# Patient Record
Sex: FEMALE | Race: WHITE | NOT HISPANIC OR LATINO | Employment: UNEMPLOYED | URBAN - METROPOLITAN AREA
[De-identification: names, ages, dates, MRNs, and addresses within clinical notes are randomized per-mention and may not be internally consistent; named-entity substitution may affect disease eponyms.]

---

## 2017-01-02 ENCOUNTER — HOSPITAL ENCOUNTER (INPATIENT)
Facility: HOSPITAL | Age: 72
LOS: 3 days | Discharge: HOME | DRG: 189 | End: 2017-01-06
Admitting: INTERNAL MEDICINE
Payer: MEDICARE

## 2017-01-02 DIAGNOSIS — I48.91 ATRIAL FIBRILLATION WITH RVR: Primary | ICD-10-CM

## 2017-01-02 DIAGNOSIS — I48.0 PAROXYSMAL ATRIAL FIBRILLATION: ICD-10-CM

## 2017-01-02 DIAGNOSIS — J45.909 ASTHMA: ICD-10-CM

## 2017-01-02 DIAGNOSIS — I10 ESSENTIAL HYPERTENSION: ICD-10-CM

## 2017-01-02 DIAGNOSIS — R06.02 SOB (SHORTNESS OF BREATH): ICD-10-CM

## 2017-01-02 DIAGNOSIS — E78.5 HYPERLIPIDEMIA, UNSPECIFIED HYPERLIPIDEMIA TYPE: ICD-10-CM

## 2017-01-02 DIAGNOSIS — R07.9 CHEST PAIN, UNSPECIFIED TYPE: ICD-10-CM

## 2017-01-02 DIAGNOSIS — K55.1 CHRONIC MESENTERIC ISCHEMIA: ICD-10-CM

## 2017-01-02 DIAGNOSIS — J44.1 COPD EXACERBATION: ICD-10-CM

## 2017-01-02 LAB
ALBUMIN SERPL BCP-MCNC: 3.6 G/DL
ALLENS TEST: ABNORMAL
ALP SERPL-CCNC: 43 U/L
ALT SERPL W/O P-5'-P-CCNC: 17 U/L
ANION GAP SERPL CALC-SCNC: 9 MMOL/L
AST SERPL-CCNC: 20 U/L
BASOPHILS # BLD AUTO: 0.02 K/UL
BASOPHILS NFR BLD: 0.2 %
BILIRUB SERPL-MCNC: 0.4 MG/DL
BILIRUB UR QL STRIP: NEGATIVE
BNP SERPL-MCNC: 245 PG/ML
BUN SERPL-MCNC: 12 MG/DL
CALCIUM SERPL-MCNC: 9.1 MG/DL
CHLORIDE SERPL-SCNC: 103 MMOL/L
CLARITY UR: CLEAR
CO2 SERPL-SCNC: 26 MMOL/L
COLOR UR: YELLOW
CREAT SERPL-MCNC: 0.7 MG/DL
DELSYS: ABNORMAL
DIFFERENTIAL METHOD: ABNORMAL
EOSINOPHIL # BLD AUTO: 0 K/UL
EOSINOPHIL NFR BLD: 0.1 %
ERYTHROCYTE [DISTWIDTH] IN BLOOD BY AUTOMATED COUNT: 14 %
EST. GFR  (AFRICAN AMERICAN): >60 ML/MIN/1.73 M^2
EST. GFR  (NON AFRICAN AMERICAN): >60 ML/MIN/1.73 M^2
FLUAV AG SPEC QL IA: NEGATIVE
FLUBV AG SPEC QL IA: NEGATIVE
GLUCOSE SERPL-MCNC: 99 MG/DL
GLUCOSE UR QL STRIP: NEGATIVE
HCO3 UR-SCNC: 22.3 MMOL/L (ref 24–28)
HCT VFR BLD AUTO: 39.4 %
HCT VFR BLD CALC: 40 %PCV (ref 36–54)
HGB BLD-MCNC: 13 G/DL
HGB BLD-MCNC: 14 G/DL
HGB UR QL STRIP: NEGATIVE
INR PPP: 1.9
KETONES UR QL STRIP: NEGATIVE
LACTATE SERPL-SCNC: 0.9 MMOL/L
LEUKOCYTE ESTERASE UR QL STRIP: NEGATIVE
LYMPHOCYTES # BLD AUTO: 2 K/UL
LYMPHOCYTES NFR BLD: 22 %
MAGNESIUM SERPL-MCNC: 1.4 MG/DL
MCH RBC QN AUTO: 31.6 PG
MCHC RBC AUTO-ENTMCNC: 33 %
MCV RBC AUTO: 96 FL
MONOCYTES # BLD AUTO: 1 K/UL
MONOCYTES NFR BLD: 11.1 %
NEUTROPHILS # BLD AUTO: 6 K/UL
NEUTROPHILS NFR BLD: 66.4 %
NITRITE UR QL STRIP: NEGATIVE
PCO2 BLDA: 38.1 MMHG (ref 35–45)
PH SMN: 7.38 [PH] (ref 7.35–7.45)
PH UR STRIP: 7 [PH] (ref 5–8)
PHOSPHATE SERPL-MCNC: 3.1 MG/DL
PLATELET # BLD AUTO: 228 K/UL
PMV BLD AUTO: 9.9 FL
PO2 BLDA: 99 MMHG (ref 80–100)
POC BE: -3 MMOL/L
POC IONIZED CALCIUM: 1.1 MMOL/L (ref 1.06–1.42)
POC SATURATED O2: 98 % (ref 95–100)
POC TCO2: 23 MMOL/L (ref 23–27)
POCT GLUCOSE: 195 MG/DL (ref 70–110)
POCT GLUCOSE: 240 MG/DL (ref 70–110)
POTASSIUM BLD-SCNC: 3.8 MMOL/L (ref 3.5–5.1)
POTASSIUM SERPL-SCNC: 3.9 MMOL/L
PROCALCITONIN SERPL IA-MCNC: <0.09 NG/ML
PROT SERPL-MCNC: 6.7 G/DL
PROT UR QL STRIP: ABNORMAL
PROTHROMBIN TIME: 19.7 SEC
RBC # BLD AUTO: 4.11 M/UL
SAMPLE: ABNORMAL
SITE: ABNORMAL
SODIUM BLD-SCNC: 137 MMOL/L (ref 136–145)
SODIUM SERPL-SCNC: 138 MMOL/L
SP GR UR STRIP: <=1.005 (ref 1–1.03)
SPECIMEN SOURCE: NORMAL
TROPONIN I SERPL DL<=0.01 NG/ML-MCNC: 0.01 NG/ML
URN SPEC COLLECT METH UR: ABNORMAL
UROBILINOGEN UR STRIP-ACNC: NEGATIVE EU/DL
WBC # BLD AUTO: 9 K/UL

## 2017-01-02 PROCEDURE — 96372 THER/PROPH/DIAG INJ SC/IM: CPT

## 2017-01-02 PROCEDURE — 25000003 PHARM REV CODE 250: Performed by: HOSPITALIST

## 2017-01-02 PROCEDURE — 80053 COMPREHEN METABOLIC PANEL: CPT

## 2017-01-02 PROCEDURE — 83880 ASSAY OF NATRIURETIC PEPTIDE: CPT

## 2017-01-02 PROCEDURE — 94644 CONT INHLJ TX 1ST HOUR: CPT

## 2017-01-02 PROCEDURE — 81003 URINALYSIS AUTO W/O SCOPE: CPT

## 2017-01-02 PROCEDURE — 83735 ASSAY OF MAGNESIUM: CPT

## 2017-01-02 PROCEDURE — 99285 EMERGENCY DEPT VISIT HI MDM: CPT

## 2017-01-02 PROCEDURE — 96368 THER/DIAG CONCURRENT INF: CPT

## 2017-01-02 PROCEDURE — 82962 GLUCOSE BLOOD TEST: CPT

## 2017-01-02 PROCEDURE — 87086 URINE CULTURE/COLONY COUNT: CPT

## 2017-01-02 PROCEDURE — 85610 PROTHROMBIN TIME: CPT

## 2017-01-02 PROCEDURE — 93005 ELECTROCARDIOGRAM TRACING: CPT

## 2017-01-02 PROCEDURE — 85025 COMPLETE CBC W/AUTO DIFF WBC: CPT

## 2017-01-02 PROCEDURE — 84484 ASSAY OF TROPONIN QUANT: CPT

## 2017-01-02 PROCEDURE — 25000003 PHARM REV CODE 250

## 2017-01-02 PROCEDURE — 63600175 PHARM REV CODE 636 W HCPCS

## 2017-01-02 PROCEDURE — 96375 TX/PRO/DX INJ NEW DRUG ADDON: CPT

## 2017-01-02 PROCEDURE — 96376 TX/PRO/DX INJ SAME DRUG ADON: CPT

## 2017-01-02 PROCEDURE — 84145 PROCALCITONIN (PCT): CPT

## 2017-01-02 PROCEDURE — 84100 ASSAY OF PHOSPHORUS: CPT

## 2017-01-02 PROCEDURE — 83605 ASSAY OF LACTIC ACID: CPT

## 2017-01-02 PROCEDURE — 94761 N-INVAS EAR/PLS OXIMETRY MLT: CPT

## 2017-01-02 PROCEDURE — G0378 HOSPITAL OBSERVATION PER HR: HCPCS

## 2017-01-02 PROCEDURE — 63600175 PHARM REV CODE 636 W HCPCS: Performed by: HOSPITALIST

## 2017-01-02 PROCEDURE — 36600 WITHDRAWAL OF ARTERIAL BLOOD: CPT

## 2017-01-02 PROCEDURE — 25000242 PHARM REV CODE 250 ALT 637 W/ HCPCS

## 2017-01-02 PROCEDURE — 82803 BLOOD GASES ANY COMBINATION: CPT

## 2017-01-02 PROCEDURE — 27000221 HC OXYGEN, UP TO 24 HOURS

## 2017-01-02 PROCEDURE — 96366 THER/PROPH/DIAG IV INF ADDON: CPT

## 2017-01-02 PROCEDURE — 96365 THER/PROPH/DIAG IV INF INIT: CPT

## 2017-01-02 PROCEDURE — 94640 AIRWAY INHALATION TREATMENT: CPT

## 2017-01-02 PROCEDURE — 87040 BLOOD CULTURE FOR BACTERIA: CPT | Mod: 59

## 2017-01-02 PROCEDURE — 87400 INFLUENZA A/B EACH AG IA: CPT

## 2017-01-02 PROCEDURE — 25000242 PHARM REV CODE 250 ALT 637 W/ HCPCS: Performed by: HOSPITALIST

## 2017-01-02 PROCEDURE — 96367 TX/PROPH/DG ADDL SEQ IV INF: CPT

## 2017-01-02 RX ORDER — IPRATROPIUM BROMIDE AND ALBUTEROL SULFATE 2.5; .5 MG/3ML; MG/3ML
3 SOLUTION RESPIRATORY (INHALATION) EVERY 4 HOURS PRN
Status: DISCONTINUED | OUTPATIENT
Start: 2017-01-02 | End: 2017-01-06 | Stop reason: HOSPADM

## 2017-01-02 RX ORDER — ALBUTEROL SULFATE 2.5 MG/.5ML
2.5 SOLUTION RESPIRATORY (INHALATION) ONCE
Status: COMPLETED | OUTPATIENT
Start: 2017-01-02 | End: 2017-01-02

## 2017-01-02 RX ORDER — HYDROCODONE BITARTRATE AND ACETAMINOPHEN 5; 325 MG/1; MG/1
1 TABLET ORAL
Status: COMPLETED | OUTPATIENT
Start: 2017-01-02 | End: 2017-01-02

## 2017-01-02 RX ORDER — ALBUTEROL SULFATE 2.5 MG/.5ML
SOLUTION RESPIRATORY (INHALATION)
Status: DISPENSED
Start: 2017-01-02 | End: 2017-01-03

## 2017-01-02 RX ORDER — DIPHENHYDRAMINE HYDROCHLORIDE 50 MG/ML
50 INJECTION INTRAMUSCULAR; INTRAVENOUS
Status: COMPLETED | OUTPATIENT
Start: 2017-01-02 | End: 2017-01-02

## 2017-01-02 RX ORDER — GLUCAGON 1 MG
1 KIT INJECTION
Status: DISCONTINUED | OUTPATIENT
Start: 2017-01-02 | End: 2017-01-06 | Stop reason: HOSPADM

## 2017-01-02 RX ORDER — IBUPROFEN 200 MG
24 TABLET ORAL
Status: DISCONTINUED | OUTPATIENT
Start: 2017-01-02 | End: 2017-01-06 | Stop reason: HOSPADM

## 2017-01-02 RX ORDER — CLOPIDOGREL BISULFATE 75 MG/1
75 TABLET ORAL DAILY
Status: DISCONTINUED | OUTPATIENT
Start: 2017-01-03 | End: 2017-01-06 | Stop reason: HOSPADM

## 2017-01-02 RX ORDER — IPRATROPIUM BROMIDE AND ALBUTEROL SULFATE 2.5; .5 MG/3ML; MG/3ML
3 SOLUTION RESPIRATORY (INHALATION)
Status: DISCONTINUED | OUTPATIENT
Start: 2017-01-02 | End: 2017-01-06 | Stop reason: HOSPADM

## 2017-01-02 RX ORDER — MEROPENEM AND SODIUM CHLORIDE 1 G/50ML
1 INJECTION, SOLUTION INTRAVENOUS
Status: DISCONTINUED | OUTPATIENT
Start: 2017-01-02 | End: 2017-01-02

## 2017-01-02 RX ORDER — OXYBUTYNIN CHLORIDE 5 MG/1
5 TABLET, EXTENDED RELEASE ORAL DAILY
Status: DISCONTINUED | OUTPATIENT
Start: 2017-01-03 | End: 2017-01-06 | Stop reason: HOSPADM

## 2017-01-02 RX ORDER — FLUTICASONE PROPIONATE 50 MCG
1 SPRAY, SUSPENSION (ML) NASAL DAILY
Status: DISCONTINUED | OUTPATIENT
Start: 2017-01-03 | End: 2017-01-06 | Stop reason: HOSPADM

## 2017-01-02 RX ORDER — ALBUTEROL SULFATE 2.5 MG/.5ML
10 SOLUTION RESPIRATORY (INHALATION) CONTINUOUS
Status: DISCONTINUED | OUTPATIENT
Start: 2017-01-02 | End: 2017-01-02

## 2017-01-02 RX ORDER — PANTOPRAZOLE SODIUM 40 MG/1
40 TABLET, DELAYED RELEASE ORAL DAILY
Status: DISCONTINUED | OUTPATIENT
Start: 2017-01-03 | End: 2017-01-06 | Stop reason: HOSPADM

## 2017-01-02 RX ORDER — ONDANSETRON 2 MG/ML
8 INJECTION INTRAMUSCULAR; INTRAVENOUS
Status: COMPLETED | OUTPATIENT
Start: 2017-01-02 | End: 2017-01-02

## 2017-01-02 RX ORDER — MONTELUKAST SODIUM 10 MG/1
10 TABLET ORAL NIGHTLY
Status: DISCONTINUED | OUTPATIENT
Start: 2017-01-02 | End: 2017-01-06 | Stop reason: HOSPADM

## 2017-01-02 RX ORDER — LISINOPRIL 20 MG/1
20 TABLET ORAL DAILY
Status: DISCONTINUED | OUTPATIENT
Start: 2017-01-03 | End: 2017-01-06 | Stop reason: HOSPADM

## 2017-01-02 RX ORDER — INSULIN ASPART 100 [IU]/ML
0-5 INJECTION, SOLUTION INTRAVENOUS; SUBCUTANEOUS
Status: DISCONTINUED | OUTPATIENT
Start: 2017-01-02 | End: 2017-01-06 | Stop reason: HOSPADM

## 2017-01-02 RX ORDER — DILTIAZEM HYDROCHLORIDE 30 MG/1
30 TABLET, FILM COATED ORAL
Status: COMPLETED | OUTPATIENT
Start: 2017-01-02 | End: 2017-01-02

## 2017-01-02 RX ORDER — PNV NO.95/FERROUS FUM/FOLIC AC 28MG-0.8MG
100 TABLET ORAL DAILY
Status: DISCONTINUED | OUTPATIENT
Start: 2017-01-03 | End: 2017-01-06 | Stop reason: HOSPADM

## 2017-01-02 RX ORDER — IBUPROFEN 200 MG
16 TABLET ORAL
Status: DISCONTINUED | OUTPATIENT
Start: 2017-01-02 | End: 2017-01-06 | Stop reason: HOSPADM

## 2017-01-02 RX ORDER — DILTIAZEM HYDROCHLORIDE 180 MG/1
180 CAPSULE, COATED, EXTENDED RELEASE ORAL DAILY
Status: DISCONTINUED | OUTPATIENT
Start: 2017-01-03 | End: 2017-01-03

## 2017-01-02 RX ORDER — METHYLPREDNISOLONE SOD SUCC 125 MG
125 VIAL (EA) INJECTION
Status: COMPLETED | OUTPATIENT
Start: 2017-01-02 | End: 2017-01-02

## 2017-01-02 RX ORDER — MAGNESIUM SULFATE HEPTAHYDRATE 40 MG/ML
2 INJECTION, SOLUTION INTRAVENOUS ONCE
Status: COMPLETED | OUTPATIENT
Start: 2017-01-02 | End: 2017-01-02

## 2017-01-02 RX ORDER — DILTIAZEM HYDROCHLORIDE 5 MG/ML
20 INJECTION INTRAVENOUS
Status: COMPLETED | OUTPATIENT
Start: 2017-01-02 | End: 2017-01-02

## 2017-01-02 RX ORDER — AZELASTINE 1 MG/ML
1 SPRAY, METERED NASAL 2 TIMES DAILY
Status: DISCONTINUED | OUTPATIENT
Start: 2017-01-02 | End: 2017-01-06 | Stop reason: HOSPADM

## 2017-01-02 RX ORDER — FLUTICASONE FUROATE AND VILANTEROL 100; 25 UG/1; UG/1
1 POWDER RESPIRATORY (INHALATION) DAILY
Status: DISCONTINUED | OUTPATIENT
Start: 2017-01-03 | End: 2017-01-06 | Stop reason: HOSPADM

## 2017-01-02 RX ORDER — DILTIAZEM HYDROCHLORIDE 5 MG/ML
15 INJECTION INTRAVENOUS
Status: COMPLETED | OUTPATIENT
Start: 2017-01-02 | End: 2017-01-02

## 2017-01-02 RX ORDER — FERROUS SULFATE 325(65) MG
325 TABLET, DELAYED RELEASE (ENTERIC COATED) ORAL
Status: DISCONTINUED | OUTPATIENT
Start: 2017-01-03 | End: 2017-01-06 | Stop reason: HOSPADM

## 2017-01-02 RX ORDER — ONDANSETRON 8 MG/1
8 TABLET, ORALLY DISINTEGRATING ORAL EVERY 8 HOURS PRN
Status: DISCONTINUED | OUTPATIENT
Start: 2017-01-02 | End: 2017-01-06 | Stop reason: HOSPADM

## 2017-01-02 RX ORDER — WARFARIN SODIUM 5 MG/1
5 TABLET ORAL
Status: DISCONTINUED | OUTPATIENT
Start: 2017-01-03 | End: 2017-01-05

## 2017-01-02 RX ORDER — MEROPENEM AND SODIUM CHLORIDE 1 G/50ML
1 INJECTION, SOLUTION INTRAVENOUS
Status: COMPLETED | OUTPATIENT
Start: 2017-01-02 | End: 2017-01-02

## 2017-01-02 RX ORDER — IPRATROPIUM BROMIDE 0.5 MG/2.5ML
SOLUTION RESPIRATORY (INHALATION)
Status: COMPLETED
Start: 2017-01-02 | End: 2017-01-02

## 2017-01-02 RX ORDER — SODIUM CHLORIDE 9 MG/ML
INJECTION, SOLUTION INTRAVENOUS CONTINUOUS
Status: ACTIVE | OUTPATIENT
Start: 2017-01-02 | End: 2017-01-03

## 2017-01-02 RX ORDER — IPRATROPIUM BROMIDE 0.5 MG/2.5ML
0.5 SOLUTION RESPIRATORY (INHALATION) ONCE
Status: COMPLETED | OUTPATIENT
Start: 2017-01-02 | End: 2017-01-02

## 2017-01-02 RX ADMIN — ALBUTEROL SULFATE 2.5 MG: 2.5 SOLUTION RESPIRATORY (INHALATION) at 05:01

## 2017-01-02 RX ADMIN — METHYLPREDNISOLONE SODIUM SUCCINATE 125 MG: 125 INJECTION, POWDER, FOR SOLUTION INTRAMUSCULAR; INTRAVENOUS at 01:01

## 2017-01-02 RX ADMIN — MONTELUKAST SODIUM 10 MG: 10 TABLET, FILM COATED ORAL at 10:01

## 2017-01-02 RX ADMIN — SODIUM CHLORIDE: 0.9 INJECTION, SOLUTION INTRAVENOUS at 07:01

## 2017-01-02 RX ADMIN — HYDROCODONE BITARTRATE AND ACETAMINOPHEN 1 TABLET: 5; 325 TABLET ORAL at 01:01

## 2017-01-02 RX ADMIN — ONDANSETRON 8 MG: 2 INJECTION INTRAMUSCULAR; INTRAVENOUS at 02:01

## 2017-01-02 RX ADMIN — MEROPENEM AND SODIUM CHLORIDE 1 G: 1 INJECTION, SOLUTION INTRAVENOUS at 01:01

## 2017-01-02 RX ADMIN — VANCOMYCIN HYDROCHLORIDE 2000 MG: 1 INJECTION, POWDER, LYOPHILIZED, FOR SOLUTION INTRAVENOUS at 02:01

## 2017-01-02 RX ADMIN — AZELASTINE HYDROCHLORIDE 137 MCG: 137 SPRAY, METERED NASAL at 11:01

## 2017-01-02 RX ADMIN — INSULIN ASPART 1 UNITS: 100 INJECTION, SOLUTION INTRAVENOUS; SUBCUTANEOUS at 08:01

## 2017-01-02 RX ADMIN — DILTIAZEM HYDROCHLORIDE 20 MG: 5 INJECTION INTRAVENOUS at 02:01

## 2017-01-02 RX ADMIN — DILTIAZEM HYDROCHLORIDE 30 MG: 30 TABLET, FILM COATED ORAL at 03:01

## 2017-01-02 RX ADMIN — IPRATROPIUM BROMIDE AND ALBUTEROL SULFATE 3 ML: .5; 3 SOLUTION RESPIRATORY (INHALATION) at 07:01

## 2017-01-02 RX ADMIN — MAGNESIUM SULFATE IN WATER 2 G: 40 INJECTION, SOLUTION INTRAVENOUS at 07:01

## 2017-01-02 RX ADMIN — IPRATROPIUM BROMIDE 0.5 MG: 0.5 SOLUTION RESPIRATORY (INHALATION) at 05:01

## 2017-01-02 RX ADMIN — DIPHENHYDRAMINE HYDROCHLORIDE 50 MG: 50 INJECTION, SOLUTION INTRAMUSCULAR; INTRAVENOUS at 02:01

## 2017-01-02 RX ADMIN — SODIUM CHLORIDE 1000 ML: 0.9 INJECTION, SOLUTION INTRAVENOUS at 01:01

## 2017-01-02 RX ADMIN — ALBUTEROL SULFATE 10 MG: 2.5 SOLUTION RESPIRATORY (INHALATION) at 01:01

## 2017-01-02 RX ADMIN — DILTIAZEM HYDROCHLORIDE 15 MG: 5 INJECTION INTRAVENOUS at 01:01

## 2017-01-02 RX ADMIN — Medication 1 G: at 11:01

## 2017-01-02 NOTE — H&P
[facility] History and Physical - Resident Note    Admitting Team: [facility] Team A  Attending Physician: Dr. Calvillo  Resident: Sirena Keith and Teri Greene    Date of Admit: 1/2/2017    Chief Complaint     Shortness of Breath (was sent to ER by [facility].  Was diagnosed with pneumonia on Friday.  Patient complaints od increase shortness of breath and tachycardic.)   for 1.5 weeks    Subjective:      History of Present Illness:  Adriana Strong is a 72 y.o.  female who  has a past medical history of Anticoagulant long-term use; Anxiety; Arthritis; Asthma; Cataracts, bilateral; Chest pain, atypical; Colon polyp; Coronary artery disease; Diabetes mellitus; Dry eyes; Esophageal erosions; GERD (gastroesophageal reflux disease); Heart murmur; Hemorrhoid; High cholesterol; Hypertension; Irritable bowel syndrome; Shingles (2015); TIA (transient ischemic attack); and Use of cane as ambulatory aid.. The patient presented to [facility] on 1/2/2017 with a primary complaint of Shortness of Breath (was sent to ER by [facility].  Was diagnosed with pneumonia on Friday.  Patient complaints od increase shortness of breath and tachycardic.)      The patient was in their usual state of health until 1.5 weeks ago when she began to have post-nasal drip and congestion with a non-productive cough. 3 days ago she went to her PCP Dr. Mcgraw's office and was given azithromycin for pneumonia seen on CXR. Reports chills but no fever. Has been taking the azithromycin without improvement. States she went to urgent care today because she was coughing so much she could not catch her breath this AM. Was sent from urgent care to ED. Found to be in a-fib with RVR. Reports her  recently had a sinus infection. Has not been eating or drinking much. States she took her medications this AM.    ED Interventions: 1 L NS, 1 g meropenem IV, 2 g Vanc IV, Percocet 5 mg, Diltiazem 15 mg IV,  Diltiazem 20 mg IV, Diltiazem 30 mg PO, continuous albuterol neb, benadryl 50 mg    Past Medical History:  Past Medical History   Diagnosis Date    Anticoagulant long-term use      on Plavix since May 2015    Anxiety     Arthritis     Asthma     Cataracts, bilateral     Chest pain, atypical     Colon polyp     Coronary artery disease     Diabetes mellitus     Dry eyes     Esophageal erosions     GERD (gastroesophageal reflux disease)     Heart murmur     Hemorrhoid     High cholesterol     Hypertension     Irritable bowel syndrome     Shingles 2015    TIA (transient ischemic attack)     Use of cane as ambulatory aid        Past Surgical History:  Past Surgical History   Procedure Laterality Date    Total knee arthroplasty Bilateral     Nose polyp      Breast surgery       left--- a lump--- no cancer    Tonsillectomy      Left nasal polyp      Hysterectomy  partial     1982 partial hysterectomy    Total abdominal hysterectomy       2014    Left neck lymph node      Right hip fatty mass tissue      Upper gastrointestinal endoscopy  2014    Colonoscopy  2014    Abdominal surgery      Stent to small intestine      Angiocele       2007, 2014    Superior vena cava angioplasty / stenting         Allergies:  Review of patient's allergies indicates:   Allergen Reactions    Celebrex [celecoxib] Shortness Of Breath    Dicyclomine Hives    Adhesive Dermatitis    Avelox [moxifloxacin] Swelling    Cilostazol Other (See Comments)     Elevates blood pressure    Eryc [erythromycin] Other (See Comments)     unknown    Iodine and iodide containing products Hives    Keflex [cephalexin] Hives    Meclomen      rashes    Meloxicam      Ear ringing    Metoclopramide Other (See Comments)     High blood pressure    Sulfa (sulfonamide antibiotics) Itching       Home Medications:  Prior to Admission medications    Medication Sig Start Date End Date Taking? Authorizing Provider   acetaminophen  (TYLENOL ARTHRITIS) 650 MG TbSR Take 1,300 mg by mouth 2 (two) times daily.    Historical Provider, MD   ADVAIR DISKUS 250-50 mcg/dose diskus inhaler Inhale 1 puff into the lungs 2 (two) times daily.  2/12/15   Historical Provider, MD   albuterol (ACCUNEB) 1.25 mg/3 mL Nebu Take 3 mLs (1.25 mg total) by nebulization every 6 (six) hours as needed (x 4 days while awake). 7/2/16 7/2/17  George Yates MD   albuterol sulfate 2.5 mg/0.5 mL Nebu Take 2.5 mg by nebulization.    Historical Provider, MD   ascorbic acid (VITAMIN C) 500 MG tablet Take 500 mg by mouth once daily.    Historical Provider, MD   azelastine (ASTELIN) 137 mcg nasal spray 1 spray by Nasal route 2 (two) times daily.    Historical Provider, MD   calcium carbonate (OS-MICHAEL) 600 mg (1,500 mg) Tab Take 1,200 mg by mouth once daily.    Historical Provider, MD   clopidogrel (PLAVIX) 75 mg tablet Take 1 tablet (75 mg total) by mouth once daily. 12/17/15   Joana Hernandez MD   cyanocobalamin (VITAMIN B-12) 1000 MCG tablet Take 100 mcg by mouth once daily.    Historical Provider, MD   diltiaZEM (DILACOR XR) 180 MG CDCR Take 1 capsule (180 mg total) by mouth once daily. 11/11/16 11/11/17  Jr Carrington MD   ferrous sulfate 325 mg (65 mg iron) Tab tablet Take 325 mg by mouth daily with breakfast.    Historical Provider, MD   fish oil-omega-3 fatty acids 300-1,000 mg capsule Take 1 g by mouth once daily.     Historical Provider, MD   fluticasone (FLONASE) 50 mcg/actuation nasal spray 1 spray by Each Nare route once daily.    Historical Provider, MD   FLUZONE HIGH-DOSE 2016-17, PF, 180 mcg/0.5 mL Syrg TO BE ADMINISTERED BY PHARMACIST FOR IMMUNIZATION 8/23/16   Historical Provider, MD   lisinopril (PRINIVIL,ZESTRIL) 20 MG tablet Take 1 tablet (20 mg total) by mouth once daily. 12/9/16 12/9/17  Jr Carrington MD   lorazepam (ATIVAN) 0.5 MG tablet Take 0.5 mg by mouth once daily.    Historical Provider, MD   magnesium 250 mg Tab Take 750 mg by  "mouth as needed.     Historical Provider, MD   montelukast (SINGULAIR) 10 mg tablet Take 10 mg by mouth every evening.    Historical Provider, MD   oxybutynin (DITROPAN-XL) 5 MG TR24  10/19/15   Historical Provider, MD   pantoprazole (PROTONIX) 40 MG tablet Take 1 tablet (40 mg total) by mouth once daily. 12/2/15   Efren Kemp MD   polyethylene glycol (GLYCOLAX) 17 gram PwPk Take 17 g by mouth once daily.    Historical Provider, MD   POLYSORBATE 80/GLYCERIN (REFRESH DRY EYE THERAPY OPHT) Apply to eye 2 (two) times daily.    Historical Provider, MD   predniSONE (DELTASONE) 20 MG tablet TAKE 2 TABLETS BY MOUTH ON DAYS 1-3 THEN TAKE 1 TABLET ON DAYS 4-5 THEN 1/2 TAB ON DAYS 6-7 16   Historical Provider, MD   PROAIR HFA 90 mcg/actuation inhaler  11/10/15   Historical Provider, MD   psyllium (METAMUCIL) powder Take 1 packet by mouth 3 (three) times daily.    Historical Provider, MD   warfarin (COUMADIN) 5 MG tablet Take 1 1/2 tablet  and 1 tablet  Sat Sun 16   Jr Carrington MD       Family History:  Family History   Problem Relation Age of Onset    Colon cancer Neg Hx     Inflammatory bowel disease Neg Hx        Social History:  Social History   Substance Use Topics    Smoking status: Never Smoker    Smokeless tobacco: None    Alcohol use No       Review of Systems:  Pertinent items are noted in HPI. All other systems are reviewed and are negative.    Health Maintaince :   Primary Care Physician: Mariluz  Immunizations:   TDap is up to date.  Influenza is up to date.  Pneumovax is up to date.     Objective:   Last 24 Hour Vital Signs:  BP  Min: 120/61  Max: 146/72  Temp  Av.3 °F (37.4 °C)  Min: 98.2 °F (36.8 °C)  Max: 99.8 °F (37.7 °C)  Pulse  Av.1  Min: 60  Max: 131  Resp  Av.4  Min: 17  Max: 26  SpO2  Av.1 %  Min: 94 %  Max: 100 %  Height  Av' 4" (162.6 cm)  Min: 5' 4" (162.6 cm)  Max: 5' 4" (162.6 cm)  Weight  Av.7 kg (222 lb)  Min: 100.7 kg (222 " lb)  Max: 100.7 kg (222 lb)  Body mass index is 38.11 kg/(m^2).       Physical Examination:  Gen: awake, alert, on nasal canula  Head: NCAT   Eyes: PERRL EOMI  CV: Tachycardic, irregularly irregular, no murmur appreciated  Pulm: Tachypneic, Diffuse wheezes and rhonchi.  ABD: Soft, nontender, nondistended  Ext: no c/c/e  Neuro: non-focal    Laboratory:  Most Recent Data:  CBC: Lab Results   Component Value Date    WBC 9.00 01/02/2017    HGB 13.0 01/02/2017    HCT 39.4 01/02/2017     01/02/2017    MCV 96 01/02/2017    RDW 14.0 01/02/2017     WBC Differential: 66.4 % N, 0 % Bands, 22 % L, 11.1 % M, 0.1 % Eo, 0.2 % Baso, 0 additional cells seen  BMP: Lab Results   Component Value Date     01/02/2017    K 3.9 01/02/2017     01/02/2017    CO2 26 01/02/2017    BUN 12 01/02/2017    CREATININE 0.7 01/02/2017    GLU 99 01/02/2017    CALCIUM 9.1 01/02/2017    MG 1.4 (L) 01/02/2017    PHOS 3.1 01/02/2017     LFTs: Lab Results   Component Value Date    PROT 6.7 01/02/2017    ALBUMIN 3.6 01/02/2017    BILITOT 0.4 01/02/2017    AST 20 01/02/2017    ALKPHOS 43 (L) 01/02/2017    ALT 17 01/02/2017     Coags:   Lab Results   Component Value Date    INR 1.9 (H) 01/02/2017     FLP: Lab Results   Component Value Date    CHOL 220 (H) 09/28/2016    HDL 75 09/28/2016    LDLCALC 124 09/28/2016    TRIG 104 09/28/2016    CHOLHDL 2.9 09/28/2016     DM: Lab Results   Component Value Date    HGBA1C 5.9 01/21/2016    HGBA1C 5.7 11/08/2015    HGBA1C 5.9 05/08/2015    LDLCALC 124 09/28/2016    CREATININE 0.7 01/02/2017     Thyroid: Lab Results   Component Value Date    TSH 1.62 09/28/2016    FREET4 1.19 07/08/2016     Anemia: Lab Results   Component Value Date    IRON 42 (L) 08/24/2016    TIBC 462 (H) 08/24/2016    FERRITIN 16 (L) 07/01/2016    ZUADYKIC05 258 08/24/2016    FOLATE >24.0 08/24/2016     Cardiac: Lab Results   Component Value Date    TROPONINI 0.013 01/02/2017     (H) 01/02/2017     Urinalysis: Lab Results    Component Value Date    LABURIN  05/07/2011     60,000 ORGANISMS/ML   ESCHERICHIA COLI  Amikacin             <=16       SENSITIVE  Amox/K Clav          16/8       INTERMEDIATE  Ceftriaxone          <=8        SENSITIVE  Ceftazidime          <=1        SENSITIVE  Cefazolin            16         INTERMEDIATE  Ciprofloxacin        <=1        SENSITIVE  Nitrofurantoin       <=32       SENSITIVE  Gentamicin           <=4        SENSITIVE  Meropenem            <=4        SENSITIVE  Bactrim              <=2/38     SENSITIVE  Tetracycline         <=4        SENSITIVE  Timentin             >64        RESISTANT  Tobramycin           <=4        SENSITIVE    LABURIN NO OTHER SIGNIFICANT ISOLATE. 05/07/2011    COLORU Yellow 01/02/2017    SPECGRAV <=1.005 (A) 01/02/2017    NITRITE Negative 01/02/2017    KETONESU Negative 01/02/2017    UROBILINOGEN Negative 01/02/2017    WBCUA 3 01/20/2016       Trended Lab Data:    Recent Labs  Lab 01/02/17  1239   WBC 9.00   HGB 13.0   HCT 39.4      MCV 96   RDW 14.0      K 3.9      CO2 26   BUN 12   CREATININE 0.7   GLU 99   PROT 6.7   ALBUMIN 3.6   BILITOT 0.4   AST 20   ALKPHOS 43*   ALT 17       Trended Cardiac Data:    Recent Labs  Lab 01/02/17  1239   TROPONINI 0.013   *       Microbiology Data:  Blood Cx x 2: in process  Urine Cx: in process    Other Laboratory Data:      Other Results:  EKG (my interpretation):a fib with RVR, rate 112    Radiology:  Imaging Results         X-Ray Chest AP Portable (Final result) Result time:  01/02/17 14:10:12    Final result by Devonte Adair MD (01/02/17 14:10:12)    Impression:     No acute findings.       Electronically signed by: Devonte Adair MD  Date:     01/02/17  Time:    14:10     Narrative:    AP Portable Chest    Comparison: 7/8/16     Findings: Elevated right hemidiaphragm similar to the prior exam. Enlarged cardiac silhouette, also similar to the prior exam.   The lungs are grossly unremarkable, noting limited  evaluation with AP portable chest. Further evaluation could be performed with a PA and Lateral chest, if indicated.  No pleural effusions or pneumothorax identified.               Assessment and Plan:     Adriana Strong is a 72 y.o. female with:     Atrial fibrillation with RVR  -pt with paroxysmal a-fib, follows with Dr. Osorio  -KELVIN-Vas 5, on warfarin 5 mg T, TH, Sat, Sun, and 7.5 mg MWF, continue and follow daily INRs  -S/p diltiazem 15 mg IV, diltiazem 20 mg IV, and diltiazem 30 mg PO in ED, rate now better controlled  -Monitor on telemetry  -Will give home diltiazem 180 mg in AM.  -Patient with decreased fluid intake, s/p 1L NS in ED, will give 1 more liter at 125 cc/hr and reassess.    COPD Exacerbation vs CAP  -patient with diffuse wheezing and rhonchi on exam, dry cough  -Has been on azithromycin at home , will stop.  -Given Vanc/meropenen in ED, will continue  -Duo-nebs scheduled and PRN  -S/p solumedrol 125 mg in ED, will start daily prednisone 50 mg tomorrow  -Not on home 02.     Chronic Mesenteric Ischemia  -Diagnosed in 2015  -No pain at this time  -Cotninue Plavix  -Follows with vascular surgery at [facility]    HTN  -Continue lisinopril 20 mg daily    Pre-Diabetes  - HBA1c 5.9 in 1/2016    -Monitor glucose while on steroids    Diet: cardiac  Ppx: on warfarin  Dispo: monitor on tele overnight, pending clinical improvement    Code Status:     Full    Sirena Keith  [facility] Internal Medicine HO-II  [facility] Hospitalist Medicine Service    [facility] Medicine Hospitalist Pager numbers:   [facility] Hospitalist Medicine Team A (Rajinder/Isis): [phone]  [facility] Hospitalist Medicine Team B (Antoni/Carleen):  [phone]

## 2017-01-02 NOTE — IP AVS SNAPSHOT
[facility]  [address]  [place] [address]  Phone: [phone]           I have received a copy of my After Visit Summary and discharge instructions from [facility].    INSTRUCTIONS RECEIVED AND UNDERSTOOD BY:                     Patient/Patient Representative: ________________________________________________________________     Date/Time: ________________________________________________________________                     Instructions Given By: ________________________________________________________________     Date/Time: ________________________________________________________________

## 2017-01-02 NOTE — ED NOTES
Pt was diagnosed with pneumonia on Friday and was started on Azithromycin. Also states she had afib when she went to urgent care on Friday, so was scheduled to follow up with physician later this week. States SOB and cough have been worsening. Reports a mostly nonproductive cough. Denies fever. Breath sounds coarse throughout chest, with expiratory wheezes noted at bases. O2 sat. 95-98% on room air, but pt is tachypneic. Skin is warm, dry. Atrial fibrillation on monitor. VSS.

## 2017-01-02 NOTE — IP AVS SNAPSHOT
[facility]  [address]  [place] [address]  Phone: [phone]           Patient Discharge Instructions     Our goal is to set you up for success. This packet includes information on your condition, medications, and your home care. It will help you to care for yourself so you don't get sicker and need to go back to the hospital.     Please ask your nurse if you have any questions.        There are many details to remember when preparing to leave the hospital. Here is what you will need to do:    1. Take your medicine. If you are prescribed medications, review your Medication List in the following pages. You may have new medications to  at the pharmacy and others that you'll need to stop taking. Review the instructions for how and when to take your medications. Talk with your doctor or nurses if you are unsure of what to do.     2. Go to your follow-up appointments. Specific follow-up information is listed in the following pages. Your may be contacted by a transition nurse or clinical provider about future appointments. Be sure we have all of the phone numbers to reach you, if needed. Please contact your provider's office if you are unable to make an appointment.     3. Watch for warning signs. Your doctor or nurse will give you detailed warning signs to watch for and when to call for assistance. These instructions may also include educational information about your condition. If you experience any of warning signs to your health, call your doctor.               ** Verify the list of medication(s) below is accurate and up to date. Carry this with you in case of emergency. If your medications have changed, please notify your healthcare provider.             Medication List      START taking these medications        Additional Info                      diltiaZEM 60 MG Cp12   Commonly known as:  CARDIZEM SR   Quantity:  180 capsule   Refills:  11   Dose:  180 mg   Replaces:  diltiaZEM 180  MG Cdcr    Instructions:  Take 3 capsules (180 mg total) by mouth 2 (two) times daily.     Begin Date    AM    Noon    PM    Bedtime       doxycycline 100 MG tablet   Commonly known as:  VIBRA-TABS   Quantity:  5 tablet   Refills:  0   Dose:  100 mg   Indications:  Pneumonia    Last time this was given:  100 mg on 1/6/2017  8:42 AM   Instructions:  Take 1 tablet (100 mg total) by mouth every 12 (twelve) hours.     Begin Date    AM    Noon    PM    Bedtime       guaifenesin-codeine 100-10 mg/5 ml  mg/5 mL syrup   Commonly known as:  TUSSI-ORGANIDIN NR   Quantity:  180 mL   Refills:  0   Dose:  10 mL    Last time this was given:  10 mLs on 1/6/2017  5:53 AM   Instructions:  Take 10 mLs by mouth every 8 (eight) hours as needed for Cough or Congestion (cough).     Begin Date    AM    Noon    PM    Bedtime         CHANGE how you take these medications        Additional Info                      fluticasone-salmeterol 250-50 mcg/dose 250-50 mcg/dose diskus inhaler   Commonly known as:  ADVAIR DISKUS   Quantity:  14 each   Refills:  6   Dose:  2 puff   What changed:  how much to take    Instructions:  Inhale 2 puffs into the lungs 2 (two) times daily.     Begin Date    AM    Noon    PM    Bedtime       predniSONE 10 MG tablet   Commonly known as:  DELTASONE   Quantity:  1 tablet   Refills:  0   Dose:  50 mg   What changed:  See the new instructions.    Last time this was given:  50 mg on 1/6/2017  8:42 AM   Instructions:  Take 5 tablets (50 mg total) by mouth once daily.     Begin Date    AM    Noon    PM    Bedtime       * PROAIR HFA 90 mcg/actuation inhaler   Refills:  6   What changed:    - Another medication with the same name was changed. Make sure you understand how and when to take each.  - Another medication with the same name was removed. Continue taking this medication, and follow the directions you see here.   Generic drug:  albuterol      Begin Date    AM    Noon    PM    Bedtime       * albuterol 1.25  mg/3 mL Nebu   Commonly known as:  ACCUNEB   Quantity:  3 mL   Refills:  1   What changed:    - how much to take  - how to take this  - when to take this  - reasons to take this  - additional instructions  - Another medication with the same name was removed. Continue taking this medication, and follow the directions you see here.    Instructions:  Take scheduled q4 for the next two days while at home then resume prn dosing     Begin Date    AM    Noon    PM    Bedtime       warfarin 7.5 MG tablet   Commonly known as:  COUMADIN   Quantity:  30 tablet   Refills:  11   Dose:  7.5 mg   What changed:    - medication strength  - how much to take  - how to take this  - when to take this  - additional instructions    Last time this was given:  7.5 mg on 1/5/2017  5:18 PM   Instructions:  Take 1 tablet (7.5 mg total) by mouth Daily.     Begin Date    AM    Noon    PM    Bedtime       * Notice:  This list has 2 medication(s) that are the same as other medications prescribed for you. Read the directions carefully, and ask your doctor or other care provider to review them with you.      CONTINUE taking these medications        Additional Info                      azelastine 137 mcg (0.1 %) nasal spray   Commonly known as:  ASTELIN   Refills:  0   Dose:  1 spray    Last time this was given:  137 mcg on 1/6/2017  8:43 AM   Instructions:  1 spray by Nasal route 2 (two) times daily.     Begin Date    AM    Noon    PM    Bedtime       calcium carbonate 600 mg (1,500 mg) Tab   Commonly known as:  OS-MICHAEL   Refills:  0   Dose:  1200 mg    Instructions:  Take 1,200 mg by mouth once daily.     Begin Date    AM    Noon    PM    Bedtime       clopidogrel 75 mg tablet   Commonly known as:  PLAVIX   Quantity:  90 tablet   Refills:  4   Dose:  75 mg    Last time this was given:  75 mg on 1/6/2017  8:42 AM   Instructions:  Take 1 tablet (75 mg total) by mouth once daily.     Begin Date    AM    Noon    PM    Bedtime       ferrous sulfate 325  mg (65 mg iron) Tab tablet   Refills:  0   Dose:  325 mg    Instructions:  Take 325 mg by mouth daily with breakfast.     Begin Date    AM    Noon    PM    Bedtime       fish oil-omega-3 fatty acids 300-1,000 mg capsule   Refills:  0   Dose:  1 g    Instructions:  Take 1 g by mouth once daily.     Begin Date    AM    Noon    PM    Bedtime       fluticasone 50 mcg/actuation nasal spray   Commonly known as:  FLONASE   Refills:  0   Dose:  1 spray    Last time this was given:  1 spray on 1/6/2017  8:42 AM   Instructions:  1 spray by Each Nare route once daily.     Begin Date    AM    Noon    PM    Bedtime       FLUZONE HIGH-DOSE 2016-17 (PF) 180 mcg/0.5 mL Syrg   Refills:  0   Generic drug:  flu vacc [id number] 65yr up(PF)    Instructions:  TO BE ADMINISTERED BY PHARMACIST FOR IMMUNIZATION     Begin Date    AM    Noon    PM    Bedtime       lisinopril 20 MG tablet   Commonly known as:  PRINIVIL,ZESTRIL   Quantity:  90 tablet   Refills:  3   Dose:  20 mg    Last time this was given:  20 mg on 1/6/2017  8:42 AM   Instructions:  Take 1 tablet (20 mg total) by mouth once daily.     Begin Date    AM    Noon    PM    Bedtime       lorazepam 0.5 MG tablet   Commonly known as:  ATIVAN   Refills:  0   Dose:  0.5 mg    Instructions:  Take 0.5 mg by mouth once daily.     Begin Date    AM    Noon    PM    Bedtime       magnesium 250 mg Tab   Refills:  0   Dose:  750 mg    Instructions:  Take 750 mg by mouth as needed.     Begin Date    AM    Noon    PM    Bedtime       montelukast 10 mg tablet   Commonly known as:  SINGULAIR   Refills:  0   Dose:  10 mg    Last time this was given:  10 mg on 1/5/2017  9:08 PM   Instructions:  Take 10 mg by mouth every evening.     Begin Date    AM    Noon    PM    Bedtime       oxybutynin 5 MG Tr24   Commonly known as:  DITROPAN-XL   Refills:  0    Last time this was given:  5 mg on 1/6/2017  8:42 AM     Begin Date    AM    Noon    PM    Bedtime       pantoprazole 40 MG tablet   Commonly known as:   PROTONIX   Quantity:  90 tablet   Refills:  3   Dose:  40 mg    Last time this was given:  40 mg on 1/6/2017  8:42 AM   Instructions:  Take 1 tablet (40 mg total) by mouth once daily.     Begin Date    AM    Noon    PM    Bedtime       polyethylene glycol 17 gram Pwpk   Commonly known as:  GLYCOLAX   Refills:  0   Dose:  17 g    Last time this was given:  17 g on 1/4/2017  9:12 PM   Instructions:  Take 17 g by mouth once daily.     Begin Date    AM    Noon    PM    Bedtime       psyllium powder   Commonly known as:  METAMUCIL   Refills:  0   Dose:  1 packet    Instructions:  Take 1 packet by mouth 3 (three) times daily.     Begin Date    AM    Noon    PM    Bedtime       REFRESH DRY EYE THERAPY OPHT   Refills:  0    Instructions:  Apply to eye 2 (two) times daily.     Begin Date    AM    Noon    PM    Bedtime       TYLENOL ARTHRITIS 650 MG Tbsr   Refills:  0   Dose:  1300 mg   Generic drug:  acetaminophen    Instructions:  Take 1,300 mg by mouth 2 (two) times daily.     Begin Date    AM    Noon    PM    Bedtime       VITAMIN B-12 1000 MCG tablet   Refills:  0   Dose:  100 mcg   Generic drug:  cyanocobalamin    Last time this was given:  100 mcg on 1/6/2017  8:42 AM   Instructions:  Take 100 mcg by mouth once daily.     Begin Date    AM    Noon    PM    Bedtime       VITAMIN C 500 MG tablet   Refills:  0   Dose:  500 mg   Generic drug:  ascorbic acid (vitamin C)    Instructions:  Take 500 mg by mouth once daily.     Begin Date    AM    Noon    PM    Bedtime         STOP taking these medications     diltiaZEM 180 MG Cdcr   Commonly known as:  DILACOR XR   Replaced by:  diltiaZEM 60 MG Cp12            Where to Get Your Medications      You can get these medications from any pharmacy     Bring a paper prescription for each of these medications     albuterol 1.25 mg/3 mL Nebu    diltiaZEM 60 MG Cp12    doxycycline 100 MG tablet    fluticasone-salmeterol 250-50 mcg/dose 250-50 mcg/dose diskus inhaler     guaifenesin-codeine 100-10 mg/5 ml  mg/5 mL syrup    predniSONE 10 MG tablet    warfarin 7.5 MG tablet                  Please bring to all follow up appointments:    1. A copy of your discharge instructions.  2. All medicines you are currently taking in their original bottles.  3. Identification and insurance card.    Please arrive 15 minutes ahead of scheduled appointment time.    Please call 24 hours in advance if you must reschedule your appointment and/or time.        Your Scheduled Appointments     Jan 13, 2017 12:30 PM [facility]   Hospital Follow Up with Jr Carrington MD   [facility] [place] [facility] ([facility])    [address] [address] [phone]            Jan 19, 2017  8:30 AM CST   Mesenteric Arterial with VASCULAR, LAB   [facility] - Vascular Laboratory ([facility] )    [address] [place] [address] [phone]            Jan 19, 2017  9:05 AM CST   Established Patient Visit with Joana Hernandez MD   [facility] - Vascular Surgery ([facility] )    [address] [place] [address] [phone]              Follow-up Information     Follow up with Krzysztof Mcgraw MD On 1/10/2017.    Specialty:  Family Medicine    Why:  time 8:40    Contact information:    [address] [facility] [address]  [facility] [id number] [phone]          Follow up with [facility] On 1/13/2017.    Specialty:  Cardiology    Why:  time: 12:30 with INR check    Contact information:    [id number]  [facility] [address] [phone]        Follow up with Zelalem Grant MD On 1/16/2017.    Specialty:  Pulmonary Disease    Why:  time: 11:00    Contact information:    [address] [address] [phone]          Follow up with [facility] - Vascular Surgery On 1/19/2017.    Specialty:  Vascular Surgery    Why:  lab 8:30 then MD appt 9:05    Contact information:    [address] [facility]  [facility] [id number] [phone]    Additional information:    [facility], [address]th Floor        Discharge Instructions     Future Orders    Activity as tolerated     Call MD for:  difficulty breathing or increased cough     Call MD for:  increased confusion or weakness     Call MD for:  persistent dizziness, light-headedness, or visual disturbances     Call MD for:  severe uncontrolled pain     Call MD for:  temperature >100.4     Diet Cardiac         Discharge Instructions       Please get outpatient PFTs with your pulmonologist    Discharge References/Attachments     DOXYCYCLINE HYCLATE ORAL CAPSULE (ENGLISH)    ATRIAL FIBRILLATION, DISCHARGE INSTRUCTIONS FOR (ENGLISH)    ATRIAL FLUTTER/FIBRILLATION, WHAT IS (ENGLISH)    COPD, WHAT IS (ENGLISH)    SHORTNESS OF BREATH (DYSPNEA) (ENGLISH)    ADULT, PNEUMONIA (ENGLISH)    DILTIAZEM HYDROCHLORIDE ORAL TABLET (ENGLISH)    CODEINE PHOSPHATE, GUAIFENESIN ORAL SYRUP (ENGLISH)    ASTHMA, ACUTE (ADULT) (ENGLISH)    HEART FAILURE, DISCHARGE INSTRUCTIONS FOR (ENGLISH)    HEART FAILURE: MAKING CHANGES TO YOUR DIET (ENGLISH)    DIET, DISCHARGE INSTRUCTIONS FOR EATING A LOW-SALT  (ENGLISH)        Primary Diagnosis     Your primary diagnosis was:  Atrial Fibrillation (Irregular Heartbeat)      Admission Information     Date & Time Provider Department CSN    1/2/2017 11:13 AM Triny Calvillo MD [facility] [id number]      Care Providers     Provider Role Specialty Primary office phone    Triny Calvillo MD Attending Provider Internal Medicine [phone]    Triny Calvillo MD Team Attending  Internal Medicine [phone]    Ruddy Ramesh MD Consulting Physician  Pulmonary Disease [phone]      Important Medicare Message          Most Recent Value    Important Message from Medicare Regarding Discharge Appeal Rights  Given to patient/caregiver, Explained to patient/caregiver, Signed/date by patient/caregiver yes 01/06/2017 1130      Your Vitals Were      "BP Pulse Temp Resp Height Weight    139/85 (BP Location: Left arm, Patient Position: Lying, BP Method: Automatic) 96 98.5 °F (36.9 °C) (Oral) 18 5' 4" (1.626 m) 99.5 kg (219 lb 5.7 oz)    Last Period SpO2 BMI          01/21/1973 (Approximate) 98% 37.65 kg/m2        Recent Lab Values        3/17/2009 5/11/2011 5/12/2011 5/8/2015 11/8/2015 1/21/2016            5:17 AM  6:00 AM  6:00 AM  6:29 AM  5:13 AM  2:10 PM      A1C 5.7 6.4 (H) 6.4 (H) 5.9 5.7 5.9             Pending Labs     Order Current Status    Respiratory Viral Panel by PCR In process    Blood culture x two cultures. Draw prior to antibiotics. Preliminary result    Blood culture x two cultures. Draw prior to antibiotics. Preliminary result      Allergies as of 1/6/2017        Reactions    Celebrex [Celecoxib] Shortness Of Breath    Dicyclomine Hives    Adhesive Dermatitis    Avelox [Moxifloxacin] Swelling    Cilostazol Other (See Comments)    Elevates blood pressure    Eryc [Erythromycin] Other (See Comments)    unknown    Iodine And Iodide Containing Products Hives    Keflex [Cephalexin] Hives    Meclomen     rashes    Meloxicam     Ear ringing    Metoclopramide Other (See Comments)    High blood pressure    Sulfa (Sulfonamide Antibiotics) Itching      [facility]     [facility] Nurse Care Line - 24/7 Assistance  Unless otherwise directed by your provider, please contact [facility], our nurse care line that is available for 24/7 assistance.     Registered nurses in the [facility] provide clinical advisement, health education, appointment booking, and other advisory services.  Call for this free service at [phone].        Advance Directives     An advance directive is a document which, in the event you are no longer able to make decisions for yourself, tells your healthcare team what kind of treatment you do or do not want to receive, or who you would like to make those decisions for you.  If you do not currently have an " advance directive, [facility] encourages you to create one.  For more information call:  [phone]), [phone] ([phone]),  or log on to [email or web address].        Language Assistance Services     ATTENTION: Language assistance services are available, free of charge. Please call [phone].      ATENCIÓN: Si habla español, tiene a jorge disposición servicios gratuitos de asistencia lingüística. Llame al [phone].     CHÚ Ý: N?u b?n nói Ti?ng Vi?t, có các d?ch v? h? tr? ngôn ng? mi?n phí dành cho b?n. G?i s? [phone].        Stroke Education              Coumadin Discharge Instructions                         Diabetes Discharge Instructions                                    [facility] complies with applicable Federal civil rights laws and does not discriminate on the basis of race, color, national origin, age, disability, or sex.

## 2017-01-02 NOTE — ED NOTES
Left forearm continues to be red, with raised bumps noted. Pt c/o itching to left forearm. Dr. Randolph informed

## 2017-01-02 NOTE — ED NOTES
Pt has redness to left forearm, above site of IV. Mild redness started after receiving solu-medrol injection, just after blood pressure cuff inflated on left arm. Redness is continuing to worsen. Pt denies itching. Will continue to monitor

## 2017-01-02 NOTE — ED PROVIDER NOTES
Encounter Date: 1/2/2017       History     Chief Complaint   Patient presents with    Shortness of Breath     was sent to ER by [facility].  Was diagnosed with pneumonia on Friday.  Patient complaints od increase shortness of breath and tachycardic.     Review of patient's allergies indicates:   Allergen Reactions    Celebrex [celecoxib] Shortness Of Breath    Dicyclomine Hives    Adhesive Dermatitis    Avelox [moxifloxacin] Swelling    Cilostazol Other (See Comments)     Elevates blood pressure    Eryc [erythromycin] Other (See Comments)     unknown    Iodine and iodide containing products Hives    Keflex [cephalexin] Hives    Meclomen      rashes    Meloxicam      Ear ringing    Metoclopramide Other (See Comments)     High blood pressure    Sulfa (sulfonamide antibiotics) Itching     HPI Comments: 72-year-old female presents with complaint of shortness of breath, tachycardia, fatigue, and wheezing.  The patient explains that she was evaluated at an outside facility 3 days ago.  She was diagnosed with pneumonia after chest x-ray performed.  She was placed on azithromycin and sent home.  She states that despite being compliant with his medication she continues to get worse.  She describes constant, severe shortness of breath, without specific alleviating or aggravating factors.  The patient has been attempting to control her symptoms with her usual albuterol regimen with little relief.  The patient denies chest pain, hemoptysis, nausea/vomiting, abdominal pain, or any other acute complaint.    The history is provided by the patient.     Past Medical History   Diagnosis Date    Anticoagulant long-term use      on Plavix since May 2015    Anxiety     Arthritis     Asthma     Cataracts, bilateral     Chest pain, atypical     Colon polyp     Coronary artery disease     Diabetes mellitus     Dry eyes     Esophageal erosions     GERD (gastroesophageal reflux disease)     Heart murmur      Hemorrhoid     High cholesterol     Hypertension     Irritable bowel syndrome     Shingles 2015    TIA (transient ischemic attack)     Use of cane as ambulatory aid      No past medical history pertinent negatives.  Past Surgical History   Procedure Laterality Date    Total knee arthroplasty Bilateral     Nose polyp      Breast surgery       left--- a lump--- no cancer    Tonsillectomy      Left nasal polyp      Hysterectomy  partial     1982 partial hysterectomy    Total abdominal hysterectomy       2014    Left neck lymph node      Right hip fatty mass tissue      Upper gastrointestinal endoscopy  2014    Colonoscopy  2014    Abdominal surgery      Stent to small intestine      Angiocele       2007, 2014    Superior vena cava angioplasty / stenting       Family History   Problem Relation Age of Onset    Colon cancer Neg Hx     Inflammatory bowel disease Neg Hx      Social History   Substance Use Topics    Smoking status: Never Smoker    Smokeless tobacco: None    Alcohol use No     Review of Systems   Constitutional: Positive for activity change, chills and fatigue. Negative for appetite change, diaphoresis and fever.   Respiratory: Positive for cough, chest tightness, shortness of breath and wheezing.    Cardiovascular: Negative for chest pain, palpitations and leg swelling.   Gastrointestinal: Negative for abdominal pain, diarrhea, nausea and vomiting.   Genitourinary: Negative for flank pain, frequency and pelvic pain.   Musculoskeletal: Negative for back pain and myalgias.   Skin: Negative for rash.   Allergic/Immunologic: Negative for immunocompromised state.   Neurological: Negative for dizziness, weakness, light-headedness and headaches.   Hematological: Negative for adenopathy.   All other systems reviewed and are negative.      Physical Exam   Initial Vitals   BP Pulse Resp Temp SpO2   01/02/17 1055 01/02/17 1055 01/02/17 1055 01/02/17 1055 01/02/17 1055   146/72 108 18 98.2  °F (36.8 °C) 94 %     Physical Exam    Nursing note and vitals reviewed.  Constitutional: She appears well-developed and well-nourished. She is not diaphoretic. She is active and cooperative. She does not appear ill. No distress.   HENT:   Head: Normocephalic.   Right Ear: External ear normal.   Left Ear: External ear normal.   Eyes: Conjunctivae are normal. Pupils are equal, round, and reactive to light.   Neck: Normal range of motion. Neck supple. No JVD present.   Cardiovascular: Regular rhythm, S1 normal, S2 normal, normal heart sounds and intact distal pulses. Tachycardia present.  Exam reveals no gallop and no friction rub.    No murmur heard.  Pulses:       Radial pulses are 2+ on the right side, and 2+ on the left side.   Pulmonary/Chest: No accessory muscle usage. Tachypnea noted. She is in respiratory distress. She has decreased breath sounds. She has wheezes. She has rhonchi. She has no rales. She exhibits no tenderness.   Abdominal: Soft. Bowel sounds are normal. She exhibits no distension. There is no tenderness. There is no rebound and no guarding.   Musculoskeletal: Normal range of motion. She exhibits no edema or tenderness.   Neurological: She is alert and oriented to person, place, and time. She has normal strength. No sensory deficit.   Skin: Skin is warm and dry.   Psychiatric: She has a normal mood and affect.         ED Course   Procedures  Labs Reviewed   CBC W/ AUTO DIFFERENTIAL - Abnormal; Notable for the following:        Result Value    MCH 31.6 (*)     All other components within normal limits   COMPREHENSIVE METABOLIC PANEL - Abnormal; Notable for the following:     Alkaline Phosphatase 43 (*)     All other components within normal limits   MAGNESIUM - Abnormal; Notable for the following:     Magnesium 1.4 (*)     All other components within normal limits   PROTIME-INR - Abnormal; Notable for the following:     Prothrombin Time 19.7 (*)     INR 1.9 (*)     All other components within  normal limits   B-TYPE NATRIURETIC PEPTIDE - Abnormal; Notable for the following:      (*)     All other components within normal limits   URINALYSIS - Abnormal; Notable for the following:     Specific Gravity, UA <=1.005 (*)     Protein, UA Trace (*)     All other components within normal limits   POCT GLUCOSE - Abnormal; Notable for the following:     POCT Glucose 195 (*)     All other components within normal limits   CULTURE, BLOOD    Narrative:     Aerobic and anaerobic   CULTURE, BLOOD    Narrative:     Aerobic and anaerobic   CULTURE, URINE   LACTIC ACID, PLASMA   PHOSPHORUS   TROPONIN I   INFLUENZA A AND B ANTIGEN   PROCALCITONIN     EKG Readings: (Independently Interpreted)   Initial Reading: No STEMI. Heart Rate: 112. Ectopy: No Ectopy. Clinical Impression: Atrial Fibrillation with RVR          Medical Decision Making:   Initial Assessment:   Elderly female with a diagnosis of pneumonia not doing better on outpatient therapy.  Now with worsening shortness breath, cough, wheezing.  Differential Diagnosis:   Pneumonia, sepsis, metabolic derangement, asthma exacerbation, influenza  Independently Interpreted Test(s):   I have ordered and independently interpreted X-rays - see prior notes.  I have ordered and independently interpreted EKG Reading(s) - see prior notes  Clinical Tests:   Lab Tests: Ordered and Reviewed  Radiological Study: Ordered and Reviewed  Medical Tests: Ordered and Reviewed  Other:   I have discussed this case with another health care provider.  The patient's laboratory and radiology results were independently reviewed by me.  Based on the patient's history, physical, and evaluation here in the emergency department I believe the patient's condition warrants further evaluation and treatment with the internal medicine service for a working diagnosis of severe asthma exacerbation, atrial fibrillation with RVR.  I have discussed the patient's case with the on-call admission physician who  has agreed to evaluate and treat the patient.  In addition, I have discussed the results of the workup with the patient and they have verbalized understanding of the details of the workup, the diagnosis, and the need for admission.              Attending Attestation:         Attending Critical Care:   Critical Care Times:   Direct Patient Care (initial evaluation, reassessments, and time considering the case)................................................................15 minutes.   Additional History from reviewing old medical records or taking additional history from the family, EMS, PCP, etc.......................5 minutes.   Ordering, Reviewing, and Interpreting Diagnostic Studies...............................................................................................................5 minutes.   Documentation..................................................................................................................................................................................5 minutes.   Consultation with other Physicians. .................................................................................................................................................5 minutes.   ==============================================================  · Total Critical Care Time - exclusive of procedural time: 35 minutes.  ==============================================================  Critical care was necessary to treat or prevent imminent or life-threatening deterioration of the following conditions: cardiac arrhythmia.   The following critical care procedures were done by me (see procedure notes): pulse oximetry.   Critical care was time spent personally by me on the following activities: obtaining history from patient or relative, examination of patient, review of x-rays / CT sent with the patient, review of old charts, ordering lab, x-rays, and/or EKG, development of treatment plan with patient or  relative, ordering and performing treatments and interventions, evaluation of patient's response to treatment, discussion with consultants, interpretation of cardiac measurements and re-evaluation of patient's conition.   Critical Care Condition: life-threatening               ED Course     Clinical Impression:   The primary encounter diagnosis was Atrial fibrillation with RVR. A diagnosis of SOB (shortness of breath) was also pertinent to this visit.    Disposition:   Disposition: Admitted  Condition: Stable       Krzysztof Randolph MD  01/02/17 2762

## 2017-01-03 PROBLEM — J20.9 ACUTE BRONCHITIS: Status: ACTIVE | Noted: 2017-01-03

## 2017-01-03 PROBLEM — J96.00 ACUTE RESPIRATORY FAILURE: Status: ACTIVE | Noted: 2017-01-03

## 2017-01-03 LAB
ANION GAP SERPL CALC-SCNC: 9 MMOL/L
BASOPHILS # BLD AUTO: 0.01 K/UL
BASOPHILS NFR BLD: 0.1 %
BUN SERPL-MCNC: 14 MG/DL
CALCIUM SERPL-MCNC: 8.8 MG/DL
CHLORIDE SERPL-SCNC: 105 MMOL/L
CO2 SERPL-SCNC: 23 MMOL/L
CREAT SERPL-MCNC: 0.7 MG/DL
DIFFERENTIAL METHOD: ABNORMAL
EOSINOPHIL # BLD AUTO: 0 K/UL
EOSINOPHIL NFR BLD: 0 %
ERYTHROCYTE [DISTWIDTH] IN BLOOD BY AUTOMATED COUNT: 14.5 %
EST. GFR  (AFRICAN AMERICAN): >60 ML/MIN/1.73 M^2
EST. GFR  (NON AFRICAN AMERICAN): >60 ML/MIN/1.73 M^2
GLUCOSE SERPL-MCNC: 171 MG/DL
HCT VFR BLD AUTO: 38.8 %
HGB BLD-MCNC: 12.7 G/DL
INR PPP: 1.8
LYMPHOCYTES # BLD AUTO: 1.2 K/UL
LYMPHOCYTES NFR BLD: 12.6 %
MAGNESIUM SERPL-MCNC: 1.8 MG/DL
MCH RBC QN AUTO: 31.3 PG
MCHC RBC AUTO-ENTMCNC: 32.7 %
MCV RBC AUTO: 96 FL
MONOCYTES # BLD AUTO: 0.2 K/UL
MONOCYTES NFR BLD: 2 %
NEUTROPHILS # BLD AUTO: 8 K/UL
NEUTROPHILS NFR BLD: 85.3 %
PLATELET # BLD AUTO: 224 K/UL
PMV BLD AUTO: 10.3 FL
POCT GLUCOSE: 130 MG/DL (ref 70–110)
POCT GLUCOSE: 135 MG/DL (ref 70–110)
POCT GLUCOSE: 190 MG/DL (ref 70–110)
POTASSIUM SERPL-SCNC: 4 MMOL/L
PROTHROMBIN TIME: 18.5 SEC
RBC # BLD AUTO: 4.06 M/UL
SODIUM SERPL-SCNC: 137 MMOL/L
TROPONIN I SERPL DL<=0.01 NG/ML-MCNC: 0.01 NG/ML
WBC # BLD AUTO: 9.38 K/UL

## 2017-01-03 PROCEDURE — 85025 COMPLETE CBC W/AUTO DIFF WBC: CPT

## 2017-01-03 PROCEDURE — 80048 BASIC METABOLIC PNL TOTAL CA: CPT

## 2017-01-03 PROCEDURE — 63600175 PHARM REV CODE 636 W HCPCS: Performed by: HOSPITALIST

## 2017-01-03 PROCEDURE — 25000003 PHARM REV CODE 250: Performed by: HOSPITALIST

## 2017-01-03 PROCEDURE — 36415 COLL VENOUS BLD VENIPUNCTURE: CPT

## 2017-01-03 PROCEDURE — 94640 AIRWAY INHALATION TREATMENT: CPT

## 2017-01-03 PROCEDURE — 25000003 PHARM REV CODE 250

## 2017-01-03 PROCEDURE — 94761 N-INVAS EAR/PLS OXIMETRY MLT: CPT

## 2017-01-03 PROCEDURE — 25000003 PHARM REV CODE 250: Performed by: INTERNAL MEDICINE

## 2017-01-03 PROCEDURE — 94664 DEMO&/EVAL PT USE INHALER: CPT

## 2017-01-03 PROCEDURE — 84484 ASSAY OF TROPONIN QUANT: CPT

## 2017-01-03 PROCEDURE — 25000242 PHARM REV CODE 250 ALT 637 W/ HCPCS: Performed by: HOSPITALIST

## 2017-01-03 PROCEDURE — 85610 PROTHROMBIN TIME: CPT

## 2017-01-03 PROCEDURE — 27000221 HC OXYGEN, UP TO 24 HOURS

## 2017-01-03 PROCEDURE — 25000003 PHARM REV CODE 250: Performed by: STUDENT IN AN ORGANIZED HEALTH CARE EDUCATION/TRAINING PROGRAM

## 2017-01-03 PROCEDURE — 83735 ASSAY OF MAGNESIUM: CPT

## 2017-01-03 PROCEDURE — 11000001 HC ACUTE MED/SURG PRIVATE ROOM

## 2017-01-03 RX ORDER — DOXYCYCLINE HYCLATE 100 MG
100 TABLET ORAL EVERY 12 HOURS
Status: DISCONTINUED | OUTPATIENT
Start: 2017-01-03 | End: 2017-01-06 | Stop reason: HOSPADM

## 2017-01-03 RX ORDER — CETIRIZINE HYDROCHLORIDE 5 MG/1
5 TABLET ORAL DAILY
Status: DISCONTINUED | OUTPATIENT
Start: 2017-01-03 | End: 2017-01-06 | Stop reason: HOSPADM

## 2017-01-03 RX ORDER — DILTIAZEM HYDROCHLORIDE 120 MG/1
240 CAPSULE, COATED, EXTENDED RELEASE ORAL DAILY
Status: DISCONTINUED | OUTPATIENT
Start: 2017-01-04 | End: 2017-01-05

## 2017-01-03 RX ORDER — DOCUSATE SODIUM 100 MG/1
100 CAPSULE, LIQUID FILLED ORAL 2 TIMES DAILY
Status: DISCONTINUED | OUTPATIENT
Start: 2017-01-03 | End: 2017-01-06 | Stop reason: HOSPADM

## 2017-01-03 RX ORDER — CODEINE PHOSPHATE AND GUAIFENESIN 10; 100 MG/5ML; MG/5ML
10 SOLUTION ORAL EVERY 8 HOURS PRN
Status: DISCONTINUED | OUTPATIENT
Start: 2017-01-03 | End: 2017-01-06 | Stop reason: HOSPADM

## 2017-01-03 RX ORDER — DILTIAZEM HYDROCHLORIDE 120 MG/1
120 CAPSULE, COATED, EXTENDED RELEASE ORAL ONCE
Status: COMPLETED | OUTPATIENT
Start: 2017-01-03 | End: 2017-01-03

## 2017-01-03 RX ORDER — SENNOSIDES 8.6 MG/1
8.6 TABLET ORAL DAILY PRN
Status: DISCONTINUED | OUTPATIENT
Start: 2017-01-03 | End: 2017-01-06 | Stop reason: HOSPADM

## 2017-01-03 RX ORDER — POLYETHYLENE GLYCOL 3350 17 G/17G
17 POWDER, FOR SOLUTION ORAL ONCE
Status: DISCONTINUED | OUTPATIENT
Start: 2017-01-03 | End: 2017-01-03

## 2017-01-03 RX ORDER — POLYETHYLENE GLYCOL 3350 17 G/17G
17 POWDER, FOR SOLUTION ORAL DAILY PRN
Status: DISCONTINUED | OUTPATIENT
Start: 2017-01-03 | End: 2017-01-06 | Stop reason: HOSPADM

## 2017-01-03 RX ADMIN — AZELASTINE HYDROCHLORIDE 137 MCG: 137 SPRAY, METERED NASAL at 09:01

## 2017-01-03 RX ADMIN — AZELASTINE HYDROCHLORIDE 137 MCG: 137 SPRAY, METERED NASAL at 08:01

## 2017-01-03 RX ADMIN — IPRATROPIUM BROMIDE AND ALBUTEROL SULFATE 3 ML: .5; 3 SOLUTION RESPIRATORY (INHALATION) at 11:01

## 2017-01-03 RX ADMIN — DOCUSATE SODIUM 100 MG: 100 CAPSULE, LIQUID FILLED ORAL at 09:01

## 2017-01-03 RX ADMIN — LISINOPRIL 20 MG: 20 TABLET ORAL at 08:01

## 2017-01-03 RX ADMIN — IPRATROPIUM BROMIDE AND ALBUTEROL SULFATE 3 ML: .5; 3 SOLUTION RESPIRATORY (INHALATION) at 07:01

## 2017-01-03 RX ADMIN — IPRATROPIUM BROMIDE AND ALBUTEROL SULFATE 3 ML: .5; 3 SOLUTION RESPIRATORY (INHALATION) at 03:01

## 2017-01-03 RX ADMIN — GUAIFENESIN AND CODEINE PHOSPHATE 10 ML: 100; 10 SOLUTION ORAL at 05:01

## 2017-01-03 RX ADMIN — Medication 1 G: at 08:01

## 2017-01-03 RX ADMIN — CETIRIZINE HYDROCHLORIDE 5 MG: 5 TABLET, FILM COATED ORAL at 01:01

## 2017-01-03 RX ADMIN — POLYETHYLENE GLYCOL 3350 17 G: 17 POWDER, FOR SOLUTION ORAL at 09:01

## 2017-01-03 RX ADMIN — IPRATROPIUM BROMIDE AND ALBUTEROL SULFATE 3 ML: .5; 3 SOLUTION RESPIRATORY (INHALATION) at 09:01

## 2017-01-03 RX ADMIN — PANTOPRAZOLE SODIUM 40 MG: 40 TABLET, DELAYED RELEASE ORAL at 08:01

## 2017-01-03 RX ADMIN — WARFARIN SODIUM 5 MG: 5 TABLET ORAL at 05:01

## 2017-01-03 RX ADMIN — FERROUS SULFATE TAB EC 325 MG (65 MG FE EQUIVALENT) 325 MG: 325 (65 FE) TABLET DELAYED RESPONSE at 08:01

## 2017-01-03 RX ADMIN — MONTELUKAST SODIUM 10 MG: 10 TABLET, FILM COATED ORAL at 09:01

## 2017-01-03 RX ADMIN — PREDNISONE 50 MG: 20 TABLET ORAL at 08:01

## 2017-01-03 RX ADMIN — VANCOMYCIN HYDROCHLORIDE 1500 MG: 1 INJECTION, POWDER, LYOPHILIZED, FOR SOLUTION INTRAVENOUS at 02:01

## 2017-01-03 RX ADMIN — FLUTICASONE PROPIONATE 1 SPRAY: 50 SPRAY, METERED NASAL at 08:01

## 2017-01-03 RX ADMIN — CLOPIDOGREL BISULFATE 75 MG: 75 TABLET ORAL at 08:01

## 2017-01-03 RX ADMIN — DILTIAZEM HYDROCHLORIDE 120 MG: 120 CAPSULE, EXTENDED RELEASE ORAL at 10:01

## 2017-01-03 RX ADMIN — OXYBUTYNIN CHLORIDE 5 MG: 5 TABLET, EXTENDED RELEASE ORAL at 08:01

## 2017-01-03 RX ADMIN — FLUTICASONE FUROATE AND VILANTEROL TRIFENATATE 1 PUFF: 100; 25 POWDER RESPIRATORY (INHALATION) at 07:01

## 2017-01-03 RX ADMIN — VITAM B12 100 MCG: 100 TAB at 08:01

## 2017-01-03 RX ADMIN — DOXYCYCLINE HYCLATE 100 MG: 100 TABLET, COATED ORAL at 09:01

## 2017-01-03 RX ADMIN — DILTIAZEM HYDROCHLORIDE 180 MG: 180 CAPSULE, COATED, EXTENDED RELEASE ORAL at 08:01

## 2017-01-03 RX ADMIN — DOXYCYCLINE HYCLATE 100 MG: 100 TABLET, COATED ORAL at 10:01

## 2017-01-03 NOTE — PLAN OF CARE
Problem: Arrhythmia/Dysrhythmia (Symptomatic) (Adult)  Goal: Signs and Symptoms of Listed Potential Problems Will be Absent, Minimized or Managed (Arrhythmia/Dysrhythmia)  Signs and symptoms of listed potential problems will be absent, minimized or managed by discharge/transition of care (reference Arrhythmia/Dysrhythmia (Symptomatic) (Adult) CPG).  Outcome: Ongoing (interventions implemented as appropriate)  Patient is currently running AFIB on the monitor at a controlled rate between 66-88.

## 2017-01-03 NOTE — PLAN OF CARE
Problem: Patient Care Overview  Goal: Plan of Care Review  SpO2   94% on  2 lpm NC. No apparent distress noted. Will continue to monitor.

## 2017-01-03 NOTE — PLAN OF CARE
Problem: Pneumonia (Adult)  Goal: Signs and Symptoms of Listed Potential Problems Will be Absent, Minimized or Managed (Pneumonia)  Signs and symptoms of listed potential problems will be absent, minimized or managed by discharge/transition of care (reference Pneumonia (Adult) CPG).  Outcome: Ongoing (interventions implemented as appropriate)  Patient is currently on 2 liters of oxygen and SAT are 98%. Is receiving NEB treatment Q4 and PRN as needed. Patient also receiving IV antibiotics.

## 2017-01-03 NOTE — PLAN OF CARE
01/03/17 1119   Discharge Assessment   Assessment Type Discharge Planning Assessment   Confirmed/corrected address and phone number on facesheet? Yes   Assessment information obtained from? Patient   Expected Length of Stay (days) 1   Communicated expected length of stay with patient/caregiver yes   Type of Healthcare Directive Received Advance Directive;Durable power of  for health care   Prior to hospitilization cognitive status: Alert/Oriented   Prior to hospitalization functional status: Independent;Assistive Equipment   Current cognitive status: Alert/Oriented   Current Functional Status: Independent;Assistive Equipment   Arrived From home or self-care   Lives With spouse   Able to Return to Prior Arrangements yes   Is patient able to care for self after discharge? Yes   How many people do you have in your home that can help with your care after discharge? 1   Who are your caregiver(s) and their phone number(s)? Anthony(spouse) [phone]   Patient's perception of discharge disposition home or selfcare   Readmission Within The Last 30 Days no previous admission in last 30 days   Patient currently being followed by outpatient case management? No   Patient currently receives home health services? No   Does the patient currently use HME? No   Patient currently receives private duty nursing? No   Patient currently receives any other outside agency services? No   Equipment Currently Used at Home cane, straight   Do you have any problems affording any of your prescribed medications? No   Is the patient taking medications as prescribed? yes   Do you have any financial concerns preventing you from receiving the healthcare you need? No   Does the patient have transportation to healthcare appointments? Yes   Transportation Available family or friend will provide   On Dialysis? No   Does the patient receive services at the Coumadin Clinic? No   Are there any open cases? No   Discharge Plan A Home   Discharge  Plan B Home Health   Patient/Family In Agreement With Plan yes

## 2017-01-03 NOTE — PLAN OF CARE
Problem: Patient Care Overview  Goal: Plan of Care Review  Outcome: Ongoing (interventions implemented as appropriate)  Pt on 2 lpm NC. SpO2 98%. Aero tx given, tolerated well. Will continue to monitor.

## 2017-01-03 NOTE — PLAN OF CARE
Did assessment on patient. Went over plan of care. Bed in low position, call light in reach.Safety measure taken. Patients' daughter is at the bedside and staying with her mother overnight.

## 2017-01-03 NOTE — PROGRESS NOTES
"[facility] Internal Medicine Resident HO-2 Progress Note    Subjective:      Ms. Strong reports no complaints. She has WHITLEY and SOB at rest. She denies N/V, diarrhea. She denies chest pain, but feels a funny feeling with irregular heart beat. Denies GERD symptoms.     Objective:   Last 24 Hour Vital Signs:  BP  Min: 108/65  Max: 146/72  Temp  Av.4 °F (36.9 °C)  Min: 97.4 °F (36.3 °C)  Max: 99.8 °F (37.7 °C)  Pulse  Av  Min: 60  Max: 131  Resp  Av.7  Min: 15  Max: 26  SpO2  Av.1 %  Min: 91 %  Max: 100 %  Height  Av' 4" (162.6 cm)  Min: 5' 4" (162.6 cm)  Max: 5' 4" (162.6 cm)  Weight  Av.1 kg (220 lb 10.9 oz)  Min: 99.5 kg (219 lb 5.7 oz)  Max: 100.7 kg (222 lb)  I/O last 3 completed shifts:  In: -   Out: 400 [Urine:400]    Physical Examination:  Gen: awake, alert, on nasal canula  Head: NCAT   Eyes: PERRL EOMI  CV: Tachycardic, irregularly irregular, no murmur appreciated  Pulm: no increase WOB, crackles, rhonchi  ABD: Soft, nontender, nondistended  Ext: no c/c/e  Neuro: non-focal    Laboratory:  Glucose 170-190    CBC stable    INR 1.8    Mag 1.8    Microbiology:  2016 BCx NGTD  2016 UCx pending    Other Results:  EKG (my interpretation):   2016 AFRVR  possible LVH  No repeat this AM    Radiology:  Nothing new for review    Current Medications:     Infusions:        Scheduled:   albuterol-ipratropium 2.5mg-0.5mg/3mL  3 mL Nebulization Q4H WAKE    azelastine  1 spray Nasal BID    clopidogrel  75 mg Oral Daily    cyanocobalamin  100 mcg Oral Daily    diltiaZEM  180 mg Oral Daily    ferrous sulfate  325 mg Oral Daily with breakfast    fluticasone  1 spray Each Nare Daily    fluticasone-vilanterol  1 puff Inhalation Daily    lisinopril  20 mg Oral Daily    meropenem 1 g in sodium chloride 0.9 % 100 mL IVPB (ready to mix system)  1 g Intravenous Q8H    montelukast  10 mg Oral QHS    oxybutynin  5 mg Oral Daily    pantoprazole  40 mg Oral Daily    predniSONE  50 mg Oral " Daily    vancomycin (VANCOCIN) IVPB  15 mg/kg Intravenous Q12H    warfarin  5 mg Oral Every Tues, Thurs, Sat, Sun    [START ON 1/4/2017] warfarin  7.5 mg Oral Every Mon, Wed, Fri        PRN:  albuterol-ipratropium 2.5mg-0.5mg/3mL, dextrose 50%, dextrose 50%, glucagon (human recombinant), glucose, glucose, insulin aspart, ondansetron    Assessment:     Adriana Strong is a 72 y.o.female with  Patient Active Problem List    Diagnosis Date Noted    Atrial fibrillation with RVR 01/02/2017    COPD exacerbation 01/02/2017    Old lacunar stroke without late effect 12/09/2016    Warfarin anticoagulation 07/22/2016    Paroxysmal atrial fibrillation 07/08/2016    Shortness of breath 07/01/2016    SOB (shortness of breath) 07/01/2016    Hyperlipidemia 06/17/2016    Chest pain 06/17/2016    Essential hypertension 04/08/2016    Gastroesophageal reflux disease     Acute thalamic infarction 05/08/2015    Allergy to iodine 05/08/2015    Chronic mesenteric ischemia 04/16/2015    Weight loss 11/24/2014    Enlarged ovary 11/24/2014    Diabetes type 2, controlled 11/24/2014    Asthma 11/24/2014    HLD (hyperlipidemia) 11/24/2014        Plan:     Paroxysmal Atrial Fibrillation with RVR  - HR controlled <115 overnight  on home dilt 180 this AM  follows with Dr. Carrington  -KELVIN-Vasc 5, on warfarin 5 mg T, TH, Sat, Sun, and 7.5 mg MWF, continue and follow daily INRs  - repleting fluids     COPD Exacerbation vs CAP  - patient with diffuse wheezing and rhonchi on exam, dry cough  - stopped home azithromycin at home  changes to vanc/leona per ED  continue for now  - Duo-nebs, solumedrol  not on home O2     Chronic Mesenteric Ischemia  - Diagnosed in 2015  continue home plavix  follows with [facility] vascular     HTN  - Continue lisinopril 20 mg daily     Pre-Diabetes  - A1c 5.9 in 1/2016  monitor on steroids with SSI    GERD  - continue pantoprazole    PPX  - on warfarin    Dispo  - pending clinical improvement  of symptoms    Cat MD Jim, MPH  [facility] Internal Medicine, HO-2  [phone]    [facility] Medicine Hospitalist Pager numbers:   [facility] Hospitalist Medicine Team A (Rajinder/Isis): [phone]  [facility] Hospitalist Medicine Team B (Antoni/Carleen):  [phone]

## 2017-01-03 NOTE — PLAN OF CARE
No ectopy noted on tele. Call light in reach. Bed in low position. Report given to AM nurse. Safety measures taken.

## 2017-01-04 PROBLEM — K59.00 CONSTIPATION: Status: ACTIVE | Noted: 2017-01-04

## 2017-01-04 LAB
ALBUMIN SERPL BCP-MCNC: 3.2 G/DL
ALP SERPL-CCNC: 38 U/L
ALT SERPL W/O P-5'-P-CCNC: 27 U/L
ANION GAP SERPL CALC-SCNC: 8 MMOL/L
AST SERPL-CCNC: 23 U/L
BACTERIA UR CULT: NORMAL
BASOPHILS # BLD AUTO: 0.02 K/UL
BASOPHILS NFR BLD: 0.2 %
BILIRUB SERPL-MCNC: 0.3 MG/DL
BUN SERPL-MCNC: 18 MG/DL
CALCIUM SERPL-MCNC: 8.9 MG/DL
CHLORIDE SERPL-SCNC: 107 MMOL/L
CO2 SERPL-SCNC: 24 MMOL/L
CREAT SERPL-MCNC: 0.7 MG/DL
DIFFERENTIAL METHOD: ABNORMAL
EOSINOPHIL # BLD AUTO: 0 K/UL
EOSINOPHIL NFR BLD: 0 %
ERYTHROCYTE [DISTWIDTH] IN BLOOD BY AUTOMATED COUNT: 14.4 %
EST. GFR  (AFRICAN AMERICAN): >60 ML/MIN/1.73 M^2
EST. GFR  (NON AFRICAN AMERICAN): >60 ML/MIN/1.73 M^2
ESTIMATED PA SYSTOLIC PRESSURE: 33.81
GLUCOSE SERPL-MCNC: 115 MG/DL
HCT VFR BLD AUTO: 36.5 %
HGB BLD-MCNC: 11.6 G/DL
INR PPP: 1.6
LYMPHOCYTES # BLD AUTO: 2 K/UL
LYMPHOCYTES NFR BLD: 16.3 %
MAGNESIUM SERPL-MCNC: 1.9 MG/DL
MCH RBC QN AUTO: 30.8 PG
MCHC RBC AUTO-ENTMCNC: 31.8 %
MCV RBC AUTO: 97 FL
MITRAL VALVE REGURGITATION: NORMAL
MONOCYTES # BLD AUTO: 1 K/UL
MONOCYTES NFR BLD: 7.7 %
NEUTROPHILS # BLD AUTO: 9.3 K/UL
NEUTROPHILS NFR BLD: 75.5 %
PLATELET # BLD AUTO: 220 K/UL
PMV BLD AUTO: 10.2 FL
POCT GLUCOSE: 105 MG/DL (ref 70–110)
POCT GLUCOSE: 117 MG/DL (ref 70–110)
POCT GLUCOSE: 124 MG/DL (ref 70–110)
POCT GLUCOSE: 175 MG/DL (ref 70–110)
POCT GLUCOSE: 194 MG/DL (ref 70–110)
POTASSIUM SERPL-SCNC: 4.5 MMOL/L
PROT SERPL-MCNC: 6.3 G/DL
PROTHROMBIN TIME: 16.7 SEC
RBC # BLD AUTO: 3.77 M/UL
RETIRED EF AND QEF - SEE NOTES: 55 (ref 55–65)
SODIUM SERPL-SCNC: 139 MMOL/L
TRICUSPID VALVE REGURGITATION: NORMAL
WBC # BLD AUTO: 12.36 K/UL

## 2017-01-04 PROCEDURE — 94667 MNPJ CHEST WALL 1ST: CPT

## 2017-01-04 PROCEDURE — 25000003 PHARM REV CODE 250: Performed by: STUDENT IN AN ORGANIZED HEALTH CARE EDUCATION/TRAINING PROGRAM

## 2017-01-04 PROCEDURE — 11000001 HC ACUTE MED/SURG PRIVATE ROOM

## 2017-01-04 PROCEDURE — 94640 AIRWAY INHALATION TREATMENT: CPT

## 2017-01-04 PROCEDURE — 63600175 PHARM REV CODE 636 W HCPCS: Performed by: HOSPITALIST

## 2017-01-04 PROCEDURE — 25000003 PHARM REV CODE 250

## 2017-01-04 PROCEDURE — 83735 ASSAY OF MAGNESIUM: CPT

## 2017-01-04 PROCEDURE — 85025 COMPLETE CBC W/AUTO DIFF WBC: CPT

## 2017-01-04 PROCEDURE — 36415 COLL VENOUS BLD VENIPUNCTURE: CPT

## 2017-01-04 PROCEDURE — 25000003 PHARM REV CODE 250: Performed by: INTERNAL MEDICINE

## 2017-01-04 PROCEDURE — 93306 TTE W/DOPPLER COMPLETE: CPT

## 2017-01-04 PROCEDURE — 85610 PROTHROMBIN TIME: CPT

## 2017-01-04 PROCEDURE — 94668 MNPJ CHEST WALL SBSQ: CPT

## 2017-01-04 PROCEDURE — 25000003 PHARM REV CODE 250: Performed by: HOSPITALIST

## 2017-01-04 PROCEDURE — 25000242 PHARM REV CODE 250 ALT 637 W/ HCPCS: Performed by: HOSPITALIST

## 2017-01-04 PROCEDURE — 87633 RESP VIRUS 12-25 TARGETS: CPT

## 2017-01-04 PROCEDURE — 80053 COMPREHEN METABOLIC PANEL: CPT

## 2017-01-04 PROCEDURE — 94761 N-INVAS EAR/PLS OXIMETRY MLT: CPT

## 2017-01-04 PROCEDURE — 27000221 HC OXYGEN, UP TO 24 HOURS

## 2017-01-04 RX ORDER — BENZONATATE 100 MG/1
100 CAPSULE ORAL 3 TIMES DAILY PRN
Status: DISCONTINUED | OUTPATIENT
Start: 2017-01-04 | End: 2017-01-06 | Stop reason: HOSPADM

## 2017-01-04 RX ORDER — RAMELTEON 8 MG/1
8 TABLET ORAL NIGHTLY PRN
Status: DISCONTINUED | OUTPATIENT
Start: 2017-01-04 | End: 2017-01-06 | Stop reason: HOSPADM

## 2017-01-04 RX ADMIN — AZELASTINE HYDROCHLORIDE 137 MCG: 137 SPRAY, METERED NASAL at 09:01

## 2017-01-04 RX ADMIN — IPRATROPIUM BROMIDE AND ALBUTEROL SULFATE 3 ML: .5; 3 SOLUTION RESPIRATORY (INHALATION) at 11:01

## 2017-01-04 RX ADMIN — LISINOPRIL 20 MG: 20 TABLET ORAL at 09:01

## 2017-01-04 RX ADMIN — CLOPIDOGREL BISULFATE 75 MG: 75 TABLET ORAL at 09:01

## 2017-01-04 RX ADMIN — VITAM B12 100 MCG: 100 TAB at 09:01

## 2017-01-04 RX ADMIN — WARFARIN SODIUM 7.5 MG: 2.5 TABLET ORAL at 05:01

## 2017-01-04 RX ADMIN — DOCUSATE SODIUM 100 MG: 100 CAPSULE, LIQUID FILLED ORAL at 09:01

## 2017-01-04 RX ADMIN — GUAIFENESIN AND CODEINE PHOSPHATE 10 ML: 100; 10 SOLUTION ORAL at 09:01

## 2017-01-04 RX ADMIN — GUAIFENESIN AND CODEINE PHOSPHATE 10 ML: 100; 10 SOLUTION ORAL at 12:01

## 2017-01-04 RX ADMIN — BENZONATATE 100 MG: 100 CAPSULE ORAL at 04:01

## 2017-01-04 RX ADMIN — IPRATROPIUM BROMIDE AND ALBUTEROL SULFATE 3 ML: .5; 3 SOLUTION RESPIRATORY (INHALATION) at 07:01

## 2017-01-04 RX ADMIN — POLYETHYLENE GLYCOL 3350 17 G: 17 POWDER, FOR SOLUTION ORAL at 09:01

## 2017-01-04 RX ADMIN — DOXYCYCLINE HYCLATE 100 MG: 100 TABLET, COATED ORAL at 09:01

## 2017-01-04 RX ADMIN — IPRATROPIUM BROMIDE AND ALBUTEROL SULFATE 3 ML: .5; 3 SOLUTION RESPIRATORY (INHALATION) at 08:01

## 2017-01-04 RX ADMIN — PANTOPRAZOLE SODIUM 40 MG: 40 TABLET, DELAYED RELEASE ORAL at 09:01

## 2017-01-04 RX ADMIN — FLUTICASONE PROPIONATE 1 SPRAY: 50 SPRAY, METERED NASAL at 09:01

## 2017-01-04 RX ADMIN — FERROUS SULFATE TAB EC 325 MG (65 MG FE EQUIVALENT) 325 MG: 325 (65 FE) TABLET DELAYED RESPONSE at 09:01

## 2017-01-04 RX ADMIN — FLUTICASONE FUROATE AND VILANTEROL TRIFENATATE 1 PUFF: 100; 25 POWDER RESPIRATORY (INHALATION) at 07:01

## 2017-01-04 RX ADMIN — PREDNISONE 50 MG: 20 TABLET ORAL at 09:01

## 2017-01-04 RX ADMIN — DILTIAZEM HYDROCHLORIDE 240 MG: 120 CAPSULE, EXTENDED RELEASE ORAL at 09:01

## 2017-01-04 RX ADMIN — CETIRIZINE HYDROCHLORIDE 5 MG: 5 TABLET, FILM COATED ORAL at 09:01

## 2017-01-04 RX ADMIN — OXYBUTYNIN CHLORIDE 5 MG: 5 TABLET, EXTENDED RELEASE ORAL at 09:01

## 2017-01-04 RX ADMIN — IPRATROPIUM BROMIDE AND ALBUTEROL SULFATE 3 ML: .5; 3 SOLUTION RESPIRATORY (INHALATION) at 04:01

## 2017-01-04 RX ADMIN — BENZONATATE 100 MG: 100 CAPSULE ORAL at 09:01

## 2017-01-04 RX ADMIN — MONTELUKAST SODIUM 10 MG: 10 TABLET, FILM COATED ORAL at 09:01

## 2017-01-04 RX ADMIN — IPRATROPIUM BROMIDE AND ALBUTEROL SULFATE 3 ML: .5; 3 SOLUTION RESPIRATORY (INHALATION) at 03:01

## 2017-01-04 NOTE — CONSULTS
Consult Note  [facility]    Reason for Consult:  Asthma Exacerbation, unresolving  SUBJECTIVE:     HPI: Pt is a 71yo female with a PMHx of Asthma, CAD, DM, GERD, HTN, Chronic Mesenteric Ischemia (on Plavix), TIA, and Paroxysmal AF (on warfarin) admitted through the ED on January 2 with SOB, tachycardia, and wheezing.    On December 30, pt was diagnosed with pneumonia at an urgent care center - started on Azithromycin. At the time of visit to urgent care, she was noted to be in AF and urged to follow-up with her doctor ASAP. On January 2, pt came to the ED as her SOB/cough had worsened despite antibiotics. Also found to be in AF with RVR. Of note, pt has a history of provoked LUE DVT in 2014.    Treated as an asthma / COPD / Post-nasal drip exacerbation upon admission with Duonebs, Solumedrol => Prednisone, Singulair, Doxycycline, Flonaze, Zyrtec.     Review of patient's allergies indicates:   Allergen Reactions    Celebrex [celecoxib] Shortness Of Breath    Dicyclomine Hives    Adhesive Dermatitis    Avelox [moxifloxacin] Swelling    Cilostazol Other (See Comments)     Elevates blood pressure    Eryc [erythromycin] Other (See Comments)     unknown    Iodine and iodide containing products Hives    Keflex [cephalexin] Hives    Meclomen      rashes    Meloxicam      Ear ringing    Metoclopramide Other (See Comments)     High blood pressure    Sulfa (sulfonamide antibiotics) Itching       Past Medical History   Diagnosis Date    Anticoagulant long-term use      on Plavix since May 2015    Anxiety     Arthritis     Asthma     Cataracts, bilateral     Chest pain, atypical     Colon polyp     Coronary artery disease     Diabetes mellitus     Dry eyes     Esophageal erosions     GERD (gastroesophageal reflux disease)     Heart murmur     Hemorrhoid     High cholesterol     Hypertension     Irritable bowel syndrome     Shingles 2015    TIA (transient ischemic attack)      Use of cane as ambulatory aid      Past Surgical History   Procedure Laterality Date    Total knee arthroplasty Bilateral     Nose polyp      Breast surgery       left--- a lump--- no cancer    Tonsillectomy      Left nasal polyp      Hysterectomy  partial     1982 partial hysterectomy    Total abdominal hysterectomy       2014    Left neck lymph node      Right hip fatty mass tissue      Upper gastrointestinal endoscopy  2014    Colonoscopy  2014    Abdominal surgery      Stent to small intestine      Angiocele       2007, 2014    Superior vena cava angioplasty / stenting       Family History   Problem Relation Age of Onset    Colon cancer Neg Hx     Inflammatory bowel disease Neg Hx      Social History   Substance Use Topics    Smoking status: Never Smoker    Smokeless tobacco: None    Alcohol use No       Review of Systems:  As per HPI    OBJECTIVE:     Vital Signs:  Temp:  [96.4 °F (35.8 °C)-98.7 °F (37.1 °C)]   Pulse:  []   Resp:  [14-20]   BP: (116-150)/(56-85)   SpO2:  [94 %-99 %]     Physical Exam:  General: well developed, well nourished, moderate distress  Lungs:  rales bibasilar and bilaterally and wheezes bilaterally  Cardiovascular: Heart: irregularly irregular rhythm. Chest Wall: no tenderness. Extremities: edema 2+. Pulses: 2+ and symmetric.  Abdomen/Rectal: Abdomen: soft, non-tender non-distented; bowel sounds normal; no masses,  no organomegaly. Rectal: not examined    Laboratory:  CBC:   Recent Labs  Lab 01/04/17  0311   WBC 12.36   RBC 3.77*   HGB 11.6*   HCT 36.5*      MCV 97   MCH 30.8   MCHC 31.8*     CMP:   Recent Labs  Lab 01/04/17  0311   *   CALCIUM 8.9   ALBUMIN 3.2*   PROT 6.3      K 4.5   CO2 24      BUN 18   CREATININE 0.7   ALKPHOS 38*   ALT 27   AST 23   BILITOT 0.3     Coagulation:   Recent Labs  Lab 01/04/17  0311   INR 1.6*     Cardiac markers:   Recent Labs  Lab 01/03/17  1203   TROPONINI 0.008     ABGs:   Recent Labs  Lab  01/02/17  1827   PH 7.376   PCO2 38.1   PO2 99   HCO3 22.3*   POCSATURATED 98   BE -3     Microbiology Results (last 7 days)     Procedure Component Value Units Date/Time    Respiratory virus antigens panel [[id number]]     Order Status:  No result Specimen:  Respiratory from Nasopharyngeal Swab     Urine culture [[id number]] Collected:  01/02/17 1222    Order Status:  Completed Specimen:  Urine from Urine, Clean Catch Updated:  01/04/17 0355     Urine Culture, Routine No significant growth    Blood culture x two cultures. Draw prior to antibiotics. [[id number]] Collected:  01/02/17 1155    Order Status:  Completed Specimen:  Blood from Peripheral, Forearm, Right Updated:  01/03/17 1812     Blood Culture, Routine No Growth to date     Blood Culture, Routine No Growth to date    Narrative:       Aerobic and anaerobic    Blood culture x two cultures. Draw prior to antibiotics. [[id number]] Collected:  01/02/17 1257    Order Status:  Completed Specimen:  Blood from Peripheral, Wrist, Left Updated:  01/03/17 1812     Blood Culture, Routine No Growth to date     Blood Culture, Routine No Growth to date    Narrative:       Aerobic and anaerobic        Specimen     None          Recent Labs  Lab 01/02/17  1222   COLORU Yellow   SPECGRAV <=1.005*   PHUR 7.0   PROTEINUA Trace*   NITRITE Negative   LEUKOCYTESUR Negative   UROBILINOGEN Negative     {Results:  Diagnostic Results:  Labs: Reviewed  ECG: Reviewed  X-Ray: Reviewed    ASSESSMENT/PLAN:     Pt is a 71yo female with a PMHx of Asthma, CAD, DM, GERD, HTN, Chronic Mesenteric Ischemia (on Plavix), TIA, and Paroxysmal AF (on warfarin) admitted through the ED on January 2 with SOB, tachycardia, and wheezing.    SOB  · Persistent SOB despite aggressive symptomatic therapy  · Pt states that she is up-to-date with flu vaccine and pneumovax -   · Respiratory Viral Panel (ordered)  · Persistent wheezing -   · Recommend increasing PO steroid dosing (would recommend going back to IV  solumedrol, but there is mention of local skin eruption at injection site in ED notes)  · Continue duonebs, singulair, flonase, zyrtec  · Persistent pleuritic chest pain and SOB- given history of provoked DVT in LUE, some concern for PE. Transthoracic echo complete. Grossly fluid overloaded.   · Recommend close follow-up of results to rule-out right heart strain  · Recommend venous ultrasound of upper and lower extremities to rule-out DVT  · Recommend trial of lasix 40mg IV x1 to see if this improves her symptoms    Dallin Whitman MD  Fellow, [facility]  Pager [phone]

## 2017-01-04 NOTE — NURSING
Plan of care reviewed with patient. Patient verbalized complete understanding. Fall precautions maintained. Bed in lowest position, locked, call light within reach and bed alarm set to level 1. Pt aware . Side rails up x's 2 with slip resistant socks on. Nurse instructed patient to notify staff for any assistance and pt verbalized complete understanding.     Patient on telemetry throughout shift with no ectopy noted. Will continue to monitor.

## 2017-01-04 NOTE — PLAN OF CARE
Problem: Patient Care Overview  Goal: Plan of Care Review  Patient on RA, no respiratory distress noted. Will continue to monitor.   Outcome: Ongoing (interventions implemented as appropriate)  Pt on continuous O2 sats 99%. No Distress noted.

## 2017-01-04 NOTE — PLAN OF CARE
Did assessment on patient. Went over plan of care. Bed in low position, call light in reach.Saftey measure taken.

## 2017-01-04 NOTE — PROGRESS NOTES
[facility] Resident HO-2 Progress Note    Subjective:      Ms. Strong reports that she did not sleep very well last night because she was short of breath and her chest felt tight which was relieved with a breathing treatment. She asked for miralax and colace to try to have a bowel movement today since today will be day 3 without a BM. This is her home regimen. She also got tessalon pearls and cough syrup all night. She thinks her palpitations are improved since yesterday.      Objective:   Last 24 Hour Vital Signs:  BP  Min: 102/59  Max: 133/81  Temp  Av.5 °F (36.4 °C)  Min: 96.4 °F (35.8 °C)  Max: 97.8 °F (36.6 °C)  Pulse  Av.8  Min: 90  Max: 129  Resp  Av.7  Min: 14  Max: 20  SpO2  Av.6 %  Min: 91 %  Max: 99 %  I/O last 3 completed shifts:  In: 777 [P.O.:777]  Out: 3800 [Urine:3800]    Tele showed Afib HR 80-100s    Physical Examination:  Gen: awake, alert, on nasal canula  Head: NCAT   Eyes: PERRL EOMI  CV: irregularly irregular, no murmur appreciated  Pulm: no increase WOB, bilateral crackles, rhonchi, and diffuse auditory wheeze appreciated  ABD: Soft, nontender, nondistended  Ext: no c/c/e  Neuro: non-focal, moves all extremities    Laboratory:  Glucose 170-190    WBC 12  Hg 11.6  Plt 220    INR 1.6    Mag 1.9    Microbiology:  2016 BCx NGTD  2016 UCx NGTD    Other Results:  EKG (my interpretation):   2016 AFRVR  possible LVH    Radiology:  1/3/2016 CXR PA and Lat   No consolidation or pleural effusion    Current Medications:     Infusions:        Scheduled:   albuterol-ipratropium 2.5mg-0.5mg/3mL  3 mL Nebulization Q4H WAKE    azelastine  1 spray Nasal BID    cetirizine  5 mg Oral Daily    clopidogrel  75 mg Oral Daily    cyanocobalamin  100 mcg Oral Daily    diltiaZEM  240 mg Oral Daily    docusate sodium  100 mg Oral BID    doxycycline  100 mg Oral Q12H    ferrous sulfate  325 mg Oral Daily with breakfast    fluticasone  1 spray Each Nare Daily     fluticasone-vilanterol  1 puff Inhalation Daily    lisinopril  20 mg Oral Daily    montelukast  10 mg Oral QHS    oxybutynin  5 mg Oral Daily    pantoprazole  40 mg Oral Daily    predniSONE  50 mg Oral Daily    warfarin  5 mg Oral Every Tues, Thurs, Sat, Sun    warfarin  7.5 mg Oral Every Mon, Wed, Fri        PRN:  albuterol-ipratropium 2.5mg-0.5mg/3mL, benzonatate, dextrose 50%, dextrose 50%, glucagon (human recombinant), glucose, glucose, guaifenesin-codeine 100-10 mg/5 ml, insulin aspart, ondansetron, polyethylene glycol, senna    Assessment:     Adriana Strong is a 72 y.o.woman with acute respiratory failure being treated for acute bronchitis and with atrial fibrillation that is now better rate controlled.    Plan:     Acute Respiratory Failure 2/2 Acute Bronchitis/Asthma  - PCT negative  AP and Lat yesterday without consolidation  deescalated to doxy 100 BID  - still wheezing and SOB  continue O2  scheduled duo-nebs  prednisone 50  montelukast 10  zyrtec 5  azelastine 137mcg nasal spray  fluticasone 50 nasal spray    Paroxysmal Atrial Fibrillation with RVR and Subtherapeutic INR  - RVR at presentation  controlled with increased dose of diltiazem and fluid repletion  cardiac workup negative  - increase home dose from 180 to 240mg with HR 80-100s  - follows with Dr. Carrington, cardiology  CHADS-Vasc 5, on warfarin 5 mg TTSSun, and 7.5 mg MWF   - INR 1.8 today  will get higher dose today  adjust tomorrow if not therapeutic     Chronic Mesenteric Ischemia s/p Stenting  - diagnosed in 2015  continue home plavix  follows with [facility] vascular, Dr. Joana Hernandez     HTN  - controlled on home lisinopril 20  tolerating increasing dilt dose     Pre-Diabetes  - A1c 5.9 in 1/2016  monitor on steroids with SSI    GERD  - continue pantoprazole    Constipation  - restarted on home regimen of PRN colace/miralax last night  no BM since admission\    Insomnia  - likely 2/2 SOB  offered ramelteon for  tonight if needed    PPX  - on warfarin    Dispo  - pending clinical improvement of respiratory symptoms    Cat MD Jim, MPH  [facility], -2  [phone]    [facility] Hospitalist Pager numbers:   [facility] Hospital[facility] Medicine Team A (Rajinder/Isis): [phone]  [facility] Medicine Team B (Antoni/Carleen):  [phone]

## 2017-01-05 LAB
ALBUMIN SERPL BCP-MCNC: 3.2 G/DL
ALP SERPL-CCNC: 38 U/L
ALT SERPL W/O P-5'-P-CCNC: 34 U/L
ANION GAP SERPL CALC-SCNC: 8 MMOL/L
AST SERPL-CCNC: 27 U/L
BASOPHILS # BLD AUTO: 0.01 K/UL
BASOPHILS NFR BLD: 0.1 %
BILIRUB SERPL-MCNC: 0.5 MG/DL
BUN SERPL-MCNC: 13 MG/DL
CALCIUM SERPL-MCNC: 9.1 MG/DL
CHLORIDE SERPL-SCNC: 104 MMOL/L
CO2 SERPL-SCNC: 28 MMOL/L
CREAT SERPL-MCNC: 0.6 MG/DL
DIFFERENTIAL METHOD: ABNORMAL
EOSINOPHIL # BLD AUTO: 0 K/UL
EOSINOPHIL NFR BLD: 0 %
ERYTHROCYTE [DISTWIDTH] IN BLOOD BY AUTOMATED COUNT: 14.4 %
EST. GFR  (AFRICAN AMERICAN): >60 ML/MIN/1.73 M^2
EST. GFR  (NON AFRICAN AMERICAN): >60 ML/MIN/1.73 M^2
GLUCOSE SERPL-MCNC: 95 MG/DL
HCT VFR BLD AUTO: 37.3 %
HGB BLD-MCNC: 12 G/DL
INR PPP: 1.7
LYMPHOCYTES # BLD AUTO: 2.1 K/UL
LYMPHOCYTES NFR BLD: 21.2 %
MAGNESIUM SERPL-MCNC: 1.7 MG/DL
MCH RBC QN AUTO: 31 PG
MCHC RBC AUTO-ENTMCNC: 32.2 %
MCV RBC AUTO: 96 FL
MONOCYTES # BLD AUTO: 0.8 K/UL
MONOCYTES NFR BLD: 8 %
NEUTROPHILS # BLD AUTO: 7.1 K/UL
NEUTROPHILS NFR BLD: 70.6 %
PLATELET # BLD AUTO: 244 K/UL
PMV BLD AUTO: 10.3 FL
POCT GLUCOSE: 143 MG/DL (ref 70–110)
POCT GLUCOSE: 186 MG/DL (ref 70–110)
POCT GLUCOSE: 81 MG/DL (ref 70–110)
POCT GLUCOSE: 88 MG/DL (ref 70–110)
POTASSIUM SERPL-SCNC: 4.1 MMOL/L
PROT SERPL-MCNC: 6.3 G/DL
PROTHROMBIN TIME: 17.5 SEC
RBC # BLD AUTO: 3.87 M/UL
SODIUM SERPL-SCNC: 140 MMOL/L
WBC # BLD AUTO: 9.99 K/UL

## 2017-01-05 PROCEDURE — 94668 MNPJ CHEST WALL SBSQ: CPT

## 2017-01-05 PROCEDURE — 25000003 PHARM REV CODE 250: Performed by: STUDENT IN AN ORGANIZED HEALTH CARE EDUCATION/TRAINING PROGRAM

## 2017-01-05 PROCEDURE — 25000003 PHARM REV CODE 250: Performed by: INTERNAL MEDICINE

## 2017-01-05 PROCEDURE — 25000242 PHARM REV CODE 250 ALT 637 W/ HCPCS: Performed by: HOSPITALIST

## 2017-01-05 PROCEDURE — 11000001 HC ACUTE MED/SURG PRIVATE ROOM

## 2017-01-05 PROCEDURE — 25000003 PHARM REV CODE 250: Performed by: HOSPITALIST

## 2017-01-05 PROCEDURE — 36415 COLL VENOUS BLD VENIPUNCTURE: CPT

## 2017-01-05 PROCEDURE — 80053 COMPREHEN METABOLIC PANEL: CPT

## 2017-01-05 PROCEDURE — 94761 N-INVAS EAR/PLS OXIMETRY MLT: CPT

## 2017-01-05 PROCEDURE — 85610 PROTHROMBIN TIME: CPT

## 2017-01-05 PROCEDURE — 94640 AIRWAY INHALATION TREATMENT: CPT

## 2017-01-05 PROCEDURE — 25000003 PHARM REV CODE 250

## 2017-01-05 PROCEDURE — 85025 COMPLETE CBC W/AUTO DIFF WBC: CPT

## 2017-01-05 PROCEDURE — 83735 ASSAY OF MAGNESIUM: CPT

## 2017-01-05 PROCEDURE — 63600175 PHARM REV CODE 636 W HCPCS

## 2017-01-05 RX ORDER — MAGNESIUM SULFATE HEPTAHYDRATE 40 MG/ML
2 INJECTION, SOLUTION INTRAVENOUS ONCE
Status: COMPLETED | OUTPATIENT
Start: 2017-01-05 | End: 2017-01-05

## 2017-01-05 RX ORDER — DILTIAZEM HYDROCHLORIDE 240 MG/1
240 CAPSULE, COATED, EXTENDED RELEASE ORAL DAILY
Qty: 30 CAPSULE | Refills: 11 | Status: SHIPPED | OUTPATIENT
Start: 2017-01-05 | End: 2017-01-06 | Stop reason: HOSPADM

## 2017-01-05 RX ORDER — CODEINE PHOSPHATE AND GUAIFENESIN 10; 100 MG/5ML; MG/5ML
10 SOLUTION ORAL EVERY 8 HOURS PRN
Qty: 180 ML | Refills: 0 | Status: SHIPPED | OUTPATIENT
Start: 2017-01-05 | End: 2017-01-15

## 2017-01-05 RX ORDER — DOXYCYCLINE HYCLATE 100 MG
100 TABLET ORAL EVERY 12 HOURS
Qty: 5 TABLET | Refills: 0 | Status: SHIPPED | OUTPATIENT
Start: 2017-01-05 | End: 2017-01-06

## 2017-01-05 RX ORDER — DILTIAZEM HYDROCHLORIDE 120 MG/1
360 CAPSULE, COATED, EXTENDED RELEASE ORAL DAILY
Status: DISCONTINUED | OUTPATIENT
Start: 2017-01-06 | End: 2017-01-06 | Stop reason: HOSPADM

## 2017-01-05 RX ORDER — DILTIAZEM HYDROCHLORIDE 30 MG/1
120 TABLET, FILM COATED ORAL ONCE
Status: COMPLETED | OUTPATIENT
Start: 2017-01-05 | End: 2017-01-05

## 2017-01-05 RX ORDER — WARFARIN SODIUM 5 MG/1
TABLET ORAL
Qty: 90 TABLET | Refills: 3 | Status: SHIPPED | OUTPATIENT
Start: 2017-01-05 | End: 2017-01-06 | Stop reason: HOSPADM

## 2017-01-05 RX ORDER — METHYLPREDNISOLONE SOD SUCC 125 MG
125 VIAL (EA) INJECTION EVERY 8 HOURS
Status: DISCONTINUED | OUTPATIENT
Start: 2017-01-05 | End: 2017-01-05

## 2017-01-05 RX ADMIN — PANTOPRAZOLE SODIUM 40 MG: 40 TABLET, DELAYED RELEASE ORAL at 10:01

## 2017-01-05 RX ADMIN — AZELASTINE HYDROCHLORIDE 137 MCG: 137 SPRAY, METERED NASAL at 09:01

## 2017-01-05 RX ADMIN — BENZONATATE 100 MG: 100 CAPSULE ORAL at 10:01

## 2017-01-05 RX ADMIN — AZELASTINE HYDROCHLORIDE 137 MCG: 137 SPRAY, METERED NASAL at 10:01

## 2017-01-05 RX ADMIN — LISINOPRIL 20 MG: 20 TABLET ORAL at 10:01

## 2017-01-05 RX ADMIN — VITAM B12 100 MCG: 100 TAB at 10:01

## 2017-01-05 RX ADMIN — MONTELUKAST SODIUM 10 MG: 10 TABLET, FILM COATED ORAL at 09:01

## 2017-01-05 RX ADMIN — DILTIAZEM HYDROCHLORIDE 120 MG: 30 TABLET, FILM COATED ORAL at 12:01

## 2017-01-05 RX ADMIN — OXYBUTYNIN CHLORIDE 5 MG: 5 TABLET, EXTENDED RELEASE ORAL at 10:01

## 2017-01-05 RX ADMIN — BENZONATATE 100 MG: 100 CAPSULE ORAL at 05:01

## 2017-01-05 RX ADMIN — IPRATROPIUM BROMIDE AND ALBUTEROL SULFATE 3 ML: .5; 3 SOLUTION RESPIRATORY (INHALATION) at 08:01

## 2017-01-05 RX ADMIN — FERROUS SULFATE TAB EC 325 MG (65 MG FE EQUIVALENT) 325 MG: 325 (65 FE) TABLET DELAYED RESPONSE at 10:01

## 2017-01-05 RX ADMIN — DOXYCYCLINE HYCLATE 100 MG: 100 TABLET, COATED ORAL at 10:01

## 2017-01-05 RX ADMIN — MAGNESIUM SULFATE IN WATER 2 G: 40 INJECTION, SOLUTION INTRAVENOUS at 10:01

## 2017-01-05 RX ADMIN — GUAIFENESIN AND CODEINE PHOSPHATE 10 ML: 100; 10 SOLUTION ORAL at 12:01

## 2017-01-05 RX ADMIN — IPRATROPIUM BROMIDE AND ALBUTEROL SULFATE 3 ML: .5; 3 SOLUTION RESPIRATORY (INHALATION) at 03:01

## 2017-01-05 RX ADMIN — PREDNISONE 50 MG: 20 TABLET ORAL at 10:01

## 2017-01-05 RX ADMIN — DOXYCYCLINE HYCLATE 100 MG: 100 TABLET, COATED ORAL at 09:01

## 2017-01-05 RX ADMIN — GUAIFENESIN AND CODEINE PHOSPHATE 10 ML: 100; 10 SOLUTION ORAL at 09:01

## 2017-01-05 RX ADMIN — WARFARIN SODIUM 7.5 MG: 2.5 TABLET ORAL at 05:01

## 2017-01-05 RX ADMIN — DOCUSATE SODIUM 100 MG: 100 CAPSULE, LIQUID FILLED ORAL at 10:01

## 2017-01-05 RX ADMIN — CLOPIDOGREL BISULFATE 75 MG: 75 TABLET ORAL at 10:01

## 2017-01-05 RX ADMIN — DILTIAZEM HYDROCHLORIDE 240 MG: 120 CAPSULE, EXTENDED RELEASE ORAL at 10:01

## 2017-01-05 RX ADMIN — FLUTICASONE PROPIONATE 1 SPRAY: 50 SPRAY, METERED NASAL at 10:01

## 2017-01-05 RX ADMIN — IPRATROPIUM BROMIDE AND ALBUTEROL SULFATE 3 ML: .5; 3 SOLUTION RESPIRATORY (INHALATION) at 11:01

## 2017-01-05 RX ADMIN — CETIRIZINE HYDROCHLORIDE 5 MG: 5 TABLET, FILM COATED ORAL at 10:01

## 2017-01-05 RX ADMIN — DOCUSATE SODIUM 100 MG: 100 CAPSULE, LIQUID FILLED ORAL at 09:01

## 2017-01-05 RX ADMIN — FLUTICASONE FUROATE AND VILANTEROL TRIFENATATE 1 PUFF: 100; 25 POWDER RESPIRATORY (INHALATION) at 08:01

## 2017-01-05 NOTE — PROGRESS NOTES
[facility] Resident HO-2 Progress Note    Subjective:      Ms. Strong reports that she is still WHITLEY but is less SOB and her chest feels less tight. She did not cough as much and was able to sleep the whole night. She is ready to go home today.      Objective:   Last 24 Hour Vital Signs:  BP  Min: 103/59  Max: 150/65  Temp  Av.2 °F (36.8 °C)  Min: 97.6 °F (36.4 °C)  Max: 98.7 °F (37.1 °C)  Pulse  Av.4  Min: 83  Max: 136  Resp  Av.6  Min: 16  Max: 20  SpO2  Av.2 %  Min: 94 %  Max: 99 %  I/O last 3 completed shifts:  In: 1197 [P.O.:1197]  Out: 4000 [Urine:4000]    Physical Examination:  Gen: awake, alert, on nasal canula, sitting up and speaking in full sentences  Head: NCAT   Eyes: PERRL EOMI  CV: irregularly irregular, no murmur appreciated  Pulm: no increase WOB, rales at bases and inspiratory and expiratory wheeze more prominent in upper lung fields  ABD: Soft, nontender, nondistended, normoactive BS  Ext: no c/c/e, in STAN hose  Neuro: non-focal, moves all extremities    Laboratory:  WBC 10  Hg 12  Plt 244    Na 140  K 4.1  Cl 104  Bicarb 28  Glucose 95  BUN 13  Cr 0.6  Ca 9.1  Alb 3.2    Mag 1.7    INR 1.7      Microbiology:  2017 BCx NGTD  2017 UCx NGTD  2017 Respiratory viral panel pending    Other Results:  EKG (my interpretation):   None new    Radiology:  2017 LE U/S pending    2017 TTE    1 - Moderate left atrial enlargement.     2 - Eccentric hypertrophy.     3 - Normal left ventricular systolic function (EF 55-60%).     4 - Normal right ventricular systolic function .     5 - The estimated PA systolic pressure is 34 mmHg.     6 - Mild mitral regurgitation.     7 - Mild tricuspid regurgitation.     8 - Intermediate central venous pressure.     9 - Mild aortic stenosis.  IVC collapses >50% with sniff RA pressure of 8    Current Medications:     Infusions:        Scheduled:   albuterol-ipratropium 2.5mg-0.5mg/3mL  3 mL Nebulization Q4H WAKE    azelastine  1  spray Nasal BID    cetirizine  5 mg Oral Daily    clopidogrel  75 mg Oral Daily    cyanocobalamin  100 mcg Oral Daily    diltiaZEM  240 mg Oral Daily    docusate sodium  100 mg Oral BID    doxycycline  100 mg Oral Q12H    ferrous sulfate  325 mg Oral Daily with breakfast    fluticasone  1 spray Each Nare Daily    fluticasone-vilanterol  1 puff Inhalation Daily    lisinopril  20 mg Oral Daily    montelukast  10 mg Oral QHS    oxybutynin  5 mg Oral Daily    pantoprazole  40 mg Oral Daily    predniSONE  50 mg Oral Daily    warfarin  5 mg Oral Every Tues, Thurs, Sat, Sun    warfarin  7.5 mg Oral Every Mon, Wed, Fri        PRN:  albuterol-ipratropium 2.5mg-0.5mg/3mL, benzonatate, dextrose 50%, dextrose 50%, glucagon (human recombinant), glucose, glucose, guaifenesin-codeine 100-10 mg/5 ml, insulin aspart, ondansetron, polyethylene glycol, ramelteon, senna    Assessment:     Adriana Strong is a 72 y.o.woman with rate controlled Afib and acute respiratory failure being treated for acute bronchitis/asthma improved this morning.    Plan:     Acute Respiratory Failure 2/2 Acute Bronchitis/Asthma  - PCT negative  CXR without opacification  on doxy 100 BID  - still wheezing but improved subjectively and on exam   - continue O2  scheduled duo-nebs  montelukast 10  zyrtec 5  azelastine 137mcg nasal spray  fluticasone 50 nasal spray  prednisone 50  - appreciate pulm recs  patient cannot get solumedrol due to reaction  LE and UE U/S pending  resp viral panel pending  - ECHO without R heart strain or sys/whitmore dysfunction and IVC was collapsible  will not give lasix at this time    Paroxysmal Atrial Fibrillation with RVR and Subtherapeutic INR  - RVR at presentation  controlled with increased dose of diltiazem and fluid repletion  cardiac workup negative  - increase home dose from 180 to 240mg with HR 80-100s  - follows with Dr. Carrington, cardiology  CHADS-Vasc 5, on warfarin 5 mg TTSSun, and 7.5 mg MWF  at home  - INR 1.7 today  will go up to 7.5mg daily     Chronic Mesenteric Ischemia s/p Stenting  - diagnosed in 2015  continue home plavix  follows with [facility] vascular, Dr. Joana Hernandez    H/o Provoked DVT with TIA after LUE PCI  - on plavix and coumadin  no LE swelling  LE U/S pending but ECHO with no R heart strain    Diastolic Heart Failure  - was on lasix remotely but hasn't taken in over a year  no volume overload on exam or on ECHO     HTN  - controlled on home lisinopril 20  tolerating increasing dilt dose     Pre-Diabetes  - A1c 5.9 in 1/2016  monitor on steroids with SSI    GERD  - continue pantoprazole    Constipation  - on home colace/miralax PRN    Insomnia  - ramelteon HS PRN     PPX  - on warfarin and STAN hose    Dispo  - pending reevaluation with staff on rounds    Ramonita Fernandez MD, MPH  [facility], HO-2  [phone]    [facility] Medicine Hospitalist Pager numbers:   [facility] Hospitalist Medicine Team A (Rajinder/Isis): [phone]  [facility] Hospitalist Medicine Team B (Antoni/Carleen):  [phone]

## 2017-01-05 NOTE — PLAN OF CARE
Problem: Patient Care Overview  Goal: Plan of Care Review  Patient on RA, no respiratory distress noted. Will continue to monitor.   Outcome: Ongoing (interventions implemented as appropriate)  Patient stable, plan of care reviewed, verbalized understanding. Patient off floor for Ultrasound, awaiting return to floor. Patient still reports SOB on exertion and cough, prn cough medication given. Patient denies any pain, no acute distress, will continue to monitor.

## 2017-01-05 NOTE — PLAN OF CARE
Telemetry Recap:    Patient stable, atrial fibrillation on heart monitor, HR ranging 80's-120's. Patient HR on ambulation 120-140's, medication adjusted by MD. Nurse to continue to monitor.

## 2017-01-05 NOTE — PROGRESS NOTES
[facility] Resident HO-II Progress Note    Subjective:      Feels like her chest is less tight, but still short of breath with exertion.     Objective:   Last 24 Hour Vital Signs:  BP  Min: 103/59  Max: 150/65  Temp  Av.1 °F (36.7 °C)  Min: 97.6 °F (36.4 °C)  Max: 98.7 °F (37.1 °C)  Pulse  Av.2  Min: 83  Max: 136  Resp  Av.8  Min: 18  Max: 20  SpO2  Av.6 %  Min: 94 %  Max: 98 %  I/O last 3 completed shifts:  In: 656 [P.O.:656]  Out: 4775 [Urine:4775]    Physical Examination:  General: AAOx3, NAD  HEENT: EOMI, PERRL, MMM, oropharynx clear  Neck: no thyromegaly or lymphadenopathy, no elevated JVD appreciated  Respiratory: No use of accessory muscles, diffuse rhonchi and end expiratory wheezes in upper lung fields  Chest wall: No deformities, non-tender  Cardiovascular: Irregularly irregular rhythm, normal rate. No m/r/g. Normal S1 and S2. Radial pulses 2+ bilaterally and synchronous. Capillary refill <3 seconds  Abdominal: soft, NT/ND, normoactive bowel sounds  Extremities: No c/c/e.  Skin: warm and dry. No rashes or lesions.     Laboratory:  Laboratory Data Reviewed: yes  Pertinent Findings:  WBC 9.99  H/H 12/37.3  Platelet 244    Na 140  K 4.1  BUN/Cr 13/0.6    Microbiology Data Reviewed: yes  Pertinent Findings:  Respiratory panel pending  BCx (1/2) - NGTD    Other Results:  Radiology Data Reviewed: yes  Pertinent Findings:  No new data    Current Medications:     Infusions:        Scheduled:   albuterol-ipratropium 2.5mg-0.5mg/3mL  3 mL Nebulization Q4H WAKE    azelastine  1 spray Nasal BID    cetirizine  5 mg Oral Daily    clopidogrel  75 mg Oral Daily    cyanocobalamin  100 mcg Oral Daily    diltiaZEM  240 mg Oral Daily    docusate sodium  100 mg Oral BID    doxycycline  100 mg Oral Q12H    ferrous sulfate  325 mg Oral Daily with breakfast    fluticasone  1 spray Each Nare Daily    fluticasone-vilanterol  1 puff Inhalation Daily    lisinopril  20 mg Oral Daily    magnesium  sulfate IVPB  2 g Intravenous Once    methylPREDNISolone sodium succinate  125 mg Intravenous Q8H    montelukast  10 mg Oral QHS    oxybutynin  5 mg Oral Daily    pantoprazole  40 mg Oral Daily    warfarin  7.5 mg Oral Daily        PRN:  albuterol-ipratropium 2.5mg-0.5mg/3mL, benzonatate, dextrose 50%, dextrose 50%, glucagon (human recombinant), glucose, glucose, guaifenesin-codeine 100-10 mg/5 ml, insulin aspart, ondansetron, polyethylene glycol, ramelteon, senna    Assessment:     Adriana Strong is a 72 y.o.female with asthma, CAD, T2DM, GERD, HTN, chronic mesenteric ischemia, TIA and paroxysmal atrial fibrillation who presented with atrial fibrillation with RVR and continues have shortness of breath despite treatment for COPD/asthma exacerbation       Plan:     Acute hypoxic respiratory failure 2/2 acute asthma exacerbation  - CXR with no infiltrates or consolidation  - Extremity dopplers for DVT eval pending  - Not requiring supplemental oxygen at this time. Continue prednisone, doxycycline and inhalers per primary team  - Though exacerbation likely viral in origin, would recommend V/Q scan to rule out PE as a source of exacerbation given history of clot and negative extremity Dopplers  - Echo with no signs of right heart strain and normal RV and LV EF    Khloe Sanders  [facility] -II  [facility]

## 2017-01-05 NOTE — DISCHARGE SUMMARY
[facility] Internal Medicine Discharge Summary    Primary Team: [facility] Internal Medicine  Attending Physician: Triny Calvillo MD  Resident: Ramonita Fernandez  Intern: Elijah Hernandez    Date of Admit: 1/2/2017  Date of Discharge: 1/6/2017    Discharge to: home  Condition: stable    Discharge Diagnoses     Patient Active Problem List   Diagnosis    Weight loss    Enlarged ovary    Diabetes type 2, controlled    Asthma    HLD (hyperlipidemia)    Chronic mesenteric ischemia    Acute thalamic infarction    Allergy to iodine    Gastroesophageal reflux disease    Essential hypertension    Hyperlipidemia    Chest pain    Shortness of breath    SOB (shortness of breath)    Paroxysmal atrial fibrillation    Warfarin anticoagulation    Old lacunar stroke without late effect    Atrial fibrillation with RVR    COPD exacerbation    Acute respiratory failure    Acute bronchitis    Constipation       Consultants and Procedures     Consultants:  Pulm    Procedures:   None    Brief History of Present Illness      Adriana Strong is a 72 y.o. female who  has a past medical history of Anticoagulant long-term use; Anxiety; Arthritis; Asthma; Cataracts, bilateral; Chest pain, atypical; Colon polyp; Coronary artery disease; Diabetes mellitus; Dry eyes; Esophageal erosions; GERD (gastroesophageal reflux disease); Heart murmur; Hemorrhoid; High cholesterol; Hypertension; Irritable bowel syndrome; Shingles (2015); TIA (transient ischemic attack); and Use of cane as ambulatory aid.. The patient presented to [facility] on 1/2/2017 with a primary complaint of Shortness of Breath (was sent to ER by [facility]. Was diagnosed with pneumonia on Friday. Patient complaints od increase shortness of breath and tachycardic.)     The patient was in their usual state of health until 1.5 weeks ago when she began to have post-nasal drip and congestion with a non-productive cough. 3 days ago she went to her PCP Dr. Mcgraw's  office and was given azithromycin for pneumonia seen on CXR. Reports chills but no fever. Has been taking the azithromycin without improvement. States she went to urgent care today because she was coughing so much she could not catch her breath this AM. Was sent from urgent care to ED. Found to be in a-fib with RVR. Reports her  recently had a sinus infection. Has not been eating or drinking much. States she took her medications this AM.     Hospital Course By Problem with Pertinent Findings     Acute Respiratory Failure 2/2 Acute Bronchitis/Asthma  - PCT negative  CXR without opacification  she was started on empiric antibiotics but deescalated to doxy 100 BID and discharged with 3 more days for a total of 1 week along with one more day of prednisone. She was treated with O2  scheduled duo-nebs  montelukast 10  zyrtec 5  azelastine 137mcg nasal spray  fluticasone 50 nasal spray  prednisone 50. Pulmonology was consulted. She noted a reaction to solumedrol at the IV site after first dose in the ED so she was continued on oral. A PE workup was negative with low prob VQ scan, negative upper and lower extremity ultrasounds. ECHO did not show any overload or right heart strain. She improved symptomatically after 4 days and would likely benefit from repeat PFTs as an outpatient with her doctor, Dr. Zelalem Grant, whom she will follow up with at discharge. She was also given codeine cough syrup and an increased dose of advair 250 from 1 puff to 2 for a total dose of 500. Additionally, she was instructed to schedule her nebs every 4 hours for the next 3 days and then resume PRN thereafter.       Paroxysmal Atrial Fibrillation with RVR and Subtherapeutic INR  - RVR at presentation  controlled with increased dose of diltiazem and fluid repletion  cardiac workup negative  for better control she was put on increased doses of the XR diltiazem up to 360mg by the last day to get better rate control and upon  discharge, her home dose of XR diltiazem 180 daily was increased to CD diltiazem 180 BID. She will follow up with Dr. Carrington, cardiology. Her INR was also subtherapeutic and her home dose of alternating 7.5 to 5mg was increased to 7.5mg daily. She will need an INR check this week with Dr. Carrington as well.       Chronic Mesenteric Ischemia s/p Stenting  - diagnosed in 2015  continued home plavix  will follow up with her vascular surgery doctor at [facility], Dr. Joana Hernandez      H/o Provoked DVT with TIA after LUE PCI  - on plavix and coumadin  ECHO with no R heart strain and VQ scan was low probability with negative with negative upper and lower extremity Ultrasounds      Diastolic Heart Failure  - was on lasix remotely but hasn't taken in over a year  no volume overload on exam or on ECHO      HTN  - controlled on home lisinopril 20 and tolerated increased dose of diltiazem      Pre-Diabetes  - A1c 5.9 in 1/2016  monitored on steroids but did not require any sliding scale insulin    Discharge Medications        Medication List      START taking these medications          diltiaZEM 60 MG Cp12   Commonly known as:  CARDIZEM SR   Take 3 capsules (180 mg total) by mouth 2 (two) times daily.   Replaces:  diltiaZEM 180 MG Cdcr       doxycycline 100 MG tablet   Commonly known as:  VIBRA-TABS   Take 1 tablet (100 mg total) by mouth every 12 (twelve) hours.       guaifenesin-codeine 100-10 mg/5 ml  mg/5 mL syrup   Commonly known as:  TUSSI-ORGANIDIN NR   Take 10 mLs by mouth every 8 (eight) hours as needed for Cough or Congestion (cough).         CHANGE how you take these medications          fluticasone-salmeterol 250-50 mcg/dose 250-50 mcg/dose diskus inhaler   Commonly known as:  ADVAIR DISKUS   Inhale 2 puffs into the lungs 2 (two) times daily.   What changed:  how much to take       predniSONE 10 MG tablet   Commonly known as:  DELTASONE   Take 5 tablets (50 mg total) by mouth once daily.   What changed:   See the new instructions.       * PROAIR HFA 90 mcg/actuation inhaler   Generic drug:  albuterol   What changed:    - Another medication with the same name was changed. Make sure you understand how and when to take each.  - Another medication with the same name was removed. Continue taking this medication, and follow the directions you see here.       * albuterol 1.25 mg/3 mL Nebu   Commonly known as:  ACCUNEB   Take scheduled q4 for the next two days while at home then resume prn dosing   What changed:    - how much to take  - how to take this  - when to take this  - reasons to take this  - additional instructions  - Another medication with the same name was removed. Continue taking this medication, and follow the directions you see here.       warfarin 7.5 MG tablet   Commonly known as:  COUMADIN   Take 1 tablet (7.5 mg total) by mouth Daily.   What changed:    - medication strength  - how much to take  - how to take this  - when to take this  - additional instructions       * Notice:  This list has 2 medication(s) that are the same as other medications prescribed for you. Read the directions carefully, and ask your doctor or other care provider to review them with you.      CONTINUE taking these medications          azelastine 137 mcg (0.1 %) nasal spray   Commonly known as:  ASTELIN       calcium carbonate 600 mg (1,500 mg) Tab   Commonly known as:  OS-MICHAEL       clopidogrel 75 mg tablet   Commonly known as:  PLAVIX   Take 1 tablet (75 mg total) by mouth once daily.       ferrous sulfate 325 mg (65 mg iron) Tab tablet       fish oil-omega-3 fatty acids 300-1,000 mg capsule       fluticasone 50 mcg/actuation nasal spray   Commonly known as:  FLONASE       FLUZONE HIGH-DOSE 2016-17 (PF) 180 mcg/0.5 mL Syrg   Generic drug:  flu vacc [id number] 65yr up(PF)       lisinopril 20 MG tablet   Commonly known as:  PRINIVIL,ZESTRIL   Take 1 tablet (20 mg total) by mouth once daily.       lorazepam 0.5 MG tablet   Commonly  known as:  ATIVAN       magnesium 250 mg Tab       montelukast 10 mg tablet   Commonly known as:  SINGULAIR       oxybutynin 5 MG Tr24   Commonly known as:  DITROPAN-XL       pantoprazole 40 MG tablet   Commonly known as:  PROTONIX   Take 1 tablet (40 mg total) by mouth once daily.       polyethylene glycol 17 gram Pwpk   Commonly known as:  GLYCOLAX       psyllium powder   Commonly known as:  METAMUCIL       REFRESH DRY EYE THERAPY OPHT       TYLENOL ARTHRITIS 650 MG Tbsr   Generic drug:  acetaminophen       VITAMIN B-12 1000 MCG tablet   Generic drug:  cyanocobalamin       VITAMIN C 500 MG tablet   Generic drug:  ascorbic acid (vitamin C)         STOP taking these medications          diltiaZEM 180 MG Cdcr   Commonly known as:  DILACOR XR   Replaced by:  diltiaZEM 60 MG Cp12            Where to Get Your Medications      You can get these medications from any pharmacy     Bring a paper prescription for each of these medications     albuterol 1.25 mg/3 mL Nebu    diltiaZEM 60 MG Cp12    doxycycline 100 MG tablet    fluticasone-salmeterol 250-50 mcg/dose 250-50 mcg/dose diskus inhaler    guaifenesin-codeine 100-10 mg/5 ml  mg/5 mL syrup    predniSONE 10 MG tablet    warfarin 7.5 MG tablet             Discharge Information:   Diet:  Coumadin heart healthy diabetic     Physical Activity:  As tolerated    Instructions:  1. Take all medications as prescribed  2. Keep all follow-up appointments  3. Return to the hospital or call your primary care physicians if any worsening symptoms such as shortness of breath, fever, uncontrolled pain, or worsening cough occur.    Follow-Up Appointments:  Dr. Mcgraw, PCP on 1/10/2017 at 8:40  Dr. Carrington, cardiology on 1/13/2017 at 12:30 with INR check prior  Dr. Grant, pulmonology on 1/16/2017 at 11:00   Dr. Bernard, vascular surgery on 1/19/2016 at 8:30AM for labs and then 9:05 for appointment      Ramonita Fernandez MD, MPH  [facility] Internal Medicine, -2  [facility] Internal Medicine  Team A  [phone]

## 2017-01-05 NOTE — PLAN OF CARE
"MD team notified patient refusing IV steroid since arm was "red and broke out in hives" in the ED when medication was administered. MD team stated they would come and speak with patient, nurse to continue to monitor.  "

## 2017-01-05 NOTE — PLAN OF CARE
Problem: Patient Care Overview  Goal: Plan of Care Review  Patient on RA, no respiratory distress noted. Will continue to monitor.   Outcome: Ongoing (interventions implemented as appropriate)  Plan of care reviewed with patient. Patient verbalized complete understanding. Fall precautions maintained. Bed in lowest position, locked, call light within reach and bed alarm set to level 1. Pt aware . Side rails up x's 2 with slip resistant socks on. Nurse instructed patient to notify staff for any assistance and pt verbalized complete understanding.      Patient on telemetry throughout shift with no ectopy noted. Will continue to monitor.

## 2017-01-05 NOTE — PLAN OF CARE
Problem: Patient Care Overview  Goal: Plan of Care Review  Patient on RA, no respiratory distress noted. Will continue to monitor.   Outcome: Ongoing (interventions implemented as appropriate)  Patient given aero treatment with no adverse reactions noted.  Patient tolerated treatment well.

## 2017-01-06 VITALS
DIASTOLIC BLOOD PRESSURE: 85 MMHG | TEMPERATURE: 99 F | SYSTOLIC BLOOD PRESSURE: 139 MMHG | BODY MASS INDEX: 37.45 KG/M2 | HEART RATE: 96 BPM | WEIGHT: 219.38 LBS | OXYGEN SATURATION: 98 % | HEIGHT: 64 IN | RESPIRATION RATE: 18 BRPM

## 2017-01-06 LAB
ALBUMIN SERPL BCP-MCNC: 3 G/DL
ALP SERPL-CCNC: 41 U/L
ALT SERPL W/O P-5'-P-CCNC: 37 U/L
ANION GAP SERPL CALC-SCNC: 7 MMOL/L
AST SERPL-CCNC: 22 U/L
BASOPHILS # BLD AUTO: 0.01 K/UL
BASOPHILS NFR BLD: 0.1 %
BILIRUB SERPL-MCNC: 0.4 MG/DL
BUN SERPL-MCNC: 16 MG/DL
CALCIUM SERPL-MCNC: 9 MG/DL
CHLORIDE SERPL-SCNC: 103 MMOL/L
CO2 SERPL-SCNC: 29 MMOL/L
CREAT SERPL-MCNC: 0.6 MG/DL
DIFFERENTIAL METHOD: ABNORMAL
EOSINOPHIL # BLD AUTO: 0 K/UL
EOSINOPHIL NFR BLD: 0 %
ERYTHROCYTE [DISTWIDTH] IN BLOOD BY AUTOMATED COUNT: 14.1 %
EST. GFR  (AFRICAN AMERICAN): >60 ML/MIN/1.73 M^2
EST. GFR  (NON AFRICAN AMERICAN): >60 ML/MIN/1.73 M^2
GLUCOSE SERPL-MCNC: 104 MG/DL
HCT VFR BLD AUTO: 36 %
HGB BLD-MCNC: 11.8 G/DL
INR PPP: 2
LYMPHOCYTES # BLD AUTO: 2.4 K/UL
LYMPHOCYTES NFR BLD: 29.2 %
MAGNESIUM SERPL-MCNC: 1.9 MG/DL
MCH RBC QN AUTO: 31.5 PG
MCHC RBC AUTO-ENTMCNC: 32.8 %
MCV RBC AUTO: 96 FL
MONOCYTES # BLD AUTO: 0.8 K/UL
MONOCYTES NFR BLD: 9.6 %
NEUTROPHILS # BLD AUTO: 5 K/UL
NEUTROPHILS NFR BLD: 60.7 %
PLATELET # BLD AUTO: 249 K/UL
PMV BLD AUTO: 9.9 FL
POCT GLUCOSE: 147 MG/DL (ref 70–110)
POCT GLUCOSE: 87 MG/DL (ref 70–110)
POTASSIUM SERPL-SCNC: 4.2 MMOL/L
PROT SERPL-MCNC: 6.1 G/DL
PROTHROMBIN TIME: 20.6 SEC
RBC # BLD AUTO: 3.75 M/UL
SODIUM SERPL-SCNC: 139 MMOL/L
WBC # BLD AUTO: 8.16 K/UL

## 2017-01-06 PROCEDURE — 94640 AIRWAY INHALATION TREATMENT: CPT

## 2017-01-06 PROCEDURE — 25000003 PHARM REV CODE 250: Performed by: STUDENT IN AN ORGANIZED HEALTH CARE EDUCATION/TRAINING PROGRAM

## 2017-01-06 PROCEDURE — 25000003 PHARM REV CODE 250

## 2017-01-06 PROCEDURE — 63600175 PHARM REV CODE 636 W HCPCS

## 2017-01-06 PROCEDURE — 25000242 PHARM REV CODE 250 ALT 637 W/ HCPCS: Performed by: HOSPITALIST

## 2017-01-06 PROCEDURE — 36415 COLL VENOUS BLD VENIPUNCTURE: CPT

## 2017-01-06 PROCEDURE — 85025 COMPLETE CBC W/AUTO DIFF WBC: CPT

## 2017-01-06 PROCEDURE — 80053 COMPREHEN METABOLIC PANEL: CPT

## 2017-01-06 PROCEDURE — 85610 PROTHROMBIN TIME: CPT

## 2017-01-06 PROCEDURE — 25000003 PHARM REV CODE 250: Performed by: HOSPITALIST

## 2017-01-06 PROCEDURE — 94761 N-INVAS EAR/PLS OXIMETRY MLT: CPT

## 2017-01-06 PROCEDURE — 83735 ASSAY OF MAGNESIUM: CPT

## 2017-01-06 PROCEDURE — 25000003 PHARM REV CODE 250: Performed by: INTERNAL MEDICINE

## 2017-01-06 PROCEDURE — 94668 MNPJ CHEST WALL SBSQ: CPT

## 2017-01-06 RX ORDER — FLUTICASONE PROPIONATE AND SALMETEROL 250; 50 UG/1; UG/1
2 POWDER RESPIRATORY (INHALATION) 2 TIMES DAILY
Qty: 14 EACH | Refills: 6 | Status: SHIPPED | OUTPATIENT
Start: 2017-01-06 | End: 2017-06-06 | Stop reason: ALTCHOICE

## 2017-01-06 RX ORDER — DILTIAZEM HYDROCHLORIDE 120 MG/1
120 CAPSULE, EXTENDED RELEASE ORAL 2 TIMES DAILY
Qty: 60 CAPSULE | Refills: 11 | Status: SHIPPED | OUTPATIENT
Start: 2017-01-06 | End: 2017-01-06 | Stop reason: HOSPADM

## 2017-01-06 RX ORDER — WARFARIN 7.5 MG/1
7.5 TABLET ORAL DAILY
Qty: 30 TABLET | Refills: 11 | Status: SHIPPED | OUTPATIENT
Start: 2017-01-06 | End: 2017-01-10 | Stop reason: SDUPTHER

## 2017-01-06 RX ORDER — ALBUTEROL SULFATE 1.25 MG/3ML
SOLUTION RESPIRATORY (INHALATION)
Qty: 3 ML | Refills: 1 | Status: SHIPPED | OUTPATIENT
Start: 2017-01-06 | End: 2020-11-09

## 2017-01-06 RX ORDER — DILTIAZEM HYDROCHLORIDE 60 MG/1
180 CAPSULE, EXTENDED RELEASE ORAL 2 TIMES DAILY
Qty: 180 CAPSULE | Refills: 11 | Status: SHIPPED | OUTPATIENT
Start: 2017-01-06 | End: 2018-01-02 | Stop reason: SDUPTHER

## 2017-01-06 RX ORDER — PREDNISONE 10 MG/1
50 TABLET ORAL DAILY
Qty: 50 TABLET | Refills: 0 | Status: SHIPPED | OUTPATIENT
Start: 2017-01-06 | End: 2017-01-06

## 2017-01-06 RX ORDER — DOXYCYCLINE HYCLATE 100 MG
100 TABLET ORAL EVERY 12 HOURS
Qty: 5 TABLET | Refills: 0 | Status: SHIPPED | OUTPATIENT
Start: 2017-01-06 | End: 2017-06-01 | Stop reason: CLARIF

## 2017-01-06 RX ORDER — PREDNISONE 10 MG/1
50 TABLET ORAL DAILY
Qty: 1 TABLET | Refills: 0 | Status: SHIPPED | OUTPATIENT
Start: 2017-01-06 | End: 2017-01-13

## 2017-01-06 RX ADMIN — PANTOPRAZOLE SODIUM 40 MG: 40 TABLET, DELAYED RELEASE ORAL at 08:01

## 2017-01-06 RX ADMIN — CETIRIZINE HYDROCHLORIDE 5 MG: 5 TABLET, FILM COATED ORAL at 08:01

## 2017-01-06 RX ADMIN — PREDNISONE 50 MG: 20 TABLET ORAL at 08:01

## 2017-01-06 RX ADMIN — DOCUSATE SODIUM 100 MG: 100 CAPSULE, LIQUID FILLED ORAL at 08:01

## 2017-01-06 RX ADMIN — IPRATROPIUM BROMIDE AND ALBUTEROL SULFATE 3 ML: .5; 3 SOLUTION RESPIRATORY (INHALATION) at 01:01

## 2017-01-06 RX ADMIN — DILTIAZEM HYDROCHLORIDE 360 MG: 120 CAPSULE, EXTENDED RELEASE ORAL at 08:01

## 2017-01-06 RX ADMIN — FLUTICASONE FUROATE AND VILANTEROL TRIFENATATE 1 PUFF: 100; 25 POWDER RESPIRATORY (INHALATION) at 07:01

## 2017-01-06 RX ADMIN — VITAM B12 100 MCG: 100 TAB at 08:01

## 2017-01-06 RX ADMIN — DOXYCYCLINE HYCLATE 100 MG: 100 TABLET, COATED ORAL at 08:01

## 2017-01-06 RX ADMIN — LISINOPRIL 20 MG: 20 TABLET ORAL at 08:01

## 2017-01-06 RX ADMIN — IPRATROPIUM BROMIDE AND ALBUTEROL SULFATE 3 ML: .5; 3 SOLUTION RESPIRATORY (INHALATION) at 07:01

## 2017-01-06 RX ADMIN — FERROUS SULFATE TAB EC 325 MG (65 MG FE EQUIVALENT) 325 MG: 325 (65 FE) TABLET DELAYED RESPONSE at 08:01

## 2017-01-06 RX ADMIN — FLUTICASONE PROPIONATE 1 SPRAY: 50 SPRAY, METERED NASAL at 08:01

## 2017-01-06 RX ADMIN — CLOPIDOGREL BISULFATE 75 MG: 75 TABLET ORAL at 08:01

## 2017-01-06 RX ADMIN — GUAIFENESIN AND CODEINE PHOSPHATE 10 ML: 100; 10 SOLUTION ORAL at 05:01

## 2017-01-06 RX ADMIN — OXYBUTYNIN CHLORIDE 5 MG: 5 TABLET, EXTENDED RELEASE ORAL at 08:01

## 2017-01-06 RX ADMIN — AZELASTINE HYDROCHLORIDE 137 MCG: 137 SPRAY, METERED NASAL at 08:01

## 2017-01-06 NOTE — PLAN OF CARE
Telemetry Recap:    Patient stable, afib on heart monitor, no red alarms on monitor, will continue to monitor.

## 2017-01-06 NOTE — PLAN OF CARE
Problem: Patient Care Overview  Goal: Plan of Care Review  Patient on RA, no respiratory distress noted. Will continue to monitor.   Outcome: Ongoing (interventions implemented as appropriate)  Patient on RA, no respiratory distress noted. Will continue to monitor.

## 2017-01-06 NOTE — PROGRESS NOTES
[facility] Pulmonology Resident HO-II Progress Note    Subjective:      Says she feels like her breathing is much better this morning. Was able to walk around the floor a little bit and tolerated well. Denies chest pain, fever/chills, abdominal pain.     Objective:   Last 24 Hour Vital Signs:  BP  Min: 108/63  Max: 145/73  Temp  Av.3 °F (36.8 °C)  Min: 97.8 °F (36.6 °C)  Max: 99 °F (37.2 °C)  Pulse  Av.4  Min: 79  Max: 126  Resp  Av.4  Min: 16  Max: 20  SpO2  Av.9 %  Min: 93 %  Max: 99 %  I/O last 3 completed shifts:  In: 1242 [P.O.:1192; I.V.:50]  Out: 4525 [Urine:4525]    Physical Examination:  General: AAOx3, NAD  HEENT: EOMI, PERRL, MMM, oropharynx clear  Neck: no thyromegaly or lymphadenopathy, no elevated JVD appreciated  Respiratory: No use of accessory muscles, diffuse rhonchi and end expiratory wheezes in upper lung fields  Chest wall: No deformities, non-tender  Cardiovascular: Irregularly irregular rhythm, normal rate. No m/r/g. Normal S1 and S2. Radial pulses 2+ bilaterally and synchronous. Capillary refill <3 seconds  Abdominal: soft, NT/ND, normoactive bowel sounds  Extremities: No c/c/e.  Skin: warm and dry. No rashes. Multiple ecchymoses over upper extremities    Laboratory:  Laboratory Data Reviewed: yes  Pertinent Findings:  WBC 8.16  H/H 11.8/36  Platelet 249    PT/INR 20.6/2    Microbiology Data Reviewed: yes  Pertinent Findings:  BCx (1/2) - NGTD    Other Results:    Radiology Data Reviewed: yes  Pertinent Findings:  Imaging Results         NM Lung Ventilation Perfusion Imaging (In process)         US Upper Extremity Veins Bilateral (Final result) Result time:  17 14:14:36    Final result by Mahendra Zapata MD (17 14:14:36)    Impression:         No evidence of deep venous thrombosis in either upper extremity.        Electronically signed by: MAHENDRA ZAPATA MD  Date:     17  Time:    14:14     Narrative:    BILATERAL UPPER EXTREMITY VENOUS DUPLEX ULTRASOUND.      Technique: The bilateral upper extremity deep venous system was imaged by ultrasound.  The internal jugular, subclavian, axillary, brachial, basilic, and cephalic veins were analyzed with transducer compression, color doppler, and venous waveform analysis.        Comparison: None.    FINDINGS:    Right upper extremity:  The internal jugular, subclavian, axillary, brachial, basilic, and cephalic veins exhibit spontaneous flow which varies with respiration. The axillary, basilic, brachial, and cephalic vessels are normally compressible. There is no evidence of deep venous thrombus.  The internal jugular waveforms are bilaterally symmetric.     Left upper extremity:  The internal jugular, subclavian, axillary, brachial, basilic, and cephalic veins exhibit spontaneous flow which varies with respiration. The axillary, basilic, brachial, and cephalic vessels are normally compressible. There is no evidence of deep venous thrombus.            US Lower Extremity Veins Bilateral (Final result) Result time:  01/05/17 10:01:16    Final result by Nova Santizo MD (01/05/17 10:01:16)    Impression:      No evidence of acute venous thrombosis of the right and left lower extremity.      Electronically signed by: NOVA SANTIZO  Date:     01/05/17  Time:    10:01     Narrative:    HISTORY:  rule out DVT    TECHNIQUE: Duplex and color flow Doppler evaluation of the right and left lower extremity.    COMPARISON: None    FINDINGS:    Right lower extremity has  no evidence of acute venous thrombosis in the common femoral, superficial femoral, greater saphenous, popliteal, peroneal, anterior tibial, and posterior tibial vein(s).     Left lower extremity has  no evidence of acute venous thrombosis in the common femoral, superficial femoral, greater saphenous, popliteal, peroneal, anterior tibial, and posterior tibial vein(s).             Current Medications:     Infusions:        Scheduled:   albuterol-ipratropium 2.5mg-0.5mg/3mL  3 mL  Nebulization Q4H WAKE    azelastine  1 spray Nasal BID    cetirizine  5 mg Oral Daily    clopidogrel  75 mg Oral Daily    cyanocobalamin  100 mcg Oral Daily    diltiaZEM  360 mg Oral Daily    docusate sodium  100 mg Oral BID    doxycycline  100 mg Oral Q12H    ferrous sulfate  325 mg Oral Daily with breakfast    fluticasone  1 spray Each Nare Daily    fluticasone-vilanterol  1 puff Inhalation Daily    lisinopril  20 mg Oral Daily    montelukast  10 mg Oral QHS    oxybutynin  5 mg Oral Daily    pantoprazole  40 mg Oral Daily    predniSONE  50 mg Oral Daily    warfarin  7.5 mg Oral Daily        PRN:  albuterol-ipratropium 2.5mg-0.5mg/3mL, benzonatate, dextrose 50%, dextrose 50%, glucagon (human recombinant), glucose, glucose, guaifenesin-codeine 100-10 mg/5 ml, insulin aspart, ondansetron, polyethylene glycol, ramelteon, senna      Assessment:     Adriana Strong is a 72 y.o.female with asthma (vs COPD from secondhand smoke), CAD, T2DM, GERD, HTN, chronic mesenteric ischemia, TIA and paroxysmal atrial fibrillation who presented with atrial fibrillation with RVR and asthma vs COPD exacerbation       Plan:     Acute hypoxic respiratory failure (resolved) 2/2 asthma vs COPD exacerbations  - Lung pathology unclear, will need outpatient PFTs  - Extremity Dopplers negative for DVT, pending V/Q this AM as some patients can have PE as a provoking factor for exacerbation  - If V/Q negative, this is likely to be a viral bronchitis picture and would recommend to treat as such    Khloe Sanders  [facility] Internal Medicine -II  U Pulmonology Service

## 2017-01-06 NOTE — PLAN OF CARE
Problem: Patient Care Overview  Goal: Plan of Care Review  Patient on RA, no respiratory distress noted. Will continue to monitor.   Outcome: Ongoing (interventions implemented as appropriate)  Patient remains on tele running NSR in the 80s. Patient verbalizes fall precautions, bed in the low lying position, wheels locked, call light within reach. Will continue to monitor, report given to SILVINO Prabhakar.

## 2017-01-06 NOTE — PROGRESS NOTES
[facility] IM Resident HOAmyI Progress Note    Subjective:      Pt. Reports that she feels somewhat better, and she was able to sleep the whole night. She has been able to cough up some sputum which has helped with her breathing. She was also able to tolerate walking down the fuchs yesterday. She states that she thinks she can go home today.     Objective:   Last 24 Hour Vital Signs:  BP  Min: 108/63  Max: 145/73  Temp  Av.3 °F (36.8 °C)  Min: 97.8 °F (36.6 °C)  Max: 99 °F (37.2 °C)  Pulse  Av.2  Min: 79  Max: 126  Resp  Av.5  Min: 16  Max: 20  SpO2  Av.1 %  Min: 93 %  Max: 97 %  I/O last 3 completed shifts:  In: 1242 [P.O.:1192; I.V.:50]  Out: 4525 [Urine:4525]    Physical Examination:  Gen: awake, alert, on nasal canula, sitting up and speaking in full sentences  Head: NCAT   Eyes: PERRL EOMI  CV: irregularly irregular, no murmur appreciated  Pulm: Lungs clear with end expiratory wheezing heard diffusely  ABD: Soft, nontender, nondistended, normoactive BS  Ext: no c/c/e, in STAN hose  Neuro: non-focal, moves all extremities    Laboratory:  Laboratory Data Reviewed: yes  CBC:   Recent Labs  Lab 17  0301   WBC 8.16   RBC 3.75*   HGB 11.8*   HCT 36.0*      MCV 96   MCH 31.5*   MCHC 32.8     CMP:   Recent Labs  Lab 17  0301      CALCIUM 9.0   ALBUMIN 3.0*   PROT 6.1      K 4.2   CO2 29      BUN 16   CREATININE 0.6   ALKPHOS 41*   ALT 37   AST 22   BILITOT 0.4       Current Medications:     Infusions:        Scheduled:   albuterol-ipratropium 2.5mg-0.5mg/3mL  3 mL Nebulization Q4H WAKE    azelastine  1 spray Nasal BID    cetirizine  5 mg Oral Daily    clopidogrel  75 mg Oral Daily    cyanocobalamin  100 mcg Oral Daily    diltiaZEM  360 mg Oral Daily    docusate sodium  100 mg Oral BID    doxycycline  100 mg Oral Q12H    ferrous sulfate  325 mg Oral Daily with breakfast    fluticasone  1 spray Each Nare Daily    fluticasone-vilanterol  1 puff Inhalation Daily     lisinopril  20 mg Oral Daily    montelukast  10 mg Oral QHS    oxybutynin  5 mg Oral Daily    pantoprazole  40 mg Oral Daily    predniSONE  50 mg Oral Daily    warfarin  7.5 mg Oral Daily        PRN:  albuterol-ipratropium 2.5mg-0.5mg/3mL, benzonatate, dextrose 50%, dextrose 50%, glucagon (human recombinant), glucose, glucose, guaifenesin-codeine 100-10 mg/5 ml, insulin aspart, ondansetron, polyethylene glycol, ramelteon, senna    Assessment:     Adriana Strong is a 72 y.o.female with  Patient Active Problem List    Diagnosis Date Noted    Constipation 01/04/2017    Acute respiratory failure 01/03/2017    Acute bronchitis 01/03/2017    Atrial fibrillation with RVR 01/02/2017    COPD exacerbation 01/02/2017    Old lacunar stroke without late effect 12/09/2016    Warfarin anticoagulation 07/22/2016    Paroxysmal atrial fibrillation 07/08/2016    Shortness of breath 07/01/2016    SOB (shortness of breath) 07/01/2016    Hyperlipidemia 06/17/2016    Chest pain 06/17/2016    Essential hypertension 04/08/2016    Gastroesophageal reflux disease     Acute thalamic infarction 05/08/2015    Allergy to iodine 05/08/2015    Chronic mesenteric ischemia 04/16/2015    Weight loss 11/24/2014    Enlarged ovary 11/24/2014    Diabetes type 2, controlled 11/24/2014    Asthma 11/24/2014    HLD (hyperlipidemia) 11/24/2014        Plan:   Acute Respiratory Failure 2/2 Acute Bronchitis/Asthma  - PCT negative  CXR without opacification  on doxy 100 BID  - still wheezing on expiration but improved subjectively and on exam   - continue O2  scheduled duo-nebs  montelukast 10  zyrtec 5  azelastine 137mcg nasal spray  fluticasone 50 nasal spray  prednisone 50  - appreciate pulm recs  patient cannot get solumedrol due to reaction continue PO steroids  LE and UE U/S negative for evidence of DVT   resp viral panel negative  - ECHO without R heart strain or sys/whitmore dysfunction and IVC was collapsible  not  concered for HF exacerbation; will not give lasix at this time     Paroxysmal Atrial Fibrillation with RVR and Subtherapeutic INR  - RVR at presentation  controlled with increased dose of diltiazem and fluid repletion  cardiac workup negative  - increase home dose from 180 to 240mg with HR 80-100s  - follows with Dr. Carrington, cardiology  CHADS-Vasc 5, on warfarin 5 mg TTSSun, and 7.5 mg MWF at home  - INR 1.7 1/6  will go up to 7.5mg daily  -HR still elevated in 90s-130s, increased diltiazem dose again up to 240 mg      Chronic Mesenteric Ischemia s/p Stenting  - diagnosed in 2015  continued home plavix  follows with [facility] vascular, Dr. Joana Hernandez     H/o Provoked DVT with TIA after LUE PCI  - on plavix and coumadin  no LE swelling UE & LE U/S without evidence of DVT; ECHO with no R heart strain     Diastolic Heart Failure  - was on lasix remotely but hasn't taken in over a year  no volume overload on exam or on ECHO      HTN  - controlled on home lisinopril 20  tolerating increasing dilt dose      Pre-Diabetes  - A1c 5.9 in 1/2016  monitor on steroids with SSI     GERD  - continue pantoprazole     Constipation  - on home colace/miralax PRN     Insomnia  - ramelteon HS PRN      PPX  - on warfarin and STAN hose     Dispo  - pending result of V/Q scan, pt. Likely ready for D/C today if low probability for PE     Elijah Hernandez  \[facility]\"" Internal Medicine HO-I  U IM Service Team A    \[facility]\"" Medicine Hospitalist Pager numbers:   U Hospitalist Medicine Team A (Rajinder/Isis): [phone]  \[facility]\"" Hospitalist Medicine Team B (Antoni/Carleen):  [phone]

## 2017-01-06 NOTE — PLAN OF CARE
01/06/17 1406   Final Note   Assessment Type Final Discharge Note   Discharge Disposition Home   What phone number can be called within the next 1-3 days to see how you are doing after discharge? [phone]   Hospital Follow Up  Appt(s) scheduled? Yes   Offered [facility] -- Bedside Delivery? Yes   Discharge/Hospital Encounter Summary to (non[facility]) PCP n/a   Referral to Outpatient Case Management complete? n/a   Referral to / orders for Home Health Complete? n/a   30 day supply of medicines given at discharge, if documented non-compliance / non-adherence? n/a   Any social issues identified prior to discharge? No   Did you assess the readiness or willingness of the family or caregiver to support self management of care? Yes   Right Care Referral Info   Post Acute Recommendation No Care

## 2017-01-06 NOTE — PLAN OF CARE
Patient stable, telemetry discontinued per MD order, iv discontinued tip intact. Patient's discharge instructions given, patient and  verbalized understanding, med rec reviewed. Nurse educated patient on new medication and side effects, atrial fibrillation, and when to notify MD. Refer to clinical references for further education.

## 2017-01-07 LAB
BACTERIA BLD CULT: NORMAL
BACTERIA BLD CULT: NORMAL

## 2017-01-09 ENCOUNTER — PATIENT OUTREACH (OUTPATIENT)
Dept: ADMINISTRATIVE | Facility: CLINIC | Age: 72
End: 2017-01-09
Payer: MEDICARE

## 2017-01-09 NOTE — PATIENT INSTRUCTIONS
Discharge Instructions for Atrial Fibrillation  You have been diagnosed with an abnormal heart rhythm called atrial fibrillation. With this condition, your hearts 2 upper chambers quiver rather than squeeze the blood out in a normal pattern. This leads to an irregular and sometimes rapid heartbeat. Some people will develop associated symptoms such as a flip-flopping heartbeat, chest pain, lightheadedness, or shortness of breath. Other people may have no symptoms at all. Atrial fibrillation is serious because it affects the hearts ability to fill with blood as it should. Blood clots may form. This increases the risk for stroke. Untreated atrial fibrillation can also lead to heart failure. Atrial fibrillation can be controlled. With treatment, most people with atrial fibrillation lead normal lives.  Treatment options  Recommended treatment for atrial fibrillation depends on your age, symptoms, how long you have had atrial fibrillation, and other factors. You will have a complete evaluation to find out if you have any abnormalities that caused your heart to go into atrial fibrillation. This might be blocked heart arteries or a thyroid problem. Your doctor will assess your particular case and discuss choices with you.  Treatment choices may include:  · Treating an underlying disorder that puts you at risk for atrial fibrillation. For example, correcting an abnormal thyroid or electrolyte problem, or treating a blocked heart artery.  · Restoring a normal heart rhythm with an electrical shock (cardioversion) or with an antiarrhythmic medicine (chemical cardioversion)  · Using medicine to control your heart rate in atrial fibrillation.  · Preventing the risk for blood clot and stroke using blood-thinning medicines. Your doctor will tell you what he or she recommends. Choices may include aspirin, clopidogrel, warfarin, dabigatran, rivaroxaban, apixaban, and edoxaban.  · Doing catheter ablation or a surgical maze  procedure. These use different methods to destroy certain areas of heart tissue. This interrupts the electrical signals causing atrial fibrillation. One of these procedures may be a choice when medicines do not work, or as an alternative to long-term medicine.  · Other treatment choices may be recommended for you by your doctor.  Managing risk factors for stroke and preventing heart failure are important parts of any treatment plan for atrial fibrillation.  Home care  · Take your medicines exactly as directed. Dont skip doses.  · Work with your doctor to find the right medicines and doses for you.  · Learn to take your own pulse. Keep a record of your results. Ask your doctor which pulse rates mean that you need medical attention. Slowing your pulse is often the goal of treatment. Ask your doctor if its OK for you to use an automatic machine to check your pulse at home. Sometimes these machines dont count the pulse correctly when you have atrial fibrillation.  · Limit your intake of coffee, tea, cola, and other beverages with caffeine. Talk with your doctor about whether you should eliminate caffeine.  · Avoid over-the-counter medicines that have caffeine in them.  · Let your doctor know what medicines you take, including prescription and over-the-counter medicines, as well as any supplements. They interfere with some medicines given for atrial fibrillation.  · Ask your doctor about whether you can drink alcohol. Some people need to avoid alcohol to better treat atrial fibrillation. If you are taking blood-thinner medicines, alcohol may interfere with them by increasing their effect.  · Never take stimulants such as amphetamines or cocaine. These drugs can speed up your heart rate and trigger atrial fibrillation.  Follow-up care  Follow up with your doctor, or as advised.     When should I call my healthcare provider  Call your healthcare provider right away if you have any of the  following:  · Weakness  · Dizziness  · Fainting  · Fatigue  · Shortness of breath  · Chest pain with increased activity  · A change in the usual regularity of your heartbeat, or an unusually fast heartbeat   © 7561-1561 [employer]. [address] [place] [address], [place] [address]. All rights reserved. This information is not intended as a substitute for professional medical care. Always follow your healthcare professional's instructions.

## 2017-01-10 LAB
RVP - ADENOVIRUS: NORMAL
RVP - HUMAN METAPNEUMOVIRUS (HMPV): NORMAL
RVP - INFLUENZA A SUBTYPE H1 - (SEASONAL): NORMAL
RVP - INFLUENZA A SUBTYPE H3 - (SEASONAL): NORMAL
RVP - INFLUENZA A: NORMAL
RVP - INFLUENZA B: NORMAL
RVP - PARAINFLUENZA VIRUS 1: NORMAL
RVP - PARAINFLUENZA VIRUS 2: NORMAL
RVP - PARAINFLUENZA VIRUS 3: NORMAL
RVP - RESPIRATORY SYNCTIAL VIRUS (RSV) A: NORMAL
RVP - RESPIRATORY SYNCTIAL VIRUS (RSV) B: NORMAL
RVP - RESPIRATORY VIRAL PANEL, SOURCE: NORMAL
RVP - RHINOVIRUS: NORMAL

## 2017-01-11 ENCOUNTER — PATIENT MESSAGE (OUTPATIENT)
Dept: VASCULAR SURGERY | Facility: CLINIC | Age: 72
End: 2017-01-11

## 2017-01-11 NOTE — PHYSICIAN QUERY
"PT Name: Adriana Strong  MR #: [id number]  Physician Query Form - Heart  Condition Clarification   Reviewer  [email or web address]    This form is a permanent document in the medical record.     Query Date: January 11, 2017  By submitting this query, we are merely seeking further clarification of documentation. Please utilize your independent clinical judgment when addressing the question(s) below.  (The Medical record reflects the following:)   Indicators     Supporting Clinical Findings Location in Medical Record   x BNP = Miv=277* Lab   x EF = Normal left ventricular systolic function (EF 55-60%).  Echo 1/4    CXR findings:      "Ascites" documented      "SOB" or "WHITLEY" documented      "Hypoxia" documented     x CHF, HFpEF documented Echocardiogram noted - likely element of diastolic dysfunction although not commented on in report. Will need outpatient  PFT's.  Will check resting and ambulatory sats on room air prior to discharge.  PN 1/6    "Edema" documented      Diuretics/Meds      Treatment:     x Other:  Diastolic Heart Failure   - was on lasix remotely but hasn't taken in over a year  no volume overload on exam or on ECHO  PN 1/5     Provider, please specify diagnosis or diagnoses associated with above clinical findings.    [  ] Acute Systolic Heart Failure ( EF < 40)*  [  ] Acute on Chronic Systolic Heart Failure ( EF < 40)*  [  ] Chronic Systolic Heart Failure ( EF < 40)*  [  ] Acute Diastolic Heart Failure ( EF > 40)*  [  ] Acute on Chronic Diastolic Heart Failure( EF > 40)*  [ x ] Chronic Diastolic Heart Failure ( EF > 40)*  [  ] Acute Combined Systolic and Diastolic Heart Failure  [  ] Acute on Chronic Combined Systolic and Diastolic Heart Failure  [  ] Chronic Combined Systolic and Diastolic Heart Failure  *American Heart Association  [  ] Other Cardiac Diagnosis (Specify) ___________________________________  [  ] Clinically undetermined    Please document in your progress notes daily " for the duration of treatment, until resolved, and include in your discharge summary.

## 2017-01-13 ENCOUNTER — PATIENT MESSAGE (OUTPATIENT)
Dept: VASCULAR SURGERY | Facility: CLINIC | Age: 72
End: 2017-01-13

## 2017-01-19 ENCOUNTER — OFFICE VISIT (OUTPATIENT)
Dept: VASCULAR SURGERY | Facility: CLINIC | Age: 72
End: 2017-01-19
Payer: MEDICARE

## 2017-01-19 ENCOUNTER — HOSPITAL ENCOUNTER (OUTPATIENT)
Dept: VASCULAR SURGERY | Facility: CLINIC | Age: 72
Discharge: HOME | End: 2017-01-19
Attending: SURGERY
Payer: MEDICARE

## 2017-01-19 VITALS
SYSTOLIC BLOOD PRESSURE: 166 MMHG | HEIGHT: 64 IN | WEIGHT: 214 LBS | BODY MASS INDEX: 36.54 KG/M2 | HEART RATE: 94 BPM | DIASTOLIC BLOOD PRESSURE: 75 MMHG | TEMPERATURE: 97 F

## 2017-01-19 DIAGNOSIS — K55.1 CHRONIC MESENTERIC ISCHEMIA: ICD-10-CM

## 2017-01-19 DIAGNOSIS — K55.1 MESENTERIC ARTERY INSUFFICIENCY: ICD-10-CM

## 2017-01-19 DIAGNOSIS — R10.10 PAIN OF UPPER ABDOMEN: ICD-10-CM

## 2017-01-19 DIAGNOSIS — K55.1 MESENTERIC ARTERY INSUFFICIENCY: Primary | ICD-10-CM

## 2017-01-19 PROCEDURE — 3077F SYST BP >= 140 MM HG: CPT | Mod: S$GLB,,, | Performed by: SURGERY

## 2017-01-19 PROCEDURE — 1126F AMNT PAIN NOTED NONE PRSNT: CPT | Mod: S$GLB,,, | Performed by: SURGERY

## 2017-01-19 PROCEDURE — 3078F DIAST BP <80 MM HG: CPT | Mod: S$GLB,,, | Performed by: SURGERY

## 2017-01-19 PROCEDURE — 99214 OFFICE O/P EST MOD 30 MIN: CPT | Mod: S$GLB,,, | Performed by: SURGERY

## 2017-01-19 PROCEDURE — 1157F ADVNC CARE PLAN IN RCRD: CPT | Mod: S$GLB,,, | Performed by: SURGERY

## 2017-01-19 PROCEDURE — 99999 PR PBB SHADOW E&M-EST. PATIENT-LVL III: CPT | Mod: PBBFAC,,, | Performed by: SURGERY

## 2017-01-19 PROCEDURE — 1160F RVW MEDS BY RX/DR IN RCRD: CPT | Mod: S$GLB,,, | Performed by: SURGERY

## 2017-01-19 PROCEDURE — 93975 VASCULAR STUDY: CPT | Mod: S$GLB,,, | Performed by: SURGERY

## 2017-01-19 PROCEDURE — 1159F MED LIST DOCD IN RCRD: CPT | Mod: S$GLB,,, | Performed by: SURGERY

## 2017-01-19 NOTE — PROGRESS NOTES
Patient ID: Adriana Strong is a 72 y.o. female.    I. HISTORY     Chief Complaint: Follow-up      HPI Adriana Strong is a 71 y.o. female patient.     Pt with hx of chronic mesenteric ischemia with Occluded proximal SMA with unsuccessful angioplasty 04/2015, LIZ stenting and angioplasty with Open thrombectomy with patch angioplasty of the left brachial artery 05/2015 and most recently,. underwent aortogram with  LIZ stenting on 1/5/16. Since last procedure, patient had no resolution of symptoms.  She has been seeing Dr. Kemp with recent EGD for workup of her abdominal pain with was within normal limits, celiac work up was negative. She has long history of constipation which is being treated with stool softeners, miralax, fiber supplement. On protonix.. No nausea or vomiting. No abdominal pain following meals.  Pt was admitted to [facility] for atrial fibrillation and is now on coumadin.   Review of Systems   Constitution: Positive for weight loss. Negative for decreased appetite, fever, weakness and weight gain.        Hospitalized [facility] earlier this year due to afib  7 pound loss in last 3 weeks   Eyes: Negative for blurred vision.   Cardiovascular: Negative for chest pain.   Respiratory: Negative for cough and shortness of breath.    Hematologic/Lymphatic:        Taking coumadin   Gastrointestinal: Positive for abdominal pain. Negative for change in bowel habit, dysphagia and nausea.        Upper abdomen pain x 2 days       II. PHYSICAL EXAM     Vitals:    01/19/17 0902   BP: (!) 166/75   Pulse: 94   Temp: 97.1 °F (36.2 °C)     Right arm BP: 208/87      Physical Exam   Constitutional: She is oriented to person, place, and time. She appears well-developed and well-nourished.   Cardiovascular: Intact distal pulses.    Pulses:       Radial pulses are 2+ on the right side, and 2+ on the left side.        Dorsalis pedis pulses are 2+ on the right side, and 2+ on the left side.   Pulmonary/Chest:  Effort normal. No respiratory distress.   Abdominal: Soft. She exhibits no distension. There is no tenderness. There is no rebound and no guarding.   Musculoskeletal: Normal range of motion. She exhibits edema.   BlEs +1   Neurological: She is alert and oriented to person, place, and time.   Skin: Skin is warm and dry.   Upper arms with bruising bilaterally   Psychiatric: She has a normal mood and affect.     Imaging  1/19/17  Abdominal duplex ultrasound  Celiac PSV: 157  Sup. Mesenteric:   Inf. Mesenteric: PSV: 129  No evidence of stenosis    III. ASSESSMENT & PLAN (MEDICAL DECISION MAKING)     1. Mesenteric artery insufficiency     2. Chronic mesenteric ischemia             Assessment/Diagnosis and Plan:  1. F/U with 1 year with mesenteric abdominal ultrasound  2. There is a discrepancy between Left and Right arm. Instructed patient to always have BP taken in right arm for accuracy  3. Pt. Did not take BP meds this morning due to fasting for procedure.     Sybil [place], [place], APRN, FNP-BC  Nurse Practitioner  Vascular and Endovascular Surgery        Vascular Surgery Staff  I have seen and examined the patient and reviewed the residents note. I agree with their assessment and plan.    No symptoms of mesenteric ischemia. Duplex looks OK  Follow up in 1 year    Joana Hernandez MD FACS [facility]  Vascular & Endovascular Surgery

## 2017-01-30 ENCOUNTER — HOSPITAL ENCOUNTER (EMERGENCY)
Facility: HOSPITAL | Age: 72
Discharge: HOME | End: 2017-01-30
Attending: EMERGENCY MEDICINE
Payer: MEDICARE

## 2017-01-30 VITALS
RESPIRATION RATE: 16 BRPM | TEMPERATURE: 98 F | BODY MASS INDEX: 37.56 KG/M2 | HEART RATE: 99 BPM | OXYGEN SATURATION: 98 % | DIASTOLIC BLOOD PRESSURE: 60 MMHG | SYSTOLIC BLOOD PRESSURE: 121 MMHG | HEIGHT: 64 IN | WEIGHT: 220 LBS

## 2017-01-30 DIAGNOSIS — M79.669 PAIN AND SWELLING OF LOWER LEG: ICD-10-CM

## 2017-01-30 DIAGNOSIS — M79.89 PAIN AND SWELLING OF LOWER LEG: ICD-10-CM

## 2017-01-30 DIAGNOSIS — R07.9 CHEST PAIN: ICD-10-CM

## 2017-01-30 DIAGNOSIS — I48.91 ATRIAL FIBRILLATION WITH RVR: Primary | ICD-10-CM

## 2017-01-30 LAB
ALBUMIN SERPL BCP-MCNC: 4.1 G/DL
ALP SERPL-CCNC: 52 U/L
ALT SERPL W/O P-5'-P-CCNC: 19 U/L
ANION GAP SERPL CALC-SCNC: 13 MMOL/L
APTT BLDCRRT: 38.2 SEC
AST SERPL-CCNC: 18 U/L
BASOPHILS # BLD AUTO: 0.02 K/UL
BASOPHILS NFR BLD: 0.3 %
BILIRUB SERPL-MCNC: 0.5 MG/DL
BILIRUB UR QL STRIP: NEGATIVE
BNP SERPL-MCNC: 231 PG/ML
BUN SERPL-MCNC: 18 MG/DL
CALCIUM SERPL-MCNC: 9.8 MG/DL
CHLORIDE SERPL-SCNC: 101 MMOL/L
CLARITY UR: CLEAR
CO2 SERPL-SCNC: 27 MMOL/L
COLOR UR: YELLOW
CREAT SERPL-MCNC: 0.8 MG/DL
DIFFERENTIAL METHOD: ABNORMAL
EOSINOPHIL # BLD AUTO: 0.1 K/UL
EOSINOPHIL NFR BLD: 0.7 %
ERYTHROCYTE [DISTWIDTH] IN BLOOD BY AUTOMATED COUNT: 14.1 %
EST. GFR  (AFRICAN AMERICAN): >60 ML/MIN/1.73 M^2
EST. GFR  (NON AFRICAN AMERICAN): >60 ML/MIN/1.73 M^2
GLUCOSE SERPL-MCNC: 120 MG/DL
GLUCOSE UR QL STRIP: NEGATIVE
HCT VFR BLD AUTO: 44 %
HGB BLD-MCNC: 14.7 G/DL
HGB UR QL STRIP: NEGATIVE
INR PPP: 1.1
KETONES UR QL STRIP: NEGATIVE
LEUKOCYTE ESTERASE UR QL STRIP: NEGATIVE
LYMPHOCYTES # BLD AUTO: 2.3 K/UL
LYMPHOCYTES NFR BLD: 31.9 %
MCH RBC QN AUTO: 32.5 PG
MCHC RBC AUTO-ENTMCNC: 33.4 %
MCV RBC AUTO: 97 FL
MONOCYTES # BLD AUTO: 0.6 K/UL
MONOCYTES NFR BLD: 7.9 %
NEUTROPHILS # BLD AUTO: 4.2 K/UL
NEUTROPHILS NFR BLD: 58.8 %
NITRITE UR QL STRIP: NEGATIVE
PH UR STRIP: 7 [PH] (ref 5–8)
PLATELET # BLD AUTO: 270 K/UL
PMV BLD AUTO: 10 FL
POCT GLUCOSE: 112 MG/DL (ref 70–110)
POTASSIUM SERPL-SCNC: 4.4 MMOL/L
PROT SERPL-MCNC: 7.3 G/DL
PROT UR QL STRIP: NEGATIVE
PROTHROMBIN TIME: 11.6 SEC
RBC # BLD AUTO: 4.53 M/UL
SODIUM SERPL-SCNC: 141 MMOL/L
SP GR UR STRIP: 1.01 (ref 1–1.03)
TROPONIN I SERPL DL<=0.01 NG/ML-MCNC: 0.01 NG/ML
TROPONIN I SERPL DL<=0.01 NG/ML-MCNC: 0.01 NG/ML
URN SPEC COLLECT METH UR: NORMAL
UROBILINOGEN UR STRIP-ACNC: NEGATIVE EU/DL
WBC # BLD AUTO: 7.17 K/UL

## 2017-01-30 PROCEDURE — 84484 ASSAY OF TROPONIN QUANT: CPT

## 2017-01-30 PROCEDURE — 93005 ELECTROCARDIOGRAM TRACING: CPT

## 2017-01-30 PROCEDURE — 83880 ASSAY OF NATRIURETIC PEPTIDE: CPT

## 2017-01-30 PROCEDURE — 80053 COMPREHEN METABOLIC PANEL: CPT

## 2017-01-30 PROCEDURE — 84484 ASSAY OF TROPONIN QUANT: CPT | Mod: 91

## 2017-01-30 PROCEDURE — 93010 ELECTROCARDIOGRAM REPORT: CPT | Mod: ,,, | Performed by: INTERNAL MEDICINE

## 2017-01-30 PROCEDURE — 85610 PROTHROMBIN TIME: CPT

## 2017-01-30 PROCEDURE — 25000003 PHARM REV CODE 250: Performed by: NURSE PRACTITIONER

## 2017-01-30 PROCEDURE — 99284 EMERGENCY DEPT VISIT MOD MDM: CPT

## 2017-01-30 PROCEDURE — 81003 URINALYSIS AUTO W/O SCOPE: CPT

## 2017-01-30 PROCEDURE — 85730 THROMBOPLASTIN TIME PARTIAL: CPT

## 2017-01-30 PROCEDURE — 85025 COMPLETE CBC W/AUTO DIFF WBC: CPT

## 2017-01-30 RX ORDER — AMOXICILLIN AND CLAVULANATE POTASSIUM 875; 125 MG/1; MG/1
1 TABLET, FILM COATED ORAL 2 TIMES DAILY
Qty: 14 TABLET | Refills: 0 | Status: SHIPPED | OUTPATIENT
Start: 2017-01-30 | End: 2017-02-09

## 2017-01-30 RX ORDER — SODIUM CHLORIDE 9 MG/ML
1000 INJECTION, SOLUTION INTRAVENOUS
Status: COMPLETED | OUTPATIENT
Start: 2017-01-30 | End: 2017-01-30

## 2017-01-30 RX ADMIN — SODIUM CHLORIDE 1000 ML: 0.9 INJECTION, SOLUTION INTRAVENOUS at 10:01

## 2017-01-30 NOTE — ED TRIAGE NOTES
Patient complaints of shortness of breath, chest pressure 6/10, bilateral legs swelling with redness to bilateral lower legs with warmth to left lower leg.  Patient is on multiple blood thinners, but did not take plavix this morning.

## 2017-01-30 NOTE — ED NOTES
Report received from daisy caraballo patient awake alert ox4 in no respiratory distress family at [facility] patient on monitor nurse will continue to monitor no new orders at this time

## 2017-01-30 NOTE — ED NOTES
Patient identifiers verified and correct for Adriana Strong.    LOC: The patient is awake, alert and aware of environment with an appropriate affect, the patient is oriented x 3 and speaking appropriately.  APPEARANCE: Patient  in acute distress, patient is clean and well groomed, patient's clothing is properly fastened.  SKIN: The skin is warm and dry, color consistent with ethnicity, patient has normal skin turgor and moist mucus membranes, skin intact, no breakdown or bruising noted.  MUSCULOSKELETAL: Patient moving all extremities spontaneously.  Bilateral legs are red, swollen and warmth to left lower leg.  RESPIRATORY: Airway is open and patent, respirations are spontaneous, patient has a normal effort and rate, no accessory muscle use noted, bilateral breath sounds clear. Shortness of breath.  CARDIAC: Patient has a normal rate and regular rhythm, no periphreal edema noted, capillary refill < 3 seconds.  Chest pain.  ABDOMEN: Soft and non tender to palpation, no distention noted, normoactive bowel sounds present in all four quadrants.  NEUROLOGIC: PERRL, 4 mm bilaterally, eyes open spontaneously, behavior appropriate to situation, follows commands, facial expression symmetrical, bilateral hand grasp equal and even, purposeful motor response noted, normal sensation in all extremities when touched with a finger.

## 2017-01-30 NOTE — ED PROVIDER NOTES
"Encounter Date: 1/30/2017    SCRIBE #1 NOTE: I, am scribing for, and in the presence of. I have scribed the following portions of the note -       History     Chief Complaint   Patient presents with    Shortness of Breath     worsening sob with fluid in legs.    Chest Pain     4 and 6 am, non now. + heaviness and tightness now.     Review of patient's allergies indicates:   Allergen Reactions    Celebrex [celecoxib] Shortness Of Breath    Dicyclomine Hives    Adhesive Dermatitis    Avelox [moxifloxacin] Swelling    Cilostazol Other (See Comments)     Elevates blood pressure    Eryc [erythromycin] Other (See Comments)     unknown    Iodine and iodide containing products Hives    Keflex [cephalexin] Hives    Meclomen      rashes    Meloxicam      Ear ringing    Metoclopramide Other (See Comments)     High blood pressure    Sulfa (sulfonamide antibiotics) Itching     HPI Comments: 73yo female with PMHx of anticoagulant use (plavix and Coumadin), HTN A-fib, CAD, DM, GERD, chronic mesenteric ischemia, and asthma presents for SOB since yesterday.  Pt was recently DC'd from this hospital 3 weeks ago after an asthma exacerbation and A-fib RVR with subtherapeutic INR. Pt reports that she had chest pain at 0400 and 0600 today, but it has now changed to chest "tightness."  Pt reports this feeling is different than her usual asthma exacerbation.  She has noticed today that her legs are hurting, especially her left leg.  She has also noticed her left lower leg is red.  Pt denies fever, sore throat, nasal congestion, palpitations, hemoptysis, abd pain, n/v/d, dysuria, and rash.  She reports she has been taking her anticoagulants as directed, but she has not yet taken her Plavix today.      Patient is a 72 y.o. female presenting with the following complaint: shortness of breath. The history is provided by the patient, the spouse and medical records.   Shortness of Breath   This is a new problem. The average episode " lasts 1 day. The problem occurs continuously.The problem has been gradually worsening. Associated symptoms include cough, sputum production (white), chest pain, leg pain and leg swelling. Pertinent negatives include no fever, no headaches, no rhinorrhea, no sore throat, no wheezing, no vomiting, no abdominal pain and no rash. It is unknown what precipitated the problem. She has tried nothing for the symptoms. The treatment provided no relief. She has had prior hospitalizations. She has had prior ED visits. She has had no prior ICU admissions. Associated medical issues include CAD, heart failure and DVT. Associated medical issues comments: recent admission.     Past Medical History   Diagnosis Date    Anticoagulant long-term use      on Plavix since May 2015    Anxiety     Arthritis     Asthma     Cataracts, bilateral     Chest pain, atypical     Colon polyp     Coronary artery disease     Diabetes mellitus     Dry eyes     Esophageal erosions     GERD (gastroesophageal reflux disease)     Heart murmur     Hemorrhoid     High cholesterol     Hypertension     Irritable bowel syndrome     Shingles 2015    TIA (transient ischemic attack)     Use of cane as ambulatory aid      No past medical history pertinent negatives.  Past Surgical History   Procedure Laterality Date    Total knee arthroplasty Bilateral     Nose polyp      Breast surgery       left--- a lump--- no cancer    Tonsillectomy      Left nasal polyp      Hysterectomy  partial     1982 partial hysterectomy    Total abdominal hysterectomy       2014    Left neck lymph node      Right hip fatty mass tissue      Upper gastrointestinal endoscopy  2014    Colonoscopy  2014    Abdominal surgery      Stent to small intestine      Angiocele       2007, 2014    Superior vena cava angioplasty / stenting       Family History   Problem Relation Age of Onset    Colon cancer Neg Hx     Inflammatory bowel disease Neg Hx      Social  History   Substance Use Topics    Smoking status: Never Smoker    Smokeless tobacco: None    Alcohol use No     Review of Systems   Constitutional: Negative for chills, fatigue and fever.   HENT: Negative for rhinorrhea and sore throat.    Respiratory: Positive for cough, sputum production (white), chest tightness and shortness of breath. Negative for wheezing.    Cardiovascular: Positive for chest pain and leg swelling. Negative for palpitations.   Gastrointestinal: Negative for abdominal pain, diarrhea, nausea and vomiting.   Musculoskeletal: Negative for arthralgias, back pain and myalgias.   Skin: Negative for pallor and rash.   Allergic/Immunologic: Negative for immunocompromised state.   Neurological: Negative for weakness, numbness and headaches.   Hematological: Bruises/bleeds easily (on anticoagulants).   Psychiatric/Behavioral: Negative for confusion.   All other systems reviewed and are negative.      Physical Exam   Initial Vitals   BP Pulse Resp Temp SpO2   01/30/17 0837 01/30/17 0837 01/30/17 0837 01/30/17 0837 01/30/17 0837   204/143 122 20 98 °F (36.7 °C) 99 %     Physical Exam    Nursing note and vitals reviewed.  Constitutional: She appears well-developed and well-nourished. She is Obese . She is active and cooperative.  Non-toxic appearance. She does not have a sickly appearance. She does not appear ill. She appears distressed (mild).   HENT:   Head: Normocephalic and atraumatic.   Eyes: Conjunctivae and EOM are normal.   Neck: Normal range of motion. Neck supple.   Cardiovascular: Normal rate. An irregularly irregular rhythm present.   Murmur heard.  Pulses:       Radial pulses are 2+ on the right side, and 2+ on the left side.        Dorsalis pedis pulses are 2+ on the right side, and 2+ on the left side.   Pulmonary/Chest: Effort normal and breath sounds normal. She has no wheezes. She has no rhonchi.   Abdominal: Soft. Normal appearance and bowel sounds are normal. She exhibits no  distension. There is no tenderness.   Musculoskeletal:        Right knee: Normal.        Left knee: Normal.        Right ankle: Normal.        Left ankle: Normal.        Right lower leg: She exhibits swelling. She exhibits no tenderness, no edema, no deformity and no laceration.        Left lower leg: She exhibits tenderness and edema (very mild). She exhibits no bony tenderness, no deformity and no laceration.        Legs:       Right foot: Normal.        Left foot: Normal.   Neurological: She is alert and oriented to person, place, and time. She has normal strength. No sensory deficit. GCS eye subscore is 4. GCS verbal subscore is 5. GCS motor subscore is 6.   Skin: Skin is warm, dry and intact. No abrasion, no bruising, no burn and no rash noted. There is erythema (LLE).        Psychiatric: She has a normal mood and affect. Her speech is normal and behavior is normal. Judgment and thought content normal. Cognition and memory are normal.         ED Course   Procedures  Labs Reviewed   CBC W/ AUTO DIFFERENTIAL - Abnormal; Notable for the following:        Result Value    MCH 32.5 (*)     All other components within normal limits   COMPREHENSIVE METABOLIC PANEL - Abnormal; Notable for the following:     Glucose 120 (*)     Alkaline Phosphatase 52 (*)     All other components within normal limits   B-TYPE NATRIURETIC PEPTIDE - Abnormal; Notable for the following:      (*)     All other components within normal limits   APTT - Abnormal; Notable for the following:     aPTT 38.2 (*)     All other components within normal limits   PROTIME-INR   TROPONIN I   URINALYSIS   TROPONIN I        Imaging Results         US Lower Extremity Veins Bilateral (Final result) Result time:  01/30/17 10:24:34    Final result by Moises Rice DO (01/30/17 10:24:34)    Impression:     No evidence for bilateral lower extremity deep venous thrombosis. Clinical correlation and follow up as clinically warranted.       Electronically  signed by: JESUS AARON DO  Date:     01/30/17  Time:    10:24     Narrative:    Procedure: Bilateral lower extremity venous ultrasonography.    Technique: grayscale graded compression, color-flow and Doppler wave form imaging of the bilateral lower extremity venous system.         Comparison: None    Results: The bilateral common femoral, superficial femoral and popliteal veins demonstrate normal gray scale graded compression, color-flow and Doppler wave forms. The Doppler wave form imaging demonstrate normal respiratory phasicity and augmentation. No evidence for bilateral lower extremity deep venous thrombosis.            X-Ray Chest 1 View (Final result) Result time:  01/30/17 09:52:16    Final result by Hermes Hooks MD (01/30/17 09:52:16)    Impression:        Low lung volumes without acute cardiothoracic disease.      Electronically signed by: HERMES HOOKS MD  Date:     01/30/17  Time:    09:52     Narrative:    PORTABLE AP CHEST:      Comparison: 1/3/17    Findings:     Lung volumes are low.The lungs are clear. There is no pleural effusion or pneumothorax. The cardiac silhouette isunchanged in size.  There are no acute bony abnormalities.                 Medical Decision Making:   History:   Old Medical Records: I decided to obtain old medical records.  Old Records Summarized: records from previous admission(s).  Initial Assessment:   71yo female here for SOB and CP. PMHX of HTN, A-fib, CAD, DM, GERD, asthma, chronic mesenteric ischemia.  Pt also reports LE swelling and pain.  Chronic anticoagulant use, recent admission. /143,  irregularly irregular. Lungs CTA and equal, comfortable work of breathing. Abd soft, non-tender, non-distended.  LE mild edema and +Guadalupe's sign on left.  LLE mild erythema to anterior surface.  DP 2+ bilaterally by palp.   Differential Diagnosis:   STEMI/NSTEMI, DVT, A-fib RVR, electrolyte abnormality, pneumonia, asthma exacerbation, CHF, subtherapeutic INR,  UTI  Clinical Tests:   Lab Tests: Reviewed and Ordered  Radiological Study: Ordered and Reviewed  Medical Tests: Ordered and Reviewed  ED Management:  Labs, Iv fluids, EKG, CXR, LE Doppler US, CXR  Pt was turned over to  at 1000.               Attending Attestation:     Physician Attestation Statement for NP/PA:   I have conducted a face to face encounter with this patient in addition to the NP/PA, due to NP/PA Request    Other NP/PA Attestation Additions:    History of Present Illness: 72-year-old female with shortness of breath.  She has a history of A. fib.  Patient also had pain in both of her legs and noticed redness to her left lower leg.                   ED Course     Clinical Impression:   The primary encounter diagnosis was Atrial fibrillation with RVR. Diagnoses of Chest pain and Pain and swelling of lower leg were also pertinent to this visit.          KHRIS Perry  01/30/17 1801       Davian Carpio MD  01/30/17 0247

## 2017-01-30 NOTE — DISCHARGE INSTRUCTIONS
Discharge Instructions for Cellulitis  You have been diagnosed with cellulitis. This is an infection in the deepest layer of the skin. In some cases, the infection also affects the muscle. Cellulitis is caused by bacteria. The bacteria can enter the body through broken skin. This can happen with a cut, scratch, animal bite, or an insect bite that has been scratched. You may have been treated in the hospital with antibiotics and fluids. You will likely be given a prescription for antibiotics to take at home. This sheet will help you take care of yourself at home.  Home care  When you are home:  · Take the prescribed antibiotic medicine you are given as directed until it is gone. Take it even if you feel better. It treats the infection and stops it from returning. Not taking all the medicine can make future infections hard to treat.  · Keep the infected area clean.  · When possible, raise the infected area above the level of your heart. This helps keep swelling down.  · Talk with your healthcare provider if you are in pain. Ask what kind of over-the-counter medicine you can take for pain.  · Apply clean bandages as advised.  · Take your temperature once a day for a week.  · Wash your hands often to prevent spreading the infection.  In the future, wash your hands before and after you touch cuts, scratches, or bandages. This will help prevent infection.   When to call your healthcare provider  Call your healthcare provider immediately if you have any of the following:  · Difficulty or pain when moving the joints above or below the infected area  · Discharge or pus draining from the area  · Fever of 100.4°F (38°C) or higher, or as directed by your healthcare provider  · Pain that gets worse in or around the infected   · Redness that gets worse in or around the infected area, particularly if the area of redness expands to a wider area  · Shaking chills  · Swelling of the infected area  · Vomiting   © 6098-7067 [employer]. [address] [place] [address], [place] [address]. All rights reserved. This information is not intended as a substitute for professional medical care. Always follow your healthcare professional's instructions.

## 2017-01-30 NOTE — ED AVS SNAPSHOT
[facility] [address]  [place] [address]               Adriana Strong   2017  8:39 AM   ED    Description:  Female : 1945   Department:  [facility]           Your Care was Coordinated By:     Provider Role From To    Davian Carpio MD Attending Provider 17 1934 --    KHRIS Perry Nurse Practitioner 17 [id number] --      Reason for Visit     Shortness of Breath     Chest Pain           Diagnoses this Visit        Comments    Atrial fibrillation with RVR    -  Primary     Chest pain         Pain and swelling of lower leg           ED Disposition     ED Disposition Condition Comment    Discharge             To Do List           Follow-up Information     Schedule an appointment as soon as possible for a visit with Krzysztof Mcgraw MD.    Specialty:  Family Medicine    Contact information:    [address] [facility]  [address]  [place] [id number] [phone]         These Medications        Disp Refills Start End    amoxicillin-clavulanate 875-125mg (AUGMENTIN) 875-125 mg per tablet 14 tablet 0 2017    Take 1 tablet by mouth 2 (two) times daily. - Oral    Pharmacy: [facility]/pharmacy #[id number] - [address] AT [facility] Ph #: [phone]         [facility]     [facility] -  Assistance  Registered nurses in the [facility] provide clinical advisement, health education, appointment booking, and other advisory services.  Call for this free service at [phone].             Medications           Message regarding Medications     Verify the changes and/or additions to your medication regime listed below are the same as discussed with your clinician today.  If any of these changes or additions are incorrect, please notify your healthcare provider.        START taking these NEW medications        Refills    amoxicillin-clavulanate  875-125mg (AUGMENTIN) 875-125 mg per tablet 0    Sig: Take 1 tablet by mouth 2 (two) times daily.    Class: Print    Route: Oral      These medications were administered today        Dose Freq    0.9%  NaCl infusion 1,000 mL ED 1 Time    Sig: Inject 1,000 mLs into the vein ED 1 Time.    Class: Normal    Route: Intravenous           Verify that the below list of medications is an accurate representation of the medications you are currently taking.  If none reported, the list may be blank. If incorrect, please contact your healthcare provider. Carry this list with you in case of emergency.           Current Medications     acetaminophen (TYLENOL ARTHRITIS) 650 MG TbSR Take 1,300 mg by mouth 2 (two) times daily.    albuterol (ACCUNEB) 1.25 mg/3 mL Nebu Take scheduled q4 for the next two days while at home then resume prn dosing    amoxicillin-clavulanate 875-125mg (AUGMENTIN) 875-125 mg per tablet Take 1 tablet by mouth 2 (two) times daily.    ascorbic acid (VITAMIN C) 500 MG tablet Take 500 mg by mouth once daily.    azelastine (ASTELIN) 137 mcg nasal spray 1 spray by Nasal route 2 (two) times daily.    calcium carbonate (OS-MICHAEL) 600 mg (1,500 mg) Tab Take 1,200 mg by mouth once daily.    clopidogrel (PLAVIX) 75 mg tablet Take 1 tablet (75 mg total) by mouth once daily.    cyanocobalamin (VITAMIN B-12) 1000 MCG tablet Take 100 mcg by mouth once daily.    digoxin (LANOXIN) 125 mcg tablet Take 1 tablet (125 mcg total) by mouth once daily.    diltiaZEM (CARDIZEM SR) 60 MG Cp12 Take 3 capsules (180 mg total) by mouth 2 (two) times daily.    doxycycline (VIBRA-TABS) 100 MG tablet Take 1 tablet (100 mg total) by mouth every 12 (twelve) hours.    ferrous sulfate 325 mg (65 mg iron) Tab tablet Take 325 mg by mouth daily with breakfast.    fish oil-omega-3 fatty acids 300-1,000 mg capsule Take 1 g by mouth once daily.     fluticasone (FLONASE) 50 mcg/actuation nasal spray 1 spray by Each Nare route once daily.     "fluticasone-salmeterol 250-50 mcg/dose (ADVAIR DISKUS) 250-50 mcg/dose diskus inhaler Inhale 2 puffs into the lungs 2 (two) times daily.    FLUZONE HIGH-DOSE 2016-17, PF, 180 mcg/0.5 mL Syrg TO BE ADMINISTERED BY PHARMACIST FOR IMMUNIZATION    lisinopril (PRINIVIL,ZESTRIL) 20 MG tablet Take 1 tablet (20 mg total) by mouth once daily.    lorazepam (ATIVAN) 0.5 MG tablet Take 0.5 mg by mouth once daily.    magnesium 250 mg Tab Take 750 mg by mouth as needed.     montelukast (SINGULAIR) 10 mg tablet Take 10 mg by mouth every evening.    oxybutynin (DITROPAN-XL) 5 MG TR24     pantoprazole (PROTONIX) 40 MG tablet Take 1 tablet (40 mg total) by mouth once daily.    polyethylene glycol (GLYCOLAX) 17 gram PwPk Take 17 g by mouth once daily.    POLYSORBATE 80/GLYCERIN (REFRESH DRY EYE THERAPY OPHT) Apply to eye 2 (two) times daily.    predniSONE (DELTASONE) 20 MG tablet     PROAIR HFA 90 mcg/actuation inhaler     psyllium (METAMUCIL) powder Take 1 packet by mouth 3 (three) times daily.    warfarin (COUMADIN) 7.5 MG tablet Take 7.5mg M & F and 5 mg SSTWT.           Clinical Reference Information           Your Vitals Were     BP Pulse Temp Resp Height Weight    121/60 99 98 °F (36.7 °C) (Oral) 16 5' 4" (1.626 m) 99.8 kg (220 lb)    Last Period SpO2 BMI          01/21/1973 (Approximate) 98% 37.76 kg/m2        Allergies as of 1/30/2017        Reactions    Celebrex [Celecoxib] Shortness Of Breath    Dicyclomine Hives    Adhesive Dermatitis    Avelox [Moxifloxacin] Swelling    Cilostazol Other (See Comments)    Elevates blood pressure    Eryc [Erythromycin] Other (See Comments)    unknown    Iodine And Iodide Containing Products Hives    Keflex [Cephalexin] Hives    Meclomen     rashes    Meloxicam     Ear ringing    Metoclopramide Other (See Comments)    High blood pressure    Sulfa (Sulfonamide Antibiotics) Itching      Immunizations Administered on Date of Encounter - 1/30/2017     None      ED Micro, Lab, POCT     Start " Ordered       Status Ordering Provider    01/30/17 1059 01/30/17 1058  Troponin I  STAT      Final result     01/30/17 0846 01/30/17 0845  CBC auto differential  STAT      Final result     01/30/17 0846 01/30/17 0845  Comprehensive metabolic panel  STAT      Final result     01/30/17 0846 01/30/17 0845  Protime-INR  STAT      Final result     01/30/17 0846 01/30/17 0845  Troponin I  STAT      Final result     01/30/17 0846 01/30/17 0845  Brain natriuretic peptide  STAT      Final result     01/30/17 0846 01/30/17 0845  APTT  STAT      Final result     01/30/17 0846 01/30/17 0845  Urinalysis  STAT      Final result       ED Imaging Orders     Start Ordered       Status Ordering Provider    01/30/17 0854 01/30/17 0854  X-Ray Chest 1 View  1 time imaging      Final result     01/30/17 0854 01/30/17 0854  US Lower Extremity Veins Bilateral  1 time imaging      Final result         Discharge Instructions         Discharge Instructions for Cellulitis  You have been diagnosed with cellulitis. This is an infection in the deepest layer of the skin. In some cases, the infection also affects the muscle. Cellulitis is caused by bacteria. The bacteria can enter the body through broken skin. This can happen with a cut, scratch, animal bite, or an insect bite that has been scratched. You may have been treated in the hospital with antibiotics and fluids. You will likely be given a prescription for antibiotics to take at home. This sheet will help you take care of yourself at home.  Home care  When you are home:  · Take the prescribed antibiotic medicine you are given as directed until it is gone. Take it even if you feel better. It treats the infection and stops it from returning. Not taking all the medicine can make future infections hard to treat.  · Keep the infected area clean.  · When possible, raise the infected area above the level of your heart. This helps keep swelling down.  · Talk with your healthcare provider if you are  in pain. Ask what kind of over-the-counter medicine you can take for pain.  · Apply clean bandages as advised.  · Take your temperature once a day for a week.  · Wash your hands often to prevent spreading the infection.  In the future, wash your hands before and after you touch cuts, scratches, or bandages. This will help prevent infection.   When to call your healthcare provider  Call your healthcare provider immediately if you have any of the following:  · Difficulty or pain when moving the joints above or below the infected area  · Discharge or pus draining from the area  · Fever of 100.4°F (38°C) or higher, or as directed by your healthcare provider  · Pain that gets worse in or around the infected   · Redness that gets worse in or around the infected area, particularly if the area of redness expands to a wider area  · Shaking chills  · Swelling of the infected area  · Vomiting   © 7649-4281 [employer]. [address] [place] [address]. All rights reserved. This information is not intended as a substitute for professional medical care. Always follow your healthcare professional's instructions.          Your Scheduled Appointments     Feb 03, 2017 12:00 PM CST   Follow Up with Jr Carrington MD   [facility] ([facility])    [address] [place] [address]  [address] [place] [id number] [phone]               [facility] complies with applicable Federal civil rights laws and does not discriminate on the basis of race, color, national origin, age, disability, or sex.        Language Assistance Services     ATTENTION: Language assistance services are available, free of charge. Please call [phone].      ATENCIÓN: Si habla sammie, tiene a jorge disposición servicios gratuitos de asistencia lingüística. Llame al [phone].     CHÚ Ý: N?u b?n nói Ti?ng Vi?t, có các d?ch v? h? tr? ngôn ng? mi?n phí dành cho b?n. G?i s? [phone].

## 2017-02-03 PROBLEM — I35.0 AORTIC STENOSIS, MILD: Status: ACTIVE | Noted: 2017-02-03

## 2017-02-24 PROBLEM — R60.0 LOCALIZED EDEMA: Status: ACTIVE | Noted: 2017-02-24

## 2017-04-11 ENCOUNTER — TELEPHONE (OUTPATIENT)
Dept: GASTROENTEROLOGY | Facility: CLINIC | Age: 72
End: 2017-04-11

## 2017-04-11 ENCOUNTER — PATIENT MESSAGE (OUTPATIENT)
Dept: GASTROENTEROLOGY | Facility: CLINIC | Age: 72
End: 2017-04-11

## 2017-04-19 ENCOUNTER — OFFICE VISIT (OUTPATIENT)
Dept: GASTROENTEROLOGY | Facility: CLINIC | Age: 72
End: 2017-04-19
Payer: MEDICARE

## 2017-04-19 VITALS
HEART RATE: 84 BPM | WEIGHT: 220.44 LBS | SYSTOLIC BLOOD PRESSURE: 150 MMHG | DIASTOLIC BLOOD PRESSURE: 94 MMHG | HEIGHT: 63 IN | BODY MASS INDEX: 39.06 KG/M2

## 2017-04-19 DIAGNOSIS — K42.9 UMBILICAL HERNIA WITHOUT OBSTRUCTION AND WITHOUT GANGRENE: Primary | ICD-10-CM

## 2017-04-19 PROCEDURE — 1157F ADVNC CARE PLAN IN RCRD: CPT | Mod: S$GLB,,, | Performed by: INTERNAL MEDICINE

## 2017-04-19 PROCEDURE — 1160F RVW MEDS BY RX/DR IN RCRD: CPT | Mod: S$GLB,,, | Performed by: INTERNAL MEDICINE

## 2017-04-19 PROCEDURE — 1125F AMNT PAIN NOTED PAIN PRSNT: CPT | Mod: S$GLB,,, | Performed by: INTERNAL MEDICINE

## 2017-04-19 PROCEDURE — 99213 OFFICE O/P EST LOW 20 MIN: CPT | Mod: S$GLB,,, | Performed by: INTERNAL MEDICINE

## 2017-04-19 PROCEDURE — 99999 PR PBB SHADOW E&M-EST. PATIENT-LVL III: CPT | Mod: PBBFAC,,, | Performed by: INTERNAL MEDICINE

## 2017-04-19 PROCEDURE — 1159F MED LIST DOCD IN RCRD: CPT | Mod: S$GLB,,, | Performed by: INTERNAL MEDICINE

## 2017-04-19 PROCEDURE — 3080F DIAST BP >= 90 MM HG: CPT | Mod: S$GLB,,, | Performed by: INTERNAL MEDICINE

## 2017-04-19 PROCEDURE — 3077F SYST BP >= 140 MM HG: CPT | Mod: S$GLB,,, | Performed by: INTERNAL MEDICINE

## 2017-04-19 RX ORDER — TRAMADOL HYDROCHLORIDE 50 MG/1
50 TABLET ORAL EVERY 6 HOURS PRN
Qty: 60 TABLET | Refills: 3 | Status: SHIPPED | OUTPATIENT
Start: 2017-04-19 | End: 2018-01-16 | Stop reason: SDUPTHER

## 2017-04-19 NOTE — PROGRESS NOTES
[facility] Note    Reason for Consult:  The encounter diagnosis was Umbilical hernia without obstruction and without gangrene.    PCP:   Krzysztof Mcgraw       Referring MD:  No referring provider defined for this encounter.    HPI:  This is a 72 y.o. female here for evaluation of abdominal pain.  Used to see Dr. Phelps at [facility]. Had SMA intervention 4/15.  Takes miralax qd to bid and stool softeners for constipation.    Interval history:  Had been off metformin which had helped her pain for quite a while but now having recurrence of periumbilical abdominal pain. Starts around belly button, she feels a bulge, and radiates around. NO relation to food or bowel movements    Some dyspepsia as well, but EGD last year normal and on PPI.  Saw vascular this year and her stents are fine. She is on Dual antiplatelet therapy      ROS:  Constitutional: No fevers, chills, No weight loss  ENT: No allergies  CV: No chest pain  Pulm: No cough, + shortness of breath with exertion  Ophtho: No vision changes  GI: see HPI  Derm: No rash  Heme: No lymphadenopathy, + easy bruising  MSK: + left shoulder arthritis, got injection of steroids  : No dysuria, No hematuria  Endo: No hot or cold intolerance  Neuro: No syncope, No seizure  Psych: No anxiety, No depression    Medical History:  has a past medical history of Anticoagulant long-term use; Anxiety; Arthritis; Asthma; Cataracts, bilateral; Chest pain, atypical; Colon polyp; Coronary artery disease; Diabetes mellitus; Dry eyes; Esophageal erosions; GERD (gastroesophageal reflux disease); Heart murmur; Hemorrhoid; High cholesterol; Hypertension; Irritable bowel syndrome; Shingles (2015); TIA (transient ischemic attack); and Use of cane as ambulatory aid.    Surgical History:  has a past surgical history that includes Total knee arthroplasty (Bilateral); nose polyp; Breast surgery; Tonsillectomy; left nasal polyp; Hysterectomy (partial); Total abdominal  hysterectomy; left neck lymph node; right hip fatty mass tissue; Upper gastrointestinal endoscopy (2014); Colonoscopy (2014); Abdominal surgery; stent to small intestine; angiocele; and Superior vena cava angioplasty / stenting.    Family History: family history is negative for Colon cancer and Inflammatory bowel disease..     Social History:  reports that she has never smoked. She does not have any smokeless tobacco history on file. She reports that she does not drink alcohol or use illicit drugs.    Review of patient's allergies indicates:   Allergen Reactions    Celebrex [celecoxib] Shortness Of Breath    Dicyclomine Hives    Adhesive Dermatitis    Avelox [moxifloxacin] Swelling    Cilostazol Other (See Comments)     Elevates blood pressure    Eryc [erythromycin] Other (See Comments)     unknown    Iodine and iodide containing products Hives    Keflex [cephalexin] Hives    Meclomen      rashes    Meloxicam      Ear ringing    Metoclopramide Other (See Comments)     High blood pressure    Sulfa (sulfonamide antibiotics) Itching     Current Outpatient Prescriptions   Medication Sig Dispense Refill    acetaminophen (TYLENOL ARTHRITIS) 650 MG TbSR Take 1,300 mg by mouth 2 (two) times daily.      albuterol (ACCUNEB) 1.25 mg/3 mL Nebu Take scheduled q4 for the next two days while at home then resume prn dosing 3 mL 1    apixaban 5 mg Tab Take 1 tablet (5 mg total) by mouth 2 (two) times daily. 60 tablet 11    ascorbic acid (VITAMIN C) 500 MG tablet Take 500 mg by mouth once daily.      azelastine (ASTELIN) 137 mcg nasal spray 1 spray by Nasal route 2 (two) times daily.      calcium carbonate (OS-MICHAEL) 600 mg (1,500 mg) Tab Take 1,200 mg by mouth once daily.      clopidogrel (PLAVIX) 75 mg tablet Take 1 tablet (75 mg total) by mouth once daily. 90 tablet 4    cyanocobalamin (VITAMIN B-12) 1000 MCG tablet Take 100 mcg by mouth once daily.      digoxin (LANOXIN) 125 mcg tablet Take 1 tablet (125 mcg  total) by mouth once daily. 90 tablet 3    diltiaZEM (CARDIZEM SR) 60 MG Cp12 Take 3 capsules (180 mg total) by mouth 2 (two) times daily. 180 capsule 11    ferrous sulfate 325 mg (65 mg iron) Tab tablet Take 325 mg by mouth daily with breakfast.      fish oil-omega-3 fatty acids 300-1,000 mg capsule Take 1 g by mouth once daily.       fluticasone (FLONASE) 50 mcg/actuation nasal spray 1 spray by Each Nare route once daily.      fluticasone-salmeterol 250-50 mcg/dose (ADVAIR DISKUS) 250-50 mcg/dose diskus inhaler Inhale 2 puffs into the lungs 2 (two) times daily. 14 each 6    FLUZONE HIGH-DOSE 2016-17, PF, 180 mcg/0.5 mL Syrg TO BE ADMINISTERED BY PHARMACIST FOR IMMUNIZATION  0    furosemide (LASIX) 40 MG tablet Take 1 tablet (40 mg total) by mouth once daily. 90 tablet 3    lisinopril (PRINIVIL,ZESTRIL) 20 MG tablet Take 1 tablet (20 mg total) by mouth once daily. 90 tablet 3    lorazepam (ATIVAN) 0.5 MG tablet Take 0.5 mg by mouth once daily.      magnesium 250 mg Tab Take 250 mg by mouth. 3 in am and 3 pm      montelukast (SINGULAIR) 10 mg tablet Take 10 mg by mouth every evening.      mupirocin (BACTROBAN) 2 % ointment USE 1 APPLICATION ON THE SKIN THREE TIMES A DAY TO FOREARM SKIN ABRASION  0    oxybutynin (DITROPAN-XL) 5 MG TR24       pantoprazole (PROTONIX) 40 MG tablet Take 1 tablet (40 mg total) by mouth once daily. 90 tablet 3    polyethylene glycol (GLYCOLAX) 17 gram PwPk Take 17 g by mouth once daily.      POLYSORBATE 80/GLYCERIN (REFRESH DRY EYE THERAPY OPHT) Apply to eye 2 (two) times daily.      potassium gluconate 550 mg (90 mg) Tab Take by mouth. 2 in am and 2 pm      psyllium (METAMUCIL) powder Take 1 packet by mouth 3 (three) times daily.      triamcinolone acetonide 0.1% (KENALOG) 0.1 % cream USE 1 APPLICATION ON THE SKIN TWICE A DAY BACK LESIONS  0    doxycycline (VIBRA-TABS) 100 MG tablet Take 1 tablet (100 mg total) by mouth every 12 (twelve) hours. 5 tablet 0    tramadol  "(ULTRAM) 50 mg tablet Take 1 tablet (50 mg total) by mouth every 6 (six) hours as needed for Pain. 60 tablet 3     No current facility-administered medications for this visit.        Objective Findings:    Vital Signs:  BP (!) 150/94  Pulse 84  Ht 5' 3" (1.6 m)  Wt 100 kg (220 lb 7.4 oz)  LMP 01/21/1973 (Approximate)  BMI 39.05 kg/m2  Body mass index is 39.05 kg/(m^2).    Physical Exam:  General Appearance: Well appearing in no acute distress, seated in wheelchair  Head:   Normocephalic, without obvious abnormality  Eyes:    No scleral icterus, EOMI  ENT: Neck supple, Lips, mucosa, and tongue normal; teeth and gums normal  Lungs: CTA bilaterally in anterior and posterior fields, no wheezes, no crackles.  Heart:  Regular rate and rhythm, S1, S2 normal, no murmurs heard  Abdomen: Medium sized 5cm reducible hernia below umbilicus  Extremities: 2+ pulses, no clubbing, cyanosis or edema  Skin: No rash  Neurologic: CN II-XII intact      Labs:  Lab Results   Component Value Date    WBC 11.4 (H) 03/22/2017    HGB 14.9 03/22/2017    HCT 44.5 03/22/2017     03/22/2017    CHOL 220 (H) 09/28/2016    TRIG 104 09/28/2016    HDL 75 09/28/2016    ALT 17 03/22/2017    AST 17 03/22/2017     03/22/2017    K 4.5 03/22/2017     03/22/2017    CREATININE 0.80 03/22/2017    BUN 20 03/22/2017    CO2 25 03/22/2017    TSH 0.90 01/17/2017    INR 1.2 (H) 01/31/2017    HGBA1C 5.9 01/21/2016       Imaging:  CT - granuloma dome of liver  MRI - 1/15 - wnl  U/S - 9/14  U/S 11/15 - wnl  UGI series - 2/15  CTA 2015 -         Endoscopy:    8/14 - EGD/Colonoscopy - no report, apparently normal  EGD 2016 - wnl    Assessment:  1. Umbilical hernia without obstruction and without gangrene           Recommendations:  1. Continue protonix (less interaction with plavix)  2. Refer to general surgery as her pain is localized around her hernia site seen on CT scan from 2015 and palpated on exam today  3. Refill tramadol prn    No Follow-up " on file.      Order summary:  Orders Placed This Encounter    Ambulatory consult to General Surgery    tramadol (ULTRAM) 50 mg tablet       Thank you so much for allowing me to participate in the care of Adriana Kemp MD

## 2017-04-19 NOTE — MR AVS SNAPSHOT
[facility]  [address]  [facility] [address]  Phone: [phone]  Fax: [phone]                  Adriana Strong   2017 11:30 AM   Office Visit    Description:  Female : 1945   Provider:  Efren Kemp MD   Department:  [facility]           Reason for Visit     Abdominal Pain     Gastroesophageal Reflux     Diarrhea           Diagnoses this Visit        Comments    Umbilical hernia without obstruction and without gangrene    -  Primary            To Do List           Future Appointments        Provider Department Dept Phone    2017 9:00 AM MD [facility] - General [facility] [phone]      Goals (5 Years of Data)     None       These Medications        Disp Refills Start End    tramadol (ULTRAM) 50 mg tablet 60 tablet 3 2017     Take 1 tablet (50 mg total) by mouth every 6 (six) hours as needed for Pain. - Oral    Pharmacy: [facility]/pharmacy #[id number] - [address] AT [facility] Ph #: [phone]         [facility]     [facility] Care Line -  Assistance  Unless otherwise directed by your provider, please contact [facility], our nurse care line that is available for  assistance.     Registered nurses in the [facility] provide: appointment scheduling, clinical advisement, health education, and other advisory services.  Call: [phone] (toll free)               Medications           Message regarding Medications     Verify the changes and/or additions to your medication regime listed below are the same as discussed with your clinician today.  If any of these changes or additions are incorrect, please notify your healthcare provider.        START taking these NEW medications        Refills    tramadol (ULTRAM) 50 mg tablet 3    Sig: Take 1 tablet (50 mg total) by mouth every 6 (six) hours as needed for Pain.    Class: Normal    Route: Oral       STOP taking these medications     PROAIR HFA 90 mcg/actuation inhaler            Verify that the below list of medications is an accurate representation of the medications you are currently taking.  If none reported, the list may be blank. If incorrect, please contact your healthcare provider. Carry this list with you in case of emergency.           Current Medications     acetaminophen (TYLENOL ARTHRITIS) 650 MG TbSR Take 1,300 mg by mouth 2 (two) times daily.    albuterol (ACCUNEB) 1.25 mg/3 mL Nebu Take scheduled q4 for the next two days while at home then resume prn dosing    apixaban 5 mg Tab Take 1 tablet (5 mg total) by mouth 2 (two) times daily.    ascorbic acid (VITAMIN C) 500 MG tablet Take 500 mg by mouth once daily.    azelastine (ASTELIN) 137 mcg nasal spray 1 spray by Nasal route 2 (two) times daily.    calcium carbonate (OS-MICHAEL) 600 mg (1,500 mg) Tab Take 1,200 mg by mouth once daily.    clopidogrel (PLAVIX) 75 mg tablet Take 1 tablet (75 mg total) by mouth once daily.    cyanocobalamin (VITAMIN B-12) 1000 MCG tablet Take 100 mcg by mouth once daily.    digoxin (LANOXIN) 125 mcg tablet Take 1 tablet (125 mcg total) by mouth once daily.    diltiaZEM (CARDIZEM SR) 60 MG Cp12 Take 3 capsules (180 mg total) by mouth 2 (two) times daily.    ferrous sulfate 325 mg (65 mg iron) Tab tablet Take 325 mg by mouth daily with breakfast.    fish oil-omega-3 fatty acids 300-1,000 mg capsule Take 1 g by mouth once daily.     fluticasone (FLONASE) 50 mcg/actuation nasal spray 1 spray by Each Nare route once daily.    fluticasone-salmeterol 250-50 mcg/dose (ADVAIR DISKUS) 250-50 mcg/dose diskus inhaler Inhale 2 puffs into the lungs 2 (two) times daily.    FLUZONE HIGH-DOSE 2016-17, PF, 180 mcg/0.5 mL Syrg TO BE ADMINISTERED BY PHARMACIST FOR IMMUNIZATION    furosemide (LASIX) 40 MG tablet Take 1 tablet (40 mg total) by mouth once daily.    lisinopril (PRINIVIL,ZESTRIL) 20 MG tablet Take 1 tablet (20 mg total) by  "mouth once daily.    lorazepam (ATIVAN) 0.5 MG tablet Take 0.5 mg by mouth once daily.    magnesium 250 mg Tab Take 250 mg by mouth. 3 in am and 3 pm    montelukast (SINGULAIR) 10 mg tablet Take 10 mg by mouth every evening.    mupirocin (BACTROBAN) 2 % ointment USE 1 APPLICATION ON THE SKIN THREE TIMES A DAY TO FOREARM SKIN ABRASION    oxybutynin (DITROPAN-XL) 5 MG TR24     pantoprazole (PROTONIX) 40 MG tablet Take 1 tablet (40 mg total) by mouth once daily.    polyethylene glycol (GLYCOLAX) 17 gram PwPk Take 17 g by mouth once daily.    POLYSORBATE 80/GLYCERIN (REFRESH DRY EYE THERAPY OPHT) Apply to eye 2 (two) times daily.    potassium gluconate 550 mg (90 mg) Tab Take by mouth. 2 in am and 2 pm    psyllium (METAMUCIL) powder Take 1 packet by mouth 3 (three) times daily.    triamcinolone acetonide 0.1% (KENALOG) 0.1 % cream USE 1 APPLICATION ON THE SKIN TWICE A DAY BACK LESIONS    doxycycline (VIBRA-TABS) 100 MG tablet Take 1 tablet (100 mg total) by mouth every 12 (twelve) hours.    tramadol (ULTRAM) 50 mg tablet Take 1 tablet (50 mg total) by mouth every 6 (six) hours as needed for Pain.           Clinical Reference Information           Your Vitals Were     BP Pulse Height Weight Last Period BMI    150/94 84 5' 3" (1.6 m) 100 kg (220 lb 7.4 oz) 01/21/1973 (Approximate) 39.05 kg/m2      Blood Pressure          Most Recent Value    BP  (!)  150/94      Allergies as of 4/19/2017     Celebrex [Celecoxib]    Dicyclomine    Adhesive    Avelox [Moxifloxacin]    Cilostazol    Eryc [Erythromycin]    Iodine And Iodide Containing Products    Keflex [Cephalexin]    Meclomen    Meloxicam    Metoclopramide    Sulfa (Sulfonamide Antibiotics)      Immunizations Administered on Date of Encounter - 4/19/2017     None      Orders Placed During Today's Visit      Normal Orders This Visit    Ambulatory consult to General Surgery       Language Assistance Services     ATTENTION: Language assistance services are available, free of " charge. Please call [phone].      ATENCIÓN: Si habla español, tiene a jorge disposición servicios gratuitos de asistencia lingüística. Llame al [phone].     CHÚ Ý: N?u b?n nói Ti?ng Vi?t, có các d?ch v? h? tr? ngôn ng? mi?n phí dành cho b?n. G?i s? [phone].         [facility] complies with applicable Federal civil rights laws and does not discriminate on the basis of race, color, national origin, age, disability, or sex.

## 2017-05-09 ENCOUNTER — OFFICE VISIT (OUTPATIENT)
Dept: SURGERY | Facility: CLINIC | Age: 72
End: 2017-05-09
Payer: MEDICARE

## 2017-05-09 VITALS
HEART RATE: 76 BPM | BODY MASS INDEX: 38.98 KG/M2 | WEIGHT: 220 LBS | TEMPERATURE: 98 F | SYSTOLIC BLOOD PRESSURE: 146 MMHG | DIASTOLIC BLOOD PRESSURE: 82 MMHG | HEIGHT: 63 IN

## 2017-05-09 DIAGNOSIS — E78.5 HYPERLIPIDEMIA, UNSPECIFIED HYPERLIPIDEMIA TYPE: ICD-10-CM

## 2017-05-09 DIAGNOSIS — Z79.01 WARFARIN ANTICOAGULATION: ICD-10-CM

## 2017-05-09 DIAGNOSIS — K42.9 UMBILICAL HERNIA WITHOUT OBSTRUCTION AND WITHOUT GANGRENE: ICD-10-CM

## 2017-05-09 DIAGNOSIS — K43.2 INCISIONAL HERNIA, WITHOUT OBSTRUCTION OR GANGRENE: ICD-10-CM

## 2017-05-09 DIAGNOSIS — E11.9 CONTROLLED TYPE 2 DIABETES MELLITUS WITHOUT COMPLICATION, WITHOUT LONG-TERM CURRENT USE OF INSULIN: Primary | ICD-10-CM

## 2017-05-09 DIAGNOSIS — Z01.818 PREOP EXAMINATION: ICD-10-CM

## 2017-05-09 DIAGNOSIS — I48.19 PERSISTENT ATRIAL FIBRILLATION: ICD-10-CM

## 2017-05-09 PROCEDURE — 3077F SYST BP >= 140 MM HG: CPT | Mod: S$GLB,,, | Performed by: SURGERY

## 2017-05-09 PROCEDURE — 99999 PR PBB SHADOW E&M-EST. PATIENT-LVL IV: CPT | Mod: PBBFAC,,, | Performed by: SURGERY

## 2017-05-09 PROCEDURE — 99203 OFFICE O/P NEW LOW 30 MIN: CPT | Mod: S$GLB,,, | Performed by: SURGERY

## 2017-05-09 PROCEDURE — 1159F MED LIST DOCD IN RCRD: CPT | Mod: S$GLB,,, | Performed by: SURGERY

## 2017-05-09 PROCEDURE — 1160F RVW MEDS BY RX/DR IN RCRD: CPT | Mod: S$GLB,,, | Performed by: SURGERY

## 2017-05-09 PROCEDURE — 1126F AMNT PAIN NOTED NONE PRSNT: CPT | Mod: S$GLB,,, | Performed by: SURGERY

## 2017-05-09 PROCEDURE — 1157F ADVNC CARE PLAN IN RCRD: CPT | Mod: S$GLB,,, | Performed by: SURGERY

## 2017-05-09 PROCEDURE — 3079F DIAST BP 80-89 MM HG: CPT | Mod: S$GLB,,, | Performed by: SURGERY

## 2017-05-09 PROCEDURE — 4010F ACE/ARB THERAPY RXD/TAKEN: CPT | Mod: S$GLB,,, | Performed by: SURGERY

## 2017-05-09 RX ORDER — METHYLPREDNISOLONE 4 MG/1
TABLET ORAL
Refills: 0 | COMMUNITY
Start: 2017-05-05 | End: 2017-05-31 | Stop reason: ALTCHOICE

## 2017-05-09 NOTE — Clinical Note
May 9, 2017      Efren Kemp MD  [address] [facility] [address]           [facility]  [address]  [facility] [address]  Phone: [phone]          Patient: Adriana Strong   MR Number: [id number]   YOB: 1945   Date of Visit: 5/9/2017       Dear Dr. Efren Kemp:    Thank you for referring Adriana Strong to me for evaluation. Attached you will find relevant portions of my assessment and plan of care.    If you have questions, please do not hesitate to call me. I look forward to following Adriana Strong along with you.    Sincerely,    Jay Milligan MD    Enclosure  CC:  No Recipients    If you would like to receive this communication electronically, please contact [email or web address] or [phone] to request more information on [employer] Link access.    For providers and/or their staff who would like to refer a patient to [facility], please contact us through our one-stop-shop provider referral line, Jose Lozoya, at [phone].    If you feel you have received this communication in error or would no longer like to receive these types of communications, please e-mail [email or web address]

## 2017-05-09 NOTE — PROGRESS NOTES
History & Physical    SUBJECTIVE:     History of Present Illness:  Patient is a 72 y.o. female presents with umbilical hernia.  BMI is 38 with dm, hld, essential htn, atherosclerosis of the abdominal aorta and is s/p stents, hx tia, and afib on plavix.  She had diffuse abdominal pain in January and was ruled out for other sources for the pain and has had a recent vascular evaluation showing stents were ok.  The pain is worse with straining.  She is on Plavix and elequis for stents and afib.  She has had no pain for the last few weeks.    Chief Complaint   Patient presents with    Hernia       Review of patient's allergies indicates:   Allergen Reactions    Celebrex [celecoxib] Shortness Of Breath    Dicyclomine Hives    Adhesive Dermatitis    Avelox [moxifloxacin] Swelling    Cilostazol Other (See Comments)     Elevates blood pressure    Eryc [erythromycin] Other (See Comments)     unknown    Iodine and iodide containing products Hives    Keflex [cephalexin] Hives    Meclomen      rashes    Meloxicam      Ear ringing    Metoclopramide Other (See Comments)     High blood pressure    Sulfa (sulfonamide antibiotics) Itching       Current Outpatient Prescriptions   Medication Sig Dispense Refill    acetaminophen (TYLENOL ARTHRITIS) 650 MG TbSR Take 1,300 mg by mouth 2 (two) times daily.      albuterol (ACCUNEB) 1.25 mg/3 mL Nebu Take scheduled q4 for the next two days while at home then resume prn dosing 3 mL 1    apixaban 5 mg Tab Take 1 tablet (5 mg total) by mouth 2 (two) times daily. 60 tablet 11    ascorbic acid (VITAMIN C) 500 MG tablet Take 500 mg by mouth once daily.      azelastine (ASTELIN) 137 mcg nasal spray 1 spray by Nasal route 2 (two) times daily.      calcium carbonate (OS-MICHAEL) 600 mg (1,500 mg) Tab Take 1,200 mg by mouth once daily.      clopidogrel (PLAVIX) 75 mg tablet Take 1 tablet (75 mg total) by mouth once daily. 90 tablet 4    cyanocobalamin (VITAMIN B-12) 1000 MCG tablet  Take 100 mcg by mouth once daily.      digoxin (LANOXIN) 125 mcg tablet Take 1 tablet (125 mcg total) by mouth once daily. 90 tablet 3    diltiaZEM (CARDIZEM SR) 60 MG Cp12 Take 3 capsules (180 mg total) by mouth 2 (two) times daily. 180 capsule 11    doxycycline (VIBRA-TABS) 100 MG tablet Take 1 tablet (100 mg total) by mouth every 12 (twelve) hours. 5 tablet 0    ferrous sulfate 325 mg (65 mg iron) Tab tablet Take 325 mg by mouth daily with breakfast.      fish oil-omega-3 fatty acids 300-1,000 mg capsule Take 1 g by mouth once daily.       fluticasone (FLONASE) 50 mcg/actuation nasal spray 1 spray by Each Nare route once daily.      fluticasone-salmeterol 250-50 mcg/dose (ADVAIR DISKUS) 250-50 mcg/dose diskus inhaler Inhale 2 puffs into the lungs 2 (two) times daily. 14 each 6    FLUZONE HIGH-DOSE 2016-17, PF, 180 mcg/0.5 mL Syrg TO BE ADMINISTERED BY PHARMACIST FOR IMMUNIZATION  0    furosemide (LASIX) 40 MG tablet Take 1 tablet (40 mg total) by mouth once daily. 90 tablet 3    lisinopril (PRINIVIL,ZESTRIL) 20 MG tablet Take 1 tablet (20 mg total) by mouth once daily. 90 tablet 3    lorazepam (ATIVAN) 0.5 MG tablet Take 0.5 mg by mouth once daily.      magnesium 250 mg Tab Take 250 mg by mouth. 3 in am and 3 pm      methylPREDNISolone (MEDROL DOSEPACK) 4 mg tablet TAKE 6 TABLETS ON DAY 1 AS DIRECTED ON PACKAGE AND DECREASE BY 1 TAB EACH DAY FOR A TOTAL OF 6 DAYS  0    montelukast (SINGULAIR) 10 mg tablet Take 10 mg by mouth every evening.      mupirocin (BACTROBAN) 2 % ointment USE 1 APPLICATION ON THE SKIN THREE TIMES A DAY TO FOREARM SKIN ABRASION  0    oxybutynin (DITROPAN-XL) 5 MG TR24       pantoprazole (PROTONIX) 40 MG tablet Take 1 tablet (40 mg total) by mouth once daily. 90 tablet 3    polyethylene glycol (GLYCOLAX) 17 gram PwPk Take 17 g by mouth once daily.      POLYSORBATE 80/GLYCERIN (REFRESH DRY EYE THERAPY OPHT) Apply to eye 2 (two) times daily.      potassium gluconate 550 mg  (90 mg) Tab Take by mouth. 2 in am and 2 pm      psyllium (METAMUCIL) powder Take 1 packet by mouth 3 (three) times daily.      tramadol (ULTRAM) 50 mg tablet Take 1 tablet (50 mg total) by mouth every 6 (six) hours as needed for Pain. 60 tablet 3    triamcinolone acetonide 0.1% (KENALOG) 0.1 % cream USE 1 APPLICATION ON THE SKIN TWICE A DAY BACK LESIONS  0     No current facility-administered medications for this visit.        Past Medical History:   Diagnosis Date    Anticoagulant long-term use     on Plavix since May 2015    Anxiety     Arthritis     Asthma     Cataracts, bilateral     Chest pain, atypical     Colon polyp     Coronary artery disease     Diabetes mellitus     Dry eyes     Esophageal erosions     GERD (gastroesophageal reflux disease)     Heart murmur     Hemorrhoid     High cholesterol     Hypertension     Irritable bowel syndrome     Shingles 2015    TIA (transient ischemic attack)     Use of cane as ambulatory aid      Past Surgical History:   Procedure Laterality Date    ABDOMINAL SURGERY      angiocele      2007, 2014    BREAST SURGERY      left--- a lump--- no cancer    COLONOSCOPY  2014    HYSTERECTOMY  partial    1982 partial hysterectomy    left nasal polyp      left neck lymph node      nose polyp      right hip fatty mass tissue      stent to small intestine      SUPERIOR VENA CAVA ANGIOPLASTY / STENTING      TONSILLECTOMY      TOTAL ABDOMINAL HYSTERECTOMY      2014    TOTAL KNEE ARTHROPLASTY Bilateral     UPPER GASTROINTESTINAL ENDOSCOPY  2014     Family History   Problem Relation Age of Onset    Colon cancer Neg Hx     Inflammatory bowel disease Neg Hx      Social History   Substance Use Topics    Smoking status: Never Smoker    Smokeless tobacco: None    Alcohol use No        Review of Systems:  Review of Systems   Constitutional: Negative for fever and unexpected weight change.   Respiratory: Positive for cough and shortness of breath.  "Negative for chest tightness.         Recent asthma attack and is on a prednisone taper.   Cardiovascular: Positive for chest pain.        Has angina   Gastrointestinal: Negative for constipation, diarrhea, nausea and vomiting.   Genitourinary: Negative for difficulty urinating and dysuria.   Skin: Negative for rash and wound.   Neurological: Negative for seizures and headaches.   Hematological:        Has easy bruising and bleeding on blood thinners       OBJECTIVE:     Vital Signs (Most Recent)  Temp: 98.2 °F (36.8 °C) (05/09/17 0901)  Pulse: 76 (05/09/17 0901)  BP: (!) 146/82 (05/09/17 0901)  5' 3" (1.6 m)  99.8 kg (220 lb)     Physical Exam:  Physical Exam   Constitutional: She is oriented to person, place, and time. She appears well-developed.   Cardiovascular: Normal rate, regular rhythm and normal heart sounds.    Pulmonary/Chest: Effort normal. She has rales.   Abdominal: Soft. Bowel sounds are normal.       Neurological: She is alert and oriented to person, place, and time.   Skin: Skin is warm and dry.   Psychiatric: She has a normal mood and affect. Her behavior is normal. Judgment and thought content normal.   Vitals reviewed.      Laboratory  CBC: Reviewed  CMP: Reviewed    Diagnostic Results:  CT: Reviewed  - epigastric hernia, just above the navel    ASSESSMENT/PLAN:     Epigastric and incarcerated incisional hernia with abdominal pain.  Asthma on prednisone taper.  On blood thinners for afib and mesenteric stents.    PLAN:Plan     Will get pulmonary, cardiology and vascular clearance and see if it is ok to be off blood thinners for the procedure.  Anesthesia consult.  For laparoscopic hernia repairs with mesh, 23 hour stay.  Awaiting improvement in asthma prior to proceeding.       "

## 2017-05-09 NOTE — LETTER
[facility] - General Surgery  [address]  [place] [address]  Phone: [phone] May 9, 2017      Efren Kemp MD  [address]  [facility] [address]    Patient: Adriana Strong   MR Number: [id number]   YOB: 1945   Date of Visit: 5/9/2017     Dear Dr. Kemp:    Thank you for referring Adriana Strong to me for evaluation. Below are the relevant portions of my assessment and plan of care.    Epigastric and incarcerated incisional hernia with abdominal pain. Asthma on prednisone taper. On blood thinners for afib and mesenteric stents.     PLAN: Will get pulmonary, cardiology and vascular clearance and see if it is ok to be off blood thinners for the procedure. Anesthesia consult. For laparoscopic hernia repairs with mesh, 23 hour stay. Awaiting improvement in asthma prior to proceeding.    If you have questions, please do not hesitate to call me. I look forward to following Adriana along with you.    Sincerely,      Jay Milligan MD   Section Head - General, Laparoscopic, Bariatric  Acute Care and Oncologic Surgery   - Surgical Weight Loss Program  [facility]    WSR/rjk    CC  MD Zelalem Baca MD Gregory R. Vorhoff, MD Escipion Pedroza, MD Taylor A. Smith, MD

## 2017-05-28 ENCOUNTER — HOSPITAL ENCOUNTER (INPATIENT)
Facility: HOSPITAL | Age: 72
LOS: 1 days | Discharge: HOME | DRG: 641 | End: 2017-05-29
Attending: EMERGENCY MEDICINE | Admitting: SURGERY
Payer: MEDICARE

## 2017-05-28 DIAGNOSIS — E87.20 LACTIC ACIDOSIS: Primary | ICD-10-CM

## 2017-05-28 DIAGNOSIS — N39.0 URINARY TRACT INFECTION WITHOUT HEMATURIA, SITE UNSPECIFIED: ICD-10-CM

## 2017-05-28 DIAGNOSIS — I48.91 A-FIB: ICD-10-CM

## 2017-05-28 DIAGNOSIS — N30.00 ACUTE CYSTITIS WITHOUT HEMATURIA: ICD-10-CM

## 2017-05-28 DIAGNOSIS — R10.9 ABDOMINAL PAIN, UNSPECIFIED LOCATION: ICD-10-CM

## 2017-05-28 DIAGNOSIS — R10.9 ABDOMINAL PAIN: ICD-10-CM

## 2017-05-28 LAB
BACTERIA #/AREA URNS AUTO: ABNORMAL /HPF
BILIRUB UR QL STRIP: NEGATIVE
CLARITY UR REFRACT.AUTO: ABNORMAL
COLOR UR AUTO: YELLOW
GLUCOSE UR QL STRIP: NEGATIVE
HGB UR QL STRIP: NEGATIVE
HYALINE CASTS UR QL AUTO: 0 /LPF
INR PPP: 1
KETONES UR QL STRIP: NEGATIVE
LACTATE SERPL-SCNC: 1.1 MMOL/L
LACTATE SERPL-SCNC: 3.5 MMOL/L
LEUKOCYTE ESTERASE UR QL STRIP: ABNORMAL
MICROSCOPIC COMMENT: ABNORMAL
NITRITE UR QL STRIP: NEGATIVE
PH UR STRIP: 7 [PH] (ref 5–8)
PROT UR QL STRIP: ABNORMAL
PROTHROMBIN TIME: 10.7 SEC
RBC #/AREA URNS AUTO: 1 /HPF (ref 0–4)
SP GR UR STRIP: 1.01 (ref 1–1.03)
SQUAMOUS #/AREA URNS AUTO: 3 /HPF
URN SPEC COLLECT METH UR: ABNORMAL
UROBILINOGEN UR STRIP-ACNC: NEGATIVE EU/DL
WBC #/AREA URNS AUTO: 23 /HPF (ref 0–5)

## 2017-05-28 PROCEDURE — 87086 URINE CULTURE/COLONY COUNT: CPT

## 2017-05-28 PROCEDURE — 81001 URINALYSIS AUTO W/SCOPE: CPT

## 2017-05-28 PROCEDURE — 96375 TX/PRO/DX INJ NEW DRUG ADDON: CPT

## 2017-05-28 PROCEDURE — 99285 EMERGENCY DEPT VISIT HI MDM: CPT | Mod: ,,, | Performed by: EMERGENCY MEDICINE

## 2017-05-28 PROCEDURE — 83036 HEMOGLOBIN GLYCOSYLATED A1C: CPT

## 2017-05-28 PROCEDURE — 99221 1ST HOSP IP/OBS SF/LOW 40: CPT | Mod: GC,,, | Performed by: SURGERY

## 2017-05-28 PROCEDURE — 96374 THER/PROPH/DIAG INJ IV PUSH: CPT

## 2017-05-28 PROCEDURE — 87088 URINE BACTERIA CULTURE: CPT

## 2017-05-28 PROCEDURE — 87186 SC STD MICRODIL/AGAR DIL: CPT

## 2017-05-28 PROCEDURE — 80053 COMPREHEN METABOLIC PANEL: CPT

## 2017-05-28 PROCEDURE — 25000003 PHARM REV CODE 250: Performed by: PHYSICIAN ASSISTANT

## 2017-05-28 PROCEDURE — 81003 URINALYSIS AUTO W/O SCOPE: CPT

## 2017-05-28 PROCEDURE — 85610 PROTHROMBIN TIME: CPT

## 2017-05-28 PROCEDURE — 12000002 HC ACUTE/MED SURGE SEMI-PRIVATE ROOM

## 2017-05-28 PROCEDURE — 63600175 PHARM REV CODE 636 W HCPCS: Performed by: PHYSICIAN ASSISTANT

## 2017-05-28 PROCEDURE — 83605 ASSAY OF LACTIC ACID: CPT | Mod: 91

## 2017-05-28 PROCEDURE — 87077 CULTURE AEROBIC IDENTIFY: CPT

## 2017-05-28 PROCEDURE — 25500020 PHARM REV CODE 255: Performed by: EMERGENCY MEDICINE

## 2017-05-28 PROCEDURE — 99285 EMERGENCY DEPT VISIT HI MDM: CPT | Mod: 25

## 2017-05-28 PROCEDURE — 83690 ASSAY OF LIPASE: CPT

## 2017-05-28 PROCEDURE — 93010 ELECTROCARDIOGRAM REPORT: CPT | Mod: ,,, | Performed by: INTERNAL MEDICINE

## 2017-05-28 PROCEDURE — 96361 HYDRATE IV INFUSION ADD-ON: CPT

## 2017-05-28 PROCEDURE — 85025 COMPLETE CBC W/AUTO DIFF WBC: CPT

## 2017-05-28 RX ORDER — FLUTICASONE FUROATE AND VILANTEROL 100; 25 UG/1; UG/1
1 POWDER RESPIRATORY (INHALATION) DAILY
Status: DISCONTINUED | OUTPATIENT
Start: 2017-05-29 | End: 2017-05-29 | Stop reason: HOSPADM

## 2017-05-28 RX ORDER — SODIUM CHLORIDE, SODIUM LACTATE, POTASSIUM CHLORIDE, CALCIUM CHLORIDE 600; 310; 30; 20 MG/100ML; MG/100ML; MG/100ML; MG/100ML
INJECTION, SOLUTION INTRAVENOUS CONTINUOUS
Status: DISCONTINUED | OUTPATIENT
Start: 2017-05-29 | End: 2017-05-29

## 2017-05-28 RX ORDER — DIPHENHYDRAMINE HYDROCHLORIDE 50 MG/ML
12.5 INJECTION INTRAMUSCULAR; INTRAVENOUS EVERY 4 HOURS PRN
Status: DISCONTINUED | OUTPATIENT
Start: 2017-05-29 | End: 2017-05-29 | Stop reason: HOSPADM

## 2017-05-28 RX ORDER — DOXYCYCLINE HYCLATE 100 MG
100 TABLET ORAL EVERY 12 HOURS
Status: DISCONTINUED | OUTPATIENT
Start: 2017-05-29 | End: 2017-05-29 | Stop reason: HOSPADM

## 2017-05-28 RX ORDER — DIGOXIN 125 MCG
125 TABLET ORAL DAILY
Status: DISCONTINUED | OUTPATIENT
Start: 2017-05-29 | End: 2017-05-29 | Stop reason: HOSPADM

## 2017-05-28 RX ORDER — DILTIAZEM HYDROCHLORIDE 30 MG/1
120 TABLET, FILM COATED ORAL EVERY 8 HOURS
Status: DISCONTINUED | OUTPATIENT
Start: 2017-05-29 | End: 2017-05-29 | Stop reason: HOSPADM

## 2017-05-28 RX ORDER — OXYBUTYNIN CHLORIDE 5 MG/1
5 TABLET, EXTENDED RELEASE ORAL DAILY
Status: DISCONTINUED | OUTPATIENT
Start: 2017-05-29 | End: 2017-05-29 | Stop reason: HOSPADM

## 2017-05-28 RX ORDER — DIPHENHYDRAMINE HYDROCHLORIDE 50 MG/ML
50 INJECTION INTRAMUSCULAR; INTRAVENOUS
Status: DISCONTINUED | OUTPATIENT
Start: 2017-05-28 | End: 2017-05-28

## 2017-05-28 RX ORDER — ONDANSETRON 8 MG/1
8 TABLET, ORALLY DISINTEGRATING ORAL EVERY 8 HOURS PRN
Status: DISCONTINUED | OUTPATIENT
Start: 2017-05-29 | End: 2017-05-29 | Stop reason: HOSPADM

## 2017-05-28 RX ORDER — AMOXICILLIN AND CLAVULANATE POTASSIUM 875; 125 MG/1; MG/1
1 TABLET, FILM COATED ORAL
Status: COMPLETED | OUTPATIENT
Start: 2017-05-28 | End: 2017-05-28

## 2017-05-28 RX ORDER — MORPHINE SULFATE 2 MG/ML
4 INJECTION, SOLUTION INTRAMUSCULAR; INTRAVENOUS
Status: COMPLETED | OUTPATIENT
Start: 2017-05-28 | End: 2017-05-28

## 2017-05-28 RX ORDER — CLINDAMYCIN PHOSPHATE 900 MG/50ML
900 INJECTION, SOLUTION INTRAVENOUS
Status: DISCONTINUED | OUTPATIENT
Start: 2017-05-28 | End: 2017-05-29 | Stop reason: HOSPADM

## 2017-05-28 RX ORDER — ONDANSETRON 2 MG/ML
4 INJECTION INTRAMUSCULAR; INTRAVENOUS
Status: COMPLETED | OUTPATIENT
Start: 2017-05-28 | End: 2017-05-28

## 2017-05-28 RX ORDER — CLOPIDOGREL BISULFATE 75 MG/1
75 TABLET ORAL DAILY
Status: DISCONTINUED | OUTPATIENT
Start: 2017-05-29 | End: 2017-05-29 | Stop reason: HOSPADM

## 2017-05-28 RX ADMIN — ONDANSETRON 4 MG: 2 INJECTION INTRAMUSCULAR; INTRAVENOUS at 04:05

## 2017-05-28 RX ADMIN — DIPHENHYDRAMINE HYDROCHLORIDE 50 MG: 50 INJECTION, SOLUTION INTRAMUSCULAR; INTRAVENOUS at 05:05

## 2017-05-28 RX ADMIN — SODIUM CHLORIDE 1000 ML: 0.9 INJECTION, SOLUTION INTRAVENOUS at 07:05

## 2017-05-28 RX ADMIN — IOHEXOL 100 ML: 350 INJECTION, SOLUTION INTRAVENOUS at 06:05

## 2017-05-28 RX ADMIN — AMOXICILLIN AND CLAVULANATE POTASSIUM 1 TABLET: 875; 125 TABLET, FILM COATED ORAL at 10:05

## 2017-05-28 RX ADMIN — MORPHINE SULFATE 4 MG: 2 INJECTION, SOLUTION INTRAMUSCULAR; INTRAVENOUS at 04:05

## 2017-05-28 NOTE — ED TRIAGE NOTES
Pt reports abd pain and abd tenderness since this past friday. Pt also reports nausea and vomiting since Friday. Pt scheduled to have hernia sx this week.

## 2017-05-28 NOTE — ED PROVIDER NOTES
Encounter Date: 5/28/2017       History     Chief Complaint   Patient presents with    Abdominal Pain     onset  3 days getting worse.  Denies blood in stool/ emesis.     Nausea    Vomiting     Review of patient's allergies indicates:   Allergen Reactions    Celebrex [celecoxib] Shortness Of Breath    Dicyclomine Hives    Adhesive Dermatitis    Avelox [moxifloxacin] Swelling    Cilostazol Other (See Comments)     Elevates blood pressure    Eryc [erythromycin] Other (See Comments)     unknown    Iodine and iodide containing products Hives    Keflex [cephalexin] Hives    Meclomen      rashes    Meloxicam      Ear ringing    Metoclopramide Other (See Comments)     High blood pressure    Sulfa (sulfonamide antibiotics) Itching     This is a 72 year old female with a PMH of HTN, DM, CAD, A Fib with RVR and TIA, mesenteric stents on Eliquis and Plavix, GERD, esophageal erosions, umbilical hernia who presents to the ED with a chief complaint of abdominal pain. She reports intermittent generalized abdominal pain which became more constant and severe this morning. Describes the pain as 10/10 burning and cramping pain radiating into the chest and back. Associated symptoms include nausea and vomiting. She had a formed BM prior to arrival, denies black or blood stools. She completed a prednisone taper for bronchitis today. Denies fever, chills, headache, active chest pain, SOB, dysuria, diarrhea. No recent travel or sick contacts.          Past Medical History:   Diagnosis Date    Anticoagulant long-term use     on Plavix since May 2015    Anxiety     Arthritis     Asthma     Cataracts, bilateral     Chest pain, atypical     Colon polyp     Coronary artery disease     Diabetes mellitus     Dry eyes     Esophageal erosions     GERD (gastroesophageal reflux disease)     Heart murmur     Hemorrhoid     High cholesterol     Hypertension     Irritable bowel syndrome     Shingles 2015    TIA  (transient ischemic attack)     Use of cane as ambulatory aid      Past Surgical History:   Procedure Laterality Date    ABDOMINAL SURGERY      angiocele      2007, 2014    BREAST SURGERY      left--- a lump--- no cancer    COLONOSCOPY  2014    HYSTERECTOMY  partial    1982 partial hysterectomy    left nasal polyp      left neck lymph node      nose polyp      right hip fatty mass tissue      stent to small intestine      SUPERIOR VENA CAVA ANGIOPLASTY / STENTING      TONSILLECTOMY      TOTAL ABDOMINAL HYSTERECTOMY      2014    TOTAL KNEE ARTHROPLASTY Bilateral     UPPER GASTROINTESTINAL ENDOSCOPY  2014     Family History   Problem Relation Age of Onset    Colon cancer Neg Hx     Inflammatory bowel disease Neg Hx      Social History   Substance Use Topics    Smoking status: Never Smoker    Smokeless tobacco: Not on file    Alcohol use No     Review of Systems   Constitutional: Negative for chills and fever.   HENT: Negative for sore throat.    Respiratory: Negative for shortness of breath.    Cardiovascular: Positive for chest pain.   Gastrointestinal: Positive for abdominal pain, nausea and vomiting.   Genitourinary: Negative for dysuria.   Musculoskeletal: Positive for back pain.   Skin: Negative for rash.   Neurological: Negative for weakness.   Hematological: Does not bruise/bleed easily.       Physical Exam     Initial Vitals [05/28/17 1525]   BP Pulse Resp Temp SpO2   (!) 152/78 105 20 98.5 °F (36.9 °C) 99 %     Physical Exam    Constitutional: She appears well-developed and well-nourished.   HENT:   Head: Atraumatic.   Eyes: Conjunctivae and EOM are normal. Pupils are equal, round, and reactive to light.   Neck: Normal range of motion. Neck supple.   Cardiovascular: Normal rate, regular rhythm and normal heart sounds.   Pulmonary/Chest: Breath sounds normal. No respiratory distress. She has no wheezes. She has no rhonchi. She has no rales.   Abdominal: Soft. Bowel sounds are normal. There  is generalized tenderness. There is guarding. There is no CVA tenderness.   Neurological: She is alert and oriented to person, place, and time.   Skin: Skin is warm and dry. No rash noted.         ED Course   Procedures  Labs Reviewed   CBC W/ AUTO DIFFERENTIAL - Abnormal; Notable for the following:        Result Value    WBC 15.93 (*)     MCH 32.4 (*)     Gran # 13.2 (*)     Gran% 82.9 (*)     Lymph% 9.7 (*)     All other components within normal limits   COMPREHENSIVE METABOLIC PANEL - Abnormal; Notable for the following:     Sodium 133 (*)     CO2 21 (*)     Glucose 208 (*)     Alkaline Phosphatase 54 (*)     eGFR if non  55.6 (*)     All other components within normal limits   LACTIC ACID, PLASMA - Abnormal; Notable for the following:     Lactate (Lactic Acid) 3.5 (*)     All other components within normal limits   URINALYSIS, REFLEX TO URINE CULTURE - Abnormal; Notable for the following:     Appearance, UA Hazy (*)     Protein, UA 1+ (*)     Leukocytes, UA Trace (*)     All other components within normal limits   URINALYSIS MICROSCOPIC - Abnormal; Notable for the following:     WBC, UA 23 (*)     All other components within normal limits   CULTURE, URINE   LIPASE   PROTIME-INR   LACTIC ACID, PLASMA   HEMOGLOBIN A1C   HEMOGLOBIN A1C    Narrative:     add on test hemoglobin a1c per dr nasrin peters order #[id number]   05/28/2017  23:37    CBC W/ AUTO DIFFERENTIAL   COMPREHENSIVE METABOLIC PANEL   MAGNESIUM   PHOSPHORUS   LACTIC ACID, PLASMA         Imaging Results          US Mesenteric Ischemia Study (xpd) (Final result)  Result time 05/28/17 21:40:43    Final result by Alber Funes MD (05/28/17 21:40:43)                 Impression:      No evidence of hemodynamically significant stenosis.  ______________________________________     Electronically signed by resident: NOREEN HEAD MD  Date:     05/28/17  Time:    21:30            As the supervising and teaching physician, I personally  reviewed the images and resident's interpretation and I agree with the findings.          Electronically signed by: CHIN MANCERA MD  Date:     05/28/17  Time:    21:40              Narrative:    Comparison: None.    Findings:  The aorta demonstrates a normal caliber throughout. No evidence of aneurysm.  The iliac arteries are normal in caliber without evidence of aneurysm.    The celiac, superior mesenteric, and inferior mesenteric arteries are visualized.   Aortic peak systolic velocity measures 97 cm/s.  The velocity in the celiac artery during expiration is 102 cm/sec resulting in a celiac to aortic ratio of 1.1. The velocity in the celiac artery during inspiration is 96 cm/sec resulting in a celiac to aortic ratio of 1.0.   The velocity in the superior mesenteric artery is 220 cm/sec resulting in a SMA to aortic velocity 2.3.  The velocity in the inferior mesenteric artery is 157 cm/sec resulting in a LIZ to aortic ratio 1.6.    There is no evidence of hemodynamically significant stenosis.                             CT Abdomen Pelvis With Contrast (Final result)  Result time 05/28/17 19:09:57    Final result by Colby Mejia MD (05/28/17 19:09:57)                 Impression:      1. No definite CT findings are seen to explain the source of patient's abdominal pain.    2.  Aortic and coronary artery atherosclerosis as well as significant calcification of the proximal branching vessels of the abdominal aorta.  Calcification of the aortic valve.    3.  Fat-containing umbilical hernia and small fat containing ventral wall hernia.  ______________________________________     Electronically signed by resident: MAYCO HARVEY MD  Date:     05/28/17  Time:    19:05            As the supervising and teaching physician, I personally reviewed the images and resident's interpretation and I agree with the findings.          Electronically signed by: Dr. Colby Mejia MD  Date:     05/28/17  Time:    19:09               Narrative:    Procedure comments: The patient was surveyed from the lung bases through the pelvis after the administration of 100 cc Omni 350 IV contrast and data was reconstructed for coronal, sagittal, and axial images.    Comparison: CTA 11/7/2015.    Findings:  The lung bases are clear.  No pleural effusion is seen.  A prominent amount of pleural fat is seen in the lower aspect of the left hemithorax.      The visualized portions of the heart appear normal.  Coronary artery atherosclerosis is noted.  Calcification of the aortic valve is seen.    The liver is normal in size and attenuation.  A calcified granuloma is noted in the superior aspect of the right hepatic lobe.  Otherwise, no focal hepatic abnormality is seen.  The gallbladder is unremarkable.  No intrahepatic or extrahepatic biliary ductal dilatation is identified.    The stomach, spleen, pancreas, and bilateral adrenal glands appear unremarkable.    The kidneys are normal in size and location.  They enhance and excrete contrast appropriately.  No hydronephrosis is seen.  The urinary bladder is unremarkable.  The uterus is surgically absent.    The visualized loops of small and large bowel demonstrate no evidence of obstruction or inflammation, noting nondistention of the distal transverse, descending, and sigmoid colon.  The appendix is seen appears unremarkable.  No ascites, free fluid, or intraperitoneal free air is identified.    There is no evidence of lymph node enlargement in the abdomen or pelvis.    The abdominal aorta is normal in caliber and course with significant atherosclerotic calcification throughout its course and proximal branching vessels.    Fat-containing umbilical and ventral wall hernias are noted.    The osseous structures demonstrate degenerative change without evidence of acute fracture or osseous destructive process.                                   Medical Decision Making:   History:   Old Medical Records: I decided to  obtain old medical records.       APC / Resident Notes:   72 year old female presents with abdominal pain.  On exam she is afebrile and nontoxic. She is mildly tachycardic to 105 in triage. Abdomen is soft with generalized tenderness to palpation. No pulsatile masses palpated. Equal bilateral distal pulses.    DDX includes but is not limited to incarcerated hernia, mesenteric ischemia, colitis, cholecystitis, cholelithiasis, AAA, aortic dissection.    Labs demonstrate leukocytosis WBC 15.93 in setting of recent steroid use, lactate 3.5.  Urinalysis trace leukocytes, WBC 23. Culture is pending.    CT abdomen pelvis with no acute findings. There is significant atherosclerosis of the aorta and  fat containing umbilical and ventral wall hernias.    Her pain resolved with morphine and zofran. Repeat abdominal exam is benign.    I discussed this case with general surgery, appreciate consult.   IV fluids, cipro, repeat lactate and admit to surgery service. I discussed the care of this patient with my supervising MD.               ED Course     Clinical Impression:   The primary encounter diagnosis was Lactic acidosis. Diagnoses of Abdominal pain, Urinary tract infection without hematuria, site unspecified, and Abdominal pain, unspecified location were also pertinent to this visit.    Disposition:   Disposition: Admitted  Condition: Stable       JUSTUS Coley  05/29/17 0116

## 2017-05-29 VITALS
TEMPERATURE: 98 F | DIASTOLIC BLOOD PRESSURE: 75 MMHG | WEIGHT: 222 LBS | OXYGEN SATURATION: 94 % | HEIGHT: 65 IN | HEART RATE: 98 BPM | SYSTOLIC BLOOD PRESSURE: 149 MMHG | RESPIRATION RATE: 18 BRPM | BODY MASS INDEX: 36.99 KG/M2

## 2017-05-29 PROBLEM — N39.0 UTI (URINARY TRACT INFECTION): Status: ACTIVE | Noted: 2017-05-29

## 2017-05-29 LAB
ALBUMIN SERPL BCP-MCNC: 2.9 G/DL
ALBUMIN SERPL BCP-MCNC: 3.6 G/DL
ALP SERPL-CCNC: 44 U/L
ALP SERPL-CCNC: 54 U/L
ALT SERPL W/O P-5'-P-CCNC: 24 U/L
ALT SERPL W/O P-5'-P-CCNC: 31 U/L
ANION GAP SERPL CALC-SCNC: 16 MMOL/L
ANION GAP SERPL CALC-SCNC: 8 MMOL/L
AST SERPL-CCNC: 17 U/L
AST SERPL-CCNC: 31 U/L
BASOPHILS # BLD AUTO: 0.03 K/UL
BASOPHILS # BLD AUTO: 0.04 K/UL
BASOPHILS NFR BLD: 0.3 %
BASOPHILS NFR BLD: 0.3 %
BILIRUB SERPL-MCNC: 0.5 MG/DL
BILIRUB SERPL-MCNC: 0.5 MG/DL
BUN SERPL-MCNC: 14 MG/DL
BUN SERPL-MCNC: 21 MG/DL
CALCIUM SERPL-MCNC: 8.7 MG/DL
CALCIUM SERPL-MCNC: 9.5 MG/DL
CHLORIDE SERPL-SCNC: 106 MMOL/L
CHLORIDE SERPL-SCNC: 96 MMOL/L
CO2 SERPL-SCNC: 21 MMOL/L
CO2 SERPL-SCNC: 26 MMOL/L
CREAT SERPL-MCNC: 0.7 MG/DL
CREAT SERPL-MCNC: 1 MG/DL
DIFFERENTIAL METHOD: ABNORMAL
DIFFERENTIAL METHOD: ABNORMAL
EOSINOPHIL # BLD AUTO: 0 K/UL
EOSINOPHIL # BLD AUTO: 0.1 K/UL
EOSINOPHIL NFR BLD: 0 %
EOSINOPHIL NFR BLD: 0.6 %
ERYTHROCYTE [DISTWIDTH] IN BLOOD BY AUTOMATED COUNT: 13.9 %
ERYTHROCYTE [DISTWIDTH] IN BLOOD BY AUTOMATED COUNT: 14.1 %
EST. GFR  (AFRICAN AMERICAN): >60 ML/MIN/1.73 M^2
EST. GFR  (AFRICAN AMERICAN): >60 ML/MIN/1.73 M^2
EST. GFR  (NON AFRICAN AMERICAN): 55.6 ML/MIN/1.73 M^2
EST. GFR  (NON AFRICAN AMERICAN): >60 ML/MIN/1.73 M^2
ESTIMATED AVG GLUCOSE: 146 MG/DL
GLUCOSE SERPL-MCNC: 208 MG/DL
GLUCOSE SERPL-MCNC: 92 MG/DL
HBA1C MFR BLD HPLC: 6.7 %
HCT VFR BLD AUTO: 42.1 %
HCT VFR BLD AUTO: 46.4 %
HGB BLD-MCNC: 14.1 G/DL
HGB BLD-MCNC: 15.6 G/DL
LACTATE SERPL-SCNC: 1.2 MMOL/L
LIPASE SERPL-CCNC: 18 U/L
LYMPHOCYTES # BLD AUTO: 1.5 K/UL
LYMPHOCYTES # BLD AUTO: 2.6 K/UL
LYMPHOCYTES NFR BLD: 26.3 %
LYMPHOCYTES NFR BLD: 9.7 %
MAGNESIUM SERPL-MCNC: 1.7 MG/DL
MCH RBC QN AUTO: 32.4 PG
MCH RBC QN AUTO: 32.6 PG
MCHC RBC AUTO-ENTMCNC: 33.5 %
MCHC RBC AUTO-ENTMCNC: 33.6 %
MCV RBC AUTO: 97 FL
MCV RBC AUTO: 97 FL
MITRAL VALVE MOBILITY: NORMAL
MONOCYTES # BLD AUTO: 0.9 K/UL
MONOCYTES # BLD AUTO: 0.9 K/UL
MONOCYTES NFR BLD: 5.7 %
MONOCYTES NFR BLD: 8.5 %
NEUTROPHILS # BLD AUTO: 13.2 K/UL
NEUTROPHILS # BLD AUTO: 6.3 K/UL
NEUTROPHILS NFR BLD: 63 %
NEUTROPHILS NFR BLD: 82.9 %
PHOSPHATE SERPL-MCNC: 3.4 MG/DL
PLATELET # BLD AUTO: 234 K/UL
PLATELET # BLD AUTO: 297 K/UL
PMV BLD AUTO: 10.3 FL
PMV BLD AUTO: 9.6 FL
POTASSIUM SERPL-SCNC: 4.1 MMOL/L
POTASSIUM SERPL-SCNC: 4.6 MMOL/L
PROT SERPL-MCNC: 5.8 G/DL
PROT SERPL-MCNC: 7.5 G/DL
RBC # BLD AUTO: 4.33 M/UL
RBC # BLD AUTO: 4.81 M/UL
RETIRED EF AND QEF - SEE NOTES: 55 (ref 55–65)
SODIUM SERPL-SCNC: 133 MMOL/L
SODIUM SERPL-SCNC: 140 MMOL/L
WBC # BLD AUTO: 15.93 K/UL
WBC # BLD AUTO: 9.99 K/UL

## 2017-05-29 PROCEDURE — 25000003 PHARM REV CODE 250: Performed by: SURGERY

## 2017-05-29 PROCEDURE — 84100 ASSAY OF PHOSPHORUS: CPT

## 2017-05-29 PROCEDURE — 83735 ASSAY OF MAGNESIUM: CPT

## 2017-05-29 PROCEDURE — 63600175 PHARM REV CODE 636 W HCPCS: Performed by: STUDENT IN AN ORGANIZED HEALTH CARE EDUCATION/TRAINING PROGRAM

## 2017-05-29 PROCEDURE — 99232 SBSQ HOSP IP/OBS MODERATE 35: CPT | Mod: GC,,, | Performed by: SURGERY

## 2017-05-29 PROCEDURE — 36415 COLL VENOUS BLD VENIPUNCTURE: CPT

## 2017-05-29 PROCEDURE — 80053 COMPREHEN METABOLIC PANEL: CPT

## 2017-05-29 PROCEDURE — C8923 2D TTE W OR W/O FOL W/CON,CO: HCPCS

## 2017-05-29 PROCEDURE — 83605 ASSAY OF LACTIC ACID: CPT

## 2017-05-29 PROCEDURE — 25000242 PHARM REV CODE 250 ALT 637 W/ HCPCS: Performed by: SURGERY

## 2017-05-29 PROCEDURE — 85025 COMPLETE CBC W/AUTO DIFF WBC: CPT

## 2017-05-29 PROCEDURE — 93307 TTE W/O DOPPLER COMPLETE: CPT | Mod: 26,,, | Performed by: INTERNAL MEDICINE

## 2017-05-29 RX ORDER — MAGNESIUM SULFATE HEPTAHYDRATE 40 MG/ML
2 INJECTION, SOLUTION INTRAVENOUS ONCE
Status: COMPLETED | OUTPATIENT
Start: 2017-05-29 | End: 2017-05-29

## 2017-05-29 RX ORDER — DOXYCYCLINE HYCLATE 100 MG
100 TABLET ORAL EVERY 12 HOURS
Qty: 14 TABLET | Refills: 0 | Status: SHIPPED | OUTPATIENT
Start: 2017-05-29 | End: 2017-06-06

## 2017-05-29 RX ADMIN — APIXABAN 5 MG: 5 TABLET, FILM COATED ORAL at 08:05

## 2017-05-29 RX ADMIN — DOXYCYCLINE HYCLATE 100 MG: 100 TABLET, COATED ORAL at 08:05

## 2017-05-29 RX ADMIN — DILTIAZEM HYDROCHLORIDE 120 MG: 30 TABLET, FILM COATED ORAL at 05:05

## 2017-05-29 RX ADMIN — SODIUM CHLORIDE, SODIUM LACTATE, POTASSIUM CHLORIDE, AND CALCIUM CHLORIDE: .6; .31; .03; .02 INJECTION, SOLUTION INTRAVENOUS at 01:05

## 2017-05-29 RX ADMIN — DILTIAZEM HYDROCHLORIDE 120 MG: 30 TABLET, FILM COATED ORAL at 03:05

## 2017-05-29 RX ADMIN — FLUTICASONE FUROATE AND VILANTEROL TRIFENATATE 1 PUFF: 100; 25 POWDER RESPIRATORY (INHALATION) at 08:05

## 2017-05-29 RX ADMIN — DIGOXIN 125 MCG: 125 TABLET ORAL at 08:05

## 2017-05-29 RX ADMIN — MAGNESIUM SULFATE HEPTAHYDRATE 2 G: 40 INJECTION, SOLUTION INTRAVENOUS at 03:05

## 2017-05-29 RX ADMIN — CLOPIDOGREL 75 MG: 75 TABLET, FILM COATED ORAL at 08:05

## 2017-05-29 RX ADMIN — OXYBUTYNIN CHLORIDE 5 MG: 5 TABLET, EXTENDED RELEASE ORAL at 08:05

## 2017-05-29 RX ADMIN — SODIUM CHLORIDE, SODIUM LACTATE, POTASSIUM CHLORIDE, AND CALCIUM CHLORIDE 500 ML: .6; .31; .03; .02 INJECTION, SOLUTION INTRAVENOUS at 01:05

## 2017-05-29 NOTE — PROGRESS NOTES
Pt consented for contrast administration. No contraindications exist. Flushed 22g IV in left forearm. Optison administered. Pt tolerated well. IV flushed. Echo complete.

## 2017-05-29 NOTE — CONSULTS
[facility]  General Surgery  Consult Note    Inpatient consult to General Surgery  Consult performed by: SUKHJINDER MANZANO  Consult ordered by: EMILY EDDY      Reason for Consult: Hernia    Subjective:     History of Present Illness: Patient is 72 year old female with h/o of HTN, DM, CAD, A Fib, TIA, and chronic mesenteric ischemia (LIZ stents, SMA chronically out) presenting with 3 days of progressive, diffuse abdominal pain. Pain described as mixed sharp and crampy with radiation to the chest and back. Developed NBNB vomiting this morning after eating. Has 2 ventral hernias that have been present for some time. Current pain does not appear related to the hernia and they have been reducible. She is scheduled for repair with Dr. Milligan. CT showed no acute findings, stable, fat containing hernias without any signs of bowel ischemia or inflammation in the abdomen. Pain is similar to prior pain from chronic mesenteric ischemia. Had LIZ stents placed by Vascular surgery on 4/29/15 and 1/5/16. Since that time, has mild chronic abdominal pain and occasional nausea. Mesenteric US in January showed stent patency. She is maintained on Plavix and Eliquis. Denies abdominal distension, constipation, diarrhea, melena, or hematochezia. No weight loss or food fear. Last BM was today, normal. EKG showed stable, rate controled Afib. UA showed UTI. She recently completed therapy for bronchitis.     At bedside for exam, patient says pain has resolved. Denies nausea at this time.    No current facility-administered medications on file prior to encounter.      Current Outpatient Prescriptions on File Prior to Encounter   Medication Sig    acetaminophen (TYLENOL ARTHRITIS) 650 MG TbSR Take 1,300 mg by mouth 2 (two) times daily.    albuterol (ACCUNEB) 1.25 mg/3 mL Nebu Take scheduled q4 for the next two days while at home then resume prn dosing    apixaban 5 mg Tab Take 1 tablet (5 mg total) by mouth 2  (two) times daily.    ascorbic acid (VITAMIN C) 500 MG tablet Take 500 mg by mouth once daily.    azelastine (ASTELIN) 137 mcg nasal spray 1 spray by Nasal route 2 (two) times daily.    calcium carbonate (OS-MICHAEL) 600 mg (1,500 mg) Tab Take 1,200 mg by mouth once daily.    clopidogrel (PLAVIX) 75 mg tablet Take 1 tablet (75 mg total) by mouth once daily.    cyanocobalamin (VITAMIN B-12) 1000 MCG tablet Take 100 mcg by mouth once daily.    digoxin (LANOXIN) 125 mcg tablet Take 1 tablet (125 mcg total) by mouth once daily.    diltiaZEM (CARDIZEM SR) 60 MG Cp12 Take 3 capsules (180 mg total) by mouth 2 (two) times daily.    doxycycline (VIBRA-TABS) 100 MG tablet Take 1 tablet (100 mg total) by mouth every 12 (twelve) hours.    ferrous sulfate 325 mg (65 mg iron) Tab tablet Take 325 mg by mouth daily with breakfast.    fish oil-omega-3 fatty acids 300-1,000 mg capsule Take 1 g by mouth once daily.     fluticasone (FLONASE) 50 mcg/actuation nasal spray 1 spray by Each Nare route once daily.    fluticasone-salmeterol 250-50 mcg/dose (ADVAIR DISKUS) 250-50 mcg/dose diskus inhaler Inhale 2 puffs into the lungs 2 (two) times daily.    FLUZONE HIGH-DOSE 2016-17, PF, 180 mcg/0.5 mL Syrg TO BE ADMINISTERED BY PHARMACIST FOR IMMUNIZATION    furosemide (LASIX) 40 MG tablet Take 1 tablet (40 mg total) by mouth once daily.    lisinopril (PRINIVIL,ZESTRIL) 20 MG tablet Take 1 tablet (20 mg total) by mouth once daily.    lorazepam (ATIVAN) 0.5 MG tablet Take 0.5 mg by mouth once daily.    magnesium 250 mg Tab Take 250 mg by mouth. 3 in am and 3 pm    methylPREDNISolone (MEDROL DOSEPACK) 4 mg tablet TAKE 6 TABLETS ON DAY 1 AS DIRECTED ON PACKAGE AND DECREASE BY 1 TAB EACH DAY FOR A TOTAL OF 6 DAYS    montelukast (SINGULAIR) 10 mg tablet Take 10 mg by mouth every evening.    mupirocin (BACTROBAN) 2 % ointment USE 1 APPLICATION ON THE SKIN THREE TIMES A DAY TO FOREARM SKIN ABRASION    oxybutynin (DITROPAN-XL) 5 MG  TR24     pantoprazole (PROTONIX) 40 MG tablet Take 1 tablet (40 mg total) by mouth once daily.    polyethylene glycol (GLYCOLAX) 17 gram PwPk Take 17 g by mouth once daily.    POLYSORBATE 80/GLYCERIN (REFRESH DRY EYE THERAPY OPHT) Apply to eye 2 (two) times daily.    potassium gluconate 550 mg (90 mg) Tab Take by mouth. 2 in am and 2 pm    psyllium (METAMUCIL) powder Take 1 packet by mouth 3 (three) times daily.    tramadol (ULTRAM) 50 mg tablet Take 1 tablet (50 mg total) by mouth every 6 (six) hours as needed for Pain.    triamcinolone acetonide 0.1% (KENALOG) 0.1 % cream USE 1 APPLICATION ON THE SKIN TWICE A DAY BACK LESIONS       Review of patient's allergies indicates:   Allergen Reactions    Celebrex [celecoxib] Shortness Of Breath    Dicyclomine Hives    Adhesive Dermatitis    Avelox [moxifloxacin] Swelling    Cilostazol Other (See Comments)     Elevates blood pressure    Eryc [erythromycin] Other (See Comments)     unknown    Iodine and iodide containing products Hives    Keflex [cephalexin] Hives    Meclomen      rashes    Meloxicam      Ear ringing    Metoclopramide Other (See Comments)     High blood pressure    Sulfa (sulfonamide antibiotics) Itching       Past Medical History:   Diagnosis Date    Anticoagulant long-term use     on Plavix since May 2015    Anxiety     Arthritis     Asthma     Cataracts, bilateral     Chest pain, atypical     Colon polyp     Coronary artery disease     Diabetes mellitus     Dry eyes     Esophageal erosions     GERD (gastroesophageal reflux disease)     Heart murmur     Hemorrhoid     High cholesterol     Hypertension     Irritable bowel syndrome     Shingles 2015    TIA (transient ischemic attack)     Use of cane as ambulatory aid      Past Surgical History:   Procedure Laterality Date    ABDOMINAL SURGERY      angiocele      2007, 2014    BREAST SURGERY      left--- a lump--- no cancer    COLONOSCOPY  2014    HYSTERECTOMY   partial    1982 partial hysterectomy    left nasal polyp      left neck lymph node      nose polyp      right hip fatty mass tissue      stent to small intestine      SUPERIOR VENA CAVA ANGIOPLASTY / STENTING      TONSILLECTOMY      TOTAL ABDOMINAL HYSTERECTOMY      2014    TOTAL KNEE ARTHROPLASTY Bilateral     UPPER GASTROINTESTINAL ENDOSCOPY  2014     Family History     None        Social History Main Topics    Smoking status: Never Smoker    Smokeless tobacco: Not on file    Alcohol use No    Drug use: No    Sexual activity: Not on file     Review of Systems   Constitutional: Negative for appetite change, chills and fever.   Respiratory: Negative for shortness of breath.    Cardiovascular: Negative for chest pain.   Gastrointestinal: Positive for abdominal pain (resolved at this time), nausea (resolved at this time) and vomiting (resolved at this time). Negative for abdominal distention.   Genitourinary: Positive for dysuria.   Musculoskeletal: Positive for back pain.     Objective:     Vital Signs (Most Recent):  Temp: 97.7 °F (36.5 °C) (05/28/17 1758)  Pulse: 100 (05/28/17 1940)  Resp: 18 (05/28/17 1940)  BP: (!) 163/73 (05/28/17 1940)  SpO2: 97 % (05/28/17 1940) Vital Signs (24h Range):  Temp:  [97.7 °F (36.5 °C)-98.5 °F (36.9 °C)] 97.7 °F (36.5 °C)  Pulse:  [] 100  Resp:  [18-22] 18  SpO2:  [96 %-99 %] 97 %  BP: (152-163)/(73-78) 163/73     Weight: 100.7 kg (222 lb)  Body mass index is 37.52 kg/m².      Intake/Output Summary (Last 24 hours) at 05/28/17 2055  Last data filed at 05/28/17 1930   Gross per 24 hour   Intake                0 ml   Output              400 ml   Net             -400 ml       Physical Exam   Constitutional: She is oriented to person, place, and time. She appears well-developed and well-nourished.   Eyes: Pupils are equal, round, and reactive to light.   Neck: Neck supple.   Cardiovascular: Normal rate, regular rhythm and normal heart sounds.    Pulmonary/Chest:  Effort normal and breath sounds normal.   Abdominal: Soft. She exhibits no distension. There is no tenderness. There is no guarding. A hernia (reducible x2) is present.   Musculoskeletal: She exhibits no edema.   Lymphadenopathy:     She has no cervical adenopathy.   Neurological: She is alert and oriented to person, place, and time.   Skin: Skin is warm and dry.   Psychiatric: She has a normal mood and affect.       Significant Labs:  Amylase: No results for input(s): AMYLASE in the last 48 hours.  CBC:   Recent Labs  Lab 05/28/17  1636   WBC 15.93*   RBC 4.81   HGB 15.6   HCT 46.4      MCV 97   MCH 32.4*   MCHC 33.6     CMP:   Recent Labs  Lab 05/28/17  1636   *   CALCIUM 9.5   ALBUMIN 3.6   PROT 7.5   *   K 4.6   CO2 21*   CL 96   BUN 21   CREATININE 1.0   ALKPHOS 54*   ALT 31   AST 31   BILITOT 0.5     Coagulation:   Recent Labs  Lab 05/28/17  1713   INR 1.0     Lactic Acid:   Recent Labs  Lab 05/28/17  1654   LACTATE 3.5*     Lipase:   Recent Labs  Lab 05/28/17  1636   LIPASE 18       Significant Diagnostics:  CT reviewed  Stent appears patent with contrast study. No signs of intraabdominal inflammation or bowel ischemia.    Assessment/Plan:   71 yo F presenting with diffuse abdominal pain. Has reducible, fat containing ventral hernias that appear mostly unrelated to her current complaints. UA shows UTI and may explain leukocytosis. There is some concern for worsening chronic mesenteric ischemia. Does not appear acute, is free from peritonitis, and has no concerning findings on CT.   -Evaluate LIZ stents with mesenteric US. If no signs of stent compromise, will admit for IVF, bowel rest, serial abdominal exams, and treat UTI.      Oli Apple MD  General Surgery  [facility]

## 2017-05-29 NOTE — PROGRESS NOTES
[facility]  General Surgery  Progress Note    Subjective:     History of Present Illness:  Patient is 72 year old female with h/o of HTN, DM, CAD, A Fib, TIA, and chronic mesenteric ischemia (LIZ stents, SMA chronically out) presenting with 3 days of progressive, diffuse abdominal pain. Pain described as mixed sharp and crampy with radiation to the chest and back. Developed NBNB vomiting this morning after eating. Has 2 ventral hernias that have been present for some time. Current pain does not appear related to the hernia and they have been reducible. She is scheduled for repair with Dr. Milligan. CT showed no acute findings, stable, fat containing hernias without any signs of bowel ischemia or inflammation in the abdomen. Pain is similar to prior pain from chronic mesenteric ischemia. Had LIZ stents placed by Vascular surgery on 4/29/15 and 1/5/16. Since that time, has mild chronic abdominal pain and occasional nausea. Mesenteric US in January showed stent patency. She is maintained on Plavix and Eliquis. Denies abdominal distension, constipation, diarrhea, melena, or hematochezia. No weight loss or food fear. Last BM was 5/28, normal. EKG showed stable, rate controled Afib. UA showed UTI. She recently completed therapy for bronchitis.    Post-Op Info:  * No surgery found *         Interval History: No acute events overnight. Pain has improved, denies n/v. Would like to eat and go home.    Medications:  Continuous Infusions:   lactated ringers 125 mL/hr at 05/29/17 0134     Scheduled Meds:   apixaban  5 mg Oral BID    clopidogrel  75 mg Oral Daily    digoxin  125 mcg Oral Daily    diltiaZEM  120 mg Oral Q8H    doxycycline  100 mg Oral Q12H    fluticasone-vilanterol  1 puff Inhalation Daily    oxybutynin  5 mg Oral Daily     PRN Meds:clindamycin 900 MG/50 ML D5W, diphenhydrAMINE, ondansetron     Review of patient's allergies indicates:   Allergen Reactions    Celebrex [celecoxib]  Shortness Of Breath    Dicyclomine Hives    Adhesive Dermatitis    Avelox [moxifloxacin] Swelling    Cilostazol Other (See Comments)     Elevates blood pressure    Eryc [erythromycin] Other (See Comments)     unknown    Iodine and iodide containing products Hives    Keflex [cephalexin] Hives    Meclomen      rashes    Meloxicam      Ear ringing    Metoclopramide Other (See Comments)     High blood pressure    Sulfa (sulfonamide antibiotics) Itching     Objective:     Vital Signs (Most Recent):  Temp: 98.7 °F (37.1 °C) (05/29/17 0746)  Pulse: 87 (05/29/17 0746)  Resp: 12 (05/29/17 0746)  BP: (!) 153/74 (05/29/17 0746)  SpO2: (!) 94 % (05/29/17 0746) Vital Signs (24h Range):  Temp:  [97.7 °F (36.5 °C)-98.7 °F (37.1 °C)] 98.7 °F (37.1 °C)  Pulse:  [] 87  Resp:  [12-31] 12  SpO2:  [94 %-99 %] 94 %  BP: (131-169)/() 153/74     Weight: 100.7 kg (222 lb)  Body mass index is 37.52 kg/m².    Intake/Output - Last 3 Shifts       05/27 0700 - 05/28 0659 05/28 0700 - 05/29 0659 05/29 0700 - 05/30 0659    P.O.  0     I.V. (mL/kg)  429.2 (4.3)     IV Piggyback  500     Total Intake(mL/kg)  929.2 (9.2)     Urine (mL/kg/hr)  1250     Stool  0     Total Output   1250      Net   -320.8             Urine Occurrence  1 x     Stool Occurrence  0 x           Physical Exam   Constitutional: She is oriented to person, place, and time. She appears well-developed and well-nourished.   HENT:   Head: Normocephalic and atraumatic.   Eyes: Conjunctivae are normal.   Cardiovascular: Normal rate, regular rhythm and normal heart sounds.    Pulmonary/Chest: Effort normal and breath sounds normal.   Abdominal: Soft. She exhibits no distension. There is no tenderness.   Neurological: She is alert and oriented to person, place, and time.   Skin: Skin is warm and dry.       Significant Labs:  Labs pending        Assessment/Plan:     UTI (urinary tract infection)    Doxy for UTI        Lactic acidosis    NPO, if labs stable will  advance diet this am  IVF @ 125  Home meds            Debora Parks MD  General Surgery  [facility]

## 2017-05-29 NOTE — PLAN OF CARE
Problem: Patient Care Overview  Goal: Plan of Care Review  Outcome: Ongoing (interventions implemented as appropriate)  Plan of care reviewed with pt and daughter. Pt and daughter verbalized understanding. AAOx4. VSS on room air. In and out of AFib, MD aware. Denied pain. Ambulating to bathroom. Denied n/v. Tolerating NPO. Remained free from falls or injuries. Call light within reach. Will call for assistance. No distress noted. Will continue to monitor.

## 2017-05-29 NOTE — PROGRESS NOTES
Patient transported from [facility] to [facility] for abdominal u/s and 2D echo.

## 2017-05-29 NOTE — HPI
Patient is 72 year old female with h/o of HTN, DM, CAD, A Fib, TIA, and chronic mesenteric ischemia (LIZ stents, SMA chronically out) presenting with 3 days of progressive, diffuse abdominal pain. Pain described as mixed sharp and crampy with radiation to the chest and back. Developed NBNB vomiting this morning after eating. Has 2 ventral hernias that have been present for some time. Current pain does not appear related to the hernia and they have been reducible. She is scheduled for repair with Dr. Milligan. CT showed no acute findings, stable, fat containing hernias without any signs of bowel ischemia or inflammation in the abdomen. Pain is similar to prior pain from chronic mesenteric ischemia. Had LIZ stents placed by Vascular surgery on 4/29/15 and 1/5/16. Since that time, has mild chronic abdominal pain and occasional nausea. Mesenteric US in January showed stent patency. She is maintained on Plavix and Eliquis. Denies abdominal distension, constipation, diarrhea, melena, or hematochezia. No weight loss or food fear. Last BM was 5/28, normal. EKG showed stable, rate controled Afib. UA showed UTI. She recently completed therapy for bronchitis.

## 2017-05-29 NOTE — PLAN OF CARE
Problem: Patient Care Overview  Goal: Plan of Care Review  Outcome: Ongoing (interventions implemented as appropriate)  Patient A/Ox4. Care plan reviewed with patient-verbalizes understanding. VSS. Patient NPO most of day, tolerating liquids well post ultrasound. No complaints of pain. Patient on Tele-intermittent Afib,MD aware. Patient ambulates with cane. Patient remains free of falls. Spouse at bedside. Patient states no other needs at this time. WCTM.

## 2017-05-29 NOTE — SUBJECTIVE & OBJECTIVE
Interval History: No acute events overnight. Pain has improved, denies n/v. Would like to eat and go home.    Medications:  Continuous Infusions:   lactated ringers 125 mL/hr at 05/29/17 0134     Scheduled Meds:   apixaban  5 mg Oral BID    clopidogrel  75 mg Oral Daily    digoxin  125 mcg Oral Daily    diltiaZEM  120 mg Oral Q8H    doxycycline  100 mg Oral Q12H    fluticasone-vilanterol  1 puff Inhalation Daily    oxybutynin  5 mg Oral Daily     PRN Meds:clindamycin 900 MG/50 ML D5W, diphenhydrAMINE, ondansetron     Review of patient's allergies indicates:   Allergen Reactions    Celebrex [celecoxib] Shortness Of Breath    Dicyclomine Hives    Adhesive Dermatitis    Avelox [moxifloxacin] Swelling    Cilostazol Other (See Comments)     Elevates blood pressure    Eryc [erythromycin] Other (See Comments)     unknown    Iodine and iodide containing products Hives    Keflex [cephalexin] Hives    Meclomen      rashes    Meloxicam      Ear ringing    Metoclopramide Other (See Comments)     High blood pressure    Sulfa (sulfonamide antibiotics) Itching     Objective:     Vital Signs (Most Recent):  Temp: 98.7 °F (37.1 °C) (05/29/17 0746)  Pulse: 87 (05/29/17 0746)  Resp: 12 (05/29/17 0746)  BP: (!) 153/74 (05/29/17 0746)  SpO2: (!) 94 % (05/29/17 0746) Vital Signs (24h Range):  Temp:  [97.7 °F (36.5 °C)-98.7 °F (37.1 °C)] 98.7 °F (37.1 °C)  Pulse:  [] 87  Resp:  [12-31] 12  SpO2:  [94 %-99 %] 94 %  BP: (131-169)/() 153/74     Weight: 100.7 kg (222 lb)  Body mass index is 37.52 kg/m².    Intake/Output - Last 3 Shifts       05/27 0700 - 05/28 0659 05/28 0700 - 05/29 0659 05/29 0700 - 05/30 0659    P.O.  0     I.V. (mL/kg)  429.2 (4.3)     IV Piggyback  500     Total Intake(mL/kg)  929.2 (9.2)     Urine (mL/kg/hr)  1250     Stool  0     Total Output   1250      Net   -320.8             Urine Occurrence  1 x     Stool Occurrence  0 x           Physical Exam   Constitutional: She is oriented to  person, place, and time. She appears well-developed and well-nourished.   HENT:   Head: Normocephalic and atraumatic.   Eyes: Conjunctivae are normal.   Cardiovascular: Normal rate, regular rhythm and normal heart sounds.    Pulmonary/Chest: Effort normal and breath sounds normal.   Abdominal: Soft. She exhibits no distension. There is no tenderness.   Neurological: She is alert and oriented to person, place, and time.   Skin: Skin is warm and dry.       Significant Labs:  Labs pending

## 2017-05-29 NOTE — PROGRESS NOTES
Pt went into AFib. MD notified, HR has not gone over 109. MD said to notify him if it went over 120. Will continue to monitor.

## 2017-05-29 NOTE — PLAN OF CARE
Problem: Patient Care Overview  Goal: Plan of Care Review  Outcome: Outcome(s) achieved Date Met: 05/29/17  Discharge instructions discussed with patient. Verbalizes understanding. Paperwork and prescriptions given to patient. Medications reconciled. IVs d/c'd, catheters intact. Tolerated well. Patient tolerating regular (ADA) diet. Paperwork on diabetes and stroke management provided. VSS on RA. No acute distress noted. Awaiting transport.

## 2017-05-29 NOTE — ED NOTES
General surgery in room speaking to patient's family regarding POC.  Patient remains in ultrasound.

## 2017-05-29 NOTE — PLAN OF CARE
Patient is a 72 year old female admitted from home through the ER with Abdominal pain 5/28/2017.  Patient has had Mesenteric Ischemia in the past and concerning for Intestinal Ischemia.  Patient went had abdominal ultrasound and 2D echo today to further medical plan.  Discharge needs and date pending medical plan.    Patient lives with her , Anthony, who will drive her home and obtain anything she needs after discharge.  Patient given [facility] with understanding verbalized.  Will continue to follow for needs.    PCP  Krzysztof Mcgraw MD  [address] / [place] [id number] [phone]      [id number] - [place], [place] [facility] [address] [address]  Phone: [phone] Fax: [phone]      Extended Emergency Contact Information  Primary Emergency Contact: Anthony Strong  Address: [address] [place] [address] [facility]  Home Phone: [phone]  Relation: Spouse  Secondary Emergency Contact: Tammy Kemp  Address: unknown   [place]  Mobile Phone: [phone]  Relation: Daughter       05/29/17 1557   Discharge Assessment   Assessment Type Discharge Planning Assessment   Confirmed/corrected address and phone number on facesheet? Yes   Assessment information obtained from? Patient   Expected Length of Stay (days) 5   Prior to hospitilization cognitive status: Alert/Oriented   Prior to hospitalization functional status: Independent   Current cognitive status: Alert/Oriented   Current Functional Status: Independent   Arrived From home or self-care   Lives With spouse   Able to Return to Prior Arrangements yes   Is patient able to care for self after discharge? Yes   How many people do you have in your home that can help with your care after discharge? 1   Who are your caregiver(s) and their phone number(s)? hsuband   Patient's perception of discharge  disposition home or selfcare   Does the patient currently use HME? No   Equipment Currently Used at Home cane, straight   Do you have any problems affording any of your prescribed medications? No   Is the patient taking medications as prescribed? yes   Do you have any financial concerns preventing you from receiving the healthcare you need? No   Does the patient have transportation to healthcare appointments? Yes   Transportation Available family or friend will provide   Discharge Plan A Home with family   Discharge Plan B Home with family;Home Health

## 2017-05-30 NOTE — PLAN OF CARE
Patient discharged home with no needs 5/29/2017.  Will schedule hospital follow up with PCP.       05/30/17 5570   Final Note   Assessment Type Final Discharge Note   Discharge Disposition Home   Right Care Referral Info   Post Acute Recommendation No Care

## 2017-05-31 ENCOUNTER — PATIENT OUTREACH (OUTPATIENT)
Dept: ADMINISTRATIVE | Facility: CLINIC | Age: 72
End: 2017-05-31
Payer: MEDICARE

## 2017-05-31 LAB — BACTERIA UR CULT: NORMAL

## 2017-05-31 NOTE — PATIENT INSTRUCTIONS
Abdominal Pain    Abdominal pain is pain in the stomach or belly area. Everyone has this pain from time to time. In many cases it goes away on its own. But abdominal pain can sometimes be due to a serious problem, such as appendicitis. So its important to know when to seek help.  Causes of abdominal pain  There are many possible causes of abdominal pain. Common causes in adults include:  · Constipation, diarrhea, or gas  · Stomach acid flowing back up into the esophagus (acid reflux or heartburn)  · Severe acid reflux, called GERD (gastroesophageal reflux disease)  · A sore in the lining of the stomach or small intestine (peptic ulcer)  · Inflammation of the gallbladder, liver, or pancreas  · Gallstones or kidney stones  · Appendicitis   · Intestinal blockage   · An internal organ pushing through a muscle or other tissue (hernia)  · Urinary tract infections  · In women, menstrual cramps, fibroids, or endometriosis  · Inflammation or infection of the intestines  Diagnosing the cause of abdominal pain  Your healthcare provider will do a physical exam help find the cause of your pain. If needed, tests will be ordered. Belly pain has many possible causes. So it can be hard to find the reason for your pain. Giving details about your pain can help. Tell your provider where and when you feel the pain, and what makes it better or worse. Also let your provider know if you have other symptoms such as:  · Fever  · Tiredness  · Upset stomach (nausea)  · Vomiting  · Changes in bathroom habits  Treating abdominal pain  Some causes of pain need emergency medical treatment right away. These include appendicitis or a bowel blockage. Other problems can be treated with rest, fluids, or medicines. Your healthcare provider can give you specific instructions for treatment or self-care based on what is causing your pain.  If you have vomiting or diarrhea, sip water or other clear fluids. When you are ready to eat solid foods again,  start with small amounts of easy-to-digest, low-fat foods. These include apple sauce, toast, or crackers.   When to seek medical care  Call 911 or go to the hospital right away if you:  · Cant pass stool and are vomiting  · Are vomiting blood or have bloody diarrhea or black, tarry diarrhea  · Have chest, neck, or shoulder pain  · Feel like you might pass out  · Have pain in your shoulder blades with nausea  · Have sudden, severe belly pain  · Have new, severe pain unlike any you have felt before  · Have a belly that is rigid, hard, and tender to touch  Call your healthcare provider if you have:  · Pain for more than 5 days  · Bloating for more than 2 days  · Diarrhea for more than 5 days  · A fever of 100.4°F (38.0°C) or higher, or as directed by your provider  · Pain that gets worse  · Weight loss for no reason  · Continued lack of appetite  · Blood in your stool  How to prevent abdominal pain  Here are some tips to help prevent abdominal pain:  · Eat smaller amounts of food at one time.  · Avoid greasy, fried, or other high-fat foods.  · Avoid foods that give you gas.  · Exercise regularly.  · Drink plenty of fluids.  To help prevent GERD symptoms:  · Quit smoking.  · Reduce alcohol and certain foods that increase stomach acid.  · Avoid aspirin and over-the-counter pain and fever medicines (NSAIDS or nonsteroidal anti-inflammatory drugs), if possible  · Lose extra weight.  · Finish eating at least 2 hours before you go to bed or lie down.  · Raise the head of your bed.  Date Last Reviewed: 7/1/2016  © 1490-6834 [employer]. [address] [place] [address], [place] [address]. All rights reserved. This information is not intended as a substitute for professional medical care. Always follow your healthcare professional's instructions.

## 2017-06-01 ENCOUNTER — ANESTHESIA EVENT (OUTPATIENT)
Dept: SURGERY | Facility: HOSPITAL | Age: 72
End: 2017-06-01
Payer: MEDICARE

## 2017-06-01 ENCOUNTER — TELEPHONE (OUTPATIENT)
Dept: SURGERY | Facility: CLINIC | Age: 72
End: 2017-06-01

## 2017-06-01 ENCOUNTER — HOSPITAL ENCOUNTER (OUTPATIENT)
Dept: PREADMISSION TESTING | Facility: HOSPITAL | Age: 72
Discharge: HOME | End: 2017-06-01
Attending: ANESTHESIOLOGY
Payer: MEDICARE

## 2017-06-01 VITALS
RESPIRATION RATE: 18 BRPM | SYSTOLIC BLOOD PRESSURE: 144 MMHG | OXYGEN SATURATION: 97 % | DIASTOLIC BLOOD PRESSURE: 76 MMHG | WEIGHT: 223.13 LBS | HEIGHT: 64 IN | HEART RATE: 70 BPM | BODY MASS INDEX: 38.09 KG/M2 | TEMPERATURE: 98 F

## 2017-06-01 RX ORDER — ALBUTEROL SULFATE 90 UG/1
2 AEROSOL, METERED RESPIRATORY (INHALATION) EVERY 6 HOURS PRN
COMMUNITY
End: 2020-11-09

## 2017-06-01 RX ORDER — BUDESONIDE AND FORMOTEROL FUMARATE DIHYDRATE 80; 4.5 UG/1; UG/1
2 AEROSOL RESPIRATORY (INHALATION) 2 TIMES DAILY
COMMUNITY
End: 2018-01-16

## 2017-06-01 RX ORDER — FEXOFENADINE HCL 60 MG
180 TABLET ORAL EVERY MORNING
Status: ON HOLD | COMMUNITY
End: 2022-08-12 | Stop reason: SDUPTHER

## 2017-06-01 NOTE — TELEPHONE ENCOUNTER
----- Message from Crista Benavides RN sent at 6/1/2017  1:58 PM CDT -----      ----- Message -----  From: oPlo Pretty MD  Sent: 6/1/2017   1:32 PM  To: Jay Milligan MD, #    Crista, could you please call in Cipro 500 mg p.o. BID for 10 days for a UTI.  She had come in on call this pat weekend with abdominal pain which resolved, and Dr. Milligan is supposed to do elective surgery on her soon.  I will let him know as well.  [facility]    MAHENDRA Denise

## 2017-06-01 NOTE — DISCHARGE INSTRUCTIONS
Your surgery has been scheduled for:__________________________________________    You should report to:  ____[facility], located on the [place] side of the first floor of the           [facility] ([phone])  ____[facility], located on the [facility] side of the            Second floor of the [facility] ([phone])  ____3rd Floor [facility] located on the [facility] side of the [facility] [phone]  Please Note   - Tell your doctor if you take Aspirin, products containing Aspirin, herbal medications  or blood thinners, such as Coumadin, Ticlid, or Plavix.  (Consult your provider regarding holding or stopping before surgery).  - Arrange for someone to drive you home following surgery.  You will not be allowed to leave the surgical facility alone or drive yourself home following sedation and anesthesia.  Before Surgery  - Stop taking all herbal medications 14days prior to surgery  - No Motrin/Advil (Ibuprofen) 7 days before surgery  - No Aleve (Naproxen) 7 days before surgery  - Stop Taking Asprin, products containing Asprin _____days before surgery  - Stop taking blood thinners_______days before surgery  - Refrain from drinking alcoholic beverages for 24hours before and after surgery  - Stop or limit smoking _________days before surgery  Night before Surgery  - DO NOT EAT OR DRINK ANYTHING AFTER MIDNIGHT, INCLUDING GUM, HARD CANDY, MINTS, OR CHEWING TOBACCO.  - Take a shower or bath (shower is recommended).  Bathe with Hibiclens soap or an antibacterial soap from the neck down.  If not supplied by your surgeon, hibiclens soap will need to be purchased over the counter in pharmacy.  Rinse soap off thoroughly.  - Shampoo your hair with your regular shampoo  The Day of Surgery  - Take another bath or shower with hibiclens or any antibacterial soap, to reduce the chance of infection.  - Take heart and blood  pressure medications with a small sip of water, as advised by the perioperative team.  - Do not take fluid pills  - You may brush your teeth and rinse your mouth, but do not swall any additional water.   - Do not apply perfumes, powder, body lotions or deodorant on the day of surgery.  - Nail polish should be removed.  - Do not wear makeup or moisturizer  - Wear comfortable clothes, such as a button front shirt and loose fitting pants.  - Leave all jewelry, including body piercings, and valuables at home.    - Bring any devices you will neeed after surgery such as crutches or canes.  - If you have sleep apnea, please bring your CPAP machine  In the event that your physical condition changes including the onset of a cold or respiratory illness, or if you have to delay or cancel your surgery, please notify your surgeon.Anesthesia: General Anesthesia  Youre due to have surgery. During surgery, youll be given medication called anesthesia. (It is also called anesthetic.) This will keep you comfortable and pain-free. Your anesthesia provider will use general anesthesia. This sheet tells you more about it.  What is general anesthesia?     You are watched continuously during your procedure by the anesthesia provider   General anesthesia puts you into a state like deep sleep. It goes into the bloodstream (IV anesthetics), into the lungs (gas anesthetics), or both. You feel nothing during the procedure. You will not remember it. During the procedure, the anesthesia provider monitors you continuously. He or she checks your heart rate and rhythm, blood pressure, breathing, and blood oxygen.  · IV Anesthetics. IV anesthetics are given through an IV line in your arm. Theyre often given first. This is so you are asleep before a gas anesthetic is started. Some kinds of IV anesthetics relieve pain. Others relax you. Your doctor will decide which kind is best in your case.  · Gas Anesthetics. Gas anesthetics are breathed into the  lungs. They are often used to keep you asleep. They can be given through a facemask or a tube placed in your larynx or trachea (breathing tube).  ? If you have a facemask, your anesthesia provider will most likely place it over your nose and mouth while youre still awake. Youll breathe oxygen through the mask as your IV anesthetic is started. Gas anesthetic may be added through the mask.  ? If you have a tube in the larynx or trachea, it will be inserted into your throat after youre asleep.  Anesthesia tools and medications  You will likely have:  · IV anesthetics. These are put into an IV line into your bloodstream.  · Gas anesthetics. You breathe these anesthetics into your lungs, where they pass into your bloodstream.  · Pulse oximeter. This is a small clip that is attached to the end of your finger. This measures your blood oxygen level.  · Electrocardiography leads (electrodes). These are small sticky pads that are placed on your chest. They record your heart rate and rhythm.  · Blood pressure cuff. This reads your blood pressure.  Risks and possible complications  General anesthesia has some risks. These include:  · Breathing problems  · Nausea and vomiting  · Sore throat or hoarseness (usually temporary)  · Allergic reaction to the anesthetic  · Irregular heartbeat (rare)  · Cardiac arrest (rare)   Anesthesia safety  · Follow all instructions you are given for how long not to eat or drink before your procedure.  · Be sure your doctor knows what medications and drugs you take. This includes over-the-counter medications, herbs, supplements, alcohol or other drugs. You will be asked when those were last taken.  · Have an adult family member or friend drive you home after the procedure.  · For the first 24 hours after your surgery:  ? Do not drive or use heavy equipment.  ? Have a trusted family member or spouse make important decisions or sign documents.  ? Avoid alcohol.  ? Have a responsible adult stay with  you. He or she can watch for problems and help keep you safe.  Date Last Reviewed: 10/16/2014  © 7682-1428 [employer]. [address] [place] [address], [place] [address]. All rights reserved. This information is not intended as a substitute for professional medical care. Always follow your healthcare professional's instructions.

## 2017-06-01 NOTE — DISCHARGE SUMMARY
[facility]  General Surgery  Discharge Summary      Patient Name: Adriana Strong  MRN: [id number]  Admission Date: 5/28/2017  Hospital Length of Stay: 1 days  Discharge Date and Time: 5/29/2017  8:04 PM  Attending Physician: No att. providers found   Discharging Provider: Debora Parks MD  Primary Care Provider: Krzysztof Mcgraw MD    HPI:   Patient is 72 year old female with h/o of HTN, DM, CAD, A Fib, TIA, and chronic mesenteric ischemia (LIZ stents, SMA chronically out) presenting with 3 days of progressive, diffuse abdominal pain. Pain described as mixed sharp and crampy with radiation to the chest and back. Developed NBNB vomiting this morning after eating. Has 2 ventral hernias that have been present for some time. Current pain does not appear related to the hernia and they have been reducible. She is scheduled for repair with Dr. Milligan. CT showed no acute findings, stable, fat containing hernias without any signs of bowel ischemia or inflammation in the abdomen. Pain is similar to prior pain from chronic mesenteric ischemia. Had LIZ stents placed by Vascular surgery on 4/29/15 and 1/5/16. Since that time, has mild chronic abdominal pain and occasional nausea. Mesenteric US in January showed stent patency. She is maintained on Plavix and Eliquis. Denies abdominal distension, constipation, diarrhea, melena, or hematochezia. No weight loss or food fear. Last BM was 5/28, normal. EKG showed stable, rate controled Afib. UA showed UTI. She recently completed therapy for bronchitis.    * No surgery found *      Indwelling Lines/Drains at time of discharge:   Lines/Drains/Airways          No matching active lines, drains, or airways        Hospital Course: No notes on file    Consults:   Consults         Status Ordering Provider     Inpatient consult to General surgery  Once     Provider:  (Not yet assigned)    Completed BOBBI ART          Significant Diagnostic Studies:     Pending  Diagnostic Studies:     None        Final Active Diagnoses:    Diagnosis Date Noted POA    PRINCIPAL PROBLEM:  Lactic acidosis [E87.2] 05/28/2017 Yes    UTI (urinary tract infection) [N39.0] 05/29/2017 Yes      Problems Resolved During this Admission:    Diagnosis Date Noted Date Resolved POA      Discharged Condition: good    Disposition: Home or Self Care    Follow Up:  Follow-up Information     Polo Pretty MD.    Specialty:  General Surgery  Why:  As needed  Contact information:  [address]  [facility] [address] [phone]                 Patient Instructions:     Diet general     Activity as tolerated     Call MD for:  persistent nausea and vomiting or diarrhea     Call MD for:  severe uncontrolled pain       Medications:  Reconciled Home Medications:   Discharge Medication List as of 5/29/2017  6:41 PM      START taking these medications    Details   !! doxycycline (VIBRA-TABS) 100 MG tablet Take 1 tablet (100 mg total) by mouth every 12 (twelve) hours., Starting Mon 5/29/2017, Until Mon 6/5/2017, Normal       !! - Potential duplicate medications found. Please discuss with provider.      CONTINUE these medications which have NOT CHANGED    Details   acetaminophen (TYLENOL ARTHRITIS) 650 MG TbSR Take 1,300 mg by mouth 2 (two) times daily., Until Discontinued, Historical Med      albuterol (ACCUNEB) 1.25 mg/3 mL Nebu Take scheduled q4 for the next two days while at home then resume prn dosing, Print      apixaban 5 mg Tab Take 1 tablet (5 mg total) by mouth 2 (two) times daily., Starting 2/3/2017, Until Discontinued, Normal      ascorbic acid (VITAMIN C) 500 MG tablet Take 500 mg by mouth once daily., Until Discontinued, Historical Med      azelastine (ASTELIN) 137 mcg nasal spray 1 spray by Nasal route 2 (two) times daily., Until Discontinued, Historical Med      calcium carbonate (OS-MICHAEL) 600 mg (1,500 mg) Tab Take 1,200 mg by mouth once daily., Until Discontinued, Historical Med       clopidogrel (PLAVIX) 75 mg tablet Take 1 tablet (75 mg total) by mouth once daily., Starting 12/17/2015, Until Discontinued, Normal      cyanocobalamin (VITAMIN B-12) 1000 MCG tablet Take 100 mcg by mouth once daily., Until Discontinued, Historical Med      digoxin (LANOXIN) 125 mcg tablet Take 1 tablet (125 mcg total) by mouth once daily., Starting 1/13/2017, Until Sat 1/13/18, Normal      diltiaZEM (CARDIZEM SR) 60 MG Cp12 Take 3 capsules (180 mg total) by mouth 2 (two) times daily., Starting 1/6/2017, Until Sat 1/6/18, Print      fish oil-omega-3 fatty acids 300-1,000 mg capsule Take 1 g by mouth once daily. , Until Discontinued, Historical Med      fluticasone (FLONASE) 50 mcg/actuation nasal spray 1 spray by Each Nare route once daily., Until Discontinued, Historical Med      fluticasone-salmeterol 250-50 mcg/dose (ADVAIR DISKUS) 250-50 mcg/dose diskus inhaler Inhale 2 puffs into the lungs 2 (two) times daily., Starting 1/6/2017, Until Discontinued, Print      furosemide (LASIX) 40 MG tablet Take 1 tablet (40 mg total) by mouth once daily., Starting 2/3/2017, Until Sat 2/3/18, Normal      lisinopril (PRINIVIL,ZESTRIL) 20 MG tablet Take 1 tablet (20 mg total) by mouth once daily., Starting 12/9/2016, Until Sat 12/9/17, Normal      lorazepam (ATIVAN) 0.5 MG tablet Take 0.5 mg by mouth once daily., Until Discontinued, Historical Med      magnesium 250 mg Tab Take 250 mg by mouth. 3 in am and 3 pm, Until Discontinued, Historical Med      montelukast (SINGULAIR) 10 mg tablet Take 10 mg by mouth every evening., Until Discontinued, Historical Med      oxybutynin (DITROPAN-XL) 5 MG TR24 Starting 10/19/2015, Until Discontinued, Historical Med      pantoprazole (PROTONIX) 40 MG tablet Take 1 tablet (40 mg total) by mouth once daily., Starting 12/2/2015, Until Discontinued, Normal      polyethylene glycol (GLYCOLAX) 17 gram PwPk Take 17 g by mouth once daily., Until Discontinued, Historical Med      POLYSORBATE  80/GLYCERIN (REFRESH DRY EYE THERAPY OPHT) Apply to eye 2 (two) times daily., Until Discontinued, Historical Med      potassium gluconate 550 mg (90 mg) Tab Take by mouth. 2 in am and 2 pm, Until Discontinued, Historical Med      psyllium (METAMUCIL) powder Take 1 packet by mouth 3 (three) times daily., Until Discontinued, Historical Med      tramadol (ULTRAM) 50 mg tablet Take 1 tablet (50 mg total) by mouth every 6 (six) hours as needed for Pain., Starting 4/19/2017, Until Discontinued, Normal      !! doxycycline (VIBRA-TABS) 100 MG tablet Take 1 tablet (100 mg total) by mouth every 12 (twelve) hours., Starting 1/6/2017, Until Discontinued, Print      ferrous sulfate 325 mg (65 mg iron) Tab tablet Take 325 mg by mouth daily with breakfast., Until Discontinued, Historical Med      FLUZONE HIGH-DOSE 2016-17, PF, 180 mcg/0.5 mL Syrg TO BE ADMINISTERED BY PHARMACIST FOR IMMUNIZATION, Historical Med      methylPREDNISolone (MEDROL DOSEPACK) 4 mg tablet TAKE 6 TABLETS ON DAY 1 AS DIRECTED ON PACKAGE AND DECREASE BY 1 TAB EACH DAY FOR A TOTAL OF 6 DAYS, Historical Med      mupirocin (BACTROBAN) 2 % ointment USE 1 APPLICATION ON THE SKIN THREE TIMES A DAY TO FOREARM SKIN ABRASION, Historical Med      triamcinolone acetonide 0.1% (KENALOG) 0.1 % cream USE 1 APPLICATION ON THE SKIN TWICE A DAY BACK LESIONS, Historical Med       !! - Potential duplicate medications found. Please discuss with provider.            Debora Parks MD  General Surgery  [facility]

## 2017-06-01 NOTE — TELEPHONE ENCOUNTER
Notified pt of prescription that was called into her pharmacy per Dr. Pretty.       -Cipro 500mg BID [id number] called in to her preferred pharmacy.

## 2017-06-01 NOTE — ANESTHESIA PREPROCEDURE EVALUATION
"                                                                                                             06/01/2017  Adriana Strong is a 72 y.o., female.    Anesthesia Evaluation         Review of Systems  Anesthesia Hx:  History of prior surgery of interest to airway management or planning: Previous anesthesia: MAC EGD 2/22/16; mesenteric angiogram 1/5/16 with MAC.  Procedure performed at an [facility]. Denies Family Hx of Anesthesia complications.  Personal Hx of Anesthesia complications Slow To Awaken/Delayed Emergence and mild, somewhat sensitive to sedation / narcotics   Social:  Non-Smoker, No Alcohol Use    Hematology/Oncology:     Oncology Normal    -- Anemia:   EENT/Dental:   Glasses; limited vision OS   Cardiovascular:   Hypertension Denies MI. CAD   Dysrhythmias (takes Eliquis) atrial fibrillation Chronic messenteric ischemia s/p LIZ stents 4/29/15 &1/5/16; followed by vascular, Dr Joana Hernandez; takes Plavix Functional Capacity 3 METS, rides exercise bike 15 min daily, walks in store, reports occ chest pain, WHITLEY with walking  Valvular Heart Disease: Aortic Stenosis (AS), mild      Pulmonary:   COPD Asthma mild Shortness of breath Denies Sleep Apnea. Bronchitis 1/2017 in hospital x 5 days  Asthma flare 1 month ago, treated with prednisone   Renal/:   ED then admit on 5/28/17 for abd pain=UTI on current treatment to complete before surgery (d/c on 5/29)   Hepatic/GI:   GERD    Musculoskeletal:   Arthritis (states "all over")     Neurological:   TIA, (5/2015) Denies Seizures.  Pain , onset is chronic , location of abdominal , quality of aching/dull, sharp, stabbing, burning , severity is a 6    Endocrine:   Diabetes (A1C 6.7 5/28/17), type 2    Psych:   anxiety          Physical Exam  General:  Obesity    Airway/Jaw/Neck:  Airway Findings: Mouth Opening: Normal Tongue: Normal  General Airway Assessment: Adult  Jaw/Neck Findings:     Neck ROM: Normal ROM      Dental:  Dental Findings: In tact "   Chest/Lungs:  Chest/Lungs Findings: (left base coarse breath sounds) Clear to auscultation     Heart/Vascular:  Heart Findings: Rate: Normal  Rhythm: Irregularly Irregular  Heart Murmur  Systolic  Systolic Heart Murmur Description: L Upper Sternal Border  Systolic Heart Murmur Grade: Grade II        Mental Status:  Mental Status Findings:  Cooperative, Alert and Oriented       Pt was seen in POC 6/1/17; findings discussed with Dr Cancino; to be cleared by cardiology, Dr Carrington, vascular, Dr Hernandez and pulmonary-appt on 6/5/17 with Dr Grant at [facility]/Ella Mathews RN    Addendum 6/6/17 1618: phone call to pt to reinforce instructions given to her today by cardiologist, Dr Carrington, for stopping Plavix and Eliquis prior to surgery. Pt verbalized understanding of last dose for Plavix is today, 6/6/17 (x 5 days) and last dose for Eliquis is 6/9/17 (x 2 days). Pt cleared by cardiologist, Dr Carrington, whom she saw today in clinic-please refer to his notes in epic notes and EKG in media.   / Ella Mathews RN    Received outside clearance from pulmonologist, Dr Grant-to be scanned to media.     /Ella Mathews RN                                                             Anesthesia Plan  Type of Anesthesia, risks & benefits discussed:  Anesthesia Type:  general  Patient's Preference:   Intra-op Monitoring Plan: standard ASA monitors  Intra-op Monitoring Plan Comments:   Post Op Pain Control Plan: per primary service following discharge from PACU  Post Op Pain Control Plan Comments:   Induction:   IV  Beta Blocker:  Patient is not currently on a Beta-Blocker (No further documentation required).       Informed Consent: Patient representative understands risks and agrees with Anesthesia plan.  Questions answered. Anesthesia consent signed with patient representative.  ASA Score: 3     Day of Surgery Review of History & Physical:    H&P update referred to the surgeon.         Ready For Surgery From Anesthesia  Perspective.

## 2017-06-01 NOTE — TELEPHONE ENCOUNTER
Called and informed Ms. Strong that as soon as we resolve the discrepancy, the clinic would call her back.  She verbalized understanding.    Spoke to Dr. Milligan and notified him of the 2 drugs being in the same family.  He stated that he wanted to reach out to ID to determine if there is an interaction.  I called and spoke to Delmi, informing her of this, and that we may not have resolution today.  I informed her that as soon as we have resolution, that the clinic would call the pharmacy back.  The pharmacist, Delmi, verbalized understand.    ----- Message from Marco Cruz sent at 6/1/2017  3:10 PM CDT -----  Contact: [facility] - [place]  Caller said pt was prescribed Cipro and pt is allergic to Avelox which is in the same family. Pt had facial swelling with that medication. Caller wants to make sure doctor is aware of allergic reaction and if doctor wants to call in something else. Please call pharmacy at [phone]

## 2017-06-02 ENCOUNTER — TELEPHONE (OUTPATIENT)
Dept: SURGERY | Facility: CLINIC | Age: 72
End: 2017-06-02

## 2017-06-02 RX ORDER — NITROFURANTOIN MACROCRYSTALS 50 MG/1
50 CAPSULE ORAL 4 TIMES DAILY
Qty: 48 CAPSULE | Refills: 0 | Status: SHIPPED | OUTPATIENT
Start: 2017-06-02 | End: 2018-01-16

## 2017-06-02 NOTE — TELEPHONE ENCOUNTER
Left  for pt notifying her of prescription that sent to her pharmacy per Dr. Milligan.  -Asked pt to return phone call to let me know she received my msg.

## 2017-06-02 NOTE — TELEPHONE ENCOUNTER
----- Message from Jay Milligan MD sent at 6/2/2017  1:33 PM CDT -----  I have sent the medication to her pharmacy.  ----- Message -----  From: Neelam Posey MD  Sent: 6/2/2017   7:30 AM  To: Jay Milligan MD    She can have macrodantin -   Neelam  ----- Message -----  From: Jay Milligan MD  Sent: 6/1/2017   4:47 PM  To: Neelam Posey MD    This is the patient with allergies and uti.  Thanks.

## 2017-06-06 ENCOUNTER — TELEPHONE (OUTPATIENT)
Dept: SURGERY | Facility: CLINIC | Age: 72
End: 2017-06-06

## 2017-06-06 NOTE — PRE-PROCEDURE INSTRUCTIONS
6/6/17 1620:  phone call to pt to reinforce instructions given to her today by cardiologist, Dr Carrington, for stopping Plavix and Eliquis prior to surgery. Pt verbalized understanding of last dose for Plavix is today, 6/6/17 (x 5 days) and last dose for Eliquis is 6/9/17 (x 2 days).

## 2017-06-06 NOTE — TELEPHONE ENCOUNTER
Notified pt that she will need to stop her plavix 5 days prior to her procedure. Last dose being today 6/6. Pt verbalizes understanding.

## 2017-06-09 ENCOUNTER — TELEPHONE (OUTPATIENT)
Dept: SURGERY | Facility: CLINIC | Age: 72
End: 2017-06-09

## 2017-06-12 ENCOUNTER — ANESTHESIA (OUTPATIENT)
Dept: SURGERY | Facility: HOSPITAL | Age: 72
End: 2017-06-12
Payer: MEDICARE

## 2017-06-12 ENCOUNTER — HOSPITAL ENCOUNTER (OUTPATIENT)
Facility: HOSPITAL | Age: 72
Discharge: HOME | End: 2017-06-13
Attending: SURGERY | Admitting: SURGERY
Payer: MEDICARE

## 2017-06-12 DIAGNOSIS — I10 ESSENTIAL HYPERTENSION: ICD-10-CM

## 2017-06-12 DIAGNOSIS — K43.9 VENTRAL HERNIA: Primary | ICD-10-CM

## 2017-06-12 DIAGNOSIS — J44.9 CHRONIC OBSTRUCTIVE PULMONARY DISEASE, UNSPECIFIED COPD TYPE: ICD-10-CM

## 2017-06-12 DIAGNOSIS — K42.9 UMBILICAL HERNIA WITHOUT OBSTRUCTION AND WITHOUT GANGRENE: ICD-10-CM

## 2017-06-12 DIAGNOSIS — I48.91 ATRIAL FIBRILLATION WITH RVR: ICD-10-CM

## 2017-06-12 LAB
POCT GLUCOSE: 118 MG/DL (ref 70–110)
POCT GLUCOSE: 159 MG/DL (ref 70–110)

## 2017-06-12 PROCEDURE — 25000003 PHARM REV CODE 250: Performed by: STUDENT IN AN ORGANIZED HEALTH CARE EDUCATION/TRAINING PROGRAM

## 2017-06-12 PROCEDURE — 37000009 HC ANESTHESIA EA ADD 15 MINS: Performed by: SURGERY

## 2017-06-12 PROCEDURE — 36000708 HC OR TIME LEV III 1ST 15 MIN: Performed by: SURGERY

## 2017-06-12 PROCEDURE — 37000008 HC ANESTHESIA 1ST 15 MINUTES: Performed by: SURGERY

## 2017-06-12 PROCEDURE — 11000001 HC ACUTE MED/SURG PRIVATE ROOM

## 2017-06-12 PROCEDURE — 27201423 OPTIME MED/SURG SUP & DEVICES STERILE SUPPLY: Performed by: SURGERY

## 2017-06-12 PROCEDURE — D9220A PRA ANESTHESIA: Mod: ANES,,, | Performed by: ANESTHESIOLOGY

## 2017-06-12 PROCEDURE — 63600175 PHARM REV CODE 636 W HCPCS: Performed by: NURSE ANESTHETIST, CERTIFIED REGISTERED

## 2017-06-12 PROCEDURE — 49653 PR LAP VENTRAL/UMBILICAL HERNIA; INCARCERATED OR STRANGULATED: CPT | Mod: ,,, | Performed by: SURGERY

## 2017-06-12 PROCEDURE — D9220A PRA ANESTHESIA: Mod: CRNA,,, | Performed by: NURSE ANESTHETIST, CERTIFIED REGISTERED

## 2017-06-12 PROCEDURE — 25000003 PHARM REV CODE 250: Performed by: SURGERY

## 2017-06-12 PROCEDURE — 25000003 PHARM REV CODE 250: Performed by: NURSE ANESTHETIST, CERTIFIED REGISTERED

## 2017-06-12 PROCEDURE — 63600175 PHARM REV CODE 636 W HCPCS: Performed by: STUDENT IN AN ORGANIZED HEALTH CARE EDUCATION/TRAINING PROGRAM

## 2017-06-12 PROCEDURE — 94760 N-INVAS EAR/PLS OXIMETRY 1: CPT

## 2017-06-12 PROCEDURE — C1781 MESH (IMPLANTABLE): HCPCS | Performed by: SURGERY

## 2017-06-12 PROCEDURE — 71000033 HC RECOVERY, INTIAL HOUR: Performed by: SURGERY

## 2017-06-12 PROCEDURE — 82962 GLUCOSE BLOOD TEST: CPT | Performed by: SURGERY

## 2017-06-12 PROCEDURE — 25000003 PHARM REV CODE 250

## 2017-06-12 PROCEDURE — 27000221 HC OXYGEN, UP TO 24 HOURS

## 2017-06-12 PROCEDURE — 71000039 HC RECOVERY, EACH ADD'L HOUR: Performed by: SURGERY

## 2017-06-12 PROCEDURE — 36000709 HC OR TIME LEV III EA ADD 15 MIN: Performed by: SURGERY

## 2017-06-12 DEVICE — PROCEED MESH 6X6 SQUARE: Type: IMPLANTABLE DEVICE | Site: ABDOMEN | Status: FUNCTIONAL

## 2017-06-12 RX ORDER — TRAMADOL HYDROCHLORIDE 50 MG/1
50 TABLET ORAL EVERY 6 HOURS PRN
Status: DISCONTINUED | OUTPATIENT
Start: 2017-06-12 | End: 2017-06-13 | Stop reason: HOSPADM

## 2017-06-12 RX ORDER — HYDROCODONE BITARTRATE AND ACETAMINOPHEN 5; 325 MG/1; MG/1
1 TABLET ORAL EVERY 4 HOURS PRN
Status: DISCONTINUED | OUTPATIENT
Start: 2017-06-12 | End: 2017-06-13 | Stop reason: HOSPADM

## 2017-06-12 RX ORDER — PANTOPRAZOLE SODIUM 40 MG/1
40 TABLET, DELAYED RELEASE ORAL DAILY
Status: DISCONTINUED | OUTPATIENT
Start: 2017-06-12 | End: 2017-06-13 | Stop reason: HOSPADM

## 2017-06-12 RX ORDER — CLOPIDOGREL BISULFATE 75 MG/1
75 TABLET ORAL DAILY
Status: DISCONTINUED | OUTPATIENT
Start: 2017-06-13 | End: 2017-06-13 | Stop reason: HOSPADM

## 2017-06-12 RX ORDER — HYDROMORPHONE HYDROCHLORIDE 1 MG/ML
0.5 INJECTION, SOLUTION INTRAMUSCULAR; INTRAVENOUS; SUBCUTANEOUS EVERY 6 HOURS PRN
Status: DISCONTINUED | OUTPATIENT
Start: 2017-06-12 | End: 2017-06-13 | Stop reason: HOSPADM

## 2017-06-12 RX ORDER — GLYCOPYRROLATE 0.2 MG/ML
INJECTION INTRAMUSCULAR; INTRAVENOUS
Status: DISCONTINUED | OUTPATIENT
Start: 2017-06-12 | End: 2017-06-12

## 2017-06-12 RX ORDER — FENTANYL CITRATE 50 UG/ML
INJECTION, SOLUTION INTRAMUSCULAR; INTRAVENOUS
Status: DISCONTINUED | OUTPATIENT
Start: 2017-06-12 | End: 2017-06-12

## 2017-06-12 RX ORDER — BUDESONIDE AND FORMOTEROL FUMARATE DIHYDRATE 80; 4.5 UG/1; UG/1
2 AEROSOL RESPIRATORY (INHALATION) 2 TIMES DAILY
Status: DISCONTINUED | OUTPATIENT
Start: 2017-06-12 | End: 2017-06-13 | Stop reason: HOSPADM

## 2017-06-12 RX ORDER — AZELASTINE 1 MG/ML
1 SPRAY, METERED NASAL 2 TIMES DAILY
Status: DISCONTINUED | OUTPATIENT
Start: 2017-06-12 | End: 2017-06-13 | Stop reason: HOSPADM

## 2017-06-12 RX ORDER — HYDROMORPHONE HYDROCHLORIDE 1 MG/ML
0.2 INJECTION, SOLUTION INTRAMUSCULAR; INTRAVENOUS; SUBCUTANEOUS EVERY 5 MIN PRN
Status: DISCONTINUED | OUTPATIENT
Start: 2017-06-12 | End: 2017-06-12 | Stop reason: HOSPADM

## 2017-06-12 RX ORDER — POLYETHYLENE GLYCOL 3350 17 G/17G
17 POWDER, FOR SOLUTION ORAL DAILY
Status: DISCONTINUED | OUTPATIENT
Start: 2017-06-12 | End: 2017-06-13 | Stop reason: HOSPADM

## 2017-06-12 RX ORDER — OXYBUTYNIN CHLORIDE 5 MG/1
5 TABLET, EXTENDED RELEASE ORAL NIGHTLY
Status: DISCONTINUED | OUTPATIENT
Start: 2017-06-12 | End: 2017-06-13 | Stop reason: HOSPADM

## 2017-06-12 RX ORDER — ONDANSETRON 2 MG/ML
INJECTION INTRAMUSCULAR; INTRAVENOUS
Status: DISCONTINUED | OUTPATIENT
Start: 2017-06-12 | End: 2017-06-12

## 2017-06-12 RX ORDER — BUPIVACAINE HYDROCHLORIDE 2.5 MG/ML
INJECTION, SOLUTION EPIDURAL; INFILTRATION; INTRACAUDAL
Status: DISCONTINUED | OUTPATIENT
Start: 2017-06-12 | End: 2017-06-12 | Stop reason: HOSPADM

## 2017-06-12 RX ORDER — CLINDAMYCIN PHOSPHATE 900 MG/50ML
900 INJECTION, SOLUTION INTRAVENOUS
Status: COMPLETED | OUTPATIENT
Start: 2017-06-12 | End: 2017-06-12

## 2017-06-12 RX ORDER — HYDROCODONE BITARTRATE AND ACETAMINOPHEN 10; 325 MG/1; MG/1
TABLET ORAL
Status: COMPLETED
Start: 2017-06-12 | End: 2017-06-12

## 2017-06-12 RX ORDER — HYDROCODONE BITARTRATE AND ACETAMINOPHEN 10; 325 MG/1; MG/1
1 TABLET ORAL EVERY 4 HOURS PRN
Status: DISCONTINUED | OUTPATIENT
Start: 2017-06-12 | End: 2017-06-13 | Stop reason: HOSPADM

## 2017-06-12 RX ORDER — PROPOFOL 10 MG/ML
VIAL (ML) INTRAVENOUS
Status: DISCONTINUED | OUTPATIENT
Start: 2017-06-12 | End: 2017-06-12

## 2017-06-12 RX ORDER — NEOSTIGMINE METHYLSULFATE 1 MG/ML
INJECTION, SOLUTION INTRAVENOUS
Status: DISCONTINUED | OUTPATIENT
Start: 2017-06-12 | End: 2017-06-12

## 2017-06-12 RX ORDER — FLUTICASONE PROPIONATE 50 MCG
1 SPRAY, SUSPENSION (ML) NASAL EVERY MORNING
Status: DISCONTINUED | OUTPATIENT
Start: 2017-06-13 | End: 2017-06-13 | Stop reason: HOSPADM

## 2017-06-12 RX ORDER — ROCURONIUM BROMIDE 10 MG/ML
INJECTION, SOLUTION INTRAVENOUS
Status: DISCONTINUED | OUTPATIENT
Start: 2017-06-12 | End: 2017-06-12

## 2017-06-12 RX ORDER — MONTELUKAST SODIUM 10 MG/1
10 TABLET ORAL NIGHTLY
Status: DISCONTINUED | OUTPATIENT
Start: 2017-06-12 | End: 2017-06-13 | Stop reason: HOSPADM

## 2017-06-12 RX ORDER — ONDANSETRON 2 MG/ML
4 INJECTION INTRAMUSCULAR; INTRAVENOUS DAILY PRN
Status: DISCONTINUED | OUTPATIENT
Start: 2017-06-12 | End: 2017-06-12 | Stop reason: HOSPADM

## 2017-06-12 RX ORDER — MIDAZOLAM HYDROCHLORIDE 1 MG/ML
INJECTION, SOLUTION INTRAMUSCULAR; INTRAVENOUS
Status: DISCONTINUED | OUTPATIENT
Start: 2017-06-12 | End: 2017-06-12

## 2017-06-12 RX ORDER — DILTIAZEM HYDROCHLORIDE 60 MG/1
180 TABLET, FILM COATED ORAL EVERY 12 HOURS
Status: DISCONTINUED | OUTPATIENT
Start: 2017-06-12 | End: 2017-06-13 | Stop reason: HOSPADM

## 2017-06-12 RX ORDER — SODIUM CHLORIDE 0.9 % (FLUSH) 0.9 %
3 SYRINGE (ML) INJECTION
Status: DISCONTINUED | OUTPATIENT
Start: 2017-06-12 | End: 2017-06-13 | Stop reason: HOSPADM

## 2017-06-12 RX ORDER — LIDOCAINE HCL/PF 100 MG/5ML
SYRINGE (ML) INTRAVENOUS
Status: DISCONTINUED | OUTPATIENT
Start: 2017-06-12 | End: 2017-06-12

## 2017-06-12 RX ORDER — HYDROMORPHONE HYDROCHLORIDE 1 MG/ML
INJECTION, SOLUTION INTRAMUSCULAR; INTRAVENOUS; SUBCUTANEOUS
Status: DISPENSED
Start: 2017-06-12 | End: 2017-06-13

## 2017-06-12 RX ORDER — BACITRACIN 50000 [IU]/1
INJECTION, POWDER, FOR SOLUTION INTRAMUSCULAR
Status: DISCONTINUED | OUTPATIENT
Start: 2017-06-12 | End: 2017-06-12 | Stop reason: HOSPADM

## 2017-06-12 RX ORDER — ONDANSETRON 2 MG/ML
4 INJECTION INTRAMUSCULAR; INTRAVENOUS EVERY 8 HOURS PRN
Status: DISCONTINUED | OUTPATIENT
Start: 2017-06-12 | End: 2017-06-13 | Stop reason: HOSPADM

## 2017-06-12 RX ORDER — ALBUTEROL SULFATE 90 UG/1
2 AEROSOL, METERED RESPIRATORY (INHALATION) EVERY 6 HOURS PRN
Status: DISCONTINUED | OUTPATIENT
Start: 2017-06-12 | End: 2017-06-13 | Stop reason: HOSPADM

## 2017-06-12 RX ORDER — SODIUM CHLORIDE 9 MG/ML
INJECTION, SOLUTION INTRAVENOUS CONTINUOUS
Status: DISCONTINUED | OUTPATIENT
Start: 2017-06-12 | End: 2017-06-12

## 2017-06-12 RX ORDER — DIGOXIN 125 MCG
125 TABLET ORAL DAILY
Status: DISCONTINUED | OUTPATIENT
Start: 2017-06-13 | End: 2017-06-13 | Stop reason: HOSPADM

## 2017-06-12 RX ORDER — SODIUM CHLORIDE 9 MG/ML
INJECTION, SOLUTION INTRAVENOUS CONTINUOUS
Status: DISCONTINUED | OUTPATIENT
Start: 2017-06-12 | End: 2017-06-13 | Stop reason: HOSPADM

## 2017-06-12 RX ADMIN — SODIUM CHLORIDE: 0.9 INJECTION, SOLUTION INTRAVENOUS at 03:06

## 2017-06-12 RX ADMIN — FENTANYL CITRATE 50 MCG: 50 INJECTION, SOLUTION INTRAMUSCULAR; INTRAVENOUS at 01:06

## 2017-06-12 RX ADMIN — PROPOFOL 150 MG: 10 INJECTION, EMULSION INTRAVENOUS at 01:06

## 2017-06-12 RX ADMIN — ONDANSETRON 4 MG: 2 INJECTION INTRAMUSCULAR; INTRAVENOUS at 01:06

## 2017-06-12 RX ADMIN — MIDAZOLAM HYDROCHLORIDE 2 MG: 1 INJECTION, SOLUTION INTRAMUSCULAR; INTRAVENOUS at 01:06

## 2017-06-12 RX ADMIN — HYDROCODONE BITARTRATE AND ACETAMINOPHEN 1 TABLET: 10; 325 TABLET ORAL at 02:06

## 2017-06-12 RX ADMIN — GLYCOPYRROLATE 0.6 MG: 0.2 INJECTION, SOLUTION INTRAMUSCULAR; INTRAVENOUS at 02:06

## 2017-06-12 RX ADMIN — HYDROCODONE BITARTRATE AND ACETAMINOPHEN 1 TABLET: 10; 325 TABLET ORAL at 07:06

## 2017-06-12 RX ADMIN — FENTANYL CITRATE 50 MCG: 50 INJECTION, SOLUTION INTRAMUSCULAR; INTRAVENOUS at 02:06

## 2017-06-12 RX ADMIN — NEOSTIGMINE METHYLSULFATE 5 MG: 1 INJECTION INTRAVENOUS at 02:06

## 2017-06-12 RX ADMIN — FENTANYL CITRATE 100 MCG: 50 INJECTION, SOLUTION INTRAMUSCULAR; INTRAVENOUS at 01:06

## 2017-06-12 RX ADMIN — SODIUM CHLORIDE: 0.9 INJECTION, SOLUTION INTRAVENOUS at 01:06

## 2017-06-12 RX ADMIN — ROCURONIUM BROMIDE 45 MG: 10 INJECTION, SOLUTION INTRAVENOUS at 01:06

## 2017-06-12 RX ADMIN — HYDROMORPHONE HYDROCHLORIDE 0.5 MG: 1 INJECTION, SOLUTION INTRAMUSCULAR; INTRAVENOUS; SUBCUTANEOUS at 05:06

## 2017-06-12 RX ADMIN — LIDOCAINE HYDROCHLORIDE 50 MG: 20 INJECTION, SOLUTION INTRAVENOUS at 01:06

## 2017-06-12 RX ADMIN — PROMETHAZINE HYDROCHLORIDE 6.25 MG: 25 INJECTION INTRAMUSCULAR; INTRAVENOUS at 05:06

## 2017-06-12 RX ADMIN — OXYBUTYNIN CHLORIDE 5 MG: 5 TABLET, EXTENDED RELEASE ORAL at 09:06

## 2017-06-12 RX ADMIN — DILTIAZEM HYDROCHLORIDE 180 MG: 60 TABLET, FILM COATED ORAL at 09:06

## 2017-06-12 RX ADMIN — CLINDAMYCIN PHOSPHATE 900 MG: 18 INJECTION, SOLUTION INTRAVENOUS at 01:06

## 2017-06-12 RX ADMIN — MONTELUKAST SODIUM 10 MG: 10 TABLET, FILM COATED ORAL at 09:06

## 2017-06-12 NOTE — ANESTHESIA RELEASE NOTE
"Anesthesia Release from PACU Note    Patient: Adriana Strong    Procedure(s) Performed: Procedure(s) (LRB):  REPAIR-HERNIA-LAPAROSCOPIC-INCISIONAL w/ mesh (N/A)    Anesthesia type: general    Post pain: Adequate analgesia    Post assessment: no apparent anesthetic complications and tolerated procedure well    Last Vitals:   Visit Vitals  BP (!) 160/73 (BP Location: Left arm, Patient Position: Lying, BP Method: Automatic)   Pulse 72   Temp 36.3 °C (97.4 °F) (Axillary)   Resp 14   Ht 5' 4" (1.626 m)   Wt 101.6 kg (224 lb)   LMP 01/21/1973 (Approximate)   SpO2 (!) 93%   Breastfeeding? No   BMI 38.45 kg/m²       Post vital signs: stable    Level of consciousness: awake, alert  and oriented    Nausea/Vomiting: no nausea/no vomiting    Complications: none    Airway Patency: patent    Respiratory: unassisted, spontaneous ventilation, nasal cannula    Cardiovascular: stable and blood pressure at baseline    Hydration: euvolemic  "

## 2017-06-12 NOTE — NURSING TRANSFER
Nursing Transfer Note      6/12/2017     Transfer To: 540[address]    Transfer via stretcher    Transfer with cardiac monitoring    Transported by pct    Medicines sent: yes    Chart send with patient: Yes    Notified: daughter

## 2017-06-12 NOTE — ANESTHESIA POSTPROCEDURE EVALUATION
"Anesthesia Post Evaluation    Patient: Adriana Strong    Procedure(s) Performed: Procedure(s) (LRB):  REPAIR-HERNIA-LAPAROSCOPIC-INCISIONAL w/ mesh (N/A)    Final Anesthesia Type: general  Patient location during evaluation: PACU  Patient participation: Yes- Able to Participate  Level of consciousness: awake and alert and awake  Post-procedure vital signs: reviewed and stable  Pain management: adequate  Airway patency: patent  PONV status at discharge: No PONV  Anesthetic complications: no      Cardiovascular status: blood pressure returned to baseline  Respiratory status: unassisted, spontaneous ventilation and nasal cannula  Hydration status: euvolemic  Follow-up not needed.        Visit Vitals  BP (!) 160/73 (BP Location: Left arm, Patient Position: Lying, BP Method: Automatic)   Pulse 72   Temp 36.3 °C (97.4 °F) (Axillary)   Resp 14   Ht 5' 4" (1.626 m)   Wt 101.6 kg (224 lb)   LMP 01/21/1973 (Approximate)   SpO2 (!) 93%   Breastfeeding? No   BMI 38.45 kg/m²       Pain/Naresh Score: Pain Assessment Performed: Yes (6/12/2017  4:22 PM)  Presence of Pain: complains of pain/discomfort (6/12/2017  4:22 PM)  Pain Rating Prior to Med Admin: 8 (6/12/2017  2:45 PM)  Naresh Score: 10 (6/12/2017  4:22 PM)      "

## 2017-06-12 NOTE — H&P (VIEW-ONLY)
[facility]  General Surgery  Consult Note    Inpatient consult to General Surgery  Consult performed by: SUKHJINDER MANZANO  Consult ordered by: EMILY EDDY      Reason for Consult: Hernia    Subjective:     History of Present Illness: Patient is 72 year old female with h/o of HTN, DM, CAD, A Fib, TIA, and chronic mesenteric ischemia (LIZ stents, SMA chronically out) presenting with 3 days of progressive, diffuse abdominal pain. Pain described as mixed sharp and crampy with radiation to the chest and back. Developed NBNB vomiting this morning after eating. Has 2 ventral hernias that have been present for some time. Current pain does not appear related to the hernia and they have been reducible. She is scheduled for repair with Dr. Milligan. CT showed no acute findings, stable, fat containing hernias without any signs of bowel ischemia or inflammation in the abdomen. Pain is similar to prior pain from chronic mesenteric ischemia. Had LIZ stents placed by Vascular surgery on 4/29/15 and 1/5/16. Since that time, has mild chronic abdominal pain and occasional nausea. Mesenteric US in January showed stent patency. She is maintained on Plavix and Eliquis. Denies abdominal distension, constipation, diarrhea, melena, or hematochezia. No weight loss or food fear. Last BM was today, normal. EKG showed stable, rate controled Afib. UA showed UTI. She recently completed therapy for bronchitis.     At bedside for exam, patient says pain has resolved. Denies nausea at this time.    No current facility-administered medications on file prior to encounter.      Current Outpatient Prescriptions on File Prior to Encounter   Medication Sig    acetaminophen (TYLENOL ARTHRITIS) 650 MG TbSR Take 1,300 mg by mouth 2 (two) times daily.    albuterol (ACCUNEB) 1.25 mg/3 mL Nebu Take scheduled q4 for the next two days while at home then resume prn dosing    apixaban 5 mg Tab Take 1 tablet (5 mg total) by mouth 2  (two) times daily.    ascorbic acid (VITAMIN C) 500 MG tablet Take 500 mg by mouth once daily.    azelastine (ASTELIN) 137 mcg nasal spray 1 spray by Nasal route 2 (two) times daily.    calcium carbonate (OS-MICHAEL) 600 mg (1,500 mg) Tab Take 1,200 mg by mouth once daily.    clopidogrel (PLAVIX) 75 mg tablet Take 1 tablet (75 mg total) by mouth once daily.    cyanocobalamin (VITAMIN B-12) 1000 MCG tablet Take 100 mcg by mouth once daily.    digoxin (LANOXIN) 125 mcg tablet Take 1 tablet (125 mcg total) by mouth once daily.    diltiaZEM (CARDIZEM SR) 60 MG Cp12 Take 3 capsules (180 mg total) by mouth 2 (two) times daily.    doxycycline (VIBRA-TABS) 100 MG tablet Take 1 tablet (100 mg total) by mouth every 12 (twelve) hours.    ferrous sulfate 325 mg (65 mg iron) Tab tablet Take 325 mg by mouth daily with breakfast.    fish oil-omega-3 fatty acids 300-1,000 mg capsule Take 1 g by mouth once daily.     fluticasone (FLONASE) 50 mcg/actuation nasal spray 1 spray by Each Nare route once daily.    fluticasone-salmeterol 250-50 mcg/dose (ADVAIR DISKUS) 250-50 mcg/dose diskus inhaler Inhale 2 puffs into the lungs 2 (two) times daily.    FLUZONE HIGH-DOSE 2016-17, PF, 180 mcg/0.5 mL Syrg TO BE ADMINISTERED BY PHARMACIST FOR IMMUNIZATION    furosemide (LASIX) 40 MG tablet Take 1 tablet (40 mg total) by mouth once daily.    lisinopril (PRINIVIL,ZESTRIL) 20 MG tablet Take 1 tablet (20 mg total) by mouth once daily.    lorazepam (ATIVAN) 0.5 MG tablet Take 0.5 mg by mouth once daily.    magnesium 250 mg Tab Take 250 mg by mouth. 3 in am and 3 pm    methylPREDNISolone (MEDROL DOSEPACK) 4 mg tablet TAKE 6 TABLETS ON DAY 1 AS DIRECTED ON PACKAGE AND DECREASE BY 1 TAB EACH DAY FOR A TOTAL OF 6 DAYS    montelukast (SINGULAIR) 10 mg tablet Take 10 mg by mouth every evening.    mupirocin (BACTROBAN) 2 % ointment USE 1 APPLICATION ON THE SKIN THREE TIMES A DAY TO FOREARM SKIN ABRASION    oxybutynin (DITROPAN-XL) 5 MG  TR24     pantoprazole (PROTONIX) 40 MG tablet Take 1 tablet (40 mg total) by mouth once daily.    polyethylene glycol (GLYCOLAX) 17 gram PwPk Take 17 g by mouth once daily.    POLYSORBATE 80/GLYCERIN (REFRESH DRY EYE THERAPY OPHT) Apply to eye 2 (two) times daily.    potassium gluconate 550 mg (90 mg) Tab Take by mouth. 2 in am and 2 pm    psyllium (METAMUCIL) powder Take 1 packet by mouth 3 (three) times daily.    tramadol (ULTRAM) 50 mg tablet Take 1 tablet (50 mg total) by mouth every 6 (six) hours as needed for Pain.    triamcinolone acetonide 0.1% (KENALOG) 0.1 % cream USE 1 APPLICATION ON THE SKIN TWICE A DAY BACK LESIONS       Review of patient's allergies indicates:   Allergen Reactions    Celebrex [celecoxib] Shortness Of Breath    Dicyclomine Hives    Adhesive Dermatitis    Avelox [moxifloxacin] Swelling    Cilostazol Other (See Comments)     Elevates blood pressure    Eryc [erythromycin] Other (See Comments)     unknown    Iodine and iodide containing products Hives    Keflex [cephalexin] Hives    Meclomen      rashes    Meloxicam      Ear ringing    Metoclopramide Other (See Comments)     High blood pressure    Sulfa (sulfonamide antibiotics) Itching       Past Medical History:   Diagnosis Date    Anticoagulant long-term use     on Plavix since May 2015    Anxiety     Arthritis     Asthma     Cataracts, bilateral     Chest pain, atypical     Colon polyp     Coronary artery disease     Diabetes mellitus     Dry eyes     Esophageal erosions     GERD (gastroesophageal reflux disease)     Heart murmur     Hemorrhoid     High cholesterol     Hypertension     Irritable bowel syndrome     Shingles 2015    TIA (transient ischemic attack)     Use of cane as ambulatory aid      Past Surgical History:   Procedure Laterality Date    ABDOMINAL SURGERY      angiocele      2007, 2014    BREAST SURGERY      left--- a lump--- no cancer    COLONOSCOPY  2014    HYSTERECTOMY   partial    1982 partial hysterectomy    left nasal polyp      left neck lymph node      nose polyp      right hip fatty mass tissue      stent to small intestine      SUPERIOR VENA CAVA ANGIOPLASTY / STENTING      TONSILLECTOMY      TOTAL ABDOMINAL HYSTERECTOMY      2014    TOTAL KNEE ARTHROPLASTY Bilateral     UPPER GASTROINTESTINAL ENDOSCOPY  2014     Family History     None        Social History Main Topics    Smoking status: Never Smoker    Smokeless tobacco: Not on file    Alcohol use No    Drug use: No    Sexual activity: Not on file     Review of Systems   Constitutional: Negative for appetite change, chills and fever.   Respiratory: Negative for shortness of breath.    Cardiovascular: Negative for chest pain.   Gastrointestinal: Positive for abdominal pain (resolved at this time), nausea (resolved at this time) and vomiting (resolved at this time). Negative for abdominal distention.   Genitourinary: Positive for dysuria.   Musculoskeletal: Positive for back pain.     Objective:     Vital Signs (Most Recent):  Temp: 97.7 °F (36.5 °C) (05/28/17 1758)  Pulse: 100 (05/28/17 1940)  Resp: 18 (05/28/17 1940)  BP: (!) 163/73 (05/28/17 1940)  SpO2: 97 % (05/28/17 1940) Vital Signs (24h Range):  Temp:  [97.7 °F (36.5 °C)-98.5 °F (36.9 °C)] 97.7 °F (36.5 °C)  Pulse:  [] 100  Resp:  [18-22] 18  SpO2:  [96 %-99 %] 97 %  BP: (152-163)/(73-78) 163/73     Weight: 100.7 kg (222 lb)  Body mass index is 37.52 kg/m².      Intake/Output Summary (Last 24 hours) at 05/28/17 2055  Last data filed at 05/28/17 1930   Gross per 24 hour   Intake                0 ml   Output              400 ml   Net             -400 ml       Physical Exam   Constitutional: She is oriented to person, place, and time. She appears well-developed and well-nourished.   Eyes: Pupils are equal, round, and reactive to light.   Neck: Neck supple.   Cardiovascular: Normal rate, regular rhythm and normal heart sounds.    Pulmonary/Chest:  Effort normal and breath sounds normal.   Abdominal: Soft. She exhibits no distension. There is no tenderness. There is no guarding. A hernia (reducible x2) is present.   Musculoskeletal: She exhibits no edema.   Lymphadenopathy:     She has no cervical adenopathy.   Neurological: She is alert and oriented to person, place, and time.   Skin: Skin is warm and dry.   Psychiatric: She has a normal mood and affect.       Significant Labs:  Amylase: No results for input(s): AMYLASE in the last 48 hours.  CBC:   Recent Labs  Lab 05/28/17  1636   WBC 15.93*   RBC 4.81   HGB 15.6   HCT 46.4      MCV 97   MCH 32.4*   MCHC 33.6     CMP:   Recent Labs  Lab 05/28/17  1636   *   CALCIUM 9.5   ALBUMIN 3.6   PROT 7.5   *   K 4.6   CO2 21*   CL 96   BUN 21   CREATININE 1.0   ALKPHOS 54*   ALT 31   AST 31   BILITOT 0.5     Coagulation:   Recent Labs  Lab 05/28/17  1713   INR 1.0     Lactic Acid:   Recent Labs  Lab 05/28/17  1654   LACTATE 3.5*     Lipase:   Recent Labs  Lab 05/28/17  1636   LIPASE 18       Significant Diagnostics:  CT reviewed  Stent appears patent with contrast study. No signs of intraabdominal inflammation or bowel ischemia.    Assessment/Plan:   71 yo F presenting with diffuse abdominal pain. Has reducible, fat containing ventral hernias that appear mostly unrelated to her current complaints. UA shows UTI and may explain leukocytosis. There is some concern for worsening chronic mesenteric ischemia. Does not appear acute, is free from peritonitis, and has no concerning findings on CT.   -Evaluate LIZ stents with mesenteric US. If no signs of stent compromise, will admit for IVF, bowel rest, serial abdominal exams, and treat UTI.      Oli Apple MD  General Surgery  [facility]

## 2017-06-12 NOTE — BRIEF OP NOTE
Operative Note       Surgery Date: 6/12/2017     Surgeon(s) and Role:     * Nevaeh Brown MD - Resident - Assisting     * Jay Milligan MD - Primary    Pre-op Diagnosis:  Umbilical hernia without obstruction and without gangrene [K42.9]    Post-op Diagnosis:  Umbilical hernia without obstruction and without gangrene [K42.9]    Procedure(s) (LRB):  REPAIR-HERNIA-LAPAROSCOPIC-INCISIONAL w/ mesh (N/A)    Anesthesia: General    Procedure in Detail/Findings:  2 hernias, one at navel and one below, small defects but the lower one had incarcerated omentum and some small bowel.  Repaired with 5 by 5 inch mesh.    Estimated Blood Loss: Minimal           Specimens     None        Implants:   Implant Name Type Inv. Item Serial No.  Lot No. LRB No. Used   PROCEED MESH 6X6 SQUARE - [id number]   PROCEED MESH 6X6 SQUARE   [employer] [id number] N/A 1              Disposition: PACU - hemodynamically stable.           Condition: Good    Attestation:  I was present and scrubbed for the entire procedure.

## 2017-06-12 NOTE — TRANSFER OF CARE
"Anesthesia Transfer of Care Note    Patient: Adriana Strong    Procedure(s) Performed: Procedure(s) (LRB):  REPAIR-HERNIA-LAPAROSCOPIC-INCISIONAL w/ mesh (N/A)    Patient location: PACU    Anesthesia Type: MAC    Transport from OR: Transported from OR on 6-10 L/min O2 by face mask with adequate spontaneous ventilation    Post pain: adequate analgesia    Post assessment: no apparent anesthetic complications and tolerated procedure well    Post vital signs: stable    Level of consciousness: awake, alert and oriented    Nausea/Vomiting: no nausea/vomiting    Complications: none    Transfer of care protocol was followed      Last vitals:   Visit Vitals  BP (!) 144/85 (BP Location: Left arm, Patient Position: Lying, BP Method: Automatic)   Pulse 66   Temp 36.1 °C (97 °F) (Axillary)   Resp 18   Ht 5' 4" (1.626 m)   Wt 101.6 kg (224 lb)   LMP 01/21/1973 (Approximate)   SpO2 100%   Breastfeeding? No   BMI 38.45 kg/m²     "

## 2017-06-12 NOTE — INTERVAL H&P NOTE
The patient has been examined and the H&P has been reviewed:    I concur with the findings and no changes have occurred since H&P was written.    Anesthesia/Surgery risks, benefits and alternative options discussed and understood by patient/family.          Active Hospital Problems    Diagnosis  POA    Ventral hernia [K43.9]  Yes      Resolved Hospital Problems    Diagnosis Date Resolved POA   No resolved problems to display.

## 2017-06-13 VITALS
DIASTOLIC BLOOD PRESSURE: 65 MMHG | HEIGHT: 64 IN | TEMPERATURE: 98 F | WEIGHT: 224 LBS | RESPIRATION RATE: 16 BRPM | SYSTOLIC BLOOD PRESSURE: 126 MMHG | OXYGEN SATURATION: 94 % | HEART RATE: 85 BPM | BODY MASS INDEX: 38.24 KG/M2

## 2017-06-13 PROBLEM — J44.9 COPD (CHRONIC OBSTRUCTIVE PULMONARY DISEASE): Status: ACTIVE | Noted: 2017-06-13

## 2017-06-13 LAB
ANION GAP SERPL CALC-SCNC: 10 MMOL/L
BASOPHILS # BLD AUTO: 0.02 K/UL
BASOPHILS NFR BLD: 0.2 %
BUN SERPL-MCNC: 14 MG/DL
CALCIUM SERPL-MCNC: 8.4 MG/DL
CHLORIDE SERPL-SCNC: 107 MMOL/L
CO2 SERPL-SCNC: 23 MMOL/L
CREAT SERPL-MCNC: 0.7 MG/DL
DIFFERENTIAL METHOD: ABNORMAL
EOSINOPHIL # BLD AUTO: 0 K/UL
EOSINOPHIL NFR BLD: 0.2 %
ERYTHROCYTE [DISTWIDTH] IN BLOOD BY AUTOMATED COUNT: 14.4 %
EST. GFR  (AFRICAN AMERICAN): >60 ML/MIN/1.73 M^2
EST. GFR  (NON AFRICAN AMERICAN): >60 ML/MIN/1.73 M^2
GLUCOSE SERPL-MCNC: 123 MG/DL
HCT VFR BLD AUTO: 38.9 %
HGB BLD-MCNC: 12.3 G/DL
LYMPHOCYTES # BLD AUTO: 1.5 K/UL
LYMPHOCYTES NFR BLD: 19 %
MAGNESIUM SERPL-MCNC: 1.6 MG/DL
MCH RBC QN AUTO: 31.3 PG
MCHC RBC AUTO-ENTMCNC: 31.6 %
MCV RBC AUTO: 99 FL
MONOCYTES # BLD AUTO: 0.8 K/UL
MONOCYTES NFR BLD: 9.7 %
NEUTROPHILS # BLD AUTO: 5.7 K/UL
NEUTROPHILS NFR BLD: 70.9 %
PHOSPHATE SERPL-MCNC: 2.9 MG/DL
PLATELET # BLD AUTO: 220 K/UL
PMV BLD AUTO: 10.1 FL
POTASSIUM SERPL-SCNC: 4.1 MMOL/L
RBC # BLD AUTO: 3.93 M/UL
SODIUM SERPL-SCNC: 140 MMOL/L
WBC # BLD AUTO: 8.05 K/UL

## 2017-06-13 PROCEDURE — 36415 COLL VENOUS BLD VENIPUNCTURE: CPT

## 2017-06-13 PROCEDURE — 97165 OT EVAL LOW COMPLEX 30 MIN: CPT

## 2017-06-13 PROCEDURE — 80048 BASIC METABOLIC PNL TOTAL CA: CPT

## 2017-06-13 PROCEDURE — G8978 MOBILITY CURRENT STATUS: HCPCS | Mod: CK

## 2017-06-13 PROCEDURE — 83735 ASSAY OF MAGNESIUM: CPT

## 2017-06-13 PROCEDURE — 97530 THERAPEUTIC ACTIVITIES: CPT

## 2017-06-13 PROCEDURE — 25000003 PHARM REV CODE 250: Performed by: SURGERY

## 2017-06-13 PROCEDURE — G8980 MOBILITY D/C STATUS: HCPCS | Mod: CK

## 2017-06-13 PROCEDURE — 97162 PT EVAL MOD COMPLEX 30 MIN: CPT

## 2017-06-13 PROCEDURE — 85025 COMPLETE CBC W/AUTO DIFF WBC: CPT

## 2017-06-13 PROCEDURE — G8979 MOBILITY GOAL STATUS: HCPCS | Mod: CJ

## 2017-06-13 PROCEDURE — 63600175 PHARM REV CODE 636 W HCPCS: Performed by: STUDENT IN AN ORGANIZED HEALTH CARE EDUCATION/TRAINING PROGRAM

## 2017-06-13 PROCEDURE — 25000003 PHARM REV CODE 250: Performed by: STUDENT IN AN ORGANIZED HEALTH CARE EDUCATION/TRAINING PROGRAM

## 2017-06-13 PROCEDURE — 84100 ASSAY OF PHOSPHORUS: CPT

## 2017-06-13 RX ORDER — LISINOPRIL 20 MG/1
20 TABLET ORAL DAILY
Status: DISCONTINUED | OUTPATIENT
Start: 2017-06-13 | End: 2017-06-13 | Stop reason: HOSPADM

## 2017-06-13 RX ORDER — FUROSEMIDE 40 MG/1
40 TABLET ORAL DAILY
Status: DISCONTINUED | OUTPATIENT
Start: 2017-06-13 | End: 2017-06-13 | Stop reason: HOSPADM

## 2017-06-13 RX ORDER — DOCUSATE SODIUM 100 MG/1
100 CAPSULE, LIQUID FILLED ORAL 2 TIMES DAILY
Qty: 60 CAPSULE | Refills: 1 | Status: SHIPPED | OUTPATIENT
Start: 2017-06-13 | End: 2019-09-05

## 2017-06-13 RX ORDER — ONDANSETRON 4 MG/1
4 TABLET, ORALLY DISINTEGRATING ORAL EVERY 6 HOURS PRN
Qty: 30 TABLET | Refills: 0 | Status: SHIPPED | OUTPATIENT
Start: 2017-06-13 | End: 2018-01-16

## 2017-06-13 RX ORDER — HYDROCODONE BITARTRATE AND ACETAMINOPHEN 5; 325 MG/1; MG/1
1 TABLET ORAL EVERY 4 HOURS PRN
Qty: 60 TABLET | Refills: 0 | Status: SHIPPED | OUTPATIENT
Start: 2017-06-13 | End: 2018-01-16

## 2017-06-13 RX ADMIN — TRAMADOL HYDROCHLORIDE 50 MG: 50 TABLET, COATED ORAL at 03:06

## 2017-06-13 RX ADMIN — HYDROCODONE BITARTRATE AND ACETAMINOPHEN 1 TABLET: 10; 325 TABLET ORAL at 05:06

## 2017-06-13 RX ADMIN — HYDROCODONE BITARTRATE AND ACETAMINOPHEN 1 TABLET: 5; 325 TABLET ORAL at 05:06

## 2017-06-13 RX ADMIN — CLOPIDOGREL 75 MG: 75 TABLET, FILM COATED ORAL at 09:06

## 2017-06-13 RX ADMIN — DIGOXIN 125 MCG: 125 TABLET ORAL at 09:06

## 2017-06-13 RX ADMIN — HYDROCODONE BITARTRATE AND ACETAMINOPHEN 1 TABLET: 10; 325 TABLET ORAL at 10:06

## 2017-06-13 RX ADMIN — LISINOPRIL 20 MG: 20 TABLET ORAL at 09:06

## 2017-06-13 RX ADMIN — AZELASTINE HYDROCHLORIDE 137 MCG: 137 SPRAY, METERED NASAL at 09:06

## 2017-06-13 RX ADMIN — PANTOPRAZOLE SODIUM 40 MG: 40 TABLET, DELAYED RELEASE ORAL at 09:06

## 2017-06-13 RX ADMIN — ONDANSETRON 4 MG: 2 INJECTION INTRAMUSCULAR; INTRAVENOUS at 03:06

## 2017-06-13 RX ADMIN — BUDESONIDE AND FORMOTEROL FUMARATE DIHYDRATE 2 PUFF: 80; 4.5 AEROSOL RESPIRATORY (INHALATION) at 09:06

## 2017-06-13 RX ADMIN — DILTIAZEM HYDROCHLORIDE 180 MG: 60 TABLET, FILM COATED ORAL at 09:06

## 2017-06-13 RX ADMIN — FUROSEMIDE 40 MG: 40 TABLET ORAL at 09:06

## 2017-06-13 RX ADMIN — ONDANSETRON 4 MG: 2 INJECTION INTRAMUSCULAR; INTRAVENOUS at 12:06

## 2017-06-13 RX ADMIN — APIXABAN 5 MG: 2.5 TABLET, FILM COATED ORAL at 09:06

## 2017-06-13 RX ADMIN — POLYETHYLENE GLYCOL 3350 17 G: 17 POWDER, FOR SOLUTION ORAL at 09:06

## 2017-06-13 RX ADMIN — HYDROCODONE BITARTRATE AND ACETAMINOPHEN 1 TABLET: 10; 325 TABLET ORAL at 01:06

## 2017-06-13 NOTE — PT/OT/SLP EVAL
Occupational Therapy  Evaluation and Discharge     Adriana Strong   MRN: [id number]   Admitting Diagnosis: Ventral hernia    OT Date of Treatment: 06/13/17   OT Start Time: 0940  OT Stop Time: 1000  OT Total Time (min): 20 min    Billable Minutes:  Evaluation 20    Diagnosis: Ventral hernia       Past Medical History:   Diagnosis Date    Anticoagulant long-term use     on Plavix since May 2015    Anxiety     Arthritis     Asthma     Cataracts, bilateral     Chest pain, atypical     Colon polyp     Coronary artery disease     Diabetes mellitus     Dry eyes     Esophageal erosions     GERD (gastroesophageal reflux disease)     Heart murmur     Hemorrhoid     High cholesterol     Hypertension     Irritable bowel syndrome     Shingles 2015    TIA (transient ischemic attack)     Use of cane as ambulatory aid       Past Surgical History:   Procedure Laterality Date    ABDOMINAL SURGERY      angiocele      2007, 2014    BREAST SURGERY      left--- a lump--- no cancer    COLONOSCOPY  2014    HYSTERECTOMY  partial    1982 partial hysterectomy    left nasal polyp      left neck lymph node      nose polyp      right hip fatty mass tissue      stent to small intestine      SUPERIOR VENA CAVA ANGIOPLASTY / STENTING      TONSILLECTOMY      TOTAL ABDOMINAL HYSTERECTOMY      2014    TOTAL KNEE ARTHROPLASTY Bilateral     UPPER GASTROINTESTINAL ENDOSCOPY  2014       Referring physician: Nevaeh Brown MD   Date referred to OT: 6/12/17    General Precautions: Standard, fall  Orthopedic Precautions: N/A  Braces:  (abdominal binder when OOB )    Do you have any cultural, spiritual, Temple conflicts, given your current situation?: None stated      Patient History:  Living Environment  Lives With: spouse  Living Arrangements: house  Transportation Available: family or friend will provide  Living Environment Comment: Pt reports she lives with her  in [address] [facility] with [address] [facility] and bedroom/bathroom  "avaliable on 1st floor. Pt reports she was not driving or working PTA. Pt reposts that her husbnd assisted her with dressing and bathing at home PTA. Pt reposts husbnd will be able to provided assistance at home upon d/c. Pt reported she owns DME from previous B TK[employer].   Equipment Currently Used at Home: cane, straight, walker, rolling, bedside commode, bath bench, wheelchair    Prior level of function:   Bed Mobility/Transfers: independent  Grooming: independent  Bathing: independent  Upper Body Dressing: independent  Lower Body Dressing: needs assist  Toileting: independent  Driving License: No  Mode of Transportation: Family, Car     Dominant hand: right    Subjective:  Communicated with RN prior to session.  " I'm getting discharged today"  Chief Complaint: abdominal pain   Patient/Family stated goals: to go home     Pain/Comfort  Pain Rating 1:  (Pt did not report a numberical value but id report discomfort in abdomial )  Location - Orientation 1: upper  Pain Rating Post-Intervention 1:  (Pt did not report a numberical value )    Objective:  Patient found with: peripheral IV, SCD (abdominal binder)   Pt found sitting in bedside chair with family present. Pt agreed to participate in OT evaluation.     Cognitive Exam:  Oriented to: Person, Place, Time and Situation  Follows Commands/attention: Follows multistep  commands  Communication: clear/fluent  Memory:  No Deficits noted  Safety awareness/insight to disability: intact  Coping skills/emotional control: Appropriate to situation    Visual/perceptual:  Intact    Physical Exam:  Postural examination/scapula alignment: Rounded shoulder and Head forward  Skin integrity: Bruising of R UE and Thin. Abdominal incision covered by abdominal binder   Edema: Moderate L UE, pt stated that L UE is usually this swollen. Mild edema R UE    Sensation:   Intact  light/touch B UEs    Upper Extremity Range of Motion:  Right Upper Extremity: WFL  Left Upper Extremity: WFL    Upper " Extremity Strength:  Right Upper Extremity: Not formally tested due to abdominal protection from post-op hernia repair   Left Upper Extremity: Not formally tested due to abdominal protection from post-op hernia repair   Strength: 3+/5 B hands     Fine motor coordination:   Intact  Left hand thumb/finger opposition skills and Right hand thumb/finger opposition skills    Gross motor coordination: WFL    Functional Mobility:  Bed Mobility:  Supine to Sit:  (Not tested, Pt found sitting up in bedside chair )    Transfers:  Sit <> Stand Assistance: Minimum Assistance (Pt required assistance from initial sit to stand. Pt unsteady and felt light headed. Therapist assisted with external support. )  Sit <> Stand Assistive Device: Straight Cane  Toilet Transfer Assistance: Activity Did not Occur    Functional Ambulation: Pt stood from bedside chair with straight cane with Min A for stability. Pt took 2 steps forward and 2 steps backwards with min A using cane and therapist arm for external stability.     Activities of Daily Living:     UE Dressing Level of Assistance: Moderate assistance (To don hospital gown like jacket sitting in bedside chair. Mod A for line management )    LE Dressing Level of Assistance: Total assistance (Pt unable to bring B legs into figure 4 position sitting in bedside chair. Pt reports that  assists with LB dressing at home PTA)     Toileting Level of Assistance: Activity did not occur     Therapeutic Activities and Exercises:  Pt and family educated on:   - role of OT, POC   - Abdominal protection   - OOB with abdominal binder on   - Importance of OOB ax's with assistance   - safety   - Whiteboard updated    AM-PAC 6 CLICK ADL  How much help from another person does this patient currently need?  1 = Unable, Total/Dependent Assistance  2 = A lot, Maximum/Moderate Assistance  3 = A little, Minimum/Contact Guard/Supervision  4 = None, Modified [place]/Independent    Putting on and  "taking off regular lower body clothing? : 1  Bathing (including washing, rinsing, drying)?: 2  Toileting, which includes using toilet, bedpan, or urinal? : 3  Putting on and taking off regular upper body clothing?: 2  Taking care of personal grooming such as brushing teeth?: 4  Eating meals?: 4  Total Score: 16    AM-PAC Raw Score CMS "G-Code Modifier Level of Impairment Assistance   6 % Total / Unable   7 - 9 CM 80 - 100% Maximal Assist   10-14 CL 60 - 80% Moderate Assist   15 - 19 CK 40 - 60% Moderate Assist   20 - 22 CJ 20 - 40% Minimal Assist   23 CI 1-20% SBA / CGA   24 CH 0% Independent/ Mod I       Patient left up in chair with all lines intact, call button in reach and RN notified and family present.     Assessment:  Adriana Strong is a 72 y.o. female with a medical diagnosis of Ventral hernia. Pt tolerated session well and is motivated to return to the home environment. Pt presents with decreased endurance, and overall strength. Pt also presents with decreased (I) in functional mobility and functional tasks. Pt reports that she required assistance from her  for dressing and bathing PTA. Pt has good family support and pt's  is able to provide assistance at home. Pt would benefit from home health OT and PT to maximize pt's (I) and safety with functional mobility and functional tasks. Pt to be discharge from acute OT on 6/13/17.     Rehab identified problem list/impairments: Rehab identified problem list/impairments: weakness, impaired balance, orthopedic precautions, edema    Rehab potential is good.    Activity tolerance: Good    Discharge recommendations: Discharge Facility/Level Of Care Needs: home health PT, home health OT     Barriers to discharge: Barriers to Discharge: None    Equipment recommendations: none     GOALS:    Occupational Therapy Goals     Not on file          Multidisciplinary Problems (Resolved)        Problem: Occupational Therapy Goal    Goal Priority Disciplines " Outcome Interventions   Occupational Therapy Goal   (Resolved)     OT, PT/OT Outcome(s) achieved                    PLAN:  Patient to be seen   to address the above listed problems via    Plan of Care expires:    Plan of Care reviewed with: patient, family         Andria Robles OT  06/13/2017

## 2017-06-13 NOTE — DISCHARGE SUMMARY
[facility]  General Surgery  Discharge Summary      Patient Name: Adriana Strong  MRN: [id number]  Admission Date: 6/12/2017  Hospital Length of Stay: 0 days  Discharge Date and Time:  06/13/2017 3:31 PM  Attending Physician: Jay Milligan MD   Discharging Provider: Roel Blair Jr., MD  Primary Care Provider: Krzysztof Mcgraw MD    HPI:   Patient is a 73 yo female with significant comorbidities who presented for elective laparoscopic ventral hernia repair with mesh.    Procedure(s) (LRB):  REPAIR-HERNIA-LAPAROSCOPIC-INCISIONAL w/ mesh (N/A)      Indwelling Lines/Drains at time of discharge:   Lines/Drains/Airways          No matching active lines, drains, or airways        Hospital Course: Patient underwent laparoscopic incisional hernia repair on 6/12/17.  She tolerated the procedure well and was observed overnight.  She was tolerating diet, ambulating with the cane she uses at home, and had well controlled pain.  She was weaned off supplemental oxygen on post-operative day #1 in the early morning.  She was set up for home health PT and OT and was deemed stable for discharge on post-operative day #1.  She was instructed to continue her anticoagulation as previously prescribed.    Consults:     Significant Diagnostic Studies: Labs:   CMP   Recent Labs  Lab 06/13/17  0414      K 4.1      CO2 23   *   BUN 14   CREATININE 0.7   CALCIUM 8.4*   ANIONGAP 10   ESTGFRAFRICA >60.0   EGFRNONAA >60.0    and CBC   Recent Labs  Lab 06/13/17  0414   WBC 8.05   HGB 12.3   HCT 38.9          Pending Diagnostic Studies:     None        Final Active Diagnoses:    Diagnosis Date Noted POA    PRINCIPAL PROBLEM:  Ventral hernia [K43.9] 06/12/2017 Yes    COPD (chronic obstructive pulmonary disease) [J44.9] 06/13/2017 Yes    Atrial fibrillation with RVR [I48.91] 01/02/2017 Yes    Essential hypertension [I10] 04/08/2016 Yes    Gastroesophageal reflux disease [K21.9]  Yes     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 Schedule an appointment as soon as possible for a visit in 2 weeks.    Specialties:  General Surgery, Bariatrics  Why:  post op laparoscopic hernia repair/Appt 6/27/17 at 11:30 AM.   Contact information:  [address]  [facility] [id number] [phone]                 Patient Instructions:     Diet general     Shower on day dressing removed (No bath)   Order Comments: Keep incision clean and dry for 48 hours.  After 48 hours you may shower over the incision; however, do not submerge incision under water (for example pool, bath, lake, etc) for a total of 2 weeks after surgery.     Lifting restrictions   Order Comments: Do not lift anything heavier than 10 pounds for 6 weeks after surgery.     Other restrictions (specify):   Order Comments: Wear your abdominal binder at all times while awake.  If you need to wash the abdominal binder, that is ok; but put it back on immediately after it is dry.  This is to help support your abdominal wall while the hernia heals.     Call MD for:  temperature >100.4     Call MD for:  persistent nausea and vomiting or diarrhea     Call MD for:  severe uncontrolled pain     Call MD for:  redness, tenderness, or signs of infection (pain, swelling, redness, odor or green/yellow discharge around incision site)     Call MD for:  difficulty breathing or increased cough     Remove dressing in 48 hours   Order Comments: OK to remove outer bandage in 48 hours after surgery.  Do this prior to showering.  There are small strips of white tape directly over the incision.  Allow these to fall off on their own.  This process could take up to 2 weeks.  These small strips of white tape can get wet in the shower.        Medications:  Reconciled Home Medications:   Current Discharge Medication List      START taking these medications    Details   docusate sodium (COLACE) 100 MG capsule Take 1 capsule (100 mg total) by mouth 2 (two) times daily.  Qty: 60 capsule, Refills: 1      hydrocodone-acetaminophen 5-325mg (NORCO) 5-325 mg per tablet Take 1 tablet by mouth every 4 (four) hours as needed.  Qty: 60 tablet, Refills: 0      ondansetron (ZOFRAN-ODT) 4 MG TbDL Take 1 tablet (4 mg total) by mouth every 6 (six) hours as needed (nausea).  Qty: 30 tablet, Refills: 0         CONTINUE these medications which have NOT CHANGED    Details   acetaminophen (TYLENOL ARTHRITIS) 650 MG TbSR Take 1,300 mg by mouth 2 (two) times daily.      albuterol (ACCUNEB) 1.25 mg/3 mL Nebu Take scheduled q4 for the next two days while at home then resume prn dosing  Qty: 3 mL, Refills: 1      albuterol (VENTOLIN HFA) 90 mcg/actuation inhaler Inhale 2 puffs into the lungs every 6 (six) hours as needed for Wheezing. Rescue      ascorbic acid (VITAMIN C) 500 MG tablet Take 500 mg by mouth once daily.      azelastine (ASTELIN) 137 mcg nasal spray 1 spray by Nasal route 2 (two) times daily.      budesonide-formoterol 160-4.5 mcg (SYMBICORT) 160-4.5 mcg/actuation HFAA Inhale 2 puffs into the lungs every 12 (twelve) hours. Controller      budesonide-formoterol 80-4.5 mcg (SYMBICORT) 80-4.5 mcg/actuation HFAA Inhale 2 puffs into the lungs 2 (two) times daily. Controller;   (using SAMPLE)      calcium carbonate (OS-MICHAEL) 600 mg (1,500 mg) Tab Take 1,200 mg by mouth once daily.      digoxin (LANOXIN) 125 mcg tablet Take 1 tablet (125 mcg total) by mouth once daily.  Qty: 90 tablet, Refills: 3    Associated Diagnoses: Atrial fibrillation with RVR      diltiaZEM (CARDIZEM SR) 60 MG Cp12 Take 3 capsules (180 mg total) by mouth 2 (two) times daily.  Qty: 180 capsule, Refills: 11      fexofenadine (ALLEGRA) 60 MG tablet Take 60 mg by mouth every morning.      fish  oil-omega-3 fatty acids 300-1,000 mg capsule Take 1 g by mouth once daily.       fluticasone (FLONASE) 50 mcg/actuation nasal spray 1 spray by Each Nare route every morning.       furosemide (LASIX) 40 MG tablet Take 1 tablet (40 mg total) by mouth once daily.  Qty: 90 tablet, Refills: 3    Associated Diagnoses: Essential hypertension      lisinopril (PRINIVIL,ZESTRIL) 20 MG tablet Take 1 tablet (20 mg total) by mouth once daily.  Qty: 90 tablet, Refills: 3    Associated Diagnoses: Essential hypertension      lorazepam (ATIVAN) 0.5 MG tablet Take 0.5 mg by mouth every morning.       magnesium 250 mg Tab Take 250 mg by mouth. 3 in am and 3 pm      montelukast (SINGULAIR) 10 mg tablet Take 10 mg by mouth every evening.      nitrofurantoin (MACRODANTIN) 50 MG capsule Take 1 capsule (50 mg total) by mouth 4 (four) times daily.  Qty: 48 capsule, Refills: 0      oxybutynin (DITROPAN-XL) 5 MG TR24 Take 5 mg by mouth every evening.       pantoprazole (PROTONIX) 40 MG tablet Take 1 tablet (40 mg total) by mouth once daily.  Qty: 90 tablet, Refills: 3      polyethylene glycol (GLYCOLAX) 17 gram PwPk Take 17 g by mouth once daily.      POLYSORBATE 80/GLYCERIN (REFRESH DRY EYE THERAPY OPHT) Apply to eye 2 (two) times daily.      potassium gluconate 550 mg (90 mg) Tab Take by mouth. 2 in am and 2 pm      psyllium (METAMUCIL) powder Take 1 packet by mouth 3 (three) times daily.      tramadol (ULTRAM) 50 mg tablet Take 1 tablet (50 mg total) by mouth every 6 (six) hours as needed for Pain.  Qty: 60 tablet, Refills: 3      apixaban 5 mg Tab Take 1 tablet (5 mg total) by mouth 2 (two) times daily.  Qty: 60 tablet, Refills: 11    Associated Diagnoses: Persistent atrial fibrillation      clopidogrel (PLAVIX) 75 mg tablet Take 1 tablet (75 mg total) by mouth once daily.  Qty: 90 tablet, Refills: 4      cyanocobalamin (VITAMIN B-12) 1000 MCG tablet Take 100 mcg by mouth once daily.           Time spent on the discharge of patient: 15  minutes    Roel Blair Jr., MD  General Surgery  [facility]

## 2017-06-13 NOTE — SUBJECTIVE & OBJECTIVE
Interval History: No acute events overnight.  Pain well controlled.  No nausea or vomiting, though she did not eat what she was brought for dinner as she did not think it appeared appetizing.  Voiding.  Has ambulated in room.  Abdominal binder in place.    Medications:  Continuous Infusions:   sodium chloride 0.9% 125 mL/hr at 06/12/17 1504     Scheduled Meds:   apixaban  5 mg Oral BID    azelastine  1 spray Nasal BID    budesonide-formoterol 80-4.5 mcg  2 puff Inhalation BID    clopidogrel  75 mg Oral Daily    digoxin  125 mcg Oral Daily    diltiaZEM  180 mg Oral Q12H    fluticasone  1 spray Each Nare QAM    furosemide  40 mg Oral Daily    lisinopril  20 mg Oral Daily    montelukast  10 mg Oral QHS    oxybutynin  5 mg Oral QHS    pantoprazole  40 mg Oral Daily    polyethylene glycol  17 g Oral Daily     PRN Meds:albuterol, hydrocodone-acetaminophen 10-325mg, hydrocodone-acetaminophen 5-325mg, HYDROmorphone, ondansetron, promethazine (PHENERGAN) IVPB, sodium chloride 0.9%, tramadol     Review of patient's allergies indicates:   Allergen Reactions    Celebrex [celecoxib] Shortness Of Breath    Dicyclomine Hives    Adhesive Dermatitis    Avelox [moxifloxacin] Swelling    Cilostazol Other (See Comments)     Elevates blood pressure    Eryc [erythromycin] Other (See Comments)     unknown    Iodine and iodide containing products Hives    Keflex [cephalexin] Hives    Meclomen      rashes    Meloxicam      Ear ringing    Metoclopramide Other (See Comments)     High blood pressure    Sulfa (sulfonamide antibiotics) Itching     Objective:     Vital Signs (Most Recent):  Temp: 98 °F (36.7 °C) (06/13/17 0520)  Pulse: 92 (06/13/17 0700)  Resp: 16 (06/13/17 0520)  BP: (!) 143/67 (06/13/17 0520)  SpO2: (!) 93 % (06/13/17 0520) Vital Signs (24h Range):  Temp:  [96 °F (35.6 °C)-98.2 °F (36.8 °C)] 98 °F (36.7 °C)  Pulse:  [] 92  Resp:  [13-18] 16  SpO2:  [91 %-100 %] 93 %  BP: (101-162)/(49-85) 143/67      Weight: 101.6 kg (224 lb)  Body mass index is 38.45 kg/m².    Intake/Output - Last 3 Shifts       06/11 0700 - 06/12 0659 06/12 0700 - 06/13 0659 06/13 0700 - 06/14 0659    I.V. (mL/kg)  1000 (9.8)     Total Intake(mL/kg)  1000 (9.8)     Net   +1000             Urine Occurrence  350 x     Stool Occurrence  0 x           Physical Exam   Constitutional: She appears well-developed and well-nourished. No distress.   HENT:   Head: Normocephalic and atraumatic.   Eyes: EOM are normal.   Cardiovascular: Normal rate and regular rhythm.    Pulmonary/Chest: Effort normal. No respiratory distress (on 2L O2 via NC).   Mild bilateral lower lobe crackles   Abdominal: Soft. She exhibits no distension. There is tenderness (appropriate incisional). There is no rebound and no guarding.   Bowel sounds present   Neurological: She is alert.   Skin: She is not diaphoretic.   Psychiatric: She has a normal mood and affect.   Nursing note and vitals reviewed.      Significant Labs:  CBC:   Recent Labs  Lab 06/13/17 0414   RBC 3.93*   HGB 12.3   HCT 38.9      MCV 99*   MCH 31.3*   MCHC 31.6*     CMP:   Recent Labs  Lab 06/13/17 0414   *   CALCIUM 8.4*      K 4.1   CO2 23      BUN 14   CREATININE 0.7       Significant Diagnostics:  none to review

## 2017-06-13 NOTE — PLAN OF CARE
Problem: Patient Care Overview  Goal: Plan of Care Review  Outcome: Ongoing (interventions implemented as appropriate)   Pt AA0x3 and VSS.  Two side rails up, bed locked, call light within reach. No falls noted as these precautions remain. Pt free of skin breakdown as the pt moves well independently. Pain controlled well with PRN meds. Hourly rounds made and no complaints at this time noted. Will resume with plan of care.

## 2017-06-13 NOTE — PLAN OF CARE
Problem: Physical Therapy Goal  Goal: Physical Therapy Goal  Goals to be met by: 2017     Patient will increase functional independence with mobility by performin. Supine to sit with Stand-by Assistance using log rolling technique.  2. Sit to supine with Stand-by Assistance using log rolling technique.  3. Rolling to Left and Right with Stand-by Assistance.  4. Sit to stand transfer with Stand-by Assistance  5. Bed to chair transfer with Stand-by Assistance using Rolling Walker  6. Gait  x 150 feet with Stand-by Assistance using Rolling Walker.   7. Ascend/descend 1 flight of stairs with bilateral Handrails and CGA to be able to access primary bedroom on the 2nd floor.   Outcome: Ongoing (interventions implemented as appropriate)  Initial eval completed. Results discussed with patient and .

## 2017-06-13 NOTE — OP NOTE
DATE OF PROCEDURE:  06/12/2017    DIAGNOSIS:  Incarcerated ventral hernia.    PROCEDURE:  Laparoscopic repair of incarcerated ventral hernia with mesh.    SURGEON:  Jay Milligan M.D.    ASSISTANT:  Nevaeh Brown M.D. (RES)    ANESTHESIA:  General.    DESCRIPTION OF PROCEDURE:  The patient was placed under general anesthesia.  The   abdomen was prepped and draped in the usual manner.  Access to the peritoneum   was gained through the left upper quadrant using Optiview trocar under direct   vision.  Pneumoperitoneum to 15 mmHg with CO2 gas was obtained.  A 5 mm trocar   was placed in the left lower quadrant and two 5 mm trocars were placed in the   right mid abdomen.  There were adhesions to the lower midline.  These were taken   down showing an incarcerated but small defect and lower abdominal hernia with   omentum and small bowel.  This was entirely reduced.  The defect itself was   about 1 cm.  There was another smaller, this was just an inch or so below the   navel.  There was another smaller navel hernia.  A 5 x 5 inch Proceed mesh was   placed in antibiotic solution.  [place] sutures were placed in the corners.  It   was placed into the abdominal cavity and pulled up with a suture passer   appropriately to stretch it out, tied down, and a double crown technique with   secure strap staples was used.  The [place]Carlos sutures were tied down.  The   abdomen was inspected for hemostasis.  Trocars were removed under direct vision.    Prior to removing the last trocar, pneumoperitoneum was allowed to escape.    The skin incisions were infiltrated with Marcaine, closed with 4-0 plain catgut,   and reinforced with Mastisol, Steri-Strips, and Band-Aids and an abdominal   binder.  The patient tolerated the procedure well and was brought to Recovery   Room in stable condition.  Sponge and needle counts were correct at the end of   the case.      MARIE/KRISTA  dd: 06/12/2017 14:19:34 (CDT)  td: 06/12/2017 19:30:04  (CDT)  Doc ID   #[id number]  Job ID #[id number]    CC:

## 2017-06-13 NOTE — NURSING TRANSFER
Nursing Transfer Note      6/12/2017      Transfer From: PACU    Transfer via stretcher    Transfer with  to O2, cardiac monitoring    Transported by pct    Medicines sent: none    Chart send with patient: Yes    Notified: spouse, daughter    Patient reassessed at: 6/12/2017, 1715 (date, time)    Upon arrival to floor: cardiac monitor applied, patient oriented to room, call bell in reach and bed in lowest position

## 2017-06-13 NOTE — PLAN OF CARE
Problem: Occupational Therapy Goal  Goal: Occupational Therapy Goal  Outcome: Outcome(s) achieved Date Met: 06/13/17  Initial evaluation completed   No goals set, pt d/c from acute occupational therapy 6/13/17

## 2017-06-13 NOTE — NURSING
Pt discharge. Pt verbalized understanding discharge instructions. PT IV removed with catheter tip intact. Medications reviewed with Pt. Pt has no acute complaints at this time. Pt awaiting transport.

## 2017-06-13 NOTE — PT/OT/SLP EVAL
Physical Therapy  Evaluation    Adriana Strong   MRN: [id number]   Admitting Diagnosis: Ventral hernia s/p mesh hernia repair    PT Received On: 06/13/17  PT Start Time: 1105     PT Stop Time: 1133    PT Total Time (min): 28 min       Billable Minutes:  Evaluation 20 and Therapeutic Activity 8    Diagnosis: Ventral hernia    Comorbidities and personal factors:   Factors that affect the plan of care, the patient's participation in it or ability to progress though it:  1. Symptomatic asthma  2. COPD  3. A fib   4. TIAs  5. Decreased endurance and SOB with activity prior to admit  6. 2nd floor bedroom     Clinical Presentation:  evolving/changing characteristics   Pt presented with respiratory/asthma symptoms: wheezing and SOB during bed mobility.  She required seated rest break and use of asthma inhaler prior during session to improve symptoms.  Mobilization performed conservatively due to SOB with careful monitoring..      Level of Complexity:   Moderate Complexity:   · At least 1-2 personal factors or comorbitities that impact the plan of care  · Examination addressing at least 3 body structures and functions, activity limitations, and/or participation restrictions  · Clinical presentation with evolving or changing characteristics      Past Medical History:   Diagnosis Date    Anticoagulant long-term use     on Plavix since May 2015    Anxiety     Arthritis     Asthma     Cataracts, bilateral     Chest pain, atypical     Colon polyp     Coronary artery disease     Diabetes mellitus     Dry eyes     Esophageal erosions     GERD (gastroesophageal reflux disease)     Heart murmur     Hemorrhoid     High cholesterol     Hypertension     Irritable bowel syndrome     Shingles 2015    TIA (transient ischemic attack)     Use of cane as ambulatory aid       Past Surgical History:   Procedure Laterality Date    ABDOMINAL SURGERY      angiocele      2007, 2014    BREAST SURGERY      left--- a lump--- no  "cancer    COLONOSCOPY  2014    HYSTERECTOMY  partial    1982 partial hysterectomy    left nasal polyp      left neck lymph node      nose polyp      right hip fatty mass tissue      stent to small intestine      SUPERIOR VENA CAVA ANGIOPLASTY / STENTING      TONSILLECTOMY      TOTAL ABDOMINAL HYSTERECTOMY      2014    TOTAL KNEE ARTHROPLASTY Bilateral     UPPER GASTROINTESTINAL ENDOSCOPY  2014       Referring physician: Nevaeh Brown MD  Date referred to PT: 6/12/2017    General Precautions: Standard, fall  Orthopedic Precautions:     Braces:  (abdominal binder with OOB)        Patient History:  Living Environment Comment: Patient lives with her  in a 2 story home with bed/bath available downstairs.  She was ambulatory with a cane prior to admit but required assistance with dressing/bathing feet and back.   priovides assistance.  Pt owns DME from previous bilateral TKAs.  Equipment Currently Used at Home:  (cane; owns RW, BSC, tub bench, w/c)    Subjective:  Communicated with RN prior to session.  "I got winded pretty quickly at home before this.  I soul only walk short distances.  My  helped me."  Chief Complaint: SOB/wheezing  Patient goals: return home    Pain/Comfort  Pain Rating 1: 4/10  Pain Rating Post-Intervention 1: 4/10      Objective:   Patient found with:  (abdominal binder, SCDs)   Patient found supine in bed with  at bedside.  She agreed to therapy evaluation.  Therapist instructed patient in abdominal protection s/p hernia repair and log rolling technique for bed mobility.  PT verbalized understanding.  She c/o SOB and wheezing during therapy and required rest break with use of inhaler prior to resuming mobility tasks.     EXAMINATION    Cognitive Function:  Oriented to: Person, Place, Time and Situation  Level of Alertness: awake and alert  Follows Commands/attention: Follows multistep  commands  Communication: clear/fluent  Safety awareness/insight to " disability: intact    Musculoskeletal System  Posture and gross symmetry: Rounded shoulder, Head forward and obesity     Lower Extremity Range of Motion:  Right Lower Extremity: WFL, limited by body habitus  Left Lower Extremity: WFL, limited by body habitus    Lower Extremity Strength:  Right Lower Extremity: grossly 5/5  Left Lower Extremity: grossly 5/5     Integumentary System:  Skin integrity: abdominal incision covered by abdominal binder, well healed incisions bilateral knees from TKA    Cardiopulmonary System:  Edema: Moderate, pitting edema BLEs, pt states she wears TEDs at home    Neuromuscular System:  Sensation:   Light Touch (LT): intact   Deep Pressure (DP): intact     Fine motor coordination: Intact  Gross motor coordination: WFL    Balance:   Static Sit: GOOD: Takes MODERATE challenges from all directions;   Dynamic Sit: GOOD: Maintains balance through MODERATE excursions of active trunk movement;   Static Stand: FAIR+: Takes MINIMAL challenges from all directions; cane for balance  Dynamic stand: POOR: N/A; see gait    Postural reactions: good     FUNCTIONAL MOBILITY ASSESSMENT:  Bed Mobility:   Rolling L: min assist to initiate rolling with LEs in hooklying position   Supine to sit: min assist using logrolling technique    Transfers:   Sit to stand: CGA with cane  Bed to chair transfer: CGA with cane    Gait:    Pt ambulated 100 feet min assist with single point cane.  She demonstrated definite need for bilateral UE support, but no LOB or safety concerns. She exhibits decreased dany, and decreased step length with gait limited by decreased endurance/SOB.    Pt bedroom is upstairs.  Not safe to attempt stairs on this date.  Pt able to sleep in downstairs bedroom.      Therapeutic Activities and Exercises:  Therapist educated patient on the following  · Role of PT, POC  · Abdominal protection   · Abdominal binder for OOB mobility  · D/c planning: RW for mobility, sleep downstairs  · Ambulate  3x/day with assist  · Up in chair    AM-PAC 6 CLICK MOBILITY  How much help from another person does this patient currently need?   1 = Unable, Total/Dependent Assistance  2 = A lot, Maximum/Moderate Assistance  3 = A little, Minimum/Contact Guard/Supervision  4 = None, Modified [place]/Independent    Turning over in bed (including adjusting bedclothes, sheets and blankets)?: 3  Sitting down on and standing up from a chair with arms (e.g., wheelchair, bedside commode, etc.): 3  Moving from lying on back to sitting on the side of the bed?: 3  Moving to and from a bed to a chair (including a wheelchair)?: 3  Need to walk in hospital room?: 3  Climbing 3-5 steps with a railing?: 3  Total Score: 18     AM-PAC Raw Score CMS G-Code Modifier Level of Impairment Assistance   6 % Total / Unable   7 - 9 CM 80 - 100% Maximal Assist   10 - 14 CL 60 - 80% Moderate Assist   15 - 19 CK 40 - 60% Moderate Assist   20 - 22 CJ 20 - 40% Minimal Assist   23 CI 1-20% SBA / CGA   24 CH 0% Independent/ Mod I     Patient left up in chair with all lines intact, call button in reach and   present.    Assessment:   Adriana Strong is a 72 y.o. female with a medical diagnosis of Ventral hernia and presents with decreased functional mobility post-op pain and decreased endurance. She admits to poor endurance prior to admit, but was able to ambulate in the home with cane as needed.  Patient requires bilateral UE support for gait at this time.  Recommend patient use RW upon discharge (pt owns).  She needs min assist with bed mobility for logrolling technique, but is expected to improve quickly.  She is safe to discharge home with family assist and HH PT/OT with the following recommendations: RW for gait and sleep in downstairs bedroom.    Rehab identified problem list/impairments: Rehab identified problem list/impairments: impaired functional mobilty, pain, impaired skin, orthopedic precautions, edema    Rehab potential is  good.    Activity tolerance: Good    Discharge recommendations: Discharge Facility/Level Of Care Needs: home health OT, home health PT     Barriers to discharge: Barriers to Discharge: None    Equipment recommendations: Equipment Needed After Discharge: none     GOALS:    Physical Therapy Goals        Problem: Physical Therapy Goal    Goal Priority Disciplines Outcome Goal Variances Interventions   Physical Therapy Goal     PT/OT, PT Ongoing (interventions implemented as appropriate)     Description:  Goals to be met by: 2017     Patient will increase functional independence with mobility by performin. Supine to sit with Stand-by Assistance using log rolling technique.  2. Sit to supine with Stand-by Assistance using log rolling technique.  3. Rolling to Left and Right with Stand-by Assistance.  4. Sit to stand transfer with Stand-by Assistance  5. Bed to chair transfer with Stand-by Assistance using Rolling Walker  6. Gait  x 150 feet with Stand-by Assistance using Rolling Walker.   7. Ascend/descend 1 flight of stairs with bilateral Handrails and CGA to be able to access primary bedroom on the 2nd floor.                     PLAN:    Patient to be seen 5 x/week to address the above listed problems via gait training, therapeutic activities, therapeutic exercises  Plan of Care expires: 17  Plan of Care reviewed with: patient, spouse          Nida Hansen, PT  2017

## 2017-06-13 NOTE — ASSESSMENT & PLAN NOTE
-Restart home breathing treatments or equivalent  -Incentive spirometry  -Wean supplemental oxygen for SpO2 >92%

## 2017-06-13 NOTE — PLAN OF CARE
[facility]    HOME HEALTH ORDERS  FACE TO FACE ENCOUNTER    Patient Name: Adriana Strong  YOB: 1945    PCP: Krzysztof Mcgraw MD   PCP Address: [address] / [place] [address]  PCP Phone Number: [phone]  PCP Fax: [phone]    Encounter Date: 06/13/2017    Admit to Home Health    Diagnoses:  Active Hospital Problems    Diagnosis  POA    *Ventral hernia [K43.9]  Yes     Priority: 3     COPD (chronic obstructive pulmonary disease) [J44.9]  Unknown     Priority: 16     Atrial fibrillation with RVR [I48.91]  Yes    Essential hypertension [I10]  Yes    Gastroesophageal reflux disease [K21.9]  Yes    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       Future Appointments  Date Time Provider Department Center   6/27/2017 11:30 AM Jay Milligan MD [facility]   8/8/2017 9:30 AM Jr Carrington MD [facility]     Follow-up Information     Jay Milligan MD. Schedule an appointment as soon as possible for a visit in 2 weeks.    Specialties:  General Surgery, Bariatrics  Why:  post op laparoscopic hernia repair/Appt 6/27/17 at 11:30 AM.   Contact information:  [address]  [facility] [address] [phone]                     I have seen and examined this patient face to face today. My clinical findings that support the need for the home health skilled services and home bound status are the following:  Medical restrictions requiring assistance of another human to leave home due to  Post surgery monitoring.    Allergies:  Review of patient's allergies indicates:   Allergen Reactions    Celebrex [celecoxib] Shortness Of Breath    Dicyclomine Hives    Adhesive Dermatitis    Avelox [moxifloxacin] Swelling    Cilostazol Other (See Comments)     Elevates blood pressure    Eryc [erythromycin] Other (See Comments)     unknown    Iodine  and iodide containing products Hives    Keflex [cephalexin] Hives    Meclomen      rashes    Meloxicam      Ear ringing    Metoclopramide Other (See Comments)     High blood pressure    Sulfa (sulfonamide antibiotics) Itching       Diet: regular diet    Activities: no heavy lifting (>10 pounds) for 6 weeks    Nursing:   SN to complete comprehensive assessment including routine vital signs. Instruct on disease process and s/s of complications to report to MD. Review/verify medication list sent home with the patient at time of discharge  and instruct patient/caregiver as needed. Frequency may be adjusted depending on start of care date.    Notify MD if SBP > 160 or < 90; DBP > 90 or < 50; HR > 120 or < 50; Temp > 101      CONSULTS:    Physical Therapy to evaluate and treat. Evaluate for home safety and equipment needs; Establish/upgrade home exercise program. Perform / instruct on therapeutic exercises, gait training, transfer training, and Range of Motion.  Occupational Therapy to evaluate and treat. Evaluate home environment for safety and equipment needs. Perform/Instruct on transfers, ADL training, ROM, and therapeutic exercises.    MISCELLANEOUS CARE:  N/A    WOUND CARE ORDERS  n/a      Medications: Review discharge medications with patient and family and provide education.      Current Discharge Medication List      START taking these medications    Details   docusate sodium (COLACE) 100 MG capsule Take 1 capsule (100 mg total) by mouth 2 (two) times daily.  Qty: 60 capsule, Refills: 1      hydrocodone-acetaminophen 5-325mg (NORCO) 5-325 mg per tablet Take 1 tablet by mouth every 4 (four) hours as needed.  Qty: 60 tablet, Refills: 0         CONTINUE these medications which have NOT CHANGED    Details   acetaminophen (TYLENOL ARTHRITIS) 650 MG TbSR Take 1,300 mg by mouth 2 (two) times daily.      albuterol (ACCUNEB) 1.25 mg/3 mL Nebu Take scheduled q4 for the next two days while at home then resume prn  dosing  Qty: 3 mL, Refills: 1      albuterol (VENTOLIN HFA) 90 mcg/actuation inhaler Inhale 2 puffs into the lungs every 6 (six) hours as needed for Wheezing. Rescue      ascorbic acid (VITAMIN C) 500 MG tablet Take 500 mg by mouth once daily.      azelastine (ASTELIN) 137 mcg nasal spray 1 spray by Nasal route 2 (two) times daily.      budesonide-formoterol 160-4.5 mcg (SYMBICORT) 160-4.5 mcg/actuation HFAA Inhale 2 puffs into the lungs every 12 (twelve) hours. Controller      budesonide-formoterol 80-4.5 mcg (SYMBICORT) 80-4.5 mcg/actuation HFAA Inhale 2 puffs into the lungs 2 (two) times daily. Controller;   (using SAMPLE)      calcium carbonate (OS-MICHAEL) 600 mg (1,500 mg) Tab Take 1,200 mg by mouth once daily.      digoxin (LANOXIN) 125 mcg tablet Take 1 tablet (125 mcg total) by mouth once daily.  Qty: 90 tablet, Refills: 3    Associated Diagnoses: Atrial fibrillation with RVR      diltiaZEM (CARDIZEM SR) 60 MG Cp12 Take 3 capsules (180 mg total) by mouth 2 (two) times daily.  Qty: 180 capsule, Refills: 11      fexofenadine (ALLEGRA) 60 MG tablet Take 60 mg by mouth every morning.      fish oil-omega-3 fatty acids 300-1,000 mg capsule Take 1 g by mouth once daily.       fluticasone (FLONASE) 50 mcg/actuation nasal spray 1 spray by Each Nare route every morning.       furosemide (LASIX) 40 MG tablet Take 1 tablet (40 mg total) by mouth once daily.  Qty: 90 tablet, Refills: 3    Associated Diagnoses: Essential hypertension      lisinopril (PRINIVIL,ZESTRIL) 20 MG tablet Take 1 tablet (20 mg total) by mouth once daily.  Qty: 90 tablet, Refills: 3    Associated Diagnoses: Essential hypertension      lorazepam (ATIVAN) 0.5 MG tablet Take 0.5 mg by mouth every morning.       magnesium 250 mg Tab Take 250 mg by mouth. 3 in am and 3 pm      montelukast (SINGULAIR) 10 mg tablet Take 10 mg by mouth every evening.      nitrofurantoin (MACRODANTIN) 50 MG capsule Take 1 capsule (50 mg total) by mouth 4 (four) times  daily.  Qty: 48 capsule, Refills: 0      oxybutynin (DITROPAN-XL) 5 MG TR24 Take 5 mg by mouth every evening.       pantoprazole (PROTONIX) 40 MG tablet Take 1 tablet (40 mg total) by mouth once daily.  Qty: 90 tablet, Refills: 3      polyethylene glycol (GLYCOLAX) 17 gram PwPk Take 17 g by mouth once daily.      POLYSORBATE 80/GLYCERIN (REFRESH DRY EYE THERAPY OPHT) Apply to eye 2 (two) times daily.      potassium gluconate 550 mg (90 mg) Tab Take by mouth. 2 in am and 2 pm      psyllium (METAMUCIL) powder Take 1 packet by mouth 3 (three) times daily.      tramadol (ULTRAM) 50 mg tablet Take 1 tablet (50 mg total) by mouth every 6 (six) hours as needed for Pain.  Qty: 60 tablet, Refills: 3      apixaban 5 mg Tab Take 1 tablet (5 mg total) by mouth 2 (two) times daily.  Qty: 60 tablet, Refills: 11    Associated Diagnoses: Persistent atrial fibrillation      clopidogrel (PLAVIX) 75 mg tablet Take 1 tablet (75 mg total) by mouth once daily.  Qty: 90 tablet, Refills: 4      cyanocobalamin (VITAMIN B-12) 1000 MCG tablet Take 100 mcg by mouth once daily.             I certify that this patient is confined to her home and needs physical therapy and occupational therapy.

## 2017-06-13 NOTE — HPI
Patient is a 73 yo female with significant comorbidities who presented for elective laparoscopic ventral hernia repair with mesh.

## 2017-06-13 NOTE — PROGRESS NOTES
[facility]  General Surgery  Progress Note    Subjective:     History of Present Illness:  Patient is a 71 yo female with significant comorbidities who presented for elective laparoscopic ventral hernia repair with mesh.    Post-Op Info:  Procedure(s) (LRB):  REPAIR-HERNIA-LAPAROSCOPIC-INCISIONAL w/ mesh (N/A)   1 Day Post-Op     Interval History: No acute events overnight.  Pain well controlled.  No nausea or vomiting, though she did not eat what she was brought for dinner as she did not think it appeared appetizing.  Voiding.  Has ambulated in room.  Abdominal binder in place.    Medications:  Continuous Infusions:   sodium chloride 0.9% 125 mL/hr at 06/12/17 1504     Scheduled Meds:   apixaban  5 mg Oral BID    azelastine  1 spray Nasal BID    budesonide-formoterol 80-4.5 mcg  2 puff Inhalation BID    clopidogrel  75 mg Oral Daily    digoxin  125 mcg Oral Daily    diltiaZEM  180 mg Oral Q12H    fluticasone  1 spray Each Nare QAM    furosemide  40 mg Oral Daily    lisinopril  20 mg Oral Daily    montelukast  10 mg Oral QHS    oxybutynin  5 mg Oral QHS    pantoprazole  40 mg Oral Daily    polyethylene glycol  17 g Oral Daily     PRN Meds:albuterol, hydrocodone-acetaminophen 10-325mg, hydrocodone-acetaminophen 5-325mg, HYDROmorphone, ondansetron, promethazine (PHENERGAN) IVPB, sodium chloride 0.9%, tramadol     Review of patient's allergies indicates:   Allergen Reactions    Celebrex [celecoxib] Shortness Of Breath    Dicyclomine Hives    Adhesive Dermatitis    Avelox [moxifloxacin] Swelling    Cilostazol Other (See Comments)     Elevates blood pressure    Eryc [erythromycin] Other (See Comments)     unknown    Iodine and iodide containing products Hives    Keflex [cephalexin] Hives    Meclomen      rashes    Meloxicam      Ear ringing    Metoclopramide Other (See Comments)     High blood pressure    Sulfa (sulfonamide antibiotics) Itching     Objective:     Vital Signs  (Most Recent):  Temp: 98 °F (36.7 °C) (06/13/17 0520)  Pulse: 92 (06/13/17 0700)  Resp: 16 (06/13/17 0520)  BP: (!) 143/67 (06/13/17 0520)  SpO2: (!) 93 % (06/13/17 0520) Vital Signs (24h Range):  Temp:  [96 °F (35.6 °C)-98.2 °F (36.8 °C)] 98 °F (36.7 °C)  Pulse:  [] 92  Resp:  [13-18] 16  SpO2:  [91 %-100 %] 93 %  BP: (101-162)/(49-85) 143/67     Weight: 101.6 kg (224 lb)  Body mass index is 38.45 kg/m².    Intake/Output - Last 3 Shifts       06/11 0700 - 06/12 0659 06/12 0700 - 06/13 0659 06/13 0700 - 06/14 0659    I.V. (mL/kg)  1000 (9.8)     Total Intake(mL/kg)  1000 (9.8)     Net   +1000             Urine Occurrence  350 x     Stool Occurrence  0 x           Physical Exam   Constitutional: She appears well-developed and well-nourished. No distress.   HENT:   Head: Normocephalic and atraumatic.   Eyes: EOM are normal.   Cardiovascular: Normal rate and regular rhythm.    Pulmonary/Chest: Effort normal. No respiratory distress (on 2L O2 via NC).   Mild bilateral lower lobe crackles   Abdominal: Soft. She exhibits no distension. There is tenderness (appropriate incisional). There is no rebound and no guarding.   Bowel sounds present   Neurological: She is alert.   Skin: She is not diaphoretic.   Psychiatric: She has a normal mood and affect.   Nursing note and vitals reviewed.      Significant Labs:  CBC:   Recent Labs  Lab 06/13/17  0414   RBC 3.93*   HGB 12.3   HCT 38.9      MCV 99*   MCH 31.3*   MCHC 31.6*     CMP:   Recent Labs  Lab 06/13/17  0414   *   CALCIUM 8.4*      K 4.1   CO2 23      BUN 14   CREATININE 0.7       Significant Diagnostics:  none to review    Assessment/Plan:     * Ventral hernia    -Ambulate  -Pain control  -Wear abdominal binder  -Advance diet to regular  -Physical therapy eval to ensure no home needs  -Possible discharge today if able to tolerate diet, ambulate in halls, and wean from supplemental oxygen        COPD (chronic obstructive pulmonary disease)     -Restart home breathing treatments or equivalent  -Incentive spirometry  -Wean supplemental oxygen for SpO2 >92%        Atrial fibrillation with RVR    -Restart home medications.        Essential hypertension    -Restart home meds        Gastroesophageal reflux disease    -Restart oral protonix        Mesenteric artery insufficiency    -Restart Plavix today            Roel Blair Jr., MD  General Surgery  [facility]

## 2017-06-13 NOTE — PLAN OF CARE
SHARYN has faxed clinical information to Betty, op services for [facility], ph#[phone]/F#[phone] for home health PT assignment.    4:10pm- KENNETH Hernández, has verified receipt of clinical documents.    Mack Lewis, [facility]  [id number]

## 2017-06-13 NOTE — PLAN OF CARE
SW met w/pt and spouse to discuss h/h preference.  Sw explained that PHN select a h/h agency and will notified SW of selection.  Pt reports she has not had h/h in the past and is okay w/PHN making the selection.    SW has left a vmsg for Lissette RN care manager coordinator for PHN, awaiting a return call.    Mack Lewis, STACIE  [id number]

## 2017-06-13 NOTE — PLAN OF CARE
Dr. Ingram called  to inform of PT/OT recs;HH POC notes placed for HH PT. SHARYN Laguerre, informed & will arrange.

## 2017-06-13 NOTE — ASSESSMENT & PLAN NOTE
-Ambulate  -Pain control  -Wear abdominal binder  -Advance diet to regular  -Physical therapy eval to ensure no home needs  -Possible discharge today if able to tolerate diet, ambulate in halls, and wean from supplemental oxygen

## 2017-06-13 NOTE — HOSPITAL COURSE
Patient underwent laparoscopic incisional hernia repair on 6/12/17.  She tolerated the procedure well and was observed overnight.  She was tolerating diet, ambulating with the cane she uses at home, and had well controlled pain.  She was weaned off supplemental oxygen on post-operative day #1 in the early morning.  She was set up for home health PT and OT and was deemed stable for discharge on post-operative day #1.

## 2017-06-15 ENCOUNTER — TELEPHONE (OUTPATIENT)
Dept: SURGERY | Facility: CLINIC | Age: 72
End: 2017-06-15

## 2017-06-15 NOTE — TELEPHONE ENCOUNTER
Patient called c/o constipation and swelling in both feet , patient was just d/c from hospital 6/12 for hernia repair. Explained to her that it was common to have have irregular bowel movements when taking pain meds , she could half her dosage and use miralax as prescribed per Dr. Milligan . Pt v/u

## 2017-06-16 ENCOUNTER — HOSPITAL ENCOUNTER (EMERGENCY)
Facility: HOSPITAL | Age: 72
Discharge: HOME | End: 2017-06-16
Attending: EMERGENCY MEDICINE
Payer: MEDICARE

## 2017-06-16 VITALS
HEIGHT: 64 IN | BODY MASS INDEX: 38.24 KG/M2 | WEIGHT: 224 LBS | OXYGEN SATURATION: 98 % | DIASTOLIC BLOOD PRESSURE: 63 MMHG | TEMPERATURE: 98 F | SYSTOLIC BLOOD PRESSURE: 128 MMHG | HEART RATE: 88 BPM | RESPIRATION RATE: 20 BRPM

## 2017-06-16 DIAGNOSIS — E86.0 DEHYDRATION: Primary | ICD-10-CM

## 2017-06-16 DIAGNOSIS — I48.91 ATRIAL FIBRILLATION WITH RVR: ICD-10-CM

## 2017-06-16 LAB
ALBUMIN SERPL BCP-MCNC: 3.4 G/DL
ALP SERPL-CCNC: 49 U/L
ALT SERPL W/O P-5'-P-CCNC: 22 U/L
ANION GAP SERPL CALC-SCNC: 10 MMOL/L
AST SERPL-CCNC: 19 U/L
BASOPHILS # BLD AUTO: 0.01 K/UL
BASOPHILS NFR BLD: 0.1 %
BILIRUB SERPL-MCNC: 0.5 MG/DL
BNP SERPL-MCNC: 304 PG/ML
BUN SERPL-MCNC: 7 MG/DL
CALCIUM SERPL-MCNC: 8.9 MG/DL
CHLORIDE SERPL-SCNC: 96 MMOL/L
CO2 SERPL-SCNC: 32 MMOL/L
CREAT SERPL-MCNC: 0.7 MG/DL
DIFFERENTIAL METHOD: ABNORMAL
EOSINOPHIL # BLD AUTO: 0 K/UL
EOSINOPHIL NFR BLD: 0.1 %
ERYTHROCYTE [DISTWIDTH] IN BLOOD BY AUTOMATED COUNT: 14.5 %
EST. GFR  (AFRICAN AMERICAN): >60 ML/MIN/1.73 M^2
EST. GFR  (NON AFRICAN AMERICAN): >60 ML/MIN/1.73 M^2
GLUCOSE SERPL-MCNC: 129 MG/DL
HCT VFR BLD AUTO: 41.1 %
HGB BLD-MCNC: 13.6 G/DL
LYMPHOCYTES # BLD AUTO: 1.6 K/UL
LYMPHOCYTES NFR BLD: 20.8 %
MCH RBC QN AUTO: 32.1 PG
MCHC RBC AUTO-ENTMCNC: 33.1 %
MCV RBC AUTO: 97 FL
MONOCYTES # BLD AUTO: 0.9 K/UL
MONOCYTES NFR BLD: 12.4 %
NEUTROPHILS # BLD AUTO: 5 K/UL
NEUTROPHILS NFR BLD: 66.6 %
PLATELET # BLD AUTO: 299 K/UL
PMV BLD AUTO: 10.1 FL
POTASSIUM SERPL-SCNC: 3.8 MMOL/L
PROT SERPL-MCNC: 6.5 G/DL
RBC # BLD AUTO: 4.24 M/UL
SODIUM SERPL-SCNC: 138 MMOL/L
TROPONIN I SERPL DL<=0.01 NG/ML-MCNC: 0.01 NG/ML
WBC # BLD AUTO: 7.51 K/UL

## 2017-06-16 PROCEDURE — 85025 COMPLETE CBC W/AUTO DIFF WBC: CPT

## 2017-06-16 PROCEDURE — 25000003 PHARM REV CODE 250: Performed by: EMERGENCY MEDICINE

## 2017-06-16 PROCEDURE — 80053 COMPREHEN METABOLIC PANEL: CPT

## 2017-06-16 PROCEDURE — 93005 ELECTROCARDIOGRAM TRACING: CPT

## 2017-06-16 PROCEDURE — 84484 ASSAY OF TROPONIN QUANT: CPT

## 2017-06-16 PROCEDURE — 99285 EMERGENCY DEPT VISIT HI MDM: CPT | Mod: 25

## 2017-06-16 PROCEDURE — 83880 ASSAY OF NATRIURETIC PEPTIDE: CPT

## 2017-06-16 PROCEDURE — 96360 HYDRATION IV INFUSION INIT: CPT

## 2017-06-16 RX ADMIN — SODIUM CHLORIDE 500 ML: 0.9 INJECTION, SOLUTION INTRAVENOUS at 11:06

## 2017-06-16 NOTE — DISCHARGE INSTRUCTIONS
1) PLEASE FOLLOW UP WITH YOUR PRIMARY CARE PROVIDER IN 3 DAYS IF YOU ARE NOT SIGNIFICANTLY IMPROVED. YOU HAVE BEEN GIVEN FLUIDS TO HELP WITH YOUR DEHYDRATION AND ELEVATED HEART RATE. YOUR BLOOD WORK LOOKS NORMAL.   2) PLEASE TAKE YOUR MEDICATIONS AS PRESCRIBED  3) RETURN TO THE ED FOR WORSENING SYMPTOMS

## 2017-06-16 NOTE — PT/OT/SLP DISCHARGE
Physical Therapy Discharge Summary    Adriana Strong  MRN: [id number]   Ventral hernia     Patient Discharged from acute Physical Therapy on 2017.  Please refer to prior PT noted date on 2017 for functional status.     Assessment:   Patient appropriate for care in another setting.  GOALS:    Physical Therapy Goals        Problem: Physical Therapy Goal    Goal Priority Disciplines Outcome Goal Variances Interventions   Physical Therapy Goal     PT/OT, PT Ongoing (interventions implemented as appropriate)     Description:  Goals to be met by: 2017     Patient will increase functional independence with mobility by performin. Supine to sit with Stand-by Assistance using log rolling technique.  2. Sit to supine with Stand-by Assistance using log rolling technique.  3. Rolling to Left and Right with Stand-by Assistance.  4. Sit to stand transfer with Stand-by Assistance  5. Bed to chair transfer with Stand-by Assistance using Rolling Walker  6. Gait  x 150 feet with Stand-by Assistance using Rolling Walker.   7. Ascend/descend 1 flight of stairs with bilateral Handrails and CGA to be able to access primary bedroom on the 2nd floor.                   Reasons for Discontinuation of Therapy Services  Transfer to alternate level of care.      Plan:  Patient Discharged to: Home with Home Health Service.    Nida Hansen, PT  2017  [phone] (pager)

## 2017-06-16 NOTE — ED PROVIDER NOTES
Encounter Date: 6/16/2017       History     Chief Complaint   Patient presents with    Atrial Fibrillation     sent here by home health for episode of  A fib with RVR, denies CP, SOB but has had diarrhea since yesterday. hernia repair on monday. took stool softeners, miralax and prune juice     Review of patient's allergies indicates:   Allergen Reactions    Celebrex [celecoxib] Shortness Of Breath    Dicyclomine Hives    Adhesive Dermatitis    Avelox [moxifloxacin] Swelling    Cilostazol Other (See Comments)     Elevates blood pressure    Eryc [erythromycin] Other (See Comments)     unknown    Iodine and iodide containing products Hives    Keflex [cephalexin] Hives    Meclomen      rashes    Meloxicam      Ear ringing    Metoclopramide Other (See Comments)     High blood pressure    Sulfa (sulfonamide antibiotics) Itching     71 yo F here from home, sent in by home health, with concern for atrial fibrillation with rapid ventricular response.  Patient is 1 week postop from an elective hernia repair.  She had preoperative clearance and was discharged on Monday back home.  She has had problems with constipation and over the last 2 days has taken a compressive bowel regimen.  Yesterday she has been on the toilet making upwards of 10 bowel movements.  She feels thirsty.  This morning went home health came they noted that her heart rate was elevated, and irregular.  She has a history of atrial fibrillation, for which she is on oral anticoagulants.  She denies having any pain she denies chest pain, shortness of breath.  She denies abdominal cramping.  She says she awoke around 2 AM feeling like she had some palpitations, which have since resolved.          Past Medical History:   Diagnosis Date    Anticoagulant long-term use     on Plavix since May 2015    Anxiety     Arthritis     Asthma     Cataracts, bilateral     Chest pain, atypical     Colon polyp     Coronary artery disease     Diabetes  mellitus     Dry eyes     Esophageal erosions     GERD (gastroesophageal reflux disease)     Heart murmur     Hemorrhoid     High cholesterol     Hypertension     Irritable bowel syndrome     Shingles 2015    TIA (transient ischemic attack)     Use of cane as ambulatory aid      Past Surgical History:   Procedure Laterality Date    ABDOMINAL SURGERY      angiocele      2007, 2014    BREAST SURGERY      left--- a lump--- no cancer    COLONOSCOPY  2014    HERNIA REPAIR      HYSTERECTOMY  partial    1982 partial hysterectomy    left nasal polyp      left neck lymph node      nose polyp      right hip fatty mass tissue      stent to small intestine      SUPERIOR VENA CAVA ANGIOPLASTY / STENTING      TONSILLECTOMY      TOTAL ABDOMINAL HYSTERECTOMY      2014    TOTAL KNEE ARTHROPLASTY Bilateral     UPPER GASTROINTESTINAL ENDOSCOPY  2014     Family History   Problem Relation Age of Onset    Colon cancer Neg Hx     Inflammatory bowel disease Neg Hx      Social History   Substance Use Topics    Smoking status: Never Smoker    Smokeless tobacco: Not on file    Alcohol use No     Review of Systems   Endocrine: Negative.    Allergic/Immunologic: Negative.    Neurological: Negative.    All other systems reviewed and are negative.      Physical Exam     Initial Vitals [06/16/17 0900]   BP Pulse Resp Temp SpO2   130/88 104 20 98.1 °F (36.7 °C) 99 %     Physical Exam    Nursing note and vitals reviewed.  Constitutional: She appears well-developed and well-nourished. She appears distressed.   HENT:   Head: Normocephalic and atraumatic.   Right Ear: External ear normal.   Left Ear: External ear normal.   Eyes: Conjunctivae and EOM are normal. Pupils are equal, round, and reactive to light.   Neck: Normal range of motion. Neck supple.   Cardiovascular: Normal heart sounds.   Tachycardic and irregular   Pulmonary/Chest: Breath sounds normal.   Abdominal: Soft.   Appropriately tender, no guarding.  Surgical sites intact w/o drainage or purulence. No rebound.   Musculoskeletal: Normal range of motion.   Neurological: She is alert and oriented to person, place, and time. She has normal strength. No cranial nerve deficit.   Skin: Skin is warm and dry.   Psychiatric: She has a normal mood and affect. Her behavior is normal. Thought content normal.         ED Course   Procedures  Labs Reviewed   CBC W/ AUTO DIFFERENTIAL - Abnormal; Notable for the following:        Result Value    MCH 32.1 (*)     All other components within normal limits   COMPREHENSIVE METABOLIC PANEL - Abnormal; Notable for the following:     CO2 32 (*)     Glucose 129 (*)     BUN, Bld 7 (*)     Albumin 3.4 (*)     Alkaline Phosphatase 49 (*)     All other components within normal limits   B-TYPE NATRIURETIC PEPTIDE - Abnormal; Notable for the following:      (*)     All other components within normal limits   TROPONIN I             Medical Decision Making:   Initial Assessment:   72-year-old female with a history of long-standing atrial fibrillation, here with diarrhea after large bowel regimine  Differential Diagnosis:   Hypotension, anemia given the fact the patient is on oral anticoagulants, versus renal failure hypokalemia  Clinical Tests:   Lab Tests: Ordered and Reviewed  Radiological Study: Ordered and Reviewed  Medical Tests: Ordered and Reviewed  ED Management:  Patient felt better and tachycardia resolved with IV fluids.  I discussed with patient that since she is always in atrial fibrillation the fact that her heart rate was elevated, required evaluation for what was causing it.  It is reasonable given the fact the patient has had more than 10 episodes of bowel movements in the last day after taking such a stool regimen the dehydration would explain it.  She had resolution of symptoms with IV fluids both symptomatically and objectively by her heart rate.  Patient was recommended to orally rehydrate, she was not given any  prescriptions for medications.  She was eating and drinking upon discharge.  She was instructed on reasons to return to the emergency department.                   ED Course     Clinical Impression:   The primary encounter diagnosis was Dehydration. A diagnosis of Atrial fibrillation with RVR was also pertinent to this visit.          Geraldine Howell MD  06/16/17 0569

## 2017-06-16 NOTE — ED NOTES
Pt presents with c/o A Fib RVRl. Was sent by home health nurse for evaluation. Pt has cardiac hx. Family states she has been in a fib for past few days. Pt also had hernia repair surgery on Monday. Pt has also had approx 8-9 episodes of diarrhea since yesterday afternoon after taking stool softeners and laxatives from surgery. Pt placed on cardiac monitor, bp cuff and continuous pulse ox. Family at bedside. Side rails up x 2, call light within reach, fall risk socks applied.

## 2017-06-22 ENCOUNTER — HOSPITAL ENCOUNTER (EMERGENCY)
Facility: HOSPITAL | Age: 72
Discharge: HOME | End: 2017-06-22
Attending: EMERGENCY MEDICINE
Payer: MEDICARE

## 2017-06-22 VITALS
SYSTOLIC BLOOD PRESSURE: 129 MMHG | RESPIRATION RATE: 18 BRPM | HEIGHT: 64 IN | WEIGHT: 222 LBS | TEMPERATURE: 98 F | HEART RATE: 87 BPM | BODY MASS INDEX: 37.9 KG/M2 | DIASTOLIC BLOOD PRESSURE: 81 MMHG | OXYGEN SATURATION: 99 %

## 2017-06-22 DIAGNOSIS — E86.0 DEHYDRATION: ICD-10-CM

## 2017-06-22 DIAGNOSIS — R07.9 CHEST PAIN, UNSPECIFIED TYPE: Primary | ICD-10-CM

## 2017-06-22 LAB
ALBUMIN SERPL BCP-MCNC: 3.2 G/DL
ALP SERPL-CCNC: 54 U/L
ALT SERPL W/O P-5'-P-CCNC: 20 U/L
ANION GAP SERPL CALC-SCNC: 10 MMOL/L
AST SERPL-CCNC: 25 U/L
BASOPHILS # BLD AUTO: 0.04 K/UL
BASOPHILS NFR BLD: 0.4 %
BILIRUB SERPL-MCNC: 0.6 MG/DL
BILIRUB UR QL STRIP: NEGATIVE
BNP SERPL-MCNC: 184 PG/ML
BUN SERPL-MCNC: 15 MG/DL
CALCIUM SERPL-MCNC: 9.5 MG/DL
CHLORIDE SERPL-SCNC: 94 MMOL/L
CLARITY UR: CLEAR
CO2 SERPL-SCNC: 30 MMOL/L
COLOR UR: YELLOW
CREAT SERPL-MCNC: 0.8 MG/DL
D DIMER PPP IA.FEU-MCNC: 0.64 MG/L FEU
DIFFERENTIAL METHOD: ABNORMAL
DIGOXIN SERPL-MCNC: 0.5 NG/ML
EOSINOPHIL # BLD AUTO: 0 K/UL
EOSINOPHIL NFR BLD: 0.3 %
ERYTHROCYTE [DISTWIDTH] IN BLOOD BY AUTOMATED COUNT: 13.6 %
EST. GFR  (AFRICAN AMERICAN): >60 ML/MIN/1.73 M^2
EST. GFR  (NON AFRICAN AMERICAN): >60 ML/MIN/1.73 M^2
GLUCOSE SERPL-MCNC: 143 MG/DL
GLUCOSE UR QL STRIP: NEGATIVE
HCT VFR BLD AUTO: 39.7 %
HGB BLD-MCNC: 13 G/DL
HGB UR QL STRIP: NEGATIVE
KETONES UR QL STRIP: NEGATIVE
LEUKOCYTE ESTERASE UR QL STRIP: NEGATIVE
LYMPHOCYTES # BLD AUTO: 1.9 K/UL
LYMPHOCYTES NFR BLD: 18.3 %
MCH RBC QN AUTO: 31.1 PG
MCHC RBC AUTO-ENTMCNC: 32.7 %
MCV RBC AUTO: 95 FL
MONOCYTES # BLD AUTO: 1.2 K/UL
MONOCYTES NFR BLD: 11.9 %
NEUTROPHILS # BLD AUTO: 6.9 K/UL
NEUTROPHILS NFR BLD: 68.5 %
NITRITE UR QL STRIP: NEGATIVE
PH UR STRIP: 8 [PH] (ref 5–8)
PLATELET # BLD AUTO: 404 K/UL
PMV BLD AUTO: 9.5 FL
POCT GLUCOSE: 155 MG/DL (ref 70–110)
POTASSIUM SERPL-SCNC: 4.5 MMOL/L
PROT SERPL-MCNC: 7.1 G/DL
PROT UR QL STRIP: ABNORMAL
RBC # BLD AUTO: 4.18 M/UL
SODIUM SERPL-SCNC: 134 MMOL/L
SP GR UR STRIP: 1.01 (ref 1–1.03)
TROPONIN I SERPL DL<=0.01 NG/ML-MCNC: 0.01 NG/ML
TROPONIN I SERPL DL<=0.01 NG/ML-MCNC: 0.03 NG/ML
TROPONIN I SERPL DL<=0.01 NG/ML-MCNC: <0.006 NG/ML
URN SPEC COLLECT METH UR: ABNORMAL
UROBILINOGEN UR STRIP-ACNC: NEGATIVE EU/DL
WBC # BLD AUTO: 10.14 K/UL

## 2017-06-22 PROCEDURE — 96374 THER/PROPH/DIAG INJ IV PUSH: CPT

## 2017-06-22 PROCEDURE — 81003 URINALYSIS AUTO W/O SCOPE: CPT

## 2017-06-22 PROCEDURE — 85379 FIBRIN DEGRADATION QUANT: CPT

## 2017-06-22 PROCEDURE — 82962 GLUCOSE BLOOD TEST: CPT

## 2017-06-22 PROCEDURE — 80162 ASSAY OF DIGOXIN TOTAL: CPT

## 2017-06-22 PROCEDURE — 80053 COMPREHEN METABOLIC PANEL: CPT

## 2017-06-22 PROCEDURE — 99284 EMERGENCY DEPT VISIT MOD MDM: CPT | Mod: 25

## 2017-06-22 PROCEDURE — 96361 HYDRATE IV INFUSION ADD-ON: CPT

## 2017-06-22 PROCEDURE — 96375 TX/PRO/DX INJ NEW DRUG ADDON: CPT

## 2017-06-22 PROCEDURE — 83880 ASSAY OF NATRIURETIC PEPTIDE: CPT

## 2017-06-22 PROCEDURE — 63600175 PHARM REV CODE 636 W HCPCS: Performed by: EMERGENCY MEDICINE

## 2017-06-22 PROCEDURE — 85025 COMPLETE CBC W/AUTO DIFF WBC: CPT

## 2017-06-22 PROCEDURE — 93005 ELECTROCARDIOGRAM TRACING: CPT

## 2017-06-22 PROCEDURE — 93010 ELECTROCARDIOGRAM REPORT: CPT | Mod: S$GLB,,, | Performed by: INTERNAL MEDICINE

## 2017-06-22 PROCEDURE — 25000003 PHARM REV CODE 250: Performed by: EMERGENCY MEDICINE

## 2017-06-22 PROCEDURE — 84484 ASSAY OF TROPONIN QUANT: CPT | Mod: 91

## 2017-06-22 RX ORDER — ASPIRIN 325 MG
325 TABLET ORAL
Status: DISCONTINUED | OUTPATIENT
Start: 2017-06-22 | End: 2017-06-22 | Stop reason: HOSPADM

## 2017-06-22 RX ORDER — SODIUM CHLORIDE 9 MG/ML
1000 INJECTION, SOLUTION INTRAVENOUS
Status: COMPLETED | OUTPATIENT
Start: 2017-06-22 | End: 2017-06-22

## 2017-06-22 RX ORDER — ONDANSETRON 2 MG/ML
4 INJECTION INTRAMUSCULAR; INTRAVENOUS
Status: COMPLETED | OUTPATIENT
Start: 2017-06-22 | End: 2017-06-22

## 2017-06-22 RX ORDER — KETOROLAC TROMETHAMINE 30 MG/ML
15 INJECTION, SOLUTION INTRAMUSCULAR; INTRAVENOUS
Status: COMPLETED | OUTPATIENT
Start: 2017-06-22 | End: 2017-06-22

## 2017-06-22 RX ADMIN — KETOROLAC TROMETHAMINE 15 MG: 30 INJECTION, SOLUTION INTRAMUSCULAR at 12:06

## 2017-06-22 RX ADMIN — ONDANSETRON 4 MG: 2 INJECTION INTRAMUSCULAR; INTRAVENOUS at 12:06

## 2017-06-22 RX ADMIN — SODIUM CHLORIDE 1000 ML: 0.9 INJECTION, SOLUTION INTRAVENOUS at 11:06

## 2017-06-22 RX ADMIN — SODIUM CHLORIDE 500 ML: 0.9 INJECTION, SOLUTION INTRAVENOUS at 11:06

## 2017-06-22 NOTE — ED PROVIDER NOTES
"Encounter Date: 6/22/2017       History     Chief Complaint   Patient presents with    Chest Pain     x1 week; worsensed this morning at 0900; mid sternal and radiates to right; worse with inspiration; described as "stabbing"; also c/o dizziness and generalized weakness, and shortness of breath     72-year-old female presents the emergency department complaining of chest pain.  Reports onset a few days ago, somewhat worse today.  Reports a substernal aching pain that radiates across each side of her chest at different times.  Reports it is worse with deep breath and certain movements.  No alleviating factors reported.  Patient also reports that this morning after going to the bathroom, her legs felt very weak and she felt lightheaded.  Reports nausea as well.  The symptoms have not recurred.  Reports feeling like she has to breathe harder, but does not report she feels short of breath.  No other symptoms reported.  Denies any headache, vision changes, dizziness, focal numbness or weakness, sore throat, cough, congestion, abdominal pain, constipation, diarrhea, dysuria, hematuria, fever, chills.          Review of patient's allergies indicates:   Allergen Reactions    Celebrex [celecoxib] Shortness Of Breath    Dicyclomine Hives    Adhesive Dermatitis    Avelox [moxifloxacin] Swelling    Cilostazol Other (See Comments)     Elevates blood pressure    Eryc [erythromycin] Other (See Comments)     unknown    Iodine and iodide containing products Hives    Keflex [cephalexin] Hives    Meclomen      rashes    Meloxicam      Ear ringing    Metoclopramide Other (See Comments)     High blood pressure    Sulfa (sulfonamide antibiotics) Itching     Past Medical History:   Diagnosis Date    Anticoagulant long-term use     on Plavix since May 2015    Anxiety     Arthritis     Asthma     Cataracts, bilateral     Chest pain, atypical     Colon polyp     Coronary artery disease     Diabetes mellitus     Dry " eyes     Esophageal erosions     GERD (gastroesophageal reflux disease)     Heart murmur     Hemorrhoid     High cholesterol     Hypertension     Irritable bowel syndrome     Shingles 2015    TIA (transient ischemic attack)     Use of cane as ambulatory aid      Past Surgical History:   Procedure Laterality Date    ABDOMINAL SURGERY      angiocele      2007, 2014    BREAST SURGERY      left--- a lump--- no cancer    COLONOSCOPY  2014    HERNIA REPAIR      HYSTERECTOMY  partial    1982 partial hysterectomy    left nasal polyp      left neck lymph node      nose polyp      right hip fatty mass tissue      stent to small intestine      SUPERIOR VENA CAVA ANGIOPLASTY / STENTING      TONSILLECTOMY      TOTAL ABDOMINAL HYSTERECTOMY      2014    TOTAL KNEE ARTHROPLASTY Bilateral     UPPER GASTROINTESTINAL ENDOSCOPY  2014     Family History   Problem Relation Age of Onset    Colon cancer Neg Hx     Inflammatory bowel disease Neg Hx      Social History   Substance Use Topics    Smoking status: Never Smoker    Smokeless tobacco: Not on file    Alcohol use No     Review of Systems   Constitutional: Negative for chills, fatigue and fever.   HENT: Negative for congestion, sore throat and voice change.    Eyes: Negative for photophobia, pain and redness.   Respiratory: Negative for cough, choking and chest tightness.    Cardiovascular: Positive for chest pain. Negative for palpitations and leg swelling.   Gastrointestinal: Positive for nausea. Negative for abdominal pain and vomiting.   Genitourinary: Negative for dysuria, frequency and urgency.   Musculoskeletal: Negative for back pain, neck pain and neck stiffness.   Neurological: Positive for light-headedness. Negative for seizures, speech difficulty, numbness and headaches.   All other systems reviewed and are negative.      Physical Exam     Initial Vitals [06/22/17 1009]   BP Pulse Resp Temp SpO2   111/82 108 (!) 22 97.8 °F (36.6 °C) 98 %       MAP       91.67         Physical Exam    Nursing note and vitals reviewed.  Constitutional: She appears well-developed and well-nourished. No distress.   Somewhat elderly and frail   HENT:   Head: Normocephalic and atraumatic.   Oropharynx clear; dry mucous membranes   Eyes: Conjunctivae and EOM are normal. Pupils are equal, round, and reactive to light.   Neck: Normal range of motion. Neck supple. No tracheal deviation present.   Cardiovascular: Regular rhythm, normal heart sounds and intact distal pulses.   Irregular rhythm   Pulmonary/Chest: Breath sounds normal. No respiratory distress. She has no wheezes. She has no rhonchi. She has no rales.   Abdominal: Soft. Bowel sounds are normal. She exhibits no distension. There is no tenderness. There is no rebound and no guarding.   Musculoskeletal: Normal range of motion. She exhibits edema (1+ pitting edema to bilateral lower extremities). She exhibits no tenderness.   Neurological: She is alert and oriented to person, place, and time. She has normal strength. No cranial nerve deficit or sensory deficit.   Skin: Skin is warm and dry. Capillary refill takes less than 2 seconds.         ED Course   Procedures  Labs Reviewed   CBC W/ AUTO DIFFERENTIAL - Abnormal; Notable for the following:        Result Value    MCH 31.1 (*)     Platelets 404 (*)     Mono # 1.2 (*)     All other components within normal limits   COMPREHENSIVE METABOLIC PANEL - Abnormal; Notable for the following:     Sodium 134 (*)     Chloride 94 (*)     CO2 30 (*)     Glucose 143 (*)     Albumin 3.2 (*)     Alkaline Phosphatase 54 (*)     All other components within normal limits   B-TYPE NATRIURETIC PEPTIDE - Abnormal; Notable for the following:      (*)     All other components within normal limits   POCT GLUCOSE - Abnormal; Notable for the following:     POCT Glucose 155 (*)     All other components within normal limits   TROPONIN I   URINALYSIS   TROPONIN I     EKG Readings:  (Independently Interpreted)   Initial Reading: No STEMI. Previous EKG: Compared with most recent EKG Previous EKG Date: 6/16/17 (Nonspecific changes). Rhythm: Atrial Fibrillation. Heart Rate: 105. Ectopy: No Ectopy. Conduction: Normal. ST Segments: Normal ST Segments. T Waves: Normal. Axis: Normal.       X-Rays:   Independently Interpreted Readings:   Chest X-Ray: Chest x-ray interpreted by radiologist and visualized by me:     Imaging Results          X-Ray Chest 1 View (Final result)  Result time 06/22/17 11:29:37   Procedure changed from X-Ray Chest PA And Lateral     Final result by Kerrie Lopez MD (06/22/17 11:29:37)                 Impression:    Clear lungs.      Electronically signed by: KERRIE LOPEZ MD  Date:     06/22/17  Time:    11:29              Narrative:    EXAM:  AP CHEST RADIOGRAPH.     CLINICAL INDICATION:  Chest Pain.    TECHNIQUE: Single AP view of the chest was obtained.     COMPARISON: Prior from 6/16/17    FINDINGS: The mediastinal structures are midline.  The cardiac silhouette is prominent but difficult to evaluate due to AP technique.  There is atherosclerotic calcification of the aortic arch. The lungs appear grossly clear.      There is degenerative change of the spine and shoulders.                              Medical Decision Making:   Initial Assessment:   72-year-old female present see emergency department complaining of substernal sharp chest pain  Differential Diagnosis:   ACS, pulmonary and was him, musculoskeletal, gastritis  Independently Interpreted Test(s):   I have ordered and independently interpreted X-rays - see prior notes.  I have ordered and independently interpreted EKG Reading(s) - see prior notes  Clinical Tests:   Lab Tests: Reviewed       <> Summary of Lab: Benign, age-adjusted d-dimer negative  ED Management:  Patient given IV fluids as well as Toradol.  Reports significant improvement in her symptoms at this time.  Complains of no chest pain or  shortness of breath at the moment.  I discussed the case with her cardiologist, and agree that we should also add a d-dimer.  Her age-adjusted d-dimer is negative.  Given her stable vitals and lack of shortness of breath as well as her full anticoagulation, I did not believe her pretest probability to be high enough to exclude age-adjusted d-dimer.  Her repeat troponin has similarly improved.  I discussed disposition with the patient and her family at length, explaining the reasoning behind that repeat testing as well as the plan to discharge with follow-up with her cardiologist tomorrow, who will be in his [place] office tomorrow.  Patient reports that she usually has very little difficulty in getting a same day or next day appointment.  I instructed her to immediately return should she be unable to procure a follow-up appointment or with recurrent or worsening chest pain, shortness of breath, or for any other emergent symptoms.  She and her family expressed good understanding and are comfortable with discharge at this time.                   ED Course     Clinical Impression:   The primary encounter diagnosis was Chest pain, unspecified type. A diagnosis of Dehydration was also pertinent to this visit.    Disposition:   Disposition: Discharged  Condition: Stable                        iJm Madsen MD  06/22/17 [id number]

## 2017-06-22 NOTE — ED NOTES
Assumed care of pt. Pt states she began having weakness to her bilateral legs, dizziness, and shortness of breath this morning at about 0900 after going to the restroom. States she continues to feel weak and short of breath. Also c/o pain to entire left chest, worse with deep breaths. Pt states she was seen here on Friday for afib and dehydration. Atrial fib noted on monitor at this time. Skin is warm, dry. Pt recently had hernia surgery at [facility] on 6/12.

## 2017-06-23 PROBLEM — I48.20 CHRONIC ATRIAL FIBRILLATION: Status: ACTIVE | Noted: 2017-06-23

## 2017-06-23 PROBLEM — M94.0 COSTOCHONDRITIS: Status: ACTIVE | Noted: 2017-06-23

## 2017-06-27 ENCOUNTER — OFFICE VISIT (OUTPATIENT)
Dept: SURGERY | Facility: CLINIC | Age: 72
End: 2017-06-27
Payer: MEDICARE

## 2017-06-27 VITALS
WEIGHT: 224 LBS | DIASTOLIC BLOOD PRESSURE: 79 MMHG | TEMPERATURE: 98 F | SYSTOLIC BLOOD PRESSURE: 136 MMHG | HEIGHT: 64 IN | BODY MASS INDEX: 38.24 KG/M2 | HEART RATE: 83 BPM

## 2017-06-27 DIAGNOSIS — Z09 POSTOP CHECK: Primary | ICD-10-CM

## 2017-06-27 PROCEDURE — 99024 POSTOP FOLLOW-UP VISIT: CPT | Mod: S$GLB,,, | Performed by: SURGERY

## 2017-06-27 PROCEDURE — 99999 PR PBB SHADOW E&M-EST. PATIENT-LVL III: CPT | Mod: PBBFAC,,, | Performed by: SURGERY

## 2017-06-27 NOTE — PROGRESS NOTES
"Adriana Strong is a 72 y.o. female patient.   No diagnosis found.  Past Medical History:   Diagnosis Date    Anticoagulant long-term use     on Plavix since May 2015    Anxiety     Arthritis     Asthma     Cataracts, bilateral     Chest pain, atypical     Colon polyp     Coronary artery disease     Diabetes mellitus     Dry eyes     Esophageal erosions     GERD (gastroesophageal reflux disease)     Heart murmur     Hemorrhoid     High cholesterol     Hypertension     Irritable bowel syndrome     Shingles 2015    TIA (transient ischemic attack)     Use of cane as ambulatory aid      No past surgical history pertinent negatives on file.  Scheduled Meds:  Continuous Infusions:  PRN Meds:    Review of patient's allergies indicates:   Allergen Reactions    Celebrex [celecoxib] Shortness Of Breath    Dicyclomine Hives    Adhesive Dermatitis    Avelox [moxifloxacin] Swelling    Cilostazol Other (See Comments)     Elevates blood pressure    Eryc [erythromycin] Other (See Comments)     unknown    Iodine and iodide containing products Hives    Keflex [cephalexin] Hives    Meclomen      rashes    Meloxicam      Ear ringing    Metoclopramide Other (See Comments)     High blood pressure    Sulfa (sulfonamide antibiotics) Itching     There are no hospital problems to display for this patient.    Blood pressure 136/79, pulse 83, temperature 98.3 °F (36.8 °C), height 5' 4" (1.626 m), weight 101.6 kg (224 lb), last menstrual period 01/21/1973.    Subjective S/p laparoscopic incisional hernia repair with mesh 6/12/17.  Her postop course has been complicated by dehydration caused by cathartics improved with hydration and chest pain for which she has seen her cardiologist but has had no ekg changes.  She currently is ok but had some nausea this morning controlled with zofran.  She has no abdominal pain.  Objective Wounds clear, no masses.   Assessment & Plan  She is happy with her procedure but has had " some problems with constipation and chest pain.  Light duty one more month and follow up prn.       Jay Milligan MD  6/27/2017

## 2017-06-27 NOTE — LETTER
[facility] - General Surgery  [address]  [place] [address]  Phone: [phone] June 27, 2017      Krzysztof Mcgraw MD  [address]  [address]  [place] [address]    Patient: Adriana Strong   MR Number: [id number]   YOB: 1945   Date of Visit: 6/27/2017     Dear Dr. Mcgraw:    Thank you for referring Adriana Strong to me for evaluation. Below are the relevant portions of my assessment and plan of care.    Patient is status post laparoscopic incisional hernia repair with mesh 6/12/17.  Her post-op course has been complicated by dehydration caused by cathartics. It has  improved with hydration. Chest pain for which she has seen by her Cardiologist, but has had no EKG changes.  She currently is ok but had some nausea this morning controlled with Zofran.  She has no abdominal pain.    PLAN:  She is happy with her procedure, but has had some problems with constipation and chest pain.  Light duty for one more month and follow up PRN.    If you have questions, please do not hesitate to call me. I look forward to following Adriana along with you.    Sincerely,      Jay Milligan MD   Section Head - General, Laparoscopic, Bariatric  Acute Care and Oncologic Surgery   - Surgical Weight Loss Program  [facility]    MARIE/rachel

## 2017-06-29 ENCOUNTER — HOSPITAL ENCOUNTER (OUTPATIENT)
Dept: CARDIOLOGY | Facility: HOSPITAL | Age: 72
Discharge: HOME | End: 2017-06-29
Attending: INTERNAL MEDICINE
Payer: MEDICARE

## 2017-06-29 DIAGNOSIS — J45.40 MODERATE PERSISTENT ASTHMA WITHOUT COMPLICATION: ICD-10-CM

## 2017-06-29 DIAGNOSIS — I48.20 CHRONIC ATRIAL FIBRILLATION: ICD-10-CM

## 2017-06-29 DIAGNOSIS — R07.9 CHEST PAIN, UNSPECIFIED TYPE: ICD-10-CM

## 2017-06-29 LAB
DIASTOLIC DYSFUNCTION: NO
RETIRED EF AND QEF - SEE NOTES: 60 (ref 55–65)

## 2017-06-29 PROCEDURE — 93351 STRESS TTE COMPLETE: CPT

## 2017-06-29 NOTE — PROGRESS NOTES
1230 Pt on table, ready for test.   Reviewed multiple allergies listed in chart w pt, verbalized agreement, denied any other allergies. Denies hx of glaucoma.  1245 PIV established to R shoulder 22g gauze.   1250 Connected to monitor EKG and NIBP  1304 Dr Rankin at bedside to explain procedure and obtain consent.  1305 Test started per protocol, see EKG sheet for VS  1309 Dobutamine at 10mcg to increase HR, target HR 124bpm.  1312 Dobutamine at 20mcg, pt tolerating well. Leg exercises and hand ball given. 1315 Target HR reached, tech taking pics at this time. Pt tolerating   1316 Testing phase completed, dobutamine off, NS up at rapid rate for recovery phase.  1331 Recovery phase completed. Pt states feels normal, no distress, NS d/c'd, approx 200cc infused. Iv d/c'd, pt released to cardio.

## 2017-08-29 ENCOUNTER — PATIENT MESSAGE (OUTPATIENT)
Dept: ADMINISTRATIVE | Facility: OTHER | Age: 72
End: 2017-08-29

## 2017-12-05 DIAGNOSIS — I10 ESSENTIAL HYPERTENSION: ICD-10-CM

## 2017-12-05 DIAGNOSIS — I48.91 ATRIAL FIBRILLATION WITH RVR: ICD-10-CM

## 2017-12-05 RX ORDER — FUROSEMIDE 40 MG/1
40 TABLET ORAL DAILY
Qty: 90 TABLET | Refills: 3 | Status: SHIPPED | OUTPATIENT
Start: 2017-12-05 | End: 2018-01-16 | Stop reason: SDUPTHER

## 2017-12-05 RX ORDER — DIGOXIN 125 MCG
125 TABLET ORAL DAILY
Qty: 90 TABLET | Refills: 3 | Status: SHIPPED | OUTPATIENT
Start: 2017-12-05 | End: 2018-12-04 | Stop reason: SDUPTHER

## 2017-12-05 NOTE — TELEPHONE ENCOUNTER
----- Message from Rosalia Basurto sent at 12/5/2017 10:43 AM CST -----  Contact: Self/ [phone]  Patient called in to get medication refill but was informed by the pharmacy to call in and speak with your office.    Please call and advise.     digoxin (LANOXIN) 125 mcg tablet    furosemide (LASIX) 40 MG tablet

## 2018-01-02 RX ORDER — DILTIAZEM HYDROCHLORIDE 60 MG/1
180 CAPSULE, EXTENDED RELEASE ORAL 2 TIMES DAILY
Qty: 180 CAPSULE | Refills: 3 | Status: SHIPPED | OUTPATIENT
Start: 2018-01-02 | End: 2018-01-03 | Stop reason: SDUPTHER

## 2018-01-02 NOTE — TELEPHONE ENCOUNTER
----- Message from Rosalia Basurto sent at 1/2/2018  8:20 AM CST -----  Contact: Self/ [phone]  Patient called in to get prescription refill. Patient needs 3 month supply.    Please call and advise.     diltiaZEM (CARDIZEM SR) 60 MG Cp12    [id number] - [place], [place] - [address] AT [address]

## 2018-01-03 RX ORDER — DILTIAZEM HYDROCHLORIDE 60 MG/1
180 CAPSULE, EXTENDED RELEASE ORAL 2 TIMES DAILY
Qty: 180 CAPSULE | Refills: 3 | Status: SHIPPED | OUTPATIENT
Start: 2018-01-03 | End: 2018-04-25 | Stop reason: SDUPTHER

## 2018-01-10 ENCOUNTER — PATIENT MESSAGE (OUTPATIENT)
Dept: GASTROENTEROLOGY | Facility: CLINIC | Age: 73
End: 2018-01-10

## 2018-01-10 ENCOUNTER — TELEPHONE (OUTPATIENT)
Dept: GASTROENTEROLOGY | Facility: CLINIC | Age: 73
End: 2018-01-10

## 2018-01-16 ENCOUNTER — OFFICE VISIT (OUTPATIENT)
Dept: CARDIOLOGY | Facility: CLINIC | Age: 73
End: 2018-01-16
Payer: MEDICARE

## 2018-01-16 VITALS
SYSTOLIC BLOOD PRESSURE: 132 MMHG | WEIGHT: 215.69 LBS | BODY MASS INDEX: 36.82 KG/M2 | DIASTOLIC BLOOD PRESSURE: 80 MMHG | HEIGHT: 64 IN | HEART RATE: 82 BPM

## 2018-01-16 DIAGNOSIS — I48.20 CHRONIC ATRIAL FIBRILLATION: Primary | ICD-10-CM

## 2018-01-16 DIAGNOSIS — I35.0 AORTIC STENOSIS, MILD: ICD-10-CM

## 2018-01-16 DIAGNOSIS — R06.02 SHORTNESS OF BREATH: ICD-10-CM

## 2018-01-16 DIAGNOSIS — R60.0 LOCALIZED EDEMA: ICD-10-CM

## 2018-01-16 DIAGNOSIS — J45.20 MILD INTERMITTENT ASTHMA WITHOUT COMPLICATION: ICD-10-CM

## 2018-01-16 DIAGNOSIS — Z86.73 OLD LACUNAR STROKE WITHOUT LATE EFFECT: ICD-10-CM

## 2018-01-16 DIAGNOSIS — E78.5 HYPERLIPIDEMIA, UNSPECIFIED HYPERLIPIDEMIA TYPE: ICD-10-CM

## 2018-01-16 DIAGNOSIS — I10 ESSENTIAL HYPERTENSION: ICD-10-CM

## 2018-01-16 DIAGNOSIS — I48.91 ATRIAL FIBRILLATION WITH RVR: ICD-10-CM

## 2018-01-16 DIAGNOSIS — K55.1 CHRONIC MESENTERIC ISCHEMIA: ICD-10-CM

## 2018-01-16 DIAGNOSIS — Z79.01 CHRONIC ANTICOAGULATION: ICD-10-CM

## 2018-01-16 PROCEDURE — 93000 ELECTROCARDIOGRAM COMPLETE: CPT | Mod: S$GLB,,, | Performed by: INTERNAL MEDICINE

## 2018-01-16 PROCEDURE — 99999 PR PBB SHADOW E&M-EST. PATIENT-LVL II: CPT | Mod: PBBFAC,,, | Performed by: INTERNAL MEDICINE

## 2018-01-16 PROCEDURE — 99214 OFFICE O/P EST MOD 30 MIN: CPT | Mod: S$GLB,,, | Performed by: INTERNAL MEDICINE

## 2018-01-16 RX ORDER — LISINOPRIL 20 MG/1
20 TABLET ORAL DAILY
Qty: 90 TABLET | Refills: 3 | Status: SHIPPED | OUTPATIENT
Start: 2018-01-16 | End: 2019-01-24 | Stop reason: SDUPTHER

## 2018-01-16 RX ORDER — FUROSEMIDE 40 MG/1
40 TABLET ORAL DAILY
Qty: 90 TABLET | Refills: 3 | Status: SHIPPED | OUTPATIENT
Start: 2018-01-16 | End: 2019-01-24 | Stop reason: SDUPTHER

## 2018-01-16 RX ORDER — TRAMADOL HYDROCHLORIDE 50 MG/1
50 TABLET ORAL EVERY 6 HOURS PRN
Qty: 60 TABLET | Refills: 0 | Status: SHIPPED | OUTPATIENT
Start: 2018-01-16 | End: 2018-01-18 | Stop reason: SDUPTHER

## 2018-01-16 NOTE — PROGRESS NOTES
Patient, Adriana Strong (MRN #[id number]), presented with a recorded BMI of 37.02 kg/m^2 and a documented comorbidity(s):  - Hypertension  - Hyperlipidemia  - Atrial Fibrillation  to which the severe obesity is a contributing factor. This is consistent with the definition of severe obesity (BMI 35.0-35.9) with comorbidity (ICD-10 E66.01, Z68.35). The patient's severe obesity was monitored, evaluated, addressed and/or treated. This addendum to the medical record is made on 01/16/2018.

## 2018-01-16 NOTE — PROGRESS NOTES
"  Subjective:      Patient ID: Adriana Strong is a 73 y.o. female.    Chief Complaint: Follow-up    HPI:  Recenly saw Dr Go.  Does exercise bike at home. "Chronically short of breath walking a block or so.  Occasional tightness in the chest when lying down or sitting down.   Had hernia surgery last June. Has appt to see Dr Joana Hernandez to check mesenteric artery stents. Pt takes tramadol for abdominal pain. The pain is epigastric in location and similar to the pain pt was experiencing prior to her mesenteric stents.  The abdominal pain seems to occur after eating.  Review of Systems   Cardiovascular: Positive for chest pain (Mild, chronic, non-exertional tightness in the chest. ) and dyspnea on exertion. Negative for claudication, irregular heartbeat, leg swelling, near-syncope, orthopnea, palpitations and syncope.    "I have real bad abdominal pains before having a bowel movement. "  Pt reports elevation of blood pressure when in pain.     Pt reports she had lab 2 weeks ago with Dr Go--"kidneys and liver were perfect" per pt. HgbA1C 6.1  Past Medical History:   Diagnosis Date    Anticoagulant long-term use     on Plavix since May 2015    Anxiety     Arthritis     Asthma     Cataracts, bilateral     Chest pain, atypical     Colon polyp     Coronary artery disease     Diabetes mellitus     Dry eyes     Esophageal erosions     GERD (gastroesophageal reflux disease)     Heart murmur     Hemorrhoid     High cholesterol     Hypertension     Irritable bowel syndrome     Shingles 2015    TIA (transient ischemic attack)     Use of cane as ambulatory aid         Past Surgical History:   Procedure Laterality Date    ABDOMINAL SURGERY      angiocele      2007, 2014    BREAST SURGERY      left--- a lump--- no cancer    COLONOSCOPY  2014    HERNIA REPAIR      HYSTERECTOMY  partial    1982 partial hysterectomy    left nasal polyp      left neck lymph node      nose polyp      right hip " fatty mass tissue      stent to small intestine      SUPERIOR VENA CAVA ANGIOPLASTY / STENTING      TONSILLECTOMY      TOTAL ABDOMINAL HYSTERECTOMY      2014    TOTAL KNEE ARTHROPLASTY Bilateral     UPPER GASTROINTESTINAL ENDOSCOPY  2014       Family History   Problem Relation Age of Onset    Colon cancer Neg Hx     Inflammatory bowel disease Neg Hx        Social History     Social History    Marital status:      Spouse name: N/A    Number of children: N/A    Years of education: N/A     Social History Main Topics    Smoking status: Never Smoker    Smokeless tobacco: Never Used    Alcohol use No    Drug use: No    Sexual activity: Not Asked     Other Topics Concern    None     Social History Narrative    None       Current Outpatient Prescriptions on File Prior to Visit   Medication Sig Dispense Refill    acetaminophen (TYLENOL ARTHRITIS) 650 MG TbSR Take 1,300 mg by mouth 2 (two) times daily.      albuterol (ACCUNEB) 1.25 mg/3 mL Nebu Take scheduled q4 for the next two days while at home then resume prn dosing 3 mL 1    albuterol (VENTOLIN HFA) 90 mcg/actuation inhaler Inhale 2 puffs into the lungs every 6 (six) hours as needed for Wheezing. Rescue      apixaban 5 mg Tab Take 1 tablet (5 mg total) by mouth 2 (two) times daily. 180 tablet 3    ascorbic acid (VITAMIN C) 500 MG tablet Take 500 mg by mouth once daily.      azelastine (ASTELIN) 137 mcg nasal spray 1 spray by Nasal route 2 (two) times daily.      budesonide-formoterol 160-4.5 mcg (SYMBICORT) 160-4.5 mcg/actuation HFAA Inhale 2 puffs into the lungs every 12 (twelve) hours. Controller      calcium carbonate (OS-MICHAEL) 600 mg (1,500 mg) Tab Take 1,200 mg by mouth once daily.      clopidogrel (PLAVIX) 75 mg tablet Take 1 tablet (75 mg total) by mouth once daily. 90 tablet 4    cyanocobalamin (VITAMIN B-12) 1000 MCG tablet Take 100 mcg by mouth once daily.      digoxin (LANOXIN) 125 mcg tablet Take 1 tablet (125 mcg total) by  mouth once daily. 90 tablet 3    diltiaZEM (CARDIZEM SR) 60 MG Cp12 Take 3 capsules (180 mg total) by mouth 2 (two) times daily. 180 capsule 3    docusate sodium (COLACE) 100 MG capsule Take 1 capsule (100 mg total) by mouth 2 (two) times daily. 60 capsule 1    fexofenadine (ALLEGRA) 60 MG tablet Take 60 mg by mouth every morning.      fish oil-omega-3 fatty acids 300-1,000 mg capsule Take 1 g by mouth once daily.       fluticasone (FLONASE) 50 mcg/actuation nasal spray 1 spray by Each Nare route every morning.       lorazepam (ATIVAN) 0.5 MG tablet Take 0.5 mg by mouth every morning.       magnesium 250 mg Tab Take 250 mg by mouth. 3 in am and 3 pm      montelukast (SINGULAIR) 10 mg tablet Take 10 mg by mouth every evening.      oxybutynin (DITROPAN-XL) 5 MG TR24 Take 5 mg by mouth every evening.       pantoprazole (PROTONIX) 40 MG tablet Take 1 tablet (40 mg total) by mouth once daily. (Patient taking differently: Take 40 mg by mouth every morning. ) 90 tablet 3    polyethylene glycol (GLYCOLAX) 17 gram PwPk Take 17 g by mouth once daily.      POLYSORBATE 80/GLYCERIN (REFRESH DRY EYE THERAPY OPHT) Apply to eye 2 (two) times daily.      potassium gluconate 550 mg (90 mg) Tab Take by mouth. 2 in am and 2 pm      psyllium (METAMUCIL) powder Take 1 packet by mouth 3 (three) times daily.      [DISCONTINUED] furosemide (LASIX) 40 MG tablet Take 1 tablet (40 mg total) by mouth once daily. 90 tablet 3    [DISCONTINUED] lisinopril (PRINIVIL,ZESTRIL) 20 MG tablet Take 1 tablet (20 mg total) by mouth once daily. (Patient taking differently: Take 20 mg by mouth every morning. ) 90 tablet 3    [DISCONTINUED] tramadol (ULTRAM) 50 mg tablet Take 1 tablet (50 mg total) by mouth every 6 (six) hours as needed for Pain. 60 tablet 3    [DISCONTINUED] budesonide-formoterol 80-4.5 mcg (SYMBICORT) 80-4.5 mcg/actuation HFAA Inhale 2 puffs into the lungs 2 (two) times daily. Controller;   (using SAMPLE)       "[DISCONTINUED] hydrocodone-acetaminophen 5-325mg (NORCO) 5-325 mg per tablet Take 1 tablet by mouth every 4 (four) hours as needed. 60 tablet 0    [DISCONTINUED] nitrofurantoin (MACRODANTIN) 50 MG capsule Take 1 capsule (50 mg total) by mouth 4 (four) times daily. 48 capsule 0    [DISCONTINUED] ondansetron (ZOFRAN-ODT) 4 MG TbDL Take 1 tablet (4 mg total) by mouth every 6 (six) hours as needed (nausea). 30 tablet 0     No current facility-administered medications on file prior to visit.        Review of patient's allergies indicates:   Allergen Reactions    Celebrex [celecoxib] Shortness Of Breath    Dicyclomine Hives    Adhesive Dermatitis    Avelox [moxifloxacin] Swelling    Cilostazol Other (See Comments)     Elevates blood pressure    Eryc [erythromycin] Other (See Comments)     unknown    Iodine and iodide containing products Hives    Keflex [cephalexin] Hives    Meclomen      rashes    Meloxicam      Ear ringing    Metoclopramide Other (See Comments)     High blood pressure    Sulfa (sulfonamide antibiotics) Itching     Objective:     Vitals:    01/16/18 0826   BP: 132/80   BP Location: Left arm   Patient Position: Sitting   BP Method: Large (Manual)   Pulse: 82   Weight: 97.8 kg (215 lb 11.2 oz)   Height: 5' 4" (1.626 m)        Physical Exam   Constitutional: She is oriented to person, place, and time. She appears well-developed and well-nourished.   Eyes: No scleral icterus.   Neck: No JVD present. Carotid bruit is not present.   Cardiovascular: Normal rate.  An irregularly irregular rhythm present. Exam reveals no gallop.    Murmur (Iii/VI systolic murmur) heard.  Pulmonary/Chest: She has wheezes.   Abdominal: Soft. She exhibits no pulsatile midline mass. There is no hepatosplenomegaly. There is tenderness.       Musculoskeletal: She exhibits edema (trivial edema bilat).   Neurological: She is alert and oriented to person, place, and time.   Skin: Skin is warm and dry.   Psychiatric: She has a " normal mood and affect. Her behavior is normal. Judgment and thought content normal.   Vitals reviewed.   Skin of abdomen is erythematous due to using a heating pad for the abdominal pain.   Weight down 8 lbs  Note dobutamine stress echo June 2017 WNL    ECG today--atrial fibrillation with a controlled ventricular response, nonspecific STTabnormality, unchanged comapred with old tracings  Assessment:     1. Chronic atrial fibrillation    2. Essential hypertension    3. Aortic stenosis, mild    4. Old lacunar stroke without late effect    5. Mild intermittent asthma without complication    6. Hyperlipidemia, unspecified hyperlipidemia type    7. Atrial fibrillation with RVR    8. Localized edema    9. Shortness of breath    10. Chronic mesenteric ischemia    11. Chronic anticoagulation      Plan:   Adriana was seen today for follow-up.    Diagnoses and all orders for this visit:    Chronic atrial fibrillation  -     EKG 12-lead    Essential hypertension  -     lisinopril (PRINIVIL,ZESTRIL) 20 MG tablet; Take 1 tablet (20 mg total) by mouth once daily.  -     furosemide (LASIX) 40 MG tablet; Take 1 tablet (40 mg total) by mouth once daily.    Aortic stenosis, mild    Old lacunar stroke without late effect    Mild intermittent asthma without complication    Hyperlipidemia, unspecified hyperlipidemia type    Atrial fibrillation with RVR    Localized edema    Shortness of breath    Chronic mesenteric ischemia    Chronic anticoagulation    Other orders  -     traMADol (ULTRAM) 50 mg tablet; Take 1 tablet (50 mg total) by mouth every 6 (six) hours as needed for Pain.     At pt's request I have given pt one refill for her tramadol until she can see her gastroenterologist for her abdominal pain.  I have explained the potential for addiction and the need for further evaluation of her abdominal pain.   F/u this week with Dr Joana Hernandez who does mesenteric arterial ultrasound    Will review labs done by Dr Go.  Same  meds  Follow-up in about 6 months (around 7/16/2018).

## 2018-01-18 ENCOUNTER — HOSPITAL ENCOUNTER (OUTPATIENT)
Dept: VASCULAR SURGERY | Facility: CLINIC | Age: 73
Discharge: HOME | End: 2018-01-18
Attending: NURSE PRACTITIONER
Payer: MEDICARE

## 2018-01-18 ENCOUNTER — OFFICE VISIT (OUTPATIENT)
Dept: VASCULAR SURGERY | Facility: CLINIC | Age: 73
End: 2018-01-18
Payer: MEDICARE

## 2018-01-18 VITALS
WEIGHT: 213 LBS | BODY MASS INDEX: 37.74 KG/M2 | HEIGHT: 63 IN | SYSTOLIC BLOOD PRESSURE: 176 MMHG | HEART RATE: 68 BPM | TEMPERATURE: 98 F | DIASTOLIC BLOOD PRESSURE: 88 MMHG

## 2018-01-18 DIAGNOSIS — K55.1 CHRONIC MESENTERIC ISCHEMIA: Primary | ICD-10-CM

## 2018-01-18 DIAGNOSIS — K55.1 MESENTERIC ARTERY INSUFFICIENCY: ICD-10-CM

## 2018-01-18 DIAGNOSIS — I48.91 ATRIAL FIBRILLATION WITH RVR: ICD-10-CM

## 2018-01-18 DIAGNOSIS — K55.1 CHRONIC MESENTERIC ISCHEMIA: ICD-10-CM

## 2018-01-18 DIAGNOSIS — Z79.01 WARFARIN ANTICOAGULATION: ICD-10-CM

## 2018-01-18 PROCEDURE — 93975 VASCULAR STUDY: CPT | Mod: S$GLB,,, | Performed by: SURGERY

## 2018-01-18 PROCEDURE — 99999 PR PBB SHADOW E&M-EST. PATIENT-LVL III: CPT | Mod: PBBFAC,,, | Performed by: SURGERY

## 2018-01-18 PROCEDURE — 99213 OFFICE O/P EST LOW 20 MIN: CPT | Mod: S$GLB,,, | Performed by: SURGERY

## 2018-01-18 RX ORDER — CLOPIDOGREL BISULFATE 75 MG/1
75 TABLET ORAL DAILY
Qty: 90 TABLET | Refills: 4 | Status: SHIPPED | OUTPATIENT
Start: 2018-01-18 | End: 2019-01-14 | Stop reason: SDUPTHER

## 2018-01-18 RX ORDER — TRAMADOL HYDROCHLORIDE 50 MG/1
50 TABLET ORAL EVERY 6 HOURS PRN
Qty: 60 TABLET | Refills: 0 | Status: SHIPPED | OUTPATIENT
Start: 2018-01-18 | End: 2018-05-01 | Stop reason: SDUPTHER

## 2018-01-18 NOTE — PROGRESS NOTES
Patient ID: Adriana Strong is a 73 y.o. female.    I. HISTORY     Chief Complaint: Follow-up      HPI Adriana Strong is a 73 y.o. female chronic mesenteric ischemia and occluded proximal SMA with unsuccessful angioplasty 2015 followed by LIZ stenting via the left brachial artery 2015. Repeat intervention in 2016.    Since last procedure, patient had stable symptoms.  She has been seeing Dr. Kemp with recent EGD for workup of her abdominal pain with was within normal limits, celiac work up was negative. She has long history of constipation which is being treated with stool softeners, miralax, fiber supplement. On protonix.. No nausea or vomiting. Occasional abdominal pain following meals but can also go days pain free.      On anticoag for AF.    Incisional hernia repair last summer by Dr Milligan    Review of Systems   Constitution: Positive for weight loss. Negative for decreased appetite, fever, weakness and weight gain.        [facility]  7 pound loss in last 3 weeks   Eyes: Negative for blurred vision.   Cardiovascular: Negative for chest pain.   Respiratory: Negative for cough and shortness of breath.    Hematologic/Lymphatic:        Taking coumadin   Gastrointestinal: Positive for abdominal pain. Negative for change in bowel habit, dysphagia and nausea.        Upper abdomen pain x 2 days       II. PHYSICAL EXAM     Right Arm BP - Sittin/88 (18 0911)  Left Arm BP - Sitting: 160/91 (18 0911)  Pulse: 68  Temp: 97.8 °F (36.6 °C)  Body mass index is 37.73 kg/m².    Physical Exam   Constitutional: She is oriented to person, place, and time. She appears well-developed and well-nourished.   Cardiovascular: Intact distal pulses.    Pulses:       Radial pulses are 2+ on the right side, and 2+ on the left side.        Dorsalis pedis pulses are 2+ on the right side, and 2+ on the left side.   Pulmonary/Chest: Effort normal. No respiratory  distress.   Abdominal: Soft. She exhibits no distension. There is no tenderness. There is no rebound and no guarding.   Musculoskeletal: Normal range of motion. She exhibits edema.   BlEs +1   Neurological: She is alert and oriented to person, place, and time.   Skin: Skin is warm and dry.   Upper arms with bruising bilaterally   Psychiatric: She has a normal mood and affect.     Mesenteric duplex - No LIZ or Celiac stenosis, SMA occluded    III. ASSESSMENT & PLAN (MEDICAL DECISION MAKING)     1. Chronic mesenteric ischemia     2. Atrial fibrillation with RVR     3. Warfarin anticoagulation         Assessment/Diagnosis and Plan:  Adriana Strong is a 73 y.o. female with chronic mesenetric ischemia, occluded SMA and stenting of LIZ,.  Symptoms and weight are stable since last year. Inconsistent symtpoms with meals makes mesenteric ischemia less likely.    Follow up 1 year with mesenteric duplex    Joana Hernandez MD FACS [facility]  Vascular & Endovascular Surgery

## 2018-02-06 ENCOUNTER — TELEPHONE (OUTPATIENT)
Dept: GASTROENTEROLOGY | Facility: CLINIC | Age: 73
End: 2018-02-06

## 2018-02-06 NOTE — TELEPHONE ENCOUNTER
Spoke with patient daughter. Was able to get an earlier appointment with RONEY for stomach pain. Will keep follow up with  on May 1st.

## 2018-02-06 NOTE — TELEPHONE ENCOUNTER
----- Message from Bill Guzman sent at 2/5/2018  3:49 PM CST -----  Contact: Pt daughter:[phone]  Pt daughter called and states she would like wit the nurse in regards to getting the pt in to be seen sooner. Pt daughter states her mother the pt is in severe pain. Pt daughter would like a call back from the nurse.

## 2018-02-07 ENCOUNTER — OFFICE VISIT (OUTPATIENT)
Dept: GASTROENTEROLOGY | Facility: CLINIC | Age: 73
End: 2018-02-07
Payer: MEDICARE

## 2018-02-07 ENCOUNTER — TELEPHONE (OUTPATIENT)
Dept: ENDOSCOPY | Facility: HOSPITAL | Age: 73
End: 2018-02-07

## 2018-02-07 VITALS
DIASTOLIC BLOOD PRESSURE: 72 MMHG | WEIGHT: 212.94 LBS | HEART RATE: 76 BPM | BODY MASS INDEX: 36.35 KG/M2 | HEIGHT: 64 IN | SYSTOLIC BLOOD PRESSURE: 121 MMHG

## 2018-02-07 DIAGNOSIS — R10.33 PERIUMBILICAL ABDOMINAL PAIN: Primary | ICD-10-CM

## 2018-02-07 DIAGNOSIS — Z86.010 HISTORY OF COLON POLYPS: ICD-10-CM

## 2018-02-07 PROCEDURE — 99213 OFFICE O/P EST LOW 20 MIN: CPT | Performed by: NURSE PRACTITIONER

## 2018-02-07 PROCEDURE — 99214 OFFICE O/P EST MOD 30 MIN: CPT | Mod: S$GLB,,, | Performed by: NURSE PRACTITIONER

## 2018-02-07 PROCEDURE — 99999 PR PBB SHADOW E&M-EST. PATIENT-LVL III: CPT | Mod: PBBFAC,,, | Performed by: NURSE PRACTITIONER

## 2018-02-07 PROCEDURE — 1159F MED LIST DOCD IN RCRD: CPT | Mod: S$GLB,,, | Performed by: NURSE PRACTITIONER

## 2018-02-07 PROCEDURE — 3008F BODY MASS INDEX DOCD: CPT | Mod: S$GLB,,, | Performed by: NURSE PRACTITIONER

## 2018-02-07 PROCEDURE — 1126F AMNT PAIN NOTED NONE PRSNT: CPT | Mod: S$GLB,,, | Performed by: NURSE PRACTITIONER

## 2018-02-07 NOTE — TELEPHONE ENCOUNTER
Patient has an order for a colonoscopy.  Is it ok to hold Eliquis 2 days prior to procedure?  Please advise.  Thank you.  Serina

## 2018-02-07 NOTE — TELEPHONE ENCOUNTER
Patient has an order for a colonoscopy.  Is it ok to hold Plavix 5 days prior to procedure?  Please advise.  Thank you.  Serina

## 2018-02-07 NOTE — PROGRESS NOTES
[facility] Note    Reason for Visit:  The primary encounter diagnosis was Periumbilical abdominal pain. A diagnosis of History of colon polyps was also pertinent to this visit.    PCP:   Krzysztof Mcgraw       Referring MD:  No referring provider defined for this encounter.    HPI:  This is a 73 y.o. female here for f/u evaluation of chronic abdominal pain. Ms. Strong was last seen in clinic with Dr. Kemp in 4/17. She is new to me. Hx of being worked up with Dr. Phelps at .     Hx of recurrent periumbilical abdominal pain x 4 years. Radiates across entire abdomen and into chest. Occasionally will have nausea and small emesis episode. Occurring daily. Worse before BM, during BM, and after BM. Will occur after eating or an hour after eating. Tramadol daily and provides relief. Heating pad relief.     Stopped Metformin mild improvement reoccurred   Umbilical hernia repaired 6/12/17 relief x 4 weeks reoccurred   2014 Pt reports last Colonoscopy and hx of colon polyps.   2/22/16 EGD- normal  5/2017 Abd US- hepatic steatosis  5/2017 Ischemia US- normal   5/2017 Abd CT- No definite CT findings are seen to explain the source of patient's abdominal pain    Hx of constipation takes Miralax daily to BID. Having normal stool. Denies blood in stool and melena.     Occasional dyspepsia and indigestion. Occasionally will choke on solids and liquids for years. Occurs with pineapple and meats. SOB upon exertion, no SOB at rest. Denies painful swallowing. EGD 2016 no stricture. Taking Protonix 40 mg daily.     ROS:  Constitutional: No fevers, no chills, No unintentional weight loss, no fatigue   ENT: No allergies  CV: No chest pain, no palpitations, no perif. edema  Pulm: No cough, No shortness of breath, no wheezes, no sputum  Ophtho: No vision changes  GI: see HPI  Derm: No rash  Heme: No lymphadenopathy, No bruising  MSK: No arthritis, no muscle pain, no muscle weakness  : No dysuria, No  hematuria  Endo: No hot or cold intolerance  Neuro: No syncope, No seizure     Medical History:  has a past medical history of Anticoagulant long-term use; Anxiety; Arthritis; Asthma; Atrial fibrillation; Cataracts, bilateral; Chest pain, atypical; Colon polyp; Coronary artery disease; Diabetes mellitus; Dry eyes; Esophageal erosions; GERD (gastroesophageal reflux disease); Heart murmur; Hemorrhoid; High cholesterol; Hypertension; Irritable bowel syndrome; Shingles (2015); TIA (transient ischemic attack); and Use of cane as ambulatory aid.    Surgical History:  has a past surgical history that includes Total knee arthroplasty (Bilateral); nose polyp; Breast surgery; Tonsillectomy; left nasal polyp; Hysterectomy (partial); Total abdominal hysterectomy; left neck lymph node; right hip fatty mass tissue; Upper gastrointestinal endoscopy (2014); Colonoscopy (2014); Abdominal surgery; stent to small intestine; angiocele; Superior vena cava angioplasty / stenting; and Hernia repair.    Family History: family history is not on file..     Social History:  reports that she has never smoked. She has never used smokeless tobacco. She reports that she does not drink alcohol or use drugs.    Review of patient's allergies indicates:   Allergen Reactions    Celebrex [celecoxib] Shortness Of Breath    Dicyclomine Hives    Adhesive Dermatitis    Avelox [moxifloxacin] Swelling    Cilostazol Other (See Comments)     Elevates blood pressure    Eryc [erythromycin] Other (See Comments)     unknown    Iodine and iodide containing products Hives    Keflex [cephalexin] Hives    Meclomen      rashes    Meloxicam      Ear ringing    Metoclopramide Other (See Comments)     High blood pressure    Sulfa (sulfonamide antibiotics) Itching       Current Outpatient Prescriptions   Medication Sig    acetaminophen (TYLENOL ARTHRITIS) 650 MG TbSR Take 1,300 mg by mouth 2 (two) times daily.    albuterol (ACCUNEB) 1.25 mg/3 mL Nebu Take  scheduled q4 for the next two days while at home then resume prn dosing    albuterol (VENTOLIN HFA) 90 mcg/actuation inhaler Inhale 2 puffs into the lungs every 6 (six) hours as needed for Wheezing. Rescue    apixaban 5 mg Tab Take 1 tablet (5 mg total) by mouth 2 (two) times daily.    ascorbic acid (VITAMIN C) 500 MG tablet Take 500 mg by mouth once daily.    azelastine (ASTELIN) 137 mcg nasal spray 1 spray by Nasal route 2 (two) times daily.    budesonide-formoterol 160-4.5 mcg (SYMBICORT) 160-4.5 mcg/actuation HFAA Inhale 2 puffs into the lungs every 12 (twelve) hours. Controller    calcium carbonate (OS-MICHAEL) 600 mg (1,500 mg) Tab Take 1,200 mg by mouth once daily.    clopidogrel (PLAVIX) 75 mg tablet Take 1 tablet (75 mg total) by mouth once daily.    cyanocobalamin (VITAMIN B-12) 1000 MCG tablet Take 100 mcg by mouth once daily.    digoxin (LANOXIN) 125 mcg tablet Take 1 tablet (125 mcg total) by mouth once daily.    diltiaZEM (CARDIZEM SR) 60 MG Cp12 Take 3 capsules (180 mg total) by mouth 2 (two) times daily.    docusate sodium (COLACE) 100 MG capsule Take 1 capsule (100 mg total) by mouth 2 (two) times daily.    fexofenadine (ALLEGRA) 60 MG tablet Take 60 mg by mouth every morning.    fish oil-omega-3 fatty acids 300-1,000 mg capsule Take 1 g by mouth once daily.     fluticasone (FLONASE) 50 mcg/actuation nasal spray 1 spray by Each Nare route every morning.     furosemide (LASIX) 40 MG tablet Take 1 tablet (40 mg total) by mouth once daily.    lisinopril (PRINIVIL,ZESTRIL) 20 MG tablet Take 1 tablet (20 mg total) by mouth once daily.    lorazepam (ATIVAN) 0.5 MG tablet Take 0.5 mg by mouth every morning.     magnesium 250 mg Tab Take 250 mg by mouth. 3 in am and 3 pm    montelukast (SINGULAIR) 10 mg tablet Take 10 mg by mouth every evening.    oxybutynin (DITROPAN-XL) 5 MG TR24 Take 5 mg by mouth every evening.     pantoprazole (PROTONIX) 40 MG tablet Take 1 tablet (40 mg total) by  "mouth once daily. (Patient taking differently: Take 40 mg by mouth every morning. )    polyethylene glycol (GLYCOLAX) 17 gram PwPk Take 17 g by mouth once daily.    polyethylene glycol (GOLYTELY,NULYTELY) 236-22.74-6.74 -5.86 gram suspension Take 4,000 mLs (4 L total) by mouth once.    POLYSORBATE 80/GLYCERIN (REFRESH DRY EYE THERAPY OPHT) Apply to eye 2 (two) times daily.    potassium gluconate 550 mg (90 mg) Tab Take by mouth. 2 in am and 2 pm    psyllium (METAMUCIL) powder Take 1 packet by mouth 3 (three) times daily.    traMADol (ULTRAM) 50 mg tablet Take 1 tablet (50 mg total) by mouth every 6 (six) hours as needed for Pain.     No current facility-administered medications for this visit.      Objective Findings:    Vital Signs:  /72   Pulse 76   Ht 5' 4" (1.626 m)   Wt 96.6 kg (212 lb 15.4 oz)   LMP 01/21/1973 (Approximate)   BMI 36.56 kg/m²   Body mass index is 36.56 kg/m².    Physical Exam:  General Appearance: Well appearing in no acute distress  Head: Normocephalic, without obvious abnormality  Eyes: No scleral icterus, EOMI  ENT: Neck supple, Lips, mucosa, and tongue normal; teeth and gums normal  Lungs: CTA bilaterally in anterior and posterior fields, no wheezes, no crackles.  Heart: Regular rate and rhythm, no murmurs heard  Abdomen: Soft, generalized tenderness upon palpation, non distended with positive bowel sounds in all four quadrants.  Extremities: No clubbing, cyanosis or edema  Skin: No rash to exposed areas  Neurologic: AAOx4    Labs:  Lab Results   Component Value Date    WBC 9.1 11/20/2017    HGB 13.5 11/20/2017    HCT 41.6 11/20/2017     11/20/2017    CHOL 220 (H) 09/28/2016    TRIG 104 09/28/2016    HDL 75 09/28/2016    ALT 16 11/20/2017    AST 19 11/20/2017     11/20/2017    K 4.7 11/20/2017     11/20/2017    CREATININE 0.71 11/20/2017    BUN 15 11/20/2017    CO2 31 11/20/2017    TSH 0.90 01/17/2017    INR 1.1 11/20/2017    HGBA1C 6.7 (H) 05/28/2017 "     2014 Pt reports last Colonoscopy and hx of colon polyps.   2/22/16 EGD- normal  5/2017 Abd US- hepatic steatosis  5/2017 Ischemia US- normal   5/2017 Abd CT- No definite CT findings are seen to explain the source of patient's abdominal pain    Assessment:  1. Periumbilical abdominal pain    2. History of colon polyps      Recommendations:  1. & 2. Reviewed case with Dr. Kemp schedule HIDA scan to rule out biliary etiology. Schedule colonoscopy due to hx of colon polyps.     Follow up with Dr. Kemp after testing.     Order summary:  Orders Placed This Encounter    NM Hepatobiliary (HIDA) W Pharm and Ejection Fraction    Case request GI: COLONOSCOPY     Thank you so much for allowing me to participate in the care of Adriana Tae Moralez, APRN, FNP-C

## 2018-02-08 ENCOUNTER — TELEPHONE (OUTPATIENT)
Dept: ENDOSCOPY | Facility: HOSPITAL | Age: 73
End: 2018-02-08

## 2018-02-08 DIAGNOSIS — Z12.11 SPECIAL SCREENING FOR MALIGNANT NEOPLASMS, COLON: Primary | ICD-10-CM

## 2018-02-08 RX ORDER — POLYETHYLENE GLYCOL 3350, SODIUM SULFATE ANHYDROUS, SODIUM BICARBONATE, SODIUM CHLORIDE, POTASSIUM CHLORIDE 236; 22.74; 6.74; 5.86; 2.97 G/4L; G/4L; G/4L; G/4L; G/4L
4 POWDER, FOR SOLUTION ORAL ONCE
Qty: 4000 ML | Refills: 0 | Status: SHIPPED | OUTPATIENT
Start: 2018-02-08 | End: 2018-02-08

## 2018-02-08 NOTE — TELEPHONE ENCOUNTER
Please see message below regarding the hold of patient's Eliquis and advise on how to proceed with scheduling procedure.  Thank you.  Serina

## 2018-02-08 NOTE — TELEPHONE ENCOUNTER
Contacted patient to schedule colonoscopy and inform of receiving approval from Dr Hernandez to hold Plavix and Dr Kemp/Dr Carrington to hold Eliquis.   Scheduled for 3/14/18 at 9:00 am with Dr. Kemp.  Went over prep instructions and informed to hold Plavix 5 days prior and Eliquis 2 days prior to procedure. Verbally stated understanding.  Instructions mailed to address in chart.  No further questions at this time.

## 2018-02-26 ENCOUNTER — HOSPITAL ENCOUNTER (OUTPATIENT)
Dept: RADIOLOGY | Facility: HOSPITAL | Age: 73
Discharge: HOME | End: 2018-02-26
Attending: NURSE PRACTITIONER
Payer: MEDICARE

## 2018-02-26 DIAGNOSIS — R10.33 PERIUMBILICAL ABDOMINAL PAIN: ICD-10-CM

## 2018-02-26 PROCEDURE — A9537 TC99M MEBROFENIN: HCPCS

## 2018-02-26 PROCEDURE — 78227 HEPATOBIL SYST IMAGE W/DRUG: CPT | Mod: 26,,, | Performed by: RADIOLOGY

## 2018-03-08 ENCOUNTER — TELEPHONE (OUTPATIENT)
Dept: GASTROENTEROLOGY | Facility: CLINIC | Age: 73
End: 2018-03-08

## 2018-03-08 NOTE — TELEPHONE ENCOUNTER
Spoke with patient and let her know the HIDA scan is normal.    Patient is scheduled to have colonoscopy done on 3/14.

## 2018-03-14 ENCOUNTER — SURGERY (OUTPATIENT)
Age: 73
End: 2018-03-14

## 2018-03-14 ENCOUNTER — ANESTHESIA EVENT (OUTPATIENT)
Dept: ENDOSCOPY | Facility: HOSPITAL | Age: 73
End: 2018-03-14
Payer: MEDICARE

## 2018-03-14 ENCOUNTER — ANESTHESIA (OUTPATIENT)
Dept: ENDOSCOPY | Facility: HOSPITAL | Age: 73
End: 2018-03-14
Payer: MEDICARE

## 2018-03-14 ENCOUNTER — HOSPITAL ENCOUNTER (OUTPATIENT)
Facility: HOSPITAL | Age: 73
Discharge: HOME | End: 2018-03-14
Attending: INTERNAL MEDICINE | Admitting: INTERNAL MEDICINE
Payer: MEDICARE

## 2018-03-14 VITALS
HEIGHT: 64 IN | TEMPERATURE: 98 F | WEIGHT: 213 LBS | HEART RATE: 89 BPM | SYSTOLIC BLOOD PRESSURE: 163 MMHG | OXYGEN SATURATION: 97 % | BODY MASS INDEX: 36.37 KG/M2 | DIASTOLIC BLOOD PRESSURE: 74 MMHG | RESPIRATION RATE: 20 BRPM

## 2018-03-14 DIAGNOSIS — R10.10 PAIN OF UPPER ABDOMEN: Primary | ICD-10-CM

## 2018-03-14 DIAGNOSIS — R10.9 ABDOMINAL PAIN: ICD-10-CM

## 2018-03-14 LAB — POCT GLUCOSE: 133 MG/DL (ref 70–110)

## 2018-03-14 PROCEDURE — 45380 COLONOSCOPY AND BIOPSY: CPT | Mod: 59,,, | Performed by: INTERNAL MEDICINE

## 2018-03-14 PROCEDURE — 27201089 HC SNARE, DISP (ANY): Performed by: INTERNAL MEDICINE

## 2018-03-14 PROCEDURE — D9220A PRA ANESTHESIA: Mod: PT,ANES,, | Performed by: ANESTHESIOLOGY

## 2018-03-14 PROCEDURE — 63600175 PHARM REV CODE 636 W HCPCS: Performed by: NURSE ANESTHETIST, CERTIFIED REGISTERED

## 2018-03-14 PROCEDURE — 37000008 HC ANESTHESIA 1ST 15 MINUTES: Performed by: INTERNAL MEDICINE

## 2018-03-14 PROCEDURE — 37000009 HC ANESTHESIA EA ADD 15 MINS: Performed by: INTERNAL MEDICINE

## 2018-03-14 PROCEDURE — 45385 COLONOSCOPY W/LESION REMOVAL: CPT | Performed by: INTERNAL MEDICINE

## 2018-03-14 PROCEDURE — 25000003 PHARM REV CODE 250: Performed by: INTERNAL MEDICINE

## 2018-03-14 PROCEDURE — 88305 TISSUE EXAM BY PATHOLOGIST: CPT | Performed by: PATHOLOGY

## 2018-03-14 PROCEDURE — 82962 GLUCOSE BLOOD TEST: CPT | Performed by: INTERNAL MEDICINE

## 2018-03-14 PROCEDURE — 45385 COLONOSCOPY W/LESION REMOVAL: CPT | Mod: PT,,, | Performed by: INTERNAL MEDICINE

## 2018-03-14 PROCEDURE — D9220A PRA ANESTHESIA: Mod: PT,CRNA,, | Performed by: NURSE ANESTHETIST, CERTIFIED REGISTERED

## 2018-03-14 PROCEDURE — 45380 COLONOSCOPY AND BIOPSY: CPT | Performed by: INTERNAL MEDICINE

## 2018-03-14 PROCEDURE — 88305 TISSUE EXAM BY PATHOLOGIST: CPT | Mod: 26,,, | Performed by: PATHOLOGY

## 2018-03-14 PROCEDURE — 27201012 HC FORCEPS, HOT/COLD, DISP: Performed by: INTERNAL MEDICINE

## 2018-03-14 RX ORDER — PROPOFOL 10 MG/ML
VIAL (ML) INTRAVENOUS
Status: DISCONTINUED | OUTPATIENT
Start: 2018-03-14 | End: 2018-03-14

## 2018-03-14 RX ORDER — LIDOCAINE HCL/PF 100 MG/5ML
SYRINGE (ML) INTRAVENOUS
Status: DISCONTINUED | OUTPATIENT
Start: 2018-03-14 | End: 2018-03-14

## 2018-03-14 RX ORDER — SODIUM CHLORIDE 9 MG/ML
INJECTION, SOLUTION INTRAVENOUS CONTINUOUS
Status: DISCONTINUED | OUTPATIENT
Start: 2018-03-14 | End: 2018-03-14 | Stop reason: HOSPADM

## 2018-03-14 RX ADMIN — PROPOFOL 20 MG: 10 INJECTION, EMULSION INTRAVENOUS at 09:03

## 2018-03-14 RX ADMIN — SODIUM CHLORIDE: 0.9 INJECTION, SOLUTION INTRAVENOUS at 08:03

## 2018-03-14 RX ADMIN — PROPOFOL 60 MG: 10 INJECTION, EMULSION INTRAVENOUS at 09:03

## 2018-03-14 RX ADMIN — LIDOCAINE HYDROCHLORIDE 50 MG: 20 INJECTION, SOLUTION INTRAVENOUS at 09:03

## 2018-03-14 NOTE — TRANSFER OF CARE
"Anesthesia Transfer of Care Note    Patient: Adriana Strong    Procedure(s) Performed: Procedure(s) (LRB):  COLONOSCOPY (N/A)    Patient location: PACU    Anesthesia Type: general    Transport from OR: Transported from OR on room air with adequate spontaneous ventilation    Post pain: adequate analgesia    Post assessment: no apparent anesthetic complications and tolerated procedure well    Post vital signs: stable    Level of consciousness: awake, oriented and alert    Nausea/Vomiting: no nausea/vomiting    Complications: none    Transfer of care protocol was followed      Last vitals:   Visit Vitals  BP (!) 171/81 (BP Location: Left arm, Patient Position: Lying)   Pulse 81   Temp 36.6 °C (97.9 °F)   Resp 16   Ht 5' 4" (1.626 m)   Wt 96.6 kg (213 lb)   LMP 01/21/1973 (Approximate)   SpO2 97%   Breastfeeding? No   BMI 36.56 kg/m²     "

## 2018-03-14 NOTE — DISCHARGE INSTRUCTIONS
Colonoscopy     A camera attached to a flexible tube with a viewing lens is used to take video pictures.     Colonoscopy is a test to view the inside of your lower digestive tract (colon and rectum). Sometimes it can show the last part of the small intestine (ileum). During the test, small pieces of tissue may be removed for testing. This is called a biopsy. Small growths, such as polyps, may also be removed.   Why is colonoscopy done?  The test is done to help look for colon cancer. And it can help find the source of abdominal pain, bleeding, and changes in bowel habits. It may be needed once a year, depending on factors such as your:  · Age  · Health history  · Family health history  · Symptoms  · Results from any prior colonoscopy  Risks and possible complications  These include:  · Bleeding               · A puncture or tear in the colon   · Risks of anesthesia  · A cancer lesion not being seen  Getting ready   To prepare for the test:  · Talk with your healthcare provider about the risks of the test (see below). Also ask your healthcare provider about alternatives to the test.  · Tell your healthcare provider about any medicines you take. Also tell him or her about any health conditions you may have.  · Make sure your rectum and colon are empty for the test. Follow the diet and bowel prep instructions exactly. If you dont, the test may need to be rescheduled.  · Plan for a friend or family member to drive you home after the test.     Colonoscopy provides an inside view of the entire colon.     You may discuss the results with your doctor right away or at a future visit.  During the test   The test is usually done in the hospital on an outpatient basis. This means you go home the same day. The procedure takes about 30 minutes. During that time:  · You are given relaxing (sedating) medicine through an IV line. You may be drowsy, or fully asleep.  · The healthcare provider will first give you a physical exam to  check for anal and rectal problems.  · Then the anus is lubricated and the scope inserted.  · If you are awake, you may have a feeling similar to needing to have a bowel movement. You may also feel pressure as air is pumped into the colon. Its OK to pass gas during the procedure.  · Biopsy, polyp removal, or other treatments may be done during the test.  After the test   You may have gas right after the test. It can help to try to pass it to help prevent later bloating. Your healthcare provider may discuss the results with you right away. Or you may need to schedule a follow-up visit to talk about the results. After the test, you can go back to your normal eating and other activities. You may be tired from the sedation and need to rest for a few hours.  Date Last Reviewed: 11/1/2016 © 2000-2017 [employer]. [address] [place] [address], [place] [address]. All rights reserved. This information is not intended as a substitute for professional medical care. Always follow your healthcare professional's instructions.

## 2018-03-14 NOTE — ANESTHESIA POSTPROCEDURE EVALUATION
"Anesthesia Post Evaluation    Patient: Adriana Strong    Procedure(s) Performed: Procedure(s) (LRB):  COLONOSCOPY (N/A)    Final Anesthesia Type: general  Patient location during evaluation: GI PACU  Patient participation: Yes- Able to Participate  Level of consciousness: awake and alert  Post-procedure vital signs: reviewed and stable  Pain management: adequate  Airway patency: patent  PONV status at discharge: No PONV  Anesthetic complications: no      Cardiovascular status: blood pressure returned to baseline  Respiratory status: unassisted  Hydration status: euvolemic  Follow-up not needed.        Visit Vitals  BP (!) 163/74   Pulse 89   Temp 36.6 °C (97.9 °F)   Resp 20   Ht 5' 4" (1.626 m)   Wt 96.6 kg (213 lb)   LMP 01/21/1973 (Approximate)   SpO2 97%   Breastfeeding? No   BMI 36.56 kg/m²       Pain/Naresh Score: Pain Assessment Performed: Yes (3/14/2018  8:07 AM)  Presence of Pain: denies (3/14/2018  9:34 AM)  Naresh Score: 10 (3/14/2018 10:06 AM)      "

## 2018-03-14 NOTE — PROVATION PATIENT INSTRUCTIONS
Discharge Summary/Instructions after an Endoscopic Procedure  Patient Name: Adriana Strong  Patient MRN: [id number]  Patient YOB: 1945 Wednesday, March 14, 2018  Efren Kemp MD  RESTRICTIONS:  During your procedure today, you received medications for sedation.  These   medications may affect your judgment, balance and coordination.  Therefore,   for 24 hours, you have the following restrictions:   - DO NOT drive a car, operate machinery, make legal/financial decisions,   sign important papers or drink alcohol.    ACTIVITY:  The following day: return to full activity including work, except no heavy   lifting, straining or running for 3 days if polyps were removed.  DIET:  Eat and drink normally unless instructed otherwise.     TREATMENT FOR COMMON SIDE EFFECTS:  - Mild abdominal pain, nausea, belching, bloating or excessive gas:  rest,   eat lightly and use a heating pad.  - Sore Throat: treat with throat lozenges and/or gargle with warm salt   water.  - Because air was used during the procedure, expelling large amounts of air   from your rectum or belching is normal.  - If a bowel prep was taken, you may not have a bowel movement for 1-3 days.    This is normal.  SYMPTOMS TO WATCH FOR AND REPORT TO YOUR PHYSICIAN:  1. Abdominal pain or bloating, other than gas cramps.  2. Chest pain.  3. Back pain.  4. Signs of infection such as: chills or fever occurring within 24 hours   after the procedure.  5. Rectal bleeding, which would show as bright red, maroon, or black stools.   (A tablespoon of blood from the rectum is not serious, especially if   hemorrhoids are present.)  6. Vomiting.  7. Weakness or dizziness.  GO DIRECTLY TO THE NEAREST EMERGENCY ROOM IF YOU HAVE ANY OF THE FOLLOWING:      Difficulty breathing  Chills and/or fever over 101 F   Persistent vomiting and/or vomiting blood   Severe abdominal pain   Severe chest pain   Black, tarry stools   Bleeding- more than one tablespoon   Any other symptom or  condition that you feel may need urgent attention  Your doctor recommends these additional instructions:  If any biopsies were taken, your doctors clinic will contact you in 1 to 2   weeks with any results.  You have a contact number available for emergencies.  The signs and symptoms   of potential delayed complications were discussed with you.  You may return   to normal activities tomorrow.  Written discharge instructions were   provided to you.   You are being discharged to home.   We are waiting for your pathology results.   Your physician has indicated that a repeat colonoscopy is not recommended   for surveillance.   Resume taking Eliquis (apixaban) in 2 days and Plavix (clopidogrel) in 2   days at your prior doses.   The findings and recommendations were discussed with your designated   responsible adult.  For questions, problems or results please call your physician - Efren Kemp MD at Work:  [phone].  [facility], EMERGENCY ROOM PHONE NUMBER: [phone]  IF A COMPLICATION OR EMERGENCY SITUATION ARISES AND YOU ARE UNABLE TO REACH   YOUR PHYSICIAN - GO DIRECTLY TO THE EMERGENCY ROOM.  Efren Kemp MD  3/14/2018 9:31:19 AM  This report has been verified and signed electronically.

## 2018-03-14 NOTE — ANESTHESIA PREPROCEDURE EVALUATION
03/14/2018  Adriana Strong is a 73 y.o., female.    Anesthesia Evaluation         Review of Systems  Anesthesia Hx:  No problems with previous Anesthesia   Social:  Non-Smoker, No Alcohol Use    Hematology/Oncology:        Hematology Comments: Anticoagulant long-term use   Cardiovascular:   Hypertension CAD   hyperlipidemia    Pulmonary:   COPD Asthma    Hepatic/GI:   GERD Irritable bowel syndrome  Esophageal erosions   Neurological:   TIA,    Endocrine:   Diabetes, type 2    Psych:   anxiety          Physical Exam  General:  Well nourished    Airway/Jaw/Neck:  Airway Findings: Mouth Opening: Normal Tongue: Normal  General Airway Assessment: Adult  Jaw/Neck Findings:     Neck ROM: Normal ROM      Dental:  Dental Findings: In tact   Chest/Lungs:  Chest/Lungs Findings: left base coarse breath sounds.        Mental Status:  Mental Status Findings:  Cooperative         Anesthesia Plan  Type of Anesthesia, risks & benefits discussed:  Anesthesia Type:  general  Patient's Preference:   Intra-op Monitoring Plan: standard ASA monitors  Intra-op Monitoring Plan Comments:   Post Op Pain Control Plan:   Post Op Pain Control Plan Comments:   Induction:   IV  Beta Blocker:  Patient is not currently on a Beta-Blocker (No further documentation required).       Informed Consent: Patient understands risks and agrees with Anesthesia plan.  Questions answered. Anesthesia consent signed with patient.  ASA Score: 3     Day of Surgery Review of History & Physical:    H&P update referred to the provider.         Ready For Surgery From Anesthesia Perspective.

## 2018-03-14 NOTE — H&P
Short Stay Endoscopy History and Physical    PCP - Krzysztof Mcgraw MD    Procedure - Colonoscopy  ASA - per anesthesia  Mallampati - per anesthesia  History of Anesthesia problems - no  Family history Anesthesia problems - no   Plan of anesthesia - General    HPI:  This is a 73 y.o. female here for evaluation of : personal history of colon polyps, abdominal pains      ROS:  Constitutional: No fevers, chills, No weight loss  CV: No chest pain  Pulm: No cough, No shortness of breath  GI: see HPI  Derm: No rash    Medical History:  has a past medical history of Anticoagulant long-term use; Anxiety; Arthritis; Asthma; Atrial fibrillation; Cataracts, bilateral; Chest pain, atypical; Colon polyp; Coronary artery disease; Diabetes mellitus; Dry eyes; Esophageal erosions; GERD (gastroesophageal reflux disease); Heart murmur; Hemorrhoid; High cholesterol; Hypertension; Irritable bowel syndrome; Shingles (2015); TIA (transient ischemic attack); and Use of cane as ambulatory aid.    Surgical History:  has a past surgical history that includes Total knee arthroplasty (Bilateral); nose polyp; Breast surgery; Tonsillectomy; left nasal polyp; Hysterectomy (partial); Total abdominal hysterectomy; left neck lymph node; right hip fatty mass tissue; Upper gastrointestinal endoscopy (2014); Colonoscopy (2014); Abdominal surgery; stent to small intestine; angiocele; Superior vena cava angioplasty / stenting; and Hernia repair.    Family History: family history is not on file.. Otherwise no colon cancer, inflammatory bowel disease, or GI malignancies.    Social History:  reports that she has never smoked. She has never used smokeless tobacco. She reports that she does not drink alcohol or use drugs.    Review of patient's allergies indicates:   Allergen Reactions    Celebrex [celecoxib] Shortness Of Breath    Ciprofloxacin Swelling     lip    Dicyclomine Hives    Adhesive Dermatitis    Avelox [moxifloxacin] Swelling     Cilostazol Other (See Comments)     Elevates blood pressure    Eryc [erythromycin] Other (See Comments)     unknown    Iodine and iodide containing products Hives    Keflex [cephalexin] Hives    Meclomen      rashes    Meloxicam      Ear ringing    Metoclopramide Other (See Comments)     High blood pressure    Sulfa (sulfonamide antibiotics) Itching       Medications:   Prescriptions Prior to Admission   Medication Sig Dispense Refill Last Dose    acetaminophen (TYLENOL ARTHRITIS) 650 MG TbSR Take 1,300 mg by mouth 2 (two) times daily.   Past Week at Unknown time    albuterol (ACCUNEB) 1.25 mg/3 mL Nebu Take scheduled q4 for the next two days while at home then resume prn dosing 3 mL 1 3/14/2018 at Unknown time    apixaban 5 mg Tab Take 1 tablet (5 mg total) by mouth 2 (two) times daily. 180 tablet 3 3/11/2018 at Unknown time    ascorbic acid (VITAMIN C) 500 MG tablet Take 500 mg by mouth once daily.   Past Week at Unknown time    budesonide-formoterol 160-4.5 mcg (SYMBICORT) 160-4.5 mcg/actuation HFAA Inhale 2 puffs into the lungs every 12 (twelve) hours. Controller   3/13/2018 at Unknown time    calcium carbonate (OS-MICHAEL) 600 mg (1,500 mg) Tab Take 1,200 mg by mouth once daily.   Past Week at Unknown time    clopidogrel (PLAVIX) 75 mg tablet Take 1 tablet (75 mg total) by mouth once daily. 90 tablet 4 3/8/2018 at Unknown time    cyanocobalamin (VITAMIN B-12) 1000 MCG tablet Take 100 mcg by mouth once daily.   Past Week at Unknown time    digoxin (LANOXIN) 125 mcg tablet Take 1 tablet (125 mcg total) by mouth once daily. 90 tablet 3 3/14/2018 at Unknown time    diltiaZEM (CARDIZEM SR) 60 MG Cp12 Take 3 capsules (180 mg total) by mouth 2 (two) times daily. 180 capsule 3 3/14/2018 at Unknown time    docusate sodium (COLACE) 100 MG capsule Take 1 capsule (100 mg total) by mouth 2 (two) times daily. 60 capsule 1 Past Week at Unknown time    fexofenadine (ALLEGRA) 60 MG tablet Take 60 mg by mouth  every morning.   Past Week at Unknown time    fish oil-omega-3 fatty acids 300-1,000 mg capsule Take 1 g by mouth once daily.    Past Week at Unknown time    furosemide (LASIX) 40 MG tablet Take 1 tablet (40 mg total) by mouth once daily. 90 tablet 3 Past Week at Unknown time    lisinopril (PRINIVIL,ZESTRIL) 20 MG tablet Take 1 tablet (20 mg total) by mouth once daily. 90 tablet 3 3/14/2018 at Unknown time    lorazepam (ATIVAN) 0.5 MG tablet Take 0.5 mg by mouth every morning.    3/13/2018 at Unknown time    magnesium 250 mg Tab Take 250 mg by mouth. 3 in am and 3 pm   Past Week at Unknown time    montelukast (SINGULAIR) 10 mg tablet Take 10 mg by mouth every evening.   Past Week at Unknown time    oxybutynin (DITROPAN-XL) 5 MG TR24 Take 5 mg by mouth every evening.    Past Week at Unknown time    pantoprazole (PROTONIX) 40 MG tablet Take 1 tablet (40 mg total) by mouth once daily. (Patient taking differently: Take 40 mg by mouth every morning. ) 90 tablet 3 Past Week at Unknown time    polyethylene glycol (GLYCOLAX) 17 gram PwPk Take 17 g by mouth once daily.   3/14/2018 at 0420    POLYSORBATE 80/GLYCERIN (REFRESH DRY EYE THERAPY OPHT) Apply to eye 2 (two) times daily.   Past Week at Unknown time    potassium gluconate 550 mg (90 mg) Tab Take by mouth. 2 in am and 2 pm   Past Week at Unknown time    psyllium (METAMUCIL) powder Take 1 packet by mouth 3 (three) times daily.   Past Month at Unknown time    traMADol (ULTRAM) 50 mg tablet Take 1 tablet (50 mg total) by mouth every 6 (six) hours as needed for Pain. 60 tablet 0 Past Week at Unknown time    albuterol (VENTOLIN HFA) 90 mcg/actuation inhaler Inhale 2 puffs into the lungs every 6 (six) hours as needed for Wheezing. Rescue   Taking    azelastine (ASTELIN) 137 mcg nasal spray 1 spray by Nasal route 2 (two) times daily.   Taking    fluticasone (FLONASE) 50 mcg/actuation nasal spray 1 spray by Each Nare route every morning.    Taking          Physical Exam:    Vital Signs:   Vitals:    03/14/18 0803   BP: (!) 171/81   Pulse: 81   Resp: 16   Temp: 97.9 °F (36.6 °C)       General Appearance: Well appearing in no acute distress  Eyes:    No scleral icterus  ENT: Neck supple, Lips, mucosa, and tongue normal; teeth and gums normal  Lungs: CTA bilaterally  Heart:  S1, S2 normal, no murmurs heard  Abdomen: Soft, non tender, non distended with positive bowel sounds. No hepatosplenomegaly, ascites, or mass.  Extremities: 2+ pulses, no clubbing, cyanosis or edema  Skin: No rash      Labs:  Lab Results   Component Value Date    WBC 9.1 11/20/2017    HGB 13.5 11/20/2017    HCT 41.6 11/20/2017     11/20/2017    CHOL 220 (H) 09/28/2016    TRIG 104 09/28/2016    HDL 75 09/28/2016    ALT 16 11/20/2017    AST 19 11/20/2017     11/20/2017    K 4.7 11/20/2017     11/20/2017    CREATININE 0.71 11/20/2017    BUN 15 11/20/2017    CO2 31 11/20/2017    TSH 0.90 01/17/2017    INR 1.1 11/20/2017    HGBA1C 6.7 (H) 05/28/2017       I have explained the risks and benefits of endoscopy procedures to the patient including but not limited to bleeding, perforation, infection, and death.      Efren Kemp MD

## 2018-03-21 ENCOUNTER — TELEPHONE (OUTPATIENT)
Dept: ENDOSCOPY | Facility: HOSPITAL | Age: 73
End: 2018-03-21

## 2018-04-25 RX ORDER — DILTIAZEM HYDROCHLORIDE 60 MG/1
180 CAPSULE, EXTENDED RELEASE ORAL 2 TIMES DAILY
Qty: 180 CAPSULE | Refills: 3 | Status: SHIPPED | OUTPATIENT
Start: 2018-04-25 | End: 2018-08-21 | Stop reason: SDUPTHER

## 2018-04-26 DIAGNOSIS — R06.02 SHORTNESS OF BREATH: Primary | ICD-10-CM

## 2018-04-30 ENCOUNTER — HOSPITAL ENCOUNTER (OUTPATIENT)
Dept: RADIOLOGY | Facility: HOSPITAL | Age: 73
Discharge: HOME | End: 2018-04-30
Attending: FAMILY MEDICINE
Payer: MEDICARE

## 2018-04-30 ENCOUNTER — HOSPITAL ENCOUNTER (OUTPATIENT)
Dept: CARDIOLOGY | Facility: HOSPITAL | Age: 73
Discharge: HOME | End: 2018-04-30
Attending: FAMILY MEDICINE
Payer: MEDICARE

## 2018-04-30 DIAGNOSIS — R06.02 SHORTNESS OF BREATH: ICD-10-CM

## 2018-04-30 PROCEDURE — 93306 TTE W/DOPPLER COMPLETE: CPT | Mod: 26,,, | Performed by: INTERNAL MEDICINE

## 2018-04-30 PROCEDURE — 93880 EXTRACRANIAL BILAT STUDY: CPT | Mod: TC

## 2018-04-30 PROCEDURE — 93306 TTE W/DOPPLER COMPLETE: CPT

## 2018-04-30 PROCEDURE — 93880 EXTRACRANIAL BILAT STUDY: CPT | Mod: 26,,, | Performed by: RADIOLOGY

## 2018-05-01 ENCOUNTER — OFFICE VISIT (OUTPATIENT)
Dept: GASTROENTEROLOGY | Facility: CLINIC | Age: 73
End: 2018-05-01
Payer: MEDICARE

## 2018-05-01 VITALS
WEIGHT: 212.5 LBS | HEIGHT: 64 IN | BODY MASS INDEX: 36.28 KG/M2 | DIASTOLIC BLOOD PRESSURE: 66 MMHG | SYSTOLIC BLOOD PRESSURE: 121 MMHG | HEART RATE: 75 BPM

## 2018-05-01 DIAGNOSIS — R10.13 ABDOMINAL PAIN, EPIGASTRIC: Primary | ICD-10-CM

## 2018-05-01 DIAGNOSIS — Z86.010 PERSONAL HISTORY OF COLONIC POLYPS: ICD-10-CM

## 2018-05-01 LAB
AORTIC VALVE STENOSIS: ABNORMAL
ESTIMATED PA SYSTOLIC PRESSURE: 31.3
MITRAL VALVE MOBILITY: NORMAL
RETIRED EF AND QEF - SEE NOTES: 65 (ref 55–65)
TRICUSPID VALVE REGURGITATION: ABNORMAL

## 2018-05-01 PROCEDURE — 3078F DIAST BP <80 MM HG: CPT | Mod: CPTII,S$GLB,, | Performed by: INTERNAL MEDICINE

## 2018-05-01 PROCEDURE — 99213 OFFICE O/P EST LOW 20 MIN: CPT | Mod: S$GLB,,, | Performed by: INTERNAL MEDICINE

## 2018-05-01 PROCEDURE — 99999 PR PBB SHADOW E&M-EST. PATIENT-LVL V: CPT | Mod: PBBFAC,,, | Performed by: INTERNAL MEDICINE

## 2018-05-01 PROCEDURE — 3074F SYST BP LT 130 MM HG: CPT | Mod: CPTII,S$GLB,, | Performed by: INTERNAL MEDICINE

## 2018-05-01 RX ORDER — TRAMADOL HYDROCHLORIDE 50 MG/1
50 TABLET ORAL EVERY 6 HOURS PRN
Qty: 90 TABLET | Refills: 3 | Status: SHIPPED | OUTPATIENT
Start: 2018-05-01 | End: 2019-01-24

## 2018-05-01 NOTE — PROGRESS NOTES
[facility] Note    Reason for Visit:  The primary encounter diagnosis was Abdominal pain, epigastric. A diagnosis of Personal history of colonic polyps was also pertinent to this visit.    PCP:   Krzysztof Mcgraw       Referring MD:  No referring provider defined for this encounter.    HPI:  This is a 73 y.o. female here for f/u evaluation of chronic abdominal pain. Ms. Strong was last seen in clinic with Dr. Kemp in 4/17. She is new to me. Hx of being worked up with Dr. Phelps at .     Hx of recurrent periumbilical abdominal pain x 4 years. Radiates across entire abdomen and into chest. Occasionally will have nausea and small emesis episode. Occurring daily. Worse before BM, during BM, and after BM. Will occur after eating or an hour after eating. Tramadol daily and provides relief. Heating pad relief.     Stopped Metformin mild improvement reoccurred   Umbilical hernia repaired 6/12/17 relief x 4 weeks reoccurred   2014 Pt reports last Colonoscopy and hx of colon polyps.   2/22/16 EGD- normal  5/2017 Abd US- hepatic steatosis  5/2017 Ischemia US- normal   5/2017 Abd CT- No definite CT findings are seen to explain the source of patient's abdominal pain    Hx of constipation takes Miralax daily to BID. Having normal stool. Denies blood in stool and melena.     Occasional dyspepsia and indigestion. Occasionally will choke on solids and liquids for years. Occurs with pineapple and meats. SOB upon exertion, no SOB at rest. Denies painful swallowing. EGD 2016 no stricture. Taking Protonix 40 mg daily.     Interval History:  Has had HIDA scan and colonoscopy in interim  Had hernia surgery last June and did well for a few weeks and then the pain returned  Still having pains every day or so, but not related to food or eating  Bm's are ok, not constipated    ROS:  Constitutional: No fevers, no chills, No unintentional weight loss, no fatigue   ENT: No allergies  CV: No chest pain, no palpitations,  no perif. edema  Pulm: No cough, No shortness of breath, no wheezes, no sputum  Ophtho: No vision changes  GI: see HPI  Derm: No rash  Heme: No lymphadenopathy, No bruising  MSK: No arthritis, no muscle pain, no muscle weakness  : No dysuria, No hematuria  Endo: No hot or cold intolerance  Neuro: No syncope, No seizure     Medical History:  has a past medical history of Anticoagulant long-term use; Anxiety; Arthritis; Asthma; Atrial fibrillation; Cataracts, bilateral; Chest pain, atypical; Colon polyp; Coronary artery disease; Diabetes mellitus; Dry eyes; Esophageal erosions; GERD (gastroesophageal reflux disease); Heart murmur; Hemorrhoid; High cholesterol; Hypertension; Irritable bowel syndrome; Shingles (2015); TIA (transient ischemic attack); and Use of cane as ambulatory aid.    Surgical History:  has a past surgical history that includes Total knee arthroplasty (Bilateral); nose polyp; Breast surgery; Tonsillectomy; left nasal polyp; Hysterectomy (partial); Total abdominal hysterectomy; left neck lymph node; right hip fatty mass tissue; Upper gastrointestinal endoscopy (2014); Colonoscopy (2014); Abdominal surgery; stent to small intestine; angiocele; Superior vena cava angioplasty / stenting; Hernia repair; and Colonoscopy (N/A, 3/14/2018).        Review of patient's allergies indicates:   Allergen Reactions    Celebrex [celecoxib] Shortness Of Breath    Dicyclomine Hives    Adhesive Dermatitis    Avelox [moxifloxacin] Swelling    Cilostazol Other (See Comments)     Elevates blood pressure    Eryc [erythromycin] Other (See Comments)     unknown    Iodine and iodide containing products Hives    Keflex [cephalexin] Hives    Meclomen      rashes    Meloxicam      Ear ringing    Metoclopramide Other (See Comments)     High blood pressure    Sulfa (sulfonamide antibiotics) Itching       Current Outpatient Prescriptions   Medication Sig    acetaminophen (TYLENOL ARTHRITIS) 650 MG TbSR Take 1,300 mg  by mouth 2 (two) times daily.    albuterol (ACCUNEB) 1.25 mg/3 mL Nebu Take scheduled q4 for the next two days while at home then resume prn dosing    albuterol (VENTOLIN HFA) 90 mcg/actuation inhaler Inhale 2 puffs into the lungs every 6 (six) hours as needed for Wheezing. Rescue    apixaban 5 mg Tab Take 1 tablet (5 mg total) by mouth 2 (two) times daily.    ascorbic acid (VITAMIN C) 500 MG tablet Take 500 mg by mouth once daily.    azelastine (ASTELIN) 137 mcg nasal spray 1 spray by Nasal route 2 (two) times daily.    budesonide-formoterol 160-4.5 mcg (SYMBICORT) 160-4.5 mcg/actuation HFAA Inhale 2 puffs into the lungs every 12 (twelve) hours. Controller    calcium carbonate (OS-MICHAEL) 600 mg (1,500 mg) Tab Take 1,200 mg by mouth once daily.    clopidogrel (PLAVIX) 75 mg tablet Take 1 tablet (75 mg total) by mouth once daily.    cyanocobalamin (VITAMIN B-12) 1000 MCG tablet Take 100 mcg by mouth once daily.    digoxin (LANOXIN) 125 mcg tablet Take 1 tablet (125 mcg total) by mouth once daily.    diltiaZEM (CARDIZEM SR) 60 MG Cp12 Take 3 capsules (180 mg total) by mouth 2 (two) times daily.    docusate sodium (COLACE) 100 MG capsule Take 1 capsule (100 mg total) by mouth 2 (two) times daily.    fexofenadine (ALLEGRA) 60 MG tablet Take 60 mg by mouth every morning.    fish oil-omega-3 fatty acids 300-1,000 mg capsule Take 1 g by mouth once daily.     fluticasone (FLONASE) 50 mcg/actuation nasal spray 1 spray by Each Nare route every morning.     furosemide (LASIX) 40 MG tablet Take 1 tablet (40 mg total) by mouth once daily.    lisinopril (PRINIVIL,ZESTRIL) 20 MG tablet Take 1 tablet (20 mg total) by mouth once daily.    lorazepam (ATIVAN) 0.5 MG tablet Take 0.5 mg by mouth every morning.     magnesium 250 mg Tab Take 250 mg by mouth. 3 in am and 3 pm    montelukast (SINGULAIR) 10 mg tablet Take 10 mg by mouth every evening.    oxybutynin (DITROPAN-XL) 5 MG TR24 Take 5 mg by mouth every evening.   "   pantoprazole (PROTONIX) 40 MG tablet Take 1 tablet (40 mg total) by mouth once daily. (Patient taking differently: Take 40 mg by mouth every morning. )    polyethylene glycol (GLYCOLAX) 17 gram PwPk Take 17 g by mouth once daily.    POLYSORBATE 80/GLYCERIN (REFRESH DRY EYE THERAPY OPHT) Apply to eye 2 (two) times daily.    potassium gluconate 550 mg (90 mg) Tab Take by mouth. 2 in am and 2 pm    psyllium (METAMUCIL) powder Take 1 packet by mouth 3 (three) times daily.    traMADol (ULTRAM) 50 mg tablet Take 1 tablet (50 mg total) by mouth every 6 (six) hours as needed for Pain.     No current facility-administered medications for this visit.      Objective Findings:    Vital Signs:  /66   Pulse 75   Ht 5' 4" (1.626 m)   Wt 96.4 kg (212 lb 8.4 oz)   LMP 01/21/1973 (Approximate)   BMI 36.48 kg/m²   Body mass index is 36.48 kg/m².    Physical Exam:  General Appearance: Well appearing in no acute distress  Head: Normocephalic, without obvious abnormality  Eyes: No scleral icterus, EOMI  ENT: Neck supple, Lips, mucosa, and tongue normal; teeth and gums normal  Lungs: CTA bilaterally in anterior and posterior fields, no wheezes, no crackles.  Heart: Regular rate and rhythm, no murmurs heard  Abdomen: Soft, generalized tenderness upon palpation, non distended with positive bowel sounds in all four quadrants.  Extremities: No clubbing, cyanosis or edema  Skin: No rash to exposed areas  Neurologic: AAOx4    Labs:  Lab Results   Component Value Date    WBC 9.1 11/20/2017    HGB 13.5 11/20/2017    HCT 41.6 11/20/2017     11/20/2017    CHOL 220 (H) 09/28/2016    TRIG 104 09/28/2016    HDL 75 09/28/2016    ALT 16 11/20/2017    AST 19 11/20/2017     11/20/2017    K 4.7 11/20/2017     11/20/2017    CREATININE 0.71 11/20/2017    BUN 15 11/20/2017    CO2 31 11/20/2017    TSH 0.90 01/17/2017    INR 1.1 11/20/2017    HGBA1C 6.7 (H) 05/28/2017     2014 Pt reports last Colonoscopy and hx of colon " polyps.   2/22/16 EGD- normal  5/2017 Abd US- hepatic steatosis  5/2017 Ischemia US- normal   5/2017 Abd CT- No definite CT findings are seen to explain the source of patient's abdominal pain    Assessment:  1. Abdominal pain, epigastric    2. Personal history of colonic polyps       She has had recurrent pain which mildly improves with mesenteric stents and umbilical hernia surgery but then returns in previous form.  Concern for neuropathic pain.    Recommendations:  1. Referral to pain management for options. Possibly neuropathic pain? Consider Gabapentin but hesitant to start new therapies at her age  2. Tramadol refilled in the meantime    Order summary:  Orders Placed This Encounter    Ambulatory consult to Pain Clinic    traMADol (ULTRAM) 50 mg tablet     Thank you so much for allowing me to participate in the care of Adriana Kemp MD

## 2018-05-17 ENCOUNTER — OFFICE VISIT (OUTPATIENT)
Dept: PAIN MEDICINE | Facility: CLINIC | Age: 73
End: 2018-05-17
Attending: ANESTHESIOLOGY
Payer: MEDICARE

## 2018-05-17 VITALS
TEMPERATURE: 98 F | WEIGHT: 214.5 LBS | SYSTOLIC BLOOD PRESSURE: 144 MMHG | HEART RATE: 83 BPM | HEIGHT: 64 IN | DIASTOLIC BLOOD PRESSURE: 73 MMHG | BODY MASS INDEX: 36.62 KG/M2

## 2018-05-17 DIAGNOSIS — G89.29 CHRONIC ABDOMINAL PAIN: Primary | ICD-10-CM

## 2018-05-17 DIAGNOSIS — R19.8 VISCERAL HYPERALGESIA: ICD-10-CM

## 2018-05-17 DIAGNOSIS — R10.9 CHRONIC ABDOMINAL PAIN: Primary | ICD-10-CM

## 2018-05-17 PROCEDURE — 99204 OFFICE O/P NEW MOD 45 MIN: CPT | Mod: GC,S$GLB,, | Performed by: ANESTHESIOLOGY

## 2018-05-17 PROCEDURE — 3078F DIAST BP <80 MM HG: CPT | Mod: CPTII,S$GLB,, | Performed by: ANESTHESIOLOGY

## 2018-05-17 PROCEDURE — 3077F SYST BP >= 140 MM HG: CPT | Mod: CPTII,S$GLB,, | Performed by: ANESTHESIOLOGY

## 2018-05-17 PROCEDURE — 99999 PR PBB SHADOW E&M-EST. PATIENT-LVL III: CPT | Mod: PBBFAC,,, | Performed by: ANESTHESIOLOGY

## 2018-05-17 NOTE — PROGRESS NOTES
Chronic Pain - New Consult    Referring Physician: Efren Kemp MD    Chief Complaint:   Chief Complaint   Patient presents with    Abdominal Pain        SUBJECTIVE:    Adriana Strong presents to the clinic for the evaluation of abdmonial pain. The pain started 4 ago following no specific incident and symptoms have been unchanged.The pain is located in the periumbilical area.  The pain is described as aching, burning and sharp and is rated as 4/10. The pain is rated with a score of  2/10 on the BEST day and a score of 10/10 on the WORST day.  Symptoms interfere with her ADLs The pain is exacerbated by Sitting, Standing, Laying, Eating, Walking and Getting out of bed/chair.  The pain is mitigated by nothing. She reports spending none hours per day reclining. The patient reports 6 hours of uninterrupted sleep per night. States she has seen multiple physicians for the pain and is S/P LIZ stent. She has 100% blockage with the SMA. She has trialed cutting out multiple types of food with no relief. She is S/P umbilical hernia surgery in June 2017 which helped her pain for 6 weeks.   States the pain is primarily in the epigastric region radiating up to the sternum. She has a history of a-fib and is followed by cardiology.   Patient reports vomiting,dizziness and nausea. Mild relief with heating pad. Lots of pain during, before, and after BMs.     Physical Therapy/Home Exercise: no      Pain Disability Index Review:  Last 3 PDI Scores 5/17/2018   Pain Disability Index (PDI) 43       Pain Medications:  Tylenol 650 mg -minimal relief  Opioids- tramadol 50 mg q6h prn-very little relief  Anti-coagulants-plavix and eliquis  Adjuvant medications-never tried gabapentin or muscle relaxers       report:  Reviewed and consistent with medication use as prescribed.    Pain Procedures: None    Imaging:X-Ray Abdomen Flat And Erect     Narrative     2 views of the abdomen were obtained, with AP supine and erect projections  submitted.    Intestinal gas pattern demonstrates gas predominantly within the stomach and colon.  There is no significant distention of large or small bowel loops to specifically suggest mechanical obstruction.  No free intraperitoneal air.  No conventional radiographic evidence of organomegaly or intra-abdominal/pelvic soft tissue mass.  Visualized lower lung zones appear clear.  Multilevel degenerative changes with marginal vertebral endplate spurring throughout the thoracolumbar spine, with severe disc narrowing involving multiple lumbar discs, is seen.  No osseous destructive process.   Impression      No significant intra-abdominal abnormality.  ______________________________________     Electronically signed by: Robbie Feldman MD  Date: 01/20/16         Past Medical History:   Diagnosis Date    Anticoagulant long-term use     on Plavix since May 2015    Anxiety     Arthritis     Asthma     Atrial fibrillation     Cataracts, bilateral     Chest pain, atypical     Colon polyp     Coronary artery disease     Diabetes mellitus     Dry eyes     Esophageal erosions     GERD (gastroesophageal reflux disease)     Heart murmur     Hemorrhoid     High cholesterol     Hypertension     Irritable bowel syndrome     Shingles 2015    TIA (transient ischemic attack)     Use of cane as ambulatory aid      Past Surgical History:   Procedure Laterality Date    ABDOMINAL SURGERY      angiocele      2007, 2014    BREAST SURGERY      left--- a lump--- no cancer    COLONOSCOPY  2014    COLONOSCOPY N/A 3/14/2018    Procedure: COLONOSCOPY;  Surgeon: Efren Kemp MD;  Location: [facility] ([address] [place] [address]);  Service: Endoscopy;  Laterality: N/A;  ok to hold Plavix 5 days prior to procedure per Dr RESHMA Hernandez     ok per Dr Kemp to hold Eliquis 2 days prior to procedure (see telephone encounter dated-2/7/18)    HERNIA REPAIR      HYSTERECTOMY  partial    1982 partial hysterectomy    left nasal polyp      left neck lymph node       nose polyp      right hip fatty mass tissue      stent to small intestine      SUPERIOR VENA CAVA ANGIOPLASTY / STENTING      TONSILLECTOMY      TOTAL ABDOMINAL HYSTERECTOMY      2014    TOTAL KNEE ARTHROPLASTY Bilateral     UPPER GASTROINTESTINAL ENDOSCOPY  2014     Social History     Social History    Marital status:      Spouse name: N/A    Number of children: N/A    Years of education: N/A     Occupational History    Not on file.     Social History Main Topics    Smoking status: Never Smoker    Smokeless tobacco: Never Used    Alcohol use No    Drug use: No    Sexual activity: Not on file     Other Topics Concern    Not on file     Social History Narrative    No narrative on file     Family History   Problem Relation Age of Onset    Colon cancer Neg Hx     Inflammatory bowel disease Neg Hx        Review of patient's allergies indicates:   Allergen Reactions    Celebrex [celecoxib] Shortness Of Breath    Ciprofloxacin Swelling     lip    Dicyclomine Hives    Adhesive Dermatitis    Avelox [moxifloxacin] Swelling    Cilostazol Other (See Comments)     Elevates blood pressure    Eryc [erythromycin] Other (See Comments)     unknown    Iodine and iodide containing products Hives    Keflex [cephalexin] Hives    Meclomen      rashes    Meloxicam      Ear ringing    Metoclopramide Other (See Comments)     High blood pressure    Sulfa (sulfonamide antibiotics) Itching       Current Outpatient Prescriptions   Medication Sig    acetaminophen (TYLENOL ARTHRITIS) 650 MG TbSR Take 1,300 mg by mouth 2 (two) times daily.    albuterol (ACCUNEB) 1.25 mg/3 mL Nebu Take scheduled q4 for the next two days while at home then resume prn dosing    albuterol (VENTOLIN HFA) 90 mcg/actuation inhaler Inhale 2 puffs into the lungs every 6 (six) hours as needed for Wheezing. Rescue    apixaban 5 mg Tab Take 1 tablet (5 mg total) by mouth 2 (two) times daily.    ascorbic acid (VITAMIN C) 500 MG  tablet Take 500 mg by mouth once daily.    azelastine (ASTELIN) 137 mcg nasal spray 1 spray by Nasal route 2 (two) times daily.    budesonide-formoterol 160-4.5 mcg (SYMBICORT) 160-4.5 mcg/actuation HFAA Inhale 2 puffs into the lungs every 12 (twelve) hours. Controller    calcium carbonate (OS-MICHAEL) 600 mg (1,500 mg) Tab Take 1,200 mg by mouth once daily.    clopidogrel (PLAVIX) 75 mg tablet Take 1 tablet (75 mg total) by mouth once daily.    cyanocobalamin (VITAMIN B-12) 1000 MCG tablet Take 100 mcg by mouth once daily.    digoxin (LANOXIN) 125 mcg tablet Take 1 tablet (125 mcg total) by mouth once daily.    diltiaZEM (CARDIZEM SR) 60 MG Cp12 Take 3 capsules (180 mg total) by mouth 2 (two) times daily.    docusate sodium (COLACE) 100 MG capsule Take 1 capsule (100 mg total) by mouth 2 (two) times daily.    fexofenadine (ALLEGRA) 60 MG tablet Take 60 mg by mouth every morning.    fish oil-omega-3 fatty acids 300-1,000 mg capsule Take 1 g by mouth once daily.     fluticasone (FLONASE) 50 mcg/actuation nasal spray 1 spray by Each Nare route every morning.     furosemide (LASIX) 40 MG tablet Take 1 tablet (40 mg total) by mouth once daily.    lisinopril (PRINIVIL,ZESTRIL) 20 MG tablet Take 1 tablet (20 mg total) by mouth once daily.    lorazepam (ATIVAN) 0.5 MG tablet Take 0.5 mg by mouth every morning.     magnesium 250 mg Tab Take 250 mg by mouth. 3 in am and 3 pm    montelukast (SINGULAIR) 10 mg tablet Take 10 mg by mouth every evening.    oxybutynin (DITROPAN-XL) 5 MG TR24 Take 5 mg by mouth every evening.     pantoprazole (PROTONIX) 40 MG tablet Take 1 tablet (40 mg total) by mouth once daily. (Patient taking differently: Take 40 mg by mouth every morning. )    polyethylene glycol (GLYCOLAX) 17 gram PwPk Take 17 g by mouth once daily.    POLYSORBATE 80/GLYCERIN (REFRESH DRY EYE THERAPY OPHT) Apply to eye 2 (two) times daily.    potassium gluconate 550 mg (90 mg) Tab Take by mouth. 2 in am and 2  "pm    psyllium (METAMUCIL) powder Take 1 packet by mouth 3 (three) times daily.    traMADol (ULTRAM) 50 mg tablet Take 1 tablet (50 mg total) by mouth every 6 (six) hours as needed for Pain.     No current facility-administered medications for this visit.        REVIEW OF SYSTEMS:    GENERAL:  No weight loss, malaise or fevers.  HEENT:  Negative for frequent or significant headaches.  NECK:  Negative for lumps, goiter, pain and significant neck swelling.  RESPIRATORY:  Negative for cough, wheezing or shortness of breath.  CARDIOVASCULAR:  Negative for chest pain, leg swelling or palpitations.  GI:  Negative for abdominal discomfort, blood in stools or black stools or change in bowel habits.  MUSCULOSKELETAL:  See HPI.  SKIN:  Negative for lesions, rash, and itching.  PSYCH:  + for sleep disturbance, mood disorder and recent psychosocial stressors.  HEMATOLOGY/LYMPHOLOGY:  Negative for prolonged bleeding, bruising easily or swollen nodes.  NEURO:   No history of headaches, syncope, paralysis, seizures or tremors.  All other reviewed and negative other than HPI.    OBJECTIVE:    BP (!) 144/73   Pulse 83   Temp 98.1 °F (36.7 °C)   Ht 5' 4" (1.626 m)   Wt 97.3 kg (214 lb 8.1 oz)   LMP 01/21/1973 (Approximate)   BMI 36.82 kg/m²     PHYSICAL EXAMINATION:    General appearance: Well appearing, in no acute distress, alert and oriented x3.  Psych:  Mood and affect appropriate.  Skin: Skin color, texture, turgor normal, no rashes or lesions, in both upper and lower body.  Head/face:  Normocephalic, atraumatic. No palpable lymph nodes.  Neck: No pain to palpation over the cervical paraspinous muscles. Spurling Negative. No pain with neck flexion, extension, or lateral flexion.   Cor: RRR  Pulm: CTA  GI:  + epigastric tenderness to palpation. No significant hernia present on palpation.   Back: Straight leg raising in the sitting and supine positions is negative to radicular pain. No pain to palpation over the spine or " costovertebral angles. Normal range of motion without pain reproduction.  Extremities: Peripheral joint ROM is full and pain free without obvious instability or laxity in all four extremities. No deformities, edema, or skin discoloration. Good capillary refill.  Musculoskeletal: Shoulder, hip, sacroiliac and knee provocative maneuvers are negative. Bilateral upper and lower extremity strength is normal and symmetric.  No atrophy or tone abnormalities are noted.  Neuro: Bilateral upper and lower extremity coordination and muscle stretch reflexes are physiologic and symmetric.  Plantar response are downgoing. No loss of sensation is noted.  Gait: normal.    ASSESSMENT: 73 y.o. year old female with abdominal pain, consistent with      1. Chronic abdominal pain     2. Visceral hyperalgesia           PLAN:     - I have stressed the importance of physical activity and a home exercise plan to help with pain and improve health.  - recommended to Start Neurontin 300mg gradually to three times a day to help with neuropathic component of pain. Recommend titrating this medication up to 2700 mg daily  -If suboptimal relief with gabapentin, she may benefit from trial of lyrica up to 300 mg daily and/or cymbalta 30 mg bid  - I explained to the patient that I do not recommend long term opioid therapy. Pt counseled about the side effects of long term use of opioids including dependence, tolerance, addiction, respiratory depression, somnelence , immune and endocrine dysfunction.  - Discussed diagnostic bilateral celiac plexus blocks. Will need to get clearance to hold the eliquis for 3 days and plavix for 7 days prior to the procedure.  -If she gets good relief from the blocks, we discussed referral to Dr. Jimenez for future endoscopic neurolysis.   -RTC 4 weeks after the celiac plexus blocks  - Counseled patient regarding the importance of activity modification and physical therapy.    Kerwin Hernandez DO  [facility]    The  above plan and management options were discussed at length with patient. Patient is in agreement with the above and verbalized understanding. It will be communicated with the referring physician via electronic record, fax, or mail.     I have personally reviewed the history and exam of this patient and agree with the resident/fellow/NPs note as stated above.    Harley Ceja MD    05/17/2018

## 2018-05-17 NOTE — LETTER
May 20, 2018      Efren Kemp MD  [address]  [facility] [address]           [facility]  [address]  [facility] [address]  Phone: [phone]  Fax: [phone]          Patient: Adriana Strong   MR Number: [id number]   YOB: 1945   Date of Visit: 5/17/2018       Dear Dr. Efren Kemp:    Thank you for referring Adriana Strong to me for evaluation. Attached you will find relevant portions of my assessment and plan of care.    If you have questions, please do not hesitate to call me. I look forward to following Adriana Strong along with you.    Sincerely,    Harley Ceja MD    Enclosure  CC:  No Recipients    If you would like to receive this communication electronically, please contact [email or web address] or [phone] to request more information on [employer] access.    For providers and/or their staff who would like to refer a patient to [facility], please contact us through our one-stop-shop provider referral line, [facility], at [phone].    If you feel you have received this communication in error or would no longer like to receive these types of communications, please e-mail [email or web address]

## 2018-05-18 ENCOUNTER — TELEPHONE (OUTPATIENT)
Dept: PAIN MEDICINE | Facility: CLINIC | Age: 73
End: 2018-05-18

## 2018-05-18 ENCOUNTER — PATIENT MESSAGE (OUTPATIENT)
Dept: GASTROENTEROLOGY | Facility: CLINIC | Age: 73
End: 2018-05-18

## 2018-05-18 ENCOUNTER — TELEPHONE (OUTPATIENT)
Dept: CARDIOLOGY | Facility: CLINIC | Age: 73
End: 2018-05-18

## 2018-05-18 ENCOUNTER — TELEPHONE (OUTPATIENT)
Dept: GASTROENTEROLOGY | Facility: CLINIC | Age: 73
End: 2018-05-18

## 2018-05-18 RX ORDER — GABAPENTIN 300 MG/1
300 CAPSULE ORAL 3 TIMES DAILY
Qty: 90 CAPSULE | Refills: 11 | Status: SHIPPED | OUTPATIENT
Start: 2018-05-18 | End: 2019-02-01 | Stop reason: SDUPTHER

## 2018-05-18 NOTE — TELEPHONE ENCOUNTER
Pt phoned. Pt did not  at either number.  There is a small risk of stroke due to atrial fibrillation if pt holds the Eliquis for procedure.  There is a small risk of inferior mesenteric artery stent thrombosis if pt holds the Plavix for the procedure. Benefit of procedure has to be weighed against these risks or the risk of bleeding if procedure is performed while taking the Plavix and Eliquis.

## 2018-05-18 NOTE — TELEPHONE ENCOUNTER
----- Message from Nena Young sent at 5/18/2018 10:34 AM CDT -----  Contact: Daughter- Tammy- [phone]  Ray- pts daughter called to discuss getting the pt an rx for gabapentin- pt uses the pharmacy listed on file- says Dr. Ceja is referring the pt for this pain medication- please contact Tammy at [phone]

## 2018-05-18 NOTE — TELEPHONE ENCOUNTER
----- Message from Nena Young sent at 5/18/2018 10:34 AM CDT -----  Contact: Daughter- Tammy- [phone]  Ray- pts daughter called to discuss getting the pt an rx for gabapentin- pt uses the pharmacy listed on file- says Dr. Ceja is referring the pt for this pain medication- please contact Tammy at [phone]

## 2018-05-18 NOTE — TELEPHONE ENCOUNTER
----- Message from Claudia Grant sent at 5/18/2018 11:56 AM CDT -----  Regarding: eliquis/plavix clearance  Dr. Ceja, please see MD reply. Please advise if you will move forward with procedure:      Conversation   (Newest Message First)              5/18/18 10:58 AM      Jr Carrington MD attempted to contact Adriana Strong (Not Available)   Jr Carrington MD          5/18/18 10:58 AM   Note          Pt phoned. Pt did not  at either number.  There is a small risk of stroke due to atrial fibrillation if pt holds the Eliquis for procedure.  There is a small risk of inferior mesenteric artery stent thrombosis if pt holds the Plavix for the procedure. Benefit of procedure has to be weighed against these risks or the risk of bleeding if procedure is performed while taking the Plavix and Eliquis.               5/18/18 10:58 AM      Jr Carrington MD contacted Adriana Strong    Medication Renewals and Changes       None       Medication List

## 2018-05-25 ENCOUNTER — TELEPHONE (OUTPATIENT)
Dept: PAIN MEDICINE | Facility: CLINIC | Age: 73
End: 2018-05-25

## 2018-05-25 NOTE — TELEPHONE ENCOUNTER
----- Message from Claudia Grant sent at 5/24/2018  8:44 AM CDT -----  Regarding: FW: Plavix clearance  [place], the pt has two providers monitoring eliquis and plavix. Dr. Joana Hernandez manages plavix and has given clearance of Plavix; HOWEVER, I spoke with the patient this morning, pt stated she started taking Gabapentin last week and would like to continue the medication to determine if pt gets any relief from it. Patient stated will call when ready to schedule procedure.    ----- Message -----  From: Kimberly Al LPN  Sent: 5/18/2018   2:44 PM  To: Claudia Grant  Subject: FW: Plavix clearance                                 ----- Message -----  From: Joana Hernandez MD  Sent: 5/18/2018   1:40 PM  To: Kimberly Al LPN  Subject: RE: Plavix clearance                             Yes.    ----- Message -----  From: Kimberly Al LPN  Sent: 5/18/2018  12:49 PM  To: Joana Hernandez MD  Subject: FW: Plavix clearance                                 ----- Message -----  From: Claudia Grant  Sent: 5/18/2018  10:43 AM  To: David Baron  Subject: Plavix clearance                                 Requesting clearance of Plavix for 7 days, patient needs to be scheduled with Dr. Ceja for a Bilateral diagnostic Celiac Plexus Block.     Please advise if patient may hold medication prior to procedure date.

## 2018-07-17 ENCOUNTER — OFFICE VISIT (OUTPATIENT)
Dept: CARDIOLOGY | Facility: CLINIC | Age: 73
End: 2018-07-17
Payer: MEDICARE

## 2018-07-17 VITALS
SYSTOLIC BLOOD PRESSURE: 123 MMHG | WEIGHT: 218.38 LBS | DIASTOLIC BLOOD PRESSURE: 65 MMHG | HEART RATE: 53 BPM | HEIGHT: 64 IN | BODY MASS INDEX: 37.28 KG/M2

## 2018-07-17 DIAGNOSIS — I10 ESSENTIAL HYPERTENSION: ICD-10-CM

## 2018-07-17 DIAGNOSIS — I48.19 PERSISTENT ATRIAL FIBRILLATION: ICD-10-CM

## 2018-07-17 DIAGNOSIS — J45.20 MILD INTERMITTENT ASTHMA WITHOUT COMPLICATION: ICD-10-CM

## 2018-07-17 DIAGNOSIS — K55.1 MESENTERIC ARTERY INSUFFICIENCY: ICD-10-CM

## 2018-07-17 DIAGNOSIS — I63.81 ACUTE THALAMIC INFARCTION: ICD-10-CM

## 2018-07-17 DIAGNOSIS — J44.9 CHRONIC OBSTRUCTIVE PULMONARY DISEASE, UNSPECIFIED COPD TYPE: ICD-10-CM

## 2018-07-17 DIAGNOSIS — Z79.01 CHRONIC ANTICOAGULATION: ICD-10-CM

## 2018-07-17 DIAGNOSIS — I48.20 CHRONIC ATRIAL FIBRILLATION: Primary | ICD-10-CM

## 2018-07-17 DIAGNOSIS — R10.10 PAIN OF UPPER ABDOMEN: ICD-10-CM

## 2018-07-17 DIAGNOSIS — E11.9 CONTROLLED TYPE 2 DIABETES MELLITUS WITHOUT COMPLICATION, WITHOUT LONG-TERM CURRENT USE OF INSULIN: ICD-10-CM

## 2018-07-17 DIAGNOSIS — E78.5 HYPERLIPIDEMIA, UNSPECIFIED HYPERLIPIDEMIA TYPE: ICD-10-CM

## 2018-07-17 DIAGNOSIS — R07.89 OTHER CHEST PAIN: ICD-10-CM

## 2018-07-17 DIAGNOSIS — I35.0 MODERATE AORTIC STENOSIS: ICD-10-CM

## 2018-07-17 DIAGNOSIS — I48.91 ATRIAL FIBRILLATION WITH RVR: ICD-10-CM

## 2018-07-17 DIAGNOSIS — Z86.73 OLD LACUNAR STROKE WITHOUT LATE EFFECT: ICD-10-CM

## 2018-07-17 PROCEDURE — 99999 PR PBB SHADOW E&M-EST. PATIENT-LVL II: CPT | Mod: PBBFAC,,, | Performed by: INTERNAL MEDICINE

## 2018-07-17 PROCEDURE — 99214 OFFICE O/P EST MOD 30 MIN: CPT | Mod: S$GLB,,, | Performed by: INTERNAL MEDICINE

## 2018-07-17 PROCEDURE — 3074F SYST BP LT 130 MM HG: CPT | Mod: CPTII,S$GLB,, | Performed by: INTERNAL MEDICINE

## 2018-07-17 PROCEDURE — 3078F DIAST BP <80 MM HG: CPT | Mod: CPTII,S$GLB,, | Performed by: INTERNAL MEDICINE

## 2018-07-17 PROCEDURE — 93000 ELECTROCARDIOGRAM COMPLETE: CPT | Mod: S$GLB,,, | Performed by: INTERNAL MEDICINE

## 2018-07-17 NOTE — PROGRESS NOTES
Subjective:      Patient ID: Adriana Strong is a 73 y.o. female.    Chief Complaint: Follow-up    HPI:  Pt has been having less abdominal pain since taking gabapentin for celic disease and gabapentin.   Pt is trying a gluten-free diet.    Pt has occasional left anterior chest pains which last an hour and occur at rest.     Pt has no chest pains with exertion.     Review of Systems   Cardiovascular: Positive for chest pain. Negative for claudication, dyspnea on exertion, irregular heartbeat, leg swelling, near-syncope, orthopnea, palpitations and syncope.      Knees feel weak and give out at times.  Pt takes Tylenol prn.   Past Medical History:   Diagnosis Date    Anticoagulant long-term use     on Plavix since May 2015    Anxiety     Arthritis     Asthma     Atrial fibrillation     Cataracts, bilateral     Chest pain, atypical     Colon polyp     Coronary artery disease     Diabetes mellitus     Dry eyes     Esophageal erosions     GERD (gastroesophageal reflux disease)     Heart murmur     Hemorrhoid     High cholesterol     Hypertension     Irritable bowel syndrome     Shingles 2015    TIA (transient ischemic attack)     Use of cane as ambulatory aid         Past Surgical History:   Procedure Laterality Date    ABDOMINAL SURGERY      angiocele      2007, 2014    BREAST SURGERY      left--- a lump--- no cancer    COLONOSCOPY  2014    COLONOSCOPY N/A 3/14/2018    Procedure: COLONOSCOPY;  Surgeon: Efren Kemp MD;  Location: [address]);  Service: Endoscopy;  Laterality: N/A;  ok to hold Plavix 5 days prior to procedure per Dr RESHMA Hernandez     ok per Dr Kemp to hold Eliquis 2 days prior to procedure (see telephone encounter dated-2/7/18)    HERNIA REPAIR      HYSTERECTOMY  partial    1982 partial hysterectomy    left nasal polyp      left neck lymph node      nose polyp      right hip fatty mass tissue      stent to small intestine      SUPERIOR VENA CAVA ANGIOPLASTY / STENTING       TONSILLECTOMY      TOTAL ABDOMINAL HYSTERECTOMY      2014    TOTAL KNEE ARTHROPLASTY Bilateral     UPPER GASTROINTESTINAL ENDOSCOPY  2014       Family History   Problem Relation Age of Onset    Colon cancer Neg Hx     Inflammatory bowel disease Neg Hx        Social History     Social History    Marital status:      Spouse name: N/A    Number of children: N/A    Years of education: N/A     Social History Main Topics    Smoking status: Never Smoker    Smokeless tobacco: Never Used    Alcohol use No    Drug use: No    Sexual activity: Not Asked     Other Topics Concern    None     Social History Narrative    None       Current Outpatient Prescriptions on File Prior to Visit   Medication Sig Dispense Refill    acetaminophen (TYLENOL ARTHRITIS) 650 MG TbSR Take 1,300 mg by mouth 2 (two) times daily.      albuterol (ACCUNEB) 1.25 mg/3 mL Nebu Take scheduled q4 for the next two days while at home then resume prn dosing 3 mL 1    albuterol (VENTOLIN HFA) 90 mcg/actuation inhaler Inhale 2 puffs into the lungs every 6 (six) hours as needed for Wheezing. Rescue      ascorbic acid (VITAMIN C) 500 MG tablet Take 500 mg by mouth once daily.      azelastine (ASTELIN) 137 mcg nasal spray 1 spray by Nasal route 2 (two) times daily.      budesonide-formoterol 160-4.5 mcg (SYMBICORT) 160-4.5 mcg/actuation HFAA Inhale 2 puffs into the lungs every 12 (twelve) hours. Controller      calcium carbonate (OS-MICHAEL) 600 mg (1,500 mg) Tab Take 1,200 mg by mouth once daily.      clopidogrel (PLAVIX) 75 mg tablet Take 1 tablet (75 mg total) by mouth once daily. 90 tablet 4    cyanocobalamin (VITAMIN B-12) 1000 MCG tablet Take 100 mcg by mouth once daily.      digoxin (LANOXIN) 125 mcg tablet Take 1 tablet (125 mcg total) by mouth once daily. 90 tablet 3    diltiaZEM (CARDIZEM SR) 60 MG Cp12 Take 3 capsules (180 mg total) by mouth 2 (two) times daily. 180 capsule 3    docusate sodium (COLACE) 100 MG capsule  Take 1 capsule (100 mg total) by mouth 2 (two) times daily. 60 capsule 1    fexofenadine (ALLEGRA) 60 MG tablet Take 60 mg by mouth every morning.      fish oil-omega-3 fatty acids 300-1,000 mg capsule Take 1 g by mouth once daily.       fluticasone (FLONASE) 50 mcg/actuation nasal spray 1 spray by Each Nare route every morning.       furosemide (LASIX) 40 MG tablet Take 1 tablet (40 mg total) by mouth once daily. 90 tablet 3    gabapentin (NEURONTIN) 300 MG capsule Take 1 capsule (300 mg total) by mouth 3 (three) times daily. 90 capsule 11    lisinopril (PRINIVIL,ZESTRIL) 20 MG tablet Take 1 tablet (20 mg total) by mouth once daily. 90 tablet 3    lorazepam (ATIVAN) 0.5 MG tablet Take 0.5 mg by mouth every morning.       magnesium 250 mg Tab Take 250 mg by mouth. 3 in am and 3 pm      montelukast (SINGULAIR) 10 mg tablet Take 10 mg by mouth every evening.      oxybutynin (DITROPAN-XL) 5 MG TR24 Take 5 mg by mouth every evening.       pantoprazole (PROTONIX) 40 MG tablet Take 1 tablet (40 mg total) by mouth once daily. (Patient taking differently: Take 40 mg by mouth every morning. ) 90 tablet 3    polyethylene glycol (GLYCOLAX) 17 gram PwPk Take 17 g by mouth once daily.      POLYSORBATE 80/GLYCERIN (REFRESH DRY EYE THERAPY OPHT) Apply to eye 2 (two) times daily.      potassium gluconate 550 mg (90 mg) Tab Take by mouth. 2 in am and 2 pm      psyllium (METAMUCIL) powder Take 1 packet by mouth 3 (three) times daily.      traMADol (ULTRAM) 50 mg tablet Take 1 tablet (50 mg total) by mouth every 6 (six) hours as needed for Pain. 90 tablet 3    [DISCONTINUED] apixaban 5 mg Tab Take 1 tablet (5 mg total) by mouth 2 (two) times daily. 180 tablet 3     No current facility-administered medications on file prior to visit.        Review of patient's allergies indicates:   Allergen Reactions    Celebrex [celecoxib] Shortness Of Breath    Ciprofloxacin Swelling     lip    Dicyclomine Hives    Adhesive  "Dermatitis    Avelox [moxifloxacin] Swelling    Cilostazol Other (See Comments)     Elevates blood pressure    Eryc [erythromycin] Other (See Comments)     unknown    Iodine and iodide containing products Hives    Keflex [cephalexin] Hives    Meclomen      rashes    Meloxicam      Ear ringing    Metoclopramide Other (See Comments)     High blood pressure    Sulfa (sulfonamide antibiotics) Itching     Objective:     Vitals:    07/17/18 1032   BP: 123/65   BP Location: Right arm   Patient Position: Sitting   BP Method: Large (Automatic)   Pulse: (!) 53   Weight: 99.1 kg (218 lb 6.4 oz)   Height: 5' 4" (1.626 m)        Physical Exam   Constitutional: She is oriented to person, place, and time. She appears well-developed and well-nourished.   Eyes: No scleral icterus.   Neck: No JVD present. Carotid bruit is not present.   Cardiovascular: Normal rate.  An irregularly irregular rhythm present. Exam reveals no gallop.    Murmur (III/VI systolic) heard.  Pulmonary/Chest: Breath sounds normal.   Musculoskeletal: She exhibits edema (trace pitting pedal edema).   Neurological: She is alert and oriented to person, place, and time.   Skin: Skin is warm and dry.   Psychiatric: She has a normal mood and affect. Her behavior is normal. Judgment and thought content normal.   Vitals reviewed.     Note echocardiogram 4/18 showed moderate aortic stenosis and normal LVEF    Note stress echo in 2016 was normal    Lab from Dr Go---wrong pt's labs given to pt    ECG: atrial fibrillation with controlled ventricular response, nonspecific ST segment depression V5, V6,  No change compared with ECG 1/18    Assessment:     1. Chronic atrial fibrillation    2. Persistent atrial fibrillation    3. Acute thalamic infarction    4. Old lacunar stroke without late effect    5. Mild intermittent asthma without complication    6. Chronic obstructive pulmonary disease, unspecified COPD type    7. Essential hypertension    8. " Hyperlipidemia, unspecified hyperlipidemia type    9. Atrial fibrillation with RVR    10. Chronic anticoagulation    11. Pain of upper abdomen    12. BMI 38.0-38.9,adult    13. Controlled type 2 diabetes mellitus without complication, without long-term current use of insulin    14. Other chest pain    15. Moderate aortic stenosis    16. Mesenteric artery insufficiency      Plan:   Adriana was seen today for follow-up.    Diagnoses and all orders for this visit:    Chronic atrial fibrillation  -     IN OFFICE EKG 12-LEAD (to Muse)  -     Pharmacological stress echo; Future    Persistent atrial fibrillation  -     IN OFFICE EKG 12-LEAD (to Muse)  -     apixaban 5 mg Tab; Take 1 tablet (5 mg total) by mouth 2 (two) times daily.    Acute thalamic infarction    Old lacunar stroke without late effect    Mild intermittent asthma without complication    Chronic obstructive pulmonary disease, unspecified COPD type    Essential hypertension    Hyperlipidemia, unspecified hyperlipidemia type    Atrial fibrillation with RVR    Chronic anticoagulation    Pain of upper abdomen    BMI 38.0-38.9,adult    Controlled type 2 diabetes mellitus without complication, without long-term current use of insulin    Other chest pain  -     Pharmacological stress echo; Future    Moderate aortic stenosis    Mesenteric artery insufficiency    Due to chest pains and abnormal ECG will repeat dobutamine stress echocardiogram     Same meds    Follow-up in about 6 months (around 1/17/2019).

## 2018-07-18 ENCOUNTER — TELEPHONE (OUTPATIENT)
Dept: CARDIOLOGY | Facility: CLINIC | Age: 73
End: 2018-07-18

## 2018-07-18 NOTE — TELEPHONE ENCOUNTER
Spoke to patient.     ----- Message from Dotty Vazquez sent at 7/18/2018  9:21 AM CDT -----  Contact: [phone]/self   Pt states she's returning your call  Please call and advise

## 2018-07-18 NOTE — TELEPHONE ENCOUNTER
Spoke to patient.  Received the correct labs from Dr Go and they have been reviewed by Dr Carrington.    ----- Message from Ricky Hernandez sent at 7/18/2018 10:14 AM CDT -----  Contact: Patient [phone]  Patient came into the office wanted to speak with Leia. States she received a msg stating there was a mishap with her blood work? Please call to advise.

## 2018-07-25 ENCOUNTER — HOSPITAL ENCOUNTER (OUTPATIENT)
Dept: CARDIOLOGY | Facility: HOSPITAL | Age: 73
Discharge: HOME | End: 2018-07-25
Attending: INTERNAL MEDICINE
Payer: MEDICARE

## 2018-07-25 DIAGNOSIS — I48.20 CHRONIC ATRIAL FIBRILLATION: ICD-10-CM

## 2018-07-25 DIAGNOSIS — R07.89 OTHER CHEST PAIN: ICD-10-CM

## 2018-07-25 PROCEDURE — 93351 STRESS TTE COMPLETE: CPT | Mod: 26,,, | Performed by: INTERNAL MEDICINE

## 2018-07-25 PROCEDURE — 93321 DOPPLER ECHO F-UP/LMTD STD: CPT

## 2018-07-25 PROCEDURE — 93321 DOPPLER ECHO F-UP/LMTD STD: CPT | Mod: 26,,, | Performed by: INTERNAL MEDICINE

## 2018-07-25 NOTE — NURSING
0930    Patient on table, ready for test.  Allergies/history confirmed.  Connected to monitor EKG and NIBP  0935    AMELIA Martinez NP @ BS, consent obtained, all questions answered.  0938    Test started per protocol, See EKG sheet for VS   0941   Dobutamine increased to 20mcg per protocol to increase heart rate.  Pt tolerating test well   0944  Dobutamine increased to 30 mcg per protocol to increase heart rate, pt tolerating test well.    0946.    Target heart rate achieved.    0947     Testing phase complete, dobutamine off.  NS up at rapid rate for recovery phase   1003     Recovery phase complete.  Patient states she feels normal, no distress.  NS dc'd 200cc infused.               Iv d/c'd.  Pt released to Cardio.

## 2018-07-26 ENCOUNTER — TELEPHONE (OUTPATIENT)
Dept: CARDIOLOGY | Facility: CLINIC | Age: 73
End: 2018-07-26

## 2018-07-26 LAB — RETIRED EF AND QEF - SEE NOTES: 65 (ref 55–65)

## 2018-07-26 NOTE — TELEPHONE ENCOUNTER
Although dobutamine stress ECG is abnormal, the stress echocardiogram is normal.  No evidence of obstructive CAD.  Pt reassured.

## 2018-08-21 RX ORDER — DILTIAZEM HYDROCHLORIDE 60 MG/1
180 CAPSULE, EXTENDED RELEASE ORAL 2 TIMES DAILY
Qty: 180 CAPSULE | Refills: 3 | Status: SHIPPED | OUTPATIENT
Start: 2018-08-21 | End: 2018-12-04 | Stop reason: SDUPTHER

## 2018-11-30 DIAGNOSIS — K55.1 CHRONIC MESENTERIC ISCHEMIA: Primary | ICD-10-CM

## 2018-12-04 DIAGNOSIS — I48.91 ATRIAL FIBRILLATION WITH RVR: ICD-10-CM

## 2018-12-04 RX ORDER — DIGOXIN 125 MCG
125 TABLET ORAL DAILY
Qty: 90 TABLET | Refills: 3 | Status: ON HOLD | OUTPATIENT
Start: 2018-12-04 | End: 2019-11-27 | Stop reason: HOSPADM

## 2018-12-04 RX ORDER — DILTIAZEM HYDROCHLORIDE 60 MG/1
180 CAPSULE, EXTENDED RELEASE ORAL 2 TIMES DAILY
Qty: 540 CAPSULE | Refills: 3 | Status: SHIPPED | OUTPATIENT
Start: 2018-12-04 | End: 2019-01-24

## 2019-01-10 ENCOUNTER — HOSPITAL ENCOUNTER (EMERGENCY)
Facility: HOSPITAL | Age: 74
Discharge: HOME | End: 2019-01-10
Attending: EMERGENCY MEDICINE
Payer: MEDICARE

## 2019-01-10 VITALS
DIASTOLIC BLOOD PRESSURE: 62 MMHG | HEIGHT: 64 IN | OXYGEN SATURATION: 98 % | WEIGHT: 223 LBS | BODY MASS INDEX: 38.07 KG/M2 | SYSTOLIC BLOOD PRESSURE: 151 MMHG | HEART RATE: 88 BPM | RESPIRATION RATE: 14 BRPM | TEMPERATURE: 98 F

## 2019-01-10 DIAGNOSIS — R53.1 WEAKNESS: ICD-10-CM

## 2019-01-10 DIAGNOSIS — R10.84 GENERALIZED ABDOMINAL PAIN: Primary | ICD-10-CM

## 2019-01-10 LAB
ALBUMIN SERPL BCP-MCNC: 3.5 G/DL
ALP SERPL-CCNC: 60 U/L
ALT SERPL W/O P-5'-P-CCNC: 45 U/L
ANION GAP SERPL CALC-SCNC: 12 MMOL/L
AST SERPL-CCNC: 43 U/L
BACTERIA #/AREA URNS HPF: ABNORMAL /HPF
BASOPHILS # BLD AUTO: 0.02 K/UL
BASOPHILS NFR BLD: 0.2 %
BILIRUB SERPL-MCNC: 0.2 MG/DL
BILIRUB UR QL STRIP: NEGATIVE
BUN SERPL-MCNC: 13 MG/DL
CALCIUM SERPL-MCNC: 9.5 MG/DL
CHLORIDE SERPL-SCNC: 96 MMOL/L
CLARITY UR: CLEAR
CO2 SERPL-SCNC: 27 MMOL/L
COLOR UR: ABNORMAL
CREAT SERPL-MCNC: 0.8 MG/DL
DIFFERENTIAL METHOD: ABNORMAL
EOSINOPHIL # BLD AUTO: 0 K/UL
EOSINOPHIL NFR BLD: 0.2 %
ERYTHROCYTE [DISTWIDTH] IN BLOOD BY AUTOMATED COUNT: 14.1 %
EST. GFR  (AFRICAN AMERICAN): >60 ML/MIN/1.73 M^2
EST. GFR  (NON AFRICAN AMERICAN): >60 ML/MIN/1.73 M^2
GLUCOSE SERPL-MCNC: 168 MG/DL
GLUCOSE UR QL STRIP: NEGATIVE
HCT VFR BLD AUTO: 44.6 %
HGB BLD-MCNC: 14.7 G/DL
HGB UR QL STRIP: NEGATIVE
HYALINE CASTS #/AREA URNS LPF: 0 /LPF
KETONES UR QL STRIP: NEGATIVE
LEUKOCYTE ESTERASE UR QL STRIP: ABNORMAL
LIPASE SERPL-CCNC: 16 U/L
LYMPHOCYTES # BLD AUTO: 3.1 K/UL
LYMPHOCYTES NFR BLD: 29 %
MCH RBC QN AUTO: 31.1 PG
MCHC RBC AUTO-ENTMCNC: 33 G/DL
MCV RBC AUTO: 94 FL
MICROSCOPIC COMMENT: ABNORMAL
MONOCYTES # BLD AUTO: 1 K/UL
MONOCYTES NFR BLD: 9.2 %
NEUTROPHILS # BLD AUTO: 6.4 K/UL
NEUTROPHILS NFR BLD: 60.9 %
NITRITE UR QL STRIP: NEGATIVE
PH UR STRIP: 7 [PH] (ref 5–8)
PLATELET # BLD AUTO: 286 K/UL
PMV BLD AUTO: 9.8 FL
POTASSIUM SERPL-SCNC: 3.9 MMOL/L
PROT SERPL-MCNC: 7.5 G/DL
PROT UR QL STRIP: ABNORMAL
RBC # BLD AUTO: 4.73 M/UL
RBC #/AREA URNS HPF: 0 /HPF (ref 0–4)
SODIUM SERPL-SCNC: 135 MMOL/L
SP GR UR STRIP: 1.01 (ref 1–1.03)
TROPONIN I SERPL DL<=0.01 NG/ML-MCNC: <0.006 NG/ML
URN SPEC COLLECT METH UR: ABNORMAL
UROBILINOGEN UR STRIP-ACNC: NEGATIVE EU/DL
WBC # BLD AUTO: 10.5 K/UL
WBC #/AREA URNS HPF: 50 /HPF (ref 0–5)
WBC CLUMPS URNS QL MICRO: ABNORMAL

## 2019-01-10 PROCEDURE — 87077 CULTURE AEROBIC IDENTIFY: CPT

## 2019-01-10 PROCEDURE — 81000 URINALYSIS NONAUTO W/SCOPE: CPT

## 2019-01-10 PROCEDURE — 93005 ELECTROCARDIOGRAM TRACING: CPT

## 2019-01-10 PROCEDURE — 84484 ASSAY OF TROPONIN QUANT: CPT

## 2019-01-10 PROCEDURE — 80053 COMPREHEN METABOLIC PANEL: CPT

## 2019-01-10 PROCEDURE — 87088 URINE BACTERIA CULTURE: CPT

## 2019-01-10 PROCEDURE — 85025 COMPLETE CBC W/AUTO DIFF WBC: CPT

## 2019-01-10 PROCEDURE — 83690 ASSAY OF LIPASE: CPT

## 2019-01-10 PROCEDURE — 87086 URINE CULTURE/COLONY COUNT: CPT

## 2019-01-10 PROCEDURE — 93010 EKG 12-LEAD: ICD-10-PCS | Mod: ,,, | Performed by: INTERNAL MEDICINE

## 2019-01-10 PROCEDURE — 93010 ELECTROCARDIOGRAM REPORT: CPT | Mod: ,,, | Performed by: INTERNAL MEDICINE

## 2019-01-10 PROCEDURE — 87186 SC STD MICRODIL/AGAR DIL: CPT

## 2019-01-10 PROCEDURE — 99284 EMERGENCY DEPT VISIT MOD MDM: CPT

## 2019-01-10 RX ORDER — TRAMADOL HYDROCHLORIDE 50 MG/1
50 TABLET ORAL EVERY 6 HOURS PRN
Qty: 20 TABLET | Refills: 0 | Status: ON HOLD | OUTPATIENT
Start: 2019-01-10 | End: 2020-10-21 | Stop reason: HOSPADM

## 2019-01-10 NOTE — ED PROVIDER NOTES
Encounter Date: 1/10/2019    SCRIBE #1 NOTE: I, Lynette Logan, am scribing for, and in the presence of,  Dr. Pandey. I have scribed the entire note.       History     Chief Complaint   Patient presents with    Abdominal Pain     generalized abdominal pain, bloating, nausea, 1 episode vomiting. Denies diarrhea.      Adriana Strong is a 74 y.o. female who  has a past medical history of Anticoagulant long-term use, Anxiety, Arthritis, Asthma, Atrial fibrillation, Cataracts, bilateral, Chest pain, atypical, Colon polyp, Coronary artery disease, Diabetes mellitus, Dry eyes, Esophageal erosions, GERD (gastroesophageal reflux disease), Heart murmur, Hemorrhoid, High cholesterol, Hypertension, Irritable bowel syndrome, Shingles (2015), TIA (transient ischemic attack), and Use of cane as ambulatory aid.    The patient presents to the ED due to generalized abdominal pain. She reports onset of symptoms was a few months ago. However, she notes the pain began to get worse this week and today around noon the pain became significantly worse. She states the pain is throughout the abdomen but worse in the center. The patient notes associated nausea and vomiting but denies diarrhea, urinary symptoms, fever or chills. She denies use of any medications for the symptoms. The patient is s/p hiatal hernia repair and hysterectomy years ago.      The history is provided by the patient.     Review of patient's allergies indicates:   Allergen Reactions    Celebrex [celecoxib] Shortness Of Breath    Ciprofloxacin Swelling     lip    Dicyclomine Hives    Adhesive Dermatitis    Avelox [moxifloxacin] Swelling    Cilostazol Other (See Comments)     Elevates blood pressure    Eryc [erythromycin] Other (See Comments)     unknown    Iodine and iodide containing products Hives    Keflex [cephalexin] Hives    Meclomen      rashes    Meloxicam      Ear ringing    Metoclopramide Other (See Comments)     High blood pressure    Sulfa  (sulfonamide antibiotics) Itching     Past Medical History:   Diagnosis Date    Anticoagulant long-term use     on Plavix since May 2015    Anxiety     Arthritis     Asthma     Atrial fibrillation     Cataracts, bilateral     Chest pain, atypical     Colon polyp     Coronary artery disease     Diabetes mellitus     Dry eyes     Esophageal erosions     GERD (gastroesophageal reflux disease)     Heart murmur     Hemorrhoid     High cholesterol     Hypertension     Irritable bowel syndrome     Shingles 2015    TIA (transient ischemic attack)     Use of cane as ambulatory aid      Past Surgical History:   Procedure Laterality Date    ABDOMINAL SURGERY      angiocele      2007, 2014    ANGIOGRAM-ABDOMINAL N/A 5/5/2015    Performed by [facility] MD at [facility] OR [address] [place] [address]    ANGIOGRAM-ABDOMINAL SMA stent Left Brachial access. Right arm over head. Left 4/29/2015    Performed by Joana Hernandez MD at [facility] OR [address] [place] [address]    ANGIOGRAM-ABDOMINAL-INTESTINAL - Mesenteric angiogram Right CFA access, possible left arm access  Right 1/5/2016    Performed by Joana Hernandez MD at [facility] OR [address] [place] [address]    ANGIOGRAM-MESENTERIC  1/5/2016    Performed by Joana Hernandez MD at [facility] OR [address] [place] [address]    BREAST SURGERY      left--- a lump--- no cancer    COLONOSCOPY  2014    COLONOSCOPY N/A 3/14/2018    Performed by Efren Kemp MD at [facility] ENDO (4TH FLR)    ESOPHAGOGASTRODUODENOSCOPY (EGD) N/A 2/22/2016    Performed by Efren Kemp MD at [facility] ENDO (4TH FLR)    EXPLORATION-BLEEDING (RE-EXPLORATION) Left 5/7/2015    Performed by Joana Hernandez MD at [facility] OR [address] [place] [address]    HERNIA REPAIR      HYSTERECTOMY  partial    1982 partial hysterectomy    left nasal polyp      left neck lymph node      nose polyp      PLACEMENT-STENT Right 1/5/2016    Performed by Joana Hernandez MD at [facility] OR [address] [place] [address]    REPAIR-HERNIA-LAPAROSCOPIC-INCISIONAL w/ mesh N/A 6/12/2017    Performed by Jay Milligan MD at [facility] OR [address] [place] [address]    right  hip fatty mass tissue      stent to small intestine      SUPERIOR VENA CAVA ANGIOPLASTY / STENTING      THROMBECTOMY - left brachial artery Left 5/5/2015    [facility]    TONSILLECTOMY      TOTAL ABDOMINAL HYSTERECTOMY      2014    TOTAL KNEE ARTHROPLASTY Bilateral     UPPER GASTROINTESTINAL ENDOSCOPY  2014     Family History   Problem Relation Age of Onset    Colon cancer Neg Hx     Inflammatory bowel disease Neg Hx      Social History     Tobacco Use    Smoking status: Never Smoker    Smokeless tobacco: Never Used   Substance Use Topics    Alcohol use: No    Drug use: No     Review of Systems   Constitutional: Negative for chills and fever.   HENT: Negative for congestion, rhinorrhea and sore throat.    Eyes: Negative for redness and visual disturbance.   Respiratory: Negative for cough, shortness of breath and wheezing.    Cardiovascular: Negative for chest pain and palpitations.   Gastrointestinal: Positive for abdominal pain, nausea and vomiting. Negative for diarrhea.   Genitourinary: Negative for dysuria and hematuria.   Musculoskeletal: Negative for back pain, myalgias and neck pain.   Skin: Negative for rash.   Neurological: Negative for dizziness, weakness and light-headedness.   Psychiatric/Behavioral: Negative for confusion.       Physical Exam     Initial Vitals [01/10/19 1440]   BP Pulse Resp Temp SpO2   (!) 210/84 88 (!) 22 97.6 °F (36.4 °C) 99 %      MAP       --         Physical Exam    Nursing note and vitals reviewed.  Constitutional: She appears well-developed and well-nourished. She is not diaphoretic. No distress.   HENT:   Head: Normocephalic and atraumatic.   Mouth/Throat: Oropharynx is clear and moist.   Eyes: Conjunctivae and EOM are normal. Pupils are equal, round, and reactive to light.   Neck: Normal range of motion. Neck supple.   Cardiovascular: Normal rate, regular rhythm and normal heart sounds. Exam reveals no gallop and no  friction rub.    No murmur heard.  Pulmonary/Chest: Breath sounds normal. She has no wheezes. She has no rhonchi. She has no rales.   Abdominal: Soft. Bowel sounds are normal. There is tenderness (generalized). There is no rebound and no guarding.   Musculoskeletal: Normal range of motion. She exhibits no edema or tenderness.   Lymphadenopathy:     She has no cervical adenopathy.   Neurological: She is alert and oriented to person, place, and time. She has normal strength.   Skin: Skin is warm and dry. Capillary refill takes less than 2 seconds. No rash noted.         ED Course   Procedures  Labs Reviewed   CBC W/ AUTO DIFFERENTIAL - Abnormal; Notable for the following components:       Result Value    MCH 31.1 (*)     All other components within normal limits   COMPREHENSIVE METABOLIC PANEL - Abnormal; Notable for the following components:    Sodium 135 (*)     Glucose 168 (*)     AST 43 (*)     ALT 45 (*)     All other components within normal limits   URINALYSIS, REFLEX TO URINE CULTURE - Abnormal; Notable for the following components:    Protein, UA 1+ (*)     Leukocytes, UA 1+ (*)     All other components within normal limits    Narrative:     Preferred Collection Type->Urine, Clean Catch   URINALYSIS MICROSCOPIC - Abnormal; Notable for the following components:    WBC, UA 50 (*)     Bacteria, UA Few (*)     All other components within normal limits    Narrative:     Preferred Collection Type->Urine, Clean Catch   CULTURE, URINE   LIPASE   TROPONIN I   TROPONIN I          Imaging Results    None          Medical Decision Making:   Clinical Tests:   Lab Tests: Ordered and Reviewed  Medical Tests: Ordered and Reviewed  ED Management:  4:51 PM Paged Dr. Hernandez of Vascular Surgery    5:38 PM Case discussed with physician on call for Dr. Hernandez who states, there is no need to do CTA today and the patient can follow up in clinic tomorrow if necessary.              Attending Attestation:           Physician Attestation  for Scribe:  Physician Attestation Statement for Scribe #1: I, sintia pandey, reviewed documentation, as scribed by beena barbour in my presence, and it is both accurate and complete.                 ED Course as of Markell 10 1810   Thu Markell 10, 2019   1438 Sort note: Adriana Strong nontoxic/afebrile 74 y.o.  presented to the ED with c/o abdominal pain with N and V. patient has a history of mesenteric ischemia with stent placement.  She does report some lower extremity weakness and dizziness this week.    Patient seen and medically screened by Physician assistant in Sort process due to ED crowding.  Appropriate tests and/or medications ordered.  Care transferred to an alternate provider when patient was placed in an Exam Room from the [facility] for physical exam, additional orders, and disposition. 2:38 PM. LC    [LC]      ED Course User Index  [LC] JUSTUS Sousa     Clinical Impression:     1. Generalized abdominal pain    2. Weakness      Disposition:   Disposition: Discharged  Condition: Stable                        Sintia Pandey MD  01/10/19 1810

## 2019-01-10 NOTE — ED NOTES
Pt complaining of constant pain all over her abdomen for a couple of weeks. It has gotten worse which brought her into the ER. Pt had a bowel movement today at 1000 described as soft.

## 2019-01-12 LAB — BACTERIA UR CULT: NORMAL

## 2019-01-14 ENCOUNTER — HOSPITAL ENCOUNTER (OUTPATIENT)
Dept: VASCULAR SURGERY | Facility: CLINIC | Age: 74
Discharge: HOME | End: 2019-01-14
Attending: SURGERY
Payer: MEDICARE

## 2019-01-14 ENCOUNTER — OFFICE VISIT (OUTPATIENT)
Dept: VASCULAR SURGERY | Facility: CLINIC | Age: 74
End: 2019-01-14
Payer: MEDICARE

## 2019-01-14 VITALS
SYSTOLIC BLOOD PRESSURE: 173 MMHG | TEMPERATURE: 98 F | HEART RATE: 85 BPM | DIASTOLIC BLOOD PRESSURE: 77 MMHG | HEIGHT: 64 IN | WEIGHT: 222.69 LBS | BODY MASS INDEX: 38.02 KG/M2

## 2019-01-14 DIAGNOSIS — R10.9 ABDOMINAL PAIN: ICD-10-CM

## 2019-01-14 DIAGNOSIS — K55.1 CHRONIC MESENTERIC ISCHEMIA: ICD-10-CM

## 2019-01-14 DIAGNOSIS — K55.1 CHRONIC MESENTERIC ISCHEMIA: Primary | ICD-10-CM

## 2019-01-14 PROCEDURE — 93975 PR DUPLEX ABD/PEL VASC STUDY,COMPLETE: ICD-10-PCS | Mod: S$GLB,,, | Performed by: SURGERY

## 2019-01-14 PROCEDURE — 1101F PT FALLS ASSESS-DOCD LE1/YR: CPT | Mod: CPTII,S$GLB,, | Performed by: SURGERY

## 2019-01-14 PROCEDURE — 99999 PR PBB SHADOW E&M-EST. PATIENT-LVL III: ICD-10-PCS | Mod: PBBFAC,,, | Performed by: SURGERY

## 2019-01-14 PROCEDURE — 3078F DIAST BP <80 MM HG: CPT | Mod: CPTII,S$GLB,, | Performed by: SURGERY

## 2019-01-14 PROCEDURE — 3077F PR MOST RECENT SYSTOLIC BLOOD PRESSURE >= 140 MM HG: ICD-10-PCS | Mod: CPTII,S$GLB,, | Performed by: SURGERY

## 2019-01-14 PROCEDURE — 3078F PR MOST RECENT DIASTOLIC BLOOD PRESSURE < 80 MM HG: ICD-10-PCS | Mod: CPTII,S$GLB,, | Performed by: SURGERY

## 2019-01-14 PROCEDURE — 1101F PR PT FALLS ASSESS DOC 0-1 FALLS W/OUT INJ PAST YR: ICD-10-PCS | Mod: CPTII,S$GLB,, | Performed by: SURGERY

## 2019-01-14 PROCEDURE — 99213 OFFICE O/P EST LOW 20 MIN: CPT | Mod: S$GLB,,, | Performed by: SURGERY

## 2019-01-14 PROCEDURE — 99999 PR PBB SHADOW E&M-EST. PATIENT-LVL III: CPT | Mod: PBBFAC,,, | Performed by: SURGERY

## 2019-01-14 PROCEDURE — 99213 PR OFFICE/OUTPT VISIT, EST, LEVL III, 20-29 MIN: ICD-10-PCS | Mod: S$GLB,,, | Performed by: SURGERY

## 2019-01-14 PROCEDURE — 93975 VASCULAR STUDY: CPT | Mod: S$GLB,,, | Performed by: SURGERY

## 2019-01-14 PROCEDURE — 3077F SYST BP >= 140 MM HG: CPT | Mod: CPTII,S$GLB,, | Performed by: SURGERY

## 2019-01-14 RX ORDER — PREDNISONE 10 MG/1
TABLET ORAL
Refills: 0 | COMMUNITY
Start: 2018-12-28 | End: 2019-01-24

## 2019-01-14 RX ORDER — ZAFIRLUKAST 20 MG/1
20 TABLET, FILM COATED ORAL DAILY
Refills: 3 | COMMUNITY
Start: 2018-12-18 | End: 2021-03-23

## 2019-01-14 RX ORDER — CLOPIDOGREL BISULFATE 75 MG/1
75 TABLET ORAL DAILY
Qty: 90 TABLET | Refills: 4 | Status: ON HOLD | OUTPATIENT
Start: 2019-01-14 | End: 2019-11-05 | Stop reason: HOSPADM

## 2019-01-14 RX ORDER — ONDANSETRON 4 MG/1
4 TABLET, ORALLY DISINTEGRATING ORAL EVERY 8 HOURS PRN
Refills: 1 | Status: ON HOLD | COMMUNITY
Start: 2018-10-15 | End: 2022-08-12 | Stop reason: SDUPTHER

## 2019-01-14 NOTE — PROGRESS NOTES
Patient ID: Adriana Strong is a 74 y.o. female.    I. HISTORY     Chief Complaint: Follow-up      HPI Adriana Strong is a 74 y.o. female chronic mesenteric ischemia, occluded SMA, treated with LIZ stenting via the left brachial artery 2015. Repeat intervention in 2016.    Since last visit, patient has stable symptoms.  She has been seeing Dr. Kemp with recent EGD for workup of her abdominal pain with was within normal limits, celiac work up was negative. She has long history of constipation which is being treated with stool softeners, miralax, fiber supplement. On protonix.. No nausea or vomiting. Occasional abdominal pain following meals but can also go days pain free.      On anticoag for AF. Incisional hernia repair last summer by Dr Milligan    Review of Systems   Constitution: Negative for decreased appetite, fever, weakness, weight gain and weight loss.   Eyes: Negative for blurred vision.   Cardiovascular: Negative for chest pain.   Respiratory: Negative for cough and shortness of breath.    Gastrointestinal: Positive for abdominal pain and heartburn. Negative for change in bowel habit, dysphagia and nausea.       II. PHYSICAL EXAM     Right Arm BP - Sittin/77 (19 1046)  Left Arm BP - Sittin/83 (19 1046)  Pulse: 85  Temp: 97.7 °F (36.5 °C)  Body mass index is 38.22 kg/m².    Physical Exam   Constitutional: She is oriented to person, place, and time. She appears well-developed and well-nourished.   Cardiovascular: Intact distal pulses.   Pulses:       Radial pulses are 2+ on the right side, and 2+ on the left side.        Dorsalis pedis pulses are 2+ on the right side, and 2+ on the left side.   Pulmonary/Chest: Effort normal. No respiratory distress.   Abdominal: Soft. There is tenderness.   Musculoskeletal: Normal range of motion. She exhibits edema.   Neurological: She is alert and oriented to person, place, and time.   Skin: Skin is warm and dry.   Psychiatric: She has a  normal mood and affect.     Mesenteric duplex - No LIZ or Celiac stenosis, SMA occluded    III. ASSESSMENT & PLAN (MEDICAL DECISION MAKING)     1. Chronic mesenteric ischemia         Assessment/Diagnosis and Plan:  Adriana Strong is a 74 y.o. female with chronic mesenetric ischemia, occluded SMA and stenting of LIZ,.  Symptoms and weight are stable since last year. Inconsistent symtpoms with meals makes mesenteric ischemia less likely.    Follow up 1 year with mesenteric duplex    Joana Hernandez MD FACS [facility]  Vascular & Endovascular Surgery

## 2019-01-14 NOTE — ED PROVIDER NOTES
Urine culture shows infection. Augmentin called to [facility] in [facility]. Pt notified of need to start abx.      Kimberly Briscoe, KHRIS  01/13/19 5622

## 2019-01-16 DIAGNOSIS — R79.89 ELEVATED LFTS: Primary | ICD-10-CM

## 2019-01-17 ENCOUNTER — TELEPHONE (OUTPATIENT)
Dept: CARDIOLOGY | Facility: CLINIC | Age: 74
End: 2019-01-17

## 2019-01-17 ENCOUNTER — TELEPHONE (OUTPATIENT)
Dept: UROLOGY | Facility: CLINIC | Age: 74
End: 2019-01-17

## 2019-01-17 DIAGNOSIS — R79.89 ELEVATED LFTS: Primary | ICD-10-CM

## 2019-01-17 NOTE — TELEPHONE ENCOUNTER
----- Message from Tina Farris sent at 1/17/2019 11:44 AM CST -----  Contact: [phone]  Patient called in returning your call. Please advise

## 2019-01-24 ENCOUNTER — OFFICE VISIT (OUTPATIENT)
Dept: CARDIOLOGY | Facility: CLINIC | Age: 74
End: 2019-01-24
Payer: MEDICARE

## 2019-01-24 VITALS
BODY MASS INDEX: 38.09 KG/M2 | HEART RATE: 79 BPM | DIASTOLIC BLOOD PRESSURE: 78 MMHG | HEIGHT: 64 IN | WEIGHT: 223.13 LBS | SYSTOLIC BLOOD PRESSURE: 126 MMHG

## 2019-01-24 DIAGNOSIS — K55.1 MESENTERIC ARTERY INSUFFICIENCY: ICD-10-CM

## 2019-01-24 DIAGNOSIS — I48.20 CHRONIC ATRIAL FIBRILLATION: Primary | ICD-10-CM

## 2019-01-24 DIAGNOSIS — Z86.73 OLD LACUNAR STROKE WITHOUT LATE EFFECT: ICD-10-CM

## 2019-01-24 DIAGNOSIS — I35.0 MODERATE AORTIC STENOSIS: ICD-10-CM

## 2019-01-24 DIAGNOSIS — J44.1 COPD EXACERBATION: ICD-10-CM

## 2019-01-24 DIAGNOSIS — J45.20 MILD INTERMITTENT ASTHMA WITHOUT COMPLICATION: ICD-10-CM

## 2019-01-24 DIAGNOSIS — E78.00 PURE HYPERCHOLESTEROLEMIA: ICD-10-CM

## 2019-01-24 DIAGNOSIS — Z79.01 CHRONIC ANTICOAGULATION: ICD-10-CM

## 2019-01-24 DIAGNOSIS — I10 ESSENTIAL HYPERTENSION: ICD-10-CM

## 2019-01-24 DIAGNOSIS — I48.20 ATRIAL FIBRILLATION, CHRONIC: ICD-10-CM

## 2019-01-24 PROCEDURE — 99999 PR PBB SHADOW E&M-EST. PATIENT-LVL II: ICD-10-PCS | Mod: PBBFAC,,, | Performed by: INTERNAL MEDICINE

## 2019-01-24 PROCEDURE — 1101F PT FALLS ASSESS-DOCD LE1/YR: CPT | Mod: CPTII,S$GLB,, | Performed by: INTERNAL MEDICINE

## 2019-01-24 PROCEDURE — 99214 PR OFFICE/OUTPT VISIT, EST, LEVL IV, 30-39 MIN: ICD-10-PCS | Mod: S$GLB,,, | Performed by: INTERNAL MEDICINE

## 2019-01-24 PROCEDURE — 93000 EKG 12-LEAD: ICD-10-PCS | Mod: S$GLB,,, | Performed by: INTERNAL MEDICINE

## 2019-01-24 PROCEDURE — 99499 UNLISTED E&M SERVICE: CPT | Mod: S$GLB,,, | Performed by: INTERNAL MEDICINE

## 2019-01-24 PROCEDURE — 3078F PR MOST RECENT DIASTOLIC BLOOD PRESSURE < 80 MM HG: ICD-10-PCS | Mod: CPTII,S$GLB,, | Performed by: INTERNAL MEDICINE

## 2019-01-24 PROCEDURE — 99999 PR PBB SHADOW E&M-EST. PATIENT-LVL II: CPT | Mod: PBBFAC,,, | Performed by: INTERNAL MEDICINE

## 2019-01-24 PROCEDURE — 1101F PR PT FALLS ASSESS DOC 0-1 FALLS W/OUT INJ PAST YR: ICD-10-PCS | Mod: CPTII,S$GLB,, | Performed by: INTERNAL MEDICINE

## 2019-01-24 PROCEDURE — 99214 OFFICE O/P EST MOD 30 MIN: CPT | Mod: S$GLB,,, | Performed by: INTERNAL MEDICINE

## 2019-01-24 PROCEDURE — 3074F SYST BP LT 130 MM HG: CPT | Mod: CPTII,S$GLB,, | Performed by: INTERNAL MEDICINE

## 2019-01-24 PROCEDURE — 93000 ELECTROCARDIOGRAM COMPLETE: CPT | Mod: S$GLB,,, | Performed by: INTERNAL MEDICINE

## 2019-01-24 PROCEDURE — 3074F PR MOST RECENT SYSTOLIC BLOOD PRESSURE < 130 MM HG: ICD-10-PCS | Mod: CPTII,S$GLB,, | Performed by: INTERNAL MEDICINE

## 2019-01-24 PROCEDURE — 3078F DIAST BP <80 MM HG: CPT | Mod: CPTII,S$GLB,, | Performed by: INTERNAL MEDICINE

## 2019-01-24 PROCEDURE — 99499 RISK ADDL DX/OHS AUDIT: ICD-10-PCS | Mod: S$GLB,,, | Performed by: INTERNAL MEDICINE

## 2019-01-24 RX ORDER — LISINOPRIL 20 MG/1
20 TABLET ORAL DAILY
Qty: 90 TABLET | Refills: 3 | Status: SHIPPED | OUTPATIENT
Start: 2019-01-24 | End: 2019-09-05 | Stop reason: DRUGHIGH

## 2019-01-24 RX ORDER — DILTIAZEM HYDROCHLORIDE 180 MG/1
180 CAPSULE, COATED, EXTENDED RELEASE ORAL DAILY
COMMUNITY
End: 2019-01-24 | Stop reason: SDUPTHER

## 2019-01-24 RX ORDER — DILTIAZEM HYDROCHLORIDE 180 MG/1
180 CAPSULE, COATED, EXTENDED RELEASE ORAL DAILY
Qty: 90 CAPSULE | Refills: 3 | Status: SHIPPED | OUTPATIENT
Start: 2019-01-24 | End: 2019-08-16

## 2019-01-24 RX ORDER — FUROSEMIDE 40 MG/1
40 TABLET ORAL DAILY
Qty: 90 TABLET | Refills: 3 | Status: SHIPPED | OUTPATIENT
Start: 2019-01-24 | End: 2020-01-14

## 2019-01-24 NOTE — PROGRESS NOTES
Patient, Adriana Strong (MRN #[id number]), presented with a recorded BMI of 38.3 kg/m^2 and a documented comorbidity(s):  - Hypertension  - Hyperlipidemia  - Atrial Fibrillation  to which the severe obesity is a contributing factor. This is consistent with the definition of severe obesity (BMI 35.0-39.9) with comorbidity (ICD-10 E66.01, Z68.35). The patient's severe obesity was monitored, evaluated, addressed and/or treated. This addendum to the medical record is made on 01/24/2019.

## 2019-01-24 NOTE — PROGRESS NOTES
"  Subjective:      Patient ID: Adriana Strong is a 74 y.o. female.    Chief Complaint: Follow-up (AFib)    HPI:  Pt c/o persistent stomach pains.    Pt had evaluation at the main campus.  Pt was found not to require another mesenteric stent.    Pt c/o periumbilical pain almost every day.    Pt went to [facility] with abdominal pain and had lab.    Pain management doctor has prescribed gabapentin.    "When I get the stomach pain I get extremely weak and nauseated and I have chest pain."  "Sometimes my legs feel like they are going to give out."    "Sometimes I wake up with chest tightness."    "I have a fast heart beat when I walk."  Review of Systems   Cardiovascular: Positive for chest pain, dyspnea on exertion (chronic, when walking) and palpitations. Negative for claudication, irregular heartbeat, leg swelling, near-syncope, orthopnea and syncope.      Pt had labs with Dr Go  HgbA1C 6.9, AST 38, ALT 40.  Pt has a hx of fatty liver.  Rest of CMP OK except glu 124.    "My blood pressure is high when I have pain."    The abdominal pain most often occurs before, during, or after a bowel movement.  Past Medical History:   Diagnosis Date    Anticoagulant long-term use     on Plavix since May 2015    Anxiety     Arthritis     Asthma     Atrial fibrillation     Cataracts, bilateral     Chest pain, atypical     Colon polyp     Coronary artery disease     Diabetes mellitus     Dry eyes     Esophageal erosions     GERD (gastroesophageal reflux disease)     Heart murmur     Hemorrhoid     High cholesterol     Hypertension     Irritable bowel syndrome     Shingles 2015    TIA (transient ischemic attack)     Use of cane as ambulatory aid         Past Surgical History:   Procedure Laterality Date    ABDOMINAL SURGERY      angiocele      2007, 2014    ANGIOGRAM-ABDOMINAL N/A 5/5/2015    Performed by [facility]ana Hernandez [facility] at [facility] OR [address] [place] [address]    ANGIOGRAM-ABDOMINAL SMA stent Left Brachial access. Right arm over " head. Left 4/29/2015    Performed by [facility] MD at [facility] OR [address] [place] [address]    ANGIOGRAM-ABDOMINAL-INTESTINAL - Mesenteric angiogram Right CFA access, possible left arm access  Right 1/5/2016    Performed by [facility] Hernandez MD at [facility] OR [address] [place] [address]    ANGIOGRAM-MESENTERIC  1/5/2016    Performed by Jo[facility] Hernandez MD at [facility] OR [address] [place] [address]    BREAST SURGERY      left--- a lump--- no cancer    COLONOSCOPY  2014    COLONOSCOPY N/A 3/14/2018    Performed by Efren Kemp MD at [facility] ENDO (4TH FLR)    ESOPHAGOGASTRODUODENOSCOPY (EGD) N/A 2/22/2016    Performed by Efren Kemp MD at [facility] ENDO (4TH FLR)    EXPLORATION-BLEEDING (RE-EXPLORATION) Left 5/7/2015    Performed by Joana Hernandez MD at [facility] OR [address] [place] [address]    HERNIA REPAIR      HYSTERECTOMY  partial    1982 partial hysterectomy    left nasal polyp      left neck lymph node      nose polyp      PLACEMENT-STENT Right 1/5/2016    Performed by Joana Hernandez MD at [facility] OR [address] [place] [address]    REPAIR-HERNIA-LAPAROSCOPIC-INCISIONAL w/ mesh N/A 6/12/2017    Performed by Jay Milligan MD at [facility] OR [address] [place] [address]    right hip fatty mass tissue      stent to small intestine      SUPERIOR VENA CAVA ANGIOPLASTY / STENTING      THROMBECTOMY - left brachial artery Left 5/5/2015    Performed by Joana Hernandez MD at [facility] OR [address] [place] [address]    TONSILLECTOMY      TOTAL ABDOMINAL HYSTERECTOMY      2014    TOTAL KNEE ARTHROPLASTY Bilateral     UPPER GASTROINTESTINAL ENDOSCOPY  2014       Family History   Problem Relation Age of Onset    Colon cancer Neg Hx     Inflammatory bowel disease Neg Hx        Social History     Socioeconomic History    Marital status:      Spouse name: Not on file    Number of children: Not on file    Years of education: Not on file    Highest education level: Not on file   Social Needs    Financial resource strain: Not on file    Food insecurity - worry: Not on file    Food insecurity - inability: Not on file    Transportation needs - medical:  Not on file    Transportation needs - non-medical: Not on file   Occupational History    Not on file   Tobacco Use    Smoking status: Never Smoker    Smokeless tobacco: Never Used   Substance and Sexual Activity    Alcohol use: No    Drug use: No    Sexual activity: Not on file   Other Topics Concern    Not on file   Social History Narrative    Not on file       Current Outpatient Medications on File Prior to Visit   Medication Sig Dispense Refill    acetaminophen (TYLENOL ARTHRITIS) 650 MG TbSR Take 1,300 mg by mouth 2 (two) times daily.      albuterol (ACCUNEB) 1.25 mg/3 mL Nebu Take scheduled q4 for the next two days while at home then resume prn dosing 3 mL 1    albuterol (VENTOLIN HFA) 90 mcg/actuation inhaler Inhale 2 puffs into the lungs every 6 (six) hours as needed for Wheezing. Rescue      apixaban 5 mg Tab Take 1 tablet (5 mg total) by mouth 2 (two) times daily. 180 tablet 3    ascorbic acid (VITAMIN C) 500 MG tablet Take 500 mg by mouth once daily.      azelastine (ASTELIN) 137 mcg nasal spray 1 spray by Nasal route 2 (two) times daily.      budesonide-formoterol 160-4.5 mcg (SYMBICORT) 160-4.5 mcg/actuation HFAA Inhale 2 puffs into the lungs every 12 (twelve) hours. Controller      calcium carbonate (OS-MICHAEL) 600 mg (1,500 mg) Tab Take 1,200 mg by mouth once daily.      clopidogrel (PLAVIX) 75 mg tablet Take 1 tablet (75 mg total) by mouth once daily. 90 tablet 4    cyanocobalamin (VITAMIN B-12) 1000 MCG tablet Take 100 mcg by mouth once daily.      digoxin (LANOXIN) 125 mcg tablet Take 1 tablet (125 mcg total) by mouth once daily. 90 tablet 3    diltiaZEM (CARDIZEM CD) 180 MG 24 hr capsule Take 180 mg by mouth once daily.      docusate sodium (COLACE) 100 MG capsule Take 1 capsule (100 mg total) by mouth 2 (two) times daily. 60 capsule 1    fexofenadine (ALLEGRA) 60 MG tablet Take 60 mg by mouth every morning.      fish oil-omega-3 fatty acids 300-1,000 mg capsule Take 1 g by  mouth once daily.       fluticasone (FLONASE) 50 mcg/actuation nasal spray 1 spray by Each Nare route every morning.       furosemide (LASIX) 40 MG tablet Take 1 tablet (40 mg total) by mouth once daily. 90 tablet 3    gabapentin (NEURONTIN) 300 MG capsule Take 1 capsule (300 mg total) by mouth 3 (three) times daily. 90 capsule 11    lisinopril (PRINIVIL,ZESTRIL) 20 MG tablet Take 1 tablet (20 mg total) by mouth once daily. 90 tablet 3    magnesium 250 mg Tab Take 250 mg by mouth. 3 in am and 3 pm      ondansetron (ZOFRAN-ODT) 4 MG TbDL DISSOLVE 1 TABLET ON TONGUE EVERY 8 HOURS AS NEEDED FOR NAUSEA  1    pantoprazole (PROTONIX) 40 MG tablet Take 1 tablet (40 mg total) by mouth once daily. (Patient taking differently: Take 40 mg by mouth every morning. ) 90 tablet 3    polyethylene glycol (GLYCOLAX) 17 gram PwPk Take 17 g by mouth once daily.      POLYSORBATE 80/GLYCERIN (REFRESH DRY EYE THERAPY OPHT) Apply to eye 2 (two) times daily.      potassium gluconate 550 mg (90 mg) Tab Take by mouth. 2 in am and 2 pm      psyllium (METAMUCIL) powder Take 1 packet by mouth 3 (three) times daily.      traMADol (ULTRAM) 50 mg tablet Take 1 tablet (50 mg total) by mouth every 6 (six) hours as needed for Pain. 20 tablet 0    zafirlukast (ACCOLATE) 20 MG tablet Take 20 mg by mouth once daily.  3    lorazepam (ATIVAN) 0.5 MG tablet Take 0.5 mg by mouth every morning.       oxybutynin (DITROPAN-XL) 5 MG TR24 Take 5 mg by mouth every evening.       [DISCONTINUED] diltiaZEM (CARDIZEM SR) 60 MG Cp12 Take 3 capsules (180 mg total) by mouth 2 (two) times daily. 540 capsule 3    [DISCONTINUED] predniSONE (DELTASONE) 10 MG tablet PLEASE SEE ATTACHED FOR DETAILED DIRECTIONS  0    [DISCONTINUED] traMADol (ULTRAM) 50 mg tablet Take 1 tablet (50 mg total) by mouth every 6 (six) hours as needed for Pain. 90 tablet 3     No current facility-administered medications on file prior to visit.        Review of patient's allergies  "indicates:   Allergen Reactions    Celebrex [celecoxib] Shortness Of Breath    Ciprofloxacin Swelling     lip    Dicyclomine Hives    Adhesive Dermatitis    Avelox [moxifloxacin] Swelling    Cilostazol Other (See Comments)     Elevates blood pressure    Eryc [erythromycin] Other (See Comments)     unknown    Iodine and iodide containing products Hives    Keflex [cephalexin] Hives    Meclomen      rashes    Meloxicam      Ear ringing    Metoclopramide Other (See Comments)     High blood pressure    Sulfa (sulfonamide antibiotics) Itching     Objective:     Vitals:    01/24/19 1305 01/24/19 1341   BP: (!) 167/74 126/78   BP Location: Left arm Left arm   Patient Position: Sitting Sitting   BP Method: Large (Automatic)    Pulse: 79    Weight: 101.2 kg (223 lb 1.6 oz)    Height: 5' 4" (1.626 m)         Physical Exam   Constitutional: She is oriented to person, place, and time. She appears well-developed and well-nourished.   Eyes: No scleral icterus.   Neck: No JVD present. Carotid bruit is not present.   Cardiovascular: Normal rate. An irregularly irregular rhythm present. Exam reveals no gallop.   Murmur (III/VI systolic) heard.  Pulmonary/Chest: Breath sounds normal.   Musculoskeletal: She exhibits no edema.   Neurological: She is alert and oriented to person, place, and time.   Skin: Skin is warm and dry.   Psychiatric: She has a normal mood and affect. Her behavior is normal. Judgment and thought content normal.   Vitals reviewed.     Recent note from vascular surgeon reviewed    ECG: atrial fibrillation with controlled ventricular response, otherwise OK    Note stress echo last year was normal;    Note last echo showed moderate AS  Assessment:     1. Chronic atrial fibrillation    2. Moderate aortic stenosis    3. Pure hypercholesterolemia    4. Essential hypertension    5. COPD exacerbation    6. Mild intermittent asthma without complication    7. Old lacunar stroke without late effect    8. Chronic " anticoagulation    9. Mesenteric artery insufficiency    10. Atrial fibrillation, chronic      Plan:   Adriana was seen today for follow-up.    Diagnoses and all orders for this visit:    Chronic atrial fibrillation  -     IN OFFICE EKG 12-LEAD (to Muse)  -     Holter monitor - 24 hour (Cupid Only); Future    Moderate aortic stenosis  -     Transthoracic echo (TTE) complete (Cupid Only); Future    Pure hypercholesterolemia    Essential hypertension    COPD exacerbation    Mild intermittent asthma without complication    Old lacunar stroke without late effect    Chronic anticoagulation    Mesenteric artery insufficiency    Atrial fibrillation, chronic  -     IN OFFICE EKG 12-LEAD (to Muse)       F/u with Dr Isis Kemp, gastroenterologist.    Repeat holter    Repeat echocardiogram    F/u with Jamal Go and Mariluz    F/u with pain management    Follow-up in about 6 months (around 7/24/2019).     Low carb diet    I reviewed with pt that gabapentin, tramadol, lorazepam, Astelin, and Allegra could make her feel weak.    Agree with consultation to neurologist to evaluate weak legs.

## 2019-01-25 ENCOUNTER — HOSPITAL ENCOUNTER (OUTPATIENT)
Dept: RADIOLOGY | Facility: HOSPITAL | Age: 74
Discharge: HOME | End: 2019-01-25
Attending: FAMILY MEDICINE
Payer: MEDICARE

## 2019-01-25 DIAGNOSIS — R79.89 ELEVATED LFTS: ICD-10-CM

## 2019-01-25 PROCEDURE — 76700 US EXAM ABDOM COMPLETE: CPT | Mod: TC,PO

## 2019-01-29 ENCOUNTER — HOSPITAL ENCOUNTER (OUTPATIENT)
Dept: CARDIOLOGY | Facility: HOSPITAL | Age: 74
Discharge: HOME | End: 2019-01-29
Attending: INTERNAL MEDICINE
Payer: MEDICARE

## 2019-01-29 DIAGNOSIS — I35.0 MODERATE AORTIC STENOSIS: ICD-10-CM

## 2019-01-29 DIAGNOSIS — I48.20 CHRONIC ATRIAL FIBRILLATION: ICD-10-CM

## 2019-01-29 LAB
AORTIC ROOT ANNULUS: 2.36 CM
AORTIC VALVE CUSP SEPERATION: 0.56 CM
AV INDEX (PROSTH): 0.25
AV MEAN GRADIENT: 33.59 MMHG
AV PEAK GRADIENT: 47.06 MMHG
AV VALVE AREA: 0.78 CM2
AV VELOCITY RATIO: 0.27
CV ECHO LV RWT: 0.56 CM
DOP CALC AO PEAK VEL: 3.43 M/S
DOP CALC AO VTI: 81.13 CM
DOP CALC LVOT AREA: 3.17 CM2
DOP CALC LVOT DIAMETER: 2.01 CM
DOP CALC LVOT PEAK VEL: 0.91 M/S
DOP CALC LVOT STROKE VOLUME: 63.43 CM3
DOP CALC MV VTI: 31 CM
DOP CALCLVOT PEAK VEL VTI: 20 CM
E WAVE DECELERATION TIME: 181.97 MSEC
E/A RATIO: 1.91
E/E' RATIO: 15.06
ECHO LV POSTERIOR WALL: 1.24 CM (ref 0.6–1.1)
FRACTIONAL SHORTENING: 16 % (ref 28–44)
INTERVENTRICULAR SEPTUM: 1.21 CM (ref 0.6–1.1)
IVC PROX: 2.1 CM
IVRT: 0.04 MSEC
LA MAJOR: 5.76 CM
LA MINOR: 5.51 CM
LA WIDTH: 4.4 CM
LEFT ATRIUM SIZE: 4.34 CM
LEFT ATRIUM VOLUME: 91.42 CM3
LEFT INTERNAL DIMENSION IN SYSTOLE: 3.67 CM (ref 2.1–4)
LEFT VENTRICLE DIASTOLIC VOLUME: 87.1 ML
LEFT VENTRICLE SYSTOLIC VOLUME: 56.85 ML
LEFT VENTRICULAR INTERNAL DIMENSION IN DIASTOLE: 4.39 CM (ref 3.5–6)
LEFT VENTRICULAR MASS: 196.46 G
LV LATERAL E/E' RATIO: 12.8
LV SEPTAL E/E' RATIO: 18.29
MV PEAK A VEL: 0.67 M/S
MV PEAK E VEL: 1.28 M/S
MV STENOSIS PRESSURE HALF TIME: 53 MS
MV VALVE AREA BY CONTINUITY EQUATION: 2.05 CM2
MV VALVE AREA P 1/2 METHOD: 4.15 CM2
PISA TR MAX VEL: 3.45 M/S
PULM VEIN S/D RATIO: 0.34
PV PEAK D VEL: 1.01 M/S
PV PEAK S VEL: 0.34 M/S
PV PEAK VELOCITY: 0.92 CM/S
RA MAJOR: 5.62 CM
RA WIDTH: 4.03 CM
RIGHT VENTRICULAR END-DIASTOLIC DIMENSION: 2.65 CM
SINUS: 2.43 CM
TDI LATERAL: 0.1
TDI SEPTAL: 0.07
TDI: 0.09
TR MAX PG: 47.61 MMHG
TRICUSPID ANNULAR PLANE SYSTOLIC EXCURSION: 1.52 CM

## 2019-01-29 PROCEDURE — 93227 XTRNL ECG REC<48 HR R&I: CPT | Mod: ,,, | Performed by: INTERNAL MEDICINE

## 2019-01-29 PROCEDURE — 93306 TRANSTHORACIC ECHO (TTE) COMPLETE (CUPID ONLY): ICD-10-PCS | Mod: 26,,, | Performed by: INTERNAL MEDICINE

## 2019-01-29 PROCEDURE — 93306 TTE W/DOPPLER COMPLETE: CPT | Mod: 26,,, | Performed by: INTERNAL MEDICINE

## 2019-01-29 PROCEDURE — 93227 HOLTER MONITOR - 24 HOUR (CUPID ONLY): ICD-10-PCS | Mod: ,,, | Performed by: INTERNAL MEDICINE

## 2019-01-29 PROCEDURE — 93306 TTE W/DOPPLER COMPLETE: CPT | Mod: PO

## 2019-01-29 PROCEDURE — 93225 XTRNL ECG REC<48 HRS REC: CPT | Mod: PO

## 2019-01-30 ENCOUNTER — TELEPHONE (OUTPATIENT)
Dept: CARDIOLOGY | Facility: CLINIC | Age: 74
End: 2019-01-30

## 2019-01-30 LAB
OHS CV EVENT MONITOR DAY: 1
OHS CV HOLTER LENGTH DECIMAL HOURS: 48
OHS CV HOLTER LENGTH HOURS: 24
OHS CV HOLTER LENGTH MINUTES: 0

## 2019-01-30 NOTE — TELEPHONE ENCOUNTER
Echocardiogram shows moderate to severe aortic stenosis and normal LVEF with no significant change compared with echo done last year.    Holter shows atrial fibrillation with an adequately controlled ventricular response.    Pt reassured

## 2019-01-31 ENCOUNTER — PATIENT MESSAGE (OUTPATIENT)
Dept: VASCULAR SURGERY | Facility: CLINIC | Age: 74
End: 2019-01-31

## 2019-02-01 ENCOUNTER — HOSPITAL ENCOUNTER (EMERGENCY)
Facility: HOSPITAL | Age: 74
Discharge: HOME | End: 2019-02-01
Attending: EMERGENCY MEDICINE
Payer: MEDICARE

## 2019-02-01 VITALS
DIASTOLIC BLOOD PRESSURE: 67 MMHG | SYSTOLIC BLOOD PRESSURE: 172 MMHG | HEART RATE: 86 BPM | HEIGHT: 64 IN | WEIGHT: 223 LBS | TEMPERATURE: 98 F | BODY MASS INDEX: 38.07 KG/M2 | RESPIRATION RATE: 18 BRPM | OXYGEN SATURATION: 98 %

## 2019-02-01 DIAGNOSIS — R07.89 CHEST HEAVINESS: ICD-10-CM

## 2019-02-01 DIAGNOSIS — R07.9 CHEST PAIN: ICD-10-CM

## 2019-02-01 DIAGNOSIS — G62.9 PERIPHERAL POLYNEUROPATHY: Primary | ICD-10-CM

## 2019-02-01 DIAGNOSIS — R20.2 NUMBNESS AND TINGLING OF BOTH LOWER EXTREMITIES: ICD-10-CM

## 2019-02-01 DIAGNOSIS — R20.0 NUMBNESS AND TINGLING OF BOTH LOWER EXTREMITIES: ICD-10-CM

## 2019-02-01 LAB
ALBUMIN SERPL BCP-MCNC: 3.6 G/DL
ALP SERPL-CCNC: 47 U/L
ALT SERPL W/O P-5'-P-CCNC: 35 U/L
ANION GAP SERPL CALC-SCNC: 13 MMOL/L
AST SERPL-CCNC: 34 U/L
BASOPHILS # BLD AUTO: 0.03 K/UL
BASOPHILS NFR BLD: 0.3 %
BILIRUB SERPL-MCNC: 0.4 MG/DL
BUN SERPL-MCNC: 13 MG/DL
CALCIUM SERPL-MCNC: 9.4 MG/DL
CHLORIDE SERPL-SCNC: 102 MMOL/L
CO2 SERPL-SCNC: 24 MMOL/L
CREAT SERPL-MCNC: 0.8 MG/DL
DIFFERENTIAL METHOD: ABNORMAL
EOSINOPHIL # BLD AUTO: 0 K/UL
EOSINOPHIL NFR BLD: 0.3 %
ERYTHROCYTE [DISTWIDTH] IN BLOOD BY AUTOMATED COUNT: 14.6 %
EST. GFR  (AFRICAN AMERICAN): >60 ML/MIN/1.73 M^2
EST. GFR  (NON AFRICAN AMERICAN): >60 ML/MIN/1.73 M^2
GLUCOSE SERPL-MCNC: 111 MG/DL
HCT VFR BLD AUTO: 43.1 %
HGB BLD-MCNC: 13.9 G/DL
LYMPHOCYTES # BLD AUTO: 2.8 K/UL
LYMPHOCYTES NFR BLD: 29.9 %
MCH RBC QN AUTO: 30.8 PG
MCHC RBC AUTO-ENTMCNC: 32.3 G/DL
MCV RBC AUTO: 95 FL
MONOCYTES # BLD AUTO: 1 K/UL
MONOCYTES NFR BLD: 10.7 %
NEUTROPHILS # BLD AUTO: 5.5 K/UL
NEUTROPHILS NFR BLD: 58.4 %
PLATELET # BLD AUTO: 265 K/UL
PMV BLD AUTO: 10 FL
POTASSIUM SERPL-SCNC: 4.3 MMOL/L
PROT SERPL-MCNC: 7.1 G/DL
RBC # BLD AUTO: 4.52 M/UL
SODIUM SERPL-SCNC: 139 MMOL/L
TROPONIN I SERPL DL<=0.01 NG/ML-MCNC: 0.01 NG/ML
WBC # BLD AUTO: 9.47 K/UL

## 2019-02-01 PROCEDURE — 99285 EMERGENCY DEPT VISIT HI MDM: CPT | Mod: 25

## 2019-02-01 PROCEDURE — 93010 EKG 12-LEAD: ICD-10-PCS | Mod: ,,, | Performed by: INTERNAL MEDICINE

## 2019-02-01 PROCEDURE — 85025 COMPLETE CBC W/AUTO DIFF WBC: CPT

## 2019-02-01 PROCEDURE — 84484 ASSAY OF TROPONIN QUANT: CPT

## 2019-02-01 PROCEDURE — 80053 COMPREHEN METABOLIC PANEL: CPT

## 2019-02-01 PROCEDURE — 93005 ELECTROCARDIOGRAM TRACING: CPT

## 2019-02-01 PROCEDURE — 93010 ELECTROCARDIOGRAM REPORT: CPT | Mod: ,,, | Performed by: INTERNAL MEDICINE

## 2019-02-01 RX ORDER — GABAPENTIN 300 MG/1
600 CAPSULE ORAL EVERY MORNING
Qty: 90 CAPSULE | Refills: 0 | Status: SHIPPED | OUTPATIENT
Start: 2019-02-01 | End: 2019-02-19 | Stop reason: SDUPTHER

## 2019-02-01 NOTE — ED NOTES
Pt to Room 25 with c/o bilateral lower extremity numbness. Pt states this has been going on for more than a week. She was seen by her PCP with these symptoms and was referred to a Neurologist and prescribed Gabapentin. Pt denies any nausea, vomiting, diarrhea, constipation, dizziness, weakness, headache or vision changes. Pt has no stroke symptoms. Pt is AAO x 3. Respirations even and unlabored, lung sounds clear and equal bilaterally. Skin warm and dry to touch, redness noted to the lower legs but no swelling noted. Abdomen soft and non-distended, no guarding or tenderness noted. Family at the bedside. VSS. No acute distress noted. Will continue to monitor closely.

## 2019-02-01 NOTE — ED PROVIDER NOTES
Encounter Date: 2/1/2019       History     Chief Complaint   Patient presents with    Numbness     pt reports numbness, tingling, burning, and feeling cold to bilateral lower extremities x 2 days. pt reports that at 2000 on 1/31/19 she had an episode of decreased sensation to left side of her jaw that lasted sceonds and the resolved. pt also reports intermittenmt cp overteh last few days. presently denies cp but reports chest heaviness.      Afebrile 74-year-old female with HTN, asthma, HLD, DM, GERD, irritable bowel syndrome, anxiety, chest pain, arthritis, shingles, TIA, CAD, and atrial fibrillation presents to the ED for evaluation lower extremity weakness with associated decreased sensation, burning and tingling pain to bilateral lower extremities. Patient reports that she has had worsening weakness for some time and is currently due to see neurologist in the near future for evaluation.  She states that she is here today because the weakness has continued and that the burning tingling pain has gone from intermittent to constant over the past several days.  She does report that is worse with prolonged standing and certain movements.  Patient states that radiates from bilateral feet to mid thighs.  Patient denies any upper extremity weakness. Patient does report that she had an episode of left-sided neck and jaw pain few days ago that lasted moments and resolved on its own volition.  Patient denies any current chest pain, headache, vision changes, no urinary retention, no bowel or bladder incontinence, leg swelling, shortness of breath, fever, chills, nausea, vomiting or other complaints at this time.  Patient is currently on gabapentin however states that she takes this for mesenteric ischemia and has no known diabetic polyneuropathy      The history is provided by the patient.     Review of patient's allergies indicates:   Allergen Reactions    Celebrex [celecoxib] Shortness Of Breath    Ciprofloxacin  Swelling     lip    Dicyclomine Hives    Adhesive Dermatitis    Avelox [moxifloxacin] Swelling    Cilostazol Other (See Comments)     Elevates blood pressure    Eryc [erythromycin] Other (See Comments)     unknown    Iodine and iodide containing products Hives    Keflex [cephalexin] Hives    Meclomen      rashes    Meloxicam      Ear ringing    Metoclopramide Other (See Comments)     High blood pressure    Sulfa (sulfonamide antibiotics) Itching     Past Medical History:   Diagnosis Date    Anticoagulant long-term use     on Plavix since May 2015    Anxiety     Arthritis     Asthma     Atrial fibrillation     Cataracts, bilateral     Chest pain, atypical     Colon polyp     Coronary artery disease     Diabetes mellitus     Dry eyes     Esophageal erosions     GERD (gastroesophageal reflux disease)     Heart murmur     Hemorrhoid     High cholesterol     Hypertension     Irritable bowel syndrome     Shingles 2015    TIA (transient ischemic attack)     Use of cane as ambulatory aid      Past Surgical History:   Procedure Laterality Date    ABDOMINAL SURGERY      angiocele      2007, 2014    ANGIOGRAM-ABDOMINAL N/A 5/5/2015    [facility] OR 2ND FLR    ANGIOGRAM-ABDOMINAL SMA stent Left Brachial access. Right arm over head. Left 4/29/2015    Performed by [facility] at [facility] OR 2ND FLR    ANGIOGRAM-ABDOMINAL-INTESTINAL - Mesenteric angiogram Right CFA access, possible left arm access  Right 1/5/2016    Performed by [facility] at [facility] OR 2ND FLR    ANGIOGRAM-MESENTERIC  1/5/2016    Performed by [facility] at [facility] OR 2ND FLR    BREAST SURGERY      left--- a lump--- no cancer    COLONOSCOPY  2014    COLONOSCOPY N/A 3/14/2018    Performed by [facility]ren Kemp [facility] at [facility] ENDO (4TH FLR)    ESOPHAGOGASTRODUODENOSCOPY (EGD) N/A 2/22/2016    Performed by [facility] at [facility] ENDO (4TH FLR)    EXPLORATION-BLEEDING (RE-EXPLORATION) Left  5/7/2015    [facility] OR 2ND FLR    HERNIA REPAIR      HYSTERECTOMY  partial    1982 partial hysterectomy    left nasal polyp      left neck lymph node      nose polyp      PLACEMENT-STENT Right 1/5/2016    Performed by [facility] OR 2ND FLR    REPAIR-HERNIA-LAPAROSCOPIC-INCISIONAL w/ mesh N/A 6/12/2017    Performed by [facility]igan [facility] at [facility] OR [facility]    right hip fatty mass tissue      stent to small intestine      SUPERIOR VENA CAVA ANGIOPLASTY / STENTING      THROMBECTOMY - left brachial artery Left 5/5/2015    Per[facility] by [facility] OR [facility] F[facility]    TONSILLECTOMY      TOTAL ABDOMINAL HYSTERECTOMY      2014    TOTAL KNEE ARTHROPLASTY Bilateral     UPPER GASTROINTESTINAL ENDOSCOPY  2014     Family History   Problem Relation Age of Onset    Colon cancer Neg Hx     Inflammatory bowel disease Neg Hx      Social History     Tobacco Use    Smoking status: Never Smoker    Smokeless tobacco: Never Used   Substance Use Topics    Alcohol use: No    Drug use: No     Review of Systems   Constitutional: Negative for activity change, appetite change and fever.   HENT: Negative for ear pain, facial swelling and sore throat.         Two episodes of left jaw pain lasting a minute that have resolved   Eyes: Negative for visual disturbance.   Respiratory: Negative for shortness of breath.    Cardiovascular: Negative for chest pain, palpitations and leg swelling.   Gastrointestinal: Negative for abdominal pain, nausea and vomiting.   Genitourinary: Negative for dysuria.   Musculoskeletal: Positive for arthralgias (Bilateral lower extremity pain) and gait problem. Negative for back pain, joint swelling, neck pain and neck stiffness.   Skin: Negative for color change, rash and wound.   Neurological: Positive for numbness (To bilateral lower extremities). Negative for dizziness, syncope, weakness, light-headedness and headaches.    Hematological: Does not bruise/bleed easily.   Psychiatric/Behavioral: Negative for confusion.       Physical Exam     Initial Vitals [02/01/19 1122]   BP Pulse Resp Temp SpO2   (!) 149/75 (!) 112 19 98.4 °F (36.9 °C) 99 %      MAP       --         Physical Exam    Nursing note and vitals reviewed.  Constitutional: Vital signs are normal. She appears well-developed and well-nourished. She is cooperative.  Non-toxic appearance. She does not appear ill.   Slowed however steady gait with cane assistance   HENT:   Head: Normocephalic and atraumatic.   Eyes: Conjunctivae and lids are normal.   Neck: Neck supple. No neck rigidity.   Cardiovascular: Normal rate. An irregularly irregular rhythm present.    Murmur heard.   Systolic murmur is present.  Pulses:       Dorsalis pedis pulses are 2+ on the right side, and 1+ on the left side.        Posterior tibial pulses are 2+ on the right side, and 2+ on the left side.   Pulmonary/Chest: Breath sounds normal. No respiratory distress. She has no wheezes. She has no rhonchi.   Abdominal: Soft. Normal appearance and bowel sounds are normal. There is no tenderness. There is no rigidity and no guarding.   Musculoskeletal: Normal range of motion.   No spinous tenderness, edema, or erythema   Neurological: She is alert and oriented to person, place, and time. GCS eye subscore is 4. GCS verbal subscore is 5. GCS motor subscore is 6.   Patient reports some diminished  2 point discrimination to bilateral feet.  Mild atrophic changes.  1+ edema to bilateral lower extremities.  Capillary refill intact.   Skin: Skin is warm, dry and intact. No bruising, no ecchymosis and no rash noted. No pallor.   Psychiatric: She has a normal mood and affect. Her speech is normal and behavior is normal. Thought content normal.         ED Course   Procedures  Labs Reviewed   CBC W/ AUTO DIFFERENTIAL - Abnormal; Notable for the following components:       Result Value    RDW 14.6 (*)     All other  components within normal limits   COMPREHENSIVE METABOLIC PANEL - Abnormal; Notable for the following components:    Glucose 111 (*)     Alkaline Phosphatase 47 (*)     All other components within normal limits   TROPONIN I          Imaging Results          US Lower Extremity Arteries Bilateral (Final result)  Result time 02/01/19 14:16:24    Final result by Terence Aragon MD (02/01/19 14:16:24)                 Impression:      No hemodynamically significant stenosis demonstrated in the right or left lower extremity arterial system.      Electronically signed by: Terence Aragon MD  Date:    02/01/2019  Time:    14:16             Narrative:    EXAMINATION:  US LOWER EXTREMITY ARTERIES BILATERAL    CLINICAL HISTORY:  Anesthesia of skin    TECHNIQUE:  Bilateral lower extremity arterial duplex ultrasound examination performed. Multiple gray scale and color doppler images were obtained in addition to waveform analysis.    COMPARISON:  None    FINDINGS:  The peak systolic velocities on the right are as follows, in centimeters/second:    Common femoral artery: 101    Superficial femoral artery, proximal: 101    Superficial femoral artery, mid portion: 101    Superficial femoral artery, distal: 98    Popliteal artery: 67.6    Posterior tibial artery: 44.5    Anterior tibial artery: 54    The peak systolic velocities on the left are as follows, in centimeters/second:    Common femoral artery: 118    Superficial femoral artery, proximal: 111    Superficial femoral artery, mid portion: 112    Superficial femoral artery, distal: 103    Popliteal artery: 70    Posterior tibial artery: 66.7    Anterior tibial artery: 47.4    Normal arterial waveforms are demonstrated.                               CT Head Without Contrast (Final result)  Result time 02/01/19 13:20:33    Final result by Emmanuel Drake MD (02/01/19 13:20:33)                 Impression:      1. No acute intracranial CT abnormalities.  2. Mild chronic  microvascular ischemic changes.      Electronically signed by: Emmanuel Drake MD  Date:    02/01/2019  Time:    13:20             Narrative:    EXAMINATION:  CT HEAD WITHOUT CONTRAST    CLINICAL HISTORY:  numbness and tingling both lower extremities;    TECHNIQUE:  5-mm axial images were obtained through the head without the use of contrast.  Coronal and sagittal reformats were performed.    COMPARISON:  CT 05/09/2015.    FINDINGS:  No CT findings to suggest an acute major vascular distribution infarct.  Minimal periventricular white matter hypoattenuation identified, nonspecific but most suggestive of chronic microvascular ischemic changes.  No intra or extra-axial hemorrhage.  No midline shift or mass effect.  No hydrocephalus.  Sellar region is unremarkable.    Paranasal sinuses and mastoid air cells are clear.  No acute osseous abnormalities.  Subcutaneous soft tissues are normal.                               X-Ray Chest AP Portable (Final result)  Result time 02/01/19 12:34:39    Final result by Gene Swenson MD (02/01/19 12:34:39)                 Impression:      No detrimental change or radiographic acute intrathoracic process seen on this single view.      Electronically signed by: Gene Swenson MD  Date:    02/01/2019  Time:    12:34             Narrative:    EXAMINATION:  XR CHEST AP PORTABLE    CLINICAL HISTORY:  Other chest pain    TECHNIQUE:  Single frontal view of the chest was performed.    COMPARISON:  Chest radiograph 06/22/2017    FINDINGS:  Monitoring leads overlie the chest.  Chronic mild nonspecific elevation of the right hemidiaphragm.  Cardiomediastinal silhouette remains prominent similar to prior without evidence of failure.  Calcific atherosclerosis of the thoracic aorta.  No large consolidation or new focal opacity.  No pleural effusion or pneumothorax.  No acute osseous process seen.  PA and lateral views can be obtained.                                 Medical Decision Making:    Initial Assessment:   74-year-old female presents to the ED for evaluation of lower extremity weakness and burning tingling pain. Patient is nontoxic appearing.  Patient exhibits fair range of motion of all extremities with strength equal bilaterally. I did visualize patient ambulatory in the ED with assistance of cane no wide-based gait and walking with no assistance other than cane as she typically does.  Lungs CTA; heart with regular rate with irregular regular rhythm. Lungs CTA.  Abdomen is soft and nontender. Patient is alert and oriented x3 with no focal neuro deficit. Patient reports some diminished  2 point discrimination to bilateral feet.  Mild atrophic changes.  1+ edema to bilateral lower extremities.  Capillary refill intact.  Differential Diagnosis:   Differential Diagnosis includes, but is not limited to:  CVA, TIA, SAH, intracranial mass, electrolyte abnormality, anemia, hemorrhage, renal failure, hepatic failure, acute coronary syndrome, sepsis/infection, UTI, viral illness, arrhythmia, CHF, thyroid disease, dehydration, diabetic neuropathy, neuromuscular disorder, arterial occlusion    ED Management:  Labs, ultrasound and CT of head with no significant abnormalities.  EKG is grossly unremarkable. We did discuss that symptoms are more consistent with worsening neuropathy and that she should keep follow-up appointment with Neurology for evaluation and possible EMG.  Did consider other neuromuscular disorder however low suspicion of acute emergent process at this time is patient with no focal finding of weakness and continues with no focal neuro deficit at this time.  Noted subjective decreased 2 point discrimination. Strict instructions to follow up with primary care physician or reference provided for further assessment and evaluation. Given instructions to return for any acute symptoms and verbalized understanding of this medical plan.                     ED Course as of Feb 01 1222 Fri Feb 01,  2019   1123 Sort note: Adriana Strong nontoxic/afebrile 74 y.o.  presented to the ED with c/o pt reports numbness, tingling, burning, and feeling cold to bilateral lower extremities x 2 days. pt reports that at 2000 on 1/31/19 she had an episode of decreased sensation to left side of her jaw that lasted sceonds and the resolved. pt also reports intermittenmt cp overteh last few days. presently denies cp but reports chest heaviness.      Patient seen and medically screened by Physician assistant in Sort process due to ED crowding.  Appropriate tests and/or medications ordered.  Care transferred to an alternate provider when patient was placed in an Exam Room from the [facility] for physical exam, additional orders, and disposition. AHM    [AM]      ED Course User Index  [AM] Lucretia Pimentel PA-C     Clinical Impression:   The primary encounter diagnosis was Peripheral polyneuropathy. Diagnoses of Chest pain, Chest heaviness, and Numbness and tingling of both lower extremities were also pertinent to this visit.                             JUSTUS Sousa  02/05/19 2031

## 2019-02-19 ENCOUNTER — OFFICE VISIT (OUTPATIENT)
Dept: NEUROLOGY | Facility: CLINIC | Age: 74
End: 2019-02-19
Payer: MEDICARE

## 2019-02-19 ENCOUNTER — LAB VISIT (OUTPATIENT)
Dept: LAB | Facility: HOSPITAL | Age: 74
End: 2019-02-19
Attending: PSYCHIATRY & NEUROLOGY
Payer: MEDICARE

## 2019-02-19 VITALS
HEART RATE: 81 BPM | SYSTOLIC BLOOD PRESSURE: 126 MMHG | BODY MASS INDEX: 38.28 KG/M2 | DIASTOLIC BLOOD PRESSURE: 68 MMHG | HEIGHT: 64 IN

## 2019-02-19 DIAGNOSIS — G60.3 IDIOPATHIC PROGRESSIVE NEUROPATHY: ICD-10-CM

## 2019-02-19 DIAGNOSIS — E11.42 TYPE 2 DIABETES MELLITUS WITH DIABETIC POLYNEUROPATHY: ICD-10-CM

## 2019-02-19 DIAGNOSIS — G62.9 POLYNEUROPATHY: ICD-10-CM

## 2019-02-19 DIAGNOSIS — E11.42 CONTROLLED TYPE 2 DIABETES MELLITUS WITH DIABETIC POLYNEUROPATHY, WITHOUT LONG-TERM CURRENT USE OF INSULIN: ICD-10-CM

## 2019-02-19 DIAGNOSIS — E74.89 OTHER SPECIFIED DISORDERS OF CARBOHYDRATE METABOLISM: ICD-10-CM

## 2019-02-19 DIAGNOSIS — G62.9 POLYNEUROPATHY: Primary | ICD-10-CM

## 2019-02-19 PROBLEM — K43.9 VENTRAL HERNIA: Status: RESOLVED | Noted: 2017-06-12 | Resolved: 2019-02-19

## 2019-02-19 PROBLEM — J20.9 ACUTE BRONCHITIS: Status: RESOLVED | Noted: 2017-01-03 | Resolved: 2019-02-19

## 2019-02-19 PROBLEM — J44.1 COPD EXACERBATION: Status: RESOLVED | Noted: 2017-01-02 | Resolved: 2019-02-19

## 2019-02-19 PROBLEM — Z79.01 CHRONIC ANTICOAGULATION: Status: RESOLVED | Noted: 2018-01-16 | Resolved: 2019-02-19

## 2019-02-19 PROBLEM — J96.00 ACUTE RESPIRATORY FAILURE: Status: RESOLVED | Noted: 2017-01-03 | Resolved: 2019-02-19

## 2019-02-19 PROBLEM — E87.20 LACTIC ACIDOSIS: Status: RESOLVED | Noted: 2017-05-28 | Resolved: 2019-02-19

## 2019-02-19 PROBLEM — R60.0 LOCALIZED EDEMA: Status: RESOLVED | Noted: 2017-02-24 | Resolved: 2019-02-19

## 2019-02-19 PROBLEM — R10.9 ABDOMINAL PAIN: Status: RESOLVED | Noted: 2018-03-14 | Resolved: 2019-02-19

## 2019-02-19 PROBLEM — N39.0 UTI (URINARY TRACT INFECTION): Status: RESOLVED | Noted: 2017-05-29 | Resolved: 2019-02-19

## 2019-02-19 LAB
ESTIMATED AVG GLUCOSE: 154 MG/DL
HBA1C MFR BLD HPLC: 7 %
TSH SERPL DL<=0.005 MIU/L-ACNC: 1.45 UIU/ML
VIT B12 SERPL-MCNC: 846 PG/ML

## 2019-02-19 PROCEDURE — 99999 PR PBB SHADOW E&M-EST. PATIENT-LVL III: ICD-10-PCS | Mod: PBBFAC,,, | Performed by: PSYCHIATRY & NEUROLOGY

## 2019-02-19 PROCEDURE — 3074F PR MOST RECENT SYSTOLIC BLOOD PRESSURE < 130 MM HG: ICD-10-PCS | Mod: CPTII,S$GLB,, | Performed by: PSYCHIATRY & NEUROLOGY

## 2019-02-19 PROCEDURE — 83036 HEMOGLOBIN GLYCOSYLATED A1C: CPT

## 2019-02-19 PROCEDURE — 1101F PR PT FALLS ASSESS DOC 0-1 FALLS W/OUT INJ PAST YR: ICD-10-PCS | Mod: CPTII,S$GLB,, | Performed by: PSYCHIATRY & NEUROLOGY

## 2019-02-19 PROCEDURE — 1101F PT FALLS ASSESS-DOCD LE1/YR: CPT | Mod: CPTII,S$GLB,, | Performed by: PSYCHIATRY & NEUROLOGY

## 2019-02-19 PROCEDURE — 99204 PR OFFICE/OUTPT VISIT, NEW, LEVL IV, 45-59 MIN: ICD-10-PCS | Mod: S$GLB,,, | Performed by: PSYCHIATRY & NEUROLOGY

## 2019-02-19 PROCEDURE — 3078F DIAST BP <80 MM HG: CPT | Mod: CPTII,S$GLB,, | Performed by: PSYCHIATRY & NEUROLOGY

## 2019-02-19 PROCEDURE — 84443 ASSAY THYROID STIM HORMONE: CPT

## 2019-02-19 PROCEDURE — 84425 ASSAY OF VITAMIN B-1: CPT

## 2019-02-19 PROCEDURE — 3078F PR MOST RECENT DIASTOLIC BLOOD PRESSURE < 80 MM HG: ICD-10-PCS | Mod: CPTII,S$GLB,, | Performed by: PSYCHIATRY & NEUROLOGY

## 2019-02-19 PROCEDURE — 36415 COLL VENOUS BLD VENIPUNCTURE: CPT

## 2019-02-19 PROCEDURE — 99204 OFFICE O/P NEW MOD 45 MIN: CPT | Mod: S$GLB,,, | Performed by: PSYCHIATRY & NEUROLOGY

## 2019-02-19 PROCEDURE — 82607 VITAMIN B-12: CPT

## 2019-02-19 PROCEDURE — 3074F SYST BP LT 130 MM HG: CPT | Mod: CPTII,S$GLB,, | Performed by: PSYCHIATRY & NEUROLOGY

## 2019-02-19 PROCEDURE — 99999 PR PBB SHADOW E&M-EST. PATIENT-LVL III: CPT | Mod: PBBFAC,,, | Performed by: PSYCHIATRY & NEUROLOGY

## 2019-02-19 RX ORDER — GABAPENTIN 300 MG/1
600 CAPSULE ORAL 3 TIMES DAILY
Qty: 540 CAPSULE | Refills: 3 | Status: SHIPPED | OUTPATIENT
Start: 2019-02-19 | End: 2019-05-14

## 2019-02-19 NOTE — PATIENT INSTRUCTIONS
Peripheral Neuropathy  Peripheral neuropathy is a condition that affects the nerves of the arms or legs. It causes a change in physical feeling. Sometimes it causes weakness in the muscles. You may feel tingling, numbness or shooting pains. Symptoms may be more common at night. Skin may be extra sensitive to light touch or temperature changes.  Neuropathy may be a complication of a chronic disease such as diabetes. A ruptured disk with pressure on the spinal nerve may also lead to the problem. Certain vitamin deficiencies may lead to it. It may also be caused by exposure to certain drugs or chemicals.  Home care  · Tell the healthcare provider about all medicines you take. This includes prescription and over-the-counter medicines, vitamins, and herbs. Ask if any of the medicines may be causing your problems. Do not make any changes to prescription medicines without talking to your healthcare provider first.  · You may be prescribed medicines to help relieve the tingling feeling or for pain. Take all medicines as directed.  · A numb hand or foot may be more prone to injury. To help protect it:  ¨ Always use oven mitts.  ¨ Test water with an unaffected hand or foot.  ¨ Use caution when trimming nails. File sharp areas.  ¨ Wear shoes that fit well to avoid pressure points, blisters, and ulcers.  ¨ Inspect your hands and feet carefully (including the soles of your feet and between your toes) at least once a week. If you see red areas, sores, or other problems, tell your healthcare provider.  Follow-up care  Follow up with your doctor or as advised by our staff. You may need further testing or evaluation.  When to seek medical advice  Call your healthcare provider right away if any of the following occur:  · Redness, swelling, cracking, or ulcer on any numb area, especially the feet  · New symptoms of numbness or muscle weakness numbness  · Loss of bowel or bladder control  · Slurred speech, confusion, or trouble  speaking, walking, or seeing  Date Last Reviewed: 9/26/2015  © 4706-7956 [employer]. [address] [place] [address], [place] [address]. All rights reserved. This information is not intended as a substitute for professional medical care. Always follow your healthcare professional's instructions.

## 2019-02-19 NOTE — PROGRESS NOTES
[facility] - NEUROLOGY EPILEPSY  [facility] [place]    Date: 2/19/19  Patient Name: Adriana Strong   MRN: [id number]   PCP: Krzysztof Mcgraw  Referring Provider: Self, Aaareferral    Assessment:   Adriana Strong is a 74 y.o. female presenting for evaluation of chronic polyneuropathy.  Likely attributed to the patient's history of currently uncontrolled diabetes.  Will obtain updated A1c with screening labs listed below.  Increasing gabapentin to 600 mg t.i.d. with further dose up titration as needed.    Plan:     Problem List Items Addressed This Visit     None      Visit Diagnoses     Polyneuropathy    -  Primary    Relevant Orders    Hemoglobin A1c    TSH    Vitamin B1    Vitamin B12    Other specified disorders of carbohydrate metabolism         Relevant Orders    Hemoglobin A1c    Type 2 diabetes mellitus with diabetic polyneuropathy         Relevant Orders    TSH    Vitamin B12    Idiopathic progressive neuropathy         Relevant Orders    Vitamin B12        Sean Saravia MD  [facility]   Department of Neurology    Patient note was created using MModal Dictation.  Any errors in syntax or even information may not have been identified and edited on initial review prior to signing this note.  Subjective:     HPI:   Ms. Adriana Strong is a 74 y.o. female presenting for evaluation of lower extremity painful paresthesias.  She presents today with her family who contribute to the history.  The patient reports that over the past year she has noted progressive numbness and tingling in her legs bilaterally to the knees with associated burning and stinging paresthesias worst in the soles of her feet and extending upward along both of her legs bilaterally.  Gabapentin has partially control the pain. Of note, the patient has a long history of partially controlled diabetes.  Her most recent A1c on record with [facility] 6.7.  She states that she had it checked by her PCP who is a  physician at [facility] with him we do not sure records.  She states this was several months ago was told that it was a lot higher though she does not not know the actual number.  She does report chronic gait unsteadiness which she states has been present for the last 8 years causing her to walk with a cane.  She attributes this to history of bilateral knee replacements and overall deconditioning.  She has noted a mild increase in instability particularly when walking around in dark rooms consistent with a sensory ataxia.  She denies any other focal neurologic deficits    PAST MEDICAL HISTORY:  Past Medical History:   Diagnosis Date    Anticoagulant long-term use     on Plavix since May 2015    Anxiety     Arthritis     Asthma     Atrial fibrillation     Cataracts, bilateral     Chest pain, atypical     Colon polyp     Coronary artery disease     Diabetes mellitus     Dry eyes     Esophageal erosions     GERD (gastroesophageal reflux disease)     Heart murmur     Hemorrhoid     High cholesterol     Hypertension     Irritable bowel syndrome     Shingles 2015    TIA (transient ischemic attack)     Use of cane as ambulatory aid        PAST SURGICAL HISTORY:  Past Surgical History:   Procedure Laterality Date    ABDOMINAL SURGERY      angiocele      2007, 2014    ANGIOGRAM-ABDOMINAL N/A 5/5/2015    [facility]    ANGIOGRAM-ABDOMINAL SMA stent Left Brachial access. Right arm over head. Left 4/29/2015    [facility]    ANGIOGRAM-ABDOMINAL-INTESTINAL - Mesenteric angiogram Right CFA access, possible left arm access  Right 1/5/2016    [facility]    ANGIOGRAM-MESENTERIC  1/5/2016    [facility]    BREAST SURGERY      left--- a lump--- no cancer    COLONOSCOPY  2014    COLONOSCOPY N/A 3/14/2018    [facility] at [facility]  ENDO (4TH [facility]LR)    ESOPHAGOGASTRODUODENOSCOPY (EGD) N/A 2/22/2016    [facility] ENDO (4TH [facility])    EXPLORATION-BLEEDING (RE-EXPLORATION) Left 5/7/2015    [facility] OR [facility]    HERNIA REPAIR      HYSTERECTOMY  partial    1982 partial hysterectomy    left nasal polyp      left neck lymph node      nose polyp      PLACEMENT-STENT Right 1/5/2016    [facility] OR [facility]    REPAIR-HERNIA-LAPAROSCOPIC-INCISIONAL w/ mesh N/A 6/12/2017    Performed [facility] at [facility] OR 2ND [facility]    right hip fatty mass tissue      stent to small intestine      SUPERIOR VENA CAVA ANGIOPLASTY / STENTING      THROMBECTOMY - left brachial artery Left 5/5/2015    [facility] OR [facility]    TONSILLECTOMY      TOTAL ABDOMINAL HYSTERECTOMY      2014    TOTAL KNEE ARTHROPLASTY Bilateral     UPPER GASTROINTESTINAL ENDOSCOPY  2014       CURRENT MEDS:  Current Outpatient Medications   Medication Sig Dispense Refill    acetaminophen (TYLENOL ARTHRITIS) 650 MG TbSR Take 1,300 mg by mouth 2 (two) times daily.      albuterol (VENTOLIN HFA) 90 mcg/actuation inhaler Inhale 2 puffs into the lungs every 6 (six) hours as needed for Wheezing. Rescue      apixaban 5 mg Tab Take 1 tablet (5 mg total) by mouth 2 (two) times daily. 180 tablet 3    ascorbic acid (VITAMIN C) 500 MG tablet Take 500 mg by mouth once daily.      azelastine (ASTELIN) 137 mcg nasal spray 1 spray by Nasal route 2 (two) times daily.      budesonide-formoterol 160-4.5 mcg (SYMBICORT) 160-4.5 mcg/actuation HFAA Inhale 2 puffs into the lungs every 12 (twelve) hours. Controller      calcium carbonate (OS-MICHAEL) 600 mg (1,500 mg) Tab Take 1,200 mg by mouth once daily.      clopidogrel (PLAVIX) 75 mg tablet Take 1 tablet (75 mg total) by mouth once daily. 90 tablet 4    cyanocobalamin (VITAMIN B-12) 1000 MCG tablet Take 100 mcg by mouth once  daily.      digoxin (LANOXIN) 125 mcg tablet Take 1 tablet (125 mcg total) by mouth once daily. 90 tablet 3    diltiaZEM (CARDIZEM CD) 180 MG 24 hr capsule Take 1 capsule (180 mg total) by mouth once daily. 90 capsule 3    docusate sodium (COLACE) 100 MG capsule Take 1 capsule (100 mg total) by mouth 2 (two) times daily. 60 capsule 1    fexofenadine (ALLEGRA) 60 MG tablet Take 60 mg by mouth every morning.      fish oil-omega-3 fatty acids 300-1,000 mg capsule Take 1 g by mouth once daily.       fluticasone (FLONASE) 50 mcg/actuation nasal spray 1 spray by Each Nare route every morning.       furosemide (LASIX) 40 MG tablet Take 1 tablet (40 mg total) by mouth once daily. 90 tablet 3    gabapentin (NEURONTIN) 300 MG capsule Take 2 capsules (600 mg total) by mouth 3 (three) times daily. 540 capsule 3    lisinopril (PRINIVIL,ZESTRIL) 20 MG tablet Take 1 tablet (20 mg total) by mouth once daily. 90 tablet 3    lorazepam (ATIVAN) 0.5 MG tablet Take 0.5 mg by mouth every morning.       magnesium 250 mg Tab Take 250 mg by mouth. 3 in am and 3 pm      ondansetron (ZOFRAN-ODT) 4 MG TbDL DISSOLVE 1 TABLET ON TONGUE EVERY 8 HOURS AS NEEDED FOR NAUSEA  1    oxybutynin (DITROPAN-XL) 5 MG TR24 Take 5 mg by mouth every evening.       pantoprazole (PROTONIX) 40 MG tablet Take 1 tablet (40 mg total) by mouth once daily. (Patient taking differently: Take 40 mg by mouth every morning. ) 90 tablet 3    polyethylene glycol (GLYCOLAX) 17 gram PwPk Take 17 g by mouth once daily.      POLYSORBATE 80/GLYCERIN (REFRESH DRY EYE THERAPY OPHT) Apply to eye 2 (two) times daily.      potassium gluconate 550 mg (90 mg) Tab Take by mouth. 2 in am and 2 pm      psyllium (METAMUCIL) powder Take 1 packet by mouth 3 (three) times daily.      traMADol (ULTRAM) 50 mg tablet Take 1 tablet (50 mg total) by mouth every 6 (six) hours as needed for Pain. 20 tablet 0    zafirlukast (ACCOLATE) 20 MG tablet Take 20 mg by mouth once daily.  3  "   albuterol (ACCUNEB) 1.25 mg/3 mL Nebu Take scheduled q4 for the next two days while at home then resume prn dosing 3 mL 1     No current facility-administered medications for this visit.        ALLERGIES:  Review of patient's allergies indicates:   Allergen Reactions    Celebrex [celecoxib] Shortness Of Breath    Ciprofloxacin Swelling     lip    Dicyclomine Hives    Adhesive Dermatitis    Avelox [moxifloxacin] Swelling    Cilostazol Other (See Comments)     Elevates blood pressure    Eryc [erythromycin] Other (See Comments)     unknown    Iodine and iodide containing products Hives    Keflex [cephalexin] Hives    Meclomen      rashes    Meloxicam      Ear ringing    Metoclopramide Other (See Comments)     High blood pressure    Sulfa (sulfonamide antibiotics) Itching       FAMILY HISTORY:  Family History   Problem Relation Age of Onset    Colon cancer Neg Hx     Inflammatory bowel disease Neg Hx        SOCIAL HISTORY:  Social History     Tobacco Use    Smoking status: Never Smoker    Smokeless tobacco: Never Used   Substance Use Topics    Alcohol use: No    Drug use: No       Review of Systems:  12 system review of systems is negative except for the symptoms mentioned in HPI.      Objective:     Vitals:    02/19/19 1303   BP: 126/68   Pulse: 81   Height: 5' 4" (1.626 m)     General: NAD, well nourished   Eyes: no tearing, discharge, no erythema   ENT: moist mucous membranes of the oral cavity, nares patent    Neck: Supple, full range of motion  Cardiovascular: Warm and well perfused, pulses equal and symmetrical  Lungs: Normal work of breathing, normal chest wall excursions  Skin: No rash, lesions, or breakdown on exposed skin  Psychiatry: Mood and affect are appropriate   Abdomen: soft, non tender, non distended  Extremeties: No cyanosis, clubbing or edema.    Neurological   MENTAL STATUS: Alert and oriented to person, place, and time. Attention and concentration within normal limits. Speech " without dysarthria, able to name and repeat without difficulty. Recent and remote memory within normal limits   CRANIAL NERVES: Visual fields intact. PERRL. EOMI. Facial sensation intact. Face symmetrical. Hearing grossly intact. Full shoulder shrug bilaterally. Tongue protrudes midline   SENSORY: Sensation is reduced to LT, ST, temp, and vibration to knees  Joint position perception reduced bilaterally. Positive Romberg.   MOTOR: Normal bulk and tone. 5/5 deltoid, biceps, triceps, interosseous, hand  bilaterally. 4/5 iliopsoas, knee extension/flexion, foot dorsi/plantarflexion bilaterally.    REFLEXES: Symmetric and 1+ throughout. Toes down going bilaterally.   CEREBELLAR/COORDINATION/GAIT: Gait wide based and unsteady. Finger to nose intact. Normal rapid alternating movements.

## 2019-02-25 ENCOUNTER — PATIENT MESSAGE (OUTPATIENT)
Dept: NEUROLOGY | Facility: CLINIC | Age: 74
End: 2019-02-25

## 2019-02-25 LAB — VIT B1 BLD-MCNC: 69 UG/L (ref 38–122)

## 2019-04-25 ENCOUNTER — PATIENT MESSAGE (OUTPATIENT)
Dept: VASCULAR SURGERY | Facility: CLINIC | Age: 74
End: 2019-04-25

## 2019-04-25 ENCOUNTER — TELEPHONE (OUTPATIENT)
Dept: CARDIOLOGY | Facility: CLINIC | Age: 74
End: 2019-04-25

## 2019-04-25 ENCOUNTER — PATIENT MESSAGE (OUTPATIENT)
Dept: CARDIOLOGY | Facility: CLINIC | Age: 74
End: 2019-04-25

## 2019-04-25 NOTE — TELEPHONE ENCOUNTER
Dermatologist requests holding Eliquis for 3 days prior to cyst removal on face.    I spoke with patient and wife.    I explained to pt and wife in great detail that there is a small risk of stroke from holding the Eliquis and a small risk of mesenteric artery stent thrombosis from holding the clopidogrel.  I explained that both Eliquis and clopidogrel increase the risk of surgical bleeding.    Discussed that patient needs to weigh the benefits of the procedure vs the small risk of complications from holding blood thinners.

## 2019-05-14 ENCOUNTER — OFFICE VISIT (OUTPATIENT)
Dept: NEUROLOGY | Facility: CLINIC | Age: 74
End: 2019-05-14
Payer: MEDICARE

## 2019-05-14 VITALS
HEIGHT: 64 IN | SYSTOLIC BLOOD PRESSURE: 123 MMHG | HEART RATE: 82 BPM | BODY MASS INDEX: 39.3 KG/M2 | WEIGHT: 230.19 LBS | DIASTOLIC BLOOD PRESSURE: 77 MMHG

## 2019-05-14 DIAGNOSIS — R29.818 NEUROGENIC CLAUDICATION: Primary | ICD-10-CM

## 2019-05-14 DIAGNOSIS — E11.42 CONTROLLED TYPE 2 DIABETES MELLITUS WITH DIABETIC POLYNEUROPATHY, WITHOUT LONG-TERM CURRENT USE OF INSULIN: ICD-10-CM

## 2019-05-14 DIAGNOSIS — M51.36 LUMBAR DEGENERATIVE DISC DISEASE: ICD-10-CM

## 2019-05-14 DIAGNOSIS — E11.42 DIABETIC POLYNEUROPATHY ASSOCIATED WITH TYPE 2 DIABETES MELLITUS: ICD-10-CM

## 2019-05-14 PROBLEM — G89.29 CHRONIC BILATERAL BACK PAIN: Status: ACTIVE | Noted: 2019-05-14

## 2019-05-14 PROBLEM — M51.369 LUMBAR DEGENERATIVE DISC DISEASE: Status: ACTIVE | Noted: 2019-05-14

## 2019-05-14 PROBLEM — H26.9 CATARACT: Status: ACTIVE | Noted: 2019-05-14

## 2019-05-14 PROBLEM — M54.9 CHRONIC BILATERAL BACK PAIN: Status: ACTIVE | Noted: 2019-05-14

## 2019-05-14 PROCEDURE — 3078F DIAST BP <80 MM HG: CPT | Mod: CPTII,S$GLB,, | Performed by: PSYCHIATRY & NEUROLOGY

## 2019-05-14 PROCEDURE — 3078F PR MOST RECENT DIASTOLIC BLOOD PRESSURE < 80 MM HG: ICD-10-PCS | Mod: CPTII,S$GLB,, | Performed by: PSYCHIATRY & NEUROLOGY

## 2019-05-14 PROCEDURE — 1101F PR PT FALLS ASSESS DOC 0-1 FALLS W/OUT INJ PAST YR: ICD-10-PCS | Mod: CPTII,S$GLB,, | Performed by: PSYCHIATRY & NEUROLOGY

## 2019-05-14 PROCEDURE — 99215 OFFICE O/P EST HI 40 MIN: CPT | Mod: S$GLB,,, | Performed by: PSYCHIATRY & NEUROLOGY

## 2019-05-14 PROCEDURE — 99999 PR PBB SHADOW E&M-EST. PATIENT-LVL III: CPT | Mod: PBBFAC,,, | Performed by: PSYCHIATRY & NEUROLOGY

## 2019-05-14 PROCEDURE — 1101F PT FALLS ASSESS-DOCD LE1/YR: CPT | Mod: CPTII,S$GLB,, | Performed by: PSYCHIATRY & NEUROLOGY

## 2019-05-14 PROCEDURE — 99215 PR OFFICE/OUTPT VISIT, EST, LEVL V, 40-54 MIN: ICD-10-PCS | Mod: S$GLB,,, | Performed by: PSYCHIATRY & NEUROLOGY

## 2019-05-14 PROCEDURE — 3074F SYST BP LT 130 MM HG: CPT | Mod: CPTII,S$GLB,, | Performed by: PSYCHIATRY & NEUROLOGY

## 2019-05-14 PROCEDURE — 3074F PR MOST RECENT SYSTOLIC BLOOD PRESSURE < 130 MM HG: ICD-10-PCS | Mod: CPTII,S$GLB,, | Performed by: PSYCHIATRY & NEUROLOGY

## 2019-05-14 PROCEDURE — 99999 PR PBB SHADOW E&M-EST. PATIENT-LVL III: ICD-10-PCS | Mod: PBBFAC,,, | Performed by: PSYCHIATRY & NEUROLOGY

## 2019-05-14 RX ORDER — GABAPENTIN 300 MG/1
1200 CAPSULE ORAL 3 TIMES DAILY
Qty: 360 CAPSULE | Refills: 3 | Status: SHIPPED | OUTPATIENT
Start: 2019-05-14 | End: 2019-10-24 | Stop reason: SDUPTHER

## 2019-05-14 RX ORDER — DULOXETIN HYDROCHLORIDE 20 MG/1
20 CAPSULE, DELAYED RELEASE ORAL DAILY
Qty: 90 CAPSULE | Refills: 3 | Status: SHIPPED | OUTPATIENT
Start: 2019-05-14 | End: 2019-06-25 | Stop reason: SDUPTHER

## 2019-05-14 NOTE — ASSESSMENT & PLAN NOTE
-- increasing gabapentin to 1200 mg TID, if no improvement will move to lyrica  -- starting cymbalta as adjuvant  -- A1C 7.0

## 2019-05-14 NOTE — PROGRESS NOTES
[facility] - NEUROLOGY EPILEPSY  [facility] [place]    Date: 5/14/19   Patient Name: Adriana Strong   MRN: [id number]   PCP: Krzysztof Mcgraw  Referring Provider: No ref. provider found    Assessment:   Adriana Strong is a 74 y.o. female presenting for evaluation of chronic polyneuropathy now complaining of neurogenic claudication.  Reviewed B12 and B1, both of which were normal.  A1c 7.0 on 02/19/2019.  Will obtain updated MRI L-spine as has not been done years and patient reports significant history.  May consider EMG/nerve conduction study if no underlying pathology found on MRI.  Anticipate likely referral to pain management and physical therapy.  Will increase gabapentin to maximum dose 1200 mg t.i.d..  That feeling will consider Lyrica as an alternative.  Also adding Cymbalta as adjuvant today particularly given patient's concomitant anxiety.    Plan:     Problem List Items Addressed This Visit        Neuro    Diabetic polyneuropathy associated with type 2 diabetes mellitus    Current Assessment & Plan     -- increasing gabapentin to 1200 mg TID, if no improvement will move to lyrica  -- starting cymbalta as adjuvant  -- A1C 7.0         Lumbar degenerative disc disease    Current Assessment & Plan     -- obtaining updated MRI L spine, anticipate likely referral to pain management and PT  -- may require EMG/NCS pending results            Endocrine    Controlled type 2 diabetes mellitus with diabetic polyneuropathy, without long-term current use of insulin    Overview     A1C 7.0           Other Visit Diagnoses     Neurogenic claudication    -  Primary    Relevant Orders    MRI Lumbar Spine Without Contrast        Sean Saravia MD  [facility]   Department of Neurology    Patient note was created using MModal Dictation.  Any errors in syntax or even information may not have been identified and edited on initial review prior to signing this note.  Subjective:     HPI:    Ms. Adriana Strong is a 74 y.o. female presenting in follow-up for peripheral neuropathy.  She presents today with her  who contributes to the history.  Patient reports that she continues to experience persistent numbness that she is now feeling in her calves bilaterally. She reports neuropathic pain radiating down her legs that she states is worst with standing and walking and alleviated by sitting.  She reports a longstanding history chronic degenerative lumbar spinal issues today but states that it has been many years since she has had any imaging completed and she does not know where the previous MRI was completed.  She also today states that she previously saw pain management physician who recommended that she undergo a rhizotomy but she did not move forward with this or follow up with him stating that she was concerned that the procedure would kill all the nurse her body.  She has increased her gabapentin to 3 tabs t.i.d. with no improvement but is interested in trying maximum dosing of 4 tabs t.i.d. prior to a switch to Lyrica.    PAST MEDICAL HISTORY:  Past Medical History:   Diagnosis Date    Anticoagulant long-term use     on Plavix since May 2015    Anxiety     Arthritis     Asthma     Atrial fibrillation     Cataracts, bilateral     Chest pain, atypical     Colon polyp     Coronary artery disease     Diabetes mellitus     Dry eyes     Esophageal erosions     GERD (gastroesophageal reflux disease)     Heart murmur     Hemorrhoid     High cholesterol     Hypertension     Irritable bowel syndrome     Shingles 2015    TIA (transient ischemic attack)     Use of cane as ambulatory aid        PAST SURGICAL HISTORY:  Past Surgical History:   Procedure Laterality Date    ABDOMINAL SURGERY      angiocele      2007, 2014    ANGIOGRAM-ABDOMINAL N/A 5/5/2015    [facility] [address] [place] [address]    ANGIOGRAM-ABDOMINAL SMA stent Left Brachial access. Right arm over  head. Left 4/29/2015    [facility]    ANGIOGRAM-ABDOMINAL-INTESTINAL - Mesenteric angiogram Right CFA access, possible left arm access  Right 1/5/2016    Performed by [facility] at [facility] OR [address] [place] [address]    ANGIOGRAM-MESENTERIC  1/5/2016    Performed by [facility] at [facility] OR [address] [place] [address]    BREAST SURGERY      left--- a lump--- no cancer    COLONOSCOPY  2014    COLONOSCOPY N/A 3/14/2018    [facility]formed [facility]emp [facility] at [facility] ENDO (4TH FLR)    ESOPHAGOGASTRODUODENOSCOPY (EGD) N/A 2/22/2016    Performed by [facility] at [facility] ENDO (4TH FLR)    EXPLORATION-BLEEDING (RE-EXPLORATION) Left 5/7/2015    Performed by [facility] at [facility] OR [address] [place] [address]    HERNIA REPAIR      HYSTERECTOMY  partial    1982 partial hysterectomy    left nasal polyp      left neck lymph node      nose polyp      PLACEMENT-STENT Right 1/5/2016    Performed by [facility] at [facility] OR [address] [place] [address]    REPAIR-HERNIA-LAPAROSCOPIC-INCISIONAL w/ mesh N/A 6/12/2017    [facility]    right hip fatty mass tissue      stent to small intestine      SUPERIOR VENA CAVA ANGIOPLASTY / STENTING      THROMBECTOMY - left brachial artery Left 5/5/2015    Performed by [facility] at [facility] OR [address] [place] [address]    TONSILLECTOMY      TOTAL ABDOMINAL HYSTERECTOMY      2014    TOTAL KNEE ARTHROPLASTY Bilateral     UPPER GASTROINTESTINAL ENDOSCOPY  2014       CURRENT MEDS:  Current Outpatient Medications   Medication Sig Dispense Refill    acetaminophen (TYLENOL ARTHRITIS) 650 MG TbSR Take 1,300 mg by mouth 2 (two) times daily.      albuterol (ACCUNEB) 1.25 mg/3 mL Nebu Take scheduled q4 for the next two days while at home then resume prn dosing 3 mL 1    albuterol (VENTOLIN HFA) 90 mcg/actuation inhaler Inhale 2 puffs into the lungs every 6 (six) hours as needed for Wheezing. Rescue      apixaban 5 mg Tab Take 1 tablet (5 mg total) by mouth 2 (two) times  daily. 180 tablet 3    ascorbic acid (VITAMIN C) 500 MG tablet Take 500 mg by mouth once daily.      azelastine (ASTELIN) 137 mcg nasal spray 1 spray by Nasal route 2 (two) times daily.      budesonide-formoterol 160-4.5 mcg (SYMBICORT) 160-4.5 mcg/actuation HFAA Inhale 2 puffs into the lungs every 12 (twelve) hours. Controller      calcium carbonate (OS-MICHAEL) 600 mg (1,500 mg) Tab Take 1,200 mg by mouth once daily.      clopidogrel (PLAVIX) 75 mg tablet Take 1 tablet (75 mg total) by mouth once daily. 90 tablet 4    cyanocobalamin (VITAMIN B-12) 1000 MCG tablet Take 100 mcg by mouth once daily.      digoxin (LANOXIN) 125 mcg tablet Take 1 tablet (125 mcg total) by mouth once daily. 90 tablet 3    diltiaZEM (CARDIZEM CD) 180 MG 24 hr capsule Take 1 capsule (180 mg total) by mouth once daily. 90 capsule 3    docusate sodium (COLACE) 100 MG capsule Take 1 capsule (100 mg total) by mouth 2 (two) times daily. 60 capsule 1    fexofenadine (ALLEGRA) 60 MG tablet Take 60 mg by mouth every morning.      fish oil-omega-3 fatty acids 300-1,000 mg capsule Take 1 g by mouth once daily.       fluticasone (FLONASE) 50 mcg/actuation nasal spray 1 spray by Each Nare route every morning.       furosemide (LASIX) 40 MG tablet Take 1 tablet (40 mg total) by mouth once daily. 90 tablet 3    gabapentin (NEURONTIN) 300 MG capsule Take 4 capsules (1,200 mg total) by mouth 3 (three) times daily. 360 capsule 3    lisinopril (PRINIVIL,ZESTRIL) 20 MG tablet Take 1 tablet (20 mg total) by mouth once daily. 90 tablet 3    lorazepam (ATIVAN) 0.5 MG tablet Take 0.5 mg by mouth every morning.       magnesium 250 mg Tab Take 250 mg by mouth. 3 in am and 3 pm      ondansetron (ZOFRAN-ODT) 4 MG TbDL DISSOLVE 1 TABLET ON TONGUE EVERY 8 HOURS AS NEEDED FOR NAUSEA  1    oxybutynin (DITROPAN-XL) 5 MG TR24 Take 5 mg by mouth every evening.       pantoprazole (PROTONIX) 40 MG tablet Take 1 tablet (40 mg total) by mouth once daily.  "(Patient taking differently: Take 40 mg by mouth every morning. ) 90 tablet 3    polyethylene glycol (GLYCOLAX) 17 gram PwPk Take 17 g by mouth once daily.      POLYSORBATE 80/GLYCERIN (REFRESH DRY EYE THERAPY OPHT) Apply to eye 2 (two) times daily.      potassium gluconate 550 mg (90 mg) Tab Take by mouth. 2 in am and 2 pm      psyllium (METAMUCIL) powder Take 1 packet by mouth 3 (three) times daily.      traMADol (ULTRAM) 50 mg tablet Take 1 tablet (50 mg total) by mouth every 6 (six) hours as needed for Pain. 20 tablet 0    zafirlukast (ACCOLATE) 20 MG tablet Take 20 mg by mouth once daily.  3    DULoxetine (CYMBALTA) 20 MG capsule Take 1 capsule (20 mg total) by mouth once daily. 90 capsule 3     No current facility-administered medications for this visit.        ALLERGIES:  Review of patient's allergies indicates:   Allergen Reactions    Celebrex [celecoxib] Shortness Of Breath    Ciprofloxacin Swelling     lip    Dicyclomine Hives    Adhesive Dermatitis    Avelox [moxifloxacin] Swelling    Cilostazol Other (See Comments)     Elevates blood pressure    Eryc [erythromycin] Other (See Comments)     unknown    Iodine and iodide containing products Hives    Keflex [cephalexin] Hives    Meclomen      rashes    Meloxicam      Ear ringing    Metoclopramide Other (See Comments)     High blood pressure    Sulfa (sulfonamide antibiotics) Itching       FAMILY HISTORY:  Family History   Problem Relation Age of Onset    Colon cancer Neg Hx     Inflammatory bowel disease Neg Hx        SOCIAL HISTORY:  Social History     Tobacco Use    Smoking status: Never Smoker    Smokeless tobacco: Never Used   Substance Use Topics    Alcohol use: No    Drug use: No       Review of Systems:  12 system review of systems is negative except for the symptoms mentioned in HPI.      Objective:     Vitals:    05/14/19 0827   BP: 123/77   Pulse: 82   Weight: 104.4 kg (230 lb 2.6 oz)   Height: 5' 4" (1.626 m)     General: " NAD, well nourished   Eyes: no tearing, discharge, no erythema   ENT: moist mucous membranes of the oral cavity, nares patent    Neck: Supple, full range of motion  Cardiovascular: Warm and well perfused, pulses equal and symmetrical  Lungs: Normal work of breathing, normal chest wall excursions  Skin: No rash, lesions, or breakdown on exposed skin  Psychiatry: Mood and affect are appropriate   Abdomen: soft, non tender, non distended  Extremeties: No cyanosis, clubbing or edema.    Neurological   MENTAL STATUS: Alert and oriented to person, place, and time. Attention and concentration within normal limits. Speech without dysarthria, able to name and repeat without difficulty. Recent and remote memory within normal limits   CRANIAL NERVES: Visual fields intact. PERRL. EOMI. Facial sensation intact. Face symmetrical. Hearing grossly intact. Full shoulder shrug bilaterally. Tongue protrudes midline   SENSORY: Sensation is reduced to LT, ST, temp, and vibration to knees  Joint position perception reduced bilaterally. Positive Romberg.   MOTOR: Normal bulk and tone. 5/5 deltoid, biceps, triceps, interosseous, hand  bilaterally. 4/5 iliopsoas, knee extension/flexion, foot dorsi/plantarflexion bilaterally.    REFLEXES: Symmetric and 1+ throughout. Toes down going bilaterally.   CEREBELLAR/COORDINATION/GAIT: Gait wide based and unsteady. Finger to nose intact. Normal rapid alternating movements.

## 2019-05-14 NOTE — PATIENT INSTRUCTIONS
Magnetic Resonance Imaging (MRI)     You will be asked to hold very still during the scan.     Magnetic resonance imaging (MRI) is a test that lets your doctor see detailed pictures of the inside of your body. MRI combines the use of strong magnets and radio waves to form an MRI image.  How do I get ready for an MRI?  · Follow any directions you are given for not eating or drinking before the test.  · Ask your provider if you should stop taking any medicine before the test.  · Follow your normal daily routine unless your provider tells you otherwise.  · You'll be asked to remove your watch, jewelry, hearing aids, credit cards, pens, pocket knives, eyeglasses, and other metal objects.  · You may be asked to remove your makeup. Makeup may contain some metal.  · Most MRI tests take 30 to 60 minutes. Depending on the type of MRI you are having, the test may take longer. Give yourself extra time to check in.     MRI uses strong magnets. Metal is affected by magnets and can distort the image. The magnet used in MRI can cause metal objects in your body to move. If you have a metal implant, you may not be able to have an MRI unless the implant is certified as MRI safe. People with these implants should not have an MRI:  · Ear (cochlear) implants  · Certain clips used for brain aneurysms  · Certain metal coils put in blood vessels  · Most defibrillators  · Most pacemakers  Be sure to tell the radiologist or technologist if you:  · Have had any previous surgeries  · Have a pacemaker, surgical clips, metal plate or pins, an artificial joint, staples or screws, ear (cochlear) implants, or other implants  · Wear a medicated adhesive patch  · Have metal splinters in your body  · Have implanted nerve stimulators or drug-infusion ports  · Have tattoos or body piercings. Some tattoo inks contain metal.  · Work with metal  · Have braces. You must remove any dental work.  · Have a bullet or other metal in your body  Also tell the  radiologist or technologist if you:  · Are pregnant or think you may be  · Are afraid of small, enclosed spaces (claustrophobic)  · Are allergic to X-ray dye (contrast medium), iodine, shellfish, or any medicines  · Have other allergies  · Are breastfeeding  · Have a history of cancer  · Have any serious health problems. This includes kidney disease or a liver transplant. You may not be able to have the contrast material used for MRI.   What happens during an MRI?  · You may be asked to wear a hospital gown.  · You may be given earplugs to wear if you need them.  · You may be injected with a special dye (contrast) that improves the MRI image.   · Youll lie down on a platform that slides into the magnet.  What happens after an MRI?  · You can get back to normal activities right away. If you were given contrast, it will pass naturally through your body within a day. You may be told to drink more water or other fluids during this time.   · Your doctor will discuss the test results with you during a follow-up appointment or over the phone.  · Your next appointment is: __________________  Date Last Reviewed: 6/2/2015  © 8435-8773 [employer]. [address] [place] [address], [place] [address]. All rights reserved. This information is not intended as a substitute for professional medical care. Always follow your healthcare professional's instructions.        Magnetic Resonance Imaging (MRI)     You will be asked to hold very still during the scan.     Magnetic resonance imaging (MRI) is a test that lets your doctor see detailed pictures of the inside of your body. MRI combines the use of strong magnets and radio waves to form an MRI image.  How do I get ready for an MRI?  · Follow any directions you are given for not eating or drinking before the test.  · Ask your provider if you should stop taking any medicine before the test.  · Follow your normal daily routine unless your provider tells you otherwise.  · You'll  be asked to remove your watch, jewelry, hearing aids, credit cards, pens, pocket knives, eyeglasses, and other metal objects.  · You may be asked to remove your makeup. Makeup may contain some metal.  · Most MRI tests take 30 to 60 minutes. Depending on the type of MRI you are having, the test may take longer. Give yourself extra time to check in.     MRI uses strong magnets. Metal is affected by magnets and can distort the image. The magnet used in MRI can cause metal objects in your body to move. If you have a metal implant, you may not be able to have an MRI unless the implant is certified as MRI safe. People with these implants should not have an MRI:  · Ear (cochlear) implants  · Certain clips used for brain aneurysms  · Certain metal coils put in blood vessels  · Most defibrillators  · Most pacemakers  Be sure to tell the radiologist or technologist if you:  · Have had any previous surgeries  · Have a pacemaker, surgical clips, metal plate or pins, an artificial joint, staples or screws, ear (cochlear) implants, or other implants  · Wear a medicated adhesive patch  · Have metal splinters in your body  · Have implanted nerve stimulators or drug-infusion ports  · Have tattoos or body piercings. Some tattoo inks contain metal.  · Work with metal  · Have braces. You must remove any dental work.  · Have a bullet or other metal in your body  Also tell the radiologist or technologist if you:  · Are pregnant or think you may be  · Are afraid of small, enclosed spaces (claustrophobic)  · Are allergic to X-ray dye (contrast medium), iodine, shellfish, or any medicines  · Have other allergies  · Are breastfeeding  · Have a history of cancer  · Have any serious health problems. This includes kidney disease or a liver transplant. You may not be able to have the contrast material used for MRI.   What happens during an MRI?  · You may be asked to wear a hospital gown.  · You may be given earplugs to wear if you need  them.  · You may be injected with a special dye (contrast) that improves the MRI image.   · Youll lie down on a platform that slides into the magnet.  What happens after an MRI?  · You can get back to normal activities right away. If you were given contrast, it will pass naturally through your body within a day. You may be told to drink more water or other fluids during this time.   · Your doctor will discuss the test results with you during a follow-up appointment or over the phone.  · Your next appointment is: __________________  Date Last Reviewed: 6/2/2015  © 1017-1727 [employer]. [address] [place] [address], [place] [address]. All rights reserved. This information is not intended as a substitute for professional medical care. Always follow your healthcare professional's instructions.

## 2019-05-14 NOTE — ASSESSMENT & PLAN NOTE
-- obtaining updated MRI L spine, anticipate likely referral to pain management and PT  -- may require EMG/NCS pending results

## 2019-05-22 ENCOUNTER — HOSPITAL ENCOUNTER (OUTPATIENT)
Dept: RADIOLOGY | Facility: HOSPITAL | Age: 74
Discharge: HOME | End: 2019-05-22
Attending: PSYCHIATRY & NEUROLOGY
Payer: MEDICARE

## 2019-05-22 DIAGNOSIS — R29.818 NEUROGENIC CLAUDICATION: ICD-10-CM

## 2019-05-22 DIAGNOSIS — M48.062 SPINAL STENOSIS OF LUMBAR REGION WITH NEUROGENIC CLAUDICATION: Primary | ICD-10-CM

## 2019-05-22 PROCEDURE — 72148 MRI LUMBAR SPINE W/O DYE: CPT | Mod: TC,PO

## 2019-06-06 ENCOUNTER — CLINICAL SUPPORT (OUTPATIENT)
Dept: REHABILITATION | Facility: HOSPITAL | Age: 74
End: 2019-06-06
Attending: PSYCHIATRY & NEUROLOGY
Payer: MEDICARE

## 2019-06-06 DIAGNOSIS — M62.81 MUSCLE WEAKNESS: ICD-10-CM

## 2019-06-06 DIAGNOSIS — R26.89 BALANCE PROBLEMS: ICD-10-CM

## 2019-06-06 DIAGNOSIS — M54.50 ACUTE BILATERAL LOW BACK PAIN WITHOUT SCIATICA: ICD-10-CM

## 2019-06-06 DIAGNOSIS — R26.2 DIFFICULTY WALKING: ICD-10-CM

## 2019-06-06 PROCEDURE — 97163 PT EVAL HIGH COMPLEX 45 MIN: CPT | Mod: PO

## 2019-06-06 NOTE — PLAN OF CARE
[facility]  Physical Therapy Initial Evaluation    Name: Adriana Strong  Clinic Number: [id number]    Therapy Diagnosis:   Encounter Diagnoses   Name Primary?    Acute bilateral low back pain without sciatica     Difficulty walking     Muscle weakness     Balance problems      Physician: Sean Saravia MD    Physician Orders: PT Eval and Treat   Medical Diagnosis from Referral: M48.062 (ICD-10-CM) - Spinal stenosis of lumbar region with neurogenic claudication  Evaluation Date: 6/6/2019  Authorization Period Expiration: 5/21/20  Plan of Care Expiration: 8/6/19  Visit # / Visits authorized: 1/ 1    Time In: 10:00am  Time Out: 11:00am  Total Billable Time: 60 minutes    Precautions: Standard and Fall    Subjective   Date of onset: 5/22/19  History of current condition - Adriana reports: her neuropathy has gotten worse since February, since then MD changed medications and referred her to physical therapy.  Pt had recent MRI that revealed multiple pinched nerves in lumbar spine. She is having increased difficulty standing up straight, bending over, decreased balance, causing difficulty with walking and performing ADL's.     Medical History:   Past Medical History:   Diagnosis Date    Anticoagulant long-term use     on Plavix since May 2015    Anxiety     Arthritis     Asthma     Atrial fibrillation     Cataracts, bilateral     Chest pain, atypical     Colon polyp     Coronary artery disease     Diabetes mellitus     Dry eyes     Esophageal erosions     GERD (gastroesophageal reflux disease)     Heart murmur     Hemorrhoid     High cholesterol     Hypertension     Irritable bowel syndrome     Shingles 2015    TIA (transient ischemic attack)     Use of cane as ambulatory aid        Surgical History:   Adriana Strong  has a past surgical history that includes Total knee arthroplasty (Bilateral); nose polyp; Breast surgery; Tonsillectomy; left nasal polyp; Hysterectomy  (partial); Total abdominal hysterectomy; left neck lymph node; right hip fatty mass tissue; Upper gastrointestinal endoscopy (2014); Colonoscopy (2014); Abdominal surgery; stent to small intestine; angiocele; Superior vena cava angioplasty / stenting; Hernia repair; and Colonoscopy (N/A, 3/14/2018).    Medications:   Adriana has a current medication list which includes the following prescription(s): acetaminophen, albuterol, albuterol, apixaban, ascorbic acid (vitamin c), azelastine, budesonide-formoterol 160-4.5 mcg, calcium carbonate, clopidogrel, cyanocobalamin, digoxin, diltiazem, docusate sodium, duloxetine, fexofenadine, fish oil-omega-3 fatty acids, fluticasone propionate, furosemide, gabapentin, lisinopril, lorazepam, magnesium, ondansetron, oxybutynin, pantoprazole, polyethylene glycol, polysorbate 80/glycerin, potassium gluconate, psyllium, tramadol, and zafirlukast.    Allergies:   Review of patient's allergies indicates:   Allergen Reactions    Celebrex [celecoxib] Shortness Of Breath    Ciprofloxacin Swelling     lip    Dicyclomine Hives    Adhesive Dermatitis    Avelox [moxifloxacin] Swelling    Cilostazol Other (See Comments)     Elevates blood pressure    Eryc [erythromycin] Other (See Comments)     unknown    Iodine and iodide containing products Hives    Keflex [cephalexin] Hives    Meclomen      rashes    Meloxicam      Ear ringing    Metoclopramide Other (See Comments)     High blood pressure    Sulfa (sulfonamide antibiotics) Itching        Imaging, MRI studies:   Impression       Disc space narrowing at the L1-2, L2-3, L3-4, L4-5, and L5-S1 levels.  Bony neural foraminal narrowing on the right side at the L4-L5 level with possible right-sided L4 nerve root impingement.  Multilevel neural foraminal narrowings on the left side without evidence of definite nerve root impingement.  Multilevel facet arthropathy.       Prior Therapy: not for back  Social History:  lives with their  spouse, two story house, but she is staying downstairs, shower chair and grab bar in tub  Occupation: housewife  Prior Level of Function: independent with ADL's  Current Level of Function: using cane, difficulty with transfers, lifting, bending, and ADL's    Pain:  Current 6/10, worst 10/10, best 6/10   Location: bilateral ankles, back , feet , knee  and lower legs  Description: Aching, Dull, Burning and Numb  Aggravating Factors: Sitting, Standing, Bending, Walking and Lifting  Easing Factors: pain medication    Pts goals: decrease pain and improve mobility    Objective     Observation: ambulating with standard cane, wide base of support, decreased stride length.  Slow and painful transfers.  Decreased standing balance    Posture:  Forward flexed posture    Lumbar Range of Motion:    Degrees Pain   Flexion Reaches to patella   Increased low back pain        Extension Not tested   Not tested        Left Side Bending Reaches to mid thigh Increased low back pain        Right Side Bending Reaches to mid thigh Increased low back pain        Left rotation   Not tested  Not tested        Right Rotation   Not tested Not tested             Lower Extremity Strength  Right LE  Left LE    Knee extension: 3+/5 Knee extension: 3+/5   Knee flexion: 3+/5 Knee flexion: 4-/5   Hip flexion: 3+/5 Hip flexion: 3+/5   Hip extension:  Not tested Hip extension: Not tested   Hip abduction: 3+/5 Hip abduction: 3+/5   Hip adduction: 3+/5 Hip adduction 3+/5   Ankle dorsiflexion: 4-/5 Ankle dorsiflexion: 4-/5   Ankle plantarflexion: 4-/5 Ankle plantarflexion: 4-/5       Special Tests:  --Bridge Test: unable to tolerate bridge  --positive Romberg and unable to perform Sharpened Romberg      DTR:   Right Left Comment   Patellar (L3-4) 1+ 1+    Achilles (S1) 1+ 1+        Neuro Dynamic Testing:    Sciatic nerve:      SLR: R = positive     L = positive       Femoral Nerve:    Femoral nerve test: not tested      Joint Mobility: unable to assess due  to patient's inability to tolerate position    Palpation: tightness and tenderness in gastroc, hamstrings, quads, glutes, lumbar paraspinals    Sensation: decreased sensation in feet    Flexibility:    Popliteal Angle: R = 40 degrees ; L = 40 degrees       CMS Impairment/Limitation/Restriction for FOTO Lumbar Survey    Therapist reviewed FOTO scores for Adriana Strong on 6/6/2019.   FOTO documents entered into EPIC - see Media section.    Limitation Score: 58%  Category: Mobility    Current : CK = at least 40% but < 60% impaired, limited or restricted  Goal: CK = at least 40% but < 60% impaired, limited or restricted  Discharge: not at this time         TREATMENT   Treatment Time In: 10:45am  Treatment Time Out: 11:00am  Total Treatment time separate from Evaluation: 15 minutes    Adriana received therapeutic exercises to develop strength, endurance, ROM, flexibility, posture and core stabilization for 15 minutes including:  Calf stretch, LAQ, seated TA with march, seated ankle dorsiflexion and plantarflexion    Home Exercises and Patient Education Provided    Education provided:   - pt instructed to perform HEP per her tolerance and take frequent rest breaks      Written Home Exercises Provided: yes.  Exercises were reviewed and Adriana was able to demonstrate them prior to the end of the session.  Adriana demonstrated good  understanding of the education provided.     See EMR under Patient Instructions for exercises provided 6/6/2019.    Assessment   Adriana is a 74 y.o. female referred to outpatient Physical Therapy with a medical diagnosis of spinal stenosis of lumbar region with neurogenic claudication. Pt presents with signs and symptoms consistent with the diagnosis.  She stands with forward flexed posture, ambulates with standard cane, wide base of support, and noted to have decreased standing balance making her a fall risk.  Pt also has limited LROM, decreased strength, decreased endurance, and  decreased flexibility.  She has limited tolerance to to examination due to medical history and requires frequent rest breaks.  Patient would benefit from therapy to improve strength, stability, balance, and gait in order to improve independence and safety during ADL's.    Pt prognosis is Fair.   Pt will benefit from skilled outpatient Physical Therapy to address the deficits stated above and in the chart below, provide pt/family education, and to maximize pt's level of independence.     Plan of care discussed with patient: Yes  Pt's spiritual, cultural and educational needs considered and patient is agreeable to the plan of care and goals as stated below:     Anticipated Barriers for therapy: extensive medical history limiting tolerance to exercise    Medical Necessity is demonstrated by the following  History  Co-morbidities and personal factors that may impact the plan of care Co-morbidities:   advanced age, COPD/asthma, diabetes, high BMI, history of CVA, HTN and moderate aortic stenosis    Personal Factors:   age     high   Examination  Body Structures and Functions, activity limitations and participation restrictions that may impact the plan of care Body Regions:   back  lower extremities    Body Systems:    ROM  strength  balance  gait  transfers  heart rate  respiratory rate  blood pressure    Participation Restrictions:   health    Activity limitations:   Learning and applying knowledge  no deficits    General Tasks and Commands  no deficits    Communication  no deficits    Mobility  lifting and carrying objects  walking  driving (bike, car, motorcycle)    Self care  dressing    Domestic Life  shopping  cooking  doing house work (cleaning house, washing dishes, laundry)  assisting others    Interactions/Relationships  no deficits    Life Areas  no deficits    Community and Social Life  no deficits         high   Clinical Presentation evolving clinical presentation with changing clinical characteristics high    Decision Making/ Complexity Score: high     Goals:  Short Term Goals: 4 weeks  1. Patient will be compliant with HEP to promote the independent management of current diagnosis.  2. Patient will increase lumbar forward bending to reach mid shin level for function of dressing.  3. Patient will improve bilateral hamstring flexibility to (30)deg during 90/90 testing.  4. Patient will report a decrease in complaints of low back pain from 10/10 to 6/10 during ADLs.      Long Term Goals:  8 weeks  1. Patient will improve core stabilization strength to Good to improve tolerance to normal house work activities for self care independence.  2. Patient will increase standing balance to Good.  3. Patient will report a decrease in complaints of low back pain from 10/10 to 4/10 during ADLs.  4. Patient will improve FOTO limitation status from 58% to 45% placing the patient in the stage 3 impaired, limited, or restricted category indicating increased functional mobility.      Plan   Plan of care Certification: 6/6/2019 to 8/6/19.    Outpatient Physical Therapy 2 times weekly for 8 weeks to include the following interventions:  Therapeutic exercises to increase ROM, strength and stabilization; joint and soft tissue mobilization with manual therapy techniques to decrease muscle tightness, pain and improve joint mobility; neuromuscular re-education to improve posture, coordination, kinesthetic sense and proprioception, therapeutic activities to improve coordination, strength and function, modalities such as moist heat, ice, ultrasound and electrical stimulation to increase circulation, decrease pain and inflammation;  will be considered and utilized as needed.   Pt may be seen by PTA to carry out plan of care as part of Rehab team.        Krzysztof Russell, PT

## 2019-06-06 NOTE — PATIENT INSTRUCTIONS
Ankle Bend: Dorsiflexion / Plantar Flexion, Sitting        Sit with feet on floor. Point toes up, keeping both heels on floor. Then press toes to floor raising heels. Hold each position _2__ seconds.  Repeat __10_ times per session. Do _2__ sessions per day.    Copyright © [employer]. All rights reserved.   Gastroc, Sitting (Passive)        Sit with strap or towel around ball of foot. Gently pull toward body. Hold ___10 seconds.   Repeat __5_ times per session. Do __2_ sessions per day.    Copyright © I. All rights reserved.   Supine Pelvic Tilt (Active)        While lying on back with knees bent, pull abdomen in and up and flatten back. Hold __2_ seconds. Relax.  Complete __1_ sets of _10__ repetitions. Perform 2___ sessions per day.    Copyright © [employer]. All rights reserved.   FLEXION: Sitting (Active)        Sit, both feet flat. Lift right knee toward ceiling. Use _0__ lbs.  Complete 1___ sets of __10_ repetitions. Perform _2__ sessions per day.    Copyright © [employer]. All rights reserved.   EXTENSION: Sitting (Active)        Sit with feet flat. Straighten right knee. Use 0___ lbs.  Complete __1_ sets of __10_ repetitions. Perform _2__ sessions per day.     [email or web address]://gtsc.[employer].us/268     Copyright © I. All rights reserved.

## 2019-06-10 ENCOUNTER — CLINICAL SUPPORT (OUTPATIENT)
Dept: REHABILITATION | Facility: HOSPITAL | Age: 74
End: 2019-06-10
Attending: PSYCHIATRY & NEUROLOGY
Payer: MEDICARE

## 2019-06-10 ENCOUNTER — OFFICE VISIT (OUTPATIENT)
Dept: PAIN MEDICINE | Facility: CLINIC | Age: 74
End: 2019-06-10
Payer: MEDICARE

## 2019-06-10 VITALS
SYSTOLIC BLOOD PRESSURE: 157 MMHG | DIASTOLIC BLOOD PRESSURE: 90 MMHG | BODY MASS INDEX: 39.27 KG/M2 | WEIGHT: 230 LBS | HEIGHT: 64 IN | HEART RATE: 85 BPM

## 2019-06-10 DIAGNOSIS — G89.29 CHRONIC BILATERAL LOW BACK PAIN WITH BILATERAL SCIATICA: Primary | ICD-10-CM

## 2019-06-10 DIAGNOSIS — M62.81 MUSCLE WEAKNESS: ICD-10-CM

## 2019-06-10 DIAGNOSIS — R26.2 DIFFICULTY WALKING: ICD-10-CM

## 2019-06-10 DIAGNOSIS — M47.816 LUMBAR SPONDYLOSIS: ICD-10-CM

## 2019-06-10 DIAGNOSIS — R26.89 BALANCE PROBLEMS: ICD-10-CM

## 2019-06-10 DIAGNOSIS — M54.41 CHRONIC BILATERAL LOW BACK PAIN WITH BILATERAL SCIATICA: Primary | ICD-10-CM

## 2019-06-10 DIAGNOSIS — M54.50 ACUTE BILATERAL LOW BACK PAIN WITHOUT SCIATICA: ICD-10-CM

## 2019-06-10 DIAGNOSIS — M54.42 CHRONIC BILATERAL LOW BACK PAIN WITH BILATERAL SCIATICA: Primary | ICD-10-CM

## 2019-06-10 DIAGNOSIS — M25.69 DECREASED RANGE OF MOTION OF TRUNK AND BACK: ICD-10-CM

## 2019-06-10 DIAGNOSIS — M51.36 DDD (DEGENERATIVE DISC DISEASE), LUMBAR: ICD-10-CM

## 2019-06-10 DIAGNOSIS — M48.061 NEUROFORAMINAL STENOSIS OF LUMBAR SPINE: ICD-10-CM

## 2019-06-10 DIAGNOSIS — M48.062 SPINAL STENOSIS OF LUMBAR REGION WITH NEUROGENIC CLAUDICATION: ICD-10-CM

## 2019-06-10 PROCEDURE — 99999 PR PBB SHADOW E&M-EST. PATIENT-LVL III: ICD-10-PCS | Mod: PBBFAC,,, | Performed by: PAIN MEDICINE

## 2019-06-10 PROCEDURE — 99214 OFFICE O/P EST MOD 30 MIN: CPT | Mod: S$GLB,,, | Performed by: PAIN MEDICINE

## 2019-06-10 PROCEDURE — 1101F PT FALLS ASSESS-DOCD LE1/YR: CPT | Mod: CPTII,S$GLB,, | Performed by: PAIN MEDICINE

## 2019-06-10 PROCEDURE — 99214 PR OFFICE/OUTPT VISIT, EST, LEVL IV, 30-39 MIN: ICD-10-PCS | Mod: S$GLB,,, | Performed by: PAIN MEDICINE

## 2019-06-10 PROCEDURE — 3080F DIAST BP >= 90 MM HG: CPT | Mod: CPTII,S$GLB,, | Performed by: PAIN MEDICINE

## 2019-06-10 PROCEDURE — 3077F PR MOST RECENT SYSTOLIC BLOOD PRESSURE >= 140 MM HG: ICD-10-PCS | Mod: CPTII,S$GLB,, | Performed by: PAIN MEDICINE

## 2019-06-10 PROCEDURE — 99999 PR PBB SHADOW E&M-EST. PATIENT-LVL III: CPT | Mod: PBBFAC,,, | Performed by: PAIN MEDICINE

## 2019-06-10 PROCEDURE — 97110 THERAPEUTIC EXERCISES: CPT | Mod: PO

## 2019-06-10 PROCEDURE — 3077F SYST BP >= 140 MM HG: CPT | Mod: CPTII,S$GLB,, | Performed by: PAIN MEDICINE

## 2019-06-10 PROCEDURE — 3080F PR MOST RECENT DIASTOLIC BLOOD PRESSURE >= 90 MM HG: ICD-10-PCS | Mod: CPTII,S$GLB,, | Performed by: PAIN MEDICINE

## 2019-06-10 PROCEDURE — 1101F PR PT FALLS ASSESS DOC 0-1 FALLS W/OUT INJ PAST YR: ICD-10-PCS | Mod: CPTII,S$GLB,, | Performed by: PAIN MEDICINE

## 2019-06-10 NOTE — PROGRESS NOTES
"  Physical Therapy Daily Treatment Note     Name: Adriana Freeman  Clinic Number: [id number]    Therapy Diagnosis:   Encounter Diagnoses   Name Primary?    Acute bilateral low back pain without sciatica     Difficulty walking     Muscle weakness     Balance problems      Physician: Sean Saravia MD    Visit Date: 6/10/2019    Physician Orders: PT Eval and Treat   Medical Diagnosis from Referral: M48.062 (ICD-10-CM) - Spinal stenosis of lumbar region with neurogenic claudication  Evaluation Date: 6/6/2019  Authorization Period Expiration: 5/21/20  Plan of Care Expiration: 8/6/19  Visit # / Visits authorized: 2/pending    Time In: 1500  Time Out: 1550  Total Billable Time: 30 minutes    Precautions: Standard and Fall    Subjective     Pt reports: she had pain management doctor visit today.  She complains of constant pain across low back and numbness in feet.   She was compliant with home exercise program.  Response to previous treatment: muscle soreness  Functional change: no change at this time    Pain: 6/10  Location: bilateral back      Objective     Adriana received therapeutic exercises to develop strength, endurance, ROM, flexibility, posture and core stabilization for 45 minutes including:    Date  6/10/19   VISIT 2/pending   G CODE VISIT 2/10   POC EXP. DATE 8/6/19   VISIT AMOUNT  MEDICARE TOTAL ---   FACE-TO-FACE 7/6/19   FOTO 2/5       Recumbent bike 8 minutes   TABLE:    Quad sets 2 x10 x 3"   TA 2 x 10 x 3"   bridging 2 x 5   LTR 2 x 10               SEATED:    LAQ 2 x 10   TA  1 x 10   TA with march 2 x 10   STANDING:                    Initials YESSI Wright received the following manual therapy techniques: Manual Lymphatic Drainage were applied to the: lumbar and bilateral LE's for 10 minutes, including:  Manual stretching of bilateral hamstrings and piriformis  Manual single knee to chest      Home Exercises Provided and Patient Education Provided     Education provided:   - pt instructed on " proper breathing and frequent rest breaks during exercises    Written Home Exercises Provided: Patient instructed to cont prior HEP.  Exercises were reviewed and Adriana was able to demonstrate them prior to the end of the session.  Adriana demonstrated good  understanding of the education provided.     See EMR under Patient Instructions for exercises provided prior visit.    Assessment     Pt requires frequent rest breaks due to difficulty with breathing.  She has decreased endurance, decreased strength, and requires assistance to perform stretching exercises.  Pt will continue with exercise routine and progress as tolerated to improve functional mobility during ADL's.  Adriana is progressing well towards her goals.   Pt prognosis is Good.     Pt will continue to benefit from skilled outpatient physical therapy to address the deficits listed in the problem list box on initial evaluation, provide pt/family education and to maximize pt's level of independence in the home and community environment.     Pt's spiritual, cultural and educational needs considered and pt agreeable to plan of care and goals.     Anticipated barriers to physical therapy: none    Goals:   Short Term Goals: 4 weeks  1. Patient will be compliant with HEP to promote the independent management of current diagnosis.  2. Patient will increase lumbar forward bending to reach mid shin level for function of dressing.  3. Patient will improve bilateral hamstring flexibility to (30)deg during 90/90 testing.  4. Patient will report a decrease in complaints of low back pain from 10/10 to 6/10 during ADLs.        Long Term Goals:  8 weeks  1. Patient will improve core stabilization strength to Good to improve tolerance to normal house work activities for self care independence.  2. Patient will increase standing balance to Good.  3. Patient will report a decrease in complaints of low back pain from 10/10 to 4/10 during ADLs.  4. Patient will improve  FOTO limitation status from 58% to 45% placing the patient in the stage 3 impaired, limited, or restricted category indicating increased functional mobility.    Plan   Pt will continue with exercise protocol and progress as tolerated.     Krzysztof Russell, PT

## 2019-06-10 NOTE — PROGRESS NOTES
[facility] New Patient Evaluation    Referred by: Sean Saravia MD   Reason for referral: Spinal stenosis of lumbar region with neurogenic claudication     CC:   Chief Complaint   Patient presents with    Mid-back Pain     new patient     Last 3 PDI Scores 6/10/2019 5/17/2018   Pain Disability Index (PDI) 49 43       HPI:   Adriana Strong is a 74 y.o. female who complains of chronic low back pain radiating into the legs bilaterally.  She also complains of increasing numbness and tingling in her legs.  Numbness and tingling is worse with walking or standing, and improves slightly with sitting or lying in bed.  She reports being on 2 long-term anticoagulation medications- Plavix and Eliquis.    Location: low back   Onset: 6 months  Current Pain Score: 6/10  Daily Pain of Range: 5-6/10  Quality: Grabbing, Tight, Deep and Sharp  Radiation: behind the right leg  Worsened by: exercise, standing and walking for more than 10 minutes  Improved by: medications    Previous Therapies:  PT/OT:  Yes, previously  HEP:   Interventions:  Denies interventional procedures  Surgery:  Denies low back surgery  Medications:   - NSAIDS:   - MSK Relaxants:   - TCAs:   - SNRIs:   - Topicals:   - Anticonvulsants:  - Opioids:     Current Pain Medications:  1. Gabapentin 300 mg   2. Duloxetine  3. Tramadol    Review of Systems:  Review of Systems   Constitutional: Negative for chills and fever.   HENT: Negative for nosebleeds.    Eyes: Negative for pain.   Respiratory: Negative for hemoptysis.    Cardiovascular: Positive for leg swelling. Negative for chest pain.   Gastrointestinal: Negative for nausea and vomiting.   Genitourinary: Negative for dysuria.   Musculoskeletal: Positive for back pain, joint pain, myalgias and neck pain. Negative for falls.   Skin: Negative for rash.   Neurological: Positive for tingling and focal weakness (Legs).   Endo/Heme/Allergies: Bruises/bleeds easily ( due to blood thinning medications).    Psychiatric/Behavioral: The patient is nervous/anxious and has insomnia.        History:    Current Outpatient Medications:     acetaminophen (TYLENOL ARTHRITIS) 650 MG TbSR, Take 1,300 mg by mouth 2 (two) times daily., Disp: , Rfl:     albuterol (ACCUNEB) 1.25 mg/3 mL Nebu, Take scheduled q4 for the next two days while at home then resume prn dosing, Disp: 3 mL, Rfl: 1    albuterol (VENTOLIN HFA) 90 mcg/actuation inhaler, Inhale 2 puffs into the lungs every 6 (six) hours as needed for Wheezing. Rescue, Disp: , Rfl:     apixaban 5 mg Tab, Take 1 tablet (5 mg total) by mouth 2 (two) times daily., Disp: 180 tablet, Rfl: 3    ascorbic acid (VITAMIN C) 500 MG tablet, Take 500 mg by mouth once daily., Disp: , Rfl:     azelastine (ASTELIN) 137 mcg nasal spray, 1 spray by Nasal route 2 (two) times daily., Disp: , Rfl:     budesonide-formoterol 160-4.5 mcg (SYMBICORT) 160-4.5 mcg/actuation HFAA, Inhale 2 puffs into the lungs every 12 (twelve) hours. Controller, Disp: , Rfl:     calcium carbonate (OS-MICHAEL) 600 mg (1,500 mg) Tab, Take 1,200 mg by mouth once daily., Disp: , Rfl:     clopidogrel (PLAVIX) 75 mg tablet, Take 1 tablet (75 mg total) by mouth once daily., Disp: 90 tablet, Rfl: 4    cyanocobalamin (VITAMIN B-12) 1000 MCG tablet, Take 100 mcg by mouth once daily., Disp: , Rfl:     digoxin (LANOXIN) 125 mcg tablet, Take 1 tablet (125 mcg total) by mouth once daily., Disp: 90 tablet, Rfl: 3    diltiaZEM (CARDIZEM CD) 180 MG 24 hr capsule, Take 1 capsule (180 mg total) by mouth once daily., Disp: 90 capsule, Rfl: 3    docusate sodium (COLACE) 100 MG capsule, Take 1 capsule (100 mg total) by mouth 2 (two) times daily., Disp: 60 capsule, Rfl: 1    DULoxetine (CYMBALTA) 20 MG capsule, Take 1 capsule (20 mg total) by mouth once daily., Disp: 90 capsule, Rfl: 3    fexofenadine (ALLEGRA) 60 MG tablet, Take 60 mg by mouth every morning., Disp: , Rfl:     fish oil-omega-3 fatty acids 300-1,000 mg capsule, Take 1  g by mouth once daily. , Disp: , Rfl:     fluticasone (FLONASE) 50 mcg/actuation nasal spray, 1 spray by Each Nare route every morning. , Disp: , Rfl:     furosemide (LASIX) 40 MG tablet, Take 1 tablet (40 mg total) by mouth once daily., Disp: 90 tablet, Rfl: 3    gabapentin (NEURONTIN) 300 MG capsule, Take 4 capsules (1,200 mg total) by mouth 3 (three) times daily., Disp: 360 capsule, Rfl: 3    lisinopril (PRINIVIL,ZESTRIL) 20 MG tablet, Take 1 tablet (20 mg total) by mouth once daily., Disp: 90 tablet, Rfl: 3    lorazepam (ATIVAN) 0.5 MG tablet, Take 0.5 mg by mouth every morning. , Disp: , Rfl:     magnesium 250 mg Tab, Take 250 mg by mouth. 3 in am and 3 pm, Disp: , Rfl:     oxybutynin (DITROPAN-XL) 5 MG TR24, Take 5 mg by mouth every evening. , Disp: , Rfl:     pantoprazole (PROTONIX) 40 MG tablet, Take 1 tablet (40 mg total) by mouth once daily. (Patient taking differently: Take 40 mg by mouth every morning. ), Disp: 90 tablet, Rfl: 3    POLYSORBATE 80/GLYCERIN (REFRESH DRY EYE THERAPY OPHT), Apply to eye 2 (two) times daily., Disp: , Rfl:     potassium gluconate 550 mg (90 mg) Tab, Take by mouth. 2 in am and 2 pm, Disp: , Rfl:     psyllium (METAMUCIL) powder, Take 1 packet by mouth 3 (three) times daily., Disp: , Rfl:     traMADol (ULTRAM) 50 mg tablet, Take 1 tablet (50 mg total) by mouth every 6 (six) hours as needed for Pain., Disp: 20 tablet, Rfl: 0    zafirlukast (ACCOLATE) 20 MG tablet, Take 20 mg by mouth once daily., Disp: , Rfl: 3    ondansetron (ZOFRAN-ODT) 4 MG TbDL, DISSOLVE 1 TABLET ON TONGUE EVERY 8 HOURS AS NEEDED FOR NAUSEA, Disp: , Rfl: 1    polyethylene glycol (GLYCOLAX) 17 gram PwPk, Take 17 g by mouth once daily., Disp: , Rfl:     Past Medical History:   Diagnosis Date    Anticoagulant long-term use     on Plavix since May 2015    Anxiety     Arthritis     Asthma     Atrial fibrillation     Cataracts, bilateral     Chest pain, atypical     Colon polyp     Coronary  artery disease     Diabetes mellitus     Dry eyes     Esophageal erosions     GERD (gastroesophageal reflux disease)     Heart murmur     Hemorrhoid     High cholesterol     Hypertension     Irritable bowel syndrome     Shingles 2015    TIA (transient ischemic attack)     Use of cane as ambulatory aid        Past Surgical History:   Procedure Laterality Date    ABDOMINAL SURGERY      angiocele      2007, 2014    ANGIOGRAM-ABDOMINAL N/A 5/5/2015    Performed by [facility] Hernandez MD at [facility] OR [address] [place] [address]    ANGIOGRAM-ABDOMINAL SMA stent Left Brachial access. Right arm over head. Left 4/29/2015    Performed by [facility] Hernandez MD at [facility] OR [address] [place] [address]    ANGIOGRAM-ABDOMINAL-INTESTINAL - Mesenteric angiogram Right CFA access, possible left arm access  Right 1/5/2016    Performed by [facility] MD at [facility] OR [address] [place] [address]    ANGIOGRAM-MESENTERIC  1/5/2016    Performed by [facility] MD at [facility] OR [address] [place] [address]    BREAST SURGERY      left--- a lump--- no cancer    COLONOSCOPY  2014    COLONOSCOPY N/A 3/14/2018    Performed by Efren Kemp MD at [facility] ENDO (4TH FLR)    ESOPHAGOGASTRODUODENOSCOPY (EGD) N/A 2/22/2016    Performed by Efren Kemp MD at [facility] ENDO (4TH FLR)    EXPLORATION-BLEEDING (RE-EXPLORATION) Left 5/7/2015    Performed by [facility] MD at [facility] OR [address] [place] [address]    HERNIA REPAIR      HYSTERECTOMY  partial    1982 partial hysterectomy    left nasal polyp      left neck lymph node      nose polyp      PLACEMENT-STENT Right 1/5/2016    Performed by [facility] Hernandez MD at [facility] OR [address] [place] [address]    REPAIR-HERNIA-LAPAROSCOPIC-INCISIONAL w/ mesh N/A 6/12/2017    Performed by Jay Milligan MD at [facility] OR [address] [place] [address]    right hip fatty mass tissue      stent to small intestine      SUPERIOR VENA CAVA ANGIOPLASTY / STENTING      THROMBECTOMY - left brachial artery Left 5/5/2015    Performed by [facility] Hernandez MD at [facility] OR [address] [place] [address]    TONSILLECTOMY      TOTAL ABDOMINAL HYSTERECTOMY      2014     TOTAL KNEE ARTHROPLASTY Bilateral     UPPER GASTROINTESTINAL ENDOSCOPY  2014       Family History   Problem Relation Age of Onset    Colon cancer Neg Hx     Inflammatory bowel disease Neg Hx        Social History     Socioeconomic History    Marital status:      Spouse name: Not on file    Number of children: Not on file    Years of education: Not on file    Highest education level: Not on file   Occupational History    Not on file   Social Needs    Financial resource strain: Not on file    Food insecurity:     Worry: Not on file     Inability: Not on file    Transportation needs:     Medical: Not on file     Non-medical: Not on file   Tobacco Use    Smoking status: Never Smoker    Smokeless tobacco: Never Used   Substance and Sexual Activity    Alcohol use: No    Drug use: No    Sexual activity: Not on file   Lifestyle    Physical activity:     Days per week: Not on file     Minutes per session: Not on file    Stress: Not on file   Relationships    Social connections:     Talks on phone: Not on file     Gets together: Not on file     Attends [employer] service: Not on file     Active member of club or organization: Not on file     Attends meetings of clubs or organizations: Not on file     Relationship status: Not on file   Other Topics Concern    Not on file   Social History Narrative    Not on file       Review of patient's allergies indicates:   Allergen Reactions    Celebrex [celecoxib] Shortness Of Breath    Ciprofloxacin Swelling     lip    Dicyclomine Hives    Adhesive Dermatitis    Avelox [moxifloxacin] Swelling    Cilostazol Other (See Comments)     Elevates blood pressure    Eryc [erythromycin] Other (See Comments)     unknown    Iodine and iodide containing products Hives    Keflex [cephalexin] Hives    Meclomen      rashes    Meloxicam      Ear ringing    Metoclopramide Other (See Comments)     High blood pressure    Sulfa (sulfonamide antibiotics) Itching  "      Physical Exam:  Vitals:    06/10/19 0953   BP: (!) 157/90   Pulse: 85   Weight: 104.3 kg (230 lb)   Height: 5' 4" (1.626 m)   PainSc:   6     General    Nursing note and vitals reviewed.  Constitutional: She is oriented to person, place, and time. She appears well-developed and well-nourished. No distress.   HENT:   Head: Normocephalic and atraumatic.   Nose: Nose normal.   Eyes: Conjunctivae and EOM are normal. Pupils are equal, round, and reactive to light. Right eye exhibits no discharge. Left eye exhibits no discharge. No scleral icterus.   Neck: No JVD present.   Cardiovascular: Intact distal pulses.    Pulmonary/Chest: Effort normal. No respiratory distress.   Abdominal: She exhibits no distension.   Neurological: She is alert and oriented to person, place, and time. Coordination normal.   Psychiatric: She has a normal mood and affect. Her behavior is normal. Judgment and thought content normal.     General Musculoskeletal Exam   Gait: normal     Back (L-Spine & T-Spine) / Neck (C-Spine) Exam     Tenderness Right paramedian tenderness of the Lower L-Spine. Left paramedian tenderness of the Lower L-Spine.     Back (L-Spine & T-Spine) Range of Motion   Back extension: facet loading is positive and exacerabtes/reproduces the patient's typical low back pain    Back flexion: limited ROM but partial relief of low back pain noted.     Spinal Sensation   Right Side Sensation  L-Spine Level: normal  Left Side Sensation  L-Spine Level: normal    Other She has no scoliosis .      Muscle Strength   Right Lower Extremity   Hip Flexion: 5/5   Hip Extensors: 5/5  Quadriceps:  5/5   Hamstrin/5   Gastrocsoleus:  5/5/5  Left Lower Extremity   Hip Flexion: 5/5   Hip Extensors: 5/5  Quadriceps:  5/5   Hamstrin/5   Gastrocsoleus:  5/5/5    Reflexes     Left Side  Quadriceps:  2+  Achilles:  2+    Right Side   Quadriceps:  2+  Achilles:  2+      Imaging:  MRI Lumbar Spine Without Contrast   Order: [id number] "   Status:  Final result   Visible to patient:  Yes (Patient Portal) Next appt:  Today at 03:00 PM in Outpatient Rehab (Krzysztof Russell, PT) Dx:  Neurogenic claudication   Details     Reading Physician Reading Date Result Priority   Ruddy Cuba MD 5/22/2019       Narrative     EXAMINATION:  MRI LUMBAR SPINE WITHOUT CONTRAST    CLINICAL HISTORY:  Spinal stenosis, lumbar region with neurogenic claudicationLow back pain, >6wks conservative tx, persistent-progressive sx, surgical candidate;    TECHNIQUE:  Sagittal T1, sagittal T2, sagittal STIR, axial T1 and axial T2 weighted images of the lumbar spine obtained without contrast.    FINDINGS:  Reversal of the normal lumbar lordosis.  No abnormal signal in the distal cord.  The vertebral body heights are well maintained, with no fracture.  No marrow signal abnormality suspicious for an infiltrative process.    The conus is normal in appearance, and terminates at the L1 level.  The adjacent soft tissue structures show no significant abnormalities.    L1-L2: Disc space narrowing with broad-based disc bulge.  Bilateral degenerative changes of the facets.  Mild bilateral bony neural foraminal narrowing.    L2-L3: Disc space narrowing.  Circumferential disc bulge.  Mild degenerative changes of the facets.  Mild bilateral bony neural foraminal narrowing greater on the left.  Possible but not definite left L2 nerve root impingement.    L3-L4: Disc space narrowing.  Circumferential disc bulge.  Moderate central spinal stenosis.  Bilateral bony neural foraminal narrowing with possible but not definite L3 nerve root impingement.    L4-L5: Disc space narrowing.  Circumferential disc bulge.  Bilateral degenerative changes of the facets.  Bilateral bony neural foraminal narrowing greater on the right side with possible right-sided L4 nerve root impingement.    L5-S1: Disc space narrowing.  Circumferential disc bulge.  Bilateral degenerative change facets.  Bilateral bony neural  foraminal narrowing and possible bilateral L5 nerve root impingement.      Impression       Disc space narrowing at the L1-2, L2-3, L3-4, L4-5, and L5-S1 levels.  Bony neural foraminal narrowing on the right side at the L4-L5 level with possible right-sided L4 nerve root impingement.  Multilevel neural foraminal narrowings on the left side without evidence of definite nerve root impingement.  Multilevel facet arthropathy.      Electronically signed by: Ruddy Cuba MD  Date: 05/22/2019  Time: 10:08               Labs:  BMP  Lab Results   Component Value Date     02/01/2019    K 4.3 02/01/2019     02/01/2019    CO2 24 02/01/2019    BUN 13 02/01/2019    CREATININE 0.8 02/01/2019    CALCIUM 9.4 02/01/2019    ANIONGAP 13 02/01/2019    ESTGFRAFRICA >60 02/01/2019    EGFRNONAA >60 02/01/2019     Lab Results   Component Value Date    ALT 35 02/01/2019    AST 34 02/01/2019    ALKPHOS 47 (L) 02/01/2019    BILITOT 0.4 02/01/2019       Assessment:  Adriana Strong is a 74 y.o. female with the following diagnoses based on history, exam, and imaging:    Problem List Items Addressed This Visit     DDD (degenerative disc disease), lumbar    Low back pain - Primary    Decreased range of motion of trunk and back    Lumbar spondylosis    Neuroforaminal stenosis of lumbar spine    Spinal stenosis of lumbar region with neurogenic claudication        : Reviewed and consistent with medication use as prescribed.    Treatment Plan:   Procedures:  I recommend lumbar epidural steroid injection at L4-5.  Will need clearance from 2 different providers for her to hold Plavix and Eliquis for the procedure. If we are unable to hold these medications for the minimum rec was it time, I will in by the patient back to clinic for us to discuss additional management options, which will likely focus on medication management.  PT/OT/HEP: I plan to consider FRP for back rehab for this patient as she is a poor candidate for surgery due  vasculopathy and anticoagulation.  She is starting PT for now, which I encouraged her to attend.  Medications: No changes recommended at this time.  Labs: reviewed and medications are appropriately dosed for current hepatorenal function.  Imaging: No additional recommended at this time.    Follow Up: RTC 2-3 weeks    Mallory Tirado Jr, MD  Interventional Pain Medicine / Anesthesiology    Disclaimer: This note was partly generated using dictation software which may occasionally result in transcription errors.

## 2019-06-10 NOTE — LETTER
Guillermina 10, 2019      Sean Saravia MD  [address]  [address]  [place] [address]           [facility]  [address]  [place] [address]  Phone: [phone]          Patient: Adriana Strong   MR Number: [id number]   YOB: 1945   Date of Visit: 6/10/2019       Dear Dr. Sean Saravia:    Thank you for referring Adriana Strong to me for evaluation. Attached you will find relevant portions of my assessment and plan of care.    If you have questions, please do not hesitate to call me. I look forward to following Adriana Strong along with you.    Sincerely,    Mallory Tirado Jr., MD    Enclosure  CC:  No Recipients    If you would like to receive this communication electronically, please contact [email or web address] or [phone] to request more information on [employer] access.    For providers and/or their staff who would like to refer a patient to [facility], please contact us through our one-stop-shop provider referral line, [facility], at [phone].    If you feel you have received this communication in error or would no longer like to receive these types of communications, please e-mail [email or web address]

## 2019-06-11 ENCOUNTER — TELEPHONE (OUTPATIENT)
Dept: CARDIOLOGY | Facility: CLINIC | Age: 74
End: 2019-06-11

## 2019-06-11 ENCOUNTER — TELEPHONE (OUTPATIENT)
Dept: PAIN MEDICINE | Facility: CLINIC | Age: 74
End: 2019-06-11

## 2019-06-11 NOTE — TELEPHONE ENCOUNTER
----- Message from Jr Carrington MD sent at 6/11/2019  3:59 PM CDT -----  There is a risk of stroke if Eliquis is held for an epidural steroid injection which has to be weighed against the potential benefit of the procedure and the risk of bleeding if the procedure is performed while taking Eliquis.  In general it would not be necessary to hold Eliquis for more than a day or two prior to any elective procedure.  ----- Message -----  From: Alida Hernandez  Sent: 6/11/2019   3:12 PM  To: Jr Carrington MD        ----- Message -----  From: Kristie Jalloh MA  Sent: 6/11/2019   2:27 PM  To: Zeb Villalba [facility] requesting clearance for pt to hold Eliquis 5 days for a Lumbar LAMAR  Please advise  Thanks

## 2019-06-12 ENCOUNTER — CLINICAL SUPPORT (OUTPATIENT)
Dept: REHABILITATION | Facility: HOSPITAL | Age: 74
End: 2019-06-12
Attending: PSYCHIATRY & NEUROLOGY
Payer: MEDICARE

## 2019-06-12 DIAGNOSIS — R26.89 BALANCE PROBLEMS: ICD-10-CM

## 2019-06-12 DIAGNOSIS — M54.42 CHRONIC BILATERAL LOW BACK PAIN WITH BILATERAL SCIATICA: ICD-10-CM

## 2019-06-12 DIAGNOSIS — M54.41 CHRONIC BILATERAL LOW BACK PAIN WITH BILATERAL SCIATICA: ICD-10-CM

## 2019-06-12 DIAGNOSIS — M62.81 MUSCLE WEAKNESS: ICD-10-CM

## 2019-06-12 DIAGNOSIS — G89.29 CHRONIC BILATERAL LOW BACK PAIN WITH BILATERAL SCIATICA: ICD-10-CM

## 2019-06-12 DIAGNOSIS — R26.2 DIFFICULTY WALKING: ICD-10-CM

## 2019-06-12 PROCEDURE — 97110 THERAPEUTIC EXERCISES: CPT | Mod: PO

## 2019-06-12 NOTE — PROGRESS NOTES
"  Physical Therapy Daily Treatment Note     Name: Adriana [place]  Clinic Number: [id number]    Therapy Diagnosis:   Encounter Diagnoses   Name Primary?    Chronic bilateral low back pain with bilateral sciatica     Difficulty walking     Muscle weakness     Balance problems      Physician: Sean Saravia MD    Visit Date: 6/12/2019    Physician Orders: PT Eval and Treat   Medical Diagnosis from Referral: M48.062 (ICD-10-CM) - Spinal stenosis of lumbar region with neurogenic claudication  Evaluation Date: 6/6/2019  Authorization Period Expiration: 5/21/20  Plan of Care Expiration: 8/6/19  Visit # / Visits authorized: 3/pending    Time In: 9:00am  Time Out: 9:50  Total Billable Time: 30 minutes    Precautions: Standard and Fall    Subjective     Pt reports: achy pain across low back on this date.   She was compliant with home exercise program.  Response to previous treatment: muscle soreness  Functional change: no change at this time    Pain: 6/10  Location: bilateral back      Objective     Adriana received therapeutic exercises to develop strength, endurance, ROM, flexibility, posture and core stabilization for 45 minutes including:    Date  6/12/19 6/10/19   VISIT 3 2/pending   G CODE VISIT 310 2/10   POC EXP. DATE 8/6/19 8/6/19   VISIT AMOUNT  MEDICARE TOTAL 60.64  204.16 60.64  143.52   FACE-TO-FACE 7/6/19 7/6/19   FOTO 3/5 2/5        Recumbent bike seat 11 8 minutes 8 minutes   TABLE:     Quad sets 3 x 10 x 3" 2 x10 x 3"   TA 3 x 10 x 3" 2 x 10 x 3"   bridging 1 x 10 2 x 5   LTR 2 x 10 2 x 10   TA with March Next?    Hip abduction 2 x 10 RTB    Hip adduction 2 x 10 x 3" w/ball    SEATED:     LAQ 2 x 10 2 x 10   TA  1 x 10 1 x 10   TA with march 2 x 10 2 x 10   STANDING:                         Initials YESSI DICKSON         Adriana received the following manual therapy techniques: were applied to the: lumbar and bilateral LE's for 5 minutes, including:  Manual stretching of bilateral hamstrings and " piriformis  Manual single knee to chest      Home Exercises Provided and Patient Education Provided     Education provided:   - pt instructed on proper breathing and frequent rest breaks during exercises    Written Home Exercises Provided: Patient instructed to cont prior HEP.  Exercises were reviewed and Adriana was able to demonstrate them prior to the end of the session.  Adriana demonstrated good  understanding of the education provided.     See EMR under Patient Instructions for exercises provided prior visit.    Assessment     Pt has improved tolerance to exercise protocol but continues to require frequent rest breaks due to shortness of breath.  She is progressing with strengthening exercises but continues to require verbal and tactile cues for sitting posture.  She will require continued manual stretching to help decrease back pain and improve LE flexibility.    Adriana is progressing well towards her goals.   Pt prognosis is Good.     Pt will continue to benefit from skilled outpatient physical therapy to address the deficits listed in the problem list box on initial evaluation, provide pt/family education and to maximize pt's level of independence in the home and community environment.     Pt's spiritual, cultural and educational needs considered and pt agreeable to plan of care and goals.     Anticipated barriers to physical therapy: none    Goals:   Short Term Goals: 4 weeks  1. Patient will be compliant with HEP to promote the independent management of current diagnosis. In progress  2. Patient will increase lumbar forward bending to reach mid shin level for function of dressing.  In progress  3. Patient will improve bilateral hamstring flexibility to (30)deg during 90/90 testing.  In progress  4. Patient will report a decrease in complaints of low back pain from 10/10 to 6/10 during ADLs. In progress        Long Term Goals:  8 weeks  1. Patient will improve core stabilization strength to Good to  improve tolerance to normal house work activities for self care independence. In progress  2. Patient will increase standing balance to Good. In progress  3. Patient will report a decrease in complaints of low back pain from 10/10 to 4/10 during ADLs. In progress  4. Patient will improve FOTO limitation status from 58% to 45% placing the patient in the stage 3 impaired, limited, or restricted category indicating increased functional mobility. In progress    Plan   Pt will continue with exercise protocol and progress as tolerated.     Krzysztof Russell, PT

## 2019-06-17 ENCOUNTER — CLINICAL SUPPORT (OUTPATIENT)
Dept: REHABILITATION | Facility: HOSPITAL | Age: 74
End: 2019-06-17
Attending: PSYCHIATRY & NEUROLOGY
Payer: MEDICARE

## 2019-06-17 DIAGNOSIS — G89.29 CHRONIC BILATERAL LOW BACK PAIN WITH BILATERAL SCIATICA: ICD-10-CM

## 2019-06-17 DIAGNOSIS — M62.81 MUSCLE WEAKNESS: ICD-10-CM

## 2019-06-17 DIAGNOSIS — R26.89 BALANCE PROBLEMS: ICD-10-CM

## 2019-06-17 DIAGNOSIS — M54.41 CHRONIC BILATERAL LOW BACK PAIN WITH BILATERAL SCIATICA: ICD-10-CM

## 2019-06-17 DIAGNOSIS — M54.42 CHRONIC BILATERAL LOW BACK PAIN WITH BILATERAL SCIATICA: ICD-10-CM

## 2019-06-17 DIAGNOSIS — R26.2 DIFFICULTY WALKING: ICD-10-CM

## 2019-06-17 PROCEDURE — 97110 THERAPEUTIC EXERCISES: CPT | Mod: PO

## 2019-06-17 NOTE — PROGRESS NOTES
"  Physical Therapy Daily Treatment Note     Name: Adriana Strong  Clinic Number: [id number]    Therapy Diagnosis:   Encounter Diagnoses   Name Primary?    Chronic bilateral low back pain with bilateral sciatica     Difficulty walking     Muscle weakness     Balance problems      Physician: Sean Saravia MD    Visit Date: 6/17/2019    Physician Orders: PT Eval and Treat   Medical Diagnosis from Referral: M48.062 (ICD-10-CM) - Spinal stenosis of lumbar region with neurogenic claudication  Evaluation Date: 6/6/2019  Authorization Period Expiration: 5/21/20  Plan of Care Expiration: 8/6/19  Visit # / Visits authorized: 3/pending    Time In: 11:00am  Time Out:12:00 pm  Total Billable Time: 30 minutes    Precautions: Standard and Fall    Subjective     Pt reports: she has been performing HEP daily and has decreased pain levels on this date.   She was compliant with home exercise program.  Response to previous treatment: muscle soreness  Functional change: no change at this time    Pain: 4/10  Location: bilateral back      Objective     Adriana received therapeutic exercises to develop strength, endurance, ROM, flexibility, posture and core stabilization for 45 minutes including:    Date  6/17/19 6/12/19 6/10/19   VISIT 4 3 2/pending   G CODE VISIT 4/10 310 2/10   POC EXP. DATE 8/6/19 8/6/19 8/6/19   VISIT AMOUNT  MEDICARE TOTAL 60.64  264.80 60.64  204.16 60.64  143.52   FACE-TO-FACE 7/6/19 7/6/19 7/6/19   FOTO  3/5 2/5         Recumbent bike seat 11 15 minutes 8 minutes 8 minutes   TABLE:      Quad sets 3 x 10 x 3" 3 x 10 x 3" 2 x10 x 3"   TA 3 x 10 x 3" 3 x 10 x 3" 2 x 10 x 3"   bridging 2 x 10 1 x 10 2 x 5   LTR 2  X 10 2 x 10 2 x 10   TA with March 2 x 10 Next?    Hip abduction 3 x 10 RTB 2 x 10 RTB    Hip adduction 3 x 10 x 3" w/ball 2 x 10 x 3" w/ball    SEATED:      LAQ 2 x 10 2 x 10 2 x 10   TA  1 x 10 1 x 10 1 x 10   TA with march 2 x 10 2 x 10 2 x 10   STANDING:                              Initials MA MA " YESSI Wright received the following manual therapy techniques: were applied to the: lumbar and bilateral LE's for 5 minutes, including:  Manual stretching of bilateral hamstrings and piriformis  Manual single knee to chest      Home Exercises Provided and Patient Education Provided     Education provided:   - pt instructed on proper breathing and frequent rest breaks during exercises    Written Home Exercises Provided: Patient instructed to cont prior HEP.  Exercises were reviewed and Adriana was able to demonstrate them prior to the end of the session.  Adriana demonstrated good  understanding of the education provided.     See EMR under Patient Instructions for exercises provided prior visit.    Assessment     Pt has improved standing posture and is able to progress with exercises on this date.  She continues to have difficulty breathing and requires frequent rest breaks.  Pt will continue with manual stretching to decrease pain and muscle guarding in lower lumbar musculature.      Adriana is progressing well towards her goals.   Pt prognosis is Good.     Pt will continue to benefit from skilled outpatient physical therapy to address the deficits listed in the problem list box on initial evaluation, provide pt/family education and to maximize pt's level of independence in the home and community environment.     Pt's spiritual, cultural and educational needs considered and pt agreeable to plan of care and goals.     Anticipated barriers to physical therapy: none    Goals:   Short Term Goals: 4 weeks  1. Patient will be compliant with HEP to promote the independent management of current diagnosis. In progress  2. Patient will increase lumbar forward bending to reach mid shin level for function of dressing.  In progress  3. Patient will improve bilateral hamstring flexibility to (30)deg during 90/90 testing.  In progress  4. Patient will report a decrease in complaints of low back pain from 10/10 to 6/10  during ADLs. In progress        Long Term Goals:  8 weeks  1. Patient will improve core stabilization strength to Good to improve tolerance to normal house work activities for self care independence. In progress  2. Patient will increase standing balance to Good. In progress  3. Patient will report a decrease in complaints of low back pain from 10/10 to 4/10 during ADLs. In progress  4. Patient will improve FOTO limitation status from 58% to 45% placing the patient in the stage 3 impaired, limited, or restricted category indicating increased functional mobility. In progress    Plan   Pt will continue with exercise protocol and progress as tolerated.     Krzysztof Russell, PT

## 2019-06-19 ENCOUNTER — CLINICAL SUPPORT (OUTPATIENT)
Dept: REHABILITATION | Facility: HOSPITAL | Age: 74
End: 2019-06-19
Attending: PSYCHIATRY & NEUROLOGY
Payer: MEDICARE

## 2019-06-19 DIAGNOSIS — R26.2 DIFFICULTY WALKING: ICD-10-CM

## 2019-06-19 DIAGNOSIS — M62.81 MUSCLE WEAKNESS: ICD-10-CM

## 2019-06-19 DIAGNOSIS — M54.41 CHRONIC BILATERAL LOW BACK PAIN WITH BILATERAL SCIATICA: ICD-10-CM

## 2019-06-19 DIAGNOSIS — R26.89 BALANCE PROBLEMS: ICD-10-CM

## 2019-06-19 DIAGNOSIS — G89.29 CHRONIC BILATERAL LOW BACK PAIN WITH BILATERAL SCIATICA: ICD-10-CM

## 2019-06-19 DIAGNOSIS — M54.42 CHRONIC BILATERAL LOW BACK PAIN WITH BILATERAL SCIATICA: ICD-10-CM

## 2019-06-19 PROCEDURE — 97110 THERAPEUTIC EXERCISES: CPT | Mod: PO

## 2019-06-19 NOTE — PROGRESS NOTES
"  Physical Therapy Daily Treatment Note     Name: Adriana Strong  Clinic Number: [id number]    Therapy Diagnosis:   Encounter Diagnoses   Name Primary?    Chronic bilateral low back pain with bilateral sciatica     Difficulty walking     Muscle weakness     Balance problems      Physician: Sean Saravia MD    Visit Date: 6/19/2019    Physician Orders: PT Eval and Treat   Medical Diagnosis from Referral: M48.062 (ICD-10-CM) - Spinal stenosis of lumbar region with neurogenic claudication  Evaluation Date: 6/6/2019  Authorization Period Expiration: 5/21/20  Plan of Care Expiration: 8/6/19  Visit # / Visits authorized: 4/ 12    Time In: 9:00 am  Time Out: 9:45 am  Total Billable Time: 25 minutes    Precautions: Standard and Fall    Subjective     Pt reports: overall her pain is down but continues with episodes of difficulty breathing while trying to do her exercises.   She was compliant with home exercise program.  Response to previous treatment: muscle soreness  Functional change: no change at this time    Pain: 4/10  Location: bilateral back      Objective     Adriana received therapeutic exercises to develop strength, endurance, ROM, flexibility, posture and core stabilization for 25 minutes including:      Date  06/19/19 6/17/19 6/12/19 6/10/19   VISIT 4/12 4 3 2/pending   G CODE VISIT 5/10 4/10 310 2/10   POC EXP. DATE 08/06/19 8/6/19 8/6/19 8/6/19   VISIT AMOUNT  MEDICARE TOTAL 60.64  325.44 60.64  264.80 60.64  204.16 60.64  143.52   FACE-TO-FACE 07/06/19 7/6/19 7/6/19 7/6/19   FOTO   3/5 2/5          Recumbent bike seat 11 L1 x 5' 15 minutes 8 minutes 8 minutes   TABLE:       Quad sets 3 x 10 x 3" 3 x 10 x 3" 3 x 10 x 3" 2 x10 x 3"   TA 3 x 10 x 3" 3 x 10 x 3" 3 x 10 x 3" 2 x 10 x 3"   bridging 2 x 10 2 x 10 1 x 10 2 x 5   LTR 2 x 10 2  X 10 2 x 10 2 x 10   TA with March Not today 2 x 10 Next?    Hip abduction Not today 3 x 10 RTB 2 x 10 RTB    Hip adduction Not today 3 x 10 x 3" w/ball 2 x 10 x 3" w/ball  "   SEATED:       LAQ 2 x 10 2 x 10 2 x 10 2 x 10   TA  1 x 10 1 x 10 1 x 10 1 x 10   TA with march 2 x 10 2 x 10 2 x 10 2 x 10   STANDING:                                   Initials GWA 1/6 MA YESSI DICKSON     Functional limitation reporting disability percentage was obtained through use of FOTO lumbar spine Survey indicating 54% disability     Adriana received the following manual therapy techniques: were applied to the: lumbar and bilateral LE's for 5 minutes, including: NOT TODAY  Manual stretching of bilateral hamstrings and piriformis  Manual single knee to chest    Home Exercises Provided and Patient Education Provided     Education provided:   - again, patient was instructed on proper breathing and frequent rest breaks during exercises    Written Home Exercises Provided: Patient instructed to cont prior HEP.  Exercises were reviewed and Adriana was able to demonstrate them prior to the end of the session.  Adriana demonstrated good  understanding of the education provided.     See EMR under Patient Instructions for exercises provided 06/06/2019.    Assessment     Patient continues to have difficulty breathing and requires frequent rest breaks.  Patient did not complete all of her exercises due to feeling dizzy and short of breath while lying down.         Adriana is progressing well towards her goals.   Pt prognosis is Good.     Pt will continue to benefit from skilled outpatient physical therapy to address the deficits listed in the problem list box on initial evaluation, provide pt/family education and to maximize pt's level of independence in the home and community environment.     Pt's spiritual, cultural and educational needs considered and pt agreeable to plan of care and goals.     Anticipated barriers to physical therapy: none    Goals:   Short Term Goals: 4 weeks  1. Patient will be compliant with HEP to promote the independent management of current diagnosis. In progress  2. Patient will increase lumbar  forward bending to reach mid shin level for function of dressing.  In progress  3. Patient will improve bilateral hamstring flexibility to (30)deg during 90/90 testing.  In progress  4. Patient will report a decrease in complaints of low back pain from 10/10 to 6/10 during ADLs. In progress    Long Term Goals:  8 weeks  1. Patient will improve core stabilization strength to Good to improve tolerance to normal house work activities for self care independence. In progress  2. Patient will increase standing balance to Good. In progress  3. Patient will report a decrease in complaints of low back pain from 10/10 to 4/10 during ADLs. In progress  4. Patient will improve FOTO limitation status from 58% to 45% placing the patient in the stage 3 impaired, limited, or restricted category indicating increased functional mobility. In progress  5.   Plan     Resume all exercises next visit and try splitting them up between supine and sitting exercises.    Rufino Newell, PTA

## 2019-06-20 ENCOUNTER — TELEPHONE (OUTPATIENT)
Dept: PAIN MEDICINE | Facility: CLINIC | Age: 74
End: 2019-06-20

## 2019-06-20 ENCOUNTER — TELEPHONE (OUTPATIENT)
Dept: CARDIOLOGY | Facility: CLINIC | Age: 74
End: 2019-06-20

## 2019-06-20 NOTE — TELEPHONE ENCOUNTER
Called pt to update her on holding blood thinners. No answer left detailed message that I have not heard back from Dr Hernandez for clearance to hold Plavix. Advised pt that maybe she could call Dr Hernandez to speed up the process. Asked pt to call me back .

## 2019-06-20 NOTE — TELEPHONE ENCOUNTER
----- Message from Debra Lopez sent at 6/20/2019  2:47 PM CDT -----  No. [phone]    Patient did not completely understand the message left.   Please call.

## 2019-06-24 ENCOUNTER — CLINICAL SUPPORT (OUTPATIENT)
Dept: REHABILITATION | Facility: HOSPITAL | Age: 74
End: 2019-06-24
Attending: PSYCHIATRY & NEUROLOGY
Payer: MEDICARE

## 2019-06-24 DIAGNOSIS — R26.2 DIFFICULTY WALKING: ICD-10-CM

## 2019-06-24 DIAGNOSIS — R26.89 BALANCE PROBLEMS: ICD-10-CM

## 2019-06-24 DIAGNOSIS — M54.42 CHRONIC BILATERAL LOW BACK PAIN WITH BILATERAL SCIATICA: ICD-10-CM

## 2019-06-24 DIAGNOSIS — M62.81 MUSCLE WEAKNESS: ICD-10-CM

## 2019-06-24 DIAGNOSIS — G89.29 CHRONIC BILATERAL LOW BACK PAIN WITH BILATERAL SCIATICA: ICD-10-CM

## 2019-06-24 DIAGNOSIS — M54.41 CHRONIC BILATERAL LOW BACK PAIN WITH BILATERAL SCIATICA: ICD-10-CM

## 2019-06-24 PROCEDURE — 97110 THERAPEUTIC EXERCISES: CPT | Mod: PO

## 2019-06-24 NOTE — PROGRESS NOTES
"  Physical Therapy Daily Treatment Note     Name: Adriana Strong  Clinic Number: [id number]    Therapy Diagnosis:   Encounter Diagnoses   Name Primary?    Chronic bilateral low back pain with bilateral sciatica     Difficulty walking     Muscle weakness     Balance problems      Physician: Sean Saravia MD    Visit Date: 6/24/2019    Physician Orders: PT Eval and Treat   Medical Diagnosis from Referral: M48.062 (ICD-10-CM) - Spinal stenosis of lumbar region with neurogenic claudication  Evaluation Date: 6/6/2019  Authorization Period Expiration: 5/21/20  Plan of Care Expiration: 8/6/19  Visit # / Visits authorized: 5/ 12    Time In: 10:00 am  Time Out: 10:48 am  Total Billable Time: 48 minutes    Precautions: Standard and Fall    Subjective     Pt reports: her back pain hasn't changed much.   She was compliant with home exercise program.  Response to previous treatment: muscle soreness  Functional change: no change at this time    Pain: 4/10  Location: bilateral back      Objective     Adriana received therapeutic exercises to develop strength, endurance, ROM, flexibility, posture and core stabilization for 43 minutes including:      Date  06/24/19 06/19/19 6/17/19 6/12/19 6/10/19   VISIT 5/12 4/12 4 3 2/pending   G CODE VISIT 6/10 5/10 4/10 310 2/10   POC EXP. DATE 08/06/19 08/06/19 8/6/19 8/6/19 8/6/19   VISIT AMOUNT  MEDICARE TOTAL 90.96  416.40 60.64  325.44 60.64  264.80 60.64  204.16 60.64  143.52   FACE-TO-FACE 07/06/19 07/06/19 7/6/19 7/6/19 7/6/19   FOTO    3/5 2/5           Recumbent bike seat 11 L1 x 5' L1 x 5' 15 minutes 8 minutes 8 minutes   TABLE:        Quad sets 3 x 10 x 3" 3 x 10 x 3" 3 x 10 x 3" 3 x 10 x 3" 2 x10 x 3"   TA 2 x 10 x 3" 3 x 10 x 3" 3 x 10 x 3" 3 x 10 x 3" 2 x 10 x 3"   bridging 2 x 10 2 x 10 2 x 10 1 x 10 2 x 5   LTR 2 x 10 2 x 10 2  X 10 2 x 10 2 x 10   TA with March 2 x 10 Not today 2 x 10 Next?    Hip abduction 3 x 10 RTB Not today 3 x 10 RTB 2 x 10 RTB    Hip adduction - " "seated 30 x 3" w/ ball Not today 30 x 3" w/ball 20 x 3" w/ball    SEATED:        LAQ 2 x 10 2 x 10 2 x 10 2 x 10 2 x 10   TA  1 x 10 1 x 10 1 x 10 1 x 10 1 x 10   TA with march 2 x 10 2 x 10 2 x 10 2 x 10 2 x 10   STANDING:                                        Initials GWA 2/6 GWA 1/6 YESSI DICKSON MA       Adriana received the following manual therapy techniques: were applied to the: lumbar and bilateral LE's for 5 minutes, including:   Manual stretching of bilateral hamstrings  Manual single knee to chest    Home Exercises Provided and Patient Education Provided     Education provided:   - again, patient was instructed on proper breathing and frequent rest breaks during exercises    Written Home Exercises Provided: Patient instructed to cont prior HEP.  Exercises were reviewed and Adriana was able to demonstrate them prior to the end of the session.  Adriana demonstrated good  understanding of the education provided.     See EMR under Patient Instructions for exercises provided 06/06/2019.    Assessment     Patient continues to have difficulty breathing, feeling dizzy and requires frequent rest breaks.  Patient performed above exercise program with verbal cueing for proper technique.  When patient completed her therapy and was walking toward the waiting area, she had to stop and rest due to feeling dizzy and weak.  She rested for about 5 minutes and ambulated to the waiting area with SBA.    Adriana is progressing well towards her goals.   Pt prognosis is Good.     Pt will continue to benefit from skilled outpatient physical therapy to address the deficits listed in the problem list box on initial evaluation, provide pt/family education and to maximize pt's level of independence in the home and community environment.     Pt's spiritual, cultural and educational needs considered and pt agreeable to plan of care and goals.     Anticipated barriers to physical therapy: none    Goals:   Short Term Goals: 4 " weeks  1. Patient will be compliant with HEP to promote the independent management of current diagnosis. In progress  2. Patient will increase lumbar forward bending to reach mid shin level for function of dressing.  In progress  3. Patient will improve bilateral hamstring flexibility to (30)deg during 90/90 testing.  In progress  4. Patient will report a decrease in complaints of low back pain from 10/10 to 6/10 during ADLs. In progress    Long Term Goals:  8 weeks  1. Patient will improve core stabilization strength to Good to improve tolerance to normal house work activities for self care independence. In progress  2. Patient will increase standing balance to Good. In progress  3. Patient will report a decrease in complaints of low back pain from 10/10 to 4/10 during ADLs. In progress  4. Patient will improve FOTO limitation status from 58% to 45% placing the patient in the stage 3 impaired, limited, or restricted category indicating increased functional mobility. In progress  5.   Plan     Progress as patient is able.    Rufino Newell, PTA

## 2019-06-25 ENCOUNTER — OFFICE VISIT (OUTPATIENT)
Dept: NEUROLOGY | Facility: CLINIC | Age: 74
End: 2019-06-25
Payer: MEDICARE

## 2019-06-25 VITALS
SYSTOLIC BLOOD PRESSURE: 155 MMHG | HEIGHT: 64 IN | WEIGHT: 236.31 LBS | BODY MASS INDEX: 40.34 KG/M2 | HEART RATE: 85 BPM | DIASTOLIC BLOOD PRESSURE: 79 MMHG

## 2019-06-25 DIAGNOSIS — E11.42 CONTROLLED TYPE 2 DIABETES MELLITUS WITH DIABETIC POLYNEUROPATHY, WITHOUT LONG-TERM CURRENT USE OF INSULIN: ICD-10-CM

## 2019-06-25 DIAGNOSIS — M54.16 LUMBAR RADICULOPATHY: ICD-10-CM

## 2019-06-25 DIAGNOSIS — Z86.73 OLD LACUNAR STROKE WITHOUT LATE EFFECT: ICD-10-CM

## 2019-06-25 PROCEDURE — 3078F PR MOST RECENT DIASTOLIC BLOOD PRESSURE < 80 MM HG: ICD-10-PCS | Mod: CPTII,S$GLB,, | Performed by: PSYCHIATRY & NEUROLOGY

## 2019-06-25 PROCEDURE — 99999 PR PBB SHADOW E&M-EST. PATIENT-LVL V: CPT | Mod: PBBFAC,,, | Performed by: PSYCHIATRY & NEUROLOGY

## 2019-06-25 PROCEDURE — 3077F SYST BP >= 140 MM HG: CPT | Mod: CPTII,S$GLB,, | Performed by: PSYCHIATRY & NEUROLOGY

## 2019-06-25 PROCEDURE — 99214 OFFICE O/P EST MOD 30 MIN: CPT | Mod: S$GLB,,, | Performed by: PSYCHIATRY & NEUROLOGY

## 2019-06-25 PROCEDURE — 3078F DIAST BP <80 MM HG: CPT | Mod: CPTII,S$GLB,, | Performed by: PSYCHIATRY & NEUROLOGY

## 2019-06-25 PROCEDURE — 99999 PR PBB SHADOW E&M-EST. PATIENT-LVL V: ICD-10-PCS | Mod: PBBFAC,,, | Performed by: PSYCHIATRY & NEUROLOGY

## 2019-06-25 PROCEDURE — 1101F PT FALLS ASSESS-DOCD LE1/YR: CPT | Mod: CPTII,S$GLB,, | Performed by: PSYCHIATRY & NEUROLOGY

## 2019-06-25 PROCEDURE — 3077F PR MOST RECENT SYSTOLIC BLOOD PRESSURE >= 140 MM HG: ICD-10-PCS | Mod: CPTII,S$GLB,, | Performed by: PSYCHIATRY & NEUROLOGY

## 2019-06-25 PROCEDURE — 1101F PR PT FALLS ASSESS DOC 0-1 FALLS W/OUT INJ PAST YR: ICD-10-PCS | Mod: CPTII,S$GLB,, | Performed by: PSYCHIATRY & NEUROLOGY

## 2019-06-25 PROCEDURE — 99214 PR OFFICE/OUTPT VISIT, EST, LEVL IV, 30-39 MIN: ICD-10-PCS | Mod: S$GLB,,, | Performed by: PSYCHIATRY & NEUROLOGY

## 2019-06-25 RX ORDER — DULOXETIN HYDROCHLORIDE 20 MG/1
20 CAPSULE, DELAYED RELEASE ORAL 2 TIMES DAILY
Qty: 180 CAPSULE | Refills: 3 | Status: SHIPPED | OUTPATIENT
Start: 2019-06-25 | End: 2020-07-10

## 2019-06-25 NOTE — PROGRESS NOTES
[facility] - NEUROLOGY EPILEPSY  [facility] [place]    Date: 6/25/19   Patient Name: Adriana Strong   MRN: [id number]   PCP: Krzysztof Mcgraw  Referring Provider: No ref. provider found    Assessment:   Adriana Strong is a 74 y.o. female follow-up for chronic polyneuropathy and right-sided lumbar radiculopathy. Patient wishes to continue with PT and attempt increased dose of cymbalta for pain control prior to consideration of interventional procedures. The patient would have to remain off off Plavix and Eliquis for several days for the procedure. This would not be risk-free given her history of stroke and coronary stent.  Plan:     Problem List Items Addressed This Visit        Neuro    Old lacunar stroke without late effect    Overview     On Eliquis and plavix         Lumbar radiculopathy    Current Assessment & Plan      -- continue PT, increasing cymbalta to 20 mg BID            Endocrine    Controlled type 2 diabetes mellitus with diabetic polyneuropathy, without long-term current use of insulin    Overview     A1C 7.0         Current Assessment & Plan     -- continue gabapentin             Sean Saravia MD  [facility]   Department of Neurology    Patient note was created using MModal Dictation.  Any errors in syntax or even information may not have been identified and edited on initial review prior to signing this note.  Subjective:     HPI:   Ms. Adriana Strong is a 74 y.o. female presenting in follow-up for chronic polyneuropathy and right-sided lumbar radiculopathy.  The patient presents today with her  who contributes to the history.  Since the patient's visit, she has undergone MRI spine which revealed multilevel neuroforaminal narrowing and disc space narrowing with probable nerve root impingement at L4 on the right (personally reviewed).  The patient reports that gabapentin is not fully controlling her pain.  She has started a course of physical  therapy has been going the last 6 weeks with gradual improvement.  She did undergo evaluation with pain management who noted that the patient would have to stop both Eliquis and Plavix prior to injection.  The patient is currently weighing whether not she would like to risk a short-term discontinuation of Eliquis and Plavix to allow for injection.  At present she would like to proceed with therapy.  She denies any other new complaints today.    PAST MEDICAL HISTORY:  Past Medical History:   Diagnosis Date    Anticoagulant long-term use     on Plavix since May 2015    Anxiety     Arthritis     Asthma     Atrial fibrillation     Cataracts, bilateral     Chest pain, atypical     Colon polyp     Coronary artery disease     Diabetes mellitus     Dry eyes     Esophageal erosions     GERD (gastroesophageal reflux disease)     Heart murmur     Hemorrhoid     High cholesterol     Hypertension     Irritable bowel syndrome     Shingles 2015    TIA (transient ischemic attack)     Use of cane as ambulatory aid        PAST SURGICAL HISTORY:  Past Surgical History:   Procedure Laterality Date    ABDOMINAL SURGERY      angiocele      2007, 2014    ANGIOGRAM-ABDOMINAL N/A 5/5/2015    Performed by [facility] at [facility] OR 2ND FLR    ANGIOGRAM-ABDOMINAL SMA stent Left Brachial access. Right arm over head. Left 4/29/2015    Performed by [facility] at [facility] OR 2ND FLR    ANGIOGRAM-ABDOMINAL-INTESTINAL - Mesenteric angiogram Right CFA access, possible left arm access  Right 1/5/2016    Performed by [facility] at [facility] OR 2ND FLR    ANGIOGRAM-MESENTERIC  1/5/2016    Performed by [facility] at [facility] OR 2ND FLR    BREAST SURGERY      left--- a lump--- no cancer    COLONOSCOPY  2014    COLONOSCOPY N/A 3/14/2018    Performed by Efren Kemp MD at [facility] ENDO (4TH FLR)    ESOPHAGOGASTRODUODENOSCOPY (EGD) N/A 2/22/2016    Performed by Efren Kemp MD at [facility] ENDO (4TH FLR)     EXPLORATION-BLEEDING (RE-EXPLORATION) Left 5/7/2015    Performed by [facility] at [facility] OR 2ND FLR    HERNIA REPAIR      HYSTERECTOMY  partial    1982 partial hysterectomy    left nasal polyp      left neck lymph node      nose polyp      PLACEMENT-STENT Right 1/5/2016    Performed by [facility] at [facility] OR 2ND FLR    REPAIR-HERNIA-LAPAROSCOPIC-INCISIONAL w/ mesh N/A 6/12/2017    Performed by Jay Milligan MD at [facility] OR 2ND FLR    right hip fatty mass tissue      stent to small intestine      SUPERIOR VENA CAVA ANGIOPLASTY / STENTING      THROMBECTOMY - left brachial artery Left 5/5/2015    Performed by [facility] at [facility] OR 2ND FLR    TONSILLECTOMY      TOTAL ABDOMINAL HYSTERECTOMY      2014    TOTAL KNEE ARTHROPLASTY Bilateral     UPPER GASTROINTESTINAL ENDOSCOPY  2014       CURRENT MEDS:  Current Outpatient Medications   Medication Sig Dispense Refill    acetaminophen (TYLENOL ARTHRITIS) 650 MG TbSR Take 1,300 mg by mouth 2 (two) times daily.      albuterol (ACCUNEB) 1.25 mg/3 mL Nebu Take scheduled q4 for the next two days while at home then resume prn dosing 3 mL 1    albuterol (VENTOLIN HFA) 90 mcg/actuation inhaler Inhale 2 puffs into the lungs every 6 (six) hours as needed for Wheezing. Rescue      apixaban 5 mg Tab Take 1 tablet (5 mg total) by mouth 2 (two) times daily. 180 tablet 3    ascorbic acid (VITAMIN C) 500 MG tablet Take 500 mg by mouth once daily.      azelastine (ASTELIN) 137 mcg nasal spray 1 spray by Nasal route 2 (two) times daily.      budesonide-formoterol 160-4.5 mcg (SYMBICORT) 160-4.5 mcg/actuation HFAA Inhale 2 puffs into the lungs every 12 (twelve) hours. Controller      calcium carbonate (OS-MICHAEL) 600 mg (1,500 mg) Tab Take 1,200 mg by mouth once daily.      clopidogrel (PLAVIX) 75 mg tablet Take 1 tablet (75 mg total) by mouth once daily. 90 tablet 4    cyanocobalamin (VITAMIN B-12) 1000 MCG tablet Take 100 mcg by mouth once  daily.      digoxin (LANOXIN) 125 mcg tablet Take 1 tablet (125 mcg total) by mouth once daily. 90 tablet 3    diltiaZEM (CARDIZEM CD) 180 MG 24 hr capsule Take 1 capsule (180 mg total) by mouth once daily. 90 capsule 3    DULoxetine (CYMBALTA) 20 MG capsule Take 1 capsule (20 mg total) by mouth 2 (two) times daily. 180 capsule 3    fexofenadine (ALLEGRA) 60 MG tablet Take 60 mg by mouth every morning.      fish oil-omega-3 fatty acids 300-1,000 mg capsule Take 1 g by mouth once daily.       fluticasone (FLONASE) 50 mcg/actuation nasal spray 1 spray by Each Nare route every morning.       furosemide (LASIX) 40 MG tablet Take 1 tablet (40 mg total) by mouth once daily. 90 tablet 3    gabapentin (NEURONTIN) 300 MG capsule Take 4 capsules (1,200 mg total) by mouth 3 (three) times daily. 360 capsule 3    lisinopril (PRINIVIL,ZESTRIL) 20 MG tablet Take 1 tablet (20 mg total) by mouth once daily. 90 tablet 3    lorazepam (ATIVAN) 0.5 MG tablet Take 0.5 mg by mouth every morning.       magnesium 250 mg Tab Take 250 mg by mouth. 3 in am and 3 pm      ondansetron (ZOFRAN-ODT) 4 MG TbDL DISSOLVE 1 TABLET ON TONGUE EVERY 8 HOURS AS NEEDED FOR NAUSEA  1    oxybutynin (DITROPAN-XL) 5 MG TR24 Take 5 mg by mouth every evening.       pantoprazole (PROTONIX) 40 MG tablet Take 1 tablet (40 mg total) by mouth once daily. (Patient taking differently: Take 40 mg by mouth every morning. ) 90 tablet 3    polyethylene glycol (GLYCOLAX) 17 gram PwPk Take 17 g by mouth once daily.      POLYSORBATE 80/GLYCERIN (REFRESH DRY EYE THERAPY OPHT) Apply to eye 2 (two) times daily.      potassium gluconate 550 mg (90 mg) Tab Take by mouth. 2 in am and 2 pm      traMADol (ULTRAM) 50 mg tablet Take 1 tablet (50 mg total) by mouth every 6 (six) hours as needed for Pain. 20 tablet 0    zafirlukast (ACCOLATE) 20 MG tablet Take 20 mg by mouth once daily.  3    docusate sodium (COLACE) 100 MG capsule Take 1 capsule (100 mg total) by  "mouth 2 (two) times daily. 60 capsule 1    psyllium (METAMUCIL) powder Take 1 packet by mouth 3 (three) times daily.       No current facility-administered medications for this visit.        ALLERGIES:  Review of patient's allergies indicates:   Allergen Reactions    Celebrex [celecoxib] Shortness Of Breath    Ciprofloxacin Swelling     lip    Dicyclomine Hives    Adhesive Dermatitis    Avelox [moxifloxacin] Swelling    Cilostazol Other (See Comments)     Elevates blood pressure    Eryc [erythromycin] Other (See Comments)     unknown    Iodine and iodide containing products Hives    Keflex [cephalexin] Hives    Meclomen      rashes    Meloxicam      Ear ringing    Metoclopramide Other (See Comments)     High blood pressure    Sulfa (sulfonamide antibiotics) Itching       FAMILY HISTORY:  Family History   Problem Relation Age of Onset    Colon cancer Neg Hx     Inflammatory bowel disease Neg Hx        SOCIAL HISTORY:  Social History     Tobacco Use    Smoking status: Never Smoker    Smokeless tobacco: Never Used   Substance Use Topics    Alcohol use: No    Drug use: No       Review of Systems:  12 system review of systems is negative except for the symptoms mentioned in HPI.      Objective:     Vitals:    06/25/19 0924   BP: (!) 155/79   Pulse: 85   Weight: 107.2 kg (236 lb 5.3 oz)   Height: 5' 4" (1.626 m)     General: NAD, well nourished   Eyes: no tearing, discharge, no erythema   ENT: moist mucous membranes of the oral cavity, nares patent    Neck: Supple, full range of motion  Cardiovascular: Warm and well perfused, pulses equal and symmetrical  Lungs: Normal work of breathing, normal chest wall excursions  Skin: No rash, lesions, or breakdown on exposed skin  Psychiatry: Mood and affect are appropriate   Abdomen: soft, non tender, non distended  Extremeties: No cyanosis, clubbing or edema.    Neurological   MENTAL STATUS: Alert and oriented to person, place, and time. Attention and " concentration within normal limits. Speech without dysarthria. Recent and remote memory within normal limits   CRANIAL NERVES: Visual fields intact. PERRL. EOMI. Facial sensation intact. Face symmetrical. Hearing grossly intact. Full shoulder shrug bilaterally. Tongue protrudes midline   SENSORY: Sensation is reduced to LT, ST, temp, and vibration to knees  Joint position perception reduced bilaterally. Positive Romberg.   MOTOR: Normal bulk and tone. 5/5 deltoid, biceps, triceps, interosseous, hand  bilaterally. 4/5 iliopsoas, knee extension/flexion, foot dorsi/plantarflexion bilaterally.    REFLEXES: Symmetric and 1+ throughout.   CEREBELLAR/COORDINATION/GAIT: Gait wide based and unsteady. Finger to nose intact. Normal rapid alternating movements.

## 2019-06-26 ENCOUNTER — CLINICAL SUPPORT (OUTPATIENT)
Dept: REHABILITATION | Facility: HOSPITAL | Age: 74
End: 2019-06-26
Attending: PSYCHIATRY & NEUROLOGY
Payer: MEDICARE

## 2019-06-26 DIAGNOSIS — M62.81 MUSCLE WEAKNESS: ICD-10-CM

## 2019-06-26 DIAGNOSIS — M54.41 CHRONIC BILATERAL LOW BACK PAIN WITH BILATERAL SCIATICA: ICD-10-CM

## 2019-06-26 DIAGNOSIS — R26.2 DIFFICULTY WALKING: ICD-10-CM

## 2019-06-26 DIAGNOSIS — M54.42 CHRONIC BILATERAL LOW BACK PAIN WITH BILATERAL SCIATICA: ICD-10-CM

## 2019-06-26 DIAGNOSIS — R26.89 BALANCE PROBLEMS: ICD-10-CM

## 2019-06-26 DIAGNOSIS — G89.29 CHRONIC BILATERAL LOW BACK PAIN WITH BILATERAL SCIATICA: ICD-10-CM

## 2019-06-26 PROCEDURE — 97110 THERAPEUTIC EXERCISES: CPT | Mod: PO

## 2019-06-26 NOTE — PROGRESS NOTES
"  Physical Therapy Daily Treatment Note     Name: Adriana Strong  Clinic Number: [id number]    Therapy Diagnosis:   Encounter Diagnoses   Name Primary?    Chronic bilateral low back pain with bilateral sciatica     Difficulty walking     Muscle weakness     Balance problems      Physician: Sean Saravia MD    Visit Date: 6/26/2019    Physician Orders: PT Eval and Treat   Medical Diagnosis from Referral: M48.062 (ICD-10-CM) - Spinal stenosis of lumbar region with neurogenic claudication  Evaluation Date: 6/6/2019  Authorization Period Expiration: 5/21/20  Plan of Care Expiration: 8/6/19  Visit # / Visits authorized: 6/ 12    Time In: 9:00 am  Time Out: 9:40 am  Total Billable Time: 25 minutes    Precautions: Standard and Fall    Subjective     Pt reports: her back pain is about the same.   She was compliant with home exercise program.  Response to previous treatment: muscle soreness  Functional change: no change at this time    Pain: 4/10  Location: bilateral back      Objective     Adriana received therapeutic exercises to develop strength, endurance, ROM, flexibility, posture and core stabilization for 25 minutes including:      Date  06//26/19 06/24/19 06/19/19 6/17/19 6/12/19 6/10/19   VISIT 6/12 5/12 4/12 4 3 2/pending   G CODE VISIT 7/10 6/10 5/10 4/10 310 2/10   POC EXP. DATE 08/06/19 08/06/19 08/06/19 8/6/19 8/6/19 8/6/19   VISIT AMOUNT  MEDICARE TOTAL 60.64  477.04 90.96  416.40 60.64  325.44 60.64  264.80 60.64  204.16 60.64  143.52   FACE-TO-FACE 07/06/19 07/06/19 07/06/19 7/6/19 7/6/19 7/6/19   FOTO     3/5 2/5            Recumbent bike seat 11 L1 x 5' L1 x 5' L1 x 5' 15 minutes 8 minutes 8 minutes   TABLE:         Quad sets 3 x 10 x 3" 3 x 10 x 3" 3 x 10 x 3" 3 x 10 x 3" 3 x 10 x 3" 2 x10 x 3"   TA 2 x 10 x 3" 2 x 10 x 3" 3 x 10 x 3" 3 x 10 x 3" 3 x 10 x 3" 2 x 10 x 3"   bridging 2 x 10 2 x 10 2 x 10 2 x 10 1 x 10 2 x 5   LTR 2 x 10 2 x 10 2 x 10 2  X 10 2 x 10 2 x 10   TA with March 2 x 10 2 x 10 " "Not today 2 x 10 Next?    Hip abduction - seated 3 x 10 RTB 3 x 10 RTB Not today 3 x 10 RTB 2 x 10 RTB    Hip adduction - seated 30 x 3" w/ ball 30 x 3" w/ ball Not today 30 x 3" w/ball 20 x 3" w/ball    SEATED:         LAQ 2 x 10 2 x 10 2 x 10 2 x 10 2 x 10 2 x 10   TA  1 x 10 1 x 10 1 x 10 1 x 10 1 x 10 1 x 10   TA with march 2 x 10 2 x 10 2 x 10 2 x 10 2 x 10 2 x 10   STANDING:                                             Initials GWA 3/6 GWA 2/6 GWA 1/6 MA MA MA       Adriana received the following manual therapy techniques: were applied to the: lumbar and bilateral LE's for 5 minutes, including: NOT TODAY  Manual stretching of bilateral hamstrings  Manual single knee to chest    Home Exercises Provided and Patient Education Provided     Education provided:   - again, patient was instructed on proper breathing and frequent rest breaks during exercises    Written Home Exercises Provided: yes.  Exercises were reviewed and Adriana was able to demonstrate them prior to the end of the session.  Adriana demonstrated good  understanding of the education provided.     See EMR under Patient Instructions for exercises provided 06/06/2019.    Assessment     Patient continues to have difficulty breathing, feeling dizzy and requires frequent rest breaks.  Patient performed above exercise program with verbal cueing for proper technique.  Patient was able to complete her exercises by performing one lying down followed by one sitting up.    Adriana is progressing well towards her goals.   Pt prognosis is Good.     Pt will continue to benefit from skilled outpatient physical therapy to address the deficits listed in the problem list box on initial evaluation, provide pt/family education and to maximize pt's level of independence in the home and community environment.     Pt's spiritual, cultural and educational needs considered and pt agreeable to plan of care and goals.     Anticipated barriers to physical therapy: " none    Goals:   Short Term Goals: 4 weeks  1. Patient will be compliant with HEP to promote the independent management of current diagnosis. In progress  2. Patient will increase lumbar forward bending to reach mid shin level for function of dressing.  In progress  3. Patient will improve bilateral hamstring flexibility to (30)deg during 90/90 testing.  In progress  4. Patient will report a decrease in complaints of low back pain from 10/10 to 6/10 during ADLs. In progress    Long Term Goals:  8 weeks  1. Patient will improve core stabilization strength to Good to improve tolerance to normal house work activities for self care independence. In progress  2. Patient will increase standing balance to Good. In progress  3. Patient will report a decrease in complaints of low back pain from 10/10 to 4/10 during ADLs. In progress  4. Patient will improve FOTO limitation status from 58% to 45% placing the patient in the stage 3 impaired, limited, or restricted category indicating increased functional mobility. In progress  5.   Plan     Progress as patient is able.    Rufino Newell, PTA

## 2019-07-01 ENCOUNTER — CLINICAL SUPPORT (OUTPATIENT)
Dept: REHABILITATION | Facility: HOSPITAL | Age: 74
End: 2019-07-01
Attending: PSYCHIATRY & NEUROLOGY
Payer: MEDICARE

## 2019-07-01 DIAGNOSIS — M62.81 MUSCLE WEAKNESS: ICD-10-CM

## 2019-07-01 DIAGNOSIS — M54.41 CHRONIC BILATERAL LOW BACK PAIN WITH BILATERAL SCIATICA: ICD-10-CM

## 2019-07-01 DIAGNOSIS — G89.29 CHRONIC BILATERAL LOW BACK PAIN WITH BILATERAL SCIATICA: ICD-10-CM

## 2019-07-01 DIAGNOSIS — M54.42 CHRONIC BILATERAL LOW BACK PAIN WITH BILATERAL SCIATICA: ICD-10-CM

## 2019-07-01 DIAGNOSIS — R26.2 DIFFICULTY WALKING: ICD-10-CM

## 2019-07-01 DIAGNOSIS — R26.89 BALANCE PROBLEMS: ICD-10-CM

## 2019-07-01 PROCEDURE — 97110 THERAPEUTIC EXERCISES: CPT | Mod: PO

## 2019-07-01 NOTE — PROGRESS NOTES
"  Physical Therapy Daily Treatment Note     Name: Adriana Strong  Clinic Number: [id number]    Therapy Diagnosis:   Encounter Diagnoses   Name Primary?    Chronic bilateral low back pain with bilateral sciatica     Difficulty walking     Muscle weakness     Balance problems      Physician: Sean Saravia MD    Visit Date: 7/1/2019    Physician Orders: PT Eval and Treat   Medical Diagnosis from Referral: M48.062 (ICD-10-CM) - Spinal stenosis of lumbar region with neurogenic claudication  Evaluation Date: 6/6/2019  Authorization Period Expiration: 5/21/20  Plan of Care Expiration: 8/6/19  Visit # / Visits authorized: 7/ 12    Time In: 9:00 am  Time Out: 9:50 am  Total Billable Time: 45 minutes    Precautions: Standard and Fall    Subjective     Pt reports: her back pain is about the same, maybe a little better.   She was compliant with home exercise program.  Response to previous treatment: muscle soreness  Functional change: no change at this time    Pain: 3/10  Location: bilateral back      Objective     Adriana received therapeutic exercises to develop strength, endurance, ROM, flexibility, posture and core stabilization for 45 minutes including:      Date  07/01/19 06//26/19 06/24/19 06/19/19 6/17/19 6/12/19 6/10/19   VISIT 7/12 6/12 5/12 4/12 4 3 2/pending   G CODE VISIT 7/10 7/10 6/10 5/10 4/10 310 2/10   POC EXP. DATE 08/06/19 08/06/19 08/06/19 08/06/19 8/6/19 8/6/19 8/6/19   VISIT AMOUNT  MEDICARE TOTAL 90.96  568.00 60.64  477.04 90.96  416.40 60.64  325.44 60.64  264.80 60.64  204.16 60.64  143.52   FACE-TO-FACE 07/06/19 07/06/19 07/06/19 07/06/19 7/6/19 7/6/19 7/6/19   FOTO      3/5 2/5             Recumbent bike seat 11 L1 x 5' L1 x 5' L1 x 5' L1 x 5' 15 minutes 8 minutes 8 minutes   MT See below         TABLE:          Quad sets 3 x 10 x 5" 3 x 10 x 3" 3 x 10 x 3" 3 x 10 x 3" 3 x 10 x 3" 3 x 10 x 3" 2 x10 x 3"   TA 2 x 10 x 3" 2 x 10 x 3" 2 x 10 x 3" 3 x 10 x 3" 3 x 10 x 3" 3 x 10 x 3" 2 x 10 x 3" " "  bridging 2 x 10 2 x 10 2 x 10 2 x 10 2 x 10 1 x 10 2 x 5   LTR 2 x 10 2 x 10 2 x 10 2 x 10 2  X 10 2 x 10 2 x 10   TA with March 2 x 10 2 x 10 2 x 10 Not today 2 x 10 Next?    Hip abduction - seated 3 x 10 RTB 3 x 10 RTB 3 x 10 RTB Not today 3 x 10 RTB 2 x 10 RTB    Hip adduction - seated 30 x 3" w/ ball 30 x 3" w/ ball 30 x 3" w/ ball Not today 30 x 3" w/ball 20 x 3" w/ball    SEATED:          LAQ 3 x 10 2 x 10 2 x 10 2 x 10 2 x 10 2 x 10 2 x 10   TA  1 x 10 1 x 10 1 x 10 1 x 10 1 x 10 1 x 10 1 x 10   TA with march 2 x 10 2 x 10 2 x 10 2 x 10 2 x 10 2 x 10 2 x 10   STANDING:                                                  Initials GWA 4/6 GWA 3/6 GWA 2/6 GWA 1/6 MA MA MA       Adriana received the following manual therapy techniques: were applied to the: lumbar and bilateral LE's for 5 minutes, including:   Manual stretching of bilateral hamstrings  Manual single knee to chest    Home Exercises Provided and Patient Education Provided     Education provided:   - again, patient was instructed on proper breathing and frequent rest breaks during exercises    Written Home Exercises Provided: yes.  Exercises were reviewed and Adriana was able to demonstrate them prior to the end of the session.  Adriana demonstrated good  understanding of the education provided.     See EMR under Patient Instructions for exercises provided 06/06/2019.    Assessment     Patient continues to have difficulty breathing, feeling dizzy and requires frequent rest breaks.  Patient performed above exercise program with verbal cueing for proper technique along with today's progressions.  Patient was able to complete her exercises by performing one lying down followed by one sitting up.    Adriana is progressing well towards her goals.   Pt prognosis is Good.     Pt will continue to benefit from skilled outpatient physical therapy to address the deficits listed in the problem list box on initial evaluation, provide pt/family education and to " maximize pt's level of independence in the home and community environment.     Pt's spiritual, cultural and educational needs considered and pt agreeable to plan of care and goals.     Anticipated barriers to physical therapy: none    Goals:   Short Term Goals: 4 weeks  1. Patient will be compliant with HEP to promote the independent management of current diagnosis. In progress  2. Patient will increase lumbar forward bending to reach mid shin level for function of dressing.  In progress  3. Patient will improve bilateral hamstring flexibility to (30)deg during 90/90 testing.  In progress  4. Patient will report a decrease in complaints of low back pain from 10/10 to 6/10 during ADLs. In progress    Long Term Goals:  8 weeks  1. Patient will improve core stabilization strength to Good to improve tolerance to normal house work activities for self care independence. In progress  2. Patient will increase standing balance to Good. In progress  3. Patient will report a decrease in complaints of low back pain from 10/10 to 4/10 during ADLs. In progress  4. Patient will improve FOTO limitation status from 58% to 45% placing the patient in the stage 3 impaired, limited, or restricted category indicating increased functional mobility. In progress    Plan     Progress as patient is able.    Rufino Newell, PTA

## 2019-07-03 ENCOUNTER — CLINICAL SUPPORT (OUTPATIENT)
Dept: REHABILITATION | Facility: HOSPITAL | Age: 74
End: 2019-07-03
Attending: PSYCHIATRY & NEUROLOGY
Payer: MEDICARE

## 2019-07-03 DIAGNOSIS — G89.29 CHRONIC BILATERAL LOW BACK PAIN WITH BILATERAL SCIATICA: ICD-10-CM

## 2019-07-03 DIAGNOSIS — R26.89 BALANCE PROBLEMS: ICD-10-CM

## 2019-07-03 DIAGNOSIS — R26.2 DIFFICULTY WALKING: ICD-10-CM

## 2019-07-03 DIAGNOSIS — M54.41 CHRONIC BILATERAL LOW BACK PAIN WITH BILATERAL SCIATICA: ICD-10-CM

## 2019-07-03 DIAGNOSIS — M62.81 MUSCLE WEAKNESS: ICD-10-CM

## 2019-07-03 DIAGNOSIS — M54.42 CHRONIC BILATERAL LOW BACK PAIN WITH BILATERAL SCIATICA: ICD-10-CM

## 2019-07-03 PROCEDURE — 97110 THERAPEUTIC EXERCISES: CPT | Mod: PO

## 2019-07-03 NOTE — PATIENT INSTRUCTIONS
Hamstring Curl: Resisted (Sitting)        Facing anchor with tubing on right ankle, leg straight out, bend knee.  Repeat 10 times per set. Do 1 sets per session. Do 2 sessions per day.      [email or web address]

## 2019-07-03 NOTE — PROGRESS NOTES
"  Physical Therapy Daily Treatment Note     Name: Adriana Strong  Clinic Number: [id number]    Therapy Diagnosis:   Encounter Diagnoses   Name Primary?    Chronic bilateral low back pain with bilateral sciatica     Difficulty walking     Muscle weakness     Balance problems      Physician: Sean Saravia MD    Visit Date: 7/3/2019    Physician Orders: PT Eval and Treat   Medical Diagnosis from Referral: M48.062 (ICD-10-CM) - Spinal stenosis of lumbar region with neurogenic claudication  Evaluation Date: 6/6/2019  Authorization Period Expiration: 5/21/20  Plan of Care Expiration: 8/6/19  Visit # / Visits authorized: 8/ 12    Time In: 9:00 am  Time Out: 9:55 am  Total Billable Time: 30 minutes    Precautions: Standard and Fall    Subjective     Pt reports: her back pain is about the same but it was worse this morning.  She was compliant with home exercise program.  Response to previous treatment: muscle soreness  Functional change: no change at this time    Pain: 3/10  Location: bilateral back      Objective     Adriana received therapeutic exercises to develop strength, endurance, ROM, flexibility, posture and core stabilization for 30 minutes including:      Date  07/03 07/01/19 06//26/19 06/24/19 06/19/19 6/17/19 6/12/19 6/10/19   VISIT 8/12 7/12 6/12 5/12 4/12 4 3 2/pending   G CODE VISIT 8/10 7/10 7/10 6/10 5/10 4/10 310 2/10   POC EXP. DATE 08/06/19 08/06/19 08/06/19 08/06/19 08/06/19 8/6/19 8/6/19 8/6/19   VISIT AMOUNT  MEDICARE TOTAL 60.64  628.64 90.96  568.00 60.64  477.04 90.96  416.40 60.64  325.44 60.64  264.80 60.64  204.16 60.64  143.52   FACE-TO-FACE 07/06/19 07/06/19 07/06/19 07/06/19 07/06/19 7/6/19 7/6/19 7/6/19   FOTO       3/5 2/5              MT See below See below         TABLE:           Quad sets 30 x 5" 3 x 10 x 5" 3 x 10 x 3" 3 x 10 x 3" 3 x 10 x 3" 3 x 10 x 3" 3 x 10 x 3" 2 x10 x 3"   TA 20 x 3" 2 x 10 x 3" 2 x 10 x 3" 2 x 10 x 3" 3 x 10 x 3" 3 x 10 x 3" 3 x 10 x 3" 2 x 10 x 3" " "  bridging 2 x 10 2 x 10 2 x 10 2 x 10 2 x 10 2 x 10 1 x 10 2 x 5   LTR 2 x 10 2 x 10 2 x 10 2 x 10 2 x 10 2  X 10 2 x 10 2 x 10   TA with March 2 x 10 2 x 10 2 x 10 2 x 10 Not today 2 x 10 Next?    Hip abduction - seated 2 x 10 GTB 3 x 10 RTB 3 x 10 RTB 3 x 10 RTB Not today 3 x 10 RTB 2 x 10 RTB    Hip adduction - seated 30 x 3" w/ ball 30 x 3" w/ ball 30 x 3" w/ ball 30 x 3" w/ ball Not today 30 x 3" w/ball 20 x 3" w/ball    SEATED:           LAQ 3 x 10 3 x 10 2 x 10 2 x 10 2 x 10 2 x 10 2 x 10 2 x 10   TA  1 x 10 1 x 10 1 x 10 1 x 10 1 x 10 1 x 10 1 x 10 1 x 10   TA with march 2 x 10 2 x 10 2 x 10 2 x 10 2 x 10 2 x 10 2 x 10 2 x 10   HS curls 1 x 10 RTB          STANDING:                                 Recumbent bike seat 11 L1 x 5' L1 x 5' L1 x 5' L1 x 5' L1 x 5' 15 minutes 8 minutes  8 minutes              Initials GWA 5/6 GWA 4/6 GWA 3/6 GWA 2/6 GWA 1/6 MA MA MA       Adriana received the following manual therapy techniques: were applied to the: lumbar and bilateral LE's for 5 minutes, including:   Manual stretching of bilateral hamstrings  Manual single knee to chest    Home Exercises Provided and Patient Education Provided     Education provided:   - keeping exercises in a pain free range.    Written Home Exercises Provided: yes.  Exercises were reviewed and Adriana was able to demonstrate them prior to the end of the session.  Adriana demonstrated good  understanding of the education provided.     See EMR under Patient Instructions for exercises provided 06/06/2019.    Assessment     Patient continues to have difficulty breathing, feeling dizzy and requires frequent rest breaks.  Patient performed above exercise program with verbal cueing for proper technique along with new exercise added and today's progressions.  Patient was able to complete her exercises by performing one lying down followed by one sitting up.    Adriana is progressing well towards her goals.   Pt prognosis is Good.     Pt will " continue to benefit from skilled outpatient physical therapy to address the deficits listed in the problem list box on initial evaluation, provide pt/family education and to maximize pt's level of independence in the home and community environment.     Pt's spiritual, cultural and educational needs considered and pt agreeable to plan of care and goals.     Anticipated barriers to physical therapy: none    Goals:   Short Term Goals: 4 weeks  1. Patient will be compliant with HEP to promote the independent management of current diagnosis. In progress  2. Patient will increase lumbar forward bending to reach mid shin level for function of dressing.  In progress  3. Patient will improve bilateral hamstring flexibility to (30)deg during 90/90 testing.  In progress  4. Patient will report a decrease in complaints of low back pain from 10/10 to 6/10 during ADLs. In progress    Long Term Goals:  8 weeks  1. Patient will improve core stabilization strength to Good to improve tolerance to normal house work activities for self care independence. In progress  2. Patient will increase standing balance to Good. In progress  3. Patient will report a decrease in complaints of low back pain from 10/10 to 4/10 during ADLs. In progress  4. Patient will improve FOTO limitation status from 58% to 45% placing the patient in the stage 3 impaired, limited, or restricted category indicating increased functional mobility. In progress    Plan     Increase reps on HS curls next visit.    Rufino Newell, PTA

## 2019-07-08 ENCOUNTER — CLINICAL SUPPORT (OUTPATIENT)
Dept: REHABILITATION | Facility: HOSPITAL | Age: 74
End: 2019-07-08
Attending: PSYCHIATRY & NEUROLOGY
Payer: MEDICARE

## 2019-07-08 ENCOUNTER — TELEPHONE (OUTPATIENT)
Dept: CARDIOLOGY | Facility: CLINIC | Age: 74
End: 2019-07-08

## 2019-07-08 ENCOUNTER — DOCUMENTATION ONLY (OUTPATIENT)
Dept: REHABILITATION | Facility: HOSPITAL | Age: 74
End: 2019-07-08

## 2019-07-08 DIAGNOSIS — R26.89 BALANCE PROBLEMS: ICD-10-CM

## 2019-07-08 DIAGNOSIS — R26.2 DIFFICULTY WALKING: ICD-10-CM

## 2019-07-08 DIAGNOSIS — M54.41 CHRONIC BILATERAL LOW BACK PAIN WITH BILATERAL SCIATICA: ICD-10-CM

## 2019-07-08 DIAGNOSIS — M54.42 CHRONIC BILATERAL LOW BACK PAIN WITH BILATERAL SCIATICA: ICD-10-CM

## 2019-07-08 DIAGNOSIS — G89.29 CHRONIC BILATERAL LOW BACK PAIN WITH BILATERAL SCIATICA: ICD-10-CM

## 2019-07-08 DIAGNOSIS — M62.81 MUSCLE WEAKNESS: ICD-10-CM

## 2019-07-08 PROCEDURE — 97110 THERAPEUTIC EXERCISES: CPT | Mod: PO

## 2019-07-08 NOTE — TELEPHONE ENCOUNTER
Spoke to patient. No labs ordered for next visit.    ----- Message from Livia Wilson sent at 7/8/2019 10:46 AM CDT -----  Contact: self, [phone]  Patient has an appointment scheduled on 7/30 and wants to know if she needs to have lab work done before. Please advise.

## 2019-07-08 NOTE — PROGRESS NOTES
Face to Face PTA Conference performed with Rufino Newell, [facility] regarding patient's current status, overall progress, and plan of care    Face to Face PTA Conference performed with Krzysztof Russell PT regarding patient's current status, overall progress, and plan of care    Rufino Newell  7/8/2019

## 2019-07-08 NOTE — PROGRESS NOTES
"  Physical Therapy Daily Treatment Note     Name: Adriana Strong  Clinic Number: [id number]    Therapy Diagnosis:   Encounter Diagnoses   Name Primary?    Chronic bilateral low back pain with bilateral sciatica     Difficulty walking     Muscle weakness     Balance problems      Physician: Sean Saravia MD    Visit Date: 7/8/2019    Physician Orders: PT Eval and Treat   Medical Diagnosis from Referral: M48.062 (ICD-10-CM) - Spinal stenosis of lumbar region with neurogenic claudication  Evaluation Date: 6/6/2019  Authorization Period Expiration: 5/21/20  Plan of Care Expiration: 8/6/19  Visit # / Visits authorized: 9/ 12    Time In: 9:00 am  Time Out: 9:40 am  Total Billable Time: 30 minutes    Precautions: Standard and Fall    Subjective     Pt reports: having increase in left sided low back pain this morning.    She was compliant with home exercise program.  Response to previous treatment: muscle soreness  Functional change: no change at this time    Pain: 4/10  Location: bilateral back      Objective     Adriana received therapeutic exercises to develop strength, endurance, ROM, flexibility, posture and core stabilization for 30 minutes including:      Date  7/8/19 07/03 07/01/19 06//26/19 06/24/19 06/19/19 6/17/19 6/12/19 6/10/19   VISIT 9/12 8/12 7/12 6/12 5/12 4/12 4 3 2/pending   G CODE VISIT 9/10 8/10 7/10 7/10 6/10 5/10 4/10 310 2/10   POC EXP. DATE 8/6/19 08/06/19 08/06/19 08/06/19 08/06/19 08/06/19 8/6/19 8/6/19 8/6/19   VISIT AMOUNT  MEDICARE TOTAL 60.64  689.28 60.64  628.64 90.96  568.00 60.64  477.04 90.96  416.40 60.64  325.44 60.64  264.80 60.64  204.16 60.64  143.52   FACE-TO-FACE 8/6/19 07/06/19 07/06/19 07/06/19 07/06/19 07/06/19 7/6/19 7/6/19 7/6/19   FOTO        3/5 2/5               MT See below See below See below         TABLE:            Quad sets 30 x 5" 30 x 5" 3 x 10 x 5" 3 x 10 x 3" 3 x 10 x 3" 3 x 10 x 3" 3 x 10 x 3" 3 x 10 x 3" 2 x10 x 3"   TA 2 x 10 x 3" 20 x 3" 2 x 10 x 3" 2 x " "10 x 3" 2 x 10 x 3" 3 x 10 x 3" 3 x 10 x 3" 3 x 10 x 3" 2 x 10 x 3"   bridging 2 x 10 2 x 10 2 x 10 2 x 10 2 x 10 2 x 10 2 x 10 1 x 10 2 x 5   LTR 2 x 10 2 x 10 2 x 10 2 x 10 2 x 10 2 x 10 2  X 10 2 x 10 2 x 10   TA with March 2 x 10 2 x 10 2 x 10 2 x 10 2 x 10 Not today 2 x 10 Next?    Hip abduction - seated 3 x 10 GTB 2 x 10 GTB 3 x 10 RTB 3 x 10 RTB 3 x 10 RTB Not today 3 x 10 RTB 2 x 10 RTB    Hip adduction - seated 30 x 3" w/ball 30 x 3" w/ ball 30 x 3" w/ ball 30 x 3" w/ ball 30 x 3" w/ ball Not today 30 x 3" w/ball 20 x 3" w/ball    SEATED:            LAQ 3 x 10 x 1# 3 x 10 3 x 10 2 x 10 2 x 10 2 x 10 2 x 10 2 x 10 2 x 10   TA  1 x 10 1 x 10 1 x 10 1 x 10 1 x 10 1 x 10 1 x 10 1 x 10 1 x 10   TA with march 2 x 10 2 x 10 2 x 10 2 x 10 2 x 10 2 x 10 2 x 10 2 x 10 2 x 10   HS curls 1 x 10 RTB 1 x 10 RTB          STANDING:                                    Recumbent bike seat 11 L1 x 5' L1 x 5' L1 x 5' L1 x 5' L1 x 5' L1 x 5' 15 minutes 8 minutes  8 minutes               Initials MA GWA 5/6 GWA 4/6 GWA 3/6 GWA 2/6 GWA 1/6 MA YESSI Wright received the following manual therapy techniques: were applied to the: lumbar and bilateral LE's for 5 minutes, including:   Manual stretching of bilateral hamstrings  Manual single knee to chest    Home Exercises Provided and Patient Education Provided     Education provided:   - keeping exercises in a pain free range.    Written Home Exercises Provided: yes.  Exercises were reviewed and Adriana was able to demonstrate them prior to the end of the session.  Adriana demonstrated good  understanding of the education provided.     See EMR under Patient Instructions for exercises provided 06/06/2019.    Assessment     Patient has continued shortness of breath causing frequent rest breaks and limiting tolerance to progression. Pt is noted to have antalgic gait with weight shifted to right on this date.  She is able to progress with LAQ and seated resisted hip abduction.  " She will continue with current treatment plan as tolerated.    Adriana is progressing well towards her goals.   Pt prognosis is Good.     Pt will continue to benefit from skilled outpatient physical therapy to address the deficits listed in the problem list box on initial evaluation, provide pt/family education and to maximize pt's level of independence in the home and community environment.     Pt's spiritual, cultural and educational needs considered and pt agreeable to plan of care and goals.     Anticipated barriers to physical therapy: none    Goals:   Short Term Goals: 4 weeks  1. Patient will be compliant with HEP to promote the independent management of current diagnosis. In progress  2. Patient will increase lumbar forward bending to reach mid shin level for function of dressing.  In progress  3. Patient will improve bilateral hamstring flexibility to (30)deg during 90/90 testing.  In progress  4. Patient will report a decrease in complaints of low back pain from 10/10 to 6/10 during ADLs. In progress    Long Term Goals:  8 weeks  1. Patient will improve core stabilization strength to Good to improve tolerance to normal house work activities for self care independence. In progress  2. Patient will increase standing balance to Good. In progress  3. Patient will report a decrease in complaints of low back pain from 10/10 to 4/10 during ADLs. In progress  4. Patient will improve FOTO limitation status from 58% to 45% placing the patient in the stage 3 impaired, limited, or restricted category indicating increased functional mobility. In progress    Plan     Increase reps on HS curls next visit.    Krzysztof Russell, PT

## 2019-07-10 ENCOUNTER — CLINICAL SUPPORT (OUTPATIENT)
Dept: REHABILITATION | Facility: HOSPITAL | Age: 74
End: 2019-07-10
Attending: PSYCHIATRY & NEUROLOGY
Payer: MEDICARE

## 2019-07-10 DIAGNOSIS — R26.89 BALANCE PROBLEMS: ICD-10-CM

## 2019-07-10 DIAGNOSIS — R26.2 DIFFICULTY WALKING: ICD-10-CM

## 2019-07-10 DIAGNOSIS — G89.29 CHRONIC BILATERAL LOW BACK PAIN WITH BILATERAL SCIATICA: ICD-10-CM

## 2019-07-10 DIAGNOSIS — M62.81 MUSCLE WEAKNESS: ICD-10-CM

## 2019-07-10 DIAGNOSIS — M54.42 CHRONIC BILATERAL LOW BACK PAIN WITH BILATERAL SCIATICA: ICD-10-CM

## 2019-07-10 DIAGNOSIS — M54.41 CHRONIC BILATERAL LOW BACK PAIN WITH BILATERAL SCIATICA: ICD-10-CM

## 2019-07-10 PROCEDURE — G8979 MOBILITY GOAL STATUS: HCPCS | Mod: CK,PO

## 2019-07-10 PROCEDURE — 97110 THERAPEUTIC EXERCISES: CPT | Mod: PO

## 2019-07-10 PROCEDURE — G8978 MOBILITY CURRENT STATUS: HCPCS | Mod: CK,PO

## 2019-07-10 NOTE — PROGRESS NOTES
Physical Therapy Daily Treatment Note     Name: Adriana Mendozaurg  Clinic Number: [id number]    Therapy Diagnosis:   Encounter Diagnoses   Name Primary?    Chronic bilateral low back pain with bilateral sciatica     Difficulty walking     Muscle weakness     Balance problems      Physician: Sean Saravia MD    Visit Date: 7/10/2019    Physician Orders: PT Eval and Treat   Medical Diagnosis from Referral: M48.062 (ICD-10-CM) - Spinal stenosis of lumbar region with neurogenic claudication  Evaluation Date: 6/6/2019  Authorization Period Expiration: 5/21/20  Plan of Care Expiration: 8/6/19  Visit # / Visits authorized: 10/ 12    Time In: 9:00 am  Time Out: 9:40 am  Total Billable Time: 30 minutes    Precautions: Standard and Fall    Subjective     Pt reports: decreased back pain recently but having increased difficuulty breathing and increase in blood pressure.     She was compliant with home exercise program.  Response to previous treatment: muscle soreness  Functional change: no change at this time    Pain: 3/10  Location: bilateral back      Objective     Pt performs FOTO survey on this date.  Her limitation status is 59% limitation placing her in CK 40-60% limited.     Assessment     Patient is reporting recent increase in difficulty breathing which may have limited her progress in FOTO status.  She is reporting overall decrease in low back pain since beginning therapy.    Adriana is progressing well towards her goals.   Pt prognosis is Good.     Pt will continue to benefit from skilled outpatient physical therapy to address the deficits listed in the problem list box on initial evaluation, provide pt/family education and to maximize pt's level of independence in the home and community environment.     Pt's spiritual, cultural and educational needs considered and pt agreeable to plan of care and goals.     Anticipated barriers to physical therapy: none    Goals:   Short Term Goals: 4 weeks  1. Patient will be  compliant with HEP to promote the independent management of current diagnosis. In progress  2. Patient will increase lumbar forward bending to reach mid shin level for function of dressing.  In progress  3. Patient will improve bilateral hamstring flexibility to (30)deg during 90/90 testing.  In progress  4. Patient will report a decrease in complaints of low back pain from 10/10 to 6/10 during ADLs. In progress    Long Term Goals:  8 weeks  1. Patient will improve core stabilization strength to Good to improve tolerance to normal house work activities for self care independence. In progress  2. Patient will increase standing balance to Good. In progress  3. Patient will report a decrease in complaints of low back pain from 10/10 to 4/10 during ADLs. In progress  4. Patient will improve FOTO limitation status from 58% to 45% placing the patient in the stage 3 impaired, limited, or restricted category indicating increased functional mobility. In progress    Plan     Increase reps on HS curls next visit.    Krzysztof Russell, PT

## 2019-07-15 ENCOUNTER — CLINICAL SUPPORT (OUTPATIENT)
Dept: REHABILITATION | Facility: HOSPITAL | Age: 74
End: 2019-07-15
Attending: PSYCHIATRY & NEUROLOGY
Payer: MEDICARE

## 2019-07-15 DIAGNOSIS — R26.2 DIFFICULTY WALKING: ICD-10-CM

## 2019-07-15 DIAGNOSIS — M54.41 CHRONIC BILATERAL LOW BACK PAIN WITH BILATERAL SCIATICA: ICD-10-CM

## 2019-07-15 DIAGNOSIS — R26.89 BALANCE PROBLEMS: ICD-10-CM

## 2019-07-15 DIAGNOSIS — M62.81 MUSCLE WEAKNESS: ICD-10-CM

## 2019-07-15 DIAGNOSIS — G89.29 CHRONIC BILATERAL LOW BACK PAIN WITH BILATERAL SCIATICA: ICD-10-CM

## 2019-07-15 DIAGNOSIS — M54.42 CHRONIC BILATERAL LOW BACK PAIN WITH BILATERAL SCIATICA: ICD-10-CM

## 2019-07-15 PROCEDURE — 97110 THERAPEUTIC EXERCISES: CPT | Mod: PO

## 2019-07-15 NOTE — PROGRESS NOTES
Physical Therapy Daily Treatment Note     Name: Adriana Strong  Clinic Number: [id number]    Therapy Diagnosis:   Encounter Diagnoses   Name Primary?    Chronic bilateral low back pain with bilateral sciatica     Difficulty walking     Muscle weakness     Balance problems      Physician: Sean Saravia MD    Visit Date: 7/15/2019    Physician Orders: PT Eval and Treat   Medical Diagnosis from Referral: M48.062 (ICD-10-CM) - Spinal stenosis of lumbar region with neurogenic claudication  Evaluation Date: 6/6/2019  Authorization Period Expiration: 5/21/20  Plan of Care Expiration: 8/6/19  Visit # / Visits authorized: 11/ 12    Time In: 9:00 am  Time Out: 9:45 am  Total Billable Time: 30 minutes    Precautions: Standard and Fall    Subjective     Pt reports: decreased low back pain this morning.  She continues to have weakness in LE's with occasional buckling of knees during prolonged walking.  She would like to continue with therapy in order to progress strengthening exercises.   She was compliant with home exercise program.  Response to previous treatment: muscle soreness  Functional change: no change at this time    Pain: 0/10  Location: bilateral back      Objective     Adriana received therapeutic exercises to develop strength, endurance, ROM, flexibility, posture and core stabilization for 40 minutes including:      Date  07/15/19 07/10/19 7/8/19 07/03 07/01/19 06//26/19 06/24/19 06/19/19 6/17/19 6/12/19 6/10/19   VISIT 11/12 10/12 9/12 8/12 7/12 6/12 5/12 4/12 4 3 2/pending   G CODE VISIT 2/10 1/10 9/10 8/10 7/10 7/10 6/10 5/10 4/10 310 2/10   POC EXP. DATE 08/06/19 08/06/19 8/6/19 08/06/19 08/06/19 08/06/19 08/06/19 08/06/19 8/6/19 8/6/19 8/6/19   VISIT AMOUNT  MEDICARE TOTAL 60.64  840.88 90.96  780.24 60.64  689.28 60.64  628.64 90.96  568.00 60.64  477.04 90.96  416.40 60.64  325.44 60.64  264.80 60.64  204.16 60.64  143.52   FACE-TO-FACE 08/06/19 08/06/19 8/6/19 07/06/19 07/06/19 07/06/19 07/06/19  "07/06/19 7/6/19 7/6/19 7/6/19   FOTO          3/5 2/5                 MT See below Not today See below See below See below         TABLE:              Quad sets 30 x 5" 30 x 5" 30 x 5" 30 x 5" 3 x 10 x 5" 3 x 10 x 3" 3 x 10 x 3" 3 x 10 x 3" 3 x 10 x 3" 3 x 10 x 3" 2 x10 x 3"   TA 20 x 3" 20 x 3" 2 x 10 x 3" 20 x 3" 2 x 10 x 3" 2 x 10 x 3" 2 x 10 x 3" 3 x 10 x 3" 3 x 10 x 3" 3 x 10 x 3" 2 x 10 x 3"   bridging 2 x 10 2 x 10 2 x 10 2 x 10 2 x 10 2 x 10 2 x 10 2 x 10 2 x 10 1 x 10 2 x 5   LTR 2 x 10 2 x 10 2 x 10 2 x 10 2 x 10 2 x 10 2 x 10 2 x 10 2  X 10 2 x 10 2 x 10   TA with March 2 x 10 2 x 10 2 x 10 2 x 10 2 x 10 2 x 10 2 x 10 Not today 2 x 10 Next?    Hip abduction - seated 3 x 10 GTB 3 x 10 GTB 3 x 10 GTB 2 x 10 GTB 3 x 10 RTB 3 x 10 RTB 3 x 10 RTB Not today 3 x 10 RTB 2 x 10 RTB    Hip adduction - seated 30 x 3" w/ball 30 x 3" w/ ball 30 x 3" w/ball 30 x 3" w/ ball 30 x 3" w/ ball 30 x 3" w/ ball 30 x 3" w/ ball Not today 30 x 3" w/ball 20 x 3" w/ball    SEATED:              LAQ 3 x 10 x 1# 3 x 10 x 0# 3 x 10 x 1# 3 x 10 3 x 10 2 x 10 2 x 10 2 x 10 2 x 10 2 x 10 2 x 10   TA  1 x 10 1 x 10 1 x 10 1 x 10 1 x 10 1 x 10 1 x 10 1 x 10 1 x 10 1 x 10 1 x 10   TA with march 2 x 10 2 x 10 2 x 10 2 x 10 2 x 10 2 x 10 2 x 10 2 x 10 2 x 10 2 x 10 2 x 10   HS curls 2 x 10 RTB 2 x 10 RTB 1 x 10 RTB 1 x 10 RTB          STANDING:                                          Recumbent bike seat 11 L2 x 5 Not today L1 x 5' L1 x 5' L1 x 5' L1 x 5' L1 x 5' L1 x 5' 15 minutes 8 minutes  8 minutes                 Initials MA GWA 1/6 MA GWA 5/6 GWA 4/6 GWA 3/6 GWA 2/6 GWA 1/6 MA MA MA        Lumbar Range of Motion:     Degrees Pain   Flexion Reaches to mid shin    Douglas increase in pain         Extension Not tested    Not tested         Left Side Bending Reaches to mid thigh Increased low back pain         Right Side Bending Reaches to mid thigh  no increase inpain         Left rotation    Not tested  Not tested         Right Rotation    " Not tested Not tested               Lower Extremity Strength  Right LE   Left LE     Knee extension: 4-/5 Knee extension: 4/5   Knee flexion: 4-/5 Knee flexion: 4-/5   Hip flexion: 3+/5 Hip flexion: 4-/5   Hip extension:  Not tested Hip extension: Not tested   Hip abduction: 3+/5 Hip abduction: 3+/5   Hip adduction: 4-/5 Hip adduction 4-/5   Ankle dorsiflexion: 4-/5 Ankle dorsiflexion: 4/5   Ankle plantarflexion: 4-/5 Ankle plantarflexion: 4-/5        Adriana received the following manual therapy techniques: were applied to the: lumbar and bilateral LE's for 5 minutes, including:   Manual stretching of bilateral hamstrings  Manual single knee to chest    Home Exercises Provided and Patient Education Provided     Education provided:   - keeping exercises in a pain free range.    Written Home Exercises Provided: yes.  Exercises were reviewed and Adriana was able to demonstrate them prior to the end of the session.  Adriana demonstrated good  understanding of the education provided.     See EMR under Patient Instructions for exercises provided 06/06/2019.    Assessment     Pt presents to therapy on this date with decreased back pain and reporting decrease in blood pressure this morning.  She continues to have limited tolerance to supine exercises due to difficulty breathing and requires frequent sitting rest breaks.  Adriana is noted to have improved lumbar forward bending as compared to evaluation as well as improving LE strength.    Adriana is progressing well towards her goals.   Pt prognosis is Good.     Pt will continue to benefit from skilled outpatient physical therapy to address the deficits listed in the problem list box on initial evaluation, provide pt/family education and to maximize pt's level of independence in the home and community environment.     Pt's spiritual, cultural and educational needs considered and pt agreeable to plan of care and goals.     Anticipated barriers to physical therapy:  none    Goals:   Short Term Goals: 4 weeks  1. Patient will be compliant with HEP to promote the independent management of current diagnosis. MET  2. Patient will increase lumbar forward bending to reach mid shin level for function of dressing.  MET  3. Patient will improve bilateral hamstring flexibility to (30)deg during 90/90 testing.  In progress  4. Patient will report a decrease in complaints of low back pain from 10/10 to 6/10 during ADLs. MET    Long Term Goals:  8 weeks  1. Patient will improve core stabilization strength to Good to improve tolerance to normal house work activities for self care independence. In progress  2. Patient will increase standing balance to Good. In progress  3. Patient will report a decrease in complaints of low back pain from 10/10 to 4/10 during ADLs. In progress  4. Patient will improve FOTO limitation status from 58% to 45% placing the patient in the stage 3 impaired, limited, or restricted category indicating increased functional mobility. In progress    Plan     Progress resistance of knee flexion, LAQ, and attempt standing exercises next visit.     Krzysztof Russell, PT

## 2019-07-17 ENCOUNTER — CLINICAL SUPPORT (OUTPATIENT)
Dept: REHABILITATION | Facility: HOSPITAL | Age: 74
End: 2019-07-17
Attending: PSYCHIATRY & NEUROLOGY
Payer: MEDICARE

## 2019-07-17 DIAGNOSIS — M54.42 CHRONIC BILATERAL LOW BACK PAIN WITH BILATERAL SCIATICA: ICD-10-CM

## 2019-07-17 DIAGNOSIS — R26.2 DIFFICULTY WALKING: ICD-10-CM

## 2019-07-17 DIAGNOSIS — G89.29 CHRONIC BILATERAL LOW BACK PAIN WITH BILATERAL SCIATICA: ICD-10-CM

## 2019-07-17 DIAGNOSIS — R26.89 BALANCE PROBLEMS: ICD-10-CM

## 2019-07-17 DIAGNOSIS — M62.81 MUSCLE WEAKNESS: ICD-10-CM

## 2019-07-17 DIAGNOSIS — M54.41 CHRONIC BILATERAL LOW BACK PAIN WITH BILATERAL SCIATICA: ICD-10-CM

## 2019-07-17 PROCEDURE — 97110 THERAPEUTIC EXERCISES: CPT | Mod: PO

## 2019-07-17 NOTE — PROGRESS NOTES
Physical Therapy Daily Treatment Note     Name: Adriana Strong  Clinic Number: [id number]    Therapy Diagnosis:   Encounter Diagnoses   Name Primary?    Chronic bilateral low back pain with bilateral sciatica     Difficulty walking     Muscle weakness     Balance problems      Physician: Sean Saravia MD    Visit Date: 7/17/2019    Physician Orders: PT Eval and Treat   Medical Diagnosis from Referral: M48.062 (ICD-10-CM) - Spinal stenosis of lumbar region with neurogenic claudication  Evaluation Date: 6/6/2019  Authorization Period Expiration: 5/21/20  Plan of Care Expiration: 8/6/19  Visit # / Visits authorized: 12/ 12    Time In: 9:00 am  Time Out: 9:45 am  Total Billable Time: 45 minutes    Precautions: Standard and Fall    Subjective     Pt reports: having dizziness following sit to stand exercise and continues to report difficulty breathing in supine position.  Pt reporting slight increase in normal low back pain on this date.   She was compliant with home exercise program.  Response to previous treatment: muscle soreness  Functional change: no change at this time    Pain: 3/10  Location: bilateral back      Objective     Adriana received therapeutic exercises to develop strength, endurance, ROM, flexibility, posture and core stabilization for 40 minutes including:      Date  07/17/19 07/15/19 07/10/19 7/8/19 07/03 07/01/19 06//26/19 06/24/19 06/19/19 6/17/19 6/12/19 6/10/19   VISIT 12/12 11/12 10/12 9/12 8/12 7/12 6/12 5/12 4/12 4 3 2/pending   G CODE VISIT 3/10 2/10 1/10 9/10 8/10 7/10 7/10 6/10 5/10 4/10 310 2/10   POC EXP. DATE 08/06/19 08/06/19 08/06/19 8/6/19 08/06/19 08/06/19 08/06/19 08/06/19 08/06/19 8/6/19 8/6/19 8/6/19   VISIT AMOUNT  MEDICARE TOTAL 90.96  931.84 60.64  840.88 90.96  780.24 60.64  689.28 60.64  628.64 90.96  568.00 60.64  477.04 90.96  416.40 60.64  325.44 60.64  264.80 60.64  204.16 60.64  143.52   FACE-TO-FACE 08/06/19 08/06/19 08/06/19 8/6/19 07/06/19 07/06/19 07/06/19  "07/06/19 07/06/19 7/6/19 7/6/19 7/6/19   FOTO           3/5 2/5                  MT See below See below Not today See below See below See below         TABLE:               Quad sets 30 x 5" 30 x 5" 30 x 5" 30 x 5" 30 x 5" 3 x 10 x 5" 3 x 10 x 3" 3 x 10 x 3" 3 x 10 x 3" 3 x 10 x 3" 3 x 10 x 3" 2 x10 x 3"   TA 20 x 3" 20 x 3" 20 x 3" 2 x 10 x 3" 20 x 3" 2 x 10 x 3" 2 x 10 x 3" 2 x 10 x 3" 3 x 10 x 3" 3 x 10 x 3" 3 x 10 x 3" 2 x 10 x 3"   bridging 2 x 10 2 x 10 2 x 10 2 x 10 2 x 10 2 x 10 2 x 10 2 x 10 2 x 10 2 x 10 1 x 10 2 x 5   LTR 2 x 10 2 x 10 2 x 10 2 x 10 2 x 10 2 x 10 2 x 10 2 x 10 2 x 10 2  X 10 2 x 10 2 x 10   TA with March 2 x 10 2 x 10 2 x 10 2 x 10 2 x 10 2 x 10 2 x 10 2 x 10 Not today 2 x 10 Next?    Hip abduction - seated 3 x 10 BTB 3 x 10 GTB 3 x 10 GTB 3 x 10 GTB 2 x 10 GTB 3 x 10 RTB 3 x 10 RTB 3 x 10 RTB Not today 3 x 10 RTB 2 x 10 RTB    Hip adduction - seated 30 x 3" w/ball 30 x 3" w/ball 30 x 3" w/ ball 30 x 3" w/ball 30 x 3" w/ ball 30 x 3" w/ ball 30 x 3" w/ ball 30 x 3" w/ ball Not today 30 x 3" w/ball 20 x 3" w/ball    SEATED:               LAQ 3 x 10 x 1.5# 3 x 10 x 1# 3 x 10 x 0# 3 x 10 x 1# 3 x 10 3 x 10 2 x 10 2 x 10 2 x 10 2 x 10 2 x 10 2 x 10   TA  1 x 10 1 x 10 1 x 10 1 x 10 1 x 10 1 x 10 1 x 10 1 x 10 1 x 10 1 x 10 1 x 10 1 x 10   TA with march 2 x 10 2 x 10 2 x 10 2 x 10 2 x 10 2 x 10 2 x 10 2 x 10 2 x 10 2 x 10 2 x 10 2 x 10   HS curls 3 x 10 GTB 2 x 10 RTB 2 x 10 RTB 1 x 10 RTB 1 x 10 RTB          STANDING:               Sit to stand 2 x 5              Hip abduction 1 x 10                                                           Recumbent bike seat 11 L1 x 5 L2 x 5 Not today L1 x 5' L1 x 5' L1 x 5' L1 x 5' L1 x 5' L1 x 5' 15 minutes 8 minutes  8 minutes                  Initials MA MA GWA 1/6 MA GWA 5/6 GWA 4/6 GWA 3/6 GWA 2/6 GWA 1/6 MA MA YESSI Wright received the following manual therapy techniques: were applied to the: lumbar and bilateral LE's for 5 minutes, including: "   Manual stretching of bilateral hamstrings  Manual single knee to chest    Home Exercises Provided and Patient Education Provided     Education provided:   - keeping exercises in a pain free range.    Written Home Exercises Provided: yes.  Exercises were reviewed and Adriana was able to demonstrate them prior to the end of the session.  Adriana demonstrated good  understanding of the education provided.     See EMR under Patient Instructions for exercises provided 06/06/2019.    Assessment     Pt is progressed to sit to stand from low mat and standing hip abduction exercises but had complaints of increased dizziness following repetitive sit to stand.  She is able to progress with LE strengthening exercises in seated position.  Adriana continues to have difficulty breathing while supine which is limiting progression of core stabilization exercises.      Adriana is progressing well towards her goals.   Pt prognosis is Good.     Pt will continue to benefit from skilled outpatient physical therapy to address the deficits listed in the problem list box on initial evaluation, provide pt/family education and to maximize pt's level of independence in the home and community environment.     Pt's spiritual, cultural and educational needs considered and pt agreeable to plan of care and goals.     Anticipated barriers to physical therapy: none    Goals:   Short Term Goals: 4 weeks  1. Patient will be compliant with HEP to promote the independent management of current diagnosis. MET  2. Patient will increase lumbar forward bending to reach mid shin level for function of dressing.  MET  3. Patient will improve bilateral hamstring flexibility to (30)deg during 90/90 testing.  In progress  4. Patient will report a decrease in complaints of low back pain from 10/10 to 6/10 during ADLs. MET    Long Term Goals:  8 weeks  1. Patient will improve core stabilization strength to Good to improve tolerance to normal house work  activities for self care independence. In progress  2. Patient will increase standing balance to Good. In progress  3. Patient will report a decrease in complaints of low back pain from 10/10 to 4/10 during ADLs. In progress  4. Patient will improve FOTO limitation status from 58% to 45% placing the patient in the stage 3 impaired, limited, or restricted category indicating increased functional mobility. In progress    Plan     Continue with progression of strengthening, attempt standing hip abduction, extension prior to normal exercises next visit.    Krzysztof Russell, PT

## 2019-07-22 ENCOUNTER — CLINICAL SUPPORT (OUTPATIENT)
Dept: REHABILITATION | Facility: HOSPITAL | Age: 74
End: 2019-07-22
Attending: PSYCHIATRY & NEUROLOGY
Payer: MEDICARE

## 2019-07-22 DIAGNOSIS — M62.81 MUSCLE WEAKNESS: ICD-10-CM

## 2019-07-22 DIAGNOSIS — G89.29 CHRONIC BILATERAL LOW BACK PAIN WITH BILATERAL SCIATICA: ICD-10-CM

## 2019-07-22 DIAGNOSIS — M54.41 CHRONIC BILATERAL LOW BACK PAIN WITH BILATERAL SCIATICA: ICD-10-CM

## 2019-07-22 DIAGNOSIS — M54.42 CHRONIC BILATERAL LOW BACK PAIN WITH BILATERAL SCIATICA: ICD-10-CM

## 2019-07-22 DIAGNOSIS — R26.2 DIFFICULTY WALKING: ICD-10-CM

## 2019-07-22 DIAGNOSIS — R26.89 BALANCE PROBLEMS: ICD-10-CM

## 2019-07-22 PROCEDURE — 97110 THERAPEUTIC EXERCISES: CPT | Mod: PO

## 2019-07-22 NOTE — PROGRESS NOTES
Physical Therapy Daily Treatment Note     Name: Adriana Strong  Clinic Number: [id number]    Therapy Diagnosis:   Encounter Diagnoses   Name Primary?    Chronic bilateral low back pain with bilateral sciatica     Difficulty walking     Muscle weakness     Balance problems      Physician: Sean Saravia MD    Visit Date: 7/22/2019    Physician Orders: PT Eval and Treat   Medical Diagnosis from Referral: M48.062 (ICD-10-CM) - Spinal stenosis of lumbar region with neurogenic claudication  Evaluation Date: 6/6/2019  Authorization Period Expiration: 5/21/20  Plan of Care Expiration: 8/6/19  Visit # / Visits authorized: 13/ 12    Time In: 9:00 am  Time Out: 9:55 am  Total Billable Time: 35 minutes    Precautions: Standard and Fall    Subjective     Pt reports: having continued mild low back pain that she rates at 3/10.    She was compliant with home exercise program.  Response to previous treatment: muscle soreness  Functional change: no change at this time    Pain: 3/10  Location: bilateral back      Objective     Adriana received therapeutic exercises to develop strength, endurance, ROM, flexibility, posture and core stabilization for 40 minutes including:      Date  7/22/19 07/17/19 07/15/19 07/10/19 7/8/19 07/03 07/01/19 06//26/19 06/24/19 06/19/19 6/17/19 6/12/19 6/10/19   VISIT 13 12/12 11/12 10/12 9/12 8/12 7/12 6/12 5/12 4/12 4 3 2/pending   G CODE VISIT 4/10 3/10 2/10 1/10 9/10 8/10 7/10 7/10 6/10 5/10 4/10 310 2/10   POC EXP. DATE 8/6/19 08/06/19 08/06/19 08/06/19 8/6/19 08/06/19 08/06/19 08/06/19 08/06/19 08/06/19 8/6/19 8/6/19 8/6/19   VISIT AMOUNT  MEDICARE TOTAL 60.64  992.48 90.96  931.84 60.64  840.88 90.96  780.24 60.64  689.28 60.64  628.64 90.96  568.00 60.64  477.04 90.96  416.40 60.64  325.44 60.64  264.80 60.64  204.16 60.64  143.52   FACE-TO-FACE 8/6/19 08/06/19 08/06/19 08/06/19 8/6/19 07/06/19 07/06/19 07/06/19 07/06/19 07/06/19 7/6/19 7/6/19 7/6/19   FOTO            3/5 2/5                  "  MT Not today See below See below Not today See below See below See below         TABLE:                Quad sets 30 x 5" 30 x 5" 30 x 5" 30 x 5" 30 x 5" 30 x 5" 3 x 10 x 5" 3 x 10 x 3" 3 x 10 x 3" 3 x 10 x 3" 3 x 10 x 3" 3 x 10 x 3" 2 x10 x 3"   TA 20 x 3" 20 x 3" 20 x 3" 20 x 3" 2 x 10 x 3" 20 x 3" 2 x 10 x 3" 2 x 10 x 3" 2 x 10 x 3" 3 x 10 x 3" 3 x 10 x 3" 3 x 10 x 3" 2 x 10 x 3"   bridging 3 x 10 2 x 10 2 x 10 2 x 10 2 x 10 2 x 10 2 x 10 2 x 10 2 x 10 2 x 10 2 x 10 1 x 10 2 x 5   LTR 2 x 10 2 x 10 2 x 10 2 x 10 2 x 10 2 x 10 2 x 10 2 x 10 2 x 10 2 x 10 2  X 10 2 x 10 2 x 10   TA with March 2 x 10 2 x 10 2 x 10 2 x 10 2 x 10 2 x 10 2 x 10 2 x 10 2 x 10 Not today 2 x 10 Next?    Hip abduction - seated  3 x 10 x BTB 3 x 10 BTB 3 x 10 GTB 3 x 10 GTB 3 x 10 GTB 2 x 10 GTB 3 x 10 RTB 3 x 10 RTB 3 x 10 RTB Not today 3 x 10 RTB 2 x 10 RTB    Hip adduction - seated 30 x 3" w/ball 30 x 3" w/ball 30 x 3" w/ball 30 x 3" w/ ball 30 x 3" w/ball 30 x 3" w/ ball 30 x 3" w/ ball 30 x 3" w/ ball 30 x 3" w/ ball Not today 30 x 3" w/ball 20 x 3" w/ball    SEATED:                LAQ 3 x 10 x 1.5# 3 x 10 x 1.5# 3 x 10 x 1# 3 x 10 x 0# 3 x 10 x 1# 3 x 10 3 x 10 2 x 10 2 x 10 2 x 10 2 x 10 2 x 10 2 x 10   TA  1 x 10 1 x 10 1 x 10 1 x 10 1 x 10 1 x 10 1 x 10 1 x 10 1 x 10 1 x 10 1 x 10 1 x 10 1 x 10   TA with march 2 x 10 2 x 10 2 x 10 2 x 10 2 x 10 2 x 10 2 x 10 2 x 10 2 x 10 2 x 10 2 x 10 2 x 10 2 x 10   HS curls 3 x 10 GTB 3 x 10 GTB 2 x 10 RTB 2 x 10 RTB 1 x 10 RTB 1 x 10 RTB          STANDING:                Sit to stand 2 x 5 2 x 5              Hip abduction 1 x 10 1 x 10              Hip extension 1 x 10                                               Recumbent bike seat 11 L1 x 5 L1 x 5 L2 x 5 Not today L1 x 5' L1 x 5' L1 x 5' L1 x 5' L1 x 5' L1 x 5' 15 minutes 8 minutes  8 minutes                   Initials YESSI DICKSON MA GWA 1/6 MA GWA 5/6 GWA 4/6 GWA 3/6 GWA 2/6 GWA 1/6 YESSI Wright received the following manual therapy " techniques: were applied to the: lumbar and bilateral LE's for 5 minutes, including: NOT TODAY  Manual stretching of bilateral hamstrings  Manual single knee to chest    Home Exercises Provided and Patient Education Provided     Education provided:   - keeping exercises in a pain free range.    Written Home Exercises Provided: yes.  Exercises were reviewed and Adriana was able to demonstrate them prior to the end of the session.  Adriana demonstrated good  understanding of the education provided.     See EMR under Patient Instructions for exercises provided 06/06/2019.    Assessment     Pt is able to perform sit to stand exercises on this date without exacerbation of dizziness due to slowing down, standing for 3 seconds, and increasing rest time between reps.  She will continue with progression of standing exercises as tolerated due to difficulty breathing while supine exercises.  She is noted to have improving gait, transfers, and standing posture and will continue with current plan of care.     Adriana is progressing well towards her goals.   Pt prognosis is Good.     Pt will continue to benefit from skilled outpatient physical therapy to address the deficits listed in the problem list box on initial evaluation, provide pt/family education and to maximize pt's level of independence in the home and community environment.     Pt's spiritual, cultural and educational needs considered and pt agreeable to plan of care and goals.     Anticipated barriers to physical therapy: none    Goals:   Short Term Goals: 4 weeks  1. Patient will be compliant with HEP to promote the independent management of current diagnosis. MET  2. Patient will increase lumbar forward bending to reach mid shin level for function of dressing.  MET  3. Patient will improve bilateral hamstring flexibility to (30)deg during 90/90 testing.  In progress  4. Patient will report a decrease in complaints of low back pain from 10/10 to 6/10 during ADLs.  MET    Long Term Goals:  8 weeks  1. Patient will improve core stabilization strength to Good to improve tolerance to normal house work activities for self care independence. In progress  2. Patient will increase standing balance to Good. In progress  3. Patient will report a decrease in complaints of low back pain from 10/10 to 4/10 during ADLs. In progress  4. Patient will improve FOTO limitation status from 58% to 45% placing the patient in the stage 3 impaired, limited, or restricted category indicating increased functional mobility. In progress    Plan     Attempt mini squat exercise next visit.    Krzysztof Russell, PT

## 2019-07-24 ENCOUNTER — CLINICAL SUPPORT (OUTPATIENT)
Dept: REHABILITATION | Facility: HOSPITAL | Age: 74
End: 2019-07-24
Attending: PSYCHIATRY & NEUROLOGY
Payer: MEDICARE

## 2019-07-24 DIAGNOSIS — G89.29 CHRONIC BILATERAL LOW BACK PAIN WITH BILATERAL SCIATICA: ICD-10-CM

## 2019-07-24 DIAGNOSIS — M62.81 MUSCLE WEAKNESS: ICD-10-CM

## 2019-07-24 DIAGNOSIS — M54.42 CHRONIC BILATERAL LOW BACK PAIN WITH BILATERAL SCIATICA: ICD-10-CM

## 2019-07-24 DIAGNOSIS — M54.41 CHRONIC BILATERAL LOW BACK PAIN WITH BILATERAL SCIATICA: ICD-10-CM

## 2019-07-24 DIAGNOSIS — R26.2 DIFFICULTY WALKING: ICD-10-CM

## 2019-07-24 DIAGNOSIS — R26.89 BALANCE PROBLEMS: ICD-10-CM

## 2019-07-24 PROCEDURE — 97110 THERAPEUTIC EXERCISES: CPT | Mod: PO

## 2019-07-24 NOTE — PROGRESS NOTES
Physical Therapy Daily Treatment Note     Name: Adriana Strong  Clinic Number: [id number]    Therapy Diagnosis:   Encounter Diagnoses   Name Primary?    Chronic bilateral low back pain with bilateral sciatica     Difficulty walking     Muscle weakness     Balance problems      Physician: Sean Saravia MD    Visit Date: 7/24/2019    Physician Orders: PT Eval and Treat   Medical Diagnosis from Referral: M48.062 (ICD-10-CM) - Spinal stenosis of lumbar region with neurogenic claudication  Evaluation Date: 6/6/2019  Authorization Period Expiration: 5/21/20  Plan of Care Expiration: 8/6/19  Visit # / Visits authorized: 14/ pending    Time In: 9:00 am  Time Out: 9:45 am  Total Billable Time: 40 minutes    Precautions: Standard and Fall    Subjective     Pt reports: having no back pain today.    She was compliant with home exercise program.  Response to previous treatment: muscle soreness  Functional change: no change at this time    Pain: 0/10  Location: bilateral back      Objective     Adriana received therapeutic exercises to develop strength, endurance, ROM, flexibility, posture and core stabilization for 40 minutes including:      Date  07/24/19 7/22/19 07/17/19 07/15/19 07/10/19 7/8/19 07/03 07/01/19 06//26/19 06/24/19 06/19/19 6/17/19 6/12/19 6/10/19   VISIT 14/pending 13 12/12 11/12 10/12 9/12 8/12 7/12 6/12 5/12 4/12 4 3 2/pending   G CODE VISIT 5/10 4/10 3/10 2/10 1/10 9/10 8/10 7/10 7/10 6/10 5/10 4/10 310 2/10   POC EXP. DATE 08/06/19 8/6/19 08/06/19 08/06/19 08/06/19 8/6/19 08/06/19 08/06/19 08/06/19 08/06/19 08/06/19 8/6/19 8/6/19 8/6/19   VISIT AMOUNT  MEDICARE TOTAL 90.96  1083.44 60.64  992.48 90.96  931.84 60.64  840.88 90.96  780.24 60.64  689.28 60.64  628.64 90.96  568.00 60.64  477.04 90.96  416.40 60.64  325.44 60.64  264.80 60.64  204.16 60.64  143.52   FACE-TO-FACE 08/06/19 8/6/19 08/06/19 08/06/19 08/06/19 8/6/19 07/06/19 07/06/19 07/06/19 07/06/19 07/06/19 7/6/19 7/6/19 7/6/19   FOTO        "      3/5 2/5                    MT Not today Not today See below See below Not today See below See below See below         TABLE:                 Quad sets 30 x 5' 30 x 5" 30 x 5" 30 x 5" 30 x 5" 30 x 5" 30 x 5" 3 x 10 x 5" 3 x 10 x 3" 3 x 10 x 3" 3 x 10 x 3" 3 x 10 x 3" 3 x 10 x 3" 2 x10 x 3"   TA 20 x 3" 20 x 3" 20 x 3" 20 x 3" 20 x 3" 2 x 10 x 3" 20 x 3" 2 x 10 x 3" 2 x 10 x 3" 2 x 10 x 3" 3 x 10 x 3" 3 x 10 x 3" 3 x 10 x 3" 2 x 10 x 3"   bridging 3 x 10 3 x 10 2 x 10 2 x 10 2 x 10 2 x 10 2 x 10 2 x 10 2 x 10 2 x 10 2 x 10 2 x 10 1 x 10 2 x 5   LTR 2 x 10 2 x 10 2 x 10 2 x 10 2 x 10 2 x 10 2 x 10 2 x 10 2 x 10 2 x 10 2 x 10 2  X 10 2 x 10 2 x 10   TA with March 2 x 10 2 x 10 2 x 10 2 x 10 2 x 10 2 x 10 2 x 10 2 x 10 2 x 10 2 x 10 Not today 2 x 10 Next?    Hip abduction - seated 3 x 10 BTB  3 x 10 x BTB 3 x 10 BTB 3 x 10 GTB 3 x 10 GTB 3 x 10 GTB 2 x 10 GTB 3 x 10 RTB 3 x 10 RTB 3 x 10 RTB Not today 3 x 10 RTB 2 x 10 RTB    Hip adduction - seated 30 x 3" w/ ball 30 x 3" w/ball 30 x 3" w/ball 30 x 3" w/ball 30 x 3" w/ ball 30 x 3" w/ball 30 x 3" w/ ball 30 x 3" w/ ball 30 x 3" w/ ball 30 x 3" w/ ball Not today 30 x 3" w/ball 20 x 3" w/ball    SEATED:                 LAQ 3 x 10 x 1.5# 3 x 10 x 1.5# 3 x 10 x 1.5# 3 x 10 x 1# 3 x 10 x 0# 3 x 10 x 1# 3 x 10 3 x 10 2 x 10 2 x 10 2 x 10 2 x 10 2 x 10 2 x 10   TA  1 x 10 1 x 10 1 x 10 1 x 10 1 x 10 1 x 10 1 x 10 1 x 10 1 x 10 1 x 10 1 x 10 1 x 10 1 x 10 1 x 10   TA with march 2 x 10 2 x 10 2 x 10 2 x 10 2 x 10 2 x 10 2 x 10 2 x 10 2 x 10 2 x 10 2 x 10 2 x 10 2 x 10 2 x 10   HS curls 3 x 10 GTB 3 x 10 GTB 3 x 10 GTB 2 x 10 RTB 2 x 10 RTB 1 x 10 RTB 1 x 10 RTB          STANDING:                 Sit to stand 2 x 5 2 x 5 2 x 5              Hip abduction 1 x 10 1 x 10 1 x 10              Hip extension 1 x 10 1 x 10               Mini squats 1 x 5                                 Recumbent bike seat 11 L1 x 5 L1 x 5 L1 x 5 L2 x 5 Not today L1 x 5' L1 x 5' L1 x 5' L1 x 5' L1 x " 5' L1 x 5' 15 minutes 8 minutes  8 minutes                    Initials GWA 1/6 MA MA MA GWA 1/6 MA GWA 5/6 GWA 4/6 GWA 3/6 GWA 2/6 GWA 1/6 MA MA YESSI Wright received the following manual therapy techniques: were applied to the: lumbar and bilateral LE's for 5 minutes, including: NOT TODAY  Manual stretching of bilateral hamstrings  Manual single knee to chest    Home Exercises Provided and Patient Education Provided     Education provided:   - keeping exercises in a pain free range.  - rest when feeling fatigued.     Written Home Exercises Provided: yes.  Exercises were reviewed and Adriana was able to demonstrate them prior to the end of the session.  Adriana demonstrated good  understanding of the education provided.     See EMR under Patient Instructions for exercises provided 07/24/2019.    Assessment     Pt is able to perform sit to stand exercises on this date without exacerbation of dizziness however she did become fatigued after 3 reps of the second set of 5.  Patient was able to perform all but one of her supine exercises before she needed to sit up.  Patient completed above exercises along with addition of mini squats with no increase in symptoms prior to leaving the clinic.      Adriana is progressing well towards her goals.   Pt prognosis is Good.     Pt will continue to benefit from skilled outpatient physical therapy to address the deficits listed in the problem list box on initial evaluation, provide pt/family education and to maximize pt's level of independence in the home and community environment.     Pt's spiritual, cultural and educational needs considered and pt agreeable to plan of care and goals.     Anticipated barriers to physical therapy: none    Goals:   Short Term Goals: 4 weeks  1. Patient will be compliant with HEP to promote the independent management of current diagnosis. MET  2. Patient will increase lumbar forward bending to reach mid shin level for function of dressing.   MET  3. Patient will improve bilateral hamstring flexibility to (30)deg during 90/90 testing.  In progress  4. Patient will report a decrease in complaints of low back pain from 10/10 to 6/10 during ADLs. MET    Long Term Goals:  8 weeks  1. Patient will improve core stabilization strength to Good to improve tolerance to normal house work activities for self care independence. In progress  2. Patient will increase standing balance to Good. In progress  3. Patient will report a decrease in complaints of low back pain from 10/10 to 4/10 during ADLs. In progress  4. Patient will improve FOTO limitation status from 58% to 45% placing the patient in the stage 3 impaired, limited, or restricted category indicating increased functional mobility. In progress    Plan     Increase weights to 2# on LAQ and increase therband from green to blue on hamstring curls next visit.     Rufino Newell, PTA

## 2019-07-24 NOTE — PATIENT INSTRUCTIONS
Functional Quadriceps: Chair Squat        Keeping feet flat on floor, shoulder width apart, squat as low as is comfortable. Use support as necessary.  Repeat 10 times per set. Do 1 sets per session. Do 2 sessions per day.     [email or web address]://[email or web address].us/736     Copyright © [employer]. All rights reserved.

## 2019-07-29 ENCOUNTER — CLINICAL SUPPORT (OUTPATIENT)
Dept: REHABILITATION | Facility: HOSPITAL | Age: 74
End: 2019-07-29
Attending: PSYCHIATRY & NEUROLOGY
Payer: MEDICARE

## 2019-07-29 DIAGNOSIS — G89.29 CHRONIC BILATERAL LOW BACK PAIN WITH BILATERAL SCIATICA: ICD-10-CM

## 2019-07-29 DIAGNOSIS — R26.2 DIFFICULTY WALKING: ICD-10-CM

## 2019-07-29 DIAGNOSIS — R26.89 BALANCE PROBLEMS: ICD-10-CM

## 2019-07-29 DIAGNOSIS — M54.41 CHRONIC BILATERAL LOW BACK PAIN WITH BILATERAL SCIATICA: ICD-10-CM

## 2019-07-29 DIAGNOSIS — M54.42 CHRONIC BILATERAL LOW BACK PAIN WITH BILATERAL SCIATICA: ICD-10-CM

## 2019-07-29 DIAGNOSIS — M62.81 MUSCLE WEAKNESS: ICD-10-CM

## 2019-07-29 PROCEDURE — 97110 THERAPEUTIC EXERCISES: CPT | Mod: PO

## 2019-07-29 NOTE — PROGRESS NOTES
Physical Therapy Daily Treatment Note     Name: Adriana Strong  Clinic Number: [id number]    Therapy Diagnosis:   Encounter Diagnoses   Name Primary?    Chronic bilateral low back pain with bilateral sciatica     Difficulty walking     Muscle weakness     Balance problems      Physician: Sean Saravia MD    Visit Date: 7/29/2019    Physician Orders: PT Eval and Treat   Medical Diagnosis from Referral: M48.062 (ICD-10-CM) - Spinal stenosis of lumbar region with neurogenic claudication  Evaluation Date: 6/6/2019  Authorization Period Expiration: 5/21/20  Plan of Care Expiration: 8/6/19  Visit # / Visits authorized: 15/ pending    Time In: 9:00 am  Time Out: 9:50 am  Total Billable Time: 25 minutes    Precautions: Standard and Fall    Subjective     Pt reports: having no back pain today.    She was compliant with home exercise program.  Response to previous treatment: muscle soreness  Functional change: no change at this time    Pain: 0/10  Location: bilateral back      Objective     Adriana received therapeutic exercises to develop strength, endurance, ROM, flexibility, posture and core stabilization for 25 minutes including:      Date  07/29/19 07/24/19 7/22/19 07/17/19 07/15/19 07/10/19 7/8/19 07/03 07/01/19 06//26/19 06/24/19 06/19/19 6/17/19 6/12/19 6/10/19   VISIT 15/ pending  14/pending 13 12/12 11/12 10/12 9/12 8/12 7/12 6/12 5/12 4/12 4 3 2/pending   G CODE VISIT 6/10 5/10 4/10 3/10 2/10 1/10 9/10 8/10 7/10 7/10 6/10 5/10 4/10 310 2/10   POC EXP. DATE 08/06/19 08/06/19 8/6/19 08/06/19 08/06/19 08/06/19 8/6/19 08/06/19 08/06/19 08/06/19 08/06/19 08/06/19 8/6/19 8/6/19 8/6/19   VISIT AMOUNT  MEDICARE TOTAL 60.64  1144.08 90.96  1083.44 60.64  992.48 90.96  931.84 60.64  840.88 90.96  780.24 60.64  689.28 60.64  628.64 90.96  568.00 60.64  477.04 90.96  416.40 60.64  325.44 60.64  264.80 60.64  204.16 60.64  143.52   FACE-TO-FACE 08/06/19 08/06/19 8/6/19 08/06/19 08/06/19 08/06/19 8/6/19 07/06/19 07/06/19  "07/06/19 07/06/19 07/06/19 7/6/19 7/6/19 7/6/19   FOTO              3/5 2/5                     MT Not today Not today Not today See below See below Not today See below See below See below         TABLE:                  Quad sets 30 x 5" 30 x 5' 30 x 5" 30 x 5" 30 x 5" 30 x 5" 30 x 5" 30 x 5" 3 x 10 x 5" 3 x 10 x 3" 3 x 10 x 3" 3 x 10 x 3" 3 x 10 x 3" 3 x 10 x 3" 2 x10 x 3"   TA 20 x 3" 20 x 3" 20 x 3" 20 x 3" 20 x 3" 20 x 3" 2 x 10 x 3" 20 x 3" 2 x 10 x 3" 2 x 10 x 3" 2 x 10 x 3" 3 x 10 x 3" 3 x 10 x 3" 3 x 10 x 3" 2 x 10 x 3"   bridging 3 x 10 3 x 10 3 x 10 2 x 10 2 x 10 2 x 10 2 x 10 2 x 10 2 x 10 2 x 10 2 x 10 2 x 10 2 x 10 1 x 10 2 x 5   LTR 2 x 10 2 x 10 2 x 10 2 x 10 2 x 10 2 x 10 2 x 10 2 x 10 2 x 10 2 x 10 2 x 10 2 x 10 2  X 10 2 x 10 2 x 10   TA with March 2 x 10 2 x 10 2 x 10 2 x 10 2 x 10 2 x 10 2 x 10 2 x 10 2 x 10 2 x 10 2 x 10 Not today 2 x 10 Next?    Hip abduction - seated 3 x 10 BTB 3 x 10 BTB  3 x 10 x BTB 3 x 10 BTB 3 x 10 GTB 3 x 10 GTB 3 x 10 GTB 2 x 10 GTB 3 x 10 RTB 3 x 10 RTB 3 x 10 RTB Not today 3 x 10 RTB 2 x 10 RTB    Hip adduction - seated 30 x 3" w/ ball 30 x 3" w/ ball 30 x 3" w/ball 30 x 3" w/ball 30 x 3" w/ball 30 x 3" w/ ball 30 x 3" w/ball 30 x 3" w/ ball 30 x 3" w/ ball 30 x 3" w/ ball 30 x 3" w/ ball Not today 30 x 3" w/ball 20 x 3" w/ball    SEATED:                  LAQ 2 x 10 x 2# 3 x 10 x 1.5# 3 x 10 x 1.5# 3 x 10 x 1.5# 3 x 10 x 1# 3 x 10 x 0# 3 x 10 x 1# 3 x 10 3 x 10 2 x 10 2 x 10 2 x 10 2 x 10 2 x 10 2 x 10   TA  1 x 10 1 x 10 1 x 10 1 x 10 1 x 10 1 x 10 1 x 10 1 x 10 1 x 10 1 x 10 1 x 10 1 x 10 1 x 10 1 x 10 1 x 10   TA with march 2 x 10 2 x 10 2 x 10 2 x 10 2 x 10 2 x 10 2 x 10 2 x 10 2 x 10 2 x 10 2 x 10 2 x 10 2 x 10 2 x 10 2 x 10   HS curls 2 x 10 BTB 3 x 10 GTB 3 x 10 GTB 3 x 10 GTB 2 x 10 RTB 2 x 10 RTB 1 x 10 RTB 1 x 10 RTB          STANDING:                  Sit to stand 2 x 5 2 x 5 2 x 5 2 x 5              Hip abduction Not today 1 x 10 1 x 10 1 x 10            "   Hip extension Not today 1 x 10 1 x 10               Mini squats Not today 1 x 5                                  Recumbent bike seat 11 L1 x 5' L1 x 5 L1 x 5 L1 x 5 L2 x 5 Not today L1 x 5' L1 x 5' L1 x 5' L1 x 5' L1 x 5' L1 x 5' 15 minutes 8 minutes  8 minutes                     Initials GWA 2/6 GWA 1/6 MA MA MA GWA 1/6 MA GWA 5/6 GWA 4/6 GWA 3/6 GWA 2/6 GWA 1/6 MA MA MA       Adriana received the following manual therapy techniques: were applied to the: lumbar and bilateral LE's for 5 minutes, including: NOT TODAY  Manual stretching of bilateral hamstrings  Manual single knee to chest    Home Exercises Provided and Patient Education Provided     Education provided:   - keeping exercises in a pain free range.  - rest when feeling fatigued.     Written Home Exercises Provided: yes.  Exercises were reviewed and Adriana was able to demonstrate them prior to the end of the session.  Adriana demonstrated good  understanding of the education provided.     See EMR under Patient Instructions for exercises provided 07/24/2019.    Assessment     Patient became a little dizzy after performing her bridges and required a short rest break.   Patient did not complete her standing exercises due to feeling a little dizzy today.    Adriana is progressing well towards her goals.   Pt prognosis is Good.     Pt will continue to benefit from skilled outpatient physical therapy to address the deficits listed in the problem list box on initial evaluation, provide pt/family education and to maximize pt's level of independence in the home and community environment.     Pt's spiritual, cultural and educational needs considered and pt agreeable to plan of care and goals.     Anticipated barriers to physical therapy: none    Goals:   Short Term Goals: 4 weeks  1. Patient will be compliant with HEP to promote the independent management of current diagnosis. MET  2. Patient will increase lumbar forward bending to reach mid shin level for  function of dressing.  MET  3. Patient will improve bilateral hamstring flexibility to (30)deg during 90/90 testing.  In progress  4. Patient will report a decrease in complaints of low back pain from 10/10 to 6/10 during ADLs. MET    Long Term Goals:  8 weeks  1. Patient will improve core stabilization strength to Good to improve tolerance to normal house work activities for self care independence. In progress  2. Patient will increase standing balance to Good. In progress  3. Patient will report a decrease in complaints of low back pain from 10/10 to 4/10 during ADLs. In progress  4. Patient will improve FOTO limitation status from 58% to 45% placing the patient in the stage 3 impaired, limited, or restricted category indicating increased functional mobility. In progress    Plan     Resume all exercises next visit.     Rufino Newell, PTA

## 2019-07-30 ENCOUNTER — OFFICE VISIT (OUTPATIENT)
Dept: CARDIOLOGY | Facility: CLINIC | Age: 74
End: 2019-07-30
Payer: MEDICARE

## 2019-07-30 VITALS
HEART RATE: 78 BPM | HEIGHT: 64 IN | OXYGEN SATURATION: 97 % | WEIGHT: 233.69 LBS | SYSTOLIC BLOOD PRESSURE: 137 MMHG | BODY MASS INDEX: 39.9 KG/M2 | DIASTOLIC BLOOD PRESSURE: 84 MMHG

## 2019-07-30 DIAGNOSIS — E11.42 CONTROLLED TYPE 2 DIABETES MELLITUS WITH DIABETIC POLYNEUROPATHY, WITHOUT LONG-TERM CURRENT USE OF INSULIN: ICD-10-CM

## 2019-07-30 DIAGNOSIS — I10 ESSENTIAL HYPERTENSION: ICD-10-CM

## 2019-07-30 DIAGNOSIS — J45.20 MILD INTERMITTENT ASTHMA WITHOUT COMPLICATION: ICD-10-CM

## 2019-07-30 DIAGNOSIS — Z86.73 OLD LACUNAR STROKE WITHOUT LATE EFFECT: ICD-10-CM

## 2019-07-30 DIAGNOSIS — Z79.01 WARFARIN ANTICOAGULATION: ICD-10-CM

## 2019-07-30 DIAGNOSIS — M48.062 SPINAL STENOSIS OF LUMBAR REGION WITH NEUROGENIC CLAUDICATION: ICD-10-CM

## 2019-07-30 DIAGNOSIS — I48.19 PERSISTENT ATRIAL FIBRILLATION: ICD-10-CM

## 2019-07-30 DIAGNOSIS — I35.0 MODERATE AORTIC STENOSIS: ICD-10-CM

## 2019-07-30 DIAGNOSIS — I48.20 CHRONIC ATRIAL FIBRILLATION: Primary | ICD-10-CM

## 2019-07-30 DIAGNOSIS — E78.00 PURE HYPERCHOLESTEROLEMIA: ICD-10-CM

## 2019-07-30 DIAGNOSIS — J41.0 SIMPLE CHRONIC BRONCHITIS: ICD-10-CM

## 2019-07-30 PROCEDURE — 3075F SYST BP GE 130 - 139MM HG: CPT | Mod: CPTII,S$GLB,, | Performed by: INTERNAL MEDICINE

## 2019-07-30 PROCEDURE — 99999 PR PBB SHADOW E&M-EST. PATIENT-LVL III: ICD-10-PCS | Mod: PBBFAC,,, | Performed by: INTERNAL MEDICINE

## 2019-07-30 PROCEDURE — 1101F PR PT FALLS ASSESS DOC 0-1 FALLS W/OUT INJ PAST YR: ICD-10-PCS | Mod: CPTII,S$GLB,, | Performed by: INTERNAL MEDICINE

## 2019-07-30 PROCEDURE — 3079F PR MOST RECENT DIASTOLIC BLOOD PRESSURE 80-89 MM HG: ICD-10-PCS | Mod: CPTII,S$GLB,, | Performed by: INTERNAL MEDICINE

## 2019-07-30 PROCEDURE — 99214 OFFICE O/P EST MOD 30 MIN: CPT | Mod: S$GLB,,, | Performed by: INTERNAL MEDICINE

## 2019-07-30 PROCEDURE — 99999 PR PBB SHADOW E&M-EST. PATIENT-LVL III: CPT | Mod: PBBFAC,,, | Performed by: INTERNAL MEDICINE

## 2019-07-30 PROCEDURE — 1101F PT FALLS ASSESS-DOCD LE1/YR: CPT | Mod: CPTII,S$GLB,, | Performed by: INTERNAL MEDICINE

## 2019-07-30 PROCEDURE — 3075F PR MOST RECENT SYSTOLIC BLOOD PRESS GE 130-139MM HG: ICD-10-PCS | Mod: CPTII,S$GLB,, | Performed by: INTERNAL MEDICINE

## 2019-07-30 PROCEDURE — 99214 PR OFFICE/OUTPT VISIT, EST, LEVL IV, 30-39 MIN: ICD-10-PCS | Mod: S$GLB,,, | Performed by: INTERNAL MEDICINE

## 2019-07-30 PROCEDURE — 3079F DIAST BP 80-89 MM HG: CPT | Mod: CPTII,S$GLB,, | Performed by: INTERNAL MEDICINE

## 2019-07-30 RX ORDER — METFORMIN HYDROCHLORIDE 500 MG/1
500 TABLET ORAL 2 TIMES DAILY
Refills: 1 | Status: ON HOLD | COMMUNITY
Start: 2019-07-16 | End: 2022-08-12 | Stop reason: SDUPTHER

## 2019-07-30 NOTE — PROGRESS NOTES
Subjective:      Patient ID: Adriana Strong is a 74 y.o. female.    Chief Complaint: Follow-up ( ER visit yesterday for syncope)    HPI:  Pt went to [facility] ER yesterday evening because pt felt weak like she was going to pass out while sitting on the commode trying to give a urine specimen while at Dr Lares's office (gynecologist).  Pt felt like she could not breathe.  Pt felt dizzy and spaced out.  Two days ago pt arose from bed and sat on the commode to urinate and pt felt a spinning sensation and felt like she was going to faint.    Review of Systems   Cardiovascular: Positive for chest pain (Occasional sharp retrosternal pains which occur when pt is relaxed which last 3 months.) and dyspnea on exertion. Negative for claudication, irregular heartbeat, leg swelling, near-syncope, orthopnea, palpitations and syncope.      Chest feels tight upon arising in the morning.    Pt feels short of breath after walking up a half of a flight of stairs.    One day last week BP was 160/93.    Pt sees Dr Go for DM.    Pt is going to PT for pinched nerves in the back.    Pt takes ditropan for bladder incontinence.    Past Medical History:   Diagnosis Date    Anticoagulant long-term use     on Plavix since May 2015    Anxiety     Arthritis     Asthma     Atrial fibrillation     Cataracts, bilateral     Chest pain, atypical     Colon polyp     Coronary artery disease     Diabetes mellitus     Dry eyes     Esophageal erosions     GERD (gastroesophageal reflux disease)     Heart murmur     Hemorrhoid     High cholesterol     Hypertension     Irritable bowel syndrome     Shingles 2015    TIA (transient ischemic attack)     Use of cane as ambulatory aid         Past Surgical History:   Procedure Laterality Date    ABDOMINAL SURGERY      angiocele      2007, 2014    ANGIOGRAM-ABDOMINAL N/A 5/5/2015    Performed by Joana Hernandez MD at [facility] OR [address] [place] [address]    ANGIOGRAM-ABDOMINAL SMA stent Left Brachial  access. Right arm over head. Left 4/29/2015    Performed by [facility] MD at [facility] OR [facility]    ANGIOGRAM-ABDOMINAL-INTESTINAL - Mesenteric angiogram Right CFA access, possible left arm access  Right 1/5/2016    Performed by [facility] Hernandez MD at [facility] OR [address] [place] [address]    ANGIOGRAM-MESENTERIC  1/5/2016    Performed by [facility]ana Hernandez MD at [facility] OR [address] [place] [address]    BREAST SURGERY      left--- a lump--- no cancer    COLONOSCOPY  2014    COLONOSCOPY N/A 3/14/2018    Performed by Efren Kemp MD at [facility] ENDO (4TH FLR)    ESOPHAGOGASTRODUODENOSCOPY (EGD) N/A 2/22/2016    Performed by Efren Kemp MD at [facility] END[facility] (4TH FLR)    EXPLORATION-BLEEDING (RE-EXPLORATION) Left 5/7/2015    Performed by Joana Hernandez MD at [facility] OR [address] [place] [address]    HERNIA REPAIR      HYSTERECTOMY  partial    1982 partial hysterectomy    left nasal polyp      left neck lymph node      nose polyp      PLACEMENT-STENT Right 1/5/2016    Performed by [facility] Hernandez MD at [facility] OR [address] [place] [address]    REPAIR-HERNIA-LAPAROSCOPIC-INCISIONAL w/ mesh N/A 6/12/2017    Performed by Jay Milligan MD at [facility] OR 2ND [facility]    right hip fatty mass tissue      stent to small intestine      SUPERIOR VENA CAVA ANGIOPLASTY / STENTING      THROMBECTOMY - left brachial artery Left 5/5/2015    [facility]    TONSILLECTOMY      TOTAL ABDOMINAL HYSTERECTOMY      2014    TOTAL KNEE ARTHROPLASTY Bilateral     UPPER GASTROINTESTINAL ENDOSCOPY  2014       Family History   Problem Relation Age of Onset    Colon cancer Neg Hx     Inflammatory bowel disease Neg Hx        Social History     Socioeconomic History    Marital status:      Spouse name: Not on file    Number of children: Not on file    Years of education: Not on file    Highest education level: Not on file   Occupational History    Not on file   Social Needs    Financial resource strain: Not on file    Food insecurity:     Worry: Not on file     Inability:  Not on file    Transportation needs:     Medical: Not on file     Non-medical: Not on file   Tobacco Use    Smoking status: Never Smoker    Smokeless tobacco: Never Used   Substance and Sexual Activity    Alcohol use: No    Drug use: No    Sexual activity: Not on file   Lifestyle    Physical activity:     Days per week: Not on file     Minutes per session: Not on file    Stress: Not on file   Relationships    Social connections:     Talks on phone: Not on file     Gets together: Not on file     Attends Zoroastrian service: Not on file     Active member of club or organization: Not on file     Attends meetings of clubs or organizations: Not on file     Relationship status: Not on file   Other Topics Concern    Not on file   Social History Narrative    Not on file       Current Outpatient Medications on File Prior to Visit   Medication Sig Dispense Refill    acetaminophen (TYLENOL ARTHRITIS) 650 MG TbSR Take 1,300 mg by mouth 2 (two) times daily.      albuterol (ACCUNEB) 1.25 mg/3 mL Nebu Take scheduled q4 for the next two days while at home then resume prn dosing 3 mL 1    albuterol (VENTOLIN HFA) 90 mcg/actuation inhaler Inhale 2 puffs into the lungs every 6 (six) hours as needed for Wheezing. Rescue      ascorbic acid (VITAMIN C) 500 MG tablet Take 500 mg by mouth once daily.      azelastine (ASTELIN) 137 mcg nasal spray 1 spray by Nasal route 2 (two) times daily.      budesonide-formoterol 160-4.5 mcg (SYMBICORT) 160-4.5 mcg/actuation HFAA Inhale 2 puffs into the lungs every 12 (twelve) hours. Controller      calcium carbonate (OS-MICHAEL) 600 mg (1,500 mg) Tab Take 1,200 mg by mouth once daily.      clopidogrel (PLAVIX) 75 mg tablet Take 1 tablet (75 mg total) by mouth once daily. 90 tablet 4    cyanocobalamin (VITAMIN B-12) 1000 MCG tablet Take 100 mcg by mouth once daily.      digoxin (LANOXIN) 125 mcg tablet Take 1 tablet (125 mcg total) by mouth once daily. 90 tablet 3    diltiaZEM (CARDIZEM  CD) 180 MG 24 hr capsule Take 1 capsule (180 mg total) by mouth once daily. 90 capsule 3    docusate sodium (COLACE) 100 MG capsule Take 1 capsule (100 mg total) by mouth 2 (two) times daily. 60 capsule 1    DULoxetine (CYMBALTA) 20 MG capsule Take 1 capsule (20 mg total) by mouth 2 (two) times daily. 180 capsule 3    fexofenadine (ALLEGRA) 60 MG tablet Take 60 mg by mouth every morning.      fish oil-omega-3 fatty acids 300-1,000 mg capsule Take 1 g by mouth once daily.       fluticasone (FLONASE) 50 mcg/actuation nasal spray 1 spray by Each Nare route every morning.       furosemide (LASIX) 40 MG tablet Take 1 tablet (40 mg total) by mouth once daily. 90 tablet 3    gabapentin (NEURONTIN) 300 MG capsule Take 4 capsules (1,200 mg total) by mouth 3 (three) times daily. 360 capsule 3    lisinopril (PRINIVIL,ZESTRIL) 20 MG tablet Take 1 tablet (20 mg total) by mouth once daily. 90 tablet 3    lorazepam (ATIVAN) 0.5 MG tablet Take 0.5 mg by mouth every morning.       magnesium 250 mg Tab Take 250 mg by mouth. 3 in am and 3 pm      metFORMIN (GLUCOPHAGE) 500 MG tablet Take 500 mg by mouth 2 (two) times daily.  1    ondansetron (ZOFRAN-ODT) 4 MG TbDL DISSOLVE 1 TABLET ON TONGUE EVERY 8 HOURS AS NEEDED FOR NAUSEA  1    oxybutynin (DITROPAN-XL) 5 MG TR24 Take 5 mg by mouth every evening.       pantoprazole (PROTONIX) 40 MG tablet Take 1 tablet (40 mg total) by mouth once daily. (Patient taking differently: Take 40 mg by mouth every morning. ) 90 tablet 3    polyethylene glycol (GLYCOLAX) 17 gram PwPk Take 17 g by mouth once daily.      POLYSORBATE 80/GLYCERIN (REFRESH DRY EYE THERAPY OPHT) Apply to eye 2 (two) times daily.      potassium gluconate 550 mg (90 mg) Tab Take by mouth. 2 in am and 2 pm      zafirlukast (ACCOLATE) 20 MG tablet Take 20 mg by mouth once daily.  3    [DISCONTINUED] apixaban 5 mg Tab Take 1 tablet (5 mg total) by mouth 2 (two) times daily. 180 tablet 3    traMADol (ULTRAM) 50 mg  "tablet Take 1 tablet (50 mg total) by mouth every 6 (six) hours as needed for Pain. 20 tablet 0    [DISCONTINUED] psyllium (METAMUCIL) powder Take 1 packet by mouth 3 (three) times daily.       No current facility-administered medications on file prior to visit.        Review of patient's allergies indicates:   Allergen Reactions    Celebrex [celecoxib] Shortness Of Breath    Ciprofloxacin Swelling     lip    Dicyclomine Hives    Adhesive Dermatitis    Avelox [moxifloxacin] Swelling    Cilostazol Other (See Comments)     Elevates blood pressure    Eryc [erythromycin] Other (See Comments)     unknown    Iodine and iodide containing products Hives    Keflex [cephalexin] Hives    Meclomen      rashes    Meloxicam      Ear ringing    Metoclopramide Other (See Comments)     High blood pressure    Sulfa (sulfonamide antibiotics) Itching     Objective:     Vitals:    07/30/19 1000   BP: 137/84   BP Location: Left arm   Patient Position: Sitting   BP Method: Large (Automatic)   Pulse: 78   SpO2: 97%   Weight: 106 kg (233 lb 11 oz)   Height: 5' 4" (1.626 m)        Physical Exam   Constitutional: She is oriented to person, place, and time. She appears well-developed and well-nourished.   Eyes: No scleral icterus.   Neck: No JVD present. Carotid bruit is not present.   Cardiovascular: Normal rate. An irregularly irregular rhythm present. Exam reveals no gallop.   Murmur (III/VI systolic) heard.  Pulmonary/Chest: She has rales (few crackles left base).   Musculoskeletal: She exhibits edema (trace pitting pedal edema bilaterally).   Neurological: She is alert and oriented to person, place, and time.   Skin: Skin is warm and dry.   Psychiatric: She has a normal mood and affect. Her behavior is normal. Judgment and thought content normal.   Vitals reviewed.     Records from [facility] yesterday reviewed:  ECG showed atrial fibrillationwtih a controlled ventricular response, leftward axis, nonspecific ST sagging consistent " with digoxin effect.  Hgb 13.6  CMP showed glucose 150, creat 0.7, rest of CMP OK, trop WNL  CT brain showed mild chronic ischemic disease  CXR showed mild cardiomegaly and clear lungs    Labs from Dr Go:    HgbA1C 6.9    NOte echocardiogram in January showed severe aortic stenosis with maximum instantaneous velocity of 3.43 m/s. Normal LVEF.    Assessment:     1. Chronic atrial fibrillation    2. Persistent atrial fibrillation    3. Essential hypertension    4. Pure hypercholesterolemia    5. Moderate aortic stenosis    6. Mild intermittent asthma without complication    7. Simple chronic bronchitis    8. Spinal stenosis of lumbar region with neurogenic claudication    9. Warfarin anticoagulation    10. BMI 38.0-38.9,adult    11. Controlled type 2 diabetes mellitus with diabetic polyneuropathy, without long-term current use of insulin    12. Old lacunar stroke without late effect      Plan:   Adriana was seen today for follow-up.    Diagnoses and all orders for this visit:    Chronic atrial fibrillation    Persistent atrial fibrillation  -     apixaban (ELIQUIS) 5 mg Tab; Take 1 tablet (5 mg total) by mouth 2 (two) times daily.    Essential hypertension    Pure hypercholesterolemia    Moderate aortic stenosis    Mild intermittent asthma without complication    Simple chronic bronchitis    Spinal stenosis of lumbar region with neurogenic claudication    Warfarin anticoagulation    BMI 38.0-38.9,adult    Controlled type 2 diabetes mellitus with diabetic polyneuropathy, without long-term current use of insulin    Old lacunar stroke without late effect    The edema is a side effect of diltiazem and gabapentin and ditropan    The dizziness is potentially due to ditropan and gabapentin and severe aortic stenosis and orthostatic hypotension due to diuretic.    Will repeat echocardiogram to see if aortic stenosis has become more severe    Will repeat the holter monitor    Consider empirically reducing the dose  of gabapentin and ditropan    Discussed potential need to be evaluated for TAVR if weak spells recur    RTC one month      Follow up in about 4 weeks (around 8/27/2019).

## 2019-07-31 ENCOUNTER — CLINICAL SUPPORT (OUTPATIENT)
Dept: REHABILITATION | Facility: HOSPITAL | Age: 74
End: 2019-07-31
Attending: PSYCHIATRY & NEUROLOGY
Payer: MEDICARE

## 2019-07-31 DIAGNOSIS — R26.2 DIFFICULTY WALKING: ICD-10-CM

## 2019-07-31 DIAGNOSIS — R26.89 BALANCE PROBLEMS: ICD-10-CM

## 2019-07-31 DIAGNOSIS — M54.41 CHRONIC BILATERAL LOW BACK PAIN WITH BILATERAL SCIATICA: ICD-10-CM

## 2019-07-31 DIAGNOSIS — G89.29 CHRONIC BILATERAL LOW BACK PAIN WITH BILATERAL SCIATICA: ICD-10-CM

## 2019-07-31 DIAGNOSIS — M54.42 CHRONIC BILATERAL LOW BACK PAIN WITH BILATERAL SCIATICA: ICD-10-CM

## 2019-07-31 DIAGNOSIS — M62.81 MUSCLE WEAKNESS: ICD-10-CM

## 2019-07-31 PROCEDURE — 97110 THERAPEUTIC EXERCISES: CPT | Mod: PO

## 2019-07-31 NOTE — PROGRESS NOTES
Physical Therapy Daily Treatment Note     Name: Adriana Strong  Clinic Number: [id number]    Therapy Diagnosis:   Encounter Diagnoses   Name Primary?    Chronic bilateral low back pain with bilateral sciatica     Difficulty walking     Muscle weakness     Balance problems      Physician: Sean Saravia MD    Visit Date: 7/31/2019    Physician Orders: PT Eval and Treat   Medical Diagnosis from Referral: M48.062 (ICD-10-CM) - Spinal stenosis of lumbar region with neurogenic claudication  Evaluation Date: 6/6/2019  Authorization Period Expiration: 5/21/20  Plan of Care Expiration: 8/6/19  Visit # / Visits authorized: 16/ pending    Time In: 9:00 am  Time Out: 9:45 am  Total Billable Time: 25 minutes    Precautions: Standard and Fall    Subjective     Pt reports: having slight pain across low back during standing hip abduction exercise.    She was compliant with home exercise program.  Response to previous treatment: muscle soreness  Functional change: no change at this time    Pain: 0/10  Location: bilateral back      Objective     Adriana received therapeutic exercises to develop strength, endurance, ROM, flexibility, posture and core stabilization for 25 minutes including:      Date  7/31/19 07/29/19 07/24/19 7/22/19 07/17/19 07/15/19 07/10/19 7/8/19 07/03 07/01/19 06//26/19 06/24/19 06/19/19 6/17/19 6/12/19 6/10/19   VISIT 16/pending 15/ pending  14/pending 13 12/12 11/12 10/12 9/12 8/12 7/12 6/12 5/12 4/12 4 3 2/pending   G CODE VISIT 7/10 6/10 5/10 4/10 3/10 2/10 1/10 9/10 8/10 7/10 7/10 6/10 5/10 4/10 310 2/10   POC EXP. DATE 8/6/19 08/06/19 08/06/19 8/6/19 08/06/19 08/06/19 08/06/19 8/6/19 08/06/19 08/06/19 08/06/19 08/06/19 08/06/19 8/6/19 8/6/19 8/6/19   VISIT AMOUNT  MEDICARE TOTAL 60.64  1204.72 60.64  1144.08 90.96  1083.44 60.64  992.48 90.96  931.84 60.64  840.88 90.96  780.24 60.64  689.28 60.64  628.64 90.96  568.00 60.64  477.04 90.96  416.40 60.64  325.44 60.64  264.80 60.64  204.16  "60.64  143.52   FACE-TO-FACE 8/6/19 08/06/19 08/06/19 8/6/19 08/06/19 08/06/19 08/06/19 8/6/19 07/06/19 07/06/19 07/06/19 07/06/19 07/06/19 7/6/19 7/6/19 7/6/19   FOTO               3/5 2/5                      MT -- Not today Not today Not today See below See below Not today See below See below See below         TABLE:                   Quad sets -- 30 x 5" 30 x 5' 30 x 5" 30 x 5" 30 x 5" 30 x 5" 30 x 5" 30 x 5" 3 x 10 x 5" 3 x 10 x 3" 3 x 10 x 3" 3 x 10 x 3" 3 x 10 x 3" 3 x 10 x 3" 2 x10 x 3"   TA -- 20 x 3" 20 x 3" 20 x 3" 20 x 3" 20 x 3" 20 x 3" 2 x 10 x 3" 20 x 3" 2 x 10 x 3" 2 x 10 x 3" 2 x 10 x 3" 3 x 10 x 3" 3 x 10 x 3" 3 x 10 x 3" 2 x 10 x 3"   bridging -- 3 x 10 3 x 10 3 x 10 2 x 10 2 x 10 2 x 10 2 x 10 2 x 10 2 x 10 2 x 10 2 x 10 2 x 10 2 x 10 1 x 10 2 x 5   LTR -- 2 x 10 2 x 10 2 x 10 2 x 10 2 x 10 2 x 10 2 x 10 2 x 10 2 x 10 2 x 10 2 x 10 2 x 10 2  X 10 2 x 10 2 x 10   TA with March -- 2 x 10 2 x 10 2 x 10 2 x 10 2 x 10 2 x 10 2 x 10 2 x 10 2 x 10 2 x 10 2 x 10 Not today 2 x 10 Next?    Hip abduction - seated 3 x 10 BTB 3 x 10 BTB 3 x 10 BTB  3 x 10 x BTB 3 x 10 BTB 3 x 10 GTB 3 x 10 GTB 3 x 10 GTB 2 x 10 GTB 3 x 10 RTB 3 x 10 RTB 3 x 10 RTB Not today 3 x 10 RTB 2 x 10 RTB    Hip adduction - seated 30 x 3" w/ball 30 x 3" w/ ball 30 x 3" w/ ball 30 x 3" w/ball 30 x 3" w/ball 30 x 3" w/ball 30 x 3" w/ ball 30 x 3" w/ball 30 x 3" w/ ball 30 x 3" w/ ball 30 x 3" w/ ball 30 x 3" w/ ball Not today 30 x 3" w/ball 20 x 3" w/ball    SEATED:                   LAQ 2 x 10 x 2# 2 x 10 x 2# 3 x 10 x 1.5# 3 x 10 x 1.5# 3 x 10 x 1.5# 3 x 10 x 1# 3 x 10 x 0# 3 x 10 x 1# 3 x 10 3 x 10 2 x 10 2 x 10 2 x 10 2 x 10 2 x 10 2 x 10   TA  2 x 10 x 3" 1 x 10 1 x 10 1 x 10 1 x 10 1 x 10 1 x 10 1 x 10 1 x 10 1 x 10 1 x 10 1 x 10 1 x 10 1 x 10 1 x 10 1 x 10   TA with march 2 x10 2 x 10 2 x 10 2 x 10 2 x 10 2 x 10 2 x 10 2 x 10 2 x 10 2 x 10 2 x 10 2 x 10 2 x 10 2 x 10 2 x 10 2 x 10   Seated trunk twist 1 x 10                  HS " curls 2 x 10 BTB 2 x 10 BTB 3 x 10 GTB 3 x 10 GTB 3 x 10 GTB 2 x 10 RTB 2 x 10 RTB 1 x 10 RTB 1 x 10 RTB          STANDING:                   Sit to stand 2 x 5 2 x 5 2 x 5 2 x 5 2 x 5              Hip abduction 1 x 10 Not today 1 x 10 1 x 10 1 x 10              Hip extension 1 x 10 Not today 1 x 10 1 x 10               Mini squats 2 x 5 Not today 1 x 5                                   Recumbent bike seat 11 L1 x 5' L1 x 5' L1 x 5 L1 x 5 L1 x 5 L2 x 5 Not today L1 x 5' L1 x 5' L1 x 5' L1 x 5' L1 x 5' L1 x 5' 15 minutes 8 minutes  8 minutes                      Initials MA GWA 2/6 GWA 1/6 MA MA MA GWA 1/6 MA GWA 5/6 GWA 4/6 GWA 3/6 GWA 2/6 GWA 1/6 MA MA MA       Adriana received the following manual therapy techniques: were applied to the: lumbar and bilateral LE's for 5 minutes, including: NOT TODAY  Manual stretching of bilateral hamstrings  Manual single knee to chest    Home Exercises Provided and Patient Education Provided     Education provided:   - keeping exercises in a pain free range.  - rest when feeling fatigued.     Written Home Exercises Provided: yes.  Exercises were reviewed and Adriana was able to demonstrate them prior to the end of the session.  Adriana demonstrated good  understanding of the education provided.     See EMR under Patient Instructions for exercises provided 07/24/2019.    Assessment     Patient had follow up with her Gynecologist Monday and reports she fainted while at MD office.  She was sent to ER and test ran.  She followed up with cardiologist yesterday and reports test were normal and she is able to continue with therapy.  Pt performs standing exercises first on this date and did not perform supine exercises due to recent increase in difficulty breathing.  Adriana requires frequent rest breaks and cues to take breaks between sets in order to limit dizziness and shortness of breath.      Adriana is progressing well towards her goals.   Pt prognosis is Good.     Pt will  continue to benefit from skilled outpatient physical therapy to address the deficits listed in the problem list box on initial evaluation, provide pt/family education and to maximize pt's level of independence in the home and community environment.     Pt's spiritual, cultural and educational needs considered and pt agreeable to plan of care and goals.     Anticipated barriers to physical therapy: none    Goals:   Short Term Goals: 4 weeks  1. Patient will be compliant with HEP to promote the independent management of current diagnosis. MET  2. Patient will increase lumbar forward bending to reach mid shin level for function of dressing.  MET  3. Patient will improve bilateral hamstring flexibility to (30)deg during 90/90 testing.  In progress  4. Patient will report a decrease in complaints of low back pain from 10/10 to 6/10 during ADLs. MET    Long Term Goals:  8 weeks  1. Patient will improve core stabilization strength to Good to improve tolerance to normal house work activities for self care independence. In progress  2. Patient will increase standing balance to Good. In progress  3. Patient will report a decrease in complaints of low back pain from 10/10 to 4/10 during ADLs. In progress  4. Patient will improve FOTO limitation status from 58% to 45% placing the patient in the stage 3 impaired, limited, or restricted category indicating increased functional mobility. In progress    Plan     Resume all exercises next visit.     Krzysztof Russell, PT sd

## 2019-08-07 ENCOUNTER — CLINICAL SUPPORT (OUTPATIENT)
Dept: REHABILITATION | Facility: HOSPITAL | Age: 74
End: 2019-08-07
Attending: PSYCHIATRY & NEUROLOGY
Payer: MEDICARE

## 2019-08-07 DIAGNOSIS — M62.81 MUSCLE WEAKNESS: ICD-10-CM

## 2019-08-07 DIAGNOSIS — M54.41 CHRONIC BILATERAL LOW BACK PAIN WITH BILATERAL SCIATICA: ICD-10-CM

## 2019-08-07 DIAGNOSIS — R26.89 BALANCE PROBLEMS: ICD-10-CM

## 2019-08-07 DIAGNOSIS — G89.29 CHRONIC BILATERAL LOW BACK PAIN WITH BILATERAL SCIATICA: ICD-10-CM

## 2019-08-07 DIAGNOSIS — M54.42 CHRONIC BILATERAL LOW BACK PAIN WITH BILATERAL SCIATICA: ICD-10-CM

## 2019-08-07 DIAGNOSIS — R26.2 DIFFICULTY WALKING: ICD-10-CM

## 2019-08-07 PROCEDURE — 97110 THERAPEUTIC EXERCISES: CPT | Mod: PO

## 2019-08-07 PROCEDURE — G8991 OTHER PT/OT GOAL STATUS: HCPCS | Mod: CK,PO

## 2019-08-07 PROCEDURE — G8990 OTHER PT/OT CURRENT STATUS: HCPCS | Mod: CK,PO

## 2019-08-07 PROCEDURE — G8992 OTHER PT/OT  D/C STATUS: HCPCS | Mod: CK,PO

## 2019-08-07 NOTE — PROGRESS NOTES
Physical Therapy Daily Treatment Note     Name: Adriana Strogn  Clinic Number: [id number]    Therapy Diagnosis:   Encounter Diagnoses   Name Primary?    Chronic bilateral low back pain with bilateral sciatica     Difficulty walking     Muscle weakness     Balance problems      Physician: Sean Saravia MD    Visit Date: 8/7/2019    Physician Orders: PT Eval and Treat   Medical Diagnosis from Referral: M48.062 (ICD-10-CM) - Spinal stenosis of lumbar region with neurogenic claudication  Evaluation Date: 6/6/2019  Authorization Period Expiration: 5/21/20  Plan of Care Expiration: 8/6/19  Visit # / Visits authorized: 17/ pending    Time In: 9:00 am  Time Out: 9:50 am  Total Billable Time: 40 minutes    Precautions: Standard and Fall    Subjective     Pt reports: she is not having low back pain at this time and is requesting to be discharged from therapy.    She was compliant with home exercise program.  Response to previous treatment: muscle soreness  Functional change: improved tolerance to bending and ADL's    Pain: 0/10  Location: bilateral back      Objective     Adriana received therapeutic exercises to develop strength, endurance, ROM, flexibility, posture and core stabilization for 40 minutes including:      Date  8/7/19 7/31/19 07/29/19 07/24/19 7/22/19 07/17/19 07/15/19 07/10/19 7/8/19 07/03 07/01/19 06//26/19 06/24/19 06/19/19 6/17/19 6/12/19 6/10/19   VISIT 17 16/pending 15/ pending  14/pending 13 12/12 11/12 10/12 9/12 8/12 7/12 6/12 5/12 4/12 4 3 2/pending   G CODE VISIT 8/10 7/10 6/10 5/10 4/10 3/10 2/10 1/10 9/10 8/10 7/10 7/10 6/10 5/10 4/10 310 2/10   POC EXP. DATE 8/6/19 8/6/19 08/06/19 08/06/19 8/6/19 08/06/19 08/06/19 08/06/19 8/6/19 08/06/19 08/06/19 08/06/19 08/06/19 08/06/19 8/6/19 8/6/19 8/6/19   VISIT AMOUNT  MEDICARE TOTAL 90.96  1295.68 60.64  1204.72 60.64  1144.08 90.96  1083.44 60.64  992.48 90.96  931.84 60.64  840.88 90.96  780.24 60.64  689.28 60.64  628.64 90.96  568.00  "60.64  477.04 90.96  416.40 60.64  325.44 60.64  264.80 60.64  204.16 60.64  143.52   FACE-TO-FACE 8/6/19 8/6/19 08/06/19 08/06/19 8/6/19 08/06/19 08/06/19 08/06/19 8/6/19 07/06/19 07/06/19 07/06/19 07/06/19 07/06/19 7/6/19 7/6/19 7/6/19   FOTO                3/5 2/5                       MT  -- Not today Not today Not today See below See below Not today See below See below See below         TABLE:                    Quad sets -- -- 30 x 5" 30 x 5' 30 x 5" 30 x 5" 30 x 5" 30 x 5" 30 x 5" 30 x 5" 3 x 10 x 5" 3 x 10 x 3" 3 x 10 x 3" 3 x 10 x 3" 3 x 10 x 3" 3 x 10 x 3" 2 x10 x 3"   TA -- -- 20 x 3" 20 x 3" 20 x 3" 20 x 3" 20 x 3" 20 x 3" 2 x 10 x 3" 20 x 3" 2 x 10 x 3" 2 x 10 x 3" 2 x 10 x 3" 3 x 10 x 3" 3 x 10 x 3" 3 x 10 x 3" 2 x 10 x 3"   bridging -- -- 3 x 10 3 x 10 3 x 10 2 x 10 2 x 10 2 x 10 2 x 10 2 x 10 2 x 10 2 x 10 2 x 10 2 x 10 2 x 10 1 x 10 2 x 5   LTR -- -- 2 x 10 2 x 10 2 x 10 2 x 10 2 x 10 2 x 10 2 x 10 2 x 10 2 x 10 2 x 10 2 x 10 2 x 10 2  X 10 2 x 10 2 x 10   TA with March -- -- 2 x 10 2 x 10 2 x 10 2 x 10 2 x 10 2 x 10 2 x 10 2 x 10 2 x 10 2 x 10 2 x 10 Not today 2 x 10 Next?    Hip abduction - seated 3 x 10 BTB 3 x 10 BTB 3 x 10 BTB 3 x 10 BTB  3 x 10 x BTB 3 x 10 BTB 3 x 10 GTB 3 x 10 GTB 3 x 10 GTB 2 x 10 GTB 3 x 10 RTB 3 x 10 RTB 3 x 10 RTB Not today 3 x 10 RTB 2 x 10 RTB    Hip adduction - seated 30 x 3" w/ball 30 x 3" w/ball 30 x 3" w/ ball 30 x 3" w/ ball 30 x 3" w/ball 30 x 3" w/ball 30 x 3" w/ball 30 x 3" w/ ball 30 x 3" w/ball 30 x 3" w/ ball 30 x 3" w/ ball 30 x 3" w/ ball 30 x 3" w/ ball Not today 30 x 3" w/ball 20 x 3" w/ball    SEATED:                    LAQ 3 x 10 x 2# 2 x 10 x 2# 2 x 10 x 2# 3 x 10 x 1.5# 3 x 10 x 1.5# 3 x 10 x 1.5# 3 x 10 x 1# 3 x 10 x 0# 3 x 10 x 1# 3 x 10 3 x 10 2 x 10 2 x 10 2 x 10 2 x 10 2 x 10 2 x 10   TA  1 x 10 x 3" 2 x 10 x 3" 1 x 10 1 x 10 1 x 10 1 x 10 1 x 10 1 x 10 1 x 10 1 x 10 1 x 10 1 x 10 1 x 10 1 x 10 1 x 10 1 x 10 1 x 10   TA with march 2 x 10 2 x10 2 " x 10 2 x 10 2 x 10 2 x 10 2 x 10 2 x 10 2 x 10 2 x 10 2 x 10 2 x 10 2 x 10 2 x 10 2 x 10 2 x 10 2 x 10   Seated trunk twist 1 x 10 1 x 10                  HS curls 3 x 10 BTB 2 x 10 BTB 2 x 10 BTB 3 x 10 GTB 3 x 10 GTB 3 x 10 GTB 2 x 10 RTB 2 x 10 RTB 1 x 10 RTB 1 x 10 RTB          STANDING:                    Sit to stand 2 x 5 2 x 5 2 x 5 2 x 5 2 x 5 2 x 5              Hip abduction 1 x 10 1 x 10 Not today 1 x 10 1 x 10 1 x 10              Hip extension 1 x 10 1 x 10 Not today 1 x 10 1 x 10               Mini squats 1 x 10, 1 x 8 2 x 5 Not today 1 x 5                                    Recumbent bike seat 11 L1 x 5' L1 x 5' L1 x 5' L1 x 5 L1 x 5 L1 x 5 L2 x 5 Not today L1 x 5' L1 x 5' L1 x 5' L1 x 5' L1 x 5' L1 x 5' 15 minutes 8 minutes  8 minutes                       Initials MA MA GWA 2/6 GWA 1/6 MA MA MA GWA 1/6 MA GWA 5/6 GWA 4/6 GWA 3/6 GWA 2/6 GWA 1/6 MA MA MA       Lumbar Range of Motion:     Degrees Pain   Flexion Reaches to lower shin    [place]       Extension Not tested    Not tested         Left Side Bending Reaches to mid thigh no       Right Side Bending Reaches to mid thigh no               Lower Extremity Strength  Right LE   Left LE     Knee extension: 4/5 Knee extension: 4/5   Knee flexion: 4+/5 Knee flexion: 4/5   Hip flexion: 345 Hip flexion: 4-/5   Hip extension:  Not tested Hip extension: Not tested   Hip abduction: 4/5 Hip abduction: 4/5   Hip adduction: 4/5 Hip adduction 4/5   Ankle dorsiflexion: 4/5 Ankle dorsiflexion: 4/5   Ankle plantarflexion: 4/5 Ankle plantarflexion: 4/5      90/90 hamstring testing is 30 deg bilaterally    FOTO survery: pt scored 42% limitation on survey    Home Exercises Provided and Patient Education Provided     Education provided:   - keeping exercises in a pain free range.  - rest when feeling fatigued.     Written Home Exercises Provided: yes.  Exercises were reviewed and Adriana was able to demonstrate them prior to the end of the session.  Adriana johnson  good  understanding of the education provided.     See EMR under Patient Instructions for exercises provided 07/24/2019.    Assessment     Patient is reporting decreased back pain since beginning therapy and is noted to have improved ROM and strength.  She has been able to progress towards goals and met all goals except LTG #1 and 21.  Adriana will be discharged from PT to [facility] for maintenance per her request.       Adriana is progressing well towards her goals.   Pt prognosis is Good.     Pt will continue to benefit from skilled outpatient physical therapy to address the deficits listed in the problem list box on initial evaluation, provide pt/family education and to maximize pt's level of independence in the home and community environment.     Pt's spiritual, cultural and educational needs considered and pt agreeable to plan of care and goals.     Anticipated barriers to physical therapy: none    Goals:   Short Term Goals: 4 weeks  1. Patient will be compliant with HEP to promote the independent management of current diagnosis. MET  2. Patient will increase lumbar forward bending to reach mid shin level for function of dressing.  MET  3. Patient will improve bilateral hamstring flexibility to (30)deg during 90/90 testing.  MET  4. Patient will report a decrease in complaints of low back pain from 10/10 to 6/10 during ADLs. MET    Long Term Goals:  8 weeks  1. Patient will improve core stabilization strength to Good to improve tolerance to normal house work activities for self care independence. Partially met  2. Patient will increase standing balance to Good. Partially met  3. Patient will report a decrease in complaints of low back pain from 10/10 to 4/10 during ADLs. MET  4. Patient will improve FOTO limitation status from 58% to 45% placing the patient in the stage 3 impaired, limited, or restricted category indicating increased functional mobility. MET    Plan     Patient will be discharged from PT to [facility] for  maintenance with goals met except LTG # 1, 2    Krzysztof Russell, PT

## 2019-08-07 NOTE — PLAN OF CARE
Outpatient Therapy Discharge Summary     Name: Adriana Strong  [facility] Number: [id number]    Therapy Diagnosis:   Encounter Diagnoses   Name Primary?    Chronic bilateral low back pain with bilateral sciatica     Difficulty walking     Muscle weakness     Balance problems      Physician: Sean Saravia MD    Physician Orders: PT Eval and Treat   Medical Diagnosis: M48.062 (ICD-10-CM) - Spinal stenosis of lumbar region with neurogenic claudication  Evaluation Date: 6/6/2019      Date of Last visit: 8/7/19  Total Visits Received: 17  Cancelled Visits: 1  No Show Visits: 0    Assessment    Goals:   Short Term Goals: 4 weeks  1. Patient will be compliant with HEP to promote the independent management of current diagnosis. MET  2. Patient will increase lumbar forward bending to reach mid shin level for function of dressing.  MET  3. Patient will improve bilateral hamstring flexibility to (30)deg during 90/90 testing.  MET  4. Patient will report a decrease in complaints of low back pain from 10/10 to 6/10 during ADLs. MET     Long Term Goals:  8 weeks  1. Patient will improve core stabilization strength to Good to improve tolerance to normal house work activities for self care independence. Partially met  2. Patient will increase standing balance to Good. Partially met  3. Patient will report a decrease in complaints of low back pain from 10/10 to 4/10 during ADLs. MET  4. Patient will improve FOTO limitation status from 58% to 45% placing the patient in the stage 3 impaired, limited, or restricted category indicating increased functional mobility. MET    Discharge reason: Patient requested discharge    Plan   This patient is discharged from Physical Therapy

## 2019-08-14 ENCOUNTER — TELEPHONE (OUTPATIENT)
Dept: CARDIOLOGY | Facility: CLINIC | Age: 74
End: 2019-08-14

## 2019-08-14 ENCOUNTER — HOSPITAL ENCOUNTER (OUTPATIENT)
Dept: CARDIOLOGY | Facility: HOSPITAL | Age: 74
Discharge: HOME | End: 2019-08-14
Attending: INTERNAL MEDICINE
Payer: MEDICARE

## 2019-08-14 DIAGNOSIS — I48.19 PERSISTENT ATRIAL FIBRILLATION: ICD-10-CM

## 2019-08-14 DIAGNOSIS — I35.0 MODERATE AORTIC STENOSIS: ICD-10-CM

## 2019-08-14 LAB
AORTIC ROOT ANNULUS: 2.47 CM
AORTIC VALVE CUSP SEPERATION: 0.48 CM
AV INDEX (PROSTH): 0.23
AV MEAN GRADIENT: 34 MMHG
AV PEAK GRADIENT: 49 MMHG
AV VALVE AREA: 0.78 CM2
AV VELOCITY RATIO: 0.25
CV ECHO LV RWT: 0.72 CM
DOP CALC AO PEAK VEL: 3.49 M/S
DOP CALC AO VTI: 84.47 CM
DOP CALC LVOT AREA: 3.4 CM2
DOP CALC LVOT DIAMETER: 2.08 CM
DOP CALC LVOT PEAK VEL: 0.87 M/S
DOP CALC LVOT STROKE VOLUME: 65.65 CM3
DOP CALCLVOT PEAK VEL VTI: 19.33 CM
ECHO LV POSTERIOR WALL: 1.42 CM (ref 0.6–1.1)
FRACTIONAL SHORTENING: 32 % (ref 28–44)
INTERVENTRICULAR SEPTUM: 1.28 CM (ref 0.6–1.1)
IVC PROX: 2 CM
LA MAJOR: 5.82 CM
LA MINOR: 5.29 CM
LA WIDTH: 4.5 CM
LEFT ATRIUM SIZE: 5.2 CM
LEFT ATRIUM VOLUME: 110.24 CM3
LEFT INTERNAL DIMENSION IN SYSTOLE: 2.69 CM (ref 2.1–4)
LEFT VENTRICLE DIASTOLIC VOLUME: 68.5 ML
LEFT VENTRICLE SYSTOLIC VOLUME: 26.65 ML
LEFT VENTRICULAR INTERNAL DIMENSION IN DIASTOLE: 3.96 CM (ref 3.5–6)
LEFT VENTRICULAR MASS: 194.71 G
LV SEPTAL E/E' RATIO: 16.43 M/S
MV PEAK E VEL: 1.15 M/S
PISA TR MAX VEL: 2.87 M/S
PULM VEIN S/D RATIO: 0.25
PV PEAK D VEL: 0.8 M/S
PV PEAK S VEL: 0.2 M/S
PV PEAK VELOCITY: 0.86 CM/S
RA MAJOR: 5.43 CM
RA PRESSURE: 15 MMHG
RA WIDTH: 4 CM
RIGHT VENTRICULAR END-DIASTOLIC DIMENSION: 2.7 CM
SINUS: 2.5 CM
TDI SEPTAL: 0.07 M/S
TR MAX PG: 33 MMHG
TRICUSPID ANNULAR PLANE SYSTOLIC EXCURSION: 2.3 CM
TV REST PULMONARY ARTERY PRESSURE: 48 MMHG

## 2019-08-14 PROCEDURE — 93227 XTRNL ECG REC<48 HR R&I: CPT | Mod: ,,, | Performed by: INTERNAL MEDICINE

## 2019-08-14 PROCEDURE — 93306 TTE W/DOPPLER COMPLETE: CPT | Mod: 26,,, | Performed by: INTERNAL MEDICINE

## 2019-08-14 PROCEDURE — 93227 HOLTER MONITOR - 24 HOUR (CUPID ONLY): ICD-10-PCS | Mod: ,,, | Performed by: INTERNAL MEDICINE

## 2019-08-14 PROCEDURE — 93306 TTE W/DOPPLER COMPLETE: CPT | Mod: PO

## 2019-08-14 PROCEDURE — 93225 XTRNL ECG REC<48 HRS REC: CPT | Mod: PO

## 2019-08-14 PROCEDURE — 93306 TRANSTHORACIC ECHO (TTE) COMPLETE (CUPID ONLY): ICD-10-PCS | Mod: 26,,, | Performed by: INTERNAL MEDICINE

## 2019-08-15 NOTE — TELEPHONE ENCOUNTER
Echocardiogram is similar to echo done in January. Aortic stenosis appears severe.  Discussed option of TAVR if symptoms of shortness of breath or chest pain or fainting are a problem for patient.  Pt states she does not feel bad enough to consider TAVR at this time even though she is short of breath after climbing only 4 steps now where she used to be able to climb 6 steps.

## 2019-08-16 ENCOUNTER — TELEPHONE (OUTPATIENT)
Dept: CARDIOLOGY | Facility: CLINIC | Age: 74
End: 2019-08-16

## 2019-08-16 LAB
OHS CV EVENT MONITOR DAY: 0
OHS CV HOLTER LENGTH DECIMAL HOURS: 24
OHS CV HOLTER LENGTH HOURS: 24
OHS CV HOLTER LENGTH MINUTES: 0

## 2019-08-16 RX ORDER — DILTIAZEM HYDROCHLORIDE EXTENDED-RELEASE TABLETS 240 MG/1
240 TABLET, EXTENDED RELEASE ORAL DAILY
Qty: 90 TABLET | Refills: 3 | Status: SHIPPED | OUTPATIENT
Start: 2019-08-16 | End: 2019-12-18

## 2019-08-16 NOTE — TELEPHONE ENCOUNTER
Holter shows a fib with an often rapid ventricular response.  Pt is aware of palpitations.  Will increase the diltiazem 24 hr cap to 240 mg daily.  Discussed with pt.

## 2019-09-05 ENCOUNTER — OFFICE VISIT (OUTPATIENT)
Dept: CARDIOLOGY | Facility: CLINIC | Age: 74
End: 2019-09-05
Payer: MEDICARE

## 2019-09-05 VITALS
WEIGHT: 234.69 LBS | SYSTOLIC BLOOD PRESSURE: 137 MMHG | HEIGHT: 64 IN | BODY MASS INDEX: 40.07 KG/M2 | DIASTOLIC BLOOD PRESSURE: 80 MMHG | HEART RATE: 86 BPM

## 2019-09-05 DIAGNOSIS — E11.42 DIABETIC POLYNEUROPATHY ASSOCIATED WITH TYPE 2 DIABETES MELLITUS: ICD-10-CM

## 2019-09-05 DIAGNOSIS — I35.0 SEVERE AORTIC STENOSIS: Primary | ICD-10-CM

## 2019-09-05 DIAGNOSIS — I48.20 CHRONIC ATRIAL FIBRILLATION: ICD-10-CM

## 2019-09-05 DIAGNOSIS — E78.00 PURE HYPERCHOLESTEROLEMIA: ICD-10-CM

## 2019-09-05 DIAGNOSIS — K55.1 CHRONIC MESENTERIC ISCHEMIA: ICD-10-CM

## 2019-09-05 DIAGNOSIS — J45.20 INTERMITTENT ASTHMA, UNSPECIFIED ASTHMA SEVERITY, UNSPECIFIED WHETHER COMPLICATED: ICD-10-CM

## 2019-09-05 DIAGNOSIS — K21.9 GASTROESOPHAGEAL REFLUX DISEASE, ESOPHAGITIS PRESENCE NOT SPECIFIED: ICD-10-CM

## 2019-09-05 DIAGNOSIS — I10 ESSENTIAL HYPERTENSION: ICD-10-CM

## 2019-09-05 DIAGNOSIS — Z86.73 OLD LACUNAR STROKE WITHOUT LATE EFFECT: ICD-10-CM

## 2019-09-05 DIAGNOSIS — E11.42 CONTROLLED TYPE 2 DIABETES MELLITUS WITH DIABETIC POLYNEUROPATHY, WITHOUT LONG-TERM CURRENT USE OF INSULIN: ICD-10-CM

## 2019-09-05 DIAGNOSIS — Z79.01 WARFARIN ANTICOAGULATION: ICD-10-CM

## 2019-09-05 DIAGNOSIS — K55.1 MESENTERIC ARTERY INSUFFICIENCY: ICD-10-CM

## 2019-09-05 DIAGNOSIS — J41.0 SIMPLE CHRONIC BRONCHITIS: ICD-10-CM

## 2019-09-05 PROCEDURE — 99214 PR OFFICE/OUTPT VISIT, EST, LEVL IV, 30-39 MIN: ICD-10-PCS | Mod: S$GLB,,, | Performed by: INTERNAL MEDICINE

## 2019-09-05 PROCEDURE — 99214 OFFICE O/P EST MOD 30 MIN: CPT | Mod: S$GLB,,, | Performed by: INTERNAL MEDICINE

## 2019-09-05 PROCEDURE — 3079F PR MOST RECENT DIASTOLIC BLOOD PRESSURE 80-89 MM HG: ICD-10-PCS | Mod: CPTII,S$GLB,, | Performed by: INTERNAL MEDICINE

## 2019-09-05 PROCEDURE — 3079F DIAST BP 80-89 MM HG: CPT | Mod: CPTII,S$GLB,, | Performed by: INTERNAL MEDICINE

## 2019-09-05 PROCEDURE — 99999 PR PBB SHADOW E&M-EST. PATIENT-LVL III: CPT | Mod: PBBFAC,,, | Performed by: INTERNAL MEDICINE

## 2019-09-05 PROCEDURE — 1101F PT FALLS ASSESS-DOCD LE1/YR: CPT | Mod: CPTII,S$GLB,, | Performed by: INTERNAL MEDICINE

## 2019-09-05 PROCEDURE — 3075F PR MOST RECENT SYSTOLIC BLOOD PRESS GE 130-139MM HG: ICD-10-PCS | Mod: CPTII,S$GLB,, | Performed by: INTERNAL MEDICINE

## 2019-09-05 PROCEDURE — 99999 PR PBB SHADOW E&M-EST. PATIENT-LVL III: ICD-10-PCS | Mod: PBBFAC,,, | Performed by: INTERNAL MEDICINE

## 2019-09-05 PROCEDURE — 3075F SYST BP GE 130 - 139MM HG: CPT | Mod: CPTII,S$GLB,, | Performed by: INTERNAL MEDICINE

## 2019-09-05 PROCEDURE — 1101F PR PT FALLS ASSESS DOC 0-1 FALLS W/OUT INJ PAST YR: ICD-10-PCS | Mod: CPTII,S$GLB,, | Performed by: INTERNAL MEDICINE

## 2019-09-05 RX ORDER — BISACODYL 5 MG
5 TABLET, DELAYED RELEASE (ENTERIC COATED) ORAL DAILY PRN
Status: ON HOLD | COMMUNITY
End: 2022-08-12 | Stop reason: SDUPTHER

## 2019-09-05 RX ORDER — LISINOPRIL 20 MG/1
20 TABLET ORAL 2 TIMES DAILY
Qty: 180 TABLET | Refills: 3 | Status: SHIPPED | OUTPATIENT
Start: 2019-09-05 | End: 2020-06-29

## 2019-09-05 NOTE — PROGRESS NOTES
Subjective:      Patient ID: Adriana Strong is a 74 y.o. female.    Chief Complaint: Follow-up    HPI:  Pt still c/o shortness of breath with exertion.  Pt saw Dr Zelalem Grant who felt her reactive airway disease was well controlled.  Pt c/o palpitations with exertion in spite of higher dose of diltiazem.  Pt c./o swelling of legs  Pt c/o tightness in her chest with exertion.    Review of Systems   Cardiovascular: Positive for chest pain, dyspnea on exertion, leg swelling and palpitations. Negative for claudication, irregular heartbeat, near-syncope, orthopnea and syncope.      Pt feels no better since empirically reducing the doses of gabapentin and ditropan    Pt reports that BP at home varies from 130 to 150 systolic    Past Medical History:   Diagnosis Date    Anticoagulant long-term use     on Plavix since May 2015    Anxiety     Arthritis     Asthma     Atrial fibrillation     Cataracts, bilateral     Chest pain, atypical     Colon polyp     Coronary artery disease     Diabetes mellitus     Dry eyes     Esophageal erosions     GERD (gastroesophageal reflux disease)     Heart murmur     Hemorrhoid     High cholesterol     Hypertension     Irritable bowel syndrome     Shingles 2015    TIA (transient ischemic attack)     Use of cane as ambulatory aid         Past Surgical History:   Procedure Laterality Date    ABDOMINAL SURGERY      angiocele      2007, 2014    ANGIOGRAM-ABDOMINAL N/A 5/5/2015    Performed by [facility] at [facility] OR 2ND FLR    ANGIOGRAM-ABDOMINAL SMA stent Left Brachial access. Right arm over head. Left 4/29/2015    Performed by [facility] at [facility] OR 2ND [facility]LR    ANGIOGRAM-ABDOMINAL-INTESTINAL - Mesenteric angiogram Right CFA access, possible left arm access  Right 1/5/2016    Performed by [facility] at [facility] OR 2ND [facility]LR    ANGIOGRAM-MESENTERIC  1/5/2016    Performed by [facility] at [facility] OR 2ND [facility]    BREAST SURGERY      left--- a  lump--- no cancer    COLONOSCOPY  2014    COLONOSCOPY N/A 3/14/2018    Performed by [facility]emp [facility] at [facility] ENDO (4TH FLR)    ESOPHAGOGASTRODUODENOSCOPY (EGD) N/A 2/22/2016    Performed by [facility] at [facility] ENDO (4TH FLR)    EXPLORATION-BLEEDING (RE-EXPLORATION) Left 5/7/2015    Per[facility] by [facility] at [facility] OR 2ND [facility]LR    HERNIA REPAIR      HYSTERECTOMY  partial    1982 partial hysterectomy    left nasal polyp      left neck lymph node      nose polyp      PLACEMENT-STENT Right 1/5/2016    Performed by [facility] at [facility] OR 2ND [facility]LR    REPAIR-HERNIA-LAPAROSCOPIC-INCISIONAL w/ mesh N/A 6/12/2017    Performed by Jay Milligan MD at [facility] OR [address] [place] [address]    right hip fatty mass tissue      stent to small intestine      SUPERIOR VENA CAVA ANGIOPLASTY / STENTING      THROMBECTOMY - left brachial artery Left 5/5/2015    Performed by [facility] at [facility] OR 2ND FLR    TONSILLECTOMY      TOTAL ABDOMINAL HYSTERECTOMY      2014    TOTAL KNEE ARTHROPLASTY Bilateral     UPPER GASTROINTESTINAL ENDOSCOPY  2014       Family History   Problem Relation Age of Onset    Colon cancer Neg Hx     Inflammatory bowel disease Neg Hx        Social History     Socioeconomic History    Marital status:      Spouse name: Not on file    Number of children: Not on file    Years of education: Not on file    Highest education level: Not on file   Occupational History    Not on file   Social Needs    Financial resource strain: Not on file    Food insecurity:     Worry: Not on file     Inability: Not on file    Transportation needs:     Medical: Not on file     Non-medical: Not on file   Tobacco Use    Smoking status: Never Smoker    Smokeless tobacco: Never Used   Substance and Sexual Activity    Alcohol use: No    Drug use: No    Sexual activity: Not on file   Lifestyle    Physical activity:     Days per week: Not on file     Minutes per session: Not on file    Stress: Not on  file   Relationships    Social connections:     Talks on phone: Not on file     Gets together: Not on file     Attends Jainism service: Not on file     Active member of club or organization: Not on file     Attends meetings of clubs or organizations: Not on file     Relationship status: Not on file   Other Topics Concern    Not on file   Social History Narrative    Not on file       Current Outpatient Medications on File Prior to Visit   Medication Sig Dispense Refill    acetaminophen (TYLENOL ARTHRITIS) 650 MG TbSR Take 1,300 mg by mouth 2 (two) times daily.      albuterol (ACCUNEB) 1.25 mg/3 mL Nebu Take scheduled q4 for the next two days while at home then resume prn dosing 3 mL 1    albuterol (VENTOLIN HFA) 90 mcg/actuation inhaler Inhale 2 puffs into the lungs every 6 (six) hours as needed for Wheezing. Rescue      apixaban (ELIQUIS) 5 mg Tab Take 1 tablet (5 mg total) by mouth 2 (two) times daily. 180 tablet 3    ascorbic acid (VITAMIN C) 500 MG tablet Take 500 mg by mouth once daily.      azelastine (ASTELIN) 137 mcg nasal spray 1 spray by Nasal route 2 (two) times daily.      bisacodyl (DULCOLAX) 5 mg EC tablet Take 5 mg by mouth daily as needed for Constipation.      budesonide-formoterol 160-4.5 mcg (SYMBICORT) 160-4.5 mcg/actuation HFAA Inhale 2 puffs into the lungs every 12 (twelve) hours. Controller      calcium carbonate (OS-MICHAEL) 600 mg (1,500 mg) Tab Take 1,200 mg by mouth once daily.      clopidogrel (PLAVIX) 75 mg tablet Take 1 tablet (75 mg total) by mouth once daily. 90 tablet 4    cyanocobalamin (VITAMIN B-12) 1000 MCG tablet Take 100 mcg by mouth once daily.      digoxin (LANOXIN) 125 mcg tablet Take 1 tablet (125 mcg total) by mouth once daily. 90 tablet 3    diltiaZEM HCl (CARDIZEM LA) 240 mg 24 hr tablet Take 1 tablet (240 mg total) by mouth once daily. 90 tablet 3    DULoxetine (CYMBALTA) 20 MG capsule Take 1 capsule (20 mg total) by mouth 2 (two) times daily. 180 capsule  3    fexofenadine (ALLEGRA) 60 MG tablet Take 60 mg by mouth every morning.      fish oil-omega-3 fatty acids 300-1,000 mg capsule Take 1 g by mouth once daily.       fluticasone (FLONASE) 50 mcg/actuation nasal spray 1 spray by Each Nare route every morning.       furosemide (LASIX) 40 MG tablet Take 1 tablet (40 mg total) by mouth once daily. 90 tablet 3    gabapentin (NEURONTIN) 300 MG capsule Take 4 capsules (1,200 mg total) by mouth 3 (three) times daily. (Patient taking differently: Take 600 mg by mouth 3 (three) times daily. ) 360 capsule 3    lorazepam (ATIVAN) 0.5 MG tablet Take 0.5 mg by mouth every morning.       magnesium 250 mg Tab Take 250 mg by mouth. 3 in am and 3 pm      metFORMIN (GLUCOPHAGE) 500 MG tablet Take 500 mg by mouth 2 (two) times daily.  1    ondansetron (ZOFRAN-ODT) 4 MG TbDL DISSOLVE 1 TABLET ON TONGUE EVERY 8 HOURS AS NEEDED FOR NAUSEA  1    oxybutynin (DITROPAN-XL) 5 MG TR24 Take 5 mg by mouth every other day.       pantoprazole (PROTONIX) 40 MG tablet Take 1 tablet (40 mg total) by mouth once daily. (Patient taking differently: Take 40 mg by mouth every morning. ) 90 tablet 3    POLYSORBATE 80/GLYCERIN (REFRESH DRY EYE THERAPY OPHT) Apply to eye 2 (two) times daily.      potassium gluconate 550 mg (90 mg) Tab Take by mouth. 2 in am and 2 pm      traMADol (ULTRAM) 50 mg tablet Take 1 tablet (50 mg total) by mouth every 6 (six) hours as needed for Pain. 20 tablet 0    zafirlukast (ACCOLATE) 20 MG tablet Take 20 mg by mouth once daily.  3    [DISCONTINUED] lisinopril (PRINIVIL,ZESTRIL) 20 MG tablet Take 1 tablet (20 mg total) by mouth once daily. 90 tablet 3    [DISCONTINUED] docusate sodium (COLACE) 100 MG capsule Take 1 capsule (100 mg total) by mouth 2 (two) times daily. 60 capsule 1    [DISCONTINUED] polyethylene glycol (GLYCOLAX) 17 gram PwPk Take 17 g by mouth once daily.       No current facility-administered medications on file prior to visit.        Review of  "patient's allergies indicates:   Allergen Reactions    Celebrex [celecoxib] Shortness Of Breath    Ciprofloxacin Swelling     lip    Dicyclomine Hives    Adhesive Dermatitis    Avelox [moxifloxacin] Swelling    Cilostazol Other (See Comments)     Elevates blood pressure    Eryc [erythromycin] Other (See Comments)     unknown    Iodine and iodide containing products Hives    Keflex [cephalexin] Hives    Meclomen      rashes    Meloxicam      Ear ringing    Metoclopramide Other (See Comments)     High blood pressure    Sulfa (sulfonamide antibiotics) Itching     Objective:     Vitals:    09/05/19 1312 09/05/19 1333   BP: (!) 157/92 137/80   BP Location: Left arm Right arm   Patient Position: Sitting Sitting   BP Method: Large (Automatic)    Pulse: 86    Weight: 106.4 kg (234 lb 10.9 oz)    Height: 5' 4" (1.626 m)         Physical Exam   Constitutional: She is oriented to person, place, and time. She appears well-developed and well-nourished.   Eyes: No scleral icterus.   Neck: No JVD present. Carotid bruit is not present.   Cardiovascular: Normal rate. An irregularly irregular rhythm present. Exam reveals no gallop.   Murmur (IV/VI systolic) heard.  Pulmonary/Chest: Breath sounds normal.   Musculoskeletal: She exhibits no edema.   Neurological: She is alert and oriented to person, place, and time.   Skin: Skin is warm and dry.   Psychiatric: She has a normal mood and affect. Her behavior is normal. Judgment and thought content normal.   Vitals reviewed.     Recent holter monitor showed a fib with, at times, RVR--dose of diltiazem was increased.    Note recent echo shows severe but not critical aortic stenosis,  LVEF was normal    Note recent dobutamine stress echo did not show evidence of stress ischemia.    Wt up one pound    Note CBC and CMP from July done by Dr Abbi JONES    Assessment:     1. Severe aortic stenosis    2. Chronic atrial fibrillation    3. Essential hypertension    4. Pure " hypercholesterolemia    5. Simple chronic bronchitis    6. Intermittent asthma, unspecified asthma severity, unspecified whether complicated    7. Old lacunar stroke without late effect    8. Diabetic polyneuropathy associated with type 2 diabetes mellitus    9. Warfarin anticoagulation    10. Controlled type 2 diabetes mellitus with diabetic polyneuropathy, without long-term current use of insulin    11. Chronic mesenteric ischemia    12. Mesenteric artery insufficiency    13. Gastroesophageal reflux disease, esophagitis presence not specified      Plan:   Adriana was seen today for follow-up.    Diagnoses and all orders for this visit:    Severe aortic stenosis  -     Cancel: Ambulatory consult to Cardiology  -     Ambulatory consult to Cardiology    Chronic atrial fibrillation    Essential hypertension    Pure hypercholesterolemia    Simple chronic bronchitis    Intermittent asthma, unspecified asthma severity, unspecified whether complicated    Old lacunar stroke without late effect    Diabetic polyneuropathy associated with type 2 diabetes mellitus    Warfarin anticoagulation    Controlled type 2 diabetes mellitus with diabetic polyneuropathy, without long-term current use of insulin    Chronic mesenteric ischemia    Mesenteric artery insufficiency    Gastroesophageal reflux disease, esophagitis presence not specified    Other orders  -     lisinopril (PRINIVIL,ZESTRIL) 20 MG tablet; Take 1 tablet (20 mg total) by mouth 2 (two) times daily.     Pt has severe but not critical aortic stenosis.    It is not certain whether or not pt's aortic stenosis is symptomatic.    Although pt's shortness of breath and chest discomfort and edema and palpitations could represent symptomatic aortic stenosis.    However, pt has asthma and pulmonary hypertension which could explain shortness of breath with exertion (along with obesity and deconditioning)    Pt has GERD and asthma which could explain her chest discomfort    Pt has  chronic atrial fibrillation which could explain the palpitations.    The edema is likely, at least in part, to be a side effect of diltiazem.    Will consult Dr Capone to see if he feels pt is a candidate for TAVR    Same meds except increase the lisinopril 20 mg to bid since BP is often elevated at home    RTC one month with CBC and CMP    Follow up in about 4 weeks (around 10/3/2019).

## 2019-09-06 DIAGNOSIS — I35.0 SEVERE AORTIC STENOSIS: Primary | ICD-10-CM

## 2019-09-06 DIAGNOSIS — I48.20 ATRIAL FIBRILLATION, CHRONIC: ICD-10-CM

## 2019-09-25 ENCOUNTER — OFFICE VISIT (OUTPATIENT)
Dept: NEUROLOGY | Facility: CLINIC | Age: 74
End: 2019-09-25
Payer: MEDICARE

## 2019-09-25 VITALS
HEART RATE: 73 BPM | SYSTOLIC BLOOD PRESSURE: 146 MMHG | DIASTOLIC BLOOD PRESSURE: 85 MMHG | HEIGHT: 64 IN | BODY MASS INDEX: 39.55 KG/M2 | WEIGHT: 231.69 LBS

## 2019-09-25 DIAGNOSIS — E11.42 CONTROLLED TYPE 2 DIABETES MELLITUS WITH DIABETIC POLYNEUROPATHY, WITHOUT LONG-TERM CURRENT USE OF INSULIN: ICD-10-CM

## 2019-09-25 DIAGNOSIS — Z86.73 OLD LACUNAR STROKE WITHOUT LATE EFFECT: ICD-10-CM

## 2019-09-25 PROCEDURE — 1101F PR PT FALLS ASSESS DOC 0-1 FALLS W/OUT INJ PAST YR: ICD-10-PCS | Mod: CPTII,S$GLB,, | Performed by: PSYCHIATRY & NEUROLOGY

## 2019-09-25 PROCEDURE — 99214 PR OFFICE/OUTPT VISIT, EST, LEVL IV, 30-39 MIN: ICD-10-PCS | Mod: S$GLB,,, | Performed by: PSYCHIATRY & NEUROLOGY

## 2019-09-25 PROCEDURE — 99999 PR PBB SHADOW E&M-EST. PATIENT-LVL III: ICD-10-PCS | Mod: PBBFAC,,, | Performed by: PSYCHIATRY & NEUROLOGY

## 2019-09-25 PROCEDURE — 99999 PR PBB SHADOW E&M-EST. PATIENT-LVL III: CPT | Mod: PBBFAC,,, | Performed by: PSYCHIATRY & NEUROLOGY

## 2019-09-25 PROCEDURE — 3079F DIAST BP 80-89 MM HG: CPT | Mod: CPTII,S$GLB,, | Performed by: PSYCHIATRY & NEUROLOGY

## 2019-09-25 PROCEDURE — 99214 OFFICE O/P EST MOD 30 MIN: CPT | Mod: S$GLB,,, | Performed by: PSYCHIATRY & NEUROLOGY

## 2019-09-25 PROCEDURE — 1101F PT FALLS ASSESS-DOCD LE1/YR: CPT | Mod: CPTII,S$GLB,, | Performed by: PSYCHIATRY & NEUROLOGY

## 2019-09-25 PROCEDURE — 3079F PR MOST RECENT DIASTOLIC BLOOD PRESSURE 80-89 MM HG: ICD-10-PCS | Mod: CPTII,S$GLB,, | Performed by: PSYCHIATRY & NEUROLOGY

## 2019-09-25 PROCEDURE — 3077F PR MOST RECENT SYSTOLIC BLOOD PRESSURE >= 140 MM HG: ICD-10-PCS | Mod: CPTII,S$GLB,, | Performed by: PSYCHIATRY & NEUROLOGY

## 2019-09-25 PROCEDURE — 3077F SYST BP >= 140 MM HG: CPT | Mod: CPTII,S$GLB,, | Performed by: PSYCHIATRY & NEUROLOGY

## 2019-09-25 NOTE — PROGRESS NOTES
[facility] - NEUROLOGY EPILEPSY  [facility] [place]    Date: 9/25/19   Patient Name: Adriana Strong   MRN: [id number]   PCP: Krzysztof Mcgraw  Referring Provider: No ref. provider found    Assessment:   Adriana Strong is a 74 y.o. female follow-up for chronic polyneuropathy and  lumbar radiculopathy. Presenting in follow-up.  Will make no changes in patient's current gabapentin.  Symptoms are currently controlled.  Patient states she may be undergoing a valvular replacement surgery.  Understandably, anticoagulation will have to be held for this.  Patient understands that she will be at risk for increased stroke while this is done however this will likely be a completely necessary procedure.  Anticoagulation/antiplatelet medication should be held as briefly as is possible both pre and postoperatively.  Plan:     Problem List Items Addressed This Visit        Neuro    Old lacunar stroke without late effect    Overview     On Eliquis and plavix         Current Assessment & Plan     -- may have to be held for valve replacement, should be held as briefly as is feasible given stroke risk            Endocrine    Controlled type 2 diabetes mellitus with diabetic polyneuropathy, without long-term current use of insulin    Overview     A1C 7.0         Current Assessment & Plan     -- symptoms controlled with gabapentin             Sean Saravia MD  [facility]   Department of Neurology    Patient note was created using MModal Dictation.  Any errors in syntax or even information may not have been identified and edited on initial review prior to signing this note.  Subjective:     HPI:   Ms. Adriana Strong is a 74 y.o. female presenting in follow-up for management of chronic diabetic polyneuropathy and lumbar radicular changes.  Patient presents today with her  who contributes to the history.  They report that she is doing well.  She does have persistent lower extremity  numbness but states that gabapentin is controlling her painful paresthesias.  She states she will likely be undergoing an aortic valve replacement surgery soon.  She states that her surgeons recognize this will likely be a high risk surgery as her anticoagulation will have to be held preoperatively.    PAST MEDICAL HISTORY:  Past Medical History:   Diagnosis Date    Anticoagulant long-term use     on Plavix since May 2015    Anxiety     Arthritis     Asthma     Atrial fibrillation     Cataracts, bilateral     Chest pain, atypical     Colon polyp     Coronary artery disease     Diabetes mellitus     Dry eyes     Esophageal erosions     GERD (gastroesophageal reflux disease)     Heart murmur     Hemorrhoid     High cholesterol     Hypertension     Irritable bowel syndrome     Shingles 2015    TIA (transient ischemic attack)     Use of cane as ambulatory aid        PAST SURGICAL HISTORY:  Past Surgical History:   Procedure Laterality Date    ABDOMINAL SURGERY      angiocele      2007, 2014    BREAST SURGERY      left--- a lump--- no cancer    COLONOSCOPY  2014    COLONOSCOPY N/A 3/14/2018    Procedure: COLONOSCOPY;  Surgeon: Efren Kemp MD;  Location: [facility] ([address] [place] [address]);  Service: Endoscopy;  Laterality: N/A;  ok to hold Plavix 5 days prior to procedure per Dr RESHMA Hernandez     ok per Dr Kemp to hold Eliquis 2 days prior to procedure (see telephone encounter dated-2/7/18)    HERNIA REPAIR      HYSTERECTOMY  partial    1982 partial hysterectomy    left nasal polyp      left neck lymph node      nose polyp      right hip fatty mass tissue      stent to small intestine      SUPERIOR VENA CAVA ANGIOPLASTY / STENTING      TONSILLECTOMY      TOTAL ABDOMINAL HYSTERECTOMY      2014    TOTAL KNEE ARTHROPLASTY Bilateral     UPPER GASTROINTESTINAL ENDOSCOPY  2014       CURRENT MEDS:  Current Outpatient Medications   Medication Sig Dispense Refill    acetaminophen (TYLENOL ARTHRITIS) 650 MG TbSR  Take 1,300 mg by mouth 2 (two) times daily.      albuterol (ACCUNEB) 1.25 mg/3 mL Nebu Take scheduled q4 for the next two days while at home then resume prn dosing 3 mL 1    albuterol (VENTOLIN HFA) 90 mcg/actuation inhaler Inhale 2 puffs into the lungs every 6 (six) hours as needed for Wheezing. Rescue      apixaban (ELIQUIS) 5 mg Tab Take 1 tablet (5 mg total) by mouth 2 (two) times daily. 180 tablet 3    ascorbic acid (VITAMIN C) 500 MG tablet Take 500 mg by mouth once daily.      azelastine (ASTELIN) 137 mcg nasal spray 1 spray by Nasal route 2 (two) times daily.      bisacodyl (DULCOLAX) 5 mg EC tablet Take 5 mg by mouth daily as needed for Constipation.      budesonide-formoterol 160-4.5 mcg (SYMBICORT) 160-4.5 mcg/actuation HFAA Inhale 2 puffs into the lungs every 12 (twelve) hours. Controller      calcium carbonate (OS-MICHAEL) 600 mg (1,500 mg) Tab Take 1,200 mg by mouth once daily.      clopidogrel (PLAVIX) 75 mg tablet Take 1 tablet (75 mg total) by mouth once daily. 90 tablet 4    cyanocobalamin (VITAMIN B-12) 1000 MCG tablet Take 100 mcg by mouth once daily.      digoxin (LANOXIN) 125 mcg tablet Take 1 tablet (125 mcg total) by mouth once daily. 90 tablet 3    diltiaZEM HCl (CARDIZEM LA) 240 mg 24 hr tablet Take 1 tablet (240 mg total) by mouth once daily. 90 tablet 3    DULoxetine (CYMBALTA) 20 MG capsule Take 1 capsule (20 mg total) by mouth 2 (two) times daily. 180 capsule 3    fexofenadine (ALLEGRA) 60 MG tablet Take 60 mg by mouth every morning.      fish oil-omega-3 fatty acids 300-1,000 mg capsule Take 1 g by mouth once daily.       fluticasone (FLONASE) 50 mcg/actuation nasal spray 1 spray by Each Nare route every morning.       furosemide (LASIX) 40 MG tablet Take 1 tablet (40 mg total) by mouth once daily. 90 tablet 3    gabapentin (NEURONTIN) 300 MG capsule Take 4 capsules (1,200 mg total) by mouth 3 (three) times daily. (Patient taking differently: Take 600 mg by mouth 3  (three) times daily. ) 360 capsule 3    lisinopril (PRINIVIL,ZESTRIL) 20 MG tablet Take 1 tablet (20 mg total) by mouth 2 (two) times daily. 180 tablet 3    lorazepam (ATIVAN) 0.5 MG tablet Take 0.5 mg by mouth every morning.       magnesium 250 mg Tab Take 250 mg by mouth. 3 in am and 3 pm      metFORMIN (GLUCOPHAGE) 500 MG tablet Take 500 mg by mouth 2 (two) times daily.  1    ondansetron (ZOFRAN-ODT) 4 MG TbDL DISSOLVE 1 TABLET ON TONGUE EVERY 8 HOURS AS NEEDED FOR NAUSEA  1    oxybutynin (DITROPAN-XL) 5 MG TR24 Take 5 mg by mouth every other day.       pantoprazole (PROTONIX) 40 MG tablet Take 1 tablet (40 mg total) by mouth once daily. (Patient taking differently: Take 40 mg by mouth every morning. ) 90 tablet 3    POLYSORBATE 80/GLYCERIN (REFRESH DRY EYE THERAPY OPHT) Apply to eye 2 (two) times daily.      potassium gluconate 550 mg (90 mg) Tab Take by mouth. 2 in am and 2 pm      traMADol (ULTRAM) 50 mg tablet Take 1 tablet (50 mg total) by mouth every 6 (six) hours as needed for Pain. 20 tablet 0    zafirlukast (ACCOLATE) 20 MG tablet Take 20 mg by mouth once daily.  3     No current facility-administered medications for this visit.        ALLERGIES:  Review of patient's allergies indicates:   Allergen Reactions    Celebrex [celecoxib] Shortness Of Breath    Ciprofloxacin Swelling     lip    Dicyclomine Hives    Adhesive Dermatitis    Avelox [moxifloxacin] Swelling    Cilostazol Other (See Comments)     Elevates blood pressure    Eryc [erythromycin] Other (See Comments)     unknown    Iodine and iodide containing products Hives    Keflex [cephalexin] Hives    Meclomen      rashes    Meloxicam      Ear ringing    Metoclopramide Other (See Comments)     High blood pressure    Sulfa (sulfonamide antibiotics) Itching       FAMILY HISTORY:  Family History   Problem Relation Age of Onset    Colon cancer Neg Hx     Inflammatory bowel disease Neg Hx        SOCIAL HISTORY:  Social History  "    Tobacco Use    Smoking status: Never Smoker    Smokeless tobacco: Never Used   Substance Use Topics    Alcohol use: No    Drug use: No       Review of Systems:  12 system review of systems is negative except for the symptoms mentioned in HPI.      Objective:     Vitals:    09/25/19 0817   BP: (!) 146/85   Pulse: 73   Weight: 105.1 kg (231 lb 11.3 oz)   Height: 5' 4" (1.626 m)     General: NAD, well nourished   Eyes: no tearing, discharge, no erythema   ENT: moist mucous membranes of the oral cavity, nares patent    Neck: Supple, full range of motion  Cardiovascular: Warm and well perfused, pulses equal and symmetrical  Lungs: Normal work of breathing, normal chest wall excursions  Skin: No rash, lesions, or breakdown on exposed skin  Psychiatry: Mood and affect are appropriate   Abdomen: soft, non tender, non distended  Extremeties: No cyanosis, clubbing or edema.    Neurological   MENTAL STATUS: Alert and oriented to person, place, and time. Attention and concentration within normal limits. Speech without dysarthria. Recent and remote memory within normal limits   CRANIAL NERVES: Visual fields intact. PERRL. EOMI. Facial sensation intact. Face symmetrical. Hearing grossly intact. Full shoulder shrug bilaterally. Tongue protrudes midline   SENSORY: Sensation is reduced to LT, ST, temp, and vibration to knees  Joint position perception reduced bilaterally. Positive Romberg.   MOTOR: Normal bulk and tone. 5/5 deltoid, biceps, triceps, interosseous, hand  bilaterally. 4/5 iliopsoas, knee extension/flexion, foot dorsi/plantarflexion bilaterally.    REFLEXES: Symmetric and 1+ throughout.   CEREBELLAR/COORDINATION/GAIT: Gait wide based and unsteady. Finger to nose intact. Normal rapid alternating movements.         "

## 2019-09-25 NOTE — ASSESSMENT & PLAN NOTE
-- may have to be held for valve replacement, should be held as briefly as is feasible given stroke risk

## 2019-10-15 ENCOUNTER — OFFICE VISIT (OUTPATIENT)
Dept: CARDIOLOGY | Facility: CLINIC | Age: 74
End: 2019-10-15
Payer: MEDICARE

## 2019-10-15 VITALS
OXYGEN SATURATION: 97 % | DIASTOLIC BLOOD PRESSURE: 75 MMHG | SYSTOLIC BLOOD PRESSURE: 129 MMHG | HEART RATE: 86 BPM | BODY MASS INDEX: 39.62 KG/M2 | WEIGHT: 232.06 LBS | HEIGHT: 64 IN

## 2019-10-15 DIAGNOSIS — J45.20 MILD INTERMITTENT ASTHMA WITHOUT COMPLICATION: ICD-10-CM

## 2019-10-15 DIAGNOSIS — Z86.73 OLD LACUNAR STROKE WITHOUT LATE EFFECT: ICD-10-CM

## 2019-10-15 DIAGNOSIS — E78.00 PURE HYPERCHOLESTEROLEMIA: ICD-10-CM

## 2019-10-15 DIAGNOSIS — J41.0 SIMPLE CHRONIC BRONCHITIS: ICD-10-CM

## 2019-10-15 DIAGNOSIS — K55.1 CHRONIC MESENTERIC ISCHEMIA: ICD-10-CM

## 2019-10-15 DIAGNOSIS — M48.061 NEUROFORAMINAL STENOSIS OF LUMBAR SPINE: ICD-10-CM

## 2019-10-15 DIAGNOSIS — E11.42 CONTROLLED TYPE 2 DIABETES MELLITUS WITH DIABETIC POLYNEUROPATHY, WITHOUT LONG-TERM CURRENT USE OF INSULIN: ICD-10-CM

## 2019-10-15 DIAGNOSIS — E11.42 DIABETIC POLYNEUROPATHY ASSOCIATED WITH TYPE 2 DIABETES MELLITUS: ICD-10-CM

## 2019-10-15 DIAGNOSIS — M48.062 SPINAL STENOSIS OF LUMBAR REGION WITH NEUROGENIC CLAUDICATION: ICD-10-CM

## 2019-10-15 DIAGNOSIS — I35.0 SEVERE AORTIC STENOSIS: ICD-10-CM

## 2019-10-15 DIAGNOSIS — I10 ESSENTIAL HYPERTENSION: ICD-10-CM

## 2019-10-15 DIAGNOSIS — I48.20 CHRONIC ATRIAL FIBRILLATION: Primary | ICD-10-CM

## 2019-10-15 PROCEDURE — 3074F PR MOST RECENT SYSTOLIC BLOOD PRESSURE < 130 MM HG: ICD-10-PCS | Mod: CPTII,S$GLB,, | Performed by: INTERNAL MEDICINE

## 2019-10-15 PROCEDURE — 99214 PR OFFICE/OUTPT VISIT, EST, LEVL IV, 30-39 MIN: ICD-10-PCS | Mod: S$GLB,,, | Performed by: INTERNAL MEDICINE

## 2019-10-15 PROCEDURE — 99999 PR PBB SHADOW E&M-EST. PATIENT-LVL III: CPT | Mod: PBBFAC,,, | Performed by: INTERNAL MEDICINE

## 2019-10-15 PROCEDURE — 3078F PR MOST RECENT DIASTOLIC BLOOD PRESSURE < 80 MM HG: ICD-10-PCS | Mod: CPTII,S$GLB,, | Performed by: INTERNAL MEDICINE

## 2019-10-15 PROCEDURE — 99999 PR PBB SHADOW E&M-EST. PATIENT-LVL III: ICD-10-PCS | Mod: PBBFAC,,, | Performed by: INTERNAL MEDICINE

## 2019-10-15 PROCEDURE — 99214 OFFICE O/P EST MOD 30 MIN: CPT | Mod: S$GLB,,, | Performed by: INTERNAL MEDICINE

## 2019-10-15 PROCEDURE — 3078F DIAST BP <80 MM HG: CPT | Mod: CPTII,S$GLB,, | Performed by: INTERNAL MEDICINE

## 2019-10-15 PROCEDURE — 1101F PT FALLS ASSESS-DOCD LE1/YR: CPT | Mod: CPTII,S$GLB,, | Performed by: INTERNAL MEDICINE

## 2019-10-15 PROCEDURE — 3074F SYST BP LT 130 MM HG: CPT | Mod: CPTII,S$GLB,, | Performed by: INTERNAL MEDICINE

## 2019-10-15 PROCEDURE — 1101F PR PT FALLS ASSESS DOC 0-1 FALLS W/OUT INJ PAST YR: ICD-10-PCS | Mod: CPTII,S$GLB,, | Performed by: INTERNAL MEDICINE

## 2019-10-15 NOTE — PROGRESS NOTES
Subjective:      Patient ID: Adriana Strong is a 74 y.o. female.    Chief Complaint: Follow-up    HPI:  Pt saw Dr Go earlier today.    Pt has appt to see Dr Capone    Palpitations and hypertension are improved with higher dose of dilitazem    Pt still has shortness of breath when lying down.    Pt has tightness in the chest  And shortness of breath when first walking.    Feet still swell.    Pt feel off balance at times.    BP at home is in a good range per pt.    Review of Systems   Cardiovascular: Positive for chest pain, dyspnea on exertion, leg swelling and palpitations. Negative for claudication, irregular heartbeat, near-syncope, orthopnea and syncope.      No recent abdominal pain    Past Medical History:   Diagnosis Date    Anticoagulant long-term use     on Plavix since May 2015    Anxiety     Arthritis     Asthma     Atrial fibrillation     Cataracts, bilateral     Chest pain, atypical     Colon polyp     Coronary artery disease     Diabetes mellitus     Dry eyes     Esophageal erosions     GERD (gastroesophageal reflux disease)     Heart murmur     Hemorrhoid     High cholesterol     Hypertension     Irritable bowel syndrome     Shingles 2015    TIA (transient ischemic attack)     Use of cane as ambulatory aid         Past Surgical History:   Procedure Laterality Date    ABDOMINAL SURGERY      angiocele      2007, 2014    BREAST SURGERY      left--- a lump--- no cancer    COLONOSCOPY  2014    COLONOSCOPY N/A 3/14/2018    Procedure: COLONOSCOPY;  Surgeon: Efren Kemp MD;  Location: [facility] ([address] [place] [address]);  Service: Endoscopy;  Laterality: N/A;  ok to hold Plavix 5 days prior to procedure per Dr RESHMA Hernandez     ok per Dr Kemp to hold Eliquis 2 days prior to procedure (see telephone encounter dated-2/7/18)    HERNIA REPAIR      HYSTERECTOMY  partial    1982 partial hysterectomy    left nasal polyp      left neck lymph node      nose polyp      right hip fatty mass tissue       stent to small intestine      SUPERIOR VENA CAVA ANGIOPLASTY / STENTING      TONSILLECTOMY      TOTAL ABDOMINAL HYSTERECTOMY      2014    TOTAL KNEE ARTHROPLASTY Bilateral     UPPER GASTROINTESTINAL ENDOSCOPY  2014       Family History   Problem Relation Age of Onset    Colon cancer Neg Hx     Inflammatory bowel disease Neg Hx        Social History     Socioeconomic History    Marital status:      Spouse name: Not on file    Number of children: Not on file    Years of education: Not on file    Highest education level: Not on file   Occupational History    Not on file   Social Needs    Financial resource strain: Not on file    Food insecurity:     Worry: Not on file     Inability: Not on file    Transportation needs:     Medical: Not on file     Non-medical: Not on file   Tobacco Use    Smoking status: Never Smoker    Smokeless tobacco: Never Used   Substance and Sexual Activity    Alcohol use: No    Drug use: No    Sexual activity: Not on file   Lifestyle    Physical activity:     Days per week: Not on file     Minutes per session: Not on file    Stress: Not on file   Relationships    Social connections:     Talks on phone: Not on file     Gets together: Not on file     Attends [employer] service: Not on file     Active member of club or organization: Not on file     Attends meetings of clubs or organizations: Not on file     Relationship status: Not on file   Other Topics Concern    Not on file   Social History Narrative    Not on file       Current Outpatient Medications on File Prior to Visit   Medication Sig Dispense Refill    acetaminophen (TYLENOL ARTHRITIS) 650 MG TbSR Take 1,300 mg by mouth 2 (two) times daily.      albuterol (ACCUNEB) 1.25 mg/3 mL Nebu Take scheduled q4 for the next two days while at home then resume prn dosing 3 mL 1    albuterol (VENTOLIN HFA) 90 mcg/actuation inhaler Inhale 2 puffs into the lungs every 6 (six) hours as needed for Wheezing. Rescue       apixaban (ELIQUIS) 5 mg Tab Take 1 tablet (5 mg total) by mouth 2 (two) times daily. 180 tablet 3    ascorbic acid (VITAMIN C) 500 MG tablet Take 500 mg by mouth once daily.      azelastine (ASTELIN) 137 mcg nasal spray 1 spray by Nasal route 2 (two) times daily.      bisacodyl (DULCOLAX) 5 mg EC tablet Take 5 mg by mouth daily as needed for Constipation.      budesonide-formoterol 160-4.5 mcg (SYMBICORT) 160-4.5 mcg/actuation HFAA Inhale 2 puffs into the lungs every 12 (twelve) hours. Controller      calcium carbonate (OS-MICHAEL) 600 mg (1,500 mg) Tab Take 1,200 mg by mouth once daily.      clopidogrel (PLAVIX) 75 mg tablet Take 1 tablet (75 mg total) by mouth once daily. 90 tablet 4    cyanocobalamin (VITAMIN B-12) 1000 MCG tablet Take 100 mcg by mouth once daily.      digoxin (LANOXIN) 125 mcg tablet Take 1 tablet (125 mcg total) by mouth once daily. 90 tablet 3    diltiaZEM HCl (CARDIZEM LA) 240 mg 24 hr tablet Take 1 tablet (240 mg total) by mouth once daily. 90 tablet 3    DULoxetine (CYMBALTA) 20 MG capsule Take 1 capsule (20 mg total) by mouth 2 (two) times daily. 180 capsule 3    fexofenadine (ALLEGRA) 60 MG tablet Take 60 mg by mouth every morning.      fish oil-omega-3 fatty acids 300-1,000 mg capsule Take 1 g by mouth once daily.       fluticasone (FLONASE) 50 mcg/actuation nasal spray 1 spray by Each Nare route every morning.       furosemide (LASIX) 40 MG tablet Take 1 tablet (40 mg total) by mouth once daily. 90 tablet 3    gabapentin (NEURONTIN) 300 MG capsule Take 4 capsules (1,200 mg total) by mouth 3 (three) times daily. (Patient taking differently: Take 600 mg by mouth 3 (three) times daily. ) 360 capsule 3    lisinopril (PRINIVIL,ZESTRIL) 20 MG tablet Take 1 tablet (20 mg total) by mouth 2 (two) times daily. 180 tablet 3    lorazepam (ATIVAN) 0.5 MG tablet Take 0.5 mg by mouth every morning.       magnesium 250 mg Tab Take 250 mg by mouth. 3 in am and 3 pm      metFORMIN  "(GLUCOPHAGE) 500 MG tablet Take 500 mg by mouth 2 (two) times daily.  1    ondansetron (ZOFRAN-ODT) 4 MG TbDL DISSOLVE 1 TABLET ON TONGUE EVERY 8 HOURS AS NEEDED FOR NAUSEA  1    oxybutynin (DITROPAN-XL) 5 MG TR24 Take 5 mg by mouth every other day.       pantoprazole (PROTONIX) 40 MG tablet Take 1 tablet (40 mg total) by mouth once daily. (Patient taking differently: Take 40 mg by mouth every morning. ) 90 tablet 3    POLYSORBATE 80/GLYCERIN (REFRESH DRY EYE THERAPY OPHT) Apply to eye 2 (two) times daily.      potassium gluconate 550 mg (90 mg) Tab Take by mouth. 2 in am and 2 pm      traMADol (ULTRAM) 50 mg tablet Take 1 tablet (50 mg total) by mouth every 6 (six) hours as needed for Pain. 20 tablet 0    zafirlukast (ACCOLATE) 20 MG tablet Take 20 mg by mouth once daily.  3     No current facility-administered medications on file prior to visit.        Review of patient's allergies indicates:   Allergen Reactions    Celebrex [celecoxib] Shortness Of Breath    Ciprofloxacin Swelling     lip    Dicyclomine Hives    Adhesive Dermatitis    Avelox [moxifloxacin] Swelling    Cilostazol Other (See Comments)     Elevates blood pressure    Eryc [erythromycin] Other (See Comments)     unknown    Iodine and iodide containing products Hives    Keflex [cephalexin] Hives    Meclomen      rashes    Meloxicam      Ear ringing    Metoclopramide Other (See Comments)     High blood pressure    Sulfa (sulfonamide antibiotics) Itching     Objective:     Vitals:    10/15/19 1454   BP: 129/75   BP Location: Left arm   Patient Position: Sitting   BP Method: Large (Automatic)   Pulse: 86   SpO2: 97%   Weight: 105.3 kg (232 lb 0.6 oz)   Height: 5' 4" (1.626 m)        Physical Exam   Constitutional: She is oriented to person, place, and time. She appears well-developed and well-nourished.   Eyes: No scleral icterus.   Neck: No JVD present. Carotid bruit is not present.   Cardiovascular: Normal rate. An irregularly " irregular rhythm present. Exam reveals no gallop.   Murmur (III/VI systolic murmur radiates to carotids) heard.  Pulmonary/Chest: Breath sounds normal.   Musculoskeletal: She exhibits edema (trace pitting pedal edema bilaterally).   Neurological: She is alert and oriented to person, place, and time.   Skin: Skin is warm and dry.   Psychiatric: She has a normal mood and affect. Her behavior is normal. Judgment and thought content normal.   Vitals reviewed.     Glucose 122  CMP WNL except AST 45 and ALT 31  Hgb1C 6.8    Note dobutamine stress echo in 2018 showed normal LVEF and no stress ischemia    Note carotid ultrasound in 2014 and in 2018 showed no significant carotid stenosis    Assessment:     1. Chronic atrial fibrillation    2. Essential hypertension    3. Old lacunar stroke without late effect    4. Severe aortic stenosis    5. Pure hypercholesterolemia    6. Mild intermittent asthma without complication    7. Simple chronic bronchitis    8. Spinal stenosis of lumbar region with neurogenic claudication    9. Diabetic polyneuropathy associated with type 2 diabetes mellitus    10. Controlled type 2 diabetes mellitus with diabetic polyneuropathy, without long-term current use of insulin    11. Chronic mesenteric ischemia    12. Neuroforaminal stenosis of lumbar spine      Plan:   Adriana was seen today for follow-up.    Diagnoses and all orders for this visit:    Chronic atrial fibrillation    Essential hypertension    Old lacunar stroke without late effect    Severe aortic stenosis    Pure hypercholesterolemia    Mild intermittent asthma without complication    Simple chronic bronchitis    Spinal stenosis of lumbar region with neurogenic claudication    Diabetic polyneuropathy associated with type 2 diabetes mellitus    Controlled type 2 diabetes mellitus with diabetic polyneuropathy, without long-term current use of insulin    Chronic mesenteric ischemia    Neuroforaminal stenosis of lumbar spine     ADA diet  reinforced at length and in detail    Pt has appt to see Dr Capone regarding possibly symptomatic aortic stenosis. (although velocities do not indicate critical AS) given symptoms of chest pain and shortness of breath and edema    Pt's asthma and obesity and deconditioning contribute to the shortness of breath    Diltiazem contributes to the edema    Pt's GERD accounts for some of the chest discomfort    Chronic a fib accounts for the palpitations    Follow up in about 3 months (around 1/15/2020).

## 2019-10-22 ENCOUNTER — HOSPITAL ENCOUNTER (OUTPATIENT)
Dept: RADIOLOGY | Facility: HOSPITAL | Age: 74
Discharge: HOME | End: 2019-10-22
Attending: INTERNAL MEDICINE
Payer: MEDICARE

## 2019-10-22 ENCOUNTER — DOCUMENTATION ONLY (OUTPATIENT)
Dept: CARDIOLOGY | Facility: CLINIC | Age: 74
End: 2019-10-22

## 2019-10-22 ENCOUNTER — HOSPITAL ENCOUNTER (OUTPATIENT)
Dept: CARDIOLOGY | Facility: CLINIC | Age: 74
Discharge: HOME | End: 2019-10-22
Attending: INTERNAL MEDICINE
Payer: MEDICARE

## 2019-10-22 VITALS — WEIGHT: 232 LBS | HEART RATE: 75 BPM | BODY MASS INDEX: 39.61 KG/M2 | HEIGHT: 64 IN

## 2019-10-22 DIAGNOSIS — I35.0 SEVERE AORTIC STENOSIS: Primary | ICD-10-CM

## 2019-10-22 DIAGNOSIS — I35.0 SEVERE AORTIC STENOSIS: ICD-10-CM

## 2019-10-22 DIAGNOSIS — I48.20 ATRIAL FIBRILLATION, CHRONIC: ICD-10-CM

## 2019-10-22 LAB
ASCENDING AORTA: 3.04 CM
AV INDEX (PROSTH): 0.21
AV MEAN GRADIENT: 42 MMHG
AV PEAK GRADIENT: 67 MMHG
AV VALVE AREA: 0.8 CM2
AV VELOCITY RATIO: 0.19
BSA FOR ECHO PROCEDURE: 2.18 M2
CV ECHO LV RWT: 0.49 CM
DOP CALC AO PEAK VEL: 4.1 M/S
DOP CALC AO VTI: 90 CM
DOP CALC LVOT AREA: 3.8 CM2
DOP CALC LVOT DIAMETER: 2.2 CM
DOP CALC LVOT PEAK VEL: 0.79 M/S
DOP CALC LVOT STROKE VOLUME: 72.19 CM3
DOP CALCLVOT PEAK VEL VTI: 19 CM
E/E' RATIO: 13.33 M/S
ECHO LV POSTERIOR WALL: 1 CM (ref 0.6–1.1)
FRACTIONAL SHORTENING: 27 % (ref 28–44)
INTERVENTRICULAR SEPTUM: 1.06 CM (ref 0.6–1.1)
IVRT: 0.05 MSEC
LA MAJOR: 5.98 CM
LA MINOR: 6.03 CM
LA WIDTH: 4.05 CM
LEFT ATRIUM SIZE: 4.55 CM
LEFT ATRIUM VOLUME INDEX: 45.1 ML/M2
LEFT ATRIUM VOLUME: 94.06 CM3
LEFT INTERNAL DIMENSION IN SYSTOLE: 2.97 CM (ref 2.1–4)
LEFT VENTRICLE DIASTOLIC VOLUME INDEX: 35.48 ML/M2
LEFT VENTRICLE DIASTOLIC VOLUME: 73.91 ML
LEFT VENTRICLE MASS INDEX: 66 G/M2
LEFT VENTRICLE SYSTOLIC VOLUME INDEX: 16.4 ML/M2
LEFT VENTRICLE SYSTOLIC VOLUME: 34.09 ML
LEFT VENTRICULAR INTERNAL DIMENSION IN DIASTOLE: 4.09 CM (ref 3.5–6)
LEFT VENTRICULAR MASS: 137.21 G
LV LATERAL E/E' RATIO: 13.33 M/S
LV SEPTAL E/E' RATIO: 13.33 M/S
MV PEAK E VEL: 1.2 M/S
PISA TR MAX VEL: 2.99 M/S
RA MAJOR: 5.22 CM
RA PRESSURE: 3 MMHG
RA WIDTH: 3.81 CM
RETIRED EF AND QEF - SEE NOTES: 62 %
RIGHT VENTRICULAR END-DIASTOLIC DIMENSION: 2.78 CM
RV TISSUE DOPPLER FREE WALL SYSTOLIC VELOCITY 1 (APICAL 4 CHAMBER VIEW): 12.33 CM/S
SINUS: 2.64 CM
STJ: 2.86 CM
TDI LATERAL: 0.09 M/S
TDI SEPTAL: 0.09 M/S
TDI: 0.09 M/S
TR MAX PG: 36 MMHG
TRICUSPID ANNULAR PLANE SYSTOLIC EXCURSION: 2.11 CM
TV REST PULMONARY ARTERY PRESSURE: 39 MMHG

## 2019-10-22 PROCEDURE — 93306 ECHO (CUPID ONLY): ICD-10-PCS | Mod: S$GLB,,, | Performed by: INTERNAL MEDICINE

## 2019-10-22 PROCEDURE — 93306 TTE W/DOPPLER COMPLETE: CPT | Mod: S$GLB,,, | Performed by: INTERNAL MEDICINE

## 2019-10-22 NOTE — PROGRESS NOTES
CT scan cancelled today because of lack of contrast premedication.  Rx given and rescheduled for Monday 8AM.  Echo today qualifies.

## 2019-10-24 RX ORDER — GABAPENTIN 300 MG/1
600 CAPSULE ORAL 3 TIMES DAILY
Qty: 540 CAPSULE | Refills: 3 | Status: SHIPPED | OUTPATIENT
Start: 2019-10-24 | End: 2020-10-25

## 2019-10-24 NOTE — TELEPHONE ENCOUNTER
----- Message from Leti Yeh sent at 10/24/2019  1:11 PM CDT -----  Contact: self  Rx Refill/Request     Is this a Refill or New Rx:  Refill    Rx Name and Strength:  gabapentin (NEURONTIN) 300 MG capsule    Preferred Pharmacy with phone number: [facility]/pharmacy #[id number] [phone] (Phone)  [phone] (Fax)    Communication Preference: [phone]    Additional Information: Pt is requesting a 90 day supply w/ refills

## 2019-10-28 ENCOUNTER — HOSPITAL ENCOUNTER (OUTPATIENT)
Dept: PULMONOLOGY | Facility: CLINIC | Age: 74
Discharge: HOME | End: 2019-10-28
Payer: MEDICARE

## 2019-10-28 ENCOUNTER — EDUCATION (OUTPATIENT)
Dept: CARDIOLOGY | Facility: CLINIC | Age: 74
End: 2019-10-28

## 2019-10-28 ENCOUNTER — OFFICE VISIT (OUTPATIENT)
Dept: CARDIOLOGY | Facility: CLINIC | Age: 74
End: 2019-10-28
Payer: MEDICARE

## 2019-10-28 ENCOUNTER — HOSPITAL ENCOUNTER (OUTPATIENT)
Dept: RADIOLOGY | Facility: HOSPITAL | Age: 74
Discharge: HOME | End: 2019-10-28
Attending: INTERNAL MEDICINE
Payer: MEDICARE

## 2019-10-28 VITALS
HEIGHT: 64 IN | SYSTOLIC BLOOD PRESSURE: 155 MMHG | OXYGEN SATURATION: 97 % | BODY MASS INDEX: 38.84 KG/M2 | WEIGHT: 227.5 LBS | HEART RATE: 89 BPM | DIASTOLIC BLOOD PRESSURE: 75 MMHG

## 2019-10-28 VITALS — WEIGHT: 229.25 LBS | BODY MASS INDEX: 40.62 KG/M2 | HEIGHT: 63 IN

## 2019-10-28 DIAGNOSIS — K55.1 MESENTERIC ARTERY INSUFFICIENCY: ICD-10-CM

## 2019-10-28 DIAGNOSIS — I35.0 SEVERE AORTIC STENOSIS: ICD-10-CM

## 2019-10-28 DIAGNOSIS — I48.20 ATRIAL FIBRILLATION, CHRONIC: ICD-10-CM

## 2019-10-28 DIAGNOSIS — R26.2 DIFFICULTY WALKING: ICD-10-CM

## 2019-10-28 DIAGNOSIS — Z86.73 OLD LACUNAR STROKE WITHOUT LATE EFFECT: ICD-10-CM

## 2019-10-28 DIAGNOSIS — E78.00 PURE HYPERCHOLESTEROLEMIA: ICD-10-CM

## 2019-10-28 DIAGNOSIS — J45.20 MILD INTERMITTENT ASTHMA WITHOUT COMPLICATION: ICD-10-CM

## 2019-10-28 DIAGNOSIS — E11.42 DIABETIC POLYNEUROPATHY ASSOCIATED WITH TYPE 2 DIABETES MELLITUS: ICD-10-CM

## 2019-10-28 DIAGNOSIS — I35.0 SEVERE AORTIC STENOSIS: Primary | ICD-10-CM

## 2019-10-28 DIAGNOSIS — I10 ESSENTIAL HYPERTENSION: ICD-10-CM

## 2019-10-28 LAB
DLCO ADJ PRE: 15.72 ML/(MIN*MMHG) (ref 14.05–25.51)
DLCO SINGLE BREATH LLN: 14.05
DLCO SINGLE BREATH PRE REF: 80.9 %
DLCO SINGLE BREATH REF: 19.78
DLCOC SBVA LLN: 2.67
DLCOC SBVA PRE REF: 122.3 %
DLCOC SBVA REF: 4.15
DLCOC SINGLE BREATH LLN: 14.05
DLCOC SINGLE BREATH PRE REF: 79.5 %
DLCOC SINGLE BREATH REF: 19.78
DLCOCSBVAULN: 5.63
DLCOCSINGLEBREATHULN: 25.51
DLCOSINGLEBREATHULN: 25.51
DLCOVA LLN: 2.67
DLCOVA PRE REF: 124.5 %
DLCOVA PRE: 5.16 ML/(MIN*MMHG*L) (ref 2.67–5.63)
DLCOVA REF: 4.15
DLCOVAULN: 5.63
DLVAADJ PRE: 5.07 ML/(MIN*MMHG*L) (ref 2.67–5.63)
FEF 25 75 LLN: 0.69
FEF 25 75 PRE REF: 63.2 %
FEF 25 75 REF: 1.61
FEV05 LLN: 0.71
FEV05 REF: 1.57
FEV1 FVC LLN: 63
FEV1 FVC PRE REF: 97.9 %
FEV1 FVC REF: 77
FEV1 LLN: 1.38
FEV1 PRE REF: 62.3 %
FEV1 REF: 1.95
FVC LLN: 1.81
FVC PRE REF: 63 %
FVC REF: 2.54
IVC PRE: 1.52 L (ref 1.81–3.28)
IVC SINGLE BREATH LLN: 1.81
IVC SINGLE BREATH PRE REF: 59.8 %
IVC SINGLE BREATH REF: 2.54
IVCSINGLEBREATHULN: 3.28
MVV LLN: 62
MVV PRE REF: 48.1 %
MVV REF: 73
PEF LLN: 3.35
PEF PRE REF: 92.7 %
PEF REF: 5.01
PRE DLCO: 16 ML/(MIN*MMHG) (ref 14.05–25.51)
PRE FEF 25 75: 1.02 L/S (ref 0.69–2.53)
PRE FET 100: 7.06 SEC
PRE FEV05 REF: 67.8 %
PRE FEV1 FVC: 75.84 % (ref 63.24–91.7)
PRE FEV1: 1.22 L (ref 1.38–2.52)
PRE FEV5: 1.06 L (ref 0.71–2.43)
PRE FVC: 1.6 L (ref 1.81–3.28)
PRE MVV: 35 L/MIN (ref 61.88–83.72)
PRE PEF: 4.64 L/S (ref 3.35–6.66)
VA PRE: 3.1 L (ref 4.62–4.62)
VA SINGLE BREATH LLN: 4.62
VA SINGLE BREATH PRE REF: 67.1 %
VA SINGLE BREATH REF: 4.62
VASINGLEBREATHULN: 4.62

## 2019-10-28 PROCEDURE — 99999 PR PBB SHADOW E&M-EST. PATIENT-LVL V: ICD-10-PCS | Mod: PBBFAC,,,

## 2019-10-28 PROCEDURE — 1101F PT FALLS ASSESS-DOCD LE1/YR: CPT | Mod: CPTII,S$GLB,, | Performed by: INTERNAL MEDICINE

## 2019-10-28 PROCEDURE — 71275 CT ANGIOGRAPHY CHEST: CPT | Mod: TC

## 2019-10-28 PROCEDURE — 94010 BREATHING CAPACITY TEST: CPT | Mod: S$GLB,,, | Performed by: INTERNAL MEDICINE

## 2019-10-28 PROCEDURE — 3078F PR MOST RECENT DIASTOLIC BLOOD PRESSURE < 80 MM HG: ICD-10-PCS | Mod: CPTII,S$GLB,, | Performed by: INTERNAL MEDICINE

## 2019-10-28 PROCEDURE — 3078F DIAST BP <80 MM HG: CPT | Mod: CPTII,S$GLB,, | Performed by: INTERNAL MEDICINE

## 2019-10-28 PROCEDURE — 94010 BREATHING CAPACITY TEST: ICD-10-PCS | Mod: S$GLB,,, | Performed by: INTERNAL MEDICINE

## 2019-10-28 PROCEDURE — 3077F SYST BP >= 140 MM HG: CPT | Mod: CPTII,S$GLB,, | Performed by: THORACIC SURGERY (CARDIOTHORACIC VASCULAR SURGERY)

## 2019-10-28 PROCEDURE — 3078F PR MOST RECENT DIASTOLIC BLOOD PRESSURE < 80 MM HG: ICD-10-PCS | Mod: CPTII,S$GLB,, | Performed by: THORACIC SURGERY (CARDIOTHORACIC VASCULAR SURGERY)

## 2019-10-28 PROCEDURE — 99204 OFFICE O/P NEW MOD 45 MIN: CPT | Mod: S$GLB,,, | Performed by: INTERNAL MEDICINE

## 2019-10-28 PROCEDURE — 3077F PR MOST RECENT SYSTOLIC BLOOD PRESSURE >= 140 MM HG: ICD-10-PCS | Mod: CPTII,S$GLB,, | Performed by: INTERNAL MEDICINE

## 2019-10-28 PROCEDURE — 99204 PR OFFICE/OUTPT VISIT, NEW, LEVL IV, 45-59 MIN: ICD-10-PCS | Mod: S$GLB,,, | Performed by: INTERNAL MEDICINE

## 2019-10-28 PROCEDURE — 1101F PR PT FALLS ASSESS DOC 0-1 FALLS W/OUT INJ PAST YR: ICD-10-PCS | Mod: CPTII,S$GLB,, | Performed by: INTERNAL MEDICINE

## 2019-10-28 PROCEDURE — 99205 OFFICE O/P NEW HI 60 MIN: CPT | Mod: S$GLB,,, | Performed by: THORACIC SURGERY (CARDIOTHORACIC VASCULAR SURGERY)

## 2019-10-28 PROCEDURE — 94729 PR C02/MEMBANE DIFFUSE CAPACITY: ICD-10-PCS | Mod: S$GLB,,, | Performed by: INTERNAL MEDICINE

## 2019-10-28 PROCEDURE — 74174 CTA CARDIAC TAVR_PARTNERS (XPD): ICD-10-PCS | Mod: 26,,, | Performed by: INTERNAL MEDICINE

## 2019-10-28 PROCEDURE — 71275 CTA CARDIAC TAVR_PARTNERS (XPD): ICD-10-PCS | Mod: 26,,, | Performed by: INTERNAL MEDICINE

## 2019-10-28 PROCEDURE — 71275 CT ANGIOGRAPHY CHEST: CPT | Mod: 26,,, | Performed by: INTERNAL MEDICINE

## 2019-10-28 PROCEDURE — 99205 PR OFFICE/OUTPT VISIT, NEW, LEVL V, 60-74 MIN: ICD-10-PCS | Mod: S$GLB,,, | Performed by: THORACIC SURGERY (CARDIOTHORACIC VASCULAR SURGERY)

## 2019-10-28 PROCEDURE — 1101F PT FALLS ASSESS-DOCD LE1/YR: CPT | Mod: CPTII,S$GLB,, | Performed by: THORACIC SURGERY (CARDIOTHORACIC VASCULAR SURGERY)

## 2019-10-28 PROCEDURE — 25500020 PHARM REV CODE 255: Performed by: INTERNAL MEDICINE

## 2019-10-28 PROCEDURE — 3077F PR MOST RECENT SYSTOLIC BLOOD PRESSURE >= 140 MM HG: ICD-10-PCS | Mod: CPTII,S$GLB,, | Performed by: THORACIC SURGERY (CARDIOTHORACIC VASCULAR SURGERY)

## 2019-10-28 PROCEDURE — 1101F PR PT FALLS ASSESS DOC 0-1 FALLS W/OUT INJ PAST YR: ICD-10-PCS | Mod: CPTII,S$GLB,, | Performed by: THORACIC SURGERY (CARDIOTHORACIC VASCULAR SURGERY)

## 2019-10-28 PROCEDURE — 94618 PULMONARY STRESS TESTING: CPT | Mod: S$GLB,,, | Performed by: INTERNAL MEDICINE

## 2019-10-28 PROCEDURE — 3078F DIAST BP <80 MM HG: CPT | Mod: CPTII,S$GLB,, | Performed by: THORACIC SURGERY (CARDIOTHORACIC VASCULAR SURGERY)

## 2019-10-28 PROCEDURE — 74174 CTA ABD&PLVS W/CONTRAST: CPT | Mod: 26,,, | Performed by: INTERNAL MEDICINE

## 2019-10-28 PROCEDURE — 94729 DIFFUSING CAPACITY: CPT | Mod: S$GLB,,, | Performed by: INTERNAL MEDICINE

## 2019-10-28 PROCEDURE — 94618 PULMONARY STRESS TESTING: ICD-10-PCS | Mod: S$GLB,,, | Performed by: INTERNAL MEDICINE

## 2019-10-28 PROCEDURE — 3077F SYST BP >= 140 MM HG: CPT | Mod: CPTII,S$GLB,, | Performed by: INTERNAL MEDICINE

## 2019-10-28 PROCEDURE — 99999 PR PBB SHADOW E&M-EST. PATIENT-LVL V: CPT | Mod: PBBFAC,,,

## 2019-10-28 RX ORDER — FLUCONAZOLE 100 MG/1
150 TABLET ORAL DAILY
Qty: 6 TABLET | Refills: 0 | Status: SHIPPED | OUTPATIENT
Start: 2019-10-28 | End: 2019-11-01

## 2019-10-28 RX ORDER — NYSTATIN 100000 [USP'U]/G
POWDER TOPICAL 4 TIMES DAILY
Qty: 1 BOTTLE | Refills: 0 | Status: ON HOLD | OUTPATIENT
Start: 2019-10-28 | End: 2020-10-21 | Stop reason: HOSPADM

## 2019-10-28 RX ORDER — SODIUM CHLORIDE 9 MG/ML
INJECTION, SOLUTION INTRAVENOUS CONTINUOUS
Status: CANCELLED | OUTPATIENT
Start: 2019-10-28

## 2019-10-28 RX ADMIN — IOHEXOL 100 ML: 350 INJECTION, SOLUTION INTRAVENOUS at 08:10

## 2019-10-28 NOTE — PROGRESS NOTES
Cardiothoracic Surgery  Transcatheter Aortic Valve Replacement Evaluation      SUBJECTIVE:     History of Present Illness:  Adriana Strong is a 74 y.o. y.o. female who presents for evaluation and treatment of severe symptomatic AS, evaluate for TAVR.               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              - Fam Hx: mother with MI, father with CVA, brother x2 with CAD.     Ms. Strong is a 74 yoF, here with spouse and daughter, followed by Dr. Carrington. Recently having progressively worsening WHITLEY, palpitations, LE edema along with typical chest pain with minimal exertion. Patient states she has had off/on substernal chest pain with WHITLEY on daily basis associated with lightheadedness. States these symptoms are with activity and with rest, in the past were accounted for reactive airway disease, her symptoms improves with inhalers, however not her Chest pain. Never taken SL NTG. Today while ambulating in clinic developed SOB / chest discomfort with only 10 feet. At home only able to walk up to 5 steps before having to stop and catch her breath. She ambulated with cane due to gait imbalance. In the past year, has had PND, Orthopnea and worsening LE edema. Otherwise denies any prior cardiac history, no coronary angiogram.      Severe symptomatic AS (NYHA class III, CCS class III)  The patient has undergone the following TAVR work-up:               ECHO (Date 10/22/19): MISTY 0.80 cm2, peak aortic jet velocity 4.1m/s, MG 42 mmHg, LVEF: 60 %              [facility] (Date )- Pending              STS: 3.9%               Frailty: 2/4 (Walk / handgrip)               Iliacs are >6.92 on R and >7.99 on L              LVOT area: LVOT 20.8mm, Distance 26.7mm x  21.8mm, Area 4.47cm. Avg diameter 23.9mm              Incidental findings on CT: R pulmonary nodule, hepatomegaly               CT Surgery risk assessment: inoperable              Rhythm issues: Afib, no block              PFTs: FEV1 62.3 predicted, DLCO 81%  predicted.              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, cane for gait imbalance    Past Medical History:   Diagnosis Date    Anticoagulant long-term use     on Plavix since May 2015    Anxiety     Arthritis     Asthma     Atrial fibrillation     Cataracts, bilateral     Chest pain, atypical     Colon polyp     Coronary artery disease     Diabetes mellitus     Dry eyes     Esophageal erosions     GERD (gastroesophageal reflux disease)     Heart murmur     Hemorrhoid     High cholesterol     Hypertension     Irritable bowel syndrome     Shingles 2015    TIA (transient ischemic attack)     Use of cane as ambulatory aid      Past Surgical History:   Procedure Laterality Date    ABDOMINAL SURGERY      angiocele      2007, 2014    BREAST SURGERY      left--- a lump--- no cancer    COLONOSCOPY  2014    COLONOSCOPY N/A 3/14/2018    Procedure: COLONOSCOPY;  Surgeon: Efren Kemp MD;  Location: [address]);  Service: Endoscopy;  Laterality: N/A;  ok to hold Plavix 5 days prior to procedure per Dr RESHMA Hernandez     ok per Dr Kemp to hold Eliquis 2 days prior to procedure (see telephone encounter dated-2/7/18)    HERNIA REPAIR      HYSTERECTOMY  partial    1982 partial hysterectomy    left nasal polyp      left neck lymph node      nose polyp      right hip fatty mass tissue      stent to small intestine      SUPERIOR VENA CAVA ANGIOPLASTY / STENTING      TONSILLECTOMY      TOTAL ABDOMINAL HYSTERECTOMY      2014    TOTAL KNEE ARTHROPLASTY Bilateral     UPPER GASTROINTESTINAL ENDOSCOPY  2014     Family History   Problem Relation Age of Onset    Colon cancer Neg Hx     Inflammatory bowel disease Neg Hx       Social History     Tobacco Use    Smoking status: Never Smoker    Smokeless tobacco: Never Used   Substance Use Topics    Alcohol use: No    Drug use: No      Review of patient's allergies indicates:   Allergen Reactions    Celebrex [celecoxib] Shortness Of Breath    Ciprofloxacin Swelling     lip    Dicyclomine Hives    Adhesive Dermatitis    Avelox [moxifloxacin] Swelling    Cilostazol Other (See Comments)     Elevates blood pressure    Eryc [erythromycin] Other (See Comments)     unknown    Iodine and iodide containing products Hives    Keflex [cephalexin] Hives    Meclomen      rashes    Meloxicam      Ear ringing    Metoclopramide Other (See Comments)     High blood pressure    Sulfa (sulfonamide antibiotics) Itching     Current medications reviewed    Review of Systems:  Constitutional: Negative for fever, chills, distress  Skin: no acute disorders.  Negative for rash, itching  HEENT: unremarkable.  Negative for sore throat, congestion  Cardiovascular: Negative for chest pain, orthopnea.  Positive for lower extremity edema.  Respiratory:  Positive for shortness of breath, cough.  GI:  unremarkable.  Negative for abdominal pain, vomiting  :  Negative for hematuria, flank pain  Musculoskeletal: unremarkable.  Negative for falls, myalgias  Neuro:  Negative for dizziness, LOC  Psych:  Negative for substance abuse, memory loss      OBJECTIVE:     Vital Signs (Most Recent)     Vital signs reviewed    Physical Exam:  General Appearance: no acute distress.  Normal for age  Skin: no acute lesions, minor bruises from phlebotomy  HEENT: no masses/hematoma, Jugular veins: not distended  Resp:  excursion/effort normal; clear to auscultation  CV:  Rate:  regular  Rhythm: irregular  Murmur:  systolic ejection murmur at right upper sternal border  GI: Bowel sounds: present; abdo soft, nondistended, nontender, no masses palpable  Extrem: Edema: mild  Pulses: normal  Neuro: Alert and oriented; no focal  deficit  Psych: no acute delirium noted  : voiding well  MSK: no acute findings, ranges of motion unchanged      I have personally reviewed the imaging, electrocardiogram, and pertinent lab findings    ASSESSMENT/PLAN:       74 y.o. female with multiple comorbidities and frailty presented with symptomatic severe aortic stenosis. Deemed inoperable for surgical aortic valve replacement (SAVR) due to porcelain aorta and frailty (walks with a cane).  Appropriate candidate for TAVR evaluation.  We explained the patient's condition, pathology and prognosis of severe aortic stenosis, treatment options including medical therapy, SAVR and TAVR, details of SAVR and the TAVR procedure, risks and benefits of SAVR and TAVR including myocardial infarction, stroke, pacemaker, bleeding, infection, acute renal injury, conversion to open surgery, need for emergency mechanical circulatory support, and potential complications and recovery course with patient and family, and also durability of surgical vs transcatheter valve and options for reintervention in the future.  Patient and family understood and agreed to proceed. All questions answered.    I spent >70 minutes reviewing, examining and evaluating this patient, including reviewing relevant diagnostic studies, and 50% of which were spent on patient counseling including the pt's condition, diagnosis, prognosis, expected recovery after SAVR vs TAVR, follow-up plan and next steps.

## 2019-10-28 NOTE — H&P (VIEW-ONLY)
Cardiothoracic Surgery  Transcatheter Aortic Valve Replacement Evaluation      SUBJECTIVE:     History of Present Illness:  Adriana Strong is a 74 y.o. y.o. female who presents for evaluation and treatment of severe symptomatic AS, evaluate for TAVR.               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              - Fam Hx: mother with MI, father with CVA, brother x2 with CAD.     Ms. Strong is a 74 yoF, here with spouse and daughter, followed by Dr. Carrington. Recently having progressively worsening WHITLEY, palpitations, LE edema along with typical chest pain with minimal exertion. Patient states she has had off/on substernal chest pain with WHITLEY on daily basis associated with lightheadedness. States these symptoms are with activity and with rest, in the past were accounted for reactive airway disease, her symptoms improves with inhalers, however not her Chest pain. Never taken SL NTG. Today while ambulating in clinic developed SOB / chest discomfort with only 10 feet. At home only able to walk up to 5 steps before having to stop and catch her breath. She ambulated with cane due to gait imbalance. In the past year, has had PND, Orthopnea and worsening LE edema. Otherwise denies any prior cardiac history, no coronary angiogram.      Severe symptomatic AS (NYHA class III, CCS class III)  The patient has undergone the following TAVR work-up:               ECHO (Date 10/22/19): MISTY 0.80 cm2, peak aortic jet velocity 4.1m/s, MG 42 mmHg, LVEF: 60 %              [facility] (Date )- Pending              STS: 3.9%               Frailty: 2/4 (Walk / handgrip)               Iliacs are >6.92 on R and >7.99 on L              LVOT area: LVOT 20.8mm, Distance 26.7mm x  21.8mm, Area 4.47cm. Avg diameter 23.9mm              Incidental findings on CT: R pulmonary nodule, hepatomegaly               CT Surgery risk assessment: inoperable              Rhythm issues: Afib, no block              PFTs: FEV1 62.3 predicted, DLCO 81%  predicted.              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, cane for gait imbalance    Past Medical History:   Diagnosis Date    Anticoagulant long-term use     on Plavix since May 2015    Anxiety     Arthritis     Asthma     Atrial fibrillation     Cataracts, bilateral     Chest pain, atypical     Colon polyp     Coronary artery disease     Diabetes mellitus     Dry eyes     Esophageal erosions     GERD (gastroesophageal reflux disease)     Heart murmur     Hemorrhoid     High cholesterol     Hypertension     Irritable bowel syndrome     Shingles 2015    TIA (transient ischemic attack)     Use of cane as ambulatory aid      Past Surgical History:   Procedure Laterality Date    ABDOMINAL SURGERY      angiocele      2007, 2014    BREAST SURGERY      left--- a lump--- no cancer    COLONOSCOPY  2014    COLONOSCOPY N/A 3/14/2018    Procedure: COLONOSCOPY;  Surgeon: Efren Kemp MD;  Location: [address]);  Service: Endoscopy;  Laterality: N/A;  ok to hold Plavix 5 days prior to procedure per Dr RESHMA Hernandez     ok per Dr Kemp to hold Eliquis 2 days prior to procedure (see telephone encounter dated-2/7/18)    HERNIA REPAIR      HYSTERECTOMY  partial    1982 partial hysterectomy    left nasal polyp      left neck lymph node      nose polyp      right hip fatty mass tissue      stent to small intestine      SUPERIOR VENA CAVA ANGIOPLASTY / STENTING      TONSILLECTOMY      TOTAL ABDOMINAL HYSTERECTOMY      2014    TOTAL KNEE ARTHROPLASTY Bilateral     UPPER GASTROINTESTINAL ENDOSCOPY  2014     Family History   Problem Relation Age of Onset    Colon cancer Neg Hx     Inflammatory bowel disease Neg Hx       Social History     Tobacco Use    Smoking status: Never Smoker    Smokeless tobacco: Never Used   Substance Use Topics    Alcohol use: No    Drug use: No      Review of patient's allergies indicates:   Allergen Reactions    Celebrex [celecoxib] Shortness Of Breath    Ciprofloxacin Swelling     lip    Dicyclomine Hives    Adhesive Dermatitis    Avelox [moxifloxacin] Swelling    Cilostazol Other (See Comments)     Elevates blood pressure    Eryc [erythromycin] Other (See Comments)     unknown    Iodine and iodide containing products Hives    Keflex [cephalexin] Hives    Meclomen      rashes    Meloxicam      Ear ringing    Metoclopramide Other (See Comments)     High blood pressure    Sulfa (sulfonamide antibiotics) Itching     Current medications reviewed    Review of Systems:  Constitutional: Negative for fever, chills, distress  Skin: no acute disorders.  Negative for rash, itching  HEENT: unremarkable.  Negative for sore throat, congestion  Cardiovascular: Negative for chest pain, orthopnea.  Positive for lower extremity edema.  Respiratory:  Positive for shortness of breath, cough.  GI:  unremarkable.  Negative for abdominal pain, vomiting  :  Negative for hematuria, flank pain  Musculoskeletal: unremarkable.  Negative for falls, myalgias  Neuro:  Negative for dizziness, LOC  Psych:  Negative for substance abuse, memory loss      OBJECTIVE:     Vital Signs (Most Recent)     Vital signs reviewed    Physical Exam:  General Appearance: no acute distress.  Normal for age  Skin: no acute lesions, minor bruises from phlebotomy  HEENT: no masses/hematoma, Jugular veins: not distended  Resp:  excursion/effort normal; clear to auscultation  CV:  Rate:  regular  Rhythm: irregular  Murmur:  systolic ejection murmur at right upper sternal border  GI: Bowel sounds: present; abdo soft, nondistended, nontender, no masses palpable  Extrem: Edema: mild  Pulses: normal  Neuro: Alert and oriented; no focal  deficit  Psych: no acute delirium noted  : voiding well  MSK: no acute findings, ranges of motion unchanged      I have personally reviewed the imaging, electrocardiogram, and pertinent lab findings    ASSESSMENT/PLAN:       74 y.o. female with multiple comorbidities and frailty presented with symptomatic severe aortic stenosis. Deemed inoperable for surgical aortic valve replacement (SAVR) due to porcelain aorta and frailty (walks with a cane).  Appropriate candidate for TAVR evaluation.  We explained the patient's condition, pathology and prognosis of severe aortic stenosis, treatment options including medical therapy, SAVR and TAVR, details of SAVR and the TAVR procedure, risks and benefits of SAVR and TAVR including myocardial infarction, stroke, pacemaker, bleeding, infection, acute renal injury, conversion to open surgery, need for emergency mechanical circulatory support, and potential complications and recovery course with patient and family, and also durability of surgical vs transcatheter valve and options for reintervention in the future.  Patient and family understood and agreed to proceed. All questions answered.    I spent >70 minutes reviewing, examining and evaluating this patient, including reviewing relevant diagnostic studies, and 50% of which were spent on patient counseling including the pt's condition, diagnosis, prognosis, expected recovery after SAVR vs TAVR, follow-up plan and next steps.

## 2019-10-28 NOTE — PROGRESS NOTES
PCP - Krzysztof Mcgraw MD     Referring Physician: Jr Carrington    Subjective:   Patient ID:  Adirana Strong is a 74 y.o. y.o. female who presents for evaluation and treatment of severe symptomatic AS, evaluate for TAVR.    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   - Fam Hx: mother with MI, father with CVA, brother x2 with CAD.    Ms. Strong is a 74 yoF, here with spouse and daughter, followed by Dr. Carrington. Recently having progressively worsening WHITLEY, palpitations, LE edema along with typical chest pain with minimal exertion. Patient states she has had off/on substernal chest pain with WHITLEY on daily basis associated with lightheadedness. States these symptoms are with activity and with rest, in the past were accounted for reactive airway disease, her symptoms improves with inhalers, however not her Chest pain. Never taken SL NTG. Today while ambulating in clinic developed SOB / chest discomfort with only 10 feet. At home only able to walk up to 5 steps before having to stop and catch her breath. She ambulated with cane due to gait imbalance. In the past year, has had PND, Orthopnea and worsening LE edema. Otherwise denies any prior cardiac history, no coronary angiogram.      Severe symptomatic AS (NYHA class III, CCS class III)  The patient has undergone the following TAVR work-up:    ECHO (Date 10/22/19): MISTY 0.80 cm2, peak aortic jet velocity 4.1m/s, MG 42 mmHg, LVEF: 60 %   [facility] (Date )- Pending   STS: 3.9%    Frailty: 2/4 (Walk / handgrip)    Iliacs are >6.92 on R and >7.99 on L   LVOT area: LVOT 20.8mm, Distance 26.7mm x 21.8mm, Area 4.47cm. Avg diameter 23.9mm   Incidental findings on CT: R pulmonary nodule, hepatomegaly    CT Surgery risk  assessment: inoperable per Dr. Vargas (porcelin aorta and cane)    Rhythm issues: Afib, no block   PFTs: FEV1 62.3 predicted, DLCO 81%  predicted.   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 May need to dilate LCI with 7 x 4 balloon.    History:       Meds:     Review of patient's allergies indicates:   Allergen Reactions    Celebrex [celecoxib] Shortness Of Breath    Ciprofloxacin Swelling     lip    Dicyclomine Hives    Adhesive Dermatitis    Avelox [moxifloxacin] Swelling    Cilostazol Other (See Comments)     Elevates blood pressure    Eryc [erythromycin] Other (See Comments)     unknown    Iodine and iodide containing products Hives    Keflex [cephalexin] Hives    Meclomen      rashes    Meloxicam      Ear ringing    Metoclopramide Other (See Comments)     High blood pressure    Sulfa (sulfonamide antibiotics) Itching         Review of Systems   Constitutional: Positive for malaise/fatigue. Negative for chills, fever and weight loss.   Respiratory: Positive for shortness of breath. Negative for cough and sputum production.    Cardiovascular: Positive for chest pain, palpitations and leg swelling. Negative for orthopnea, claudication and PND.   Gastrointestinal: Negative for abdominal pain, diarrhea, nausea and vomiting.   Genitourinary: Negative for dysuria and urgency.     Past Medical History:   Diagnosis Date    Anticoagulant long-term use     on Plavix since May 2015    Anxiety     Arthritis     Asthma     Atrial fibrillation     Cataracts, bilateral     Chest pain, atypical     Colon polyp     Coronary artery disease     Diabetes mellitus     Dry eyes     Esophageal erosions     GERD (gastroesophageal reflux disease)     Heart murmur     Hemorrhoid     High cholesterol     Hypertension     Irritable bowel syndrome     Shingles 2015    TIA (transient ischemic attack)     Use of cane as ambulatory aid       Current Outpatient Medications on File Prior to Visit   Medication Sig Dispense Refill    acetaminophen (TYLENOL ARTHRITIS) 650 MG TbSR Take 1,300 mg by mouth 2 (two) times daily.      albuterol (ACCUNEB) 1.25 mg/3 mL Nebu Take scheduled q4 for the next two days while at home then resume prn dosing 3 mL 1    albuterol (VENTOLIN HFA) 90 mcg/actuation inhaler Inhale 2 puffs into the lungs every 6 (six) hours as needed for Wheezing. Rescue      apixaban (ELIQUIS) 5 mg Tab Take 1 tablet (5 mg total) by mouth 2 (two) times daily. 180 tablet 3    ascorbic acid (VITAMIN C) 500 MG tablet Take 500 mg by mouth once daily.      azelastine (ASTELIN) 137 mcg nasal spray 1 spray by Nasal route 2 (two) times daily.      bisacodyl (DULCOLAX) 5 mg EC tablet Take 5 mg by mouth daily as needed for Constipation.      budesonide-formoterol 160-4.5 mcg (SYMBICORT) 160-4.5 mcg/actuation HFAA Inhale 2 puffs into the lungs every 12 (twelve) hours. Controller      calcium carbonate (OS-MICHAEL) 600 mg (1,500 mg) Tab Take 1,200 mg by mouth once daily.      clopidogrel (PLAVIX) 75 mg tablet Take 1 tablet (75 mg total) by mouth once daily. 90 tablet 4    cyanocobalamin (VITAMIN B-12) 1000 MCG tablet Take 100 mcg by mouth once daily.      digoxin (LANOXIN) 125 mcg tablet Take 1 tablet (125 mcg total) by mouth once daily. 90 tablet 3    diltiaZEM HCl (CARDIZEM LA) 240 mg 24 hr tablet Take 1 tablet (240 mg total) by mouth once daily. 90 tablet 3    DULoxetine (CYMBALTA) 20 MG capsule Take 1 capsule (20 mg total) by mouth 2 (two) times daily. 180 capsule 3    fexofenadine (ALLEGRA) 60 MG tablet Take 60 mg by mouth every morning.      fish oil-omega-3 fatty acids 300-1,000 mg capsule Take 1 g by mouth once daily.       fluticasone (FLONASE) 50 mcg/actuation nasal spray 1 spray by Each Nare route every morning.       furosemide (LASIX) 40 MG tablet Take 1 tablet (40 mg total) by mouth once daily. 90 tablet 3    gabapentin  "(NEURONTIN) 300 MG capsule Take 2 capsules (600 mg total) by mouth 3 (three) times daily. 540 capsule 3    lisinopril (PRINIVIL,ZESTRIL) 20 MG tablet Take 1 tablet (20 mg total) by mouth 2 (two) times daily. 180 tablet 3    lorazepam (ATIVAN) 0.5 MG tablet Take 0.5 mg by mouth every morning.       magnesium 250 mg Tab Take 250 mg by mouth. 3 in am and 3 pm      metFORMIN (GLUCOPHAGE) 500 MG tablet Take 500 mg by mouth 2 (two) times daily.  1    ondansetron (ZOFRAN-ODT) 4 MG TbDL DISSOLVE 1 TABLET ON TONGUE EVERY 8 HOURS AS NEEDED FOR NAUSEA  1    oxybutynin (DITROPAN-XL) 5 MG TR24 Take 5 mg by mouth every other day.       pantoprazole (PROTONIX) 40 MG tablet Take 1 tablet (40 mg total) by mouth once daily. (Patient taking differently: Take 40 mg by mouth every morning. ) 90 tablet 3    POLYSORBATE 80/GLYCERIN (REFRESH DRY EYE THERAPY OPHT) Apply to eye 2 (two) times daily.      potassium gluconate 550 mg (90 mg) Tab Take by mouth. 2 in am and 2 pm      traMADol (ULTRAM) 50 mg tablet Take 1 tablet (50 mg total) by mouth every 6 (six) hours as needed for Pain. 20 tablet 0    zafirlukast (ACCOLATE) 20 MG tablet Take 20 mg by mouth once daily.  3     No current facility-administered medications on file prior to visit.      Vitals:    10/28/19 1312 10/28/19 1315   BP: (!) 152/73 (!) 155/75   BP Location: Right arm Left arm   Patient Position: Sitting Sitting   BP Method: Large (Automatic) Large (Automatic)   Pulse: 89 89   SpO2: 97%    Weight: 103.2 kg (227 lb 8.2 oz)    Height: 5' 4" (1.626 m)      Body mass index is 39.05 kg/m².      Objective:   BP (!) 155/75 (BP Location: Left arm, Patient Position: Sitting, BP Method: Large (Automatic))   Pulse 89   Ht 5' 4" (1.626 m)   Wt 103.2 kg (227 lb 8.2 oz)   LMP 01/21/1973 (Approximate)   SpO2 97%   BMI 39.05 kg/m²   Physical Exam   Constitutional: She is oriented to person, place, and time. No distress.   HENT:   Head: Normocephalic and atraumatic.   Neck: " Normal range of motion. Neck supple. No JVD present.   Cardiovascular: Normal rate, regular rhythm and intact distal pulses. Exam reveals no gallop and no friction rub.   Murmur heard.   Systolic murmur is present with a grade of 3/6.  Pulmonary/Chest: Effort normal and breath sounds normal. No respiratory distress. She has no wheezes. She has no rales.   Abdominal: Soft. Bowel sounds are normal. She exhibits no distension. There is no tenderness.   Musculoskeletal: Normal range of motion. She exhibits edema.   Neurological: She is alert and oriented to person, place, and time.   Skin: Skin is warm and dry. She is not diaphoretic.   Nursing note and vitals reviewed.      Labs:     Lab Results   Component Value Date     10/22/2019    K 4.4 10/22/2019    CL 99 10/22/2019    CO2 30 (H) 10/22/2019    BUN 17 10/22/2019    CREATININE 0.8 10/22/2019    ANIONGAP 10 10/22/2019     Lab Results   Component Value Date    HGBA1C 7.0 (H) 02/19/2019     Lab Results   Component Value Date     (H) 06/22/2017     (H) 06/16/2017     (H) 01/30/2017       Lab Results   Component Value Date    WBC 10.64 10/22/2019    HGB 14.0 10/22/2019    HCT 42.6 10/22/2019    HCT 40 01/02/2017     10/22/2019    GRAN 5.5 02/01/2019    GRAN 58.4 02/01/2019     Lab Results   Component Value Date    CHOL 220 (H) 09/28/2016    HDL 75 09/28/2016    LDLCALC 124 09/28/2016    TRIG 104 09/28/2016       Lab Results   Component Value Date     10/22/2019    K 4.4 10/22/2019    CL 99 10/22/2019    CO2 30 (H) 10/22/2019    BUN 17 10/22/2019    CREATININE 0.8 10/22/2019    ANIONGAP 10 10/22/2019     Lab Results   Component Value Date    HGBA1C 7.0 (H) 02/19/2019     Lab Results   Component Value Date     (H) 06/22/2017     (H) 06/16/2017     (H) 01/30/2017    Lab Results   Component Value Date    WBC 10.64 10/22/2019    HGB 14.0 10/22/2019    HCT 42.6 10/22/2019    HCT 40 01/02/2017     10/22/2019     GRAN 5.5 02/01/2019    GRAN 58.4 02/01/2019     Lab Results   Component Value Date    CHOL 220 (H) 09/28/2016    HDL 75 09/28/2016    LDLCALC 124 09/28/2016    TRIG 104 09/28/2016          Assessment & Plan:     1. Old lacunar stroke without late effect:   Hx of Residual weakness, resolved post PT     2. Diabetic polyneuropathy associated with type 2 diabetes mellitus:  A1c 7.0  - Continue dietary diet, activity as tolerated,   - oral hypoglycemics     3. Mild intermittent asthma without complication   - PFTs with reduced FEV1 with normal diffuse capacity     4. Essential hypertension    5. Pure hypercholesterolemia    6. Severe symptomatic AS (NYHA class III, CCS class III)  The patient has undergone the following TAVR work-up:    ECHO (Date 10/22/19): MISTY 0.80 cm2, peak aortic jet velocity 4.1m/s, MG 42 mmHg, LVEF: 60 %   [facility] (Date )- Pending   STS: 3.9%    Frailty: 2/4 (Walk / handgrip)    Iliacs are >6.92 on R and >7.99 on L   LVOT area: LVOT 20.8mm, Distance 26.7mm x 21.8mm, Area 4.47cm. Avg diameter 23.9mm   Incidental findings on CT: R pulmonary nodule, hepatomegaly    CT Surgery risk assessment: inoperable per Dr. Vargas (porcelin aorta and cane)    Rhythm issues: Afib, no block   PFTs: FEV1 62.3 predicted, DLCO 81%  predicted.   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 May need to dilate LCI with 7 x 4 balloon.     7. Atrial fibrillation, chronic   --> [id number] score 7  --> HASBLED score of 4 with a 8.9% risk of bleeding  - Currently on Coumadin / Plavix   - Interested in Watchman device      8. BMI 38.0-38.9,adult;   - Continue exercise as tolerated     9. Difficulty walking, gait imbalance:   NYHA class III:      10. Mesenteric artery insufficiency   -LIZ stents, SMA      Plan:       Typical chest pain and Severe AS, will plan for coronary angiogram +/- PCI, patient is a BMS candidate  - Anti-platelet Therapy: ASA / Plavix  (PRU on procedure day)  - Access: RCFA, 6Fr  - Catheters: 4 Fr JR 4 and JL5  - Creatinine/CrCl: 0.8  - Allergies: iodine (needs prep)  - Pre-Hydration: NS 125cc/hr  - Pre-Op Med: Prep   - All patient's questions were answered.  -The risks, benefits and alternatives of the procedure were explained to the patient.   -The risks of coronary angiography include but are not limited to: bleeding, infection, heart rhythm abnormalities, allergic reactions, kidney injury and potential need for dialysis, stroke and death.   - Should stenting be indicated, the patient has agreed to dual anti-platelet therapy for 1-consecutive year with a drug-eluting stent and a minimum of 1-month with the use of a bare metal stent  - Additionally, pt is aware that non-compliance is likely to result in stent clotting with heart attack, heart failure, and/or death  -The risks of moderate sedation include hypotension, respiratory depression, arrhythmias, bronchospasm, and death.   - Informed consent was obtained and the  patient is agreeable to proceed with the procedure.    Fungal rash, Left:   - Nystatin / Diflucan ordered    Staff:  I have personally taken the history and examined this patient and agree with the fellow's note as stated above and amended it accordingly :-) She is inoperable surgically and needs coronary angiogram to complete her TAVR evaluation. Then will return for LTF TAVR.        Signed:  Nikko Merritt M.D.  Page # [phone]  Cardiovascular Fellow PGY-V  [facility]

## 2019-10-28 NOTE — PROGRESS NOTES
OUTPATIENT CATHETERIZATION INSTRUCTIONS    You have been scheduled for a procedure in the catheterization lab on Tuesday, November 4, 2019.     Please report to the Cardiology Waiting Area on the Third floor of the hospital and check in at 10 AM.   You will then be taken to the SSCU (Short Stay Cardiac Unit) and prepared for your procedure. Please be aware that this is not the time of your procedure but the time you are to arrive. The procedures are scheduled on an hourly basis; however, emergency cases take precedence over all other cases.       You may not have anything to eat or drink after midnight the night before your test. You may take your regular morning medications with water. If there are any medications that you should not take you will be instructed to hold them that morning. If you are diabetic and on Metformin (Glucophage) do not take it the day before, the day of, and for 2 days after your procedure.      The procedure will take 1-2 hours to perform. After the procedure, you will return to SSCU on the third floor of the hospital. You will need to lie still (or keep your arm still) for the next 4 to 6 hours to minimize bleeding from the puncture site. Your family may remain in the room with you during this time.       You may be able to be discharged home that same afternoon if there is someone to drive you home and there were no complications. If you have one of the balloon, stent, or device procedures you may spend the night in the hospital. Your doctor will determine, based on your progress, the date and time of your discharge. The results of your procedure will be discussed with you before you are discharged. Any further testing or procedures will be scheduled for you either before you leave or you will be called with these appointments.       If you should have any questions, concerns, or need to change the date of your procedure, please call  SILVINO hTomas @ [phone]    Special  Instructions:  Start Aspirin 81 mg daily  Stop Eliquis 5 days before  Continue plavix        THE ABOVE INSTRUCTIONS WERE GIVEN TO THE PATIENT VERBALLY AND THEY VERBALIZED UNDERSTANDING.  THEY DO NOT REQUIRE ANY SPECIAL NEEDS AND DO NOT HAVE ANY LEARNING BARRIERS.          Directions for Reporting to Cardiology Waiting Area in the Hospital  If you park in the Parking Garage:  Take elevators to the1st floor of the parking garage.  Continue past the gift shop, coffee shop, and piano.  Take a right and go to the gold elevators. (Elevator B)  Take the elevator to the 3rd floor.  Follow the arrow on the sign on the wall that says Cath Lab Registration/EP Lab Registration.  Follow the long hallway all the way around until you come to a big open area.  This is the registration area.  Check in at Reception Desk.    OR    If family is dropping you off:  Have them drop you off at the front of the Hospital under the green overhang.  Enter through the doors and take a right.  Take the E elevators to the 3rd floor Cardiology Waiting Area.  Check in at the Reception Desk in the waiting room.

## 2019-10-29 NOTE — PROCEDURES
Adriana Strong is a 74 y.o.  female patient, who presents for a 6 minute walk test ordered by MD Liborio.  The diagnosis is Aortic Valve Disorder.  The patient's BMI is 40.7 kg/m2.  Predicted distance (lower limit of normal) is 180.95 meters.      Test Results:    The test was completed with stops.  The patient stopped 1 times for a total of 90 seconds.  The total time walked was 270 seconds.  During walking, the patient reported:  Dyspnea, Lightheadedness, Other (Comment)(Heart racing, dizzy). The patient used a wheelchair  during testing.     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      CLINICAL INTERPRETATION:  Six minute walk distance is 110.34 meters (362 feet) with somewhat heavy dyspnea.  During exercise, there was no significant desaturation while breathing room air.  Blood pressure remained stable and Heart rate increased significantly with walking.  This may represent a tachycardic response to exercise.  The patient reported non-pulmonary symptoms during exercise.  Severe exercise impairment is likely due to cardiovascular causes and subjective symptoms.  The patient did complete the study, walking 270 seconds of the 360 second test.  No previous study performed.  Based upon age and body mass index, exercise capacity is less than predicted.

## 2019-11-05 ENCOUNTER — HOSPITAL ENCOUNTER (OUTPATIENT)
Facility: HOSPITAL | Age: 74
Discharge: HOME | End: 2019-11-05
Attending: INTERNAL MEDICINE | Admitting: INTERNAL MEDICINE
Payer: MEDICARE

## 2019-11-05 VITALS
TEMPERATURE: 97 F | SYSTOLIC BLOOD PRESSURE: 135 MMHG | DIASTOLIC BLOOD PRESSURE: 61 MMHG | RESPIRATION RATE: 18 BRPM | HEIGHT: 64 IN | BODY MASS INDEX: 39.61 KG/M2 | HEART RATE: 75 BPM | OXYGEN SATURATION: 94 % | WEIGHT: 232 LBS

## 2019-11-05 DIAGNOSIS — Z86.73 OLD LACUNAR STROKE WITHOUT LATE EFFECT: ICD-10-CM

## 2019-11-05 DIAGNOSIS — I48.20 ATRIAL FIBRILLATION, CHRONIC: ICD-10-CM

## 2019-11-05 DIAGNOSIS — I35.0 SEVERE AORTIC STENOSIS: ICD-10-CM

## 2019-11-05 DIAGNOSIS — I35.0 SEVERE AORTIC STENOSIS: Primary | ICD-10-CM

## 2019-11-05 LAB
ABO + RH BLD: NORMAL
ANION GAP SERPL CALC-SCNC: 11 MMOL/L (ref 8–16)
BASOPHILS # BLD AUTO: 0.03 K/UL (ref 0–0.2)
BASOPHILS NFR BLD: 0.4 % (ref 0–1.9)
BLD GP AB SCN CELLS X3 SERPL QL: NORMAL
BUN SERPL-MCNC: 13 MG/DL (ref 8–23)
CALCIUM SERPL-MCNC: 9.6 MG/DL (ref 8.7–10.5)
CHLORIDE SERPL-SCNC: 97 MMOL/L (ref 95–110)
CO2 SERPL-SCNC: 29 MMOL/L (ref 23–29)
CREAT SERPL-MCNC: 0.8 MG/DL (ref 0.5–1.4)
DIFFERENTIAL METHOD: ABNORMAL
EOSINOPHIL # BLD AUTO: 0 K/UL (ref 0–0.5)
EOSINOPHIL NFR BLD: 0 % (ref 0–8)
ERYTHROCYTE [DISTWIDTH] IN BLOOD BY AUTOMATED COUNT: 14.4 % (ref 11.5–14.5)
EST. GFR  (AFRICAN AMERICAN): >60 ML/MIN/1.73 M^2
EST. GFR  (NON AFRICAN AMERICAN): >60 ML/MIN/1.73 M^2
GLUCOSE SERPL-MCNC: 201 MG/DL (ref 70–110)
HCT VFR BLD AUTO: 41.2 % (ref 37–48.5)
HGB BLD-MCNC: 13.9 G/DL (ref 12–16)
IMM GRANULOCYTES # BLD AUTO: 0.06 K/UL (ref 0–0.04)
IMM GRANULOCYTES NFR BLD AUTO: 0.7 % (ref 0–0.5)
LYMPHOCYTES # BLD AUTO: 1.5 K/UL (ref 1–4.8)
LYMPHOCYTES NFR BLD: 18.8 % (ref 18–48)
MCH RBC QN AUTO: 31.7 PG (ref 27–31)
MCHC RBC AUTO-ENTMCNC: 33.7 G/DL (ref 32–36)
MCV RBC AUTO: 94 FL (ref 82–98)
MONOCYTES # BLD AUTO: 0.2 K/UL (ref 0.3–1)
MONOCYTES NFR BLD: 2.2 % (ref 4–15)
NEUTROPHILS # BLD AUTO: 6.3 K/UL (ref 1.8–7.7)
NEUTROPHILS NFR BLD: 77.9 % (ref 38–73)
NRBC BLD-RTO: 0 /100 WBC
PLATELET # BLD AUTO: 266 K/UL (ref 150–350)
PLATELET RESPONSE PLAVIX: 202 PRU (ref 194–418)
PMV BLD AUTO: 10.5 FL (ref 9.2–12.9)
POCT GLUCOSE: 204 MG/DL (ref 70–110)
POTASSIUM SERPL-SCNC: 4.1 MMOL/L (ref 3.5–5.1)
RBC # BLD AUTO: 4.39 M/UL (ref 4–5.4)
SODIUM SERPL-SCNC: 137 MMOL/L (ref 136–145)
WBC # BLD AUTO: 8.04 K/UL (ref 3.9–12.7)

## 2019-11-05 PROCEDURE — 25000003 PHARM REV CODE 250: Performed by: INTERNAL MEDICINE

## 2019-11-05 PROCEDURE — 93454 CORONARY ARTERY ANGIO S&I: CPT | Mod: 26,,, | Performed by: INTERNAL MEDICINE

## 2019-11-05 PROCEDURE — 63600175 PHARM REV CODE 636 W HCPCS: Performed by: INTERNAL MEDICINE

## 2019-11-05 PROCEDURE — C1894 INTRO/SHEATH, NON-LASER: HCPCS | Performed by: INTERNAL MEDICINE

## 2019-11-05 PROCEDURE — 85025 COMPLETE CBC W/AUTO DIFF WBC: CPT

## 2019-11-05 PROCEDURE — S0077 INJECTION, CLINDAMYCIN PHOSP: HCPCS | Performed by: INTERNAL MEDICINE

## 2019-11-05 PROCEDURE — 80048 BASIC METABOLIC PNL TOTAL CA: CPT

## 2019-11-05 PROCEDURE — 99152 MOD SED SAME PHYS/QHP 5/>YRS: CPT | Mod: ,,, | Performed by: INTERNAL MEDICINE

## 2019-11-05 PROCEDURE — 93454 CORONARY ARTERY ANGIO S&I: CPT | Performed by: INTERNAL MEDICINE

## 2019-11-05 PROCEDURE — 25500020 PHARM REV CODE 255: Performed by: INTERNAL MEDICINE

## 2019-11-05 PROCEDURE — 99152 MOD SED SAME PHYS/QHP 5/>YRS: CPT | Performed by: INTERNAL MEDICINE

## 2019-11-05 PROCEDURE — 85576 BLOOD PLATELET AGGREGATION: CPT

## 2019-11-05 PROCEDURE — 99152 PR MOD CONSCIOUS SEDATION, SAME PHYS, 5+ YRS, FIRST 15 MIN: ICD-10-PCS | Mod: ,,, | Performed by: INTERNAL MEDICINE

## 2019-11-05 PROCEDURE — C1760 CLOSURE DEV, VASC: HCPCS | Performed by: INTERNAL MEDICINE

## 2019-11-05 PROCEDURE — 63600175 PHARM REV CODE 636 W HCPCS: Performed by: STUDENT IN AN ORGANIZED HEALTH CARE EDUCATION/TRAINING PROGRAM

## 2019-11-05 PROCEDURE — 82962 GLUCOSE BLOOD TEST: CPT

## 2019-11-05 PROCEDURE — 93454 PR CATH PLACE/CORONARY ANGIO, IMG SUPER/INTERP: ICD-10-PCS | Mod: 26,,, | Performed by: INTERNAL MEDICINE

## 2019-11-05 PROCEDURE — 86850 RBC ANTIBODY SCREEN: CPT

## 2019-11-05 PROCEDURE — C1769 GUIDE WIRE: HCPCS | Performed by: INTERNAL MEDICINE

## 2019-11-05 RX ORDER — NITROGLYCERIN 5 MG/ML
INJECTION, SOLUTION INTRAVENOUS
Status: DISCONTINUED | OUTPATIENT
Start: 2019-11-05 | End: 2019-11-05 | Stop reason: HOSPADM

## 2019-11-05 RX ORDER — PREDNISONE 50 MG/1
TABLET ORAL
COMMUNITY
End: 2020-02-11

## 2019-11-05 RX ORDER — ASPIRIN 81 MG/1
81 TABLET ORAL DAILY
Status: ON HOLD | COMMUNITY
End: 2019-11-05 | Stop reason: HOSPADM

## 2019-11-05 RX ORDER — DIPHENHYDRAMINE HCL 25 MG
50 CAPSULE ORAL ONCE
Status: CANCELLED | OUTPATIENT
Start: 2019-11-05 | End: 2019-11-05

## 2019-11-05 RX ORDER — LIDOCAINE HYDROCHLORIDE 20 MG/ML
INJECTION, SOLUTION EPIDURAL; INFILTRATION; INTRACAUDAL; PERINEURAL
Status: DISCONTINUED | OUTPATIENT
Start: 2019-11-05 | End: 2019-11-05 | Stop reason: HOSPADM

## 2019-11-05 RX ORDER — FLUCONAZOLE 150 MG/1
150 TABLET ORAL ONCE
Qty: 1 TABLET | Refills: 0 | Status: SHIPPED | OUTPATIENT
Start: 2019-11-05 | End: 2019-11-05

## 2019-11-05 RX ORDER — CLINDAMYCIN PHOSPHATE 900 MG/50ML
INJECTION, SOLUTION INTRAVENOUS
Status: DISCONTINUED | OUTPATIENT
Start: 2019-11-05 | End: 2019-11-05 | Stop reason: HOSPADM

## 2019-11-05 RX ORDER — HEPARIN SODIUM 200 [USP'U]/100ML
INJECTION, SOLUTION INTRAVENOUS
Status: DISCONTINUED | OUTPATIENT
Start: 2019-11-05 | End: 2019-11-05 | Stop reason: HOSPADM

## 2019-11-05 RX ORDER — DIPHENHYDRAMINE HCL 50 MG
50 CAPSULE ORAL EVERY 6 HOURS
Refills: 0 | COMMUNITY
Start: 2019-11-05 | End: 2019-11-06

## 2019-11-05 RX ORDER — FENTANYL CITRATE 50 UG/ML
INJECTION, SOLUTION INTRAMUSCULAR; INTRAVENOUS
Status: DISCONTINUED | OUTPATIENT
Start: 2019-11-05 | End: 2019-11-05 | Stop reason: HOSPADM

## 2019-11-05 RX ORDER — FAMOTIDINE 20 MG/1
20 TABLET, FILM COATED ORAL EVERY 6 HOURS
Qty: 3 TABLET | Refills: 0 | Status: SHIPPED | OUTPATIENT
Start: 2019-11-05 | End: 2020-07-23 | Stop reason: ALTCHOICE

## 2019-11-05 RX ORDER — ZINC GLUCONATE 13.3 MG
300 LOZENGE ORAL
Status: ON HOLD | COMMUNITY
End: 2020-10-21 | Stop reason: HOSPADM

## 2019-11-05 RX ORDER — SODIUM CHLORIDE 9 MG/ML
INJECTION, SOLUTION INTRAVENOUS CONTINUOUS
Status: CANCELLED | OUTPATIENT
Start: 2019-11-05

## 2019-11-05 RX ORDER — SODIUM CHLORIDE 9 MG/ML
INJECTION, SOLUTION INTRAVENOUS CONTINUOUS
Status: DISCONTINUED | OUTPATIENT
Start: 2019-11-05 | End: 2019-11-05 | Stop reason: HOSPADM

## 2019-11-05 RX ORDER — DIPHENHYDRAMINE HCL 25 MG
50 CAPSULE ORAL
COMMUNITY
End: 2020-07-23

## 2019-11-05 RX ORDER — PREDNISONE 50 MG/1
50 TABLET ORAL EVERY 6 HOURS
Qty: 3 TABLET | Refills: 0 | Status: SHIPPED | OUTPATIENT
Start: 2019-11-05 | End: 2019-11-06

## 2019-11-05 RX ORDER — MIDAZOLAM HYDROCHLORIDE 1 MG/ML
INJECTION, SOLUTION INTRAMUSCULAR; INTRAVENOUS
Status: DISCONTINUED | OUTPATIENT
Start: 2019-11-05 | End: 2019-11-05 | Stop reason: HOSPADM

## 2019-11-05 RX ADMIN — SODIUM CHLORIDE: 0.9 INJECTION, SOLUTION INTRAVENOUS at 10:11

## 2019-11-05 NOTE — INTERVAL H&P NOTE
The patient has been examined and the H&P has been reviewed:    I concur with the findings and no changes have occurred since H&P was written.    Anesthesia/Surgery risks, benefits and alternative options discussed and understood by patient/family.          Active Hospital Problems    Diagnosis  POA    Old lacunar stroke without late effect [Z86.73]  Not Applicable     On Eliquis and plavix        Resolved Hospital Problems   No resolved problems to display.       Kaylin Bello MD  Interventional Cardiovascular Fellow, PGY7  Pager: [phone]

## 2019-11-05 NOTE — H&P (VIEW-ONLY)
INTERVENTIONAL CARDIOLOGY  [facility]        Referring Physician: Jr Carrington  HISTORY OF PRESENT ILLNESS:    Adriana Strong is a pleasant 74. y.o. female who presents initially for evaluation and treatment of severe symptomatic AS, evaluate for TAVR.  She has past medical history of permanent-Afib on Eliquis, Severe AS, HLD, CVA/TIA without residual deficits, NIDDM type II with neuropathy, Mesenteric ischemic (LIZ stents, SMA  01/2016), GERD, provoked DVT, HFpEF, HTN, iodine allergy (Hives).  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 Carrington. Recently having progressively worsening WHITLEY, palpitations, LE edema along with typical chest pain with minimal exertion. Patient states she has had off/on substernal chest pain with WHITLEY on daily basis associated with lightheadedness. States these symptoms are with activity and with rest, in the past were accounted for reactive airway disease, her symptoms improves with inhalers, however not her Chest pain. At home only able to walk up to 5 steps before having to stop and catch her breath. She ambulated with cane due to gait imbalance. In the past year, has had PND, Orthopnea and worsening LE edema. Otherwise denies any prior cardiac history, no coronary angiogram.     Severe symptomatic AS (NYHA class III, CCS class III):    The patient has undergone the following TAVR work-up:               ECHO (Date 10/22/19): MISTY 0.80 cm2, peak aortic jet velocity 4.1m/s, MG 42 mmHg, LVEF: 60 %              LHC (11/5/2019 by Dr Capone)- 70% tubular long mid LAD lesion with small distal LAD vessel, plan to treat medically giving that PCI may risk jailing a good size Diagonal branch.              STS: 3.9%               Frailty: 2/4 (Walk / handgrip)               Iliacs are >6.92 on R and >7.99 on L              LVOT area: LVOT 20.8mm, Distance 26.7mm x 21.8mm, Area 4.47cm. Avg diameter 23.9mm               Incidental findings on CT: R pulmonary nodule, hepatomegaly               CT Surgery risk assessment: inoperable per Dr. Vargas (porcelin aorta and cane)               Rhythm issues: Afib, no block              PFTs: FEV1 62.3 predicted, DLCO 81%  predicted.              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, cane for gait imbalance     OK for 26 S3 -1cc (15% OS). May need to dilate LCI with 7 x 4 balloon.    Past Medical History:   Diagnosis Date    Anticoagulant long-term use     on Plavix since May 2015    Anxiety     Arthritis     Asthma     Atrial fibrillation     Cataracts, bilateral     Chest pain, atypical     Colon polyp     Coronary artery disease     Diabetes mellitus     Dry eyes     Esophageal erosions     GERD (gastroesophageal reflux disease)     Heart murmur     Hemorrhoid     High cholesterol     Hypertension     Irritable bowel syndrome     Shingles 2015    TIA (transient ischemic attack)     Use of cane as ambulatory aid        Past Surgical History:   Procedure Laterality Date    ABDOMINAL SURGERY      angiocele      2007, 2014    BREAST SURGERY      left--- a lump--- no cancer    COLONOSCOPY  2014    COLONOSCOPY N/A 3/14/2018    Procedure: COLONOSCOPY;  Surgeon: Efren Kemp MD;  Location: [facility] ([address] [place] [address]);  Service: Endoscopy;  Laterality: N/A;  ok to hold Plavix 5 days prior to procedure per Dr RESHMA Hernandez     ok per Dr Kmep to hold Eliquis 2 days prior to procedure (see telephone encounter dated-2/7/18)    HERNIA REPAIR      HYSTERECTOMY  partial    1982 partial hysterectomy    left nasal polyp      left neck lymph node      nose polyp      right hip fatty mass tissue      stent to small intestine      SUPERIOR VENA CAVA ANGIOPLASTY / STENTING      TONSILLECTOMY      TOTAL ABDOMINAL HYSTERECTOMY      2014    TOTAL KNEE ARTHROPLASTY Bilateral     UPPER GASTROINTESTINAL ENDOSCOPY  2014       ROS    BP (!) 148/67 (BP Location: Right arm,  "Patient Position: Lying)   Pulse 81   Temp 96.9 °F (36.1 °C) (Oral)   Resp 18   Ht 5' 4" (1.626 m)   Wt 105.2 kg (232 lb)   LMP 01/21/1973 (Approximate)   SpO2 (!) 91%   Breastfeeding? No   BMI 39.82 kg/m²     Physical Exam    CMP  Sodium   Date Value Ref Range Status   11/05/2019 137 136 - 145 mmol/L Final     Potassium   Date Value Ref Range Status   11/05/2019 4.1 3.5 - 5.1 mmol/L Final     Chloride   Date Value Ref Range Status   11/05/2019 97 95 - 110 mmol/L Final     CO2   Date Value Ref Range Status   11/05/2019 29 23 - 29 mmol/L Final     Glucose   Date Value Ref Range Status   11/05/2019 201 (H) 70 - 110 mg/dL Final     BUN, Bld   Date Value Ref Range Status   11/05/2019 13 8 - 23 mg/dL Final     Creatinine   Date Value Ref Range Status   11/05/2019 0.8 0.5 - 1.4 mg/dL Final     Calcium   Date Value Ref Range Status   11/05/2019 9.6 8.7 - 10.5 mg/dL Final     Total Protein   Date Value Ref Range Status   02/01/2019 7.1 6.0 - 8.4 g/dL Final     Albumin   Date Value Ref Range Status   10/22/2019 3.8 3.5 - 5.2 g/dL Final     Total Bilirubin   Date Value Ref Range Status   02/01/2019 0.4 0.1 - 1.0 mg/dL Final     Comment:     For infants and newborns, interpretation of results should be based  on gestational age, weight and in agreement with clinical  observations.  Premature Infant recommended reference ranges:  Up to 24 hours.............<8.0 mg/dL  Up to 48 hours............<12.0 mg/dL  3-5 days..................<15.0 mg/dL  6-29 days.................<15.0 mg/dL       Alkaline Phosphatase   Date Value Ref Range Status   02/01/2019 47 (L) 55 - 135 U/L Final     AST   Date Value Ref Range Status   02/01/2019 34 10 - 40 U/L Final     ALT   Date Value Ref Range Status   02/01/2019 35 10 - 44 U/L Final     Anion Gap   Date Value Ref Range Status   11/05/2019 11 8 - 16 mmol/L Final     eGFR if    Date Value Ref Range Status   11/05/2019 >60.0 >60 mL/min/1.73 m^2 Final     eGFR if non "    Date Value Ref Range Status   11/05/2019 >60.0 >60 mL/min/1.73 m^2 Final     Comment:     Calculation used to obtain the estimated glomerular filtration  rate (eGFR) is the CKD-EPI equation.        Lab Results   Component Value Date    WBC 8.04 11/05/2019    HGB 13.9 11/05/2019    HCT 41.2 11/05/2019    MCV 94 11/05/2019     11/05/2019     Lab Results   Component Value Date    APTT 38.2 (H) 01/30/2017     Lab Results   Component Value Date    INR 1.0 10/22/2019    INR 1.1 11/20/2017    INR 1.0 05/28/2017       ASSESSMENT/PLAN:     Patient Active Problem List   Diagnosis    Enlarged ovary    Controlled type 2 diabetes mellitus with diabetic polyneuropathy, without long-term current use of insulin    Asthma    Chronic mesenteric ischemia    Mesenteric artery insufficiency    Gastroesophageal reflux disease    Essential hypertension    Hyperlipidemia    Warfarin anticoagulation    Old lacunar stroke without late effect    Constipation    Severe aortic stenosis    Umbilical hernia without obstruction and without gangrene    BMI 38.0-38.9,adult    Incisional hernia, without obstruction or gangrene    COPD (chronic obstructive pulmonary disease)    Atrial fibrillation, chronic    Costochondritis    Diabetic polyneuropathy associated with type 2 diabetes mellitus    DDD (degenerative disc disease), lumbar    Cataract    Low back pain    Difficulty walking    Muscle weakness    Balance problems    Decreased range of motion of trunk and back    Lumbar spondylosis    Neuroforaminal stenosis of lumbar spine    Spinal stenosis of lumbar region with neurogenic claudication    Lumbar radiculopathy       Severe symptomatic AS (NYHA class III, CCS class III)  The patient has undergone the following TAVR work-up:               ECHO (Date 10/22/19): MISTY 0.80 cm2, peak aortic jet velocity 4.1m/s, MG 42 mmHg, LVEF: 60 %              LHC (11/5/2019 by Dr Capone)- 70% tubular long  mid LAD lesion with small distal LAD vessel, plan to treat medically giving that PCI may risk jailing a good size Diagonal branch.              STS: 3.9%               Frailty: 2/4 (Walk / handgrip)               Iliacs are >6.92 on R and >7.99 on L              LVOT area: LVOT 20.8mm, Distance 26.7mm x 21.8mm, Area 4.47cm. Avg diameter 23.9mm              Incidental findings on CT: R pulmonary nodule, hepatomegaly               CT Surgery risk assessment: inoperable per Dr. Vargas (porcelin aorta and cane)               Rhythm issues: Afib, no block              PFTs: FEV1 62.3 predicted, DLCO 81%  predicted.              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, cane for gait imbalance     OK for 26 S3 -1cc (15% OS). May need to dilate LCI with 7 x 4 balloon.    The risks, benefits & alternatives of the procedure were explained to the patient.   The risks of TAVR include but are not limited to: Bleeding, infection, heart rhythm abnormalities, allergic reactions, stroke and death.   Informed consent was obtained & the patient is agreeable to proceed with the procedure.        Atrial fibrillation, chronic              [id number] score 7             HASBLED score of 4 with a 8.9% risk of bleeding             Currently on Coumadin / Plavix              Interested in Watchman device       Kaylin Bello MD  Interventional Cardiovascular Fellow, PGY7  Pager: [phone]

## 2019-11-05 NOTE — PROGRESS NOTES
INTERVENTIONAL CARDIOLOGY  [facility]        Referring Physician: Jr Carrington  HISTORY OF PRESENT ILLNESS:    Adriana Strong is a pleasant 74. y.o. female who presents initially for evaluation and treatment of severe symptomatic AS, evaluate for TAVR.  She has past medical history of permanent-Afib on Eliquis, Severe AS, HLD, CVA/TIA without residual deficits, NIDDM type II with neuropathy, Mesenteric ischemic (LIZ stents, SMA  01/2016), GERD, provoked DVT, HFpEF, HTN, iodine allergy (Hives).  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 Carrington. Recently having progressively worsening WHITLEY, palpitations, LE edema along with typical chest pain with minimal exertion. Patient states she has had off/on substernal chest pain with WHITLEY on daily basis associated with lightheadedness. States these symptoms are with activity and with rest, in the past were accounted for reactive airway disease, her symptoms improves with inhalers, however not her Chest pain. At home only able to walk up to 5 steps before having to stop and catch her breath. She ambulated with cane due to gait imbalance. In the past year, has had PND, Orthopnea and worsening LE edema. Otherwise denies any prior cardiac history, no coronary angiogram.     Severe symptomatic AS (NYHA class III, CCS class III):    The patient has undergone the following TAVR work-up:               ECHO (Date 10/22/19): MISTY 0.80 cm2, peak aortic jet velocity 4.1m/s, MG 42 mmHg, LVEF: 60 %              LHC (11/5/2019 by Dr Capone)- 70% tubular long mid LAD lesion with small distal LAD vessel, plan to treat medically giving that PCI may risk jailing a good size Diagonal branch.              STS: 3.9%               Frailty: 2/4 (Walk / handgrip)               Iliacs are >6.92 on R and >7.99 on L              LVOT area: LVOT 20.8mm, Distance 26.7mm x 21.8mm, Area 4.47cm. Avg diameter 23.9mm               Incidental findings on CT: R pulmonary nodule, hepatomegaly               CT Surgery risk assessment: inoperable per Dr. Vargas (porcelin aorta and cane)               Rhythm issues: Afib, no block              PFTs: FEV1 62.3 predicted, DLCO 81%  predicted.              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, cane for gait imbalance     OK for 26 S3 -1cc (15% OS). May need to dilate LCI with 7 x 4 balloon.    Past Medical History:   Diagnosis Date    Anticoagulant long-term use     on Plavix since May 2015    Anxiety     Arthritis     Asthma     Atrial fibrillation     Cataracts, bilateral     Chest pain, atypical     Colon polyp     Coronary artery disease     Diabetes mellitus     Dry eyes     Esophageal erosions     GERD (gastroesophageal reflux disease)     Heart murmur     Hemorrhoid     High cholesterol     Hypertension     Irritable bowel syndrome     Shingles 2015    TIA (transient ischemic attack)     Use of cane as ambulatory aid        Past Surgical History:   Procedure Laterality Date    ABDOMINAL SURGERY      angiocele      2007, 2014    BREAST SURGERY      left--- a lump--- no cancer    COLONOSCOPY  2014    COLONOSCOPY N/A 3/14/2018    Procedure: COLONOSCOPY;  Surgeon: Efren Kemp MD;  Location: [facility] ([address] [place] [address]);  Service: Endoscopy;  Laterality: N/A;  ok to hold Plavix 5 days prior to procedure per Dr RESHMA Hernandez     ok per Dr Kemp to hold Eliquis 2 days prior to procedure (see telephone encounter dated-2/7/18)    HERNIA REPAIR      HYSTERECTOMY  partial    1982 partial hysterectomy    left nasal polyp      left neck lymph node      nose polyp      right hip fatty mass tissue      stent to small intestine      SUPERIOR VENA CAVA ANGIOPLASTY / STENTING      TONSILLECTOMY      TOTAL ABDOMINAL HYSTERECTOMY      2014    TOTAL KNEE ARTHROPLASTY Bilateral     UPPER GASTROINTESTINAL ENDOSCOPY  2014       ROS    BP (!) 148/67 (BP Location: Right arm,  "Patient Position: Lying)   Pulse 81   Temp 96.9 °F (36.1 °C) (Oral)   Resp 18   Ht 5' 4" (1.626 m)   Wt 105.2 kg (232 lb)   LMP 01/21/1973 (Approximate)   SpO2 (!) 91%   Breastfeeding? No   BMI 39.82 kg/m²     Physical Exam    CMP  Sodium   Date Value Ref Range Status   11/05/2019 137 136 - 145 mmol/L Final     Potassium   Date Value Ref Range Status   11/05/2019 4.1 3.5 - 5.1 mmol/L Final     Chloride   Date Value Ref Range Status   11/05/2019 97 95 - 110 mmol/L Final     CO2   Date Value Ref Range Status   11/05/2019 29 23 - 29 mmol/L Final     Glucose   Date Value Ref Range Status   11/05/2019 201 (H) 70 - 110 mg/dL Final     BUN, Bld   Date Value Ref Range Status   11/05/2019 13 8 - 23 mg/dL Final     Creatinine   Date Value Ref Range Status   11/05/2019 0.8 0.5 - 1.4 mg/dL Final     Calcium   Date Value Ref Range Status   11/05/2019 9.6 8.7 - 10.5 mg/dL Final     Total Protein   Date Value Ref Range Status   02/01/2019 7.1 6.0 - 8.4 g/dL Final     Albumin   Date Value Ref Range Status   10/22/2019 3.8 3.5 - 5.2 g/dL Final     Total Bilirubin   Date Value Ref Range Status   02/01/2019 0.4 0.1 - 1.0 mg/dL Final     Comment:     For infants and newborns, interpretation of results should be based  on gestational age, weight and in agreement with clinical  observations.  Premature Infant recommended reference ranges:  Up to 24 hours.............<8.0 mg/dL  Up to 48 hours............<12.0 mg/dL  3-5 days..................<15.0 mg/dL  6-29 days.................<15.0 mg/dL       Alkaline Phosphatase   Date Value Ref Range Status   02/01/2019 47 (L) 55 - 135 U/L Final     AST   Date Value Ref Range Status   02/01/2019 34 10 - 40 U/L Final     ALT   Date Value Ref Range Status   02/01/2019 35 10 - 44 U/L Final     Anion Gap   Date Value Ref Range Status   11/05/2019 11 8 - 16 mmol/L Final     eGFR if    Date Value Ref Range Status   11/05/2019 >60.0 >60 mL/min/1.73 m^2 Final     eGFR if non "    Date Value Ref Range Status   11/05/2019 >60.0 >60 mL/min/1.73 m^2 Final     Comment:     Calculation used to obtain the estimated glomerular filtration  rate (eGFR) is the CKD-EPI equation.        Lab Results   Component Value Date    WBC 8.04 11/05/2019    HGB 13.9 11/05/2019    HCT 41.2 11/05/2019    MCV 94 11/05/2019     11/05/2019     Lab Results   Component Value Date    APTT 38.2 (H) 01/30/2017     Lab Results   Component Value Date    INR 1.0 10/22/2019    INR 1.1 11/20/2017    INR 1.0 05/28/2017       ASSESSMENT/PLAN:     Patient Active Problem List   Diagnosis    Enlarged ovary    Controlled type 2 diabetes mellitus with diabetic polyneuropathy, without long-term current use of insulin    Asthma    Chronic mesenteric ischemia    Mesenteric artery insufficiency    Gastroesophageal reflux disease    Essential hypertension    Hyperlipidemia    Warfarin anticoagulation    Old lacunar stroke without late effect    Constipation    Severe aortic stenosis    Umbilical hernia without obstruction and without gangrene    BMI 38.0-38.9,adult    Incisional hernia, without obstruction or gangrene    COPD (chronic obstructive pulmonary disease)    Atrial fibrillation, chronic    Costochondritis    Diabetic polyneuropathy associated with type 2 diabetes mellitus    DDD (degenerative disc disease), lumbar    Cataract    Low back pain    Difficulty walking    Muscle weakness    Balance problems    Decreased range of motion of trunk and back    Lumbar spondylosis    Neuroforaminal stenosis of lumbar spine    Spinal stenosis of lumbar region with neurogenic claudication    Lumbar radiculopathy       Severe symptomatic AS (NYHA class III, CCS class III)  The patient has undergone the following TAVR work-up:               ECHO (Date 10/22/19): MISTY 0.80 cm2, peak aortic jet velocity 4.1m/s, MG 42 mmHg, LVEF: 60 %              LHC (11/5/2019 by Dr Capone)- 70% tubular long  mid LAD lesion with small distal LAD vessel, plan to treat medically giving that PCI may risk jailing a good size Diagonal branch.              STS: 3.9%               Frailty: 2/4 (Walk / handgrip)               Iliacs are >6.92 on R and >7.99 on L              LVOT area: LVOT 20.8mm, Distance 26.7mm x 21.8mm, Area 4.47cm. Avg diameter 23.9mm              Incidental findings on CT: R pulmonary nodule, hepatomegaly               CT Surgery risk assessment: inoperable per Dr. Vargas (porcelin aorta and cane)               Rhythm issues: Afib, no block              PFTs: FEV1 62.3 predicted, DLCO 81%  predicted.              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, cane for gait imbalance     OK for 26 S3 -1cc (15% OS). May need to dilate LCI with 7 x 4 balloon.    The risks, benefits & alternatives of the procedure were explained to the patient.   The risks of TAVR include but are not limited to: Bleeding, infection, heart rhythm abnormalities, allergic reactions, stroke and death.   Informed consent was obtained & the patient is agreeable to proceed with the procedure.        Atrial fibrillation, chronic              [id number] score 7             HASBLED score of 4 with a 8.9% risk of bleeding             Currently on Coumadin / Plavix              Interested in Watchman device       Kaylin Bello MD  Interventional Cardiovascular Fellow, PGY7  Pager: [phone]

## 2019-11-05 NOTE — DISCHARGE SUMMARY
Discharge Summary  Interventional Cardiology      Admit Date: 11/5/2019    Discharge Date:  11/5/2019    Attending Physician: Jack Capone MD    Discharge Physician: Kaylin Bello MD    Principal Diagnoses: <principal problem not specified>  Indication for Admission: Left heart cath (Right)    Discharged Condition: Good    Hospital Course:   Patient presented for outpatient [facility] which went without complication.  70% tubular long mid LAD lesion with small distal LAD vessel, plan to treat medically giving that PCI may risk jailing a good size Diagonal branch. Please see full catheterization report.    Outpatient Plan:    Patient will undergo TAVR in 2 weeks.  Will start aspirin after TAVR  Iodine prep was reordered for the patient prior to her TAVR.  May resume Eliquis tomorrow.  Hold Eliquis 5 days prior to TAVR.    Diet: Cardiac diet    Activity: Ad ramirez    Disposition: Home or Self Care    Discharge Medications:      Medication List      ASK your doctor about these medications    * VENTOLIN HFA 90 mcg/actuation inhaler  Generic drug:  albuterol     * albuterol 1.25 mg/3 mL Nebu  Commonly known as:  ACCUNEB  Take scheduled q4 for the next two days while at home then resume prn dosing     apixaban 5 mg Tab  Commonly known as:  ELIQUIS  Take 1 tablet (5 mg total) by mouth 2 (two) times daily.     aspirin 81 MG EC tablet  Commonly known as:  ECOTRIN     azelastine 137 mcg (0.1 %) nasal spray  Commonly known as:  ASTELIN     bisacodyl 5 mg EC tablet  Commonly known as:  DULCOLAX     calcium carbonate 600 mg calcium (1,500 mg) Tab  Commonly known as:  OS-MICHAEL     cimetidine 200 MG tablet  Commonly known as:  TAGAMET     clopidogrel 75 mg tablet  Commonly known as:  PLAVIX  Take 1 tablet (75 mg total) by mouth once daily.     digoxin 125 mcg tablet  Commonly known as:  LANOXIN  Take 1 tablet (125 mcg total) by mouth once daily.     diltiaZEM HCl 240 mg 24 hr tablet  Commonly known as:  CARDIZEM LA  Take 1 tablet (240  mg total) by mouth once daily.     diphenhydrAMINE 25 mg capsule  Commonly known as:  BENADRYL     DULoxetine 20 MG capsule  Commonly known as:  CYMBALTA  Take 1 capsule (20 mg total) by mouth 2 (two) times daily.     fexofenadine 60 MG tablet  Commonly known as:  ALLEGRA     fish oil-omega-3 fatty acids 300-1,000 mg capsule     fluticasone propionate 50 mcg/actuation nasal spray  Commonly known as:  FLONASE     furosemide 40 MG tablet  Commonly known as:  LASIX  Take 1 tablet (40 mg total) by mouth once daily.     gabapentin 300 MG capsule  Commonly known as:  NEURONTIN  Take 2 capsules (600 mg total) by mouth 3 (three) times daily.     lisinopril 20 MG tablet  Commonly known as:  PRINIVIL,ZESTRIL  Take 1 tablet (20 mg total) by mouth 2 (two) times daily.     LORazepam 0.5 MG tablet  Commonly known as:  ATIVAN     magnesium 250 mg Tab     metFORMIN 500 MG tablet  Commonly known as:  GLUCOPHAGE     nystatin powder  Commonly known as:  MYCOSTATIN  Apply topically 4 (four) times daily.     ondansetron 4 MG Tbdl  Commonly known as:  ZOFRAN-ODT     oxybutynin 5 MG Tr24  Commonly known as:  DITROPAN-XL     pantoprazole 40 MG tablet  Commonly known as:  PROTONIX  Take 1 tablet (40 mg total) by mouth once daily.     potassium gluconate 550 mg (90 mg) Tab     predniSONE 50 MG Tab  Commonly known as:  DELTASONE     REFRESH DRY EYE THERAPY OPHT     SYMBICORT 160-4.5 mcg/actuation Hfaa  Generic drug:  budesonide-formoterol 160-4.5 mcg     traMADol 50 mg tablet  Commonly known as:  ULTRAM  Take 1 tablet (50 mg total) by mouth every 6 (six) hours as needed for Pain.     TYLENOL ARTHRITIS 650 MG Tbsr  Generic drug:  acetaminophen     VITAMIN B-12 1000 MCG tablet  Generic drug:  cyanocobalamin     VITAMIN C 500 MG tablet  Generic drug:  ascorbic acid (vitamin C)     zafirlukast 20 MG tablet  Commonly known as:  ACCOLATE         * This list has 2 medication(s) that are the same as other medications prescribed for you. Read the  directions carefully, and ask your doctor or other care provider to review them with you.              Follow Up:    With Dr. Capone as scheduled.    Kaylin Bello MD  Interventional Cardiovascular Fellow  Pager: [phone]

## 2019-11-05 NOTE — NURSING
Ambulated in hallway with canfunmilayo and RN.  Sob noted.  Returned to stretcher.  No bleed or hematoma observed.  Family at bedside.

## 2019-11-05 NOTE — Clinical Note
70 ml injected throughout the case. 130 mL total wasted during the case. 200 mL total used in the case.

## 2019-11-18 ENCOUNTER — ANESTHESIA EVENT (OUTPATIENT)
Dept: MEDSURG UNIT | Facility: HOSPITAL | Age: 74
DRG: 266 | End: 2019-11-18
Payer: MEDICARE

## 2019-11-19 ENCOUNTER — HOSPITAL ENCOUNTER (INPATIENT)
Facility: HOSPITAL | Age: 74
LOS: 1 days | Discharge: HOME | DRG: 266 | End: 2019-11-20
Attending: INTERNAL MEDICINE | Admitting: INTERNAL MEDICINE
Payer: MEDICARE

## 2019-11-19 ENCOUNTER — ANESTHESIA (OUTPATIENT)
Dept: MEDSURG UNIT | Facility: HOSPITAL | Age: 74
DRG: 266 | End: 2019-11-19
Payer: MEDICARE

## 2019-11-19 DIAGNOSIS — I35.0 SEVERE AORTIC STENOSIS: Primary | ICD-10-CM

## 2019-11-19 DIAGNOSIS — I48.20 ATRIAL FIBRILLATION, CHRONIC: ICD-10-CM

## 2019-11-19 DIAGNOSIS — Z95.2 S/P TAVR (TRANSCATHETER AORTIC VALVE REPLACEMENT): ICD-10-CM

## 2019-11-19 PROBLEM — Z95.3 S/P TAVR (TRANSCATHETER AORTIC VALVE REPLACEMENT): Status: ACTIVE | Noted: 2019-11-19

## 2019-11-19 LAB
ABO + RH BLD: NORMAL
ANION GAP SERPL CALC-SCNC: 10 MMOL/L (ref 8–16)
APTT BLDCRRT: 28 SEC (ref 21–32)
BLD GP AB SCN CELLS X3 SERPL QL: NORMAL
BUN SERPL-MCNC: 14 MG/DL (ref 8–23)
CALCIUM SERPL-MCNC: 9.4 MG/DL (ref 8.7–10.5)
CHLORIDE SERPL-SCNC: 99 MMOL/L (ref 95–110)
CO2 SERPL-SCNC: 28 MMOL/L (ref 23–29)
CREAT SERPL-MCNC: 0.8 MG/DL (ref 0.5–1.4)
ERYTHROCYTE [DISTWIDTH] IN BLOOD BY AUTOMATED COUNT: 14 % (ref 11.5–14.5)
EST. GFR  (AFRICAN AMERICAN): >60 ML/MIN/1.73 M^2
EST. GFR  (NON AFRICAN AMERICAN): >60 ML/MIN/1.73 M^2
GLUCOSE SERPL-MCNC: 186 MG/DL (ref 70–110)
HCT VFR BLD AUTO: 39.3 % (ref 37–48.5)
HGB BLD-MCNC: 12.7 G/DL (ref 12–16)
INR PPP: 1 (ref 0.8–1.2)
MCH RBC QN AUTO: 31.3 PG (ref 27–31)
MCHC RBC AUTO-ENTMCNC: 32.3 G/DL (ref 32–36)
MCV RBC AUTO: 97 FL (ref 82–98)
PLATELET # BLD AUTO: 318 K/UL (ref 150–350)
PMV BLD AUTO: 10.3 FL (ref 9.2–12.9)
POCT GLUCOSE: 170 MG/DL (ref 70–110)
POCT GLUCOSE: 173 MG/DL (ref 70–110)
POTASSIUM SERPL-SCNC: 4 MMOL/L (ref 3.5–5.1)
PROTHROMBIN TIME: 10.2 SEC (ref 9–12.5)
RBC # BLD AUTO: 4.06 M/UL (ref 4–5.4)
SODIUM SERPL-SCNC: 137 MMOL/L (ref 136–145)
WBC # BLD AUTO: 8.17 K/UL (ref 3.9–12.7)

## 2019-11-19 PROCEDURE — 27000239 HC STAND-BY BYPASS PUMP

## 2019-11-19 PROCEDURE — 27201423 OPTIME MED/SURG SUP & DEVICES STERILE SUPPLY: Performed by: INTERNAL MEDICINE

## 2019-11-19 PROCEDURE — 36620 PR INSERT CATH,ART,PERCUT,SHORTTERM: ICD-10-PCS | Mod: 59,,, | Performed by: ANESTHESIOLOGY

## 2019-11-19 PROCEDURE — 93010 ELECTROCARDIOGRAM REPORT: CPT | Mod: 59,,, | Performed by: INTERNAL MEDICINE

## 2019-11-19 PROCEDURE — 63600175 PHARM REV CODE 636 W HCPCS: Performed by: STUDENT IN AN ORGANIZED HEALTH CARE EDUCATION/TRAINING PROGRAM

## 2019-11-19 PROCEDURE — 99223 PR INITIAL HOSPITAL CARE,LEVL III: ICD-10-PCS | Mod: ,,, | Performed by: INTERNAL MEDICINE

## 2019-11-19 PROCEDURE — 25000003 PHARM REV CODE 250: Performed by: STUDENT IN AN ORGANIZED HEALTH CARE EDUCATION/TRAINING PROGRAM

## 2019-11-19 PROCEDURE — 80048 BASIC METABOLIC PNL TOTAL CA: CPT

## 2019-11-19 PROCEDURE — D9220A PRA ANESTHESIA: ICD-10-PCS | Mod: ,,, | Performed by: ANESTHESIOLOGY

## 2019-11-19 PROCEDURE — D9220A PRA ANESTHESIA: Mod: ,,, | Performed by: ANESTHESIOLOGY

## 2019-11-19 PROCEDURE — 33361 PR TAVR, PERCUTANEOUS FEMORAL: ICD-10-PCS | Mod: 62,,, | Performed by: THORACIC SURGERY (CARDIOTHORACIC VASCULAR SURGERY)

## 2019-11-19 PROCEDURE — A4216 STERILE WATER/SALINE, 10 ML: HCPCS | Performed by: STUDENT IN AN ORGANIZED HEALTH CARE EDUCATION/TRAINING PROGRAM

## 2019-11-19 PROCEDURE — 33361 PR TAVR, PERCUTANEOUS FEMORAL: ICD-10-PCS | Mod: 62,,, | Performed by: INTERNAL MEDICINE

## 2019-11-19 PROCEDURE — 85730 THROMBOPLASTIN TIME PARTIAL: CPT

## 2019-11-19 PROCEDURE — 63600175 PHARM REV CODE 636 W HCPCS: Performed by: INTERNAL MEDICINE

## 2019-11-19 PROCEDURE — 27000191 HC C-V MONITORING

## 2019-11-19 PROCEDURE — 27800903 OPTIME MED/SURG SUP & DEVICES OTHER IMPLANTS: Performed by: INTERNAL MEDICINE

## 2019-11-19 PROCEDURE — 82962 GLUCOSE BLOOD TEST: CPT

## 2019-11-19 PROCEDURE — 85610 PROTHROMBIN TIME: CPT

## 2019-11-19 PROCEDURE — 93005 ELECTROCARDIOGRAM TRACING: CPT

## 2019-11-19 PROCEDURE — 33210 PR INSER HEART TEMP PACER ONE CHMBR: ICD-10-PCS | Mod: 59,,, | Performed by: ANESTHESIOLOGY

## 2019-11-19 PROCEDURE — 33361 REPLACE AORTIC VALVE PERQ: CPT | Performed by: INTERNAL MEDICINE

## 2019-11-19 PROCEDURE — 93010 ELECTROCARDIOGRAM REPORT: CPT | Mod: ,,, | Performed by: INTERNAL MEDICINE

## 2019-11-19 PROCEDURE — 33361 REPLACE AORTIC VALVE PERQ: CPT | Mod: 62,,, | Performed by: INTERNAL MEDICINE

## 2019-11-19 PROCEDURE — S0077 INJECTION, CLINDAMYCIN PHOSP: HCPCS | Performed by: STUDENT IN AN ORGANIZED HEALTH CARE EDUCATION/TRAINING PROGRAM

## 2019-11-19 PROCEDURE — 20000000 HC ICU ROOM

## 2019-11-19 PROCEDURE — 33210 INSERT ELECTRD/PM CATH SNGL: CPT | Mod: 59,,, | Performed by: ANESTHESIOLOGY

## 2019-11-19 PROCEDURE — 99223 1ST HOSP IP/OBS HIGH 75: CPT | Mod: ,,, | Performed by: INTERNAL MEDICINE

## 2019-11-19 PROCEDURE — 86850 RBC ANTIBODY SCREEN: CPT

## 2019-11-19 PROCEDURE — 25000003 PHARM REV CODE 250: Performed by: INTERNAL MEDICINE

## 2019-11-19 PROCEDURE — 37000009 HC ANESTHESIA EA ADD 15 MINS: Performed by: INTERNAL MEDICINE

## 2019-11-19 PROCEDURE — C1894 INTRO/SHEATH, NON-LASER: HCPCS | Performed by: INTERNAL MEDICINE

## 2019-11-19 PROCEDURE — 93010 EKG 12-LEAD: ICD-10-PCS | Mod: ,,, | Performed by: INTERNAL MEDICINE

## 2019-11-19 PROCEDURE — 85027 COMPLETE CBC AUTOMATED: CPT

## 2019-11-19 PROCEDURE — 33361 REPLACE AORTIC VALVE PERQ: CPT | Mod: 62,,, | Performed by: THORACIC SURGERY (CARDIOTHORACIC VASCULAR SURGERY)

## 2019-11-19 PROCEDURE — 36620 INSERTION CATHETER ARTERY: CPT | Mod: 59,,, | Performed by: ANESTHESIOLOGY

## 2019-11-19 PROCEDURE — C1769 GUIDE WIRE: HCPCS | Performed by: INTERNAL MEDICINE

## 2019-11-19 PROCEDURE — C1725 CATH, TRANSLUMIN NON-LASER: HCPCS | Performed by: INTERNAL MEDICINE

## 2019-11-19 PROCEDURE — C1760 CLOSURE DEV, VASC: HCPCS | Performed by: INTERNAL MEDICINE

## 2019-11-19 PROCEDURE — 25500020 PHARM REV CODE 255: Performed by: INTERNAL MEDICINE

## 2019-11-19 PROCEDURE — 37000008 HC ANESTHESIA 1ST 15 MINUTES: Performed by: INTERNAL MEDICINE

## 2019-11-19 PROCEDURE — 94761 N-INVAS EAR/PLS OXIMETRY MLT: CPT

## 2019-11-19 DEVICE — KIT SAPIEN 26MM DELIVERY: Type: IMPLANTABLE DEVICE | Site: HEART | Status: FUNCTIONAL

## 2019-11-19 RX ORDER — CLINDAMYCIN PHOSPHATE 900 MG/50ML
INJECTION, SOLUTION INTRAVENOUS
Status: DISCONTINUED | OUTPATIENT
Start: 2019-11-19 | End: 2019-11-19

## 2019-11-19 RX ORDER — GABAPENTIN 300 MG/1
300 CAPSULE ORAL 2 TIMES DAILY
Status: DISCONTINUED | OUTPATIENT
Start: 2019-11-19 | End: 2019-11-20 | Stop reason: HOSPADM

## 2019-11-19 RX ORDER — SODIUM CHLORIDE 9 MG/ML
INJECTION, SOLUTION INTRAVENOUS CONTINUOUS PRN
Status: DISCONTINUED | OUTPATIENT
Start: 2019-11-19 | End: 2019-11-19

## 2019-11-19 RX ORDER — IBUPROFEN 200 MG
24 TABLET ORAL
Status: DISCONTINUED | OUTPATIENT
Start: 2019-11-19 | End: 2019-11-20 | Stop reason: HOSPADM

## 2019-11-19 RX ORDER — LANOLIN ALCOHOL/MO/W.PET/CERES
800 CREAM (GRAM) TOPICAL
Status: DISCONTINUED | OUTPATIENT
Start: 2019-11-19 | End: 2019-11-20 | Stop reason: HOSPADM

## 2019-11-19 RX ORDER — FENTANYL CITRATE 50 UG/ML
INJECTION, SOLUTION INTRAMUSCULAR; INTRAVENOUS
Status: DISCONTINUED | OUTPATIENT
Start: 2019-11-19 | End: 2019-11-19

## 2019-11-19 RX ORDER — CLOPIDOGREL BISULFATE 75 MG/1
75 TABLET ORAL DAILY
COMMUNITY
End: 2020-01-13 | Stop reason: SDUPTHER

## 2019-11-19 RX ORDER — DIGOXIN 125 MCG
0.12 TABLET ORAL DAILY
Status: DISCONTINUED | OUTPATIENT
Start: 2019-11-20 | End: 2019-11-20 | Stop reason: HOSPADM

## 2019-11-19 RX ORDER — POTASSIUM CHLORIDE 20 MEQ/15ML
40 SOLUTION ORAL
Status: DISCONTINUED | OUTPATIENT
Start: 2019-11-19 | End: 2019-11-20 | Stop reason: HOSPADM

## 2019-11-19 RX ORDER — HEPARIN SODIUM 1000 [USP'U]/ML
INJECTION, SOLUTION INTRAVENOUS; SUBCUTANEOUS
Status: DISCONTINUED | OUTPATIENT
Start: 2019-11-19 | End: 2019-11-19

## 2019-11-19 RX ORDER — ASPIRIN 81 MG/1
81 TABLET ORAL DAILY
Status: ON HOLD | COMMUNITY
End: 2019-11-20 | Stop reason: HOSPADM

## 2019-11-19 RX ORDER — PROTAMINE SULFATE 10 MG/ML
INJECTION, SOLUTION INTRAVENOUS
Status: DISCONTINUED | OUTPATIENT
Start: 2019-11-19 | End: 2019-11-19

## 2019-11-19 RX ORDER — LABETALOL HCL 20 MG/4 ML
10 SYRINGE (ML) INTRAVENOUS EVERY 4 HOURS PRN
Status: DISCONTINUED | OUTPATIENT
Start: 2019-11-19 | End: 2019-11-20 | Stop reason: HOSPADM

## 2019-11-19 RX ORDER — SODIUM,POTASSIUM PHOSPHATES 280-250MG
2 POWDER IN PACKET (EA) ORAL
Status: DISCONTINUED | OUTPATIENT
Start: 2019-11-19 | End: 2019-11-20 | Stop reason: HOSPADM

## 2019-11-19 RX ORDER — LIDOCAINE HCL/PF 100 MG/5ML
SYRINGE (ML) INTRAVENOUS
Status: DISCONTINUED | OUTPATIENT
Start: 2019-11-19 | End: 2019-11-19

## 2019-11-19 RX ORDER — PROPOFOL 10 MG/ML
VIAL (ML) INTRAVENOUS
Status: DISCONTINUED | OUTPATIENT
Start: 2019-11-19 | End: 2019-11-19

## 2019-11-19 RX ORDER — GLUCAGON 1 MG
1 KIT INJECTION
Status: DISCONTINUED | OUTPATIENT
Start: 2019-11-19 | End: 2019-11-20 | Stop reason: HOSPADM

## 2019-11-19 RX ORDER — HEPARIN SODIUM 200 [USP'U]/100ML
INJECTION, SOLUTION INTRAVENOUS
Status: DISCONTINUED | OUTPATIENT
Start: 2019-11-19 | End: 2019-11-20 | Stop reason: HOSPADM

## 2019-11-19 RX ORDER — ONDANSETRON 2 MG/ML
4 INJECTION INTRAMUSCULAR; INTRAVENOUS EVERY 12 HOURS PRN
Status: DISCONTINUED | OUTPATIENT
Start: 2019-11-19 | End: 2019-11-20 | Stop reason: HOSPADM

## 2019-11-19 RX ORDER — SODIUM CHLORIDE 9 MG/ML
INJECTION, SOLUTION INTRAVENOUS CONTINUOUS
Status: DISCONTINUED | OUTPATIENT
Start: 2019-11-19 | End: 2019-11-20 | Stop reason: HOSPADM

## 2019-11-19 RX ORDER — ALBUTEROL SULFATE 90 UG/1
2 AEROSOL, METERED RESPIRATORY (INHALATION) EVERY 6 HOURS PRN
Status: DISCONTINUED | OUTPATIENT
Start: 2019-11-19 | End: 2019-11-20 | Stop reason: HOSPADM

## 2019-11-19 RX ORDER — IBUPROFEN 200 MG
16 TABLET ORAL
Status: DISCONTINUED | OUTPATIENT
Start: 2019-11-19 | End: 2019-11-20 | Stop reason: HOSPADM

## 2019-11-19 RX ORDER — ASPIRIN 81 MG/1
81 TABLET ORAL DAILY
Status: DISCONTINUED | OUTPATIENT
Start: 2019-11-20 | End: 2019-11-20 | Stop reason: HOSPADM

## 2019-11-19 RX ORDER — GLYCOPYRROLATE 0.2 MG/ML
INJECTION INTRAMUSCULAR; INTRAVENOUS
Status: DISCONTINUED | OUTPATIENT
Start: 2019-11-19 | End: 2019-11-19

## 2019-11-19 RX ORDER — LIDOCAINE HYDROCHLORIDE 20 MG/ML
INJECTION, SOLUTION INFILTRATION; PERINEURAL
Status: DISCONTINUED | OUTPATIENT
Start: 2019-11-19 | End: 2019-11-19 | Stop reason: HOSPADM

## 2019-11-19 RX ORDER — PANTOPRAZOLE SODIUM 40 MG/1
40 TABLET, DELAYED RELEASE ORAL DAILY
Status: DISCONTINUED | OUTPATIENT
Start: 2019-11-20 | End: 2019-11-20 | Stop reason: HOSPADM

## 2019-11-19 RX ORDER — SODIUM CHLORIDE 9 MG/ML
INJECTION, SOLUTION INTRAVENOUS
Status: DISCONTINUED | OUTPATIENT
Start: 2019-11-19 | End: 2019-11-20 | Stop reason: HOSPADM

## 2019-11-19 RX ORDER — NOREPINEPHRINE BITARTRATE/D5W 4MG/250ML
0.02 PLASTIC BAG, INJECTION (ML) INTRAVENOUS CONTINUOUS
Status: DISCONTINUED | OUTPATIENT
Start: 2019-11-19 | End: 2019-11-20 | Stop reason: HOSPADM

## 2019-11-19 RX ORDER — DIPHENHYDRAMINE HCL 50 MG
50 CAPSULE ORAL ONCE
Status: COMPLETED | OUTPATIENT
Start: 2019-11-19 | End: 2019-11-19

## 2019-11-19 RX ORDER — ACETAMINOPHEN 325 MG/1
650 TABLET ORAL EVERY 4 HOURS PRN
Status: DISCONTINUED | OUTPATIENT
Start: 2019-11-19 | End: 2019-11-20 | Stop reason: HOSPADM

## 2019-11-19 RX ORDER — INSULIN ASPART 100 [IU]/ML
0-5 INJECTION, SOLUTION INTRAVENOUS; SUBCUTANEOUS
Status: DISCONTINUED | OUTPATIENT
Start: 2019-11-19 | End: 2019-11-20 | Stop reason: HOSPADM

## 2019-11-19 RX ORDER — SODIUM CHLORIDE 9 MG/ML
INJECTION, SOLUTION INTRAVENOUS CONTINUOUS
Status: ACTIVE | OUTPATIENT
Start: 2019-11-19 | End: 2019-11-19

## 2019-11-19 RX ADMIN — SODIUM CHLORIDE: 0.9 INJECTION, SOLUTION INTRAVENOUS at 12:11

## 2019-11-19 RX ADMIN — PROPOFOL 10 MG: 10 INJECTION, EMULSION INTRAVENOUS at 02:11

## 2019-11-19 RX ADMIN — PROPOFOL 10 MG: 10 INJECTION, EMULSION INTRAVENOUS at 01:11

## 2019-11-19 RX ADMIN — FENTANYL CITRATE 50 MCG: 50 INJECTION, SOLUTION INTRAMUSCULAR; INTRAVENOUS at 01:11

## 2019-11-19 RX ADMIN — SODIUM CHLORIDE: 0.9 INJECTION, SOLUTION INTRAVENOUS at 03:11

## 2019-11-19 RX ADMIN — DEXMEDETOMIDINE HYDROCHLORIDE 1 MCG/KG/HR: 100 INJECTION, SOLUTION, CONCENTRATE INTRAVENOUS at 12:11

## 2019-11-19 RX ADMIN — SODIUM CHLORIDE: 0.9 INJECTION, SOLUTION INTRAVENOUS at 01:11

## 2019-11-19 RX ADMIN — GLYCOPYRROLATE 0.2 MG: 0.2 INJECTION INTRAMUSCULAR; INTRAVENOUS at 01:11

## 2019-11-19 RX ADMIN — DIPHENHYDRAMINE HYDROCHLORIDE 50 MG: 50 CAPSULE ORAL at 11:11

## 2019-11-19 RX ADMIN — LIDOCAINE HYDROCHLORIDE 100 MG: 20 INJECTION, SOLUTION INTRAVENOUS at 02:11

## 2019-11-19 RX ADMIN — GABAPENTIN 300 MG: 300 CAPSULE ORAL at 08:11

## 2019-11-19 RX ADMIN — FENTANYL CITRATE 50 MCG: 50 INJECTION, SOLUTION INTRAMUSCULAR; INTRAVENOUS at 12:11

## 2019-11-19 RX ADMIN — HEPARIN SODIUM 12000 UNITS: 1000 INJECTION INTRAVENOUS; SUBCUTANEOUS at 01:11

## 2019-11-19 RX ADMIN — PROTAMINE SULFATE 100 MG: 10 INJECTION, SOLUTION INTRAVENOUS at 02:11

## 2019-11-19 RX ADMIN — PROPOFOL 10 MG: 10 INJECTION, EMULSION INTRAVENOUS at 12:11

## 2019-11-19 RX ADMIN — CLINDAMYCIN PHOSPHATE 900 MG: 18 INJECTION, SOLUTION INTRAVENOUS at 01:11

## 2019-11-19 NOTE — ANESTHESIA POSTPROCEDURE EVALUATION
Anesthesia Post Evaluation    Patient: Adriana Strong    Procedure(s) Performed: Procedure(s) (LRB):  Replacement-valve-aortic (N/A)    Final Anesthesia Type: general (Natural airway)    Patient location during evaluation: ICU  Patient participation: Yes- Able to Participate  Level of consciousness: awake and alert  Post-procedure vital signs: reviewed and stable  Pain management: adequate  Airway patency: patent    PONV status at discharge: No PONV  Anesthetic complications: no      Cardiovascular status: hemodynamically stable  Respiratory status: unassisted  Hydration status: euvolemic  Follow-up not needed.          Vitals Value Taken Time   /60 11/19/2019 10:57 AM   Temp 36.6 °C (97.9 °F) 11/19/2019 10:54 AM   Pulse 70 11/19/2019  3:12 PM   Resp 13 11/19/2019  3:12 PM   SpO2 89 % 11/19/2019  3:12 PM   Vitals shown include unvalidated device data.      No case tracking events are documented in the log.      Pain/Naresh Score: No data recorded

## 2019-11-19 NOTE — Clinical Note
The site was marked. Prepped: groin and right neck. Prepped with: ChloraPrep. The site was clipped. The patient was draped.

## 2019-11-19 NOTE — ANESTHESIA PROCEDURE NOTES
[facility]    Diagnosis: severe AS  Patient location during procedure: done in OR  Procedure start time: 11/19/2019 1:05 PM  Timeout: 11/19/2019 1:05 PM  Procedure end time: 11/19/2019 1:21 PM    Staffing  Authorizing Provider: Jerald Parham MD  Performing Provider: Drew Nixon MD    Anesthesiologist was present at the time of the procedure.  Preanesthetic Checklist  Completed: patient identified, site marked, surgical consent, pre-op evaluation, timeout performed, IV checked, risks and benefits discussed, monitors and equipment checked and anesthesia consent given  [facility] Line  Skin Prep: chlorhexidine gluconate  Local Infiltration: lidocaine  Location: right,  internal jugular vein  Vessel Caliber: medium, patent, compressibility normal  Coaxial introducer size: gFr single lumen. manometry used.  Device: transvenous pacing catheter  Catheter Size: 6 Fr  Catheter placement by yes. Heme positive aspiration all ports.Sterile sheath usedInsertion Attempts: 1  Indication: intravenous therapy, hemodynamic monitoring, transvenous pacing  Ultrasound Guidance  Needle advanced into vessel with real time Ultrasound guidance.  Guidewire confirmed in vessel.  Sterile sheath used.  Assessment  Central Line Bundle Protocol followed. Hand hygiene before procedure, surgical cap worn, surgical mask worn, sterile surgical gloves worn, large sterile drape used.  Verification: blood return and ultrasound  Dressing: sutured in place and taped  Patient: Tolerated Well

## 2019-11-19 NOTE — ASSESSMENT & PLAN NOTE
- In summary, patient with severe AS s/p TF TAVR. EP consulted for further management of TVP and if PPM is indicated.     Pre-TAVR EKG: AF HR 86 QRS 88  Post-TAVR EKG:  Post-day EKG:       Plan:   - EP team will continue to follow, please call for any questions or concerns   - Any changes to rhythm or acute events on telemetry please obtain EKG    - NPO at MN, for possible PPM placement in AM  - EKG in AM   - Continue TVP overnight  - Routine Post op management per structural team

## 2019-11-19 NOTE — PLAN OF CARE
Pt arrived to unit accompanied by family.  Vss. Oriented pt to rm and unit. Pre op orders and assessment initiated.  Pt remains npo.  Pt in no acute distress and verbalizes no complaints. Will continue to monitor.  Call bell within reach.

## 2019-11-19 NOTE — CONSULTS
[facility]  Cardiac Electrophysiology  Consult Note    Admission Date: 11/19/2019  Code Status: Prior   Attending Provider: Jack Capone MD  Consulting Provider: Patrizia Jefferson MD  Principal Problem:Severe aortic stenosis    Inpatient consult to Electrophysiology  Consult performed by: Patrizia Jefferson MD  Consult ordered by: Shari Reyes MD        Subjective:     Chief Complaint:  S/p TAVR     HPI:   Adriana Strong is a pleasant 74. y.o. 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   She underwent a 26mm Phan S3 TAVR.     Pre TAVR EKG: AF HR 86 QRS 88    Past Medical History:   Diagnosis Date    Anticoagulant long-term use     on Plavix since May 2015    Anxiety     Arthritis     Asthma     Atrial fibrillation     Cataracts, bilateral     Chest pain, atypical     Colon polyp     Coronary artery disease     Diabetes mellitus     Dry eyes     Esophageal erosions     GERD (gastroesophageal reflux disease)     Heart murmur     Hemorrhoid     High cholesterol     Hypertension     Irritable bowel syndrome     Shingles 2015    TIA (transient ischemic attack)     Use of cane as ambulatory aid        Past Surgical History:   Procedure Laterality Date    ABDOMINAL SURGERY      angiocele      2007, 2014    BREAST SURGERY      left--- a lump--- no cancer    COLONOSCOPY  2014    COLONOSCOPY N/A 3/14/2018    Procedure: COLONOSCOPY;  Surgeon: Efren Kemp MD;  Location: [facility] ([address] [place] [address]);  Service: Endoscopy;  Laterality: N/A;  ok to hold Plavix 5 days prior to procedure per Dr RESHMA Hernandez     ok per Dr Kemp to hold Eliquis 2 days prior to procedure (see telephone encounter  dated-2/7/18)    HERNIA REPAIR      HYSTERECTOMY  partial    1982 partial hysterectomy    LEFT HEART CATHETERIZATION Right 11/5/2019    Procedure: Left heart cath;  Surgeon: Jack Capone MD;  Location: [facility] CATH LAB;  Service: Cardiology;  Laterality: Right;    left nasal polyp      left neck lymph node      nose polyp      right hip fatty mass tissue      stent to small intestine      SUPERIOR VENA CAVA ANGIOPLASTY / STENTING      TONSILLECTOMY      TOTAL ABDOMINAL HYSTERECTOMY      2014    TOTAL KNEE ARTHROPLASTY Bilateral     UPPER GASTROINTESTINAL ENDOSCOPY  2014       Review of patient's allergies indicates:   Allergen Reactions    Celebrex [celecoxib] Shortness Of Breath    Ciprofloxacin Swelling     lip    Dicyclomine Hives    Adhesive Dermatitis    Avelox [moxifloxacin] Swelling    Cilostazol Other (See Comments)     Elevates blood pressure    Eryc [erythromycin] Other (See Comments)     unknown    Iodine and iodide containing products Hives    Keflex [cephalexin] Hives    Meclomen      rashes    Meloxicam      Ear ringing    Metoclopramide Other (See Comments)     High blood pressure    Sulfa (sulfonamide antibiotics) Itching       No current facility-administered medications on file prior to encounter.      Current Outpatient Medications on File Prior to Encounter   Medication Sig    albuterol (ACCUNEB) 1.25 mg/3 mL Nebu Take scheduled q4 for the next two days while at home then resume prn dosing    albuterol (VENTOLIN HFA) 90 mcg/actuation inhaler Inhale 2 puffs into the lungs every 6 (six) hours as needed for Wheezing. Rescue    aspirin (ECOTRIN) 81 MG EC tablet Take 81 mg by mouth once daily.    azelastine (ASTELIN) 137 mcg nasal spray 1 spray by Nasal route 2 (two) times daily.    budesonide-formoterol 160-4.5 mcg (SYMBICORT) 160-4.5 mcg/actuation HFAA Inhale 2 puffs into the lungs every 12 (twelve) hours. Controller    cimetidine (TAGAMET) 200 MG tablet Take 300 mg  by mouth.     clopidogrel (PLAVIX) 75 mg tablet Take 75 mg by mouth once daily.    cyanocobalamin (VITAMIN B-12) 1000 MCG tablet Take 100 mcg by mouth once daily.    digoxin (LANOXIN) 125 mcg tablet Take 1 tablet (125 mcg total) by mouth once daily.    diltiaZEM HCl (CARDIZEM LA) 240 mg 24 hr tablet Take 1 tablet (240 mg total) by mouth once daily.    diphenhydrAMINE (BENADRYL) 25 mg capsule Take 50 mg by mouth.     DULoxetine (CYMBALTA) 20 MG capsule Take 1 capsule (20 mg total) by mouth 2 (two) times daily.    famotidine (PEPCID) 20 MG tablet Take 1 tablet (20 mg total) by mouth every 6 (six) hours. for 3 doses    fexofenadine (ALLEGRA) 60 MG tablet Take 60 mg by mouth every morning.    fluticasone (FLONASE) 50 mcg/actuation nasal spray 1 spray by Each Nare route every morning.     furosemide (LASIX) 40 MG tablet Take 1 tablet (40 mg total) by mouth once daily.    gabapentin (NEURONTIN) 300 MG capsule Take 2 capsules (600 mg total) by mouth 3 (three) times daily.    lisinopril (PRINIVIL,ZESTRIL) 20 MG tablet Take 1 tablet (20 mg total) by mouth 2 (two) times daily.    lorazepam (ATIVAN) 0.5 MG tablet Take 0.5 mg by mouth every morning.     oxybutynin (DITROPAN-XL) 5 MG TR24 Take 5 mg by mouth every other day.     pantoprazole (PROTONIX) 40 MG tablet Take 1 tablet (40 mg total) by mouth once daily. (Patient taking differently: Take 40 mg by mouth every morning. )    predniSONE (DELTASONE) 50 MG Tab Take by mouth.     acetaminophen (TYLENOL ARTHRITIS) 650 MG TbSR Take 1,300 mg by mouth 2 (two) times daily.    apixaban (ELIQUIS) 5 mg Tab Take 1 tablet (5 mg total) by mouth 2 (two) times daily.    ascorbic acid (VITAMIN C) 500 MG tablet Take 500 mg by mouth once daily.    bisacodyl (DULCOLAX) 5 mg EC tablet Take 5 mg by mouth daily as needed for Constipation.    calcium carbonate (OS-MICHAEL) 600 mg (1,500 mg) Tab Take 1,200 mg by mouth once daily.    fish oil-omega-3 fatty acids 300-1,000 mg capsule  Take 1 g by mouth once daily.     magnesium 250 mg Tab Take 250 mg by mouth. 3 in am and 3 pm    metFORMIN (GLUCOPHAGE) 500 MG tablet Take 500 mg by mouth 2 (two) times daily.    nystatin (MYCOSTATIN) powder Apply topically 4 (four) times daily.    ondansetron (ZOFRAN-ODT) 4 MG TbDL DISSOLVE 1 TABLET ON TONGUE EVERY 8 HOURS AS NEEDED FOR NAUSEA    POLYSORBATE 80/GLYCERIN (REFRESH DRY EYE THERAPY OPHT) Apply to eye 2 (two) times daily.    potassium gluconate 550 mg (90 mg) Tab Take by mouth. 2 in am and 2 pm    traMADol (ULTRAM) 50 mg tablet Take 1 tablet (50 mg total) by mouth every 6 (six) hours as needed for Pain.    zafirlukast (ACCOLATE) 20 MG tablet Take 20 mg by mouth once daily.     Family History     None        Tobacco Use    Smoking status: Never Smoker    Smokeless tobacco: Never Used   Substance and Sexual Activity    Alcohol use: No    Drug use: No    Sexual activity: Not on file     Review of Systems   Constitution: Negative for chills, decreased appetite and diaphoresis.   HENT: Negative for congestion and ear discharge.    Eyes: Negative for blurred vision and discharge.   Cardiovascular: Negative for chest pain, dyspnea on exertion, irregular heartbeat, leg swelling and paroxysmal nocturnal dyspnea.   Respiratory: Negative for cough, hemoptysis and shortness of breath.    Gastrointestinal: Negative for abdominal pain.     Objective:     Vital Signs (Most Recent):  Temp: 97.9 °F (36.6 °C) (11/19/19 1054)  Pulse: 87 (11/19/19 1054)  Resp: 18 (11/19/19 1054)  BP: 132/60 (11/19/19 1057)  SpO2: (!) 94 % (11/19/19 1054) Vital Signs (24h Range):  Temp:  [97.9 °F (36.6 °C)] 97.9 °F (36.6 °C)  Pulse:  [87] 87  Resp:  [18] 18  SpO2:  [94 %] 94 %  BP: (128-132)/(60-71) 132/60       Weight: 104.3 kg (230 lb)  Body mass index is 39.48 kg/m².    SpO2: (!) 94 %  O2 Device (Oxygen Therapy): room air    Physical Exam   Constitutional: She is oriented to person, place, and time. She appears  well-developed and well-nourished. No distress.   Eyes: Pupils are equal, round, and reactive to light. Conjunctivae are normal.   Neck: No tracheal deviation present. No thyromegaly present.   Cardiovascular: Normal rate, normal heart sounds and intact distal pulses. An irregularly irregular rhythm present. Exam reveals no gallop and no friction rub.   No murmur heard.  Pulses:       Radial pulses are 2+ on the right side, and 2+ on the left side.        Femoral pulses are 2+ on the right side, and 2+ on the left side.  Pulmonary/Chest: Effort normal and breath sounds normal. No respiratory distress. She has no wheezes. She has no rales.   Abdominal: Soft. Bowel sounds are normal. She exhibits no distension. There is no tenderness.   Musculoskeletal: She exhibits no edema or deformity.   Neurological: She is alert and oriented to person, place, and time. No cranial nerve deficit. Coordination normal.   Skin: Skin is warm and dry. She is not diaphoretic.   Psychiatric: She has a normal mood and affect. Her behavior is normal.       Significant Labs:   EP:   Recent Labs   Lab 11/19/19  1033 11/19/19  1139   NA  --  137   K  --  4.0   CL  --  99   CO2  --  28   GLU  --  186*   BUN  --  14   CREATININE  --  0.8   CALCIUM  --  9.4   ANIONGAP  --  10   ESTGFRAFRICA  --  >60.0   EGFRNONAA  --  >60.0   WBC 8.17  --    HGB 12.7  --    HCT 39.3  --      --    INR 1.0  --        Significant Imaging: Echocardiogram:   Transthoracic echo (TTE) complete (Cupid Only):   Results for orders placed or performed during the hospital encounter of 10/22/19   Echo Color Flow Doppler? Yes   Result Value Ref Range    Ascending aorta 3.04 cm    STJ 2.86 cm    AV mean gradient 42 mmHg    Ao peak pati 4.1 m/s    Ao VTI 90.00 cm    IVRT 0.05 msec    IVS 1.06 0.6 - 1.1 cm    LA size 4.55 cm    Left Atrium Major Axis 5.98 cm    Left Atrium Minor Axis 6.03 cm    LVIDD 4.09 3.5 - 6.0 cm    LVIDS 2.97 2.1 - 4.0 cm    LVOT diameter 2.20 cm     LVOT peak VTI 19.00 cm    PW 1.00 0.6 - 1.1 cm    MV Peak E Mazin 1.20 m/s    RA Major Axis 5.22 cm    RA Width 3.81 cm    RVDD 2.78 cm    Sinus 2.64 cm    TAPSE 2.11 cm    TR Max Mazin 2.99 m/s    TDI LATERAL 0.09 m/s    TDI SEPTAL 0.09 m/s    LA WIDTH 4.05 cm    LV Diastolic Volume 73.91 mL    LV Systolic Volume 34.09 mL    RV S' 12.33 cm/s    LVOT peak mazin 0.79 m/s    LV LATERAL E/E' RATIO 13.33 m/s    LV SEPTAL E/E' RATIO 13.33 m/s    FS 27 %    LA volume 94.06 cm3    LV mass 137.21 g    Left Ventricle Relative Wall Thickness 0.49 cm    AV valve area 0.80 cm2    AV Velocity Ratio 0.19     AV index (prosthetic) 0.21     Mean e' 0.09 m/s    LVOT area 3.8 cm2    LVOT stroke volume 72.19 cm3    AV peak gradient 67 mmHg    E/E' ratio 13.33 m/s    Triscuspid Valve Regurgitation Peak Gradient 36 mmHg    BSA 2.18 m2    LV Systolic Volume Index 16.4 mL/m2    LV Diastolic Volume Index 35.48 mL/m2    LA Volume Index 45.1 mL/m2    LV Mass Index 66 g/m2    Right Atrial Pressure (from IVC) 3 mmHg    QEF 62 %    TV rest pulmonary artery pressure 39 mmHg    Narrative    · Normal left ventricular systolic function. The estimated ejection   fraction is 60% (QEF 62%)  · Concentric left ventricular remodeling.  · Biatrial enlargement.  · No wall motion abnormalities.  · Normal right ventricular systolic function.  · Aortic valve area is 0.80 cm2; peak velocity is 4.1 m/s; mean gradient   is 42 mmHg.  · Severe aortic valve stenosis.  · Mild tricuspid regurgitation.  · The estimated PA systolic pressure is 39 mm Hg  · Normal central venous pressure (3 mm Hg).  · Atrial fibrillation observed.                  Assessment and Plan:     S/P TAVR (transcatheter aortic valve replacement)  - In summary, patient with severe AS s/p TF TAVR. EP consulted for further management of TVP and if PPM is indicated.     Pre-TAVR EKG: AF HR 86 QRS 88  Post-TAVR EKG: AF HR 66 QRS 96  40 bpm back up, 1.0 mA threshold on TVP  Post-day EKG:       Plan:   - EP  team will continue to follow, please call for any questions or concerns   - Any changes to rhythm or acute events on telemetry please obtain EKG    - NPO at MN, for possible PPM placement in AM  - EKG in AM   - Continue TVP overnight  - Routine Post op management per structural team          Thank you for your consult. I will follow-up with patient. Please contact us if you have any additional questions.    Patrizia Jefferson MD  Cardiac Electrophysiology  [facility]

## 2019-11-19 NOTE — PLAN OF CARE
CMICU DAILY GOALS       A: Awake    RASS: Goal - RASS Goal: 0-->alert and calm  Actual - RASS ([place] Agitation-Sedation Scale): -1-->drowsy   Restraint necessity:    B: Breath   SBT: Not intubated   C: Coordinate A & B, analgesics/sedatives   Pain: managed    SAT: Not intubated  D: Delirium   CAM-ICU: Overall CAM-ICU: Negative  E: Early Mobility   MOVE Screen: Fail   Activity: Activity Management: bedrest maintained per order  FAS: Feeding/Nutrition   Diet order: Diet/Nutrition Received: NPO,   Fluid restriction:    T: Thrombus   DVT prophylaxis: VTE Required Core Measure: Pharmacological prophylaxis initiated/maintained  H: HOB Elevation   Head of Bed (HOB): HOB flat  U: Ulcer Prophylaxis   GI: no  G: Glucose control   managed Glycemic Management: blood glucose monitoring  S: Skin   Bundle compliance: yes     Date: 11/19/2019 @ home  B: Bowel Function   no issues   I: Indwelling Catheters   Sims necessity:     CVC necessity: Yes   IPAD offered: No  D: De-escalation Antibx   No  Plan for the day   Lie flat for 4 hours post TAVR, up to chair tonight with incentive spirometry.  Family/Goals of care/Code Status   Code Status: Prior     No acute events throughout day, VS and assessment per flow sheet, patient progressing towards goals as tolerated, plan of care reviewed with Adriana Strong and family, all concerns addressed, will continue to monitor.

## 2019-11-19 NOTE — TRANSFER OF CARE
"Anesthesia Transfer of Care Note    Patient: Adriana Strong    Procedure(s) Performed: Procedure(s) (LRB):  Replacement-valve-aortic (N/A)    Patient location: ICU    Anesthesia Type: MAC    Transport from OR: Transported from OR on 6-10 L/min O2 by face mask with adequate spontaneous ventilation    Post pain: adequate analgesia    Post assessment: no apparent anesthetic complications and tolerated procedure well    Post vital signs: stable    Level of consciousness: awake    Nausea/Vomiting: no nausea/vomiting    Complications: none    Transfer of care protocol was followed      Last vitals:   Visit Vitals  /60 (BP Location: Right arm, Patient Position: Lying)   Pulse 87   Temp 36.6 °C (97.9 °F) (Oral)   Resp 18   Ht 5' 4" (1.626 m)   Wt 104.3 kg (230 lb)   LMP 01/21/1973 (Approximate)   SpO2 (!) 94%   Breastfeeding? No   BMI 39.48 kg/m²     "

## 2019-11-19 NOTE — SUBJECTIVE & OBJECTIVE
Past Medical History:   Diagnosis Date    Anticoagulant long-term use     on Plavix since May 2015    Anxiety     Arthritis     Asthma     Atrial fibrillation     Cataracts, bilateral     Chest pain, atypical     Colon polyp     Coronary artery disease     Diabetes mellitus     Dry eyes     Esophageal erosions     GERD (gastroesophageal reflux disease)     Heart murmur     Hemorrhoid     High cholesterol     Hypertension     Irritable bowel syndrome     Shingles 2015    TIA (transient ischemic attack)     Use of cane as ambulatory aid        Past Surgical History:   Procedure Laterality Date    ABDOMINAL SURGERY      angiocele      2007, 2014    BREAST SURGERY      left--- a lump--- no cancer    COLONOSCOPY  2014    COLONOSCOPY N/A 3/14/2018    Procedure: COLONOSCOPY;  Surgeon: Efren Kemp MD;  Location: [facility] ENDO (4TH FLR);  Service: Endoscopy;  Laterality: N/A;  ok to hold Plavix 5 days prior to procedure per Dr RESHMA Hernandez     ok per Dr Kemp to hold Eliquis 2 days prior to procedure (see telephone encounter dated-2/7/18)    HERNIA REPAIR      HYSTERECTOMY  partial    1982 partial hysterectomy    LEFT HEART CATHETERIZATION Right 11/5/2019    Procedure: Left heart cath;  Surgeon: Jack Capone MD;  Location: [facility] CATH LAB;  Service: Cardiology;  Laterality: Right;    left nasal polyp      left neck lymph node      nose polyp      right hip fatty mass tissue      stent to small intestine      SUPERIOR VENA CAVA ANGIOPLASTY / STENTING      TONSILLECTOMY      TOTAL ABDOMINAL HYSTERECTOMY      2014    TOTAL KNEE ARTHROPLASTY Bilateral     UPPER GASTROINTESTINAL ENDOSCOPY  2014       Review of patient's allergies indicates:   Allergen Reactions    Celebrex [celecoxib] Shortness Of Breath    Ciprofloxacin Swelling     lip    Dicyclomine Hives    Adhesive Dermatitis    Avelox [moxifloxacin] Swelling    Cilostazol Other (See Comments)     Elevates blood pressure    Eryc  [erythromycin] Other (See Comments)     unknown    Iodine and iodide containing products Hives    Keflex [cephalexin] Hives    Meclomen      rashes    Meloxicam      Ear ringing    Metoclopramide Other (See Comments)     High blood pressure    Sulfa (sulfonamide antibiotics) Itching       No current facility-administered medications on file prior to encounter.      Current Outpatient Medications on File Prior to Encounter   Medication Sig    albuterol (ACCUNEB) 1.25 mg/3 mL Nebu Take scheduled q4 for the next two days while at home then resume prn dosing    albuterol (VENTOLIN HFA) 90 mcg/actuation inhaler Inhale 2 puffs into the lungs every 6 (six) hours as needed for Wheezing. Rescue    aspirin (ECOTRIN) 81 MG EC tablet Take 81 mg by mouth once daily.    azelastine (ASTELIN) 137 mcg nasal spray 1 spray by Nasal route 2 (two) times daily.    budesonide-formoterol 160-4.5 mcg (SYMBICORT) 160-4.5 mcg/actuation HFAA Inhale 2 puffs into the lungs every 12 (twelve) hours. Controller    cimetidine (TAGAMET) 200 MG tablet Take 300 mg by mouth.     clopidogrel (PLAVIX) 75 mg tablet Take 75 mg by mouth once daily.    cyanocobalamin (VITAMIN B-12) 1000 MCG tablet Take 100 mcg by mouth once daily.    digoxin (LANOXIN) 125 mcg tablet Take 1 tablet (125 mcg total) by mouth once daily.    diltiaZEM HCl (CARDIZEM LA) 240 mg 24 hr tablet Take 1 tablet (240 mg total) by mouth once daily.    diphenhydrAMINE (BENADRYL) 25 mg capsule Take 50 mg by mouth.     DULoxetine (CYMBALTA) 20 MG capsule Take 1 capsule (20 mg total) by mouth 2 (two) times daily.    famotidine (PEPCID) 20 MG tablet Take 1 tablet (20 mg total) by mouth every 6 (six) hours. for 3 doses    fexofenadine (ALLEGRA) 60 MG tablet Take 60 mg by mouth every morning.    fluticasone (FLONASE) 50 mcg/actuation nasal spray 1 spray by Each Nare route every morning.     furosemide (LASIX) 40 MG tablet Take 1 tablet (40 mg total) by mouth once daily.     gabapentin (NEURONTIN) 300 MG capsule Take 2 capsules (600 mg total) by mouth 3 (three) times daily.    lisinopril (PRINIVIL,ZESTRIL) 20 MG tablet Take 1 tablet (20 mg total) by mouth 2 (two) times daily.    lorazepam (ATIVAN) 0.5 MG tablet Take 0.5 mg by mouth every morning.     oxybutynin (DITROPAN-XL) 5 MG TR24 Take 5 mg by mouth every other day.     pantoprazole (PROTONIX) 40 MG tablet Take 1 tablet (40 mg total) by mouth once daily. (Patient taking differently: Take 40 mg by mouth every morning. )    predniSONE (DELTASONE) 50 MG Tab Take by mouth.     acetaminophen (TYLENOL ARTHRITIS) 650 MG TbSR Take 1,300 mg by mouth 2 (two) times daily.    apixaban (ELIQUIS) 5 mg Tab Take 1 tablet (5 mg total) by mouth 2 (two) times daily.    ascorbic acid (VITAMIN C) 500 MG tablet Take 500 mg by mouth once daily.    bisacodyl (DULCOLAX) 5 mg EC tablet Take 5 mg by mouth daily as needed for Constipation.    calcium carbonate (OS-MICHAEL) 600 mg (1,500 mg) Tab Take 1,200 mg by mouth once daily.    fish oil-omega-3 fatty acids 300-1,000 mg capsule Take 1 g by mouth once daily.     magnesium 250 mg Tab Take 250 mg by mouth. 3 in am and 3 pm    metFORMIN (GLUCOPHAGE) 500 MG tablet Take 500 mg by mouth 2 (two) times daily.    nystatin (MYCOSTATIN) powder Apply topically 4 (four) times daily.    ondansetron (ZOFRAN-ODT) 4 MG TbDL DISSOLVE 1 TABLET ON TONGUE EVERY 8 HOURS AS NEEDED FOR NAUSEA    POLYSORBATE 80/GLYCERIN (REFRESH DRY EYE THERAPY OPHT) Apply to eye 2 (two) times daily.    potassium gluconate 550 mg (90 mg) Tab Take by mouth. 2 in am and 2 pm    traMADol (ULTRAM) 50 mg tablet Take 1 tablet (50 mg total) by mouth every 6 (six) hours as needed for Pain.    zafirlukast (ACCOLATE) 20 MG tablet Take 20 mg by mouth once daily.     Family History     None        Tobacco Use    Smoking status: Never Smoker    Smokeless tobacco: Never Used   Substance and Sexual Activity    Alcohol use: No    Drug use: No     Sexual activity: Not on file     Review of Systems   Constitution: Negative for chills, decreased appetite and diaphoresis.   HENT: Negative for congestion and ear discharge.    Eyes: Negative for blurred vision and discharge.   Cardiovascular: Negative for chest pain, dyspnea on exertion, irregular heartbeat, leg swelling and paroxysmal nocturnal dyspnea.   Respiratory: Negative for cough, hemoptysis and shortness of breath.    Gastrointestinal: Negative for abdominal pain.     Objective:     Vital Signs (Most Recent):  Temp: 97.9 °F (36.6 °C) (11/19/19 1054)  Pulse: 87 (11/19/19 1054)  Resp: 18 (11/19/19 1054)  BP: 132/60 (11/19/19 1057)  SpO2: (!) 94 % (11/19/19 1054) Vital Signs (24h Range):  Temp:  [97.9 °F (36.6 °C)] 97.9 °F (36.6 °C)  Pulse:  [87] 87  Resp:  [18] 18  SpO2:  [94 %] 94 %  BP: (128-132)/(60-71) 132/60       Weight: 104.3 kg (230 lb)  Body mass index is 39.48 kg/m².    SpO2: (!) 94 %  O2 Device (Oxygen Therapy): room air    Physical Exam   Constitutional: She is oriented to person, place, and time. She appears well-developed and well-nourished. No distress.   Eyes: Pupils are equal, round, and reactive to light. Conjunctivae are normal.   Neck: No tracheal deviation present. No thyromegaly present.   Cardiovascular: Normal rate, normal heart sounds and intact distal pulses. An irregularly irregular rhythm present. Exam reveals no gallop and no friction rub.   No murmur heard.  Pulses:       Radial pulses are 2+ on the right side, and 2+ on the left side.        Femoral pulses are 2+ on the right side, and 2+ on the left side.  Pulmonary/Chest: Effort normal and breath sounds normal. No respiratory distress. She has no wheezes. She has no rales.   Abdominal: Soft. Bowel sounds are normal. She exhibits no distension. There is no tenderness.   Musculoskeletal: She exhibits no edema or deformity.   Neurological: She is alert and oriented to person, place, and time. No cranial nerve deficit.  Coordination normal.   Skin: Skin is warm and dry. She is not diaphoretic.   Psychiatric: She has a normal mood and affect. Her behavior is normal.       Significant Labs:   EP:   Recent Labs   Lab 11/19/19  1033 11/19/19  1139   NA  --  137   K  --  4.0   CL  --  99   CO2  --  28   GLU  --  186*   BUN  --  14   CREATININE  --  0.8   CALCIUM  --  9.4   ANIONGAP  --  10   ESTGFRAFRICA  --  >60.0   EGFRNONAA  --  >60.0   WBC 8.17  --    HGB 12.7  --    HCT 39.3  --      --    INR 1.0  --        Significant Imaging: Echocardiogram:   Transthoracic echo (TTE) complete (Cupid Only):   Results for orders placed or performed during the hospital encounter of 10/22/19   Echo Color Flow Doppler? Yes   Result Value Ref Range    Ascending aorta 3.04 cm    STJ 2.86 cm    AV mean gradient 42 mmHg    Ao peak mazin 4.1 m/s    Ao VTI 90.00 cm    IVRT 0.05 msec    IVS 1.06 0.6 - 1.1 cm    LA size 4.55 cm    Left Atrium Major Axis 5.98 cm    Left Atrium Minor Axis 6.03 cm    LVIDD 4.09 3.5 - 6.0 cm    LVIDS 2.97 2.1 - 4.0 cm    LVOT diameter 2.20 cm    LVOT peak VTI 19.00 cm    PW 1.00 0.6 - 1.1 cm    MV Peak E Mazin 1.20 m/s    RA Major Axis 5.22 cm    RA Width 3.81 cm    RVDD 2.78 cm    Sinus 2.64 cm    TAPSE 2.11 cm    TR Max Mazin 2.99 m/s    TDI LATERAL 0.09 m/s    TDI SEPTAL 0.09 m/s    LA WIDTH 4.05 cm    LV Diastolic Volume 73.91 mL    LV Systolic Volume 34.09 mL    RV S' 12.33 cm/s    LVOT peak mazin 0.79 m/s    LV LATERAL E/E' RATIO 13.33 m/s    LV SEPTAL E/E' RATIO 13.33 m/s    FS 27 %    LA volume 94.06 cm3    LV mass 137.21 g    Left Ventricle Relative Wall Thickness 0.49 cm    AV valve area 0.80 cm2    AV Velocity Ratio 0.19     AV index (prosthetic) 0.21     Mean e' 0.09 m/s    LVOT area 3.8 cm2    LVOT stroke volume 72.19 cm3    AV peak gradient 67 mmHg    E/E' ratio 13.33 m/s    Triscuspid Valve Regurgitation Peak Gradient 36 mmHg    BSA 2.18 m2    LV Systolic Volume Index 16.4 mL/m2    LV Diastolic Volume Index 35.48  mL/m2    LA Volume Index 45.1 mL/m2    LV Mass Index 66 g/m2    Right Atrial Pressure (from IVC) 3 mmHg    QEF 62 %    TV rest pulmonary artery pressure 39 mmHg    Narrative    · Normal left ventricular systolic function. The estimated ejection   fraction is 60% (QEF 62%)  · Concentric left ventricular remodeling.  · Biatrial enlargement.  · No wall motion abnormalities.  · Normal right ventricular systolic function.  · Aortic valve area is 0.80 cm2; peak velocity is 4.1 m/s; mean gradient   is 42 mmHg.  · Severe aortic valve stenosis.  · Mild tricuspid regurgitation.  · The estimated PA systolic pressure is 39 mm Hg  · Normal central venous pressure (3 mm Hg).  · Atrial fibrillation observed.

## 2019-11-19 NOTE — INTERVAL H&P NOTE
The patient has been examined and the H&P has been reviewed:    I concur with the findings and changes have been noted since the H&P was written: see below.    Severe symptomatic AS (NYHA class III, CCS class III):     The patient has undergone the following TAVR work-up:               ECHO (Date 10/22/19): MISTY 0.80 cm2, peak aortic jet velocity 4.1m/s, MG 42 mmHg, LVEF: 60 %              LHC (11/5/2019 by Dr Capone)- 70% tubular long mid LAD lesion with small distal LAD vessel, plan to treat medically giving that PCI may risk jailing a good size Diagonal branch.              STS: 3.9%               Frailty: 2/4 (Walk / handgrip)               Iliacs are >6.92 on R and >7.99 on L              LVOT area: LVOT 20.8mm, Distance 26.7mm x 21.8mm, Area 4.47cm. Avg diameter 23.9mm              Incidental findings on CT: R pulmonary nodule, hepatomegaly               CT Surgery risk assessment: inoperable per Dr. Vargas (porcelin aorta and functional status)               Rhythm issues: Afib, no block              PFTs: FEV1 62.3 predicted, DLCO 81%  predicted.              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, cane for gait imbalance       A/P:  1. Valve: 26 S3 -1cc (15% OS).   2. TAVR access: LTF, may need to dilate LCI with 7x4 balloon.  3. Valvuloplasty balloon: 22 True  4. Viabahn size if needed: 9x5  5. Antithrombotic therapy: ASA / Eliquis  6. Pacemaker risk factors: The patient was explained that the procedure caries around a 12.5% risk of permanent pacemaker requirement and that risk depends on the patient's underlying conduction system.   7. Patient was educated about the pathophysiology and natural history of severe aortic stenosis and was educated about the treatment options including surgical and transcatheter valve replacement. He agrees to be full code for the foresable future and to undergo workup and treatment of severe AS.   8. The risks, benefits, and alternatives of transcatheter aortic  valve replacement were discussed with the patient. All questions were answered and informed consent was obtained. I had a detailed discussion with the patient regarding risk of stroke, MI, bleeding access site complications including limb loss, allergy, kidney failure including dialysis and death.   9. The patient understands the risks and benefits and wishes to go ahead with the procedure.   10. All patient's questions were answered.     Anesthesia/Surgery risks, benefits and alternative options discussed and understood by patient/family.    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

## 2019-11-19 NOTE — ANESTHESIA PROCEDURE NOTES
Arterial    Diagnosis: severe AS    Patient location during procedure: done in OR  Procedure start time: 11/19/2019 12:59 PM  Timeout: 11/19/2019 12:59 PM  Procedure end time: 11/19/2019 1:02 PM    Staffing  Authorizing Provider: Jerald Parham MD  Performing Provider: Drew Nixon MD    Anesthesiologist was present at the time of the procedure.    Preanesthetic Checklist  Completed: patient identified, site marked, surgical consent, pre-op evaluation, timeout performed, IV checked, risks and benefits discussed, monitors and equipment checked and anesthesia consent givenArterial  Skin Prep: chlorhexidine gluconate  Local Infiltration: lidocaine  Orientation: right  Location: radial  Catheter Size: 20 G  Catheter placement by Ultrasound guidance. Heme positive aspiration all ports.  Vessel Caliber: small, patent, compressibility normal  Needle advanced into vessel with real time Ultrasound guidance.  Sterile sheath used.Insertion Attempts: 1  Assessment  Dressing: secured with tape and tegaderm  Patient: Tolerated well

## 2019-11-19 NOTE — ANESTHESIA PREPROCEDURE EVALUATION
11/18/2019  Adriana Strong is a 74 y.o., female.    Pre-operative evaluation for Procedure(s) (LRB):  Replacement-valve-aortic (N/A)    Adriana Strong is a 74 y.o. female who presents initially for evaluation and treatment of severe symptomatic AS, here for TAVR.  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        Prev airway: Grade 1 on DL    Patient Active Problem List   Diagnosis    Enlarged ovary    Controlled type 2 diabetes mellitus with diabetic polyneuropathy, without long-term current use of insulin    Asthma    Chronic mesenteric ischemia    Mesenteric artery insufficiency    Gastroesophageal reflux disease    Essential hypertension    Hyperlipidemia    Warfarin anticoagulation    Old lacunar stroke without late effect    Constipation    Severe aortic stenosis    Umbilical hernia without obstruction and without gangrene    BMI 38.0-38.9,adult    Incisional hernia, without obstruction or gangrene    COPD (chronic obstructive pulmonary disease)    Atrial fibrillation, chronic    Costochondritis    Diabetic polyneuropathy associated with type 2 diabetes mellitus    DDD (degenerative disc disease), lumbar    Cataract    Low back pain    Difficulty walking    Muscle weakness    Balance problems    Decreased range of motion of trunk and back    Lumbar spondylosis    Neuroforaminal stenosis of lumbar spine    Spinal stenosis of lumbar region with neurogenic claudication    Lumbar radiculopathy       Review of patient's allergies indicates:   Allergen Reactions    Celebrex [celecoxib] Shortness Of Breath    Ciprofloxacin Swelling     lip    Dicyclomine Hives    Adhesive  Dermatitis    Avelox [moxifloxacin] Swelling    Cilostazol Other (See Comments)     Elevates blood pressure    Eryc [erythromycin] Other (See Comments)     unknown    Iodine and iodide containing products Hives    Keflex [cephalexin] Hives    Meclomen      rashes    Meloxicam      Ear ringing    Metoclopramide Other (See Comments)     High blood pressure    Sulfa (sulfonamide antibiotics) Itching        No current facility-administered medications on file prior to encounter.      Current Outpatient Medications on File Prior to Encounter   Medication Sig Dispense Refill    acetaminophen (TYLENOL ARTHRITIS) 650 MG TbSR Take 1,300 mg by mouth 2 (two) times daily.      albuterol (ACCUNEB) 1.25 mg/3 mL Nebu Take scheduled q4 for the next two days while at home then resume prn dosing 3 mL 1    albuterol (VENTOLIN HFA) 90 mcg/actuation inhaler Inhale 2 puffs into the lungs every 6 (six) hours as needed for Wheezing. Rescue      apixaban (ELIQUIS) 5 mg Tab Take 1 tablet (5 mg total) by mouth 2 (two) times daily. 180 tablet 3    ascorbic acid (VITAMIN C) 500 MG tablet Take 500 mg by mouth once daily.      azelastine (ASTELIN) 137 mcg nasal spray 1 spray by Nasal route 2 (two) times daily.      bisacodyl (DULCOLAX) 5 mg EC tablet Take 5 mg by mouth daily as needed for Constipation.      budesonide-formoterol 160-4.5 mcg (SYMBICORT) 160-4.5 mcg/actuation HFAA Inhale 2 puffs into the lungs every 12 (twelve) hours. Controller      calcium carbonate (OS-MICHAEL) 600 mg (1,500 mg) Tab Take 1,200 mg by mouth once daily.      cimetidine (TAGAMET) 200 MG tablet Take 300 mg by mouth.       cyanocobalamin (VITAMIN B-12) 1000 MCG tablet Take 100 mcg by mouth once daily.      digoxin (LANOXIN) 125 mcg tablet Take 1 tablet (125 mcg total) by mouth once daily. 90 tablet 3    diltiaZEM HCl (CARDIZEM LA) 240 mg 24 hr tablet Take 1 tablet (240 mg total) by mouth once daily. 90 tablet 3    diphenhydrAMINE (BENADRYL) 25 mg  capsule Take 50 mg by mouth.       DULoxetine (CYMBALTA) 20 MG capsule Take 1 capsule (20 mg total) by mouth 2 (two) times daily. 180 capsule 3    famotidine (PEPCID) 20 MG tablet Take 1 tablet (20 mg total) by mouth every 6 (six) hours. for 3 doses 3 tablet 0    fexofenadine (ALLEGRA) 60 MG tablet Take 60 mg by mouth every morning.      fish oil-omega-3 fatty acids 300-1,000 mg capsule Take 1 g by mouth once daily.       fluticasone (FLONASE) 50 mcg/actuation nasal spray 1 spray by Each Nare route every morning.       furosemide (LASIX) 40 MG tablet Take 1 tablet (40 mg total) by mouth once daily. 90 tablet 3    gabapentin (NEURONTIN) 300 MG capsule Take 2 capsules (600 mg total) by mouth 3 (three) times daily. 540 capsule 3    lisinopril (PRINIVIL,ZESTRIL) 20 MG tablet Take 1 tablet (20 mg total) by mouth 2 (two) times daily. 180 tablet 3    lorazepam (ATIVAN) 0.5 MG tablet Take 0.5 mg by mouth every morning.       magnesium 250 mg Tab Take 250 mg by mouth. 3 in am and 3 pm      metFORMIN (GLUCOPHAGE) 500 MG tablet Take 500 mg by mouth 2 (two) times daily.  1    nystatin (MYCOSTATIN) powder Apply topically 4 (four) times daily. 1 Bottle 0    ondansetron (ZOFRAN-ODT) 4 MG TbDL DISSOLVE 1 TABLET ON TONGUE EVERY 8 HOURS AS NEEDED FOR NAUSEA  1    oxybutynin (DITROPAN-XL) 5 MG TR24 Take 5 mg by mouth every other day.       pantoprazole (PROTONIX) 40 MG tablet Take 1 tablet (40 mg total) by mouth once daily. (Patient taking differently: Take 40 mg by mouth every morning. ) 90 tablet 3    POLYSORBATE 80/GLYCERIN (REFRESH DRY EYE THERAPY OPHT) Apply to eye 2 (two) times daily.      potassium gluconate 550 mg (90 mg) Tab Take by mouth. 2 in am and 2 pm      predniSONE (DELTASONE) 50 MG Tab Take by mouth.       traMADol (ULTRAM) 50 mg tablet Take 1 tablet (50 mg total) by mouth every 6 (six) hours as needed for Pain. 20 tablet 0    zafirlukast (ACCOLATE) 20 MG tablet Take 20 mg by mouth once daily.  3        Past Surgical History:   Procedure Laterality Date    ABDOMINAL SURGERY      angiocele      2007, 2014    BREAST SURGERY      left--- a lump--- no cancer    COLONOSCOPY  2014    COLONOSCOPY N/A 3/14/2018    Procedure: COLONOSCOPY;  Surgeon: Efren Kemp MD;  Location: [facility] ENDO (4TH FLR);  Service: Endoscopy;  Laterality: N/A;  ok to hold Plavix 5 days prior to procedure per Dr RESHMA Hernandez     ok per Dr Kemp to hold Eliquis 2 days prior to procedure (see telephone encounter dated-2/7/18)    HERNIA REPAIR      HYSTERECTOMY  partial    1982 partial hysterectomy    LEFT HEART CATHETERIZATION Right 11/5/2019    Procedure: Left heart cath;  Surgeon: Jack Capone MD;  Location: [facility] CATH LAB;  Service: Cardiology;  Laterality: Right;    left nasal polyp      left neck lymph node      nose polyp      right hip fatty mass tissue      stent to small intestine      SUPERIOR VENA CAVA ANGIOPLASTY / STENTING      TONSILLECTOMY      TOTAL ABDOMINAL HYSTERECTOMY      2014    TOTAL KNEE ARTHROPLASTY Bilateral     UPPER GASTROINTESTINAL ENDOSCOPY  2014       Social History     Socioeconomic History    Marital status:      Spouse name: Not on file    Number of children: Not on file    Years of education: Not on file    Highest education level: Not on file   Occupational History    Not on file   Social Needs    Financial resource strain: Not on file    Food insecurity:     Worry: Not on file     Inability: Not on file    Transportation needs:     Medical: Not on file     Non-medical: Not on file   Tobacco Use    Smoking status: Never Smoker    Smokeless tobacco: Never Used   Substance and Sexual Activity    Alcohol use: No    Drug use: No    Sexual activity: Not on file   Lifestyle    Physical activity:     Days per week: Not on file     Minutes per session: Not on file    Stress: Not on file   Relationships    Social connections:     Talks on phone: Not on file     Gets together: Not on  file     Attends Holiness service: Not on file     Active member of club or organization: Not on file     Attends meetings of clubs or organizations: Not on file     Relationship status: Not on file   Other Topics Concern    Not on file   Social History Narrative    Not on file         Vital Signs Range (Last 24H):  BP: ()/()   Arterial Line BP: ()/()       CBC: No results for input(s): WBC, RBC, HGB, HCT, PLT, MCV, MCH, MCHC in the last 72 hours.    CMP: No results for input(s): NA, K, CL, CO2, BUN, CREATININE, GLU, MG, PHOS, CALCIUM, ALBUMIN, PROT, ALKPHOS, ALT, AST, BILITOT in the last 72 hours.    INR  No results for input(s): PT, INR, PROTIME, APTT in the last 72 hours.        Diagnostic Studies:      EKG:  Vent. Rate : 098 BPM     Atrial Rate : 108 BPM     P-R Int : 000 ms          QRS Dur : 084 ms      QT Int : 330 ms       P-R-T Axes : 000 028 201 degrees     QTc Int : 421 ms    Atrial fibrillation  ST and T wave abnormality, consider inferior ischemia or digitalis effect  Abnormal ECG  When compared with ECG of 24-JAN-2019 14:13,  T wave inversion now evident in Inferior leads  T wave inversion now evident in Lateral leads  Confirmed by Kerwin CORLEY, [facility] ([id number]) on 2/4/2019 10:05:05 AM    2D Echo:  · Normal left ventricular systolic function. The estimated ejection fraction is 60% (QEF 62%)  · Concentric left ventricular remodeling.  · Biatrial enlargement.  · No wall motion abnormalities.  · Normal right ventricular systolic function.  · Aortic valve area is 0.80 cm2; peak velocity is 4.1 m/s; mean gradient is 42 mmHg.  · Severe aortic valve stenosis.  · Mild tricuspid regurgitation.  · The estimated PA systolic pressure is 39 mm Hg  · Normal central venous pressure (3 mm Hg).  · Atrial fibrillation observed.        Anesthesia Evaluation    I have reviewed the Patient Summary Reports.     I have reviewed the Medications.     Review of Systems  Anesthesia Hx:  No problems with previous Anesthesia   Denies  Personal Hx of Anesthesia complications.   Cardiovascular:   Exercise tolerance: poor Hypertension Valvular problems/Murmurs, AS CAD   Denies CABG/stent. Dysrhythmias atrial fibrillation PVD ECG has been reviewed.    Pulmonary:   COPD Asthma    Hepatic/GI:   GERD, well controlled    Neurological:   TIA, CVA, no residual symptoms Denies Seizures.    Endocrine:   Diabetes        Physical Exam  General:  Obesity    Airway/Jaw/Neck:  Airway Findings: Mouth Opening: Small, but > 3cm Tongue: Normal  General Airway Assessment: Adult  Mallampati: III  TM Distance: Normal, at least 6 cm  Jaw/Neck Findings:  Neck ROM: Extension Decreased, Mild      Dental:  Dental Findings: Periodontal disease, Mild        Mental Status:  Mental Status Findings:  Cooperative, Alert and Oriented         Anesthesia Plan  Type of Anesthesia, risks & benefits discussed:  Anesthesia Type:  general  Patient's Preference:   Intra-op Monitoring Plan: standard ASA monitors, central line and arterial line  Intra-op Monitoring Plan Comments:   Post Op Pain Control Plan: per primary service following discharge from PACU, IV/PO Opioids PRN and multimodal analgesia  Post Op Pain Control Plan Comments:   Induction:   IV  Beta Blocker:  Patient is not currently on a Beta-Blocker (No further documentation required).       Informed Consent: Patient understands risks and agrees with Anesthesia plan.  Questions answered. Anesthesia consent signed with patient.  ASA Score: 4     Day of Surgery Review of History & Physical:    H&P update referred to the surgeon.         Ready For Surgery From Anesthesia Perspective.

## 2019-11-19 NOTE — HPI
Adriana Strong is a pleasant 74. y.o. 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  She underwent a 26mm Phan S3 TAVR.     Pre TAVR EKG: AF HR 86 QRS 88

## 2019-11-20 VITALS
RESPIRATION RATE: 18 BRPM | HEART RATE: 87 BPM | TEMPERATURE: 98 F | OXYGEN SATURATION: 96 % | DIASTOLIC BLOOD PRESSURE: 72 MMHG | HEIGHT: 64 IN | BODY MASS INDEX: 38.99 KG/M2 | WEIGHT: 228.38 LBS | SYSTOLIC BLOOD PRESSURE: 160 MMHG

## 2019-11-20 PROBLEM — I50.33 ACUTE ON CHRONIC DIASTOLIC (CONGESTIVE) HEART FAILURE: Status: ACTIVE | Noted: 2019-11-20

## 2019-11-20 PROBLEM — E66.01 MORBID OBESITY: Status: ACTIVE | Noted: 2019-11-20

## 2019-11-20 LAB
ANION GAP SERPL CALC-SCNC: 10 MMOL/L (ref 8–16)
BASOPHILS # BLD AUTO: 0.06 K/UL (ref 0–0.2)
BASOPHILS NFR BLD: 0.5 % (ref 0–1.9)
BUN SERPL-MCNC: 19 MG/DL (ref 8–23)
CALCIUM SERPL-MCNC: 9.2 MG/DL (ref 8.7–10.5)
CHLORIDE SERPL-SCNC: 105 MMOL/L (ref 95–110)
CO2 SERPL-SCNC: 25 MMOL/L (ref 23–29)
CREAT SERPL-MCNC: 0.7 MG/DL (ref 0.5–1.4)
DIFFERENTIAL METHOD: ABNORMAL
EOSINOPHIL # BLD AUTO: 0 K/UL (ref 0–0.5)
EOSINOPHIL NFR BLD: 0.1 % (ref 0–8)
ERYTHROCYTE [DISTWIDTH] IN BLOOD BY AUTOMATED COUNT: 14.4 % (ref 11.5–14.5)
EST. GFR  (AFRICAN AMERICAN): >60 ML/MIN/1.73 M^2
EST. GFR  (NON AFRICAN AMERICAN): >60 ML/MIN/1.73 M^2
ESTIMATED AVG GLUCOSE: 140 MG/DL (ref 68–131)
GLUCOSE SERPL-MCNC: 139 MG/DL (ref 70–110)
HBA1C MFR BLD HPLC: 6.5 % (ref 4–5.6)
HCT VFR BLD AUTO: 36.8 % (ref 37–48.5)
HGB BLD-MCNC: 12 G/DL (ref 12–16)
IMM GRANULOCYTES # BLD AUTO: 0.04 K/UL (ref 0–0.04)
IMM GRANULOCYTES NFR BLD AUTO: 0.3 % (ref 0–0.5)
LYMPHOCYTES # BLD AUTO: 2.3 K/UL (ref 1–4.8)
LYMPHOCYTES NFR BLD: 19.1 % (ref 18–48)
MCH RBC QN AUTO: 31.9 PG (ref 27–31)
MCHC RBC AUTO-ENTMCNC: 32.6 G/DL (ref 32–36)
MCV RBC AUTO: 98 FL (ref 82–98)
MONOCYTES # BLD AUTO: 0.9 K/UL (ref 0.3–1)
MONOCYTES NFR BLD: 7.8 % (ref 4–15)
NEUTROPHILS # BLD AUTO: 8.5 K/UL (ref 1.8–7.7)
NEUTROPHILS NFR BLD: 72.2 % (ref 38–73)
NRBC BLD-RTO: 0 /100 WBC
PLATELET # BLD AUTO: 248 K/UL (ref 150–350)
PMV BLD AUTO: 10.3 FL (ref 9.2–12.9)
POC ACTIVATED CLOTTING TIME K: 142 SEC (ref 74–137)
POC ACTIVATED CLOTTING TIME K: 268 SEC (ref 74–137)
POCT GLUCOSE: 126 MG/DL (ref 70–110)
POCT GLUCOSE: 130 MG/DL (ref 70–110)
POCT GLUCOSE: 140 MG/DL (ref 70–110)
POCT GLUCOSE: 179 MG/DL (ref 70–110)
POTASSIUM SERPL-SCNC: 4 MMOL/L (ref 3.5–5.1)
RBC # BLD AUTO: 3.76 M/UL (ref 4–5.4)
SAMPLE: ABNORMAL
SAMPLE: ABNORMAL
SODIUM SERPL-SCNC: 140 MMOL/L (ref 136–145)
WBC # BLD AUTO: 11.77 K/UL (ref 3.9–12.7)

## 2019-11-20 PROCEDURE — 63600175 PHARM REV CODE 636 W HCPCS: Performed by: STUDENT IN AN ORGANIZED HEALTH CARE EDUCATION/TRAINING PROGRAM

## 2019-11-20 PROCEDURE — 80048 BASIC METABOLIC PNL TOTAL CA: CPT

## 2019-11-20 PROCEDURE — 93010 ELECTROCARDIOGRAM REPORT: CPT | Mod: ,,, | Performed by: INTERNAL MEDICINE

## 2019-11-20 PROCEDURE — 83036 HEMOGLOBIN GLYCOSYLATED A1C: CPT

## 2019-11-20 PROCEDURE — 99233 SBSQ HOSP IP/OBS HIGH 50: CPT | Mod: ,,, | Performed by: INTERNAL MEDICINE

## 2019-11-20 PROCEDURE — 93005 ELECTROCARDIOGRAM TRACING: CPT

## 2019-11-20 PROCEDURE — 93010 EKG 12-LEAD: ICD-10-PCS | Mod: ,,, | Performed by: INTERNAL MEDICINE

## 2019-11-20 PROCEDURE — 99233 PR SUBSEQUENT HOSPITAL CARE,LEVL III: ICD-10-PCS | Mod: ,,, | Performed by: INTERNAL MEDICINE

## 2019-11-20 PROCEDURE — 25000003 PHARM REV CODE 250: Performed by: STUDENT IN AN ORGANIZED HEALTH CARE EDUCATION/TRAINING PROGRAM

## 2019-11-20 PROCEDURE — 63600175 PHARM REV CODE 636 W HCPCS: Performed by: INTERNAL MEDICINE

## 2019-11-20 PROCEDURE — 94761 N-INVAS EAR/PLS OXIMETRY MLT: CPT

## 2019-11-20 PROCEDURE — 85025 COMPLETE CBC W/AUTO DIFF WBC: CPT

## 2019-11-20 RX ORDER — ISOSORBIDE MONONITRATE 30 MG/1
30 TABLET, EXTENDED RELEASE ORAL DAILY
Status: DISCONTINUED | OUTPATIENT
Start: 2019-11-20 | End: 2019-11-20 | Stop reason: HOSPADM

## 2019-11-20 RX ORDER — FUROSEMIDE 10 MG/ML
INJECTION INTRAMUSCULAR; INTRAVENOUS
Status: DISCONTINUED
Start: 2019-11-20 | End: 2019-11-20 | Stop reason: HOSPADM

## 2019-11-20 RX ORDER — ISOSORBIDE MONONITRATE 30 MG/1
30 TABLET, EXTENDED RELEASE ORAL DAILY
Qty: 30 TABLET | Refills: 11 | Status: SHIPPED | OUTPATIENT
Start: 2019-11-20 | End: 2020-10-01 | Stop reason: SDUPTHER

## 2019-11-20 RX ORDER — NITROGLYCERIN 0.4 MG/1
0.4 TABLET SUBLINGUAL EVERY 5 MIN PRN
Status: DISCONTINUED | OUTPATIENT
Start: 2019-11-20 | End: 2019-11-20 | Stop reason: HOSPADM

## 2019-11-20 RX ORDER — FUROSEMIDE 10 MG/ML
20 INJECTION INTRAMUSCULAR; INTRAVENOUS
Status: COMPLETED | OUTPATIENT
Start: 2019-11-20 | End: 2019-11-20

## 2019-11-20 RX ORDER — NITROGLYCERIN 0.4 MG/1
TABLET SUBLINGUAL
Status: DISCONTINUED
Start: 2019-11-20 | End: 2019-11-20 | Stop reason: HOSPADM

## 2019-11-20 RX ORDER — FUROSEMIDE 10 MG/ML
20 INJECTION INTRAMUSCULAR; INTRAVENOUS ONCE
Status: COMPLETED | OUTPATIENT
Start: 2019-11-20 | End: 2019-11-20

## 2019-11-20 RX ADMIN — ASPIRIN 81 MG: 81 TABLET, COATED ORAL at 08:11

## 2019-11-20 RX ADMIN — FUROSEMIDE 20 MG: 10 INJECTION, SOLUTION INTRAMUSCULAR; INTRAVENOUS at 06:11

## 2019-11-20 RX ADMIN — PANTOPRAZOLE SODIUM 40 MG: 40 TABLET, DELAYED RELEASE ORAL at 08:11

## 2019-11-20 RX ADMIN — FUROSEMIDE 20 MG: 10 INJECTION, SOLUTION INTRAMUSCULAR; INTRAVENOUS at 01:11

## 2019-11-20 RX ADMIN — NITROGLYCERIN 0.4 MG: 0.4 TABLET SUBLINGUAL at 06:11

## 2019-11-20 RX ADMIN — GABAPENTIN 300 MG: 300 CAPSULE ORAL at 08:11

## 2019-11-20 RX ADMIN — DIGOXIN 0.12 MG: 125 TABLET ORAL at 08:11

## 2019-11-20 RX ADMIN — ISOSORBIDE MONONITRATE 30 MG: 30 TABLET, EXTENDED RELEASE ORAL at 02:11

## 2019-11-20 NOTE — NURSING
Pt reports sharp intermittent CP of 6/10, radiating to left arm and worse on inspiration. SBP 180s. 12 Lead EKG completed. Dr. Capone notified and asked that pt be moved into chair. SBP down to 160s once pt resting in chair. WCTM.

## 2019-11-20 NOTE — NURSING
Notified Dr. Capone's team that Mrs. Strong experienced severe chest pain 7/10 when attempting to walk around the unit. Patient walked 15-20 steps; then started to experience chest pain. Patient describes the chest pain as a constant pressure in her chest. Team stated someone will be to the bedside shortly. Will continue to monitor patient.

## 2019-11-20 NOTE — PROGRESS NOTES
[facility]  Cardiac Electrophysiology  Progress Note    Admission Date: 11/19/2019  Code Status: Prior   Attending Physician: Jack Capone MD   Expected Discharge Date:   Principal Problem:Severe aortic stenosis    Subjective:     Interval History: telemetry with atrial fibrillation with occasional pacing while sleeping.    Review of Systems   Constitution: Negative for chills and fever.   Cardiovascular: Positive for chest pain. Negative for claudication, dyspnea on exertion, irregular heartbeat, leg swelling, near-syncope, orthopnea, palpitations, paroxysmal nocturnal dyspnea and syncope.   Respiratory: Negative for cough and shortness of breath.    Musculoskeletal: Negative for joint pain and joint swelling.   Gastrointestinal: Negative for abdominal pain, nausea and vomiting.   Neurological: Negative for focal weakness and headaches.   All other systems reviewed and are negative.    Objective:     Vital Signs (Most Recent):  Temp: 97.8 °F (36.6 °C) (11/20/19 0705)  Pulse: 85 (11/20/19 0900)  Resp: (!) 25 (11/20/19 0900)  BP: (!) 175/74 (11/20/19 0705)  SpO2: 98 % (11/20/19 0900) Vital Signs (24h Range):  Temp:  [96.5 °F (35.8 °C)-97.9 °F (36.6 °C)] 97.8 °F (36.6 °C)  Pulse:  [67-87] 85  Resp:  [13-31] 25  SpO2:  [93 %-99 %] 98 %  BP: (124-184)/(50-80) 175/74  Arterial Line BP: (124-165)/(48-66) 160/63     Weight: 103.6 kg (228 lb 6.3 oz)  Body mass index is 39.2 kg/m².     SpO2: 98 %  O2 Device (Oxygen Therapy): room air    Physical Exam   Constitutional: She is oriented to person, place, and time. She appears well-developed and well-nourished. No distress.   Eyes: Pupils are equal, round, and reactive to light. Conjunctivae are normal.   Neck: No tracheal deviation present. No thyromegaly present.   Cardiovascular: Normal rate, normal heart sounds and intact distal pulses. An irregularly irregular rhythm present. Exam reveals no gallop and no friction rub.   No murmur heard.  Pulses:        Radial pulses are 2+ on the right side, and 2+ on the left side.        Femoral pulses are 2+ on the right side, and 2+ on the left side.  Pulmonary/Chest: Effort normal and breath sounds normal. No respiratory distress. She has no wheezes. She has no rales.   Abdominal: Soft. Bowel sounds are normal. She exhibits no distension. There is no tenderness.   Musculoskeletal: She exhibits no edema or deformity.   Neurological: She is alert and oriented to person, place, and time. No cranial nerve deficit. Coordination normal.   Skin: Skin is warm and dry. She is not diaphoretic.   Psychiatric: She has a normal mood and affect. Her behavior is normal.       Significant Labs: All pertinent lab results from the last 24 hours have been reviewed.    Significant Imaging: Reviewed    Assessment and Plan:     S/P TAVR (transcatheter aortic valve replacement)  In summary, patient with severe AS s/p TF TAVR. EP consulted for further management of TVP and if PPM is indicated.     Pre-TAVR EKG: AF HR 86 QRS 88  Post-TAVR EKG:  Post-day EKG: QRS 82 ms      Plan:   - EP team with sign off. Please call if we can be of any assistance.        Lane Dodson MD  Cardiac Electrophysiology  [facility]

## 2019-11-20 NOTE — SUBJECTIVE & OBJECTIVE
Interval History: telemetry with atrial fibrillation with occasional pacing while sleeping.    Review of Systems   Constitution: Negative for chills and fever.   Cardiovascular: Positive for chest pain. Negative for claudication, dyspnea on exertion, irregular heartbeat, leg swelling, near-syncope, orthopnea, palpitations, paroxysmal nocturnal dyspnea and syncope.   Respiratory: Negative for cough and shortness of breath.    Musculoskeletal: Negative for joint pain and joint swelling.   Gastrointestinal: Negative for abdominal pain, nausea and vomiting.   Neurological: Negative for focal weakness and headaches.   All other systems reviewed and are negative.    Objective:     Vital Signs (Most Recent):  Temp: 97.8 °F (36.6 °C) (11/20/19 0705)  Pulse: 85 (11/20/19 0900)  Resp: (!) 25 (11/20/19 0900)  BP: (!) 175/74 (11/20/19 0705)  SpO2: 98 % (11/20/19 0900) Vital Signs (24h Range):  Temp:  [96.5 °F (35.8 °C)-97.9 °F (36.6 °C)] 97.8 °F (36.6 °C)  Pulse:  [67-87] 85  Resp:  [13-31] 25  SpO2:  [93 %-99 %] 98 %  BP: (124-184)/(50-80) 175/74  Arterial Line BP: (124-165)/(48-66) 160/63     Weight: 103.6 kg (228 lb 6.3 oz)  Body mass index is 39.2 kg/m².     SpO2: 98 %  O2 Device (Oxygen Therapy): room air    Physical Exam   Constitutional: She is oriented to person, place, and time. She appears well-developed and well-nourished. No distress.   Eyes: Pupils are equal, round, and reactive to light. Conjunctivae are normal.   Neck: No tracheal deviation present. No thyromegaly present.   Cardiovascular: Normal rate, normal heart sounds and intact distal pulses. An irregularly irregular rhythm present. Exam reveals no gallop and no friction rub.   No murmur heard.  Pulses:       Radial pulses are 2+ on the right side, and 2+ on the left side.        Femoral pulses are 2+ on the right side, and 2+ on the left side.  Pulmonary/Chest: Effort normal and breath sounds normal. No respiratory distress. She has no wheezes. She has no  rales.   Abdominal: Soft. Bowel sounds are normal. She exhibits no distension. There is no tenderness.   Musculoskeletal: She exhibits no edema or deformity.   Neurological: She is alert and oriented to person, place, and time. No cranial nerve deficit. Coordination normal.   Skin: Skin is warm and dry. She is not diaphoretic.   Psychiatric: She has a normal mood and affect. Her behavior is normal.       Significant Labs: All pertinent lab results from the last 24 hours have been reviewed.    Significant Imaging: Reviewed

## 2019-11-20 NOTE — DISCHARGE SUMMARY
[facility]  Interventional Cardiology  Discharge Summary      Patient Name: Adriana Strong  MRN: [id number]  Admission Date: 11/19/2019  Hospital Length of Stay: 1 days  Discharge Date and Time:  11/20/2019 12:11 PM  Attending Physician: aJck Capone MD  Discharging Provider: Hany Matthew MD  Primary Care Physician: Krzysztof Mcgraw MD    HPI:     Severe symptomatic AS (NYHA class III, CCS class III):     The patient has undergone the following TAVR work-up:               ECHO (Date 10/22/19): MISTY 0.80 cm2, peak aortic jet velocity 4.1m/s, MG 42 mmHg, LVEF: 60 %              LHC (11/5/2019 by Dr Capone)- 70% tubular long mid LAD lesion with small distal LAD vessel, plan to treat medically giving that PCI may risk jailing a good size Diagonal branch.              STS: 3.9%               Frailty: 2/4 (Walk / handgrip)               Iliacs are >6.92 on R and >7.99 on L              LVOT area: LVOT 20.8mm, Distance 26.7mm x 21.8mm, Area 4.47cm. Avg diameter 23.9mm              Incidental findings on CT: R pulmonary nodule, hepatomegaly               CT Surgery risk assessment: inoperable per Dr. Vargas (porcelin aorta and functional status)               Rhythm issues: Afib, no block              PFTs: FEV1 62.3 predicted, DLCO 81%  predicted.              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, cane for gait imbalance       Procedure(s) (LRB):  Replacement-valve-aortic (N/A)     Indwelling Lines/Drains at time of discharge:  Lines/Drains/Airways     Central Venous Catheter Line                 Introducer 11/19/19 1300 right internal jugular less than 1 day          Arterial Line                 Arterial Line 11/19/19 1259 Right Radial less than 1 day          Line                 Pacer Wires 11/19/19 1305 less than 1 day                Hospital Course (synopsis of major diagnoses, care, treatment, and services provided during the course of the hospital stay):     Subjective:    Patient remained clinically stable and no acute events overnight. She was able to walk in the unit without difficulty. She will follow up in the clinic in one month and have Watchman evaluation.         Physical Exam:  General appearance: well developed, well nourished  Head: normocephalic, atraumatic  Eyes:  conjunctivae/corneas clear. PERRL.  Nose: Nares normal. Septum midline.  Throat: lips, mucosa, and tongue normal; teeth and gums normal and no throat erythema  Neck: supple, symmetrical, trachea midline,+ JVD   Lungs:  clear to auscultation bilaterally and normal respiratory effort  Heart: regular rate and rhythm, S1, S2 normal, no murmur, click, rub or gallop  Abdomen: soft, non-tender non-distented;BS posl; no masses,  no organomegaly  Extremities: mild BL LE edema, or clubbing  Pulses: 2+ DP/PT, BL groins soft w/o hematoma or thrill  Skin: Skin color, texture, turgor normal. No rashes or lesions  Neurologic: Normal strength and tone. No focal numbness or weakness     A/P:  Severe Aortic Stenosis  - Successful L  transfemoral 26 S3 mm TAVR   - Post valve transthoracic echo showed no paravalvular leak  - Cont ASA and eliquis     Acute On Chronic Diastolic Heart Failure Exacerbation  - PT with volume overload requiring IV diuretics post procedure  - Improved; continue lasix 40 mg po at home  - Continue with optimal blood pressure control     Morbid Obesity  -Discussed lifestyle modification and improve exercise     Hypertension  --Controlled in the hospital  -Should continue current medical therapy  -Instructed to keep BP log at home and bring it for the next appointment  -Discussed about healthy life style modification including daily exercise 30 min/5 x week, diet (MESH and Mediterranean)improvement according to AHA guidelines. Questions and concerns were properly answered.        Hospital Course:   Pt admitted and underwent successful placement of a 26 S3 TAVR via TF access under MAC sedation. TVP was left  in place and EP was consulted. Pt was taken to the CCU in stable condition. Pt remained stable overnight. QRS remained stable and TVP removed. Pt  required IV diuresis for acute on chronic diastolic heart failure with IV Lasix. Diuresed well. That afternoon, pt ambulated with PT without difficulty. Pt was eager to go home. It was felt pt was stable for discharge. Continue ASA and Eliquis and f/u in one month in valve clinic. She has history of chronic afib. CHADS VASC 4 and HAS BLED score of 3. She verbalizes interest on the Watchman device. She will be evaluated for the device on follow up. Video was shown to her during this hospitalization.       Consults (From admission, onward)        Status Ordering Provider     Inpatient consult to Electrophysiology  Once     Provider:  (Not yet assigned)    Completed CASE [place]          Significant Diagnostic Studies: Labs:   BMP:   Recent Labs   Lab 11/19/19  1139 11/20/19  0451   * 139*    140   K 4.0 4.0   CL 99 105   CO2 28 25   BUN 14 19   CREATININE 0.8 0.7   CALCIUM 9.4 9.2    and CBC   Recent Labs   Lab 11/19/19  1033 11/20/19  0451   WBC 8.17 11.77   HGB 12.7 12.0   HCT 39.3 36.8*    248       Pending Diagnostic Studies:     Procedure Component Value Units Date/Time    EKG 12-LEAD starting tomorrow [[id number]]     Order Status:  Sent Lab Status:  No result         Final Active Diagnoses:    Diagnosis Date Noted POA    PRINCIPAL PROBLEM:  Severe aortic stenosis [I35.0] 02/03/2017 Yes    Morbid obesity [E66.01] 11/20/2019 Unknown    Acute on chronic diastolic (congestive) heart failure [I50.33] 11/20/2019 Unknown    S/P TAVR (transcatheter aortic valve replacement) [Z95.2] 11/19/2019 Not Applicable    Atrial fibrillation, chronic [I48.20] 06/23/2017 Yes    COPD (chronic obstructive pulmonary disease) [J44.9] 06/13/2017 Yes    Hyperlipidemia [E78.5] 06/17/2016 Yes    Essential hypertension [I10] 04/08/2016 Yes    Mesenteric  artery insufficiency [K55.1] 05/05/2015 Yes    Controlled type 2 diabetes mellitus with diabetic polyneuropathy, without long-term current use of insulin [E11.42] 11/24/2014 Yes      Problems Resolved During this Admission:       Discharged Condition: good    Follow Up:    Patient Instructions:   No discharge procedures on file.  Medications:  Reconciled Home Medications:      Medication List      ASK your doctor about these medications    * Ventolin HFA 90 mcg/actuation inhaler  Generic drug:  albuterol  Inhale 2 puffs into the lungs every 6 (six) hours as needed for Wheezing. Rescue     * albuterol 1.25 mg/3 mL Nebu  Commonly known as:  ACCUNEB  Take scheduled q4 for the next two days while at home then resume prn dosing     apixaban 5 mg Tab  Commonly known as:  ELIQUIS  Take 1 tablet (5 mg total) by mouth 2 (two) times daily.     aspirin 81 MG EC tablet  Commonly known as:  ECOTRIN  Take 81 mg by mouth once daily.     azelastine 137 mcg (0.1 %) nasal spray  Commonly known as:  ASTELIN  1 spray by Nasal route 2 (two) times daily.     bisacodyl 5 mg EC tablet  Commonly known as:  DULCOLAX  Take 5 mg by mouth daily as needed for Constipation.     calcium carbonate 600 mg calcium (1,500 mg) Tab  Commonly known as:  OS-MICHAEL  Take 1,200 mg by mouth once daily.     cimetidine 200 MG tablet  Commonly known as:  TAGAMET  Take 300 mg by mouth.     clopidogrel 75 mg tablet  Commonly known as:  PLAVIX  Take 75 mg by mouth once daily.     digoxin 125 mcg tablet  Commonly known as:  LANOXIN  Take 1 tablet (125 mcg total) by mouth once daily.     diltiaZEM HCl 240 mg 24 hr tablet  Commonly known as:  CARDIZEM LA  Take 1 tablet (240 mg total) by mouth once daily.     diphenhydrAMINE 25 mg capsule  Commonly known as:  BENADRYL  Take 50 mg by mouth.     DULoxetine 20 MG capsule  Commonly known as:  CYMBALTA  Take 1 capsule (20 mg total) by mouth 2 (two) times daily.     famotidine 20 MG tablet  Commonly known as:  Pepcid  Take 1  tablet (20 mg total) by mouth every 6 (six) hours. for 3 doses     fexofenadine 60 MG tablet  Commonly known as:  ALLEGRA  Take 60 mg by mouth every morning.     fish oil-omega-3 fatty acids 300-1,000 mg capsule  Take 1 g by mouth once daily.     fluticasone propionate 50 mcg/actuation nasal spray  Commonly known as:  FLONASE  1 spray by Each Nare route every morning.     furosemide 40 MG tablet  Commonly known as:  LASIX  Take 1 tablet (40 mg total) by mouth once daily.     gabapentin 300 MG capsule  Commonly known as:  NEURONTIN  Take 2 capsules (600 mg total) by mouth 3 (three) times daily.     lisinopril 20 MG tablet  Commonly known as:  PRINIVIL,ZESTRIL  Take 1 tablet (20 mg total) by mouth 2 (two) times daily.     LORazepam 0.5 MG tablet  Commonly known as:  ATIVAN  Take 0.5 mg by mouth every morning.     magnesium 250 mg Tab  Take 250 mg by mouth. 3 in am and 3 pm     metFORMIN 500 MG tablet  Commonly known as:  GLUCOPHAGE  Take 500 mg by mouth 2 (two) times daily.     nystatin powder  Commonly known as:  MYCOSTATIN  Apply topically 4 (four) times daily.     ondansetron 4 MG Tbdl  Commonly known as:  ZOFRAN-ODT  DISSOLVE 1 TABLET ON TONGUE EVERY 8 HOURS AS NEEDED FOR NAUSEA     oxybutynin 5 MG Tr24  Commonly known as:  DITROPAN-XL  Take 5 mg by mouth every other day.     pantoprazole 40 MG tablet  Commonly known as:  Protonix  Take 1 tablet (40 mg total) by mouth once daily.     potassium gluconate 550 mg (90 mg) Tab  Take by mouth. 2 in am and 2 pm     predniSONE 50 MG Tab  Commonly known as:  DELTASONE  Take by mouth.     REFRESH DRY EYE THERAPY OPHT  Apply to eye 2 (two) times daily.     Symbicort 160-4.5 mcg/actuation Hfaa  Generic drug:  budesonide-formoterol 160-4.5 mcg  Inhale 2 puffs into the lungs every 12 (twelve) hours. Controller     traMADol 50 mg tablet  Commonly known as:  ULTRAM  Take 1 tablet (50 mg total) by mouth every 6 (six) hours as needed for Pain.     Tylenol Arthritis 650 MG  Tbsr  Generic drug:  acetaminophen  Take 1,300 mg by mouth 2 (two) times daily.     Vitamin B-12 1000 MCG tablet  Generic drug:  cyanocobalamin  Take 100 mcg by mouth once daily.     Vitamin C 500 MG tablet  Generic drug:  ascorbic acid (vitamin C)  Take 500 mg by mouth once daily.     zafirlukast 20 MG tablet  Commonly known as:  ACCOLATE  Take 20 mg by mouth once daily.         * This list has 2 medication(s) that are the same as other medications prescribed for you. Read the directions carefully, and ask your doctor or other care provider to review them with you.                Time spent on the discharge of patient: 30 minutes    Hany Matthew MD  Interventional Cardiology  [facility]

## 2019-11-20 NOTE — ASSESSMENT & PLAN NOTE
In summary, patient with severe AS s/p TF TAVR. EP consulted for further management of TVP and if PPM is indicated.     Pre-TAVR EKG: AF HR 86 QRS 88  Post-TAVR EKG:  Post-day EKG: QRS 82 ms      Plan:   - EP team with sign off. Please call if we can be of any assistance.

## 2019-11-20 NOTE — NURSING
Pt CP now constant 7/10 and described as heavy pressure in [facility] area. Dr. Capone notified of CP and order 20 mg of lasix and 1 SL tab of nitroglycerin. CP improving after SL nitro. WCTM.

## 2019-11-20 NOTE — PLAN OF CARE
Extended Emergency Contact Information  Primary Emergency Contact: Anthony Strong  Address: [address] [place] [address] [facility] [place]  Home Phone: [phone]  Relation: Spouse  Secondary Emergency Contact: Tammy Kemp  Address: unknown   [place]  Mobile Phone: [phone]  Relation: Daughter    Krzysztof Mcgraw MD  [address] / [place] [address]    Future Appointments   Date Time Provider Department Center   12/18/2019 11:00 AM ECHO, [facility] ECHOL[facility] [address]   12/18/2019  1:40 PM LAB, APPOINTMENT [place] [facility]   12/18/2019  3:00 PM INTERVENTIONAL, VALVE CLINIC [facility] CARDVAL [facility]   1/15/2020  9:15 AM Conner Eugene MD [facility] VASCSUR [facility]   2/11/2020  2:30 PM Jr Carrington MD [facility] CARDIO [facility]     Payor: [employer] MEDICARE / Plan: [employer] CHOICES 65 / Product Type: Medicare Advantage /       [id number] [place], [place] - [address] [facility] OF [address] [address]  Phone: [phone] Fax: [phone]       11/20/19 1611   Discharge Assessment   Assessment Type Discharge Planning Assessment   Confirmed/corrected address and phone number on facesheet? No   Assessment information obtained from? Medical Record   Expected Length of Stay (days) 1   Communicated expected length of stay with patient/caregiver no   Prior to hospitilization cognitive status: Unable to Assess   Prior to hospitalization functional status: Independent;Assistive Equipment   Current cognitive status: Alert/Oriented   Current Functional Status: Assistive Equipment;Independent   Lives With spouse   Able to Return to Prior Arrangements yes   Is patient able to care for self after discharge? Yes   Readmission Within the Last 30 Days no previous admission in last 30 days   Patient currently being followed by outpatient case management? No   Patient currently  receives any other outside agency services? No   Does the patient have transportation home? Yes   Transportation Anticipated family or friend will provide   Does the patient receive services at the Coumadin Clinic? No   Discharge Plan A Home   Discharge Plan B Home   DME Needed Upon Discharge  none   Patient/Family in Agreement with Plan unable to assess

## 2019-11-21 RX ORDER — NITROGLYCERIN 0.4 MG/1
0.4 TABLET SUBLINGUAL EVERY 5 MIN PRN
Qty: 30 TABLET | Refills: 11 | Status: SHIPPED | OUTPATIENT
Start: 2019-11-21 | End: 2022-08-11 | Stop reason: SDUPTHER

## 2019-11-22 ENCOUNTER — PATIENT OUTREACH (OUTPATIENT)
Dept: ADMINISTRATIVE | Facility: CLINIC | Age: 74
End: 2019-11-22

## 2019-11-22 NOTE — PATIENT INSTRUCTIONS
After Your Transcatheter Aortic Valve Replacement (TAVR)  You have just had surgery to replace your aortic valve with a new biological tissue valve. When you go home, follow all instructions for medicines, pain control, diet, activity, and wound care. Make sure to keep all your follow-up appointments. You should feel better after your surgery. Complete recovery may take several weeks. How long depends on whether the surgery was done through your groin, underneath the collarbone, or between your ribs. All of these methods are considered less invasive than open heart surgery. This means fewer complications and a shorter recovery time. Below are some general guidelines to follow as you heal.     A pill organizer can help you keep track of the medicines you take each day.   Recovering at home  Follow your healthcare providers directions for recovery at home. It may take several weeks to get back to your normal routine. Using the groin artery is the least invasive method and heals the fastest. TAVR done between your ribs requires a larger incision takes longer to recover from.  During your recovery:  · Take medicine as directed. Take pain medicine, blood thinners, and any other medicine exactly as your healthcare provider advises. You will likely need to take aspirin and another blood thinner (antiplatelets) for a period. Youll need to take the aspirin for the rest of your life. Be sure your doctor knows about all other medicines you take, including over-the-counter medicines and dietary supplements.  · Care for your incision. Its normal for your incision to be bruised, itchy, or sore while its healing. Your incision may take a week or more to heal. A surgery site between your ribs will take longer to heal than one in your groin. Care for the bandage and incision as advised. Wash it every day with warm water and soap. Gently pat it dry. Dont put powder, lotion, or ointment on the incision until its  healed.  · Shower carefully. Unless youre told otherwise, you can shower once you get home. Use warm water and a mild soap. Avoid hot water because it can make you feel lightheaded. Dont take a bath until your healthcare provider says its OK. Also dont sit in a swimming pool or hot tub until your doctor says its OK. If your surgery was between your ribs, you may need to keep the incision site dry for a certain period.  · Avoid activities as directed. Your doctor may tell you to avoid strenuous activities for a week or more. This includes no heavy lifting. Instructions on what to do are different depending on how your surgery was done. Your doctor will give you specific instructions that are appropriate for you.  · Dont drive if you are taking opioid pain medicine. These medicines can make you feel sleepy and it is unsafe to drive or operate heavy machinery while taking them.  · Walk regularly. One of the best ways to get stronger is to walk. If your doctor agrees, start with short walks at home. Walk a little more each day. Take someone with you until you feel OK to walk alone. Your healthcare provider may recommend a cardiac rehab program. This will allow you to be closely monitored  by trained personnel during exercise. Most insurance companies will cover these programs.  Call 911  Call 911 for immediate care if you have:  · Chest pain  · Severe difficulty breathing  · Signs of stroke such as sudden numbness or weakness in the face, arms, or legs  When should I call my healthcare provider?  Seek immediate medical help if you have any of the following symptoms:  · Bowel movement that is bright red  · Bleeding that is frequent or persistent, such as nosebleeds  · Chills or fever of 100.4°F (38°C) or higher  · Dizziness, lightheadedness, or fainting  · Redness, swelling, bleeding, warmth, or fluid draining at the incision site  · Weight gain of more than 2 to 3 pounds in 24 hours or more than 5 pounds in 1  week  · Swelling in your hands, feet, or ankles  · Shortness of breath that doesnt get better when you rest  · Pain that gets worse or doesnt go away  · Fast, slow, or irregular pulse  · Worsening or severe fatigue  · Other signs or symptoms as indicated by your healthcare provider   Follow-up care  Follow-up visits with your healthcare provider help make sure youre recovering well. In fact, to keep feeling your best, youll need regular checkups for the rest of your life. During these visits, you may have:  · Blood tests to monitor the function of your heart and kidneys, and check for anemia  · Echocardiograms to check how well your heart and new heart valve are working  · Electrocardiograms (ECGs) to show if your heart rhythm has changed after your valve replacement  Staying healthy after a heart valve replacement  · Learn to take your own blood pressure and pulse. Keep a record of your results. Ask your healthcare provider which readings mean that you need medical care.  · Weigh yourself everyday and keep a record of this. It is normal for your weight to fluctuate 2 to 3 pounds in a day, anything more than this could mean you are retaining fluid and developing heart failure  · Tell all your healthcare providers and dentists youve had a valve replacement. Before any dental procedure, you will need to take an antibiotic to protect your new heart valve.  · Take any prescribed blood thinners exactly as directed.  · Make lifestyle changes as advised by your healthcare provider. Keep in mind that healthy habits such as exercise, and a healthy diet can strengthen your heart.  · Alert your healthcare provider to any new symptoms.  Date Last Reviewed: 5/1/2016  © 3402-9444 [employer]. [address] [place] [address], [place] [address]. All rights reserved. This information is not intended as a substitute for professional medical care. Always follow your healthcare professional's instructions.

## 2019-11-26 ENCOUNTER — HOSPITAL ENCOUNTER (OUTPATIENT)
Facility: HOSPITAL | Age: 74
Discharge: HOME | End: 2019-11-27
Attending: EMERGENCY MEDICINE | Admitting: EMERGENCY MEDICINE
Payer: MEDICARE

## 2019-11-26 DIAGNOSIS — R07.9 CHEST PAIN: ICD-10-CM

## 2019-11-26 DIAGNOSIS — R00.1 BRADYCARDIA: Primary | ICD-10-CM

## 2019-11-26 DIAGNOSIS — R55 SYNCOPE: ICD-10-CM

## 2019-11-26 PROBLEM — N17.9 AKI (ACUTE KIDNEY INJURY): Status: ACTIVE | Noted: 2019-11-26

## 2019-11-26 PROBLEM — R51.9 HEADACHE: Status: ACTIVE | Noted: 2019-11-26

## 2019-11-26 LAB
ALBUMIN SERPL BCP-MCNC: 3.7 G/DL (ref 3.5–5.2)
ALP SERPL-CCNC: 47 U/L (ref 55–135)
ALT SERPL W/O P-5'-P-CCNC: 32 U/L (ref 10–44)
ANION GAP SERPL CALC-SCNC: 12 MMOL/L (ref 8–16)
ANION GAP SERPL CALC-SCNC: 9 MMOL/L (ref 8–16)
APTT BLDCRRT: 32.5 SEC (ref 21–32)
AST SERPL-CCNC: 51 U/L (ref 10–40)
BASOPHILS # BLD AUTO: 0.11 K/UL (ref 0–0.2)
BASOPHILS NFR BLD: 0.8 % (ref 0–1.9)
BILIRUB SERPL-MCNC: 0.7 MG/DL (ref 0.1–1)
BNP SERPL-MCNC: 111 PG/ML (ref 0–99)
BUN SERPL-MCNC: 22 MG/DL (ref 8–23)
BUN SERPL-MCNC: 23 MG/DL (ref 8–23)
BUN SERPL-MCNC: 31 MG/DL (ref 6–30)
CALCIUM SERPL-MCNC: 9.3 MG/DL (ref 8.7–10.5)
CALCIUM SERPL-MCNC: 9.7 MG/DL (ref 8.7–10.5)
CHLORIDE SERPL-SCNC: 101 MMOL/L (ref 95–110)
CHLORIDE SERPL-SCNC: 101 MMOL/L (ref 95–110)
CHLORIDE SERPL-SCNC: 99 MMOL/L (ref 95–110)
CO2 SERPL-SCNC: 25 MMOL/L (ref 23–29)
CO2 SERPL-SCNC: 28 MMOL/L (ref 23–29)
CREAT SERPL-MCNC: 0.9 MG/DL (ref 0.5–1.4)
CREAT SERPL-MCNC: 1.1 MG/DL (ref 0.5–1.4)
CREAT SERPL-MCNC: 1.2 MG/DL (ref 0.5–1.4)
DIFFERENTIAL METHOD: ABNORMAL
DIGOXIN SERPL-MCNC: 0.8 NG/ML (ref 0.8–2)
EOSINOPHIL # BLD AUTO: 0 K/UL (ref 0–0.5)
EOSINOPHIL NFR BLD: 0.3 % (ref 0–8)
ERYTHROCYTE [DISTWIDTH] IN BLOOD BY AUTOMATED COUNT: 14.8 % (ref 11.5–14.5)
EST. GFR  (AFRICAN AMERICAN): 50.7 ML/MIN/1.73 M^2
EST. GFR  (AFRICAN AMERICAN): >60 ML/MIN/1.73 M^2
EST. GFR  (NON AFRICAN AMERICAN): 44 ML/MIN/1.73 M^2
EST. GFR  (NON AFRICAN AMERICAN): >60 ML/MIN/1.73 M^2
GLUCOSE SERPL-MCNC: 105 MG/DL (ref 70–110)
GLUCOSE SERPL-MCNC: 166 MG/DL (ref 70–110)
GLUCOSE SERPL-MCNC: 179 MG/DL (ref 70–110)
HCT VFR BLD AUTO: 41.9 % (ref 37–48.5)
HCT VFR BLD CALC: 42 %PCV (ref 36–54)
HGB BLD-MCNC: 13.5 G/DL (ref 12–16)
IMM GRANULOCYTES # BLD AUTO: 0.14 K/UL (ref 0–0.04)
IMM GRANULOCYTES NFR BLD AUTO: 1.1 % (ref 0–0.5)
INR PPP: 1.1 (ref 0.8–1.2)
LACTATE SERPL-SCNC: 2.3 MMOL/L (ref 0.5–2.2)
LYMPHOCYTES # BLD AUTO: 3.3 K/UL (ref 1–4.8)
LYMPHOCYTES NFR BLD: 25.6 % (ref 18–48)
MAGNESIUM SERPL-MCNC: 1.8 MG/DL (ref 1.6–2.6)
MCH RBC QN AUTO: 31.6 PG (ref 27–31)
MCHC RBC AUTO-ENTMCNC: 32.2 G/DL (ref 32–36)
MCV RBC AUTO: 98 FL (ref 82–98)
MONOCYTES # BLD AUTO: 1.1 K/UL (ref 0.3–1)
MONOCYTES NFR BLD: 8.8 % (ref 4–15)
NEUTROPHILS # BLD AUTO: 8.2 K/UL (ref 1.8–7.7)
NEUTROPHILS NFR BLD: 63.4 % (ref 38–73)
NRBC BLD-RTO: 0 /100 WBC
PLATELET # BLD AUTO: 262 K/UL (ref 150–350)
PMV BLD AUTO: 10.6 FL (ref 9.2–12.9)
POC IONIZED CALCIUM: 0.89 MMOL/L (ref 1.06–1.42)
POC TCO2 (MEASURED): 24 MMOL/L (ref 23–29)
POCT GLUCOSE: 177 MG/DL (ref 70–110)
POCT GLUCOSE: 194 MG/DL (ref 70–110)
POCT GLUCOSE: 96 MG/DL (ref 70–110)
POTASSIUM BLD-SCNC: 6.5 MMOL/L (ref 3.5–5.1)
POTASSIUM SERPL-SCNC: 4.5 MMOL/L (ref 3.5–5.1)
POTASSIUM SERPL-SCNC: 5.6 MMOL/L (ref 3.5–5.1)
PROT SERPL-MCNC: 7.5 G/DL (ref 6–8.4)
PROTHROMBIN TIME: 11.2 SEC (ref 9–12.5)
RBC # BLD AUTO: 4.27 M/UL (ref 4–5.4)
SAMPLE: ABNORMAL
SODIUM BLD-SCNC: 132 MMOL/L (ref 136–145)
SODIUM SERPL-SCNC: 136 MMOL/L (ref 136–145)
SODIUM SERPL-SCNC: 138 MMOL/L (ref 136–145)
TROPONIN I SERPL DL<=0.01 NG/ML-MCNC: 0.02 NG/ML (ref 0–0.03)
TROPONIN I SERPL DL<=0.01 NG/ML-MCNC: 0.03 NG/ML (ref 0–0.03)
TSH SERPL DL<=0.005 MIU/L-ACNC: 3.38 UIU/ML (ref 0.4–4)
WBC # BLD AUTO: 12.99 K/UL (ref 3.9–12.7)

## 2019-11-26 PROCEDURE — 25000003 PHARM REV CODE 250: Performed by: INTERNAL MEDICINE

## 2019-11-26 PROCEDURE — 99291 CRITICAL CARE FIRST HOUR: CPT | Mod: ,,, | Performed by: EMERGENCY MEDICINE

## 2019-11-26 PROCEDURE — 36415 COLL VENOUS BLD VENIPUNCTURE: CPT

## 2019-11-26 PROCEDURE — 83735 ASSAY OF MAGNESIUM: CPT

## 2019-11-26 PROCEDURE — 93005 ELECTROCARDIOGRAM TRACING: CPT

## 2019-11-26 PROCEDURE — 83880 ASSAY OF NATRIURETIC PEPTIDE: CPT

## 2019-11-26 PROCEDURE — 93010 EKG 12-LEAD: ICD-10-PCS | Mod: 76,,, | Performed by: INTERNAL MEDICINE

## 2019-11-26 PROCEDURE — 93010 EKG 12-LEAD: ICD-10-PCS | Mod: ,,, | Performed by: INTERNAL MEDICINE

## 2019-11-26 PROCEDURE — 83605 ASSAY OF LACTIC ACID: CPT

## 2019-11-26 PROCEDURE — 93010 ELECTROCARDIOGRAM REPORT: CPT | Mod: ,,, | Performed by: INTERNAL MEDICINE

## 2019-11-26 PROCEDURE — 80053 COMPREHEN METABOLIC PANEL: CPT

## 2019-11-26 PROCEDURE — 84443 ASSAY THYROID STIM HORMONE: CPT

## 2019-11-26 PROCEDURE — 80048 BASIC METABOLIC PNL TOTAL CA: CPT

## 2019-11-26 PROCEDURE — 85025 COMPLETE CBC W/AUTO DIFF WBC: CPT

## 2019-11-26 PROCEDURE — 99291 CRITICAL CARE FIRST HOUR: CPT

## 2019-11-26 PROCEDURE — 85730 THROMBOPLASTIN TIME PARTIAL: CPT

## 2019-11-26 PROCEDURE — 63600175 PHARM REV CODE 636 W HCPCS: Performed by: EMERGENCY MEDICINE

## 2019-11-26 PROCEDURE — 80162 ASSAY OF DIGOXIN TOTAL: CPT

## 2019-11-26 PROCEDURE — G0378 HOSPITAL OBSERVATION PER HR: HCPCS

## 2019-11-26 PROCEDURE — 99291 PR CRITICAL CARE, E/M 30-74 MINUTES: ICD-10-PCS | Mod: ,,, | Performed by: EMERGENCY MEDICINE

## 2019-11-26 PROCEDURE — 99215 OFFICE O/P EST HI 40 MIN: CPT | Mod: GC,,, | Performed by: INTERNAL MEDICINE

## 2019-11-26 PROCEDURE — 63600175 PHARM REV CODE 636 W HCPCS: Performed by: STUDENT IN AN ORGANIZED HEALTH CARE EDUCATION/TRAINING PROGRAM

## 2019-11-26 PROCEDURE — 99215 PR OFFICE/OUTPT VISIT, EST, LEVL V, 40-54 MIN: ICD-10-PCS | Mod: GC,,, | Performed by: INTERNAL MEDICINE

## 2019-11-26 PROCEDURE — 63600175 PHARM REV CODE 636 W HCPCS: Performed by: INTERNAL MEDICINE

## 2019-11-26 PROCEDURE — 85610 PROTHROMBIN TIME: CPT

## 2019-11-26 PROCEDURE — 93010 ELECTROCARDIOGRAM REPORT: CPT | Mod: 76,,, | Performed by: INTERNAL MEDICINE

## 2019-11-26 PROCEDURE — 84484 ASSAY OF TROPONIN QUANT: CPT | Mod: 91

## 2019-11-26 PROCEDURE — 84484 ASSAY OF TROPONIN QUANT: CPT

## 2019-11-26 PROCEDURE — 82962 GLUCOSE BLOOD TEST: CPT

## 2019-11-26 RX ORDER — BISACODYL 5 MG
5 TABLET, DELAYED RELEASE (ENTERIC COATED) ORAL DAILY PRN
Status: DISCONTINUED | OUTPATIENT
Start: 2019-11-26 | End: 2019-11-27 | Stop reason: HOSPADM

## 2019-11-26 RX ORDER — DILTIAZEM HYDROCHLORIDE 120 MG/1
120 CAPSULE, COATED, EXTENDED RELEASE ORAL DAILY
Status: DISCONTINUED | OUTPATIENT
Start: 2019-11-27 | End: 2019-11-27 | Stop reason: HOSPADM

## 2019-11-26 RX ORDER — FLUTICASONE FUROATE AND VILANTEROL 100; 25 UG/1; UG/1
1 POWDER RESPIRATORY (INHALATION) DAILY
Status: DISCONTINUED | OUTPATIENT
Start: 2019-11-26 | End: 2019-11-27 | Stop reason: HOSPADM

## 2019-11-26 RX ORDER — PANTOPRAZOLE SODIUM 40 MG/1
40 TABLET, DELAYED RELEASE ORAL DAILY
Status: DISCONTINUED | OUTPATIENT
Start: 2019-11-26 | End: 2019-11-27 | Stop reason: HOSPADM

## 2019-11-26 RX ORDER — ONDANSETRON 4 MG/1
4 TABLET, ORALLY DISINTEGRATING ORAL EVERY 6 HOURS PRN
Status: DISCONTINUED | OUTPATIENT
Start: 2019-11-26 | End: 2019-11-27 | Stop reason: HOSPADM

## 2019-11-26 RX ORDER — INSULIN ASPART 100 [IU]/ML
0-5 INJECTION, SOLUTION INTRAVENOUS; SUBCUTANEOUS
Status: DISCONTINUED | OUTPATIENT
Start: 2019-11-26 | End: 2019-11-27 | Stop reason: HOSPADM

## 2019-11-26 RX ORDER — ISOSORBIDE MONONITRATE 30 MG/1
30 TABLET, EXTENDED RELEASE ORAL DAILY
Status: DISCONTINUED | OUTPATIENT
Start: 2019-11-26 | End: 2019-11-27 | Stop reason: HOSPADM

## 2019-11-26 RX ORDER — LANOLIN ALCOHOL/MO/W.PET/CERES
1000 CREAM (GRAM) TOPICAL DAILY
Status: DISCONTINUED | OUTPATIENT
Start: 2019-11-26 | End: 2019-11-27 | Stop reason: HOSPADM

## 2019-11-26 RX ORDER — GLUCAGON 1 MG
1 KIT INJECTION
Status: DISCONTINUED | OUTPATIENT
Start: 2019-11-26 | End: 2019-11-27 | Stop reason: HOSPADM

## 2019-11-26 RX ORDER — NITROGLYCERIN 0.4 MG/1
0.4 TABLET SUBLINGUAL EVERY 5 MIN PRN
Status: DISCONTINUED | OUTPATIENT
Start: 2019-11-26 | End: 2019-11-27 | Stop reason: HOSPADM

## 2019-11-26 RX ORDER — SODIUM CHLORIDE 9 MG/ML
1000 INJECTION, SOLUTION INTRAVENOUS
Status: COMPLETED | OUTPATIENT
Start: 2019-11-26 | End: 2019-11-26

## 2019-11-26 RX ORDER — IBUPROFEN 200 MG
16 TABLET ORAL
Status: DISCONTINUED | OUTPATIENT
Start: 2019-11-26 | End: 2019-11-27 | Stop reason: HOSPADM

## 2019-11-26 RX ORDER — ISOSORBIDE MONONITRATE 30 MG/1
30 TABLET, EXTENDED RELEASE ORAL DAILY
Status: DISCONTINUED | OUTPATIENT
Start: 2019-11-27 | End: 2019-11-26

## 2019-11-26 RX ORDER — AZELASTINE 1 MG/ML
1 SPRAY, METERED NASAL 2 TIMES DAILY
Status: DISCONTINUED | OUTPATIENT
Start: 2019-11-26 | End: 2019-11-27 | Stop reason: HOSPADM

## 2019-11-26 RX ORDER — DULOXETIN HYDROCHLORIDE 20 MG/1
20 CAPSULE, DELAYED RELEASE ORAL 2 TIMES DAILY
Status: DISCONTINUED | OUTPATIENT
Start: 2019-11-26 | End: 2019-11-27 | Stop reason: HOSPADM

## 2019-11-26 RX ORDER — ZAFIRLUKAST 20 MG/1
20 TABLET, FILM COATED ORAL DAILY
Status: DISCONTINUED | OUTPATIENT
Start: 2019-11-27 | End: 2019-11-27 | Stop reason: HOSPADM

## 2019-11-26 RX ORDER — ACETAMINOPHEN 325 MG/1
650 TABLET ORAL EVERY 6 HOURS PRN
Status: DISCONTINUED | OUTPATIENT
Start: 2019-11-26 | End: 2019-11-27 | Stop reason: HOSPADM

## 2019-11-26 RX ORDER — CLOPIDOGREL BISULFATE 75 MG/1
75 TABLET ORAL DAILY
Status: DISCONTINUED | OUTPATIENT
Start: 2019-11-27 | End: 2019-11-27 | Stop reason: HOSPADM

## 2019-11-26 RX ORDER — SODIUM CHLORIDE 9 MG/ML
500 INJECTION, SOLUTION INTRAVENOUS
Status: COMPLETED | OUTPATIENT
Start: 2019-11-26 | End: 2019-11-26

## 2019-11-26 RX ORDER — GABAPENTIN 300 MG/1
600 CAPSULE ORAL 3 TIMES DAILY
Status: DISCONTINUED | OUTPATIENT
Start: 2019-11-26 | End: 2019-11-27 | Stop reason: HOSPADM

## 2019-11-26 RX ORDER — ASCORBIC ACID 500 MG
500 TABLET ORAL DAILY
Status: DISCONTINUED | OUTPATIENT
Start: 2019-11-26 | End: 2019-11-27 | Stop reason: HOSPADM

## 2019-11-26 RX ORDER — FAMOTIDINE 20 MG/1
20 TABLET, FILM COATED ORAL DAILY
Status: DISCONTINUED | OUTPATIENT
Start: 2019-11-26 | End: 2019-11-27 | Stop reason: HOSPADM

## 2019-11-26 RX ORDER — IBUPROFEN 200 MG
24 TABLET ORAL
Status: DISCONTINUED | OUTPATIENT
Start: 2019-11-26 | End: 2019-11-27 | Stop reason: HOSPADM

## 2019-11-26 RX ORDER — TRAMADOL HYDROCHLORIDE 50 MG/1
50 TABLET ORAL EVERY 6 HOURS PRN
Status: DISCONTINUED | OUTPATIENT
Start: 2019-11-26 | End: 2019-11-27 | Stop reason: HOSPADM

## 2019-11-26 RX ORDER — LISINOPRIL 20 MG/1
20 TABLET ORAL 2 TIMES DAILY
Status: DISCONTINUED | OUTPATIENT
Start: 2019-11-26 | End: 2019-11-26

## 2019-11-26 RX ORDER — ALBUTEROL SULFATE 90 UG/1
2 AEROSOL, METERED RESPIRATORY (INHALATION) EVERY 6 HOURS PRN
Status: DISCONTINUED | OUTPATIENT
Start: 2019-11-26 | End: 2019-11-27 | Stop reason: HOSPADM

## 2019-11-26 RX ADMIN — SODIUM CHLORIDE 1000 ML: 0.9 INJECTION, SOLUTION INTRAVENOUS at 04:11

## 2019-11-26 RX ADMIN — TRAMADOL HYDROCHLORIDE 50 MG: 50 TABLET, FILM COATED ORAL at 05:11

## 2019-11-26 RX ADMIN — DULOXETINE HYDROCHLORIDE 20 MG: 20 CAPSULE, DELAYED RELEASE ORAL at 08:11

## 2019-11-26 RX ADMIN — GABAPENTIN 600 MG: 300 CAPSULE ORAL at 04:11

## 2019-11-26 RX ADMIN — GABAPENTIN 600 MG: 300 CAPSULE ORAL at 08:11

## 2019-11-26 RX ADMIN — SODIUM CHLORIDE 500 ML: 0.9 INJECTION, SOLUTION INTRAVENOUS at 04:11

## 2019-11-26 RX ADMIN — NITROGLYCERIN 0.4 MG: 0.4 TABLET SUBLINGUAL at 08:11

## 2019-11-26 NOTE — ED PROVIDER NOTES
Encounter Date: 11/26/2019       History     Chief Complaint   Patient presents with    Loss of Consciousness     multiple pauses of asystole en route with [facility] after syncope     74F w/ Hx AVR (on anticoagulation), Afib, CAD, as well as Hx as below BIBEMS after syncope. Pt reports fixing her hair in the bathroom and otherwise has no recollection of events, remembers waking on the floor in the bathroom w/ family above her. EMS reports finding pt w/ significant bradycardia w/ long pauses, improved once stimulated in the EMS truck. POCT 80's. Sats normal. And en route normal mental status. Pt endorses feeling abdominal cramping yesterday evening, but otherwise denies chest pain, palpitations, leg swelling, recent prolonged immobility, fever, chills, HA. She does endorse feeling lightheaded at the moment.         Review of patient's allergies indicates:   Allergen Reactions    Celebrex [celecoxib] Shortness Of Breath    Ciprofloxacin Swelling     lip    Dicyclomine Hives    Adhesive Dermatitis    Avelox [moxifloxacin] Swelling    Cilostazol Other (See Comments)     Elevates blood pressure    Eryc [erythromycin] Other (See Comments)     unknown    Iodine and iodide containing products Hives    Keflex [cephalexin] Hives    Meclomen      rashes    Meloxicam      Ear ringing    Metoclopramide Other (See Comments)     High blood pressure    Sulfa (sulfonamide antibiotics) Itching     Past Medical History:   Diagnosis Date    Acute on chronic diastolic (congestive) heart failure 11/20/2019    Anticoagulant long-term use     on Plavix since May 2015    Anxiety     Arthritis     Asthma     Atrial fibrillation     Cataracts, bilateral     Chest pain, atypical     Colon polyp     Coronary artery disease     Diabetes mellitus     Dry eyes     Esophageal erosions     GERD (gastroesophageal reflux disease)     Heart murmur     Hemorrhoid     High cholesterol     Hypertension     Irritable bowel  syndrome     Shingles 2015    TIA (transient ischemic attack)     Use of cane as ambulatory aid      Past Surgical History:   Procedure Laterality Date    ABDOMINAL SURGERY      angiocele      2007, 2014    AORTIC VALVE REPLACEMENT N/A 11/19/2019    Procedure: Replacement-valve-aortic;  Surgeon: Jack Capone MD;  Location: [facility] CATH LAB;  Service: Cardiology;  Laterality: N/A;    BREAST SURGERY      left--- a lump--- no cancer    COLONOSCOPY  2014    COLONOSCOPY N/A 3/14/2018    Procedure: COLONOSCOPY;  Surgeon: Efren Kemp MD;  Location: [facility] ENDO (4TH FLR);  Service: Endoscopy;  Laterality: N/A;  ok to hold Plavix 5 days prior to procedure per Dr RESHMA Hernandez     ok per Dr Kemp to hold Eliquis 2 days prior to procedure (see telephone encounter dated-2/7/18)    HERNIA REPAIR      HYSTERECTOMY  partial    1982 partial hysterectomy    LEFT HEART CATHETERIZATION Right 11/5/2019    Procedure: Left heart cath;  Surgeon: Jack Capone MD;  Location: [facility] CATH LAB;  Service: Cardiology;  Laterality: Right;    left nasal polyp      left neck lymph node      nose polyp      right hip fatty mass tissue      stent to small intestine      SUPERIOR VENA CAVA ANGIOPLASTY / STENTING      TONSILLECTOMY      TOTAL ABDOMINAL HYSTERECTOMY      2014    TOTAL KNEE ARTHROPLASTY Bilateral     UPPER GASTROINTESTINAL ENDOSCOPY  2014     Family History   Problem Relation Age of Onset    Colon cancer Neg Hx     Inflammatory bowel disease Neg Hx      Social History     Tobacco Use    Smoking status: Never Smoker    Smokeless tobacco: Never Used   Substance Use Topics    Alcohol use: No    Drug use: No     Review of Systems   Unable to perform ROS: Acuity of condition       Physical Exam     Initial Vitals   BP Pulse Resp Temp SpO2   11/26/19 1208 11/26/19 1208 11/26/19 1208 11/26/19 1700 11/26/19 1208   135/62 70 15 98.3 °F (36.8 °C) 100 %      MAP       --                Physical Exam    Nursing note and vitals  reviewed.  Constitutional: No distress.   Pale   HENT:   Head: Normocephalic and atraumatic.   No obvious abrasions/hematoma/lacerations on initial arrival.    Eyes: Conjunctivae are normal. No scleral icterus.   Neck: Normal range of motion.   Cardiovascular: Intact distal pulses.   Murmur heard.  Bradycardia, irregular   Pulmonary/Chest: Breath sounds normal. No stridor. No respiratory distress. She has no wheezes. She has no rales.   Abdominal: Soft. She exhibits no distension.   Musculoskeletal: Normal range of motion. She exhibits no edema.   Neurological: She is alert and oriented to person, place, and time.   AOx4. Normal strength upper and lower extremities. Symmetrical face. Adequate speech. No sensation decifits. No cervical spine tenderness.    Skin: Skin is warm.   Cold extremities, no cyanosis   Psychiatric: She has a normal mood and affect. Her behavior is normal.         ED Course   Critical Care  Date/Time: 11/26/2019 2:43 PM  Performed by: Salo Fairchild DO  Authorized by: Salo Fairchild DO   Direct patient critical care time: 20 minutes  Additional history critical care time: 10 minutes  Ordering / reviewing critical care time: 10 minutes  Documentation critical care time: 10 minutes  Consulting other physicians critical care time: 10 minutes  Total critical care time (exclusive of procedural time) : 60 minutes  Critical care was necessary to treat or prevent imminent or life-threatening deterioration of the following conditions: cardiac failure (Cardiac arrhythmia).  Critical care was time spent personally by me on the following activities: development of treatment plan with patient or surrogate, discussions with consultants, evaluation of patient's response to treatment, examination of patient, obtaining history from patient or surrogate, ordering and performing treatments and interventions, ordering and review of laboratory studies, ordering and review of radiographic studies, pulse  oximetry, re-evaluation of patient's condition and review of old charts.        Labs Reviewed   CBC W/ AUTO DIFFERENTIAL - Abnormal; Notable for the following components:       Result Value    WBC 12.99 (*)     Mean Corpuscular Hemoglobin 31.6 (*)     RDW 14.8 (*)     Immature Granulocytes 1.1 (*)     Gran # (ANC) 8.2 (*)     Immature Grans (Abs) 0.14 (*)     Mono # 1.1 (*)     All other components within normal limits   COMPREHENSIVE METABOLIC PANEL - Abnormal; Notable for the following components:    Potassium 5.6 (*)     Glucose 166 (*)     Alkaline Phosphatase 47 (*)     AST 51 (*)     eGFR if  50.7 (*)     eGFR if non  44.0 (*)     All other components within normal limits   TROPONIN I - Abnormal; Notable for the following components:    Troponin I 0.027 (*)     All other components within normal limits   APTT - Abnormal; Notable for the following components:    aPTT 32.5 (*)     All other components within normal limits   LACTIC ACID, PLASMA - Abnormal; Notable for the following components:    Lactate (Lactic Acid) 2.3 (*)     All other components within normal limits   B-TYPE NATRIURETIC PEPTIDE - Abnormal; Notable for the following components:     (*)     All other components within normal limits   POCT GLUCOSE - Abnormal; Notable for the following components:    POCT Glucose 177 (*)     All other components within normal limits   ISTAT PROCEDURE - Abnormal; Notable for the following components:    POC Glucose 179 (*)     POC BUN 31 (*)     POC Sodium 132 (*)     POC Potassium 6.5 (*)     POC Ionized Calcium 0.89 (*)     All other components within normal limits   MAGNESIUM   TSH   PROTIME-INR   DIGOXIN LEVEL   DIGOXIN LEVEL   DRUG SCREEN PANEL, URINE EMERGENCY   URINALYSIS, REFLEX TO URINE CULTURE   POCT GLUCOSE MONITORING CONTINUOUS   ISTAT CHEM8   POCT GLUCOSE MONITORING CONTINUOUS       EKG Readings: (Independently Interpreted)   Initial Reading: No STEMI. Previous  EKG: Compared with most recent EKG Rhythm: Atrial Fibrillation. Heart Rate: 66. ST Segments: Non-Specific ST Segment Depression.     ECG Results          EKG 12-lead (Final result)  Result time 11/26/19 12:50:55    Final result by Keagan, Lab In [facility] (11/26/19 12:50:55)                 Narrative:    Test Reason : R55,    Vent. Rate : 066 BPM     Atrial Rate : 057 BPM     P-R Int : 000 ms          QRS Dur : 086 ms      QT Int : 414 ms       P-R-T Axes : 000 023 127 degrees     QTc Int : 434 ms    Atrial fibrillation  Nonspecific ST and T wave abnormality  Abnormal ECG  When compared with ECG of 20-NOV-2019 03:57,  No significant change was found  Confirmed by Josh Hernandez MD ([id number]) on 11/26/2019 12:50:44 PM    Referred By: DOT   SELF           Confirmed By:Josh Hernandez MD                            Imaging Results          X-Ray Chest 1 View (Final result)  Result time 11/26/19 12:51:49    Final result by Oleksandr Leon III, MD (11/26/19 12:51:49)                 Impression:      No acute process seen.      Electronically signed by: Oleksandr Leon MD  Date:    11/26/2019  Time:    12:51             Narrative:    EXAMINATION:  XR CHEST 1 VIEW    CLINICAL HISTORY:  Syncope and collapse    FINDINGS:  One view: There is a valve repair.  Heart size is normal lungs are clear and the bones bowel gas noncontributory.                              X-Rays:   Independently Interpreted Readings:   Chest X-Ray: Normal heart size.  No infiltrates.  No acute abnormalities.     Medical Decision Making:   History:   I obtained history from: EMS provider.  Old Medical Records: I decided to obtain old medical records.  Old Records Summarized: records from clinic visits and records from previous admission(s).  Independently Interpreted Test(s):   I have ordered and independently interpreted X-rays - see prior notes.  I have ordered and independently interpreted EKG Reading(s) - see prior notes  Clinical Tests:   Lab  Tests: Ordered and Reviewed  Radiological Study: Ordered and Reviewed  Medical Tests: Ordered and Reviewed  ED Management:  House Officer Medical Decision Making:    Known Hx of atrial fib as well as AVR evaluated for syncopal episode in the setting of bradycardia per EMS cardiac tracing, presenting w/ Afib at rate of 60's, otherwise stable blood pressure and benign neurologic exam on arrival. On head-toe examination pt does not appear to have evidence of significant trauma. Suspicion for cardiogenic syncope given pt's known Hx, w/ concern that bradycardia may require placement of pacemaker. Bloodwork obtained to evaluate shows lactic acid of 2.3, which may reflect poor perfusion, as well as Cr elevation. Of note, Tn obtained is .027, and EKG shows Afib but no ST/TW changes in new regional pattern to suggest stemi or pattern of ischemia/strain. TSH is normal. Pt consulted to cardiology for evaluation. During observation, no further episodes of bradycardia, and remains hemodynamically stable. Cardiology to admit pt for further evaluation for possible ICD placement.     Bradley Lebron MD  House Officer, PGY-4  Emergency Medicine  11/26/2019 12:20 PM  Other:   I have discussed this case with another health care provider.       <> Summary of the Discussion: Cardiology              Attending Attestation:   Physician Attestation Statement for Resident:  As the supervising MD   Physician Attestation Statement: I have personally seen and examined this patient.   I agree with the above history. -:   As the supervising MD I agree with the above PE.    As the supervising MD I agree with the above treatment, course, plan, and disposition.            I have reviewed and concur with the resident's history, physical, assessment, and plan.  I have personally interviewed and examined the patient at bedside. Critical care time was spent personally by me evaluating the patient's organ dysfunction, developing treatment plan with the  ICU, discussing treatment plan with patient and caregivers, discussion with all consultants, evaluation of patient's response to treatment, examination of patient, ordering performed treatments and interventions, ordering and reviewing laboratory studies, ordering and reviewing radiographic studies, and re-evaluating patient's condition.    Complexity: critical care    Final diagnoses:  [R55] Syncope  [R00.1] Bradycardia (Primary)          Salo Fairchild DO    Dept of Emergency Medicine   [facility]  Spectralink: [id number]                          Clinical Impression:       ICD-10-CM ICD-9-CM   1. Bradycardia R00.1 427.89   2. Syncope R55 780.2   3. Chest pain R07.9 786.50         Disposition:   Disposition: Placed in Observation  Condition: Serious                     OhRonny Wilson MD  Resident  11/26/19 1444       Salo Fairchild DO  11/27/19 1533

## 2019-11-26 NOTE — ED TRIAGE NOTES
Adriana Strong, an 74 y.o. female presents to the ED after a syncopal episode while doing her hair.  When EMS arrived, patient's rate was in the 30's with pauses and periods of asystole in which her eyes would roll back into her head and she'd tense up.  Patient had a pacemaker at one time but it was removed.  Extensive cardiac history.  Patient is pale on arrival and has no recollection of the events.        Review of patient's allergies indicates:   Allergen Reactions    Celebrex [celecoxib] Shortness Of Breath    Ciprofloxacin Swelling     lip    Dicyclomine Hives    Adhesive Dermatitis    Avelox [moxifloxacin] Swelling    Cilostazol Other (See Comments)     Elevates blood pressure    Eryc [erythromycin] Other (See Comments)     unknown    Iodine and iodide containing products Hives    Keflex [cephalexin] Hives    Meclomen      rashes    Meloxicam      Ear ringing    Metoclopramide Other (See Comments)     High blood pressure    Sulfa (sulfonamide antibiotics) Itching     Chief Complaint   Patient presents with    Loss of Consciousness     multiple pauses of asystole en route with [facility] after syncope     Past Medical History:   Diagnosis Date    Acute on chronic diastolic (congestive) heart failure 11/20/2019    Anticoagulant long-term use     on Plavix since May 2015    Anxiety     Arthritis     Asthma     Atrial fibrillation     Cataracts, bilateral     Chest pain, atypical     Colon polyp     Coronary artery disease     Diabetes mellitus     Dry eyes     Esophageal erosions     GERD (gastroesophageal reflux disease)     Heart murmur     Hemorrhoid     High cholesterol     Hypertension     Irritable bowel syndrome     Shingles 2015    TIA (transient ischemic attack)     Use of cane as ambulatory aid

## 2019-11-26 NOTE — H&P
[facility]  Interventional Cardiology  H&P    Patient Name: Adriana Strong  MRN: [id number]  Admission Date: 11/26/2019  Code Status: Full Code   Attending Provider: Jack Capone MD   Primary Care Physician: Krzysztof Mcgraw MD  Principal Problem:Syncope    Patient information was obtained from patient and ER records.     Subjective:     Chief Complaint:  syncope     HPI:  74. y.o. female who presents for syncope.  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   She underwent TAVR-S3 via TF approach last week. Patient reports that today after walking up stairs to her bathroom-she passed out while fixing her hair. + LOC. Cannot determine if there was head trauma, but does admit to a headache currently. Patient states she lost consciousness for about 5 minutes.  When she awoke she denies any confusion. Denies prodromal symptoms of nausea and dizziness. She states SOB was gradually improving following TAVR last week. She admits to a 7-10 pound weight loss in the past week. She also admits to decreased fluid intake. She denies any history of syncope in the past. EMS was called by her  and EMS reported atrial fibrillation with slow HR on tele strips in 30s-40s. On arrival here she is atrial fibrillation In 50s-60s. EKG shows atrial fibrillation. She is normotensive. Orthostatics are negative. Currently denies CP and SOB.       Past Medical History:   Diagnosis Date    Acute on chronic diastolic (congestive) heart failure 11/20/2019    Anticoagulant long-term use     on Plavix since May 2015    Anxiety     Arthritis     Asthma     Atrial fibrillation     Cataracts, bilateral     Chest pain, atypical     Colon polyp     Coronary artery disease     Diabetes mellitus     Dry eyes      Esophageal erosions     GERD (gastroesophageal reflux disease)     Heart murmur     Hemorrhoid     High cholesterol     Hypertension     Irritable bowel syndrome     Shingles 2015    TIA (transient ischemic attack)     Use of cane as ambulatory aid        Past Surgical History:   Procedure Laterality Date    ABDOMINAL SURGERY      angiocele      2007, 2014    AORTIC VALVE REPLACEMENT N/A 11/19/2019    Procedure: Replacement-valve-aortic;  Surgeon: Jack Capone MD;  Location: [facility] CATH LAB;  Service: Cardiology;  Laterality: N/A;    BREAST SURGERY      left--- a lump--- no cancer    COLONOSCOPY  2014    COLONOSCOPY N/A 3/14/2018    Procedure: COLONOSCOPY;  Surgeon: Efren Kemp MD;  Location: [facility] ENDO (4TH FLR);  Service: Endoscopy;  Laterality: N/A;  ok to hold Plavix 5 days prior to procedure per Dr RESHMA Hernandez     ok per Dr Kemp to hold Eliquis 2 days prior to procedure (see telephone encounter dated-2/7/18)    HERNIA REPAIR      HYSTERECTOMY  partial    1982 partial hysterectomy    LEFT HEART CATHETERIZATION Right 11/5/2019    Procedure: Left heart cath;  Surgeon: Jack Capone MD;  Location: [facility] CATH LAB;  Service: Cardiology;  Laterality: Right;    left nasal polyp      left neck lymph node      nose polyp      right hip fatty mass tissue      stent to small intestine      SUPERIOR VENA CAVA ANGIOPLASTY / STENTING      TONSILLECTOMY      TOTAL ABDOMINAL HYSTERECTOMY      2014    TOTAL KNEE ARTHROPLASTY Bilateral     UPPER GASTROINTESTINAL ENDOSCOPY  2014       Review of patient's allergies indicates:   Allergen Reactions    Celebrex [celecoxib] Shortness Of Breath    Ciprofloxacin Swelling     lip    Dicyclomine Hives    Adhesive Dermatitis    Avelox [moxifloxacin] Swelling    Cilostazol Other (See Comments)     Elevates blood pressure    Eryc [erythromycin] Other (See Comments)     unknown    Iodine and iodide containing products Hives    Keflex [cephalexin] Hives     Meclomen      rashes    Meloxicam      Ear ringing    Metoclopramide Other (See Comments)     High blood pressure    Sulfa (sulfonamide antibiotics) Itching         (Not in a hospital admission)  Family History     None        Tobacco Use    Smoking status: Never Smoker    Smokeless tobacco: Never Used   Substance and Sexual Activity    Alcohol use: No    Drug use: No    Sexual activity: Not on file     Review of Systems   Constitution: Positive for weight loss. Negative for chills and fever.   HENT: Negative for sore throat.    Eyes: Negative for blurred vision.   Cardiovascular: Positive for irregular heartbeat and syncope. Negative for chest pain, dyspnea on exertion, leg swelling, orthopnea and palpitations.   Respiratory: Negative for cough and sputum production.    Hematologic/Lymphatic: Does not bruise/bleed easily.   Skin: Negative for itching, rash and suspicious lesions.   Musculoskeletal: Negative for falls.   Gastrointestinal: Negative for abdominal pain and heartburn.   Genitourinary: Negative for non-menstrual bleeding.   Neurological: Positive for dizziness.   Psychiatric/Behavioral: Negative for altered mental status.     Objective:     Vital Signs (Most Recent):  Pulse: 73 (11/26/19 1533)  Resp: 15 (11/26/19 1533)  BP: (!) 142/63 (11/26/19 1533)  SpO2: 99 % (11/26/19 1533) Vital Signs (24h Range):  Pulse:  [65-75] 73  Resp:  [15-18] 15  SpO2:  [96 %-100 %] 99 %  BP: (135-165)/(62-66) 142/63     Weight: 103.4 kg (228 lb)  Body mass index is 39.14 kg/m².    SpO2: 99 %       No intake or output data in the 24 hours ending 11/26/19 1632    Lines/Drains/Airways     Peripheral Intravenous Line                 Peripheral IV - Single Lumen 11/26/19 1205 18 G Right Upper Arm less than 1 day         Peripheral IV - Single Lumen 11/26/19 22 G Left Hand less than 1 day                Physical Exam   Constitutional: She is oriented to person, place, and time. She appears well-developed and  well-nourished. No distress.   HENT:   Head: Normocephalic and atraumatic.   Eyes: EOM are normal.   Neck: Normal range of motion.   Cardiovascular: An irregular rhythm present. Bradycardia present.   Murmur heard.   Harsh midsystolic murmur is present with a grade of 1/6 at the upper right sternal border radiating to the neck.  Pulses:       Carotid pulses are 2+ on the right side, and 2+ on the left side.       Radial pulses are 2+ on the right side, and 2+ on the left side.        Femoral pulses are 2+ on the right side, and 2+ on the left side.       Popliteal pulses are 2+ on the right side, and 2+ on the left side.        Dorsalis pedis pulses are 2+ on the right side, and 2+ on the left side.        Posterior tibial pulses are 2+ on the right side, and 2+ on the left side.   Pulmonary/Chest: Effort normal and breath sounds normal. No respiratory distress.   Neurological: She is alert and oriented to person, place, and time.   Skin: She is not diaphoretic.   Nursing note and vitals reviewed.      Significant Labs:   BMP:   Recent Labs   Lab 11/26/19  1213   *      K 5.6*   CL 99   CO2 25   BUN 23   CREATININE 1.2   CALCIUM 9.7   MG 1.8   , CMP   Recent Labs   Lab 11/26/19  1213      K 5.6*   CL 99   CO2 25   *   BUN 23   CREATININE 1.2   CALCIUM 9.7   PROT 7.5   ALBUMIN 3.7   BILITOT 0.7   ALKPHOS 47*   AST 51*   ALT 32   ANIONGAP 12   ESTGFRAFRICA 50.7*   EGFRNONAA 44.0*   , CBC   Recent Labs   Lab 11/26/19  1213 11/26/19  1219   WBC 12.99*  --    HGB 13.5  --    HCT 41.9 42     --    , INR   Recent Labs   Lab 11/26/19  1213   INR 1.1    and All pertinent lab results from the last 24 hours have been reviewed.    Significant Imaging: X-Ray: CXR: X-Ray Chest 1 View (CXR):   Results for orders placed or performed during the hospital encounter of 11/26/19   X-Ray Chest 1 View    Narrative    EXAMINATION:  XR CHEST 1 VIEW    CLINICAL HISTORY:  Syncope and collapse    FINDINGS:  One  view: There is a valve repair.  Heart size is normal lungs are clear and the bones bowel gas noncontributory.      Impression    No acute process seen.      Electronically signed by: Oleksandr Leon MD  Date:    11/26/2019  Time:    12:51     Assessment and Plan:     * Syncope  - cardiac arrhythmia vs hypotension episode  - hold digoxin, decrease cardizem by half  - EP consulted in ED  - continue telemetry monitoring   - fluid hydration done in the ED  - fall risk     Atrial fibrillation, chronic  - history of chronic afib with low ventricular rate  - EP consulted in the ED  - cardizem decreased by half, hold digoxin  - digoxin levels ordered  - continue telemetry monitoring   - holding Eliquis until CT head is complete    S/P TAVR (transcatheter aortic valve replacement)  - S/p successful placement of a 26 S3 TAVR  on 11/20/19    TACOS (acute kidney injury)  - Cr 1.2 on admission. Baseline 0.8.   - hold lisinopril, decrease cardizem by half, hold digoxin   - check digoxin level  - received 1L NS  - BMP in AM  - avoid nephrotoxic agents    Headache  - patient cannot recall head trauma. +LOC  - CT head without contrast ordered    Morbid obesity  - BMI 39.4  - encourage lifestyle changes, diet, and exercise    Essential hypertension  - holding BP medicines  - patient hypotensive on admission and fluids were given in ED    Controlled type 2 diabetes mellitus with diabetic polyneuropathy, without long-term current use of insulin  - resume home meds  - sliding scale insulin ordered        VTE Risk Mitigation (From admission, onward)    None          Danii Olivia PA-C  Interventional Cardiology   [facility]    Staff:  I have personally taken the history and examined this patient and agree with the fellow's note as stated above and amended it accordingly :-)     This nice lady post TAVR has multiple complaints.  The syncope is unexplained, but probably vasodepressor syncope (per Chencho Moeller).  The  chest pain is costochondritis and chronic with point tenderness.  The low back pain is chronic.      She has no arrhythmias other than rate controlled AF and is stable for discharge without limitations today.

## 2019-11-26 NOTE — ASSESSMENT & PLAN NOTE
- Cr 1.2 on admission. Baseline 0.8.   - hold lisinopril, decrease cardizem by half, hold digoxin   - check digoxin level  - received 1L NS  - BMP in AM  - avoid nephrotoxic agents

## 2019-11-26 NOTE — ED NOTES
Telemetry Verification   Patient placed on Telemetry Box  Verified by   Box #  [id number]   Monitor Tech    Rate    Rhythm

## 2019-11-26 NOTE — ASSESSMENT & PLAN NOTE
- cardiac arrhythmia vs hypotension episode  - hold digoxin, decrease cardizem by half  - EP consulted in ED  - continue telemetry monitoring   - fluid hydration done in the ED  - fall risk

## 2019-11-26 NOTE — ED NOTES
Cardiology at bedside.  Patient anxious.  Advised that we need a urine sample whenever she's ready.  Patient given a warm blanket and socks for her comfort.  Family is at bedside.

## 2019-11-26 NOTE — HPI
Adriana Strong is a pleasant 74. y.o. 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   She underwent a 26mm Phan S3 TAVR on 11/19/2019. Reports feeling well overall but poor PO intake since she was told to watch her fluid intake. Reports walking up her stairs with chest pain half way up which is her baseline. She was brushing her hair when she had a syncopal episode unsure how long she was unconscious for. No prodromal symptoms. Some confusion after but quickly back to her baseline. EMS strip with what appears to be afib with slow ventricular response. Workup showing ECG with afib and rate in 70s. On dilt 240 daily and dig 125 daily. In ED, HD stable. WBC mildly elevated. Lactic 2.6. Cr up to 1.2 from 0.7 with mild hyperkalemia.    · Normal left ventricular systolic function. The estimated ejection fraction is 60% (QEF 62%)  · Concentric left ventricular remodeling.  · Biatrial enlargement.  · No wall motion abnormalities.  · Normal right ventricular systolic function.  · Aortic valve area is 0.80 cm2; peak velocity is 4.1 m/s; mean gradient is 42 mmHg.  · Severe aortic valve stenosis.  · Mild tricuspid regurgitation.  · The estimated PA systolic pressure is 39 mm Hg  · Normal central venous pressure (3 mm Hg).  · Atrial fibrillation observed.

## 2019-11-26 NOTE — ASSESSMENT & PLAN NOTE
- history of chronic afib with low ventricular rate  - EP consulted in the ED  - cardizem decreased by half, hold digoxin  - digoxin levels ordered  - continue telemetry monitoring   - holding Eliquis until CT head is complete

## 2019-11-26 NOTE — SUBJECTIVE & OBJECTIVE
Past Medical History:   Diagnosis Date    Acute on chronic diastolic (congestive) heart failure 11/20/2019    Anticoagulant long-term use     on Plavix since May 2015    Anxiety     Arthritis     Asthma     Atrial fibrillation     Cataracts, bilateral     Chest pain, atypical     Colon polyp     Coronary artery disease     Diabetes mellitus     Dry eyes     Esophageal erosions     GERD (gastroesophageal reflux disease)     Heart murmur     Hemorrhoid     High cholesterol     Hypertension     Irritable bowel syndrome     Shingles 2015    TIA (transient ischemic attack)     Use of cane as ambulatory aid        Past Surgical History:   Procedure Laterality Date    ABDOMINAL SURGERY      angiocele      2007, 2014    AORTIC VALVE REPLACEMENT N/A 11/19/2019    Procedure: Replacement-valve-aortic;  Surgeon: Jack Capone MD;  Location: [facility] CATH LAB;  Service: Cardiology;  Laterality: N/A;    BREAST SURGERY      left--- a lump--- no cancer    COLONOSCOPY  2014    COLONOSCOPY N/A 3/14/2018    Procedure: COLONOSCOPY;  Surgeon: Efren Kemp MD;  Location: [facility] ENDO ([address] [place] [address]);  Service: Endoscopy;  Laterality: N/A;  ok to hold Plavix 5 days prior to procedure per Dr RESHMA Hernandez     ok per Dr Kemp to hold Eliquis 2 days prior to procedure (see telephone encounter dated-2/7/18)    HERNIA REPAIR      HYSTERECTOMY  partial    1982 partial hysterectomy    LEFT HEART CATHETERIZATION Right 11/5/2019    Procedure: Left heart cath;  Surgeon: Jack Capone MD;  Location: [facility] CATH LAB;  Service: Cardiology;  Laterality: Right;    left nasal polyp      left neck lymph node      nose polyp      right hip fatty mass tissue      stent to small intestine      SUPERIOR VENA CAVA ANGIOPLASTY / STENTING      TONSILLECTOMY      TOTAL ABDOMINAL HYSTERECTOMY      2014    TOTAL KNEE ARTHROPLASTY Bilateral     UPPER GASTROINTESTINAL ENDOSCOPY  2014       Review of patient's allergies indicates:   Allergen  Reactions    Celebrex [celecoxib] Shortness Of Breath    Ciprofloxacin Swelling     lip    Dicyclomine Hives    Adhesive Dermatitis    Avelox [moxifloxacin] Swelling    Cilostazol Other (See Comments)     Elevates blood pressure    Eryc [erythromycin] Other (See Comments)     unknown    Iodine and iodide containing products Hives    Keflex [cephalexin] Hives    Meclomen      rashes    Meloxicam      Ear ringing    Metoclopramide Other (See Comments)     High blood pressure    Sulfa (sulfonamide antibiotics) Itching         (Not in a hospital admission)  Family History     None        Tobacco Use    Smoking status: Never Smoker    Smokeless tobacco: Never Used   Substance and Sexual Activity    Alcohol use: No    Drug use: No    Sexual activity: Not on file     Review of Systems   Constitution: Positive for weight loss. Negative for chills and fever.   HENT: Negative for sore throat.    Eyes: Negative for blurred vision.   Cardiovascular: Positive for irregular heartbeat and syncope. Negative for chest pain, dyspnea on exertion, leg swelling, orthopnea and palpitations.   Respiratory: Negative for cough and sputum production.    Hematologic/Lymphatic: Does not bruise/bleed easily.   Skin: Negative for itching, rash and suspicious lesions.   Musculoskeletal: Negative for falls.   Gastrointestinal: Negative for abdominal pain and heartburn.   Genitourinary: Negative for non-menstrual bleeding.   Neurological: Positive for dizziness.   Psychiatric/Behavioral: Negative for altered mental status.     Objective:     Vital Signs (Most Recent):  Pulse: 73 (11/26/19 1533)  Resp: 15 (11/26/19 1533)  BP: (!) 142/63 (11/26/19 1533)  SpO2: 99 % (11/26/19 1533) Vital Signs (24h Range):  Pulse:  [65-75] 73  Resp:  [15-18] 15  SpO2:  [96 %-100 %] 99 %  BP: (135-165)/(62-66) 142/63     Weight: 103.4 kg (228 lb)  Body mass index is 39.14 kg/m².    SpO2: 99 %       No intake or output data in the 24 hours ending  11/26/19 1632    Lines/Drains/Airways     Peripheral Intravenous Line                 Peripheral IV - Single Lumen 11/26/19 1205 18 G Right Upper Arm less than 1 day         Peripheral IV - Single Lumen 11/26/19 22 G Left Hand less than 1 day                Physical Exam   Constitutional: She is oriented to person, place, and time. She appears well-developed and well-nourished. No distress.   HENT:   Head: Normocephalic and atraumatic.   Eyes: EOM are normal.   Neck: Normal range of motion.   Cardiovascular: An irregular rhythm present. Bradycardia present.   Murmur heard.   Harsh midsystolic murmur is present with a grade of 1/6 at the upper right sternal border radiating to the neck.  Pulses:       Carotid pulses are 2+ on the right side, and 2+ on the left side.       Radial pulses are 2+ on the right side, and 2+ on the left side.        Femoral pulses are 2+ on the right side, and 2+ on the left side.       Popliteal pulses are 2+ on the right side, and 2+ on the left side.        Dorsalis pedis pulses are 2+ on the right side, and 2+ on the left side.        Posterior tibial pulses are 2+ on the right side, and 2+ on the left side.   Pulmonary/Chest: Effort normal and breath sounds normal. No respiratory distress.   Neurological: She is alert and oriented to person, place, and time.   Skin: She is not diaphoretic.   Nursing note and vitals reviewed.      Significant Labs:   BMP:   Recent Labs   Lab 11/26/19  1213   *      K 5.6*   CL 99   CO2 25   BUN 23   CREATININE 1.2   CALCIUM 9.7   MG 1.8   , CMP   Recent Labs   Lab 11/26/19  1213      K 5.6*   CL 99   CO2 25   *   BUN 23   CREATININE 1.2   CALCIUM 9.7   PROT 7.5   ALBUMIN 3.7   BILITOT 0.7   ALKPHOS 47*   AST 51*   ALT 32   ANIONGAP 12   ESTGFRAFRICA 50.7*   EGFRNONAA 44.0*   , CBC   Recent Labs   Lab 11/26/19  1213 11/26/19  1219   WBC 12.99*  --    HGB 13.5  --    HCT 41.9 42     --    , INR   Recent Labs   Lab  11/26/19  1213   INR 1.1    and All pertinent lab results from the last 24 hours have been reviewed.    Significant Imaging: X-Ray: CXR: X-Ray Chest 1 View (CXR):   Results for orders placed or performed during the hospital encounter of 11/26/19   X-Ray Chest 1 View    Narrative    EXAMINATION:  XR CHEST 1 VIEW    CLINICAL HISTORY:  Syncope and collapse    FINDINGS:  One view: There is a valve repair.  Heart size is normal lungs are clear and the bones bowel gas noncontributory.      Impression    No acute process seen.      Electronically signed by: Oleksandr Leon MD  Date:    11/26/2019  Time:    12:51

## 2019-11-26 NOTE — HPI
74. y.o. female who presents for syncope.  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  She underwent TAVR-S3 via TF approach last week. Patient reports that today after walking up stairs to her bathroom-she passed out while fixing her hair. + LOC. Cannot determine if there was head trauma, but does admit to a headache currently. Patient states she lost consciousness for about 5 minutes.  When she awoke she denies any confusion. Denies prodromal symptoms of nausea and dizziness. She states SOB was gradually improving following TAVR last week. She admits to a 7-10 pound weight loss in the past week. She also admits to decreased fluid intake. She denies any history of syncope in the past. EMS was called by her  and EMS reported atrial fibrillation with slow HR on tele strips in 30s-40s. On arrival here she is atrial fibrillation In 50s-60s. EKG shows atrial fibrillation. She is normotensive. Orthostatics are negative. Currently denies CP and SOB.

## 2019-11-27 ENCOUNTER — CLINICAL SUPPORT (OUTPATIENT)
Dept: CARDIOLOGY | Facility: HOSPITAL | Age: 74
End: 2019-11-27
Attending: INTERNAL MEDICINE
Payer: MEDICARE

## 2019-11-27 VITALS
BODY MASS INDEX: 38.93 KG/M2 | WEIGHT: 228 LBS | HEIGHT: 64 IN | RESPIRATION RATE: 17 BRPM | TEMPERATURE: 97 F | DIASTOLIC BLOOD PRESSURE: 65 MMHG | SYSTOLIC BLOOD PRESSURE: 149 MMHG | OXYGEN SATURATION: 97 % | HEART RATE: 62 BPM

## 2019-11-27 LAB
ALBUMIN SERPL BCP-MCNC: 3.3 G/DL (ref 3.5–5.2)
ALP SERPL-CCNC: 44 U/L (ref 55–135)
ALT SERPL W/O P-5'-P-CCNC: 26 U/L (ref 10–44)
ANION GAP SERPL CALC-SCNC: 8 MMOL/L (ref 8–16)
AST SERPL-CCNC: 32 U/L (ref 10–40)
BASOPHILS # BLD AUTO: 0.05 K/UL (ref 0–0.2)
BASOPHILS NFR BLD: 0.6 % (ref 0–1.9)
BILIRUB SERPL-MCNC: 0.4 MG/DL (ref 0.1–1)
BUN SERPL-MCNC: 20 MG/DL (ref 8–23)
CALCIUM SERPL-MCNC: 9.1 MG/DL (ref 8.7–10.5)
CHLORIDE SERPL-SCNC: 102 MMOL/L (ref 95–110)
CO2 SERPL-SCNC: 27 MMOL/L (ref 23–29)
CREAT SERPL-MCNC: 0.8 MG/DL (ref 0.5–1.4)
DIFFERENTIAL METHOD: ABNORMAL
EOSINOPHIL # BLD AUTO: 0.1 K/UL (ref 0–0.5)
EOSINOPHIL NFR BLD: 1 % (ref 0–8)
ERYTHROCYTE [DISTWIDTH] IN BLOOD BY AUTOMATED COUNT: 14.9 % (ref 11.5–14.5)
EST. GFR  (AFRICAN AMERICAN): >60 ML/MIN/1.73 M^2
EST. GFR  (NON AFRICAN AMERICAN): >60 ML/MIN/1.73 M^2
GLUCOSE SERPL-MCNC: 141 MG/DL (ref 70–110)
HCT VFR BLD AUTO: 39.1 % (ref 37–48.5)
HGB BLD-MCNC: 12.4 G/DL (ref 12–16)
IMM GRANULOCYTES # BLD AUTO: 0.07 K/UL (ref 0–0.04)
IMM GRANULOCYTES NFR BLD AUTO: 0.8 % (ref 0–0.5)
LYMPHOCYTES # BLD AUTO: 2.8 K/UL (ref 1–4.8)
LYMPHOCYTES NFR BLD: 32 % (ref 18–48)
MAGNESIUM SERPL-MCNC: 1.7 MG/DL (ref 1.6–2.6)
MCH RBC QN AUTO: 31.6 PG (ref 27–31)
MCHC RBC AUTO-ENTMCNC: 31.7 G/DL (ref 32–36)
MCV RBC AUTO: 100 FL (ref 82–98)
MONOCYTES # BLD AUTO: 0.9 K/UL (ref 0.3–1)
MONOCYTES NFR BLD: 9.9 % (ref 4–15)
NEUTROPHILS # BLD AUTO: 4.8 K/UL (ref 1.8–7.7)
NEUTROPHILS NFR BLD: 55.7 % (ref 38–73)
NRBC BLD-RTO: 0 /100 WBC
PLATELET # BLD AUTO: 227 K/UL (ref 150–350)
PMV BLD AUTO: 10.2 FL (ref 9.2–12.9)
POCT GLUCOSE: 151 MG/DL (ref 70–110)
POTASSIUM SERPL-SCNC: 4.6 MMOL/L (ref 3.5–5.1)
PROT SERPL-MCNC: 6.6 G/DL (ref 6–8.4)
RBC # BLD AUTO: 3.92 M/UL (ref 4–5.4)
SODIUM SERPL-SCNC: 137 MMOL/L (ref 136–145)
WBC # BLD AUTO: 8.65 K/UL (ref 3.9–12.7)

## 2019-11-27 PROCEDURE — 99224 PR SUBSEQUENT OBSERVATION CARE,LEVEL I: CPT | Mod: GC,,, | Performed by: INTERNAL MEDICINE

## 2019-11-27 PROCEDURE — 36415 COLL VENOUS BLD VENIPUNCTURE: CPT

## 2019-11-27 PROCEDURE — 99224 PR SUBSEQUENT OBSERVATION CARE,LEVEL I: ICD-10-PCS | Mod: GC,,, | Performed by: INTERNAL MEDICINE

## 2019-11-27 PROCEDURE — 25000242 PHARM REV CODE 250 ALT 637 W/ HCPCS: Performed by: INTERNAL MEDICINE

## 2019-11-27 PROCEDURE — 93272 ECG/REVIEW INTERPRET ONLY: CPT | Mod: ,,, | Performed by: INTERNAL MEDICINE

## 2019-11-27 PROCEDURE — 25000003 PHARM REV CODE 250: Performed by: INTERNAL MEDICINE

## 2019-11-27 PROCEDURE — 83735 ASSAY OF MAGNESIUM: CPT

## 2019-11-27 PROCEDURE — 93271 ECG/MONITORING AND ANALYSIS: CPT

## 2019-11-27 PROCEDURE — G0378 HOSPITAL OBSERVATION PER HR: HCPCS

## 2019-11-27 PROCEDURE — 80053 COMPREHEN METABOLIC PANEL: CPT

## 2019-11-27 PROCEDURE — 85025 COMPLETE CBC W/AUTO DIFF WBC: CPT

## 2019-11-27 PROCEDURE — 93272 CARDIAC EVENT MONITOR (CUPID ONLY): ICD-10-PCS | Mod: ,,, | Performed by: INTERNAL MEDICINE

## 2019-11-27 RX ADMIN — ISOSORBIDE MONONITRATE 30 MG: 30 TABLET, EXTENDED RELEASE ORAL at 08:11

## 2019-11-27 RX ADMIN — DILTIAZEM HYDROCHLORIDE 120 MG: 120 CAPSULE, COATED, EXTENDED RELEASE ORAL at 08:11

## 2019-11-27 RX ADMIN — ZAFIRLUKAST 20 MG: 20 TABLET, COATED ORAL at 11:11

## 2019-11-27 RX ADMIN — FLUTICASONE FUROATE AND VILANTEROL TRIFENATATE 1 PUFF: 100; 25 POWDER RESPIRATORY (INHALATION) at 08:11

## 2019-11-27 RX ADMIN — CLOPIDOGREL BISULFATE 75 MG: 75 TABLET ORAL at 08:11

## 2019-11-27 RX ADMIN — PANTOPRAZOLE SODIUM 40 MG: 40 TABLET, DELAYED RELEASE ORAL at 08:11

## 2019-11-27 RX ADMIN — DULOXETINE HYDROCHLORIDE 20 MG: 20 CAPSULE, DELAYED RELEASE ORAL at 08:11

## 2019-11-27 RX ADMIN — FAMOTIDINE 20 MG: 20 TABLET ORAL at 08:11

## 2019-11-27 RX ADMIN — CYANOCOBALAMIN TAB 1000 MCG 1000 MCG: 1000 TAB at 08:11

## 2019-11-27 RX ADMIN — GABAPENTIN 600 MG: 300 CAPSULE ORAL at 08:11

## 2019-11-27 RX ADMIN — OXYCODONE HYDROCHLORIDE AND ACETAMINOPHEN 500 MG: 500 TABLET ORAL at 08:11

## 2019-11-27 NOTE — CONSULTS
[facility]  Cardiac Electrophysiology  Consult Note    Admission Date: 11/26/2019  Code Status: Full Code   Attending Provider: Jack Capone MD  Consulting Provider: Lane Dodson MD  Principal Problem:Syncope    Inpatient consult to Electrophysiology  Consult performed by: Lane oDdson MD  Consult ordered by: Kishore Daly MD        Subjective:     Chief Complaint:  S/p TAVR    HPI:   Adriana Strong is a pleasant 74. y.o. 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   She underwent a 26mm Phan S3 TAVR on 11/19/2019. Reports feeling well overall but poor PO intake since she was told to watch her fluid intake. Reports walking up her stairs with chest pain half way up which is her baseline. She was brushing her hair when she had a syncopal episode unsure how long she was unconscious for. No prodromal symptoms. Some confusion after but quickly back to her baseline. EMS strip with what appears to be afib with slow ventricular response. Workup showing ECG with afib and rate in 70s. On dilt 240 daily and dig 125 daily. In ED, HD stable. WBC mildly elevated. Lactic 2.6. Cr up to 1.2 from 0.7 with mild hyperkalemia.    · Normal left ventricular systolic function. The estimated ejection fraction is 60% (QEF 62%)  · Concentric left ventricular remodeling.  · Biatrial enlargement.  · No wall motion abnormalities.  · Normal right ventricular systolic function.  · Aortic valve area is 0.80 cm2; peak velocity is 4.1 m/s; mean gradient is 42 mmHg.  · Severe aortic valve stenosis.  · Mild tricuspid regurgitation.  · The estimated PA systolic pressure is 39 mm Hg  · Normal central venous pressure (3 mm Hg).  Atrial  fibrillation observed.    Past Medical History:   Diagnosis Date    Acute on chronic diastolic (congestive) heart failure 11/20/2019    Anticoagulant long-term use     on Plavix since May 2015    Anxiety     Arthritis     Asthma     Atrial fibrillation     Cataracts, bilateral     Chest pain, atypical     Colon polyp     Coronary artery disease     Diabetes mellitus     Dry eyes     Esophageal erosions     GERD (gastroesophageal reflux disease)     Heart murmur     Hemorrhoid     High cholesterol     Hypertension     Irritable bowel syndrome     Shingles 2015    TIA (transient ischemic attack)     Use of cane as ambulatory aid        Past Surgical History:   Procedure Laterality Date    ABDOMINAL SURGERY      angiocele      2007, 2014    AORTIC VALVE REPLACEMENT N/A 11/19/2019    Procedure: Replacement-valve-aortic;  Surgeon: Jack Capone MD;  Location: [facility] CATH LAB;  Service: Cardiology;  Laterality: N/A;    BREAST SURGERY      left--- a lump--- no cancer    COLONOSCOPY  2014    COLONOSCOPY N/A 3/14/2018    Procedure: COLONOSCOPY;  Surgeon: Efren Kemp MD;  Location: [facility] ENDO ([facility] FLR);  Service: Endoscopy;  Laterality: N/A;  ok to hold Plavix 5 days prior to procedure per Dr RESHMA Hernandez     ok per Dr Kemp to hold Eliquis 2 days prior to procedure (see telephone encounter dated-2/7/18)    HERNIA REPAIR      HYSTERECTOMY  partial    1982 partial hysterectomy    LEFT HEART CATHETERIZATION Right 11/5/2019    Procedure: Left heart cath;  Surgeon: Jack Capone MD;  Location: [facility] CATH LAB;  Service: Cardiology;  Laterality: Right;    left nasal polyp      left neck lymph node      nose polyp      right hip fatty mass tissue      stent to small intestine      SUPERIOR VENA CAVA ANGIOPLASTY / STENTING      TONSILLECTOMY      TOTAL ABDOMINAL HYSTERECTOMY      2014    TOTAL KNEE ARTHROPLASTY Bilateral     UPPER GASTROINTESTINAL ENDOSCOPY  2014       Review of patient's  allergies indicates:   Allergen Reactions    Celebrex [celecoxib] Shortness Of Breath    Ciprofloxacin Swelling     lip    Dicyclomine Hives    Adhesive Dermatitis    Avelox [moxifloxacin] Swelling    Cilostazol Other (See Comments)     Elevates blood pressure    Eryc [erythromycin] Other (See Comments)     unknown    Iodine and iodide containing products Hives    Keflex [cephalexin] Hives    Meclomen      rashes    Meloxicam      Ear ringing    Metoclopramide Other (See Comments)     High blood pressure    Sulfa (sulfonamide antibiotics) Itching       No current facility-administered medications on file prior to encounter.      Current Outpatient Medications on File Prior to Encounter   Medication Sig    acetaminophen (TYLENOL ARTHRITIS) 650 MG TbSR Take 1,300 mg by mouth 2 (two) times daily.    albuterol (ACCUNEB) 1.25 mg/3 mL Nebu Take scheduled q4 for the next two days while at home then resume prn dosing    albuterol (VENTOLIN HFA) 90 mcg/actuation inhaler Inhale 2 puffs into the lungs every 6 (six) hours as needed for Wheezing. Rescue    apixaban (ELIQUIS) 5 mg Tab Take 1 tablet (5 mg total) by mouth 2 (two) times daily.    ascorbic acid (VITAMIN C) 500 MG tablet Take 500 mg by mouth once daily.    azelastine (ASTELIN) 137 mcg nasal spray 1 spray by Nasal route 2 (two) times daily.    bisacodyl (DULCOLAX) 5 mg EC tablet Take 5 mg by mouth daily as needed for Constipation.    budesonide-formoterol 160-4.5 mcg (SYMBICORT) 160-4.5 mcg/actuation HFAA Inhale 2 puffs into the lungs every 12 (twelve) hours. Controller    calcium carbonate (OS-MICHAEL) 600 mg (1,500 mg) Tab Take 1,200 mg by mouth once daily.    cimetidine (TAGAMET) 200 MG tablet Take 300 mg by mouth.     clopidogrel (PLAVIX) 75 mg tablet Take 75 mg by mouth once daily.    cyanocobalamin (VITAMIN B-12) 1000 MCG tablet Take 100 mcg by mouth once daily.    digoxin (LANOXIN) 125 mcg tablet Take 1 tablet (125 mcg total) by mouth once  daily.    diltiaZEM HCl (CARDIZEM LA) 240 mg 24 hr tablet Take 1 tablet (240 mg total) by mouth once daily.    diphenhydrAMINE (BENADRYL) 25 mg capsule Take 50 mg by mouth.     DULoxetine (CYMBALTA) 20 MG capsule Take 1 capsule (20 mg total) by mouth 2 (two) times daily.    famotidine (PEPCID) 20 MG tablet Take 1 tablet (20 mg total) by mouth every 6 (six) hours. for 3 doses    fexofenadine (ALLEGRA) 60 MG tablet Take 60 mg by mouth every morning.    fish oil-omega-3 fatty acids 300-1,000 mg capsule Take 1 g by mouth once daily.     fluticasone (FLONASE) 50 mcg/actuation nasal spray 1 spray by Each Nare route every morning.     furosemide (LASIX) 40 MG tablet Take 1 tablet (40 mg total) by mouth once daily.    gabapentin (NEURONTIN) 300 MG capsule Take 2 capsules (600 mg total) by mouth 3 (three) times daily.    isosorbide mononitrate (IMDUR) 30 MG 24 hr tablet Take 1 tablet (30 mg total) by mouth once daily.    lisinopril (PRINIVIL,ZESTRIL) 20 MG tablet Take 1 tablet (20 mg total) by mouth 2 (two) times daily.    lorazepam (ATIVAN) 0.5 MG tablet Take 0.5 mg by mouth every morning.     magnesium 250 mg Tab Take 250 mg by mouth. 3 in am and 3 pm    metFORMIN (GLUCOPHAGE) 500 MG tablet Take 500 mg by mouth 2 (two) times daily.    nitroGLYCERIN (NITROSTAT) 0.4 MG SL tablet Place 1 tablet (0.4 mg total) under the tongue every 5 (five) minutes as needed for Chest pain.    nystatin (MYCOSTATIN) powder Apply topically 4 (four) times daily.    ondansetron (ZOFRAN-ODT) 4 MG TbDL DISSOLVE 1 TABLET ON TONGUE EVERY 8 HOURS AS NEEDED FOR NAUSEA    oxybutynin (DITROPAN-XL) 5 MG TR24 Take 5 mg by mouth every other day.     pantoprazole (PROTONIX) 40 MG tablet Take 1 tablet (40 mg total) by mouth once daily. (Patient taking differently: Take 40 mg by mouth every morning. )    POLYSORBATE 80/GLYCERIN (REFRESH DRY EYE THERAPY OPHT) Apply to eye 2 (two) times daily.    potassium gluconate 550 mg (90 mg) Tab Take  by mouth. 2 in am and 2 pm    predniSONE (DELTASONE) 50 MG Tab Take by mouth.     traMADol (ULTRAM) 50 mg tablet Take 1 tablet (50 mg total) by mouth every 6 (six) hours as needed for Pain.    zafirlukast (ACCOLATE) 20 MG tablet Take 20 mg by mouth once daily.     Family History     None        Tobacco Use    Smoking status: Never Smoker    Smokeless tobacco: Never Used   Substance and Sexual Activity    Alcohol use: No    Drug use: No    Sexual activity: Not on file     Review of Systems   Constitution: Negative for chills and fever.   Cardiovascular: Negative for chest pain, claudication, dyspnea on exertion, irregular heartbeat, leg swelling, near-syncope, orthopnea, palpitations, paroxysmal nocturnal dyspnea and syncope.   Respiratory: Negative for cough and shortness of breath.    Musculoskeletal: Negative for joint pain and joint swelling.   Gastrointestinal: Negative for abdominal pain, nausea and vomiting.   Neurological: Negative for focal weakness and headaches.   All other systems reviewed and are negative.    Objective:     Vital Signs (Most Recent):  Temp: 98.3 °F (36.8 °C) (11/26/19 1700)  Pulse: 77 (11/26/19 1700)  Resp: 16 (11/26/19 1700)  BP: 139/65 (11/26/19 1700)  SpO2: (!) 94 % (11/26/19 1700) Vital Signs (24h Range):  Temp:  [98.3 °F (36.8 °C)] 98.3 °F (36.8 °C)  Pulse:  [65-77] 77  Resp:  [15-18] 16  SpO2:  [94 %-100 %] 94 %  BP: (135-165)/(62-66) 139/65       Weight: 103.4 kg (228 lb)  Body mass index is 39.14 kg/m².    SpO2: (!) 94 %  O2 Device (Oxygen Therapy): room air    Physical Exam   Constitutional: She appears well-developed and well-nourished. No distress.   HENT:   Head: Normocephalic and atraumatic.   Eyes: EOM are normal. Right eye exhibits no discharge. Left eye exhibits no discharge. No scleral icterus.   Neck: Normal range of motion. Neck supple.   Cardiovascular: Normal rate, S1 normal, S2 normal, normal heart sounds, intact distal pulses and normal pulses. An irregular  rhythm present.  No extrasystoles are present. Exam reveals no gallop, no S3, no S4, no distant heart sounds, no friction rub and no opening snap.   No murmur heard.  Pulmonary/Chest: Effort normal. No respiratory distress. She has no wheezes. She has no rales.   Abdominal: Soft. Bowel sounds are normal. She exhibits no distension. There is no tenderness.   Musculoskeletal: Normal range of motion. She exhibits no edema or deformity.   Neurological: She is alert.   Skin: Skin is warm and dry. No rash noted. She is not diaphoretic. No erythema.   Nursing note and vitals reviewed.      Significant Labs: All pertinent lab results from the last 24 hours have been reviewed.    Significant Imaging: Reviewed              Assessment and Plan:     * Syncope  S/p TAVR 11/19 with Suzi. Uncertain etiology of syncope. No real prodromal symptoms. No evidence of seizure. Concerns for primary cardiogenic etiology. EMS strips appears to be afib with slow ventricular response. On arrival she was afib with normal rate. She does have a significant TACOS likely related to poor po intake. ?increased concentration of AVN blocking agents. More ominous rhythm also possible such as VT/VF. Dig level wnl.  - Holding dig  - Decrease diltiazem dose by half   - Will need a monitor at discharge  - Monitor on tele        Thank you for your consult. I will follow-up with patient. Please contact us if you have any additional questions.    Lane Dodson MD  Cardiac Electrophysiology  [facility]

## 2019-11-27 NOTE — NURSING
Patient discharged per MD order. Patient states understanding of discharge instructions. Peripheral IVs and telemetry removed. Home cardiac monitor placed on patient by . Patient is waiting for family transportation home. Will continue to monitor patient.

## 2019-11-27 NOTE — PROGRESS NOTES
11/26/19 2000   Vital Signs   Pulse 84   Heart Rate Source Monitor   BP (!) 153/69   MAP (mmHg) 99   BP Location Left arm   BP Method Automatic   Patient Position Lying     Post nitro administration. Pt stated her chest pain remains a 8/10. Dr. Dodson currently at bedside evaluating patient.

## 2019-11-27 NOTE — ASSESSMENT & PLAN NOTE
- cardiac arrhythmia vs hypotension episode  - discontinue digoxin  - decrease cardizem by half  - MCOT monitor for discharge, per EP. Follow up outpatient with EP.

## 2019-11-27 NOTE — PLAN OF CARE
CM met with patient and spouseAnthony at the bedside to discuss D/C POC needs. Patient AAO x's 3 and able to verify demographics in the chart are correct. CM name and contact number listed on the patient's white board.  CM provided explanation of discharge plan process. CM left blue folder at the bedside with explanation of qualification for placement and facility resources. Patient/family expressed understanding. CM remains available for any further patient needs or concerns.       Krzysztof Mcgraw MD  [address] [facility] [address] / [place] [address]      [id number] - [place], [place] - [address] [facility]  [address] [facility] [address]  Phone: [phone] Fax: [phone]      Extended Emergency Contact Information  Primary Emergency Contact: Anthony Strong  [address] [place] [address] [facility]  Home Phone: [phone]  Relation: Spouse  Secondary Emergency Contact: Tammy Kemp  Address: unknown   [place]  Mobile Phone: [phone]  Relation: Daughter    Future Appointments   Date Time Provider Department Center   12/18/2019  8:40 AM Hany Bee MD [facility] ARRHYTH [facility]   12/18/2019 11:00 AM ECHO, [facility] ECHOLAB [facility]   12/18/2019  1:40 PM LAB, APPOINTMENT [facility] LAB VNP [facility]   12/18/2019  3:00 PM INTERVENTIONAL, VALVE CLINIC [facility] CARDVAL [facility]   1/15/2020  9:15 AM Conner Eugene MD [facility] VASCSUR [facility]   2/11/2020  2:30 PM Jr Carrington MD [facility] CARDIO [facility]          11/27/19 1107   Discharge Assessment   Assessment Type Discharge Planning Assessment   Confirmed/corrected address and phone number on facesheet? Yes   Assessment information obtained from? Patient   Communicated expected length of stay with patient/caregiver yes   Prior to hospitilization cognitive status: Alert/Oriented   Prior to hospitalization functional status: Independent   Current  cognitive status: Alert/Oriented   Current Functional Status: Independent   Lives With spouse  (Anthony)   Able to Return to Prior Arrangements other (see comments)  (TBD)   Is patient able to care for self after discharge? Unable to determine at this time (comments)   Who are your caregiver(s) and their phone number(s)? Anthony Strong, spouse, [phone]; Tammy Kemp, daughter, [phone]   Patient currently being followed by outpatient case management? No   Patient currently receives any other outside agency services? No   Equipment Currently Used at Home cane, straight;walker, rolling;wheelchair   Do you have any problems affording any of your prescribed medications? No   Is the patient taking medications as prescribed? yes   Does the patient have transportation home? Yes   Transportation Anticipated family or friend will provide   Dialysis Name and Scheduled days n/a   Discharge Plan A Home   Discharge Plan B Home with family   DME Needed Upon Discharge  none   Patient/Family in Agreement with Plan yes       Yung Paige RN MSN  Critical Care-   Ext. [phone]

## 2019-11-27 NOTE — NURSING
Notified Dr. Dodson of pt status. Patient complains of chest pain, 8/10, on pain scale. Patient describes the pain as crushing pain that's non radiating. BP: 157/66 HR 77, 95% on RA. Md acknowledged findings. Orders given: STAT EKG, Sublingual Nitro 0.4 prn for chest pain, troponin check. Will place orders and continue to monitor.

## 2019-11-27 NOTE — SUBJECTIVE & OBJECTIVE
Interval History: No acute events overnight. Feeling well. Remains in afib with SVR.      Objective:     Vital Signs (Most Recent):  Temp: 98.7 °F (37.1 °C) (11/27/19 0814)  Pulse: 64 (11/27/19 1122)  Resp: 17 (11/27/19 0814)  BP: (!) 148/71 (11/27/19 0814)  SpO2: (!) 94 % (11/27/19 0814) Vital Signs (24h Range):  Temp:  [97.8 °F (36.6 °C)-98.7 °F (37.1 °C)] 98.7 °F (37.1 °C)  Pulse:  [64-85] 64  Resp:  [15-20] 17  SpO2:  [92 %-100 %] 94 %  BP: (135-165)/(62-71) 148/71     Weight: 103.4 kg (228 lb)  Body mass index is 39.14 kg/m².     SpO2: (!) 94 %  O2 Device (Oxygen Therapy): room air    Physical Exam  Vital signs reviewed  Constitutional: Oriented to person, place, and time. Appears  well-developed and well-nourished.   HENT:   Head: Normocephalic and atraumatic.   Eyes: EOM are normal.   Neck: Normal range of motion. No JVD present.   Cardiovascular: irregularly irregular, normal heart sounds No murmur heard.  Pulmonary/Chest: Effort normal and breath sounds normal. No wheeze or rales present. No chest wall tenderness  Abdominal: Soft.. There is no tenderness. There is no guarding.   Musculoskeletal: There is no edema present       Significant Labs: All pertinent lab results from the last 24 hours have been reviewed.    Significant Imaging: reviewed

## 2019-11-27 NOTE — PLAN OF CARE
Plan of care discussed with patient, no new questions at this time. VSS. Pt experienced chest pain during night shift, MD notified. Cardiac workup performed. EKG unremarkable, and troponin (-). MD stated this was non cardiac related. Pain resolved with prn analgesics. EP consulted for syncopal episode. Safety precautions taken, no falls/trauma during the shift. Will continue to monitor.

## 2019-11-27 NOTE — ASSESSMENT & PLAN NOTE
S/p TAVR 11/19 with Suzi. Uncertain etiology of syncope. No real prodromal symptoms. No evidence of seizure. Concerns for primary cardiogenic etiology. EMS strips appears to be afib with slow ventricular response. On arrival she was afib with normal rate. She does have a significant TACOS likely related to poor po intake. ?increased concentration of AVN blocking agents. More ominous rhythm also possible such as VT/VF. Dig level wnl.  - Holding dig  - Decrease diltiazem dose by half   - Will need a monitor at discharge  - Monitor on tele

## 2019-11-27 NOTE — DISCHARGE SUMMARY
[facility]  Interventional Cardiology  Discharge Summary      Patient Name: Adriana Strong  MRN: [id number]  Admission Date: 11/26/2019  Hospital Length of Stay: 0 days  Discharge Date and Time:  11/27/2019 12:55 PM  Attending Physician: Jack Capone MD  Discharging Provider: Danii Olivia PA-C  Primary Care Physician: Krzysztof Mcgraw MD    HPI:  74. y.o. female who presents for syncope.  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   She underwent TAVR-S3 via TF approach last week. Patient reports that today after walking up stairs to her bathroom-she passed out while fixing her hair. + LOC. Cannot determine if there was head trauma, but does admit to a headache currently. Patient states she lost consciousness for about 5 minutes.  When she awoke she denies any confusion. Denies prodromal symptoms of nausea and dizziness. She states SOB was gradually improving following TAVR last week. She admits to a 7-10 pound weight loss in the past week. She also admits to decreased fluid intake. She denies any history of syncope in the past. EMS was called by her  and EMS reported atrial fibrillation with slow HR on tele strips in 30s-40s. On arrival here she is atrial fibrillation In 50s-60s. EKG shows atrial fibrillation. She is normotensive. Orthostatics are negative. Currently denies CP and SOB.     Indwelling Lines/Drains at time of discharge:  Lines/Drains/Airways     None               Hospital Course:  Ms. Strong was admitted to [facility] on 11/26 following an episode of syncope. There were concerns for cardiac arrhythmia vs hypotension episode. EP was consulted. Her digoxin was held and her diltiazem was decreased by half. EMS strips showed afib with slow ventricular response. NOAC was held  until CT head was completed. She did complain of non-radiated LLSB chest pain worsened with pressure and breathing that was relieved with Tylenol. She remained stable overnight will be discharge with home MCOT (mobile telemetry).    Consults (From admission, onward)        Status Ordering Provider     Inpatient consult to Electrophysiology  Once     Provider:  (Not yet assigned)    Completed AXEL JUAN        Significant Diagnostic Studies: Labs:   BMP:   Recent Labs   Lab 11/26/19  1213 11/26/19  1617 11/27/19  0339   * 105 141*    138 137   K 5.6* 4.5 4.6   CL 99 101 102   CO2 25 28 27   BUN 23 22 20   CREATININE 1.2 0.9 0.8   CALCIUM 9.7 9.3 9.1   MG 1.8  --  1.7   , CMP   Recent Labs   Lab 11/26/19 1213 11/26/19  1617 11/27/19  0339    138 137   K 5.6* 4.5 4.6   CL 99 101 102   CO2 25 28 27   * 105 141*   BUN 23 22 20   CREATININE 1.2 0.9 0.8   CALCIUM 9.7 9.3 9.1   PROT 7.5  --  6.6   ALBUMIN 3.7  --  3.3*   BILITOT 0.7  --  0.4   ALKPHOS 47*  --  44*   AST 51*  --  32   ALT 32  --  26   ANIONGAP 12 9 8   ESTGFRAFRICA 50.7* >60.0 >60.0   EGFRNONAA 44.0* >60.0 >60.0   , CBC   Recent Labs   Lab 11/26/19  1213  11/27/19  0339   WBC 12.99*  --  8.65   HGB 13.5  --  12.4   HCT 41.9   < > 39.1     --  227    < > = values in this interval not displayed.   , INR   Lab Results   Component Value Date    INR 1.1 11/26/2019    INR 1.0 11/19/2019    INR 1.0 10/22/2019   , Lipid Panel   Lab Results   Component Value Date    CHOL 220 (H) 09/28/2016    HDL 75 09/28/2016    LDLCALC 124 09/28/2016    TRIG 104 09/28/2016    CHOLHDL 2.9 09/28/2016   , Troponin   Recent Labs   Lab 11/26/19 2025   TROPONINI 0.016   , A1C:   Recent Labs   Lab 11/20/19  0451   HGBA1C 6.5*     CT Head w/o contrast:  Impression       Chronic intracranial changes are noted there is no evidence for acute intracranial process.    Paranasal sinus disease most notably with respect to the sphenoid sinus correlation for  acute on chronic sinusitis is needed as discussed above.       * Syncope  - cardiac arrhythmia vs hypotension episode  - discontinue digoxin  - decrease cardizem by half  - MCOT monitor for discharge, per EP. Follow up outpatient with EP.         Atrial fibrillation, chronic  - history of chronic afib with low ventricular rate  - cardizem decreased by half  - digoxin discontinued for now  - continue Eliquis  - outpatient follow up with EP    S/P TAVR (transcatheter aortic valve replacement)  - S/p successful placement of a 26 S3 TAVR on 11/20/19  - follow up scheduled with [facility]ve [facility] next month.     Headache  - CT head negative  - resume home Eliquis    Essential hypertension  - continue home meds  - patient hypotensive on admission and fluids were given in ED    Controlled type 2 diabetes mellitus with diabetic polyneuropathy, without long-term current use of insulin  - resume home meds          Discharged Condition: good    Follow Up:    Patient Instructions:      Notify your health care provider if you experience any of the following:  temperature >100.4     Notify your health care provider if you experience any of the following:  persistent nausea and vomiting or diarrhea     Notify your health care provider if you experience any of the following:  severe uncontrolled pain     Notify your health care provider if you experience any of the following:  severe persistent headache     Notify your health care provider if you experience any of the following:  difficulty breathing or increased cough     Notify your health care provider if you experience any of the following:  persistent dizziness, light-headedness, or visual disturbances     Notify your health care provider if you experience any of the following:  increased confusion or weakness     Activity as tolerated     Medications:  Reconciled Home Medications:      Medication List      CHANGE how you take these medications    pantoprazole 40 MG tablet  Commonly  known as:  Protonix  Take 1 tablet (40 mg total) by mouth once daily.  What changed:  when to take this        CONTINUE taking these medications    * Ventolin HFA 90 mcg/actuation inhaler  Generic drug:  albuterol  Inhale 2 puffs into the lungs every 6 (six) hours as needed for Wheezing. Rescue     * albuterol 1.25 mg/3 mL Nebu  Commonly known as:  ACCUNEB  Take scheduled q4 for the next two days while at home then resume prn dosing     apixaban 5 mg Tab  Commonly known as:  ELIQUIS  Take 1 tablet (5 mg total) by mouth 2 (two) times daily.     azelastine 137 mcg (0.1 %) nasal spray  Commonly known as:  ASTELIN  1 spray by Nasal route 2 (two) times daily.     bisacodyl 5 mg EC tablet  Commonly known as:  DULCOLAX  Take 5 mg by mouth daily as needed for Constipation.     calcium carbonate 600 mg calcium (1,500 mg) Tab  Commonly known as:  OS-MICHAEL  Take 1,200 mg by mouth once daily.     cimetidine 200 MG tablet  Commonly known as:  TAGAMET  Take 300 mg by mouth.     clopidogrel 75 mg tablet  Commonly known as:  PLAVIX  Take 75 mg by mouth once daily.     diltiaZEM HCl 240 mg 24 hr tablet  Commonly known as:  CARDIZEM LA  Take 1 tablet (240 mg total) by mouth once daily.     diphenhydrAMINE 25 mg capsule  Commonly known as:  BENADRYL  Take 50 mg by mouth.     DULoxetine 20 MG capsule  Commonly known as:  CYMBALTA  Take 1 capsule (20 mg total) by mouth 2 (two) times daily.     famotidine 20 MG tablet  Commonly known as:  Pepcid  Take 1 tablet (20 mg total) by mouth every 6 (six) hours. for 3 doses     fexofenadine 60 MG tablet  Commonly known as:  ALLEGRA  Take 60 mg by mouth every morning.     fish oil-omega-3 fatty acids 300-1,000 mg capsule  Take 1 g by mouth once daily.     fluticasone propionate 50 mcg/actuation nasal spray  Commonly known as:  FLONASE  1 spray by Each Nare route every morning.     furosemide 40 MG tablet  Commonly known as:  LASIX  Take 1 tablet (40 mg total) by mouth once daily.     gabapentin 300  MG capsule  Commonly known as:  NEURONTIN  Take 2 capsules (600 mg total) by mouth 3 (three) times daily.     isosorbide mononitrate 30 MG 24 hr tablet  Commonly known as:  IMDUR  Take 1 tablet (30 mg total) by mouth once daily.     lisinopril 20 MG tablet  Commonly known as:  PRINIVIL,ZESTRIL  Take 1 tablet (20 mg total) by mouth 2 (two) times daily.     LORazepam 0.5 MG tablet  Commonly known as:  ATIVAN  Take 0.5 mg by mouth every morning.     magnesium 250 mg Tab  Take 250 mg by mouth. 3 in am and 3 pm     metFORMIN 500 MG tablet  Commonly known as:  GLUCOPHAGE  Take 500 mg by mouth 2 (two) times daily.     nitroGLYCERIN 0.4 MG SL tablet  Commonly known as:  NITROSTAT  Place 1 tablet (0.4 mg total) under the tongue every 5 (five) minutes as needed for Chest pain.     nystatin powder  Commonly known as:  MYCOSTATIN  Apply topically 4 (four) times daily.     ondansetron 4 MG Tbdl  Commonly known as:  ZOFRAN-ODT  DISSOLVE 1 TABLET ON TONGUE EVERY 8 HOURS AS NEEDED FOR NAUSEA     oxybutynin 5 MG Tr24  Commonly known as:  DITROPAN-XL  Take 5 mg by mouth every other day.     potassium gluconate 550 mg (90 mg) Tab  Take by mouth. 2 in am and 2 pm     predniSONE 50 MG Tab  Commonly known as:  DELTASONE  Take by mouth.     REFRESH DRY EYE THERAPY OPHT  Apply to eye 2 (two) times daily.     Symbicort 160-4.5 mcg/actuation Hfaa  Generic drug:  budesonide-formoterol 160-4.5 mcg  Inhale 2 puffs into the lungs every 12 (twelve) hours. Controller     traMADol 50 mg tablet  Commonly known as:  ULTRAM  Take 1 tablet (50 mg total) by mouth every 6 (six) hours as needed for Pain.     Tylenol Arthritis 650 MG Tbsr  Generic drug:  acetaminophen  Take 1,300 mg by mouth 2 (two) times daily.     Vitamin B-12 1000 MCG tablet  Generic drug:  cyanocobalamin  Take 100 mcg by mouth once daily.     Vitamin C 500 MG tablet  Generic drug:  ascorbic acid (vitamin C)  Take 500 mg by mouth once daily.     zafirlukast 20 MG tablet  Commonly known  as:  ACCOLATE  Take 20 mg by mouth once daily.         * This list has 2 medication(s) that are the same as other medications prescribed for you. Read the directions carefully, and ask your doctor or other care provider to review them with you.            STOP taking these medications    digoxin 125 mcg tablet  Commonly known as:  LANOXIN            Time spent on the discharge of patient: 40 minutes    Danii Olivia PA-C  Interventional Cardiology  [facility]

## 2019-11-27 NOTE — ASSESSMENT & PLAN NOTE
- history of chronic afib with low ventricular rate  - cardizem decreased by half  - digoxin discontinued for now  - continue Eliquis  - outpatient follow up with EP

## 2019-11-27 NOTE — ASSESSMENT & PLAN NOTE
S/p TAVR 11/19 with Suzi. Uncertain etiology of syncope. No real prodromal symptoms. No evidence of seizure. Concerns for primary cardiogenic etiology. EMS strips appears to be afib with slow ventricular response. On arrival she was afib with normal rate. She does have a significant TACOS likely related to poor po intake. ?increased concentration of AVN blocking agents. Dig level normal.    Recs:  - Discontinue digoxin for now   - Decrease diltiazem dose by half (120mg ER Qday)  - Set up with MCOT monitor and will follow up outpatient   - TACOS resolved, likely dehyration      Thank you for allowing us to care for this patient. We will sign off now. Please call with any questions or concerns.

## 2019-11-27 NOTE — PLAN OF CARE
Pt maintained free from falls/ trauma/ injuries and skin breakdown. Pt complaint of a H/A of 7/10, given PRN tramadol. Continue blood glucose monitoring, insulin coverage was not needed. Plan of care reviewed. Pt verbalized understanding. Fall precaution reinforced. All questions and concerns addressed. Will continue to monitor.

## 2019-11-27 NOTE — NURSING TRANSFER
Nursing Transfer Note      11/26/2019     Transfer From: ED    Transfer via stretcher    Transfer with cardiac monitoring    Transported by transportation    Medicines sent: no    Chart send with patient: yes    Notified: spouse / family    Patient reassessed at: 11/26 1650    Upon arrival to floor: cardiac monitor applied, patient oriented to room, call bell in reach and bed in lowest position

## 2019-11-27 NOTE — ASSESSMENT & PLAN NOTE
- S/p successful placement of a 26 S3 TAVR on 11/20/19  - follow up scheduled with [facility] next month.

## 2019-11-27 NOTE — PLAN OF CARE
Patient complaining of chest pain x 3 hours. Located and lower left sternum. Tender to palpation. Worse with movements and deep breaths. Denies radiation. Described as sharp. Denies associated nausea or SOB. ECG without dynamic changes.    MSK chest pain  - Tylenol 650 mg Q6 hours for pain (patient does not tolerate NSAIDs secondary to GI discomfort)    Discussed with Dr. Liborio Dodson MD  Cardiology  PGY-4  Pager: [phone]

## 2019-11-27 NOTE — PROGRESS NOTES
[facility]  Cardiac Electrophysiology  Progress Note    Admission Date: 11/26/2019  Code Status: Full Code   Attending Physician: Jack Capone MD   Expected Discharge Date:   Principal Problem:Syncope    Subjective:     Interval History: No acute events overnight. Feeling well. Remains in afib with SVR.      Objective:     Vital Signs (Most Recent):  Temp: 98.7 °F (37.1 °C) (11/27/19 0814)  Pulse: 64 (11/27/19 1122)  Resp: 17 (11/27/19 0814)  BP: (!) 148/71 (11/27/19 0814)  SpO2: (!) 94 % (11/27/19 0814) Vital Signs (24h Range):  Temp:  [97.8 °F (36.6 °C)-98.7 °F (37.1 °C)] 98.7 °F (37.1 °C)  Pulse:  [64-85] 64  Resp:  [15-20] 17  SpO2:  [92 %-100 %] 94 %  BP: (135-165)/(62-71) 148/71     Weight: 103.4 kg (228 lb)  Body mass index is 39.14 kg/m².     SpO2: (!) 94 %  O2 Device (Oxygen Therapy): room air    Physical Exam  Vital signs reviewed  Constitutional: Oriented to person, place, and time. Appears  well-developed and well-nourished.   HENT:   Head: Normocephalic and atraumatic.   Eyes: EOM are normal.   Neck: Normal range of motion. No JVD present.   Cardiovascular: irregularly irregular, normal heart sounds No murmur heard.  Pulmonary/Chest: Effort normal and breath sounds normal. No wheeze or rales present. No chest wall tenderness  Abdominal: Soft.. There is no tenderness. There is no guarding.   Musculoskeletal: There is no edema present       Significant Labs: All pertinent lab results from the last 24 hours have been reviewed.    Significant Imaging: reviewed     Assessment and Plan:     * Syncope  S/p TAVR 11/19 with Suzi. Uncertain etiology of syncope. No real prodromal symptoms. No evidence of seizure. Concerns for primary cardiogenic etiology. EMS strips appears to be afib with slow ventricular response. On arrival she was afib with normal rate. She does have a significant TACOS likely related to poor po intake. ?increased concentration of AVN blocking agents. Dig level  normal.    Recs:  - Discontinue digoxin for now   - Decrease diltiazem dose by half (120mg ER Qday)  - Set up with MCOT monitor and will follow up outpatient   - TACOS resolved, likely dehyration      Thank you for allowing us to care for this patient. We will sign off now. Please call with any questions or concerns.           Janie Burns MD  Cardiac Electrophysiology  [facility]

## 2019-11-27 NOTE — HOSPITAL COURSE
Ms. Strong was admitted to [facility] [place] on 11/26 following an episode of syncope. There were concerns for cardiac arrhythmia vs hypotension episode. EP was consulted. Her digoxin was held and her diltiazem was decreased by half. EMS strips showed afib with slow ventricular response. NOAC was held until CT head was completed. She did complain of non-radiated LLSB chest pain worsened with pressure and breathing that was relieved with Tylenol. She remained stable overnight will be discharge with home MCOT (mobile telemetry).

## 2019-11-27 NOTE — SUBJECTIVE & OBJECTIVE
Past Medical History:   Diagnosis Date    Acute on chronic diastolic (congestive) heart failure 11/20/2019    Anticoagulant long-term use     on Plavix since May 2015    Anxiety     Arthritis     Asthma     Atrial fibrillation     Cataracts, bilateral     Chest pain, atypical     Colon polyp     Coronary artery disease     Diabetes mellitus     Dry eyes     Esophageal erosions     GERD (gastroesophageal reflux disease)     Heart murmur     Hemorrhoid     High cholesterol     Hypertension     Irritable bowel syndrome     Shingles 2015    TIA (transient ischemic attack)     Use of cane as ambulatory aid        Past Surgical History:   Procedure Laterality Date    ABDOMINAL SURGERY      angiocele      2007, 2014    AORTIC VALVE REPLACEMENT N/A 11/19/2019    Procedure: Replacement-valve-aortic;  Surgeon: Jack Capone MD;  Location: [facility] CATH LAB;  Service: Cardiology;  Laterality: N/A;    BREAST SURGERY      left--- a lump--- no cancer    COLONOSCOPY  2014    COLONOSCOPY N/A 3/14/2018    Procedure: COLONOSCOPY;  Surgeon: Efren Kemp MD;  Location: [facility] ENDO ([address] [place] [address]);  Service: Endoscopy;  Laterality: N/A;  ok to hold Plavix 5 days prior to procedure per Dr RESHMA Hernandez     ok per Dr Kemp to hold Eliquis 2 days prior to procedure (see telephone encounter dated-2/7/18)    HERNIA REPAIR      HYSTERECTOMY  partial    1982 partial hysterectomy    LEFT HEART CATHETERIZATION Right 11/5/2019    Procedure: Left heart cath;  Surgeon: Jack Capone MD;  Location: [facility] CATH LAB;  Service: Cardiology;  Laterality: Right;    left nasal polyp      left neck lymph node      nose polyp      right hip fatty mass tissue      stent to small intestine      SUPERIOR VENA CAVA ANGIOPLASTY / STENTING      TONSILLECTOMY      TOTAL ABDOMINAL HYSTERECTOMY      2014    TOTAL KNEE ARTHROPLASTY Bilateral     UPPER GASTROINTESTINAL ENDOSCOPY  2014       Review of patient's allergies indicates:   Allergen  Reactions    Celebrex [celecoxib] Shortness Of Breath    Ciprofloxacin Swelling     lip    Dicyclomine Hives    Adhesive Dermatitis    Avelox [moxifloxacin] Swelling    Cilostazol Other (See Comments)     Elevates blood pressure    Eryc [erythromycin] Other (See Comments)     unknown    Iodine and iodide containing products Hives    Keflex [cephalexin] Hives    Meclomen      rashes    Meloxicam      Ear ringing    Metoclopramide Other (See Comments)     High blood pressure    Sulfa (sulfonamide antibiotics) Itching       No current facility-administered medications on file prior to encounter.      Current Outpatient Medications on File Prior to Encounter   Medication Sig    acetaminophen (TYLENOL ARTHRITIS) 650 MG TbSR Take 1,300 mg by mouth 2 (two) times daily.    albuterol (ACCUNEB) 1.25 mg/3 mL Nebu Take scheduled q4 for the next two days while at home then resume prn dosing    albuterol (VENTOLIN HFA) 90 mcg/actuation inhaler Inhale 2 puffs into the lungs every 6 (six) hours as needed for Wheezing. Rescue    apixaban (ELIQUIS) 5 mg Tab Take 1 tablet (5 mg total) by mouth 2 (two) times daily.    ascorbic acid (VITAMIN C) 500 MG tablet Take 500 mg by mouth once daily.    azelastine (ASTELIN) 137 mcg nasal spray 1 spray by Nasal route 2 (two) times daily.    bisacodyl (DULCOLAX) 5 mg EC tablet Take 5 mg by mouth daily as needed for Constipation.    budesonide-formoterol 160-4.5 mcg (SYMBICORT) 160-4.5 mcg/actuation HFAA Inhale 2 puffs into the lungs every 12 (twelve) hours. Controller    calcium carbonate (OS-MICHAEL) 600 mg (1,500 mg) Tab Take 1,200 mg by mouth once daily.    cimetidine (TAGAMET) 200 MG tablet Take 300 mg by mouth.     clopidogrel (PLAVIX) 75 mg tablet Take 75 mg by mouth once daily.    cyanocobalamin (VITAMIN B-12) 1000 MCG tablet Take 100 mcg by mouth once daily.    digoxin (LANOXIN) 125 mcg tablet Take 1 tablet (125 mcg total) by mouth once daily.    diltiaZEM HCl  (CARDIZEM LA) 240 mg 24 hr tablet Take 1 tablet (240 mg total) by mouth once daily.    diphenhydrAMINE (BENADRYL) 25 mg capsule Take 50 mg by mouth.     DULoxetine (CYMBALTA) 20 MG capsule Take 1 capsule (20 mg total) by mouth 2 (two) times daily.    famotidine (PEPCID) 20 MG tablet Take 1 tablet (20 mg total) by mouth every 6 (six) hours. for 3 doses    fexofenadine (ALLEGRA) 60 MG tablet Take 60 mg by mouth every morning.    fish oil-omega-3 fatty acids 300-1,000 mg capsule Take 1 g by mouth once daily.     fluticasone (FLONASE) 50 mcg/actuation nasal spray 1 spray by Each Nare route every morning.     furosemide (LASIX) 40 MG tablet Take 1 tablet (40 mg total) by mouth once daily.    gabapentin (NEURONTIN) 300 MG capsule Take 2 capsules (600 mg total) by mouth 3 (three) times daily.    isosorbide mononitrate (IMDUR) 30 MG 24 hr tablet Take 1 tablet (30 mg total) by mouth once daily.    lisinopril (PRINIVIL,ZESTRIL) 20 MG tablet Take 1 tablet (20 mg total) by mouth 2 (two) times daily.    lorazepam (ATIVAN) 0.5 MG tablet Take 0.5 mg by mouth every morning.     magnesium 250 mg Tab Take 250 mg by mouth. 3 in am and 3 pm    metFORMIN (GLUCOPHAGE) 500 MG tablet Take 500 mg by mouth 2 (two) times daily.    nitroGLYCERIN (NITROSTAT) 0.4 MG SL tablet Place 1 tablet (0.4 mg total) under the tongue every 5 (five) minutes as needed for Chest pain.    nystatin (MYCOSTATIN) powder Apply topically 4 (four) times daily.    ondansetron (ZOFRAN-ODT) 4 MG TbDL DISSOLVE 1 TABLET ON TONGUE EVERY 8 HOURS AS NEEDED FOR NAUSEA    oxybutynin (DITROPAN-XL) 5 MG TR24 Take 5 mg by mouth every other day.     pantoprazole (PROTONIX) 40 MG tablet Take 1 tablet (40 mg total) by mouth once daily. (Patient taking differently: Take 40 mg by mouth every morning. )    POLYSORBATE 80/GLYCERIN (REFRESH DRY EYE THERAPY OPHT) Apply to eye 2 (two) times daily.    potassium gluconate 550 mg (90 mg) Tab Take by mouth. 2 in am and 2 pm     predniSONE (DELTASONE) 50 MG Tab Take by mouth.     traMADol (ULTRAM) 50 mg tablet Take 1 tablet (50 mg total) by mouth every 6 (six) hours as needed for Pain.    zafirlukast (ACCOLATE) 20 MG tablet Take 20 mg by mouth once daily.     Family History     None        Tobacco Use    Smoking status: Never Smoker    Smokeless tobacco: Never Used   Substance and Sexual Activity    Alcohol use: No    Drug use: No    Sexual activity: Not on file     Review of Systems   Constitution: Negative for chills and fever.   Cardiovascular: Negative for chest pain, claudication, dyspnea on exertion, irregular heartbeat, leg swelling, near-syncope, orthopnea, palpitations, paroxysmal nocturnal dyspnea and syncope.   Respiratory: Negative for cough and shortness of breath.    Musculoskeletal: Negative for joint pain and joint swelling.   Gastrointestinal: Negative for abdominal pain, nausea and vomiting.   Neurological: Negative for focal weakness and headaches.   All other systems reviewed and are negative.    Objective:     Vital Signs (Most Recent):  Temp: 98.3 °F (36.8 °C) (11/26/19 1700)  Pulse: 77 (11/26/19 1700)  Resp: 16 (11/26/19 1700)  BP: 139/65 (11/26/19 1700)  SpO2: (!) 94 % (11/26/19 1700) Vital Signs (24h Range):  Temp:  [98.3 °F (36.8 °C)] 98.3 °F (36.8 °C)  Pulse:  [65-77] 77  Resp:  [15-18] 16  SpO2:  [94 %-100 %] 94 %  BP: (135-165)/(62-66) 139/65       Weight: 103.4 kg (228 lb)  Body mass index is 39.14 kg/m².    SpO2: (!) 94 %  O2 Device (Oxygen Therapy): room air    Physical Exam   Constitutional: She appears well-developed and well-nourished. No distress.   HENT:   Head: Normocephalic and atraumatic.   Eyes: EOM are normal. Right eye exhibits no discharge. Left eye exhibits no discharge. No scleral icterus.   Neck: Normal range of motion. Neck supple.   Cardiovascular: Normal rate, S1 normal, S2 normal, normal heart sounds, intact distal pulses and normal pulses. An irregular rhythm present.  No  extrasystoles are present. Exam reveals no gallop, no S3, no S4, no distant heart sounds, no friction rub and no opening snap.   No murmur heard.  Pulmonary/Chest: Effort normal. No respiratory distress. She has no wheezes. She has no rales.   Abdominal: Soft. Bowel sounds are normal. She exhibits no distension. There is no tenderness.   Musculoskeletal: Normal range of motion. She exhibits no edema or deformity.   Neurological: She is alert.   Skin: Skin is warm and dry. No rash noted. She is not diaphoretic. No erythema.   Nursing note and vitals reviewed.      Significant Labs: All pertinent lab results from the last 24 hours have been reviewed.    Significant Imaging: Reviewed

## 2019-12-11 DIAGNOSIS — K55.1 CHRONIC MESENTERIC ISCHEMIA: Primary | ICD-10-CM

## 2019-12-13 ENCOUNTER — TELEPHONE (OUTPATIENT)
Dept: CARDIOLOGY | Facility: CLINIC | Age: 74
End: 2019-12-13

## 2019-12-13 DIAGNOSIS — I49.8 OTHER SPECIFIED CARDIAC ARRHYTHMIAS: Primary | ICD-10-CM

## 2019-12-18 ENCOUNTER — OFFICE VISIT (OUTPATIENT)
Dept: ELECTROPHYSIOLOGY | Facility: CLINIC | Age: 74
End: 2019-12-18
Payer: MEDICARE

## 2019-12-18 ENCOUNTER — HOSPITAL ENCOUNTER (OUTPATIENT)
Dept: CARDIOLOGY | Facility: CLINIC | Age: 74
Discharge: HOME | End: 2019-12-18
Payer: MEDICARE

## 2019-12-18 ENCOUNTER — OFFICE VISIT (OUTPATIENT)
Dept: CARDIOLOGY | Facility: CLINIC | Age: 74
End: 2019-12-18
Payer: MEDICARE

## 2019-12-18 VITALS
HEIGHT: 64 IN | BODY MASS INDEX: 38.69 KG/M2 | WEIGHT: 226.63 LBS | OXYGEN SATURATION: 96 % | HEART RATE: 83 BPM | SYSTOLIC BLOOD PRESSURE: 167 MMHG | DIASTOLIC BLOOD PRESSURE: 77 MMHG

## 2019-12-18 VITALS
BODY MASS INDEX: 38.93 KG/M2 | HEART RATE: 89 BPM | SYSTOLIC BLOOD PRESSURE: 140 MMHG | DIASTOLIC BLOOD PRESSURE: 85 MMHG | WEIGHT: 228 LBS | HEIGHT: 64 IN

## 2019-12-18 VITALS — DIASTOLIC BLOOD PRESSURE: 85 MMHG | HEART RATE: 103 BPM | SYSTOLIC BLOOD PRESSURE: 140 MMHG

## 2019-12-18 DIAGNOSIS — E78.00 PURE HYPERCHOLESTEROLEMIA: ICD-10-CM

## 2019-12-18 DIAGNOSIS — Z95.2 S/P TAVR (TRANSCATHETER AORTIC VALVE REPLACEMENT): ICD-10-CM

## 2019-12-18 DIAGNOSIS — I48.20 ATRIAL FIBRILLATION, CHRONIC: ICD-10-CM

## 2019-12-18 DIAGNOSIS — I10 ESSENTIAL HYPERTENSION: Primary | ICD-10-CM

## 2019-12-18 DIAGNOSIS — I35.0 SEVERE AORTIC STENOSIS: ICD-10-CM

## 2019-12-18 DIAGNOSIS — Z79.01 WARFARIN ANTICOAGULATION: ICD-10-CM

## 2019-12-18 DIAGNOSIS — I48.19 PERSISTENT ATRIAL FIBRILLATION: Primary | ICD-10-CM

## 2019-12-18 DIAGNOSIS — I49.8 OTHER SPECIFIED CARDIAC ARRHYTHMIAS: ICD-10-CM

## 2019-12-18 DIAGNOSIS — K55.1 CHRONIC MESENTERIC ISCHEMIA: ICD-10-CM

## 2019-12-18 DIAGNOSIS — I50.33 ACUTE ON CHRONIC DIASTOLIC (CONGESTIVE) HEART FAILURE: ICD-10-CM

## 2019-12-18 DIAGNOSIS — R55 VASOVAGAL SYNCOPE: ICD-10-CM

## 2019-12-18 LAB
ASCENDING AORTA: 2.9 CM
AV INDEX (PROSTH): 0.77
AV MEAN GRADIENT: 10 MMHG
AV PEAK GRADIENT: 16 MMHG
AV VALVE AREA: 3.7 CM2
AV VELOCITY RATIO: 0.77
BSA FOR ECHO PROCEDURE: 2.16 M2
CV ECHO LV RWT: 0.48 CM
DOP CALC AO PEAK VEL: 1.97 M/S
DOP CALC AO VTI: 42.6 CM
DOP CALC LVOT AREA: 4.8 CM2
DOP CALC LVOT DIAMETER: 2.47 CM
DOP CALC LVOT PEAK VEL: 1.52 M/S
DOP CALC LVOT STROKE VOLUME: 157.52 CM3
DOP CALCLVOT PEAK VEL VTI: 32.89 CM
E/E' RATIO: 14.25 M/S
ECHO LV POSTERIOR WALL: 1.15 CM (ref 0.6–1.1)
FRACTIONAL SHORTENING: 34 % (ref 28–44)
INTERVENTRICULAR SEPTUM: 1.11 CM (ref 0.6–1.1)
LA MAJOR: 6.1 CM
LA MINOR: 6 CM
LA WIDTH: 4 CM
LEFT ATRIUM SIZE: 4.55 CM
LEFT ATRIUM VOLUME INDEX: 45.3 ML/M2
LEFT ATRIUM VOLUME: 93.59 CM3
LEFT INTERNAL DIMENSION IN SYSTOLE: 3.18 CM (ref 2.1–4)
LEFT VENTRICLE DIASTOLIC VOLUME INDEX: 53.13 ML/M2
LEFT VENTRICLE DIASTOLIC VOLUME: 109.87 ML
LEFT VENTRICLE MASS INDEX: 99 G/M2
LEFT VENTRICLE SYSTOLIC VOLUME INDEX: 19.6 ML/M2
LEFT VENTRICLE SYSTOLIC VOLUME: 40.47 ML
LEFT VENTRICULAR INTERNAL DIMENSION IN DIASTOLE: 4.84 CM (ref 3.5–6)
LEFT VENTRICULAR MASS: 204.05 G
LV LATERAL E/E' RATIO: 12.67 M/S
LV SEPTAL E/E' RATIO: 16.29 M/S
MV PEAK E VEL: 1.14 M/S
PISA TR MAX VEL: 3.07 M/S
PULM VEIN S/D RATIO: 0.48
PV PEAK D VEL: 0.81 M/S
PV PEAK S VEL: 0.39 M/S
RA MAJOR: 5.8 CM
RA PRESSURE: 3 MMHG
RA WIDTH: 3.9 CM
RIGHT VENTRICULAR END-DIASTOLIC DIMENSION: 3.76 CM
RV TISSUE DOPPLER FREE WALL SYSTOLIC VELOCITY 1 (APICAL 4 CHAMBER VIEW): 11.17 CM/S
SINUS: 2.76 CM
STJ: 2.69 CM
TDI LATERAL: 0.09 M/S
TDI SEPTAL: 0.07 M/S
TDI: 0.08 M/S
TR MAX PG: 38 MMHG
TRICUSPID ANNULAR PLANE SYSTOLIC EXCURSION: 2.02 CM
TV REST PULMONARY ARTERY PRESSURE: 41 MMHG

## 2019-12-18 PROCEDURE — 1126F AMNT PAIN NOTED NONE PRSNT: CPT | Mod: S$GLB,,, | Performed by: INTERNAL MEDICINE

## 2019-12-18 PROCEDURE — 1126F PR PAIN SEVERITY QUANTIFIED, NO PAIN PRESENT: ICD-10-PCS | Mod: S$GLB,,, | Performed by: INTERNAL MEDICINE

## 2019-12-18 PROCEDURE — 3077F SYST BP >= 140 MM HG: CPT | Mod: CPTII,S$GLB,, | Performed by: INTERNAL MEDICINE

## 2019-12-18 PROCEDURE — 3078F PR MOST RECENT DIASTOLIC BLOOD PRESSURE < 80 MM HG: ICD-10-PCS | Mod: CPTII,S$GLB,, | Performed by: INTERNAL MEDICINE

## 2019-12-18 PROCEDURE — 99999 PR PBB SHADOW E&M-EST. PATIENT-LVL IV: CPT | Mod: PBBFAC,,, | Performed by: INTERNAL MEDICINE

## 2019-12-18 PROCEDURE — 1101F PR PT FALLS ASSESS DOC 0-1 FALLS W/OUT INJ PAST YR: ICD-10-PCS | Mod: CPTII,S$GLB,, | Performed by: INTERNAL MEDICINE

## 2019-12-18 PROCEDURE — 3077F PR MOST RECENT SYSTOLIC BLOOD PRESSURE >= 140 MM HG: ICD-10-PCS | Mod: CPTII,S$GLB,, | Performed by: INTERNAL MEDICINE

## 2019-12-18 PROCEDURE — 1159F MED LIST DOCD IN RCRD: CPT | Mod: S$GLB,,, | Performed by: INTERNAL MEDICINE

## 2019-12-18 PROCEDURE — 99205 OFFICE O/P NEW HI 60 MIN: CPT | Mod: S$GLB,,, | Performed by: INTERNAL MEDICINE

## 2019-12-18 PROCEDURE — 99213 OFFICE O/P EST LOW 20 MIN: CPT | Mod: S$GLB,,, | Performed by: INTERNAL MEDICINE

## 2019-12-18 PROCEDURE — 93306 TTE W/DOPPLER COMPLETE: CPT | Mod: S$GLB,,, | Performed by: INTERNAL MEDICINE

## 2019-12-18 PROCEDURE — 93005 ELECTROCARDIOGRAM TRACING: CPT | Mod: S$GLB,,, | Performed by: INTERNAL MEDICINE

## 2019-12-18 PROCEDURE — 99213 PR OFFICE/OUTPT VISIT, EST, LEVL III, 20-29 MIN: ICD-10-PCS | Mod: S$GLB,,, | Performed by: INTERNAL MEDICINE

## 2019-12-18 PROCEDURE — 1159F PR MEDICATION LIST DOCUMENTED IN MEDICAL RECORD: ICD-10-PCS | Mod: S$GLB,,, | Performed by: INTERNAL MEDICINE

## 2019-12-18 PROCEDURE — 99999 PR PBB SHADOW E&M-EST. PATIENT-LVL V: CPT | Mod: PBBFAC,,,

## 2019-12-18 PROCEDURE — 99205 PR OFFICE/OUTPT VISIT, NEW, LEVL V, 60-74 MIN: ICD-10-PCS | Mod: S$GLB,,, | Performed by: INTERNAL MEDICINE

## 2019-12-18 PROCEDURE — 3079F PR MOST RECENT DIASTOLIC BLOOD PRESSURE 80-89 MM HG: ICD-10-PCS | Mod: CPTII,S$GLB,, | Performed by: INTERNAL MEDICINE

## 2019-12-18 PROCEDURE — 93010 ELECTROCARDIOGRAM REPORT: CPT | Mod: S$GLB,,, | Performed by: INTERNAL MEDICINE

## 2019-12-18 PROCEDURE — 99999 PR PBB SHADOW E&M-EST. PATIENT-LVL V: ICD-10-PCS | Mod: PBBFAC,,,

## 2019-12-18 PROCEDURE — 93010 RHYTHM STRIP: ICD-10-PCS | Mod: S$GLB,,, | Performed by: INTERNAL MEDICINE

## 2019-12-18 PROCEDURE — 1101F PT FALLS ASSESS-DOCD LE1/YR: CPT | Mod: CPTII,S$GLB,, | Performed by: INTERNAL MEDICINE

## 2019-12-18 PROCEDURE — 99999 PR PBB SHADOW E&M-EST. PATIENT-LVL IV: ICD-10-PCS | Mod: PBBFAC,,, | Performed by: INTERNAL MEDICINE

## 2019-12-18 PROCEDURE — 93306 ECHO (CUPID ONLY): ICD-10-PCS | Mod: S$GLB,,, | Performed by: INTERNAL MEDICINE

## 2019-12-18 PROCEDURE — 93005 RHYTHM STRIP: ICD-10-PCS | Mod: S$GLB,,, | Performed by: INTERNAL MEDICINE

## 2019-12-18 PROCEDURE — 3079F DIAST BP 80-89 MM HG: CPT | Mod: CPTII,S$GLB,, | Performed by: INTERNAL MEDICINE

## 2019-12-18 PROCEDURE — 3078F DIAST BP <80 MM HG: CPT | Mod: CPTII,S$GLB,, | Performed by: INTERNAL MEDICINE

## 2019-12-18 RX ORDER — DILTIAZEM HYDROCHLORIDE 180 MG/1
180 CAPSULE, COATED, EXTENDED RELEASE ORAL DAILY
Qty: 90 CAPSULE | Refills: 3 | Status: SHIPPED | OUTPATIENT
Start: 2019-12-18 | End: 2020-09-01

## 2019-12-18 RX ORDER — FLUCONAZOLE 150 MG/1
TABLET ORAL
Refills: 0 | Status: ON HOLD | COMMUNITY
Start: 2019-11-05 | End: 2020-10-21 | Stop reason: HOSPADM

## 2019-12-18 NOTE — PROGRESS NOTES
"Subjective:    Patient ID:  Adriana Strong is a 74 y.o. female who presents for evaluation of AF    HPI   74 y.o. F  severe AS, s/p TAVR 4/2019  syncope prior to TAVR  CVA/TIA  AF, without hx of DCCV ever  DM2 on meds  HFpEF on meds  HTN on meds    Had TAVR 11/2019. No change to QRS, and no heart block.  1 week later: Suffered vagal-sounding syncope (no injury, confusion/washed out for hours afterward, "dehydration" dx at ER).   Sent home with OT. This shows AF.    10/19 echo 60%. MISTY 0.8 cm2    My interpretation of today's ECG is AF at 103 bpm      Review of Systems   Constitution: Negative. Negative for malaise/fatigue.   HENT: Negative.  Negative for ear pain and tinnitus.    Eyes: Negative for blurred vision.   Cardiovascular: Negative.  Negative for chest pain, dyspnea on exertion, near-syncope, palpitations and syncope.   Respiratory: Negative.  Negative for shortness of breath.    Endocrine: Negative.  Negative for polyuria.   Hematologic/Lymphatic: Does not bruise/bleed easily.   Skin: Negative.  Negative for rash.   Musculoskeletal: Negative.  Negative for joint pain and muscle weakness.   Gastrointestinal: Negative.  Negative for abdominal pain and change in bowel habit.   Genitourinary: Negative for frequency.   Neurological: Negative.  Negative for dizziness and weakness.   Psychiatric/Behavioral: Negative.  Negative for depression. The patient is not nervous/anxious.    Allergic/Immunologic: Negative for environmental allergies.        Objective:    Physical Exam   Constitutional: She is oriented to person, place, and time. Vital signs are normal. She appears well-developed and well-nourished. She is active and cooperative.   HENT:   Head: Normocephalic and atraumatic.   Eyes: Conjunctivae and EOM are normal.   Neck: Normal range of motion. Carotid bruit is not present. No tracheal deviation and no edema present. No thyroid mass and no thyromegaly present.   Cardiovascular: Normal rate, intact " distal pulses and normal pulses. An irregularly irregular rhythm present.  No extrasystoles are present. PMI is not displaced. Exam reveals no gallop and no friction rub.   Murmur heard.   Systolic murmur is present with a grade of 2/6.  Pulmonary/Chest: Effort normal and breath sounds normal. No respiratory distress. She has no wheezes. She has no rales.   Abdominal: Soft. Normal appearance. She exhibits no distension. There is no hepatosplenomegaly.   Musculoskeletal: Normal range of motion.   Neurological: She is alert and oriented to person, place, and time. Coordination normal.   Skin: Skin is warm and dry. No rash noted.   Psychiatric: She has a normal mood and affect. Her speech is normal and behavior is normal. Thought content normal. Cognition and memory are normal.   Nursing note and vitals reviewed.        Assessment:       1. Essential hypertension    2. Pure hypercholesterolemia    3. Severe aortic stenosis    4. S/P TAVR (transcatheter aortic valve replacement)    5. Warfarin anticoagulation    6. BMI 38.0-38.9,adult    7. Vasovagal syncope    8. Other specified cardiac arrhythmias         Plan:       1. Syncope  Discussed with patient orthostatic/vasovagal mediated hypotension and the resultant decreased level of consciousness that can result. Discussed counteractive measures including standing up slowly, remaining hydrated, support hose, salt intake, exercise stressing lower extremity strength.  Med rx has been reduced.    2. AF  Discussed AF and its basic pathophysiology, including its health implications and treatment options (rate vs. rhythm control, meds vs. procedural/device treatment) as appropriate for the patient.  Continue OAC.  PEDRO/DCCV and 30-day monitor in near future. If feels better in NSR, will endeavor to maintain NSR -- which may be difficult, including requiring AAD (e.g., amio).    I discussed with patient risks, indications, benefits, and alternatives of the planned procedure. All  questions were answered. Patient understands and wishes to proceed.  Given syncope, instructed patient not to drive (or perform other activities that may be dangerous if loss of consciousness were to recur) for at least six months.

## 2019-12-18 NOTE — LETTER
December 18, 2019      Krzysztof Mcgraw MD  [address]  [address]  [place] [address]           [facility]  [address]  [place] [address]  Phone: [phone]  Fax: [phone]          Patient: Adriana Strong   MR Number: [id number]   YOB: 1945   Date of Visit: 12/18/2019       Dear Dr. Krzysztof Mcgraw:    Thank you for referring Adriana Strong to me for evaluation. Attached you will find relevant portions of my assessment and plan of care.    If you have questions, please do not hesitate to call me. I look forward to following Adriana Strong along with you.    Sincerely,    Hany Bee MD    Enclosure  CC:  No Recipients    If you would like to receive this communication electronically, please contact [email or web address] or [phone] to request more information on [facility] access.    For providers and/or their staff who would like to refer a patient to [facility], please contact us through our one-stop-shop provider referral line, [facility], at [phone].    If you feel you have received this communication in error or would no longer like to receive these types of communications, please e-mail [email or web address]

## 2019-12-18 NOTE — ASSESSMENT & PLAN NOTE
- S/p successful placement of 26 mm Suzi S3  - No PVL seen on echo today. MG 10 mmHg, PV 1.97 m/s  - PEDRO scheduled with Dr Bee, will need SBE prophylaxis prior to procedure  - Avoid non sterile procedures which could cause endocarditis for 6 months including dental work, endoscopy, colonoscopy, and  procedures.   - SBE prophylaxis for life   - Continue Eliquis and Plavix  - Follow up with [facility] in one year with labs and echo

## 2019-12-18 NOTE — ASSESSMENT & PLAN NOTE
- Patient does not wish to have Watchman Device at this time.  - Continue Eliquis  - Will need SBE prophylaxis for PEDRO/DCCV

## 2019-12-18 NOTE — PROGRESS NOTES
Subjective:    Patient ID:  Adriana Strong is a 74 y.o. female who presents for 1 month follow-up of TAVR.     HPI   Referring Physician: Dr. Carrington  NYHA: II CCS: 0  Ms. Strong is a 74 year old female with a past medical history of permanent-Afib on Eliquis, Severe AS, HLD, CVA/TIA without residual deficits, NIDDM type II with neuropathy, Mesenteric ischemic (LIZ stents, SMA  01/2016), GERD, DVT, HFpEF, and HTN who presents for a 1 month TAVR follow-up. She is s/p successful placement of a 26 S3 TAVR via TF access under MAC sedation on 11/20/19. She was admitted to [facility] on 11/26 following an episode of syncope. There were concerns for cardiac arrhythmia vs hypotension episode. EP was consulted and she was sent home with a 1 month cardiac event monitor scheduled to be taken off on January 3rd. She is currently on Eliquis and Plavix therapy due to a history of chronic Afib and LIZ stents. She is followed by Dr. Bee and Dr Hernandez. CHADS VASC 4 and HAS BLED score of 3. She was seen by Dr. Bee today and is scheduled for a PEDRO/DCCV on January 6th. Patient reports major relief in SOB. Prior to TAVR she was unable to walk up to communion at [employer] which she can now do without symptoms. She is able to walk further distances without SOB. She denies CP, peripheral edema, PND, and orthopnea.   Review of Systems   Constitution: Negative for chills and fever.   Eyes: Negative for blurred vision.   Cardiovascular: Positive for palpitations. Negative for chest pain, claudication, dyspnea on exertion, irregular heartbeat, leg swelling, orthopnea and syncope.   Respiratory: Negative for shortness of breath.    Skin: Negative for itching, rash and suspicious lesions.   Musculoskeletal: Negative for falls.   Gastrointestinal: Negative for abdominal pain.   Neurological: Negative for disturbances in coordination and dizziness.   Psychiatric/Behavioral: Negative for altered mental status.          Past Medical History:    Diagnosis Date    Acute on chronic diastolic (congestive) heart failure 11/20/2019    Anticoagulant long-term use     on Plavix since May 2015    Anxiety     Arthritis     Asthma     Atrial fibrillation     Cataracts, bilateral     Chest pain, atypical     Colon polyp     Coronary artery disease     Diabetes mellitus     Dry eyes     Esophageal erosions     GERD (gastroesophageal reflux disease)     Heart murmur     Hemorrhoid     High cholesterol     Hypertension     Irritable bowel syndrome     Shingles 2015    Stenosis of aortic and mitral valves     TIA (transient ischemic attack)     Use of cane as ambulatory aid      Current Outpatient Medications on File Prior to Visit   Medication Sig Dispense Refill    acetaminophen (TYLENOL ARTHRITIS) 650 MG TbSR Take 1,300 mg by mouth 2 (two) times daily.      albuterol (ACCUNEB) 1.25 mg/3 mL Nebu Take scheduled q4 for the next two days while at home then resume prn dosing 3 mL 1    albuterol (VENTOLIN HFA) 90 mcg/actuation inhaler Inhale 2 puffs into the lungs every 6 (six) hours as needed for Wheezing. Rescue      apixaban (ELIQUIS) 5 mg Tab Take 1 tablet (5 mg total) by mouth 2 (two) times daily. 180 tablet 3    ascorbic acid (VITAMIN C) 500 MG tablet Take 500 mg by mouth once daily.      azelastine (ASTELIN) 137 mcg nasal spray 1 spray by Nasal route 2 (two) times daily.      bisacodyl (DULCOLAX) 5 mg EC tablet Take 5 mg by mouth daily as needed for Constipation.      budesonide-formoterol 160-4.5 mcg (SYMBICORT) 160-4.5 mcg/actuation HFAA Inhale 2 puffs into the lungs every 12 (twelve) hours. Controller      calcium carbonate (OS-MICHAEL) 600 mg (1,500 mg) Tab Take 1,200 mg by mouth once daily.      clopidogrel (PLAVIX) 75 mg tablet Take 75 mg by mouth once daily.      cyanocobalamin (VITAMIN B-12) 1000 MCG tablet Take 100 mcg by mouth once daily.      diphenhydrAMINE (BENADRYL) 25 mg capsule Take 50 mg by mouth.       DULoxetine  (CYMBALTA) 20 MG capsule Take 1 capsule (20 mg total) by mouth 2 (two) times daily. 180 capsule 3    famotidine (PEPCID) 20 MG tablet Take 1 tablet (20 mg total) by mouth every 6 (six) hours. for 3 doses 3 tablet 0    fexofenadine (ALLEGRA) 60 MG tablet Take 60 mg by mouth every morning.      fish oil-omega-3 fatty acids 300-1,000 mg capsule Take 1 g by mouth once daily.       fluticasone (FLONASE) 50 mcg/actuation nasal spray 1 spray by Each Nare route every morning.       furosemide (LASIX) 40 MG tablet Take 1 tablet (40 mg total) by mouth once daily. 90 tablet 3    gabapentin (NEURONTIN) 300 MG capsule Take 2 capsules (600 mg total) by mouth 3 (three) times daily. 540 capsule 3    isosorbide mononitrate (IMDUR) 30 MG 24 hr tablet Take 1 tablet (30 mg total) by mouth once daily. 30 tablet 11    lisinopril (PRINIVIL,ZESTRIL) 20 MG tablet Take 1 tablet (20 mg total) by mouth 2 (two) times daily. 180 tablet 3    lorazepam (ATIVAN) 0.5 MG tablet Take 0.5 mg by mouth every morning.       magnesium 250 mg Tab Take 250 mg by mouth. 3 in am and 3 pm      metFORMIN (GLUCOPHAGE) 500 MG tablet Take 500 mg by mouth 2 (two) times daily.  1    nitroGLYCERIN (NITROSTAT) 0.4 MG SL tablet Place 1 tablet (0.4 mg total) under the tongue every 5 (five) minutes as needed for Chest pain. 30 tablet 11    nystatin (MYCOSTATIN) powder Apply topically 4 (four) times daily. 1 Bottle 0    ondansetron (ZOFRAN-ODT) 4 MG TbDL DISSOLVE 1 TABLET ON TONGUE EVERY 8 HOURS AS NEEDED FOR NAUSEA  1    oxybutynin (DITROPAN-XL) 5 MG TR24 Take 5 mg by mouth every other day.       pantoprazole (PROTONIX) 40 MG tablet Take 1 tablet (40 mg total) by mouth once daily. (Patient taking differently: Take 40 mg by mouth every morning. ) 90 tablet 3    POLYSORBATE 80/GLYCERIN (REFRESH DRY EYE THERAPY OPHT) Apply to eye 2 (two) times daily.      potassium gluconate 550 mg (90 mg) Tab Take by mouth. 2 in am and 2 pm      predniSONE (DELTASONE) 50  "MG Tab Take by mouth.       traMADol (ULTRAM) 50 mg tablet Take 1 tablet (50 mg total) by mouth every 6 (six) hours as needed for Pain. 20 tablet 0    zafirlukast (ACCOLATE) 20 MG tablet Take 20 mg by mouth once daily.  3    cimetidine (TAGAMET) 200 MG tablet Take 300 mg by mouth.       [DISCONTINUED] diltiaZEM HCl (CARDIZEM LA) 240 mg 24 hr tablet Take 1 tablet (240 mg total) by mouth once daily. (Patient taking differently: Take 120 mg by mouth once daily. ) 90 tablet 3     No current facility-administered medications on file prior to visit.      Vitals:    12/18/19 1313 12/18/19 1314   BP: (!) 170/87 (!) 167/77   BP Location: Right arm Left arm   Patient Position: Sitting Sitting   BP Method: Large (Automatic) Large (Automatic)   Pulse: 83 83   SpO2: 96%    Weight: 102.8 kg (226 lb 10.1 oz)    Height: 5' 4" (1.626 m)      Body mass index is 38.9 kg/m².  Objective:    Physical Exam   Constitutional: She is oriented to person, place, and time. She appears well-developed and well-nourished. No distress.   HENT:   Head: Normocephalic and atraumatic.   Eyes: EOM are normal.   Neck: Normal range of motion.   Cardiovascular: Intact distal pulses. An irregular rhythm present.   Murmur heard.   Harsh midsystolic murmur is present with a grade of 1/6 at the upper right sternal border radiating to the neck.  Pulmonary/Chest: Effort normal and breath sounds normal. No respiratory distress.   Neurological: She is alert and oriented to person, place, and time.   Skin: She is not diaphoretic.   Nursing note and vitals reviewed.        TTE Today  · Concentric left ventricular hypertrophy.  · Normal left ventricular systolic function. The estimated ejection fraction is 60%.  · Normal right ventricular systolic function.  · Left ventricular diastolic dysfunction.  · Biatrial enlargement.  · There is a 26 mm Phan Suzi S3 transcutaneously-placed aortic bioprosthesis present. There is no aortic insufficiency present. Mean " gradient = 10mm Hg.  · Small posterior pericardial effusion.  · The estimated PA systolic pressure is 41 mm Hg  · Normal central venous pressure (3 mm Hg).  Assessment & Plan:       S/P TAVR (transcatheter aortic valve replacement)  - S/p successful placement of 26 mm Suzi S3  - No PVL seen on echo today. MG 10 mmHg, PV 1.97 m/s  - PEDRO scheduled with Dr Bee, will need SBE prophylaxis prior to procedure  - Avoid non sterile procedures which could cause endocarditis for 6 months including dental work, endoscopy, colonoscopy, and  procedures.   - SBE prophylaxis for life   - Continue Eliquis and Plavix. No ASA therapy.  - Follow up with [facility]ve Clinic in one year with labs and echo  - NYHA class I, CCS Class 0     Atrial fibrillation, chronic. CHADS-VASC 4, HAS BLED 3  - Patient does not wish to have Watchman Device at this time.  - Continue Eliquis  - Will need SBE prophylaxis for PEDRO/DCCV    Acute on chronic diastolic (congestive) heart failure  - No signs of volume overload on exam  - Continue Lasix as prescribed    Chronic mesenteric ischemia  - S/P LIZ stents  - Continue to follow with Dr. Mary Olivia, PA-C  Interventional Cardiology  [facility]

## 2019-12-30 ENCOUNTER — TELEPHONE (OUTPATIENT)
Dept: ELECTROPHYSIOLOGY | Facility: CLINIC | Age: 74
End: 2019-12-30

## 2019-12-30 NOTE — TELEPHONE ENCOUNTER
----- Message from Anna Guerrero sent at 12/30/2019  8:15 AM CST -----  Contact: Tammy  Pls call pt's daughter Tammy at [phone].  Pt is scheduled for a procedure on 1/6/20 but was told by her other Cardio doctor that she shouldn't have any more procedures for at least 6 months and wants to be sure Dr. Bee is aware of this.    Thank you

## 2020-01-03 ENCOUNTER — TELEPHONE (OUTPATIENT)
Dept: ELECTROPHYSIOLOGY | Facility: CLINIC | Age: 75
End: 2020-01-03

## 2020-01-03 DIAGNOSIS — I48.19 PERSISTENT ATRIAL FIBRILLATION: Primary | ICD-10-CM

## 2020-01-13 ENCOUNTER — HOSPITAL ENCOUNTER (OUTPATIENT)
Dept: VASCULAR SURGERY | Facility: CLINIC | Age: 75
Discharge: HOME | End: 2020-01-13
Attending: SURGERY
Payer: MEDICARE

## 2020-01-13 ENCOUNTER — OFFICE VISIT (OUTPATIENT)
Dept: VASCULAR SURGERY | Facility: CLINIC | Age: 75
End: 2020-01-13
Attending: SURGERY
Payer: MEDICARE

## 2020-01-13 VITALS
HEIGHT: 64 IN | HEART RATE: 97 BPM | BODY MASS INDEX: 39.27 KG/M2 | SYSTOLIC BLOOD PRESSURE: 145 MMHG | TEMPERATURE: 97 F | DIASTOLIC BLOOD PRESSURE: 81 MMHG | WEIGHT: 230 LBS

## 2020-01-13 DIAGNOSIS — K55.1 CHRONIC MESENTERIC ISCHEMIA: ICD-10-CM

## 2020-01-13 DIAGNOSIS — K55.1 MESENTERIC ARTERY INSUFFICIENCY: Primary | ICD-10-CM

## 2020-01-13 PROCEDURE — 1101F PT FALLS ASSESS-DOCD LE1/YR: CPT | Mod: CPTII,S$GLB,, | Performed by: SURGERY

## 2020-01-13 PROCEDURE — 1159F PR MEDICATION LIST DOCUMENTED IN MEDICAL RECORD: ICD-10-PCS | Mod: S$GLB,,, | Performed by: SURGERY

## 2020-01-13 PROCEDURE — 3079F DIAST BP 80-89 MM HG: CPT | Mod: CPTII,S$GLB,, | Performed by: SURGERY

## 2020-01-13 PROCEDURE — 93975 VASCULAR STUDY: CPT | Mod: S$GLB,,, | Performed by: SURGERY

## 2020-01-13 PROCEDURE — 1159F MED LIST DOCD IN RCRD: CPT | Mod: S$GLB,,, | Performed by: SURGERY

## 2020-01-13 PROCEDURE — 1101F PR PT FALLS ASSESS DOC 0-1 FALLS W/OUT INJ PAST YR: ICD-10-PCS | Mod: CPTII,S$GLB,, | Performed by: SURGERY

## 2020-01-13 PROCEDURE — 3077F PR MOST RECENT SYSTOLIC BLOOD PRESSURE >= 140 MM HG: ICD-10-PCS | Mod: CPTII,S$GLB,, | Performed by: SURGERY

## 2020-01-13 PROCEDURE — 1126F AMNT PAIN NOTED NONE PRSNT: CPT | Mod: S$GLB,,, | Performed by: SURGERY

## 2020-01-13 PROCEDURE — 93975 PR DUPLEX ABD/PEL VASC STUDY,COMPLETE: ICD-10-PCS | Mod: S$GLB,,, | Performed by: SURGERY

## 2020-01-13 PROCEDURE — 99999 PR PBB SHADOW E&M-EST. PATIENT-LVL V: ICD-10-PCS | Mod: PBBFAC,,, | Performed by: SURGERY

## 2020-01-13 PROCEDURE — 3079F PR MOST RECENT DIASTOLIC BLOOD PRESSURE 80-89 MM HG: ICD-10-PCS | Mod: CPTII,S$GLB,, | Performed by: SURGERY

## 2020-01-13 PROCEDURE — 99213 OFFICE O/P EST LOW 20 MIN: CPT | Mod: S$GLB,,, | Performed by: SURGERY

## 2020-01-13 PROCEDURE — 3077F SYST BP >= 140 MM HG: CPT | Mod: CPTII,S$GLB,, | Performed by: SURGERY

## 2020-01-13 PROCEDURE — 99213 PR OFFICE/OUTPT VISIT, EST, LEVL III, 20-29 MIN: ICD-10-PCS | Mod: S$GLB,,, | Performed by: SURGERY

## 2020-01-13 PROCEDURE — 1126F PR PAIN SEVERITY QUANTIFIED, NO PAIN PRESENT: ICD-10-PCS | Mod: S$GLB,,, | Performed by: SURGERY

## 2020-01-13 PROCEDURE — 99999 PR PBB SHADOW E&M-EST. PATIENT-LVL V: CPT | Mod: PBBFAC,,, | Performed by: SURGERY

## 2020-01-13 RX ORDER — CLOPIDOGREL BISULFATE 75 MG/1
75 TABLET ORAL DAILY
Qty: 90 TABLET | Refills: 3 | Status: SHIPPED | OUTPATIENT
Start: 2020-01-13 | End: 2020-11-09 | Stop reason: SDUPTHER

## 2020-01-13 NOTE — PROGRESS NOTES
VASCULAR SURGERY NOTE    Patient ID: Adriana Strong is a 75 y.o. female.    I. HISTORY     Chief Complaint: follow up    HPI: Adriana Strong is a 75 y.o. female who is here today for established patient appointment. She has history of LIZ stenting in 2015 with Dr. Hernandez. 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 Today she returns for follow up ultrasound. She denies any pain in her abdomen. She has been eating well. She has not had nausea or vomiting. No blood in stool.    ASA: no  Plavix: yes  Anticoagulation: apixaban        Past Medical History:   Diagnosis Date    Acute on chronic diastolic (congestive) heart failure 11/20/2019    Anticoagulant long-term use     on Plavix since May 2015    Anxiety     Arthritis     Asthma     Atrial fibrillation     Cataracts, bilateral     Chest pain, atypical     Colon polyp     Coronary artery disease     Diabetes mellitus     Dry eyes     Esophageal erosions     GERD (gastroesophageal reflux disease)     Heart murmur     Hemorrhoid     High cholesterol     Hypertension     Irritable bowel syndrome     Shingles 2015    Stenosis of aortic and mitral valves     TIA (transient ischemic attack)     Use of cane as ambulatory aid         Past Surgical History:   Procedure Laterality Date    ABDOMINAL SURGERY      angiocele      2007, 2014    AORTIC VALVE REPLACEMENT N/A 11/19/2019    Procedure: Replacement-valve-aortic;  Surgeon: Jack Capone MD;  Location: [facility] CATH LAB;  Service: Cardiology;  Laterality: N/A;    BREAST SURGERY      left--- a lump--- no cancer    CARDIAC VALVE SURGERY      COLONOSCOPY  2014    COLONOSCOPY N/A 3/14/2018    Procedure: COLONOSCOPY;  Surgeon: Efren Kemp MD;  Location: [facility] ENDO ([address] [place] [address]);  Service: Endoscopy;  Laterality: N/A;  ok to hold Plavix 5 days prior to procedure per Dr RESHMA Hernandez     ok per Dr Kemp to hold Eliquis 2 days prior to procedure (see  telephone encounter dated-2/7/18)    HERNIA REPAIR      HYSTERECTOMY  partial    1982 partial hysterectomy    LEFT HEART CATHETERIZATION Right 11/5/2019    Procedure: Left heart cath;  Surgeon: Jack Capone MD;  Location: [facility] CATH LAB;  Service: Cardiology;  Laterality: Right;    left nasal polyp      left neck lymph node      nose polyp      right hip fatty mass tissue      stent to small intestine      SUPERIOR VENA CAVA ANGIOPLASTY / STENTING      TONSILLECTOMY      TOTAL ABDOMINAL HYSTERECTOMY      2014    TOTAL KNEE ARTHROPLASTY Bilateral     UPPER GASTROINTESTINAL ENDOSCOPY  2014       Social History     Tobacco Use   Smoking Status Never Smoker   Smokeless Tobacco Never Used        Review of Systems   Constitution: Negative for chills and fever.   HENT: Negative for ear pain and hoarse voice.    Eyes: Negative for discharge and pain.   Cardiovascular: Negative for chest pain and palpitations.   Respiratory: Negative for cough, hemoptysis and shortness of breath.    Endocrine: Negative for polydipsia and polyuria.   Skin: Negative for dry skin and rash.   Musculoskeletal: Negative for back pain and neck pain.   Gastrointestinal: Negative for abdominal pain, diarrhea, nausea and vomiting.   Genitourinary: Negative for dysuria and hematuria.   Neurological: Negative for dizziness and paresthesias.         II. PHYSICAL EXAM     Physical Exam  GEN: Alert/oriented  HEENT: atraumatic, normocephalic  CHEST: normal respiratory effort, symmetrical chest rise  CARD: RRR  EXT: Warm, no cyanosis/edema  VASC: 2+ radial pulses bilaterally      III. ASSESSMENT & PLAN (MEDICAL DECISION MAKING)     1. Mesenteric artery insufficiency        Imaging Results:  Mesenteric Duplex 1/13/20:  Elevated velocity in celiac artery with evidence of stenosis. Patent LIZ has chronically elevated proximal velocity which is stable from last exam. Suspect that this is is secondary to the stent and does not represent any  hemodynamically significant stenosis.      Assessment/Diagnosis and Plan:  75 y.o. female with h/o SMA occlusion and LIZ stenting. No evidence of recurrent stenosis. Recommend continuing plavix indefinitely. She can continue her home apixaban as well. F/u 1 yr    -12 month Rx for plavix refilled for mesenteric stent patency  -Recommend starting high intensity statin (atorvastatin 40mg or rosuvastatin 20mg) as patient has multiple atherosclerotic risk factors and h/o mesenteric vascular disease. Will defer to PCP for initiation  -Follow up in 1 year for repeat mesenteric duplex

## 2020-01-14 DIAGNOSIS — I10 ESSENTIAL HYPERTENSION: ICD-10-CM

## 2020-01-14 RX ORDER — FUROSEMIDE 40 MG/1
TABLET ORAL
Qty: 90 TABLET | Refills: 3 | Status: SHIPPED | OUTPATIENT
Start: 2020-01-14 | End: 2020-11-10 | Stop reason: SDUPTHER

## 2020-01-16 NOTE — TELEPHONE ENCOUNTER
Pt has been scheduled for 2/10/20. What dosage for Amoxicillin and Vancomycin IV do you advise for pre-op? Thank you

## 2020-01-20 ENCOUNTER — TELEPHONE (OUTPATIENT)
Dept: ELECTROPHYSIOLOGY | Facility: CLINIC | Age: 75
End: 2020-01-20

## 2020-01-20 NOTE — TELEPHONE ENCOUNTER
Spoke with Martha, Dr. Capone's RN, regarding pt's need for antibiotics. She said that per Dr. Capone, he does not feel the pt will need pre-op antibiotics.

## 2020-02-03 ENCOUNTER — LAB VISIT (OUTPATIENT)
Dept: LAB | Facility: HOSPITAL | Age: 75
End: 2020-02-03
Attending: INTERNAL MEDICINE
Payer: MEDICARE

## 2020-02-03 DIAGNOSIS — I48.19 PERSISTENT ATRIAL FIBRILLATION: ICD-10-CM

## 2020-02-03 LAB
ANION GAP SERPL CALC-SCNC: 6 MMOL/L (ref 8–16)
APTT BLDCRRT: 43.1 SEC (ref 21–32)
BASOPHILS # BLD AUTO: 0.08 K/UL (ref 0–0.2)
BASOPHILS NFR BLD: 1.1 % (ref 0–1.9)
BUN SERPL-MCNC: 20 MG/DL (ref 7–17)
CALCIUM SERPL-MCNC: 9.2 MG/DL (ref 8.7–10.5)
CHLORIDE SERPL-SCNC: 101 MMOL/L (ref 95–110)
CO2 SERPL-SCNC: 31 MMOL/L (ref 23–29)
CREAT SERPL-MCNC: 0.71 MG/DL (ref 0.5–1.4)
DIFFERENTIAL METHOD: ABNORMAL
EOSINOPHIL # BLD AUTO: 0 K/UL (ref 0–0.5)
EOSINOPHIL NFR BLD: 0.6 % (ref 0–8)
ERYTHROCYTE [DISTWIDTH] IN BLOOD BY AUTOMATED COUNT: 13.8 % (ref 11.5–14.5)
EST. GFR  (AFRICAN AMERICAN): >60 ML/MIN/1.73 M^2
EST. GFR  (NON AFRICAN AMERICAN): >60 ML/MIN/1.73 M^2
GLUCOSE SERPL-MCNC: 105 MG/DL (ref 70–110)
HCT VFR BLD AUTO: 41.4 % (ref 37–48.5)
HGB BLD-MCNC: 13.4 G/DL (ref 12–16)
IMM GRANULOCYTES # BLD AUTO: 0.02 K/UL (ref 0–0.04)
IMM GRANULOCYTES NFR BLD AUTO: 0.3 % (ref 0–0.5)
INR PPP: 1.3 (ref 0.8–1.2)
LYMPHOCYTES # BLD AUTO: 2.8 K/UL (ref 1–4.8)
LYMPHOCYTES NFR BLD: 39.1 % (ref 18–48)
MCH RBC QN AUTO: 31.4 PG (ref 27–31)
MCHC RBC AUTO-ENTMCNC: 32.4 G/DL (ref 32–36)
MCV RBC AUTO: 97 FL (ref 82–98)
MONOCYTES # BLD AUTO: 0.7 K/UL (ref 0.3–1)
MONOCYTES NFR BLD: 10 % (ref 4–15)
NEUTROPHILS # BLD AUTO: 3.5 K/UL (ref 1.8–7.7)
NEUTROPHILS NFR BLD: 48.9 % (ref 38–73)
NRBC BLD-RTO: 0 /100 WBC
PLATELET # BLD AUTO: 251 K/UL (ref 150–350)
PMV BLD AUTO: 10.6 FL (ref 9.2–12.9)
POTASSIUM SERPL-SCNC: 4.9 MMOL/L (ref 3.5–5.1)
PROTHROMBIN TIME: 14.4 SEC (ref 9–12.5)
RBC # BLD AUTO: 4.27 M/UL (ref 4–5.4)
SODIUM SERPL-SCNC: 138 MMOL/L (ref 136–145)
WBC # BLD AUTO: 7.19 K/UL (ref 3.9–12.7)

## 2020-02-03 PROCEDURE — 85610 PROTHROMBIN TIME: CPT | Mod: PO

## 2020-02-03 PROCEDURE — 85730 THROMBOPLASTIN TIME PARTIAL: CPT | Mod: PO

## 2020-02-03 PROCEDURE — 85025 COMPLETE CBC W/AUTO DIFF WBC: CPT | Mod: PO

## 2020-02-03 PROCEDURE — 80048 BASIC METABOLIC PNL TOTAL CA: CPT | Mod: PO

## 2020-02-03 PROCEDURE — 36415 COLL VENOUS BLD VENIPUNCTURE: CPT | Mod: PO

## 2020-02-05 ENCOUNTER — TELEPHONE (OUTPATIENT)
Dept: ELECTROPHYSIOLOGY | Facility: CLINIC | Age: 75
End: 2020-02-05

## 2020-02-05 NOTE — TELEPHONE ENCOUNTER
Spoke to Adriana Strong    CONFIRMED procedure arrival time of 9am on 2/10  Reiterated instructions including:  -Directions to check in desk  -NPO after midnight night prior to procedure  -High importance of HOLDING Metformin on the day of the procedure   -Confirmed compliance of Eliquis  -Pre-procedure LABS 2/3- Labs received  --Do not wear mascara day of procedure       Pt verbalizes understanding of above and appreciates call.

## 2020-02-10 ENCOUNTER — ANESTHESIA (OUTPATIENT)
Dept: MEDSURG UNIT | Facility: HOSPITAL | Age: 75
End: 2020-02-10
Payer: MEDICARE

## 2020-02-10 ENCOUNTER — HOSPITAL ENCOUNTER (OUTPATIENT)
Facility: HOSPITAL | Age: 75
Discharge: HOME | End: 2020-02-10
Attending: INTERNAL MEDICINE | Admitting: INTERNAL MEDICINE
Payer: MEDICARE

## 2020-02-10 ENCOUNTER — CLINICAL SUPPORT (OUTPATIENT)
Dept: CARDIOLOGY | Facility: HOSPITAL | Age: 75
End: 2020-02-10
Attending: INTERNAL MEDICINE
Payer: MEDICARE

## 2020-02-10 ENCOUNTER — HOSPITAL ENCOUNTER (OUTPATIENT)
Dept: CARDIOLOGY | Facility: CLINIC | Age: 75
Discharge: HOME | End: 2020-02-10
Attending: INTERNAL MEDICINE | Admitting: INTERNAL MEDICINE
Payer: MEDICARE

## 2020-02-10 ENCOUNTER — ANESTHESIA EVENT (OUTPATIENT)
Dept: MEDSURG UNIT | Facility: HOSPITAL | Age: 75
End: 2020-02-10
Payer: MEDICARE

## 2020-02-10 VITALS
HEART RATE: 99 BPM | DIASTOLIC BLOOD PRESSURE: 68 MMHG | HEIGHT: 64 IN | OXYGEN SATURATION: 97 % | SYSTOLIC BLOOD PRESSURE: 137 MMHG | TEMPERATURE: 96 F | WEIGHT: 228 LBS | RESPIRATION RATE: 16 BRPM | BODY MASS INDEX: 38.93 KG/M2

## 2020-02-10 VITALS
BODY MASS INDEX: 38.93 KG/M2 | DIASTOLIC BLOOD PRESSURE: 80 MMHG | HEIGHT: 64 IN | WEIGHT: 228 LBS | SYSTOLIC BLOOD PRESSURE: 121 MMHG

## 2020-02-10 DIAGNOSIS — I48.19 PERSISTENT ATRIAL FIBRILLATION: ICD-10-CM

## 2020-02-10 DIAGNOSIS — Z92.89 HISTORY OF CARDIOVERSION: ICD-10-CM

## 2020-02-10 DIAGNOSIS — I48.91 ATRIAL FIBRILLATION: Primary | ICD-10-CM

## 2020-02-10 LAB
ASCENDING AORTA: 2.8 CM
BSA FOR ECHO PROCEDURE: 2.16 M2
POCT GLUCOSE: 103 MG/DL (ref 70–110)
POCT GLUCOSE: 110 MG/DL (ref 70–110)
PROX AORTA: 3 CM
SINUS: 2.3 CM
STJ: 2.5 CM

## 2020-02-10 PROCEDURE — 63600175 PHARM REV CODE 636 W HCPCS: Performed by: NURSE ANESTHETIST, CERTIFIED REGISTERED

## 2020-02-10 PROCEDURE — 25000003 PHARM REV CODE 250: Performed by: NURSE ANESTHETIST, CERTIFIED REGISTERED

## 2020-02-10 PROCEDURE — 93325 DOPPLER ECHO COLOR FLOW MAPG: CPT

## 2020-02-10 PROCEDURE — 92960 PR CARDIOVERSION, ELECTIVE;EXTERN: ICD-10-PCS | Mod: ,,, | Performed by: INTERNAL MEDICINE

## 2020-02-10 PROCEDURE — 82962 GLUCOSE BLOOD TEST: CPT

## 2020-02-10 PROCEDURE — 37000009 HC ANESTHESIA EA ADD 15 MINS: Performed by: INTERNAL MEDICINE

## 2020-02-10 PROCEDURE — 93005 ELECTROCARDIOGRAM TRACING: CPT | Performed by: INTERNAL MEDICINE

## 2020-02-10 PROCEDURE — D9220A PRA ANESTHESIA: ICD-10-PCS | Mod: CRNA,,, | Performed by: NURSE ANESTHETIST, CERTIFIED REGISTERED

## 2020-02-10 PROCEDURE — 93005 ELECTROCARDIOGRAM TRACING: CPT

## 2020-02-10 PROCEDURE — 93010 ELECTROCARDIOGRAM REPORT: CPT | Mod: ,,, | Performed by: INTERNAL MEDICINE

## 2020-02-10 PROCEDURE — 37000008 HC ANESTHESIA 1ST 15 MINUTES: Performed by: INTERNAL MEDICINE

## 2020-02-10 PROCEDURE — 93005 ELECTROCARDIOGRAM TRACING: CPT | Mod: 59

## 2020-02-10 PROCEDURE — 93312 TRANSESOPHAGEAL ECHO (TEE) (CUPID ONLY): ICD-10-PCS | Mod: 26,,, | Performed by: INTERNAL MEDICINE

## 2020-02-10 PROCEDURE — 93272 CARDIAC EVENT MONITOR (CUPID ONLY): ICD-10-PCS | Mod: ,,, | Performed by: INTERNAL MEDICINE

## 2020-02-10 PROCEDURE — 93325 TRANSESOPHAGEAL ECHO (TEE) (CUPID ONLY): ICD-10-PCS | Mod: 26,,, | Performed by: INTERNAL MEDICINE

## 2020-02-10 PROCEDURE — 92960 CARDIOVERSION ELECTRIC EXT: CPT | Performed by: INTERNAL MEDICINE

## 2020-02-10 PROCEDURE — D9220A PRA ANESTHESIA: ICD-10-PCS | Mod: ANES,,, | Performed by: ANESTHESIOLOGY

## 2020-02-10 PROCEDURE — 93320 TRANSESOPHAGEAL ECHO (TEE) (CUPID ONLY): ICD-10-PCS | Mod: 26,,, | Performed by: INTERNAL MEDICINE

## 2020-02-10 PROCEDURE — 93010 ELECTROCARDIOGRAM REPORT: CPT | Mod: 76,,, | Performed by: INTERNAL MEDICINE

## 2020-02-10 PROCEDURE — 93271 ECG/MONITORING AND ANALYSIS: CPT

## 2020-02-10 PROCEDURE — 93005 ELECTROCARDIOGRAM TRACING: CPT | Mod: 59 | Performed by: INTERNAL MEDICINE

## 2020-02-10 PROCEDURE — 63600175 PHARM REV CODE 636 W HCPCS: Performed by: NURSE PRACTITIONER

## 2020-02-10 PROCEDURE — 93312 ECHO TRANSESOPHAGEAL: CPT | Mod: 26,,, | Performed by: INTERNAL MEDICINE

## 2020-02-10 PROCEDURE — 82962 GLUCOSE BLOOD TEST: CPT | Performed by: INTERNAL MEDICINE

## 2020-02-10 PROCEDURE — 93010 EKG 12-LEAD: ICD-10-PCS | Mod: 76,,, | Performed by: INTERNAL MEDICINE

## 2020-02-10 PROCEDURE — D9220A PRA ANESTHESIA: Mod: ANES,,, | Performed by: ANESTHESIOLOGY

## 2020-02-10 PROCEDURE — 92960 CARDIOVERSION ELECTRIC EXT: CPT | Mod: ,,, | Performed by: INTERNAL MEDICINE

## 2020-02-10 PROCEDURE — 93272 ECG/REVIEW INTERPRET ONLY: CPT | Mod: ,,, | Performed by: INTERNAL MEDICINE

## 2020-02-10 PROCEDURE — 93320 DOPPLER ECHO COMPLETE: CPT | Mod: 26,,, | Performed by: INTERNAL MEDICINE

## 2020-02-10 PROCEDURE — D9220A PRA ANESTHESIA: Mod: CRNA,,, | Performed by: NURSE ANESTHETIST, CERTIFIED REGISTERED

## 2020-02-10 PROCEDURE — 93325 DOPPLER ECHO COLOR FLOW MAPG: CPT | Mod: 26,,, | Performed by: INTERNAL MEDICINE

## 2020-02-10 RX ORDER — LIDOCAINE HYDROCHLORIDE 20 MG/ML
INJECTION INTRAVENOUS
Status: DISCONTINUED | OUTPATIENT
Start: 2020-02-10 | End: 2020-02-10

## 2020-02-10 RX ORDER — LIDOCAINE HYDROCHLORIDE 20 MG/ML
SOLUTION OROPHARYNGEAL
Status: DISCONTINUED | OUTPATIENT
Start: 2020-02-10 | End: 2020-02-10

## 2020-02-10 RX ORDER — GLYCOPYRROLATE 0.2 MG/ML
INJECTION INTRAMUSCULAR; INTRAVENOUS
Status: DISCONTINUED | OUTPATIENT
Start: 2020-02-10 | End: 2020-02-10

## 2020-02-10 RX ORDER — KETAMINE HCL IN 0.9 % NACL 50 MG/5 ML
SYRINGE (ML) INTRAVENOUS
Status: DISCONTINUED | OUTPATIENT
Start: 2020-02-10 | End: 2020-02-10

## 2020-02-10 RX ORDER — ONDANSETRON 2 MG/ML
4 INJECTION INTRAMUSCULAR; INTRAVENOUS ONCE AS NEEDED
Status: DISCONTINUED | OUTPATIENT
Start: 2020-02-10 | End: 2020-02-10 | Stop reason: HOSPADM

## 2020-02-10 RX ORDER — SODIUM CHLORIDE 9 MG/ML
INJECTION, SOLUTION INTRAVENOUS CONTINUOUS
Status: ACTIVE | OUTPATIENT
Start: 2020-02-10

## 2020-02-10 RX ORDER — SODIUM CHLORIDE 0.9 % (FLUSH) 0.9 %
5 SYRINGE (ML) INJECTION
Status: ACTIVE | OUTPATIENT
Start: 2020-02-10

## 2020-02-10 RX ORDER — PROPOFOL 10 MG/ML
VIAL (ML) INTRAVENOUS
Status: DISCONTINUED | OUTPATIENT
Start: 2020-02-10 | End: 2020-02-10

## 2020-02-10 RX ORDER — SODIUM CHLORIDE 0.9 % (FLUSH) 0.9 %
2 SYRINGE (ML) INJECTION
Status: DISCONTINUED | OUTPATIENT
Start: 2020-02-10 | End: 2020-02-10 | Stop reason: HOSPADM

## 2020-02-10 RX ORDER — FENTANYL CITRATE 50 UG/ML
25 INJECTION, SOLUTION INTRAMUSCULAR; INTRAVENOUS EVERY 5 MIN PRN
Status: DISCONTINUED | OUTPATIENT
Start: 2020-02-10 | End: 2020-02-10 | Stop reason: HOSPADM

## 2020-02-10 RX ORDER — PROPOFOL 10 MG/ML
VIAL (ML) INTRAVENOUS CONTINUOUS PRN
Status: DISCONTINUED | OUTPATIENT
Start: 2020-02-10 | End: 2020-02-10

## 2020-02-10 RX ADMIN — LIDOCAINE HYDROCHLORIDE 100 MG: 20 INJECTION, SOLUTION INTRAVENOUS at 10:02

## 2020-02-10 RX ADMIN — SODIUM CHLORIDE: 0.9 INJECTION, SOLUTION INTRAVENOUS at 10:02

## 2020-02-10 RX ADMIN — PROPOFOL 150 MCG/KG/MIN: 10 INJECTION, EMULSION INTRAVENOUS at 10:02

## 2020-02-10 RX ADMIN — LIDOCAINE HYDROCHLORIDE 15 ML: 20 SOLUTION OROPHARYNGEAL at 10:02

## 2020-02-10 RX ADMIN — PROPOFOL 60 MG: 10 INJECTION, EMULSION INTRAVENOUS at 10:02

## 2020-02-10 RX ADMIN — GLYCOPYRROLATE 0.2 MG: 0.2 INJECTION, SOLUTION INTRAMUSCULAR; INTRAVENOUS at 10:02

## 2020-02-10 RX ADMIN — Medication 25 MG: at 10:02

## 2020-02-10 NOTE — PLAN OF CARE
Received report from Larissa DUBOIS. Patient s/p DCCV, AAOx3. VSS, no c/o pain or discomfort at this time, resp even and unlabored. Post procedure protocol reviewed with patient. Understanding verbalized. Family members called to bedside. Nurse call bell within reach. Will continue to monitor per post procedure protocol.

## 2020-02-10 NOTE — H&P
[facility] Cardiac [facility]  Cardiology  History and Physical     Patient Name: Adriana Strong  MRN: [id number]  Admission Date: 2/10/2020  Code Status: Prior   Attending Provider: Hany Bee MD   Primary Care Physician: Krzysztof Mcgraw MD  Principal Problem:<principal problem not specified>    Patient information was obtained from patient and past medical records.     Subjective:     Chief Complaint:  Afib     HPI:  75 year old female with severe AS, s/p TAVR, syncope, CVA, AF, HFpEF, DM2 and HTN who presents for PEDRO/DCCV. She is on Apixaban.     Dysphagia or odynophagia:  No  Liver Disease, esophageal disease, or known varices:  No  Upper GI Bleeding: No  Sleep Apnea:  No  Prior neck surgery or radiation:  No  History of anesthetic difficulties:  No  Family history of anesthetic difficulties:  No  Last oral intake:  12 hours ago  Able to move neck in all directions:  Yes  Anticoagulation/Antiplatelets: Apixaban    Lab Results   Component Value Date     02/03/2020     Lab Results   Component Value Date    HGB 13.4 02/03/2020     Lab Results   Component Value Date    INR 1.3 (H) 02/03/2020    INR 1.0 12/18/2019    INR 1.1 11/26/2019       TT ECHO:  TTE (2019):   · Concentric left ventricular hypertrophy.  · Normal left ventricular systolic function. The estimated ejection fraction is 60%.  · Normal right ventricular systolic function.  · Left ventricular diastolic dysfunction.  · Biatrial enlargement.  · There is a 26 mm Phan Suzi S3 transcutaneously-placed aortic bioprosthesis   present. There is no aortic insufficiency present. Mean gradient = 10mm Hg.  · Small posterior pericardial effusion.  · The estimated PA systolic pressure is 41 mm Hg  · Normal central venous pressure (3 mm Hg).    PEDRO:/EGD: None on file      Past Medical History:   Diagnosis Date    Acute on chronic diastolic (congestive) heart failure 11/20/2019    Anticoagulant long-term use     on Plavix since May  2015    Anxiety     Arthritis     Asthma     Atrial fibrillation     Cataracts, bilateral     Chest pain, atypical     Colon polyp     Coronary artery disease     Diabetes mellitus     Dry eyes     Esophageal erosions     GERD (gastroesophageal reflux disease)     Heart murmur     Hemorrhoid     High cholesterol     Hypertension     Irritable bowel syndrome     Shingles 2015    Stenosis of aortic and mitral valves     TIA (transient ischemic attack)     Use of cane as ambulatory aid        Past Surgical History:   Procedure Laterality Date    ABDOMINAL SURGERY      angiocele      2007, 2014    AORTIC VALVE REPLACEMENT N/A 11/19/2019    Procedure: Replacement-valve-aortic;  Surgeon: Jack Capone MD;  Location: [facility] CATH LAB;  Service: Cardiology;  Laterality: N/A;    BREAST SURGERY      left--- a lump--- no cancer    CARDIAC VALVE SURGERY      COLONOSCOPY  2014    COLONOSCOPY N/A 3/14/2018    Procedure: COLONOSCOPY;  Surgeon: Efren Kemp MD;  Location: [facility] ENDO ([address] [place] [address]);  Service: Endoscopy;  Laterality: N/A;  ok to hold Plavix 5 days prior to procedure per Dr RESHMA Hernandez     ok per Dr Kemp to hold Eliquis 2 days prior to procedure (see telephone encounter dated-2/7/18)    HERNIA REPAIR      HYSTERECTOMY  partial    1982 partial hysterectomy    LEFT HEART CATHETERIZATION Right 11/5/2019    Procedure: Left heart cath;  Surgeon: Jack Capone MD;  Location: [facility] CATH LAB;  Service: Cardiology;  Laterality: Right;    left nasal polyp      left neck lymph node      nose polyp      right hip fatty mass tissue      stent to small intestine      SUPERIOR VENA CAVA ANGIOPLASTY / STENTING      TONSILLECTOMY      TOTAL ABDOMINAL HYSTERECTOMY      2014    TOTAL KNEE ARTHROPLASTY Bilateral     UPPER GASTROINTESTINAL ENDOSCOPY  2014       Review of patient's allergies indicates:   Allergen Reactions    Celebrex [celecoxib] Shortness Of Breath    Ciprofloxacin Swelling     lip     Dicyclomine Hives    Adhesive Dermatitis    Avelox [moxifloxacin] Swelling    Cilostazol Other (See Comments)     Elevates blood pressure    Eryc [erythromycin] Other (See Comments)     unknown    Iodine and iodide containing products Hives    Keflex [cephalexin] Hives    Meclomen      rashes    Meloxicam      Ear ringing    Metoclopramide Other (See Comments)     High blood pressure    Sulfa (sulfonamide antibiotics) Itching       No current facility-administered medications on file prior to encounter.      Current Outpatient Medications on File Prior to Encounter   Medication Sig    acetaminophen (TYLENOL ARTHRITIS) 650 MG TbSR Take 1,300 mg by mouth 2 (two) times daily.    albuterol (ACCUNEB) 1.25 mg/3 mL Nebu Take scheduled q4 for the next two days while at home then resume prn dosing    albuterol (VENTOLIN HFA) 90 mcg/actuation inhaler Inhale 2 puffs into the lungs every 6 (six) hours as needed for Wheezing. Rescue    apixaban (ELIQUIS) 5 mg Tab Take 1 tablet (5 mg total) by mouth 2 (two) times daily.    ascorbic acid (VITAMIN C) 500 MG tablet Take 500 mg by mouth once daily.    azelastine (ASTELIN) 137 mcg nasal spray 1 spray by Nasal route 2 (two) times daily.    bisacodyl (DULCOLAX) 5 mg EC tablet Take 5 mg by mouth daily as needed for Constipation.    budesonide-formoterol 160-4.5 mcg (SYMBICORT) 160-4.5 mcg/actuation HFAA Inhale 2 puffs into the lungs every 12 (twelve) hours. Controller    calcium carbonate (OS-MICHAEL) 600 mg (1,500 mg) Tab Take 1,200 mg by mouth once daily.    cimetidine (TAGAMET) 200 MG tablet Take 300 mg by mouth.     cyanocobalamin (VITAMIN B-12) 1000 MCG tablet Take 100 mcg by mouth once daily.    diltiaZEM (CARDIZEM CD) 180 MG 24 hr capsule Take 1 capsule (180 mg total) by mouth once daily.    diphenhydrAMINE (BENADRYL) 25 mg capsule Take 50 mg by mouth.     DULoxetine (CYMBALTA) 20 MG capsule Take 1 capsule (20 mg total) by mouth 2 (two) times daily.     famotidine (PEPCID) 20 MG tablet Take 1 tablet (20 mg total) by mouth every 6 (six) hours. for 3 doses    fexofenadine (ALLEGRA) 60 MG tablet Take 60 mg by mouth every morning.    fish oil-omega-3 fatty acids 300-1,000 mg capsule Take 1 g by mouth once daily.     fluconazole (DIFLUCAN) 150 MG Tab TAKE 1 TABLET (150 MG TOTAL) BY MOUTH ONCE. FOR 1 DOSE    fluticasone (FLONASE) 50 mcg/actuation nasal spray 1 spray by Each Nare route every morning.     gabapentin (NEURONTIN) 300 MG capsule Take 2 capsules (600 mg total) by mouth 3 (three) times daily.    isosorbide mononitrate (IMDUR) 30 MG 24 hr tablet Take 1 tablet (30 mg total) by mouth once daily.    lisinopril (PRINIVIL,ZESTRIL) 20 MG tablet Take 1 tablet (20 mg total) by mouth 2 (two) times daily.    lorazepam (ATIVAN) 0.5 MG tablet Take 0.5 mg by mouth every morning.     magnesium 250 mg Tab Take 250 mg by mouth. 3 in am and 3 pm    metFORMIN (GLUCOPHAGE) 500 MG tablet Take 500 mg by mouth 2 (two) times daily.    nitroGLYCERIN (NITROSTAT) 0.4 MG SL tablet Place 1 tablet (0.4 mg total) under the tongue every 5 (five) minutes as needed for Chest pain.    nystatin (MYCOSTATIN) powder Apply topically 4 (four) times daily.    ondansetron (ZOFRAN-ODT) 4 MG TbDL DISSOLVE 1 TABLET ON TONGUE EVERY 8 HOURS AS NEEDED FOR NAUSEA    oxybutynin (DITROPAN-XL) 5 MG TR24 Take 5 mg by mouth every other day.     pantoprazole (PROTONIX) 40 MG tablet Take 1 tablet (40 mg total) by mouth once daily. (Patient taking differently: Take 40 mg by mouth every morning. )    POLYSORBATE 80/GLYCERIN (REFRESH DRY EYE THERAPY OPHT) Apply to eye 2 (two) times daily.    potassium gluconate 550 mg (90 mg) Tab Take by mouth. 2 in am and 2 pm    predniSONE (DELTASONE) 50 MG Tab Take by mouth.     traMADol (ULTRAM) 50 mg tablet Take 1 tablet (50 mg total) by mouth every 6 (six) hours as needed for Pain.    zafirlukast (ACCOLATE) 20 MG tablet Take 20 mg by mouth once daily.      Family History     None        Tobacco Use    Smoking status: Never Smoker    Smokeless tobacco: Never Used   Substance and Sexual Activity    Alcohol use: No    Drug use: No    Sexual activity: Not on file     Review of Systems   Constitution: Positive for malaise/fatigue. Negative for chills, fever and weight gain.   HENT: Negative for congestion.    Eyes: Negative for visual disturbance.   Cardiovascular: Negative for chest pain, claudication, dyspnea on exertion, leg swelling, orthopnea, palpitations and syncope.   Respiratory: Negative for cough, shortness of breath and snoring.    Hematologic/Lymphatic: Does not bruise/bleed easily.   Skin: Negative for rash.   Musculoskeletal: Negative for muscle cramps and myalgias.   Gastrointestinal: Negative for bloating, abdominal pain, constipation, diarrhea and melena.   Genitourinary: Negative for bladder incontinence.   Neurological: Negative for excessive daytime sleepiness, focal weakness and weakness.   Psychiatric/Behavioral: Negative for depression and suicidal ideas.     Objective:     Vital Signs (Most Recent):    Vital Signs (24h Range):  BP: ()/()   Arterial Line BP: ()/()         There is no height or weight on file to calculate BMI.            No intake or output data in the 24 hours ending 02/10/20 0835    Lines/Drains/Airways     None                 Physical Exam   Constitutional: She is oriented to person, place, and time. She appears well-developed and well-nourished. No distress.   HENT:   Head: Normocephalic and atraumatic.   Mouth/Throat: Oropharynx is clear and moist.   Eyes: Pupils are equal, round, and reactive to light. Conjunctivae and EOM are normal. No scleral icterus.   Neck: Normal range of motion. Neck supple. No JVD present.   Cardiovascular: Normal rate, normal heart sounds and intact distal pulses. An irregularly irregular rhythm present.   No murmur heard.  Pulses:       Radial pulses are 2+ on the right side, and 2+ on the  left side.   Pulmonary/Chest: Effort normal and breath sounds normal. No respiratory distress.   Symmetrical expansion   Abdominal: Soft. Bowel sounds are normal. There is no hepatosplenomegaly. There is no tenderness.   Musculoskeletal: Normal range of motion. She exhibits no edema.   Neurological: She is alert and oriented to person, place, and time. No cranial nerve deficit.   Skin: Skin is warm and dry. No rash noted. She is not diaphoretic.   Psychiatric: She has a normal mood and affect. Judgment and thought content normal.         Assessment and Plan:     Atrial fibrillation, chronic  1. PEDRO for evaluation of Atrial thrombus prior to PEDRO    -No absolute contraindications of esophageal stricture, tumor, perforation, laceration,or diverticulum and/or active GI bleed  -The risks, benefits & alternatives of the procedure were explained to the patient.   -The risks of transesophageal echo include but are not limited to:  Dental trauma, esophageal trauma/perforation, bleeding, laryngospasm/brochospasm, aspiration, sore throat/hoarseness, & dislodgement of the endotracheal tube/nasogastric tube (where applicable).    -The risks of moderate sedation include hypotension, respiratory depression, arrhythmias, bronchospasm, & death.    -Informed consent was obtained. The patient is agreeable to proceed with the procedure and all questions and concerns addressed.    Case discussed with an attending in echocardiography lab.     Further recommendations per attending addendum          VTE Risk Mitigation (From admission, onward)    None          Froilan Weston MD  Cardiology   [facility]

## 2020-02-10 NOTE — SUBJECTIVE & OBJECTIVE
Past Medical History:   Diagnosis Date    Acute on chronic diastolic (congestive) heart failure 11/20/2019    Anticoagulant long-term use     on Plavix since May 2015    Anxiety     Arthritis     Asthma     Atrial fibrillation     Cataracts, bilateral     Chest pain, atypical     Colon polyp     Coronary artery disease     Diabetes mellitus     Dry eyes     Esophageal erosions     GERD (gastroesophageal reflux disease)     Heart murmur     Hemorrhoid     High cholesterol     Hypertension     Irritable bowel syndrome     Shingles 2015    Stenosis of aortic and mitral valves     TIA (transient ischemic attack)     Use of cane as ambulatory aid        Past Surgical History:   Procedure Laterality Date    ABDOMINAL SURGERY      angiocele      2007, 2014    AORTIC VALVE REPLACEMENT N/A 11/19/2019    Procedure: Replacement-valve-aortic;  Surgeon: Jack Capone MD;  Location: [facility] CATH LAB;  Service: Cardiology;  Laterality: N/A;    BREAST SURGERY      left--- a lump--- no cancer    CARDIAC VALVE SURGERY      COLONOSCOPY  2014    COLONOSCOPY N/A 3/14/2018    Procedure: COLONOSCOPY;  Surgeon: Efren Kemp MD;  Location: [facility] ENDO ([address] [place] [address]);  Service: Endoscopy;  Laterality: N/A;  ok to hold Plavix 5 days prior to procedure per Dr RESHMA Hernandez     ok per Dr Kemp to hold Eliquis 2 days prior to procedure (see telephone encounter dated-2/7/18)    HERNIA REPAIR      HYSTERECTOMY  partial    1982 partial hysterectomy    LEFT HEART CATHETERIZATION Right 11/5/2019    Procedure: Left heart cath;  Surgeon: Jack Capone MD;  Location: [facility] CATH LAB;  Service: Cardiology;  Laterality: Right;    left nasal polyp      left neck lymph node      nose polyp      right hip fatty mass tissue      stent to small intestine      SUPERIOR VENA CAVA ANGIOPLASTY / STENTING      TONSILLECTOMY      TOTAL ABDOMINAL HYSTERECTOMY      2014    TOTAL KNEE ARTHROPLASTY Bilateral     UPPER GASTROINTESTINAL  ENDOSCOPY  2014       Review of patient's allergies indicates:   Allergen Reactions    Celebrex [celecoxib] Shortness Of Breath    Ciprofloxacin Swelling     lip    Dicyclomine Hives    Adhesive Dermatitis    Avelox [moxifloxacin] Swelling    Cilostazol Other (See Comments)     Elevates blood pressure    Eryc [erythromycin] Other (See Comments)     unknown    Iodine and iodide containing products Hives    Keflex [cephalexin] Hives    Meclomen      rashes    Meloxicam      Ear ringing    Metoclopramide Other (See Comments)     High blood pressure    Sulfa (sulfonamide antibiotics) Itching       No current facility-administered medications on file prior to encounter.      Current Outpatient Medications on File Prior to Encounter   Medication Sig    acetaminophen (TYLENOL ARTHRITIS) 650 MG TbSR Take 1,300 mg by mouth 2 (two) times daily.    albuterol (ACCUNEB) 1.25 mg/3 mL Nebu Take scheduled q4 for the next two days while at home then resume prn dosing    albuterol (VENTOLIN HFA) 90 mcg/actuation inhaler Inhale 2 puffs into the lungs every 6 (six) hours as needed for Wheezing. Rescue    apixaban (ELIQUIS) 5 mg Tab Take 1 tablet (5 mg total) by mouth 2 (two) times daily.    ascorbic acid (VITAMIN C) 500 MG tablet Take 500 mg by mouth once daily.    azelastine (ASTELIN) 137 mcg nasal spray 1 spray by Nasal route 2 (two) times daily.    bisacodyl (DULCOLAX) 5 mg EC tablet Take 5 mg by mouth daily as needed for Constipation.    budesonide-formoterol 160-4.5 mcg (SYMBICORT) 160-4.5 mcg/actuation HFAA Inhale 2 puffs into the lungs every 12 (twelve) hours. Controller    calcium carbonate (OS-MICHAEL) 600 mg (1,500 mg) Tab Take 1,200 mg by mouth once daily.    cimetidine (TAGAMET) 200 MG tablet Take 300 mg by mouth.     cyanocobalamin (VITAMIN B-12) 1000 MCG tablet Take 100 mcg by mouth once daily.    diltiaZEM (CARDIZEM CD) 180 MG 24 hr capsule Take 1 capsule (180 mg total) by mouth once daily.     diphenhydrAMINE (BENADRYL) 25 mg capsule Take 50 mg by mouth.     DULoxetine (CYMBALTA) 20 MG capsule Take 1 capsule (20 mg total) by mouth 2 (two) times daily.    famotidine (PEPCID) 20 MG tablet Take 1 tablet (20 mg total) by mouth every 6 (six) hours. for 3 doses    fexofenadine (ALLEGRA) 60 MG tablet Take 60 mg by mouth every morning.    fish oil-omega-3 fatty acids 300-1,000 mg capsule Take 1 g by mouth once daily.     fluconazole (DIFLUCAN) 150 MG Tab TAKE 1 TABLET (150 MG TOTAL) BY MOUTH ONCE. FOR 1 DOSE    fluticasone (FLONASE) 50 mcg/actuation nasal spray 1 spray by Each Nare route every morning.     gabapentin (NEURONTIN) 300 MG capsule Take 2 capsules (600 mg total) by mouth 3 (three) times daily.    isosorbide mononitrate (IMDUR) 30 MG 24 hr tablet Take 1 tablet (30 mg total) by mouth once daily.    lisinopril (PRINIVIL,ZESTRIL) 20 MG tablet Take 1 tablet (20 mg total) by mouth 2 (two) times daily.    lorazepam (ATIVAN) 0.5 MG tablet Take 0.5 mg by mouth every morning.     magnesium 250 mg Tab Take 250 mg by mouth. 3 in am and 3 pm    metFORMIN (GLUCOPHAGE) 500 MG tablet Take 500 mg by mouth 2 (two) times daily.    nitroGLYCERIN (NITROSTAT) 0.4 MG SL tablet Place 1 tablet (0.4 mg total) under the tongue every 5 (five) minutes as needed for Chest pain.    nystatin (MYCOSTATIN) powder Apply topically 4 (four) times daily.    ondansetron (ZOFRAN-ODT) 4 MG TbDL DISSOLVE 1 TABLET ON TONGUE EVERY 8 HOURS AS NEEDED FOR NAUSEA    oxybutynin (DITROPAN-XL) 5 MG TR24 Take 5 mg by mouth every other day.     pantoprazole (PROTONIX) 40 MG tablet Take 1 tablet (40 mg total) by mouth once daily. (Patient taking differently: Take 40 mg by mouth every morning. )    POLYSORBATE 80/GLYCERIN (REFRESH DRY EYE THERAPY OPHT) Apply to eye 2 (two) times daily.    potassium gluconate 550 mg (90 mg) Tab Take by mouth. 2 in am and 2 pm    predniSONE (DELTASONE) 50 MG Tab Take by mouth.     traMADol (ULTRAM) 50  mg tablet Take 1 tablet (50 mg total) by mouth every 6 (six) hours as needed for Pain.    zafirlukast (ACCOLATE) 20 MG tablet Take 20 mg by mouth once daily.     Family History     None        Tobacco Use    Smoking status: Never Smoker    Smokeless tobacco: Never Used   Substance and Sexual Activity    Alcohol use: No    Drug use: No    Sexual activity: Not on file     Review of Systems   Constitution: Positive for malaise/fatigue. Negative for chills, fever and weight gain.   HENT: Negative for congestion.    Eyes: Negative for visual disturbance.   Cardiovascular: Negative for chest pain, claudication, dyspnea on exertion, leg swelling, orthopnea, palpitations and syncope.   Respiratory: Negative for cough, shortness of breath and snoring.    Hematologic/Lymphatic: Does not bruise/bleed easily.   Skin: Negative for rash.   Musculoskeletal: Negative for muscle cramps and myalgias.   Gastrointestinal: Negative for bloating, abdominal pain, constipation, diarrhea and melena.   Genitourinary: Negative for bladder incontinence.   Neurological: Negative for excessive daytime sleepiness, focal weakness and weakness.   Psychiatric/Behavioral: Negative for depression and suicidal ideas.     Objective:     Vital Signs (Most Recent):    Vital Signs (24h Range):  BP: ()/()   Arterial Line BP: ()/()         There is no height or weight on file to calculate BMI.            No intake or output data in the 24 hours ending 02/10/20 0835    Lines/Drains/Airways     None                 Physical Exam   Constitutional: She is oriented to person, place, and time. She appears well-developed and well-nourished. No distress.   HENT:   Head: Normocephalic and atraumatic.   Mouth/Throat: Oropharynx is clear and moist.   Eyes: Pupils are equal, round, and reactive to light. Conjunctivae and EOM are normal. No scleral icterus.   Neck: Normal range of motion. Neck supple. No JVD present.   Cardiovascular: Normal rate, normal heart  sounds and intact distal pulses. An irregularly irregular rhythm present.   No murmur heard.  Pulses:       Radial pulses are 2+ on the right side, and 2+ on the left side.   Pulmonary/Chest: Effort normal and breath sounds normal. No respiratory distress.   Symmetrical expansion   Abdominal: Soft. Bowel sounds are normal. There is no hepatosplenomegaly. There is no tenderness.   Musculoskeletal: Normal range of motion. She exhibits no edema.   Neurological: She is alert and oriented to person, place, and time. No cranial nerve deficit.   Skin: Skin is warm and dry. No rash noted. She is not diaphoretic.   Psychiatric: She has a normal mood and affect. Judgment and thought content normal.

## 2020-02-10 NOTE — ASSESSMENT & PLAN NOTE
1. PEDRO for evaluation of Atrial thrombus prior to PEDRO    -No absolute contraindications of esophageal stricture, tumor, perforation, laceration,or diverticulum and/or active GI bleed  -The risks, benefits & alternatives of the procedure were explained to the patient.   -The risks of transesophageal echo include but are not limited to:  Dental trauma, esophageal trauma/perforation, bleeding, laryngospasm/brochospasm, aspiration, sore throat/hoarseness, & dislodgement of the endotracheal tube/nasogastric tube (where applicable).    -The risks of moderate sedation include hypotension, respiratory depression, arrhythmias, bronchospasm, & death.    -Informed consent was obtained. The patient is agreeable to proceed with the procedure and all questions and concerns addressed.    Case discussed with an attending in echocardiography lab.     Further recommendations per attending addendum

## 2020-02-10 NOTE — PROGRESS NOTES
Patient admitted to recovery see [facility] for complete assessment pacu bcg's maintained safety measures verified patient sedated at this time called for ekg and it was done and placed in patient's chart also called patient's daughter and updated on patient location also ekg done and joby whitt here  Looking at ekg. Also angeline giving 30 day monitor to patient's daughter .

## 2020-02-10 NOTE — ANESTHESIA POSTPROCEDURE EVALUATION
Anesthesia Post Evaluation    Patient: Adriana Strong    Procedure(s) Performed: Procedure(s) (LRB):  CARDIOVERSION (N/A)  Transesophageal echo (PEDRO) intra-procedure log documentation (N/A)    Final Anesthesia Type: general    Patient location during evaluation: PACU  Patient participation: Yes- Able to Participate  Level of consciousness: awake and alert and oriented  Post-procedure vital signs: reviewed and stable  Pain management: adequate  Airway patency: patent    PONV status at discharge: No PONV  Anesthetic complications: no      Cardiovascular status: blood pressure returned to baseline and hemodynamically stable  Respiratory status: unassisted, room air and spontaneous ventilation  Hydration status: euvolemic  Follow-up not needed.          Vitals Value Taken Time   /91 2/10/2020 11:52 AM   Temp 36.4 °C (97.6 °F) 2/10/2020 11:45 AM   Pulse 101 2/10/2020 11:59 AM   Resp 17 2/10/2020 11:59 AM   SpO2 98 % 2/10/2020 11:59 AM   Vitals shown include unvalidated device data.      No case tracking events are documented in the log.      Pain/Naresh Score: Naresh Score: 10 (2/10/2020 11:30 AM)

## 2020-02-10 NOTE — LETTER
December 18, 2019    Adriana Strong  [address] [place] [address]     Patient Instructions  PEDRO (Transesophageal Echocardiogram) with Cardioversion       Important Medication Information:    · HOLD (do NOT take) Metformin on the day of your procedure.  Your last dose should be taken on 1/5/20.  · You may take your other usual morning medications with a sip of water on the day of your procedure.       Day of Procedure:    1/6/20 at 9 AM   Please report to Cardiology Waiting Room on the 3rd floor of the Hospital    · Do not eat or drink anything after 12 midnight on the night before your procedure.  · Please do not wear makeup, especially mascara, when arriving for your procedure.  · You will be going home after your procedure.  · You will need someone to drive you home from the hospital due to anesthesia.     Directions to Cardiology Waiting Room  If you park in the Parking Garage:  Take elevators to the 2nd floor  Walk up ramp and turn right by Gold Elevators  Take elevator to the 3rd floor  Upon exiting the elevator, turn away from the clinic areas  Walk long fuchs around to front of hospital to area with windows overlooking [facility]  Check in at Reception Desk  OR  If family is dropping you off:  Have them drop you off at the front of the Hospital  (Near the ER, where all the flags are hung).  Take the E elevators to the 3rd floor.  Check in at the Reception Desk in the waiting room.      Any need to reschedule or cancel procedures, or any questions regarding your procedures should be addressed directly with the Arrhythmia Department Nurses at the following phone number: [phone].

## 2020-02-10 NOTE — HPI
75 year old female with severe AS, s/p TAVR, syncope, CVA, AF, HFpEF, DM2 and HTN who presents for PEDRO/DCCV. She is on Apixaban.     Dysphagia or odynophagia:  No  Liver Disease, esophageal disease, or known varices:  No  Upper GI Bleeding: No  Sleep Apnea:  No  Prior neck surgery or radiation:  No  History of anesthetic difficulties:  No  Family history of anesthetic difficulties:  No  Last oral intake:  12 hours ago  Able to move neck in all directions:  Yes  Anticoagulation/Antiplatelets: Apixaban    Lab Results   Component Value Date     02/03/2020     Lab Results   Component Value Date    HGB 13.4 02/03/2020     Lab Results   Component Value Date    INR 1.3 (H) 02/03/2020    INR 1.0 12/18/2019    INR 1.1 11/26/2019       TT ECHO:  TTE (2019):   · Concentric left ventricular hypertrophy.  · Normal left ventricular systolic function. The estimated ejection fraction is 60%.  · Normal right ventricular systolic function.  · Left ventricular diastolic dysfunction.  · Biatrial enlargement.  · There is a 26 mm Phan Suzi S3 transcutaneously-placed aortic bioprosthesis   present. There is no aortic insufficiency present. Mean gradient = 10mm Hg.  · Small posterior pericardial effusion.  · The estimated PA systolic pressure is 41 mm Hg  · Normal central venous pressure (3 mm Hg).    PEDRO:/EGD: None on file

## 2020-02-10 NOTE — ANESTHESIA PREPROCEDURE EVALUATION
02/10/2020  Adriana Strong is a 75 y.o., female.  who presents for PEDRO for persistent Afib. She has past medical history of permanent-Afib on Eliquis, Severe AS s/p TAVR, HLD, CVA/TIA without residual deficits, NIDDM type II with neuropathy, Mesenteric ischemic (LIZ stents, SMA  01/2016), GERD, provoked DVT, HFpEF, HTN, iodine allergy (Hives).        Prev airway: Grade 1 on DL    Patient Active Problem List   Diagnosis    Enlarged ovary    Controlled type 2 diabetes mellitus with diabetic polyneuropathy, without long-term current use of insulin    Asthma    Chronic mesenteric ischemia    Mesenteric artery insufficiency    Gastroesophageal reflux disease    Essential hypertension    Hyperlipidemia    Warfarin anticoagulation    Old lacunar stroke without late effect    Constipation    Severe aortic stenosis    Umbilical hernia without obstruction and without gangrene    BMI 38.0-38.9,adult    Incisional hernia, without obstruction or gangrene    COPD (chronic obstructive pulmonary disease)    Atrial fibrillation, chronic    Costochondritis    Diabetic polyneuropathy associated with type 2 diabetes mellitus    DDD (degenerative disc disease), lumbar    Cataract    Low back pain    Difficulty walking    Muscle weakness    Balance problems    Decreased range of motion of trunk and back    Lumbar spondylosis    Neuroforaminal stenosis of lumbar spine    Spinal stenosis of lumbar region with neurogenic claudication    Lumbar radiculopathy    S/P TAVR (transcatheter aortic valve replacement)    Morbid obesity    Acute on chronic diastolic (congestive) heart failure    Vasovagal syncope    TACOS (acute kidney injury)    Headache    Persistent atrial fibrillation       Review of patient's allergies indicates:   Allergen Reactions    Celebrex [celecoxib] Shortness Of Breath     Ciprofloxacin Swelling     lip    Dicyclomine Hives    Adhesive Dermatitis    Avelox [moxifloxacin] Swelling    Cilostazol Other (See Comments)     Elevates blood pressure    Eryc [erythromycin] Other (See Comments)     unknown    Iodine and iodide containing products Hives    Keflex [cephalexin] Hives    Meclomen      rashes    Meloxicam      Ear ringing    Metoclopramide Other (See Comments)     High blood pressure    Sulfa (sulfonamide antibiotics) Itching        Current Facility-Administered Medications on File Prior to Visit   Medication Dose Route Frequency Provider Last Rate Last Dose    0.9%  NaCl infusion   Intravenous Continuous Lynette Hightower NP        sodium chloride 0.9% flush 5 mL  5 mL Intravenous PRN Lynette Hightower NP         Current Outpatient Medications on File Prior to Visit   Medication Sig Dispense Refill    acetaminophen (TYLENOL ARTHRITIS) 650 MG TbSR Take 1,300 mg by mouth 2 (two) times daily.      albuterol (ACCUNEB) 1.25 mg/3 mL Nebu Take scheduled q4 for the next two days while at home then resume prn dosing 3 mL 1    albuterol (VENTOLIN HFA) 90 mcg/actuation inhaler Inhale 2 puffs into the lungs every 6 (six) hours as needed for Wheezing. Rescue      apixaban (ELIQUIS) 5 mg Tab Take 1 tablet (5 mg total) by mouth 2 (two) times daily. 180 tablet 3    ascorbic acid (VITAMIN C) 500 MG tablet Take 500 mg by mouth once daily.      azelastine (ASTELIN) 137 mcg nasal spray 1 spray by Nasal route 2 (two) times daily.      bisacodyl (DULCOLAX) 5 mg EC tablet Take 5 mg by mouth daily as needed for Constipation.      budesonide-formoterol 160-4.5 mcg (SYMBICORT) 160-4.5 mcg/actuation HFAA Inhale 2 puffs into the lungs every 12 (twelve) hours. Controller      calcium carbonate (OS-MICHAEL) 600 mg (1,500 mg) Tab Take 1,200 mg by mouth once daily.      cimetidine (TAGAMET) 200 MG tablet Take 300 mg by mouth.       clopidogrel (PLAVIX) 75 mg tablet Take 1 tablet (75 mg  total) by mouth once daily. 90 tablet 3    cyanocobalamin (VITAMIN B-12) 1000 MCG tablet Take 100 mcg by mouth once daily.      diltiaZEM (CARDIZEM CD) 180 MG 24 hr capsule Take 1 capsule (180 mg total) by mouth once daily. 90 capsule 3    diphenhydrAMINE (BENADRYL) 25 mg capsule Take 50 mg by mouth.       DULoxetine (CYMBALTA) 20 MG capsule Take 1 capsule (20 mg total) by mouth 2 (two) times daily. 180 capsule 3    famotidine (PEPCID) 20 MG tablet Take 1 tablet (20 mg total) by mouth every 6 (six) hours. for 3 doses 3 tablet 0    fexofenadine (ALLEGRA) 60 MG tablet Take 60 mg by mouth every morning.      fish oil-omega-3 fatty acids 300-1,000 mg capsule Take 1 g by mouth once daily.       fluconazole (DIFLUCAN) 150 MG Tab TAKE 1 TABLET (150 MG TOTAL) BY MOUTH ONCE. FOR 1 DOSE  0    fluticasone (FLONASE) 50 mcg/actuation nasal spray 1 spray by Each Nare route every morning.       furosemide (LASIX) 40 MG tablet TAKE 1 TABLET BY MOUTH EVERY DAY 90 tablet 3    gabapentin (NEURONTIN) 300 MG capsule Take 2 capsules (600 mg total) by mouth 3 (three) times daily. 540 capsule 3    isosorbide mononitrate (IMDUR) 30 MG 24 hr tablet Take 1 tablet (30 mg total) by mouth once daily. 30 tablet 11    lisinopril (PRINIVIL,ZESTRIL) 20 MG tablet Take 1 tablet (20 mg total) by mouth 2 (two) times daily. 180 tablet 3    lorazepam (ATIVAN) 0.5 MG tablet Take 0.5 mg by mouth every morning.       magnesium 250 mg Tab Take 250 mg by mouth. 3 in am and 3 pm      metFORMIN (GLUCOPHAGE) 500 MG tablet Take 500 mg by mouth 2 (two) times daily.  1    nitroGLYCERIN (NITROSTAT) 0.4 MG SL tablet Place 1 tablet (0.4 mg total) under the tongue every 5 (five) minutes as needed for Chest pain. 30 tablet 11    nystatin (MYCOSTATIN) powder Apply topically 4 (four) times daily. 1 Bottle 0    ondansetron (ZOFRAN-ODT) 4 MG TbDL DISSOLVE 1 TABLET ON TONGUE EVERY 8 HOURS AS NEEDED FOR NAUSEA  1    oxybutynin (DITROPAN-XL) 5 MG TR24 Take 5  mg by mouth every other day.       pantoprazole (PROTONIX) 40 MG tablet Take 1 tablet (40 mg total) by mouth once daily. (Patient taking differently: Take 40 mg by mouth every morning. ) 90 tablet 3    POLYSORBATE 80/GLYCERIN (REFRESH DRY EYE THERAPY OPHT) Apply to eye 2 (two) times daily.      potassium gluconate 550 mg (90 mg) Tab Take by mouth. 2 in am and 2 pm      predniSONE (DELTASONE) 50 MG Tab Take by mouth.       traMADol (ULTRAM) 50 mg tablet Take 1 tablet (50 mg total) by mouth every 6 (six) hours as needed for Pain. 20 tablet 0    zafirlukast (ACCOLATE) 20 MG tablet Take 20 mg by mouth once daily.  3       Past Surgical History:   Procedure Laterality Date    ABDOMINAL SURGERY      stents to SMA    angiocele      2007, 2014    AORTIC VALVE REPLACEMENT N/A 11/19/2019    Procedure: Replacement-valve-aortic;  Surgeon: Jack Capone MD;  Location: [facility] CATH LAB;  Service: Cardiology;  Laterality: N/A;    BREAST SURGERY      left--- a lump--- no cancer    CARDIAC VALVE SURGERY      COLONOSCOPY  2014    COLONOSCOPY N/A 3/14/2018    Procedure: COLONOSCOPY;  Surgeon: Efren Kemp MD;  Location: [facility] ENDO ([address] [place] [address]);  Service: Endoscopy;  Laterality: N/A;  ok to hold Plavix 5 days prior to procedure per Dr RESHAM Hernandez     ok per Dr Kemp to hold Eliquis 2 days prior to procedure (see telephone encounter dated-2/7/18)    HERNIA REPAIR      HYSTERECTOMY  partial    1982 partial hysterectomy    LEFT HEART CATHETERIZATION Right 11/5/2019    Procedure: Left heart cath;  Surgeon: Jack Capone MD;  Location: [facility] CATH LAB;  Service: Cardiology;  Laterality: Right;    left nasal polyp      left neck lymph node      nose polyp      right hip fatty mass tissue      stent to small intestine      SUPERIOR VENA CAVA ANGIOPLASTY / STENTING      TONSILLECTOMY      TOTAL ABDOMINAL HYSTERECTOMY      2014    TOTAL KNEE ARTHROPLASTY Bilateral     UPPER GASTROINTESTINAL ENDOSCOPY  2014       Social History      Socioeconomic History    Marital status:      Spouse name: Not on file    Number of children: Not on file    Years of education: Not on file    Highest education level: Not on file   Occupational History    Not on file   Social Needs    Financial resource strain: Not on file    Food insecurity:     Worry: Not on file     Inability: Not on file    Transportation needs:     Medical: Not on file     Non-medical: Not on file   Tobacco Use    Smoking status: Never Smoker    Smokeless tobacco: Never Used   Substance and Sexual Activity    Alcohol use: No    Drug use: No    Sexual activity: Not on file   Lifestyle    Physical activity:     Days per week: Not on file     Minutes per session: Not on file    Stress: Not on file   Relationships    Social connections:     Talks on phone: Not on file     Gets together: Not on file     Attends Islam service: Not on file     Active member of club or organization: Not on file     Attends meetings of clubs or organizations: Not on file     Relationship status: Not on file   Other Topics Concern    Not on file   Social History Narrative    Not on file         Vital Signs Range (Last 24H):  Temp:  [36.4 °C (97.5 °F)]   Pulse:  [97]   Resp:  [18]   BP: (115-121)/(77-80)   SpO2:  [98 %]       CBC: No results for input(s): WBC, RBC, HGB, HCT, PLT, MCV, MCH, MCHC in the last 72 hours.    CMP: No results for input(s): NA, K, CL, CO2, BUN, CREATININE, GLU, MG, PHOS, CALCIUM, ALBUMIN, PROT, ALKPHOS, ALT, AST, BILITOT in the last 72 hours.        Pre-op Assessment    I have reviewed the Patient Summary Reports.     I have reviewed the Nursing Notes.   I have reviewed the Medications.     Review of Systems  Anesthesia Hx:  No problems with previous Anesthesia   Denies Personal Hx of Anesthesia complications.   Social:  Non-Smoker  Denies Tobacco Use.   Cardiovascular:   Exercise tolerance: poor Hypertension, well controlled Valvular problems/Murmurs, AS Denies  MI. CAD  asymptomatic  Denies CABG/stent. Dysrhythmias atrial fibrillation CHF PVD hyperlipidemia ECG has been reviewed.  Coronary Artery Disease:  patient problem list, patient hx of diagnosis.  Valvular Heart Disease: Aortic Stenosis (AS), s/p replacement    Congestive Heart Failure (CHF) , Chronic Congestive Heart Failure  Deep Venous Thrombosis (DVT), Hx of DVT  Hypertension, Essential Hypertension , Pt in normal range, systolic < 130 and diastolic < 85, Well Controlled on Rx  Disorder of Cardiac Rhythm, Atrial Fibrillation, Chronic Atrial Fibrillation, controlled ventricular response    Pulmonary:   COPD, mild Asthma asymptomatic and mild Denies Shortness of breath.  Denies Recent URI.  Asthma:  last episode was 1 - 12 months ago. Emergency visits this year is one. Chronic Obstructive Pulmonary Disease (COPD):  is unrelated to smoking. Inhaler use is maintenance inhaler daily and rescue inhaler PRN. Oral/Intravenous steroid use is current steroid Rx. Current breathing status is optimal, free of wheezing.    Renal/:   Denies Chronic Renal Disease.   Denies Kidney Function/Disease    Hepatic/GI:   GERD, well controlled Denies Liver Disease.  Esophageal / Stomach Disorders Gerd Controlled by chronic antireflux medication.  Denies Liver Disease    Neurological:   TIA, CVA, no residual symptoms Denies Seizures.  Denies Seizure Disorder  CVA - Cerebrovasular Accident , has had 1 stroke , residual deficits are no residual deficit.  Denies TIA - Transient Ischemic Attack    Endocrine:   Diabetes, well controlled, type 2 Denies Hypothyroidism.  Diabetes, Type 2 Diabetes , controlled by oral hypoglycemics. , most recent HgA1c value was 6.5 on 11/20/19.  Denies Thyroid Disease  Metabolic Disorders, Hyperlipoproteinemia, hypercholesterolemia, controlled on medication, Obesity / BMI > 30      Physical Exam  General:  Obesity    Airway/Jaw/Neck:  Airway Findings: Mouth Opening: Small, but > 3cm Tongue: Normal  General  Airway Assessment: Adult  Mallampati: III  TM Distance: 4 - 6 cm  Jaw/Neck Findings:  Neck ROM: Extension Decreased, Mild      Dental:  Dental Findings: Periodontal disease, Mild    Chest/Lungs:  Chest/Lungs Findings: Clear to auscultation, Normal Respiratory Rate     Heart/Vascular:  Heart Findings: Rate: Normal  Rhythm: Irregularly Irregular  Sounds: Normal  Heart murmur: negative       Mental Status:  Mental Status Findings:  Cooperative, Alert and Oriented         Anesthesia Plan  Type of Anesthesia, risks & benefits discussed:  Anesthesia Type:  general  Patient's Preference:   Intra-op Monitoring Plan: standard ASA monitors  Intra-op Monitoring Plan Comments:   Post Op Pain Control Plan: per primary service following discharge from PACU, IV/PO Opioids PRN and multimodal analgesia  Post Op Pain Control Plan Comments:   Induction:   IV  Beta Blocker:  Patient is not currently on a Beta-Blocker (No further documentation required).       Informed Consent: Patient understands risks and agrees with Anesthesia plan.  Questions answered. Anesthesia consent signed with patient.  ASA Score: 3     Day of Surgery Review of History & Physical:    H&P update referred to the surgeon.         Ready For Surgery From Anesthesia Perspective.

## 2020-02-10 NOTE — NURSING TRANSFER
Nursing Transfer Note      2/10/2020     Transfer To: [facility]    Transfer via stretcher    Transfer with reported to [facility]    Transported by [facility]    Medicines sent: none    Chart send with patient: Yes    Notified: reported to [facility]ia [facility]    Patient reassessed at: see epic (date, time)    Upon arrival to floor: to room no complaints no distress noted.

## 2020-02-10 NOTE — TRANSFER OF CARE
"Anesthesia Transfer of Care Note    Patient: Adriana Strong    Procedure(s) Performed: Procedure(s) (LRB):  CARDIOVERSION (N/A)  Transesophageal echo (PEDRO) intra-procedure log documentation (N/A)    Patient location: PACU    Anesthesia Type: general    Transport from OR: Transported from OR on 2-3 L/min O2 by NC with adequate spontaneous ventilation    Post pain: adequate analgesia    Post assessment: no apparent anesthetic complications and tolerated procedure well    Post vital signs: stable    Level of consciousness: sedated and responds to stimulation    Nausea/Vomiting: no nausea/vomiting    Complications: none    Transfer of care protocol was followed      Last vitals:   Visit Vitals  /80 (BP Location: Left arm, Patient Position: Lying)   Pulse 97   Temp 36.4 °C (97.5 °F) (Oral)   Resp 18   Ht 5' 4" (1.626 m)   Wt 103.4 kg (228 lb)   LMP 01/21/1973 (Approximate)   SpO2 98%   Breastfeeding? No   BMI 39.14 kg/m²     "

## 2020-02-11 ENCOUNTER — OFFICE VISIT (OUTPATIENT)
Dept: CARDIOLOGY | Facility: CLINIC | Age: 75
End: 2020-02-11
Payer: MEDICARE

## 2020-02-11 VITALS
WEIGHT: 232.13 LBS | SYSTOLIC BLOOD PRESSURE: 122 MMHG | HEIGHT: 64 IN | DIASTOLIC BLOOD PRESSURE: 70 MMHG | OXYGEN SATURATION: 98 % | BODY MASS INDEX: 39.63 KG/M2 | HEART RATE: 127 BPM

## 2020-02-11 DIAGNOSIS — Z95.2 S/P TAVR (TRANSCATHETER AORTIC VALVE REPLACEMENT): Primary | ICD-10-CM

## 2020-02-11 DIAGNOSIS — E78.00 PURE HYPERCHOLESTEROLEMIA: ICD-10-CM

## 2020-02-11 DIAGNOSIS — Z79.01 WARFARIN ANTICOAGULATION: ICD-10-CM

## 2020-02-11 DIAGNOSIS — E11.42 DIABETIC POLYNEUROPATHY ASSOCIATED WITH TYPE 2 DIABETES MELLITUS: ICD-10-CM

## 2020-02-11 DIAGNOSIS — K21.9 GASTROESOPHAGEAL REFLUX DISEASE, ESOPHAGITIS PRESENCE NOT SPECIFIED: ICD-10-CM

## 2020-02-11 DIAGNOSIS — I48.19 PERSISTENT ATRIAL FIBRILLATION: ICD-10-CM

## 2020-02-11 DIAGNOSIS — J45.20 MILD INTERMITTENT ASTHMA WITHOUT COMPLICATION: ICD-10-CM

## 2020-02-11 DIAGNOSIS — I10 ESSENTIAL HYPERTENSION: ICD-10-CM

## 2020-02-11 DIAGNOSIS — E66.01 MORBID OBESITY: ICD-10-CM

## 2020-02-11 DIAGNOSIS — Z86.73 OLD LACUNAR STROKE WITHOUT LATE EFFECT: ICD-10-CM

## 2020-02-11 DIAGNOSIS — E11.42 CONTROLLED TYPE 2 DIABETES MELLITUS WITH DIABETIC POLYNEUROPATHY, WITHOUT LONG-TERM CURRENT USE OF INSULIN: ICD-10-CM

## 2020-02-11 DIAGNOSIS — K55.1 MESENTERIC ARTERY INSUFFICIENCY: ICD-10-CM

## 2020-02-11 PROCEDURE — 3074F SYST BP LT 130 MM HG: CPT | Mod: CPTII,S$GLB,, | Performed by: INTERNAL MEDICINE

## 2020-02-11 PROCEDURE — 93000 ELECTROCARDIOGRAM COMPLETE: CPT | Mod: S$GLB,,, | Performed by: INTERNAL MEDICINE

## 2020-02-11 PROCEDURE — 1159F PR MEDICATION LIST DOCUMENTED IN MEDICAL RECORD: ICD-10-PCS | Mod: S$GLB,,, | Performed by: INTERNAL MEDICINE

## 2020-02-11 PROCEDURE — 93000 EKG 12-LEAD: ICD-10-PCS | Mod: S$GLB,,, | Performed by: INTERNAL MEDICINE

## 2020-02-11 PROCEDURE — 1159F MED LIST DOCD IN RCRD: CPT | Mod: S$GLB,,, | Performed by: INTERNAL MEDICINE

## 2020-02-11 PROCEDURE — 99999 PR PBB SHADOW E&M-EST. PATIENT-LVL III: ICD-10-PCS | Mod: PBBFAC,,, | Performed by: INTERNAL MEDICINE

## 2020-02-11 PROCEDURE — 3078F DIAST BP <80 MM HG: CPT | Mod: CPTII,S$GLB,, | Performed by: INTERNAL MEDICINE

## 2020-02-11 PROCEDURE — 99999 PR PBB SHADOW E&M-EST. PATIENT-LVL III: CPT | Mod: PBBFAC,,, | Performed by: INTERNAL MEDICINE

## 2020-02-11 PROCEDURE — 3074F PR MOST RECENT SYSTOLIC BLOOD PRESSURE < 130 MM HG: ICD-10-PCS | Mod: CPTII,S$GLB,, | Performed by: INTERNAL MEDICINE

## 2020-02-11 PROCEDURE — 1101F PT FALLS ASSESS-DOCD LE1/YR: CPT | Mod: CPTII,S$GLB,, | Performed by: INTERNAL MEDICINE

## 2020-02-11 PROCEDURE — 1101F PR PT FALLS ASSESS DOC 0-1 FALLS W/OUT INJ PAST YR: ICD-10-PCS | Mod: CPTII,S$GLB,, | Performed by: INTERNAL MEDICINE

## 2020-02-11 PROCEDURE — 3078F PR MOST RECENT DIASTOLIC BLOOD PRESSURE < 80 MM HG: ICD-10-PCS | Mod: CPTII,S$GLB,, | Performed by: INTERNAL MEDICINE

## 2020-02-11 PROCEDURE — 99214 PR OFFICE/OUTPT VISIT, EST, LEVL IV, 30-39 MIN: ICD-10-PCS | Mod: 25,S$GLB,, | Performed by: INTERNAL MEDICINE

## 2020-02-11 PROCEDURE — 99214 OFFICE O/P EST MOD 30 MIN: CPT | Mod: 25,S$GLB,, | Performed by: INTERNAL MEDICINE

## 2020-02-11 NOTE — PROGRESS NOTES
Subjective:      Patient ID: Adriana Strong is a 75 y.o. female.    Chief Complaint: Follow-up [facility]    HPI:  Had TAVR 11/19 by Dr Capone    Had PEDRO cardioversion by Dr Bee yesterday    Dr Cohen checked mesenteric artery stent and said it was OK.    Pt states she is not as short-winded when climbing stairs or when shopping since the TAVR    Fell in the movies climbing stairs in the dark    Review of Systems   Cardiovascular: Positive for dyspnea on exertion (mild, intermittent, attributed to asthma) and syncope (went to ER after fainting at home, blood pressure was low, pt was dehydrated from restricting fluids). Negative for chest pain, claudication, irregular heartbeat, leg swelling, near-syncope, orthopnea and palpitations.      Note after fainting episode the digoxin was discontinued and the diltiazem was reduced from 240 to 180    Past Medical History:   Diagnosis Date    Acute on chronic diastolic (congestive) heart failure 11/20/2019    Anticoagulant long-term use     on Plavix since May 2015    Anxiety     Arthritis     Asthma     Atrial fibrillation     Cataracts, bilateral     Chest pain, atypical     Colon polyp     Coronary artery disease     Diabetes mellitus     Dry eyes     Esophageal erosions     GERD (gastroesophageal reflux disease)     Heart murmur     Hemorrhoid     High cholesterol     Hypertension     Irritable bowel syndrome     Shingles 2015    Stenosis of aortic and mitral valves     TIA (transient ischemic attack)     Use of cane as ambulatory aid         Past Surgical History:   Procedure Laterality Date    ABDOMINAL SURGERY      stents to SMA    angiocele      2007, 2014    AORTIC VALVE REPLACEMENT N/A 11/19/2019    Procedure: Replacement-valve-aortic;  Surgeon: Jack Capone MD;  Location: [facility] CATH LAB;  Service: Cardiology;  Laterality: N/A;    BREAST SURGERY      left--- a lump--- no cancer     CARDIAC VALVE SURGERY      COLONOSCOPY  2014    COLONOSCOPY N/A 3/14/2018    Procedure: COLONOSCOPY;  Surgeon: Efren Kemp MD;  [facility]  Service: Endoscopy;  Laterality: N/A;  ok to hold Plavix 5 days prior to procedure per Dr RESHMA Hernandez     ok per Dr Kemp to hold Eliquis 2 days prior to procedure (see telephone encounter dated-2/7/18)    HERNIA REPAIR      HYSTERECTOMY  partial    1982 partial hysterectomy    LEFT HEART CATHETERIZATION Right 11/5/2019    Procedure: Left heart cath;  Surgeon: Jack Capone MD;  Location: [facility] CATH LAB;  Service: Cardiology;  Laterality: Right;    left nasal polyp      left neck lymph node      nose polyp      right hip fatty mass tissue      stent to small intestine      SUPERIOR VENA CAVA ANGIOPLASTY / STENTING      TONSILLECTOMY      TOTAL ABDOMINAL HYSTERECTOMY      2014    TOTAL KNEE ARTHROPLASTY Bilateral     TREATMENT OF CARDIAC ARRHYTHMIA N/A 2/10/2020    Procedure: CARDIOVERSION;  Surgeon: Jayy Parrish MD;  Location: [facility] EP LAB;  Service: Cardiology;  Laterality: N/A;  AF, PEDRO, DCCV, MAC, DM, 3 Prep    UPPER GASTROINTESTINAL ENDOSCOPY  2014       Family History   Problem Relation Age of Onset    Colon cancer Neg Hx     Inflammatory bowel disease Neg Hx        Social History     Socioeconomic History    Marital status:      Spouse name: Not on file    Number of children: Not on file    Years of education: Not on file    Highest education level: Not on file   Occupational History    Not on file   Social Needs    Financial resource strain: Not on file    Food insecurity:     Worry: Not on file     Inability: Not on file    Transportation needs:     Medical: Not on file     Non-medical: Not on file   Tobacco Use    Smoking status: Never Smoker    Smokeless tobacco: Never Used   Substance and Sexual Activity    Alcohol use: No    Drug use: No    Sexual activity: Not on file   Lifestyle    Physical activity:      Days per week: Not on file     Minutes per session: Not on file    Stress: Not on file   Relationships    Social connections:     Talks on phone: Not on file     Gets together: Not on file     Attends Voodoo service: Not on file     Active member of club or organization: Not on file     Attends meetings of clubs or organizations: Not on file     Relationship status: Not on file   Other Topics Concern    Not on file   Social History Narrative    Not on file       Current Outpatient Medications on File Prior to Visit   Medication Sig Dispense Refill    acetaminophen (TYLENOL ARTHRITIS) 650 MG TbSR Take 1,300 mg by mouth 2 (two) times daily.      albuterol (ACCUNEB) 1.25 mg/3 mL Nebu Take scheduled q4 for the next two days while at home then resume prn dosing 3 mL 1    albuterol (VENTOLIN HFA) 90 mcg/actuation inhaler Inhale 2 puffs into the lungs every 6 (six) hours as needed for Wheezing. Rescue      apixaban (ELIQUIS) 5 mg Tab Take 1 tablet (5 mg total) by mouth 2 (two) times daily. 180 tablet 3    ascorbic acid (VITAMIN C) 500 MG tablet Take 500 mg by mouth once daily.      azelastine (ASTELIN) 137 mcg nasal spray 1 spray by Nasal route 2 (two) times daily.      bisacodyl (DULCOLAX) 5 mg EC tablet Take 5 mg by mouth daily as needed for Constipation.      budesonide-formoterol 160-4.5 mcg (SYMBICORT) 160-4.5 mcg/actuation HFAA Inhale 2 puffs into the lungs every 12 (twelve) hours. Controller      calcium carbonate (OS-MICHAEL) 600 mg (1,500 mg) Tab Take 1,200 mg by mouth once daily.      cimetidine (TAGAMET) 200 MG tablet Take 300 mg by mouth.       clopidogrel (PLAVIX) 75 mg tablet Take 1 tablet (75 mg total) by mouth once daily. 90 tablet 3    cyanocobalamin (VITAMIN B-12) 1000 MCG tablet Take 100 mcg by mouth once daily.      diltiaZEM (CARDIZEM CD) 180 MG 24 hr capsule Take 1 capsule (180 mg total) by mouth once daily. 90 capsule 3    diphenhydrAMINE (BENADRYL) 25 mg capsule Take 50 mg by mouth.        DULoxetine (CYMBALTA) 20 MG capsule Take 1 capsule (20 mg total) by mouth 2 (two) times daily. 180 capsule 3    fexofenadine (ALLEGRA) 60 MG tablet Take 60 mg by mouth every morning.      fish oil-omega-3 fatty acids 300-1,000 mg capsule Take 1 g by mouth once daily.       fluconazole (DIFLUCAN) 150 MG Tab TAKE 1 TABLET (150 MG TOTAL) BY MOUTH ONCE. FOR 1 DOSE  0    fluticasone (FLONASE) 50 mcg/actuation nasal spray 1 spray by Each Nare route every morning.       furosemide (LASIX) 40 MG tablet TAKE 1 TABLET BY MOUTH EVERY DAY 90 tablet 3    gabapentin (NEURONTIN) 300 MG capsule Take 2 capsules (600 mg total) by mouth 3 (three) times daily. 540 capsule 3    isosorbide mononitrate (IMDUR) 30 MG 24 hr tablet Take 1 tablet (30 mg total) by mouth once daily. 30 tablet 11    lisinopril (PRINIVIL,ZESTRIL) 20 MG tablet Take 1 tablet (20 mg total) by mouth 2 (two) times daily. 180 tablet 3    lorazepam (ATIVAN) 0.5 MG tablet Take 0.5 mg by mouth every morning.       magnesium 250 mg Tab Take 250 mg by mouth. 3 in am and 3 pm      metFORMIN (GLUCOPHAGE) 500 MG tablet Take 500 mg by mouth 2 (two) times daily.  1    nitroGLYCERIN (NITROSTAT) 0.4 MG SL tablet Place 1 tablet (0.4 mg total) under the tongue every 5 (five) minutes as needed for Chest pain. 30 tablet 11    nystatin (MYCOSTATIN) powder Apply topically 4 (four) times daily. 1 Bottle 0    ondansetron (ZOFRAN-ODT) 4 MG TbDL DISSOLVE 1 TABLET ON TONGUE EVERY 8 HOURS AS NEEDED FOR NAUSEA  1    oxybutynin (DITROPAN-XL) 5 MG TR24 Take 5 mg by mouth every other day.       pantoprazole (PROTONIX) 40 MG tablet Take 1 tablet (40 mg total) by mouth once daily. (Patient taking differently: Take 40 mg by mouth every morning. ) 90 tablet 3    POLYSORBATE 80/GLYCERIN (REFRESH DRY EYE THERAPY OPHT) Apply to eye 2 (two) times daily.      potassium gluconate 550 mg (90 mg) Tab Take by mouth. 2 in am and 2 pm      traMADol (ULTRAM) 50 mg tablet Take 1 tablet  "(50 mg total) by mouth every 6 (six) hours as needed for Pain. 20 tablet 0    zafirlukast (ACCOLATE) 20 MG tablet Take 20 mg by mouth once daily.  3    famotidine (PEPCID) 20 MG tablet Take 1 tablet (20 mg total) by mouth every 6 (six) hours. for 3 doses 3 tablet 0    [DISCONTINUED] predniSONE (DELTASONE) 50 MG Tab Take by mouth.        Current Facility-Administered Medications on File Prior to Visit   Medication Dose Route Frequency Provider Last Rate Last Dose    0.9%  NaCl infusion   Intravenous Continuous Lynette Hightower NP        sodium chloride 0.9% flush 5 mL  5 mL Intravenous PRN Lynette Hightower NP        [DISCONTINUED] fentaNYL injection 25 mcg  25 mcg Intravenous Q5 Min PRN Anthony Izquierdo MD        [DISCONTINUED] ondansetron injection 4 mg  4 mg Intravenous Once PRN Anthony Izquierdo MD        [DISCONTINUED] promethazine (PHENERGAN) 6.25 mg in dextrose 5 % 50 mL IVPB  6.25 mg Intravenous Q10 Min PRN Anthony Izquierdo MD        [DISCONTINUED] sodium chloride 0.9% flush 2 mL  2 mL Intravenous PRN Anthony Izquierdo MD           Review of patient's allergies indicates:   Allergen Reactions    Celebrex [celecoxib] Shortness Of Breath    Ciprofloxacin Swelling     lip    Dicyclomine Hives    Adhesive Dermatitis    Avelox [moxifloxacin] Swelling    Cilostazol Other (See Comments)     Elevates blood pressure    Eryc [erythromycin] Other (See Comments)     unknown    Iodine and iodide containing products Hives    Keflex [cephalexin] Hives    Meclomen      rashes    Meloxicam      Ear ringing    Metoclopramide Other (See Comments)     High blood pressure    Sulfa (sulfonamide antibiotics) Itching     Objective:     Vitals:    02/11/20 1357   BP: 122/70   BP Location: Left arm   Patient Position: Sitting   BP Method: Large (Automatic)   Pulse: (!) 127   SpO2: 98%   Weight: 105.3 kg (232 lb 2.3 oz)   Height: 5' 4" (1.626 m)        Physical Exam   Constitutional: She is " oriented to person, place, and time. She appears well-developed and well-nourished.   Eyes: No scleral icterus.   Neck: No JVD present. Carotid bruit is not present.   Cardiovascular: Normal rate. An irregular rhythm present. Exam reveals no gallop.   No murmur heard.  Pulmonary/Chest: She has wheezes (few scattered wheezes bilaterally).   Musculoskeletal: She exhibits edema (trace pitting pedal edema bilaterally).   Neurological: She is alert and oriented to person, place, and time.   Skin: Skin is warm and dry.   Psychiatric: She has a normal mood and affect. Her behavior is normal. Judgment and thought content normal.   Vitals reviewed.     Wt up 4 lbs    ECG NSR with frequent runs of ectopic atrial tachycardia.    Lab from Dr Go:  Chol 197  HDL 61      Glu 118  HgbA1C 6.5  CMP WNL  TSH 1.83  CBC WNL  Assessment:     1. S/P TAVR (transcatheter aortic valve replacement)    2. Persistent atrial fibrillation    3. Pure hypercholesterolemia    4. Essential hypertension    5. Warfarin anticoagulation    6. Controlled type 2 diabetes mellitus with diabetic polyneuropathy, without long-term current use of insulin    7. Morbid obesity    8. Gastroesophageal reflux disease, esophagitis presence not specified    9. Mesenteric artery insufficiency    10. Old lacunar stroke without late effect    11. Diabetic polyneuropathy associated with type 2 diabetes mellitus    12. Mild intermittent asthma without complication      Plan:   Adriana was seen today for follow-up.    Diagnoses and all orders for this visit:    S/P TAVR (transcatheter aortic valve replacement)    Persistent atrial fibrillation  -     IN OFFICE EKG 12-LEAD (to Muse)    Pure hypercholesterolemia    Essential hypertension    Warfarin anticoagulation    Controlled type 2 diabetes mellitus with diabetic polyneuropathy, without long-term current use of insulin    Morbid obesity    Gastroesophageal reflux disease, esophagitis presence not  specified    Mesenteric artery insufficiency    Old lacunar stroke without late effect    Diabetic polyneuropathy associated with type 2 diabetes mellitus    Mild intermittent asthma without complication     30 day event monitor in progress.  Pt has f/u appt with DR Hany Bee    Same meds    ADA weight reducing diet discussed    Follow up in about 4 months (around 6/11/2020).

## 2020-02-11 NOTE — DISCHARGE SUMMARY
"[facility] Cardiac Unit  Cardiac Electrophysiology  Discharge Summary      Patient Name: Adriana Strong  MRN: [id number]  Admission Date: 2/10/2020  Hospital Length of Stay: 0 days  Discharge Date and Time: 2/10/2020  1:11 PM  Attending Physician: Hany Bee MD  Discharging Provider: Lynette Hightower NP  Primary Care Physician: Krzysztof Mcgraw MD    HPI: Ms. Strong is a 75 y.o. F with a PMHx of severe AS, s/p TAVR 4/2019, syncope prior to TAVR, CVA/TIA, AF, without hx of DCCV ever, DM2 on meds, HFpEF on meds, HTN on meds.     Had TAVR 11/2019. No change to QRS, and no heart block.  1 week later: Suffered vagal-sounding syncope (no injury, confusion/washed out for hours afterward, "dehydration" dx at ER).   Sent home with MCOT. This shows AF.     10/19 echo 60%. MISTY 0.8 cm2     ECG at last clinic visit showed AF at 103 bpm  Continue OAC.  Plan for PEDRO/DCCV and 30-day monitor in near future. If feels better in NSR, will endeavor to maintain NSR -- which may be difficult, including requiring AAD (e.g., amio)..    She presents today for PEDRO/DCCV with Dr. Bee.    Procedure(s) (LRB):  CARDIOVERSION (N/A)  Transesophageal echo (PEDRO) intra-procedure log documentation (N/A)     Indwelling Lines/Drains at time of discharge:  Lines/Drains/Airways     None           Hospital Course: Patient presented in atrial fibrillation. Patient on Eliquis. PEDRO completed, negative for FATIMAH thrombus. DCCV completed x 3 with ERAF (see procedure report). When patient arrived in EP PACU restoration of normal sinus rhythm was noted on telemetry. EKG x 2 confirmed this. Patient tolerated procedure well with no acute complications. Post procedure EKG showed normal sinus rhythm with 1st degree AV block with a ventricular rate of 83 bpm. . QRS 82. QTc 448. Patient voiding, tolerating PO intake, and ambulating without any difficulty. Reviewed EKG and discharge plans with Dr. Bee. Plan to continue home all home " medications. Patient to be discharged with 30 day cardiac monitor. EKG 1 week. Follow up with Dr. Bee in 1 month, Discharge plans/instructions discussed with patient and family who verbalized understanding and agreement of plans of care. No further questions or concerns voiced at this time. Discharged home in stable condition. VSS.    Physical Exam   Constitutional: She is oriented to person, place, and time. She appears well-developed and well-nourished.   HENT:   Head: Normocephalic and atraumatic.   Eyes: Conjunctivae, EOM and lids are normal. No scleral icterus.   Neck: Normal range of motion. No JVD present. No tracheal deviation present.   Cardiovascular: Normal rate and intact distal pulses. Regular rhythm present. No extrasystoles are present. PMI is not displaced. Exam reveals no gallop and no friction rub.   Pulses:       Radial pulses are 2+ on the right side, and 2+ on the left side.   Pulmonary/Chest: Effort normal and breath sounds normal. No accessory muscle usage. No tachypnea. No respiratory distress. She has no wheezes. She has no rales.   Abdominal: Soft. Bowel sounds are normal. She exhibits no distension. There is no tenderness.   Musculoskeletal: Normal range of motion.   Neurological: She is alert and oriented to person, place, and time. She has normal reflexes. She exhibits normal muscle tone.   Skin: Skin is warm and dry. No rash noted.   Psychiatric: She has a normal mood and affect. Her behavior is normal.   Nursing note and vitals reviewed.    Consults:   Anesthesia.    Significant Diagnostic Studies: Labs:     Final Active Diagnoses:    Diagnosis Date Noted POA    PRINCIPAL PROBLEM:  Persistent atrial fibrillation [I48.19] 02/10/2020 Yes    Atrial fibrillation, chronic [I48.20] 06/23/2017 Yes      Problems Resolved During this Admission:     No new Assessment & Plan notes have been filed under this hospital service since the last note was generated.  Service: Arrhythmia    Discharged  Condition: stable    Disposition: Home or Self Care    Follow Up:  Follow-up Information     EKG 1 [facility] In 1 week.    Specialty:  Cardiology  Why:  s/p DCCV           Hany Bee MD In 1 month.    Specialties:  Electrophysiology, Cardiology  Why:  s/p DCCV and 30 day cardiac monitor  Contact information:  [address]  [facility] [address]  [phone]                 Patient Instructions:      Diet Cardiac     No driving until:   Order Comments: No driving or operating heavy machinery for 24-48 hours after your procedure because you received sedation.     Other restrictions (specify):   Order Comments: Medications:  -Continue to take your home medications as listed on your medication list after you are discharged.  -If you have problems or side effects caused by your medications, call your Physician.    New Medications:  None.    Diet  -You may resume oral intake after you are discharged, as long you have no swallowing difficulties.    Side effects:  -You may be drowsy for the remainder of the day because you received sedation.    Because you have received sedation for this procedure:  -Limit activity for the remainder of the day.  -Do not drive or operate any equipment for the remainder of the day.  -Do not smoke for at least 6 hours and until you are fully awake and alert.  -Do not drink alcoholic beverage for 24 hours.  -Defer important decision making until the following day.    Go to the Emergency Department if you develop:  -Bleeding  -Weakness or numbness  -Visual, gait or speech disturbance  -New chest pain, palpitations, shortness of breath, rapid heart beat, or fainting  -Fever    Follow up:  -You will be sent home with a 30 day cardiac monitor.  -EKG in 1 week.  -Dr. Bee in 1 month.     Notify your health care provider if you experience any of the following:   Order Comments: For any concerning medical symptoms.     Notify your health care provider if you experience any of the following:   increased confusion or weakness     Notify your health care provider if you experience any of the following:  persistent dizziness, light-headedness, or visual disturbances     Notify your health care provider if you experience any of the following:  worsening rash     Notify your health care provider if you experience any of the following:  severe persistent headache     Notify your health care provider if you experience any of the following:  difficulty breathing or increased cough     Notify your health care provider if you experience any of the following:  redness, tenderness, or signs of infection (pain, swelling, redness, odor or green/yellow discharge around incision site)     Notify your health care provider if you experience any of the following:  severe uncontrolled pain     Notify your health care provider if you experience any of the following:  temperature >100.4     Notify your health care provider if you experience any of the following:  persistent nausea and vomiting or diarrhea     Medications:  Reconciled Home Medications:      Medication List      CHANGE how you take these medications    pantoprazole 40 MG tablet  Commonly known as:  Protonix  Take 1 tablet (40 mg total) by mouth once daily.  What changed:  when to take this        CONTINUE taking these medications    * Ventolin HFA 90 mcg/actuation inhaler  Generic drug:  albuterol  Inhale 2 puffs into the lungs every 6 (six) hours as needed for Wheezing. Rescue     * albuterol 1.25 mg/3 mL Nebu  Commonly known as:  ACCUNEB  Take scheduled q4 for the next two days while at home then resume prn dosing     apixaban 5 mg Tab  Commonly known as:  ELIQUIS  Take 1 tablet (5 mg total) by mouth 2 (two) times daily.     azelastine 137 mcg (0.1 %) nasal spray  Commonly known as:  ASTELIN  1 spray by Nasal route 2 (two) times daily.     bisacodyL 5 mg EC tablet  Commonly known as:  DULCOLAX  Take 5 mg by mouth daily as needed for Constipation.     calcium  carbonate 600 mg calcium (1,500 mg) Tab  Commonly known as:  OS-MICHAEL  Take 1,200 mg by mouth once daily.     cimetidine 200 MG tablet  Commonly known as:  TAGAMET  Take 300 mg by mouth.     clopidogreL 75 mg tablet  Commonly known as:  PLAVIX  Take 1 tablet (75 mg total) by mouth once daily.     diltiaZEM 180 MG 24 hr capsule  Commonly known as:  CARDIZEM CD  Take 1 capsule (180 mg total) by mouth once daily.     diphenhydrAMINE 25 mg capsule  Commonly known as:  BENADRYL  Take 50 mg by mouth.     DULoxetine 20 MG capsule  Commonly known as:  CYMBALTA  Take 1 capsule (20 mg total) by mouth 2 (two) times daily.     fexofenadine 60 MG tablet  Commonly known as:  ALLEGRA  Take 60 mg by mouth every morning.     fish oil-omega-3 fatty acids 300-1,000 mg capsule  Take 1 g by mouth once daily.     fluconazole 150 MG Tab  Commonly known as:  DIFLUCAN  TAKE 1 TABLET (150 MG TOTAL) BY MOUTH ONCE. FOR 1 DOSE     fluticasone propionate 50 mcg/actuation nasal spray  Commonly known as:  FLONASE  1 spray by Each Nare route every morning.     furosemide 40 MG tablet  Commonly known as:  LASIX  TAKE 1 TABLET BY MOUTH EVERY DAY     gabapentin 300 MG capsule  Commonly known as:  NEURONTIN  Take 2 capsules (600 mg total) by mouth 3 (three) times daily.     isosorbide mononitrate 30 MG 24 hr tablet  Commonly known as:  IMDUR  Take 1 tablet (30 mg total) by mouth once daily.     lisinopril 20 MG tablet  Commonly known as:  PRINIVIL,ZESTRIL  Take 1 tablet (20 mg total) by mouth 2 (two) times daily.     LORazepam 0.5 MG tablet  Commonly known as:  ATIVAN  Take 0.5 mg by mouth every morning.     magnesium 250 mg Tab  Take 250 mg by mouth. 3 in am and 3 pm     metFORMIN 500 MG tablet  Commonly known as:  GLUCOPHAGE  Take 500 mg by mouth 2 (two) times daily.     nitroGLYCERIN 0.4 MG SL tablet  Commonly known as:  NITROSTAT  Place 1 tablet (0.4 mg total) under the tongue every 5 (five) minutes as needed for Chest pain.     nystatin  powder  Commonly known as:  MYCOSTATIN  Apply topically 4 (four) times daily.     ondansetron 4 MG Tbdl  Commonly known as:  ZOFRAN-ODT  DISSOLVE 1 TABLET ON TONGUE EVERY 8 HOURS AS NEEDED FOR NAUSEA     oxybutynin 5 MG Tr24  Commonly known as:  DITROPAN-XL  Take 5 mg by mouth every other day.     potassium gluconate 550 mg (90 mg) Tab  Take by mouth. 2 in am and 2 pm     predniSONE 50 MG Tab  Commonly known as:  DELTASONE  Take by mouth.     REFRESH DRY EYE THERAPY OPHT  Apply to eye 2 (two) times daily.     Symbicort 160-4.5 mcg/actuation Hfaa  Generic drug:  budesonide-formoterol 160-4.5 mcg  Inhale 2 puffs into the lungs every 12 (twelve) hours. Controller     traMADol 50 mg tablet  Commonly known as:  ULTRAM  Take 1 tablet (50 mg total) by mouth every 6 (six) hours as needed for Pain.     Tylenol Arthritis 650 MG Tbsr  Generic drug:  acetaminophen  Take 1,300 mg by mouth 2 (two) times daily.     Vitamin B-12 1000 MCG tablet  Generic drug:  cyanocobalamin  Take 100 mcg by mouth once daily.     Vitamin C 500 MG tablet  Generic drug:  ascorbic acid (vitamin C)  Take 500 mg by mouth once daily.     zafirlukast 20 MG tablet  Commonly known as:  ACCOLATE  Take 20 mg by mouth once daily.         * This list has 2 medication(s) that are the same as other medications prescribed for you. Read the directions carefully, and ask your doctor or other care provider to review them with you.            ASK your doctor about these medications    famotidine 20 MG tablet  Commonly known as:  Pepcid  Take 1 tablet (20 mg total) by mouth every 6 (six) hours. for 3 doses          Plan:  -Continue all home medications.  -Sent home with 30 day cardiac monitor.  -Follow up EKG in 1 week.  -Follow up with Dr. Bee in 1 month.    Time spent on the discharge of patient: 15 minutes    Lynette Hightower NP  Cardiac Electrophysiology  [facility]    Attending: Hany Bee MD

## 2020-02-13 ENCOUNTER — PATIENT MESSAGE (OUTPATIENT)
Dept: ELECTROPHYSIOLOGY | Facility: CLINIC | Age: 75
End: 2020-02-13

## 2020-02-17 ENCOUNTER — HOSPITAL ENCOUNTER (OUTPATIENT)
Dept: CARDIOLOGY | Facility: CLINIC | Age: 75
Discharge: HOME | End: 2020-02-17
Payer: MEDICARE

## 2020-02-17 DIAGNOSIS — I49.8 OTHER SPECIFIED CARDIAC ARRHYTHMIAS: ICD-10-CM

## 2020-02-17 PROCEDURE — 93005 RHYTHM STRIP: ICD-10-PCS | Mod: S$GLB,,, | Performed by: INTERNAL MEDICINE

## 2020-02-17 PROCEDURE — 93010 ELECTROCARDIOGRAM REPORT: CPT | Mod: S$GLB,,, | Performed by: INTERNAL MEDICINE

## 2020-02-17 PROCEDURE — 93010 RHYTHM STRIP: ICD-10-PCS | Mod: S$GLB,,, | Performed by: INTERNAL MEDICINE

## 2020-02-17 PROCEDURE — 93005 ELECTROCARDIOGRAM TRACING: CPT | Mod: S$GLB,,, | Performed by: INTERNAL MEDICINE

## 2020-03-21 ENCOUNTER — PATIENT MESSAGE (OUTPATIENT)
Dept: CARDIOLOGY | Facility: CLINIC | Age: 75
End: 2020-03-21

## 2020-04-06 ENCOUNTER — TELEPHONE (OUTPATIENT)
Dept: ELECTROPHYSIOLOGY | Facility: CLINIC | Age: 75
End: 2020-04-06

## 2020-04-06 NOTE — TELEPHONE ENCOUNTER
Spoke w/ pt & informed her of cancelled appts. Offered telemed but pt stated she does not have the equipment for virtual visit. Assured pt that I would give her a call back to get appt r/s. Pt wanted to know results of 30 day monitor. Informed her that a nurse would give her a call back.

## 2020-04-07 ENCOUNTER — TELEPHONE (OUTPATIENT)
Dept: ELECTROPHYSIOLOGY | Facility: CLINIC | Age: 75
End: 2020-04-07

## 2020-04-07 NOTE — TELEPHONE ENCOUNTER
Spoke with Ms. Tae and relayed the results of her Holter monitor. She said that she feels ok and will defer a virtual visit at this time. I advised her to call us if anything changes. Patient verbalized understanding and appreciated call.

## 2020-04-07 NOTE — TELEPHONE ENCOUNTER
----- Message from Hany Bee MD sent at 4/7/2020 11:05 AM CDT -----  There was no AF, but there were some episodes of another fast rhythm called atrial tachycardia. No symptom was reported.  Encourage telemedicine visit. Otherwise, follow-up after COVID.  thanks  DPM    ----- Message -----  From: Jessica Montoya RN  Sent: 4/6/2020   3:52 PM CDT  To: MD Dr. Cristal Martins,     Can you take a look at her Holter results and let me know?    Thanks,   Jessica   ----- Message -----  From: Moncho Phan MA  Sent: 4/6/2020   3:44 PM CDT  To: Mirna Quinonez RN    Tried to schedule pt for a telemed but she decline and wants to know 30 day results. Can someone please assist

## 2020-05-13 ENCOUNTER — TELEPHONE (OUTPATIENT)
Dept: ELECTROPHYSIOLOGY | Facility: CLINIC | Age: 75
End: 2020-05-13

## 2020-05-13 ENCOUNTER — PATIENT MESSAGE (OUTPATIENT)
Dept: ELECTROPHYSIOLOGY | Facility: CLINIC | Age: 75
End: 2020-05-13

## 2020-05-13 ENCOUNTER — CLINICAL SUPPORT (OUTPATIENT)
Dept: LAB | Facility: HOSPITAL | Age: 75
End: 2020-05-13
Attending: INTERNAL MEDICINE
Payer: MEDICARE

## 2020-05-13 DIAGNOSIS — I48.4 ATYPICAL ATRIAL FLUTTER: Primary | ICD-10-CM

## 2020-05-13 DIAGNOSIS — I49.8 OTHER SPECIFIED CARDIAC ARRHYTHMIAS: ICD-10-CM

## 2020-05-13 DIAGNOSIS — I48.19 PERSISTENT ATRIAL FIBRILLATION: ICD-10-CM

## 2020-05-13 PROCEDURE — 93005 ELECTROCARDIOGRAM TRACING: CPT

## 2020-05-13 NOTE — TELEPHONE ENCOUNTER
----- Message from Moncho Phan MA sent at 5/13/2020  8:29 AM CDT -----  Contact: Patient      ----- Message -----  From: Scarlett Vaca  Sent: 5/13/2020   8:20 AM CDT  To: Cristal ESTEVEZ Staff    The pt is calling to report that her BP is 179/131 p89. and she feels she is in AFIB again , and wants to be see today. Please call her back @ [phone]. Thanks, Scarlett

## 2020-05-13 NOTE — TELEPHONE ENCOUNTER
Good morning,       Mrs. Strong called this morning and stated that she was having palpitations and shortness of breath on exertion. She is compliant with Eliquis. Pt was controlled on Cardizem up until now. Sent pt for EKG at [facility] this morning. I put a copy on your desk and see below. Please advise.

## 2020-05-14 ENCOUNTER — TELEPHONE (OUTPATIENT)
Dept: ELECTROPHYSIOLOGY | Facility: CLINIC | Age: 75
End: 2020-05-14

## 2020-05-14 ENCOUNTER — LAB VISIT (OUTPATIENT)
Dept: FAMILY MEDICINE | Facility: CLINIC | Age: 75
End: 2020-05-14
Payer: MEDICARE

## 2020-05-14 DIAGNOSIS — I48.4 ATYPICAL ATRIAL FLUTTER: ICD-10-CM

## 2020-05-14 PROCEDURE — U0002 COVID-19 LAB TEST NON-CDC: HCPCS

## 2020-05-14 NOTE — TELEPHONE ENCOUNTER
Spoke to patient    CONFIRMED procedure arrival time of 7:00 AM     Reiterated instructions including:  -Directions to check in desk  -NPO after midnight night prior to procedure  -High importance of HOLDING Metformin the morning of procedure   -Confirmed compliance of Eliquis  -Explained to pt that COVID test was not resulted and pt may need rapid test on admission if not resulted due to pt being an add on case. Also notified [facility]  -Confirmed no recent fever, bleeding, infection or skin rash in the past 30 days  --Do not wear mascara day of procedure  -Bathe night prior and morning prior to procedure with Hibiclens solution or an antibacterial soap     Pt verbalizes understanding of above and appreciates call.

## 2020-05-15 ENCOUNTER — ANESTHESIA EVENT (OUTPATIENT)
Dept: MEDSURG UNIT | Facility: HOSPITAL | Age: 75
End: 2020-05-15
Payer: MEDICARE

## 2020-05-15 ENCOUNTER — HOSPITAL ENCOUNTER (OUTPATIENT)
Facility: HOSPITAL | Age: 75
Discharge: HOME | End: 2020-05-15
Attending: INTERNAL MEDICINE | Admitting: INTERNAL MEDICINE
Payer: MEDICARE

## 2020-05-15 ENCOUNTER — HOSPITAL ENCOUNTER (OUTPATIENT)
Dept: CARDIOLOGY | Facility: CLINIC | Age: 75
Discharge: HOME | End: 2020-05-15
Attending: INTERNAL MEDICINE
Payer: MEDICARE

## 2020-05-15 ENCOUNTER — ANESTHESIA (OUTPATIENT)
Dept: MEDSURG UNIT | Facility: HOSPITAL | Age: 75
End: 2020-05-15
Payer: MEDICARE

## 2020-05-15 VITALS
WEIGHT: 226 LBS | BODY MASS INDEX: 38.58 KG/M2 | DIASTOLIC BLOOD PRESSURE: 65 MMHG | SYSTOLIC BLOOD PRESSURE: 136 MMHG | HEIGHT: 64 IN

## 2020-05-15 VITALS
OXYGEN SATURATION: 98 % | TEMPERATURE: 97 F | WEIGHT: 226 LBS | RESPIRATION RATE: 20 BRPM | SYSTOLIC BLOOD PRESSURE: 127 MMHG | HEIGHT: 64 IN | BODY MASS INDEX: 38.58 KG/M2 | DIASTOLIC BLOOD PRESSURE: 58 MMHG | HEART RATE: 85 BPM

## 2020-05-15 DIAGNOSIS — I48.4 ATYPICAL ATRIAL FLUTTER: ICD-10-CM

## 2020-05-15 DIAGNOSIS — I48.19 PERSISTENT ATRIAL FIBRILLATION: ICD-10-CM

## 2020-05-15 DIAGNOSIS — I48.19 PERSISTENT ATRIAL FIBRILLATION: Primary | ICD-10-CM

## 2020-05-15 DIAGNOSIS — I48.91 ATRIAL FIBRILLATION: ICD-10-CM

## 2020-05-15 PROBLEM — I48.92 ATRIAL FLUTTER: Status: ACTIVE | Noted: 2020-05-15

## 2020-05-15 LAB
ALBUMIN SERPL BCP-MCNC: 3.7 G/DL (ref 3.5–5.2)
ALP SERPL-CCNC: 50 U/L (ref 55–135)
ALT SERPL W/O P-5'-P-CCNC: 28 U/L (ref 10–44)
ANION GAP SERPL CALC-SCNC: 12 MMOL/L (ref 8–16)
AST SERPL-CCNC: 38 U/L (ref 10–40)
BASOPHILS # BLD AUTO: 0.05 K/UL (ref 0–0.2)
BASOPHILS NFR BLD: 0.5 % (ref 0–1.9)
BILIRUB DIRECT SERPL-MCNC: 0.2 MG/DL (ref 0.1–0.3)
BILIRUB SERPL-MCNC: 0.5 MG/DL (ref 0.1–1)
BSA FOR ECHO PROCEDURE: 2.15 M2
BUN SERPL-MCNC: 16 MG/DL (ref 8–23)
CALCIUM SERPL-MCNC: 9.3 MG/DL (ref 8.7–10.5)
CHLORIDE SERPL-SCNC: 101 MMOL/L (ref 95–110)
CO2 SERPL-SCNC: 27 MMOL/L (ref 23–29)
CREAT SERPL-MCNC: 0.8 MG/DL (ref 0.5–1.4)
DIFFERENTIAL METHOD: ABNORMAL
EOSINOPHIL # BLD AUTO: 0 K/UL (ref 0–0.5)
EOSINOPHIL NFR BLD: 0.4 % (ref 0–8)
ERYTHROCYTE [DISTWIDTH] IN BLOOD BY AUTOMATED COUNT: 14.5 % (ref 11.5–14.5)
EST. GFR  (AFRICAN AMERICAN): >60 ML/MIN/1.73 M^2
EST. GFR  (NON AFRICAN AMERICAN): >60 ML/MIN/1.73 M^2
GLUCOSE SERPL-MCNC: 135 MG/DL (ref 70–110)
HCT VFR BLD AUTO: 39.8 % (ref 37–48.5)
HGB BLD-MCNC: 12.8 G/DL (ref 12–16)
IMM GRANULOCYTES # BLD AUTO: 0.02 K/UL (ref 0–0.04)
IMM GRANULOCYTES NFR BLD AUTO: 0.2 % (ref 0–0.5)
INR PPP: 1.1 (ref 0.8–1.2)
LYMPHOCYTES # BLD AUTO: 4 K/UL (ref 1–4.8)
LYMPHOCYTES NFR BLD: 40.4 % (ref 18–48)
MCH RBC QN AUTO: 31.3 PG (ref 27–31)
MCHC RBC AUTO-ENTMCNC: 32.2 G/DL (ref 32–36)
MCV RBC AUTO: 97 FL (ref 82–98)
MONOCYTES # BLD AUTO: 0.8 K/UL (ref 0.3–1)
MONOCYTES NFR BLD: 8.6 % (ref 4–15)
NEUTROPHILS # BLD AUTO: 4.9 K/UL (ref 1.8–7.7)
NEUTROPHILS NFR BLD: 49.9 % (ref 38–73)
NRBC BLD-RTO: 0 /100 WBC
PLATELET # BLD AUTO: 265 K/UL (ref 150–350)
PMV BLD AUTO: 10.4 FL (ref 9.2–12.9)
POCT GLUCOSE: 117 MG/DL (ref 70–110)
POCT GLUCOSE: 134 MG/DL (ref 70–110)
POTASSIUM SERPL-SCNC: 4.3 MMOL/L (ref 3.5–5.1)
PROT SERPL-MCNC: 7.7 G/DL (ref 6–8.4)
PROTHROMBIN TIME: 11 SEC (ref 9–12.5)
RBC # BLD AUTO: 4.09 M/UL (ref 4–5.4)
SARS-COV-2 RNA RESP QL NAA+PROBE: NOT DETECTED
SODIUM SERPL-SCNC: 140 MMOL/L (ref 136–145)
WBC # BLD AUTO: 9.78 K/UL (ref 3.9–12.7)

## 2020-05-15 PROCEDURE — 93312 TRANSESOPHAGEAL ECHO (TEE) (CUPID ONLY): ICD-10-PCS | Mod: 26,,, | Performed by: INTERNAL MEDICINE

## 2020-05-15 PROCEDURE — 93010 EKG 12-LEAD: ICD-10-PCS | Mod: ,,, | Performed by: INTERNAL MEDICINE

## 2020-05-15 PROCEDURE — 93312 ECHO TRANSESOPHAGEAL: CPT | Mod: 26,,, | Performed by: INTERNAL MEDICINE

## 2020-05-15 PROCEDURE — 93325 DOPPLER ECHO COLOR FLOW MAPG: CPT | Mod: 26,,, | Performed by: INTERNAL MEDICINE

## 2020-05-15 PROCEDURE — 92960 CARDIOVERSION ELECTRIC EXT: CPT | Mod: ,,, | Performed by: INTERNAL MEDICINE

## 2020-05-15 PROCEDURE — 93325 TRANSESOPHAGEAL ECHO (TEE) (CUPID ONLY): ICD-10-PCS | Mod: 26,,, | Performed by: INTERNAL MEDICINE

## 2020-05-15 PROCEDURE — 93325 DOPPLER ECHO COLOR FLOW MAPG: CPT

## 2020-05-15 PROCEDURE — 93320 TRANSESOPHAGEAL ECHO (TEE) (CUPID ONLY): ICD-10-PCS | Mod: 26,,, | Performed by: INTERNAL MEDICINE

## 2020-05-15 PROCEDURE — 94761 N-INVAS EAR/PLS OXIMETRY MLT: CPT

## 2020-05-15 PROCEDURE — 92960 PR CARDIOVERSION, ELECTIVE;EXTERN: ICD-10-PCS | Mod: ,,, | Performed by: INTERNAL MEDICINE

## 2020-05-15 PROCEDURE — D9220A PRA ANESTHESIA: Mod: ANES,,, | Performed by: ANESTHESIOLOGY

## 2020-05-15 PROCEDURE — D9220A PRA ANESTHESIA: ICD-10-PCS | Mod: CRNA,,, | Performed by: NURSE ANESTHETIST, CERTIFIED REGISTERED

## 2020-05-15 PROCEDURE — 93010 ELECTROCARDIOGRAM REPORT: CPT | Mod: 76,,, | Performed by: INTERNAL MEDICINE

## 2020-05-15 PROCEDURE — 85025 COMPLETE CBC W/AUTO DIFF WBC: CPT

## 2020-05-15 PROCEDURE — 63600175 PHARM REV CODE 636 W HCPCS: Performed by: NURSE ANESTHETIST, CERTIFIED REGISTERED

## 2020-05-15 PROCEDURE — 37000009 HC ANESTHESIA EA ADD 15 MINS: Performed by: INTERNAL MEDICINE

## 2020-05-15 PROCEDURE — 25000003 PHARM REV CODE 250: Performed by: NURSE ANESTHETIST, CERTIFIED REGISTERED

## 2020-05-15 PROCEDURE — 82962 GLUCOSE BLOOD TEST: CPT | Performed by: INTERNAL MEDICINE

## 2020-05-15 PROCEDURE — 37000008 HC ANESTHESIA 1ST 15 MINUTES: Performed by: INTERNAL MEDICINE

## 2020-05-15 PROCEDURE — 82962 GLUCOSE BLOOD TEST: CPT | Mod: 91 | Performed by: INTERNAL MEDICINE

## 2020-05-15 PROCEDURE — D9220A PRA ANESTHESIA: Mod: CRNA,,, | Performed by: NURSE ANESTHETIST, CERTIFIED REGISTERED

## 2020-05-15 PROCEDURE — 80048 BASIC METABOLIC PNL TOTAL CA: CPT

## 2020-05-15 PROCEDURE — 93005 ELECTROCARDIOGRAM TRACING: CPT | Mod: 59

## 2020-05-15 PROCEDURE — 92960 CARDIOVERSION ELECTRIC EXT: CPT | Performed by: INTERNAL MEDICINE

## 2020-05-15 PROCEDURE — 93005 ELECTROCARDIOGRAM TRACING: CPT

## 2020-05-15 PROCEDURE — 85610 PROTHROMBIN TIME: CPT

## 2020-05-15 PROCEDURE — 93320 DOPPLER ECHO COMPLETE: CPT | Mod: 26,,, | Performed by: INTERNAL MEDICINE

## 2020-05-15 PROCEDURE — 80076 HEPATIC FUNCTION PANEL: CPT

## 2020-05-15 PROCEDURE — D9220A PRA ANESTHESIA: ICD-10-PCS | Mod: ANES,,, | Performed by: ANESTHESIOLOGY

## 2020-05-15 RX ORDER — AMIODARONE HYDROCHLORIDE 200 MG/1
TABLET ORAL
Qty: 90 TABLET | Refills: 5 | Status: SHIPPED | OUTPATIENT
Start: 2020-05-15 | End: 2020-09-01

## 2020-05-15 RX ORDER — PROPOFOL 10 MG/ML
VIAL (ML) INTRAVENOUS
Status: DISCONTINUED | OUTPATIENT
Start: 2020-05-15 | End: 2020-05-15

## 2020-05-15 RX ORDER — KETAMINE HCL IN 0.9 % NACL 50 MG/5 ML
SYRINGE (ML) INTRAVENOUS
Status: DISCONTINUED | OUTPATIENT
Start: 2020-05-15 | End: 2020-05-15

## 2020-05-15 RX ORDER — LIDOCAINE HYDROCHLORIDE 20 MG/ML
INJECTION INTRAVENOUS
Status: DISCONTINUED | OUTPATIENT
Start: 2020-05-15 | End: 2020-05-15

## 2020-05-15 RX ORDER — SODIUM CHLORIDE 0.9 % (FLUSH) 0.9 %
3 SYRINGE (ML) INJECTION
Status: DISCONTINUED | OUTPATIENT
Start: 2020-05-15 | End: 2020-05-15 | Stop reason: HOSPADM

## 2020-05-15 RX ORDER — AMIODARONE HYDROCHLORIDE 200 MG/1
TABLET ORAL
Qty: 90 TABLET | Refills: 5 | Status: SHIPPED | OUTPATIENT
Start: 2020-05-15 | End: 2020-05-15 | Stop reason: SDUPTHER

## 2020-05-15 RX ORDER — HYDROMORPHONE HYDROCHLORIDE 1 MG/ML
0.2 INJECTION, SOLUTION INTRAMUSCULAR; INTRAVENOUS; SUBCUTANEOUS EVERY 5 MIN PRN
Status: DISCONTINUED | OUTPATIENT
Start: 2020-05-15 | End: 2020-05-15 | Stop reason: HOSPADM

## 2020-05-15 RX ORDER — PROPOFOL 10 MG/ML
VIAL (ML) INTRAVENOUS CONTINUOUS PRN
Status: DISCONTINUED | OUTPATIENT
Start: 2020-05-15 | End: 2020-05-15

## 2020-05-15 RX ORDER — SODIUM CHLORIDE 9 MG/ML
INJECTION, SOLUTION INTRAVENOUS CONTINUOUS PRN
Status: DISCONTINUED | OUTPATIENT
Start: 2020-05-15 | End: 2020-05-15

## 2020-05-15 RX ADMIN — SODIUM CHLORIDE: 0.9 INJECTION, SOLUTION INTRAVENOUS at 08:05

## 2020-05-15 RX ADMIN — Medication 10 MG: at 09:05

## 2020-05-15 RX ADMIN — PROPOFOL 125 MCG/KG/MIN: 10 INJECTION, EMULSION INTRAVENOUS at 09:05

## 2020-05-15 RX ADMIN — PROPOFOL 50 MG: 10 INJECTION, EMULSION INTRAVENOUS at 09:05

## 2020-05-15 RX ADMIN — LIDOCAINE HYDROCHLORIDE 20 MG: 20 INJECTION, SOLUTION INTRAVENOUS at 09:05

## 2020-05-15 NOTE — ANESTHESIA POSTPROCEDURE EVALUATION
Anesthesia Post Evaluation    Patient: Adriana Strong    Procedure(s) Performed: Procedure(s) (LRB):  CARDIOVERSION (N/A)  Transesophageal echo (PEDRO) intra-procedure log documentation (N/A)    Final Anesthesia Type: general    Patient location during evaluation: PACU  Patient participation: Yes- Able to Participate  Level of consciousness: awake and alert  Post-procedure vital signs: reviewed and stable  Pain management: adequate  Airway patency: patent    PONV status at discharge: No PONV  Anesthetic complications: no      Cardiovascular status: blood pressure returned to baseline and stable  Respiratory status: unassisted  Hydration status: euvolemic  Follow-up not needed.          Vitals Value Taken Time   /58 5/15/2020 11:05 AM   Temp 36 °C (96.8 °F) 5/15/2020 11:05 AM   Pulse 85 5/15/2020 11:05 AM   Resp 20 5/15/2020 11:05 AM   SpO2 98 % 5/15/2020 11:05 AM         No case tracking events are documented in the log.      Pain/Naresh Score: Naresh Score: 10 (5/15/2020 11:05 AM)

## 2020-05-15 NOTE — PLAN OF CARE
Received report from PACU RN. Patient s/p DCCV, AAOx3. VSS, no c/o pain or discomfort at this time, resp even and unlabored. Post procedure protocol reviewed with patient. Understanding verbalized. Nurse call bell within reach. Will continue to monitor per post procedure protocol.

## 2020-05-15 NOTE — ASSESSMENT & PLAN NOTE
1. PEDRO for evaluation of FATIMAH prior to DCCV  ASA: 2  Mallampati: 2   EF: 60%    -No absolute contraindications of esophageal stricture, tumor, perforation, laceration,or diverticulum and/or active GI bleed  -The risks, benefits & alternatives of the procedure were explained to the patient.   -The risks of transesophageal echo include but are not limited to:  Dental trauma, esophageal trauma/perforation, bleeding, laryngospasm/brochospasm, aspiration, sore throat/hoarseness, & dislodgement of the endotracheal tube/nasogastric tube (where applicable).    -The risks of moderate sedation include hypotension, respiratory depression, arrhythmias, bronchospasm, & death.    -Informed consent was obtained. The patient is agreeable to proceed with the procedure and all questions and concerns addressed.    Case discussed with an attending in echocardiography lab.     Further recommendations per attending addendum

## 2020-05-15 NOTE — ANESTHESIA PREPROCEDURE EVALUATION
05/15/2020  Adriana Strong is a 75 y.o., female.    Anesthesia Evaluation    I have reviewed the Patient Summary Reports.        Review of Systems  Anesthesia Hx:  No problems with previous Anesthesia    Social:  Non-Smoker    Hematology/Oncology:  Hematology Normal   Oncology Normal     EENT/Dental:EENT/Dental Normal   Cardiovascular:   Hypertension Dysrhythmias atrial fibrillation CHF (normal PEDRO in 2/20, artificial aortic valve present)    Pulmonary:   COPD    Renal/:  Renal/ Normal     Hepatic/GI:   GERD    Musculoskeletal:  Musculoskeletal Normal    Neurological:   TIA, CVA    Endocrine:   Diabetes    Dermatological:  Skin Normal    Psych:  Psychiatric Normal           Physical Exam  General:  Well nourished    Airway/Jaw/Neck:  Airway Findings: Mouth Opening: Normal Tongue: Normal  General Airway Assessment: Adult  Mallampati: II  Improves to II with phonation.  TM Distance: Normal, at least 6 cm  Jaw/Neck Findings:  Neck ROM: Normal ROM      Dental:  Dental Findings: In tact   Chest/Lungs:  Chest/Lungs Findings: Clear to auscultation, Normal Respiratory Rate     Heart/Vascular:  Heart Findings: Rate: Normal  Rhythm: Regular Rhythm  Sounds: Normal             Anesthesia Plan  Type of Anesthesia, risks & benefits discussed:  Anesthesia Type:  general  Patient's Preference: General  Intra-op Monitoring Plan:   Intra-op Monitoring Plan Comments:   Post Op Pain Control Plan:   Post Op Pain Control Plan Comments:   Induction:   IV  Beta Blocker:  Patient is not currently on a Beta-Blocker (No further documentation required).       Informed Consent: Patient understands risks and agrees with Anesthesia plan.  Questions answered. Anesthesia consent signed with patient.  ASA Score: 3     Day of Surgery Review of History & Physical: I have interviewed and examined the patient. I have reviewed the patient's  H&P dated:  There are no significant changes.          Ready For Surgery From Anesthesia Perspective.

## 2020-05-15 NOTE — SUBJECTIVE & OBJECTIVE
Past Medical History:   Diagnosis Date    Acute on chronic diastolic (congestive) heart failure 11/20/2019    Anticoagulant long-term use     on Plavix since May 2015    Anxiety     Arthritis     Asthma     Atrial fibrillation     Cataracts, bilateral     Chest pain, atypical     Colon polyp     Coronary artery disease     Diabetes mellitus     Dry eyes     Esophageal erosions     GERD (gastroesophageal reflux disease)     Heart murmur     Hemorrhoid     High cholesterol     Hypertension     Irritable bowel syndrome     Shingles 2015    Stenosis of aortic and mitral valves     TIA (transient ischemic attack)     Use of cane as ambulatory aid        Past Surgical History:   Procedure Laterality Date    ABDOMINAL SURGERY      stents to SMA    angiocele      2007, 2014    AORTIC VALVE REPLACEMENT N/A 11/19/2019    Procedure: Replacement-valve-aortic;  Surgeon: Jack Capone MD;  Location: [facility] CATH LAB;  Service: Cardiology;  Laterality: N/A;    BREAST SURGERY      left--- a lump--- no cancer    CARDIAC VALVE SURGERY      COLONOSCOPY  2014    COLONOSCOPY N/A 3/14/2018    Procedure: COLONOSCOPY;  Surgeon: Efren Kemp MD;  Location: [facility] ENDO ([address] [place] [address]);  Service: Endoscopy;  Laterality: N/A;  ok to hold Plavix 5 days prior to procedure per Dr RESHMA Hernandez     ok per Dr Kemp to hold Eliquis 2 days prior to procedure (see telephone encounter dated-2/7/18)    HERNIA REPAIR      HYSTERECTOMY  partial    1982 partial hysterectomy    LEFT HEART CATHETERIZATION Right 11/5/2019    Procedure: Left heart cath;  Surgeon: Jack Capone MD;  Location: [facility] CATH LAB;  Service: Cardiology;  Laterality: Right;    left nasal polyp      left neck lymph node      nose polyp      right hip fatty mass tissue      stent to small intestine      SUPERIOR VENA CAVA ANGIOPLASTY / STENTING      TONSILLECTOMY      TOTAL ABDOMINAL HYSTERECTOMY      2014    TOTAL KNEE ARTHROPLASTY Bilateral     TREATMENT OF  CARDIAC ARRHYTHMIA N/A 2/10/2020    Procedure: CARDIOVERSION;  Surgeon: Jayy Parrish MD;  Location: [facility] EP LAB;  Service: Cardiology;  Laterality: N/A;  AF, PEDRO, DCCV, MAC, DM, 3 Prep    UPPER GASTROINTESTINAL ENDOSCOPY  2014       Review of patient's allergies indicates:   Allergen Reactions    Celebrex [celecoxib] Shortness Of Breath    Ciprofloxacin Swelling     lip    Dicyclomine Hives    Adhesive Dermatitis    Avelox [moxifloxacin] Swelling    Cilostazol Other (See Comments)     Elevates blood pressure    Eryc [erythromycin] Other (See Comments)     unknown    Iodine and iodide containing products Hives    Keflex [cephalexin] Hives    Meclomen      rashes    Meloxicam      Ear ringing    Metoclopramide Other (See Comments)     High blood pressure    Sulfa (sulfonamide antibiotics) Itching       Current Facility-Administered Medications on File Prior to Encounter   Medication    0.9%  NaCl infusion    sodium chloride 0.9% flush 5 mL     Current Outpatient Medications on File Prior to Encounter   Medication Sig    acetaminophen (TYLENOL ARTHRITIS) 650 MG TbSR Take 1,300 mg by mouth 2 (two) times daily.    albuterol (ACCUNEB) 1.25 mg/3 mL Nebu Take scheduled q4 for the next two days while at home then resume prn dosing    albuterol (VENTOLIN HFA) 90 mcg/actuation inhaler Inhale 2 puffs into the lungs every 6 (six) hours as needed for Wheezing. Rescue    apixaban (ELIQUIS) 5 mg Tab Take 1 tablet (5 mg total) by mouth 2 (two) times daily.    ascorbic acid (VITAMIN C) 500 MG tablet Take 500 mg by mouth once daily.    azelastine (ASTELIN) 137 mcg nasal spray 1 spray by Nasal route 2 (two) times daily.    bisacodyl (DULCOLAX) 5 mg EC tablet Take 5 mg by mouth daily as needed for Constipation.    budesonide-formoterol 160-4.5 mcg (SYMBICORT) 160-4.5 mcg/actuation HFAA Inhale 2 puffs into the lungs every 12 (twelve) hours. Controller    calcium carbonate (OS-MICHAEL) 600 mg (1,500 mg) Tab Take  1,200 mg by mouth once daily.    cimetidine (TAGAMET) 200 MG tablet Take 300 mg by mouth.     clopidogrel (PLAVIX) 75 mg tablet Take 1 tablet (75 mg total) by mouth once daily.    cyanocobalamin (VITAMIN B-12) 1000 MCG tablet Take 100 mcg by mouth once daily.    diltiaZEM (CARDIZEM CD) 180 MG 24 hr capsule Take 1 capsule (180 mg total) by mouth once daily.    diphenhydrAMINE (BENADRYL) 25 mg capsule Take 50 mg by mouth.     DULoxetine (CYMBALTA) 20 MG capsule Take 1 capsule (20 mg total) by mouth 2 (two) times daily.    famotidine (PEPCID) 20 MG tablet Take 1 tablet (20 mg total) by mouth every 6 (six) hours. for 3 doses    fexofenadine (ALLEGRA) 60 MG tablet Take 60 mg by mouth every morning.    fish oil-omega-3 fatty acids 300-1,000 mg capsule Take 1 g by mouth once daily.     fluconazole (DIFLUCAN) 150 MG Tab TAKE 1 TABLET (150 MG TOTAL) BY MOUTH ONCE. FOR 1 DOSE    fluticasone (FLONASE) 50 mcg/actuation nasal spray 1 spray by Each Nare route every morning.     furosemide (LASIX) 40 MG tablet TAKE 1 TABLET BY MOUTH EVERY DAY    gabapentin (NEURONTIN) 300 MG capsule Take 2 capsules (600 mg total) by mouth 3 (three) times daily.    isosorbide mononitrate (IMDUR) 30 MG 24 hr tablet Take 1 tablet (30 mg total) by mouth once daily.    lisinopril (PRINIVIL,ZESTRIL) 20 MG tablet Take 1 tablet (20 mg total) by mouth 2 (two) times daily.    lorazepam (ATIVAN) 0.5 MG tablet Take 0.5 mg by mouth every morning.     magnesium 250 mg Tab Take 250 mg by mouth. 3 in am and 3 pm    metFORMIN (GLUCOPHAGE) 500 MG tablet Take 500 mg by mouth 2 (two) times daily.    nitroGLYCERIN (NITROSTAT) 0.4 MG SL tablet Place 1 tablet (0.4 mg total) under the tongue every 5 (five) minutes as needed for Chest pain.    nystatin (MYCOSTATIN) powder Apply topically 4 (four) times daily.    ondansetron (ZOFRAN-ODT) 4 MG TbDL DISSOLVE 1 TABLET ON TONGUE EVERY 8 HOURS AS NEEDED FOR NAUSEA    oxybutynin (DITROPAN-XL) 5 MG TR24 Take  5 mg by mouth every other day.     pantoprazole (PROTONIX) 40 MG tablet Take 1 tablet (40 mg total) by mouth once daily. (Patient taking differently: Take 40 mg by mouth every morning. )    POLYSORBATE 80/GLYCERIN (REFRESH DRY EYE THERAPY OPHT) Apply to eye 2 (two) times daily.    potassium gluconate 550 mg (90 mg) Tab Take by mouth. 2 in am and 2 pm    traMADol (ULTRAM) 50 mg tablet Take 1 tablet (50 mg total) by mouth every 6 (six) hours as needed for Pain.    zafirlukast (ACCOLATE) 20 MG tablet Take 20 mg by mouth once daily.     Family History     None        Tobacco Use    Smoking status: Never Smoker    Smokeless tobacco: Never Used   Substance and Sexual Activity    Alcohol use: No    Drug use: No    Sexual activity: Not on file     Review of Systems   Constitution: Negative for chills.   HENT: Negative for congestion.    Eyes: Negative for blurred vision.   Cardiovascular: Negative for chest pain, leg swelling, near-syncope, palpitations and syncope.   Respiratory: Negative for cough and shortness of breath.    Endocrine: Negative for polyuria.   Skin: Negative for itching and rash.   Musculoskeletal: Negative for back pain.   Gastrointestinal: Negative for abdominal pain, constipation, diarrhea, nausea and vomiting.   Genitourinary: Negative for dysuria.   Neurological: Negative for dizziness, headaches and light-headedness.   Psychiatric/Behavioral: Negative for altered mental status.     Objective:     Vital Signs (Most Recent):  Temp: 97.4 °F (36.3 °C) (05/15/20 0631)  Pulse: (!) 118 (05/15/20 0631)  Resp: 18 (05/15/20 0631)  BP: 136/65 (05/15/20 0632)  SpO2: 97 % (05/15/20 0631) Vital Signs (24h Range):  Temp:  [97.4 °F (36.3 °C)] 97.4 °F (36.3 °C)  Pulse:  [118] 118  Resp:  [18] 18  SpO2:  [97 %] 97 %  BP: (136-140)/(65-74) 136/65     Weight: 102.5 kg (226 lb)  Body mass index is 38.79 kg/m².    SpO2: 97 %  O2 Device (Oxygen Therapy): room air    No intake or output data in the 24 hours  ending 05/15/20 0635    Lines/Drains/Airways     None                 Physical Exam   Constitutional: She is oriented to person, place, and time. She appears well-developed and well-nourished.   HENT:   Head: Normocephalic and atraumatic.   Eyes: Pupils are equal, round, and reactive to light. Conjunctivae are normal.   Neck: Normal range of motion. Neck supple.   Cardiovascular: Normal rate, regular rhythm, S1 normal, S2 normal and normal heart sounds. Exam reveals no gallop and no friction rub.   No murmur heard.  Pulses:       Radial pulses are 2+ on the right side, and 2+ on the left side.   Pulmonary/Chest: Effort normal and breath sounds normal. No respiratory distress. She has no wheezes. She has no rales. She exhibits no tenderness.   Abdominal: Soft. Bowel sounds are normal. She exhibits no distension and no mass. There is no tenderness. There is no rebound and no guarding.   Musculoskeletal: She exhibits no edema.   Neurological: She is alert and oriented to person, place, and time.   Skin: Skin is warm and dry.   Psychiatric: She has a normal mood and affect.       Significant Labs:   BMP: No results for input(s): GLU, NA, K, CL, CO2, BUN, CREATININE, CALCIUM, MG in the last 48 hours., CMP No results for input(s): NA, K, CL, CO2, GLU, BUN, CREATININE, CALCIUM, PROT, ALBUMIN, BILITOT, ALKPHOS, AST, ALT, ANIONGAP, ESTGFRAFRICA, EGFRNONAA in the last 48 hours., CBC No results for input(s): WBC, HGB, HCT, PLT in the last 48 hours., INR No results for input(s): INR, PROTIME in the last 48 hours., Lipid Panel No results for input(s): CHOL, HDL, LDLCALC, TRIG, CHOLHDL in the last 48 hours.,   Pathology Results  (Last 10 years)    None       and All pertinent lab results from the last 24 hours have been reviewed.    Significant Imaging: CT scan: CT ABDOMEN PELVIS WITH CONTRAST: No results found for this visit on 05/15/20. and CT ABDOMEN PELVIS WITHOUT CONTRAST: No results found for this visit on 05/15/20.,  Echocardiogram:   2D echo with color flow doppler:   Results for orders placed or performed during the hospital encounter of 04/30/18   Echo doppler color flow   Result Value Ref Range    QEF 65 55 - 65    Aortic Valve Stenosis MODERATE (A)     Est. PA Systolic Pressure 31.3     Mitral Valve Mobility NORMAL     Tricuspid Valve Regurgitation TRIVIAL TO MILD     Narrative    Date of Procedure: 04/30/2018        TEST DESCRIPTION   Technical Quality: This is a technically adequate study.     General: The patient was in an irregularly irregular rhythm throughout the study.     Aorta: The aortic root is normal in size, measuring 2.8 cm at sinotubular junction.     Left Atrium: The left atrium is normal in size, measuring 4.7 cm across in the parasternal view, and 5.7 cm across in the apical view.     Left Ventricle: The left ventricle is normal in size, with an end-diastolic diameter of 3.9 cm, and an end-systolic diameter of 2.9 cm. LV wall thickness is normal, with the septum measuring 1.1 cm and the posterior wall measuring 1.0 cm across. Relative   wall thickness was increased at 0.51, and the LV mass index was 74.7 g/m2 consistent with concentric remodeling. There are no regional wall motion abnormalities. Left ventricular systolic function appears normal. Visually estimated ejection fraction is   60-65%. The LV Doppler derived stroke volume equals 119.0 ccs.     Diastolic indices: Diastolic function is indeterminate.     Right Atrium: The right atrium is normal in size, measuring 4.7 cm in length in the apical view.     Right Ventricle: The right ventricle is normal in size. Global right ventricular systolic function appears normal. The estimated PA systolic pressure is 31 mmHg.     Aortic Valve:  The aortic valve is moderately sclerotic. The aortic valve is tri-leaflet in structure. The peak velocity obtained across the aortic valve is 3.22 m/s, which translates to a peak gradient of 41 mmHg. The mean gradient is  29 mmHg. Using a   left ventricular outflow tract diameter of 2.0 cm, a left ventricular outflow tract velocity time integral of 38 cm, and a peak instantaneous transvalvular velocity time integral of 80 cm, the calculated aortic valve area is 1.49 cm2(AVAi is 0.75   cm2/m2), consistent with moderate aortic stenosis.     Mitral Valve:  The mitral valve is mildly sclerotic with normal leaflet mobility.     Tricuspid Valve:  The tricuspid valve is mildly sclerotic with normal leaflet mobility. There is trivial to mild tricuspid regurgitation.     Pulmonary Valve:  The pulmonic valve is not well seen.     IVC: IVC is normal in size and collapses > 50% with a sniff, suggesting normal right atrial pressure of 3 mmHg.     Atrial Septum: The atrial septum is intact.     Intracavitary: There is no evidence of pericardial effusion, intracavity mass, thrombi, or vegetation.         CONCLUSIONS     1 - Normal left ventricular systolic function (EF 60-65%).     2 - Indeterminate LV diastolic function.     3 - Moderate aortic stenosis, MISTY = 1.49 cm2, AVAi = 0.75 cm2/m2, peak velocity = 3.22 m/s, mean gradient = 29 mmHg.     4 - The estimated PA systolic pressure is 31 mmHg.             This document has been electronically    SIGNED BY: Steven Suresh MD On: 05/01/2018 08:10    and Transthoracic echo (TTE) complete (Cupid Only):   Results for orders placed or performed during the hospital encounter of 12/18/19   Echo   Result Value Ref Range    Ascending aorta 2.90 cm    STJ 2.69 cm    AV mean gradient 10 mmHg    Ao peak mazin 1.97 m/s    Ao VTI 42.60 cm    IVS 1.11 (A) 0.6 - 1.1 cm    LA size 4.55 cm    Left Atrium Major Axis 6.10 cm    Left Atrium Minor Axis 6.00 cm    LVIDD 4.84 3.5 - 6.0 cm    LVIDS 3.18 2.1 - 4.0 cm    LVOT diameter 2.47 cm    LVOT peak VTI 32.89 cm    PW 1.15 (A) 0.6 - 1.1 cm    MV Peak E Mazin 1.14 m/s    PV Peak D Mazin 0.81 m/s    PV Peak S Mazin 0.39 m/s    RA Major Axis 5.80 cm    RA Width 3.90 cm    RVDD  3.76 cm    Sinus 2.76 cm    TAPSE 2.02 cm    TR Max Mazin 3.07 m/s    TDI LATERAL 0.09 m/s    TDI SEPTAL 0.07 m/s    LA WIDTH 4.00 cm    LV Diastolic Volume 109.87 mL    LV Systolic Volume 40.47 mL    RV S' 11.17 cm/s    LVOT peak mazin 1.52 m/s    LV LATERAL E/E' RATIO 12.67 m/s    LV SEPTAL E/E' RATIO 16.29 m/s    FS 34 %    LA volume 93.59 cm3    LV mass 204.05 g    Left Ventricle Relative Wall Thickness 0.48 cm    AV valve area 3.70 cm2    AV Velocity Ratio 0.77     AV index (prosthetic) 0.77     Mean e' 0.08 m/s    Pulm vein S/D ratio 0.48     LVOT area 4.8 cm2    LVOT stroke volume 157.52 cm3    AV peak gradient 16 mmHg    E/E' ratio 14.25 m/s    Triscuspid Valve Regurgitation Peak Gradient 38 mmHg    BSA 2.16 m2    LV Systolic Volume Index 19.6 mL/m2    LV Diastolic Volume Index 53.13 mL/m2    LA Volume Index 45.3 mL/m2    LV Mass Index 99 g/m2    Right Atrial Pressure (from IVC) 3 mmHg    TV rest pulmonary artery pressure 41 mmHg    Narrative    · Concentric left ventricular hypertrophy.  · Normal left ventricular systolic function. The estimated ejection   fraction is 60%.  · Normal right ventricular systolic function.  · Left ventricular diastolic dysfunction.  · Biatrial enlargement.  · There is a 26 mm Phan Suzi S3 transcutaneously-placed aortic   bioprosthesis present. There is no aortic insufficiency present. Mean   gradient = 10mm Hg.  · Small posterior pericardial effusion.  · The estimated PA systolic pressure is 41 mm Hg  · Normal central venous pressure (3 mm Hg).       and X-Ray: CXR: X-Ray Chest 1 View (CXR): No results found for this visit on 05/15/20. and X-Ray Chest PA and Lateral (CXR): No results found for this visit on 05/15/20. and KUB: X-Ray Abdomen AP 1 View (KUB): No results found for this visit on 05/15/20.

## 2020-05-15 NOTE — PROGRESS NOTES
Pt DC'd per MD order. Discharge instructions given including activity, future appointments, and when to call MD. Medications reviewed including drug name, indication, dosage, frequency, and when to take next dose. PIV DC'd, catheter tip intact. Pt left unit via wheelchair with transport.

## 2020-05-15 NOTE — DISCHARGE SUMMARY
[facility]  Cardiology  Discharge Summary      Patient Name: Adriana Strong  MRN: [id number]  Admission Date: 5/15/2020  Hospital Length of Stay: 0 days  Discharge Date and Time:  05/15/2020   Attending Physician:  MD Hany Bee   Discharging Provider: Dariela Vargas NP  Primary Care Physician: Krzysztof Mcgraw MD       HPI: Ms. Strong is a 75 year old Female  with a PMH of severe AS, s/p TAVR 2019, syncope prior to TAVR, CVA/TIA, DM2 HFpEF,  HTN, AF with PEDRO/ cardioversion on 2/2020 currently on eliquis 5 mg BID.   Patient with recent recurrence of AF hence planned for repeat PEDRO/ Cardioversion       Patient presented to short stay cardiac unit on 5/ 15/2020   for planned cardioversion   ,Patient denies any  noted bleeding,  overt shortness of breath, chest pains, rash , fevers, chills, or any other acute symptoms.  My interpretation ECG is AF   Patient reports  Compliance with Eliquis 5  mg BID for CVA prophylaxis denies any bleeding episodes.    negative covid test on 5/14/2020, denies fevers or cough      2/20/2020 PEDRO   Conclusion   · Normal left ventricular systolic function.  · Normal right ventricular systolic function.  · There is a 26 mm Phan Suzi S3 transcutaneously-placed aortic bioprosthesis present. There is no aortic insufficiency present.  · Mild mitral regurgitation.  · Mild tricuspid regurgitation.  · Grade 3 atheroma present in the transverse aorta.  · No evidence of intracardiac thrombus.         Procedure(s) (LRB):  CARDIOVERSION (N/A)  Transesophageal echo (PEDRO) intra-procedure log documentation (N/A)       Hospital Course: PEDRO done showed no FATIMAH thrombus, hence proceeded to DCCV which converted patient from AF to sinus rhythm     Patient tolerated procedure, no acute complications noted     Post procedure  EKG showed  NSR .   Plan to continue home medications including Eliquis 5 mg twice daily for CVA prophylaxis  For rhythm control, instructed to start Amiodarone with  a Loading dose: Take 400 mg ( 2 tablets ) twice daily for 2 weeks, then reduce to 400 mg ( 2 tablets) once daily  for 2 weeks , then reduce to 200 mg  ( 1 tablet) once daily thereafter.   Pt to follow up with EKG in 1 week,  and 4 weeks with MD Hany Bee    Discharge plans/instructions discussed with patient and her daughter Mrs Justo Munoz      who verbalized understanding  and agreement of plans of care. No further questions or concerns  voiced at this time.       New Medications:  - amiodarone with load        Consults: Anesthesia     Final Active Diagnoses:    Diagnosis Date Noted POA    PRINCIPAL PROBLEM:  Persistent atrial fibrillation [I48.19] 02/10/2020 Yes    Atrial flutter [I48.92] 05/15/2020 Yes      Problems Resolved During this Admission:       Discharged Condition: good    Follow Up:  Follow-up Information     Hany Bee MD In 4 weeks.    Specialties:  Electrophysiology, Cardiology  Why:  Ep will schedule for an EKG in 1 week, and you have a clinic follow up with MD Bee in 1 month   Contact information:  [facility] [facility] [address]  [phone]                 Patient Instructions:      Diet Cardiac     Other restrictions (specify):   Order Comments: Medications:  -Continue blood thinner eliquis 5 mg twice daily  for stroke prevention   - For  rhythm control : s  Take 2 tablets (400 mg) twice daily for 14 days, then  Reduce to 2 tablets  (400mg) once  daily for 14 days, then reduce to  1 tablet (200mg) once daily thereafter     -If you have problems or side effects caused by your medications, call the clinic.        Diet  -You may resume oral intake after you are discharged, as long you have no swallowing difficulties.     Side effects:  -You may be drowsy for the remainder of the day from the sedation.     Because you have received sedation for this procedure:  -Limit activity for the remainder of the day.    -Do not smoke for at least 6 hours and until you are fully awake  and alert.  -Do not drink alcoholic beverage for 24 hours.  -Defer important decision making until the following day.     Go to the Emergency Department if you develop:   -Bleeding  -Weakness or numbness  -Visual, gait or speech disturbance  -New chest pain, palpitations, shortness of breath, rapid heart beat, or fainting  -Fever        Notify your health care provider if you experience any of the following:  temperature >100.4     Notify your health care provider if you experience any of the following:  persistent nausea and vomiting or diarrhea     Notify your health care provider if you experience any of the following:  severe uncontrolled pain     Notify your health care provider if you experience any of the following:  redness, tenderness, or signs of infection (pain, swelling, redness, odor or green/yellow discharge around incision site)     Notify your health care provider if you experience any of the following:  difficulty breathing or increased cough     Notify your health care provider if you experience any of the following:  severe persistent headache     Notify your health care provider if you experience any of the following:  worsening rash     Notify your health care provider if you experience any of the following:  persistent dizziness, light-headedness, or visual disturbances     Notify your health care provider if you experience any of the following:  increased confusion or weakness     Notify your health care provider if you experience any of the following:   Order Comments: For any concerning medical symptoms     Medications:  Reconciled Home Medications:      Medication List      START taking these medications    amiodarone 200 MG Tab  Commonly known as:  PACERONE  Take 400 mg ( 2 tablets ) twice daily for 2 weeks, then reduce to 400 mg ( 2 tablets) once daily  for 2 weeks , then reduce to 200 mg  ( 1 tablet) once daily thereafter.        CHANGE how you take these medications    pantoprazole 40  MG tablet  Commonly known as:  PROTONIX  Take 1 tablet (40 mg total) by mouth once daily.  What changed:  when to take this        CONTINUE taking these medications    * VENTOLIN HFA 90 mcg/actuation inhaler  Generic drug:  albuterol  Inhale 2 puffs into the lungs every 6 (six) hours as needed for Wheezing. Rescue     * albuterol 1.25 mg/3 mL Nebu  Commonly known as:  ACCUNEB  Take scheduled q4 for the next two days while at home then resume prn dosing     apixaban 5 mg Tab  Commonly known as:  ELIQUIS  Take 1 tablet (5 mg total) by mouth 2 (two) times daily.     azelastine 137 mcg (0.1 %) nasal spray  Commonly known as:  ASTELIN  1 spray by Nasal route 2 (two) times daily.     bisacodyL 5 mg EC tablet  Commonly known as:  DULCOLAX  Take 5 mg by mouth daily as needed for Constipation.     calcium carbonate 600 mg calcium (1,500 mg) Tab  Commonly known as:  OS-MIHCAEL  Take 1,200 mg by mouth once daily.     cimetidine 200 MG tablet  Commonly known as:  TAGAMET  Take 300 mg by mouth.     clopidogreL 75 mg tablet  Commonly known as:  PLAVIX  Take 1 tablet (75 mg total) by mouth once daily.     diltiaZEM 180 MG 24 hr capsule  Commonly known as:  CARDIZEM CD  Take 1 capsule (180 mg total) by mouth once daily.     diphenhydrAMINE 25 mg capsule  Commonly known as:  BENADRYL  Take 50 mg by mouth.     DULoxetine 20 MG capsule  Commonly known as:  CYMBALTA  Take 1 capsule (20 mg total) by mouth 2 (two) times daily.     famotidine 20 MG tablet  Commonly known as:  PEPCID  Take 1 tablet (20 mg total) by mouth every 6 (six) hours. for 3 doses     fexofenadine 60 MG tablet  Commonly known as:  ALLEGRA  Take 60 mg by mouth every morning.     fish oil-omega-3 fatty acids 300-1,000 mg capsule  Take 1 g by mouth once daily.     fluconazole 150 MG Tab  Commonly known as:  DIFLUCAN  TAKE 1 TABLET (150 MG TOTAL) BY MOUTH ONCE. FOR 1 DOSE     fluticasone propionate 50 mcg/actuation nasal spray  Commonly known as:  FLONASE  1 spray by Each  Nare route every morning.     furosemide 40 MG tablet  Commonly known as:  LASIX  TAKE 1 TABLET BY MOUTH EVERY DAY     gabapentin 300 MG capsule  Commonly known as:  NEURONTIN  Take 2 capsules (600 mg total) by mouth 3 (three) times daily.     isosorbide mononitrate 30 MG 24 hr tablet  Commonly known as:  IMDUR  Take 1 tablet (30 mg total) by mouth once daily.     lisinopriL 20 MG tablet  Commonly known as:  PRINIVIL,ZESTRIL  Take 1 tablet (20 mg total) by mouth 2 (two) times daily.     LORazepam 0.5 MG tablet  Commonly known as:  ATIVAN  Take 0.5 mg by mouth every morning.     magnesium 250 mg Tab  Take 250 mg by mouth. 3 in am and 3 pm     metFORMIN 500 MG tablet  Commonly known as:  GLUCOPHAGE  Take 500 mg by mouth 2 (two) times daily.     nitroGLYCERIN 0.4 MG SL tablet  Commonly known as:  NITROSTAT  Place 1 tablet (0.4 mg total) under the tongue every 5 (five) minutes as needed for Chest pain.     nystatin powder  Commonly known as:  MYCOSTATIN  Apply topically 4 (four) times daily.     ondansetron 4 MG Tbdl  Commonly known as:  ZOFRAN-ODT  DISSOLVE 1 TABLET ON TONGUE EVERY 8 HOURS AS NEEDED FOR NAUSEA     oxybutynin 5 MG Tr24  Commonly known as:  DITROPAN-XL  Take 5 mg by mouth every other day.     potassium gluconate 550 mg (90 mg) Tab  Take by mouth. 2 in am and 2 pm     REFRESH DRY EYE THERAPY OPHT  Apply to eye 2 (two) times daily.     SYMBICORT 160-4.5 mcg/actuation Hfaa  Generic drug:  budesonide-formoterol 160-4.5 mcg  Inhale 2 puffs into the lungs every 12 (twelve) hours. Controller     traMADoL 50 mg tablet  Commonly known as:  ULTRAM  Take 1 tablet (50 mg total) by mouth every 6 (six) hours as needed for Pain.     TYLENOL ARTHRITIS 650 MG Tbsr  Generic drug:  acetaminophen  Take 1,300 mg by mouth 2 (two) times daily.     VITAMIN B-12 1000 MCG tablet  Generic drug:  cyanocobalamin  Take 100 mcg by mouth once daily.     VITAMIN C 500 MG tablet  Generic drug:  ascorbic acid (vitamin C)  Take 500 mg by  mouth once daily.     zafirlukast 20 MG tablet  Commonly known as:  ACCOLATE  Take 20 mg by mouth once daily.         * This list has 2 medication(s) that are the same as other medications prescribed for you. Read the directions carefully, and ask your doctor or other care provider to review them with you.                Time spent on the discharge of patient: 20  minutes    Dariela Vargas NP  Cardiology  [facility]    STAFF MD Hany Bee

## 2020-05-15 NOTE — HPI
Adriana Strong is a 75 y.o. year old female. Patient presents as an outpatient for pre-op PEDRO prior to Cardioversion     Dysphagia or odynophagia:  No  Liver Disease, esophageal disease, or known varices:  No  Upper GI Bleeding: No  Snoring:  No  Sleep Apnea:  No  Prior neck surgery or radiation:  No  History of anesthetic difficulties:  No  Family history of anesthetic difficulties:  No  Last oral intake:  12 hours ago  Able to move neck in all directions:  Yes  Stroke History:  Yes  Last dose of anticoagulation:  This Morning    Anticoagulation: Eliquis  Antiplatelets: None  Platelet count:  Lab Results   Component Value Date     02/03/2020     INR:   Lab Results   Component Value Date    INR 1.3 (H) 02/03/2020    INR 1.0 12/18/2019    INR 1.1 11/26/2019     PTT:   Lab Results   Component Value Date    APTT 43.1 (H) 02/03/2020      Echo: No results found for: EF  2D echo with color flow doppler:   Results for orders placed or performed during the hospital encounter of 04/30/18   Echo doppler color flow   Result Value Ref Range    QEF 65 55 - 65    Aortic Valve Stenosis MODERATE (A)     Est. PA Systolic Pressure 31.3     Mitral Valve Mobility NORMAL     Tricuspid Valve Regurgitation TRIVIAL TO MILD     Narrative    Date of Procedure: 04/30/2018        TEST DESCRIPTION   Technical Quality: This is a technically adequate study.     General: The patient was in an irregularly irregular rhythm throughout the study.     Aorta: The aortic root is normal in size, measuring 2.8 cm at sinotubular junction.     Left Atrium: The left atrium is normal in size, measuring 4.7 cm across in the parasternal view, and 5.7 cm across in the apical view.     Left Ventricle: The left ventricle is normal in size, with an end-diastolic diameter of 3.9 cm, and an end-systolic diameter of 2.9 cm. LV wall thickness is normal, with the septum measuring 1.1 cm and the posterior wall measuring 1.0 cm across. Relative   wall thickness  was increased at 0.51, and the LV mass index was 74.7 g/m2 consistent with concentric remodeling. There are no regional wall motion abnormalities. Left ventricular systolic function appears normal. Visually estimated ejection fraction is   60-65%. The LV Doppler derived stroke volume equals 119.0 ccs.     Diastolic indices: Diastolic function is indeterminate.     Right Atrium: The right atrium is normal in size, measuring 4.7 cm in length in the apical view.     Right Ventricle: The right ventricle is normal in size. Global right ventricular systolic function appears normal. The estimated PA systolic pressure is 31 mmHg.     Aortic Valve:  The aortic valve is moderately sclerotic. The aortic valve is tri-leaflet in structure. The peak velocity obtained across the aortic valve is 3.22 m/s, which translates to a peak gradient of 41 mmHg. The mean gradient is 29 mmHg. Using a   left ventricular outflow tract diameter of 2.0 cm, a left ventricular outflow tract velocity time integral of 38 cm, and a peak instantaneous transvalvular velocity time integral of 80 cm, the calculated aortic valve area is 1.49 cm2(AVAi is 0.75   cm2/m2), consistent with moderate aortic stenosis.     Mitral Valve:  The mitral valve is mildly sclerotic with normal leaflet mobility.     Tricuspid Valve:  The tricuspid valve is mildly sclerotic with normal leaflet mobility. There is trivial to mild tricuspid regurgitation.     Pulmonary Valve:  The pulmonic valve is not well seen.     IVC: IVC is normal in size and collapses > 50% with a sniff, suggesting normal right atrial pressure of 3 mmHg.     Atrial Septum: The atrial septum is intact.     Intracavitary: There is no evidence of pericardial effusion, intracavity mass, thrombi, or vegetation.         CONCLUSIONS     1 - Normal left ventricular systolic function (EF 60-65%).     2 - Indeterminate LV diastolic function.     3 - Moderate aortic stenosis, MISTY = 1.49 cm2, AVAi = 0.75 cm2/m2, peak  velocity = 3.22 m/s, mean gradient = 29 mmHg.     4 - The estimated PA systolic pressure is 31 mmHg.             This document has been electronically    SIGNED BY: Steven Suresh MD On: 05/01/2018 08:10    and Transthoracic echo (TTE) complete (Cupid Only):   Results for orders placed or performed during the hospital encounter of 12/18/19   Echo   Result Value Ref Range    Ascending aorta 2.90 cm    STJ 2.69 cm    AV mean gradient 10 mmHg    Ao peak mazin 1.97 m/s    Ao VTI 42.60 cm    IVS 1.11 (A) 0.6 - 1.1 cm    LA size 4.55 cm    Left Atrium Major Axis 6.10 cm    Left Atrium Minor Axis 6.00 cm    LVIDD 4.84 3.5 - 6.0 cm    LVIDS 3.18 2.1 - 4.0 cm    LVOT diameter 2.47 cm    LVOT peak VTI 32.89 cm    PW 1.15 (A) 0.6 - 1.1 cm    MV Peak E Mazin 1.14 m/s    PV Peak D Mazin 0.81 m/s    PV Peak S Mazin 0.39 m/s    RA Major Axis 5.80 cm    RA Width 3.90 cm    RVDD 3.76 cm    Sinus 2.76 cm    TAPSE 2.02 cm    TR Max Mazin 3.07 m/s    TDI LATERAL 0.09 m/s    TDI SEPTAL 0.07 m/s    LA WIDTH 4.00 cm    LV Diastolic Volume 109.87 mL    LV Systolic Volume 40.47 mL    RV S' 11.17 cm/s    LVOT peak mazin 1.52 m/s    LV LATERAL E/E' RATIO 12.67 m/s    LV SEPTAL E/E' RATIO 16.29 m/s    FS 34 %    LA volume 93.59 cm3    LV mass 204.05 g    Left Ventricle Relative Wall Thickness 0.48 cm    AV valve area 3.70 cm2    AV Velocity Ratio 0.77     AV index (prosthetic) 0.77     Mean e' 0.08 m/s    Pulm vein S/D ratio 0.48     LVOT area 4.8 cm2    LVOT stroke volume 157.52 cm3    AV peak gradient 16 mmHg    E/E' ratio 14.25 m/s    Triscuspid Valve Regurgitation Peak Gradient 38 mmHg    BSA 2.16 m2    LV Systolic Volume Index 19.6 mL/m2    LV Diastolic Volume Index 53.13 mL/m2    LA Volume Index 45.3 mL/m2    LV Mass Index 99 g/m2    Right Atrial Pressure (from IVC) 3 mmHg    TV rest pulmonary artery pressure 41 mmHg    Narrative    · Concentric left ventricular hypertrophy.  · Normal left ventricular systolic function. The estimated ejection    fraction is 60%.  · Normal right ventricular systolic function.  · Left ventricular diastolic dysfunction.  · Biatrial enlargement.  · There is a 26 mm Phan Suzi S3 transcutaneously-placed aortic   bioprosthesis present. There is no aortic insufficiency present. Mean   gradient = 10mm Hg.  · Small posterior pericardial effusion.  · The estimated PA systolic pressure is 41 mm Hg  · Normal central venous pressure (3 mm Hg).

## 2020-05-15 NOTE — TRANSFER OF CARE
"Anesthesia Transfer of Care Note    Patient: Adriana Strong    Procedure(s) Performed: Procedure(s) (LRB):  CARDIOVERSION (N/A)  Transesophageal echo (PEDRO) intra-procedure log documentation (N/A)    Patient location: PACU    Anesthesia Type: general    Transport from OR: Transported from OR on 2-3 L/min O2 by NC with adequate spontaneous ventilation    Post pain: adequate analgesia    Post assessment: no apparent anesthetic complications and tolerated procedure well    Post vital signs: stable    Level of consciousness: alert, awake and oriented    Nausea/Vomiting: no nausea/vomiting    Complications: none    Transfer of care protocol was followed      Last vitals:   Visit Vitals  BP (!) 121/54 (BP Location: Left arm, Patient Position: Lying)   Pulse 86   Temp 36.8 °C (98.3 °F) (Axillary)   Resp 18   Ht 5' 4" (1.626 m)   Wt 102.5 kg (226 lb)   LMP 01/21/1973 (Approximate)   SpO2 100%   Breastfeeding? No   BMI 38.79 kg/m²     "

## 2020-05-15 NOTE — NURSING TRANSFER
Nursing Transfer Note      5/15/2020     Transfer To: SSCU room 4    Transfer via stretcher    Transfer with cardiac monitoring    Transported by RN, PCT    Chart send with patient: Yes    Notified: daughter    Patient reassessed at: 1030    Upon arrival to floor: cardiac monitor applied, patient oriented to room, call bell in reach and bed in lowest position

## 2020-05-15 NOTE — H&P
[facility] Stay Cardiac Unit  Cardiology  History and Physical     Patient Name: Adriana Strong  MRN: [id number]  Admission Date: 5/15/2020  Code Status: Prior   Attending Provider: Hany Bee MD   Primary Care Physician: Krzysztof Mcgraw MD  Principal Problem:<principal problem not specified>    Subjective:     HPI:  Adriana Strong is a 75 y.o. year old female. Patient presents as an outpatient for pre-op PEDRO prior to Cardioversion     Dysphagia or odynophagia:  No  Liver Disease, esophageal disease, or known varices:  No  Upper GI Bleeding: No  Snoring:  No  Sleep Apnea:  No  Prior neck surgery or radiation:  No  History of anesthetic difficulties:  No  Family history of anesthetic difficulties:  No  Last oral intake:  12 hours ago  Able to move neck in all directions:  Yes  Stroke History:  Yes  Last dose of anticoagulation:  This Morning    Anticoagulation: Eliquis  Antiplatelets: None  Platelet count:  Lab Results   Component Value Date     02/03/2020     INR:   Lab Results   Component Value Date    INR 1.3 (H) 02/03/2020    INR 1.0 12/18/2019    INR 1.1 11/26/2019     PTT:   Lab Results   Component Value Date    APTT 43.1 (H) 02/03/2020      Echo: No results found for: EF  2D echo with color flow doppler:   Results for orders placed or performed during the hospital encounter of 04/30/18   Echo doppler color flow   Result Value Ref Range    QEF 65 55 - 65    Aortic Valve Stenosis MODERATE (A)     Est. PA Systolic Pressure 31.3     Mitral Valve Mobility NORMAL     Tricuspid Valve Regurgitation TRIVIAL TO MILD     Narrative    Date of Procedure: 04/30/2018        TEST DESCRIPTION   Technical Quality: This is a technically adequate study.     General: The patient was in an irregularly irregular rhythm throughout the study.     Aorta: The aortic root is normal in size, measuring 2.8 cm at sinotubular junction.     Left Atrium: The left atrium is normal in size, measuring 4.7 cm  across in the parasternal view, and 5.7 cm across in the apical view.     Left Ventricle: The left ventricle is normal in size, with an end-diastolic diameter of 3.9 cm, and an end-systolic diameter of 2.9 cm. LV wall thickness is normal, with the septum measuring 1.1 cm and the posterior wall measuring 1.0 cm across. Relative   wall thickness was increased at 0.51, and the LV mass index was 74.7 g/m2 consistent with concentric remodeling. There are no regional wall motion abnormalities. Left ventricular systolic function appears normal. Visually estimated ejection fraction is   60-65%. The LV Doppler derived stroke volume equals 119.0 ccs.     Diastolic indices: Diastolic function is indeterminate.     Right Atrium: The right atrium is normal in size, measuring 4.7 cm in length in the apical view.     Right Ventricle: The right ventricle is normal in size. Global right ventricular systolic function appears normal. The estimated PA systolic pressure is 31 mmHg.     Aortic Valve:  The aortic valve is moderately sclerotic. The aortic valve is tri-leaflet in structure. The peak velocity obtained across the aortic valve is 3.22 m/s, which translates to a peak gradient of 41 mmHg. The mean gradient is 29 mmHg. Using a   left ventricular outflow tract diameter of 2.0 cm, a left ventricular outflow tract velocity time integral of 38 cm, and a peak instantaneous transvalvular velocity time integral of 80 cm, the calculated aortic valve area is 1.49 cm2(AVAi is 0.75   cm2/m2), consistent with moderate aortic stenosis.     Mitral Valve:  The mitral valve is mildly sclerotic with normal leaflet mobility.     Tricuspid Valve:  The tricuspid valve is mildly sclerotic with normal leaflet mobility. There is trivial to mild tricuspid regurgitation.     Pulmonary Valve:  The pulmonic valve is not well seen.     IVC: IVC is normal in size and collapses > 50% with a sniff, suggesting normal right atrial pressure of 3 mmHg.     Atrial  Septum: The atrial septum is intact.     Intracavitary: There is no evidence of pericardial effusion, intracavity mass, thrombi, or vegetation.         CONCLUSIONS     1 - Normal left ventricular systolic function (EF 60-65%).     2 - Indeterminate LV diastolic function.     3 - Moderate aortic stenosis, MISTY = 1.49 cm2, AVAi = 0.75 cm2/m2, peak velocity = 3.22 m/s, mean gradient = 29 mmHg.     4 - The estimated PA systolic pressure is 31 mmHg.             This document has been electronically    SIGNED BY: Steven Suresh MD On: 05/01/2018 08:10    and Transthoracic echo (TTE) complete (Cupid Only):   Results for orders placed or performed during the hospital encounter of 12/18/19   Echo   Result Value Ref Range    Ascending aorta 2.90 cm    STJ 2.69 cm    AV mean gradient 10 mmHg    Ao peak mazin 1.97 m/s    Ao VTI 42.60 cm    IVS 1.11 (A) 0.6 - 1.1 cm    LA size 4.55 cm    Left Atrium Major Axis 6.10 cm    Left Atrium Minor Axis 6.00 cm    LVIDD 4.84 3.5 - 6.0 cm    LVIDS 3.18 2.1 - 4.0 cm    LVOT diameter 2.47 cm    LVOT peak VTI 32.89 cm    PW 1.15 (A) 0.6 - 1.1 cm    MV Peak E Mazin 1.14 m/s    PV Peak D Mazin 0.81 m/s    PV Peak S Mazin 0.39 m/s    RA Major Axis 5.80 cm    RA Width 3.90 cm    RVDD 3.76 cm    Sinus 2.76 cm    TAPSE 2.02 cm    TR Max Mazin 3.07 m/s    TDI LATERAL 0.09 m/s    TDI SEPTAL 0.07 m/s    LA WIDTH 4.00 cm    LV Diastolic Volume 109.87 mL    LV Systolic Volume 40.47 mL    RV S' 11.17 cm/s    LVOT peak mazin 1.52 m/s    LV LATERAL E/E' RATIO 12.67 m/s    LV SEPTAL E/E' RATIO 16.29 m/s    FS 34 %    LA volume 93.59 cm3    LV mass 204.05 g    Left Ventricle Relative Wall Thickness 0.48 cm    AV valve area 3.70 cm2    AV Velocity Ratio 0.77     AV index (prosthetic) 0.77     Mean e' 0.08 m/s    Pulm vein S/D ratio 0.48     LVOT area 4.8 cm2    LVOT stroke volume 157.52 cm3    AV peak gradient 16 mmHg    E/E' ratio 14.25 m/s    Triscuspid Valve Regurgitation Peak Gradient 38 mmHg    BSA 2.16 m2    LV  Systolic Volume Index 19.6 mL/m2    LV Diastolic Volume Index 53.13 mL/m2    LA Volume Index 45.3 mL/m2    LV Mass Index 99 g/m2    Right Atrial Pressure (from IVC) 3 mmHg    TV rest pulmonary artery pressure 41 mmHg    Narrative    · Concentric left ventricular hypertrophy.  · Normal left ventricular systolic function. The estimated ejection   fraction is 60%.  · Normal right ventricular systolic function.  · Left ventricular diastolic dysfunction.  · Biatrial enlargement.  · There is a 26 mm Phan Suzi S3 transcutaneously-placed aortic   bioprosthesis present. There is no aortic insufficiency present. Mean   gradient = 10mm Hg.  · Small posterior pericardial effusion.  · The estimated PA systolic pressure is 41 mm Hg  · Normal central venous pressure (3 mm Hg).          Past Medical History:   Diagnosis Date    Acute on chronic diastolic (congestive) heart failure 11/20/2019    Anticoagulant long-term use     on Plavix since May 2015    Anxiety     Arthritis     Asthma     Atrial fibrillation     Cataracts, bilateral     Chest pain, atypical     Colon polyp     Coronary artery disease     Diabetes mellitus     Dry eyes     Esophageal erosions     GERD (gastroesophageal reflux disease)     Heart murmur     Hemorrhoid     High cholesterol     Hypertension     Irritable bowel syndrome     Shingles 2015    Stenosis of aortic and mitral valves     TIA (transient ischemic attack)     Use of cane as ambulatory aid        Past Surgical History:   Procedure Laterality Date    ABDOMINAL SURGERY      stents to SMA    angiocele      2007, 2014    AORTIC VALVE REPLACEMENT N/A 11/19/2019    Procedure: Replacement-valve-aortic;  Surgeon: Jack Capone MD;  Location: [facility] CATH LAB;  Service: Cardiology;  Laterality: N/A;    BREAST SURGERY      left--- a lump--- no cancer    CARDIAC VALVE SURGERY      COLONOSCOPY  2014    COLONOSCOPY N/A 3/14/2018    Procedure: COLONOSCOPY;  Surgeon: Efren  MD Justo;  Location: [facility] ENDO (4TH FLR);  Service: Endoscopy;  Laterality: N/A;  ok to hold Plavix 5 days prior to procedure per Dr RESHMA Hernandez     ok per Dr Kemp to hold Eliquis 2 days prior to procedure (see telephone encounter dated-2/7/18)    HERNIA REPAIR      HYSTERECTOMY  partial    1982 partial hysterectomy    LEFT HEART CATHETERIZATION Right 11/5/2019    Procedure: Left heart cath;  Surgeon: Jack Capone MD;  Location: [facility] CATH LAB;  Service: Cardiology;  Laterality: Right;    left nasal polyp      left neck lymph node      nose polyp      right hip fatty mass tissue      stent to small intestine      SUPERIOR VENA CAVA ANGIOPLASTY / STENTING      TONSILLECTOMY      TOTAL ABDOMINAL HYSTERECTOMY      2014    TOTAL KNEE ARTHROPLASTY Bilateral     TREATMENT OF CARDIAC ARRHYTHMIA N/A 2/10/2020    Procedure: CARDIOVERSION;  Surgeon: Jayy Parrish MD;  Location: [facility] EP LAB;  Service: Cardiology;  Laterality: N/A;  AF, PEDRO, DCCV, MAC, DM, 3 Prep    UPPER GASTROINTESTINAL ENDOSCOPY  2014       Review of patient's allergies indicates:   Allergen Reactions    Celebrex [celecoxib] Shortness Of Breath    Ciprofloxacin Swelling     lip    Dicyclomine Hives    Adhesive Dermatitis    Avelox [moxifloxacin] Swelling    Cilostazol Other (See Comments)     Elevates blood pressure    Eryc [erythromycin] Other (See Comments)     unknown    Iodine and iodide containing products Hives    Keflex [cephalexin] Hives    Meclomen      rashes    Meloxicam      Ear ringing    Metoclopramide Other (See Comments)     High blood pressure    Sulfa (sulfonamide antibiotics) Itching       Current Facility-Administered Medications on File Prior to Encounter   Medication    0.9%  NaCl infusion    sodium chloride 0.9% flush 5 mL     Current Outpatient Medications on File Prior to Encounter   Medication Sig    acetaminophen (TYLENOL ARTHRITIS) 650 MG TbSR Take 1,300 mg by mouth 2 (two) times daily.    albuterol  (ACCUNEB) 1.25 mg/3 mL Nebu Take scheduled q4 for the next two days while at home then resume prn dosing    albuterol (VENTOLIN HFA) 90 mcg/actuation inhaler Inhale 2 puffs into the lungs every 6 (six) hours as needed for Wheezing. Rescue    apixaban (ELIQUIS) 5 mg Tab Take 1 tablet (5 mg total) by mouth 2 (two) times daily.    ascorbic acid (VITAMIN C) 500 MG tablet Take 500 mg by mouth once daily.    azelastine (ASTELIN) 137 mcg nasal spray 1 spray by Nasal route 2 (two) times daily.    bisacodyl (DULCOLAX) 5 mg EC tablet Take 5 mg by mouth daily as needed for Constipation.    budesonide-formoterol 160-4.5 mcg (SYMBICORT) 160-4.5 mcg/actuation HFAA Inhale 2 puffs into the lungs every 12 (twelve) hours. Controller    calcium carbonate (OS-MICHAEL) 600 mg (1,500 mg) Tab Take 1,200 mg by mouth once daily.    cimetidine (TAGAMET) 200 MG tablet Take 300 mg by mouth.     clopidogrel (PLAVIX) 75 mg tablet Take 1 tablet (75 mg total) by mouth once daily.    cyanocobalamin (VITAMIN B-12) 1000 MCG tablet Take 100 mcg by mouth once daily.    diltiaZEM (CARDIZEM CD) 180 MG 24 hr capsule Take 1 capsule (180 mg total) by mouth once daily.    diphenhydrAMINE (BENADRYL) 25 mg capsule Take 50 mg by mouth.     DULoxetine (CYMBALTA) 20 MG capsule Take 1 capsule (20 mg total) by mouth 2 (two) times daily.    famotidine (PEPCID) 20 MG tablet Take 1 tablet (20 mg total) by mouth every 6 (six) hours. for 3 doses    fexofenadine (ALLEGRA) 60 MG tablet Take 60 mg by mouth every morning.    fish oil-omega-3 fatty acids 300-1,000 mg capsule Take 1 g by mouth once daily.     fluconazole (DIFLUCAN) 150 MG Tab TAKE 1 TABLET (150 MG TOTAL) BY MOUTH ONCE. FOR 1 DOSE    fluticasone (FLONASE) 50 mcg/actuation nasal spray 1 spray by Each Nare route every morning.     furosemide (LASIX) 40 MG tablet TAKE 1 TABLET BY MOUTH EVERY DAY    gabapentin (NEURONTIN) 300 MG capsule Take 2 capsules (600 mg total) by mouth 3 (three) times daily.     isosorbide mononitrate (IMDUR) 30 MG 24 hr tablet Take 1 tablet (30 mg total) by mouth once daily.    lisinopril (PRINIVIL,ZESTRIL) 20 MG tablet Take 1 tablet (20 mg total) by mouth 2 (two) times daily.    lorazepam (ATIVAN) 0.5 MG tablet Take 0.5 mg by mouth every morning.     magnesium 250 mg Tab Take 250 mg by mouth. 3 in am and 3 pm    metFORMIN (GLUCOPHAGE) 500 MG tablet Take 500 mg by mouth 2 (two) times daily.    nitroGLYCERIN (NITROSTAT) 0.4 MG SL tablet Place 1 tablet (0.4 mg total) under the tongue every 5 (five) minutes as needed for Chest pain.    nystatin (MYCOSTATIN) powder Apply topically 4 (four) times daily.    ondansetron (ZOFRAN-ODT) 4 MG TbDL DISSOLVE 1 TABLET ON TONGUE EVERY 8 HOURS AS NEEDED FOR NAUSEA    oxybutynin (DITROPAN-XL) 5 MG TR24 Take 5 mg by mouth every other day.     pantoprazole (PROTONIX) 40 MG tablet Take 1 tablet (40 mg total) by mouth once daily. (Patient taking differently: Take 40 mg by mouth every morning. )    POLYSORBATE 80/GLYCERIN (REFRESH DRY EYE THERAPY OPHT) Apply to eye 2 (two) times daily.    potassium gluconate 550 mg (90 mg) Tab Take by mouth. 2 in am and 2 pm    traMADol (ULTRAM) 50 mg tablet Take 1 tablet (50 mg total) by mouth every 6 (six) hours as needed for Pain.    zafirlukast (ACCOLATE) 20 MG tablet Take 20 mg by mouth once daily.     Family History     None        Tobacco Use    Smoking status: Never Smoker    Smokeless tobacco: Never Used   Substance and Sexual Activity    Alcohol use: No    Drug use: No    Sexual activity: Not on file     Review of Systems   Constitution: Negative for chills.   HENT: Negative for congestion.    Eyes: Negative for blurred vision.   Cardiovascular: Negative for chest pain, leg swelling, near-syncope, palpitations and syncope.   Respiratory: Negative for cough and shortness of breath.    Endocrine: Negative for polyuria.   Skin: Negative for itching and rash.   Musculoskeletal: Negative for back pain.    Gastrointestinal: Negative for abdominal pain, constipation, diarrhea, nausea and vomiting.   Genitourinary: Negative for dysuria.   Neurological: Negative for dizziness, headaches and light-headedness.   Psychiatric/Behavioral: Negative for altered mental status.     Objective:     Vital Signs (Most Recent):  Temp: 97.4 °F (36.3 °C) (05/15/20 0631)  Pulse: (!) 118 (05/15/20 0631)  Resp: 18 (05/15/20 0631)  BP: 136/65 (05/15/20 0632)  SpO2: 97 % (05/15/20 0631) Vital Signs (24h Range):  Temp:  [97.4 °F (36.3 °C)] 97.4 °F (36.3 °C)  Pulse:  [118] 118  Resp:  [18] 18  SpO2:  [97 %] 97 %  BP: (136-140)/(65-74) 136/65     Weight: 102.5 kg (226 lb)  Body mass index is 38.79 kg/m².    SpO2: 97 %  O2 Device (Oxygen Therapy): room air    No intake or output data in the 24 hours ending 05/15/20 0635    Lines/Drains/Airways     None                 Physical Exam   Constitutional: She is oriented to person, place, and time. She appears well-developed and well-nourished.   HENT:   Head: Normocephalic and atraumatic.   Eyes: Pupils are equal, round, and reactive to light. Conjunctivae are normal.   Neck: Normal range of motion. Neck supple.   Cardiovascular: Normal rate, regular rhythm, S1 normal, S2 normal and normal heart sounds. Exam reveals no gallop and no friction rub.   No murmur heard.  Pulses:       Radial pulses are 2+ on the right side, and 2+ on the left side.   Pulmonary/Chest: Effort normal and breath sounds normal. No respiratory distress. She has no wheezes. She has no rales. She exhibits no tenderness.   Abdominal: Soft. Bowel sounds are normal. She exhibits no distension and no mass. There is no tenderness. There is no rebound and no guarding.   Musculoskeletal: She exhibits no edema.   Neurological: She is alert and oriented to person, place, and time.   Skin: Skin is warm and dry.   Psychiatric: She has a normal mood and affect.       Significant Labs:   BMP: No results for input(s): GLU, NA, K, CL, CO2,  BUN, CREATININE, CALCIUM, MG in the last 48 hours., CMP No results for input(s): NA, K, CL, CO2, GLU, BUN, CREATININE, CALCIUM, PROT, ALBUMIN, BILITOT, ALKPHOS, AST, ALT, ANIONGAP, ESTGFRAFRICA, EGFRNONAA in the last 48 hours., CBC No results for input(s): WBC, HGB, HCT, PLT in the last 48 hours., INR No results for input(s): INR, PROTIME in the last 48 hours., Lipid Panel No results for input(s): CHOL, HDL, LDLCALC, TRIG, CHOLHDL in the last 48 hours.,   Pathology Results  (Last 10 years)    None       and All pertinent lab results from the last 24 hours have been reviewed.    Significant Imaging: CT scan: CT ABDOMEN PELVIS WITH CONTRAST: No results found for this visit on 05/15/20. and CT ABDOMEN PELVIS WITHOUT CONTRAST: No results found for this visit on 05/15/20., Echocardiogram:   2D echo with color flow doppler:   Results for orders placed or performed during the hospital encounter of 04/30/18   Echo doppler color flow   Result Value Ref Range    QEF 65 55 - 65    Aortic Valve Stenosis MODERATE (A)     Est. PA Systolic Pressure 31.3     Mitral Valve Mobility NORMAL     Tricuspid Valve Regurgitation TRIVIAL TO MILD     Narrative    Date of Procedure: 04/30/2018        TEST DESCRIPTION   Technical Quality: This is a technically adequate study.     General: The patient was in an irregularly irregular rhythm throughout the study.     Aorta: The aortic root is normal in size, measuring 2.8 cm at sinotubular junction.     Left Atrium: The left atrium is normal in size, measuring 4.7 cm across in the parasternal view, and 5.7 cm across in the apical view.     Left Ventricle: The left ventricle is normal in size, with an end-diastolic diameter of 3.9 cm, and an end-systolic diameter of 2.9 cm. LV wall thickness is normal, with the septum measuring 1.1 cm and the posterior wall measuring 1.0 cm across. Relative   wall thickness was increased at 0.51, and the LV mass index was 74.7 g/m2 consistent with concentric  remodeling. There are no regional wall motion abnormalities. Left ventricular systolic function appears normal. Visually estimated ejection fraction is   60-65%. The LV Doppler derived stroke volume equals 119.0 ccs.     Diastolic indices: Diastolic function is indeterminate.     Right Atrium: The right atrium is normal in size, measuring 4.7 cm in length in the apical view.     Right Ventricle: The right ventricle is normal in size. Global right ventricular systolic function appears normal. The estimated PA systolic pressure is 31 mmHg.     Aortic Valve:  The aortic valve is moderately sclerotic. The aortic valve is tri-leaflet in structure. The peak velocity obtained across the aortic valve is 3.22 m/s, which translates to a peak gradient of 41 mmHg. The mean gradient is 29 mmHg. Using a   left ventricular outflow tract diameter of 2.0 cm, a left ventricular outflow tract velocity time integral of 38 cm, and a peak instantaneous transvalvular velocity time integral of 80 cm, the calculated aortic valve area is 1.49 cm2(AVAi is 0.75   cm2/m2), consistent with moderate aortic stenosis.     Mitral Valve:  The mitral valve is mildly sclerotic with normal leaflet mobility.     Tricuspid Valve:  The tricuspid valve is mildly sclerotic with normal leaflet mobility. There is trivial to mild tricuspid regurgitation.     Pulmonary Valve:  The pulmonic valve is not well seen.     IVC: IVC is normal in size and collapses > 50% with a sniff, suggesting normal right atrial pressure of 3 mmHg.     Atrial Septum: The atrial septum is intact.     Intracavitary: There is no evidence of pericardial effusion, intracavity mass, thrombi, or vegetation.         CONCLUSIONS     1 - Normal left ventricular systolic function (EF 60-65%).     2 - Indeterminate LV diastolic function.     3 - Moderate aortic stenosis, MISTY = 1.49 cm2, AVAi = 0.75 cm2/m2, peak velocity = 3.22 m/s, mean gradient = 29 mmHg.     4 - The estimated PA systolic  pressure is 31 mmHg.             This document has been electronically    SIGNED BY: Steven Suresh MD On: 05/01/2018 08:10    and Transthoracic echo (TTE) complete (Cupid Only):   Results for orders placed or performed during the hospital encounter of 12/18/19   Echo   Result Value Ref Range    Ascending aorta 2.90 cm    STJ 2.69 cm    AV mean gradient 10 mmHg    Ao peak mazin 1.97 m/s    Ao VTI 42.60 cm    IVS 1.11 (A) 0.6 - 1.1 cm    LA size 4.55 cm    Left Atrium Major Axis 6.10 cm    Left Atrium Minor Axis 6.00 cm    LVIDD 4.84 3.5 - 6.0 cm    LVIDS 3.18 2.1 - 4.0 cm    LVOT diameter 2.47 cm    LVOT peak VTI 32.89 cm    PW 1.15 (A) 0.6 - 1.1 cm    MV Peak E Mazin 1.14 m/s    PV Peak D Mazin 0.81 m/s    PV Peak S Mazin 0.39 m/s    RA Major Axis 5.80 cm    RA Width 3.90 cm    RVDD 3.76 cm    Sinus 2.76 cm    TAPSE 2.02 cm    TR Max Mazin 3.07 m/s    TDI LATERAL 0.09 m/s    TDI SEPTAL 0.07 m/s    LA WIDTH 4.00 cm    LV Diastolic Volume 109.87 mL    LV Systolic Volume 40.47 mL    RV S' 11.17 cm/s    LVOT peak mazin 1.52 m/s    LV LATERAL E/E' RATIO 12.67 m/s    LV SEPTAL E/E' RATIO 16.29 m/s    FS 34 %    LA volume 93.59 cm3    LV mass 204.05 g    Left Ventricle Relative Wall Thickness 0.48 cm    AV valve area 3.70 cm2    AV Velocity Ratio 0.77     AV index (prosthetic) 0.77     Mean e' 0.08 m/s    Pulm vein S/D ratio 0.48     LVOT area 4.8 cm2    LVOT stroke volume 157.52 cm3    AV peak gradient 16 mmHg    E/E' ratio 14.25 m/s    Triscuspid Valve Regurgitation Peak Gradient 38 mmHg    BSA 2.16 m2    LV Systolic Volume Index 19.6 mL/m2    LV Diastolic Volume Index 53.13 mL/m2    LA Volume Index 45.3 mL/m2    LV Mass Index 99 g/m2    Right Atrial Pressure (from IVC) 3 mmHg    TV rest pulmonary artery pressure 41 mmHg    Narrative    · Concentric left ventricular hypertrophy.  · Normal left ventricular systolic function. The estimated ejection   fraction is 60%.  · Normal right ventricular systolic function.  · Left ventricular  diastolic dysfunction.  · Biatrial enlargement.  · There is a 26 mm Phan Suzi S3 transcutaneously-placed aortic   bioprosthesis present. There is no aortic insufficiency present. Mean   gradient = 10mm Hg.  · Small posterior pericardial effusion.  · The estimated PA systolic pressure is 41 mm Hg  · Normal central venous pressure (3 mm Hg).       and X-Ray: CXR: X-Ray Chest 1 View (CXR): No results found for this visit on 05/15/20. and X-Ray Chest PA and Lateral (CXR): No results found for this visit on 05/15/20. and KUB: X-Ray Abdomen AP 1 View (KUB): No results found for this visit on 05/15/20.    Assessment and Plan:     Persistent atrial fibrillation  1. PEDRO for evaluation of FATIMAH prior to DCCV  ASA: 2  Mallampati: 2   EF: 60%    -No absolute contraindications of esophageal stricture, tumor, perforation, laceration,or diverticulum and/or active GI bleed  -The risks, benefits & alternatives of the procedure were explained to the patient.   -The risks of transesophageal echo include but are not limited to:  Dental trauma, esophageal trauma/perforation, bleeding, laryngospasm/brochospasm, aspiration, sore throat/hoarseness, & dislodgement of the endotracheal tube/nasogastric tube (where applicable).    -The risks of moderate sedation include hypotension, respiratory depression, arrhythmias, bronchospasm, & death.    -Informed consent was obtained. The patient is agreeable to proceed with the procedure and all questions and concerns addressed.    Case discussed with an attending in echocardiography lab.     Further recommendations per attending addendum          VTE Risk Mitigation (From admission, onward)    None          Roel Lewis MD  Cardiology   [facility] Stay Cardiac Unit

## 2020-05-22 ENCOUNTER — CLINICAL SUPPORT (OUTPATIENT)
Dept: LAB | Facility: HOSPITAL | Age: 75
End: 2020-05-22
Attending: INTERNAL MEDICINE
Payer: MEDICARE

## 2020-05-22 DIAGNOSIS — I49.8 OTHER SPECIFIED CARDIAC ARRHYTHMIAS: ICD-10-CM

## 2020-05-22 PROCEDURE — 93005 ELECTROCARDIOGRAM TRACING: CPT

## 2020-05-22 PROCEDURE — 93010 RHYTHM STRIP: ICD-10-PCS | Mod: ,,, | Performed by: INTERNAL MEDICINE

## 2020-05-22 PROCEDURE — 93010 ELECTROCARDIOGRAM REPORT: CPT | Mod: ,,, | Performed by: INTERNAL MEDICINE

## 2020-06-25 NOTE — PROGRESS NOTES
"Ms. Strong is a patient of Dr. Bee and was last seen in clinic 12/18/2019.      Subjective:   Patient ID:  Adriana Strong is a 75 y.o. female who presents for follow-up of Atrial Fibrillation  .     HPI:    Ms. Strong is a 75 y.o. female with severe AS s/p TAVR (4/2019), CVA/TIA, AF, DM, HFpEF, HTN here for follow up after cardioversion.     Background:    severe AS, s/p TAVR 4/2019  syncope prior to TAVR  CVA/TIA  AF, without hx of DCCV ever  DM2 on meds  HFpEF on meds  HTN on meds    Had TAVR 2019. No change to QRS, and no heart block.  1 week later: Suffered vagal-sounding syncope (no injury, confusion/washed out for hours afterward, "dehydration" dx at ER).   Sent home with MCOT. This shows AF.    10/19 echo 60%. MISTY 0.8 cm2    ECG is AF at 103 bpm  Syncope - likely vasovagal. Meds reduced.  AF - PEDRO/DCCV and 30-day monitor in near future. If feels better in NSR, will endeavor to maintain NSR -- which may be difficult, including requiring AAD (e.g., amio).    Update (06/29/2020):    2/10/2020: Cardioversion was unsuccessful without restoration of normal sinus rhythm. Cardioversion restored sinus rhythm with early recurrence of atrial fibrillation (ERAF).  Post procedure EKG showed normal sinus rhythm with 1st degree AV block with a ventricular rate of 83 bpm. Sent home with event monitor, which showed AT.    5/15/2020: DCCV. Post procedure  EKG showed  NSR. Plan to continue home medications including Eliquis 5 mg twice daily for CVA prophylaxis  For rhythm control, instructed to start Amiodarone with a Loading dose: Take 400 mg ( 2 tablets ) twice daily for 2 weeks, then reduce to 400 mg ( 2 tablets) once daily  for 2 weeks , then reduce to 200 mg  ( 1 tablet) once daily thereafter.     Today she says she continues to feel episodes of chest pressure and SOB, which worsen during stress. Also experiencing palpitations and heart racing during these episodes. Has dry cough. She denies worsening WHITLEY, LH, " syncope.    She is currently taking eliquis 5mg BID for stroke prophylaxis and denies significant bleeding episodes. She is currently being treated with amiodarone 200mg daily for rhythm control and diltiazem 180mg daily for HR control.  Kidney function is stable, with a creatinine of 0.8 on 5/15/2020. LFTs WNL 5/15/2020. TSH WNL 11/2019. DLCO ratio 10/28/2019 was 80.9.    I have personally reviewed the patient's EKG today, which shows SR with 1st degree AVB at 74bpm. TX interval is 236. QRS is 90. QTc is 461.    Recent Cardiac Tests:    PEDRO (5/15/2020):  · Normal appearing left atrial appendage. No thrombus is present in the appendage.  · Normal left ventricular systolic function. The estimated ejection fraction is 60%.  · Normal right ventricular systolic function.  · There is a 26 mm Phan Suzi transcutaneously-placed aortic bioprosthesis present functioning well. There is no aortic aortic insufficiency present.  · No interatrial septal defect present.  · Grade 4 plaque present.    Current Outpatient Medications   Medication Sig    acetaminophen (TYLENOL ARTHRITIS) 650 MG TbSR Take 1,300 mg by mouth 2 (two) times daily.    albuterol (ACCUNEB) 1.25 mg/3 mL Nebu Take scheduled q4 for the next two days while at home then resume prn dosing    albuterol (VENTOLIN HFA) 90 mcg/actuation inhaler Inhale 2 puffs into the lungs every 6 (six) hours as needed for Wheezing. Rescue    amiodarone (PACERONE) 200 MG Tab Take 400 mg ( 2 tablets ) twice daily for 2 weeks, then reduce to 400 mg ( 2 tablets) once daily  for 2 weeks , then reduce to 200 mg  ( 1 tablet) once daily thereafter.    apixaban (ELIQUIS) 5 mg Tab Take 1 tablet (5 mg total) by mouth 2 (two) times daily.    ascorbic acid (VITAMIN C) 500 MG tablet Take 500 mg by mouth once daily.    azelastine (ASTELIN) 137 mcg nasal spray 1 spray by Nasal route 2 (two) times daily.    bisacodyl (DULCOLAX) 5 mg EC tablet Take 5 mg by mouth daily as needed for  Constipation.    budesonide-formoterol 160-4.5 mcg (SYMBICORT) 160-4.5 mcg/actuation HFAA Inhale 2 puffs into the lungs every 12 (twelve) hours. Controller    calcium carbonate (OS-MICHAEL) 600 mg (1,500 mg) Tab Take 1,200 mg by mouth once daily.    cimetidine (TAGAMET) 200 MG tablet Take 300 mg by mouth.     clopidogrel (PLAVIX) 75 mg tablet Take 1 tablet (75 mg total) by mouth once daily.    cyanocobalamin (VITAMIN B-12) 1000 MCG tablet Take 100 mcg by mouth once daily.    diltiaZEM (CARDIZEM CD) 180 MG 24 hr capsule Take 1 capsule (180 mg total) by mouth once daily.    diphenhydrAMINE (BENADRYL) 25 mg capsule Take 50 mg by mouth.     DULoxetine (CYMBALTA) 20 MG capsule Take 1 capsule (20 mg total) by mouth 2 (two) times daily.    famotidine (PEPCID) 20 MG tablet Take 1 tablet (20 mg total) by mouth every 6 (six) hours. for 3 doses    fexofenadine (ALLEGRA) 60 MG tablet Take 60 mg by mouth every morning.    fish oil-omega-3 fatty acids 300-1,000 mg capsule Take 1 g by mouth once daily.     fluconazole (DIFLUCAN) 150 MG Tab TAKE 1 TABLET (150 MG TOTAL) BY MOUTH ONCE. FOR 1 DOSE    fluticasone (FLONASE) 50 mcg/actuation nasal spray 1 spray by Each Nare route every morning.     furosemide (LASIX) 40 MG tablet TAKE 1 TABLET BY MOUTH EVERY DAY    gabapentin (NEURONTIN) 300 MG capsule Take 2 capsules (600 mg total) by mouth 3 (three) times daily.    isosorbide mononitrate (IMDUR) 30 MG 24 hr tablet Take 1 tablet (30 mg total) by mouth once daily.    lisinopril (PRINIVIL,ZESTRIL) 20 MG tablet Take 1 tablet (20 mg total) by mouth 2 (two) times daily.    lorazepam (ATIVAN) 0.5 MG tablet Take 0.5 mg by mouth every morning.     magnesium 250 mg Tab Take 250 mg by mouth. 3 in am and 3 pm    metFORMIN (GLUCOPHAGE) 500 MG tablet Take 500 mg by mouth 2 (two) times daily.    nitroGLYCERIN (NITROSTAT) 0.4 MG SL tablet Place 1 tablet (0.4 mg total) under the tongue every 5 (five) minutes as needed for Chest pain.  "   nystatin (MYCOSTATIN) powder Apply topically 4 (four) times daily.    ondansetron (ZOFRAN-ODT) 4 MG TbDL DISSOLVE 1 TABLET ON TONGUE EVERY 8 HOURS AS NEEDED FOR NAUSEA    oxybutynin (DITROPAN-XL) 5 MG TR24 Take 5 mg by mouth every other day.     pantoprazole (PROTONIX) 40 MG tablet Take 1 tablet (40 mg total) by mouth once daily. (Patient taking differently: Take 40 mg by mouth every morning. )    POLYSORBATE 80/GLYCERIN (REFRESH DRY EYE THERAPY OPHT) Apply to eye 2 (two) times daily.    potassium gluconate 550 mg (90 mg) Tab Take by mouth. 2 in am and 2 pm    traMADol (ULTRAM) 50 mg tablet Take 1 tablet (50 mg total) by mouth every 6 (six) hours as needed for Pain.    zafirlukast (ACCOLATE) 20 MG tablet Take 20 mg by mouth once daily.     No current facility-administered medications for this visit.      Facility-Administered Medications Ordered in Other Visits   Medication    0.9%  NaCl infusion    sodium chloride 0.9% flush 5 mL     Review of Systems   Constitution: Negative for malaise/fatigue.   Cardiovascular: Positive for chest pain (non-extertional) and palpitations. Negative for dyspnea on exertion, irregular heartbeat and leg swelling.   Respiratory: Positive for cough. Negative for shortness of breath.    Hematologic/Lymphatic: Negative for bleeding problem.   Skin: Negative for rash.   Musculoskeletal: Negative for myalgias.   Gastrointestinal: Negative for hematemesis, hematochezia and nausea.   Genitourinary: Negative for hematuria.   Neurological: Negative for light-headedness.   Psychiatric/Behavioral: Negative for altered mental status.   Allergic/Immunologic: Negative for persistent infections.     Objective:        BP (!) 158/68   Pulse 74   Ht 5' 4" (1.626 m)   LMP 01/21/1973 (Approximate)   BMI 38.79 kg/m²     Physical Exam   Constitutional: She is oriented to person, place, and time. She appears well-developed and well-nourished.   HENT:   Head: Normocephalic.   Nose: Nose " normal.   Eyes: Pupils are equal, round, and reactive to light.   Cardiovascular: Normal rate, regular rhythm, S1 normal and S2 normal.   No murmur heard.  Pulses:       Radial pulses are 2+ on the right side and 2+ on the left side.   Pulmonary/Chest: Breath sounds normal. No respiratory distress.   Abdominal: Normal appearance.   Musculoskeletal: Normal range of motion.         General: No edema.   Neurological: She is alert and oriented to person, place, and time.   Skin: Skin is warm and dry. No erythema.   Psychiatric: She has a normal mood and affect. Her speech is normal and behavior is normal.   Nursing note and vitals reviewed.    Lab Results   Component Value Date     05/15/2020    K 4.3 05/15/2020    MG 1.7 11/27/2019    BUN 16 05/15/2020    CREATININE 0.8 05/15/2020    ALT 28 05/15/2020    AST 38 05/15/2020    HGB 12.8 05/15/2020    HCT 39.8 05/15/2020    HCT 42 11/26/2019    TSH 3.346 06/29/2020    LDLCALC 124 09/28/2016     Recent Labs   Lab 11/26/19  1213 12/18/19  1127 02/03/20  0811 05/15/20  0645   INR 1.1 1.0 1.3 H 1.1       Assessment:     1. Atrial fibrillation, chronic    2. Atrial flutter, unspecified type    3. Essential hypertension    4. Vasovagal syncope    5. Old lacunar stroke without late effect      Plan:     In summary, Ms. Strong is a 75 y.o. female with severe AS s/p TAVR (4/2019), CVA/TIA, AF, DM, HFpEF, HTN here for follow up after cardioversion.   She is now 6 weeks s/p cardioversion and initiation of amiodarone. Initial cardioversion without AAD showed ERAF and AT on event monitor. She is now on amiodarone and continues to report symptoms. Unsure if she feels better in SR (thought she did after 1st cardioversion, not after second). Will obtain another event monitor to evaluate. Will also update TSH. CHADSVASc 7 and she remains on eliquis. BP elevated. She is reporting dry cough and is on lisinopril. Will switch to losartan.     TSH  Event monitor.  Switch lisinopril 20mg  BID to losartan 50mg BID.  Continue medications.  RTC 2 months, sooner if needed.    *A copy of this note has been sent to Dr. Bee*    Follow up in about 2 months (around 8/29/2020).    ------------------------------------------------------------------    SUMIT Lackey, NP-C  Cardiac Electrophysiology

## 2020-06-29 ENCOUNTER — OFFICE VISIT (OUTPATIENT)
Dept: ELECTROPHYSIOLOGY | Facility: CLINIC | Age: 75
End: 2020-06-29
Payer: MEDICARE

## 2020-06-29 ENCOUNTER — HOSPITAL ENCOUNTER (OUTPATIENT)
Dept: CARDIOLOGY | Facility: CLINIC | Age: 75
Discharge: HOME | End: 2020-06-29
Payer: MEDICARE

## 2020-06-29 VITALS
DIASTOLIC BLOOD PRESSURE: 68 MMHG | HEIGHT: 64 IN | SYSTOLIC BLOOD PRESSURE: 158 MMHG | BODY MASS INDEX: 38.79 KG/M2 | HEART RATE: 74 BPM

## 2020-06-29 DIAGNOSIS — R55 VASOVAGAL SYNCOPE: ICD-10-CM

## 2020-06-29 DIAGNOSIS — I48.92 ATRIAL FLUTTER, UNSPECIFIED TYPE: ICD-10-CM

## 2020-06-29 DIAGNOSIS — I49.8 OTHER SPECIFIED CARDIAC ARRHYTHMIAS: ICD-10-CM

## 2020-06-29 DIAGNOSIS — I48.20 ATRIAL FIBRILLATION, CHRONIC: Primary | ICD-10-CM

## 2020-06-29 DIAGNOSIS — I10 ESSENTIAL HYPERTENSION: ICD-10-CM

## 2020-06-29 DIAGNOSIS — Z86.73 OLD LACUNAR STROKE WITHOUT LATE EFFECT: ICD-10-CM

## 2020-06-29 PROCEDURE — 99214 PR OFFICE/OUTPT VISIT, EST, LEVL IV, 30-39 MIN: ICD-10-PCS | Mod: S$GLB,,, | Performed by: NURSE PRACTITIONER

## 2020-06-29 PROCEDURE — 1159F MED LIST DOCD IN RCRD: CPT | Mod: S$GLB,,, | Performed by: NURSE PRACTITIONER

## 2020-06-29 PROCEDURE — 93010 ELECTROCARDIOGRAM REPORT: CPT | Mod: S$GLB,,, | Performed by: INTERNAL MEDICINE

## 2020-06-29 PROCEDURE — 99999 PR PBB SHADOW E&M-EST. PATIENT-LVL V: CPT | Mod: PBBFAC,,, | Performed by: NURSE PRACTITIONER

## 2020-06-29 PROCEDURE — 1159F PR MEDICATION LIST DOCUMENTED IN MEDICAL RECORD: ICD-10-PCS | Mod: S$GLB,,, | Performed by: NURSE PRACTITIONER

## 2020-06-29 PROCEDURE — 93005 ELECTROCARDIOGRAM TRACING: CPT | Mod: S$GLB,,, | Performed by: INTERNAL MEDICINE

## 2020-06-29 PROCEDURE — 3078F DIAST BP <80 MM HG: CPT | Mod: CPTII,S$GLB,, | Performed by: NURSE PRACTITIONER

## 2020-06-29 PROCEDURE — 1101F PR PT FALLS ASSESS DOC 0-1 FALLS W/OUT INJ PAST YR: ICD-10-PCS | Mod: CPTII,S$GLB,, | Performed by: NURSE PRACTITIONER

## 2020-06-29 PROCEDURE — 3077F PR MOST RECENT SYSTOLIC BLOOD PRESSURE >= 140 MM HG: ICD-10-PCS | Mod: CPTII,S$GLB,, | Performed by: NURSE PRACTITIONER

## 2020-06-29 PROCEDURE — 93005 RHYTHM STRIP: ICD-10-PCS | Mod: S$GLB,,, | Performed by: INTERNAL MEDICINE

## 2020-06-29 PROCEDURE — 1101F PT FALLS ASSESS-DOCD LE1/YR: CPT | Mod: CPTII,S$GLB,, | Performed by: NURSE PRACTITIONER

## 2020-06-29 PROCEDURE — 3078F PR MOST RECENT DIASTOLIC BLOOD PRESSURE < 80 MM HG: ICD-10-PCS | Mod: CPTII,S$GLB,, | Performed by: NURSE PRACTITIONER

## 2020-06-29 PROCEDURE — 1126F PR PAIN SEVERITY QUANTIFIED, NO PAIN PRESENT: ICD-10-PCS | Mod: S$GLB,,, | Performed by: NURSE PRACTITIONER

## 2020-06-29 PROCEDURE — 99214 OFFICE O/P EST MOD 30 MIN: CPT | Mod: S$GLB,,, | Performed by: NURSE PRACTITIONER

## 2020-06-29 PROCEDURE — 1126F AMNT PAIN NOTED NONE PRSNT: CPT | Mod: S$GLB,,, | Performed by: NURSE PRACTITIONER

## 2020-06-29 PROCEDURE — 99999 PR PBB SHADOW E&M-EST. PATIENT-LVL V: ICD-10-PCS | Mod: PBBFAC,,, | Performed by: NURSE PRACTITIONER

## 2020-06-29 PROCEDURE — 3077F SYST BP >= 140 MM HG: CPT | Mod: CPTII,S$GLB,, | Performed by: NURSE PRACTITIONER

## 2020-06-29 PROCEDURE — 93010 RHYTHM STRIP: ICD-10-PCS | Mod: S$GLB,,, | Performed by: INTERNAL MEDICINE

## 2020-06-29 RX ORDER — MOMETASONE FUROATE 1 MG/G
CREAM TOPICAL DAILY
Status: ON HOLD | COMMUNITY
End: 2020-10-21 | Stop reason: HOSPADM

## 2020-06-29 RX ORDER — LOSARTAN POTASSIUM 50 MG/1
50 TABLET ORAL 2 TIMES DAILY
Qty: 60 TABLET | Refills: 11 | Status: ON HOLD | OUTPATIENT
Start: 2020-06-29 | End: 2020-10-21 | Stop reason: HOSPADM

## 2020-06-29 NOTE — PATIENT INSTRUCTIONS
Stop lisinopril.  Start losartan 50mg twice daily.  Event monitor. Write down when you are experiencing symptoms.

## 2020-07-06 ENCOUNTER — CLINICAL SUPPORT (OUTPATIENT)
Dept: CARDIOLOGY | Facility: HOSPITAL | Age: 75
End: 2020-07-06
Attending: NURSE PRACTITIONER
Payer: MEDICARE

## 2020-07-06 DIAGNOSIS — I48.20 ATRIAL FIBRILLATION, CHRONIC: ICD-10-CM

## 2020-07-06 PROCEDURE — 93272 ECG/REVIEW INTERPRET ONLY: CPT | Mod: ,,, | Performed by: INTERNAL MEDICINE

## 2020-07-06 PROCEDURE — 93271 ECG/MONITORING AND ANALYSIS: CPT

## 2020-07-06 PROCEDURE — 93272 CARDIAC EVENT MONITOR (CUPID ONLY): ICD-10-PCS | Mod: ,,, | Performed by: INTERNAL MEDICINE

## 2020-07-23 ENCOUNTER — OFFICE VISIT (OUTPATIENT)
Dept: CARDIOLOGY | Facility: CLINIC | Age: 75
End: 2020-07-23
Payer: MEDICARE

## 2020-07-23 VITALS
BODY MASS INDEX: 41.77 KG/M2 | SYSTOLIC BLOOD PRESSURE: 154 MMHG | WEIGHT: 244.69 LBS | HEART RATE: 82 BPM | DIASTOLIC BLOOD PRESSURE: 76 MMHG | HEIGHT: 64 IN

## 2020-07-23 DIAGNOSIS — J45.20 MILD INTERMITTENT ASTHMA WITHOUT COMPLICATION: ICD-10-CM

## 2020-07-23 DIAGNOSIS — I48.19 PERSISTENT ATRIAL FIBRILLATION: ICD-10-CM

## 2020-07-23 DIAGNOSIS — E78.00 PURE HYPERCHOLESTEROLEMIA: ICD-10-CM

## 2020-07-23 DIAGNOSIS — I48.0 PAROXYSMAL ATRIAL FIBRILLATION: ICD-10-CM

## 2020-07-23 DIAGNOSIS — Z95.2 S/P TAVR (TRANSCATHETER AORTIC VALVE REPLACEMENT): Primary | ICD-10-CM

## 2020-07-23 DIAGNOSIS — Z86.73 OLD LACUNAR STROKE WITHOUT LATE EFFECT: ICD-10-CM

## 2020-07-23 DIAGNOSIS — I25.10 CORONARY ARTERY DISEASE INVOLVING NATIVE CORONARY ARTERY OF NATIVE HEART WITHOUT ANGINA PECTORIS: ICD-10-CM

## 2020-07-23 DIAGNOSIS — I10 ESSENTIAL HYPERTENSION: ICD-10-CM

## 2020-07-23 DIAGNOSIS — E11.42 CONTROLLED TYPE 2 DIABETES MELLITUS WITH DIABETIC POLYNEUROPATHY, WITHOUT LONG-TERM CURRENT USE OF INSULIN: ICD-10-CM

## 2020-07-23 DIAGNOSIS — E66.01 MORBID OBESITY: ICD-10-CM

## 2020-07-23 DIAGNOSIS — K55.1 CHRONIC MESENTERIC ISCHEMIA: ICD-10-CM

## 2020-07-23 PROCEDURE — 1159F MED LIST DOCD IN RCRD: CPT | Mod: S$GLB,,, | Performed by: INTERNAL MEDICINE

## 2020-07-23 PROCEDURE — 93005 ELECTROCARDIOGRAM TRACING: CPT | Mod: S$GLB,,, | Performed by: INTERNAL MEDICINE

## 2020-07-23 PROCEDURE — 3077F PR MOST RECENT SYSTOLIC BLOOD PRESSURE >= 140 MM HG: ICD-10-PCS | Mod: CPTII,S$GLB,, | Performed by: INTERNAL MEDICINE

## 2020-07-23 PROCEDURE — 93005 EKG 12-LEAD: ICD-10-PCS | Mod: S$GLB,,, | Performed by: INTERNAL MEDICINE

## 2020-07-23 PROCEDURE — 99214 PR OFFICE/OUTPT VISIT, EST, LEVL IV, 30-39 MIN: ICD-10-PCS | Mod: S$GLB,,, | Performed by: INTERNAL MEDICINE

## 2020-07-23 PROCEDURE — 3078F PR MOST RECENT DIASTOLIC BLOOD PRESSURE < 80 MM HG: ICD-10-PCS | Mod: CPTII,S$GLB,, | Performed by: INTERNAL MEDICINE

## 2020-07-23 PROCEDURE — 99214 OFFICE O/P EST MOD 30 MIN: CPT | Mod: S$GLB,,, | Performed by: INTERNAL MEDICINE

## 2020-07-23 PROCEDURE — 1159F PR MEDICATION LIST DOCUMENTED IN MEDICAL RECORD: ICD-10-PCS | Mod: S$GLB,,, | Performed by: INTERNAL MEDICINE

## 2020-07-23 PROCEDURE — 93010 ELECTROCARDIOGRAM REPORT: CPT | Mod: S$GLB,,, | Performed by: INTERNAL MEDICINE

## 2020-07-23 PROCEDURE — 93010 EKG 12-LEAD: ICD-10-PCS | Mod: S$GLB,,, | Performed by: INTERNAL MEDICINE

## 2020-07-23 PROCEDURE — 99999 PR PBB SHADOW E&M-EST. PATIENT-LVL IV: ICD-10-PCS | Mod: PBBFAC,,, | Performed by: INTERNAL MEDICINE

## 2020-07-23 PROCEDURE — 99999 PR PBB SHADOW E&M-EST. PATIENT-LVL IV: CPT | Mod: PBBFAC,,, | Performed by: INTERNAL MEDICINE

## 2020-07-23 PROCEDURE — 3078F DIAST BP <80 MM HG: CPT | Mod: CPTII,S$GLB,, | Performed by: INTERNAL MEDICINE

## 2020-07-23 PROCEDURE — 3077F SYST BP >= 140 MM HG: CPT | Mod: CPTII,S$GLB,, | Performed by: INTERNAL MEDICINE

## 2020-07-23 PROCEDURE — 1101F PR PT FALLS ASSESS DOC 0-1 FALLS W/OUT INJ PAST YR: ICD-10-PCS | Mod: CPTII,S$GLB,, | Performed by: INTERNAL MEDICINE

## 2020-07-23 PROCEDURE — 1101F PT FALLS ASSESS-DOCD LE1/YR: CPT | Mod: CPTII,S$GLB,, | Performed by: INTERNAL MEDICINE

## 2020-07-23 RX ORDER — ATORVASTATIN CALCIUM 10 MG/1
10 TABLET, FILM COATED ORAL DAILY
Qty: 90 TABLET | Refills: 3 | Status: SHIPPED | OUTPATIENT
Start: 2020-07-23 | End: 2021-03-23 | Stop reason: SDUPTHER

## 2020-07-23 RX ORDER — HYDROCHLOROTHIAZIDE 12.5 MG/1
12.5 CAPSULE ORAL DAILY
Qty: 90 CAPSULE | Refills: 3 | Status: ON HOLD | OUTPATIENT
Start: 2020-07-23 | End: 2020-10-21 | Stop reason: HOSPADM

## 2020-07-23 RX ORDER — CLOBETASOL PROPIONATE 0.46 MG/ML
SOLUTION TOPICAL 2 TIMES DAILY
Status: ON HOLD | COMMUNITY
End: 2020-10-21 | Stop reason: HOSPADM

## 2020-07-23 RX ORDER — TRIAMCINOLONE ACETONIDE 1 MG/G
CREAM TOPICAL 2 TIMES DAILY
Status: ON HOLD | COMMUNITY
End: 2020-10-21 | Stop reason: HOSPADM

## 2020-07-23 RX ORDER — CICLOPIROX 1 G/100ML
SHAMPOO TOPICAL NIGHTLY
Status: ON HOLD | COMMUNITY
End: 2020-10-21 | Stop reason: HOSPADM

## 2020-07-23 NOTE — PROGRESS NOTES
Subjective:      Patient ID: Adriana Strong is a 75 y.o. female.    Chief Complaint: Follow-up    HPI:  Blood pressure has been running high at home    Rides stationary bike twice a day for at least 20 minutes    Legs are weak     Chronically short of breath when walking or climbing stairs.    Pt has little tiny left anterior chest pains which last a minute    Pt has heaviness in the chest which can last for 5 miniutes    The chest pains are not related to exertion    Heart races with activity    Pt had PEDRO cardioversion and is now on amiodarone    Pt is due to see Dr Go  For DM    Review of Systems   Cardiovascular: Positive for chest pain, dyspnea on exertion and palpitations. Negative for claudication, irregular heartbeat, leg swelling, near-syncope, orthopnea and syncope.        Past Medical History:   Diagnosis Date    Acute on chronic diastolic (congestive) heart failure 11/20/2019    Anticoagulant long-term use     on Plavix since May 2015    Anxiety     Arthritis     Asthma     Atrial fibrillation     Cataracts, bilateral     Chest pain, atypical     Colon polyp     Coronary artery disease     Diabetes mellitus     Dry eyes     Esophageal erosions     GERD (gastroesophageal reflux disease)     Heart murmur     Hemorrhoid     High cholesterol     Hypertension     Irritable bowel syndrome     Shingles 2015    Stenosis of aortic and mitral valves     Stroke     TIA (transient ischemic attack)     Use of cane as ambulatory aid         Past Surgical History:   Procedure Laterality Date    ABDOMINAL SURGERY      stents to SMA    angiocele      2007, 2014    AORTIC VALVE REPLACEMENT N/A 11/19/2019    Procedure: Replacement-valve-aortic;  Surgeon: Jack Capone MD;  Location: [facility] CATH LAB;  Service: Cardiology;  Laterality: N/A;    BREAST SURGERY      left--- a lump--- no cancer    CARDIAC VALVE SURGERY      COLONOSCOPY  2014    COLONOSCOPY N/A 3/14/2018    Procedure:  COLONOSCOPY;  Surgeon: Efren Kemp MD;  Location: [facility] ENDO (4TH FLR);  Service: Endoscopy;  Laterality: N/A;  ok to hold Plavix 5 days prior to procedure per Dr RESHMA Hernandez     ok per Dr Kemp to hold Eliquis 2 days prior to procedure (see telephone encounter dated-2/7/18)    HERNIA REPAIR      HYSTERECTOMY  partial    1982 partial hysterectomy    LEFT HEART CATHETERIZATION Right 11/5/2019    Procedure: Left heart cath;  Surgeon: Jack Capone MD;  Location: [facility] CATH LAB;  Service: Cardiology;  Laterality: Right;    left nasal polyp      left neck lymph node      nose polyp      right hip fatty mass tissue      stent to small intestine      SUPERIOR VENA CAVA ANGIOPLASTY / STENTING      TONSILLECTOMY      TOTAL ABDOMINAL HYSTERECTOMY      2014    TOTAL KNEE ARTHROPLASTY Bilateral     TREATMENT OF CARDIAC ARRHYTHMIA N/A 2/10/2020    Procedure: CARDIOVERSION;  Surgeon: Jayy Parrish MD;  Location: [facility] EP LAB;  Service: Cardiology;  Laterality: N/A;  AF, PEDRO, DCCV, MAC, DM, 3 Prep    TREATMENT OF CARDIAC ARRHYTHMIA N/A 5/15/2020    Procedure: CARDIOVERSION;  Surgeon: Hany Bee MD;  Location: [facility] EP LAB;  Service: Cardiology;  Laterality: N/A;  AF, PEDRO, DCCV, MAC, DM, 3 Prep    UPPER GASTROINTESTINAL ENDOSCOPY  2014       Family History   Problem Relation Age of Onset    Colon cancer Neg Hx     Inflammatory bowel disease Neg Hx        Social History     Socioeconomic History    Marital status:      Spouse name: Not on file    Number of children: Not on file    Years of education: Not on file    Highest education level: Not on file   Occupational History    Not on file   Social Needs    Financial resource strain: Not on file    Food insecurity     Worry: Not on file     Inability: Not on file    Transportation needs     Medical: Not on file     Non-medical: Not on file   Tobacco Use    Smoking status: Never Smoker    Smokeless tobacco: Never Used   Substance and Sexual Activity     Alcohol use: No    Drug use: No    Sexual activity: Not on file   Lifestyle    Physical activity     Days per week: Not on file     Minutes per session: Not on file    Stress: Not on file   Relationships    Social connections     Talks on phone: Not on file     Gets together: Not on file     Attends Orthodoxy service: Not on file     Active member of club or organization: Not on file     Attends meetings of clubs or organizations: Not on file     Relationship status: Not on file   Other Topics Concern    Not on file   Social History Narrative    Not on file       Current Outpatient Medications on File Prior to Visit   Medication Sig Dispense Refill    acetaminophen (TYLENOL ARTHRITIS) 650 MG TbSR Take 1,300 mg by mouth 2 (two) times daily.      albuterol (ACCUNEB) 1.25 mg/3 mL Nebu Take scheduled q4 for the next two days while at home then resume prn dosing 3 mL 1    albuterol (VENTOLIN HFA) 90 mcg/actuation inhaler Inhale 2 puffs into the lungs every 6 (six) hours as needed for Wheezing. Rescue      amiodarone (PACERONE) 200 MG Tab Take 400 mg ( 2 tablets ) twice daily for 2 weeks, then reduce to 400 mg ( 2 tablets) once daily  for 2 weeks , then reduce to 200 mg  ( 1 tablet) once daily thereafter. 90 tablet 5    ascorbic acid (VITAMIN C) 500 MG tablet Take 500 mg by mouth once daily.      azelastine (ASTELIN) 137 mcg nasal spray 1 spray by Nasal route 2 (two) times daily.      bisacodyl (DULCOLAX) 5 mg EC tablet Take 5 mg by mouth daily as needed for Constipation.      budesonide-formoterol 160-4.5 mcg (SYMBICORT) 160-4.5 mcg/actuation HFAA Inhale 2 puffs into the lungs every 12 (twelve) hours. Controller      calcium carbonate (OS-MICHAEL) 600 mg (1,500 mg) Tab Take 1,200 mg by mouth once daily.      ciclopirox 1 % shampoo Apply topically every evening.      cimetidine (TAGAMET) 200 MG tablet Take 300 mg by mouth.       clobetasoL (TEMOVATE) 0.05 % external solution Apply topically 2 (two)  times daily.      clopidogrel (PLAVIX) 75 mg tablet Take 1 tablet (75 mg total) by mouth once daily. 90 tablet 3    cyanocobalamin (VITAMIN B-12) 1000 MCG tablet Take 100 mcg by mouth once daily.      diltiaZEM (CARDIZEM CD) 180 MG 24 hr capsule Take 1 capsule (180 mg total) by mouth once daily. 90 capsule 3    DULoxetine (CYMBALTA) 20 MG capsule TAKE 1 CAPSULE BY MOUTH EVERY DAY 90 capsule 3    fexofenadine (ALLEGRA) 60 MG tablet Take 60 mg by mouth every morning.      fish oil-omega-3 fatty acids 300-1,000 mg capsule Take 1 g by mouth once daily.       fluconazole (DIFLUCAN) 150 MG Tab TAKE 1 TABLET (150 MG TOTAL) BY MOUTH ONCE. FOR 1 DOSE  0    fluticasone (FLONASE) 50 mcg/actuation nasal spray 1 spray by Each Nare route every morning.       furosemide (LASIX) 40 MG tablet TAKE 1 TABLET BY MOUTH EVERY DAY 90 tablet 3    gabapentin (NEURONTIN) 300 MG capsule Take 2 capsules (600 mg total) by mouth 3 (three) times daily. 540 capsule 3    isosorbide mononitrate (IMDUR) 30 MG 24 hr tablet Take 1 tablet (30 mg total) by mouth once daily. 30 tablet 11    lorazepam (ATIVAN) 0.5 MG tablet Take 0.5 mg by mouth every morning.       losartan (COZAAR) 50 MG tablet Take 1 tablet (50 mg total) by mouth 2 (two) times daily. 60 tablet 11    magnesium 250 mg Tab Take 250 mg by mouth. 3 in am and 3 pm      metFORMIN (GLUCOPHAGE) 500 MG tablet Take 500 mg by mouth 2 (two) times daily.  1    mometasone 0.1% (ELOCON) 0.1 % cream Apply topically once daily.      nitroGLYCERIN (NITROSTAT) 0.4 MG SL tablet Place 1 tablet (0.4 mg total) under the tongue every 5 (five) minutes as needed for Chest pain. 30 tablet 11    nystatin (MYCOSTATIN) powder Apply topically 4 (four) times daily. 1 Bottle 0    ondansetron (ZOFRAN-ODT) 4 MG TbDL DISSOLVE 1 TABLET ON TONGUE EVERY 8 HOURS AS NEEDED FOR NAUSEA  1    oxybutynin (DITROPAN-XL) 5 MG TR24 Take 5 mg by mouth every other day.       pantoprazole (PROTONIX) 40 MG tablet Take 1  tablet (40 mg total) by mouth once daily. (Patient taking differently: Take 40 mg by mouth every morning. ) 90 tablet 3    POLYSORBATE 80/GLYCERIN (REFRESH DRY EYE THERAPY OPHT) Apply to eye 2 (two) times daily.      potassium gluconate 550 mg (90 mg) Tab Take by mouth. 2 in am and 2 pm      traMADol (ULTRAM) 50 mg tablet Take 1 tablet (50 mg total) by mouth every 6 (six) hours as needed for Pain. 20 tablet 0    triamcinolone acetonide 0.1% (KENALOG) 0.1 % cream Apply topically 2 (two) times daily.      zafirlukast (ACCOLATE) 20 MG tablet Take 20 mg by mouth once daily.  3    [DISCONTINUED] apixaban (ELIQUIS) 5 mg Tab Take 1 tablet (5 mg total) by mouth 2 (two) times daily. 180 tablet 3    [DISCONTINUED] diphenhydrAMINE (BENADRYL) 25 mg capsule Take 50 mg by mouth.       [DISCONTINUED] famotidine (PEPCID) 20 MG tablet Take 1 tablet (20 mg total) by mouth every 6 (six) hours. for 3 doses (Patient not taking: Reported on 6/29/2020) 3 tablet 0     Current Facility-Administered Medications on File Prior to Visit   Medication Dose Route Frequency Provider Last Rate Last Dose    0.9%  NaCl infusion   Intravenous Continuous Lynette Hightower NP        sodium chloride 0.9% flush 5 mL  5 mL Intravenous PRN Lynette Hightower NP           Review of patient's allergies indicates:   Allergen Reactions    Celebrex [celecoxib] Shortness Of Breath    Ciprofloxacin Swelling     lip    Dicyclomine Hives    Adhesive Dermatitis    Avelox [moxifloxacin] Swelling    Cilostazol Other (See Comments)     Elevates blood pressure    Eryc [erythromycin] Other (See Comments)     unknown    Iodine and iodide containing products Hives    Keflex [cephalexin] Hives    Meclomen      rashes    Meloxicam      Ear ringing    Metoclopramide Other (See Comments)     High blood pressure    Sulfa (sulfonamide antibiotics) Itching     Objective:     Vitals:    07/23/20 1424   BP: (!) 154/76   BP Location: Left arm   Patient  "Position: Sitting   BP Method: Large (Automatic)   Pulse: 82   Weight: 111 kg (244 lb 11.4 oz)   Height: 5' 4" (1.626 m)        Physical Exam   Constitutional: She is oriented to person, place, and time. She appears well-developed and well-nourished.   Eyes: No scleral icterus.   Neck: No JVD present. Carotid bruit is present (bilateral bruits vs transmitted murmur).   Cardiovascular: Normal rate and regular rhythm. Exam reveals no gallop.   Murmur (III/VI systolic murmur radiates to the neck) heard.  Pulmonary/Chest: Breath sounds normal.   Musculoskeletal:         General: Edema (trace pitting pedal edema bilaterally) present.   Neurological: She is alert and oriented to person, place, and time.   Skin: Skin is warm and dry.   Psychiatric: She has a normal mood and affect. Her behavior is normal. Judgment and thought content normal.   Vitals reviewed.   wt up 19 lbs over past 7 months      Note carotid ultrasound 2018 showed no significant stenosis    ECG today: NSR with first degree AV block and PVC, possible septal scar, nonspecific ST sagging    Note PEDRO 5/20 showed normal LVEF and adequate function of TAVR    Note pt had a tubular LAD stenosis of about 790% prior to TAVR    Lab 5/15/20: CBC and BMP WNL except glu 135     in January with Dr Go      Assessment:     1. S/P TAVR (transcatheter aortic valve replacement)    2. Persistent atrial fibrillation    3. Pure hypercholesterolemia    4. Essential hypertension    5. Controlled type 2 diabetes mellitus with diabetic polyneuropathy, without long-term current use of insulin    6. BMI 38.0-38.9,adult    7. Morbid obesity    8. Chronic mesenteric ischemia    9. Old lacunar stroke without late effect    10. Mild intermittent asthma without complication    11. Paroxysmal atrial fibrillation    12. Coronary artery disease involving native coronary artery of native heart without angina pectoris      Plan:   Adriana was seen today for " follow-up.    Diagnoses and all orders for this visit:    S/P TAVR (transcatheter aortic valve replacement)    Persistent atrial fibrillation  -     apixaban (ELIQUIS) 5 mg Tab; Take 1 tablet (5 mg total) by mouth 2 (two) times daily.  -     IN OFFICE EKG 12-LEAD (to [place])    Pure hypercholesterolemia    Essential hypertension    Controlled type 2 diabetes mellitus with diabetic polyneuropathy, without long-term current use of insulin    BMI 38.0-38.9,adult    Morbid obesity    Chronic mesenteric ischemia    Old lacunar stroke without late effect    Mild intermittent asthma without complication    Paroxysmal atrial fibrillation  -     IN OFFICE EKG 12-LEAD (to [place])    Coronary artery disease involving native coronary artery of native heart without angina pectoris    Other orders  -     hydroCHLOROthiazide (MICROZIDE) 12.5 mg capsule; Take 1 capsule (12.5 mg total) by mouth once daily.     Low carb diet discussed in detail    Add HCTZ 12.5 mg daily for HBP and edema    Will add atorvastatin 10 mg a day for HLD    Rest of meds the same    Follow up in about 4 weeks (around 8/20/2020).

## 2020-08-25 ENCOUNTER — LAB VISIT (OUTPATIENT)
Dept: LAB | Facility: HOSPITAL | Age: 75
End: 2020-08-25
Attending: INTERNAL MEDICINE
Payer: MEDICARE

## 2020-08-25 ENCOUNTER — TELEPHONE (OUTPATIENT)
Dept: CARDIOLOGY | Facility: CLINIC | Age: 75
End: 2020-08-25

## 2020-08-25 DIAGNOSIS — I48.19 PERSISTENT ATRIAL FIBRILLATION: ICD-10-CM

## 2020-08-25 DIAGNOSIS — E78.00 PURE HYPERCHOLESTEROLEMIA: ICD-10-CM

## 2020-08-25 DIAGNOSIS — I10 ESSENTIAL HYPERTENSION: ICD-10-CM

## 2020-08-25 LAB
ALBUMIN SERPL BCP-MCNC: 4.5 G/DL (ref 3.5–5.2)
ALP SERPL-CCNC: 49 U/L (ref 38–126)
ALT SERPL W/O P-5'-P-CCNC: 31 U/L (ref 10–44)
ANION GAP SERPL CALC-SCNC: 8 MMOL/L (ref 8–16)
AST SERPL-CCNC: 36 U/L (ref 15–46)
BASOPHILS # BLD AUTO: 0.05 K/UL (ref 0–0.2)
BASOPHILS NFR BLD: 0.6 % (ref 0–1.9)
BILIRUB SERPL-MCNC: 0.3 MG/DL (ref 0.1–1)
BUN SERPL-MCNC: 21 MG/DL (ref 7–17)
CALCIUM SERPL-MCNC: 9.2 MG/DL (ref 8.7–10.5)
CHLORIDE SERPL-SCNC: 92 MMOL/L (ref 95–110)
CHOLEST SERPL-MCNC: 161 MG/DL (ref 120–199)
CHOLEST/HDLC SERPL: 2.1 {RATIO} (ref 2–5)
CK SERPL-CCNC: 55 U/L (ref 55–170)
CO2 SERPL-SCNC: 34 MMOL/L (ref 23–29)
CREAT SERPL-MCNC: 0.78 MG/DL (ref 0.5–1.4)
DIFFERENTIAL METHOD: NORMAL
EOSINOPHIL # BLD AUTO: 0 K/UL (ref 0–0.5)
EOSINOPHIL NFR BLD: 0.3 % (ref 0–8)
ERYTHROCYTE [DISTWIDTH] IN BLOOD BY AUTOMATED COUNT: 14.5 % (ref 11.5–14.5)
EST. GFR  (AFRICAN AMERICAN): >60 ML/MIN/1.73 M^2
EST. GFR  (NON AFRICAN AMERICAN): >60 ML/MIN/1.73 M^2
GLUCOSE SERPL-MCNC: 109 MG/DL (ref 70–110)
HCT VFR BLD AUTO: 41 % (ref 37–48.5)
HDLC SERPL-MCNC: 75 MG/DL (ref 40–75)
HDLC SERPL: 46.6 % (ref 20–50)
HGB BLD-MCNC: 13.4 G/DL (ref 12–16)
IMM GRANULOCYTES # BLD AUTO: 0.03 K/UL (ref 0–0.04)
IMM GRANULOCYTES NFR BLD AUTO: 0.4 % (ref 0–0.5)
LDLC SERPL CALC-MCNC: 75.2 MG/DL (ref 63–159)
LYMPHOCYTES # BLD AUTO: 2.8 K/UL (ref 1–4.8)
LYMPHOCYTES NFR BLD: 36.3 % (ref 18–48)
MCH RBC QN AUTO: 30.8 PG (ref 27–31)
MCHC RBC AUTO-ENTMCNC: 32.7 G/DL (ref 32–36)
MCV RBC AUTO: 94 FL (ref 82–98)
MONOCYTES # BLD AUTO: 0.6 K/UL (ref 0.3–1)
MONOCYTES NFR BLD: 7.8 % (ref 4–15)
NEUTROPHILS # BLD AUTO: 4.3 K/UL (ref 1.8–7.7)
NEUTROPHILS NFR BLD: 54.6 % (ref 38–73)
NONHDLC SERPL-MCNC: 86 MG/DL
NRBC BLD-RTO: 0 /100 WBC
NT-PROBNP: 657 PG/ML (ref 5–1800)
PLATELET # BLD AUTO: 268 K/UL (ref 150–350)
PMV BLD AUTO: 9.5 FL (ref 9.2–12.9)
POTASSIUM SERPL-SCNC: 4.8 MMOL/L (ref 3.5–5.1)
PROT SERPL-MCNC: 7.9 G/DL (ref 6–8.4)
RBC # BLD AUTO: 4.35 M/UL (ref 4–5.4)
SODIUM SERPL-SCNC: 134 MMOL/L (ref 136–145)
T4 FREE SERPL-MCNC: 0.96 NG/DL (ref 0.71–1.51)
TRIGL SERPL-MCNC: 54 MG/DL (ref 30–150)
TSH SERPL DL<=0.005 MIU/L-ACNC: 4.18 UIU/ML (ref 0.4–4)
WBC # BLD AUTO: 7.79 K/UL (ref 3.9–12.7)

## 2020-08-25 PROCEDURE — 84439 ASSAY OF FREE THYROXINE: CPT

## 2020-08-25 PROCEDURE — 82550 ASSAY OF CK (CPK): CPT | Mod: PO

## 2020-08-25 PROCEDURE — 83880 ASSAY OF NATRIURETIC PEPTIDE: CPT | Mod: PO

## 2020-08-25 PROCEDURE — 36415 COLL VENOUS BLD VENIPUNCTURE: CPT | Mod: PO

## 2020-08-25 PROCEDURE — 80053 COMPREHEN METABOLIC PANEL: CPT | Mod: PO

## 2020-08-25 PROCEDURE — 80061 LIPID PANEL: CPT

## 2020-08-25 PROCEDURE — 85025 COMPLETE CBC W/AUTO DIFF WBC: CPT | Mod: PO

## 2020-08-25 PROCEDURE — 84443 ASSAY THYROID STIM HORMONE: CPT | Mod: PO

## 2020-08-25 NOTE — PROGRESS NOTES
"Ms. Strong is a patient of Dr. Bee and was last seen in clinic 6/29/2020.      Subjective:   Patient ID:  Adriana Strong is a 75 y.o. female who presents for follow-up of Atrial Fibrillation  .     HPI:    Ms. Strong is a 75 y.o. female with severe AS s/p TAVR (4/2019), CVA/TIA, AF, DM, HFpEF, HTN here for follow up.     Background:    severe AS, s/p TAVR 4/2019  syncope prior to TAVR  CVA/TIA  AF, without hx of DCCV ever  DM2 on meds  HFpEF on meds  HTN on meds    Had TAVR 2019. No change to QRS, and no heart block.  1 week later: Suffered vagal-sounding syncope (no injury, confusion/washed out for hours afterward, "dehydration" dx at ER).   Sent home with MCOT. This shows AF.    10/19 echo 60%. MISTY 0.8 cm2    ECG is AF at 103 bpm  Syncope - likely vasovagal. Meds reduced.  AF - PEDRO/DCCV and 30-day monitor in near future. If feels better in NSR, will endeavor to maintain NSR -- which may be difficult, including requiring AAD (e.g., amio).    2/10/2020: Cardioversion was unsuccessful without restoration of normal sinus rhythm. Cardioversion restored sinus rhythm with early recurrence of atrial fibrillation (ERAF).  Post procedure EKG showed normal sinus rhythm with 1st degree AV block with a ventricular rate of 83 bpm. Sent home with event monitor, which showed AT.    5/15/2020: DCCV. Post procedure  EKG showed  NSR. Plan to continue home medications including Eliquis 5 mg twice daily for CVA prophylaxis  For rhythm control, instructed to start Amiodarone with a Loading dose: Take 400 mg ( 2 tablets ) twice daily for 2 weeks, then reduce to 400 mg ( 2 tablets) once daily  for 2 weeks , then reduce to 200 mg  ( 1 tablet) once daily thereafter.      Continued to feel palpitations at clinic follow up 6/29/2020 despite amiodarone. Event monitor ordered.  Increased losartan for BP control.     Update (09/01/2020):    Event monitor 7/6/2020: At baseline, in the absence of symptoms, the rhythm was sinus with a heart rate " of 68 beats per minute.  There were multiple routine tests over the course of the month, each of which showed sinus rhythm with rates ranging in the 60s to 80s.  No symptom was reported.  Impression:  Normal sinus rhythm.  Negative event monitor.    Today she she complains of continued WHITLEY and chest pressure (both at rest and with exertion). Note: [facility] (11/5/2019 by Dr Capone)- 70% tubular long mid LAD lesion with small distal LAD vessel, plan to treat medically giving that PCI may risk jailing a good size Diagonal branch. She says she had these symptoms while wearing her event monitor.     She is currently taking eliquis 5mg BID for stroke prophylaxis and denies significant bleeding episodes. She is currently being treated with amiodarone 200mg daily for rhythm control and diltiazem 180mg daily for HR control.  Kidney function is stable, with a creatinine of 0.7 on 8/25/2020. LFTs WNL 8/25/2020. TSH mildly elevated with normal free T4 8/25/2020. DLCO ratio 10/28/2019 was 80.9.    I have personally reviewed the patient's EKG today, which shows AFL at 72bpm.    Recent Cardiac Tests:    PEDRO (5/15/2020):  · Normal appearing left atrial appendage. No thrombus is present in the appendage.  · Normal left ventricular systolic function. The estimated ejection fraction is 60%.  · Normal right ventricular systolic function.  · There is a 26 mm Phan Suzi transcutaneously-placed aortic bioprosthesis present functioning well. There is no aortic aortic insufficiency present.  · No interatrial septal defect present.  · Grade 4 plaque present.    Current Outpatient Medications   Medication Sig    acetaminophen (TYLENOL ARTHRITIS) 650 MG TbSR Take 1,300 mg by mouth 2 (two) times daily.    albuterol (ACCUNEB) 1.25 mg/3 mL Nebu Take scheduled q4 for the next two days while at home then resume prn dosing    albuterol (VENTOLIN HFA) 90 mcg/actuation inhaler Inhale 2 puffs into the lungs every 6 (six) hours as needed for Wheezing.  Rescue    apixaban (ELIQUIS) 5 mg Tab Take 1 tablet (5 mg total) by mouth 2 (two) times daily.    ascorbic acid (VITAMIN C) 500 MG tablet Take 1,000 mg by mouth once daily.     atorvastatin (LIPITOR) 10 MG tablet Take 1 tablet (10 mg total) by mouth once daily.    azelastine (ASTELIN) 137 mcg nasal spray 1 spray by Nasal route 2 (two) times daily.    bisacodyl (DULCOLAX) 5 mg EC tablet Take 5 mg by mouth daily as needed for Constipation.    budesonide-formoterol 160-4.5 mcg (SYMBICORT) 160-4.5 mcg/actuation HFAA Inhale 2 puffs into the lungs every 12 (twelve) hours. Controller    calcium carbonate (OS-MICHAEL) 600 mg (1,500 mg) Tab Take 1,200 mg by mouth once daily.    calcium carbonate/vitamin D3 (CALTRATE 600 + D ORAL) Take by mouth.    ciclopirox 1 % shampoo Apply topically every evening.    cimetidine (TAGAMET) 200 MG tablet Take 300 mg by mouth.     clobetasoL (TEMOVATE) 0.05 % external solution Apply topically 2 (two) times daily.    clopidogrel (PLAVIX) 75 mg tablet Take 1 tablet (75 mg total) by mouth once daily.    cyanocobalamin (VITAMIN B-12) 1000 MCG tablet Take 100 mcg by mouth once daily.    diltiaZEM (CARDIZEM CD) 180 MG 24 hr capsule Take 1 capsule (180 mg total) by mouth once daily.    docusate sodium (COLACE) 100 MG capsule Take 100 mg by mouth 2 (two) times daily.    DULoxetine (CYMBALTA) 20 MG capsule TAKE 1 CAPSULE BY MOUTH EVERY DAY    fexofenadine (ALLEGRA) 60 MG tablet Take 180 mg by mouth every morning.     fish oil-omega-3 fatty acids 300-1,000 mg capsule Take 1 g by mouth once daily.     fluconazole (DIFLUCAN) 150 MG Tab TAKE 1 TABLET (150 MG TOTAL) BY MOUTH ONCE. FOR 1 DOSE    fluticasone (FLONASE) 50 mcg/actuation nasal spray 1 spray by Each Nare route every morning.     folic acid/multivit-min/lutein (CENTRUM SILVER ORAL) Take by mouth.    furosemide (LASIX) 40 MG tablet TAKE 1 TABLET BY MOUTH EVERY DAY    gabapentin (NEURONTIN) 300 MG capsule Take 2 capsules (600 mg  total) by mouth 3 (three) times daily.    hydroCHLOROthiazide (MICROZIDE) 12.5 mg capsule Take 1 capsule (12.5 mg total) by mouth once daily.    isosorbide mononitrate (IMDUR) 30 MG 24 hr tablet Take 1 tablet (30 mg total) by mouth once daily.    lisinopriL (PRINIVIL,ZESTRIL) 20 MG tablet Take 20 mg by mouth once daily.    lorazepam (ATIVAN) 0.5 MG tablet Take 0.5 mg by mouth every morning.     magnesium 250 mg Tab Take 250 mg by mouth. 3 in am and 3 pm    metFORMIN (GLUCOPHAGE) 500 MG tablet Take 500 mg by mouth 2 (two) times daily.    mometasone 0.1% (ELOCON) 0.1 % cream Apply topically once daily.    montelukast (SINGULAIR) 10 mg tablet Take 10 mg by mouth every evening.    nitroGLYCERIN (NITROSTAT) 0.4 MG SL tablet Place 1 tablet (0.4 mg total) under the tongue every 5 (five) minutes as needed for Chest pain.    nystatin (MYCOSTATIN) powder Apply topically 4 (four) times daily.    ondansetron (ZOFRAN-ODT) 4 MG TbDL DISSOLVE 1 TABLET ON TONGUE EVERY 8 HOURS AS NEEDED FOR NAUSEA    oxybutynin (DITROPAN-XL) 5 MG TR24 Take 5 mg by mouth every other day.     pantoprazole (PROTONIX) 40 MG tablet Take 1 tablet (40 mg total) by mouth once daily. (Patient taking differently: Take 40 mg by mouth every morning. )    polyethylene glycol (GLYCOLAX) 17 gram PwPk Take by mouth.    POLYSORBATE 80/GLYCERIN (REFRESH DRY EYE THERAPY OPHT) Apply to eye 2 (two) times daily.    potassium gluconate 550 mg (90 mg) Tab Take by mouth. 2 in am and 2 pm    traMADol (ULTRAM) 50 mg tablet Take 1 tablet (50 mg total) by mouth every 6 (six) hours as needed for Pain.    triamcinolone acetonide 0.1% (KENALOG) 0.1 % cream Apply topically 2 (two) times daily.    vitamin D (VITAMIN D3) 1000 units Tab Take 2,000 Units by mouth once daily.    zafirlukast (ACCOLATE) 20 MG tablet Take 20 mg by mouth once daily.    losartan (COZAAR) 50 MG tablet Take 1 tablet (50 mg total) by mouth 2 (two) times daily. (Patient not taking: Reported  "on 9/1/2020)     No current facility-administered medications for this visit.      Facility-Administered Medications Ordered in Other Visits   Medication    0.9%  NaCl infusion    sodium chloride 0.9% flush 5 mL       Review of Systems   Constitution: Positive for malaise/fatigue.   Cardiovascular: Positive for chest pain and dyspnea on exertion. Negative for irregular heartbeat, leg swelling and palpitations.   Respiratory: Negative for shortness of breath.    Hematologic/Lymphatic: Negative for bleeding problem.   Skin: Negative for rash.   Musculoskeletal: Negative for myalgias.   Gastrointestinal: Negative for hematemesis, hematochezia and nausea.   Genitourinary: Negative for hematuria.   Neurological: Negative for light-headedness.   Psychiatric/Behavioral: Negative for altered mental status.   Allergic/Immunologic: Negative for persistent infections.     Objective:        BP (!) 159/72   Pulse 77   Ht 5' 4" (1.626 m)   Wt 112 kg (247 lb)   LMP 01/21/1973 (Approximate)   BMI 42.40 kg/m²     Physical Exam   Constitutional: She is oriented to person, place, and time. She appears well-developed and well-nourished.   HENT:   Head: Normocephalic.   Nose: Nose normal.   Eyes: Pupils are equal, round, and reactive to light.   Cardiovascular: Normal rate, S1 normal and S2 normal. An irregularly irregular rhythm present.   No murmur heard.  Pulses:       Radial pulses are 2+ on the right side and 2+ on the left side.   Pulmonary/Chest: Breath sounds normal. No respiratory distress.   Abdominal: Normal appearance.   Musculoskeletal: Normal range of motion.         General: No edema.   Neurological: She is alert and oriented to person, place, and time.   Skin: Skin is warm and dry. No erythema.   Psychiatric: She has a normal mood and affect. Her speech is normal and behavior is normal.   Nursing note and vitals reviewed.    Lab Results   Component Value Date     (L) 08/25/2020    K 4.8 08/25/2020    MG 1.7 " 11/27/2019    BUN 21 (H) 08/25/2020    CREATININE 0.78 08/25/2020    ALT 31 08/25/2020    AST 36 08/25/2020    HGB 13.4 08/25/2020    HCT 41.0 08/25/2020    HCT 42 11/26/2019    TSH 4.180 (H) 08/25/2020    LDLCALC 75.2 08/25/2020       Recent Labs   Lab 11/26/19  1213 12/18/19  1127 02/03/20  0811 05/15/20  0645   INR 1.1 1.0 1.3 H 1.1       Assessment:     1. Atrial fibrillation, chronic    2. Essential hypertension    3. Persistent atrial fibrillation    4. Vasovagal syncope    5. Atrial flutter, unspecified type      Plan:     In summary, Ms. Strong is a 75 y.o. female with severe AS s/p TAVR (4/2019), CVA/TIA, AF, DM, HFpEF, HTN here for follow up.   She is back out of rhythm despite amiodarone. During the time she was wearing her event monitor, her symptoms continued. The was in SR throughout that time. She did not note a difference when she reverted out of SR. Case discussed with Dr. Bee. Will continue with rate control strategy at this point. Her BP is also poorly controlled. Will increase diltiazem for rate and BP control. She remains on eliquis for CVA prophylaxis.    Stop amiodarone.  Increase dilt 180->240  Holter in 3 mo. to evaluate rate  RTC 6 mo, sooner if needed.    *A copy of this note has been sent to Dr. Bee*    Follow up in about 6 months (around 3/1/2021).    ------------------------------------------------------------------    SUMIT Lackey, NP-C  Cardiac Electrophysiology

## 2020-08-25 NOTE — TELEPHONE ENCOUNTER
Discussed with pt.  Na is a little low. This may be a side effect of HCTZ.  Pt instructed to cut back on fluid intake. Will consider discontinuing the HCTZ ans increasing the furosemide to bid.  TSH is minimally abnormal.  T4 is pending. Will refer pt to PCP, Dr Krzysztof Mcgraw.  Lipids OK.  Pt has an appt next week to see me

## 2020-08-26 ENCOUNTER — TELEPHONE (OUTPATIENT)
Dept: CARDIOLOGY | Facility: CLINIC | Age: 75
End: 2020-08-26

## 2020-08-26 NOTE — TELEPHONE ENCOUNTER
Notified patient that her labs was sent to Dr Mcgraw her PCP.        ----- Message from Johnny Saravia sent at 8/26/2020  8:23 AM CDT -----  Patient would like her test results sent to her PCP. Please call and advise.

## 2020-08-31 DIAGNOSIS — I49.8 OTHER SPECIFIED CARDIAC ARRHYTHMIAS: Primary | ICD-10-CM

## 2020-09-01 ENCOUNTER — HOSPITAL ENCOUNTER (OUTPATIENT)
Dept: CARDIOLOGY | Facility: CLINIC | Age: 75
Discharge: HOME | End: 2020-09-01
Payer: MEDICARE

## 2020-09-01 ENCOUNTER — OFFICE VISIT (OUTPATIENT)
Dept: CARDIOLOGY | Facility: CLINIC | Age: 75
End: 2020-09-01
Payer: MEDICARE

## 2020-09-01 ENCOUNTER — OFFICE VISIT (OUTPATIENT)
Dept: ELECTROPHYSIOLOGY | Facility: CLINIC | Age: 75
End: 2020-09-01
Payer: MEDICARE

## 2020-09-01 VITALS
SYSTOLIC BLOOD PRESSURE: 136 MMHG | BODY MASS INDEX: 42.3 KG/M2 | DIASTOLIC BLOOD PRESSURE: 70 MMHG | HEIGHT: 64 IN | WEIGHT: 247.81 LBS

## 2020-09-01 VITALS
DIASTOLIC BLOOD PRESSURE: 72 MMHG | SYSTOLIC BLOOD PRESSURE: 159 MMHG | HEART RATE: 77 BPM | WEIGHT: 247 LBS | BODY MASS INDEX: 42.17 KG/M2 | HEIGHT: 64 IN

## 2020-09-01 DIAGNOSIS — I48.20 ATRIAL FIBRILLATION, CHRONIC: Primary | ICD-10-CM

## 2020-09-01 DIAGNOSIS — E87.1 HYPONATREMIA: ICD-10-CM

## 2020-09-01 DIAGNOSIS — I48.19 PERSISTENT ATRIAL FIBRILLATION: ICD-10-CM

## 2020-09-01 DIAGNOSIS — I10 ESSENTIAL HYPERTENSION: ICD-10-CM

## 2020-09-01 DIAGNOSIS — E11.42 CONTROLLED TYPE 2 DIABETES MELLITUS WITH DIABETIC POLYNEUROPATHY, WITHOUT LONG-TERM CURRENT USE OF INSULIN: ICD-10-CM

## 2020-09-01 DIAGNOSIS — K55.1 CHRONIC MESENTERIC ISCHEMIA: ICD-10-CM

## 2020-09-01 DIAGNOSIS — E66.01 MORBID OBESITY: ICD-10-CM

## 2020-09-01 DIAGNOSIS — E78.00 PURE HYPERCHOLESTEROLEMIA: ICD-10-CM

## 2020-09-01 DIAGNOSIS — R55 VASOVAGAL SYNCOPE: ICD-10-CM

## 2020-09-01 DIAGNOSIS — I48.92 ATRIAL FLUTTER, UNSPECIFIED TYPE: ICD-10-CM

## 2020-09-01 DIAGNOSIS — I49.8 OTHER SPECIFIED CARDIAC ARRHYTHMIAS: ICD-10-CM

## 2020-09-01 DIAGNOSIS — Z95.2 S/P TAVR (TRANSCATHETER AORTIC VALVE REPLACEMENT): Primary | ICD-10-CM

## 2020-09-01 DIAGNOSIS — J41.0 SIMPLE CHRONIC BRONCHITIS: ICD-10-CM

## 2020-09-01 DIAGNOSIS — Z86.73 OLD LACUNAR STROKE WITHOUT LATE EFFECT: ICD-10-CM

## 2020-09-01 PROCEDURE — 99999 PR PBB SHADOW E&M-EST. PATIENT-LVL II: ICD-10-PCS | Mod: PBBFAC,,, | Performed by: INTERNAL MEDICINE

## 2020-09-01 PROCEDURE — 99214 PR OFFICE/OUTPT VISIT, EST, LEVL IV, 30-39 MIN: ICD-10-PCS | Mod: S$GLB,,, | Performed by: INTERNAL MEDICINE

## 2020-09-01 PROCEDURE — 3074F SYST BP LT 130 MM HG: CPT | Mod: CPTII,S$GLB,, | Performed by: NURSE PRACTITIONER

## 2020-09-01 PROCEDURE — 3078F PR MOST RECENT DIASTOLIC BLOOD PRESSURE < 80 MM HG: ICD-10-PCS | Mod: CPTII,S$GLB,, | Performed by: INTERNAL MEDICINE

## 2020-09-01 PROCEDURE — 3078F DIAST BP <80 MM HG: CPT | Mod: CPTII,S$GLB,, | Performed by: INTERNAL MEDICINE

## 2020-09-01 PROCEDURE — 1101F PR PT FALLS ASSESS DOC 0-1 FALLS W/OUT INJ PAST YR: ICD-10-PCS | Mod: CPTII,S$GLB,, | Performed by: NURSE PRACTITIONER

## 2020-09-01 PROCEDURE — 99999 PR PBB SHADOW E&M-EST. PATIENT-LVL V: ICD-10-PCS | Mod: PBBFAC,,, | Performed by: NURSE PRACTITIONER

## 2020-09-01 PROCEDURE — 99214 OFFICE O/P EST MOD 30 MIN: CPT | Mod: S$GLB,,, | Performed by: INTERNAL MEDICINE

## 2020-09-01 PROCEDURE — 3078F PR MOST RECENT DIASTOLIC BLOOD PRESSURE < 80 MM HG: ICD-10-PCS | Mod: CPTII,S$GLB,, | Performed by: NURSE PRACTITIONER

## 2020-09-01 PROCEDURE — 1159F PR MEDICATION LIST DOCUMENTED IN MEDICAL RECORD: ICD-10-PCS | Mod: S$GLB,,, | Performed by: INTERNAL MEDICINE

## 2020-09-01 PROCEDURE — 93010 RHYTHM STRIP: ICD-10-PCS | Mod: S$GLB,,, | Performed by: INTERNAL MEDICINE

## 2020-09-01 PROCEDURE — 1126F PR PAIN SEVERITY QUANTIFIED, NO PAIN PRESENT: ICD-10-PCS | Mod: S$GLB,,, | Performed by: NURSE PRACTITIONER

## 2020-09-01 PROCEDURE — 1101F PT FALLS ASSESS-DOCD LE1/YR: CPT | Mod: CPTII,S$GLB,, | Performed by: INTERNAL MEDICINE

## 2020-09-01 PROCEDURE — 1126F AMNT PAIN NOTED NONE PRSNT: CPT | Mod: S$GLB,,, | Performed by: NURSE PRACTITIONER

## 2020-09-01 PROCEDURE — 1101F PR PT FALLS ASSESS DOC 0-1 FALLS W/OUT INJ PAST YR: ICD-10-PCS | Mod: CPTII,S$GLB,, | Performed by: INTERNAL MEDICINE

## 2020-09-01 PROCEDURE — 3075F SYST BP GE 130 - 139MM HG: CPT | Mod: CPTII,S$GLB,, | Performed by: INTERNAL MEDICINE

## 2020-09-01 PROCEDURE — 93005 ELECTROCARDIOGRAM TRACING: CPT | Mod: S$GLB

## 2020-09-01 PROCEDURE — 1159F MED LIST DOCD IN RCRD: CPT | Mod: S$GLB,,, | Performed by: NURSE PRACTITIONER

## 2020-09-01 PROCEDURE — 99214 PR OFFICE/OUTPT VISIT, EST, LEVL IV, 30-39 MIN: ICD-10-PCS | Mod: S$GLB,,, | Performed by: NURSE PRACTITIONER

## 2020-09-01 PROCEDURE — 3074F PR MOST RECENT SYSTOLIC BLOOD PRESSURE < 130 MM HG: ICD-10-PCS | Mod: CPTII,S$GLB,, | Performed by: NURSE PRACTITIONER

## 2020-09-01 PROCEDURE — 99214 OFFICE O/P EST MOD 30 MIN: CPT | Mod: S$GLB,,, | Performed by: NURSE PRACTITIONER

## 2020-09-01 PROCEDURE — 1159F MED LIST DOCD IN RCRD: CPT | Mod: S$GLB,,, | Performed by: INTERNAL MEDICINE

## 2020-09-01 PROCEDURE — 1159F PR MEDICATION LIST DOCUMENTED IN MEDICAL RECORD: ICD-10-PCS | Mod: S$GLB,,, | Performed by: NURSE PRACTITIONER

## 2020-09-01 PROCEDURE — 3075F PR MOST RECENT SYSTOLIC BLOOD PRESS GE 130-139MM HG: ICD-10-PCS | Mod: CPTII,S$GLB,, | Performed by: INTERNAL MEDICINE

## 2020-09-01 PROCEDURE — 99999 PR PBB SHADOW E&M-EST. PATIENT-LVL II: CPT | Mod: PBBFAC,,, | Performed by: INTERNAL MEDICINE

## 2020-09-01 PROCEDURE — 99999 PR PBB SHADOW E&M-EST. PATIENT-LVL V: CPT | Mod: PBBFAC,,, | Performed by: NURSE PRACTITIONER

## 2020-09-01 PROCEDURE — 1101F PT FALLS ASSESS-DOCD LE1/YR: CPT | Mod: CPTII,S$GLB,, | Performed by: NURSE PRACTITIONER

## 2020-09-01 PROCEDURE — 93010 ELECTROCARDIOGRAM REPORT: CPT | Mod: S$GLB,,, | Performed by: INTERNAL MEDICINE

## 2020-09-01 PROCEDURE — 3078F DIAST BP <80 MM HG: CPT | Mod: CPTII,S$GLB,, | Performed by: NURSE PRACTITIONER

## 2020-09-01 RX ORDER — CHOLECALCIFEROL (VITAMIN D3) 25 MCG
2000 TABLET ORAL DAILY
Status: ON HOLD | COMMUNITY
End: 2024-02-11

## 2020-09-01 RX ORDER — POLYETHYLENE GLYCOL 3350 17 G/17G
POWDER, FOR SOLUTION ORAL
COMMUNITY
End: 2022-08-11

## 2020-09-01 RX ORDER — LISINOPRIL 20 MG/1
20 TABLET ORAL DAILY
COMMUNITY
End: 2020-09-17 | Stop reason: SDUPTHER

## 2020-09-01 RX ORDER — DILTIAZEM HYDROCHLORIDE 240 MG/1
240 CAPSULE, COATED, EXTENDED RELEASE ORAL DAILY
Qty: 30 CAPSULE | Refills: 11 | Status: ON HOLD | OUTPATIENT
Start: 2020-09-01 | End: 2020-10-21 | Stop reason: HOSPADM

## 2020-09-01 RX ORDER — MONTELUKAST SODIUM 10 MG/1
10 TABLET ORAL NIGHTLY
Status: ON HOLD | COMMUNITY
End: 2020-10-21 | Stop reason: HOSPADM

## 2020-09-01 RX ORDER — DOCUSATE SODIUM 100 MG/1
100 CAPSULE, LIQUID FILLED ORAL 2 TIMES DAILY
Status: ON HOLD | COMMUNITY
End: 2020-10-21 | Stop reason: HOSPADM

## 2020-09-01 NOTE — PROGRESS NOTES
"  Subjective:      Patient ID: Adriana Strong is a 75 y.o. female.    Chief Complaint: Follow-up (Blood pressure follow up.  Saw Dr Mcgraw. ) and Cough    HPI:  Pt c/o "deep down cough" especially at night.    Pt c/o shortness of breath at night.    Pt c/o pressure in her chest in the morning.    Pt c/o shortness of breath walking to the bathroom in the morning.    Pt c/o shortness of breath climbing 12 steps.    Review of Systems   Cardiovascular: Positive for chest pain and dyspnea on exertion. Negative for claudication, irregular heartbeat, leg swelling, near-syncope, orthopnea, palpitations and syncope.      Neck hurts at times    Saw Dr Mcgraw yesterday who checked lab for thyroid.      Past Medical History:   Diagnosis Date    Acute on chronic diastolic (congestive) heart failure 11/20/2019    Anticoagulant long-term use     on Plavix since May 2015    Anxiety     Arthritis     Asthma     Atrial fibrillation     Cataracts, bilateral     Chest pain, atypical     Colon polyp     Coronary artery disease     Diabetes mellitus     Dry eyes     Esophageal erosions     GERD (gastroesophageal reflux disease)     Heart murmur     Hemorrhoid     High cholesterol     Hypertension     Irritable bowel syndrome     Shingles 2015    Stenosis of aortic and mitral valves     Stroke     TIA (transient ischemic attack)     Use of cane as ambulatory aid         Past Surgical History:   Procedure Laterality Date    ABDOMINAL SURGERY      stents to SMA    angiocele      2007, 2014    AORTIC VALVE REPLACEMENT N/A 11/19/2019    Procedure: Replacement-valve-aortic;  Surgeon: Jack Capone MD;  Location: [facility] CATH LAB;  Service: Cardiology;  Laterality: N/A;    BREAST SURGERY      left--- a lump--- no cancer    CARDIAC VALVE SURGERY      COLONOSCOPY  2014    COLONOSCOPY N/A 3/14/2018    Procedure: COLONOSCOPY;  Surgeon: Efren Kemp MD;  Location: [facility] ENDO ([address] [place] [address]);  Service: Endoscopy;  Laterality: " N/A;  ok to hold Plavix 5 days prior to procedure per Dr RESHMA Hernandez     ok per Dr Kemp to hold Eliquis 2 days prior to procedure (see telephone encounter dated-2/7/18)    HERNIA REPAIR      HYSTERECTOMY  partial    1982 partial hysterectomy    LEFT HEART CATHETERIZATION Right 11/5/2019    Procedure: Left heart cath;  Surgeon: Jack Capone MD;  Location: [facility] CATH LAB;  Service: Cardiology;  Laterality: Right;    left nasal polyp      left neck lymph node      nose polyp      right hip fatty mass tissue      stent to small intestine      SUPERIOR VENA CAVA ANGIOPLASTY / STENTING      TONSILLECTOMY      TOTAL ABDOMINAL HYSTERECTOMY      2014    TOTAL KNEE ARTHROPLASTY Bilateral     TREATMENT OF CARDIAC ARRHYTHMIA N/A 2/10/2020    Procedure: CARDIOVERSION;  Surgeon: Jayy Parrish MD;  Location: [facility] EP LAB;  Service: Cardiology;  Laterality: N/A;  AF, PEDRO, DCCV, MAC, DM, 3 Prep    TREATMENT OF CARDIAC ARRHYTHMIA N/A 5/15/2020    Procedure: CARDIOVERSION;  Surgeon: Hany Bee MD;  Location: [facility] EP LAB;  Service: Cardiology;  Laterality: N/A;  AF, PEDRO, DCCV, MAC, DM, 3 Prep    UPPER GASTROINTESTINAL ENDOSCOPY  2014       Family History   Problem Relation Age of Onset    Colon cancer Neg Hx     Inflammatory bowel disease Neg Hx        Social History     Socioeconomic History    Marital status:      Spouse name: Not on file    Number of children: Not on file    Years of education: Not on file    Highest education level: Not on file   Occupational History    Not on file   Social Needs    Financial resource strain: Not on file    Food insecurity     Worry: Not on file     Inability: Not on file    Transportation needs     Medical: Not on file     Non-medical: Not on file   Tobacco Use    Smoking status: Never Smoker    Smokeless tobacco: Never Used   Substance and Sexual Activity    Alcohol use: No    Drug use: No    Sexual activity: Not on file   Lifestyle    Physical activity      Days per week: Not on file     Minutes per session: Not on file    Stress: Not on file   Relationships    Social connections     Talks on phone: Not on file     Gets together: Not on file     Attends Sikhism service: Not on file     Active member of club or organization: Not on file     Attends meetings of clubs or organizations: Not on file     Relationship status: Not on file   Other Topics Concern    Not on file   Social History Narrative    Not on file       Current Outpatient Medications on File Prior to Visit   Medication Sig Dispense Refill    acetaminophen (TYLENOL ARTHRITIS) 650 MG TbSR Take 1,300 mg by mouth 2 (two) times daily.      albuterol (ACCUNEB) 1.25 mg/3 mL Nebu Take scheduled q4 for the next two days while at home then resume prn dosing 3 mL 1    albuterol (VENTOLIN HFA) 90 mcg/actuation inhaler Inhale 2 puffs into the lungs every 6 (six) hours as needed for Wheezing. Rescue      amiodarone (PACERONE) 200 MG Tab Take 400 mg ( 2 tablets ) twice daily for 2 weeks, then reduce to 400 mg ( 2 tablets) once daily  for 2 weeks , then reduce to 200 mg  ( 1 tablet) once daily thereafter. 90 tablet 5    apixaban (ELIQUIS) 5 mg Tab Take 1 tablet (5 mg total) by mouth 2 (two) times daily. 180 tablet 3    ascorbic acid (VITAMIN C) 500 MG tablet Take 1,000 mg by mouth once daily.       atorvastatin (LIPITOR) 10 MG tablet Take 1 tablet (10 mg total) by mouth once daily. 90 tablet 3    azelastine (ASTELIN) 137 mcg nasal spray 1 spray by Nasal route 2 (two) times daily.      bisacodyl (DULCOLAX) 5 mg EC tablet Take 5 mg by mouth daily as needed for Constipation.      budesonide-formoterol 160-4.5 mcg (SYMBICORT) 160-4.5 mcg/actuation HFAA Inhale 2 puffs into the lungs every 12 (twelve) hours. Controller      calcium carbonate/vitamin D3 (CALTRATE 600 + D ORAL) Take by mouth.      clopidogrel (PLAVIX) 75 mg tablet Take 1 tablet (75 mg total) by mouth once daily. 90 tablet 3    cyanocobalamin  (VITAMIN B-12) 1000 MCG tablet Take 100 mcg by mouth once daily.      diltiaZEM (CARDIZEM CD) 180 MG 24 hr capsule Take 1 capsule (180 mg total) by mouth once daily. 90 capsule 3    docusate sodium (COLACE) 100 MG capsule Take 100 mg by mouth 2 (two) times daily.      DULoxetine (CYMBALTA) 20 MG capsule TAKE 1 CAPSULE BY MOUTH EVERY DAY 90 capsule 3    fexofenadine (ALLEGRA) 60 MG tablet Take 180 mg by mouth every morning.       fish oil-omega-3 fatty acids 300-1,000 mg capsule Take 1 g by mouth once daily.       fluticasone (FLONASE) 50 mcg/actuation nasal spray 1 spray by Each Nare route every morning.       folic acid/multivit-min/lutein (CENTRUM SILVER ORAL) Take by mouth.      furosemide (LASIX) 40 MG tablet TAKE 1 TABLET BY MOUTH EVERY DAY 90 tablet 3    gabapentin (NEURONTIN) 300 MG capsule Take 2 capsules (600 mg total) by mouth 3 (three) times daily. 540 capsule 3    hydroCHLOROthiazide (MICROZIDE) 12.5 mg capsule Take 1 capsule (12.5 mg total) by mouth once daily. 90 capsule 3    isosorbide mononitrate (IMDUR) 30 MG 24 hr tablet Take 1 tablet (30 mg total) by mouth once daily. 30 tablet 11    lisinopriL (PRINIVIL,ZESTRIL) 20 MG tablet Take 20 mg by mouth once daily.      lorazepam (ATIVAN) 0.5 MG tablet Take 0.5 mg by mouth every morning.       magnesium 250 mg Tab Take 250 mg by mouth. 3 in am and 3 pm      metFORMIN (GLUCOPHAGE) 500 MG tablet Take 500 mg by mouth 2 (two) times daily.  1    montelukast (SINGULAIR) 10 mg tablet Take 10 mg by mouth every evening.      nitroGLYCERIN (NITROSTAT) 0.4 MG SL tablet Place 1 tablet (0.4 mg total) under the tongue every 5 (five) minutes as needed for Chest pain. 30 tablet 11    ondansetron (ZOFRAN-ODT) 4 MG TbDL DISSOLVE 1 TABLET ON TONGUE EVERY 8 HOURS AS NEEDED FOR NAUSEA  1    oxybutynin (DITROPAN-XL) 5 MG TR24 Take 5 mg by mouth every other day.       pantoprazole (PROTONIX) 40 MG tablet Take 1 tablet (40 mg total) by mouth once daily.  (Patient taking differently: Take 40 mg by mouth every morning. ) 90 tablet 3    polyethylene glycol (GLYCOLAX) 17 gram PwPk Take by mouth.      POLYSORBATE 80/GLYCERIN (REFRESH DRY EYE THERAPY OPHT) Apply to eye 2 (two) times daily.      potassium gluconate 550 mg (90 mg) Tab Take by mouth. 2 in am and 2 pm      traMADol (ULTRAM) 50 mg tablet Take 1 tablet (50 mg total) by mouth every 6 (six) hours as needed for Pain. 20 tablet 0    vitamin D (VITAMIN D3) 1000 units Tab Take 2,000 Units by mouth once daily.      calcium carbonate (OS-MICHAEL) 600 mg (1,500 mg) Tab Take 1,200 mg by mouth once daily.      ciclopirox 1 % shampoo Apply topically every evening.      cimetidine (TAGAMET) 200 MG tablet Take 300 mg by mouth.       clobetasoL (TEMOVATE) 0.05 % external solution Apply topically 2 (two) times daily.      fluconazole (DIFLUCAN) 150 MG Tab TAKE 1 TABLET (150 MG TOTAL) BY MOUTH ONCE. FOR 1 DOSE  0    losartan (COZAAR) 50 MG tablet Take 1 tablet (50 mg total) by mouth 2 (two) times daily. 60 tablet 11    mometasone 0.1% (ELOCON) 0.1 % cream Apply topically once daily.      nystatin (MYCOSTATIN) powder Apply topically 4 (four) times daily. (Patient not taking: Reported on 9/1/2020) 1 Bottle 0    triamcinolone acetonide 0.1% (KENALOG) 0.1 % cream Apply topically 2 (two) times daily.      zafirlukast (ACCOLATE) 20 MG tablet Take 20 mg by mouth once daily.  3     Current Facility-Administered Medications on File Prior to Visit   Medication Dose Route Frequency Provider Last Rate Last Dose    0.9%  NaCl infusion   Intravenous Continuous Lynette Hightower NP        sodium chloride 0.9% flush 5 mL  5 mL Intravenous PRN Lynette Hightower NP           Review of patient's allergies indicates:   Allergen Reactions    Celebrex [celecoxib] Shortness Of Breath    Ciprofloxacin Swelling     lip    Dicyclomine Hives    Adhesive Dermatitis    Avelox [moxifloxacin] Swelling    Cilostazol Other (See  "Comments)     Elevates blood pressure    Eryc [erythromycin] Other (See Comments)     unknown    Iodine and iodide containing products Hives    Keflex [cephalexin] Hives    Meclomen      rashes    Meloxicam      Ear ringing    Metoclopramide Other (See Comments)     High blood pressure    Sulfa (sulfonamide antibiotics) Itching     Objective:     Vitals:    09/01/20 0825 09/01/20 0910   BP:  136/70   BP Location:  Left arm   Patient Position:  Sitting   Weight: 112.4 kg (247 lb 12.8 oz)    Height: 5' 4" (1.626 m)         Physical Exam   Constitutional: She is oriented to person, place, and time. She appears well-developed and well-nourished.   Eyes: No scleral icterus.   Neck: No JVD present. Carotid bruit is not present.   Cardiovascular: Normal rate and regular rhythm. Exam reveals no gallop.   Murmur (III/VI systolic) heard.  Pulmonary/Chest: Breath sounds normal.   Musculoskeletal:         General: Edema (trivial edema) present.   Neurological: She is alert and oriented to person, place, and time.   Skin: Skin is warm and dry.   Psychiatric: She has a normal mood and affect. Her behavior is normal. Judgment and thought content normal.   Vitals reviewed.     Wt up 22 lbs since May 2020    Labs last week:  Na 134, BUN 21 creat 0.78  HDL 75  LDL 75  TSH 4.18 CBC WNL    Assessment:     1. S/P TAVR (transcatheter aortic valve replacement)    2. Essential hypertension    3. Pure hypercholesterolemia    4. Controlled type 2 diabetes mellitus with diabetic polyneuropathy, without long-term current use of insulin    5. Morbid obesity    6. Chronic mesenteric ischemia    7. Persistent atrial fibrillation    8. Simple chronic bronchitis    9. Old lacunar stroke without late effect    10. Hyponatremia      Plan:   Adriana was seen today for follow-up and cough.    Diagnoses and all orders for this visit:    S/P TAVR (transcatheter aortic valve replacement)    Essential hypertension    Pure " hypercholesterolemia    Controlled type 2 diabetes mellitus with diabetic polyneuropathy, without long-term current use of insulin    Morbid obesity    Chronic mesenteric ischemia    Persistent atrial fibrillation    Simple chronic bronchitis    Old lacunar stroke without late effect    Hyponatremia  -     Basic metabolic panel; Future       Clinically sinus rhythm S/P cardiversion on amiodarone    Same meds for noow    Low carb diet discussed in detail    Fluid restriction discussed in detail.    RTC 3 months      BMP in one month    If Na goes lower will stop the HCTZ and increase the furosemide to bid    F/u with AMY Chacon today    Follow up in about 3 months (around 12/1/2020).

## 2020-09-01 NOTE — PATIENT INSTRUCTIONS
Stop amiodarone.  Change diltiazem to 240mg daily.   Holter monitor in 3 months.  RTC in 6 months.

## 2020-09-17 ENCOUNTER — TELEPHONE (OUTPATIENT)
Dept: CARDIOLOGY | Facility: HOSPITAL | Age: 75
End: 2020-09-17

## 2020-09-17 RX ORDER — LISINOPRIL 20 MG/1
20 TABLET ORAL DAILY
Qty: 180 TABLET | Refills: 3 | Status: ON HOLD | OUTPATIENT
Start: 2020-09-17 | End: 2020-10-30 | Stop reason: SDUPTHER

## 2020-09-17 NOTE — TELEPHONE ENCOUNTER
Called patient to reschedule her appt for a holter monitor today due to inventory. She now has an appt 9/23/20 @ 1400 at [facility] much closer to her home.

## 2020-09-22 ENCOUNTER — PATIENT MESSAGE (OUTPATIENT)
Dept: CARDIOLOGY | Facility: CLINIC | Age: 75
End: 2020-09-22

## 2020-09-22 ENCOUNTER — PATIENT MESSAGE (OUTPATIENT)
Dept: ELECTROPHYSIOLOGY | Facility: CLINIC | Age: 75
End: 2020-09-22

## 2020-09-22 ENCOUNTER — PATIENT MESSAGE (OUTPATIENT)
Dept: VASCULAR SURGERY | Facility: CLINIC | Age: 75
End: 2020-09-22

## 2020-09-23 ENCOUNTER — HOSPITAL ENCOUNTER (OUTPATIENT)
Dept: CARDIOLOGY | Facility: HOSPITAL | Age: 75
Discharge: HOME | End: 2020-09-23
Attending: NURSE PRACTITIONER
Payer: MEDICARE

## 2020-09-23 DIAGNOSIS — I48.20 ATRIAL FIBRILLATION, CHRONIC: ICD-10-CM

## 2020-09-23 PROCEDURE — 93227 HOLTER MONITOR - 24 HOUR (CUPID ONLY): ICD-10-PCS | Mod: ,,, | Performed by: INTERNAL MEDICINE

## 2020-09-23 PROCEDURE — 93225 XTRNL ECG REC<48 HRS REC: CPT | Mod: PO

## 2020-09-23 PROCEDURE — 93227 XTRNL ECG REC<48 HR R&I: CPT | Mod: ,,, | Performed by: INTERNAL MEDICINE

## 2020-09-28 ENCOUNTER — TELEPHONE (OUTPATIENT)
Dept: CARDIOLOGY | Facility: CLINIC | Age: 75
End: 2020-09-28

## 2020-09-28 LAB
OHS CV EVENT MONITOR DAY: 0
OHS CV HOLTER LENGTH DECIMAL HOURS: 23.98
OHS CV HOLTER LENGTH HOURS: 23
OHS CV HOLTER LENGTH MINUTES: 59

## 2020-09-28 NOTE — TELEPHONE ENCOUNTER
Spoke with daughter and told her that I faxed over the note from Dr. Carrington over to Dr Montaño.    ----- Message from Nasima Mccracken sent at 9/28/2020 10:22 AM CDT -----  Regarding: Medical clearance  Type:  Needs Medical Advice    Who Called:  Tammy (daughter)  Would the patient rather a call back or a response via [facility]?  Call back  Best Call Back Number:  [phone]  Additional Information:  needs to get a written note advising approval for her to discontinue her apixaban (ELIQUIS) 5 mg Tab prior to her dental extraction; their fax is [phone], Dr. Drew Steen is the oral surgeon

## 2020-09-29 ENCOUNTER — PATIENT MESSAGE (OUTPATIENT)
Dept: ELECTROPHYSIOLOGY | Facility: CLINIC | Age: 75
End: 2020-09-29

## 2020-09-30 ENCOUNTER — PATIENT MESSAGE (OUTPATIENT)
Dept: ELECTROPHYSIOLOGY | Facility: CLINIC | Age: 75
End: 2020-09-30

## 2020-10-01 RX ORDER — ISOSORBIDE MONONITRATE 30 MG/1
30 TABLET, EXTENDED RELEASE ORAL DAILY
Qty: 90 TABLET | Refills: 3 | Status: SHIPPED | OUTPATIENT
Start: 2020-10-01 | End: 2021-07-13

## 2020-10-01 NOTE — TELEPHONE ENCOUNTER
----- Message from Janice Frazier sent at 10/1/2020 10:27 AM CDT -----  Type:  RX Refill Request    Who Called: patient    RX Name and Strength: isosorbide mononitrate (IMDUR) 30 MG 24 hr tablet     How is the patient currently taking it? (ex. 1XDay): Sig - Route: Take 1 tablet (30 mg total) by mouth once daily.    Is this a 30 day or 90 day RX: 90    Preferred Pharmacy with phone number: [phone]    Local or Mail Order: local    Ordering Provider: Zeb    Would the patient rather a call back or a response via [facility]? call    Best Call Back Number: [phone]    Additional Information: none

## 2020-10-05 ENCOUNTER — LAB VISIT (OUTPATIENT)
Dept: LAB | Facility: HOSPITAL | Age: 75
End: 2020-10-05
Attending: INTERNAL MEDICINE
Payer: MEDICARE

## 2020-10-05 DIAGNOSIS — E87.1 HYPONATREMIA: ICD-10-CM

## 2020-10-05 LAB
ANION GAP SERPL CALC-SCNC: 6 MMOL/L (ref 8–16)
BUN SERPL-MCNC: 15 MG/DL (ref 7–17)
CALCIUM SERPL-MCNC: 9.1 MG/DL (ref 8.7–10.5)
CHLORIDE SERPL-SCNC: 95 MMOL/L (ref 95–110)
CO2 SERPL-SCNC: 35 MMOL/L (ref 23–29)
CREAT SERPL-MCNC: 0.67 MG/DL (ref 0.5–1.4)
EST. GFR  (AFRICAN AMERICAN): >60 ML/MIN/1.73 M^2
EST. GFR  (NON AFRICAN AMERICAN): >60 ML/MIN/1.73 M^2
GLUCOSE SERPL-MCNC: 104 MG/DL (ref 70–110)
POTASSIUM SERPL-SCNC: 4.5 MMOL/L (ref 3.5–5.1)
SODIUM SERPL-SCNC: 136 MMOL/L (ref 136–145)

## 2020-10-05 PROCEDURE — 80048 BASIC METABOLIC PNL TOTAL CA: CPT | Mod: PO

## 2020-10-05 PROCEDURE — 36415 COLL VENOUS BLD VENIPUNCTURE: CPT | Mod: PO

## 2020-10-19 ENCOUNTER — HOSPITAL ENCOUNTER (INPATIENT)
Facility: HOSPITAL | Age: 75
LOS: 2 days | Discharge: HOME | DRG: 202 | End: 2020-10-21
Attending: EMERGENCY MEDICINE | Admitting: INTERNAL MEDICINE
Payer: MEDICARE

## 2020-10-19 DIAGNOSIS — I48.20 CHRONIC ATRIAL FIBRILLATION WITH RAPID VENTRICULAR RESPONSE: ICD-10-CM

## 2020-10-19 DIAGNOSIS — I48.91 ATRIAL FIBRILLATION: ICD-10-CM

## 2020-10-19 DIAGNOSIS — N30.00 ACUTE CYSTITIS WITHOUT HEMATURIA: ICD-10-CM

## 2020-10-19 DIAGNOSIS — J45.51 SEVERE PERSISTENT ASTHMA WITH EXACERBATION: ICD-10-CM

## 2020-10-19 DIAGNOSIS — I10 ESSENTIAL HYPERTENSION: ICD-10-CM

## 2020-10-19 DIAGNOSIS — E78.2 MIXED HYPERLIPIDEMIA: ICD-10-CM

## 2020-10-19 DIAGNOSIS — D72.823 LEUKEMOID REACTION: ICD-10-CM

## 2020-10-19 DIAGNOSIS — J44.1 COPD EXACERBATION: ICD-10-CM

## 2020-10-19 DIAGNOSIS — Z95.2 S/P TAVR (TRANSCATHETER AORTIC VALVE REPLACEMENT): ICD-10-CM

## 2020-10-19 DIAGNOSIS — E11.42 CONTROLLED TYPE 2 DIABETES MELLITUS WITH DIABETIC POLYNEUROPATHY, WITHOUT LONG-TERM CURRENT USE OF INSULIN: ICD-10-CM

## 2020-10-19 DIAGNOSIS — R06.02 SHORTNESS OF BREATH: ICD-10-CM

## 2020-10-19 DIAGNOSIS — I48.91 ATRIAL FIBRILLATION WITH RVR: Primary | ICD-10-CM

## 2020-10-19 DIAGNOSIS — R07.9 CHEST PAIN: ICD-10-CM

## 2020-10-19 DIAGNOSIS — R30.0 DYSURIA: ICD-10-CM

## 2020-10-19 DIAGNOSIS — J41.0 SIMPLE CHRONIC BRONCHITIS: ICD-10-CM

## 2020-10-19 DIAGNOSIS — J45.51 SEVERE PERSISTENT ASTHMA WITH ACUTE EXACERBATION: ICD-10-CM

## 2020-10-19 LAB
ALBUMIN SERPL BCP-MCNC: 4.3 G/DL (ref 3.5–5.2)
ALP SERPL-CCNC: 47 U/L (ref 55–135)
ALT SERPL W/O P-5'-P-CCNC: 55 U/L (ref 10–44)
ANION GAP SERPL CALC-SCNC: 14 MMOL/L (ref 8–16)
APTT BLDCRRT: 36.2 SEC (ref 21–32)
AST SERPL-CCNC: 45 U/L (ref 10–40)
BACTERIA #/AREA URNS HPF: ABNORMAL /HPF
BASOPHILS # BLD AUTO: 0.05 K/UL (ref 0–0.2)
BASOPHILS NFR BLD: 0.3 % (ref 0–1.9)
BILIRUB SERPL-MCNC: 0.5 MG/DL (ref 0.1–1)
BILIRUB UR QL STRIP: NEGATIVE
BILIRUB UR QL STRIP: NEGATIVE
BNP SERPL-MCNC: 90 PG/ML (ref 0–99)
BUN SERPL-MCNC: 20 MG/DL (ref 8–23)
CALCIUM SERPL-MCNC: 10.1 MG/DL (ref 8.7–10.5)
CHLORIDE SERPL-SCNC: 90 MMOL/L (ref 95–110)
CLARITY UR: CLEAR
CLARITY UR: CLEAR
CO2 SERPL-SCNC: 30 MMOL/L (ref 23–29)
COLOR UR: YELLOW
COLOR UR: YELLOW
CREAT SERPL-MCNC: 0.9 MG/DL (ref 0.5–1.4)
DIFFERENTIAL METHOD: ABNORMAL
EOSINOPHIL # BLD AUTO: 0 K/UL (ref 0–0.5)
EOSINOPHIL NFR BLD: 0.1 % (ref 0–8)
ERYTHROCYTE [DISTWIDTH] IN BLOOD BY AUTOMATED COUNT: 14.4 % (ref 11.5–14.5)
EST. GFR  (AFRICAN AMERICAN): >60 ML/MIN/1.73 M^2
EST. GFR  (NON AFRICAN AMERICAN): >60 ML/MIN/1.73 M^2
GLUCOSE SERPL-MCNC: 96 MG/DL (ref 70–110)
GLUCOSE UR QL STRIP: NEGATIVE
GLUCOSE UR QL STRIP: NEGATIVE
GRAN CASTS #/AREA URNS LPF: 1 /LPF
HCT VFR BLD AUTO: 44 % (ref 37–48.5)
HGB BLD-MCNC: 15 G/DL (ref 12–16)
HGB UR QL STRIP: ABNORMAL
HGB UR QL STRIP: ABNORMAL
HYALINE CASTS #/AREA URNS LPF: 0 /LPF
IMM GRANULOCYTES # BLD AUTO: 0.1 K/UL (ref 0–0.04)
IMM GRANULOCYTES NFR BLD AUTO: 0.5 % (ref 0–0.5)
INFLUENZA A, MOLECULAR: NEGATIVE
INFLUENZA B, MOLECULAR: NEGATIVE
INR PPP: 1 (ref 0.8–1.2)
KETONES UR QL STRIP: NEGATIVE
KETONES UR QL STRIP: NEGATIVE
LEUKOCYTE ESTERASE UR QL STRIP: ABNORMAL
LEUKOCYTE ESTERASE UR QL STRIP: ABNORMAL
LYMPHOCYTES # BLD AUTO: 5 K/UL (ref 1–4.8)
LYMPHOCYTES NFR BLD: 25.7 % (ref 18–48)
MAGNESIUM SERPL-MCNC: 1.8 MG/DL (ref 1.6–2.6)
MAGNESIUM SERPL-MCNC: 2 MG/DL (ref 1.6–2.6)
MCH RBC QN AUTO: 31.3 PG (ref 27–31)
MCHC RBC AUTO-ENTMCNC: 34.1 G/DL (ref 32–36)
MCV RBC AUTO: 92 FL (ref 82–98)
MICROSCOPIC COMMENT: ABNORMAL
MONOCYTES # BLD AUTO: 1.8 K/UL (ref 0.3–1)
MONOCYTES NFR BLD: 9.3 % (ref 4–15)
NEUTROPHILS # BLD AUTO: 12.5 K/UL (ref 1.8–7.7)
NEUTROPHILS NFR BLD: 64.1 % (ref 38–73)
NITRITE UR QL STRIP: NEGATIVE
NITRITE UR QL STRIP: NEGATIVE
NRBC BLD-RTO: 0 /100 WBC
PH UR STRIP: 7 [PH] (ref 5–8)
PH UR STRIP: 7 [PH] (ref 5–8)
PLATELET # BLD AUTO: 360 K/UL (ref 150–350)
PMV BLD AUTO: 9.6 FL (ref 9.2–12.9)
POCT GLUCOSE: 213 MG/DL (ref 70–110)
POTASSIUM SERPL-SCNC: 4.2 MMOL/L (ref 3.5–5.1)
PROCALCITONIN SERPL IA-MCNC: 0.09 NG/ML
PROT SERPL-MCNC: 8.3 G/DL (ref 6–8.4)
PROT UR QL STRIP: ABNORMAL
PROT UR QL STRIP: ABNORMAL
PROTHROMBIN TIME: 10.9 SEC (ref 9–12.5)
RBC # BLD AUTO: 4.8 M/UL (ref 4–5.4)
RBC #/AREA URNS HPF: 3 /HPF (ref 0–4)
SARS-COV-2 RDRP RESP QL NAA+PROBE: NEGATIVE
SODIUM SERPL-SCNC: 134 MMOL/L (ref 136–145)
SP GR UR STRIP: 1.01 (ref 1–1.03)
SP GR UR STRIP: 1.01 (ref 1–1.03)
SPECIMEN SOURCE: NORMAL
SQUAMOUS #/AREA URNS HPF: 4 /HPF
TROPONIN I SERPL DL<=0.01 NG/ML-MCNC: 0.01 NG/ML (ref 0–0.03)
URN SPEC COLLECT METH UR: ABNORMAL
URN SPEC COLLECT METH UR: ABNORMAL
UROBILINOGEN UR STRIP-ACNC: NEGATIVE EU/DL
UROBILINOGEN UR STRIP-ACNC: NEGATIVE EU/DL
WBC # BLD AUTO: 19.51 K/UL (ref 3.9–12.7)
WBC #/AREA URNS HPF: 20 /HPF (ref 0–5)
WBC CLUMPS URNS QL MICRO: ABNORMAL

## 2020-10-19 PROCEDURE — 84484 ASSAY OF TROPONIN QUANT: CPT

## 2020-10-19 PROCEDURE — 25000003 PHARM REV CODE 250: Performed by: EMERGENCY MEDICINE

## 2020-10-19 PROCEDURE — 27100107 HC POCKET PEAK FLOW METER

## 2020-10-19 PROCEDURE — 85025 COMPLETE CBC W/AUTO DIFF WBC: CPT

## 2020-10-19 PROCEDURE — 93010 ELECTROCARDIOGRAM REPORT: CPT | Mod: ,,, | Performed by: INTERNAL MEDICINE

## 2020-10-19 PROCEDURE — 96375 TX/PRO/DX INJ NEW DRUG ADDON: CPT

## 2020-10-19 PROCEDURE — 93005 ELECTROCARDIOGRAM TRACING: CPT

## 2020-10-19 PROCEDURE — 96374 THER/PROPH/DIAG INJ IV PUSH: CPT

## 2020-10-19 PROCEDURE — 80053 COMPREHEN METABOLIC PANEL: CPT

## 2020-10-19 PROCEDURE — 85730 THROMBOPLASTIN TIME PARTIAL: CPT

## 2020-10-19 PROCEDURE — 87086 URINE CULTURE/COLONY COUNT: CPT

## 2020-10-19 PROCEDURE — 94640 AIRWAY INHALATION TREATMENT: CPT

## 2020-10-19 PROCEDURE — 99291 CRITICAL CARE FIRST HOUR: CPT | Mod: 25

## 2020-10-19 PROCEDURE — 87186 SC STD MICRODIL/AGAR DIL: CPT

## 2020-10-19 PROCEDURE — 99292 CRITICAL CARE ADDL 30 MIN: CPT | Mod: 25

## 2020-10-19 PROCEDURE — 83880 ASSAY OF NATRIURETIC PEPTIDE: CPT

## 2020-10-19 PROCEDURE — 99285 EMERGENCY DEPT VISIT HI MDM: CPT | Mod: 25

## 2020-10-19 PROCEDURE — 25000242 PHARM REV CODE 250 ALT 637 W/ HCPCS: Performed by: STUDENT IN AN ORGANIZED HEALTH CARE EDUCATION/TRAINING PROGRAM

## 2020-10-19 PROCEDURE — 63600175 PHARM REV CODE 636 W HCPCS: Performed by: EMERGENCY MEDICINE

## 2020-10-19 PROCEDURE — 87088 URINE BACTERIA CULTURE: CPT

## 2020-10-19 PROCEDURE — 25000003 PHARM REV CODE 250: Performed by: STUDENT IN AN ORGANIZED HEALTH CARE EDUCATION/TRAINING PROGRAM

## 2020-10-19 PROCEDURE — 25000242 PHARM REV CODE 250 ALT 637 W/ HCPCS: Performed by: EMERGENCY MEDICINE

## 2020-10-19 PROCEDURE — 83735 ASSAY OF MAGNESIUM: CPT

## 2020-10-19 PROCEDURE — 63600175 PHARM REV CODE 636 W HCPCS: Performed by: STUDENT IN AN ORGANIZED HEALTH CARE EDUCATION/TRAINING PROGRAM

## 2020-10-19 PROCEDURE — 94761 N-INVAS EAR/PLS OXIMETRY MLT: CPT

## 2020-10-19 PROCEDURE — 85610 PROTHROMBIN TIME: CPT

## 2020-10-19 PROCEDURE — 83735 ASSAY OF MAGNESIUM: CPT | Mod: 91

## 2020-10-19 PROCEDURE — 84145 PROCALCITONIN (PCT): CPT

## 2020-10-19 PROCEDURE — 81000 URINALYSIS NONAUTO W/SCOPE: CPT

## 2020-10-19 PROCEDURE — 94644 CONT INHLJ TX 1ST HOUR: CPT

## 2020-10-19 PROCEDURE — 87502 INFLUENZA DNA AMP PROBE: CPT

## 2020-10-19 PROCEDURE — 99900035 HC TECH TIME PER 15 MIN (STAT)

## 2020-10-19 PROCEDURE — 11000001 HC ACUTE MED/SURG PRIVATE ROOM

## 2020-10-19 PROCEDURE — 36415 COLL VENOUS BLD VENIPUNCTURE: CPT

## 2020-10-19 PROCEDURE — 96376 TX/PRO/DX INJ SAME DRUG ADON: CPT

## 2020-10-19 PROCEDURE — 93010 EKG 12-LEAD: ICD-10-PCS | Mod: ,,, | Performed by: INTERNAL MEDICINE

## 2020-10-19 PROCEDURE — U0002 COVID-19 LAB TEST NON-CDC: HCPCS

## 2020-10-19 PROCEDURE — 87077 CULTURE AEROBIC IDENTIFY: CPT

## 2020-10-19 RX ORDER — POLYETHYLENE GLYCOL 3350 17 G/17G
17 POWDER, FOR SOLUTION ORAL DAILY
Status: DISCONTINUED | OUTPATIENT
Start: 2020-10-19 | End: 2020-10-21 | Stop reason: HOSPADM

## 2020-10-19 RX ORDER — ALBUTEROL SULFATE 2.5 MG/.5ML
15 SOLUTION RESPIRATORY (INHALATION)
Status: COMPLETED | OUTPATIENT
Start: 2020-10-19 | End: 2020-10-19

## 2020-10-19 RX ORDER — IBUPROFEN 200 MG
24 TABLET ORAL
Status: DISCONTINUED | OUTPATIENT
Start: 2020-10-19 | End: 2020-10-21 | Stop reason: HOSPADM

## 2020-10-19 RX ORDER — IPRATROPIUM BROMIDE AND ALBUTEROL SULFATE 2.5; .5 MG/3ML; MG/3ML
3 SOLUTION RESPIRATORY (INHALATION) EVERY 4 HOURS
Status: DISCONTINUED | OUTPATIENT
Start: 2020-10-19 | End: 2020-10-21 | Stop reason: HOSPADM

## 2020-10-19 RX ORDER — GABAPENTIN 300 MG/1
600 CAPSULE ORAL 3 TIMES DAILY
Status: DISCONTINUED | OUTPATIENT
Start: 2020-10-19 | End: 2020-10-21 | Stop reason: HOSPADM

## 2020-10-19 RX ORDER — ATORVASTATIN CALCIUM 10 MG/1
10 TABLET, FILM COATED ORAL DAILY
Status: DISCONTINUED | OUTPATIENT
Start: 2020-10-19 | End: 2020-10-21 | Stop reason: HOSPADM

## 2020-10-19 RX ORDER — LISINOPRIL 20 MG/1
20 TABLET ORAL DAILY
Status: DISCONTINUED | OUTPATIENT
Start: 2020-10-19 | End: 2020-10-21 | Stop reason: HOSPADM

## 2020-10-19 RX ORDER — CETIRIZINE HYDROCHLORIDE 5 MG/1
5 TABLET ORAL DAILY
Status: DISCONTINUED | OUTPATIENT
Start: 2020-10-19 | End: 2020-10-19

## 2020-10-19 RX ORDER — OXYBUTYNIN CHLORIDE 5 MG/1
5 TABLET, EXTENDED RELEASE ORAL EVERY OTHER DAY
Status: DISCONTINUED | OUTPATIENT
Start: 2020-10-20 | End: 2020-10-21 | Stop reason: HOSPADM

## 2020-10-19 RX ORDER — INSULIN ASPART 100 [IU]/ML
1-10 INJECTION, SOLUTION INTRAVENOUS; SUBCUTANEOUS
Status: DISCONTINUED | OUTPATIENT
Start: 2020-10-19 | End: 2020-10-21 | Stop reason: HOSPADM

## 2020-10-19 RX ORDER — SODIUM CHLORIDE 0.9 % (FLUSH) 0.9 %
10 SYRINGE (ML) INJECTION
Status: DISCONTINUED | OUTPATIENT
Start: 2020-10-19 | End: 2020-10-21 | Stop reason: HOSPADM

## 2020-10-19 RX ORDER — DOCUSATE SODIUM 100 MG/1
100 CAPSULE, LIQUID FILLED ORAL 2 TIMES DAILY
Status: DISCONTINUED | OUTPATIENT
Start: 2020-10-19 | End: 2020-10-19

## 2020-10-19 RX ORDER — BISACODYL 5 MG
5 TABLET, DELAYED RELEASE (ENTERIC COATED) ORAL DAILY
Status: DISCONTINUED | OUTPATIENT
Start: 2020-10-19 | End: 2020-10-21 | Stop reason: HOSPADM

## 2020-10-19 RX ORDER — DULOXETIN HYDROCHLORIDE 20 MG/1
20 CAPSULE, DELAYED RELEASE ORAL DAILY
Status: DISCONTINUED | OUTPATIENT
Start: 2020-10-19 | End: 2020-10-21 | Stop reason: HOSPADM

## 2020-10-19 RX ORDER — PANTOPRAZOLE SODIUM 40 MG/1
40 TABLET, DELAYED RELEASE ORAL EVERY MORNING
Status: DISCONTINUED | OUTPATIENT
Start: 2020-10-20 | End: 2020-10-21 | Stop reason: HOSPADM

## 2020-10-19 RX ORDER — PREDNISONE 20 MG/1
40 TABLET ORAL DAILY
Status: DISCONTINUED | OUTPATIENT
Start: 2020-10-20 | End: 2020-10-19

## 2020-10-19 RX ORDER — CLOPIDOGREL BISULFATE 75 MG/1
75 TABLET ORAL DAILY
Status: DISCONTINUED | OUTPATIENT
Start: 2020-10-19 | End: 2020-10-21 | Stop reason: HOSPADM

## 2020-10-19 RX ORDER — GLUCAGON 1 MG
1 KIT INJECTION
Status: DISCONTINUED | OUTPATIENT
Start: 2020-10-19 | End: 2020-10-21 | Stop reason: HOSPADM

## 2020-10-19 RX ORDER — PNV NO.95/FERROUS FUM/FOLIC AC 28MG-0.8MG
100 TABLET ORAL DAILY
Status: DISCONTINUED | OUTPATIENT
Start: 2020-10-19 | End: 2020-10-21 | Stop reason: HOSPADM

## 2020-10-19 RX ORDER — METHYLPREDNISOLONE SOD SUCC 125 MG
125 VIAL (EA) INJECTION
Status: COMPLETED | OUTPATIENT
Start: 2020-10-19 | End: 2020-10-19

## 2020-10-19 RX ORDER — METHYLPREDNISOLONE SOD SUCC 125 MG
60 VIAL (EA) INJECTION EVERY 6 HOURS
Status: DISCONTINUED | OUTPATIENT
Start: 2020-10-19 | End: 2020-10-20

## 2020-10-19 RX ORDER — BISACODYL 5 MG
5 TABLET, DELAYED RELEASE (ENTERIC COATED) ORAL DAILY PRN
Status: DISCONTINUED | OUTPATIENT
Start: 2020-10-19 | End: 2020-10-19

## 2020-10-19 RX ORDER — HYDROCHLOROTHIAZIDE 12.5 MG/1
12.5 TABLET ORAL DAILY
Status: DISCONTINUED | OUTPATIENT
Start: 2020-10-19 | End: 2020-10-20

## 2020-10-19 RX ORDER — ISOSORBIDE MONONITRATE 30 MG/1
30 TABLET, EXTENDED RELEASE ORAL DAILY
Status: DISCONTINUED | OUTPATIENT
Start: 2020-10-19 | End: 2020-10-21 | Stop reason: HOSPADM

## 2020-10-19 RX ORDER — MONTELUKAST SODIUM 10 MG/1
10 TABLET ORAL NIGHTLY
Status: DISCONTINUED | OUTPATIENT
Start: 2020-10-19 | End: 2020-10-21 | Stop reason: HOSPADM

## 2020-10-19 RX ORDER — DILTIAZEM HYDROCHLORIDE 5 MG/ML
20 INJECTION INTRAVENOUS
Status: COMPLETED | OUTPATIENT
Start: 2020-10-19 | End: 2020-10-19

## 2020-10-19 RX ORDER — DILTIAZEM HYDROCHLORIDE 120 MG/1
240 CAPSULE, COATED, EXTENDED RELEASE ORAL DAILY
Status: DISCONTINUED | OUTPATIENT
Start: 2020-10-19 | End: 2020-10-21 | Stop reason: DRUGHIGH

## 2020-10-19 RX ORDER — DILTIAZEM HYDROCHLORIDE 5 MG/ML
15 INJECTION INTRAVENOUS
Status: COMPLETED | OUTPATIENT
Start: 2020-10-19 | End: 2020-10-19

## 2020-10-19 RX ORDER — IBUPROFEN 200 MG
16 TABLET ORAL
Status: DISCONTINUED | OUTPATIENT
Start: 2020-10-19 | End: 2020-10-21 | Stop reason: HOSPADM

## 2020-10-19 RX ADMIN — POLYETHYLENE GLYCOL (3350) 17 G: 17 POWDER, FOR SOLUTION ORAL at 06:10

## 2020-10-19 RX ADMIN — MONTELUKAST 10 MG: 10 TABLET, FILM COATED ORAL at 08:10

## 2020-10-19 RX ADMIN — APIXABAN 5 MG: 5 TABLET, FILM COATED ORAL at 08:10

## 2020-10-19 RX ADMIN — ALBUTEROL SULFATE 15 MG: 2.5 SOLUTION RESPIRATORY (INHALATION) at 10:10

## 2020-10-19 RX ADMIN — DILTIAZEM HYDROCHLORIDE 15 MG: 5 INJECTION, SOLUTION INTRAVENOUS at 10:10

## 2020-10-19 RX ADMIN — HYDROCHLOROTHIAZIDE 12.5 MG: 12.5 TABLET ORAL at 06:10

## 2020-10-19 RX ADMIN — DILTIAZEM HYDROCHLORIDE 240 MG: 120 CAPSULE, COATED, EXTENDED RELEASE ORAL at 06:10

## 2020-10-19 RX ADMIN — INSULIN ASPART 2 UNITS: 100 INJECTION, SOLUTION INTRAVENOUS; SUBCUTANEOUS at 09:10

## 2020-10-19 RX ADMIN — VITAM B12 100 MCG: 100 TAB at 06:10

## 2020-10-19 RX ADMIN — DULOXETINE HYDROCHLORIDE 20 MG: 20 CAPSULE, DELAYED RELEASE ORAL at 06:10

## 2020-10-19 RX ADMIN — GABAPENTIN 600 MG: 300 CAPSULE ORAL at 06:10

## 2020-10-19 RX ADMIN — ISOSORBIDE MONONITRATE 30 MG: 30 TABLET, EXTENDED RELEASE ORAL at 06:10

## 2020-10-19 RX ADMIN — METHYLPREDNISOLONE SODIUM SUCCINATE 60 MG: 125 INJECTION, POWDER, FOR SOLUTION INTRAMUSCULAR; INTRAVENOUS at 06:10

## 2020-10-19 RX ADMIN — IPRATROPIUM BROMIDE AND ALBUTEROL SULFATE 3 ML: .5; 3 SOLUTION RESPIRATORY (INHALATION) at 03:10

## 2020-10-19 RX ADMIN — METHYLPREDNISOLONE SODIUM SUCCINATE 60 MG: 125 INJECTION, POWDER, FOR SOLUTION INTRAMUSCULAR; INTRAVENOUS at 11:10

## 2020-10-19 RX ADMIN — LISINOPRIL 20 MG: 20 TABLET ORAL at 06:10

## 2020-10-19 RX ADMIN — ATORVASTATIN CALCIUM 10 MG: 10 TABLET, FILM COATED ORAL at 06:10

## 2020-10-19 RX ADMIN — IPRATROPIUM BROMIDE AND ALBUTEROL SULFATE 3 ML: .5; 3 SOLUTION RESPIRATORY (INHALATION) at 08:10

## 2020-10-19 RX ADMIN — METHYLPREDNISOLONE SODIUM SUCCINATE 125 MG: 125 INJECTION, POWDER, FOR SOLUTION INTRAMUSCULAR; INTRAVENOUS at 10:10

## 2020-10-19 RX ADMIN — DILTIAZEM HYDROCHLORIDE 20 MG: 5 INJECTION, SOLUTION INTRAVENOUS at 12:10

## 2020-10-19 RX ADMIN — CLOPIDOGREL 75 MG: 75 TABLET, FILM COATED ORAL at 06:10

## 2020-10-19 RX ADMIN — BISACODYL 5 MG: 5 TABLET, COATED ORAL at 06:10

## 2020-10-19 NOTE — H&P
[facility] Medicine H&P Note     Admitting Team: \[facility]\"" Hospitalist Team A  Attending Physician: Dr. Marinelli  Resident: Josh  Intern: Jaun     Date of Admit: 10/19/2020    Chief Complaint     Shortness of Breath (c/o asthma exacerbation x1 week, with increasing SOB, chest tightness, and cough.)   for 1 week    Subjective:      History of Present Illness:  Adriana Strong is a 75 y.o. white female Who has a PMH of aortic stenosis s/p TAVR on 04/19, CVA/TIA, AFib, diabetes myelitis, HTN, asthma, cataracts, GERD, HLD, irritable bowel syndrome who presents to the ED on 10/19/20 with chief complaint of shortness of breath.    Patient states that she initially started experiencing symptoms of cough, shortness of breath, and chest tightness on this past Tuesday.  At this time, she went to her PCP (Dr. Mcgraw) who prescribed her prednisone 10 mg once a day.  She states that her symptoms did not improve and so she re-presented to her PCP on Friday where she was given a prednisone shot and a prescription for 20 mg of prednisone BID in addition to Tessalon Perles.  She states that over the weekend, she was compliant with her prednisone and Tessalon Perles but did not help relieve her shortness of breath or cough.  She states that she has approximately 2 asthma exacerbations a year and reports that this episode is similar to her previous asthma exacerbations in the past.  She states that she has a rescue inhaler in addition to an albuterol nebulizer which she has been using with no relief of her symptoms.  She also states that, for her asthma, she uses a Symbicort inhaler in addition to zafirlukast 20 mg daily.  For on presentation the ED patient was tachycardic to 151 with a respiratory rate of 30 but satting 99% on room air.  While in the ED she was given albuterol nebulizer solution for 1 hour in addition to methylprednisolone 125 mg with little improvement in her symptoms.  Furthermore, she was also given 35 mg  of diltiazem for her RVR.  Patient was discussed with cardiology who feels the heart rate will gradually decrease as patient's respiratory status improves.    Past Medical History:  Past Medical History:   Diagnosis Date    Acute on chronic diastolic (congestive) heart failure 11/20/2019    Anticoagulant long-term use     on Plavix since May 2015    Anxiety     Arthritis     Asthma     Atrial fibrillation     Cataracts, bilateral     Chest pain, atypical     Colon polyp     Coronary artery disease     Diabetes mellitus     Dry eyes     Esophageal erosions     GERD (gastroesophageal reflux disease)     Heart murmur     Hemorrhoid     High cholesterol     Hypertension     Irritable bowel syndrome     Shingles 2015    Stenosis of aortic and mitral valves     Stroke     TIA (transient ischemic attack)     Use of cane as ambulatory aid        Past Surgical History:  Past Surgical History:   Procedure Laterality Date    ABDOMINAL SURGERY      stents to SMA    angiocele      2007, 2014    AORTIC VALVE REPLACEMENT N/A 11/19/2019    Procedure: Replacement-valve-aortic;  Surgeon: Jack Capone MD;  Location: [facility] CATH LAB;  Service: Cardiology;  Laterality: N/A;    BREAST SURGERY      left--- a lump--- no cancer    CARDIAC VALVE SURGERY      COLONOSCOPY  2014    COLONOSCOPY N/A 3/14/2018    Procedure: COLONOSCOPY;  Surgeon: Efren Kemp MD;  Location: [facility] ENDO ([address] [place] [address]);  Service: Endoscopy;  Laterality: N/A;  ok to hold Plavix 5 days prior to procedure per Dr RESHMA Hernandez     ok per Dr Kemp to hold Eliquis 2 days prior to procedure (see telephone encounter dated-2/7/18)    HERNIA REPAIR      HYSTERECTOMY  partial    1982 partial hysterectomy    LEFT HEART CATHETERIZATION Right 11/5/2019    Procedure: Left heart cath;  Surgeon: Jack Capone MD;  Location: [facility] CATH LAB;  Service: Cardiology;  Laterality: Right;    left nasal polyp      left neck lymph node      nose polyp      right hip  fatty mass tissue      stent to small intestine      SUPERIOR VENA CAVA ANGIOPLASTY / STENTING      TONSILLECTOMY      TOTAL ABDOMINAL HYSTERECTOMY      2014    TOTAL KNEE ARTHROPLASTY Bilateral     TREATMENT OF CARDIAC ARRHYTHMIA N/A 2/10/2020    Procedure: CARDIOVERSION;  Surgeon: Jayy Parrish MD;  Location: [facility] EP LAB;  Service: Cardiology;  Laterality: N/A;  AF, PEDRO, DCCV, MAC, DM, 3 Prep    TREATMENT OF CARDIAC ARRHYTHMIA N/A 5/15/2020    Procedure: CARDIOVERSION;  Surgeon: Hany Bee MD;  Location: [facility] EP LAB;  Service: Cardiology;  Laterality: N/A;  AF, PEDRO, DCCV, MAC, DM, 3 Prep    UPPER GASTROINTESTINAL ENDOSCOPY  2014       Allergies:  Review of patient's allergies indicates:   Allergen Reactions    Celebrex [celecoxib] Shortness Of Breath    Ciprofloxacin Swelling     lip    Dicyclomine Hives    Adhesive Dermatitis    Avelox [moxifloxacin] Swelling    Cilostazol Other (See Comments)     Elevates blood pressure    Eryc [erythromycin] Other (See Comments)     unknown    Iodine and iodide containing products Hives    Keflex [cephalexin] Hives    Meclomen      rashes    Meloxicam      Ear ringing    Metoclopramide Other (See Comments)     High blood pressure    Sulfa (sulfonamide antibiotics) Itching       Home Medications:  Prior to Admission medications    Medication Sig Start Date End Date Taking? Authorizing Provider   acetaminophen (TYLENOL ARTHRITIS) 650 MG TbSR Take 1,300 mg by mouth 2 (two) times daily.    Historical Provider   albuterol (ACCUNEB) 1.25 mg/3 mL Nebu Take scheduled q4 for the next two days while at home then resume prn dosing 1/6/17   Kalyn Berman MD   albuterol (VENTOLIN HFA) 90 mcg/actuation inhaler Inhale 2 puffs into the lungs every 6 (six) hours as needed for Wheezing. Rescue    Historical Provider   apixaban (ELIQUIS) 5 mg Tab Take 1 tablet (5 mg total) by mouth 2 (two) times daily. 7/23/20   Jr Carrington MD   ascorbic acid (VITAMIN C) 500 MG  tablet Take 1,000 mg by mouth once daily.     Historical Provider   atorvastatin (LIPITOR) 10 MG tablet Take 1 tablet (10 mg total) by mouth once daily. 7/23/20 7/23/21  Jr Carrington MD   azelastine (ASTELIN) 137 mcg nasal spray 1 spray by Nasal route 2 (two) times daily.    Historical Provider   bisacodyl (DULCOLAX) 5 mg EC tablet Take 5 mg by mouth daily as needed for Constipation.    Historical Provider   budesonide-formoterol 160-4.5 mcg (SYMBICORT) 160-4.5 mcg/actuation HFAA Inhale 2 puffs into the lungs every 12 (twelve) hours. Controller    Historical Provider   calcium carbonate (OS-MICHAEL) 600 mg (1,500 mg) Tab Take 1,200 mg by mouth once daily.    Historical Provider   calcium carbonate/vitamin D3 (CALTRATE 600 + D ORAL) Take by mouth.    Historical Provider   ciclopirox 1 % shampoo Apply topically every evening.    Historical Provider   cimetidine (TAGAMET) 200 MG tablet Take 300 mg by mouth.     Historical Provider   clobetasoL (TEMOVATE) 0.05 % external solution Apply topically 2 (two) times daily.    Historical Provider   clopidogrel (PLAVIX) 75 mg tablet Take 1 tablet (75 mg total) by mouth once daily. 1/13/20 1/7/21  Lucretia Stark MD   cyanocobalamin (VITAMIN B-12) 1000 MCG tablet Take 100 mcg by mouth once daily.    Historical Provider   diltiaZEM (CARDIZEM CD) 240 MG 24 hr capsule Take 1 capsule (240 mg total) by mouth once daily. 9/1/20 9/1/21  Lissette Chacon NP   docusate sodium (COLACE) 100 MG capsule Take 100 mg by mouth 2 (two) times daily.    Historical Provider   DULoxetine (CYMBALTA) 20 MG capsule TAKE 1 CAPSULE BY MOUTH EVERY DAY 7/10/20   Sean Saravia MD   fexofenadine (ALLEGRA) 60 MG tablet Take 180 mg by mouth every morning.     Historical Provider   fish oil-omega-3 fatty acids 300-1,000 mg capsule Take 1 g by mouth once daily.     Historical Provider   fluconazole (DIFLUCAN) 150 MG Tab TAKE 1 TABLET (150 MG TOTAL) BY MOUTH ONCE. FOR 1 DOSE 11/5/19   Historical Provider    fluticasone (FLONASE) 50 mcg/actuation nasal spray 1 spray by Each Nare route every morning.     Historical Provider   folic acid/multivit-min/lutein (CENTRUM SILVER ORAL) Take by mouth.    Historical Provider   furosemide (LASIX) 40 MG tablet TAKE 1 TABLET BY MOUTH EVERY DAY 1/14/20   Jr Carrington MD   gabapentin (NEURONTIN) 300 MG capsule Take 2 capsules (600 mg total) by mouth 3 (three) times daily. 10/24/19   Sean Saravia MD   hydroCHLOROthiazide (MICROZIDE) 12.5 mg capsule Take 1 capsule (12.5 mg total) by mouth once daily. 7/23/20 7/23/21  Jr Carrington MD   isosorbide mononitrate (IMDUR) 30 MG 24 hr tablet Take 1 tablet (30 mg total) by mouth once daily. 10/1/20 10/1/21  Jr Carrington MD   lisinopriL (PRINIVIL,ZESTRIL) 20 MG tablet Take 1 tablet (20 mg total) by mouth once daily. 9/17/20   Jr Carrington MD   lorazepam (ATIVAN) 0.5 MG tablet Take 0.5 mg by mouth every morning.     Historical Provider   losartan (COZAAR) 50 MG tablet Take 1 tablet (50 mg total) by mouth 2 (two) times daily.  Patient not taking: Reported on 9/1/2020 6/29/20   Lissette Chacon NP   magnesium 250 mg Tab Take 250 mg by mouth. 3 in am and 3 pm    Historical Provider   metFORMIN (GLUCOPHAGE) 500 MG tablet Take 500 mg by mouth 2 (two) times daily. 7/16/19   Historical Provider   mometasone 0.1% (ELOCON) 0.1 % cream Apply topically once daily.    Historical Provider   montelukast (SINGULAIR) 10 mg tablet Take 10 mg by mouth every evening.    Historical Provider   nitroGLYCERIN (NITROSTAT) 0.4 MG SL tablet Place 1 tablet (0.4 mg total) under the tongue every 5 (five) minutes as needed for Chest pain. 11/21/19 11/20/20  Hany Matthew MD   nystatin (MYCOSTATIN) powder Apply topically 4 (four) times daily. 10/28/19   Nikko Merritt MD   ondansetron (ZOFRAN-ODT) 4 MG TbDL DISSOLVE 1 TABLET ON TONGUE EVERY 8 HOURS AS NEEDED FOR NAUSEA 10/15/18   Historical Provider   oxybutynin (DITROPAN-XL) 5 MG  TR24 Take 5 mg by mouth every other day.  10/19/15   Historical Provider   pantoprazole (PROTONIX) 40 MG tablet Take 1 tablet (40 mg total) by mouth once daily.  Patient taking differently: Take 40 mg by mouth every morning.  12/2/15   Efren Kemp MD   polyethylene glycol (GLYCOLAX) 17 gram PwPk Take by mouth.    Historical Provider   POLYSORBATE 80/GLYCERIN (REFRESH DRY EYE THERAPY OPHT) Apply to eye 2 (two) times daily.    Historical Provider   potassium gluconate 550 mg (90 mg) Tab Take by mouth. 2 in am and 2 pm    Historical Provider   traMADol (ULTRAM) 50 mg tablet Take 1 tablet (50 mg total) by mouth every 6 (six) hours as needed for Pain. 1/10/19   Jay Pandey MD   triamcinolone acetonide 0.1% (KENALOG) 0.1 % cream Apply topically 2 (two) times daily.    Historical Provider   vitamin D (VITAMIN D3) 1000 units Tab Take 2,000 Units by mouth once daily.    Historical Provider   zafirlukast (ACCOLATE) 20 MG tablet Take 20 mg by mouth once daily. 18   Historical Provider       Family History:  Family History   Problem Relation Age of Onset    Heart attack Mother     Stroke Father     Heart failure Brother     Colon cancer Neg Hx     Inflammatory bowel disease Neg Hx        Social History:  Social History     Tobacco Use    Smoking status: Never Smoker    Smokeless tobacco: Never Used   Substance Use Topics    Alcohol use: No    Drug use: No       Review of Systems:  Pertinent items are noted in HPI. All other systems are reviewed and are negative.    Health Maintaince :   Primary Care Physician: Krzysztof Mcgraw MD    Immunizations:   TDap UTP per patient    Flu UTP per patient   Pna UTP per patient    Cancer Screening:  PAP: UTP per patient  MMG: UTP per patient; could not find any record of mamogram  Colonoscopy: UTP 3/14/18     Objective:   Last 24 Hour Vital Signs:  BP  Min: 129/75  Max: 218/102  Temp  Av.2 °F (37.3 °C)  Min: 99.2 °F (37.3 °C)  Max: 99.2 °F (37.3 °C)  Pulse  Av.7   "Min: 118  Max: 151  Resp  Av  Min: 22  Max: 30  SpO2  Av.5 %  Min: 96 %  Max: 99 %  Height  Av' 4" (162.6 cm)  Min: 5' 4" (162.6 cm)  Max: 5' 4" (162.6 cm)  Weight  Av.1 kg (245 lb)  Min: 111.1 kg (245 lb)  Max: 111.1 kg (245 lb)  Body mass index is 42.05 kg/m².  No intake/output data recorded.    Physical Examination:  Gen: NAD, well appearing, alert, interactive, A&Ox3  HEENT: NC/AT, PERRL, EOMI, good conjugate gaze, MMM  NECK: Supple, No LAD, Normal ROM  CV: Tachycardic  Resp: CTAB, normal WOB, diffuse expiratory wheezing in all lung fields  Abd: soft, ND/NT, normal bowel sounds, no masses  Ext: normal tone and ROM, strength and sensation intact, cap refill <2s, 2+ dp equal bl  Neuro: A&Ox3, CN II to XII intact, reflex symmetric, sensation normal  Skin: intact, no rashes, no lesions, no erythema       Laboratory:  Most Recent Data:  CBC:   Lab Results   Component Value Date    WBC 19.51 (H) 10/19/2020    HGB 15.0 10/19/2020    HCT 44.0 10/19/2020     (H) 10/19/2020    MCV 92 10/19/2020    RDW 14.4 10/19/2020     BMP:   Lab Results   Component Value Date     (L) 10/19/2020    K 4.2 10/19/2020    CL 90 (L) 10/19/2020    CO2 30 (H) 10/19/2020    BUN 20 10/19/2020    CREATININE 0.9 10/19/2020    GLU 96 10/19/2020    CALCIUM 10.1 10/19/2020    MG 1.8 10/19/2020    PHOS 2.9 2017     LFTs:   Lab Results   Component Value Date    PROT 8.3 10/19/2020    ALBUMIN 4.3 10/19/2020    BILITOT 0.5 10/19/2020    AST 45 (H) 10/19/2020    ALKPHOS 47 (L) 10/19/2020    ALT 55 (H) 10/19/2020     Coags:   Lab Results   Component Value Date    INR 1.0 10/19/2020     FLP:   Lab Results   Component Value Date    CHOL 161 2020    HDL 75 2020    LDLCALC 75.2 2020    TRIG 54 2020    CHOLHDL 46.6 2020     DM:   Lab Results   Component Value Date    HGBA1C 6.5 (H) 2019    HGBA1C 7.0 (H) 2019    HGBA1C 6.7 (H) 2017    LDLCALC 75.2 2020    CREATININE 0.9 " 10/19/2020     Thyroid:   Lab Results   Component Value Date    TSH 4.180 (H) 08/25/2020    FREET4 0.96 08/25/2020     Anemia:   Lab Results   Component Value Date    IRON 42 (L) 08/24/2016    TIBC 462 (H) 08/24/2016    FERRITIN 16 (L) 07/01/2016    MRZQAAZS30 846 02/19/2019    FOLATE >24.0 08/24/2016     Cardiac:   Lab Results   Component Value Date    TROPONINI 0.008 10/19/2020    BNP 90 10/19/2020     Urinalysis:   Lab Results   Component Value Date    LABURIN ESCHERICHIA COLI  >100,000 cfu/ml   01/10/2019    COLORU LT. RED 01/10/2019    SPECGRAV 1.015 01/10/2019    NITRITE Negative 01/10/2019    KETONESU Negative 01/10/2019    UROBILINOGEN Negative 01/10/2019    WBCUA 50 (H) 01/10/2019       Trended Lab Data:  Recent Labs   Lab 10/19/20  0942   WBC 19.51*   HGB 15.0   HCT 44.0   *   MCV 92   RDW 14.4   *   K 4.2   CL 90*   CO2 30*   BUN 20   CREATININE 0.9   GLU 96   PROT 8.3   ALBUMIN 4.3   BILITOT 0.5   AST 45*   ALKPHOS 47*   ALT 55*       Trended Cardiac Data:  Recent Labs   Lab 10/19/20  0942   TROPONINI 0.008   BNP 90     Microbiology Data:  none    Other Results:  EKG (my interpretation): sinus tachycardia. ST less depressed in Inferior leads. ST now depressed in Lateral leads. Nonspecific T wave abnormality no longer evident in Inferior leads. T wave inversion now evident in Lateral leads    Radiology:  X-Ray Chest AP Portable   Final Result      No obvious evidence of an acute pulmonary process.         Electronically signed by: Krzysztof Alcantara   Date:    10/19/2020   Time:    10:52           Assessment:     Adriana Strong is a 75 y.o. white female Who has a PMH of aortic stenosis s/p TAVR on 04/19, CVA/TIA, AFib, diabetes myelitis, HTN, asthma, cataracts, GERD, HLD, irritable bowel syndrome who presents to the ED on 10/19/20 with chief complaint of shortness of breath.  Given 1 hr treatment of albuterol nebulizer solution in addition to methylprednisolone 125 mg in the ED with little  improvement in her shortness of breath.  Additionally, heart rate was elevated to 150 on presentation and was given 35 mg of diltiazem with brief improvement in her heart rate.  Per cardiology, patient's tachycardia should improve with improvement in her respiratory status.  Patient will be admitted for continued breathing treatments and steroids.     Plan:     Acute on chronic asthma exacerbation  History of asthma with approximately 2 exacerbations per year presenting with cough, shortness of breath, chest tightness for approximately 1 week that has not improved on oral steroids or with home breathing treatments  - Received nebulized albuterol for 1 hr in addition to 125 mg of methylprednisolone in the ED with little improvement in shortness of breath  - Chest x-ray with no acute cardiopulmonary process although questionable hazy opacity and left lower lobe.  - Repeat chest x-ray PA and lateral  - DuoNebs q.4 hours  - Methylprednisolone 60 mg  - Patient was previously on montelukast 10 mg but has since switched to zafirlukast.  Will continue patient on montelukast as zafirlukast is not carried here    Leukocytosis in the setting of recent steroid use  - White count on admit 19.5  - Patient was initially started on prednisone 10 mg this past Tuesday which was increased to 20 mg on Friday  - No fever, chills, sputum production, or other signs and symptoms of infection  - Likely 2/2 steroid use  - Will continue to monitor    Chronic AFib with RVR  - On presentation to the ED patient had a heart rate of 150; patient currently denies any palpitations, reports some lightheadedness on admit  - EKG with sinus tachycardia but appears to be in AFib on tele in the ED  - Will repeat EKG  - Received 35 mg of diltiazem IV in the ED with temporary improvement in heart rate  - Patient discussed with Cardiology, HR will likely improve with improvement in her respiratory status  - Continue home Eliquis 5 mg and diltiazem 240 mg  -  Follows with Dr. Bee and Dr. Carrington   - Patient has appointment scheduled with Dr. Carrington on 12/08/20    Dysuria  Patient complaining of dysuria and increased frequency that has started this week.   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 Bee and Dr. Carrington  - Patient has appointment scheduled with Dr. Carrington on 12/08/20    Type 2 diabetes myelitis  - On metformin 500 mg BID  will hold while inpatient  - Sliding scale insulin  - Accu-Cheks    HTN  - Patient currently on HCTZ 12.5 mg, Imdur 30 mg, lisinopril 20 mg will continue inpatient  - Patient also on losartan 50 mg BID, unclear why patient is on both an Ace and an ARB, will hold for now    GERD  - Continue Protonix 40 mg daily    Chronic constipation  - Continue Dulcolax 5 mg and polyethylene glycol 17 mg    HLD  - Continue atorvastatin 10 mg    Anxiety  - Continue duloxetine 20 mg    Chronic back pain  - Continue gabapentin 6 on did mg TID      Diet: Diabetic  DVT PPX: Eliquis     Dispo: Pending symptotic improvement and resolution of symptoms  Code: Full    Ishaan Zamorano MD  [facility] Internal Medicine HO-I    [facility] Medicine Hospitalist Pager numbers:   [facility] Hospitalist Medicine Team A (Rajinder/Isis): [phone]  [facility] Hospitalist Medicine Team B (Antoni/Carleen):  [phone]

## 2020-10-19 NOTE — FIRST PROVIDER EVALUATION
Emergency Department TeleTriage Encounter Note      CHIEF COMPLAINT    Chief Complaint   Patient presents with    Shortness of Breath     c/o asthma exacerbation x1 week, with increasing SOB, chest tightness, and cough.       VITAL SIGNS   Initial Vitals [10/19/20 0923]   BP Pulse Resp Temp SpO2   (!) 218/102 (!) 150 (!) 30 99.2 °F (37.3 °C) 99 %      MAP       --            ALLERGIES    Review of patient's allergies indicates:   Allergen Reactions    Celebrex [celecoxib] Shortness Of Breath    Ciprofloxacin Swelling     lip    Dicyclomine Hives    Adhesive Dermatitis    Avelox [moxifloxacin] Swelling    Cilostazol Other (See Comments)     Elevates blood pressure    Eryc [erythromycin] Other (See Comments)     unknown    Iodine and iodide containing products Hives    Keflex [cephalexin] Hives    Meclomen      rashes    Meloxicam      Ear ringing    Metoclopramide Other (See Comments)     High blood pressure    Sulfa (sulfonamide antibiotics) Itching       PROVIDER TRIAGE NOTE  75-year-old female presents to the ED for evaluation of shortness of breath, progressively worsening over last week.  Patient thinks this may be secondary to asthma exacerbation.  Did not use her inhaler or nebulizer today.  She complains of cough, mild chest discomfort.  She is on Plavix and aspirin    Initial orders will be placed and care will be transferred to an alternate provider when patient is roomed for a full evaluation. Any additional orders and the final disposition will be determined by that provider.      ORDERS  Labs Reviewed   APTT   CBC W/ AUTO DIFFERENTIAL   COMPREHENSIVE METABOLIC PANEL   B-TYPE NATRIURETIC PEPTIDE   MAGNESIUM   TROPONIN I   PROTIME-INR   SARS-COV-2 RDRP GENE       ED Orders (720h ago, onward)    Start Ordered     Status Ordering Provider    10/19/20 1000 10/19/20 0941  Vital Signs  Every 2 hours      Ordered KRANTHI ANDERSON    10/19/20 0942 10/19/20 0941  POCT COVID-19 Rapid Screening  Once       Ordered JOHNYKUTTY, KRANTHI    10/19/20 0942 10/19/20 0941  Airborne and Contact and Droplet Isolation Status  Continuous      Ordered JOHNYKUTTY, KRANTHI    10/19/20 0942 10/19/20 0941  Protime-INR  STAT  Collect    Ordered JOHNYKUTTY, KRANTHI    10/19/20 0940 10/19/20 0941  APTT  STAT  Collect    Ordered JOHNYKUTTY, KRANTHI    10/19/20 0940 10/19/20 0941  CBC auto differential  STAT  Collect    Ordered JOHNYKUTTY, KRANTHI    10/19/20 0940 10/19/20 0941  Comprehensive metabolic panel  STAT  Collect    Ordered JOHNYKUTTY, KRANTIH    10/19/20 0940 10/19/20 0941  Brain natriuretic peptide  STAT  Collect    Ordered JOHNYKUTTY, KRANTHI    10/19/20 0940 10/19/20 0941  Magnesium  STAT  Collect    Ordered JOHNYKUTTY, KRANTHI    10/19/20 0940 10/19/20 0941  Troponin I  STAT  Collect    Ordered JOHNYKUTTY, KRANTHI    10/19/20 0940 10/19/20 0941  Insert Saline lock IV  Once      Ordered JOHNYKUTTY, KRANTHI    10/19/20 0940 10/19/20 0941  EKG 12-lead  Once      Ordered JOHNYKUTTY, KRANTHI    10/19/20 0940 10/19/20 0941  Cardiac Monitoring - Adult  Continuous     Comments: Notify Physician If:    Ordered JOSEPHINEYKUTTY, KRANTHI    10/19/20 0940 10/19/20 0941  Pulse Oximetry Continuous  Continuous      Ordered JOHNYKUTTY, KRANTHI    10/19/20 0940 10/19/20 0941  X-Ray Chest AP Portable  1 time imaging      Ordered JUSTIN, KRANTHI            Virtual Visit Note: The provider triage portion of this emergency department evaluation and documentation was performed via [employer], a HIPAA-compliant telemedicine application, in concert with a tele-presenter in the room. A face to face patient evaluation with one of my colleagues will occur once the patient is placed in an emergency department room.      DISCLAIMER: This note was prepared with [employer] voice recognition transcription software. Garbled syntax, mangled pronouns, and other bizarre constructions may be attributed to that software system.

## 2020-10-19 NOTE — ED NOTES
Present to ED with SOB and coughing. Reports increasing SOB, chest tightness andcough x 1 week. Reportsmostly dry nonproductive cough but occassionally coughs up small amount of thick white sputum. Able to complete full sentence. No acute distress.

## 2020-10-19 NOTE — NURSING
"Cued into room to do admit questions. Patient is not in the room. The family stated "She is in xray." Will revisit the room at a later time and hopefully patient will be back from xray  "

## 2020-10-19 NOTE — MEDICAL/APP STUDENT
[facility] Medicine H&P Note     Admitting Team: [facility] Hospitalist Team A  Attending Physician: Zunilda Marinelli MD  Resident: Josh  Intern: Jaun    Date of Admit: 10/19/2020    Chief Complaint     Shortness of breath, chest discomfort, dry cough x 6 days    Subjective:      History of Present Illness:  Adriana Strong is a 76 yo female with a past medical history of asthma, chronic diastolic HF, atrial fibrillation on apixaban, atrial stenosis s/p TAVR (11/2019), CAD, HTN, HLD, T2DM, stroke, GERD, anxiety, and arthritis who presented to [facility] on 10/19/2020 with a complaint of shortness of breath, cough, and chest discomfort for 6 days.     The patient was in their usual state of health until Tuesday (10/13/2020) when she reported onset of SOB, intermittent palpitations, chest discomfort described as an ache, and a predominantly dry cough which prompted her to visit her PCP who started her on prednisone 10 mg QD with no relief. She returned to her PCP on Friday where she was given a prednisone injection and a prescription for prednisone 20 mg BID. Patient reported no improvement in symptoms with the new regimen or home breathing treatments. She reported similar episodes in the past, occurring approximately twice a year. Additionally, patient reported recent onset of dysuria, no fever, chills, or recent illness.    On admission, patient was hypertensive at 218/102 that resolved within minutes to SBP 140s, tachycardic at 150, with SpO2 99%. Notable findings included CXR with left lower lobe haziness, normal troponin and BNP, a leukocytosis 19.51, negative Covid result and a transaminitis. Initial management included a breathing treatment, IV methylprednisolone 125, and IV diltiazem (15 and 20 mg). Patient reported minimal relief after the breathing treatment and her HR remained in the 140s.    At bedside, patient alternated between sinus tachycardia and atrial fibrillation with RVR with HR  120s-130s.    Past Medical History:  Diastolic Heart Failure  Anxiety  Arthritis  Asthma  Atrial Fibrillation  CAD  T2DM  GERD  HTN  Stroke  IBS    Past Surgical History:  Past Surgical History:   Procedure Laterality Date    ABDOMINAL SURGERY      stents to SMA    angiocele      2007, 2014    AORTIC VALVE REPLACEMENT N/A 11/19/2019    Procedure: Replacement-valve-aortic;  Surgeon: Jack Capone MD;  Location: [facility] CATH LAB;  Service: Cardiology;  Laterality: N/A;    BREAST SURGERY      left--- a lump--- no cancer    CARDIAC VALVE SURGERY      COLONOSCOPY  2014    COLONOSCOPY N/A 3/14/2018    Procedure: COLONOSCOPY;  Surgeon: Efren Kemp MD;  Location: [facility] ENDO (4TH FLR);  Service: Endoscopy;  Laterality: N/A;  ok to hold Plavix 5 days prior to procedure per Dr RESHMA Hernandez     ok per Dr Kemp to hold Eliquis 2 days prior to procedure (see telephone encounter dated-2/7/18)    HERNIA REPAIR      HYSTERECTOMY  partial    1982 partial hysterectomy    LEFT HEART CATHETERIZATION Right 11/5/2019    Procedure: Left heart cath;  Surgeon: Jack Capone MD;  Location: [facility] CATH LAB;  Service: Cardiology;  Laterality: Right;    left nasal polyp      left neck lymph node      nose polyp      right hip fatty mass tissue      stent to small intestine      SUPERIOR VENA CAVA ANGIOPLASTY / STENTING      TONSILLECTOMY      TOTAL ABDOMINAL HYSTERECTOMY      2014    TOTAL KNEE ARTHROPLASTY Bilateral     TREATMENT OF CARDIAC ARRHYTHMIA N/A 2/10/2020    Procedure: CARDIOVERSION;  Surgeon: Jayy Parrish MD;  Location: [facility] EP LAB;  Service: Cardiology;  Laterality: N/A;  AF, PEDRO, DCCV, MAC, DM, 3 Prep    TREATMENT OF CARDIAC ARRHYTHMIA N/A 5/15/2020    Procedure: CARDIOVERSION;  Surgeon: Hany Bee MD;  Location: [facility] EP LAB;  Service: Cardiology;  Laterality: N/A;  AF, PEDRO, DCCV, MAC, DM, 3 Prep    UPPER GASTROINTESTINAL ENDOSCOPY  2014       Allergies:  Review of patient's allergies indicates:   Allergen  Reactions    Celebrex [celecoxib] Shortness Of Breath    Ciprofloxacin Swelling     lip    Dicyclomine Hives    Adhesive Dermatitis    Avelox [moxifloxacin] Swelling    Cilostazol Other (See Comments)     Elevates blood pressure    Eryc [erythromycin] Other (See Comments)     unknown    Iodine and iodide containing products Hives    Keflex [cephalexin] Hives    Meclomen      rashes    Meloxicam      Ear ringing    Metoclopramide Other (See Comments)     High blood pressure    Sulfa (sulfonamide antibiotics) Itching       Home Medications:  Prior to Admission medications    Medication Sig Start Date End Date Taking? Authorizing Provider   acetaminophen (TYLENOL ARTHRITIS) 650 MG TbSR Take 1,300 mg by mouth 2 (two) times daily.    Historical Provider   albuterol (ACCUNEB) 1.25 mg/3 mL Nebu Take scheduled q4 for the next two days while at home then resume prn dosing 1/6/17   Kalyn Berman MD   albuterol (VENTOLIN HFA) 90 mcg/actuation inhaler Inhale 2 puffs into the lungs every 6 (six) hours as needed for Wheezing. Rescue    Historical Provider   apixaban (ELIQUIS) 5 mg Tab Take 1 tablet (5 mg total) by mouth 2 (two) times daily. 7/23/20   Jr Carrington MD   ascorbic acid (VITAMIN C) 500 MG tablet Take 1,000 mg by mouth once daily.     Historical Provider   atorvastatin (LIPITOR) 10 MG tablet Take 1 tablet (10 mg total) by mouth once daily. 7/23/20 7/23/21  Jr Carrington MD   azelastine (ASTELIN) 137 mcg nasal spray 1 spray by Nasal route 2 (two) times daily.    Historical Provider   bisacodyl (DULCOLAX) 5 mg EC tablet Take 5 mg by mouth daily as needed for Constipation.    Historical Provider   budesonide-formoterol 160-4.5 mcg (SYMBICORT) 160-4.5 mcg/actuation HFAA Inhale 2 puffs into the lungs every 12 (twelve) hours. Controller    Historical Provider           calcium carbonate/vitamin D3 (CALTRATE 600 + D ORAL) Take by mouth.    Historical Provider                           clopidogrel  (PLAVIX) 75 mg tablet Take 1 tablet (75 mg total) by mouth once daily. 1/13/20 1/7/21  Lucretia Stark MD   cyanocobalamin (VITAMIN B-12) 1000 MCG tablet Take 100 mcg by mouth once daily.    Historical Provider   diltiaZEM (CARDIZEM CD) 240 MG 24 hr capsule Take 1 capsule (240 mg total) by mouth once daily. 9/1/20 9/1/21  Lissette Chacon NP           DULoxetine (CYMBALTA) 20 MG capsule TAKE 1 CAPSULE BY MOUTH EVERY DAY 7/10/20   Sean Saravia MD   fexofenadine (ALLEGRA) 60 MG tablet Take 180 mg by mouth every morning.     Historical Provider   fish oil-omega-3 fatty acids 300-1,000 mg capsule Take 1 g by mouth once daily.     Historical Provider           fluticasone (FLONASE) 50 mcg/actuation nasal spray 1 spray by Each Nare route every morning.     Historical Provider   folic acid/multivit-min/lutein (CENTRUM SILVER ORAL) Take by mouth.    Historical Provider   furosemide (LASIX) 40 MG tablet TAKE 1 TABLET BY MOUTH EVERY DAY 1/14/20   Jr Carrington MD   gabapentin (NEURONTIN) 300 MG capsule Take 2 capsules (600 mg total) by mouth 3 (three) times daily. 10/24/19   Sean Saravia MD   hydroCHLOROthiazide (MICROZIDE) 12.5 mg capsule Take 1 capsule (12.5 mg total) by mouth once daily. 7/23/20 7/23/21  Jr Carrington MD   isosorbide mononitrate (IMDUR) 30 MG 24 hr tablet Take 1 tablet (30 mg total) by mouth once daily. 10/1/20 10/1/21  Jr Carrington MD   lisinopriL (PRINIVIL,ZESTRIL) 20 MG tablet Take 1 tablet (20 mg total) by mouth once daily. 9/17/20   Jr Carrington MD   lorazepam (ATIVAN) 0.5 MG tablet Take 0.5 mg by mouth every morning.     Historical Provider   losartan (COZAAR) 50 MG tablet Take 1 tablet (50 mg total) by mouth 2 (two) times daily.  Patient not taking: Reported on 9/1/2020 6/29/20   Lissette Chacon, AMY   magnesium 250 mg Tab Take 250 mg by mouth. 3 in am and 3 pm    Historical Provider   metFORMIN (GLUCOPHAGE) 500 MG tablet Take 500 mg by mouth 2 (two) times  daily. 19   Historical Provider                   nitroGLYCERIN (NITROSTAT) 0.4 MG SL tablet Place 1 tablet (0.4 mg total) under the tongue every 5 (five) minutes as needed for Chest pain. 19  Hany Matthew MD           ondansetron (ZOFRAN-ODT) 4 MG TbDL DISSOLVE 1 TABLET ON TONGUE EVERY 8 HOURS AS NEEDED FOR NAUSEA 10/15/18   Historical Provider   oxybutynin (DITROPAN-XL) 5 MG TR24 Take 5 mg by mouth every other day.  10/19/15   Historical Provider   pantoprazole (PROTONIX) 40 MG tablet Take 1 tablet (40 mg total) by mouth once daily.  Patient taking differently: Take 40 mg by mouth every morning.  12/2/15   Efren Kemp MD   polyethylene glycol (GLYCOLAX) 17 gram PwPk Take by mouth.    Historical Provider   POLYSORBATE 80/GLYCERIN (REFRESH DRY EYE THERAPY OPHT) Apply to eye 2 (two) times daily.    Historical Provider   potassium gluconate 550 mg (90 mg) Tab Take by mouth. 2 in am and 2 pm    Historical Provider   traMADol (ULTRAM) 50 mg tablet Take 1 tablet (50 mg total) by mouth every 6 (six) hours as needed for Pain. 1/10/19   Jay Pandey MD                   zafirlukast (ACCOLATE) 20 MG tablet Take 20 mg by mouth once daily. 18   Historical Provider       Family History:  Family History   Problem Relation Age of Onset    Heart attack Mother     Stroke Father     Heart failure Brother     Colon cancer Neg Hx     Inflammatory bowel disease Neg Hx        Social History:  Social History     Tobacco Use    Smoking status: Never Smoker    Smokeless tobacco: Never Used   Substance Use Topics    Alcohol use: No    Drug use: No       Review of Systems:  All other systems are reviewed and are negative.    Health Maintaince :   Primary Care Physician: Krzysztof Mcgraw  Immunizations:   TDap up to date  Flu up to date  Pna up to date    Cancer Screening:  PAP: up to date  MM2018  Colonoscopy: 3/14/2018     Objective:   Last 24 Hour Vital Signs:  BP  Min: 129/75  Max:  "218/102  Temp  Av.2 °F (37.3 °C)  Min: 99.2 °F (37.3 °C)  Max: 99.2 °F (37.3 °C)  Pulse  Av.7  Min: 118  Max: 151  Resp  Av  Min: 22  Max: 30  SpO2  Av.5 %  Min: 96 %  Max: 99 %  Height  Av' 4" (162.6 cm)  Min: 5' 4" (162.6 cm)  Max: 5' 4" (162.6 cm)  Weight  Av.1 kg (245 lb)  Min: 111.1 kg (245 lb)  Max: 111.1 kg (245 lb)  Body mass index is 42.05 kg/m².  No intake/output data recorded.    Physical Examination:  BP (!) 123/95   Pulse 109   Temp 99.2 °F (37.3 °C) (Oral)   Resp 18   Ht 5' 4" (1.626 m)   Wt 111.1 kg (245 lb)   LMP 1973 (Approximate)   SpO2 (!) 94%   BMI 42.05 kg/m²   General appearance: Awake, alert, obese female. Appears stated age. Cooperative with appropriate eye contact. No acute distress.  Head: Normocephalic, atraumatic. No obvious deformity.  Neck: No JVD. Supple, symmetrical midline trachea.  Lungs: No visible retractions. Diminished breaths sounds. Wheezing throughout. No crackles.   Heart: Tachycardic 122, irregular rhythm. S1 and S2 present. No click, rub, or gallop.   Abdomen: Soft, non-distended. Non-tender. Bowel sounds throughout.   Extremities: Extremities normal, atraumatic. No cyanosis, trace edema to BLEs.  Pulses: 2+ and symmetric radial pulses. 2+ and symmetric distal pulses.  Skin: Skin pale pink. No rashes or lesions. Diffuse dilated vessels to BLEs.  Laboratory:  Most Recent Data:  CBC:   Lab Results   Component Value Date    WBC 19.51 (H) 10/19/2020    HGB 15.0 10/19/2020    HCT 44.0 10/19/2020     (H) 10/19/2020    MCV 92 10/19/2020    RDW 14.4 10/19/2020     BMP:   Lab Results   Component Value Date     (L) 10/19/2020    K 4.2 10/19/2020    CL 90 (L) 10/19/2020    CO2 30 (H) 10/19/2020    BUN 20 10/19/2020    CREATININE 0.9 10/19/2020    GLU 96 10/19/2020    CALCIUM 10.1 10/19/2020    MG 1.8 10/19/2020    PHOS 2.9 2017     LFTs:   Lab Results   Component Value Date    PROT 8.3 10/19/2020    ALBUMIN 4.3 " 10/19/2020    BILITOT 0.5 10/19/2020    AST 45 (H) 10/19/2020    ALKPHOS 47 (L) 10/19/2020    ALT 55 (H) 10/19/2020     Coags:   Lab Results   Component Value Date    INR 1.0 10/19/2020     FLP:   Lab Results   Component Value Date    CHOL 161 08/25/2020    HDL 75 08/25/2020    LDLCALC 75.2 08/25/2020    TRIG 54 08/25/2020    CHOLHDL 46.6 08/25/2020     DM:   Lab Results   Component Value Date    HGBA1C 6.5 (H) 11/20/2019    HGBA1C 7.0 (H) 02/19/2019    HGBA1C 6.7 (H) 05/28/2017    LDLCALC 75.2 08/25/2020    CREATININE 0.9 10/19/2020     Thyroid:   Lab Results   Component Value Date    TSH 4.180 (H) 08/25/2020    FREET4 0.96 08/25/2020     Anemia:   Lab Results   Component Value Date    IRON 42 (L) 08/24/2016    TIBC 462 (H) 08/24/2016    FERRITIN 16 (L) 07/01/2016    NOSCVSBE95 846 02/19/2019    FOLATE >24.0 08/24/2016     Cardiac:   Lab Results   Component Value Date    TROPONINI 0.008 10/19/2020    BNP 90 10/19/2020     Urinalysis:   Lab Results   Component Value Date    LABURIN ESCHERICHIA COLI  >100,000 cfu/ml   01/10/2019    COLORU LT. RED 01/10/2019    SPECGRAV 1.015 01/10/2019    NITRITE Negative 01/10/2019    KETONESU Negative 01/10/2019    UROBILINOGEN Negative 01/10/2019    WBCUA 50 (H) 01/10/2019       Trended Lab Data:  Recent Labs   Lab 10/19/20  0942   WBC 19.51*   HGB 15.0   HCT 44.0   *   MCV 92   RDW 14.4   *   K 4.2   CL 90*   CO2 30*   BUN 20   CREATININE 0.9   GLU 96   PROT 8.3   ALBUMIN 4.3   BILITOT 0.5   AST 45*   ALKPHOS 47*   ALT 55*       Trended Cardiac Data:  Recent Labs   Lab 10/19/20  0942   TROPONINI 0.008   BNP 90       Microbiology Data:  Covid-19 negative    Other Results:  EKG (my interpretation): 10/19/2020 Sinus tachycardia 143. Normal axis. No STEMI.     Radiology:  CXR AP Portable  No obvious evidence of an acute pulmonary process.     Assessment:   Adriana Strong is a 74 yo female with a past medical history of asthma, chronic diastolic HF, atrial fibrillation  on Eliquis, aortic stenosis s/p TAVR (11/2019), CAD, HTN, HLD, T2DM, stroke, GERD, anxiety, and arthritis who presented to [facility] on 10/19/2020 with a complaint of shortness of breath, dry cough, and achy chest discomfort for 6 days. Patient was seen by her PCP twice last week and provided prednisone without relief of symptoms. On arrival patient was hypertensive and tachycardic at 150 with a normal oxygen saturation. ED management included an hour long breathing treatment and IV diltiazem 35 mg over two doses. Inpatient admission for further evaluation of acute asthma exacerbation and atrial fibrillation with RVR.     Plan:     Asthma Exacerbation, persistent  Patient presented with progressive SOB, chest discomfort, and dry cough for 6 days unrelieved by prednisone or home breathing treatments.  -ED course: On admission, SpO2 99% on RA, T 99.2. BNP 90, Covid negative. Portable CXR without acute process.  -Ordered PA CXR. Ordered sputum gram stain and culture. Consider obtaining respiratory viral panel.  -Home meds include albuterol inhaler and nebulizer, budesonide-formoterol inhaler, zafirlukast.   -Provide albuterol-ipratropium nebulizer Q4H, IV methylprednisolone 60 Q6H, and montelukast 10 QHS.  -Consult pulmonology. Obtain PEF measurement. Consider obtaining ABG for worsening symptoms.  -Provide regular pulse ox monitoring and supplemental NC for SpO2 <92%.  -Will require f/u with PCP on discharge.    Atrial Fibrillation with RVR  On arrival, sinus tachycardia 150s. Afib with RVR captured on monitor, 110s-130s, intermittent with sinus tachycardia. Initial troponin 0.008.  -ED course: Given IV Diltiazem 35 mg in two doses. HR remained in 140s.  -Has required multiple cardioversions in the past. Latest in May 2020. Patient follows Dr. Hany Bee and Jr Carrington.  -Home meds include diltiazem 240 QD, apixaban 5 BID, and clopidogrel 75 QD. Will continue.   -Consult cardiology. Obtain TTE. Obtain repeat  EKG.  -Provide continuous cardiac monitoring.  -Will require f/u with cardiologist on d/c.    Leukocytosis  WBC count 19.51. Patient afebrile but with complaints of dysuria.  -Likely related to chronic corticosteroid use.  -Obtain UA/UC. Obtain procalcitonin.  -Continue to monitor.    Transaminitis  AST 45 ALT 55 on admission.   -Likely related to regular use of acetaminophen 1300 mg BID.  -Consider obtaining acetaminophen level.  -Will continue to monitor.    HTN  -218/102 on arrival to ED. Resolved to 142/87 within minutes.  -Home meds include Hctz 12.5 QD, isosorbide mononitrate 30 QD, lisinopril 20 mg, losartan 50 BID.  -Continue all with exception of losartan.  -Latest 123/95.    Aortic stenosis, s/p TAVR  Bioprosthetic TAVR in 11/2019. No aortic insufficiency on echo 5/15/2020.  -No acute concerns. Will continue to monitor.     Chronic Diastolic CHF  Echo (5/15/2020) with normal LV/RV systolic function with EF 60%. No aortic insufficiency.  -No signs of overt volume overload on exam.   -Consider initiation of IV furosemide. PO 40 furosemide at home.    CAD  [facility] (11/5/2019): LM free of disease, LAD with long tubular stenosis of 70% at diagonal bifurcation. Normal LCx and RCA.  -Lipid panel (8/25/2020): Cholesterol 161, LDL 75.2, HDL 75  -Home meds include atorvastatin 10 mg QD and fish oil-omega-3 fatty acids 1g QD. Will continue atorvastatin.    Type II Diabetes Mellitus  Latest A1c 6.5 (11/20/2019).  -Home meds include metformin 500 BID.  -Will initiate SSI. Obtain repeat A1c.    GERD  -Will continue pantoprazole 40 QD.     Anxiety  -Will continue home duloxetine 20 QD.  -No acute concerns.    IBS  -Will continue polyethylene glycol 17g QD and bisacodyl EC 5 QD.    Musculoskeletal Pain  Patient reported chronic back pain and arthritis.  -Home meds include acetaminophen 1300 BID and gabapentin 600 TID.  -No acute concerns at this time.    Code: Full  Diet: Diabetic  PPx: SCD  HCM: PCP Krzysztof  Mariluz  Disposition: D/c pending symptomatic improvement and appropriate rate control    Steff Mccracken  [facility] PA-S2    [facility] Hospitalist Pager numbers:   [facility] Hospitalist Medicine Team A (Rajinder/Isis): [phone]  [facility] Hospitalist Medicine Team B (Antoni/Carleen):  [phone]

## 2020-10-19 NOTE — NURSING
Arrived to room AAOx4, VSS, belongings and Walking cane at the bedside. Bed in the lowest position, call bell within reach, bed alarm on.

## 2020-10-19 NOTE — ED PROVIDER NOTES
Encounter Date: 10/19/2020       History     Chief Complaint   Patient presents with    Shortness of Breath     c/o asthma exacerbation x1 week, with increasing SOB, chest tightness, and cough.     Patient is a 75-year-old female with history of atrial fibrillation and asthma who presents with worsening shortness of breath over the past few days.  She has also had a dry cough as well as chest tightness.  She denies fever or chills.  She has been using her nebulizer machine at home with minimal results.        Review of patient's allergies indicates:   Allergen Reactions    Celebrex [celecoxib] Shortness Of Breath    Ciprofloxacin Swelling     lip    Dicyclomine Hives    Adhesive Dermatitis    Avelox [moxifloxacin] Swelling    Cilostazol Other (See Comments)     Elevates blood pressure    Eryc [erythromycin] Other (See Comments)     unknown    Iodine and iodide containing products Hives    Keflex [cephalexin] Hives    Meclomen      rashes    Meloxicam      Ear ringing    Metoclopramide Other (See Comments)     High blood pressure    Sulfa (sulfonamide antibiotics) Itching     Past Medical History:   Diagnosis Date    Acute on chronic diastolic (congestive) heart failure 11/20/2019    Anticoagulant long-term use     on Plavix since May 2015    Anxiety     Arthritis     Asthma     Atrial fibrillation     Cataracts, bilateral     Chest pain, atypical     Colon polyp     Coronary artery disease     Diabetes mellitus     Dry eyes     Esophageal erosions     GERD (gastroesophageal reflux disease)     Heart murmur     Hemorrhoid     High cholesterol     Hypertension     Irritable bowel syndrome     Shingles 2015    Stenosis of aortic and mitral valves     Stroke     TIA (transient ischemic attack)     Use of cane as ambulatory aid      Past Surgical History:   Procedure Laterality Date    ABDOMINAL SURGERY      stents to SMA    angiocele      2007, 2014    AORTIC VALVE REPLACEMENT  N/A 11/19/2019    Procedure: Replacement-valve-aortic;  Surgeon: Jack Capone MD;  Location: [facility] CATH LAB;  Service: Cardiology;  Laterality: N/A;    BREAST SURGERY      left--- a lump--- no cancer    CARDIAC VALVE SURGERY      COLONOSCOPY  2014    COLONOSCOPY N/A 3/14/2018    Procedure: COLONOSCOPY;  Surgeon: Efren Kemp MD;  [facility]  Service: Endoscopy;  Laterality: N/A;  ok to hold Plavix 5 days prior to procedure per Dr RESHMA Hernandez     ok per Dr Kemp to hold Eliquis 2 days prior to procedure (see telephone encounter dated-2/7/18)    HERNIA REPAIR      HYSTERECTOMY  partial    1982 partial hysterectomy    LEFT HEART CATHETERIZATION Right 11/5/2019    Procedure: Left heart cath;  Surgeon: Jack Capone MD;  Location: [facility] CATH LAB;  Service: Cardiology;  Laterality: Right;    left nasal polyp      left neck lymph node      nose polyp      right hip fatty mass tissue      stent to small intestine      SUPERIOR VENA CAVA ANGIOPLASTY / STENTING      TONSILLECTOMY      TOTAL ABDOMINAL HYSTERECTOMY      2014    TOTAL KNEE ARTHROPLASTY Bilateral     TREATMENT OF CARDIAC ARRHYTHMIA N/A 2/10/2020    Procedure: CARDIOVERSION;  Surgeon: Jayy Parrish MD;  Location: [facility] EP LAB;  Service: Cardiology;  Laterality: N/A;  AF, PEDRO, DCCV, MAC, DM, 3 Prep    TREATMENT OF CARDIAC ARRHYTHMIA N/A 5/15/2020    Procedure: CARDIOVERSION;  Surgeon: Hany Bee MD;  Location: [facility] EP LAB;  Service: Cardiology;  Laterality: N/A;  AF, PEDRO, DCCV, MAC, DM, 3 Prep    UPPER GASTROINTESTINAL ENDOSCOPY  2014     Family History   Problem Relation Age of Onset    Heart attack Mother     Stroke Father     Heart failure Brother     Colon cancer Neg Hx     Inflammatory bowel disease Neg Hx      Social History     Tobacco Use    Smoking status: Never Smoker    Smokeless tobacco: Never Used   Substance Use Topics    Alcohol use: No    Drug use: No     Review of Systems   Constitutional:  Negative for chills and fever.   Respiratory: Positive for cough and shortness of breath.    Cardiovascular: Positive for chest pain.   Gastrointestinal: Negative for abdominal pain, nausea and vomiting.   Neurological: Negative for syncope.   All other systems reviewed and are negative.      Physical Exam     Initial Vitals [10/19/20 0923]   BP Pulse Resp Temp SpO2   (!) 218/102 (!) 150 (!) 30 99.2 °F (37.3 °C) 99 %      MAP       --         Physical Exam    Nursing note and vitals reviewed.  Constitutional: She appears distressed.   HENT:   Head: Atraumatic.   Eyes: EOM are normal.   Neck: Neck supple.   Cardiovascular:   Tachycardic, irregular.   Pulmonary/Chest:   Diminished breath sounds bilaterally with expiratory wheezing in all lung fields.   Abdominal: Soft. There is no abdominal tenderness.   Musculoskeletal: Normal range of motion. No edema.   Neurological: She is alert and oriented to person, place, and time.   Skin: Skin is warm and dry.   Psychiatric: Her behavior is normal. Thought content normal.         ED Course   Critical Care    Date/Time: 10/19/2020 1:11 PM  Performed by: Davian Carpio MD  Authorized by: Davian Carpio MD   Direct patient critical care time: 60 minutes  Additional history critical care time: 10 minutes  Ordering / reviewing critical care time: 15 minutes  Documentation critical care time: 15 minutes  Consulting other physicians critical care time: 5 minutes  Total critical care time (exclusive of procedural time) : 105 minutes  Critical care was time spent personally by me on the following activities: examination of patient, ordering and performing treatments and interventions, ordering and review of radiographic studies, re-evaluation of patient's condition, review of old charts, pulse oximetry, ordering and review of laboratory studies, obtaining history from patient or surrogate, evaluation of patient's response to treatment and discussions with primary  provider.        Labs Reviewed   APTT - Abnormal; Notable for the following components:       Result Value    aPTT 36.2 (*)     All other components within normal limits   CBC W/ AUTO DIFFERENTIAL - Abnormal; Notable for the following components:    WBC 19.51 (*)     Mean Corpuscular Hemoglobin 31.3 (*)     Platelets 360 (*)     Gran # (ANC) 12.5 (*)     Immature Grans (Abs) 0.10 (*)     Lymph # 5.0 (*)     Mono # 1.8 (*)     All other components within normal limits   COMPREHENSIVE METABOLIC PANEL - Abnormal; Notable for the following components:    Sodium 134 (*)     Chloride 90 (*)     CO2 30 (*)     Alkaline Phosphatase 47 (*)     AST 45 (*)     ALT 55 (*)     All other components within normal limits   B-TYPE NATRIURETIC PEPTIDE   MAGNESIUM   TROPONIN I   PROTIME-INR   SARS-COV-2 RNA AMPLIFICATION, QUAL     EKG Readings: (Independently Interpreted)   Initial Reading: No STEMI. Rhythm: Sinus Tachycardia. Heart Rate: 143. ST Segments: Normal ST Segments.       Imaging Results          X-Ray Chest AP Portable (Final result)  Result time 10/19/20 10:52:18    Final result by Krzysztof Alcantara MD (10/19/20 10:52:18)                 Impression:      No obvious evidence of an acute pulmonary process.      Electronically signed by: Krzysztof Alcantara  Date:    10/19/2020  Time:    10:52             Narrative:    EXAMINATION:  XR CHEST AP PORTABLE    CLINICAL HISTORY:  Shortness of breath    TECHNIQUE:  Single frontal view of the chest was performed.    COMPARISON:  November 26, 2019    FINDINGS:  Single frontal view of the chest indicates that the lungs are well aerated.  No indication of a consolidative process or pleural effusion.  No pulmonary nodule.  The heart is normal in size and contour.                                 Medical Decision Making:   History:   Old Medical Records: I decided to obtain old medical records.  Clinical Tests:   Lab Tests: Ordered and Reviewed  Radiological Study: Ordered and  Reviewed  Medical Tests: Ordered and Reviewed  ED Management:  75-year-old female with history of asthma and AFib presents with worsening shortness of breath.  Patient has wheezing in all lung fields upon presentation, and she was given an hour long breathing treatment as well as intravenous Solu-Medrol.  She is still noted with wheezing after the treatment, requiring admission.  Heart rate was initially 150.  Patient was given 2 rounds of intravenous Cardizem with only a brief temporary affect.  Heart rate then has remained between 115 and 140.  I have discussed this with Dr. Valdovinos, feels this heart rate is not that much of a concern and that it will gradually decrease as patient's respiratory status improves.  Case has been discussed with Dr. Marinelli, who will admit.                             Clinical Impression:       ICD-10-CM ICD-9-CM   1. Atrial fibrillation with RVR  I48.91 427.31   2. Shortness of breath  R06.02 786.05   3. Severe persistent asthma with exacerbation  J45.51 493.92                          ED Disposition Condition    Observation                             Davian Carpio MD  10/19/20 [id number]

## 2020-10-20 LAB
BASOPHILS # BLD AUTO: 0.01 K/UL (ref 0–0.2)
BASOPHILS NFR BLD: 0.1 % (ref 0–1.9)
DIFFERENTIAL METHOD: ABNORMAL
EOSINOPHIL # BLD AUTO: 0 K/UL (ref 0–0.5)
EOSINOPHIL NFR BLD: 0 % (ref 0–8)
ERYTHROCYTE [DISTWIDTH] IN BLOOD BY AUTOMATED COUNT: 14.6 % (ref 11.5–14.5)
ESTIMATED AVG GLUCOSE: 123 MG/DL (ref 68–131)
HBA1C MFR BLD HPLC: 5.9 % (ref 4–5.6)
HCT VFR BLD AUTO: 39.5 % (ref 37–48.5)
HGB BLD-MCNC: 13.4 G/DL (ref 12–16)
IMM GRANULOCYTES # BLD AUTO: 0.11 K/UL (ref 0–0.04)
IMM GRANULOCYTES NFR BLD AUTO: 0.8 % (ref 0–0.5)
LYMPHOCYTES # BLD AUTO: 1.6 K/UL (ref 1–4.8)
LYMPHOCYTES NFR BLD: 11.7 % (ref 18–48)
MCH RBC QN AUTO: 31.2 PG (ref 27–31)
MCHC RBC AUTO-ENTMCNC: 33.9 G/DL (ref 32–36)
MCV RBC AUTO: 92 FL (ref 82–98)
MONOCYTES # BLD AUTO: 0.3 K/UL (ref 0.3–1)
MONOCYTES NFR BLD: 1.9 % (ref 4–15)
NEUTROPHILS # BLD AUTO: 12 K/UL (ref 1.8–7.7)
NEUTROPHILS NFR BLD: 85.5 % (ref 38–73)
NRBC BLD-RTO: 0 /100 WBC
PLATELET # BLD AUTO: 325 K/UL (ref 150–350)
PMV BLD AUTO: 9.8 FL (ref 9.2–12.9)
POCT GLUCOSE: 189 MG/DL (ref 70–110)
POCT GLUCOSE: 192 MG/DL (ref 70–110)
POCT GLUCOSE: 197 MG/DL (ref 70–110)
POCT GLUCOSE: 222 MG/DL (ref 70–110)
RBC # BLD AUTO: 4.3 M/UL (ref 4–5.4)
TROPONIN I SERPL DL<=0.01 NG/ML-MCNC: 0.01 NG/ML (ref 0–0.03)
WBC # BLD AUTO: 13.99 K/UL (ref 3.9–12.7)

## 2020-10-20 PROCEDURE — 25000003 PHARM REV CODE 250: Performed by: STUDENT IN AN ORGANIZED HEALTH CARE EDUCATION/TRAINING PROGRAM

## 2020-10-20 PROCEDURE — 25000242 PHARM REV CODE 250 ALT 637 W/ HCPCS: Performed by: STUDENT IN AN ORGANIZED HEALTH CARE EDUCATION/TRAINING PROGRAM

## 2020-10-20 PROCEDURE — 11000001 HC ACUTE MED/SURG PRIVATE ROOM

## 2020-10-20 PROCEDURE — 93010 ELECTROCARDIOGRAM REPORT: CPT | Mod: ,,, | Performed by: INTERNAL MEDICINE

## 2020-10-20 PROCEDURE — 36415 COLL VENOUS BLD VENIPUNCTURE: CPT

## 2020-10-20 PROCEDURE — 87070 CULTURE OTHR SPECIMN AEROBIC: CPT

## 2020-10-20 PROCEDURE — 84484 ASSAY OF TROPONIN QUANT: CPT

## 2020-10-20 PROCEDURE — 85025 COMPLETE CBC W/AUTO DIFF WBC: CPT

## 2020-10-20 PROCEDURE — 94761 N-INVAS EAR/PLS OXIMETRY MLT: CPT

## 2020-10-20 PROCEDURE — 87205 SMEAR GRAM STAIN: CPT

## 2020-10-20 PROCEDURE — 94640 AIRWAY INHALATION TREATMENT: CPT

## 2020-10-20 PROCEDURE — 94667 MNPJ CHEST WALL 1ST: CPT

## 2020-10-20 PROCEDURE — 93010 EKG 12-LEAD: ICD-10-PCS | Mod: ,,, | Performed by: INTERNAL MEDICINE

## 2020-10-20 PROCEDURE — 63600175 PHARM REV CODE 636 W HCPCS: Performed by: STUDENT IN AN ORGANIZED HEALTH CARE EDUCATION/TRAINING PROGRAM

## 2020-10-20 PROCEDURE — 99900035 HC TECH TIME PER 15 MIN (STAT)

## 2020-10-20 PROCEDURE — 27000221 HC OXYGEN, UP TO 24 HOURS

## 2020-10-20 PROCEDURE — 94668 MNPJ CHEST WALL SBSQ: CPT

## 2020-10-20 PROCEDURE — 93005 ELECTROCARDIOGRAM TRACING: CPT

## 2020-10-20 PROCEDURE — 83036 HEMOGLOBIN GLYCOSYLATED A1C: CPT

## 2020-10-20 RX ORDER — METHYLPREDNISOLONE SOD SUCC 125 MG
60 VIAL (EA) INJECTION EVERY 8 HOURS
Status: DISCONTINUED | OUTPATIENT
Start: 2020-10-20 | End: 2020-10-21 | Stop reason: SDUPTHER

## 2020-10-20 RX ORDER — NITROFURANTOIN 25; 75 MG/1; MG/1
100 CAPSULE ORAL 2 TIMES DAILY
Status: DISCONTINUED | OUTPATIENT
Start: 2020-10-20 | End: 2020-10-21 | Stop reason: HOSPADM

## 2020-10-20 RX ORDER — GUAIFENESIN 600 MG/1
600 TABLET, EXTENDED RELEASE ORAL 2 TIMES DAILY
Status: DISCONTINUED | OUTPATIENT
Start: 2020-10-20 | End: 2020-10-21 | Stop reason: HOSPADM

## 2020-10-20 RX ORDER — ACETAMINOPHEN 325 MG/1
650 TABLET ORAL EVERY 6 HOURS PRN
Status: DISCONTINUED | OUTPATIENT
Start: 2020-10-20 | End: 2020-10-21 | Stop reason: HOSPADM

## 2020-10-20 RX ADMIN — APIXABAN 5 MG: 5 TABLET, FILM COATED ORAL at 08:10

## 2020-10-20 RX ADMIN — NITROFURANTOIN (MONOHYDRATE/MACROCRYSTALS) 100 MG: 75; 25 CAPSULE ORAL at 08:10

## 2020-10-20 RX ADMIN — HYDROCHLOROTHIAZIDE 12.5 MG: 12.5 TABLET ORAL at 08:10

## 2020-10-20 RX ADMIN — GUAIFENESIN 600 MG: 600 TABLET, EXTENDED RELEASE ORAL at 08:10

## 2020-10-20 RX ADMIN — PANTOPRAZOLE SODIUM 40 MG: 40 TABLET, DELAYED RELEASE ORAL at 06:10

## 2020-10-20 RX ADMIN — METHYLPREDNISOLONE SODIUM SUCCINATE 60 MG: 125 INJECTION, POWDER, FOR SOLUTION INTRAMUSCULAR; INTRAVENOUS at 09:10

## 2020-10-20 RX ADMIN — MONTELUKAST 10 MG: 10 TABLET, FILM COATED ORAL at 08:10

## 2020-10-20 RX ADMIN — LISINOPRIL 20 MG: 20 TABLET ORAL at 08:10

## 2020-10-20 RX ADMIN — BISACODYL 5 MG: 5 TABLET, COATED ORAL at 08:10

## 2020-10-20 RX ADMIN — IPRATROPIUM BROMIDE AND ALBUTEROL SULFATE 3 ML: .5; 3 SOLUTION RESPIRATORY (INHALATION) at 09:10

## 2020-10-20 RX ADMIN — INSULIN ASPART 1 UNITS: 100 INJECTION, SOLUTION INTRAVENOUS; SUBCUTANEOUS at 09:10

## 2020-10-20 RX ADMIN — ISOSORBIDE MONONITRATE 30 MG: 30 TABLET, EXTENDED RELEASE ORAL at 08:10

## 2020-10-20 RX ADMIN — DILTIAZEM HYDROCHLORIDE 240 MG: 120 CAPSULE, COATED, EXTENDED RELEASE ORAL at 08:10

## 2020-10-20 RX ADMIN — IPRATROPIUM BROMIDE AND ALBUTEROL SULFATE 3 ML: .5; 3 SOLUTION RESPIRATORY (INHALATION) at 04:10

## 2020-10-20 RX ADMIN — INSULIN ASPART 2 UNITS: 100 INJECTION, SOLUTION INTRAVENOUS; SUBCUTANEOUS at 11:10

## 2020-10-20 RX ADMIN — GABAPENTIN 600 MG: 300 CAPSULE ORAL at 08:10

## 2020-10-20 RX ADMIN — POLYETHYLENE GLYCOL (3350) 17 G: 17 POWDER, FOR SOLUTION ORAL at 08:10

## 2020-10-20 RX ADMIN — METHYLPREDNISOLONE SODIUM SUCCINATE 60 MG: 125 INJECTION, POWDER, FOR SOLUTION INTRAMUSCULAR; INTRAVENOUS at 11:10

## 2020-10-20 RX ADMIN — ACETAMINOPHEN 650 MG: 325 TABLET ORAL at 08:10

## 2020-10-20 RX ADMIN — INSULIN ASPART 4 UNITS: 100 INJECTION, SOLUTION INTRAVENOUS; SUBCUTANEOUS at 05:10

## 2020-10-20 RX ADMIN — IPRATROPIUM BROMIDE AND ALBUTEROL SULFATE 3 ML: .5; 3 SOLUTION RESPIRATORY (INHALATION) at 11:10

## 2020-10-20 RX ADMIN — GABAPENTIN 600 MG: 300 CAPSULE ORAL at 03:10

## 2020-10-20 RX ADMIN — METHYLPREDNISOLONE SODIUM SUCCINATE 60 MG: 125 INJECTION, POWDER, FOR SOLUTION INTRAMUSCULAR; INTRAVENOUS at 06:10

## 2020-10-20 RX ADMIN — IPRATROPIUM BROMIDE AND ALBUTEROL SULFATE 3 ML: .5; 3 SOLUTION RESPIRATORY (INHALATION) at 12:10

## 2020-10-20 RX ADMIN — DULOXETINE HYDROCHLORIDE 20 MG: 20 CAPSULE, DELAYED RELEASE ORAL at 08:10

## 2020-10-20 RX ADMIN — IPRATROPIUM BROMIDE AND ALBUTEROL SULFATE 3 ML: .5; 3 SOLUTION RESPIRATORY (INHALATION) at 08:10

## 2020-10-20 RX ADMIN — ATORVASTATIN CALCIUM 10 MG: 10 TABLET, FILM COATED ORAL at 08:10

## 2020-10-20 RX ADMIN — CLOPIDOGREL 75 MG: 75 TABLET, FILM COATED ORAL at 08:10

## 2020-10-20 RX ADMIN — INSULIN ASPART 2 UNITS: 100 INJECTION, SOLUTION INTRAVENOUS; SUBCUTANEOUS at 08:10

## 2020-10-20 RX ADMIN — VITAM B12 100 MCG: 100 TAB at 08:10

## 2020-10-20 RX ADMIN — OXYBUTYNIN CHLORIDE 5 MG: 5 TABLET, EXTENDED RELEASE ORAL at 08:10

## 2020-10-20 NOTE — PROGRESS NOTES
"[facility] Hospital Medicine Progress Note    Primary Team: [facility] Hospitalist Team A  Attending Physician: Zunilda Marinelli MD  Resident: Josh  Intern: Jaun    Subjective:      Patient seen at bedside resting in no acute distress. Episode of aching 10/10 chest pain while receiving breathing treatments last night. Improved to 6/10 after about a min. Trop 0.014, CXR unremarkable. EKG atrial flutter with variable A-V block. This AM, reports that she still has some mild substernal chest tightness, but otherwise is feeling well. Reports that her breathing is improved.      Objective:     Last 24 Hour Vital Signs:  BP  Min: 127/66  Max: 146/97  Temp  Av.2 °F (36.2 °C)  Min: 96.5 °F (35.8 °C)  Max: 98.2 °F (36.8 °C)  Pulse  Av.1  Min: 83  Max: 119  Resp  Av.5  Min: 16  Max: 24  SpO2  Av.9 %  Min: 91 %  Max: 96 %  Height  Av' 4" (162.6 cm)  Min: 5' 4" (162.6 cm)  Max: 5' 4" (162.6 cm)  Weight  Av.2 kg (227 lb 8.2 oz)  Min: 103.2 kg (227 lb 8.2 oz)  Max: 103.2 kg (227 lb 8.2 oz)  I/O last 3 completed shifts:  In: -   Out: 1350 [Urine:1350]    Physical Examination:  Gen: NAD, well appearing, alert, interactive, A&Ox3  HEENT: NC/AT, PERRL, EOMI, good conjugate gaze, MMM  NECK: Supple, No LAD, Normal ROM  CV: Tachycardic  Resp: CTAB, normal WOB, diffuse expiratory wheezing in all lung fields  Abd: soft, ND/NT, normal bowel sounds, no masses  Ext: normal tone and ROM, strength and sensation intact, cap refill <2s, 2+ dp equal bl  Neuro: A&Ox3, CN II to XII intact, reflex symmetric, sensation normal  Skin: intact, no rashes, no lesions, no erythema       Laboratory:  Laboratory Data Reviewed: yes  Pertinent Findings:  Recent Labs   Lab 10/19/20  0942 10/20/20  0457   WBC 19.51* 13.99*   HGB 15.0 13.4   HCT 44.0 39.5   * 325   MCV 92 92   RDW 14.4 14.6*   *  --    K 4.2  --    CL 90*  --    CO2 30*  --    BUN 20  --    CREATININE 0.9  --    GLU 96  --    PROT 8.3  --    ALBUMIN 4.3  --  "   BILITOT 0.5  --    AST 45*  --    ALKPHOS 47*  --    ALT 55*  --        Microbiology Data Reviewed: yes  Pertinent Findings:  None    Other Results:  EKG (my interpretation): sinus tachycardia. ST less depressed in Inferior leads. ST now depressed in Lateral leads. Nonspecific T wave abnormality no longer evident in Inferior leads. T wave inversion now evident in Lateral leads.    Radiology Data Reviewed: yes  Pertinent Findings:  X-Ray Chest PA And Lateral   Final Result      1. No convincing acute cardiopulmonary process, improved aeration.  No left consolidation as clinically questioned.         Electronically signed by: Tesfaye Murray MD   Date:    10/19/2020   Time:    18:12      X-Ray Chest AP Portable   Final Result      No obvious evidence of an acute pulmonary process.         Electronically signed by: Krzysztof Alcantara   Date:    10/19/2020   Time:    10:52      X-Ray Chest PA And Lateral    (Results Pending)       Current Medications:     Infusions:       Scheduled:   albuterol-ipratropium  3 mL Nebulization Q4H    apixaban  5 mg Oral BID    atorvastatin  10 mg Oral Daily    bisacodyL  5 mg Oral Daily    clopidogreL  75 mg Oral Daily    cyanocobalamin  100 mcg Oral Daily    diltiaZEM  240 mg Oral Daily    DULoxetine  20 mg Oral Daily    gabapentin  600 mg Oral TID    guaiFENesin  600 mg Oral BID    isosorbide mononitrate  30 mg Oral Daily    lisinopriL  20 mg Oral Daily    methylPREDNISolone sodium succinate  60 mg Intravenous Q8H    montelukast  10 mg Oral QHS    nitrofurantoin (macrocrystal-monohydrate)  100 mg Oral BID    oxybutynin  5 mg Oral Every other day    pantoprazole  40 mg Oral QAM    polyethylene glycol  17 g Oral Daily        PRN:  acetaminophen, dextrose 50%, dextrose 50%, glucagon (human recombinant), glucose, glucose, insulin aspart U-100, sodium chloride 0.9%    Assessment:     Adriana Strong is a 75 y.o. white female Who has a PMH of aortic stenosis s/p TAVR on  04/19, CVA/TIA, AFib, diabetes myelitis, HTN, asthma, cataracts, GERD, HLD, irritable bowel syndrome who presents to the ED on 10/19/20 with chief complaint of shortness of breath.  Given 1 hr treatment of albuterol nebulizer solution in addition to methylprednisolone 125 mg in the ED with little improvement in her shortness of breath.  Additionally, heart rate was elevated to 150 on presentation and was given 35 mg of diltiazem with brief improvement in her heart rate.  Per cardiology, patient's tachycardia should improve with improvement in her respiratory status.  She was admitted for continued breathing treatments and steroids. Patient showed marked improvement in her shortness of breath after treatment.      Plan:     Acute on chronic asthma exacerbation  History of asthma with approximately 2 exacerbations per year presenting with cough, shortness of breath, chest tightness for approximately 1 week that has not improved on oral steroids or with home breathing treatments  - Received nebulized albuterol for 1 hr in addition to 125 mg of methylprednisolone in the ED with little improvement in shortness of breath  - Chest x-ray with no acute cardiopulmonary process although questionable hazy opacity and left lower lobe.  - Repeat chest x-ray PA and lateral with no obvious evidence of an acute pulmonary process.  - DuoNebs q.4 hours  - Methylprednisolone 60 mg q8h  - Patient was previously on montelukast 10 mg but has since switched to zafirlukast.  Will continue patient on montelukast as zafirlukast is not carried here  - guaifenesin 600 mg bid  - Chest physiotherapy ordered     Leukocytosis in the setting of recent steroid use  - Patient was initially started on prednisone 10 mg this past Tuesday which was increased to 20 mg on Friday; White count on admit 19.5  - No fever, chills, sputum production, or other signs and symptoms of infection  - Likely 2/2 steroid use  - Trending down  - Will continue to  monitor     Chronic AFib with RVR  On presentation to the ED patient had a heart rate of 150; patient currently denies any palpitations, reports some lightheadedness on admit, but has since been asymptomatic   - EKG with sinus tachycardia but appears to be in AFib on tele in the ED  - Repeat EKG: Atrial fibrillation with rapid ventricular response  - Received 35 mg of diltiazem IV in the ED with temporary improvement in heart rate  - Patient discussed with Cardiology, HR will likely improve with improvement in her respiratory status  - Continue home Eliquis 5 mg and diltiazem 240 mg  - Follows with Dr. Bee and Dr. Carrington   - Patient has appointment scheduled with Dr. Carrington on 12/08/20  - HR improved  - Episode of aching 10/10 chest pain while receiving breathing treatments night of 10/20. Improved to 6/10 after about a min. Trop 0.014, CXR unremarkable.   - EKG atrial flutter with variable A-V block.  - Repeat EKG ordered     Urinary Tract Infection  Patient complaining of dysuria and increased frequency that has started this week.   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 Bee and Dr. Carrington  - Patient has appointment scheduled with Dr. Carrington on 12/08/20     Type 2 diabetes myelitis  - On metformin 500 mg BID  will hold while inpatient  - A1c on 10/20/20 5.9  - Sliding scale insulin  - Accu-Cheks     HTN  - Patient currently on HCTZ 12.5 mg, Imdur 30 mg, lisinopril 20 mg at home  - Imdur 30 mg and lisinopril 20 mg continued  - HCTZ 12.5 mg held in the setting of  contraction alkalosis; will discontinue at discharge     GERD  - Continue Protonix 40 mg daily     Chronic constipation  - Continue Dulcolax 5 mg and polyethylene glycol 17 mg     HLD  - Continue atorvastatin 10 mg     Anxiety  - Continue duloxetine 20 mg     Chronic back pain  - Continue gabapentin 6 on did mg TID     Possible RADHA  Patient reports some symptoms of RADHA in the setting of multiple risk factors and pulmonary HTN  - Will need f/u with Pulmonary on discharge and outpatient PSG     Diet: Diabetic  DVT PPX: Eliquis     Dispo: Discharge today and will need follow up with her PCP, Pulmonary, and Allergy  Code: Full    Ishaan Zamorano MD  [facility] Internal Medicine HO-I    [facility] Medicine Hospitalist Pager numbers:   [facility] Hospitalist Medicine Team A (Rajinder/Isis): [phone]  [facility] Hospitalist Medicine Team B (Antoni/Carleen):  [phone]

## 2020-10-20 NOTE — PLAN OF CARE
Plan of care reviewed with patient. Pt verbalized understanding. Pt is AAO x 4 and Afib on Tele, no true red alarms. No distress noted and pt denies any pain and SOB. Pt has a congested cough and has a specimen cup at bedside for a sputum sample. Flu swab was done on pt and is negative. Pt is resting watching television. Bed is in lowest position, bed alarm on, side rails up x 2, call light, cane and bedside table within reach. Will continue to assess pt until end of shift.

## 2020-10-20 NOTE — MEDICAL/APP STUDENT
"[facility] Hospital Medicine Progress Note    Primary Team: [facility] Hospitalist Team A  Attending Physician: Zuinlda Marinelli MD  Resident: Josh  Intern: Jaun    Subjective:      Found patient resting comfortably in bed. Patient reported some improvement in her SOB that is predominant on ambulation and while coughing. She reported not being able to take an effortful breath during the "coughing fits". Additionally she reported intermittent chest pressure and tightness (5/10) and palpitations. Patient endorsed pelvic pressure, dysuria, and urinary urgency for a few days preceding admission.     Objective:     Last 24 Hour Vital Signs:  BP  Min: 123/95  Max: 218/102  Temp  Av.6 °F (36.4 °C)  Min: 96.5 °F (35.8 °C)  Max: 99.2 °F (37.3 °C)  Pulse  Av.8  Min: 83  Max: 151  Resp  Av.7  Min: 16  Max: 30  SpO2  Av.5 %  Min: 91 %  Max: 99 %  Height  Av' 4" (162.6 cm)  Min: 5' 4" (162.6 cm)  Max: 5' 4" (162.6 cm)  Weight  Av.2 kg (236 lb 4.1 oz)  Min: 103.2 kg (227 lb 8.2 oz)  Max: 111.1 kg (245 lb)  I/O last 3 completed shifts:  In: -   Out: 1350 [Urine:1350]    Physical Examination:  BP (!) 145/70 (BP Location: Left arm, Patient Position: Lying)   Pulse 101 Comment: Simultaneous filing. User may not have seen previous data.  Temp 96.6 °F (35.9 °C) (Oral)   Resp 19   Ht 5' 4" (1.626 m)   Wt 103.2 kg (227 lb 8.2 oz)   LMP 1973 (LMP Unknown)   SpO2 (!) 91%   Breastfeeding No   BMI 39.05 kg/m²       General appearance: Awake, alert, obese female. Appears stated age. Cooperative with appropriate eye contact. No acute distress.  Head: Normocephalic, atraumatic. No obvious deformity.  Neck: No JVD. Supple, symmetrical midline trachea.  Lungs: No visible retractions. Diminished breaths sounds. Wheezing throughout. No crackles.   Heart: Tachycardic 122, irregular rhythm. S1 and S2 present. No click, rub, or gallop.   Abdomen: Soft, non-distended. Non-tender. Bowel sounds throughout. "   Extremities: Extremities normal, atraumatic. No cyanosis, trace edema to BLEs.  Pulses: 2+ and symmetric radial pulses. 2+ and symmetric distal pulses.  Skin: Skin pale pink. No rashes or lesions. Diffuse dilated vessels to BLEs.  Neurologic: AAOx4. Grossly intact. Able to move all extremities. Diminished strength on left side.    Laboratory:  Laboratory Data Reviewed: yes  Pertinent Findings:  WBC 13.99  Procalcitonin 0.09    Microbiology Data Reviewed: yes  Pertinent Findings:  Covid-19: Negative  Influenza A/B: Negative  UA: 1+ leukocytes, moderate bacteria, 1+ protein and glucose, trace occult blood  Urine cx: Pending  Respiratory cx: Pending    Other Results:  EKG (my interpretation): 10/19/2020 Sinus tachycardia 143. Normal axis. No STEMI.    Radiology Data Reviewed: yes  Pertinent Findings:    CXR PA and Lateral  1. No convincing acute cardiopulmonary process, improved aeration.  No left consolidation as clinically questioned.    Current Medications:     Infusions:       Scheduled:   albuterol-ipratropium  3 mL Nebulization Q4H    apixaban  5 mg Oral BID    atorvastatin  10 mg Oral Daily    bisacodyL  5 mg Oral Daily    clopidogreL  75 mg Oral Daily    cyanocobalamin  100 mcg Oral Daily    diltiaZEM  240 mg Oral Daily    DULoxetine  20 mg Oral Daily    gabapentin  600 mg Oral TID    hydroCHLOROthiazide  12.5 mg Oral Daily    isosorbide mononitrate  30 mg Oral Daily    lisinopriL  20 mg Oral Daily    methylPREDNISolone sodium succinate  60 mg Intravenous Q6H    montelukast  10 mg Oral QHS    nitrofurantoin (macrocrystal-monohydrate)  100 mg Oral BID    oxybutynin  5 mg Oral Every other day    pantoprazole  40 mg Oral QAM    polyethylene glycol  17 g Oral Daily        PRN:  acetaminophen, dextrose 50%, dextrose 50%, glucagon (human recombinant), glucose, glucose, insulin aspart U-100, sodium chloride 0.9%    Antibiotics and Day Number of Therapy:  PO Nitrofurantoin x 1 day    Lines and Day  Number of Therapy:  PIV R Forearm x 1 day    Assessment:     Adriana Strong is a 74 yo female with a past medical history of asthma, chronic diastolic HF, atrial fibrillation on Eliquis, aortic stenosis s/p TAVR (11/2019), CAD, HTN, HLD, T2DM, stroke, GERD, anxiety, and arthritis who presented to [facility] [place] [facility] on 10/19/2020 with a complaint of shortness of breath, dry cough, and achy chest discomfort for 6 days. Patient was seen by her PCP twice last week and provided prednisone without relief of symptoms. On arrival patient was hypertensive and tachycardic at 150 with a normal oxygen saturation. ED management included an hour long breathing treatment and IV diltiazem 35 mg over two doses. Inpatient admission for further evaluation of acute asthma exacerbation and atrial fibrillation with RVR.     Plan:      Asthma Exacerbation, persistent  Patient presented with progressive SOB, chest discomfort, and dry cough for 6 days unrelieved by prednisone or home breathing treatments.  -ED course: On admission, SpO2 99% on RA, T 99.2. BNP 90, Covid negative. Portable CXR without acute process.  -CXR PA and Lateral with no acute cardiopulmonary process.  -Ordered sputum gram stain and culture. Pending patient collection of sample.  -Influenza A/B negative. Consider obtaining respiratory viral panel.  -Home meds include albuterol inhaler and nebulizer, budesonide-formoterol inhaler, zafirlukast.   -Provide albuterol-ipratropium nebulizer Q4H, IV methylprednisolone 60 Q6H, and montelukast 10 QHS. Decrease CS dose.  -Consult pulmonology. Obtain PEF measurement. Consider obtaining ABG for worsening symptoms.  -Obtain PEF measure. Educate for at home use.  -Provide regular pulse ox monitoring and supplemental NC for SpO2 <92%.  -Will require f/u with PCP on discharge.     Atrial Fibrillation with RVR  On arrival, sinus tachycardia 150s. Afib with RVR captured on monitor, 110s-130s, intermittent with sinus tachycardia. Initial troponin  0.008.  -ED course: Given IV Diltiazem 35 mg in two doses. HR remained in 140s.  -Has required multiple cardioversions in the past. Latest in May 2020. Patient follows Dr. Hany Bee and Jr Carrington.  -Home meds include diltiazem 240 QD, apixaban 5 BID, and clopidogrel 75 QD. Will continue.   -Consult cardiology. Obtain TTE. Obtain repeat EKG.  -Improvement in rate control over last 12 hrs to 90-110s. Will provide continuous cardiac monitoring.  -Will require f/u with cardiologist on d/c.     Urinary Tract Infection, symptomatic  Patient presented with complaints of dysuria. Patient afebrile and w/o renal involvement.  -Leukocytosis on admission to 19.51. Downtrending. Latest 13.99.  -UA:1+ leukocytes, moderate bacteria, 1+ protein and glucose, trace blood.  -UC pending. Procalcitonin 0.09.  -Initiated Nitrofurantoin 100 mg BID x 5 days.     Transaminitis  AST 45 ALT 55 on admission.   -Likely related to regular use of acetaminophen 1300 mg BID.  -Consider obtaining acetaminophen level.  -Will continue to monitor.     HTN  -218/102 on arrival to ED. Resolved to 142/87 within minutes.  -Home meds include Hctz 12.5 QD, isosorbide mononitrate 30 QD, lisinopril 20 mg, losartan 50 BID.  -Losartan held. D/c Hctz.  -Latest 145/70.     Aortic stenosis, s/p TAVR  Bioprosthetic TAVR in 11/2019. No aortic insufficiency on echo 5/15/2020.  -No acute concerns. Will continue to monitor.      Chronic Diastolic CHF  Echo (5/15/2020) with normal LV/RV systolic function with EF 60%. No aortic insufficiency.  -No signs of overt volume overload on exam.   -Consider initiation of IV furosemide. PO 40 furosemide at home.     CAD  LHC (11/5/2019): LM free of disease, LAD with long tubular stenosis of 70% at diagonal bifurcation. Normal LCx and RCA.  -Lipid panel (8/25/2020): Cholesterol 161, LDL 75.2, HDL 75  -Home meds include atorvastatin 10 mg QD and fish oil-omega-3 fatty acids 1g QD. Will continue atorvastatin.     Type II  Diabetes Mellitus  Latest A1c 5.9 (10/20/2020).  -Home meds include metformin 500 BID.  -SSI initiated.     GERD  -Will continue pantoprazole 40 QD.      Anxiety  -Will continue home duloxetine 20 QD.  -No acute concerns.     IBS  -Will continue polyethylene glycol 17g QD and bisacodyl EC 5 QD.     Musculoskeletal Pain  Patient reported chronic back pain and arthritis.  -Home meds include acetaminophen 1300 BID and gabapentin 600 TID.  -No acute concerns at this time.     Code: Full  Diet: Diabetic  PPx: SCD  HCM: PCP Krzysztof Mccracken  [facility] PA-S2    [facility] Medicine Hospitalist Pager numbers:   [facility] Hospitalist Medicine Team A (Rajinder/Isis): [phone]  [facility] Hospitalist Medicine Team B (Antoni/Carleen):  [phone]

## 2020-10-20 NOTE — PLAN OF CARE
Pt oriented. DCA done at bedside with patient. Demographics/Ins/NextofKin/PCP verified with patient/family and updated as needed. Denies any DME needs at present from pt/family. Patient/family plans to return home with family. Educated on bedside RX. Patient consents for TN to discuss discharge plan with next of kin. Preferences appointment times and location obtained. Patient reports they will have transportation home upon discharge.    This  put name on white board and explained blue discharge folder to patient. Discharge planning brochure and/or business card given to patient.  Patient verbalized understanding.  Pt opts in for BS RX and declines priority care clinic.    Future Appointments   Date Time Provider Department Center   11/19/2020  9:45 AM LAB, SAME DAY [facility] LAB VNP [facility]   11/19/2020 10:15 AM ECHO, [facility] NOM ECHOSTR [facility]   11/19/2020 11:30 AM [facility] CARDVAL [facility]   12/8/2020 10:00 AM Jr Carrington MD [facility] CARDIO [facility]        10/20/20 3501   Discharge Assessment   Assessment Type Discharge Planning Assessment   Confirmed/corrected address and phone number on facesheet? Yes   Assessment information obtained from? Patient   Prior to hospitilization cognitive status: Alert/Oriented;No Deficits   Prior to hospitalization functional status: Assistive Equipment;Independent   Current cognitive status: Alert/Oriented;No Deficits   Current Functional Status: Assistive Equipment;Independent  (Min assist with showering. Assisted by .)   Lives With spouse   Able to Return to Prior Arrangements yes   Is patient able to care for self after discharge? Yes   Who are your caregiver(s) and their phone number(s)? Spouse Anthony Tae [phone]   Patient's perception of discharge disposition home or selfcare   Readmission Within the Last 30 Days no previous admission in last 30 days   Patient currently being followed by outpatient case management? No    Patient currently receives any other outside agency services? No   Equipment Currently Used at Home nebulizer;cane, straight;glucometer;walker, rolling;wheelchair   Do you have any problems affording any of your prescribed medications? No   Is the patient taking medications as prescribed? yes   Does the patient have transportation home? Yes   Transportation Anticipated family or friend will provide   Discharge Plan A Home with family   Discharge Plan B Home with family;Home Health   DME Needed Upon Discharge  none   Patient/Family in Agreement with Plan yes     Clara May RN  RN Transition Navigator  [phone]

## 2020-10-20 NOTE — PLAN OF CARE
VIRTUAL NURSE  PRIORITY ROUND: Cued into patient's room.  Permission received per patient to turn camera to view patient.  Introduced as VN for shift that will be working with floor nurse and nursing assistant. Patient's MEWS score of 5. She recently receieved breathing treatment. Her daughter is at bedside. Patient denies any SOB. MAR reviewed. Patient has nebulizer scheduled every 4 hours. She denies any pain at this time. Opportunity given for questions and questions answered.  Will continue to monitor and intervene as needed.      VN spoke with bedside nurse Bobby. She is already aware. Will be available as needed. Updated current HR. Mews score now a 4.

## 2020-10-20 NOTE — PROGRESS NOTES
"[facility] Hospital Medicine Progress Note    Primary Team: [facility] Hospitalist Team A  Attending Physician: Zunilda Marinelli MD  Resident: Josh  Intern: Jaun    Subjective:      Patient seen at bedside resting in no acute distress. Patient reports that she feels her breathing has gotten better since admit. Endorses a headaches but otherwise feels ok. States that she feels a little groggy 2/2 poor sleep in the hospital setting.     Objective:     Last 24 Hour Vital Signs:  BP  Min: 123/95  Max: 218/102  Temp  Av.8 °F (36.6 °C)  Min: 96.5 °F (35.8 °C)  Max: 99.2 °F (37.3 °C)  Pulse  Av.3  Min: 83  Max: 151  Resp  Av.8  Min: 16  Max: 30  SpO2  Av.8 %  Min: 93 %  Max: 99 %  Height  Av' 4" (162.6 cm)  Min: 5' 4" (162.6 cm)  Max: 5' 4" (162.6 cm)  Weight  Av.2 kg (236 lb 4.1 oz)  Min: 103.2 kg (227 lb 8.2 oz)  Max: 111.1 kg (245 lb)  No intake/output data recorded.    Physical Examination:  Gen: NAD, well appearing, alert, interactive, A&Ox3  HEENT: NC/AT, PERRL, EOMI, good conjugate gaze, MMM  NECK: Supple, No LAD, Normal ROM  CV: Tachycardic  Resp: CTAB, normal WOB, diffuse expiratory wheezing in all lung fields  Abd: soft, ND/NT, normal bowel sounds, no masses  Ext: normal tone and ROM, strength and sensation intact, cap refill <2s, 2+ dp equal bl  Neuro: A&Ox3, CN II to XII intact, reflex symmetric, sensation normal  Skin: intact, no rashes, no lesions, no erythema       Laboratory:  Laboratory Data Reviewed: yes  Pertinent Findings:  Recent Labs   Lab 10/19/20  0942 10/20/20  0457   WBC 19.51* 13.99*   HGB 15.0 13.4   HCT 44.0 39.5   * 325   MCV 92 92   RDW 14.4 14.6*   *  --    K 4.2  --    CL 90*  --    CO2 30*  --    BUN 20  --    CREATININE 0.9  --    GLU 96  --    PROT 8.3  --    ALBUMIN 4.3  --    BILITOT 0.5  --    AST 45*  --    ALKPHOS 47*  --    ALT 55*  --        Microbiology Data Reviewed: yes  Pertinent Findings:  None    Other Results:  EKG (my " interpretation): sinus tachycardia. ST less depressed in Inferior leads. ST now depressed in Lateral leads. Nonspecific T wave abnormality no longer evident in Inferior leads. T wave inversion now evident in Lateral leads.    Radiology Data Reviewed: yes  Pertinent Findings:  X-Ray Chest PA And Lateral   Final Result      1. No convincing acute cardiopulmonary process, improved aeration.  No left consolidation as clinically questioned.         Electronically signed by: Tesfaye Murray MD   Date:    10/19/2020   Time:    18:12      X-Ray Chest AP Portable   Final Result      No obvious evidence of an acute pulmonary process.         Electronically signed by: Krzysztof Alcantara   Date:    10/19/2020   Time:    10:52      X-Ray Chest PA And Lateral    (Results Pending)       Current Medications:     Infusions:       Scheduled:   albuterol-ipratropium  3 mL Nebulization Q4H    apixaban  5 mg Oral BID    atorvastatin  10 mg Oral Daily    bisacodyL  5 mg Oral Daily    clopidogreL  75 mg Oral Daily    cyanocobalamin  100 mcg Oral Daily    diltiaZEM  240 mg Oral Daily    DULoxetine  20 mg Oral Daily    gabapentin  600 mg Oral TID    hydroCHLOROthiazide  12.5 mg Oral Daily    isosorbide mononitrate  30 mg Oral Daily    lisinopriL  20 mg Oral Daily    methylPREDNISolone sodium succinate  60 mg Intravenous Q6H    montelukast  10 mg Oral QHS    oxybutynin  5 mg Oral Every other day    pantoprazole  40 mg Oral QAM    polyethylene glycol  17 g Oral Daily        PRN:  dextrose 50%, dextrose 50%, glucagon (human recombinant), glucose, glucose, insulin aspart U-100, sodium chloride 0.9%    Assessment:     Adriana Strong is a 75 y.o. white female Who has a PMH of aortic stenosis s/p TAVR on 04/19, CVA/TIA, AFib, diabetes myelitis, HTN, asthma, cataracts, GERD, HLD, irritable bowel syndrome who presents to the ED on 10/19/20 with chief complaint of shortness of breath.  Given 1 hr treatment of albuterol nebulizer  solution in addition to methylprednisolone 125 mg in the ED with little improvement in her shortness of breath.  Additionally, heart rate was elevated to 150 on presentation and was given 35 mg of diltiazem with brief improvement in her heart rate.  Per cardiology, patient's tachycardia should improve with improvement in her respiratory status.  Patient will be admitted for continued breathing treatments and steroids.     Plan:     Acute on chronic asthma exacerbation  History of asthma with approximately 2 exacerbations per year presenting with cough, shortness of breath, chest tightness for approximately 1 week that has not improved on oral steroids or with home breathing treatments  - Received nebulized albuterol for 1 hr in addition to 125 mg of methylprednisolone in the ED with little improvement in shortness of breath  - Chest x-ray with no acute cardiopulmonary process although questionable hazy opacity and left lower lobe.  - Repeat chest x-ray PA and lateral with no obvious evidence of an acute pulmonary process.  - DuoNebs q.4 hours  - Methylprednisolone 60 mg q6h  - Patient was previously on montelukast 10 mg but has since switched to zafirlukast.  Will continue patient on montelukast as zafirlukast is not carried here     Leukocytosis in the setting of recent steroid use  - White count on admit 19.5  - Patient was initially started on prednisone 10 mg this past Tuesday which was increased to 20 mg on Friday  - No fever, chills, sputum production, or other signs and symptoms of infection  - Likely 2/2 steroid use  - Will continue to monitor     Chronic AFib with RVR  - On presentation to the ED patient had a heart rate of 150; patient currently denies any palpitations, reports some lightheadedness on admit  - EKG with sinus tachycardia but appears to be in AFib on tele in the ED  - Will repeat EKG  - Received 35 mg of diltiazem IV in the ED with temporary improvement in heart rate  - Patient discussed  with Cardiology, HR will likely improve with improvement in her respiratory status  - Continue home Eliquis 5 mg and diltiazem 240 mg  - Follows with Dr. Bee and Dr. Carrington   - Patient has appointment scheduled with Dr. Carrington on 12/08/20     Urinary Tract Infection  Patient complaining of dysuria and increased frequency that has started this week.   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 Bee and Dr. Carrington  - Patient has appointment scheduled with Dr. Carrington on 12/08/20     Type 2 diabetes myelitis  - On metformin 500 mg BID  will hold while inpatient  - Sliding scale insulin  - Accu-Cheks     HTN  - Patient currently on HCTZ 12.5 mg, Imdur 30 mg, lisinopril 20 mg will continue inpatient  - Patient also on losartan 50 mg BID, unclear why patient is on both an Ace and an ARB, will hold for now     GERD  - Continue Protonix 40 mg daily     Chronic constipation  - Continue Dulcolax 5 mg and polyethylene glycol 17 mg     HLD  - Continue atorvastatin 10 mg     Anxiety  - Continue duloxetine 20 mg     Chronic back pain  - Continue gabapentin 6 on did mg TID      Diet: Diabetic  DVT PPX: Eliquis     Dispo: Pending symptotic improvement and resolution of symptoms; likely d/c tomorrow and will need follow up with her PCP, Pulmonary, and Allergy  Code: Full    Ishaan Zamorano MD  [facility] HO-I    [facility] Medicine Hospitalist Pager numbers:   [facility] Hospitalist Medicine Team A (Rajinder/Isis): [phone]  [facility] Hospitalist Medicine Team B (Antoni/Carleen):   [phone]

## 2020-10-21 VITALS
HEART RATE: 94 BPM | WEIGHT: 228.63 LBS | RESPIRATION RATE: 19 BRPM | SYSTOLIC BLOOD PRESSURE: 108 MMHG | OXYGEN SATURATION: 92 % | DIASTOLIC BLOOD PRESSURE: 55 MMHG | HEIGHT: 64 IN | BODY MASS INDEX: 39.03 KG/M2 | TEMPERATURE: 97 F

## 2020-10-21 PROBLEM — Z51.5 COMFORT MEASURES ONLY STATUS: Status: ACTIVE | Noted: 2020-10-21

## 2020-10-21 LAB
BACTERIA UR CULT: ABNORMAL
BASOPHILS # BLD AUTO: 0.01 K/UL (ref 0–0.2)
BASOPHILS NFR BLD: 0.1 % (ref 0–1.9)
DIFFERENTIAL METHOD: ABNORMAL
EOSINOPHIL # BLD AUTO: 0 K/UL (ref 0–0.5)
EOSINOPHIL NFR BLD: 0 % (ref 0–8)
ERYTHROCYTE [DISTWIDTH] IN BLOOD BY AUTOMATED COUNT: 14.3 % (ref 11.5–14.5)
GRAM STN SPEC: NORMAL
HCT VFR BLD AUTO: 40.2 % (ref 37–48.5)
HGB BLD-MCNC: 13.8 G/DL (ref 12–16)
IMM GRANULOCYTES # BLD AUTO: 0.14 K/UL (ref 0–0.04)
IMM GRANULOCYTES NFR BLD AUTO: 0.9 % (ref 0–0.5)
LYMPHOCYTES # BLD AUTO: 1.1 K/UL (ref 1–4.8)
LYMPHOCYTES NFR BLD: 6.6 % (ref 18–48)
MCH RBC QN AUTO: 31.3 PG (ref 27–31)
MCHC RBC AUTO-ENTMCNC: 34.3 G/DL (ref 32–36)
MCV RBC AUTO: 91 FL (ref 82–98)
MONOCYTES # BLD AUTO: 0.6 K/UL (ref 0.3–1)
MONOCYTES NFR BLD: 3.8 % (ref 4–15)
NEUTROPHILS # BLD AUTO: 14.6 K/UL (ref 1.8–7.7)
NEUTROPHILS NFR BLD: 88.6 % (ref 38–73)
NRBC BLD-RTO: 0 /100 WBC
PLATELET # BLD AUTO: 321 K/UL (ref 150–350)
PMV BLD AUTO: 9.6 FL (ref 9.2–12.9)
POCT GLUCOSE: 172 MG/DL (ref 70–110)
POCT GLUCOSE: 180 MG/DL (ref 70–110)
RBC # BLD AUTO: 4.41 M/UL (ref 4–5.4)
WBC # BLD AUTO: 16.45 K/UL (ref 3.9–12.7)

## 2020-10-21 PROCEDURE — 25000003 PHARM REV CODE 250: Performed by: STUDENT IN AN ORGANIZED HEALTH CARE EDUCATION/TRAINING PROGRAM

## 2020-10-21 PROCEDURE — 25000242 PHARM REV CODE 250 ALT 637 W/ HCPCS: Performed by: STUDENT IN AN ORGANIZED HEALTH CARE EDUCATION/TRAINING PROGRAM

## 2020-10-21 PROCEDURE — 87205 SMEAR GRAM STAIN: CPT

## 2020-10-21 PROCEDURE — 63600175 PHARM REV CODE 636 W HCPCS: Performed by: STUDENT IN AN ORGANIZED HEALTH CARE EDUCATION/TRAINING PROGRAM

## 2020-10-21 PROCEDURE — 93005 ELECTROCARDIOGRAM TRACING: CPT

## 2020-10-21 PROCEDURE — 94761 N-INVAS EAR/PLS OXIMETRY MLT: CPT

## 2020-10-21 PROCEDURE — 36415 COLL VENOUS BLD VENIPUNCTURE: CPT

## 2020-10-21 PROCEDURE — 85025 COMPLETE CBC W/AUTO DIFF WBC: CPT

## 2020-10-21 PROCEDURE — 94668 MNPJ CHEST WALL SBSQ: CPT

## 2020-10-21 PROCEDURE — 27000221 HC OXYGEN, UP TO 24 HOURS

## 2020-10-21 PROCEDURE — 94640 AIRWAY INHALATION TREATMENT: CPT

## 2020-10-21 PROCEDURE — 93010 EKG 12-LEAD: ICD-10-PCS | Mod: ,,, | Performed by: INTERNAL MEDICINE

## 2020-10-21 PROCEDURE — 93010 ELECTROCARDIOGRAM REPORT: CPT | Mod: ,,, | Performed by: INTERNAL MEDICINE

## 2020-10-21 RX ORDER — DILTIAZEM HYDROCHLORIDE 360 MG/1
360 CAPSULE, EXTENDED RELEASE ORAL DAILY
Qty: 30 CAPSULE | Refills: 11 | Status: SHIPPED | OUTPATIENT
Start: 2020-10-22 | End: 2021-03-11

## 2020-10-21 RX ORDER — PREDNISONE 20 MG/1
40 TABLET ORAL 2 TIMES DAILY
Qty: 20 TABLET | Refills: 0 | Status: SHIPPED | OUTPATIENT
Start: 2020-10-21 | End: 2020-10-26

## 2020-10-21 RX ORDER — NITROFURANTOIN 25; 75 MG/1; MG/1
100 CAPSULE ORAL 2 TIMES DAILY
Qty: 7 CAPSULE | Refills: 0 | Status: SHIPPED | OUTPATIENT
Start: 2020-10-21 | End: 2020-10-29

## 2020-10-21 RX ORDER — DILTIAZEM HYDROCHLORIDE 120 MG/1
360 CAPSULE, COATED, EXTENDED RELEASE ORAL DAILY
Status: DISCONTINUED | OUTPATIENT
Start: 2020-10-22 | End: 2020-10-21 | Stop reason: HOSPADM

## 2020-10-21 RX ORDER — DILTIAZEM HYDROCHLORIDE 120 MG/1
120 CAPSULE, COATED, EXTENDED RELEASE ORAL ONCE
Status: COMPLETED | OUTPATIENT
Start: 2020-10-21 | End: 2020-10-21

## 2020-10-21 RX ORDER — PREDNISONE 20 MG/1
40 TABLET ORAL 2 TIMES DAILY
Status: DISCONTINUED | OUTPATIENT
Start: 2020-10-21 | End: 2020-10-21 | Stop reason: HOSPADM

## 2020-10-21 RX ORDER — GUAIFENESIN 400 MG/1
600 TABLET ORAL 2 TIMES DAILY
Qty: 30 TABLET | Refills: 0 | Status: SHIPPED | OUTPATIENT
Start: 2020-10-21 | End: 2020-10-29

## 2020-10-21 RX ADMIN — PANTOPRAZOLE SODIUM 40 MG: 40 TABLET, DELAYED RELEASE ORAL at 06:10

## 2020-10-21 RX ADMIN — NITROFURANTOIN (MONOHYDRATE/MACROCRYSTALS) 100 MG: 75; 25 CAPSULE ORAL at 08:10

## 2020-10-21 RX ADMIN — GUAIFENESIN 600 MG: 600 TABLET, EXTENDED RELEASE ORAL at 08:10

## 2020-10-21 RX ADMIN — LISINOPRIL 20 MG: 20 TABLET ORAL at 09:10

## 2020-10-21 RX ADMIN — IPRATROPIUM BROMIDE AND ALBUTEROL SULFATE 3 ML: .5; 3 SOLUTION RESPIRATORY (INHALATION) at 11:10

## 2020-10-21 RX ADMIN — IPRATROPIUM BROMIDE AND ALBUTEROL SULFATE 3 ML: .5; 3 SOLUTION RESPIRATORY (INHALATION) at 03:10

## 2020-10-21 RX ADMIN — APIXABAN 5 MG: 5 TABLET, FILM COATED ORAL at 08:10

## 2020-10-21 RX ADMIN — DILTIAZEM HYDROCHLORIDE 240 MG: 120 CAPSULE, COATED, EXTENDED RELEASE ORAL at 08:10

## 2020-10-21 RX ADMIN — VITAM B12 100 MCG: 100 TAB at 08:10

## 2020-10-21 RX ADMIN — POLYETHYLENE GLYCOL (3350) 17 G: 17 POWDER, FOR SOLUTION ORAL at 08:10

## 2020-10-21 RX ADMIN — PREDNISONE 40 MG: 20 TABLET ORAL at 09:10

## 2020-10-21 RX ADMIN — ATORVASTATIN CALCIUM 10 MG: 10 TABLET, FILM COATED ORAL at 08:10

## 2020-10-21 RX ADMIN — ISOSORBIDE MONONITRATE 30 MG: 30 TABLET, EXTENDED RELEASE ORAL at 08:10

## 2020-10-21 RX ADMIN — BISACODYL 5 MG: 5 TABLET, COATED ORAL at 08:10

## 2020-10-21 RX ADMIN — DILTIAZEM HYDROCHLORIDE 120 MG: 120 CAPSULE, EXTENDED RELEASE ORAL at 09:10

## 2020-10-21 RX ADMIN — DULOXETINE HYDROCHLORIDE 20 MG: 20 CAPSULE, DELAYED RELEASE ORAL at 08:10

## 2020-10-21 RX ADMIN — GABAPENTIN 600 MG: 300 CAPSULE ORAL at 08:10

## 2020-10-21 RX ADMIN — METHYLPREDNISOLONE SODIUM SUCCINATE 60 MG: 125 INJECTION, POWDER, FOR SOLUTION INTRAMUSCULAR; INTRAVENOUS at 06:10

## 2020-10-21 RX ADMIN — IPRATROPIUM BROMIDE AND ALBUTEROL SULFATE 3 ML: .5; 3 SOLUTION RESPIRATORY (INHALATION) at 08:10

## 2020-10-21 RX ADMIN — CLOPIDOGREL 75 MG: 75 TABLET, FILM COATED ORAL at 08:10

## 2020-10-21 NOTE — DISCHARGE SUMMARY
[facility] Hospital Medicine Discharge Summary    Primary Team: [facility] Hospitalist Team A  Attending Physician: Zunilda Marinelli MD  Resident: Josh  Intern: Jaun    Date of Admit: 10/19/2020  Date of Discharge: 10/21/2020    Discharge to: Home  Condition: Stable    Discharge Diagnoses     Patient Active Problem List   Diagnosis    Enlarged ovary    Controlled type 2 diabetes mellitus with diabetic polyneuropathy, without long-term current use of insulin    Severe persistent asthma with acute exacerbation    Chronic mesenteric ischemia    Mesenteric artery insufficiency    Gastroesophageal reflux disease    Essential hypertension    Hyperlipidemia    Old lacunar stroke without late effect    COPD exacerbation    Constipation    Severe aortic stenosis    Umbilical hernia without obstruction and without gangrene    Incisional hernia, without obstruction or gangrene    COPD (chronic obstructive pulmonary disease)    Chronic atrial fibrillation with rapid ventricular response    Costochondritis    Diabetic polyneuropathy associated with type 2 diabetes mellitus    DDD (degenerative disc disease), lumbar    Cataract    Low back pain    Difficulty walking    Muscle weakness    Balance problems    Decreased range of motion of trunk and back    Lumbar spondylosis    Neuroforaminal stenosis of lumbar spine    Spinal stenosis of lumbar region with neurogenic claudication    Lumbar radiculopathy    S/P TAVR (transcatheter aortic valve replacement)    Morbid obesity    Acute on chronic diastolic (congestive) heart failure    Vasovagal syncope    TACOS (acute kidney injury)    Headache    Persistent atrial fibrillation    Atrial flutter    Hyponatremia    Atrial fibrillation with RVR    Leukemoid reaction    Dysuria    Comfort measures only status       Consultants and Procedures     Consultants:  none    Procedures:   none    Imaging:  X-Ray Chest AP Portable   Final Result      No  significant infiltrates relative to 10/19/2020, 17:50 hours.         Electronically signed by: Jack Blanc   Date:    10/20/2020   Time:    22:33      X-Ray Chest PA And Lateral   Final Result      1. No convincing acute cardiopulmonary process, improved aeration.  No left consolidation as clinically questioned.         Electronically signed by: Tesfaye Murray MD   Date:    10/19/2020   Time:    18:12      X-Ray Chest AP Portable   Final Result      No obvious evidence of an acute pulmonary process.         Electronically signed by: Krzysztof Alcantara   Date:    10/19/2020   Time:    10:52      X-Ray Chest PA And Lateral    (Results Pending)       Brief History of Present Illness      Adriana Strong is a 75 y.o. white female Who has a PMH of aortic stenosis s/p TAVR on 04/19, CVA/TIA, AFib, diabetes myelitis, HTN, asthma, cataracts, GERD, HLD, irritable bowel syndrome who presents to the ED on 10/19/20 with chief complaint of shortness of breath.  Given 1 hr treatment of albuterol nebulizer solution in addition to methylprednisolone 125 mg in the ED with little improvement in her shortness of breath.  Additionally, heart rate was elevated to 150 on presentation and was given 35 mg of diltiazem with brief improvement in her heart rate.  Per cardiology, patient's tachycardia should improve with improvement in her respiratory status.  She was admitted for continued breathing treatments and steroids. Patient showed marked improvement in her shortness of breath after treatment was deemed stable for discharge on 5 day course of oral prednisone with instructions to follow up with her PCP and referrals placed to pulmonology and allergy/immunology.    For the full HPI please refer to the History & Physical from this admission.    Hospital Course By Problem with Pertinent Findings     Acute on chronic asthma exacerbation  History of asthma with approximately 2 exacerbations per year presenting with cough, shortness of  breath, chest tightness for approximately 1 week that has not improved on oral steroids or with home breathing treatments  - Received nebulized albuterol for 1 hr in addition to 125 mg of methylprednisolone in the ED with little improvement in shortness of breath  - Chest x-ray with no acute cardiopulmonary process although questionable hazy opacity and left lower lobe.  - Repeat chest x-ray PA and lateral with no obvious evidence of an acute pulmonary process.  - Received DuoNebs  - Given Methylprednisolone IV x2 days  switched to PO prednisone to complete 5 day course  - Patient was previously on montelukast 10 mg but has since switched to zafirlukast. Patient restarted on montelukast as zafirlukast is not carried here  zafirlukast resumed on discharge  - Guaifenesin 600 mg bid  - Chest physiotherapy preformed     Leukocytosis in the setting of recent steroid use  - Patient was initially started on prednisone 10 mg this past Tuesday which was increased to 20 mg on Friday; White count on admit 19.5  - No fever, chills, sputum production, or other signs and symptoms of infection  - Likely 2/2 steroid use  - Trending down prior to discharge     Chronic AFib with RVR  On presentation to the ED patient had a heart rate of 150; patient currently denies any palpitations, reports some lightheadedness on admit, but has since been asymptomatic   - EKG with sinus tachycardia but appears to be in AFib on tele in the ED  - Repeat EKG: Atrial fibrillation with rapid ventricular response  - Received 35 mg of diltiazem IV in the ED with temporary improvement in heart rate  - Patient discussed with Cardiology, HR will likely improve with improvement in her respiratory status  - Continue home Eliquis 5 mg and diltiazem 240 mg  - Follows with Dr. Bee and Dr. Carrington   - Patient has appointment scheduled with Dr. Carrington on 12/08/20  - HR improved  - Episode of aching 10/10 chest pain while receiving breathing treatments night of  10/20. Improved to 6/10 after about a min. Trop 0.014, CXR unremarkable.   - EKG atrial flutter with variable A-V block  - Repeat EKG: sinus tach  - Increase diltiazem to 350 mg prior to discharge     Urinary Tract Infection  Patient complaining of dysuria and increased frequency that has started this week.   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 Bee and Dr. Carrington  - Patient has appointment scheduled with Dr. Carrington on 12/08/20     Type 2 diabetes myelitis  - On metformin 500 mg BID  will hold while inpatient  - A1c on 10/20/20 5.9  - Sliding scale insulin  - Accu-Cheks     HTN  - Patient currently on HCTZ 12.5 mg, Imdur 30 mg, lisinopril 20 mg at home  - Imdur 30 mg and lisinopril 20 mg continued  - HCTZ 12.5 mg held in the setting of contraction alkalosis; will discontinue at discharge     GERD  - Continue Protonix 40 mg daily     Chronic constipation  - Continue Dulcolax 5 mg and polyethylene glycol 17 mg     HLD  - Continue atorvastatin 10 mg     Anxiety  - Continue duloxetine 20 mg     Chronic back pain  - Continue gabapentin 6 on did mg TID      Possible RADHA  Patient reports some symptoms of RADHA in the setting of multiple risk factors and pulmonary HTN  - Will need f/u with Pulmonary on discharge and outpatient PSG    Discharge Medications        Medication List      START taking these medications    diltiaZEM 360 MG Cs24  Commonly known as: TIAZAC  Take 1 capsule (360 mg total) by mouth once daily.  Start taking on: October 22, 2020  Replaces: diltiaZEM 240  MG 24 hr capsule     guaiFENesin 400 mg Tab  Commonly known as: HUMIBID E  Take 1.5 tablets (600 mg total) by mouth 2 (two) times daily.     nitrofurantoin (macrocrystal-monohydrate) 100 MG capsule  Commonly known as: MACROBID  Take 1 capsule (100 mg total) by mouth 2 (two) times a day. For UTI     predniSONE 20 MG tablet  Commonly known as: DELTASONE  Take 2 tablets (40 mg total) by mouth 2 (two) times daily. for 5 days        CONTINUE taking these medications    * VENTOLIN HFA 90 mcg/actuation inhaler  Generic drug: albuterol     * albuterol 1.25 mg/3 mL Nebu  Commonly known as: ACCUNEB  Take scheduled q4 for the next two days while at home then resume prn dosing     apixaban 5 mg Tab  Commonly known as: ELIQUIS  Take 1 tablet (5 mg total) by mouth 2 (two) times daily.     atorvastatin 10 MG tablet  Commonly known as: LIPITOR  Take 1 tablet (10 mg total) by mouth once daily.     azelastine 137 mcg (0.1 %) nasal spray  Commonly known as: ASTELIN     bisacodyL 5 mg EC tablet  Commonly known as: DULCOLAX     calcium carbonate 600 mg calcium (1,500 mg) Tab  Commonly known as: OS-MICHAEL     CALTRATE 600 + D ORAL     CENTRUM SILVER ORAL     clopidogreL 75 mg tablet  Commonly known as: PLAVIX  Take 1 tablet (75 mg total) by mouth once daily.     DULoxetine 20 MG capsule  Commonly known as: CYMBALTA  TAKE 1 CAPSULE BY MOUTH EVERY DAY     fexofenadine 60 MG tablet  Commonly known as: ALLEGRA     fish oil-omega-3 fatty acids 300-1,000 mg capsule     fluticasone propionate 50 mcg/actuation nasal spray  Commonly known as: FLONASE     furosemide 40 MG tablet  Commonly known as: LASIX  TAKE 1 TABLET BY MOUTH EVERY DAY     gabapentin 300 MG capsule  Commonly known as: NEURONTIN  Take 2 capsules (600 mg total) by mouth 3 (three) times daily.     isosorbide mononitrate 30 MG 24 hr tablet  Commonly known as: IMDUR  Take 1 tablet (30 mg total) by mouth once daily.     lisinopriL 20 MG tablet  Commonly known as: PRINIVIL,ZESTRIL  Take 1  tablet (20 mg total) by mouth once daily.     LORazepam 0.5 MG tablet  Commonly known as: ATIVAN     metFORMIN 500 MG tablet  Commonly known as: GLUCOPHAGE     nitroGLYCERIN 0.4 MG SL tablet  Commonly known as: NITROSTAT  Place 1 tablet (0.4 mg total) under the tongue every 5 (five) minutes as needed for Chest pain.     ondansetron 4 MG Tbdl  Commonly known as: ZOFRAN-ODT     oxybutynin 5 MG Tr24  Commonly known as: DITROPAN-XL     polyethylene glycol 17 gram Pwpk  Commonly known as: GLYCOLAX     potassium gluconate 550 mg (90 mg) Tab     REFRESH DRY EYE THERAPY OPHT     SYMBICORT 160-4.5 mcg/actuation Hfaa  Generic drug: budesonide-formoterol 160-4.5 mcg     TYLENOL ARTHRITIS 650 MG Tbsr  Generic drug: acetaminophen     VITAMIN B-12 1000 MCG tablet  Generic drug: cyanocobalamin     VITAMIN C 500 MG tablet  Generic drug: ascorbic acid (vitamin C)     vitamin D 1000 units Tab  Commonly known as: VITAMIN D3     zafirlukast 20 MG tablet  Commonly known as: ACCOLATE         * This list has 2 medication(s) that are the same as other medications prescribed for you. Read the directions carefully, and ask your doctor or other care provider to review them with you.            STOP taking these medications    ciclopirox 1 % shampoo     cimetidine 200 MG tablet  Commonly known as: TAGAMET     clobetasoL 0.05 % external solution  Commonly known as: TEMOVATE     diltiaZEM 240 MG 24 hr capsule  Commonly known as: CARDIZEM CD  Replaced by: diltiaZEM 360 MG Cs24     docusate sodium 100 MG capsule  Commonly known as: COLACE     ELOCON 0.1 % cream  Generic drug: mometasone 0.1%     fluconazole 150 MG Tab  Commonly known as: DIFLUCAN     hydroCHLOROthiazide 12.5 mg capsule  Commonly known as: MICROZIDE     losartan 50 MG tablet  Commonly known as: COZAAR     magnesium 250 mg Tab     montelukast 10 mg tablet  Commonly known as: SINGULAIR     nystatin powder  Commonly known as: MYCOSTATIN     pantoprazole 40 MG tablet  Commonly known  as: PROTONIX     traMADoL 50 mg tablet  Commonly known as: ULTRAM     triamcinolone acetonide 0.1% 0.1 % cream  Commonly known as: KENALOG           Where to Get Your Medications      These medications were sent to [facility] [address] [address] [place] [id number]    Hours: Mon-Fri, 8a-5:30p Phone: [phone]   · diltiaZEM 360 MG Cs24  · guaiFENesin 400 mg Tab  · nitrofurantoin (macrocrystal-monohydrate) 100 MG capsule  · predniSONE 20 MG tablet         Discharge Information:   Diet:  Diabetic    Physical Activity:  Advance as tolerated             Instructions:  1. Take all medications as prescribed  2. Keep all follow-up appointments  3. Return to the hospital or call your primary care physicians if any worsening symptoms such as fever, chest pain, shortness of breath, return of symptoms, or any other concerns.    Follow-Up Appointments:  Future Appointments   Date Time Provider Department Center   11/19/2020  9:45 AM LAB, SAME DAY [facility] LAB VNP [facility]   11/19/2020 10:15 AM ECHO, [facility] ECHOSTR [facility]   11/19/2020 11:30 AM [facility] CARDVAL [facility]   12/8/2020 10:00 AM Jr Carrington MD [facility] CARDIO [facility]   PCP, Pulmonary, and Allergy    Ishaan Zamorano MD  [facility], [facility]

## 2020-10-21 NOTE — PLAN OF CARE
Patient on oxygen with documented flow.  Manual CPT complete, and patient tolerated well.  Will continue to monitor, and wean o2 as tolerated.

## 2020-10-21 NOTE — MEDICAL/APP STUDENT
"[facility] Medicine Progress Note    Primary Team: [facility] Hospitalist Team A  Attending Physician: Zunilda Marinelli MD  Resident: Josh  Intern: Jaun    Subjective:   Last night, the patient experienced palpitations followed by an episode of chest discomfort described as pressure that waxed and waned over 4-5 minutes, 10/10 at its maximum intensity. Evaluation returned a negative troponin, no change in CXR from the previous, and an EKG with atrial flutter with variable A-V block . This AM, patient reported improvement in her SOB at rest and with ambulation. She endorsed feeling near her baseline and was agreeable to the idea of being discharged. Denied any current chest pain or suprapubic pain; the cough is her greatest concern.     Objective:     Last 24 Hour Vital Signs:  BP  Min: 127/66  Max: 145/70  Temp  Av.3 °F (36.3 °C)  Min: 96.6 °F (35.9 °C)  Max: 98.2 °F (36.8 °C)  Pulse  Av  Min: 82  Max: 125  Resp  Av.3  Min: 18  Max: 24  SpO2  Av.3 %  Min: 91 %  Max: 98 %  Weight  Av.7 kg (228 lb 9.9 oz)  Min: 103.7 kg (228 lb 9.9 oz)  Max: 103.7 kg (228 lb 9.9 oz)  I/O last 3 completed shifts:  In: 180 [P.O.:180]  Out: 3600 [Urine:3600]    Physical Examination:  /88 (BP Location: Left arm, Patient Position: Sitting)   Pulse (!) 121   Temp 97.3 °F (36.3 °C) (Oral)   Resp 20   Ht 5' 4" (1.626 m)   Wt 103.7 kg (228 lb 9.9 oz)   LMP 1973 (LMP Unknown)   SpO2 98%   Breastfeeding No   BMI 39.24 kg/m²       General appearance: Awake, alert, obese female. Appears stated age. Cooperative with appropriate eye contact. No acute distress.  Head: Normocephalic, atraumatic. No obvious deformity.  Neck: No JVD. Supple, symmetrical midline trachea.  Lungs: No visible retractions. Diminished breaths sounds. Wheezing throughout. No crackles.   Heart: Tachycardic 122, irregular rhythm. S1 and S2 present. No click, rub, or gallop.   Abdomen: Soft, non-distended. Non-tender. " Bowel sounds throughout.   Extremities: Extremities normal, atraumatic. No cyanosis, trace edema to BLEs.  Pulses: 2+ and symmetric radial pulses. 2+ and symmetric distal pulses.  Skin: Skin pale pink. No rashes or lesions. Diffuse dilated vessels to BLEs.  Neurologic: AAOx4. Grossly intact. Able to move all extremities. Diminished strength on left side.    Laboratory:  Laboratory Data Reviewed: yes  Pertinent Findings:  WBC 16.45    Microbiology Data Reviewed: yes  Pertinent Findings:  Covid-19: Negative  Influenza A/B: Negative  UA: 1+ leukocytes, moderate bacteria, 1+ protein and glucose, trace occult blood  Urine cx: GNR, identification and culture pending  Respiratory cx: Pending    Other Results:  EKG (my interpretation):   10/20/2020 Atrial flutter 105. Normal axis. No STEMI.  10/19/2020 Sinus tachycardia 143. Normal axis. No STEMI.    Radiology Data Reviewed: yes  Pertinent Findings:    CXR PA and Lateral  1. No convincing acute cardiopulmonary process, improved aeration.  No left consolidation as clinically questioned.    Current Medications:     Infusions:       Scheduled:   albuterol-ipratropium  3 mL Nebulization Q4H    apixaban  5 mg Oral BID    atorvastatin  10 mg Oral Daily    bisacodyL  5 mg Oral Daily    clopidogreL  75 mg Oral Daily    cyanocobalamin  100 mcg Oral Daily    diltiaZEM  240 mg Oral Daily    DULoxetine  20 mg Oral Daily    gabapentin  600 mg Oral TID    guaiFENesin  600 mg Oral BID    isosorbide mononitrate  30 mg Oral Daily    lisinopriL  20 mg Oral Daily    methylPREDNISolone sodium succinate  60 mg Intravenous Q8H    montelukast  10 mg Oral QHS    nitrofurantoin (macrocrystal-monohydrate)  100 mg Oral BID    oxybutynin  5 mg Oral Every other day    pantoprazole  40 mg Oral QAM    polyethylene glycol  17 g Oral Daily        PRN:  acetaminophen, dextrose 50%, dextrose 50%, glucagon (human recombinant), glucose, glucose, insulin aspart U-100, sodium chloride  0.9%    Antibiotics and Day Number of Therapy:  PO Nitrofurantoin x 2 day    Lines and Day Number of Therapy:  PIV R Forearm x 2 day    Assessment:     Adriana Strong is a 74 yo female with a past medical history of asthma, chronic diastolic HF, atrial fibrillation on Eliquis, aortic stenosis s/p TAVR (11/2019), CAD, HTN, HLD, T2DM, stroke, GERD, anxiety, and arthritis who [place] [facility] [place] with a complaint of shortness of breath, dry cough, and achy chest discomfort for 6 days. Patient was seen by her PCP twice last week and provided prednisone without relief of symptoms. On arrival patient was hypertensive and tachycardic at 150 with a normal oxygen saturation. ED management included an hour long breathing treatment and IV diltiazem 35 mg over two doses. Inpatient admission for further evaluation of acute asthma exacerbation and atrial fibrillation with RVR.     Plan:      Asthma Exacerbation, persistent  Patient presented with progressive SOB, chest discomfort, and dry cough for 6 days unrelieved by prednisone or home breathing treatments.  -ED course: On admission, SpO2 99% on RA, T 99.2. BNP 90, Covid negative. Portable CXR without acute process.  -CXR PA and Lateral with no acute cardiopulmonary process.  -Ordered sputum gram stain and culture. Pending patient collection of sample.  -Influenza A/B negative. Consider obtaining respiratory viral panel.  -Home meds include albuterol inhaler and nebulizer, budesonide-formoterol inhaler, zafirlukast.   -Provide albuterol-ipratropium nebulizer Q4H, IV methylprednisolone 60 Q6H, and montelukast 10 QHS, and guaifenesin 600 BID. Transition to PO Prednisone 40 BID.  -Continue chest physiotherapy BID.  -Obtain PEF measure. Educate for at home use.  -Consider obtaining ABG for worsening symptoms.  -Provide regular pulse ox monitoring and supplemental NC for SpO2 <92%.  -Will require f/u with PCP on discharge.     Atrial Fibrillation with RVR  On arrival,  sinus tachycardia 150s. Afib with RVR captured on monitor, 110s-130s, intermittent with sinus tachycardia. Initial troponin 0.008.  -ED course: Given IV Diltiazem 35 mg in two doses. HR remained in 140s.  -Has required multiple cardioversions in the past. Latest in May 2020. Patient follows Dr. Hany Bee and rJ Carrington.  -Home meds include diltiazem 240 QD and apixaban 5 BID.   -Consult cardiology. Obtain TTE. Obtain repeat EKG.  -Room for better rate control with additional episodes of Afib w/ RVR overnight. HR 90s-120s over last 12 hrs. Increased Diltiazem to 360 QD.  -Will require f/u with cardiologist on d/c.     Urinary Tract Infection, symptomatic  Patient presented with complaints of dysuria. Patient afebrile and w/o renal involvement.  -Leukocytosis on admission to 19.51. Fluctuating. Latest 16.45.  -UA:1+ leukocytes, moderate bacteria, 1+ protein and glucose, trace blood.  -UC with GNRs, identification and sensitivity pending. Procalcitonin 0.09.  -Initiated Nitrofurantoin 100 mg BID x 5 days.     Transaminitis  AST 45 ALT 55 on admission.   -Likely related to regular use of acetaminophen 1300 mg BID.  -Consider obtaining acetaminophen level.  -Will continue to monitor.     HTN  -218/102 on arrival to ED. Resolved to 142/87 within minutes.  -Home meds include Hctz 12.5 QD, isosorbide mononitrate 30 QD, lisinopril 20 mg, losartan 50 BID.  -Losartan held. D/c Hctz.  -Latest 135/88.     Aortic stenosis, s/p TAVR  Bioprosthetic TAVR in 11/2019. No aortic insufficiency on echo 5/15/2020.  -Takes clopidogrel 75 QD. at home  -No acute concerns. Will continue to monitor.      Chronic Diastolic CHF  Echo (5/15/2020) with normal LV/RV systolic function with EF 60%. No aortic insufficiency.  -No signs of overt volume overload on exam.   -Consider initiation of IV furosemide. PO 40 furosemide at home.     CAD  [facility] (11/5/2019): LM free of disease, LAD with long tubular stenosis of 70% at diagonal bifurcation.  Normal LCx and RCA.  -Lipid panel (8/25/2020): Cholesterol 161, LDL 75.2, HDL 75  -Home meds include atorvastatin 10 mg QD and fish oil-omega-3 fatty acids 1g QD. Will continue atorvastatin.     Type II Diabetes Mellitus  Latest A1c 5.9 (10/20/2020).  -Home meds include metformin 500 BID.  -SSI initiated.     GERD  -Will continue pantoprazole 40 QD.      Anxiety  -Will continue home duloxetine 20 QD.  -No acute concerns.     IBS  -Will continue polyethylene glycol 17g QD and bisacodyl EC 5 QD.     Musculoskeletal Pain  Patient reported chronic back pain and arthritis.  -Home meds include acetaminophen 1300 BID and gabapentin 600 TID.  -No acute concerns at this time.     Code: Full  Diet: Diabetic  PPx: SCD  HCM: PCP Krzysztof Mcgraw  Disposition: discharge pending symptomatic improvement and tolerance of PO CS    Stfef Mccracken  [facility] Internal Medicine PA-S2    [facility] Hospitalist Pager numbers:   [facility] Hospitalist Medicine Team A (Rajinder/Isis): [phone]  [facility] Hospitalist Medicine Team B (Antoni/Carleen):  [phone]

## 2020-10-21 NOTE — NURSING
"After receiving a breathing treatment the patient complained of 10/10 chest pain, aching in character. She says it is very strong 10/10 for about a minute and then it decreases to 6/10 for about a minute and then it increases again. Notified Dr. Wharton he said "he would come and assess her". STAT EKG, troponin and CXR order placed.  "

## 2020-10-21 NOTE — PLAN OF CARE
Plan of care reviewed with patient. Pt verbalized understanding. Pt c/o chest pain earlier in the night, pt rated pain 10/10. Chest X ray, EKG and troponin ordered. Pt is now resting comfortably, A Fib on Tele, no true red alarms, no distress noticed. Pt does have a congested cough, nonproductive. Sputum collection cup at bedside. Pt is resting, bed in lowest position, bed alarm on, HOB elevated, side rails up x 2. The pt's cane, bedside table and call light is within reach. Will continue to assess pt until end of shift.

## 2020-10-21 NOTE — PLAN OF CARE
Discharge rounds on patient. Discussed followup appointments, blue discharge folder. Pharmacist will go over home medications and reasons for medications. VN to reiterate final discharge instructions. All patient/caregiver questions answered. Patient verbalized understanding.    SCHED ALLERGY at [facility] w/Dr Jarrod Corrales on 11/19 at 3:30. Allergy provider is at [facility] 1X per month so this is the earliest that can be scheduled.    SCHED PULM at [facility] w/Dr Venus Hilton on 10/26 at 2:20    Dr Krzysztof Mcgraw's office closed for lunch until 1:20 per Answering Service.     Krzysztof Mcgraw MD  Schedule an appointment as soon as possible for a visit in 1 week  Inpatient hospital follow up  [address]  [address]  [place] [address]  [phone]   [facility] - Allergy/Immunology  Go on 11/19/2020  at 330 pm; FOLLOW UP WITH ALLERGIST REFERRAL, CLINICALS FAXED TO OFFICE  [address]  [place] [address]  [phone]   [facility] - Pulmonology  Go on 10/26/2020  at 220pm; FOLLOW UP WITH PULMONOLOGIST, CLINICALS FAXED TO OFFICE  [address]  [place] [address]  [phone]   Jr Carrington MD  Go on 11/12/2020  at 1030am; FOLLOW UP WITH CARDIOLOGIST AFTER DISCHARGE  [address]  [address]  [place] [address]  [phone]       Future Appointments   Date Time Provider Department Center   11/12/2020 10:30 AM Jr Carrington MD [facility] CARDIO [place] [facility]   11/19/2020  9:45 AM LAB, SAME DAY [facility]   11/19/2020 10:15 AM ECHO, [facility] ECHOSTR [facility]   11/19/2020 11:30 AM [facility]   12/8/2020 10:00 AM Jr Carrington MD [facility] C[facility]        10/21/20 1254   Final Note   Assessment Type Final Discharge Note   Anticipated Discharge Disposition Home   Hospital Follow Up  Appt(s) scheduled? Yes   Discharge plans and expectations educations in teach back method with documentation complete? Yes   Right Care Referral  Info   Post Acute Recommendation No Care   Post-Acute Status   Discharge Delays None known at this time     Clara May RN  RN Transition Navigator  [phone]

## 2020-10-21 NOTE — PLAN OF CARE
Introduced as VN and will be reviewing discharge instructions.  Educated patient and  on reason for admission, home medication list, and discharge instructions including when to return to ED and the following doctor appointments.  Education per flowsheet.  Opportunity given for questions and questions answered. Nurse notified of discharge complete. Patient is waiting on wheelchair

## 2020-10-21 NOTE — PROGRESS NOTES
[facility]                    Pharmacy       Discharge Medication Education    Patient ACCEPTED medication education. Pharmacy has provided education on the name, indication, and possible side effects of the medication(s) prescribed, using teach-back method.     The following medications have also been discussed, during this admission.        Medication List        START taking these medications      diltiaZEM 360 MG Cs24  Commonly known as: TIAZAC  Take 1 capsule (360 mg total) by mouth once daily.  Start taking on: October 22, 2020  Replaces: diltiaZEM 240 MG 24 hr capsule     MUCUS RELIEF 400 mg Tab  Generic drug: guaiFENesin  Take 1.5 tablets (600 mg total) by mouth 2 (two) times daily.     nitrofurantoin (macrocrystal-monohydrate) 100 MG capsule  Commonly known as: MACROBID  Take 1 capsule (100 mg total) by mouth 2 (two) times a day. For UTI     predniSONE 20 MG tablet  Commonly known as: DELTASONE  Take 2 tablets (40 mg total) by mouth 2 (two) times daily. for 5 days            CONTINUE taking these medications      * VENTOLIN HFA 90 mcg/actuation inhaler  Generic drug: albuterol     * albuterol 1.25 mg/3 mL Nebu  Commonly known as: ACCUNEB  Take scheduled q4 for the next two days while at home then resume prn dosing     apixaban 5 mg Tab  Commonly known as: ELIQUIS  Take 1 tablet (5 mg total) by mouth 2 (two) times daily.     atorvastatin 10 MG tablet  Commonly known as: LIPITOR  Take 1 tablet (10 mg total) by mouth once daily.     azelastine 137 mcg (0.1 %) nasal spray  Commonly known as: ASTELIN     bisacodyL 5 mg EC tablet  Commonly known as: DULCOLAX     calcium carbonate 600 mg calcium (1,500 mg) Tab  Commonly known as: OS-MICHAEL     CALTRATE 600 + D ORAL     CENTRUM SILVER ORAL     clopidogreL 75 mg tablet  Commonly known as: PLAVIX  Take 1 tablet (75 mg total) by mouth once daily.     DULoxetine 20 MG capsule  Commonly known as: CYMBALTA  TAKE 1 CAPSULE BY MOUTH EVERY DAY      fexofenadine 60 MG tablet  Commonly known as: ALLEGRA     fish oil-omega-3 fatty acids 300-1,000 mg capsule     fluticasone propionate 50 mcg/actuation nasal spray  Commonly known as: FLONASE     furosemide 40 MG tablet  Commonly known as: LASIX  TAKE 1 TABLET BY MOUTH EVERY DAY     gabapentin 300 MG capsule  Commonly known as: NEURONTIN  Take 2 capsules (600 mg total) by mouth 3 (three) times daily.     isosorbide mononitrate 30 MG 24 hr tablet  Commonly known as: IMDUR  Take 1 tablet (30 mg total) by mouth once daily.     lisinopriL 20 MG tablet  Commonly known as: PRINIVIL,ZESTRIL  Take 1 tablet (20 mg total) by mouth once daily.     LORazepam 0.5 MG tablet  Commonly known as: ATIVAN     metFORMIN 500 MG tablet  Commonly known as: GLUCOPHAGE     nitroGLYCERIN 0.4 MG SL tablet  Commonly known as: NITROSTAT  Place 1 tablet (0.4 mg total) under the tongue every 5 (five) minutes as needed for Chest pain.     ondansetron 4 MG Tbdl  Commonly known as: ZOFRAN-ODT     oxybutynin 5 MG Tr24  Commonly known as: DITROPAN-XL     polyethylene glycol 17 gram Pwpk  Commonly known as: GLYCOLAX     potassium gluconate 550 mg (90 mg) Tab     REFRESH DRY EYE THERAPY OPHT     SYMBICORT 160-4.5 mcg/actuation Hfaa  Generic drug: budesonide-formoterol 160-4.5 mcg     TYLENOL ARTHRITIS 650 MG Tbsr  Generic drug: acetaminophen     VITAMIN B-12 1000 MCG tablet  Generic drug: cyanocobalamin     VITAMIN C 500 MG tablet  Generic drug: ascorbic acid (vitamin C)     vitamin D 1000 units Tab  Commonly known as: VITAMIN D3     zafirlukast 20 MG tablet  Commonly known as: ACCOLATE           * This list has 2 medication(s) that are the same as other medications prescribed for you. Read the directions carefully, and ask your doctor or other care provider to review them with you.                STOP taking these medications      ciclopirox 1 % shampoo     cimetidine 200 MG tablet  Commonly known as: TAGAMET     clobetasoL 0.05 % external  solution  Commonly known as: TEMOVATE     diltiaZEM 240 MG 24 hr capsule  Commonly known as: CARDIZEM CD  Replaced by: diltiaZEM 360 MG Cs24     docusate sodium 100 MG capsule  Commonly known as: COLACE     ELOCON 0.1 % cream  Generic drug: mometasone 0.1%     fluconazole 150 MG Tab  Commonly known as: DIFLUCAN     hydroCHLOROthiazide 12.5 mg capsule  Commonly known as: MICROZIDE     losartan 50 MG tablet  Commonly known as: COZAAR     magnesium 250 mg Tab     montelukast 10 mg tablet  Commonly known as: SINGULAIR     nystatin powder  Commonly known as: MYCOSTATIN     pantoprazole 40 MG tablet  Commonly known as: PROTONIX     traMADoL 50 mg tablet  Commonly known as: ULTRAM     triamcinolone acetonide 0.1% 0.1 % cream  Commonly known as: KENALOG               Where to Get Your Medications        These medications were sent to [facility]  [address], [place] [address]      Hours: Mon-Fri, 8a-5:30p Phone: [phone]   diltiaZEM 360 MG Cs24  MUCUS RELIEF 400 mg Tab  nitrofurantoin (macrocrystal-monohydrate) 100 MG capsule  predniSONE 20 MG tablet          Thank you  Maurice Garza, PharmD  [phone]

## 2020-10-22 ENCOUNTER — PATIENT OUTREACH (OUTPATIENT)
Dept: ADMINISTRATIVE | Facility: CLINIC | Age: 75
End: 2020-10-22

## 2020-10-22 LAB
BACTERIA SPEC AEROBE CULT: ABNORMAL
BACTERIA SPEC AEROBE CULT: ABNORMAL
GRAM STN SPEC: ABNORMAL

## 2020-10-29 ENCOUNTER — HOSPITAL ENCOUNTER (OUTPATIENT)
Facility: HOSPITAL | Age: 75
Discharge: HOME | End: 2020-10-30
Attending: EMERGENCY MEDICINE | Admitting: INTERNAL MEDICINE
Payer: MEDICARE

## 2020-10-29 DIAGNOSIS — I10 ESSENTIAL HYPERTENSION: ICD-10-CM

## 2020-10-29 DIAGNOSIS — J45.909 CHRONIC ASTHMA WITHOUT COMPLICATION, UNSPECIFIED ASTHMA SEVERITY, UNSPECIFIED WHETHER PERSISTENT: ICD-10-CM

## 2020-10-29 DIAGNOSIS — I48.91 ATRIAL FIBRILLATION WITH RAPID VENTRICULAR RESPONSE: ICD-10-CM

## 2020-10-29 DIAGNOSIS — I48.91 ATRIAL FIBRILLATION: ICD-10-CM

## 2020-10-29 DIAGNOSIS — R07.9 CHEST PAIN: Primary | ICD-10-CM

## 2020-10-29 DIAGNOSIS — K21.9 GASTROESOPHAGEAL REFLUX DISEASE, UNSPECIFIED WHETHER ESOPHAGITIS PRESENT: ICD-10-CM

## 2020-10-29 DIAGNOSIS — E87.1 HYPONATREMIA: ICD-10-CM

## 2020-10-29 DIAGNOSIS — I48.19 PERSISTENT ATRIAL FIBRILLATION: ICD-10-CM

## 2020-10-29 DIAGNOSIS — R00.2 PALPITATIONS: ICD-10-CM

## 2020-10-29 DIAGNOSIS — D72.823 LEUKEMOID REACTION: ICD-10-CM

## 2020-10-29 DIAGNOSIS — R07.2 PRECORDIAL PAIN: ICD-10-CM

## 2020-10-29 DIAGNOSIS — K55.1 CHRONIC MESENTERIC ISCHEMIA: ICD-10-CM

## 2020-10-29 LAB
ALBUMIN SERPL BCP-MCNC: 3.9 G/DL (ref 3.5–5.2)
ALP SERPL-CCNC: 40 U/L (ref 55–135)
ALT SERPL W/O P-5'-P-CCNC: 58 U/L (ref 10–44)
ANION GAP SERPL CALC-SCNC: 13 MMOL/L (ref 8–16)
AST SERPL-CCNC: 28 U/L (ref 10–40)
BASOPHILS # BLD AUTO: 0.03 K/UL (ref 0–0.2)
BASOPHILS NFR BLD: 0.2 % (ref 0–1.9)
BILIRUB SERPL-MCNC: 0.6 MG/DL (ref 0.1–1)
BNP SERPL-MCNC: 59 PG/ML (ref 0–99)
BUN SERPL-MCNC: 21 MG/DL (ref 8–23)
CALCIUM SERPL-MCNC: 8.8 MG/DL (ref 8.7–10.5)
CHLORIDE SERPL-SCNC: 90 MMOL/L (ref 95–110)
CO2 SERPL-SCNC: 26 MMOL/L (ref 23–29)
CREAT SERPL-MCNC: 1 MG/DL (ref 0.5–1.4)
DIFFERENTIAL METHOD: ABNORMAL
EOSINOPHIL # BLD AUTO: 0 K/UL (ref 0–0.5)
EOSINOPHIL NFR BLD: 0 % (ref 0–8)
ERYTHROCYTE [DISTWIDTH] IN BLOOD BY AUTOMATED COUNT: 14.2 % (ref 11.5–14.5)
EST. GFR  (AFRICAN AMERICAN): >60 ML/MIN/1.73 M^2
EST. GFR  (NON AFRICAN AMERICAN): 54 ML/MIN/1.73 M^2
GLUCOSE SERPL-MCNC: 305 MG/DL (ref 70–110)
HCT VFR BLD AUTO: 42.5 % (ref 37–48.5)
HGB BLD-MCNC: 14.6 G/DL (ref 12–16)
IMM GRANULOCYTES # BLD AUTO: 0.45 K/UL (ref 0–0.04)
IMM GRANULOCYTES NFR BLD AUTO: 2.6 % (ref 0–0.5)
LYMPHOCYTES # BLD AUTO: 0.9 K/UL (ref 1–4.8)
LYMPHOCYTES NFR BLD: 5.2 % (ref 18–48)
MCH RBC QN AUTO: 31.3 PG (ref 27–31)
MCHC RBC AUTO-ENTMCNC: 34.4 G/DL (ref 32–36)
MCV RBC AUTO: 91 FL (ref 82–98)
MONOCYTES # BLD AUTO: 0.4 K/UL (ref 0.3–1)
MONOCYTES NFR BLD: 2.2 % (ref 4–15)
NEUTROPHILS # BLD AUTO: 15.8 K/UL (ref 1.8–7.7)
NEUTROPHILS NFR BLD: 89.8 % (ref 38–73)
NRBC BLD-RTO: 0 /100 WBC
PLATELET # BLD AUTO: 265 K/UL (ref 150–350)
PMV BLD AUTO: 9.9 FL (ref 9.2–12.9)
POCT GLUCOSE: 132 MG/DL (ref 70–110)
POCT GLUCOSE: 195 MG/DL (ref 70–110)
POTASSIUM SERPL-SCNC: 4.7 MMOL/L (ref 3.5–5.1)
PROT SERPL-MCNC: 6.7 G/DL (ref 6–8.4)
RBC # BLD AUTO: 4.67 M/UL (ref 4–5.4)
SARS-COV-2 RDRP RESP QL NAA+PROBE: NEGATIVE
SODIUM SERPL-SCNC: 129 MMOL/L (ref 136–145)
TROPONIN I SERPL DL<=0.01 NG/ML-MCNC: 0.01 NG/ML (ref 0–0.03)
TROPONIN I SERPL DL<=0.01 NG/ML-MCNC: 0.01 NG/ML (ref 0–0.03)
TROPONIN I SERPL DL<=0.01 NG/ML-MCNC: <0.006 NG/ML (ref 0–0.03)
WBC # BLD AUTO: 17.56 K/UL (ref 3.9–12.7)

## 2020-10-29 PROCEDURE — 25000003 PHARM REV CODE 250: Performed by: STUDENT IN AN ORGANIZED HEALTH CARE EDUCATION/TRAINING PROGRAM

## 2020-10-29 PROCEDURE — 94640 AIRWAY INHALATION TREATMENT: CPT

## 2020-10-29 PROCEDURE — U0002 COVID-19 LAB TEST NON-CDC: HCPCS

## 2020-10-29 PROCEDURE — 99285 EMERGENCY DEPT VISIT HI MDM: CPT | Mod: 25

## 2020-10-29 PROCEDURE — 93005 ELECTROCARDIOGRAM TRACING: CPT

## 2020-10-29 PROCEDURE — 80053 COMPREHEN METABOLIC PANEL: CPT

## 2020-10-29 PROCEDURE — 84484 ASSAY OF TROPONIN QUANT: CPT | Mod: 91

## 2020-10-29 PROCEDURE — 36415 COLL VENOUS BLD VENIPUNCTURE: CPT

## 2020-10-29 PROCEDURE — 85025 COMPLETE CBC W/AUTO DIFF WBC: CPT

## 2020-10-29 PROCEDURE — G0378 HOSPITAL OBSERVATION PER HR: HCPCS

## 2020-10-29 PROCEDURE — 25000003 PHARM REV CODE 250: Performed by: PHYSICIAN ASSISTANT

## 2020-10-29 PROCEDURE — 25000242 PHARM REV CODE 250 ALT 637 W/ HCPCS: Performed by: STUDENT IN AN ORGANIZED HEALTH CARE EDUCATION/TRAINING PROGRAM

## 2020-10-29 PROCEDURE — 83880 ASSAY OF NATRIURETIC PEPTIDE: CPT

## 2020-10-29 PROCEDURE — 96361 HYDRATE IV INFUSION ADD-ON: CPT

## 2020-10-29 PROCEDURE — 84484 ASSAY OF TROPONIN QUANT: CPT

## 2020-10-29 RX ORDER — CLOPIDOGREL BISULFATE 75 MG/1
75 TABLET ORAL DAILY
Status: DISCONTINUED | OUTPATIENT
Start: 2020-10-30 | End: 2020-10-30 | Stop reason: HOSPADM

## 2020-10-29 RX ORDER — SODIUM CHLORIDE 0.9 % (FLUSH) 0.9 %
10 SYRINGE (ML) INJECTION
Status: DISCONTINUED | OUTPATIENT
Start: 2020-10-29 | End: 2020-10-30 | Stop reason: HOSPADM

## 2020-10-29 RX ORDER — POLYETHYLENE GLYCOL 3350 17 G/17G
17 POWDER, FOR SOLUTION ORAL DAILY
Status: DISCONTINUED | OUTPATIENT
Start: 2020-10-30 | End: 2020-10-30 | Stop reason: HOSPADM

## 2020-10-29 RX ORDER — FLUTICASONE PROPIONATE 50 MCG
1 SPRAY, SUSPENSION (ML) NASAL EVERY MORNING
Status: DISCONTINUED | OUTPATIENT
Start: 2020-10-30 | End: 2020-10-30 | Stop reason: HOSPADM

## 2020-10-29 RX ORDER — DULOXETIN HYDROCHLORIDE 20 MG/1
20 CAPSULE, DELAYED RELEASE ORAL DAILY
Status: DISCONTINUED | OUTPATIENT
Start: 2020-10-30 | End: 2020-10-30 | Stop reason: HOSPADM

## 2020-10-29 RX ORDER — OXYBUTYNIN CHLORIDE 5 MG/1
5 TABLET, EXTENDED RELEASE ORAL EVERY OTHER DAY
Status: DISCONTINUED | OUTPATIENT
Start: 2020-10-30 | End: 2020-10-30 | Stop reason: HOSPADM

## 2020-10-29 RX ORDER — INSULIN ASPART 100 [IU]/ML
0-5 INJECTION, SOLUTION INTRAVENOUS; SUBCUTANEOUS
Status: DISCONTINUED | OUTPATIENT
Start: 2020-10-29 | End: 2020-10-30 | Stop reason: HOSPADM

## 2020-10-29 RX ORDER — IPRATROPIUM BROMIDE AND ALBUTEROL SULFATE 2.5; .5 MG/3ML; MG/3ML
3 SOLUTION RESPIRATORY (INHALATION) EVERY 4 HOURS PRN
Status: DISCONTINUED | OUTPATIENT
Start: 2020-10-29 | End: 2020-10-30 | Stop reason: HOSPADM

## 2020-10-29 RX ORDER — DILTIAZEM HYDROCHLORIDE 180 MG/1
360 CAPSULE, COATED, EXTENDED RELEASE ORAL DAILY
Status: DISCONTINUED | OUTPATIENT
Start: 2020-10-30 | End: 2020-10-30 | Stop reason: HOSPADM

## 2020-10-29 RX ORDER — PANTOPRAZOLE SODIUM 40 MG/1
40 TABLET, DELAYED RELEASE ORAL DAILY
Status: DISCONTINUED | OUTPATIENT
Start: 2020-10-29 | End: 2020-10-30 | Stop reason: HOSPADM

## 2020-10-29 RX ORDER — GLUCAGON 1 MG
1 KIT INJECTION
Status: DISCONTINUED | OUTPATIENT
Start: 2020-10-29 | End: 2020-10-30 | Stop reason: HOSPADM

## 2020-10-29 RX ORDER — IBUPROFEN 200 MG
24 TABLET ORAL
Status: DISCONTINUED | OUTPATIENT
Start: 2020-10-29 | End: 2020-10-30 | Stop reason: HOSPADM

## 2020-10-29 RX ORDER — ASCORBIC ACID 500 MG
1000 TABLET ORAL DAILY
Status: DISCONTINUED | OUTPATIENT
Start: 2020-10-30 | End: 2020-10-30 | Stop reason: HOSPADM

## 2020-10-29 RX ORDER — AZELASTINE 1 MG/ML
1 SPRAY, METERED NASAL 2 TIMES DAILY
Status: DISCONTINUED | OUTPATIENT
Start: 2020-10-29 | End: 2020-10-30 | Stop reason: HOSPADM

## 2020-10-29 RX ORDER — ATORVASTATIN CALCIUM 10 MG/1
10 TABLET, FILM COATED ORAL DAILY
Status: DISCONTINUED | OUTPATIENT
Start: 2020-10-30 | End: 2020-10-30 | Stop reason: HOSPADM

## 2020-10-29 RX ORDER — IBUPROFEN 200 MG
16 TABLET ORAL
Status: DISCONTINUED | OUTPATIENT
Start: 2020-10-29 | End: 2020-10-30 | Stop reason: HOSPADM

## 2020-10-29 RX ORDER — FUROSEMIDE 40 MG/1
40 TABLET ORAL DAILY
Status: DISCONTINUED | OUTPATIENT
Start: 2020-10-30 | End: 2020-10-30 | Stop reason: HOSPADM

## 2020-10-29 RX ORDER — PNV NO.95/FERROUS FUM/FOLIC AC 28MG-0.8MG
100 TABLET ORAL DAILY
Status: DISCONTINUED | OUTPATIENT
Start: 2020-10-29 | End: 2020-10-30 | Stop reason: HOSPADM

## 2020-10-29 RX ORDER — BISACODYL 5 MG
5 TABLET, DELAYED RELEASE (ENTERIC COATED) ORAL DAILY PRN
Status: DISCONTINUED | OUTPATIENT
Start: 2020-10-29 | End: 2020-10-30 | Stop reason: HOSPADM

## 2020-10-29 RX ORDER — ISOSORBIDE MONONITRATE 30 MG/1
30 TABLET, EXTENDED RELEASE ORAL DAILY
Status: DISCONTINUED | OUTPATIENT
Start: 2020-10-30 | End: 2020-10-30 | Stop reason: HOSPADM

## 2020-10-29 RX ORDER — GABAPENTIN 300 MG/1
600 CAPSULE ORAL 3 TIMES DAILY
Status: DISCONTINUED | OUTPATIENT
Start: 2020-10-29 | End: 2020-10-30 | Stop reason: HOSPADM

## 2020-10-29 RX ORDER — ONDANSETRON 4 MG/1
4 TABLET, ORALLY DISINTEGRATING ORAL EVERY 8 HOURS PRN
Status: DISCONTINUED | OUTPATIENT
Start: 2020-10-29 | End: 2020-10-30 | Stop reason: HOSPADM

## 2020-10-29 RX ORDER — LISINOPRIL 20 MG/1
20 TABLET ORAL DAILY
Status: DISCONTINUED | OUTPATIENT
Start: 2020-10-30 | End: 2020-10-30

## 2020-10-29 RX ADMIN — IPRATROPIUM BROMIDE AND ALBUTEROL SULFATE 3 ML: .5; 3 SOLUTION RESPIRATORY (INHALATION) at 09:10

## 2020-10-29 RX ADMIN — GABAPENTIN 600 MG: 300 CAPSULE ORAL at 10:10

## 2020-10-29 RX ADMIN — AZELASTINE HYDROCHLORIDE 137 MCG: 137 SPRAY, METERED NASAL at 11:10

## 2020-10-29 RX ADMIN — SODIUM CHLORIDE 1000 ML: 0.9 INJECTION, SOLUTION INTRAVENOUS at 11:10

## 2020-10-29 RX ADMIN — PANTOPRAZOLE SODIUM 40 MG: 40 TABLET, DELAYED RELEASE ORAL at 03:10

## 2020-10-29 RX ADMIN — APIXABAN 5 MG: 5 TABLET, FILM COATED ORAL at 10:10

## 2020-10-29 RX ADMIN — GABAPENTIN 600 MG: 300 CAPSULE ORAL at 03:10

## 2020-10-29 RX ADMIN — VITAM B12 100 MCG: 100 TAB at 06:10

## 2020-10-29 NOTE — ED NOTES
Pt is alert and oriented. C/O palpitations that started at 0845 this morning. Pt states at that time she had midsternal chest pain with numbness and tingling in all of her extremities that lasted approximately 20 minutes and resolved itself. Pt states her chest feels tight and heavy at this time without sob, dizziness, n/v. ED tech at bedside for 12 lead.  at bedside.

## 2020-10-29 NOTE — ED NOTES
Pt assisted onto bedpan for urination, provided her with sanitary wipes. Disposed of urine in hopper. Call bell in reach and  at bedside.

## 2020-10-29 NOTE — ED PROVIDER NOTES
Encounter Date: 10/29/2020       History     Chief Complaint   Patient presents with    Palpitations     onset of palpitations and sob at 0930 this am. recently admitted with A fib with RVR.      Adriana Strong, a 75 y.o. female  has a past medical history of Acute on chronic diastolic (congestive) heart failure (11/20/2019), Anticoagulant long-term use, Anxiety, Arthritis, Asthma, Atrial fibrillation, Cataracts, bilateral, Chest pain, atypical, Colon polyp, Coronary artery disease, Diabetes mellitus, Dry eyes, Esophageal erosions, GERD (gastroesophageal reflux disease), Heart murmur, Hemorrhoid, High cholesterol, Hypertension, Irritable bowel syndrome, Shingles (2015), Stenosis of aortic and mitral valves, Stroke, TIA (transient ischemic attack), and Use of cane as ambulatory aid.     She presents to the ED evaluation of the palpitations that started about 930 this morning.  Patient states she was recently admitted for AFib with RVR.  She has been taking her medicines prescribed.  States that this chest discomfort is midsternal.  No alleviating or exacerbating factors have been identified.  She denies any symptoms of nausea, vomiting or shortness of breath.      The history is provided by the patient.     Review of patient's allergies indicates:   Allergen Reactions    Celebrex [celecoxib] Shortness Of Breath    Ciprofloxacin Swelling     lip    Dicyclomine Hives    Adhesive Dermatitis    Avelox [moxifloxacin] Swelling    Cilostazol Other (See Comments)     Elevates blood pressure    Eryc [erythromycin] Other (See Comments)     unknown    Iodine and iodide containing products Hives    Keflex [cephalexin] Hives    Meclomen      rashes    Meloxicam      Ear ringing    Metoclopramide Other (See Comments)     High blood pressure    Sulfa (sulfonamide antibiotics) Itching     Past Medical History:   Diagnosis Date    Acute on chronic diastolic (congestive) heart failure 11/20/2019    Anticoagulant  long-term use     on Plavix since May 2015    Anxiety     Arthritis     Asthma     Atrial fibrillation     Cataracts, bilateral     Chest pain, atypical     Colon polyp     Coronary artery disease     Diabetes mellitus     Dry eyes     Esophageal erosions     GERD (gastroesophageal reflux disease)     Heart murmur     Hemorrhoid     High cholesterol     Hypertension     Irritable bowel syndrome     Shingles 2015    Stenosis of aortic and mitral valves     Stroke     TIA (transient ischemic attack)     Use of cane as ambulatory aid      Past Surgical History:   Procedure Laterality Date    ABDOMINAL SURGERY      stents to SMA    angiocele      2007, 2014    AORTIC VALVE REPLACEMENT N/A 11/19/2019    Procedure: Replacement-valve-aortic;  Surgeon: Jack Capone MD;  Location: [facility] CATH LAB;  Service: Cardiology;  Laterality: N/A;    BREAST SURGERY      left--- a lump--- no cancer    CARDIAC VALVE SURGERY      COLONOSCOPY  2014    COLONOSCOPY N/A 3/14/2018    Procedure: COLONOSCOPY;  Surgeon: Efren Kemp MD;  Location: [facility] ENDO ([address] [place] [address]);  Service: Endoscopy;  Laterality: N/A;  ok to hold Plavix 5 days prior to procedure per Dr RESHMA Hernandez     ok per Dr Kemp to hold Eliquis 2 days prior to procedure (see telephone encounter dated-2/7/18)    HERNIA REPAIR      HYSTERECTOMY  partial    1982 partial hysterectomy    LEFT HEART CATHETERIZATION Right 11/5/2019    Procedure: Left heart cath;  Surgeon: Jack Capone MD;  Location: [facility] CATH LAB;  Service: Cardiology;  Laterality: Right;    left nasal polyp      left neck lymph node      nose polyp      right hip fatty mass tissue      stent to small intestine      SUPERIOR VENA CAVA ANGIOPLASTY / STENTING      TONSILLECTOMY      TOTAL ABDOMINAL HYSTERECTOMY      2014    TOTAL KNEE ARTHROPLASTY Bilateral     TREATMENT OF CARDIAC ARRHYTHMIA N/A 2/10/2020    Procedure: CARDIOVERSION;  Surgeon: Jayy Parrish MD;  Location: [facility] EP LAB;   Service: Cardiology;  Laterality: N/A;  AF, PEDRO, DCCV, MAC, DM, 3 Prep    TREATMENT OF CARDIAC ARRHYTHMIA N/A 5/15/2020    Procedure: CARDIOVERSION;  Surgeon: Hany Bee MD;  Location: [facility] EP LAB;  Service: Cardiology;  Laterality: N/A;  AF, PEDRO, DCCV, MAC, DM, 3 Prep    UPPER GASTROINTESTINAL ENDOSCOPY  2014     Family History   Problem Relation Age of Onset    Heart attack Mother     Stroke Father     Heart failure Brother     Colon cancer Neg Hx     Inflammatory bowel disease Neg Hx      Social History     Tobacco Use    Smoking status: Never Smoker    Smokeless tobacco: Never Used   Substance Use Topics    Alcohol use: No    Drug use: No     Review of Systems   Constitutional: Negative for fever.   Respiratory: Negative for shortness of breath.    Cardiovascular: Positive for chest pain. Negative for palpitations and leg swelling.   Gastrointestinal: Negative for nausea and vomiting.   Skin: Negative for color change and rash.   Neurological: Negative for weakness.   Psychiatric/Behavioral: Negative for agitation and confusion.   All other systems reviewed and are negative.      Physical Exam     Initial Vitals   BP Pulse Resp Temp SpO2   -- -- -- -- --      MAP       --         Physical Exam    Nursing note and vitals reviewed.  Constitutional: She appears well-developed and well-nourished. She is not diaphoretic. No distress.   HENT:   Head: Normocephalic and atraumatic.   Right Ear: External ear normal.   Left Ear: External ear normal.   Nose: Nose normal.   Eyes: Conjunctivae and EOM are normal.   Neck: Normal range of motion.   Cardiovascular: Normal rate. An irregularly irregular rhythm present.    Pulmonary/Chest: Breath sounds normal. No respiratory distress. She has no wheezes. She has no rhonchi. She has no rales. She exhibits no tenderness.   Abdominal: Soft. Bowel sounds are normal. She exhibits no distension. There is no abdominal tenderness. There is no rebound and no guarding.    Musculoskeletal: Normal range of motion.   Neurological: She is alert and oriented to person, place, and time.   Skin: Skin is warm and dry. Capillary refill takes less than 2 seconds. No rash noted. No erythema.   Psychiatric: She has a normal mood and affect. Thought content normal.         ED Course   Procedures  Labs Reviewed   CBC W/ AUTO DIFFERENTIAL - Abnormal; Notable for the following components:       Result Value    WBC 17.56 (*)     MCH 31.3 (*)     Immature Granulocytes 2.6 (*)     Gran # (ANC) 15.8 (*)     Immature Grans (Abs) 0.45 (*)     Lymph # 0.9 (*)     Gran % 89.8 (*)     Lymph % 5.2 (*)     Mono % 2.2 (*)     All other components within normal limits   COMPREHENSIVE METABOLIC PANEL - Abnormal; Notable for the following components:    Sodium 129 (*)     Chloride 90 (*)     Glucose 305 (*)     Alkaline Phosphatase 40 (*)     ALT 58 (*)     eGFR if non  54 (*)     All other components within normal limits   TROPONIN I   B-TYPE NATRIURETIC PEPTIDE   SARS-COV-2 RNA AMPLIFICATION, QUAL        ECG Results          EKG 12-lead (In process)  Result time 10/29/20 11:20:31    In process by Interface, Lab In [facility] (10/29/20 11:20:31)                 Narrative:    Test Reason : R00.2,    Vent. Rate : 103 BPM     Atrial Rate : 098 BPM     P-R Int : 000 ms          QRS Dur : 088 ms      QT Int : 332 ms       P-R-T Axes : 000 008 -36 degrees     QTc Int : 434 ms    Atrial fibrillation with rapid ventricular response  Nonspecific ST and T wave abnormality  Abnormal ECG  When compared with ECG of 21-OCT-2020 09:22,  Atrial fibrillation has replaced Sinus rhythm  T wave inversion no longer evident in Lateral leads    Referred By: [email or web address]   SELF           Confirmed By:                             Imaging Results          X-Ray Chest AP Portable (Final result)  Result time 10/29/20 10:56:07    Final result by Edilberto Cook MD (10/29/20 10:56:07)                 Impression:      No acute  "finding or detrimental change when compared with 10/20/2020.      Electronically signed by: Edilberto Cook MD  Date:    10/29/2020  Time:    10:56             Narrative:    EXAMINATION:  XR CHEST AP PORTABLE    CLINICAL HISTORY:  Provided history is "  Palpitations".    TECHNIQUE:  One view of the chest.    COMPARISON:  10/20/2020.    FINDINGS:  Cardiac wires overlie the chest.  Right hemidiaphragm is elevated.  Cardiomediastinal silhouette is enlarged but stable.  Atherosclerotic calcifications overlie the aortic arch.  Postoperative changes of TAVR.  No focal consolidation.  No sizable pleural effusion.  No pneumothorax.  No detrimental change when compared with the prior study.                                 Medical Decision Making:   Initial Assessment:   Chest )tightness, palpitations   Differential Diagnosis:   ACS, afib with RVR, electrolyte abnormality   Clinical Tests:   Lab Tests: Ordered and Reviewed  The following lab test(s) were unremarkable: CBC, CMP, BNP and Troponin  Radiological Study: Ordered and Reviewed  ED Management:  Patient presents to the ER for evaluation sensation of palpitations as well as midsternal chest discomfort.  Patient's EKG showed AFib with RVR at a normal rate.  No acute findings on chest x-ray, CBC, CMP, troponin.  Hyponatremia noted on CMP.  Replete with fluids.  COVID was negative.  Patient will be admitted for further evaluation of her chest discomfort and arrhythmia.                             Clinical Impression:       ICD-10-CM ICD-9-CM   1. Chest pain  R07.9 786.50   2. Palpitations  R00.2 785.1   3. Atrial fibrillation with rapid ventricular response  I48.91 427.31   4. Atrial fibrillation  I48.91 427.31   5. Hyponatremia  E87.1 276.1                                               Lucretia Pimentel PA-C  10/29/20 1613       Lucretia Pimentel PA-C  10/29/20 1641    "

## 2020-10-29 NOTE — PLAN OF CARE
[facility] Medicine Hospitalist Service H&P Plan of Care Note    Subjective:  HPI:  Ms Roger a 75 year old female with Atrial fibirllation, severe aortic stensois with TAVR, CAD, T2DM, chronic mesenteric ischemia with stents, GERD, HLD, HTN, TIA, obesity, asthma with hx of chronic steroid use who presented to [facility] on 10/29/2020 with complaint of chest pain for 2 days. Episode yesterday last one minute, at rest and stated it was heavy and tight and resolved on its own, but notes there may be association with coughing but unable to provide adequate history. This morning had episode at 8:45 lasting 20 minutes where he had heavy chest pain and palpitations while at rest. She did drink coffee this morning. After 20 minutes of standing she got up and fell like her legs were weak and burning. Currently complaining of some mild shortness of breath and she states she is still having discomfort in chest area.    PMH, surgical, family, social, allergies, HCM reviewed and will be documented in official H&P    PCP: KENNETH Mcgraw      Objective:  Vitals:  -Hypertensive. Rate controlled A fib. Respiration in low 20s. On room air    Pertinent Physical Exam Findings:  -NAD. No accessory respiratory muscles being use. Heart with systolic murmur. Lungs with minimal wheeze. Lesage hump on back. Abdomen mildly tender in epigastric region. No peripheral edema.    Pertinent Laboratory Findings:  -Leukocytosis (on steroids since Monday), Corrected sodium to 134. Glucose 305. Troponin 0.008. BNP 59    Pertinent Imaging:  -CXR with no acute findings, no edema    EKG (my interpretation):  -A fib rate controlled in 90s. Axis about 0. Non-specific ST changes    Assessment:  Ms Roger a 75 year old female with Atrial fibirllation, severe aortic stensois with TAVR, CAD, T2DM, chronic mesenteric ischemia with stents, GERD, HLD, HTN, TIA, obesity, asthma with hx of chronic steroid use who presented to [facility] on 10/29/2020 with  complaint of chest pain for 2 days. Admitted to [facility] medicine for ACS rule out.    Plan:  Chest/ACS rule out in setting of known CAD  -Presents with intermittent chest pain, nonexertional, possibly associated with cough, heavy, non-radiating for 2 days  -[facility] November 2019 with 70% stenosis in LAD  -Initial troponin 0.008, will trend  -Initial EKG with non-specific ST changes, will trend  -On Plavix and statin  -Continue to trend EKG and troponins overnight, was due for stress test on 11/19/20 and will evaluate further if warranted tomorrow    Chronic AFib  -Continue home Diltiazem 360 mg daily  -Continue Eliquis 5 mg BID  - Follows with Dr. Bee and Dr. Carrington   - Patient has appointment scheduled with Dr. Carrington on 12/08/20  -Needs caffeine cessation      Severe aortic stenosis s/p TAVR  -History of aortic stenosis with TAVR performed on 04/19  -Echo in May of 2020 with no aortic insufficiency  -Stress echo scheduled for 11/19/20  -Follows with Dr. Bee and Dr. Carrington  -Patient has appointment scheduled with Dr. Carrington on 12/08/20    Chronic mesenteric vascular occlusive disease s/p SMA angioplasty with stent placement  - Continue home Plavix 75 mg     Leukocytosis in the setting of recent steroid use  -Patient saw pulmonologist 4 days ago and given 5 day course  -No fever, chills, sputum production, or other signs and symptoms of infection  -Stopping prednisone today    Type 2 diabetes mellitus  -On metformin 500 mg BID  will hold while inpatient  -A1c on 10/20/20 5.9  -Sliding scale insulin given recent prednisone usage     Essential Hypertension  -Patient currently on mdur 30 mg, lisinopril 20 mg at home (HCTZ 12.5 stopped on last admission given contraction alkalosis)  Multiple home Bps last week in 140s/90s, will need follow up with PCP to see if HCTZ should be resume when off steroids  -Imdur 30 mg and lisinopril 20 mg continued    Chronic Asthma  -Recent admission for asthma exacerbation, only  minimal expiratory wheezes  -PRN duonebs while inpatient  -Will resume home chronic inhalers on discharge     GERD  - Continue Protonix 40 mg daily     Chronic constipation  -Continue Dulcolax 5 mg and polyethylene glycol 17 mg     HLD  -Continue atorvastatin 10 mg     Anxiety  -Continue duloxetine 20 mg     Chronic back pain  -Continue gabapentin 600 mg TID      Possible RADHA  -Patient reports some symptoms of RAHDA in the setting of multiple risk factors and pulmonary HTN  -Will need f/u with Pulmonary on discharge and outpatient PSG    Seasonal Allergies  -Flonase    Overactive Bladder  -Continue home oxybutinin 5 mg daily    Nutrition: Cardiac, NPO midnight  DVT Ppx: Eliquis  Telemetry: Yes  Sims: No  Code: Full    Dispo: Observation, trend troponins and EKGs    This is not official H&P. Patient to be staffed with attending.    Sagar Barriga DO  [facility] HO-3  [facility] Team A: [id number]/[facility] Team B: [id number]  10/29/2020

## 2020-10-29 NOTE — H&P
"[facility] Medicine H&P Note     Admitting Team: [facility] Hospitalist Team A  Attending Physician: Zunilda Marinelli MD  Resident: Nithya  Intern: Tameka     Date of Admit: 10/29/2020    Chief Complaint     Palpitations for 2 days    Subjective:      History of Present Illness:  Adriana Strong is a 75 y.o.  female who  has a past medical history of Acute on chronic diastolic (congestive) heart failure (11/20/2019), Anticoagulant long-term use, Anxiety, Arthritis, Asthma, Atrial fibrillation, Cataracts, bilateral, Chest pain, atypical, Colon polyp, Coronary artery disease, Diabetes mellitus, Dry eyes, Esophageal erosions, GERD (gastroesophageal reflux disease), Heart murmur, Hemorrhoid, High cholesterol, Hypertension, Irritable bowel syndrome, Shingles (2015), Stenosis of aortic and mitral valves, Stroke, TIA (transient ischemic attack), and Use of cane as ambulatory aid.    The patient was in their usual state of health until yesterday morning when she had a one minute, episode of her "afib acting up" at rest and stated it her chest was also heavy and tight and resolved on its own, but notes there may be association with coughing but unable to provide adequate history. This morning had a second episode at 8:45AM lasting 20 minutes where she had heavy chest pain and palpitations while at rest. She did drink one cup of coffee this morning. After 20 minutes of standing she got up and felt like her legs were weak and burning. Currently complaining of some mild shortness of breath and she states she is still having discomfort in chest area. No other exacerbating or relieving factors. Recently admitted to [facility] for acute on chronic asthma exacerbation. She endorses taking all her medications this morning.    Past Medical History:  Past Medical History:   Diagnosis Date    Acute on chronic diastolic (congestive) heart failure 11/20/2019    Anticoagulant long-term use     on Plavix since May 2015    Anxiety     " Arthritis     Asthma     Atrial fibrillation     Cataracts, bilateral     Chest pain, atypical     Colon polyp     Coronary artery disease     Diabetes mellitus     Dry eyes     Esophageal erosions     GERD (gastroesophageal reflux disease)     Heart murmur     Hemorrhoid     High cholesterol     Hypertension     Irritable bowel syndrome     Shingles 2015    Stenosis of aortic and mitral valves     Stroke     TIA (transient ischemic attack)     Use of cane as ambulatory aid        Past Surgical History:  Past Surgical History:   Procedure Laterality Date    ABDOMINAL SURGERY      stents to SMA    angiocele      2007, 2014    AORTIC VALVE REPLACEMENT N/A 11/19/2019    Procedure: Replacement-valve-aortic;  Surgeon: Jack Capone MD;  Location: [facility] CATH LAB;  Service: Cardiology;  Laterality: N/A;    BREAST SURGERY      left--- a lump--- no cancer    CARDIAC VALVE SURGERY      COLONOSCOPY  2014    COLONOSCOPY N/A 3/14/2018    Procedure: COLONOSCOPY;  Surgeon: Efren Kemp MD;  Location: [facility] ENDO ([address] [place] [address]);  Service: Endoscopy;  Laterality: N/A;  ok to hold Plavix 5 days prior to procedure per Dr RESHMA Hernandez     ok per Dr Kemp to hold Eliquis 2 days prior to procedure (see telephone encounter dated-2/7/18)    HERNIA REPAIR      HYSTERECTOMY  partial    1982 partial hysterectomy    LEFT HEART CATHETERIZATION Right 11/5/2019    Procedure: Left heart cath;  Surgeon: Jack Capone MD;  Location: [facility] CATH LAB;  Service: Cardiology;  Laterality: Right;    left nasal polyp      left neck lymph node      nose polyp      right hip fatty mass tissue      stent to small intestine      SUPERIOR VENA CAVA ANGIOPLASTY / STENTING      TONSILLECTOMY      TOTAL ABDOMINAL HYSTERECTOMY      2014    TOTAL KNEE ARTHROPLASTY Bilateral     TREATMENT OF CARDIAC ARRHYTHMIA N/A 2/10/2020    Procedure: CARDIOVERSION;  Surgeon: Jayy Parrish MD;  Location: [facility] EP LAB;  Service: Cardiology;   Laterality: N/A;  AF, PEDRO, DCCV, MAC, DM, 3 Prep    TREATMENT OF CARDIAC ARRHYTHMIA N/A 5/15/2020    Procedure: CARDIOVERSION;  Surgeon: Hany Bee MD;  Location: [facility] LAB;  Service: Cardiology;  Laterality: N/A;  AF, PEDRO, DCCV, MAC, DM, 3 Prep    UPPER GASTROINTESTINAL ENDOSCOPY  2014       Allergies:  Review of patient's allergies indicates:   Allergen Reactions    Celebrex [celecoxib] Shortness Of Breath    Ciprofloxacin Swelling     lip    Dicyclomine Hives    Adhesive Dermatitis    Avelox [moxifloxacin] Swelling    Cilostazol Other (See Comments)     Elevates blood pressure    Eryc [erythromycin] Other (See Comments)     unknown    Iodine and iodide containing products Hives    Keflex [cephalexin] Hives    Meclomen      rashes    Meloxicam      Ear ringing    Metoclopramide Other (See Comments)     High blood pressure    Sulfa (sulfonamide antibiotics) Itching       Home Medications:  Prior to Admission medications    Medication Sig Start Date End Date Taking? Authorizing Provider   acetaminophen (TYLENOL ARTHRITIS) 650 MG TbSR Take 1,300 mg by mouth 2 (two) times daily.    Historical Provider   albuterol (ACCUNEB) 1.25 mg/3 mL Nebu Take scheduled q4 for the next two days while at home then resume prn dosing 1/6/17   Kalyn Berman MD   albuterol (VENTOLIN HFA) 90 mcg/actuation inhaler Inhale 2 puffs into the lungs every 6 (six) hours as needed for Wheezing. Rescue    Historical Provider   apixaban (ELIQUIS) 5 mg Tab Take 1 tablet (5 mg total) by mouth 2 (two) times daily. 7/23/20   Jr Carrington MD   ascorbic acid (VITAMIN C) 500 MG tablet Take 1,000 mg by mouth once daily.     Historical Provider   atorvastatin (LIPITOR) 10 MG tablet Take 1 tablet (10 mg total) by mouth once daily. 7/23/20 7/23/21  Jr Carrington MD   azelastine (ASTELIN) 137 mcg nasal spray 1 spray by Nasal route 2 (two) times daily.    Historical Provider   bisacodyl (DULCOLAX) 5 mg EC tablet Take 5 mg by  mouth daily as needed for Constipation.    Historical Provider   budesonide-formoterol 160-4.5 mcg (SYMBICORT) 160-4.5 mcg/actuation HFAA Inhale 2 puffs into the lungs every 12 (twelve) hours. Controller    Historical Provider   calcium carbonate (OS-MICHAEL) 600 mg (1,500 mg) Tab Take 1,200 mg by mouth once daily.    Historical Provider   calcium carbonate/vitamin D3 (CALTRATE 600 + D ORAL) Take by mouth.    Historical Provider   clopidogrel (PLAVIX) 75 mg tablet Take 1 tablet (75 mg total) by mouth once daily. 1/13/20 1/7/21  Lucretia Stark MD   cyanocobalamin (VITAMIN B-12) 1000 MCG tablet Take 100 mcg by mouth once daily.    Historical Provider   diltiaZEM (TIAZAC) 360 MG Cs24 Take 1 capsule (360 mg total) by mouth once daily. 10/22/20 10/22/21  Ishaan Zamorano MD   DULoxetine (CYMBALTA) 20 MG capsule TAKE 1 CAPSULE BY MOUTH EVERY DAY 7/10/20   Sean Saravia MD   fexofenadine (ALLEGRA) 60 MG tablet Take 180 mg by mouth every morning.     Historical Provider   fish oil-omega-3 fatty acids 300-1,000 mg capsule Take 1 g by mouth once daily.     Historical Provider   fluticasone (FLONASE) 50 mcg/actuation nasal spray 1 spray by Each Nare route every morning.     Historical Provider   folic acid/multivit-min/lutein (CENTRUM SILVER ORAL) Take by mouth.    Historical Provider   furosemide (LASIX) 40 MG tablet TAKE 1 TABLET BY MOUTH EVERY DAY 1/14/20   Jr Carrington MD   gabapentin (NEURONTIN) 300 MG capsule Take 2 capsules (600 mg total) by mouth 3 (three) times daily. NO FURTHER REFILL WITHOUT APT 10/25/20   Sean Saravia MD   guaiFENesin (HUMIBID E) 400 mg Tab Take 1.5 tablets (600 mg total) by mouth 2 (two) times daily. 10/21/20   Ishaan Zamorano MD   isosorbide mononitrate (IMDUR) 30 MG 24 hr tablet Take 1 tablet (30 mg total) by mouth once daily. 10/1/20 10/1/21  Jr Carrington MD   lisinopriL (PRINIVIL,ZESTRIL) 20 MG tablet Take 1 tablet (20 mg total) by mouth once daily. 9/17/20    Jr Carrington MD   lorazepam (ATIVAN) 0.5 MG tablet Take 0.5 mg by mouth every morning.     Historical Provider   metFORMIN (GLUCOPHAGE) 500 MG tablet Take 500 mg by mouth 2 (two) times daily. 7/16/19   Historical Provider   nitrofurantoin, macrocrystal-monohydrate, (MACROBID) 100 MG capsule Take 1 capsule (100 mg total) by mouth 2 (two) times a day. For UTI 10/21/20   Ishaan Zamorano MD   nitroGLYCERIN (NITROSTAT) 0.4 MG SL tablet Place 1 tablet (0.4 mg total) under the tongue every 5 (five) minutes as needed for Chest pain.  Patient not taking: Reported on 10/22/2020 11/21/19 11/20/20  Hany Matthew MD   ondansetron (ZOFRAN-ODT) 4 MG TbDL DISSOLVE 1 TABLET ON TONGUE EVERY 8 HOURS AS NEEDED FOR NAUSEA 10/15/18   Historical Provider   oxybutynin (DITROPAN-XL) 5 MG TR24 Take 5 mg by mouth every other day.  10/19/15   Historical Provider   polyethylene glycol (GLYCOLAX) 17 gram PwPk Take by mouth.    Historical Provider   POLYSORBATE 80/GLYCERIN (REFRESH DRY EYE THERAPY OPHT) Apply to eye 2 (two) times daily.    Historical Provider   potassium gluconate 550 mg (90 mg) Tab Take by mouth. 2 in am and 2 pm    Historical Provider   vitamin D (VITAMIN D3) 1000 units Tab Take 2,000 Units by mouth once daily.    Historical Provider   zafirlukast (ACCOLATE) 20 MG tablet Take 20 mg by mouth once daily. 12/18/18   Historical Provider       Family History:  Family History   Problem Relation Age of Onset    Heart attack Mother     Stroke Father     Heart failure Brother     Colon cancer Neg Hx     Inflammatory bowel disease Neg Hx        Social History:  Social History     Tobacco Use    Smoking status: Never Smoker    Smokeless tobacco: Never Used   Substance Use Topics    Alcohol use: No    Drug use: No       Review of Systems:  CONSTITUTIONAL: Does not endorse fever, chills, night sweats, weight loss or fatigue  HEENT: Does not endorse blurry vision, loss of vision, hearing loss, sinus drainage,  or dysphagia  CARDIOVASCULAR: Does not endorse chest pain or palpitations, dyspnea on exertion, orthopnea, or edema  RESPIRATORY: Does not endorse shortness of breath, wheezing, coughing, or hemoptysis  GASTROINTESTINAL: Does not endorse abdominal pain, reflux, diarrhea or constipation  GENITOURINARY: Does not endorse dysuria, frequency, hesitancy, or hematuria  MUSCULOSKELETAL: Does not endorse stiffness, joint pain or muscle pain  SKIN: Does not endorse rashes, skin changes or itching  NEUROLOGIC: Does not endorse weakness, numbness, saddle anesthesia, bowel or bladder incontinence, or paresthesia  PSYCHIATRIC: Does not endorse mood changes, anxiety, depression, or memory changes  ENDOCRINE: Does not endorse heat or cold intolerance, excessive sweating, polyuria, polydipsia  HEMATOLOGIC: Does not endorse easy bleeding or bruising  All other systems reviewed and negative    Health Maintaince :   Primary Care Physician: KENNETH Mcgraw    Immunizations:   TDap UTP per patient    Flu UTP per patient   Pna UTP per patient     Cancer Screening:  PAP: UTP per patient  MMG: UTP per patient; could not find any record of mamogram  Colonoscopy: UTP 3/14/18     Objective:   Last 24 Hour Vital Signs:  BP  Min: 144/63  Max: 155/70  Pulse  Av  Min: 91  Max: 95  Resp  Av.5  Min: 21  Max: 22  SpO2  Av %  Min: 96 %  Max: 98 %  There is no height or weight on file to calculate BMI.  No intake/output data recorded.    Physical Examination:  General: Alert, in no acute distress  Head: Normocephalic, atraumatic  Eyes: EOMI, PERRL, conjunctiva normal  Throat: Normal mucosa  Neck: Supple, no carotid bruit bilaterally  Cardiovascular: RRR, S1/S2, 3/6 holosystolic murmur right upper sternal border  Pulmonary: minimal wheezing, unlabored breathing  Abdomen: mildly tender in epigastric region.     Soft, nondistended, active bowel sounds  Extremities: No cyanosis, clubbing, edema, or lesions  Pulses: 2+ in all  extremities  Neurologic: AOx3, CN II-XII grossly intact, normal strength, sensation throughout   [place] hump on back.     Laboratory:  Most Recent Data:  CBC:   Lab Results   Component Value Date    WBC 17.56 (H) 10/29/2020    HGB 14.6 10/29/2020    HCT 42.5 10/29/2020     10/29/2020    MCV 91 10/29/2020    RDW 14.2 10/29/2020     BMP:   Lab Results   Component Value Date     (L) 10/29/2020    K 4.7 10/29/2020    CL 90 (L) 10/29/2020    CO2 26 10/29/2020    BUN 21 10/29/2020    CREATININE 1.0 10/29/2020     (H) 10/29/2020    CALCIUM 8.8 10/29/2020    MG 2.0 10/19/2020    PHOS 2.9 06/13/2017     LFTs:   Lab Results   Component Value Date    PROT 6.7 10/29/2020    ALBUMIN 3.9 10/29/2020    BILITOT 0.6 10/29/2020    AST 28 10/29/2020    ALKPHOS 40 (L) 10/29/2020    ALT 58 (H) 10/29/2020     Coags:   Lab Results   Component Value Date    INR 1.0 10/19/2020     FLP:   Lab Results   Component Value Date    CHOL 161 08/25/2020    HDL 75 08/25/2020    LDLCALC 75.2 08/25/2020    TRIG 54 08/25/2020    CHOLHDL 46.6 08/25/2020     DM:   Lab Results   Component Value Date    HGBA1C 5.9 (H) 10/20/2020    HGBA1C 6.5 (H) 11/20/2019    HGBA1C 7.0 (H) 02/19/2019    LDLCALC 75.2 08/25/2020    CREATININE 1.0 10/29/2020     Thyroid:   Lab Results   Component Value Date    TSH 4.180 (H) 08/25/2020    FREET4 0.96 08/25/2020     Anemia:   Lab Results   Component Value Date    IRON 42 (L) 08/24/2016    TIBC 462 (H) 08/24/2016    FERRITIN 16 (L) 07/01/2016    [id number] 846 02/19/2019    FOLATE >24.0 08/24/2016     Cardiac:   Lab Results   Component Value Date    TROPONINI 0.008 10/29/2020    BNP 59 10/29/2020     Urinalysis:   Lab Results   Component Value Date    LABURIN KLEBSIELLA PNEUMONIAE  >100,000 cfu/ml   (A) 10/19/2020    COLORU Yellow 10/19/2020    COLORU Yellow 10/19/2020    SPECGRAV 1.015 10/19/2020    SPECGRAV 1.015 10/19/2020    NITRITE Negative 10/19/2020    NITRITE Negative 10/19/2020    KETONESU Negative  "10/19/2020    KETONESU Negative 10/19/2020    UROBILINOGEN Negative 10/19/2020    UROBILINOGEN Negative 10/19/2020    WBCUA 20 (H) 10/19/2020       Trended Lab Data:  Recent Labs   Lab 10/29/20  1050   WBC 17.56*   HGB 14.6   HCT 42.5      MCV 91   RDW 14.2   *   K 4.7   CL 90*   CO2 26   BUN 21   CREATININE 1.0   *   PROT 6.7   ALBUMIN 3.9   BILITOT 0.6   AST 28   ALKPHOS 40*   ALT 58*       Trended Cardiac Data:  Recent Labs   Lab 10/29/20  1050   TROPONINI 0.008   BNP 59       Microbiology Data:      Other Results:    EKG   A fib rate controlled in 90s. Axis ~0. Non-specific ST changes    Radiology:  Imaging Results          X-Ray Chest AP Portable (Final result)  Result time 10/29/20 10:56:07    Final result by Edilberto Cook MD (10/29/20 10:56:07)                 Impression:      No acute finding or detrimental change when compared with 10/20/2020.      Electronically signed by: Edilberto Cook MD  Date:    10/29/2020  Time:    10:56             Narrative:    EXAMINATION:  XR CHEST AP PORTABLE    CLINICAL HISTORY:  Provided history is "  Palpitations".    TECHNIQUE:  One view of the chest.    COMPARISON:  10/20/2020.    FINDINGS:  Cardiac wires overlie the chest.  Right hemidiaphragm is elevated.  Cardiomediastinal silhouette is enlarged but stable.  Atherosclerotic calcifications overlie the aortic arch.  Postoperative changes of TAVR.  No focal consolidation.  No sizable pleural effusion.  No pneumothorax.  No detrimental change when compared with the prior study.                               Assessment:     Adriana Strong is a 75 y.o. female with:  Patient Active Problem List    Diagnosis Date Noted    Comfort measures only status 10/21/2020    Acute cystitis without hematuria     Atrial fibrillation with RVR 10/19/2020    Leukemoid reaction 10/19/2020    Dysuria 10/19/2020    Hyponatremia 09/01/2020    Atrial flutter 05/15/2020    Persistent atrial fibrillation 02/10/2020 "    Vasovagal syncope 11/26/2019    TACOS (acute kidney injury) 11/26/2019    Headache 11/26/2019    Morbid obesity 11/20/2019    Acute on chronic diastolic (congestive) heart failure 11/20/2019    S/P TAVR (transcatheter aortic valve replacement) 11/19/2019    Lumbar radiculopathy 06/25/2019    Decreased range of motion of trunk and back 06/10/2019    Lumbar spondylosis 06/10/2019    Neuroforaminal stenosis of lumbar spine 06/10/2019    Spinal stenosis of lumbar region with neurogenic claudication 06/10/2019    Low back pain 06/06/2019    Difficulty walking 06/06/2019    Muscle weakness 06/06/2019    Balance problems 06/06/2019    DDD (degenerative disc disease), lumbar 05/14/2019    Cataract 05/14/2019    Diabetic polyneuropathy associated with type 2 diabetes mellitus 02/19/2019    Chronic atrial fibrillation with rapid ventricular response 06/23/2017    Costochondritis 06/23/2017    COPD (chronic obstructive pulmonary disease) 06/13/2017    Umbilical hernia without obstruction and without gangrene 05/09/2017    Incisional hernia, without obstruction or gangrene 05/09/2017    Severe aortic stenosis 02/03/2017    Constipation 01/04/2017    COPD exacerbation 01/02/2017    Old lacunar stroke without late effect 12/09/2016    Hyperlipidemia 06/17/2016    Essential hypertension 04/08/2016    Gastroesophageal reflux disease     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    Chronic mesenteric ischemia 04/16/2015    Enlarged ovary 11/24/2014    Controlled type 2 diabetes mellitus with diabetic polyneuropathy, without long-term current use of insulin 11/24/2014    Severe persistent asthma with acute exacerbation 11/24/2014       75 year old female with Atrial fibirllation, severe aortic stensois with TAVR, CAD, T2DM, chronic mesenteric ischemia with stents, GERD, HLD, HTN, TIA, obesity, asthma with hx of chronic steroid use who presented to [facility] on 10/29/2020 with complaint of  palpitations for 2 days. Admitted to [facility] medicine for ACS rule out.     Plan:       Chest/ACS rule out & in the setting of CAD  -Presents with intermittent chest pain, nonexertional, possibly associated with cough, heavy, non-radiating for 2 days  -[facility] November 2019 with 70% stenosis in LAD  -Initial troponin 0.008, will trend  -Initial EKG with non-specific ST changes, will trend  -On Plavix and statin  -Continue to trend EKG and troponins overnight, was due for stress test on 11/19/20 and will evaluate further if warranted      Paroxysmal AFib  -Continue home Diltiazem 360 mg daily  -Continue Eliquis 5 mg BID  - Follows with Dr. Bee and Dr. Carrington   - Patient has appointment scheduled with Dr. Carrington on 12/08/20  -Needs caffeine cessation      Severe aortic stenosis s/p TAVR  -History of aortic stenosis with TAVR performed on 04/19  -Echo in May of 2020 with no aortic insufficiency  -Stress echo scheduled for 11/19/20  -Follows with Dr. Bee and Dr. Carrington  -Patient has appointment scheduled with Dr. Carrington on 12/08/20     Chronic mesenteric vascular occlusive disease s/p SMA angioplasty with stent placement  - Continue home Plavix 75 mg      Leukocytosis in the setting of recent steroid use  -Patient saw pulmonologist 4 days ago and given 5 day course  -No fever, chills, sputum production, or other signs and symptoms of infection  -Stopped prednisone     Type 2 diabetes mellitus  -On metformin 500 mg BID  will hold while inpatient  -A1c on 10/20/20 5.9  -Sliding scale insulin given recent prednisone usage     Essential Hypertension  -Patient currently on mdur 30 mg, lisinopril 20 mg at home (HCTZ 12.5 stopped on last admission given contraction alkalosis)  Multiple home Bps last week in 140s/90s, will need follow up with PCP to see if HCTZ should be resume when off steroids  -Imdur 30 mg and lisinopril 20 mg continued     Chronic Asthma  -Recent admission for asthma exacerbation, only minimal expiratory  wheezes  -PRN duonebs while inpatient  -Will resume home chronic inhalers on discharge     GERD  - Continue Protonix 40 mg daily     Chronic constipation  -Continue Dulcolax 5 mg and polyethylene glycol 17 mg     HLD  -Continue atorvastatin 10 mg     Anxiety  -Continue duloxetine 20 mg     Chronic back pain  -Continue gabapentin 600 mg TID      Possible RADHA  -Patient reports some symptoms of RADHA in the setting of multiple risk factors and pulmonary HTN  -Will need f/u with Pulmonary on discharge and outpatient PSG     Seasonal Allergies  -Flonase     Overactive Bladder  -Continue home oxybutinin 5 mg daily     Nutrition: Cardiac, NPO midnight  DVT Ppx: Eliquis  Telemetry: Yes  Sims: No     Dispo: Observation, trend troponins and EKGs      Code Status:     Full    Hany English MD  [facility] Internal Medicine HO-I    [facility] Medicine Hospitalist Pager numbers:   [facility] Hospitalist Medicine Team A (Rajinder/Isis): [phone]  [facility] Hospitalist Medicine Team B (Antoni/Carleen):  [phone]

## 2020-10-29 NOTE — NURSING
Arrived from ED via stretcher. AAOx4,VSS, patient belongings, walking cane and WC at the bedside. will take home WC. Fall and allergy bands on. Yellow socks on. Bed in the lowest position,call bell within reach, bed alarm on.

## 2020-10-29 NOTE — PLAN OF CARE
Problem: Adult Inpatient Plan of Care  Goal: Plan of Care Review  Outcome: Ongoing, Progressing    VN cued into room to complete admit assessment, VIP model introduced, VN working alongside bedside treatment team.  Plan of care reviewed with patient. Patient verbalized understanding. Patient informed of fall risk and fall precautions, call light within reach, 2x bed rails. Patient notified to ask staff for assistance and pt verbalized complete understanding. Time allowed for questions. Will continue to monitor and intervene as needed.

## 2020-10-30 VITALS
TEMPERATURE: 99 F | SYSTOLIC BLOOD PRESSURE: 140 MMHG | DIASTOLIC BLOOD PRESSURE: 89 MMHG | WEIGHT: 234.13 LBS | RESPIRATION RATE: 20 BRPM | HEIGHT: 63 IN | BODY MASS INDEX: 41.48 KG/M2 | HEART RATE: 103 BPM | OXYGEN SATURATION: 91 %

## 2020-10-30 PROBLEM — I48.0 PAROXYSMAL ATRIAL FIBRILLATION: Status: ACTIVE | Noted: 2020-10-30

## 2020-10-30 PROBLEM — J45.909 CHRONIC ASTHMA: Status: ACTIVE | Noted: 2020-10-30

## 2020-10-30 PROBLEM — D72.829 LEUKOCYTOSIS: Status: ACTIVE | Noted: 2020-10-19

## 2020-10-30 PROBLEM — I25.10 CAD (CORONARY ARTERY DISEASE): Status: ACTIVE | Noted: 2020-10-30

## 2020-10-30 LAB
ALBUMIN SERPL BCP-MCNC: 3.3 G/DL (ref 3.5–5.2)
ALP SERPL-CCNC: 32 U/L (ref 55–135)
ALT SERPL W/O P-5'-P-CCNC: 44 U/L (ref 10–44)
ANION GAP SERPL CALC-SCNC: 8 MMOL/L (ref 8–16)
AST SERPL-CCNC: 21 U/L (ref 10–40)
BASOPHILS # BLD AUTO: 0.03 K/UL (ref 0–0.2)
BASOPHILS NFR BLD: 0.2 % (ref 0–1.9)
BILIRUB SERPL-MCNC: 0.6 MG/DL (ref 0.1–1)
BUN SERPL-MCNC: 17 MG/DL (ref 8–23)
CALCIUM SERPL-MCNC: 8.8 MG/DL (ref 8.7–10.5)
CHLORIDE SERPL-SCNC: 98 MMOL/L (ref 95–110)
CO2 SERPL-SCNC: 30 MMOL/L (ref 23–29)
CREAT SERPL-MCNC: 0.8 MG/DL (ref 0.5–1.4)
DIFFERENTIAL METHOD: ABNORMAL
EOSINOPHIL # BLD AUTO: 0 K/UL (ref 0–0.5)
EOSINOPHIL NFR BLD: 0.3 % (ref 0–8)
ERYTHROCYTE [DISTWIDTH] IN BLOOD BY AUTOMATED COUNT: 14.6 % (ref 11.5–14.5)
EST. GFR  (AFRICAN AMERICAN): >60 ML/MIN/1.73 M^2
EST. GFR  (NON AFRICAN AMERICAN): >60 ML/MIN/1.73 M^2
GLUCOSE SERPL-MCNC: 109 MG/DL (ref 70–110)
HCT VFR BLD AUTO: 40 % (ref 37–48.5)
HGB BLD-MCNC: 13.5 G/DL (ref 12–16)
IMM GRANULOCYTES # BLD AUTO: 0.25 K/UL (ref 0–0.04)
IMM GRANULOCYTES NFR BLD AUTO: 1.6 % (ref 0–0.5)
LYMPHOCYTES # BLD AUTO: 4.7 K/UL (ref 1–4.8)
LYMPHOCYTES NFR BLD: 30.1 % (ref 18–48)
MCH RBC QN AUTO: 31.3 PG (ref 27–31)
MCHC RBC AUTO-ENTMCNC: 33.8 G/DL (ref 32–36)
MCV RBC AUTO: 93 FL (ref 82–98)
MONOCYTES # BLD AUTO: 1 K/UL (ref 0.3–1)
MONOCYTES NFR BLD: 6.4 % (ref 4–15)
NEUTROPHILS # BLD AUTO: 9.6 K/UL (ref 1.8–7.7)
NEUTROPHILS NFR BLD: 61.4 % (ref 38–73)
NRBC BLD-RTO: 0 /100 WBC
PLATELET # BLD AUTO: 221 K/UL (ref 150–350)
PMV BLD AUTO: 9.8 FL (ref 9.2–12.9)
POCT GLUCOSE: 110 MG/DL (ref 70–110)
POCT GLUCOSE: 132 MG/DL (ref 70–110)
POTASSIUM SERPL-SCNC: 4.1 MMOL/L (ref 3.5–5.1)
PROT SERPL-MCNC: 5.8 G/DL (ref 6–8.4)
RBC # BLD AUTO: 4.32 M/UL (ref 4–5.4)
SODIUM SERPL-SCNC: 136 MMOL/L (ref 136–145)
WBC # BLD AUTO: 15.67 K/UL (ref 3.9–12.7)

## 2020-10-30 PROCEDURE — 25000242 PHARM REV CODE 250 ALT 637 W/ HCPCS: Performed by: STUDENT IN AN ORGANIZED HEALTH CARE EDUCATION/TRAINING PROGRAM

## 2020-10-30 PROCEDURE — 25000003 PHARM REV CODE 250: Performed by: STUDENT IN AN ORGANIZED HEALTH CARE EDUCATION/TRAINING PROGRAM

## 2020-10-30 PROCEDURE — G0378 HOSPITAL OBSERVATION PER HR: HCPCS

## 2020-10-30 PROCEDURE — 80053 COMPREHEN METABOLIC PANEL: CPT

## 2020-10-30 PROCEDURE — 85025 COMPLETE CBC W/AUTO DIFF WBC: CPT

## 2020-10-30 PROCEDURE — 93005 ELECTROCARDIOGRAM TRACING: CPT

## 2020-10-30 PROCEDURE — 96374 THER/PROPH/DIAG INJ IV PUSH: CPT

## 2020-10-30 PROCEDURE — 36415 COLL VENOUS BLD VENIPUNCTURE: CPT

## 2020-10-30 RX ORDER — LISINOPRIL 20 MG/1
20 TABLET ORAL ONCE
Status: COMPLETED | OUTPATIENT
Start: 2020-10-30 | End: 2020-10-30

## 2020-10-30 RX ORDER — FAMOTIDINE 10 MG/ML
20 INJECTION INTRAVENOUS ONCE
Status: COMPLETED | OUTPATIENT
Start: 2020-10-30 | End: 2020-10-30

## 2020-10-30 RX ORDER — LISINOPRIL 20 MG/1
40 TABLET ORAL DAILY
Qty: 180 TABLET | Refills: 3 | Status: SHIPPED | OUTPATIENT
Start: 2020-10-30 | End: 2022-08-11 | Stop reason: ALTCHOICE

## 2020-10-30 RX ORDER — LISINOPRIL 20 MG/1
40 TABLET ORAL DAILY
Status: DISCONTINUED | OUTPATIENT
Start: 2020-10-31 | End: 2020-10-30 | Stop reason: HOSPADM

## 2020-10-30 RX ADMIN — APIXABAN 5 MG: 5 TABLET, FILM COATED ORAL at 08:10

## 2020-10-30 RX ADMIN — VITAM B12 100 MCG: 100 TAB at 08:10

## 2020-10-30 RX ADMIN — OXYCODONE HYDROCHLORIDE AND ACETAMINOPHEN 1000 MG: 500 TABLET ORAL at 08:10

## 2020-10-30 RX ADMIN — GABAPENTIN 600 MG: 300 CAPSULE ORAL at 08:10

## 2020-10-30 RX ADMIN — PANTOPRAZOLE SODIUM 40 MG: 40 TABLET, DELAYED RELEASE ORAL at 08:10

## 2020-10-30 RX ADMIN — LISINOPRIL 20 MG: 20 TABLET ORAL at 10:10

## 2020-10-30 RX ADMIN — LISINOPRIL 20 MG: 20 TABLET ORAL at 08:10

## 2020-10-30 RX ADMIN — AZELASTINE HYDROCHLORIDE 137 MCG: 137 SPRAY, METERED NASAL at 08:10

## 2020-10-30 RX ADMIN — ATORVASTATIN CALCIUM 10 MG: 10 TABLET, FILM COATED ORAL at 08:10

## 2020-10-30 RX ADMIN — POLYETHYLENE GLYCOL (3350) 17 G: 17 POWDER, FOR SOLUTION ORAL at 08:10

## 2020-10-30 RX ADMIN — ISOSORBIDE MONONITRATE 30 MG: 30 TABLET, EXTENDED RELEASE ORAL at 08:10

## 2020-10-30 RX ADMIN — DILTIAZEM HYDROCHLORIDE 360 MG: 180 CAPSULE, COATED, EXTENDED RELEASE ORAL at 08:10

## 2020-10-30 RX ADMIN — FAMOTIDINE 20 MG: 10 INJECTION, SOLUTION INTRAVENOUS at 06:10

## 2020-10-30 RX ADMIN — OXYBUTYNIN CHLORIDE 5 MG: 5 TABLET, EXTENDED RELEASE ORAL at 08:10

## 2020-10-30 RX ADMIN — DULOXETINE HYDROCHLORIDE 20 MG: 20 CAPSULE, DELAYED RELEASE ORAL at 08:10

## 2020-10-30 RX ADMIN — FUROSEMIDE 40 MG: 40 TABLET ORAL at 08:10

## 2020-10-30 RX ADMIN — CLOPIDOGREL 75 MG: 75 TABLET, FILM COATED ORAL at 08:10

## 2020-10-30 RX ADMIN — FLUTICASONE PROPIONATE 50 MCG: 50 SPRAY, METERED NASAL at 06:10

## 2020-10-30 NOTE — PROGRESS NOTES
[facility]                    Pharmacy       Discharge Medication Education    Patient ACCEPTED medication education. Pharmacy has provided education on the name, indication, and possible side effects of the medication(s) prescribed, using teach-back method.     The following medications have also been discussed, during this admission.        Medication List        CHANGE how you take these medications      lisinopriL 20 MG tablet  Commonly known as: PRINIVIL,ZESTRIL  Take 2 tablets (40 mg total) by mouth once daily.  What changed: how much to take            CONTINUE taking these medications      * VENTOLIN HFA 90 mcg/actuation inhaler  Generic drug: albuterol     * albuterol 1.25 mg/3 mL Nebu  Commonly known as: ACCUNEB  Take scheduled q4 for the next two days while at home then resume prn dosing     apixaban 5 mg Tab  Commonly known as: ELIQUIS  Take 1 tablet (5 mg total) by mouth 2 (two) times daily.     atorvastatin 10 MG tablet  Commonly known as: LIPITOR  Take 1 tablet (10 mg total) by mouth once daily.     azelastine 137 mcg (0.1 %) nasal spray  Commonly known as: ASTELIN     bisacodyL 5 mg EC tablet  Commonly known as: DULCOLAX     calcium carbonate 600 mg calcium (1,500 mg) Tab  Commonly known as: OS-MICHAEL     CALTRATE 600 + D ORAL     CENTRUM SILVER ORAL     clopidogreL 75 mg tablet  Commonly known as: PLAVIX  Take 1 tablet (75 mg total) by mouth once daily.     diltiaZEM 360 MG Cs24  Commonly known as: TIAZAC  Take 1 capsule (360 mg total) by mouth once daily.     DULoxetine 20 MG capsule  Commonly known as: CYMBALTA  TAKE 1 CAPSULE BY MOUTH EVERY DAY     fexofenadine 60 MG tablet  Commonly known as: ALLEGRA     fish oil-omega-3 fatty acids 300-1,000 mg capsule     fluticasone propionate 50 mcg/actuation nasal spray  Commonly known as: FLONASE     furosemide 40 MG tablet  Commonly known as: LASIX  TAKE 1 TABLET BY MOUTH EVERY DAY     gabapentin 300 MG capsule  Commonly known as:  NEURONTIN  Take 2 capsules (600 mg total) by mouth 3 (three) times daily. NO FURTHER REFILL WITHOUT APT     isosorbide mononitrate 30 MG 24 hr tablet  Commonly known as: IMDUR  Take 1 tablet (30 mg total) by mouth once daily.     LORazepam 0.5 MG tablet  Commonly known as: ATIVAN     metFORMIN 500 MG tablet  Commonly known as: GLUCOPHAGE     nitroGLYCERIN 0.4 MG SL tablet  Commonly known as: NITROSTAT  Place 1 tablet (0.4 mg total) under the tongue every 5 (five) minutes as needed for Chest pain.     ondansetron 4 MG Tbdl  Commonly known as: ZOFRAN-ODT     oxybutynin 5 MG Tr24  Commonly known as: DITROPAN-XL     polyethylene glycol 17 gram Pwpk  Commonly known as: GLYCOLAX     potassium gluconate 550 mg (90 mg) Tab     REFRESH DRY EYE THERAPY OPHT     SYMBICORT 160-4.5 mcg/actuation Hfaa  Generic drug: budesonide-formoterol 160-4.5 mcg     TYLENOL ARTHRITIS 650 MG Tbsr  Generic drug: acetaminophen     VITAMIN B-12 1000 MCG tablet  Generic drug: cyanocobalamin     VITAMIN C 500 MG tablet  Generic drug: ascorbic acid (vitamin C)     vitamin D 1000 units Tab  Commonly known as: VITAMIN D3     zafirlukast 20 MG tablet  Commonly known as: ACCOLATE           * This list has 2 medication(s) that are the same as other medications prescribed for you. Read the directions carefully, and ask your doctor or other care provider to review them with you.                STOP taking these medications      MUCUS RELIEF 400 mg Tab  Generic drug: guaiFENesin     nitrofurantoin (macrocrystal-monohydrate) 100 MG capsule  Commonly known as: MACROBID               Where to Get Your Medications        These medications were sent to [facility]  [address], [place] [address]      Hours: Mon-Fri, 8a-5:30p Phone: [phone]   lisinopriL 20 MG tablet          Thank you  Maurice Garza, PharmD  [phone]

## 2020-10-30 NOTE — PLAN OF CARE
Problem: Adult Inpatient Plan of Care  Goal: Plan of Care Review  Outcome: Ongoing, Progressing  Care plan and chart reviewed

## 2020-10-30 NOTE — PROGRESS NOTES
"[facility] Hospital Medicine Progress Note    Primary Team: [facility] Hospitalist Team A  Attending Physician: Zunilda Marinelli MD  Resident: Nithya  Intern: Tameka    Subjective:      NAEO, pt resting comfortably, some minor coughing this AM, no new CP/SOB or other new complaints.     Objective:     Last 24 Hour Vital Signs:  BP  Min: 135/62  Max: 173/77  Temp  Av.6 °F (36.4 °C)  Min: 96.6 °F (35.9 °C)  Max: 98.1 °F (36.7 °C)  Pulse  Av  Min: 81  Max: 105  Resp  Av.4  Min: 18  Max: 24  SpO2  Av.2 %  Min: 95 %  Max: 98 %  Height  Av' 3" (160 cm)  Min: 5' 3" (160 cm)  Max: 5' 3" (160 cm)  Weight  Av.2 kg (234 lb 2.1 oz)  Min: 106.2 kg (234 lb 2.1 oz)  Max: 106.2 kg (234 lb 2.1 oz)  I/O last 3 completed shifts:  In: 1100 [P.O.:100; IV Piggyback:1000]  Out: 1500 [Urine:1500]    Physical Examination:  General: Alert, in no acute distress  Head: Normocephalic, atraumatic  Eyes: EOMI, PERRL, conjunctiva normal  Throat: Normal mucosa  Neck: Supple, no carotid bruit bilaterally  Cardiovascular: RRR, S1/S2, 3/6 holosystolic murmur right upper sternal border  Pulmonary: diffuse wheezing, unlabored breathing  Abdomen: mildly tender in epigastric region.     Soft, nondistended, active bowel sounds  Extremities: No cyanosis, clubbing, edema, or lesions  Pulses: 2+ in all extremities  Neurologic: AOx3, CN II-XII grossly intact, normal strength, sensation throughout   [place] hump on back.       Laboratory:  Laboratory Data Reviewed: yes  Pertinent Findings:  Recent Labs   Lab 10/29/20  1050 10/30/20  0534   WBC 17.56* 15.67*   HGB 14.6 13.5   HCT 42.5 40.0    221   MCV 91 93   RDW 14.2 14.6*   * 136   K 4.7 4.1   CL 90* 98   CO2 26 30*   BUN 21 17   CREATININE 1.0 0.8   * 109   PROT 6.7 5.8*   ALBUMIN 3.9 3.3*   BILITOT 0.6 0.6   AST 28 21   ALKPHOS 40* 32*   ALT 58* 44         Microbiology Data Reviewed: yes  Pertinent Findings:      Other Results:  EKG   A fib rate controlled in 90s. Axis " ~0. Non-specific ST changes     Radiology Data Reviewed: yes  Pertinent Findings:  X-Ray Chest AP Portable   Final Result      No acute finding or detrimental change when compared with 10/20/2020.         Electronically signed by: Edilberto Cook MD   Date:    10/29/2020   Time:    10:56            Current Medications:     Infusions:       Scheduled:   apixaban  5 mg Oral BID    ascorbic acid (vitamin C)  1,000 mg Oral Daily    atorvastatin  10 mg Oral Daily    azelastine  1 spray Nasal BID    clopidogreL  75 mg Oral Daily    cyanocobalamin  100 mcg Oral Daily    diltiaZEM  360 mg Oral Daily    DULoxetine  20 mg Oral Daily    fluticasone propionate  1 spray Each Nostril QAM    furosemide  40 mg Oral Daily    gabapentin  600 mg Oral TID    isosorbide mononitrate  30 mg Oral Daily    lisinopriL  20 mg Oral Daily    oxybutynin  5 mg Oral Every other day    pantoprazole  40 mg Oral Daily    polyethylene glycol  17 g Oral Daily        PRN:  albuterol-ipratropium, bisacodyL, dextrose 50%, dextrose 50%, glucagon (human recombinant), glucose, glucose, insulin aspart U-100, ondansetron, sodium chloride 0.9%    Antibiotics and Day Number of Therapy:      Lines and Day Number of Therapy:      Assessment:     Adriana Strong is a 75 y.o.female with  Patient Active Problem List    Diagnosis Date Noted    Chest pain 10/29/2020    Comfort measures only status 10/21/2020    Acute cystitis without hematuria     Atrial fibrillation with RVR 10/19/2020    Leukemoid reaction 10/19/2020    Dysuria 10/19/2020    Hyponatremia 09/01/2020    Atrial flutter 05/15/2020    Persistent atrial fibrillation 02/10/2020    Vasovagal syncope 11/26/2019    TACOS (acute kidney injury) 11/26/2019    Headache 11/26/2019    Morbid obesity 11/20/2019    Acute on chronic diastolic (congestive) heart failure 11/20/2019    S/P TAVR (transcatheter aortic valve replacement) 11/19/2019    Lumbar radiculopathy 06/25/2019     Decreased range of motion of trunk and back 06/10/2019    Lumbar spondylosis 06/10/2019    Neuroforaminal stenosis of lumbar spine 06/10/2019    Spinal stenosis of lumbar region with neurogenic claudication 06/10/2019    Low back pain 06/06/2019    Difficulty walking 06/06/2019    Muscle weakness 06/06/2019    Balance problems 06/06/2019    DDD (degenerative disc disease), lumbar 05/14/2019    Cataract 05/14/2019    Diabetic polyneuropathy associated with type 2 diabetes mellitus 02/19/2019    Chronic atrial fibrillation with rapid ventricular response 06/23/2017    Costochondritis 06/23/2017    COPD (chronic obstructive pulmonary disease) 06/13/2017    Umbilical hernia without obstruction and without gangrene 05/09/2017    Incisional hernia, without obstruction or gangrene 05/09/2017    Severe aortic stenosis 02/03/2017    Constipation 01/04/2017    COPD exacerbation 01/02/2017    Old lacunar stroke without late effect 12/09/2016    Hyperlipidemia 06/17/2016    Essential hypertension 04/08/2016    Gastroesophageal reflux disease     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    Chronic mesenteric ischemia 04/16/2015    Enlarged ovary 11/24/2014    Controlled type 2 diabetes mellitus with diabetic polyneuropathy, without long-term current use of insulin 11/24/2014    Severe persistent asthma with acute exacerbation 11/24/2014      75 year old female with Atrial fibirllation, severe aortic stensois with TAVR, CAD, T2DM, chronic mesenteric ischemia with stents, GERD, HLD, HTN, TIA, obesity, asthma with hx of chronic steroid use who presented to [facility] on 10/29/2020 with complaint of palpitations for 2 days. Admitted to [facility] medicine for ACS rule out.     Plan:         Chest pain/ACS rule out & in the setting of CAD  -Presents with intermittent chest pain, nonexertional, possibly associated with cough, heavy, non-radiating for 2 days  -[facility] November 2019 with 70% stenosis in  LAD  -Initial troponin 0.008, will trend  -Initial EKG with non-specific ST changes, will trend  -On Plavix and statin  -Trended EKG and troponins with reassuring results  -was due for stress test on 11/19/20 and will evaluate further if warranted      Paroxysmal AFib  -Continue home Diltiazem 360 mg daily  -Continue Eliquis 5 mg BID  - Follows with Dr. Bee and Dr. Carrington   - Patient has appointment scheduled with Dr. Carrington on 12/08/20  -caffeine cessation      Severe aortic stenosis s/p TAVR  -History of aortic stenosis with TAVR performed on 04/19  -Echo in May of 2020 with no aortic insufficiency  -Stress echo scheduled for 11/19/20  -Follows with Dr. Bee and Dr. Carrington  -Patient has appointment scheduled with Dr. Carrington on 12/08/20     Chronic mesenteric vascular occlusive disease s/p SMA angioplasty with stent placement  - Continue home Plavix 75 mg      Leukocytosis in the setting of recent steroid use  -Patient saw pulmonologist 4 days ago and given 5 day course  -No fever, chills, sputum production, or other signs and symptoms of infection  -Stopped prednisone     Type 2 diabetes mellitus  -On metformin 500 mg BID  will hold while inpatient  -A1c on 10/20/20 5.9  -Sliding scale insulin given recent prednisone usage     Essential Hypertension  -Patient currently on mdur 30 mg, lisinopril 20 mg at home (HCTZ 12.5 stopped on last admission given contraction alkalosis)  Multiple home Bps last week in 140s/90s, will need follow up with PCP to see if HCTZ should be resume when off steroids  -Imdur 30 mg and lisinopril 20 mg continued     Chronic Asthma  -Recent admission for asthma exacerbation, only minimal expiratory wheezes  -PRN duonebs while inpatient  -Will resume home chronic inhalers on discharge     GERD  - Continue Protonix 40 mg daily     Chronic constipation  -Continue Dulcolax 5 mg and polyethylene glycol 17 mg     HLD  -Continue atorvastatin 10 mg     Anxiety  -Continue duloxetine 20  mg     Chronic back pain  -Continue gabapentin 600 mg TID      Possible RADHA  -Patient reports some symptoms of RADHA in the setting of multiple risk factors and pulmonary HTN  -f/u with Pulmonary on discharge and outpatient PSG     Seasonal Allergies  -Flonase     Overactive Bladder  -Continue home oxybutinin 5 mg daily     Nutrition: Cardiac, NPO since midnight  DVT Ppx: Eliquis  Telemetry: Yes  Sims: No     Dispo: Home today        Code Status:      Full      Hany English MD  [facility] Internal Medicine HO-I    [facility] Medicine Hospitalist Pager numbers:   [facility] Hospitalist Medicine Team A (Rajinder/Isis): [phone]  [facility] Hospitalist Medicine Team B (Antoni/Carleen):  [phone]

## 2020-10-30 NOTE — PLAN OF CARE
Pt AAOX4.Pt c/o 10/10 left side chest pain this AM, EKG done , VSS. Notified Dr Barriga of chest pain.  Order received for pepcid IV. Pt stated pain 3/10 after receiving pepcid. Pt a-fib with rate controlled on tele monitor.  Pt resting quietly in bed, no distress noted.  Safety precaution maintained. Will continue to monitor.

## 2020-10-30 NOTE — PLAN OF CARE
Discharge rounds on patient. Discussed followup appointments, blue discharge folder. Pharmacist will go over home medications and reasons for medications. VN to reiterate final discharge instructions. All patient/caregiver questions answered. Patient verbalized understanding.    Future Appointments   Date Time Provider Department Center   11/12/2020 10:30 AM Jr Carrington MD [facility] C[facility]IO [facility]   11/19/2020  9:45 AM LAB, SAME DAY [facility] LAB VNP [facility]   11/19/2020 10:15 AM ECHO, [facility] NOM ECHOSTR [facility]   11/19/2020 11:30 AM [facility] CARDVAL [facility]   12/8/2020 10:00 AM Jr Carrington MD [facility]     PCP F/U at 11/3 at 10 am.       10/30/20 1125   Final Note   Assessment Type Final Discharge Note   Anticipated Discharge Disposition Home   Hospital Follow Up  Appt(s) scheduled? Yes   Discharge plans and expectations educations in teach back method with documentation complete? Yes   Right Care Referral Info   Post Acute Recommendation No Care   Post-Acute Status   Discharge Delays None known at this time     Clara May, RN  RN Transition Navigator  [phone]

## 2020-10-30 NOTE — PLAN OF CARE
Pt oriented. DCA done at bedside with patient. Demographics/Ins/NextofKin/PCP verified with patient/family and updated as needed. Denies any DME needs at present from pt/family. Patient/family plans to return home with family. Educated on bedside RX. Patient consents for [place] to discuss discharge plan with next of kin. Preferences appointment times and location obtained. Patient reports they will have transportation home upon discharge.    This  put name on white board and explained blue discharge folder to patient. Discharge planning brochure and/or business card given to patient.  Patient verbalized understanding.    Future Appointments   Date Time Provider Department Center   11/12/2020 10:30 AM Jr Carrington MD [facility] CARDIO [place] [facility]   11/19/2020  9:45 AM LAB, SAME DAY [facility] LAB VNP [facility]   11/19/2020 10:15 AM ECHO, [facility] NOM ECHOSTR [facility]   11/19/2020 11:30 AM [facility]   12/8/2020 10:00 AM Jr Carrington MD [facility]     F/us with Cards done. F/u with PCP 11/3 at 10am.  at bedside to transport home.       10/30/20 1117   Discharge Assessment   Assessment Type Discharge Planning Assessment   Confirmed/corrected address and phone number on facesheet? Yes   Assessment information obtained from? Patient   Prior to hospitilization cognitive status: Alert/Oriented;No Deficits   Prior to hospitalization functional status: Independent;Assistive Equipment   Current cognitive status: Alert/Oriented;No Deficits   Current Functional Status: Independent;Assistive Equipment   Lives With spouse   Able to Return to Prior Arrangements yes   Is patient able to care for self after discharge? Yes   Patient's perception of discharge disposition home or selfcare   Readmission Within the Last 30 Days no previous admission in last 30 days   Patient currently being followed by outpatient case management? No   Patient currently receives any other  outside agency services? No   Equipment Currently Used at Home nebulizer;cane, straight;glucometer;walker, rolling;wheelchair   Do you have any problems affording any of your prescribed medications? No   Is the patient taking medications as prescribed? yes   Does the patient have transportation home? Yes   Transportation Anticipated family or friend will provide   Discharge Plan A Home   DME Needed Upon Discharge  none   Patient/Family in Agreement with Plan yes   Does the patient have transportation to healthcare appointments? Yes   Readmission Questionnaire   Have you felt down, depressed, or hopeless? 0     Clara May RN  RN Transition Navigator  [phone]

## 2020-10-30 NOTE — DISCHARGE SUMMARY
[facility] Hospital Medicine Discharge Summary    Primary Team: [facility] Hospitalist Team A  Attending Physician: Zunilda Marinelli MD  Resident: Nithya  Intern: Tameka    Date of Admit: 10/29/2020  Date of Discharge: 10/30/2020    Discharge to: Home  Condition: Fair    Discharge Diagnoses     Patient Active Problem List   Diagnosis    Enlarged ovary    Type 2 diabetes mellitus    Severe persistent asthma with acute exacerbation    GERD (gastroesophageal reflux disease)    Chronic mesenteric ischemia    Mesenteric artery insufficiency    Gastroesophageal reflux disease    Essential hypertension    Hyperlipidemia    Old lacunar stroke without late effect    COPD exacerbation    Constipation    Aortic stenosis    Umbilical hernia without obstruction and without gangrene    Incisional hernia, without obstruction or gangrene    COPD (chronic obstructive pulmonary disease)    Chronic atrial fibrillation with rapid ventricular response    Costochondritis    Diabetic polyneuropathy associated with type 2 diabetes mellitus    DDD (degenerative disc disease), lumbar    Cataract    Low back pain    Difficulty walking    Muscle weakness    Balance problems    Decreased range of motion of trunk and back    Lumbar spondylosis    Neuroforaminal stenosis of lumbar spine    Spinal stenosis of lumbar region with neurogenic claudication    Lumbar radiculopathy    S/P TAVR (transcatheter aortic valve replacement)    Morbid obesity    Acute on chronic diastolic (congestive) heart failure    Vasovagal syncope    TACOS (acute kidney injury)    Headache    Persistent atrial fibrillation    Atrial flutter    Hyponatremia    Atrial fibrillation with RVR    Leukocytosis    Dysuria    Comfort measures only status    Acute cystitis without hematuria    Chest pain    CAD (coronary artery disease)    Paroxysmal atrial fibrillation    Chronic asthma       Consultants and Procedures      Consultants:  None    Procedures:   None    Imaging:  X-Ray Chest AP Portable   Final Result      No acute finding or detrimental change when compared with 10/20/2020.         Electronically signed by: Edilberto Cook MD   Date:    10/29/2020   Time:    10:56            Brief History of Present Illness      Adriana Strong is a 75 y.o.  female who  has a past medical history of Acute on chronic diastolic (congestive) heart failure (11/20/2019), Anticoagulant long-term use, Anxiety, Arthritis, Asthma, Atrial fibrillation, Cataracts, bilateral, Chest pain, atypical, Colon polyp, Coronary artery disease, Diabetes mellitus, Dry eyes, Esophageal erosions, GERD (gastroesophageal reflux disease), Heart murmur, Hemorrhoid, High cholesterol, Hypertension, Irritable bowel syndrome, Shingles (2015), Stenosis of aortic and mitral valves, Stroke, TIA (transient ischemic attack)     The patient was in their usual state of health until 10/27/20 when she had multiple episodes of palpitations, CP and tightness after drinking coffee in the morning. ACS has been ruled out. Pt to have close follow up to optimize her afib management.    For the full HPI please refer to the History & Physical from this admission.    Hospital Course By Problem with Pertinent Findings     Chest pain/ACS rule out & in the setting of CAD  -Presents with intermittent chest pain, nonexertional, possibly associated with cough, heavy, non-radiating for 2 days  -[facility] November 2019 with 70% stenosis in LAD  -Initial troponin 0.008, will trend  -Initial EKG with non-specific ST changes  -On Plavix and statin  -Trended EKG and troponins with reassuring results     Paroxysmal AFib  -Continue home Diltiazem 360 mg daily  -Continue Eliquis 5 mg BID  - Follows with Dr. Bee and Dr. Carrington   - Patient has appointment scheduled with Dr. Carrington on 12/08/20  -counseled on caffeine cessation      Severe aortic stenosis s/p TAVR  -History of aortic stenosis with TAVR  performed on 04/19  -Echo in May of 2020 with no aortic insufficiency  -Stress echo scheduled for 11/19/20  -Follows with Dr. Bee and Dr. Carrington  -Patient has appointment scheduled with Dr. Carrington on 12/08/20     Chronic mesenteric vascular occlusive disease s/p SMA angioplasty with stent placement  - Continue home Plavix 75 mg      Leukocytosis in the setting of recent steroid use  -Patient saw pulmonologist 4 days ago and given 5 day course  -No fever, chills, sputum production, or other signs and symptoms of infection  -Stopped prednisone     Type 2 diabetes mellitus  -On metformin 500 mg BID  will hold while inpatient  -A1c on 10/20/20 5.9  -Sliding scale insulin given recent prednisone usage     Essential Hypertension  -Patient currently on mdur 30 mg, lisinopril 20 mg at home (HCTZ 12.5 stopped on last admission given contraction alkalosis)  Multiple home Bps last week in 140s/90s, will need follow up with PCP to see if HCTZ should be resume when off steroids  -Imdur 30 mg and lisinopril 20 mg continued     Chronic Asthma  -Recent admission for asthma exacerbation, only minimal expiratory wheezes  -PRN duonebs while inpatient  -Will resume home chronic inhalers on discharge     GERD  - Continue Protonix 40 mg daily     Chronic constipation  -Continue Dulcolax 5 mg and polyethylene glycol 17 mg     HLD  -Continue atorvastatin 10 mg     Anxiety  -Continue duloxetine 20 mg     Chronic back pain  -Continue gabapentin 600 mg TID      Possible RADHA  -Patient reports some symptoms of RADHA in the setting of multiple risk factors and pulmonary HTN  -f/u with Pulmonary on discharge and outpatient PSG     Seasonal Allergies  -Flonase     Overactive Bladder  -Continue home oxybutinin 5 mg daily         Discharge Medications        Medication List      CHANGE how you take these medications    lisinopriL 20 MG tablet  Commonly known as: PRINIVIL,ZESTRIL  Take 2 tablets (40 mg total) by mouth once daily.  What changed:  how much to take        CONTINUE taking these medications    * VENTOLIN HFA 90 mcg/actuation inhaler  Generic drug: albuterol     * albuterol 1.25 mg/3 mL Nebu  Commonly known as: ACCUNEB  Take scheduled q4 for the next two days while at home then resume prn dosing     apixaban 5 mg Tab  Commonly known as: ELIQUIS  Take 1 tablet (5 mg total) by mouth 2 (two) times daily.     atorvastatin 10 MG tablet  Commonly known as: LIPITOR  Take 1 tablet (10 mg total) by mouth once daily.     azelastine 137 mcg (0.1 %) nasal spray  Commonly known as: ASTELIN     bisacodyL 5 mg EC tablet  Commonly known as: DULCOLAX     calcium carbonate 600 mg calcium (1,500 mg) Tab  Commonly known as: OS-MICHAEL     CALTRATE 600 + D ORAL     CENTRUM SILVER ORAL     clopidogreL 75 mg tablet  Commonly known as: PLAVIX  Take 1 tablet (75 mg total) by mouth once daily.     diltiaZEM 360 MG Cs24  Commonly known as: TIAZAC  Take 1 capsule (360 mg total) by mouth once daily.     DULoxetine 20 MG capsule  Commonly known as: CYMBALTA  TAKE 1 CAPSULE BY MOUTH EVERY DAY     fexofenadine 60 MG tablet  Commonly known as: ALLEGRA     fish oil-omega-3 fatty acids 300-1,000 mg capsule     fluticasone propionate 50 mcg/actuation nasal spray  Commonly known as: FLONASE     furosemide 40 MG tablet  Commonly known as: LASIX  TAKE 1 TABLET BY MOUTH EVERY DAY     gabapentin 300 MG capsule  Commonly known as: NEURONTIN  Take 2 capsules (600 mg total) by mouth 3 (three) times daily. NO FURTHER REFILL WITHOUT APT     isosorbide mononitrate 30 MG 24 hr tablet  Commonly known as: IMDUR  Take 1 tablet (30 mg total) by mouth once daily.     LORazepam 0.5 MG tablet  Commonly known as: ATIVAN     metFORMIN 500 MG tablet  Commonly known as: GLUCOPHAGE     nitroGLYCERIN 0.4 MG SL tablet  Commonly known as: NITROSTAT  Place 1 tablet (0.4 mg total) under the tongue every 5 (five) minutes as needed for Chest pain.     ondansetron 4 MG Tbdl  Commonly known as: ZOFRAN-ODT      oxybutynin 5 MG Tr24  Commonly known as: DITROPAN-XL     polyethylene glycol 17 gram Pwpk  Commonly known as: GLYCOLAX     potassium gluconate 550 mg (90 mg) Tab     REFRESH DRY EYE THERAPY OPHT     SYMBICORT 160-4.5 mcg/actuation Hfaa  Generic drug: budesonide-formoterol 160-4.5 mcg     TYLENOL ARTHRITIS 650 MG Tbsr  Generic drug: acetaminophen     VITAMIN B-12 1000 MCG tablet  Generic drug: cyanocobalamin     VITAMIN C 500 MG tablet  Generic drug: ascorbic acid (vitamin C)     vitamin D 1000 units Tab  Commonly known as: VITAMIN D3     zafirlukast 20 MG tablet  Commonly known as: ACCOLATE         * This list has 2 medication(s) that are the same as other medications prescribed for you. Read the directions carefully, and ask your doctor or other care provider to review them with you.            STOP taking these medications    MUCUS RELIEF 400 mg Tab  Generic drug: guaiFENesin     nitrofurantoin (macrocrystal-monohydrate) 100 MG capsule  Commonly known as: MACROBID           Where to Get Your Medications      These medications were sent to [facility] [address], [place] [address]    Hours: Mon-Fri, 8a-5:30p Phone: [phone]   · lisinopriL 20 MG tablet         Discharge Information:   Diet:  Cardiac    Physical Activity:  As tolerated             Instructions:  1. Take all medications as prescribed  2. Keep all follow-up appointments  3. Return to the hospital or call your primary care physicians if any worsening symptoms such as fever, chest pain, shortness of breath, return of symptoms, or any other concerns.    Follow-Up Appointments:  Future Appointments   Date Time Provider Department Center   11/12/2020 10:30 AM Jr Carrington MD [facility] CARDIO [facility]   11/19/2020  9:45 AM LAB, SAME DAY [facility] LAB [facility]   11/19/2020 10:15 AM ECHO, [facility] NOM ECHOSTR Ronald [facility]   11/19/2020 11:30 AM [facility] CARDMARCELLO Cardenas [facility]   12/8/2020 10:00 AM Jr LOMELI  MD Zeb [facility] CARDIO [facility]         Hany nEglish MD  [facility] Internal [facility], [facility]

## 2020-10-30 NOTE — PLAN OF CARE
Discharge orders noted. Additional clinical references attached. Patient's discharge instructions given by bedside RN and reviewed with the VN.  Education provided on new and previous medications, diagnosis, and follow-up appointments.  New medications delivered by pharmacy. Patient verbalized understanding. Patient's ride/transportation home at bedside. All questions answered. Transport to [facility] requested. Floor nurse notified.

## 2020-10-30 NOTE — NURSING
Plan of care discussed with pt and spouse. AAOx4. Discharge orders written. Family requested to speak with MD. MD rounded on pt at the bedside and answered all questions with daughter on the phone. AVS printed and given to pt, IV, purewick, and telemetry removed. Pt dressing and awaiting transport home.

## 2020-11-04 DIAGNOSIS — K55.1 MESENTERIC ARTERY INSUFFICIENCY: Primary | ICD-10-CM

## 2020-11-08 NOTE — PROGRESS NOTES
VASCULAR SURGERY NOTE    Patient ID: Adriana Strong is a 75 y.o. female.    I. HISTORY     Chief Complaint: follow up    HPI: Adriana Strong is a 75 y.o. female with Afib, CHF, asthma, CAD, HLD, HTN, and GERD who is here today for established patient appointment. She has history of LIZ stenting in 2015 with Dr. Hernandez for chronically occluded SMA. She was last seen 1/15/20 and had elevated velocities in the LIZ and celiac arteries without evidence of stenosis. Today she returns for follow up. Since her last appointment, she has developed an aching pain similar to her initial procedure that is on and off for month long stretches first starting 9 months ago. The pain begins subxiphoid and goes down to her umbilicus, then spreads laterally. The pain recently started up in the last week and begins after some meals. Denies food aversion. She endorses nausea, vomiting, and belching after some meals. Laying down and a heating pad makes her pain better. Pain medication improves her pain. She has lost 10 pounds over the past 2 months without trying, but she has also recently started a cardiac diet after being hospitalized.     ASA: no  Plavix: yes, needs refill  Anticoagulation: apixaban        Past Medical History:   Diagnosis Date    Acute on chronic diastolic (congestive) heart failure 11/20/2019    Anticoagulant long-term use     on Plavix since May 2015    Anxiety     Arthritis     Asthma     Atrial fibrillation     Cataracts, bilateral     Chest pain, atypical     Colon polyp     Coronary artery disease     Diabetes mellitus     Dry eyes     Esophageal erosions     GERD (gastroesophageal reflux disease)     Heart murmur     Hemorrhoid     High cholesterol     Hypertension     Irritable bowel syndrome     Shingles 2015    Stenosis of aortic and mitral valves     Stroke     TIA (transient ischemic attack)     Use of cane as ambulatory aid         Past Surgical History:   Procedure Laterality  Date    ABDOMINAL SURGERY      stents to SMA    angiocele      2007, 2014    AORTIC VALVE REPLACEMENT N/A 11/19/2019    Procedure: Replacement-valve-aortic;  Surgeon: Jack Capone MD;  Location: [facility] CATH LAB;  Service: Cardiology;  Laterality: N/A;    BREAST SURGERY      left--- a lump--- no cancer    CARDIAC VALVE SURGERY      COLONOSCOPY  2014    COLONOSCOPY N/A 3/14/2018    Procedure: COLONOSCOPY;  Surgeon: Efren Kemp MD;  [facility]  Service: Endoscopy;  Laterality: N/A;  ok to hold Plavix 5 days prior to procedure per Dr RESHMA Hernandez     ok per Dr Kemp to hold Eliquis 2 days prior to procedure (see telephone encounter dated-2/7/18)    HERNIA REPAIR      HYSTERECTOMY  partial    1982 partial hysterectomy    LEFT HEART CATHETERIZATION Right 11/5/2019    Procedure: Left heart cath;  Surgeon: Jack Capnoe MD;  Location: [facility] CATH LAB;  Service: Cardiology;  Laterality: Right;    left nasal polyp      left neck lymph node      nose polyp      right hip fatty mass tissue      stent to small intestine      SUPERIOR VENA CAVA ANGIOPLASTY / STENTING      TONSILLECTOMY      TOTAL ABDOMINAL HYSTERECTOMY      2014    TOTAL KNEE ARTHROPLASTY Bilateral     TREATMENT OF CARDIAC ARRHYTHMIA N/A 2/10/2020    Procedure: CARDIOVERSION;  Surgeon: Jayy Parrish MD;  Location: [facility] EP LAB;  Service: Cardiology;  Laterality: N/A;  AF, PEDRO, DCCV, MAC, DM, 3 Prep    TREATMENT OF CARDIAC ARRHYTHMIA N/A 5/15/2020    Procedure: CARDIOVERSION;  Surgeon: Hany Bee MD;  Location: [facility] EP LAB;  Service: Cardiology;  Laterality: N/A;  AF, PEDRO, DCCV, MAC, DM, 3 Prep    UPPER GASTROINTESTINAL ENDOSCOPY  2014       Social History     Tobacco Use   Smoking Status Never Smoker   Smokeless Tobacco Never Used        Review of Systems   Constitution: Negative for chills and fever.   HENT: Negative for ear pain and hoarse voice.    Eyes: Negative for discharge and pain.   Cardiovascular: Negative  for chest pain and palpitations.   Respiratory: Negative for cough, hemoptysis and shortness of breath.    Endocrine: Negative for polydipsia and polyuria.   Skin: Negative for dry skin and rash.   Musculoskeletal: Negative for back pain and neck pain.   Gastrointestinal: Positive for abdominal pain. Negative for diarrhea, nausea and vomiting.   Genitourinary: Negative for dysuria and hematuria.   Neurological: Negative for dizziness and paresthesias.         II. PHYSICAL EXAM     Physical Exam   Constitutional: She is oriented to person, place, and time. She appears well-developed and well-nourished. No distress.   obese   HENT:   Head: Normocephalic and atraumatic.   Eyes: Conjunctivae and EOM are normal. Right eye exhibits no discharge. Left eye exhibits no discharge.   Neck: Normal range of motion. Neck supple.   Cardiovascular: Normal rate, regular rhythm and intact distal pulses.   Pulmonary/Chest: No respiratory distress. She has wheezes.   Abdominal: Soft. Bowel sounds are normal. She exhibits distension. There is no abdominal tenderness. There is no rebound and no guarding.   Musculoskeletal: Normal range of motion.         General: No tenderness or edema.   Neurological: She is alert and oriented to person, place, and time.   Skin: Skin is warm and dry.   Psychiatric: She has a normal mood and affect. Her behavior is normal.     She arrives today in a wheelchair        III. ASSESSMENT & PLAN (MEDICAL DECISION MAKING)     1. Mesenteric artery insufficiency        Imaging Results:  Mesenteric Duplex 1/13/20:  Elevated velocity in celiac artery with evidence of stenosis. Patent LIZ has chronically elevated proximal velocity which is stable from last exam. Suspect that this is is secondary to the stent and does not represent any hemodynamically significant stenosis.    Mesenteric Duplex 11/9/20:  Poor visualization due to bowel gas.    Assessment/Diagnosis and Plan:  75 y.o. female with h/o SMA occlusion and  LIZ stenting. Ultrasound is inconclusive due to patient body habitus and bowel gas. Will get CTA abd/pelvis to evaluate celiac artery and LIZ stent.    -Continue home eliquis for Afib  -Continue plavix for stent patency (Rx refilled)  -Recommend increasing to high intensity statin (atorvastatin 40mg or rosuvastatin 20mg)   -CTA chest/abdomen/pelvis 11/30 and see me in clinic afterwards    SOFIA Cohen II, MD, VI  Vascular Surgeon  [facility]

## 2020-11-09 ENCOUNTER — OFFICE VISIT (OUTPATIENT)
Dept: VASCULAR SURGERY | Facility: CLINIC | Age: 75
End: 2020-11-09
Attending: SURGERY
Payer: MEDICARE

## 2020-11-09 ENCOUNTER — HOSPITAL ENCOUNTER (OUTPATIENT)
Dept: VASCULAR SURGERY | Facility: CLINIC | Age: 75
Discharge: HOME | End: 2020-11-09
Attending: SURGERY
Payer: MEDICARE

## 2020-11-09 VITALS
BODY MASS INDEX: 40.27 KG/M2 | WEIGHT: 235.88 LBS | DIASTOLIC BLOOD PRESSURE: 71 MMHG | HEIGHT: 64 IN | SYSTOLIC BLOOD PRESSURE: 132 MMHG | TEMPERATURE: 98 F | HEART RATE: 86 BPM

## 2020-11-09 DIAGNOSIS — K55.1 CHRONIC MESENTERIC ISCHEMIA: Primary | ICD-10-CM

## 2020-11-09 DIAGNOSIS — K55.1 MESENTERIC ARTERY INSUFFICIENCY: ICD-10-CM

## 2020-11-09 DIAGNOSIS — K55.1 MESENTERIC ARTERY INSUFFICIENCY: Primary | ICD-10-CM

## 2020-11-09 PROCEDURE — 1100F PTFALLS ASSESS-DOCD GE2>/YR: CPT | Mod: CPTII,S$GLB,, | Performed by: SURGERY

## 2020-11-09 PROCEDURE — 3078F DIAST BP <80 MM HG: CPT | Mod: CPTII,S$GLB,, | Performed by: SURGERY

## 2020-11-09 PROCEDURE — 3288F PR FALLS RISK ASSESSMENT DOCUMENTED: ICD-10-PCS | Mod: CPTII,S$GLB,, | Performed by: SURGERY

## 2020-11-09 PROCEDURE — 3288F FALL RISK ASSESSMENT DOCD: CPT | Mod: CPTII,S$GLB,, | Performed by: SURGERY

## 2020-11-09 PROCEDURE — 1159F MED LIST DOCD IN RCRD: CPT | Mod: S$GLB,,, | Performed by: SURGERY

## 2020-11-09 PROCEDURE — 1100F PR PT FALLS ASSESS DOC 2+ FALLS/FALL W/INJURY/YR: ICD-10-PCS | Mod: CPTII,S$GLB,, | Performed by: SURGERY

## 2020-11-09 PROCEDURE — 3075F PR MOST RECENT SYSTOLIC BLOOD PRESS GE 130-139MM HG: ICD-10-PCS | Mod: CPTII,S$GLB,, | Performed by: SURGERY

## 2020-11-09 PROCEDURE — 99999 PR PBB SHADOW E&M-EST. PATIENT-LVL V: CPT | Mod: PBBFAC,,, | Performed by: SURGERY

## 2020-11-09 PROCEDURE — 1126F AMNT PAIN NOTED NONE PRSNT: CPT | Mod: S$GLB,,, | Performed by: SURGERY

## 2020-11-09 PROCEDURE — 99212 OFFICE O/P EST SF 10 MIN: CPT | Mod: S$GLB,,, | Performed by: SURGERY

## 2020-11-09 PROCEDURE — 3078F PR MOST RECENT DIASTOLIC BLOOD PRESSURE < 80 MM HG: ICD-10-PCS | Mod: CPTII,S$GLB,, | Performed by: SURGERY

## 2020-11-09 PROCEDURE — 3075F SYST BP GE 130 - 139MM HG: CPT | Mod: CPTII,S$GLB,, | Performed by: SURGERY

## 2020-11-09 PROCEDURE — 93975 VASCULAR STUDY: CPT | Mod: S$GLB,,, | Performed by: SURGERY

## 2020-11-09 PROCEDURE — 99212 PR OFFICE/OUTPT VISIT, EST, LEVL II, 10-19 MIN: ICD-10-PCS | Mod: S$GLB,,, | Performed by: SURGERY

## 2020-11-09 PROCEDURE — 93975 PR DUPLEX ABD/PEL VASC STUDY,COMPLETE: ICD-10-PCS | Mod: S$GLB,,, | Performed by: SURGERY

## 2020-11-09 PROCEDURE — 1159F PR MEDICATION LIST DOCUMENTED IN MEDICAL RECORD: ICD-10-PCS | Mod: S$GLB,,, | Performed by: SURGERY

## 2020-11-09 PROCEDURE — 99999 PR PBB SHADOW E&M-EST. PATIENT-LVL V: ICD-10-PCS | Mod: PBBFAC,,, | Performed by: SURGERY

## 2020-11-09 PROCEDURE — 1126F PR PAIN SEVERITY QUANTIFIED, NO PAIN PRESENT: ICD-10-PCS | Mod: S$GLB,,, | Performed by: SURGERY

## 2020-11-09 RX ORDER — CLOPIDOGREL BISULFATE 75 MG/1
75 TABLET ORAL DAILY
Qty: 90 TABLET | Refills: 3 | Status: SHIPPED | OUTPATIENT
Start: 2020-11-09 | End: 2022-08-11 | Stop reason: SDUPTHER

## 2020-11-09 RX ORDER — DIPHENHYDRAMINE HCL 50 MG
50 CAPSULE ORAL EVERY 6 HOURS PRN
Qty: 1 CAPSULE | Refills: 0 | Status: SHIPPED | OUTPATIENT
Start: 2020-11-09 | End: 2022-08-11

## 2020-11-09 RX ORDER — ALBUTEROL SULFATE 0.83 MG/ML
SOLUTION RESPIRATORY (INHALATION)
Status: ON HOLD | COMMUNITY
Start: 2020-10-23 | End: 2022-08-12 | Stop reason: HOSPADM

## 2020-11-09 RX ORDER — IPRATROPIUM BROMIDE AND ALBUTEROL SULFATE 2.5; .5 MG/3ML; MG/3ML
3 SOLUTION RESPIRATORY (INHALATION) EVERY 4 HOURS PRN
COMMUNITY
Start: 2020-10-26

## 2020-11-09 RX ORDER — PREDNISONE 50 MG/1
50 TABLET ORAL DAILY
Qty: 3 TABLET | Refills: 0 | Status: SHIPPED | OUTPATIENT
Start: 2020-11-09 | End: 2020-12-08

## 2020-11-10 ENCOUNTER — HOSPITAL ENCOUNTER (EMERGENCY)
Facility: HOSPITAL | Age: 75
Discharge: HOME | End: 2020-11-10
Attending: EMERGENCY MEDICINE
Payer: MEDICARE

## 2020-11-10 VITALS
WEIGHT: 235 LBS | TEMPERATURE: 99 F | RESPIRATION RATE: 16 BRPM | SYSTOLIC BLOOD PRESSURE: 155 MMHG | DIASTOLIC BLOOD PRESSURE: 65 MMHG | BODY MASS INDEX: 40.12 KG/M2 | HEART RATE: 88 BPM | HEIGHT: 64 IN | OXYGEN SATURATION: 99 %

## 2020-11-10 DIAGNOSIS — R07.9 CHEST PAIN: ICD-10-CM

## 2020-11-10 DIAGNOSIS — I10 ESSENTIAL HYPERTENSION: ICD-10-CM

## 2020-11-10 DIAGNOSIS — I50.9 ACUTE ON CHRONIC CONGESTIVE HEART FAILURE, UNSPECIFIED HEART FAILURE TYPE: Primary | ICD-10-CM

## 2020-11-10 LAB
ALBUMIN SERPL BCP-MCNC: 3.3 G/DL (ref 3.5–5.2)
ALP SERPL-CCNC: 43 U/L (ref 55–135)
ALT SERPL W/O P-5'-P-CCNC: 30 U/L (ref 10–44)
ANION GAP SERPL CALC-SCNC: 11 MMOL/L (ref 8–16)
AST SERPL-CCNC: 23 U/L (ref 10–40)
BASOPHILS # BLD AUTO: 0.03 K/UL (ref 0–0.2)
BASOPHILS NFR BLD: 0.4 % (ref 0–1.9)
BILIRUB SERPL-MCNC: 0.3 MG/DL (ref 0.1–1)
BNP SERPL-MCNC: 121 PG/ML (ref 0–99)
BUN SERPL-MCNC: 17 MG/DL (ref 8–23)
CALCIUM SERPL-MCNC: 9.8 MG/DL (ref 8.7–10.5)
CHLORIDE SERPL-SCNC: 92 MMOL/L (ref 95–110)
CO2 SERPL-SCNC: 29 MMOL/L (ref 23–29)
CREAT SERPL-MCNC: 0.9 MG/DL (ref 0.5–1.4)
CTP QC/QA: YES
DIFFERENTIAL METHOD: ABNORMAL
EOSINOPHIL # BLD AUTO: 0 K/UL (ref 0–0.5)
EOSINOPHIL NFR BLD: 0.3 % (ref 0–8)
ERYTHROCYTE [DISTWIDTH] IN BLOOD BY AUTOMATED COUNT: 14.9 % (ref 11.5–14.5)
EST. GFR  (AFRICAN AMERICAN): >60 ML/MIN/1.73 M^2
EST. GFR  (NON AFRICAN AMERICAN): >60 ML/MIN/1.73 M^2
GLUCOSE SERPL-MCNC: 100 MG/DL (ref 70–110)
HCT VFR BLD AUTO: 37.6 % (ref 37–48.5)
HGB BLD-MCNC: 12.2 G/DL (ref 12–16)
IMM GRANULOCYTES # BLD AUTO: 0.04 K/UL (ref 0–0.04)
IMM GRANULOCYTES NFR BLD AUTO: 0.6 % (ref 0–0.5)
LYMPHOCYTES # BLD AUTO: 2 K/UL (ref 1–4.8)
LYMPHOCYTES NFR BLD: 28.8 % (ref 18–48)
MAGNESIUM SERPL-MCNC: 1.6 MG/DL (ref 1.6–2.6)
MCH RBC QN AUTO: 31.1 PG (ref 27–31)
MCHC RBC AUTO-ENTMCNC: 32.4 G/DL (ref 32–36)
MCV RBC AUTO: 96 FL (ref 82–98)
MONOCYTES # BLD AUTO: 0.8 K/UL (ref 0.3–1)
MONOCYTES NFR BLD: 11 % (ref 4–15)
NEUTROPHILS # BLD AUTO: 4.1 K/UL (ref 1.8–7.7)
NEUTROPHILS NFR BLD: 58.9 % (ref 38–73)
NRBC BLD-RTO: 0 /100 WBC
PLATELET # BLD AUTO: 237 K/UL (ref 150–350)
PMV BLD AUTO: 9.5 FL (ref 9.2–12.9)
POTASSIUM SERPL-SCNC: 4.2 MMOL/L (ref 3.5–5.1)
PROT SERPL-MCNC: 6.5 G/DL (ref 6–8.4)
RBC # BLD AUTO: 3.92 M/UL (ref 4–5.4)
SARS-COV-2 RDRP RESP QL NAA+PROBE: NEGATIVE
SODIUM SERPL-SCNC: 132 MMOL/L (ref 136–145)
TROPONIN I SERPL DL<=0.01 NG/ML-MCNC: <0.006 NG/ML (ref 0–0.03)
TSH SERPL DL<=0.005 MIU/L-ACNC: 1.37 UIU/ML (ref 0.4–4)
WBC # BLD AUTO: 7.01 K/UL (ref 3.9–12.7)

## 2020-11-10 PROCEDURE — 83735 ASSAY OF MAGNESIUM: CPT

## 2020-11-10 PROCEDURE — U0002 COVID-19 LAB TEST NON-CDC: HCPCS | Performed by: EMERGENCY MEDICINE

## 2020-11-10 PROCEDURE — 96374 THER/PROPH/DIAG INJ IV PUSH: CPT

## 2020-11-10 PROCEDURE — 99284 PR EMERGENCY DEPT VISIT,LEVEL IV: ICD-10-PCS | Mod: GC,,, | Performed by: INTERNAL MEDICINE

## 2020-11-10 PROCEDURE — 99285 EMERGENCY DEPT VISIT HI MDM: CPT | Mod: CS,,, | Performed by: EMERGENCY MEDICINE

## 2020-11-10 PROCEDURE — 83880 ASSAY OF NATRIURETIC PEPTIDE: CPT

## 2020-11-10 PROCEDURE — 80053 COMPREHEN METABOLIC PANEL: CPT

## 2020-11-10 PROCEDURE — 93005 ELECTROCARDIOGRAM TRACING: CPT

## 2020-11-10 PROCEDURE — 85025 COMPLETE CBC W/AUTO DIFF WBC: CPT

## 2020-11-10 PROCEDURE — 84484 ASSAY OF TROPONIN QUANT: CPT

## 2020-11-10 PROCEDURE — 99285 EMERGENCY DEPT VISIT HI MDM: CPT | Mod: 25

## 2020-11-10 PROCEDURE — 94761 N-INVAS EAR/PLS OXIMETRY MLT: CPT

## 2020-11-10 PROCEDURE — 93010 EKG 12-LEAD: ICD-10-PCS | Mod: ,,, | Performed by: INTERNAL MEDICINE

## 2020-11-10 PROCEDURE — 99284 EMERGENCY DEPT VISIT MOD MDM: CPT | Mod: GC,,, | Performed by: INTERNAL MEDICINE

## 2020-11-10 PROCEDURE — 84443 ASSAY THYROID STIM HORMONE: CPT

## 2020-11-10 PROCEDURE — 63600175 PHARM REV CODE 636 W HCPCS: Performed by: EMERGENCY MEDICINE

## 2020-11-10 PROCEDURE — 25000003 PHARM REV CODE 250: Performed by: EMERGENCY MEDICINE

## 2020-11-10 PROCEDURE — 99285 PR EMERGENCY DEPT VISIT,LEVEL V: ICD-10-PCS | Mod: CS,,, | Performed by: EMERGENCY MEDICINE

## 2020-11-10 PROCEDURE — 93010 ELECTROCARDIOGRAM REPORT: CPT | Mod: ,,, | Performed by: INTERNAL MEDICINE

## 2020-11-10 RX ORDER — ASPIRIN 325 MG
325 TABLET ORAL
Status: DISCONTINUED | OUTPATIENT
Start: 2020-11-10 | End: 2020-11-10 | Stop reason: HOSPADM

## 2020-11-10 RX ORDER — LEVALBUTEROL 1.25 MG/.5ML
1.25 SOLUTION, CONCENTRATE RESPIRATORY (INHALATION)
Status: DISCONTINUED | OUTPATIENT
Start: 2020-11-10 | End: 2020-11-10 | Stop reason: HOSPADM

## 2020-11-10 RX ORDER — HYDROCODONE BITARTRATE AND ACETAMINOPHEN 5; 325 MG/1; MG/1
1 TABLET ORAL
Status: COMPLETED | OUTPATIENT
Start: 2020-11-10 | End: 2020-11-10

## 2020-11-10 RX ORDER — MORPHINE SULFATE 2 MG/ML
2 INJECTION, SOLUTION INTRAMUSCULAR; INTRAVENOUS
Status: DISCONTINUED | OUTPATIENT
Start: 2020-11-10 | End: 2020-11-10

## 2020-11-10 RX ORDER — FUROSEMIDE 10 MG/ML
80 INJECTION INTRAMUSCULAR; INTRAVENOUS
Status: COMPLETED | OUTPATIENT
Start: 2020-11-10 | End: 2020-11-10

## 2020-11-10 RX ORDER — FUROSEMIDE 40 MG/1
40 TABLET ORAL 2 TIMES DAILY
Qty: 6 TABLET | Refills: 0 | Status: SHIPPED | OUTPATIENT
Start: 2020-11-10 | End: 2020-11-19

## 2020-11-10 RX ADMIN — HYDROCODONE BITARTRATE AND ACETAMINOPHEN 1 TABLET: 5; 325 TABLET ORAL at 01:11

## 2020-11-10 RX ADMIN — FUROSEMIDE 80 MG: 10 INJECTION, SOLUTION INTRAMUSCULAR; INTRAVENOUS at 04:11

## 2020-11-10 NOTE — HPI
75 y.o. 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 She says her asthma has been worse over the last month and that she feels her palpitations worsen whenever she uses her bronchodilators. She comes today because she considers this has been happening frequently and wants help to treat it. She also says she has constant chest tightness and LE edema. She has been trying to be compliant with her cardiac diet nowadays and says her  makes sure she never misses any of her medications.    Her SBP is 150-160, troponins are negative x2, BNP was 121 (BMI 40).

## 2020-11-10 NOTE — ED NOTES
Patient identifiers for Adriana Strong 75 y.o. female checked and correct.  Chief Complaint   Patient presents with    Atrial Fibrillation     afib, chest pain     Past Medical History:   Diagnosis Date    Acute on chronic diastolic (congestive) heart failure 11/20/2019    Anticoagulant long-term use     on Plavix since May 2015    Anxiety     Arthritis     Asthma     Atrial fibrillation     Cataracts, bilateral     Chest pain, atypical     Colon polyp     Coronary artery disease     Diabetes mellitus     Dry eyes     Esophageal erosions     GERD (gastroesophageal reflux disease)     Heart murmur     Hemorrhoid     High cholesterol     Hypertension     Irritable bowel syndrome     Shingles 2015    Stenosis of aortic and mitral valves     Stroke     TIA (transient ischemic attack)     Use of cane as ambulatory aid      Allergies reported:   Review of patient's allergies indicates:   Allergen Reactions    Celebrex [celecoxib] Shortness Of Breath    Ciprofloxacin Swelling     lip    Dicyclomine Hives    Adhesive Dermatitis    Avelox [moxifloxacin] Swelling    Cilostazol Other (See Comments)     Elevates blood pressure    Eryc [erythromycin] Other (See Comments)     unknown    Iodine and iodide containing products Hives    Keflex [cephalexin] Hives    Meclomen      rashes    Meloxicam      Ear ringing    Metoclopramide Other (See Comments)     High blood pressure    Sulfa (sulfonamide antibiotics) Itching     LOC: The patient is awake, alert, and oriented to place, time, situation. Affect is appropriate.  Speech is appropriate and clear.     APPEARANCE: Patient resting comfortably in no acute distress.  Patient is clean and well groomed.    SKIN: The skin is warm and dry; color consistent with ethnicity.  Patient has normal skin turgor and moist mucus membranes.  Skin intact; no breakdown or bruising noted.     MUSCULOSKELETAL: Patient moving upper and lower extremities without  difficulty.  Denies weakness.     RESPIRATORY: Airway is open and patent. Respirations spontaneous, even, and non-labored. Chronic SOB.  Patient has a normal effort and rate.  No accessory muscle use noted. Denies cough.     CARDIAC:  Afib noted with a normal rate. No peripheral edema noted. Left side chest pressure     ABDOMEN: Soft and non tender to palpation.  No distention noted.     NEUROLOGIC: Eyes open spontaneously.  Behavior appropriate to situation.  Follows commands; facial expression symmetrical.  Purposeful motor response noted; normal sensation in all extremities.

## 2020-11-10 NOTE — SUBJECTIVE & OBJECTIVE
Past Medical History:   Diagnosis Date    Acute on chronic diastolic (congestive) heart failure 11/20/2019    Anticoagulant long-term use     on Plavix since May 2015    Anxiety     Arthritis     Asthma     Atrial fibrillation     Cataracts, bilateral     Chest pain, atypical     Colon polyp     Coronary artery disease     Diabetes mellitus     Dry eyes     Esophageal erosions     GERD (gastroesophageal reflux disease)     Heart murmur     Hemorrhoid     High cholesterol     Hypertension     Irritable bowel syndrome     Shingles 2015    Stenosis of aortic and mitral valves     Stroke     TIA (transient ischemic attack)     Use of cane as ambulatory aid        Past Surgical History:   Procedure Laterality Date    ABDOMINAL SURGERY      stents to SMA    angiocele      2007, 2014    AORTIC VALVE REPLACEMENT N/A 11/19/2019    Procedure: Replacement-valve-aortic;  Surgeon: Jack Capone MD;  Location: [facility] CATH LAB;  Service: Cardiology;  Laterality: N/A;    BREAST SURGERY      left--- a lump--- no cancer    CARDIAC VALVE SURGERY      COLONOSCOPY  2014    COLONOSCOPY N/A 3/14/2018    Procedure: COLONOSCOPY;  Surgeon: Efren Kemp MD;  Location: [facility] ENDO ([address] [place] [address]);  Service: Endoscopy;  Laterality: N/A;  ok to hold Plavix 5 days prior to procedure per Dr RESHMA Hernandez     ok per Dr Kemp to hold Eliquis 2 days prior to procedure (see telephone encounter dated-2/7/18)    HERNIA REPAIR      HYSTERECTOMY  partial    1982 partial hysterectomy    LEFT HEART CATHETERIZATION Right 11/5/2019    Procedure: Left heart cath;  Surgeon: Jack Capone MD;  Location: [facility] CATH LAB;  Service: Cardiology;  Laterality: Right;    left nasal polyp      left neck lymph node      nose polyp      right hip fatty mass tissue      stent to small intestine      SUPERIOR VENA CAVA ANGIOPLASTY / STENTING      TONSILLECTOMY      TOTAL ABDOMINAL HYSTERECTOMY      2014    TOTAL KNEE ARTHROPLASTY Bilateral      TREATMENT OF CARDIAC ARRHYTHMIA N/A 2/10/2020    Procedure: CARDIOVERSION;  Surgeon: Jayy Parrish MD;  Location: [facility] EP LAB;  Service: Cardiology;  Laterality: N/A;  AF, PEDRO, DCCV, MAC, DM, 3 Prep    TREATMENT OF CARDIAC ARRHYTHMIA N/A 5/15/2020    Procedure: CARDIOVERSION;  Surgeon: Hany Bee MD;  Location: [facility] EP LAB;  Service: Cardiology;  Laterality: N/A;  AF, PEDRO, DCCV, MAC, DM, 3 Prep    UPPER GASTROINTESTINAL ENDOSCOPY  2014       Review of patient's allergies indicates:   Allergen Reactions    Celebrex [celecoxib] Shortness Of Breath    Ciprofloxacin Swelling     lip    Dicyclomine Hives    Adhesive Dermatitis    Avelox [moxifloxacin] Swelling    Cilostazol Other (See Comments)     Elevates blood pressure    Eryc [erythromycin] Other (See Comments)     unknown    Iodine and iodide containing products Hives    Keflex [cephalexin] Hives    Meclomen      rashes    Meloxicam      Ear ringing    Metoclopramide Other (See Comments)     High blood pressure    Sulfa (sulfonamide antibiotics) Itching       Current Facility-Administered Medications on File Prior to Encounter   Medication    0.9%  NaCl infusion    sodium chloride 0.9% flush 5 mL     Current Outpatient Medications on File Prior to Encounter   Medication Sig    acetaminophen (TYLENOL ARTHRITIS) 650 MG TbSR Take 1,300 mg by mouth 2 (two) times daily.    albuterol (PROVENTIL) 2.5 mg /3 mL (0.083 %) nebulizer solution USE 1 VIAL IN NEBULIZER EVERY 4 6 HOURS AS NEEDED    albuterol-ipratropium (DUO-NEB) 2.5 mg-0.5 mg/3 mL nebulizer solution NEBULIZE 1 VIAL EVERY 4 HOURS AS NEEDED    apixaban (ELIQUIS) 5 mg Tab Take 1 tablet (5 mg total) by mouth 2 (two) times daily.    ascorbic acid (VITAMIN C) 500 MG tablet Take 1,000 mg by mouth once daily.     atorvastatin (LIPITOR) 10 MG tablet Take 1 tablet (10 mg total) by mouth once daily.    azelastine (ASTELIN) 137 mcg nasal spray 1 spray by Nasal route 2 (two) times daily.     bisacodyl (DULCOLAX) 5 mg EC tablet Take 5 mg by mouth daily as needed for Constipation.    budesonide-formoterol 160-4.5 mcg (SYMBICORT) 160-4.5 mcg/actuation HFAA Inhale 2 puffs into the lungs every 12 (twelve) hours. Controller    calcium carbonate (OS-MICHAEL) 600 mg (1,500 mg) Tab Take 1,200 mg by mouth once daily.    calcium carbonate/vitamin D3 (CALTRATE 600 + D ORAL) Take by mouth.    clopidogreL (PLAVIX) 75 mg tablet Take 1 tablet (75 mg total) by mouth once daily.    cyanocobalamin (VITAMIN B-12) 1000 MCG tablet Take 100 mcg by mouth once daily.    diltiaZEM (TIAZAC) 360 MG Cs24 Take 1 capsule (360 mg total) by mouth once daily.    diphenhydrAMINE (BENADRYL) 50 MG capsule Take 1 capsule (50 mg total) by mouth every 6 (six) hours as needed for Itching.    DULoxetine (CYMBALTA) 20 MG capsule TAKE 1 CAPSULE BY MOUTH EVERY DAY    fexofenadine (ALLEGRA) 60 MG tablet Take 180 mg by mouth every morning.     fish oil-omega-3 fatty acids 300-1,000 mg capsule Take 1 g by mouth once daily.     fluticasone (FLONASE) 50 mcg/actuation nasal spray 1 spray by Each Nare route every morning.     folic acid/multivit-min/lutein (CENTRUM SILVER ORAL) Take by mouth.    furosemide (LASIX) 40 MG tablet TAKE 1 TABLET BY MOUTH EVERY DAY    gabapentin (NEURONTIN) 300 MG capsule Take 2 capsules (600 mg total) by mouth 3 (three) times daily. NO FURTHER REFILL WITHOUT APT    isosorbide mononitrate (IMDUR) 30 MG 24 hr tablet Take 1 tablet (30 mg total) by mouth once daily.    lisinopriL (PRINIVIL,ZESTRIL) 20 MG tablet Take 2 tablets (40 mg total) by mouth once daily.    lorazepam (ATIVAN) 0.5 MG tablet Take 0.5 mg by mouth every morning.     metFORMIN (GLUCOPHAGE) 500 MG tablet Take 500 mg by mouth 2 (two) times daily.    nitroGLYCERIN (NITROSTAT) 0.4 MG SL tablet Place 1 tablet (0.4 mg total) under the tongue every 5 (five) minutes as needed for Chest pain.    ondansetron (ZOFRAN-ODT) 4 MG TbDL DISSOLVE 1 TABLET ON  TONGUE EVERY 8 HOURS AS NEEDED FOR NAUSEA    oxybutynin (DITROPAN-XL) 5 MG TR24 Take 5 mg by mouth every other day.     polyethylene glycol (GLYCOLAX) 17 gram PwPk Take by mouth.    POLYSORBATE 80/GLYCERIN (REFRESH DRY EYE THERAPY OPHT) Apply to eye 2 (two) times daily.    potassium gluconate 550 mg (90 mg) Tab Take by mouth. 2 in am and 2 pm    predniSONE (DELTASONE) 50 MG Tab Take 1 tablet (50 mg total) by mouth once daily.    vitamin D (VITAMIN D3) 1000 units Tab Take 2,000 Units by mouth once daily.    zafirlukast (ACCOLATE) 20 MG tablet Take 20 mg by mouth once daily.     Family History     Problem Relation (Age of Onset)    Heart attack Mother    Heart failure Brother    Stroke Father        Tobacco Use    Smoking status: Never Smoker    Smokeless tobacco: Never Used   Substance and Sexual Activity    Alcohol use: No    Drug use: No    Sexual activity: Not on file     Review of Systems   Constitution: Negative for chills and decreased appetite.   HENT: Negative for congestion, ear discharge and ear pain.    Eyes: Negative for blurred vision and discharge.   Cardiovascular: Positive for dyspnea on exertion, irregular heartbeat, leg swelling and palpitations. Negative for chest pain, claudication and cyanosis.   Respiratory: Positive for cough and shortness of breath. Negative for hemoptysis.    Endocrine: Negative for cold intolerance and heat intolerance.   Skin: Negative for color change and nail changes.   Musculoskeletal: Negative for arthritis and back pain.   Gastrointestinal: Negative for bloating and abdominal pain.   Genitourinary: Negative for bladder incontinence and decreased libido.   Neurological: Negative for aphonia and brief paralysis.     Objective:     Vital Signs (Most Recent):  Temp: 98.2 °F (36.8 °C) (11/10/20 1033)  Pulse: 84 (11/10/20 1322)  Resp: 16 (11/10/20 1305)  BP: (!) 153/66 (11/10/20 1321)  SpO2: 95 % (11/10/20 1322) Vital Signs (24h Range):  Temp:  [98.2 °F (36.8  °C)] 98.2 °F (36.8 °C)  Pulse:  [84-92] 84  Resp:  [16-18] 16  SpO2:  [95 %-98 %] 95 %  BP: (153-164)/(66-68) 153/66     Weight: 106.6 kg (235 lb)  Body mass index is 40.34 kg/m².    SpO2: 95 %       No intake or output data in the 24 hours ending 11/10/20 1451    Lines/Drains/Airways     None                 Physical Exam   Constitutional: She is oriented to person, place, and time. She appears well-developed and well-nourished.   HENT:   Head: Normocephalic and atraumatic.   Nose: Nose normal.   Mouth/Throat: No oropharyngeal exudate.   Eyes: Right eye exhibits no discharge. Left eye exhibits no discharge. No scleral icterus.   Neck: Normal range of motion. Neck supple.   Cardiovascular: S1 normal and S2 normal. Exam reveals no gallop, no S3, no S4, no distant heart sounds, no friction rub, no midsystolic click and no opening snap.   No murmur heard.  Pulses:       Radial pulses are 2+ on the right side and 2+ on the left side.        Femoral pulses are 2+ on the right side and 2+ on the left side.  Normal rate, irregularly irregular rhythm.   Pulmonary/Chest: Effort normal. No respiratory distress. She has wheezes. She has rales. She exhibits no tenderness.   Abdominal: Soft. Bowel sounds are normal. She exhibits no distension. There is no abdominal tenderness. There is no rebound.   Musculoskeletal: Normal range of motion.         General: No tenderness or deformity.      Comments: BL LE edema.   Lymphadenopathy:     She has no cervical adenopathy.   Neurological: She is alert and oriented to person, place, and time. No cranial nerve deficit.   Skin: Skin is warm and dry.   Psychiatric: She has a normal mood and affect. Her behavior is normal.       Significant Labs: All pertinent lab results from the last 24 hours have been reviewed.    Significant Imaging: All pertinent images from the last 24h have been reviewed.

## 2020-11-10 NOTE — PROVIDER PROGRESS NOTES - EMERGENCY DEPT.
Encounter Date: 11/10/2020    ED Physician Progress Notes         EKG - STEMI Decision  Initial Reading: No STEMI present.    atrial fibrillation  Unchanged compared to 10/30/20

## 2020-11-10 NOTE — DISCHARGE INSTRUCTIONS
Take your lasix 40 mg twice a day for the next few days.  Follow-up with your cardiologist as scheduled.  Return to the emergency department for any worsening symptoms.

## 2020-11-10 NOTE — ED NOTES
Patient placed on purewick catheter on portable suction at this time. Will continue to monitor output

## 2020-11-10 NOTE — ED PROVIDER NOTES
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  Patient history of AFib with and she feels that sometimes more and sometimes less.  She reports that this morning at approximately 8:15 a.m. she felt AFib more severe than normal.  She reports strange jerking and pain.  Symptoms were present in the left chest.  No radiation.  Pain was severe and 10/10 at onset.  It has been constant since that time but has since improved to an 8/10.  Patient had associated severe headache at onset and her  gave her Tylenol.  The headache has improved in severity.  Patient does report some shortness of breath but this is at her baseline.  She denies any diaphoresis during the episode.  She reports an associated short cough.  Of note, patient has had 2 recent hospitalizations.  The 1st was for an asthma exacerbation and the 2nd was for chest pain.  Patient feels that the asthma has improved somewhat.  During the hospitalization for chest pain, ACS was ruled out she has a follow-up appointment with cardiology next month but given the severity of symptoms today, patient elected to go to [facility] to be evaluated by Cardiology.  Nothing seems to make the pain better worse.        Review of patient's allergies indicates:   Allergen Reactions    Celebrex [celecoxib] Shortness Of Breath    Ciprofloxacin Swelling     lip    Dicyclomine Hives    Adhesive Dermatitis    Avelox [moxifloxacin] Swelling    Cilostazol Other (See Comments)     Elevates blood pressure    Eryc [erythromycin] Other (See Comments)     unknown    Iodine and iodide containing products Hives    Keflex [cephalexin] Hives    Meclomen       rashes    Meloxicam      Ear ringing    Metoclopramide Other (See Comments)     High blood pressure    Sulfa (sulfonamide antibiotics) Itching     Past Medical History:   Diagnosis Date    Acute on chronic diastolic (congestive) heart failure 11/20/2019    Anticoagulant long-term use     on Plavix since May 2015    Anxiety     Arthritis     Asthma     Atrial fibrillation     Cataracts, bilateral     Chest pain, atypical     Colon polyp     Coronary artery disease     Diabetes mellitus     Dry eyes     Esophageal erosions     GERD (gastroesophageal reflux disease)     Heart murmur     Hemorrhoid     High cholesterol     Hypertension     Irritable bowel syndrome     Shingles 2015    Stenosis of aortic and mitral valves     Stroke     TIA (transient ischemic attack)     Use of cane as ambulatory aid      Past Surgical History:   Procedure Laterality Date    ABDOMINAL SURGERY      stents to SMA    angiocele      2007, 2014    AORTIC VALVE REPLACEMENT N/A 11/19/2019    Procedure: Replacement-valve-aortic;  Surgeon: Jack Capone MD;  Location: [facility] CATH LAB;  Service: Cardiology;  Laterality: N/A;    BREAST SURGERY      left--- a lump--- no cancer    CARDIAC VALVE SURGERY      COLONOSCOPY  2014    COLONOSCOPY N/A 3/14/2018    Procedure: COLONOSCOPY;  Surgeon: Efren Kemp MD;  Location: [facility] ENDO ([address] [place] [address]);  Service: Endoscopy;  Laterality: N/A;  ok to hold Plavix 5 days prior to procedure per Dr RESHMA Hernandez     ok per Dr Kemp to hold Eliquis 2 days prior to procedure (see telephone encounter dated-2/7/18)    HERNIA REPAIR      HYSTERECTOMY  partial    1982 partial hysterectomy    LEFT HEART CATHETERIZATION Right 11/5/2019    Procedure: Left heart cath;  Surgeon: Jack Capone MD;  Location: [facility] CATH LAB;  Service: Cardiology;  Laterality: Right;    left nasal polyp      left neck lymph node      nose polyp      right hip fatty mass tissue      stent to small intestine       SUPERIOR VENA CAVA ANGIOPLASTY / STENTING      TONSILLECTOMY      TOTAL ABDOMINAL HYSTERECTOMY      2014    TOTAL KNEE ARTHROPLASTY Bilateral     TREATMENT OF CARDIAC ARRHYTHMIA N/A 2/10/2020    Procedure: CARDIOVERSION;  Surgeon: Jayy Parrish MD;  Location: [facility] EP LAB;  Service: Cardiology;  Laterality: N/A;  AF, PEDRO, DCCV, MAC, DM, 3 Prep    TREATMENT OF CARDIAC ARRHYTHMIA N/A 5/15/2020    Procedure: CARDIOVERSION;  Surgeon: Hany Bee MD;  Location: [facility] EP LAB;  Service: Cardiology;  Laterality: N/A;  AF, PEDRO, DCCV, MAC, DM, 3 Prep    UPPER GASTROINTESTINAL ENDOSCOPY  2014     Family History   Problem Relation Age of Onset    Heart attack Mother     Stroke Father     Heart failure Brother     Colon cancer Neg Hx     Inflammatory bowel disease Neg Hx      Social History     Tobacco Use    Smoking status: Never Smoker    Smokeless tobacco: Never Used   Substance Use Topics    Alcohol use: No    Drug use: No     Review of Systems   Constitutional: Negative for diaphoresis and fever.   HENT: Negative for sore throat.    Respiratory: Positive for cough (Nonproductive) and shortness of breath (At baseline).    Cardiovascular: Positive for chest pain and palpitations.   Gastrointestinal: Negative for nausea.   Genitourinary: Negative for dysuria.   Musculoskeletal: Negative for back pain.   Skin: Negative for rash.   Neurological: Positive for headaches. Negative for weakness.   Hematological: Does not bruise/bleed easily.       Physical Exam     Initial Vitals [11/10/20 1033]   BP Pulse Resp Temp SpO2   (!) 164/68 92 18 98.2 °F (36.8 °C) 98 %      MAP       --         Physical Exam    Nursing note and vitals reviewed.  Constitutional: She appears well-developed and well-nourished. She is not diaphoretic. No distress.   HENT:   Head: Normocephalic and atraumatic.   Eyes: EOM are normal. Right eye exhibits no discharge. Left eye exhibits no discharge. No scleral icterus.   Neck: Normal range of  motion. Neck supple. No JVD present.   Cardiovascular: Normal rate and intact distal pulses. An irregularly irregular rhythm present.  Exam reveals no gallop and no friction rub.    No murmur heard.  Pulmonary/Chest: Breath sounds normal. No respiratory distress. She has no wheezes. She has no rhonchi. She has no rales. She exhibits no tenderness.   Speaking complete sentences on room air; rare cough   Abdominal: Soft. Bowel sounds are normal. She exhibits no distension and no mass. There is no abdominal tenderness. There is no rebound and no guarding.   Musculoskeletal: Normal range of motion. Edema (Trace to bilateral lower extremities) present. No tenderness.   Neurological: She is alert and oriented to person, place, and time. She has normal strength.   Skin: Skin is warm and dry. Capillary refill takes less than 2 seconds.   Psychiatric: She has a normal mood and affect.         ED Course   Procedures  Labs Reviewed   CBC W/ AUTO DIFFERENTIAL - Abnormal; Notable for the following components:       Result Value    RBC 3.92 (*)     MCH 31.1 (*)     RDW 14.9 (*)     Immature Granulocytes 0.6 (*)     All other components within normal limits   COMPREHENSIVE METABOLIC PANEL - Abnormal; Notable for the following components:    Sodium 132 (*)     Chloride 92 (*)     Albumin 3.3 (*)     Alkaline Phosphatase 43 (*)     All other components within normal limits   B-TYPE NATRIURETIC PEPTIDE - Abnormal; Notable for the following components:     (*)     All other components within normal limits   TROPONIN I   TROPONIN I   MAGNESIUM   TSH   TROPONIN I   SARS-COV-2 RDRP GENE    Narrative:     This test utilizes isothermal nucleic acid amplification   technology to detect the SARS-CoV-2 RdRp nucleic acid segment.   The analytical sensitivity (limit of detection) is 125 genome   equivalents/mL.   A POSITIVE result implies infection with the SARS-CoV-2 virus;   the patient is presumed to be contagious.     A NEGATIVE  result means that SARS-CoV-2 nucleic acids are not   present above the limit of detection. A NEGATIVE result should be   treated as presumptive. It does not rule out the possibility of   COVID-19 and should not be the sole basis for treatment decisions.   If COVID-19 is strongly suspected based on clinical and exposure   history, re-testing using an alternate molecular assay should be   considered.   This test is only for use under the Food and Drug   Administration s Emergency Use Authorization (EUA).   Commercial kits are provided by [employer].   Performance characteristics of the EUA have been independently   verified by [facility] Department of   Pathology and Laboratory Medicine.   _________________________________________________________________   The authorized Fact Sheet for Healthcare Providers and the authorized Fact   Sheet for Patients of the ID NOW COVID-19 are available on the FDA   website:     https://www.fda.gov/media/233933/download  https://www.fda.gov/media/324324/download         EKG Readings: (Independently Interpreted)   Initial Reading: No STEMI. Rhythm: Atrial Fibrillation. Heart Rate: 83. ST Segments: Normal ST Segments. T Waves: Normal. Axis: Normal.     ECG Results          EKG 12-lead (Final result)  Result time 11/10/20 16:39:48    Final result by Interface, Lab In [facility] (11/10/20 16:39:48)                 Narrative:    Test Reason : R07.9,    Vent. Rate : 083 BPM     Atrial Rate : 079 BPM     P-R Int : 000 ms          QRS Dur : 086 ms      QT Int : 366 ms       P-R-T Axes : 000 002 038 degrees     QTc Int : 430 ms    Atrial fibrillation  Nonspecific ST abnormality  Abnormal ECG  When compared with ECG of 30-OCT-2020 05:30,  Rate slower  Confirmed by JAI SHER MD ([id number]) on 11/10/2020 4:39:37 PM    Referred By: [email or web address]   SELF           Confirmed By:JAI SHER MD                            Imaging Results          X-Ray Chest AP Portable (Final result)  Result time  11/10/20 12:35:05    Final result by Robbie Feldman MD (11/10/20 12:35:05)                 Impression:      No significant detrimental interval change in the appearance of the chest since 10/29/2020.      Electronically signed by: Robbie Feldman MD  Date:    11/10/2020  Time:    12:35             Narrative:    EXAMINATION:  XR CHEST AP PORTABLE    CLINICAL HISTORY:  Chest Pain;    COMPARISON:  Comparison is made to 10/29/2020.    FINDINGS:  Aortic valvular prosthesis again observed.  Heart size and the appearance of the cardiomediastinal silhouette demonstrate no detrimental change since the examination referenced above.  Pulmonary vascularity remains normal.  Lung zones are stable as well, with no significant airspace consolidation or volume loss observed.  No pleural fluid of any substantial volume is seen on either side.  No pneumothorax.  Continued demonstration of some elevation of the right hemidiaphragm.                                 Medical Decision Making:   History:   I obtained history from: someone other than patient.  Old Medical Records: I decided to obtain old medical records.  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  Differential Diagnosis:   Atrial fibrillation, ACS, electrolyte abnormalities, dysrhythmia, pneumonia, asthma exacerbation  Clinical Tests:   Lab Tests: Ordered  Radiological Study: Ordered  Medical Tests: Ordered  ED Management:  Will obtain labs and chest x-ray.  Will administer analgesics.    Reassessment: COVID negative. CBC without leukocytosis or anemia. CMP with mild hyponatremia 132. Troponin normal. BNP mildly elevated at 121. TSH normal. CXR without acute process.  On repeat assessment, patient remains resting  comfortably.  Given multiple presentations with chest pain without a clear etiology, the patient elected to come to [facility] today for specialist evaluation.  She is supposed to follow-up with Dr. Capone November 19th but could not wait that long.  Cardiology consulted for recommendations.  Patient was evaluated by Cardiology to feel that her symptoms are consistent with heart failure.  They recommend diuresis with 80 of IV Lasix.  Appreciate their recommendations.  Will administer Xopenex.  Observation per case management.  Discussed with Hospital Medicine and they recommend awaiting to see how patient diureses prior to admitting.  Cardiology informed.  Patient was evaluated by Cardiology staff who suggest that should patient diurese okay to be discharged on increased diuresis for home.    Reassessment:  Repeat troponin normal.  On repeat assessment, patient resting comfortably on room air.  She reports she is chest pain-free.  She has been diuresed approximately 2 L. given successful diuresis, patient appears stable for discharge as cardiology recommended.  Patient instructed to take her Lasix 40 b.i.d. instead of q.d..  She has follow-up with Cardiology in 2 days.  Patient is comfortable with discharge.  Provided with return precautions.                               Clinical Impression:       ICD-10-CM ICD-9-CM   1. Acute on chronic congestive heart failure, unspecified heart failure type  I50.9 428.0   2. Chest pain  R07.9 786.50   3. Essential hypertension  I10 401.9                          ED Disposition Condition    Discharge Stable        ED Prescriptions     Medication Sig Dispense Start Date End Date Auth. Provider    furosemide (LASIX) 40 MG tablet Take 1 tablet (40 mg total) by mouth 2 (two) times a day. for 3 days 6 tablet 11/10/2020 11/13/2020 Gabe Bello MD        Follow-up Information    None                                      Gabe Bello MD  11/10/20 2108

## 2020-11-10 NOTE — ASSESSMENT & PLAN NOTE
HFpEF exacerbation in the setting of Afib and uncontrolled asthma  Troponins negative x2, EKG w AF  BNP elevated at 121 (BMI 40)  Recommendations:  -Give lasix 80 IV once and monitor urine output, followed by lasix 80 IV BID until euvolemic  -Replace electrolytes to keep K > 4, Phos > 3, and Mg >2  -Strict I/Os  -Daily weights  -Cardiac diet  -Can admit to hospital medicine  -Continue home HF regimen except for PO lasix  -If nebs are given, prefer levalbuterol rather than albuterol    Discussed with Dr. Faria

## 2020-11-10 NOTE — CONSULTS
[facility]  Cardiology  Consult Note    Patient Name: Adriana Strong  MRN: [id number]  Admission Date: 11/10/2020  Hospital Length of Stay: 0 days  Code Status: Prior   Attending Provider: Gabe Bello MD   Consulting Provider: Sharan Kimball MD  Primary Care Physician: Krzysztof Mcgraw MD  Principal Problem:<principal problem not specified>    Patient information was obtained from patient, past medical records and ER records.     Inpatient consult to Cardiology  Consult performed by: Sharan Kimball MD  Consult ordered by: Gabe Bello MD        Subjective:     Chief Complaint:  Palpitations     HPI:   75 y.o. 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 She says her asthma has been worse over the last month and that she feels her palpitations worsen whenever she uses her bronchodilators. She comes today because she considers this has been happening frequently and wants help to treat it. She also says she has constant chest tightness and LE edema. She has been trying to be compliant with her cardiac diet nowadays and says her  makes sure she never misses any of her medications.    Her SBP is 150-160, troponins are negative x2, BNP was 121 (BMI 40).    Past Medical History:   Diagnosis Date    Acute on chronic diastolic (congestive) heart failure 11/20/2019    Anticoagulant long-term use     on Plavix since May 2015    Anxiety     Arthritis     Asthma     Atrial fibrillation     Cataracts, bilateral     Chest pain, atypical     Colon polyp     Coronary artery disease     Diabetes mellitus     Dry eyes     Esophageal erosions     GERD (gastroesophageal reflux disease)     Heart murmur     Hemorrhoid     High  cholesterol     Hypertension     Irritable bowel syndrome     Shingles 2015    Stenosis of aortic and mitral valves     Stroke     TIA (transient ischemic attack)     Use of cane as ambulatory aid        Past Surgical History:   Procedure Laterality Date    ABDOMINAL SURGERY      stents to SMA    angiocele      2007, 2014    AORTIC VALVE REPLACEMENT N/A 11/19/2019    Procedure: Replacement-valve-aortic;  Surgeon: Jack Capone MD;  Location: [facility] CATH LAB;  Service: Cardiology;  Laterality: N/A;    BREAST SURGERY      left--- a lump--- no cancer    CARDIAC VALVE SURGERY      COLONOSCOPY  2014    COLONOSCOPY N/A 3/14/2018    Procedure: COLONOSCOPY;  Surgeon: Efren Kemp MD;  Location: [facility] ENDO (4TH FLR);  Service: Endoscopy;  Laterality: N/A;  ok to hold Plavix 5 days prior to procedure per Dr RESHMA Hernandez     ok per Dr Kemp to hold Eliquis 2 days prior to procedure (see telephone encounter dated-2/7/18)    HERNIA REPAIR      HYSTERECTOMY  partial    1982 partial hysterectomy    LEFT HEART CATHETERIZATION Right 11/5/2019    Procedure: Left heart cath;  Surgeon: Jack Capone MD;  Location: [facility] CATH LAB;  Service: Cardiology;  Laterality: Right;    left nasal polyp      left neck lymph node      nose polyp      right hip fatty mass tissue      stent to small intestine      SUPERIOR VENA CAVA ANGIOPLASTY / STENTING      TONSILLECTOMY      TOTAL ABDOMINAL HYSTERECTOMY      2014    TOTAL KNEE ARTHROPLASTY Bilateral     TREATMENT OF CARDIAC ARRHYTHMIA N/A 2/10/2020    Procedure: CARDIOVERSION;  Surgeon: Jayy Parrish MD;  Location: [facility] EP LAB;  Service: Cardiology;  Laterality: N/A;  AF, PEDRO, DCCV, MAC, DM, 3 Prep    TREATMENT OF CARDIAC ARRHYTHMIA N/A 5/15/2020    Procedure: CARDIOVERSION;  Surgeon: Hany Bee MD;  Location: [facility] EP LAB;  Service: Cardiology;  Laterality: N/A;  AF, PEDRO, DCCV, MAC, DM, 3 Prep    UPPER GASTROINTESTINAL ENDOSCOPY  2014       Review of patient's  allergies indicates:   Allergen Reactions    Celebrex [celecoxib] Shortness Of Breath    Ciprofloxacin Swelling     lip    Dicyclomine Hives    Adhesive Dermatitis    Avelox [moxifloxacin] Swelling    Cilostazol Other (See Comments)     Elevates blood pressure    Eryc [erythromycin] Other (See Comments)     unknown    Iodine and iodide containing products Hives    Keflex [cephalexin] Hives    Meclomen      rashes    Meloxicam      Ear ringing    Metoclopramide Other (See Comments)     High blood pressure    Sulfa (sulfonamide antibiotics) Itching       Current Facility-Administered Medications on File Prior to Encounter   Medication    0.9%  NaCl infusion    sodium chloride 0.9% flush 5 mL     Current Outpatient Medications on File Prior to Encounter   Medication Sig    acetaminophen (TYLENOL ARTHRITIS) 650 MG TbSR Take 1,300 mg by mouth 2 (two) times daily.    albuterol (PROVENTIL) 2.5 mg /3 mL (0.083 %) nebulizer solution USE 1 VIAL IN NEBULIZER EVERY 4 6 HOURS AS NEEDED    albuterol-ipratropium (DUO-NEB) 2.5 mg-0.5 mg/3 mL nebulizer solution NEBULIZE 1 VIAL EVERY 4 HOURS AS NEEDED    apixaban (ELIQUIS) 5 mg Tab Take 1 tablet (5 mg total) by mouth 2 (two) times daily.    ascorbic acid (VITAMIN C) 500 MG tablet Take 1,000 mg by mouth once daily.     atorvastatin (LIPITOR) 10 MG tablet Take 1 tablet (10 mg total) by mouth once daily.    azelastine (ASTELIN) 137 mcg nasal spray 1 spray by Nasal route 2 (two) times daily.    bisacodyl (DULCOLAX) 5 mg EC tablet Take 5 mg by mouth daily as needed for Constipation.    budesonide-formoterol 160-4.5 mcg (SYMBICORT) 160-4.5 mcg/actuation HFAA Inhale 2 puffs into the lungs every 12 (twelve) hours. Controller    calcium carbonate (OS-MIHCAEL) 600 mg (1,500 mg) Tab Take 1,200 mg by mouth once daily.    calcium carbonate/vitamin D3 (CALTRATE 600 + D ORAL) Take by mouth.    clopidogreL (PLAVIX) 75 mg tablet Take 1 tablet (75 mg total) by mouth once daily.     cyanocobalamin (VITAMIN B-12) 1000 MCG tablet Take 100 mcg by mouth once daily.    diltiaZEM (TIAZAC) 360 MG Cs24 Take 1 capsule (360 mg total) by mouth once daily.    diphenhydrAMINE (BENADRYL) 50 MG capsule Take 1 capsule (50 mg total) by mouth every 6 (six) hours as needed for Itching.    DULoxetine (CYMBALTA) 20 MG capsule TAKE 1 CAPSULE BY MOUTH EVERY DAY    fexofenadine (ALLEGRA) 60 MG tablet Take 180 mg by mouth every morning.     fish oil-omega-3 fatty acids 300-1,000 mg capsule Take 1 g by mouth once daily.     fluticasone (FLONASE) 50 mcg/actuation nasal spray 1 spray by Each Nare route every morning.     folic acid/multivit-min/lutein (CENTRUM SILVER ORAL) Take by mouth.    furosemide (LASIX) 40 MG tablet TAKE 1 TABLET BY MOUTH EVERY DAY    gabapentin (NEURONTIN) 300 MG capsule Take 2 capsules (600 mg total) by mouth 3 (three) times daily. NO FURTHER REFILL WITHOUT APT    isosorbide mononitrate (IMDUR) 30 MG 24 hr tablet Take 1 tablet (30 mg total) by mouth once daily.    lisinopriL (PRINIVIL,ZESTRIL) 20 MG tablet Take 2 tablets (40 mg total) by mouth once daily.    lorazepam (ATIVAN) 0.5 MG tablet Take 0.5 mg by mouth every morning.     metFORMIN (GLUCOPHAGE) 500 MG tablet Take 500 mg by mouth 2 (two) times daily.    nitroGLYCERIN (NITROSTAT) 0.4 MG SL tablet Place 1 tablet (0.4 mg total) under the tongue every 5 (five) minutes as needed for Chest pain.    ondansetron (ZOFRAN-ODT) 4 MG TbDL DISSOLVE 1 TABLET ON TONGUE EVERY 8 HOURS AS NEEDED FOR NAUSEA    oxybutynin (DITROPAN-XL) 5 MG TR24 Take 5 mg by mouth every other day.     polyethylene glycol (GLYCOLAX) 17 gram PwPk Take by mouth.    POLYSORBATE 80/GLYCERIN (REFRESH DRY EYE THERAPY OPHT) Apply to eye 2 (two) times daily.    potassium gluconate 550 mg (90 mg) Tab Take by mouth. 2 in am and 2 pm    predniSONE (DELTASONE) 50 MG Tab Take 1 tablet (50 mg total) by mouth once daily.    vitamin D (VITAMIN D3) 1000 units Tab Take  2,000 Units by mouth once daily.    zafirlukast (ACCOLATE) 20 MG tablet Take 20 mg by mouth once daily.     Family History     Problem Relation (Age of Onset)    Heart attack Mother    Heart failure Brother    Stroke Father        Tobacco Use    Smoking status: Never Smoker    Smokeless tobacco: Never Used   Substance and Sexual Activity    Alcohol use: No    Drug use: No    Sexual activity: Not on file     Review of Systems   Constitution: Negative for chills and decreased appetite.   HENT: Negative for congestion, ear discharge and ear pain.    Eyes: Negative for blurred vision and discharge.   Cardiovascular: Positive for dyspnea on exertion, irregular heartbeat, leg swelling and palpitations. Negative for chest pain, claudication and cyanosis.   Respiratory: Positive for cough and shortness of breath. Negative for hemoptysis.    Endocrine: Negative for cold intolerance and heat intolerance.   Skin: Negative for color change and nail changes.   Musculoskeletal: Negative for arthritis and back pain.   Gastrointestinal: Negative for bloating and abdominal pain.   Genitourinary: Negative for bladder incontinence and decreased libido.   Neurological: Negative for aphonia and brief paralysis.     Objective:     Vital Signs (Most Recent):  Temp: 98.2 °F (36.8 °C) (11/10/20 1033)  Pulse: 84 (11/10/20 1322)  Resp: 16 (11/10/20 1305)  BP: (!) 153/66 (11/10/20 1321)  SpO2: 95 % (11/10/20 1322) Vital Signs (24h Range):  Temp:  [98.2 °F (36.8 °C)] 98.2 °F (36.8 °C)  Pulse:  [84-92] 84  Resp:  [16-18] 16  SpO2:  [95 %-98 %] 95 %  BP: (153-164)/(66-68) 153/66     Weight: 106.6 kg (235 lb)  Body mass index is 40.34 kg/m².    SpO2: 95 %       No intake or output data in the 24 hours ending 11/10/20 1451    Lines/Drains/Airways     None                 Physical Exam   Constitutional: She is oriented to person, place, and time. She appears well-developed and well-nourished.   HENT:   Head: Normocephalic and atraumatic.    Nose: Nose normal.   Mouth/Throat: No oropharyngeal exudate.   Eyes: Right eye exhibits no discharge. Left eye exhibits no discharge. No scleral icterus.   Neck: Normal range of motion. Neck supple.   Cardiovascular: S1 normal and S2 normal. Exam reveals no gallop, no S3, no S4, no distant heart sounds, no friction rub, no midsystolic click and no opening snap.   No murmur heard.  Pulses:       Radial pulses are 2+ on the right side and 2+ on the left side.        Femoral pulses are 2+ on the right side and 2+ on the left side.  Normal rate, irregularly irregular rhythm.   Pulmonary/Chest: Effort normal. No respiratory distress. She has wheezes. She has rales. She exhibits no tenderness.   Abdominal: Soft. Bowel sounds are normal. She exhibits no distension. There is no abdominal tenderness. There is no rebound.   Musculoskeletal: Normal range of motion.         General: No tenderness or deformity.      Comments: BL LE edema.   Lymphadenopathy:     She has no cervical adenopathy.   Neurological: She is alert and oriented to person, place, and time. No cranial nerve deficit.   Skin: Skin is warm and dry.   Psychiatric: She has a normal mood and affect. Her behavior is normal.       Significant Labs: All pertinent lab results from the last 24 hours have been reviewed.    Significant Imaging: All pertinent images from the last 24h have been reviewed.    Assessment and Plan:     Acute on chronic diastolic (congestive) heart failure  HFpEF exacerbation in the setting of Afib and uncontrolled asthma  Troponins negative x2, EKG w AF  BNP elevated at 121 (BMI 40)  Recommendations:  -Give lasix 80 IV once and monitor urine output, followed by lasix 80 IV BID until euvolemic  -Replace electrolytes to keep K > 4, Phos > 3, and Mg >2  -Strict I/Os  -Daily weights  -Cardiac diet  -Can admit to hospital medicine  -If nebs are given, prefer levalbuterol rather than albuterol    Discussed with Dr. Faria        VTE Risk  Mitigation (From admission, onward)    None          Sharan Anderson MD  Cardiology   [facility]

## 2020-11-12 ENCOUNTER — OFFICE VISIT (OUTPATIENT)
Dept: CARDIOLOGY | Facility: CLINIC | Age: 75
End: 2020-11-12
Payer: MEDICARE

## 2020-11-12 VITALS
BODY MASS INDEX: 40.02 KG/M2 | OXYGEN SATURATION: 97 % | SYSTOLIC BLOOD PRESSURE: 116 MMHG | WEIGHT: 234.44 LBS | HEART RATE: 76 BPM | HEIGHT: 64 IN | DIASTOLIC BLOOD PRESSURE: 67 MMHG

## 2020-11-12 DIAGNOSIS — E87.1 HYPONATREMIA: ICD-10-CM

## 2020-11-12 DIAGNOSIS — E78.00 PURE HYPERCHOLESTEROLEMIA: ICD-10-CM

## 2020-11-12 DIAGNOSIS — R60.0 LOCALIZED EDEMA: ICD-10-CM

## 2020-11-12 DIAGNOSIS — I48.20 CHRONIC ATRIAL FIBRILLATION: ICD-10-CM

## 2020-11-12 DIAGNOSIS — I25.10 CORONARY ARTERY DISEASE INVOLVING NATIVE CORONARY ARTERY OF NATIVE HEART WITHOUT ANGINA PECTORIS: ICD-10-CM

## 2020-11-12 DIAGNOSIS — I50.32 CHRONIC DIASTOLIC CHF (CONGESTIVE HEART FAILURE): ICD-10-CM

## 2020-11-12 DIAGNOSIS — K55.1 MESENTERIC ARTERY INSUFFICIENCY: ICD-10-CM

## 2020-11-12 DIAGNOSIS — R07.9 CHEST PAIN, UNSPECIFIED TYPE: ICD-10-CM

## 2020-11-12 DIAGNOSIS — K21.9 GASTROESOPHAGEAL REFLUX DISEASE, UNSPECIFIED WHETHER ESOPHAGITIS PRESENT: ICD-10-CM

## 2020-11-12 DIAGNOSIS — Z95.2 S/P TAVR (TRANSCATHETER AORTIC VALVE REPLACEMENT): ICD-10-CM

## 2020-11-12 DIAGNOSIS — I48.19 PERSISTENT ATRIAL FIBRILLATION: Primary | ICD-10-CM

## 2020-11-12 PROCEDURE — 1157F PR ADVANCE CARE PLAN OR EQUIV PRESENT IN MEDICAL RECORD: ICD-10-PCS | Mod: S$GLB,,, | Performed by: INTERNAL MEDICINE

## 2020-11-12 PROCEDURE — 3074F SYST BP LT 130 MM HG: CPT | Mod: CPTII,S$GLB,, | Performed by: INTERNAL MEDICINE

## 2020-11-12 PROCEDURE — 3288F PR FALLS RISK ASSESSMENT DOCUMENTED: ICD-10-PCS | Mod: CPTII,S$GLB,, | Performed by: INTERNAL MEDICINE

## 2020-11-12 PROCEDURE — 93000 ELECTROCARDIOGRAM COMPLETE: CPT | Mod: S$GLB,,, | Performed by: INTERNAL MEDICINE

## 2020-11-12 PROCEDURE — 1159F MED LIST DOCD IN RCRD: CPT | Mod: S$GLB,,, | Performed by: INTERNAL MEDICINE

## 2020-11-12 PROCEDURE — 99999 PR PBB SHADOW E&M-EST. PATIENT-LVL III: CPT | Mod: PBBFAC,,, | Performed by: INTERNAL MEDICINE

## 2020-11-12 PROCEDURE — 1157F ADVNC CARE PLAN IN RCRD: CPT | Mod: S$GLB,,, | Performed by: INTERNAL MEDICINE

## 2020-11-12 PROCEDURE — 3078F PR MOST RECENT DIASTOLIC BLOOD PRESSURE < 80 MM HG: ICD-10-PCS | Mod: CPTII,S$GLB,, | Performed by: INTERNAL MEDICINE

## 2020-11-12 PROCEDURE — 1101F PR PT FALLS ASSESS DOC 0-1 FALLS W/OUT INJ PAST YR: ICD-10-PCS | Mod: CPTII,S$GLB,, | Performed by: INTERNAL MEDICINE

## 2020-11-12 PROCEDURE — 99214 OFFICE O/P EST MOD 30 MIN: CPT | Mod: 25,S$GLB,, | Performed by: INTERNAL MEDICINE

## 2020-11-12 PROCEDURE — 3074F PR MOST RECENT SYSTOLIC BLOOD PRESSURE < 130 MM HG: ICD-10-PCS | Mod: CPTII,S$GLB,, | Performed by: INTERNAL MEDICINE

## 2020-11-12 PROCEDURE — 3288F FALL RISK ASSESSMENT DOCD: CPT | Mod: CPTII,S$GLB,, | Performed by: INTERNAL MEDICINE

## 2020-11-12 PROCEDURE — 99999 PR PBB SHADOW E&M-EST. PATIENT-LVL III: ICD-10-PCS | Mod: PBBFAC,,, | Performed by: INTERNAL MEDICINE

## 2020-11-12 PROCEDURE — 1101F PT FALLS ASSESS-DOCD LE1/YR: CPT | Mod: CPTII,S$GLB,, | Performed by: INTERNAL MEDICINE

## 2020-11-12 PROCEDURE — 93000 EKG 12-LEAD: ICD-10-PCS | Mod: S$GLB,,, | Performed by: INTERNAL MEDICINE

## 2020-11-12 PROCEDURE — 1159F PR MEDICATION LIST DOCUMENTED IN MEDICAL RECORD: ICD-10-PCS | Mod: S$GLB,,, | Performed by: INTERNAL MEDICINE

## 2020-11-12 PROCEDURE — 3078F DIAST BP <80 MM HG: CPT | Mod: CPTII,S$GLB,, | Performed by: INTERNAL MEDICINE

## 2020-11-12 PROCEDURE — 99214 PR OFFICE/OUTPT VISIT, EST, LEVL IV, 30-39 MIN: ICD-10-PCS | Mod: 25,S$GLB,, | Performed by: INTERNAL MEDICINE

## 2020-11-12 NOTE — PROGRESS NOTES
Subjective:      Patient ID: Adriana Strong is a 75 y.o. female.    Chief Complaint: Hospital Follow Up    HPI:  Pt has been hospitalized on multiple occasions since last visit:  A fib  Fluid  Asthma  Chest pain    Pt sees Dr Bee for a fib    Diuretic was doubled due to edema    Pt required steroids for asthma    Pt is followed by Dr boo Hilton and has PFT's scheduled    Pt has a f/u CT scheduled for mesenteric stent    Pt feels better.    Pt still has palpitations and squeezing chest tightness and shortness of breath    Legs swell.    Pt is able to walk short distances.    Arms and legs are weak     Pt walks with a walker in the house    Review of Systems   Eyes: Negative for discharge.   Cardiovascular: Positive for chest pain, dyspnea on exertion, leg swelling and palpitations. Negative for claudication, irregular heartbeat, near-syncope, orthopnea and syncope.        Past Medical History:   Diagnosis Date    Acute on chronic diastolic (congestive) heart failure 11/20/2019    Anticoagulant long-term use     on Plavix since May 2015    Anxiety     Arthritis     Asthma     Atrial fibrillation     Cataracts, bilateral     Chest pain, atypical     Colon polyp     Coronary artery disease     Diabetes mellitus     Dry eyes     Esophageal erosions     GERD (gastroesophageal reflux disease)     Heart murmur     Hemorrhoid     High cholesterol     Hypertension     Irritable bowel syndrome     Shingles 2015    Stenosis of aortic and mitral valves     Stroke     TIA (transient ischemic attack)     Use of cane as ambulatory aid         Past Surgical History:   Procedure Laterality Date    ABDOMINAL SURGERY      stents to SMA    angiocele      2007, 2014    AORTIC VALVE REPLACEMENT N/A 11/19/2019    Procedure: Replacement-valve-aortic;  Surgeon: Jack Capone MD;  Location: [facility] CATH LAB;  Service: Cardiology;  Laterality: N/A;    BREAST SURGERY      left--- a lump--- no cancer     CARDIAC VALVE SURGERY      COLONOSCOPY  2014    COLONOSCOPY N/A 3/14/2018    Procedure: COLONOSCOPY;  Surgeon: Efren Kemp MD;  Location: [facility] ([address] [place] [address]);  Service: Endoscopy;  Laterality: N/A;  ok to hold Plavix 5 days prior to procedure per Dr RESHMA Hernandez     ok per Dr Kemp to hold Eliquis 2 days prior to procedure (see telephone encounter dated-2/7/18)    HERNIA REPAIR      HYSTERECTOMY  partial    1982 partial hysterectomy    LEFT HEART CATHETERIZATION Right 11/5/2019    Procedure: Left heart cath;  Surgeon: Jack Capone MD;  Location: [facility] CATH LAB;  Service: Cardiology;  Laterality: Right;    left nasal polyp      left neck lymph node      nose polyp      right hip fatty mass tissue      stent to small intestine      SUPERIOR VENA CAVA ANGIOPLASTY / STENTING      TONSILLECTOMY      TOTAL ABDOMINAL HYSTERECTOMY      2014    TOTAL KNEE ARTHROPLASTY Bilateral     TREATMENT OF CARDIAC ARRHYTHMIA N/A 2/10/2020    Procedure: CARDIOVERSION;  Surgeon: Jayy Parrish MD;  Location: [facility] EP LAB;  Service: Cardiology;  Laterality: N/A;  AF, PEDRO, DCCV, MAC, DM, 3 Prep    TREATMENT OF CARDIAC ARRHYTHMIA N/A 5/15/2020    Procedure: CARDIOVERSION;  Surgeon: Hany Bee MD;  Location: [facility] EP LAB;  Service: Cardiology;  Laterality: N/A;  AF, PEDRO, DCCV, MAC, DM, 3 Prep    UPPER GASTROINTESTINAL ENDOSCOPY  2014       Family History   Problem Relation Age of Onset    Heart attack Mother     Stroke Father     Heart failure Brother     Colon cancer Neg Hx     Inflammatory bowel disease Neg Hx        Social History     Socioeconomic History    Marital status:      Spouse name: Not on file    Number of children: Not on file    Years of education: Not on file    Highest education level: Not on file   Occupational History    Not on file   Social Needs    Financial resource strain: Not on file    Food insecurity     Worry: Not on file     Inability: Not on file    Transportation needs      Medical: Not on file     Non-medical: Not on file   Tobacco Use    Smoking status: Never Smoker    Smokeless tobacco: Never Used   Substance and Sexual Activity    Alcohol use: No    Drug use: No    Sexual activity: Not on file   Lifestyle    Physical activity     Days per week: Not on file     Minutes per session: Not on file    Stress: Not on file   Relationships    Social connections     Talks on phone: Not on file     Gets together: Not on file     Attends Jainism service: Not on file     Active member of club or organization: Not on file     Attends meetings of clubs or organizations: Not on file     Relationship status: Not on file   Other Topics Concern    Not on file   Social History Narrative    Not on file       Current Outpatient Medications on File Prior to Visit   Medication Sig Dispense Refill    acetaminophen (TYLENOL ARTHRITIS) 650 MG TbSR Take 1,300 mg by mouth 2 (two) times daily.      albuterol (PROVENTIL) 2.5 mg /3 mL (0.083 %) nebulizer solution USE 1 VIAL IN NEBULIZER EVERY 4 6 HOURS AS NEEDED      albuterol-ipratropium (DUO-NEB) 2.5 mg-0.5 mg/3 mL nebulizer solution NEBULIZE 1 VIAL EVERY 4 HOURS AS NEEDED      apixaban (ELIQUIS) 5 mg Tab Take 1 tablet (5 mg total) by mouth 2 (two) times daily. 180 tablet 3    ascorbic acid (VITAMIN C) 500 MG tablet Take 1,000 mg by mouth once daily.       atorvastatin (LIPITOR) 10 MG tablet Take 1 tablet (10 mg total) by mouth once daily. 90 tablet 3    azelastine (ASTELIN) 137 mcg nasal spray 1 spray by Nasal route 2 (two) times daily.      bisacodyl (DULCOLAX) 5 mg EC tablet Take 5 mg by mouth daily as needed for Constipation.      budesonide-formoterol 160-4.5 mcg (SYMBICORT) 160-4.5 mcg/actuation HFAA Inhale 2 puffs into the lungs every 12 (twelve) hours. Controller      calcium carbonate (OS-MICHAEL) 600 mg (1,500 mg) Tab Take 1,200 mg by mouth once daily.      calcium carbonate/vitamin D3 (CALTRATE 600 + D ORAL) Take by mouth.       clopidogreL (PLAVIX) 75 mg tablet Take 1 tablet (75 mg total) by mouth once daily. 90 tablet 3    cyanocobalamin (VITAMIN B-12) 1000 MCG tablet Take 100 mcg by mouth once daily.      diltiaZEM (TIAZAC) 360 MG Cs24 Take 1 capsule (360 mg total) by mouth once daily. 30 capsule 11    diphenhydrAMINE (BENADRYL) 50 MG capsule Take 1 capsule (50 mg total) by mouth every 6 (six) hours as needed for Itching. 1 capsule 0    DULoxetine (CYMBALTA) 20 MG capsule TAKE 1 CAPSULE BY MOUTH EVERY DAY 90 capsule 3    fexofenadine (ALLEGRA) 60 MG tablet Take 180 mg by mouth every morning.       fish oil-omega-3 fatty acids 300-1,000 mg capsule Take 1 g by mouth once daily.       fluticasone (FLONASE) 50 mcg/actuation nasal spray 1 spray by Each Nare route every morning.       folic acid/multivit-min/lutein (CENTRUM SILVER ORAL) Take by mouth.      furosemide (LASIX) 40 MG tablet Take 1 tablet (40 mg total) by mouth 2 (two) times a day. for 3 days 6 tablet 0    gabapentin (NEURONTIN) 300 MG capsule Take 2 capsules (600 mg total) by mouth 3 (three) times daily. NO FURTHER REFILL WITHOUT  capsule 0    isosorbide mononitrate (IMDUR) 30 MG 24 hr tablet Take 1 tablet (30 mg total) by mouth once daily. 90 tablet 3    lisinopriL (PRINIVIL,ZESTRIL) 20 MG tablet Take 2 tablets (40 mg total) by mouth once daily. 180 tablet 3    lorazepam (ATIVAN) 0.5 MG tablet Take 0.5 mg by mouth every morning.       metFORMIN (GLUCOPHAGE) 500 MG tablet Take 500 mg by mouth 2 (two) times daily.  1    nitroGLYCERIN (NITROSTAT) 0.4 MG SL tablet Place 1 tablet (0.4 mg total) under the tongue every 5 (five) minutes as needed for Chest pain. 30 tablet 11    ondansetron (ZOFRAN-ODT) 4 MG TbDL DISSOLVE 1 TABLET ON TONGUE EVERY 8 HOURS AS NEEDED FOR NAUSEA  1    oxybutynin (DITROPAN-XL) 5 MG TR24 Take 5 mg by mouth every other day.       polyethylene glycol (GLYCOLAX) 17 gram PwPk Take by mouth.      POLYSORBATE 80/GLYCERIN (REFRESH DRY EYE  "THERAPY OPHT) Apply to eye 2 (two) times daily.      potassium gluconate 550 mg (90 mg) Tab Take by mouth. 2 in am and 2 pm      vitamin D (VITAMIN D3) 1000 units Tab Take 2,000 Units by mouth once daily.      zafirlukast (ACCOLATE) 20 MG tablet Take 20 mg by mouth once daily.  3    predniSONE (DELTASONE) 50 MG Tab Take 1 tablet (50 mg total) by mouth once daily. 3 tablet 0     Current Facility-Administered Medications on File Prior to Visit   Medication Dose Route Frequency Provider Last Rate Last Dose    0.9%  NaCl infusion   Intravenous Continuous Lynette Hightower NP        sodium chloride 0.9% flush 5 mL  5 mL Intravenous PRN Lynette Hightower NP           Review of patient's allergies indicates:   Allergen Reactions    Celebrex [celecoxib] Shortness Of Breath    Ciprofloxacin Swelling     lip    Dicyclomine Hives    Adhesive Dermatitis    Avelox [moxifloxacin] Swelling    Cilostazol Other (See Comments)     Elevates blood pressure    Eryc [erythromycin] Other (See Comments)     unknown    Iodine and iodide containing products Hives    Keflex [cephalexin] Hives    Meclomen      rashes    Meloxicam      Ear ringing    Metoclopramide Other (See Comments)     High blood pressure    Sulfa (sulfonamide antibiotics) Itching     Objective:     Vitals:    11/12/20 1048   BP: 116/67   BP Location: Left arm   Patient Position: Sitting   BP Method: Thigh Cuff (Automatic)   Pulse: 76   SpO2: 97%   Weight: 106.4 kg (234 lb 7.4 oz)   Height: 5' 4" (1.626 m)        Physical Exam   Constitutional: She is oriented to person, place, and time. She appears well-developed and well-nourished.   Eyes: No scleral icterus.   Neck: No JVD present. Carotid bruit is not present.   Cardiovascular: Normal rate. An irregularly irregular rhythm present. Exam reveals no gallop.   Murmur (II/VI systolic) heard.  Pulmonary/Chest: Breath sounds normal.   Musculoskeletal:         General: Edema (trace bilateral pedal) " present.   Neurological: She is alert and oriented to person, place, and time.   Skin: Skin is warm and dry.   Psychiatric: She has a normal mood and affect. Her behavior is normal. Judgment and thought content normal.   Vitals reviewed.     NOte echocardiogram 5/20 showed normal LVEF and normal TAVR function    CXR 11/10/20 showed no CHF    Admission on 11/10/2020, Discharged on 11/10/2020   Component Date Value Ref Range Status    WBC 11/10/2020 7.01  3.90 - 12.70 K/uL Final    RBC 11/10/2020 3.92* 4.00 - 5.40 M/uL Final    Hemoglobin 11/10/2020 12.2  12.0 - 16.0 g/dL Final    Hematocrit 11/10/2020 37.6  37.0 - 48.5 % Final    MCV 11/10/2020 96  82 - 98 fL Final    MCH 11/10/2020 31.1* 27.0 - 31.0 pg Final    MCHC 11/10/2020 32.4  32.0 - 36.0 g/dL Final    RDW 11/10/2020 14.9* 11.5 - 14.5 % Final    Platelets 11/10/2020 237  150 - 350 K/uL Final    MPV 11/10/2020 9.5  9.2 - 12.9 fL Final    Immature Granulocytes 11/10/2020 0.6* 0.0 - 0.5 % Final    Gran # (ANC) 11/10/2020 4.1  1.8 - 7.7 K/uL Final    Immature Grans (Abs) 11/10/2020 0.04  0.00 - 0.04 K/uL Final    Lymph # 11/10/2020 2.0  1.0 - 4.8 K/uL Final    Mono # 11/10/2020 0.8  0.3 - 1.0 K/uL Final    Eos # 11/10/2020 0.0  0.0 - 0.5 K/uL Final    Baso # 11/10/2020 0.03  0.00 - 0.20 K/uL Final    nRBC 11/10/2020 0  0 /100 WBC Final    Gran % 11/10/2020 58.9  38.0 - 73.0 % Final    Lymph % 11/10/2020 28.8  18.0 - 48.0 % Final    Mono % 11/10/2020 11.0  4.0 - 15.0 % Final    Eosinophil % 11/10/2020 0.3  0.0 - 8.0 % Final    Basophil % 11/10/2020 0.4  0.0 - 1.9 % Final    Differential Method 11/10/2020 Automated   Final    Sodium 11/10/2020 132* 136 - 145 mmol/L Final    Potassium 11/10/2020 4.2  3.5 - 5.1 mmol/L Final    Chloride 11/10/2020 92* 95 - 110 mmol/L Final    CO2 11/10/2020 29  23 - 29 mmol/L Final    Glucose 11/10/2020 100  70 - 110 mg/dL Final    BUN 11/10/2020 17  8 - 23 mg/dL Final    Creatinine 11/10/2020 0.9  0.5 -  1.4 mg/dL Final    Calcium 11/10/2020 9.8  8.7 - 10.5 mg/dL Final    Total Protein 11/10/2020 6.5  6.0 - 8.4 g/dL Final    Albumin 11/10/2020 3.3* 3.5 - 5.2 g/dL Final    Total Bilirubin 11/10/2020 0.3  0.1 - 1.0 mg/dL Final    Alkaline Phosphatase 11/10/2020 43* 55 - 135 U/L Final    AST 11/10/2020 23  10 - 40 U/L Final    ALT 11/10/2020 30  10 - 44 U/L Final    Anion Gap 11/10/2020 11  8 - 16 mmol/L Final    eGFR if African American 11/10/2020 >60.0  >60 mL/min/1.73 m^2 Final    eGFR if non African American 11/10/2020 >60.0  >60 mL/min/1.73 m^2 Final    Troponin I 11/10/2020 <0.006  0.000 - 0.026 ng/mL Final    Troponin I 11/10/2020 <0.006  0.000 - 0.026 ng/mL Final    BNP 11/10/2020 121* 0 - 99 pg/mL Final    Magnesium 11/10/2020 1.6  1.6 - 2.6 mg/dL Final    TSH 11/10/2020 1.369  0.400 - 4.000 uIU/mL Final    POC Rapid COVID 11/10/2020 Negative  Negative Final     Acceptable 11/10/2020 Yes   Final    Troponin I 11/10/2020 <0.006  0.000 - 0.026 ng/mL Final   Admission on 10/29/2020, Discharged on 10/30/2020   Component Date Value Ref Range Status    WBC 10/29/2020 17.56* 3.90 - 12.70 K/uL Final    RBC 10/29/2020 4.67  4.00 - 5.40 M/uL Final    Hemoglobin 10/29/2020 14.6  12.0 - 16.0 g/dL Final    Hematocrit 10/29/2020 42.5  37.0 - 48.5 % Final    MCV 10/29/2020 91  82 - 98 fL Final    MCH 10/29/2020 31.3* 27.0 - 31.0 pg Final    MCHC 10/29/2020 34.4  32.0 - 36.0 g/dL Final    RDW 10/29/2020 14.2  11.5 - 14.5 % Final    Platelets 10/29/2020 265  150 - 350 K/uL Final    MPV 10/29/2020 9.9  9.2 - 12.9 fL Final    Immature Granulocytes 10/29/2020 2.6* 0.0 - 0.5 % Final    Gran # (ANC) 10/29/2020 15.8* 1.8 - 7.7 K/uL Final    Immature Grans (Abs) 10/29/2020 0.45* 0.00 - 0.04 K/uL Final    Lymph # 10/29/2020 0.9* 1.0 - 4.8 K/uL Final    Mono # 10/29/2020 0.4  0.3 - 1.0 K/uL Final    Eos # 10/29/2020 0.0  0.0 - 0.5 K/uL Final    Baso # 10/29/2020 0.03  0.00 - 0.20 K/uL  Final    nRBC 10/29/2020 0  0 /100 WBC Final    Gran % 10/29/2020 89.8* 38.0 - 73.0 % Final    Lymph % 10/29/2020 5.2* 18.0 - 48.0 % Final    Mono % 10/29/2020 2.2* 4.0 - 15.0 % Final    Eosinophil % 10/29/2020 0.0  0.0 - 8.0 % Final    Basophil % 10/29/2020 0.2  0.0 - 1.9 % Final    Differential Method 10/29/2020 Automated   Final    Sodium 10/29/2020 129* 136 - 145 mmol/L Final    Potassium 10/29/2020 4.7  3.5 - 5.1 mmol/L Final    Chloride 10/29/2020 90* 95 - 110 mmol/L Final    CO2 10/29/2020 26  23 - 29 mmol/L Final    Glucose 10/29/2020 305* 70 - 110 mg/dL Final    BUN 10/29/2020 21  8 - 23 mg/dL Final    Creatinine 10/29/2020 1.0  0.5 - 1.4 mg/dL Final    Calcium 10/29/2020 8.8  8.7 - 10.5 mg/dL Final    Total Protein 10/29/2020 6.7  6.0 - 8.4 g/dL Final    Albumin 10/29/2020 3.9  3.5 - 5.2 g/dL Final    Total Bilirubin 10/29/2020 0.6  0.1 - 1.0 mg/dL Final    Alkaline Phosphatase 10/29/2020 40* 55 - 135 U/L Final    AST 10/29/2020 28  10 - 40 U/L Final    ALT 10/29/2020 58* 10 - 44 U/L Final    Anion Gap 10/29/2020 13  8 - 16 mmol/L Final    eGFR if African American 10/29/2020 >60  >60 mL/min/1.73 m^2 Final    eGFR if non African American 10/29/2020 54* >60 mL/min/1.73 m^2 Final    Troponin I 10/29/2020 0.008  0.000 - 0.026 ng/mL Final    BNP 10/29/2020 59  0 - 99 pg/mL Final    SARS-CoV-2 RNA, Amplification, Qual 10/29/2020 Negative  Negative Final    Troponin I 10/29/2020 <0.006  0.000 - 0.026 ng/mL Final    POCT Glucose 10/29/2020 195* 70 - 110 mg/dL Final    Troponin I 10/29/2020 0.009  0.000 - 0.026 ng/mL Final    POCT Glucose 10/29/2020 132* 70 - 110 mg/dL Final    WBC 10/30/2020 15.67* 3.90 - 12.70 K/uL Final    RBC 10/30/2020 4.32  4.00 - 5.40 M/uL Final    Hemoglobin 10/30/2020 13.5  12.0 - 16.0 g/dL Final    Hematocrit 10/30/2020 40.0  37.0 - 48.5 % Final    MCV 10/30/2020 93  82 - 98 fL Final    MCH 10/30/2020 31.3* 27.0 - 31.0 pg Final    MCHC 10/30/2020 33.8   32.0 - 36.0 g/dL Final    RDW 10/30/2020 14.6* 11.5 - 14.5 % Final    Platelets 10/30/2020 221  150 - 350 K/uL Final    MPV 10/30/2020 9.8  9.2 - 12.9 fL Final    Immature Granulocytes 10/30/2020 1.6* 0.0 - 0.5 % Final    Gran # (ANC) 10/30/2020 9.6* 1.8 - 7.7 K/uL Final    Immature Grans (Abs) 10/30/2020 0.25* 0.00 - 0.04 K/uL Final    Lymph # 10/30/2020 4.7  1.0 - 4.8 K/uL Final    Mono # 10/30/2020 1.0  0.3 - 1.0 K/uL Final    Eos # 10/30/2020 0.0  0.0 - 0.5 K/uL Final    Baso # 10/30/2020 0.03  0.00 - 0.20 K/uL Final    nRBC 10/30/2020 0  0 /100 WBC Final    Gran % 10/30/2020 61.4  38.0 - 73.0 % Final    Lymph % 10/30/2020 30.1  18.0 - 48.0 % Final    Mono % 10/30/2020 6.4  4.0 - 15.0 % Final    Eosinophil % 10/30/2020 0.3  0.0 - 8.0 % Final    Basophil % 10/30/2020 0.2  0.0 - 1.9 % Final    Differential Method 10/30/2020 Automated   Final    Sodium 10/30/2020 136  136 - 145 mmol/L Final    Potassium 10/30/2020 4.1  3.5 - 5.1 mmol/L Final    Chloride 10/30/2020 98  95 - 110 mmol/L Final    CO2 10/30/2020 30* 23 - 29 mmol/L Final    Glucose 10/30/2020 109  70 - 110 mg/dL Final    BUN 10/30/2020 17  8 - 23 mg/dL Final    Creatinine 10/30/2020 0.8  0.5 - 1.4 mg/dL Final    Calcium 10/30/2020 8.8  8.7 - 10.5 mg/dL Final    Total Protein 10/30/2020 5.8* 6.0 - 8.4 g/dL Final    Albumin 10/30/2020 3.3* 3.5 - 5.2 g/dL Final    Total Bilirubin 10/30/2020 0.6  0.1 - 1.0 mg/dL Final    Alkaline Phosphatase 10/30/2020 32* 55 - 135 U/L Final    AST 10/30/2020 21  10 - 40 U/L Final    ALT 10/30/2020 44  10 - 44 U/L Final    Anion Gap 10/30/2020 8  8 - 16 mmol/L Final    eGFR if African American 10/30/2020 >60  >60 mL/min/1.73 m^2 Final    eGFR if non African American 10/30/2020 >60  >60 mL/min/1.73 m^2 Final    POCT Glucose 10/30/2020 110  70 - 110 mg/dL Final    POCT Glucose 10/30/2020 132* 70 - 110 mg/dL Final   Admission on 10/19/2020, Discharged on 10/21/2020   Component Date Value Ref  Range Status    aPTT 10/19/2020 36.2* 21.0 - 32.0 sec Final    WBC 10/19/2020 19.51* 3.90 - 12.70 K/uL Final    RBC 10/19/2020 4.80  4.00 - 5.40 M/uL Final    Hemoglobin 10/19/2020 15.0  12.0 - 16.0 g/dL Final    Hematocrit 10/19/2020 44.0  37.0 - 48.5 % Final    MCV 10/19/2020 92  82 - 98 fL Final    MCH 10/19/2020 31.3* 27.0 - 31.0 pg Final    MCHC 10/19/2020 34.1  32.0 - 36.0 g/dL Final    RDW 10/19/2020 14.4  11.5 - 14.5 % Final    Platelets 10/19/2020 360* 150 - 350 K/uL Final    MPV 10/19/2020 9.6  9.2 - 12.9 fL Final    Immature Granulocytes 10/19/2020 0.5  0.0 - 0.5 % Final    Gran # (ANC) 10/19/2020 12.5* 1.8 - 7.7 K/uL Final    Immature Grans (Abs) 10/19/2020 0.10* 0.00 - 0.04 K/uL Final    Lymph # 10/19/2020 5.0* 1.0 - 4.8 K/uL Final    Mono # 10/19/2020 1.8* 0.3 - 1.0 K/uL Final    Eos # 10/19/2020 0.0  0.0 - 0.5 K/uL Final    Baso # 10/19/2020 0.05  0.00 - 0.20 K/uL Final    nRBC 10/19/2020 0  0 /100 WBC Final    Gran % 10/19/2020 64.1  38.0 - 73.0 % Final    Lymph % 10/19/2020 25.7  18.0 - 48.0 % Final    Mono % 10/19/2020 9.3  4.0 - 15.0 % Final    Eosinophil % 10/19/2020 0.1  0.0 - 8.0 % Final    Basophil % 10/19/2020 0.3  0.0 - 1.9 % Final    Differential Method 10/19/2020 Automated   Final    Sodium 10/19/2020 134* 136 - 145 mmol/L Final    Potassium 10/19/2020 4.2  3.5 - 5.1 mmol/L Final    Chloride 10/19/2020 90* 95 - 110 mmol/L Final    CO2 10/19/2020 30* 23 - 29 mmol/L Final    Glucose 10/19/2020 96  70 - 110 mg/dL Final    BUN 10/19/2020 20  8 - 23 mg/dL Final    Creatinine 10/19/2020 0.9  0.5 - 1.4 mg/dL Final    Calcium 10/19/2020 10.1  8.7 - 10.5 mg/dL Final    Total Protein 10/19/2020 8.3  6.0 - 8.4 g/dL Final    Albumin 10/19/2020 4.3  3.5 - 5.2 g/dL Final    Total Bilirubin 10/19/2020 0.5  0.1 - 1.0 mg/dL Final    Alkaline Phosphatase 10/19/2020 47* 55 - 135 U/L Final    AST 10/19/2020 45* 10 - 40 U/L Final    ALT 10/19/2020 55* 10 - 44 U/L Final     Anion Gap 10/19/2020 14  8 - 16 mmol/L Final    eGFR if African American 10/19/2020 >60  >60 mL/min/1.73 m^2 Final    eGFR if non African American 10/19/2020 >60  >60 mL/min/1.73 m^2 Final    BNP 10/19/2020 90  0 - 99 pg/mL Final    Magnesium 10/19/2020 1.8  1.6 - 2.6 mg/dL Final    Troponin I 10/19/2020 0.008  0.000 - 0.026 ng/mL Final    Prothrombin Time 10/19/2020 10.9  9.0 - 12.5 sec Final    INR 10/19/2020 1.0  0.8 - 1.2 Final    SARS-CoV-2 RNA, Amplification, Qual 10/19/2020 Negative  Negative Final    Magnesium 10/19/2020 2.0  1.6 - 2.6 mg/dL Final    Respiratory Culture 10/20/2020 No S aureus or Pseudomonas isolated.   Final    Respiratory Culture 10/20/2020 *  Final                    Value:MORAXELLA (BRANHAMELLA) CATARRHALIS  Many  Beta Lactamase positive  Normal respiratory manuel also present      Gram Stain (Respiratory) 10/20/2020 <10 epithelial cells per low power field.   Final    Gram Stain (Respiratory) 10/20/2020 Rare WBC's   Final    Gram Stain (Respiratory) 10/20/2020 Moderate Gram positive cocci   Final    Gram Stain (Respiratory) 10/20/2020 Moderate Gram positive rods   Final    Gram Stain (Respiratory) 10/20/2020 Moderate Gram negative rods   Final    Gram Stain (Respiratory) 10/20/2020 Moderate Gram negative diplococci   Final    Gram Stain Result 10/21/2020 Rare WBC's   Final    Gram Stain Result 10/21/2020 Many Gram positive cocci    Final    Gram Stain Result 10/21/2020 Rare Gram negative rods   Final    Procalcitonin 10/19/2020 0.09  <0.25 ng/mL Final    Specimen UA 10/19/2020 Urine, Clean Catch   Final    Color, UA 10/19/2020 Yellow  Yellow, Straw, Carol Final    Appearance, UA 10/19/2020 Clear  Clear Final    pH, UA 10/19/2020 7.0  5.0 - 8.0 Final    Specific [place], UA 10/19/2020 1.015  1.005 - 1.030 Final    Protein, UA 10/19/2020 1+* Negative Final    Glucose, UA 10/19/2020 Negative  Negative Final    Ketones, UA 10/19/2020 Negative  Negative Final     Bilirubin (UA) 10/19/2020 Negative  Negative Final    Occult Blood UA 10/19/2020 Trace* Negative Final    Nitrite, UA 10/19/2020 Negative  Negative Final    Urobilinogen, UA 10/19/2020 Negative  <2.0 EU/dL Final    Leukocytes, UA 10/19/2020 1+* Negative Final    Specimen UA 10/19/2020 Urine, Clean Catch   Final    Color, UA 10/19/2020 Yellow  Yellow, Straw, Carol Final    Appearance, UA 10/19/2020 Clear  Clear Final    pH, UA 10/19/2020 7.0  5.0 - 8.0 Final    Specific [place], UA 10/19/2020 1.015  1.005 - 1.030 Final    Protein, UA 10/19/2020 1+* Negative Final    Glucose, UA 10/19/2020 Negative  Negative Final    Ketones, UA 10/19/2020 Negative  Negative Final    Bilirubin (UA) 10/19/2020 Negative  Negative Final    Occult Blood UA 10/19/2020 Trace* Negative Final    Nitrite, UA 10/19/2020 Negative  Negative Final    Urobilinogen, UA 10/19/2020 Negative  <2.0 EU/dL Final    Leukocytes, UA 10/19/2020 1+* Negative Final    Influenza A, Molecular 10/19/2020 Negative  Negative Final    Influenza B, Molecular 10/19/2020 Negative  Negative Final    Flu A & B Source 10/19/2020 Nasal swab   Final    RBC, UA 10/19/2020 3  0 - 4 /hpf Final    WBC, UA 10/19/2020 20* 0 - 5 /hpf Final    WBC Clumps, UA 10/19/2020 Occasional* None-Rare Final    Bacteria 10/19/2020 Moderate* None-Occ /hpf Final    Squam Epithel, UA 10/19/2020 4  /hpf Final    Hyaline Casts, UA 10/19/2020 0  0-1/lpf /lpf Final    Granular Casts, UA 10/19/2020 1* None /lpf Final    Microscopic Comment 10/19/2020 SEE COMMENT   Final    Urine Culture, Routine 10/19/2020 *  Final                    Value:KLEBSIELLA PNEUMONIAE  >100,000 cfu/ml      POCT Glucose 10/19/2020 213* 70 - 110 mg/dL Final    WBC 10/20/2020 13.99* 3.90 - 12.70 K/uL Final    RBC 10/20/2020 4.30  4.00 - 5.40 M/uL Final    Hemoglobin 10/20/2020 13.4  12.0 - 16.0 g/dL Final    Hematocrit 10/20/2020 39.5  37.0 - 48.5 % Final    MCV 10/20/2020 92  82 - 98 fL  Final    MCH 10/20/2020 31.2* 27.0 - 31.0 pg Final    MCHC 10/20/2020 33.9  32.0 - 36.0 g/dL Final    RDW 10/20/2020 14.6* 11.5 - 14.5 % Final    Platelets 10/20/2020 325  150 - 350 K/uL Final    MPV 10/20/2020 9.8  9.2 - 12.9 fL Final    Immature Granulocytes 10/20/2020 0.8* 0.0 - 0.5 % Final    Gran # (ANC) 10/20/2020 12.0* 1.8 - 7.7 K/uL Final    Immature Grans (Abs) 10/20/2020 0.11* 0.00 - 0.04 K/uL Final    Lymph # 10/20/2020 1.6  1.0 - 4.8 K/uL Final    Mono # 10/20/2020 0.3  0.3 - 1.0 K/uL Final    Eos # 10/20/2020 0.0  0.0 - 0.5 K/uL Final    Baso # 10/20/2020 0.01  0.00 - 0.20 K/uL Final    nRBC 10/20/2020 0  0 /100 WBC Final    Gran % 10/20/2020 85.5* 38.0 - 73.0 % Final    Lymph % 10/20/2020 11.7* 18.0 - 48.0 % Final    Mono % 10/20/2020 1.9* 4.0 - 15.0 % Final    Eosinophil % 10/20/2020 0.0  0.0 - 8.0 % Final    Basophil % 10/20/2020 0.1  0.0 - 1.9 % Final    Differential Method 10/20/2020 Automated   Final    Hemoglobin A1C 10/20/2020 5.9* 4.0 - 5.6 % Final    Estimated Avg Glucose 10/20/2020 123  68 - 131 mg/dL Final    POCT Glucose 10/20/2020 189* 70 - 110 mg/dL Final    POCT Glucose 10/20/2020 197* 70 - 110 mg/dL Final    POCT Glucose 10/20/2020 222* 70 - 110 mg/dL Final    POCT Glucose 10/20/2020 192* 70 - 110 mg/dL Final    Troponin I 10/20/2020 0.014  0.000 - 0.026 ng/mL Final    WBC 10/21/2020 16.45* 3.90 - 12.70 K/uL Final    RBC 10/21/2020 4.41  4.00 - 5.40 M/uL Final    Hemoglobin 10/21/2020 13.8  12.0 - 16.0 g/dL Final    Hematocrit 10/21/2020 40.2  37.0 - 48.5 % Final    MCV 10/21/2020 91  82 - 98 fL Final    MCH 10/21/2020 31.3* 27.0 - 31.0 pg Final    MCHC 10/21/2020 34.3  32.0 - 36.0 g/dL Final    RDW 10/21/2020 14.3  11.5 - 14.5 % Final    Platelets 10/21/2020 321  150 - 350 K/uL Final    MPV 10/21/2020 9.6  9.2 - 12.9 fL Final    Immature Granulocytes 10/21/2020 0.9* 0.0 - 0.5 % Final    Gran # (ANC) 10/21/2020 14.6* 1.8 - 7.7 K/uL Final     Immature Grans (Abs) 10/21/2020 0.14* 0.00 - 0.04 K/uL Final    Lymph # 10/21/2020 1.1  1.0 - 4.8 K/uL Final    Mono # 10/21/2020 0.6  0.3 - 1.0 K/uL Final    Eos # 10/21/2020 0.0  0.0 - 0.5 K/uL Final    Baso # 10/21/2020 0.01  0.00 - 0.20 K/uL Final    nRBC 10/21/2020 0  0 /100 WBC Final    Gran % 10/21/2020 88.6* 38.0 - 73.0 % Final    Lymph % 10/21/2020 6.6* 18.0 - 48.0 % Final    Mono % 10/21/2020 3.8* 4.0 - 15.0 % Final    Eosinophil % 10/21/2020 0.0  0.0 - 8.0 % Final    Basophil % 10/21/2020 0.1  0.0 - 1.9 % Final    Differential Method 10/21/2020 Automated   Final    POCT Glucose 10/21/2020 180* 70 - 110 mg/dL Final    POCT Glucose 10/21/2020 172* 70 - 110 mg/dL Final   Lab Visit on 10/05/2020   Component Date Value Ref Range Status    Sodium 10/05/2020 136  136 - 145 mmol/L Final    Potassium 10/05/2020 4.5  3.5 - 5.1 mmol/L Final    Chloride 10/05/2020 95  95 - 110 mmol/L Final    CO2 10/05/2020 35* 23 - 29 mmol/L Final    Glucose 10/05/2020 104  70 - 110 mg/dL Final    BUN 10/05/2020 15  7 - 17 mg/dL Final    Creatinine 10/05/2020 0.67  0.50 - 1.40 mg/dL Final    Calcium 10/05/2020 9.1  8.7 - 10.5 mg/dL Final    Anion Gap 10/05/2020 6* 8 - 16 mmol/L Final    eGFR if African American 10/05/2020 >60.0  >60 mL/min/1.73 m^2 Final    eGFR if non African American 10/05/2020 >60.0  >60 mL/min/1.73 m^2 Final   Hospital Outpatient Visit on 09/23/2020   Component Date Value Ref Range Status    holter length minutes 09/23/2020 59   Final    Event Monitor Day 09/23/2020 0   Final    Holter length hours 09/23/2020 23   Final    holter length dec hours 09/23/2020 23.98   Final   Lab Visit on 08/25/2020   Component Date Value Ref Range Status    WBC 08/25/2020 7.79  3.90 - 12.70 K/uL Final    RBC 08/25/2020 4.35  4.00 - 5.40 M/uL Final    Hemoglobin 08/25/2020 13.4  12.0 - 16.0 g/dL Final    Hematocrit 08/25/2020 41.0  37.0 - 48.5 % Final    MCV 08/25/2020 94  82 - 98 fL Final    MCH  08/25/2020 30.8  27.0 - 31.0 pg Final    MCHC 08/25/2020 32.7  32.0 - 36.0 g/dL Final    RDW 08/25/2020 14.5  11.5 - 14.5 % Final    Platelets 08/25/2020 268  150 - 350 K/uL Final    MPV 08/25/2020 9.5  9.2 - 12.9 fL Final    Immature Granulocytes 08/25/2020 0.4  0.0 - 0.5 % Final    Gran # (ANC) 08/25/2020 4.3  1.8 - 7.7 K/uL Final    Immature Grans (Abs) 08/25/2020 0.03  0.00 - 0.04 K/uL Final    Lymph # 08/25/2020 2.8  1.0 - 4.8 K/uL Final    Mono # 08/25/2020 0.6  0.3 - 1.0 K/uL Final    Eos # 08/25/2020 0.0  0.0 - 0.5 K/uL Final    Baso # 08/25/2020 0.05  0.00 - 0.20 K/uL Final    nRBC 08/25/2020 0  0 /100 WBC Final    Gran % 08/25/2020 54.6  38.0 - 73.0 % Final    Lymph % 08/25/2020 36.3  18.0 - 48.0 % Final    Mono % 08/25/2020 7.8  4.0 - 15.0 % Final    Eosinophil % 08/25/2020 0.3  0.0 - 8.0 % Final    Basophil % 08/25/2020 0.6  0.0 - 1.9 % Final    Differential Method 08/25/2020 Automated   Final    Sodium 08/25/2020 134* 136 - 145 mmol/L Final    Potassium 08/25/2020 4.8  3.5 - 5.1 mmol/L Final    Chloride 08/25/2020 92* 95 - 110 mmol/L Final    CO2 08/25/2020 34* 23 - 29 mmol/L Final    Glucose 08/25/2020 109  70 - 110 mg/dL Final    BUN 08/25/2020 21* 7 - 17 mg/dL Final    Creatinine 08/25/2020 0.78  0.50 - 1.40 mg/dL Final    Calcium 08/25/2020 9.2  8.7 - 10.5 mg/dL Final    Total Protein 08/25/2020 7.9  6.0 - 8.4 g/dL Final    Albumin 08/25/2020 4.5  3.5 - 5.2 g/dL Final    Total Bilirubin 08/25/2020 0.3  0.1 - 1.0 mg/dL Final    Alkaline Phosphatase 08/25/2020 49  38 - 126 U/L Final    AST 08/25/2020 36  15 - 46 U/L Final    ALT 08/25/2020 31  10 - 44 U/L Final    Anion Gap 08/25/2020 8  8 - 16 mmol/L Final    eGFR if African American 08/25/2020 >60.0  >60 mL/min/1.73 m^2 Final    eGFR if non African American 08/25/2020 >60.0  >60 mL/min/1.73 m^2 Final    TSH 08/25/2020 4.180* 0.400 - 4.000 uIU/mL Final    CPK 08/25/2020 55  55 - 170 U/L Final    Cholesterol  08/25/2020 161  120 - 199 mg/dL Final    Triglycerides 08/25/2020 54  30 - 150 mg/dL Final    HDL 08/25/2020 75  40 - 75 mg/dL Final    LDL Cholesterol 08/25/2020 75.2  63.0 - 159.0 mg/dL Final    HDL/Cholesterol Ratio 08/25/2020 46.6  20.0 - 50.0 % Final    Total Cholesterol/HDL Ratio 08/25/2020 2.1  2.0 - 5.0 Final    Non-HDL Cholesterol 08/25/2020 86  mg/dL Final    NT-proBNP 08/25/2020 657  5 - 1800 pg/mL Final    Free T4 08/25/2020 0.96  0.71 - 1.51 ng/dL Final   Lab Visit on 06/29/2020   Component Date Value Ref Range Status    TSH 06/29/2020 3.346  0.400 - 4.000 uIU/mL Final   Hospital Outpatient Visit on 05/15/2020   Component Date Value Ref Range Status    BSA 05/15/2020 2.15  m2 Final   Admission on 05/15/2020, Discharged on 05/15/2020   Component Date Value Ref Range Status    Sodium 05/15/2020 140  136 - 145 mmol/L Final    Potassium 05/15/2020 4.3  3.5 - 5.1 mmol/L Final    Chloride 05/15/2020 101  95 - 110 mmol/L Final    CO2 05/15/2020 27  23 - 29 mmol/L Final    Glucose 05/15/2020 135* 70 - 110 mg/dL Final    BUN 05/15/2020 16  8 - 23 mg/dL Final    Creatinine 05/15/2020 0.8  0.5 - 1.4 mg/dL Final    Calcium 05/15/2020 9.3  8.7 - 10.5 mg/dL Final    Anion Gap 05/15/2020 12  8 - 16 mmol/L Final    eGFR if African American 05/15/2020 >60.0  >60 mL/min/1.73 m^2 Final    eGFR if non African American 05/15/2020 >60.0  >60 mL/min/1.73 m^2 Final    WBC 05/15/2020 9.78  3.90 - 12.70 K/uL Final    RBC 05/15/2020 4.09  4.00 - 5.40 M/uL Final    Hemoglobin 05/15/2020 12.8  12.0 - 16.0 g/dL Final    Hematocrit 05/15/2020 39.8  37.0 - 48.5 % Final    MCV 05/15/2020 97  82 - 98 fL Final    MCH 05/15/2020 31.3* 27.0 - 31.0 pg Final    MCHC 05/15/2020 32.2  32.0 - 36.0 g/dL Final    RDW 05/15/2020 14.5  11.5 - 14.5 % Final    Platelets 05/15/2020 265  150 - 350 K/uL Final    MPV 05/15/2020 10.4  9.2 - 12.9 fL Final    Immature Granulocytes 05/15/2020 0.2  0.0 - 0.5 % Final    Gran #  (ANC) 05/15/2020 4.9  1.8 - 7.7 K/uL Final    Immature Grans (Abs) 05/15/2020 0.02  0.00 - 0.04 K/uL Final    Lymph # 05/15/2020 4.0  1.0 - 4.8 K/uL Final    Mono # 05/15/2020 0.8  0.3 - 1.0 K/uL Final    Eos # 05/15/2020 0.0  0.0 - 0.5 K/uL Final    Baso # 05/15/2020 0.05  0.00 - 0.20 K/uL Final    nRBC 05/15/2020 0  0 /100 WBC Final    Gran % 05/15/2020 49.9  38.0 - 73.0 % Final    Lymph % 05/15/2020 40.4  18.0 - 48.0 % Final    Mono % 05/15/2020 8.6  4.0 - 15.0 % Final    Eosinophil % 05/15/2020 0.4  0.0 - 8.0 % Final    Basophil % 05/15/2020 0.5  0.0 - 1.9 % Final    Differential Method 05/15/2020 Automated   Final    Prothrombin Time 05/15/2020 11.0  9.0 - 12.5 sec Final    INR 05/15/2020 1.1  0.8 - 1.2 Final    POCT Glucose 05/15/2020 134* 70 - 110 mg/dL Final    Total Protein 05/15/2020 7.7  6.0 - 8.4 g/dL Final    Albumin 05/15/2020 3.7  3.5 - 5.2 g/dL Final    Total Bilirubin 05/15/2020 0.5  0.1 - 1.0 mg/dL Final    Bilirubin, Direct 05/15/2020 0.2  0.1 - 0.3 mg/dL Final    AST 05/15/2020 38  10 - 40 U/L Final    ALT 05/15/2020 28  10 - 44 U/L Final    Alkaline Phosphatase 05/15/2020 50* 55 - 135 U/L Final    POCT Glucose 05/15/2020 117* 70 - 110 mg/dL Final   Lab Visit on 05/14/2020   Component Date Value Ref Range Status    SARS-CoV2 (COVID-19) Qualitative P* 05/14/2020 Not Detected  Not Detected Final   There may be more visits with results that are not included.   (      Note cor angio 11/19 by Dr Capone prior to TAVR showed single vessel CAD with 70% tubular stenosis involving a diagonal branch flet to be best treated medically      Assessment:     1. Persistent atrial fibrillation    2. Chronic atrial fibrillation    3. Coronary artery disease involving native coronary artery of native heart without angina pectoris    4. Pure hypercholesterolemia    5. S/P TAVR (transcatheter aortic valve replacement)    6. Chronic diastolic CHF (congestive heart failure)    7. Chest pain,  unspecified type    8. Mesenteric artery insufficiency    9. Gastroesophageal reflux disease, unspecified whether esophagitis present    10. BMI 40.0-44.9, adult    11. Localized edema    12. Hyponatremia      Plan:   Adriana was seen today for hospital follow up.    Diagnoses and all orders for this visit:    Persistent atrial fibrillation    Chronic atrial fibrillation  -     IN OFFICE EKG 12-LEAD (to [place])    Coronary artery disease involving native coronary artery of native heart without angina pectoris    Pure hypercholesterolemia    S/P TAVR (transcatheter aortic valve replacement)    Chronic diastolic CHF (congestive heart failure)    Chest pain, unspecified type    Mesenteric artery insufficiency    Gastroesophageal reflux disease, unspecified whether esophagitis present    BMI 40.0-44.9, adult    Localized edema    Hyponatremia           Pt encouraged to stay active    Low carb diet discussed at length and in detail    Some of the edema is venous stasis edema due to diltiazem rather than diastolic CHF.  Pt will continue to wear compression stockings.    Some of the shortness of breath is due to obesity and deconditioning and asthma rather than diastolic CHF    Due to hyponatremia I have explained the need to restrict fluid intake in detail    Pt has a f/u appt with Dr Bee for a fib    Pt has a f/u appt with Dr Capone regarding TAVR and CAD.  Pt's recent chest discomfort is not typical for angina pectoris.. Consider PET stress but will defer decision to Dr Capone.  Pt has also had costochondritis and GERD as causes of  Chest pain.    F/u with Dr Hilton    Same meds except reduce the furosemide to one a day unless weight or edema go up (in which case pt should double the furosemide until the weight is down    Hold furosemide for one day prior to CT with contrast and resume the day of the test      Follow up in about 4 weeks (around 12/10/2020).

## 2020-11-17 ENCOUNTER — HOSPITAL ENCOUNTER (OUTPATIENT)
Dept: PULMONOLOGY | Facility: HOSPITAL | Age: 75
Discharge: HOME | End: 2020-11-17
Attending: INTERNAL MEDICINE
Payer: MEDICARE

## 2020-11-17 DIAGNOSIS — J45.50 SEVERE PERSISTENT ASTHMA IN ADULT WITHOUT COMPLICATION: ICD-10-CM

## 2020-11-17 DIAGNOSIS — J45.40 MODERATE PERSISTENT ASTHMA IN ADULT WITHOUT COMPLICATION: ICD-10-CM

## 2020-11-17 PROCEDURE — 94010 BREATHING CAPACITY TEST: CPT

## 2020-11-17 PROCEDURE — 94727 GAS DIL/WSHOT DETER LNG VOL: CPT

## 2020-11-17 PROCEDURE — 94729 DIFFUSING CAPACITY: CPT

## 2020-11-19 ENCOUNTER — OFFICE VISIT (OUTPATIENT)
Dept: ELECTROPHYSIOLOGY | Facility: CLINIC | Age: 75
End: 2020-11-19
Payer: MEDICARE

## 2020-11-19 ENCOUNTER — OFFICE VISIT (OUTPATIENT)
Dept: CARDIOLOGY | Facility: CLINIC | Age: 75
End: 2020-11-19
Payer: MEDICARE

## 2020-11-19 ENCOUNTER — LAB VISIT (OUTPATIENT)
Dept: LAB | Facility: HOSPITAL | Age: 75
End: 2020-11-19
Attending: INTERNAL MEDICINE
Payer: MEDICARE

## 2020-11-19 ENCOUNTER — HOSPITAL ENCOUNTER (OUTPATIENT)
Dept: CARDIOLOGY | Facility: HOSPITAL | Age: 75
Discharge: HOME | End: 2020-11-19
Attending: INTERNAL MEDICINE
Payer: MEDICARE

## 2020-11-19 VITALS
HEART RATE: 75 BPM | SYSTOLIC BLOOD PRESSURE: 164 MMHG | DIASTOLIC BLOOD PRESSURE: 70 MMHG | WEIGHT: 235.88 LBS | HEART RATE: 84 BPM | DIASTOLIC BLOOD PRESSURE: 70 MMHG | BODY MASS INDEX: 40.27 KG/M2 | SYSTOLIC BLOOD PRESSURE: 120 MMHG | HEIGHT: 64 IN | OXYGEN SATURATION: 96 % | HEIGHT: 64 IN | WEIGHT: 234 LBS | BODY MASS INDEX: 39.95 KG/M2

## 2020-11-19 VITALS
HEIGHT: 64 IN | WEIGHT: 235.88 LBS | HEART RATE: 78 BPM | DIASTOLIC BLOOD PRESSURE: 73 MMHG | BODY MASS INDEX: 40.27 KG/M2 | SYSTOLIC BLOOD PRESSURE: 145 MMHG

## 2020-11-19 DIAGNOSIS — I49.8 OTHER SPECIFIED CARDIAC ARRHYTHMIAS: ICD-10-CM

## 2020-11-19 DIAGNOSIS — I10 ESSENTIAL HYPERTENSION: ICD-10-CM

## 2020-11-19 DIAGNOSIS — I48.20 CHRONIC ATRIAL FIBRILLATION: Primary | ICD-10-CM

## 2020-11-19 DIAGNOSIS — I25.10 CORONARY ARTERY DISEASE INVOLVING NATIVE CORONARY ARTERY OF NATIVE HEART WITHOUT ANGINA PECTORIS: ICD-10-CM

## 2020-11-19 DIAGNOSIS — I48.19 PERSISTENT ATRIAL FIBRILLATION: ICD-10-CM

## 2020-11-19 DIAGNOSIS — I48.92 ATRIAL FLUTTER, UNSPECIFIED TYPE: ICD-10-CM

## 2020-11-19 DIAGNOSIS — K55.1 CHRONIC MESENTERIC ISCHEMIA: ICD-10-CM

## 2020-11-19 DIAGNOSIS — J32.9 CHRONIC RHINOSINUSITIS: Primary | ICD-10-CM

## 2020-11-19 DIAGNOSIS — I50.32 CHRONIC DIASTOLIC CHF (CONGESTIVE HEART FAILURE): ICD-10-CM

## 2020-11-19 DIAGNOSIS — Z95.2 S/P TAVR (TRANSCATHETER AORTIC VALVE REPLACEMENT): Primary | ICD-10-CM

## 2020-11-19 DIAGNOSIS — I35.0 AORTIC VALVE STENOSIS, ETIOLOGY OF CARDIAC VALVE DISEASE UNSPECIFIED: ICD-10-CM

## 2020-11-19 DIAGNOSIS — Z95.2 S/P TAVR (TRANSCATHETER AORTIC VALVE REPLACEMENT): ICD-10-CM

## 2020-11-19 DIAGNOSIS — E66.01 MORBID OBESITY: ICD-10-CM

## 2020-11-19 DIAGNOSIS — M94.0 COSTOCHONDRITIS: ICD-10-CM

## 2020-11-19 PROBLEM — I48.0 PAROXYSMAL ATRIAL FIBRILLATION: Status: RESOLVED | Noted: 2020-10-30 | Resolved: 2020-11-19

## 2020-11-19 PROBLEM — I48.91 ATRIAL FIBRILLATION WITH RVR: Status: RESOLVED | Noted: 2020-10-19 | Resolved: 2020-11-19

## 2020-11-19 LAB
ASCENDING AORTA: 3.21 CM
AV INDEX (PROSTH): 0.39
AV MEAN GRADIENT: 10 MMHG
AV PEAK GRADIENT: 20 MMHG
AV VALVE AREA: 1.44 CM2
AV VELOCITY RATIO: 0.37
BASOPHILS # BLD AUTO: 0.06 K/UL (ref 0–0.2)
BASOPHILS NFR BLD: 0.8 % (ref 0–1.9)
BSA FOR ECHO PROCEDURE: 2.19 M2
CV ECHO LV RWT: 0.49 CM
DIFFERENTIAL METHOD: ABNORMAL
DOP CALC AO PEAK VEL: 2.24 M/S
DOP CALC AO VTI: 46.88 CM
DOP CALC LVOT AREA: 3.7 CM2
DOP CALC LVOT DIAMETER: 2.17 CM
DOP CALC LVOT PEAK VEL: 0.82 M/S
DOP CALC LVOT STROKE VOLUME: 67.53 CM3
DOP CALCLVOT PEAK VEL VTI: 18.27 CM
E/E' RATIO: 12.25 M/S
ECHO LV POSTERIOR WALL: 1.08 CM (ref 0.6–1.1)
EOSINOPHIL # BLD AUTO: 0 K/UL (ref 0–0.5)
EOSINOPHIL NFR BLD: 0.3 % (ref 0–8)
ERYTHROCYTE [DISTWIDTH] IN BLOOD BY AUTOMATED COUNT: 14.7 % (ref 11.5–14.5)
FRACTIONAL SHORTENING: 33 % (ref 28–44)
HCT VFR BLD AUTO: 38.8 % (ref 37–48.5)
HGB BLD-MCNC: 12.8 G/DL (ref 12–16)
IMM GRANULOCYTES # BLD AUTO: 0.11 K/UL (ref 0–0.04)
IMM GRANULOCYTES NFR BLD AUTO: 1.4 % (ref 0–0.5)
INTERVENTRICULAR SEPTUM: 1.07 CM (ref 0.6–1.1)
IVRT: 76.12 MSEC
LA MAJOR: 6.32 CM
LA MINOR: 6.25 CM
LA WIDTH: 4.95 CM
LEFT ATRIUM SIZE: 4.44 CM
LEFT ATRIUM VOLUME INDEX: 56.1 ML/M2
LEFT ATRIUM VOLUME: 117.41 CM3
LEFT INTERNAL DIMENSION IN SYSTOLE: 2.96 CM (ref 2.1–4)
LEFT VENTRICLE DIASTOLIC VOLUME INDEX: 42.57 ML/M2
LEFT VENTRICLE DIASTOLIC VOLUME: 89.01 ML
LEFT VENTRICLE MASS INDEX: 79 G/M2
LEFT VENTRICLE SYSTOLIC VOLUME INDEX: 16.2 ML/M2
LEFT VENTRICLE SYSTOLIC VOLUME: 33.87 ML
LEFT VENTRICULAR INTERNAL DIMENSION IN DIASTOLE: 4.43 CM (ref 3.5–6)
LEFT VENTRICULAR MASS: 165.3 G
LV LATERAL E/E' RATIO: 10.89 M/S
LV SEPTAL E/E' RATIO: 14 M/S
LYMPHOCYTES # BLD AUTO: 2.9 K/UL (ref 1–4.8)
LYMPHOCYTES NFR BLD: 38.3 % (ref 18–48)
MCH RBC QN AUTO: 31.3 PG (ref 27–31)
MCHC RBC AUTO-ENTMCNC: 33 G/DL (ref 32–36)
MCV RBC AUTO: 95 FL (ref 82–98)
MONOCYTES # BLD AUTO: 0.9 K/UL (ref 0.3–1)
MONOCYTES NFR BLD: 12.3 % (ref 4–15)
MV PEAK E VEL: 0.98 M/S
NEUTROPHILS # BLD AUTO: 3.6 K/UL (ref 1.8–7.7)
NEUTROPHILS NFR BLD: 46.9 % (ref 38–73)
NRBC BLD-RTO: 0 /100 WBC
PISA TR MAX VEL: 2.53 M/S
PLATELET # BLD AUTO: 434 K/UL (ref 150–350)
PMV BLD AUTO: 9.3 FL (ref 9.2–12.9)
PULM VEIN S/D RATIO: 0.34
PV PEAK D VEL: 0.74 M/S
PV PEAK S VEL: 0.25 M/S
RA MAJOR: 5.05 CM
RA PRESSURE: 3 MMHG
RA WIDTH: 3.68 CM
RBC # BLD AUTO: 4.09 M/UL (ref 4–5.4)
RIGHT VENTRICULAR END-DIASTOLIC DIMENSION: 3.14 CM
SINUS: 3.46 CM
STJ: 2.85 CM
TDI LATERAL: 0.09 M/S
TDI SEPTAL: 0.07 M/S
TDI: 0.08 M/S
TR MAX PG: 26 MMHG
TRICUSPID ANNULAR PLANE SYSTOLIC EXCURSION: 1.75 CM
TV REST PULMONARY ARTERY PRESSURE: 29 MMHG
WBC # BLD AUTO: 7.66 K/UL (ref 3.9–12.7)

## 2020-11-19 PROCEDURE — 1157F ADVNC CARE PLAN IN RCRD: CPT | Mod: S$GLB,,, | Performed by: INTERNAL MEDICINE

## 2020-11-19 PROCEDURE — 99214 PR OFFICE/OUTPT VISIT, EST, LEVL IV, 30-39 MIN: ICD-10-PCS | Mod: S$GLB,,, | Performed by: INTERNAL MEDICINE

## 2020-11-19 PROCEDURE — 93306 TTE W/DOPPLER COMPLETE: CPT | Mod: 26,,, | Performed by: INTERNAL MEDICINE

## 2020-11-19 PROCEDURE — 1126F AMNT PAIN NOTED NONE PRSNT: CPT | Mod: S$GLB,,, | Performed by: INTERNAL MEDICINE

## 2020-11-19 PROCEDURE — 1101F PR PT FALLS ASSESS DOC 0-1 FALLS W/OUT INJ PAST YR: ICD-10-PCS | Mod: CPTII,S$GLB,, | Performed by: NURSE PRACTITIONER

## 2020-11-19 PROCEDURE — 99999 PR PBB SHADOW E&M-EST. PATIENT-LVL V: CPT | Mod: PBBFAC,,, | Performed by: NURSE PRACTITIONER

## 2020-11-19 PROCEDURE — 1126F PR PAIN SEVERITY QUANTIFIED, NO PAIN PRESENT: ICD-10-PCS | Mod: S$GLB,,, | Performed by: NURSE PRACTITIONER

## 2020-11-19 PROCEDURE — 1157F PR ADVANCE CARE PLAN OR EQUIV PRESENT IN MEDICAL RECORD: ICD-10-PCS | Mod: S$GLB,,, | Performed by: NURSE PRACTITIONER

## 2020-11-19 PROCEDURE — 1159F PR MEDICATION LIST DOCUMENTED IN MEDICAL RECORD: ICD-10-PCS | Mod: S$GLB,,, | Performed by: NURSE PRACTITIONER

## 2020-11-19 PROCEDURE — 93010 RHYTHM STRIP: ICD-10-PCS | Mod: S$GLB,,, | Performed by: INTERNAL MEDICINE

## 2020-11-19 PROCEDURE — 99214 OFFICE O/P EST MOD 30 MIN: CPT | Mod: S$GLB,,, | Performed by: INTERNAL MEDICINE

## 2020-11-19 PROCEDURE — 1101F PT FALLS ASSESS-DOCD LE1/YR: CPT | Mod: CPTII,S$GLB,, | Performed by: NURSE PRACTITIONER

## 2020-11-19 PROCEDURE — 3288F FALL RISK ASSESSMENT DOCD: CPT | Mod: CPTII,S$GLB,, | Performed by: NURSE PRACTITIONER

## 2020-11-19 PROCEDURE — 1157F PR ADVANCE CARE PLAN OR EQUIV PRESENT IN MEDICAL RECORD: ICD-10-PCS | Mod: S$GLB,,, | Performed by: INTERNAL MEDICINE

## 2020-11-19 PROCEDURE — 93005 RHYTHM STRIP: ICD-10-PCS | Mod: S$GLB,,, | Performed by: INTERNAL MEDICINE

## 2020-11-19 PROCEDURE — 1159F MED LIST DOCD IN RCRD: CPT | Mod: S$GLB,,, | Performed by: NURSE PRACTITIONER

## 2020-11-19 PROCEDURE — 99214 OFFICE O/P EST MOD 30 MIN: CPT | Mod: S$GLB,,, | Performed by: NURSE PRACTITIONER

## 2020-11-19 PROCEDURE — 3078F DIAST BP <80 MM HG: CPT | Mod: CPTII,S$GLB,, | Performed by: NURSE PRACTITIONER

## 2020-11-19 PROCEDURE — 3078F PR MOST RECENT DIASTOLIC BLOOD PRESSURE < 80 MM HG: ICD-10-PCS | Mod: CPTII,S$GLB,, | Performed by: INTERNAL MEDICINE

## 2020-11-19 PROCEDURE — 1126F AMNT PAIN NOTED NONE PRSNT: CPT | Mod: S$GLB,,, | Performed by: NURSE PRACTITIONER

## 2020-11-19 PROCEDURE — 1157F ADVNC CARE PLAN IN RCRD: CPT | Mod: S$GLB,,, | Performed by: NURSE PRACTITIONER

## 2020-11-19 PROCEDURE — 3074F PR MOST RECENT SYSTOLIC BLOOD PRESSURE < 130 MM HG: ICD-10-PCS | Mod: CPTII,S$GLB,, | Performed by: NURSE PRACTITIONER

## 2020-11-19 PROCEDURE — 93306 TTE W/DOPPLER COMPLETE: CPT

## 2020-11-19 PROCEDURE — 1126F PR PAIN SEVERITY QUANTIFIED, NO PAIN PRESENT: ICD-10-PCS | Mod: S$GLB,,, | Performed by: INTERNAL MEDICINE

## 2020-11-19 PROCEDURE — 1159F MED LIST DOCD IN RCRD: CPT | Mod: S$GLB,,, | Performed by: INTERNAL MEDICINE

## 2020-11-19 PROCEDURE — 3078F PR MOST RECENT DIASTOLIC BLOOD PRESSURE < 80 MM HG: ICD-10-PCS | Mod: CPTII,S$GLB,, | Performed by: NURSE PRACTITIONER

## 2020-11-19 PROCEDURE — 93010 ELECTROCARDIOGRAM REPORT: CPT | Mod: S$GLB,,, | Performed by: INTERNAL MEDICINE

## 2020-11-19 PROCEDURE — 36415 COLL VENOUS BLD VENIPUNCTURE: CPT

## 2020-11-19 PROCEDURE — 3074F PR MOST RECENT SYSTOLIC BLOOD PRESSURE < 130 MM HG: ICD-10-PCS | Mod: CPTII,S$GLB,, | Performed by: INTERNAL MEDICINE

## 2020-11-19 PROCEDURE — 3074F SYST BP LT 130 MM HG: CPT | Mod: CPTII,S$GLB,, | Performed by: NURSE PRACTITIONER

## 2020-11-19 PROCEDURE — 99999 PR PBB SHADOW E&M-EST. PATIENT-LVL V: ICD-10-PCS | Mod: PBBFAC,,,

## 2020-11-19 PROCEDURE — 99999 PR PBB SHADOW E&M-EST. PATIENT-LVL V: CPT | Mod: PBBFAC,,,

## 2020-11-19 PROCEDURE — 3078F DIAST BP <80 MM HG: CPT | Mod: CPTII,S$GLB,, | Performed by: INTERNAL MEDICINE

## 2020-11-19 PROCEDURE — 93005 ELECTROCARDIOGRAM TRACING: CPT | Mod: S$GLB,,, | Performed by: INTERNAL MEDICINE

## 2020-11-19 PROCEDURE — 93306 ECHO (CUPID ONLY): ICD-10-PCS | Mod: 26,,, | Performed by: INTERNAL MEDICINE

## 2020-11-19 PROCEDURE — 3074F SYST BP LT 130 MM HG: CPT | Mod: CPTII,S$GLB,, | Performed by: INTERNAL MEDICINE

## 2020-11-19 PROCEDURE — 85025 COMPLETE CBC W/AUTO DIFF WBC: CPT | Mod: 91

## 2020-11-19 PROCEDURE — 99999 PR PBB SHADOW E&M-EST. PATIENT-LVL V: ICD-10-PCS | Mod: PBBFAC,,, | Performed by: NURSE PRACTITIONER

## 2020-11-19 PROCEDURE — 3288F PR FALLS RISK ASSESSMENT DOCUMENTED: ICD-10-PCS | Mod: CPTII,S$GLB,, | Performed by: NURSE PRACTITIONER

## 2020-11-19 PROCEDURE — 86003 ALLG SPEC IGE CRUDE XTRC EA: CPT

## 2020-11-19 PROCEDURE — 1159F PR MEDICATION LIST DOCUMENTED IN MEDICAL RECORD: ICD-10-PCS | Mod: S$GLB,,, | Performed by: INTERNAL MEDICINE

## 2020-11-19 PROCEDURE — 99214 PR OFFICE/OUTPT VISIT, EST, LEVL IV, 30-39 MIN: ICD-10-PCS | Mod: S$GLB,,, | Performed by: NURSE PRACTITIONER

## 2020-11-19 RX ORDER — FUROSEMIDE 40 MG/1
40 TABLET ORAL DAILY
Qty: 3 TABLET | Refills: 0
Start: 2020-11-19 | End: 2020-12-08 | Stop reason: SDUPTHER

## 2020-11-19 NOTE — PROGRESS NOTES
Subjective:    Patient ID:  Adriana Strong is a 75 y.o. female who presents for 1 year follow-up of TAVR.     Referring Physician: Dr. Zeb CHERY  Ms. 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 She is s/p successful placement of a 26 S3 TAVR via TF access under MAC sedation on 11/20/19. Today she reports that her SOB is improved, but still present. She mainly feels short of breath when she experiences heart palpitations. Heart palpitations are also associated with CP that will last from 2-5 minutes. She does have SL NTG at home that she has not tried. 3 ER visits with asthma exacerbations. She is now being followed by Pulmonology, Dr. Hilton, at [facility]. She currently drinks 5-6 cups of coffee/day.     She is currently on Eliquis and Plavix therapy due to a history of chronic Afib and LIZ stents. She is followed by Dr. Bee/ Lissette Chacon, AMY and Dr Hernandez. CHADS VASC 4 and HAS BLED score of 3. She was offered Watchman Device at her 1 month follow up and was not interested at that time.  She has a history of a TIA. Since she last visit, she has undergone 2 DCCV after the first attempt was unsuccessful. She has also failed amiodarone therapy.     NYHA: II CCS: 0    Review of Systems   Constitution: Negative for chills and fever.   HENT: Negative for sore throat.    Eyes: Negative for blurred vision.   Cardiovascular: Positive for dyspnea on exertion and palpitations. Negative for chest pain, claudication, cyanosis, irregular heartbeat, leg swelling, near-syncope, orthopnea, paroxysmal nocturnal dyspnea and syncope.   Respiratory: Negative for cough and sputum production.    Hematologic/Lymphatic: Does not bruise/bleed easily.   Skin: Negative for itching, rash and  suspicious lesions.   Musculoskeletal: Negative for falls.   Gastrointestinal: Negative for abdominal pain and change in bowel habit.   Genitourinary: Negative for dysuria.   Neurological: Negative for disturbances in coordination, dizziness and loss of balance.   Psychiatric/Behavioral: Negative for altered mental status. The patient is nervous/anxious.           Past Medical History:   Diagnosis Date    Acute on chronic diastolic (congestive) heart failure 11/20/2019    Anticoagulant long-term use     on Plavix since May 2015    Anxiety     Arthritis     Asthma     Atrial fibrillation     Cataracts, bilateral     Chest pain, atypical     Colon polyp     Coronary artery disease     Diabetes mellitus     Dry eyes     Esophageal erosions     GERD (gastroesophageal reflux disease)     Heart murmur     Hemorrhoid     High cholesterol     Hypertension     Irritable bowel syndrome     Shingles 2015    Stenosis of aortic and mitral valves     Stroke     TIA (transient ischemic attack)     Use of cane as ambulatory aid        Current Outpatient Medications:     acetaminophen (TYLENOL ARTHRITIS) 650 MG TbSR, Take 1,300 mg by mouth 2 (two) times daily., Disp: , Rfl:     albuterol (PROVENTIL) 2.5 mg /3 mL (0.083 %) nebulizer solution, USE 1 VIAL IN NEBULIZER EVERY 4 6 HOURS AS NEEDED, Disp: , Rfl:     albuterol-ipratropium (DUO-NEB) 2.5 mg-0.5 mg/3 mL nebulizer solution, NEBULIZE 1 VIAL EVERY 4 HOURS AS NEEDED, Disp: , Rfl:     apixaban (ELIQUIS) 5 mg Tab, Take 1 tablet (5 mg total) by mouth 2 (two) times daily., Disp: 180 tablet, Rfl: 3    ascorbic acid (VITAMIN C) 500 MG tablet, Take 1,000 mg by mouth once daily. , Disp: , Rfl:     atorvastatin (LIPITOR) 10 MG tablet, Take 1 tablet (10 mg total) by mouth once daily., Disp: 90 tablet, Rfl: 3    azelastine (ASTELIN) 137 mcg nasal spray, 1 spray by Nasal route 2 (two) times daily., Disp: , Rfl:     bisacodyl (DULCOLAX) 5 mg EC tablet, Take 5 mg  by mouth daily as needed for Constipation., Disp: , Rfl:     budesonide-formoterol 160-4.5 mcg (SYMBICORT) 160-4.5 mcg/actuation HFAA, Inhale 2 puffs into the lungs every 12 (twelve) hours. Controller, Disp: , Rfl:     calcium carbonate (OS-MICHAEL) 600 mg (1,500 mg) Tab, Take 1,200 mg by mouth once daily., Disp: , Rfl:     calcium carbonate/vitamin D3 (CALTRATE 600 + D ORAL), Take by mouth., Disp: , Rfl:     clopidogreL (PLAVIX) 75 mg tablet, Take 1 tablet (75 mg total) by mouth once daily., Disp: 90 tablet, Rfl: 3    cyanocobalamin (VITAMIN B-12) 1000 MCG tablet, Take 100 mcg by mouth once daily., Disp: , Rfl:     diltiaZEM (TIAZAC) 360 MG Cs24, Take 1 capsule (360 mg total) by mouth once daily., Disp: 30 capsule, Rfl: 11    diphenhydrAMINE (BENADRYL) 50 MG capsule, Take 1 capsule (50 mg total) by mouth every 6 (six) hours as needed for Itching., Disp: 1 capsule, Rfl: 0    DULoxetine (CYMBALTA) 20 MG capsule, TAKE 1 CAPSULE BY MOUTH EVERY DAY, Disp: 90 capsule, Rfl: 3    fexofenadine (ALLEGRA) 60 MG tablet, Take 180 mg by mouth every morning. , Disp: , Rfl:     fish oil-omega-3 fatty acids 300-1,000 mg capsule, Take 1 g by mouth once daily. , Disp: , Rfl:     fluticasone (FLONASE) 50 mcg/actuation nasal spray, 1 spray by Each Nare route every morning. , Disp: , Rfl:     folic acid/multivit-min/lutein (CENTRUM SILVER ORAL), Take by mouth., Disp: , Rfl:     gabapentin (NEURONTIN) 300 MG capsule, Take 2 capsules (600 mg total) by mouth 3 (three) times daily. NO FURTHER REFILL WITHOUT APT, Disp: 540 capsule, Rfl: 0    isosorbide mononitrate (IMDUR) 30 MG 24 hr tablet, Take 1 tablet (30 mg total) by mouth once daily., Disp: 90 tablet, Rfl: 3    lisinopriL (PRINIVIL,ZESTRIL) 20 MG tablet, Take 2 tablets (40 mg total) by mouth once daily., Disp: 180 tablet, Rfl: 3    lorazepam (ATIVAN) 0.5 MG tablet, Take 0.5 mg by mouth every morning. , Disp: , Rfl:     metFORMIN (GLUCOPHAGE) 500 MG tablet, Take 500 mg by  mouth 2 (two) times daily., Disp: , Rfl: 1    nitroGLYCERIN (NITROSTAT) 0.4 MG SL tablet, Place 1 tablet (0.4 mg total) under the tongue every 5 (five) minutes as needed for Chest pain., Disp: 30 tablet, Rfl: 11    ondansetron (ZOFRAN-ODT) 4 MG TbDL, DISSOLVE 1 TABLET ON TONGUE EVERY 8 HOURS AS NEEDED FOR NAUSEA, Disp: , Rfl: 1    oxybutynin (DITROPAN-XL) 5 MG TR24, Take 5 mg by mouth every other day. , Disp: , Rfl:     polyethylene glycol (GLYCOLAX) 17 gram PwPk, Take by mouth., Disp: , Rfl:     POLYSORBATE 80/GLYCERIN (REFRESH DRY EYE THERAPY OPHT), Apply to eye 2 (two) times daily., Disp: , Rfl:     potassium gluconate 550 mg (90 mg) Tab, Take by mouth. 2 in am and 2 pm, Disp: , Rfl:     predniSONE (DELTASONE) 50 MG Tab, Take 1 tablet (50 mg total) by mouth once daily., Disp: 3 tablet, Rfl: 0    vitamin D (VITAMIN D3) 1000 units Tab, Take 2,000 Units by mouth once daily., Disp: , Rfl:     zafirlukast (ACCOLATE) 20 MG tablet, Take 20 mg by mouth once daily., Disp: , Rfl: 3    furosemide (LASIX) 40 MG tablet, Take 1 tablet (40 mg total) by mouth once daily. for 3 days, Disp: 3 tablet, Rfl: 0  No current facility-administered medications for this visit.     Facility-Administered Medications Ordered in Other Visits:     0.9%  NaCl infusion, , Intravenous, Continuous, Lynette Hightower NP    sodium chloride 0.9% flush 5 mL, 5 mL, Intravenous, PRN, Lynette Hightower NP    Objective:    Physical Exam   Constitutional: She is oriented to person, place, and time. She appears well-developed and well-nourished. No distress.   HENT:   Head: Normocephalic and atraumatic.   Eyes: EOM are normal.   Neck: Normal range of motion. No JVD present.   Cardiovascular: Normal rate and intact distal pulses. An irregular rhythm present.   No murmur heard.  Pulmonary/Chest: Effort normal and breath sounds normal. No respiratory distress.   Abdominal: Soft. She exhibits no distension.   Musculoskeletal:         General:  "No edema.   Neurological: She is alert and oriented to person, place, and time.   Skin: Skin is warm and dry. She is not diaphoretic.   Vitals reviewed.          Vitals:    11/19/20 1038 11/19/20 1040   BP: (!) 166/70 (!) 164/70   BP Location: Right arm Left arm   Patient Position: Sitting Sitting   BP Method: Large (Automatic) Large (Automatic)   Pulse: 84 84   SpO2: 96%    Weight: 107 kg (235 lb 14.3 oz)    Height: 5' 4" (1.626 m)      Body mass index is 40.49 kg/m².    Test(s) Reviewed  I have reviewed the following in detail:  [] Stress test   [] Angiography   [] Echocardiogram   [x] Labs: H/H 12.2/37.7, Cr 0.9   [] Other:     TTE Today:  · The left ventricle is normal in size with normal systolic function. The estimated ejection fraction is 60%.  · There is a 26 mm S3 transcutaneously-placed aortic bioprosthesis present. There is no aortic aortic insufficiency present at all.  · There is left ventricular concentric remodeling.  · Severe left atrial enlargement.  · Diastolic dysfunction.  · Normal right ventricular size with normal right ventricular systolic function.  · Mild tricuspid regurgitation.  · The estimated PA systolic pressure is 29 mmHg.  · Normal central venous pressure (3 mmHg).    Assessment:   S/P TAVR (transcatheter aortic valve replacement)  S/p successful placement of 26 mm Suzi S3 on 11/2019. TTE personally reviewed today which shows no PVL.     Aortic stenosis  Resolved post TAVR.  Mean gradient 10 mmHg, peak velocity 0.82 m/s.     Persistent atrial fibrillation  History of failed DCCV x2 and amiodarone therapy. She is followed by Dr. Bee/Lissette Chacon NP. [id number] 4 (4.0%/yr) and HAS-BLED of 3 (3.74%/yr) Currently on Eliquisfor oral anticoagulation. She has a history of a TIA. She is not interested in Watchman at this time. FATIMAH will need to be measured by Cardiac CTA if she changes her mind.     Chronic diastolic CHF (congestive heart failure)  Compensated on exam. Continue " Lasix.     Chronic mesenteric ischemia  S/P LIZ stents. On Plavix. Continue to follow with Dr. Hernandez    CAD (coronary artery disease)  [facility] 11/2019 prior to TAVR showed single-vessel LAD disease with long tubular stenosis. Angiogram reviewed with Dr. Capone today. Still recommend that this single vessel disease is best managed medically. 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 Regarding her reported chest pain, seems more anxiety vs asthma vs MSK induced given reported symptoms. She will try NTG at home to see if it relieves her symptoms.       Plan:     1. Follow up with [facility] regarding TAVR PRN.  2. Continue Eliquis/Plavix.   3. Follow up with EP regarding further options for Afib management.   4. Instructed to try SL NTG that she has at home next time she experiences CP to help differentiate angina vs MSK vs pulmonary vs anxiety.  5. Continue to treat CAD medically with Dr. Carrington.   6. Instructed to stop caffeine intake and reassess symptoms.   7. SBE prophylaxis for life.   8. Continue to follow up with Cardiology, Dr. Carrington.  9. She is not interested in Watchman at this time. If she changes her mind, we will see her in clinic and measure FATIMAH on TAVR CTA.     Danii Olivia PA-C  Valve and Structural Heart Disease  [facility]

## 2020-11-19 NOTE — LETTER
November 19, 2020      Hany Bee MD  [address]  [facility] [address]           [id number]  [address]  [facility] [address]  Phone: [phone]  Fax: [phone]          Patient: Adriana Strong   MR Number: [id number]   YOB: 1945   Date of Visit: 11/19/2020       Dear Dr. Hany Bee:    Thank you for referring Adriana Strong to me for evaluation. Attached you will find relevant portions of my assessment and plan of care.    If you have questions, please do not hesitate to call me. I look forward to following Adriana Strong along with you.    Sincerely,    Lissette Chacon, AMY    Enclosure  CC:  No Recipients    If you would like to receive this communication electronically, please contact [email or web address] or [phone] to request more information on [employer] Link access.    For providers and/or their staff who would like to refer a patient to [facility], please contact us through our one-stop-shop provider referral line, [facility], at [phone].    If you feel you have received this communication in error or would no longer like to receive these types of communications, please e-mail [email or web address]

## 2020-11-19 NOTE — PROGRESS NOTES
"Ms. Strong is a patient of Dr. eBe and was last seen in clinic 9/1/2020.      Subjective:   Patient ID:  Adriana Strong is a 75 y.o. female who presents for follow-up of Atrial Fibrillation  .     HPI:    Ms. Strong is a 75 y.o. female with severe AS s/p TAVR (4/2019), CVA/TIA, AF, DM, HFpEF, HTN here for follow up.      Background:     severe AS, s/p TAVR 4/2019  syncope prior to TAVR  CVA/TIA  AF, without hx of DCCV ever  DM2 on meds  HFpEF on meds  HTN on meds     Had TAVR 2019. No change to QRS, and no heart block.  1 week later: Suffered vagal-sounding syncope (no injury, confusion/washed out for hours afterward, "dehydration" dx at ER).   Sent home with MCOT. This shows AF.     10/19 echo 60%. MISTY 0.8 cm2     ECG is AF at 103 bpm  Syncope - likely vasovagal. Meds reduced.  AF - PEDRO/DCCV and 30-day monitor in near future. If feels better in NSR, will endeavor to maintain NSR -- which may be difficult, including requiring AAD (e.g., amio).     2/10/2020: Cardioversion was unsuccessful without restoration of normal sinus rhythm. Cardioversion restored sinus rhythm with early recurrence of atrial fibrillation (ERAF).  Post procedure EKG showed normal sinus rhythm with 1st degree AV block with a ventricular rate of 83 bpm. Sent home with event monitor, which showed AT.     5/15/2020: DCCV. Post procedure  EKG showed  NSR. Plan to continue home medications including Eliquis 5 mg twice daily for CVA prophylaxis  For rhythm control, instructed to start Amiodarone with a Loading dose: Take 400 mg ( 2 tablets ) twice daily for 2 weeks, then reduce to 400 mg ( 2 tablets) once daily  for 2 weeks , then reduce to 200 mg  ( 1 tablet) once daily thereafter.      Continued to feel palpitations at clinic follow up 6/29/2020 despite amiodarone. Event monitor ordered.  Increased losartan for BP control.     Clinic visit 9/1/2020: She is back out of rhythm despite amiodarone. During the time she was wearing her event monitor, " "her symptoms continued. The was in SR throughout that time. She did not note a difference when she reverted out of SR. Case discussed with Dr. Bee. Will continue with rate control strategy at this point. Her BP is also poorly controlled. Will increase diltiazem for rate and BP control. She remains on eliquis for CVA prophylaxis.     Update (11/19/2020):    Has been to the ED several times recently, thinking she was in new AF. 11/10/2020: Symptoms chest pain and "jerking." Some palpitations. (Symptoms c/w those reported while wearing event monitor.) ED visit 10/30/2020: CP and tightness after drinking coffee in the morning. ED visit 10/21/2020 - asthma exacerbation.   She continues to report palpitations and chest pain. 5-6 cups of coffee per day. She was unsure if she was going in and out of AF.    She is currently taking eliquis 5mg BID for stroke prophylaxis and denies significant bleeding episodes. She is currently being treated with diltiazem 360mg for HR control.  Kidney function is stable, with a creatinine of 0.9 on 11/19/2020.    I have personally reviewed the patient's EKG today, which shows AF at 78bpm. QRS is 88. QTc is 451.    Relevant Cardiac Test Results:    2D Echo (11/19/2020):  · The left ventricle is normal in size with normal systolic function. The estimated ejection fraction is 60%.  · There is a 26 mm S3 transcutaneously-placed aortic bioprosthesis present. There is no aortic aortic insufficiency present at all.  · There is left ventricular concentric remodeling.  · Severe left atrial enlargement.  · Diastolic dysfunction.  · Normal right ventricular size with normal right ventricular systolic function.  · Mild tricuspid regurgitation.  · The estimated PA systolic pressure is 29 mmHg.  · Normal central venous pressure (3 mmHg).    Current Outpatient Medications   Medication Sig    acetaminophen (TYLENOL ARTHRITIS) 650 MG TbSR Take 1,300 mg by mouth 2 (two) times daily.    albuterol " (PROVENTIL) 2.5 mg /3 mL (0.083 %) nebulizer solution USE 1 VIAL IN NEBULIZER EVERY 4 6 HOURS AS NEEDED    albuterol-ipratropium (DUO-NEB) 2.5 mg-0.5 mg/3 mL nebulizer solution NEBULIZE 1 VIAL EVERY 4 HOURS AS NEEDED    apixaban (ELIQUIS) 5 mg Tab Take 1 tablet (5 mg total) by mouth 2 (two) times daily.    ascorbic acid (VITAMIN C) 500 MG tablet Take 1,000 mg by mouth once daily.     atorvastatin (LIPITOR) 10 MG tablet Take 1 tablet (10 mg total) by mouth once daily.    azelastine (ASTELIN) 137 mcg nasal spray 1 spray by Nasal route 2 (two) times daily.    bisacodyl (DULCOLAX) 5 mg EC tablet Take 5 mg by mouth daily as needed for Constipation.    budesonide-formoterol 160-4.5 mcg (SYMBICORT) 160-4.5 mcg/actuation HFAA Inhale 2 puffs into the lungs every 12 (twelve) hours. Controller    calcium carbonate (OS-MICHAEL) 600 mg (1,500 mg) Tab Take 1,200 mg by mouth once daily.    calcium carbonate/vitamin D3 (CALTRATE 600 + D ORAL) Take by mouth.    clopidogreL (PLAVIX) 75 mg tablet Take 1 tablet (75 mg total) by mouth once daily.    cyanocobalamin (VITAMIN B-12) 1000 MCG tablet Take 100 mcg by mouth once daily.    diltiaZEM (TIAZAC) 360 MG Cs24 Take 1 capsule (360 mg total) by mouth once daily.    DULoxetine (CYMBALTA) 20 MG capsule TAKE 1 CAPSULE BY MOUTH EVERY DAY    fexofenadine (ALLEGRA) 60 MG tablet Take 180 mg by mouth every morning.     fish oil-omega-3 fatty acids 300-1,000 mg capsule Take 1 g by mouth once daily.     fluticasone (FLONASE) 50 mcg/actuation nasal spray 1 spray by Each Nare route every morning.     folic acid/multivit-min/lutein (CENTRUM SILVER ORAL) Take by mouth.    furosemide (LASIX) 40 MG tablet Take 1 tablet (40 mg total) by mouth once daily. for 3 days    gabapentin (NEURONTIN) 300 MG capsule Take 2 capsules (600 mg total) by mouth 3 (three) times daily. NO FURTHER REFILL WITHOUT APT    isosorbide mononitrate (IMDUR) 30 MG 24 hr tablet Take 1 tablet (30 mg total) by mouth  once daily.    lisinopriL (PRINIVIL,ZESTRIL) 20 MG tablet Take 2 tablets (40 mg total) by mouth once daily.    lorazepam (ATIVAN) 0.5 MG tablet Take 0.5 mg by mouth every morning.     metFORMIN (GLUCOPHAGE) 500 MG tablet Take 500 mg by mouth 2 (two) times daily.    nitroGLYCERIN (NITROSTAT) 0.4 MG SL tablet Place 1 tablet (0.4 mg total) under the tongue every 5 (five) minutes as needed for Chest pain.    ondansetron (ZOFRAN-ODT) 4 MG TbDL DISSOLVE 1 TABLET ON TONGUE EVERY 8 HOURS AS NEEDED FOR NAUSEA    oxybutynin (DITROPAN-XL) 5 MG TR24 Take 5 mg by mouth every other day.     polyethylene glycol (GLYCOLAX) 17 gram PwPk Take by mouth.    POLYSORBATE 80/GLYCERIN (REFRESH DRY EYE THERAPY OPHT) Apply to eye 2 (two) times daily.    potassium gluconate 550 mg (90 mg) Tab Take by mouth. 2 in am and 2 pm    vitamin D (VITAMIN D3) 1000 units Tab Take 2,000 Units by mouth once daily.    zafirlukast (ACCOLATE) 20 MG tablet Take 20 mg by mouth once daily.    diphenhydrAMINE (BENADRYL) 50 MG capsule Take 1 capsule (50 mg total) by mouth every 6 (six) hours as needed for Itching. (Patient not taking: Reported on 11/19/2020)    predniSONE (DELTASONE) 50 MG Tab Take 1 tablet (50 mg total) by mouth once daily. (Patient not taking: Reported on 11/19/2020)     No current facility-administered medications for this visit.      Facility-Administered Medications Ordered in Other Visits   Medication    0.9%  NaCl infusion    sodium chloride 0.9% flush 5 mL       Review of Systems   Constitution: Negative for malaise/fatigue.   Cardiovascular: Positive for chest pain, dyspnea on exertion and palpitations. Negative for irregular heartbeat and leg swelling.   Respiratory: Negative for shortness of breath.    Hematologic/Lymphatic: Negative for bleeding problem.   Skin: Negative for rash.   Musculoskeletal: Negative for myalgias.   Gastrointestinal: Negative for hematemesis, hematochezia and nausea.   Genitourinary: Negative  "for hematuria.   Neurological: Negative for light-headedness.   Psychiatric/Behavioral: Negative for altered mental status. The patient is nervous/anxious.    Allergic/Immunologic: Negative for persistent infections.     Objective:        BP (!) 145/73   Pulse 78   Ht 5' 4" (1.626 m)   Wt 107 kg (235 lb 14.3 oz)   LMP 01/21/1973 (LMP Unknown)   BMI 40.49 kg/m²     Physical Exam   Constitutional: She is oriented to person, place, and time. She appears well-developed and well-nourished.   HENT:   Head: Normocephalic.   Nose: Nose normal.   Eyes: Pupils are equal, round, and reactive to light.   Cardiovascular: Normal rate. An irregularly irregular rhythm present.   Pulmonary/Chest: Breath sounds normal. No respiratory distress.   Abdominal: Normal appearance.   Musculoskeletal: Normal range of motion.         General: No edema.   Neurological: She is alert and oriented to person, place, and time.   Skin: Skin is warm and dry. No erythema.   Psychiatric: She has a normal mood and affect. Her speech is normal and behavior is normal.   Nursing note and vitals reviewed.    Lab Results   Component Value Date     11/19/2020    K 4.2 11/19/2020    MG 1.6 11/10/2020    BUN 21 11/19/2020    CREATININE 0.9 11/19/2020    ALT 30 11/10/2020    AST 23 11/10/2020    HGB 12.2 11/19/2020    HCT 37.7 11/19/2020    HCT 42 11/26/2019    TSH 1.369 11/10/2020    LDLCALC 75.2 08/25/2020       Recent Labs   Lab 12/18/19  1127 02/03/20  0811 05/15/20  0645 10/19/20  0942   INR 1.0 1.3 H 1.1 1.0         Assessment:     1. Chronic atrial fibrillation    2. Other specified cardiac arrhythmias    3. Atrial flutter, unspecified type    4. Essential hypertension    5. Costochondritis    6. Morbid obesity      Plan:     In summary, Ms. Strong is a 75 y.o. female with severe AS s/p TAVR (4/2019), CVA/TIA, AF, DM, HFpEF, HTN here for follow up.   She is here to discuss her atrial fibrillation. She continues to experience palpitations and " chest pain. She says she thought her AF was coming and going with her chest pain. We discussed that is was in likely persistent AF given that she reverted back to AF despite amiodarone. We again reviewed our discussion when she saw me in clinic in September: By her report, she had continued to experience her symptoms of chest pain and palpitations while wearing the event monitor after her cardioversion. She had been in SR at the time.  Her heart rates in AF have overall been controlled on diltiazem. Holter 9/2020 showed well controlled ventricular rates with chest pain reported during controlled rates. Some RVR while hospitalized with asthma attack after receiving steroid treatments 10/2020.   We had a long discussion about her options. Given the lack of improvement of symptoms in sinus rhythm, it is unlikely an ablation would provide relief. Her echo today shows normal heart function and her Holter showed well controlled rates. At this point I do not think a rhythm control strategy is worth the risk of intervention given lack of symptom improvement when in sinus rhythm. She was reassured by our conversation. We discussed reducing her caffeine intake (she drinks several cups of coffee per day) and following up with PCP regarding her costochondritis.  She remains on eliquis for CVA prophylaxis.    Continue current medications.  RTC 1 yr with Holter and echo, sooner if needed.    *A copy of this note has been sent to Dr. Bee*    Follow up in about 1 year (around 11/19/2021).    ------------------------------------------------------------------    SUMIT Lackey, NP-C  Cardiac Electrophysiology

## 2020-11-25 LAB
MISCELLANEOUS TEST NAME: NORMAL
REFERENCE LAB: NORMAL
SPECIMEN TYPE: NORMAL
TEST RESULT: NORMAL

## 2020-11-30 ENCOUNTER — OFFICE VISIT (OUTPATIENT)
Dept: VASCULAR SURGERY | Facility: CLINIC | Age: 75
End: 2020-11-30
Attending: SURGERY
Payer: MEDICARE

## 2020-11-30 ENCOUNTER — HOSPITAL ENCOUNTER (OUTPATIENT)
Dept: RADIOLOGY | Facility: HOSPITAL | Age: 75
Discharge: HOME | End: 2020-11-30
Attending: SURGERY
Payer: MEDICARE

## 2020-11-30 VITALS
TEMPERATURE: 99 F | SYSTOLIC BLOOD PRESSURE: 139 MMHG | BODY MASS INDEX: 43.41 KG/M2 | HEIGHT: 62 IN | DIASTOLIC BLOOD PRESSURE: 62 MMHG | HEART RATE: 70 BPM | WEIGHT: 235.88 LBS

## 2020-11-30 DIAGNOSIS — K55.1 CHRONIC MESENTERIC ISCHEMIA: ICD-10-CM

## 2020-11-30 DIAGNOSIS — K55.069 OCCLUSION OF SUPERIOR MESENTERIC ARTERY: Primary | ICD-10-CM

## 2020-11-30 PROCEDURE — 99211 PR OFFICE/OUTPT VISIT, EST, LEVL I: ICD-10-PCS | Mod: S$GLB,,, | Performed by: SURGERY

## 2020-11-30 PROCEDURE — 1126F PR PAIN SEVERITY QUANTIFIED, NO PAIN PRESENT: ICD-10-PCS | Mod: S$GLB,,, | Performed by: SURGERY

## 2020-11-30 PROCEDURE — 99999 PR PBB SHADOW E&M-EST. PATIENT-LVL IV: CPT | Mod: PBBFAC,,, | Performed by: SURGERY

## 2020-11-30 PROCEDURE — 99211 OFF/OP EST MAY X REQ PHY/QHP: CPT | Mod: S$GLB,,, | Performed by: SURGERY

## 2020-11-30 PROCEDURE — 99999 PR PBB SHADOW E&M-EST. PATIENT-LVL IV: ICD-10-PCS | Mod: PBBFAC,,, | Performed by: SURGERY

## 2020-11-30 PROCEDURE — 3288F PR FALLS RISK ASSESSMENT DOCUMENTED: ICD-10-PCS | Mod: CPTII,S$GLB,, | Performed by: SURGERY

## 2020-11-30 PROCEDURE — 1157F ADVNC CARE PLAN IN RCRD: CPT | Mod: S$GLB,,, | Performed by: SURGERY

## 2020-11-30 PROCEDURE — 74174 CTA ABDOMEN AND PELVIS: ICD-10-PCS | Mod: 26,,, | Performed by: RADIOLOGY

## 2020-11-30 PROCEDURE — 3078F PR MOST RECENT DIASTOLIC BLOOD PRESSURE < 80 MM HG: ICD-10-PCS | Mod: CPTII,S$GLB,, | Performed by: SURGERY

## 2020-11-30 PROCEDURE — 3288F FALL RISK ASSESSMENT DOCD: CPT | Mod: CPTII,S$GLB,, | Performed by: SURGERY

## 2020-11-30 PROCEDURE — 3075F SYST BP GE 130 - 139MM HG: CPT | Mod: CPTII,S$GLB,, | Performed by: SURGERY

## 2020-11-30 PROCEDURE — 74174 CTA ABD&PLVS W/CONTRAST: CPT | Mod: 26,,, | Performed by: RADIOLOGY

## 2020-11-30 PROCEDURE — 1101F PT FALLS ASSESS-DOCD LE1/YR: CPT | Mod: CPTII,S$GLB,, | Performed by: SURGERY

## 2020-11-30 PROCEDURE — 1126F AMNT PAIN NOTED NONE PRSNT: CPT | Mod: S$GLB,,, | Performed by: SURGERY

## 2020-11-30 PROCEDURE — 25500020 PHARM REV CODE 255: Performed by: SURGERY

## 2020-11-30 PROCEDURE — 74174 CTA ABD&PLVS W/CONTRAST: CPT | Mod: TC

## 2020-11-30 PROCEDURE — 3075F PR MOST RECENT SYSTOLIC BLOOD PRESS GE 130-139MM HG: ICD-10-PCS | Mod: CPTII,S$GLB,, | Performed by: SURGERY

## 2020-11-30 PROCEDURE — 3078F DIAST BP <80 MM HG: CPT | Mod: CPTII,S$GLB,, | Performed by: SURGERY

## 2020-11-30 PROCEDURE — 1157F PR ADVANCE CARE PLAN OR EQUIV PRESENT IN MEDICAL RECORD: ICD-10-PCS | Mod: S$GLB,,, | Performed by: SURGERY

## 2020-11-30 PROCEDURE — 1101F PR PT FALLS ASSESS DOC 0-1 FALLS W/OUT INJ PAST YR: ICD-10-PCS | Mod: CPTII,S$GLB,, | Performed by: SURGERY

## 2020-11-30 RX ADMIN — IOHEXOL 100 ML: 350 INJECTION, SOLUTION INTRAVENOUS at 09:11

## 2020-12-01 NOTE — PROGRESS NOTES
"      VASCULAR SURGERY NOTE    Patient ID: Adriana Strong is a 75 y.o. female.    I. HISTORY     Chief Complaint: follow up    HPI: Adriana Strong is a 75 y.o. female with Afib, CHF, asthma, CAD, HLD, HTN, TAVR in 2019 and GERD who is here today for established patient appointment. She has history of LIZ stenting in 2015 with Dr. Hernandez for chronic mesenteric ischemia. She was last seen three weeks ago by me and at that time I wrote the following:     "Since her last appointment, she has developed an aching pain similar to her initial procedure that is on and off for month long stretches first starting 9 months ago. The pain begins subxiphoid and goes down to her umbilicus, then spreads laterally. The pain recently started up in the last week and begins after some meals. Denies food aversion. She endorses nausea, vomiting, and belching after some meals. Laying down and a heating pad makes her pain better. Pain medication improves her pain. She has lost 10 pounds over the past 2 months without trying, but she has also recently started a cardiac diet after being hospitalized."    Since her last appointment her abdominal pain has improved and she has not lost any more weight. She has not had any abdominal pain today or yesterday. She had her CTA abd/pelvis and did not have any issues with that.      Past Medical History:   Diagnosis Date    Acute on chronic diastolic (congestive) heart failure 11/20/2019    Anticoagulant long-term use     on Plavix since May 2015    Anxiety     Arthritis     Asthma     Atrial fibrillation     Atrial fibrillation with RVR 10/19/2020    Cataracts, bilateral     Chest pain, atypical     Chronic atrial fibrillation with rapid ventricular response 6/23/2017    Colon polyp     Coronary artery disease     Diabetes mellitus     Dry eyes     Esophageal erosions     GERD (gastroesophageal reflux disease)     Heart murmur     Hemorrhoid     High cholesterol     Hypertension "     Irritable bowel syndrome     Paroxysmal atrial fibrillation 10/30/2020    Persistent atrial fibrillation 2/10/2020    Shingles 2015    Stenosis of aortic and mitral valves     Stroke     TIA (transient ischemic attack)     Use of cane as ambulatory aid         Past Surgical History:   Procedure Laterality Date    ABDOMINAL SURGERY      stents to SMA    angiocele      2007, 2014    AORTIC VALVE REPLACEMENT N/A 11/19/2019    Procedure: Replacement-valve-aortic;  Surgeon: Jack Capone MD;  Location: [facility] CATH LAB;  Service: Cardiology;  Laterality: N/A;    BREAST SURGERY      left--- a lump--- no cancer    CARDIAC VALVE SURGERY      COLONOSCOPY  2014    COLONOSCOPY N/A 3/14/2018    Procedure: COLONOSCOPY;  Surgeon: Efren Kemp MD;  Location: [facility] ENDO (4TH FLR);  Service: Endoscopy;  Laterality: N/A;  ok to hold Plavix 5 days prior to procedure per Dr RESHMA Hernandez     ok per Dr Kemp to hold Eliquis 2 days prior to procedure (see telephone encounter dated-2/7/18)    HERNIA REPAIR      HYSTERECTOMY  partial    1982 partial hysterectomy    LEFT HEART CATHETERIZATION Right 11/5/2019    Procedure: Left heart cath;  Surgeon: Jack Capone MD;  Location: [facility] CATH LAB;  Service: Cardiology;  Laterality: Right;    left nasal polyp      left neck lymph node      nose polyp      right hip fatty mass tissue      stent to small intestine      SUPERIOR VENA CAVA ANGIOPLASTY / STENTING      TONSILLECTOMY      TOTAL ABDOMINAL HYSTERECTOMY      2014    TOTAL KNEE ARTHROPLASTY Bilateral     TREATMENT OF CARDIAC ARRHYTHMIA N/A 2/10/2020    Procedure: CARDIOVERSION;  Surgeon: Jayy Parrish MD;  Location: [facility] EP LAB;  Service: Cardiology;  Laterality: N/A;  AF, PEDRO, DCCV, MAC, DM, 3 Prep    TREATMENT OF CARDIAC ARRHYTHMIA N/A 5/15/2020    Procedure: CARDIOVERSION;  Surgeon: Hany Bee MD;  Location: [facility] EP LAB;  Service: Cardiology;  Laterality: N/A;  AF, PEDRO, DCCV, MAC, DM, 3 Prep    UPPER  GASTROINTESTINAL ENDOSCOPY  2014       Social History     Tobacco Use   Smoking Status Never Smoker   Smokeless Tobacco Never Used            II. PHYSICAL EXAM     Normal speech, normal affect, obese, in wheelchair  normal respiratory effort/work of breathing      III. ASSESSMENT & PLAN (MEDICAL DECISION MAKING)     No diagnosis found.    Imaging Results:  Mesenteric Duplex 1/13/20:  Elevated velocity in celiac artery with evidence of stenosis. Patent LIZ has chronically elevated proximal velocity which is stable from last exam. Suspect that this is is secondary to the stent and does not represent any hemodynamically significant stenosis.    Mesenteric Duplex 11/9/20:  Poor visualization due to bowel gas.    CTA ABD/PELVIS 11/30/20: Celiac artery origin appears stenotic but motion artifact limits visualization of the proximal celiac trunk. The LIZ stent is widely patent. The SMA is chronically occluded. Compared to her last CTA in 10/22/2019, findings are unchanged.    Assessment/Diagnosis and Plan:  75 y.o. female with h/o SMA occlusion and LIZ stenting. Ultrasound is inconclusive due to patient body habitus and bowel gas. Her SMA stent is widely patent and her abdominal pain has improved.  Inconsistent symptoms suggests that her symptoms are not likely secondary to mesenteric ischemia. Will have her follow up in 1 year for routine surveillance duplex.    -Continue home eliquis   -Continue plavix for stent patency (Rx refilled) indefinitely  -Recommend increasing to high intensity statin (atorvastatin 40mg or rosuvastatin 20mg)   -RTC 12 months for surveillance mesenteric ultrasound    SOFIA Cohen II, MD, [facility]  Vascular Surgeon  [facility]

## 2020-12-08 ENCOUNTER — OFFICE VISIT (OUTPATIENT)
Dept: CARDIOLOGY | Facility: CLINIC | Age: 75
End: 2020-12-08
Payer: MEDICARE

## 2020-12-08 VITALS
DIASTOLIC BLOOD PRESSURE: 72 MMHG | HEART RATE: 77 BPM | SYSTOLIC BLOOD PRESSURE: 133 MMHG | WEIGHT: 236.31 LBS | OXYGEN SATURATION: 93 % | HEIGHT: 62 IN | BODY MASS INDEX: 43.49 KG/M2

## 2020-12-08 DIAGNOSIS — I10 ESSENTIAL HYPERTENSION: ICD-10-CM

## 2020-12-08 DIAGNOSIS — K55.1 MESENTERIC ARTERY INSUFFICIENCY: ICD-10-CM

## 2020-12-08 DIAGNOSIS — E11.42 DIABETIC POLYNEUROPATHY ASSOCIATED WITH TYPE 2 DIABETES MELLITUS: ICD-10-CM

## 2020-12-08 DIAGNOSIS — I25.10 CORONARY ARTERY DISEASE INVOLVING NATIVE CORONARY ARTERY OF NATIVE HEART WITHOUT ANGINA PECTORIS: ICD-10-CM

## 2020-12-08 DIAGNOSIS — I48.20 CHRONIC ATRIAL FIBRILLATION: Primary | ICD-10-CM

## 2020-12-08 DIAGNOSIS — I50.32 CHRONIC DIASTOLIC CHF (CONGESTIVE HEART FAILURE): ICD-10-CM

## 2020-12-08 DIAGNOSIS — J45.30 MILD PERSISTENT CHRONIC ASTHMA WITHOUT COMPLICATION: ICD-10-CM

## 2020-12-08 DIAGNOSIS — K21.9 GASTROESOPHAGEAL REFLUX DISEASE, UNSPECIFIED WHETHER ESOPHAGITIS PRESENT: ICD-10-CM

## 2020-12-08 DIAGNOSIS — M94.0 COSTOCHONDRITIS: ICD-10-CM

## 2020-12-08 DIAGNOSIS — M47.816 LUMBAR SPONDYLOSIS: ICD-10-CM

## 2020-12-08 DIAGNOSIS — E78.00 PURE HYPERCHOLESTEROLEMIA: ICD-10-CM

## 2020-12-08 DIAGNOSIS — Z95.2 S/P TAVR (TRANSCATHETER AORTIC VALVE REPLACEMENT): ICD-10-CM

## 2020-12-08 DIAGNOSIS — Z95.2 HISTORY OF TRANSCATHETER AORTIC VALVE REPLACEMENT (TAVR): ICD-10-CM

## 2020-12-08 DIAGNOSIS — Z86.73 OLD LACUNAR STROKE WITHOUT LATE EFFECT: ICD-10-CM

## 2020-12-08 PROCEDURE — 99999 PR PBB SHADOW E&M-EST. PATIENT-LVL V: ICD-10-PCS | Mod: PBBFAC,,, | Performed by: INTERNAL MEDICINE

## 2020-12-08 PROCEDURE — 1101F PT FALLS ASSESS-DOCD LE1/YR: CPT | Mod: CPTII,S$GLB,, | Performed by: INTERNAL MEDICINE

## 2020-12-08 PROCEDURE — 3288F FALL RISK ASSESSMENT DOCD: CPT | Mod: CPTII,S$GLB,, | Performed by: INTERNAL MEDICINE

## 2020-12-08 PROCEDURE — 1126F AMNT PAIN NOTED NONE PRSNT: CPT | Mod: S$GLB,,, | Performed by: INTERNAL MEDICINE

## 2020-12-08 PROCEDURE — 1159F MED LIST DOCD IN RCRD: CPT | Mod: S$GLB,,, | Performed by: INTERNAL MEDICINE

## 2020-12-08 PROCEDURE — 3078F DIAST BP <80 MM HG: CPT | Mod: CPTII,S$GLB,, | Performed by: INTERNAL MEDICINE

## 2020-12-08 PROCEDURE — 1159F PR MEDICATION LIST DOCUMENTED IN MEDICAL RECORD: ICD-10-PCS | Mod: S$GLB,,, | Performed by: INTERNAL MEDICINE

## 2020-12-08 PROCEDURE — 3288F PR FALLS RISK ASSESSMENT DOCUMENTED: ICD-10-PCS | Mod: CPTII,S$GLB,, | Performed by: INTERNAL MEDICINE

## 2020-12-08 PROCEDURE — 99999 PR PBB SHADOW E&M-EST. PATIENT-LVL V: CPT | Mod: PBBFAC,,, | Performed by: INTERNAL MEDICINE

## 2020-12-08 PROCEDURE — 99214 OFFICE O/P EST MOD 30 MIN: CPT | Mod: 25,S$GLB,, | Performed by: INTERNAL MEDICINE

## 2020-12-08 PROCEDURE — 3075F PR MOST RECENT SYSTOLIC BLOOD PRESS GE 130-139MM HG: ICD-10-PCS | Mod: CPTII,S$GLB,, | Performed by: INTERNAL MEDICINE

## 2020-12-08 PROCEDURE — 3078F PR MOST RECENT DIASTOLIC BLOOD PRESSURE < 80 MM HG: ICD-10-PCS | Mod: CPTII,S$GLB,, | Performed by: INTERNAL MEDICINE

## 2020-12-08 PROCEDURE — 93000 ELECTROCARDIOGRAM COMPLETE: CPT | Mod: S$GLB,,, | Performed by: INTERNAL MEDICINE

## 2020-12-08 PROCEDURE — 1157F PR ADVANCE CARE PLAN OR EQUIV PRESENT IN MEDICAL RECORD: ICD-10-PCS | Mod: S$GLB,,, | Performed by: INTERNAL MEDICINE

## 2020-12-08 PROCEDURE — 93000 EKG 12-LEAD: ICD-10-PCS | Mod: S$GLB,,, | Performed by: INTERNAL MEDICINE

## 2020-12-08 PROCEDURE — 1101F PR PT FALLS ASSESS DOC 0-1 FALLS W/OUT INJ PAST YR: ICD-10-PCS | Mod: CPTII,S$GLB,, | Performed by: INTERNAL MEDICINE

## 2020-12-08 PROCEDURE — 1126F PR PAIN SEVERITY QUANTIFIED, NO PAIN PRESENT: ICD-10-PCS | Mod: S$GLB,,, | Performed by: INTERNAL MEDICINE

## 2020-12-08 PROCEDURE — 3075F SYST BP GE 130 - 139MM HG: CPT | Mod: CPTII,S$GLB,, | Performed by: INTERNAL MEDICINE

## 2020-12-08 PROCEDURE — 99214 PR OFFICE/OUTPT VISIT, EST, LEVL IV, 30-39 MIN: ICD-10-PCS | Mod: 25,S$GLB,, | Performed by: INTERNAL MEDICINE

## 2020-12-08 PROCEDURE — 1157F ADVNC CARE PLAN IN RCRD: CPT | Mod: S$GLB,,, | Performed by: INTERNAL MEDICINE

## 2020-12-08 RX ORDER — FUROSEMIDE 40 MG/1
40 TABLET ORAL DAILY
Qty: 90 TABLET | Refills: 3
Start: 2020-12-08 | End: 2021-01-25

## 2020-12-08 RX ORDER — PANTOPRAZOLE SODIUM 40 MG/1
40 TABLET, DELAYED RELEASE ORAL DAILY
Qty: 90 TABLET | Refills: 3 | Status: SHIPPED | OUTPATIENT
Start: 2020-12-08 | End: 2021-12-24

## 2020-12-08 NOTE — PROGRESS NOTES
Subjective:      Patient ID: Adriana Strong is a 75 y.o. female.    Chief Complaint: Follow-up    HPI:  Breathing / wheezing is doing better--not requiring the aerosol treatments as much.      Pt c/o frequent indigestion and takes Mylanta.  Pt stopped taking the pantoprazole.    Pt is not feeling the atrial fibrillation much--usually only when nervous or anxious or after a big meal.    Pt saw GI and mesenteric stent is OK.    Pt saw Dr Capone.  Echo was good.    Review of Systems   Cardiovascular: Positive for chest pain (attributed to indigestion), dyspnea on exertion and irregular heartbeat. Negative for claudication, leg swelling, near-syncope, orthopnea, palpitations and syncope.      Pt walks very little due to chronic low back pain and right hip pain.    Rides exercise bike at home for 20 minutes twice a day without difficulty      Past Medical History:   Diagnosis Date    Acute on chronic diastolic (congestive) heart failure 11/20/2019    Anticoagulant long-term use     on Plavix since May 2015    Anxiety     Arthritis     Asthma     Atrial fibrillation     Atrial fibrillation with RVR 10/19/2020    Cataracts, bilateral     Chest pain, atypical     Chronic atrial fibrillation with rapid ventricular response 6/23/2017    Colon polyp     Coronary artery disease     Diabetes mellitus     Dry eyes     Esophageal erosions     GERD (gastroesophageal reflux disease)     Heart murmur     Hemorrhoid     High cholesterol     Hypertension     Irritable bowel syndrome     Paroxysmal atrial fibrillation 10/30/2020    Persistent atrial fibrillation 2/10/2020    Shingles 2015    Stenosis of aortic and mitral valves     Stroke     TIA (transient ischemic attack)     Use of cane as ambulatory aid         Past Surgical History:   Procedure Laterality Date    ABDOMINAL SURGERY      stents to SMA    angiocele      2007, 2014    AORTIC VALVE REPLACEMENT N/A 11/19/2019    Procedure:  Replacement-valve-aortic;  Surgeon: Jack Capone MD;  Location: [facility];  Service: Cardiology;  Laterality: N/A;    BREAST SURGERY      left--- a lump--- no cancer    CARDIAC VALVE SURGERY      COLONOSCOPY  2014    COLONOSCOPY N/A 3/14/2018    Procedure: COLONOSCOPY;  Surgeon: Efren Kemp MD;  Location: [facility] END[facility] (4TH FLR);  Service: Endoscopy;  Laterality: N/A;  ok to hold Plavix 5 days prior to procedure per Dr RESHMA Hernandez     ok per Dr Kemp to hold Eliquis 2 days prior to procedure (see telephone encounter dated-2/7/18)    HERNIA REPAIR      HYSTERECTOMY  partial    1982 partial hysterectomy    LEFT HEART CATHETERIZATION Right 11/5/2019    Procedure: Left heart cath;  Surgeon: Jack Capone MD;  Location: [facility]ATH [facility];  Service: Cardiology;  Laterality: Right;    left nasal polyp      left neck lymph node      nose polyp      right hip fatty mass tissue      stent to small intestine      SUPERIOR VENA CAVA ANGIOPLASTY / STENTING      TONSILLECTOMY      TOTAL ABDOMINAL HYSTERECTOMY      2014    TOTAL KNEE ARTHROPLASTY Bilateral     TREATMENT OF CARDIAC ARRHYTHMIA N/A 2/10/2020    Procedure: CARDIOVERSION;  Surgeon: Jayy Parrish MD;  Location: [facility];  Service: Cardiology;  Laterality: N/A;  AF, PEDRO, DCCV, MAC, DM, 3 Prep    TREATMENT OF CARDIAC ARRHYTHMIA N/A 5/15/2020    Procedure: CARDIOVERSION;  Surgeon: Hany Bee MD;  Location: [facility];  Service: Cardiology;  Laterality: N/A;  AF, PEDRO, DCCV, MAC, DM, 3 Prep    UPPER GASTROINTESTINAL ENDOSCOPY  2014       Family History   Problem Relation Age of Onset    Heart attack Mother     Stroke Father     Heart failure Brother     Colon cancer Neg Hx     Inflammatory bowel disease Neg Hx        Social History     Socioeconomic History    Marital status:      Spouse name: Not on file    Number of children: Not on file    Years of education: Not on file    Highest education level: Not on file   Occupational  History    Not on file   Social Needs    Financial resource strain: Not on file    Food insecurity     Worry: Not on file     Inability: Not on file    Transportation needs     Medical: Not on file     Non-medical: Not on file   Tobacco Use    Smoking status: Never Smoker    Smokeless tobacco: Never Used   Substance and Sexual Activity    Alcohol use: No    Drug use: No    Sexual activity: Not on file   Lifestyle    Physical activity     Days per week: Not on file     Minutes per session: Not on file    Stress: Not on file   Relationships    Social connections     Talks on phone: Not on file     Gets together: Not on file     Attends Restoration service: Not on file     Active member of club or organization: Not on file     Attends meetings of clubs or organizations: Not on file     Relationship status: Not on file   Other Topics Concern    Not on file   Social History Narrative    Not on file       Current Outpatient Medications on File Prior to Visit   Medication Sig Dispense Refill    acetaminophen (TYLENOL ARTHRITIS) 650 MG TbSR Take 1,300 mg by mouth 2 (two) times daily.      albuterol (PROVENTIL) 2.5 mg /3 mL (0.083 %) nebulizer solution USE 1 VIAL IN NEBULIZER EVERY 4 6 HOURS AS NEEDED      albuterol-ipratropium (DUO-NEB) 2.5 mg-0.5 mg/3 mL nebulizer solution NEBULIZE 1 VIAL EVERY 4 HOURS AS NEEDED      apixaban (ELIQUIS) 5 mg Tab Take 1 tablet (5 mg total) by mouth 2 (two) times daily. 180 tablet 3    ascorbic acid (VITAMIN C) 500 MG tablet Take 1,000 mg by mouth once daily.       atorvastatin (LIPITOR) 10 MG tablet Take 1 tablet (10 mg total) by mouth once daily. 90 tablet 3    azelastine (ASTELIN) 137 mcg nasal spray 1 spray by Nasal route 2 (two) times daily.      bisacodyl (DULCOLAX) 5 mg EC tablet Take 5 mg by mouth daily as needed for Constipation.      budesonide-formoterol 160-4.5 mcg (SYMBICORT) 160-4.5 mcg/actuation HFAA Inhale 2 puffs into the lungs every 12 (twelve) hours.  Controller      calcium carbonate/vitamin D3 (CALTRATE 600 + D ORAL) Take by mouth.      clopidogreL (PLAVIX) 75 mg tablet Take 1 tablet (75 mg total) by mouth once daily. 90 tablet 3    cyanocobalamin (VITAMIN B-12) 1000 MCG tablet Take 100 mcg by mouth once daily.      diltiaZEM (TIAZAC) 360 MG Cs24 Take 1 capsule (360 mg total) by mouth once daily. 30 capsule 11    DULoxetine (CYMBALTA) 20 MG capsule TAKE 1 CAPSULE BY MOUTH EVERY DAY 90 capsule 3    fexofenadine (ALLEGRA) 60 MG tablet Take 180 mg by mouth every morning.       fish oil-omega-3 fatty acids 300-1,000 mg capsule Take 1 g by mouth once daily.       fluticasone (FLONASE) 50 mcg/actuation nasal spray 1 spray by Each Nare route every morning.       folic acid/multivit-min/lutein (CENTRUM SILVER ORAL) Take by mouth.      gabapentin (NEURONTIN) 300 MG capsule Take 2 capsules (600 mg total) by mouth 3 (three) times daily. NO FURTHER REFILL WITHOUT  capsule 0    isosorbide mononitrate (IMDUR) 30 MG 24 hr tablet Take 1 tablet (30 mg total) by mouth once daily. 90 tablet 3    lisinopriL (PRINIVIL,ZESTRIL) 20 MG tablet Take 2 tablets (40 mg total) by mouth once daily. 180 tablet 3    lorazepam (ATIVAN) 0.5 MG tablet Take 0.5 mg by mouth every morning.       metFORMIN (GLUCOPHAGE) 500 MG tablet Take 500 mg by mouth 2 (two) times daily.  1    nitroGLYCERIN (NITROSTAT) 0.4 MG SL tablet Place 1 tablet (0.4 mg total) under the tongue every 5 (five) minutes as needed for Chest pain. 30 tablet 11    ondansetron (ZOFRAN-ODT) 4 MG TbDL DISSOLVE 1 TABLET ON TONGUE EVERY 8 HOURS AS NEEDED FOR NAUSEA  1    oxybutynin (DITROPAN-XL) 5 MG TR24 Take 5 mg by mouth every other day.       POLYSORBATE 80/GLYCERIN (REFRESH DRY EYE THERAPY OPHT) Apply to eye 2 (two) times daily.      potassium gluconate 550 mg (90 mg) Tab Take by mouth. 2 in am and 2 pm      vitamin D (VITAMIN D3) 1000 units Tab Take 2,000 Units by mouth once daily.      zafirlukast  "(ACCOLATE) 20 MG tablet Take 20 mg by mouth once daily.  3    [DISCONTINUED] furosemide (LASIX) 40 MG tablet Take 1 tablet (40 mg total) by mouth once daily. for 3 days 3 tablet 0    diphenhydrAMINE (BENADRYL) 50 MG capsule Take 1 capsule (50 mg total) by mouth every 6 (six) hours as needed for Itching. 1 capsule 0    polyethylene glycol (GLYCOLAX) 17 gram PwPk Take by mouth.      [DISCONTINUED] calcium carbonate (OS-MICHAEL) 600 mg (1,500 mg) Tab Take 1,200 mg by mouth once daily.      [DISCONTINUED] predniSONE (DELTASONE) 50 MG Tab Take 1 tablet (50 mg total) by mouth once daily. (Patient not taking: Reported on 12/8/2020) 3 tablet 0     Current Facility-Administered Medications on File Prior to Visit   Medication Dose Route Frequency Provider Last Rate Last Dose    0.9%  NaCl infusion   Intravenous Continuous Lynette Hightower NP        sodium chloride 0.9% flush 5 mL  5 mL Intravenous PRN Lynette Hightower NP           Review of patient's allergies indicates:   Allergen Reactions    Celebrex [celecoxib] Shortness Of Breath    Ciprofloxacin Swelling     lip    Dicyclomine Hives    Adhesive Dermatitis    Avelox [moxifloxacin] Swelling    Cilostazol Other (See Comments)     Elevates blood pressure    Eryc [erythromycin] Other (See Comments)     unknown    Iodine and iodide containing products Hives    Keflex [cephalexin] Hives    Meclomen      rashes    Meloxicam      Ear ringing    Metoclopramide Other (See Comments)     High blood pressure    Sulfa (sulfonamide antibiotics) Itching     Objective:     Vitals:    12/08/20 1007   BP: 133/72   BP Location: Left arm   Patient Position: Sitting   BP Method: Thigh Cuff (Automatic)   Pulse: 77   SpO2: (!) 93%   Weight: 107.2 kg (236 lb 5.3 oz)   Height: 5' 2" (1.575 m)        Physical Exam   Constitutional: She is oriented to person, place, and time. She appears well-developed and well-nourished.   Eyes: No scleral icterus.   Neck: No JVD present. " Carotid bruit is not present.   Cardiovascular: Normal rate. An irregularly irregular rhythm present. Exam reveals no gallop.   No murmur heard.  Pulmonary/Chest: Breath sounds normal.   Musculoskeletal:         General: No edema.   Neurological: She is alert and oriented to person, place, and time.   Skin: Skin is warm and dry.   Psychiatric: She has a normal mood and affect. Her behavior is normal. Judgment and thought content normal.   Vitals reviewed.       ECG today reviewed by me: a fib with vent response 98 bpm    Recent echo for Dr Liborio lagosed renita LVEF and normally functioning TAVR    Note cor angio last year prior to TAVR showed a 70% LAD stenosis best treated medically    Hospital Outpatient Visit on 11/19/2020   Component Date Value Ref Range Status    BSA 11/19/2020 2.19  m2 Final    TDI SEPTAL 11/19/2020 0.07  m/s Final    LV LATERAL E/E' RATIO 11/19/2020 10.89  m/s Final    LV SEPTAL E/E' RATIO 11/19/2020 14.00  m/s Final    LA WIDTH 11/19/2020 4.95  cm Final    TDI LATERAL 11/19/2020 0.09  m/s Final    LVIDd 11/19/2020 4.43  3.5 - 6.0 cm Final    IVS 11/19/2020 1.07  0.6 - 1.1 cm Final    Posterior Wall 11/19/2020 1.08  0.6 - 1.1 cm Final    LVIDs 11/19/2020 2.96  2.1 - 4.0 cm Final    FS 11/19/2020 33  28 - 44 % Final    LA volume 11/19/2020 117.41  cm3 Final    Sinus 11/19/2020 3.46  cm Final    STJ 11/19/2020 2.85  cm Final    Ascending aorta 11/19/2020 3.21  cm Final    LV mass 11/19/2020 165.30  g Final    LA size 11/19/2020 4.44  cm Final    RVDD 11/19/2020 3.14  cm Final    TAPSE 11/19/2020 1.75  cm Final    Left Ventricle Relative Wall Thick* 11/19/2020 0.49  cm Final    AV mean gradient 11/19/2020 10  mmHg Final    AV valve area 11/19/2020 1.44  cm2 Final    AV Velocity Ratio 11/19/2020 0.37   Final    AV index (prosthetic) 11/19/2020 0.39   Final    Mean e' 11/19/2020 0.08  m/s Final    IVRT 11/19/2020 76.12  msec Final    Pulm vein S/D ratio 11/19/2020 0.34    Final    LVOT diameter 11/19/2020 2.17  cm Final    LVOT area 11/19/2020 3.7  cm2 Final    LVOT peak mazin 11/19/2020 0.82  m/s Final    LVOT peak VTI 11/19/2020 18.27  cm Final    Ao peak mazin 11/19/2020 2.24  m/s Final    Ao VTI 11/19/2020 46.88  cm Final    LVOT stroke volume 11/19/2020 67.53  cm3 Final    AV peak gradient 11/19/2020 20  mmHg Final    E/E' ratio 11/19/2020 12.25  m/s Final    MV Peak E Mazin 11/19/2020 0.98  m/s Final    TR Max Mazin 11/19/2020 2.53  m/s Final    PV Peak S Mazin 11/19/2020 0.25  m/s Final    PV Peak D Mazin 11/19/2020 0.74  m/s Final    LV Systolic Volume 11/19/2020 33.87  mL Final    LV Systolic Volume Index 11/19/2020 16.2  mL/m2 Final    LV Diastolic Volume 11/19/2020 89.01  mL Final    LV Diastolic Volume Index 11/19/2020 42.57  mL/m2 Final    LA Volume Index 11/19/2020 56.1  mL/m2 Final    LV Mass Index 11/19/2020 79  g/m2 Final    RA Major Axis 11/19/2020 5.05  cm Final    Left Atrium Minor Axis 11/19/2020 6.25  cm Final    Left Atrium Major Axis 11/19/2020 6.32  cm Final    Triscuspid Valve Regurgitation Pea* 11/19/2020 26  mmHg Final    RA Width 11/19/2020 3.68  cm Final    Right Atrial Pressure (from IVC) 11/19/2020 3  mmHg Final    TV rest pulmonary artery pressure 11/19/2020 29  mmHg Final   Lab Visit on 11/19/2020   Component Date Value Ref Range Status    WBC 11/19/2020 7.66  3.90 - 12.70 K/uL Final    RBC 11/19/2020 4.09  4.00 - 5.40 M/uL Final    Hemoglobin 11/19/2020 12.8  12.0 - 16.0 g/dL Final    Hematocrit 11/19/2020 38.8  37.0 - 48.5 % Final    MCV 11/19/2020 95  82 - 98 fL Final    MCH 11/19/2020 31.3* 27.0 - 31.0 pg Final    MCHC 11/19/2020 33.0  32.0 - 36.0 g/dL Final    RDW 11/19/2020 14.7* 11.5 - 14.5 % Final    Platelets 11/19/2020 434* 150 - 350 K/uL Final    MPV 11/19/2020 9.3  9.2 - 12.9 fL Final    Immature Granulocytes 11/19/2020 1.4* 0.0 - 0.5 % Final    Gran # (ANC) 11/19/2020 3.6  1.8 - 7.7 K/uL Final    Immature Grans  (Abs) 11/19/2020 0.11* 0.00 - 0.04 K/uL Final    Lymph # 11/19/2020 2.9  1.0 - 4.8 K/uL Final    Mono # 11/19/2020 0.9  0.3 - 1.0 K/uL Final    Eos # 11/19/2020 0.0  0.0 - 0.5 K/uL Final    Baso # 11/19/2020 0.06  0.00 - 0.20 K/uL Final    nRBC 11/19/2020 0  0 /100 WBC Final    Gran % 11/19/2020 46.9  38.0 - 73.0 % Final    Lymph % 11/19/2020 38.3  18.0 - 48.0 % Final    Mono % 11/19/2020 12.3  4.0 - 15.0 % Final    Eosinophil % 11/19/2020 0.3  0.0 - 8.0 % Final    Basophil % 11/19/2020 0.8  0.0 - 1.9 % Final    Differential Method 11/19/2020 Automated   Final    Miscellaneous Test Name 11/19/2020 Respiratory allergy profile region VI   Final    Specimen Type 11/19/2020 Blood   Final    Test Result 11/19/2020 See result image under hyperlink   Final    Reference Lab 11/19/2020 SEE COMMENT   Final   Lab Visit on 11/19/2020   Component Date Value Ref Range Status    Sodium 11/19/2020 136  136 - 145 mmol/L Final    Potassium 11/19/2020 4.2  3.5 - 5.1 mmol/L Final    Chloride 11/19/2020 94* 95 - 110 mmol/L Final    CO2 11/19/2020 32* 23 - 29 mmol/L Final    Glucose 11/19/2020 109  70 - 110 mg/dL Final    BUN 11/19/2020 21  8 - 23 mg/dL Final    Creatinine 11/19/2020 0.9  0.5 - 1.4 mg/dL Final    Calcium 11/19/2020 9.2  8.7 - 10.5 mg/dL Final    Anion Gap 11/19/2020 10  8 - 16 mmol/L Final    eGFR if African American 11/19/2020 >60.0  >60 mL/min/1.73 m^2 Final    eGFR if non African American 11/19/2020 >60.0  >60 mL/min/1.73 m^2 Final    WBC 11/19/2020 8.98  3.90 - 12.70 K/uL Final    RBC 11/19/2020 3.85* 4.00 - 5.40 M/uL Final    Hemoglobin 11/19/2020 12.2  12.0 - 16.0 g/dL Final    Hematocrit 11/19/2020 37.7  37.0 - 48.5 % Final    MCV 11/19/2020 98  82 - 98 fL Final    MCH 11/19/2020 31.7* 27.0 - 31.0 pg Final    MCHC 11/19/2020 32.4  32.0 - 36.0 g/dL Final    RDW 11/19/2020 14.8* 11.5 - 14.5 % Final    Platelets 11/19/2020 429* 150 - 350 K/uL Final    MPV 11/19/2020 9.4  9.2 - 12.9  fL Final    Immature Granulocytes 11/19/2020 1.2* 0.0 - 0.5 % Final    Gran # (ANC) 11/19/2020 4.7  1.8 - 7.7 K/uL Final    Immature Grans (Abs) 11/19/2020 0.11* 0.00 - 0.04 K/uL Final    Lymph # 11/19/2020 3.1  1.0 - 4.8 K/uL Final    Mono # 11/19/2020 1.1* 0.3 - 1.0 K/uL Final    Eos # 11/19/2020 0.0  0.0 - 0.5 K/uL Final    Baso # 11/19/2020 0.05  0.00 - 0.20 K/uL Final    nRBC 11/19/2020 0  0 /100 WBC Final    Gran % 11/19/2020 52.2  38.0 - 73.0 % Final    Lymph % 11/19/2020 34.1  18.0 - 48.0 % Final    Mono % 11/19/2020 11.8  4.0 - 15.0 % Final    Eosinophil % 11/19/2020 0.1  0.0 - 8.0 % Final    Basophil % 11/19/2020 0.6  0.0 - 1.9 % Final    Differential Method 11/19/2020 Automated   Final    Albumin 11/19/2020 3.4* 3.5 - 5.2 g/dL Final   Hospital Outpatient Visit on 11/17/2020   Component Date Value Ref Range Status    Pre FVC 11/17/2020 1.72* 1.85 - 3.39 L Preliminary    Pre FEV1 11/17/2020 1.23* 1.41 - 2.59 L Preliminary    Pre FEV1 FVC 11/17/2020 71.53  62.85 - 91.55 % Preliminary    Pre FEF 25 75 11/17/2020 0.76  0.69 - 2.55 L/s Preliminary    Pre PEF 11/17/2020 4.31  3.38 - 6.82 L/s Preliminary    Pre  11/17/2020 8.11  sec Preliminary    Pre DLCO 11/17/2020 12.71* 14.63 - 26.10 ml/(min*mmHg) Preliminary    DLCO ADJ PRE 11/17/2020 13.22* 14.63 - 26.10 ml/(min*mmHg) Preliminary    DLCOVA PRE 11/17/2020 3.97  2.68 - 5.51 ml/(min*mmHg*L) Preliminary    DLVAAdj PRE 11/17/2020 4.13  2.68 - 5.51 ml/(min*mmHg*L) Preliminary    VA PRE 11/17/2020 3.20* 4.82 - 4.82 L Preliminary    IVC PRE 11/17/2020 1.75* 1.85 - 3.39 L Preliminary    TLCN2 PRE 11/17/2020 3.29* 3.98 - 5.96 L Preliminary    VCMAXN2 PRE 11/17/2020 1.72* 1.85 - 3.39 L Preliminary    Pre FRC N2 11/17/2020 1.52  L Preliminary    RVN2 PRE 11/17/2020 1.57* 1.61 - 2.76 L Preliminary    [id number] PRE 11/17/2020 47.69  35.55 - 54.73 % Preliminary    FVC Ref 11/17/2020 2.62   Preliminary    FVC LLN 11/17/2020 1.85    Preliminary    FVC Pre Ref 11/17/2020 65.7  % Preliminary    FEV1 Ref 11/17/2020 2.00   Preliminary    FEV1 LLN 11/17/2020 1.41   Preliminary    FEV1 Pre Ref 11/17/2020 61.6  % Preliminary    FEV1 FVC Ref 11/17/2020 77   Preliminary    FEV1 FVC LLN 11/17/2020 63   Preliminary    FEV1 FVC Pre Ref 11/17/2020 92.7  % Preliminary    FEF 25 75 Ref 11/17/2020 1.62   Preliminary    FEF 25 75 LLN 11/17/2020 0.69   Preliminary    FEF 25 75 Pre Ref 11/17/2020 46.8  % Preliminary    PEF Ref 11/17/2020 5.10   Preliminary    PEF LLN 11/17/2020 3.38   Preliminary    PEF Pre Ref 11/17/2020 84.6  % Preliminary    TLCN2 Ref 11/17/2020 4.97   Preliminary    TLCN2 LLN 11/17/2020 3.98   Preliminary    TLCN2 Pre Ref 11/17/2020 66.2  % Preliminary    VCMAXN2 Ref 11/17/2020 2.62   Preliminary    VCMAXN2 LLN 11/17/2020 1.85   Preliminary    VCMAXN2 Pre Ref 11/17/2020 65.7  % Preliminary    FRCN2 Ref 11/17/2020 2.73   Preliminary    FRCN2 LLN 11/17/2020 1.91   Preliminary    FRCN2 Pre Ref 11/17/2020 55.6  % Preliminary    RVN2 Ref 11/17/2020 2.18   Preliminary    RVN2 LLN 11/17/2020 1.61   Preliminary    RVN2 Pre Ref 11/17/2020 71.9  % Preliminary    [id number] Ref 11/17/2020 45.14   Preliminary    [id number] LLN 11/17/2020 35.55   Preliminary    [id number] Pre Ref 11/17/2020 105.6  % Preliminary    DLCO Single Breath Ref 11/17/2020 20.37   Preliminary    DLCO Single Breath LLN 11/17/2020 14.63   Preliminary    DLCO Single Breath Pre Ref 11/17/2020 62.4  % Preliminary    DLCOc Single Breath Ref 11/17/2020 20.37   Preliminary    DLCOc Single Breath LLN 11/17/2020 14.63   Preliminary    DLCOc Single Breath Pre Ref 11/17/2020 64.9  % Preliminary    DLCOVA Ref 11/17/2020 4.10   Preliminary    DLCOVA LLN 11/17/2020 2.68   Preliminary    DLCOVA Pre Ref 11/17/2020 96.8  % Preliminary    DLCOc SBVA Ref 11/17/2020 4.10   Preliminary    DLCOc SBVA LLN 11/17/2020 2.68   Preliminary    DLCOc SBVA Pre Ref 11/17/2020  100.7  % Preliminary    VA Single Breath Ref 11/17/2020 4.82   Preliminary    VA Single Breath LLN 11/17/2020 4.82   Preliminary    VA Single Breath Pre Ref 11/17/2020 66.5  % Preliminary    IVC Single Breath Ref 11/17/2020 2.62   Preliminary    IVC Single Breath LLN 11/17/2020 1.85   Preliminary    IVC Single Breath Pre Ref 11/17/2020 66.9  % Preliminary   Admission on 11/10/2020, Discharged on 11/10/2020   Component Date Value Ref Range Status    WBC 11/10/2020 7.01  3.90 - 12.70 K/uL Final    RBC 11/10/2020 3.92* 4.00 - 5.40 M/uL Final    Hemoglobin 11/10/2020 12.2  12.0 - 16.0 g/dL Final    Hematocrit 11/10/2020 37.6  37.0 - 48.5 % Final    MCV 11/10/2020 96  82 - 98 fL Final    MCH 11/10/2020 31.1* 27.0 - 31.0 pg Final    MCHC 11/10/2020 32.4  32.0 - 36.0 g/dL Final    RDW 11/10/2020 14.9* 11.5 - 14.5 % Final    Platelets 11/10/2020 237  150 - 350 K/uL Final    MPV 11/10/2020 9.5  9.2 - 12.9 fL Final    Immature Granulocytes 11/10/2020 0.6* 0.0 - 0.5 % Final    Gran # (ANC) 11/10/2020 4.1  1.8 - 7.7 K/uL Final    Immature Grans (Abs) 11/10/2020 0.04  0.00 - 0.04 K/uL Final    Lymph # 11/10/2020 2.0  1.0 - 4.8 K/uL Final    Mono # 11/10/2020 0.8  0.3 - 1.0 K/uL Final    Eos # 11/10/2020 0.0  0.0 - 0.5 K/uL Final    Baso # 11/10/2020 0.03  0.00 - 0.20 K/uL Final    nRBC 11/10/2020 0  0 /100 WBC Final    Gran % 11/10/2020 58.9  38.0 - 73.0 % Final    Lymph % 11/10/2020 28.8  18.0 - 48.0 % Final    Mono % 11/10/2020 11.0  4.0 - 15.0 % Final    Eosinophil % 11/10/2020 0.3  0.0 - 8.0 % Final    Basophil % 11/10/2020 0.4  0.0 - 1.9 % Final    Differential Method 11/10/2020 Automated   Final    Sodium 11/10/2020 132* 136 - 145 mmol/L Final    Potassium 11/10/2020 4.2  3.5 - 5.1 mmol/L Final    Chloride 11/10/2020 92* 95 - 110 mmol/L Final    CO2 11/10/2020 29  23 - 29 mmol/L Final    Glucose 11/10/2020 100  70 - 110 mg/dL Final    BUN 11/10/2020 17  8 - 23 mg/dL Final    Creatinine  11/10/2020 0.9  0.5 - 1.4 mg/dL Final    Calcium 11/10/2020 9.8  8.7 - 10.5 mg/dL Final    Total Protein 11/10/2020 6.5  6.0 - 8.4 g/dL Final    Albumin 11/10/2020 3.3* 3.5 - 5.2 g/dL Final    Total Bilirubin 11/10/2020 0.3  0.1 - 1.0 mg/dL Final    Alkaline Phosphatase 11/10/2020 43* 55 - 135 U/L Final    AST 11/10/2020 23  10 - 40 U/L Final    ALT 11/10/2020 30  10 - 44 U/L Final    Anion Gap 11/10/2020 11  8 - 16 mmol/L Final    eGFR if African American 11/10/2020 >60.0  >60 mL/min/1.73 m^2 Final    eGFR if non African American 11/10/2020 >60.0  >60 mL/min/1.73 m^2 Final    Troponin I 11/10/2020 <0.006  0.000 - 0.026 ng/mL Final    Troponin I 11/10/2020 <0.006  0.000 - 0.026 ng/mL Final    BNP 11/10/2020 121* 0 - 99 pg/mL Final    Magnesium 11/10/2020 1.6  1.6 - 2.6 mg/dL Final    TSH 11/10/2020 1.369  0.400 - 4.000 uIU/mL Final    POC Rapid COVID 11/10/2020 Negative  Negative Final     Acceptable 11/10/2020 Yes   Final    Troponin I 11/10/2020 <0.006  0.000 - 0.026 ng/mL Final   Admission on 10/29/2020, Discharged on 10/30/2020   Component Date Value Ref Range Status    WBC 10/29/2020 17.56* 3.90 - 12.70 K/uL Final    RBC 10/29/2020 4.67  4.00 - 5.40 M/uL Final    Hemoglobin 10/29/2020 14.6  12.0 - 16.0 g/dL Final    Hematocrit 10/29/2020 42.5  37.0 - 48.5 % Final    MCV 10/29/2020 91  82 - 98 fL Final    MCH 10/29/2020 31.3* 27.0 - 31.0 pg Final    MCHC 10/29/2020 34.4  32.0 - 36.0 g/dL Final    RDW 10/29/2020 14.2  11.5 - 14.5 % Final    Platelets 10/29/2020 265  150 - 350 K/uL Final    MPV 10/29/2020 9.9  9.2 - 12.9 fL Final    Immature Granulocytes 10/29/2020 2.6* 0.0 - 0.5 % Final    Gran # (ANC) 10/29/2020 15.8* 1.8 - 7.7 K/uL Final    Immature Grans (Abs) 10/29/2020 0.45* 0.00 - 0.04 K/uL Final    Lymph # 10/29/2020 0.9* 1.0 - 4.8 K/uL Final    Mono # 10/29/2020 0.4  0.3 - 1.0 K/uL Final    Eos # 10/29/2020 0.0  0.0 - 0.5 K/uL Final    Baso # 10/29/2020  0.03  0.00 - 0.20 K/uL Final    nRBC 10/29/2020 0  0 /100 WBC Final    Gran % 10/29/2020 89.8* 38.0 - 73.0 % Final    Lymph % 10/29/2020 5.2* 18.0 - 48.0 % Final    Mono % 10/29/2020 2.2* 4.0 - 15.0 % Final    Eosinophil % 10/29/2020 0.0  0.0 - 8.0 % Final    Basophil % 10/29/2020 0.2  0.0 - 1.9 % Final    Differential Method 10/29/2020 Automated   Final    Sodium 10/29/2020 129* 136 - 145 mmol/L Final    Potassium 10/29/2020 4.7  3.5 - 5.1 mmol/L Final    Chloride 10/29/2020 90* 95 - 110 mmol/L Final    CO2 10/29/2020 26  23 - 29 mmol/L Final    Glucose 10/29/2020 305* 70 - 110 mg/dL Final    BUN 10/29/2020 21  8 - 23 mg/dL Final    Creatinine 10/29/2020 1.0  0.5 - 1.4 mg/dL Final    Calcium 10/29/2020 8.8  8.7 - 10.5 mg/dL Final    Total Protein 10/29/2020 6.7  6.0 - 8.4 g/dL Final    Albumin 10/29/2020 3.9  3.5 - 5.2 g/dL Final    Total Bilirubin 10/29/2020 0.6  0.1 - 1.0 mg/dL Final    Alkaline Phosphatase 10/29/2020 40* 55 - 135 U/L Final    AST 10/29/2020 28  10 - 40 U/L Final    ALT 10/29/2020 58* 10 - 44 U/L Final    Anion Gap 10/29/2020 13  8 - 16 mmol/L Final    eGFR if African American 10/29/2020 >60  >60 mL/min/1.73 m^2 Final    eGFR if non African American 10/29/2020 54* >60 mL/min/1.73 m^2 Final    Troponin I 10/29/2020 0.008  0.000 - 0.026 ng/mL Final    BNP 10/29/2020 59  0 - 99 pg/mL Final    SARS-CoV-2 RNA, Amplification, Qual 10/29/2020 Negative  Negative Final    Troponin I 10/29/2020 <0.006  0.000 - 0.026 ng/mL Final    POCT Glucose 10/29/2020 195* 70 - 110 mg/dL Final    Troponin I 10/29/2020 0.009  0.000 - 0.026 ng/mL Final    POCT Glucose 10/29/2020 132* 70 - 110 mg/dL Final    WBC 10/30/2020 15.67* 3.90 - 12.70 K/uL Final    RBC 10/30/2020 4.32  4.00 - 5.40 M/uL Final    Hemoglobin 10/30/2020 13.5  12.0 - 16.0 g/dL Final    Hematocrit 10/30/2020 40.0  37.0 - 48.5 % Final    MCV 10/30/2020 93  82 - 98 fL Final    MCH 10/30/2020 31.3* 27.0 - 31.0 pg Final     MCHC 10/30/2020 33.8  32.0 - 36.0 g/dL Final    RDW 10/30/2020 14.6* 11.5 - 14.5 % Final    Platelets 10/30/2020 221  150 - 350 K/uL Final    MPV 10/30/2020 9.8  9.2 - 12.9 fL Final    Immature Granulocytes 10/30/2020 1.6* 0.0 - 0.5 % Final    Gran # (ANC) 10/30/2020 9.6* 1.8 - 7.7 K/uL Final    Immature Grans (Abs) 10/30/2020 0.25* 0.00 - 0.04 K/uL Final    Lymph # 10/30/2020 4.7  1.0 - 4.8 K/uL Final    Mono # 10/30/2020 1.0  0.3 - 1.0 K/uL Final    Eos # 10/30/2020 0.0  0.0 - 0.5 K/uL Final    Baso # 10/30/2020 0.03  0.00 - 0.20 K/uL Final    nRBC 10/30/2020 0  0 /100 WBC Final    Gran % 10/30/2020 61.4  38.0 - 73.0 % Final    Lymph % 10/30/2020 30.1  18.0 - 48.0 % Final    Mono % 10/30/2020 6.4  4.0 - 15.0 % Final    Eosinophil % 10/30/2020 0.3  0.0 - 8.0 % Final    Basophil % 10/30/2020 0.2  0.0 - 1.9 % Final    Differential Method 10/30/2020 Automated   Final    Sodium 10/30/2020 136  136 - 145 mmol/L Final    Potassium 10/30/2020 4.1  3.5 - 5.1 mmol/L Final    Chloride 10/30/2020 98  95 - 110 mmol/L Final    CO2 10/30/2020 30* 23 - 29 mmol/L Final    Glucose 10/30/2020 109  70 - 110 mg/dL Final    BUN 10/30/2020 17  8 - 23 mg/dL Final    Creatinine 10/30/2020 0.8  0.5 - 1.4 mg/dL Final    Calcium 10/30/2020 8.8  8.7 - 10.5 mg/dL Final    Total Protein 10/30/2020 5.8* 6.0 - 8.4 g/dL Final    Albumin 10/30/2020 3.3* 3.5 - 5.2 g/dL Final    Total Bilirubin 10/30/2020 0.6  0.1 - 1.0 mg/dL Final    Alkaline Phosphatase 10/30/2020 32* 55 - 135 U/L Final    AST 10/30/2020 21  10 - 40 U/L Final    ALT 10/30/2020 44  10 - 44 U/L Final    Anion Gap 10/30/2020 8  8 - 16 mmol/L Final    eGFR if African American 10/30/2020 >60  >60 mL/min/1.73 m^2 Final    eGFR if non African American 10/30/2020 >60  >60 mL/min/1.73 m^2 Final    POCT Glucose 10/30/2020 110  70 - 110 mg/dL Final    POCT Glucose 10/30/2020 132* 70 - 110 mg/dL Final   Admission on 10/19/2020, Discharged on 10/21/2020    Component Date Value Ref Range Status    aPTT 10/19/2020 36.2* 21.0 - 32.0 sec Final    WBC 10/19/2020 19.51* 3.90 - 12.70 K/uL Final    RBC 10/19/2020 4.80  4.00 - 5.40 M/uL Final    Hemoglobin 10/19/2020 15.0  12.0 - 16.0 g/dL Final    Hematocrit 10/19/2020 44.0  37.0 - 48.5 % Final    MCV 10/19/2020 92  82 - 98 fL Final    MCH 10/19/2020 31.3* 27.0 - 31.0 pg Final    MCHC 10/19/2020 34.1  32.0 - 36.0 g/dL Final    RDW 10/19/2020 14.4  11.5 - 14.5 % Final    Platelets 10/19/2020 360* 150 - 350 K/uL Final    MPV 10/19/2020 9.6  9.2 - 12.9 fL Final    Immature Granulocytes 10/19/2020 0.5  0.0 - 0.5 % Final    Gran # (ANC) 10/19/2020 12.5* 1.8 - 7.7 K/uL Final    Immature Grans (Abs) 10/19/2020 0.10* 0.00 - 0.04 K/uL Final    Lymph # 10/19/2020 5.0* 1.0 - 4.8 K/uL Final    Mono # 10/19/2020 1.8* 0.3 - 1.0 K/uL Final    Eos # 10/19/2020 0.0  0.0 - 0.5 K/uL Final    Baso # 10/19/2020 0.05  0.00 - 0.20 K/uL Final    nRBC 10/19/2020 0  0 /100 WBC Final    Gran % 10/19/2020 64.1  38.0 - 73.0 % Final    Lymph % 10/19/2020 25.7  18.0 - 48.0 % Final    Mono % 10/19/2020 9.3  4.0 - 15.0 % Final    Eosinophil % 10/19/2020 0.1  0.0 - 8.0 % Final    Basophil % 10/19/2020 0.3  0.0 - 1.9 % Final    Differential Method 10/19/2020 Automated   Final    Sodium 10/19/2020 134* 136 - 145 mmol/L Final    Potassium 10/19/2020 4.2  3.5 - 5.1 mmol/L Final    Chloride 10/19/2020 90* 95 - 110 mmol/L Final    CO2 10/19/2020 30* 23 - 29 mmol/L Final    Glucose 10/19/2020 96  70 - 110 mg/dL Final    BUN 10/19/2020 20  8 - 23 mg/dL Final    Creatinine 10/19/2020 0.9  0.5 - 1.4 mg/dL Final    Calcium 10/19/2020 10.1  8.7 - 10.5 mg/dL Final    Total Protein 10/19/2020 8.3  6.0 - 8.4 g/dL Final    Albumin 10/19/2020 4.3  3.5 - 5.2 g/dL Final    Total Bilirubin 10/19/2020 0.5  0.1 - 1.0 mg/dL Final    Alkaline Phosphatase 10/19/2020 47* 55 - 135 U/L Final    AST 10/19/2020 45* 10 - 40 U/L Final    ALT  10/19/2020 55* 10 - 44 U/L Final    Anion Gap 10/19/2020 14  8 - 16 mmol/L Final    eGFR if African American 10/19/2020 >60  >60 mL/min/1.73 m^2 Final    eGFR if non African American 10/19/2020 >60  >60 mL/min/1.73 m^2 Final    BNP 10/19/2020 90  0 - 99 pg/mL Final    Magnesium 10/19/2020 1.8  1.6 - 2.6 mg/dL Final    Troponin I 10/19/2020 0.008  0.000 - 0.026 ng/mL Final    Prothrombin Time 10/19/2020 10.9  9.0 - 12.5 sec Final    INR 10/19/2020 1.0  0.8 - 1.2 Final    SARS-CoV-2 RNA, Amplification, Qual 10/19/2020 Negative  Negative Final    Magnesium 10/19/2020 2.0  1.6 - 2.6 mg/dL Final    Respiratory Culture 10/20/2020 No S aureus or Pseudomonas isolated.   Final    Respiratory Culture 10/20/2020 *  Final                    Value:MORAXELLA (BRANHAMELLA) CATARRHALIS  Many  Beta Lactamase positive  Normal respiratory manuel also present      Gram Stain (Respiratory) 10/20/2020 <10 epithelial cells per low power field.   Final    Gram Stain (Respiratory) 10/20/2020 Rare WBC's   Final    Gram Stain (Respiratory) 10/20/2020 Moderate Gram positive cocci   Final    Gram Stain (Respiratory) 10/20/2020 Moderate Gram positive rods   Final    Gram Stain (Respiratory) 10/20/2020 Moderate Gram negative rods   Final    Gram Stain (Respiratory) 10/20/2020 Moderate Gram negative diplococci   Final    Gram Stain Result 10/21/2020 Rare WBC's   Final    Gram Stain Result 10/21/2020 Many Gram positive cocci    Final    Gram Stain Result 10/21/2020 Rare Gram negative rods   Final    Procalcitonin 10/19/2020 0.09  <0.25 ng/mL Final    Specimen UA 10/19/2020 Urine, Clean Catch   Final    Color, UA 10/19/2020 Yellow  Yellow, Straw, Carol Final    Appearance, UA 10/19/2020 Clear  Clear Final    pH, UA 10/19/2020 7.0  5.0 - 8.0 Final    Specific [place], UA 10/19/2020 1.015  1.005 - 1.030 Final    Protein, UA 10/19/2020 1+* Negative Final    Glucose, UA 10/19/2020 Negative  Negative Final    Ketones, UA  10/19/2020 Negative  Negative Final    Bilirubin (UA) 10/19/2020 Negative  Negative Final    Occult Blood UA 10/19/2020 Trace* Negative Final    Nitrite, UA 10/19/2020 Negative  Negative Final    Urobilinogen, UA 10/19/2020 Negative  <2.0 EU/dL Final    Leukocytes, UA 10/19/2020 1+* Negative Final    Specimen UA 10/19/2020 Urine, Clean Catch   Final    Color, UA 10/19/2020 Yellow  Yellow, Straw, Carol Final    Appearance, UA 10/19/2020 Clear  Clear Final    pH, UA 10/19/2020 7.0  5.0 - 8.0 Final    Specific [place], UA 10/19/2020 1.015  1.005 - 1.030 Final    Protein, UA 10/19/2020 1+* Negative Final    Glucose, UA 10/19/2020 Negative  Negative Final    Ketones, UA 10/19/2020 Negative  Negative Final    Bilirubin (UA) 10/19/2020 Negative  Negative Final    Occult Blood UA 10/19/2020 Trace* Negative Final    Nitrite, UA 10/19/2020 Negative  Negative Final    Urobilinogen, UA 10/19/2020 Negative  <2.0 EU/dL Final    Leukocytes, UA 10/19/2020 1+* Negative Final    Influenza A, Molecular 10/19/2020 Negative  Negative Final    Influenza B, Molecular 10/19/2020 Negative  Negative Final    Flu A & B Source 10/19/2020 Nasal swab   Final    RBC, UA 10/19/2020 3  0 - 4 /hpf Final    WBC, UA 10/19/2020 20* 0 - 5 /hpf Final    WBC Clumps, UA 10/19/2020 Occasional* None-Rare Final    Bacteria 10/19/2020 Moderate* None-Occ /hpf Final    Squam Epithel, UA 10/19/2020 4  /hpf Final    Hyaline Casts, UA 10/19/2020 0  0-1/lpf /lpf Final    Granular Casts, UA 10/19/2020 1* None /lpf Final    Microscopic Comment 10/19/2020 SEE COMMENT   Final    Urine Culture, Routine 10/19/2020 *  Final                    Value:KLEBSIELLA PNEUMONIAE  >100,000 cfu/ml      POCT Glucose 10/19/2020 213* 70 - 110 mg/dL Final    WBC 10/20/2020 13.99* 3.90 - 12.70 K/uL Final    RBC 10/20/2020 4.30  4.00 - 5.40 M/uL Final    Hemoglobin 10/20/2020 13.4  12.0 - 16.0 g/dL Final    Hematocrit 10/20/2020 39.5  37.0 - 48.5 % Final     MCV 10/20/2020 92  82 - 98 fL Final    MCH 10/20/2020 31.2* 27.0 - 31.0 pg Final    MCHC 10/20/2020 33.9  32.0 - 36.0 g/dL Final    RDW 10/20/2020 14.6* 11.5 - 14.5 % Final    Platelets 10/20/2020 325  150 - 350 K/uL Final    MPV 10/20/2020 9.8  9.2 - 12.9 fL Final    Immature Granulocytes 10/20/2020 0.8* 0.0 - 0.5 % Final    Gran # (ANC) 10/20/2020 12.0* 1.8 - 7.7 K/uL Final    Immature Grans (Abs) 10/20/2020 0.11* 0.00 - 0.04 K/uL Final    Lymph # 10/20/2020 1.6  1.0 - 4.8 K/uL Final    Mono # 10/20/2020 0.3  0.3 - 1.0 K/uL Final    Eos # 10/20/2020 0.0  0.0 - 0.5 K/uL Final    Baso # 10/20/2020 0.01  0.00 - 0.20 K/uL Final    nRBC 10/20/2020 0  0 /100 WBC Final    Gran % 10/20/2020 85.5* 38.0 - 73.0 % Final    Lymph % 10/20/2020 11.7* 18.0 - 48.0 % Final    Mono % 10/20/2020 1.9* 4.0 - 15.0 % Final    Eosinophil % 10/20/2020 0.0  0.0 - 8.0 % Final    Basophil % 10/20/2020 0.1  0.0 - 1.9 % Final    Differential Method 10/20/2020 Automated   Final    Hemoglobin A1C 10/20/2020 5.9* 4.0 - 5.6 % Final    Estimated Avg Glucose 10/20/2020 123  68 - 131 mg/dL Final    POCT Glucose 10/20/2020 189* 70 - 110 mg/dL Final    POCT Glucose 10/20/2020 197* 70 - 110 mg/dL Final    POCT Glucose 10/20/2020 222* 70 - 110 mg/dL Final    POCT Glucose 10/20/2020 192* 70 - 110 mg/dL Final    Troponin I 10/20/2020 0.014  0.000 - 0.026 ng/mL Final    WBC 10/21/2020 16.45* 3.90 - 12.70 K/uL Final    RBC 10/21/2020 4.41  4.00 - 5.40 M/uL Final    Hemoglobin 10/21/2020 13.8  12.0 - 16.0 g/dL Final    Hematocrit 10/21/2020 40.2  37.0 - 48.5 % Final    MCV 10/21/2020 91  82 - 98 fL Final    MCH 10/21/2020 31.3* 27.0 - 31.0 pg Final    MCHC 10/21/2020 34.3  32.0 - 36.0 g/dL Final    RDW 10/21/2020 14.3  11.5 - 14.5 % Final    Platelets 10/21/2020 321  150 - 350 K/uL Final    MPV 10/21/2020 9.6  9.2 - 12.9 fL Final    Immature Granulocytes 10/21/2020 0.9* 0.0 - 0.5 % Final    Gran # (ANC) 10/21/2020  14.6* 1.8 - 7.7 K/uL Final    Immature Grans (Abs) 10/21/2020 0.14* 0.00 - 0.04 K/uL Final    Lymph # 10/21/2020 1.1  1.0 - 4.8 K/uL Final    Mono # 10/21/2020 0.6  0.3 - 1.0 K/uL Final    Eos # 10/21/2020 0.0  0.0 - 0.5 K/uL Final    Baso # 10/21/2020 0.01  0.00 - 0.20 K/uL Final    nRBC 10/21/2020 0  0 /100 WBC Final    Gran % 10/21/2020 88.6* 38.0 - 73.0 % Final    Lymph % 10/21/2020 6.6* 18.0 - 48.0 % Final    Mono % 10/21/2020 3.8* 4.0 - 15.0 % Final    Eosinophil % 10/21/2020 0.0  0.0 - 8.0 % Final    Basophil % 10/21/2020 0.1  0.0 - 1.9 % Final    Differential Method 10/21/2020 Automated   Final    POCT Glucose 10/21/2020 180* 70 - 110 mg/dL Final    POCT Glucose 10/21/2020 172* 70 - 110 mg/dL Final   Lab Visit on 10/05/2020   Component Date Value Ref Range Status    Sodium 10/05/2020 136  136 - 145 mmol/L Final    Potassium 10/05/2020 4.5  3.5 - 5.1 mmol/L Final    Chloride 10/05/2020 95  95 - 110 mmol/L Final    CO2 10/05/2020 35* 23 - 29 mmol/L Final    Glucose 10/05/2020 104  70 - 110 mg/dL Final    BUN 10/05/2020 15  7 - 17 mg/dL Final    Creatinine 10/05/2020 0.67  0.50 - 1.40 mg/dL Final    Calcium 10/05/2020 9.1  8.7 - 10.5 mg/dL Final    Anion Gap 10/05/2020 6* 8 - 16 mmol/L Final    eGFR if African American 10/05/2020 >60.0  >60 mL/min/1.73 m^2 Final    eGFR if non African American 10/05/2020 >60.0  >60 mL/min/1.73 m^2 Final   Hospital Outpatient Visit on 09/23/2020   Component Date Value Ref Range Status    holter length minutes 09/23/2020 59   Final    Event Monitor Day 09/23/2020 0   Final    Holter length hours 09/23/2020 23   Final    holter length dec hours 09/23/2020 23.98   Final   Lab Visit on 08/25/2020   Component Date Value Ref Range Status    WBC 08/25/2020 7.79  3.90 - 12.70 K/uL Final    RBC 08/25/2020 4.35  4.00 - 5.40 M/uL Final    Hemoglobin 08/25/2020 13.4  12.0 - 16.0 g/dL Final    Hematocrit 08/25/2020 41.0  37.0 - 48.5 % Final    MCV 08/25/2020  94  82 - 98 fL Final    MCH 08/25/2020 30.8  27.0 - 31.0 pg Final    MCHC 08/25/2020 32.7  32.0 - 36.0 g/dL Final    RDW 08/25/2020 14.5  11.5 - 14.5 % Final    Platelets 08/25/2020 268  150 - 350 K/uL Final    MPV 08/25/2020 9.5  9.2 - 12.9 fL Final    Immature Granulocytes 08/25/2020 0.4  0.0 - 0.5 % Final    Gran # (ANC) 08/25/2020 4.3  1.8 - 7.7 K/uL Final    Immature Grans (Abs) 08/25/2020 0.03  0.00 - 0.04 K/uL Final    Lymph # 08/25/2020 2.8  1.0 - 4.8 K/uL Final    Mono # 08/25/2020 0.6  0.3 - 1.0 K/uL Final    Eos # 08/25/2020 0.0  0.0 - 0.5 K/uL Final    Baso # 08/25/2020 0.05  0.00 - 0.20 K/uL Final    nRBC 08/25/2020 0  0 /100 WBC Final    Gran % 08/25/2020 54.6  38.0 - 73.0 % Final    Lymph % 08/25/2020 36.3  18.0 - 48.0 % Final    Mono % 08/25/2020 7.8  4.0 - 15.0 % Final    Eosinophil % 08/25/2020 0.3  0.0 - 8.0 % Final    Basophil % 08/25/2020 0.6  0.0 - 1.9 % Final    Differential Method 08/25/2020 Automated   Final    Sodium 08/25/2020 134* 136 - 145 mmol/L Final    Potassium 08/25/2020 4.8  3.5 - 5.1 mmol/L Final    Chloride 08/25/2020 92* 95 - 110 mmol/L Final    CO2 08/25/2020 34* 23 - 29 mmol/L Final    Glucose 08/25/2020 109  70 - 110 mg/dL Final    BUN 08/25/2020 21* 7 - 17 mg/dL Final    Creatinine 08/25/2020 0.78  0.50 - 1.40 mg/dL Final    Calcium 08/25/2020 9.2  8.7 - 10.5 mg/dL Final    Total Protein 08/25/2020 7.9  6.0 - 8.4 g/dL Final    Albumin 08/25/2020 4.5  3.5 - 5.2 g/dL Final    Total Bilirubin 08/25/2020 0.3  0.1 - 1.0 mg/dL Final    Alkaline Phosphatase 08/25/2020 49  38 - 126 U/L Final    AST 08/25/2020 36  15 - 46 U/L Final    ALT 08/25/2020 31  10 - 44 U/L Final    Anion Gap 08/25/2020 8  8 - 16 mmol/L Final    eGFR if African American 08/25/2020 >60.0  >60 mL/min/1.73 m^2 Final    eGFR if non African American 08/25/2020 >60.0  >60 mL/min/1.73 m^2 Final    TSH 08/25/2020 4.180* 0.400 - 4.000 uIU/mL Final    CPK 08/25/2020 55  55 - 170  U/L Final    Cholesterol 08/25/2020 161  120 - 199 mg/dL Final    Triglycerides 08/25/2020 54  30 - 150 mg/dL Final    HDL 08/25/2020 75  40 - 75 mg/dL Final    LDL Cholesterol 08/25/2020 75.2  63.0 - 159.0 mg/dL Final    HDL/Cholesterol Ratio 08/25/2020 46.6  20.0 - 50.0 % Final    Total Cholesterol/HDL Ratio 08/25/2020 2.1  2.0 - 5.0 Final    Non-HDL Cholesterol 08/25/2020 86  mg/dL Final    NT-proBNP 08/25/2020 657  5 - 1800 pg/mL Final    Free T4 08/25/2020 0.96  0.71 - 1.51 ng/dL Final   There may be more visits with results that are not included.   (    Assessment:     1. Chronic atrial fibrillation    2. Essential hypertension    3. Chronic diastolic CHF (congestive heart failure)    4. Pure hypercholesterolemia    5. S/P TAVR (transcatheter aortic valve replacement)    6. Mild persistent chronic asthma without complication    7. Diabetic polyneuropathy associated with type 2 diabetes mellitus    8. Old lacunar stroke without late effect    9. Lumbar spondylosis    10. Gastroesophageal reflux disease, unspecified whether esophagitis present    11. Mesenteric artery insufficiency    12. Costochondritis    13. History of transcatheter aortic valve replacement (TAVR)    14. Coronary artery disease involving native coronary artery of native heart without angina pectoris      Plan:   Adriana was seen today for follow-up.    Diagnoses and all orders for this visit:    Chronic atrial fibrillation    Essential hypertension  -     furosemide (LASIX) 40 MG tablet; Take 1 tablet (40 mg total) by mouth once daily.    Chronic diastolic CHF (congestive heart failure)    Pure hypercholesterolemia    S/P TAVR (transcatheter aortic valve replacement)  -     IN OFFICE EKG 12-LEAD (to [facility])    Mild persistent chronic asthma without complication    Diabetic polyneuropathy associated with type 2 diabetes mellitus    Old lacunar stroke without late effect    Lumbar spondylosis    Gastroesophageal reflux disease,  unspecified whether esophagitis present    Mesenteric artery insufficiency    Costochondritis    History of transcatheter aortic valve replacement (TAVR)  -     IN OFFICE EKG 12-LEAD (to [place] [facility])    Coronary artery disease involving native coronary artery of native heart without angina pectoris    Will resume pantoprazole for GERD     Rest of meds the same    F/u with rheumatology.  Consider out-pt PT    Walk more    Low carb diet    Follow up in about 3 months (around 3/8/2021). with lab

## 2020-12-13 LAB
BRPFT: ABNORMAL
DLCO ADJ PRE: 13.22 ML/(MIN*MMHG) (ref 14.63–26.1)
DLCO SINGLE BREATH LLN: 14.63
DLCO SINGLE BREATH PRE REF: 62.4 %
DLCO SINGLE BREATH REF: 20.37
DLCOC SBVA LLN: 2.68
DLCOC SBVA PRE REF: 100.7 %
DLCOC SBVA REF: 4.1
DLCOC SINGLE BREATH LLN: 14.63
DLCOC SINGLE BREATH PRE REF: 64.9 %
DLCOC SINGLE BREATH REF: 20.37
DLCOVA LLN: 2.68
DLCOVA PRE REF: 96.8 %
DLCOVA PRE: 3.97 ML/(MIN*MMHG*L) (ref 2.68–5.51)
DLCOVA REF: 4.1
DLVAADJ PRE: 4.13 ML/(MIN*MMHG*L) (ref 2.68–5.51)
FEF 25 75 LLN: 0.69
FEF 25 75 PRE REF: 46.8 %
FEF 25 75 REF: 1.62
FEV1 FVC LLN: 63
FEV1 FVC PRE REF: 92.7 %
FEV1 FVC REF: 77
FEV1 LLN: 1.41
FEV1 PRE REF: 61.6 %
FEV1 REF: 2
FRCN2 LLN: 1.91
FRCN2 PRE REF: 55.6 %
FRCN2 REF: 2.73
FVC LLN: 1.85
FVC PRE REF: 65.7 %
FVC REF: 2.62
IVC PRE: 1.75 L (ref 1.85–3.39)
IVC SINGLE BREATH LLN: 1.85
IVC SINGLE BREATH PRE REF: 66.9 %
IVC SINGLE BREATH REF: 2.62
PEF LLN: 3.38
PEF PRE REF: 84.6 %
PEF REF: 5.1
PRE DLCO: 12.71 ML/(MIN*MMHG) (ref 14.63–26.1)
PRE FEF 25 75: 0.76 L/S (ref 0.69–2.55)
PRE FET 100: 8.11 SEC
PRE FEV1 FVC: 71.53 % (ref 62.85–91.55)
PRE FEV1: 1.23 L (ref 1.41–2.59)
PRE FRC N2: 1.52 L
PRE FVC: 1.72 L (ref 1.85–3.39)
PRE PEF: 4.31 L/S (ref 3.38–6.82)
RVN2 LLN: 1.61
RVN2 PRE REF: 71.9 %
RVN2 PRE: 1.57 L (ref 1.61–2.76)
RVN2 REF: 2.18
RVN2TLCN2 LLN: 35.55
RVN2TLCN2 PRE REF: 105.6 %
RVN2TLCN2 PRE: 47.69 % (ref 35.55–54.73)
RVN2TLCN2 REF: 45.14
TLCN2 LLN: 3.98
TLCN2 PRE REF: 66.2 %
TLCN2 PRE: 3.29 L (ref 3.98–5.96)
TLCN2 REF: 4.97
VA PRE: 3.2 L (ref 4.82–4.82)
VA SINGLE BREATH LLN: 4.82
VA SINGLE BREATH PRE REF: 66.5 %
VA SINGLE BREATH REF: 4.82
VCMAXN2 LLN: 1.85
VCMAXN2 PRE REF: 65.7 %
VCMAXN2 PRE: 1.72 L (ref 1.85–3.39)
VCMAXN2 REF: 2.62

## 2021-01-10 ENCOUNTER — IMMUNIZATION (OUTPATIENT)
Dept: FAMILY MEDICINE | Facility: CLINIC | Age: 76
End: 2021-01-10
Payer: MEDICARE

## 2021-01-10 DIAGNOSIS — Z23 NEED FOR VACCINATION: ICD-10-CM

## 2021-01-10 PROCEDURE — 91300 COVID-19, MRNA, LNP-S, PF, 30 MCG/0.3 ML DOSE VACCINE: CPT | Mod: PBBFAC | Performed by: FAMILY MEDICINE

## 2021-01-31 ENCOUNTER — IMMUNIZATION (OUTPATIENT)
Dept: FAMILY MEDICINE | Facility: CLINIC | Age: 76
End: 2021-01-31
Payer: MEDICARE

## 2021-01-31 DIAGNOSIS — Z23 NEED FOR VACCINATION: Primary | ICD-10-CM

## 2021-01-31 PROCEDURE — 91300 COVID-19, MRNA, LNP-S, PF, 30 MCG/0.3 ML DOSE VACCINE: CPT | Mod: PBBFAC | Performed by: FAMILY MEDICINE

## 2021-01-31 PROCEDURE — 0002A COVID-19, MRNA, LNP-S, PF, 30 MCG/0.3 ML DOSE VACCINE: CPT | Mod: PBBFAC | Performed by: FAMILY MEDICINE

## 2021-02-01 RX ORDER — GABAPENTIN 300 MG/1
600 CAPSULE ORAL 3 TIMES DAILY
Qty: 540 CAPSULE | Refills: 0 | OUTPATIENT
Start: 2021-02-01

## 2021-02-02 ENCOUNTER — TELEPHONE (OUTPATIENT)
Dept: NEUROLOGY | Facility: CLINIC | Age: 76
End: 2021-02-02

## 2021-02-22 ENCOUNTER — OFFICE VISIT (OUTPATIENT)
Dept: PULMONOLOGY | Facility: CLINIC | Age: 76
End: 2021-02-22
Payer: MEDICARE

## 2021-02-22 VITALS
BODY MASS INDEX: 40.97 KG/M2 | HEART RATE: 52 BPM | HEIGHT: 64 IN | OXYGEN SATURATION: 96 % | RESPIRATION RATE: 18 BRPM | WEIGHT: 240 LBS | DIASTOLIC BLOOD PRESSURE: 89 MMHG | SYSTOLIC BLOOD PRESSURE: 148 MMHG

## 2021-02-22 DIAGNOSIS — E66.01 CLASS 3 SEVERE OBESITY WITH BODY MASS INDEX (BMI) OF 40.0 TO 44.9 IN ADULT, UNSPECIFIED OBESITY TYPE, UNSPECIFIED WHETHER SERIOUS COMORBIDITY PRESENT: ICD-10-CM

## 2021-02-22 DIAGNOSIS — J45.30 MILD PERSISTENT ASTHMA, UNSPECIFIED WHETHER COMPLICATED: Primary | ICD-10-CM

## 2021-02-22 PROCEDURE — 3077F SYST BP >= 140 MM HG: CPT | Mod: CPTII,S$GLB,, | Performed by: INTERNAL MEDICINE

## 2021-02-22 PROCEDURE — 3077F PR MOST RECENT SYSTOLIC BLOOD PRESSURE >= 140 MM HG: ICD-10-PCS | Mod: CPTII,S$GLB,, | Performed by: INTERNAL MEDICINE

## 2021-02-22 PROCEDURE — 99999 PR PBB SHADOW E&M-EST. PATIENT-LVL V: ICD-10-PCS | Mod: PBBFAC,,, | Performed by: INTERNAL MEDICINE

## 2021-02-22 PROCEDURE — 3288F PR FALLS RISK ASSESSMENT DOCUMENTED: ICD-10-PCS | Mod: CPTII,S$GLB,, | Performed by: INTERNAL MEDICINE

## 2021-02-22 PROCEDURE — 1159F PR MEDICATION LIST DOCUMENTED IN MEDICAL RECORD: ICD-10-PCS | Mod: S$GLB,,, | Performed by: INTERNAL MEDICINE

## 2021-02-22 PROCEDURE — 1126F PR PAIN SEVERITY QUANTIFIED, NO PAIN PRESENT: ICD-10-PCS | Mod: S$GLB,,, | Performed by: INTERNAL MEDICINE

## 2021-02-22 PROCEDURE — 99999 PR PBB SHADOW E&M-EST. PATIENT-LVL V: CPT | Mod: PBBFAC,,, | Performed by: INTERNAL MEDICINE

## 2021-02-22 PROCEDURE — 1159F MED LIST DOCD IN RCRD: CPT | Mod: S$GLB,,, | Performed by: INTERNAL MEDICINE

## 2021-02-22 PROCEDURE — 1157F ADVNC CARE PLAN IN RCRD: CPT | Mod: S$GLB,,, | Performed by: INTERNAL MEDICINE

## 2021-02-22 PROCEDURE — 99214 PR OFFICE/OUTPT VISIT, EST, LEVL IV, 30-39 MIN: ICD-10-PCS | Mod: S$GLB,,, | Performed by: INTERNAL MEDICINE

## 2021-02-22 PROCEDURE — 3079F PR MOST RECENT DIASTOLIC BLOOD PRESSURE 80-89 MM HG: ICD-10-PCS | Mod: CPTII,S$GLB,, | Performed by: INTERNAL MEDICINE

## 2021-02-22 PROCEDURE — 99214 OFFICE O/P EST MOD 30 MIN: CPT | Mod: S$GLB,,, | Performed by: INTERNAL MEDICINE

## 2021-02-22 PROCEDURE — 1157F PR ADVANCE CARE PLAN OR EQUIV PRESENT IN MEDICAL RECORD: ICD-10-PCS | Mod: S$GLB,,, | Performed by: INTERNAL MEDICINE

## 2021-02-22 PROCEDURE — 1101F PR PT FALLS ASSESS DOC 0-1 FALLS W/OUT INJ PAST YR: ICD-10-PCS | Mod: CPTII,S$GLB,, | Performed by: INTERNAL MEDICINE

## 2021-02-22 PROCEDURE — 3288F FALL RISK ASSESSMENT DOCD: CPT | Mod: CPTII,S$GLB,, | Performed by: INTERNAL MEDICINE

## 2021-02-22 PROCEDURE — 1126F AMNT PAIN NOTED NONE PRSNT: CPT | Mod: S$GLB,,, | Performed by: INTERNAL MEDICINE

## 2021-02-22 PROCEDURE — 1101F PT FALLS ASSESS-DOCD LE1/YR: CPT | Mod: CPTII,S$GLB,, | Performed by: INTERNAL MEDICINE

## 2021-02-22 PROCEDURE — 3079F DIAST BP 80-89 MM HG: CPT | Mod: CPTII,S$GLB,, | Performed by: INTERNAL MEDICINE

## 2021-02-22 RX ORDER — PREDNISONE 10 MG/1
10 TABLET ORAL DAILY
Qty: 9 TABLET | Refills: 1 | Status: SHIPPED | OUTPATIENT
Start: 2021-02-22 | End: 2021-03-03

## 2021-02-22 RX ORDER — FLUTICASONE PROPIONATE AND SALMETEROL XINAFOATE 115; 21 UG/1; UG/1
2 AEROSOL, METERED RESPIRATORY (INHALATION) EVERY 12 HOURS
Qty: 1 INHALER | Refills: 6 | Status: SHIPPED | OUTPATIENT
Start: 2021-02-22 | End: 2021-12-08

## 2021-03-01 ENCOUNTER — OFFICE VISIT (OUTPATIENT)
Dept: ELECTROPHYSIOLOGY | Facility: CLINIC | Age: 76
End: 2021-03-01
Payer: MEDICARE

## 2021-03-01 ENCOUNTER — HOSPITAL ENCOUNTER (OUTPATIENT)
Dept: CARDIOLOGY | Facility: CLINIC | Age: 76
Discharge: HOME | End: 2021-03-01
Payer: MEDICARE

## 2021-03-01 VITALS
SYSTOLIC BLOOD PRESSURE: 121 MMHG | BODY MASS INDEX: 40.95 KG/M2 | WEIGHT: 239.88 LBS | HEIGHT: 64 IN | DIASTOLIC BLOOD PRESSURE: 56 MMHG | HEART RATE: 94 BPM

## 2021-03-01 DIAGNOSIS — I10 ESSENTIAL HYPERTENSION: ICD-10-CM

## 2021-03-01 DIAGNOSIS — I48.20 CHRONIC ATRIAL FIBRILLATION: ICD-10-CM

## 2021-03-01 DIAGNOSIS — R55 VASOVAGAL SYNCOPE: ICD-10-CM

## 2021-03-01 DIAGNOSIS — I48.92 ATRIAL FLUTTER, UNSPECIFIED TYPE: Primary | ICD-10-CM

## 2021-03-01 PROCEDURE — 1157F ADVNC CARE PLAN IN RCRD: CPT | Mod: S$GLB,,, | Performed by: NURSE PRACTITIONER

## 2021-03-01 PROCEDURE — 1159F MED LIST DOCD IN RCRD: CPT | Mod: S$GLB,,, | Performed by: NURSE PRACTITIONER

## 2021-03-01 PROCEDURE — 1126F PR PAIN SEVERITY QUANTIFIED, NO PAIN PRESENT: ICD-10-PCS | Mod: S$GLB,,, | Performed by: NURSE PRACTITIONER

## 2021-03-01 PROCEDURE — 99999 PR PBB SHADOW E&M-EST. PATIENT-LVL V: CPT | Mod: PBBFAC,,, | Performed by: NURSE PRACTITIONER

## 2021-03-01 PROCEDURE — 93010 ELECTROCARDIOGRAM REPORT: CPT | Mod: S$GLB,,, | Performed by: INTERNAL MEDICINE

## 2021-03-01 PROCEDURE — 1159F PR MEDICATION LIST DOCUMENTED IN MEDICAL RECORD: ICD-10-PCS | Mod: S$GLB,,, | Performed by: NURSE PRACTITIONER

## 2021-03-01 PROCEDURE — 93010 RHYTHM STRIP: ICD-10-PCS | Mod: S$GLB,,, | Performed by: INTERNAL MEDICINE

## 2021-03-01 PROCEDURE — 3074F SYST BP LT 130 MM HG: CPT | Mod: CPTII,S$GLB,, | Performed by: NURSE PRACTITIONER

## 2021-03-01 PROCEDURE — 3078F PR MOST RECENT DIASTOLIC BLOOD PRESSURE < 80 MM HG: ICD-10-PCS | Mod: CPTII,S$GLB,, | Performed by: NURSE PRACTITIONER

## 2021-03-01 PROCEDURE — 1101F PR PT FALLS ASSESS DOC 0-1 FALLS W/OUT INJ PAST YR: ICD-10-PCS | Mod: CPTII,S$GLB,, | Performed by: NURSE PRACTITIONER

## 2021-03-01 PROCEDURE — 3288F FALL RISK ASSESSMENT DOCD: CPT | Mod: CPTII,S$GLB,, | Performed by: NURSE PRACTITIONER

## 2021-03-01 PROCEDURE — 99999 PR PBB SHADOW E&M-EST. PATIENT-LVL V: ICD-10-PCS | Mod: PBBFAC,,, | Performed by: NURSE PRACTITIONER

## 2021-03-01 PROCEDURE — 1126F AMNT PAIN NOTED NONE PRSNT: CPT | Mod: S$GLB,,, | Performed by: NURSE PRACTITIONER

## 2021-03-01 PROCEDURE — 93005 RHYTHM STRIP: ICD-10-PCS | Mod: S$GLB,,, | Performed by: INTERNAL MEDICINE

## 2021-03-01 PROCEDURE — 99214 OFFICE O/P EST MOD 30 MIN: CPT | Mod: S$GLB,,, | Performed by: NURSE PRACTITIONER

## 2021-03-01 PROCEDURE — 99214 PR OFFICE/OUTPT VISIT, EST, LEVL IV, 30-39 MIN: ICD-10-PCS | Mod: S$GLB,,, | Performed by: NURSE PRACTITIONER

## 2021-03-01 PROCEDURE — 3074F PR MOST RECENT SYSTOLIC BLOOD PRESSURE < 130 MM HG: ICD-10-PCS | Mod: CPTII,S$GLB,, | Performed by: NURSE PRACTITIONER

## 2021-03-01 PROCEDURE — 1101F PT FALLS ASSESS-DOCD LE1/YR: CPT | Mod: CPTII,S$GLB,, | Performed by: NURSE PRACTITIONER

## 2021-03-01 PROCEDURE — 93005 ELECTROCARDIOGRAM TRACING: CPT | Mod: S$GLB,,, | Performed by: INTERNAL MEDICINE

## 2021-03-01 PROCEDURE — 1157F PR ADVANCE CARE PLAN OR EQUIV PRESENT IN MEDICAL RECORD: ICD-10-PCS | Mod: S$GLB,,, | Performed by: NURSE PRACTITIONER

## 2021-03-01 PROCEDURE — 3288F PR FALLS RISK ASSESSMENT DOCUMENTED: ICD-10-PCS | Mod: CPTII,S$GLB,, | Performed by: NURSE PRACTITIONER

## 2021-03-01 PROCEDURE — 3078F DIAST BP <80 MM HG: CPT | Mod: CPTII,S$GLB,, | Performed by: NURSE PRACTITIONER

## 2021-03-01 RX ORDER — FLUTICASONE PROPIONATE AND SALMETEROL XINAFOATE 115; 21 UG/1; UG/1
2 AEROSOL, METERED RESPIRATORY (INHALATION) EVERY 12 HOURS
COMMUNITY
End: 2021-03-23 | Stop reason: SDUPTHER

## 2021-03-05 ENCOUNTER — HOSPITAL ENCOUNTER (OUTPATIENT)
Dept: CARDIOLOGY | Facility: HOSPITAL | Age: 76
Discharge: HOME | End: 2021-03-05
Attending: NURSE PRACTITIONER
Payer: MEDICARE

## 2021-03-05 DIAGNOSIS — I48.20 CHRONIC ATRIAL FIBRILLATION: ICD-10-CM

## 2021-03-05 PROCEDURE — 93225 XTRNL ECG REC<48 HRS REC: CPT | Mod: PO

## 2021-03-05 PROCEDURE — 93227 XTRNL ECG REC<48 HR R&I: CPT | Mod: ,,, | Performed by: INTERNAL MEDICINE

## 2021-03-05 PROCEDURE — 93227 HOLTER MONITOR - 24 HOUR (CUPID ONLY): ICD-10-PCS | Mod: ,,, | Performed by: INTERNAL MEDICINE

## 2021-03-11 ENCOUNTER — TELEPHONE (OUTPATIENT)
Dept: ELECTROPHYSIOLOGY | Facility: CLINIC | Age: 76
End: 2021-03-11

## 2021-03-11 DIAGNOSIS — I48.20 CHRONIC ATRIAL FIBRILLATION: Primary | ICD-10-CM

## 2021-03-11 RX ORDER — DILTIAZEM HYDROCHLORIDE 420 MG/1
420 CAPSULE, EXTENDED RELEASE ORAL DAILY
Qty: 30 CAPSULE | Refills: 5 | Status: SHIPPED | OUTPATIENT
Start: 2021-03-11 | End: 2021-06-22

## 2021-03-16 ENCOUNTER — LAB VISIT (OUTPATIENT)
Dept: LAB | Facility: HOSPITAL | Age: 76
End: 2021-03-16
Attending: INTERNAL MEDICINE
Payer: MEDICARE

## 2021-03-16 DIAGNOSIS — I50.32 CHRONIC DIASTOLIC CHF (CONGESTIVE HEART FAILURE): ICD-10-CM

## 2021-03-16 DIAGNOSIS — I10 ESSENTIAL HYPERTENSION: ICD-10-CM

## 2021-03-16 DIAGNOSIS — I48.20 CHRONIC ATRIAL FIBRILLATION: ICD-10-CM

## 2021-03-16 DIAGNOSIS — E78.00 PURE HYPERCHOLESTEROLEMIA: ICD-10-CM

## 2021-03-16 LAB
ALBUMIN SERPL BCP-MCNC: 4.4 G/DL (ref 3.5–5.2)
ALP SERPL-CCNC: 47 U/L (ref 38–126)
ALT SERPL W/O P-5'-P-CCNC: 26 U/L (ref 10–44)
ANION GAP SERPL CALC-SCNC: 8 MMOL/L (ref 8–16)
AST SERPL-CCNC: 29 U/L (ref 15–46)
BASOPHILS # BLD AUTO: 0.05 K/UL (ref 0–0.2)
BASOPHILS NFR BLD: 0.7 % (ref 0–1.9)
BILIRUB SERPL-MCNC: 0.3 MG/DL (ref 0.1–1)
CALCIUM SERPL-MCNC: 9.6 MG/DL (ref 8.7–10.5)
CHLORIDE SERPL-SCNC: 99 MMOL/L (ref 95–110)
CHOLEST SERPL-MCNC: 143 MG/DL (ref 120–199)
CHOLEST/HDLC SERPL: 2.6 {RATIO} (ref 2–5)
CO2 SERPL-SCNC: 32 MMOL/L (ref 23–29)
CREAT SERPL-MCNC: 0.71 MG/DL (ref 0.5–1.4)
DIFFERENTIAL METHOD: NORMAL
EOSINOPHIL # BLD AUTO: 0 K/UL (ref 0–0.5)
EOSINOPHIL NFR BLD: 0.3 % (ref 0–8)
ERYTHROCYTE [DISTWIDTH] IN BLOOD BY AUTOMATED COUNT: 14.1 % (ref 11.5–14.5)
EST. GFR  (AFRICAN AMERICAN): >60 ML/MIN/1.73 M^2
EST. GFR  (NON AFRICAN AMERICAN): >60 ML/MIN/1.73 M^2
GLUCOSE SERPL-MCNC: 112 MG/DL (ref 70–110)
HCT VFR BLD AUTO: 38.1 % (ref 37–48.5)
HDLC SERPL-MCNC: 56 MG/DL (ref 40–75)
HDLC SERPL: 39.2 % (ref 20–50)
HGB BLD-MCNC: 12.5 G/DL (ref 12–16)
IMM GRANULOCYTES # BLD AUTO: 0.03 K/UL (ref 0–0.04)
IMM GRANULOCYTES NFR BLD AUTO: 0.4 % (ref 0–0.5)
LDLC SERPL CALC-MCNC: 69.4 MG/DL (ref 63–159)
LYMPHOCYTES # BLD AUTO: 2.4 K/UL (ref 1–4.8)
LYMPHOCYTES NFR BLD: 34.3 % (ref 18–48)
MCH RBC QN AUTO: 29.9 PG (ref 27–31)
MCHC RBC AUTO-ENTMCNC: 32.8 G/DL (ref 32–36)
MCV RBC AUTO: 91 FL (ref 82–98)
MONOCYTES # BLD AUTO: 0.7 K/UL (ref 0.3–1)
MONOCYTES NFR BLD: 10.4 % (ref 4–15)
NEUTROPHILS # BLD AUTO: 3.8 K/UL (ref 1.8–7.7)
NEUTROPHILS NFR BLD: 53.9 % (ref 38–73)
NONHDLC SERPL-MCNC: 87 MG/DL
NRBC BLD-RTO: 0 /100 WBC
PLATELET # BLD AUTO: 242 K/UL (ref 150–350)
PMV BLD AUTO: 10.1 FL (ref 9.2–12.9)
POTASSIUM SERPL-SCNC: 4.5 MMOL/L (ref 3.5–5.1)
PROT SERPL-MCNC: 7.4 G/DL (ref 6–8.4)
RBC # BLD AUTO: 4.18 M/UL (ref 4–5.4)
SODIUM SERPL-SCNC: 139 MMOL/L (ref 136–145)
TRIGL SERPL-MCNC: 88 MG/DL (ref 30–150)
TSH SERPL DL<=0.005 MIU/L-ACNC: 3.93 UIU/ML (ref 0.4–4)
UUN UR-MCNC: 17 MG/DL (ref 7–17)
WBC # BLD AUTO: 7.02 K/UL (ref 3.9–12.7)

## 2021-03-16 PROCEDURE — 80053 COMPREHEN METABOLIC PANEL: CPT | Mod: PO | Performed by: INTERNAL MEDICINE

## 2021-03-16 PROCEDURE — 85025 COMPLETE CBC W/AUTO DIFF WBC: CPT | Mod: PO | Performed by: INTERNAL MEDICINE

## 2021-03-16 PROCEDURE — 36415 COLL VENOUS BLD VENIPUNCTURE: CPT | Mod: PO | Performed by: INTERNAL MEDICINE

## 2021-03-16 PROCEDURE — 80061 LIPID PANEL: CPT | Performed by: INTERNAL MEDICINE

## 2021-03-16 PROCEDURE — 84443 ASSAY THYROID STIM HORMONE: CPT | Mod: PO | Performed by: INTERNAL MEDICINE

## 2021-03-23 ENCOUNTER — OFFICE VISIT (OUTPATIENT)
Dept: CARDIOLOGY | Facility: CLINIC | Age: 76
End: 2021-03-23
Payer: MEDICARE

## 2021-03-23 VITALS
WEIGHT: 244.63 LBS | BODY MASS INDEX: 41.76 KG/M2 | HEIGHT: 64 IN | DIASTOLIC BLOOD PRESSURE: 60 MMHG | HEART RATE: 55 BPM | SYSTOLIC BLOOD PRESSURE: 110 MMHG | OXYGEN SATURATION: 96 %

## 2021-03-23 DIAGNOSIS — J42 CHRONIC BRONCHITIS, UNSPECIFIED CHRONIC BRONCHITIS TYPE: ICD-10-CM

## 2021-03-23 DIAGNOSIS — I25.10 CORONARY ARTERY DISEASE INVOLVING NATIVE CORONARY ARTERY OF NATIVE HEART WITHOUT ANGINA PECTORIS: ICD-10-CM

## 2021-03-23 DIAGNOSIS — I10 ESSENTIAL HYPERTENSION: ICD-10-CM

## 2021-03-23 DIAGNOSIS — R60.0 LOCALIZED EDEMA: ICD-10-CM

## 2021-03-23 DIAGNOSIS — Z95.2 S/P TAVR (TRANSCATHETER AORTIC VALVE REPLACEMENT): ICD-10-CM

## 2021-03-23 DIAGNOSIS — I48.92 ATRIAL FLUTTER, UNSPECIFIED TYPE: ICD-10-CM

## 2021-03-23 DIAGNOSIS — I50.32 CHRONIC DIASTOLIC CHF (CONGESTIVE HEART FAILURE): ICD-10-CM

## 2021-03-23 DIAGNOSIS — Z95.2 HISTORY OF TRANSCATHETER AORTIC VALVE REPLACEMENT (TAVR): ICD-10-CM

## 2021-03-23 DIAGNOSIS — E78.00 PURE HYPERCHOLESTEROLEMIA: ICD-10-CM

## 2021-03-23 DIAGNOSIS — K55.1 CHRONIC MESENTERIC ISCHEMIA: ICD-10-CM

## 2021-03-23 DIAGNOSIS — J45.20 MILD INTERMITTENT ASTHMA IN ADULT WITHOUT COMPLICATION: Primary | ICD-10-CM

## 2021-03-23 PROCEDURE — 3074F SYST BP LT 130 MM HG: CPT | Mod: CPTII,S$GLB,, | Performed by: INTERNAL MEDICINE

## 2021-03-23 PROCEDURE — 93000 EKG 12-LEAD: ICD-10-PCS | Mod: S$GLB,,, | Performed by: INTERNAL MEDICINE

## 2021-03-23 PROCEDURE — 99214 OFFICE O/P EST MOD 30 MIN: CPT | Mod: 25,S$GLB,, | Performed by: INTERNAL MEDICINE

## 2021-03-23 PROCEDURE — 3074F PR MOST RECENT SYSTOLIC BLOOD PRESSURE < 130 MM HG: ICD-10-PCS | Mod: CPTII,S$GLB,, | Performed by: INTERNAL MEDICINE

## 2021-03-23 PROCEDURE — 1157F PR ADVANCE CARE PLAN OR EQUIV PRESENT IN MEDICAL RECORD: ICD-10-PCS | Mod: S$GLB,,, | Performed by: INTERNAL MEDICINE

## 2021-03-23 PROCEDURE — 1159F MED LIST DOCD IN RCRD: CPT | Mod: S$GLB,,, | Performed by: INTERNAL MEDICINE

## 2021-03-23 PROCEDURE — 1101F PT FALLS ASSESS-DOCD LE1/YR: CPT | Mod: CPTII,S$GLB,, | Performed by: INTERNAL MEDICINE

## 2021-03-23 PROCEDURE — 1126F AMNT PAIN NOTED NONE PRSNT: CPT | Mod: S$GLB,,, | Performed by: INTERNAL MEDICINE

## 2021-03-23 PROCEDURE — 3288F FALL RISK ASSESSMENT DOCD: CPT | Mod: CPTII,S$GLB,, | Performed by: INTERNAL MEDICINE

## 2021-03-23 PROCEDURE — 3078F DIAST BP <80 MM HG: CPT | Mod: CPTII,S$GLB,, | Performed by: INTERNAL MEDICINE

## 2021-03-23 PROCEDURE — 1126F PR PAIN SEVERITY QUANTIFIED, NO PAIN PRESENT: ICD-10-PCS | Mod: S$GLB,,, | Performed by: INTERNAL MEDICINE

## 2021-03-23 PROCEDURE — 99999 PR PBB SHADOW E&M-EST. PATIENT-LVL V: ICD-10-PCS | Mod: PBBFAC,,, | Performed by: INTERNAL MEDICINE

## 2021-03-23 PROCEDURE — 1159F PR MEDICATION LIST DOCUMENTED IN MEDICAL RECORD: ICD-10-PCS | Mod: S$GLB,,, | Performed by: INTERNAL MEDICINE

## 2021-03-23 PROCEDURE — 1157F ADVNC CARE PLAN IN RCRD: CPT | Mod: S$GLB,,, | Performed by: INTERNAL MEDICINE

## 2021-03-23 PROCEDURE — 93000 ELECTROCARDIOGRAM COMPLETE: CPT | Mod: S$GLB,,, | Performed by: INTERNAL MEDICINE

## 2021-03-23 PROCEDURE — 3288F PR FALLS RISK ASSESSMENT DOCUMENTED: ICD-10-PCS | Mod: CPTII,S$GLB,, | Performed by: INTERNAL MEDICINE

## 2021-03-23 PROCEDURE — 3078F PR MOST RECENT DIASTOLIC BLOOD PRESSURE < 80 MM HG: ICD-10-PCS | Mod: CPTII,S$GLB,, | Performed by: INTERNAL MEDICINE

## 2021-03-23 PROCEDURE — 1101F PR PT FALLS ASSESS DOC 0-1 FALLS W/OUT INJ PAST YR: ICD-10-PCS | Mod: CPTII,S$GLB,, | Performed by: INTERNAL MEDICINE

## 2021-03-23 PROCEDURE — 99999 PR PBB SHADOW E&M-EST. PATIENT-LVL V: CPT | Mod: PBBFAC,,, | Performed by: INTERNAL MEDICINE

## 2021-03-23 PROCEDURE — 99214 PR OFFICE/OUTPT VISIT, EST, LEVL IV, 30-39 MIN: ICD-10-PCS | Mod: 25,S$GLB,, | Performed by: INTERNAL MEDICINE

## 2021-03-23 RX ORDER — MONTELUKAST SODIUM 10 MG/1
10 TABLET ORAL DAILY
COMMUNITY
Start: 2021-03-04

## 2021-03-23 RX ORDER — ALUMINUM HYDROXIDE, MAGNESIUM HYDROXIDE, AND SIMETHICONE 2400; 240; 2400 MG/30ML; MG/30ML; MG/30ML
SUSPENSION ORAL EVERY 6 HOURS PRN
COMMUNITY
End: 2022-08-11

## 2021-03-23 RX ORDER — ATORVASTATIN CALCIUM 10 MG/1
10 TABLET, FILM COATED ORAL DAILY
Qty: 90 TABLET | Refills: 3 | Status: SHIPPED | OUTPATIENT
Start: 2021-03-23 | End: 2022-08-11 | Stop reason: SDUPTHER

## 2021-05-20 ENCOUNTER — TELEPHONE (OUTPATIENT)
Dept: CARDIOLOGY | Facility: CLINIC | Age: 76
End: 2021-05-20

## 2021-05-24 ENCOUNTER — OFFICE VISIT (OUTPATIENT)
Dept: PULMONOLOGY | Facility: CLINIC | Age: 76
End: 2021-05-24
Payer: MEDICARE

## 2021-05-24 VITALS
RESPIRATION RATE: 18 BRPM | BODY MASS INDEX: 41.92 KG/M2 | DIASTOLIC BLOOD PRESSURE: 67 MMHG | HEIGHT: 64 IN | SYSTOLIC BLOOD PRESSURE: 125 MMHG | HEART RATE: 75 BPM | OXYGEN SATURATION: 92 % | WEIGHT: 245.56 LBS

## 2021-05-24 DIAGNOSIS — J45.20 MILD INTERMITTENT ASTHMA IN ADULT WITHOUT COMPLICATION: ICD-10-CM

## 2021-05-24 DIAGNOSIS — J45.901 MILD ASTHMA WITH EXACERBATION, UNSPECIFIED WHETHER PERSISTENT: ICD-10-CM

## 2021-05-24 DIAGNOSIS — J45.30 MILD PERSISTENT ASTHMA, UNSPECIFIED WHETHER COMPLICATED: Primary | ICD-10-CM

## 2021-05-24 DIAGNOSIS — E66.01 CLASS 3 SEVERE OBESITY WITH BODY MASS INDEX (BMI) OF 40.0 TO 44.9 IN ADULT, UNSPECIFIED OBESITY TYPE, UNSPECIFIED WHETHER SERIOUS COMORBIDITY PRESENT: ICD-10-CM

## 2021-05-24 PROCEDURE — 1157F ADVNC CARE PLAN IN RCRD: CPT | Mod: S$GLB,,, | Performed by: INTERNAL MEDICINE

## 2021-05-24 PROCEDURE — 1157F PR ADVANCE CARE PLAN OR EQUIV PRESENT IN MEDICAL RECORD: ICD-10-PCS | Mod: S$GLB,,, | Performed by: INTERNAL MEDICINE

## 2021-05-24 PROCEDURE — 3288F PR FALLS RISK ASSESSMENT DOCUMENTED: ICD-10-PCS | Mod: CPTII,S$GLB,, | Performed by: INTERNAL MEDICINE

## 2021-05-24 PROCEDURE — 99213 PR OFFICE/OUTPT VISIT, EST, LEVL III, 20-29 MIN: ICD-10-PCS | Mod: S$GLB,,, | Performed by: INTERNAL MEDICINE

## 2021-05-24 PROCEDURE — 99213 OFFICE O/P EST LOW 20 MIN: CPT | Mod: S$GLB,,, | Performed by: INTERNAL MEDICINE

## 2021-05-24 PROCEDURE — 99999 PR PBB SHADOW E&M-EST. PATIENT-LVL V: ICD-10-PCS | Mod: PBBFAC,,, | Performed by: INTERNAL MEDICINE

## 2021-05-24 PROCEDURE — 1101F PT FALLS ASSESS-DOCD LE1/YR: CPT | Mod: CPTII,S$GLB,, | Performed by: INTERNAL MEDICINE

## 2021-05-24 PROCEDURE — 1126F PR PAIN SEVERITY QUANTIFIED, NO PAIN PRESENT: ICD-10-PCS | Mod: S$GLB,,, | Performed by: INTERNAL MEDICINE

## 2021-05-24 PROCEDURE — 3288F FALL RISK ASSESSMENT DOCD: CPT | Mod: CPTII,S$GLB,, | Performed by: INTERNAL MEDICINE

## 2021-05-24 PROCEDURE — 99999 PR PBB SHADOW E&M-EST. PATIENT-LVL V: CPT | Mod: PBBFAC,,, | Performed by: INTERNAL MEDICINE

## 2021-05-24 PROCEDURE — 1159F MED LIST DOCD IN RCRD: CPT | Mod: S$GLB,,, | Performed by: INTERNAL MEDICINE

## 2021-05-24 PROCEDURE — 1101F PR PT FALLS ASSESS DOC 0-1 FALLS W/OUT INJ PAST YR: ICD-10-PCS | Mod: CPTII,S$GLB,, | Performed by: INTERNAL MEDICINE

## 2021-05-24 PROCEDURE — 1126F AMNT PAIN NOTED NONE PRSNT: CPT | Mod: S$GLB,,, | Performed by: INTERNAL MEDICINE

## 2021-05-24 PROCEDURE — 1159F PR MEDICATION LIST DOCUMENTED IN MEDICAL RECORD: ICD-10-PCS | Mod: S$GLB,,, | Performed by: INTERNAL MEDICINE

## 2021-05-24 RX ORDER — PREDNISONE 20 MG/1
20 TABLET ORAL DAILY
Qty: 5 TABLET | Refills: 0 | Status: SHIPPED | OUTPATIENT
Start: 2021-05-24 | End: 2021-12-08

## 2021-05-24 RX ORDER — FLUCONAZOLE 150 MG/1
150 TABLET ORAL ONCE
COMMUNITY
Start: 2021-05-20 | End: 2021-12-08

## 2021-05-24 RX ORDER — AZELASTINE 1 MG/ML
1 SPRAY, METERED NASAL 2 TIMES DAILY
Qty: 30 ML | Refills: 3 | Status: SHIPPED | OUTPATIENT
Start: 2021-05-24 | End: 2022-08-11

## 2021-05-24 RX ORDER — AMOXICILLIN 500 MG/1
500 CAPSULE ORAL 3 TIMES DAILY
COMMUNITY
Start: 2021-05-19 | End: 2021-12-08

## 2021-05-24 RX ORDER — METHOCARBAMOL 500 MG/1
TABLET, FILM COATED ORAL
COMMUNITY
Start: 2021-05-17 | End: 2021-12-08

## 2021-05-28 ENCOUNTER — PATIENT MESSAGE (OUTPATIENT)
Dept: CARDIOLOGY | Facility: CLINIC | Age: 76
End: 2021-05-28

## 2021-05-28 ENCOUNTER — PATIENT MESSAGE (OUTPATIENT)
Dept: PULMONOLOGY | Facility: CLINIC | Age: 76
End: 2021-05-28

## 2021-05-28 ENCOUNTER — PATIENT MESSAGE (OUTPATIENT)
Dept: VASCULAR SURGERY | Facility: CLINIC | Age: 76
End: 2021-05-28

## 2021-05-31 ENCOUNTER — TELEPHONE (OUTPATIENT)
Dept: CARDIOLOGY | Facility: CLINIC | Age: 76
End: 2021-05-31

## 2021-05-31 ENCOUNTER — TELEPHONE (OUTPATIENT)
Dept: PULMONOLOGY | Facility: CLINIC | Age: 76
End: 2021-05-31

## 2021-06-01 ENCOUNTER — TELEPHONE (OUTPATIENT)
Dept: CARDIOLOGY | Facility: CLINIC | Age: 76
End: 2021-06-01

## 2021-06-01 ENCOUNTER — PATIENT MESSAGE (OUTPATIENT)
Dept: CARDIOLOGY | Facility: CLINIC | Age: 76
End: 2021-06-01

## 2021-06-02 ENCOUNTER — TELEPHONE (OUTPATIENT)
Dept: NEUROLOGY | Facility: CLINIC | Age: 76
End: 2021-06-02

## 2021-06-02 ENCOUNTER — PATIENT MESSAGE (OUTPATIENT)
Dept: PULMONOLOGY | Facility: CLINIC | Age: 76
End: 2021-06-02

## 2021-06-09 ENCOUNTER — HOSPITAL ENCOUNTER (OUTPATIENT)
Dept: CARDIOLOGY | Facility: HOSPITAL | Age: 76
Discharge: HOME | End: 2021-06-09
Attending: INTERNAL MEDICINE
Payer: MEDICARE

## 2021-06-09 ENCOUNTER — HOSPITAL ENCOUNTER (OUTPATIENT)
Dept: RADIOLOGY | Facility: HOSPITAL | Age: 76
Discharge: HOME | End: 2021-06-09
Attending: INTERNAL MEDICINE
Payer: MEDICARE

## 2021-06-09 ENCOUNTER — TELEPHONE (OUTPATIENT)
Dept: CARDIOLOGY | Facility: CLINIC | Age: 76
End: 2021-06-09

## 2021-06-09 DIAGNOSIS — I50.32 CHRONIC DIASTOLIC CHF (CONGESTIVE HEART FAILURE): ICD-10-CM

## 2021-06-09 DIAGNOSIS — I48.92 ATRIAL FLUTTER, UNSPECIFIED TYPE: ICD-10-CM

## 2021-06-09 PROCEDURE — 93227 XTRNL ECG REC<48 HR R&I: CPT | Mod: ,,, | Performed by: INTERNAL MEDICINE

## 2021-06-09 PROCEDURE — 93227 HOLTER MONITOR - 24 HOUR (CUPID ONLY): ICD-10-PCS | Mod: ,,, | Performed by: INTERNAL MEDICINE

## 2021-06-09 PROCEDURE — 93225 XTRNL ECG REC<48 HRS REC: CPT | Mod: PO

## 2021-06-09 PROCEDURE — 71046 X-RAY EXAM CHEST 2 VIEWS: CPT | Mod: TC,FY,PO

## 2021-06-22 ENCOUNTER — NURSE TRIAGE (OUTPATIENT)
Dept: ADMINISTRATIVE | Facility: CLINIC | Age: 76
End: 2021-06-22

## 2021-06-22 ENCOUNTER — TELEPHONE (OUTPATIENT)
Dept: CARDIOLOGY | Facility: CLINIC | Age: 76
End: 2021-06-22

## 2021-06-22 RX ORDER — DILTIAZEM HYDROCHLORIDE EXTENDED-RELEASE TABLETS 360 MG/1
360 TABLET, EXTENDED RELEASE ORAL DAILY
Qty: 90 TABLET | Refills: 3 | Status: SHIPPED | OUTPATIENT
Start: 2021-06-22 | End: 2021-06-25

## 2021-06-23 ENCOUNTER — TELEPHONE (OUTPATIENT)
Dept: CARDIOLOGY | Facility: CLINIC | Age: 76
End: 2021-06-23

## 2021-06-25 ENCOUNTER — OFFICE VISIT (OUTPATIENT)
Dept: CARDIOLOGY | Facility: CLINIC | Age: 76
End: 2021-06-25
Payer: MEDICARE

## 2021-06-25 VITALS
BODY MASS INDEX: 41.77 KG/M2 | SYSTOLIC BLOOD PRESSURE: 144 MMHG | OXYGEN SATURATION: 96 % | HEART RATE: 68 BPM | WEIGHT: 244.69 LBS | HEIGHT: 64 IN | DIASTOLIC BLOOD PRESSURE: 64 MMHG

## 2021-06-25 DIAGNOSIS — E78.00 PURE HYPERCHOLESTEROLEMIA: ICD-10-CM

## 2021-06-25 DIAGNOSIS — I70.0 AORTIC ATHEROSCLEROSIS: ICD-10-CM

## 2021-06-25 DIAGNOSIS — R26.89 BALANCE PROBLEMS: ICD-10-CM

## 2021-06-25 DIAGNOSIS — I50.32 CHRONIC DIASTOLIC CHF (CONGESTIVE HEART FAILURE): ICD-10-CM

## 2021-06-25 DIAGNOSIS — K55.1 MESENTERIC ARTERY INSUFFICIENCY: ICD-10-CM

## 2021-06-25 DIAGNOSIS — I48.20 CHRONIC ATRIAL FIBRILLATION: ICD-10-CM

## 2021-06-25 DIAGNOSIS — I25.10 CORONARY ARTERY DISEASE INVOLVING NATIVE CORONARY ARTERY OF NATIVE HEART WITHOUT ANGINA PECTORIS: ICD-10-CM

## 2021-06-25 DIAGNOSIS — I10 ESSENTIAL HYPERTENSION: Primary | ICD-10-CM

## 2021-06-25 DIAGNOSIS — Z95.2 S/P TAVR (TRANSCATHETER AORTIC VALVE REPLACEMENT): ICD-10-CM

## 2021-06-25 DIAGNOSIS — E11.9 TYPE 2 DIABETES MELLITUS WITHOUT COMPLICATION, WITHOUT LONG-TERM CURRENT USE OF INSULIN: ICD-10-CM

## 2021-06-25 PROCEDURE — 1126F AMNT PAIN NOTED NONE PRSNT: CPT | Mod: S$GLB,,, | Performed by: INTERNAL MEDICINE

## 2021-06-25 PROCEDURE — 3288F PR FALLS RISK ASSESSMENT DOCUMENTED: ICD-10-PCS | Mod: CPTII,S$GLB,, | Performed by: INTERNAL MEDICINE

## 2021-06-25 PROCEDURE — 99999 PR PBB SHADOW E&M-EST. PATIENT-LVL III: CPT | Mod: PBBFAC,,, | Performed by: INTERNAL MEDICINE

## 2021-06-25 PROCEDURE — 3077F PR MOST RECENT SYSTOLIC BLOOD PRESSURE >= 140 MM HG: ICD-10-PCS | Mod: CPTII,S$GLB,, | Performed by: INTERNAL MEDICINE

## 2021-06-25 PROCEDURE — 3078F PR MOST RECENT DIASTOLIC BLOOD PRESSURE < 80 MM HG: ICD-10-PCS | Mod: CPTII,S$GLB,, | Performed by: INTERNAL MEDICINE

## 2021-06-25 PROCEDURE — 3078F DIAST BP <80 MM HG: CPT | Mod: CPTII,S$GLB,, | Performed by: INTERNAL MEDICINE

## 2021-06-25 PROCEDURE — 1101F PT FALLS ASSESS-DOCD LE1/YR: CPT | Mod: CPTII,S$GLB,, | Performed by: INTERNAL MEDICINE

## 2021-06-25 PROCEDURE — 99214 PR OFFICE/OUTPT VISIT, EST, LEVL IV, 30-39 MIN: ICD-10-PCS | Mod: S$GLB,,, | Performed by: INTERNAL MEDICINE

## 2021-06-25 PROCEDURE — 1159F MED LIST DOCD IN RCRD: CPT | Mod: S$GLB,,, | Performed by: INTERNAL MEDICINE

## 2021-06-25 PROCEDURE — 1126F PR PAIN SEVERITY QUANTIFIED, NO PAIN PRESENT: ICD-10-PCS | Mod: S$GLB,,, | Performed by: INTERNAL MEDICINE

## 2021-06-25 PROCEDURE — 99214 OFFICE O/P EST MOD 30 MIN: CPT | Mod: S$GLB,,, | Performed by: INTERNAL MEDICINE

## 2021-06-25 PROCEDURE — 1159F PR MEDICATION LIST DOCUMENTED IN MEDICAL RECORD: ICD-10-PCS | Mod: S$GLB,,, | Performed by: INTERNAL MEDICINE

## 2021-06-25 PROCEDURE — 3077F SYST BP >= 140 MM HG: CPT | Mod: CPTII,S$GLB,, | Performed by: INTERNAL MEDICINE

## 2021-06-25 PROCEDURE — 1157F PR ADVANCE CARE PLAN OR EQUIV PRESENT IN MEDICAL RECORD: ICD-10-PCS | Mod: S$GLB,,, | Performed by: INTERNAL MEDICINE

## 2021-06-25 PROCEDURE — 3288F FALL RISK ASSESSMENT DOCD: CPT | Mod: CPTII,S$GLB,, | Performed by: INTERNAL MEDICINE

## 2021-06-25 PROCEDURE — 99999 PR PBB SHADOW E&M-EST. PATIENT-LVL III: ICD-10-PCS | Mod: PBBFAC,,, | Performed by: INTERNAL MEDICINE

## 2021-06-25 PROCEDURE — 1101F PR PT FALLS ASSESS DOC 0-1 FALLS W/OUT INJ PAST YR: ICD-10-PCS | Mod: CPTII,S$GLB,, | Performed by: INTERNAL MEDICINE

## 2021-06-25 PROCEDURE — 1157F ADVNC CARE PLAN IN RCRD: CPT | Mod: S$GLB,,, | Performed by: INTERNAL MEDICINE

## 2021-06-25 RX ORDER — DILTIAZEM HYDROCHLORIDE EXTENDED-RELEASE TABLETS 360 MG/1
360 TABLET, EXTENDED RELEASE ORAL DAILY
Qty: 90 TABLET | Refills: 4 | Status: SHIPPED | OUTPATIENT
Start: 2021-06-25 | End: 2022-08-11 | Stop reason: DRUGHIGH

## 2021-07-02 ENCOUNTER — HOSPITAL ENCOUNTER (OUTPATIENT)
Dept: RADIOLOGY | Facility: HOSPITAL | Age: 76
Discharge: HOME | End: 2021-07-02
Attending: PHYSICIAN ASSISTANT
Payer: MEDICARE

## 2021-07-02 DIAGNOSIS — J32.9 CHRONIC SINUSITIS: ICD-10-CM

## 2021-07-02 PROCEDURE — 70486 CT MAXILLOFACIAL W/O DYE: CPT | Mod: TC,PO

## 2021-07-27 DIAGNOSIS — K55.1 CHRONIC MESENTERIC ISCHEMIA: Primary | ICD-10-CM

## 2021-08-17 ENCOUNTER — HOSPITAL ENCOUNTER (EMERGENCY)
Facility: HOSPITAL | Age: 76
Discharge: HOME | End: 2021-08-17
Attending: EMERGENCY MEDICINE
Payer: MEDICARE

## 2021-08-17 VITALS
TEMPERATURE: 99 F | BODY MASS INDEX: 41.83 KG/M2 | HEART RATE: 92 BPM | WEIGHT: 245 LBS | DIASTOLIC BLOOD PRESSURE: 72 MMHG | RESPIRATION RATE: 19 BRPM | SYSTOLIC BLOOD PRESSURE: 149 MMHG | HEIGHT: 64 IN | OXYGEN SATURATION: 97 %

## 2021-08-17 DIAGNOSIS — R07.9 CHEST PAIN: ICD-10-CM

## 2021-08-17 DIAGNOSIS — R00.2 PALPITATIONS: Primary | ICD-10-CM

## 2021-08-17 DIAGNOSIS — B37.2 INTERTRIGINOUS CANDIDIASIS: ICD-10-CM

## 2021-08-17 DIAGNOSIS — I49.9 CARDIAC ARRHYTHMIA, UNSPECIFIED CARDIAC ARRHYTHMIA TYPE: ICD-10-CM

## 2021-08-17 LAB
ALBUMIN SERPL BCP-MCNC: 3.7 G/DL (ref 3.5–5.2)
ALP SERPL-CCNC: 50 U/L (ref 55–135)
ALT SERPL W/O P-5'-P-CCNC: 23 U/L (ref 10–44)
ANION GAP SERPL CALC-SCNC: 11 MMOL/L (ref 8–16)
AST SERPL-CCNC: 23 U/L (ref 10–40)
BACTERIA #/AREA URNS AUTO: NORMAL /HPF
BASOPHILS # BLD AUTO: 0.05 K/UL (ref 0–0.2)
BASOPHILS NFR BLD: 0.6 % (ref 0–1.9)
BILIRUB SERPL-MCNC: 0.4 MG/DL (ref 0.1–1)
BILIRUB UR QL STRIP: NEGATIVE
BNP SERPL-MCNC: 184 PG/ML (ref 0–99)
BUN SERPL-MCNC: 12 MG/DL (ref 8–23)
CALCIUM SERPL-MCNC: 9.6 MG/DL (ref 8.7–10.5)
CHLORIDE SERPL-SCNC: 94 MMOL/L (ref 95–110)
CLARITY UR REFRACT.AUTO: CLEAR
CO2 SERPL-SCNC: 28 MMOL/L (ref 23–29)
COLOR UR AUTO: YELLOW
CREAT SERPL-MCNC: 0.7 MG/DL (ref 0.5–1.4)
CTP QC/QA: YES
DIFFERENTIAL METHOD: NORMAL
EOSINOPHIL # BLD AUTO: 0 K/UL (ref 0–0.5)
EOSINOPHIL NFR BLD: 0.1 % (ref 0–8)
ERYTHROCYTE [DISTWIDTH] IN BLOOD BY AUTOMATED COUNT: 14.5 % (ref 11.5–14.5)
EST. GFR  (AFRICAN AMERICAN): >60 ML/MIN/1.73 M^2
EST. GFR  (NON AFRICAN AMERICAN): >60 ML/MIN/1.73 M^2
GLUCOSE SERPL-MCNC: 91 MG/DL (ref 70–110)
GLUCOSE UR QL STRIP: NEGATIVE
HCT VFR BLD AUTO: 40.5 % (ref 37–48.5)
HGB BLD-MCNC: 13.5 G/DL (ref 12–16)
HGB UR QL STRIP: NEGATIVE
HYALINE CASTS UR QL AUTO: 0 /LPF
IMM GRANULOCYTES # BLD AUTO: 0.03 K/UL (ref 0–0.04)
IMM GRANULOCYTES NFR BLD AUTO: 0.4 % (ref 0–0.5)
KETONES UR QL STRIP: NEGATIVE
LEUKOCYTE ESTERASE UR QL STRIP: NEGATIVE
LYMPHOCYTES # BLD AUTO: 2.3 K/UL (ref 1–4.8)
LYMPHOCYTES NFR BLD: 27.4 % (ref 18–48)
MCH RBC QN AUTO: 30.4 PG (ref 27–31)
MCHC RBC AUTO-ENTMCNC: 33.3 G/DL (ref 32–36)
MCV RBC AUTO: 91 FL (ref 82–98)
MICROSCOPIC COMMENT: NORMAL
MONOCYTES # BLD AUTO: 0.8 K/UL (ref 0.3–1)
MONOCYTES NFR BLD: 9.6 % (ref 4–15)
NEUTROPHILS # BLD AUTO: 5.3 K/UL (ref 1.8–7.7)
NEUTROPHILS NFR BLD: 61.9 % (ref 38–73)
NITRITE UR QL STRIP: NEGATIVE
NRBC BLD-RTO: 0 /100 WBC
PH UR STRIP: 6 [PH] (ref 5–8)
PLATELET # BLD AUTO: 269 K/UL (ref 150–450)
PMV BLD AUTO: 9.8 FL (ref 9.2–12.9)
POTASSIUM SERPL-SCNC: 4.6 MMOL/L (ref 3.5–5.1)
PROT SERPL-MCNC: 6.9 G/DL (ref 6–8.4)
PROT UR QL STRIP: ABNORMAL
RBC # BLD AUTO: 4.44 M/UL (ref 4–5.4)
RBC #/AREA URNS AUTO: 0 /HPF (ref 0–4)
SARS-COV-2 RDRP RESP QL NAA+PROBE: NEGATIVE
SODIUM SERPL-SCNC: 133 MMOL/L (ref 136–145)
SP GR UR STRIP: 1.01 (ref 1–1.03)
SQUAMOUS #/AREA URNS AUTO: 3 /HPF
TROPONIN I SERPL DL<=0.01 NG/ML-MCNC: 0.01 NG/ML (ref 0–0.03)
TROPONIN I SERPL DL<=0.01 NG/ML-MCNC: <0.006 NG/ML (ref 0–0.03)
URN SPEC COLLECT METH UR: ABNORMAL
WBC # BLD AUTO: 8.48 K/UL (ref 3.9–12.7)
WBC #/AREA URNS AUTO: 2 /HPF (ref 0–5)

## 2021-08-17 PROCEDURE — U0002 COVID-19 LAB TEST NON-CDC: HCPCS | Performed by: PHYSICIAN ASSISTANT

## 2021-08-17 PROCEDURE — 83880 ASSAY OF NATRIURETIC PEPTIDE: CPT | Performed by: PHYSICIAN ASSISTANT

## 2021-08-17 PROCEDURE — 99285 EMERGENCY DEPT VISIT HI MDM: CPT | Mod: 25

## 2021-08-17 PROCEDURE — 93010 ELECTROCARDIOGRAM REPORT: CPT | Mod: ,,, | Performed by: INTERNAL MEDICINE

## 2021-08-17 PROCEDURE — 99285 EMERGENCY DEPT VISIT HI MDM: CPT | Mod: CS,,, | Performed by: PHYSICIAN ASSISTANT

## 2021-08-17 PROCEDURE — 36000 PLACE NEEDLE IN VEIN: CPT

## 2021-08-17 PROCEDURE — 85025 COMPLETE CBC W/AUTO DIFF WBC: CPT | Performed by: PHYSICIAN ASSISTANT

## 2021-08-17 PROCEDURE — 81001 URINALYSIS AUTO W/SCOPE: CPT | Performed by: PHYSICIAN ASSISTANT

## 2021-08-17 PROCEDURE — 99285 PR EMERGENCY DEPT VISIT,LEVEL V: ICD-10-PCS | Mod: CS,,, | Performed by: PHYSICIAN ASSISTANT

## 2021-08-17 PROCEDURE — 86803 HEPATITIS C AB TEST: CPT | Performed by: EMERGENCY MEDICINE

## 2021-08-17 PROCEDURE — 93005 ELECTROCARDIOGRAM TRACING: CPT

## 2021-08-17 PROCEDURE — 84484 ASSAY OF TROPONIN QUANT: CPT | Performed by: PHYSICIAN ASSISTANT

## 2021-08-17 PROCEDURE — 93010 EKG 12-LEAD: ICD-10-PCS | Mod: ,,, | Performed by: INTERNAL MEDICINE

## 2021-08-17 PROCEDURE — 80053 COMPREHEN METABOLIC PANEL: CPT | Performed by: PHYSICIAN ASSISTANT

## 2021-08-17 RX ORDER — NYSTATIN 100000 [USP'U]/G
POWDER TOPICAL EVERY 8 HOURS
Qty: 60 G | Refills: 0 | Status: ON HOLD | OUTPATIENT
Start: 2021-08-17 | End: 2022-08-12 | Stop reason: SDUPTHER

## 2021-08-19 LAB — HCV AB SERPL QL IA: NEGATIVE

## 2021-10-29 ENCOUNTER — IMMUNIZATION (OUTPATIENT)
Dept: PHARMACY | Facility: CLINIC | Age: 76
End: 2021-10-29
Payer: MEDICARE

## 2021-10-29 DIAGNOSIS — Z23 NEED FOR VACCINATION: Primary | ICD-10-CM

## 2021-12-04 ENCOUNTER — HOSPITAL ENCOUNTER (EMERGENCY)
Facility: HOSPITAL | Age: 76
Discharge: HOME | End: 2021-12-05
Attending: FAMILY MEDICINE
Payer: MEDICARE

## 2021-12-04 DIAGNOSIS — R56.9 SEIZURE: Primary | ICD-10-CM

## 2021-12-04 LAB
ALBUMIN SERPL BCP-MCNC: 3.9 G/DL (ref 3.5–5.2)
ALP SERPL-CCNC: 47 U/L (ref 55–135)
ALT SERPL W/O P-5'-P-CCNC: 22 U/L (ref 10–44)
ANION GAP SERPL CALC-SCNC: 18 MMOL/L (ref 8–16)
AST SERPL-CCNC: 22 U/L (ref 10–40)
BASOPHILS # BLD AUTO: 0.04 K/UL (ref 0–0.2)
BASOPHILS NFR BLD: 0.5 % (ref 0–1.9)
BILIRUB SERPL-MCNC: 0.2 MG/DL (ref 0.1–1)
BILIRUB UR QL STRIP: NEGATIVE
BUN SERPL-MCNC: 19 MG/DL (ref 8–23)
CALCIUM SERPL-MCNC: 9.4 MG/DL (ref 8.7–10.5)
CHLORIDE SERPL-SCNC: 100 MMOL/L (ref 95–110)
CLARITY UR: CLEAR
CO2 SERPL-SCNC: 22 MMOL/L (ref 23–29)
COLOR UR: YELLOW
CREAT SERPL-MCNC: 0.9 MG/DL (ref 0.5–1.4)
DIFFERENTIAL METHOD: ABNORMAL
EOSINOPHIL # BLD AUTO: 0.1 K/UL (ref 0–0.5)
EOSINOPHIL NFR BLD: 0.9 % (ref 0–8)
ERYTHROCYTE [DISTWIDTH] IN BLOOD BY AUTOMATED COUNT: 14.6 % (ref 11.5–14.5)
EST. GFR  (AFRICAN AMERICAN): >60 ML/MIN/1.73 M^2
EST. GFR  (NON AFRICAN AMERICAN): >60 ML/MIN/1.73 M^2
GLUCOSE SERPL-MCNC: 119 MG/DL (ref 70–110)
GLUCOSE UR QL STRIP: NEGATIVE
HCT VFR BLD AUTO: 40 % (ref 37–48.5)
HGB BLD-MCNC: 12.7 G/DL (ref 12–16)
HGB UR QL STRIP: NEGATIVE
IMM GRANULOCYTES # BLD AUTO: 0.03 K/UL (ref 0–0.04)
IMM GRANULOCYTES NFR BLD AUTO: 0.4 % (ref 0–0.5)
KETONES UR QL STRIP: NEGATIVE
LACTATE SERPL-SCNC: 2.4 MMOL/L (ref 0.5–2.2)
LEUKOCYTE ESTERASE UR QL STRIP: NEGATIVE
LYMPHOCYTES # BLD AUTO: 2.3 K/UL (ref 1–4.8)
LYMPHOCYTES NFR BLD: 30.5 % (ref 18–48)
MCH RBC QN AUTO: 30.2 PG (ref 27–31)
MCHC RBC AUTO-ENTMCNC: 31.8 G/DL (ref 32–36)
MCV RBC AUTO: 95 FL (ref 82–98)
MONOCYTES # BLD AUTO: 0.9 K/UL (ref 0.3–1)
MONOCYTES NFR BLD: 11.8 % (ref 4–15)
NEUTROPHILS # BLD AUTO: 4.3 K/UL (ref 1.8–7.7)
NEUTROPHILS NFR BLD: 55.9 % (ref 38–73)
NITRITE UR QL STRIP: NEGATIVE
NRBC BLD-RTO: 0 /100 WBC
PH UR STRIP: 6 [PH] (ref 5–8)
PLATELET # BLD AUTO: 288 K/UL (ref 150–450)
PMV BLD AUTO: 10 FL (ref 9.2–12.9)
POCT GLUCOSE: 136 MG/DL (ref 70–110)
POTASSIUM SERPL-SCNC: 4.4 MMOL/L (ref 3.5–5.1)
PROT SERPL-MCNC: 6.9 G/DL (ref 6–8.4)
PROT UR QL STRIP: NEGATIVE
RBC # BLD AUTO: 4.21 M/UL (ref 4–5.4)
SODIUM SERPL-SCNC: 140 MMOL/L (ref 136–145)
SP GR UR STRIP: 1.01 (ref 1–1.03)
URN SPEC COLLECT METH UR: NORMAL
UROBILINOGEN UR STRIP-ACNC: NEGATIVE EU/DL
WBC # BLD AUTO: 7.68 K/UL (ref 3.9–12.7)

## 2021-12-04 PROCEDURE — 87040 BLOOD CULTURE FOR BACTERIA: CPT | Performed by: FAMILY MEDICINE

## 2021-12-04 PROCEDURE — 96361 HYDRATE IV INFUSION ADD-ON: CPT

## 2021-12-04 PROCEDURE — 85025 COMPLETE CBC W/AUTO DIFF WBC: CPT | Performed by: FAMILY MEDICINE

## 2021-12-04 PROCEDURE — 93005 ELECTROCARDIOGRAM TRACING: CPT

## 2021-12-04 PROCEDURE — 99285 EMERGENCY DEPT VISIT HI MDM: CPT | Mod: 25

## 2021-12-04 PROCEDURE — 93010 EKG 12-LEAD: ICD-10-PCS | Mod: ,,, | Performed by: INTERNAL MEDICINE

## 2021-12-04 PROCEDURE — 81003 URINALYSIS AUTO W/O SCOPE: CPT | Performed by: FAMILY MEDICINE

## 2021-12-04 PROCEDURE — 83605 ASSAY OF LACTIC ACID: CPT | Performed by: FAMILY MEDICINE

## 2021-12-04 PROCEDURE — 80053 COMPREHEN METABOLIC PANEL: CPT | Performed by: FAMILY MEDICINE

## 2021-12-04 PROCEDURE — 82962 GLUCOSE BLOOD TEST: CPT

## 2021-12-04 PROCEDURE — 63600175 PHARM REV CODE 636 W HCPCS: Performed by: FAMILY MEDICINE

## 2021-12-04 PROCEDURE — 93010 ELECTROCARDIOGRAM REPORT: CPT | Mod: ,,, | Performed by: INTERNAL MEDICINE

## 2021-12-04 PROCEDURE — 96374 THER/PROPH/DIAG INJ IV PUSH: CPT

## 2021-12-04 RX ORDER — HYDRALAZINE HYDROCHLORIDE 20 MG/ML
10 INJECTION INTRAMUSCULAR; INTRAVENOUS
Status: COMPLETED | OUTPATIENT
Start: 2021-12-04 | End: 2021-12-04

## 2021-12-04 RX ADMIN — SODIUM CHLORIDE, SODIUM LACTATE, POTASSIUM CHLORIDE, AND CALCIUM CHLORIDE 1000 ML: .6; .31; .03; .02 INJECTION, SOLUTION INTRAVENOUS at 10:12

## 2021-12-04 RX ADMIN — HYDRALAZINE HYDROCHLORIDE 10 MG: 20 INJECTION INTRAMUSCULAR; INTRAVENOUS at 11:12

## 2021-12-05 VITALS
DIASTOLIC BLOOD PRESSURE: 67 MMHG | HEART RATE: 81 BPM | BODY MASS INDEX: 43.02 KG/M2 | TEMPERATURE: 98 F | WEIGHT: 250.63 LBS | SYSTOLIC BLOOD PRESSURE: 149 MMHG | OXYGEN SATURATION: 95 % | RESPIRATION RATE: 21 BRPM

## 2021-12-05 RX ORDER — CLONIDINE HYDROCHLORIDE 0.2 MG/1
0.2 TABLET ORAL
Status: DISCONTINUED | OUTPATIENT
Start: 2021-12-05 | End: 2021-12-05 | Stop reason: HOSPADM

## 2021-12-07 ENCOUNTER — TELEPHONE (OUTPATIENT)
Dept: NEUROLOGY | Facility: CLINIC | Age: 76
End: 2021-12-07
Payer: MEDICARE

## 2021-12-08 ENCOUNTER — OFFICE VISIT (OUTPATIENT)
Dept: NEUROLOGY | Facility: CLINIC | Age: 76
End: 2021-12-08
Payer: MEDICARE

## 2021-12-08 VITALS
HEIGHT: 64 IN | SYSTOLIC BLOOD PRESSURE: 112 MMHG | RESPIRATION RATE: 16 BRPM | DIASTOLIC BLOOD PRESSURE: 78 MMHG | OXYGEN SATURATION: 96 % | BODY MASS INDEX: 43.02 KG/M2 | HEART RATE: 90 BPM

## 2021-12-08 DIAGNOSIS — I48.20 CHRONIC ATRIAL FIBRILLATION: ICD-10-CM

## 2021-12-08 DIAGNOSIS — R56.9 SEIZURE-LIKE ACTIVITY: Primary | ICD-10-CM

## 2021-12-08 DIAGNOSIS — E11.42 DIABETIC POLYNEUROPATHY ASSOCIATED WITH TYPE 2 DIABETES MELLITUS: ICD-10-CM

## 2021-12-08 DIAGNOSIS — Z86.73 HISTORY OF CVA IN ADULTHOOD: ICD-10-CM

## 2021-12-08 DIAGNOSIS — G62.9 NEUROPATHY: ICD-10-CM

## 2021-12-08 PROCEDURE — 1157F ADVNC CARE PLAN IN RCRD: CPT | Mod: CPTII,S$GLB,, | Performed by: NURSE PRACTITIONER

## 2021-12-08 PROCEDURE — 99999 PR PBB SHADOW E&M-EST. PATIENT-LVL V: ICD-10-PCS | Mod: PBBFAC,,, | Performed by: NURSE PRACTITIONER

## 2021-12-08 PROCEDURE — 99999 PR PBB SHADOW E&M-EST. PATIENT-LVL V: CPT | Mod: PBBFAC,,, | Performed by: NURSE PRACTITIONER

## 2021-12-08 PROCEDURE — 99215 OFFICE O/P EST HI 40 MIN: CPT | Mod: S$GLB,,, | Performed by: NURSE PRACTITIONER

## 2021-12-08 PROCEDURE — 99417 PR PROLONGED SVC, OUTPT, W/WO DIRECT PT CONTACT,  EA ADDTL 15 MIN: ICD-10-PCS | Mod: S$GLB,,, | Performed by: NURSE PRACTITIONER

## 2021-12-08 PROCEDURE — 99417 PROLNG OP E/M EACH 15 MIN: CPT | Mod: S$GLB,,, | Performed by: NURSE PRACTITIONER

## 2021-12-08 PROCEDURE — 99215 PR OFFICE/OUTPT VISIT, EST, LEVL V, 40-54 MIN: ICD-10-PCS | Mod: S$GLB,,, | Performed by: NURSE PRACTITIONER

## 2021-12-08 PROCEDURE — 1157F PR ADVANCE CARE PLAN OR EQUIV PRESENT IN MEDICAL RECORD: ICD-10-PCS | Mod: CPTII,S$GLB,, | Performed by: NURSE PRACTITIONER

## 2021-12-08 RX ORDER — GABAPENTIN 300 MG/1
600 CAPSULE ORAL 3 TIMES DAILY
Qty: 540 CAPSULE | Refills: 3 | Status: ON HOLD | OUTPATIENT
Start: 2021-12-08 | End: 2022-08-12 | Stop reason: SDUPTHER

## 2021-12-10 LAB
BACTERIA BLD CULT: NORMAL
BACTERIA BLD CULT: NORMAL

## 2021-12-13 ENCOUNTER — PROCEDURE VISIT (OUTPATIENT)
Dept: NEUROLOGY | Facility: CLINIC | Age: 76
End: 2021-12-13
Payer: MEDICARE

## 2021-12-13 DIAGNOSIS — R20.0 NUMBNESS AND TINGLING: Primary | ICD-10-CM

## 2021-12-13 DIAGNOSIS — E11.42 DIABETIC POLYNEUROPATHY ASSOCIATED WITH TYPE 2 DIABETES MELLITUS: ICD-10-CM

## 2021-12-13 DIAGNOSIS — R20.2 NUMBNESS AND TINGLING: Primary | ICD-10-CM

## 2021-12-13 DIAGNOSIS — G62.9 NEUROPATHY: ICD-10-CM

## 2021-12-13 PROCEDURE — 95911 NRV CNDJ TEST 9-10 STUDIES: CPT | Mod: S$GLB,,, | Performed by: PSYCHIATRY & NEUROLOGY

## 2021-12-13 PROCEDURE — 95911 PR NERVE CONDUCTION STUDY; 9-10 STUDIES: ICD-10-PCS | Mod: S$GLB,,, | Performed by: PSYCHIATRY & NEUROLOGY

## 2022-01-11 ENCOUNTER — HOSPITAL ENCOUNTER (OUTPATIENT)
Dept: PULMONOLOGY | Facility: HOSPITAL | Age: 77
Discharge: HOME | End: 2022-01-11
Attending: NURSE PRACTITIONER
Payer: MEDICARE

## 2022-01-11 ENCOUNTER — HOSPITAL ENCOUNTER (OUTPATIENT)
Dept: RADIOLOGY | Facility: HOSPITAL | Age: 77
Discharge: HOME | End: 2022-01-11
Attending: NURSE PRACTITIONER
Payer: MEDICARE

## 2022-01-11 DIAGNOSIS — R56.9 SEIZURE-LIKE ACTIVITY: ICD-10-CM

## 2022-01-11 PROCEDURE — 95816 EEG AWAKE AND DROWSY: CPT

## 2022-01-11 PROCEDURE — 70551 MRI BRAIN STEM W/O DYE: CPT | Mod: TC

## 2022-01-11 NOTE — PROGRESS NOTES
MRI Brain Reviewed    1-    No acute intracranial abnormality. Tiny, remote right thalamic lacunar type infarct. Mild chronic microvascular ischemic changes.    Unremarkable MRI

## 2022-08-11 ENCOUNTER — HOSPITAL ENCOUNTER (OUTPATIENT)
Facility: HOSPITAL | Age: 77
Discharge: HOME | End: 2022-08-12
Attending: EMERGENCY MEDICINE | Admitting: INTERNAL MEDICINE
Payer: MEDICARE

## 2022-08-11 DIAGNOSIS — I25.10 CAD (CORONARY ARTERY DISEASE): ICD-10-CM

## 2022-08-11 DIAGNOSIS — I70.0 AORTIC ATHEROSCLEROSIS: ICD-10-CM

## 2022-08-11 DIAGNOSIS — R07.89 OTHER CHEST PAIN: ICD-10-CM

## 2022-08-11 DIAGNOSIS — I50.32 CHRONIC DIASTOLIC CHF (CONGESTIVE HEART FAILURE): ICD-10-CM

## 2022-08-11 DIAGNOSIS — I48.19 PERSISTENT ATRIAL FIBRILLATION: ICD-10-CM

## 2022-08-11 DIAGNOSIS — R07.9 CHEST PAIN, UNSPECIFIED TYPE: Primary | ICD-10-CM

## 2022-08-11 DIAGNOSIS — R07.9 CHEST PAIN: ICD-10-CM

## 2022-08-11 DIAGNOSIS — I10 ESSENTIAL HYPERTENSION: ICD-10-CM

## 2022-08-11 DIAGNOSIS — E11.42 DIABETIC POLYNEUROPATHY ASSOCIATED WITH TYPE 2 DIABETES MELLITUS: ICD-10-CM

## 2022-08-11 DIAGNOSIS — G62.9 NEUROPATHY: ICD-10-CM

## 2022-08-11 LAB
ALBUMIN SERPL BCP-MCNC: 4.1 G/DL (ref 3.5–5.2)
ALP SERPL-CCNC: 44 U/L (ref 55–135)
ALT SERPL W/O P-5'-P-CCNC: 21 U/L (ref 10–44)
ANION GAP SERPL CALC-SCNC: 16 MMOL/L (ref 8–16)
AORTIC ROOT ANNULUS: 2.5 CM
AORTIC VALVE CUSP SEPERATION: 1.39 CM
AST SERPL-CCNC: 17 U/L (ref 10–40)
AV INDEX (PROSTH): 0.68
AV MEAN GRADIENT: 8 MMHG
AV PEAK GRADIENT: 15 MMHG
AV VALVE AREA: 2.83 CM2
AV VELOCITY RATIO: 0.63
BASOPHILS # BLD AUTO: 0.01 K/UL (ref 0–0.2)
BASOPHILS NFR BLD: 0.1 % (ref 0–1.9)
BILIRUB SERPL-MCNC: 0.5 MG/DL (ref 0.1–1)
BNP SERPL-MCNC: 269 PG/ML (ref 0–99)
BSA FOR ECHO PROCEDURE: 2.26 M2
BUN SERPL-MCNC: 19 MG/DL (ref 8–23)
CALCIUM SERPL-MCNC: 9.9 MG/DL (ref 8.7–10.5)
CHLORIDE SERPL-SCNC: 95 MMOL/L (ref 95–110)
CHOLEST SERPL-MCNC: 168 MG/DL (ref 120–199)
CHOLEST/HDLC SERPL: 2.4 {RATIO} (ref 2–5)
CK SERPL-CCNC: 54 U/L (ref 20–180)
CO2 SERPL-SCNC: 27 MMOL/L (ref 23–29)
CREAT SERPL-MCNC: 0.8 MG/DL (ref 0.5–1.4)
CTP QC/QA: YES
CV ECHO LV RWT: 0.38 CM
DIFFERENTIAL METHOD: ABNORMAL
DOP CALC AO PEAK VEL: 1.95 M/S
DOP CALC AO VTI: 40.49 CM
DOP CALC LVOT AREA: 4.2 CM2
DOP CALC LVOT DIAMETER: 2.3 CM
DOP CALC LVOT PEAK VEL: 1.22 M/S
DOP CALC LVOT STROKE VOLUME: 114.74 CM3
DOP CALC MV VTI: 29.59 CM
DOP CALCLVOT PEAK VEL VTI: 27.63 CM
E WAVE DECELERATION TIME: 146.05 MSEC
E/A RATIO: 2.88
E/E' RATIO: 21.23 M/S
ECHO LV POSTERIOR WALL: 0.9 CM (ref 0.6–1.1)
EJECTION FRACTION: 55 %
EOSINOPHIL # BLD AUTO: 0 K/UL (ref 0–0.5)
EOSINOPHIL NFR BLD: 0 % (ref 0–8)
ERYTHROCYTE [DISTWIDTH] IN BLOOD BY AUTOMATED COUNT: 13.7 % (ref 11.5–14.5)
EST. GFR  (NO RACE VARIABLE): >60 ML/MIN/1.73 M^2
ESTIMATED AVG GLUCOSE: 120 MG/DL (ref 68–131)
FRACTIONAL SHORTENING: 58 % (ref 28–44)
GLUCOSE SERPL-MCNC: 140 MG/DL (ref 70–110)
HBA1C MFR BLD: 5.8 % (ref 4–5.6)
HCT VFR BLD AUTO: 39.7 % (ref 37–48.5)
HDLC SERPL-MCNC: 69 MG/DL (ref 40–75)
HDLC SERPL: 41.1 % (ref 20–50)
HGB BLD-MCNC: 13.2 G/DL (ref 12–16)
IMM GRANULOCYTES # BLD AUTO: 0.04 K/UL (ref 0–0.04)
IMM GRANULOCYTES NFR BLD AUTO: 0.5 % (ref 0–0.5)
INTERVENTRICULAR SEPTUM: 0.97 CM (ref 0.6–1.1)
IVRT: 74.22 MSEC
LA MAJOR: 5.68 CM
LA MINOR: 6.3 CM
LA WIDTH: 4 CM
LDLC SERPL CALC-MCNC: 87 MG/DL (ref 63–159)
LEFT ATRIUM SIZE: 5 CM
LEFT ATRIUM VOLUME INDEX MOD: 28.6 ML/M2
LEFT ATRIUM VOLUME INDEX: 47.2 ML/M2
LEFT ATRIUM VOLUME MOD: 61.58 CM3
LEFT ATRIUM VOLUME: 101.56 CM3
LEFT INTERNAL DIMENSION IN SYSTOLE: 2 CM (ref 2.1–4)
LEFT VENTRICLE DIASTOLIC VOLUME INDEX: 57.25 ML/M2
LEFT VENTRICLE DIASTOLIC VOLUME: 123.09 ML
LEFT VENTRICLE MASS INDEX: 72 G/M2
LEFT VENTRICLE SYSTOLIC VOLUME INDEX: 34.1 ML/M2
LEFT VENTRICLE SYSTOLIC VOLUME: 73.34 ML
LEFT VENTRICULAR INTERNAL DIMENSION IN DIASTOLE: 4.8 CM (ref 3.5–6)
LEFT VENTRICULAR MASS: 155.48 G
LV LATERAL E/E' RATIO: 19.71 M/S
LV SEPTAL E/E' RATIO: 23 M/S
LYMPHOCYTES # BLD AUTO: 1.4 K/UL (ref 1–4.8)
LYMPHOCYTES NFR BLD: 16.1 % (ref 18–48)
MAGNESIUM SERPL-MCNC: 1.5 MG/DL (ref 1.6–2.6)
MCH RBC QN AUTO: 31 PG (ref 27–31)
MCHC RBC AUTO-ENTMCNC: 33.2 G/DL (ref 32–36)
MCV RBC AUTO: 93 FL (ref 82–98)
MONOCYTES # BLD AUTO: 0.3 K/UL (ref 0.3–1)
MONOCYTES NFR BLD: 3.5 % (ref 4–15)
MV MEAN GRADIENT: 1 MMHG
MV PEAK A VEL: 0.48 M/S
MV PEAK E VEL: 1.38 M/S
MV PEAK GRADIENT: 6 MMHG
MV STENOSIS PRESSURE HALF TIME: 42.36 MS
MV VALVE AREA BY CONTINUITY EQUATION: 3.88 CM2
MV VALVE AREA P 1/2 METHOD: 5.19 CM2
NEUTROPHILS # BLD AUTO: 6.7 K/UL (ref 1.8–7.7)
NEUTROPHILS NFR BLD: 79.8 % (ref 38–73)
NONHDLC SERPL-MCNC: 99 MG/DL
NRBC BLD-RTO: 0 /100 WBC
PHOSPHATE SERPL-MCNC: 3.9 MG/DL (ref 2.7–4.5)
PISA MRMAX VEL: 0.04 M/S
PISA TR MAX VEL: 3.54 M/S
PLATELET # BLD AUTO: 237 K/UL (ref 150–450)
PMV BLD AUTO: 10.2 FL (ref 9.2–12.9)
POTASSIUM SERPL-SCNC: 4.3 MMOL/L (ref 3.5–5.1)
PROT SERPL-MCNC: 7.7 G/DL (ref 6–8.4)
PV PEAK VELOCITY: 0.87 CM/S
RA MAJOR: 5.1 CM
RA PRESSURE: 3 MMHG
RA WIDTH: 2.7 CM
RBC # BLD AUTO: 4.26 M/UL (ref 4–5.4)
RIGHT VENTRICULAR END-DIASTOLIC DIMENSION: 2.35 CM
RIGHT VENTRICULAR LENGTH IN DIASTOLE (APICAL 4-CHAMBER VIEW): 5.6 CM
SARS-COV-2 RDRP RESP QL NAA+PROBE: NEGATIVE
SODIUM SERPL-SCNC: 138 MMOL/L (ref 136–145)
T4 FREE SERPL-MCNC: 1.03 NG/DL (ref 0.71–1.51)
TDI LATERAL: 0.07 M/S
TDI SEPTAL: 0.06 M/S
TDI: 0.07 M/S
TR MAX PG: 50 MMHG
TRICUSPID ANNULAR PLANE SYSTOLIC EXCURSION: 1.5 CM
TRIGL SERPL-MCNC: 60 MG/DL (ref 30–150)
TROPONIN I SERPL DL<=0.01 NG/ML-MCNC: 0.01 NG/ML (ref 0–0.03)
TROPONIN I SERPL DL<=0.01 NG/ML-MCNC: 0.01 NG/ML (ref 0–0.03)
TROPONIN I SERPL DL<=0.01 NG/ML-MCNC: <0.006 NG/ML (ref 0–0.03)
TSH SERPL DL<=0.005 MIU/L-ACNC: 0.25 UIU/ML (ref 0.4–4)
TV REST PULMONARY ARTERY PRESSURE: 53 MMHG
WBC # BLD AUTO: 8.38 K/UL (ref 3.9–12.7)

## 2022-08-11 PROCEDURE — 85025 COMPLETE CBC W/AUTO DIFF WBC: CPT | Performed by: EMERGENCY MEDICINE

## 2022-08-11 PROCEDURE — 84484 ASSAY OF TROPONIN QUANT: CPT | Performed by: EMERGENCY MEDICINE

## 2022-08-11 PROCEDURE — 25000003 PHARM REV CODE 250: Performed by: STUDENT IN AN ORGANIZED HEALTH CARE EDUCATION/TRAINING PROGRAM

## 2022-08-11 PROCEDURE — 84100 ASSAY OF PHOSPHORUS: CPT | Performed by: STUDENT IN AN ORGANIZED HEALTH CARE EDUCATION/TRAINING PROGRAM

## 2022-08-11 PROCEDURE — 93010 ELECTROCARDIOGRAM REPORT: CPT | Mod: ,,, | Performed by: INTERNAL MEDICINE

## 2022-08-11 PROCEDURE — 99285 EMERGENCY DEPT VISIT HI MDM: CPT | Mod: 25

## 2022-08-11 PROCEDURE — G0378 HOSPITAL OBSERVATION PER HR: HCPCS

## 2022-08-11 PROCEDURE — 99220 PR INITIAL OBSERVATION CARE,LEVL III: ICD-10-PCS | Mod: ,,, | Performed by: NURSE PRACTITIONER

## 2022-08-11 PROCEDURE — 93010 ELECTROCARDIOGRAM REPORT: CPT | Mod: 76,,, | Performed by: INTERNAL MEDICINE

## 2022-08-11 PROCEDURE — 84439 ASSAY OF FREE THYROXINE: CPT | Performed by: STUDENT IN AN ORGANIZED HEALTH CARE EDUCATION/TRAINING PROGRAM

## 2022-08-11 PROCEDURE — 82550 ASSAY OF CK (CPK): CPT | Performed by: EMERGENCY MEDICINE

## 2022-08-11 PROCEDURE — 93005 ELECTROCARDIOGRAM TRACING: CPT

## 2022-08-11 PROCEDURE — 25000242 PHARM REV CODE 250 ALT 637 W/ HCPCS: Performed by: EMERGENCY MEDICINE

## 2022-08-11 PROCEDURE — 93010 EKG 12-LEAD: ICD-10-PCS | Mod: ,,, | Performed by: INTERNAL MEDICINE

## 2022-08-11 PROCEDURE — 83036 HEMOGLOBIN GLYCOSYLATED A1C: CPT | Performed by: STUDENT IN AN ORGANIZED HEALTH CARE EDUCATION/TRAINING PROGRAM

## 2022-08-11 PROCEDURE — 80053 COMPREHEN METABOLIC PANEL: CPT | Performed by: EMERGENCY MEDICINE

## 2022-08-11 PROCEDURE — 96372 THER/PROPH/DIAG INJ SC/IM: CPT | Performed by: STUDENT IN AN ORGANIZED HEALTH CARE EDUCATION/TRAINING PROGRAM

## 2022-08-11 PROCEDURE — 84484 ASSAY OF TROPONIN QUANT: CPT | Mod: 91 | Performed by: STUDENT IN AN ORGANIZED HEALTH CARE EDUCATION/TRAINING PROGRAM

## 2022-08-11 PROCEDURE — 99220 PR INITIAL OBSERVATION CARE,LEVL III: CPT | Mod: ,,, | Performed by: NURSE PRACTITIONER

## 2022-08-11 PROCEDURE — 99900035 HC TECH TIME PER 15 MIN (STAT)

## 2022-08-11 PROCEDURE — 63600175 PHARM REV CODE 636 W HCPCS: Performed by: STUDENT IN AN ORGANIZED HEALTH CARE EDUCATION/TRAINING PROGRAM

## 2022-08-11 PROCEDURE — 83880 ASSAY OF NATRIURETIC PEPTIDE: CPT | Performed by: STUDENT IN AN ORGANIZED HEALTH CARE EDUCATION/TRAINING PROGRAM

## 2022-08-11 PROCEDURE — 83735 ASSAY OF MAGNESIUM: CPT | Performed by: STUDENT IN AN ORGANIZED HEALTH CARE EDUCATION/TRAINING PROGRAM

## 2022-08-11 PROCEDURE — 80061 LIPID PANEL: CPT | Performed by: STUDENT IN AN ORGANIZED HEALTH CARE EDUCATION/TRAINING PROGRAM

## 2022-08-11 PROCEDURE — 84443 ASSAY THYROID STIM HORMONE: CPT | Performed by: STUDENT IN AN ORGANIZED HEALTH CARE EDUCATION/TRAINING PROGRAM

## 2022-08-11 PROCEDURE — U0002 COVID-19 LAB TEST NON-CDC: HCPCS | Performed by: EMERGENCY MEDICINE

## 2022-08-11 RX ORDER — OXYBUTYNIN CHLORIDE 5 MG/1
5 TABLET, EXTENDED RELEASE ORAL EVERY OTHER DAY
Status: DISCONTINUED | OUTPATIENT
Start: 2022-08-12 | End: 2022-08-12 | Stop reason: HOSPADM

## 2022-08-11 RX ORDER — MICONAZOLE NITRATE 2 %
POWDER (GRAM) TOPICAL 2 TIMES DAILY
Refills: 0 | Status: DISCONTINUED | OUTPATIENT
Start: 2022-08-11 | End: 2022-08-12 | Stop reason: HOSPADM

## 2022-08-11 RX ORDER — FLUTICASONE PROPIONATE 50 MCG
1 SPRAY, SUSPENSION (ML) NASAL EVERY MORNING
Status: DISCONTINUED | OUTPATIENT
Start: 2022-08-11 | End: 2022-08-12 | Stop reason: HOSPADM

## 2022-08-11 RX ORDER — LORAZEPAM 0.5 MG/1
0.5 TABLET ORAL EVERY MORNING
Status: DISCONTINUED | OUTPATIENT
Start: 2022-08-11 | End: 2022-08-12 | Stop reason: HOSPADM

## 2022-08-11 RX ORDER — NITROGLYCERIN 0.4 MG/1
0.4 TABLET SUBLINGUAL EVERY 5 MIN PRN
Status: ON HOLD | COMMUNITY
End: 2022-08-12 | Stop reason: SDUPTHER

## 2022-08-11 RX ORDER — CLOPIDOGREL BISULFATE 75 MG/1
75 TABLET ORAL DAILY
Status: ON HOLD | COMMUNITY
End: 2022-08-12 | Stop reason: SDUPTHER

## 2022-08-11 RX ORDER — CARVEDILOL 25 MG/1
25 TABLET ORAL 2 TIMES DAILY
Status: DISCONTINUED | OUTPATIENT
Start: 2022-08-11 | End: 2022-08-11

## 2022-08-11 RX ORDER — ENOXAPARIN SODIUM 100 MG/ML
40 INJECTION SUBCUTANEOUS ONCE
Status: COMPLETED | OUTPATIENT
Start: 2022-08-11 | End: 2022-08-11

## 2022-08-11 RX ORDER — RANOLAZINE 500 MG/1
500 TABLET, EXTENDED RELEASE ORAL 2 TIMES DAILY
Status: ON HOLD | COMMUNITY
Start: 2022-06-23 | End: 2022-08-12 | Stop reason: SDUPTHER

## 2022-08-11 RX ORDER — RANOLAZINE 500 MG/1
500 TABLET, EXTENDED RELEASE ORAL 2 TIMES DAILY
Status: DISCONTINUED | OUTPATIENT
Start: 2022-08-11 | End: 2022-08-12

## 2022-08-11 RX ORDER — LOSARTAN POTASSIUM 25 MG/1
25 TABLET ORAL DAILY
Status: ON HOLD | COMMUNITY
End: 2022-08-12 | Stop reason: SDUPTHER

## 2022-08-11 RX ORDER — NITROGLYCERIN 0.4 MG/1
0.4 TABLET SUBLINGUAL EVERY 5 MIN PRN
Status: DISCONTINUED | OUTPATIENT
Start: 2022-08-11 | End: 2022-08-12 | Stop reason: HOSPADM

## 2022-08-11 RX ORDER — PREDNISONE 20 MG/1
40 TABLET ORAL DAILY
Status: ON HOLD | COMMUNITY
Start: 2022-02-17 | End: 2022-08-12 | Stop reason: HOSPADM

## 2022-08-11 RX ORDER — DULOXETIN HYDROCHLORIDE 20 MG/1
20 CAPSULE, DELAYED RELEASE ORAL DAILY
Status: DISCONTINUED | OUTPATIENT
Start: 2022-08-11 | End: 2022-08-12 | Stop reason: HOSPADM

## 2022-08-11 RX ORDER — PANTOPRAZOLE SODIUM 40 MG/1
40 TABLET, DELAYED RELEASE ORAL DAILY
Status: DISCONTINUED | OUTPATIENT
Start: 2022-08-11 | End: 2022-08-12 | Stop reason: HOSPADM

## 2022-08-11 RX ORDER — LANOLIN ALCOHOL/MO/W.PET/CERES
1000 CREAM (GRAM) TOPICAL DAILY
Status: DISCONTINUED | OUTPATIENT
Start: 2022-08-11 | End: 2022-08-12 | Stop reason: HOSPADM

## 2022-08-11 RX ORDER — BISACODYL 5 MG
5 TABLET, DELAYED RELEASE (ENTERIC COATED) ORAL DAILY PRN
Status: DISCONTINUED | OUTPATIENT
Start: 2022-08-11 | End: 2022-08-12 | Stop reason: HOSPADM

## 2022-08-11 RX ORDER — AMOXICILLIN 500 MG/1
CAPSULE ORAL
COMMUNITY
Start: 2022-04-11 | End: 2022-08-11

## 2022-08-11 RX ORDER — DILTIAZEM HYDROCHLORIDE 120 MG/1
240 CAPSULE, COATED, EXTENDED RELEASE ORAL DAILY
Status: DISCONTINUED | OUTPATIENT
Start: 2022-08-11 | End: 2022-08-12 | Stop reason: HOSPADM

## 2022-08-11 RX ORDER — CLOPIDOGREL BISULFATE 75 MG/1
75 TABLET ORAL DAILY
Status: DISCONTINUED | OUTPATIENT
Start: 2022-08-11 | End: 2022-08-12 | Stop reason: HOSPADM

## 2022-08-11 RX ORDER — CARVEDILOL 6.25 MG/1
6.25 TABLET ORAL 2 TIMES DAILY
Status: ON HOLD | COMMUNITY
Start: 2022-06-17 | End: 2022-08-12 | Stop reason: SDUPTHER

## 2022-08-11 RX ORDER — NITROGLYCERIN 0.4 MG/1
0.4 TABLET SUBLINGUAL EVERY 5 MIN PRN
Status: DISCONTINUED | OUTPATIENT
Start: 2022-08-11 | End: 2022-08-11

## 2022-08-11 RX ORDER — SODIUM CHLORIDE 0.9 % (FLUSH) 0.9 %
10 SYRINGE (ML) INJECTION
Status: DISCONTINUED | OUTPATIENT
Start: 2022-08-11 | End: 2022-08-12 | Stop reason: HOSPADM

## 2022-08-11 RX ORDER — CETIRIZINE HYDROCHLORIDE 10 MG/1
10 TABLET ORAL DAILY
Status: DISCONTINUED | OUTPATIENT
Start: 2022-08-11 | End: 2022-08-12 | Stop reason: HOSPADM

## 2022-08-11 RX ORDER — FUROSEMIDE 40 MG/1
40 TABLET ORAL DAILY
Status: DISCONTINUED | OUTPATIENT
Start: 2022-08-11 | End: 2022-08-12 | Stop reason: HOSPADM

## 2022-08-11 RX ORDER — LOSARTAN POTASSIUM 25 MG/1
25 TABLET ORAL DAILY
Status: DISCONTINUED | OUTPATIENT
Start: 2022-08-11 | End: 2022-08-12 | Stop reason: HOSPADM

## 2022-08-11 RX ORDER — LEVALBUTEROL INHALATION SOLUTION 1.25 MG/3ML
SOLUTION RESPIRATORY (INHALATION)
COMMUNITY
Start: 2022-02-17 | End: 2022-09-30

## 2022-08-11 RX ORDER — ALBUTEROL SULFATE 2.5 MG/.5ML
2.5 SOLUTION RESPIRATORY (INHALATION) EVERY 4 HOURS PRN
Status: DISCONTINUED | OUTPATIENT
Start: 2022-08-11 | End: 2022-08-12 | Stop reason: HOSPADM

## 2022-08-11 RX ORDER — CARVEDILOL 6.25 MG/1
6.25 TABLET ORAL 2 TIMES DAILY
Status: DISCONTINUED | OUTPATIENT
Start: 2022-08-11 | End: 2022-08-12 | Stop reason: HOSPADM

## 2022-08-11 RX ORDER — ATORVASTATIN CALCIUM 10 MG/1
10 TABLET, FILM COATED ORAL DAILY
Status: ON HOLD | COMMUNITY
End: 2022-08-12 | Stop reason: SDUPTHER

## 2022-08-11 RX ORDER — GABAPENTIN 300 MG/1
600 CAPSULE ORAL 3 TIMES DAILY
Status: DISCONTINUED | OUTPATIENT
Start: 2022-08-11 | End: 2022-08-12 | Stop reason: HOSPADM

## 2022-08-11 RX ORDER — ISOSORBIDE MONONITRATE 60 MG/1
60 TABLET, EXTENDED RELEASE ORAL DAILY
Status: ON HOLD | COMMUNITY
Start: 2022-05-21 | End: 2022-08-12 | Stop reason: SDUPTHER

## 2022-08-11 RX ORDER — ISOSORBIDE MONONITRATE 30 MG/1
60 TABLET, EXTENDED RELEASE ORAL DAILY
Status: DISCONTINUED | OUTPATIENT
Start: 2022-08-11 | End: 2022-08-12 | Stop reason: HOSPADM

## 2022-08-11 RX ORDER — BUDESONIDE AND FORMOTEROL FUMARATE DIHYDRATE 160; 4.5 UG/1; UG/1
1 AEROSOL RESPIRATORY (INHALATION) 2 TIMES DAILY
COMMUNITY
Start: 2022-05-20

## 2022-08-11 RX ORDER — DILTIAZEM HYDROCHLORIDE 240 MG/1
240 CAPSULE, COATED, EXTENDED RELEASE ORAL DAILY
Status: ON HOLD | COMMUNITY
Start: 2022-06-17 | End: 2022-08-12 | Stop reason: SDUPTHER

## 2022-08-11 RX ORDER — ONDANSETRON 4 MG/1
4 TABLET, ORALLY DISINTEGRATING ORAL EVERY 8 HOURS PRN
Status: DISCONTINUED | OUTPATIENT
Start: 2022-08-11 | End: 2022-08-12 | Stop reason: HOSPADM

## 2022-08-11 RX ORDER — MUPIROCIN 20 MG/G
OINTMENT TOPICAL 2 TIMES DAILY
Status: ON HOLD | COMMUNITY
Start: 2022-02-18 | End: 2022-08-12 | Stop reason: HOSPADM

## 2022-08-11 RX ORDER — DOXYCYCLINE HYCLATE 100 MG
100 TABLET ORAL 2 TIMES DAILY
COMMUNITY
Start: 2022-02-18 | End: 2022-08-11

## 2022-08-11 RX ORDER — ATORVASTATIN CALCIUM 10 MG/1
10 TABLET, FILM COATED ORAL DAILY
Status: DISCONTINUED | OUTPATIENT
Start: 2022-08-11 | End: 2022-08-12 | Stop reason: HOSPADM

## 2022-08-11 RX ADMIN — GABAPENTIN 600 MG: 300 CAPSULE ORAL at 09:08

## 2022-08-11 RX ADMIN — DULOXETINE 20 MG: 20 CAPSULE, DELAYED RELEASE ORAL at 03:08

## 2022-08-11 RX ADMIN — LORAZEPAM 0.5 MG: 0.5 TABLET ORAL at 01:08

## 2022-08-11 RX ADMIN — CYANOCOBALAMIN TAB 1000 MCG 1000 MCG: 1000 TAB at 01:08

## 2022-08-11 RX ADMIN — ISOSORBIDE MONONITRATE 60 MG: 30 TABLET, EXTENDED RELEASE ORAL at 01:08

## 2022-08-11 RX ADMIN — RANOLAZINE 500 MG: 500 TABLET, FILM COATED, EXTENDED RELEASE ORAL at 09:08

## 2022-08-11 RX ADMIN — PANTOPRAZOLE SODIUM 40 MG: 40 TABLET, DELAYED RELEASE ORAL at 01:08

## 2022-08-11 RX ADMIN — ENOXAPARIN SODIUM 40 MG: 100 INJECTION SUBCUTANEOUS at 01:08

## 2022-08-11 RX ADMIN — CLOPIDOGREL 75 MG: 75 TABLET, FILM COATED ORAL at 01:08

## 2022-08-11 RX ADMIN — MICONAZOLE NITRATE: 20 POWDER TOPICAL at 09:08

## 2022-08-11 RX ADMIN — CARVEDILOL 6.25 MG: 6.25 TABLET, FILM COATED ORAL at 09:08

## 2022-08-11 RX ADMIN — FUROSEMIDE 40 MG: 40 TABLET ORAL at 01:08

## 2022-08-11 RX ADMIN — DILTIAZEM HYDROCHLORIDE 240 MG: 120 CAPSULE, COATED, EXTENDED RELEASE ORAL at 01:08

## 2022-08-11 RX ADMIN — GABAPENTIN 600 MG: 300 CAPSULE ORAL at 03:08

## 2022-08-11 RX ADMIN — NITROGLYCERIN 0.4 MG: 0.4 TABLET, ORALLY DISINTEGRATING SUBLINGUAL at 01:08

## 2022-08-11 RX ADMIN — ATORVASTATIN CALCIUM 10 MG: 10 TABLET, FILM COATED ORAL at 01:08

## 2022-08-11 RX ADMIN — CETIRIZINE HYDROCHLORIDE 10 MG: 10 TABLET, FILM COATED ORAL at 01:08

## 2022-08-11 NOTE — NURSING
Pt arrived to the unit via wheelchair with pt escort. Pt AAOx3, respirations even and unlabored. Pt oriented to room and unit. Bed in lowest position and locked.  at bedside. Pt denied CP or any other issues at this time. VSS, complete assessment to follow and be documented per EPIC flowsheet.

## 2022-08-11 NOTE — H&P
[facility] Medicine H&P Note     Admitting Team: [facility] Hospitalist Team B  Attending Physician: Dr. Corrales  Resident: Marin  Intern: Ulisses    Date of Admit: 8/11/2022    Chief Complaint     Chest Pain x1 day    Subjective:      History of Present Illness:  Pt is a 77 year-old female with a PMHx of mesenteric artery insufficiency s/p stent placement, TAVR (~5years ago), pacemaker placed in ~Aug 2021, A fib, CHF (EF 60-65%), HTN, HLD, DMT2, and a stroke in 2015.     Pt reports that she was in her usual state of health until 3:30am this morning when she got up and went to the bahroom. She reports feeling a sudden shaking/pain in her bilateral upper extremities. Ms. Strong thought this would self-resolve so she got back into bed . Chest pain started about 1 hr later.  She described the pain as a R-sided 10/10 stabbing pain that lasted for an unsure period of time. It migrated to the left side and then to both arms and jaw. Chest pain was not associated with any sweats or SOB. She took 2 doses of nitro at ~7:05 and 7:10 which only provided minimal relief; at which time she came to the hospital. Pt said that she has been experiencing L-sided chest tightness at rest for roughly 1 week. Pt also endoring intermittent loss of strength when having this poorly defined chest tightness.     Pt explained that she has not had to take Nitro for over a year.  Pt also explained that she has had a poor diet and has not been watching her sodium. Of note Ms. Strong has been following with Cardiology in [place] where she had an angiogram done.       Past Medical History:  Past Medical History:   Diagnosis Date    Acute on chronic diastolic (congestive) heart failure 11/20/2019    Anticoagulant long-term use     on Plavix since May 2015    Anxiety     Arthritis     Asthma     Atrial fibrillation     Atrial fibrillation with RVR 10/19/2020    Cataracts, bilateral     Chest pain, atypical     Chronic atrial fibrillation with rapid  ventricular response 6/23/2017    Colon polyp     Coronary artery disease     Diabetes mellitus     Dry eyes     Esophageal erosions     GERD (gastroesophageal reflux disease)     Heart murmur     Hemorrhoid     High cholesterol     Hypertension     Irritable bowel syndrome     Paroxysmal atrial fibrillation 10/30/2020    Persistent atrial fibrillation 2/10/2020    Shingles 2015    Stenosis of aortic and mitral valves     Stroke     TIA (transient ischemic attack)     Use of cane as ambulatory aid      Past Surgical History:  Past Surgical History:   Procedure Laterality Date    ABDOMINAL SURGERY      stents to SMA    angiocele      2007, 2014    AORTIC VALVE REPLACEMENT N/A 11/19/2019    Procedure: Replacement-valve-aortic;  Surgeon: Jack Capone MD;  Location: [facility] CATH LAB;  Service: Cardiology;  Laterality: N/A;    BREAST SURGERY      left--- a lump--- no cancer    CARDIAC VALVE SURGERY      COLONOSCOPY  2014    COLONOSCOPY N/A 3/14/2018    Procedure: COLONOSCOPY;  Surgeon: Efren Kemp MD;  Location: [facility] ENDO ([address] [place] [address]);  Service: Endoscopy;  Laterality: N/A;  ok to hold Plavix 5 days prior to procedure per Dr RESHMA Hernandez     ok per Dr Kemp to hold Eliquis 2 days prior to procedure (see telephone encounter dated-2/7/18)    HERNIA REPAIR      HYSTERECTOMY  partial    1982 partial hysterectomy    LEFT HEART CATHETERIZATION Right 11/5/2019    Procedure: Left heart cath;  Surgeon: Jack Capone MD;  Location: [facility] CATH LAB;  Service: Cardiology;  Laterality: Right;    left nasal polyp      left neck lymph node      nose polyp      right hip fatty mass tissue      stent to small intestine      SUPERIOR VENA CAVA ANGIOPLASTY / STENTING      TONSILLECTOMY      TOTAL ABDOMINAL HYSTERECTOMY      2014    TOTAL KNEE ARTHROPLASTY Bilateral     TREATMENT OF CARDIAC ARRHYTHMIA N/A 2/10/2020    Procedure: CARDIOVERSION;  Surgeon: Jayy Parrish MD;  Location: [facility] EP LAB;  Service:  Cardiology;  Laterality: N/A;  AF, PEDRO, DCCV, MAC, DM, 3 Prep    TREATMENT OF CARDIAC ARRHYTHMIA N/A 5/15/2020    Procedure: CARDIOVERSION;  Surgeon: Hany Bee MD;  Location: [facility] EP LAB;  Service: Cardiology;  Laterality: N/A;  AF, PEDRO, DCCV, MAC, DM, 3 Prep    UPPER GASTROINTESTINAL ENDOSCOPY  2014       Allergies:  Review of patient's allergies indicates:   Allergen Reactions    Celebrex [celecoxib] Shortness Of Breath    Ciprofloxacin Swelling     lip    Dicyclomine Hives    Adhesive Dermatitis    Avelox [moxifloxacin] Swelling    Cilostazol Other (See Comments)     Elevates blood pressure    Eryc [erythromycin] Other (See Comments)     unknown    Iodine and iodide containing products Hives    Keflex [cephalexin] Hives    Meclomen      rashes    Meloxicam      Ear ringing    Metoclopramide Other (See Comments)     High blood pressure    Sulfa (sulfonamide antibiotics) Itching       Home Medications:  Luba 300 Tid  Nystatin cream  Rinoluzine 500 BID  Lipitor 10  ELiquis 5 BID  Losaratan 25  Oxybutynin  Plavix  Diltiazam 240  Isosorbide dinitrate  Duloxetine  Metformin 500  Coreg 6.25  PPI 40  Symbicort  Furosemide 40mg    Family History:  Family History   Problem Relation Age of Onset    Heart attack Mother     Stroke Father     Heart failure Brother     Colon cancer Neg Hx     Inflammatory bowel disease Neg Hx    gabapentin    Social History:  Social History     Tobacco Use    Smoking status: Never Smoker    Smokeless tobacco: Never Used   Substance Use Topics    Alcohol use: No    Drug use: No     Never smoke  Rare EtOH  No illicits    Review of Systems:  All other systems are reviewed and are negative except as mentioned in HPI.    Health Maintaince :   Primary Care Physician: Krzysztof Shore    Immunizations:   TDap UTD    Flu UTD   Pna UTD    Cancer Screening:  PAP: UTD  MMG: UTD  Colonoscopy: Hx polyps; last colonoscopy in past 5 years     Objective:   Last 24 Hour Vital  Signs:  BP  Min: 141/61  Max: 180/77  Temp  Av.4 °F (36.9 °C)  Min: 98.4 °F (36.9 °C)  Max: 98.4 °F (36.9 °C)  Pulse  Av.7  Min: 69  Max: 86  Resp  Av  Min: 20  Max: 20  SpO2  Av.3 %  Min: 95 %  Max: 96 %  Weight  Av.4 kg (250 lb)  Min: 113.4 kg (250 lb)  Max: 113.4 kg (250 lb)  Body mass index is 42.91 kg/m².  No intake/output data recorded.    Physical Examination:  General appearance: alert, appears stated age and cooperative  Head: Normocephalic, without obvious abnormality, atraumatic  Back: symmetric, no curvature. ROM normal. No CVA tenderness.  Lungs: wheezes bilaterally and decreased breathe sounds in all lung feilds  Heart: irregularly irregular rhythm and irregular rate  Abdomen: soft, non-tender; bowel sounds normal; no masses,  no organomegaly  Extremities: edema present in bilatral feet extending to the mid-shin and no ulcers, gangrene or trophic changes  Pulses: 2+ and symmetric  Neurologic: Grossly normal      Laboratory:  Most Recent Data:  CBC:   Lab Results   Component Value Date    WBC 8.38 2022    HGB 13.2 2022    HCT 39.7 2022     2022    MCV 93 2022    RDW 13.7 2022     WBC Differential:   BMP:   Lab Results   Component Value Date     2022    K 4.3 2022    CL 95 2022    CO2 27 2022    BUN 19 2022    CREATININE 0.8 2022     (H) 2022    CALCIUM 9.9 2022    MG 1.6 11/10/2020    PHOS 2.9 2017     LFTs:   Lab Results   Component Value Date    PROT 7.7 2022    ALBUMIN 4.1 2022    BILITOT 0.5 2022    AST 17 2022    ALKPHOS 44 (L) 2022    ALT 21 2022     Coags:   Lab Results   Component Value Date    INR 1.0 10/19/2020     FLP:   Lab Results   Component Value Date    CHOL 143 2021    HDL 56 2021    LDLCALC 69.4 2021    TRIG 88 2021    CHOLHDL 39.2 2021     DM:   Lab Results   Component Value Date     HGBA1C 5.9 (H) 10/20/2020    HGBA1C 6.5 (H) 11/20/2019    HGBA1C 7.0 (H) 02/19/2019    LDLCALC 69.4 03/16/2021    CREATININE 0.8 08/11/2022     Thyroid:   Lab Results   Component Value Date    TSH 3.930 03/16/2021    FREET4 0.96 08/25/2020     Anemia:   Lab Results   Component Value Date    IRON 42 (L) 08/24/2016    TIBC 462 (H) 08/24/2016    FERRITIN 16 (L) 07/01/2016    GBBLHEGI44 846 02/19/2019    FOLATE >24.0 08/24/2016     Cardiac:   Lab Results   Component Value Date    TROPONINI <0.006 08/11/2022     (H) 08/17/2021     Urinalysis:   Lab Results   Component Value Date    LABURIN KLEBSIELLA PNEUMONIAE  >100,000 cfu/ml   (A) 10/19/2020    COLORU Yellow 12/04/2021    SPECGRAV 1.015 12/04/2021    NITRITE Negative 12/04/2021    KETONESU Negative 12/04/2021    UROBILINOGEN Negative 12/04/2021    WBCUA 2 08/17/2021       Trended Lab Data:  Recent Labs   Lab 08/11/22  0901 08/11/22  1023   WBC 8.38  --    HGB 13.2  --    HCT 39.7  --      --    MCV 93  --    RDW 13.7  --    NA  --  138   K  --  4.3   CL  --  95   CO2  --  27   BUN  --  19   CREATININE  --  0.8   GLU  --  140*   PROT  --  7.7   ALBUMIN  --  4.1   BILITOT  --  0.5   AST  --  17   ALKPHOS  --  44*   ALT  --  21       Trended Cardiac Data:  Recent Labs   Lab 08/11/22 0901   TROPONINI <0.006       Microbiology Data:  N/A    Other Results:  EKG (my interpretation): Irregularly-irregular rhythm at a rate of 70    Radiology:  Imaging Results          X-Ray Chest 1 View (Final result)  Result time 08/11/22 09:03:34    Final result by Chencho Linares MD (08/11/22 09:03:34)                 Impression:      No acute radiographic findings in the chest.      Electronically signed by: Chencho Linares MD  Date:    08/11/2022  Time:    09:03             Narrative:    EXAMINATION:  XR CHEST 1 VIEW    CLINICAL HISTORY:  Chest pain;    TECHNIQUE:  Single frontal view of the chest was performed.    COMPARISON:  12/04/2021    FINDINGS:  There are low lung  volumes with elevation of the right hemidiaphragm.  Cardiac silhouette is enlarged but stable in size.  There are aortic calcifications with a percutaneous aortic valve.  There is a left-sided pacemaker.  No significant consolidation, large effusion or pneumothorax.  Bones show degenerative changes..                              Assessment:     Adriana Strong is a 77 y.o. female with mesenteric artery insufficiency s/p stent placement, TAVR (~5years ago), pacemaker placed in ~Aug 2021, A fib, CHF (EF 60-65%), HTN, HLD, and DMT2 who presented to the ED today with complaints of CP x1 day.  Pt's pain was unrelieved by nitroglycering or rest.  In the ED pt initial troponin was negative and ECG showed no sign of acute ischemia. At this point medicine was consulted for further care.     Plan:     #Chest Pain possibly 2/2 to CAD  -pt with complaints of chest pain that had resolved during interview  -troponin neg x2; BNP: 269  Plan:  -cardiology consulted  -continue Plavix, Fenofibrae, statin, ARB, BB, Ranexa at this time  -trend troponin overnight  -hold eliquis and cover pt with 1 dose of lovenox 1mg/kg   -NPO at midnight for possible cardiac procedure tomorrow    #A. fib  -confirmed via ECG, has pacemaker inplace  -Continue home COREG at this time    #Aortic Stenosis  -s/p TAVR    #Essential Hypertension  -BP elevated; 145/72  -Resume home carvedilol 6.25, losartan, isosorbide mononitrate 60mg, and diltiazem 240 mg   -will up-titrate for blood pressures systolic >180    #HLD  -repeat lipid panel pending  -continue home atorvastatin 10 mg     #DMT2  -HbA1C pending; glucose on admit 140  -continue home Metformin 500 mg daily      Code Status:     Code: Full  Diet: NPO    Vladimir Gtz MD  [facility] Internal Medicine HO-1    [facility] Medicine Hospitalist Pager numbers:   [facility] Hospitalist Medicine Team A (Rajinder/Isis): [phone]  [facility] Hospitalist Medicine Team B (Antoni/Carleen):  [phone]

## 2022-08-11 NOTE — HOSPITAL COURSE
08/11/2022 TTE pending   08/12/2022 TTE  The left ventricle is normal in size with normal systolic function.  The estimated ejection fraction is 55%.  Grade III left ventricular diastolic dysfunction.  Normal right ventricular size with normal right ventricular systolic function.  There is abnormal septal wall motion consistent with right ventricular pacemaker.  Moderate left atrial enlargement.  There is a 26 mm S3 transcutaneously-placed aortic bioprosthesis present. There is no aortic insufficiency present. Prosthetic aortic valve is normal.  The aortic valve mean gradient is 8 mmHg with a dimensionless index of 0.68.  The estimated PA systolic pressure is 53 mmHg.  Normal central venous pressure (3 mmHg).  There is pulmonary hypertension.    Troponin remains negative. Antianginals up titrated. No chest pain reported overnight

## 2022-08-11 NOTE — ASSESSMENT & PLAN NOTE
11/19/20    Echo Color Flow Doppler? Yes    Interpretation Summary  · The left ventricle is normal in size with normal systolic function. The estimated ejection fraction is 60%.  · There is a 26 mm S3 transcutaneously-placed aortic bioprosthesis present. There is no aortic aortic insufficiency present at all.  · There is left ventricular concentric remodeling.  · Severe left atrial enlargement.  · Diastolic dysfunction.  · Normal right ventricular size with normal right ventricular systolic function.  · Mild tricuspid regurgitation.  · The estimated PA systolic pressure is 29 mmHg.  · Normal central venous pressure (3 mmHg).    . Continue Beta Blocker, ACE/ARB and Nitrate/Vasodilator and monitor clinical status closely. Monitor on telemetry. Patient is on CHF pathway.  Monitor strict Is&Os and daily weights.  Place on fluid restriction of 2 L. Continue to stress to patient importance of self efficacy and  on diet for CHF.   Continue p.o. Lasix

## 2022-08-11 NOTE — SUBJECTIVE & OBJECTIVE
Past Medical History:   Diagnosis Date    Acute on chronic diastolic (congestive) heart failure 11/20/2019    Anticoagulant long-term use     on Plavix since May 2015    Anxiety     Arthritis     Asthma     Atrial fibrillation     Atrial fibrillation with RVR 10/19/2020    Cataracts, bilateral     Chest pain, atypical     Chronic atrial fibrillation with rapid ventricular response 6/23/2017    Colon polyp     Coronary artery disease     Diabetes mellitus     Dry eyes     Esophageal erosions     GERD (gastroesophageal reflux disease)     Heart murmur     Hemorrhoid     High cholesterol     Hypertension     Irritable bowel syndrome     Paroxysmal atrial fibrillation 10/30/2020    Persistent atrial fibrillation 2/10/2020    Shingles 2015    Stenosis of aortic and mitral valves     Stroke     TIA (transient ischemic attack)     Use of cane as ambulatory aid        Past Surgical History:   Procedure Laterality Date    ABDOMINAL SURGERY      stents to SMA    angiocele      2007, 2014    AORTIC VALVE REPLACEMENT N/A 11/19/2019    Procedure: Replacement-valve-aortic;  Surgeon: Jack Capone MD;  Location: [facility] CATH LAB;  Service: Cardiology;  Laterality: N/A;    BREAST SURGERY      left--- a lump--- no cancer    CARDIAC VALVE SURGERY      COLONOSCOPY  2014    COLONOSCOPY N/A 3/14/2018    Procedure: COLONOSCOPY;  Surgeon: Efren Kemp MD;  Location: [facility] ENDO ([address] [place] [address]);  Service: Endoscopy;  Laterality: N/A;  ok to hold Plavix 5 days prior to procedure per Dr RESHMA Hernandez     ok per Dr Kemp to hold Eliquis 2 days prior to procedure (see telephone encounter dated-2/7/18)    HERNIA REPAIR      HYSTERECTOMY  partial    1982 partial hysterectomy    LEFT HEART CATHETERIZATION Right 11/5/2019    Procedure: Left heart cath;  Surgeon: Jack Capone MD;  Location: [facility] CATH LAB;  Service: Cardiology;  Laterality: Right;    left nasal polyp      left neck lymph node      nose polyp      right hip fatty mass tissue      stent to small  intestine      SUPERIOR VENA CAVA ANGIOPLASTY / STENTING      TONSILLECTOMY      TOTAL ABDOMINAL HYSTERECTOMY      2014    TOTAL KNEE ARTHROPLASTY Bilateral     TREATMENT OF CARDIAC ARRHYTHMIA N/A 2/10/2020    Procedure: CARDIOVERSION;  Surgeon: Jayy Parrish MD;  Location: [facility] EP LAB;  Service: Cardiology;  Laterality: N/A;  AF, PEDRO, DCCV, MAC, DM, 3 Prep    TREATMENT OF CARDIAC ARRHYTHMIA N/A 5/15/2020    Procedure: CARDIOVERSION;  Surgeon: Hany Bee MD;  Location: [facility] EP LAB;  Service: Cardiology;  Laterality: N/A;  AF, PEDRO, DCCV, MAC, DM, 3 Prep    UPPER GASTROINTESTINAL ENDOSCOPY  2014       Review of patient's allergies indicates:   Allergen Reactions    Celebrex [celecoxib] Shortness Of Breath    Ciprofloxacin Swelling     lip    Dicyclomine Hives    Adhesive Dermatitis    Avelox [moxifloxacin] Swelling    Cilostazol Other (See Comments)     Elevates blood pressure    Eryc [erythromycin] Other (See Comments)     unknown    Iodine and iodide containing products Hives    Keflex [cephalexin] Hives    Meclomen      rashes    Meloxicam      Ear ringing    Metoclopramide Other (See Comments)     High blood pressure    Sulfa (sulfonamide antibiotics) Itching       Current Facility-Administered Medications on File Prior to Encounter   Medication    0.9%  NaCl infusion    sodium chloride 0.9% flush 5 mL     Current Outpatient Medications on File Prior to Encounter   Medication Sig    acetaminophen (TYLENOL) 650 MG TbSR Take 1,300 mg by mouth 2 (two) times daily.    ascorbic acid, vitamin C, (VITAMIN C) 500 MG tablet Take 1,000 mg by mouth once daily.     atorvastatin (LIPITOR) 10 MG tablet Take 10 mg by mouth once daily.    bisacodyl (DULCOLAX) 5 mg EC tablet Take 5 mg by mouth daily as needed for Constipation.    calcium carbonate/vitamin D3 (CALTRATE 600 + D ORAL) Take 1 tablet by mouth once daily.    carvediloL (COREG) 6.25 MG tablet Take 6.25 mg by mouth 2 (two) times daily.    clopidogreL (PLAVIX) 75  mg tablet Take 75 mg by mouth once daily.    cyanocobalamin (VITAMIN B-12) 1000 MCG tablet Take 1,000 mcg by mouth once daily.    diltiaZEM (CARDIZEM CD) 240 MG 24 hr capsule Take 240 mg by mouth once daily.    DULoxetine (CYMBALTA) 20 MG capsule TAKE 1 CAPSULE BY MOUTH EVERY DAY (Patient taking differently: Take 20 mg by mouth once daily.)    ELIQUIS 5 mg Tab TAKE 1 TABLET BY MOUTH TWICE A DAY (Patient taking differently: Take 5 mg by mouth 2 (two) times daily.)    fexofenadine (ALLEGRA) 60 MG tablet Take 180 mg by mouth every morning.     fish oil-omega-3 fatty acids 300-1,000 mg capsule Take 1 g by mouth once daily.     fluticasone (FLONASE) 50 mcg/actuation nasal spray 1 spray by Each Nare route every morning.     furosemide (LASIX) 40 MG tablet TAKE 1 TABLET BY MOUTH EVERY DAY (Patient taking differently: Take 40 mg by mouth 2 (two) times a day.)    gabapentin (NEURONTIN) 300 MG capsule Take 2 capsules (600 mg total) by mouth 3 (three) times daily.    isosorbide mononitrate (IMDUR) 60 MG 24 hr tablet Take 60 mg by mouth once daily.    levalbuterol (XOPENEX) 1.25 mg/3 mL nebulizer solution SMARTSI Vial(s) Via Nebulizer    lorazepam (ATIVAN) 0.5 MG tablet Take 0.5 mg by mouth every morning.     losartan (COZAAR) 25 MG tablet Take 25 mg by mouth once daily.    metFORMIN (GLUCOPHAGE) 500 MG tablet Take 500 mg by mouth 2 (two) times daily.    montelukast (SINGULAIR) 10 mg tablet Take 10 mg by mouth once daily.    mv-mn/folic ac/calcium/vit K1 (WOMEN'S 50 PLUS MULTIVITAMIN ORAL) Take 1 tablet by mouth once daily.    nitroGLYCERIN (NITROSTAT) 0.4 MG SL tablet Place 0.4 mg under the tongue every 5 (five) minutes as needed for Chest pain.    nystatin (MYCOSTATIN) powder Apply topically every 8 (eight) hours.    ondansetron (ZOFRAN-ODT) 4 MG TbDL Take 4 mg by mouth every 8 (eight) hours as needed (nausea).    oxybutynin (DITROPAN-XL) 5 MG TR24 Take 5 mg by mouth every other day.     pantoprazole (PROTONIX) 40 MG  tablet TAKE 1 TABLET BY MOUTH EVERY DAY (Patient taking differently: Take 40 mg by mouth once daily.)    POLYSORBATE 80/GLYCERIN (REFRESH DRY EYE THERAPY OPHT) Apply to eye 2 (two) times daily.    potassium gluconate 550 mg (90 mg) Tab Take 180 mg by mouth 2 (two) times a day.    predniSONE (DELTASONE) 20 MG tablet Take 40 mg by mouth once daily.    ranolazine (RANEXA) 500 MG Tb12 Take 500 mg by mouth 2 (two) times daily.    SYMBICORT 160-4.5 mcg/actuation HFAA Inhale 1 puff into the lungs 2 (two) times daily.    vitamin D (VITAMIN D3) 1000 units Tab Take 2,000 Units by mouth once daily.    albuterol (PROVENTIL) 2.5 mg /3 mL (0.083 %) nebulizer solution USE 1 VIAL IN NEBULIZER EVERY 4 6 HOURS AS NEEDED    albuterol-ipratropium (DUO-NEB) 2.5 mg-0.5 mg/3 mL nebulizer solution NEBULIZE 1 VIAL EVERY 4 HOURS AS NEEDED    mupirocin (BACTROBAN) 2 % ointment Apply topically 2 (two) times daily.    [DISCONTINUED] aluminum & magnesium hydroxide-simethicone (MYLANTA MAX STRENGTH) 400-400-40 mg/5 mL suspension Take by mouth every 6 (six) hours as needed for Indigestion.    [DISCONTINUED] amoxicillin (AMOXIL) 500 MG capsule SMARTSI Capsule(s) By Mouth    [DISCONTINUED] atorvastatin (LIPITOR) 10 MG tablet Take 1 tablet (10 mg total) by mouth once daily.    [DISCONTINUED] azelastine (ASTELIN) 137 mcg (0.1 %) nasal spray 1 spray (137 mcg total) by Nasal route 2 (two) times daily.    [DISCONTINUED] clopidogreL (PLAVIX) 75 mg tablet Take 1 tablet (75 mg total) by mouth once daily.    [DISCONTINUED] diltiaZEM (CARDIZEM LA) 360 mg 24 hr tablet Take 1 tablet (360 mg total) by mouth once daily.    [DISCONTINUED] diphenhydrAMINE (BENADRYL) 50 MG capsule Take 1 capsule (50 mg total) by mouth every 6 (six) hours as needed for Itching. (Patient not taking: Reported on 2021)    [DISCONTINUED] doxycycline (VIBRA-TABS) 100 MG tablet Take 100 mg by mouth 2 (two) times daily.    [DISCONTINUED] folic acid/multivit-min/lutein (CENTRUM  SILVER ORAL) Take by mouth.    [DISCONTINUED] isosorbide mononitrate (IMDUR) 30 MG 24 hr tablet TAKE 1 TABLET BY MOUTH EVERY DAY    [DISCONTINUED] lisinopriL (PRINIVIL,ZESTRIL) 20 MG tablet Take 2 tablets (40 mg total) by mouth once daily.    [DISCONTINUED] nitroGLYCERIN (NITROSTAT) 0.4 MG SL tablet Place 1 tablet (0.4 mg total) under the tongue every 5 (five) minutes as needed for Chest pain.    [DISCONTINUED] polyethylene glycol (GLYCOLAX) 17 gram PwPk Take by mouth.     Family History       Problem Relation (Age of Onset)    Heart attack Mother    Heart failure Brother    Stroke Father          Tobacco Use    Smoking status: Never Smoker    Smokeless tobacco: Never Used   Substance and Sexual Activity    Alcohol use: No    Drug use: No    Sexual activity: Not Currently     Review of Systems   Constitutional: Negative. Negative for diaphoresis.   HENT: Negative.     Eyes: Negative.    Cardiovascular:  Positive for chest pain, dyspnea on exertion and leg swelling. Negative for near-syncope, orthopnea, palpitations, paroxysmal nocturnal dyspnea and syncope.   Respiratory: Negative.  Negative for shortness of breath.    Endocrine: Negative.    Hematologic/Lymphatic: Negative.    Skin: Negative.    Musculoskeletal:  Positive for arthritis.   Gastrointestinal:  Negative for nausea and vomiting.   Genitourinary: Negative.    Neurological:  Positive for dizziness.   Psychiatric/Behavioral:  Positive for memory loss.    Allergic/Immunologic: Negative.    Objective:     Vital Signs (Most Recent):  Temp: 98.4 °F (36.9 °C) (08/11/22 0833)  Pulse: 69 (08/11/22 1104)  Resp: 20 (08/11/22 1104)  BP: (!) 141/61 (08/11/22 1104)  SpO2: 95 % (08/11/22 1104)   Vital Signs (24h Range):  Temp:  [98.4 °F (36.9 °C)] 98.4 °F (36.9 °C)  Pulse:  [69-86] 69  Resp:  [20] 20  SpO2:  [95 %-96 %] 95 %  BP: (141-180)/(61-82) 141/61     Weight: 113.4 kg (250 lb)  Body mass index is 42.91 kg/m².    SpO2: 95 %       No intake or output data in the  24 hours ending 08/11/22 1232    Lines/Drains/Airways       Peripheral Intravenous Line  Duration                  Peripheral IV - Single Lumen 08/11/22 0857 20 G Left Forearm <1 day                    Physical Exam  Constitutional:       General: She is not in acute distress.     Appearance: She is obese. She is not diaphoretic.   HENT:      Head: Atraumatic.   Eyes:      General:         Right eye: No discharge.         Left eye: No discharge.   Cardiovascular:      Rate and Rhythm: Normal rate and regular rhythm.      Heart sounds: Murmur heard.   Pulmonary:      Effort: Pulmonary effort is normal.      Breath sounds: No rales.   Abdominal:      General: Bowel sounds are normal.      Palpations: Abdomen is soft.   Musculoskeletal:      Right lower leg: Edema present.      Left lower leg: Edema present.   Skin:     General: Skin is warm and dry.      Capillary Refill: Capillary refill takes 2 to 3 seconds.   Neurological:      Mental Status: She is alert. Mental status is at baseline.       Significant Labs: BMP:   Recent Labs   Lab 08/11/22  1023   *      K 4.3   CL 95   CO2 27   BUN 19   CREATININE 0.8   CALCIUM 9.9   , CMP   Recent Labs   Lab 08/11/22  1023      K 4.3   CL 95   CO2 27   *   BUN 19   CREATININE 0.8   CALCIUM 9.9   PROT 7.7   ALBUMIN 4.1   BILITOT 0.5   ALKPHOS 44*   AST 17   ALT 21   ANIONGAP 16   , CBC   Recent Labs   Lab 08/11/22  0901   WBC 8.38   HGB 13.2   HCT 39.7      , INR No results for input(s): INR, PROTIME in the last 48 hours., Lipid Panel No results for input(s): CHOL, HDL, LDLCALC, TRIG, CHOLHDL in the last 48 hours., Troponin   Recent Labs   Lab 08/11/22  0901   TROPONINI <0.006   , and All pertinent lab results from the last 24 hours have been reviewed.    Significant Imaging: Echocardiogram: Transthoracic echo (TTE) complete (Cupid Only):   Results for orders placed or performed during the hospital encounter of 11/19/20   Echo Color Flow  Doppler? Yes   Result Value Ref Range    BSA 2.19 m2    TDI SEPTAL 0.07 m/s    LV LATERAL E/E' RATIO 10.89 m/s    LV SEPTAL E/E' RATIO 14.00 m/s    LA WIDTH 4.95 cm    TDI LATERAL 0.09 m/s    LVIDd 4.43 3.5 - 6.0 cm    IVS 1.07 0.6 - 1.1 cm    Posterior Wall 1.08 0.6 - 1.1 cm    LVIDs 2.96 2.1 - 4.0 cm    FS 33 28 - 44 %    LA volume 117.41 cm3    Sinus 3.46 cm    STJ 2.85 cm    Ascending aorta 3.21 cm    LV mass 165.30 g    LA size 4.44 cm    RVDD 3.14 cm    TAPSE 1.75 cm    Left Ventricle Relative Wall Thickness 0.49 cm    AV mean gradient 10 mmHg    AV valve area 1.44 cm2    AV Velocity Ratio 0.37     AV index (prosthetic) 0.39     Mean e' 0.08 m/s    IVRT 76.12 msec    Pulm vein S/D ratio 0.34     LVOT diameter 2.17 cm    LVOT area 3.7 cm2    LVOT peak mazin 0.82 m/s    LVOT peak VTI 18.27 cm    Ao peak mazin 2.24 m/s    Ao VTI 46.88 cm    LVOT stroke volume 67.53 cm3    AV peak gradient 20 mmHg    E/E' ratio 12.25 m/s    MV Peak E Mazin 0.98 m/s    TR Max Mazin 2.53 m/s    PV Peak S Mazin 0.25 m/s    PV Peak D Mazin 0.74 m/s    LV Systolic Volume 33.87 mL    LV Systolic Volume Index 16.2 mL/m2    LV Diastolic Volume 89.01 mL    LV Diastolic Volume Index 42.57 mL/m2    LA Volume Index 56.1 mL/m2    LV Mass Index 79 g/m2    RA Major Axis 5.05 cm    Left Atrium Minor Axis 6.25 cm    Left Atrium Major Axis 6.32 cm    Triscuspid Valve Regurgitation Peak Gradient 26 mmHg    RA Width 3.68 cm    Right Atrial Pressure (from IVC) 3 mmHg    TV rest pulmonary artery pressure 29 mmHg    Narrative    · The left ventricle is normal in size with normal systolic function. The   estimated ejection fraction is 60%.  · There is a 26 mm S3 transcutaneously-placed aortic bioprosthesis   present. There is no aortic aortic insufficiency present at all.  · There is left ventricular concentric remodeling.  · Severe left atrial enlargement.  · Diastolic dysfunction.  · Normal right ventricular size with normal right ventricular systolic    function.  · Mild tricuspid regurgitation.  · The estimated PA systolic pressure is 29 mmHg.  · Normal central venous pressure (3 mmHg).

## 2022-08-11 NOTE — PLAN OF CARE
Problem: Adult Inpatient Plan of Care  Goal: Plan of Care Review  8/11/2022 1513 by Ramiro Sarkar RN  Outcome: Ongoing, Progressing  8/11/2022 1446 by Ramiro Sarkar RN  Outcome: Ongoing, Progressing

## 2022-08-11 NOTE — AI DETERIORATION ALERT
Artificial Intelligence Notification  Tristen      Admit Date: 2022  LOS: 0  Code Status: Full Code   Date of Consult: 2022  : 1945  Age: 77 y.o.  Weight:   Wt Readings from Last 1 Encounters:   22 113.4 kg (250 lb)     Sex: female  Bed: [facility] A:   MRN: [id number]  Attending Physician: Jarrod Corrales MD  Primary Service: Networked reference to record PCT   Time AI Alert Received: ***  Time at Bedside: ***           ***      Vital Signs (Most Recent):  Temp: 98.4 °F (36.9 °C) (22 0833)  Pulse: 70 (22 1333)  Resp: (!) 21 (22 1324)  BP: 139/65 (22 1333)  SpO2: 95 % (22 1324) Vital Signs (24h Range):  Temp:  [98.4 °F (36.9 °C)] 98.4 °F (36.9 °C)  Pulse:  [69-86] 70  Resp:  [20-21] 21  SpO2:  [95 %-96 %] 95 %  BP: (135-180)/(60-82) 139/65         This encounter was triggered by an Artificial Intelligence Notification.     Artificial Intelligence alert discussed with Primary team:  Name ***      Evaluation: ***    Disposition: ***

## 2022-08-11 NOTE — HPI
Adriana Strong is a 77 year old female with CAD, HLD, HTN. AS s/p TAVR, DM, CVA, CHF, AF and PPM. Patient presented to the ED with chest pain that woke her from sleep at 0330 this AM. Pain was left sided and described as a pressure that migrated to the right side and to her back. Patient took NTG x 2 without relief and proceeded to the ED for evaluation. Patient is currently CP free. Right paraspinal TTP. She reports periods of chronic dizziness that have been stable without increasing frequency recently. Mild BLE edema reported at baseline. Patient also reports stable WHITLEY. She denies SOB, diaphoresis, palpitations, syncope, NV. Patient reports LHC in [place] in October/November last year without intervention. She takes Eliquis for AF.  EKG paced   Initial troponin negative     C 2019 prior to TAVR:  1.  The LVH made coronary angiography very difficult.  A 10 cc was required for coronary angiograms.  2.  The left main was free of disease.  It was very short making cannulation of the LAD difficulty.  3.  The LAD had a long tubular stenosis of 70% at the diagonal bifurcation.  The diagonal was a larger vessel than the LAD making intervention on the LAD suboptimal.  4.  The left circumflex was normal.  5.  The right coronary artery was normal.    TTE 2020  The left ventricle is normal in size with normal systolic function. The estimated ejection fraction is 60%.  There is a 26 mm S3 transcutaneously-placed aortic bioprosthesis present. There is no aortic aortic insufficiency present at all.  There is left ventricular concentric remodeling.  Severe left atrial enlargement.  Diastolic dysfunction.  Normal right ventricular size with normal right ventricular systolic function.  Mild tricuspid regurgitation.  The estimated PA systolic pressure is 29 mmHg.  Normal central venous pressure (3 mmHg).

## 2022-08-11 NOTE — ED NOTES
Spoke with admit team regarding increase in chest pain. Informed MD of increasing chest pain that was slightly relieved by nitro. Repeat EKG placed in chart.

## 2022-08-11 NOTE — PROGRESS NOTES
08/11/22 1452   Patient Belongings at Bedside   Belongings at Bedside Other valuables;Vision;Clothing   Vision - Corrective Lenses Glasses   Clothing Shirt;Pants;Footwear;Underpants;Bra   Other Valuables Cane;Cell phone   Patient Belongings Sent Home   Belongings Sent Home Jewelry   Jewelry Ring  (wedding ring)   Sent Home With

## 2022-08-11 NOTE — CONSULTS
[place] - Emergency Dept  Cardiology  Consult Note    Patient Name: Adriana Strong  MRN: [id number]  Admission Date: 8/11/2022  Hospital Length of Stay: 0 days  Code Status: Prior   Attending Provider: Davian Carpio, *   Consulting Provider: Kris Avilez NP  Primary Care Physician: Raquel Farrell MD  Principal Problem:<principal problem not specified>    Patient information was obtained from patient, past medical records and ER records.     Inpatient consult to Cardiology  Consult performed by: Kris Avilez NP  Consult ordered by: Davian Carpio MD        Subjective:     Chief Complaint:  Chest pain      HPI:   Adriana Strong is a 77 year old female with CAD, HLD, HTN. AS s/p TAVR, DM, CVA, CHF, AF and PPM. Patient presented to the ED with chest pain that woke her from sleep at 0330 this AM. Pain was left sided and described as a pressure that migrated to the right side and to her back. Patient took NTG x 2 without relief and proceeded to the ED for evaluation. Patient is currently CP free. Right paraspinal TTP. She reports periods of chronic dizziness that have been stable without increasing frequency recently. Mild BLE edema reported at baseline. Patient also reports stable WHITLEY. She denies SOB, diaphoresis, palpitations, syncope, NV. Patient reports LHC in [place] in October/November last year without intervention. She takes Eliquis for AF.  EKG paced   Initial troponin negative     [facility] 2019 prior to TAVR:  1.  The LVH made coronary angiography very difficult.  A 10 cc was required for coronary angiograms.  2.  The left main was free of disease.  It was very short making cannulation of the LAD difficulty.  3.  The LAD had a long tubular stenosis of 70% at the diagonal bifurcation.  The diagonal was a larger vessel than the LAD making intervention on the LAD suboptimal.  4.  The left circumflex was normal.  5.  The right coronary artery was normal.    TTE 2020  · The left ventricle  is normal in size with normal systolic function. The estimated ejection fraction is 60%.  · There is a 26 mm S3 transcutaneously-placed aortic bioprosthesis present. There is no aortic aortic insufficiency present at all.  · There is left ventricular concentric remodeling.  · Severe left atrial enlargement.  · Diastolic dysfunction.  · Normal right ventricular size with normal right ventricular systolic function.  · Mild tricuspid regurgitation.  · The estimated PA systolic pressure is 29 mmHg.  · Normal central venous pressure (3 mmHg).           Past Medical History:   Diagnosis Date    Acute on chronic diastolic (congestive) heart failure 11/20/2019    Anticoagulant long-term use     on Plavix since May 2015    Anxiety     Arthritis     Asthma     Atrial fibrillation     Atrial fibrillation with RVR 10/19/2020    Cataracts, bilateral     Chest pain, atypical     Chronic atrial fibrillation with rapid ventricular response 6/23/2017    Colon polyp     Coronary artery disease     Diabetes mellitus     Dry eyes     Esophageal erosions     GERD (gastroesophageal reflux disease)     Heart murmur     Hemorrhoid     High cholesterol     Hypertension     Irritable bowel syndrome     Paroxysmal atrial fibrillation 10/30/2020    Persistent atrial fibrillation 2/10/2020    Shingles 2015    Stenosis of aortic and mitral valves     Stroke     TIA (transient ischemic attack)     Use of cane as ambulatory aid        Past Surgical History:   Procedure Laterality Date    ABDOMINAL SURGERY      stents to SMA    angiocele      2007, 2014    AORTIC VALVE REPLACEMENT N/A 11/19/2019    Procedure: Replacement-valve-aortic;  Surgeon: Jack Capone MD;  Location: [facility] CATH LAB;  Service: Cardiology;  Laterality: N/A;    BREAST SURGERY      left--- a lump--- no cancer    CARDIAC VALVE SURGERY      COLONOSCOPY  2014    COLONOSCOPY N/A 3/14/2018    Procedure: COLONOSCOPY;  Surgeon: Efren Kemp MD;   Location: [facility] ENDO (4TH FLR);  Service: Endoscopy;  Laterality: N/A;  ok to hold Plavix 5 days prior to procedure per Dr RESHMA Hernandez     ok per Dr Kemp to hold Eliquis 2 days prior to procedure (see telephone encounter dated-2/7/18)    HERNIA REPAIR      HYSTERECTOMY  partial    1982 partial hysterectomy    LEFT HEART CATHETERIZATION Right 11/5/2019    Procedure: Left heart cath;  Surgeon: Jack Capone MD;  Location: [facility] CATH LAB;  Service: Cardiology;  Laterality: Right;    left nasal polyp      left neck lymph node      nose polyp      right hip fatty mass tissue      stent to small intestine      SUPERIOR VENA CAVA ANGIOPLASTY / STENTING      TONSILLECTOMY      TOTAL ABDOMINAL HYSTERECTOMY      2014    TOTAL KNEE ARTHROPLASTY Bilateral     TREATMENT OF CARDIAC ARRHYTHMIA N/A 2/10/2020    Procedure: CARDIOVERSION;  Surgeon: Jayy Parrish MD;  Location: [facility] EP LAB;  Service: Cardiology;  Laterality: N/A;  AF, PEDRO, DCCV, MAC, DM, 3 Prep    TREATMENT OF CARDIAC ARRHYTHMIA N/A 5/15/2020    Procedure: CARDIOVERSION;  Surgeon: Hany Bee MD;  Location: [facility] EP LAB;  Service: Cardiology;  Laterality: N/A;  AF, PEDRO, DCCV, MAC, DM, 3 Prep    UPPER GASTROINTESTINAL ENDOSCOPY  2014       Review of patient's allergies indicates:   Allergen Reactions    Celebrex [celecoxib] Shortness Of Breath    Ciprofloxacin Swelling     lip    Dicyclomine Hives    Adhesive Dermatitis    Avelox [moxifloxacin] Swelling    Cilostazol Other (See Comments)     Elevates blood pressure    Eryc [erythromycin] Other (See Comments)     unknown    Iodine and iodide containing products Hives    Keflex [cephalexin] Hives    Meclomen      rashes    Meloxicam      Ear ringing    Metoclopramide Other (See Comments)     High blood pressure    Sulfa (sulfonamide antibiotics) Itching       Current Facility-Administered Medications on File Prior to Encounter   Medication    0.9%  NaCl infusion    sodium chloride 0.9%  flush 5 mL     Current Outpatient Medications on File Prior to Encounter   Medication Sig    acetaminophen (TYLENOL) 650 MG TbSR Take 1,300 mg by mouth 2 (two) times daily.    ascorbic acid, vitamin C, (VITAMIN C) 500 MG tablet Take 1,000 mg by mouth once daily.     atorvastatin (LIPITOR) 10 MG tablet Take 10 mg by mouth once daily.    bisacodyl (DULCOLAX) 5 mg EC tablet Take 5 mg by mouth daily as needed for Constipation.    calcium carbonate/vitamin D3 (CALTRATE 600 + D ORAL) Take 1 tablet by mouth once daily.    carvediloL (COREG) 6.25 MG tablet Take 6.25 mg by mouth 2 (two) times daily.    clopidogreL (PLAVIX) 75 mg tablet Take 75 mg by mouth once daily.    cyanocobalamin (VITAMIN B-12) 1000 MCG tablet Take 1,000 mcg by mouth once daily.    diltiaZEM (CARDIZEM CD) 240 MG 24 hr capsule Take 240 mg by mouth once daily.    DULoxetine (CYMBALTA) 20 MG capsule TAKE 1 CAPSULE BY MOUTH EVERY DAY (Patient taking differently: Take 20 mg by mouth once daily.)    ELIQUIS 5 mg Tab TAKE 1 TABLET BY MOUTH TWICE A DAY (Patient taking differently: Take 5 mg by mouth 2 (two) times daily.)    fexofenadine (ALLEGRA) 60 MG tablet Take 180 mg by mouth every morning.     fish oil-omega-3 fatty acids 300-1,000 mg capsule Take 1 g by mouth once daily.     fluticasone (FLONASE) 50 mcg/actuation nasal spray 1 spray by Each Nare route every morning.     furosemide (LASIX) 40 MG tablet TAKE 1 TABLET BY MOUTH EVERY DAY (Patient taking differently: Take 40 mg by mouth 2 (two) times a day.)    gabapentin (NEURONTIN) 300 MG capsule Take 2 capsules (600 mg total) by mouth 3 (three) times daily.    isosorbide mononitrate (IMDUR) 60 MG 24 hr tablet Take 60 mg by mouth once daily.    levalbuterol (XOPENEX) 1.25 mg/3 mL nebulizer solution SMARTSI Vial(s) Via Nebulizer    lorazepam (ATIVAN) 0.5 MG tablet Take 0.5 mg by mouth every morning.     losartan (COZAAR) 25 MG tablet Take 25 mg by mouth once daily.    metFORMIN  (GLUCOPHAGE) 500 MG tablet Take 500 mg by mouth 2 (two) times daily.    montelukast (SINGULAIR) 10 mg tablet Take 10 mg by mouth once daily.    mv-mn/folic ac/calcium/vit K1 (WOMEN'S 50 PLUS MULTIVITAMIN ORAL) Take 1 tablet by mouth once daily.    nitroGLYCERIN (NITROSTAT) 0.4 MG SL tablet Place 0.4 mg under the tongue every 5 (five) minutes as needed for Chest pain.    nystatin (MYCOSTATIN) powder Apply topically every 8 (eight) hours.    ondansetron (ZOFRAN-ODT) 4 MG TbDL Take 4 mg by mouth every 8 (eight) hours as needed (nausea).    oxybutynin (DITROPAN-XL) 5 MG TR24 Take 5 mg by mouth every other day.     pantoprazole (PROTONIX) 40 MG tablet TAKE 1 TABLET BY MOUTH EVERY DAY (Patient taking differently: Take 40 mg by mouth once daily.)    POLYSORBATE 80/GLYCERIN (REFRESH DRY EYE THERAPY OPHT) Apply to eye 2 (two) times daily.    potassium gluconate 550 mg (90 mg) Tab Take 180 mg by mouth 2 (two) times a day.    predniSONE (DELTASONE) 20 MG tablet Take 40 mg by mouth once daily.    ranolazine (RANEXA) 500 MG Tb12 Take 500 mg by mouth 2 (two) times daily.    SYMBICORT 160-4.5 mcg/actuation HFAA Inhale 1 puff into the lungs 2 (two) times daily.    vitamin D (VITAMIN D3) 1000 units Tab Take 2,000 Units by mouth once daily.    albuterol (PROVENTIL) 2.5 mg /3 mL (0.083 %) nebulizer solution USE 1 VIAL IN NEBULIZER EVERY 4 6 HOURS AS NEEDED    albuterol-ipratropium (DUO-NEB) 2.5 mg-0.5 mg/3 mL nebulizer solution NEBULIZE 1 VIAL EVERY 4 HOURS AS NEEDED    mupirocin (BACTROBAN) 2 % ointment Apply topically 2 (two) times daily.    [DISCONTINUED] aluminum & magnesium hydroxide-simethicone (MYLANTA MAX STRENGTH) 400-400-40 mg/5 mL suspension Take by mouth every 6 (six) hours as needed for Indigestion.    [DISCONTINUED] amoxicillin (AMOXIL) 500 MG capsule SMARTSI Capsule(s) By Mouth    [DISCONTINUED] atorvastatin (LIPITOR) 10 MG tablet Take 1 tablet (10 mg total) by mouth once daily.    [DISCONTINUED]  azelastine (ASTELIN) 137 mcg (0.1 %) nasal spray 1 spray (137 mcg total) by Nasal route 2 (two) times daily.    [DISCONTINUED] clopidogreL (PLAVIX) 75 mg tablet Take 1 tablet (75 mg total) by mouth once daily.    [DISCONTINUED] diltiaZEM (CARDIZEM LA) 360 mg 24 hr tablet Take 1 tablet (360 mg total) by mouth once daily.    [DISCONTINUED] diphenhydrAMINE (BENADRYL) 50 MG capsule Take 1 capsule (50 mg total) by mouth every 6 (six) hours as needed for Itching. (Patient not taking: Reported on 12/8/2021)    [DISCONTINUED] doxycycline (VIBRA-TABS) 100 MG tablet Take 100 mg by mouth 2 (two) times daily.    [DISCONTINUED] folic acid/multivit-min/lutein (CENTRUM SILVER ORAL) Take by mouth.    [DISCONTINUED] isosorbide mononitrate (IMDUR) 30 MG 24 hr tablet TAKE 1 TABLET BY MOUTH EVERY DAY    [DISCONTINUED] lisinopriL (PRINIVIL,ZESTRIL) 20 MG tablet Take 2 tablets (40 mg total) by mouth once daily.    [DISCONTINUED] nitroGLYCERIN (NITROSTAT) 0.4 MG SL tablet Place 1 tablet (0.4 mg total) under the tongue every 5 (five) minutes as needed for Chest pain.    [DISCONTINUED] polyethylene glycol (GLYCOLAX) 17 gram PwPk Take by mouth.     Family History       Problem Relation (Age of Onset)    Heart attack Mother    Heart failure Brother    Stroke Father          Tobacco Use    Smoking status: Never Smoker    Smokeless tobacco: Never Used   Substance and Sexual Activity    Alcohol use: No    Drug use: No    Sexual activity: Not Currently     Review of Systems   Constitutional: Negative. Negative for diaphoresis.   HENT: Negative.     Eyes: Negative.    Cardiovascular:  Positive for chest pain, dyspnea on exertion and leg swelling. Negative for near-syncope, orthopnea, palpitations, paroxysmal nocturnal dyspnea and syncope.   Respiratory: Negative.  Negative for shortness of breath.    Endocrine: Negative.    Hematologic/Lymphatic: Negative.    Skin: Negative.    Musculoskeletal:  Positive for arthritis.    Gastrointestinal:  Negative for nausea and vomiting.   Genitourinary: Negative.    Neurological:  Positive for dizziness.   Psychiatric/Behavioral:  Positive for memory loss.    Allergic/Immunologic: Negative.    Objective:     Vital Signs (Most Recent):  Temp: 98.4 °F (36.9 °C) (08/11/22 0833)  Pulse: 69 (08/11/22 1104)  Resp: 20 (08/11/22 1104)  BP: (!) 141/61 (08/11/22 1104)  SpO2: 95 % (08/11/22 1104)   Vital Signs (24h Range):  Temp:  [98.4 °F (36.9 °C)] 98.4 °F (36.9 °C)  Pulse:  [69-86] 69  Resp:  [20] 20  SpO2:  [95 %-96 %] 95 %  BP: (141-180)/(61-82) 141/61     Weight: 113.4 kg (250 lb)  Body mass index is 42.91 kg/m².    SpO2: 95 %       No intake or output data in the 24 hours ending 08/11/22 1232    Lines/Drains/Airways       Peripheral Intravenous Line  Duration                  Peripheral IV - Single Lumen 08/11/22 0857 20 G Left Forearm <1 day                    Physical Exam  Constitutional:       General: She is not in acute distress.     Appearance: She is obese. She is not diaphoretic.   HENT:      Head: Atraumatic.   Eyes:      General:         Right eye: No discharge.         Left eye: No discharge.   Cardiovascular:      Rate and Rhythm: Normal rate and regular rhythm.      Heart sounds: Murmur heard.   Pulmonary:      Effort: Pulmonary effort is normal.      Breath sounds: No rales.   Abdominal:      General: Bowel sounds are normal.      Palpations: Abdomen is soft.   Musculoskeletal:      Right lower leg: Edema present.      Left lower leg: Edema present.   Skin:     General: Skin is warm and dry.      Capillary Refill: Capillary refill takes 2 to 3 seconds.   Neurological:      Mental Status: She is alert. Mental status is at baseline.       Significant Labs: BMP:   Recent Labs   Lab 08/11/22  1023   *      K 4.3   CL 95   CO2 27   BUN 19   CREATININE 0.8   CALCIUM 9.9   , CMP   Recent Labs   Lab 08/11/22  1023      K 4.3   CL 95   CO2 27   *   BUN 19    CREATININE 0.8   CALCIUM 9.9   PROT 7.7   ALBUMIN 4.1   BILITOT 0.5   ALKPHOS 44*   AST 17   ALT 21   ANIONGAP 16   , CBC   Recent Labs   Lab 08/11/22 0901   WBC 8.38   HGB 13.2   HCT 39.7      , INR No results for input(s): INR, PROTIME in the last 48 hours., Lipid Panel No results for input(s): CHOL, HDL, LDLCALC, TRIG, CHOLHDL in the last 48 hours., Troponin   Recent Labs   Lab 08/11/22  0901   TROPONINI <0.006   , and All pertinent lab results from the last 24 hours have been reviewed.    Significant Imaging: Echocardiogram: Transthoracic echo (TTE) complete (Cupid Only):   Results for orders placed or performed during the hospital encounter of 11/19/20   Echo Color Flow Doppler? Yes   Result Value Ref Range    BSA 2.19 m2    TDI SEPTAL 0.07 m/s    LV LATERAL E/E' RATIO 10.89 m/s    LV SEPTAL E/E' RATIO 14.00 m/s    LA WIDTH 4.95 cm    TDI LATERAL 0.09 m/s    LVIDd 4.43 3.5 - 6.0 cm    IVS 1.07 0.6 - 1.1 cm    Posterior Wall 1.08 0.6 - 1.1 cm    LVIDs 2.96 2.1 - 4.0 cm    FS 33 28 - 44 %    LA volume 117.41 cm3    Sinus 3.46 cm    STJ 2.85 cm    Ascending aorta 3.21 cm    LV mass 165.30 g    LA size 4.44 cm    RVDD 3.14 cm    TAPSE 1.75 cm    Left Ventricle Relative Wall Thickness 0.49 cm    AV mean gradient 10 mmHg    AV valve area 1.44 cm2    AV Velocity Ratio 0.37     AV index (prosthetic) 0.39     Mean e' 0.08 m/s    IVRT 76.12 msec    Pulm vein S/D ratio 0.34     LVOT diameter 2.17 cm    LVOT area 3.7 cm2    LVOT peak mazin 0.82 m/s    LVOT peak VTI 18.27 cm    Ao peak mazin 2.24 m/s    Ao VTI 46.88 cm    LVOT stroke volume 67.53 cm3    AV peak gradient 20 mmHg    E/E' ratio 12.25 m/s    MV Peak E Mazin 0.98 m/s    TR Max Mazin 2.53 m/s    PV Peak S Mazin 0.25 m/s    PV Peak D Mazin 0.74 m/s    LV Systolic Volume 33.87 mL    LV Systolic Volume Index 16.2 mL/m2    LV Diastolic Volume 89.01 mL    LV Diastolic Volume Index 42.57 mL/m2    LA Volume Index 56.1 mL/m2    LV Mass Index 79 g/m2    RA Major Axis 5.05  cm    Left Atrium Minor Axis 6.25 cm    Left Atrium Major Axis 6.32 cm    Triscuspid Valve Regurgitation Peak Gradient 26 mmHg    RA Width 3.68 cm    Right Atrial Pressure (from IVC) 3 mmHg    TV rest pulmonary artery pressure 29 mmHg    Narrative    · The left ventricle is normal in size with normal systolic function. The   estimated ejection fraction is 60%.  · There is a 26 mm S3 transcutaneously-placed aortic bioprosthesis   present. There is no aortic aortic insufficiency present at all.  · There is left ventricular concentric remodeling.  · Severe left atrial enlargement.  · Diastolic dysfunction.  · Normal right ventricular size with normal right ventricular systolic   function.  · Mild tricuspid regurgitation.  · The estimated PA systolic pressure is 29 mmHg.  · Normal central venous pressure (3 mmHg).        Assessment and Plan:     BMI 40.0-44.9, adult  Weight loss encouraged     CAD (coronary artery disease)  EKG paced   Initial troponin negative      [facility] 2019 prior to TAVR:  1.  The LVH made coronary angiography very difficult.  A 10 cc was required for coronary angiograms.  2.  The left main was free of disease.  It was very short making cannulation of the LAD difficulty.  3.  The LAD had a long tubular stenosis of 70% at the diagonal bifurcation.  The diagonal was a larger vessel than the LAD making intervention on the LAD suboptimal.  4.  The left circumflex was normal.  5.  The right coronary artery was normal.    Patient reports [facility] in [place] without intervention in 2021  Continue Plavix, fenofibrate, statin, ARB, BB, Ranexa, Imdur- up titrate   Trend troponin overnight  TTE pending   Hold Eliquis and cover with dose of Lovenox 1 mg/kg x 1 this evening   No asa given risk of bleeding with triple therapy   Ischemic evaluation modality TBD based on troponin trend and TTE  Keep NPO p MN     Chest pain  Per CAD    Atrial flutter  Continue BB, paced at this time  Hold Eliquis and give a dose of Lovenox  1 mg/kg this evening x 1    Acute on chronic diastolic (congestive) heart failure   11/19/20    Echo Color Flow Doppler? Yes    Interpretation Summary  · The left ventricle is normal in size with normal systolic function. The estimated ejection fraction is 60%.  · There is a 26 mm S3 transcutaneously-placed aortic bioprosthesis present. There is no aortic aortic insufficiency present at all.  · There is left ventricular concentric remodeling.  · Severe left atrial enlargement.  · Diastolic dysfunction.  · Normal right ventricular size with normal right ventricular systolic function.  · Mild tricuspid regurgitation.  · The estimated PA systolic pressure is 29 mmHg.  · Normal central venous pressure (3 mmHg).    . Continue Beta Blocker, ACE/ARB and Nitrate/Vasodilator and monitor clinical status closely. Monitor on telemetry. Patient is on CHF pathway.  Monitor strict Is&Os and daily weights.  Place on fluid restriction of 2 L. Continue to stress to patient importance of self efficacy and  on diet for CHF.   Continue p.o. Lasix           Aortic stenosis  S/p TAVR    Essential hypertension  BP elevated in the 160s in the ED  Patient has not taken all of her home medications today   Resume home antihypertensives and up titrate PRN         VTE Risk Mitigation (From admission, onward)    None          Thank you for your consult. I will follow-up with patient. Please contact us if you have any additional questions.    Kris Avilez NP  Cardiology   [place] - Emergency Dept

## 2022-08-11 NOTE — PLAN OF CARE
08/11/22 1445   Nurse Notification   Charge Nurse Notified? Yes   Name of Charge Nurse hallie   Bedside Nurse Notified? Yes   Name of Bedside Nurse loli parson   Nurse Notfication Method Secure Chat   Provider Notification   Provider Notified? Yes   Name of Provider dr. collazo   Provider Notification Method Telephone   Provider Notified Of AI Deterioration Alert

## 2022-08-11 NOTE — PHARMACY MED REC
"  [facility]           Pharmacy  Admission Medication History     The home medication history was taken by Ema Ellsworth.      Medication history obtained from Medications listed below were obtained from: Patient/family    Based on information gathered for medication list, you may go to "Admission" then "Reconcile Home Medications" tabs to review and/or act upon those items.      The home medication list has been updated by the Pharmacy department.    Please read ALL comments highlighted in yellow.    Please address this information as you see fit.     Feel free to contact us if you have any questions or require assistance.    The medications listed below were removed from the home medication list.  Please reorder if appropriate:     Patient reports NOT TAKING the following medication(s):  o glycolax 17g  o Amoxil 500mg  o Doxycycline 100mg  o mylanta  o astelin 137% nasal spray  o Benadryl 50mg  o Lisinopril 20mg        Current Facility-Administered Medications on File Prior to Encounter   Medication Dose Route Frequency Provider Last Rate Last Admin    0.9%  NaCl infusion   Intravenous Continuous Lynette Hightower NP   New Bag at 02/10/20 1029    sodium chloride 0.9% flush 5 mL  5 mL Intravenous PRN Lynette Hightower NP         Current Outpatient Medications on File Prior to Encounter   Medication Sig Dispense Refill    acetaminophen (TYLENOL) 650 MG TbSR Take 1,300 mg by mouth 2 (two) times daily.      ascorbic acid, vitamin C, (VITAMIN C) 500 MG tablet Take 1,000 mg by mouth once daily.       atorvastatin (LIPITOR) 10 MG tablet Take 10 mg by mouth once daily.      bisacodyl (DULCOLAX) 5 mg EC tablet Take 5 mg by mouth daily as needed for Constipation.      calcium carbonate/vitamin D3 (CALTRATE 600 + D ORAL) Take 1 tablet by mouth once daily.      carvediloL (COREG) 6.25 MG tablet Take 6.25 mg by mouth 2 (two) times daily.      clopidogreL (PLAVIX) 75 mg tablet Take " 75 mg by mouth once daily.      cyanocobalamin (VITAMIN B-12) 1000 MCG tablet Take 1,000 mcg by mouth once daily.      diltiaZEM (CARDIZEM CD) 240 MG 24 hr capsule Take 240 mg by mouth once daily.      DULoxetine (CYMBALTA) 20 MG capsule TAKE 1 CAPSULE BY MOUTH EVERY DAY (Patient taking differently: Take 20 mg by mouth once daily.) 90 capsule 0    ELIQUIS 5 mg Tab TAKE 1 TABLET BY MOUTH TWICE A DAY (Patient taking differently: Take 5 mg by mouth 2 (two) times daily.) 180 tablet 3    fexofenadine (ALLEGRA) 60 MG tablet Take 180 mg by mouth every morning.       fish oil-omega-3 fatty acids 300-1,000 mg capsule Take 1 g by mouth once daily.       fluticasone (FLONASE) 50 mcg/actuation nasal spray 1 spray by Each Nare route every morning.       furosemide (LASIX) 40 MG tablet TAKE 1 TABLET BY MOUTH EVERY DAY (Patient taking differently: Take 40 mg by mouth 2 (two) times a day.) 90 tablet 3    gabapentin (NEURONTIN) 300 MG capsule Take 2 capsules (600 mg total) by mouth 3 (three) times daily. 540 capsule 3    isosorbide mononitrate (IMDUR) 60 MG 24 hr tablet Take 60 mg by mouth once daily.      levalbuterol (XOPENEX) 1.25 mg/3 mL nebulizer solution SMARTSI Vial(s) Via Nebulizer      lorazepam (ATIVAN) 0.5 MG tablet Take 0.5 mg by mouth every morning.       losartan (COZAAR) 25 MG tablet Take 25 mg by mouth once daily.      metFORMIN (GLUCOPHAGE) 500 MG tablet Take 500 mg by mouth 2 (two) times daily.  1    montelukast (SINGULAIR) 10 mg tablet Take 10 mg by mouth once daily.      mv-mn/folic ac/calcium/vit K1 (WOMEN'S 50 PLUS MULTIVITAMIN ORAL) Take 1 tablet by mouth once daily.      nitroGLYCERIN (NITROSTAT) 0.4 MG SL tablet Place 0.4 mg under the tongue every 5 (five) minutes as needed for Chest pain.      nystatin (MYCOSTATIN) powder Apply topically every 8 (eight) hours. 60 g 0    ondansetron (ZOFRAN-ODT) 4 MG TbDL Take 4 mg by mouth every 8 (eight) hours as needed (nausea).  1    oxybutynin  (DITROPAN-XL) 5 MG TR24 Take 5 mg by mouth every other day.       pantoprazole (PROTONIX) 40 MG tablet TAKE 1 TABLET BY MOUTH EVERY DAY (Patient taking differently: Take 40 mg by mouth once daily.) 90 tablet 3    POLYSORBATE 80/GLYCERIN (REFRESH DRY EYE THERAPY OPHT) Apply to eye 2 (two) times daily.      potassium gluconate 550 mg (90 mg) Tab Take 180 mg by mouth 2 (two) times a day.      predniSONE (DELTASONE) 20 MG tablet Take 40 mg by mouth once daily.      ranolazine (RANEXA) 500 MG Tb12 Take 500 mg by mouth 2 (two) times daily.      SYMBICORT 160-4.5 mcg/actuation HFAA Inhale 1 puff into the lungs 2 (two) times daily.      vitamin D (VITAMIN D3) 1000 units Tab Take 2,000 Units by mouth once daily.      albuterol (PROVENTIL) 2.5 mg /3 mL (0.083 %) nebulizer solution USE 1 VIAL IN NEBULIZER EVERY 4 6 HOURS AS NEEDED      albuterol-ipratropium (DUO-NEB) 2.5 mg-0.5 mg/3 mL nebulizer solution NEBULIZE 1 VIAL EVERY 4 HOURS AS NEEDED      mupirocin (BACTROBAN) 2 % ointment Apply topically 2 (two) times daily.      [DISCONTINUED] aluminum & magnesium hydroxide-simethicone (MYLANTA MAX STRENGTH) 400-400-40 mg/5 mL suspension Take by mouth every 6 (six) hours as needed for Indigestion.      [DISCONTINUED] amoxicillin (AMOXIL) 500 MG capsule SMARTSI Capsule(s) By Mouth      [DISCONTINUED] atorvastatin (LIPITOR) 10 MG tablet Take 1 tablet (10 mg total) by mouth once daily. 90 tablet 3    [DISCONTINUED] azelastine (ASTELIN) 137 mcg (0.1 %) nasal spray 1 spray (137 mcg total) by Nasal route 2 (two) times daily. 30 mL 3    [DISCONTINUED] clopidogreL (PLAVIX) 75 mg tablet Take 1 tablet (75 mg total) by mouth once daily. 90 tablet 3    [DISCONTINUED] diltiaZEM (CARDIZEM LA) 360 mg 24 hr tablet Take 1 tablet (360 mg total) by mouth once daily. 90 tablet 4    [DISCONTINUED] diphenhydrAMINE (BENADRYL) 50 MG capsule Take 1 capsule (50 mg total) by mouth every 6 (six) hours as needed for Itching. (Patient not  taking: Reported on 12/8/2021) 1 capsule 0    [DISCONTINUED] doxycycline (VIBRA-TABS) 100 MG tablet Take 100 mg by mouth 2 (two) times daily.      [DISCONTINUED] folic acid/multivit-min/lutein (CENTRUM SILVER ORAL) Take by mouth.      [DISCONTINUED] isosorbide mononitrate (IMDUR) 30 MG 24 hr tablet TAKE 1 TABLET BY MOUTH EVERY DAY 90 tablet 3    [DISCONTINUED] lisinopriL (PRINIVIL,ZESTRIL) 20 MG tablet Take 2 tablets (40 mg total) by mouth once daily. 180 tablet 3    [DISCONTINUED] nitroGLYCERIN (NITROSTAT) 0.4 MG SL tablet Place 1 tablet (0.4 mg total) under the tongue every 5 (five) minutes as needed for Chest pain. 30 tablet 11    [DISCONTINUED] polyethylene glycol (GLYCOLAX) 17 gram PwPk Take by mouth.         Please address this information as you see fit.  Feel free to contact us if you have any questions or require assistance.    Ema Ellsworth  [phone]                .

## 2022-08-11 NOTE — ED PROVIDER NOTES
Encounter Date: 8/11/2022       History     Chief Complaint   Patient presents with    Chest Pain     Chest pain began at 0400 that pt describes as pressure. Pt also reporting generalized weakness.      Patient is a 77-year-old female who awoke this morning at around 4:00 a.m. left-sided chest discomfort.  Patient took 2 sublingual nitroglycerin with partial relief.  She had mild shortness of breath associated with this.  She denies nausea or vomiting.  No diaphoresis.  Patient reports now feeling the pain on the right side of her chest and right upper back.        Review of patient's allergies indicates:   Allergen Reactions    Celebrex [celecoxib] Shortness Of Breath    Ciprofloxacin Swelling     lip    Dicyclomine Hives    Adhesive Dermatitis    Avelox [moxifloxacin] Swelling    Cilostazol Other (See Comments)     Elevates blood pressure    Eryc [erythromycin] Other (See Comments)     unknown    Iodine and iodide containing products Hives    Keflex [cephalexin] Hives    Meclomen      rashes    Meloxicam      Ear ringing    Metoclopramide Other (See Comments)     High blood pressure    Sulfa (sulfonamide antibiotics) Itching     Past Medical History:   Diagnosis Date    Acute on chronic diastolic (congestive) heart failure 11/20/2019    Anticoagulant long-term use     on Plavix since May 2015    Anxiety     Arthritis     Asthma     Atrial fibrillation     Atrial fibrillation with RVR 10/19/2020    Cataracts, bilateral     Chest pain, atypical     Chronic atrial fibrillation with rapid ventricular response 6/23/2017    Colon polyp     Coronary artery disease     Diabetes mellitus     Dry eyes     Esophageal erosions     GERD (gastroesophageal reflux disease)     Heart murmur     Hemorrhoid     High cholesterol     Hypertension     Irritable bowel syndrome     Paroxysmal atrial fibrillation 10/30/2020    Persistent atrial fibrillation 2/10/2020    Shingles 2015    Stenosis of  aortic and mitral valves     Stroke     TIA (transient ischemic attack)     Use of cane as ambulatory aid      Past Surgical History:   Procedure Laterality Date    ABDOMINAL SURGERY      stents to SMA    angiocele      2007, 2014    AORTIC VALVE REPLACEMENT N/A 11/19/2019    Procedure: Replacement-valve-aortic;  Surgeon: Jack Capone MD;  Location: [facility] CATH LAB;  Service: Cardiology;  Laterality: N/A;    BREAST SURGERY      left--- a lump--- no cancer    CARDIAC VALVE SURGERY      COLONOSCOPY  2014    COLONOSCOPY N/A 3/14/2018    Procedure: COLONOSCOPY;  Surgeon: Efren Kemp MD;  Location: [facility] ENDO ([facility] FLR);  Service: Endoscopy;  Laterality: N/A;  ok to hold Plavix 5 days prior to procedure per Dr RESHMA Hernandez     ok per Dr Kemp to hold Eliquis 2 days prior to procedure (see telephone encounter dated-2/7/18)    HERNIA REPAIR      HYSTERECTOMY  partial    1982 partial hysterectomy    LEFT HEART CATHETERIZATION Right 11/5/2019    Procedure: Left heart cath;  Surgeon: Jack Capone MD;  Location: [facility] CATH LAB;  Service: Cardiology;  Laterality: Right;    left nasal polyp      left neck lymph node      nose polyp      right hip fatty mass tissue      stent to small intestine      SUPERIOR VENA CAVA ANGIOPLASTY / STENTING      TONSILLECTOMY      TOTAL ABDOMINAL HYSTERECTOMY      2014    TOTAL KNEE ARTHROPLASTY Bilateral     TREATMENT OF CARDIAC ARRHYTHMIA N/A 2/10/2020    Procedure: CARDIOVERSION;  Surgeon: Jayy Parrish MD;  Location: [facility] EP LAB;  Service: Cardiology;  Laterality: N/A;  AF, PEDRO, DCCV, MAC, DM, 3 Prep    TREATMENT OF CARDIAC ARRHYTHMIA N/A 5/15/2020    Procedure: CARDIOVERSION;  Surgeon: Hany Bee MD;  Location: [facility] EP LAB;  Service: Cardiology;  Laterality: N/A;  AF, PEDRO, DCCV, MAC, DM, 3 Prep    UPPER GASTROINTESTINAL ENDOSCOPY  2014     Family History   Problem Relation Age of Onset    Heart attack Mother     Stroke Father     Heart failure Brother      Colon cancer Neg Hx     Inflammatory bowel disease Neg Hx      Social History     Tobacco Use    Smoking status: Never Smoker    Smokeless tobacco: Never Used   Substance Use Topics    Alcohol use: No    Drug use: No     Review of Systems   Constitutional: Negative for chills and fever.   Respiratory: Positive for shortness of breath.    Cardiovascular: Positive for chest pain.   Gastrointestinal: Negative for abdominal pain, nausea and vomiting.   Musculoskeletal: Positive for back pain.   Neurological: Negative for dizziness and syncope.       Physical Exam     Initial Vitals [08/11/22 0833]   BP Pulse Resp Temp SpO2   (!) 180/77 86 20 98.4 °F (36.9 °C) 96 %      MAP       --         Physical Exam    Nursing note and vitals reviewed.  Constitutional: No distress.   HENT:   Head: Normocephalic and atraumatic.   Eyes: Conjunctivae and EOM are normal. Pupils are equal, round, and reactive to light.   Neck: Neck supple.   Normal range of motion.  Cardiovascular: Normal rate and regular rhythm.   Pulmonary/Chest: Breath sounds normal.   Abdominal: Abdomen is soft. There is no abdominal tenderness. There is no rebound and no guarding.   Musculoskeletal:         General: No edema. Normal range of motion.      Cervical back: Normal range of motion and neck supple.     Neurological: She is alert and oriented to person, place, and time.   Skin: Skin is warm and dry.   Psychiatric: She has a normal mood and affect. Thought content normal.         ED Course   Procedures  Labs Reviewed   CBC W/ AUTO DIFFERENTIAL - Abnormal; Notable for the following components:       Result Value    Gran % 79.8 (*)     Lymph % 16.1 (*)     Mono % 3.5 (*)     All other components within normal limits   COMPREHENSIVE METABOLIC PANEL - Abnormal; Notable for the following components:    Glucose 140 (*)     Alkaline Phosphatase 44 (*)     All other components within normal limits   CK   TROPONIN I   TROPONIN I   TROPONIN I   TSH   HEMOGLOBIN  A1C   LIPID PANEL   B-TYPE NATRIURETIC PEPTIDE   MAGNESIUM   PHOSPHORUS   SARS-COV-2 RDRP GENE   SARS-COV-2 RDRP GENE     EKG Readings: (Independently Interpreted)   Initial Reading: No STEMI. Heart Rate: 69. Conduction: Nonspecific intraventricular block.     ECG Results          EKG 12-lead (In process)  Result time 08/11/22 09:25:53    In process by Interface, Lab In [facility] (08/11/22 09:25:53)                 Narrative:    Test Reason : R07.9,    Vent. Rate : 069 BPM     Atrial Rate : 416 BPM     P-R Int : 000 ms          QRS Dur : 150 ms      QT Int : 458 ms       P-R-T Axes : 000 239 054 degrees     QTc Int : 490 ms    Wide QRS rhythm  Nonspecific intraventricular block  Possible Lateral infarct ,age undetermined  Abnormal ECG  When compared with ECG of 04-DEC-2021 21:36,  Wide QRS rhythm has replaced Atrial fibrillation    Referred By: [email or web address]   SELF           Confirmed By:                             Imaging Results          X-Ray Chest 1 View (Final result)  Result time 08/11/22 09:03:34    Final result by Chencho Linares MD (08/11/22 09:03:34)                 Impression:      No acute radiographic findings in the chest.      Electronically signed by: Chencho Linares MD  Date:    08/11/2022  Time:    09:03             Narrative:    EXAMINATION:  XR CHEST 1 VIEW    CLINICAL HISTORY:  Chest pain;    TECHNIQUE:  Single frontal view of the chest was performed.    COMPARISON:  12/04/2021    FINDINGS:  There are low lung volumes with elevation of the right hemidiaphragm.  Cardiac silhouette is enlarged but stable in size.  There are aortic calcifications with a percutaneous aortic valve.  There is a left-sided pacemaker.  No significant consolidation, large effusion or pneumothorax.  Bones show degenerative changes..                                 Medications   nitroGLYCERIN SL tablet 0.4 mg (0.4 mg Sublingual Given 8/11/22 1324)   albuterol sulfate nebulizer solution 2.5 mg (has no administration in time range)    atorvastatin tablet 10 mg (10 mg Oral Given 8/11/22 1329)   bisacodyL EC tablet 5 mg (has no administration in time range)   carvediloL tablet 25 mg (has no administration in time range)   clopidogreL tablet 75 mg (75 mg Oral Given 8/11/22 1329)   cyanocobalamin tablet 1,000 mcg (1,000 mcg Oral Given 8/11/22 1329)   diltiaZEM 24 hr capsule 240 mg (240 mg Oral Given 8/11/22 1329)   DULoxetine DR capsule 20 mg (has no administration in time range)   cetirizine tablet 10 mg (10 mg Oral Given 8/11/22 1329)   fluticasone propionate 50 mcg/actuation nasal spray 50 mcg (has no administration in time range)   furosemide tablet 40 mg (40 mg Oral Given 8/11/22 1329)   gabapentin capsule 600 mg (has no administration in time range)   isosorbide mononitrate 24 hr tablet 60 mg (60 mg Oral Given 8/11/22 1329)   LORazepam tablet 0.5 mg (0.5 mg Oral Given 8/11/22 1329)   losartan tablet 25 mg (25 mg Oral Not Given 8/11/22 1400)   miconazole NITRATE 2 % top powder (has no administration in time range)   ondansetron disintegrating tablet 4 mg (has no administration in time range)   oxybutynin 24 hr tablet 5 mg (has no administration in time range)   pantoprazole EC tablet 40 mg (40 mg Oral Given 8/11/22 1329)   ranolazine 12 hr tablet 500 mg (has no administration in time range)   sodium chloride 0.9% flush 10 mL (has no administration in time range)   enoxaparin injection 40 mg (40 mg Subcutaneous Given 8/11/22 1328)     Medical Decision Making:   History:   Old Medical Records: I decided to obtain old medical records.  Initial Assessment:   77-year-old female with chest pain.  Clinical Tests:   Lab Tests: Ordered and Reviewed  Radiological Study: Ordered and Reviewed  ED Management:  Initial troponin is normal.  Chest x-ray without acute abnormalities.  EKG is without acute injury pattern.  Based on the patient's age, risk factors and presentation, I feel she should be admitted for observation.  She will be admitted by [facility].  Cardiology has been consulted.  Other:   I have discussed this case with another health care provider.                      Clinical Impression:   Final diagnoses:  [R07.9] Chest pain          ED Disposition Condition    Observation               Davian Carpio MD  08/11/22 9290

## 2022-08-11 NOTE — PROGRESS NOTES
08/11/22 1514   Admission   Initial VN Admission Questions Complete   Communication Issues? None   Shift   Virtual Nurse - Rounding Complete   Pain Management Interventions care clustered;diversional activity provided;pain management plan reviewed with patient/caregiver;quiet environment facilitated;relaxation techniques promoted   Virtual Nurse - Patient Verbalized Approval Of Camera Use;VN Rounding   Type of Frequent Check   Type Patient Rounds;Other (see comments);Telemetry Monitoring  (New admission)   Safety/Activity   Patient Rounds bed in low position;bed wheels locked;call light in patient/parent reach;clutter free environment maintained;visualized patient;placement of personal items at bedside;ID band on   Safety Promotion/Fall Prevention assistive device/personal item within reach;bed alarm set;diversional activities provided;Fall Risk reviewed with patient/family;family to remain at bedside;side rails raised x 2;instructed to call staff for mobility   Safety Precautions emergency equipment at bedside   Activity Management Rolling - L1   Positioning   Body Position position changed independently   Head of Bed (HOB) Positioning HOB at 60 degrees   VN cued into patient's room for introduction with patient's permission.  VN role explained and informed patient that VN would be working with bedside nurse and rest of care team.  Fall risk and bed alarm protocol education provided.  Instructed patient to call for assistance.  Patient aware and agreeable.  Patient's chart, labs and vital signs reviewed.  Allowed time for questions.  No acute distress noted.  Will continue to be available as needed.

## 2022-08-11 NOTE — ASSESSMENT & PLAN NOTE
EKG paced   Initial troponin negative      [facility] 2019 prior to TAVR:  1.  The LVH made coronary angiography very difficult.  A 10 cc was required for coronary angiograms.  2.  The left main was free of disease.  It was very short making cannulation of the LAD difficulty.  3.  The LAD had a long tubular stenosis of 70% at the diagonal bifurcation.  The diagonal was a larger vessel than the LAD making intervention on the LAD suboptimal.  4.  The left circumflex was normal.  5.  The right coronary artery was normal.    Patient reports [facility] in [place] without intervention in 2021  Continue Plavix, fenofibrate, statin, ARB, BB, Ranexa, Imdur- up titrate   Trend troponin overnight  TTE pending   Hold Eliquis and cover with dose of Lovenox 1 mg/kg x 1 this evening   No asa given risk of bleeding with triple therapy   Ischemic evaluation modality TBD based on troponin trend and TTE  Keep NPO p MN

## 2022-08-11 NOTE — ASSESSMENT & PLAN NOTE
BP elevated in the 160s in the ED  Patient has not taken all of her home medications today   Resume home antihypertensives and up titrate PRN

## 2022-08-12 VITALS
RESPIRATION RATE: 18 BRPM | HEART RATE: 69 BPM | OXYGEN SATURATION: 95 % | BODY MASS INDEX: 44.05 KG/M2 | HEIGHT: 64 IN | DIASTOLIC BLOOD PRESSURE: 58 MMHG | TEMPERATURE: 96 F | SYSTOLIC BLOOD PRESSURE: 121 MMHG | WEIGHT: 258 LBS

## 2022-08-12 DIAGNOSIS — I25.118 CORONARY ARTERY DISEASE OF NATIVE ARTERY OF NATIVE HEART WITH STABLE ANGINA PECTORIS: Primary | ICD-10-CM

## 2022-08-12 LAB
ALBUMIN SERPL BCP-MCNC: 3.7 G/DL (ref 3.5–5.2)
ALP SERPL-CCNC: 37 U/L (ref 55–135)
ALT SERPL W/O P-5'-P-CCNC: 21 U/L (ref 10–44)
ANION GAP SERPL CALC-SCNC: 11 MMOL/L (ref 8–16)
AST SERPL-CCNC: 16 U/L (ref 10–40)
BASOPHILS # BLD AUTO: 0.01 K/UL (ref 0–0.2)
BASOPHILS NFR BLD: 0.1 % (ref 0–1.9)
BILIRUB SERPL-MCNC: 0.4 MG/DL (ref 0.1–1)
BUN SERPL-MCNC: 26 MG/DL (ref 8–23)
CALCIUM SERPL-MCNC: 9.4 MG/DL (ref 8.7–10.5)
CHLORIDE SERPL-SCNC: 98 MMOL/L (ref 95–110)
CO2 SERPL-SCNC: 31 MMOL/L (ref 23–29)
CREAT SERPL-MCNC: 0.8 MG/DL (ref 0.5–1.4)
DIFFERENTIAL METHOD: ABNORMAL
EOSINOPHIL # BLD AUTO: 0 K/UL (ref 0–0.5)
EOSINOPHIL NFR BLD: 0 % (ref 0–8)
ERYTHROCYTE [DISTWIDTH] IN BLOOD BY AUTOMATED COUNT: 14 % (ref 11.5–14.5)
EST. GFR  (NO RACE VARIABLE): >60 ML/MIN/1.73 M^2
GLUCOSE SERPL-MCNC: 119 MG/DL (ref 70–110)
HCT VFR BLD AUTO: 38.2 % (ref 37–48.5)
HGB BLD-MCNC: 12.6 G/DL (ref 12–16)
IMM GRANULOCYTES # BLD AUTO: 0.05 K/UL (ref 0–0.04)
IMM GRANULOCYTES NFR BLD AUTO: 0.5 % (ref 0–0.5)
LYMPHOCYTES # BLD AUTO: 1.8 K/UL (ref 1–4.8)
LYMPHOCYTES NFR BLD: 17.1 % (ref 18–48)
MCH RBC QN AUTO: 31 PG (ref 27–31)
MCHC RBC AUTO-ENTMCNC: 33 G/DL (ref 32–36)
MCV RBC AUTO: 94 FL (ref 82–98)
MONOCYTES # BLD AUTO: 0.7 K/UL (ref 0.3–1)
MONOCYTES NFR BLD: 7 % (ref 4–15)
NEUTROPHILS # BLD AUTO: 7.9 K/UL (ref 1.8–7.7)
NEUTROPHILS NFR BLD: 75.3 % (ref 38–73)
NRBC BLD-RTO: 0 /100 WBC
PLATELET # BLD AUTO: 239 K/UL (ref 150–450)
PMV BLD AUTO: 10.6 FL (ref 9.2–12.9)
POTASSIUM SERPL-SCNC: 3.9 MMOL/L (ref 3.5–5.1)
PROT SERPL-MCNC: 6.9 G/DL (ref 6–8.4)
RBC # BLD AUTO: 4.07 M/UL (ref 4–5.4)
SODIUM SERPL-SCNC: 140 MMOL/L (ref 136–145)
WBC # BLD AUTO: 10.54 K/UL (ref 3.9–12.7)

## 2022-08-12 PROCEDURE — 25000003 PHARM REV CODE 250: Performed by: STUDENT IN AN ORGANIZED HEALTH CARE EDUCATION/TRAINING PROGRAM

## 2022-08-12 PROCEDURE — 91300 PHARM REV CODE 636 W HCPCS: CPT | Performed by: INTERNAL MEDICINE

## 2022-08-12 PROCEDURE — 0004A HC IMMUNIZ ADMIN, SARS-COV-2 COVID-19 VACC, 30MCG/0.3ML, BOOSTER DOSE: CPT | Performed by: INTERNAL MEDICINE

## 2022-08-12 PROCEDURE — 99900035 HC TECH TIME PER 15 MIN (STAT)

## 2022-08-12 PROCEDURE — 36415 COLL VENOUS BLD VENIPUNCTURE: CPT | Performed by: STUDENT IN AN ORGANIZED HEALTH CARE EDUCATION/TRAINING PROGRAM

## 2022-08-12 PROCEDURE — 85025 COMPLETE CBC W/AUTO DIFF WBC: CPT | Performed by: STUDENT IN AN ORGANIZED HEALTH CARE EDUCATION/TRAINING PROGRAM

## 2022-08-12 PROCEDURE — 25000003 PHARM REV CODE 250: Performed by: NURSE PRACTITIONER

## 2022-08-12 PROCEDURE — 80053 COMPREHEN METABOLIC PANEL: CPT | Performed by: STUDENT IN AN ORGANIZED HEALTH CARE EDUCATION/TRAINING PROGRAM

## 2022-08-12 PROCEDURE — 63600175 PHARM REV CODE 636 W HCPCS: Performed by: INTERNAL MEDICINE

## 2022-08-12 PROCEDURE — 25000242 PHARM REV CODE 250 ALT 637 W/ HCPCS: Performed by: STUDENT IN AN ORGANIZED HEALTH CARE EDUCATION/TRAINING PROGRAM

## 2022-08-12 PROCEDURE — 99213 PR OFFICE/OUTPT VISIT, EST, LEVL III, 20-29 MIN: ICD-10-PCS | Mod: ,,, | Performed by: NURSE PRACTITIONER

## 2022-08-12 PROCEDURE — G0378 HOSPITAL OBSERVATION PER HR: HCPCS

## 2022-08-12 PROCEDURE — 94761 N-INVAS EAR/PLS OXIMETRY MLT: CPT

## 2022-08-12 PROCEDURE — 99213 OFFICE O/P EST LOW 20 MIN: CPT | Mod: ,,, | Performed by: NURSE PRACTITIONER

## 2022-08-12 RX ORDER — ALBUTEROL SULFATE 0.83 MG/ML
2.5 SOLUTION RESPIRATORY (INHALATION) EVERY 4 HOURS PRN
Qty: 75 ML | Refills: 10 | Status: ON HOLD | OUTPATIENT
Start: 2022-08-12 | End: 2022-10-02 | Stop reason: HOSPADM

## 2022-08-12 RX ORDER — ONDANSETRON 4 MG/1
4 TABLET, ORALLY DISINTEGRATING ORAL EVERY 8 HOURS PRN
Qty: 60 TABLET | Refills: 1 | Status: SHIPPED | OUTPATIENT
Start: 2022-08-12 | End: 2022-09-30 | Stop reason: CLARIF

## 2022-08-12 RX ORDER — BISACODYL 5 MG
5 TABLET, DELAYED RELEASE (ENTERIC COATED) ORAL DAILY PRN
Qty: 90 TABLET | Refills: 0 | Status: SHIPPED | OUTPATIENT
Start: 2022-08-12 | End: 2023-08-12

## 2022-08-12 RX ORDER — ISOSORBIDE MONONITRATE 60 MG/1
60 TABLET, EXTENDED RELEASE ORAL DAILY
Qty: 90 TABLET | Refills: 3 | Status: ON HOLD | OUTPATIENT
Start: 2022-08-12 | End: 2023-09-26

## 2022-08-12 RX ORDER — GABAPENTIN 300 MG/1
600 CAPSULE ORAL 3 TIMES DAILY
Qty: 540 CAPSULE | Refills: 3 | Status: SHIPPED | OUTPATIENT
Start: 2022-08-12 | End: 2023-09-21 | Stop reason: CLARIF

## 2022-08-12 RX ORDER — ATORVASTATIN CALCIUM 10 MG/1
10 TABLET, FILM COATED ORAL DAILY
Qty: 90 TABLET | Refills: 3 | Status: ON HOLD | OUTPATIENT
Start: 2022-08-12 | End: 2023-09-26

## 2022-08-12 RX ORDER — OXYBUTYNIN CHLORIDE 5 MG/1
5 TABLET, EXTENDED RELEASE ORAL EVERY OTHER DAY
Qty: 45 TABLET | Refills: 3 | Status: ON HOLD | OUTPATIENT
Start: 2022-08-12 | End: 2023-09-26

## 2022-08-12 RX ORDER — FUROSEMIDE 40 MG/1
40 TABLET ORAL 2 TIMES DAILY
Qty: 180 TABLET | Refills: 3 | Status: ON HOLD | OUTPATIENT
Start: 2022-08-12 | End: 2023-09-26

## 2022-08-12 RX ORDER — DULOXETIN HYDROCHLORIDE 20 MG/1
20 CAPSULE, DELAYED RELEASE ORAL DAILY
Qty: 90 CAPSULE | Refills: 3 | Status: ON HOLD | OUTPATIENT
Start: 2022-08-12 | End: 2023-09-26

## 2022-08-12 RX ORDER — NITROGLYCERIN 0.4 MG/1
0.4 TABLET SUBLINGUAL EVERY 5 MIN PRN
Qty: 25 TABLET | Status: SHIPPED | OUTPATIENT
Start: 2022-08-12 | End: 2024-02-11

## 2022-08-12 RX ORDER — CARVEDILOL 6.25 MG/1
6.25 TABLET ORAL 2 TIMES DAILY
Qty: 180 TABLET | Refills: 3 | Status: SHIPPED | OUTPATIENT
Start: 2022-08-12 | End: 2023-04-05 | Stop reason: SDUPTHER

## 2022-08-12 RX ORDER — RANOLAZINE 500 MG/1
1000 TABLET, EXTENDED RELEASE ORAL 2 TIMES DAILY
Status: DISCONTINUED | OUTPATIENT
Start: 2022-08-12 | End: 2022-08-12 | Stop reason: HOSPADM

## 2022-08-12 RX ORDER — CLOPIDOGREL BISULFATE 75 MG/1
75 TABLET ORAL DAILY
Qty: 90 TABLET | Refills: 3 | Status: SHIPPED | OUTPATIENT
Start: 2022-08-12 | End: 2023-04-05 | Stop reason: SDUPTHER

## 2022-08-12 RX ORDER — LANOLIN ALCOHOL/MO/W.PET/CERES
1000 CREAM (GRAM) TOPICAL DAILY
Qty: 90 TABLET | Refills: 3 | Status: SHIPPED | OUTPATIENT
Start: 2022-08-12 | End: 2023-08-12

## 2022-08-12 RX ORDER — METFORMIN HYDROCHLORIDE 500 MG/1
500 TABLET ORAL 2 TIMES DAILY
Qty: 180 TABLET | Refills: 3 | Status: ON HOLD | OUTPATIENT
Start: 2022-08-12 | End: 2023-09-26

## 2022-08-12 RX ORDER — NYSTATIN 100000 [USP'U]/G
POWDER TOPICAL EVERY 8 HOURS
Qty: 60 G | Refills: 0 | Status: SHIPPED | OUTPATIENT
Start: 2022-08-12 | End: 2022-09-30 | Stop reason: CLARIF

## 2022-08-12 RX ORDER — PANTOPRAZOLE SODIUM 40 MG/1
40 TABLET, DELAYED RELEASE ORAL DAILY
Qty: 90 TABLET | Refills: 3 | Status: SHIPPED | OUTPATIENT
Start: 2022-08-12

## 2022-08-12 RX ORDER — LOSARTAN POTASSIUM 25 MG/1
25 TABLET ORAL DAILY
Qty: 90 TABLET | Refills: 3 | Status: ON HOLD | OUTPATIENT
Start: 2022-08-12 | End: 2023-09-26

## 2022-08-12 RX ORDER — FEXOFENADINE HCL 60 MG
180 TABLET ORAL EVERY MORNING
Qty: 270 TABLET | Refills: 3 | Status: SHIPPED | OUTPATIENT
Start: 2022-08-12 | End: 2023-09-21 | Stop reason: CLARIF

## 2022-08-12 RX ORDER — FLUTICASONE PROPIONATE 50 MCG
1 SPRAY, SUSPENSION (ML) NASAL EVERY MORNING
Qty: 16 G | Status: SHIPPED | OUTPATIENT
Start: 2022-08-12 | End: 2023-08-12

## 2022-08-12 RX ORDER — DILTIAZEM HYDROCHLORIDE 240 MG/1
240 CAPSULE, COATED, EXTENDED RELEASE ORAL DAILY
Qty: 90 CAPSULE | Refills: 3 | Status: SHIPPED | OUTPATIENT
Start: 2022-08-12 | End: 2023-05-26

## 2022-08-12 RX ORDER — LORAZEPAM 0.5 MG/1
0.5 TABLET ORAL EVERY MORNING
Qty: 30 TABLET | Refills: 2 | Status: SHIPPED | OUTPATIENT
Start: 2022-08-12 | End: 2024-02-11

## 2022-08-12 RX ORDER — RANOLAZINE 500 MG/1
500 TABLET, EXTENDED RELEASE ORAL 2 TIMES DAILY
Qty: 180 TABLET | Refills: 3 | Status: SHIPPED | OUTPATIENT
Start: 2022-08-12 | End: 2022-10-26 | Stop reason: SDUPTHER

## 2022-08-12 RX ADMIN — FUROSEMIDE 40 MG: 40 TABLET ORAL at 08:08

## 2022-08-12 RX ADMIN — CARVEDILOL 6.25 MG: 6.25 TABLET, FILM COATED ORAL at 08:08

## 2022-08-12 RX ADMIN — LOSARTAN POTASSIUM 25 MG: 25 TABLET, FILM COATED ORAL at 08:08

## 2022-08-12 RX ADMIN — BNT162B2 0.3 ML: 0.23 INJECTION, SUSPENSION INTRAMUSCULAR at 03:08

## 2022-08-12 RX ADMIN — GABAPENTIN 600 MG: 300 CAPSULE ORAL at 03:08

## 2022-08-12 RX ADMIN — LORAZEPAM 0.5 MG: 0.5 TABLET ORAL at 07:08

## 2022-08-12 RX ADMIN — APIXABAN 5 MG: 5 TABLET, FILM COATED ORAL at 12:08

## 2022-08-12 RX ADMIN — ATORVASTATIN CALCIUM 10 MG: 10 TABLET, FILM COATED ORAL at 08:08

## 2022-08-12 RX ADMIN — DULOXETINE 20 MG: 20 CAPSULE, DELAYED RELEASE ORAL at 08:08

## 2022-08-12 RX ADMIN — FLUTICASONE PROPIONATE 50 MCG: 50 SPRAY, METERED NASAL at 07:08

## 2022-08-12 RX ADMIN — CLOPIDOGREL 75 MG: 75 TABLET, FILM COATED ORAL at 08:08

## 2022-08-12 RX ADMIN — GABAPENTIN 600 MG: 300 CAPSULE ORAL at 08:08

## 2022-08-12 RX ADMIN — ISOSORBIDE MONONITRATE 60 MG: 30 TABLET, EXTENDED RELEASE ORAL at 08:08

## 2022-08-12 RX ADMIN — CETIRIZINE HYDROCHLORIDE 10 MG: 10 TABLET, FILM COATED ORAL at 08:08

## 2022-08-12 RX ADMIN — MICONAZOLE NITRATE: 20 POWDER TOPICAL at 08:08

## 2022-08-12 RX ADMIN — RANOLAZINE 1000 MG: 500 TABLET, FILM COATED, EXTENDED RELEASE ORAL at 08:08

## 2022-08-12 RX ADMIN — PANTOPRAZOLE SODIUM 40 MG: 40 TABLET, DELAYED RELEASE ORAL at 08:08

## 2022-08-12 RX ADMIN — OXYBUTYNIN CHLORIDE 5 MG: 5 TABLET, EXTENDED RELEASE ORAL at 08:08

## 2022-08-12 RX ADMIN — DILTIAZEM HYDROCHLORIDE 240 MG: 120 CAPSULE, COATED, EXTENDED RELEASE ORAL at 08:08

## 2022-08-12 RX ADMIN — CYANOCOBALAMIN TAB 1000 MCG 1000 MCG: 1000 TAB at 08:08

## 2022-08-12 NOTE — PROGRESS NOTES
"[facility] Medicine Progress Note    Primary Team: [facility] Hospitalist Team B  Attending Physician: Dr. Durant  Resident: Marin  Intern: Ulisses    Subjective:      Pt was resting in bed comfortably on exam. Pt has been NPO since midnight for possible angiogram today.  Pt with no active medical complaints at this time. Denied any reoccurence of chest pain, SOB, or abdominal pain.     Objective:     Last 24 Hour Vital Signs:  BP  Min: 135/60  Max: 188/78  Temp  Av °F (36.1 °C)  Min: 96.3 °F (35.7 °C)  Max: 97.7 °F (36.5 °C)  Pulse  Av.1  Min: 69  Max: 88  Resp  Av.9  Min: 17  Max: 21  SpO2  Av.1 %  Min: 94 %  Max: 97 %  Height  Av' 4" (162.6 cm)  Min: 5' 4" (162.6 cm)  Max: 5' 4" (162.6 cm)  Weight  Av.2 kg (254 lb)  Min: 113.4 kg (250 lb)  Max: 117 kg (258 lb)  No intake/output data recorded.    Physical Examination:  General appearance: alert, appears stated age and cooperative  Head: Normocephalic, without obvious abnormality, atraumatic  Neck: no adenopathy, no carotid bruit, no JVD and thyroid not enlarged, symmetric, no tenderness/mass/nodules  Back: symmetric, no curvature. ROM normal. No CVA tenderness.  Lungs: clear to auscultation bilaterally  Heart: pt known to be in Afib with pacemaker at a rate of 69; unable to appropriately aucultate pt 2/2 to budy habitus *body*  Abdomen: soft, non-tender; bowel sounds normal; no masses,  no organomegaly  Extremities: patietn with bilateral edema extedning from the feet to mid-shin, around the ankle skin changes consistent with chrninc LE edema  Neurologic: Grossly normal      Laboratory:  Laboratory Data Reviewed: yes  Pertinent Findings:  None    Microbiology Data Reviewed: yes  Pertinent Findings:  No new cultures.    Other Results:  EKG (my interpretation): Ventricular-paced rhythm at a rate of 69    Radiology Data Reviewed: yes  Pertinent Findings:  X-Ray: No acute radiographic findings in the chest.    Current Medications:     " Infusions:       Scheduled:   atorvastatin  10 mg Oral Daily    carvediloL  6.25 mg Oral BID    cetirizine  10 mg Oral Daily    clopidogreL  75 mg Oral Daily    cyanocobalamin  1,000 mcg Oral Daily    diltiaZEM  240 mg Oral Daily    DULoxetine  20 mg Oral Daily    fluticasone propionate  1 spray Each Nostril QAM    furosemide  40 mg Oral Daily    gabapentin  600 mg Oral TID    isosorbide mononitrate  60 mg Oral Daily    LORazepam  0.5 mg Oral QAM    losartan  25 mg Oral Daily    miconazole NITRATE 2 %   Topical (Top) BID    oxybutynin  5 mg Oral Every other day    pantoprazole  40 mg Oral Daily    ranolazine  1,000 mg Oral BID        PRN:  albuterol sulfate, bisacodyL, nitroGLYCERIN, ondansetron, sars-cov-2 (covid-19), sodium chloride 0.9%      Assessment:     Adriana Strong is a 77 y.o.female with Adriana Strong is a 77 y.o. female with mesenteric artery insufficiency s/p stent placement, TAVR (~5years ago), pacemaker placed in ~Aug 2021, A fib, CHF (EF 60-65%), HTN, HLD, and DMT2 who presented to the ED today with complaints of CP x1 day.  Pt's pain was unrelieved by nitroglycering or rest.  In the ED pt initial troponin was negative and ECG showed no sign of acute ischemia. At this point medicine was consulted for further care.     Plan:       #Chest Pain possibly 2/2 to CAD  -pt with complaints of chest pain that had resolved during interview  -troponin neg x2; BNP: 269  Plan:  -cardiology consulted  -continue Plavix, Fenofibrae, statin, ARB, BB, Ranexa at this time  -troponin negative x3  -plan for cardiology follow up outpatient with Dr. Mariee and PET Stress at that time  -Continue home eliquis and Plavix     #A. fib  -confirmed via ECG, has pacemaker inplace  -Continue home COREG at this time     #Aortic Stenosis  -s/p TAVR     #Essential Hypertension  -BP elevated; 145/72  -Resume home carvedilol 6.25, losartan, isosorbide mononitrate 60mg, and diltiazem 240 mg   -will up-titrate for  blood pressures systolic >180     #HLD  -repeat lipid panel pending  -continue home atorvastatin 10 mg      #DMT2  -HbA1C pending; glucose on admit 140  -continue home Metformin 500 mg daily    Vladimir Gtz MD  [facility] Internal Medicine HO-1    [facility] Medicine Hospitalist Pager numbers:   [facility] Hospitalist Medicine Team A (Rajinder/Isis): [phone]  [facility] Hospitalist Medicine Team B (Antoni/Carleen):  [phone]

## 2022-08-12 NOTE — PROGRESS NOTES
08/12/22 1526   AVS Confirmation   Discharge instructions and AVS given to and reviewed with patient and/or significant other. Yes   AVS printed and handed to patient by bedside nurse. VN reviewed discharge instructions with patient and  using teachback method.  Allowed time for questions, all questions answered.  Patient and  verbalized complete understanding of discharge instructions and voices no concerns.    Discharge instructions complete. Awaiting bedside delivery of medications and covid booster vaccine.  Bedside nurse notified.

## 2022-08-12 NOTE — SUBJECTIVE & OBJECTIVE
Past Medical History:   Diagnosis Date    Acute on chronic diastolic (congestive) heart failure 11/20/2019    Anticoagulant long-term use     on Plavix since May 2015    Anxiety     Arthritis     Asthma     Atrial fibrillation     Atrial fibrillation with RVR 10/19/2020    Cataracts, bilateral     Chest pain, atypical     Chronic atrial fibrillation with rapid ventricular response 6/23/2017    Colon polyp     Coronary artery disease     Diabetes mellitus     Dry eyes     Esophageal erosions     GERD (gastroesophageal reflux disease)     Heart murmur     Hemorrhoid     High cholesterol     Hypertension     Irritable bowel syndrome     Paroxysmal atrial fibrillation 10/30/2020    Persistent atrial fibrillation 2/10/2020    Shingles 2015    Stenosis of aortic and mitral valves     Stroke     TIA (transient ischemic attack)     Use of cane as ambulatory aid        Past Surgical History:   Procedure Laterality Date    ABDOMINAL SURGERY      stents to SMA    angiocele      2007, 2014    AORTIC VALVE REPLACEMENT N/A 11/19/2019    Procedure: Replacement-valve-aortic;  Surgeon: Jack Capone MD;  Location: [facility] CATH LAB;  Service: Cardiology;  Laterality: N/A;    BREAST SURGERY      left--- a lump--- no cancer    CARDIAC VALVE SURGERY      COLONOSCOPY  2014    COLONOSCOPY N/A 3/14/2018    Procedure: COLONOSCOPY;  Surgeon: Efren Kemp MD;  Location: [facility] ENDO ([address] [place] [address]);  Service: Endoscopy;  Laterality: N/A;  ok to hold Plavix 5 days prior to procedure per Dr RESHMA Hernandez     ok per Dr Kemp to hold Eliquis 2 days prior to procedure (see telephone encounter dated-2/7/18)    HERNIA REPAIR      HYSTERECTOMY  partial    1982 partial hysterectomy    LEFT HEART CATHETERIZATION Right 11/5/2019    Procedure: Left heart cath;  Surgeon: Jack Capone MD;  Location: [facility] CATH LAB;  Service: Cardiology;  Laterality: Right;    left nasal polyp      left neck lymph node      nose polyp      right hip fatty mass tissue      stent to small  intestine      SUPERIOR VENA CAVA ANGIOPLASTY / STENTING      TONSILLECTOMY      TOTAL ABDOMINAL HYSTERECTOMY      2014    TOTAL KNEE ARTHROPLASTY Bilateral     TREATMENT OF CARDIAC ARRHYTHMIA N/A 2/10/2020    Procedure: CARDIOVERSION;  Surgeon: Jayy Parrish MD;  Location: [facility] EP LAB;  Service: Cardiology;  Laterality: N/A;  AF, PEDRO, DCCV, MAC, DM, 3 Prep    TREATMENT OF CARDIAC ARRHYTHMIA N/A 5/15/2020    Procedure: CARDIOVERSION;  Surgeon: Hany Bee MD;  Location: [facility] EP LAB;  Service: Cardiology;  Laterality: N/A;  AF, PEDRO, DCCV, MAC, DM, 3 Prep    UPPER GASTROINTESTINAL ENDOSCOPY  2014       Review of patient's allergies indicates:   Allergen Reactions    Celebrex [celecoxib] Shortness Of Breath    Ciprofloxacin Swelling     lip    Dicyclomine Hives    Adhesive Dermatitis    Avelox [moxifloxacin] Swelling    Cilostazol Other (See Comments)     Elevates blood pressure    Eryc [erythromycin] Other (See Comments)     unknown    Iodine and iodide containing products Hives    Keflex [cephalexin] Hives    Meclomen      rashes    Meloxicam      Ear ringing    Metoclopramide Other (See Comments)     High blood pressure    Sulfa (sulfonamide antibiotics) Itching       Current Facility-Administered Medications on File Prior to Encounter   Medication    0.9%  NaCl infusion    sodium chloride 0.9% flush 5 mL     Current Outpatient Medications on File Prior to Encounter   Medication Sig    acetaminophen (TYLENOL) 650 MG TbSR Take 1,300 mg by mouth 2 (two) times daily.    ascorbic acid, vitamin C, (VITAMIN C) 500 MG tablet Take 1,000 mg by mouth once daily.     atorvastatin (LIPITOR) 10 MG tablet Take 10 mg by mouth once daily.    bisacodyl (DULCOLAX) 5 mg EC tablet Take 5 mg by mouth daily as needed for Constipation.    calcium carbonate/vitamin D3 (CALTRATE 600 + D ORAL) Take 1 tablet by mouth once daily.    carvediloL (COREG) 6.25 MG tablet Take 6.25 mg by mouth 2 (two) times daily.    clopidogreL (PLAVIX) 75  mg tablet Take 75 mg by mouth once daily.    cyanocobalamin (VITAMIN B-12) 1000 MCG tablet Take 1,000 mcg by mouth once daily.    diltiaZEM (CARDIZEM CD) 240 MG 24 hr capsule Take 240 mg by mouth once daily.    DULoxetine (CYMBALTA) 20 MG capsule TAKE 1 CAPSULE BY MOUTH EVERY DAY (Patient taking differently: Take 20 mg by mouth once daily.)    ELIQUIS 5 mg Tab TAKE 1 TABLET BY MOUTH TWICE A DAY (Patient taking differently: Take 5 mg by mouth 2 (two) times daily.)    fexofenadine (ALLEGRA) 60 MG tablet Take 180 mg by mouth every morning.     fish oil-omega-3 fatty acids 300-1,000 mg capsule Take 1 g by mouth once daily.     fluticasone (FLONASE) 50 mcg/actuation nasal spray 1 spray by Each Nare route every morning.     furosemide (LASIX) 40 MG tablet TAKE 1 TABLET BY MOUTH EVERY DAY (Patient taking differently: Take 40 mg by mouth 2 (two) times a day.)    gabapentin (NEURONTIN) 300 MG capsule Take 2 capsules (600 mg total) by mouth 3 (three) times daily.    isosorbide mononitrate (IMDUR) 60 MG 24 hr tablet Take 60 mg by mouth once daily.    levalbuterol (XOPENEX) 1.25 mg/3 mL nebulizer solution SMARTSI Vial(s) Via Nebulizer    lorazepam (ATIVAN) 0.5 MG tablet Take 0.5 mg by mouth every morning.     losartan (COZAAR) 25 MG tablet Take 25 mg by mouth once daily.    metFORMIN (GLUCOPHAGE) 500 MG tablet Take 500 mg by mouth 2 (two) times daily.    montelukast (SINGULAIR) 10 mg tablet Take 10 mg by mouth once daily.    mv-mn/folic ac/calcium/vit K1 (WOMEN'S 50 PLUS MULTIVITAMIN ORAL) Take 1 tablet by mouth once daily.    nitroGLYCERIN (NITROSTAT) 0.4 MG SL tablet Place 0.4 mg under the tongue every 5 (five) minutes as needed for Chest pain.    nystatin (MYCOSTATIN) powder Apply topically every 8 (eight) hours.    ondansetron (ZOFRAN-ODT) 4 MG TbDL Take 4 mg by mouth every 8 (eight) hours as needed (nausea).    oxybutynin (DITROPAN-XL) 5 MG TR24 Take 5 mg by mouth every other day.     pantoprazole (PROTONIX) 40 MG  tablet TAKE 1 TABLET BY MOUTH EVERY DAY (Patient taking differently: Take 40 mg by mouth once daily.)    POLYSORBATE 80/GLYCERIN (REFRESH DRY EYE THERAPY OPHT) Apply to eye 2 (two) times daily.    potassium gluconate 550 mg (90 mg) Tab Take 180 mg by mouth 2 (two) times a day.    predniSONE (DELTASONE) 20 MG tablet Take 40 mg by mouth once daily.    ranolazine (RANEXA) 500 MG Tb12 Take 500 mg by mouth 2 (two) times daily.    SYMBICORT 160-4.5 mcg/actuation HFAA Inhale 1 puff into the lungs 2 (two) times daily.    vitamin D (VITAMIN D3) 1000 units Tab Take 2,000 Units by mouth once daily.    albuterol (PROVENTIL) 2.5 mg /3 mL (0.083 %) nebulizer solution USE 1 VIAL IN NEBULIZER EVERY 4 6 HOURS AS NEEDED    albuterol-ipratropium (DUO-NEB) 2.5 mg-0.5 mg/3 mL nebulizer solution NEBULIZE 1 VIAL EVERY 4 HOURS AS NEEDED    mupirocin (BACTROBAN) 2 % ointment Apply topically 2 (two) times daily.     Family History       Problem Relation (Age of Onset)    Heart attack Mother    Heart failure Brother    Stroke Father          Tobacco Use    Smoking status: Never Smoker    Smokeless tobacco: Never Used   Substance and Sexual Activity    Alcohol use: No    Drug use: No    Sexual activity: Not Currently     Review of Systems   Constitutional: Negative. Negative for diaphoresis.   HENT: Negative.     Eyes: Negative.    Cardiovascular:  Positive for dyspnea on exertion and leg swelling. Negative for chest pain, near-syncope, orthopnea, palpitations, paroxysmal nocturnal dyspnea and syncope.   Respiratory: Negative.  Negative for shortness of breath.    Endocrine: Negative.    Hematologic/Lymphatic: Negative.    Skin: Negative.    Musculoskeletal:  Positive for arthritis.   Gastrointestinal:  Negative for nausea and vomiting.   Genitourinary: Negative.    Neurological:  Positive for dizziness.   Psychiatric/Behavioral:  Positive for memory loss.    Allergic/Immunologic: Negative.    Objective:     Vital Signs (Most Recent):  Temp:  96.3 °F (35.7 °C) (08/12/22 0824)  Pulse: 69 (08/12/22 0824)  Resp: 18 (08/12/22 0824)  BP: (!) 188/78 (08/12/22 0824)  SpO2: 95 % (08/12/22 0824)   Vital Signs (24h Range):  Temp:  [96.3 °F (35.7 °C)-97.7 °F (36.5 °C)] 96.3 °F (35.7 °C)  Pulse:  [69-88] 69  Resp:  [17-21] 18  SpO2:  [94 %-97 %] 95 %  BP: (135-188)/(60-82) 188/78     Weight: 117 kg (258 lb)  Body mass index is 44.29 kg/m².    SpO2: 95 %  O2 Device (Oxygen Therapy): room air    No intake or output data in the 24 hours ending 08/12/22 0954    Lines/Drains/Airways       Peripheral Intravenous Line  Duration                  Peripheral IV - Single Lumen 08/11/22 0857 20 G Left Forearm 1 day                    Physical Exam  Constitutional:       General: She is not in acute distress.     Appearance: She is obese. She is not diaphoretic.   HENT:      Head: Atraumatic.   Eyes:      General:         Right eye: No discharge.         Left eye: No discharge.   Cardiovascular:      Rate and Rhythm: Normal rate and regular rhythm.      Heart sounds: Murmur heard.   Pulmonary:      Effort: Pulmonary effort is normal.      Breath sounds: No rales.   Abdominal:      General: Bowel sounds are normal.      Palpations: Abdomen is soft.   Musculoskeletal:      Right lower leg: Edema present.      Left lower leg: Edema present.   Skin:     General: Skin is warm and dry.      Capillary Refill: Capillary refill takes 2 to 3 seconds.   Neurological:      Mental Status: She is alert. Mental status is at baseline.       Significant Labs: BMP:   Recent Labs   Lab 08/11/22  1023 08/11/22  1428 08/12/22  0311   *  --  119*     --  140   K 4.3  --  3.9   CL 95  --  98   CO2 27  --  31*   BUN 19  --  26*   CREATININE 0.8  --  0.8   CALCIUM 9.9  --  9.4   MG  --  1.5*  --      , CMP   Recent Labs   Lab 08/11/22  1023 08/12/22  0311    140   K 4.3 3.9   CL 95 98   CO2 27 31*   * 119*   BUN 19 26*   CREATININE 0.8 0.8   CALCIUM 9.9 9.4   PROT 7.7 6.9    ALBUMIN 4.1 3.7   BILITOT 0.5 0.4   ALKPHOS 44* 37*   AST 17 16   ALT 21 21   ANIONGAP 16 11     , CBC   Recent Labs   Lab 08/11/22  0901 08/12/22  0311   WBC 8.38 10.54   HGB 13.2 12.6   HCT 39.7 38.2    239     , INR No results for input(s): INR, PROTIME in the last 48 hours., Lipid Panel   Recent Labs   Lab 08/11/22  1428   CHOL 168   HDL 69   LDLCALC 87.0   TRIG 60   CHOLHDL 41.1     , Troponin   Recent Labs   Lab 08/11/22  0901 08/11/22  1331 08/11/22  1428   TROPONINI <0.006 0.010 0.006     , and All pertinent lab results from the last 24 hours have been reviewed.    Significant Imaging: Echocardiogram: Transthoracic echo (TTE) complete (Cupid Only):   Results for orders placed or performed during the hospital encounter of 08/11/22   Echo   Result Value Ref Range    BSA 2.26 m2    TDI SEPTAL 0.06 m/s    LV LATERAL E/E' RATIO 19.71 m/s    LV SEPTAL E/E' RATIO 23.00 m/s    LA WIDTH 4.00 cm    AORTIC VALVE CUSP SEPERATION 1.39 cm    TDI LATERAL 0.07 m/s    PV PEAK VELOCITY 0.87 cm/s    LVIDd 4.80 3.5 - 6.0 cm    IVS 0.97 0.6 - 1.1 cm    Posterior Wall 0.90 (A) 0.6 - 1.1 cm    Ao root annulus 2.50 cm    LVIDs 2.00 (A) 2.1 - 4.0 cm    FS 58 28 - 44 %    LA volume 101.56 cm3    LV mass 155.48 g    LA size 5.00 cm    RVDD 2.35 cm    TAPSE 1.50 cm    Left Ventricle Relative Wall Thickness 0.38 cm    AV mean gradient 8 mmHg    AV valve area 2.83 cm2    AV Velocity Ratio 0.63     AV index (prosthetic) 0.68     MV mean gradient 1 mmHg    MV valve area p 1/2 method 5.19 cm2    MV valve area by continuity eq 3.88 cm2    E/A ratio 2.88     Mean e' 0.07 m/s    E wave deceleration time 146.05 msec    IVRT 74.22 msec    LVOT diameter 2.30 cm    LVOT area 4.2 cm2    LVOT peak mazin 1.22 m/s    LVOT peak VTI 27.63 cm    Ao peak mazin 1.95 m/s    Ao VTI 40.49 cm    Mr max mazin 0.04 m/s    LVOT stroke volume 114.74 cm3    AV peak gradient 15 mmHg    MV peak gradient 6 mmHg    E/E' ratio 21.23 m/s    MV Peak E Mazin 1.38 m/s     TR Max Mazin 3.54 m/s    MV VTI 29.59 cm    MV stenosis pressure 1/2 time 42.36 ms    MV Peak A Mazin 0.48 m/s    LV Systolic Volume 73.34 mL    LV Systolic Volume Index 34.1 mL/m2    LV Diastolic Volume 123.09 mL    LV Diastolic Volume Index 57.25 mL/m2    LA Volume Index 47.2 mL/m2    LV Mass Index 72 g/m2    Left Atrium Minor Axis 6.30 cm    Left Atrium Major Axis 5.68 cm    Triscuspid Valve Regurgitation Peak Gradient 50 mmHg    LA Volume Index (Mod) 28.6 mL/m2    LA volume (mod) 61.58 cm3    Right Atrial Pressure (from IVC) 3 mmHg    EF 55 %    Right ventricular length in diastole (apical 4-chamber view) 5.60 cm    TV rest pulmonary artery pressure 53 mmHg    RA Major Axis 5.10 cm    RA Width 2.70 cm    Narrative    · The left ventricle is normal in size with normal systolic function.  · The estimated ejection fraction is 55%.  · Grade III left ventricular diastolic dysfunction.  · Normal right ventricular size with normal right ventricular systolic   function.  · There is abnormal septal wall motion consistent with right ventricular   pacemaker.  · Moderate left atrial enlargement.  · There is a 26 mm S3 transcutaneously-placed aortic bioprosthesis   present. There is no aortic insufficiency present. Prosthetic aortic valve   is normal.  · The aortic valve mean gradient is 8 mmHg with a dimensionless index of   0.68.  · The estimated PA systolic pressure is 53 mmHg.  · Normal central venous pressure (3 mmHg).  · There is pulmonary hypertension.

## 2022-08-12 NOTE — ASSESSMENT & PLAN NOTE
EKG paced   Troponin negative      [facility] 2019 prior to TAVR:  1.  The LVH made coronary angiography very difficult.  A 10 cc was required for coronary angiograms.  2.  The left main was free of disease.  It was very short making cannulation of the LAD difficulty.  3.  The LAD had a long tubular stenosis of 70% at the diagonal bifurcation.  The diagonal was a larger vessel than the LAD making intervention on the LAD suboptimal.  4.  The left circumflex was normal.  5.  The right coronary artery was normal.    Patient reports [facility] in [place] without intervention in 2021  Continue Plavix, fenofibrate, statin, ARB, BB, Ranexa   No asa given risk of bleeding with triple therapy    TTE unremarkable   Antianginals up titrated   Cleared for DC from CV perspective   PET stress as outpatient and follow up with primary cardiologist

## 2022-08-12 NOTE — ASSESSMENT & PLAN NOTE
TTE  · The left ventricle is normal in size with normal systolic function.  · The estimated ejection fraction is 55%.  · Grade III left ventricular diastolic dysfunction.  · Normal right ventricular size with normal right ventricular systolic function.  · There is abnormal septal wall motion consistent with right ventricular pacemaker.  · Moderate left atrial enlargement.  · There is a 26 mm S3 transcutaneously-placed aortic bioprosthesis present. There is no aortic insufficiency present. Prosthetic aortic valve is normal.  · The aortic valve mean gradient is 8 mmHg with a dimensionless index of 0.68.  · The estimated PA systolic pressure is 53 mmHg.  · Normal central venous pressure (3 mmHg).  · There is pulmonary hypertension.        . Continue Beta Blocker, ACE/ARB and Nitrate/Vasodilator and monitor clinical status closely. Monitor on telemetry. Patient is on CHF pathway.  Monitor strict Is&Os and daily weights.  Place on fluid restriction of 2 L. Continue to stress to patient importance of self efficacy and  on diet for CHF.   Continue p.o. Lasix

## 2022-08-12 NOTE — PROGRESS NOTES
[facility] - Telemetry  Cardiology  Progress Note    Patient Name: Adriana Strong  MRN: [id number]  Admission Date: 8/11/2022  Hospital Length of Stay: 0 days  Code Status: Full Code   Attending Physician: Jarrod Corrales MD   Primary Care Physician: Raquel Farrell MD  Expected Discharge Date: 8/13/2022  Principal Problem:<principal problem not specified>    Subjective:     Hospital Course:   08/11/2022 TTE pending   08/12/2022 TTE  · The left ventricle is normal in size with normal systolic function.  · The estimated ejection fraction is 55%.  · Grade III left ventricular diastolic dysfunction.  · Normal right ventricular size with normal right ventricular systolic function.  · There is abnormal septal wall motion consistent with right ventricular pacemaker.  · Moderate left atrial enlargement.  · There is a 26 mm S3 transcutaneously-placed aortic bioprosthesis present. There is no aortic insufficiency present. Prosthetic aortic valve is normal.  · The aortic valve mean gradient is 8 mmHg with a dimensionless index of 0.68.  · The estimated PA systolic pressure is 53 mmHg.  · Normal central venous pressure (3 mmHg).  · There is pulmonary hypertension.    Troponin remains negative. Antianginals up titrated. No chest pain reported overnight       Past Medical History:   Diagnosis Date    Acute on chronic diastolic (congestive) heart failure 11/20/2019    Anticoagulant long-term use     on Plavix since May 2015    Anxiety     Arthritis     Asthma     Atrial fibrillation     Atrial fibrillation with RVR 10/19/2020    Cataracts, bilateral     Chest pain, atypical     Chronic atrial fibrillation with rapid ventricular response 6/23/2017    Colon polyp     Coronary artery disease     Diabetes mellitus     Dry eyes     Esophageal erosions     GERD (gastroesophageal reflux disease)     Heart murmur     Hemorrhoid     High cholesterol     Hypertension     Irritable bowel syndrome     Paroxysmal atrial  fibrillation 10/30/2020    Persistent atrial fibrillation 2/10/2020    Shingles 2015    Stenosis of aortic and mitral valves     Stroke     TIA (transient ischemic attack)     Use of cane as ambulatory aid        Past Surgical History:   Procedure Laterality Date    ABDOMINAL SURGERY      stents to SMA    angiocele      2007, 2014    AORTIC VALVE REPLACEMENT N/A 11/19/2019    Procedure: Replacement-valve-aortic;  Surgeon: Jack Capone MD;  Location: [facility] CATH LAB;  Service: Cardiology;  Laterality: N/A;    BREAST SURGERY      left--- a lump--- no cancer    CARDIAC VALVE SURGERY      COLONOSCOPY  2014    COLONOSCOPY N/A 3/14/2018    Procedure: COLONOSCOPY;  Surgeon: Efren Kemp MD;  Location: [facility] ENDO ([address] [place] [address]);  Service: Endoscopy;  Laterality: N/A;  ok to hold Plavix 5 days prior to procedure per Dr RESHMA Hernandez     ok per Dr Kemp to hold Eliquis 2 days prior to procedure (see telephone encounter dated-2/7/18)    HERNIA REPAIR      HYSTERECTOMY  partial    1982 partial hysterectomy    LEFT HEART CATHETERIZATION Right 11/5/2019    Procedure: Left heart cath;  Surgeon: Jack Capone MD;  Location: [facility] CATH LAB;  Service: Cardiology;  Laterality: Right;    left nasal polyp      left neck lymph node      nose polyp      right hip fatty mass tissue      stent to small intestine      SUPERIOR VENA CAVA ANGIOPLASTY / STENTING      TONSILLECTOMY      TOTAL ABDOMINAL HYSTERECTOMY      2014    TOTAL KNEE ARTHROPLASTY Bilateral     TREATMENT OF CARDIAC ARRHYTHMIA N/A 2/10/2020    Procedure: CARDIOVERSION;  Surgeon: Jayy Parrish MD;  Location: [facility] EP LAB;  Service: Cardiology;  Laterality: N/A;  AF, PEDRO, DCCV, MAC, DM, 3 Prep    TREATMENT OF CARDIAC ARRHYTHMIA N/A 5/15/2020    Procedure: CARDIOVERSION;  Surgeon: Hany Bee MD;  Location: [facility] EP LAB;  Service: Cardiology;  Laterality: N/A;  AF, PEDRO, DCCV, MAC, DM, 3 Prep    UPPER GASTROINTESTINAL ENDOSCOPY  2014       Review of  patient's allergies indicates:   Allergen Reactions    Celebrex [celecoxib] Shortness Of Breath    Ciprofloxacin Swelling     lip    Dicyclomine Hives    Adhesive Dermatitis    Avelox [moxifloxacin] Swelling    Cilostazol Other (See Comments)     Elevates blood pressure    Eryc [erythromycin] Other (See Comments)     unknown    Iodine and iodide containing products Hives    Keflex [cephalexin] Hives    Meclomen      rashes    Meloxicam      Ear ringing    Metoclopramide Other (See Comments)     High blood pressure    Sulfa (sulfonamide antibiotics) Itching       Current Facility-Administered Medications on File Prior to Encounter   Medication    0.9%  NaCl infusion    sodium chloride 0.9% flush 5 mL     Current Outpatient Medications on File Prior to Encounter   Medication Sig    acetaminophen (TYLENOL) 650 MG TbSR Take 1,300 mg by mouth 2 (two) times daily.    ascorbic acid, vitamin C, (VITAMIN C) 500 MG tablet Take 1,000 mg by mouth once daily.     atorvastatin (LIPITOR) 10 MG tablet Take 10 mg by mouth once daily.    bisacodyl (DULCOLAX) 5 mg EC tablet Take 5 mg by mouth daily as needed for Constipation.    calcium carbonate/vitamin D3 (CALTRATE 600 + D ORAL) Take 1 tablet by mouth once daily.    carvediloL (COREG) 6.25 MG tablet Take 6.25 mg by mouth 2 (two) times daily.    clopidogreL (PLAVIX) 75 mg tablet Take 75 mg by mouth once daily.    cyanocobalamin (VITAMIN B-12) 1000 MCG tablet Take 1,000 mcg by mouth once daily.    diltiaZEM (CARDIZEM CD) 240 MG 24 hr capsule Take 240 mg by mouth once daily.    DULoxetine (CYMBALTA) 20 MG capsule TAKE 1 CAPSULE BY MOUTH EVERY DAY (Patient taking differently: Take 20 mg by mouth once daily.)    ELIQUIS 5 mg Tab TAKE 1 TABLET BY MOUTH TWICE A DAY (Patient taking differently: Take 5 mg by mouth 2 (two) times daily.)    fexofenadine (ALLEGRA) 60 MG tablet Take 180 mg by mouth every morning.     fish oil-omega-3 fatty acids 300-1,000 mg capsule  Take 1 g by mouth once daily.     fluticasone (FLONASE) 50 mcg/actuation nasal spray 1 spray by Each Nare route every morning.     furosemide (LASIX) 40 MG tablet TAKE 1 TABLET BY MOUTH EVERY DAY (Patient taking differently: Take 40 mg by mouth 2 (two) times a day.)    gabapentin (NEURONTIN) 300 MG capsule Take 2 capsules (600 mg total) by mouth 3 (three) times daily.    isosorbide mononitrate (IMDUR) 60 MG 24 hr tablet Take 60 mg by mouth once daily.    levalbuterol (XOPENEX) 1.25 mg/3 mL nebulizer solution SMARTSI Vial(s) Via Nebulizer    lorazepam (ATIVAN) 0.5 MG tablet Take 0.5 mg by mouth every morning.     losartan (COZAAR) 25 MG tablet Take 25 mg by mouth once daily.    metFORMIN (GLUCOPHAGE) 500 MG tablet Take 500 mg by mouth 2 (two) times daily.    montelukast (SINGULAIR) 10 mg tablet Take 10 mg by mouth once daily.    mv-mn/folic ac/calcium/vit K1 (WOMEN'S 50 PLUS MULTIVITAMIN ORAL) Take 1 tablet by mouth once daily.    nitroGLYCERIN (NITROSTAT) 0.4 MG SL tablet Place 0.4 mg under the tongue every 5 (five) minutes as needed for Chest pain.    nystatin (MYCOSTATIN) powder Apply topically every 8 (eight) hours.    ondansetron (ZOFRAN-ODT) 4 MG TbDL Take 4 mg by mouth every 8 (eight) hours as needed (nausea).    oxybutynin (DITROPAN-XL) 5 MG TR24 Take 5 mg by mouth every other day.     pantoprazole (PROTONIX) 40 MG tablet TAKE 1 TABLET BY MOUTH EVERY DAY (Patient taking differently: Take 40 mg by mouth once daily.)    POLYSORBATE 80/GLYCERIN (REFRESH DRY EYE THERAPY OPHT) Apply to eye 2 (two) times daily.    potassium gluconate 550 mg (90 mg) Tab Take 180 mg by mouth 2 (two) times a day.    predniSONE (DELTASONE) 20 MG tablet Take 40 mg by mouth once daily.    ranolazine (RANEXA) 500 MG Tb12 Take 500 mg by mouth 2 (two) times daily.    SYMBICORT 160-4.5 mcg/actuation HFAA Inhale 1 puff into the lungs 2 (two) times daily.    vitamin D (VITAMIN D3) 1000 units Tab Take 2,000 Units by  mouth once daily.    albuterol (PROVENTIL) 2.5 mg /3 mL (0.083 %) nebulizer solution USE 1 VIAL IN NEBULIZER EVERY 4 6 HOURS AS NEEDED    albuterol-ipratropium (DUO-NEB) 2.5 mg-0.5 mg/3 mL nebulizer solution NEBULIZE 1 VIAL EVERY 4 HOURS AS NEEDED    mupirocin (BACTROBAN) 2 % ointment Apply topically 2 (two) times daily.     Family History       Problem Relation (Age of Onset)    Heart attack Mother    Heart failure Brother    Stroke Father          Tobacco Use    Smoking status: Never Smoker    Smokeless tobacco: Never Used   Substance and Sexual Activity    Alcohol use: No    Drug use: No    Sexual activity: Not Currently     Review of Systems   Constitutional: Negative. Negative for diaphoresis.   HENT: Negative.     Eyes: Negative.    Cardiovascular:  Positive for dyspnea on exertion and leg swelling. Negative for chest pain, near-syncope, orthopnea, palpitations, paroxysmal nocturnal dyspnea and syncope.   Respiratory: Negative.  Negative for shortness of breath.    Endocrine: Negative.    Hematologic/Lymphatic: Negative.    Skin: Negative.    Musculoskeletal:  Positive for arthritis.   Gastrointestinal:  Negative for nausea and vomiting.   Genitourinary: Negative.    Neurological:  Positive for dizziness.   Psychiatric/Behavioral:  Positive for memory loss.    Allergic/Immunologic: Negative.    Objective:     Vital Signs (Most Recent):  Temp: 96.3 °F (35.7 °C) (08/12/22 0824)  Pulse: 69 (08/12/22 0824)  Resp: 18 (08/12/22 0824)  BP: (!) 188/78 (08/12/22 0824)  SpO2: 95 % (08/12/22 0824)   Vital Signs (24h Range):  Temp:  [96.3 °F (35.7 °C)-97.7 °F (36.5 °C)] 96.3 °F (35.7 °C)  Pulse:  [69-88] 69  Resp:  [17-21] 18  SpO2:  [94 %-97 %] 95 %  BP: (135-188)/(60-82) 188/78     Weight: 117 kg (258 lb)  Body mass index is 44.29 kg/m².    SpO2: 95 %  O2 Device (Oxygen Therapy): room air    No intake or output data in the 24 hours ending 08/12/22 0954    Lines/Drains/Airways       Peripheral Intravenous Line   Duration                  Peripheral IV - Single Lumen 08/11/22 0857 20 G Left Forearm 1 day                    Physical Exam  Constitutional:       General: She is not in acute distress.     Appearance: She is obese. She is not diaphoretic.   HENT:      Head: Atraumatic.   Eyes:      General:         Right eye: No discharge.         Left eye: No discharge.   Cardiovascular:      Rate and Rhythm: Normal rate and regular rhythm.      Heart sounds: Murmur heard.   Pulmonary:      Effort: Pulmonary effort is normal.      Breath sounds: No rales.   Abdominal:      General: Bowel sounds are normal.      Palpations: Abdomen is soft.   Musculoskeletal:      Right lower leg: Edema present.      Left lower leg: Edema present.   Skin:     General: Skin is warm and dry.      Capillary Refill: Capillary refill takes 2 to 3 seconds.   Neurological:      Mental Status: She is alert. Mental status is at baseline.       Significant Labs: BMP:   Recent Labs   Lab 08/11/22  1023 08/11/22  1428 08/12/22  0311   *  --  119*     --  140   K 4.3  --  3.9   CL 95  --  98   CO2 27  --  31*   BUN 19  --  26*   CREATININE 0.8  --  0.8   CALCIUM 9.9  --  9.4   MG  --  1.5*  --      , CMP   Recent Labs   Lab 08/11/22  1023 08/12/22  0311    140   K 4.3 3.9   CL 95 98   CO2 27 31*   * 119*   BUN 19 26*   CREATININE 0.8 0.8   CALCIUM 9.9 9.4   PROT 7.7 6.9   ALBUMIN 4.1 3.7   BILITOT 0.5 0.4   ALKPHOS 44* 37*   AST 17 16   ALT 21 21   ANIONGAP 16 11     , CBC   Recent Labs   Lab 08/11/22  0901 08/12/22  0311   WBC 8.38 10.54   HGB 13.2 12.6   HCT 39.7 38.2    239     , INR No results for input(s): INR, PROTIME in the last 48 hours., Lipid Panel   Recent Labs   Lab 08/11/22  1428   CHOL 168   HDL 69   LDLCALC 87.0   TRIG 60   CHOLHDL 41.1     , Troponin   Recent Labs   Lab 08/11/22  0901 08/11/22  1331 08/11/22  1428   TROPONINI <0.006 0.010 0.006     , and All pertinent lab results from the last 24 hours have  been reviewed.    Significant Imaging: Echocardiogram: Transthoracic echo (TTE) complete ([facility] Only):   Results for orders placed or performed during the hospital encounter of 08/11/22   Echo   Result Value Ref Range    BSA 2.26 m2    TDI SEPTAL 0.06 m/s    LV LATERAL E/E' RATIO 19.71 m/s    LV SEPTAL E/E' RATIO 23.00 m/s    LA WIDTH 4.00 cm    AORTIC VALVE CUSP SEPERATION 1.39 cm    TDI LATERAL 0.07 m/s    PV PEAK VELOCITY 0.87 cm/s    LVIDd 4.80 3.5 - 6.0 cm    IVS 0.97 0.6 - 1.1 cm    Posterior Wall 0.90 (A) 0.6 - 1.1 cm    Ao root annulus 2.50 cm    LVIDs 2.00 (A) 2.1 - 4.0 cm    FS 58 28 - 44 %    LA volume 101.56 cm3    LV mass 155.48 g    LA size 5.00 cm    RVDD 2.35 cm    TAPSE 1.50 cm    Left Ventricle Relative Wall Thickness 0.38 cm    AV mean gradient 8 mmHg    AV valve area 2.83 cm2    AV Velocity Ratio 0.63     AV index (prosthetic) 0.68     MV mean gradient 1 mmHg    MV valve area p 1/2 method 5.19 cm2    MV valve area by continuity eq 3.88 cm2    E/A ratio 2.88     Mean e' 0.07 m/s    E wave deceleration time 146.05 msec    IVRT 74.22 msec    LVOT diameter 2.30 cm    LVOT area 4.2 cm2    LVOT peak mazin 1.22 m/s    LVOT peak VTI 27.63 cm    Ao peak mazin 1.95 m/s    Ao VTI 40.49 cm    Mr max mazin 0.04 m/s    LVOT stroke volume 114.74 cm3    AV peak gradient 15 mmHg    MV peak gradient 6 mmHg    E/E' ratio 21.23 m/s    MV Peak E Mazin 1.38 m/s    TR Max Mazin 3.54 m/s    MV VTI 29.59 cm    MV stenosis pressure 1/2 time 42.36 ms    MV Peak A Mazin 0.48 m/s    LV Systolic Volume 73.34 mL    LV Systolic Volume Index 34.1 mL/m2    LV Diastolic Volume 123.09 mL    LV Diastolic Volume Index 57.25 mL/m2    LA Volume Index 47.2 mL/m2    LV Mass Index 72 g/m2    Left Atrium Minor Axis 6.30 cm    Left Atrium Major Axis 5.68 cm    Triscuspid Valve Regurgitation Peak Gradient 50 mmHg    LA Volume Index (Mod) 28.6 mL/m2    LA volume (mod) 61.58 cm3    Right Atrial Pressure (from IVC) 3 mmHg    EF 55 %    Right ventricular  length in diastole (apical 4-chamber view) 5.60 cm    TV rest pulmonary artery pressure 53 mmHg    RA Major Axis 5.10 cm    RA Width 2.70 cm    Narrative    · The left ventricle is normal in size with normal systolic function.  · The estimated ejection fraction is 55%.  · Grade III left ventricular diastolic dysfunction.  · Normal right ventricular size with normal right ventricular systolic   function.  · There is abnormal septal wall motion consistent with right ventricular   pacemaker.  · Moderate left atrial enlargement.  · There is a 26 mm S3 transcutaneously-placed aortic bioprosthesis   present. There is no aortic insufficiency present. Prosthetic aortic valve   is normal.  · The aortic valve mean gradient is 8 mmHg with a dimensionless index of   0.68.  · The estimated PA systolic pressure is 53 mmHg.  · Normal central venous pressure (3 mmHg).  · There is pulmonary hypertension.        Assessment and Plan:     Brief HPI: Patient seen this morning on rounds, POC discussed for PET stress as outpatient.     BMI 40.0-44.9, adult  Weight loss encouraged     CAD (coronary artery disease)  EKG paced   Troponin negative      [facility] 2019 prior to TAVR:  1.  The LVH made coronary angiography very difficult.  A 10 cc was required for coronary angiograms.  2.  The left main was free of disease.  It was very short making cannulation of the LAD difficulty.  3.  The LAD had a long tubular stenosis of 70% at the diagonal bifurcation.  The diagonal was a larger vessel than the LAD making intervention on the LAD suboptimal.  4.  The left circumflex was normal.  5.  The right coronary artery was normal.    Patient reports C in [place] without intervention in 2021  Continue Plavix, fenofibrate, statin, ARB, BB, Ranexa   No asa given risk of bleeding with triple therapy    TTE unremarkable   Antianginals up titrated   Cleared for DC from CV perspective   PET stress as outpatient and follow up with primary cardiologist     Chest  pain  Per CAD    Atrial flutter  Continue BB, paced at this time  Resume Eliquis    Acute on chronic diastolic (congestive) heart failure  TTE  · The left ventricle is normal in size with normal systolic function.  · The estimated ejection fraction is 55%.  · Grade III left ventricular diastolic dysfunction.  · Normal right ventricular size with normal right ventricular systolic function.  · There is abnormal septal wall motion consistent with right ventricular pacemaker.  · Moderate left atrial enlargement.  · There is a 26 mm S3 transcutaneously-placed aortic bioprosthesis present. There is no aortic insufficiency present. Prosthetic aortic valve is normal.  · The aortic valve mean gradient is 8 mmHg with a dimensionless index of 0.68.  · The estimated PA systolic pressure is 53 mmHg.  · Normal central venous pressure (3 mmHg).  · There is pulmonary hypertension.        . Continue Beta Blocker, ACE/ARB and Nitrate/Vasodilator and monitor clinical status closely. Monitor on telemetry. Patient is on CHF pathway.  Monitor strict Is&Os and daily weights.  Place on fluid restriction of 2 L. Continue to stress to patient importance of self efficacy and  on diet for CHF.   Continue p.o. Lasix           Aortic stenosis  S/p TAVR    Essential hypertension  SBP 130s-150s  Medications adjusted         VTE Risk Mitigation (From admission, onward)         Ordered     IP VTE HIGH RISK PATIENT  Once         08/11/22 1257     Place sequential compression device  Until discontinued         08/11/22 1257                Kris Avilez NP  Cardiology  [facility] - Telemetry

## 2022-08-12 NOTE — PLAN OF CARE
[facility] - Telemetry  Initial Discharge Assessment       Primary Care Provider: Raquel Farrell MD    Admission Diagnosis: CAD (coronary artery disease) [I25.10]  Chest pain [R07.9]    Admission Date: 8/11/2022  Expected Discharge Date: 8/13/2022    Consult: vishal    Payor: [employer] MANAGED MEDICARE / Plan: [employer] CHOICES 65 / Product Type: Medicare Advantage /     Extended Emergency Contact Information  Primary Emergency Contact: Anthony Strong  Address: [address] [place] [address] [facility]  Home Phone: [phone]  Mobile Phone: [phone]  Relation: Spouse  Secondary Emergency Contact: Tammy Kemp  Address: unknown   [place]  Mobile Phone: [phone]  Relation: Daughter    Discharge Plan A: (P) Home with family  Discharge Plan B: (P) Home Health      [id number] [place], [place] [facility] [address] [facility] [place] [address]  Phone: [phone] Fax: [phone]      Initial Assessment (most recent)       Adult Discharge Assessment - 08/12/22 1115          Discharge Assessment    Assessment Type Discharge Planning Assessment (P)      Confirmed/corrected address, phone number and insurance Yes (P)      Confirmed Demographics Correct on Facesheet (P)      Source of Information patient (P)    spouseAnthony ([phone])    Communicated MIMI with patient/caregiver Date not available/Unable to determine (P)      Lives With spouse (P)    spouseAnthony ([phone])    Do you expect to return to your current living situation? Yes (P)      Do you have help at home or someone to help you manage your care at home? Yes (P)      Prior to hospitilization cognitive status: Alert/Oriented (P)      Current cognitive status: Alert/Oriented (P)      Walking or Climbing Stairs Difficulty ambulation difficulty, requires equipment (P)      Dressing/Bathing Difficulty bathing difficulty, requires  equipment (P)      Equipment Currently Used at Home cane, straight;wheelchair;walker, rolling;grab bar;shower chair;glucometer;other (see comments) (P)    BP machine    Readmission within 30 days? No (P)      Patient currently being followed by outpatient case management? No (P)      Do you currently have service(s) that help you manage your care at home? No (P)      Do you take prescription medications? Yes (P)      Do you have prescription coverage? Yes (P)      Do you have any problems affording any of your prescribed medications? No (P)      Is the patient taking medications as prescribed? yes (P)      How do you get to doctors appointments? family or friend will provide (P)      Are you on dialysis? No (P)      Do you take coumadin? No (P)      Discharge Plan A Home with family (P)      Discharge Plan B Home Health (P)      DME Needed Upon Discharge  other (see comments) (P)    tbd    Discharge Plan discussed with: Patient;Spouse/sig other (P)                      1115  Patient resting quietly in bed with spouse, Anthony Strong ([phone]), at the bedside when CM rounded. Patient was admitted with chest pain & is being followed by [facility].    Patient lives with her spouse, has equipment with assist with all ADLs, & denied the need for assistance with transportation at time of discharge.     Patient stated that she has a previously scheduled appointment with Dr. Krzysztof Mcgraw (PCP) on 8/17/2022 at 1400 but needs assistance getting established with a new cardiologist.     CM updated patient's whiteboard with CM name & contact information.     1240  Message sent to the schedulers requesting a cardiology hospital follow up appointment. Awaiting response.     1255  DILAN was informed by  Shawanda of a cards hospfu appt scheduled for the patient with Dr. Rancho Mariee on 8/24/2022 at 1140. Information added to the patient's discharge paperwork.    1435  DC order noted. CM informed the patient via phone  ([phone]) of discharge status. Patient in agreement with plan to discharge home today, denied the need for assistance with transportation at time of discharge, & verbalized understanding regarding the hospital follow up appointment with Dr Rancho Mariee (cards) on 8/24/2022 at 1140.    Message sent to nurse Mee, virtual nurse Ramiro, & pharmacist Paul informing that the patient is cleared to discharge.      Will continue to follow.

## 2022-08-13 NOTE — PLAN OF CARE
[facility] - Telemetry  Discharge Final Note    Primary Care Provider: Raquel Farrell MD    Expected Discharge Date: 8/12/2022    Final Discharge Note (most recent)       Final Note - 08/13/22 0740          Final Note    Assessment Type Final Discharge Note (P)      Anticipated Discharge Disposition Home or Self Care (P)      Hospital Resources/Appts/Education Provided Appointments scheduled and added to AVS (P)                      Important Message from Medicare             Contact Info       Krzysztof Mcgraw MD   Specialty: Family Medicine    [address]  [address]  [place] [address]   Phone: [phone]       Next Steps: Follow up on 8/17/2022    Instructions: at 2:00pm; previously scheduled PCP appointment    Rancho Mariee MD   Specialty: Interventional Cardiology, Cardiology    [address] [facility]  [address]  [place] [address]   Phone: [phone]       Next Steps: Follow up on 8/24/2022    Instructions: at 11:40 AM; cardiolgy hospital follow up appointment

## 2022-08-13 NOTE — DISCHARGE SUMMARY
[facility] Hospital Medicine Discharge Summary    Primary Team: [facility] Hospitalist Team B  Attending Physician: Dr. Jarrod Corrales  Resident: Marin  Intern: Ulisses    Date of Admit: 8/11/2022  Date of Discharge: 8/12/2022    Discharge to: Home  Condition: Stable    Discharge Diagnoses     Patient Active Problem List   Diagnosis    Enlarged ovary    Type 2 diabetes mellitus    Severe persistent asthma with acute exacerbation    GERD (gastroesophageal reflux disease)    Chronic mesenteric ischemia    Mesenteric artery insufficiency    Gastroesophageal reflux disease    Essential hypertension    Pure hypercholesterolemia    Old lacunar stroke without late effect    COPD exacerbation    Constipation    Aortic stenosis    Umbilical hernia without obstruction and without gangrene    Incisional hernia, without obstruction or gangrene    COPD (chronic obstructive pulmonary disease)    Costochondritis    Diabetic polyneuropathy associated with type 2 diabetes mellitus    DDD (degenerative disc disease), lumbar    Cataract    Low back pain    Difficulty walking    Muscle weakness    Balance problems    Decreased range of motion of trunk and back    Lumbar spondylosis    Neuroforaminal stenosis of lumbar spine    Spinal stenosis of lumbar region with neurogenic claudication    Lumbar radiculopathy    S/P TAVR (transcatheter aortic valve replacement)    Morbid obesity    Acute on chronic diastolic (congestive) heart failure    Vasovagal syncope    TACOS (acute kidney injury)    Headache    Atrial flutter    Hyponatremia    Leukocytosis    Dysuria    Comfort measures only status    Acute cystitis without hematuria    Chest pain    CAD (coronary artery disease)    Asthma in adult    Chronic diastolic CHF (congestive heart failure)    Chronic atrial fibrillation    BMI 40.0-44.9, adult    Localized edema    History of transcatheter aortic valve replacement (TAVR)    Coronary artery disease  involving native coronary artery of native heart without angina pectoris    Aortic atherosclerosis    Numbness and tingling    Seizure-like activity       Consultants and Procedures     Consultants:  Cardiology    Procedures:   TTE    Imaging:  CXR    Brief History of Present Illness      Pt is a 77 year-old female with a PMHx of mesenteric artery insufficiency s/p stent placement, TAVR (~5years ago), pacemaker placed in ~Aug 2021, A fib, CHF (EF 60-65%), HTN, HLD, DMT2, and a stroke in 2015.      Pt reports that she was in her usual state of health until 3:30am this morning when she got up and went to the bahroom. She reports feeling a sudden shaking/pain in her bilateral upper extremities. Ms. Strong thought this would self-resolve so she got back into bed . Chest pain started about 1 hr later.  She described the pain as a R-sided 10/10 stabbing pain that lasted for an unsure period of time. It migrated to the left side and then to both arms and jaw. Chest pain was not associated with any sweats or SOB. She took 2 doses of nitro at ~7:05 and 7:10 which only provided minimal relief; at which time she came to the hospital. Pt said that she has been experiencing L-sided chest tightness at rest for roughly 1 week. Pt also endoring intermittent loss of strength when having this poorly defined chest tightness.    For the full HPI please refer to the History & Physical from this admission.    Hospital Course By Problem with Pertinent Findings       #Chest Pain possibly 2/2 to CAD  -pt with complaints of chest pain that had resolved during interview  -troponin neg x2; BNP: 269  Plan:  -pt to follow up with cardiologist Dr. Mariee and PET Stress at that time  -Continue home eliquis and Plavix      #A. fib  -confirmed via ECG, has pacemaker inplace  Plan:  -Continue home COREG 6.25 daily     #Aortic Stenosis  -s/p TAVR     #Essential Hypertension  -BP elevated; 145/72  -Resume home carvedilol 6.25, losartan, isosorbide  mononitrate 60mg, and diltiazem 240 mg      #HLD  -repeat lipid panel chol: 168, HDL: 69 Tri  -continue home atorvastatin 10 mg      #DMT2  -HbA1C 5.8; glucose on admit 140  -continue home Metformin 500 mg daily    Discharge Medications        Medication List      CHANGE how you take these medications    albuterol 2.5 mg /3 mL (0.083 %) nebulizer solution  Commonly known as: PROVENTIL  Take 3 mLs (2.5 mg total) by nebulization every 4 (four) hours as needed (wheezing or SOB).  What changed: See the new instructions.        CONTINUE taking these medications    acetaminophen 650 MG Tbsr  Commonly known as: TYLENOL     albuterol-ipratropium 2.5 mg-0.5 mg/3 mL nebulizer solution  Commonly known as: DUO-NEB     apixaban 5 mg Tab  Commonly known as: ELIQUIS  Take 1 tablet (5 mg total) by mouth 2 (two) times daily.     ascorbic acid (vitamin C) 500 MG tablet  Commonly known as: VITAMIN C     atorvastatin 10 MG tablet  Commonly known as: LIPITOR  Take 1 tablet (10 mg total) by mouth once daily.     bisacodyL 5 mg EC tablet  Commonly known as: DULCOLAX  Take 1 tablet (5 mg total) by mouth daily as needed for Constipation.     CALTRATE 600 + D ORAL     carvediloL 6.25 MG tablet  Commonly known as: COREG  Take 1 tablet (6.25 mg total) by mouth 2 (two) times daily.     clopidogreL 75 mg tablet  Commonly known as: PLAVIX  Take 1 tablet (75 mg total) by mouth once daily.     cyanocobalamin 1000 MCG tablet  Commonly known as: VITAMIN B-12  Take 1 tablet (1,000 mcg total) by mouth once daily.     diltiaZEM 240 MG 24 hr capsule  Commonly known as: CARDIZEM CD  Take 1 capsule (240 mg total) by mouth once daily.     DULoxetine 20 MG capsule  Commonly known as: CYMBALTA  Take 1 capsule (20 mg total) by mouth once daily.     fexofenadine 60 MG tablet  Commonly known as: ALLEGRA  Take 3 tablets (180 mg total) by mouth every morning.     fish oil-omega-3 fatty acids 300-1,000 mg capsule     fluticasone propionate 50 mcg/actuation  nasal spray  Commonly known as: FLONASE  1 spray (50 mcg total) by Each Nostril route every morning.     furosemide 40 MG tablet  Commonly known as: LASIX  Take 1 tablet (40 mg total) by mouth 2 (two) times a day.     gabapentin 300 MG capsule  Commonly known as: NEURONTIN  Take 2 capsules (600 mg total) by mouth 3 (three) times daily.     isosorbide mononitrate 60 MG 24 hr tablet  Commonly known as: IMDUR  Take 1 tablet (60 mg total) by mouth once daily.     levalbuterol 1.25 mg/3 mL nebulizer solution  Commonly known as: XOPENEX     LORazepam 0.5 MG tablet  Commonly known as: ATIVAN  Take 1 tablet (0.5 mg total) by mouth every morning.     losartan 25 MG tablet  Commonly known as: COZAAR  Take 1 tablet (25 mg total) by mouth once daily.     metFORMIN 500 MG tablet  Commonly known as: GLUCOPHAGE  Take 1 tablet (500 mg total) by mouth 2 (two) times daily.     montelukast 10 mg tablet  Commonly known as: SINGULAIR     nitroGLYCERIN 0.4 MG SL tablet  Commonly known as: NITROSTAT  Place 1 tablet (0.4 mg total) under the tongue every 5 (five) minutes as needed for Chest pain.     nystatin powder  Commonly known as: MYCOSTATIN  Apply topically every 8 (eight) hours.     ondansetron 4 MG Tbdl  Commonly known as: ZOFRAN-ODT  Take 1 tablet (4 mg total) by mouth every 8 (eight) hours as needed (nausea).     oxybutynin 5 MG Tr24  Commonly known as: DITROPAN-XL  Take 1 tablet (5 mg total) by mouth every other day.     pantoprazole 40 MG tablet  Commonly known as: PROTONIX  Take 1 tablet (40 mg total) by mouth once daily.     potassium gluconate 550 mg (90 mg) Tab     ranolazine 500 MG Tb12  Commonly known as: RANEXA  Take 1 tablet (500 mg total) by mouth 2 (two) times daily.     REFRESH DRY EYE THERAPY OPHT     SYMBICORT 160-4.5 mcg/actuation Hfaa  Generic drug: budesonide-formoterol 160-4.5 mcg     vitamin D 1000 units Tab  Commonly known as: VITAMIN D3     WOMEN'S 50 PLUS MULTIVITAMIN ORAL        STOP taking these  medications    mupirocin 2 % ointment  Commonly known as: BACTROBAN     predniSONE 20 MG tablet  Commonly known as: DELTASONE           Where to Get Your Medications      These medications were sent to [facility] [address] [address]    Hours: Mon-Fri, 8a-5:30p Phone: [phone]   · albuterol 2.5 mg /3 mL (0.083 %) nebulizer solution  · apixaban 5 mg Tab  · atorvastatin 10 MG tablet  · bisacodyL 5 mg EC tablet  · carvediloL 6.25 MG tablet  · clopidogreL 75 mg tablet  · cyanocobalamin 1000 MCG tablet  · diltiaZEM 240 MG 24 hr capsule  · DULoxetine 20 MG capsule  · fexofenadine 60 MG tablet  · fluticasone propionate 50 mcg/actuation nasal spray  · furosemide 40 MG tablet  · gabapentin 300 MG capsule  · isosorbide mononitrate 60 MG 24 hr tablet  · losartan 25 MG tablet  · metFORMIN 500 MG tablet  · nitroGLYCERIN 0.4 MG SL tablet  · ondansetron 4 MG Tbdl  · oxybutynin 5 MG Tr24  · pantoprazole 40 MG tablet  · ranolazine 500 MG Tb12     You can get these medications from any pharmacy    Bring a paper prescription for each of these medications  · LORazepam 0.5 MG tablet  · nystatin powder         Discharge Information:   Diet:  Low Fat/Low Salt DASH Diet    Physical Activity:  As tolerated             Instructions:  1. Take all medications as prescribed  2. Keep all follow-up appointments  3. Return to the hospital or call your primary care physicians if any worsening symptoms such as fever, chest pain, shortness of breath, return of symptoms, or any other concerns.    Follow-Up Appointments:  Cardiologist  PCP    Vladimir Gtz MD  [facility] Internal Medicine, HO-1

## 2022-08-23 ENCOUNTER — TELEPHONE (OUTPATIENT)
Dept: CARDIOLOGY | Facility: CLINIC | Age: 77
End: 2022-08-23
Payer: MEDICARE

## 2022-08-23 NOTE — TELEPHONE ENCOUNTER
I reached out to patient today and spoke to her ,    I couldn't hear quite well what she was saying     Due to she was driving and her voice was going     in and out ,I couldn't hear a word.    I did hear her say that she has an appointment     Tomorrow 8-24-22  with  in [facility]..        Nw                                                ----- Message from Lissette Valencia sent at 8/22/2022  5:36 PM CDT -----  Needs advice from nurse:      Who Called:pt  Regarding:patient needs to discuss supplies due to being allergic to dye  Would the patient rather a call back or [facility] [employer]?  Best Call Back number:[phone]  Additional Info:

## 2022-08-24 ENCOUNTER — OFFICE VISIT (OUTPATIENT)
Dept: CARDIOLOGY | Facility: CLINIC | Age: 77
End: 2022-08-24
Payer: MEDICARE

## 2022-08-24 VITALS
WEIGHT: 240 LBS | HEIGHT: 64 IN | SYSTOLIC BLOOD PRESSURE: 126 MMHG | HEART RATE: 69 BPM | BODY MASS INDEX: 40.97 KG/M2 | DIASTOLIC BLOOD PRESSURE: 69 MMHG

## 2022-08-24 DIAGNOSIS — E78.00 PURE HYPERCHOLESTEROLEMIA: ICD-10-CM

## 2022-08-24 DIAGNOSIS — R60.0 LOCALIZED EDEMA: ICD-10-CM

## 2022-08-24 DIAGNOSIS — Z95.2 S/P TAVR (TRANSCATHETER AORTIC VALVE REPLACEMENT): ICD-10-CM

## 2022-08-24 DIAGNOSIS — I70.0 AORTIC ATHEROSCLEROSIS: ICD-10-CM

## 2022-08-24 DIAGNOSIS — R07.89 OTHER CHEST PAIN: Primary | ICD-10-CM

## 2022-08-24 DIAGNOSIS — J42 CHRONIC BRONCHITIS, UNSPECIFIED CHRONIC BRONCHITIS TYPE: ICD-10-CM

## 2022-08-24 DIAGNOSIS — I48.20 CHRONIC ATRIAL FIBRILLATION: ICD-10-CM

## 2022-08-24 DIAGNOSIS — I87.2 VENOUS INSUFFICIENCY OF LOWER EXTREMITY, UNSPECIFIED LATERALITY: ICD-10-CM

## 2022-08-24 DIAGNOSIS — G47.33 OSA (OBSTRUCTIVE SLEEP APNEA): ICD-10-CM

## 2022-08-24 DIAGNOSIS — I25.10 CORONARY ARTERY DISEASE INVOLVING NATIVE CORONARY ARTERY OF NATIVE HEART WITHOUT ANGINA PECTORIS: ICD-10-CM

## 2022-08-24 DIAGNOSIS — I10 ESSENTIAL HYPERTENSION: ICD-10-CM

## 2022-08-24 PROBLEM — I50.32 CHRONIC DIASTOLIC HEART FAILURE: Status: ACTIVE | Noted: 2019-11-20

## 2022-08-24 PROCEDURE — 1157F ADVNC CARE PLAN IN RCRD: CPT | Mod: CPTII,S$GLB,, | Performed by: INTERNAL MEDICINE

## 2022-08-24 PROCEDURE — 1126F PR PAIN SEVERITY QUANTIFIED, NO PAIN PRESENT: ICD-10-PCS | Mod: CPTII,S$GLB,, | Performed by: INTERNAL MEDICINE

## 2022-08-24 PROCEDURE — 99214 PR OFFICE/OUTPT VISIT, EST, LEVL IV, 30-39 MIN: ICD-10-PCS | Mod: S$GLB,,, | Performed by: INTERNAL MEDICINE

## 2022-08-24 PROCEDURE — 1159F PR MEDICATION LIST DOCUMENTED IN MEDICAL RECORD: ICD-10-PCS | Mod: CPTII,S$GLB,, | Performed by: INTERNAL MEDICINE

## 2022-08-24 PROCEDURE — 99999 PR PBB SHADOW E&M-EST. PATIENT-LVL IV: ICD-10-PCS | Mod: PBBFAC,,, | Performed by: INTERNAL MEDICINE

## 2022-08-24 PROCEDURE — 1159F MED LIST DOCD IN RCRD: CPT | Mod: CPTII,S$GLB,, | Performed by: INTERNAL MEDICINE

## 2022-08-24 PROCEDURE — 99214 OFFICE O/P EST MOD 30 MIN: CPT | Mod: S$GLB,,, | Performed by: INTERNAL MEDICINE

## 2022-08-24 PROCEDURE — 1157F PR ADVANCE CARE PLAN OR EQUIV PRESENT IN MEDICAL RECORD: ICD-10-PCS | Mod: CPTII,S$GLB,, | Performed by: INTERNAL MEDICINE

## 2022-08-24 PROCEDURE — 3074F SYST BP LT 130 MM HG: CPT | Mod: CPTII,S$GLB,, | Performed by: INTERNAL MEDICINE

## 2022-08-24 PROCEDURE — 3074F PR MOST RECENT SYSTOLIC BLOOD PRESSURE < 130 MM HG: ICD-10-PCS | Mod: CPTII,S$GLB,, | Performed by: INTERNAL MEDICINE

## 2022-08-24 PROCEDURE — 1126F AMNT PAIN NOTED NONE PRSNT: CPT | Mod: CPTII,S$GLB,, | Performed by: INTERNAL MEDICINE

## 2022-08-24 PROCEDURE — 3078F DIAST BP <80 MM HG: CPT | Mod: CPTII,S$GLB,, | Performed by: INTERNAL MEDICINE

## 2022-08-24 PROCEDURE — 99999 PR PBB SHADOW E&M-EST. PATIENT-LVL IV: CPT | Mod: PBBFAC,,, | Performed by: INTERNAL MEDICINE

## 2022-08-24 PROCEDURE — 3078F PR MOST RECENT DIASTOLIC BLOOD PRESSURE < 80 MM HG: ICD-10-PCS | Mod: CPTII,S$GLB,, | Performed by: INTERNAL MEDICINE

## 2022-08-24 NOTE — PROGRESS NOTES
Subjective:   Patient ID:  Adriana Strong is a 77 y.o. female who presents for follow up  of Hypertension, Hyperlipidemia, Obesity, Atrial Fibrillation, and non cardiac chest pain      HPI:         77 year old female with CAD, HLD, HTN. AS s/p TAVR, DM, CVA, CHF, AF and PPM is here for follow-up.  She was in emergency room room July 2022 complaining of chest discomfort.  Her workup was overall unremarkable.  She has a outpatient PET stress ordered and scheduled bundle performed.  She has chest discomfort that is reproducible with with palpation.  It is associated dyspnea.  She is compliant with her medication.        She takes Eliquis for AF.  EKG paced   Initial troponin negative      [facility] 2019 prior to TAVR:  1.  The LVH made coronary angiography very difficult.  A 10 cc was required for coronary angiograms.  2.  The left main was free of disease.  It was very short making cannulation of the LAD difficulty.  3.  The LAD had a long tubular stenosis of 70% at the diagonal bifurcation.  The diagonal was a larger vessel than the LAD making intervention on the LAD suboptimal.  4.  The left circumflex was normal.  5.  The right coronary artery was normal.          Patient Active Problem List    Diagnosis Date Noted    Seizure-like activity     Numbness and tingling 12/13/2021    Aortic atherosclerosis 06/25/2021    History of transcatheter aortic valve replacement (TAVR) 12/08/2020    Coronary artery disease involving native coronary artery of native heart without angina pectoris 12/08/2020    Chronic diastolic CHF (congestive heart failure) 11/12/2020    Chronic atrial fibrillation 11/12/2020    BMI 40.0-44.9, adult 11/12/2020    Localized edema 11/12/2020    CAD (coronary artery disease) 10/30/2020    Asthma in adult 10/30/2020    Other chest pain 10/29/2020    Comfort measures only status 10/21/2020    Acute cystitis without hematuria     Leukocytosis 10/19/2020    Dysuria 10/19/2020    Hyponatremia  2020    Atrial flutter 05/15/2020    Vasovagal syncope 2019    TACOS (acute kidney injury) 2019    Headache 2019    Morbid obesity 2019    Chronic diastolic heart failure 2019    S/P TAVR (transcatheter aortic valve replacement) 2019    Lumbar radiculopathy 2019    Decreased range of motion of trunk and back 06/10/2019    Lumbar spondylosis 06/10/2019    Neuroforaminal stenosis of lumbar spine 06/10/2019    Spinal stenosis of lumbar region with neurogenic claudication 06/10/2019    Low back pain 2019    Difficulty walking 2019    Muscle weakness 2019    Balance problems 2019    DDD (degenerative disc disease), lumbar 2019    Cataract 2019    Diabetic polyneuropathy associated with type 2 diabetes mellitus 2019    Costochondritis 2017    COPD (chronic obstructive pulmonary disease) 2017    Umbilical hernia without obstruction and without gangrene 2017    Incisional hernia, without obstruction or gangrene 2017    Aortic stenosis 2017    Constipation 2017    COPD exacerbation 2017    Old lacunar stroke without late effect 2016     On Eliquis and plavix      Pure hypercholesterolemia 2016    Essential hypertension 2016    Gastroesophageal reflux disease     Mesenteric artery insufficiency 2015    Chronic mesenteric ischemia 2015    Enlarged ovary 2014    Type 2 diabetes mellitus 2014     A1C 7.0      Severe persistent asthma with acute exacerbation 2014    GERD (gastroesophageal reflux disease) 2014           Right Arm BP - Sittin/69  Left Arm BP - Sittin/69        LABS      LAST HbA1c  Lab Results   Component Value Date    HGBA1C 5.8 (H) 2022       Lipid panel  Lab Results   Component Value Date    CHOL 168 2022    CHOL 143 2021    CHOL 161 2020     Lab Results    Component Value Date    HDL 69 08/11/2022    HDL 56 03/16/2021    HDL 75 08/25/2020     Lab Results   Component Value Date    LDLCALC 87.0 08/11/2022    LDLCALC 69.4 03/16/2021    LDLCALC 75.2 08/25/2020     Lab Results   Component Value Date    TRIG 60 08/11/2022    TRIG 88 03/16/2021    TRIG 54 08/25/2020     Lab Results   Component Value Date    CHOLHDL 41.1 08/11/2022    CHOLHDL 39.2 03/16/2021    CHOLHDL 46.6 08/25/2020            Review of Systems   Constitutional: Negative for diaphoresis, night sweats, weight gain and weight loss.   HENT: Negative for congestion.    Eyes: Negative for blurred vision, discharge and double vision.   Cardiovascular: Positive for chest pain and dyspnea on exertion. Negative for claudication, cyanosis, irregular heartbeat, leg swelling, near-syncope, orthopnea, palpitations, paroxysmal nocturnal dyspnea and syncope.   Respiratory: Positive for shortness of breath. Negative for cough and wheezing.    Endocrine: Negative for cold intolerance, heat intolerance and polyphagia.   Hematologic/Lymphatic: Negative for adenopathy and bleeding problem. Does not bruise/bleed easily.   Skin: Negative for dry skin and nail changes.   Musculoskeletal: Positive for arthritis. Negative for back pain, falls, joint pain, myalgias and neck pain.   Gastrointestinal: Negative for bloating, abdominal pain, change in bowel habit and constipation.   Genitourinary: Negative for bladder incontinence, dysuria, flank pain, genital sores and missed menses.   Neurological: Negative for aphonia, brief paralysis, difficulty with concentration, dizziness and weakness.   Psychiatric/Behavioral: Negative for altered mental status and memory loss. The patient does not have insomnia.    Allergic/Immunologic: Negative for environmental allergies.       Objective:   Physical Exam  Constitutional:       Appearance: She is well-developed.      Interventions: She is not intubated.  HENT:      Head: Normocephalic and  atraumatic.      Right Ear: External ear normal.      Left Ear: External ear normal.   Eyes:      General: No scleral icterus.        Right eye: No discharge.         Left eye: No discharge.      Conjunctiva/sclera: Conjunctivae normal.      Pupils: Pupils are equal, round, and reactive to light.   Neck:      Thyroid: No thyromegaly.      Vascular: Normal carotid pulses. No carotid bruit, hepatojugular reflux or JVD.      Trachea: No tracheal deviation.   Cardiovascular:      Rate and Rhythm: Normal rate. Rhythm irregularly irregular.  No extrasystoles are present.     Chest Wall: PMI is not displaced.      Pulses:           Carotid pulses are 2+ on the right side and 2+ on the left side.       Radial pulses are 2+ on the right side and 2+ on the left side.        Femoral pulses are 2+ on the right side and 2+ on the left side.       Popliteal pulses are 2+ on the right side and 2+ on the left side.        Dorsalis pedis pulses are 1+ on the right side and 1+ on the left side.        Posterior tibial pulses are 1+ on the right side and 1+ on the left side.      Heart sounds: S1 normal and S2 normal. Heart sounds not distant. No midsystolic click. Murmur heard.    Systolic murmur is present with a grade of 2/6 at the upper right sternal border.    No friction rub. No gallop. No S3 sounds.   Pulmonary:      Effort: Pulmonary effort is normal. No tachypnea, bradypnea, accessory muscle usage or respiratory distress. She is not intubated.      Breath sounds: Normal breath sounds. No stridor. No decreased breath sounds, wheezing or rales.   Chest:      Chest wall: Tenderness present.   Abdominal:      General: There is no distension or abdominal bruit.      Palpations: There is no mass or pulsatile mass.      Tenderness: There is no abdominal tenderness. There is no guarding or rebound.   Musculoskeletal:         General: No tenderness. Normal range of motion.      Cervical back: Normal range of motion and neck supple.    Lymphadenopathy:      Cervical: No cervical adenopathy.   Skin:     General: Skin is warm.      Coloration: Skin is not pale.      Findings: No erythema or rash.   Neurological:      Mental Status: She is alert and oriented to person, place, and time.      Cranial Nerves: No cranial nerve deficit.      Coordination: Coordination normal.      Deep Tendon Reflexes: Reflexes are normal and symmetric.   Psychiatric:         Behavior: Behavior normal.         Thought Content: Thought content normal.         Judgment: Judgment normal.         Assessment:     1. Other chest pain    2. Venous insufficiency of lower extremity, unspecified laterality    3. RADHA (obstructive sleep apnea)    4. Chronic bronchitis, unspecified chronic bronchitis type    5. Essential hypertension    6. Pure hypercholesterolemia    7. S/P TAVR (transcatheter aortic valve replacement)    8. Chronic atrial fibrillation    9. Coronary artery disease involving native coronary artery of native heart without angina pectoris    10. Aortic atherosclerosis    11. BMI 40.0-44.9, adult    12. Localized edema        Plan:       PET stress for complete ischemic evaluation.  She wears reassured that some of her discomfort could be related to costochondritis.  Weight loss.  Exercise.  Low-salt diet.  Continue with diuretic dose as previously prescribed.  Obtain venous insufficiency ultrasound to assess the extent reflux disease.  Compression stockings 20-30 mmHg.    Follow up after PET stress and ultrasound   Referral to sleep disorder clinic for evaluation and management of RADHA.       Continue with current medical plan and lifestyle changes.  Return sooner for concerns or questions. If symptoms persist go to the ED  I have reviewed all pertinent data on this patient       I have reviewed the patient's medical history in detail and updated the computerized patient record.    Orders Placed This Encounter   Procedures    Ambulatory referral/consult to Sleep  Disorders     Standing Status:   Future     Standing Expiration Date:   9/24/2023     Referral Priority:   Routine     Referral Type:   Consultation     Requested Specialty:   Sleep Medicine     Number of Visits Requested:   1    CV US Lower Extremity Veins Bilateral Insufficiency     Standing Status:   Future     Standing Expiration Date:   8/24/2023     Order Specific Question:   Release to patient     Answer:   Immediate       Follow up as scheduled. Return sooner for concerns or questions            She expressed verbal understanding and agreed with the plan        Patient's Medications   New Prescriptions    No medications on file   Previous Medications    ACETAMINOPHEN (TYLENOL) 650 MG TBSR    Take 1,300 mg by mouth 2 (two) times daily.    ALBUTEROL (PROVENTIL) 2.5 MG /3 ML (0.083 %) NEBULIZER SOLUTION    Take 3 mLs (2.5 mg total) by nebulization every 4 (four) hours as needed (wheezing or SOB).    ALBUTEROL-IPRATROPIUM (DUO-NEB) 2.5 MG-0.5 MG/3 ML NEBULIZER SOLUTION    NEBULIZE 1 VIAL EVERY 4 HOURS AS NEEDED    APIXABAN (ELIQUIS) 5 MG TAB    Take 1 tablet (5 mg total) by mouth 2 (two) times daily.    ASCORBIC ACID, VITAMIN C, (VITAMIN C) 500 MG TABLET    Take 1,000 mg by mouth once daily.     ATORVASTATIN (LIPITOR) 10 MG TABLET    Take 1 tablet (10 mg total) by mouth once daily.    BISACODYL (DULCOLAX) 5 MG EC TABLET    Take 1 tablet (5 mg total) by mouth daily as needed for Constipation.    CALCIUM CARBONATE/VITAMIN D3 (CALTRATE 600 + D ORAL)    Take 1 tablet by mouth once daily.    CARVEDILOL (COREG) 6.25 MG TABLET    Take 1 tablet (6.25 mg total) by mouth 2 (two) times daily.    CLOPIDOGREL (PLAVIX) 75 MG TABLET    Take 1 tablet (75 mg total) by mouth once daily.    CYANOCOBALAMIN (VITAMIN B-12) 1000 MCG TABLET    Take 1 tablet (1,000 mcg total) by mouth once daily.    DILTIAZEM (CARDIZEM CD) 240 MG 24 HR CAPSULE    Take 1 capsule (240 mg total) by mouth once daily.    DULOXETINE (CYMBALTA) 20 MG CAPSULE     Take 1 capsule (20 mg total) by mouth once daily.    FEXOFENADINE (ALLEGRA) 60 MG TABLET    Take 3 tablets (180 mg total) by mouth every morning.    FISH OIL-OMEGA-3 FATTY ACIDS 300-1,000 MG CAPSULE    Take 1 g by mouth once daily.     FLUTICASONE PROPIONATE (FLONASE) 50 MCG/ACTUATION NASAL SPRAY    1 spray (50 mcg total) by Each Nostril route every morning.    FUROSEMIDE (LASIX) 40 MG TABLET    Take 1 tablet (40 mg total) by mouth 2 (two) times a day.    GABAPENTIN (NEURONTIN) 300 MG CAPSULE    Take 2 capsules (600 mg total) by mouth 3 (three) times daily.    ISOSORBIDE MONONITRATE (IMDUR) 60 MG 24 HR TABLET    Take 1 tablet (60 mg total) by mouth once daily.    LEVALBUTEROL (XOPENEX) 1.25 MG/3 ML NEBULIZER SOLUTION    SMARTSI Vial(s) Via Nebulizer    LORAZEPAM (ATIVAN) 0.5 MG TABLET    Take 1 tablet (0.5 mg total) by mouth every morning.    LOSARTAN (COZAAR) 25 MG TABLET    Take 1 tablet (25 mg total) by mouth once daily.    METFORMIN (GLUCOPHAGE) 500 MG TABLET    Take 1 tablet (500 mg total) by mouth 2 (two) times daily.    MONTELUKAST (SINGULAIR) 10 MG TABLET    Take 10 mg by mouth once daily.    MV-MN/FOLIC AC/CALCIUM/VIT K1 (WOMEN'S 50 PLUS MULTIVITAMIN ORAL)    Take 1 tablet by mouth once daily.    NITROGLYCERIN (NITROSTAT) 0.4 MG SL TABLET    Place 1 tablet (0.4 mg total) under the tongue every 5 (five) minutes as needed for Chest pain.    NYSTATIN (MYCOSTATIN) POWDER    Apply topically every 8 (eight) hours.    ONDANSETRON (ZOFRAN-ODT) 4 MG TBDL    Take 1 tablet (4 mg total) by mouth every 8 (eight) hours as needed (nausea).    OXYBUTYNIN (DITROPAN-XL) 5 MG TR24    Take 1 tablet (5 mg total) by mouth every other day.    PANTOPRAZOLE (PROTONIX) 40 MG TABLET    Take 1 tablet (40 mg total) by mouth once daily.    POLYSORBATE 80/GLYCERIN (REFRESH DRY EYE THERAPY OPHT)    Apply to eye 2 (two) times daily.    POTASSIUM GLUCONATE 550 MG (90 MG) TAB    Take 180 mg by mouth 2 (two) times a day.    RANOLAZINE  (RANEXA) 500 MG TB12    Take 1 tablet (500 mg total) by mouth 2 (two) times daily.    SYMBICORT 160-4.5 MCG/ACTUATION HFAA    Inhale 1 puff into the lungs 2 (two) times daily.    VITAMIN D (VITAMIN D3) 1000 UNITS TAB    Take 2,000 Units by mouth once daily.   Modified Medications    No medications on file   Discontinued Medications    No medications on file

## 2022-08-31 ENCOUNTER — TELEPHONE (OUTPATIENT)
Dept: ADMINISTRATIVE | Facility: OTHER | Age: 77
End: 2022-08-31
Payer: MEDICARE

## 2022-09-12 ENCOUNTER — TELEPHONE (OUTPATIENT)
Dept: CARDIOLOGY | Facility: HOSPITAL | Age: 77
End: 2022-09-12
Payer: MEDICARE

## 2022-09-14 ENCOUNTER — HOSPITAL ENCOUNTER (OUTPATIENT)
Dept: CARDIOLOGY | Facility: HOSPITAL | Age: 77
Discharge: HOME | End: 2022-09-14
Attending: INTERNAL MEDICINE
Payer: MEDICARE

## 2022-09-14 ENCOUNTER — HOSPITAL ENCOUNTER (OUTPATIENT)
Dept: CARDIOLOGY | Facility: HOSPITAL | Age: 77
Discharge: HOME | End: 2022-09-14
Payer: MEDICARE

## 2022-09-14 VITALS
DIASTOLIC BLOOD PRESSURE: 74 MMHG | SYSTOLIC BLOOD PRESSURE: 158 MMHG | BODY MASS INDEX: 40.97 KG/M2 | HEART RATE: 69 BPM | HEIGHT: 64 IN | WEIGHT: 240 LBS

## 2022-09-14 DIAGNOSIS — I25.118 CORONARY ARTERY DISEASE OF NATIVE ARTERY OF NATIVE HEART WITH STABLE ANGINA PECTORIS: ICD-10-CM

## 2022-09-14 DIAGNOSIS — I87.2 VENOUS INSUFFICIENCY OF LOWER EXTREMITY, UNSPECIFIED LATERALITY: ICD-10-CM

## 2022-09-14 LAB
CFR FLOW - ANTERIOR: 1.4
CFR FLOW - INFERIOR: 1.39
CFR FLOW - LATERAL: 1.39
CFR FLOW - MAX: 1.82
CFR FLOW - MIN: 0.94
CFR FLOW - SEPTAL: 1.25
CFR FLOW - WHOLE HEART: 1.36
CV PHARM DOSE: 0.4 MG
CV STRESS BASE HR: 69 BPM
DIASTOLIC BLOOD PRESSURE: 74 MMHG
EJECTION FRACTION- HIGH: 65 %
END DIASTOLIC INDEX-HIGH: 153 ML/M2
END DIASTOLIC INDEX-LOW: 93 ML/M2
END SYSTOLIC INDEX-HIGH: 71 ML/M2
END SYSTOLIC INDEX-LOW: 31 ML/M2
NUC REST DIASTOLIC VOLUME INDEX: 87
NUC REST EJECTION FRACTION: 64
NUC REST SYSTOLIC VOLUME INDEX: 31
NUC STRESS DIASTOLIC VOLUME INDEX: 91
NUC STRESS EJECTION FRACTION: 64 %
NUC STRESS SYSTOLIC VOLUME INDEX: 33
OHS CV CPX 85 PERCENT MAX PREDICTED HEART RATE MALE: 116
OHS CV CPX MAX PREDICTED HEART RATE: 136
OHS CV CPX PATIENT IS FEMALE: 1
OHS CV CPX PATIENT IS MALE: 0
OHS CV CPX PEAK DIASTOLIC BLOOD PRESSURE: 44 MMHG
OHS CV CPX PEAK HEAR RATE: 76 BPM
OHS CV CPX PEAK RATE PRESSURE PRODUCT: 8132
OHS CV CPX PEAK SYSTOLIC BLOOD PRESSURE: 107 MMHG
OHS CV CPX PERCENT MAX PREDICTED HEART RATE ACHIEVED: 56
OHS CV CPX RATE PRESSURE PRODUCT PRESENTING: NORMAL
REST FLOW - ANTERIOR: 0.72 CC/MIN/G
REST FLOW - INFERIOR: 0.73 CC/MIN/G
REST FLOW - LATERAL: 0.66 CC/MIN/G
REST FLOW - MAX: 0.96 CC/MIN/G
REST FLOW - MIN: 0.29 CC/MIN/G
REST FLOW - SEPTAL: 0.56 CC/MIN/G
REST FLOW - WHOLE HEART: 0.67 CC/MIN/G
RETIRED EF AND QEF - SEE NOTES: 53 %
STRESS FLOW - ANTERIOR: 1 CC/MIN/G
STRESS FLOW - INFERIOR: 1 CC/MIN/G
STRESS FLOW - LATERAL: 0.92 CC/MIN/G
STRESS FLOW - MAX: 1.29 CC/MIN/G
STRESS FLOW - MIN: 0.28 CC/MIN/G
STRESS FLOW - SEPTAL: 0.7 CC/MIN/G
STRESS FLOW - WHOLE HEART: 0.91 CC/MIN/G
SYSTOLIC BLOOD PRESSURE: 158 MMHG

## 2022-09-14 PROCEDURE — 63600175 PHARM REV CODE 636 W HCPCS: Performed by: NURSE PRACTITIONER

## 2022-09-14 PROCEDURE — 93970 EXTREMITY STUDY: CPT | Mod: TC

## 2022-09-14 PROCEDURE — 93016 CV STRESS TEST SUPVJ ONLY: CPT | Mod: ,,, | Performed by: INTERNAL MEDICINE

## 2022-09-14 PROCEDURE — 93018 CARDIAC PET SCAN STRESS (CUPID ONLY): ICD-10-PCS | Mod: ,,, | Performed by: INTERNAL MEDICINE

## 2022-09-14 PROCEDURE — 93016 CARDIAC PET SCAN STRESS (CUPID ONLY): ICD-10-PCS | Mod: ,,, | Performed by: INTERNAL MEDICINE

## 2022-09-14 PROCEDURE — 78431 MYOCRD IMG PET RST&STRS CT: CPT | Mod: 26,,, | Performed by: INTERNAL MEDICINE

## 2022-09-14 PROCEDURE — 78434 AQMBF PET REST & RX STRESS: CPT | Mod: 26,,, | Performed by: INTERNAL MEDICINE

## 2022-09-14 PROCEDURE — 93970 EXTREMITY STUDY: CPT | Mod: 26,,, | Performed by: INTERNAL MEDICINE

## 2022-09-14 PROCEDURE — 93970 CV US LOWER VENOUS INSUFFICIENCY BILATERAL (CUPID ONLY): ICD-10-PCS | Mod: 26,,, | Performed by: INTERNAL MEDICINE

## 2022-09-14 PROCEDURE — 78434 CARDIAC PET SCAN STRESS (CUPID ONLY): ICD-10-PCS | Mod: 26,,, | Performed by: INTERNAL MEDICINE

## 2022-09-14 PROCEDURE — 78431 CARDIAC PET SCAN STRESS (CUPID ONLY): ICD-10-PCS | Mod: 26,,, | Performed by: INTERNAL MEDICINE

## 2022-09-14 PROCEDURE — 93018 CV STRESS TEST I&R ONLY: CPT | Mod: ,,, | Performed by: INTERNAL MEDICINE

## 2022-09-14 PROCEDURE — 78434 AQMBF PET REST & RX STRESS: CPT

## 2022-09-14 RX ORDER — CAFFEINE CITRATE 20 MG/ML
60 SOLUTION INTRAVENOUS
Status: COMPLETED | OUTPATIENT
Start: 2022-09-14 | End: 2022-09-14

## 2022-09-14 RX ORDER — REGADENOSON 0.08 MG/ML
0.4 INJECTION, SOLUTION INTRAVENOUS ONCE
Status: COMPLETED | OUTPATIENT
Start: 2022-09-14 | End: 2022-09-14

## 2022-09-14 RX ORDER — AMINOPHYLLINE 25 MG/ML
75 INJECTION, SOLUTION INTRAVENOUS ONCE
Status: COMPLETED | OUTPATIENT
Start: 2022-09-14 | End: 2022-09-14

## 2022-09-14 RX ADMIN — AMINOPHYLLINE 75 MG: 25 INJECTION, SOLUTION INTRAVENOUS at 11:09

## 2022-09-14 RX ADMIN — CAFFEINE CITRATE 60 MG: 20 INJECTION, SOLUTION INTRAVENOUS at 11:09

## 2022-09-14 RX ADMIN — REGADENOSON 0.4 MG: 0.08 INJECTION, SOLUTION INTRAVENOUS at 10:09

## 2022-09-15 LAB
LEFT GREAT SAPHENOUS DISTAL THIGH DIA: 0.64 CM
LEFT GREAT SAPHENOUS DISTAL THIGH REFLUX: 1422 MS
LEFT GREAT SAPHENOUS JUNCTION DIA: 0.59 CM
LEFT GREAT SAPHENOUS MIDDLE THIGH DIA: 0.52 CM
LEFT SMALL SAPHENOUS KNEE DIA: 0.38 CM
RIGHT GREAT SAPHENOUS DISTAL THIGH DIA: 0.53 CM
RIGHT GREAT SAPHENOUS DISTAL THIGH REFLUX: 1427 MS
RIGHT GREAT SAPHENOUS JUNCTION DIA: 0.64 CM
RIGHT GREAT SAPHENOUS MIDDLE THIGH DIA: 0.54 CM
RIGHT GREAT SAPHENOUS MIDDLE THIGH REFLUX: 850 MS
RIGHT SMALL SAPHENOUS KNEE DIA: 0.34 CM

## 2022-09-30 ENCOUNTER — HOSPITAL ENCOUNTER (INPATIENT)
Facility: HOSPITAL | Age: 77
LOS: 2 days | Discharge: HOME | DRG: 291 | End: 2022-10-02
Attending: EMERGENCY MEDICINE | Admitting: INTERNAL MEDICINE
Payer: MEDICARE

## 2022-09-30 DIAGNOSIS — M62.81 MUSCLE WEAKNESS: ICD-10-CM

## 2022-09-30 DIAGNOSIS — R09.02 HYPOXIA: Primary | ICD-10-CM

## 2022-09-30 DIAGNOSIS — I25.10 CORONARY ARTERY DISEASE INVOLVING NATIVE CORONARY ARTERY OF NATIVE HEART WITHOUT ANGINA PECTORIS: ICD-10-CM

## 2022-09-30 DIAGNOSIS — M25.69 DECREASED RANGE OF MOTION OF TRUNK AND BACK: ICD-10-CM

## 2022-09-30 DIAGNOSIS — E87.1 HYPONATREMIA: ICD-10-CM

## 2022-09-30 DIAGNOSIS — I50.32 CHRONIC DIASTOLIC HEART FAILURE: ICD-10-CM

## 2022-09-30 DIAGNOSIS — E78.00 PURE HYPERCHOLESTEROLEMIA: ICD-10-CM

## 2022-09-30 DIAGNOSIS — R55 VASOVAGAL SYNCOPE: ICD-10-CM

## 2022-09-30 DIAGNOSIS — R07.9 CHEST PAIN: ICD-10-CM

## 2022-09-30 DIAGNOSIS — I50.30 (HFPEF) HEART FAILURE WITH PRESERVED EJECTION FRACTION: ICD-10-CM

## 2022-09-30 DIAGNOSIS — N83.8 ENLARGED OVARY: ICD-10-CM

## 2022-09-30 DIAGNOSIS — R26.2 DIFFICULTY WALKING: ICD-10-CM

## 2022-09-30 DIAGNOSIS — R60.0 LOCALIZED EDEMA: ICD-10-CM

## 2022-09-30 DIAGNOSIS — Z95.2 S/P TAVR (TRANSCATHETER AORTIC VALVE REPLACEMENT): ICD-10-CM

## 2022-09-30 DIAGNOSIS — Z51.5 COMFORT MEASURES ONLY STATUS: ICD-10-CM

## 2022-09-30 DIAGNOSIS — M51.36 DDD (DEGENERATIVE DISC DISEASE), LUMBAR: ICD-10-CM

## 2022-09-30 DIAGNOSIS — J44.1 COPD EXACERBATION: ICD-10-CM

## 2022-09-30 DIAGNOSIS — J45.51 SEVERE PERSISTENT ASTHMA WITH ACUTE EXACERBATION: ICD-10-CM

## 2022-09-30 DIAGNOSIS — I50.32 CHRONIC DIASTOLIC CHF (CONGESTIVE HEART FAILURE): ICD-10-CM

## 2022-09-30 DIAGNOSIS — K42.9 UMBILICAL HERNIA WITHOUT OBSTRUCTION AND WITHOUT GANGRENE: ICD-10-CM

## 2022-09-30 DIAGNOSIS — M48.062 SPINAL STENOSIS OF LUMBAR REGION WITH NEUROGENIC CLAUDICATION: ICD-10-CM

## 2022-09-30 DIAGNOSIS — I50.9 CONGESTIVE HEART FAILURE, UNSPECIFIED HF CHRONICITY, UNSPECIFIED HEART FAILURE TYPE: ICD-10-CM

## 2022-09-30 DIAGNOSIS — Z86.73 OLD LACUNAR STROKE WITHOUT LATE EFFECT: ICD-10-CM

## 2022-09-30 DIAGNOSIS — R20.2 NUMBNESS AND TINGLING: ICD-10-CM

## 2022-09-30 DIAGNOSIS — M54.16 LUMBAR RADICULOPATHY: ICD-10-CM

## 2022-09-30 DIAGNOSIS — R30.0 DYSURIA: ICD-10-CM

## 2022-09-30 DIAGNOSIS — I50.33 ACUTE ON CHRONIC HEART FAILURE WITH PRESERVED EJECTION FRACTION: ICD-10-CM

## 2022-09-30 DIAGNOSIS — N17.9 AKI (ACUTE KIDNEY INJURY): ICD-10-CM

## 2022-09-30 DIAGNOSIS — I10 ESSENTIAL HYPERTENSION: ICD-10-CM

## 2022-09-30 DIAGNOSIS — R20.0 NUMBNESS AND TINGLING: ICD-10-CM

## 2022-09-30 DIAGNOSIS — R56.9 SEIZURE-LIKE ACTIVITY: ICD-10-CM

## 2022-09-30 DIAGNOSIS — R07.89 OTHER CHEST PAIN: ICD-10-CM

## 2022-09-30 DIAGNOSIS — N30.00 ACUTE CYSTITIS WITHOUT HEMATURIA: ICD-10-CM

## 2022-09-30 DIAGNOSIS — I48.20 CHRONIC ATRIAL FIBRILLATION: ICD-10-CM

## 2022-09-30 DIAGNOSIS — E66.01 MORBID OBESITY: ICD-10-CM

## 2022-09-30 DIAGNOSIS — M48.061 NEUROFORAMINAL STENOSIS OF LUMBAR SPINE: ICD-10-CM

## 2022-09-30 DIAGNOSIS — M94.0 COSTOCHONDRITIS: ICD-10-CM

## 2022-09-30 DIAGNOSIS — I50.33 ACUTE ON CHRONIC DIASTOLIC CONGESTIVE HEART FAILURE: ICD-10-CM

## 2022-09-30 DIAGNOSIS — K21.9 GASTROESOPHAGEAL REFLUX DISEASE, UNSPECIFIED WHETHER ESOPHAGITIS PRESENT: ICD-10-CM

## 2022-09-30 DIAGNOSIS — R26.89 BALANCE PROBLEMS: ICD-10-CM

## 2022-09-30 DIAGNOSIS — J45.20 MILD INTERMITTENT ASTHMA IN ADULT WITHOUT COMPLICATION: ICD-10-CM

## 2022-09-30 DIAGNOSIS — I50.813 ACUTE ON CHRONIC RIGHT-SIDED CONGESTIVE HEART FAILURE: ICD-10-CM

## 2022-09-30 DIAGNOSIS — K59.00 CONSTIPATION, UNSPECIFIED CONSTIPATION TYPE: ICD-10-CM

## 2022-09-30 DIAGNOSIS — K55.1 MESENTERIC ARTERY INSUFFICIENCY: ICD-10-CM

## 2022-09-30 DIAGNOSIS — I70.0 AORTIC ATHEROSCLEROSIS: ICD-10-CM

## 2022-09-30 DIAGNOSIS — E11.42 DIABETIC POLYNEUROPATHY ASSOCIATED WITH TYPE 2 DIABETES MELLITUS: ICD-10-CM

## 2022-09-30 DIAGNOSIS — K55.1 CHRONIC MESENTERIC ISCHEMIA: ICD-10-CM

## 2022-09-30 DIAGNOSIS — K43.2 INCISIONAL HERNIA, WITHOUT OBSTRUCTION OR GANGRENE: ICD-10-CM

## 2022-09-30 DIAGNOSIS — M47.816 LUMBAR SPONDYLOSIS: ICD-10-CM

## 2022-09-30 DIAGNOSIS — Z95.2 HISTORY OF TRANSCATHETER AORTIC VALVE REPLACEMENT (TAVR): ICD-10-CM

## 2022-09-30 LAB
ALBUMIN SERPL BCP-MCNC: 3.6 G/DL (ref 3.5–5.2)
ALLENS TEST: ABNORMAL
ALLENS TEST: ABNORMAL
ALP SERPL-CCNC: 40 U/L (ref 55–135)
ALT SERPL W/O P-5'-P-CCNC: 23 U/L (ref 10–44)
ANION GAP SERPL CALC-SCNC: 15 MMOL/L (ref 8–16)
AST SERPL-CCNC: 23 U/L (ref 10–40)
BASOPHILS # BLD AUTO: 0.03 K/UL (ref 0–0.2)
BASOPHILS NFR BLD: 0.2 % (ref 0–1.9)
BILIRUB SERPL-MCNC: 1 MG/DL (ref 0.1–1)
BNP SERPL-MCNC: 392 PG/ML (ref 0–99)
BUN SERPL-MCNC: 16 MG/DL (ref 8–23)
CALCIUM SERPL-MCNC: 9.1 MG/DL (ref 8.7–10.5)
CHLORIDE SERPL-SCNC: 97 MMOL/L (ref 95–110)
CO2 SERPL-SCNC: 25 MMOL/L (ref 23–29)
CREAT SERPL-MCNC: 0.7 MG/DL (ref 0.5–1.4)
CTP QC/QA: YES
DELSYS: ABNORMAL
DELSYS: ABNORMAL
DIFFERENTIAL METHOD: ABNORMAL
EOSINOPHIL # BLD AUTO: 0 K/UL (ref 0–0.5)
EOSINOPHIL NFR BLD: 0 % (ref 0–8)
ERYTHROCYTE [DISTWIDTH] IN BLOOD BY AUTOMATED COUNT: 14.1 % (ref 11.5–14.5)
EST. GFR  (NO RACE VARIABLE): >60 ML/MIN/1.73 M^2
FERRITIN SERPL-MCNC: 81 NG/ML (ref 20–300)
FIO2: 28
FLOW: 2
GLUCOSE SERPL-MCNC: 157 MG/DL (ref 70–110)
HCO3 UR-SCNC: 32.2 MMOL/L (ref 24–28)
HCO3 UR-SCNC: 34.2 MMOL/L (ref 24–28)
HCT VFR BLD AUTO: 36.4 % (ref 37–48.5)
HGB BLD-MCNC: 12.1 G/DL (ref 12–16)
IMM GRANULOCYTES # BLD AUTO: 0.08 K/UL (ref 0–0.04)
IMM GRANULOCYTES NFR BLD AUTO: 0.6 % (ref 0–0.5)
INR PPP: 1.2 (ref 0.8–1.2)
LACTATE SERPL-SCNC: 2.4 MMOL/L (ref 0.5–2.2)
LYMPHOCYTES # BLD AUTO: 1.7 K/UL (ref 1–4.8)
LYMPHOCYTES NFR BLD: 13.4 % (ref 18–48)
MCH RBC QN AUTO: 31.6 PG (ref 27–31)
MCHC RBC AUTO-ENTMCNC: 33.2 G/DL (ref 32–36)
MCV RBC AUTO: 95 FL (ref 82–98)
MODE: ABNORMAL
MONOCYTES # BLD AUTO: 1.6 K/UL (ref 0.3–1)
MONOCYTES NFR BLD: 12.8 % (ref 4–15)
NEUTROPHILS # BLD AUTO: 9.1 K/UL (ref 1.8–7.7)
NEUTROPHILS NFR BLD: 73 % (ref 38–73)
NRBC BLD-RTO: 0 /100 WBC
PCO2 BLDA: 45 MMHG (ref 35–45)
PCO2 BLDA: 49.7 MMHG (ref 35–45)
PH SMN: 7.45 [PH] (ref 7.35–7.45)
PH SMN: 7.46 [PH] (ref 7.35–7.45)
PLATELET # BLD AUTO: 223 K/UL (ref 150–450)
PMV BLD AUTO: 10.4 FL (ref 9.2–12.9)
PO2 BLDA: 46 MMHG (ref 40–60)
PO2 BLDA: 93 MMHG (ref 80–100)
POC BE: 10 MMOL/L
POC BE: 8 MMOL/L
POC SATURATED O2: 83 % (ref 95–100)
POC SATURATED O2: 98 % (ref 95–100)
POC TCO2: 34 MMOL/L (ref 23–27)
POC TCO2: 36 MMOL/L (ref 24–29)
POCT GLUCOSE: 202 MG/DL (ref 70–110)
POCT GLUCOSE: 202 MG/DL (ref 70–110)
POCT GLUCOSE: 206 MG/DL (ref 70–110)
POTASSIUM SERPL-SCNC: 3.6 MMOL/L (ref 3.5–5.1)
PROCALCITONIN SERPL IA-MCNC: 0.06 NG/ML
PROT SERPL-MCNC: 7.2 G/DL (ref 6–8.4)
PROTHROMBIN TIME: 11.9 SEC (ref 9–12.5)
RBC # BLD AUTO: 3.83 M/UL (ref 4–5.4)
SAMPLE: ABNORMAL
SAMPLE: ABNORMAL
SARS-COV-2 RDRP RESP QL NAA+PROBE: NEGATIVE
SITE: ABNORMAL
SITE: ABNORMAL
SODIUM SERPL-SCNC: 137 MMOL/L (ref 136–145)
SP02: 97
T4 FREE SERPL-MCNC: 1.1 NG/DL (ref 0.71–1.51)
TROPONIN I SERPL DL<=0.01 NG/ML-MCNC: 0.02 NG/ML (ref 0–0.03)
TROPONIN I SERPL DL<=0.01 NG/ML-MCNC: 0.02 NG/ML (ref 0–0.03)
TSH SERPL DL<=0.005 MIU/L-ACNC: 0.1 UIU/ML (ref 0.4–4)
WBC # BLD AUTO: 12.48 K/UL (ref 3.9–12.7)

## 2022-09-30 PROCEDURE — 82728 ASSAY OF FERRITIN: CPT

## 2022-09-30 PROCEDURE — 63600175 PHARM REV CODE 636 W HCPCS

## 2022-09-30 PROCEDURE — 93010 ELECTROCARDIOGRAM REPORT: CPT | Mod: ,,, | Performed by: INTERNAL MEDICINE

## 2022-09-30 PROCEDURE — 36600 WITHDRAWAL OF ARTERIAL BLOOD: CPT

## 2022-09-30 PROCEDURE — 99285 EMERGENCY DEPT VISIT HI MDM: CPT | Mod: 25

## 2022-09-30 PROCEDURE — 25000242 PHARM REV CODE 250 ALT 637 W/ HCPCS: Performed by: EMERGENCY MEDICINE

## 2022-09-30 PROCEDURE — 93005 ELECTROCARDIOGRAM TRACING: CPT

## 2022-09-30 PROCEDURE — 25000242 PHARM REV CODE 250 ALT 637 W/ HCPCS

## 2022-09-30 PROCEDURE — 83605 ASSAY OF LACTIC ACID: CPT

## 2022-09-30 PROCEDURE — 99223 1ST HOSP IP/OBS HIGH 75: CPT | Mod: ,,, | Performed by: INTERNAL MEDICINE

## 2022-09-30 PROCEDURE — 25000003 PHARM REV CODE 250

## 2022-09-30 PROCEDURE — 84466 ASSAY OF TRANSFERRIN: CPT

## 2022-09-30 PROCEDURE — 83880 ASSAY OF NATRIURETIC PEPTIDE: CPT

## 2022-09-30 PROCEDURE — 84439 ASSAY OF FREE THYROXINE: CPT

## 2022-09-30 PROCEDURE — 99900035 HC TECH TIME PER 15 MIN (STAT)

## 2022-09-30 PROCEDURE — 96374 THER/PROPH/DIAG INJ IV PUSH: CPT

## 2022-09-30 PROCEDURE — 93010 EKG 12-LEAD: ICD-10-PCS | Mod: ,,, | Performed by: INTERNAL MEDICINE

## 2022-09-30 PROCEDURE — 84443 ASSAY THYROID STIM HORMONE: CPT

## 2022-09-30 PROCEDURE — 82803 BLOOD GASES ANY COMBINATION: CPT

## 2022-09-30 PROCEDURE — 84145 PROCALCITONIN (PCT): CPT

## 2022-09-30 PROCEDURE — 94640 AIRWAY INHALATION TREATMENT: CPT

## 2022-09-30 PROCEDURE — 80053 COMPREHEN METABOLIC PANEL: CPT

## 2022-09-30 PROCEDURE — 96375 TX/PRO/DX INJ NEW DRUG ADDON: CPT

## 2022-09-30 PROCEDURE — 25000003 PHARM REV CODE 250: Performed by: STUDENT IN AN ORGANIZED HEALTH CARE EDUCATION/TRAINING PROGRAM

## 2022-09-30 PROCEDURE — 99223 PR INITIAL HOSPITAL CARE,LEVL III: ICD-10-PCS | Mod: ,,, | Performed by: INTERNAL MEDICINE

## 2022-09-30 PROCEDURE — 85025 COMPLETE CBC W/AUTO DIFF WBC: CPT

## 2022-09-30 PROCEDURE — 11000001 HC ACUTE MED/SURG PRIVATE ROOM

## 2022-09-30 PROCEDURE — 94761 N-INVAS EAR/PLS OXIMETRY MLT: CPT

## 2022-09-30 PROCEDURE — 84484 ASSAY OF TROPONIN QUANT: CPT | Mod: 91

## 2022-09-30 PROCEDURE — 63600175 PHARM REV CODE 636 W HCPCS: Performed by: STUDENT IN AN ORGANIZED HEALTH CARE EDUCATION/TRAINING PROGRAM

## 2022-09-30 PROCEDURE — 84484 ASSAY OF TROPONIN QUANT: CPT

## 2022-09-30 PROCEDURE — 85610 PROTHROMBIN TIME: CPT

## 2022-09-30 PROCEDURE — U0002 COVID-19 LAB TEST NON-CDC: HCPCS | Performed by: EMERGENCY MEDICINE

## 2022-09-30 PROCEDURE — 27000221 HC OXYGEN, UP TO 24 HOURS

## 2022-09-30 RX ORDER — GABAPENTIN 300 MG/1
300 CAPSULE ORAL 3 TIMES DAILY
Status: DISCONTINUED | OUTPATIENT
Start: 2022-09-30 | End: 2022-10-02 | Stop reason: HOSPADM

## 2022-09-30 RX ORDER — GUAIFENESIN 100 MG/5ML
400 SOLUTION ORAL EVERY 4 HOURS PRN
Status: DISCONTINUED | OUTPATIENT
Start: 2022-09-30 | End: 2022-09-30

## 2022-09-30 RX ORDER — INSULIN ASPART 100 [IU]/ML
0-5 INJECTION, SOLUTION INTRAVENOUS; SUBCUTANEOUS
Status: DISCONTINUED | OUTPATIENT
Start: 2022-09-30 | End: 2022-10-02 | Stop reason: HOSPADM

## 2022-09-30 RX ORDER — PANTOPRAZOLE SODIUM 40 MG/1
40 TABLET, DELAYED RELEASE ORAL DAILY
Status: DISCONTINUED | OUTPATIENT
Start: 2022-10-01 | End: 2022-10-02 | Stop reason: HOSPADM

## 2022-09-30 RX ORDER — IBUPROFEN 200 MG
16 TABLET ORAL
Status: DISCONTINUED | OUTPATIENT
Start: 2022-09-30 | End: 2022-09-30

## 2022-09-30 RX ORDER — GLUCAGON 1 MG
1 KIT INJECTION
Status: DISCONTINUED | OUTPATIENT
Start: 2022-09-30 | End: 2022-09-30

## 2022-09-30 RX ORDER — FUROSEMIDE 10 MG/ML
80 INJECTION INTRAMUSCULAR; INTRAVENOUS
Status: COMPLETED | OUTPATIENT
Start: 2022-09-30 | End: 2022-09-30

## 2022-09-30 RX ORDER — NITROGLYCERIN 0.4 MG/1
0.4 TABLET SUBLINGUAL EVERY 5 MIN PRN
Status: DISCONTINUED | OUTPATIENT
Start: 2022-09-30 | End: 2022-09-30

## 2022-09-30 RX ORDER — FUROSEMIDE 40 MG/1
40 TABLET ORAL 2 TIMES DAILY
Status: DISCONTINUED | OUTPATIENT
Start: 2022-09-30 | End: 2022-09-30

## 2022-09-30 RX ORDER — IPRATROPIUM BROMIDE AND ALBUTEROL SULFATE 2.5; .5 MG/3ML; MG/3ML
3 SOLUTION RESPIRATORY (INHALATION)
Status: COMPLETED | OUTPATIENT
Start: 2022-09-30 | End: 2022-09-30

## 2022-09-30 RX ORDER — OXYBUTYNIN CHLORIDE 5 MG/1
5 TABLET, EXTENDED RELEASE ORAL EVERY OTHER DAY
Status: DISCONTINUED | OUTPATIENT
Start: 2022-10-01 | End: 2022-10-02 | Stop reason: HOSPADM

## 2022-09-30 RX ORDER — CLOPIDOGREL BISULFATE 75 MG/1
75 TABLET ORAL DAILY
Status: DISCONTINUED | OUTPATIENT
Start: 2022-09-30 | End: 2022-10-02 | Stop reason: HOSPADM

## 2022-09-30 RX ORDER — CARVEDILOL 6.25 MG/1
6.25 TABLET ORAL 2 TIMES DAILY
Status: DISCONTINUED | OUTPATIENT
Start: 2022-09-30 | End: 2022-10-02 | Stop reason: HOSPADM

## 2022-09-30 RX ORDER — DILTIAZEM HYDROCHLORIDE 120 MG/1
240 CAPSULE, COATED, EXTENDED RELEASE ORAL DAILY
Status: DISCONTINUED | OUTPATIENT
Start: 2022-10-01 | End: 2022-10-02 | Stop reason: HOSPADM

## 2022-09-30 RX ORDER — MONTELUKAST SODIUM 10 MG/1
10 TABLET ORAL DAILY
Status: DISCONTINUED | OUTPATIENT
Start: 2022-09-30 | End: 2022-10-02 | Stop reason: HOSPADM

## 2022-09-30 RX ORDER — DOXYCYCLINE HYCLATE 100 MG
100 TABLET ORAL EVERY 12 HOURS
Status: DISCONTINUED | OUTPATIENT
Start: 2022-09-30 | End: 2022-10-02

## 2022-09-30 RX ORDER — NALOXONE HCL 0.4 MG/ML
0.02 VIAL (ML) INJECTION
Status: DISCONTINUED | OUTPATIENT
Start: 2022-09-30 | End: 2022-10-02 | Stop reason: HOSPADM

## 2022-09-30 RX ORDER — PREDNISONE 20 MG/1
40 TABLET ORAL DAILY
Status: DISCONTINUED | OUTPATIENT
Start: 2022-10-01 | End: 2022-10-01

## 2022-09-30 RX ORDER — FLUTICASONE FUROATE AND VILANTEROL 100; 25 UG/1; UG/1
1 POWDER RESPIRATORY (INHALATION) DAILY
Status: DISCONTINUED | OUTPATIENT
Start: 2022-09-30 | End: 2022-10-02 | Stop reason: HOSPADM

## 2022-09-30 RX ORDER — GUAIFENESIN 100 MG/5ML
400 SOLUTION ORAL EVERY 4 HOURS PRN
Status: DISCONTINUED | OUTPATIENT
Start: 2022-09-30 | End: 2022-10-02 | Stop reason: HOSPADM

## 2022-09-30 RX ORDER — ATORVASTATIN CALCIUM 10 MG/1
10 TABLET, FILM COATED ORAL DAILY
Status: DISCONTINUED | OUTPATIENT
Start: 2022-10-01 | End: 2022-10-02 | Stop reason: HOSPADM

## 2022-09-30 RX ORDER — LISINOPRIL 10 MG/1
10 TABLET ORAL DAILY
COMMUNITY
Start: 2022-06-04 | End: 2022-09-30

## 2022-09-30 RX ORDER — AMOXICILLIN 250 MG
1 CAPSULE ORAL 2 TIMES DAILY
Status: DISCONTINUED | OUTPATIENT
Start: 2022-09-30 | End: 2022-10-02 | Stop reason: HOSPADM

## 2022-09-30 RX ORDER — ISOSORBIDE MONONITRATE 30 MG/1
60 TABLET, EXTENDED RELEASE ORAL DAILY
Status: DISCONTINUED | OUTPATIENT
Start: 2022-10-01 | End: 2022-10-02 | Stop reason: HOSPADM

## 2022-09-30 RX ORDER — METHOCARBAMOL 500 MG/1
500 TABLET, FILM COATED ORAL
COMMUNITY
Start: 2022-09-13 | End: 2022-09-30

## 2022-09-30 RX ORDER — BENZONATATE 100 MG/1
100 CAPSULE ORAL 3 TIMES DAILY PRN
Status: DISCONTINUED | OUTPATIENT
Start: 2022-09-30 | End: 2022-10-02 | Stop reason: HOSPADM

## 2022-09-30 RX ORDER — IBUPROFEN 200 MG
24 TABLET ORAL
Status: DISCONTINUED | OUTPATIENT
Start: 2022-09-30 | End: 2022-10-02 | Stop reason: HOSPADM

## 2022-09-30 RX ORDER — GABAPENTIN 300 MG/1
600 CAPSULE ORAL 3 TIMES DAILY
Status: DISCONTINUED | OUTPATIENT
Start: 2022-09-30 | End: 2022-09-30

## 2022-09-30 RX ORDER — ACETAMINOPHEN 325 MG/1
650 TABLET ORAL EVERY 4 HOURS PRN
Status: DISCONTINUED | OUTPATIENT
Start: 2022-09-30 | End: 2022-10-02 | Stop reason: HOSPADM

## 2022-09-30 RX ORDER — SODIUM CHLORIDE 0.9 % (FLUSH) 0.9 %
10 SYRINGE (ML) INJECTION EVERY 12 HOURS PRN
Status: DISCONTINUED | OUTPATIENT
Start: 2022-09-30 | End: 2022-10-02 | Stop reason: HOSPADM

## 2022-09-30 RX ORDER — IPRATROPIUM BROMIDE AND ALBUTEROL SULFATE 2.5; .5 MG/3ML; MG/3ML
3 SOLUTION RESPIRATORY (INHALATION)
Status: DISCONTINUED | OUTPATIENT
Start: 2022-09-30 | End: 2022-10-01

## 2022-09-30 RX ORDER — FLUTICASONE PROPIONATE 50 MCG
1 SPRAY, SUSPENSION (ML) NASAL EVERY MORNING
Status: DISCONTINUED | OUTPATIENT
Start: 2022-09-30 | End: 2022-10-02 | Stop reason: HOSPADM

## 2022-09-30 RX ORDER — POLYETHYLENE GLYCOL 3350 17 G/17G
17 POWDER, FOR SOLUTION ORAL DAILY
Status: DISCONTINUED | OUTPATIENT
Start: 2022-09-30 | End: 2022-10-02 | Stop reason: HOSPADM

## 2022-09-30 RX ORDER — DULOXETIN HYDROCHLORIDE 20 MG/1
20 CAPSULE, DELAYED RELEASE ORAL DAILY
Status: DISCONTINUED | OUTPATIENT
Start: 2022-10-01 | End: 2022-10-02 | Stop reason: HOSPADM

## 2022-09-30 RX ORDER — FUROSEMIDE 10 MG/ML
80 INJECTION INTRAMUSCULAR; INTRAVENOUS
Status: DISCONTINUED | OUTPATIENT
Start: 2022-09-30 | End: 2022-10-01

## 2022-09-30 RX ORDER — ALBUTEROL SULFATE 2.5 MG/.5ML
5 SOLUTION RESPIRATORY (INHALATION)
Status: COMPLETED | OUTPATIENT
Start: 2022-09-30 | End: 2022-09-30

## 2022-09-30 RX ORDER — RANOLAZINE 500 MG/1
500 TABLET, EXTENDED RELEASE ORAL 2 TIMES DAILY
Status: DISCONTINUED | OUTPATIENT
Start: 2022-09-30 | End: 2022-10-02 | Stop reason: HOSPADM

## 2022-09-30 RX ORDER — LOSARTAN POTASSIUM 25 MG/1
25 TABLET ORAL DAILY
Status: DISCONTINUED | OUTPATIENT
Start: 2022-10-01 | End: 2022-10-02 | Stop reason: HOSPADM

## 2022-09-30 RX ORDER — METHYLPREDNISOLONE SOD SUCC 125 MG
125 VIAL (EA) INJECTION
Status: COMPLETED | OUTPATIENT
Start: 2022-09-30 | End: 2022-09-30

## 2022-09-30 RX ADMIN — DOCUSATE SODIUM AND SENNOSIDES 1 TABLET: 8.6; 5 TABLET, FILM COATED ORAL at 09:09

## 2022-09-30 RX ADMIN — POTASSIUM BICARBONATE 10 MEQ: 391 TABLET, EFFERVESCENT ORAL at 02:09

## 2022-09-30 RX ADMIN — IPRATROPIUM BROMIDE AND ALBUTEROL SULFATE 3 ML: 2.5; .5 SOLUTION RESPIRATORY (INHALATION) at 10:09

## 2022-09-30 RX ADMIN — BENZONATATE 100 MG: 100 CAPSULE ORAL at 09:09

## 2022-09-30 RX ADMIN — DOXYCYCLINE HYCLATE 100 MG: 100 TABLET, COATED ORAL at 06:09

## 2022-09-30 RX ADMIN — GABAPENTIN 300 MG: 300 CAPSULE ORAL at 09:09

## 2022-09-30 RX ADMIN — NITROGLYCERIN 0.4 MG: 0.4 TABLET, ORALLY DISINTEGRATING SUBLINGUAL at 11:09

## 2022-09-30 RX ADMIN — IPRATROPIUM BROMIDE AND ALBUTEROL SULFATE 3 ML: .5; 2.5 SOLUTION RESPIRATORY (INHALATION) at 07:09

## 2022-09-30 RX ADMIN — MONTELUKAST 10 MG: 10 TABLET, FILM COATED ORAL at 02:09

## 2022-09-30 RX ADMIN — METHYLPREDNISOLONE SODIUM SUCCINATE 125 MG: 125 INJECTION, POWDER, FOR SOLUTION INTRAMUSCULAR; INTRAVENOUS at 09:09

## 2022-09-30 RX ADMIN — CARVEDILOL 6.25 MG: 6.25 TABLET, FILM COATED ORAL at 09:09

## 2022-09-30 RX ADMIN — INSULIN ASPART 2 UNITS: 100 INJECTION, SOLUTION INTRAVENOUS; SUBCUTANEOUS at 04:09

## 2022-09-30 RX ADMIN — INSULIN ASPART 1 UNITS: 100 INJECTION, SOLUTION INTRAVENOUS; SUBCUTANEOUS at 09:09

## 2022-09-30 RX ADMIN — APIXABAN 5 MG: 5 TABLET, FILM COATED ORAL at 09:09

## 2022-09-30 RX ADMIN — GABAPENTIN 300 MG: 300 CAPSULE ORAL at 03:09

## 2022-09-30 RX ADMIN — FUROSEMIDE 80 MG: 10 INJECTION, SOLUTION INTRAMUSCULAR; INTRAVENOUS at 11:09

## 2022-09-30 RX ADMIN — CLOPIDOGREL 75 MG: 75 TABLET, FILM COATED ORAL at 02:09

## 2022-09-30 RX ADMIN — ALBUTEROL SULFATE 5 MG: 2.5 SOLUTION RESPIRATORY (INHALATION) at 12:09

## 2022-09-30 RX ADMIN — RANOLAZINE 500 MG: 500 TABLET, FILM COATED, EXTENDED RELEASE ORAL at 09:09

## 2022-09-30 RX ADMIN — IPRATROPIUM BROMIDE AND ALBUTEROL SULFATE 3 ML: .5; 2.5 SOLUTION RESPIRATORY (INHALATION) at 03:09

## 2022-09-30 RX ADMIN — GUAIFENESIN 400 MG: 100 SOLUTION ORAL at 11:09

## 2022-09-30 RX ADMIN — FUROSEMIDE 80 MG: 10 INJECTION, SOLUTION INTRAMUSCULAR; INTRAVENOUS at 04:09

## 2022-09-30 RX ADMIN — FLUTICASONE FUROATE AND VILANTEROL TRIFENATATE 1 PUFF: 100; 25 POWDER RESPIRATORY (INHALATION) at 03:09

## 2022-09-30 NOTE — ED PROVIDER NOTES
Encounter Date: 9/30/2022       History     Chief Complaint   Patient presents with    Shortness of Breath     Sob +cough +wheezing +labored breathing since tues. Was placed on rescue inhaler, neb treatment and steriods with failed improvements. +tachypnea and O2 saturations 93%on RA. Unable to ambulate at present time due to extreme fatigue and sob     Pt is a 77 year old female with PMHx significant for asthma, HTN, DM, CAD, COPD, a-fib(on eliquis), and CHF who presents for evaluation of acute shortness of breath, which began 3 days ago. She notes an associated non productive cough, wheezing, and left sided chest pain. Pt states symptoms are consistent with previous asthma exacerbations. She has utilized increasing amounts of her albuterol treatments without relief. Denies any fever, chills, nausea, vomiting, diarrhea, or abdominal pain.     The history is provided by the patient and medical records.   Review of patient's allergies indicates:   Allergen Reactions    Celebrex [celecoxib] Shortness Of Breath    Ciprofloxacin Swelling     lip    Dicyclomine Hives    Adhesive Dermatitis    Avelox [moxifloxacin] Swelling    Cilostazol Other (See Comments)     Elevates blood pressure    Eryc [erythromycin] Other (See Comments)     unknown    Iodine and iodide containing products Hives    Keflex [cephalexin] Hives    Meclomen      rashes    Meloxicam      Ear ringing    Metoclopramide Other (See Comments)     High blood pressure    Sulfa (sulfonamide antibiotics) Itching     Past Medical History:   Diagnosis Date    Acute on chronic diastolic (congestive) heart failure 11/20/2019    Anticoagulant long-term use     on Plavix since May 2015    Anxiety     Arthritis     Asthma     Atrial fibrillation     Atrial fibrillation with RVR 10/19/2020    Cataracts, bilateral     Chest pain, atypical     Chronic atrial fibrillation with rapid ventricular response 6/23/2017    Colon polyp     Coronary artery disease     Diabetes  mellitus     Dry eyes     Esophageal erosions     GERD (gastroesophageal reflux disease)     Heart murmur     Hemorrhoid     High cholesterol     Hypertension     Irritable bowel syndrome     Paroxysmal atrial fibrillation 10/30/2020    Persistent atrial fibrillation 2/10/2020    Shingles 2015    Stenosis of aortic and mitral valves     Stroke     TIA (transient ischemic attack)     Use of cane as ambulatory aid      Past Surgical History:   Procedure Laterality Date    ABDOMINAL SURGERY      stents to SMA    angiocele      2007, 2014    AORTIC VALVE REPLACEMENT N/A 11/19/2019    Procedure: Replacement-valve-aortic;  Surgeon: Jack Capone MD;  Location: [facility] CATH LAB;  Service: Cardiology;  Laterality: N/A;    BREAST SURGERY      left--- a lump--- no cancer    CARDIAC VALVE SURGERY      COLONOSCOPY  2014    COLONOSCOPY N/A 3/14/2018    Procedure: COLONOSCOPY;  Surgeon: Efren Kemp MD;  Location: [facility] ENDO ([address] [place] [address]);  Service: Endoscopy;  Laterality: N/A;  ok to hold Plavix 5 days prior to procedure per Dr RESHMA Hernandez     ok per Dr Kemp to hold Eliquis 2 days prior to procedure (see telephone encounter dated-2/7/18)    HERNIA REPAIR      HYSTERECTOMY  partial    1982 partial hysterectomy    LEFT HEART CATHETERIZATION Right 11/5/2019    Procedure: Left heart cath;  Surgeon: Jack Capone MD;  Location: [facility] CATH LAB;  Service: Cardiology;  Laterality: Right;    left nasal polyp      left neck lymph node      nose polyp      right hip fatty mass tissue      stent to small intestine      SUPERIOR VENA CAVA ANGIOPLASTY / STENTING      TONSILLECTOMY      TOTAL ABDOMINAL HYSTERECTOMY      2014    TOTAL KNEE ARTHROPLASTY Bilateral     TREATMENT OF CARDIAC ARRHYTHMIA N/A 2/10/2020    Procedure: CARDIOVERSION;  Surgeon: Jayy Parrish MD;  Location: [facility] EP LAB;  Service: Cardiology;  Laterality: N/A;  AF, PEDRO, DCCV, MAC, DM, 3 Prep    TREATMENT OF CARDIAC ARRHYTHMIA N/A 5/15/2020    Procedure: CARDIOVERSION;  Surgeon:  Hany Bee MD;  Location: [facility] EP LAB;  Service: Cardiology;  Laterality: N/A;  AF, PEDRO, DCCV, MAC, DM, 3 Prep    UPPER GASTROINTESTINAL ENDOSCOPY  2014     Family History   Problem Relation Age of Onset    Heart attack Mother     Stroke Father     Heart failure Brother     Colon cancer Neg Hx     Inflammatory bowel disease Neg Hx      Social History     Tobacco Use    Smoking status: Never    Smokeless tobacco: Never   Substance Use Topics    Alcohol use: No    Drug use: No     Review of Systems   Constitutional:  Negative for chills and fever.   HENT:  Negative for ear discharge and ear pain.    Eyes:  Negative for photophobia and visual disturbance.   Respiratory:  Positive for cough, shortness of breath and wheezing.    Cardiovascular:  Positive for chest pain.   Gastrointestinal:  Negative for anal bleeding, diarrhea, nausea and vomiting.   Genitourinary:  Negative for dysuria and frequency.   Musculoskeletal:  Negative for back pain and neck pain.   Skin:  Negative for rash and wound.   Neurological:  Negative for dizziness and numbness.     Physical Exam     Initial Vitals [09/30/22 0916]   BP Pulse Resp Temp SpO2   (!) 192/77 69 (!) 24 98.9 °F (37.2 °C) (!) 92 %      MAP       --         Physical Exam    Nursing note and vitals reviewed.  Constitutional: She appears well-developed and well-nourished.   HENT:   Head: Normocephalic and atraumatic.   Eyes: EOM are normal. No scleral icterus.   Neck: Neck supple.   Cardiovascular:  Normal rate, regular rhythm and intact distal pulses.           No murmur heard.  Pulmonary/Chest: No stridor. She is in respiratory distress. She has wheezes.   Tachypnea, hypoxia   Abdominal: Abdomen is soft. She exhibits no distension. There is no abdominal tenderness.   Musculoskeletal:         General: Edema (trace bilaral lower extremity) present. Normal range of motion.      Cervical back: Neck supple.     Neurological: She is alert and oriented to person, place, and  time. She has normal strength. No sensory deficit.   Skin: Skin is warm and dry.       ED Course   Procedures  Labs Reviewed   CBC W/ AUTO DIFFERENTIAL - Abnormal; Notable for the following components:       Result Value    RBC 3.83 (*)     Hematocrit 36.4 (*)     MCH 31.6 (*)     Immature Granulocytes 0.6 (*)     Gran # (ANC) 9.1 (*)     Immature Grans (Abs) 0.08 (*)     Mono # 1.6 (*)     Lymph % 13.4 (*)     All other components within normal limits   COMPREHENSIVE METABOLIC PANEL - Abnormal; Notable for the following components:    Glucose 157 (*)     Alkaline Phosphatase 40 (*)     All other components within normal limits   B-TYPE NATRIURETIC PEPTIDE - Abnormal; Notable for the following components:     (*)     All other components within normal limits   LACTIC ACID, PLASMA - Abnormal; Notable for the following components:    Lactate (Lactic Acid) 2.4 (*)     All other components within normal limits   ISTAT PROCEDURE - Abnormal; Notable for the following components:    POC PCO2 49.7 (*)     POC HCO3 34.2 (*)     POC SATURATED O2 83 (*)     POC TCO2 36 (*)     All other components within normal limits   TROPONIN I   TROPONIN I   PROCALCITONIN   PROTIME-INR   FERRITIN   HEMOGLOBIN A1C   IRON AND TIBC   TSH   PROCALCITONIN   SARS-COV-2 RDRP GENE     EKG Readings: (Independently Interpreted)   Initial Reading: No STEMI. Rhythm: Paced Rhythm. Heart Rate: 69.   ECG Results              EKG 12-lead (Final result)  Result time 09/30/22 16:04:53      Final result by Interface, Lab In [facility] (09/30/22 16:04:53)                   Narrative:    Test Reason : R07.9,    Vent. Rate : 069 BPM     Atrial Rate : 072 BPM     P-R Int : 000 ms          QRS Dur : 148 ms      QT Int : 448 ms       P-R-T Axes : 000 248 059 degrees     QTc Int : 480 ms    Ventricular-paced rhythm  Abnormal ECG  Confirmed by Jolene Sorto MD ([id number]) on 9/30/2022 4:04:44 PM    Referred By: DOT   SELF           Confirmed By:Jolene Sorto MD                                   Imaging Results              CT Chest Without Contrast (Final result)  Result time 09/30/22 15:42:58      Final result by Cherise Vasquez MD (09/30/22 15:42:58)                   Impression:      1. Ill-defined airspace nodules seen throughout both lungs, most concerning for infection.  The differential includes aspiration and alveolar edema.  2. Findings concerning for tracheobronchomalacia.  3. Severe left-sided coronary artery calcifications.  4. Severe abdominal aortic calcifications, including severe calcification of the celiac origin.      Electronically signed by: Cherise Vasquez  Date:    09/30/2022  Time:    15:42               Narrative:    EXAMINATION:  CT CHEST WITHOUT CONTRAST    CLINICAL HISTORY:  Dyspnea, chronic, unclear etiology;    TECHNIQUE:  Low dose axial images, sagittal and coronal reformations were obtained from the thoracic inlet to the lung bases. Contrast was not administered.    COMPARISON:  Chest radiograph performed earlier today    FINDINGS:  Support tubes and lines: None    Aorta: Patient is status post aortic valve replacement. The aorta is normal caliber    Heart: Mildly enlarged.    Coronary arteries: Severe left-sided coronary artery calcifications    Pericardium: Normal. No effusion, thickening, or calcification.    Central pulmonary arteries: Normal caliber.    Base of neck/thyroid: Unremarkable.    Lymph nodes: No supraclavicular, axillary, internal mammary, mediastinal, or hilar adenopathy.    Esophagus: Unremarkable.    Pleura: No effusion, thickening, or calcification.    Upper abdomen: The abdominal aorta is heavily calcified.  The celiac origin is heavily calcified.    Body wall: Unremarkable.    Airways: Collapsed likely as the patient is in expiration.    Lungs: Multiple ill-defined airspace nodules are seen in the right upper lobe, right middle lobe and both lower lobes.  Increased attenuation seen in both lungs is likely secondary  "to the patient being in expiration.    Bones: Unremarkable.                                       X-Ray Chest AP Portable (Final result)  Result time 09/30/22 10:47:05      Final result by Edilberto Cook MD (09/30/22 10:47:05)                   Impression:      No detrimental change when compared with 08/11/2022.      Electronically signed by: Edilberto Cook MD  Date:    09/30/2022  Time:    10:47               Narrative:    EXAMINATION:  XR CHEST AP PORTABLE    CLINICAL HISTORY:  Provided history is "Chest Pain;  ".    TECHNIQUE:  One view of the chest.    COMPARISON:  08/11/2022.    FINDINGS:  Cardiac wires overlie the chest.  Left chest wall cardiac pacing device is present with transvenous leads overlying the heart.  Right hemidiaphragm is elevated.  Postoperative changes of TAVR.  Cardiac silhouette is enlarged and similar to the prior study.  Atherosclerotic calcifications overlie the aortic arch.  Lung volumes are relatively low, accentuating basilar markings.  No confluent area of consolidation identified.  No large pleural effusion.  No pneumothorax.  No detrimental change when compared with the prior study.                                       Medications   guaiFENesin 100 mg/5 ml syrup 400 mg (400 mg Oral Given 10/1/22 2116)   apixaban tablet 5 mg (5 mg Oral Given 10/2/22 0903)   atorvastatin tablet 10 mg (10 mg Oral Given 10/2/22 0903)   carvediloL tablet 6.25 mg (6.25 mg Oral Given 10/2/22 0904)   clopidogreL tablet 75 mg (75 mg Oral Given 10/2/22 0904)   diltiaZEM 24 hr capsule 240 mg (240 mg Oral Given 10/2/22 0903)   DULoxetine DR capsule 20 mg (20 mg Oral Given 10/2/22 0904)   fluticasone propionate 50 mcg/actuation nasal spray 50 mcg (50 mcg Each Nostril Given 10/2/22 0634)   isosorbide mononitrate 24 hr tablet 60 mg (60 mg Oral Given 10/2/22 0903)   losartan tablet 25 mg (25 mg Oral Given 10/2/22 0903)   montelukast tablet 10 mg (10 mg Oral Given 10/2/22 0903)   oxybutynin 24 hr tablet 5 mg " (5 mg Oral Given 10/1/22 0835)   pantoprazole EC tablet 40 mg (40 mg Oral Given 10/2/22 0903)   potassium bicarbonate disintegrating tablet 10 mEq (10 mEq Oral Given 10/2/22 0903)   ranolazine 12 hr tablet 500 mg (500 mg Oral Given 10/2/22 0903)   fluticasone furoate-vilanteroL 100-25 mcg/dose diskus inhaler 1 puff (1 puff Inhalation Given by Other 10/2/22 0835)   sodium chloride 0.9% flush 10 mL (has no administration in time range)   naloxone 0.4 mg/mL injection 0.02 mg (has no administration in time range)   glucose chewable tablet 24 g (has no administration in time range)   dextrose 10% bolus 250 mL (has no administration in time range)   acetaminophen tablet 650 mg (650 mg Oral Given 10/1/22 1454)   polyethylene glycol packet 17 g (17 g Oral Given 10/2/22 0903)   senna-docusate 8.6-50 mg per tablet 1 tablet (1 tablet Oral Given 10/2/22 0903)   insulin aspart U-100 pen 0-5 Units (1 Units Subcutaneous Given 9/30/22 2131)   gabapentin capsule 300 mg (300 mg Oral Given 10/2/22 0904)   benzonatate capsule 100 mg (100 mg Oral Given 9/30/22 2144)   doxycycline tablet 100 mg (100 mg Oral Given 10/2/22 0903)   furosemide tablet 40 mg (40 mg Oral Given 10/2/22 0903)   albuterol-ipratropium 2.5 mg-0.5 mg/3 mL nebulizer solution 3 mL (3 mLs Nebulization Given 10/2/22 0823)   albuterol-ipratropium 2.5 mg-0.5 mg/3 mL nebulizer solution 3 mL (3 mLs Nebulization Given 9/30/22 1025)   methylPREDNISolone sodium succinate injection 125 mg (125 mg Intravenous Given 9/30/22 0957)   furosemide injection 80 mg (80 mg Intravenous Given 9/30/22 1118)   albuterol sulfate nebulizer solution 5 mg (5 mg Nebulization Given 9/30/22 1208)   methylPREDNISolone sodium succinate injection 125 mg (125 mg Intravenous Given by Other 10/2/22 0109)     Medical Decision Making:   History:   Old Medical Records: I decided to obtain old medical records.  Initial Assessment:   Pt is a 77 year old female with PMHx significant for asthma, HTN, DM, CAD,  a-fib(on eliquis), and CHF who presents for evaluation of shortness of breath, which began 3 days ago. She notes an associated non productive cough, wheezing, and left sided chest pain  Differential Diagnosis:   Differential includes but is not limited to: asthma exacerbation, COPD exacerbation, CHF exacerbation, pneumonia, covid, ACS  Independently Interpreted Test(s):   I have ordered and independently interpreted EKG Reading(s) - see prior notes  Clinical Tests:   Lab Tests: Ordered and Reviewed  Radiological Study: Ordered and Reviewed  Medical Tests: Ordered and Reviewed  ED Management:  Pt presents with hypoxic respiratory distress and hypertensive emergency. Concern for CHF and COPD exacerbation. Pt treated initially with duonebs and steroids with minimal improvement in shortness of breath. Pt noted improvement after being placed on 2L nasal cannula. Lab work concerning for elevated BNP and pt treated with IV lasix. Pt with mild improvement but continued tachypnea and increased work of breathing. Pt admitted for further management and care.           Attending Attestation:             Attending ED Notes:   Attending Critical Care:   Critical Care Times:   ==============================================================  · Total Critical Care Time - exclusive of procedural time: 30 minutes.  ==============================================================  Critical Care Condition: potientially life-threatening   Critical Care Comments: Critical Care time was inclusive of direct patient care, review of previous records, interpretation of labs, imaging and ekg, as well as discussion of my impression and plan of care with the patient, family and other clinicians/consultants. This time is exclusive of any separate billable procedures and of treating other patients. Critical care was required for this patient who presented with hypoxic resp failure with oxygen sats at 88% despite initial therapy with duoneb, steroids,  diuresis - likely secondary to COPD, CHF +/- pneumonia.    ED Course as of 10/02/22 0909   Fri Sep 30, 2022   1158 I provided a face to face evaluation of this patient.  I discussed the patient's care with the resident.  I reviewed their note and agree with the history, physical, assessment, diagnosis, treatment, and plan provided by the the resident. The patient presented to the emergency department with shortness of breath and cough worsening over the past few days.  Patient presents with hypoxia, respiratory distress and hypertension.  Dimished breath sounds at the bases, moderate wheezing and wet cough auscultated in mid and upper lung fields bilaterally.  Patient given DuoNeb and Lasix with no improved still tachypneic on my evaluation, oxygen saturation 88% on room air. My overall impression is CHF and COPD exacerbation, hypoxic resp failure. Possible pneumonia, will get CT chest, continuous nebs and reassess. [NP]   1230 Pt not significantly improved. Still sitting upright, short-winded and wheezing. Will need likely need further diuresis, nebs, steroids, possible abx if pneumonia suspected. Pending CT at this time. Will admit for further care. [NP]      ED Course User Index  [NP] Moises Kim MD                 Clinical Impression:   Final diagnoses:  [R07.9] Chest pain  [I50.9] Congestive heart failure, unspecified HF chronicity, unspecified heart failure type  [J44.1] COPD exacerbation  [R09.02] Hypoxia (Primary)        ED Disposition Condition    Admit                 Jr. John Singh MD  Resident  09/30/22 1647       Moises Kim MD  10/02/22 0909       Moises Kim MD  10/02/22 0910

## 2022-09-30 NOTE — H&P
[facility] Medicine H&P Note     Admitting Team: [facility] Hospitalist Team B  Attending Physician: Moises Kim MD  Resident: Hany Figueroa MD  Intern: Aure Frazier DO     Date of Admit: 9/30/2022    Chief Complaint     Shortness of breath x4 days a/w nonproductive cough, chest pain/tightness, dizziness    Subjective:      History of Present Illness:  Adriana Strong is a 77 y.o. female who has a PMHx HFpEF (LVEF 55%), pacemaker placement, adult-onset Asthma, Atrial fibrillation, Coronary artery disease, COPD, Type 2 Diabetes mellitus, Esophageal erosions, GERD, HLD, HTN, Irritable bowel syndrome, aortic stenosis s/p bioprosthetic TAVR, Stroke, TIA (transient ischemic attack).     The patient presented to [facility] on 9/30/2022 with a primary complaint of worsening, shortness of breath waking her from sleep x4 days a/w nonproductive cough, chest pain/tightness, dizziness, and generalized weakness. States she was not exerting herself that day. She has been previously hospitalized for SOB that was dx'd as acute exacerbation of her asthma, but has not been hospitalized for CHF in the past. Her albuterol inhaler has not improved symptoms during this current episode. Pt has several drug allergies on record, which she confirms. Denies fever, chills, N/V/D.     Pt has a history of porcine aortic valve replacement 11/2019, for which she is on anticoagulant therapy. Surgical history of multiple stents, hernia repair, hysterectomy. Prior TIA.    In the ED, gave her a dose of 125mg solumedrol, duoneb, and 80mg of lasix. Pt arrived tachypneic, hypertensive to 192/77, and satting 88% on RA, then placed on 2L NC and satting 97%.     Past Medical History:  Past Medical History:   Diagnosis Date    Acute on chronic diastolic (congestive) heart failure 11/20/2019    Anticoagulant long-term use     on Plavix since May 2015    Anxiety     Arthritis     Asthma     Atrial fibrillation     Atrial fibrillation  with RVR 10/19/2020    Cataracts, bilateral     Chest pain, atypical     Chronic atrial fibrillation with rapid ventricular response 6/23/2017    Colon polyp     Coronary artery disease     Diabetes mellitus     Dry eyes     Esophageal erosions     GERD (gastroesophageal reflux disease)     Heart murmur     Hemorrhoid     High cholesterol     Hypertension     Irritable bowel syndrome     Paroxysmal atrial fibrillation 10/30/2020    Persistent atrial fibrillation 2/10/2020    Shingles 2015    Stenosis of aortic and mitral valves     Stroke     TIA (transient ischemic attack)     Use of cane as ambulatory aid        Past Surgical History:  Past Surgical History:   Procedure Laterality Date    ABDOMINAL SURGERY      stents to SMA    angiocele      2007, 2014    AORTIC VALVE REPLACEMENT N/A 11/19/2019    Procedure: Replacement-valve-aortic;  Surgeon: Jack Capone MD;  Location: [facility] CATH LAB;  Service: Cardiology;  Laterality: N/A;    BREAST SURGERY      left--- a lump--- no cancer    CARDIAC VALVE SURGERY      COLONOSCOPY  2014    COLONOSCOPY N/A 3/14/2018    Procedure: COLONOSCOPY;  Surgeon: Efren Kemp MD;  Location: [facility] ENDO ([address] [place] [address]);  Service: Endoscopy;  Laterality: N/A;  ok to hold Plavix 5 days prior to procedure per Dr RESHMA Hernandez     ok per Dr Kemp to hold Eliquis 2 days prior to procedure (see telephone encounter dated-2/7/18)    HERNIA REPAIR      HYSTERECTOMY  partial    1982 partial hysterectomy    LEFT HEART CATHETERIZATION Right 11/5/2019    Procedure: Left heart cath;  Surgeon: Jack Capone MD;  Location: [facility] CATH LAB;  Service: Cardiology;  Laterality: Right;    left nasal polyp      left neck lymph node      nose polyp      right hip fatty mass tissue      stent to small intestine      SUPERIOR VENA CAVA ANGIOPLASTY / STENTING      TONSILLECTOMY      TOTAL ABDOMINAL HYSTERECTOMY      2014    TOTAL KNEE ARTHROPLASTY Bilateral     TREATMENT OF CARDIAC ARRHYTHMIA N/A 2/10/2020    Procedure:  CARDIOVERSION;  Surgeon: Jayy Parrish MD;  Location: [facility] EP LAB;  Service: Cardiology;  Laterality: N/A;  AF, PEDRO, DCCV, MAC, DM, 3 Prep    TREATMENT OF CARDIAC ARRHYTHMIA N/A 5/15/2020    Procedure: CARDIOVERSION;  Surgeon: Hany Bee MD;  Location: [facility] EP LAB;  Service: Cardiology;  Laterality: N/A;  AF, PEDRO, DCCV, MAC, DM, 3 Prep    UPPER GASTROINTESTINAL ENDOSCOPY  2014       Allergies:  Review of patient's allergies indicates:   Allergen Reactions    Celebrex [celecoxib] Shortness Of Breath    Ciprofloxacin Swelling     lip    Dicyclomine Hives    Adhesive Dermatitis    Avelox [moxifloxacin] Swelling    Cilostazol Other (See Comments)     Elevates blood pressure    Eryc [erythromycin] Other (See Comments)     unknown    Iodine and iodide containing products Hives    Keflex [cephalexin] Hives    Meclomen      rashes    Meloxicam      Ear ringing    Metoclopramide Other (See Comments)     High blood pressure    Sulfa (sulfonamide antibiotics) Itching       Home Medications:  Prior to Admission medications    Medication Sig Start Date End Date Taking? Authorizing Provider   acetaminophen (TYLENOL) 650 MG TbSR Take 1,300 mg by mouth 2 (two) times daily as needed.   Yes Historical Provider   albuterol (PROVENTIL) 2.5 mg /3 mL (0.083 %) nebulizer solution Take 3 mLs (2.5 mg total) by nebulization every 4 (four) hours as needed (wheezing or SOB). 8/12/22 8/12/23 Yes Ruddy Funes MD   albuterol-ipratropium (DUO-NEB) 2.5 mg-0.5 mg/3 mL nebulizer solution NEBULIZE 1 VIAL EVERY 4 HOURS AS NEEDED 10/26/20  Yes Historical Provider   apixaban (ELIQUIS) 5 mg Tab Take 1 tablet (5 mg total) by mouth 2 (two) times daily. 8/12/22 8/12/23 Yes Ruddy Funes MD   ascorbic acid, vitamin C, (VITAMIN C) 500 MG tablet Take 1,000 mg by mouth once daily.    Yes Historical Provider   atorvastatin (LIPITOR) 10 MG tablet Take 1 tablet (10 mg total) by mouth once daily. 8/12/22 8/12/23 Yes Ruddy Funes MD   bisacodyL (DULCOLAX) 5 mg EC  tablet Take 1 tablet (5 mg total) by mouth daily as needed for Constipation. 8/12/22 8/12/23 Yes Ruddy Funes MD   calcium carbonate/vitamin D3 (CALTRATE 600 + D ORAL) Take 1 tablet by mouth once daily.   Yes Historical Provider   carvediloL (COREG) 6.25 MG tablet Take 1 tablet (6.25 mg total) by mouth 2 (two) times daily. 8/12/22 8/12/23 Yes Ruddy Funes MD   clopidogreL (PLAVIX) 75 mg tablet Take 1 tablet (75 mg total) by mouth once daily. 8/12/22 8/12/23 Yes Ruddy Funes MD   cyanocobalamin (VITAMIN B-12) 1000 MCG tablet Take 1 tablet (1,000 mcg total) by mouth once daily. 8/12/22 8/12/23 Yes Ruddy Funes MD   diltiaZEM (CARDIZEM CD) 240 MG 24 hr capsule Take 1 capsule (240 mg total) by mouth once daily. 8/12/22 8/12/23 Yes Ruddy Funes MD   DULoxetine (CYMBALTA) 20 MG capsule Take 1 capsule (20 mg total) by mouth once daily. 8/12/22 8/12/23 Yes Ruddy Funes MD   fexofenadine (ALLEGRA) 60 MG tablet Take 3 tablets (180 mg total) by mouth every morning. 8/12/22 8/12/23 Yes Ruddy Funes MD   fish oil-omega-3 fatty acids 300-1,000 mg capsule Take 1 g by mouth once daily.    Yes Historical Provider   fluticasone propionate (FLONASE) 50 mcg/actuation nasal spray 1 spray (50 mcg total) by Each Nostril route every morning. 8/12/22 8/12/23 Yes Ruddy Funes MD   gabapentin (NEURONTIN) 300 MG capsule Take 2 capsules (600 mg total) by mouth 3 (three) times daily. 8/12/22 8/12/23 Yes Ruddy Funes MD   isosorbide mononitrate (IMDUR) 60 MG 24 hr tablet Take 1 tablet (60 mg total) by mouth once daily. 8/12/22 8/12/23 Yes Ruddy Funes MD   LORazepam (ATIVAN) 0.5 MG tablet Take 1 tablet (0.5 mg total) by mouth every morning. 8/12/22 11/10/22 Yes Ruddy Funes MD   losartan (COZAAR) 25 MG tablet Take 1 tablet (25 mg total) by mouth once daily. 8/12/22 8/12/23 Yes Ruddy Funes MD   metFORMIN (GLUCOPHAGE) 500 MG tablet Take 1 tablet (500 mg total) by mouth 2 (two) times daily. 8/12/22 8/12/23 Yes Rudyd Funes MD   montelukast (SINGULAIR) 10 mg tablet Take 10  mg by mouth once daily. 3/4/21  Yes Historical Provider   mv-mn/folic ac/calcium/vit K1 (WOMEN'S 50 PLUS MULTIVITAMIN ORAL) Take 1 tablet by mouth once daily.   Yes Historical Provider   nitroGLYCERIN (NITROSTAT) 0.4 MG SL tablet Place 1 tablet (0.4 mg total) under the tongue every 5 (five) minutes as needed for Chest pain. 22 Yes Ruddy Funes MD   oxybutynin (DITROPAN-XL) 5 MG TR24 Take 1 tablet (5 mg total) by mouth every other day. 22 Yes Ruddy Funes MD   pantoprazole (PROTONIX) 40 MG tablet Take 1 tablet (40 mg total) by mouth once daily. 22  Yes Ruddy Funes MD   POLYSORBATE 80/GLYCERIN (REFRESH DRY EYE THERAPY OPHT) Apply to eye 2 (two) times daily.   Yes Historical Provider   potassium gluconate 550 mg (90 mg) Tab Take 180 mg by mouth 2 (two) times a day.   Yes Historical Provider   ranolazine (RANEXA) 500 MG Tb12 Take 1 tablet (500 mg total) by mouth 2 (two) times daily. 22 Yes Ruddy Funes MD   SYMBICORT 160-4.5 mcg/actuation HFAA Inhale 1 puff into the lungs 2 (two) times daily. 22  Yes Historical Provider   vitamin D (VITAMIN D3) 1000 units Tab Take 2,000 Units by mouth once daily.   Yes Historical Provider   methocarbamoL (ROBAXIN) 500 MG Tab Take 500 mg by mouth. 22 Yes Historical Provider   nystatin (MYCOSTATIN) powder Apply topically every 8 (eight) hours. 22 Yes Ruddy Funes MD   ondansetron (ZOFRAN-ODT) 4 MG TbDL Take 1 tablet (4 mg total) by mouth every 8 (eight) hours as needed (nausea). 22 Yes Ruddy Funes MD   furosemide (LASIX) 40 MG tablet Take 1 tablet (40 mg total) by mouth 2 (two) times a day. 22  Ruddy Funes MD   levalbuterol (XOPENEX) 1.25 mg/3 mL nebulizer solution SMARTSI Vial(s) Via Nebulizer 22  Historical Provider   lisinopriL 10 MG tablet Take 10 mg by mouth once daily. 22  Historical Provider       Family History:  Family History   Problem Relation Age of Onset     Heart attack Mother     Stroke Father     Heart failure Brother     Colon cancer Neg Hx     Inflammatory bowel disease Neg Hx        Social History:  Social History     Tobacco Use    Smoking status: Never    Smokeless tobacco: Never   Substance Use Topics    Alcohol use: No    Drug use: No       Review of Systems:  Constitutional: negative for chills, fevers, and night sweats  Eyes: negative for icterus and irritation  Ears, nose, mouth, throat, and face: negative for nasal congestion and sore throat  Respiratory: positive for cough and wheezing  Cardiovascular: positive for chest pain, fatigue, and lower extremity edema  Gastrointestinal: negative for abdominal pain, constipation, and diarrhea  Musculoskeletal:negative  Neurological: positive for dizziness, No focal deficit  Endocrine: negative  Allergic/Immunologic: negative for anaphylaxis and urticaria All other systems are reviewed and are negative.    Health Maintaince :   Primary Care Physician: Dr. Krzysztof Mcgraw    Immunizations:   TDap not UTD - 60  Flu UTD 22  Pna UTD 09/20/15  COVID x4    Cancer Screening:  Mammo: 22  Colonoscopy: 16, UTD and no repeat recommended     Objective:   Last 24 Hour Vital Signs:  BP  Min: 139/63  Max: 192/77  Temp  Av.9 °F (37.2 °C)  Min: 98.9 °F (37.2 °C)  Max: 98.9 °F (37.2 °C)  Pulse  Av.7  Min: 69  Max: 73  Resp  Av.4  Min: 17  Max: 28  SpO2  Av.9 %  Min: 88 %  Max: 98 %  There is no height or weight on file to calculate BMI.  No intake/output data recorded.    Physical Examination:  BP (!) 175/76   Pulse 69   Temp 98.9 °F (37.2 °C) (Oral)   Resp 17   LMP 1973 (LMP Unknown)   SpO2 98%   General appearance: alert, appears stated age, cooperative, and no distress  Head: Normocephalic, without obvious abnormality, atraumatic  Eyes: conjunctivae/corneas clear. PERRL, EOM's intact.  Nose: Nares normal. Septum midline. Mucosa normal. No drainage or sinus tenderness. 2L NC  in place  Neck: no JVD, supple, symmetrical, trachea midline, and no notable goiter  Lungs: rales R>L and wheezes R>L , normal chest excursion, tachypnea  Heart: regular rate and rhythm, S1, S2 normal, no murmur, click, rub or gallop  Abdomen: soft, non-tender; bowel sounds normal; no masses,  no organomegaly   Neuro: ANOx3, CN II-XII grossly intact, normal-appearing coordination and motor function     Laboratory:  Most Recent Data:  CBC:   Lab Results   Component Value Date    WBC 12.48 09/30/2022    HGB 12.1 09/30/2022    HCT 36.4 (L) 09/30/2022     09/30/2022    MCV 95 09/30/2022    RDW 14.1 09/30/2022       BMP:   Lab Results   Component Value Date     09/30/2022    K 3.6 09/30/2022    CL 97 09/30/2022    CO2 25 09/30/2022    BUN 16 09/30/2022    CREATININE 0.7 09/30/2022     (H) 09/30/2022    CALCIUM 9.1 09/30/2022    MG 1.5 (L) 08/11/2022    PHOS 3.9 08/11/2022     LFTs:   Lab Results   Component Value Date    PROT 7.2 09/30/2022    ALBUMIN 3.6 09/30/2022    BILITOT 1.0 09/30/2022    AST 23 09/30/2022    ALKPHOS 40 (L) 09/30/2022    ALT 23 09/30/2022     Coags:   Lab Results   Component Value Date    INR 1.0 10/19/2020     FLP:   Lab Results   Component Value Date    CHOL 168 08/11/2022    HDL 69 08/11/2022    LDLCALC 87.0 08/11/2022    TRIG 60 08/11/2022    CHOLHDL 41.1 08/11/2022     DM:   Lab Results   Component Value Date    HGBA1C 5.8 (H) 08/11/2022    HGBA1C 5.9 (H) 10/20/2020    HGBA1C 6.5 (H) 11/20/2019    LDLCALC 87.0 08/11/2022    CREATININE 0.7 09/30/2022     Thyroid:   Lab Results   Component Value Date    TSH 0.252 (L) 08/11/2022    FREET4 1.03 08/11/2022     Anemia:   Lab Results   Component Value Date    IRON 42 (L) 08/24/2016    TIBC 462 (H) 08/24/2016    FERRITIN 16 (L) 07/01/2016    SHHKZRPY81 846 02/19/2019    FOLATE >24.0 08/24/2016     Cardiac:   Lab Results   Component Value Date    TROPONINI 0.017 09/30/2022     (H) 09/30/2022     Urinalysis:   Lab Results  "  Component Value Date    LABURIN KLEBSIELLA PNEUMONIAE  >100,000 cfu/ml   (A) 10/19/2020    COLORU Yellow 12/04/2021    SPECGRAV 1.015 12/04/2021    NITRITE Negative 12/04/2021    KETONESU Negative 12/04/2021    UROBILINOGEN Negative 12/04/2021    WBCUA 2 08/17/2021       Trended Lab Data:  Recent Labs   Lab 09/30/22  0957   WBC 12.48   HGB 12.1   HCT 36.4*      MCV 95   RDW 14.1      K 3.6   CL 97   CO2 25   BUN 16   CREATININE 0.7   *   PROT 7.2   ALBUMIN 3.6   BILITOT 1.0   AST 23   ALKPHOS 40*   ALT 23       Trended Cardiac Data:  Recent Labs   Lab 09/30/22  0957   TROPONINI 0.017   *       Radiology:  Imaging Results              CT Chest Without Contrast (In process)                      X-Ray Chest AP Portable (Final result)  Result time 09/30/22 10:47:05      Final result by Edilberto Cook MD (09/30/22 10:47:05)                   Impression:      No detrimental change when compared with 08/11/2022.      Electronically signed by: Edilberto Cook MD  Date:    09/30/2022  Time:    10:47               Narrative:    EXAMINATION:  XR CHEST AP PORTABLE    CLINICAL HISTORY:  Provided history is "Chest Pain;  ".    TECHNIQUE:  One view of the chest.    COMPARISON:  08/11/2022.    FINDINGS:  Cardiac wires overlie the chest.  Left chest wall cardiac pacing device is present with transvenous leads overlying the heart.  Right hemidiaphragm is elevated.  Postoperative changes of TAVR.  Cardiac silhouette is enlarged and similar to the prior study.  Atherosclerotic calcifications overlie the aortic arch.  Lung volumes are relatively low, accentuating basilar markings.  No confluent area of consolidation identified.  No large pleural effusion.  No pneumothorax.  No detrimental change when compared with the prior study.                                         Assessment:     Adriana Strong is a 77 y.o. female with CHF, Afib, pacemaker, COPD, asthma, T2DM, and Hx of procine aortic valve " replacement with 4 days of shortness of breath a/w nonproductive cough, chest pain/tightness, and weakness/dizziness. Pt presenting with BLE edema, SOB, and hypoxia to the ED, crackles and wheezing auscultated on exam. no relief of sx with albuterol. Consistent with mixed picture of CHF and asthma exacerbation, will give steroids and supp O2. Complicated cardiac hx, pending cardiology recs, TTE, and symptomatic relief.      Plan:     Acute on chronic HFpEF (LVEF 55%) with hypoxia  Chronic Afib (on eliquis)  S/p Pacemaker  Aortic stenosis s/p Porcine TAVR (on plavix)  - current home meds coreg, diltiazem, imdur, ranolazine, and has nitro prn  - given 80mg IV lasix, SL nitro in ED   - pt satted 88% on RA, placed on 2L NC with improvement of SOB/tachypnea  - pt with 700cc UOP after 80mg IV Lasix   - , negative troponin, slightly elevated PCO2 on VBG  - continuing lasix 40mg bid, strict I&Os, daily weights  - unlikely to be angina despite chest tightness, negative troponin and negative stress test on 9/14  - ordered TTE  - [facility] consulted (Cardiologist: Kodi)    ?Acute exacerbation of asthma (adult-onset) vs. COPD  - currently on duo-neb, singulair, symbicort, and albuterol prn  - given methylprednisolone, duo-neb in ED   - pt wheezing with crackles on exam, possible mixed picture of fluid overload and asthma vs. COPD exacerbation  - pt reports that her asthma is adult-onset and has had previous hospitalizations for exacerbations in the past that required ICU level care  - pt presently on 2L NC with no previous home supplemental O2 support  - Recommend repeat OP PFTs to further delineate respiratory disease process     Leukocytosis (WBC 12.4k)  Elevated Lactate (2.4)  Mild bandemia (.08)  - pt with mild leukocytosis and bandemia and elevated lactate  - pt afebrile with no productive cough, subjective fever/chills, or recent sick contacts as pt primarily stays at home   - lactate can be possibly  associated with acute respiratory distress  - CT Chest shows ill-defined airspace nodules seen throughout both lungs, most concerning for infection but possibly due to aspiration or alveolar edema  - questionable infectious process involvement, will cover with doxy monotherapy (secondary to pt's cephalexin, macrolide, and fluoroquinolone allergies). Pro-cheikh negative.  - will be difficult to accurately trend values given steroid administration, but will continue to monitor for any infectious symptoms  - Pt reports issues with intermittent aspiration, consulted SLP for swallow eval    Hx Lacunar CVA  HLD  - history of TIAs  - currently on plavix, eliquis, and atorvastatin, continuing while inpatient    T2DM  Diabetic Polyneuropathy  - currently on metformin, gabapentin  - order A1c  - low dose SSI with POCT glucose ACHS  - continue gabapentin at reduced dose while inpatient     HTN  - continue home meds carvedilol, losartan, lasix    GERD  - currently on caltrate, protonix  - continue protonix while inpatient    Chronic Mesenteric Ischemia  Fatty Liver Disease  - no inpatient management needs     Diet: Diabetic, cardiac diet  DVT ppx: on eliquis  Code: FULL  Dispo: pending TTE, diuresis, improvement of hypoxia, and further cardiac recs      Aure Frazier, DO  [facility] Medicine PGY1  Medicine Service - Team B      [facility] Medicine Hospitalist Pager numbers:   [facility] Hospitalist Medicine Team A (Rajinder/Isis): [phone]  [facility] Hospitalist Medicine Team B (Antoni/Carleen):  [phone]

## 2022-09-30 NOTE — CONSULTS
[place] - Emergency Dept  Cardiology  Consult Note    Patient Name: Adriana Strong  MRN: [id number]  Admission Date: 9/30/2022  Hospital Length of Stay: 0 days  Code Status: Full Code   Attending Provider: Jarrod Corrales MD   Consulting Provider: Kris Avilez NP  Primary Care Physician: Krzysztof Mcgraw MD  Principal Problem:Acute exacerbation of CHF (congestive heart failure)    Patient information was obtained from patient, past medical records and ER records.     Inpatient consult to Cardiology[facility]  Consult performed by: Kris Avilez NP  Consult ordered by: Hany Figueroa MD        Subjective:     Chief Complaint:  SOB     HPI:   Adriana Strong is a 77 year old female with CAD, HLD, HTN. AS s/p TAVR, DM, CVA, CHF, AF and PPM. Patient presented to the ED with SOB, cough, wheezing x 3 days. She reports periods of chronic dizziness that have been stable without increasing frequency recently. Mild BLE edema reported at baseline. Patient also reports stable WHITLEY. She denies CP, diaphoresis, palpitations, syncope, NV. She takes Eliquis for AF.  EKG paced   Initial troponin negative     CXR mild pulmonary vascular congestion   CT chest pending        [facility] 2019 prior to TAVR:  1.  The LVH made coronary angiography very difficult.  A 10 cc was required for coronary angiograms.  2.  The left main was free of disease.  It was very short making cannulation of the LAD difficulty.  3.  The LAD had a long tubular stenosis of 70% at the diagonal bifurcation.  The diagonal was a larger vessel than the LAD making intervention on the LAD suboptimal.  4.  The left circumflex was normal.  5.  The right coronary artery was normal.    TTE 08/2022   The left ventricle is normal in size with normal systolic function.   The estimated ejection fraction is 55%.   Grade III left ventricular diastolic dysfunction.   Normal right ventricular size with normal right ventricular systolic function.   There is abnormal  septal wall motion consistent with right ventricular pacemaker.   Moderate left atrial enlargement.   There is a 26 mm S3 transcutaneously-placed aortic bioprosthesis present. There is no aortic insufficiency present. Prosthetic aortic valve is normal.   The aortic valve mean gradient is 8 mmHg with a dimensionless index of 0.68.   The estimated PA systolic pressure is 53 mmHg.   Normal central venous pressure (3 mmHg).   There is pulmonary hypertension.     PET Stress 09/14/2022    The myocardial perfusion images are normal without evidence of ischemia or scar.    The whole heart absolute myocardial perfusion values averaged 0.67 cc/min/g at rest, which is normal; 0.91 cc/min/g at stress, which is moderately reduced; and CFR is 1.36 , which is moderately reduced.    Absolute flow measurements are consistent with abnormal microvascular function and diffuse non-obstructive CAD.    CT attenuation images demonstrate severe diffuse coronary calcifications in the LAD, LCX and RCA territory and moderate diffuse aortic calcifications in the ascending aorta, descending aorta and aortic arch.    The gated perfusion images showed an ejection fraction of 64% at rest and 64% during stress. A normal ejection fraction is greater than 53%.    The wall motion is normal at rest and during stress.    The LV cavity size is normal at rest and stress.    The EKG portion of this study is uninterpretable.    There were no arrhythmias during stress.    The patient reported chest pain during the stress test.    There are no prior studies for comparison.        Past Medical History:   Diagnosis Date    Acute on chronic diastolic (congestive) heart failure 11/20/2019    Anticoagulant long-term use     on Plavix since May 2015    Anxiety     Arthritis     Asthma     Atrial fibrillation     Atrial fibrillation with RVR 10/19/2020    Cataracts, bilateral     Chest pain, atypical     Chronic atrial fibrillation with  rapid ventricular response 6/23/2017    Colon polyp     Coronary artery disease     Diabetes mellitus     Dry eyes     Esophageal erosions     GERD (gastroesophageal reflux disease)     Heart murmur     Hemorrhoid     High cholesterol     Hypertension     Irritable bowel syndrome     Paroxysmal atrial fibrillation 10/30/2020    Persistent atrial fibrillation 2/10/2020    Shingles 2015    Stenosis of aortic and mitral valves     Stroke     TIA (transient ischemic attack)     Use of cane as ambulatory aid        Past Surgical History:   Procedure Laterality Date    ABDOMINAL SURGERY      stents to SMA    angiocele      2007, 2014    AORTIC VALVE REPLACEMENT N/A 11/19/2019    Procedure: Replacement-valve-aortic;  Surgeon: Jack Capone MD;  Location: [facility] CATH LAB;  Service: Cardiology;  Laterality: N/A;    BREAST SURGERY      left--- a lump--- no cancer    CARDIAC VALVE SURGERY      COLONOSCOPY  2014    COLONOSCOPY N/A 3/14/2018    Procedure: COLONOSCOPY;  Surgeon: Efren Kemp MD;  Location: [facility] ENDO ([address] [place] [address]);  Service: Endoscopy;  Laterality: N/A;  ok to hold Plavix 5 days prior to procedure per Dr RESHMA Hernandez     ok per Dr Kemp to hold Eliquis 2 days prior to procedure (see telephone encounter dated-2/7/18)    HERNIA REPAIR      HYSTERECTOMY  partial    1982 partial hysterectomy    LEFT HEART CATHETERIZATION Right 11/5/2019    Procedure: Left heart cath;  Surgeon: Jack Capone MD;  Location: [facility] CATH LAB;  Service: Cardiology;  Laterality: Right;    left nasal polyp      left neck lymph node      nose polyp      right hip fatty mass tissue      stent to small intestine      SUPERIOR VENA CAVA ANGIOPLASTY / STENTING      TONSILLECTOMY      TOTAL ABDOMINAL HYSTERECTOMY      2014    TOTAL KNEE ARTHROPLASTY Bilateral     TREATMENT OF CARDIAC ARRHYTHMIA N/A 2/10/2020    Procedure: CARDIOVERSION;  Surgeon: Jayy Parrish MD;  Location: [facility] EP LAB;  Service: Cardiology;   Laterality: N/A;  AF, PEDRO, DCCV, MAC, DM, 3 Prep    TREATMENT OF CARDIAC ARRHYTHMIA N/A 5/15/2020    Procedure: CARDIOVERSION;  Surgeon: Hany Bee MD;  Location: [facility] EP LAB;  Service: Cardiology;  Laterality: N/A;  AF, PEDRO, DCCV, MAC, DM, 3 Prep    UPPER GASTROINTESTINAL ENDOSCOPY  2014       Review of patient's allergies indicates:   Allergen Reactions    Celebrex [celecoxib] Shortness Of Breath    Ciprofloxacin Swelling     lip    Dicyclomine Hives    Adhesive Dermatitis    Avelox [moxifloxacin] Swelling    Cilostazol Other (See Comments)     Elevates blood pressure    Eryc [erythromycin] Other (See Comments)     unknown    Iodine and iodide containing products Hives    Keflex [cephalexin] Hives    Meclomen      rashes    Meloxicam      Ear ringing    Metoclopramide Other (See Comments)     High blood pressure    Sulfa (sulfonamide antibiotics) Itching       Current Facility-Administered Medications on File Prior to Encounter   Medication    0.9%  NaCl infusion    sodium chloride 0.9% flush 5 mL     Current Outpatient Medications on File Prior to Encounter   Medication Sig    acetaminophen (TYLENOL) 650 MG TbSR Take 1,300 mg by mouth 2 (two) times daily as needed.    albuterol (PROVENTIL) 2.5 mg /3 mL (0.083 %) nebulizer solution Take 3 mLs (2.5 mg total) by nebulization every 4 (four) hours as needed (wheezing or SOB).    albuterol-ipratropium (DUO-NEB) 2.5 mg-0.5 mg/3 mL nebulizer solution NEBULIZE 1 VIAL EVERY 4 HOURS AS NEEDED    apixaban (ELIQUIS) 5 mg Tab Take 1 tablet (5 mg total) by mouth 2 (two) times daily.    ascorbic acid, vitamin C, (VITAMIN C) 500 MG tablet Take 1,000 mg by mouth once daily.     atorvastatin (LIPITOR) 10 MG tablet Take 1 tablet (10 mg total) by mouth once daily.    bisacodyL (DULCOLAX) 5 mg EC tablet Take 1 tablet (5 mg total) by mouth daily as needed for Constipation.    calcium carbonate/vitamin D3 (CALTRATE 600 + D ORAL) Take 1 tablet by mouth once  daily.    carvediloL (COREG) 6.25 MG tablet Take 1 tablet (6.25 mg total) by mouth 2 (two) times daily.    clopidogreL (PLAVIX) 75 mg tablet Take 1 tablet (75 mg total) by mouth once daily.    cyanocobalamin (VITAMIN B-12) 1000 MCG tablet Take 1 tablet (1,000 mcg total) by mouth once daily.    diltiaZEM (CARDIZEM CD) 240 MG 24 hr capsule Take 1 capsule (240 mg total) by mouth once daily.    DULoxetine (CYMBALTA) 20 MG capsule Take 1 capsule (20 mg total) by mouth once daily.    fexofenadine (ALLEGRA) 60 MG tablet Take 3 tablets (180 mg total) by mouth every morning.    fish oil-omega-3 fatty acids 300-1,000 mg capsule Take 1 g by mouth once daily.     fluticasone propionate (FLONASE) 50 mcg/actuation nasal spray 1 spray (50 mcg total) by Each Nostril route every morning.    gabapentin (NEURONTIN) 300 MG capsule Take 2 capsules (600 mg total) by mouth 3 (three) times daily.    isosorbide mononitrate (IMDUR) 60 MG 24 hr tablet Take 1 tablet (60 mg total) by mouth once daily.    LORazepam (ATIVAN) 0.5 MG tablet Take 1 tablet (0.5 mg total) by mouth every morning.    losartan (COZAAR) 25 MG tablet Take 1 tablet (25 mg total) by mouth once daily.    metFORMIN (GLUCOPHAGE) 500 MG tablet Take 1 tablet (500 mg total) by mouth 2 (two) times daily.    montelukast (SINGULAIR) 10 mg tablet Take 10 mg by mouth once daily.    mv-mn/folic ac/calcium/vit K1 (WOMEN'S 50 PLUS MULTIVITAMIN ORAL) Take 1 tablet by mouth once daily.    nitroGLYCERIN (NITROSTAT) 0.4 MG SL tablet Place 1 tablet (0.4 mg total) under the tongue every 5 (five) minutes as needed for Chest pain.    oxybutynin (DITROPAN-XL) 5 MG TR24 Take 1 tablet (5 mg total) by mouth every other day.    pantoprazole (PROTONIX) 40 MG tablet Take 1 tablet (40 mg total) by mouth once daily.    POLYSORBATE 80/GLYCERIN (REFRESH DRY EYE THERAPY OPHT) Apply to eye 2 (two) times daily.    potassium gluconate 550 mg (90 mg) Tab Take 180 mg by mouth 2 (two) times a  day.    ranolazine (RANEXA) 500 MG Tb12 Take 1 tablet (500 mg total) by mouth 2 (two) times daily.    SYMBICORT 160-4.5 mcg/actuation HFAA Inhale 1 puff into the lungs 2 (two) times daily.    vitamin D (VITAMIN D3) 1000 units Tab Take 2,000 Units by mouth once daily.    [DISCONTINUED] methocarbamoL (ROBAXIN) 500 MG Tab Take 500 mg by mouth.    [DISCONTINUED] nystatin (MYCOSTATIN) powder Apply topically every 8 (eight) hours.    [DISCONTINUED] ondansetron (ZOFRAN-ODT) 4 MG TbDL Take 1 tablet (4 mg total) by mouth every 8 (eight) hours as needed (nausea).    furosemide (LASIX) 40 MG tablet Take 1 tablet (40 mg total) by mouth 2 (two) times a day.    [DISCONTINUED] levalbuterol (XOPENEX) 1.25 mg/3 mL nebulizer solution SMARTSI Vial(s) Via Nebulizer    [DISCONTINUED] lisinopriL 10 MG tablet Take 10 mg by mouth once daily.     Family History       Problem Relation (Age of Onset)    Heart attack Mother    Heart failure Brother    Stroke Father          Tobacco Use    Smoking status: Never    Smokeless tobacco: Never   Substance and Sexual Activity    Alcohol use: No    Drug use: No    Sexual activity: Not Currently     Review of Systems   Constitutional: Positive for malaise/fatigue. Negative for diaphoresis and fever.   HENT:  Positive for congestion and hoarse voice.    Eyes: Negative.    Cardiovascular:  Positive for dyspnea on exertion and leg swelling. Negative for chest pain, near-syncope, orthopnea, palpitations, paroxysmal nocturnal dyspnea and syncope.   Respiratory:  Positive for cough, shortness of breath and wheezing.    Endocrine: Negative.    Skin: Negative.    Musculoskeletal: Negative.    Gastrointestinal: Negative.    Genitourinary: Negative.    Neurological: Negative.    Psychiatric/Behavioral: Negative.     Allergic/Immunologic: Negative.    Objective:     Vital Signs (Most Recent):  Temp: 98.9 °F (37.2 °C) (22 0916)  Pulse: 69 (22 1208)  Resp: 17 (22 1208)  BP: 139/63  (09/30/22 1140)  SpO2: 98 % (09/30/22 1208) Vital Signs (24h Range):  Temp:  [98.9 °F (37.2 °C)] 98.9 °F (37.2 °C)  Pulse:  [69-73] 69  Resp:  [17-28] 17  SpO2:  [88 %-98 %] 98 %  BP: (139-192)/(63-84) 139/63        There is no height or weight on file to calculate BMI.    SpO2: 98 %  O2 Device (Oxygen Therapy): nasal cannula    No intake or output data in the 24 hours ending 09/30/22 1315    Lines/Drains/Airways       Peripheral Intravenous Line  Duration                  Peripheral IV - Single Lumen 09/30/22 0958 20 G Left;Lateral;Posterior Forearm <1 day                    Physical Exam  Constitutional:       General: She is not in acute distress.     Appearance: She is obese. She is not diaphoretic.   HENT:      Head: Atraumatic.   Eyes:      General:         Right eye: No discharge.         Left eye: No discharge.   Cardiovascular:      Rate and Rhythm: Normal rate and regular rhythm.      Heart sounds: Murmur heard.   Pulmonary:      Effort: Pulmonary effort is normal.      Breath sounds: Wheezing present. No rales.   Abdominal:      General: Bowel sounds are normal.      Palpations: Abdomen is soft.   Musculoskeletal:      Right lower leg: Edema present.      Left lower leg: Edema present.   Skin:     General: Skin is warm and dry.      Capillary Refill: Capillary refill takes less than 2 seconds.   Neurological:      Mental Status: She is alert and oriented to person, place, and time.       Significant Labs: BMP:   Recent Labs   Lab 09/30/22  0957   *      K 3.6   CL 97   CO2 25   BUN 16   CREATININE 0.7   CALCIUM 9.1   , CMP   Recent Labs   Lab 09/30/22  0957      K 3.6   CL 97   CO2 25   *   BUN 16   CREATININE 0.7   CALCIUM 9.1   PROT 7.2   ALBUMIN 3.6   BILITOT 1.0   ALKPHOS 40*   AST 23   ALT 23   ANIONGAP 15   , CBC   Recent Labs   Lab 09/30/22  0957   WBC 12.48   HGB 12.1   HCT 36.4*      , Lipid Panel No results for input(s): CHOL, HDL, LDLCALC, TRIG, CHOLHDL in the  last 48 hours., Troponin   Recent Labs   Lab 09/30/22  0957   TROPONINI 0.017   , and All pertinent lab results from the last 24 hours have been reviewed.    Significant Imaging: Echocardiogram: Transthoracic echo (TTE) complete ([facility] Only):   Results for orders placed or performed during the hospital encounter of 08/11/22   Echo   Result Value Ref Range    BSA 2.26 m2    TDI SEPTAL 0.06 m/s    LV LATERAL E/E' RATIO 19.71 m/s    LV SEPTAL E/E' RATIO 23.00 m/s    LA WIDTH 4.00 cm    AORTIC VALVE CUSP SEPERATION 1.39 cm    TDI LATERAL 0.07 m/s    PV PEAK VELOCITY 0.87 cm/s    LVIDd 4.80 3.5 - 6.0 cm    IVS 0.97 0.6 - 1.1 cm    Posterior Wall 0.90 (A) 0.6 - 1.1 cm    Ao root annulus 2.50 cm    LVIDs 2.00 (A) 2.1 - 4.0 cm    FS 58 28 - 44 %    LA volume 101.56 cm3    LV mass 155.48 g    LA size 5.00 cm    RVDD 2.35 cm    TAPSE 1.50 cm    Left Ventricle Relative Wall Thickness 0.38 cm    AV mean gradient 8 mmHg    AV valve area 2.83 cm2    AV Velocity Ratio 0.63     AV index (prosthetic) 0.68     MV mean gradient 1 mmHg    MV valve area p 1/2 method 5.19 cm2    MV valve area by continuity eq 3.88 cm2    E/A ratio 2.88     Mean e' 0.07 m/s    E wave deceleration time 146.05 msec    IVRT 74.22 msec    LVOT diameter 2.30 cm    LVOT area 4.2 cm2    LVOT peak mazin 1.22 m/s    LVOT peak VTI 27.63 cm    Ao peak mazin 1.95 m/s    Ao VTI 40.49 cm    Mr max mazin 0.04 m/s    LVOT stroke volume 114.74 cm3    AV peak gradient 15 mmHg    MV peak gradient 6 mmHg    E/E' ratio 21.23 m/s    MV Peak E Mazin 1.38 m/s    TR Max Mazin 3.54 m/s    MV VTI 29.59 cm    MV stenosis pressure 1/2 time 42.36 ms    MV Peak A Mazin 0.48 m/s    LV Systolic Volume 73.34 mL    LV Systolic Volume Index 34.1 mL/m2    LV Diastolic Volume 123.09 mL    LV Diastolic Volume Index 57.25 mL/m2    LA Volume Index 47.2 mL/m2    LV Mass Index 72 g/m2    Left Atrium Minor Axis 6.30 cm    Left Atrium Major Axis 5.68 cm    Triscuspid Valve Regurgitation Peak Gradient 50 mmHg     LA Volume Index (Mod) 28.6 mL/m2    LA volume (mod) 61.58 cm3    Right Atrial Pressure (from IVC) 3 mmHg    EF 55 %    Right ventricular length in diastole (apical 4-chamber view) 5.60 cm    TV rest pulmonary artery pressure 53 mmHg    RA Major Axis 5.10 cm    RA Width 2.70 cm    Narrative    · The left ventricle is normal in size with normal systolic function.  · The estimated ejection fraction is 55%.  · Grade III left ventricular diastolic dysfunction.  · Normal right ventricular size with normal right ventricular systolic   function.  · There is abnormal septal wall motion consistent with right ventricular   pacemaker.  · Moderate left atrial enlargement.  · There is a 26 mm S3 transcutaneously-placed aortic bioprosthesis   present. There is no aortic insufficiency present. Prosthetic aortic valve   is normal.  · The aortic valve mean gradient is 8 mmHg with a dimensionless index of   0.68.  · The estimated PA systolic pressure is 53 mmHg.  · Normal central venous pressure (3 mmHg).  · There is pulmonary hypertension.        Assessment and Plan:     * Acute exacerbation of CHF (congestive heart failure)   08/11/22    Echo    Interpretation Summary  · The left ventricle is normal in size with normal systolic function.  · The estimated ejection fraction is 55%.  · Grade III left ventricular diastolic dysfunction.  · Normal right ventricular size with normal right ventricular systolic function.  · There is abnormal septal wall motion consistent with right ventricular pacemaker.  · Moderate left atrial enlargement.  · There is a 26 mm S3 transcutaneously-placed aortic bioprosthesis present. There is no aortic insufficiency present. Prosthetic aortic valve is normal.  · The aortic valve mean gradient is 8 mmHg with a dimensionless index of 0.68.  · The estimated PA systolic pressure is 53 mmHg.  · Normal central venous pressure (3 mmHg).  · There is pulmonary hypertension.    Recent Labs   Lab 09/30/22  0957   BNP  392*   .  CXR cw mild pulmonary edema, diuresed with 80 mg IV lasix in the ED then transitioned to Lasix 40 mg p.o. BID  Mixed picture with CHF and bronchitis exacerbation   CT chest pending   Continue BB, ARB    Chronic atrial fibrillation  Continue cardizem, BB, Eliquis  Presently paced in the ED     CAD (coronary artery disease)  09/2022 PET stress negative for scar or ischemia     [facility] 2019 prior to TAVR:  1.  The LVH made coronary angiography very difficult.  A 10 cc was required for coronary angiograms.  2.  The left main was free of disease.  It was very short making cannulation of the LAD difficulty.  3.  The LAD had a long tubular stenosis of 70% at the diagonal bifurcation.  The diagonal was a larger vessel than the LAD making intervention on the LAD suboptimal.  4.  The left circumflex was normal.  5.  The right coronary artery was normal.    Continue Plavix, BB, ARB, Imdur, Ranexa  No asa to decrease risk of bleeding with triple therapy     Morbid obesity  Weight loss encouraged    S/P TAVR (transcatheter aortic valve replacement)  08/22 TTE  · There is a 26 mm S3 transcutaneously-placed aortic bioprosthesis present. There is no aortic insufficiency present. Prosthetic aortic valve is normal.  · The aortic valve mean gradient is 8 mmHg with a dimensionless index of 0.68.    Severe persistent asthma with acute exacerbation  Mixed picture with asthma/bronchitis and CHF exacerbation   Management per primary  No rales on exam, audible wheeze noted and BLE edema which appears at baseline         VTE Risk Mitigation (From admission, onward)         Ordered     apixaban tablet 5 mg  2 times daily         09/30/22 1255     Reason for No Pharmacological VTE Prophylaxis  Once        Question:  Reasons:  Answer:  Already adequately anticoagulated on oral Anticoagulants    09/30/22 1255     IP VTE HIGH RISK PATIENT  Once         09/30/22 1255     Place sequential compression device  Until discontinued          09/30/22 7743                Thank you for your consult. I will follow-up with patient. Please contact us if you have any additional questions.    Kris Avilez NP  Cardiology   [place] - Emergency Dept

## 2022-09-30 NOTE — ASSESSMENT & PLAN NOTE
09/2022 PET stress negative for scar or ischemia     [facility] 2019 prior to TAVR:  1.  The LVH made coronary angiography very difficult.  A 10 cc was required for coronary angiograms.  2.  The left main was free of disease.  It was very short making cannulation of the LAD difficulty.  3.  The LAD had a long tubular stenosis of 70% at the diagonal bifurcation.  The diagonal was a larger vessel than the LAD making intervention on the LAD suboptimal.  4.  The left circumflex was normal.  5.  The right coronary artery was normal.    Continue Plavix, BB, ARB, Imdur, Ranexa  No asa to decrease risk of bleeding with triple therapy

## 2022-09-30 NOTE — ASSESSMENT & PLAN NOTE
08/11/22    Echo    Interpretation Summary  · The left ventricle is normal in size with normal systolic function.  · The estimated ejection fraction is 55%.  · Grade III left ventricular diastolic dysfunction.  · Normal right ventricular size with normal right ventricular systolic function.  · There is abnormal septal wall motion consistent with right ventricular pacemaker.  · Moderate left atrial enlargement.  · There is a 26 mm S3 transcutaneously-placed aortic bioprosthesis present. There is no aortic insufficiency present. Prosthetic aortic valve is normal.  · The aortic valve mean gradient is 8 mmHg with a dimensionless index of 0.68.  · The estimated PA systolic pressure is 53 mmHg.  · Normal central venous pressure (3 mmHg).  · There is pulmonary hypertension.    Recent Labs   Lab 09/30/22  0957   *   .  CXR cw mild pulmonary edema, diuresed with 80 mg IV lasix in the ED then transitioned to Lasix 40 mg p.o. BID  Mixed picture with CHF and bronchitis exacerbation   CT chest pending   Continue BB, ARB

## 2022-09-30 NOTE — Clinical Note
Diagnosis: Chest pain [609937]   Future Attending Provider: HALEY PENG [[id number]]   Admitting Provider:: HALEY PENG [[id number]]

## 2022-09-30 NOTE — ASSESSMENT & PLAN NOTE
08/22 TTE  · There is a 26 mm S3 transcutaneously-placed aortic bioprosthesis present. There is no aortic insufficiency present. Prosthetic aortic valve is normal.  · The aortic valve mean gradient is 8 mmHg with a dimensionless index of 0.68.

## 2022-09-30 NOTE — HPI
Adriana Strong is a 77 year old female with CAD, HLD, HTN. AS s/p TAVR, DM, CVA, CHF, AF and PPM. Patient presented to the ED with SOB, cough, wheezing x 3 days. She reports periods of chronic dizziness that have been stable without increasing frequency recently. Mild BLE edema reported at baseline. Patient also reports stable WHITLEY. She denies CP, diaphoresis, palpitations, syncope, NV. She takes Eliquis for AF.  EKG paced   Initial troponin negative     CXR mild pulmonary vascular congestion   CT chest pending        [facility] 2019 prior to TAVR:  1.  The LVH made coronary angiography very difficult.  A 10 cc was required for coronary angiograms.  2.  The left main was free of disease.  It was very short making cannulation of the LAD difficulty.  3.  The LAD had a long tubular stenosis of 70% at the diagonal bifurcation.  The diagonal was a larger vessel than the LAD making intervention on the LAD suboptimal.  4.  The left circumflex was normal.  5.  The right coronary artery was normal.    TTE 08/2022  The left ventricle is normal in size with normal systolic function.  The estimated ejection fraction is 55%.  Grade III left ventricular diastolic dysfunction.  Normal right ventricular size with normal right ventricular systolic function.  There is abnormal septal wall motion consistent with right ventricular pacemaker.  Moderate left atrial enlargement.  There is a 26 mm S3 transcutaneously-placed aortic bioprosthesis present. There is no aortic insufficiency present. Prosthetic aortic valve is normal.  The aortic valve mean gradient is 8 mmHg with a dimensionless index of 0.68.  The estimated PA systolic pressure is 53 mmHg.  Normal central venous pressure (3 mmHg).  There is pulmonary hypertension.     PET Stress 09/14/2022    The myocardial perfusion images are normal without evidence of ischemia or scar.    The whole heart absolute myocardial perfusion values averaged 0.67 cc/min/g at rest, which is normal;  0.91 cc/min/g at stress, which is moderately reduced; and CFR is 1.36 , which is moderately reduced.    Absolute flow measurements are consistent with abnormal microvascular function and diffuse non-obstructive CAD.    CT attenuation images demonstrate severe diffuse coronary calcifications in the LAD, LCX and RCA territory and moderate diffuse aortic calcifications in the ascending aorta, descending aorta and aortic arch.    The gated perfusion images showed an ejection fraction of 64% at rest and 64% during stress. A normal ejection fraction is greater than 53%.    The wall motion is normal at rest and during stress.    The LV cavity size is normal at rest and stress.    The EKG portion of this study is uninterpretable.    There were no arrhythmias during stress.    The patient reported chest pain during the stress test.    There are no prior studies for comparison.

## 2022-09-30 NOTE — ASSESSMENT & PLAN NOTE
Mixed picture with asthma/bronchitis and CHF exacerbation   Management per primary  No rales on exam, audible wheeze noted and BLE edema which appears at baseline

## 2022-09-30 NOTE — PLAN OF CARE
Problem: Adult Inpatient Plan of Care  Goal: Plan of Care Review  Outcome: Ongoing, Progressing      09/30/22 1513   Admission   Initial VN Admission Questions Complete  (Patient arrived to unit, AAOx4.  at bedside. Admission questions completed. Allowed time for questions. Instructed pt to call for assistance.)   Communication Issues? None   Shift   Virtual Nurse - Rounding Complete   Virtual Nurse - Patient Verbalized Approval Of Camera Use;VN Rounding   Type of Frequent Check   Type Patient Rounds   Safety/Activity   Patient Rounds call light in patient/parent reach;visualized patient   Safety Promotion/Fall Prevention Fall Risk reviewed with patient/family;family to remain at bedside;instructed to call staff for mobility   Pain/Comfort/Sleep   Sleep/Rest/Relaxation awake

## 2022-09-30 NOTE — PHARMACY MED REC
"  Admission Medication History     The home medication history was taken by Jazmín Denise CPhT.    Medication history obtained from, Patient's  Verified    You may go to "Admission" then "Reconcile Home Medications" tabs to review and/or act upon these items.     The home medication list has been updated by the Pharmacy department.   Please read ALL comments highlighted in yellow.   Please address this information as you see fit.    Feel free to contact us if you have any questions or require assistance.      The medications listed below were removed from the home medication list.  Please reorder if appropriate:  Patient reports no longer taking the following medication(s):  Xopenex 1.25 mg/3ml  Lisinopril 10 mg  Robaxin 500 mg        Jazmín Denise CPhT.  Ext [phone]             .        "

## 2022-09-30 NOTE — SUBJECTIVE & OBJECTIVE
Past Medical History:   Diagnosis Date    Acute on chronic diastolic (congestive) heart failure 11/20/2019    Anticoagulant long-term use     on Plavix since May 2015    Anxiety     Arthritis     Asthma     Atrial fibrillation     Atrial fibrillation with RVR 10/19/2020    Cataracts, bilateral     Chest pain, atypical     Chronic atrial fibrillation with rapid ventricular response 6/23/2017    Colon polyp     Coronary artery disease     Diabetes mellitus     Dry eyes     Esophageal erosions     GERD (gastroesophageal reflux disease)     Heart murmur     Hemorrhoid     High cholesterol     Hypertension     Irritable bowel syndrome     Paroxysmal atrial fibrillation 10/30/2020    Persistent atrial fibrillation 2/10/2020    Shingles 2015    Stenosis of aortic and mitral valves     Stroke     TIA (transient ischemic attack)     Use of cane as ambulatory aid        Past Surgical History:   Procedure Laterality Date    ABDOMINAL SURGERY      stents to SMA    angiocele      2007, 2014    AORTIC VALVE REPLACEMENT N/A 11/19/2019    Procedure: Replacement-valve-aortic;  Surgeon: Jack Capone MD;  Location: [facility] CATH LAB;  Service: Cardiology;  Laterality: N/A;    BREAST SURGERY      left--- a lump--- no cancer    CARDIAC VALVE SURGERY      COLONOSCOPY  2014    COLONOSCOPY N/A 3/14/2018    Procedure: COLONOSCOPY;  Surgeon: Efren Kemp MD;  Location: [facility] ENDO ([address] [place] [address]);  Service: Endoscopy;  Laterality: N/A;  ok to hold Plavix 5 days prior to procedure per Dr RESHMA Hernandez     ok per Dr Kemp to hold Eliquis 2 days prior to procedure (see telephone encounter dated-2/7/18)    HERNIA REPAIR      HYSTERECTOMY  partial    1982 partial hysterectomy    LEFT HEART CATHETERIZATION Right 11/5/2019    Procedure: Left heart cath;  Surgeon: Jack Capone MD;  Location: [facility] CATH LAB;  Service: Cardiology;  Laterality: Right;    left nasal polyp      left neck lymph node      nose polyp      right hip fatty mass tissue      stent to small  intestine      SUPERIOR VENA CAVA ANGIOPLASTY / STENTING      TONSILLECTOMY      TOTAL ABDOMINAL HYSTERECTOMY      2014    TOTAL KNEE ARTHROPLASTY Bilateral     TREATMENT OF CARDIAC ARRHYTHMIA N/A 2/10/2020    Procedure: CARDIOVERSION;  Surgeon: Jayy Parrish MD;  Location: [facility] EP LAB;  Service: Cardiology;  Laterality: N/A;  AF, PEDRO, DCCV, MAC, DM, 3 Prep    TREATMENT OF CARDIAC ARRHYTHMIA N/A 5/15/2020    Procedure: CARDIOVERSION;  Surgeon: Hany Bee MD;  Location: [facility] EP LAB;  Service: Cardiology;  Laterality: N/A;  AF, PEDRO, DCCV, MAC, DM, 3 Prep    UPPER GASTROINTESTINAL ENDOSCOPY  2014       Review of patient's allergies indicates:   Allergen Reactions    Celebrex [celecoxib] Shortness Of Breath    Ciprofloxacin Swelling     lip    Dicyclomine Hives    Adhesive Dermatitis    Avelox [moxifloxacin] Swelling    Cilostazol Other (See Comments)     Elevates blood pressure    Eryc [erythromycin] Other (See Comments)     unknown    Iodine and iodide containing products Hives    Keflex [cephalexin] Hives    Meclomen      rashes    Meloxicam      Ear ringing    Metoclopramide Other (See Comments)     High blood pressure    Sulfa (sulfonamide antibiotics) Itching       Current Facility-Administered Medications on File Prior to Encounter   Medication    0.9%  NaCl infusion    sodium chloride 0.9% flush 5 mL     Current Outpatient Medications on File Prior to Encounter   Medication Sig    acetaminophen (TYLENOL) 650 MG TbSR Take 1,300 mg by mouth 2 (two) times daily as needed.    albuterol (PROVENTIL) 2.5 mg /3 mL (0.083 %) nebulizer solution Take 3 mLs (2.5 mg total) by nebulization every 4 (four) hours as needed (wheezing or SOB).    albuterol-ipratropium (DUO-NEB) 2.5 mg-0.5 mg/3 mL nebulizer solution NEBULIZE 1 VIAL EVERY 4 HOURS AS NEEDED    apixaban (ELIQUIS) 5 mg Tab Take 1 tablet (5 mg total) by mouth 2 (two) times daily.    ascorbic acid, vitamin C, (VITAMIN C) 500 MG tablet Take 1,000 mg by mouth once  daily.     atorvastatin (LIPITOR) 10 MG tablet Take 1 tablet (10 mg total) by mouth once daily.    bisacodyL (DULCOLAX) 5 mg EC tablet Take 1 tablet (5 mg total) by mouth daily as needed for Constipation.    calcium carbonate/vitamin D3 (CALTRATE 600 + D ORAL) Take 1 tablet by mouth once daily.    carvediloL (COREG) 6.25 MG tablet Take 1 tablet (6.25 mg total) by mouth 2 (two) times daily.    clopidogreL (PLAVIX) 75 mg tablet Take 1 tablet (75 mg total) by mouth once daily.    cyanocobalamin (VITAMIN B-12) 1000 MCG tablet Take 1 tablet (1,000 mcg total) by mouth once daily.    diltiaZEM (CARDIZEM CD) 240 MG 24 hr capsule Take 1 capsule (240 mg total) by mouth once daily.    DULoxetine (CYMBALTA) 20 MG capsule Take 1 capsule (20 mg total) by mouth once daily.    fexofenadine (ALLEGRA) 60 MG tablet Take 3 tablets (180 mg total) by mouth every morning.    fish oil-omega-3 fatty acids 300-1,000 mg capsule Take 1 g by mouth once daily.     fluticasone propionate (FLONASE) 50 mcg/actuation nasal spray 1 spray (50 mcg total) by Each Nostril route every morning.    gabapentin (NEURONTIN) 300 MG capsule Take 2 capsules (600 mg total) by mouth 3 (three) times daily.    isosorbide mononitrate (IMDUR) 60 MG 24 hr tablet Take 1 tablet (60 mg total) by mouth once daily.    LORazepam (ATIVAN) 0.5 MG tablet Take 1 tablet (0.5 mg total) by mouth every morning.    losartan (COZAAR) 25 MG tablet Take 1 tablet (25 mg total) by mouth once daily.    metFORMIN (GLUCOPHAGE) 500 MG tablet Take 1 tablet (500 mg total) by mouth 2 (two) times daily.    montelukast (SINGULAIR) 10 mg tablet Take 10 mg by mouth once daily.    mv-mn/folic ac/calcium/vit K1 (WOMEN'S 50 PLUS MULTIVITAMIN ORAL) Take 1 tablet by mouth once daily.    nitroGLYCERIN (NITROSTAT) 0.4 MG SL tablet Place 1 tablet (0.4 mg total) under the tongue every 5 (five) minutes as needed for Chest pain.    oxybutynin (DITROPAN-XL) 5 MG TR24 Take 1 tablet (5 mg total) by mouth every  other day.    pantoprazole (PROTONIX) 40 MG tablet Take 1 tablet (40 mg total) by mouth once daily.    POLYSORBATE 80/GLYCERIN (REFRESH DRY EYE THERAPY OPHT) Apply to eye 2 (two) times daily.    potassium gluconate 550 mg (90 mg) Tab Take 180 mg by mouth 2 (two) times a day.    ranolazine (RANEXA) 500 MG Tb12 Take 1 tablet (500 mg total) by mouth 2 (two) times daily.    SYMBICORT 160-4.5 mcg/actuation HFAA Inhale 1 puff into the lungs 2 (two) times daily.    vitamin D (VITAMIN D3) 1000 units Tab Take 2,000 Units by mouth once daily.    [DISCONTINUED] methocarbamoL (ROBAXIN) 500 MG Tab Take 500 mg by mouth.    [DISCONTINUED] nystatin (MYCOSTATIN) powder Apply topically every 8 (eight) hours.    [DISCONTINUED] ondansetron (ZOFRAN-ODT) 4 MG TbDL Take 1 tablet (4 mg total) by mouth every 8 (eight) hours as needed (nausea).    furosemide (LASIX) 40 MG tablet Take 1 tablet (40 mg total) by mouth 2 (two) times a day.    [DISCONTINUED] levalbuterol (XOPENEX) 1.25 mg/3 mL nebulizer solution SMARTSI Vial(s) Via Nebulizer    [DISCONTINUED] lisinopriL 10 MG tablet Take 10 mg by mouth once daily.     Family History       Problem Relation (Age of Onset)    Heart attack Mother    Heart failure Brother    Stroke Father          Tobacco Use    Smoking status: Never    Smokeless tobacco: Never   Substance and Sexual Activity    Alcohol use: No    Drug use: No    Sexual activity: Not Currently     Review of Systems   Constitutional: Positive for malaise/fatigue. Negative for diaphoresis and fever.   HENT:  Positive for congestion and hoarse voice.    Eyes: Negative.    Cardiovascular:  Positive for dyspnea on exertion and leg swelling. Negative for chest pain, near-syncope, orthopnea, palpitations, paroxysmal nocturnal dyspnea and syncope.   Respiratory:  Positive for cough, shortness of breath and wheezing.    Endocrine: Negative.    Skin: Negative.    Musculoskeletal: Negative.    Gastrointestinal: Negative.    Genitourinary:  Negative.    Neurological: Negative.    Psychiatric/Behavioral: Negative.     Allergic/Immunologic: Negative.    Objective:     Vital Signs (Most Recent):  Temp: 98.9 °F (37.2 °C) (09/30/22 0916)  Pulse: 69 (09/30/22 1208)  Resp: 17 (09/30/22 1208)  BP: 139/63 (09/30/22 1140)  SpO2: 98 % (09/30/22 1208) Vital Signs (24h Range):  Temp:  [98.9 °F (37.2 °C)] 98.9 °F (37.2 °C)  Pulse:  [69-73] 69  Resp:  [17-28] 17  SpO2:  [88 %-98 %] 98 %  BP: (139-192)/(63-84) 139/63        There is no height or weight on file to calculate BMI.    SpO2: 98 %  O2 Device (Oxygen Therapy): nasal cannula    No intake or output data in the 24 hours ending 09/30/22 1315    Lines/Drains/Airways       Peripheral Intravenous Line  Duration                  Peripheral IV - Single Lumen 09/30/22 0958 20 G Left;Lateral;Posterior Forearm <1 day                    Physical Exam  Constitutional:       General: She is not in acute distress.     Appearance: She is obese. She is not diaphoretic.   HENT:      Head: Atraumatic.   Eyes:      General:         Right eye: No discharge.         Left eye: No discharge.   Cardiovascular:      Rate and Rhythm: Normal rate and regular rhythm.      Heart sounds: Murmur heard.   Pulmonary:      Effort: Pulmonary effort is normal.      Breath sounds: Wheezing present. No rales.   Abdominal:      General: Bowel sounds are normal.      Palpations: Abdomen is soft.   Musculoskeletal:      Right lower leg: Edema present.      Left lower leg: Edema present.   Skin:     General: Skin is warm and dry.      Capillary Refill: Capillary refill takes less than 2 seconds.   Neurological:      Mental Status: She is alert and oriented to person, place, and time.       Significant Labs: BMP:   Recent Labs   Lab 09/30/22  0957   *      K 3.6   CL 97   CO2 25   BUN 16   CREATININE 0.7   CALCIUM 9.1   , CMP   Recent Labs   Lab 09/30/22  0957      K 3.6   CL 97   CO2 25   *   BUN 16   CREATININE 0.7   CALCIUM  9.1   PROT 7.2   ALBUMIN 3.6   BILITOT 1.0   ALKPHOS 40*   AST 23   ALT 23   ANIONGAP 15   , CBC   Recent Labs   Lab 09/30/22  0957   WBC 12.48   HGB 12.1   HCT 36.4*      , Lipid Panel No results for input(s): CHOL, HDL, LDLCALC, TRIG, CHOLHDL in the last 48 hours., Troponin   Recent Labs   Lab 09/30/22  0957   TROPONINI 0.017   , and All pertinent lab results from the last 24 hours have been reviewed.    Significant Imaging: Echocardiogram: Transthoracic echo (TTE) complete (Cupid Only):   Results for orders placed or performed during the hospital encounter of 08/11/22   Echo   Result Value Ref Range    BSA 2.26 m2    TDI SEPTAL 0.06 m/s    LV LATERAL E/E' RATIO 19.71 m/s    LV SEPTAL E/E' RATIO 23.00 m/s    LA WIDTH 4.00 cm    AORTIC VALVE CUSP SEPERATION 1.39 cm    TDI LATERAL 0.07 m/s    PV PEAK VELOCITY 0.87 cm/s    LVIDd 4.80 3.5 - 6.0 cm    IVS 0.97 0.6 - 1.1 cm    Posterior Wall 0.90 (A) 0.6 - 1.1 cm    Ao root annulus 2.50 cm    LVIDs 2.00 (A) 2.1 - 4.0 cm    FS 58 28 - 44 %    LA volume 101.56 cm3    LV mass 155.48 g    LA size 5.00 cm    RVDD 2.35 cm    TAPSE 1.50 cm    Left Ventricle Relative Wall Thickness 0.38 cm    AV mean gradient 8 mmHg    AV valve area 2.83 cm2    AV Velocity Ratio 0.63     AV index (prosthetic) 0.68     MV mean gradient 1 mmHg    MV valve area p 1/2 method 5.19 cm2    MV valve area by continuity eq 3.88 cm2    E/A ratio 2.88     Mean e' 0.07 m/s    E wave deceleration time 146.05 msec    IVRT 74.22 msec    LVOT diameter 2.30 cm    LVOT area 4.2 cm2    LVOT peak mazin 1.22 m/s    LVOT peak VTI 27.63 cm    Ao peak mazin 1.95 m/s    Ao VTI 40.49 cm    Mr max mazin 0.04 m/s    LVOT stroke volume 114.74 cm3    AV peak gradient 15 mmHg    MV peak gradient 6 mmHg    E/E' ratio 21.23 m/s    MV Peak E Mazin 1.38 m/s    TR Max Mazin 3.54 m/s    MV VTI 29.59 cm    MV stenosis pressure 1/2 time 42.36 ms    MV Peak A Mazin 0.48 m/s    LV Systolic Volume 73.34 mL    LV Systolic Volume Index 34.1  mL/m2    LV Diastolic Volume 123.09 mL    LV Diastolic Volume Index 57.25 mL/m2    LA Volume Index 47.2 mL/m2    LV Mass Index 72 g/m2    Left Atrium Minor Axis 6.30 cm    Left Atrium Major Axis 5.68 cm    Triscuspid Valve Regurgitation Peak Gradient 50 mmHg    LA Volume Index (Mod) 28.6 mL/m2    LA volume (mod) 61.58 cm3    Right Atrial Pressure (from IVC) 3 mmHg    EF 55 %    Right ventricular length in diastole (apical 4-chamber view) 5.60 cm    TV rest pulmonary artery pressure 53 mmHg    RA Major Axis 5.10 cm    RA Width 2.70 cm    Narrative    · The left ventricle is normal in size with normal systolic function.  · The estimated ejection fraction is 55%.  · Grade III left ventricular diastolic dysfunction.  · Normal right ventricular size with normal right ventricular systolic   function.  · There is abnormal septal wall motion consistent with right ventricular   pacemaker.  · Moderate left atrial enlargement.  · There is a 26 mm S3 transcutaneously-placed aortic bioprosthesis   present. There is no aortic insufficiency present. Prosthetic aortic valve   is normal.  · The aortic valve mean gradient is 8 mmHg with a dimensionless index of   0.68.  · The estimated PA systolic pressure is 53 mmHg.  · Normal central venous pressure (3 mmHg).  · There is pulmonary hypertension.

## 2022-10-01 LAB
ALBUMIN SERPL BCP-MCNC: 3.3 G/DL (ref 3.5–5.2)
ALP SERPL-CCNC: 38 U/L (ref 55–135)
ALT SERPL W/O P-5'-P-CCNC: 22 U/L (ref 10–44)
ANION GAP SERPL CALC-SCNC: 14 MMOL/L (ref 8–16)
AORTIC ROOT ANNULUS: 2.05 CM
AST SERPL-CCNC: 18 U/L (ref 10–40)
AV INDEX (PROSTH): 0.51
AV MEAN GRADIENT: 7 MMHG
AV PEAK GRADIENT: 10 MMHG
AV VALVE AREA: 1.45 CM2
AV VELOCITY RATIO: 0.48
BASOPHILS # BLD AUTO: 0.02 K/UL (ref 0–0.2)
BASOPHILS NFR BLD: 0.2 % (ref 0–1.9)
BILIRUB SERPL-MCNC: 0.6 MG/DL (ref 0.1–1)
BUN SERPL-MCNC: 17 MG/DL (ref 8–23)
CALCIUM SERPL-MCNC: 9 MG/DL (ref 8.7–10.5)
CHLORIDE SERPL-SCNC: 95 MMOL/L (ref 95–110)
CO2 SERPL-SCNC: 30 MMOL/L (ref 23–29)
CREAT SERPL-MCNC: 0.7 MG/DL (ref 0.5–1.4)
CV ECHO LV RWT: 0.49 CM
DIFFERENTIAL METHOD: ABNORMAL
DOP CALC AO PEAK VEL: 1.55 M/S
DOP CALC AO VTI: 39.4 CM
DOP CALC LVOT AREA: 2.9 CM2
DOP CALC LVOT DIAMETER: 1.91 CM
DOP CALC LVOT PEAK VEL: 0.75 M/S
DOP CALC LVOT STROKE VOLUME: 56.99 CM3
DOP CALC MV VTI: 38.2 CM
DOP CALCLVOT PEAK VEL VTI: 19.9 CM
E WAVE DECELERATION TIME: 174.51 MSEC
E/A RATIO: 4.03
E/E' RATIO: 12.67 M/S
ECHO LV POSTERIOR WALL: 1.25 CM (ref 0.6–1.1)
EJECTION FRACTION: 55 %
EOSINOPHIL # BLD AUTO: 0 K/UL (ref 0–0.5)
EOSINOPHIL NFR BLD: 0 % (ref 0–8)
ERYTHROCYTE [DISTWIDTH] IN BLOOD BY AUTOMATED COUNT: 14.1 % (ref 11.5–14.5)
EST. GFR  (NO RACE VARIABLE): >60 ML/MIN/1.73 M^2
FRACTIONAL SHORTENING: 29 % (ref 28–44)
GLUCOSE SERPL-MCNC: 133 MG/DL (ref 70–110)
HCT VFR BLD AUTO: 37.8 % (ref 37–48.5)
HGB BLD-MCNC: 12.5 G/DL (ref 12–16)
IMM GRANULOCYTES # BLD AUTO: 0.05 K/UL (ref 0–0.04)
IMM GRANULOCYTES NFR BLD AUTO: 0.4 % (ref 0–0.5)
INFLUENZA A, MOLECULAR: NEGATIVE
INFLUENZA B, MOLECULAR: NEGATIVE
INTERVENTRICULAR SEPTUM: 1.94 CM (ref 0.6–1.1)
IRON SERPL-MCNC: 39 UG/DL (ref 30–160)
IVC DIAMETER: 2.24 CM
LA MAJOR: 6.47 CM
LA MINOR: 5.94 CM
LEFT ATRIUM SIZE: 4.54 CM
LEFT ATRIUM VOLUME INDEX MOD: 45.2 ML/M2
LEFT ATRIUM VOLUME MOD: 94.85 CM3
LEFT INTERNAL DIMENSION IN SYSTOLE: 3.61 CM (ref 2.1–4)
LEFT VENTRICLE DIASTOLIC VOLUME INDEX: 57.89 ML/M2
LEFT VENTRICLE DIASTOLIC VOLUME: 121.57 ML
LEFT VENTRICLE MASS INDEX: 171 G/M2
LEFT VENTRICLE SYSTOLIC VOLUME INDEX: 26.1 ML/M2
LEFT VENTRICLE SYSTOLIC VOLUME: 54.91 ML
LEFT VENTRICULAR INTERNAL DIMENSION IN DIASTOLE: 5.06 CM (ref 3.5–6)
LEFT VENTRICULAR MASS: 359.99 G
LV LATERAL E/E' RATIO: 9.5 M/S
LV SEPTAL E/E' RATIO: 19 M/S
LVOT MG: 1.43 MMHG
LVOT MV: 0.57 CM/S
LYMPHOCYTES # BLD AUTO: 1.4 K/UL (ref 1–4.8)
LYMPHOCYTES NFR BLD: 12.3 % (ref 18–48)
MAGNESIUM SERPL-MCNC: 1.6 MG/DL (ref 1.6–2.6)
MCH RBC QN AUTO: 31.3 PG (ref 27–31)
MCHC RBC AUTO-ENTMCNC: 33.1 G/DL (ref 32–36)
MCV RBC AUTO: 95 FL (ref 82–98)
MONOCYTES # BLD AUTO: 0.9 K/UL (ref 0.3–1)
MONOCYTES NFR BLD: 8.1 % (ref 4–15)
MV MEAN GRADIENT: 4 MMHG
MV PEAK A VEL: 0.33 M/S
MV PEAK E VEL: 1.33 M/S
MV PEAK GRADIENT: 8 MMHG
MV STENOSIS PRESSURE HALF TIME: 50.61 MS
MV VALVE AREA BY CONTINUITY EQUATION: 1.49 CM2
MV VALVE AREA P 1/2 METHOD: 4.35 CM2
NEUTROPHILS # BLD AUTO: 9 K/UL (ref 1.8–7.7)
NEUTROPHILS NFR BLD: 79 % (ref 38–73)
NRBC BLD-RTO: 0 /100 WBC
PHOSPHATE SERPL-MCNC: 3.1 MG/DL (ref 2.7–4.5)
PISA MRMAX VEL: 4.24 M/S
PISA TR MAX VEL: 3.13 M/S
PLATELET # BLD AUTO: 234 K/UL (ref 150–450)
PMV BLD AUTO: 10.7 FL (ref 9.2–12.9)
POCT GLUCOSE: 132 MG/DL (ref 70–110)
POCT GLUCOSE: 142 MG/DL (ref 70–110)
POCT GLUCOSE: 193 MG/DL (ref 70–110)
POCT GLUCOSE: 199 MG/DL (ref 70–110)
POTASSIUM SERPL-SCNC: 3.6 MMOL/L (ref 3.5–5.1)
PROT SERPL-MCNC: 7.2 G/DL (ref 6–8.4)
PULM VEIN S/D RATIO: 2.09
PV MV: 0.65 M/S
PV PEAK D VEL: 0.34 M/S
PV PEAK S VEL: 0.71 M/S
PV PEAK VELOCITY: 0.8 CM/S
RA MAJOR: 5.57 CM
RA PRESSURE: 8 MMHG
RBC # BLD AUTO: 4 M/UL (ref 4–5.4)
RIGHT VENTRICULAR END-DIASTOLIC DIMENSION: 2.37 CM
RSV AG SPEC QL IA: NEGATIVE
RV TISSUE DOPPLER FREE WALL SYSTOLIC VELOCITY 1 (APICAL 4 CHAMBER VIEW): 0.01 CM/S
SATURATED IRON: 9 % (ref 20–50)
SODIUM SERPL-SCNC: 139 MMOL/L (ref 136–145)
SPECIMEN SOURCE: NORMAL
SPECIMEN SOURCE: NORMAL
TDI LATERAL: 0.14 M/S
TDI SEPTAL: 0.07 M/S
TDI: 0.11 M/S
TOTAL IRON BINDING CAPACITY: 429 UG/DL (ref 250–450)
TR MAX PG: 39 MMHG
TRANSFERRIN SERPL-MCNC: 290 MG/DL (ref 200–375)
TV REST PULMONARY ARTERY PRESSURE: 47 MMHG
WBC # BLD AUTO: 11.4 K/UL (ref 3.9–12.7)

## 2022-10-01 PROCEDURE — 94640 AIRWAY INHALATION TREATMENT: CPT

## 2022-10-01 PROCEDURE — 25000242 PHARM REV CODE 250 ALT 637 W/ HCPCS

## 2022-10-01 PROCEDURE — 63600175 PHARM REV CODE 636 W HCPCS: Performed by: STUDENT IN AN ORGANIZED HEALTH CARE EDUCATION/TRAINING PROGRAM

## 2022-10-01 PROCEDURE — 84100 ASSAY OF PHOSPHORUS: CPT

## 2022-10-01 PROCEDURE — 99900035 HC TECH TIME PER 15 MIN (STAT)

## 2022-10-01 PROCEDURE — 94667 MNPJ CHEST WALL 1ST: CPT

## 2022-10-01 PROCEDURE — 36415 COLL VENOUS BLD VENIPUNCTURE: CPT

## 2022-10-01 PROCEDURE — 94668 MNPJ CHEST WALL SBSQ: CPT

## 2022-10-01 PROCEDURE — 11000001 HC ACUTE MED/SURG PRIVATE ROOM

## 2022-10-01 PROCEDURE — 27000221 HC OXYGEN, UP TO 24 HOURS

## 2022-10-01 PROCEDURE — 97535 SELF CARE MNGMENT TRAINING: CPT

## 2022-10-01 PROCEDURE — 25000242 PHARM REV CODE 250 ALT 637 W/ HCPCS: Performed by: STUDENT IN AN ORGANIZED HEALTH CARE EDUCATION/TRAINING PROGRAM

## 2022-10-01 PROCEDURE — 87502 INFLUENZA DNA AMP PROBE: CPT | Performed by: STUDENT IN AN ORGANIZED HEALTH CARE EDUCATION/TRAINING PROGRAM

## 2022-10-01 PROCEDURE — 87634 RSV DNA/RNA AMP PROBE: CPT | Performed by: STUDENT IN AN ORGANIZED HEALTH CARE EDUCATION/TRAINING PROGRAM

## 2022-10-01 PROCEDURE — 94761 N-INVAS EAR/PLS OXIMETRY MLT: CPT

## 2022-10-01 PROCEDURE — 83735 ASSAY OF MAGNESIUM: CPT

## 2022-10-01 PROCEDURE — 85025 COMPLETE CBC W/AUTO DIFF WBC: CPT

## 2022-10-01 PROCEDURE — 92610 EVALUATE SWALLOWING FUNCTION: CPT

## 2022-10-01 PROCEDURE — 99233 SBSQ HOSP IP/OBS HIGH 50: CPT | Mod: 25,,, | Performed by: INTERNAL MEDICINE

## 2022-10-01 PROCEDURE — 25000003 PHARM REV CODE 250: Performed by: STUDENT IN AN ORGANIZED HEALTH CARE EDUCATION/TRAINING PROGRAM

## 2022-10-01 PROCEDURE — 25000003 PHARM REV CODE 250

## 2022-10-01 PROCEDURE — 80053 COMPREHEN METABOLIC PANEL: CPT

## 2022-10-01 PROCEDURE — 99233 PR SUBSEQUENT HOSPITAL CARE,LEVL III: ICD-10-PCS | Mod: 25,,, | Performed by: INTERNAL MEDICINE

## 2022-10-01 RX ORDER — IPRATROPIUM BROMIDE AND ALBUTEROL SULFATE 2.5; .5 MG/3ML; MG/3ML
3 SOLUTION RESPIRATORY (INHALATION) EVERY 6 HOURS
Status: DISCONTINUED | OUTPATIENT
Start: 2022-10-01 | End: 2022-10-02 | Stop reason: HOSPADM

## 2022-10-01 RX ORDER — FUROSEMIDE 40 MG/1
40 TABLET ORAL 2 TIMES DAILY
Status: DISCONTINUED | OUTPATIENT
Start: 2022-10-01 | End: 2022-10-02 | Stop reason: HOSPADM

## 2022-10-01 RX ORDER — METHYLPREDNISOLONE SOD SUCC 125 MG
125 VIAL (EA) INJECTION
Status: COMPLETED | OUTPATIENT
Start: 2022-10-01 | End: 2022-10-02

## 2022-10-01 RX ADMIN — DILTIAZEM HYDROCHLORIDE 240 MG: 120 CAPSULE, COATED, EXTENDED RELEASE ORAL at 08:10

## 2022-10-01 RX ADMIN — DOXYCYCLINE HYCLATE 100 MG: 100 TABLET, COATED ORAL at 08:10

## 2022-10-01 RX ADMIN — APIXABAN 5 MG: 5 TABLET, FILM COATED ORAL at 08:10

## 2022-10-01 RX ADMIN — LOSARTAN POTASSIUM 25 MG: 25 TABLET, FILM COATED ORAL at 08:10

## 2022-10-01 RX ADMIN — FLUTICASONE FUROATE AND VILANTEROL TRIFENATATE 1 PUFF: 100; 25 POWDER RESPIRATORY (INHALATION) at 07:10

## 2022-10-01 RX ADMIN — ATORVASTATIN CALCIUM 10 MG: 10 TABLET, FILM COATED ORAL at 08:10

## 2022-10-01 RX ADMIN — GUAIFENESIN 400 MG: 100 SOLUTION ORAL at 02:10

## 2022-10-01 RX ADMIN — IPRATROPIUM BROMIDE AND ALBUTEROL SULFATE 3 ML: .5; 2.5 SOLUTION RESPIRATORY (INHALATION) at 07:10

## 2022-10-01 RX ADMIN — CLOPIDOGREL 75 MG: 75 TABLET, FILM COATED ORAL at 08:10

## 2022-10-01 RX ADMIN — POLYETHYLENE GLYCOL 3350 17 G: 17 POWDER, FOR SOLUTION ORAL at 08:10

## 2022-10-01 RX ADMIN — METHYLPREDNISOLONE SODIUM SUCCINATE 125 MG: 125 INJECTION, POWDER, FOR SOLUTION INTRAMUSCULAR; INTRAVENOUS at 01:10

## 2022-10-01 RX ADMIN — GABAPENTIN 300 MG: 300 CAPSULE ORAL at 02:10

## 2022-10-01 RX ADMIN — DOCUSATE SODIUM AND SENNOSIDES 1 TABLET: 8.6; 5 TABLET, FILM COATED ORAL at 08:10

## 2022-10-01 RX ADMIN — GABAPENTIN 300 MG: 300 CAPSULE ORAL at 08:10

## 2022-10-01 RX ADMIN — RANOLAZINE 500 MG: 500 TABLET, FILM COATED, EXTENDED RELEASE ORAL at 08:10

## 2022-10-01 RX ADMIN — OXYBUTYNIN CHLORIDE 5 MG: 5 TABLET, EXTENDED RELEASE ORAL at 08:10

## 2022-10-01 RX ADMIN — MONTELUKAST 10 MG: 10 TABLET, FILM COATED ORAL at 08:10

## 2022-10-01 RX ADMIN — PREDNISONE 40 MG: 20 TABLET ORAL at 08:10

## 2022-10-01 RX ADMIN — FUROSEMIDE 40 MG: 40 TABLET ORAL at 08:10

## 2022-10-01 RX ADMIN — POTASSIUM BICARBONATE 10 MEQ: 391 TABLET, EFFERVESCENT ORAL at 08:10

## 2022-10-01 RX ADMIN — IPRATROPIUM BROMIDE AND ALBUTEROL SULFATE 3 ML: .5; 2.5 SOLUTION RESPIRATORY (INHALATION) at 06:10

## 2022-10-01 RX ADMIN — GUAIFENESIN 400 MG: 100 SOLUTION ORAL at 09:10

## 2022-10-01 RX ADMIN — IPRATROPIUM BROMIDE AND ALBUTEROL SULFATE 3 ML: .5; 2.5 SOLUTION RESPIRATORY (INHALATION) at 11:10

## 2022-10-01 RX ADMIN — ACETAMINOPHEN 650 MG: 325 TABLET ORAL at 02:10

## 2022-10-01 RX ADMIN — FUROSEMIDE 40 MG: 40 TABLET ORAL at 09:10

## 2022-10-01 RX ADMIN — GUAIFENESIN 400 MG: 100 SOLUTION ORAL at 04:10

## 2022-10-01 RX ADMIN — FLUTICASONE PROPIONATE 50 MCG: 50 SPRAY, METERED NASAL at 06:10

## 2022-10-01 RX ADMIN — CARVEDILOL 6.25 MG: 6.25 TABLET, FILM COATED ORAL at 08:10

## 2022-10-01 RX ADMIN — PANTOPRAZOLE SODIUM 40 MG: 40 TABLET, DELAYED RELEASE ORAL at 08:10

## 2022-10-01 RX ADMIN — FUROSEMIDE 80 MG: 10 INJECTION, SOLUTION INTRAMUSCULAR; INTRAVENOUS at 06:10

## 2022-10-01 RX ADMIN — ISOSORBIDE MONONITRATE 60 MG: 30 TABLET, EXTENDED RELEASE ORAL at 08:10

## 2022-10-01 RX ADMIN — DULOXETINE 20 MG: 20 CAPSULE, DELAYED RELEASE ORAL at 08:10

## 2022-10-01 NOTE — PROGRESS NOTES
"[facility] Medicine Progress Note    Primary Team: [facility] Hospitalist Team B  Attending Physician: Jarrod Corrales MD  Resident: Krzysztof Price MD  Intern: MD Beto    Subjective:      Pt reports continued nonproductive cough and shortness of breath. Overall state she feels about 50% better than she did yesterday but is still not back to her baseline. Patient denies fever, chills, and chest pain this morning.      Objective:     Last 24 Hour Vital Signs:  BP  Min: 133/76  Max: 192/77  Temp  Av.6 °F (36.4 °C)  Min: 96.1 °F (35.6 °C)  Max: 98.9 °F (37.2 °C)  Pulse  Av.8  Min: 69  Max: 74  Resp  Av.5  Min: 17  Max: 30  SpO2  Av %  Min: 88 %  Max: 99 %  Height  Av' 4" (162.6 cm)  Min: 5' 4" (162.6 cm)  Max: 5' 4" (162.6 cm)  Weight  Av.6 kg (235 lb 0.2 oz)  Min: 106.5 kg (234 lb 12.6 oz)  Max: 106.7 kg (235 lb 3.7 oz)  I/O last 3 completed shifts:  In: 480 [P.O.:480]  Out: 2550 [Urine:2550]    Physical Examination:  Physical Exam  Constitutional:       General: She is not in acute distress.     Appearance: Normal appearance. She is obese. She is not toxic-appearing.   HENT:      Head: Normocephalic and atraumatic.      Nose: Nose normal.      Mouth/Throat:      Mouth: Mucous membranes are moist.      Pharynx: Oropharynx is clear.   Eyes:      General: No scleral icterus.     Conjunctiva/sclera: Conjunctivae normal.   Cardiovascular:      Rate and Rhythm: Normal rate and regular rhythm.      Pulses: Normal pulses.      Heart sounds: Normal heart sounds. No murmur heard.  Pulmonary:      Effort: Pulmonary effort is normal.      Breath sounds: No stridor. Wheezing and rhonchi present.      Comments: No dullness to percussion b/l  Musculoskeletal:      Cervical back: Normal range of motion.      Right lower leg: No edema.      Left lower leg: No edema.      Comments: No LE or sacral edema noted   Skin:     General: Skin is warm and dry.      Capillary Refill: Capillary refill takes less than 2 " seconds.   Neurological:      General: No focal deficit present.      Mental Status: She is alert and oriented to person, place, and time.   Psychiatric:         Mood and Affect: Mood normal.        Laboratory:  Laboratory Data Reviewed: yes  Pertinent Findings:  Bicarb elevated to 30 today    Recent Labs   Lab 09/30/22  0957 10/01/22  0329   WBC 12.48 11.40   HGB 12.1 12.5   HCT 36.4* 37.8    234   MCV 95 95   RDW 14.1 14.1    139   K 3.6 3.6   CL 97 95   CO2 25 30*   BUN 16 17   CREATININE 0.7 0.7   * 133*   PROT 7.2 7.2   ALBUMIN 3.6 3.3*   BILITOT 1.0 0.6   AST 23 18   ALKPHOS 40* 38*   ALT 23 22       Other Results:  EKG (my interpretation): None new  Radiology Data Reviewed: yes  Pertinent Findings:     Imaging Results              CT Chest Without Contrast (Final result)  Result time 09/30/22 15:42:58      Final result by Cherise Vasquez MD (09/30/22 15:42:58)                   Impression:      1. Ill-defined airspace nodules seen throughout both lungs, most concerning for infection.  The differential includes aspiration and alveolar edema.  2. Findings concerning for tracheobronchomalacia.  3. Severe left-sided coronary artery calcifications.  4. Severe abdominal aortic calcifications, including severe calcification of the celiac origin.      Electronically signed by: Cherise Vasquez  Date:    09/30/2022  Time:    15:42               Narrative:    EXAMINATION:  CT CHEST WITHOUT CONTRAST    CLINICAL HISTORY:  Dyspnea, chronic, unclear etiology;    TECHNIQUE:  Low dose axial images, sagittal and coronal reformations were obtained from the thoracic inlet to the lung bases. Contrast was not administered.    COMPARISON:  Chest radiograph performed earlier today    FINDINGS:  Support tubes and lines: None    Aorta: Patient is status post aortic valve replacement. The aorta is normal caliber    Heart: Mildly enlarged.    Coronary arteries: Severe left-sided coronary artery  "calcifications    Pericardium: Normal. No effusion, thickening, or calcification.    Central pulmonary arteries: Normal caliber.    Base of neck/thyroid: Unremarkable.    Lymph nodes: No supraclavicular, axillary, internal mammary, mediastinal, or hilar adenopathy.    Esophagus: Unremarkable.    Pleura: No effusion, thickening, or calcification.    Upper abdomen: The abdominal aorta is heavily calcified.  The celiac origin is heavily calcified.    Body wall: Unremarkable.    Airways: Collapsed likely as the patient is in expiration.    Lungs: Multiple ill-defined airspace nodules are seen in the right upper lobe, right middle lobe and both lower lobes.  Increased attenuation seen in both lungs is likely secondary to the patient being in expiration.    Bones: Unremarkable.                                       X-Ray Chest AP Portable (Final result)  Result time 09/30/22 10:47:05      Final result by Edilberto Cook MD (09/30/22 10:47:05)                   Impression:      No detrimental change when compared with 08/11/2022.      Electronically signed by: Edilberto Cook MD  Date:    09/30/2022  Time:    10:47               Narrative:    EXAMINATION:  XR CHEST AP PORTABLE    CLINICAL HISTORY:  Provided history is "Chest Pain;  ".    TECHNIQUE:  One view of the chest.    COMPARISON:  08/11/2022.    FINDINGS:  Cardiac wires overlie the chest.  Left chest wall cardiac pacing device is present with transvenous leads overlying the heart.  Right hemidiaphragm is elevated.  Postoperative changes of TAVR.  Cardiac silhouette is enlarged and similar to the prior study.  Atherosclerotic calcifications overlie the aortic arch.  Lung volumes are relatively low, accentuating basilar markings.  No confluent area of consolidation identified.  No large pleural effusion.  No pneumothorax.  No detrimental change when compared with the prior study.                                       Current Medications:     Infusions:       " Scheduled:   albuterol-ipratropium  3 mL Nebulization QID WAKE    apixaban  5 mg Oral BID    atorvastatin  10 mg Oral Daily    carvediloL  6.25 mg Oral BID    clopidogreL  75 mg Oral Daily    diltiaZEM  240 mg Oral Daily    doxycycline  100 mg Oral Q12H    DULoxetine  20 mg Oral Daily    fluticasone furoate-vilanteroL  1 puff Inhalation Daily    fluticasone propionate  1 spray Each Nostril QAM    furosemide (LASIX) injection  80 mg Intravenous BID AC    gabapentin  300 mg Oral TID    isosorbide mononitrate  60 mg Oral Daily    losartan  25 mg Oral Daily    montelukast  10 mg Oral Daily    oxybutynin  5 mg Oral Every other day    pantoprazole  40 mg Oral Daily    polyethylene glycol  17 g Oral Daily    potassium bicarbonate  10 mEq Oral Daily    predniSONE  40 mg Oral Daily    ranolazine  500 mg Oral BID    senna-docusate 8.6-50 mg  1 tablet Oral BID        PRN:  acetaminophen, benzonatate, dextrose 10%, glucose, guaiFENesin 100 mg/5 ml, insulin aspart U-100, naloxone, sodium chloride 0.9%    Antibiotics and Day Number of Therapy:  Doxycycline 100mg q12h Day #2      Assessment:     Adriana Strong is a 77 y.o.female with CHF, Afib, pacemaker, Hx of porcine aortic valve replacement, COPD, asthma, and T2DM with shortness of breath a/w nonproductive cough and chest pain/tightness consistent with mixed picture of CHF and asthma exacerbation. Improved slightly today, however still with significant symptoms and wheezing/rhonchi following diuresis.      Plan:     Acute exacerbation of asthma (adult-onset) vs. COPD  - currently on duo-neb, singulair, symbicort, and albuterol prn  - given methylprednisolone, duo-neb in ED; continued on po prednisone inpatient + duonebs  - doxycycline for CAP coverage (keflex allergy)  - pt wheezing >> crackles on exam (R>L), possible mixed picture of fluid overload and asthma vs. COPD exacerbation  - pt reports that her asthma is adult-onset and has had previous hospitalizations for  exacerbations in the past that required ICU level care  - pt presently on 2L NC with no previous home supplemental O2 support  - Recommend repeat OP PFTs to further delineate respiratory disease process      Acute on chronic HFpEF (LVEF 55%) with hypoxia  Chronic Afib (on eliquis)  S/p Pacemaker  Aortic stenosis s/p Porcine TAVR (on plavix)  - , negative troponin, slightly elevated PCO2 on VBG  - given 80mg IV lasix, x3, with 2.5 L out over 24 hours. No appreciable edema on exam this morning. Bicarb up to 30; therefore transitioned back to home dose lasix 40 BID po. Strict Ins and outs + daily weights  - O2 sat improved from 88% to 97% on 2L NC with improvement of SOB/tachypnea  - unlikely to be angina despite chest tightness, negative troponin and negative stress test on 9/14  - TTE repeated, pending read  - [facility] consulted (Cardiologist: [place])     Leukocytosis (WBC 12.4k)  Elevated Lactate (2.4)  Mild bandemia (.08)  - pt with mild leukocytosis and bandemia and elevated lactate  - pt afebrile with no productive cough, subjective fever/chills, or recent sick contacts as pt primarily stays at home   - lactate can be possibly associated with acute respiratory distress  - CT Chest shows ill-defined airspace nodules seen throughout both lungs, most concerning for infection but possibly due to aspiration or alveolar edema  - questionable infectious process involvement, covering with doxy monotherapy (secondary to pt's cephalexin, macrolide, and fluoroquinolone allergies). Pro-cheikh negative.  - will be difficult to accurately trend values given steroid administration, but will continue to monitor for any infectious symptoms  - Pt reports issues with intermittent aspiration, consulted SLP recommending regular diet with nectar thin liquids      Hx Lacunar CVA  HLD  - history of TIAs  - currently on plavix, eliquis, and atorvastatin, continuing while inpatient     T2DM  Diabetic Polyneuropathy  -  currently on metformin, gabapentin  - ordered A1c  - low dose SSI with POCT glucose ACHS  - continue gabapentin at reduced dose while inpatient      HTN  - continue home meds carvedilol, losartan, lasix     GERD  - currently on caltrate, protonix  - continue protonix while inpatient     Chronic Mesenteric Ischemia  Fatty Liver Disease  - no inpatient management needs      Diet: Diabetic, cardiac diet  DVT ppx: on eliquis  Code: FULL  Dispo: pending continued improvement, continue abx, prednisone for asthma exacerbation     Krzysztof Price MD  [facility] -III    [facility] Hospitalist Pager numbers:   [facility] Hospitalist Medicine Team A (Rajinder/Isis): [phone]  [facility] Hospitalist Medicine Team B (Antoni/Carleen):  [phone]

## 2022-10-01 NOTE — PROGRESS NOTES
[facility] - Telemetry  Cardiology  Progress Note    Patient Name: Adriana Strong  MRN: [id number]  Admission Date: 9/30/2022  Hospital Length of Stay: 1 days  Code Status: Full Code   Attending Physician: Jarrod Corrales MD   Primary Care Physician: Krzysztof Mcgraw MD  Expected Discharge Date:   Principal Problem:Acute on chronic heart failure with preserved ejection fraction    Subjective:     Hospital Course:   10/1/2022 Seen ad examined breathing is a little better. Diuresed well. Still with significant wheezes.       Review of Systems   Constitutional: Negative for chills and fever.   Cardiovascular:  Positive for orthopnea. Negative for chest pain and leg swelling.   Respiratory:  Positive for cough, shortness of breath and wheezing.    Hematologic/Lymphatic: Negative for bleeding problem.   Musculoskeletal:  Positive for arthritis.   Gastrointestinal:  Negative for abdominal pain.   Neurological: Negative.    Objective:     Vital Signs (Most Recent):  Temp: 97.1 °F (36.2 °C) (10/01/22 0714)  Pulse: 69 (10/01/22 0741)  Resp: 17 (10/01/22 0741)  BP: (!) 150/81 (10/01/22 0908)  SpO2: 98 % (10/01/22 0741)   Vital Signs (24h Range):  Temp:  [96.1 °F (35.6 °C)-98.7 °F (37.1 °C)] 97.1 °F (36.2 °C)  Pulse:  [69-74] 69  Resp:  [17-30] 17  SpO2:  [95 %-99 %] 98 %  BP: (133-175)/(61-81) 150/81     Weight: 106.7 kg (235 lb 3.7 oz)  Body mass index is 40.38 kg/m².    SpO2: 98 %  O2 Device (Oxygen Therapy): nasal cannula      Intake/Output Summary (Last 24 hours) at 10/1/2022 1137  Last data filed at 10/1/2022 0851  Gross per 24 hour   Intake 680 ml   Output 3050 ml   Net -2370 ml       Lines/Drains/Airways       Drain  Duration             Female External Urinary Catheter 09/30/22 1900 <1 day              Peripheral Intravenous Line  Duration                  Peripheral IV - Single Lumen 09/30/22 0958 20 G Left;Lateral;Posterior Forearm 1 day                    Physical Exam  Constitutional:       Appearance: She is obese. She  is ill-appearing.   HENT:      Head: Normocephalic and atraumatic.   Cardiovascular:      Rate and Rhythm: Normal rate and regular rhythm.      Heart sounds: Murmur (grade II systolic) heard.   Pulmonary:      Breath sounds: Wheezing and rhonchi present.   Abdominal:      General: Abdomen is flat.      Palpations: Abdomen is soft.   Musculoskeletal:      Right lower leg: No edema.      Left lower leg: No edema.   Neurological:      General: No focal deficit present.      Mental Status: She is alert and oriented to person, place, and time.   Psychiatric:         Mood and Affect: Mood normal.         Behavior: Behavior normal.       Significant Labs: BMP:   Recent Labs   Lab 09/30/22  0957 10/01/22  0329   * 133*    139   K 3.6 3.6   CL 97 95   CO2 25 30*   BUN 16 17   CREATININE 0.7 0.7   CALCIUM 9.1 9.0   MG  --  1.6   , CMP   Recent Labs   Lab 09/30/22  0957 10/01/22  0329    139   K 3.6 3.6   CL 97 95   CO2 25 30*   * 133*   BUN 16 17   CREATININE 0.7 0.7   CALCIUM 9.1 9.0   PROT 7.2 7.2   ALBUMIN 3.6 3.3*   BILITOT 1.0 0.6   ALKPHOS 40* 38*   AST 23 18   ALT 23 22   ANIONGAP 15 14   , CBC   Recent Labs   Lab 09/30/22  0957 10/01/22  0329   WBC 12.48 11.40   HGB 12.1 12.5   HCT 36.4* 37.8    234   , INR   Recent Labs   Lab 09/30/22  1316   INR 1.2   , Lipid Panel No results for input(s): CHOL, HDL, LDLCALC, TRIG, CHOLHDL in the last 48 hours., Troponin   Recent Labs   Lab 09/30/22  0957 09/30/22  1316   TROPONINI 0.017 0.018   , and All pertinent lab results from the last 24 hours have been reviewed.    Significant Imaging: Echocardiogram: 2D echo with color flow doppler:   Results for orders placed or performed during the hospital encounter of 04/30/18   Echo doppler color flow   Result Value Ref Range    EF + QEF 65 55 - 65    Aortic Valve Stenosis MODERATE (A)     Est. PA Systolic Pressure 31.3     Mitral Valve Mobility NORMAL     Tricuspid Valve Regurgitation TRIVIAL TO MILD      Narrative    Date of Procedure: 04/30/2018        TEST DESCRIPTION   Technical Quality: This is a technically adequate study.     General: The patient was in an irregularly irregular rhythm throughout the study.     Aorta: The aortic root is normal in size, measuring 2.8 cm at sinotubular junction.     Left Atrium: The left atrium is normal in size, measuring 4.7 cm across in the parasternal view, and 5.7 cm across in the apical view.     Left Ventricle: The left ventricle is normal in size, with an end-diastolic diameter of 3.9 cm, and an end-systolic diameter of 2.9 cm. LV wall thickness is normal, with the septum measuring 1.1 cm and the posterior wall measuring 1.0 cm across. Relative   wall thickness was increased at 0.51, and the LV mass index was 74.7 g/m2 consistent with concentric remodeling. There are no regional wall motion abnormalities. Left ventricular systolic function appears normal. Visually estimated ejection fraction is   60-65%. The LV Doppler derived stroke volume equals 119.0 ccs.     Diastolic indices: Diastolic function is indeterminate.     Right Atrium: The right atrium is normal in size, measuring 4.7 cm in length in the apical view.     Right Ventricle: The right ventricle is normal in size. Global right ventricular systolic function appears normal. The estimated PA systolic pressure is 31 mmHg.     Aortic Valve:  The aortic valve is moderately sclerotic. The aortic valve is tri-leaflet in structure. The peak velocity obtained across the aortic valve is 3.22 m/s, which translates to a peak gradient of 41 mmHg. The mean gradient is 29 mmHg. Using a   left ventricular outflow tract diameter of 2.0 cm, a left ventricular outflow tract velocity time integral of 38 cm, and a peak instantaneous transvalvular velocity time integral of 80 cm, the calculated aortic valve area is 1.49 cm2(AVAi is 0.75   cm2/m2), consistent with moderate aortic stenosis.     Mitral Valve:  The mitral valve is  mildly sclerotic with normal leaflet mobility.     Tricuspid Valve:  The tricuspid valve is mildly sclerotic with normal leaflet mobility. There is trivial to mild tricuspid regurgitation.     Pulmonary Valve:  The pulmonic valve is not well seen.     IVC: IVC is normal in size and collapses > 50% with a sniff, suggesting normal right atrial pressure of 3 mmHg.     Atrial Septum: The atrial septum is intact.     Intracavitary: There is no evidence of pericardial effusion, intracavity mass, thrombi, or vegetation.         CONCLUSIONS     1 - Normal left ventricular systolic function (EF 60-65%).     2 - Indeterminate LV diastolic function.     3 - Moderate aortic stenosis, MISTY = 1.49 cm2, AVAi = 0.75 cm2/m2, peak velocity = 3.22 m/s, mean gradient = 29 mmHg.     4 - The estimated PA systolic pressure is 31 mmHg.             This document has been electronically    SIGNED BY: Steven Suresh MD On: 05/01/2018 08:10    and Transthoracic echo (TTE) complete (Cupid Only):   Results for orders placed or performed during the hospital encounter of 09/30/22   Echo   Result Value Ref Range    TDI SEPTAL 0.07 m/s    LV LATERAL E/E' RATIO 9.50 m/s    LV SEPTAL E/E' RATIO 19.00 m/s    IVC diameter 2.24 cm    Left Ventricular Outflow Tract Mean Velocity 0.57 cm/s    Left Ventricular Outflow Tract Mean Gradient 1.43 mmHg    Pulmonary Valve Mean Velocity 0.65 m/s    TDI LATERAL 0.14 m/s    PV PEAK VELOCITY 0.80 cm/s    LVIDd 5.06 3.5 - 6.0 cm    IVS 1.94 (A) 0.6 - 1.1 cm    Posterior Wall 1.25 (A) 0.6 - 1.1 cm    Ao root annulus 2.05 cm    LVIDs 3.61 2.1 - 4.0 cm    FS 29 28 - 44 %    LV mass 359.99 g    LA size 4.54 cm    RVDD 2.37 cm    RV S' 0.01 cm/s    Left Ventricle Relative Wall Thickness 0.49 cm    AV mean gradient 7 mmHg    AV valve area 1.45 cm2    AV Velocity Ratio 0.48     AV index (prosthetic) 0.51     MV mean gradient 4 mmHg    MV valve area p 1/2 method 4.35 cm2    MV valve area by continuity eq 1.49 cm2    E/A ratio  4.03     Mean e' 0.11 m/s    E wave deceleration time 174.51 msec    Pulm vein S/D ratio 2.09     LVOT diameter 1.91 cm    LVOT area 2.9 cm2    LVOT peak mazin 0.75 m/s    LVOT peak VTI 19.90 cm    Ao peak mazin 1.55 m/s    Ao VTI 39.4 cm    Mr max mazin 4.24 m/s    LVOT stroke volume 56.99 cm3    AV peak gradient 10 mmHg    MV peak gradient 8 mmHg    E/E' ratio 12.67 m/s    MV Peak E Mazin 1.33 m/s    TR Max Mazin 3.13 m/s    MV VTI 38.2 cm    MV stenosis pressure 1/2 time 50.61 ms    MV Peak A Mazin 0.33 m/s    PV Peak S Mazin 0.71 m/s    PV Peak D Mazin 0.34 m/s    LV Systolic Volume 54.91 mL    LV Systolic Volume Index 26.1 mL/m2    LV Diastolic Volume 121.57 mL    LV Diastolic Volume Index 57.89 mL/m2    LV Mass Index 171 g/m2    RA Major Axis 5.57 cm    Left Atrium Minor Axis 5.94 cm    Left Atrium Major Axis 6.47 cm    Triscuspid Valve Regurgitation Peak Gradient 39 mmHg    LA Volume Index (Mod) 45.2 mL/m2    LA volume (mod) 94.85 cm3     Assessment and Plan:     Brief HPI:     HFpEF decompensation   COPD/Asthma exacerbation  ? Pneumonia   S/p TVAR  A.fib   CAD      EKG V-paced  Continue diuresis. Will recommend IV diuresis for one more day and re assess.   Continue breathing supportive treatment. Still with significant rhonchi and shortness of breath   Abx per 1ry team   Medical ttx for CAD   I/Os and daily WT     Active Diagnoses:    Diagnosis Date Noted POA    PRINCIPAL PROBLEM:  Acute on chronic heart failure with preserved ejection fraction [I50.33] 09/30/2022 Yes    Chronic atrial fibrillation [I48.20] 11/12/2020 Yes    CAD (coronary artery disease) [I25.10] 10/30/2020 Yes    Morbid obesity [E66.01] 11/20/2019 Yes    Chronic diastolic heart failure [I50.32] 11/20/2019 Yes    S/P TAVR (transcatheter aortic valve replacement) [Z95.2] 11/19/2019 Not Applicable    Spinal stenosis of lumbar region with neurogenic claudication [M48.062] 06/10/2019 Yes    Lumbar spondylosis [M47.816] 06/10/2019 Yes    DDD (degenerative disc  disease), lumbar [M51.36] 05/14/2019 Yes    Diabetic polyneuropathy associated with type 2 diabetes mellitus [E11.42] 02/19/2019 Yes    Umbilical hernia without obstruction and without gangrene [K42.9] 05/09/2017 Yes    COPD exacerbation [J44.1] 01/02/2017 Yes    Old lacunar stroke without late effect [Z86.73] 12/09/2016 Not Applicable    Pure hypercholesterolemia [E78.00] 06/17/2016 Yes    Essential hypertension [I10] 04/08/2016 Yes    Chronic mesenteric ischemia [K55.1] 04/16/2015 Yes    Type 2 diabetes mellitus [E11.9] 11/24/2014 Yes    Severe persistent asthma with acute exacerbation [J45.51] 11/24/2014 Yes    GERD (gastroesophageal reflux disease) [K21.9] 11/24/2014 Yes      Problems Resolved During this Admission:       VTE Risk Mitigation (From admission, onward)           Ordered     apixaban tablet 5 mg  2 times daily         09/30/22 1255     Reason for No Pharmacological VTE Prophylaxis  Once        Question:  Reasons:  Answer:  Already adequately anticoagulated on oral Anticoagulants    09/30/22 1255     IP VTE HIGH RISK PATIENT  Once         09/30/22 1255     Place sequential compression device  Until discontinued         09/30/22 1255                    Edilberto Avilez MD  Cardiology  [place] [facility]

## 2022-10-01 NOTE — PT/OT/SLP EVAL
PLEASE CALL THEM THEY NEED TO [facility] [employer] QUESTION IS DOES IT NEED TO BE THE BRAND NAME?    PLEASE CALL [phone]   Speech Language Pathology Evaluation  Bedside Swallow    Patient Name:  Adriana Strong   MRN:  [id number]  Admitting Diagnosis: Acute on chronic heart failure with preserved ejection fraction    Recommendations:                 General Recommendations:  Dysphagia therapy  Diet recommendations:  Regular Diet - IDDSI Level 7, Mildly thick/Nectar thick liquids - IDDSI Level 2   Aspiration Precautions: 1 bite/sip at a time, Alternating bites/sips, HOB to 90 degrees, Meds whole 1 at a time, Monitor for s/s of aspiration, Remain upright 30 minutes post meal, Small bites/sips, Strict aspiration precautions, and Wear oxygen during intake   General Precautions: Standard, fall, nectar thick  Communication strategies:  none    History:     History of Present Illness:  Adriana Strong is a 77 y.o. female who has a PMHx HFpEF (LVEF 55%), pacemaker placement, adult-onset Asthma, Atrial fibrillation, Coronary artery disease, COPD, Type 2 Diabetes mellitus, Esophageal erosions, GERD, HLD, HTN, Irritable bowel syndrome, aortic stenosis s/p bioprosthetic TAVR, Stroke, TIA (transient ischemic attack).      The patient presented to [facility] on 9/30/2022 with a primary complaint of worsening, shortness of breath waking her from sleep x4 days a/w nonproductive cough, chest pain/tightness, dizziness, and generalized weakness. States she was not exerting herself that day. She has been previously hospitalized for SOB that was dx'd as acute exacerbation of her asthma, but has not been hospitalized for CHF in the past. Her albuterol inhaler has not improved symptoms during this current episode. Pt has several drug allergies on record, which she confirms. Denies fever, chills, N/V/D.      Pt has a history of porcine aortic valve replacement 11/2019, for which she is on anticoagulant therapy. Surgical history of multiple stents, hernia repair, hysterectomy. Prior TIA.     In the ED, gave her a dose of 125mg solumedrol,  duoneb, and 80mg of lasix. Pt arrived tachypneic, hypertensive to 192/77, and satting 88% on RA, then placed on 2L NC and satting 97%.     Past Medical History:   Diagnosis Date    Acute on chronic diastolic (congestive) heart failure 11/20/2019    Anticoagulant long-term use     on Plavix since May 2015    Anxiety     Arthritis     Asthma     Atrial fibrillation     Atrial fibrillation with RVR 10/19/2020    Cataracts, bilateral     Chest pain, atypical     Chronic atrial fibrillation with rapid ventricular response 6/23/2017    Colon polyp     Coronary artery disease     Diabetes mellitus     Dry eyes     Esophageal erosions     GERD (gastroesophageal reflux disease)     Heart murmur     Hemorrhoid     High cholesterol     Hypertension     Irritable bowel syndrome     Paroxysmal atrial fibrillation 10/30/2020    Persistent atrial fibrillation 2/10/2020    Shingles 2015    Stenosis of aortic and mitral valves     Stroke     TIA (transient ischemic attack)     Use of cane as ambulatory aid        Past Surgical History:   Procedure Laterality Date    ABDOMINAL SURGERY      stents to SMA    angiocele      2007, 2014    AORTIC VALVE REPLACEMENT N/A 11/19/2019    Procedure: Replacement-valve-aortic;  Surgeon: Jack Capone MD;  Location: [facility] CATH LAB;  Service: Cardiology;  Laterality: N/A;    BREAST SURGERY      left--- a lump--- no cancer    CARDIAC VALVE SURGERY      COLONOSCOPY  2014    COLONOSCOPY N/A 3/14/2018    Procedure: COLONOSCOPY;  Surgeon: Efren Kemp MD;  Location: [facility] ENDO ([address] [place] [address]);  Service: Endoscopy;  Laterality: N/A;  ok to hold Plavix 5 days prior to procedure per Dr RESHMA Hernandez     ok per Dr Kemp to hold Eliquis 2 days prior to procedure (see telephone encounter dated-2/7/18)    HERNIA REPAIR      HYSTERECTOMY  partial    1982 partial hysterectomy    LEFT HEART CATHETERIZATION Right 11/5/2019    Procedure: Left heart cath;  Surgeon: Jack Capone MD;  Location: [facility] CATH LAB;  Service:  "Cardiology;  Laterality: Right;    left nasal polyp      left neck lymph node      nose polyp      right hip fatty mass tissue      stent to small intestine      SUPERIOR VENA CAVA ANGIOPLASTY / STENTING      TONSILLECTOMY      TOTAL ABDOMINAL HYSTERECTOMY      2014    TOTAL KNEE ARTHROPLASTY Bilateral     TREATMENT OF CARDIAC ARRHYTHMIA N/A 2/10/2020    Procedure: CARDIOVERSION;  Surgeon: Jayy Parrish MD;  Location: [facility] EP LAB;  Service: Cardiology;  Laterality: N/A;  AF, PEDRO, DCCV, MAC, DM, 3 Prep    TREATMENT OF CARDIAC ARRHYTHMIA N/A 5/15/2020    Procedure: CARDIOVERSION;  Surgeon: Hany Bee MD;  Location: [facility] EP LAB;  Service: Cardiology;  Laterality: N/A;  AF, PEDRO, DCCV, MAC, DM, 3 Prep    UPPER GASTROINTESTINAL ENDOSCOPY  2014       Social History: Patient lives at home.     Prior Intubation HX:  None this admit     Modified Barium Swallow: None per EMR    Chest X-Rays: 9/30 - No detrimental change when compared with 08/11/2022.    Prior diet: Regular/thin    Occupation/hobbies/homemaking: watching TV    Subjective     RN approved ST for evaluation. ST entered the patients room with the patient laying in bed, awake/alert. The patient was able to greet ST and was agreeable to BSE. Patient with SOB and chronic coughing.   Patient goals: "feel better"    Pain/Comfort:   0/10    Respiratory Status:  NC    Objective:     Oral Musculature Evaluation  Oral Musculature: WFL  Dentition: present and adequate  Secretion Management: adequate  Mucosal Quality: good  Mandibular Strength and Mobility: WFL  Lingual Strength and Mobility: WFL  Velar Elevation: WFL  Buccal Strength and Mobility: WFL  Volitional Cough: intact  Volitional Swallow: intact  Voice Prior to PO Intake: audible, clear    Bedside Swallow Eval:   Consistencies Assessed:  Thin liquids x4 single straw sips   Nectar thick liquids x3 single straw sips   Mixed consistencies x2 tsp bites of peaches   Solids x1 bite sized piece of swati cracker  " "    Oral Phase:   WFL    Pharyngeal Phase:   coughing/choking    Compensatory Strategies  None    Treatment: ST noted patient with chronic coughing before, during and after all po trials. ST unable to determine if coughing during intake was in relation to s/s of aspiration. ST did note patient with SOB throughout trial and "gasp of air" after trigger of pharyngeal swallow. Patient stating she preferred thickened liquids at this time. ST recommending regular/nectar liquid diet at this time. If SOB or coughing does not improve, MBSS may be required to determine safest means of nutrition.     ST completed extensive education with the patient on SLP role, POC, diagnostic information, importance of utilizing safe swallowing precautions and all results/recs. The patient verbalized good understanding. ST allowed time for all of the patients questions be asked/answered that were within ST's scope. ST notified RN if all results/recs.       Assessment:     Adriana Strong is a 77 y.o. female admitted with SOB x4 days. The patient is currently requiring a regular diet with nectar thickened liquids. Speech, language and cognition are at baseline. ST will follow to determine safety and least restrictive means of nutrition.     Goals:   Multidisciplinary Problems       SLP Goals          Problem: SLP                        Plan:     Patient to be seen:  3 x/week   Plan of Care expires:  10/31/22  Plan of Care reviewed with:  patient   SLP Follow-Up:  Yes       Discharge recommendations:   (TBD)   Barriers to Discharge:  None    Time Tracking:     SLP Treatment Date:   10/01/22  Speech Start Time:  0936  Speech Stop Time:  0959     Speech Total Time (min):  23 min    Billable Minutes: Eval Swallow and Oral Function 13 and Self Care/Home Management Training 10    10/01/2022     Madi Mccracken M.S., CCC-SLP   Speech Language Pathologist         "

## 2022-10-01 NOTE — PLAN OF CARE
10/1/2022: BSE completed. Patient with chronic coughing before, during and after all po trials. Patient stating she preferred thickened liquids. ST recommending regular diet with nectar thickened liquids. ST will follow.   Madi Mccracken M.S., CCC-SLP   Speech Language Pathologist

## 2022-10-01 NOTE — PLAN OF CARE
Problem: Diabetes Comorbidity  Goal: Blood Glucose Level Within Targeted Range  Outcome: Ongoing, Progressing     Problem: Hypertension Comorbidity  Goal: Blood Pressure in Desired Range  Outcome: Ongoing, Progressing     Problem: Respiratory Compromise (Heart Failure)  Goal: Effective Oxygenation and Ventilation  Outcome: Ongoing, Progressing     Problem: Fall Injury Risk  Goal: Absence of Fall and Fall-Related Injury  Outcome: Ongoing, Progressing     Problem: COPD (Chronic Obstructive Pulmonary Disease) Comorbidity  Goal: Maintenance of COPD Symptom Control  Outcome: Ongoing, Progressing     Problem: Skin Injury Risk Increased  Goal: Skin Health and Integrity  Outcome: Ongoing, Progressing

## 2022-10-02 VITALS
BODY MASS INDEX: 40.16 KG/M2 | WEIGHT: 235.25 LBS | HEIGHT: 64 IN | TEMPERATURE: 97 F | DIASTOLIC BLOOD PRESSURE: 61 MMHG | SYSTOLIC BLOOD PRESSURE: 128 MMHG | RESPIRATION RATE: 18 BRPM | OXYGEN SATURATION: 95 % | HEART RATE: 69 BPM

## 2022-10-02 LAB
ALBUMIN SERPL BCP-MCNC: 3.3 G/DL (ref 3.5–5.2)
ALP SERPL-CCNC: 39 U/L (ref 55–135)
ALT SERPL W/O P-5'-P-CCNC: 29 U/L (ref 10–44)
ANION GAP SERPL CALC-SCNC: 11 MMOL/L (ref 8–16)
AST SERPL-CCNC: 23 U/L (ref 10–40)
BASOPHILS # BLD AUTO: 0.01 K/UL (ref 0–0.2)
BASOPHILS NFR BLD: 0.1 % (ref 0–1.9)
BILIRUB SERPL-MCNC: 0.4 MG/DL (ref 0.1–1)
BUN SERPL-MCNC: 22 MG/DL (ref 8–23)
CALCIUM SERPL-MCNC: 9.2 MG/DL (ref 8.7–10.5)
CHLORIDE SERPL-SCNC: 96 MMOL/L (ref 95–110)
CO2 SERPL-SCNC: 32 MMOL/L (ref 23–29)
CREAT SERPL-MCNC: 0.7 MG/DL (ref 0.5–1.4)
DIFFERENTIAL METHOD: ABNORMAL
EOSINOPHIL # BLD AUTO: 0 K/UL (ref 0–0.5)
EOSINOPHIL NFR BLD: 0 % (ref 0–8)
ERYTHROCYTE [DISTWIDTH] IN BLOOD BY AUTOMATED COUNT: 13.7 % (ref 11.5–14.5)
EST. GFR  (NO RACE VARIABLE): >60 ML/MIN/1.73 M^2
GLUCOSE SERPL-MCNC: 170 MG/DL (ref 70–110)
HCT VFR BLD AUTO: 37.7 % (ref 37–48.5)
HGB BLD-MCNC: 12.6 G/DL (ref 12–16)
IMM GRANULOCYTES # BLD AUTO: 0.03 K/UL (ref 0–0.04)
IMM GRANULOCYTES NFR BLD AUTO: 0.3 % (ref 0–0.5)
LYMPHOCYTES # BLD AUTO: 1 K/UL (ref 1–4.8)
LYMPHOCYTES NFR BLD: 10.3 % (ref 18–48)
MAGNESIUM SERPL-MCNC: 1.8 MG/DL (ref 1.6–2.6)
MCH RBC QN AUTO: 31.5 PG (ref 27–31)
MCHC RBC AUTO-ENTMCNC: 33.4 G/DL (ref 32–36)
MCV RBC AUTO: 94 FL (ref 82–98)
MONOCYTES # BLD AUTO: 0.5 K/UL (ref 0.3–1)
MONOCYTES NFR BLD: 4.8 % (ref 4–15)
NEUTROPHILS # BLD AUTO: 8.6 K/UL (ref 1.8–7.7)
NEUTROPHILS NFR BLD: 84.5 % (ref 38–73)
NRBC BLD-RTO: 0 /100 WBC
PHOSPHATE SERPL-MCNC: 2.6 MG/DL (ref 2.7–4.5)
PLATELET # BLD AUTO: 273 K/UL (ref 150–450)
PMV BLD AUTO: 10.5 FL (ref 9.2–12.9)
POCT GLUCOSE: 143 MG/DL (ref 70–110)
POCT GLUCOSE: 243 MG/DL (ref 70–110)
POTASSIUM SERPL-SCNC: 3.4 MMOL/L (ref 3.5–5.1)
PROT SERPL-MCNC: 7.2 G/DL (ref 6–8.4)
RBC # BLD AUTO: 4 M/UL (ref 4–5.4)
SODIUM SERPL-SCNC: 139 MMOL/L (ref 136–145)
WBC # BLD AUTO: 10.13 K/UL (ref 3.9–12.7)

## 2022-10-02 PROCEDURE — 25000003 PHARM REV CODE 250: Performed by: STUDENT IN AN ORGANIZED HEALTH CARE EDUCATION/TRAINING PROGRAM

## 2022-10-02 PROCEDURE — 94640 AIRWAY INHALATION TREATMENT: CPT

## 2022-10-02 PROCEDURE — 25000242 PHARM REV CODE 250 ALT 637 W/ HCPCS

## 2022-10-02 PROCEDURE — 84100 ASSAY OF PHOSPHORUS: CPT

## 2022-10-02 PROCEDURE — 85025 COMPLETE CBC W/AUTO DIFF WBC: CPT

## 2022-10-02 PROCEDURE — 25000003 PHARM REV CODE 250

## 2022-10-02 PROCEDURE — 63600175 PHARM REV CODE 636 W HCPCS: Performed by: STUDENT IN AN ORGANIZED HEALTH CARE EDUCATION/TRAINING PROGRAM

## 2022-10-02 PROCEDURE — 94668 MNPJ CHEST WALL SBSQ: CPT

## 2022-10-02 PROCEDURE — 80053 COMPREHEN METABOLIC PANEL: CPT

## 2022-10-02 PROCEDURE — 94761 N-INVAS EAR/PLS OXIMETRY MLT: CPT

## 2022-10-02 PROCEDURE — 83735 ASSAY OF MAGNESIUM: CPT

## 2022-10-02 PROCEDURE — 92526 ORAL FUNCTION THERAPY: CPT

## 2022-10-02 PROCEDURE — 36415 COLL VENOUS BLD VENIPUNCTURE: CPT

## 2022-10-02 PROCEDURE — 25000242 PHARM REV CODE 250 ALT 637 W/ HCPCS: Performed by: STUDENT IN AN ORGANIZED HEALTH CARE EDUCATION/TRAINING PROGRAM

## 2022-10-02 RX ORDER — PREDNISONE 20 MG/1
TABLET ORAL
Qty: 7 TABLET | Refills: 0 | Status: SHIPPED | OUTPATIENT
Start: 2022-10-03 | End: 2022-10-02 | Stop reason: HOSPADM

## 2022-10-02 RX ORDER — DOXYCYCLINE HYCLATE 100 MG
100 TABLET ORAL EVERY 12 HOURS
Qty: 5 TABLET | Refills: 0 | Status: SHIPPED | OUTPATIENT
Start: 2022-10-02 | End: 2022-10-02 | Stop reason: SDUPTHER

## 2022-10-02 RX ORDER — DOXYCYCLINE HYCLATE 100 MG
100 TABLET ORAL EVERY 12 HOURS
Status: DISCONTINUED | OUTPATIENT
Start: 2022-10-02 | End: 2022-10-02 | Stop reason: HOSPADM

## 2022-10-02 RX ORDER — DOXYCYCLINE HYCLATE 100 MG
100 TABLET ORAL EVERY 12 HOURS
Qty: 4 TABLET | Refills: 0 | Status: SHIPPED | OUTPATIENT
Start: 2022-10-03 | End: 2022-10-05

## 2022-10-02 RX ADMIN — POLYETHYLENE GLYCOL 3350 17 G: 17 POWDER, FOR SOLUTION ORAL at 09:10

## 2022-10-02 RX ADMIN — APIXABAN 5 MG: 5 TABLET, FILM COATED ORAL at 09:10

## 2022-10-02 RX ADMIN — FLUTICASONE FUROATE AND VILANTEROL TRIFENATATE 1 PUFF: 100; 25 POWDER RESPIRATORY (INHALATION) at 08:10

## 2022-10-02 RX ADMIN — DOXYCYCLINE HYCLATE 100 MG: 100 TABLET, COATED ORAL at 09:10

## 2022-10-02 RX ADMIN — DOXYCYCLINE HYCLATE 100 MG: 100 TABLET, COATED ORAL at 01:10

## 2022-10-02 RX ADMIN — METHYLPREDNISOLONE SODIUM SUCCINATE 125 MG: 125 INJECTION, POWDER, FOR SOLUTION INTRAMUSCULAR; INTRAVENOUS at 01:10

## 2022-10-02 RX ADMIN — RANOLAZINE 500 MG: 500 TABLET, FILM COATED, EXTENDED RELEASE ORAL at 09:10

## 2022-10-02 RX ADMIN — FLUTICASONE PROPIONATE 50 MCG: 50 SPRAY, METERED NASAL at 06:10

## 2022-10-02 RX ADMIN — CLOPIDOGREL 75 MG: 75 TABLET, FILM COATED ORAL at 09:10

## 2022-10-02 RX ADMIN — ISOSORBIDE MONONITRATE 60 MG: 30 TABLET, EXTENDED RELEASE ORAL at 09:10

## 2022-10-02 RX ADMIN — INSULIN ASPART 2 UNITS: 100 INJECTION, SOLUTION INTRAVENOUS; SUBCUTANEOUS at 01:10

## 2022-10-02 RX ADMIN — DILTIAZEM HYDROCHLORIDE 240 MG: 120 CAPSULE, COATED, EXTENDED RELEASE ORAL at 09:10

## 2022-10-02 RX ADMIN — CARVEDILOL 6.25 MG: 6.25 TABLET, FILM COATED ORAL at 09:10

## 2022-10-02 RX ADMIN — PANTOPRAZOLE SODIUM 40 MG: 40 TABLET, DELAYED RELEASE ORAL at 09:10

## 2022-10-02 RX ADMIN — DOCUSATE SODIUM AND SENNOSIDES 1 TABLET: 8.6; 5 TABLET, FILM COATED ORAL at 09:10

## 2022-10-02 RX ADMIN — IPRATROPIUM BROMIDE AND ALBUTEROL SULFATE 3 ML: .5; 2.5 SOLUTION RESPIRATORY (INHALATION) at 08:10

## 2022-10-02 RX ADMIN — FUROSEMIDE 40 MG: 40 TABLET ORAL at 09:10

## 2022-10-02 RX ADMIN — GABAPENTIN 300 MG: 300 CAPSULE ORAL at 03:10

## 2022-10-02 RX ADMIN — POTASSIUM BICARBONATE 10 MEQ: 391 TABLET, EFFERVESCENT ORAL at 09:10

## 2022-10-02 RX ADMIN — ATORVASTATIN CALCIUM 10 MG: 10 TABLET, FILM COATED ORAL at 09:10

## 2022-10-02 RX ADMIN — IPRATROPIUM BROMIDE AND ALBUTEROL SULFATE 3 ML: .5; 2.5 SOLUTION RESPIRATORY (INHALATION) at 12:10

## 2022-10-02 RX ADMIN — DULOXETINE 20 MG: 20 CAPSULE, DELAYED RELEASE ORAL at 09:10

## 2022-10-02 RX ADMIN — IPRATROPIUM BROMIDE AND ALBUTEROL SULFATE 3 ML: .5; 2.5 SOLUTION RESPIRATORY (INHALATION) at 11:10

## 2022-10-02 RX ADMIN — GABAPENTIN 300 MG: 300 CAPSULE ORAL at 09:10

## 2022-10-02 RX ADMIN — MONTELUKAST 10 MG: 10 TABLET, FILM COATED ORAL at 09:10

## 2022-10-02 RX ADMIN — LOSARTAN POTASSIUM 25 MG: 25 TABLET, FILM COATED ORAL at 09:10

## 2022-10-02 NOTE — PROGRESS NOTES
VN cued into room to review discharge instructions.  Went over doctor specific instructions, new medications, where to , follow up appointments, when to call the doctor.  All questions answered.  Informed pt of survey.  Transport requested.       10/02/22 1524   Shift   Virtual Nurse - Rounding Complete   Virtual Nurse - Patient Verbalized Approval Of Camera Use;VN Rounding   Type of Frequent Check   Type Other (see comments)  (discharge)

## 2022-10-02 NOTE — PLAN OF CARE
Pt on documented O2. Decreased to 1lpm. No apparent respiratory distress noted. Will continue to monitor.

## 2022-10-02 NOTE — PT/OT/SLP PROGRESS
"Speech Language Pathology Treatment    Patient Name:  Adriana Strong   MRN:  [id number]  Admitting Diagnosis: Acute on chronic heart failure with preserved ejection fraction    Recommendations:                 General Recommendations:  Follow-up not indicated  Diet recommendations:  Regular Diet - IDDSI Level 7, Liquid Diet Level: Thin liquids - IDDSI Level 0   Aspiration Precautions: 1 bite/sip at a time, Alternating bites/sips, Avoid talking while eating, Frequent oral care, HOB to 90 degrees, Meds whole 1 at a time, Monitor for s/s of aspiration, Remain upright 30 minutes post meal, Small bites/sips, and Standard aspiration precautions   General Precautions: Standard, fall, nectar thick  Communication strategies:  none    Subjective     ST entered the patients room with the patient sitting up in bed, alert.   Patient goals: "I feel better!"    Pain/Comfort:  Pain Rating 1: 0/10    Respiratory Status: Room air    Objective:     Has the patient been evaluated by SLP for swallowing?   Yes  Keep patient NPO? No   Current Respiratory Status:   stable - RA    The patient was able to greet ST and was agreeable to tx. Patients  was present at the bedside setting up breakfast tray. Patient no longer on oxygen and tolerating well. ST noted patient with strong, clear vocal quality and improvement in coughing.     Patient stating she has been consuming some thin liquids 2/2 running out of thickener. ST observed the patient with bites off of her regular breakfast tray and single straw sips of thin liquids. The patient was able to consume all intake without overt s/s of aspiration. Patient stating she feels better and is swallowing better today. Patient only with x2 coughs during tx session, however, both were not in relation to intake. ST recommending diet advancement back to regular/thin at this time. ST will sign off as patient is back to baseline.     ST completed extensive education with the patient on SLP role, " POC, diagnostic information, importance of utilizing safe swallowing precautions and all results/recs. The patient verbalized good understanding. ST allowed time for all of the patients questions be asked/answered that were within ST's scope. ST notified RN and MD of all results/recs.     Assessment:     Adriana Strong is a 77 y.o. female admitted with SOB. Patient back to baseline for speech, language, cognition and swallowing. Resume regular/thin diet. ST will sign off.     Goals:   Multidisciplinary Problems       SLP Goals       Not on file              Multidisciplinary Problems (Resolved)          Problem: SLP    Goal Priority Disciplines Outcome   SLP Goal   (Resolved)     SLP Met   Description: Goals:   1. Patient will successfully participate in ongoing clinical swallow evaluation and tolerate po trials with no overt s/s of aspiration.    2. Patient will tolerate regular consistency/nectar thickened liquids po diet with no overt s/s of aspiration.                        Plan:     Plan of Care expires:  10/02/22  Plan of Care reviewed with:  patient, spouse   SLP Follow-Up:  No       Discharge recommendations:   (no further ST needed)   Barriers to Discharge:  None    Time Tracking:     SLP Treatment Date:   10/02/22  Speech Start Time:  0834  Speech Stop Time:  0850     Speech Total Time (min):  16 min    Billable Minutes: Treatment Swallowing Dysfunction 16    10/02/2022    Madi Mccracken M.S., CCC-SLP   Speech Language Pathologist

## 2022-10-02 NOTE — DISCHARGE SUMMARY
[facility] Hospital Medicine Discharge Summary    Primary Team: [facility] Hospitalist Team B  Attending Physician: Jarrod Corrales MD  Resident: Dee  Intern: Beto    Date of Admit: 9/30/2022  Date of Discharge: 10/2/2022    Discharge to: Home  Condition: Stable    Discharge Diagnoses     Patient Active Problem List   Diagnosis    Enlarged ovary    Type 2 diabetes mellitus    Severe persistent asthma with acute exacerbation    GERD (gastroesophageal reflux disease)    Chronic mesenteric ischemia    Mesenteric artery insufficiency    Gastroesophageal reflux disease    Essential hypertension    Pure hypercholesterolemia    Old lacunar stroke without late effect    COPD exacerbation    Constipation    Aortic stenosis    Umbilical hernia without obstruction and without gangrene    Incisional hernia, without obstruction or gangrene    COPD (chronic obstructive pulmonary disease)    Costochondritis    Diabetic polyneuropathy associated with type 2 diabetes mellitus    DDD (degenerative disc disease), lumbar    Cataract    Low back pain    Difficulty walking    Muscle weakness    Balance problems    Decreased range of motion of trunk and back    Lumbar spondylosis    Neuroforaminal stenosis of lumbar spine    Spinal stenosis of lumbar region with neurogenic claudication    Lumbar radiculopathy    S/P TAVR (transcatheter aortic valve replacement)    Morbid obesity    Chronic diastolic heart failure    Vasovagal syncope    TACOS (acute kidney injury)    Headache    Atrial flutter    Hyponatremia    Leukocytosis    Dysuria    Comfort measures only status    Acute cystitis without hematuria    Other chest pain    CAD (coronary artery disease)    Asthma in adult    Chronic diastolic CHF (congestive heart failure)    Chronic atrial fibrillation    BMI 40.0-44.9, adult    Localized edema    History of transcatheter aortic valve replacement (TAVR)    Coronary artery disease involving native coronary artery of native heart without angina pectoris     Aortic atherosclerosis    Numbness and tingling    Seizure-like activity    Acute on chronic heart failure with preserved ejection fraction       Consultants and Procedures     Consultants:  Cardiology    Procedures:   N/a    Imaging:  CXR  No detrimental change when compared with 08/11/2022.    CT Chest w/o  1. Ill-defined airspace nodules seen throughout both lungs, most concerning for infection.  The differential includes aspiration and alveolar edema.  2. Findings concerning for tracheobronchomalacia.  3. Severe left-sided coronary artery calcifications.  4. Severe abdominal aortic calcifications, including severe calcification of the celiac origin.    Brief History of Present Illness      Adriana Strong is a 77 y.o. female who has a PMHx HFpEF (LVEF 55%), pacemaker placement, adult-onset Asthma, Atrial fibrillation, Coronary artery disease, COPD, Type 2 Diabetes mellitus, Esophageal erosions, GERD, HLD, HTN, Irritable bowel syndrome, aortic stenosis s/p bioprosthetic TAVR, Stroke, TIA (transient ischemic attack).      The patient presented to [facility] on 9/30/2022 with a primary complaint of worsening, shortness of breath waking her from sleep x4 days a/w nonproductive cough, chest pain/tightness, dizziness, and generalized weakness. States she was not exerting herself that day. She has been previously hospitalized for SOB that was dx'd as acute exacerbation of her asthma, but has not been hospitalized for CHF in the past. Her albuterol inhaler has not improved symptoms during this current episode. Pt has several drug allergies on record, which she confirms. Denies fever, chills, N/V/D.     Discharge Physical Exam:  Physical Exam  Constitutional:       General: She is not in acute distress.     Appearance: Normal appearance. She is obese. She is not toxic-appearing.   HENT:      Head: Normocephalic and atraumatic.      Nose: Nose normal.      Mouth/Throat:      Mouth: Mucous membranes  are moist.      Pharynx: Oropharynx is clear.   Eyes:      General: No scleral icterus.     Conjunctiva/sclera: Conjunctivae normal.   Cardiovascular:      Rate and Rhythm: Normal rate and regular rhythm.      Pulses: Normal pulses.      Heart sounds: Murmur (grade II systolic) heard.  Pulmonary:      Effort: Pulmonary effort is normal.      Breath sounds: No stridor. Wheezing and rhonchi present.      Comments: No dullness to percussion b/l  Musculoskeletal:      Cervical back: Normal range of motion.      Right lower leg: No edema.      Left lower leg: No edema.      Comments: No LE or sacral edema noted   Skin:     General: Skin is warm and dry.      Capillary Refill: Capillary refill takes less than 2 seconds.   Neurological:      General: No focal deficit present.      Mental Status: She is alert and oriented to person, place, and time.   Psychiatric:         Mood and Affect: Mood normal.     For the full HPI please refer to the History & Physical from this admission.    Hospital Course By Problem with Pertinent Findings     Acute Hypoxemic Respiratory Failure  Acute exacerbation of asthma (adult-onset) vs. COPD  - currently on duo-neb, singulair, symbicort, and albuterol prn  - given methylprednisolone, duo-neb in ED; continued on po prednisone inpatient + duonebs  - doxycycline for CAP coverage (keflex allergy)  - pt wheezing >> crackles on exam (R>L), possible mixed picture of fluid overload and asthma vs. COPD exacerbation  - pt reports that her asthma is adult-onset and has had previous hospitalizations for exacerbations in the past that required ICU level care  - Patient continued on Prednisone, Doxycycline, and supplemental O2 and her clinical status improved with time  - Recommend repeat OP PFTs to further delineate respiratory disease process   - Patient stable on 2L NC upon time of discharge  - Instructed patient to continue 2 days worth of prednisone as outpatient, and follow up with PCP     Acute on  chronic HFpEF (LVEF 55%) with hypoxia  Chronic Afib (on eliquis)  S/p Pacemaker  Aortic stenosis s/p Porcine TAVR (on plavix)  - , negative troponin, slightly elevated PCO2 on VBG  - given 80mg IV lasix, x3, with 2.5 L out over 24 hours. No appreciable edema on exam this morning. Bicarb up to 30; therefore transitioned back to home dose lasix 40 BID po. Strict Ins and outs + daily weights  - O2 sat improved from 88% to 97% on 2L NC with improvement of SOB/tachypnea  - unlikely to be angina despite chest tightness, negative troponin and negative stress test on 9/14  - TTE repeated: EF >55%, normal LV size, indeterminate diastolic dysfunction, There is a 26 mm Edward S3 transcutaneously-placed aortic bioprosthesis present. There is no aortic insufficiency present. Prosthetic aortic valve is normal.  - [facility]iologist: [place])  - Outpatient referral to cardiology placed upon discharge  - Patient satting well and asymptomatic at time of discharge      Leukocytosis (WBC 12.4k)  Elevated Lactate (2.4)  Mild bandemia (.08)  - pt with mild leukocytosis and bandemia and elevated lactate  - pt afebrile with no productive cough, subjective fever/chills, or recent sick contacts as pt primarily stays at home   - lactate can be possibly associated with acute respiratory distress  - CT Chest shows ill-defined airspace nodules seen throughout both lungs, most concerning for infection but possibly due to aspiration or alveolar edema  - questionable infectious process involvement, covering with doxy monotherapy (secondary to pt's cephalexin, macrolide, and fluoroquinolone allergies). Pro-cheikh negative.  - will be difficult to accurately trend values given steroid administration, but will continue to monitor for any infectious symptoms  - Pt reports issues with intermittent aspiration, consulted SLP recommending regular diet with nectar thin liquids   - Instructed to advance diet as tolerated upon  discharge, have her PCP evaluate, no signs of infection upon discharge, afebrile     Hx Lacunar CVA  HLD  - history of TIAs  - currently on plavix, eliquis, and atorvastatin, continuing while inpatient     T2DM  Diabetic Polyneuropathy  - currently on metformin, gabapentin  - ordered A1c  - low dose SSI with POCT glucose ACHS  - continue gabapentin at reduced dose while inpatient      HTN  - continue home meds carvedilol, losartan, lasix     GERD  - currently on caltrate, protonix  - continue protonix while inpatient     Chronic Mesenteric Ischemia  Fatty Liver Disease  - no inpatient management needs    Discharge Medications        Medication List        START taking these medications      doxycycline 100 MG tablet  Commonly known as: VIBRA-TABS  Take 1 tablet (100 mg total) by mouth every 12 (twelve) hours. for 4 doses  Start taking on: October 3, 2022            CONTINUE taking these medications      acetaminophen 650 MG Tbsr  Commonly known as: TYLENOL     albuterol-ipratropium 2.5 mg-0.5 mg/3 mL nebulizer solution  Commonly known as: DUO-NEB     apixaban 5 mg Tab  Commonly known as: ELIQUIS  Take 1 tablet (5 mg total) by mouth 2 (two) times daily.     ascorbic acid (vitamin C) 500 MG tablet  Commonly known as: VITAMIN C     atorvastatin 10 MG tablet  Commonly known as: LIPITOR  Take 1 tablet (10 mg total) by mouth once daily.     bisacodyL 5 mg EC tablet  Commonly known as: DULCOLAX  Take 1 tablet (5 mg total) by mouth daily as needed for Constipation.     CALTRATE 600 + D ORAL     carvediloL 6.25 MG tablet  Commonly known as: COREG  Take 1 tablet (6.25 mg total) by mouth 2 (two) times daily.     clopidogreL 75 mg tablet  Commonly known as: PLAVIX  Take 1 tablet (75 mg total) by mouth once daily.     cyanocobalamin 1000 MCG tablet  Commonly known as: VITAMIN B-12  Take 1 tablet (1,000 mcg total) by mouth once daily.     diltiaZEM 240 MG 24 hr capsule  Commonly known as: CARDIZEM CD  Take 1 capsule (240 mg  total) by mouth once daily.     DULoxetine 20 MG capsule  Commonly known as: CYMBALTA  Take 1 capsule (20 mg total) by mouth once daily.     fexofenadine 60 MG tablet  Commonly known as: ALLEGRA  Take 3 tablets (180 mg total) by mouth every morning.     fish oil-omega-3 fatty acids 300-1,000 mg capsule     fluticasone propionate 50 mcg/actuation nasal spray  Commonly known as: FLONASE  1 spray (50 mcg total) by Each Nostril route every morning.     furosemide 40 MG tablet  Commonly known as: LASIX  Take 1 tablet (40 mg total) by mouth 2 (two) times a day.     gabapentin 300 MG capsule  Commonly known as: NEURONTIN  Take 2 capsules (600 mg total) by mouth 3 (three) times daily.     isosorbide mononitrate 60 MG 24 hr tablet  Commonly known as: IMDUR  Take 1 tablet (60 mg total) by mouth once daily.     LORazepam 0.5 MG tablet  Commonly known as: ATIVAN  Take 1 tablet (0.5 mg total) by mouth every morning.     losartan 25 MG tablet  Commonly known as: COZAAR  Take 1 tablet (25 mg total) by mouth once daily.     metFORMIN 500 MG tablet  Commonly known as: GLUCOPHAGE  Take 1 tablet (500 mg total) by mouth 2 (two) times daily.     montelukast 10 mg tablet  Commonly known as: SINGULAIR     nitroGLYCERIN 0.4 MG SL tablet  Commonly known as: NITROSTAT  Place 1 tablet (0.4 mg total) under the tongue every 5 (five) minutes as needed for Chest pain.     oxybutynin 5 MG Tr24  Commonly known as: DITROPAN-XL  Take 1 tablet (5 mg total) by mouth every other day.     pantoprazole 40 MG tablet  Commonly known as: PROTONIX  Take 1 tablet (40 mg total) by mouth once daily.     potassium gluconate 550 mg (90 mg) Tab     ranolazine 500 MG Tb12  Commonly known as: RANEXA  Take 1 tablet (500 mg total) by mouth 2 (two) times daily.     REFRESH DRY EYE THERAPY OPHT     SYMBICORT 160-4.5 mcg/actuation Hfaa  Generic drug: budesonide-formoterol 160-4.5 mcg     vitamin D 1000 units Tab  Commonly known as: VITAMIN D3     WOMEN'S 50 PLUS  MULTIVITAMIN ORAL            STOP taking these medications      albuterol 2.5 mg /3 mL (0.083 %) nebulizer solution  Commonly known as: PROVENTIL               Where to Get Your Medications        These medications were sent to [facility]/pharmacy #[id number] - [address] AT CORNER OF [address] [address]      Phone: [phone]   doxycycline 100 MG tablet       Discharge Information:   Diet:  Advance as tolerated    Physical Activity:  As tolerated, encouraged patient to not over exert herself             Instructions:  1. Take all medications as prescribed  2. Keep all follow-up appointments  3. Return to the hospital or call your primary care physicians if any worsening symptoms such as fever, chest pain, shortness of breath, return of symptoms, or any other concerns.    Follow-Up Appointments:  Cardiologist   PCP    Gm Real MD  [facility] Internal Medicine, HO-I  [facility] Hospitalist Team B    Medicine Service - Team B  [facility] Medicine Hospitalist Pager numbers:   [facility] Hospitalist Medicine Team A (Rajinder/Isis): [phone]  [facility] Hospitalist Medicine Team B (Antoni/Carleen): [phone]

## 2022-10-02 NOTE — PLAN OF CARE
Problem: Adult Inpatient Plan of Care  Goal: Plan of Care Review  Outcome: Ongoing, Progressing     Plan of care reviewed with patient, understanding verbalized. Pt is AAO x 4 on 2 L NC, denies SOB and pain, no distress noted. Pt is NSR on Tele, no true red alarms. All medications administered as prescribed. No acute events overnight. Pt is currently resting, bed in lowest position, HOB elevated, side rails up x 2, bed alarm activated, call light and bedside table within reach. Pt instructed to call if assistance is needed.

## 2022-10-02 NOTE — PLAN OF CARE
10/02/22 1336   Final Note   Assessment Type Final Discharge Note   Anticipated Discharge Disposition Home   What phone number can be called within the next 1-3 days to see how you are doing after discharge? [phone]   Post-Acute Status   Discharge Delays None known at this time      spoke with patient regarding discharge plan to home. Pt lives with her spouse, does not drive and does not work. Patient is dependent with ADL's , spouse helps as needed. Pt has medical equipment ( BSC, W/C, walker, shower bench, grab bars) in the home, no HH . Pt has no issues with medication or financials at this time. Pt's spouse was at bedside and will transport pt home at D/C. Follow up appointment request will be forwarded to schedulers for PCP.    Future Appointments   Date Time Provider Department Center   10/26/2022  9:20 AM [place] Marie[place] MD [facility] CARDIO Abiodunlamarisela

## 2022-10-02 NOTE — PLAN OF CARE
Problem: SLP  Goal: SLP Goal  Description: Goals:   1. Patient will successfully participate in ongoing clinical swallow evaluation and tolerate po trials with no overt s/s of aspiration.    2. Patient will tolerate regular consistency/nectar thickened liquids po diet with no overt s/s of aspiration.   Outcome: Met     10/2/2022: Patient with improvement. Patient cleared to resume reg/thin diet. ST will sign off.   Madi Mccracken M.S., CCC-SLP   Speech Language Pathologist

## 2022-10-03 ENCOUNTER — PATIENT OUTREACH (OUTPATIENT)
Dept: ADMINISTRATIVE | Facility: CLINIC | Age: 77
End: 2022-10-03
Payer: MEDICARE

## 2022-10-03 NOTE — PROGRESS NOTES
C3 Nurse made second attempt to reach patient for TCC call. Left voicemail asking patient to please call [phone] and leave first name, last name, and , and Sirena will return call.

## 2022-10-03 NOTE — PROGRESS NOTES
C3 Nurse made third attempt to reach patient for TCC call. Left voicemail asking patient to please call [phone] and leave first name, last name, and , and Sirena will return call.

## 2022-10-03 NOTE — PROGRESS NOTES
C3 nurse attempted to contact Adriana Strong  for a TCC post hospital discharge follow up call. No answer. Left voicemail with callback information. The patient does not have a scheduled HOSFU appointment. Message sent to PCP staff for assistance with scheduling visit with patient.

## 2022-10-10 NOTE — PHYSICIAN QUERY
PT Name: Adriana Strong  MR #: [id number]     DOCUMENTATION CLARIFICATION     CDS/: Raquel Olson RN             Contact information:[email or web address]  This form is a permanent document in the medical record.     Query Date: October 10, 2022    By submitting this query, we are merely seeking further clarification of documentation.  Please utilize your independent clinical judgment when addressing the question(s) below.  The Medical Record contains the following   Indicators   Supporting Clinical Findings Location in Medical Record   x SOB, WHITLEY, Wheezing, Productive Cough, Use of Accessory Muscles, etc. - pt satted 88% on RA, placed on 2L NC with improvement of SOB/tachypnea    Lungs: rales R>L and wheezes R>L , normal chest excursion, tachypnea   H&P        Hospital Medicine   x RR         ABGs         O2 sat Resp  Av.4  Min: 17  Max: 28  SpO2  Av.9 %  Min: 88 %  Max: 98 % H&P        Hospital Medicine    Hypoxia/Hypercapnia      BiPAP/Intubation/Mechanical Ventilation     x Supplemental O2 O2 2L NC RN Flow sheets    Home O2, Oxygen Dependence     x Respiratory distress or failure ED Management:  Pt presents with hypoxic respiratory distress and hypertensive emergency. Concern for CHF and COPD exacerbation. Pt treated initially with duonebs and steroids with minimal improvement in shortness of breath. Pt noted improvement after being placed  on 2L nasal cannula. Lab work concerning for elevated BNP and pt treated with IV lasix. Pt with mild improvement but continued tachypnea and increased work of breathing. Pt admitted for further management and care.            Critical care was required for this patient who presented with hypoxic resp failure with oxygen sats at 88% despite initial therapy with duoneb, steroids, diuresis  - likely secondary to COPD, CHF +/- pneumonia.     Patient presents with hypoxia, respiratory distress and hypertension.   Dimished breath sounds at the bases,  moderate wheezing and wet cough auscultated in mid and upper lung fields bilaterally.  Patient given DuoNeb and Lasix with no improved still tachypneic on my evaluation, oxygen saturation 88% on room air. My overall  impression is CHF and COPD exacerbation, hypoxic resp failure. Possible pneumonia     ED Provider Note 9/30                          ED Provider Note 9/30          ED Provider Note 9/30         x Radiology findings .Ill-defined airspace nodules seen throughout both lungs, most concerning for infection.The differential includes aspiration and alveolar edema.  2.Findings concerning for tracheobronchomalacia.  3.Severe left-sided coronary artery calcifications.  4.Severe abdominal aortic calcifications, including severe calcification of the celiac origin. CT chest 9/30   x Acute/Chronic Illness Acute on chronic HFpEF (LVEF 55%) with hypoxia  Chronic Afib  Aortic Stneosis s/p Porcine TAVR  ?Acute exacerbation of asthma (adult-onset) vs. COPD  T2DM  HTN  GERD   H&P 9/30        Hospital Medicine   x Treatment Pt treated initially with duonebs and steroids with minimal improvement in shortness of breath. Pt noted improvement after being placed  on 2L nasal cannula.  ED Provider Note 9/30   x Other   POC PH 7.35 - 7.45  7.462 (H)   POC PCO2 35 - 45 mmHg 45.0   POC PO2 80 - 100 mmHg 93   POC HCO3 24 - 28 mmol/L 32.2 (H)   POC TCO2 23 - 27 mmol/L 34 (H)   POC SATURATED O2 95 - 100 % 98      VBG 9/30       The noted clinical guidelines are only system guidelines and do not replace the providers clinical judgment.    Provider, please clarify the  Respiratory diagnosis or diagnoses associated with above clinical findings.     [ X  ] Acute Respiratory Failure with Hypoxia - ABG pO2 < 60 mmHg or O2 sat of <91% on room air and respiratory symptoms documented     [    ] Acute Respiratory Distress - Generally describes less severe respiratory symptoms (tachypnea, in respiratory distress, increased work of breathing,  unable to speak in complete sentences, labored breathing, use of accessory muscles, RR> 24, cyanosis, dyspnea, wheezing, stridor, lethargy) without sufficient measurements (pO2, SpO2, pH, and pCO2) to meet criteria for respiratory failure     [    ] Other Respiratory Diagnosis (please specify): _________________     [   ] Clinically Undetermined         Please document in your progress notes daily for the duration of treatment until resolved and include in your discharge summary.     Form No. [id number]

## 2022-10-12 NOTE — PHYSICIAN QUERY
Query Date: October 10, 2022     By submitting this query, we are merely seeking further clarification of documentation.  Please utilize your independent clinical judgment when addressing the question(s) below.  The Medical Record contains the following    Indicators                           Supporting Clinical Findings Location in Medical Record   x SOB, WHITLEY, Wheezing, Productive Cough, Use of Accessory Muscles, etc. - pt satted 88% on RA, placed on 2L NC with improvement of SOB/tachypnea     Lungs: rales R>L and wheezes R>L , normal chest excursion, tachypnea    H&P        Hospital Medicine   x RR         ABGs         O2 sat Resp  Av.4  Min: 17  Max: 28  SpO2  Av.9 %  Min: 88 %  Max: 98 % H&P        Hospital Medicine     Hypoxia/Hypercapnia         BiPAP/Intubation/Mechanical Ventilation       x Supplemental O2 O2 2L NC RN Flow sheets     Home O2, Oxygen Dependence       x Respiratory distress or failure ED Management:  Pt presents with hypoxic respiratory distress and hypertensive emergency. Concern for CHF and COPD exacerbation. Pt treated initially with duonebs and steroids with minimal improvement in shortness of breath. Pt noted improvement after being placed  on 2L nasal cannula. Lab work concerning for elevated BNP and pt treated with IV lasix. Pt with mild improvement but continued tachypnea and increased work of breathing. Pt admitted for further management and care.              Critical care was required for this patient who presented with hypoxic resp failure with oxygen sats at 88% despite initial therapy with duoneb, steroids, diuresis  - likely secondary to COPD, CHF +/- pneumonia.      Patient presents with hypoxia, respiratory distress and hypertension.   Dimished breath sounds at the bases, moderate wheezing and wet cough auscultated in mid and upper lung fields bilaterally.  Patient given DuoNeb and Lasix with no improved still tachypneic on my evaluation, oxygen saturation  88% on room air. My overall  impression is CHF and COPD exacerbation, hypoxic resp failure. Possible pneumonia       ED Provider Note 9/30                                      ED Provider Note 9/30              ED Provider Note 9/30         x Radiology findings .Ill-defined airspace nodules seen throughout both lungs, most concerning for infection.The differential includes aspiration and alveolar edema.  2.Findings concerning for tracheobronchomalacia.  3.Severe left-sided coronary artery calcifications.  4.Severe abdominal aortic calcifications, including severe calcification of the celiac origin. CT chest 9/30   x Acute/Chronic Illness Acute on chronic HFpEF (LVEF 55%) with hypoxia  Chronic Afib  Aortic Stneosis s/p Porcine TAVR  ?Acute exacerbation of asthma (adult-onset) vs. COPD  T2DM  HTN  GERD    H&P 9/30        Hospital Medicine   x Treatment Pt treated initially with duonebs and steroids with minimal improvement in shortness of breath. Pt noted improvement after being placed  on 2L nasal cannula.  ED Provider Note 9/30   x Other    POC PH 7.35 - 7.45  7.462 (H)   POC PCO2 35 - 45 mmHg 45.0   POC PO2 80 - 100 mmHg 93   POC HCO3 24 - 28 mmol/L 32.2 (H)   POC TCO2 23 - 27 mmol/L 34 (H)   POC SATURATED O2 95 - 100 % 98       VBG 9/30         The noted clinical guidelines are only system guidelines and do not replace the providers clinical judgment.     Provider, please clarify the  Respiratory diagnosis or diagnoses associated with above clinical findings.      [ X  ] Acute Respiratory Failure with Hypoxia - ABG pO2 < 60 mmHg or O2 sat of <91% on room air and respiratory symptoms documented      [    ] Acute Respiratory Distress - Generally describes less severe respiratory symptoms (tachypnea, in respiratory distress, increased work of breathing, unable to speak in complete sentences, labored breathing, use of accessory muscles, RR> 24, cyanosis, dyspnea, wheezing, stridor, lethargy) without  sufficient measurements (pO2, SpO2, pH, and pCO2) to meet criteria for respiratory failure      [    ] Other Respiratory Diagnosis (please specify): _________________      [   ] Clinically Undetermined              Gm Real MD  [facility] Internal Medicine, HO-I  [facility] Hospitalist Team B    Medicine Service - Team B  [facility] Medicine Hospitalist Pager numbers:   [facility] Hospitalist Medicine Team A (Rajinder/Isis): [phone]  [facility] Hospitalist Medicine Team B (Antoni/Carleen): [phone]

## 2022-10-26 ENCOUNTER — OFFICE VISIT (OUTPATIENT)
Dept: CARDIOLOGY | Facility: CLINIC | Age: 77
End: 2022-10-26
Payer: MEDICARE

## 2022-10-26 VITALS
HEIGHT: 64 IN | WEIGHT: 240 LBS | SYSTOLIC BLOOD PRESSURE: 139 MMHG | BODY MASS INDEX: 40.97 KG/M2 | DIASTOLIC BLOOD PRESSURE: 70 MMHG | HEART RATE: 69 BPM

## 2022-10-26 DIAGNOSIS — I10 ESSENTIAL HYPERTENSION: ICD-10-CM

## 2022-10-26 DIAGNOSIS — R26.2 DIFFICULTY WALKING: ICD-10-CM

## 2022-10-26 DIAGNOSIS — I50.33 ACUTE ON CHRONIC DIASTOLIC CONGESTIVE HEART FAILURE: ICD-10-CM

## 2022-10-26 DIAGNOSIS — I48.19 PERSISTENT ATRIAL FIBRILLATION: ICD-10-CM

## 2022-10-26 DIAGNOSIS — I25.111 CORONARY ARTERY DISEASE INVOLVING NATIVE CORONARY ARTERY OF NATIVE HEART WITH ANGINA PECTORIS WITH DOCUMENTED SPASM: Primary | ICD-10-CM

## 2022-10-26 DIAGNOSIS — E78.00 PURE HYPERCHOLESTEROLEMIA: ICD-10-CM

## 2022-10-26 DIAGNOSIS — M25.69 DECREASED RANGE OF MOTION OF TRUNK AND BACK: ICD-10-CM

## 2022-10-26 DIAGNOSIS — Z95.2 S/P TAVR (TRANSCATHETER AORTIC VALVE REPLACEMENT): ICD-10-CM

## 2022-10-26 DIAGNOSIS — E66.01 MORBID OBESITY: ICD-10-CM

## 2022-10-26 PROCEDURE — 99215 PR OFFICE/OUTPT VISIT, EST, LEVL V, 40-54 MIN: ICD-10-PCS | Mod: S$GLB,,, | Performed by: INTERNAL MEDICINE

## 2022-10-26 PROCEDURE — 1126F PR PAIN SEVERITY QUANTIFIED, NO PAIN PRESENT: ICD-10-PCS | Mod: CPTII,S$GLB,, | Performed by: INTERNAL MEDICINE

## 2022-10-26 PROCEDURE — 1111F DSCHRG MED/CURRENT MED MERGE: CPT | Mod: CPTII,S$GLB,, | Performed by: INTERNAL MEDICINE

## 2022-10-26 PROCEDURE — 3078F DIAST BP <80 MM HG: CPT | Mod: CPTII,S$GLB,, | Performed by: INTERNAL MEDICINE

## 2022-10-26 PROCEDURE — 3288F PR FALLS RISK ASSESSMENT DOCUMENTED: ICD-10-PCS | Mod: CPTII,S$GLB,, | Performed by: INTERNAL MEDICINE

## 2022-10-26 PROCEDURE — 3288F FALL RISK ASSESSMENT DOCD: CPT | Mod: CPTII,S$GLB,, | Performed by: INTERNAL MEDICINE

## 2022-10-26 PROCEDURE — 3075F SYST BP GE 130 - 139MM HG: CPT | Mod: CPTII,S$GLB,, | Performed by: INTERNAL MEDICINE

## 2022-10-26 PROCEDURE — 3078F PR MOST RECENT DIASTOLIC BLOOD PRESSURE < 80 MM HG: ICD-10-PCS | Mod: CPTII,S$GLB,, | Performed by: INTERNAL MEDICINE

## 2022-10-26 PROCEDURE — 1157F ADVNC CARE PLAN IN RCRD: CPT | Mod: CPTII,S$GLB,, | Performed by: INTERNAL MEDICINE

## 2022-10-26 PROCEDURE — 1111F PR DISCHARGE MEDS RECONCILED W/ CURRENT OUTPATIENT MED LIST: ICD-10-PCS | Mod: CPTII,S$GLB,, | Performed by: INTERNAL MEDICINE

## 2022-10-26 PROCEDURE — 1101F PT FALLS ASSESS-DOCD LE1/YR: CPT | Mod: CPTII,S$GLB,, | Performed by: INTERNAL MEDICINE

## 2022-10-26 PROCEDURE — 99215 OFFICE O/P EST HI 40 MIN: CPT | Mod: S$GLB,,, | Performed by: INTERNAL MEDICINE

## 2022-10-26 PROCEDURE — 1159F PR MEDICATION LIST DOCUMENTED IN MEDICAL RECORD: ICD-10-PCS | Mod: CPTII,S$GLB,, | Performed by: INTERNAL MEDICINE

## 2022-10-26 PROCEDURE — 3075F PR MOST RECENT SYSTOLIC BLOOD PRESS GE 130-139MM HG: ICD-10-PCS | Mod: CPTII,S$GLB,, | Performed by: INTERNAL MEDICINE

## 2022-10-26 PROCEDURE — 1126F AMNT PAIN NOTED NONE PRSNT: CPT | Mod: CPTII,S$GLB,, | Performed by: INTERNAL MEDICINE

## 2022-10-26 PROCEDURE — 99999 PR PBB SHADOW E&M-EST. PATIENT-LVL V: ICD-10-PCS | Mod: PBBFAC,,, | Performed by: INTERNAL MEDICINE

## 2022-10-26 PROCEDURE — 1157F PR ADVANCE CARE PLAN OR EQUIV PRESENT IN MEDICAL RECORD: ICD-10-PCS | Mod: CPTII,S$GLB,, | Performed by: INTERNAL MEDICINE

## 2022-10-26 PROCEDURE — 1159F MED LIST DOCD IN RCRD: CPT | Mod: CPTII,S$GLB,, | Performed by: INTERNAL MEDICINE

## 2022-10-26 PROCEDURE — 99999 PR PBB SHADOW E&M-EST. PATIENT-LVL V: CPT | Mod: PBBFAC,,, | Performed by: INTERNAL MEDICINE

## 2022-10-26 PROCEDURE — 1101F PR PT FALLS ASSESS DOC 0-1 FALLS W/OUT INJ PAST YR: ICD-10-PCS | Mod: CPTII,S$GLB,, | Performed by: INTERNAL MEDICINE

## 2022-10-26 RX ORDER — RANOLAZINE 1000 MG/1
1000 TABLET, EXTENDED RELEASE ORAL 2 TIMES DAILY
Qty: 180 TABLET | Refills: 3 | Status: SHIPPED | OUTPATIENT
Start: 2022-10-26 | End: 2023-10-19 | Stop reason: SDUPTHER

## 2022-10-26 NOTE — PROGRESS NOTES
/Subjective:   Patient ID:  Adriana Strong is a 77 y.o. female who presents for follow up  of Coronary Artery Disease, Hyperlipidemia, Hypertension, and Chest Pain          HPI:         77 year old female with CAD, HLD, HTN. AS s/p TAVR, DM, CVA, CHF, AF and PPM is here for follow-up.  She was in emergency room room July 2022 complaining of chest discomfort.  Her workup was overall unremarkable.  She has a outpatient PET stress ordered that revealed no reversible ischemia and normal ejection fraction.  She recalls having chest tightness during the stress test concerning for angina.  In August 2022 she was admitted for chest discomfort or overall unremarkable workup.  She returned in September 2022 with decompensated heart failure.  She responded well to IV diuretics.  She continues to complain of intermittent chest discomfort on 4 antianginal agents.             [facility] 2019 prior to TAVR:  1.  The LVH made coronary angiography very difficult.  A 10 cc was required for coronary angiograms.  2.  The left main was free of disease.  It was very short making cannulation of the LAD difficulty.  3.  The LAD had a long tubular stenosis of 70% at the diagonal bifurcation.  The diagonal was a larger vessel than the LAD making intervention on the LAD suboptimal.  4.  The left circumflex was normal.  5.  The right coronary artery was normal.        Venous US 9/2022    There is no evidence of a right lower extremity DVT.  The right superficial femoral middle vein is normal.  There is no evidence of a left lower extremity DVT.  The right greater saphenous vein has reflux.  The left greater saphenous vein has reflux.    Echo 10/2022     Normal EF   Normal TAVR   PASP 47 mmHg        Patient Active Problem List    Diagnosis Date Noted    Acute on chronic heart failure with preserved ejection fraction 09/30/2022    Seizure-like activity     Numbness and tingling 12/13/2021    Aortic atherosclerosis 06/25/2021    History of  transcatheter aortic valve replacement (TAVR) 12/08/2020    Coronary artery disease involving native coronary artery of native heart without angina pectoris 12/08/2020    Acute on chronic diastolic (congestive) heart failure 11/12/2020    Chronic atrial fibrillation 11/12/2020    BMI 40.0-44.9, adult 11/12/2020    Localized edema 11/12/2020    CAD (coronary artery disease) 10/30/2020    Asthma in adult 10/30/2020    Other chest pain 10/29/2020    Comfort measures only status 10/21/2020    Acute cystitis without hematuria     Leukocytosis 10/19/2020    Dysuria 10/19/2020    Hyponatremia 09/01/2020    Atrial flutter 05/15/2020    Vasovagal syncope 11/26/2019    TACOS (acute kidney injury) 11/26/2019    Headache 11/26/2019    Morbid obesity 11/20/2019    Chronic diastolic heart failure 11/20/2019    S/P TAVR (transcatheter aortic valve replacement) 11/19/2019    Lumbar radiculopathy 06/25/2019    Decreased range of motion of trunk and back 06/10/2019    Lumbar spondylosis 06/10/2019    Neuroforaminal stenosis of lumbar spine 06/10/2019    Spinal stenosis of lumbar region with neurogenic claudication 06/10/2019    Low back pain 06/06/2019    Difficulty walking 06/06/2019    Muscle weakness 06/06/2019    Balance problems 06/06/2019    DDD (degenerative disc disease), lumbar 05/14/2019    Cataract 05/14/2019    Diabetic polyneuropathy associated with type 2 diabetes mellitus 02/19/2019    Costochondritis 06/23/2017    COPD (chronic obstructive pulmonary disease) 06/13/2017    Umbilical hernia without obstruction and without gangrene 05/09/2017    Incisional hernia, without obstruction or gangrene 05/09/2017    Aortic stenosis 02/03/2017    Constipation 01/04/2017    COPD exacerbation 01/02/2017    Old lacunar stroke without late effect 12/09/2016     On Eliquis and plavix      Persistent atrial fibrillation 07/08/2016    Pure hypercholesterolemia 06/17/2016    Essential hypertension 04/08/2016    Gastroesophageal reflux  disease     Mesenteric artery insufficiency 2015    Chronic mesenteric ischemia 2015    Enlarged ovary 2014    Type 2 diabetes mellitus 2014     A1C 7.0      Severe persistent asthma with acute exacerbation 2014    GERD (gastroesophageal reflux disease) 2014           Right Arm BP - Sittin/70  Left Arm BP - Sittin/72        LABS      LAST HbA1c  Lab Results   Component Value Date    HGBA1C 5.8 (H) 2022       Lipid panel  Lab Results   Component Value Date    CHOL 168 2022    CHOL 143 2021    CHOL 161 2020     Lab Results   Component Value Date    HDL 69 2022    HDL 56 2021    HDL 75 2020     Lab Results   Component Value Date    LDLCALC 87.0 2022    LDLCALC 69.4 2021    LDLCALC 75.2 2020     Lab Results   Component Value Date    TRIG 60 2022    TRIG 88 2021    TRIG 54 2020     Lab Results   Component Value Date    CHOLHDL 41.1 2022    CHOLHDL 39.2 2021    CHOLHDL 46.6 2020            Review of Systems   Constitutional: Negative for diaphoresis, night sweats, weight gain and weight loss.   HENT:  Negative for congestion.    Eyes:  Negative for blurred vision, discharge and double vision.   Cardiovascular:  Positive for chest pain and dyspnea on exertion. Negative for claudication, cyanosis, irregular heartbeat, leg swelling, near-syncope, orthopnea, palpitations, paroxysmal nocturnal dyspnea and syncope.   Respiratory:  Positive for shortness of breath. Negative for cough and wheezing.    Endocrine: Negative for cold intolerance, heat intolerance and polyphagia.   Hematologic/Lymphatic: Negative for adenopathy and bleeding problem. Does not bruise/bleed easily.   Skin:  Negative for dry skin and nail changes.   Musculoskeletal:  Positive for arthritis. Negative for back pain, falls, joint pain, myalgias and neck pain.   Gastrointestinal:  Negative for bloating, abdominal  pain, change in bowel habit and constipation.   Genitourinary:  Negative for bladder incontinence, dysuria, flank pain, genital sores and missed menses.   Neurological:  Negative for aphonia, brief paralysis, difficulty with concentration, dizziness and weakness.   Psychiatric/Behavioral:  Negative for altered mental status and memory loss. The patient does not have insomnia.    Allergic/Immunologic: Negative for environmental allergies.     Objective:   Physical Exam  Constitutional:       Appearance: She is well-developed.      Interventions: She is not intubated.  HENT:      Head: Normocephalic and atraumatic.      Right Ear: External ear normal.      Left Ear: External ear normal.   Eyes:      General: No scleral icterus.        Right eye: No discharge.         Left eye: No discharge.      Conjunctiva/sclera: Conjunctivae normal.      Pupils: Pupils are equal, round, and reactive to light.   Neck:      Thyroid: No thyromegaly.      Vascular: Normal carotid pulses. No carotid bruit, hepatojugular reflux or JVD.      Trachea: No tracheal deviation.   Cardiovascular:      Rate and Rhythm: Normal rate. Rhythm irregularly irregular. No extrasystoles are present.     Chest Wall: PMI is not displaced.      Pulses:           Carotid pulses are 2+ on the right side and 2+ on the left side.       Radial pulses are 2+ on the right side and 2+ on the left side.        Femoral pulses are 2+ on the right side and 2+ on the left side.       Popliteal pulses are 2+ on the right side and 2+ on the left side.        Dorsalis pedis pulses are 1+ on the right side and 1+ on the left side.        Posterior tibial pulses are 1+ on the right side and 1+ on the left side.      Heart sounds: S1 normal and S2 normal. Heart sounds not distant. No midsystolic click. Murmur heard.   Systolic murmur is present with a grade of 2/6 at the upper right sternal border.     No friction rub. No gallop. No S3 sounds.   Pulmonary:      Effort:  Pulmonary effort is normal. No tachypnea, bradypnea, accessory muscle usage or respiratory distress. She is not intubated.      Breath sounds: Normal breath sounds. No stridor. No decreased breath sounds, wheezing or rales.   Chest:      Chest wall: Tenderness present.   Abdominal:      General: There is no distension or abdominal bruit.      Palpations: There is no mass or pulsatile mass.      Tenderness: There is no abdominal tenderness. There is no guarding or rebound.   Musculoskeletal:         General: No tenderness. Normal range of motion.      Cervical back: Normal range of motion and neck supple.   Lymphadenopathy:      Cervical: No cervical adenopathy.   Skin:     General: Skin is warm.      Coloration: Skin is not pale.      Findings: No erythema or rash.   Neurological:      Mental Status: She is alert and oriented to person, place, and time.      Cranial Nerves: No cranial nerve deficit.      Coordination: Coordination normal.      Deep Tendon Reflexes: Reflexes are normal and symmetric.   Psychiatric:         Behavior: Behavior normal.         Thought Content: Thought content normal.         Judgment: Judgment normal.       Assessment:     1. Coronary artery disease involving native coronary artery of native heart with angina pectoris with documented spasm    2. Acute on chronic diastolic congestive heart failure    3. S/P TAVR (transcatheter aortic valve replacement)    4. Essential hypertension    5. Pure hypercholesterolemia    6. Persistent atrial fibrillation    7. BMI 40.0-44.9, adult    8. Morbid obesity    9. Difficulty walking    10. Decreased range of motion of trunk and back        Plan:     We had a long discussion with the patient  regarding recurrent chest discomfort with a known history of coronary disease.  Reviewed her angiogram prior to transfemoral aortic valve replacement which revealed diffuse disease in the left anterior descending artery involving the bifurcation diagonal  saad.  They are both concerned with technical capability to treat this complex lesion.  At this time the rather continue with adjustment of anti anginal therapy.  If symptoms persists will discuss left heart catheterization with possible intervention. Follow up in 4 weeks after increasing ranexa from 500 to 1000 mg bid. If CP persists she will have a [facility].          Weight loss.  Exercise.  Low-salt diet.    Continue with diuretic dose as previously prescribed.    Compression stockings 20-30 mmHg.  Referral to sleep disorder clinic for evaluation and management of RADHA.       Continue with current medical plan and lifestyle changes.  Return sooner for concerns or questions. If symptoms persist go to the ED  I have reviewed all pertinent data on this patient       I have reviewed the patient's medical history in detail and updated the computerized patient record.  No orders of the defined types were placed in this encounter.  Follow up as scheduled. Return sooner for concerns or questions            She expressed verbal understanding and agreed with the plan        -In today's visit, at least 4 established conditions that pose a risk to life or bodily function have been addressed and the conditions are severe.    -In today's visit, monitoring for drug toxicity was accomplished.      Patient's Medications   New Prescriptions    No medications on file   Previous Medications    ACETAMINOPHEN (TYLENOL) 650 MG TBSR    Take 1,300 mg by mouth 2 (two) times daily as needed.    ALBUTEROL-IPRATROPIUM (DUO-NEB) 2.5 MG-0.5 MG/3 ML NEBULIZER SOLUTION    NEBULIZE 1 VIAL EVERY 4 HOURS AS NEEDED    APIXABAN (ELIQUIS) 5 MG TAB    Take 1 tablet (5 mg total) by mouth 2 (two) times daily.    ASCORBIC ACID, VITAMIN C, (VITAMIN C) 500 MG TABLET    Take 1,000 mg by mouth once daily.     ATORVASTATIN (LIPITOR) 10 MG TABLET    Take 1 tablet (10 mg total) by mouth once daily.    BISACODYL (DULCOLAX) 5 MG EC TABLET    Take 1 tablet (5 mg total)  by mouth daily as needed for Constipation.    CALCIUM CARBONATE/VITAMIN D3 (CALTRATE 600 + D ORAL)    Take 1 tablet by mouth once daily.    CARVEDILOL (COREG) 6.25 MG TABLET    Take 1 tablet (6.25 mg total) by mouth 2 (two) times daily.    CLOPIDOGREL (PLAVIX) 75 MG TABLET    Take 1 tablet (75 mg total) by mouth once daily.    CYANOCOBALAMIN (VITAMIN B-12) 1000 MCG TABLET    Take 1 tablet (1,000 mcg total) by mouth once daily.    DILTIAZEM (CARDIZEM CD) 240 MG 24 HR CAPSULE    Take 1 capsule (240 mg total) by mouth once daily.    DULOXETINE (CYMBALTA) 20 MG CAPSULE    Take 1 capsule (20 mg total) by mouth once daily.    FEXOFENADINE (ALLEGRA) 60 MG TABLET    Take 3 tablets (180 mg total) by mouth every morning.    FISH OIL-OMEGA-3 FATTY ACIDS 300-1,000 MG CAPSULE    Take 1 g by mouth once daily.     FLUTICASONE PROPIONATE (FLONASE) 50 MCG/ACTUATION NASAL SPRAY    1 spray (50 mcg total) by Each Nostril route every morning.    FUROSEMIDE (LASIX) 40 MG TABLET    Take 1 tablet (40 mg total) by mouth 2 (two) times a day.    GABAPENTIN (NEURONTIN) 300 MG CAPSULE    Take 2 capsules (600 mg total) by mouth 3 (three) times daily.    ISOSORBIDE MONONITRATE (IMDUR) 60 MG 24 HR TABLET    Take 1 tablet (60 mg total) by mouth once daily.    LORAZEPAM (ATIVAN) 0.5 MG TABLET    Take 1 tablet (0.5 mg total) by mouth every morning.    LOSARTAN (COZAAR) 25 MG TABLET    Take 1 tablet (25 mg total) by mouth once daily.    METFORMIN (GLUCOPHAGE) 500 MG TABLET    Take 1 tablet (500 mg total) by mouth 2 (two) times daily.    MONTELUKAST (SINGULAIR) 10 MG TABLET    Take 10 mg by mouth once daily.    MV-MN/FOLIC AC/CALCIUM/VIT K1 (WOMEN'S 50 PLUS MULTIVITAMIN ORAL)    Take 1 tablet by mouth once daily.    NITROGLYCERIN (NITROSTAT) 0.4 MG SL TABLET    Place 1 tablet (0.4 mg total) under the tongue every 5 (five) minutes as needed for Chest pain.    OXYBUTYNIN (DITROPAN-XL) 5 MG TR24    Take 1 tablet (5 mg total) by mouth every other day.     PANTOPRAZOLE (PROTONIX) 40 MG TABLET    Take 1 tablet (40 mg total) by mouth once daily.    POLYSORBATE 80/GLYCERIN (REFRESH DRY EYE THERAPY OPHT)    Apply to eye 2 (two) times daily.    POTASSIUM GLUCONATE 550 MG (90 MG) TAB    Take 180 mg by mouth 2 (two) times a day.    SYMBICORT 160-4.5 MCG/ACTUATION HFAA    Inhale 1 puff into the lungs 2 (two) times daily.    VITAMIN D (VITAMIN D3) 1000 UNITS TAB    Take 2,000 Units by mouth once daily.   Modified Medications    Modified Medication Previous Medication    RANOLAZINE (RANEXA) 1,000 MG TB12 ranolazine (RANEXA) 500 MG Tb12       Take 1 tablet (1,000 mg total) by mouth 2 (two) times daily.    Take 1 tablet (500 mg total) by mouth 2 (two) times daily.   Discontinued Medications    No medications on file

## 2022-11-22 ENCOUNTER — TELEPHONE (OUTPATIENT)
Dept: CARDIOLOGY | Facility: CLINIC | Age: 77
End: 2022-11-22
Payer: MEDICARE

## 2022-11-22 NOTE — TELEPHONE ENCOUNTER
----- Message from Clara Benitez sent at 11/22/2022  8:24 AM CST -----  Contact: Pt  Pt is returning a missed call from provider staff Leia. Pt stated it was in regards to pace maker and the name of it is [employer]. [employer] was where they were calling from [phone]. Pt was asked to provide that information. Pt is requesting a callback.       Confirmed contact below:   Contact Name:Adriana Strong  Phone Number: [phone]

## 2022-12-07 NOTE — TELEPHONE ENCOUNTER
Patient has a [employer] device.   Implant date 01/14/2022   Serial # [id number]   PID # [id number]    Device was implanted at [facility] by Dr Gagan Doll.    Pt is remotely followed by [facility] in [place].  Will e-mail the office to release the patient.   [email or web address]

## 2022-12-19 ENCOUNTER — TELEPHONE (OUTPATIENT)
Dept: CARDIOLOGY | Facility: CLINIC | Age: 77
End: 2022-12-19
Payer: MEDICARE

## 2022-12-19 NOTE — TELEPHONE ENCOUNTER
Spoke to patient and she is aware of her appointment at the [facility] for Pacemaker and to see Megan Quijano.      NW                          ----- Message from Shirley Romeo sent at 12/16/2022  3:48 PM CST -----  Type:  Patient Returning Call    Who Called:PT  Who Left Message for Patient:OFFICE  Does the patient know what this is regarding?:sooner appointment time on 12/21/22 at 9:30 instead of 10:30   Would the patient rather a call back or a response via [email or web address]? call  Best Call Back Number:[phone]  Additional Information: pt agrees to come in at 9:30

## 2022-12-21 ENCOUNTER — CLINICAL SUPPORT (OUTPATIENT)
Dept: CARDIOLOGY | Facility: CLINIC | Age: 77
End: 2022-12-21
Payer: MEDICARE

## 2022-12-21 ENCOUNTER — OFFICE VISIT (OUTPATIENT)
Dept: CARDIOLOGY | Facility: CLINIC | Age: 77
End: 2022-12-21
Payer: MEDICARE

## 2022-12-21 VITALS
DIASTOLIC BLOOD PRESSURE: 79 MMHG | SYSTOLIC BLOOD PRESSURE: 142 MMHG | BODY MASS INDEX: 41.89 KG/M2 | WEIGHT: 245.38 LBS | HEIGHT: 64 IN | HEART RATE: 76 BPM | OXYGEN SATURATION: 96 %

## 2022-12-21 DIAGNOSIS — I50.32 CHRONIC DIASTOLIC HEART FAILURE: ICD-10-CM

## 2022-12-21 DIAGNOSIS — I70.0 AORTIC ATHEROSCLEROSIS: ICD-10-CM

## 2022-12-21 DIAGNOSIS — I35.0 AORTIC VALVE STENOSIS, ETIOLOGY OF CARDIAC VALVE DISEASE UNSPECIFIED: ICD-10-CM

## 2022-12-21 DIAGNOSIS — I48.92 ATRIAL FLUTTER, UNSPECIFIED TYPE: ICD-10-CM

## 2022-12-21 DIAGNOSIS — I25.10 CORONARY ARTERY DISEASE INVOLVING NATIVE CORONARY ARTERY OF NATIVE HEART WITHOUT ANGINA PECTORIS: ICD-10-CM

## 2022-12-21 DIAGNOSIS — I25.111 CORONARY ARTERY DISEASE INVOLVING NATIVE CORONARY ARTERY OF NATIVE HEART WITH ANGINA PECTORIS WITH DOCUMENTED SPASM: ICD-10-CM

## 2022-12-21 DIAGNOSIS — I10 ESSENTIAL HYPERTENSION: Primary | ICD-10-CM

## 2022-12-21 DIAGNOSIS — Z95.2 HISTORY OF TRANSCATHETER AORTIC VALVE REPLACEMENT (TAVR): ICD-10-CM

## 2022-12-21 DIAGNOSIS — R55 VASOVAGAL SYNCOPE: ICD-10-CM

## 2022-12-21 DIAGNOSIS — E78.00 PURE HYPERCHOLESTEROLEMIA: ICD-10-CM

## 2022-12-21 DIAGNOSIS — I48.20 CHRONIC ATRIAL FIBRILLATION: ICD-10-CM

## 2022-12-21 DIAGNOSIS — I50.33 ACUTE ON CHRONIC DIASTOLIC (CONGESTIVE) HEART FAILURE: ICD-10-CM

## 2022-12-21 DIAGNOSIS — I48.19 PERSISTENT ATRIAL FIBRILLATION: ICD-10-CM

## 2022-12-21 DIAGNOSIS — Z95.2 S/P TAVR (TRANSCATHETER AORTIC VALVE REPLACEMENT): ICD-10-CM

## 2022-12-21 DIAGNOSIS — Z95.0 CARDIAC PACEMAKER IN SITU: ICD-10-CM

## 2022-12-21 PROCEDURE — 99999 PR PBB SHADOW E&M-EST. PATIENT-LVL I: ICD-10-PCS | Mod: PBBFAC,,,

## 2022-12-21 PROCEDURE — 1101F PR PT FALLS ASSESS DOC 0-1 FALLS W/OUT INJ PAST YR: ICD-10-PCS | Mod: CPTII,S$GLB,, | Performed by: NURSE PRACTITIONER

## 2022-12-21 PROCEDURE — 3078F PR MOST RECENT DIASTOLIC BLOOD PRESSURE < 80 MM HG: ICD-10-PCS | Mod: CPTII,S$GLB,, | Performed by: NURSE PRACTITIONER

## 2022-12-21 PROCEDURE — 99999 PR PBB SHADOW E&M-EST. PATIENT-LVL I: CPT | Mod: PBBFAC,,,

## 2022-12-21 PROCEDURE — 3077F PR MOST RECENT SYSTOLIC BLOOD PRESSURE >= 140 MM HG: ICD-10-PCS | Mod: CPTII,S$GLB,, | Performed by: NURSE PRACTITIONER

## 2022-12-21 PROCEDURE — 3078F DIAST BP <80 MM HG: CPT | Mod: CPTII,S$GLB,, | Performed by: NURSE PRACTITIONER

## 2022-12-21 PROCEDURE — 3288F FALL RISK ASSESSMENT DOCD: CPT | Mod: CPTII,S$GLB,, | Performed by: NURSE PRACTITIONER

## 2022-12-21 PROCEDURE — 99999 PR PBB SHADOW E&M-EST. PATIENT-LVL III: CPT | Mod: PBBFAC,,, | Performed by: NURSE PRACTITIONER

## 2022-12-21 PROCEDURE — 93294 REM INTERROG EVL PM/LDLS PM: CPT | Mod: S$GLB,,, | Performed by: INTERNAL MEDICINE

## 2022-12-21 PROCEDURE — 99999 PR PBB SHADOW E&M-EST. PATIENT-LVL III: ICD-10-PCS | Mod: PBBFAC,,, | Performed by: NURSE PRACTITIONER

## 2022-12-21 PROCEDURE — 3077F SYST BP >= 140 MM HG: CPT | Mod: CPTII,S$GLB,, | Performed by: NURSE PRACTITIONER

## 2022-12-21 PROCEDURE — 1126F PR PAIN SEVERITY QUANTIFIED, NO PAIN PRESENT: ICD-10-PCS | Mod: CPTII,S$GLB,, | Performed by: NURSE PRACTITIONER

## 2022-12-21 PROCEDURE — 93294 PR INTERROG EVAL, REMOTE, UP TO 90 DAYS,PACEMAKER: ICD-10-PCS | Mod: S$GLB,,, | Performed by: INTERNAL MEDICINE

## 2022-12-21 PROCEDURE — 1157F ADVNC CARE PLAN IN RCRD: CPT | Mod: CPTII,S$GLB,, | Performed by: NURSE PRACTITIONER

## 2022-12-21 PROCEDURE — 1101F PT FALLS ASSESS-DOCD LE1/YR: CPT | Mod: CPTII,S$GLB,, | Performed by: NURSE PRACTITIONER

## 2022-12-21 PROCEDURE — 99214 PR OFFICE/OUTPT VISIT, EST, LEVL IV, 30-39 MIN: ICD-10-PCS | Mod: S$GLB,,, | Performed by: NURSE PRACTITIONER

## 2022-12-21 PROCEDURE — 99214 OFFICE O/P EST MOD 30 MIN: CPT | Mod: S$GLB,,, | Performed by: NURSE PRACTITIONER

## 2022-12-21 PROCEDURE — 1126F AMNT PAIN NOTED NONE PRSNT: CPT | Mod: CPTII,S$GLB,, | Performed by: NURSE PRACTITIONER

## 2022-12-21 PROCEDURE — 3288F PR FALLS RISK ASSESSMENT DOCUMENTED: ICD-10-PCS | Mod: CPTII,S$GLB,, | Performed by: NURSE PRACTITIONER

## 2022-12-21 PROCEDURE — 1157F PR ADVANCE CARE PLAN OR EQUIV PRESENT IN MEDICAL RECORD: ICD-10-PCS | Mod: CPTII,S$GLB,, | Performed by: NURSE PRACTITIONER

## 2022-12-21 NOTE — PROGRESS NOTES
Cardiology Clinic note    Subjective:   Patient ID:  Adriana Strong is a 77 y.o. female who presents for follow-up of CAD.    77 year old female with CAD, HLD, HTN. AS s/p TAVR, DM, CVA, CHF, AF and PPM     She was in emergency room room July 2022 complaining of chest discomfort.  Her workup was overall unremarkable.  She has a outpatient PET stress ordered that revealed no reversible ischemia and normal ejection fraction.  She recalls having chest tightness during the stress test concerning for angina.  In August 2022 she was admitted for chest discomfort or overall unremarkable workup.  She returned in September 2022 with decompensated heart failure.  She responded well to IV diuretics.  She continues to complain of intermittent chest discomfort on 4 antianginal agents.  Last seen by primary cardiologist October 2022 with recommendations to increase Ranexa from 500 mg to 1000 mg b.i.d. for angina.  She reports improvement in symptoms since the increase and only reports occasional chest pain at this time.  She has not use p.r.n. nitro.  She wishes to defer left heart catheterization at this time.       [facility] 2019 prior to TAVR:  1.  The LVH made coronary angiography very difficult.  A 10 cc was required for coronary angiograms.  2.  The left main was free of disease.  It was very short making cannulation of the LAD difficulty.  3.  The LAD had a long tubular stenosis of 70% at the diagonal bifurcation.  The diagonal was a larger vessel than the LAD making intervention on the LAD suboptimal.  4.  The left circumflex was normal.  5.  The right coronary artery was normal.           Venous US 9/2022     There is no evidence of a right lower extremity DVT.  The right superficial femoral middle vein is normal.  There is no evidence of a left lower extremity DVT.  The right greater saphenous vein has reflux.  The left greater saphenous vein has reflux.     Echo 10/2022                 Normal EF              Normal  TAVR              PASP 47 mmHg         HPI:   Adriana Strong  has a past medical history of Acute on chronic diastolic (congestive) heart failure (11/20/2019), Anticoagulant long-term use, Anxiety, Arthritis, Asthma, Atrial fibrillation, Atrial fibrillation with RVR (10/19/2020), Cataracts, bilateral, Chest pain, atypical, Chronic atrial fibrillation with rapid ventricular response (6/23/2017), Colon polyp, Coronary artery disease, Diabetes mellitus, Dry eyes, Esophageal erosions, GERD (gastroesophageal reflux disease), Heart murmur, Hemorrhoid, High cholesterol, Hypertension, Irritable bowel syndrome, Paroxysmal atrial fibrillation (10/30/2020), Persistent atrial fibrillation (2/10/2020), Shingles (2015), Stenosis of aortic and mitral valves, Stroke, TIA (transient ischemic attack), and Use of cane as ambulatory aid.          Patient Active Problem List    Diagnosis Date Noted    Acute on chronic heart failure with preserved ejection fraction 09/30/2022    Seizure-like activity     Numbness and tingling 12/13/2021    Aortic atherosclerosis 06/25/2021    History of transcatheter aortic valve replacement (TAVR) 12/08/2020    Coronary artery disease involving native coronary artery of native heart without angina pectoris 12/08/2020    Acute on chronic diastolic (congestive) heart failure 11/12/2020    Chronic atrial fibrillation 11/12/2020    BMI 40.0-44.9, adult 11/12/2020    Localized edema 11/12/2020    CAD (coronary artery disease) 10/30/2020    Asthma in adult 10/30/2020    Other chest pain 10/29/2020    Comfort measures only status 10/21/2020    Acute cystitis without hematuria     Leukocytosis 10/19/2020    Dysuria 10/19/2020    Hyponatremia 09/01/2020    Atrial flutter 05/15/2020    Vasovagal syncope 11/26/2019    TACOS (acute kidney injury) 11/26/2019    Headache 11/26/2019    Morbid obesity 11/20/2019    Chronic diastolic heart failure 11/20/2019    S/P TAVR (transcatheter aortic valve replacement)  11/19/2019    Lumbar radiculopathy 06/25/2019    Decreased range of motion of trunk and back 06/10/2019    Lumbar spondylosis 06/10/2019    Neuroforaminal stenosis of lumbar spine 06/10/2019    Spinal stenosis of lumbar region with neurogenic claudication 06/10/2019    Low back pain 06/06/2019    Difficulty walking 06/06/2019    Muscle weakness 06/06/2019    Balance problems 06/06/2019    DDD (degenerative disc disease), lumbar 05/14/2019    Cataract 05/14/2019    Diabetic polyneuropathy associated with type 2 diabetes mellitus 02/19/2019    Costochondritis 06/23/2017    COPD (chronic obstructive pulmonary disease) 06/13/2017    Umbilical hernia without obstruction and without gangrene 05/09/2017    Incisional hernia, without obstruction or gangrene 05/09/2017    Aortic stenosis 02/03/2017    Constipation 01/04/2017    COPD exacerbation 01/02/2017    Old lacunar stroke without late effect 12/09/2016     On Eliquis and plavix      Persistent atrial fibrillation 07/08/2016    Pure hypercholesterolemia 06/17/2016    Essential hypertension 04/08/2016    Gastroesophageal reflux disease     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    ALBUTEROL-IPRATROPIUM (DUO-NEB) 2.5 MG-0.5 MG/3 ML NEBULIZER SOLUTION    NEBULIZE 1 VIAL EVERY 4 HOURS AS NEEDED    APIXABAN (ELIQUIS) 5 MG TAB    Take 1 tablet (5 mg total) by mouth 2 (two) times daily.    ASCORBIC ACID, VITAMIN C, (VITAMIN C) 500 MG TABLET    Take 1,000 mg by mouth once daily.     ATORVASTATIN (LIPITOR) 10 MG TABLET    Take 1 tablet (10 mg total)  by mouth once daily.    BISACODYL (DULCOLAX) 5 MG EC TABLET    Take 1 tablet (5 mg total) by mouth daily as needed for Constipation.    CALCIUM CARBONATE/VITAMIN D3 (CALTRATE 600 + D ORAL)    Take 1 tablet by mouth once daily.    CARVEDILOL (COREG) 6.25 MG TABLET    Take 1 tablet (6.25 mg total) by mouth 2 (two) times daily.    CLOPIDOGREL (PLAVIX) 75 MG TABLET    Take 1 tablet (75 mg total) by mouth once daily.    CYANOCOBALAMIN (VITAMIN B-12) 1000 MCG TABLET    Take 1 tablet (1,000 mcg total) by mouth once daily.    DILTIAZEM (CARDIZEM CD) 240 MG 24 HR CAPSULE    Take 1 capsule (240 mg total) by mouth once daily.    DULOXETINE (CYMBALTA) 20 MG CAPSULE    Take 1 capsule (20 mg total) by mouth once daily.    FEXOFENADINE (ALLEGRA) 60 MG TABLET    Take 3 tablets (180 mg total) by mouth every morning.    FISH OIL-OMEGA-3 FATTY ACIDS 300-1,000 MG CAPSULE    Take 1 g by mouth once daily.     FLUTICASONE PROPIONATE (FLONASE) 50 MCG/ACTUATION NASAL SPRAY    1 spray (50 mcg total) by Each Nostril route every morning.    FUROSEMIDE (LASIX) 40 MG TABLET    Take 1 tablet (40 mg total) by mouth 2 (two) times a day.    GABAPENTIN (NEURONTIN) 300 MG CAPSULE    Take 2 capsules (600 mg total) by mouth 3 (three) times daily.    ISOSORBIDE MONONITRATE (IMDUR) 60 MG 24 HR TABLET    Take 1 tablet (60 mg total) by mouth once daily.    LORAZEPAM (ATIVAN) 0.5 MG TABLET    Take 1 tablet (0.5 mg total) by mouth every morning.    LOSARTAN (COZAAR) 25 MG TABLET    Take 1 tablet (25 mg total) by mouth once daily.    METFORMIN (GLUCOPHAGE) 500 MG TABLET    Take 1 tablet (500 mg total) by mouth 2 (two) times daily.    MONTELUKAST (SINGULAIR) 10 MG TABLET    Take 10 mg by mouth once daily.    MV-MN/FOLIC AC/CALCIUM/VIT K1 (WOMEN'S 50 PLUS MULTIVITAMIN ORAL)    Take 1 tablet by mouth once daily.    NITROGLYCERIN (NITROSTAT) 0.4 MG SL TABLET    Place 1 tablet (0.4 mg total) under the tongue every 5 (five) minutes as needed for Chest pain.     OXYBUTYNIN (DITROPAN-XL) 5 MG TR24    Take 1 tablet (5 mg total) by mouth every other day.    PANTOPRAZOLE (PROTONIX) 40 MG TABLET    Take 1 tablet (40 mg total) by mouth once daily.    POLYSORBATE 80/GLYCERIN (REFRESH DRY EYE THERAPY OPHT)    Apply to eye 2 (two) times daily.    POTASSIUM GLUCONATE 550 MG (90 MG) TAB    Take 180 mg by mouth 2 (two) times a day.    RANOLAZINE (RANEXA) 1,000 MG TB12    Take 1 tablet (1,000 mg total) by mouth 2 (two) times daily.    SYMBICORT 160-4.5 MCG/ACTUATION HFAA    Inhale 1 puff into the lungs 2 (two) times daily.    VITAMIN D (VITAMIN D3) 1000 UNITS TAB    Take 2,000 Units by mouth once daily.   Modified Medications    No medications on file   Discontinued Medications    No medications on file        Review of Systems   Constitutional: Negative for diaphoresis, night sweats, weight gain and weight loss.   HENT:  Negative for congestion.    Eyes:  Negative for blurred vision, discharge and double vision.   Cardiovascular:  Positive for chest pain and dyspnea on exertion. Negative for claudication, cyanosis, irregular heartbeat, leg swelling, near-syncope, orthopnea, palpitations, paroxysmal nocturnal dyspnea and syncope.   Respiratory:  Positive for shortness of breath. Negative for cough and wheezing.    Endocrine: Negative for cold intolerance, heat intolerance and polyphagia.   Hematologic/Lymphatic: Negative for adenopathy and bleeding problem. Does not bruise/bleed easily.   Skin:  Negative for dry skin and nail changes.   Musculoskeletal:  Positive for arthritis. Negative for back pain, falls, joint pain, myalgias and neck pain.   Gastrointestinal:  Negative for bloating, abdominal pain, change in bowel habit and constipation.   Genitourinary:  Negative for bladder incontinence, dysuria, flank pain, genital sores and missed menses.   Neurological:  Negative for aphonia, brief paralysis, difficulty with concentration, dizziness and weakness.   Psychiatric/Behavioral:   "Negative for altered mental status and memory loss. The patient does not have insomnia.    Allergic/Immunologic: Negative for environmental allergies.       Objective:   Vitals  Vitals:    12/21/22 1029   BP: (!) 142/79   Pulse: 76   SpO2: 96%   Weight: 111.3 kg (245 lb 6.4 oz)   Height: 5' 4" (1.626 m)          Physical Exam  Constitutional:       Appearance: She is well-developed.      Interventions: She is not intubated.  HENT:      Head: Normocephalic and atraumatic.      Right Ear: External ear normal.      Left Ear: External ear normal.   Eyes:      General: No scleral icterus.        Right eye: No discharge.         Left eye: No discharge.      Conjunctiva/sclera: Conjunctivae normal.      Pupils: Pupils are equal, round, and reactive to light.   Neck:      Thyroid: No thyromegaly.      Vascular: Normal carotid pulses. No carotid bruit, hepatojugular reflux or JVD.      Trachea: No tracheal deviation.   Cardiovascular:      Rate and Rhythm: Normal rate. Rhythm irregularly irregular. No extrasystoles are present.     Chest Wall: PMI is not displaced.      Pulses:           Carotid pulses are 2+ on the right side and 2+ on the left side.       Radial pulses are 2+ on the right side and 2+ on the left side.        Femoral pulses are 2+ on the right side and 2+ on the left side.       Popliteal pulses are 2+ on the right side and 2+ on the left side.        Dorsalis pedis pulses are 1+ on the right side and 1+ on the left side.        Posterior tibial pulses are 1+ on the right side and 1+ on the left side.      Heart sounds: S1 normal and S2 normal. Heart sounds not distant. No midsystolic click. Murmur heard.   Systolic murmur is present with a grade of 2/6 at the upper right sternal border.     No friction rub. No gallop. No S3 sounds.   Pulmonary:      Effort: Pulmonary effort is normal. No tachypnea, bradypnea, accessory muscle usage or respiratory distress. She is not intubated.      Breath sounds: Normal " breath sounds. No stridor. No decreased breath sounds, wheezing or rales.   Chest:      Chest wall: Tenderness present.   Abdominal:      General: There is no distension or abdominal bruit.      Palpations: There is no mass or pulsatile mass.      Tenderness: There is no abdominal tenderness. There is no guarding or rebound.   Musculoskeletal:         General: No tenderness. Normal range of motion.      Cervical back: Normal range of motion and neck supple.   Lymphadenopathy:      Cervical: No cervical adenopathy.   Skin:     General: Skin is warm.      Coloration: Skin is not pale.      Findings: No erythema or rash.   Neurological:      Mental Status: She is alert and oriented to person, place, and time.      Cranial Nerves: No cranial nerve deficit.      Coordination: Coordination normal.      Deep Tendon Reflexes: Reflexes are normal and symmetric.   Psychiatric:         Behavior: Behavior normal.         Thought Content: Thought content normal.         Judgment: Judgment normal.         Lab Results    Lab Results   Component Value Date    WBC 10.13 10/02/2022    HGB 12.6 10/02/2022    HCT 37.7 10/02/2022    HCT 42 11/26/2019    MCV 94 10/02/2022       Lab Results   Component Value Date     10/02/2022    INR 1.2 09/30/2022       Lab Results   Component Value Date    K 3.4 (L) 10/02/2022    MG 1.8 10/02/2022    BUN 22 10/02/2022    CREATININE 0.7 10/02/2022       Lab Results   Component Value Date     (H) 10/02/2022    HGBA1C 5.8 (H) 08/11/2022       Lab Results   Component Value Date    AST 23 10/02/2022    ALT 29 10/02/2022    ALBUMIN 3.3 (L) 10/02/2022    PROT 7.2 10/02/2022       Lab Results   Component Value Date    CHOL 168 08/11/2022    HDL 69 08/11/2022    LDLCALC 87.0 08/11/2022    TRIG 60 08/11/2022       Lab Results   Component Value Date    CRP 7.9 04/21/2011     (H) 09/30/2022       Assessment:     No diagnosis found.    Plan:   HTN  Above goal; monitor BP at home  If  consistently >130s up-titrate ARB    Hypercholesteremia  Continue statin; LDL 87    CDHF  Chronic, stable  Tales Lasix, ARB, BB    CAD  Single vessel disease LAD; LAD had a long tubular stenosis of 70% at the diagonal bifurcation  Ranexa increased from 500 mg to 1000 mg at night visit  Also takes imdur, prn nitro, diltiazem  Reports significant improvement since Ranexa was increased  Remains with concerns about complex lesion and wishes to defer left heart catheterization at this time    Hx TAVR  Per 10/22 Echo  There is a 26 mm Edward S3 transcutaneously-placed aortic bioprosthesis present. There is no aortic insufficiency present. Prosthetic aortic valve is normal.    Morbid Obesity  Weight loss  Exercise   Low-salt diet    Continue with current medical plan and lifestyle changes.      No orders of the defined types were placed in this encounter.      Follow up in 4 months   Return sooner for concerns or questions. If symptoms persist go to the ED    She expressed verbal understanding and agreed with the plan    Thank you for the opportunity to care for this patient. Will be available for questions if needed.     Total duration of face to face visit time 30 minutes.  Total time spent counseling greater than fifty percent of total visit time.  Counseling included discussion regarding imaging findings, diagnosis, possibilities, treatment options, risks and benefits.    Macie Fitzgerald, KHRIS-C  Cardiology Clinic  [facility]

## 2023-01-03 ENCOUNTER — CLINICAL SUPPORT (OUTPATIENT)
Dept: CARDIOLOGY | Facility: CLINIC | Age: 78
End: 2023-01-03
Payer: MEDICARE

## 2023-01-03 DIAGNOSIS — Z95.0 CARDIAC PACEMAKER IN SITU: Primary | ICD-10-CM

## 2023-01-17 ENCOUNTER — TELEPHONE (OUTPATIENT)
Dept: CARDIOLOGY | Facility: CLINIC | Age: 78
End: 2023-01-17
Payer: MEDICARE

## 2023-01-23 DIAGNOSIS — E78.00 PURE HYPERCHOLESTEROLEMIA: Primary | ICD-10-CM

## 2023-01-23 DIAGNOSIS — I50.42 CHRONIC COMBINED SYSTOLIC AND DIASTOLIC HEART FAILURE: ICD-10-CM

## 2023-03-22 ENCOUNTER — LAB VISIT (OUTPATIENT)
Dept: LAB | Facility: HOSPITAL | Age: 78
End: 2023-03-22
Attending: INTERNAL MEDICINE
Payer: MEDICARE

## 2023-03-22 DIAGNOSIS — I50.42 CHRONIC COMBINED SYSTOLIC AND DIASTOLIC HEART FAILURE: ICD-10-CM

## 2023-03-22 DIAGNOSIS — E78.00 PURE HYPERCHOLESTEROLEMIA: ICD-10-CM

## 2023-03-22 LAB
ALBUMIN SERPL BCP-MCNC: 4.5 G/DL (ref 3.5–5.2)
ALP SERPL-CCNC: 41 U/L (ref 38–126)
ALT SERPL W/O P-5'-P-CCNC: 21 U/L (ref 10–44)
ANION GAP SERPL CALC-SCNC: 6 MMOL/L (ref 8–16)
AST SERPL-CCNC: 24 U/L (ref 15–46)
BILIRUB SERPL-MCNC: 0.5 MG/DL (ref 0.1–1)
CALCIUM SERPL-MCNC: 9.2 MG/DL (ref 8.7–10.5)
CHLORIDE SERPL-SCNC: 98 MMOL/L (ref 95–110)
CHOLEST SERPL-MCNC: 149 MG/DL (ref 120–199)
CHOLEST/HDLC SERPL: 2.4 {RATIO} (ref 2–5)
CO2 SERPL-SCNC: 34 MMOL/L (ref 23–29)
CREAT SERPL-MCNC: 0.84 MG/DL (ref 0.5–1.4)
EST. GFR  (NO RACE VARIABLE): >60 ML/MIN/1.73 M^2
GLUCOSE SERPL-MCNC: 114 MG/DL (ref 70–110)
HDLC SERPL-MCNC: 63 MG/DL (ref 40–75)
HDLC SERPL: 42.3 % (ref 20–50)
LDLC SERPL CALC-MCNC: 62.4 MG/DL (ref 63–159)
NONHDLC SERPL-MCNC: 86 MG/DL
POTASSIUM SERPL-SCNC: 4.7 MMOL/L (ref 3.5–5.1)
PROT SERPL-MCNC: 7.4 G/DL (ref 6–8.4)
SODIUM SERPL-SCNC: 138 MMOL/L (ref 136–145)
TRIGL SERPL-MCNC: 118 MG/DL (ref 30–150)
UUN UR-MCNC: 24 MG/DL (ref 7–17)

## 2023-03-22 PROCEDURE — 80053 COMPREHEN METABOLIC PANEL: CPT | Mod: PO | Performed by: INTERNAL MEDICINE

## 2023-03-22 PROCEDURE — 80061 LIPID PANEL: CPT | Performed by: INTERNAL MEDICINE

## 2023-03-22 PROCEDURE — 36415 COLL VENOUS BLD VENIPUNCTURE: CPT | Mod: PO | Performed by: INTERNAL MEDICINE

## 2023-04-05 ENCOUNTER — OFFICE VISIT (OUTPATIENT)
Dept: CARDIOLOGY | Facility: CLINIC | Age: 78
End: 2023-04-05
Payer: MEDICARE

## 2023-04-05 VITALS
WEIGHT: 243.63 LBS | HEART RATE: 70 BPM | BODY MASS INDEX: 41.59 KG/M2 | DIASTOLIC BLOOD PRESSURE: 73 MMHG | OXYGEN SATURATION: 95 % | SYSTOLIC BLOOD PRESSURE: 117 MMHG | HEIGHT: 64 IN

## 2023-04-05 DIAGNOSIS — R07.89 OTHER CHEST PAIN: ICD-10-CM

## 2023-04-05 DIAGNOSIS — I70.0 AORTIC ATHEROSCLEROSIS: ICD-10-CM

## 2023-04-05 DIAGNOSIS — I48.19 PERSISTENT ATRIAL FIBRILLATION: ICD-10-CM

## 2023-04-05 DIAGNOSIS — K55.1 CHRONIC VASCULAR DISORDERS OF INTESTINE: Primary | ICD-10-CM

## 2023-04-05 DIAGNOSIS — I25.111 CORONARY ARTERY DISEASE INVOLVING NATIVE CORONARY ARTERY OF NATIVE HEART WITH ANGINA PECTORIS WITH DOCUMENTED SPASM: ICD-10-CM

## 2023-04-05 DIAGNOSIS — Z95.2 S/P TAVR (TRANSCATHETER AORTIC VALVE REPLACEMENT): ICD-10-CM

## 2023-04-05 DIAGNOSIS — I10 ESSENTIAL HYPERTENSION: ICD-10-CM

## 2023-04-05 DIAGNOSIS — E66.01 MORBID OBESITY: ICD-10-CM

## 2023-04-05 DIAGNOSIS — E78.00 PURE HYPERCHOLESTEROLEMIA: ICD-10-CM

## 2023-04-05 DIAGNOSIS — K21.9 GASTROESOPHAGEAL REFLUX DISEASE, UNSPECIFIED WHETHER ESOPHAGITIS PRESENT: ICD-10-CM

## 2023-04-05 DIAGNOSIS — I50.32 CHRONIC DIASTOLIC HEART FAILURE: ICD-10-CM

## 2023-04-05 DIAGNOSIS — K55.069 OCCLUSION OF SUPERIOR MESENTERIC ARTERY: ICD-10-CM

## 2023-04-05 DIAGNOSIS — I35.0 AORTIC VALVE STENOSIS, ETIOLOGY OF CARDIAC VALVE DISEASE UNSPECIFIED: ICD-10-CM

## 2023-04-05 DIAGNOSIS — K55.1 MESENTERIC ARTERY INSUFFICIENCY: ICD-10-CM

## 2023-04-05 PROCEDURE — 1159F PR MEDICATION LIST DOCUMENTED IN MEDICAL RECORD: ICD-10-PCS | Mod: CPTII,S$GLB,, | Performed by: INTERNAL MEDICINE

## 2023-04-05 PROCEDURE — 1157F PR ADVANCE CARE PLAN OR EQUIV PRESENT IN MEDICAL RECORD: ICD-10-PCS | Mod: CPTII,S$GLB,, | Performed by: INTERNAL MEDICINE

## 2023-04-05 PROCEDURE — 99999 PR PBB SHADOW E&M-EST. PATIENT-LVL III: ICD-10-PCS | Mod: PBBFAC,,, | Performed by: INTERNAL MEDICINE

## 2023-04-05 PROCEDURE — 99215 PR OFFICE/OUTPT VISIT, EST, LEVL V, 40-54 MIN: ICD-10-PCS | Mod: S$GLB,,, | Performed by: INTERNAL MEDICINE

## 2023-04-05 PROCEDURE — 1157F ADVNC CARE PLAN IN RCRD: CPT | Mod: CPTII,S$GLB,, | Performed by: INTERNAL MEDICINE

## 2023-04-05 PROCEDURE — 1101F PT FALLS ASSESS-DOCD LE1/YR: CPT | Mod: CPTII,S$GLB,, | Performed by: INTERNAL MEDICINE

## 2023-04-05 PROCEDURE — 3288F PR FALLS RISK ASSESSMENT DOCUMENTED: ICD-10-PCS | Mod: CPTII,S$GLB,, | Performed by: INTERNAL MEDICINE

## 2023-04-05 PROCEDURE — 3074F PR MOST RECENT SYSTOLIC BLOOD PRESSURE < 130 MM HG: ICD-10-PCS | Mod: CPTII,S$GLB,, | Performed by: INTERNAL MEDICINE

## 2023-04-05 PROCEDURE — 3078F PR MOST RECENT DIASTOLIC BLOOD PRESSURE < 80 MM HG: ICD-10-PCS | Mod: CPTII,S$GLB,, | Performed by: INTERNAL MEDICINE

## 2023-04-05 PROCEDURE — 1159F MED LIST DOCD IN RCRD: CPT | Mod: CPTII,S$GLB,, | Performed by: INTERNAL MEDICINE

## 2023-04-05 PROCEDURE — 99999 PR PBB SHADOW E&M-EST. PATIENT-LVL III: CPT | Mod: PBBFAC,,, | Performed by: INTERNAL MEDICINE

## 2023-04-05 PROCEDURE — 99215 OFFICE O/P EST HI 40 MIN: CPT | Mod: S$GLB,,, | Performed by: INTERNAL MEDICINE

## 2023-04-05 PROCEDURE — 1125F AMNT PAIN NOTED PAIN PRSNT: CPT | Mod: CPTII,S$GLB,, | Performed by: INTERNAL MEDICINE

## 2023-04-05 PROCEDURE — 3078F DIAST BP <80 MM HG: CPT | Mod: CPTII,S$GLB,, | Performed by: INTERNAL MEDICINE

## 2023-04-05 PROCEDURE — 3074F SYST BP LT 130 MM HG: CPT | Mod: CPTII,S$GLB,, | Performed by: INTERNAL MEDICINE

## 2023-04-05 PROCEDURE — 1101F PR PT FALLS ASSESS DOC 0-1 FALLS W/OUT INJ PAST YR: ICD-10-PCS | Mod: CPTII,S$GLB,, | Performed by: INTERNAL MEDICINE

## 2023-04-05 PROCEDURE — 3288F FALL RISK ASSESSMENT DOCD: CPT | Mod: CPTII,S$GLB,, | Performed by: INTERNAL MEDICINE

## 2023-04-05 PROCEDURE — 1125F PR PAIN SEVERITY QUANTIFIED, PAIN PRESENT: ICD-10-PCS | Mod: CPTII,S$GLB,, | Performed by: INTERNAL MEDICINE

## 2023-04-05 RX ORDER — CLOPIDOGREL BISULFATE 75 MG/1
75 TABLET ORAL DAILY
Qty: 90 TABLET | Refills: 3 | Status: SHIPPED | OUTPATIENT
Start: 2023-04-05 | End: 2023-09-05

## 2023-04-05 RX ORDER — CARVEDILOL 12.5 MG/1
12.5 TABLET ORAL 2 TIMES DAILY
Qty: 180 TABLET | Refills: 3 | Status: ON HOLD | OUTPATIENT
Start: 2023-04-05 | End: 2024-02-16 | Stop reason: HOSPADM

## 2023-04-05 NOTE — PROGRESS NOTES
/Subjective:   Patient ID:  Adriana Strong is a 78 y.o. female who presents for follow up management of Hyperlipidemia, Valvular Heart Disease, mesenteric angina, and Coronary Artery Disease            HPI:         78 year old female with CAD, HLD, HTN. AS s/p TAVR, DM, CVA, CHF, AF and PPM is here for follow-up.  She was in emergency room room July 2022 complaining of chest discomfort.  Her workup was overall unremarkable.  She has a outpatient PET stress ordered that revealed no reversible ischemia and normal ejection fraction.  She recalls having chest tightness during the stress test concerning for angina.  In August 2022 she was admitted for chest discomfort or overall unremarkable workup.  She returned in September 2022 with decompensated heart failure.  She responded well to IV diuretics.  She continues to complain of intermittent chest discomfort on 4 antianginal agents.  She was offered an angiogram in September 2022 however she opted for medical therapy.  She returns today complaining of indigestion.  She is a past medical history of inferior mesenteric revascularization with a 5 mm bare metal stent.  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  She takes Protonix 40 mg daily for gastroesophageal reflux.             [facility] 2019 prior to TAVR:  1.  The LVH made coronary angiography very difficult.  A 10 cc was required for coronary angiograms.  2.  The left main was free of disease.  It was very short making cannulation of the LAD difficulty.  3.  The LAD had a long tubular stenosis of 70% at the diagonal bifurcation.  The diagonal was a larger vessel than the LAD making intervention on the LAD suboptimal.  4.  The left circumflex was normal.  5.  The right coronary artery was normal.        Venous US 9/2022    There is no evidence of a right lower extremity DVT.  The right superficial femoral middle vein is normal.  There is no evidence of a left lower extremity  DVT.  The right greater saphenous vein has reflux.  The left greater saphenous vein has reflux.    PET stress 9/2022     Non ischemic with a normal EF      Echo 10/2022     Normal EF   Normal TAVR   PASP 47 mmHg        LIZ PTAS with BMS 2015 (5 mm stent) - Dr. Joana Hernandez         Patient Active Problem List    Diagnosis Date Noted    Occlusion of superior mesenteric artery 04/05/2023    Acute on chronic heart failure with preserved ejection fraction 09/30/2022    Seizure-like activity     Numbness and tingling 12/13/2021    Aortic atherosclerosis 06/25/2021    History of transcatheter aortic valve replacement (TAVR) 12/08/2020    Coronary artery disease involving native coronary artery of native heart without angina pectoris 12/08/2020    Acute on chronic diastolic (congestive) heart failure 11/12/2020    Chronic atrial fibrillation 11/12/2020    BMI 40.0-44.9, adult 11/12/2020    Localized edema 11/12/2020    CAD (coronary artery disease) 10/30/2020    Asthma in adult 10/30/2020    Other chest pain 10/29/2020    Comfort measures only status 10/21/2020    Acute cystitis without hematuria     Leukocytosis 10/19/2020    Dysuria 10/19/2020    Hyponatremia 09/01/2020    Atrial flutter 05/15/2020    Vasovagal syncope 11/26/2019    TACOS (acute kidney injury) 11/26/2019    Headache 11/26/2019    Morbid obesity 11/20/2019    Chronic diastolic heart failure 11/20/2019    S/P TAVR (transcatheter aortic valve replacement) 11/19/2019    Lumbar radiculopathy 06/25/2019    Decreased range of motion of trunk and back 06/10/2019    Lumbar spondylosis 06/10/2019    Neuroforaminal stenosis of lumbar spine 06/10/2019    Spinal stenosis of lumbar region with neurogenic claudication 06/10/2019    Low back pain 06/06/2019    Difficulty walking 06/06/2019    Muscle weakness 06/06/2019    Balance problems 06/06/2019    DDD (degenerative disc disease), lumbar 05/14/2019    Cataract 05/14/2019    Diabetic polyneuropathy associated with type  2 diabetes mellitus 2019    Costochondritis 2017    COPD (chronic obstructive pulmonary disease) 2017    Umbilical hernia without obstruction and without gangrene 2017    Incisional hernia, without obstruction or gangrene 2017    Aortic stenosis 2017    Constipation 2017    COPD exacerbation 2017    Old lacunar stroke without late effect 2016     On Eliquis and plavix        Persistent atrial fibrillation 2016    Pure hypercholesterolemia 2016    Essential hypertension 2016    Gastroesophageal reflux disease     Mesenteric artery insufficiency 2015    Chronic mesenteric ischemia 2015    Enlarged ovary 2014    Type 2 diabetes mellitus 2014     A1C 7.0        Severe persistent asthma with acute exacerbation 2014    GERD (gastroesophageal reflux disease) 2014           Right Arm BP - Sittin/73  Left Arm BP - Sittin/72        LABS      LAST HbA1c  Lab Results   Component Value Date    HGBA1C 5.8 (H) 2022       Lipid panel  Lab Results   Component Value Date    CHOL 149 2023    CHOL 168 2022    CHOL 143 2021     Lab Results   Component Value Date    HDL 63 2023    HDL 69 2022    HDL 56 2021     Lab Results   Component Value Date    LDLCALC 62.4 (L) 2023    LDLCALC 87.0 2022    LDLCALC 69.4 2021     Lab Results   Component Value Date    TRIG 118 2023    TRIG 60 2022    TRIG 88 2021     Lab Results   Component Value Date    CHOLHDL 42.3 2023    CHOLHDL 41.1 2022    CHOLHDL 39.2 2021            Review of Systems   Constitutional: Negative for diaphoresis, night sweats, weight gain and weight loss.   HENT:  Negative for congestion.    Eyes:  Negative for blurred vision, discharge and double vision.   Cardiovascular:  Positive for chest pain and dyspnea on exertion. Negative for claudication, cyanosis, irregular  heartbeat, leg swelling, near-syncope, orthopnea, palpitations, paroxysmal nocturnal dyspnea and syncope.   Respiratory:  Positive for shortness of breath. Negative for cough and wheezing.    Endocrine: Negative for cold intolerance, heat intolerance and polyphagia.   Hematologic/Lymphatic: Negative for adenopathy and bleeding problem. Does not bruise/bleed easily.   Skin:  Negative for dry skin and nail changes.   Musculoskeletal:  Positive for arthritis. Negative for back pain, falls, joint pain, myalgias and neck pain.   Gastrointestinal:  Negative for bloating, abdominal pain, change in bowel habit and constipation.   Genitourinary:  Negative for bladder incontinence, dysuria, flank pain, genital sores and missed menses.   Neurological:  Negative for aphonia, brief paralysis, difficulty with concentration, dizziness and weakness.   Psychiatric/Behavioral:  Negative for altered mental status and memory loss. The patient does not have insomnia.    Allergic/Immunologic: Negative for environmental allergies.     Objective:   Physical Exam  Constitutional:       Appearance: She is well-developed.      Interventions: She is not intubated.  HENT:      Head: Normocephalic and atraumatic.      Right Ear: External ear normal.      Left Ear: External ear normal.   Eyes:      General: No scleral icterus.        Right eye: No discharge.         Left eye: No discharge.      Conjunctiva/sclera: Conjunctivae normal.      Pupils: Pupils are equal, round, and reactive to light.   Neck:      Thyroid: No thyromegaly.      Vascular: Normal carotid pulses. No carotid bruit, hepatojugular reflux or JVD.      Trachea: No tracheal deviation.   Cardiovascular:      Rate and Rhythm: Normal rate. Rhythm irregularly irregular. No extrasystoles are present.     Chest Wall: PMI is not displaced.      Pulses:           Carotid pulses are 2+ on the right side and 2+ on the left side.       Radial pulses are 2+ on the right side and 2+ on the  left side.        Femoral pulses are 2+ on the right side and 2+ on the left side.       Popliteal pulses are 2+ on the right side and 2+ on the left side.        Dorsalis pedis pulses are 1+ on the right side and 1+ on the left side.        Posterior tibial pulses are 1+ on the right side and 1+ on the left side.      Heart sounds: S1 normal and S2 normal. Heart sounds not distant. No midsystolic click. Murmur heard.   Systolic murmur is present with a grade of 2/6 at the upper right sternal border.     No friction rub. No gallop. No S3 sounds.   Pulmonary:      Effort: Pulmonary effort is normal. No tachypnea, bradypnea, accessory muscle usage or respiratory distress. She is not intubated.      Breath sounds: Normal breath sounds. No stridor. No decreased breath sounds, wheezing or rales.   Chest:      Chest wall: Tenderness present.   Abdominal:      General: There is no distension or abdominal bruit.      Palpations: There is no mass or pulsatile mass.      Tenderness: There is no abdominal tenderness. There is no guarding or rebound.   Musculoskeletal:         General: No tenderness. Normal range of motion.      Cervical back: Normal range of motion and neck supple.   Lymphadenopathy:      Cervical: No cervical adenopathy.   Skin:     General: Skin is warm.      Coloration: Skin is not pale.      Findings: No erythema or rash.   Neurological:      Mental Status: She is alert and oriented to person, place, and time.      Cranial Nerves: No cranial nerve deficit.      Coordination: Coordination normal.      Deep Tendon Reflexes: Reflexes are normal and symmetric.   Psychiatric:         Behavior: Behavior normal.         Thought Content: Thought content normal.         Judgment: Judgment normal.       Assessment:     1. Chronic vascular disorders of intestine    2. Essential hypertension    3. Pure hypercholesterolemia    4. Persistent atrial fibrillation    5. Aortic valve stenosis, etiology of cardiac valve  disease unspecified    6. S/P TAVR (transcatheter aortic valve replacement)    7. Other chest pain    8. Coronary artery disease involving native coronary artery of native heart with angina pectoris with documented spasm    9. Aortic atherosclerosis    10. BMI 40.0-44.9, adult    11. Gastroesophageal reflux disease, unspecified whether esophagitis present    12. Mesenteric artery insufficiency    13. Occlusion of superior mesenteric artery    14. Morbid obesity    15. Chronic diastolic heart failure          Plan:       We had a long discussion with the patient  regarding recurrent chest discomfort with a known history of coronary disease.  Reviewed her angiogram prior to transfemoral aortic valve replacement which revealed diffuse disease in the left anterior descending artery involving the bifurcation diagonal branch.  They are both concerned with technical capability to treat this complex lesion.  At this time the rather continue with adjustment of anti anginal therapy.  If symptoms persists will discuss left heart catheterization with possible intervention. Follow up in 4 weeks after increasing ranexa from 500 to 1000 mg bid. If CP persists she will have a LHC.  She will have a CTA of abdomen and pelvis to assess the patency of the inferior mesenteric stent.  Virtual visit after the tests.  She expressed verbal understanding of the plan.        Weight loss.  Exercise.  Low-salt diet.    Continue with diuretic dose as previously prescribed.    Compression stockings 20-30 mmHg.  Referral to sleep disorder clinic for evaluation and management of RADHA.       Continue with current medical plan and lifestyle changes.  Return sooner for concerns or questions. If symptoms persist go to the ED  I have reviewed all pertinent data on this patient       I have reviewed the patient's medical history in detail and updated the computerized patient record.  Orders Placed This Encounter   Procedures    CTA Abdomen and  Pelvis     Standing Status:   Future     Standing Expiration Date:   4/5/2024     Order Specific Question:   Does this patient have impaired renal function?     Answer:   No     Order Specific Question:   May the Radiologist modify the order per protocol to meet the clinical needs of the patient?     Answer:   Yes     Follow up as scheduled. Return sooner for concerns or questions            She expressed verbal understanding and agreed with the plan        -In today's visit, at least 4 established conditions that pose a risk to life or bodily function have been addressed and the conditions are severe.    -In today's visit, monitoring for drug toxicity was accomplished.      Patient's Medications   New Prescriptions    No medications on file   Previous Medications    ACETAMINOPHEN (TYLENOL) 650 MG TBSR    Take 1,300 mg by mouth 2 (two) times daily as needed.    ALBUTEROL-IPRATROPIUM (DUO-NEB) 2.5 MG-0.5 MG/3 ML NEBULIZER SOLUTION    NEBULIZE 1 VIAL EVERY 4 HOURS AS NEEDED    APIXABAN (ELIQUIS) 5 MG TAB    Take 1 tablet (5 mg total) by mouth 2 (two) times daily.    ASCORBIC ACID, VITAMIN C, (VITAMIN C) 500 MG TABLET    Take 1,000 mg by mouth once daily.     ATORVASTATIN (LIPITOR) 10 MG TABLET    Take 1 tablet (10 mg total) by mouth once daily.    BISACODYL (DULCOLAX) 5 MG EC TABLET    Take 1 tablet (5 mg total) by mouth daily as needed for Constipation.    CALCIUM CARBONATE/VITAMIN D3 (CALTRATE 600 + D ORAL)    Take 1 tablet by mouth once daily.    CYANOCOBALAMIN (VITAMIN B-12) 1000 MCG TABLET    Take 1 tablet (1,000 mcg total) by mouth once daily.    DILTIAZEM (CARDIZEM CD) 240 MG 24 HR CAPSULE    Take 1 capsule (240 mg total) by mouth once daily.    DULOXETINE (CYMBALTA) 20 MG CAPSULE    Take 1 capsule (20 mg total) by mouth once daily.    FEXOFENADINE (ALLEGRA) 60 MG TABLET    Take 3 tablets (180 mg total) by mouth every morning.    FISH OIL-OMEGA-3 FATTY ACIDS 300-1,000 MG CAPSULE    Take 1 g by mouth once daily.      FLUTICASONE PROPIONATE (FLONASE) 50 MCG/ACTUATION NASAL SPRAY    1 spray (50 mcg total) by Each Nostril route every morning.    FUROSEMIDE (LASIX) 40 MG TABLET    Take 1 tablet (40 mg total) by mouth 2 (two) times a day.    GABAPENTIN (NEURONTIN) 300 MG CAPSULE    Take 2 capsules (600 mg total) by mouth 3 (three) times daily.    ISOSORBIDE MONONITRATE (IMDUR) 60 MG 24 HR TABLET    Take 1 tablet (60 mg total) by mouth once daily.    LORAZEPAM (ATIVAN) 0.5 MG TABLET    Take 1 tablet (0.5 mg total) by mouth every morning.    LOSARTAN (COZAAR) 25 MG TABLET    Take 1 tablet (25 mg total) by mouth once daily.    METFORMIN (GLUCOPHAGE) 500 MG TABLET    Take 1 tablet (500 mg total) by mouth 2 (two) times daily.    MONTELUKAST (SINGULAIR) 10 MG TABLET    Take 10 mg by mouth once daily.    MV-MN/FOLIC AC/CALCIUM/VIT K1 (WOMEN'S 50 PLUS MULTIVITAMIN ORAL)    Take 1 tablet by mouth once daily.    NITROGLYCERIN (NITROSTAT) 0.4 MG SL TABLET    Place 1 tablet (0.4 mg total) under the tongue every 5 (five) minutes as needed for Chest pain.    OXYBUTYNIN (DITROPAN-XL) 5 MG TR24    Take 1 tablet (5 mg total) by mouth every other day.    PANTOPRAZOLE (PROTONIX) 40 MG TABLET    Take 1 tablet (40 mg total) by mouth once daily.    POLYSORBATE 80/GLYCERIN (REFRESH DRY EYE THERAPY OPHT)    Apply to eye 2 (two) times daily.    POTASSIUM GLUCONATE 550 MG (90 MG) TAB    Take 180 mg by mouth 2 (two) times a day.    RANOLAZINE (RANEXA) 1,000 MG TB12    Take 1 tablet (1,000 mg total) by mouth 2 (two) times daily.    SYMBICORT 160-4.5 MCG/ACTUATION HFAA    Inhale 1 puff into the lungs 2 (two) times daily.    VITAMIN D (VITAMIN D3) 1000 UNITS TAB    Take 2,000 Units by mouth once daily.   Modified Medications    Modified Medication Previous Medication    CARVEDILOL (COREG) 12.5 MG TABLET carvediloL (COREG) 6.25 MG tablet       Take 1 tablet (12.5 mg total) by mouth 2 (two) times daily.    Take 1 tablet (6.25 mg total) by mouth 2 (two) times  daily.    CLOPIDOGREL (PLAVIX) 75 MG TABLET clopidogreL (PLAVIX) 75 mg tablet       Take 1 tablet (75 mg total) by mouth once daily.    Take 1 tablet (75 mg total) by mouth once daily.   Discontinued Medications    No medications on file

## 2023-04-10 ENCOUNTER — CLINICAL SUPPORT (OUTPATIENT)
Dept: CARDIOLOGY | Facility: CLINIC | Age: 78
End: 2023-04-10
Payer: MEDICARE

## 2023-04-10 DIAGNOSIS — Z95.0 CARDIAC PACEMAKER IN SITU: Primary | ICD-10-CM

## 2023-04-10 PROCEDURE — 93294 PR INTERROG EVAL, REMOTE, UP TO 90 DAYS,PACEMAKER: ICD-10-PCS | Mod: S$GLB,,, | Performed by: INTERNAL MEDICINE

## 2023-04-10 PROCEDURE — 93294 REM INTERROG EVL PM/LDLS PM: CPT | Mod: S$GLB,,, | Performed by: INTERNAL MEDICINE

## 2023-04-11 DIAGNOSIS — M54.12 CERVICAL RADICULOPATHY: Primary | ICD-10-CM

## 2023-04-11 DIAGNOSIS — M54.16 LUMBAR RADICULOPATHY: ICD-10-CM

## 2023-04-13 ENCOUNTER — HOSPITAL ENCOUNTER (OUTPATIENT)
Dept: RADIOLOGY | Facility: HOSPITAL | Age: 78
Discharge: HOME | End: 2023-04-13
Attending: FAMILY MEDICINE
Payer: MEDICARE

## 2023-04-13 DIAGNOSIS — M54.16 LUMBAR RADICULOPATHY: ICD-10-CM

## 2023-04-13 DIAGNOSIS — M54.12 CERVICAL RADICULOPATHY: ICD-10-CM

## 2023-04-13 PROCEDURE — 72040 X-RAY EXAM NECK SPINE 2-3 VW: CPT | Mod: 26,,, | Performed by: RADIOLOGY

## 2023-04-13 PROCEDURE — 72040 X-RAY EXAM NECK SPINE 2-3 VW: CPT | Mod: TC,FY,PO

## 2023-04-13 PROCEDURE — 72100 XR LUMBAR SPINE 2 OR 3 VIEWS: ICD-10-PCS | Mod: 26,,, | Performed by: RADIOLOGY

## 2023-04-13 PROCEDURE — 72100 X-RAY EXAM L-S SPINE 2/3 VWS: CPT | Mod: TC,FY,PO

## 2023-04-13 PROCEDURE — 72100 X-RAY EXAM L-S SPINE 2/3 VWS: CPT | Mod: 26,,, | Performed by: RADIOLOGY

## 2023-04-13 PROCEDURE — 72040 XR CERVICAL SPINE 2 OR 3 VIEWS: ICD-10-PCS | Mod: 26,,, | Performed by: RADIOLOGY

## 2023-04-20 ENCOUNTER — HOSPITAL ENCOUNTER (OUTPATIENT)
Dept: RADIOLOGY | Facility: HOSPITAL | Age: 78
Discharge: HOME | End: 2023-04-20
Attending: INTERNAL MEDICINE
Payer: MEDICARE

## 2023-04-20 ENCOUNTER — PATIENT MESSAGE (OUTPATIENT)
Dept: VASCULAR SURGERY | Facility: CLINIC | Age: 78
End: 2023-04-20
Payer: MEDICARE

## 2023-04-20 DIAGNOSIS — K55.1 CHRONIC VASCULAR DISORDERS OF INTESTINE: ICD-10-CM

## 2023-04-20 DIAGNOSIS — K55.1 CHRONIC MESENTERIC ISCHEMIA: Primary | ICD-10-CM

## 2023-05-01 ENCOUNTER — OFFICE VISIT (OUTPATIENT)
Dept: VASCULAR SURGERY | Facility: CLINIC | Age: 78
End: 2023-05-01
Attending: SURGERY
Payer: MEDICARE

## 2023-05-01 VITALS
DIASTOLIC BLOOD PRESSURE: 67 MMHG | HEART RATE: 69 BPM | BODY MASS INDEX: 42.58 KG/M2 | HEIGHT: 63 IN | SYSTOLIC BLOOD PRESSURE: 167 MMHG | WEIGHT: 240.31 LBS | TEMPERATURE: 98 F

## 2023-05-01 DIAGNOSIS — Z95.820 STATUS POST PERIPHERAL ARTERY ANGIOPLASTY WITH INSERTION OF STENT: Primary | ICD-10-CM

## 2023-05-01 DIAGNOSIS — Z98.890 OTHER SPECIFIED POSTPROCEDURAL STATES: Primary | ICD-10-CM

## 2023-05-01 PROCEDURE — 99212 OFFICE O/P EST SF 10 MIN: CPT | Mod: S$GLB,,, | Performed by: SURGERY

## 2023-05-01 PROCEDURE — 1157F PR ADVANCE CARE PLAN OR EQUIV PRESENT IN MEDICAL RECORD: ICD-10-PCS | Mod: CPTII,S$GLB,, | Performed by: SURGERY

## 2023-05-01 PROCEDURE — 1157F ADVNC CARE PLAN IN RCRD: CPT | Mod: CPTII,S$GLB,, | Performed by: SURGERY

## 2023-05-01 PROCEDURE — 3078F PR MOST RECENT DIASTOLIC BLOOD PRESSURE < 80 MM HG: ICD-10-PCS | Mod: CPTII,S$GLB,, | Performed by: SURGERY

## 2023-05-01 PROCEDURE — 1126F PR PAIN SEVERITY QUANTIFIED, NO PAIN PRESENT: ICD-10-PCS | Mod: CPTII,S$GLB,, | Performed by: SURGERY

## 2023-05-01 PROCEDURE — 3077F SYST BP >= 140 MM HG: CPT | Mod: CPTII,S$GLB,, | Performed by: SURGERY

## 2023-05-01 PROCEDURE — 3077F PR MOST RECENT SYSTOLIC BLOOD PRESSURE >= 140 MM HG: ICD-10-PCS | Mod: CPTII,S$GLB,, | Performed by: SURGERY

## 2023-05-01 PROCEDURE — 99999 PR PBB SHADOW E&M-EST. PATIENT-LVL IV: ICD-10-PCS | Mod: PBBFAC,,, | Performed by: SURGERY

## 2023-05-01 PROCEDURE — 99999 PR PBB SHADOW E&M-EST. PATIENT-LVL IV: CPT | Mod: PBBFAC,,, | Performed by: SURGERY

## 2023-05-01 PROCEDURE — 1159F PR MEDICATION LIST DOCUMENTED IN MEDICAL RECORD: ICD-10-PCS | Mod: CPTII,S$GLB,, | Performed by: SURGERY

## 2023-05-01 PROCEDURE — 1126F AMNT PAIN NOTED NONE PRSNT: CPT | Mod: CPTII,S$GLB,, | Performed by: SURGERY

## 2023-05-01 PROCEDURE — 3288F FALL RISK ASSESSMENT DOCD: CPT | Mod: CPTII,S$GLB,, | Performed by: SURGERY

## 2023-05-01 PROCEDURE — 3078F DIAST BP <80 MM HG: CPT | Mod: CPTII,S$GLB,, | Performed by: SURGERY

## 2023-05-01 PROCEDURE — 1159F MED LIST DOCD IN RCRD: CPT | Mod: CPTII,S$GLB,, | Performed by: SURGERY

## 2023-05-01 PROCEDURE — 99212 PR OFFICE/OUTPT VISIT, EST, LEVL II, 10-19 MIN: ICD-10-PCS | Mod: S$GLB,,, | Performed by: SURGERY

## 2023-05-01 PROCEDURE — 1101F PR PT FALLS ASSESS DOC 0-1 FALLS W/OUT INJ PAST YR: ICD-10-PCS | Mod: CPTII,S$GLB,, | Performed by: SURGERY

## 2023-05-01 PROCEDURE — 1101F PT FALLS ASSESS-DOCD LE1/YR: CPT | Mod: CPTII,S$GLB,, | Performed by: SURGERY

## 2023-05-01 PROCEDURE — 3288F PR FALLS RISK ASSESSMENT DOCUMENTED: ICD-10-PCS | Mod: CPTII,S$GLB,, | Performed by: SURGERY

## 2023-05-01 NOTE — PROGRESS NOTES
"VASCULAR SURGERY NOTE    Patient ID: Adriana Strong is a 78 y.o. female.    I. HISTORY     Chief Complaint: follow up    Interval History:   Adriana Strong is our 78yoF who follows for surveillance of her LIZ stent secondary to chronic mesenteric ischemia. The patient denies any abdominal pain, continues to tolerate a diet, having normal bowel function. She was supposed to undergo CTA but unfortunately she never got it. Given her asymptomatic disease I think we can continue to observe her stent without surveillance imaging. She is ambulating with a cane and rolling walker. No other complaints.    HPI: Adriana Strong is a 78 y.o. female with Afib, CHF, asthma, CAD, HLD, HTN, TAVR in 2019 and GERD who is here today for established patient appointment. She has history of LIZ stenting in 2015 with Dr. Hernandez for chronic mesenteric ischemia. The last time I saw her I wrote the following:    "Since her last appointment, she has developed an aching pain similar to her initial procedure that is on and off for month long stretches first starting 9 months ago. The pain begins subxiphoid and goes down to her umbilicus, then spreads laterally. The pain recently started up in the last week and begins after some meals. Denies food aversion. She endorses nausea, vomiting, and belching after some meals. Laying down and a heating pad makes her pain better. Pain medication improves her pain. She has lost 10 pounds over the past 2 months without trying, but she has also recently started a cardiac diet after being hospitalized."        Past Medical History:   Diagnosis Date    Acute on chronic diastolic (congestive) heart failure 11/20/2019    Anticoagulant long-term use     on Plavix since May 2015    Anxiety     Arthritis     Asthma     Atrial fibrillation     Atrial fibrillation with RVR 10/19/2020    Cataracts, bilateral     Chest pain, atypical     Chronic atrial fibrillation with rapid ventricular response 6/23/2017    Colon " polyp     Coronary artery disease     Diabetes mellitus     Dry eyes     Esophageal erosions     GERD (gastroesophageal reflux disease)     Heart murmur     Hemorrhoid     High cholesterol     Hypertension     Irritable bowel syndrome     Paroxysmal atrial fibrillation 10/30/2020    Persistent atrial fibrillation 2/10/2020    Shingles 2015    Stenosis of aortic and mitral valves     Stroke     TIA (transient ischemic attack)     Use of cane as ambulatory aid         Past Surgical History:   Procedure Laterality Date    ABDOMINAL SURGERY      stents to SMA    angiocele      2007, 2014    AORTIC VALVE REPLACEMENT N/A 11/19/2019    Procedure: Replacement-valve-aortic;  Surgeon: Jack Capone MD;  Location: [facility] CATH LAB;  Service: Cardiology;  Laterality: N/A;    BREAST SURGERY      left--- a lump--- no cancer    CARDIAC VALVE SURGERY      COLONOSCOPY  2014    COLONOSCOPY N/A 3/14/2018    Procedure: COLONOSCOPY;  Surgeon: Efren Kemp MD;  [facility]  Service: Endoscopy;  Laterality: N/A;  ok to hold Plavix 5 days prior to procedure per Dr RESHMA Hernandez     ok per Dr Kemp to hold Eliquis 2 days prior to procedure (see telephone encounter dated-2/7/18)    HERNIA REPAIR      HYSTERECTOMY  partial    1982 partial hysterectomy    LEFT HEART CATHETERIZATION Right 11/5/2019    Procedure: Left heart cath;  Surgeon: Jack Capone MD;  Location: [facility] CATH LAB;  Service: Cardiology;  Laterality: Right;    left nasal polyp      left neck lymph node      nose polyp      right hip fatty mass tissue      stent to small intestine      SUPERIOR VENA CAVA ANGIOPLASTY / STENTING      TONSILLECTOMY      TOTAL ABDOMINAL HYSTERECTOMY      2014    TOTAL KNEE ARTHROPLASTY Bilateral     TREATMENT OF CARDIAC ARRHYTHMIA N/A 2/10/2020    Procedure: CARDIOVERSION;  Surgeon: Jayy Parrish MD;  Location: [facility] EP LAB;  Service: Cardiology;  Laterality: N/A;  AF, PEDRO, DCCV, MAC, DM, 3 Prep    TREATMENT OF CARDIAC ARRHYTHMIA N/A  5/15/2020    Procedure: CARDIOVERSION;  Surgeon: Hany Bee MD;  Location: [facility] LAB;  Service: Cardiology;  Laterality: N/A;  AF, PEDRO, DCCV, MAC, DM, 3 Prep    UPPER GASTROINTESTINAL ENDOSCOPY  2014     Social History     Occupational History    Not on file   Tobacco Use    Smoking status: Never    Smokeless tobacco: Never   Substance and Sexual Activity    Alcohol use: No    Drug use: No    Sexual activity: Not Currently     Review of Systems   Constitutional: Negative for weight loss.   HENT:  Negative for ear pain and nosebleeds.    Eyes:  Negative for discharge and pain.   Cardiovascular:  Negative for chest pain and palpitations.   Respiratory:  Negative for cough, shortness of breath and wheezing.    Endocrine: Negative for cold intolerance, heat intolerance and polyphagia.   Hematologic/Lymphatic: Negative for adenopathy. Does not bruise/bleed easily.   Skin:  Negative for itching and rash.   Musculoskeletal:  Negative for joint swelling and muscle cramps.   Gastrointestinal:  Negative for abdominal pain, diarrhea, nausea and vomiting.   Genitourinary:  Negative for dysuria and flank pain.   Neurological:  Negative for numbness and seizures.         II. PHYSICAL EXAM    Physical Exam  Vitals and nursing note reviewed.   Constitutional:       General: She is not in acute distress.     Appearance: She is obese.   HENT:      Head: Normocephalic and atraumatic.      Nose: Nose normal.   Cardiovascular:      Rate and Rhythm: Normal rate.   Pulmonary:      Effort: Pulmonary effort is normal. No respiratory distress.   Abdominal:      General: Abdomen is flat. There is no distension.      Palpations: Abdomen is soft.      Tenderness: There is no abdominal tenderness.   Musculoskeletal:         General: Normal range of motion.   Skin:     General: Skin is warm and dry.   Neurological:      Mental Status: She is alert.           III. ASSESSMENT & PLAN (MEDICAL DECISION MAKING)     1. Status post peripheral  artery angioplasty with insertion of stent        Imaging Results:  Mesenteric Duplex 1/13/20:  Elevated velocity in celiac artery with evidence of stenosis. Patent LIZ has chronically elevated proximal velocity which is stable from last exam. Suspect that this is is secondary to the stent and does not represent any hemodynamically significant stenosis.    Mesenteric Duplex 11/9/20:  Poor visualization due to bowel gas.    CTA ABD/PELVIS 11/30/20: Celiac artery origin appears stenotic but motion artifact limits visualization of the proximal celiac trunk. The LIZ stent is widely patent. The SMA is chronically occluded. Compared to her last CTA in 10/22/2019, findings are unchanged.    Assessment/Diagnosis and Plan:  78 y.o. female with h/o SMA occlusion and LIZ stenting. Ultrasound is inconclusive due to patient body habitus and bowel gas. Her LIZ stent was widely patent on her last imaging and she denies any abdominal symptoms.     -Continue home eliquis   -Continue plavix for stent patency   -Recommend increasing to high intensity statin (atorvastatin 40mg or rosuvastatin 20mg)   -Plan CTA abd/pelvis for surveillance of IMCitlaly    GEdie Cohen II, MD, RPVI  Vascular Surgeon  [facility]

## 2023-05-09 DIAGNOSIS — I48.19 PERSISTENT ATRIAL FIBRILLATION: ICD-10-CM

## 2023-05-09 RX ORDER — APIXABAN 5 MG/1
TABLET, FILM COATED ORAL
Qty: 180 TABLET | Refills: 3 | Status: SHIPPED | OUTPATIENT
Start: 2023-05-09

## 2023-05-10 ENCOUNTER — HOSPITAL ENCOUNTER (OUTPATIENT)
Dept: RADIOLOGY | Facility: HOSPITAL | Age: 78
Discharge: HOME | End: 2023-05-10
Attending: SURGERY
Payer: MEDICARE

## 2023-05-10 DIAGNOSIS — Z98.890 OTHER SPECIFIED POSTPROCEDURAL STATES: ICD-10-CM

## 2023-05-10 PROCEDURE — 25500020 PHARM REV CODE 255: Performed by: SURGERY

## 2023-05-10 PROCEDURE — 74174 CTA ABD&PLVS W/CONTRAST: CPT | Mod: TC

## 2023-05-10 PROCEDURE — 74174 CTA ABD&PLVS W/CONTRAST: CPT | Mod: 26,,, | Performed by: RADIOLOGY

## 2023-05-10 PROCEDURE — 74174 CTA ABDOMEN AND PELVIS: ICD-10-PCS | Mod: 26,,, | Performed by: RADIOLOGY

## 2023-05-10 RX ADMIN — IOHEXOL 100 ML: 350 INJECTION, SOLUTION INTRAVENOUS at 09:05

## 2023-05-23 ENCOUNTER — HOSPITAL ENCOUNTER (EMERGENCY)
Facility: HOSPITAL | Age: 78
Discharge: HOME | End: 2023-05-23
Attending: EMERGENCY MEDICINE
Payer: MEDICARE

## 2023-05-23 VITALS
HEART RATE: 70 BPM | DIASTOLIC BLOOD PRESSURE: 61 MMHG | OXYGEN SATURATION: 96 % | WEIGHT: 240 LBS | SYSTOLIC BLOOD PRESSURE: 136 MMHG | BODY MASS INDEX: 42.51 KG/M2 | TEMPERATURE: 98 F | RESPIRATION RATE: 20 BRPM

## 2023-05-23 DIAGNOSIS — L03.116 CELLULITIS OF LEFT LOWER EXTREMITY: Primary | ICD-10-CM

## 2023-05-23 LAB
ALBUMIN SERPL BCP-MCNC: 3.6 G/DL (ref 3.5–5.2)
ALP SERPL-CCNC: 33 U/L (ref 55–135)
ALT SERPL W/O P-5'-P-CCNC: 17 U/L (ref 10–44)
ANION GAP SERPL CALC-SCNC: 9 MMOL/L (ref 8–16)
AST SERPL-CCNC: 17 U/L (ref 10–40)
BASOPHILS # BLD AUTO: 0.05 K/UL (ref 0–0.2)
BASOPHILS NFR BLD: 0.6 % (ref 0–1.9)
BILIRUB SERPL-MCNC: 0.3 MG/DL (ref 0.1–1)
BUN SERPL-MCNC: 22 MG/DL (ref 8–23)
CALCIUM SERPL-MCNC: 9.4 MG/DL (ref 8.7–10.5)
CHLORIDE SERPL-SCNC: 97 MMOL/L (ref 95–110)
CO2 SERPL-SCNC: 26 MMOL/L (ref 23–29)
CREAT SERPL-MCNC: 0.9 MG/DL (ref 0.5–1.4)
DIFFERENTIAL METHOD: ABNORMAL
EOSINOPHIL # BLD AUTO: 0 K/UL (ref 0–0.5)
EOSINOPHIL NFR BLD: 0.1 % (ref 0–8)
ERYTHROCYTE [DISTWIDTH] IN BLOOD BY AUTOMATED COUNT: 13.7 % (ref 11.5–14.5)
EST. GFR  (NO RACE VARIABLE): >60 ML/MIN/1.73 M^2
GLUCOSE SERPL-MCNC: 94 MG/DL (ref 70–110)
HCT VFR BLD AUTO: 37 % (ref 37–48.5)
HGB BLD-MCNC: 12.1 G/DL (ref 12–16)
IMM GRANULOCYTES # BLD AUTO: 0.04 K/UL (ref 0–0.04)
IMM GRANULOCYTES NFR BLD AUTO: 0.5 % (ref 0–0.5)
LYMPHOCYTES # BLD AUTO: 1.7 K/UL (ref 1–4.8)
LYMPHOCYTES NFR BLD: 21.4 % (ref 18–48)
MCH RBC QN AUTO: 30.9 PG (ref 27–31)
MCHC RBC AUTO-ENTMCNC: 32.7 G/DL (ref 32–36)
MCV RBC AUTO: 95 FL (ref 82–98)
MONOCYTES # BLD AUTO: 0.9 K/UL (ref 0.3–1)
MONOCYTES NFR BLD: 11.1 % (ref 4–15)
NEUTROPHILS # BLD AUTO: 5.3 K/UL (ref 1.8–7.7)
NEUTROPHILS NFR BLD: 66.3 % (ref 38–73)
NRBC BLD-RTO: 0 /100 WBC
PLATELET # BLD AUTO: 254 K/UL (ref 150–450)
PMV BLD AUTO: 9.9 FL (ref 9.2–12.9)
POTASSIUM SERPL-SCNC: 4.8 MMOL/L (ref 3.5–5.1)
PROT SERPL-MCNC: 6.7 G/DL (ref 6–8.4)
RBC # BLD AUTO: 3.91 M/UL (ref 4–5.4)
SODIUM SERPL-SCNC: 132 MMOL/L (ref 136–145)
WBC # BLD AUTO: 8 K/UL (ref 3.9–12.7)

## 2023-05-23 PROCEDURE — 99283 EMERGENCY DEPT VISIT LOW MDM: CPT

## 2023-05-23 PROCEDURE — 80053 COMPREHEN METABOLIC PANEL: CPT | Performed by: EMERGENCY MEDICINE

## 2023-05-23 PROCEDURE — 85025 COMPLETE CBC W/AUTO DIFF WBC: CPT | Performed by: EMERGENCY MEDICINE

## 2023-05-23 RX ORDER — CLINDAMYCIN HYDROCHLORIDE 150 MG/1
450 CAPSULE ORAL EVERY 8 HOURS
Qty: 90 CAPSULE | Refills: 0 | Status: SHIPPED | OUTPATIENT
Start: 2023-05-23 | End: 2023-06-02

## 2023-05-23 NOTE — ED PROVIDER NOTES
Encounter Date: 5/23/2023       History     Chief Complaint   Patient presents with    Wound Infection     Wound to LLE x7 days. Pt. Was seen at urgent care 4 days ago and prescribed Doxycycline and Bactroban without improvement.      Patient presents with a left lower extremity wound that has been going on for the past seven days.  She was seen in the urgent care 4 days ago placed on doxycycline, also prescribed Bactroban without improvement.  She denies any fevers/chills.  She denies any nausea/vomiting.  States he has been able to handle all medications.    Review of patient's allergies indicates:   Allergen Reactions    Celebrex [celecoxib] Shortness Of Breath    Ciprofloxacin Swelling     lip    Dicyclomine Hives    Adhesive Dermatitis    Avelox [moxifloxacin] Swelling    Cilostazol Other (See Comments)     Elevates blood pressure    Eryc [erythromycin] Other (See Comments)     unknown    Iodine and iodide containing products Hives    Keflex [cephalexin] Hives    Meclomen      rashes    Meloxicam      Ear ringing    Metoclopramide Other (See Comments)     High blood pressure    Sulfa (sulfonamide antibiotics) Itching     Past Medical History:   Diagnosis Date    Acute on chronic diastolic (congestive) heart failure 11/20/2019    Anticoagulant long-term use     on Plavix since May 2015    Anxiety     Arthritis     Asthma     Atrial fibrillation     Atrial fibrillation with RVR 10/19/2020    Cataracts, bilateral     Chest pain, atypical     Chronic atrial fibrillation with rapid ventricular response 6/23/2017    Colon polyp     Coronary artery disease     Diabetes mellitus     Dry eyes     Esophageal erosions     GERD (gastroesophageal reflux disease)     Heart murmur     Hemorrhoid     High cholesterol     Hypertension     Irritable bowel syndrome     Paroxysmal atrial fibrillation 10/30/2020    Persistent atrial fibrillation 2/10/2020    Shingles 2015    Stenosis of aortic and mitral valves     Stroke     TIA  (transient ischemic attack)     Use of cane as ambulatory aid      Past Surgical History:   Procedure Laterality Date    ABDOMINAL SURGERY      stents to SMA    angiocele      2007, 2014    AORTIC VALVE REPLACEMENT N/A 11/19/2019    Procedure: Replacement-valve-aortic;  Surgeon: Jack Capone MD;  Location: [facility] CATH LAB;  Service: Cardiology;  Laterality: N/A;    BREAST SURGERY      left--- a lump--- no cancer    CARDIAC VALVE SURGERY      COLONOSCOPY  2014    COLONOSCOPY N/A 3/14/2018    Procedure: COLONOSCOPY;  Surgeon: Efren Kemp MD;  Location: [facility] ENDO (4TH FLR);  Service: Endoscopy;  Laterality: N/A;  ok to hold Plavix 5 days prior to procedure per Dr RESHMA Hernandez     ok per Dr Kemp to hold Eliquis 2 days prior to procedure (see telephone encounter dated-2/7/18)    HERNIA REPAIR      HYSTERECTOMY  partial    1982 partial hysterectomy    LEFT HEART CATHETERIZATION Right 11/5/2019    Procedure: Left heart cath;  Surgeon: Jack Capone MD;  Location: [facility] CATH LAB;  Service: Cardiology;  Laterality: Right;    left nasal polyp      left neck lymph node      nose polyp      right hip fatty mass tissue      stent to small intestine      SUPERIOR VENA CAVA ANGIOPLASTY / STENTING      TONSILLECTOMY      TOTAL ABDOMINAL HYSTERECTOMY      2014    TOTAL KNEE ARTHROPLASTY Bilateral     TREATMENT OF CARDIAC ARRHYTHMIA N/A 2/10/2020    Procedure: CARDIOVERSION;  Surgeon: Jayy Parrish MD;  Location: [facility] EP LAB;  Service: Cardiology;  Laterality: N/A;  AF, PEDRO, DCCV, MAC, DM, 3 Prep    TREATMENT OF CARDIAC ARRHYTHMIA N/A 5/15/2020    Procedure: CARDIOVERSION;  Surgeon: Hany Bee MD;  Location: [facility] EP LAB;  Service: Cardiology;  Laterality: N/A;  AF, PEDRO, DCCV, MAC, DM, 3 Prep    UPPER GASTROINTESTINAL ENDOSCOPY  2014     Family History   Problem Relation Age of Onset    Heart attack Mother     Stroke Father     Heart failure Brother     Colon cancer Neg Hx     Inflammatory bowel disease Neg Hx      Social  History     Tobacco Use    Smoking status: Never    Smokeless tobacco: Never   Substance Use Topics    Alcohol use: No    Drug use: No     Review of Systems   Skin:  Positive for wound.     Physical Exam     Initial Vitals [05/23/23 0846]   BP Pulse Resp Temp SpO2   136/61 70 20 98.1 °F (36.7 °C) 96 %      MAP       --         Physical Exam    Vitals reviewed.  Cardiovascular:  Normal rate and regular rhythm.           No murmur heard.  Pulmonary/Chest: Breath sounds normal. She has no wheezes.   Abdominal: Abdomen is soft. There is no abdominal tenderness.   Musculoskeletal:         General: Normal range of motion.     Neurological: She is alert and oriented to person, place, and time.   Skin: Skin is warm and dry.   Area of wound with healing scab with surrounding erythema.  No fluctuation and no pustular discharge.  No lymphangitis.   Psychiatric: She has a normal mood and affect.       ED Course   Procedures  Labs Reviewed   CBC W/ AUTO DIFFERENTIAL - Abnormal; Notable for the following components:       Result Value    RBC 3.91 (*)     All other components within normal limits   COMPREHENSIVE METABOLIC PANEL - Abnormal; Notable for the following components:    Sodium 132 (*)     Alkaline Phosphatase 33 (*)     All other components within normal limits          Imaging Results    None          Medications - No data to display  Medical Decision Making:   ED Management:  The patient is afebrile and hemodynamically stable.  She has been able to tolerate all medications.  Her wound appears to be healing appropriately with good granulation tissue and scab formation.  There may be a small component of infection to this area.  It shows no signs of spread.  I will add clindamycin to the patient's regimen given her allergy to Keflex.  I instructed her to continue her doxycycline as prescribed.  I will put in an ambulatory referral to the Wound Clinic given her past medical history and instructed for her to call her  primary care provider Dr Mcgraw to schedule a wound check.  Instructed patient to return immediately for any new or worsening symptoms and both patient and  verbalized understanding.           ED Course as of 05/24/23 1029   Tue May 23, 2023   0951 CBC auto differential(!)  Reviewed, nonspecific [CD]   1008 Comprehensive metabolic panel(!)  Reviewed, nonspecific [CD]      ED Course User Index  [CD] Sunny Brooks DO                 Clinical Impression:   Final diagnoses:  [L03.116] Cellulitis of left lower extremity (Primary)        ED Disposition Condition    Discharge Stable          ED Prescriptions       Medication Sig Dispense Start Date End Date Auth. Provider    clindamycin (CLEOCIN) 150 MG capsule Take 3 capsules (450 mg total) by mouth every 8 (eight) hours. for 10 days 90 capsule 5/23/2023 6/2/2023 Sunny Brooks DO          Follow-up Information       Follow up With Specialties Details Why Contact Info    Krzysztof Mcgraw MD Family Medicine Schedule an appointment as soon as possible for a visit   [address] [facility]  [address]   [place] [address]  [phone]               Sunny Brooks DO  05/24/23 1036

## 2023-05-23 NOTE — DISCHARGE INSTRUCTIONS
1.) Call Dr Mcgraw's office today and schedule a 48-72 hour wound recheck.  2.) A referral to the wound care clinic has been placed for you.  3.) Take new antibiotics as prescribed.  4.) Continue doxycycline as prescribed.

## 2023-05-24 ENCOUNTER — TELEPHONE (OUTPATIENT)
Dept: WOUND CARE | Facility: HOSPITAL | Age: 78
End: 2023-05-24
Payer: MEDICARE

## 2023-05-24 NOTE — TELEPHONE ENCOUNTER
Left voicemail for patient to return call for assistance with scheduling a wound care appointment.

## 2023-05-26 RX ORDER — DILTIAZEM HYDROCHLORIDE 240 MG/1
CAPSULE, COATED, EXTENDED RELEASE ORAL
Qty: 90 CAPSULE | Refills: 3 | Status: ON HOLD | OUTPATIENT
Start: 2023-05-26 | End: 2024-02-16 | Stop reason: HOSPADM

## 2023-05-31 ENCOUNTER — TELEPHONE (OUTPATIENT)
Dept: CARDIOLOGY | Facility: CLINIC | Age: 78
End: 2023-05-31
Payer: MEDICARE

## 2023-05-31 NOTE — TELEPHONE ENCOUNTER
----- Message from Fabiola Mccracken sent at 5/31/2023  3:29 PM CDT -----  Pt Requesting Call Back    Who called: Lydia of [employer]  Who called for pt:  Best call back #:   Add notes: says pt seen a doctor that Dr. Harden referred her to; CTA that he ordered came back; says Dr. Hernandez lefts notes; requests to speak with nurse about this

## 2023-06-06 ENCOUNTER — PATIENT MESSAGE (OUTPATIENT)
Dept: CARDIOLOGY | Facility: CLINIC | Age: 78
End: 2023-06-06
Payer: MEDICARE

## 2023-06-12 ENCOUNTER — HOSPITAL ENCOUNTER (EMERGENCY)
Facility: HOSPITAL | Age: 78
Discharge: HOME | End: 2023-06-12
Attending: EMERGENCY MEDICINE
Payer: MEDICARE

## 2023-06-12 VITALS
TEMPERATURE: 98 F | RESPIRATION RATE: 18 BRPM | DIASTOLIC BLOOD PRESSURE: 60 MMHG | OXYGEN SATURATION: 99 % | WEIGHT: 240 LBS | SYSTOLIC BLOOD PRESSURE: 140 MMHG | HEART RATE: 81 BPM | BODY MASS INDEX: 42.51 KG/M2
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
## 2023-06-12 DIAGNOSIS — S09.90XA INJURY OF HEAD, INITIAL ENCOUNTER: Primary | ICD-10-CM

## 2023-06-12 PROCEDURE — 99284 EMERGENCY DEPT VISIT MOD MDM: CPT | Mod: 25

## 2023-06-12 NOTE — FIRST PROVIDER EVALUATION
Emergency Department TeleTriage Encounter Note      CHIEF COMPLAINT    Chief Complaint   Patient presents with    Fall     Pt was walking in her home around 12 and had a fall onto her left side. Pt denies LOC. Pt did hit the left side of her head. Skin intact +hematoma. Pt also has a bruise to the left wrist. Pt also reports soreness all over. Pt does take blood thinners. Pt needed assistance from her  to get up.        VITAL SIGNS   Initial Vitals [06/12/23 1317]   BP Pulse Resp Temp SpO2   (!) 144/64 70 18 97.5 °F (36.4 °C) 96 %      MAP       --            ALLERGIES    Review of patient's allergies indicates:   Allergen Reactions    Celebrex [celecoxib] Shortness Of Breath    Ciprofloxacin Swelling     lip    Dicyclomine Hives    Adhesive Dermatitis    Avelox [moxifloxacin] Swelling    Cilostazol Other (See Comments)     Elevates blood pressure    Eryc [erythromycin] Other (See Comments)     unknown    Iodine and iodide containing products Hives    Keflex [cephalexin] Hives    Meclomen      rashes    Meloxicam      Ear ringing    Metoclopramide Other (See Comments)     High blood pressure    Sulfa (sulfonamide antibiotics) Itching       PROVIDER TRIAGE NOTE  Patient presents with complaint of pain to head after fall about 2 hours PTA. Reports no LOC. Denied neck pain. No N/V/visual changes/dizziness. On blood thinners.      Phy:   Constitutional: well nourished, well developed, appearing stated age, NAD   HEENT: NCAT, symmetrical lids, No obvious facial deformity.  Normal phonation. Normal Conjunctiva   Neck: NAROM   Respiratory: Normal effort.  No obvious use of accessory muscles   Musculoskeletal: Moved upper extremities well   Neuro: Alert, answers questions appropriately    Psych: appropriate mood and affect      Initial orders will be placed and care will be transferred to an alternate provider when patient is roomed for a full evaluation. Any additional orders and the final disposition will be  determined by that provider.        ORDERS  Labs Reviewed - No data to display    ED Orders (720h ago, onward)      Start Ordered     Status Ordering Provider    06/12/23 1404 06/12/23 1403  CT Head Without Contrast  1 time imaging         Ordered ARTURO ALSTON              Virtual Visit Note: The provider triage portion of this emergency department evaluation and documentation was performed via [employer], a HIPAA-compliant telemedicine application, in concert with a tele-presenter in the room. A face to face patient evaluation with one of my colleagues will occur once the patient is placed in an emergency department room.      DISCLAIMER: This note was prepared with [employer] voice recognition transcription software. Garbled syntax, mangled pronouns, and other bizarre constructions may be attributed to that software system.     normal...

## 2023-06-12 NOTE — ED TRIAGE NOTES
Pt arrived with c/o mechanical fall today.  Pt states she fell onto L side and hit head.  Pt denies any LOC.  Pt states she does take blood thinners.  +lightheadedness.  Denies any visual changes, CP, or SOB at present.  Pt currently c/o pain to L head and bruises to bilateral arms and legs.  Pt also c/o pain to buttocks.  Pt answering questions appropriately, speaking in complete sentences, respirations even and unlabored.  Aao x 4.

## 2023-07-03 PROBLEM — Z95.0 CARDIAC PACEMAKER IN SITU: Status: ACTIVE | Noted: 2023-07-03

## 2023-07-04 NOTE — PROGRESS NOTES
Pacemaker interrogation       4/10/2023        Well functioning device      100% Afib burden  Programmed 60/130          Battery 6 yr 4 months      Full reviewed  Full report scanned in media                  Otezla Counseling: The side effects of Otezla were discussed with the patient, including but not limited to worsening or new depression, weight loss, diarrhea, nausea, upper respiratory tract infection, and headache. Patient instructed to call the office should any adverse effect occur.  The patient verbalized understanding of the proper use and possible adverse effects of Otezla.  All the patient's questions and concerns were addressed.

## 2023-07-04 NOTE — PROGRESS NOTES
Pacemaker interrogation       12/21/2022        Well functioning device      100% Afib burden  Programmed 60/130          Battery 6 yr 4 months      Full reviewed  Full report scanned in media

## 2023-07-04 NOTE — PROGRESS NOTES
Pacemaker interrogation       1/3/2023        Well functioning device      100% Afib burden  Programmed 60/130          Battery 6 yr 1 months      Full reviewed  Full report scanned in media

## 2023-07-05 ENCOUNTER — TELEPHONE (OUTPATIENT)
Dept: CARDIOLOGY | Facility: CLINIC | Age: 78
End: 2023-07-05
Payer: MEDICARE

## 2023-07-05 NOTE — TELEPHONE ENCOUNTER
I left her detailed message to call and confirm New appointment with  for 8-23-23 at  Am  Only.    Nw                        ----- Message from Felipa Dickey sent at 7/5/2023  3:40 PM CDT -----  Regarding: pt return call  Patient Returning Call Who Called: ÁNGEL ARCE [[id number]]      Who Left Message for Patient:       Does the patient know what this is regarding? [facility] advise.       Best Call Back Number: [phone]        Additional Information

## 2023-07-10 DIAGNOSIS — M51.36 DEGENERATION OF LUMBAR INTERVERTEBRAL DISC: ICD-10-CM

## 2023-07-10 DIAGNOSIS — M47.816 SPONDYLOSIS WITHOUT MYELOPATHY OR RADICULOPATHY, LUMBAR REGION: ICD-10-CM

## 2023-07-10 DIAGNOSIS — M50.30 DEGENERATION OF CERVICAL INTERVERTEBRAL DISC: ICD-10-CM

## 2023-07-10 DIAGNOSIS — M47.896 OTHER SPONDYLOSIS, LUMBAR REGION: Primary | ICD-10-CM

## 2023-07-10 DIAGNOSIS — M47.22 CERVICAL SPONDYLOSIS WITH RADICULOPATHY: ICD-10-CM

## 2023-07-10 DIAGNOSIS — M47.812 CERVICAL SPONDYLOSIS WITHOUT MYELOPATHY: ICD-10-CM

## 2023-07-10 DIAGNOSIS — M47.892 OTHER SPONDYLOSIS, CERVICAL REGION: Primary | ICD-10-CM

## 2023-07-10 DIAGNOSIS — M47.26 OTHER SPONDYLOSIS WITH RADICULOPATHY, LUMBAR REGION: ICD-10-CM

## 2023-07-26 ENCOUNTER — PATIENT MESSAGE (OUTPATIENT)
Dept: CARDIOLOGY | Facility: CLINIC | Age: 78
End: 2023-07-26
Payer: MEDICARE

## 2023-07-27 ENCOUNTER — PATIENT MESSAGE (OUTPATIENT)
Dept: CARDIOLOGY | Facility: CLINIC | Age: 78
End: 2023-07-27
Payer: MEDICARE

## 2023-07-27 ENCOUNTER — DOCUMENTATION ONLY (OUTPATIENT)
Dept: CARDIOLOGY | Facility: HOSPITAL | Age: 78
End: 2023-07-27
Payer: MEDICARE

## 2023-07-27 NOTE — PROGRESS NOTES
Received a call/message from Jeannette in the MRI Department in relation to this patient needing to be scheduled for a MRI and has a [employer] Pacemaker.  Patient's Device is MRI compatible/conditional.  To meet protocol the Pacemaker/ICD must have been implanted no less than 6 weeks prior to scheduled date of Scan.  ICD/PPM and leads must be from same .  MRI informed ordering MD must input a Cardiac Device Check in clinic and hospital order, patient must have an xray within 6 months or less prior to MRI reprogramming.  Chest xray to be reviewed by Radiologist.       MRI is scheduled on 07/31/2023 @ 10 AM and 10:30 AM.  [employer] Rep has agreed to be available for the MRI's.  Patient is not followed here.  Please call [phone] for parameters.    [employer] [address] 8 -T  Serial# [id number]   Solia S 45  Serial# [id number]   Solia S 53  Serial# [id number]   Implant date 1-

## 2023-07-28 ENCOUNTER — TELEPHONE (OUTPATIENT)
Dept: CARDIOLOGY | Facility: CLINIC | Age: 78
End: 2023-07-28
Payer: MEDICARE

## 2023-07-28 ENCOUNTER — PATIENT MESSAGE (OUTPATIENT)
Dept: CARDIOLOGY | Facility: CLINIC | Age: 78
End: 2023-07-28
Payer: MEDICARE

## 2023-07-28 NOTE — TELEPHONE ENCOUNTER
Called and spoke to Daughter (dayanna) .    I will get  to put in orders of Pacemaker Ck    & Pa + Lateral  Xray.    I will call daughter when orders are in.      Nw                        ----- Message from Jose Yip sent at 7/28/2023 11:56 AM CDT -----  Contact: pt  Type: Requesting to speak with nurse        Who Called: PT  Regarding: Urgent Orders needed for Chest X-ray PA and Lederal and Cardio Device Check   Would the patient rather a call back or a response via [facility]? Call back  Best Call Back Number: [phone]  Additional Information: fax orders to  [phone] kaseyn francia

## 2023-07-29 ENCOUNTER — TELEPHONE (OUTPATIENT)
Dept: CARDIOLOGY | Facility: HOSPITAL | Age: 78
End: 2023-07-29
Payer: MEDICARE

## 2023-07-29 DIAGNOSIS — Z01.818 PRE-OP EVALUATION: Primary | ICD-10-CM

## 2023-08-01 ENCOUNTER — PATIENT MESSAGE (OUTPATIENT)
Dept: CARDIOLOGY | Facility: CLINIC | Age: 78
End: 2023-08-01
Payer: MEDICARE

## 2023-08-02 ENCOUNTER — TELEPHONE (OUTPATIENT)
Dept: CARDIOLOGY | Facility: CLINIC | Age: 78
End: 2023-08-02
Payer: MEDICARE

## 2023-08-02 NOTE — TELEPHONE ENCOUNTER
MD Jeannette Solorzano, RT; P Kodi CHANDLER Staff  Thanks       Can you make all these orders verbal orders and I can sign them       You can turn off the device for MRI       ZN           Previous Messages       ----- Message -----   From: RT Kyler   Sent: 7/26/2023   6:06 PM CDT   To: Rancho Mariee MD; Kodi CHANDLER Staff   Subject: Need cardiac device check in clinic+hospital*       Good afternoon       I am writing in regards to our patient Adriana Strong and her daughter wants for her   To have an MRI instead of CT   She does have a pacemaker so she is scheduled for her MRI at [facility].   On July 31 at 10:00 a.m.   We need some assistance and help with an order for chest pa and lateral so our radiologist   Can check the leads are in placed.   Also will need an order for Cardiac device check in Clinic+hospital for the rep to able to turn off   Device prior the MRI.   Can you assist us with these orders so we can go forward with her MRI Appt on Monday 31st.   Thank you in advance     Best regards   Jeannette/Tyrel   [phone]

## 2023-08-07 DIAGNOSIS — Z95.0 CARDIAC PACEMAKER IN SITU: ICD-10-CM

## 2023-08-07 DIAGNOSIS — M47.816 LUMBAR SPONDYLOSIS: Primary | ICD-10-CM

## 2023-08-10 ENCOUNTER — TELEPHONE (OUTPATIENT)
Dept: CARDIOLOGY | Facility: CLINIC | Age: 78
End: 2023-08-10
Payer: MEDICARE

## 2023-08-10 NOTE — TELEPHONE ENCOUNTER
This message was Already Handled by my Lead     Nurse Esthela.    Thank you Esthela!!      Nw                                    ----- Message from Rancho Mariee MD sent at 8/5/2023  5:26 AM CDT -----  Regarding: RE: pt scheduled for MRI w/Pacemaker on 7/31/2023 need assistance with some order for protocol  Thanks      Melvin Neff and Ignacia  Can you please find out how to write an order to get a rep to turn off a ppm for a MRI. May be call the rep of the company and put them in touch with [facility] [place]       I have never done it      Thanks in advance       BETTY    ----- Message -----  From: Aishwarya Cordoba MA  Sent: 8/2/2023   4:24 PM CDT  To: Rancho Mariee MD  Subject: FW: pt scheduled for MRI w/Pacemaker on 7/31#    Dear Jeannette Andrade needs an Order for Pacemaker.    I have never put one in.      Nw  ----- Message -----  From: RT Kyler  Sent: 8/2/2023  12:18 PM CDT  To: Aishwarya Cordoba MA  Subject: RE: pt scheduled for MRI w/Pacemaker on 7/31#    Jhon Neff  I was off yesterday we have the chest xray order   But I don't see the cardiac device check in clinic+hosp () order  This order will help the rep to turn off device prior MRI  Please advise  Thank you  ----- Message -----  From: Aishwarya Cordoba MA  Sent: 7/31/2023  12:47 PM CDT  To: RT Kyler  Subject: RE: pt scheduled for MRI w/Pacemaker on 7/31#    Did yall get what all you needed on mrs. Strong ?      Nw        ----- Message -----  From: RT Kyler  Sent: 7/28/2023   1:36 PM CDT  To: Aishwarya Cordoba MA; Kodi CHANDLER Staff  Subject: pt scheduled for MRI w/Pacemaker on 7/31/202#      Good Afternoon  I am writing in regards for patient Adriana Strong she is scheduled for her MRI on 7/31/2023  Everything is coordinated with Arrhythmia with the Rep to come to turn off her device.    But needs some additional order a chest pa and latera xray and an order for Cardiac device  Check in clinic+hospital Order code#() need these  orders prior MRI so we can go  Forward with her MRI Appt. I did sent an in basket to Dr Vickers as well last week.  No response and her ordering Doctor told her that the cardiologist needs to order it instead.  Daughter wants for her mom to have an MRI if can assist us with these orders  Please advise  Best regards  Jeannette/MRI  [phone]

## 2023-08-11 ENCOUNTER — HOSPITAL ENCOUNTER (OUTPATIENT)
Dept: RADIOLOGY | Facility: HOSPITAL | Age: 78
Discharge: HOME | End: 2023-08-11
Attending: STUDENT IN AN ORGANIZED HEALTH CARE EDUCATION/TRAINING PROGRAM
Payer: MEDICARE

## 2023-08-11 ENCOUNTER — DOCUMENTATION ONLY (OUTPATIENT)
Dept: CARDIOLOGY | Facility: HOSPITAL | Age: 78
End: 2023-08-11
Payer: MEDICARE

## 2023-08-11 ENCOUNTER — HOSPITAL ENCOUNTER (OUTPATIENT)
Dept: RADIOLOGY | Facility: HOSPITAL | Age: 78
Discharge: HOME | End: 2023-08-11
Attending: INTERNAL MEDICINE
Payer: MEDICARE

## 2023-08-11 VITALS — HEART RATE: 70 BPM | OXYGEN SATURATION: 91 %

## 2023-08-11 DIAGNOSIS — M47.22 CERVICAL SPONDYLOSIS WITH RADICULOPATHY: ICD-10-CM

## 2023-08-11 DIAGNOSIS — M51.36 DEGENERATION OF LUMBAR INTERVERTEBRAL DISC: ICD-10-CM

## 2023-08-11 DIAGNOSIS — Z01.818 PRE-OP EVALUATION: ICD-10-CM

## 2023-08-11 DIAGNOSIS — M50.30 DEGENERATION OF CERVICAL INTERVERTEBRAL DISC: ICD-10-CM

## 2023-08-11 DIAGNOSIS — M47.896 OTHER SPONDYLOSIS, LUMBAR REGION: ICD-10-CM

## 2023-08-11 DIAGNOSIS — M47.892 OTHER SPONDYLOSIS, CERVICAL REGION: ICD-10-CM

## 2023-08-11 DIAGNOSIS — M47.26 OTHER SPONDYLOSIS WITH RADICULOPATHY, LUMBAR REGION: ICD-10-CM

## 2023-08-11 DIAGNOSIS — M47.816 SPONDYLOSIS WITHOUT MYELOPATHY OR RADICULOPATHY, LUMBAR REGION: ICD-10-CM

## 2023-08-11 DIAGNOSIS — M47.812 CERVICAL SPONDYLOSIS WITHOUT MYELOPATHY: ICD-10-CM

## 2023-08-11 PROCEDURE — 71046 XR CHEST PA AND LATERAL: ICD-10-PCS | Mod: 26,,, | Performed by: RADIOLOGY

## 2023-08-11 PROCEDURE — 71046 X-RAY EXAM CHEST 2 VIEWS: CPT | Mod: 26,,, | Performed by: RADIOLOGY

## 2023-08-11 PROCEDURE — 72141 MRI CERVICAL SPINE WITHOUT CONTRAST: ICD-10-PCS | Mod: 26,,, | Performed by: RADIOLOGY

## 2023-08-11 PROCEDURE — 72148 MRI LUMBAR SPINE WITHOUT CONTRAST: ICD-10-PCS | Mod: 26,,, | Performed by: RADIOLOGY

## 2023-08-11 PROCEDURE — 72148 MRI LUMBAR SPINE W/O DYE: CPT | Mod: 26,,, | Performed by: RADIOLOGY

## 2023-08-11 PROCEDURE — 71046 X-RAY EXAM CHEST 2 VIEWS: CPT | Mod: TC,FY

## 2023-08-11 PROCEDURE — 72141 MRI NECK SPINE W/O DYE: CPT | Mod: 26,,, | Performed by: RADIOLOGY

## 2023-08-11 PROCEDURE — 72148 MRI LUMBAR SPINE W/O DYE: CPT | Mod: TC

## 2023-08-11 PROCEDURE — 72141 MRI NECK SPINE W/O DYE: CPT | Mod: TC

## 2023-08-11 NOTE — PROGRESS NOTES
Pt MRI done and tolerated fine / pt cardiac device placed back to normal mode and pt to W/C escorted to WR and D/C to family

## 2023-08-11 NOTE — PROGRESS NOTES
Pt arrived earlier and moved to Zone 3 cardiac device placed to MRI safe mode by [employer] Rep and pt to table / monitor placed and V/S to flow sheet

## 2023-08-11 NOTE — PROGRESS NOTES
fflap ProMRI Cardiology checklist for device programming pre MRI:    The patient's medical record was reviewed for the following:    A ProMRI® pacemaker or ICD system has been implanted pectorally    Leads have been implanted for at least six weeks    No additional active or abandoned cardiac implants like leads or wires, lead extenders or adapters  are present    Other active or passive implants are permitted if MR-conditional; metal implantable devices larger than 5 cm must be 4 cm or greater distance from the ProMRI® lead    Device threshold does not exceed 2.0 V at 0.4 ms pulse width    Device is functioning normally      The battery status is neither ISAI nor EOS    The patients ProMRI® system will be programmed by the industry represeentative to a mode suitable for MRI.    Pacing parameters should be programmed as such:     VOO: (for single ventricular lead devices or patients with persistent atrial fibrillation) program base rate + 10 bpm above the patients intrinsic ventricular or +10 bpm above programmed base rate if ventricular pacing is noted

## 2023-08-29 ENCOUNTER — OFFICE VISIT (OUTPATIENT)
Dept: NEUROLOGY | Facility: CLINIC | Age: 78
End: 2023-08-29
Payer: MEDICARE

## 2023-08-29 VITALS
WEIGHT: 240 LBS | SYSTOLIC BLOOD PRESSURE: 104 MMHG | BODY MASS INDEX: 42.52 KG/M2 | HEIGHT: 63 IN | DIASTOLIC BLOOD PRESSURE: 55 MMHG | HEART RATE: 70 BPM

## 2023-08-29 DIAGNOSIS — R56.9 SEIZURE-LIKE ACTIVITY: ICD-10-CM

## 2023-08-29 DIAGNOSIS — E11.42 DIABETIC POLYNEUROPATHY ASSOCIATED WITH TYPE 2 DIABETES MELLITUS: Primary | ICD-10-CM

## 2023-08-29 DIAGNOSIS — Z86.73 HISTORY OF STROKE: ICD-10-CM

## 2023-08-29 DIAGNOSIS — M54.16 LUMBAR RADICULOPATHY: ICD-10-CM

## 2023-08-29 DIAGNOSIS — I48.20 CHRONIC ATRIAL FIBRILLATION: ICD-10-CM

## 2023-08-29 DIAGNOSIS — M54.12 CERVICAL RADICULOPATHY: ICD-10-CM

## 2023-08-29 PROCEDURE — 3078F PR MOST RECENT DIASTOLIC BLOOD PRESSURE < 80 MM HG: ICD-10-PCS | Mod: CPTII,S$GLB,, | Performed by: PSYCHIATRY & NEUROLOGY

## 2023-08-29 PROCEDURE — 1160F RVW MEDS BY RX/DR IN RCRD: CPT | Mod: CPTII,S$GLB,, | Performed by: PSYCHIATRY & NEUROLOGY

## 2023-08-29 PROCEDURE — 99999 PR PBB SHADOW E&M-EST. PATIENT-LVL III: CPT | Mod: PBBFAC,,, | Performed by: PSYCHIATRY & NEUROLOGY

## 2023-08-29 PROCEDURE — 1159F PR MEDICATION LIST DOCUMENTED IN MEDICAL RECORD: ICD-10-PCS | Mod: CPTII,S$GLB,, | Performed by: PSYCHIATRY & NEUROLOGY

## 2023-08-29 PROCEDURE — 3078F DIAST BP <80 MM HG: CPT | Mod: CPTII,S$GLB,, | Performed by: PSYCHIATRY & NEUROLOGY

## 2023-08-29 PROCEDURE — 1125F PR PAIN SEVERITY QUANTIFIED, PAIN PRESENT: ICD-10-PCS | Mod: CPTII,S$GLB,, | Performed by: PSYCHIATRY & NEUROLOGY

## 2023-08-29 PROCEDURE — 1157F PR ADVANCE CARE PLAN OR EQUIV PRESENT IN MEDICAL RECORD: ICD-10-PCS | Mod: CPTII,S$GLB,, | Performed by: PSYCHIATRY & NEUROLOGY

## 2023-08-29 PROCEDURE — 3074F SYST BP LT 130 MM HG: CPT | Mod: CPTII,S$GLB,, | Performed by: PSYCHIATRY & NEUROLOGY

## 2023-08-29 PROCEDURE — 99215 PR OFFICE/OUTPT VISIT, EST, LEVL V, 40-54 MIN: ICD-10-PCS | Mod: S$GLB,,, | Performed by: PSYCHIATRY & NEUROLOGY

## 2023-08-29 PROCEDURE — 99215 OFFICE O/P EST HI 40 MIN: CPT | Mod: S$GLB,,, | Performed by: PSYCHIATRY & NEUROLOGY

## 2023-08-29 PROCEDURE — 3288F FALL RISK ASSESSMENT DOCD: CPT | Mod: CPTII,S$GLB,, | Performed by: PSYCHIATRY & NEUROLOGY

## 2023-08-29 PROCEDURE — 1101F PR PT FALLS ASSESS DOC 0-1 FALLS W/OUT INJ PAST YR: ICD-10-PCS | Mod: CPTII,S$GLB,, | Performed by: PSYCHIATRY & NEUROLOGY

## 2023-08-29 PROCEDURE — 1157F ADVNC CARE PLAN IN RCRD: CPT | Mod: CPTII,S$GLB,, | Performed by: PSYCHIATRY & NEUROLOGY

## 2023-08-29 PROCEDURE — 99999 PR PBB SHADOW E&M-EST. PATIENT-LVL III: ICD-10-PCS | Mod: PBBFAC,,, | Performed by: PSYCHIATRY & NEUROLOGY

## 2023-08-29 PROCEDURE — 3074F PR MOST RECENT SYSTOLIC BLOOD PRESSURE < 130 MM HG: ICD-10-PCS | Mod: CPTII,S$GLB,, | Performed by: PSYCHIATRY & NEUROLOGY

## 2023-08-29 PROCEDURE — 1160F PR REVIEW ALL MEDS BY PRESCRIBER/CLIN PHARMACIST DOCUMENTED: ICD-10-PCS | Mod: CPTII,S$GLB,, | Performed by: PSYCHIATRY & NEUROLOGY

## 2023-08-29 PROCEDURE — 1101F PT FALLS ASSESS-DOCD LE1/YR: CPT | Mod: CPTII,S$GLB,, | Performed by: PSYCHIATRY & NEUROLOGY

## 2023-08-29 PROCEDURE — 1125F AMNT PAIN NOTED PAIN PRSNT: CPT | Mod: CPTII,S$GLB,, | Performed by: PSYCHIATRY & NEUROLOGY

## 2023-08-29 PROCEDURE — 3288F PR FALLS RISK ASSESSMENT DOCUMENTED: ICD-10-PCS | Mod: CPTII,S$GLB,, | Performed by: PSYCHIATRY & NEUROLOGY

## 2023-08-29 PROCEDURE — 1159F MED LIST DOCD IN RCRD: CPT | Mod: CPTII,S$GLB,, | Performed by: PSYCHIATRY & NEUROLOGY

## 2023-08-29 NOTE — PROGRESS NOTES
[facility] NEUROLOGY  [facility], [place]    Date: 8/29/23  Patient Name: Adriana Strong   MRN: [id number]   PCP: Krzysztof Mcgraw  Referring Provider: No ref. provider found    Chief Complaint:  Tremor  Subjective:        HPI:   Ms. Adriana Strong is a 78 y.o. RH female presenting for evaluation of tremor.  Joined by  and daughter.  They expressed concern over the possibility of Parkinson's disease given the fact that her sister suffers with Parkinson's.  Want to get checked out to be sure this was not an issue.      Patient describes tremor that is not present at rest.  Mainly noticed when doing fine motor tasks like holding a cup or using utensils.  Denies trouble with buttons or coordinating movements of the hands.  No changes in speech.  Uses a walker for stability due to known history of cervical and lumbar myelopathy and multilevel radiculopathy.  Follows closely with pain management.  Recently discuss exploring injections.  Expressed that she would like to pursue injections or other surgical interventions which is understandable given extensive cardiac history.    Denies any recent worsening of pain or symptoms related to spinal stenosis does have history falls.  Also history of peripheral artery disease status post stenting    History of AFib on chronic anticoagulation    PAST MEDICAL HISTORY:  Past Medical History:   Diagnosis Date    Acute on chronic diastolic (congestive) heart failure 11/20/2019    Anticoagulant long-term use     on Plavix since May 2015    Anxiety     Arthritis     Asthma     Atrial fibrillation     Atrial fibrillation with RVR 10/19/2020    Cataracts, bilateral     Chest pain, atypical     Chronic atrial fibrillation with rapid ventricular response 6/23/2017    Colon polyp     Coronary artery disease     Diabetes mellitus     Dry eyes     Esophageal erosions     GERD (gastroesophageal reflux disease)     Heart murmur     Hemorrhoid     High cholesterol      Hypertension     Irritable bowel syndrome     Paroxysmal atrial fibrillation 10/30/2020    Persistent atrial fibrillation 2/10/2020    Shingles 2015    Stenosis of aortic and mitral valves     Stroke     TIA (transient ischemic attack)     Use of cane as ambulatory aid        PAST SURGICAL HISTORY:  Past Surgical History:   Procedure Laterality Date    ABDOMINAL SURGERY      stents to SMA    angiocele      2007, 2014    AORTIC VALVE REPLACEMENT N/A 11/19/2019    Procedure: Replacement-valve-aortic;  Surgeon: Jack Capone MD;  Location: [facility] CATH LAB;  Service: Cardiology;  Laterality: N/A;    BREAST SURGERY      left--- a lump--- no cancer    CARDIAC VALVE SURGERY      COLONOSCOPY  2014    COLONOSCOPY N/A 3/14/2018    Procedure: COLONOSCOPY;  Surgeon: Efren Kemp MD;  Location: [facility] ENDO (4TH FLR);  Service: Endoscopy;  Laterality: N/A;  ok to hold Plavix 5 days prior to procedure per Dr RESHMA Hernandez     ok per Dr Kemp to hold Eliquis 2 days prior to procedure (see telephone encounter dated-2/7/18)    HERNIA REPAIR      HYSTERECTOMY  partial    1982 partial hysterectomy    LEFT HEART CATHETERIZATION Right 11/5/2019    Procedure: Left heart cath;  Surgeon: Jack Capone MD;  Location: [facility] CATH LAB;  Service: Cardiology;  Laterality: Right;    left nasal polyp      left neck lymph node      nose polyp      right hip fatty mass tissue      stent to small intestine      SUPERIOR VENA CAVA ANGIOPLASTY / STENTING      TONSILLECTOMY      TOTAL ABDOMINAL HYSTERECTOMY      2014    TOTAL KNEE ARTHROPLASTY Bilateral     TREATMENT OF CARDIAC ARRHYTHMIA N/A 2/10/2020    Procedure: CARDIOVERSION;  Surgeon: Jayy Parrish MD;  Location: [facility] EP LAB;  Service: Cardiology;  Laterality: N/A;  AF, PEDRO, DCCV, MAC, DM, 3 Prep    TREATMENT OF CARDIAC ARRHYTHMIA N/A 5/15/2020    Procedure: CARDIOVERSION;  Surgeon: Hany Bee MD;  Location: [facility] EP LAB;  Service: Cardiology;  Laterality: N/A;  AF, PEDRO, DCCV, MAC, DM, 3 Prep     UPPER GASTROINTESTINAL ENDOSCOPY  2014       CURRENT MEDS:  Current Outpatient Medications   Medication Sig Dispense Refill    acetaminophen (TYLENOL) 650 MG TbSR Take 1,300 mg by mouth 2 (two) times daily as needed.      albuterol-ipratropium (DUO-NEB) 2.5 mg-0.5 mg/3 mL nebulizer solution NEBULIZE 1 VIAL EVERY 4 HOURS AS NEEDED      apixaban (ELIQUIS) 5 mg Tab TAKE 1 TABLET BY MOUTH TWICE A  tablet 3    ascorbic acid, vitamin C, (VITAMIN C) 500 MG tablet Take 1,000 mg by mouth once daily.       calcium carbonate/vitamin D3 (CALTRATE 600 + D ORAL) Take 1 tablet by mouth once daily.      carvediloL (COREG) 12.5 MG tablet Take 1 tablet (12.5 mg total) by mouth 2 (two) times daily. 180 tablet 3    clopidogreL (PLAVIX) 75 mg tablet Take 1 tablet (75 mg total) by mouth once daily. 90 tablet 3    diltiaZEM (CARDIZEM CD) 240 MG 24 hr capsule TAKE 1 CAPSULE BY MOUTH ONCE A DAY 90 capsule 3    fish oil-omega-3 fatty acids 300-1,000 mg capsule Take 1 g by mouth once daily.       montelukast (SINGULAIR) 10 mg tablet Take 10 mg by mouth once daily.      mv-mn/folic ac/calcium/vit K1 (WOMEN'S 50 PLUS MULTIVITAMIN ORAL) Take 1 tablet by mouth once daily.      pantoprazole (PROTONIX) 40 MG tablet Take 1 tablet (40 mg total) by mouth once daily. 90 tablet 3    POLYSORBATE 80/GLYCERIN (REFRESH DRY EYE THERAPY OPHT) Apply to eye 2 (two) times daily.      potassium gluconate 550 mg (90 mg) Tab Take 180 mg by mouth 2 (two) times a day.      ranolazine (RANEXA) 1,000 mg Tb12 Take 1 tablet (1,000 mg total) by mouth 2 (two) times daily. 180 tablet 3    SYMBICORT 160-4.5 mcg/actuation HFAA Inhale 1 puff into the lungs 2 (two) times daily.      vitamin D (VITAMIN D3) 1000 units Tab Take 2,000 Units by mouth once daily.      atorvastatin (LIPITOR) 10 MG tablet Take 1 tablet (10 mg total) by mouth once daily. 90 tablet 3    DULoxetine (CYMBALTA) 20 MG capsule Take 1 capsule (20 mg total) by mouth once daily. 90 capsule 3     fexofenadine (ALLEGRA) 60 MG tablet Take 3 tablets (180 mg total) by mouth every morning. 270 tablet 3    furosemide (LASIX) 40 MG tablet Take 1 tablet (40 mg total) by mouth 2 (two) times a day. 180 tablet 3    gabapentin (NEURONTIN) 300 MG capsule Take 2 capsules (600 mg total) by mouth 3 (three) times daily. (Patient taking differently: Take 600 mg by mouth 3 (three) times daily. Patient takes 1 in the am, 1 in the afternoon, and 2 in the evening.) 540 capsule 3    isosorbide mononitrate (IMDUR) 60 MG 24 hr tablet Take 1 tablet (60 mg total) by mouth once daily. 90 tablet 3    LORazepam (ATIVAN) 0.5 MG tablet Take 1 tablet (0.5 mg total) by mouth every morning. 30 tablet 2    losartan (COZAAR) 25 MG tablet Take 1 tablet (25 mg total) by mouth once daily. 90 tablet 3    metFORMIN (GLUCOPHAGE) 500 MG tablet Take 1 tablet (500 mg total) by mouth 2 (two) times daily. 180 tablet 3    nitroGLYCERIN (NITROSTAT) 0.4 MG SL tablet Place 1 tablet (0.4 mg total) under the tongue every 5 (five) minutes as needed for Chest pain. 25 tablet PRN    oxybutynin (DITROPAN-XL) 5 MG TR24 Take 1 tablet (5 mg total) by mouth every other day. 45 tablet 3     No current facility-administered medications for this visit.     Facility-Administered Medications Ordered in Other Visits   Medication Dose Route Frequency Provider Last Rate Last Admin    0.9%  NaCl infusion   Intravenous Continuous Lynette Hightower NP   New Bag at 02/10/20 1029    sodium chloride 0.9% flush 5 mL  5 mL Intravenous PRN Lynette Hightower NP           ALLERGIES:  Review of patient's allergies indicates:   Allergen Reactions    Celebrex [celecoxib] Shortness Of Breath    Ciprofloxacin Swelling     lip    Dicyclomine Hives    Adhesive Dermatitis    Avelox [moxifloxacin] Swelling    Cilostazol Other (See Comments)     Elevates blood pressure    Eryc [erythromycin] Other (See Comments)     unknown    Iodine and iodide containing products Hives    Keflex  "[cephalexin] Hives    Meclomen      rashes    Meloxicam      Ear ringing    Metoclopramide Other (See Comments)     High blood pressure    Sulfa (sulfonamide antibiotics) Itching       FAMILY HISTORY:  Family History   Problem Relation Age of Onset    Heart attack Mother     Stroke Father     Heart failure Brother     Colon cancer Neg Hx     Inflammatory bowel disease Neg Hx        SOCIAL HISTORY:  Social History     Tobacco Use    Smoking status: Never    Smokeless tobacco: Never   Substance Use Topics    Alcohol use: No    Drug use: No       Review of Systems:  Gen: no fever, no chills, no generalized feeling of weakness   HEENT: no double vision, no blurred vision, no eye pain, no eye exudates. no nasal congestion,   no traumatic injury of head, no neck pain, no neck stiffness. no photophobia or phonophobia at this time. ?    Heart: no chest pain, no SOB    Lungs: no SOB, no cough    MSK: no weakness of legs, intact ROM    ABD: no abd pain, no N/V/D/C, no difficulty with defecation.    Extremities: No leg pain, no edema.       Objective:     Vitals:    08/29/23 0749 08/29/23 0757   BP: (!) 94/49 (!) 104/55   Pulse: 70 70   Weight: 108.9 kg (240 lb)    Height: 5' 3" (1.6 m)      General: female in NAD, alert and awake, Aox3, well groomed, obese. ?    ? ?    HEENT: Head is NC/AT EOMI, pupil size: 4 mm B/L, no nystagmus noted; hearing grossly intact b/l. Mucous membrane moist, uvula midline, no pharyngeal erythema, exudates or discharges.      Neck: Supple. no nuchal rigidity.      Cardiovascular: well perfused, no cyanosis        Respiratory: Symmetric chest rise noted       Musculoskeletal: Muscle tone noted to be adequate for patient age, muscle mass is WNL. No spontaneous movements or fasciculations noted during this examination.       Extremities: (+) pedal edema but no calf tenderness. No cogwheel rigidity noted on B/L UE extremities.      No hypomimia  No global bradykinesia   No global rigidity   Normal " rapid alternating movements and finger tapping in the bilateral upper extremities  No resting tremors observed     No significant tremors observed during today's encounter     Neurological Examination.    Mental status: AA&O x3; Affect/mood is euthymic/congruent; no aphasia noted during examination. Patient answers simple questions appropriately & follows simple commands; no dysarthria or expressive aphasia; no michael-neglect or extinction. Vocabulary/word finding: excellent.       Cranial Nerves: II-XII grossly intact. visual fields intact to confrontation, EOMI, no nystagmus, PERRLA,?facial muscles symmetric and no facial droop noted, patient hearing is grossly intact b/l, ?facial sensation to sharp and light touch grossly intact B/L. Patient smiles, frowns, closes eyes ?forcefully uneventfully. Uvula midline and no difficulty with pronunciation. No SCM/trapezius/muscle of mastication weakness noted B/L. Patient has adequate control of tongue and may protrude it and move it adequately.       Muscle Function: Tone WNL and Muscle bulk WNL.  Mild weakness, 4+/5 in the bilateral hands and bilateral hip flexors but mostly full and symmetric strength throughout      Sensory: ?Intact to light touch throughout.  Vibratory sensation reduced in distal lower extremities     Reflexes:  1/4 and symmetric throughout     Coordination: no dysmetria (finger to nose negative)    Other:   08/11/2023 MRI lumbar spine:  FINDINGS:  Alignment: Reversal of normal lumbar lordosis.  Grade 1 retrolisthesis of L3 on L4, L4 on L5, and L5 on S1.  Vertebrae: No fractures.  Vertebral body heights are well maintained.  Multilevel degenerative endplate changes, most prominent at L3-S1.  Schmorl's node at the inferior endplate of T12.  No aggressive marrow replacement process or fracture.  Discs: Advanced multilevel degenerative disc disease, noting disc height loss with endplate changes.  This is most prominent on the right side at L4-5, noting sub  "endplate marrow edema (series 17, image 5).  Cord: Normal. Conus terminates at L1-L2.  Degenerative findings:  T12-L1: Circumferential disc bulge with broad-based protrusion encroaching the left lateral recess.  Bilateral facet arthropathy.Mild left neural foraminal narrowing.  Mild spinal canal stenosis.  L1-L2: Circumferential disc bulge with small central disc protrusion.  Bilateral facet arthropathy.  Mild bilateral neural foraminal narrowing.  Mild spinal canal stenosis.  L2-L3: Circumferential disc bulge abutting the bilateral lateral recesses.  Bilateral facet arthropathy.  Mild bilateral neural foraminal narrowing.  Mild spinal canal stenosis.  L3-L4: Circumferential disc bulge.  Bilateral facet arthropathy and ligamentum flavum buckling.  These findings contribute to mild bilateral neural foraminal narrowing and moderate spinal canal stenosis.  L4-L5: Asymmetric disc bulging with encroachment of the left lateral recess, possibly encroaching upon the descending left L5 nerve root.  Bilateral facet arthropathy noted.Findings contribute to moderate bilateral neural foraminal narrowing and moderate spinal canal stenosis.  L5-S1: Circumferential disc bulge and facet arthropathy.  These findings contribute to moderate bilateral neural foraminal narrowing.  No spinal canal stenosis.  Paraspinal muscles & soft tissues: Fatty degeneration of the paraspinal muscles.  Impression:  Lumbar spondylosis, contributing to moderate spinal canal stenosi at L3-4 and L4-5 and moderate neural foraminal narrowing at L4-5 and L5-S1, as above.  Advanced L4-5 degenerative disc disease, as above."    08/11/2023 MRI cervical spine without contrast:  FINDINGS:  The craniocervical junction is unremarkable.The cervical cord demonstrates normal size and signal.  No evidence of fracture, marrow replacement process, or spondylo-discitis.  No paraspinal masses or inflammatory changes.  Degenerative changes/ spondylosis:  At C3-4, there nay " "broad-based posterior disc osteophyte complex with uncovertebral spurring, contributing to moderate right and mild left-sided neural foraminal narrowing.  There is mild spinal canal stenosis.  Moderate bilateral facet joint arthropathy noted.  At C4-5, there isprominent degenerative disc disease, noting disc height loss with endplate changes.  Broad-based posterior disc osteophyte complex with uncovertebral spurring and ligamentum flavum hypertrophy noted.  These findings contribute to moderate/severe spinal canal stenosis, severe right and moderate left-sided neural foraminal narrowing.  At C5-6, there nay broad-based posterior disc osteophyte complex with uncovertebral spurring and ligamentum flavum hypertrophy, contributing to severe spinal canal stenosis, severe left and moderate right neural foraminal narrowing.  At C6-7, there nay mild broad-based posterior disc osteophyte complex with uncovertebral spurring.  No spinal canal stenosis or significant neural foraminal narrowing.  At C7-T1, there nay mild broad-based posterior disc osteophyte complex and bilateral facet joint arthropathy.  No spinal canal stenosis or significant neural foraminal narrowing.  Impression:  Cervical spondylosis, contributing to moderate/ severe spinal canal stenosis at C4-5 and C5-6 and moderate/ severe neural foraminal narrowing C3-4 through C5-6, as above."    Assessment:   Adriana Strong is a 78 y.o. female presenting for evaluation due to concerns over tremor.  No parkinsonian features observed on today's exam.  No significant tremor observed on today's exam either.  Suspect postural tremor, possibly enhanced physiological given description of timing and activities.  We will monitor closely with repeat exam in 1 year to screen for evolution.    Chronic pain and known degenerative changes in the cervical and lumbar spine resulting in central and neural foraminal stenosis.  Following closely with pain management.  Is highly " motivated to avoid surgical interventions given heart history.  Advise patient to continue close follow-up with pain service.  Also endorse use of duloxetine and gabapentin in this setting.    History of seizure-like activity with normal EEG and noncontributory MRI in the past.  No further episodes.  Stable and requires no additional interventions at this time    History of stroke confirmed on MRI brain.  Also history of atrial fibrillation.  On chronic anticoagulation with Eliquis, on Plavix due to history of stent, and takes daily statin.    History of diabetic polyneuropathy with typical exam changes.  Advised tight control blood glucose moving forward.     Plan:     Problem List Items Addressed This Visit          Neuro    Diabetic polyneuropathy associated with type 2 diabetes mellitus - Primary    Lumbar radiculopathy    Seizure-like activity       Cardiac/Vascular    Chronic atrial fibrillation     Other Visit Diagnoses       History of stroke        Cervical radiculopathy              - as above in assessment  - cont pain management follow up  - no signs of parkinsonian symptoms on exam  - follow up in 1 yr or sooner as needed.     I spent a total of 45 minutes on the day of the visit. This includes face to face time and non-face to face time preparing to see the patient (eg, review of tests), obtaining and/or reviewing separately obtained history, documenting clinical information in the electronic or other health record, independently interpreting results and communicating results to the patient/family/caregiver, or care coordinator.    A dictation device was used to produce this document. Use of such devices sometimes results in grammatical errors or replacement of words that sound similarly.    Yobani Barrett, DO

## 2023-09-05 RX ORDER — CLOPIDOGREL BISULFATE 75 MG/1
75 TABLET ORAL
Qty: 90 TABLET | Refills: 3 | Status: SHIPPED | OUTPATIENT
Start: 2023-09-05

## 2023-09-08 ENCOUNTER — HOSPITAL ENCOUNTER (OUTPATIENT)
Dept: CARDIOLOGY | Facility: CLINIC | Age: 78
Discharge: HOME | End: 2023-09-08
Payer: MEDICARE

## 2023-09-08 ENCOUNTER — OFFICE VISIT (OUTPATIENT)
Dept: CARDIOLOGY | Facility: CLINIC | Age: 78
End: 2023-09-08
Payer: MEDICARE

## 2023-09-08 ENCOUNTER — TELEPHONE (OUTPATIENT)
Dept: CARDIOLOGY | Facility: CLINIC | Age: 78
End: 2023-09-08
Payer: MEDICARE

## 2023-09-08 VITALS
BODY MASS INDEX: 43.36 KG/M2 | HEIGHT: 64 IN | HEART RATE: 70 BPM | SYSTOLIC BLOOD PRESSURE: 125 MMHG | WEIGHT: 254 LBS | DIASTOLIC BLOOD PRESSURE: 60 MMHG

## 2023-09-08 DIAGNOSIS — E78.5 HYPERLIPIDEMIA, UNSPECIFIED HYPERLIPIDEMIA TYPE: ICD-10-CM

## 2023-09-08 DIAGNOSIS — Z95.0 CARDIAC PACEMAKER IN SITU: ICD-10-CM

## 2023-09-08 DIAGNOSIS — J42 CHRONIC BRONCHITIS, UNSPECIFIED CHRONIC BRONCHITIS TYPE: ICD-10-CM

## 2023-09-08 DIAGNOSIS — K55.1 CHRONIC MESENTERIC ISCHEMIA: ICD-10-CM

## 2023-09-08 DIAGNOSIS — I70.0 AORTIC ATHEROSCLEROSIS: ICD-10-CM

## 2023-09-08 DIAGNOSIS — R07.9 CHEST PAIN, UNSPECIFIED TYPE: ICD-10-CM

## 2023-09-08 DIAGNOSIS — I10 ESSENTIAL HYPERTENSION: ICD-10-CM

## 2023-09-08 DIAGNOSIS — E66.01 MORBID OBESITY: ICD-10-CM

## 2023-09-08 DIAGNOSIS — E11.9 TYPE 2 DIABETES MELLITUS WITHOUT COMPLICATION, WITHOUT LONG-TERM CURRENT USE OF INSULIN: ICD-10-CM

## 2023-09-08 DIAGNOSIS — I48.19 PERSISTENT ATRIAL FIBRILLATION: ICD-10-CM

## 2023-09-08 DIAGNOSIS — Z95.2 S/P TAVR (TRANSCATHETER AORTIC VALVE REPLACEMENT): ICD-10-CM

## 2023-09-08 DIAGNOSIS — R07.9 CHEST PAIN, UNSPECIFIED TYPE: Primary | ICD-10-CM

## 2023-09-08 DIAGNOSIS — Z86.73 OLD LACUNAR STROKE WITHOUT LATE EFFECT: ICD-10-CM

## 2023-09-08 DIAGNOSIS — I25.111 CORONARY ARTERY DISEASE INVOLVING NATIVE CORONARY ARTERY OF NATIVE HEART WITH ANGINA PECTORIS WITH DOCUMENTED SPASM: Primary | ICD-10-CM

## 2023-09-08 PROCEDURE — 1160F PR REVIEW ALL MEDS BY PRESCRIBER/CLIN PHARMACIST DOCUMENTED: ICD-10-PCS | Mod: CPTII,S$GLB,, | Performed by: INTERNAL MEDICINE

## 2023-09-08 PROCEDURE — 1157F PR ADVANCE CARE PLAN OR EQUIV PRESENT IN MEDICAL RECORD: ICD-10-PCS | Mod: CPTII,S$GLB,, | Performed by: INTERNAL MEDICINE

## 2023-09-08 PROCEDURE — 93000 ELECTROCARDIOGRAM COMPLETE: CPT | Mod: S$GLB,,, | Performed by: INTERNAL MEDICINE

## 2023-09-08 PROCEDURE — 99999 PR PBB SHADOW E&M-EST. PATIENT-LVL III: CPT | Mod: PBBFAC,,, | Performed by: INTERNAL MEDICINE

## 2023-09-08 PROCEDURE — 1126F AMNT PAIN NOTED NONE PRSNT: CPT | Mod: CPTII,S$GLB,, | Performed by: INTERNAL MEDICINE

## 2023-09-08 PROCEDURE — 1160F RVW MEDS BY RX/DR IN RCRD: CPT | Mod: CPTII,S$GLB,, | Performed by: INTERNAL MEDICINE

## 2023-09-08 PROCEDURE — 99214 PR OFFICE/OUTPT VISIT, EST, LEVL IV, 30-39 MIN: ICD-10-PCS | Mod: S$GLB,,, | Performed by: INTERNAL MEDICINE

## 2023-09-08 PROCEDURE — 93000 EKG 12-LEAD: ICD-10-PCS | Mod: S$GLB,,, | Performed by: INTERNAL MEDICINE

## 2023-09-08 PROCEDURE — 3078F DIAST BP <80 MM HG: CPT | Mod: CPTII,S$GLB,, | Performed by: INTERNAL MEDICINE

## 2023-09-08 PROCEDURE — 1159F PR MEDICATION LIST DOCUMENTED IN MEDICAL RECORD: ICD-10-PCS | Mod: CPTII,S$GLB,, | Performed by: INTERNAL MEDICINE

## 2023-09-08 PROCEDURE — 1157F ADVNC CARE PLAN IN RCRD: CPT | Mod: CPTII,S$GLB,, | Performed by: INTERNAL MEDICINE

## 2023-09-08 PROCEDURE — 3078F PR MOST RECENT DIASTOLIC BLOOD PRESSURE < 80 MM HG: ICD-10-PCS | Mod: CPTII,S$GLB,, | Performed by: INTERNAL MEDICINE

## 2023-09-08 PROCEDURE — 1159F MED LIST DOCD IN RCRD: CPT | Mod: CPTII,S$GLB,, | Performed by: INTERNAL MEDICINE

## 2023-09-08 PROCEDURE — 1126F PR PAIN SEVERITY QUANTIFIED, NO PAIN PRESENT: ICD-10-PCS | Mod: CPTII,S$GLB,, | Performed by: INTERNAL MEDICINE

## 2023-09-08 PROCEDURE — 3074F PR MOST RECENT SYSTOLIC BLOOD PRESSURE < 130 MM HG: ICD-10-PCS | Mod: CPTII,S$GLB,, | Performed by: INTERNAL MEDICINE

## 2023-09-08 PROCEDURE — 99214 OFFICE O/P EST MOD 30 MIN: CPT | Mod: S$GLB,,, | Performed by: INTERNAL MEDICINE

## 2023-09-08 PROCEDURE — 3074F SYST BP LT 130 MM HG: CPT | Mod: CPTII,S$GLB,, | Performed by: INTERNAL MEDICINE

## 2023-09-08 PROCEDURE — 99999 PR PBB SHADOW E&M-EST. PATIENT-LVL III: ICD-10-PCS | Mod: PBBFAC,,, | Performed by: INTERNAL MEDICINE

## 2023-09-08 NOTE — Clinical Note
Your patient, Adriana Strong , referred herself for evaluation of   Coronary artery disease. Please see my note for details of this encounter. If you have any questions, please contact me.  Thank you for allowing me to participate in care of this patient.

## 2023-09-08 NOTE — PROGRESS NOTES
Chart has been dictated using voice recognition software.  It is not been reviewed carefully for any transcriptional errors due to this technology.   Subjective:   Patient ID:  Adriana Strong is a 78 y.o. female who presents for evaluation of No chief complaint on file.      HPI:  Patient with coronary artery disease, hyperlipidemia, hypertension, aortic stenosis status post TAVR, diabetes, has CVA, heart failure, atrial fibrillation, and a permanent pacemaker.  PET stress test showed no evidence for ischemia with normal LV function. She has seen Dr. Mariee.  The catheterization prior to her TAVR showed a tubular 70% lLAD lesion with majority of the left system perfused off of the LAD diagonal artery.  The patient also has an LIZ stents secondary to chronic mesenteric ischemia which is being followed actively by vascular surgery.  Her last echocardiogram (30-September-2022) showed normal systolic function with concentric hypertrophy.  There was abnormal septal wall motion consistent with a right ventricular pacemaker.  There was no aortic insufficiency with a normally functioning bioprosthetic valve.  There was mild pulmonary systolic hypertension.  A PET stress test (14-September-2022) showed no evidence of ischemia or scar with diffuse calcifications in the LAD circumflex and right coronary arteries and moderate diffuse aortic calcifications in the ascending aorta descending aorta and aortic arch.    Patient has been getting pressure/tightness in her chest and occasionally needs to take nitroglycerin to relieve it. Sleeps on wedge with several pillows.  Has not been checked for sleep apnea.  No PND. Will get lightheadedness at rest and with position changes.  Has not had syncope since pacemaker placed. Has occasional edema    Cardiac risk factors: diabetes, hyperlipidemia, hypertension, obesity, positive family history, sedentary lifestyle    Past Medical History:   Diagnosis Date    Acute on chronic diastolic  (congestive) heart failure 11/20/2019    Anticoagulant long-term use     on Plavix since May 2015    Anxiety     Arthritis     Asthma     Atrial fibrillation     Atrial fibrillation with RVR 10/19/2020    Cataracts, bilateral     Chest pain, atypical     Chronic atrial fibrillation with rapid ventricular response 6/23/2017    Colon polyp     Coronary artery disease     Diabetes mellitus     Dry eyes     Esophageal erosions     GERD (gastroesophageal reflux disease)     Heart murmur     Hemorrhoid     High cholesterol     Hypertension     Irritable bowel syndrome     Paroxysmal atrial fibrillation 10/30/2020    Persistent atrial fibrillation 2/10/2020    Shingles 2015    Stenosis of aortic and mitral valves     Stroke     TIA (transient ischemic attack)     Use of cane as ambulatory aid        Past Surgical History:   Procedure Laterality Date    ABDOMINAL SURGERY      stents to SMA    angiocele      2007, 2014    AORTIC VALVE REPLACEMENT N/A 11/19/2019    Procedure: Replacement-valve-aortic;  Surgeon: Jack Capone MD;  Location: [facility] CATH LAB;  Service: Cardiology;  Laterality: N/A;    BREAST SURGERY      left--- a lump--- no cancer    CARDIAC VALVE SURGERY      COLONOSCOPY  2014    COLONOSCOPY N/A 3/14/2018    Procedure: COLONOSCOPY;  Surgeon: Efren Kemp MD;  Location: [facility] ENDO ([address] [place] [address]);  Service: Endoscopy;  Laterality: N/A;  ok to hold Plavix 5 days prior to procedure per Dr RESHMA Hernandez     ok per Dr Kemp to hold Eliquis 2 days prior to procedure (see telephone encounter dated-2/7/18)    HERNIA REPAIR      HYSTERECTOMY  partial    1982 partial hysterectomy    LEFT HEART CATHETERIZATION Right 11/5/2019    Procedure: Left heart cath;  Surgeon: Jack Capone MD;  Location: [facility] CATH LAB;  Service: Cardiology;  Laterality: Right;    left nasal polyp      left neck lymph node      nose polyp      right hip fatty mass tissue      stent to small intestine      SUPERIOR VENA CAVA ANGIOPLASTY / STENTING       TONSILLECTOMY      TOTAL ABDOMINAL HYSTERECTOMY      2014    TOTAL KNEE ARTHROPLASTY Bilateral     TREATMENT OF CARDIAC ARRHYTHMIA N/A 2/10/2020    Procedure: CARDIOVERSION;  Surgeon: Jayy Parrish MD;  Location: [facility] EP LAB;  Service: Cardiology;  Laterality: N/A;  AF, PEDRO, DCCV, MAC, DM, 3 Prep    TREATMENT OF CARDIAC ARRHYTHMIA N/A 5/15/2020    Procedure: CARDIOVERSION;  Surgeon: Hany Bee MD;  Location: [facility] EP LAB;  Service: Cardiology;  Laterality: N/A;  AF, PEDRO, DCCV, MAC, DM, 3 Prep    UPPER GASTROINTESTINAL ENDOSCOPY  2014       Social History     Tobacco Use    Smoking status: Never    Smokeless tobacco: Never   Substance Use Topics    Alcohol use: No    Drug use: No       Outpatient Medications Prior to Visit   Medication Sig Dispense Refill    acetaminophen (TYLENOL) 650 MG TbSR Take 1,300 mg by mouth 2 (two) times daily as needed.      albuterol-ipratropium (DUO-NEB) 2.5 mg-0.5 mg/3 mL nebulizer solution NEBULIZE 1 VIAL EVERY 4 HOURS AS NEEDED      apixaban (ELIQUIS) 5 mg Tab TAKE 1 TABLET BY MOUTH TWICE A  tablet 3    ascorbic acid, vitamin C, (VITAMIN C) 500 MG tablet Take 1,000 mg by mouth once daily.       atorvastatin (LIPITOR) 10 MG tablet Take 1 tablet (10 mg total) by mouth once daily. 90 tablet 3    calcium carbonate/vitamin D3 (CALTRATE 600 + D ORAL) Take 1 tablet by mouth once daily.      carvediloL (COREG) 12.5 MG tablet Take 1 tablet (12.5 mg total) by mouth 2 (two) times daily. 180 tablet 3    clopidogreL (PLAVIX) 75 mg tablet TAKE 1 TABLET BY MOUTH EVERY DAY 90 tablet 3    diltiaZEM (CARDIZEM CD) 240 MG 24 hr capsule TAKE 1 CAPSULE BY MOUTH ONCE A DAY 90 capsule 3    DULoxetine (CYMBALTA) 20 MG capsule Take 1 capsule (20 mg total) by mouth once daily. 90 capsule 3    fexofenadine (ALLEGRA) 60 MG tablet Take 3 tablets (180 mg total) by mouth every morning. 270 tablet 3    fish oil-omega-3 fatty acids 300-1,000 mg capsule Take 1 g by mouth once daily.       furosemide  (LASIX) 40 MG tablet Take 1 tablet (40 mg total) by mouth 2 (two) times a day. 180 tablet 3    gabapentin (NEURONTIN) 300 MG capsule Take 2 capsules (600 mg total) by mouth 3 (three) times daily. (Patient taking differently: Take 600 mg by mouth 3 (three) times daily. Patient takes 1 in the am, 1 in the afternoon, and 2 in the evening.) 540 capsule 3    isosorbide mononitrate (IMDUR) 60 MG 24 hr tablet Take 1 tablet (60 mg total) by mouth once daily. 90 tablet 3    LORazepam (ATIVAN) 0.5 MG tablet Take 1 tablet (0.5 mg total) by mouth every morning. 30 tablet 2    losartan (COZAAR) 25 MG tablet Take 1 tablet (25 mg total) by mouth once daily. 90 tablet 3    metFORMIN (GLUCOPHAGE) 500 MG tablet Take 1 tablet (500 mg total) by mouth 2 (two) times daily. 180 tablet 3    montelukast (SINGULAIR) 10 mg tablet Take 10 mg by mouth once daily.      mv-mn/folic ac/calcium/vit K1 (WOMEN'S 50 PLUS MULTIVITAMIN ORAL) Take 1 tablet by mouth once daily.      nitroGLYCERIN (NITROSTAT) 0.4 MG SL tablet Place 1 tablet (0.4 mg total) under the tongue every 5 (five) minutes as needed for Chest pain. 25 tablet PRN    oxybutynin (DITROPAN-XL) 5 MG TR24 Take 1 tablet (5 mg total) by mouth every other day. 45 tablet 3    pantoprazole (PROTONIX) 40 MG tablet Take 1 tablet (40 mg total) by mouth once daily. 90 tablet 3    POLYSORBATE 80/GLYCERIN (REFRESH DRY EYE THERAPY OPHT) Apply to eye 2 (two) times daily.      potassium gluconate 550 mg (90 mg) Tab Take 180 mg by mouth 2 (two) times a day.      ranolazine (RANEXA) 1,000 mg Tb12 Take 1 tablet (1,000 mg total) by mouth 2 (two) times daily. 180 tablet 3    SYMBICORT 160-4.5 mcg/actuation HFAA Inhale 1 puff into the lungs 2 (two) times daily.      vitamin D (VITAMIN D3) 1000 units Tab Take 2,000 Units by mouth once daily.       Facility-Administered Medications Prior to Visit   Medication Dose Route Frequency Provider Last Rate Last Admin    0.9%  NaCl infusion   Intravenous Continuous  "Lynette Hightower NP   New Bag at 02/10/20 1029    sodium chloride 0.9% flush 5 mL  5 mL Intravenous PRN Lynette Hightower NP           Review of patient's allergies indicates:   Allergen Reactions    Celebrex [celecoxib] Shortness Of Breath    Ciprofloxacin Swelling     lip    Dicyclomine Hives    Adhesive Dermatitis    Avelox [moxifloxacin] Swelling    Cilostazol Other (See Comments)     Elevates blood pressure    Eryc [erythromycin] Other (See Comments)     unknown    Iodine and iodide containing products Hives    Keflex [cephalexin] Hives    Meclomen      rashes    Meloxicam      Ear ringing    Metoclopramide Other (See Comments)     High blood pressure    Sulfa (sulfonamide antibiotics) Itching       Review of Systems   Constitutional: Positive for weight gain.   HENT:  Positive for nosebleeds.    Respiratory:  Negative for hemoptysis.    Hematologic/Lymphatic: Negative for bleeding problem. Bruises/bleeds easily.   Musculoskeletal:  Positive for back pain.   Gastrointestinal:  Negative for hematemesis and hematochezia.   Genitourinary:  Negative for hematuria.   Neurological:  Positive for focal weakness (left side), loss of balance and sensory change (neuropathy in feet.).      Objective:   Physical Exam  Constitutional:       Appearance: She is well-developed. She is morbidly obese.      Comments: /60   Pulse 70   Ht 5' 4" (1.626 m)   Wt 115.2 kg (253 lb 15.5 oz)   LMP 01/21/1973 (LMP Unknown)   BMI 43.59 kg/m²    Neck:      Thyroid: No thyromegaly.      Vascular: No carotid bruit or JVD.   Cardiovascular:      Rate and Rhythm: Normal rate and regular rhythm.      Pulses: Intact distal pulses.      Heart sounds: S1 normal. Opening snap: Loud S2. Murmur heard.      Harsh midsystolic murmur is present at the upper right sternal border radiating to the neck.      No friction rub. Gallop present. S4 sounds present.   Pulmonary:      Effort: Pulmonary effort is normal.      Breath sounds: " "Wheezing (bilateral, end exhalation) present. No rales.   Abdominal:      General: Bowel sounds are normal.      Palpations: Abdomen is soft.      Tenderness: There is no abdominal tenderness.   Musculoskeletal:      Cervical back: Neck supple.      Right lower leg: Edema (1+ ankle taper to knee) present.      Left lower leg: Edema (1+ ankle taper to knee) present.   Skin:     Nails: There is no clubbing.   Neurological:      Mental Status: She is alert and oriented to person, place, and time.         Lab Results   Component Value Date    WBC 8.00 05/23/2023    HGB 12.1 05/23/2023    HCT 37.0 05/23/2023    MCV 95 05/23/2023     05/23/2023       Lab Results   Component Value Date     (L) 05/23/2023    K 4.8 05/23/2023    BUN 22 05/23/2023    CREATININE 0.9 05/23/2023    GLU 94 05/23/2023    HGBA1C 5.8 (H) 08/11/2022    CHOL 149 03/22/2023    HDL 63 03/22/2023    LDLCALC 62.4 (L) 03/22/2023    TRIG 118 03/22/2023    CHOLHDL 42.3 03/22/2023    HGB 12.1 05/23/2023    HCT 37.0 05/23/2023    HCT 42 11/26/2019     05/23/2023    INR 1.2 09/30/2022     ECG (today) shows significant artifact such that an atrial rhythm can not be detected.  There is a ventricular paced rhythm.  Assessment:     1. Coronary artery disease involving native coronary artery of native heart with angina pectoris with documented spasm    2. S/P TAVR (transcatheter aortic valve replacement)    3. Essential hypertension    4. Old lacunar stroke without late effect    5. Type 2 diabetes mellitus without complication, without long-term current use of insulin    6. Chronic mesenteric ischemia    7. Hyperlipidemia, unspecified hyperlipidemia type    8. Persistent atrial fibrillation    9. Morbid obesity    10. Aortic atherosclerosis    11. Chronic bronchitis, unspecified chronic bronchitis type    12. Cardiac pacemaker in situ        The patient is a very complicated patient "and it is difficult to tell whether her symptoms are due to " coronary ischemia or heart failure or neither.  Therefore, the patient will be sent for another PET stress scan to determine whether she has evidence of new coronary occlusions.   Review of her catheterization films from 2019 confirm the tubular lesion in her LAD but does not show any clear targets for doing angioplasty at that time.  Therefore, the results of the PET will be very informative as far as how to proceed.      The patient has a murmur from her TAVR but does not have any evidence of significant aortic stenosis.  Her blood pressure is controlled at this time as is her heart rate from atrial fibrillation.  The patient does not have any GI discomfort suggestive of mesenteric ischemia. Her pacemaker appears to be working appropriately.  She remains in atrial fibrillation with her heart rate controlled by the pacemaker.  Her bronchitis is being managed by her primary care physician.The patient has been encouraged to exercise as well as to lose weight.      No change in her therapy will be made at this time but will depend on the results of the PET stress test. Unless there are intervening problems, patient should return for re-evaluation in 3 months.     Plan:     Diagnoses and all orders for this visit:    Coronary artery disease involving native coronary artery of native heart with angina pectoris with documented spasm    S/P TAVR (transcatheter aortic valve replacement)    Essential hypertension    Old lacunar stroke without late effect    Type 2 diabetes mellitus without complication, without long-term current use of insulin    Chronic mesenteric ischemia    Hyperlipidemia, unspecified hyperlipidemia type    Persistent atrial fibrillation    Morbid obesity    Aortic atherosclerosis    Chronic bronchitis, unspecified chronic bronchitis type    Cardiac pacemaker in situ          Nate Landis MD  Consultative Cardiology

## 2023-09-14 ENCOUNTER — PATIENT MESSAGE (OUTPATIENT)
Dept: CARDIOLOGY | Facility: CLINIC | Age: 78
End: 2023-09-14
Payer: MEDICARE

## 2023-09-15 DIAGNOSIS — I48.19 PERSISTENT ATRIAL FIBRILLATION: Primary | ICD-10-CM

## 2023-09-21 ENCOUNTER — HOSPITAL ENCOUNTER (INPATIENT)
Facility: HOSPITAL | Age: 78
LOS: 5 days | Discharge: HOME HEALTH | DRG: 291 | End: 2023-09-26
Attending: STUDENT IN AN ORGANIZED HEALTH CARE EDUCATION/TRAINING PROGRAM | Admitting: INTERNAL MEDICINE
Payer: MEDICARE

## 2023-09-21 DIAGNOSIS — I50.33 ACUTE ON CHRONIC HEART FAILURE WITH PRESERVED EJECTION FRACTION: Primary | ICD-10-CM

## 2023-09-21 DIAGNOSIS — R07.9 CHEST PAIN: ICD-10-CM

## 2023-09-21 DIAGNOSIS — J42 CHRONIC BRONCHITIS, UNSPECIFIED CHRONIC BRONCHITIS TYPE: ICD-10-CM

## 2023-09-21 DIAGNOSIS — I50.30 DIASTOLIC CONGESTIVE HEART FAILURE: ICD-10-CM

## 2023-09-21 DIAGNOSIS — J44.9 CHRONIC OBSTRUCTIVE PULMONARY DISEASE, UNSPECIFIED COPD TYPE: ICD-10-CM

## 2023-09-21 DIAGNOSIS — I10 ESSENTIAL HYPERTENSION: ICD-10-CM

## 2023-09-21 DIAGNOSIS — I50.9 ACUTE ON CHRONIC CONGESTIVE HEART FAILURE, UNSPECIFIED HEART FAILURE TYPE: ICD-10-CM

## 2023-09-21 DIAGNOSIS — R06.02 SOB (SHORTNESS OF BREATH): ICD-10-CM

## 2023-09-21 LAB
ALBUMIN SERPL BCP-MCNC: 3.2 G/DL (ref 3.5–5.2)
ALLENS TEST: ABNORMAL
ALP SERPL-CCNC: 38 U/L (ref 55–135)
ALT SERPL W/O P-5'-P-CCNC: 33 U/L (ref 10–44)
ANION GAP SERPL CALC-SCNC: 10 MMOL/L (ref 8–16)
AST SERPL-CCNC: 46 U/L (ref 10–40)
BACTERIA #/AREA URNS AUTO: ABNORMAL /HPF
BASOPHILS # BLD AUTO: 0.03 K/UL (ref 0–0.2)
BASOPHILS NFR BLD: 0.4 % (ref 0–1.9)
BILIRUB SERPL-MCNC: 0.5 MG/DL (ref 0.1–1)
BILIRUB UR QL STRIP: NEGATIVE
BNP SERPL-MCNC: 395 PG/ML (ref 0–99)
BUN SERPL-MCNC: 16 MG/DL (ref 8–23)
CALCIUM SERPL-MCNC: 8.9 MG/DL (ref 8.7–10.5)
CHLORIDE SERPL-SCNC: 89 MMOL/L (ref 95–110)
CLARITY UR REFRACT.AUTO: CLEAR
CO2 SERPL-SCNC: 29 MMOL/L (ref 23–29)
COLOR UR AUTO: YELLOW
CREAT SERPL-MCNC: 0.7 MG/DL (ref 0.5–1.4)
DIFFERENTIAL METHOD: ABNORMAL
EOSINOPHIL # BLD AUTO: 0 K/UL (ref 0–0.5)
EOSINOPHIL NFR BLD: 0.1 % (ref 0–8)
ERYTHROCYTE [DISTWIDTH] IN BLOOD BY AUTOMATED COUNT: 14.4 % (ref 11.5–14.5)
EST. GFR  (NO RACE VARIABLE): >60 ML/MIN/1.73 M^2
ESTIMATED AVG GLUCOSE: 105 MG/DL (ref 68–131)
GLUCOSE SERPL-MCNC: 98 MG/DL (ref 70–110)
GLUCOSE UR QL STRIP: NEGATIVE
HBA1C MFR BLD: 5.3 % (ref 4–5.6)
HCO3 UR-SCNC: 35.3 MMOL/L (ref 17–27)
HCT VFR BLD AUTO: 34.2 % (ref 37–48.5)
HCV AB SERPL QL IA: NORMAL
HGB BLD-MCNC: 10.9 G/DL (ref 12–16)
HGB UR QL STRIP: ABNORMAL
HIV 1+2 AB+HIV1 P24 AG SERPL QL IA: NORMAL
IMM GRANULOCYTES # BLD AUTO: 0.05 K/UL (ref 0–0.04)
IMM GRANULOCYTES NFR BLD AUTO: 0.7 % (ref 0–0.5)
KETONES UR QL STRIP: NEGATIVE
LEUKOCYTE ESTERASE UR QL STRIP: NEGATIVE
LYMPHOCYTES # BLD AUTO: 1 K/UL (ref 1–4.8)
LYMPHOCYTES NFR BLD: 14.9 % (ref 18–48)
MAGNESIUM SERPL-MCNC: 1.6 MG/DL (ref 1.6–2.6)
MCH RBC QN AUTO: 30.3 PG (ref 27–31)
MCHC RBC AUTO-ENTMCNC: 31.9 G/DL (ref 32–36)
MCV RBC AUTO: 95 FL (ref 82–98)
MICROSCOPIC COMMENT: ABNORMAL
MONOCYTES # BLD AUTO: 0.9 K/UL (ref 0.3–1)
MONOCYTES NFR BLD: 12.9 % (ref 4–15)
NEUTROPHILS # BLD AUTO: 4.9 K/UL (ref 1.8–7.7)
NEUTROPHILS NFR BLD: 71 % (ref 38–73)
NITRITE UR QL STRIP: NEGATIVE
NRBC BLD-RTO: 0 /100 WBC
PCO2 BLDA: 54.8 MMHG (ref 32–66)
PH SMN: 7.42 [PH] (ref 7.18–7.38)
PH UR STRIP: 7 [PH] (ref 5–8)
PLATELET # BLD AUTO: 236 K/UL (ref 150–450)
PMV BLD AUTO: 10 FL (ref 9.2–12.9)
PO2 BLDA: 54 MMHG (ref 6–31)
POC BE: 11 MMOL/L
POC SATURATED O2: 87 %
POC TCO2: 37 MMOL/L (ref 23–27)
POTASSIUM BLD-SCNC: 4.3 MMOL/L (ref 3.5–5.1)
POTASSIUM SERPL-SCNC: 5.8 MMOL/L (ref 3.5–5.1)
PROT SERPL-MCNC: 6.8 G/DL (ref 6–8.4)
PROT UR QL STRIP: NEGATIVE
RBC # BLD AUTO: 3.6 M/UL (ref 4–5.4)
RBC #/AREA URNS AUTO: 10 /HPF (ref 0–4)
SAMPLE: ABNORMAL
SITE: ABNORMAL
SODIUM SERPL-SCNC: 128 MMOL/L (ref 136–145)
SP GR UR STRIP: 1.01 (ref 1–1.03)
SQUAMOUS #/AREA URNS AUTO: 0 /HPF
TROPONIN I SERPL DL<=0.01 NG/ML-MCNC: <0.006 NG/ML (ref 0–0.03)
TROPONIN I SERPL DL<=0.01 NG/ML-MCNC: <0.006 NG/ML (ref 0–0.03)
URN SPEC COLLECT METH UR: ABNORMAL
WBC # BLD AUTO: 6.97 K/UL (ref 3.9–12.7)
WBC #/AREA URNS AUTO: 4 /HPF (ref 0–5)

## 2023-09-21 PROCEDURE — 81001 URINALYSIS AUTO W/SCOPE: CPT

## 2023-09-21 PROCEDURE — 99285 EMERGENCY DEPT VISIT HI MDM: CPT | Mod: 25

## 2023-09-21 PROCEDURE — 80053 COMPREHEN METABOLIC PANEL: CPT

## 2023-09-21 PROCEDURE — 63600175 PHARM REV CODE 636 W HCPCS: Performed by: INTERNAL MEDICINE

## 2023-09-21 PROCEDURE — 93005 ELECTROCARDIOGRAM TRACING: CPT

## 2023-09-21 PROCEDURE — 99900035 HC TECH TIME PER 15 MIN (STAT)

## 2023-09-21 PROCEDURE — 84132 ASSAY OF SERUM POTASSIUM: CPT

## 2023-09-21 PROCEDURE — 84484 ASSAY OF TROPONIN QUANT: CPT | Mod: 91

## 2023-09-21 PROCEDURE — 27000221 HC OXYGEN, UP TO 24 HOURS

## 2023-09-21 PROCEDURE — 93010 ELECTROCARDIOGRAM REPORT: CPT | Mod: ,,, | Performed by: INTERNAL MEDICINE

## 2023-09-21 PROCEDURE — 83036 HEMOGLOBIN GLYCOSYLATED A1C: CPT | Performed by: INTERNAL MEDICINE

## 2023-09-21 PROCEDURE — 82803 BLOOD GASES ANY COMBINATION: CPT

## 2023-09-21 PROCEDURE — 11000001 HC ACUTE MED/SURG PRIVATE ROOM

## 2023-09-21 PROCEDURE — 25000242 PHARM REV CODE 250 ALT 637 W/ HCPCS

## 2023-09-21 PROCEDURE — 83880 ASSAY OF NATRIURETIC PEPTIDE: CPT

## 2023-09-21 PROCEDURE — 93010 EKG 12-LEAD: ICD-10-PCS | Mod: ,,, | Performed by: INTERNAL MEDICINE

## 2023-09-21 PROCEDURE — 87389 HIV-1 AG W/HIV-1&-2 AB AG IA: CPT | Performed by: PHYSICIAN ASSISTANT

## 2023-09-21 PROCEDURE — 21400001 HC TELEMETRY ROOM

## 2023-09-21 PROCEDURE — 83735 ASSAY OF MAGNESIUM: CPT | Performed by: INTERNAL MEDICINE

## 2023-09-21 PROCEDURE — 85025 COMPLETE CBC W/AUTO DIFF WBC: CPT

## 2023-09-21 PROCEDURE — 96374 THER/PROPH/DIAG INJ IV PUSH: CPT

## 2023-09-21 PROCEDURE — 94640 AIRWAY INHALATION TREATMENT: CPT

## 2023-09-21 PROCEDURE — 94761 N-INVAS EAR/PLS OXIMETRY MLT: CPT

## 2023-09-21 PROCEDURE — 25000003 PHARM REV CODE 250: Performed by: INTERNAL MEDICINE

## 2023-09-21 PROCEDURE — 63600175 PHARM REV CODE 636 W HCPCS

## 2023-09-21 PROCEDURE — 86803 HEPATITIS C AB TEST: CPT | Performed by: PHYSICIAN ASSISTANT

## 2023-09-21 RX ORDER — CARVEDILOL 12.5 MG/1
12.5 TABLET ORAL 2 TIMES DAILY
Status: DISCONTINUED | OUTPATIENT
Start: 2023-09-21 | End: 2023-09-26 | Stop reason: HOSPADM

## 2023-09-21 RX ORDER — NITROFURANTOIN 25; 75 MG/1; MG/1
100 CAPSULE ORAL EVERY 12 HOURS
Status: DISCONTINUED | OUTPATIENT
Start: 2023-09-21 | End: 2023-09-22

## 2023-09-21 RX ORDER — OXYBUTYNIN CHLORIDE 5 MG/1
5 TABLET, EXTENDED RELEASE ORAL EVERY OTHER DAY
Status: DISCONTINUED | OUTPATIENT
Start: 2023-09-22 | End: 2023-09-26 | Stop reason: HOSPADM

## 2023-09-21 RX ORDER — AMOXICILLIN 250 MG
1 CAPSULE ORAL 2 TIMES DAILY
Status: DISCONTINUED | OUTPATIENT
Start: 2023-09-21 | End: 2023-09-26 | Stop reason: HOSPADM

## 2023-09-21 RX ORDER — LOSARTAN POTASSIUM 25 MG/1
25 TABLET ORAL DAILY
Status: DISCONTINUED | OUTPATIENT
Start: 2023-09-22 | End: 2023-09-26 | Stop reason: HOSPADM

## 2023-09-21 RX ORDER — DILTIAZEM HYDROCHLORIDE 240 MG/1
240 CAPSULE, COATED, EXTENDED RELEASE ORAL DAILY
Status: DISCONTINUED | OUTPATIENT
Start: 2023-09-22 | End: 2023-09-26 | Stop reason: HOSPADM

## 2023-09-21 RX ORDER — ISOSORBIDE MONONITRATE 60 MG/1
60 TABLET, EXTENDED RELEASE ORAL DAILY
Status: DISCONTINUED | OUTPATIENT
Start: 2023-09-22 | End: 2023-09-26 | Stop reason: HOSPADM

## 2023-09-21 RX ORDER — LORAZEPAM 0.5 MG/1
0.5 TABLET ORAL EVERY MORNING
Status: DISCONTINUED | OUTPATIENT
Start: 2023-09-22 | End: 2023-09-26 | Stop reason: HOSPADM

## 2023-09-21 RX ORDER — ONDANSETRON 2 MG/ML
4 INJECTION INTRAMUSCULAR; INTRAVENOUS EVERY 8 HOURS PRN
Status: DISCONTINUED | OUTPATIENT
Start: 2023-09-21 | End: 2023-09-26 | Stop reason: HOSPADM

## 2023-09-21 RX ORDER — SODIUM CHLORIDE 0.9 % (FLUSH) 0.9 %
10 SYRINGE (ML) INJECTION
Status: DISCONTINUED | OUTPATIENT
Start: 2023-09-21 | End: 2023-09-26 | Stop reason: HOSPADM

## 2023-09-21 RX ORDER — NITROGLYCERIN 0.4 MG/1
0.4 TABLET SUBLINGUAL EVERY 5 MIN PRN
Status: DISCONTINUED | OUTPATIENT
Start: 2023-09-21 | End: 2023-09-26 | Stop reason: HOSPADM

## 2023-09-21 RX ORDER — GABAPENTIN 600 MG/1
600 TABLET ORAL 3 TIMES DAILY
COMMUNITY

## 2023-09-21 RX ORDER — IBUPROFEN 200 MG
24 TABLET ORAL
Status: DISCONTINUED | OUTPATIENT
Start: 2023-09-21 | End: 2023-09-26 | Stop reason: HOSPADM

## 2023-09-21 RX ORDER — FLUTICASONE FUROATE AND VILANTEROL 100; 25 UG/1; UG/1
1 POWDER RESPIRATORY (INHALATION) DAILY
Status: DISCONTINUED | OUTPATIENT
Start: 2023-09-22 | End: 2023-09-26 | Stop reason: HOSPADM

## 2023-09-21 RX ORDER — RANOLAZINE 500 MG/1
1000 TABLET, EXTENDED RELEASE ORAL 2 TIMES DAILY
Status: DISCONTINUED | OUTPATIENT
Start: 2023-09-21 | End: 2023-09-26 | Stop reason: HOSPADM

## 2023-09-21 RX ORDER — FUROSEMIDE 10 MG/ML
40 INJECTION INTRAMUSCULAR; INTRAVENOUS
Status: DISCONTINUED | OUTPATIENT
Start: 2023-09-21 | End: 2023-09-26 | Stop reason: HOSPADM

## 2023-09-21 RX ORDER — ATORVASTATIN CALCIUM 10 MG/1
10 TABLET, FILM COATED ORAL DAILY
Status: DISCONTINUED | OUTPATIENT
Start: 2023-09-22 | End: 2023-09-26 | Stop reason: HOSPADM

## 2023-09-21 RX ORDER — IBUPROFEN 200 MG
16 TABLET ORAL
Status: DISCONTINUED | OUTPATIENT
Start: 2023-09-21 | End: 2023-09-26 | Stop reason: HOSPADM

## 2023-09-21 RX ORDER — NALOXONE HCL 0.4 MG/ML
0.02 VIAL (ML) INJECTION
Status: DISCONTINUED | OUTPATIENT
Start: 2023-09-21 | End: 2023-09-26 | Stop reason: HOSPADM

## 2023-09-21 RX ORDER — GABAPENTIN 300 MG/1
600 CAPSULE ORAL 3 TIMES DAILY
Status: DISCONTINUED | OUTPATIENT
Start: 2023-09-21 | End: 2023-09-26 | Stop reason: HOSPADM

## 2023-09-21 RX ORDER — CETIRIZINE HYDROCHLORIDE 10 MG/1
10 TABLET ORAL DAILY
Status: DISCONTINUED | OUTPATIENT
Start: 2023-09-22 | End: 2023-09-26 | Stop reason: HOSPADM

## 2023-09-21 RX ORDER — FUROSEMIDE 10 MG/ML
40 INJECTION INTRAMUSCULAR; INTRAVENOUS
Status: COMPLETED | OUTPATIENT
Start: 2023-09-21 | End: 2023-09-21

## 2023-09-21 RX ORDER — FEXOFENADINE HCL 60 MG
60 TABLET ORAL DAILY
COMMUNITY

## 2023-09-21 RX ORDER — INSULIN ASPART 100 [IU]/ML
0-5 INJECTION, SOLUTION INTRAVENOUS; SUBCUTANEOUS
Status: DISCONTINUED | OUTPATIENT
Start: 2023-09-21 | End: 2023-09-26 | Stop reason: HOSPADM

## 2023-09-21 RX ORDER — MAG HYDROX/ALUMINUM HYD/SIMETH 200-200-20
30 SUSPENSION, ORAL (FINAL DOSE FORM) ORAL 4 TIMES DAILY PRN
Status: DISCONTINUED | OUTPATIENT
Start: 2023-09-21 | End: 2023-09-26 | Stop reason: HOSPADM

## 2023-09-21 RX ORDER — SIMETHICONE 80 MG
1 TABLET,CHEWABLE ORAL 4 TIMES DAILY PRN
Status: DISCONTINUED | OUTPATIENT
Start: 2023-09-21 | End: 2023-09-26 | Stop reason: HOSPADM

## 2023-09-21 RX ORDER — CLOPIDOGREL BISULFATE 75 MG/1
75 TABLET ORAL DAILY
Status: DISCONTINUED | OUTPATIENT
Start: 2023-09-22 | End: 2023-09-26 | Stop reason: HOSPADM

## 2023-09-21 RX ORDER — IPRATROPIUM BROMIDE AND ALBUTEROL SULFATE 2.5; .5 MG/3ML; MG/3ML
3 SOLUTION RESPIRATORY (INHALATION) EVERY 6 HOURS PRN
Status: DISCONTINUED | OUTPATIENT
Start: 2023-09-21 | End: 2023-09-26 | Stop reason: HOSPADM

## 2023-09-21 RX ORDER — IPRATROPIUM BROMIDE AND ALBUTEROL SULFATE 2.5; .5 MG/3ML; MG/3ML
3 SOLUTION RESPIRATORY (INHALATION)
Status: COMPLETED | OUTPATIENT
Start: 2023-09-21 | End: 2023-09-21

## 2023-09-21 RX ORDER — TALC
6 POWDER (GRAM) TOPICAL NIGHTLY PRN
Status: DISCONTINUED | OUTPATIENT
Start: 2023-09-21 | End: 2023-09-26 | Stop reason: HOSPADM

## 2023-09-21 RX ORDER — NITROFURANTOIN 25; 75 MG/1; MG/1
CAPSULE ORAL
Status: ON HOLD | COMMUNITY
End: 2023-09-26 | Stop reason: HOSPADM

## 2023-09-21 RX ORDER — MONTELUKAST SODIUM 10 MG/1
10 TABLET ORAL DAILY
Status: DISCONTINUED | OUTPATIENT
Start: 2023-09-22 | End: 2023-09-26 | Stop reason: HOSPADM

## 2023-09-21 RX ORDER — GLUCAGON 1 MG
1 KIT INJECTION
Status: DISCONTINUED | OUTPATIENT
Start: 2023-09-21 | End: 2023-09-26 | Stop reason: HOSPADM

## 2023-09-21 RX ORDER — ACETAMINOPHEN 325 MG/1
650 TABLET ORAL EVERY 4 HOURS PRN
Status: DISCONTINUED | OUTPATIENT
Start: 2023-09-21 | End: 2023-09-26 | Stop reason: HOSPADM

## 2023-09-21 RX ORDER — PANTOPRAZOLE SODIUM 40 MG/1
40 TABLET, DELAYED RELEASE ORAL DAILY
Status: DISCONTINUED | OUTPATIENT
Start: 2023-09-22 | End: 2023-09-26 | Stop reason: HOSPADM

## 2023-09-21 RX ORDER — DULOXETIN HYDROCHLORIDE 20 MG/1
20 CAPSULE, DELAYED RELEASE ORAL DAILY
Status: DISCONTINUED | OUTPATIENT
Start: 2023-09-22 | End: 2023-09-26 | Stop reason: HOSPADM

## 2023-09-21 RX ORDER — SODIUM CHLORIDE 0.9 % (FLUSH) 0.9 %
10 SYRINGE (ML) INJECTION EVERY 12 HOURS PRN
Status: DISCONTINUED | OUTPATIENT
Start: 2023-09-21 | End: 2023-09-26 | Stop reason: HOSPADM

## 2023-09-21 RX ADMIN — NITROFURANTOIN MONOHYDRATE/MACROCRYSTALS 100 MG: 25; 75 CAPSULE ORAL at 10:09

## 2023-09-21 RX ADMIN — RANOLAZINE 1000 MG: 500 TABLET, EXTENDED RELEASE ORAL at 10:09

## 2023-09-21 RX ADMIN — CARVEDILOL 12.5 MG: 12.5 TABLET, FILM COATED ORAL at 10:09

## 2023-09-21 RX ADMIN — SENNOSIDES AND DOCUSATE SODIUM 1 TABLET: 50; 8.6 TABLET ORAL at 10:09

## 2023-09-21 RX ADMIN — FUROSEMIDE 40 MG: 10 INJECTION, SOLUTION INTRAVENOUS at 12:09

## 2023-09-21 RX ADMIN — APIXABAN 5 MG: 5 TABLET, FILM COATED ORAL at 10:09

## 2023-09-21 RX ADMIN — FUROSEMIDE 40 MG: 10 INJECTION, SOLUTION INTRAVENOUS at 05:09

## 2023-09-21 RX ADMIN — IPRATROPIUM BROMIDE AND ALBUTEROL SULFATE 3 ML: .5; 3 SOLUTION RESPIRATORY (INHALATION) at 02:09

## 2023-09-21 RX ADMIN — GABAPENTIN 600 MG: 300 CAPSULE ORAL at 10:09

## 2023-09-21 NOTE — HPI
78F with PMH of COPD, HFpEF, Afib with PPM on eliquis, HTN, DM2, mesenteric ischemia who presents with c/o SOB since last night. SOB occurs while at rest and worse with exertion. She also noticed chest heaviness/tightness this AM. No known alleviating factors. Denies palpitations, diaphoresis, fever, chills, cough but has noticed mild wheezing. Not sure if there's been weight gain or swelling. No orthopnea, PND. No sick contacts. She is compliant with her meds, including lasix, but does admit to poor adherence to cardiac diet. She was started on macrobid on monday for UTI. Workup in ED remarkable for tachypnea/hypoxia requiring 2L NC, Hb 10.9, Na 128, Cl 89, , Trop neg, CXR with no acute findings. She received dose of iv lasix 40mg in ED. Will be admitted to hospital medicine service for further management.

## 2023-09-21 NOTE — ASSESSMENT & PLAN NOTE
"Patient's FSGs are controlled on current medication regimen.  Last A1c reviewed-   Lab Results   Component Value Date    HGBA1C 5.3 09/21/2023     Most recent fingerstick glucose reviewed- No results for input(s): "POCTGLUCOSE" in the last 24 hours.  Current correctional scale  Low  Maintain anti-hyperglycemic dose as follows-   Antihyperglycemics (From admission, onward)    Start     Stop Route Frequency Ordered    09/21/23 1705  insulin aspart U-100 pen 0-5 Units         -- SubQ Before meals & nightly PRN 09/21/23 1606        Hold Oral hypoglycemics while patient is in the hospital.  "

## 2023-09-21 NOTE — ASSESSMENT & PLAN NOTE
Patient is identified as having Diastolic (HFpEF) heart failure that is Acute on chronic. CHF is currently uncontrolled due to Continued edema of extremities and JVD and Rales/crackles on pulmonary exam. Latest ECHO performed and demonstrates- Results for orders placed during the hospital encounter of 09/30/22    Echo    Interpretation Summary  · The left ventricle is normal in size with concentric hypertrophy and normal systolic function.  · The estimated ejection fraction is 55%.  · Indeterminate left ventricular diastolic function.  · Normal right ventricular size with normal right ventricular systolic function.  · Mild left atrial enlargement.  · There is abnormal septal wall motion consistent with right ventricular pacemaker.  · Mild to moderate tricuspid regurgitation.  · There is a 26 mm Edward S3 transcutaneously-placed aortic bioprosthesis present. There is no aortic insufficiency present. Prosthetic aortic valve is normal.  · The aortic valve mean gradient is 7 mmHg with a dimensionless index of 0.51.  · Intermediate central venous pressure (8 mmHg).  · The estimated PA systolic pressure is 47 mmHg.  · There is pulmonary hypertension.  . Continue Beta Blocker, ACE/ARB and Furosemide and monitor clinical status closely. Monitor on telemetry. Patient is on CHF pathway.  Monitor strict Is&Os and daily weights.  Place on fluid restriction of 1.5 L. Cardiology has not been any consulted. Continue to stress to patient importance of self efficacy and  on diet for CHF. Last BNP reviewed- and noted below   Recent Labs   Lab 09/21/23  1250   *   .  Aortic stenosis s/p Porcine TAVR (on plavix)  - cont home meds coreg, diltiazem, imdur, ranolazine  - given 40mg IV lasix, lasix 40mg iv bid, strict I&Os, daily weights  - hypoxic in ED, placed on 2L NC with improvement of SOB/tachypnea  - , negative troponin  - ordered TTE

## 2023-09-21 NOTE — ED NOTES
900 mls of urine emptied. Pt lying bed. Respirations even unlabored. No acute distress noted at this time but will continue to monitor.  at bedside. Side rails up x2. Call light within reach.

## 2023-09-21 NOTE — PHARMACY MED REC
"Admission Medication History     The home medication history was taken by Karen Avitia.    You may go to "Admission" then "Reconcile Home Medications" tabs to review and/or act upon these items.     The home medication list has been updated by the Pharmacy department.   Please read ALL comments highlighted in yellow.   Please address this information as you see fit.    Feel free to contact us if you have any questions or require assistance.          Medications listed below were obtained from: Patient/family  Current Outpatient Medications on File Prior to Encounter   Medication Sig    acetaminophen (TYLENOL) 650 MG TbSR Take 1,300 mg by mouth 2 (two) times daily as needed.    albuterol-ipratropium (DUO-NEB) 2.5 mg-0.5 mg/3 mL nebulizer solution Take 3 mLs by nebulization every 4 (four) hours as needed for Wheezing or Shortness of Breath.    apixaban (ELIQUIS) 5 mg Tab TAKE 1 TABLET BY MOUTH TWICE A DAY    ascorbic acid, vitamin C, (VITAMIN C) 500 MG tablet Take 1,000 mg by mouth once daily.     atorvastatin (LIPITOR) 10 MG tablet Take 1 tablet (10 mg total) by mouth once daily.    calcium carbonate/vitamin D3 (CALTRATE 600 + D ORAL) Take 1 tablet by mouth once daily.    carvediloL (COREG) 12.5 MG tablet Take 1 tablet (12.5 mg total) by mouth 2 (two) times daily.    clopidogreL (PLAVIX) 75 mg tablet TAKE 1 TABLET BY MOUTH EVERY DAY    diltiaZEM (CARDIZEM CD) 240 MG 24 hr capsule TAKE 1 CAPSULE BY MOUTH ONCE A DAY    DULoxetine (CYMBALTA) 20 MG capsule Take 1 capsule (20 mg total) by mouth once daily.    fexofenadine (ALLEGRA) 60 MG tablet Take 60 mg by mouth once daily.    fish oil-omega-3 fatty acids 300-1,000 mg capsule Take 1 g by mouth once daily.     furosemide (LASIX) 40 MG tablet Take 1 tablet (40 mg total) by mouth 2 (two) times a day.    gabapentin (NEURONTIN) 600 MG tablet Take 600 mg by mouth 3 (three) times daily.    isosorbide mononitrate (IMDUR) 60 MG 24 hr tablet Take 1 tablet (60 mg total) by mouth " once daily.    LORazepam (ATIVAN) 0.5 MG tablet Take 1 tablet (0.5 mg total) by mouth every morning.    losartan (COZAAR) 25 MG tablet Take 1 tablet (25 mg total) by mouth once daily.    metFORMIN (GLUCOPHAGE) 500 MG tablet Take 1 tablet (500 mg total) by mouth 2 (two) times daily.    montelukast (SINGULAIR) 10 mg tablet Take 10 mg by mouth once daily.    nitrofurantoin, macrocrystal-monohydrate, (MACROBID) 100 MG capsule Take 1 capsule by mouth twice daily for 7 days    pantoprazole (PROTONIX) 40 MG tablet Take 1 tablet (40 mg total) by mouth once daily.    phenazopyridine HCl (PYRIDIUM ORAL) Take 1 tablet by mouth 3 (three) times daily as needed.    POLYSORBATE 80/GLYCERIN (REFRESH DRY EYE THERAPY OPHT) Apply to eye 2 (two) times daily.    potassium gluconate 550 mg (90 mg) Tab Take 180 mg by mouth 2 (two) times a day.    ranolazine (RANEXA) 1,000 mg Tb12 Take 1 tablet (1,000 mg total) by mouth 2 (two) times daily.    SYMBICORT 160-4.5 mcg/actuation HFAA Inhale 1 puff into the lungs 2 (two) times daily.    vitamin D (VITAMIN D3) 1000 units Tab Take 2,000 Units by mouth once daily.    ZINC ORAL Take 1 tablet by mouth once daily.    mv-mn/folic ac/calcium/vit K1 (WOMEN'S 50 PLUS MULTIVITAMIN ORAL) Take 1 tablet by mouth once daily.    nitroGLYCERIN (NITROSTAT) 0.4 MG SL tablet Place 1 tablet (0.4 mg total) under the tongue every 5 (five) minutes as needed for Chest pain.    oxybutynin (DITROPAN-XL) 5 MG TR24 Take 1 tablet (5 mg total) by mouth every other day. PATIENT RAN OUT OF MEDICATION. REFILL NEEDED.                Karen Avitia  EXT [id number]                  .

## 2023-09-21 NOTE — ED PROVIDER NOTES
Encounter Date: 9/21/2023       History     Chief Complaint   Patient presents with    Shortness of Breath     Pt c/o SOB, worsens with exertion.  Hx CHF     Mrs. Strong is a 78 year old woman with COPD, HFpEF/Afib with pacemaker, HTN, T2DM, mesenteric ischemia who presents today for shortness of breath since last night. Her SOB is present at rest but worsens with exertion. It is associated with chest heaviness/tightness that is constant and does not radiate, dry cough without other URI/infectious symptoms, wheezing, headaches, orthopnea, generalized weakness. She is unable to tell whether her extremities are more swollen than usual or not. She feels that her current symptoms are different than when she experiences COPD exacerbations. She was prescribed nitrofurantoin on Monday for UTI which she did not take today unless this was the cause of her current symptoms. She continues to have dysuria today. She denies any other concerns/complaints at this time.        Review of patient's allergies indicates:   Allergen Reactions    Celebrex [celecoxib] Shortness Of Breath    Ciprofloxacin Swelling     lip    Dicyclomine Hives    Adhesive Dermatitis    Avelox [moxifloxacin] Swelling    Cilostazol Other (See Comments)     Elevates blood pressure    Eryc [erythromycin] Other (See Comments)     unknown    Iodine and iodide containing products Hives    Keflex [cephalexin] Hives    Meclomen      rashes    Meloxicam      Ear ringing    Metoclopramide Other (See Comments)     High blood pressure    Sulfa (sulfonamide antibiotics) Itching     Past Medical History:   Diagnosis Date    Acute on chronic diastolic (congestive) heart failure 11/20/2019    Anticoagulant long-term use     on Plavix since May 2015    Anxiety     Arthritis     Asthma     Atrial fibrillation     Atrial fibrillation with RVR 10/19/2020    Cataracts, bilateral     Chest pain, atypical     Chronic atrial fibrillation with rapid ventricular response 6/23/2017     Colon polyp     Coronary artery disease     Diabetes mellitus     Dry eyes     Esophageal erosions     GERD (gastroesophageal reflux disease)     Heart murmur     Hemorrhoid     High cholesterol     Hypertension     Irritable bowel syndrome     Paroxysmal atrial fibrillation 10/30/2020    Persistent atrial fibrillation 2/10/2020    Shingles 2015    Stenosis of aortic and mitral valves     Stroke     TIA (transient ischemic attack)     Use of cane as ambulatory aid      Past Surgical History:   Procedure Laterality Date    ABDOMINAL SURGERY      stents to SMA    angiocele      2007, 2014    AORTIC VALVE REPLACEMENT N/A 11/19/2019    Procedure: Replacement-valve-aortic;  Surgeon: Jack Capone MD;  Location: [facility] CATH LAB;  Service: Cardiology;  Laterality: N/A;    BREAST SURGERY      left--- a lump--- no cancer    CARDIAC VALVE SURGERY      COLONOSCOPY  2014    COLONOSCOPY N/A 3/14/2018    Procedure: COLONOSCOPY;  Surgeon: Efren Kemp MD;  Location: [facility] ENDO ([address] [place] [address]);  Service: Endoscopy;  Laterality: N/A;  ok to hold Plavix 5 days prior to procedure per Dr RESHMA Hernandez     ok per Dr Kemp to hold Eliquis 2 days prior to procedure (see telephone encounter dated-2/7/18)    HERNIA REPAIR      HYSTERECTOMY  partial    1982 partial hysterectomy    LEFT HEART CATHETERIZATION Right 11/5/2019    Procedure: Left heart cath;  Surgeon: Jack Capone MD;  Location: [facility] CATH LAB;  Service: Cardiology;  Laterality: Right;    left nasal polyp      left neck lymph node      nose polyp      right hip fatty mass tissue      stent to small intestine      SUPERIOR VENA CAVA ANGIOPLASTY / STENTING      TONSILLECTOMY      TOTAL ABDOMINAL HYSTERECTOMY      2014    TOTAL KNEE ARTHROPLASTY Bilateral     TREATMENT OF CARDIAC ARRHYTHMIA N/A 2/10/2020    Procedure: CARDIOVERSION;  Surgeon: Jayy Parrish MD;  Location: [facility] EP LAB;  Service: Cardiology;  Laterality: N/A;  AF, PEDRO, DCCV, MAC, DM, 3 Prep    TREATMENT OF CARDIAC ARRHYTHMIA  N/A 5/15/2020    Procedure: CARDIOVERSION;  Surgeon: Hany Bee MD;  Location: [facility] EP LAB;  Service: Cardiology;  Laterality: N/A;  AF, PEDRO, DCCV, MAC, DM, 3 Prep    UPPER GASTROINTESTINAL ENDOSCOPY  2014     Family History   Problem Relation Age of Onset    Heart attack Mother     Stroke Father     Heart failure Brother     Colon cancer Neg Hx     Inflammatory bowel disease Neg Hx      Social History     Tobacco Use    Smoking status: Never    Smokeless tobacco: Never   Substance Use Topics    Alcohol use: No    Drug use: No     Review of Systems   Constitutional:  Positive for activity change and appetite change. Negative for fever.   HENT:  Negative for congestion and sore throat.    Respiratory:  Positive for cough, chest tightness, shortness of breath and wheezing.    Cardiovascular:  Positive for chest pain and leg swelling. Negative for palpitations.   Gastrointestinal:  Negative for abdominal distention, abdominal pain, constipation, diarrhea, nausea and vomiting.   Genitourinary:  Positive for dysuria.   Neurological:  Positive for weakness (generalized) and headaches.   Psychiatric/Behavioral:  Negative for behavioral problems and confusion.        Physical Exam     Initial Vitals [09/21/23 1158]   BP Pulse Resp Temp SpO2   134/63 69 (!) 24 97.9 °F (36.6 °C) (!) 90 %      MAP       --         Physical Exam    Vitals reviewed.  Constitutional: She appears well-nourished. She is not diaphoretic. No distress.   HENT:   Head: Normocephalic and atraumatic.   Cardiovascular:  Normal rate and regular rhythm.           Paced rhythm    Pulmonary/Chest: No respiratory distress. She has wheezes (Audible end espiratory wheezing without stethoscope).   Abdominal: Abdomen is soft. She exhibits no distension. There is no abdominal tenderness.   Musculoskeletal:         General: Edema (2+ pitting edema bilateral lower extremities) present.     Neurological: She is alert and oriented to person, place, and time.    Skin: Skin is warm and dry.   Psychiatric: She has a normal mood and affect. Her behavior is normal. Judgment and thought content normal.         ED Course   Procedures  Labs Reviewed   CBC W/ AUTO DIFFERENTIAL - Abnormal; Notable for the following components:       Result Value    RBC 3.60 (*)     Hemoglobin 10.9 (*)     Hematocrit 34.2 (*)     MCHC 31.9 (*)     Immature Granulocytes 0.7 (*)     Immature Grans (Abs) 0.05 (*)     Lymph % 14.9 (*)     All other components within normal limits   COMPREHENSIVE METABOLIC PANEL - Abnormal; Notable for the following components:    Sodium 128 (*)     Potassium 5.8 (*)     Chloride 89 (*)     Albumin 3.2 (*)     Alkaline Phosphatase 38 (*)     AST 46 (*)     All other components within normal limits   B-TYPE NATRIURETIC PEPTIDE - Abnormal; Notable for the following components:     (*)     All other components within normal limits   URINALYSIS, REFLEX TO URINE CULTURE - Abnormal; Notable for the following components:    Occult Blood UA 1+ (*)     All other components within normal limits    Narrative:     Specimen Source->Urine   URINALYSIS MICROSCOPIC - Abnormal; Notable for the following components:    RBC, UA 10 (*)     All other components within normal limits    Narrative:     Specimen Source->Urine   ISTAT PROCEDURE - Abnormal; Notable for the following components:    POC PH 7.416 (*)     POC PO2 54 (*)     POC HCO3 35.3 (*)     POC TCO2 37 (*)     All other components within normal limits   HIV 1 / 2 ANTIBODY    Narrative:     Release to patient->Immediate   HEPATITIS C ANTIBODY    Narrative:     Release to patient->Immediate   TROPONIN I   TROPONIN I   POCT TROPONIN   POCT TROPONIN          Imaging Results              X-Ray Chest AP Portable (Final result)  Result time 09/21/23 15:18:06      Final result by Mariano Worthy MD (09/21/23 15:18:06)                   Impression:      Stable examination.      Electronically signed by: Mariano Worthy  MD  Date:    09/21/2023  Time:    15:18               Narrative:    EXAMINATION:  XR CHEST AP PORTABLE    CLINICAL HISTORY:  Chest Pain;    TECHNIQUE:  Single frontal view of the chest was performed.    COMPARISON:  08/11/2023.    FINDINGS:  There is unchanged appearance of the left-sided AICD.  There also changes of endovascular repair of the thoracic aorta.  Monitoring EKG leads are present.    The trachea is unremarkable.  There are calcifications of the aortic knob.  The cardiomediastinal silhouette is enlarged.  There is no evidence of free air beneath the hemidiaphragms.  There are no pleural effusions.  There is no evidence of a pneumothorax.  There is no evidence of pneumomediastinum.  The aeration of the lung fields are unchanged.  There is no new airspace disease.  There are degenerative changes in the osseous structures.                                       Medications   furosemide injection 40 mg (40 mg Intravenous Given 9/21/23 1247)   albuterol-ipratropium 2.5 mg-0.5 mg/3 mL nebulizer solution 3 mL (3 mLs Nebulization Given 9/21/23 1406)     Medical Decision Making  78 year old woman with COPD, HFpEF/Afib with pacemaker, HTN, T2DM, mesenteric ischemia who presents today for shortness of breath since last night, chest heaviness/tightness that is constant and does not radiate, dry cough without other URI/infectious symptoms, wheezing, headaches, orthopnea, generalized weakness. Differential includes but is not limited to ACS, acute heart failure decompensation, COPD exacerbation, Infectious, PE. Patient with low O2sat (87%) and end expiratory wheezing in the ED, placed on NC. BNP elevated, troponin WNL. Hyponatremic/hypochloremic in CMP. Potassium initially elevated on CMP with hemolysis, repeat 4.3. No leukocytosis. PE less likely due to symptomology, she is not tachycardic and she is on blood thinners. Infectious less likely cause of SOB without systemic infectious symptoms and lack of leukocytosis.  ACS less likely with no acute changes on EKG and negative trop. COPD exacerbation less likely due to normal CO2 on VBG. Most likely heart failure decompensation due to elevated BNP, edema, orthopnea and SOB. CXR without acute process but showing enlarged cardiac silhouette. Upon reassessment patient has 600cc UOP. When O2 removed, she desatted to 87%. Admit to hospital medicine.    Amount and/or Complexity of Data Reviewed  Labs: ordered.  Radiology: ordered.    Risk  Prescription drug management.  Decision regarding hospitalization.                               Clinical Impression:   Final diagnoses:  [R07.9] Chest pain  [R06.02] SOB (shortness of breath) (Primary)  [I50.9] Acute on chronic congestive heart failure, unspecified heart failure type        ED Disposition Condition    Admit Stable                Jannette Garcia MD  Resident  09/21/23 9706

## 2023-09-21 NOTE — H&P
[facility] - Emergency Dept  Hospital Medicine  History & Physical    Patient Name: Adriana Strong  MRN: [id number]  Patient Class: IP- Inpatient  Admission Date: 9/21/2023  Attending Physician: Larry Agee MD   Primary Care Provider: Krzysztof Mcgraw MD         Patient information was obtained from patient and ER records.     Subjective:     Principal Problem:Acute on chronic heart failure with preserved ejection fraction    Chief Complaint:   Chief Complaint   Patient presents with    Shortness of Breath     Pt c/o SOB, worsens with exertion.  Hx CHF        HPI: 78F with PMH of COPD, HFpEF, Afib with PPM on eliquis, HTN, DM2, mesenteric ischemia who presents with c/o SOB since last night. SOB occurs while at rest and worse with exertion. She also noticed chest heaviness/tightness this AM. No known alleviating factors. Denies palpitations, diaphoresis, fever, chills, cough but has noticed mild wheezing. Not sure if there's been weight gain or swelling. No orthopnea, PND. No sick contacts. She is compliant with her meds, including lasix, but does admit to poor adherence to cardiac diet. She was started on macrobid on monday for UTI. Workup in ED remarkable for tachypnea/hypoxia requiring 2L NC, Hb 10.9, Na 128, Cl 89, , Trop neg, CXR with no acute findings. She received dose of iv lasix 40mg in ED. Will be admitted to hospital medicine service for further management.      Past Medical History:   Diagnosis Date    Acute on chronic diastolic (congestive) heart failure 11/20/2019    Anticoagulant long-term use     on Plavix since May 2015    Anxiety     Arthritis     Asthma     Atrial fibrillation     Atrial fibrillation with RVR 10/19/2020    Cataracts, bilateral     Chest pain, atypical     Chronic atrial fibrillation with rapid ventricular response 6/23/2017    Colon polyp     Coronary artery disease     Diabetes mellitus     Dry eyes     Esophageal erosions     GERD (gastroesophageal  reflux disease)     Heart murmur     Hemorrhoid     High cholesterol     Hypertension     Irritable bowel syndrome     Paroxysmal atrial fibrillation 10/30/2020    Persistent atrial fibrillation 2/10/2020    Shingles 2015    Stenosis of aortic and mitral valves     Stroke     TIA (transient ischemic attack)     Use of cane as ambulatory aid        Past Surgical History:   Procedure Laterality Date    ABDOMINAL SURGERY      stents to SMA    angiocele      2007, 2014    AORTIC VALVE REPLACEMENT N/A 11/19/2019    Procedure: Replacement-valve-aortic;  Surgeon: Jack Capone MD;  Location: [facility] CATH LAB;  Service: Cardiology;  Laterality: N/A;    BREAST SURGERY      left--- a lump--- no cancer    CARDIAC VALVE SURGERY      COLONOSCOPY  2014    COLONOSCOPY N/A 3/14/2018    Procedure: COLONOSCOPY;  Surgeon: Efren Kemp MD;  Location: [facility] ENDO ([address] [place] [address]);  Service: Endoscopy;  Laterality: N/A;  ok to hold Plavix 5 days prior to procedure per Dr RESHMA Hernandez     ok per Dr Kemp to hold Eliquis 2 days prior to procedure (see telephone encounter dated-2/7/18)    HERNIA REPAIR      HYSTERECTOMY  partial    1982 partial hysterectomy    LEFT HEART CATHETERIZATION Right 11/5/2019    Procedure: Left heart cath;  Surgeon: Jack Capone MD;  Location: [facility] CATH LAB;  Service: Cardiology;  Laterality: Right;    left nasal polyp      left neck lymph node      nose polyp      right hip fatty mass tissue      stent to small intestine      SUPERIOR VENA CAVA ANGIOPLASTY / STENTING      TONSILLECTOMY      TOTAL ABDOMINAL HYSTERECTOMY      2014    TOTAL KNEE ARTHROPLASTY Bilateral     TREATMENT OF CARDIAC ARRHYTHMIA N/A 2/10/2020    Procedure: CARDIOVERSION;  Surgeon: Jayy Parrish MD;  Location: [facility] EP LAB;  Service: Cardiology;  Laterality: N/A;  AF, PEDRO, DCCV, MAC, DM, 3 Prep    TREATMENT OF CARDIAC ARRHYTHMIA N/A 5/15/2020    Procedure: CARDIOVERSION;  Surgeon: Hany Bee MD;  Location: [facility] EP  LAB;  Service: Cardiology;  Laterality: N/A;  AF, PEDRO, DCCV, MAC, DM, 3 Prep    UPPER GASTROINTESTINAL ENDOSCOPY  2014       Review of patient's allergies indicates:   Allergen Reactions    Celebrex [celecoxib] Shortness Of Breath    Ciprofloxacin Swelling     lip    Dicyclomine Hives    Adhesive Dermatitis    Avelox [moxifloxacin] Swelling    Cilostazol Other (See Comments)     Elevates blood pressure    Eryc [erythromycin] Other (See Comments)     unknown    Iodine and iodide containing products Hives    Keflex [cephalexin] Hives    Meclomen      rashes    Meloxicam      Ear ringing    Metoclopramide Other (See Comments)     High blood pressure    Sulfa (sulfonamide antibiotics) Itching       Current Facility-Administered Medications on File Prior to Encounter   Medication    0.9%  NaCl infusion    sodium chloride 0.9% flush 5 mL     Current Outpatient Medications on File Prior to Encounter   Medication Sig    acetaminophen (TYLENOL) 650 MG TbSR Take 1,300 mg by mouth 2 (two) times daily as needed.    albuterol-ipratropium (DUO-NEB) 2.5 mg-0.5 mg/3 mL nebulizer solution Take 3 mLs by nebulization every 4 (four) hours as needed for Wheezing or Shortness of Breath.    apixaban (ELIQUIS) 5 mg Tab TAKE 1 TABLET BY MOUTH TWICE A DAY    ascorbic acid, vitamin C, (VITAMIN C) 500 MG tablet Take 1,000 mg by mouth once daily.     atorvastatin (LIPITOR) 10 MG tablet Take 1 tablet (10 mg total) by mouth once daily.    calcium carbonate/vitamin D3 (CALTRATE 600 + D ORAL) Take 1 tablet by mouth once daily.    carvediloL (COREG) 12.5 MG tablet Take 1 tablet (12.5 mg total) by mouth 2 (two) times daily.    clopidogreL (PLAVIX) 75 mg tablet TAKE 1 TABLET BY MOUTH EVERY DAY    diltiaZEM (CARDIZEM CD) 240 MG 24 hr capsule TAKE 1 CAPSULE BY MOUTH ONCE A DAY    DULoxetine (CYMBALTA) 20 MG capsule Take 1 capsule (20 mg total) by mouth once daily.    fexofenadine (ALLEGRA) 60 MG tablet Take 60 mg by mouth once  daily.    fish oil-omega-3 fatty acids 300-1,000 mg capsule Take 1 g by mouth once daily.     furosemide (LASIX) 40 MG tablet Take 1 tablet (40 mg total) by mouth 2 (two) times a day.    gabapentin (NEURONTIN) 600 MG tablet Take 600 mg by mouth 3 (three) times daily.    isosorbide mononitrate (IMDUR) 60 MG 24 hr tablet Take 1 tablet (60 mg total) by mouth once daily.    LORazepam (ATIVAN) 0.5 MG tablet Take 1 tablet (0.5 mg total) by mouth every morning.    losartan (COZAAR) 25 MG tablet Take 1 tablet (25 mg total) by mouth once daily.    metFORMIN (GLUCOPHAGE) 500 MG tablet Take 1 tablet (500 mg total) by mouth 2 (two) times daily.    montelukast (SINGULAIR) 10 mg tablet Take 10 mg by mouth once daily.    nitrofurantoin, macrocrystal-monohydrate, (MACROBID) 100 MG capsule Take 1 capsule by mouth twice daily for 7 days    pantoprazole (PROTONIX) 40 MG tablet Take 1 tablet (40 mg total) by mouth once daily.    phenazopyridine HCl (PYRIDIUM ORAL) Take 1 tablet by mouth 3 (three) times daily as needed.    POLYSORBATE 80/GLYCERIN (REFRESH DRY EYE THERAPY OPHT) Apply to eye 2 (two) times daily.    potassium gluconate 550 mg (90 mg) Tab Take 180 mg by mouth 2 (two) times a day.    ranolazine (RANEXA) 1,000 mg Tb12 Take 1 tablet (1,000 mg total) by mouth 2 (two) times daily.    SYMBICORT 160-4.5 mcg/actuation HFAA Inhale 1 puff into the lungs 2 (two) times daily.    vitamin D (VITAMIN D3) 1000 units Tab Take 2,000 Units by mouth once daily.    ZINC ORAL Take 1 tablet by mouth once daily.    mv-mn/folic ac/calcium/vit K1 (WOMEN'S 50 PLUS MULTIVITAMIN ORAL) Take 1 tablet by mouth once daily.    nitroGLYCERIN (NITROSTAT) 0.4 MG SL tablet Place 1 tablet (0.4 mg total) under the tongue every 5 (five) minutes as needed for Chest pain.    oxybutynin (DITROPAN-XL) 5 MG TR24 Take 1 tablet (5 mg total) by mouth every other day.    [DISCONTINUED] fexofenadine (ALLEGRA) 60 MG tablet Take 3 tablets (180 mg total)  by mouth every morning. (Patient taking differently: Take 60 mg by mouth once daily.)    [DISCONTINUED] gabapentin (NEURONTIN) 300 MG capsule Take 2 capsules (600 mg total) by mouth 3 (three) times daily. (Patient taking differently: Take 600 mg by mouth 3 (three) times daily. Patient takes 1 in the am, 1 in the afternoon, and 2 in the evening.)     Family History       Problem Relation (Age of Onset)    Heart attack Mother    Heart failure Brother    Stroke Father          Tobacco Use    Smoking status: Never    Smokeless tobacco: Never   Substance and Sexual Activity    Alcohol use: No    Drug use: No    Sexual activity: Not Currently     Review of Systems   Constitutional:  Negative for activity change, appetite change, chills, diaphoresis, fatigue and fever.   HENT:  Negative for congestion, postnasal drip, rhinorrhea, sinus pressure, sinus pain and sneezing.    Respiratory:  Positive for shortness of breath. Negative for cough, chest tightness, wheezing and stridor.    Cardiovascular:  Positive for chest pain. Negative for palpitations and leg swelling.   Gastrointestinal:  Negative for abdominal distention, abdominal pain, blood in stool, constipation, diarrhea and nausea.   Endocrine: Negative for cold intolerance and heat intolerance.   Genitourinary:  Negative for dysuria, flank pain and hematuria.   Musculoskeletal:  Negative for gait problem.   Neurological:  Negative for dizziness and weakness.   Psychiatric/Behavioral:  Negative for agitation and behavioral problems.    Objective:     Vital Signs (Most Recent):  Temp: 97.9 °F (36.6 °C) (09/21/23 1158)  Pulse: 69 (09/21/23 1406)  Resp: (!) 26 (09/21/23 1406)  BP: 134/63 (09/21/23 1158)  SpO2: 97 % (09/21/23 1406) Vital Signs (24h Range):  Temp:  [97.9 °F (36.6 °C)] 97.9 °F (36.6 °C)  Pulse:  [69] 69  Resp:  [24-26] 26  SpO2:  [90 %-97 %] 97 %  BP: (134)/(63) 134/63     Weight: 115.7 kg (255 lb)  Body mass index is 43.77 kg/m².     Physical  Exam  Constitutional:       General: She is not in acute distress.     Appearance: She is well-developed. She is not ill-appearing or toxic-appearing.   HENT:      Head: Normocephalic and atraumatic.   Eyes:      Pupils: Pupils are equal, round, and reactive to light.   Neck:      Vascular: No JVD.      Comments: +JVD  Cardiovascular:      Rate and Rhythm: Normal rate and regular rhythm.      Heart sounds: Normal heart sounds. No murmur heard.     No friction rub. No gallop.   Pulmonary:      Effort: Pulmonary effort is normal. No respiratory distress.      Breath sounds: No stridor. Rales (bibasilar) present. No wheezing.   Abdominal:      General: Bowel sounds are normal. There is no distension.      Palpations: Abdomen is soft.      Tenderness: There is no abdominal tenderness.   Musculoskeletal:         General: No tenderness. Normal range of motion.      Cervical back: Normal range of motion and neck supple.      Right lower leg: Edema (1+) present.      Left lower leg: Edema (1+) present.   Skin:     General: Skin is warm and dry.   Neurological:      General: No focal deficit present.      Mental Status: She is alert and oriented to person, place, and time. Mental status is at baseline.      Cranial Nerves: No cranial nerve deficit.   Psychiatric:         Behavior: Behavior normal.          CRANIAL NERVES     CN III, IV, VI   Pupils are equal, round, and reactive to light.     Significant Labs: All pertinent labs within the past 24 hours have been reviewed.    Significant Imaging: I have reviewed all pertinent imaging results/findings within the past 24 hours.    Assessment/Plan:     * Acute on chronic heart failure with preserved ejection fraction  Patient is identified as having Diastolic (HFpEF) heart failure that is Acute on chronic. CHF is currently uncontrolled due to Continued edema of extremities and JVD and Rales/crackles on pulmonary exam. Latest ECHO performed and demonstrates- Results for orders  placed during the hospital encounter of 09/30/22    Echo    Interpretation Summary  · The left ventricle is normal in size with concentric hypertrophy and normal systolic function.  · The estimated ejection fraction is 55%.  · Indeterminate left ventricular diastolic function.  · Normal right ventricular size with normal right ventricular systolic function.  · Mild left atrial enlargement.  · There is abnormal septal wall motion consistent with right ventricular pacemaker.  · Mild to moderate tricuspid regurgitation.  · There is a 26 mm Edward S3 transcutaneously-placed aortic bioprosthesis present. There is no aortic insufficiency present. Prosthetic aortic valve is normal.  · The aortic valve mean gradient is 7 mmHg with a dimensionless index of 0.51.  · Intermediate central venous pressure (8 mmHg).  · The estimated PA systolic pressure is 47 mmHg.  · There is pulmonary hypertension.  . Continue Beta Blocker, ACE/ARB and Furosemide and monitor clinical status closely. Monitor on telemetry. Patient is on CHF pathway.  Monitor strict Is&Os and daily weights.  Place on fluid restriction of 1.5 L. Cardiology has not been any consulted. Continue to stress to patient importance of self efficacy and  on diet for CHF. Last BNP reviewed- and noted below   Recent Labs   Lab 09/21/23  1250   *   .  Aortic stenosis s/p Porcine TAVR (on plavix)  - cont home meds coreg, diltiazem, imdur, ranolazine  - given 40mg IV lasix, lasix 40mg iv bid, strict I&Os, daily weights  - hypoxic in ED, placed on 2L NC with improvement of SOB/tachypnea  - , negative troponin  - ordered TTE    Acute cystitis with hematuria  Has complaint of dysuria, although UA appears noninfectious   Started on macrobid 9/18, will continue      Hyponatremia  Patient has hyponatremia which is uncontrolled,We will aim to correct the sodium by 4-6mEq in 24 hours. We will monitor sodium Daily. The hyponatremia is due to Heart Failure. We will  "obtain the following studies: Urine sodium, urine osmolality, serum osmolality. We will treat the hyponatremia with Fluid restriction of:  1.5 liter per day and lasix. The patient's sodium results have been reviewed and are listed below.  Recent Labs   Lab 09/21/23  1250   *       COPD (chronic obstructive pulmonary disease)  No signs of acute exacerbation  Cont home inhaler  Supp O2 PRN to keep O2 sat 88-92%  Duoneb PRN  May require 6MWT prior to discharge      Aortic stenosis  S/p TAVR      Persistent atrial fibrillation  Patient with Persistent (7 days or more) atrial fibrillation which is controlled currently with Beta Blocker. Patient is currently in atrial fibrillation.[id number] Score: 5. Anticoagulation indicated. Anticoagulation done with eliquis.  PPM in place    Essential hypertension  Cont coreg and losartan      Chronic mesenteric ischemia  No acute issues  Monitor      GERD (gastroesophageal reflux disease)  Cont PPI      HLD (hyperlipidemia)  Cont statin      Type 2 diabetes mellitus  Patient's FSGs are controlled on current medication regimen.  Last A1c reviewed-   Lab Results   Component Value Date    HGBA1C 5.3 09/21/2023     Most recent fingerstick glucose reviewed- No results for input(s): "POCTGLUCOSE" in the last 24 hours.  Current correctional scale  Low  Maintain anti-hyperglycemic dose as follows-   Antihyperglycemics (From admission, onward)    Start     Stop Route Frequency Ordered    09/21/23 1705  insulin aspart U-100 pen 0-5 Units         -- SubQ Before meals & nightly PRN 09/21/23 1606        Hold Oral hypoglycemics while patient is in the hospital.      VTE Risk Mitigation (From admission, onward)         Ordered     apixaban tablet 5 mg  2 times daily         09/21/23 1606     IP VTE HIGH RISK PATIENT  Once         09/21/23 1606     Place sequential compression device  Until discontinued         09/21/23 1606     Reason for No Pharmacological VTE Prophylaxis  Once      "   Question:  Reasons:  Answer:  Already adequately anticoagulated on oral Anticoagulants    09/21/23 3207                           Larry Agee MD  Department of Hospital Medicine  [facility] - Emergency Dept

## 2023-09-21 NOTE — SUBJECTIVE & OBJECTIVE
Past Medical History:   Diagnosis Date    Acute on chronic diastolic (congestive) heart failure 11/20/2019    Anticoagulant long-term use     on Plavix since May 2015    Anxiety     Arthritis     Asthma     Atrial fibrillation     Atrial fibrillation with RVR 10/19/2020    Cataracts, bilateral     Chest pain, atypical     Chronic atrial fibrillation with rapid ventricular response 6/23/2017    Colon polyp     Coronary artery disease     Diabetes mellitus     Dry eyes     Esophageal erosions     GERD (gastroesophageal reflux disease)     Heart murmur     Hemorrhoid     High cholesterol     Hypertension     Irritable bowel syndrome     Paroxysmal atrial fibrillation 10/30/2020    Persistent atrial fibrillation 2/10/2020    Shingles 2015    Stenosis of aortic and mitral valves     Stroke     TIA (transient ischemic attack)     Use of cane as ambulatory aid        Past Surgical History:   Procedure Laterality Date    ABDOMINAL SURGERY      stents to SMA    angiocele      2007, 2014    AORTIC VALVE REPLACEMENT N/A 11/19/2019    Procedure: Replacement-valve-aortic;  Surgeon: Jack Capone MD;  Location: [facility] CATH LAB;  Service: Cardiology;  Laterality: N/A;    BREAST SURGERY      left--- a lump--- no cancer    CARDIAC VALVE SURGERY      COLONOSCOPY  2014    COLONOSCOPY N/A 3/14/2018    Procedure: COLONOSCOPY;  Surgeon: Efren Kemp MD;  Location: [facility] ENDO ([address] [place] [address]);  Service: Endoscopy;  Laterality: N/A;  ok to hold Plavix 5 days prior to procedure per Dr RESHMA Hernandez     ok per Dr Kemp to hold Eliquis 2 days prior to procedure (see telephone encounter dated-2/7/18)    HERNIA REPAIR      HYSTERECTOMY  partial    1982 partial hysterectomy    LEFT HEART CATHETERIZATION Right 11/5/2019    Procedure: Left heart cath;  Surgeon: Jack Capone MD;  Location: [facility] CATH LAB;  Service: Cardiology;  Laterality: Right;    left nasal polyp      left neck lymph node      nose polyp      right hip fatty mass tissue      stent to small  intestine      SUPERIOR VENA CAVA ANGIOPLASTY / STENTING      TONSILLECTOMY      TOTAL ABDOMINAL HYSTERECTOMY      2014    TOTAL KNEE ARTHROPLASTY Bilateral     TREATMENT OF CARDIAC ARRHYTHMIA N/A 2/10/2020    Procedure: CARDIOVERSION;  Surgeon: Jayy Parrish MD;  Location: [facility] EP LAB;  Service: Cardiology;  Laterality: N/A;  AF, PEDRO, DCCV, MAC, DM, 3 Prep    TREATMENT OF CARDIAC ARRHYTHMIA N/A 5/15/2020    Procedure: CARDIOVERSION;  Surgeon: Hany Bee MD;  Location: [facility] EP LAB;  Service: Cardiology;  Laterality: N/A;  AF, PEDRO, DCCV, MAC, DM, 3 Prep    UPPER GASTROINTESTINAL ENDOSCOPY  2014       Review of patient's allergies indicates:   Allergen Reactions    Celebrex [celecoxib] Shortness Of Breath    Ciprofloxacin Swelling     lip    Dicyclomine Hives    Adhesive Dermatitis    Avelox [moxifloxacin] Swelling    Cilostazol Other (See Comments)     Elevates blood pressure    Eryc [erythromycin] Other (See Comments)     unknown    Iodine and iodide containing products Hives    Keflex [cephalexin] Hives    Meclomen      rashes    Meloxicam      Ear ringing    Metoclopramide Other (See Comments)     High blood pressure    Sulfa (sulfonamide antibiotics) Itching       Current Facility-Administered Medications on File Prior to Encounter   Medication    0.9%  NaCl infusion    sodium chloride 0.9% flush 5 mL     Current Outpatient Medications on File Prior to Encounter   Medication Sig    acetaminophen (TYLENOL) 650 MG TbSR Take 1,300 mg by mouth 2 (two) times daily as needed.    albuterol-ipratropium (DUO-NEB) 2.5 mg-0.5 mg/3 mL nebulizer solution Take 3 mLs by nebulization every 4 (four) hours as needed for Wheezing or Shortness of Breath.    apixaban (ELIQUIS) 5 mg Tab TAKE 1 TABLET BY MOUTH TWICE A DAY    ascorbic acid, vitamin C, (VITAMIN C) 500 MG tablet Take 1,000 mg by mouth once daily.     atorvastatin (LIPITOR) 10 MG tablet Take 1 tablet (10 mg total) by mouth once daily.    calcium carbonate/vitamin  D3 (CALTRATE 600 + D ORAL) Take 1 tablet by mouth once daily.    carvediloL (COREG) 12.5 MG tablet Take 1 tablet (12.5 mg total) by mouth 2 (two) times daily.    clopidogreL (PLAVIX) 75 mg tablet TAKE 1 TABLET BY MOUTH EVERY DAY    diltiaZEM (CARDIZEM CD) 240 MG 24 hr capsule TAKE 1 CAPSULE BY MOUTH ONCE A DAY    DULoxetine (CYMBALTA) 20 MG capsule Take 1 capsule (20 mg total) by mouth once daily.    fexofenadine (ALLEGRA) 60 MG tablet Take 60 mg by mouth once daily.    fish oil-omega-3 fatty acids 300-1,000 mg capsule Take 1 g by mouth once daily.     furosemide (LASIX) 40 MG tablet Take 1 tablet (40 mg total) by mouth 2 (two) times a day.    gabapentin (NEURONTIN) 600 MG tablet Take 600 mg by mouth 3 (three) times daily.    isosorbide mononitrate (IMDUR) 60 MG 24 hr tablet Take 1 tablet (60 mg total) by mouth once daily.    LORazepam (ATIVAN) 0.5 MG tablet Take 1 tablet (0.5 mg total) by mouth every morning.    losartan (COZAAR) 25 MG tablet Take 1 tablet (25 mg total) by mouth once daily.    metFORMIN (GLUCOPHAGE) 500 MG tablet Take 1 tablet (500 mg total) by mouth 2 (two) times daily.    montelukast (SINGULAIR) 10 mg tablet Take 10 mg by mouth once daily.    nitrofurantoin, macrocrystal-monohydrate, (MACROBID) 100 MG capsule Take 1 capsule by mouth twice daily for 7 days    pantoprazole (PROTONIX) 40 MG tablet Take 1 tablet (40 mg total) by mouth once daily.    phenazopyridine HCl (PYRIDIUM ORAL) Take 1 tablet by mouth 3 (three) times daily as needed.    POLYSORBATE 80/GLYCERIN (REFRESH DRY EYE THERAPY OPHT) Apply to eye 2 (two) times daily.    potassium gluconate 550 mg (90 mg) Tab Take 180 mg by mouth 2 (two) times a day.    ranolazine (RANEXA) 1,000 mg Tb12 Take 1 tablet (1,000 mg total) by mouth 2 (two) times daily.    SYMBICORT 160-4.5 mcg/actuation HFAA Inhale 1 puff into the lungs 2 (two) times daily.    vitamin D (VITAMIN D3) 1000 units Tab Take 2,000 Units by mouth once daily.    ZINC ORAL Take 1 tablet  by mouth once daily.    mv-mn/folic ac/calcium/vit K1 (WOMEN'S 50 PLUS MULTIVITAMIN ORAL) Take 1 tablet by mouth once daily.    nitroGLYCERIN (NITROSTAT) 0.4 MG SL tablet Place 1 tablet (0.4 mg total) under the tongue every 5 (five) minutes as needed for Chest pain.    oxybutynin (DITROPAN-XL) 5 MG TR24 Take 1 tablet (5 mg total) by mouth every other day.    [DISCONTINUED] fexofenadine (ALLEGRA) 60 MG tablet Take 3 tablets (180 mg total) by mouth every morning. (Patient taking differently: Take 60 mg by mouth once daily.)    [DISCONTINUED] gabapentin (NEURONTIN) 300 MG capsule Take 2 capsules (600 mg total) by mouth 3 (three) times daily. (Patient taking differently: Take 600 mg by mouth 3 (three) times daily. Patient takes 1 in the am, 1 in the afternoon, and 2 in the evening.)     Family History       Problem Relation (Age of Onset)    Heart attack Mother    Heart failure Brother    Stroke Father          Tobacco Use    Smoking status: Never    Smokeless tobacco: Never   Substance and Sexual Activity    Alcohol use: No    Drug use: No    Sexual activity: Not Currently     Review of Systems   Constitutional:  Negative for activity change, appetite change, chills, diaphoresis, fatigue and fever.   HENT:  Negative for congestion, postnasal drip, rhinorrhea, sinus pressure, sinus pain and sneezing.    Respiratory:  Positive for shortness of breath. Negative for cough, chest tightness, wheezing and stridor.    Cardiovascular:  Positive for chest pain. Negative for palpitations and leg swelling.   Gastrointestinal:  Negative for abdominal distention, abdominal pain, blood in stool, constipation, diarrhea and nausea.   Endocrine: Negative for cold intolerance and heat intolerance.   Genitourinary:  Negative for dysuria, flank pain and hematuria.   Musculoskeletal:  Negative for gait problem.   Neurological:  Negative for dizziness and weakness.   Psychiatric/Behavioral:  Negative for agitation and behavioral problems.     Objective:     Vital Signs (Most Recent):  Temp: 97.9 °F (36.6 °C) (09/21/23 1158)  Pulse: 69 (09/21/23 1406)  Resp: (!) 26 (09/21/23 1406)  BP: 134/63 (09/21/23 1158)  SpO2: 97 % (09/21/23 1406) Vital Signs (24h Range):  Temp:  [97.9 °F (36.6 °C)] 97.9 °F (36.6 °C)  Pulse:  [69] 69  Resp:  [24-26] 26  SpO2:  [90 %-97 %] 97 %  BP: (134)/(63) 134/63     Weight: 115.7 kg (255 lb)  Body mass index is 43.77 kg/m².     Physical Exam  Constitutional:       General: She is not in acute distress.     Appearance: She is well-developed. She is not ill-appearing or toxic-appearing.   HENT:      Head: Normocephalic and atraumatic.   Eyes:      Pupils: Pupils are equal, round, and reactive to light.   Neck:      Vascular: No JVD.      Comments: +JVD  Cardiovascular:      Rate and Rhythm: Normal rate and regular rhythm.      Heart sounds: Normal heart sounds. No murmur heard.     No friction rub. No gallop.   Pulmonary:      Effort: Pulmonary effort is normal. No respiratory distress.      Breath sounds: No stridor. Rales (bibasilar) present. No wheezing.   Abdominal:      General: Bowel sounds are normal. There is no distension.      Palpations: Abdomen is soft.      Tenderness: There is no abdominal tenderness.   Musculoskeletal:         General: No tenderness. Normal range of motion.      Cervical back: Normal range of motion and neck supple.      Right lower leg: Edema (1+) present.      Left lower leg: Edema (1+) present.   Skin:     General: Skin is warm and dry.   Neurological:      General: No focal deficit present.      Mental Status: She is alert and oriented to person, place, and time. Mental status is at baseline.      Cranial Nerves: No cranial nerve deficit.   Psychiatric:         Behavior: Behavior normal.          CRANIAL NERVES     CN III, IV, VI   Pupils are equal, round, and reactive to light.     Significant Labs: All pertinent labs within the past 24 hours have been reviewed.    Significant Imaging: I have  reviewed all pertinent imaging results/findings within the past 24 hours.

## 2023-09-21 NOTE — ASSESSMENT & PLAN NOTE
No signs of acute exacerbation  Cont home inhaler  Supp O2 PRN to keep O2 sat 88-92%  Duoneb PRN  May require 6MWT prior to discharge

## 2023-09-21 NOTE — ASSESSMENT & PLAN NOTE
Patient has hyponatremia which is uncontrolled,We will aim to correct the sodium by 4-6mEq in 24 hours. We will monitor sodium Daily. The hyponatremia is due to Heart Failure. We will obtain the following studies: Urine sodium, urine osmolality, serum osmolality. We will treat the hyponatremia with Fluid restriction of:  1.5 liter per day and lasix. The patient's sodium results have been reviewed and are listed below.  Recent Labs   Lab 09/21/23  1250   *

## 2023-09-21 NOTE — ED NOTES
Patient identifiers verified and correct for Adriana Strong  LOC: The patient is awake, alert and aware of environment with an appropriate affect, the patient is oriented x 3 and speaking appropriately.   APPEARANCE: Patient appears comfortable and in no acute distress, patient is clean and well groomed.  SKIN: The skin is warm and dry, color consistent with ethnicity, patient has normal skin turgor and moist mucus membranes, skin intact, no breakdown or bruising noted.   MUSCULOSKELETAL: Patient moving all extremities spontaneously, no swelling noted.  RESPIRATORY: Airway is open and patent, respirations are spontaneous, patient has a normal effort and rate, no accessory muscle use noted, pt placed on continuous pulse ox with O2 sats noted at 97% on 3L NC. Pt reports SOB, especially upon exertion.  CARDIAC: Pt placed on cardiac monitor. Patient has a normal rate and regular rhythm, no edema noted, capillary refill < 3 seconds.   GASTRO: Soft and non tender to palpation, no distention noted, normoactive bowel sounds present in all four quadrants. Pt states bowel movements have been regular.  : Pt denies any pain or frequency with urination.  NEURO: Pt opens eyes spontaneously, behavior appropriate to situation, follows commands, facial expression symmetrical, bilateral hand grasp equal and even, purposeful motor response noted, normal sensation in all extremities when touched with a finger.

## 2023-09-21 NOTE — ASSESSMENT & PLAN NOTE
Has complaint of dysuria, although UA appears noninfectious   Started on macrobid 9/18, will continue

## 2023-09-21 NOTE — ASSESSMENT & PLAN NOTE
Patient with Persistent (7 days or more) atrial fibrillation which is controlled currently with Beta Blocker. Patient is currently in atrial fibrillation.[id number] Score: 5. Anticoagulation indicated. Anticoagulation done with eliquis.  PPM in place

## 2023-09-22 DIAGNOSIS — R06.02 SOB (SHORTNESS OF BREATH): Primary | ICD-10-CM

## 2023-09-22 PROBLEM — R07.89 CHEST DISCOMFORT: Status: ACTIVE | Noted: 2023-09-22

## 2023-09-22 LAB
ANION GAP SERPL CALC-SCNC: 10 MMOL/L (ref 8–16)
ASCENDING AORTA: 2.92 CM
AV INDEX (PROSTH): 0.38
AV MEAN GRADIENT: 13 MMHG
AV PEAK GRADIENT: 24 MMHG
AV VALVE AREA BY VELOCITY RATIO: 1.22 CM²
AV VALVE AREA: 1.4 CM²
AV VELOCITY RATIO: 0.34
BSA FOR ECHO PROCEDURE: 2.29 M2
BUN SERPL-MCNC: 15 MG/DL (ref 8–23)
CALCIUM SERPL-MCNC: 9.3 MG/DL (ref 8.7–10.5)
CHLORIDE SERPL-SCNC: 91 MMOL/L (ref 95–110)
CO2 SERPL-SCNC: 34 MMOL/L (ref 23–29)
CREAT SERPL-MCNC: 0.8 MG/DL (ref 0.5–1.4)
CV ECHO LV RWT: 0.34 CM
DOP CALC AO PEAK VEL: 2.47 M/S
DOP CALC AO VTI: 50.33 CM
DOP CALC LVOT AREA: 3.6 CM2
DOP CALC LVOT DIAMETER: 2.15 CM
DOP CALC LVOT PEAK VEL: 0.83 M/S
DOP CALC LVOT STROKE VOLUME: 70.21 CM3
DOP CALCLVOT PEAK VEL VTI: 19.35 CM
E WAVE DECELERATION TIME: 151.79 MSEC
E/A RATIO: 3.15
E/E' RATIO: 19.38 M/S
ECHO LV POSTERIOR WALL: 0.88 CM (ref 0.6–1.1)
EST. GFR  (NO RACE VARIABLE): >60 ML/MIN/1.73 M^2
FRACTIONAL SHORTENING: 55 % (ref 28–44)
GLUCOSE SERPL-MCNC: 100 MG/DL (ref 70–110)
INTERVENTRICULAR SEPTUM: 1.11 CM (ref 0.6–1.1)
LA MAJOR: 6.19 CM
LA MINOR: 6.63 CM
LA WIDTH: 4.88 CM
LEFT ATRIUM SIZE: 4.02 CM
LEFT ATRIUM VOLUME INDEX MOD: 45.8 ML/M2
LEFT ATRIUM VOLUME INDEX: 49.2 ML/M2
LEFT ATRIUM VOLUME MOD: 99.38 CM3
LEFT ATRIUM VOLUME: 106.76 CM3
LEFT INTERNAL DIMENSION IN SYSTOLE: 2.34 CM (ref 2.1–4)
LEFT VENTRICLE DIASTOLIC VOLUME INDEX: 59.91 ML/M2
LEFT VENTRICLE DIASTOLIC VOLUME: 130 ML
LEFT VENTRICLE MASS INDEX: 89 G/M2
LEFT VENTRICLE SYSTOLIC VOLUME INDEX: 8.8 ML/M2
LEFT VENTRICLE SYSTOLIC VOLUME: 18.99 ML
LEFT VENTRICULAR INTERNAL DIMENSION IN DIASTOLE: 5.21 CM (ref 3.5–6)
LEFT VENTRICULAR MASS: 193.48 G
LV LATERAL E/E' RATIO: 15.75 M/S
LV SEPTAL E/E' RATIO: 25.2 M/S
MV A" WAVE DURATION": 3.43 MSEC
MV PEAK A VEL: 0.4 M/S
MV PEAK E VEL: 1.26 M/S
MV STENOSIS PRESSURE HALF TIME: 44.02 MS
MV VALVE AREA P 1/2 METHOD: 5 CM2
PISA TR MAX VEL: 3 M/S
POCT GLUCOSE: 119 MG/DL (ref 70–110)
POCT GLUCOSE: 174 MG/DL (ref 70–110)
POCT GLUCOSE: 97 MG/DL (ref 70–110)
POTASSIUM SERPL-SCNC: 4 MMOL/L (ref 3.5–5.1)
PULM VEIN S/D RATIO: 0.49
PV PEAK D VEL: 0.78 M/S
PV PEAK S VEL: 0.38 M/S
RA MAJOR: 5.26 CM
RA PRESSURE ESTIMATED: 8 MMHG
RA WIDTH: 3.61 CM
RIGHT VENTRICULAR END-DIASTOLIC DIMENSION: 2.6 CM
RV TB RVSP: 11 MMHG
SODIUM SERPL-SCNC: 135 MMOL/L (ref 136–145)
TDI LATERAL: 0.08 M/S
TDI SEPTAL: 0.05 M/S
TDI: 0.07 M/S
TR MAX PG: 36 MMHG
TRICUSPID ANNULAR PLANE SYSTOLIC EXCURSION: 1.41 CM
TROPONIN I SERPL DL<=0.01 NG/ML-MCNC: <0.006 NG/ML (ref 0–0.03)
TV REST PULMONARY ARTERY PRESSURE: 44 MMHG
Z-SCORE OF LEFT VENTRICULAR DIMENSION IN END DIASTOLE: -3.21
Z-SCORE OF LEFT VENTRICULAR DIMENSION IN END SYSTOLE: -4.94

## 2023-09-22 PROCEDURE — 94761 N-INVAS EAR/PLS OXIMETRY MLT: CPT

## 2023-09-22 PROCEDURE — 27000221 HC OXYGEN, UP TO 24 HOURS

## 2023-09-22 PROCEDURE — 99900035 HC TECH TIME PER 15 MIN (STAT)

## 2023-09-22 PROCEDURE — 80048 BASIC METABOLIC PNL TOTAL CA: CPT | Performed by: INTERNAL MEDICINE

## 2023-09-22 PROCEDURE — 25500020 PHARM REV CODE 255: Performed by: INTERNAL MEDICINE

## 2023-09-22 PROCEDURE — 36415 COLL VENOUS BLD VENIPUNCTURE: CPT | Performed by: INTERNAL MEDICINE

## 2023-09-22 PROCEDURE — 84484 ASSAY OF TROPONIN QUANT: CPT | Performed by: INTERNAL MEDICINE

## 2023-09-22 PROCEDURE — 25000003 PHARM REV CODE 250: Performed by: INTERNAL MEDICINE

## 2023-09-22 PROCEDURE — 94640 AIRWAY INHALATION TREATMENT: CPT

## 2023-09-22 PROCEDURE — 63600175 PHARM REV CODE 636 W HCPCS: Performed by: INTERNAL MEDICINE

## 2023-09-22 PROCEDURE — 21400001 HC TELEMETRY ROOM

## 2023-09-22 PROCEDURE — 25000242 PHARM REV CODE 250 ALT 637 W/ HCPCS: Performed by: INTERNAL MEDICINE

## 2023-09-22 RX ORDER — CLONIDINE HYDROCHLORIDE 0.1 MG/1
0.1 TABLET ORAL EVERY 6 HOURS PRN
Status: DISCONTINUED | OUTPATIENT
Start: 2023-09-22 | End: 2023-09-26 | Stop reason: HOSPADM

## 2023-09-22 RX ADMIN — SENNOSIDES AND DOCUSATE SODIUM 1 TABLET: 50; 8.6 TABLET ORAL at 08:09

## 2023-09-22 RX ADMIN — HUMAN ALBUMIN MICROSPHERES AND PERFLUTREN 0.66 MG: 10; .22 INJECTION, SOLUTION INTRAVENOUS at 09:09

## 2023-09-22 RX ADMIN — APIXABAN 5 MG: 5 TABLET, FILM COATED ORAL at 08:09

## 2023-09-22 RX ADMIN — CLOPIDOGREL BISULFATE 75 MG: 75 TABLET ORAL at 09:09

## 2023-09-22 RX ADMIN — CARVEDILOL 12.5 MG: 12.5 TABLET, FILM COATED ORAL at 09:09

## 2023-09-22 RX ADMIN — APIXABAN 5 MG: 5 TABLET, FILM COATED ORAL at 09:09

## 2023-09-22 RX ADMIN — ATORVASTATIN CALCIUM 10 MG: 10 TABLET, FILM COATED ORAL at 09:09

## 2023-09-22 RX ADMIN — DULOXETINE HYDROCHLORIDE 20 MG: 20 CAPSULE, DELAYED RELEASE ORAL at 09:09

## 2023-09-22 RX ADMIN — PANTOPRAZOLE SODIUM 40 MG: 40 TABLET, DELAYED RELEASE ORAL at 09:09

## 2023-09-22 RX ADMIN — NITROFURANTOIN MONOHYDRATE/MACROCRYSTALS 100 MG: 25; 75 CAPSULE ORAL at 09:09

## 2023-09-22 RX ADMIN — LOSARTAN POTASSIUM 25 MG: 25 TABLET, FILM COATED ORAL at 09:09

## 2023-09-22 RX ADMIN — GABAPENTIN 600 MG: 300 CAPSULE ORAL at 03:09

## 2023-09-22 RX ADMIN — CETIRIZINE HYDROCHLORIDE 10 MG: 10 TABLET, FILM COATED ORAL at 09:09

## 2023-09-22 RX ADMIN — MONTELUKAST 10 MG: 10 TABLET, FILM COATED ORAL at 09:09

## 2023-09-22 RX ADMIN — FLUTICASONE FUROATE AND VILANTEROL TRIFENATATE 1 PUFF: 100; 25 POWDER RESPIRATORY (INHALATION) at 07:09

## 2023-09-22 RX ADMIN — DILTIAZEM HYDROCHLORIDE 240 MG: 240 CAPSULE, COATED, EXTENDED RELEASE ORAL at 09:09

## 2023-09-22 RX ADMIN — SENNOSIDES AND DOCUSATE SODIUM 1 TABLET: 50; 8.6 TABLET ORAL at 09:09

## 2023-09-22 RX ADMIN — ISOSORBIDE MONONITRATE 60 MG: 60 TABLET, EXTENDED RELEASE ORAL at 09:09

## 2023-09-22 RX ADMIN — FUROSEMIDE 40 MG: 10 INJECTION, SOLUTION INTRAVENOUS at 05:09

## 2023-09-22 RX ADMIN — CARVEDILOL 12.5 MG: 12.5 TABLET, FILM COATED ORAL at 08:09

## 2023-09-22 RX ADMIN — RANOLAZINE 1000 MG: 500 TABLET, EXTENDED RELEASE ORAL at 08:09

## 2023-09-22 RX ADMIN — LORAZEPAM 0.5 MG: 0.5 TABLET ORAL at 09:09

## 2023-09-22 RX ADMIN — GABAPENTIN 600 MG: 300 CAPSULE ORAL at 08:09

## 2023-09-22 RX ADMIN — GABAPENTIN 600 MG: 300 CAPSULE ORAL at 09:09

## 2023-09-22 RX ADMIN — OXYBUTYNIN CHLORIDE 5 MG: 5 TABLET, EXTENDED RELEASE ORAL at 09:09

## 2023-09-22 RX ADMIN — RANOLAZINE 1000 MG: 500 TABLET, EXTENDED RELEASE ORAL at 09:09

## 2023-09-22 NOTE — ASSESSMENT & PLAN NOTE
Patient has hyponatremia which is uncontrolled,We will aim to correct the sodium by 4-6mEq in 24 hours. We will monitor sodium Daily. The hyponatremia is due to Heart Failure. We will obtain the following studies: Urine sodium, urine osmolality, serum osmolality. We will treat the hyponatremia with Fluid restriction of:  1.5 liter per day and lasix. The patient's sodium results have been reviewed and are listed below.  Recent Labs   Lab 09/22/23  0224   *     Improving with diuresis

## 2023-09-22 NOTE — SUBJECTIVE & OBJECTIVE
Interval History: Currently on 2.5L. Diuresing well, net negative 2.6L overnight. Pt states SOB is improving, but has noticed mild intermittent chest discomfort.     Review of Systems   Constitutional:  Negative for activity change, appetite change, chills, diaphoresis, fatigue and fever.   HENT:  Negative for congestion, postnasal drip, rhinorrhea, sinus pressure, sinus pain and sneezing.    Respiratory:  Positive for shortness of breath. Negative for cough, chest tightness, wheezing and stridor.    Cardiovascular:  Positive for chest pain. Negative for palpitations and leg swelling.   Gastrointestinal:  Negative for abdominal distention, abdominal pain, blood in stool, constipation, diarrhea and nausea.   Endocrine: Negative for cold intolerance and heat intolerance.   Genitourinary:  Negative for dysuria, flank pain and hematuria.   Musculoskeletal:  Negative for gait problem.   Neurological:  Negative for dizziness and weakness.   Psychiatric/Behavioral:  Negative for agitation and behavioral problems.      Objective:     Vital Signs (Most Recent):  Temp: 97.7 °F (36.5 °C) (09/22/23 1108)  Pulse: 69 (09/22/23 1124)  Resp: 17 (09/22/23 1108)  BP: (!) 148/66 (09/22/23 1108)  SpO2: 98 % (09/22/23 1108) Vital Signs (24h Range):  Temp:  [96.8 °F (36 °C)-98.2 °F (36.8 °C)] 97.7 °F (36.5 °C)  Pulse:  [69-78] 69  Resp:  [17-20] 17  SpO2:  [96 %-98 %] 98 %  BP: (148-184)/(66-77) 148/66     Weight: 115.7 kg (255 lb)  Body mass index is 43.77 kg/m².    Intake/Output Summary (Last 24 hours) at 9/22/2023 1412  Last data filed at 9/22/2023 1403  Gross per 24 hour   Intake 240 ml   Output 3350 ml   Net -3110 ml         Physical Exam  Constitutional:       General: She is not in acute distress.     Appearance: She is well-developed. She is not ill-appearing or toxic-appearing.   HENT:      Head: Normocephalic and atraumatic.   Eyes:      Pupils: Pupils are equal, round, and reactive to light.   Neck:      Vascular: No JVD.       Comments: +JVD  Cardiovascular:      Rate and Rhythm: Normal rate and regular rhythm.      Heart sounds: Normal heart sounds. No murmur heard.     No friction rub. No gallop.   Pulmonary:      Effort: Pulmonary effort is normal. No respiratory distress.      Breath sounds: No stridor. Rales (bibasilar) present. No wheezing.   Abdominal:      General: Bowel sounds are normal. There is no distension.      Palpations: Abdomen is soft.      Tenderness: There is no abdominal tenderness.   Musculoskeletal:         General: No tenderness. Normal range of motion.      Cervical back: Normal range of motion and neck supple.      Right lower leg: Edema (1+) present.      Left lower leg: Edema (1+) present.   Skin:     General: Skin is warm and dry.   Neurological:      General: No focal deficit present.      Mental Status: She is alert and oriented to person, place, and time. Mental status is at baseline.      Cranial Nerves: No cranial nerve deficit.   Psychiatric:         Behavior: Behavior normal.             Significant Labs: All pertinent labs within the past 24 hours have been reviewed.    Significant Imaging: I have reviewed all pertinent imaging results/findings within the past 24 hours.

## 2023-09-22 NOTE — PLAN OF CARE
Problem: Adult Inpatient Plan of Care  Goal: Plan of Care Review  Outcome: Ongoing, Not Progressing  Goal: Patient-Specific Goal (Individualized)  Outcome: Ongoing, Not Progressing  Goal: Absence of Hospital-Acquired Illness or Injury  Outcome: Ongoing, Not Progressing  Goal: Optimal Comfort and Wellbeing  Outcome: Ongoing, Not Progressing  Goal: Readiness for Transition of Care  Outcome: Ongoing, Not Progressing     Problem: Bariatric Environmental Safety  Goal: Safety Maintained with Care  Outcome: Ongoing, Not Progressing     Problem: Diabetes Comorbidity  Goal: Blood Glucose Level Within Targeted Range  Outcome: Ongoing, Not Progressing     Problem: Fluid and Electrolyte Imbalance (Acute Kidney Injury/Impairment)  Goal: Fluid and Electrolyte Balance  Outcome: Ongoing, Not Progressing     Problem: Oral Intake Inadequate (Acute Kidney Injury/Impairment)  Goal: Optimal Nutrition Intake  Outcome: Ongoing, Not Progressing     Problem: Renal Function Impairment (Acute Kidney Injury/Impairment)  Goal: Effective Renal Function  Outcome: Ongoing, Not Progressing     Problem: Fall Injury Risk  Goal: Absence of Fall and Fall-Related Injury  Outcome: Ongoing, Not Progressing     Problem: Skin Injury Risk Increased  Goal: Skin Health and Integrity  Outcome: Ongoing, Not Progressing

## 2023-09-22 NOTE — CONSULTS
Food & Nutrition  Education    Diet Education: Fluid and Salt Restriction  Time Spent: 15 minutes  Learners: Patient and Family Members      Nutrition Education provided with handouts: Heart Failure Nutrition Therapy and Fluid-Restricted Nutrition Therapy      Comments: RD spoke with patient regarding fluid and salt restriction. We discussed limiting sodium in her diet to control buildup of fluids around the heart, stomach, lungs, and legs. Limiting fluid in the diet also helps because too much fluid can cause SOB, poor appetite, and weight gain from swelling or edema, which makes the heart work harder. RD explained the importance of reading the Nutrition Facts label, which will help determine the sodium content in 1 serving of a food. Select foods with <140 mg or less per serving. Avoid processed foods. Eat more fresh foods. If/when dining out use precaution restaurant meals can be very high in sodium. Encouraging patient to uses more herb/spices to season foods without adding salt. RD encourages patient to monitor fluid intake as well discussing anything that is a liquid at room temperature must be accounted for in total fluid intake, if fluid restriction is applicable. Lastly, RD discussed weight monitoring to determine sudden weight changes, if any. Patient and SO agree to review the material on their own, but no questions/concerns currently. RD recommends outpatient clinic referral for further education. Recommendations provided below.      All questions and concerns answered. Dietitian's contact information provided.       Follow-Up: Yes    Please Re-consult as needed    Recommendations    1. Continue 2000 Calorie Diabetic/ Cardiac diet, fluid per MD.  2.  If PO intake <50% at meals, recommend Boost Glucose Control BID (flavor per patient).  3. Monitor I/O's, weight, and labs.  4. RD to monitor and follow-up.    Goals: Meet % EEN/EPN by follow-up date.  Nutrition Goal Status: new  Communication of RD  Recs: other (comment) (POC)    Thanks!    Jori Chisholm Registration Eligible, Provisional LDN

## 2023-09-22 NOTE — PROGRESS NOTES
"[facility]) nurse navigator notified of [facility] candidate in need of education and introduction to 4-6 week program.      PT aao x 3 while lying in bed, NVD with spouse, 2 sons and 3 grandchildren at bedside. Introduced self to pt as [facility] nurse navigator.     Patient given "[facility] Home Care Guide" which includes "Daily weight and symptom tracker".  Encouraged pt to review information.      Reviewed the following key points of [facility] program with pt and family:   1.) Take your medications as directed.    2.) Weight yourself daily   3.) Follow low salt and limited fluid diet.    4.) Stop smoking and start exercising   5.) Go to your appointments and call your team.      Pt reminded to follow Symptom tracker and to call at the onset of symptoms according to tracker.     Reviewed plan for follow up once discharged to include phone calls, in person and virtual visits to assist pt optimizing their heart failure medication regimen and encouraging healthy lifestyle modifications.  Reminded pt that program will assist them over the next 4-6 weeks and then patient will be transferred to long term care provider .  Reminded pt how to contact [facility] navigator via phone and or via [employer].     Pt instructed appointment will be printed on hospital discharge paperwork.     Pt also reminded RN will call 48-72 hours after discharge to check on them.     Pt verbalize read back of information given.  Encouraged pt and family to read over information often and contact team with any questions or concerns.    "

## 2023-09-22 NOTE — ASSESSMENT & PLAN NOTE
Intermittent substernal chest pain possibly 2/2 chronic angina  On imdur and ranolazine  No acute findings on tele, ekg, tte  Trend trops

## 2023-09-22 NOTE — ASSESSMENT & PLAN NOTE
Has complaint of dysuria, although UA appears noninfectious   Started on macrobid 9/18, discontinue

## 2023-09-22 NOTE — ASSESSMENT & PLAN NOTE
Patient is identified as having Diastolic (HFpEF) heart failure that is Acute on chronic. CHF is currently uncontrolled due to Continued edema of extremities and JVD and Rales/crackles on pulmonary exam. Latest ECHO performed and demonstrates- Results for orders placed during the hospital encounter of 09/30/22    Echo    Interpretation Summary  · The left ventricle is normal in size with concentric hypertrophy and normal systolic function.  · The estimated ejection fraction is 55%.  · Indeterminate left ventricular diastolic function.  · Normal right ventricular size with normal right ventricular systolic function.  · Mild left atrial enlargement.  · There is abnormal septal wall motion consistent with right ventricular pacemaker.  · Mild to moderate tricuspid regurgitation.  · There is a 26 mm Edward S3 transcutaneously-placed aortic bioprosthesis present. There is no aortic insufficiency present. Prosthetic aortic valve is normal.  · The aortic valve mean gradient is 7 mmHg with a dimensionless index of 0.51.  · Intermediate central venous pressure (8 mmHg).  · The estimated PA systolic pressure is 47 mmHg.  · There is pulmonary hypertension.  . Continue Beta Blocker, ACE/ARB and Furosemide and monitor clinical status closely. Monitor on telemetry. Patient is on CHF pathway.  Monitor strict Is&Os and daily weights.  Place on fluid restriction of 1.5 L. Cardiology has not been any consulted. Continue to stress to patient importance of self efficacy and  on diet for CHF. Last BNP reviewed- and noted below   Recent Labs   Lab 09/21/23  1250   *   .  Aortic stenosis s/p Porcine TAVR (on plavix)  - cont home meds coreg, diltiazem, imdur, ranolazine  - given 40mg IV lasix, lasix 40mg iv bid, strict I&Os, daily weights  - hypoxic in ED, placed on 2L NC with improvement of SOB/tachypnea  - , negative troponin  - TTE 9/21 showing EF of 55 - 60% with Grade III diastolic dysfunction.

## 2023-09-22 NOTE — ASSESSMENT & PLAN NOTE
Patient with Persistent (7 days or more) atrial fibrillation which is controlled currently with Beta Blocker. Patient is currently in atrial fibrillation.[id number] Score: 5. Anticoagulation indicated. Anticoagulation done with eliquis.  PPM in place

## 2023-09-22 NOTE — ASSESSMENT & PLAN NOTE
Patient's FSGs are controlled on current medication regimen.  Last A1c reviewed-   Lab Results   Component Value Date    HGBA1C 5.3 09/21/2023     Most recent fingerstick glucose reviewed-   Recent Labs   Lab 09/22/23  0722 09/22/23  1110   POCTGLUCOSE 119* 174*     Current correctional scale  Low  Maintain anti-hyperglycemic dose as follows-   Antihyperglycemics (From admission, onward)    Start     Stop Route Frequency Ordered    09/21/23 1705  insulin aspart U-100 pen 0-5 Units         -- SubQ Before meals & nightly PRN 09/21/23 1606        Hold Oral hypoglycemics while patient is in the hospital.

## 2023-09-22 NOTE — NURSING
"Nurses Note -- 4 Eyes      9/22/2023   1:20 AM      Skin assessed during: Admit      [x] No Altered Skin Integrity Present    [x]Prevention Measures Documented      [] Yes- Altered Skin Integrity Present or Discovered   [] LDA Added if Not in Epic (Describe Wound)   [] New Altered Skin Integrity was Present on Admit and Documented in LDA   [] Wound Image Taken    Wound Care Consulted? No    Attending Nurse:  Korin Vincent Lpn     Second RN/Staff Member:  Jose DUBOIS    Pt stated"I don't have any skin issues."           "

## 2023-09-22 NOTE — NURSING
Pt b/p 190/77 recheck 173/77 HR 70 Pt is  asymptomatic Notified [facility] team Md made aware via secure chat.

## 2023-09-22 NOTE — PROGRESS NOTES
[facility]  Progress Note    Patient Name: Adriana Strong  MRN: [id number]  Patient Class: IP- Inpatient   Admission Date: 9/21/2023  Length of Stay: 1 days  Attending Physician: Larry Agee MD  Primary Care Provider: Krzysztof Mcgraw MD        Subjective:     Principal Problem:Acute on chronic heart failure with preserved ejection fraction        HPI:  78F with PMH of COPD, HFpEF, Afib with PPM on eliquis, HTN, DM2, mesenteric ischemia who presents with c/o SOB since last night. SOB occurs while at rest and worse with exertion. She also noticed chest heaviness/tightness this AM. No known alleviating factors. Denies palpitations, diaphoresis, fever, chills, cough but has noticed mild wheezing. Not sure if there's been weight gain or swelling. No orthopnea, PND. No sick contacts. She is compliant with her meds, including lasix, but does admit to poor adherence to cardiac diet. She was started on macrobid on monday for UTI. Workup in ED remarkable for tachypnea/hypoxia requiring 2L NC, Hb 10.9, Na 128, Cl 89, , Trop neg, CXR with no acute findings. She received dose of iv lasix 40mg in ED. Will be admitted to hospital medicine service for further management.      Overview/Hospital Course:  No notes on file    Interval History: Currently on 2.5L. Diuresing well, net negative 2.6L overnight. Pt states SOB is improving, but has noticed mild intermittent chest discomfort.     Review of Systems   Constitutional:  Negative for activity change, appetite change, chills, diaphoresis, fatigue and fever.   HENT:  Negative for congestion, postnasal drip, rhinorrhea, sinus pressure, sinus pain and sneezing.    Respiratory:  Positive for shortness of breath. Negative for cough, chest tightness, wheezing and stridor.    Cardiovascular:  Positive for chest pain. Negative for palpitations and leg swelling.   Gastrointestinal:  Negative for abdominal distention, abdominal pain, blood in stool,  constipation, diarrhea and nausea.   Endocrine: Negative for cold intolerance and heat intolerance.   Genitourinary:  Negative for dysuria, flank pain and hematuria.   Musculoskeletal:  Negative for gait problem.   Neurological:  Negative for dizziness and weakness.   Psychiatric/Behavioral:  Negative for agitation and behavioral problems.      Objective:     Vital Signs (Most Recent):  Temp: 97.7 °F (36.5 °C) (09/22/23 1108)  Pulse: 69 (09/22/23 1124)  Resp: 17 (09/22/23 1108)  BP: (!) 148/66 (09/22/23 1108)  SpO2: 98 % (09/22/23 1108) Vital Signs (24h Range):  Temp:  [96.8 °F (36 °C)-98.2 °F (36.8 °C)] 97.7 °F (36.5 °C)  Pulse:  [69-78] 69  Resp:  [17-20] 17  SpO2:  [96 %-98 %] 98 %  BP: (148-184)/(66-77) 148/66     Weight: 115.7 kg (255 lb)  Body mass index is 43.77 kg/m².    Intake/Output Summary (Last 24 hours) at 9/22/2023 1412  Last data filed at 9/22/2023 1403  Gross per 24 hour   Intake 240 ml   Output 3350 ml   Net -3110 ml         Physical Exam  Constitutional:       General: She is not in acute distress.     Appearance: She is well-developed. She is not ill-appearing or toxic-appearing.   HENT:      Head: Normocephalic and atraumatic.   Eyes:      Pupils: Pupils are equal, round, and reactive to light.   Neck:      Vascular: No JVD.      Comments: +JVD  Cardiovascular:      Rate and Rhythm: Normal rate and regular rhythm.      Heart sounds: Normal heart sounds. No murmur heard.     No friction rub. No gallop.   Pulmonary:      Effort: Pulmonary effort is normal. No respiratory distress.      Breath sounds: No stridor. Rales (bibasilar) present. No wheezing.   Abdominal:      General: Bowel sounds are normal. There is no distension.      Palpations: Abdomen is soft.      Tenderness: There is no abdominal tenderness.   Musculoskeletal:         General: No tenderness. Normal range of motion.      Cervical back: Normal range of motion and neck supple.      Right lower leg: Edema (1+) present.      Left lower  leg: Edema (1+) present.   Skin:     General: Skin is warm and dry.   Neurological:      General: No focal deficit present.      Mental Status: She is alert and oriented to person, place, and time. Mental status is at baseline.      Cranial Nerves: No cranial nerve deficit.   Psychiatric:         Behavior: Behavior normal.             Significant Labs: All pertinent labs within the past 24 hours have been reviewed.    Significant Imaging: I have reviewed all pertinent imaging results/findings within the past 24 hours.      Assessment/Plan:      * Acute on chronic heart failure with preserved ejection fraction  Patient is identified as having Diastolic (HFpEF) heart failure that is Acute on chronic. CHF is currently uncontrolled due to Continued edema of extremities and JVD and Rales/crackles on pulmonary exam. Latest ECHO performed and demonstrates- Results for orders placed during the hospital encounter of 09/30/22    Echo    Interpretation Summary  · The left ventricle is normal in size with concentric hypertrophy and normal systolic function.  · The estimated ejection fraction is 55%.  · Indeterminate left ventricular diastolic function.  · Normal right ventricular size with normal right ventricular systolic function.  · Mild left atrial enlargement.  · There is abnormal septal wall motion consistent with right ventricular pacemaker.  · Mild to moderate tricuspid regurgitation.  · There is a 26 mm Edward S3 transcutaneously-placed aortic bioprosthesis present. There is no aortic insufficiency present. Prosthetic aortic valve is normal.  · The aortic valve mean gradient is 7 mmHg with a dimensionless index of 0.51.  · Intermediate central venous pressure (8 mmHg).  · The estimated PA systolic pressure is 47 mmHg.  · There is pulmonary hypertension.  . Continue Beta Blocker, ACE/ARB and Furosemide and monitor clinical status closely. Monitor on telemetry. Patient is on CHF pathway.  Monitor strict Is&Os and daily  weights.  Place on fluid restriction of 1.5 L. Cardiology has not been any consulted. Continue to stress to patient importance of self efficacy and  on diet for CHF. Last BNP reviewed- and noted below   Recent Labs   Lab 09/21/23  1250   *   .  Aortic stenosis s/p Porcine TAVR (on plavix)  - cont home meds coreg, diltiazem, imdur, ranolazine  - given 40mg IV lasix, lasix 40mg iv bid, strict I&Os, daily weights  - hypoxic in ED, placed on 2L NC with improvement of SOB/tachypnea  - , negative troponin  - TTE 9/21 showing EF of 55 - 60% with Grade III diastolic dysfunction.      Chest discomfort  Intermittent substernal chest pain possibly 2/2 chronic angina  On imdur and ranolazine  No acute findings on tele, ekg, tte  Trend trops        Acute cystitis with hematuria  Has complaint of dysuria, although UA appears noninfectious   Started on macrobid 9/18, discontinue      Hyponatremia  Patient has hyponatremia which is uncontrolled,We will aim to correct the sodium by 4-6mEq in 24 hours. We will monitor sodium Daily. The hyponatremia is due to Heart Failure. We will obtain the following studies: Urine sodium, urine osmolality, serum osmolality. We will treat the hyponatremia with Fluid restriction of:  1.5 liter per day and lasix. The patient's sodium results have been reviewed and are listed below.  Recent Labs   Lab 09/22/23  0224   *     Improving with diuresis    COPD (chronic obstructive pulmonary disease)  No signs of acute exacerbation  Cont home inhaler  Supp O2 PRN to keep O2 sat 88-92%  Duoneb PRN  May require 6MWT prior to discharge      Aortic stenosis  S/p TAVR      Persistent atrial fibrillation  Patient with Persistent (7 days or more) atrial fibrillation which is controlled currently with Beta Blocker. Patient is currently in atrial fibrillation.[id number] Score: 5. Anticoagulation indicated. Anticoagulation done with eliquis.  PPM in place    Essential hypertension  Cont  coreg and losartan      Chronic mesenteric ischemia  No acute issues  Monitor      GERD (gastroesophageal reflux disease)  Cont PPI      HLD (hyperlipidemia)  Cont statin      Type 2 diabetes mellitus  Patient's FSGs are controlled on current medication regimen.  Last A1c reviewed-   Lab Results   Component Value Date    HGBA1C 5.3 09/21/2023     Most recent fingerstick glucose reviewed-   Recent Labs   Lab 09/22/23  0722 09/22/23  1110   POCTGLUCOSE 119* 174*     Current correctional scale  Low  Maintain anti-hyperglycemic dose as follows-   Antihyperglycemics (From admission, onward)    Start     Stop Route Frequency Ordered    09/21/23 1705  insulin aspart U-100 pen 0-5 Units         -- SubQ Before meals & nightly PRN 09/21/23 1606        Hold Oral hypoglycemics while patient is in the hospital.      VTE Risk Mitigation (From admission, onward)         Ordered     apixaban tablet 5 mg  2 times daily         09/21/23 1606     IP VTE HIGH RISK PATIENT  Once         09/21/23 1606     Place sequential compression device  Until discontinued         09/21/23 1606     Reason for No Pharmacological VTE Prophylaxis  Once        Question:  Reasons:  Answer:  Already adequately anticoagulated on oral Anticoagulants    09/21/23 1606                Discharge Planning   MIMI:      Code Status: Full Code   Is the patient medically ready for discharge?:     Reason for patient still in hospital (select all that apply): Treatment                     Larry Agee MD  Department of Hospital Medicine   [facility]

## 2023-09-23 LAB
ANION GAP SERPL CALC-SCNC: 8 MMOL/L (ref 8–16)
BUN SERPL-MCNC: 13 MG/DL (ref 8–23)
CALCIUM SERPL-MCNC: 8.8 MG/DL (ref 8.7–10.5)
CHLORIDE SERPL-SCNC: 92 MMOL/L (ref 95–110)
CO2 SERPL-SCNC: 36 MMOL/L (ref 23–29)
CREAT SERPL-MCNC: 0.7 MG/DL (ref 0.5–1.4)
EST. GFR  (NO RACE VARIABLE): >60 ML/MIN/1.73 M^2
GLUCOSE SERPL-MCNC: 108 MG/DL (ref 70–110)
POCT GLUCOSE: 116 MG/DL (ref 70–110)
POCT GLUCOSE: 122 MG/DL (ref 70–110)
POCT GLUCOSE: 131 MG/DL (ref 70–110)
POCT GLUCOSE: 150 MG/DL (ref 70–110)
POTASSIUM SERPL-SCNC: 3.9 MMOL/L (ref 3.5–5.1)
SODIUM SERPL-SCNC: 136 MMOL/L (ref 136–145)

## 2023-09-23 PROCEDURE — 94761 N-INVAS EAR/PLS OXIMETRY MLT: CPT

## 2023-09-23 PROCEDURE — 36415 COLL VENOUS BLD VENIPUNCTURE: CPT | Performed by: INTERNAL MEDICINE

## 2023-09-23 PROCEDURE — 25000003 PHARM REV CODE 250: Performed by: INTERNAL MEDICINE

## 2023-09-23 PROCEDURE — 94799 UNLISTED PULMONARY SVC/PX: CPT

## 2023-09-23 PROCEDURE — 99233 PR SUBSEQUENT HOSPITAL CARE,LEVL III: ICD-10-PCS | Mod: ,,, | Performed by: INTERNAL MEDICINE

## 2023-09-23 PROCEDURE — 25000242 PHARM REV CODE 250 ALT 637 W/ HCPCS: Performed by: INTERNAL MEDICINE

## 2023-09-23 PROCEDURE — 21400001 HC TELEMETRY ROOM

## 2023-09-23 PROCEDURE — 80048 BASIC METABOLIC PNL TOTAL CA: CPT | Performed by: INTERNAL MEDICINE

## 2023-09-23 PROCEDURE — 99900035 HC TECH TIME PER 15 MIN (STAT)

## 2023-09-23 PROCEDURE — 63600175 PHARM REV CODE 636 W HCPCS: Performed by: INTERNAL MEDICINE

## 2023-09-23 PROCEDURE — 94640 AIRWAY INHALATION TREATMENT: CPT

## 2023-09-23 PROCEDURE — 27000221 HC OXYGEN, UP TO 24 HOURS

## 2023-09-23 PROCEDURE — 99233 SBSQ HOSP IP/OBS HIGH 50: CPT | Mod: ,,, | Performed by: INTERNAL MEDICINE

## 2023-09-23 RX ADMIN — RANOLAZINE 1000 MG: 500 TABLET, EXTENDED RELEASE ORAL at 08:09

## 2023-09-23 RX ADMIN — PANTOPRAZOLE SODIUM 40 MG: 40 TABLET, DELAYED RELEASE ORAL at 09:09

## 2023-09-23 RX ADMIN — CETIRIZINE HYDROCHLORIDE 10 MG: 10 TABLET, FILM COATED ORAL at 09:09

## 2023-09-23 RX ADMIN — GABAPENTIN 600 MG: 300 CAPSULE ORAL at 09:09

## 2023-09-23 RX ADMIN — APIXABAN 5 MG: 5 TABLET, FILM COATED ORAL at 09:09

## 2023-09-23 RX ADMIN — SENNOSIDES AND DOCUSATE SODIUM 1 TABLET: 50; 8.6 TABLET ORAL at 09:09

## 2023-09-23 RX ADMIN — GABAPENTIN 600 MG: 300 CAPSULE ORAL at 03:09

## 2023-09-23 RX ADMIN — CARVEDILOL 12.5 MG: 12.5 TABLET, FILM COATED ORAL at 08:09

## 2023-09-23 RX ADMIN — DILTIAZEM HYDROCHLORIDE 240 MG: 240 CAPSULE, COATED, EXTENDED RELEASE ORAL at 09:09

## 2023-09-23 RX ADMIN — APIXABAN 5 MG: 5 TABLET, FILM COATED ORAL at 08:09

## 2023-09-23 RX ADMIN — SENNOSIDES AND DOCUSATE SODIUM 1 TABLET: 50; 8.6 TABLET ORAL at 08:09

## 2023-09-23 RX ADMIN — CARVEDILOL 12.5 MG: 12.5 TABLET, FILM COATED ORAL at 09:09

## 2023-09-23 RX ADMIN — ATORVASTATIN CALCIUM 10 MG: 10 TABLET, FILM COATED ORAL at 09:09

## 2023-09-23 RX ADMIN — MONTELUKAST 10 MG: 10 TABLET, FILM COATED ORAL at 09:09

## 2023-09-23 RX ADMIN — FUROSEMIDE 40 MG: 10 INJECTION, SOLUTION INTRAVENOUS at 05:09

## 2023-09-23 RX ADMIN — DULOXETINE HYDROCHLORIDE 20 MG: 20 CAPSULE, DELAYED RELEASE ORAL at 09:09

## 2023-09-23 RX ADMIN — GABAPENTIN 600 MG: 300 CAPSULE ORAL at 08:09

## 2023-09-23 RX ADMIN — RANOLAZINE 1000 MG: 500 TABLET, EXTENDED RELEASE ORAL at 09:09

## 2023-09-23 RX ADMIN — ISOSORBIDE MONONITRATE 60 MG: 60 TABLET, EXTENDED RELEASE ORAL at 09:09

## 2023-09-23 RX ADMIN — LORAZEPAM 0.5 MG: 0.5 TABLET ORAL at 09:09

## 2023-09-23 RX ADMIN — FLUTICASONE FUROATE AND VILANTEROL TRIFENATATE 1 PUFF: 100; 25 POWDER RESPIRATORY (INHALATION) at 09:09

## 2023-09-23 RX ADMIN — CLOPIDOGREL BISULFATE 75 MG: 75 TABLET ORAL at 09:09

## 2023-09-23 RX ADMIN — LOSARTAN POTASSIUM 25 MG: 25 TABLET, FILM COATED ORAL at 09:09

## 2023-09-23 RX ADMIN — FUROSEMIDE 40 MG: 10 INJECTION, SOLUTION INTRAVENOUS at 06:09

## 2023-09-23 NOTE — ASSESSMENT & PLAN NOTE
Patient with Persistent (7 days or more) atrial fibrillation which is controlled currently with Beta Blocker. Patient is currently in atrial fibrillation.[id number] Score: 5. Anticoagulation indicated. Anticoagulation done with eliquis.  PPM in place

## 2023-09-23 NOTE — ASSESSMENT & PLAN NOTE
Patient's FSGs are controlled on current medication regimen.  Last A1c reviewed-   Lab Results   Component Value Date    HGBA1C 5.3 09/21/2023     Most recent fingerstick glucose reviewed-   Recent Labs   Lab 09/22/23  1548 09/22/23 2027 09/23/23  0812 09/23/23  1111   POCTGLUCOSE 97 131* 116* 150*     Current correctional scale  Low  Maintain anti-hyperglycemic dose as follows-   Antihyperglycemics (From admission, onward)    Start     Stop Route Frequency Ordered    09/21/23 1705  insulin aspart U-100 pen 0-5 Units         -- SubQ Before meals & nightly PRN 09/21/23 1606        Hold Oral hypoglycemics while patient is in the hospital.

## 2023-09-23 NOTE — PROGRESS NOTES
[facility]  Progress Note    Patient Name: Adriana Strong  MRN: [id number]  Patient Class: IP- Inpatient   Admission Date: 9/21/2023  Length of Stay: 2 days  Attending Physician: Roberta Garcia MD  Primary Care Provider: Krzysztof Mcgraw MD        Subjective:     Principal Problem:Acute on chronic heart failure with preserved ejection fraction        HPI:  78F with PMH of COPD, HFpEF, Afib with PPM on eliquis, HTN, DM2, mesenteric ischemia who presents with c/o SOB since last night. SOB occurs while at rest and worse with exertion. She also noticed chest heaviness/tightness this AM. No known alleviating factors. Denies palpitations, diaphoresis, fever, chills, cough but has noticed mild wheezing. Not sure if there's been weight gain or swelling. No orthopnea, PND. No sick contacts. She is compliant with her meds, including lasix, but does admit to poor adherence to cardiac diet. She was started on macrobid on monday for UTI. Workup in ED remarkable for tachypnea/hypoxia requiring 2L NC, Hb 10.9, Na 128, Cl 89, , Trop neg, CXR with no acute findings. She received dose of iv lasix 40mg in ED. Will be admitted to hospital medicine service for further management.      Overview/Hospital Course:  No notes on file    Interval History: Currently on 2L. Diuresing well, net negative 0.5L overnight. Pt states SOB is improving, she denies any chest discomfort.     Review of Systems   Constitutional:  Negative for activity change, appetite change, chills, diaphoresis, fatigue and fever.   HENT:  Negative for congestion, postnasal drip, rhinorrhea, sinus pressure, sinus pain and sneezing.    Respiratory:  Positive for shortness of breath. Negative for cough, chest tightness, wheezing and stridor.    Cardiovascular:  Negative for palpitations and leg swelling.   Gastrointestinal:  Negative for abdominal distention, abdominal pain, blood in stool, constipation, diarrhea and nausea.   Endocrine:  Negative for cold intolerance and heat intolerance.   Genitourinary:  Negative for dysuria, flank pain and hematuria.   Musculoskeletal:  Negative for gait problem.   Neurological:  Negative for dizziness and weakness.   Psychiatric/Behavioral:  Negative for agitation and behavioral problems.      Objective:     Vital Signs (Most Recent):  Temp: 97.9 °F (36.6 °C) (09/23/23 1114)  Pulse: 69 (09/23/23 1114)  Resp: 18 (09/23/23 1114)  BP: (!) 143/63 (09/23/23 1114)  SpO2: 97 % (09/23/23 1114) Vital Signs (24h Range):  Temp:  [97.2 °F (36.2 °C)-99.1 °F (37.3 °C)] 97.9 °F (36.6 °C)  Pulse:  [69-72] 69  Resp:  [17-20] 18  SpO2:  [95 %-98 %] 97 %  BP: (131-148)/(58-65) 143/63     Weight: 115 kg (253 lb 8.5 oz)  Body mass index is 43.52 kg/m².  No intake or output data in the 24 hours ending 09/23/23 1446        Physical Exam  Constitutional:       General: She is not in acute distress.     Appearance: She is well-developed. She is not ill-appearing or toxic-appearing.   HENT:      Head: Normocephalic and atraumatic.   Eyes:      Pupils: Pupils are equal, round, and reactive to light.   Neck:      Vascular: No JVD.      Comments: +JVD  Cardiovascular:      Rate and Rhythm: Normal rate and regular rhythm.      Heart sounds: Normal heart sounds. No murmur heard.     No friction rub. No gallop.   Pulmonary:      Effort: Pulmonary effort is normal. No respiratory distress.      Breath sounds: No stridor. Rales (bibasilar) present. No wheezing.   Abdominal:      General: Bowel sounds are normal. There is no distension.      Palpations: Abdomen is soft.      Tenderness: There is no abdominal tenderness.   Musculoskeletal:         General: No tenderness. Normal range of motion.      Cervical back: Normal range of motion and neck supple.      Right lower leg: Edema (1+) present.      Left lower leg: Edema (1+) present.   Skin:     General: Skin is warm and dry.   Neurological:      General: No focal deficit present.      Mental  Status: She is alert and oriented to person, place, and time. Mental status is at baseline.      Cranial Nerves: No cranial nerve deficit.   Psychiatric:         Behavior: Behavior normal.             Significant Labs: All pertinent labs within the past 24 hours have been reviewed.    Significant Imaging: I have reviewed all pertinent imaging results/findings within the past 24 hours.      Assessment/Plan:      * Acute on chronic heart failure with preserved ejection fraction  Patient is identified as having Diastolic (HFpEF) heart failure that is Acute on chronic. CHF is currently uncontrolled due to Continued edema of extremities and JVD and Rales/crackles on pulmonary exam. Latest ECHO performed and demonstrates- Results for orders placed during the hospital encounter of 09/30/22    Echo    Interpretation Summary  · The left ventricle is normal in size with concentric hypertrophy and normal systolic function.  · The estimated ejection fraction is 55%.  · Indeterminate left ventricular diastolic function.  · Normal right ventricular size with normal right ventricular systolic function.  · Mild left atrial enlargement.  · There is abnormal septal wall motion consistent with right ventricular pacemaker.  · Mild to moderate tricuspid regurgitation.  · There is a 26 mm Edward S3 transcutaneously-placed aortic bioprosthesis present. There is no aortic insufficiency present. Prosthetic aortic valve is normal.  · The aortic valve mean gradient is 7 mmHg with a dimensionless index of 0.51.  · Intermediate central venous pressure (8 mmHg).  · The estimated PA systolic pressure is 47 mmHg.  · There is pulmonary hypertension.  . Continue Beta Blocker, ACE/ARB and Furosemide and monitor clinical status closely. Monitor on telemetry. Patient is on CHF pathway.  Monitor strict Is&Os and daily weights.  Place on fluid restriction of 1.5 L. Cardiology has not been any consulted. Continue to stress to patient importance of self  efficacy and  on diet for CHF. Last BNP reviewed- and noted below   Recent Labs   Lab 09/21/23  1250   *   .  Aortic stenosis s/p Porcine TAVR (on plavix)  - cont home meds coreg, diltiazem, imdur, ranolazine  - given 40mg IV lasix, lasix 40mg iv bid, strict I&Os, daily weights  - hypoxic in ED, placed on 2L NC with improvement of SOB/tachypnea  - , negative troponin  - TTE 9/21 showing EF of 55 - 60% with Grade III diastolic dysfunction.      Chest discomfort  Intermittent substernal chest pain possibly 2/2 chronic angina  On imdur and ranolazine  No acute findings on tele, ekg, tte  Trend trops        Acute cystitis with hematuria  Has complaint of dysuria, although UA appears noninfectious   Started on macrobid 9/18, discontinued      Hyponatremia  Patient has hyponatremia which is uncontrolled,We will aim to correct the sodium by 4-6mEq in 24 hours. We will monitor sodium Daily. The hyponatremia is due to Heart Failure. We will obtain the following studies: Urine sodium, urine osmolality, serum osmolality. We will treat the hyponatremia with Fluid restriction of:  1.5 liter per day and lasix. The patient's sodium results have been reviewed and are listed below.  Recent Labs   Lab 09/23/23  0247        Improving with diuresis    COPD (chronic obstructive pulmonary disease)  No signs of acute exacerbation  Cont home inhaler  Supp O2 PRN to keep O2 sat 88-92%  Duoneb PRN  May require 6MWT prior to discharge      Aortic stenosis  S/p TAVR      Persistent atrial fibrillation  Patient with Persistent (7 days or more) atrial fibrillation which is controlled currently with Beta Blocker. Patient is currently in atrial fibrillation.[id number] Score: 5. Anticoagulation indicated. Anticoagulation done with eliquis.  PPM in place    Essential hypertension  Cont coreg and losartan      Chronic mesenteric ischemia  No acute issues  Monitor      GERD (gastroesophageal reflux disease)  Cont  PPI      HLD (hyperlipidemia)  Cont statin      Type 2 diabetes mellitus  Patient's FSGs are controlled on current medication regimen.  Last A1c reviewed-   Lab Results   Component Value Date    HGBA1C 5.3 09/21/2023     Most recent fingerstick glucose reviewed-   Recent Labs   Lab 09/22/23  1548 09/22/23  2027 09/23/23  0812 09/23/23  1111   POCTGLUCOSE 97 131* 116* 150*     Current correctional scale  Low  Maintain anti-hyperglycemic dose as follows-   Antihyperglycemics (From admission, onward)    Start     Stop Route Frequency Ordered    09/21/23 1705  insulin aspart U-100 pen 0-5 Units         -- SubQ Before meals & nightly PRN 09/21/23 1606        Hold Oral hypoglycemics while patient is in the hospital.      VTE Risk Mitigation (From admission, onward)         Ordered     apixaban tablet 5 mg  2 times daily         09/21/23 1606     IP VTE HIGH RISK PATIENT  Once         09/21/23 1606     Place sequential compression device  Until discontinued         09/21/23 1606     Reason for No Pharmacological VTE Prophylaxis  Once        Question:  Reasons:  Answer:  Already adequately anticoagulated on oral Anticoagulants    09/21/23 1606                Discharge Planning   MIMI:      Code Status: Full Code   Is the patient medically ready for discharge?: No    Reason for patient still in hospital (select all that apply): Treatment                     Roberta Garcia MD  Department of Hospital Medicine   [facility]

## 2023-09-23 NOTE — ASSESSMENT & PLAN NOTE
Patient has hyponatremia which is uncontrolled,We will aim to correct the sodium by 4-6mEq in 24 hours. We will monitor sodium Daily. The hyponatremia is due to Heart Failure. We will obtain the following studies: Urine sodium, urine osmolality, serum osmolality. We will treat the hyponatremia with Fluid restriction of:  1.5 liter per day and lasix. The patient's sodium results have been reviewed and are listed below.  Recent Labs   Lab 09/23/23  0247        Improving with diuresis

## 2023-09-23 NOTE — SUBJECTIVE & OBJECTIVE
Interval History: Currently on 2L. Diuresing well, net negative 0.5L overnight. Pt states SOB is improving, she denies any chest discomfort.     Review of Systems   Constitutional:  Negative for activity change, appetite change, chills, diaphoresis, fatigue and fever.   HENT:  Negative for congestion, postnasal drip, rhinorrhea, sinus pressure, sinus pain and sneezing.    Respiratory:  Positive for shortness of breath. Negative for cough, chest tightness, wheezing and stridor.    Cardiovascular:  Negative for palpitations and leg swelling.   Gastrointestinal:  Negative for abdominal distention, abdominal pain, blood in stool, constipation, diarrhea and nausea.   Endocrine: Negative for cold intolerance and heat intolerance.   Genitourinary:  Negative for dysuria, flank pain and hematuria.   Musculoskeletal:  Negative for gait problem.   Neurological:  Negative for dizziness and weakness.   Psychiatric/Behavioral:  Negative for agitation and behavioral problems.      Objective:     Vital Signs (Most Recent):  Temp: 97.9 °F (36.6 °C) (09/23/23 1114)  Pulse: 69 (09/23/23 1114)  Resp: 18 (09/23/23 1114)  BP: (!) 143/63 (09/23/23 1114)  SpO2: 97 % (09/23/23 1114) Vital Signs (24h Range):  Temp:  [97.2 °F (36.2 °C)-99.1 °F (37.3 °C)] 97.9 °F (36.6 °C)  Pulse:  [69-72] 69  Resp:  [17-20] 18  SpO2:  [95 %-98 %] 97 %  BP: (131-148)/(58-65) 143/63     Weight: 115 kg (253 lb 8.5 oz)  Body mass index is 43.52 kg/m².  No intake or output data in the 24 hours ending 09/23/23 1446        Physical Exam  Constitutional:       General: She is not in acute distress.     Appearance: She is well-developed. She is not ill-appearing or toxic-appearing.   HENT:      Head: Normocephalic and atraumatic.   Eyes:      Pupils: Pupils are equal, round, and reactive to light.   Neck:      Vascular: No JVD.      Comments: +JVD  Cardiovascular:      Rate and Rhythm: Normal rate and regular rhythm.      Heart sounds: Normal heart sounds. No murmur  heard.     No friction rub. No gallop.   Pulmonary:      Effort: Pulmonary effort is normal. No respiratory distress.      Breath sounds: No stridor. Rales (bibasilar) present. No wheezing.   Abdominal:      General: Bowel sounds are normal. There is no distension.      Palpations: Abdomen is soft.      Tenderness: There is no abdominal tenderness.   Musculoskeletal:         General: No tenderness. Normal range of motion.      Cervical back: Normal range of motion and neck supple.      Right lower leg: Edema (1+) present.      Left lower leg: Edema (1+) present.   Skin:     General: Skin is warm and dry.   Neurological:      General: No focal deficit present.      Mental Status: She is alert and oriented to person, place, and time. Mental status is at baseline.      Cranial Nerves: No cranial nerve deficit.   Psychiatric:         Behavior: Behavior normal.             Significant Labs: All pertinent labs within the past 24 hours have been reviewed.    Significant Imaging: I have reviewed all pertinent imaging results/findings within the past 24 hours.

## 2023-09-23 NOTE — PLAN OF CARE
Problem: Adult Inpatient Plan of Care  Goal: Plan of Care Review  Outcome: Ongoing, Progressing  Goal: Patient-Specific Goal (Individualized)  Outcome: Ongoing, Progressing  Goal: Absence of Hospital-Acquired Illness or Injury  Outcome: Ongoing, Progressing  Goal: Optimal Comfort and Wellbeing  Outcome: Ongoing, Progressing  Goal: Readiness for Transition of Care  Outcome: Ongoing, Progressing     Problem: Adult Inpatient Plan of Care  Goal: Readiness for Transition of Care  Outcome: Ongoing, Progressing

## 2023-09-23 NOTE — ASSESSMENT & PLAN NOTE
Has complaint of dysuria, although UA appears noninfectious   Started on macrobid 9/18, discontinued

## 2023-09-23 NOTE — PLAN OF CARE
Problem: Adult Inpatient Plan of Care  Goal: Plan of Care Review  Outcome: Ongoing, Progressing  Goal: Patient-Specific Goal (Individualized)  Outcome: Ongoing, Progressing  Goal: Absence of Hospital-Acquired Illness or Injury  Outcome: Ongoing, Progressing  Goal: Optimal Comfort and Wellbeing  Outcome: Ongoing, Progressing  Goal: Readiness for Transition of Care  Outcome: Ongoing, Progressing     Problem: Bariatric Environmental Safety  Goal: Safety Maintained with Care  Outcome: Ongoing, Progressing     Problem: Diabetes Comorbidity  Goal: Blood Glucose Level Within Targeted Range  Outcome: Ongoing, Progressing     Problem: Fluid and Electrolyte Imbalance (Acute Kidney Injury/Impairment)  Goal: Fluid and Electrolyte Balance  Outcome: Ongoing, Progressing     Problem: Oral Intake Inadequate (Acute Kidney Injury/Impairment)  Goal: Optimal Nutrition Intake  Outcome: Ongoing, Progressing     Problem: Renal Function Impairment (Acute Kidney Injury/Impairment)  Goal: Effective Renal Function  Outcome: Ongoing, Progressing     Problem: Fall Injury Risk  Goal: Absence of Fall and Fall-Related Injury  Outcome: Ongoing, Progressing     Problem: Skin Injury Risk Increased  Goal: Skin Health and Integrity  Outcome: Ongoing, Progressing

## 2023-09-24 LAB
ANION GAP SERPL CALC-SCNC: 8 MMOL/L (ref 8–16)
BUN SERPL-MCNC: 19 MG/DL (ref 8–23)
CALCIUM SERPL-MCNC: 8.8 MG/DL (ref 8.7–10.5)
CHLORIDE SERPL-SCNC: 91 MMOL/L (ref 95–110)
CO2 SERPL-SCNC: 36 MMOL/L (ref 23–29)
CREAT SERPL-MCNC: 0.7 MG/DL (ref 0.5–1.4)
EST. GFR  (NO RACE VARIABLE): >60 ML/MIN/1.73 M^2
GLUCOSE SERPL-MCNC: 115 MG/DL (ref 70–110)
POCT GLUCOSE: 113 MG/DL (ref 70–110)
POCT GLUCOSE: 124 MG/DL (ref 70–110)
POCT GLUCOSE: 124 MG/DL (ref 70–110)
POCT GLUCOSE: 142 MG/DL (ref 70–110)
POCT GLUCOSE: 145 MG/DL (ref 70–110)
POTASSIUM SERPL-SCNC: 3.6 MMOL/L (ref 3.5–5.1)
SODIUM SERPL-SCNC: 135 MMOL/L (ref 136–145)

## 2023-09-24 PROCEDURE — 97116 GAIT TRAINING THERAPY: CPT

## 2023-09-24 PROCEDURE — 21400001 HC TELEMETRY ROOM

## 2023-09-24 PROCEDURE — 94761 N-INVAS EAR/PLS OXIMETRY MLT: CPT

## 2023-09-24 PROCEDURE — 25000003 PHARM REV CODE 250: Performed by: INTERNAL MEDICINE

## 2023-09-24 PROCEDURE — 99233 SBSQ HOSP IP/OBS HIGH 50: CPT | Mod: ,,, | Performed by: INTERNAL MEDICINE

## 2023-09-24 PROCEDURE — 97530 THERAPEUTIC ACTIVITIES: CPT

## 2023-09-24 PROCEDURE — 80048 BASIC METABOLIC PNL TOTAL CA: CPT | Performed by: INTERNAL MEDICINE

## 2023-09-24 PROCEDURE — 99233 PR SUBSEQUENT HOSPITAL CARE,LEVL III: ICD-10-PCS | Mod: ,,, | Performed by: INTERNAL MEDICINE

## 2023-09-24 PROCEDURE — 97161 PT EVAL LOW COMPLEX 20 MIN: CPT

## 2023-09-24 PROCEDURE — 27000221 HC OXYGEN, UP TO 24 HOURS

## 2023-09-24 PROCEDURE — 36415 COLL VENOUS BLD VENIPUNCTURE: CPT | Performed by: INTERNAL MEDICINE

## 2023-09-24 PROCEDURE — 99900035 HC TECH TIME PER 15 MIN (STAT)

## 2023-09-24 PROCEDURE — 63600175 PHARM REV CODE 636 W HCPCS: Performed by: INTERNAL MEDICINE

## 2023-09-24 RX ORDER — POLYETHYLENE GLYCOL 3350 17 G/17G
17 POWDER, FOR SOLUTION ORAL DAILY
Status: DISCONTINUED | OUTPATIENT
Start: 2023-09-25 | End: 2023-09-24

## 2023-09-24 RX ORDER — POLYETHYLENE GLYCOL 3350 17 G/17G
17 POWDER, FOR SOLUTION ORAL DAILY
Status: DISCONTINUED | OUTPATIENT
Start: 2023-09-24 | End: 2023-09-26 | Stop reason: HOSPADM

## 2023-09-24 RX ADMIN — APIXABAN 5 MG: 5 TABLET, FILM COATED ORAL at 08:09

## 2023-09-24 RX ADMIN — CARVEDILOL 12.5 MG: 12.5 TABLET, FILM COATED ORAL at 08:09

## 2023-09-24 RX ADMIN — SENNOSIDES AND DOCUSATE SODIUM 1 TABLET: 50; 8.6 TABLET ORAL at 08:09

## 2023-09-24 RX ADMIN — CLOPIDOGREL BISULFATE 75 MG: 75 TABLET ORAL at 08:09

## 2023-09-24 RX ADMIN — DILTIAZEM HYDROCHLORIDE 240 MG: 240 CAPSULE, COATED, EXTENDED RELEASE ORAL at 08:09

## 2023-09-24 RX ADMIN — RANOLAZINE 1000 MG: 500 TABLET, EXTENDED RELEASE ORAL at 08:09

## 2023-09-24 RX ADMIN — CETIRIZINE HYDROCHLORIDE 10 MG: 10 TABLET, FILM COATED ORAL at 08:09

## 2023-09-24 RX ADMIN — POLYETHYLENE GLYCOL 3350 17 G: 17 POWDER, FOR SOLUTION ORAL at 04:09

## 2023-09-24 RX ADMIN — GABAPENTIN 600 MG: 300 CAPSULE ORAL at 08:09

## 2023-09-24 RX ADMIN — ATORVASTATIN CALCIUM 10 MG: 10 TABLET, FILM COATED ORAL at 08:09

## 2023-09-24 RX ADMIN — FUROSEMIDE 40 MG: 10 INJECTION, SOLUTION INTRAVENOUS at 05:09

## 2023-09-24 RX ADMIN — PANTOPRAZOLE SODIUM 40 MG: 40 TABLET, DELAYED RELEASE ORAL at 08:09

## 2023-09-24 RX ADMIN — MONTELUKAST 10 MG: 10 TABLET, FILM COATED ORAL at 08:09

## 2023-09-24 RX ADMIN — DULOXETINE HYDROCHLORIDE 20 MG: 20 CAPSULE, DELAYED RELEASE ORAL at 08:09

## 2023-09-24 RX ADMIN — LOSARTAN POTASSIUM 25 MG: 25 TABLET, FILM COATED ORAL at 08:09

## 2023-09-24 RX ADMIN — LORAZEPAM 0.5 MG: 0.5 TABLET ORAL at 06:09

## 2023-09-24 RX ADMIN — GABAPENTIN 600 MG: 300 CAPSULE ORAL at 03:09

## 2023-09-24 RX ADMIN — FUROSEMIDE 40 MG: 10 INJECTION, SOLUTION INTRAVENOUS at 04:09

## 2023-09-24 RX ADMIN — ISOSORBIDE MONONITRATE 60 MG: 60 TABLET, EXTENDED RELEASE ORAL at 08:09

## 2023-09-24 RX ADMIN — OXYBUTYNIN CHLORIDE 5 MG: 5 TABLET, EXTENDED RELEASE ORAL at 08:09

## 2023-09-24 NOTE — ASSESSMENT & PLAN NOTE
Patient's FSGs are controlled on current medication regimen.  Last A1c reviewed-   Lab Results   Component Value Date    HGBA1C 5.3 09/21/2023     Most recent fingerstick glucose reviewed-   Recent Labs   Lab 09/23/23  1558 09/23/23 2016 09/24/23  0808 09/24/23  1055   POCTGLUCOSE 122* 124* 124* 145*     Current correctional scale  Low  Maintain anti-hyperglycemic dose as follows-   Antihyperglycemics (From admission, onward)    Start     Stop Route Frequency Ordered    09/21/23 1705  insulin aspart U-100 pen 0-5 Units         -- SubQ Before meals & nightly PRN 09/21/23 1606        Hold Oral hypoglycemics while patient is in the hospital.

## 2023-09-24 NOTE — PT/OT/SLP EVAL
Physical Therapy Evaluation    Patient Name:  Adriana Strong   MRN:  [id number]  Admit Date: 9/21/2023  Admitting Diagnosis:  Acute on chronic heart failure with preserved ejection fraction  Length of Stay: 3 days  Recent Surgery: * No surgery found *      Recommendations:     Discharge Recommendations:  other (see comments) (defer to CM/SW)   Discharge Equipment Recommendations: none       Assessment:     Adriana Strong is a 78 y.o. female admitted with a medical diagnosis of Acute on chronic heart failure with preserved ejection fraction. Pt found alert and cooperative. Pt demo'd increased anxiety with mobility. Pt demo'd good ROM and strength in B LE, but pt was quick to fatigue and had impaired endurance. SpO2 remained >90% on 2 L. Pt had moderate SOB with activity. Pt was able to perform bed mobility, stand, and ambulate with one person assistance and a RW.     Problem List: weakness, impaired endurance, gait instability, impaired balance, impaired cardiopulmonary response to activity  Rehab Prognosis: Good; patient would benefit from acute skilled PT services to address these deficits and reach maximum level of function.      Plan:     During this hospitalization, patient to be seen 4 x/week to address the identified rehab impairments via gait training, therapeutic activities, therapeutic exercises, neuromuscular re-education and progress towards the established goals.    Plan of Care Expires:  10/23/23    Subjective   Communicated with RN prior to session.  Patient found HOB elevated upon PT entry to room, agreeable to evaluation. Adriana Strong's  and daughter present during session.    Chief Complaint: Shortness of Breath (Pt c/o SOB, worsens with exertion.  Hx CHF)    Patient/Family Comments/goals: to get better  Pain/Comfort:  Pain Rating 1: 0/10  Pain Rating Post-Intervention 1: 0/10    Living Environment:  Pt lives with  and daughter in a 2SH with a TH entrance. Pt lives on the first  "floor. Pt was mod idnd with ambulation (rollator) and required 50% assistance with ADLs. Patient uses DME as follows: rollator, grab bar (built in shower chair (not large enough to comfortable sit for prolonged time); pt owns a BSC but does not use).    Patient reports they will have assistance from  and daughter upon discharge.    Objective:   Patient found with: telemetry, peripheral IV, oxygen     General Precautions: Standard, Cardiac fall   Orthopedic Precautions:N/A   Braces: N/A   Oxygen Device: Nasal Cannula  Vitals: BP (!) 130/59 (BP Location: Left arm, Patient Position: Lying)   Pulse 69   Temp 98 °F (36.7 °C) (Oral)   Resp 19   Ht 5' 4" (1.626 m)   Wt 115 kg (253 lb 8.5 oz)   LMP 01/21/1973 (LMP Unknown)   SpO2 96%   BMI 43.52 kg/m²     Exams:  Cognition:   Alert and Cooperative  Ox4  Command following: Follows two step commands  Fluency: clear/fluent    RLE ROM: WFL  RLE Strength: grossly 4/5  LLE ROM: WFL  LLE Strength: grossly 4/5    B LE edema noted      Outcome Measures:  AM-PAC 6 CLICK MOBILITY  Turning over in bed (including adjusting bedclothes, sheets and blankets)?: 3  Sitting down on and standing up from a chair with arms (e.g., wheelchair, bedside commode, etc.): 3  Moving from lying on back to sitting on the side of the bed?: 3  Moving to and from a bed to a chair (including a wheelchair)?: 3  Need to walk in hospital room?: 3  Climbing 3-5 steps with a railing?: 2  Basic Mobility Total Score: 17     Functional Mobility:  Additional staff present: NA  Bed Mobility:  Supine to Sit: minimum assistance; HOB elevated  Scooting anteriorly to EOB to have both feet planted on floor: contact guard assistance    Sitting Balance at Edge of Bed:  Assistance Level Required: Stand-by Assistance  Time: 8 minutes  Comments:   Worked on activity tolerance sitting EOB and worked on tolerance to positional change     Transfers:   Sit <> Stand Transfer: minimum assistance with rolling walker from " EOB x 3 trials  Verbal cuing for hand placement  Facilitation of hip and thoracic extension       Gait:   Patient ambulated: 4ft + 6ft + 20ft (seated rest breaks between trials)   Patient required: minimal assist  Patient used: rolling walker  Gait Pattern observed: reciprocal gait  Gait Deviation(s): occasional unsteady gait, decreased step length, wide base of support, and decreased dany  Impairments due to: impaired balance, decreased strength, decreased endurance, increased anxiety with mobility   Comments:  Pt with occasional posterior LOBs requiring min A to correct  Moderate SOB present  Facilitation of B weight shifting and RW management  Verbal cuing for advancement of B LE and upright posture       Therapeutic Activities, Exercises, & Education:   Educated pt on PT role/POC  Educated pt on importance of OOB activity and daily ambulation   Pt verbalized understanding     T/f to chair to increase tolerance to OOB activity and to create optimal positioning for lung expansion     Patient left up in chair with all lines intact, call button in reach, and RN notified and  and daughter present    GOALS:   Multidisciplinary Problems       Physical Therapy Goals          Problem: Physical Therapy    Goal Priority Disciplines Outcome Goal Variances Interventions   Physical Therapy Goal     PT, PT/OT Ongoing, Progressing     Description: Goals to be met by: 10/10/2023    Patient will increase functional independence with mobility by performin. Supine to sit with Supervision  2. Sit to stand transfer with Supervision using RW  3. Gait  x 50 feet with Supervision using RW.                          History:     Past Medical History:   Diagnosis Date    Acute on chronic diastolic (congestive) heart failure 2019    Anticoagulant long-term use     on Plavix since May 2015    Anxiety     Arthritis     Asthma     Atrial fibrillation     Atrial fibrillation with RVR 10/19/2020    Cataracts, bilateral      Chest pain, atypical     Chronic atrial fibrillation with rapid ventricular response 6/23/2017    Colon polyp     Coronary artery disease     Diabetes mellitus     Dry eyes     Esophageal erosions     GERD (gastroesophageal reflux disease)     Heart murmur     Hemorrhoid     High cholesterol     Hypertension     Irritable bowel syndrome     Paroxysmal atrial fibrillation 10/30/2020    Persistent atrial fibrillation 2/10/2020    Shingles 2015    Stenosis of aortic and mitral valves     Stroke     TIA (transient ischemic attack)     Use of cane as ambulatory aid        Past Surgical History:   Procedure Laterality Date    ABDOMINAL SURGERY      stents to SMA    angiocele      2007, 2014    AORTIC VALVE REPLACEMENT N/A 11/19/2019    Procedure: Replacement-valve-aortic;  Surgeon: Jack Capone MD;  Location: [facility] CATH LAB;  Service: Cardiology;  Laterality: N/A;    BREAST SURGERY      left--- a lump--- no cancer    CARDIAC VALVE SURGERY      COLONOSCOPY  2014    COLONOSCOPY N/A 3/14/2018    Procedure: COLONOSCOPY;  Surgeon: Efren Kemp MD;  Location: [facility] ENDO ([facility] FLR);  Service: Endoscopy;  Laterality: N/A;  ok to hold Plavix 5 days prior to procedure per Dr RESHMA Hernandez     ok per Dr Kemp to hold Eliquis 2 days prior to procedure (see telephone encounter dated-2/7/18)    HERNIA REPAIR      HYSTERECTOMY  partial    1982 partial hysterectomy    LEFT HEART CATHETERIZATION Right 11/5/2019    Procedure: Left heart cath;  Surgeon: Jack Capone MD;  Location: [facility] CATH LAB;  Service: Cardiology;  Laterality: Right;    left nasal polyp      left neck lymph node      nose polyp      right hip fatty mass tissue      stent to small intestine      SUPERIOR VENA CAVA ANGIOPLASTY / STENTING      TONSILLECTOMY      TOTAL ABDOMINAL HYSTERECTOMY      2014    TOTAL KNEE ARTHROPLASTY Bilateral     TREATMENT OF CARDIAC ARRHYTHMIA N/A 2/10/2020    Procedure: CARDIOVERSION;  Surgeon: Jayy Parrish MD;  Location: [facility] EP LAB;  Service:  Cardiology;  Laterality: N/A;  AF, PEDRO, DCCV, MAC, DM, 3 Prep    TREATMENT OF CARDIAC ARRHYTHMIA N/A 5/15/2020    Procedure: CARDIOVERSION;  Surgeon: Hany Bee MD;  Location: [facility] EP LAB;  Service: Cardiology;  Laterality: N/A;  AF, PEDRO, DCCV, MAC, DM, 3 Prep    UPPER GASTROINTESTINAL ENDOSCOPY  2014       Time Tracking:     PT Received On: 09/24/23  PT Start Time: 1010     PT Stop Time: 1055  PT Total Time (min): 45 min     Billable Minutes: Evaluation 5, Gait Training 23, and Therapeutic Activity 15

## 2023-09-24 NOTE — SUBJECTIVE & OBJECTIVE
Interval History: Currently on 2L. Diuresing well, net negative 1.2L overnight. Pt states SOB is improving, she denies any chest discomfort. Walked with PT     Review of Systems   Constitutional:  Negative for activity change, appetite change, chills, diaphoresis, fatigue and fever.   HENT:  Negative for congestion, postnasal drip, rhinorrhea, sinus pressure, sinus pain and sneezing.    Respiratory:  Positive for shortness of breath. Negative for cough, chest tightness, wheezing and stridor.    Cardiovascular:  Negative for palpitations and leg swelling.   Gastrointestinal:  Negative for abdominal distention, abdominal pain, blood in stool, constipation, diarrhea and nausea.   Endocrine: Negative for cold intolerance and heat intolerance.   Genitourinary:  Negative for dysuria, flank pain and hematuria.   Musculoskeletal:  Negative for gait problem.   Neurological:  Negative for dizziness and weakness.   Psychiatric/Behavioral:  Negative for agitation and behavioral problems.      Objective:     Vital Signs (Most Recent):  Temp: 98 °F (36.7 °C) (09/24/23 1057)  Pulse: 69 (09/24/23 1116)  Resp: 19 (09/24/23 1057)  BP: (!) 130/59 (09/24/23 1057)  SpO2: 96 % (09/24/23 1057) Vital Signs (24h Range):  Temp:  [98 °F (36.7 °C)-99 °F (37.2 °C)] 98 °F (36.7 °C)  Pulse:  [69-85] 69  Resp:  [18-20] 19  SpO2:  [95 %-98 %] 96 %  BP: (127-151)/(57-73) 130/59     Weight: 115 kg (253 lb 8.5 oz)  Body mass index is 43.52 kg/m².    Intake/Output Summary (Last 24 hours) at 9/24/2023 1246  Last data filed at 9/24/2023 0906  Gross per 24 hour   Intake --   Output 1750 ml   Net -1750 ml           Physical Exam  Constitutional:       General: She is not in acute distress.     Appearance: She is well-developed. She is not ill-appearing or toxic-appearing.   HENT:      Head: Normocephalic and atraumatic.   Eyes:      Pupils: Pupils are equal, round, and reactive to light.   Neck:      Vascular: No JVD.      Comments: +JVD  Cardiovascular:       Rate and Rhythm: Normal rate and regular rhythm.      Heart sounds: Normal heart sounds. No murmur heard.     No friction rub. No gallop.   Pulmonary:      Effort: Pulmonary effort is normal. No respiratory distress.      Breath sounds: No stridor. Rales (bibasilar) present. No wheezing.   Abdominal:      General: Bowel sounds are normal. There is no distension.      Palpations: Abdomen is soft.      Tenderness: There is no abdominal tenderness.   Musculoskeletal:         General: No tenderness. Normal range of motion.      Cervical back: Normal range of motion and neck supple.      Right lower leg: Edema (1+) present.      Left lower leg: Edema (1+) present.   Skin:     General: Skin is warm and dry.   Neurological:      General: No focal deficit present.      Mental Status: She is alert and oriented to person, place, and time. Mental status is at baseline.      Cranial Nerves: No cranial nerve deficit.   Psychiatric:         Behavior: Behavior normal.             Significant Labs: All pertinent labs within the past 24 hours have been reviewed.    Significant Imaging: I have reviewed all pertinent imaging results/findings within the past 24 hours.

## 2023-09-24 NOTE — ASSESSMENT & PLAN NOTE
Patient is identified as having Diastolic (HFpEF) heart failure that is Acute on chronic. CHF is currently improving. Latest ECHO performed and demonstrates- Results for orders placed during the hospital encounter of 09/30/22    Echo    Interpretation Summary  · The left ventricle is normal in size with concentric hypertrophy and normal systolic function.  · The estimated ejection fraction is 55%.  · Indeterminate left ventricular diastolic function.  · Normal right ventricular size with normal right ventricular systolic function.  · Mild left atrial enlargement.  · There is abnormal septal wall motion consistent with right ventricular pacemaker.  · Mild to moderate tricuspid regurgitation.  · There is a 26 mm Edward S3 transcutaneously-placed aortic bioprosthesis present. There is no aortic insufficiency present. Prosthetic aortic valve is normal.  · The aortic valve mean gradient is 7 mmHg with a dimensionless index of 0.51.  · Intermediate central venous pressure (8 mmHg).  · The estimated PA systolic pressure is 47 mmHg.  · There is pulmonary hypertension.  . Continue Beta Blocker, ACE/ARB and Furosemide and monitor clinical status closely. Monitor on telemetry. Patient is on CHF pathway.  Monitor strict Is&Os and daily weights.  Place on fluid restriction of 1.5 L. Cardiology has not been any consulted. Continue to stress to patient importance of self efficacy and  on diet for CHF. Last BNP reviewed- and noted below   Recent Labs   Lab 09/21/23  1250   *   .  Aortic stenosis s/p Porcine TAVR (on plavix)  - cont home meds coreg, diltiazem, imdur, ranolazine  - given 40mg IV lasix, lasix 40mg iv bid, strict I&Os, daily weights, plan to taper the dose tomorrow   - hypoxic in ED, placed on 2L NC with improvement of SOB/tachypnea  - , negative troponin  - TTE 9/21 showing EF of 55 - 60% with Grade III diastolic dysfunction.

## 2023-09-24 NOTE — ASSESSMENT & PLAN NOTE
Patient has hyponatremia which is uncontrolled,We will aim to correct the sodium by 4-6mEq in 24 hours. We will monitor sodium Daily. The hyponatremia is due to Heart Failure. We will obtain the following studies: Urine sodium, urine osmolality, serum osmolality. We will treat the hyponatremia with Fluid restriction of:  1.5 liter per day and lasix. The patient's sodium results have been reviewed and are listed below.  Recent Labs   Lab 09/24/23  0457   *     Improving with diuresis

## 2023-09-24 NOTE — PLAN OF CARE
Problem: Physical Therapy  Goal: Physical Therapy Goal  Description: Goals to be met by: 10/10/2023    Patient will increase functional independence with mobility by performin. Supine to sit with Supervision  2. Sit to stand transfer with Supervision using RW  3. Gait  x 50 feet with Supervision using RW.     Outcome: Ongoing, Progressing      Eval completed. Goals appropriate.

## 2023-09-24 NOTE — PROGRESS NOTES
[facility] Medicine  Progress Note    Patient Name: Adriana Strong  MRN: [id number]  Patient Class: IP- Inpatient   Admission Date: 9/21/2023  Length of Stay: 3 days  Attending Physician: Roberta Garcia MD  Primary Care Provider: Krzysztof Mcgraw MD        Subjective:     Principal Problem:Acute on chronic heart failure with preserved ejection fraction        HPI:  78F with PMH of COPD, HFpEF, Afib with PPM on eliquis, HTN, DM2, mesenteric ischemia who presents with c/o SOB since last night. SOB occurs while at rest and worse with exertion. She also noticed chest heaviness/tightness this AM. No known alleviating factors. Denies palpitations, diaphoresis, fever, chills, cough but has noticed mild wheezing. Not sure if there's been weight gain or swelling. No orthopnea, PND. No sick contacts. She is compliant with her meds, including lasix, but does admit to poor adherence to cardiac diet. She was started on macrobid on monday for UTI. Workup in ED remarkable for tachypnea/hypoxia requiring 2L NC, Hb 10.9, Na 128, Cl 89, , Trop neg, CXR with no acute findings. She received dose of iv lasix 40mg in ED. Will be admitted to hospital medicine service for further management.      Overview/Hospital Course:  Pt was admitted for Acute on chronic heart failure with preserved ejection fraction. Diuresing well with lasix 40 iv bid. Still requiring 2L NC . Seen by PT       Interval History: Currently on 2L. Diuresing well, net negative 1.2L overnight. Pt states SOB is improving, she denies any chest discomfort. Walked with PT     Review of Systems   Constitutional:  Negative for activity change, appetite change, chills, diaphoresis, fatigue and fever.   HENT:  Negative for congestion, postnasal drip, rhinorrhea, sinus pressure, sinus pain and sneezing.    Respiratory:  Positive for shortness of breath. Negative for cough, chest tightness, wheezing and stridor.    Cardiovascular:  Negative for  palpitations and leg swelling.   Gastrointestinal:  Negative for abdominal distention, abdominal pain, blood in stool, constipation, diarrhea and nausea.   Endocrine: Negative for cold intolerance and heat intolerance.   Genitourinary:  Negative for dysuria, flank pain and hematuria.   Musculoskeletal:  Negative for gait problem.   Neurological:  Negative for dizziness and weakness.   Psychiatric/Behavioral:  Negative for agitation and behavioral problems.      Objective:     Vital Signs (Most Recent):  Temp: 98 °F (36.7 °C) (09/24/23 1057)  Pulse: 69 (09/24/23 1116)  Resp: 19 (09/24/23 1057)  BP: (!) 130/59 (09/24/23 1057)  SpO2: 96 % (09/24/23 1057) Vital Signs (24h Range):  Temp:  [98 °F (36.7 °C)-99 °F (37.2 °C)] 98 °F (36.7 °C)  Pulse:  [69-85] 69  Resp:  [18-20] 19  SpO2:  [95 %-98 %] 96 %  BP: (127-151)/(57-73) 130/59     Weight: 115 kg (253 lb 8.5 oz)  Body mass index is 43.52 kg/m².    Intake/Output Summary (Last 24 hours) at 9/24/2023 1246  Last data filed at 9/24/2023 0906  Gross per 24 hour   Intake --   Output 1750 ml   Net -1750 ml           Physical Exam  Constitutional:       General: She is not in acute distress.     Appearance: She is well-developed. She is not ill-appearing or toxic-appearing.   HENT:      Head: Normocephalic and atraumatic.   Eyes:      Pupils: Pupils are equal, round, and reactive to light.   Neck:      Vascular: No JVD.      Comments: +JVD  Cardiovascular:      Rate and Rhythm: Normal rate and regular rhythm.      Heart sounds: Normal heart sounds. No murmur heard.     No friction rub. No gallop.   Pulmonary:      Effort: Pulmonary effort is normal. No respiratory distress.      Breath sounds: No stridor. Rales (bibasilar) present. No wheezing.   Abdominal:      General: Bowel sounds are normal. There is no distension.      Palpations: Abdomen is soft.      Tenderness: There is no abdominal tenderness.   Musculoskeletal:         General: No tenderness. Normal range of motion.       Cervical back: Normal range of motion and neck supple.      Right lower leg: Edema (1+) present.      Left lower leg: Edema (1+) present.   Skin:     General: Skin is warm and dry.   Neurological:      General: No focal deficit present.      Mental Status: She is alert and oriented to person, place, and time. Mental status is at baseline.      Cranial Nerves: No cranial nerve deficit.   Psychiatric:         Behavior: Behavior normal.             Significant Labs: All pertinent labs within the past 24 hours have been reviewed.    Significant Imaging: I have reviewed all pertinent imaging results/findings within the past 24 hours.      Assessment/Plan:      * Acute on chronic heart failure with preserved ejection fraction  Patient is identified as having Diastolic (HFpEF) heart failure that is Acute on chronic. CHF is currently improving. Latest ECHO performed and demonstrates- Results for orders placed during the hospital encounter of 09/30/22    Echo    Interpretation Summary  · The left ventricle is normal in size with concentric hypertrophy and normal systolic function.  · The estimated ejection fraction is 55%.  · Indeterminate left ventricular diastolic function.  · Normal right ventricular size with normal right ventricular systolic function.  · Mild left atrial enlargement.  · There is abnormal septal wall motion consistent with right ventricular pacemaker.  · Mild to moderate tricuspid regurgitation.  · There is a 26 mm Edward S3 transcutaneously-placed aortic bioprosthesis present. There is no aortic insufficiency present. Prosthetic aortic valve is normal.  · The aortic valve mean gradient is 7 mmHg with a dimensionless index of 0.51.  · Intermediate central venous pressure (8 mmHg).  · The estimated PA systolic pressure is 47 mmHg.  · There is pulmonary hypertension.  . Continue Beta Blocker, ACE/ARB and Furosemide and monitor clinical status closely. Monitor on telemetry. Patient is on CHF pathway.   Monitor strict Is&Os and daily weights.  Place on fluid restriction of 1.5 L. Cardiology has not been any consulted. Continue to stress to patient importance of self efficacy and  on diet for CHF. Last BNP reviewed- and noted below   Recent Labs   Lab 09/21/23  1250   *   .  Aortic stenosis s/p Porcine TAVR (on plavix)  - cont home meds coreg, diltiazem, imdur, ranolazine  - given 40mg IV lasix, lasix 40mg iv bid, strict I&Os, daily weights, plan to taper the dose tomorrow   - hypoxic in ED, placed on 2L NC with improvement of SOB/tachypnea  - , negative troponin  - TTE 9/21 showing EF of 55 - 60% with Grade III diastolic dysfunction.      Chest discomfort  Intermittent substernal chest pain possibly 2/2 chronic angina  On imdur and ranolazine  No acute findings on tele, ekg, tte  Trend trops        Acute cystitis with hematuria  Has complaint of dysuria, although UA appears noninfectious   Started on macrobid 9/18, discontinued      Hyponatremia  Patient has hyponatremia which is uncontrolled,We will aim to correct the sodium by 4-6mEq in 24 hours. We will monitor sodium Daily. The hyponatremia is due to Heart Failure. We will obtain the following studies: Urine sodium, urine osmolality, serum osmolality. We will treat the hyponatremia with Fluid restriction of:  1.5 liter per day and lasix. The patient's sodium results have been reviewed and are listed below.  Recent Labs   Lab 09/24/23  0457   *     Improving with diuresis    COPD (chronic obstructive pulmonary disease)  No signs of acute exacerbation  Cont home inhaler  Supp O2 PRN to keep O2 sat 88-92%  Duoneb PRN  May require 6MWT prior to discharge      Aortic stenosis  S/p TAVR      Persistent atrial fibrillation  Patient with Persistent (7 days or more) atrial fibrillation which is controlled currently with Beta Blocker. Patient is currently in atrial fibrillation.[id number] Score: 5. Anticoagulation indicated. Anticoagulation  done with eliquis.  PPM in place    Essential hypertension  Cont coreg and losartan      Chronic mesenteric ischemia  No acute issues  Monitor      GERD (gastroesophageal reflux disease)  Cont PPI      HLD (hyperlipidemia)  Cont statin      Type 2 diabetes mellitus  Patient's FSGs are controlled on current medication regimen.  Last A1c reviewed-   Lab Results   Component Value Date    HGBA1C 5.3 09/21/2023     Most recent fingerstick glucose reviewed-   Recent Labs   Lab 09/23/23  1558 09/23/23  2016 09/24/23  0808 09/24/23  1055   POCTGLUCOSE 122* 124* 124* 145*     Current correctional scale  Low  Maintain anti-hyperglycemic dose as follows-   Antihyperglycemics (From admission, onward)    Start     Stop Route Frequency Ordered    09/21/23 1705  insulin aspart U-100 pen 0-5 Units         -- SubQ Before meals & nightly PRN 09/21/23 1606        Hold Oral hypoglycemics while patient is in the hospital.      VTE Risk Mitigation (From admission, onward)         Ordered     apixaban tablet 5 mg  2 times daily         09/21/23 1606     IP VTE HIGH RISK PATIENT  Once         09/21/23 1606     Place sequential compression device  Until discontinued         09/21/23 1606     Reason for No Pharmacological VTE Prophylaxis  Once        Question:  Reasons:  Answer:  Already adequately anticoagulated on oral Anticoagulants    09/21/23 1606                Discharge Planning   MIMI:      Code Status: Full Code   Is the patient medically ready for discharge?: No    Reason for patient still in hospital (select all that apply): Treatment                     Roberta Garcia MD  Department of Hospital Medicine   [facility] Surg

## 2023-09-24 NOTE — ASSESSMENT & PLAN NOTE
Patient with Persistent (7 days or more) atrial fibrillation which is controlled currently with Beta Blocker. Patient is currently in atrial fibrillation.[id number] Score: 5. Anticoagulation indicated. Anticoagulation done with eliquis.  PPM in place

## 2023-09-25 LAB
ANION GAP SERPL CALC-SCNC: 9 MMOL/L (ref 8–16)
BUN SERPL-MCNC: 20 MG/DL (ref 8–23)
CALCIUM SERPL-MCNC: 8.9 MG/DL (ref 8.7–10.5)
CHLORIDE SERPL-SCNC: 92 MMOL/L (ref 95–110)
CO2 SERPL-SCNC: 34 MMOL/L (ref 23–29)
CREAT SERPL-MCNC: 0.7 MG/DL (ref 0.5–1.4)
EST. GFR  (NO RACE VARIABLE): >60 ML/MIN/1.73 M^2
GLUCOSE SERPL-MCNC: 118 MG/DL (ref 70–110)
POCT GLUCOSE: 107 MG/DL (ref 70–110)
POCT GLUCOSE: 128 MG/DL (ref 70–110)
POCT GLUCOSE: 145 MG/DL (ref 70–110)
POCT GLUCOSE: 147 MG/DL (ref 70–110)
POTASSIUM SERPL-SCNC: 3.7 MMOL/L (ref 3.5–5.1)
SODIUM SERPL-SCNC: 135 MMOL/L (ref 136–145)

## 2023-09-25 PROCEDURE — 36415 COLL VENOUS BLD VENIPUNCTURE: CPT | Performed by: INTERNAL MEDICINE

## 2023-09-25 PROCEDURE — 27000221 HC OXYGEN, UP TO 24 HOURS

## 2023-09-25 PROCEDURE — 25000003 PHARM REV CODE 250: Performed by: INTERNAL MEDICINE

## 2023-09-25 PROCEDURE — 97530 THERAPEUTIC ACTIVITIES: CPT

## 2023-09-25 PROCEDURE — 94761 N-INVAS EAR/PLS OXIMETRY MLT: CPT

## 2023-09-25 PROCEDURE — 80048 BASIC METABOLIC PNL TOTAL CA: CPT | Performed by: INTERNAL MEDICINE

## 2023-09-25 PROCEDURE — 21400001 HC TELEMETRY ROOM

## 2023-09-25 PROCEDURE — 63600175 PHARM REV CODE 636 W HCPCS: Performed by: INTERNAL MEDICINE

## 2023-09-25 PROCEDURE — 97166 OT EVAL MOD COMPLEX 45 MIN: CPT

## 2023-09-25 RX ORDER — FUROSEMIDE 10 MG/ML
40 INJECTION INTRAMUSCULAR; INTRAVENOUS ONCE
Status: COMPLETED | OUTPATIENT
Start: 2023-09-25 | End: 2023-09-25

## 2023-09-25 RX ADMIN — GABAPENTIN 600 MG: 300 CAPSULE ORAL at 02:09

## 2023-09-25 RX ADMIN — DILTIAZEM HYDROCHLORIDE 240 MG: 240 CAPSULE, COATED, EXTENDED RELEASE ORAL at 08:09

## 2023-09-25 RX ADMIN — POLYETHYLENE GLYCOL 3350 17 G: 17 POWDER, FOR SOLUTION ORAL at 08:09

## 2023-09-25 RX ADMIN — ISOSORBIDE MONONITRATE 60 MG: 60 TABLET, EXTENDED RELEASE ORAL at 08:09

## 2023-09-25 RX ADMIN — RANOLAZINE 1000 MG: 500 TABLET, EXTENDED RELEASE ORAL at 09:09

## 2023-09-25 RX ADMIN — CARVEDILOL 12.5 MG: 12.5 TABLET, FILM COATED ORAL at 09:09

## 2023-09-25 RX ADMIN — CETIRIZINE HYDROCHLORIDE 10 MG: 10 TABLET, FILM COATED ORAL at 08:09

## 2023-09-25 RX ADMIN — LOSARTAN POTASSIUM 25 MG: 25 TABLET, FILM COATED ORAL at 08:09

## 2023-09-25 RX ADMIN — CARVEDILOL 12.5 MG: 12.5 TABLET, FILM COATED ORAL at 08:09

## 2023-09-25 RX ADMIN — RANOLAZINE 1000 MG: 500 TABLET, EXTENDED RELEASE ORAL at 10:09

## 2023-09-25 RX ADMIN — SENNOSIDES AND DOCUSATE SODIUM 1 TABLET: 50; 8.6 TABLET ORAL at 09:09

## 2023-09-25 RX ADMIN — GABAPENTIN 600 MG: 300 CAPSULE ORAL at 09:09

## 2023-09-25 RX ADMIN — MONTELUKAST 10 MG: 10 TABLET, FILM COATED ORAL at 08:09

## 2023-09-25 RX ADMIN — CLOPIDOGREL BISULFATE 75 MG: 75 TABLET ORAL at 08:09

## 2023-09-25 RX ADMIN — FUROSEMIDE 40 MG: 10 INJECTION, SOLUTION INTRAVENOUS at 02:09

## 2023-09-25 RX ADMIN — FLUTICASONE FUROATE AND VILANTEROL TRIFENATATE 1 PUFF: 100; 25 POWDER RESPIRATORY (INHALATION) at 09:09

## 2023-09-25 RX ADMIN — PANTOPRAZOLE SODIUM 40 MG: 40 TABLET, DELAYED RELEASE ORAL at 08:09

## 2023-09-25 RX ADMIN — DULOXETINE HYDROCHLORIDE 20 MG: 20 CAPSULE, DELAYED RELEASE ORAL at 08:09

## 2023-09-25 RX ADMIN — SENNOSIDES AND DOCUSATE SODIUM 1 TABLET: 50; 8.6 TABLET ORAL at 08:09

## 2023-09-25 RX ADMIN — FUROSEMIDE 40 MG: 10 INJECTION, SOLUTION INTRAVENOUS at 04:09

## 2023-09-25 RX ADMIN — GABAPENTIN 600 MG: 300 CAPSULE ORAL at 08:09

## 2023-09-25 RX ADMIN — ATORVASTATIN CALCIUM 10 MG: 10 TABLET, FILM COATED ORAL at 08:09

## 2023-09-25 RX ADMIN — FUROSEMIDE 40 MG: 10 INJECTION, SOLUTION INTRAVENOUS at 03:09

## 2023-09-25 RX ADMIN — APIXABAN 5 MG: 5 TABLET, FILM COATED ORAL at 09:09

## 2023-09-25 RX ADMIN — LORAZEPAM 0.5 MG: 0.5 TABLET ORAL at 06:09

## 2023-09-25 RX ADMIN — APIXABAN 5 MG: 5 TABLET, FILM COATED ORAL at 08:09

## 2023-09-25 NOTE — ASSESSMENT & PLAN NOTE
Patient with Persistent (7 days or more) atrial fibrillation which is controlled currently with Beta Blocker. Patient is currently in atrial fibrillation.[id number] Score: 5. Anticoagulation indicated. Anticoagulation done with eliquis.  PPM in place

## 2023-09-25 NOTE — ASSESSMENT & PLAN NOTE
Patient has hyponatremia which is uncontrolled,We will aim to correct the sodium by 4-6mEq in 24 hours. We will monitor sodium Daily. The hyponatremia is due to Heart Failure. We will obtain the following studies: Urine sodium, urine osmolality, serum osmolality. We will treat the hyponatremia with Fluid restriction of:  1.5 liter per day and lasix. The patient's sodium results have been reviewed and are listed below.  Recent Labs   Lab 09/25/23  0606   *     Improving with diuresis

## 2023-09-25 NOTE — NURSING
Home Oxygen Evaluation    Date Performed: 9/25/2023    1) Patient's Home O2 Sat on room air, while at rest: 85%        If O2 sats on room air at rest are 88% or below, patient qualifies. No additional testing needed. Document N/A in steps 2 and 3. If 89% or above, complete steps 2.      2) Patient's O2 Sat on room air while exercising: N/A        If O2 sats on room air while exercising remain 89% or above patient does not qualify, no further testing needed Document N/A in step 3. If O2 sats on room air while exercising are 88% or below, continue to step 3.      3) Patient's O2 Sat while exercising on O2: N/A at N/A LPM         (Must show improvement from #2 for patients to qualify)    If O2 sats improve on oxygen, patient qualifies for portable oxygen. If not, the patient does not qualify.      MD Hand at bedside

## 2023-09-25 NOTE — PLAN OF CARE
Met with pt and pt's  to review discharge recommendation of HH and is agreeable to plan    Patient/family provided list of facilities in-network with patient's payor plan. Providers that are owned, operated, or affiliated with [facility] are included on the list.     Notified that referral sent to below listed facilities from in-network list based on proximity to home/family support:   [facility]    Patient/family instructed to identify preference.    Preferred Facility: (if more than 1, listed in order of descending preference)  [facility]     If an additional preferred facility not listed above is identified, additional referral to be sent. If above facilities unable to accept, will send additional referrals to in-network providers.     Referral for home O2 sent to ODME team for processing.       09/25/23 1521   Post-Acute Status   Post-Acute Authorization HME;Home Health   HME Status Referrals Sent   Home Health Status Referrals Sent   [facility]   Discharge Delays None known at this time   Discharge Plan   Discharge Plan A Home;Home with family;Home Health   Discharge Plan B Home;Home with family     Ciara Peter LMSW  PRN-  [facility]  Ext. [phone]

## 2023-09-25 NOTE — PROGRESS NOTES
[facility] Medicine  Progress Note    Patient Name: Adriana Strong  MRN: [id number]  Patient Class: IP- Inpatient   Admission Date: 9/21/2023  Length of Stay: 4 days  Attending Physician: Larry Agee MD  Primary Care Provider: Krzysztof Mcgraw MD        Subjective:     Principal Problem:Acute on chronic heart failure with preserved ejection fraction        HPI:  78F with PMH of COPD, HFpEF, Afib with PPM on eliquis, HTN, DM2, mesenteric ischemia who presents with c/o SOB since last night. SOB occurs while at rest and worse with exertion. She also noticed chest heaviness/tightness this AM. No known alleviating factors. Denies palpitations, diaphoresis, fever, chills, cough but has noticed mild wheezing. Not sure if there's been weight gain or swelling. No orthopnea, PND. No sick contacts. She is compliant with her meds, including lasix, but does admit to poor adherence to cardiac diet. She was started on macrobid on monday for UTI. Workup in ED remarkable for tachypnea/hypoxia requiring 2L NC, Hb 10.9, Na 128, Cl 89, , Trop neg, CXR with no acute findings. She received dose of iv lasix 40mg in ED. Will be admitted to hospital medicine service for further management.      Overview/Hospital Course:  Pt was admitted for Acute on chronic heart failure with preserved ejection fraction. Diuresing well with lasix 40 iv bid. Still requiring 2L NC . Seen by PT       Interval History: Currently saturating well on 2L. O2 sat drops to mid 80s on room air. Pt states SOB has improved, still c/o WHITLEY which is chronic.      Review of Systems   Constitutional:  Negative for activity change, appetite change, chills, diaphoresis, fatigue and fever.   HENT:  Negative for congestion, postnasal drip, rhinorrhea, sinus pressure, sinus pain and sneezing.    Respiratory:  Positive for shortness of breath. Negative for cough, chest tightness, wheezing and stridor.    Cardiovascular:  Negative for palpitations and  leg swelling.   Gastrointestinal:  Negative for abdominal distention, abdominal pain, blood in stool, constipation, diarrhea and nausea.   Endocrine: Negative for cold intolerance and heat intolerance.   Genitourinary:  Negative for dysuria, flank pain and hematuria.   Musculoskeletal:  Negative for gait problem.   Neurological:  Negative for dizziness and weakness.   Psychiatric/Behavioral:  Negative for agitation and behavioral problems.      Objective:     Vital Signs (Most Recent):  Temp: 98.7 °F (37.1 °C) (09/25/23 1150)  Pulse: 69 (09/25/23 1156)  Resp: 19 (09/25/23 1150)  BP: (!) 110/53 (09/25/23 1150)  SpO2: 96 % (09/25/23 1150) Vital Signs (24h Range):  Temp:  [97.5 °F (36.4 °C)-98.7 °F (37.1 °C)] 98.7 °F (37.1 °C)  Pulse:  [69-70] 69  Resp:  [18-20] 19  SpO2:  [94 %-97 %] 96 %  BP: (110-160)/(53-68) 110/53     Weight: 115 kg (253 lb 8.5 oz)  Body mass index is 43.52 kg/m².    Intake/Output Summary (Last 24 hours) at 9/25/2023 1419  Last data filed at 9/25/2023 1106  Gross per 24 hour   Intake --   Output 1400 ml   Net -1400 ml           Physical Exam  Constitutional:       General: She is not in acute distress.     Appearance: She is well-developed. She is not ill-appearing or toxic-appearing.   HENT:      Head: Normocephalic and atraumatic.   Eyes:      Pupils: Pupils are equal, round, and reactive to light.   Neck:      Vascular: No JVD.      Comments: +JVD  Cardiovascular:      Rate and Rhythm: Normal rate and regular rhythm.      Heart sounds: Normal heart sounds. No murmur heard.     No friction rub. No gallop.   Pulmonary:      Effort: Pulmonary effort is normal. No respiratory distress.      Breath sounds: No stridor. Rales (bibasilar) present. No wheezing.   Abdominal:      General: Bowel sounds are normal. There is no distension.      Palpations: Abdomen is soft.      Tenderness: There is no abdominal tenderness.   Musculoskeletal:         General: No tenderness. Normal range of motion.       Cervical back: Normal range of motion and neck supple.      Right lower leg: Edema (1+) present.      Left lower leg: Edema (1+) present.   Skin:     General: Skin is warm and dry.   Neurological:      General: No focal deficit present.      Mental Status: She is alert and oriented to person, place, and time. Mental status is at baseline.      Cranial Nerves: No cranial nerve deficit.   Psychiatric:         Behavior: Behavior normal.             Significant Labs: All pertinent labs within the past 24 hours have been reviewed.    Significant Imaging: I have reviewed all pertinent imaging results/findings within the past 24 hours.      Assessment/Plan:      * Acute on chronic heart failure with preserved ejection fraction  Patient is identified as having Diastolic (HFpEF) heart failure that is Acute on chronic. CHF is currently improving. Latest ECHO performed and demonstrates- Results for orders placed during the hospital encounter of 09/30/22    Echo    Interpretation Summary  · The left ventricle is normal in size with concentric hypertrophy and normal systolic function.  · The estimated ejection fraction is 55%.  · Indeterminate left ventricular diastolic function.  · Normal right ventricular size with normal right ventricular systolic function.  · Mild left atrial enlargement.  · There is abnormal septal wall motion consistent with right ventricular pacemaker.  · Mild to moderate tricuspid regurgitation.  · There is a 26 mm Edward S3 transcutaneously-placed aortic bioprosthesis present. There is no aortic insufficiency present. Prosthetic aortic valve is normal.  · The aortic valve mean gradient is 7 mmHg with a dimensionless index of 0.51.  · Intermediate central venous pressure (8 mmHg).  · The estimated PA systolic pressure is 47 mmHg.  · There is pulmonary hypertension.  . Continue Beta Blocker, ACE/ARB and Furosemide and monitor clinical status closely. Monitor on telemetry. Patient is on CHF pathway.   Monitor strict Is&Os and daily weights.  Place on fluid restriction of 1.5 L. Cardiology has not been any consulted. Continue to stress to patient importance of self efficacy and  on diet for CHF. Last BNP reviewed- and noted below   Recent Labs   Lab 09/21/23  1250   *   .  Aortic stenosis s/p Porcine TAVR (on plavix)  - cont home meds coreg, diltiazem, imdur, ranolazine  - given 40mg IV lasix, lasix 40mg iv bid, strict I&Os, daily weights, plan to taper the dose tomorrow   - hypoxic in ED, placed on 2L NC with improvement of SOB/tachypnea  - , negative troponin  - TTE 9/21 showing EF of 55 - 60% with Grade III diastolic dysfunction.  - cont iv lasix, can likely transition to PO tomorrow  - has qualified for home O2    Chest discomfort  Intermittent substernal chest pain possibly 2/2 chronic angina  On imdur and ranolazine  No acute findings on tele, ekg, tte        Acute cystitis with hematuria  Has complaint of dysuria, although UA appears noninfectious   Started on macrobid 9/18, discontinued      Hyponatremia  Patient has hyponatremia which is uncontrolled,We will aim to correct the sodium by 4-6mEq in 24 hours. We will monitor sodium Daily. The hyponatremia is due to Heart Failure. We will obtain the following studies: Urine sodium, urine osmolality, serum osmolality. We will treat the hyponatremia with Fluid restriction of:  1.5 liter per day and lasix. The patient's sodium results have been reviewed and are listed below.  Recent Labs   Lab 09/25/23  0606   *     Improving with diuresis    COPD (chronic obstructive pulmonary disease)  No signs of acute exacerbation  Cont home inhaler  Supp O2 PRN to keep O2 sat 88-92%  Duoneb PRN  6MWT performed, has qualified for home O2      Aortic stenosis  S/p TAVR      Persistent atrial fibrillation  Patient with Persistent (7 days or more) atrial fibrillation which is controlled currently with Beta Blocker. Patient is currently in atrial  fibrillation.[id number] Score: 5. Anticoagulation indicated. Anticoagulation done with eliquis.  PPM in place    Essential hypertension  Cont coreg and losartan      Chronic mesenteric ischemia  No acute issues  Monitor      GERD (gastroesophageal reflux disease)  Cont PPI      HLD (hyperlipidemia)  Cont statin      Type 2 diabetes mellitus  Patient's FSGs are controlled on current medication regimen.  Last A1c reviewed-   Lab Results   Component Value Date    HGBA1C 5.3 09/21/2023     Most recent fingerstick glucose reviewed-   Recent Labs   Lab 09/24/23  1533 09/24/23 2005 09/25/23  0816 09/25/23  1156   POCTGLUCOSE 113* 142* 107 147*     Current correctional scale  Low  Maintain anti-hyperglycemic dose as follows-   Antihyperglycemics (From admission, onward)    Start     Stop Route Frequency Ordered    09/21/23 1705  insulin aspart U-100 pen 0-5 Units         -- SubQ Before meals & nightly PRN 09/21/23 1606        Hold Oral hypoglycemics while patient is in the hospital.      VTE Risk Mitigation (From admission, onward)         Ordered     apixaban tablet 5 mg  2 times daily         09/21/23 1606     IP VTE HIGH RISK PATIENT  Once         09/21/23 1606     Place sequential compression device  Until discontinued         09/21/23 1606     Reason for No Pharmacological VTE Prophylaxis  Once        Question:  Reasons:  Answer:  Already adequately anticoagulated on oral Anticoagulants    09/21/23 1606                Discharge Planning   MIMI:      Code Status: Full Code   Is the patient medically ready for discharge?: No    Reason for patient still in hospital (select all that apply): Treatment                     Larry Agee MD  Department of Hospital Medicine   [facility] Surg

## 2023-09-25 NOTE — ASSESSMENT & PLAN NOTE
Patient is identified as having Diastolic (HFpEF) heart failure that is Acute on chronic. CHF is currently improving. Latest ECHO performed and demonstrates- Results for orders placed during the hospital encounter of 09/30/22    Echo    Interpretation Summary  · The left ventricle is normal in size with concentric hypertrophy and normal systolic function.  · The estimated ejection fraction is 55%.  · Indeterminate left ventricular diastolic function.  · Normal right ventricular size with normal right ventricular systolic function.  · Mild left atrial enlargement.  · There is abnormal septal wall motion consistent with right ventricular pacemaker.  · Mild to moderate tricuspid regurgitation.  · There is a 26 mm Edward S3 transcutaneously-placed aortic bioprosthesis present. There is no aortic insufficiency present. Prosthetic aortic valve is normal.  · The aortic valve mean gradient is 7 mmHg with a dimensionless index of 0.51.  · Intermediate central venous pressure (8 mmHg).  · The estimated PA systolic pressure is 47 mmHg.  · There is pulmonary hypertension.  . Continue Beta Blocker, ACE/ARB and Furosemide and monitor clinical status closely. Monitor on telemetry. Patient is on CHF pathway.  Monitor strict Is&Os and daily weights.  Place on fluid restriction of 1.5 L. Cardiology has not been any consulted. Continue to stress to patient importance of self efficacy and  on diet for CHF. Last BNP reviewed- and noted below   Recent Labs   Lab 09/21/23  1250   *   .  Aortic stenosis s/p Porcine TAVR (on plavix)  - cont home meds coreg, diltiazem, imdur, ranolazine  - given 40mg IV lasix, lasix 40mg iv bid, strict I&Os, daily weights, plan to taper the dose tomorrow   - hypoxic in ED, placed on 2L NC with improvement of SOB/tachypnea  - , negative troponin  - TTE 9/21 showing EF of 55 - 60% with Grade III diastolic dysfunction.  - cont iv lasix, can likely transition to PO tomorrow  - has qualified  for home O2

## 2023-09-25 NOTE — PLAN OF CARE
[facility] - Med Surg  Initial Discharge Assessment       Primary Care Provider: Krzysztof Mcgraw MD    Admission Diagnosis: SOB (shortness of breath) [R06.02]  Chest pain [R07.9]  Diastolic congestive heart failure [I50.30]  Acute on chronic congestive heart failure, unspecified heart failure type [I50.9]    Admission Date: 9/21/2023  Expected Discharge Date: 9/26/2023    Transition of Care Barriers: None    Payor: [employer]ARE / Plan: [employer] 65 / Product Type: Medicare Advantage /     Extended Emergency Contact Information  Primary Emergency Contact: Cindy Strongil  Address: [address] [place] [address] [facility]  Home Phone: [phone]  Mobile Phone: [phone]  Relation: Spouse  Secondary Emergency Contact: JustoTammy  Address: [facility] [place], [place] [id number] [place] [address]  Mobile Phone: [phone]  Relation: Daughter    Discharge Plan A: Home, Home with family, Home Health  Discharge Plan B: Home      [id number] [address] [facility] AT CORNER OF [address] [address]  Phone: [phone] Fax: [phone]      Initial Assessment (most recent)       Adult Discharge Assessment - 09/25/23 1501          Discharge Assessment    Assessment Type Discharge Planning Assessment     Confirmed/corrected address, phone number and insurance Yes     Confirmed Demographics Correct on Facesheet     Source of Information patient     Communicated MIMI with patient/caregiver Yes     Reason For Admission SOB     People in Home spouse     Do you expect to return to your current living situation? Yes     Do you have help at home or someone to help you manage your care at home? Yes     Who are your caregiver(s) and their phone number(s)? Anthony StrongZuuns-Takmql-[phone]     Prior to hospitilization cognitive status: Alert/Oriented     Current cognitive status: Alert/Oriented      Walking or Climbing Stairs ambulation difficulty, requires equipment     Mobility Management RW, Rollator, Wheelchair, Cane     Dressing/Bathing bathing difficulty, requires equipment     Dressing/Bathing Management BSC, Shower Chair     Equipment Currently Used at Home bedside commode;cane, straight;walker, rolling;wheelchair;shower chair;rollator     Readmission within 30 days? No     Patient currently being followed by outpatient case management? No     Do you currently have service(s) that help you manage your care at home? No     Do you take prescription medications? Yes     Do you have prescription coverage? Yes     Coverage [employer]     Do you have any problems affording any of your prescribed medications? No     Is the patient taking medications as prescribed? yes     Who is going to help you get home at discharge? Pt's  will provide transportation home.     How do you get to doctors appointments? family or friend will provide     Are you on dialysis? No     Do you take coumadin? No     DME Needed Upon Discharge  oxygen     Discharge Plan discussed with: Patient     Transition of Care Barriers None     Discharge Plan A Home;Home with family;Home Health     Discharge Plan B Home        Physical Activity    On average, how many days per week do you engage in moderate to strenuous exercise (like a brisk walk)? 0 days     On average, how many minutes do you engage in exercise at this level? 0 min        Financial Resource Strain    How hard is it for you to pay for the very basics like food, housing, medical care, and heating? Not hard at all        Housing Stability    In the last 12 months, was there a time when you were not able to pay the mortgage or rent on time? No     In the last 12 months, was there a time when you did not have a steady place to sleep or slept in a shelter (including now)? No        Transportation Needs    In the past 12 months, has lack of transportation kept you from  medical appointments or from getting medications? No     In the past 12 months, has lack of transportation kept you from meetings, work, or from getting things needed for daily living? No        Food Insecurity    Within the past 12 months, you worried that your food would run out before you got the money to buy more. Never true     Within the past 12 months, the food you bought just didn't last and you didn't have money to get more. Never true        Stress    Do you feel stress - tense, restless, nervous, or anxious, or unable to sleep at night because your mind is troubled all the time - these days? Only a little        Social Connections    In a typical week, how many times do you talk on the phone with family, friends, or neighbors? More than three times a week     How often do you get together with friends or relatives? More than three times a week     How often do you attend Christian or Anabaptist services? More than 4 times per year     Do you belong to any clubs or organizations such as Christian groups, unions, fraternal or athletic groups, or school groups? No     How often do you attend meetings of the clubs or organizations you belong to? Never     Are you , , , , never , or living with a partner?         Alcohol Use    Q1: How often do you have a drink containing alcohol? Never     Q2: How many drinks containing alcohol do you have on a typical day when you are drinking? Patient does not drink     Q3: How often do you have six or more drinks on one occasion? Never        OTHER    Name(s) of People in Home Anthony StrongLwgok-[phone]                    completed assessment with patient and pt's spouse (Anthony).  No hospital readmissions within the last 30 days.  Pt's  will provide transportation home.  Pt has [employer] insurance.     Pt lives with her .  Pt is mod assist with her mobility.  Pt independent with her ADLs.  Pt has the following DME at home:   cane, BSC, shower chair, wheelchair, RW, rollator.  Pt's family available to provide help at home.      Pt does not receive coumadin, dialysis, or HH services at this time.  Pt reported she is compliant with medications and does not have any issues paying for meds.     Disp:  Home w/ HH.  Family has PHN and does not have a preference on HH facility. Pt will need home O2.      Ciara Peter LMSW  PRN-  [facility]  Ext. [phone]

## 2023-09-25 NOTE — ASSESSMENT & PLAN NOTE
Patient's FSGs are controlled on current medication regimen.  Last A1c reviewed-   Lab Results   Component Value Date    HGBA1C 5.3 09/21/2023     Most recent fingerstick glucose reviewed-   Recent Labs   Lab 09/24/23  1533 09/24/23 2005 09/25/23  0816 09/25/23  1156   POCTGLUCOSE 113* 142* 107 147*     Current correctional scale  Low  Maintain anti-hyperglycemic dose as follows-   Antihyperglycemics (From admission, onward)    Start     Stop Route Frequency Ordered    09/21/23 1705  insulin aspart U-100 pen 0-5 Units         -- SubQ Before meals & nightly PRN 09/21/23 1606        Hold Oral hypoglycemics while patient is in the hospital.

## 2023-09-25 NOTE — SUBJECTIVE & OBJECTIVE
Interval History: Currently saturating well on 2L. O2 sat drops to mid 80s on room air. Pt states SOB has improved, still c/o WHITLEY which is chronic.      Review of Systems   Constitutional:  Negative for activity change, appetite change, chills, diaphoresis, fatigue and fever.   HENT:  Negative for congestion, postnasal drip, rhinorrhea, sinus pressure, sinus pain and sneezing.    Respiratory:  Positive for shortness of breath. Negative for cough, chest tightness, wheezing and stridor.    Cardiovascular:  Negative for palpitations and leg swelling.   Gastrointestinal:  Negative for abdominal distention, abdominal pain, blood in stool, constipation, diarrhea and nausea.   Endocrine: Negative for cold intolerance and heat intolerance.   Genitourinary:  Negative for dysuria, flank pain and hematuria.   Musculoskeletal:  Negative for gait problem.   Neurological:  Negative for dizziness and weakness.   Psychiatric/Behavioral:  Negative for agitation and behavioral problems.      Objective:     Vital Signs (Most Recent):  Temp: 98.7 °F (37.1 °C) (09/25/23 1150)  Pulse: 69 (09/25/23 1156)  Resp: 19 (09/25/23 1150)  BP: (!) 110/53 (09/25/23 1150)  SpO2: 96 % (09/25/23 1150) Vital Signs (24h Range):  Temp:  [97.5 °F (36.4 °C)-98.7 °F (37.1 °C)] 98.7 °F (37.1 °C)  Pulse:  [69-70] 69  Resp:  [18-20] 19  SpO2:  [94 %-97 %] 96 %  BP: (110-160)/(53-68) 110/53     Weight: 115 kg (253 lb 8.5 oz)  Body mass index is 43.52 kg/m².    Intake/Output Summary (Last 24 hours) at 9/25/2023 1419  Last data filed at 9/25/2023 1106  Gross per 24 hour   Intake --   Output 1400 ml   Net -1400 ml           Physical Exam  Constitutional:       General: She is not in acute distress.     Appearance: She is well-developed. She is not ill-appearing or toxic-appearing.   HENT:      Head: Normocephalic and atraumatic.   Eyes:      Pupils: Pupils are equal, round, and reactive to light.   Neck:      Vascular: No JVD.      Comments: +JVD  Cardiovascular:       Rate and Rhythm: Normal rate and regular rhythm.      Heart sounds: Normal heart sounds. No murmur heard.     No friction rub. No gallop.   Pulmonary:      Effort: Pulmonary effort is normal. No respiratory distress.      Breath sounds: No stridor. Rales (bibasilar) present. No wheezing.   Abdominal:      General: Bowel sounds are normal. There is no distension.      Palpations: Abdomen is soft.      Tenderness: There is no abdominal tenderness.   Musculoskeletal:         General: No tenderness. Normal range of motion.      Cervical back: Normal range of motion and neck supple.      Right lower leg: Edema (1+) present.      Left lower leg: Edema (1+) present.   Skin:     General: Skin is warm and dry.   Neurological:      General: No focal deficit present.      Mental Status: She is alert and oriented to person, place, and time. Mental status is at baseline.      Cranial Nerves: No cranial nerve deficit.   Psychiatric:         Behavior: Behavior normal.             Significant Labs: All pertinent labs within the past 24 hours have been reviewed.    Significant Imaging: I have reviewed all pertinent imaging results/findings within the past 24 hours.

## 2023-09-25 NOTE — ASSESSMENT & PLAN NOTE
No signs of acute exacerbation  Cont home inhaler  Supp O2 PRN to keep O2 sat 88-92%  Duoneb PRN  6MWT performed, has qualified for home O2

## 2023-09-25 NOTE — PLAN OF CARE
Problem: Adult Inpatient Plan of Care  Goal: Plan of Care Review  Outcome: Ongoing, Progressing  Goal: Patient-Specific Goal (Individualized)  Outcome: Ongoing, Progressing  Goal: Absence of Hospital-Acquired Illness or Injury  Outcome: Ongoing, Progressing  Goal: Optimal Comfort and Wellbeing  Outcome: Ongoing, Progressing  Goal: Readiness for Transition of Care  Outcome: Ongoing, Progressing     Problem: Bariatric Environmental Safety  Goal: Safety Maintained with Care  Outcome: Ongoing, Progressing     Problem: Diabetes Comorbidity  Goal: Blood Glucose Level Within Targeted Range  Outcome: Ongoing, Progressing     Problem: Fluid and Electrolyte Imbalance (Acute Kidney Injury/Impairment)  Goal: Fluid and Electrolyte Balance  Outcome: Ongoing, Progressing     Problem: Oral Intake Inadequate (Acute Kidney Injury/Impairment)  Goal: Optimal Nutrition Intake  Outcome: Ongoing, Progressing     Problem: Renal Function Impairment (Acute Kidney Injury/Impairment)  Goal: Effective Renal Function  Outcome: Ongoing, Progressing     Problem: Fall Injury Risk  Goal: Absence of Fall and Fall-Related Injury  Outcome: Ongoing, Progressing     Problem: Skin Injury Risk Increased  Goal: Skin Health and Integrity  Outcome: Ongoing, Progressing       Pt AAO x 4. Patient rested comfortably with no significant changes during the shift, remains free from falls and injuries. Side rails up x2, bed low and locked, call light and personal belongings within reach. VS remain stable and respirations even and unlabored.   No grimace, cries or other signs of distress. Questions and concerns addressed and answered. Medication compliance. Pt remains on continuous cardiac monitoring. Family remains at bedside. HOB and pillows adjusted for comfort. Reviewed importance of Safety barriers and calling for assistance prn. Verbalized understanding and states she will call prn for needs.

## 2023-09-25 NOTE — ASSESSMENT & PLAN NOTE
Intermittent substernal chest pain possibly 2/2 chronic angina  On imdur and ranolazine  No acute findings on tele, ekg, tte

## 2023-09-25 NOTE — PT/OT/SLP EVAL
Occupational Therapy   Evaluation and Tx    Name: Adriana Strong  MRN: [id number]  Admitting Diagnosis: Acute on chronic heart failure with preserved ejection fraction  Recent Surgery: * No surgery found *      Recommendations:     Discharge Recommendations: other (see comments)  Discharge Equipment Recommendations:  none  Barriers to discharge:  None    Assessment:     Adriana Strong is a 78 y.o. female with a medical diagnosis of Acute on chronic heart failure with preserved ejection fraction.  She presents with the following performance deficits affecting function: weakness, impaired endurance, impaired self care skills, impaired functional mobility, gait instability, impaired balance, decreased upper extremity function, impaired cardiopulmonary response to activity.  Pt presents with family at beside and recently transferred into bedside chair with staff (A).  PTA, pt was Mod I for mobility however reports increased WOB and weakness. She now requires increased (A) for all functional activities 2/2 aforementioned deficits, SOB, and is a fall risk at this time. Pt would benefit from continued skilled acute OT services in order to maximize (I) and with ADLs and functional mobility to ensure safe return to PLOF in the least restrictive environment. OT recommending further skilled therapy services once pt is medically appropriate for d/c.        Rehab Prognosis: Good; patient would benefit from acute skilled OT services to address these deficits and reach maximum level of function.       Plan:     Patient to be seen 4 x/week to address the above listed problems via self-care/home management, therapeutic activities, therapeutic exercises, neuromuscular re-education  Plan of Care Expires: 10/25/23  Plan of Care Reviewed with: patient, daughter, spouse    Subjective     Chief Complaint: SOB   Patient/Family Comments/goals: Return to PLOF     Occupational Profile:  Living Environment: Pt lives with spouse in a 2 ,  however bed/bath downstairs. Pt has a threshold ESTER and WIS with chair.   Previous level of function: Mod I with rollator for household distance, utilized a w/c for community mobility (doctors appointments)  Roles and Routines: Pt was not driving and enjoys playing games, listening to music, bingo  Equipment Used at Home: bedside commode, grab bar, rollator, shower chair  Assistance upon Discharge: Family     Pain/Comfort:  Pain Rating 1: 0/10  Pain Rating Post-Intervention 1: 0/10    Patients cultural, spiritual, Sikh conflicts given the current situation: no    Objective:     Communicated with: RN prior to session.  Patient found up in chair with telemetry, peripheral IV, oxygen upon OT entry to room.    General Precautions: Standard, fall  Orthopedic Precautions: N/A  Braces: N/A  Respiratory Status: Nasal cannula, flow 2 L/min    Occupational Performance:    Bed Mobility:    Pt found and left in bedside chair     Functional Mobility/Transfers:  Patient completed Sit <> Stand Transfer with minimum assistance  with  rolling walker   Functional Mobility: Pt demo ability to perform static marches 2 sets of 10 with CGA for safety. Pt demo decreased clearance of feet and required cues for facilitating upright posture, proper breathing techniques, and pushing through BUE for increased support. Pt demo decreased endurance and reported becoming SOB.     Activities of Daily Living:  Lower Body Dressing: pt presents with socks donned. Reports she required some assistance for socks/shoes at baseline    Toileting: No needs at this time. Purewick donned.       Cognitive/Visual Perceptual:  Cognitive/Psychosocial Skills:     -       Oriented to: Person, Place, Time, and Situation   -       Follows Commands/attention:Follows one-step commands  -       Communication: clear/fluent  -       Safety awareness/insight to disability: intact   Visual/Perceptual:      -Intact pt presents with glasses     Physical Exam:  Balance:     -           Static sitting balance: SPV   - Static standing balance: CGA with RW  - Dynamic standing balance:  CGA with RW   Postural examination/scapula alignment:    -       Rounded shoulders  Skin integrity: Visible skin intact  Edema:  None noted  Upper Extremity Range of Motion:     -       Right Upper Extremity: WFL  -       Left Upper Extremity: WFL  Upper Extremity Strength:    -       Right Upper Extremity: Fair+  -       Left Upper Extremity: Fair +   Strength:    -       Right Upper Extremity: WFL  -       Left Upper Extremity: WFL  Fine Motor Coordination:    -       Intact  Left hand thumb/finger opposition skills and Right hand thumb/finger opposition skills    AMPAC 6 Click ADL:  AMPAC Total Score: 18    Treatment & Education:   Pt and family educated on:   Role of OT, POC, and d/c planning.   Safe transfer techniques and proper body mechanics for fall prevention and improved independence with functional transfers   Pt required cues throughout for proper hand placement and RW management   Various therex pt can perform outside of therapy session to increase functional endurance and strength for overall improved independence in occupations of choice.   BUE strengthening and utilizing common household items such as water bottle/can good for increased resistance   Proper breathing techniques for improved functional endurance and energy conservation   Importance of OOB activities to increase endurance and tolerance for increased participation in daily ADLs.   Utilizing the call bell to request for assistance with all functional mobility to ensure safety during hospital stay.      Pt and family verbalized understanding and all questions were addressed within the scope of OT.       Patient left up in chair with all lines intact, call button in reach, and family present    GOALS:   Multidisciplinary Problems       Occupational Therapy Goals          Problem: Occupational Therapy    Goal Priority  Disciplines Outcome Interventions   Occupational Therapy Goal     OT, PT/OT Ongoing, Progressing    Description: Goals to be met by: 10/9/2023     Patient will increase functional independence with ADLs by performing:    UE Dressing with Modified [place].  Grooming while seated at sink with Modified [place].  Toileting from bedside commode with Supervision for hygiene and clothing management.   Step transfer with Supervision  Toilet transfer to bedside commode with Supervision.  Upper extremity exercise program x10 reps per handout, with independence.                         History:     Past Medical History:   Diagnosis Date    Acute on chronic diastolic (congestive) heart failure 11/20/2019    Anticoagulant long-term use     on Plavix since May 2015    Anxiety     Arthritis     Asthma     Atrial fibrillation     Atrial fibrillation with RVR 10/19/2020    Cataracts, bilateral     Chest pain, atypical     Chronic atrial fibrillation with rapid ventricular response 6/23/2017    Colon polyp     Coronary artery disease     Diabetes mellitus     Dry eyes     Esophageal erosions     GERD (gastroesophageal reflux disease)     Heart murmur     Hemorrhoid     High cholesterol     Hypertension     Irritable bowel syndrome     Paroxysmal atrial fibrillation 10/30/2020    Persistent atrial fibrillation 2/10/2020    Shingles 2015    Stenosis of aortic and mitral valves     Stroke     TIA (transient ischemic attack)     Use of cane as ambulatory aid          Past Surgical History:   Procedure Laterality Date    ABDOMINAL SURGERY      stents to SMA    angiocele      2007, 2014    AORTIC VALVE REPLACEMENT N/A 11/19/2019    Procedure: Replacement-valve-aortic;  Surgeon: Jack Capone MD;  Location: [facility] CATH LAB;  Service: Cardiology;  Laterality: N/A;    BREAST SURGERY      left--- a lump--- no cancer    CARDIAC VALVE SURGERY      COLONOSCOPY  2014    COLONOSCOPY N/A 3/14/2018    Procedure: COLONOSCOPY;  Surgeon:  Efren Kemp MD;  Location: [facility] ENDO (4TH FLR);  Service: Endoscopy;  Laterality: N/A;  ok to hold Plavix 5 days prior to procedure per Dr RESHMA Hernandez     ok per Dr Kemp to hold Eliquis 2 days prior to procedure (see telephone encounter dated-2/7/18)    HERNIA REPAIR      HYSTERECTOMY  partial    1982 partial hysterectomy    LEFT HEART CATHETERIZATION Right 11/5/2019    Procedure: Left heart cath;  Surgeon: Jack Capone MD;  Location: [facility] CATH LAB;  Service: Cardiology;  Laterality: Right;    left nasal polyp      left neck lymph node      nose polyp      right hip fatty mass tissue      stent to small intestine      SUPERIOR VENA CAVA ANGIOPLASTY / STENTING      TONSILLECTOMY      TOTAL ABDOMINAL HYSTERECTOMY      2014    TOTAL KNEE ARTHROPLASTY Bilateral     TREATMENT OF CARDIAC ARRHYTHMIA N/A 2/10/2020    Procedure: CARDIOVERSION;  Surgeon: Jayy Parrish MD;  Location: [facility] EP LAB;  Service: Cardiology;  Laterality: N/A;  AF, PEDRO, DCCV, MAC, DM, 3 Prep    TREATMENT OF CARDIAC ARRHYTHMIA N/A 5/15/2020    Procedure: CARDIOVERSION;  Surgeon: Hany Bee MD;  Location: [facility] EP LAB;  Service: Cardiology;  Laterality: N/A;  AF, PEDRO, DCCV, MAC, DM, 3 Prep    UPPER GASTROINTESTINAL ENDOSCOPY  2014       Time Tracking:     OT Date of Treatment: 09/25/23  OT Start Time: 0938  OT Stop Time: 1007  OT Total Time (min): 29 min    Billable Minutes:Evaluation 14  Therapeutic Activity 25 9/25/2023

## 2023-09-26 VITALS
RESPIRATION RATE: 18 BRPM | OXYGEN SATURATION: 100 % | TEMPERATURE: 98 F | WEIGHT: 253.5 LBS | BODY MASS INDEX: 43.28 KG/M2 | HEIGHT: 64 IN | HEART RATE: 65 BPM | SYSTOLIC BLOOD PRESSURE: 135 MMHG | DIASTOLIC BLOOD PRESSURE: 59 MMHG

## 2023-09-26 PROBLEM — J96.01 ACUTE HYPOXEMIC RESPIRATORY FAILURE: Status: ACTIVE | Noted: 2023-09-26

## 2023-09-26 LAB
ANION GAP SERPL CALC-SCNC: 8 MMOL/L (ref 8–16)
BUN SERPL-MCNC: 20 MG/DL (ref 8–23)
CALCIUM SERPL-MCNC: 8.8 MG/DL (ref 8.7–10.5)
CHLORIDE SERPL-SCNC: 90 MMOL/L (ref 95–110)
CO2 SERPL-SCNC: 37 MMOL/L (ref 23–29)
CREAT SERPL-MCNC: 0.6 MG/DL (ref 0.5–1.4)
EST. GFR  (NO RACE VARIABLE): >60 ML/MIN/1.73 M^2
GLUCOSE SERPL-MCNC: 117 MG/DL (ref 70–110)
POCT GLUCOSE: 129 MG/DL (ref 70–110)
POCT GLUCOSE: 147 MG/DL (ref 70–110)
POTASSIUM SERPL-SCNC: 3.9 MMOL/L (ref 3.5–5.1)
SODIUM SERPL-SCNC: 135 MMOL/L (ref 136–145)

## 2023-09-26 PROCEDURE — 36415 COLL VENOUS BLD VENIPUNCTURE: CPT | Performed by: INTERNAL MEDICINE

## 2023-09-26 PROCEDURE — 27000221 HC OXYGEN, UP TO 24 HOURS

## 2023-09-26 PROCEDURE — 63600175 PHARM REV CODE 636 W HCPCS: Performed by: INTERNAL MEDICINE

## 2023-09-26 PROCEDURE — 25000003 PHARM REV CODE 250: Performed by: INTERNAL MEDICINE

## 2023-09-26 PROCEDURE — 94640 AIRWAY INHALATION TREATMENT: CPT

## 2023-09-26 PROCEDURE — 99900035 HC TECH TIME PER 15 MIN (STAT)

## 2023-09-26 PROCEDURE — 97116 GAIT TRAINING THERAPY: CPT | Mod: CQ

## 2023-09-26 PROCEDURE — 94761 N-INVAS EAR/PLS OXIMETRY MLT: CPT

## 2023-09-26 PROCEDURE — 80048 BASIC METABOLIC PNL TOTAL CA: CPT | Performed by: INTERNAL MEDICINE

## 2023-09-26 RX ORDER — ATORVASTATIN CALCIUM 10 MG/1
10 TABLET, FILM COATED ORAL DAILY
Qty: 30 TABLET | Refills: 0 | Status: SHIPPED | OUTPATIENT
Start: 2023-09-26 | End: 2024-09-25

## 2023-09-26 RX ORDER — DULOXETIN HYDROCHLORIDE 20 MG/1
20 CAPSULE, DELAYED RELEASE ORAL DAILY
Qty: 30 CAPSULE | Refills: 0 | Status: SHIPPED | OUTPATIENT
Start: 2023-09-26 | End: 2024-09-25

## 2023-09-26 RX ORDER — METFORMIN HYDROCHLORIDE 500 MG/1
500 TABLET ORAL 2 TIMES DAILY
Qty: 60 TABLET | Refills: 0 | Status: SHIPPED | OUTPATIENT
Start: 2023-09-26 | End: 2024-09-25

## 2023-09-26 RX ORDER — FUROSEMIDE 40 MG/1
40 TABLET ORAL 2 TIMES DAILY
Qty: 60 TABLET | Refills: 0 | Status: SHIPPED | OUTPATIENT
Start: 2023-09-26 | End: 2023-10-17

## 2023-09-26 RX ORDER — OXYBUTYNIN CHLORIDE 5 MG/1
5 TABLET, EXTENDED RELEASE ORAL EVERY OTHER DAY
Qty: 15 TABLET | Refills: 0 | Status: ON HOLD | OUTPATIENT
Start: 2023-09-26 | End: 2024-02-11

## 2023-09-26 RX ORDER — ISOSORBIDE MONONITRATE 60 MG/1
60 TABLET, EXTENDED RELEASE ORAL DAILY
Qty: 30 TABLET | Refills: 0 | Status: SHIPPED | OUTPATIENT
Start: 2023-09-26 | End: 2024-09-25

## 2023-09-26 RX ORDER — LOSARTAN POTASSIUM 25 MG/1
25 TABLET ORAL DAILY
Qty: 30 TABLET | Refills: 0 | Status: SHIPPED | OUTPATIENT
Start: 2023-09-26 | End: 2023-12-18

## 2023-09-26 RX ADMIN — PANTOPRAZOLE SODIUM 40 MG: 40 TABLET, DELAYED RELEASE ORAL at 09:09

## 2023-09-26 RX ADMIN — CARVEDILOL 12.5 MG: 12.5 TABLET, FILM COATED ORAL at 09:09

## 2023-09-26 RX ADMIN — CETIRIZINE HYDROCHLORIDE 10 MG: 10 TABLET, FILM COATED ORAL at 09:09

## 2023-09-26 RX ADMIN — FLUTICASONE FUROATE AND VILANTEROL TRIFENATATE 1 PUFF: 100; 25 POWDER RESPIRATORY (INHALATION) at 12:09

## 2023-09-26 RX ADMIN — RANOLAZINE 1000 MG: 500 TABLET, EXTENDED RELEASE ORAL at 09:09

## 2023-09-26 RX ADMIN — ATORVASTATIN CALCIUM 10 MG: 10 TABLET, FILM COATED ORAL at 09:09

## 2023-09-26 RX ADMIN — SENNOSIDES AND DOCUSATE SODIUM 1 TABLET: 50; 8.6 TABLET ORAL at 09:09

## 2023-09-26 RX ADMIN — MONTELUKAST 10 MG: 10 TABLET, FILM COATED ORAL at 09:09

## 2023-09-26 RX ADMIN — GABAPENTIN 600 MG: 300 CAPSULE ORAL at 02:09

## 2023-09-26 RX ADMIN — APIXABAN 5 MG: 5 TABLET, FILM COATED ORAL at 09:09

## 2023-09-26 RX ADMIN — POLYETHYLENE GLYCOL 3350 17 G: 17 POWDER, FOR SOLUTION ORAL at 09:09

## 2023-09-26 RX ADMIN — ISOSORBIDE MONONITRATE 60 MG: 60 TABLET, EXTENDED RELEASE ORAL at 09:09

## 2023-09-26 RX ADMIN — GABAPENTIN 600 MG: 300 CAPSULE ORAL at 09:09

## 2023-09-26 RX ADMIN — OXYBUTYNIN CHLORIDE 5 MG: 5 TABLET, EXTENDED RELEASE ORAL at 09:09

## 2023-09-26 RX ADMIN — DULOXETINE HYDROCHLORIDE 20 MG: 20 CAPSULE, DELAYED RELEASE ORAL at 09:09

## 2023-09-26 RX ADMIN — LORAZEPAM 0.5 MG: 0.5 TABLET ORAL at 06:09

## 2023-09-26 RX ADMIN — DILTIAZEM HYDROCHLORIDE 240 MG: 240 CAPSULE, COATED, EXTENDED RELEASE ORAL at 09:09

## 2023-09-26 RX ADMIN — LOSARTAN POTASSIUM 25 MG: 25 TABLET, FILM COATED ORAL at 09:09

## 2023-09-26 RX ADMIN — CLOPIDOGREL BISULFATE 75 MG: 75 TABLET ORAL at 09:09

## 2023-09-26 RX ADMIN — FUROSEMIDE 40 MG: 10 INJECTION, SOLUTION INTRAVENOUS at 06:09

## 2023-09-26 NOTE — ASSESSMENT & PLAN NOTE
Patient's FSGs are controlled on current medication regimen.  Last A1c reviewed-   Lab Results   Component Value Date    HGBA1C 5.3 09/21/2023     Most recent fingerstick glucose reviewed-   Recent Labs   Lab 09/25/23  1626 09/25/23  1959 09/26/23  0758 09/26/23  1044   POCTGLUCOSE 128* 145* 129* 147*     Current correctional scale  Low  Maintain anti-hyperglycemic dose as follows-   Antihyperglycemics (From admission, onward)    Start     Stop Route Frequency Ordered    09/21/23 1705  insulin aspart U-100 pen 0-5 Units         -- SubQ Before meals & nightly PRN 09/21/23 1606        Hold Oral hypoglycemics while patient is in the hospital.

## 2023-09-26 NOTE — PLAN OF CARE
Problem: Adult Inpatient Plan of Care  Goal: Plan of Care Review  Outcome: Met  Goal: Patient-Specific Goal (Individualized)  Outcome: Met  Goal: Absence of Hospital-Acquired Illness or Injury  Outcome: Met  Goal: Optimal Comfort and Wellbeing  Outcome: Met  Goal: Readiness for Transition of Care  Outcome: Met     Problem: Bariatric Environmental Safety  Goal: Safety Maintained with Care  Outcome: Met     Problem: Diabetes Comorbidity  Goal: Blood Glucose Level Within Targeted Range  Outcome: Met     Problem: Fluid and Electrolyte Imbalance (Acute Kidney Injury/Impairment)  Goal: Fluid and Electrolyte Balance  Outcome: Met     Problem: Oral Intake Inadequate (Acute Kidney Injury/Impairment)  Goal: Optimal Nutrition Intake  Outcome: Met     Problem: Renal Function Impairment (Acute Kidney Injury/Impairment)  Goal: Effective Renal Function  Outcome: Met     Problem: Fall Injury Risk  Goal: Absence of Fall and Fall-Related Injury  Outcome: Met     Problem: Skin Injury Risk Increased  Goal: Skin Health and Integrity  Outcome: Met     Problem: Physical Therapy  Goal: Physical Therapy Goal  Description: Goals to be met by: 10/10/2023    Patient will increase functional independence with mobility by performin. Supine to sit with Supervision  2. Sit to stand transfer with Supervision using RW  3. Gait  x 50 feet with Supervision using RW.     Outcome: Met     Problem: Occupational Therapy  Goal: Occupational Therapy Goal  Description: Goals to be met by: 10/9/2023     Patient will increase functional independence with ADLs by performing:    UE Dressing with Modified [place].  Grooming while seated at sink with Modified [place].  Toileting from bedside commode with Supervision for hygiene and clothing management.   Step transfer with Supervision  Toilet transfer to bedside commode with Supervision.  Upper extremity exercise program x10 reps per handout, with independence.    Outcome: Met

## 2023-09-26 NOTE — PT/OT/SLP PROGRESS
Occupational Therapy      Patient Name:  Adriana Strong   MRN:  [id number]    Patient not seen today secondary to Other (Comment) (Pt with discharge orders in place to leave facility). Will follow-up per POC.    9/26/2023

## 2023-09-26 NOTE — PLAN OF CARE
[facility]      HOME HEALTH ORDERS  FACE TO FACE ENCOUNTER    Patient Name: Adriana Strong  YOB: 1945    PCP: Krzysztof Mcgraw MD   PCP Address: [address] [facility] [address] / [place] [address]  PCP Phone Number: [phone]  PCP Fax: [phone]    Encounter Date: 9/21/23    Admit to Home Health    Diagnoses:  Active Hospital Problems    Diagnosis  POA    *Acute on chronic heart failure with preserved ejection fraction [I50.33]  Yes    Chest discomfort [R07.89]  Yes    Acute cystitis with hematuria [N30.01]  Yes    Hyponatremia [E87.1]  Yes    COPD (chronic obstructive pulmonary disease) [J44.9]  Yes    Aortic stenosis [I35.0]  Yes    Persistent atrial fibrillation [I48.19]  Yes    Essential hypertension [I10]  Yes    Chronic mesenteric ischemia [K55.1]  Yes    Type 2 diabetes mellitus [E11.9]  Yes     A1C 7.0      HLD (hyperlipidemia) [E78.5]  Yes    GERD (gastroesophageal reflux disease) [K21.9]  Yes      Resolved Hospital Problems   No resolved problems to display.       Follow Up Appointments:  Future Appointments   Date Time Provider Department Center   10/27/2023 12:30 PM CARDIAC, PET IMAGING [facility] CARDPET [facility]   10/27/2023  1:45 PM ECHO, [facility] ECHOSTR [facility]   12/15/2023  9:30 AM Nate Landis MD [facility] CARDIO [facility]       Allergies:  Review of patient's allergies indicates:   Allergen Reactions    Celebrex [celecoxib] Shortness Of Breath    Ciprofloxacin Swelling     lip    Dicyclomine Hives    Adhesive Dermatitis    Avelox [moxifloxacin] Swelling    Cilostazol Other (See Comments)     Elevates blood pressure    Eryc [erythromycin] Other (See Comments)     unknown    Iodine and iodide containing products Hives    Keflex [cephalexin] Hives    Meclomen      rashes    Meloxicam      Ear ringing    Metoclopramide Other (See Comments)     High blood pressure    Sulfa (sulfonamide antibiotics) Itching       Medications: Review discharge medications with patient and  family and provide education.    Current Facility-Administered Medications   Medication Dose Route Frequency Provider Last Rate Last Admin    acetaminophen tablet 650 mg  650 mg Oral Q4H PRN Larry Agee MD        albuterol-ipratropium 2.5 mg-0.5 mg/3 mL nebulizer solution 3 mL  3 mL Nebulization Q6H PRN Larry Agee MD        aluminum-magnesium hydroxide-simethicone 200-200-20 mg/5 mL suspension 30 mL  30 mL Oral QID PRN Larry Agee MD        apixaban tablet 5 mg  5 mg Oral BID Hand, Larry MENDOZA MD   5 mg at 09/25/23 2118    atorvastatin tablet 10 mg  10 mg Oral Daily Hand, Larry MENDOZA MD   10 mg at 09/25/23 0845    carvediloL tablet 12.5 mg  12.5 mg Oral BID Hand, Larry MENDOZA MD   12.5 mg at 09/25/23 2118    cetirizine tablet 10 mg  10 mg Oral Daily Hand, Larry MENDOZA MD   10 mg at 09/25/23 0845    cloNIDine tablet 0.1 mg  0.1 mg Oral Q6H PRN HandLarry MD        clopidogreL tablet 75 mg  75 mg Oral Daily Hand, Larry MENDOZA MD   75 mg at 09/25/23 0844    dextrose 10% bolus 125 mL 125 mL  12.5 g Intravenous PRN Larry Agee MD        dextrose 10% bolus 250 mL 250 mL  25 g Intravenous PRN Larry Agee MD        diltiaZEM 24 hr capsule 240 mg  240 mg Oral Daily HandLarry MD   240 mg at 09/25/23 0845    DULoxetine DR capsule 20 mg  20 mg Oral Daily HandLarry MD   20 mg at 09/25/23 0845    fluticasone furoate-vilanteroL 100-25 mcg/dose diskus inhaler 1 puff  1 puff Inhalation Daily Larry Agee MD   1 puff at 09/25/23 0907    furosemide injection 40 mg  40 mg Intravenous Q12H Larry Agee MD   40 mg at 09/26/23 0624    gabapentin capsule 600 mg  600 mg Oral TID Hand, Larry MENDOZA MD   600 mg at 09/25/23 2118    glucagon (human recombinant) injection 1 mg  1 mg Intramuscular PRN Anuel, Larry MENDOZA MD        glucose chewable tablet 16 g  16 g Oral PRN Larry Agee MD        glucose chewable tablet 24 g  24 g Oral PRN Larry Agee MD        insulin aspart  U-100 pen 0-5 Units  0-5 Units Subcutaneous QID (AC + HS) PRN Hand, Larry MENDOZA MD        isosorbide mononitrate 24 hr tablet 60 mg  60 mg Oral Daily HandLarry MD   60 mg at 09/25/23 0845    LORazepam tablet 0.5 mg  0.5 mg Oral QAM HandLarry MD   0.5 mg at 09/26/23 0623    losartan tablet 25 mg  25 mg Oral Daily Hand, Larry MENDOZA MD   25 mg at 09/25/23 0845    melatonin tablet 6 mg  6 mg Oral Nightly PRN HandLarry MD        montelukast tablet 10 mg  10 mg Oral Daily Hand, Larry MENDOZA MD   10 mg at 09/25/23 0845    naloxone 0.4 mg/mL injection 0.02 mg  0.02 mg Intravenous PRN Hand, Larry MENDOZA MD        nitroGLYCERIN SL tablet 0.4 mg  0.4 mg Sublingual Q5 Min PRN HandLarry MD        ondansetron injection 4 mg  4 mg Intravenous Q8H PRN HandLarry MD        oxybutynin 24 hr tablet 5 mg  5 mg Oral Every other day Hand, Larry MENDOZA MD   5 mg at 09/24/23 0841    pantoprazole EC tablet 40 mg  40 mg Oral Daily Hand, Larry MENDOZA MD   40 mg at 09/25/23 0845    polyethylene glycol packet 17 g  17 g Oral Daily Roberta Garcia MD   17 g at 09/25/23 0844    ranolazine 12 hr tablet 1,000 mg  1,000 mg Oral BID HandLarry MD   1,000 mg at 09/25/23 2117    senna-docusate 8.6-50 mg per tablet 1 tablet  1 tablet Oral BID HandLarry MD   1 tablet at 09/25/23 2118    simethicone chewable tablet 80 mg  1 tablet Oral QID PRN Hand, Larry MENDOZA MD        sodium chloride 0.9% flush 10 mL  10 mL Intravenous Q12H PRN Larry Agee MD        sodium chloride 0.9% flush 10 mL  10 mL Intravenous PRN HandLarry MD         Facility-Administered Medications Ordered in Other Encounters   Medication Dose Route Frequency Provider Last Rate Last Admin    0.9%  NaCl infusion   Intravenous Continuous Lynette Hightower NP   New Bag at 02/10/20 1029    sodium chloride 0.9% flush 5 mL  5 mL Intravenous PRN Lynette Hightower NP         Current Discharge Medication List        CONTINUE  these medications which have CHANGED    Details   atorvastatin (LIPITOR) 10 MG tablet Take 1 tablet (10 mg total) by mouth once daily.  Qty: 30 tablet, Refills: 0      DULoxetine (CYMBALTA) 20 MG capsule Take 1 capsule (20 mg total) by mouth once daily.  Qty: 30 capsule, Refills: 0      furosemide (LASIX) 40 MG tablet Take 1 tablet (40 mg total) by mouth 2 (two) times a day.  Qty: 60 tablet, Refills: 0    Associated Diagnoses: Essential hypertension      isosorbide mononitrate (IMDUR) 60 MG 24 hr tablet Take 1 tablet (60 mg total) by mouth once daily.  Qty: 30 tablet, Refills: 0      losartan (COZAAR) 25 MG tablet Take 1 tablet (25 mg total) by mouth once daily.  Qty: 30 tablet, Refills: 0      metFORMIN (GLUCOPHAGE) 500 MG tablet Take 1 tablet (500 mg total) by mouth 2 (two) times daily.  Qty: 60 tablet, Refills: 0      oxybutynin (DITROPAN-XL) 5 MG TR24 Take 1 tablet (5 mg total) by mouth every other day.  Qty: 15 tablet, Refills: 0    Comments: Med to bed 470           CONTINUE these medications which have NOT CHANGED    Details   acetaminophen (TYLENOL) 650 MG TbSR Take 1,300 mg by mouth 2 (two) times daily as needed.      albuterol-ipratropium (DUO-NEB) 2.5 mg-0.5 mg/3 mL nebulizer solution Take 3 mLs by nebulization every 4 (four) hours as needed for Wheezing or Shortness of Breath.      apixaban (ELIQUIS) 5 mg Tab TAKE 1 TABLET BY MOUTH TWICE A DAY  Qty: 180 tablet, Refills: 3    Associated Diagnoses: Persistent atrial fibrillation      ascorbic acid, vitamin C, (VITAMIN C) 500 MG tablet Take 1,000 mg by mouth once daily.       calcium carbonate/vitamin D3 (CALTRATE 600 + D ORAL) Take 1 tablet by mouth once daily.      carvediloL (COREG) 12.5 MG tablet Take 1 tablet (12.5 mg total) by mouth 2 (two) times daily.  Qty: 180 tablet, Refills: 3      clopidogreL (PLAVIX) 75 mg tablet TAKE 1 TABLET BY MOUTH EVERY DAY  Qty: 90 tablet, Refills: 3      diltiaZEM (CARDIZEM CD) 240 MG 24 hr capsule TAKE 1 CAPSULE BY  MOUTH ONCE A DAY  Qty: 90 capsule, Refills: 3      fexofenadine (ALLEGRA) 60 MG tablet Take 60 mg by mouth once daily.      fish oil-omega-3 fatty acids 300-1,000 mg capsule Take 1 g by mouth once daily.       gabapentin (NEURONTIN) 600 MG tablet Take 600 mg by mouth 3 (three) times daily.      LORazepam (ATIVAN) 0.5 MG tablet Take 1 tablet (0.5 mg total) by mouth every morning.  Qty: 30 tablet, Refills: 2      montelukast (SINGULAIR) 10 mg tablet Take 10 mg by mouth once daily.      pantoprazole (PROTONIX) 40 MG tablet Take 1 tablet (40 mg total) by mouth once daily.  Qty: 90 tablet, Refills: 3      phenazopyridine HCl (PYRIDIUM ORAL) Take 1 tablet by mouth 3 (three) times daily as needed.      POLYSORBATE 80/GLYCERIN (REFRESH DRY EYE THERAPY OPHT) Apply to eye 2 (two) times daily.      potassium gluconate 550 mg (90 mg) Tab Take 180 mg by mouth 2 (two) times a day.      ranolazine (RANEXA) 1,000 mg Tb12 Take 1 tablet (1,000 mg total) by mouth 2 (two) times daily.  Qty: 180 tablet, Refills: 3      SYMBICORT 160-4.5 mcg/actuation HFAA Inhale 1 puff into the lungs 2 (two) times daily.      vitamin D (VITAMIN D3) 1000 units Tab Take 2,000 Units by mouth once daily.      ZINC ORAL Take 1 tablet by mouth once daily.      mv-mn/folic ac/calcium/vit K1 (WOMEN'S 50 PLUS MULTIVITAMIN ORAL) Take 1 tablet by mouth once daily.      nitroGLYCERIN (NITROSTAT) 0.4 MG SL tablet Place 1 tablet (0.4 mg total) under the tongue every 5 (five) minutes as needed for Chest pain.  Qty: 25 tablet, Refills: PRN    Comments: Med to bed 470           STOP taking these medications       nitrofurantoin, macrocrystal-monohydrate, (MACROBID) 100 MG capsule Comments:   Reason for Stopping:                 I have seen and examined this patient within the last 30 days. My clinical findings that support the need for the home health skilled services and home bound status are the following:no   Weakness/numbness causing balance and gait disturbance due  to Heart Failure and Weakness/Debility making it taxing to leave home.  Medical restrictions requiring assistance of another human to leave home due to  Dyspnea on exertion (SOB), Fluid/volume overload, and Home oxygen requirement.     Diet:   cardiac diet and diabetic diet 2000 calorie    Labs:  BMP in 1 week    Referrals/ Consults  Physical Therapy to evaluate and treat. Evaluate for home safety and equipment needs; Establish/upgrade home exercise program. Perform / instruct on therapeutic exercises, gait training, transfer training, and Range of Motion.  Occupational Therapy to evaluate and treat. Evaluate home environment for safety and equipment needs. Perform/Instruct on transfers, ADL training, ROM, and therapeutic exercises.    Activities:   activity as tolerated    Nursing:   Agency to admit patient within 24 hours of hospital discharge unless specified on physician order or at patient request    SN to complete comprehensive assessment including routine vital signs. Instruct on disease process and s/s of complications to report to MD. Review/verify medication list sent home with the patient at time of discharge  and instruct patient/caregiver as needed. Frequency may be adjusted depending on start of care date.     Skilled nurse to perform up to 3 visits PRN for symptoms related to diagnosis    Notify MD if SBP > 160 or < 90; DBP > 90 or < 50; HR > 120 or < 50; Temp > 101; O2 < 88%; Other:       Ok to schedule additional visits based on staff availability and patient request on consecutive days within the home health episode.    When multiple disciplines ordered:    Start of Care occurs on Sunday - Wednesday schedule remaining discipline evaluations as ordered on separate consecutive days following the start of care.    Thursday SOC -schedule subsequent evaluations Friday and Monday the following week.     Friday - Saturday SOC - schedule subsequent discipline evaluations on consecutive days starting Monday  of the following week.    For all post-discharge communication and subsequent orders please contact patient's primary care physician. I    Miscellaneous   Diabetic Care:   SN to perform and educate Diabetic management with blood glucose monitoring:, Fingerstick blood sugar a.m. and p.m., and Report CBG < 60 or > 350 to physician.  Heart Failure:      SN to instruct on the following:    Instruct on the definition of CHF.   Instruct on the signs/sympoms of CHF to be reported.   Instruct on and monitor daily weights.   Instruct on factors that cause exacerbation.   Instruct on action, dose, schedule, and side effects of medications.   Instruct on diet as prescribed.   Instruct on activity allowed.   Instruct on life-style modifications for life long management of CHF   SN to assess compliance with daily weights, diet, medications, fluid retention,    safety precautions, activities permitted and life-style modifications.   Additional 1-2 SN visits per week as needed for signs and symptoms     of CHF exacerbation.      For Weight Gain > 2-3 lbs in 1 day or 4-6 lbs over 1 week notify PCP:  CHF DIURETIC SLIDING SCALE     Skilled nurse visits daily to instruct and monitor medication adherence until target weight. Then resume prior order frequency.     If weight gain exceeds 5 lbs over target weight, call MD  If weight gain of 3-4 lbs over target weight, then:     Increase Furosemide - current dose (mg/day) to 80mg double dose and frequency of twice daily until target weight achieved or maximum 3 days.     If already on max oral daily dose, see IV diuretic instructions.    After 3 days of increased oral diuretic dose, get BMP. If patient is on increased oral diuretic dose greater than 5 days, repeat BMP at day 7 of increased dose.     Potassium supplementation   Scr > 1.5 mg/dl  Scr  1.5 mg/dl    K < 3.0 - NOTIFY MD and 40 mEq bid  40 mEq tid   K- 3.1-3.3  20 mEq bid  20 mEq tid    K 3.4-3.7  10 mEq bid  10 mEq tid      If  target weight not reached after 5 days of increased oral diuretic, proceed to IV diuretic with daily patient contact to include face-to-face visit and telephone encounters.       Home Oxygen:  Oxygen at 2 L/min nasal canula to be used:  Continuously., Assess oxygen saturation via pulse oximeter as needed for increase in SOB., and Notify physician if oxygen saturation less than 88%    Home Health Aide:  Physical Therapy Three times weekly and Occupational Therapy Three times weekly    Wound Care Orders  no    I certify that this patient is confined to her home and needs physical therapy and occupational therapy.

## 2023-09-26 NOTE — PT/OT/SLP PROGRESS
Physical Therapy Treatment    Patient Name:  Adriana Strong   MRN:  [id number]    Recommendations:     Discharge Recommendations: other (see comments) (defer to CM/SW)  Discharge Equipment Recommendations: none  Barriers to discharge: None    Assessment:     Adriana Strong is a 78 y.o. female admitted with a medical diagnosis of Acute on chronic heart failure with preserved ejection fraction.  She presents with the following impairments/functional limitations: weakness, impaired endurance, impaired self care skills, gait instability, decreased safety awareness, impaired cardiopulmonary response to activity .    Rehab Prognosis: Good; patient would benefit from acute skilled PT services to address these deficits and reach maximum level of function.    Recent Surgery: * No surgery found *      Plan:     During this hospitalization, patient to be seen 4 x/week to address the identified rehab impairments via gait training, therapeutic activities, therapeutic exercises, neuromuscular re-education and progress toward the following goals:    Plan of Care Expires:  10/23/23    Subjective     Chief Complaint: Pt agreeable to PT  Patient/Family Comments/goals: none verbalized  Pain/Comfort:  Pain Rating 1: 0/10      Objective:     Communicated with RN prior to session.  Patient found HOB elevated with oxygen, peripheral IV, telemetry upon PT entry to room.     General Precautions: Standard, fall  Orthopedic Precautions: N/A  Braces: N/A  Respiratory Status: nasal cannula     Functional Mobility:  Bed Mobility:     Supine to Sit: minimum assistance  Transfers:     Sit to Stand:  minimum assistance with rolling walker  Gait: x3 feet, x10 feet with rest between RW CGA; decreased dany, decreased step length, decreased foot clearance, decreased heel strike, wide SUMMER, increased WB on AD, SOB      AM-PAC 6 CLICK MOBILITY  Turning over in bed (including adjusting bedclothes, sheets and blankets)?: 3  Sitting down on and standing  up from a chair with arms (e.g., wheelchair, bedside commode, etc.): 3  Moving from lying on back to sitting on the side of the bed?: 3  Moving to and from a bed to a chair (including a wheelchair)?: 3  Need to walk in hospital room?: 3  Climbing 3-5 steps with a railing?: 2  Basic Mobility Total Score: 17       Treatment & Education:  Pt thoroughly educated on WB on AD and safety, foot clearance and fall risk, SOB and therapeutic rest breaks. Education also provided on home mobility safety, maintaining independence, and calling for assistance.     Patient left up in chair with all lines intact, call button in reach, and significant other present..    GOALS:   Multidisciplinary Problems       Physical Therapy Goals       Not on file              Multidisciplinary Problems (Resolved)          Problem: Physical Therapy    Goal Priority Disciplines Outcome Goal Variances Interventions   Physical Therapy Goal   (Resolved)     PT, PT/OT Met     Description: Goals to be met by: 10/10/2023    Patient will increase functional independence with mobility by performin. Supine to sit with Supervision  2. Sit to stand transfer with Supervision using RW  3. Gait  x 50 feet with Supervision using RW.                          Time Tracking:     PT Received On: 23  PT Start Time: 1359     PT Stop Time: 1427  PT Total Time (min): 28 min     Billable Minutes: Gait Training 28    Treatment Type: Treatment  PT/PTA: PTA     Number of PTA visits since last PT visit: 2023

## 2023-09-26 NOTE — PLAN OF CARE
Pt accepted by [facility] [phone] x1.  SW notified PHN that [facility] accepted patient for HH.  Pt also received home O2 at bedside.  Concentrator will be delivered to the home.  Pt's  will provide transportation home.     SHARYN notified pt and  of accepting HH facility ([facility]).     09/26/23 1246   Post-Acute Status   Post-Acute Authorization Home Health;HME   HME Status Set-up Complete/Auth obtained  (O2 delivered to bedside.)   Home Health Status Set-up Complete/Auth obtained  ([facility])   Coverage [employer] Managed Medicare   Discharge Delays None known at this time   Discharge Plan   Discharge Plan A Home;Home with family;Home Health   Discharge Plan B Home;Home with family     Ciara Peter LMSW  PRN-  [facility]  Ext. [phone]

## 2023-09-26 NOTE — ASSESSMENT & PLAN NOTE
Patient has hyponatremia which is uncontrolled,We will aim to correct the sodium by 4-6mEq in 24 hours. We will monitor sodium Daily. The hyponatremia is due to Heart Failure. We will obtain the following studies: Urine sodium, urine osmolality, serum osmolality. We will treat the hyponatremia with Fluid restriction of:  1.5 liter per day and lasix. The patient's sodium results have been reviewed and are listed below.  Recent Labs   Lab 09/26/23  0601   *     Improving with diuresis

## 2023-09-26 NOTE — PLAN OF CARE
[facility]  Discharge Final Note    Primary Care Provider: Krzysztof Mcgraw MD    Expected Discharge Date: 9/26/2023    Pt discharged home with [facility] for  services.  Notification sent to N of accepting HH facility.  Pt received O2 at bedside.  Pt's family provided transportation home.      Future Appointments   Date Time Provider Department Center   10/27/2023 12:30 PM CARDIAC, PET IMAGING [facility] CARDPET [facility]   10/27/2023  1:45 PM ECHO, [facility] ECHOSTR [facility]   12/15/2023  9:30 AM Nate Landis MD [facility] CARDIO [facility]         Final Discharge Note (most recent)       Final Note - 09/26/23 1701          Final Note    Assessment Type Final Discharge Note     Anticipated Discharge Disposition Home-Health Care Svc   [facility]       Post-Acute Status    Post-Acute Authorization Home Health;HME     HME Status Set-up Complete/Auth obtained     Home Health Status Set-up Complete/Auth obtained     Coverage [employer]     Discharge Delays None known at this time                     Important Message from Medicare  Important Message from Medicare regarding Discharge Appeal Rights: Given to patient/caregiver, Explained to patient/caregiver, Signed/date by patient/caregiver     Date IMM was signed: 09/26/23  Time IMM was signed: 1046    Contact Info       [facility]    [address], [address] [place] [address]  [phone]       Next Steps: Follow up    Instructions: [facility] will call to schedule follow-up    Krzysztof Mcgraw MD   Specialty: Family Medicine   Relationship: PCP - General    [address] [facility]  [address]  [place] [address]   Phone: [phone]       Next Steps: Follow up          Ciara Peter LMSW  PRN-  [facility]  Ext. [phone]

## 2023-09-26 NOTE — DISCHARGE SUMMARY
[facility] Medicine  Discharge Summary      Patient Name: Adriana Strong  MRN: [id number]  LENNOX: [id number]  Patient Class: IP- Inpatient  Admission Date: 9/21/2023  Hospital Length of Stay: 5 days  Discharge Date and Time:  09/26/2023 12:45 PM  Attending Physician: Jonny Perez MD   Discharging Provider: Jonny Perez MD  Primary Care Provider: Krzysztof Mcgraw MD  [facility] Medicine Team: [facility] Jonny Perez MD  Primary Care Team: [facility]    HPI:   78F with PMH of COPD, HFpEF, Afib with PPM on eliquis, HTN, DM2, mesenteric ischemia who presents with c/o SOB since last night. SOB occurs while at rest and worse with exertion. She also noticed chest heaviness/tightness this AM. No known alleviating factors. Denies palpitations, diaphoresis, fever, chills, cough but has noticed mild wheezing. Not sure if there's been weight gain or swelling. No orthopnea, PND. No sick contacts. She is compliant with her meds, including lasix, but does admit to poor adherence to cardiac diet. She was started on macrobid on monday for UTI. Workup in ED remarkable for tachypnea/hypoxia requiring 2L NC, Hb 10.9, Na 128, Cl 89, , Trop neg, CXR with no acute findings. She received dose of iv lasix 40mg in ED. Will be admitted to hospital medicine service for further management.      * No surgery found *      Hospital Course:   See individual problem list.       Goals of Care Treatment Preferences:  Code Status: Full Code    Living Will: Yes              Consults:   Consults (From admission, onward)        Status Ordering Provider     Inpatient consult to Social Work/Case Management  Once        Provider:  (Not yet assigned)    Completed JONNY PEREZ     Inpatient consult to Registered Dietitian/Nutritionist  Once        Provider:  (Not yet assigned)    Completed JONNY PEREZ          Pulmonary  Acute hypoxemic respiratory failure  Patient with Hypoxic Respiratory failure which is  Acute.  she is not on home oxygen. Supplemental oxygen was provided and noted- Oxygen Concentration (%):  [28] 28    .   Signs/symptoms of respiratory failure include- increased work of breathing. Contributing diagnoses includes - CHF and COPD Labs and images were reviewed. Patient Has not had a recent ABG. Will treat underlying causes and adjust management of respiratory failure as follows-     Has remained on 2L NC despite diuresis  Repeat CXR showing atelectasis, IS ordered  Has qualified for home O2 2L NC    COPD (chronic obstructive pulmonary disease)  No signs of acute exacerbation  Cont home inhaler  Supp O2 PRN to keep O2 sat 88-92%  Duoneb PRN  6MWT performed, has qualified for home O2 2L NC      Cardiac/Vascular  * Acute on chronic heart failure with preserved ejection fraction  Patient is identified as having Diastolic (HFpEF) heart failure that is Acute on chronic. CHF is currently improving. Latest ECHO performed and demonstrates- Results for orders placed during the hospital encounter of 09/30/22    Echo    Interpretation Summary  · The left ventricle is normal in size with concentric hypertrophy and normal systolic function.  · The estimated ejection fraction is 55%.  · Indeterminate left ventricular diastolic function.  · Normal right ventricular size with normal right ventricular systolic function.  · Mild left atrial enlargement.  · There is abnormal septal wall motion consistent with right ventricular pacemaker.  · Mild to moderate tricuspid regurgitation.  · There is a 26 mm Edward S3 transcutaneously-placed aortic bioprosthesis present. There is no aortic insufficiency present. Prosthetic aortic valve is normal.  · The aortic valve mean gradient is 7 mmHg with a dimensionless index of 0.51.  · Intermediate central venous pressure (8 mmHg).  · The estimated PA systolic pressure is 47 mmHg.  · There is pulmonary hypertension.  . Continue Beta Blocker, ACE/ARB and Furosemide and monitor clinical  status closely. Monitor on telemetry. Patient is on CHF pathway.  Monitor strict Is&Os and daily weights.  Place on fluid restriction of 1.5 L. Cardiology has not been any consulted. Continue to stress to patient importance of self efficacy and  on diet for CHF. Last BNP reviewed- and noted below   Recent Labs   Lab 09/21/23  1250   *   .  Aortic stenosis s/p Porcine TAVR (on plavix)  - cont home meds coreg, diltiazem, imdur, ranolazine  - given 40mg IV lasix, lasix 40mg iv bid, strict I&Os, daily weights, plan to taper the dose tomorrow   - hypoxic in ED, placed on 2L NC with improvement of SOB/tachypnea  - , negative troponin  - TTE 9/21 showing EF of 55 - 60% with Grade III diastolic dysfunction.  - cont iv lasix, transition to 40mg PO BID on discharge  - has qualified for home O2  - follow up with cardio outpatient    Chest discomfort  Intermittent substernal chest pain possibly 2/2 chronic angina  On imdur and ranolazine  No acute findings on tele, ekg, tte        Aortic stenosis  S/p TAVR      Persistent atrial fibrillation  Patient with Persistent (7 days or more) atrial fibrillation which is controlled currently with Beta Blocker. Patient is currently in atrial fibrillation.[id number] Score: 5. Anticoagulation indicated. Anticoagulation done with eliquis.  PPM in place    Essential hypertension  Cont coreg and losartan      HLD (hyperlipidemia)  Cont statin      Renal/  Acute cystitis with hematuria  Has complaint of dysuria, although UA appears noninfectious   Started on macrobid 9/18, discontinued      Hyponatremia  Patient has hyponatremia which is uncontrolled,We will aim to correct the sodium by 4-6mEq in 24 hours. We will monitor sodium Daily. The hyponatremia is due to Heart Failure. We will obtain the following studies: Urine sodium, urine osmolality, serum osmolality. We will treat the hyponatremia with Fluid restriction of:  1.5 liter per day and lasix. The patient's sodium  results have been reviewed and are listed below.  Recent Labs   Lab 09/26/23  0601   *     Improving with diuresis    Endocrine  Type 2 diabetes mellitus  Patient's FSGs are controlled on current medication regimen.  Last A1c reviewed-   Lab Results   Component Value Date    HGBA1C 5.3 09/21/2023     Most recent fingerstick glucose reviewed-   Recent Labs   Lab 09/25/23  1626 09/25/23  1959 09/26/23  0758 09/26/23  1044   POCTGLUCOSE 128* 145* 129* 147*     Current correctional scale  Low  Maintain anti-hyperglycemic dose as follows-   Antihyperglycemics (From admission, onward)    Start     Stop Route Frequency Ordered    09/21/23 1705  insulin aspart U-100 pen 0-5 Units         -- SubQ Before meals & nightly PRN 09/21/23 1606        Hold Oral hypoglycemics while patient is in the hospital.    GI  Chronic mesenteric ischemia  No acute issues  Monitor      GERD (gastroesophageal reflux disease)  Cont PPI        Final Active Diagnoses:    Diagnosis Date Noted POA    PRINCIPAL PROBLEM:  Acute on chronic heart failure with preserved ejection fraction [I50.33] 09/30/2022 Yes    Acute hypoxemic respiratory failure [J96.01] 09/26/2023 Yes    Chest discomfort [R07.89] 09/22/2023 Yes    Acute cystitis with hematuria [N30.01]  Yes    Hyponatremia [E87.1] 09/01/2020 Yes    COPD (chronic obstructive pulmonary disease) [J44.9] 06/13/2017 Yes    Aortic stenosis [I35.0] 02/03/2017 Yes    Persistent atrial fibrillation [I48.19] 07/08/2016 Yes    Essential hypertension [I10] 04/08/2016 Yes    Chronic mesenteric ischemia [K55.1] 04/16/2015 Yes    Type 2 diabetes mellitus [E11.9] 11/24/2014 Yes    HLD (hyperlipidemia) [E78.5] 11/24/2014 Yes    GERD (gastroesophageal reflux disease) [K21.9] 11/24/2014 Yes      Problems Resolved During this Admission:       Discharged Condition: good    Disposition: Home or Self Care    Follow Up:    Patient Instructions:      OXYGEN FOR HOME USE     Order Specific Question Answer  "Comments   Liter Flow 2    Duration Continuous    Qualifying Test Performed at: Rest    Oxygen saturation: 85    Portable mode: pulse dose acceptable    Mode: Portable concentrator    Route nasal cannula    Device: home concentrator with portable concentrator    Length of need (in months): 99 mos    Patient condition with qualifying saturation COPD COPD, CHF   Height: 5' 4" (1.626 m)    Weight: 115 kg (253 lb 8.5 oz)    Alternative treatment measures have been tried or considered and deemed clinically ineffective. Yes      Ambulatory referral/consult to Cardiology   Standing Status: Future   Referral Priority: Routine Referral Type: Consultation   Referral Reason: Specialty Services Required   Requested Specialty: Cardiology   Number of Visits Requested: 1     ACCEPT - Ambulatory referral/consult to [facility]   Standing Status: Future   Referral Priority: Routine Referral Type: Consultation   Referral Reason: Specialty Services Required   Requested Specialty: Cardiology   Number of Visits Requested: 1     Diet diabetic     Diet Cardiac     Notify your health care provider if you experience any of the following:  severe uncontrolled pain     Notify your health care provider if you experience any of the following:  difficulty breathing or increased cough     Notify your health care provider if you experience any of the following:  persistent dizziness, light-headedness, or visual disturbances     Notify your health care provider if you experience any of the following:  increased confusion or weakness     Activity as tolerated       Significant Diagnostic Studies: Labs: All labs within the past 24 hours have been reviewed    Pending Diagnostic Studies:     None         Medications:  Reconciled Home Medications:      Medication List      CONTINUE taking these medications    acetaminophen 650 MG Tbsr  Commonly known as: TYLENOL  Take 1,300 mg by mouth 2 (two) times daily as needed.   "   albuterol-ipratropium 2.5 mg-0.5 mg/3 mL nebulizer solution  Commonly known as: DUO-NEB  Take 3 mLs by nebulization every 4 (four) hours as needed for Wheezing or Shortness of Breath.     ascorbic acid (vitamin C) 500 MG tablet  Commonly known as: VITAMIN C  Take 1,000 mg by mouth once daily.     atorvastatin 10 MG tablet  Commonly known as: LIPITOR  Take 1 tablet (10 mg total) by mouth once daily.     CALTRATE 600 + D ORAL  Take 1 tablet by mouth once daily.     carvediloL 12.5 MG tablet  Commonly known as: COREG  Take 1 tablet (12.5 mg total) by mouth 2 (two) times daily.     clopidogreL 75 mg tablet  Commonly known as: PLAVIX  TAKE 1 TABLET BY MOUTH EVERY DAY     diltiaZEM 240 MG 24 hr capsule  Commonly known as: CARDIZEM CD  TAKE 1 CAPSULE BY MOUTH ONCE A DAY     DULoxetine 20 MG capsule  Commonly known as: CYMBALTA  Take 1 capsule (20 mg total) by mouth once daily.     ELIQUIS 5 mg Tab  Generic drug: apixaban  TAKE 1 TABLET BY MOUTH TWICE A DAY     fexofenadine 60 MG tablet  Commonly known as: ALLEGRA  Take 60 mg by mouth once daily.     fish oil-omega-3 fatty acids 300-1,000 mg capsule  Take 1 g by mouth once daily.     furosemide 40 MG tablet  Commonly known as: LASIX  Take 1 tablet (40 mg total) by mouth 2 (two) times a day.     gabapentin 600 MG tablet  Commonly known as: NEURONTIN  Take 600 mg by mouth 3 (three) times daily.     isosorbide mononitrate 60 MG 24 hr tablet  Commonly known as: IMDUR  Take 1 tablet (60 mg total) by mouth once daily.     LORazepam 0.5 MG tablet  Commonly known as: ATIVAN  Take 1 tablet (0.5 mg total) by mouth every morning.     losartan 25 MG tablet  Commonly known as: COZAAR  Take 1 tablet (25 mg total) by mouth once daily.     metFORMIN 500 MG tablet  Commonly known as: GLUCOPHAGE  Take 1 tablet (500 mg total) by mouth 2 (two) times daily.     montelukast 10 mg tablet  Commonly known as: SINGULAIR  Take 10 mg by mouth once daily.     nitroGLYCERIN 0.4 MG SL tablet  Commonly  known as: NITROSTAT  Place 1 tablet (0.4 mg total) under the tongue every 5 (five) minutes as needed for Chest pain.     oxybutynin 5 MG Tr24  Commonly known as: DITROPAN-XL  Take 1 tablet (5 mg total) by mouth every other day.     pantoprazole 40 MG tablet  Commonly known as: PROTONIX  Take 1 tablet (40 mg total) by mouth once daily.     potassium gluconate 550 mg (90 mg) Tab  Take 180 mg by mouth 2 (two) times a day.     PYRIDIUM ORAL  Take 1 tablet by mouth 3 (three) times daily as needed.     ranolazine 1,000 mg Tb12  Commonly known as: RANEXA  Take 1 tablet (1,000 mg total) by mouth 2 (two) times daily.     REFRESH DRY EYE THERAPY OPHT  Apply to eye 2 (two) times daily.     SYMBICORT 160-4.5 mcg/actuation Hfaa  Generic drug: budesonide-formoterol 160-4.5 mcg  Inhale 1 puff into the lungs 2 (two) times daily.     vitamin D 1000 units Tab  Commonly known as: VITAMIN D3  Take 2,000 Units by mouth once daily.     WOMEN'S 50 PLUS MULTIVITAMIN ORAL  Take 1 tablet by mouth once daily.     ZINC ORAL  Take 1 tablet by mouth once daily.        STOP taking these medications    nitrofurantoin (macrocrystal-monohydrate) 100 MG capsule  Commonly known as: MACROBID            Indwelling Lines/Drains at time of discharge:   Lines/Drains/Airways     None                 Time spent on the discharge of patient: 35 minutes         Larry Agee MD  Department of Hospital Medicine  [facility]

## 2023-09-26 NOTE — PLAN OF CARE
APPOINTMENT:    CHW instructed by PCP facility will require patient to call in to schedule their own appointment.

## 2023-09-26 NOTE — ASSESSMENT & PLAN NOTE
Patient with Hypoxic Respiratory failure which is Acute.  she is not on home oxygen. Supplemental oxygen was provided and noted- Oxygen Concentration (%):  [28] 28    .   Signs/symptoms of respiratory failure include- increased work of breathing. Contributing diagnoses includes - CHF and COPD Labs and images were reviewed. Patient Has not had a recent ABG. Will treat underlying causes and adjust management of respiratory failure as follows-     Has remained on 2L NC despite diuresis  Repeat CXR showing atelectasis, IS ordered  Has qualified for home O2 2L NC

## 2023-09-26 NOTE — ASSESSMENT & PLAN NOTE
Patient with Persistent (7 days or more) atrial fibrillation which is controlled currently with Beta Blocker. Patient is currently in atrial fibrillation.[id number] Score: 5. Anticoagulation indicated. Anticoagulation done with eliquis.  PPM in place

## 2023-09-26 NOTE — ASSESSMENT & PLAN NOTE
No signs of acute exacerbation  Cont home inhaler  Supp O2 PRN to keep O2 sat 88-92%  Duoneb PRN  6MWT performed, has qualified for home O2 2L NC

## 2023-09-26 NOTE — ASSESSMENT & PLAN NOTE
Patient is identified as having Diastolic (HFpEF) heart failure that is Acute on chronic. CHF is currently improving. Latest ECHO performed and demonstrates- Results for orders placed during the hospital encounter of 09/30/22    Echo    Interpretation Summary  · The left ventricle is normal in size with concentric hypertrophy and normal systolic function.  · The estimated ejection fraction is 55%.  · Indeterminate left ventricular diastolic function.  · Normal right ventricular size with normal right ventricular systolic function.  · Mild left atrial enlargement.  · There is abnormal septal wall motion consistent with right ventricular pacemaker.  · Mild to moderate tricuspid regurgitation.  · There is a 26 mm Edward S3 transcutaneously-placed aortic bioprosthesis present. There is no aortic insufficiency present. Prosthetic aortic valve is normal.  · The aortic valve mean gradient is 7 mmHg with a dimensionless index of 0.51.  · Intermediate central venous pressure (8 mmHg).  · The estimated PA systolic pressure is 47 mmHg.  · There is pulmonary hypertension.  . Continue Beta Blocker, ACE/ARB and Furosemide and monitor clinical status closely. Monitor on telemetry. Patient is on CHF pathway.  Monitor strict Is&Os and daily weights.  Place on fluid restriction of 1.5 L. Cardiology has not been any consulted. Continue to stress to patient importance of self efficacy and  on diet for CHF. Last BNP reviewed- and noted below   Recent Labs   Lab 09/21/23  1250   *   .  Aortic stenosis s/p Porcine TAVR (on plavix)  - cont home meds coreg, diltiazem, imdur, ranolazine  - given 40mg IV lasix, lasix 40mg iv bid, strict I&Os, daily weights, plan to taper the dose tomorrow   - hypoxic in ED, placed on 2L NC with improvement of SOB/tachypnea  - , negative troponin  - TTE 9/21 showing EF of 55 - 60% with Grade III diastolic dysfunction.  - cont iv lasix, transition to 40mg PO BID on discharge  - has  qualified for home O2  - follow up with cardio outpatient

## 2023-09-27 ENCOUNTER — TELEPHONE (OUTPATIENT)
Dept: CARDIOLOGY | Facility: CLINIC | Age: 78
End: 2023-09-27
Payer: MEDICARE

## 2023-09-27 NOTE — TELEPHONE ENCOUNTER
"[facility]) hospital discharge 48-72 hour phone follow up completed.     Most Recent Hospital Discharge Date: 9-26-23  Last admission Diagnosis/chief complaint: SOB at rest    TCC RN Navigator spoke with PT/ and 2 Daughters on Speaker phone    Current Patient reported weight: Not weighing yet, "Just got home last night"    Current Patient reported blood pressure and heart rate:  Not checking "Just got home last night"    Pt reports the following:  []  Shortness of Breath with Activity  []  Shortness of Breath at rest   []  Fatigue  []  Edema   [] Chest pain or tightness  [] Weight Increase since discharge  [x] None of the above  PT has been coughing since she left the Hospital  Medications:   Discharge medication reviewed with pt.  Pt reports having medication list available and has all medications at home for use per list.     Education:   Confirmed pt received "Home Care Guide for Heart Failure Patients" while admitted.   Reviewed key points as listed below.     Recommend 2 -3 gram sodium restriction and 1500 cc-2000 cc fluid restriction.  Encourage physical activity with graded exercise program.  Requested patient to weigh themselves daily, and to notify us if their weight increases by more than 3 lbs in 1 day or 5 lbs in 3 days.   Reminded patient to use "Daily weight and symptom tracker" to record and guide patient on when and how to call H[facility]. PT may also use symptom tracker if no scale available  Pt reports being in the (GREENcolor) Zone. If in yellow/red, reminded that they should be calling HFT[facility] today or now.     Watch for these Signs and Symptoms: If any of these occur, contact H[facility] immediately:   Increase in shortness of breath with movement   Increase in swelling in your legs and ankles   Weight gain of more than 3 pounds in a day or 5 pounds in 3 days.   Difficulty breathing when you are lying down   Worsening fatigue or tiredness   Stomach bloating, a full " "feeling or a loss of appetite   Increased coughing--especially when you are lying down      Pt was able to verbalize back to RN in their own words correct diet/fluid restrictions, necessity for exercise, warning signs and symptoms, when and how to contact their TCC team.      Pt educated on follow-up plan while in H[facility] program to include:   Week 1 -  F/u appt with Provider and RN (Date) Appt Tuesaday 10-3-23   Week 2-5 - In person/ Virtual/ phone call check ins    Week 5-7 - Pt will discharge from [facility] and transition to longterm care provider (Cardiology/PCP/ Advanced Heart Failure).      Patient active on myChart? Yes      Pt given the following contact information for ease of communication: [phone] (Mon-Fri, 8a-5p) & for urgent issues on the weekend to page the Heart Transplant MD on call.  Pt also encouraged utilize [facility] messaging as well.      Will follow up with pt at first clinic visit and RN navigator available for pt questions, issues or concerns.     Long discussion as Family unsure of the need for this appt.  doesn't want wife to come but his daughters explain that she needs to come. Family confused because they have gotten multiple calls for appts and their Mother just got home last night. I explain that is because we are trying to keep her from "bouncing back into the Hospital". Family agrees to come to the appt with Ms. Andujar. Explained that PA and RN visit is at the same place and time, la work is 30 min before appt on the 2nd floor.        PT and family verbalize read back of information given.  Encouraged pt and family to read over information often and contact team with any questions or concerns. Instructed PT to get labs on 2nd floor before our appt. And PA and RN appt are done at the same place and time.       "

## 2023-09-28 NOTE — PHYSICIAN QUERY
PT Name: Adriana Strong  MR #: [id number]    DOCUMENTATION CLARIFICATION      CDS/: AMY Fishman, RN, CCDS              Contact information: [email or web address]    This form is a permanent document in the medical record.      Query Date: September 28, 2023    By submitting this query, we are merely seeking further clarification of documentation. Please utilize your independent clinical judgment when addressing the question(s) below.    The Medical Record contains the following:  Indicators Supporting Clinical Findings Location in Medical Record   X BMI Body mass index is 43.77 kg/m².   H & P: Dr. Aege 8/21   X Height/Weight Weight: 115.7 kg (255 lb)   H & P: Dr. Agee 8/21   X Acute/Chronic Illness PMH of COPD, HFpEF, Afib with PPM on eliquis, HTN, DM2, mesenteric ischemia who presents with c/o SOB since last night. SOB occurs while at rest and worse with exertion H & P: Dr. Agee 8/21    Treatment      Other         Provider, please specify the diagnosis associated with the above clinical findings.   x[   ] Morbid Obesity   [   ] Other diagnosis (please specify): _________       Please document in your progress notes daily for the duration of treatment until resolved and include in your discharge summary.  Form [id number]

## 2023-09-29 ENCOUNTER — PATIENT OUTREACH (OUTPATIENT)
Dept: ADMINISTRATIVE | Facility: CLINIC | Age: 78
End: 2023-09-29
Payer: MEDICARE

## 2023-09-29 NOTE — PROGRESS NOTES
C3 nurse attempted to contact Adriana Strong for a TCC post hospital discharge follow up call. The patient is unable to conduct the call @ this time. The patient requested a callback.    The patient does not have a scheduled HOSFU appointment within 5-7 days post hospital discharge date 09/26/23. Message sent to Physician staff to assist with HOSFU appointment scheduling.

## 2023-09-29 NOTE — PROGRESS NOTES
C3 nurse spoke with Adriana Strong for a TCC post hospital discharge follow up call. Nurse offered to scheduled TCC hospital follow-up appointment. The patient declined appointment at this time.      Pt preference to schedule personally.

## 2023-09-29 NOTE — PROGRESS NOTES
HF TCC Provider Note (Initial Clinic) Consult Note    Date of original referral: 9/22/23  Age: 78 y.o.  Gender: female  Ethnicity: White  Number of admissions for CHF within the preceding year: 2  Duration of CHF: 1.5 years  Type of Congestive Heart Failure: Diastolic   Etiology: Non-ischemic    Social history: denies smoking history  Enrolled in Infusion suite: no    Diagnostic Labs:   EKG - 09/21/2023  CXR - 09/25/2023  ECHO - 09/22/2023  Stress test -   Stress echo - 09/14/2022  Pharmacologic stress -   Cardiac catheterization - 11/19/2019   Cardiac MRI -     Lab Results   Component Value Date     (L) 09/26/2023     (L) 09/25/2023    K 3.9 09/26/2023    K 3.7 09/25/2023    CL 90 (L) 09/26/2023    CL 92 (L) 09/25/2023    CO2 37 (H) 09/26/2023    CO2 34 (H) 09/25/2023     (H) 09/26/2023     (H) 09/25/2023    BUN 20 09/26/2023    BUN 20 09/25/2023    CREATININE 0.6 09/26/2023    CREATININE 0.7 09/25/2023    CALCIUM 8.8 09/26/2023    CALCIUM 8.9 09/25/2023    PROT 6.8 09/21/2023    PROT 6.7 05/23/2023    ALBUMIN 3.2 (L) 09/21/2023    ALBUMIN 3.6 05/23/2023    BILITOT 0.5 09/21/2023    BILITOT 0.3 05/23/2023    ALKPHOS 38 (L) 09/21/2023    ALKPHOS 33 (L) 05/23/2023    AST 46 (H) 09/21/2023    AST 17 05/23/2023    ALT 33 09/21/2023    ALT 17 05/23/2023    ANIONGAP 8 09/26/2023    ANIONGAP 9 09/25/2023    ESTGFRAFRICA >60 12/04/2021    ESTGFRAFRICA >60.0 08/17/2021    EGFRNONAA >60 12/04/2021    EGFRNONAA >60.0 08/17/2021       Lab Results   Component Value Date    WBC 6.97 09/21/2023    WBC 8.00 05/23/2023    RBC 3.60 (L) 09/21/2023    RBC 3.91 (L) 05/23/2023    HGB 10.9 (L) 09/21/2023    HGB 12.1 05/23/2023    HCT 34.2 (L) 09/21/2023    HCT 37.0 05/23/2023    MCV 95 09/21/2023    MCV 95 05/23/2023    MCH 30.3 09/21/2023    MCH 30.9 05/23/2023    MCHC 31.9 (L) 09/21/2023    MCHC 32.7 05/23/2023    RDW 14.4 09/21/2023    RDW 13.7 05/23/2023     09/21/2023     05/23/2023    MPV  10.0 09/21/2023    MPV 9.9 05/23/2023    IMMGR 0.7 (H) 09/21/2023    IMMGR 0.5 05/23/2023    IGABS 0.05 (H) 09/21/2023    IGABS 0.04 05/23/2023    LYMPH 1.0 09/21/2023    LYMPH 14.9 (L) 09/21/2023    MONO 0.9 09/21/2023    MONO 12.9 09/21/2023    EOS 0.0 09/21/2023    EOS 0.0 05/23/2023    BASO 0.03 09/21/2023    BASO 0.05 05/23/2023    NRBC 0 09/21/2023    NRBC 0 05/23/2023    GRAN 4.9 09/21/2023    GRAN 71.0 09/21/2023    EOSINOPHIL 0.1 09/21/2023    EOSINOPHIL 0.1 05/23/2023    BASOPHIL 0.4 09/21/2023    BASOPHIL 0.6 05/23/2023    PLTEST CANCELED 09/28/2016    PLTEST Increased (A) 05/11/2011    ANISO Slight 05/09/2015    ANISO sl 05/11/2011    HYPO Occasional 05/09/2015    HYPO sl 05/11/2011       Lab Results   Component Value Date     (H) 09/21/2023     (H) 09/30/2022    MG 1.6 09/21/2023    MG 1.8 10/02/2022    PHOS 2.6 (L) 10/02/2022    PHOS 3.1 10/01/2022    NTPROBNP 657 08/25/2020    TROPONINI <0.006 09/22/2023    TROPONINI <0.006 09/21/2023    HGBA1C 5.3 09/21/2023    HGBA1C 5.8 (H) 08/11/2022    TSH 0.102 (L) 09/30/2022    TSH 0.252 (L) 08/11/2022    FREET4 1.10 09/30/2022    FREET4 1.03 08/11/2022       Lab Results   Component Value Date    IRON 39 09/30/2022    TIBC 429 09/30/2022    FERRITIN 81 09/30/2022    CHOL 149 03/22/2023    TRIG 118 03/22/2023    HDL 63 03/22/2023    LDLCALC 62.4 (L) 03/22/2023    CHOLHDL 42.3 03/22/2023    TOTALCHOLEST 2.4 03/22/2023    NONHDLCHOL 86 03/22/2023    COLORU Yellow 09/21/2023    APPEARANCEUA Clear 09/21/2023    PHUR 7.0 09/21/2023    SPECGRAV 1.010 09/21/2023    PROTEINUA Negative 09/21/2023    GLUCUA Negative 09/21/2023    KETONESU Negative 09/21/2023    BILIRUBINUA Negative 09/21/2023    OCCULTUA 1+ (A) 09/21/2023    NITRITE Negative 09/21/2023    LEUKOCYTESUR Negative 09/21/2023       List all implanted cardiac devices:   Pacemaker: Single    Current Outpatient Medications on File Prior to Visit   Medication Sig Dispense Refill    acetaminophen  (TYLENOL) 650 MG TbSR Take 1,300 mg by mouth 2 (two) times daily as needed.      albuterol-ipratropium (DUO-NEB) 2.5 mg-0.5 mg/3 mL nebulizer solution Take 3 mLs by nebulization every 4 (four) hours as needed for Wheezing or Shortness of Breath.      apixaban (ELIQUIS) 5 mg Tab TAKE 1 TABLET BY MOUTH TWICE A  tablet 3    ascorbic acid, vitamin C, (VITAMIN C) 500 MG tablet Take 1,000 mg by mouth once daily.       atorvastatin (LIPITOR) 10 MG tablet Take 1 tablet (10 mg total) by mouth once daily. 30 tablet 0    calcium carbonate/vitamin D3 (CALTRATE 600 + D ORAL) Take 1 tablet by mouth once daily.      carvediloL (COREG) 12.5 MG tablet Take 1 tablet (12.5 mg total) by mouth 2 (two) times daily. 180 tablet 3    clopidogreL (PLAVIX) 75 mg tablet TAKE 1 TABLET BY MOUTH EVERY DAY 90 tablet 3    diltiaZEM (CARDIZEM CD) 240 MG 24 hr capsule TAKE 1 CAPSULE BY MOUTH ONCE A DAY 90 capsule 3    DULoxetine (CYMBALTA) 20 MG capsule Take 1 capsule (20 mg total) by mouth once daily. 30 capsule 0    fexofenadine (ALLEGRA) 60 MG tablet Take 60 mg by mouth once daily.      fish oil-omega-3 fatty acids 300-1,000 mg capsule Take 1 g by mouth once daily.       furosemide (LASIX) 40 MG tablet Take 1 tablet (40 mg total) by mouth 2 (two) times a day. 60 tablet 0    gabapentin (NEURONTIN) 600 MG tablet Take 600 mg by mouth 3 (three) times daily.      isosorbide mononitrate (IMDUR) 60 MG 24 hr tablet Take 1 tablet (60 mg total) by mouth once daily. 30 tablet 0    LORazepam (ATIVAN) 0.5 MG tablet Take 1 tablet (0.5 mg total) by mouth every morning. 30 tablet 2    losartan (COZAAR) 25 MG tablet Take 1 tablet (25 mg total) by mouth once daily. 30 tablet 0    metFORMIN (GLUCOPHAGE) 500 MG tablet Take 1 tablet (500 mg total) by mouth 2 (two) times daily. 60 tablet 0    montelukast (SINGULAIR) 10 mg tablet Take 10 mg by mouth once daily.      mv-mn/folic ac/calcium/vit K1 (WOMEN'S 50 PLUS MULTIVITAMIN ORAL) Take 1 tablet by mouth once  daily.      nitroGLYCERIN (NITROSTAT) 0.4 MG SL tablet Place 1 tablet (0.4 mg total) under the tongue every 5 (five) minutes as needed for Chest pain. 25 tablet PRN    oxybutynin (DITROPAN-XL) 5 MG TR24 Take 1 tablet (5 mg total) by mouth every other day. 15 tablet 0    pantoprazole (PROTONIX) 40 MG tablet Take 1 tablet (40 mg total) by mouth once daily. 90 tablet 3    phenazopyridine HCl (PYRIDIUM ORAL) Take 1 tablet by mouth 3 (three) times daily as needed.      POLYSORBATE 80/GLYCERIN (REFRESH DRY EYE THERAPY OPHT) Apply to eye 2 (two) times daily.      potassium gluconate 550 mg (90 mg) Tab Take 180 mg by mouth 2 (two) times a day.      ranolazine (RANEXA) 1,000 mg Tb12 Take 1 tablet (1,000 mg total) by mouth 2 (two) times daily. 180 tablet 3    SYMBICORT 160-4.5 mcg/actuation HFAA Inhale 1 puff into the lungs 2 (two) times daily.      vitamin D (VITAMIN D3) 1000 units Tab Take 2,000 Units by mouth once daily.      ZINC ORAL Take 1 tablet by mouth once daily.       Current Facility-Administered Medications on File Prior to Visit   Medication Dose Route Frequency Provider Last Rate Last Admin    0.9%  NaCl infusion   Intravenous Continuous Lynette Hightower NP   New Bag at 02/10/20 1029    sodium chloride 0.9% flush 5 mL  5 mL Intravenous PRN Lynette Hightower NP             HPI:  Patient wearing 2L supplemental O2 continuously since discharge. Denies subjective SOB. Can ambulate within home and pace slow. HH PT/OT established. Presents to clinic in wheelchair.   Patient sleeps on 2 number of pillows with wedge pillow. Unable to lay flat at baseline   Patient wakes up SOB, has to get out of bed, associated cough- denies   Palpitations - denies    Dizzy, light-headed, pre-syncope or syncope- denies   Since discharge frequency of performing weights, home weight and weight change- weight on home scale within 1lb, 242-243lbs   Other information felt pertinent to HPI: Ms. Adriana Strong is a 79 yo female  with a PMHx of COPD, HFpEF, CAD, persistent AF s/p PPM on eliquis, AS s/p TAVR, HLD, HTN, DM2, mesenteric ischemia who presents to [facility] following recent admission for ADHF. Workup in ED remarkable for tachypnea/hypoxia requiring 2L NC, Hb 10.9, Na 128, Cl 89, , Trop neg, CXR with no acute findings. She received dose of lasix 40mg in ED and then diuresed with IVP lasix 40mg BID. 6MWT qualifying for home O2, arranged prior to discharge.      PHYSICAL:   Vitals:    10/02/23 1338   BP: 131/61   Pulse: 69      Wt Readings from Last 3 Encounters:   10/02/23 110.5 kg (243 lb 9.6 oz)   09/23/23 115 kg (253 lb 8.5 oz)   09/08/23 115.2 kg (253 lb 15.5 oz)       JVD: no, difficult to assess 2/2 body habitus   Heart rhythm: regular  Cardiac murmur: No    S3: no  S4: yes  Lungs: clear  Hepatojugular reflux: yes  Edema: yes, 1+ BLE in ankles      Echo 9/22/23:    Left Ventricle: The left ventricle is normal in size. Increased wall thickness. Septal motion is consistent with bundle branch block. There is normal systolic function with a visually estimated ejection fraction of 55 - 60%. Grade III diastolic dysfunction.    Right Ventricle: Normal right ventricular cavity size. Wall thickness is normal. Right ventricle wall motion  is normal. Systolic function is normal. Pacemaker lead present in the ventricle.    Left Atrium: Left atrium is severely dilated.    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 It is reported to be a 26 mm Suzi S3 and Phan valve. Aortic valve area by VTI is 1.40 cm². Aortic valve peak velocity is 2.47 m/s. Mean gradient is 13 mmHg. The dimensionless index is 0.38. There is trace aortic regurgitation.    Pulmonary Artery: The estimated pulmonary artery systolic pressure is 44 mmHg.    IVC/SVC: Intermediate venous pressure at 8 mmHg.    ASSESSMENT: Chronic diastolic HF    PLAN:      Patient Instructions:   Instruct the patient to notify this clinic if  HH, a physician or an advanced care provider wants to change medication one of their HF medications   Activity and Diet restrictions:   Recommend 2-3 gram sodium restriction and 1500cc- 2000cc fluid restriction.  Encourage physical activity with graded exercise program.  Requested patient to weigh themselves daily, and to notify us if their weight increases by more than 3 lbs in 1 day or 5 lbs in 3 days.    Assigned dry weight on home scale: unsure  Medication changes (include current dose and changed dose): NYHA Class III symptoms. Mild fluid volume on exam. Start jardiance 10mg daily to further optimize HFpEF GDMT. G3DD (IVS 1.11cm) with h/o AF, TAVR, lumbar stenosis, neuropathy. Amyloid labs ordered. Once AL amyloid r/o, plan for PYP.  Upcoming labs and date anticipated: RTC in 1 week for repeat labs and close follow up  Other diagnostic tests ordered: plan to introduce CMEMs next visit for ongoing volume management.    Transitional Care Note    Family and/or Caretaker present at visit?  Yes.  Diagnostic tests reviewed/disposition: I have reviewed all completed as well as pending diagnostic tests at the time of discharge.  Disease/illness education: CHF  Home health/community services discussion/referrals: Patient has home health established at [facility] .   Establishment or re-establishment of referral orders for community resources: No other necessary community resources.   Discussion with other health care providers: No discussion with other health care providers necessary.       Khloe Andujar PA-C

## 2023-10-02 ENCOUNTER — OFFICE VISIT (OUTPATIENT)
Dept: CARDIOLOGY | Facility: CLINIC | Age: 78
End: 2023-10-02
Payer: MEDICARE

## 2023-10-02 ENCOUNTER — DOCUMENTATION ONLY (OUTPATIENT)
Dept: CARDIOLOGY | Facility: CLINIC | Age: 78
End: 2023-10-02

## 2023-10-02 ENCOUNTER — LAB VISIT (OUTPATIENT)
Dept: LAB | Facility: HOSPITAL | Age: 78
End: 2023-10-02
Payer: MEDICARE

## 2023-10-02 VITALS
HEIGHT: 64 IN | DIASTOLIC BLOOD PRESSURE: 61 MMHG | HEART RATE: 69 BPM | WEIGHT: 243.63 LBS | OXYGEN SATURATION: 94 % | BODY MASS INDEX: 41.59 KG/M2 | SYSTOLIC BLOOD PRESSURE: 131 MMHG

## 2023-10-02 DIAGNOSIS — I35.0 AORTIC VALVE STENOSIS, ETIOLOGY OF CARDIAC VALVE DISEASE UNSPECIFIED: ICD-10-CM

## 2023-10-02 DIAGNOSIS — Z95.0 CARDIAC PACEMAKER IN SITU: ICD-10-CM

## 2023-10-02 DIAGNOSIS — I50.32 CHRONIC DIASTOLIC HEART FAILURE: Primary | ICD-10-CM

## 2023-10-02 DIAGNOSIS — R06.02 SOB (SHORTNESS OF BREATH): ICD-10-CM

## 2023-10-02 DIAGNOSIS — J42 CHRONIC BRONCHITIS, UNSPECIFIED CHRONIC BRONCHITIS TYPE: ICD-10-CM

## 2023-10-02 DIAGNOSIS — I10 ESSENTIAL HYPERTENSION: ICD-10-CM

## 2023-10-02 DIAGNOSIS — E78.5 HYPERLIPIDEMIA, UNSPECIFIED HYPERLIPIDEMIA TYPE: ICD-10-CM

## 2023-10-02 DIAGNOSIS — I25.10 CORONARY ARTERY DISEASE INVOLVING NATIVE CORONARY ARTERY OF NATIVE HEART WITHOUT ANGINA PECTORIS: ICD-10-CM

## 2023-10-02 DIAGNOSIS — I48.19 PERSISTENT ATRIAL FIBRILLATION: ICD-10-CM

## 2023-10-02 DIAGNOSIS — Z95.2 S/P TAVR (TRANSCATHETER AORTIC VALVE REPLACEMENT): ICD-10-CM

## 2023-10-02 LAB
ALBUMIN SERPL BCP-MCNC: 3.2 G/DL (ref 3.5–5.2)
ALP SERPL-CCNC: 40 U/L (ref 55–135)
ALT SERPL W/O P-5'-P-CCNC: 19 U/L (ref 10–44)
ANION GAP SERPL CALC-SCNC: 6 MMOL/L (ref 8–16)
AST SERPL-CCNC: 19 U/L (ref 10–40)
BASOPHILS # BLD AUTO: 0.04 K/UL (ref 0–0.2)
BASOPHILS NFR BLD: 0.5 % (ref 0–1.9)
BILIRUB SERPL-MCNC: 0.3 MG/DL (ref 0.1–1)
BNP SERPL-MCNC: 219 PG/ML (ref 0–99)
BUN SERPL-MCNC: 21 MG/DL (ref 8–23)
CALCIUM SERPL-MCNC: 9.5 MG/DL (ref 8.7–10.5)
CHLORIDE SERPL-SCNC: 93 MMOL/L (ref 95–110)
CO2 SERPL-SCNC: 37 MMOL/L (ref 23–29)
CREAT SERPL-MCNC: 0.8 MG/DL (ref 0.5–1.4)
DIFFERENTIAL METHOD: ABNORMAL
EOSINOPHIL # BLD AUTO: 0 K/UL (ref 0–0.5)
EOSINOPHIL NFR BLD: 0.4 % (ref 0–8)
ERYTHROCYTE [DISTWIDTH] IN BLOOD BY AUTOMATED COUNT: 13.6 % (ref 11.5–14.5)
EST. GFR  (NO RACE VARIABLE): >60 ML/MIN/1.73 M^2
GLUCOSE SERPL-MCNC: 91 MG/DL (ref 70–110)
HCT VFR BLD AUTO: 37.4 % (ref 37–48.5)
HGB BLD-MCNC: 11.6 G/DL (ref 12–16)
IMM GRANULOCYTES # BLD AUTO: 0.05 K/UL (ref 0–0.04)
IMM GRANULOCYTES NFR BLD AUTO: 0.7 % (ref 0–0.5)
LYMPHOCYTES # BLD AUTO: 1.3 K/UL (ref 1–4.8)
LYMPHOCYTES NFR BLD: 17.1 % (ref 18–48)
MAGNESIUM SERPL-MCNC: 1.5 MG/DL (ref 1.6–2.6)
MCH RBC QN AUTO: 29.9 PG (ref 27–31)
MCHC RBC AUTO-ENTMCNC: 31 G/DL (ref 32–36)
MCV RBC AUTO: 96 FL (ref 82–98)
MONOCYTES # BLD AUTO: 0.8 K/UL (ref 0.3–1)
MONOCYTES NFR BLD: 10.5 % (ref 4–15)
NEUTROPHILS # BLD AUTO: 5.4 K/UL (ref 1.8–7.7)
NEUTROPHILS NFR BLD: 70.8 % (ref 38–73)
NRBC BLD-RTO: 0 /100 WBC
PHOSPHATE SERPL-MCNC: 3.2 MG/DL (ref 2.7–4.5)
PLATELET # BLD AUTO: 377 K/UL (ref 150–450)
PMV BLD AUTO: 9.7 FL (ref 9.2–12.9)
POTASSIUM SERPL-SCNC: 4.5 MMOL/L (ref 3.5–5.1)
PROT SERPL-MCNC: 6.8 G/DL (ref 6–8.4)
RBC # BLD AUTO: 3.88 M/UL (ref 4–5.4)
SODIUM SERPL-SCNC: 136 MMOL/L (ref 136–145)
WBC # BLD AUTO: 7.6 K/UL (ref 3.9–12.7)

## 2023-10-02 PROCEDURE — 3078F PR MOST RECENT DIASTOLIC BLOOD PRESSURE < 80 MM HG: ICD-10-PCS | Mod: CPTII,S$GLB,,

## 2023-10-02 PROCEDURE — 1160F RVW MEDS BY RX/DR IN RCRD: CPT | Mod: CPTII,S$GLB,,

## 2023-10-02 PROCEDURE — 80053 COMPREHEN METABOLIC PANEL: CPT

## 2023-10-02 PROCEDURE — 1159F MED LIST DOCD IN RCRD: CPT | Mod: CPTII,S$GLB,,

## 2023-10-02 PROCEDURE — 3078F DIAST BP <80 MM HG: CPT | Mod: CPTII,S$GLB,,

## 2023-10-02 PROCEDURE — 3075F PR MOST RECENT SYSTOLIC BLOOD PRESS GE 130-139MM HG: ICD-10-PCS | Mod: CPTII,S$GLB,,

## 2023-10-02 PROCEDURE — 99999 PR PBB SHADOW E&M-EST. PATIENT-LVL V: CPT | Mod: PBBFAC,,,

## 2023-10-02 PROCEDURE — 1157F ADVNC CARE PLAN IN RCRD: CPT | Mod: CPTII,S$GLB,,

## 2023-10-02 PROCEDURE — 1159F PR MEDICATION LIST DOCUMENTED IN MEDICAL RECORD: ICD-10-PCS | Mod: CPTII,S$GLB,,

## 2023-10-02 PROCEDURE — 3075F SYST BP GE 130 - 139MM HG: CPT | Mod: CPTII,S$GLB,,

## 2023-10-02 PROCEDURE — 99496 TRANSITIONAL CARE MANAGE SERVICE 7 DAY DISCHARGE: ICD-10-PCS | Mod: S$GLB,,,

## 2023-10-02 PROCEDURE — 3288F PR FALLS RISK ASSESSMENT DOCUMENTED: ICD-10-PCS | Mod: CPTII,S$GLB,,

## 2023-10-02 PROCEDURE — 84100 ASSAY OF PHOSPHORUS: CPT

## 2023-10-02 PROCEDURE — 83735 ASSAY OF MAGNESIUM: CPT

## 2023-10-02 PROCEDURE — 1126F AMNT PAIN NOTED NONE PRSNT: CPT | Mod: CPTII,S$GLB,,

## 2023-10-02 PROCEDURE — 1101F PR PT FALLS ASSESS DOC 0-1 FALLS W/OUT INJ PAST YR: ICD-10-PCS | Mod: CPTII,S$GLB,,

## 2023-10-02 PROCEDURE — 1126F PR PAIN SEVERITY QUANTIFIED, NO PAIN PRESENT: ICD-10-PCS | Mod: CPTII,S$GLB,,

## 2023-10-02 PROCEDURE — 1111F PR DISCHARGE MEDS RECONCILED W/ CURRENT OUTPATIENT MED LIST: ICD-10-PCS | Mod: CPTII,S$GLB,,

## 2023-10-02 PROCEDURE — 99496 TRANSJ CARE MGMT HIGH F2F 7D: CPT | Mod: S$GLB,,,

## 2023-10-02 PROCEDURE — 85025 COMPLETE CBC W/AUTO DIFF WBC: CPT

## 2023-10-02 PROCEDURE — 1157F PR ADVANCE CARE PLAN OR EQUIV PRESENT IN MEDICAL RECORD: ICD-10-PCS | Mod: CPTII,S$GLB,,

## 2023-10-02 PROCEDURE — 83880 ASSAY OF NATRIURETIC PEPTIDE: CPT

## 2023-10-02 PROCEDURE — 1160F PR REVIEW ALL MEDS BY PRESCRIBER/CLIN PHARMACIST DOCUMENTED: ICD-10-PCS | Mod: CPTII,S$GLB,,

## 2023-10-02 PROCEDURE — 1111F DSCHRG MED/CURRENT MED MERGE: CPT | Mod: CPTII,S$GLB,,

## 2023-10-02 PROCEDURE — 3288F FALL RISK ASSESSMENT DOCD: CPT | Mod: CPTII,S$GLB,,

## 2023-10-02 PROCEDURE — 1101F PT FALLS ASSESS-DOCD LE1/YR: CPT | Mod: CPTII,S$GLB,,

## 2023-10-02 PROCEDURE — 99999 PR PBB SHADOW E&M-EST. PATIENT-LVL V: ICD-10-PCS | Mod: PBBFAC,,,

## 2023-10-02 NOTE — PROGRESS NOTES
"PT here with her  and a daughter for her first HFTCC visit. Hospital Education done for the benefit of the family members who were not present and to ensure understanding.    Reviewed the following key points of HFTCC program with pt:              1.) Take your medications as directed. Call Ms Andujar if any health Care Professional changes your Heart/Fluid medications.               2.) Weight yourself daily. Daily Dry Weights. Upon waking ,empty your bladder, weigh with as little clothes as possible before eating/drinking. Record weight in Symptom Tracker and compare these weights for fluid gain. Weight gain overnight of 3-4 lbs is Fluid, also a gain of 5 lbs in 3 days is Fluid as well. Call US!              3.) Follow low salt and limited fluid diet. Salt/Sodium Restriction 7778-2969 mg, see page 4. Sodium makes you hold onto Fluid. High Sodium Foods;Deli Meat/Cheese, Sausages/Hot Dogs, Fast Food/Restaurant Food, Anything in a box, bottle or can. Cook with Fresh or Frozen ingredients and use seasonings that are labeled NO SALT. Check Portion Sizes, Salt is reported for 1 portion. A can may contain 2-3 portions. Fluid Restriction 1.5 -2 Liters/Day, see page 6, measure all of your Fluid, the milk in your cereal, broth in your soup and ice cream because anything that melts at room temp is a liquid.              4.) Stop smoking and start exercising. Brisk walking is good, don't walk like you are going to the Hang[place] and DON"T WALK IN THE HEAT! Start low and increase as tolerated. Remember if you don't use it you lose it.              5.) Go to your appointments and call your team. Have your weight and BP/P ready when we call to do the Phone Check ins and call us if you have fluid gain or questions    Daughter has some questions written out which are related to why her Mother's heart has gotten worse. Daughter asks if it is possible that we make the phone calls on a Wednesday when one of the daughters is at the " house and I tell her of course we can. They have the HCG and will start charting the daily weights.

## 2023-10-02 NOTE — PATIENT INSTRUCTIONS
Start jardiance 10mg once daily.    Monitor weight and blood pressure daily. Notify us if you have worsening shortness of breath, swelling or 3lb weight gain on home scale.

## 2023-10-03 ENCOUNTER — TELEPHONE (OUTPATIENT)
Dept: CARDIOLOGY | Facility: CLINIC | Age: 78
End: 2023-10-03
Payer: MEDICARE

## 2023-10-03 NOTE — TELEPHONE ENCOUNTER
Dayanna calls with questions re her Mother's care . She asks how will she/they know when her Mom has too much Fluid or not enough. I explain  as I did with the rest of the family that fluid balance is like a see saw. Too much diuretic and your fluid goes down too much like a see saw goes down and your BP gets a little too low and your weight goes down. This is why we ask for the Daily Dry weights and the Daily BP/P measurements. Not enough diuretic and your weight goes up, your BP goes up and the see saw goes up. We want to keep your fluid, like a see saw, balanced. Dayanna also asks Ms. Andujar to call her Dad's Pharmacy b/c the Pharmacist will not fill the Jardiance Rx b/c her  Mom is also on metformin. Ms. Andujar asks who is the MD handling her Mom's Diabetes care and we are told Lisette Go. Ms. Andujar will see if he is agreeable to changing the metformin for Jardiance. I explain to dayanna that I will notify her of the outcome of this discussion. Dayanna also asks if the Echo that is scheduled can be D/C because one was done in the Hospital. I will take care of that. Dayanna has no other questions.

## 2023-10-04 ENCOUNTER — TELEPHONE (OUTPATIENT)
Dept: CARDIOLOGY | Facility: CLINIC | Age: 78
End: 2023-10-04
Payer: MEDICARE

## 2023-10-04 NOTE — TELEPHONE ENCOUNTER
Pt daughter, Tammy, called to f/u regarding starting Jardiance. Pt family member informed that Puja reached out to Dr. Go for his input on changing oral diabetic medication regimen. At the time of FM call, Dr. Go's office hasn't responded to HFTCC. RN encouraged pt FM to f/u with endocrinologist for recommendations.

## 2023-10-06 ENCOUNTER — TELEPHONE (OUTPATIENT)
Dept: CARDIOLOGY | Facility: CLINIC | Age: 78
End: 2023-10-06
Payer: MEDICARE

## 2023-10-06 NOTE — TELEPHONE ENCOUNTER
Pt contacted and inquired r/t lab test f/u for new medication, pt has not started taking the Jardiance. Her PCP approved  both metformin and Jardiance for concurrent treatment. RN encouraged her to track her FBS and report low BS greater than 3 days in a row to us or her endocrinologist for adjustments. Pt educated on sxs of hypoglycemia and instructions to raise BS if it is low. SILVIA recommendations ending.    @1007: SILVIA stated pt can have a phone f/u with [facility] on 10/10. She will start Jardiance on 10/10 after her abx therapy is complete.  Pt to be seen on October 17 for in-person f/u.    @1008: Pt informed of the plan above. Pt instructed to contact [facility] if any HF sxs occur before he in-person visit. Additionally pt is to report low FBS x3 days or more before her visit on October 17, 2023. Pt verbalized understanding.

## 2023-10-10 ENCOUNTER — TELEPHONE (OUTPATIENT)
Dept: CARDIOLOGY | Facility: CLINIC | Age: 78
End: 2023-10-10
Payer: MEDICARE

## 2023-10-10 ENCOUNTER — CLINICAL SUPPORT (OUTPATIENT)
Dept: CARDIOLOGY | Facility: CLINIC | Age: 78
End: 2023-10-10
Payer: MEDICARE

## 2023-10-10 DIAGNOSIS — Z95.0 CARDIAC PACEMAKER IN SITU: Primary | ICD-10-CM

## 2023-10-10 PROCEDURE — 93296 PR INTERROG EVAL, REMOTE, UP TO 90 DAYS, PACER/DEFIB,TECH REVIEW: ICD-10-PCS | Mod: S$GLB,,, | Performed by: INTERNAL MEDICINE

## 2023-10-10 PROCEDURE — 93294 REM INTERROG EVL PM/LDLS PM: CPT | Mod: S$GLB,,, | Performed by: INTERNAL MEDICINE

## 2023-10-10 PROCEDURE — 93296 REM INTERROG EVL PM/IDS: CPT | Mod: S$GLB,,, | Performed by: INTERNAL MEDICINE

## 2023-10-10 PROCEDURE — 93294 PR INTERROG EVAL, REMOTE, UP TO 90 DAYS,PACEMAKER: ICD-10-PCS | Mod: S$GLB,,, | Performed by: INTERNAL MEDICINE

## 2023-10-10 NOTE — TELEPHONE ENCOUNTER
"Heart Failure Transitional Care Clinic(HFTCC) weekly phone follow up / triage call completed.     TCC RN Navigator spoke with pt, Ms Wright    Current Patient reported weight: 240 lbs     Recent Patient reported blood pressure and heart rate: BP: 123/67 HR: 69 O2: 95% RA    RN provided education r/t correct Pulse oximeter use for a pt. RN helped pt identify the two display numbers on the screen as the P or HR and SaO2 to measure oxygen. Pt stated she never noticed the two numbers before.      Pt reports the following:  []  Shortness of Breath with Activity  []  Shortness of Breath at rest   []  Fatigue  [x]  Edema: at pt baseline  [] Chest pain or tightness  [] Weight Increase since discharge  [x] None of the above    Pt reports using "Daily weight and symptom tracker".    Pt reports being in the GREEN Zone. If in yellow/red, reminded that they should be calling HFTCC today or now.     Medications:   Medication compliance reviewed with pt.  Pt reports having medication list available and has all medications at home for use per list.   Pt denies needs and questions r/t medications at this time.    Education:   Confirmed pt still has "Heart Failure Transitional Care Clinic Home Care Guide"  .     Reminded of key points as listed below.     Recommend 2 -3 gram sodium restriction and 1500 cc-2000 cc fluid restriction.  Encourage physical activity with graded exercise program.  Requested patient to weigh themselves daily, and to notify us if their weight increases by more than 3 lbs in 1 day or 5 lbs in 3 days.   Reminded to use "Daily weight and symptom tracker".  Even if pt does not have a scale, to use symptom tracker.       Watch for these Signs and Symptoms: If any of these occur, contact HFTCC immediately:   Increase in shortness of breath with movement   Increase in swelling in your legs and ankles   Weight gain of more than 3 pounds in a day or 5 pounds in 3 days.   Difficulty breathing when you are lying " down   Worsening fatigue or tiredness   Stomach bloating, a full feeling or a loss of appetite   Increased coughing--especially when you are lying down      Pt was able to verbalize back to RN in their own words correct diet/fluid restrictions, necessity for exercise, warning signs and symptoms, when and how to contact their HFTCC team.      Pt reminded of upcoming appointment.  PT reports they will attend. Pt next appt on October 17, 2023 at 1030 AM.      Pt reminded of how and when to contact [facility]:  [phone] (Mon-Fri, 8a-5p) & for urgent issues on the weekend to page the Heart Transplant MD on call.  Pt also encouraged utilize [facility] messaging as well.      Pt verbalized understanding and in agreement of plan.       Will follow up with pt at next clinic visit and RN navigator available for pt questions, issues or concerns.

## 2023-10-16 NOTE — PROGRESS NOTES
HF TCC Provider Note (Follow-up) Consult Note      HPI:     Patient reports improvement in SOB. Now wearing 2L supplemental O2 prn with exertion. SpO2 91% on RA at rest in clinic. Can ambulate within home and pace slow. Completed HH PT/OT. Presents to clinic in wheelchair.              Patient sleeps on 2 number of pillows with wedge pillow. Unable to lay flat at baseline              Patient wakes up SOB, has to get out of bed, associated cough- denies              Palpitations - denies               Dizzy, light-headed, pre-syncope or syncope- endorses single episode of nausea/dizziness since last visit, resolved with prn nausea med. Denies pre-syncope/syncope              Since discharge frequency of performing weights, home weight and weight change- weight on home scale downtrending, 243->237lbs              Other information felt pertinent to HPI: Ms. Adriana Strong is a 77 yo female with a PMHx of COPD, HFpEF, CAD, persistent AF s/p PPM on eliquis, AS s/p TAVR, HLD, HTN, DM2, mesenteric ischemia [facility] for [facility]HF. Workup in ED remarkable for tachypnea/hypoxia requiring 2L NC, Hb 10.9, Na 128, Cl 89, , Trop neg, CXR with no acute findings. She received dose of lasix 40mg in ED and then diuresed with IVP lasix 40mg BID. 6MWT qualifying for home O2, arranged prior to discharge.     10/17/23: Today she reports improvement in breathing/BLE edema since last visit. Only SOB on more significant exertion, now wearing supplemental O2 prn. Started jardiance last Wednesday. Monitoring BG without episodes of hypoglycemia. Softer BP in clinic today (but home log consisently 130-120s/70s).       PHYSICAL:   Vitals:    10/17/23 1010   BP: (!) 96/54   Pulse: 69      Wt Readings from Last 3 Encounters:   10/17/23 109 kg (240 lb 3.1 oz)   10/02/23 110.5 kg (243 lb 9.6 oz)   09/23/23 115 kg (253 lb 8.5 oz)       JVD: no, difficult to assess 2/2 body habitus   Heart rhythm:  regular  Cardiac murmur: No    S3: no  S4: yes  Lungs: clear  Hepatojugular reflux: to clavicle  Edema: trace edema in LLE ankle    Lab Results   Component Value Date     (L) 10/17/2023    K 4.9 10/17/2023    MG 1.8 10/17/2023    CL 96 10/17/2023    CO2 27 10/17/2023    BUN 19 10/17/2023    CREATININE 1.2 10/17/2023     (H) 10/17/2023    CALCIUM 9.6 10/17/2023    AST 19 10/17/2023    ALT 17 10/17/2023    ALBUMIN 3.8 10/17/2023    PROT 6.9 10/17/2023    BILITOT 0.5 10/17/2023     Lab Results   Component Value Date     (H) 10/02/2023     (H) 09/21/2023     (H) 09/30/2022       ASSESSMENT: Chronic diastolic HF    PLAN:      Patient Instructions:   Instruct the patient to notify this clinic if HH, a physician or an advanced care provider wants to change medication one of their HF medications   Activity and Diet restrictions:   Recommend 2-3 gram sodium restriction and 1500cc- 2000cc fluid restriction.  Encourage physical activity with graded exercise program.  Requested patient to weigh themselves daily, and to notify us if their weight increases by more than 3 lbs in 1 day or 5 lbs in 3 days.    Assigned dry weight on home scale: unsure. Down 6lbs on home scale  Medication changes (include current dose and changed dose): NYHA Class III symptoms. Well compensated on exam. Slight bump in Cr from baseline (.7-> 1.2). Reduce lasix to 40mg in the morning and 20mg in the afternoon. Consider reducing further next visit. G3DD (IVS 1.11cm) with h/o AF, TAVR, lumbar stenosis, neuropathy. Amyloid labs ordered. Once AL amyloid r/o, plan for PYP.    RD education provided during visit.    Upcoming labs and date anticipated: RTC in 1.5 weeks for repeat labs and close follow up    Other diagnostic tests ordered: plan to introduce CMEMs in future visits for ongoing volume management.     Khloe Andujar PA-C

## 2023-10-17 ENCOUNTER — LAB VISIT (OUTPATIENT)
Dept: LAB | Facility: HOSPITAL | Age: 78
End: 2023-10-17
Payer: MEDICARE

## 2023-10-17 ENCOUNTER — NUTRITION (OUTPATIENT)
Dept: TRANSPLANT | Facility: CLINIC | Age: 78
End: 2023-10-17
Payer: MEDICARE

## 2023-10-17 ENCOUNTER — OFFICE VISIT (OUTPATIENT)
Dept: CARDIOLOGY | Facility: CLINIC | Age: 78
End: 2023-10-17
Payer: MEDICARE

## 2023-10-17 VITALS
HEART RATE: 69 BPM | SYSTOLIC BLOOD PRESSURE: 96 MMHG | DIASTOLIC BLOOD PRESSURE: 54 MMHG | BODY MASS INDEX: 41.01 KG/M2 | HEIGHT: 64 IN | OXYGEN SATURATION: 91 % | WEIGHT: 240.19 LBS

## 2023-10-17 DIAGNOSIS — I25.10 CORONARY ARTERY DISEASE INVOLVING NATIVE CORONARY ARTERY OF NATIVE HEART WITHOUT ANGINA PECTORIS: ICD-10-CM

## 2023-10-17 DIAGNOSIS — Z95.0 CARDIAC PACEMAKER IN SITU: ICD-10-CM

## 2023-10-17 DIAGNOSIS — J42 CHRONIC BRONCHITIS, UNSPECIFIED CHRONIC BRONCHITIS TYPE: ICD-10-CM

## 2023-10-17 DIAGNOSIS — I10 ESSENTIAL HYPERTENSION: ICD-10-CM

## 2023-10-17 DIAGNOSIS — Z95.2 S/P TAVR (TRANSCATHETER AORTIC VALVE REPLACEMENT): ICD-10-CM

## 2023-10-17 DIAGNOSIS — I35.0 AORTIC VALVE STENOSIS, ETIOLOGY OF CARDIAC VALVE DISEASE UNSPECIFIED: ICD-10-CM

## 2023-10-17 DIAGNOSIS — I50.32 CHRONIC DIASTOLIC HEART FAILURE: Primary | ICD-10-CM

## 2023-10-17 DIAGNOSIS — Z78.9 NO ADDED SALT DIET: Primary | ICD-10-CM

## 2023-10-17 DIAGNOSIS — R06.02 SOB (SHORTNESS OF BREATH): ICD-10-CM

## 2023-10-17 DIAGNOSIS — E78.5 HYPERLIPIDEMIA, UNSPECIFIED HYPERLIPIDEMIA TYPE: ICD-10-CM

## 2023-10-17 DIAGNOSIS — I48.19 PERSISTENT ATRIAL FIBRILLATION: ICD-10-CM

## 2023-10-17 LAB
ALBUMIN SERPL BCP-MCNC: 3.8 G/DL (ref 3.5–5.2)
ALP SERPL-CCNC: 40 U/L (ref 55–135)
ALT SERPL W/O P-5'-P-CCNC: 17 U/L (ref 10–44)
ANION GAP SERPL CALC-SCNC: 12 MMOL/L (ref 8–16)
AST SERPL-CCNC: 19 U/L (ref 10–40)
BILIRUB SERPL-MCNC: 0.5 MG/DL (ref 0.1–1)
BNP SERPL-MCNC: 223 PG/ML (ref 0–99)
BUN SERPL-MCNC: 19 MG/DL (ref 8–23)
CALCIUM SERPL-MCNC: 9.6 MG/DL (ref 8.7–10.5)
CHLORIDE SERPL-SCNC: 96 MMOL/L (ref 95–110)
CO2 SERPL-SCNC: 27 MMOL/L (ref 23–29)
CREAT SERPL-MCNC: 1.2 MG/DL (ref 0.5–1.4)
EST. GFR  (NO RACE VARIABLE): 45.8 ML/MIN/1.73 M^2
GLUCOSE SERPL-MCNC: 115 MG/DL (ref 70–110)
MAGNESIUM SERPL-MCNC: 1.8 MG/DL (ref 1.6–2.6)
PHOSPHATE SERPL-MCNC: 3.9 MG/DL (ref 2.7–4.5)
POTASSIUM SERPL-SCNC: 4.9 MMOL/L (ref 3.5–5.1)
PROT SERPL-MCNC: 6.9 G/DL (ref 6–8.4)
SODIUM SERPL-SCNC: 135 MMOL/L (ref 136–145)

## 2023-10-17 PROCEDURE — 1126F AMNT PAIN NOTED NONE PRSNT: CPT | Mod: CPTII,S$GLB,,

## 2023-10-17 PROCEDURE — 1101F PT FALLS ASSESS-DOCD LE1/YR: CPT | Mod: CPTII,S$GLB,,

## 2023-10-17 PROCEDURE — 83880 ASSAY OF NATRIURETIC PEPTIDE: CPT

## 2023-10-17 PROCEDURE — 3074F SYST BP LT 130 MM HG: CPT | Mod: CPTII,S$GLB,,

## 2023-10-17 PROCEDURE — 99999 PR PBB SHADOW E&M-EST. PATIENT-LVL V: ICD-10-PCS | Mod: PBBFAC,,,

## 2023-10-17 PROCEDURE — 1101F PR PT FALLS ASSESS DOC 0-1 FALLS W/OUT INJ PAST YR: ICD-10-PCS | Mod: CPTII,S$GLB,,

## 2023-10-17 PROCEDURE — 1111F DSCHRG MED/CURRENT MED MERGE: CPT | Mod: CPTII,S$GLB,,

## 2023-10-17 PROCEDURE — 1111F PR DISCHARGE MEDS RECONCILED W/ CURRENT OUTPATIENT MED LIST: ICD-10-PCS | Mod: CPTII,S$GLB,,

## 2023-10-17 PROCEDURE — 80053 COMPREHEN METABOLIC PANEL: CPT

## 2023-10-17 PROCEDURE — 1160F PR REVIEW ALL MEDS BY PRESCRIBER/CLIN PHARMACIST DOCUMENTED: ICD-10-PCS | Mod: CPTII,S$GLB,,

## 2023-10-17 PROCEDURE — 97802 PR MED NUTR THER, 1ST, INDIV, EA 15 MIN: ICD-10-PCS | Mod: S$GLB,,,

## 2023-10-17 PROCEDURE — 99214 OFFICE O/P EST MOD 30 MIN: CPT | Mod: S$GLB,,,

## 2023-10-17 PROCEDURE — 1159F PR MEDICATION LIST DOCUMENTED IN MEDICAL RECORD: ICD-10-PCS | Mod: CPTII,S$GLB,,

## 2023-10-17 PROCEDURE — 1157F PR ADVANCE CARE PLAN OR EQUIV PRESENT IN MEDICAL RECORD: ICD-10-PCS | Mod: CPTII,S$GLB,,

## 2023-10-17 PROCEDURE — 83735 ASSAY OF MAGNESIUM: CPT

## 2023-10-17 PROCEDURE — 99214 PR OFFICE/OUTPT VISIT, EST, LEVL IV, 30-39 MIN: ICD-10-PCS | Mod: S$GLB,,,

## 2023-10-17 PROCEDURE — 1126F PR PAIN SEVERITY QUANTIFIED, NO PAIN PRESENT: ICD-10-PCS | Mod: CPTII,S$GLB,,

## 2023-10-17 PROCEDURE — 3078F PR MOST RECENT DIASTOLIC BLOOD PRESSURE < 80 MM HG: ICD-10-PCS | Mod: CPTII,S$GLB,,

## 2023-10-17 PROCEDURE — 1157F ADVNC CARE PLAN IN RCRD: CPT | Mod: CPTII,S$GLB,,

## 2023-10-17 PROCEDURE — 84100 ASSAY OF PHOSPHORUS: CPT

## 2023-10-17 PROCEDURE — 1159F MED LIST DOCD IN RCRD: CPT | Mod: CPTII,S$GLB,,

## 2023-10-17 PROCEDURE — 99999 PR PBB SHADOW E&M-EST. PATIENT-LVL V: CPT | Mod: PBBFAC,,,

## 2023-10-17 PROCEDURE — 3288F PR FALLS RISK ASSESSMENT DOCUMENTED: ICD-10-PCS | Mod: CPTII,S$GLB,,

## 2023-10-17 PROCEDURE — 3074F PR MOST RECENT SYSTOLIC BLOOD PRESSURE < 130 MM HG: ICD-10-PCS | Mod: CPTII,S$GLB,,

## 2023-10-17 PROCEDURE — 1160F RVW MEDS BY RX/DR IN RCRD: CPT | Mod: CPTII,S$GLB,,

## 2023-10-17 PROCEDURE — 3078F DIAST BP <80 MM HG: CPT | Mod: CPTII,S$GLB,,

## 2023-10-17 PROCEDURE — 3288F FALL RISK ASSESSMENT DOCD: CPT | Mod: CPTII,S$GLB,,

## 2023-10-17 PROCEDURE — 97802 MEDICAL NUTRITION INDIV IN: CPT | Mod: S$GLB,,,

## 2023-10-17 RX ORDER — FUROSEMIDE 40 MG/1
TABLET ORAL
Qty: 60 TABLET | Refills: 0
Start: 2023-10-17 | End: 2023-10-27 | Stop reason: SDUPTHER

## 2023-10-17 NOTE — PROGRESS NOTES
TRANSPLANT NUTRITIONAL ASSESSMENT    Referring Provider: Khloe Andujar PA-C    Reason for Visit: cardiac diet / fluid res     Age: 78 y.o.  Sex: female    Patient Active Problem List   Diagnosis    Enlarged ovary    Type 2 diabetes mellitus    Severe persistent asthma with acute exacerbation    HLD (hyperlipidemia)    GERD (gastroesophageal reflux disease)    Chronic mesenteric ischemia    Mesenteric artery insufficiency    Gastroesophageal reflux disease    Essential hypertension    Pure hypercholesterolemia    Persistent atrial fibrillation    Old lacunar stroke without late effect    COPD exacerbation    Constipation    Aortic stenosis    Umbilical hernia without obstruction and without gangrene    Incisional hernia, without obstruction or gangrene    COPD (chronic obstructive pulmonary disease)    Costochondritis    Diabetic polyneuropathy associated with type 2 diabetes mellitus    DDD (degenerative disc disease), lumbar    Cataract    Low back pain    Difficulty walking    Muscle weakness    Balance problems    Decreased range of motion of trunk and back    Lumbar spondylosis    Neuroforaminal stenosis of lumbar spine    Spinal stenosis of lumbar region with neurogenic claudication    Lumbar radiculopathy    S/P TAVR (transcatheter aortic valve replacement)    Morbid obesity    Vasovagal syncope    TACOS (acute kidney injury)    Headache    Atrial flutter    Hyponatremia    Leukocytosis    Dysuria    Comfort measures only status    Acute cystitis with hematuria    Other chest pain    CAD (coronary artery disease)    Asthma in adult    Acute on chronic diastolic (congestive) heart failure    Chronic atrial fibrillation    BMI 40.0-44.9, adult    Localized edema    History of transcatheter aortic valve replacement (TAVR)    Coronary artery disease involving native coronary artery of native heart without angina pectoris    Aortic atherosclerosis    Numbness and tingling    Seizure-like activity    Acute on chronic  heart failure with preserved ejection fraction    Occlusion of superior mesenteric artery    Cardiac pacemaker in situ    Chest discomfort    Acute hypoxemic respiratory failure     Past Medical History:   Diagnosis Date    Acute on chronic diastolic (congestive) heart failure 11/20/2019    Anticoagulant long-term use     on Plavix since May 2015    Anxiety     Arthritis     Asthma     Atrial fibrillation     Atrial fibrillation with RVR 10/19/2020    Cataracts, bilateral     Chest pain, atypical     Chronic atrial fibrillation with rapid ventricular response 6/23/2017    Colon polyp     Coronary artery disease     Diabetes mellitus     Dry eyes     Esophageal erosions     GERD (gastroesophageal reflux disease)     Heart murmur     Hemorrhoid     High cholesterol     Hypertension     Irritable bowel syndrome     Paroxysmal atrial fibrillation 10/30/2020    Persistent atrial fibrillation 2/10/2020    Shingles 2015    Stenosis of aortic and mitral valves     Stroke     TIA (transient ischemic attack)     Use of cane as ambulatory aid      Lab Results   Component Value Date     (H) 10/17/2023    K 4.9 10/17/2023    PHOS 3.9 10/17/2023    MG 1.8 10/17/2023    CHOL 149 03/22/2023    HDL 63 03/22/2023    TRIG 118 03/22/2023    ALBUMIN 3.8 10/17/2023    PREALBUMIN 23 04/21/2011    HGBA1C 5.3 09/21/2023    CALCIUM 9.6 10/17/2023     Other Pertinent Labs: Na: 135 (L), GFR: 45.8 (L)    Current Outpatient Medications   Medication Sig    acetaminophen (TYLENOL) 650 MG TbSR Take 1,300 mg by mouth 2 (two) times daily as needed.    albuterol-ipratropium (DUO-NEB) 2.5 mg-0.5 mg/3 mL nebulizer solution Take 3 mLs by nebulization every 4 (four) hours as needed for Wheezing or Shortness of Breath.    apixaban (ELIQUIS) 5 mg Tab TAKE 1 TABLET BY MOUTH TWICE A DAY    ascorbic acid, vitamin C, (VITAMIN C) 500 MG tablet Take 1,000 mg by mouth once daily.     atorvastatin (LIPITOR) 10 MG tablet Take 1 tablet (10 mg total) by mouth  once daily.    calcium carbonate/vitamin D3 (CALTRATE 600 + D ORAL) Take 1 tablet by mouth once daily.    carvediloL (COREG) 12.5 MG tablet Take 1 tablet (12.5 mg total) by mouth 2 (two) times daily.    clopidogreL (PLAVIX) 75 mg tablet TAKE 1 TABLET BY MOUTH EVERY DAY    diltiaZEM (CARDIZEM CD) 240 MG 24 hr capsule TAKE 1 CAPSULE BY MOUTH ONCE A DAY    DULoxetine (CYMBALTA) 20 MG capsule Take 1 capsule (20 mg total) by mouth once daily.    empagliflozin (JARDIANCE) 10 mg tablet Take 1 tablet (10 mg total) by mouth once daily.    fexofenadine (ALLEGRA) 60 MG tablet Take 60 mg by mouth once daily.    fish oil-omega-3 fatty acids 300-1,000 mg capsule Take 1 g by mouth once daily.     furosemide (LASIX) 40 MG tablet Take 1 tablet (40 mg total) by mouth 2 (two) times a day.    gabapentin (NEURONTIN) 600 MG tablet Take 600 mg by mouth 3 (three) times daily.    isosorbide mononitrate (IMDUR) 60 MG 24 hr tablet Take 1 tablet (60 mg total) by mouth once daily.    LORazepam (ATIVAN) 0.5 MG tablet Take 1 tablet (0.5 mg total) by mouth every morning.    losartan (COZAAR) 25 MG tablet Take 1 tablet (25 mg total) by mouth once daily.    metFORMIN (GLUCOPHAGE) 500 MG tablet Take 1 tablet (500 mg total) by mouth 2 (two) times daily.    montelukast (SINGULAIR) 10 mg tablet Take 10 mg by mouth once daily.    mv-mn/folic ac/calcium/vit K1 (WOMEN'S 50 PLUS MULTIVITAMIN ORAL) Take 1 tablet by mouth once daily.    nitroGLYCERIN (NITROSTAT) 0.4 MG SL tablet Place 1 tablet (0.4 mg total) under the tongue every 5 (five) minutes as needed for Chest pain.    oxybutynin (DITROPAN-XL) 5 MG TR24 Take 1 tablet (5 mg total) by mouth every other day.    pantoprazole (PROTONIX) 40 MG tablet Take 1 tablet (40 mg total) by mouth once daily.    phenazopyridine HCl (PYRIDIUM ORAL) Take 1 tablet by mouth 3 (three) times daily as needed.    POLYSORBATE 80/GLYCERIN (REFRESH DRY EYE THERAPY OPHT) Apply to eye 2 (two) times daily.    potassium gluconate 550  "mg (90 mg) Tab Take 180 mg by mouth 2 (two) times a day.    ranolazine (RANEXA) 1,000 mg Tb12 Take 1 tablet (1,000 mg total) by mouth 2 (two) times daily.    SYMBICORT 160-4.5 mcg/actuation HFAA Inhale 1 puff into the lungs 2 (two) times daily.    vitamin D (VITAMIN D3) 1000 units Tab Take 2,000 Units by mouth once daily.    ZINC ORAL Take 1 tablet by mouth once daily.     No current facility-administered medications for this visit.     Facility-Administered Medications Ordered in Other Visits   Medication    0.9%  NaCl infusion    sodium chloride 0.9% flush 5 mL     Allergies: Celebrex [celecoxib], Ciprofloxacin, Dicyclomine, Adhesive, Avelox [moxifloxacin], Cilostazol, Eryc [erythromycin], Iodine and iodide containing products, Keflex [cephalexin], Meclomen, Meloxicam, Metoclopramide, and Sulfa (sulfonamide antibiotics)    Ht Readings from Last 1 Encounters:   10/17/23 5' 4" (1.626 m)     Wt Readings from Last 1 Encounters:   10/17/23 109 kg (240 lb 3.1 oz)      BMI: 41.23 kg/m²     Usual Weight: 108 kg (238 LBS)  Weight Change/Time: 243 last visit / 15 days ago  Current Diet: gato / fluid res  Appetite/Current Intake: fair   Exercise/Physical Activity: sedentary   Nutritional/Herbal Supplements: none  Potential Food/Medication Interactions: Lipitor: avoid fatty foods and alcohol, Jardiance: avoid grapefruit,   Chewing/Swallowing Problems: none  Symptoms: none  Assessment of Lab Values: t2dm , cardiac dysfunction   Support System:  / daughter    ABW: 150 lbs (68 kg)  Estimated Kcal Need: 1704 kcals (25 kcals/kg abw)  Estimated Protein Need: 68 (1.0 g/kg abw)    Nutritional History: 3 meals per day. Breakfast is oatmeal. Lunch and dinner are [employer] meals or baked fish/chicken with squash, cauliflower, or brussels sprouts. Pt drinks 29 - 47 oz of fluid per day    Nutritional Diagnoses  Problem: inadequate fluid intake  Etiology: inconsistent fluid intake for cardiac dysfunction and kidney " health   Symptoms: nutr hist    Educational Need? yes  Barriers: none identified  Discussed with: patient and spouse  Interventions: Patient taught nutrition information regarding cardiac diet/ fluid res   .  Pt was educated on how to read nutrition labels to understand food has natural sodium present. Recc'd of 600mg Na per meal. Pt recc drink no more than 2000ml  but more than 1000ml per day. pt given contact info if questions or needs arise   Goals/Recommendations: diet adherence and limit fluid intake  Actions Taken: instruct/provide written information  Strategies Used: problem solving, goal setting, motivational interviewing  Patient and/or family comprehend instructions: yes , adherence expected  Outcome: Verbalizes understanding  Monitoring: Contact information provided, will f/u in clinic and communicate with the care team as needed.     Counseling Time: 15 minutes

## 2023-10-19 RX ORDER — RANOLAZINE 1000 MG/1
1000 TABLET, EXTENDED RELEASE ORAL 2 TIMES DAILY
Qty: 180 TABLET | Refills: 3 | Status: SHIPPED | OUTPATIENT
Start: 2023-10-19 | End: 2024-10-18

## 2023-10-19 NOTE — PROGRESS NOTES
Subjective:      Patient ID: Adriana Strong is a 78 y.o. female.    Chief Complaint: No chief complaint on file.    HPI:    ROS Biotronik pacemaker remote interrogation report downloaded and prepared by medical assistant and interpreted by me and full report scanned into Epic media.  Battery OK with ISAI at 80%. Leads OK.   Know persistent atrial fibrillation on Eliquis and carvedilol..  Normal device function.    Past Medical History:   Diagnosis Date    Acute on chronic diastolic (congestive) heart failure 11/20/2019    Anticoagulant long-term use     on Plavix since May 2015    Anxiety     Arthritis     Asthma     Atrial fibrillation     Atrial fibrillation with RVR 10/19/2020    Cataracts, bilateral     Chest pain, atypical     Chronic atrial fibrillation with rapid ventricular response 6/23/2017    Colon polyp     Coronary artery disease     Diabetes mellitus     Dry eyes     Esophageal erosions     GERD (gastroesophageal reflux disease)     Heart murmur     Hemorrhoid     High cholesterol     Hypertension     Irritable bowel syndrome     Paroxysmal atrial fibrillation 10/30/2020    Persistent atrial fibrillation 2/10/2020    Shingles 2015    Stenosis of aortic and mitral valves     Stroke     TIA (transient ischemic attack)     Use of cane as ambulatory aid         Past Surgical History:   Procedure Laterality Date    ABDOMINAL SURGERY      stents to SMA    angiocele      2007, 2014    AORTIC VALVE REPLACEMENT N/A 11/19/2019    Procedure: Replacement-valve-aortic;  Surgeon: Jack Capone MD;  Location: [facility] CATH LAB;  Service: Cardiology;  Laterality: N/A;    BREAST SURGERY      left--- a lump--- no cancer    CARDIAC VALVE SURGERY      COLONOSCOPY  2014    COLONOSCOPY N/A 3/14/2018    Procedure: COLONOSCOPY;  Surgeon: Efren Kemp MD;  Location: [facility] ENDO ([address] [place] [address]);  Service: Endoscopy;  Laterality: N/A;  ok to hold Plavix 5 days prior to procedure per Dr RESHMA Hernandez     ok per Dr Kemp to hold Eliquis 2 days  prior to procedure (see telephone encounter dated-2/7/18)    HERNIA REPAIR      HYSTERECTOMY  partial    1982 partial hysterectomy    LEFT HEART CATHETERIZATION Right 11/5/2019    Procedure: Left heart cath;  Surgeon: Jack Capone MD;  Location: [facility] CATH LAB;  Service: Cardiology;  Laterality: Right;    left nasal polyp      left neck lymph node      nose polyp      right hip fatty mass tissue      stent to small intestine      SUPERIOR VENA CAVA ANGIOPLASTY / STENTING      TONSILLECTOMY      TOTAL ABDOMINAL HYSTERECTOMY      2014    TOTAL KNEE ARTHROPLASTY Bilateral     TREATMENT OF CARDIAC ARRHYTHMIA N/A 2/10/2020    Procedure: CARDIOVERSION;  Surgeon: Jayy Parrish MD;  Location: [facility] EP LAB;  Service: Cardiology;  Laterality: N/A;  AF, PEDRO, DCCV, MAC, DM, 3 Prep    TREATMENT OF CARDIAC ARRHYTHMIA N/A 5/15/2020    Procedure: CARDIOVERSION;  Surgeon: Hany Bee MD;  Location: [facility] EP LAB;  Service: Cardiology;  Laterality: N/A;  AF, PEDRO, DCCV, MAC, DM, 3 Prep    UPPER GASTROINTESTINAL ENDOSCOPY  2014       Family History   Problem Relation Age of Onset    Heart attack Mother     Stroke Father     Heart failure Brother     Colon cancer Neg Hx     Inflammatory bowel disease Neg Hx        Social History     Socioeconomic History    Marital status:    Tobacco Use    Smoking status: Never    Smokeless tobacco: Never   Substance and Sexual Activity    Alcohol use: No    Drug use: No    Sexual activity: Not Currently     Social Determinants of Health     Financial Resource Strain: Low Risk  (9/25/2023)    Overall Financial Resource Strain (CARDIA)     Difficulty of Paying Living Expenses: Not hard at all   Food Insecurity: No Food Insecurity (9/25/2023)    Hunger Vital Sign     Worried About Running Out of Food in the Last Year: Never true     Ran Out of Food in the Last Year: Never true   Transportation Needs: No Transportation Needs (9/25/2023)    PRAPARE - Transportation     Lack of Transportation  (Medical): No     Lack of Transportation (Non-Medical): No   Physical Activity: Inactive (9/25/2023)    Exercise Vital Sign     Days of Exercise per Week: 0 days     Minutes of Exercise per Session: 0 min   Stress: No Stress Concern Present (9/25/2023)    [employer] [employer] - Occupational Stress Questionnaire     Feeling of Stress : Only a little   Social Connections: Moderately Integrated (9/25/2023)    Social Connection and Isolation Panel [NHANES]     Frequency of Communication with Friends and Family: More than three times a week     Frequency of Social Gatherings with Friends and Family: More than three times a week     Attends [employer] Services: More than 4 times per year     Active Member of Clubs or Organizations: No     Attends Club or Organization Meetings: Never     Marital Status:    Housing Stability: Unknown (9/25/2023)    Housing Stability Vital Sign     Unable to Pay for Housing in the Last Year: No     Unstable Housing in the Last Year: No       Current Outpatient Medications on File Prior to Visit   Medication Sig Dispense Refill    acetaminophen (TYLENOL) 650 MG TbSR Take 1,300 mg by mouth 2 (two) times daily as needed.      albuterol-ipratropium (DUO-NEB) 2.5 mg-0.5 mg/3 mL nebulizer solution Take 3 mLs by nebulization every 4 (four) hours as needed for Wheezing or Shortness of Breath.      apixaban (ELIQUIS) 5 mg Tab TAKE 1 TABLET BY MOUTH TWICE A  tablet 3    ascorbic acid, vitamin C, (VITAMIN C) 500 MG tablet Take 1,000 mg by mouth once daily.       atorvastatin (LIPITOR) 10 MG tablet Take 1 tablet (10 mg total) by mouth once daily. 30 tablet 0    calcium carbonate/vitamin D3 (CALTRATE 600 + D ORAL) Take 1 tablet by mouth once daily.      carvediloL (COREG) 12.5 MG tablet Take 1 tablet (12.5 mg total) by mouth 2 (two) times daily. 180 tablet 3    clopidogreL (PLAVIX) 75 mg tablet TAKE 1 TABLET BY MOUTH EVERY DAY 90 tablet 3    diltiaZEM (CARDIZEM CD) 240 MG  24 hr capsule TAKE 1 CAPSULE BY MOUTH ONCE A DAY 90 capsule 3    DULoxetine (CYMBALTA) 20 MG capsule Take 1 capsule (20 mg total) by mouth once daily. 30 capsule 0    empagliflozin (JARDIANCE) 10 mg tablet Take 1 tablet (10 mg total) by mouth once daily. 30 tablet 11    fexofenadine (ALLEGRA) 60 MG tablet Take 60 mg by mouth once daily.      fish oil-omega-3 fatty acids 300-1,000 mg capsule Take 1 g by mouth once daily.       gabapentin (NEURONTIN) 600 MG tablet Take 600 mg by mouth 3 (three) times daily.      isosorbide mononitrate (IMDUR) 60 MG 24 hr tablet Take 1 tablet (60 mg total) by mouth once daily. 30 tablet 0    LORazepam (ATIVAN) 0.5 MG tablet Take 1 tablet (0.5 mg total) by mouth every morning. 30 tablet 2    losartan (COZAAR) 25 MG tablet Take 1 tablet (25 mg total) by mouth once daily. 30 tablet 0    metFORMIN (GLUCOPHAGE) 500 MG tablet Take 1 tablet (500 mg total) by mouth 2 (two) times daily. 60 tablet 0    montelukast (SINGULAIR) 10 mg tablet Take 10 mg by mouth once daily.      mv-mn/folic ac/calcium/vit K1 (WOMEN'S 50 PLUS MULTIVITAMIN ORAL) Take 1 tablet by mouth once daily.      nitroGLYCERIN (NITROSTAT) 0.4 MG SL tablet Place 1 tablet (0.4 mg total) under the tongue every 5 (five) minutes as needed for Chest pain. 25 tablet PRN    oxybutynin (DITROPAN-XL) 5 MG TR24 Take 1 tablet (5 mg total) by mouth every other day. 15 tablet 0    pantoprazole (PROTONIX) 40 MG tablet Take 1 tablet (40 mg total) by mouth once daily. 90 tablet 3    phenazopyridine HCl (PYRIDIUM ORAL) Take 1 tablet by mouth 3 (three) times daily as needed.      POLYSORBATE 80/GLYCERIN (REFRESH DRY EYE THERAPY OPHT) Apply to eye 2 (two) times daily.      potassium gluconate 550 mg (90 mg) Tab Take 180 mg by mouth 2 (two) times a day.      ranolazine (RANEXA) 1,000 mg Tb12 Take 1 tablet (1,000 mg total) by mouth 2 (two) times daily. 180 tablet 3    SYMBICORT 160-4.5 mcg/actuation HFAA Inhale 1 puff into the lungs 2 (two) times  daily.      vitamin D (VITAMIN D3) 1000 units Tab Take 2,000 Units by mouth once daily.      ZINC ORAL Take 1 tablet by mouth once daily.       Current Facility-Administered Medications on File Prior to Visit   Medication Dose Route Frequency Provider Last Rate Last Admin    0.9%  NaCl infusion   Intravenous Continuous Lynette Hightower, NP   New Bag at 02/10/20 1029    sodium chloride 0.9% flush 5 mL  5 mL Intravenous PRN Lynette Hightower NP           Review of patient's allergies indicates:   Allergen Reactions    Celebrex [celecoxib] Shortness Of Breath    Ciprofloxacin Swelling     lip    Dicyclomine Hives    Adhesive Dermatitis    Avelox [moxifloxacin] Swelling    Cilostazol Other (See Comments)     Elevates blood pressure    Eryc [erythromycin] Other (See Comments)     unknown    Iodine and iodide containing products Hives    Keflex [cephalexin] Hives    Meclomen      rashes    Meloxicam      Ear ringing    Metoclopramide Other (See Comments)     High blood pressure    Sulfa (sulfonamide antibiotics) Itching     Objective:   There were no vitals filed for this visit.     Physical Exam     Assessment:     1. Cardiac pacemaker in situ      Plan:   Diagnoses and all orders for this visit:    Cardiac pacemaker in situ         No follow-ups on file.

## 2023-10-20 ENCOUNTER — TELEPHONE (OUTPATIENT)
Dept: CARDIOLOGY | Facility: CLINIC | Age: 78
End: 2023-10-20
Payer: MEDICARE

## 2023-10-25 ENCOUNTER — TELEPHONE (OUTPATIENT)
Dept: CARDIOLOGY | Facility: HOSPITAL | Age: 78
End: 2023-10-25
Payer: MEDICARE

## 2023-10-26 NOTE — PROGRESS NOTES
[facility] Provider Note (Follow-up) Consult Note      HPI:     Patient reports improvement in SOB. Now wearing 2L supplemental O2 prn with exertion. SpO2 90% on RA at rest in clinic. Can ambulate within home and pace slow. Completed HH PT/OT. Presents to clinic in wheelchair.              Patient sleeps on 2 number of pillows with wedge pillow. Unable to lay flat at baseline              Patient wakes up SOB, has to get out of bed, associated cough- denies              Palpitations - intermittent (h/o AF)              Dizzy, light-headed, pre-syncope or syncope- denies              Since discharge frequency of performing weights, home weight and weight change- weight on home scale stable 236-237lbs              Other information felt pertinent to HPI: Ms. Adriana Strong is a 77 yo female with a PMHx of COPD, HFpEF, CAD, persistent AF s/p PPM on eliquis, AS s/p TAVR, HLD, HTN, DM2, mesenteric ischemia who presents to [facility] following recent admission for ADHF. Workup in ED remarkable for tachypnea/hypoxia requiring 2L NC, Hb 10.9, Na 128, Cl 89, , Trop neg, CXR with no acute findings. She received dose of lasix 40mg in ED and then diuresed with IVP lasix 40mg BID. 6MWT qualifying for home O2, arranged prior to discharge.     10/17/23: Today she reports improvement in breathing/BLE edema since last visit. Only SOB on more significant exertion, now wearing supplemental O2 prn. Started jardiance last Wednesday. Monitoring BG without episodes of hypoglycemia. Softer BP in clinic today (but home log consisently 130-120s/70s).     10/27/23: Today she has no acute complaints. Denies worsening SOB/BLE edema.      PHYSICAL:   Vitals:    10/27/23 1004   BP: (!) 107/56   Pulse: 69        Wt Readings from Last 3 Encounters:   10/27/23 108.1 kg (238 lb 5.1 oz)   10/17/23 109 kg (240 lb 3.1 oz)   10/02/23 110.5 kg (243 lb 9.6 oz)       JVD: no, difficult to assess 2/2 body habitus   Heart rhythm: regular  Cardiac murmur:  yes  S3: no  S4: yes  Lungs: clear  Hepatojugular reflux: no  Edema: trace edema     Lab Results   Component Value Date     10/27/2023    K 4.8 10/27/2023    MG 1.9 10/27/2023    CL 98 10/27/2023    CO2 29 10/27/2023    BUN 20 10/27/2023    CREATININE 0.9 10/27/2023     10/27/2023    CALCIUM 9.6 10/27/2023    AST 18 10/27/2023    ALT 16 10/27/2023    ALBUMIN 3.7 10/27/2023    PROT 7.0 10/27/2023    BILITOT 0.4 10/27/2023     Lab Results   Component Value Date     (H) 10/27/2023     (H) 10/17/2023     (H) 10/02/2023       ASSESSMENT: Chronic diastolic HF    PLAN:      Patient Instructions:   Instruct the patient to notify this clinic if HH, a physician or an advanced care provider wants to change medication one of their HF medications   Activity and Diet restrictions:   Recommend 2-3 gram sodium restriction and 1500cc- 2000cc fluid restriction.  Encourage physical activity with graded exercise program.  Requested patient to weigh themselves daily, and to notify us if their weight increases by more than 3 lbs in 1 day or 5 lbs in 3 days.    Assigned dry weight on home scale: 236-237lbs  Medication changes (include current dose and changed dose): NYHA Class III symptoms. Well compensated on exam. Renal function returned to baseline. Continue current GDMT. G3DD (IVS 1.11cm) with h/o AF, TAVR, lumbar stenosis, neuropathy. Amyloid labs ordered. Once AL amyloid r/o, plan for PYP.    Upcoming labs and date anticipated: tentative phone dc pending amyloid workup     Khloe Andujar PA-C

## 2023-10-27 ENCOUNTER — OFFICE VISIT (OUTPATIENT)
Dept: CARDIOLOGY | Facility: CLINIC | Age: 78
End: 2023-10-27
Payer: MEDICARE

## 2023-10-27 ENCOUNTER — HOSPITAL ENCOUNTER (OUTPATIENT)
Dept: CARDIOLOGY | Facility: HOSPITAL | Age: 78
Discharge: HOME | End: 2023-10-27
Attending: INTERNAL MEDICINE
Payer: MEDICARE

## 2023-10-27 VITALS
HEIGHT: 64 IN | BODY MASS INDEX: 40.63 KG/M2 | WEIGHT: 238 LBS | HEART RATE: 71 BPM | SYSTOLIC BLOOD PRESSURE: 137 MMHG | DIASTOLIC BLOOD PRESSURE: 74 MMHG

## 2023-10-27 VITALS
HEART RATE: 69 BPM | DIASTOLIC BLOOD PRESSURE: 56 MMHG | HEIGHT: 64 IN | BODY MASS INDEX: 40.69 KG/M2 | OXYGEN SATURATION: 90 % | WEIGHT: 238.31 LBS | SYSTOLIC BLOOD PRESSURE: 107 MMHG

## 2023-10-27 DIAGNOSIS — I10 ESSENTIAL HYPERTENSION: ICD-10-CM

## 2023-10-27 DIAGNOSIS — I25.111 CORONARY ARTERY DISEASE INVOLVING NATIVE CORONARY ARTERY OF NATIVE HEART WITH ANGINA PECTORIS WITH DOCUMENTED SPASM: ICD-10-CM

## 2023-10-27 DIAGNOSIS — I48.19 PERSISTENT ATRIAL FIBRILLATION: ICD-10-CM

## 2023-10-27 DIAGNOSIS — J42 CHRONIC BRONCHITIS, UNSPECIFIED CHRONIC BRONCHITIS TYPE: ICD-10-CM

## 2023-10-27 DIAGNOSIS — I25.10 CORONARY ARTERY DISEASE INVOLVING NATIVE CORONARY ARTERY OF NATIVE HEART WITHOUT ANGINA PECTORIS: ICD-10-CM

## 2023-10-27 DIAGNOSIS — Z95.2 S/P TAVR (TRANSCATHETER AORTIC VALVE REPLACEMENT): ICD-10-CM

## 2023-10-27 DIAGNOSIS — I35.0 AORTIC VALVE STENOSIS, ETIOLOGY OF CARDIAC VALVE DISEASE UNSPECIFIED: ICD-10-CM

## 2023-10-27 DIAGNOSIS — I50.32 CHRONIC DIASTOLIC HEART FAILURE: Primary | ICD-10-CM

## 2023-10-27 DIAGNOSIS — E78.5 HYPERLIPIDEMIA, UNSPECIFIED HYPERLIPIDEMIA TYPE: ICD-10-CM

## 2023-10-27 DIAGNOSIS — Z95.0 CARDIAC PACEMAKER IN SITU: ICD-10-CM

## 2023-10-27 LAB
CFR FLOW - ANTERIOR: 1.44
CFR FLOW - INFERIOR: 1.49
CFR FLOW - LATERAL: 1.52
CFR FLOW - MAX: 1.74
CFR FLOW - MIN: 0.9
CFR FLOW - SEPTAL: 1.26
CFR FLOW - WHOLE HEART: 1.43
CFR FLOW- DEFECT 1: 1.09
CV PHARM DOSE: 0.4 MG
CV STRESS BASE HR: 71 BPM
DIASTOLIC BLOOD PRESSURE: 74 MMHG
EJECTION FRACTION- HIGH: 59 %
END DIASTOLIC INDEX-HIGH: 155 ML/M2
END DIASTOLIC INDEX-LOW: 91 ML/M2
END SYSTOLIC INDEX-HIGH: 78 ML/M2
END SYSTOLIC INDEX-LOW: 40 ML/M2
NUC REST DIASTOLIC VOLUME INDEX: 76
NUC REST EJECTION FRACTION: 67
NUC REST SYSTOLIC VOLUME INDEX: 25
NUC STRESS DIASTOLIC VOLUME INDEX: 77
NUC STRESS EJECTION FRACTION: 67 %
NUC STRESS SYSTOLIC VOLUME INDEX: 25
OHS CV CPX 85 PERCENT MAX PREDICTED HEART RATE MALE: 119
OHS CV CPX MAX PREDICTED HEART RATE: 140
OHS CV CPX PATIENT IS FEMALE: 1
OHS CV CPX PATIENT IS MALE: 0
OHS CV CPX PEAK DIASTOLIC BLOOD PRESSURE: 41 MMHG
OHS CV CPX PEAK HEAR RATE: 69 BPM
OHS CV CPX PEAK RATE PRESSURE PRODUCT: 8142
OHS CV CPX PEAK SYSTOLIC BLOOD PRESSURE: 118 MMHG
OHS CV CPX PERCENT MAX PREDICTED HEART RATE ACHIEVED: 51
OHS CV CPX RATE PRESSURE PRODUCT PRESENTING: 9727
PERFUSION DEFECT 1 SIZE IN %: 3 %
REST FLOW - ANTERIOR: 0.66 CC/MIN/G
REST FLOW - INFERIOR: 0.66 CC/MIN/G
REST FLOW - LATERAL: 0.54 CC/MIN/G
REST FLOW - MAX: 0.08 CC/MIN/G
REST FLOW - MIN: 0.27 CC/MIN/G
REST FLOW - SEPTAL: 0.49 CC/MIN/G
REST FLOW - WHOLE HEART: 0.59 CC/MIN/G
REST FLOW- DEFECT 1: 0.44 CC/MIN/G
RETIRED EF AND QEF - SEE NOTES: 47 %
STRESS FLOW - ANTERIOR: 0.95 CC/MIN/G
STRESS FLOW - INFERIOR: 0.97 CC/MIN/G
STRESS FLOW - LATERAL: 0.82 CC/MIN/G
STRESS FLOW - MAX: 1.27 CC/MIN/G
STRESS FLOW - MIN: 0.39 CC/MIN/G
STRESS FLOW - SEPTAL: 0.62 CC/MIN/G
STRESS FLOW - WHOLE HEART: 0.84 CC/MIN/G
STRESS FLOW- DEFECT 1: 0.47 CC/MIN/G
SYSTOLIC BLOOD PRESSURE: 137 MMHG

## 2023-10-27 PROCEDURE — 63600175 PHARM REV CODE 636 W HCPCS: Performed by: INTERNAL MEDICINE

## 2023-10-27 PROCEDURE — 3078F DIAST BP <80 MM HG: CPT | Mod: CPTII,S$GLB,,

## 2023-10-27 PROCEDURE — 78431 MYOCRD IMG PET RST&STRS CT: CPT

## 2023-10-27 PROCEDURE — 1157F ADVNC CARE PLAN IN RCRD: CPT | Mod: CPTII,S$GLB,,

## 2023-10-27 PROCEDURE — 3078F PR MOST RECENT DIASTOLIC BLOOD PRESSURE < 80 MM HG: ICD-10-PCS | Mod: CPTII,S$GLB,,

## 2023-10-27 PROCEDURE — 78434 AQMBF PET REST & RX STRESS: CPT | Mod: 26,,, | Performed by: INTERNAL MEDICINE

## 2023-10-27 PROCEDURE — 93016 CV STRESS TEST SUPVJ ONLY: CPT | Mod: ,,, | Performed by: INTERNAL MEDICINE

## 2023-10-27 PROCEDURE — 99999 PR PBB SHADOW E&M-EST. PATIENT-LVL V: CPT | Mod: PBBFAC,,,

## 2023-10-27 PROCEDURE — 1126F AMNT PAIN NOTED NONE PRSNT: CPT | Mod: CPTII,S$GLB,,

## 2023-10-27 PROCEDURE — 78431 MYOCRD IMG PET RST&STRS CT: CPT | Mod: 26,,, | Performed by: INTERNAL MEDICINE

## 2023-10-27 PROCEDURE — 78434 CARDIAC PET SCAN STRESS (CUPID ONLY): ICD-10-PCS | Mod: 26,,, | Performed by: INTERNAL MEDICINE

## 2023-10-27 PROCEDURE — 1157F PR ADVANCE CARE PLAN OR EQUIV PRESENT IN MEDICAL RECORD: ICD-10-PCS | Mod: CPTII,S$GLB,,

## 2023-10-27 PROCEDURE — 93016 CARDIAC PET SCAN STRESS (CUPID ONLY): ICD-10-PCS | Mod: ,,, | Performed by: INTERNAL MEDICINE

## 2023-10-27 PROCEDURE — 3074F SYST BP LT 130 MM HG: CPT | Mod: CPTII,S$GLB,,

## 2023-10-27 PROCEDURE — 1159F MED LIST DOCD IN RCRD: CPT | Mod: CPTII,S$GLB,,

## 2023-10-27 PROCEDURE — 99214 PR OFFICE/OUTPT VISIT, EST, LEVL IV, 30-39 MIN: ICD-10-PCS | Mod: S$GLB,,,

## 2023-10-27 PROCEDURE — 99999 PR PBB SHADOW E&M-EST. PATIENT-LVL V: ICD-10-PCS | Mod: PBBFAC,,,

## 2023-10-27 PROCEDURE — 3288F FALL RISK ASSESSMENT DOCD: CPT | Mod: CPTII,S$GLB,,

## 2023-10-27 PROCEDURE — 1101F PT FALLS ASSESS-DOCD LE1/YR: CPT | Mod: CPTII,S$GLB,,

## 2023-10-27 PROCEDURE — 78431 CARDIAC PET SCAN STRESS (CUPID ONLY): ICD-10-PCS | Mod: 26,,, | Performed by: INTERNAL MEDICINE

## 2023-10-27 PROCEDURE — 78434 AQMBF PET REST & RX STRESS: CPT

## 2023-10-27 PROCEDURE — 1101F PR PT FALLS ASSESS DOC 0-1 FALLS W/OUT INJ PAST YR: ICD-10-PCS | Mod: CPTII,S$GLB,,

## 2023-10-27 PROCEDURE — 3074F PR MOST RECENT SYSTOLIC BLOOD PRESSURE < 130 MM HG: ICD-10-PCS | Mod: CPTII,S$GLB,,

## 2023-10-27 PROCEDURE — 1160F PR REVIEW ALL MEDS BY PRESCRIBER/CLIN PHARMACIST DOCUMENTED: ICD-10-PCS | Mod: CPTII,S$GLB,,

## 2023-10-27 PROCEDURE — 1159F PR MEDICATION LIST DOCUMENTED IN MEDICAL RECORD: ICD-10-PCS | Mod: CPTII,S$GLB,,

## 2023-10-27 PROCEDURE — 1126F PR PAIN SEVERITY QUANTIFIED, NO PAIN PRESENT: ICD-10-PCS | Mod: CPTII,S$GLB,,

## 2023-10-27 PROCEDURE — 3288F PR FALLS RISK ASSESSMENT DOCUMENTED: ICD-10-PCS | Mod: CPTII,S$GLB,,

## 2023-10-27 PROCEDURE — 1160F RVW MEDS BY RX/DR IN RCRD: CPT | Mod: CPTII,S$GLB,,

## 2023-10-27 PROCEDURE — 99214 OFFICE O/P EST MOD 30 MIN: CPT | Mod: S$GLB,,,

## 2023-10-27 PROCEDURE — 93018 CV STRESS TEST I&R ONLY: CPT | Mod: ,,, | Performed by: INTERNAL MEDICINE

## 2023-10-27 PROCEDURE — 93018 CARDIAC PET SCAN STRESS (CUPID ONLY): ICD-10-PCS | Mod: ,,, | Performed by: INTERNAL MEDICINE

## 2023-10-27 RX ORDER — REGADENOSON 0.08 MG/ML
0.4 INJECTION, SOLUTION INTRAVENOUS
Status: COMPLETED | OUTPATIENT
Start: 2023-10-27 | End: 2023-10-27

## 2023-10-27 RX ORDER — FUROSEMIDE 40 MG/1
TABLET ORAL
Qty: 60 TABLET | Refills: 11 | Status: SHIPPED | OUTPATIENT
Start: 2023-10-27 | End: 2024-02-29

## 2023-10-27 RX ORDER — AMINOPHYLLINE 25 MG/ML
75 INJECTION, SOLUTION INTRAVENOUS ONCE
Status: COMPLETED | OUTPATIENT
Start: 2023-10-27 | End: 2023-10-27

## 2023-10-27 RX ADMIN — AMINOPHYLLINE 75 MG: 25 INJECTION, SOLUTION INTRAVENOUS at 11:10

## 2023-10-27 RX ADMIN — REGADENOSON 0.4 MG: 0.08 INJECTION, SOLUTION INTRAVENOUS at 11:10

## 2023-10-31 ENCOUNTER — TELEPHONE (OUTPATIENT)
Dept: CARDIOLOGY | Facility: CLINIC | Age: 78
End: 2023-10-31
Payer: MEDICARE

## 2023-10-31 NOTE — TELEPHONE ENCOUNTER
"[facility]) weekly phone follow up / triage call completed.     TCC RN Navigator spoke with  PT and Tammy Kemp    Current Patient reported weight:  235.2 lbs   Patient Goal Weight: (236-237 lbs)  Recent Patient reported blood pressure and heart rate:  /67 Doesn't remember pulse    Pt reports the following:  []  Shortness of Breath with Activity  []  Shortness of Breath at rest   []  Fatigue  [x]  Edema Slight amt to legs, a tad on right and greater on the left  [] Chest pain or tightness  [] Weight Increase since discharge  [] None of the above    Pt reports using "Daily weight and symptom tracker".    Pt reports being in the GREEN(color) Zone. If in yellow/red, reminded that they should be calling [facility] today or now.     Medications:   Medication compliance reviewed with pt.  Pt reports having medication list available and has all medications at home for use per list.  PT states taking meds as prescribed       Education:   Confirmed pt still has "[facility] Home Care Guide"  .     Reminded of key points as listed below.     Recommend 2 -3 gram sodium restriction and 1500 cc-2000 cc fluid restriction.  Encourage physical activity with graded exercise program.  Requested patient to weigh themselves daily, and to notify us if their weight increases by more than 3 lbs in 1 day or 5 lbs in 3 days.   Reminded to use "Daily weight and symptom tracker".  Even if pt does not have a scale, to use symptom tracker.       Watch for these Signs and Symptoms: If any of these occur, contact [facility] immediately:   Increase in shortness of breath with movement   Increase in swelling in your legs and ankles   Weight gain of more than 3 pounds in a day or 5 pounds in 3 days.   Difficulty breathing when you are lying down   Worsening fatigue or tiredness   Stomach bloating, a full feeling or a loss of appetite   Increased coughing--especially when you are lying " "down      Pt was able to verbalize back to RN in their own words correct diet/fluid restrictions, necessity for exercise, warning signs and symptoms, when and how to contact their [facility] team.      Pt reminded of upcoming appointment.  PT reports they will attend. Next Weekly [facility] call on 11-7-2023. Results from amyloid labs to be given by SILVIA Andujar      Pt reminded of how and when to contact [facility]:  [phone] (Mon-Fri, 8a-5p) & for urgent issues on the weekend to page the Heart Transplant MD on call.  Pt also encouraged utilize [facility] messaging as well.      Pt  verbalized understanding and in agreement of plan.   PT states "you mean call you if there is a zone change like yellow" I tell her exactly!      Will follow up with pt at next clinic visit and RN navigator available for pt questions, issues or concerns.   "

## 2023-11-07 ENCOUNTER — TELEPHONE (OUTPATIENT)
Dept: CARDIOLOGY | Facility: CLINIC | Age: 78
End: 2023-11-07
Payer: MEDICARE

## 2023-11-07 NOTE — TELEPHONE ENCOUNTER
"[facility]) weekly phone follow up / triage call completed.     TCC RN Navigator spoke with pt, Ms Wright    Current Patient reported weight: 231.4 lbs   Patient Goal Weight: ( lbs)  Recent Patient reported blood pressure and heart rate:  BP:129/71 HR 69    Pt reports the following:  []  Shortness of Breath with Activity  []  Shortness of Breath at rest   []  Fatigue  []  Edema   [] Chest pain or tightness  [] Weight Increase since discharge  [x] None of the above  Pt reports decreased need for O2, uses compressor PRN.     Pt reports using "Daily weight and symptom tracker".    Pt reports being in the GREEN  Zone. If in yellow/red, reminded that they should be calling [facility] today or now.     Medications:   Medication compliance reviewed with pt.  Pt reports having medication list available and has all medications at home for use per list.   Pt denies need/ questions r/t medications at this time.       Education:   Confirmed pt still has "[facility] Home Care Guide"  .     Reminded of key points as listed below.     Recommend 2 -3 gram sodium restriction and 1500 cc-2000 cc fluid restriction.  Encourage physical activity with graded exercise program.  Requested patient to weigh themselves daily, and to notify us if their weight increases by more than 3 lbs in 1 day or 5 lbs in 3 days.   Reminded to use "Daily weight and symptom tracker".  Even if pt does not have a scale, to use symptom tracker.       Watch for these Signs and Symptoms: If any of these occur, contact [facility] immediately:   Increase in shortness of breath with movement   Increase in swelling in your legs and ankles   Weight gain of more than 3 pounds in a day or 5 pounds in 3 days.   Difficulty breathing when you are lying down   Worsening fatigue or tiredness   Stomach bloating, a full feeling or a loss of appetite   Increased coughing--especially when you are lying down      Pt " was able to verbalize back to RN in their own words correct diet/fluid restrictions, necessity for exercise, warning signs and symptoms, when and how to contact their [facility] team.      Pt reminded of upcoming appointment.  PT reports they will attend. [facility] will contact pt in one week with PYP results for r/o amyloid heart condition.        Pt reminded of how and when to contact [facility]:  [phone] (Mon-Fri, 8a-5p) & for urgent issues on the weekend to page the Heart Transplant MD on call.  Pt also encouraged utilize [facility] messaging as well.      Pt verbalized understanding and in agreement of plan.       Will follow up with pt at next clinic visit and RN navigator available for pt questions, issues or concerns.

## 2023-11-15 DIAGNOSIS — I50.32 CHRONIC DIASTOLIC HEART FAILURE: ICD-10-CM

## 2023-11-15 DIAGNOSIS — R76.8 ELEVATED SERUM IMMUNOGLOBULIN FREE LIGHT CHAINS: Primary | ICD-10-CM

## 2023-11-16 ENCOUNTER — TELEPHONE (OUTPATIENT)
Dept: HEMATOLOGY/ONCOLOGY | Facility: CLINIC | Age: 78
End: 2023-11-16
Payer: MEDICARE

## 2023-11-16 NOTE — TELEPHONE ENCOUNTER
----- Message from Jovanny Rosales RN sent at 11/16/2023  8:07 AM CST -----  Regarding: elevated free light chains    ----- Message -----  From: Khloe Andujar PA-C  Sent: 11/15/2023   3:37 PM CST  To: #  Subject: Appointment                                      Hi team,    Would you all mind getting patient scheduled for soonest available appointment for elevated free light chains?    ThanksKhloe

## 2023-12-06 ENCOUNTER — TELEPHONE (OUTPATIENT)
Dept: CARDIOLOGY | Facility: HOSPITAL | Age: 78
End: 2023-12-06
Payer: MEDICARE

## 2023-12-08 ENCOUNTER — HOSPITAL ENCOUNTER (OUTPATIENT)
Dept: CARDIOLOGY | Facility: HOSPITAL | Age: 78
Discharge: HOME | End: 2023-12-08
Payer: MEDICARE

## 2023-12-08 DIAGNOSIS — I50.32 CHRONIC DIASTOLIC HEART FAILURE: ICD-10-CM

## 2023-12-08 PROCEDURE — 78803 CV PYP ATTR W SPECT (CUPID ONLY): ICD-10-PCS | Mod: 26,,, | Performed by: INTERNAL MEDICINE

## 2023-12-08 PROCEDURE — 78803 RP LOCLZJ TUM SPECT 1 AREA: CPT

## 2023-12-08 PROCEDURE — 78803 RP LOCLZJ TUM SPECT 1 AREA: CPT | Mod: 26,,, | Performed by: INTERNAL MEDICINE

## 2023-12-11 ENCOUNTER — TELEPHONE (OUTPATIENT)
Dept: CARDIOLOGY | Facility: CLINIC | Age: 78
End: 2023-12-11
Payer: MEDICARE

## 2023-12-11 NOTE — TELEPHONE ENCOUNTER
Call to PT to give the following Lab test results.    Can we call and let her know her PYP scan was normal and did not show evidence of amyloid. Pt asks if the test results are on the portal as well. I tell her they should be.

## 2023-12-11 NOTE — ADDENDUM NOTE
Addended by: SINCERE LYLE on: 1/24/2019 02:06 PM     Modules accepted: Orders    
Medical Assessment Completed on: 11-Dec-2023 02:14

## 2023-12-16 NOTE — PROGRESS NOTES
Chart has been dictated using voice recognition software.  It is not been reviewed carefully for any transcriptional errors due to this technology.   Subjective:   Patient ID:  Adriana tSrong is a 78 y.o. female who presents for follow-up of Follow-up      HPI: Patient with coronary artery disease, hyperlipidemia, hypertension, aortic stenosis status post TAVR, diabetes, has CVA, heart failure, atrial fibrillation, and a permanent pacemaker. function. She has seen Dr. Mariee.  The catheterization prior to her TAVR showed a tubular 70% lLAD lesion with majority of the left system perfused off of the LAD diagonal artery.  The patient also has an LIZ stents secondary to chronic mesenteric ischemia which is being followed actively by vascular surgery.       Patient was hospitalized [id number] for respiratory failure and/or exacerbation of heart failure.  An echocardiogram during hospitalization showed her ejection fraction stable at 55% with normal prosthetic aortic valve and CVP of 8 mm Hg.  Patient was diuresed and treated for COPD.  She was discharged home on oxygen therapy has been followed in the heart failure TCC.  She was evaluated for amyloidosis by the heart failure TCC and had a negative pyrophosphate scan for ATTR amyloidosis, normal electrophoresis, although there were mildly elevated free light chains not suggestive of an AL amyloidosis.    Has significant dyspnea with exertion.  Stopped using )2 on her own because resting )2 sat was normal.  Occasional palpitations. No syncope.  No PND. Has edema. Sleeps at 45+ degrees in bed      Outpatient Medications Prior to Visit   Medication Sig Dispense Refill    acetaminophen (TYLENOL) 650 MG TbSR Take 1,300 mg by mouth 2 (two) times daily as needed.      albuterol-ipratropium (DUO-NEB) 2.5 mg-0.5 mg/3 mL nebulizer solution Take 3 mLs by nebulization every 4 (four) hours as needed for Wheezing or Shortness of Breath.      apixaban (ELIQUIS) 5 mg Tab  TAKE 1 TABLET BY MOUTH TWICE A  tablet 3    ascorbic acid, vitamin C, (VITAMIN C) 500 MG tablet Take 1,000 mg by mouth once daily.       atorvastatin (LIPITOR) 10 MG tablet Take 1 tablet (10 mg total) by mouth once daily. 30 tablet 0    calcium carbonate/vitamin D3 (CALTRATE 600 + D ORAL) Take 1 tablet by mouth once daily.      carvediloL (COREG) 12.5 MG tablet Take 1 tablet (12.5 mg total) by mouth 2 (two) times daily. 180 tablet 3    clopidogreL (PLAVIX) 75 mg tablet TAKE 1 TABLET BY MOUTH EVERY DAY 90 tablet 3    diltiaZEM (CARDIZEM CD) 240 MG 24 hr capsule TAKE 1 CAPSULE BY MOUTH ONCE A DAY 90 capsule 3    DULoxetine (CYMBALTA) 20 MG capsule Take 1 capsule (20 mg total) by mouth once daily. 30 capsule 0    empagliflozin (JARDIANCE) 10 mg tablet Take 1 tablet (10 mg total) by mouth once daily. 30 tablet 11    fexofenadine (ALLEGRA) 60 MG tablet Take 60 mg by mouth once daily.      fish oil-omega-3 fatty acids 300-1,000 mg capsule Take 1 g by mouth once daily.       furosemide (LASIX) 40 MG tablet Take 40mg (1 tablet) in the morning and 20mg (1/2 tablet) in the afternoon 60 tablet 11    gabapentin (NEURONTIN) 600 MG tablet Take 600 mg by mouth 3 (three) times daily.      isosorbide mononitrate (IMDUR) 60 MG 24 hr tablet Take 1 tablet (60 mg total) by mouth once daily. 30 tablet 0    LORazepam (ATIVAN) 0.5 MG tablet Take 1 tablet (0.5 mg total) by mouth every morning. 30 tablet 2    metFORMIN (GLUCOPHAGE) 500 MG tablet Take 1 tablet (500 mg total) by mouth 2 (two) times daily. 60 tablet 0    montelukast (SINGULAIR) 10 mg tablet Take 10 mg by mouth once daily.      mv-mn/folic ac/calcium/vit K1 (WOMEN'S 50 PLUS MULTIVITAMIN ORAL) Take 1 tablet by mouth once daily.      nitroGLYCERIN (NITROSTAT) 0.4 MG SL tablet Place 1 tablet (0.4 mg total) under the tongue every 5 (five) minutes as needed for Chest pain. 25 tablet PRN    nystatin-triamcinolone (MYCOLOG) ointment Apply topically 3 (three) times daily.       oxybutynin (DITROPAN-XL) 5 MG TR24 Take 1 tablet (5 mg total) by mouth every other day. 15 tablet 0    pantoprazole (PROTONIX) 40 MG tablet Take 1 tablet (40 mg total) by mouth once daily. 90 tablet 3    phenazopyridine HCl (PYRIDIUM ORAL) Take 1 tablet by mouth 3 (three) times daily as needed.      POLYSORBATE 80/GLYCERIN (REFRESH DRY EYE THERAPY OPHT) Apply to eye 2 (two) times daily.      potassium gluconate 550 mg (90 mg) Tab Take 180 mg by mouth 2 (two) times a day.      ranolazine (RANEXA) 1,000 mg Tb12 Take 1 tablet (1,000 mg total) by mouth 2 (two) times daily. 180 tablet 3    SYMBICORT 160-4.5 mcg/actuation HFAA Inhale 1 puff into the lungs 2 (two) times daily.      vitamin D (VITAMIN D3) 1000 units Tab Take 2,000 Units by mouth once daily.      ZINC ORAL Take 1 tablet by mouth once daily.      losartan (COZAAR) 25 MG tablet Take 1 tablet (25 mg total) by mouth once daily. 30 tablet 0     Facility-Administered Medications Prior to Visit   Medication Dose Route Frequency Provider Last Rate Last Admin    0.9%  NaCl infusion   Intravenous Continuous Lynette Hightower NP   New Bag at 02/10/20 1029    sodium chloride 0.9% flush 5 mL  5 mL Intravenous PRN Lynette Hightower NP           Review of Systems   HENT:  Negative for nosebleeds.    Respiratory:  Negative for hemoptysis.    Hematologic/Lymphatic: Does not bruise/bleed easily.   Gastrointestinal:  Negative for hematochezia and hemorrhoids.      Objective:   Physical Exam  Constitutional:       Appearance: She is well-developed. She is obese.      Comments: BP (!) 113/53   Pulse 69   Wt 103 kg (227 lb)   LMP 01/21/1973 (LMP Unknown)   SpO2 (!) 93%   BMI 38.96 kg/m²      Neck:      Vascular: No carotid bruit or JVD.   Cardiovascular:      Rate and Rhythm: Normal rate and regular rhythm.      Pulses: Intact distal pulses.      Heart sounds: Normal heart sounds. No murmur heard.     No friction rub. No gallop.   Pulmonary:      Effort: Pulmonary  effort is normal.      Breath sounds: Normal breath sounds. No wheezing or rales.   Abdominal:      General: Bowel sounds are normal. There is no abdominal bruit.      Palpations: Abdomen is soft. There is no hepatomegaly.      Tenderness: There is no abdominal tenderness.   Musculoskeletal:      Cervical back: Neck supple.      Right lower leg: Edema (1+ pretibial) present.      Left lower leg: Edema (1+ pretibial) present.   Skin:     Nails: There is no clubbing.   Neurological:      Mental Status: She is alert and oriented to person, place, and time.           Lab Results   Component Value Date     10/27/2023    K 4.8 10/27/2023    BUN 20 10/27/2023    CREATININE 0.9 10/27/2023    EGFRNORACEVR >60.0 10/27/2023     10/27/2023    HGBA1C 5.3 09/21/2023    CHOL 149 03/22/2023    TRIG 118 03/22/2023    HDL 63 03/22/2023    LDLCALC 62.4 (L) 03/22/2023    HGB 11.6 (L) 10/02/2023    HCT 37.4 10/02/2023    HCT 42 11/26/2019    WBC 7.60 10/02/2023     10/02/2023    INR 1.2 09/30/2022       Assessment:     1. Coronary artery disease involving native coronary artery of native heart with angina pectoris with documented spasm    2. S/P TAVR (transcatheter aortic valve replacement)    3. Essential hypertension    4. Old lacunar stroke without late effect    5. Type 2 diabetes mellitus without complication, without long-term current use of insulin    6. Chronic mesenteric ischemia    7. Hyperlipidemia, unspecified hyperlipidemia type    8. Persistent atrial fibrillation    9. Obesity, unspecified classification, unspecified obesity type, unspecified whether serious comorbidity present    10. Aortic atherosclerosis    11. Chronic bronchitis, unspecified chronic bronchitis type    12. Cardiac pacemaker in situ    13. Acute on chronic heart failure with preserved ejection fraction    14. Chronic heart failure with preserved ejection fraction      Patient appears to be relatively stable.  She has significant  exertional dyspnea which may be due to her heart failure.  Patient had a normal aortic exam by physical examination with normal carotid pulse upstroke, normal S2, and no evidence of a murmur.  Therefore, it is most likely that her prosthetic aortic valve is working normally.  The patient has stopped using oxygen on her own.  It is likely that she needs oxygen when she is moving.  However, walking down the hallway in clinic, the patient became short of breath without a change in her O2 saturation.  However, her O2 saturation started out lower than she normally says it is at home.  Therefore, a 6 minute walk test will be ordered.      Patient was also instructed to stop using fish oil as it is not affecting her cholesterol profile and may be adding to her increased bleeding tendency from the antithrombotic therapy she is on.  However, it is unlikely that stopping fish oil will affect her cholesterol profile, as she is on a low-dose fish oil.      Cerebrovascular system appears to be stable.  Her blood pressure is elevated at home as can be seen in the photo put into the media section today.  Therefore, losartan will be increased to 50 mg q.d..  Her pacemaker is being followed by cardiac electrophysiology as is her atrial fibrillation.    Unless there are intervening problems, patient should return for re-evaluation in 2 months.   Plan:     Adriana was seen today for follow-up.    Diagnoses and all orders for this visit:    Coronary artery disease involving native coronary artery of native heart with angina pectoris with documented spasm    S/P TAVR (transcatheter aortic valve replacement)    Essential hypertension    Old lacunar stroke without late effect    Type 2 diabetes mellitus without complication, without long-term current use of insulin    Chronic mesenteric ischemia    Hyperlipidemia, unspecified hyperlipidemia type    Persistent atrial fibrillation    Obesity, unspecified classification, unspecified obesity  type, unspecified whether serious comorbidity present    Aortic atherosclerosis    Chronic bronchitis, unspecified chronic bronchitis type    Cardiac pacemaker in situ    Acute on chronic heart failure with preserved ejection fraction  -     Ambulatory referral/consult to Cardiology  -     Six Minute Walk Test to qualify for Home Oxygen; Future    Chronic heart failure with preserved ejection fraction    Other orders  -     nystatin-triamcinolone (MYCOLOG) ointment; Apply topically 3 (three) times daily.  -     losartan (COZAAR) 50 MG Tab; Take 1 tablet (50 mg total) by mouth once daily.          Nate Landis MD  Consultative Cardiology

## 2023-12-18 ENCOUNTER — OFFICE VISIT (OUTPATIENT)
Dept: CARDIOLOGY | Facility: CLINIC | Age: 78
End: 2023-12-18
Payer: MEDICARE

## 2023-12-18 VITALS
DIASTOLIC BLOOD PRESSURE: 53 MMHG | SYSTOLIC BLOOD PRESSURE: 113 MMHG | OXYGEN SATURATION: 93 % | WEIGHT: 227 LBS | HEART RATE: 69 BPM | BODY MASS INDEX: 38.96 KG/M2

## 2023-12-18 DIAGNOSIS — E78.5 HYPERLIPIDEMIA, UNSPECIFIED HYPERLIPIDEMIA TYPE: ICD-10-CM

## 2023-12-18 DIAGNOSIS — Z95.0 CARDIAC PACEMAKER IN SITU: ICD-10-CM

## 2023-12-18 DIAGNOSIS — I48.19 PERSISTENT ATRIAL FIBRILLATION: ICD-10-CM

## 2023-12-18 DIAGNOSIS — K55.1 CHRONIC MESENTERIC ISCHEMIA: ICD-10-CM

## 2023-12-18 DIAGNOSIS — E11.9 TYPE 2 DIABETES MELLITUS WITHOUT COMPLICATION, WITHOUT LONG-TERM CURRENT USE OF INSULIN: ICD-10-CM

## 2023-12-18 DIAGNOSIS — Z95.2 S/P TAVR (TRANSCATHETER AORTIC VALVE REPLACEMENT): ICD-10-CM

## 2023-12-18 DIAGNOSIS — I25.111 CORONARY ARTERY DISEASE INVOLVING NATIVE CORONARY ARTERY OF NATIVE HEART WITH ANGINA PECTORIS WITH DOCUMENTED SPASM: Primary | ICD-10-CM

## 2023-12-18 DIAGNOSIS — E66.9 OBESITY, UNSPECIFIED CLASSIFICATION, UNSPECIFIED OBESITY TYPE, UNSPECIFIED WHETHER SERIOUS COMORBIDITY PRESENT: ICD-10-CM

## 2023-12-18 DIAGNOSIS — I70.0 AORTIC ATHEROSCLEROSIS: ICD-10-CM

## 2023-12-18 DIAGNOSIS — Z86.73 OLD LACUNAR STROKE WITHOUT LATE EFFECT: ICD-10-CM

## 2023-12-18 DIAGNOSIS — I50.32 CHRONIC HEART FAILURE WITH PRESERVED EJECTION FRACTION: ICD-10-CM

## 2023-12-18 DIAGNOSIS — J42 CHRONIC BRONCHITIS, UNSPECIFIED CHRONIC BRONCHITIS TYPE: ICD-10-CM

## 2023-12-18 DIAGNOSIS — I10 ESSENTIAL HYPERTENSION: ICD-10-CM

## 2023-12-18 DIAGNOSIS — I50.33 ACUTE ON CHRONIC HEART FAILURE WITH PRESERVED EJECTION FRACTION: ICD-10-CM

## 2023-12-18 PROCEDURE — 99999 PR PBB SHADOW E&M-EST. PATIENT-LVL V: ICD-10-PCS | Mod: PBBFAC,,, | Performed by: INTERNAL MEDICINE

## 2023-12-18 PROCEDURE — 1160F PR REVIEW ALL MEDS BY PRESCRIBER/CLIN PHARMACIST DOCUMENTED: ICD-10-PCS | Mod: CPTII,S$GLB,, | Performed by: INTERNAL MEDICINE

## 2023-12-18 PROCEDURE — 1100F PTFALLS ASSESS-DOCD GE2>/YR: CPT | Mod: CPTII,S$GLB,, | Performed by: INTERNAL MEDICINE

## 2023-12-18 PROCEDURE — 1160F RVW MEDS BY RX/DR IN RCRD: CPT | Mod: CPTII,S$GLB,, | Performed by: INTERNAL MEDICINE

## 2023-12-18 PROCEDURE — 3078F DIAST BP <80 MM HG: CPT | Mod: CPTII,S$GLB,, | Performed by: INTERNAL MEDICINE

## 2023-12-18 PROCEDURE — 99214 OFFICE O/P EST MOD 30 MIN: CPT | Mod: S$GLB,,, | Performed by: INTERNAL MEDICINE

## 2023-12-18 PROCEDURE — 1126F AMNT PAIN NOTED NONE PRSNT: CPT | Mod: CPTII,S$GLB,, | Performed by: INTERNAL MEDICINE

## 2023-12-18 PROCEDURE — 3288F PR FALLS RISK ASSESSMENT DOCUMENTED: ICD-10-PCS | Mod: CPTII,S$GLB,, | Performed by: INTERNAL MEDICINE

## 2023-12-18 PROCEDURE — 1126F PR PAIN SEVERITY QUANTIFIED, NO PAIN PRESENT: ICD-10-PCS | Mod: CPTII,S$GLB,, | Performed by: INTERNAL MEDICINE

## 2023-12-18 PROCEDURE — 1159F MED LIST DOCD IN RCRD: CPT | Mod: CPTII,S$GLB,, | Performed by: INTERNAL MEDICINE

## 2023-12-18 PROCEDURE — 1157F ADVNC CARE PLAN IN RCRD: CPT | Mod: CPTII,S$GLB,, | Performed by: INTERNAL MEDICINE

## 2023-12-18 PROCEDURE — 3074F SYST BP LT 130 MM HG: CPT | Mod: CPTII,S$GLB,, | Performed by: INTERNAL MEDICINE

## 2023-12-18 PROCEDURE — 1100F PR PT FALLS ASSESS DOC 2+ FALLS/FALL W/INJURY/YR: ICD-10-PCS | Mod: CPTII,S$GLB,, | Performed by: INTERNAL MEDICINE

## 2023-12-18 PROCEDURE — 99999 PR PBB SHADOW E&M-EST. PATIENT-LVL V: CPT | Mod: PBBFAC,,, | Performed by: INTERNAL MEDICINE

## 2023-12-18 PROCEDURE — 1159F PR MEDICATION LIST DOCUMENTED IN MEDICAL RECORD: ICD-10-PCS | Mod: CPTII,S$GLB,, | Performed by: INTERNAL MEDICINE

## 2023-12-18 PROCEDURE — 3288F FALL RISK ASSESSMENT DOCD: CPT | Mod: CPTII,S$GLB,, | Performed by: INTERNAL MEDICINE

## 2023-12-18 PROCEDURE — 3074F PR MOST RECENT SYSTOLIC BLOOD PRESSURE < 130 MM HG: ICD-10-PCS | Mod: CPTII,S$GLB,, | Performed by: INTERNAL MEDICINE

## 2023-12-18 PROCEDURE — 99214 PR OFFICE/OUTPT VISIT, EST, LEVL IV, 30-39 MIN: ICD-10-PCS | Mod: S$GLB,,, | Performed by: INTERNAL MEDICINE

## 2023-12-18 PROCEDURE — 3078F PR MOST RECENT DIASTOLIC BLOOD PRESSURE < 80 MM HG: ICD-10-PCS | Mod: CPTII,S$GLB,, | Performed by: INTERNAL MEDICINE

## 2023-12-18 PROCEDURE — 1157F PR ADVANCE CARE PLAN OR EQUIV PRESENT IN MEDICAL RECORD: ICD-10-PCS | Mod: CPTII,S$GLB,, | Performed by: INTERNAL MEDICINE

## 2023-12-18 RX ORDER — NYSTATIN AND TRIAMCINOLONE ACETONIDE 100000; 1 [USP'U]/G; MG/G
OINTMENT TOPICAL 3 TIMES DAILY
Status: ON HOLD | COMMUNITY
End: 2024-02-11

## 2023-12-18 NOTE — Clinical Note
Thank you for allowing me to follow-up with Adriana Strong for chronic heart failure. Please see my note for details of this encounter. If you have any questions, please contact me.  Thank you again for allowing to participate in the care of this patient.

## 2023-12-20 NOTE — PHYSICIAN QUERY
PT Name: Adriana Strong  MR #: [id number]     Physician Query Form - Diagnosis Clarification    Reviewer Chey Jaramillo RN CDIS Ext [email or web address]    This form is a permanent document in the medical record.     Query Date: January 3, 2017    By submitting this query, we are merely seeking further clarification of documentation.  Please utilize your independent clinical judgment when addressing the question(s) below.     (The Medical record reflects the following:)      Findings Supporting Clinical Information Location in Medical Record   3 days ago she went to her PCP Dr. Mcgraw's office and was given azithromycin for pneumonia seen on CXR.    COPD Exacerbation vs CAP             Acute Respiratory Failure Secondary to Acute Bronchitis / Asthma -            patient with diffuse wheezing and rhonchi on exam, dry cough   - stopped home azithromycin at home  changes to vanc/leona per ED  continue for now      Procalcitonin negative.  Portable CXR without evidence of infiltrate.  Will check PA and lateral CXR and de-escalate antibiotic regimen  to doxycycline if no evidence of infiltrate    There is no evidence of acute pulmonary disease, pleural disease, lymph node enlargement, or cardiac decompensation.   H&P          PN 01/03            PN Attestation 01/03            CXR 01/03     Please clarify if the _______CAP____________________ diagnosis has been:                 [   ]   Ruled In               [   ]   Ruled In, Now Resolved               [   ]   Resolved Prior to My Assessment               [  x ]   Ruled Out               [   ]   Clinically insignificant               [   ]   Clinically undetermined               [   ]   Other/Clarification of findings:_____________________________________       Please document in your progress notes daily for the duration of treatment, until resolved, and include in your discharge summary.                                                                    Scheduled

## 2024-01-01 PROBLEM — J96.01 ACUTE HYPOXEMIC RESPIRATORY FAILURE: Status: RESOLVED | Noted: 2023-09-26 | Resolved: 2024-01-01

## 2024-01-02 ENCOUNTER — OFFICE VISIT (OUTPATIENT)
Dept: HEMATOLOGY/ONCOLOGY | Facility: CLINIC | Age: 79
End: 2024-01-02
Payer: MEDICARE

## 2024-01-02 ENCOUNTER — HOSPITAL ENCOUNTER (OUTPATIENT)
Dept: PULMONOLOGY | Facility: CLINIC | Age: 79
Discharge: HOME | End: 2024-01-02
Payer: MEDICARE

## 2024-01-02 VITALS — WEIGHT: 230.5 LBS | BODY MASS INDEX: 39.35 KG/M2 | HEIGHT: 64 IN

## 2024-01-02 VITALS — HEIGHT: 64 IN | BODY MASS INDEX: 39.27 KG/M2 | WEIGHT: 230 LBS

## 2024-01-02 DIAGNOSIS — I50.32 CHRONIC HEART FAILURE WITH PRESERVED EJECTION FRACTION: ICD-10-CM

## 2024-01-02 DIAGNOSIS — R76.8 ELEVATED SERUM IMMUNOGLOBULIN FREE LIGHT CHAINS: ICD-10-CM

## 2024-01-02 DIAGNOSIS — Z95.2 S/P TAVR (TRANSCATHETER AORTIC VALVE REPLACEMENT): Primary | ICD-10-CM

## 2024-01-02 PROCEDURE — 88342 IMHCHEM/IMCYTCHM 1ST ANTB: CPT | Performed by: PATHOLOGY

## 2024-01-02 PROCEDURE — 88313 SPECIAL STAINS GROUP 2: CPT | Performed by: PATHOLOGY

## 2024-01-02 PROCEDURE — 88305 TISSUE EXAM BY PATHOLOGIST: CPT | Performed by: PATHOLOGY

## 2024-01-02 PROCEDURE — 99205 OFFICE O/P NEW HI 60 MIN: CPT | Mod: S$GLB,,, | Performed by: INTERNAL MEDICINE

## 2024-01-02 PROCEDURE — 94618 PULMONARY STRESS TESTING: CPT | Mod: S$GLB,,, | Performed by: INTERNAL MEDICINE

## 2024-01-02 PROCEDURE — 88342 IMHCHEM/IMCYTCHM 1ST ANTB: CPT | Mod: 26,,, | Performed by: PATHOLOGY

## 2024-01-02 PROCEDURE — 88313 SPECIAL STAINS GROUP 2: CPT | Mod: 26,,, | Performed by: PATHOLOGY

## 2024-01-02 PROCEDURE — 88305 TISSUE EXAM BY PATHOLOGIST: CPT | Mod: 26,,, | Performed by: PATHOLOGY

## 2024-01-02 PROCEDURE — 88173 CYTOPATH EVAL FNA REPORT: CPT | Performed by: PATHOLOGY

## 2024-01-02 PROCEDURE — 99999 PR PBB SHADOW E&M-EST. PATIENT-LVL V: CPT | Mod: PBBFAC,,, | Performed by: INTERNAL MEDICINE

## 2024-01-02 PROCEDURE — 88173 CYTOPATH EVAL FNA REPORT: CPT | Mod: 26,,, | Performed by: PATHOLOGY

## 2024-01-02 NOTE — PROCEDURES
Adriana Strong is a 79 y.o.  female patient, who presents for a 6 minute walk test ordered by MD Sabiha.  The diagnosis is Qualify for Oxygen, COPD; Dyspnea on Exertion.  The patient's BMI is 39.5 kg/m2.  Predicted distance (lower limit of normal) is 159.29 meters.      Test Results:    The test was completed with stops. The patient stopped 2 times for a total of 154 seconds. The total time walked was 206 seconds. During walking, the patient reported:  Chest pain, Dyspnea, Lightheadedness. The patient used a walker for assistance during testing.     01/02/2024---------Distance: 76.2 meters (250 feet)     O2 Sat % Supplemental Oxygen Heart Rate Blood Pressure Baldemar Scale   Pre-exercise  (Resting) 96 % Room Air 69 bpm 162/72 mmHg 1   During Exercise 91 % Room Air 73 bpm 187/79 mmHg 4   Post-exercise  (Recovery) 96 % Room Air  69 bpm       Recovery Time: 180 seconds    Performing nurse/tech: Jim VILLANUEVA      PREVIOUS STUDY:   10/28/2019---------Distance: 110.34 meters (362 feet)         O2 Sat % Supplemental Oxygen Heart Rate Blood Pressure Baldemar Scale   Pre-exercise  (Resting) 96 % Room Air 67 bpm 130/68 mmHg 3   During Exercise 96 % Room Air 117 bpm 136/84 mmHg 4   Post-exercise  (Recovery) 96 % Room Air  80 bpm   mmHg        CLINICAL INTERPRETATION:  Six minute walk distance is 76.2 meters (250 feet) with somewhat heavy dyspnea.  During exercise, there was significant desaturation while breathing room air.  Blood pressure increased significantly and Heart rate remained stable with walking.  Hypertension was present prior to exercise.  The patient reported non-pulmonary symptoms during exercise.  Severe exercise impairment is likely due to desaturation, cardiovascular causes, and subjective symptoms.  The patient did complete the study, walking 206 seconds of the 360 second test.  Since the previous study in October 2019, exercise capacity may be somewhat worse.  Based upon age and body mass index, exercise  capacity is less than predicted.

## 2024-01-02 NOTE — PROGRESS NOTES
Subjective:       Patient ID: Adriana Strong is a 79 y.o. female.    Chief Complaint: Other (Amyloid/)    HPI    Referral from cardiology for kappa free light chain elevation and concern for amyloidosis.    Cardiovascular history of coronary artery disease, hyperlipidemia, hypertension, aortic stenosis with TAVR, diabetes, CVA, heart failure, atrial fibrillation, permanent pacemaker, and chronic mesenteric ischemia.     She was recently hospitalized September 2023 for heart failure exacerbation and respiratory failure. She was evaluated for amyloidosis by heart failure TCC with negative pyrophosphate scan for ATTR amyloidosis. She has a normal serum protein electrophoresis. Her kappa free light chain is mildly elevated at 2.88.     CBC and CMP do not have any evidence of paraprotein CRAB criteria.      Review of Systems   Constitutional: Negative.    HENT: Negative.     Eyes: Negative.    Respiratory: Negative.     Cardiovascular: Negative.    Gastrointestinal: Negative.    Endocrine: Negative.    Genitourinary: Negative.    Musculoskeletal: Negative.    Integumentary:  Negative.   Allergic/Immunologic: Negative for environmental allergies, food allergies and immunocompromised state.   Neurological: Negative.    Hematological:  Negative for adenopathy. Does not bruise/bleed easily.   Psychiatric/Behavioral: Negative.           Objective:      Physical Exam  Vitals and nursing note reviewed.   Constitutional:       Appearance: She is well-developed.   HENT:      Head: Normocephalic and atraumatic.   Eyes:      General: No scleral icterus.     Conjunctiva/sclera: Conjunctivae normal.   Cardiovascular:      Rate and Rhythm: Normal rate.   Pulmonary:      Effort: Pulmonary effort is normal. No respiratory distress.   Abdominal:      General: There is no distension.      Palpations: Abdomen is soft.      Tenderness: There is no abdominal tenderness.   Musculoskeletal:         General: Normal range of motion.       Cervical back: Normal range of motion and neck supple.   Skin:     General: Skin is warm and dry.   Neurological:      Mental Status: She is alert and oriented to person, place, and time.      Cranial Nerves: No cranial nerve deficit.   Psychiatric:         Behavior: Behavior normal.         Current Outpatient Medications   Medication Sig Dispense Refill    acetaminophen (TYLENOL) 650 MG TbSR Take 1,300 mg by mouth 2 (two) times daily as needed.      albuterol-ipratropium (DUO-NEB) 2.5 mg-0.5 mg/3 mL nebulizer solution Take 3 mLs by nebulization every 4 (four) hours as needed for Wheezing or Shortness of Breath.      apixaban (ELIQUIS) 5 mg Tab TAKE 1 TABLET BY MOUTH TWICE A  tablet 3    ascorbic acid, vitamin C, (VITAMIN C) 500 MG tablet Take 1,000 mg by mouth once daily.       atorvastatin (LIPITOR) 10 MG tablet Take 1 tablet (10 mg total) by mouth once daily. 30 tablet 0    calcium carbonate/vitamin D3 (CALTRATE 600 + D ORAL) Take 1 tablet by mouth once daily.      carvediloL (COREG) 12.5 MG tablet Take 1 tablet (12.5 mg total) by mouth 2 (two) times daily. 180 tablet 3    clopidogreL (PLAVIX) 75 mg tablet TAKE 1 TABLET BY MOUTH EVERY DAY 90 tablet 3    diltiaZEM (CARDIZEM CD) 240 MG 24 hr capsule TAKE 1 CAPSULE BY MOUTH ONCE A DAY 90 capsule 3    DULoxetine (CYMBALTA) 20 MG capsule Take 1 capsule (20 mg total) by mouth once daily. 30 capsule 0    empagliflozin (JARDIANCE) 10 mg tablet Take 1 tablet (10 mg total) by mouth once daily. 30 tablet 11    fexofenadine (ALLEGRA) 60 MG tablet Take 60 mg by mouth once daily.      fish oil-omega-3 fatty acids 300-1,000 mg capsule Take 1 g by mouth once daily.       furosemide (LASIX) 40 MG tablet Take 40mg (1 tablet) in the morning and 20mg (1/2 tablet) in the afternoon 60 tablet 11    gabapentin (NEURONTIN) 600 MG tablet Take 600 mg by mouth 3 (three) times daily.      isosorbide mononitrate (IMDUR) 60 MG 24 hr tablet Take 1 tablet (60 mg total) by mouth once  daily. 30 tablet 0    losartan (COZAAR) 50 MG Tab Take 1 tablet (50 mg total) by mouth once daily. 30 tablet 11    metFORMIN (GLUCOPHAGE) 500 MG tablet Take 1 tablet (500 mg total) by mouth 2 (two) times daily. 60 tablet 0    montelukast (SINGULAIR) 10 mg tablet Take 10 mg by mouth once daily.      mv-mn/folic ac/calcium/vit K1 (WOMEN'S 50 PLUS MULTIVITAMIN ORAL) Take 1 tablet by mouth once daily.      nystatin-triamcinolone (MYCOLOG) ointment Apply topically 3 (three) times daily.      oxybutynin (DITROPAN-XL) 5 MG TR24 Take 1 tablet (5 mg total) by mouth every other day. 15 tablet 0    pantoprazole (PROTONIX) 40 MG tablet Take 1 tablet (40 mg total) by mouth once daily. 90 tablet 3    phenazopyridine HCl (PYRIDIUM ORAL) Take 1 tablet by mouth 3 (three) times daily as needed.      POLYSORBATE 80/GLYCERIN (REFRESH DRY EYE THERAPY OPHT) Apply to eye 2 (two) times daily.      potassium gluconate 550 mg (90 mg) Tab Take 180 mg by mouth 2 (two) times a day.      ranolazine (RANEXA) 1,000 mg Tb12 Take 1 tablet (1,000 mg total) by mouth 2 (two) times daily. 180 tablet 3    SYMBICORT 160-4.5 mcg/actuation HFAA Inhale 1 puff into the lungs 2 (two) times daily.      vitamin D (VITAMIN D3) 1000 units Tab Take 2,000 Units by mouth once daily.      ZINC ORAL Take 1 tablet by mouth once daily.      LORazepam (ATIVAN) 0.5 MG tablet Take 1 tablet (0.5 mg total) by mouth every morning. 30 tablet 2    nitroGLYCERIN (NITROSTAT) 0.4 MG SL tablet Place 1 tablet (0.4 mg total) under the tongue every 5 (five) minutes as needed for Chest pain. 25 tablet PRN     No current facility-administered medications for this visit.     Facility-Administered Medications Ordered in Other Visits   Medication Dose Route Frequency Provider Last Rate Last Admin    0.9%  NaCl infusion   Intravenous Continuous Lynette Hightower, NP   New Bag at 02/10/20 1027    sodium chloride 0.9% flush 5 mL  5 mL Intravenous PRN Lynette Hightower, AMY          Lab  Results   Component Value Date    WBC 7.60 10/02/2023    HGB 11.6 (L) 10/02/2023    HCT 37.4 10/02/2023    MCV 96 10/02/2023     10/02/2023        CMP  Sodium   Date Value Ref Range Status   10/27/2023 136 136 - 145 mmol/L Final     Potassium   Date Value Ref Range Status   10/27/2023 4.8 3.5 - 5.1 mmol/L Final     Chloride   Date Value Ref Range Status   10/27/2023 98 95 - 110 mmol/L Final     CO2   Date Value Ref Range Status   10/27/2023 29 23 - 29 mmol/L Final     Glucose   Date Value Ref Range Status   10/27/2023 102 70 - 110 mg/dL Final     BUN   Date Value Ref Range Status   10/27/2023 20 8 - 23 mg/dL Final     Creatinine   Date Value Ref Range Status   10/27/2023 0.9 0.5 - 1.4 mg/dL Final     Calcium   Date Value Ref Range Status   10/27/2023 9.6 8.7 - 10.5 mg/dL Final     Total Protein   Date Value Ref Range Status   10/27/2023 7.0 6.0 - 8.4 g/dL Final     Albumin   Date Value Ref Range Status   10/27/2023 3.7 3.5 - 5.2 g/dL Final     Total Bilirubin   Date Value Ref Range Status   10/27/2023 0.4 0.1 - 1.0 mg/dL Final     Comment:     For infants and newborns, interpretation of results should be based  on gestational age, weight and in agreement with clinical  observations.    Premature Infant recommended reference ranges:  Up to 24 hours.............<8.0 mg/dL  Up to 48 hours............<12.0 mg/dL  3-5 days..................<15.0 mg/dL  6-29 days.................<15.0 mg/dL       Alkaline Phosphatase   Date Value Ref Range Status   10/27/2023 40 (L) 55 - 135 U/L Final     AST   Date Value Ref Range Status   10/27/2023 18 10 - 40 U/L Final     ALT   Date Value Ref Range Status   10/27/2023 16 10 - 44 U/L Final     Anion Gap   Date Value Ref Range Status   10/27/2023 9 8 - 16 mmol/L Final     eGFR if    Date Value Ref Range Status   12/04/2021 >60 >60 mL/min/1.73 m^2 Final     eGFR if non    Date Value Ref Range Status   12/04/2021 >60 >60 mL/min/1.73 m^2 Final      Comment:     Calculation used to obtain the estimated glomerular filtration  rate (eGFR) is the CKD-EPI equation.             Assessment:       Problem List Items Addressed This Visit          Cardiac/Vascular    S/P TAVR (transcatheter aortic valve replacement) - Primary    Relevant Orders    Cytology-FNA Non-Radiology Clinician Performed w/o on site     Other Visit Diagnoses       Elevated serum immunoglobulin free light chains        Relevant Orders    Cytology-FNA Non-Radiology Clinician Performed w/o on site            Plan:       Referral from cardiology for mildly elevated kappa free light chain with normal SPEP and DEREJE.  Elevated kappa free light chain likely just age related.  Extensive cardiovascular disease history and evaluation for amyloidosis by cardiology. Screening for ATTR amyloid negative. Here to consider screening for AL amyloidosis (suspicion low by hematology and cardiology).    We discussed today that amyloidosis is a type of abnormal immune protein deposition into tissue, often at random. The amyloid protein has an affinity for heart tissue therefore an entity of cardiac amyloid causing heart failure exists. It is extremely rare. One sign of this possibility is an elevated kappa or lambda free light chain. If cardiac AL amyloidosis exists it is considered a blood cancer and is treated with cancer treatments.     An easy way to exclude this diagnosis is a simple biopsy of fat from tissue below the abdominal skin. Therefore a fat pad biopsy was recommended today. Even on anticoagulation the procedure is extremely low risk for bleeding or infection. It was decided to proceed with biopsy today.    In the clinic room a bilateral fat pad aspirate was completed by pathology. There was no bleeding from either biopsy site at conclusion of procedure and a band-aid was placed on each area. We discussed there may be some post-procedure bruising.    The performed test on the tissue, Congo red staining,  should return within 1 week. Will call patient with results which are currently expected to be negative. The biopsy was completed to confirm negative tests for amyloidosis.      A total of 30 minutes was spent in pre-visit chart review, personal interpretation of labs and imaging, and medication review. Total visit time 60 minutes, >50 % counseling.

## 2024-01-02 NOTE — PROCEDURES
"Fine Needle Aspiration (FNA) Procedure Note - Abdominal Fat Pad    Adriana Strong is a 79 y.o. female patient referred from cardiology for fat pad biopsy to rule out amyloidosis. She has mildly elevated kappa free light chains.    Temp: (P) 97.9 °F (36.6 °C) (01/02/24 1440)  Pulse: (P) 68 (01/02/24 1440)  Resp: (P) 18 (01/02/24 1440)  BP: (!) (P) 145/68 (01/02/24 1440)  SpO2: (P) 99 % (01/02/24 1440)  Weight: 104.5 kg (230 lb 7.9 oz) (01/02/24 1440)  Height: 5' 4" (162.6 cm) (01/02/24 1440)       The nature of the procedure, including indication, limitations, and risks were explained to the patient, and a consent form was signed. A timeout was performed at 1600.    The skin was prepared with alcohol and two (2) fine needle aspirate passes were performed in the left lower and right lower abdominal fat pads. Material was obtained, and results will follow in a separate report. The patient tolerated the procedure well.    Additional comments: None  Complications: None  "

## 2024-01-05 ENCOUNTER — PATIENT MESSAGE (OUTPATIENT)
Dept: CARDIOLOGY | Facility: CLINIC | Age: 79
End: 2024-01-05
Payer: MEDICARE

## 2024-01-05 DIAGNOSIS — J42 CHRONIC BRONCHITIS, UNSPECIFIED CHRONIC BRONCHITIS TYPE: Primary | ICD-10-CM

## 2024-01-05 DIAGNOSIS — I50.32 CHRONIC HEART FAILURE WITH PRESERVED EJECTION FRACTION: ICD-10-CM

## 2024-01-08 LAB
FINAL PATHOLOGIC DIAGNOSIS: NORMAL
MICROSCOPIC EXAM: NORMAL

## 2024-01-10 ENCOUNTER — CLINICAL SUPPORT (OUTPATIENT)
Dept: CARDIOLOGY | Facility: CLINIC | Age: 79
End: 2024-01-10
Payer: MEDICARE

## 2024-01-10 DIAGNOSIS — Z95.0 CARDIAC PACEMAKER IN SITU: Primary | ICD-10-CM

## 2024-01-10 PROCEDURE — 93296 REM INTERROG EVL PM/IDS: CPT | Mod: S$GLB,,, | Performed by: INTERNAL MEDICINE

## 2024-01-10 PROCEDURE — 93294 REM INTERROG EVL PM/LDLS PM: CPT | Mod: S$GLB,,, | Performed by: INTERNAL MEDICINE

## 2024-01-17 NOTE — PROGRESS NOTES
Subjective:      Patient ID: Adriana Strong is a 79 y.o. female.    Chief Complaint: No chief complaint on file.    HPI:    ROS Biotronik pacemaker remote interrogation report downloaded and prepared by medical assistant and interpreted by me and full report scanned into Epic media.  Known chronic a fib on Eliquis.  Battery OK at 80%.  Leads OK.  Normal device function.    Past Medical History:   Diagnosis Date    Acute on chronic diastolic (congestive) heart failure 11/20/2019    Anticoagulant long-term use     on Plavix since May 2015    Anxiety     Arthritis     Asthma     Atrial fibrillation     Atrial fibrillation with RVR 10/19/2020    Cataracts, bilateral     Chest pain, atypical     Chronic atrial fibrillation with rapid ventricular response 6/23/2017    Colon polyp     Coronary artery disease     Diabetes mellitus     Dry eyes     Esophageal erosions     GERD (gastroesophageal reflux disease)     Heart murmur     Hemorrhoid     High cholesterol     Hypertension     Irritable bowel syndrome     Paroxysmal atrial fibrillation 10/30/2020    Persistent atrial fibrillation 2/10/2020    Shingles 2015    Stenosis of aortic and mitral valves     Stroke     TIA (transient ischemic attack)     Use of cane as ambulatory aid         Past Surgical History:   Procedure Laterality Date    ABDOMINAL SURGERY      stents to SMA    angiocele      2007, 2014    AORTIC VALVE REPLACEMENT N/A 11/19/2019    Procedure: Replacement-valve-aortic;  Surgeon: Jack Capone MD;  Location: [facility] CATH LAB;  Service: Cardiology;  Laterality: N/A;    BREAST SURGERY      left--- a lump--- no cancer    CARDIAC VALVE SURGERY      COLONOSCOPY  2014    COLONOSCOPY N/A 3/14/2018    Procedure: COLONOSCOPY;  Surgeon: Efren Kemp MD;  Location: [facility] ENDO ([address] [place] [address]);  Service: Endoscopy;  Laterality: N/A;  ok to hold Plavix 5 days prior to procedure per Dr RESHMA Hernandez     ok per Dr Kemp to hold Eliquis 2 days prior to procedure (see telephone  encounter dated-2/7/18)    HERNIA REPAIR      HYSTERECTOMY  partial    1982 partial hysterectomy    LEFT HEART CATHETERIZATION Right 11/5/2019    Procedure: Left heart cath;  Surgeon: Jack Capone MD;  Location: [facility] CATH LAB;  Service: Cardiology;  Laterality: Right;    left nasal polyp      left neck lymph node      nose polyp      right hip fatty mass tissue      stent to small intestine      SUPERIOR VENA CAVA ANGIOPLASTY / STENTING      TONSILLECTOMY      TOTAL ABDOMINAL HYSTERECTOMY      2014    TOTAL KNEE ARTHROPLASTY Bilateral     TREATMENT OF CARDIAC ARRHYTHMIA N/A 2/10/2020    Procedure: CARDIOVERSION;  Surgeon: Jayy Parrish MD;  Location: [facility] EP LAB;  Service: Cardiology;  Laterality: N/A;  AF, PEDRO, DCCV, MAC, DM, 3 Prep    TREATMENT OF CARDIAC ARRHYTHMIA N/A 5/15/2020    Procedure: CARDIOVERSION;  Surgeon: Hany Bee MD;  Location: [facility] EP LAB;  Service: Cardiology;  Laterality: N/A;  AF, PEDRO, DCCV, MAC, DM, 3 Prep    UPPER GASTROINTESTINAL ENDOSCOPY  2014       Family History   Problem Relation Age of Onset    Heart attack Mother     Stroke Father     Heart failure Brother     Colon cancer Neg Hx     Inflammatory bowel disease Neg Hx        Social History     Socioeconomic History    Marital status:    Tobacco Use    Smoking status: Never    Smokeless tobacco: Never   Substance and Sexual Activity    Alcohol use: No    Drug use: No    Sexual activity: Not Currently     Social Determinants of Health     Financial Resource Strain: Low Risk  (9/25/2023)    Overall Financial Resource Strain (CARDIA)     Difficulty of Paying Living Expenses: Not hard at all   Food Insecurity: No Food Insecurity (9/25/2023)    Hunger Vital Sign     Worried About Running Out of Food in the Last Year: Never true     Ran Out of Food in the Last Year: Never true   Transportation Needs: No Transportation Needs (9/25/2023)    PRAPARE - Transportation     Lack of Transportation (Medical): No     Lack of  Transportation (Non-Medical): No   Physical Activity: Inactive (9/25/2023)    Exercise Vital Sign     Days of Exercise per Week: 0 days     Minutes of Exercise per Session: 0 min   Stress: No Stress Concern Present (9/25/2023)    [employer] [employer] - Occupational Stress Questionnaire     Feeling of Stress : Only a little   Social Connections: Moderately Integrated (9/25/2023)    Social Connection and Isolation Panel [NHANES]     Frequency of Communication with Friends and Family: More than three times a week     Frequency of Social Gatherings with Friends and Family: More than three times a week     Attends Yazidi Services: More than 4 times per year     Active Member of Clubs or Organizations: No     Attends Club or Organization Meetings: Never     Marital Status:    Housing Stability: Unknown (9/25/2023)    Housing Stability Vital Sign     Unable to Pay for Housing in the Last Year: No     Unstable Housing in the Last Year: No       Current Outpatient Medications on File Prior to Visit   Medication Sig Dispense Refill    acetaminophen (TYLENOL) 650 MG TbSR Take 1,300 mg by mouth 2 (two) times daily as needed.      albuterol-ipratropium (DUO-NEB) 2.5 mg-0.5 mg/3 mL nebulizer solution Take 3 mLs by nebulization every 4 (four) hours as needed for Wheezing or Shortness of Breath.      apixaban (ELIQUIS) 5 mg Tab TAKE 1 TABLET BY MOUTH TWICE A  tablet 3    ascorbic acid, vitamin C, (VITAMIN C) 500 MG tablet Take 1,000 mg by mouth once daily.       atorvastatin (LIPITOR) 10 MG tablet Take 1 tablet (10 mg total) by mouth once daily. 30 tablet 0    calcium carbonate/vitamin D3 (CALTRATE 600 + D ORAL) Take 1 tablet by mouth once daily.      carvediloL (COREG) 12.5 MG tablet Take 1 tablet (12.5 mg total) by mouth 2 (two) times daily. 180 tablet 3    clopidogreL (PLAVIX) 75 mg tablet TAKE 1 TABLET BY MOUTH EVERY DAY 90 tablet 3    diltiaZEM (CARDIZEM CD) 240 MG 24 hr capsule TAKE 1  CAPSULE BY MOUTH ONCE A DAY 90 capsule 3    DULoxetine (CYMBALTA) 20 MG capsule Take 1 capsule (20 mg total) by mouth once daily. 30 capsule 0    empagliflozin (JARDIANCE) 10 mg tablet Take 1 tablet (10 mg total) by mouth once daily. 30 tablet 11    fexofenadine (ALLEGRA) 60 MG tablet Take 60 mg by mouth once daily.      fish oil-omega-3 fatty acids 300-1,000 mg capsule Take 1 g by mouth once daily.       furosemide (LASIX) 40 MG tablet Take 40mg (1 tablet) in the morning and 20mg (1/2 tablet) in the afternoon 60 tablet 11    gabapentin (NEURONTIN) 600 MG tablet Take 600 mg by mouth 3 (three) times daily.      isosorbide mononitrate (IMDUR) 60 MG 24 hr tablet Take 1 tablet (60 mg total) by mouth once daily. 30 tablet 0    LORazepam (ATIVAN) 0.5 MG tablet Take 1 tablet (0.5 mg total) by mouth every morning. 30 tablet 2    losartan (COZAAR) 50 MG Tab Take 1 tablet (50 mg total) by mouth once daily. 30 tablet 11    metFORMIN (GLUCOPHAGE) 500 MG tablet Take 1 tablet (500 mg total) by mouth 2 (two) times daily. 60 tablet 0    montelukast (SINGULAIR) 10 mg tablet Take 10 mg by mouth once daily.      mv-mn/folic ac/calcium/vit K1 (WOMEN'S 50 PLUS MULTIVITAMIN ORAL) Take 1 tablet by mouth once daily.      nitroGLYCERIN (NITROSTAT) 0.4 MG SL tablet Place 1 tablet (0.4 mg total) under the tongue every 5 (five) minutes as needed for Chest pain. 25 tablet PRN    nystatin-triamcinolone (MYCOLOG) ointment Apply topically 3 (three) times daily.      oxybutynin (DITROPAN-XL) 5 MG TR24 Take 1 tablet (5 mg total) by mouth every other day. 15 tablet 0    pantoprazole (PROTONIX) 40 MG tablet Take 1 tablet (40 mg total) by mouth once daily. 90 tablet 3    phenazopyridine HCl (PYRIDIUM ORAL) Take 1 tablet by mouth 3 (three) times daily as needed.      POLYSORBATE 80/GLYCERIN (REFRESH DRY EYE THERAPY OPHT) Apply to eye 2 (two) times daily.      potassium gluconate 550 mg (90 mg) Tab Take 180 mg by mouth 2 (two) times a day.       ranolazine (RANEXA) 1,000 mg Tb12 Take 1 tablet (1,000 mg total) by mouth 2 (two) times daily. 180 tablet 3    SYMBICORT 160-4.5 mcg/actuation HFAA Inhale 1 puff into the lungs 2 (two) times daily.      vitamin D (VITAMIN D3) 1000 units Tab Take 2,000 Units by mouth once daily.      ZINC ORAL Take 1 tablet by mouth once daily.       Current Facility-Administered Medications on File Prior to Visit   Medication Dose Route Frequency Provider Last Rate Last Admin    0.9%  NaCl infusion   Intravenous Continuous Lynette Hightower NP   New Bag at 02/10/20 1029    sodium chloride 0.9% flush 5 mL  5 mL Intravenous PRN Lynette Hightower NP           Review of patient's allergies indicates:   Allergen Reactions    Celebrex [celecoxib] Shortness Of Breath    Ciprofloxacin Swelling     lip    Dicyclomine Hives    Adhesive Dermatitis    Avelox [moxifloxacin] Swelling    Cilostazol Other (See Comments)     Elevates blood pressure    Eryc [erythromycin] Other (See Comments)     unknown    Iodine and iodide containing products Hives    Keflex [cephalexin] Hives    Meclomen      rashes    Meloxicam      Ear ringing    Metoclopramide Other (See Comments)     High blood pressure    Sulfa (sulfonamide antibiotics) Itching     Objective:   There were no vitals filed for this visit.     Physical Exam     Assessment:     1. Cardiac pacemaker in situ      Plan:   Diagnoses and all orders for this visit:    Cardiac pacemaker in situ         No follow-ups on file.

## 2024-02-09 ENCOUNTER — OFFICE VISIT (OUTPATIENT)
Dept: CARDIOLOGY | Facility: CLINIC | Age: 79
End: 2024-02-09
Payer: MEDICARE

## 2024-02-09 ENCOUNTER — HOSPITAL ENCOUNTER (INPATIENT)
Facility: HOSPITAL | Age: 79
LOS: 5 days | Discharge: HOME | DRG: 286 | End: 2024-02-16
Attending: EMERGENCY MEDICINE | Admitting: INTERNAL MEDICINE
Payer: MEDICARE

## 2024-02-09 VITALS
BODY MASS INDEX: 38.66 KG/M2 | HEART RATE: 70 BPM | SYSTOLIC BLOOD PRESSURE: 126 MMHG | HEIGHT: 64 IN | OXYGEN SATURATION: 94 % | WEIGHT: 226.44 LBS | DIASTOLIC BLOOD PRESSURE: 60 MMHG

## 2024-02-09 DIAGNOSIS — Z95.2 S/P TAVR (TRANSCATHETER AORTIC VALVE REPLACEMENT): ICD-10-CM

## 2024-02-09 DIAGNOSIS — J44.9 CHRONIC OBSTRUCTIVE PULMONARY DISEASE, UNSPECIFIED COPD TYPE: ICD-10-CM

## 2024-02-09 DIAGNOSIS — R07.9 CHEST PAIN: ICD-10-CM

## 2024-02-09 DIAGNOSIS — R94.39 ABNORMAL STRESS TEST: ICD-10-CM

## 2024-02-09 DIAGNOSIS — I50.32 CHRONIC HEART FAILURE WITH PRESERVED EJECTION FRACTION: Primary | ICD-10-CM

## 2024-02-09 DIAGNOSIS — I48.20 CHRONIC ATRIAL FIBRILLATION: ICD-10-CM

## 2024-02-09 DIAGNOSIS — R06.02 SHORTNESS OF BREATH: ICD-10-CM

## 2024-02-09 DIAGNOSIS — E78.5 HYPERLIPIDEMIA, UNSPECIFIED HYPERLIPIDEMIA TYPE: ICD-10-CM

## 2024-02-09 DIAGNOSIS — R06.02 SOB (SHORTNESS OF BREATH): ICD-10-CM

## 2024-02-09 DIAGNOSIS — I50.9 CHF (CONGESTIVE HEART FAILURE): ICD-10-CM

## 2024-02-09 DIAGNOSIS — R07.9 CHEST PAIN AT REST: ICD-10-CM

## 2024-02-09 DIAGNOSIS — I10 ESSENTIAL HYPERTENSION: ICD-10-CM

## 2024-02-09 DIAGNOSIS — I50.23 ACUTE ON CHRONIC SYSTOLIC HEART FAILURE: Primary | ICD-10-CM

## 2024-02-09 DIAGNOSIS — I50.9 HEART FAILURE: ICD-10-CM

## 2024-02-09 DIAGNOSIS — I51.9 SYSTOLIC DYSFUNCTION: ICD-10-CM

## 2024-02-09 DIAGNOSIS — I25.111 CORONARY ARTERY DISEASE INVOLVING NATIVE CORONARY ARTERY OF NATIVE HEART WITH ANGINA PECTORIS WITH DOCUMENTED SPASM: ICD-10-CM

## 2024-02-09 LAB
ALBUMIN SERPL BCP-MCNC: 3.7 G/DL (ref 3.5–5.2)
ALP SERPL-CCNC: 44 U/L (ref 55–135)
ALT SERPL W/O P-5'-P-CCNC: 14 U/L (ref 10–44)
ANION GAP SERPL CALC-SCNC: 10 MMOL/L (ref 8–16)
AST SERPL-CCNC: 31 U/L (ref 10–40)
BASOPHILS # BLD AUTO: 0.05 K/UL (ref 0–0.2)
BASOPHILS NFR BLD: 0.7 % (ref 0–1.9)
BILIRUB SERPL-MCNC: 0.3 MG/DL (ref 0.1–1)
BNP SERPL-MCNC: 210 PG/ML (ref 0–99)
BUN SERPL-MCNC: 24 MG/DL (ref 8–23)
CALCIUM SERPL-MCNC: 9.2 MG/DL (ref 8.7–10.5)
CHLORIDE SERPL-SCNC: 98 MMOL/L (ref 95–110)
CO2 SERPL-SCNC: 29 MMOL/L (ref 23–29)
CREAT SERPL-MCNC: 0.8 MG/DL (ref 0.5–1.4)
DIFFERENTIAL METHOD BLD: ABNORMAL
EOSINOPHIL # BLD AUTO: 0 K/UL (ref 0–0.5)
EOSINOPHIL NFR BLD: 0.6 % (ref 0–8)
ERYTHROCYTE [DISTWIDTH] IN BLOOD BY AUTOMATED COUNT: 15.8 % (ref 11.5–14.5)
EST. GFR  (NO RACE VARIABLE): >60 ML/MIN/1.73 M^2
ESTIMATED AVG GLUCOSE: 97 MG/DL (ref 68–131)
GLUCOSE SERPL-MCNC: 89 MG/DL (ref 70–110)
HBA1C MFR BLD: 5 % (ref 4–5.6)
HCT VFR BLD AUTO: 40.3 % (ref 37–48.5)
HGB BLD-MCNC: 12.8 G/DL (ref 12–16)
IMM GRANULOCYTES # BLD AUTO: 0.03 K/UL (ref 0–0.04)
IMM GRANULOCYTES NFR BLD AUTO: 0.4 % (ref 0–0.5)
LYMPHOCYTES # BLD AUTO: 1.9 K/UL (ref 1–4.8)
LYMPHOCYTES NFR BLD: 26.8 % (ref 18–48)
MCH RBC QN AUTO: 30.4 PG (ref 27–31)
MCHC RBC AUTO-ENTMCNC: 31.8 G/DL (ref 32–36)
MCV RBC AUTO: 96 FL (ref 82–98)
MONOCYTES # BLD AUTO: 0.7 K/UL (ref 0.3–1)
MONOCYTES NFR BLD: 10.5 % (ref 4–15)
NEUTROPHILS # BLD AUTO: 4.3 K/UL (ref 1.8–7.7)
NEUTROPHILS NFR BLD: 61 % (ref 38–73)
NRBC BLD-RTO: 0 /100 WBC
PLATELET # BLD AUTO: 235 K/UL (ref 150–450)
PMV BLD AUTO: 10.6 FL (ref 9.2–12.9)
POTASSIUM SERPL-SCNC: 5 MMOL/L (ref 3.5–5.1)
PROT SERPL-MCNC: 7.4 G/DL (ref 6–8.4)
RBC # BLD AUTO: 4.21 M/UL (ref 4–5.4)
SODIUM SERPL-SCNC: 137 MMOL/L (ref 136–145)
TROPONIN I SERPL DL<=0.01 NG/ML-MCNC: <0.006 NG/ML (ref 0–0.03)
TSH SERPL DL<=0.005 MIU/L-ACNC: 3.33 UIU/ML (ref 0.4–4)
WBC # BLD AUTO: 7.02 K/UL (ref 3.9–12.7)

## 2024-02-09 PROCEDURE — 99285 EMERGENCY DEPT VISIT HI MDM: CPT | Mod: 25

## 2024-02-09 PROCEDURE — 84484 ASSAY OF TROPONIN QUANT: CPT | Performed by: EMERGENCY MEDICINE

## 2024-02-09 PROCEDURE — 25000242 PHARM REV CODE 250 ALT 637 W/ HCPCS: Performed by: FAMILY MEDICINE

## 2024-02-09 PROCEDURE — 85025 COMPLETE CBC W/AUTO DIFF WBC: CPT | Performed by: EMERGENCY MEDICINE

## 2024-02-09 PROCEDURE — 25000003 PHARM REV CODE 250: Performed by: FAMILY MEDICINE

## 2024-02-09 PROCEDURE — 94761 N-INVAS EAR/PLS OXIMETRY MLT: CPT

## 2024-02-09 PROCEDURE — 80053 COMPREHEN METABOLIC PANEL: CPT | Performed by: EMERGENCY MEDICINE

## 2024-02-09 PROCEDURE — 63600175 PHARM REV CODE 636 W HCPCS: Performed by: PHYSICIAN ASSISTANT

## 2024-02-09 PROCEDURE — 84443 ASSAY THYROID STIM HORMONE: CPT | Performed by: FAMILY MEDICINE

## 2024-02-09 PROCEDURE — 99215 OFFICE O/P EST HI 40 MIN: CPT | Mod: GC,S$GLB,, | Performed by: STUDENT IN AN ORGANIZED HEALTH CARE EDUCATION/TRAINING PROGRAM

## 2024-02-09 PROCEDURE — G0378 HOSPITAL OBSERVATION PER HR: HCPCS

## 2024-02-09 PROCEDURE — 93005 ELECTROCARDIOGRAM TRACING: CPT

## 2024-02-09 PROCEDURE — 94640 AIRWAY INHALATION TREATMENT: CPT

## 2024-02-09 PROCEDURE — 93010 ELECTROCARDIOGRAM REPORT: CPT | Mod: ,,, | Performed by: INTERNAL MEDICINE

## 2024-02-09 PROCEDURE — 99417 PROLNG OP E/M EACH 15 MIN: CPT | Mod: GC,S$GLB,, | Performed by: STUDENT IN AN ORGANIZED HEALTH CARE EDUCATION/TRAINING PROGRAM

## 2024-02-09 PROCEDURE — 99999 PR PBB SHADOW E&M-EST. PATIENT-LVL V: CPT | Mod: PBBFAC,GC,, | Performed by: STUDENT IN AN ORGANIZED HEALTH CARE EDUCATION/TRAINING PROGRAM

## 2024-02-09 PROCEDURE — 83036 HEMOGLOBIN GLYCOSYLATED A1C: CPT | Performed by: FAMILY MEDICINE

## 2024-02-09 PROCEDURE — 27000221 HC OXYGEN, UP TO 24 HOURS

## 2024-02-09 PROCEDURE — 96374 THER/PROPH/DIAG INJ IV PUSH: CPT

## 2024-02-09 PROCEDURE — 83880 ASSAY OF NATRIURETIC PEPTIDE: CPT | Performed by: EMERGENCY MEDICINE

## 2024-02-09 RX ORDER — ONDANSETRON 4 MG/1
4 TABLET, ORALLY DISINTEGRATING ORAL EVERY 8 HOURS PRN
Status: DISCONTINUED | OUTPATIENT
Start: 2024-02-09 | End: 2024-02-16 | Stop reason: HOSPADM

## 2024-02-09 RX ORDER — MONTELUKAST SODIUM 10 MG/1
10 TABLET ORAL DAILY
Status: DISCONTINUED | OUTPATIENT
Start: 2024-02-10 | End: 2024-02-16 | Stop reason: HOSPADM

## 2024-02-09 RX ORDER — SODIUM CHLORIDE 0.9 % (FLUSH) 0.9 %
10 SYRINGE (ML) INJECTION
Status: DISCONTINUED | OUTPATIENT
Start: 2024-02-09 | End: 2024-02-16 | Stop reason: HOSPADM

## 2024-02-09 RX ORDER — DULOXETIN HYDROCHLORIDE 20 MG/1
20 CAPSULE, DELAYED RELEASE ORAL DAILY
Status: DISCONTINUED | OUTPATIENT
Start: 2024-02-10 | End: 2024-02-16 | Stop reason: HOSPADM

## 2024-02-09 RX ORDER — ACETAMINOPHEN 500 MG
1000 TABLET ORAL EVERY 6 HOURS PRN
Status: DISCONTINUED | OUTPATIENT
Start: 2024-02-10 | End: 2024-02-16 | Stop reason: HOSPADM

## 2024-02-09 RX ORDER — CLOPIDOGREL BISULFATE 75 MG/1
75 TABLET ORAL DAILY
Status: DISCONTINUED | OUTPATIENT
Start: 2024-02-10 | End: 2024-02-16 | Stop reason: HOSPADM

## 2024-02-09 RX ORDER — LORAZEPAM 0.5 MG/1
0.5 TABLET ORAL EVERY MORNING
Status: DISCONTINUED | OUTPATIENT
Start: 2024-02-10 | End: 2024-02-16 | Stop reason: HOSPADM

## 2024-02-09 RX ORDER — FLUTICASONE FUROATE AND VILANTEROL 100; 25 UG/1; UG/1
1 POWDER RESPIRATORY (INHALATION) DAILY
Status: DISCONTINUED | OUTPATIENT
Start: 2024-02-10 | End: 2024-02-16 | Stop reason: HOSPADM

## 2024-02-09 RX ORDER — LOSARTAN POTASSIUM 50 MG/1
50 TABLET ORAL DAILY
Status: DISCONTINUED | OUTPATIENT
Start: 2024-02-10 | End: 2024-02-13

## 2024-02-09 RX ORDER — IBUPROFEN 200 MG
24 TABLET ORAL
Status: DISCONTINUED | OUTPATIENT
Start: 2024-02-10 | End: 2024-02-16 | Stop reason: HOSPADM

## 2024-02-09 RX ORDER — ISOSORBIDE MONONITRATE 30 MG/1
60 TABLET, EXTENDED RELEASE ORAL DAILY
Status: DISCONTINUED | OUTPATIENT
Start: 2024-02-10 | End: 2024-02-16 | Stop reason: HOSPADM

## 2024-02-09 RX ORDER — PANTOPRAZOLE SODIUM 40 MG/1
40 TABLET, DELAYED RELEASE ORAL DAILY
Status: DISCONTINUED | OUTPATIENT
Start: 2024-02-10 | End: 2024-02-16 | Stop reason: HOSPADM

## 2024-02-09 RX ORDER — CARVEDILOL 12.5 MG/1
12.5 TABLET ORAL 2 TIMES DAILY
Status: DISCONTINUED | OUTPATIENT
Start: 2024-02-09 | End: 2024-02-14

## 2024-02-09 RX ORDER — LEVALBUTEROL 1.25 MG/.5ML
1.25 SOLUTION, CONCENTRATE RESPIRATORY (INHALATION) ONCE
Status: COMPLETED | OUTPATIENT
Start: 2024-02-09 | End: 2024-02-09

## 2024-02-09 RX ORDER — GABAPENTIN 300 MG/1
600 CAPSULE ORAL 3 TIMES DAILY
Status: DISCONTINUED | OUTPATIENT
Start: 2024-02-10 | End: 2024-02-16 | Stop reason: HOSPADM

## 2024-02-09 RX ORDER — DILTIAZEM HYDROCHLORIDE 240 MG/1
240 CAPSULE, COATED, EXTENDED RELEASE ORAL DAILY
Status: DISCONTINUED | OUTPATIENT
Start: 2024-02-10 | End: 2024-02-12

## 2024-02-09 RX ORDER — FUROSEMIDE 10 MG/ML
80 INJECTION INTRAMUSCULAR; INTRAVENOUS
Status: COMPLETED | OUTPATIENT
Start: 2024-02-09 | End: 2024-02-09

## 2024-02-09 RX ORDER — ATORVASTATIN CALCIUM 10 MG/1
10 TABLET, FILM COATED ORAL DAILY
Status: DISCONTINUED | OUTPATIENT
Start: 2024-02-10 | End: 2024-02-16 | Stop reason: HOSPADM

## 2024-02-09 RX ORDER — FUROSEMIDE 10 MG/ML
80 INJECTION INTRAMUSCULAR; INTRAVENOUS 2 TIMES DAILY
Status: DISCONTINUED | OUTPATIENT
Start: 2024-02-10 | End: 2024-02-16 | Stop reason: HOSPADM

## 2024-02-09 RX ORDER — GLUCAGON 1 MG
1 KIT INJECTION
Status: DISCONTINUED | OUTPATIENT
Start: 2024-02-10 | End: 2024-02-16 | Stop reason: HOSPADM

## 2024-02-09 RX ORDER — INSULIN ASPART 100 [IU]/ML
0-5 INJECTION, SOLUTION INTRAVENOUS; SUBCUTANEOUS
Status: DISCONTINUED | OUTPATIENT
Start: 2024-02-10 | End: 2024-02-16 | Stop reason: HOSPADM

## 2024-02-09 RX ORDER — IBUPROFEN 200 MG
16 TABLET ORAL
Status: DISCONTINUED | OUTPATIENT
Start: 2024-02-10 | End: 2024-02-16 | Stop reason: HOSPADM

## 2024-02-09 RX ORDER — RANOLAZINE 1000 MG/1
1000 TABLET, EXTENDED RELEASE ORAL 2 TIMES DAILY
Status: DISCONTINUED | OUTPATIENT
Start: 2024-02-09 | End: 2024-02-16 | Stop reason: HOSPADM

## 2024-02-09 RX ORDER — IPRATROPIUM BROMIDE AND ALBUTEROL SULFATE 2.5; .5 MG/3ML; MG/3ML
3 SOLUTION RESPIRATORY (INHALATION) EVERY 4 HOURS PRN
Status: DISCONTINUED | OUTPATIENT
Start: 2024-02-10 | End: 2024-02-16 | Stop reason: HOSPADM

## 2024-02-09 RX ORDER — OXYCODONE HYDROCHLORIDE 5 MG/1
5 TABLET ORAL EVERY 6 HOURS PRN
Status: DISCONTINUED | OUTPATIENT
Start: 2024-02-10 | End: 2024-02-16 | Stop reason: HOSPADM

## 2024-02-09 RX ADMIN — CARVEDILOL 12.5 MG: 12.5 TABLET, FILM COATED ORAL at 10:02

## 2024-02-09 RX ADMIN — FUROSEMIDE 80 MG: 10 INJECTION, SOLUTION INTRAVENOUS at 08:02

## 2024-02-09 RX ADMIN — APIXABAN 5 MG: 5 TABLET, FILM COATED ORAL at 10:02

## 2024-02-09 RX ADMIN — LEVALBUTEROL 1.25 MG: 1.25 SOLUTION, CONCENTRATE RESPIRATORY (INHALATION) at 09:02

## 2024-02-09 RX ADMIN — RANOLAZINE 1000 MG: 1000 TABLET, FILM COATED, EXTENDED RELEASE ORAL at 10:02

## 2024-02-09 NOTE — PROGRESS NOTES
Cardiology Clinic Note  Reason for Visit: 2 month follow up, shortness of breath    HPI:   78F pmhx HLD, HTN, DM2, s/p TAVR (2019), CAD (pre TAVR 70% iLAD lesion), HFpEF, atrial fibrillation, PPM (placed at osh for bradycardia in 2021, intermittently VVI paced) here for 2 month follow up for shortness of breath. On 9/2023, patient was hospitalized for treatment of HFpEF and COPD, diuresed, discharged on home oxygen. Dyspneic on 12/18/2023 follow up because she didn't use her portable O2. Desaturated on her 6 minute walk test and resumed home O2.     Since then patient with ongoing shortness of breath. Reports she cannot walk across the room without getting SOB. Reports increasing bilateral leg swelling. Needs several pillows and 45 degree incline to sleep. Wheezing. Also reports chest tightness that occurs 1-5 minutes at a time, at random, unassociated with exercise nor eating, denies heartburn. Denies palpitations, lightheadedness. Patient reports compliance with diuretics and cardiac meds (lasix 40mg am, 20mg pm). Does not add salt to diet nor eats fried foods.       ROS:    Constitution: Negative for fever, chills, weight loss or gain.   HENT: Negative for sore throat, rhinorrhea, or headache.  Eyes: Negative for blurred or double vision.   Cardiovascular: See above  Pulmonary: Positive for SOB, wheezing  Gastrointestinal: Negative for abdominal pain, nausea, vomiting, or diarrhea.   : Negative for dysuria.   Neurological: Negative for focal weakness or sensory changes.  PMH:     Past Medical History:   Diagnosis Date    Acute on chronic diastolic (congestive) heart failure 11/20/2019    Anticoagulant long-term use     on Plavix since May 2015    Anxiety     Arthritis     Asthma     Atrial fibrillation     Atrial fibrillation with RVR 10/19/2020    Cataracts, bilateral     Chest pain, atypical     Chronic atrial fibrillation with rapid ventricular response 6/23/2017    Colon polyp     Coronary artery  disease     Diabetes mellitus     Dry eyes     Esophageal erosions     GERD (gastroesophageal reflux disease)     Heart murmur     Hemorrhoid     High cholesterol     Hypertension     Irritable bowel syndrome     Paroxysmal atrial fibrillation 10/30/2020    Persistent atrial fibrillation 2/10/2020    Shingles 2015    Stenosis of aortic and mitral valves     Stroke     TIA (transient ischemic attack)     Use of cane as ambulatory aid      Past Surgical History:   Procedure Laterality Date    ABDOMINAL SURGERY      stents to SMA    angiocele      2007, 2014    AORTIC VALVE REPLACEMENT N/A 11/19/2019    Procedure: Replacement-valve-aortic;  Surgeon: Jack Capone MD;  Location: [facility] CATH LAB;  Service: Cardiology;  Laterality: N/A;    BREAST SURGERY      left--- a lump--- no cancer    CARDIAC VALVE SURGERY      COLONOSCOPY  2014    COLONOSCOPY N/A 3/14/2018    Procedure: COLONOSCOPY;  Surgeon: Efren Kemp MD;  [facility]  Service: Endoscopy;  Laterality: N/A;  ok to hold Plavix 5 days prior to procedure per Dr RESHMA Hernandez     ok per Dr Kemp to hold Eliquis 2 days prior to procedure (see telephone encounter dated-2/7/18)    HERNIA REPAIR      HYSTERECTOMY  partial    1982 partial hysterectomy    LEFT HEART CATHETERIZATION Right 11/5/2019    Procedure: Left heart cath;  Surgeon: Jack Capone MD;  Location: [facility] CATH LAB;  Service: Cardiology;  Laterality: Right;    left nasal polyp      left neck lymph node      nose polyp      right hip fatty mass tissue      stent to small intestine      SUPERIOR VENA CAVA ANGIOPLASTY / STENTING      TONSILLECTOMY      TOTAL ABDOMINAL HYSTERECTOMY      2014    TOTAL KNEE ARTHROPLASTY Bilateral     TREATMENT OF CARDIAC ARRHYTHMIA N/A 2/10/2020    Procedure: CARDIOVERSION;  Surgeon: Jayy Parrish MD;  Location: [facility] EP LAB;  Service: Cardiology;  Laterality: N/A;  AF, PEDRO, DCCV, MAC, DM, 3 Prep    TREATMENT OF CARDIAC ARRHYTHMIA N/A 5/15/2020    Procedure:  CARDIOVERSION;  Surgeon: Hany Bee MD;  Location: [facility] LAB;  Service: Cardiology;  Laterality: N/A;  AF, PEDRO, DCCV, MAC, DM, 3 Prep    UPPER GASTROINTESTINAL ENDOSCOPY  2014     Allergies:     Review of patient's allergies indicates:   Allergen Reactions    Celebrex [celecoxib] Shortness Of Breath    Ciprofloxacin Swelling     lip    Dicyclomine Hives    Adhesive Dermatitis    Avelox [moxifloxacin] Swelling    Cilostazol Other (See Comments)     Elevates blood pressure    Eryc [erythromycin] Other (See Comments)     unknown    Iodine and iodide containing products Hives    Keflex [cephalexin] Hives    Meclomen      rashes    Meloxicam      Ear ringing    Metoclopramide Other (See Comments)     High blood pressure    Sulfa (sulfonamide antibiotics) Itching     Medications:     Current Outpatient Medications on File Prior to Visit   Medication Sig Dispense Refill    acetaminophen (TYLENOL) 650 MG TbSR Take 1,300 mg by mouth 2 (two) times daily as needed.      albuterol-ipratropium (DUO-NEB) 2.5 mg-0.5 mg/3 mL nebulizer solution Take 3 mLs by nebulization every 4 (four) hours as needed for Wheezing or Shortness of Breath.      apixaban (ELIQUIS) 5 mg Tab TAKE 1 TABLET BY MOUTH TWICE A  tablet 3    ascorbic acid, vitamin C, (VITAMIN C) 500 MG tablet Take 1,000 mg by mouth once daily.       atorvastatin (LIPITOR) 10 MG tablet Take 1 tablet (10 mg total) by mouth once daily. 30 tablet 0    calcium carbonate/vitamin D3 (CALTRATE 600 + D ORAL) Take 1 tablet by mouth once daily.      carvediloL (COREG) 12.5 MG tablet Take 1 tablet (12.5 mg total) by mouth 2 (two) times daily. 180 tablet 3    clopidogreL (PLAVIX) 75 mg tablet TAKE 1 TABLET BY MOUTH EVERY DAY 90 tablet 3    diltiaZEM (CARDIZEM CD) 240 MG 24 hr capsule TAKE 1 CAPSULE BY MOUTH ONCE A DAY 90 capsule 3    DULoxetine (CYMBALTA) 20 MG capsule Take 1 capsule (20 mg total) by mouth once daily. 30 capsule 0    empagliflozin (JARDIANCE) 10 mg tablet  Take 1 tablet (10 mg total) by mouth once daily. 30 tablet 11    fexofenadine (ALLEGRA) 60 MG tablet Take 60 mg by mouth once daily.      furosemide (LASIX) 40 MG tablet Take 40mg (1 tablet) in the morning and 20mg (1/2 tablet) in the afternoon 60 tablet 11    gabapentin (NEURONTIN) 600 MG tablet Take 600 mg by mouth 3 (three) times daily.      isosorbide mononitrate (IMDUR) 60 MG 24 hr tablet Take 1 tablet (60 mg total) by mouth once daily. 30 tablet 0    LORazepam (ATIVAN) 0.5 MG tablet Take 1 tablet (0.5 mg total) by mouth every morning. 30 tablet 2    losartan (COZAAR) 50 MG Tab Take 1 tablet (50 mg total) by mouth once daily. 30 tablet 11    metFORMIN (GLUCOPHAGE) 500 MG tablet Take 1 tablet (500 mg total) by mouth 2 (two) times daily. 60 tablet 0    montelukast (SINGULAIR) 10 mg tablet Take 10 mg by mouth once daily.      mv-mn/folic ac/calcium/vit K1 (WOMEN'S 50 PLUS MULTIVITAMIN ORAL) Take 1 tablet by mouth once daily.      nitroGLYCERIN (NITROSTAT) 0.4 MG SL tablet Place 1 tablet (0.4 mg total) under the tongue every 5 (five) minutes as needed for Chest pain. 25 tablet PRN    nystatin-triamcinolone (MYCOLOG) ointment Apply topically 3 (three) times daily.      oxybutynin (DITROPAN-XL) 5 MG TR24 Take 1 tablet (5 mg total) by mouth every other day. 15 tablet 0    pantoprazole (PROTONIX) 40 MG tablet Take 1 tablet (40 mg total) by mouth once daily. 90 tablet 3    phenazopyridine HCl (PYRIDIUM ORAL) Take 1 tablet by mouth 3 (three) times daily as needed.      POLYSORBATE 80/GLYCERIN (REFRESH DRY EYE THERAPY OPHT) Apply to eye 2 (two) times daily.      potassium gluconate 550 mg (90 mg) Tab Take 180 mg by mouth 2 (two) times a day.      ranolazine (RANEXA) 1,000 mg Tb12 Take 1 tablet (1,000 mg total) by mouth 2 (two) times daily. 180 tablet 3    SYMBICORT 160-4.5 mcg/actuation HFAA Inhale 1 puff into the lungs 2 (two) times daily.      vitamin D (VITAMIN D3) 1000 units Tab Take 2,000 Units by mouth once daily.    "   ZINC ORAL Take 1 tablet by mouth once daily.      fish oil-omega-3 fatty acids 300-1,000 mg capsule Take 1 g by mouth once daily.        Current Facility-Administered Medications on File Prior to Visit   Medication Dose Route Frequency Provider Last Rate Last Admin    0.9%  NaCl infusion   Intravenous Continuous Lynette Hightower NP   New Bag at 02/10/20 1029    sodium chloride 0.9% flush 5 mL  5 mL Intravenous PRN Lynette Hightower NP         Social History:     Social History     Tobacco Use    Smoking status: Never    Smokeless tobacco: Never   Substance Use Topics    Alcohol use: No     Family History:     Family History   Problem Relation Age of Onset    Heart attack Mother     Stroke Father     Heart failure Brother     Colon cancer Neg Hx     Inflammatory bowel disease Neg Hx      Physical Exam:   /60   Pulse 70   Ht 5' 4" (1.626 m)   Wt 102.7 kg (226 lb 6.6 oz)   LMP 01/21/1973 (LMP Unknown)   SpO2 (!) 94%   BMI 38.86 kg/m²    Wt Readings from Last 4 Encounters:   02/09/24 102.7 kg (226 lb 6.6 oz)   01/02/24 104.3 kg (230 lb)   01/02/24 104.5 kg (230 lb 7.9 oz)   12/18/23 103 kg (227 lb)         Constitutional: No distress, conversant  HEENT: Sclera anicteric, PERRLA, EOMI  Neck: JVD (12cm) to earlobe at 45 degrees, no masses, good movement  CV: RRR, S1 and S2 normal, ejection murmur in aortic area, s3 & s4. Pulses 2+ and equal bilaterally in radial arteries, Distal pulses are 2+ and equal in the femoral, DP and PT areas bilaterally  Pulm: wheezing, fine crackles to R lung bases  GI: Abdomen soft, non-tender, good bowel sounds  Extremities: 1-2+ pitting edema to bilateral legs, presacral edema noted.  Skin: No ecchymosis, erythema, aor ulcers  Psych: AOx3, appropriate affect  Neuro: CNII-XII intact, no focal deficits      Labs:     Lab Results   Component Value Date     10/27/2023    K 4.8 10/27/2023    CL 98 10/27/2023    CO2 29 10/27/2023    BUN 20 10/27/2023    CREATININE " 0.9 10/27/2023    ANIONGAP 9 10/27/2023     Lab Results   Component Value Date    HGBA1C 5.3 09/21/2023     Lab Results   Component Value Date     (H) 10/27/2023     (H) 10/17/2023     (H) 10/02/2023    Lab Results   Component Value Date    WBC 7.60 10/02/2023    HGB 11.6 (L) 10/02/2023    HCT 37.4 10/02/2023    HCT 42 11/26/2019     10/02/2023    GRAN 5.4 10/02/2023    GRAN 70.8 10/02/2023     Lab Results   Component Value Date    CHOL 149 03/22/2023    HDL 63 03/22/2023    LDLCALC 62.4 (L) 03/22/2023    TRIG 118 03/22/2023          Imaging:    Echo  EF   Date Value Ref Range Status   10/01/2022 55 % Final   08/11/2022 55 % Final     Nuc Stress EF   Date Value Ref Range Status   10/27/2023 67 % Final   09/14/2022 64 % Final     Nuc Rest EF   Date Value Ref Range Status   10/27/2023 67  Final   09/14/2022 64  Final       Assessment:    78F pmhx HLD, HTN, DM2, s/p TAVR (2019), CAD (pre TAVR 70% iLAD lesion), HFpEF, atrial fibrillation, PPM (placed at osh for bradycardia in 2021, intermittently VVI paced) here for 2 month follow up for shortness of breath. Presentation concerning for HF exacerbation vs wheezing     Plan:     1. Chronic heart failure with preserved ejection fraction    2. S/P TAVR (transcatheter aortic valve replacement)    3. Chronic atrial fibrillation    4. Coronary artery disease involving native coronary artery of native heart with angina pectoris with documented spasm    5. Hyperlipidemia, unspecified hyperlipidemia type    6. Essential hypertension    7. Chronic obstructive pulmonary disease, unspecified COPD type      HFpEF  -9/2023 EF 55%, grade 3 DD, 26mm porcine claribel s3 and pablo valve with normal measurements. (2019 TAVR)  -Currently on lasix 40 and 20mg daily, coreg 12.5mg, imdur 60mg daily, losartan 50mg daily, jardiance 10mg daily, diltiazem 240mg daily  -PYP scan and fat pad biopsy negative for amyloid, evaluated by Hematology.  -Patient with worsening  symptoms of SOB, hypervolemia (JVD, extremity swelling, crackles). May be superimposed with COPD exacerbation (wheezing).  -Evaluate in ED for diuresis and COPD management. Recommend admission to medicine.    Persistent Atrial fibrillation  PPM  -Patient with persistent afib. ERAF after DCCVs; ablation not pursued as patient had cardiac symptoms even while in normal sinus rhythm. Failed amiodarone, using diltiazem and coreg. No prior ablations.   -PPM was placed in 2021 for symptomatic bradycardia at outside hospital. TAVR was placed in 2019. EKG shows periods of slow afib that is PM dependent at 68 BPM.  -Continue eliquis 5mg  -Continue coreg and diltiazem    HTN  -Today /60, HR 70  -no recent BMP  -Continue home coreg, dilt, imdur, losartan    CAD  HLD  DM2  -Long tubular 70% LAD, with larger branching diagonal. Pursuing medical management  -3/2023, 62.4 LDL, 5.3 A1c   -Repeat lipid Panel, hgb a1c  -Continue metformin and atorva 10mg  -Continue antianginals and ranolazine    Recommend admission to medicine and post hospitalization follow up in general cardiology clinic.    Signed:  Harley Espino M.D.  Cardiology Fellow  [facility]  2/9/2024 2:25 PM

## 2024-02-09 NOTE — Clinical Note
The catheter was inserted into the ostium   right coronary artery. An angiography was performed of the right coronary arteries. Multiple views were taken.  Catheter removed

## 2024-02-09 NOTE — PROGRESS NOTES
I have reviewed the patient's chart and the fellow's clinic note, as well as discussed the case with the fellow. I have personally spoken with and examined the patient in the clinic. I agree with the findings, assessment, and plan. Patient with significant progressive decrease in exercise tolerance and dyspnea.  Exam shows below knee edema, no rales, but diffuse wheezing on forced exhalation, +S3 and S4 with 2/6 systolic murmur at URSB, and no significant JVD but +HJR.  Findings may be worsening heart failure but also compatible with worsening pulmonary status.  Will send for hospital admission as patient needs close follow-up as therapy is initiated.     Nate Landis MD  Consultative Cardiology - [facility]  .

## 2024-02-09 NOTE — Clinical Note
The catheter was inserted into the ostium   left main. An angiography was performed of the left coronary arteries. Multiple views were taken.  Catheter removed

## 2024-02-10 LAB
ANION GAP SERPL CALC-SCNC: 11 MMOL/L (ref 8–16)
ASCENDING AORTA: 2.76 CM
AV INDEX (PROSTH): 0.3
AV MEAN GRADIENT: 11 MMHG
AV PEAK GRADIENT: 17 MMHG
AV VALVE AREA BY VELOCITY RATIO: 1.07 CM²
AV VALVE AREA: 1.03 CM²
AV VELOCITY RATIO: 0.31
BASOPHILS # BLD AUTO: 0.04 K/UL (ref 0–0.2)
BASOPHILS NFR BLD: 0.6 % (ref 0–1.9)
BSA FOR ECHO PROCEDURE: 2.15 M2
BUN SERPL-MCNC: 19 MG/DL (ref 8–23)
CALCIUM SERPL-MCNC: 8.9 MG/DL (ref 8.7–10.5)
CHLORIDE SERPL-SCNC: 98 MMOL/L (ref 95–110)
CO2 SERPL-SCNC: 31 MMOL/L (ref 23–29)
CREAT SERPL-MCNC: 0.8 MG/DL (ref 0.5–1.4)
CV ECHO LV RWT: 0.53 CM
DIFFERENTIAL METHOD BLD: ABNORMAL
DOP CALC AO PEAK VEL: 2.07 M/S
DOP CALC AO VTI: 54.9 CM
DOP CALC LVOT AREA: 3.5 CM2
DOP CALC LVOT DIAMETER: 2.1 CM
DOP CALC LVOT PEAK VEL: 0.64 M/S
DOP CALC LVOT STROKE VOLUME: 56.29 CM3
DOP CALCLVOT PEAK VEL VTI: 16.26 CM
E WAVE DECELERATION TIME: 113.04 MSEC
E/A RATIO: 2.33
E/E' RATIO: 16.15 M/S
ECHO LV POSTERIOR WALL: 1.21 CM (ref 0.6–1.1)
EJECTION FRACTION: 35 %
EOSINOPHIL # BLD AUTO: 0 K/UL (ref 0–0.5)
EOSINOPHIL NFR BLD: 0.4 % (ref 0–8)
ERYTHROCYTE [DISTWIDTH] IN BLOOD BY AUTOMATED COUNT: 15.8 % (ref 11.5–14.5)
EST. GFR  (NO RACE VARIABLE): >60 ML/MIN/1.73 M^2
FRACTIONAL SHORTENING: 35 % (ref 28–44)
GLUCOSE SERPL-MCNC: 95 MG/DL (ref 70–110)
HCT VFR BLD AUTO: 38.4 % (ref 37–48.5)
HGB BLD-MCNC: 12.2 G/DL (ref 12–16)
IMM GRANULOCYTES # BLD AUTO: 0.02 K/UL (ref 0–0.04)
IMM GRANULOCYTES NFR BLD AUTO: 0.3 % (ref 0–0.5)
INTERVENTRICULAR SEPTUM: 0.96 CM (ref 0.6–1.1)
IVRT: 117.98 MSEC
LA MAJOR: 5.91 CM
LA MINOR: 5.92 CM
LA WIDTH: 4.59 CM
LEFT ATRIUM SIZE: 3.98 CM
LEFT ATRIUM VOLUME INDEX MOD: 29.4 ML/M2
LEFT ATRIUM VOLUME INDEX: 44.6 ML/M2
LEFT ATRIUM VOLUME MOD: 60.6 CM3
LEFT ATRIUM VOLUME: 91.85 CM3
LEFT INTERNAL DIMENSION IN SYSTOLE: 2.99 CM (ref 2.1–4)
LEFT VENTRICLE DIASTOLIC VOLUME INDEX: 47.17 ML/M2
LEFT VENTRICLE DIASTOLIC VOLUME: 97.17 ML
LEFT VENTRICLE MASS INDEX: 86 G/M2
LEFT VENTRICLE SYSTOLIC VOLUME INDEX: 16.9 ML/M2
LEFT VENTRICLE SYSTOLIC VOLUME: 34.85 ML
LEFT VENTRICULAR INTERNAL DIMENSION IN DIASTOLE: 4.6 CM (ref 3.5–6)
LEFT VENTRICULAR MASS: 177.78 G
LV LATERAL E/E' RATIO: 15 M/S
LV SEPTAL E/E' RATIO: 17.5 M/S
LYMPHOCYTES # BLD AUTO: 2.1 K/UL (ref 1–4.8)
LYMPHOCYTES NFR BLD: 29.3 % (ref 18–48)
MCH RBC QN AUTO: 30.8 PG (ref 27–31)
MCHC RBC AUTO-ENTMCNC: 31.8 G/DL (ref 32–36)
MCV RBC AUTO: 97 FL (ref 82–98)
MONOCYTES # BLD AUTO: 0.8 K/UL (ref 0.3–1)
MONOCYTES NFR BLD: 11.3 % (ref 4–15)
MV PEAK A VEL: 0.45 M/S
MV PEAK E VEL: 1.05 M/S
MV STENOSIS PRESSURE HALF TIME: 32.78 MS
MV VALVE AREA P 1/2 METHOD: 6.71 CM2
NEUTROPHILS # BLD AUTO: 4.1 K/UL (ref 1.8–7.7)
NEUTROPHILS NFR BLD: 58.1 % (ref 38–73)
NRBC BLD-RTO: 0 /100 WBC
OHS QRS DURATION: 150 MS
OHS QRS DURATION: 156 MS
OHS QTC CALCULATION: 480 MS
OHS QTC CALCULATION: 499 MS
PISA TR MAX VEL: 3.3 M/S
PLATELET # BLD AUTO: 222 K/UL (ref 150–450)
PMV BLD AUTO: 11 FL (ref 9.2–12.9)
POCT GLUCOSE: 104 MG/DL (ref 70–110)
POCT GLUCOSE: 109 MG/DL (ref 70–110)
POCT GLUCOSE: 111 MG/DL (ref 70–110)
POCT GLUCOSE: 149 MG/DL (ref 70–110)
POCT GLUCOSE: 211 MG/DL (ref 70–110)
POTASSIUM SERPL-SCNC: 3.8 MMOL/L (ref 3.5–5.1)
RA MAJOR: 4.99 CM
RA PRESSURE ESTIMATED: 3 MMHG
RA WIDTH: 3.67 CM
RBC # BLD AUTO: 3.96 M/UL (ref 4–5.4)
RIGHT VENTRICULAR END-DIASTOLIC DIMENSION: 3.14 CM
RV TB RVSP: 6 MMHG
SINUS: 2.54 CM
SODIUM SERPL-SCNC: 140 MMOL/L (ref 136–145)
STJ: 2.18 CM
TDI LATERAL: 0.07 M/S
TDI SEPTAL: 0.06 M/S
TDI: 0.07 M/S
TR MAX PG: 44 MMHG
TRICUSPID ANNULAR PLANE SYSTOLIC EXCURSION: 1.21 CM
TV REST PULMONARY ARTERY PRESSURE: 47 MMHG
WBC # BLD AUTO: 6.99 K/UL (ref 3.9–12.7)
Z-SCORE OF LEFT VENTRICULAR DIMENSION IN END DIASTOLE: -3.02
Z-SCORE OF LEFT VENTRICULAR DIMENSION IN END SYSTOLE: -1.91

## 2024-02-10 PROCEDURE — 94761 N-INVAS EAR/PLS OXIMETRY MLT: CPT

## 2024-02-10 PROCEDURE — G0378 HOSPITAL OBSERVATION PER HR: HCPCS

## 2024-02-10 PROCEDURE — 63600175 PHARM REV CODE 636 W HCPCS: Performed by: FAMILY MEDICINE

## 2024-02-10 PROCEDURE — 85025 COMPLETE CBC W/AUTO DIFF WBC: CPT | Performed by: FAMILY MEDICINE

## 2024-02-10 PROCEDURE — 25000003 PHARM REV CODE 250: Performed by: FAMILY MEDICINE

## 2024-02-10 PROCEDURE — 36415 COLL VENOUS BLD VENIPUNCTURE: CPT | Performed by: FAMILY MEDICINE

## 2024-02-10 PROCEDURE — 80048 BASIC METABOLIC PNL TOTAL CA: CPT | Performed by: FAMILY MEDICINE

## 2024-02-10 PROCEDURE — 99233 SBSQ HOSP IP/OBS HIGH 50: CPT | Mod: ,,, | Performed by: INTERNAL MEDICINE

## 2024-02-10 RX ADMIN — APIXABAN 5 MG: 5 TABLET, FILM COATED ORAL at 08:02

## 2024-02-10 RX ADMIN — GABAPENTIN 600 MG: 300 CAPSULE ORAL at 02:02

## 2024-02-10 RX ADMIN — APIXABAN 5 MG: 5 TABLET, FILM COATED ORAL at 11:02

## 2024-02-10 RX ADMIN — FUROSEMIDE 80 MG: 10 INJECTION, SOLUTION INTRAVENOUS at 11:02

## 2024-02-10 RX ADMIN — CARVEDILOL 12.5 MG: 12.5 TABLET, FILM COATED ORAL at 11:02

## 2024-02-10 RX ADMIN — GABAPENTIN 600 MG: 300 CAPSULE ORAL at 08:02

## 2024-02-10 RX ADMIN — DILTIAZEM HYDROCHLORIDE 240 MG: 240 CAPSULE, COATED, EXTENDED RELEASE ORAL at 11:02

## 2024-02-10 RX ADMIN — RANOLAZINE 1000 MG: 1000 TABLET, FILM COATED, EXTENDED RELEASE ORAL at 08:02

## 2024-02-10 RX ADMIN — DULOXETINE HYDROCHLORIDE 20 MG: 20 CAPSULE, DELAYED RELEASE ORAL at 11:02

## 2024-02-10 RX ADMIN — LOSARTAN POTASSIUM 50 MG: 50 TABLET, FILM COATED ORAL at 11:02

## 2024-02-10 RX ADMIN — MONTELUKAST 10 MG: 10 TABLET, FILM COATED ORAL at 11:02

## 2024-02-10 RX ADMIN — PANTOPRAZOLE SODIUM 40 MG: 40 TABLET, DELAYED RELEASE ORAL at 11:02

## 2024-02-10 RX ADMIN — CARVEDILOL 12.5 MG: 12.5 TABLET, FILM COATED ORAL at 08:02

## 2024-02-10 RX ADMIN — GABAPENTIN 600 MG: 300 CAPSULE ORAL at 11:02

## 2024-02-10 RX ADMIN — ATORVASTATIN CALCIUM 10 MG: 10 TABLET, FILM COATED ORAL at 11:02

## 2024-02-10 RX ADMIN — ACETAMINOPHEN 1000 MG: 500 TABLET ORAL at 01:02

## 2024-02-10 RX ADMIN — RANOLAZINE 1000 MG: 1000 TABLET, FILM COATED, EXTENDED RELEASE ORAL at 11:02

## 2024-02-10 RX ADMIN — ISOSORBIDE MONONITRATE 60 MG: 30 TABLET, EXTENDED RELEASE ORAL at 11:02

## 2024-02-10 RX ADMIN — LORAZEPAM 0.5 MG: 0.5 TABLET ORAL at 06:02

## 2024-02-10 RX ADMIN — FUROSEMIDE 80 MG: 10 INJECTION, SOLUTION INTRAVENOUS at 08:02

## 2024-02-10 RX ADMIN — CLOPIDOGREL BISULFATE 75 MG: 75 TABLET ORAL at 11:02

## 2024-02-10 NOTE — ASSESSMENT & PLAN NOTE
- current symptoms felt more secondary to CHF over COPD exacerbation. Though potentially progressive chronic COPD changes without acute exacerbation may also be etiology  - previously on home 2L O2 after 6min walk test  - currently saturating well on 4L O2  - xopenex x1 in ED  - Duonebs q4h prn  - continue home LABA-ICS and symbicort   Concentration Of Solution Injected (Mg/Ml): 3.0 Administered By (Optional): EL Consent: The risks of atrophy were reviewed with the patient. Ndc# For Kenalog Only: [id number] Detail Level: Detailed Medical Necessity Clause: This procedure was medically necessary because the lesions that were treated were: Lot # (Optional): [id number] X Size Of Lesion In Cm (Optional): 0 Include Z78.9 (Other Specified Conditions Influencing Health Status) As An Associated Diagnosis?: No Kenalog Preparation: Kenalog Total Volume Injected (Ccs- Only Use Numbers And Decimals): 0.2

## 2024-02-10 NOTE — ED PROVIDER NOTES
"Encounter Date: 2/9/2024       History     Chief Complaint   Patient presents with    Shortness of Breath     Sent for admission     79 year old female referred to the ED from Cardiology Clinic for admission for CHF exacerbation and fluid overload.  See below from Cards:  "Patient with significant progressive decrease in exercise tolerance and dyspnea.  Exam shows below knee edema, no rales, but diffuse wheezing on forced exhalation, +S3 and S4 with 2/6 systolic murmur at URSB, and no significant JVD but +HJR.  Findings may be worsening heart failure but also compatible with worsening pulmonary status.  Will send for hospital admission as patient needs close follow-up as therapy is initiated."    PMHx - COPD, HFpEF, Afib with PPM on eliquis, HTN, DM2, mesenteric ischemia.   Upon my initial assessment of the patient she appears to be comfortable.  Vital signs are reassuring, BP is slightly elevated.  Satting 100% on room air.      Review of patient's allergies indicates:   Allergen Reactions    Celebrex [celecoxib] Shortness Of Breath    Ciprofloxacin Swelling     lip    Dicyclomine Hives    Adhesive Dermatitis    Avelox [moxifloxacin] Swelling    Cilostazol Other (See Comments)     Elevates blood pressure    Eryc [erythromycin] Other (See Comments)     unknown    Iodine and iodide containing products Hives    Keflex [cephalexin] Hives    Meclomen      rashes    Meloxicam      Ear ringing    Metoclopramide Other (See Comments)     High blood pressure    Sulfa (sulfonamide antibiotics) Itching     Past Medical History:   Diagnosis Date    Acute on chronic diastolic (congestive) heart failure 11/20/2019    Anticoagulant long-term use     on Plavix since May 2015    Anxiety     Arthritis     Asthma     Atrial fibrillation     Atrial fibrillation with RVR 10/19/2020    Cataracts, bilateral     Chest pain, atypical     Chronic atrial fibrillation with rapid ventricular response 6/23/2017    Colon polyp     Coronary artery " disease     Diabetes mellitus     Dry eyes     Esophageal erosions     GERD (gastroesophageal reflux disease)     Heart murmur     Hemorrhoid     High cholesterol     Hypertension     Irritable bowel syndrome     Paroxysmal atrial fibrillation 10/30/2020    Persistent atrial fibrillation 2/10/2020    Shingles 2015    Stenosis of aortic and mitral valves     Stroke     TIA (transient ischemic attack)     Use of cane as ambulatory aid      Past Surgical History:   Procedure Laterality Date    ABDOMINAL SURGERY      stents to SMA    angiocele      2007, 2014    AORTIC VALVE REPLACEMENT N/A 11/19/2019    Procedure: Replacement-valve-aortic;  Surgeon: Jack Capone MD;  Location: [facility] CATH LAB;  Service: Cardiology;  Laterality: N/A;    BREAST SURGERY      left--- a lump--- no cancer    CARDIAC VALVE SURGERY      COLONOSCOPY  2014    COLONOSCOPY N/A 3/14/2018    Procedure: COLONOSCOPY;  Surgeon: Efren Kemp MD;  [facility]  Service: Endoscopy;  Laterality: N/A;  ok to hold Plavix 5 days prior to procedure per Dr RESHMA Hernandez     ok per Dr Kemp to hold Eliquis 2 days prior to procedure (see telephone encounter dated-2/7/18)    HERNIA REPAIR      HYSTERECTOMY  partial    1982 partial hysterectomy    LEFT HEART CATHETERIZATION Right 11/5/2019    Procedure: Left heart cath;  Surgeon: Jack Capone MD;  Location: [facility] CATH LAB;  Service: Cardiology;  Laterality: Right;    left nasal polyp      left neck lymph node      nose polyp      right hip fatty mass tissue      stent to small intestine      SUPERIOR VENA CAVA ANGIOPLASTY / STENTING      TONSILLECTOMY      TOTAL ABDOMINAL HYSTERECTOMY      2014    TOTAL KNEE ARTHROPLASTY Bilateral     TREATMENT OF CARDIAC ARRHYTHMIA N/A 2/10/2020    Procedure: CARDIOVERSION;  Surgeon: Jayy Parrish MD;  Location: [facility] EP LAB;  Service: Cardiology;  Laterality: N/A;  AF, PEDRO, DCCV, MAC, DM, 3 Prep    TREATMENT OF CARDIAC ARRHYTHMIA N/A 5/15/2020    Procedure:  CARDIOVERSION;  Surgeon: Hany Bee MD;  Location: [facility] EP LAB;  Service: Cardiology;  Laterality: N/A;  AF, PEDRO, DCCV, MAC, DM, 3 Prep    UPPER GASTROINTESTINAL ENDOSCOPY  2014     Family History   Problem Relation Age of Onset    Heart attack Mother     Stroke Father     Heart failure Brother     Colon cancer Neg Hx     Inflammatory bowel disease Neg Hx      Social History     Tobacco Use    Smoking status: Never    Smokeless tobacco: Never   Substance Use Topics    Alcohol use: No    Drug use: No     Review of Systems   Constitutional:  Negative for fever.   HENT:  Negative for sore throat.    Respiratory:  Positive for cough and shortness of breath.    Cardiovascular:  Negative for chest pain.   Gastrointestinal:  Negative for nausea.   Genitourinary:  Negative for dysuria.   Musculoskeletal:  Negative for back pain.   Skin:  Negative for rash.   Neurological:  Negative for weakness.   Hematological:  Does not bruise/bleed easily.       Physical Exam     Initial Vitals [02/09/24 1637]   BP Pulse Resp Temp SpO2   (!) 149/67 71 15 98.2 °F (36.8 °C) (!) 94 %      MAP       --         Physical Exam    Constitutional: Vital signs are normal. She appears well-developed and well-nourished.   HENT:   Head: Normocephalic and atraumatic.   Right Ear: Hearing normal.   Left Ear: Hearing normal.   Eyes: Conjunctivae are normal.   Cardiovascular:  Normal rate and regular rhythm.           Bilateral lower extremity edema  HPJ reflex is present   Pulmonary/Chest:   Mild expiratory wheezing noted   Abdominal: Abdomen is soft. Bowel sounds are normal.   Musculoskeletal:         General: Normal range of motion.     Neurological: She is alert and oriented to person, place, and time.   Skin: Skin is warm and intact.   Psychiatric: She has a normal mood and affect. Her speech is normal and behavior is normal. Cognition and memory are normal.         ED Course   Procedures  Labs Reviewed   CBC W/ AUTO DIFFERENTIAL - Abnormal;  Notable for the following components:       Result Value    MCHC 31.8 (*)     RDW 15.8 (*)     All other components within normal limits   COMPREHENSIVE METABOLIC PANEL - Abnormal; Notable for the following components:    BUN 24 (*)     Alkaline Phosphatase 44 (*)     All other components within normal limits   B-TYPE NATRIURETIC PEPTIDE - Abnormal; Notable for the following components:     (*)     All other components within normal limits   TROPONIN I          Imaging Results              X-Ray Chest AP Portable (In process)                      Medications   furosemide injection 80 mg (80 mg Intravenous Given 2/9/24 2011)     Medical Decision Making  79-year-old female to the ER referred from Cardiology Clinic for admission for fluid overload and suspected acute on chronic CHF exacerbation  Differential: CHF exacerbation, electrolyte derangement, ACS, cardiac arrhythmia, pulmonary edema  Plan:  Routine labs reviewed, troponin not elevated.  .  Fluid overloaded clinically on exam.  Will diurese with Lasix 80 mg IV.  I will discuss the case with case management and with Hospital Medicine for admission.      Amount and/or Complexity of Data Reviewed  Radiology: ordered.  ECG/medicine tests: ordered and independent interpretation performed.     Details: No STEMI    Risk  Prescription drug management.                                      Clinical Impression:  Final diagnoses:  [R06.02] SOB (shortness of breath)          ED Disposition Condition    Observation                 Tesfaye Avalos PA-C  02/09/24 2016

## 2024-02-10 NOTE — ED NOTES
Telemetry Verification   Patient placed on Telemetry Box  Verified with War Room  Box # [id number]   Monitor Tech    Rate ns   Rhythm 68

## 2024-02-10 NOTE — HPI
79F pmhx HLD, HTN, DM2, s/p TAVR (2019), CAD (pre TAVR 70% iLAD lesion), HFpEF, atrial fibrillation, PPM, COPD previously on home 2L home O2 but seems to not recently been using who presents to the ED from Cardiology clinic for concern of CHF exacerbation and worsening WHITLEY in setting of COPD. Patient reports she cannot walk across the room without getting SOB. Reports increasing bilateral leg swelling. Needs several pillows and 45 degree incline to sleep. Wheezing and using duonebs prn a couple times a week. Also reports chest tightness that occurs 1-5 minutes at a time, at random, unassociated with exercise nor eating, denies heartburn. Denies palpitations, lightheadedness. Patient reports compliance with diuretics and cardiac meds (lasix 40mg am, 20mg pm). Does not add salt to diet nor eats fried foods.    In the ED patient afebrile and hemodynamically stable saturating well on 2L NC.  and significant bilateral pitting edema. CXR without overt acute process. Patient with bilateral rales and slight wheezing. Started on lasix iv and given xopenex x1. Admitted to the care of medicine for further evaluation and management.

## 2024-02-10 NOTE — H&P
[facility] - Emergency Dept  [facility] Medicine  History & Physical    Patient Name: Adriana Strong  MRN: [id number]  Patient Class: OP- Observation  Admission Date: 2/9/2024  Attending Physician: Guillermina Kumar MD   Primary Care Provider: Krzysztof Mcgraw MD         Patient information was obtained from patient, past medical records, and ER records.     Subjective:     Principal Problem:Acute on chronic heart failure with preserved ejection fraction    Chief Complaint:   Chief Complaint   Patient presents with    Shortness of Breath     Sent for admission        HPI: 79F pmhx HLD, HTN, DM2, s/p TAVR (2019), CAD (pre TAVR 70% iLAD lesion), HFpEF, atrial fibrillation, PPM, COPD previously on home 2L home O2 but seems to not recently been using who presents to the ED from Cardiology clinic for concern of CHF exacerbation and worsening WHITLEY in setting of COPD. Patient reports she cannot walk across the room without getting SOB. Reports increasing bilateral leg swelling. Needs several pillows and 45 degree incline to sleep. Wheezing and using duonebs prn a couple times a week. Also reports chest tightness that occurs 1-5 minutes at a time, at random, unassociated with exercise nor eating, denies heartburn. Denies palpitations, lightheadedness. Patient reports compliance with diuretics and cardiac meds (lasix 40mg am, 20mg pm). Does not add salt to diet nor eats fried foods.    In the ED patient afebrile and hemodynamically stable saturating well on 2L NC.  and significant bilateral pitting edema. CXR without overt acute process. Patient with bilateral rales and slight wheezing. Started on lasix iv and given xopenex x1. Admitted to the care of medicine for further evaluation and management.     Past Medical History:   Diagnosis Date    Acute on chronic diastolic (congestive) heart failure 11/20/2019    Anticoagulant long-term use     on Plavix since May 2015    Anxiety     Arthritis     Asthma     Atrial  fibrillation     Atrial fibrillation with RVR 10/19/2020    Cataracts, bilateral     Chest pain, atypical     Chronic atrial fibrillation with rapid ventricular response 6/23/2017    Colon polyp     Coronary artery disease     Diabetes mellitus     Dry eyes     Esophageal erosions     GERD (gastroesophageal reflux disease)     Heart murmur     Hemorrhoid     High cholesterol     Hypertension     Irritable bowel syndrome     Paroxysmal atrial fibrillation 10/30/2020    Persistent atrial fibrillation 2/10/2020    Shingles 2015    Stenosis of aortic and mitral valves     Stroke     TIA (transient ischemic attack)     Use of cane as ambulatory aid        Past Surgical History:   Procedure Laterality Date    ABDOMINAL SURGERY      stents to SMA    angiocele      2007, 2014    AORTIC VALVE REPLACEMENT N/A 11/19/2019    Procedure: Replacement-valve-aortic;  Surgeon: Jack Capone MD;  Location: [facility] CATH LAB;  Service: Cardiology;  Laterality: N/A;    BREAST SURGERY      left--- a lump--- no cancer    CARDIAC VALVE SURGERY      COLONOSCOPY  2014    COLONOSCOPY N/A 3/14/2018    Procedure: COLONOSCOPY;  Surgeon: Efren Kemp MD;  Location: [facility] ENDO ([address] [place] [address]);  Service: Endoscopy;  Laterality: N/A;  ok to hold Plavix 5 days prior to procedure per Dr RESHMA Hernandez     ok per Dr Kemp to hold Eliquis 2 days prior to procedure (see telephone encounter dated-2/7/18)    HERNIA REPAIR      HYSTERECTOMY  partial    1982 partial hysterectomy    LEFT HEART CATHETERIZATION Right 11/5/2019    Procedure: Left heart cath;  Surgeon: Jack Capone MD;  Location: [facility] CATH LAB;  Service: Cardiology;  Laterality: Right;    left nasal polyp      left neck lymph node      nose polyp      right hip fatty mass tissue      stent to small intestine      SUPERIOR VENA CAVA ANGIOPLASTY / STENTING      TONSILLECTOMY      TOTAL ABDOMINAL HYSTERECTOMY      2014    TOTAL KNEE ARTHROPLASTY Bilateral     TREATMENT OF CARDIAC ARRHYTHMIA N/A 2/10/2020     Procedure: CARDIOVERSION;  Surgeon: Jayy Parrish MD;  Location: [facility] EP LAB;  Service: Cardiology;  Laterality: N/A;  AF, PEDRO, DCCV, MAC, DM, 3 Prep    TREATMENT OF CARDIAC ARRHYTHMIA N/A 5/15/2020    Procedure: CARDIOVERSION;  Surgeon: Hany Bee MD;  Location: [facility] EP LAB;  Service: Cardiology;  Laterality: N/A;  AF, PEDRO, DCCV, MAC, DM, 3 Prep    UPPER GASTROINTESTINAL ENDOSCOPY  2014       Review of patient's allergies indicates:   Allergen Reactions    Celebrex [celecoxib] Shortness Of Breath    Ciprofloxacin Swelling     lip    Dicyclomine Hives    Adhesive Dermatitis    Avelox [moxifloxacin] Swelling    Cilostazol Other (See Comments)     Elevates blood pressure    Eryc [erythromycin] Other (See Comments)     unknown    Iodine and iodide containing products Hives    Keflex [cephalexin] Hives    Meclomen      rashes    Meloxicam      Ear ringing    Metoclopramide Other (See Comments)     High blood pressure    Sulfa (sulfonamide antibiotics) Itching       Current Facility-Administered Medications on File Prior to Encounter   Medication    0.9%  NaCl infusion    sodium chloride 0.9% flush 5 mL     Current Outpatient Medications on File Prior to Encounter   Medication Sig    acetaminophen (TYLENOL) 650 MG TbSR Take 1,300 mg by mouth 2 (two) times daily as needed.    albuterol-ipratropium (DUO-NEB) 2.5 mg-0.5 mg/3 mL nebulizer solution Take 3 mLs by nebulization every 4 (four) hours as needed for Wheezing or Shortness of Breath.    apixaban (ELIQUIS) 5 mg Tab TAKE 1 TABLET BY MOUTH TWICE A DAY    ascorbic acid, vitamin C, (VITAMIN C) 500 MG tablet Take 1,000 mg by mouth once daily.     atorvastatin (LIPITOR) 10 MG tablet Take 1 tablet (10 mg total) by mouth once daily.    calcium carbonate/vitamin D3 (CALTRATE 600 + D ORAL) Take 1 tablet by mouth once daily.    carvediloL (COREG) 12.5 MG tablet Take 1 tablet (12.5 mg total) by mouth 2 (two) times daily.    clopidogreL (PLAVIX) 75 mg tablet TAKE 1 TABLET  BY MOUTH EVERY DAY    diltiaZEM (CARDIZEM CD) 240 MG 24 hr capsule TAKE 1 CAPSULE BY MOUTH ONCE A DAY    DULoxetine (CYMBALTA) 20 MG capsule Take 1 capsule (20 mg total) by mouth once daily.    empagliflozin (JARDIANCE) 10 mg tablet Take 1 tablet (10 mg total) by mouth once daily.    fexofenadine (ALLEGRA) 60 MG tablet Take 60 mg by mouth once daily.    fish oil-omega-3 fatty acids 300-1,000 mg capsule Take 1 g by mouth once daily.     furosemide (LASIX) 40 MG tablet Take 40mg (1 tablet) in the morning and 20mg (1/2 tablet) in the afternoon    gabapentin (NEURONTIN) 600 MG tablet Take 600 mg by mouth 3 (three) times daily.    isosorbide mononitrate (IMDUR) 60 MG 24 hr tablet Take 1 tablet (60 mg total) by mouth once daily.    LORazepam (ATIVAN) 0.5 MG tablet Take 1 tablet (0.5 mg total) by mouth every morning.    losartan (COZAAR) 50 MG Tab Take 1 tablet (50 mg total) by mouth once daily.    metFORMIN (GLUCOPHAGE) 500 MG tablet Take 1 tablet (500 mg total) by mouth 2 (two) times daily.    montelukast (SINGULAIR) 10 mg tablet Take 10 mg by mouth once daily.    mv-mn/folic ac/calcium/vit K1 (WOMEN'S 50 PLUS MULTIVITAMIN ORAL) Take 1 tablet by mouth once daily.    nitroGLYCERIN (NITROSTAT) 0.4 MG SL tablet Place 1 tablet (0.4 mg total) under the tongue every 5 (five) minutes as needed for Chest pain.    nystatin-triamcinolone (MYCOLOG) ointment Apply topically 3 (three) times daily.    oxybutynin (DITROPAN-XL) 5 MG TR24 Take 1 tablet (5 mg total) by mouth every other day.    pantoprazole (PROTONIX) 40 MG tablet Take 1 tablet (40 mg total) by mouth once daily.    phenazopyridine HCl (PYRIDIUM ORAL) Take 1 tablet by mouth 3 (three) times daily as needed.    POLYSORBATE 80/GLYCERIN (REFRESH DRY EYE THERAPY OPHT) Apply to eye 2 (two) times daily.    potassium gluconate 550 mg (90 mg) Tab Take 180 mg by mouth 2 (two) times a day.    ranolazine (RANEXA) 1,000 mg Tb12 Take 1 tablet (1,000 mg total) by mouth 2 (two) times  daily.    SYMBICORT 160-4.5 mcg/actuation HFAA Inhale 1 puff into the lungs 2 (two) times daily.    vitamin D (VITAMIN D3) 1000 units Tab Take 2,000 Units by mouth once daily.    ZINC ORAL Take 1 tablet by mouth once daily.     Family History       Problem Relation (Age of Onset)    Heart attack Mother    Heart failure Brother    Stroke Father          Tobacco Use    Smoking status: Never    Smokeless tobacco: Never   Substance and Sexual Activity    Alcohol use: No    Drug use: No    Sexual activity: Not Currently     Review of Systems   Constitutional:  Positive for fatigue. Negative for chills and fever.   HENT:  Negative for sore throat and trouble swallowing.    Eyes:  Negative for photophobia and visual disturbance.   Respiratory:  Positive for cough, shortness of breath and wheezing.    Cardiovascular:  Positive for leg swelling. Negative for chest pain and palpitations.   Gastrointestinal:  Negative for abdominal distention, abdominal pain, diarrhea, nausea and vomiting.   Genitourinary:  Negative for dysuria and hematuria.   Musculoskeletal:  Negative for neck pain and neck stiffness.   Skin:  Negative for rash and wound.   Neurological:  Positive for weakness. Negative for seizures, syncope, light-headedness, numbness and headaches.   Psychiatric/Behavioral:  Negative for confusion and decreased concentration.      Objective:     Vital Signs (Most Recent):  Temp: 98.2 °F (36.8 °C) (02/09/24 2212)  Pulse: 69 (02/09/24 2248)  Resp: (!) 29 (02/09/24 2248)  BP: (!) 168/74 (02/09/24 2248)  SpO2: 97 % (02/09/24 2248) Vital Signs (24h Range):  Temp:  [97.6 °F (36.4 °C)-98.2 °F (36.8 °C)] 98.2 °F (36.8 °C)  Pulse:  [68-71] 69  Resp:  [15-29] 29  SpO2:  [90 %-100 %] 97 %  BP: (126-199)/() 168/74     Weight: 102.5 kg (226 lb)  Body mass index is 38.79 kg/m².     Physical Exam  Constitutional:       General: She is not in acute distress.     Appearance: She is obese. She is not toxic-appearing or diaphoretic.    HENT:      Head: Normocephalic and atraumatic.      Nose: Nose normal.   Eyes:      General: No scleral icterus.     Extraocular Movements: Extraocular movements intact.      Pupils: Pupils are equal, round, and reactive to light.   Cardiovascular:      Rate and Rhythm: Normal rate. Rhythm irregular.   Pulmonary:      Effort: Pulmonary effort is normal. No respiratory distress.      Breath sounds: Wheezing and rales present.   Abdominal:      General: Abdomen is flat. There is no distension.      Palpations: Abdomen is soft.      Tenderness: There is no abdominal tenderness. There is no guarding.   Musculoskeletal:         General: Normal range of motion.      Cervical back: Normal range of motion and neck supple. No rigidity.      Right lower leg: Edema present.      Left lower leg: Edema present.   Skin:     General: Skin is warm and dry.      Coloration: Skin is not jaundiced.   Neurological:      General: No focal deficit present.      Mental Status: She is alert and oriented to person, place, and time.      Cranial Nerves: No cranial nerve deficit.   Psychiatric:         Mood and Affect: Mood normal.         Behavior: Behavior normal.              CRANIAL NERVES     CN III, IV, VI   Pupils are equal, round, and reactive to light.       Significant Labs: All pertinent labs within the past 24 hours have been reviewed.  CBC:   Recent Labs   Lab 02/09/24  1708   WBC 7.02   HGB 12.8   HCT 40.3        CMP:   Recent Labs   Lab 02/09/24  1708      K 5.0   CL 98   CO2 29   GLU 89   BUN 24*   CREATININE 0.8   CALCIUM 9.2   PROT 7.4   ALBUMIN 3.7   BILITOT 0.3   ALKPHOS 44*   AST 31   ALT 14   ANIONGAP 10     Cardiac Markers:   Recent Labs   Lab 02/09/24  1708   *       Significant Imaging: I have reviewed all pertinent imaging results/findings within the past 24 hours.  Assessment/Plan:     * Acute on chronic heart failure with preserved ejection fraction  - IP CHF Pathway initiated  - start lasix  80mg iv BID  - strict I/Os  - daily weights  - low sodium fluid restricted diet  - follow up B/L lower extremity US  - ECHO pending  - continue home plavix, and statin  - monitor tele  - further management pending clinical course and future study review        - Latest ECHO performed and demonstrates- Results for orders placed during the hospital encounter of 09/21/23    Echo    Interpretation Summary    Left Ventricle: The left ventricle is normal in size. Increased wall thickness. Septal motion is consistent with bundle branch block. There is normal systolic function with a visually estimated ejection fraction of 55 - 60%. Grade III diastolic dysfunction.    Right Ventricle: Normal right ventricular cavity size. Wall thickness is normal. Right ventricle wall motion  is normal. Systolic function is normal. Pacemaker lead present in the ventricle.    Left Atrium: Left atrium is severely dilated.    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 It is reported to be a 26 mm Suzi S3 and Phan valve. Aortic valve area by VTI is 1.40 cm². Aortic valve peak velocity is 2.47 m/s. Mean gradient is 13 mmHg. The dimensionless index is 0.38. There is trace aortic regurgitation.    Pulmonary Artery: The estimated pulmonary artery systolic pressure is 44 mmHg.    IVC/SVC: Intermediate venous pressure at 8 mmHg.    S/P TAVR (transcatheter aortic valve replacement)  - continue home meds      COPD (chronic obstructive pulmonary disease)  - current symptoms felt more secondary to CHF over COPD exacerbation. Though potentially progressive chronic COPD changes without acute exacerbation may also be etiology  - previously on home 2L O2 after 6min walk test  - currently saturating well on 4L O2  - xopenex x1 in ED  - Duonebs q4h prn  - continue home LABA-ICS and symbicort    Persistent atrial fibrillation  - continue home Eliquis  - continue home coreg and diltiazem    Essential hypertension  -  continue home meds coreg, losartan, diltiazem    Type 2 diabetes mellitus  - SSI  - hypoglycemic protocol      VTE Risk Mitigation (From admission, onward)           Ordered     apixaban tablet 5 mg  2 times daily         02/09/24 2110                       On 02/09/2024, patient should be placed in hospital observation services under my care.             Gene Elizondo MD  Department of Hospital Medicine  [facility] - Emergency Dept

## 2024-02-10 NOTE — ASSESSMENT & PLAN NOTE
- IP CHF Pathway initiated  - start lasix 80mg iv BID  - strict I/Os  - daily weights  - low sodium fluid restricted diet  - follow up B/L lower extremity US  - ECHO pending  - continue home plavix, and statin  - monitor tele  - further management pending clinical course and future study review        - Latest ECHO performed and demonstrates- Results for orders placed during the hospital encounter of 09/21/23    Echo    Interpretation Summary    Left Ventricle: The left ventricle is normal in size. Increased wall thickness. Septal motion is consistent with bundle branch block. There is normal systolic function with a visually estimated ejection fraction of 55 - 60%. Grade III diastolic dysfunction.    Right Ventricle: Normal right ventricular cavity size. Wall thickness is normal. Right ventricle wall motion  is normal. Systolic function is normal. Pacemaker lead present in the ventricle.    Left Atrium: Left atrium is severely dilated.    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 It is reported to be a 26 mm Suzi S3 and Phan valve. Aortic valve area by VTI is 1.40 cm². Aortic valve peak velocity is 2.47 m/s. Mean gradient is 13 mmHg. The dimensionless index is 0.38. There is trace aortic regurgitation.    Pulmonary Artery: The estimated pulmonary artery systolic pressure is 44 mmHg.    IVC/SVC: Intermediate venous pressure at 8 mmHg.

## 2024-02-10 NOTE — PROGRESS NOTES
[facility] - Stepdown Flex ([place] [address])  [facility] Medicine  Progress Note    Patient Name: Adriana Strong  MRN: [id number]  Patient Class: OP- Observation   Admission Date: 2/9/2024  Length of Stay: 0 days  Attending Physician: Roberta Garcia MD  Primary Care Provider: Krzysztof Mcgraw MD        Subjective:     Principal Problem:Acute on chronic heart failure with preserved ejection fraction        HPI:  79F pmhx HLD, HTN, DM2, s/p TAVR (2019), CAD (pre TAVR 70% iLAD lesion), HFpEF, atrial fibrillation, PPM, COPD previously on home 2L home O2 but seems to not recently been using who presents to the ED from Cardiology clinic for concern of CHF exacerbation and worsening WHITLEY in setting of COPD. Patient reports she cannot walk across the room without getting SOB. Reports increasing bilateral leg swelling. Needs several pillows and 45 degree incline to sleep. Wheezing and using duonebs prn a couple times a week. Also reports chest tightness that occurs 1-5 minutes at a time, at random, unassociated with exercise nor eating, denies heartburn. Denies palpitations, lightheadedness. Patient reports compliance with diuretics and cardiac meds (lasix 40mg am, 20mg pm). Does not add salt to diet nor eats fried foods.    In the ED patient afebrile and hemodynamically stable saturating well on 2L NC.  and significant bilateral pitting edema. CXR without overt acute process. Patient with bilateral rales and slight wheezing. Started on lasix iv and given xopenex x1. Admitted to the care of medicine for further evaluation and management.     Overview/Hospital Course:  Pt admitted for acute on chronic HF, started on iv lasix and O2 per protocol. Echo ordered     Interval History: NAEON, diuresing well, f/u with echo     Review of Systems   Constitutional:  Positive for fatigue. Negative for appetite change.   Respiratory:  Positive for shortness of breath. Negative for cough.    Cardiovascular:  Positive for leg swelling.  Negative for chest pain.   Gastrointestinal:  Negative for abdominal distention, abdominal pain, constipation and diarrhea.   Genitourinary:  Negative for difficulty urinating and dysuria.   Neurological:  Negative for dizziness and headaches.     Objective:     Vital Signs (Most Recent):  Temp: 97.4 °F (36.3 °C) (02/10/24 1133)  Pulse: 69 (02/10/24 1133)  Resp: 18 (02/10/24 1133)  BP: 137/65 (02/10/24 1133)  SpO2: 100 % (02/10/24 1133) Vital Signs (24h Range):  Temp:  [97.4 °F (36.3 °C)-98.2 °F (36.8 °C)] 97.4 °F (36.3 °C)  Pulse:  [68-71] 69  Resp:  [15-29] 18  SpO2:  [90 %-100 %] 100 %  BP: (117-199)/() 137/65     Weight: 102.5 kg (226 lb)  Body mass index is 38.79 kg/m².  No intake or output data in the 24 hours ending 02/10/24 1149      Physical Exam  Constitutional:       Appearance: Normal appearance.   HENT:      Head: Normocephalic and atraumatic.      Mouth/Throat:      Mouth: Mucous membranes are moist.   Cardiovascular:      Rate and Rhythm: Normal rate and regular rhythm.      Pulses: Normal pulses.      Heart sounds: Normal heart sounds.   Pulmonary:      Effort: Pulmonary effort is normal.      Breath sounds: Rales present.   Abdominal:      General: Abdomen is flat. Bowel sounds are normal. There is no distension.      Palpations: Abdomen is soft.      Tenderness: There is no abdominal tenderness.   Musculoskeletal:         General: Normal range of motion.      Cervical back: Normal range of motion and neck supple.      Right lower leg: Edema present.      Left lower leg: Edema present.   Skin:     General: Skin is warm and dry.   Neurological:      General: No focal deficit present.      Mental Status: She is alert and oriented to person, place, and time.   Psychiatric:         Mood and Affect: Mood normal.             Significant Labs: All pertinent labs within the past 24 hours have been reviewed.    Significant Imaging: I have reviewed all pertinent imaging results/findings within the past  24 hours.    Assessment/Plan:      * Acute on chronic heart failure with preserved ejection fraction  - IP CHF Pathway initiated  - continue lasix 80mg iv BID  - strict I/Os  - daily weights  - low sodium fluid restricted diet  - follow up B/L lower extremity US  - ECHO pending  - continue home plavix, and statin  - monitor tele  - further management pending clinical course and future study review        - Latest ECHO performed and demonstrates- Results for orders placed during the hospital encounter of 09/21/23    Echo    Interpretation Summary    Left Ventricle: The left ventricle is normal in size. Increased wall thickness. Septal motion is consistent with bundle branch block. There is normal systolic function with a visually estimated ejection fraction of 55 - 60%. Grade III diastolic dysfunction.    Right Ventricle: Normal right ventricular cavity size. Wall thickness is normal. Right ventricle wall motion  is normal. Systolic function is normal. Pacemaker lead present in the ventricle.    Left Atrium: Left atrium is severely dilated.    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 It is reported to be a 26 mm Suzi S3 and Phan valve. Aortic valve area by VTI is 1.40 cm². Aortic valve peak velocity is 2.47 m/s. Mean gradient is 13 mmHg. The dimensionless index is 0.38. There is trace aortic regurgitation.    Pulmonary Artery: The estimated pulmonary artery systolic pressure is 44 mmHg.    IVC/SVC: Intermediate venous pressure at 8 mmHg.    S/P TAVR (transcatheter aortic valve replacement)  - continue home meds      COPD (chronic obstructive pulmonary disease)  - current symptoms felt more secondary to CHF over COPD exacerbation. Though potentially progressive chronic COPD changes without acute exacerbation may also be etiology  - previously on home 2L O2 after 6min walk test  - currently saturating well on 4L O2  - xopenex x1 in ED  - Duonebs q4h prn  - continue home  LABA-ICS and symbicort    Persistent atrial fibrillation  - continue home Eliquis  - continue home coreg and diltiazem    Essential hypertension  - continue home meds coreg, losartan, diltiazem    Type 2 diabetes mellitus  - SSI  - hypoglycemic protocol      VTE Risk Mitigation (From admission, onward)           Ordered     apixaban tablet 5 mg  2 times daily         02/09/24 2110                    Discharge Planning   MIMI:      Code Status: Full Code   Is the patient medically ready for discharge?:     Reason for patient still in hospital (select all that apply): Treatment                     Roberta Garcia MD  Department of Hospital Medicine   [facility] - Stepdown Flex ([place])

## 2024-02-10 NOTE — ASSESSMENT & PLAN NOTE
- IP CHF Pathway initiated  - continue lasix 80mg iv BID  - strict I/Os  - daily weights  - low sodium fluid restricted diet  - follow up B/L lower extremity US  - ECHO pending  - continue home plavix, and statin  - monitor tele  - further management pending clinical course and future study review        - Latest ECHO performed and demonstrates- Results for orders placed during the hospital encounter of 09/21/23    Echo    Interpretation Summary    Left Ventricle: The left ventricle is normal in size. Increased wall thickness. Septal motion is consistent with bundle branch block. There is normal systolic function with a visually estimated ejection fraction of 55 - 60%. Grade III diastolic dysfunction.    Right Ventricle: Normal right ventricular cavity size. Wall thickness is normal. Right ventricle wall motion  is normal. Systolic function is normal. Pacemaker lead present in the ventricle.    Left Atrium: Left atrium is severely dilated.    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 It is reported to be a 26 mm Suzi S3 and Phan valve. Aortic valve area by VTI is 1.40 cm². Aortic valve peak velocity is 2.47 m/s. Mean gradient is 13 mmHg. The dimensionless index is 0.38. There is trace aortic regurgitation.    Pulmonary Artery: The estimated pulmonary artery systolic pressure is 44 mmHg.    IVC/SVC: Intermediate venous pressure at 8 mmHg.

## 2024-02-10 NOTE — HOSPITAL COURSE
Pt sent to ED from cardio to be admitted for acute on chronic HF and exercise intolerance , started on iv lasix and O2 per protocol. Echo 2/10: Regional wall motion abnormalities present. There is moderately reduced systolic function with a visually estimated ejection fraction of 35 - 40%. Consulted cards. Patient started on GDMT. Rec consulting EP. 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 Changed pacemaker setting. Rec stopping AVN blockers and GDMT since reduced EF seems to be sec to pacemaker induced  HF. Patient has PET stress test on 2/14. IC consulted for transient ischemic dilation on stress test and concern for triple vessel disease. S/p LHC, found to have Non-obstructive CAD. No stents placed. Patient deemed appropriate for d/c. Advised to follow up with EP (Dr. Moeller) in 3-4 weeks. Patient is currently medically and HDS. She is being d/c home. FU with PCP and EP. ER precautions given .Plan of care discussed with patient and family at bedside, verbalized understanding. All questions were answered.

## 2024-02-10 NOTE — ED TRIAGE NOTES
Adriana Strong, a 79 y.o. female presents to the ED w/ complaint of   Triage note:  Chief Complaint   Patient presents with    Shortness of Breath     Sent for admission     Review of patient's allergies indicates:   Allergen Reactions    Celebrex [celecoxib] Shortness Of Breath    Ciprofloxacin Swelling     lip    Dicyclomine Hives    Adhesive Dermatitis    Avelox [moxifloxacin] Swelling    Cilostazol Other (See Comments)     Elevates blood pressure    Eryc [erythromycin] Other (See Comments)     unknown    Iodine and iodide containing products Hives    Keflex [cephalexin] Hives    Meclomen      rashes    Meloxicam      Ear ringing    Metoclopramide Other (See Comments)     High blood pressure    Sulfa (sulfonamide antibiotics) Itching     Past Medical History:   Diagnosis Date    Acute on chronic diastolic (congestive) heart failure 11/20/2019    Anticoagulant long-term use     on Plavix since May 2015    Anxiety     Arthritis     Asthma     Atrial fibrillation     Atrial fibrillation with RVR 10/19/2020    Cataracts, bilateral     Chest pain, atypical     Chronic atrial fibrillation with rapid ventricular response 6/23/2017    Colon polyp     Coronary artery disease     Diabetes mellitus     Dry eyes     Esophageal erosions     GERD (gastroesophageal reflux disease)     Heart murmur     Hemorrhoid     High cholesterol     Hypertension     Irritable bowel syndrome     Paroxysmal atrial fibrillation 10/30/2020    Persistent atrial fibrillation 2/10/2020    Shingles 2015    Stenosis of aortic and mitral valves     Stroke     TIA (transient ischemic attack)     Use of cane as ambulatory aid         No significant past surgical history

## 2024-02-10 NOTE — SUBJECTIVE & OBJECTIVE
Past Medical History:   Diagnosis Date    Acute on chronic diastolic (congestive) heart failure 11/20/2019    Anticoagulant long-term use     on Plavix since May 2015    Anxiety     Arthritis     Asthma     Atrial fibrillation     Atrial fibrillation with RVR 10/19/2020    Cataracts, bilateral     Chest pain, atypical     Chronic atrial fibrillation with rapid ventricular response 6/23/2017    Colon polyp     Coronary artery disease     Diabetes mellitus     Dry eyes     Esophageal erosions     GERD (gastroesophageal reflux disease)     Heart murmur     Hemorrhoid     High cholesterol     Hypertension     Irritable bowel syndrome     Paroxysmal atrial fibrillation 10/30/2020    Persistent atrial fibrillation 2/10/2020    Shingles 2015    Stenosis of aortic and mitral valves     Stroke     TIA (transient ischemic attack)     Use of cane as ambulatory aid        Past Surgical History:   Procedure Laterality Date    ABDOMINAL SURGERY      stents to SMA    angiocele      2007, 2014    AORTIC VALVE REPLACEMENT N/A 11/19/2019    Procedure: Replacement-valve-aortic;  Surgeon: Jack Capone MD;  Location: [facility] CATH LAB;  Service: Cardiology;  Laterality: N/A;    BREAST SURGERY      left--- a lump--- no cancer    CARDIAC VALVE SURGERY      COLONOSCOPY  2014    COLONOSCOPY N/A 3/14/2018    Procedure: COLONOSCOPY;  Surgeon: Efren Kemp MD;  Location: [facility] ENDO ([address] [place] [address]);  Service: Endoscopy;  Laterality: N/A;  ok to hold Plavix 5 days prior to procedure per Dr RESHMA Hernandez     ok per Dr Kemp to hold Eliquis 2 days prior to procedure (see telephone encounter dated-2/7/18)    HERNIA REPAIR      HYSTERECTOMY  partial    1982 partial hysterectomy    LEFT HEART CATHETERIZATION Right 11/5/2019    Procedure: Left heart cath;  Surgeon: Jack Capone MD;  Location: [facility] CATH LAB;  Service: Cardiology;  Laterality: Right;    left nasal polyp      left neck lymph node      nose polyp      right hip fatty mass tissue      stent to small  intestine      SUPERIOR VENA CAVA ANGIOPLASTY / STENTING      TONSILLECTOMY      TOTAL ABDOMINAL HYSTERECTOMY      2014    TOTAL KNEE ARTHROPLASTY Bilateral     TREATMENT OF CARDIAC ARRHYTHMIA N/A 2/10/2020    Procedure: CARDIOVERSION;  Surgeon: Jayy Parrish MD;  Location: [facility] EP LAB;  Service: Cardiology;  Laterality: N/A;  AF, PEDRO, DCCV, MAC, DM, 3 Prep    TREATMENT OF CARDIAC ARRHYTHMIA N/A 5/15/2020    Procedure: CARDIOVERSION;  Surgeon: Hany Bee MD;  Location: [facility] EP LAB;  Service: Cardiology;  Laterality: N/A;  AF, PEDRO, DCCV, MAC, DM, 3 Prep    UPPER GASTROINTESTINAL ENDOSCOPY  2014       Review of patient's allergies indicates:   Allergen Reactions    Celebrex [celecoxib] Shortness Of Breath    Ciprofloxacin Swelling     lip    Dicyclomine Hives    Adhesive Dermatitis    Avelox [moxifloxacin] Swelling    Cilostazol Other (See Comments)     Elevates blood pressure    Eryc [erythromycin] Other (See Comments)     unknown    Iodine and iodide containing products Hives    Keflex [cephalexin] Hives    Meclomen      rashes    Meloxicam      Ear ringing    Metoclopramide Other (See Comments)     High blood pressure    Sulfa (sulfonamide antibiotics) Itching       Current Facility-Administered Medications on File Prior to Encounter   Medication    0.9%  NaCl infusion    sodium chloride 0.9% flush 5 mL     Current Outpatient Medications on File Prior to Encounter   Medication Sig    acetaminophen (TYLENOL) 650 MG TbSR Take 1,300 mg by mouth 2 (two) times daily as needed.    albuterol-ipratropium (DUO-NEB) 2.5 mg-0.5 mg/3 mL nebulizer solution Take 3 mLs by nebulization every 4 (four) hours as needed for Wheezing or Shortness of Breath.    apixaban (ELIQUIS) 5 mg Tab TAKE 1 TABLET BY MOUTH TWICE A DAY    ascorbic acid, vitamin C, (VITAMIN C) 500 MG tablet Take 1,000 mg by mouth once daily.     atorvastatin (LIPITOR) 10 MG tablet Take 1 tablet (10 mg total) by mouth once daily.    calcium carbonate/vitamin  D3 (CALTRATE 600 + D ORAL) Take 1 tablet by mouth once daily.    carvediloL (COREG) 12.5 MG tablet Take 1 tablet (12.5 mg total) by mouth 2 (two) times daily.    clopidogreL (PLAVIX) 75 mg tablet TAKE 1 TABLET BY MOUTH EVERY DAY    diltiaZEM (CARDIZEM CD) 240 MG 24 hr capsule TAKE 1 CAPSULE BY MOUTH ONCE A DAY    DULoxetine (CYMBALTA) 20 MG capsule Take 1 capsule (20 mg total) by mouth once daily.    empagliflozin (JARDIANCE) 10 mg tablet Take 1 tablet (10 mg total) by mouth once daily.    fexofenadine (ALLEGRA) 60 MG tablet Take 60 mg by mouth once daily.    fish oil-omega-3 fatty acids 300-1,000 mg capsule Take 1 g by mouth once daily.     furosemide (LASIX) 40 MG tablet Take 40mg (1 tablet) in the morning and 20mg (1/2 tablet) in the afternoon    gabapentin (NEURONTIN) 600 MG tablet Take 600 mg by mouth 3 (three) times daily.    isosorbide mononitrate (IMDUR) 60 MG 24 hr tablet Take 1 tablet (60 mg total) by mouth once daily.    LORazepam (ATIVAN) 0.5 MG tablet Take 1 tablet (0.5 mg total) by mouth every morning.    losartan (COZAAR) 50 MG Tab Take 1 tablet (50 mg total) by mouth once daily.    metFORMIN (GLUCOPHAGE) 500 MG tablet Take 1 tablet (500 mg total) by mouth 2 (two) times daily.    montelukast (SINGULAIR) 10 mg tablet Take 10 mg by mouth once daily.    mv-mn/folic ac/calcium/vit K1 (WOMEN'S 50 PLUS MULTIVITAMIN ORAL) Take 1 tablet by mouth once daily.    nitroGLYCERIN (NITROSTAT) 0.4 MG SL tablet Place 1 tablet (0.4 mg total) under the tongue every 5 (five) minutes as needed for Chest pain.    nystatin-triamcinolone (MYCOLOG) ointment Apply topically 3 (three) times daily.    oxybutynin (DITROPAN-XL) 5 MG TR24 Take 1 tablet (5 mg total) by mouth every other day.    pantoprazole (PROTONIX) 40 MG tablet Take 1 tablet (40 mg total) by mouth once daily.    phenazopyridine HCl (PYRIDIUM ORAL) Take 1 tablet by mouth 3 (three) times daily as needed.    POLYSORBATE 80/GLYCERIN (REFRESH DRY EYE THERAPY OPHT)  Apply to eye 2 (two) times daily.    potassium gluconate 550 mg (90 mg) Tab Take 180 mg by mouth 2 (two) times a day.    ranolazine (RANEXA) 1,000 mg Tb12 Take 1 tablet (1,000 mg total) by mouth 2 (two) times daily.    SYMBICORT 160-4.5 mcg/actuation HFAA Inhale 1 puff into the lungs 2 (two) times daily.    vitamin D (VITAMIN D3) 1000 units Tab Take 2,000 Units by mouth once daily.    ZINC ORAL Take 1 tablet by mouth once daily.     Family History       Problem Relation (Age of Onset)    Heart attack Mother    Heart failure Brother    Stroke Father          Tobacco Use    Smoking status: Never    Smokeless tobacco: Never   Substance and Sexual Activity    Alcohol use: No    Drug use: No    Sexual activity: Not Currently     Review of Systems   Constitutional:  Positive for fatigue. Negative for chills and fever.   HENT:  Negative for sore throat and trouble swallowing.    Eyes:  Negative for photophobia and visual disturbance.   Respiratory:  Positive for cough, shortness of breath and wheezing.    Cardiovascular:  Positive for leg swelling. Negative for chest pain and palpitations.   Gastrointestinal:  Negative for abdominal distention, abdominal pain, diarrhea, nausea and vomiting.   Genitourinary:  Negative for dysuria and hematuria.   Musculoskeletal:  Negative for neck pain and neck stiffness.   Skin:  Negative for rash and wound.   Neurological:  Positive for weakness. Negative for seizures, syncope, light-headedness, numbness and headaches.   Psychiatric/Behavioral:  Negative for confusion and decreased concentration.      Objective:     Vital Signs (Most Recent):  Temp: 98.2 °F (36.8 °C) (02/09/24 2212)  Pulse: 69 (02/09/24 2248)  Resp: (!) 29 (02/09/24 2248)  BP: (!) 168/74 (02/09/24 2248)  SpO2: 97 % (02/09/24 2248) Vital Signs (24h Range):  Temp:  [97.6 °F (36.4 °C)-98.2 °F (36.8 °C)] 98.2 °F (36.8 °C)  Pulse:  [68-71] 69  Resp:  [15-29] 29  SpO2:  [90 %-100 %] 97 %  BP: (126-199)/() 168/74      Weight: 102.5 kg (226 lb)  Body mass index is 38.79 kg/m².     Physical Exam  Constitutional:       General: She is not in acute distress.     Appearance: She is obese. She is not toxic-appearing or diaphoretic.   HENT:      Head: Normocephalic and atraumatic.      Nose: Nose normal.   Eyes:      General: No scleral icterus.     Extraocular Movements: Extraocular movements intact.      Pupils: Pupils are equal, round, and reactive to light.   Cardiovascular:      Rate and Rhythm: Normal rate. Rhythm irregular.   Pulmonary:      Effort: Pulmonary effort is normal. No respiratory distress.      Breath sounds: Wheezing and rales present.   Abdominal:      General: Abdomen is flat. There is no distension.      Palpations: Abdomen is soft.      Tenderness: There is no abdominal tenderness. There is no guarding.   Musculoskeletal:         General: Normal range of motion.      Cervical back: Normal range of motion and neck supple. No rigidity.      Right lower leg: Edema present.      Left lower leg: Edema present.   Skin:     General: Skin is warm and dry.      Coloration: Skin is not jaundiced.   Neurological:      General: No focal deficit present.      Mental Status: She is alert and oriented to person, place, and time.      Cranial Nerves: No cranial nerve deficit.   Psychiatric:         Mood and Affect: Mood normal.         Behavior: Behavior normal.              CRANIAL NERVES     CN III, IV, VI   Pupils are equal, round, and reactive to light.       Significant Labs: All pertinent labs within the past 24 hours have been reviewed.  CBC:   Recent Labs   Lab 02/09/24  1708   WBC 7.02   HGB 12.8   HCT 40.3        CMP:   Recent Labs   Lab 02/09/24  1708      K 5.0   CL 98   CO2 29   GLU 89   BUN 24*   CREATININE 0.8   CALCIUM 9.2   PROT 7.4   ALBUMIN 3.7   BILITOT 0.3   ALKPHOS 44*   AST 31   ALT 14   ANIONGAP 10     Cardiac Markers:   Recent Labs   Lab 02/09/24  1708   *       Significant  Imaging: I have reviewed all pertinent imaging results/findings within the past 24 hours.

## 2024-02-10 NOTE — SUBJECTIVE & OBJECTIVE
Interval History: NAEON, diuresing well, f/u with echo     Review of Systems   Constitutional:  Positive for fatigue. Negative for appetite change.   Respiratory:  Positive for shortness of breath. Negative for cough.    Cardiovascular:  Positive for leg swelling. Negative for chest pain.   Gastrointestinal:  Negative for abdominal distention, abdominal pain, constipation and diarrhea.   Genitourinary:  Negative for difficulty urinating and dysuria.   Neurological:  Negative for dizziness and headaches.     Objective:     Vital Signs (Most Recent):  Temp: 97.4 °F (36.3 °C) (02/10/24 1133)  Pulse: 69 (02/10/24 1133)  Resp: 18 (02/10/24 1133)  BP: 137/65 (02/10/24 1133)  SpO2: 100 % (02/10/24 1133) Vital Signs (24h Range):  Temp:  [97.4 °F (36.3 °C)-98.2 °F (36.8 °C)] 97.4 °F (36.3 °C)  Pulse:  [68-71] 69  Resp:  [15-29] 18  SpO2:  [90 %-100 %] 100 %  BP: (117-199)/() 137/65     Weight: 102.5 kg (226 lb)  Body mass index is 38.79 kg/m².  No intake or output data in the 24 hours ending 02/10/24 1149      Physical Exam  Constitutional:       Appearance: Normal appearance.   HENT:      Head: Normocephalic and atraumatic.      Mouth/Throat:      Mouth: Mucous membranes are moist.   Cardiovascular:      Rate and Rhythm: Normal rate and regular rhythm.      Pulses: Normal pulses.      Heart sounds: Normal heart sounds.   Pulmonary:      Effort: Pulmonary effort is normal.      Breath sounds: Rales present.   Abdominal:      General: Abdomen is flat. Bowel sounds are normal. There is no distension.      Palpations: Abdomen is soft.      Tenderness: There is no abdominal tenderness.   Musculoskeletal:         General: Normal range of motion.      Cervical back: Normal range of motion and neck supple.      Right lower leg: Edema present.      Left lower leg: Edema present.   Skin:     General: Skin is warm and dry.   Neurological:      General: No focal deficit present.      Mental Status: She is alert and oriented to  person, place, and time.   Psychiatric:         Mood and Affect: Mood normal.             Significant Labs: All pertinent labs within the past 24 hours have been reviewed.    Significant Imaging: I have reviewed all pertinent imaging results/findings within the past 24 hours.

## 2024-02-10 NOTE — PLAN OF CARE
Patient had a Echo and US of BLE. No acute events noted.  at bedside.  Blood glucose monitored. No concerns voiced at this time .      Problem: Adult Inpatient Plan of Care  Goal: Plan of Care Review  Outcome: Ongoing, Progressing  Goal: Patient-Specific Goal (Individualized)  Outcome: Ongoing, Progressing  Goal: Absence of Hospital-Acquired Illness or Injury  Outcome: Ongoing, Progressing  Goal: Optimal Comfort and Wellbeing  Outcome: Ongoing, Progressing  Goal: Readiness for Transition of Care  Outcome: Ongoing, Progressing     Problem: Diabetes Comorbidity  Goal: Blood Glucose Level Within Targeted Range  Outcome: Ongoing, Progressing     Problem: Oral Intake Inadequate (Acute Kidney Injury/Impairment)  Goal: Optimal Nutrition Intake  Outcome: Ongoing, Progressing     Problem: Renal Function Impairment (Acute Kidney Injury/Impairment)  Goal: Effective Renal Function  Outcome: Ongoing, Progressing     Problem: Skin Injury Risk Increased  Goal: Skin Health and Integrity  Outcome: Ongoing, Progressing

## 2024-02-10 NOTE — CONSULTS
Food & Nutrition  Education    Diet Education: Low sodium, Fluid restriction  Time Spent: 4 minutes   Learners: Pt       Nutrition Education provided with handouts:   Handouts discussed the importance of a low sodium diet. Reviewed high sodium foods that should be avoided.Fluid intake and conversions discussed. Foods considered fluids were reviewed and encouraged to monitor. General healthy diet encouraged. All questions/concerns were addressed.    All questions and concerns answered. Dietitian's contact information provided.     Follow-Up: Yes     Please Re-consult as needed        Thanks!

## 2024-02-11 LAB
ANION GAP SERPL CALC-SCNC: 7 MMOL/L (ref 8–16)
BUN SERPL-MCNC: 18 MG/DL (ref 8–23)
CALCIUM SERPL-MCNC: 9 MG/DL (ref 8.7–10.5)
CHLORIDE SERPL-SCNC: 98 MMOL/L (ref 95–110)
CO2 SERPL-SCNC: 33 MMOL/L (ref 23–29)
CREAT SERPL-MCNC: 0.8 MG/DL (ref 0.5–1.4)
EST. GFR  (NO RACE VARIABLE): >60 ML/MIN/1.73 M^2
GLUCOSE SERPL-MCNC: 115 MG/DL (ref 70–110)
POCT GLUCOSE: 103 MG/DL (ref 70–110)
POCT GLUCOSE: 122 MG/DL (ref 70–110)
POCT GLUCOSE: 126 MG/DL (ref 70–110)
POCT GLUCOSE: 165 MG/DL (ref 70–110)
POTASSIUM SERPL-SCNC: 3.6 MMOL/L (ref 3.5–5.1)
SODIUM SERPL-SCNC: 138 MMOL/L (ref 136–145)

## 2024-02-11 PROCEDURE — 99900035 HC TECH TIME PER 15 MIN (STAT)

## 2024-02-11 PROCEDURE — 25000003 PHARM REV CODE 250: Performed by: FAMILY MEDICINE

## 2024-02-11 PROCEDURE — 25000242 PHARM REV CODE 250 ALT 637 W/ HCPCS: Performed by: FAMILY MEDICINE

## 2024-02-11 PROCEDURE — 36415 COLL VENOUS BLD VENIPUNCTURE: CPT | Performed by: FAMILY MEDICINE

## 2024-02-11 PROCEDURE — 63600175 PHARM REV CODE 636 W HCPCS: Performed by: FAMILY MEDICINE

## 2024-02-11 PROCEDURE — 25000003 PHARM REV CODE 250: Performed by: HOSPITALIST

## 2024-02-11 PROCEDURE — 27000221 HC OXYGEN, UP TO 24 HOURS

## 2024-02-11 PROCEDURE — 80048 BASIC METABOLIC PNL TOTAL CA: CPT | Performed by: FAMILY MEDICINE

## 2024-02-11 PROCEDURE — 20600001 HC STEP DOWN PRIVATE ROOM

## 2024-02-11 PROCEDURE — 94640 AIRWAY INHALATION TREATMENT: CPT

## 2024-02-11 PROCEDURE — 99233 SBSQ HOSP IP/OBS HIGH 50: CPT | Mod: ,,, | Performed by: INTERNAL MEDICINE

## 2024-02-11 PROCEDURE — 94761 N-INVAS EAR/PLS OXIMETRY MLT: CPT

## 2024-02-11 RX ORDER — SENNOSIDES 8.6 MG/1
8.6 TABLET ORAL NIGHTLY
Status: DISCONTINUED | OUTPATIENT
Start: 2024-02-11 | End: 2024-02-16 | Stop reason: HOSPADM

## 2024-02-11 RX ORDER — POLYETHYLENE GLYCOL 3350 17 G/17G
17 POWDER, FOR SOLUTION ORAL 2 TIMES DAILY
Status: DISCONTINUED | OUTPATIENT
Start: 2024-02-11 | End: 2024-02-16 | Stop reason: HOSPADM

## 2024-02-11 RX ADMIN — RANOLAZINE 1000 MG: 1000 TABLET, FILM COATED, EXTENDED RELEASE ORAL at 09:02

## 2024-02-11 RX ADMIN — MONTELUKAST 10 MG: 10 TABLET, FILM COATED ORAL at 09:02

## 2024-02-11 RX ADMIN — PANTOPRAZOLE SODIUM 40 MG: 40 TABLET, DELAYED RELEASE ORAL at 09:02

## 2024-02-11 RX ADMIN — GABAPENTIN 600 MG: 300 CAPSULE ORAL at 09:02

## 2024-02-11 RX ADMIN — APIXABAN 5 MG: 5 TABLET, FILM COATED ORAL at 08:02

## 2024-02-11 RX ADMIN — APIXABAN 5 MG: 5 TABLET, FILM COATED ORAL at 09:02

## 2024-02-11 RX ADMIN — POLYETHYLENE GLYCOL 3350 17 G: 17 POWDER, FOR SOLUTION ORAL at 11:02

## 2024-02-11 RX ADMIN — ATORVASTATIN CALCIUM 10 MG: 10 TABLET, FILM COATED ORAL at 09:02

## 2024-02-11 RX ADMIN — CARVEDILOL 12.5 MG: 12.5 TABLET, FILM COATED ORAL at 08:02

## 2024-02-11 RX ADMIN — FUROSEMIDE 80 MG: 10 INJECTION, SOLUTION INTRAVENOUS at 09:02

## 2024-02-11 RX ADMIN — GABAPENTIN 600 MG: 300 CAPSULE ORAL at 08:02

## 2024-02-11 RX ADMIN — FLUTICASONE FUROATE AND VILANTEROL TRIFENATATE 1 PUFF: 100; 25 POWDER RESPIRATORY (INHALATION) at 09:02

## 2024-02-11 RX ADMIN — LOSARTAN POTASSIUM 50 MG: 50 TABLET, FILM COATED ORAL at 09:02

## 2024-02-11 RX ADMIN — ACETAMINOPHEN 1000 MG: 500 TABLET ORAL at 05:02

## 2024-02-11 RX ADMIN — ISOSORBIDE MONONITRATE 60 MG: 30 TABLET, EXTENDED RELEASE ORAL at 09:02

## 2024-02-11 RX ADMIN — GABAPENTIN 600 MG: 300 CAPSULE ORAL at 03:02

## 2024-02-11 RX ADMIN — CARVEDILOL 12.5 MG: 12.5 TABLET, FILM COATED ORAL at 09:02

## 2024-02-11 RX ADMIN — SENNOSIDES 8.6 MG: 8.6 TABLET, FILM COATED ORAL at 11:02

## 2024-02-11 RX ADMIN — RANOLAZINE 1000 MG: 1000 TABLET, FILM COATED, EXTENDED RELEASE ORAL at 08:02

## 2024-02-11 RX ADMIN — DULOXETINE HYDROCHLORIDE 20 MG: 20 CAPSULE, DELAYED RELEASE ORAL at 09:02

## 2024-02-11 RX ADMIN — FUROSEMIDE 80 MG: 10 INJECTION, SOLUTION INTRAVENOUS at 08:02

## 2024-02-11 RX ADMIN — CLOPIDOGREL BISULFATE 75 MG: 75 TABLET ORAL at 09:02

## 2024-02-11 RX ADMIN — DILTIAZEM HYDROCHLORIDE 240 MG: 240 CAPSULE, COATED, EXTENDED RELEASE ORAL at 09:02

## 2024-02-11 NOTE — SUBJECTIVE & OBJECTIVE
Interval History: NAEON, diuresing well, UOP 3L. Satts well on 4L NC     Review of Systems   Constitutional:  Positive for fatigue. Negative for appetite change.   Respiratory:  Positive for shortness of breath. Negative for cough.    Cardiovascular:  Positive for leg swelling. Negative for chest pain.   Gastrointestinal:  Negative for abdominal distention, abdominal pain, constipation and diarrhea.   Genitourinary:  Negative for difficulty urinating and dysuria.   Neurological:  Negative for dizziness and headaches.     Objective:     Vital Signs (Most Recent):  Temp: 98.1 °F (36.7 °C) (02/11/24 0852)  Pulse: 70 (02/11/24 0900)  Resp: 18 (02/11/24 0900)  BP: (!) 149/67 (02/11/24 0852)  SpO2: 99 % (02/11/24 0900) Vital Signs (24h Range):  Temp:  [97.4 °F (36.3 °C)-98.1 °F (36.7 °C)] 98.1 °F (36.7 °C)  Pulse:  [68-71] 70  Resp:  [17-18] 18  SpO2:  [99 %-100 %] 99 %  BP: (120-149)/(57-67) 149/67     Weight: 102.5 kg (226 lb)  Body mass index is 38.79 kg/m².    Intake/Output Summary (Last 24 hours) at 2/11/2024 1123  Last data filed at 2/11/2024 0551  Gross per 24 hour   Intake --   Output 2950 ml   Net -2950 ml         Physical Exam  Constitutional:       Appearance: Normal appearance.   HENT:      Head: Normocephalic and atraumatic.      Mouth/Throat:      Mouth: Mucous membranes are moist.   Cardiovascular:      Rate and Rhythm: Normal rate and regular rhythm.      Pulses: Normal pulses.      Heart sounds: Normal heart sounds.   Pulmonary:      Effort: Pulmonary effort is normal.      Breath sounds: Rales present.   Abdominal:      General: Abdomen is flat. Bowel sounds are normal. There is no distension.      Palpations: Abdomen is soft.      Tenderness: There is no abdominal tenderness.   Musculoskeletal:         General: Normal range of motion.      Cervical back: Normal range of motion and neck supple.      Right lower leg: Edema present.      Left lower leg: Edema present.   Skin:     General: Skin is warm and  dry.   Neurological:      General: No focal deficit present.      Mental Status: She is alert and oriented to person, place, and time.   Psychiatric:         Mood and Affect: Mood normal.             Significant Labs: All pertinent labs within the past 24 hours have been reviewed.    Significant Imaging: I have reviewed all pertinent imaging results/findings within the past 24 hours.

## 2024-02-11 NOTE — ASSESSMENT & PLAN NOTE
- current symptoms felt more secondary to CHF over COPD exacerbation. Though potentially progressive chronic COPD changes without acute exacerbation may also be etiology  - previously on home 2L O2 after 6min walk test  - currently saturating well on 4L O2  - xopenex x1 in ED  - Duonebs q4h prn  - continue home LABA-ICS and symbicort

## 2024-02-11 NOTE — PLAN OF CARE
Problem: Adult Inpatient Plan of Care  Goal: Plan of Care Review  Outcome: Ongoing, Progressing  Goal: Patient-Specific Goal (Individualized)  Outcome: Ongoing, Progressing  Goal: Absence of Hospital-Acquired Illness or Injury  Outcome: Ongoing, Progressing  Goal: Optimal Comfort and Wellbeing  Outcome: Ongoing, Progressing  Goal: Readiness for Transition of Care  Outcome: Ongoing, Progressing     Problem: Diabetes Comorbidity  Goal: Blood Glucose Level Within Targeted Range  Outcome: Ongoing, Progressing     Problem: Fluid and Electrolyte Imbalance (Acute Kidney Injury/Impairment)  Goal: Fluid and Electrolyte Balance  Outcome: Ongoing, Progressing     Problem: Oral Intake Inadequate (Acute Kidney Injury/Impairment)  Goal: Optimal Nutrition Intake  Outcome: Ongoing, Progressing     Problem: Renal Function Impairment (Acute Kidney Injury/Impairment)  Goal: Effective Renal Function  Outcome: Ongoing, Progressing     Problem: Skin Injury Risk Increased  Goal: Skin Health and Integrity  Outcome: Ongoing, Progressing   Pt Aox4, no PRNs given. Family present at bedside. Call light within reach, safety measures in place, rounded per facility policy.

## 2024-02-11 NOTE — NURSING
Patient had no acute events during shift.  Blood sugar monitored, bed low, call light within reach of the patient. No concerns voiced at this time

## 2024-02-11 NOTE — ASSESSMENT & PLAN NOTE
- IP CHF Pathway initiated  - continue lasix 80mg iv BID  - strict I/Os  - daily weights  - low sodium fluid restricted diet  - B/L lower extremity US neg for DVT   - ECHO?: Regional wall motion abnormalities present. There is moderately reduced systolic function with a visually estimated ejection fraction of 35 - 40%   - continue home plavix, and statin  - monitor tele  - further management pending clinical course and future study review

## 2024-02-11 NOTE — PHARMACY MED REC
"  Admission Medication History     The home medication history was taken by Becca Tsang.    You may go to "Admission" then "Reconcile Home Medications" tabs to review and/or act upon these items.     The home medication list has been updated by the Pharmacy department.   Please read ALL comments highlighted in yellow.   Please address this information as you see fit.    Feel free to contact us if you have any questions or require assistance.      The medications listed below were removed from the home medication list. Please reorder if appropriate:  Patient reports no longer taking the following medication(s):  NYSTATIN-TRIAMCINOLONE OINTMENT   PHENAZOPYRIDINE HCL   POLYSORBATE 80/GLYCERIN OPHT  POTASSIUM GLUCONATE 550 MG   SYMBICORT 160-4.5 MCG / ACTUATION   VITAMIN D D3 1000 UNITS TABLET   ZINC ORAL     Medications listed below were obtained from: Patient/family and Analytic software- myseek[employer]  PTA Medications   Medication Sig    acetaminophen (TYLENOL) 650 MG TbSR Take 1,300 mg by mouth 2 (two) times daily as needed.      albuterol-ipratropium (DUO-NEB) 2.5 mg-0.5 mg/3 mL nebulizer solution   Take 3 mLs by nebulization every 4 (four) hours as needed for Wheezing or Shortness of Breath.    apixaban (ELIQUIS) 5 mg Tab TAKE 1 TABLET BY MOUTH TWICE A DAY.      ascorbic acid, vitamin C, (VITAMIN C) 500 MG tablet   Take 1,000 mg by mouth once daily.     atorvastatin (LIPITOR) 10 MG tablet   Take 1 tablet (10 mg total) by mouth once daily.    calcium carbonate/vitamin D3 (CALTRATE 600 + D ORAL)   Take 1 tablet by mouth once daily.    carvediloL (COREG) 12.5 MG tablet Take 1 tablet (12.5 mg total) by mouth 2 (two) times daily.      clopidogreL (PLAVIX) 75 mg tablet   TAKE 1 TABLET BY MOUTH EVERY DAY.      diltiaZEM (CARDIZEM CD) 240 MG 24 hr capsule   TAKE 1 CAPSULE BY MOUTH ONCE A DAY    DULoxetine (CYMBALTA) 20 MG capsule   Take 1 capsule (20 mg total) by mouth once daily.    empagliflozin (JARDIANCE) 10 mg tablet   " Take 1 tablet (10 mg total) by mouth once daily.    fexofenadine (ALLEGRA) 60 MG tablet   Take 60 mg by mouth once daily.    fish oil-omega-3 fatty acids 300-1,000 mg capsule   Take 1 g by mouth once daily.     furosemide (LASIX) 40 MG tablet Take 40mg (1 tablet) in the morning and 20mg (1/2 tablet) in the afternoon.      gabapentin (NEURONTIN) 600 MG tablet   Take 600 mg by mouth 3 (three) times daily.    isosorbide mononitrate (IMDUR) 60 MG 24 hr tablet   Take 1 tablet (60 mg total) by mouth once daily.    LORazepam (ATIVAN) 0.5 MG tablet   Take 1 tablet (0.5 mg total) by mouth every morning.    losartan (COZAAR) 50 MG Tab Take 1 tablet (50 mg total) by mouth once daily.      metFORMIN (GLUCOPHAGE) 500 MG tablet   Take 1 tablet (500 mg total) by mouth 2 (two) times daily.    montelukast (SINGULAIR) 10 mg tablet   Take 10 mg by mouth once daily.    mv-mn/folic ac/calcium/vit K1 (WOMEN'S 50 PLUS MULTIVITAMIN ORAL) Take 1 tablet by mouth once daily.    nitroGLYCERIN (NITROSTAT) 0.4 MG SL tablet Place 1 tablet (0.4 mg total) under the tongue every 5 (five) minutes as needed for Chest pain.      pantoprazole (PROTONIX) 40 MG tablet   Take 1 tablet (40 mg total) by mouth once daily.    potassium gluconate 550 mg (90 mg) Tab   Take 180 mg by mouth 2 (two) times a day.    ranolazine (RANEXA) 1,000 mg Tb12 Take 1 tablet (1,000 mg total) by mouth 2 (two) times daily.      SYMBICORT 160-4.5 mcg/actuation HFAA Inhale 1 puff into the lungs 2 (two) times daily.   Patient reports not taking medication.        Becca Burfect  EXT [id number]               .

## 2024-02-11 NOTE — PROGRESS NOTES
[facility] ([place] [address])  [facility] Medicine  Progress Note    Patient Name: Adriana Strong  MRN: [id number]  Patient Class: OP- Observation   Admission Date: 2/9/2024  Length of Stay: 0 days  Attending Physician: Roberta Garcia MD  Primary Care Provider: Krzysztof Mcgraw MD        Subjective:     Principal Problem:Acute on chronic heart failure with preserved ejection fraction        HPI:  79F pmhx HLD, HTN, DM2, s/p TAVR (2019), CAD (pre TAVR 70% iLAD lesion), HFpEF, atrial fibrillation, PPM, COPD previously on home 2L home O2 but seems to not recently been using who presents to the ED from Cardiology clinic for concern of CHF exacerbation and worsening WHITLEY in setting of COPD. Patient reports she cannot walk across the room without getting SOB. Reports increasing bilateral leg swelling. Needs several pillows and 45 degree incline to sleep. Wheezing and using duonebs prn a couple times a week. Also reports chest tightness that occurs 1-5 minutes at a time, at random, unassociated with exercise nor eating, denies heartburn. Denies palpitations, lightheadedness. Patient reports compliance with diuretics and cardiac meds (lasix 40mg am, 20mg pm). Does not add salt to diet nor eats fried foods.    In the ED patient afebrile and hemodynamically stable saturating well on 2L NC.  and significant bilateral pitting edema. CXR without overt acute process. Patient with bilateral rales and slight wheezing. Started on lasix iv and given xopenex x1. Admitted to the care of medicine for further evaluation and management.     Overview/Hospital Course:  Pt sent to ED from cardio to be admitted for acute on chronic HF and exercise intolerance , started on iv lasix and O2 per protocol. Echo 2/10: Regional wall motion abnormalities present. There is moderately reduced systolic function with a visually estimated ejection fraction of 35 - 40% . US is neg for SVT     Interval History: NAEON, diuresing well, UOP  3L. Satts well on 4L NC     Review of Systems   Constitutional:  Positive for fatigue. Negative for appetite change.   Respiratory:  Positive for shortness of breath. Negative for cough.    Cardiovascular:  Positive for leg swelling. Negative for chest pain.   Gastrointestinal:  Negative for abdominal distention, abdominal pain, constipation and diarrhea.   Genitourinary:  Negative for difficulty urinating and dysuria.   Neurological:  Negative for dizziness and headaches.     Objective:     Vital Signs (Most Recent):  Temp: 98.1 °F (36.7 °C) (02/11/24 0852)  Pulse: 70 (02/11/24 0900)  Resp: 18 (02/11/24 0900)  BP: (!) 149/67 (02/11/24 0852)  SpO2: 99 % (02/11/24 0900) Vital Signs (24h Range):  Temp:  [97.4 °F (36.3 °C)-98.1 °F (36.7 °C)] 98.1 °F (36.7 °C)  Pulse:  [68-71] 70  Resp:  [17-18] 18  SpO2:  [99 %-100 %] 99 %  BP: (120-149)/(57-67) 149/67     Weight: 102.5 kg (226 lb)  Body mass index is 38.79 kg/m².    Intake/Output Summary (Last 24 hours) at 2/11/2024 1123  Last data filed at 2/11/2024 0551  Gross per 24 hour   Intake --   Output 2950 ml   Net -2950 ml         Physical Exam  Constitutional:       Appearance: Normal appearance.   HENT:      Head: Normocephalic and atraumatic.      Mouth/Throat:      Mouth: Mucous membranes are moist.   Cardiovascular:      Rate and Rhythm: Normal rate and regular rhythm.      Pulses: Normal pulses.      Heart sounds: Normal heart sounds.   Pulmonary:      Effort: Pulmonary effort is normal.      Breath sounds: Rales present.   Abdominal:      General: Abdomen is flat. Bowel sounds are normal. There is no distension.      Palpations: Abdomen is soft.      Tenderness: There is no abdominal tenderness.   Musculoskeletal:         General: Normal range of motion.      Cervical back: Normal range of motion and neck supple.      Right lower leg: Edema present.      Left lower leg: Edema present.   Skin:     General: Skin is warm and dry.   Neurological:      General: No focal  deficit present.      Mental Status: She is alert and oriented to person, place, and time.   Psychiatric:         Mood and Affect: Mood normal.             Significant Labs: All pertinent labs within the past 24 hours have been reviewed.    Significant Imaging: I have reviewed all pertinent imaging results/findings within the past 24 hours.    Assessment/Plan:      * Acute on chronic heart failure with preserved ejection fraction  - IP CHF Pathway initiated  - continue lasix 80mg iv BID  - strict I/Os  - daily weights  - low sodium fluid restricted diet  - B/L lower extremity US neg for DVT   - ECHO?: Regional wall motion abnormalities present. There is moderately reduced systolic function with a visually estimated ejection fraction of 35 - 40%   - continue home plavix, and statin  - monitor tele  - further management pending clinical course and future study review        S/P TAVR (transcatheter aortic valve replacement)  - continue home meds      COPD (chronic obstructive pulmonary disease)  - current symptoms felt more secondary to CHF over COPD exacerbation. Though potentially progressive chronic COPD changes without acute exacerbation may also be etiology  - previously on home 2L O2 after 6min walk test  - currently saturating well on 4L O2  - xopenex x1 in ED  - Duonebs q4h prn  - continue home LABA-ICS and symbicort    Persistent atrial fibrillation  - continue home Eliquis  - continue home coreg and diltiazem    Essential hypertension  - continue home meds coreg, losartan, diltiazem    Type 2 diabetes mellitus  - SSI  - hypoglycemic protocol      VTE Risk Mitigation (From admission, onward)           Ordered     apixaban tablet 5 mg  2 times daily         02/09/24 2110                    Discharge Planning   MIMI:      Code Status: Full Code   Is the patient medically ready for discharge?:     Reason for patient still in hospital (select all that apply): Treatment                     Roberta Garcia,  MD  Department of Hospital Medicine   [facility] Flex ([place])

## 2024-02-12 PROBLEM — I50.33 ACUTE ON CHRONIC HEART FAILURE WITH PRESERVED EJECTION FRACTION: Status: RESOLVED | Noted: 2022-09-30 | Resolved: 2024-02-12

## 2024-02-12 PROBLEM — I50.23 ACUTE ON CHRONIC SYSTOLIC HEART FAILURE: Status: ACTIVE | Noted: 2024-02-12

## 2024-02-12 LAB
ANION GAP SERPL CALC-SCNC: 10 MMOL/L (ref 8–16)
BUN SERPL-MCNC: 17 MG/DL (ref 8–23)
CALCIUM SERPL-MCNC: 9 MG/DL (ref 8.7–10.5)
CHLORIDE SERPL-SCNC: 94 MMOL/L (ref 95–110)
CO2 SERPL-SCNC: 30 MMOL/L (ref 23–29)
CREAT SERPL-MCNC: 0.7 MG/DL (ref 0.5–1.4)
EST. GFR  (NO RACE VARIABLE): >60 ML/MIN/1.73 M^2
GLUCOSE SERPL-MCNC: 106 MG/DL (ref 70–110)
POCT GLUCOSE: 129 MG/DL (ref 70–110)
POCT GLUCOSE: 134 MG/DL (ref 70–110)
POCT GLUCOSE: 147 MG/DL (ref 70–110)
POCT GLUCOSE: 148 MG/DL (ref 70–110)
POTASSIUM SERPL-SCNC: 3.5 MMOL/L (ref 3.5–5.1)
SODIUM SERPL-SCNC: 134 MMOL/L (ref 136–145)

## 2024-02-12 PROCEDURE — 99233 SBSQ HOSP IP/OBS HIGH 50: CPT | Mod: GC,,, | Performed by: INTERNAL MEDICINE

## 2024-02-12 PROCEDURE — 99900035 HC TECH TIME PER 15 MIN (STAT)

## 2024-02-12 PROCEDURE — 20600001 HC STEP DOWN PRIVATE ROOM

## 2024-02-12 PROCEDURE — 36415 COLL VENOUS BLD VENIPUNCTURE: CPT | Performed by: FAMILY MEDICINE

## 2024-02-12 PROCEDURE — 25000003 PHARM REV CODE 250: Performed by: FAMILY MEDICINE

## 2024-02-12 PROCEDURE — 94640 AIRWAY INHALATION TREATMENT: CPT

## 2024-02-12 PROCEDURE — 94761 N-INVAS EAR/PLS OXIMETRY MLT: CPT

## 2024-02-12 PROCEDURE — 80048 BASIC METABOLIC PNL TOTAL CA: CPT | Performed by: FAMILY MEDICINE

## 2024-02-12 PROCEDURE — 27000221 HC OXYGEN, UP TO 24 HOURS

## 2024-02-12 PROCEDURE — 63600175 PHARM REV CODE 636 W HCPCS: Performed by: FAMILY MEDICINE

## 2024-02-12 PROCEDURE — 25000003 PHARM REV CODE 250: Performed by: HOSPITALIST

## 2024-02-12 RX ADMIN — DULOXETINE HYDROCHLORIDE 20 MG: 20 CAPSULE, DELAYED RELEASE ORAL at 08:02

## 2024-02-12 RX ADMIN — RANOLAZINE 1000 MG: 1000 TABLET, FILM COATED, EXTENDED RELEASE ORAL at 08:02

## 2024-02-12 RX ADMIN — FUROSEMIDE 80 MG: 10 INJECTION, SOLUTION INTRAVENOUS at 09:02

## 2024-02-12 RX ADMIN — CARVEDILOL 12.5 MG: 12.5 TABLET, FILM COATED ORAL at 09:02

## 2024-02-12 RX ADMIN — GABAPENTIN 600 MG: 300 CAPSULE ORAL at 03:02

## 2024-02-12 RX ADMIN — CARVEDILOL 12.5 MG: 12.5 TABLET, FILM COATED ORAL at 08:02

## 2024-02-12 RX ADMIN — APIXABAN 5 MG: 5 TABLET, FILM COATED ORAL at 09:02

## 2024-02-12 RX ADMIN — LORAZEPAM 0.5 MG: 0.5 TABLET ORAL at 08:02

## 2024-02-12 RX ADMIN — RANOLAZINE 1000 MG: 1000 TABLET, FILM COATED, EXTENDED RELEASE ORAL at 09:02

## 2024-02-12 RX ADMIN — APIXABAN 5 MG: 5 TABLET, FILM COATED ORAL at 08:02

## 2024-02-12 RX ADMIN — CLOPIDOGREL BISULFATE 75 MG: 75 TABLET ORAL at 08:02

## 2024-02-12 RX ADMIN — FUROSEMIDE 80 MG: 10 INJECTION, SOLUTION INTRAVENOUS at 10:02

## 2024-02-12 RX ADMIN — POLYETHYLENE GLYCOL 3350 17 G: 17 POWDER, FOR SOLUTION ORAL at 09:02

## 2024-02-12 RX ADMIN — FLUTICASONE FUROATE AND VILANTEROL TRIFENATATE 1 PUFF: 100; 25 POWDER RESPIRATORY (INHALATION) at 09:02

## 2024-02-12 RX ADMIN — POLYETHYLENE GLYCOL 3350 17 G: 17 POWDER, FOR SOLUTION ORAL at 08:02

## 2024-02-12 RX ADMIN — ATORVASTATIN CALCIUM 10 MG: 10 TABLET, FILM COATED ORAL at 08:02

## 2024-02-12 RX ADMIN — GABAPENTIN 600 MG: 300 CAPSULE ORAL at 08:02

## 2024-02-12 RX ADMIN — LOSARTAN POTASSIUM 50 MG: 50 TABLET, FILM COATED ORAL at 08:02

## 2024-02-12 RX ADMIN — ISOSORBIDE MONONITRATE 60 MG: 30 TABLET, EXTENDED RELEASE ORAL at 08:02

## 2024-02-12 RX ADMIN — MONTELUKAST 10 MG: 10 TABLET, FILM COATED ORAL at 08:02

## 2024-02-12 RX ADMIN — DILTIAZEM HYDROCHLORIDE 240 MG: 240 CAPSULE, COATED, EXTENDED RELEASE ORAL at 08:02

## 2024-02-12 RX ADMIN — PANTOPRAZOLE SODIUM 40 MG: 40 TABLET, DELAYED RELEASE ORAL at 08:02

## 2024-02-12 RX ADMIN — GABAPENTIN 600 MG: 300 CAPSULE ORAL at 09:02

## 2024-02-12 RX ADMIN — SENNOSIDES 8.6 MG: 8.6 TABLET, FILM COATED ORAL at 09:02

## 2024-02-12 NOTE — SUBJECTIVE & OBJECTIVE
Interval History: NAEON, diuresing well, UOP 5.6L. Weaned down to 3L supplemental oxygen via NC. Home oxygen 2L. TTE with EF reduced to 35%-40% compared to 60% in 9/23. Cardiology consulted.       Review of Systems   Constitutional:  Positive for fatigue. Negative for appetite change.   Respiratory:  Positive for shortness of breath. Negative for cough.    Cardiovascular:  Positive for leg swelling. Negative for chest pain.   Gastrointestinal:  Negative for abdominal distention, abdominal pain, constipation and diarrhea.   Genitourinary:  Negative for difficulty urinating and dysuria.   Neurological:  Negative for dizziness and headaches.     Objective:     Vital Signs (Most Recent):  Temp: 97.9 °F (36.6 °C) (02/12/24 1048)  Pulse: 69 (02/12/24 1111)  Resp: 18 (02/12/24 1048)  BP: 132/62 (02/12/24 1048)  SpO2: 98 % (02/12/24 1048) Vital Signs (24h Range):  Temp:  [97.4 °F (36.3 °C)-98.4 °F (36.9 °C)] 97.9 °F (36.6 °C)  Pulse:  [66-71] 69  Resp:  [16-18] 18  SpO2:  [96 %-100 %] 98 %  BP: (122-165)/(58-72) 132/62     Weight: 102.5 kg (226 lb)  Body mass index is 38.79 kg/m².    Intake/Output Summary (Last 24 hours) at 2/12/2024 1248  Last data filed at 2/11/2024 2140  Gross per 24 hour   Intake --   Output 1900 ml   Net -1900 ml           Physical Exam  Constitutional:       Appearance: Normal appearance.   HENT:      Head: Normocephalic and atraumatic.      Mouth/Throat:      Mouth: Mucous membranes are moist.   Cardiovascular:      Rate and Rhythm: Normal rate and regular rhythm.      Pulses: Normal pulses.      Heart sounds: Normal heart sounds.   Pulmonary:      Effort: Pulmonary effort is normal.      Breath sounds: Rales present.   Abdominal:      General: Abdomen is flat. Bowel sounds are normal. There is no distension.      Palpations: Abdomen is soft.      Tenderness: There is no abdominal tenderness.   Musculoskeletal:         General: Normal range of motion.      Cervical back: Normal range of motion and  neck supple.      Right lower leg: Edema present.      Left lower leg: Edema present.   Skin:     General: Skin is warm and dry.   Neurological:      General: No focal deficit present.      Mental Status: She is alert and oriented to person, place, and time.   Psychiatric:         Mood and Affect: Mood normal.             Significant Labs: All pertinent labs within the past 24 hours have been reviewed.    Significant Imaging: I have reviewed all pertinent imaging results/findings within the past 24 hours.

## 2024-02-12 NOTE — SUBJECTIVE & OBJECTIVE
Review of Systems   Constitutional: Negative for chills and fever.   HENT:  Negative for sore throat.    Eyes:  Negative for visual disturbance.   Cardiovascular:  Positive for chest pain, leg swelling, orthopnea and palpitations. Negative for syncope.   Respiratory:  Positive for cough, shortness of breath and wheezing.    Gastrointestinal:  Negative for abdominal pain, constipation, diarrhea, nausea and vomiting.   Genitourinary:  Negative for dysuria and hematuria.   Psychiatric/Behavioral:  Negative for altered mental status and depression.      Objective:     Vital Signs (Most Recent):  Temp: 98.1 °F (36.7 °C) (02/12/24 1605)  Pulse: 69 (02/12/24 1605)  Resp: 16 (02/12/24 1605)  BP: 126/60 (02/12/24 1605)  SpO2: 99 % (02/12/24 1605) Vital Signs (24h Range):  Temp:  [97.4 °F (36.3 °C)-98.4 °F (36.9 °C)] 98.1 °F (36.7 °C)  Pulse:  [66-70] 69  Resp:  [16-18] 16  SpO2:  [96 %-100 %] 99 %  BP: (126-165)/(58-72) 126/60     Weight: 102.5 kg (226 lb)  Body mass index is 38.79 kg/m².     SpO2: 99 %         Intake/Output Summary (Last 24 hours) at 2/12/2024 1721  Last data filed at 2/12/2024 1529  Gross per 24 hour   Intake --   Output 1600 ml   Net -1600 ml       Lines/Drains/Airways       Drain  Duration             Female External Urinary Catheter w/ Suction 02/09/24 2024 2 days              Peripheral Intravenous Line  Duration                  Peripheral IV - Single Lumen 02/09/24 1706 22 G Posterior;Right Hand 3 days                       Physical Exam  Constitutional:       Appearance: Normal appearance. She is obese.   HENT:      Head: Normocephalic and atraumatic.   Eyes:      Extraocular Movements: Extraocular movements intact.   Cardiovascular:      Rate and Rhythm: Normal rate and regular rhythm.      Pulses: Normal pulses.      Heart sounds: Normal heart sounds.   Pulmonary:      Breath sounds: Wheezing present.      Comments: Crackles auscultated b/t  Abdominal:      General: There is no distension.       Palpations: Abdomen is soft.      Tenderness: There is no abdominal tenderness.   Musculoskeletal:         General: Swelling present.   Skin:     General: Skin is warm.      Capillary Refill: Capillary refill takes less than 2 seconds.   Neurological:      General: No focal deficit present.      Mental Status: She is alert and oriented to person, place, and time.            Significant Labs: All pertinent lab results from the last 24 hours have been reviewed.

## 2024-02-12 NOTE — PLAN OF CARE
[facility] ([place])  Initial Discharge Assessment       Primary Care Provider: Krzysztof Mcgraw MD    Admission Diagnosis: Shortness of breath [R06.02]  CHF (congestive heart failure) [I50.9]  SOB (shortness of breath) [R06.02]    Admission Date: 2/9/2024  Expected Discharge Date: 2/14/2024    Transition of Care Barriers: None    Payor: [employer] / Plan: [employer] CHOICES / Product Type: Medicare Advantage /     Extended Emergency Contact Information  Primary Emergency Contact: Anthony Strong  [address] [place] [address] [facility]  Home Phone: [phone]  Mobile Phone: [phone]  Relation: Spouse  Secondary Emergency Contact: Tammy Kemp  Address: [facility] [address]  Mobile Phone: [phone]  Relation: Daughter    Discharge Plan A: Home with family  Discharge Plan B: Home Health      [id number] - [address] AT CORNER OF [address] [address]  Phone: [phone] Fax: [phone]      Initial Assessment (most recent)       Adult Discharge Assessment - 02/12/24 1200          Discharge Assessment    Assessment Type Discharge Planning Assessment     Confirmed/corrected address, phone number and insurance Yes     Confirmed Demographics Correct on Facesheet     Source of Information family     If unable to respond/provide information was family/caregiver contacted? Yes     Contact Name/Number TaeAnthony (Spouse) [phone] (Mobile)     When was your last doctors appointment? 12/12/23     Communicated MIMI with patient/caregiver Yes     Reason For Admission CHF exacerbation     People in Home spouse     Facility Arrived From: Cardiac Clinic     Do you expect to return to your current living situation? Yes     Do you have help at home or someone to help you manage your care at home? Yes     Who are your  caregiver(s) and their phone number(s)? TaeAnthony (Spouse) [phone] (Mobile)     Prior to hospitilization cognitive status: Alert/Oriented     Current cognitive status: Not Oriented to Place;Not Oriented to Time     Home Accessibility wheelchair accessible     Home Layout Able to live on 1st floor     Equipment Currently Used at Home oxygen;glucometer   Oxygen is supplied through [employer] [phone]    Readmission within 30 days? No     Patient currently being followed by outpatient case management? No     Do you currently have service(s) that help you manage your care at home? No     Do you take prescription medications? Yes     Do you have prescription coverage? Yes     Coverage [employer] [facility] CHOICES -     Do you have any problems affording any of your prescribed medications? No     Is the patient taking medications as prescribed? yes     Who is going to help you get home at discharge? LarimerAnthony (Spouse) [phone] (Mobile)     How do you get to doctors appointments? family or friend will provide     Do you take coumadin? No   Eliquis BID with PPM    Discharge Plan A Home with family     Discharge Plan B Home Health     DME Needed Upon Discharge  other (see comments)   TBD    Discharge Plan discussed with: Spouse/sig other     Name(s) and Number(s) Anthony Strong (Spouse) [phone] (Mobile)     Transition of Care Barriers None        Physical Activity    On average, how many days per week do you engage in moderate to strenuous exercise (like a brisk walk)? 4 days     On average, how many minutes do you engage in exercise at this level? 20 min        Financial Resource Strain    How hard is it for you to pay for the very basics like food, housing, medical care, and heating? Not hard at all        Housing Stability    In the last 12 months, was there a time when you were not able to pay the mortgage or rent on time? No     In the last 12 months, was there a time when you  did not have a steady place to sleep or slept in a shelter (including now)? No        Transportation Needs    In the past 12 months, has lack of transportation kept you from medical appointments or from getting medications? No     In the past 12 months, has lack of transportation kept you from meetings, work, or from getting things needed for daily living? No        Food Insecurity    Within the past 12 months, you worried that your food would run out before you got the money to buy more. Never true     Within the past 12 months, the food you bought just didn't last and you didn't have money to get more. Never true        Stress    Do you feel stress - tense, restless, nervous, or anxious, or unable to sleep at night because your mind is troubled all the time - these days? To some extent        Social Connections    In a typical week, how many times do you talk on the phone with family, friends, or neighbors? Twice a week     How often do you get together with friends or relatives? Once a week     How often do you attend Quaker or Orthodoxy services? 1 to 4 times per year     Do you belong to any clubs or organizations such as Quaker groups, unions, fraternal or athletic groups, or school groups? No     How often do you attend meetings of the clubs or organizations you belong to? Never     Are you , , , , never , or living with a partner?    Anthony Strong (Spouse) [phone] (Mobile)       Alcohol Use    Q1: How often do you have a drink containing alcohol? Never     Q3: How often do you have six or more drinks on one occasion? Never        OTHER    Name(s) of People in Home Anthony Strong (Spouse) [phone] (Mobile)                          CM met with patients spouse  at bedside to complete discharge planning assessment.  Patient alert and oriented xs 4.  Patients spouse verified all demographic information on facesheet is correct.  Patient verified PCP is Dr Blankenship  [place].  Patient verified primary health insurance is [employer] MGD [facility] CHOICES -  . Patients family is agreeable with bedside medication delivery.   Patient is not active with  home health and has listed DME.  Patient with NO POA or Living Will.  Patient not on dialysis or medication coumadin.  Patient with no 30 day admission.  Patient with no financial issues at this time.  Patient family  will provide transportation upon discharge from facility.  Patient will have assistance from her spouse upon discharge Patient semi  independent with ADLs, lives  with spouse. All questions answered regarding Case Management Discharge Planning, patient verbalized understanding.  Discharge booklet with CM's contact information given to patient.    Caroline Perez RN  Case Management  [facility]  [phone]

## 2024-02-12 NOTE — HPI
Ms. Strong is a 79 year old female with a PMH of coronary artery disease, hyperlipidemia, hypertension, aortic stenosis status post TAVR (2019), diabetes, has CVA, HFpEF, atrial fibrillation, and a permanent pacemaker who presents as a direct admit from cardiology clinic due to SOB concerning for CHF exacerbation and suspected COPD exacerbation. She is presenting with her daughter who is present at bedside. Patient reports that she has increasing SOB on exertion in the past month or so. She reports that she takes 20 steps then feels like she needs to take a break. Patient reports that she also has chest pain at rest. Patient reports that she did not try to take her nitroglycerin because the pain comes and goes and its hard to pin point. Patient's  manages most of her medication. She reports that she has been compliant and has not missed any doses.    Cardiology is being consulted in setting of evaluation for newly reduced EF 35% from 60 % back in September of 2023. Patient denies any new medication use. She denies alcohol and illicit substance use. Patient denies abdominal pain, fever, chills, n/v/d, syncope, and blurry vision.

## 2024-02-12 NOTE — PROGRESS NOTES
[facility] ([place] [id number])  Cardiology  Progress Note    Patient Name: Adriana Strong  MRN: [id number]  Admission Date: 2/9/2024  Hospital Length of Stay: 1 days  Code Status: Full Code   Attending Physician: Guillermina Kumar MD   Primary Care Physician: Krzysztof Mcgraw MD  Expected Discharge Date: 2/14/2024  Principal Problem:<principal problem not specified>    Subjective:     Hospital Course:   No notes on file        Review of Systems   Constitutional: Negative for chills and fever.   HENT:  Negative for sore throat.    Eyes:  Negative for visual disturbance.   Cardiovascular:  Positive for chest pain, leg swelling, orthopnea and palpitations. Negative for syncope.   Respiratory:  Positive for cough, shortness of breath and wheezing.    Gastrointestinal:  Negative for abdominal pain, constipation, diarrhea, nausea and vomiting.   Genitourinary:  Negative for dysuria and hematuria.   Psychiatric/Behavioral:  Negative for altered mental status and depression.      Objective:     Vital Signs (Most Recent):  Temp: 98.1 °F (36.7 °C) (02/12/24 1605)  Pulse: 69 (02/12/24 1605)  Resp: 16 (02/12/24 1605)  BP: 126/60 (02/12/24 1605)  SpO2: 99 % (02/12/24 1605) Vital Signs (24h Range):  Temp:  [97.4 °F (36.3 °C)-98.4 °F (36.9 °C)] 98.1 °F (36.7 °C)  Pulse:  [66-70] 69  Resp:  [16-18] 16  SpO2:  [96 %-100 %] 99 %  BP: (126-165)/(58-72) 126/60     Weight: 102.5 kg (226 lb)  Body mass index is 38.79 kg/m².     SpO2: 99 %         Intake/Output Summary (Last 24 hours) at 2/12/2024 1721  Last data filed at 2/12/2024 1529  Gross per 24 hour   Intake --   Output 1600 ml   Net -1600 ml       Lines/Drains/Airways       Drain  Duration             Female External Urinary Catheter w/ Suction 02/09/24 2024 2 days              Peripheral Intravenous Line  Duration                  Peripheral IV - Single Lumen 02/09/24 1706 22 G Posterior;Right Hand 3 days                       Physical Exam  Constitutional:        Appearance: Normal appearance. She is obese.   HENT:      Head: Normocephalic and atraumatic.   Eyes:      Extraocular Movements: Extraocular movements intact.   Cardiovascular:      Rate and Rhythm: Normal rate and regular rhythm.      Pulses: Normal pulses.      Heart sounds: Normal heart sounds.   Pulmonary:      Breath sounds: Wheezing present.      Comments: Crackles auscultated b/t  Abdominal:      General: There is no distension.      Palpations: Abdomen is soft.      Tenderness: There is no abdominal tenderness.   Musculoskeletal:         General: Swelling present.   Skin:     General: Skin is warm.      Capillary Refill: Capillary refill takes less than 2 seconds.   Neurological:      General: No focal deficit present.      Mental Status: She is alert and oriented to person, place, and time.            Significant Labs: All pertinent lab results from the last 24 hours have been reviewed.      Assessment and Plan:     Brief HPI: Ms. Strong is a 79 year old female with a PMH of coronary artery disease, hyperlipidemia, hypertension, aortic stenosis status post TAVR (2019), diabetes, has CVA, HFpEF, atrial fibrillation, and a permanent pacemaker who presents as a direct admit from cardiology clinic due to SOB concerning for CHF exacerbation and suspected COPD exacerbation. She is presenting with her daughter who is present at bedside. Patient reports that she has increasing SOB on exertion in the past month or so. She reports that she takes 20 steps then feels like she needs to take a break. Patient reports that she also has chest pain at rest. Patient reports that she did not try to take her nitroglycerin because the pain comes and goes and its hard to pin point. Patient's  manages most of her medication. She reports that she has been compliant and has not missed any doses.    Cardiology is being consulted in setting of evaluation for newly reduced EF 35% from 60 % back in September of 2023. Patient  denies any new medication use. She denies alcohol and illicit substance use. Patient denies abdominal pain, fever, chills, n/v/d, syncope, and blurry vision.       Acute on chronic systolic heart failure  Patient is presenting from cardiology clinic for suspected CHF exacerbation and underlying COPD exacerbation as well. Patient reports that her symptoms began 1 month ago. She reports no inciting factor and reports progressive onset. Patient has WHITLEY and a dry cough. Patient reports that she has been adherent to her medication regimen and has no problems with it.     Patient troponin's wnl, BNP is 210 near baseline. Reviewed EKG which reveals ventricular paced rhythm. Patient's last echo on 2/9/24 revealed newly reduced ejection fraction at 35%. Patient has history of permanent pacemaker placement in 2020. And she has a history of TAVR in 2019.    Patient is RV paced. Upon review of EKG it appears she is BV paced. But the CXR does not reveal CS leads in light of these findings. We suspect there to be a pacemaker induced heart failure component and would like to involve EP in light of these findings and possibility of upgrading leads.  Echo    Interpretation Summary    Left Ventricle: The left ventricle is normal in size. Normal wall thickness. There is concentric remodeling. Regional wall motion abnormalities present. There is moderately reduced systolic function with a visually estimated ejection fraction of 35 - 40%. Ejection fraction by visual approximation is 35%. There is normal diastolic function.    Right Ventricle: Normal right ventricular cavity size. Wall thickness is normal. Right ventricle wall motion  is normal. Systolic function is normal. Pacemaker lead present in the ventricle.    Left Atrium: Left atrium is moderately dilated.    Right Atrium: Right atrium is mildly dilated. Lead present in the right atrium.    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 Aortic  valve area by VTI is 1.03 cm². Aortic valve peak velocity is 2.07 m/s. Mean gradient is 11 mmHg. The dimensionless index is 0.30. There is trace aortic regurgitation.    Mitral Valve: There is mild mitral annular calcification present.    Tricuspid Valve: There is mild regurgitation.    Pulmonary Artery: There is mild to moderate pulmonary hypertension. The estimated pulmonary artery systolic pressure is 47 mmHg.    IVC/SVC: Normal venous pressure at 3 mmHg.     Recommendations:   - Stopped Diltiazem in setting of newly found reduced EF  - Start patient on GDMT in light of new EF. Can switch losartan to Entresto, start jardiance, MRA, and continue BB   - Consult EP for PPM evaluation and possibly upgrading leads   - Recommend PET STRESS while patient is hospitalized           VTE Risk Mitigation (From admission, onward)           Ordered     apixaban tablet 5 mg  2 times daily         02/09/24 2110                    Vinh Monroy DO  Cardiology  [facility] ([place])

## 2024-02-12 NOTE — ASSESSMENT & PLAN NOTE
- IP CHF Pathway initiated  - continue lasix 80mg iv BID  - continue home plavix, BB and statin  - TTE 2/24: Regional wall motion abnormalities present. There is moderately reduced systolic function with a visually estimated ejection fraction of 35 - 40%   - Patient noted to have reduced EF compared to 55-60% on TTE obtained on 9/23.  - Consulted cardiology

## 2024-02-12 NOTE — PLAN OF CARE
Problem: Adult Inpatient Plan of Care  Goal: Plan of Care Review  Outcome: Ongoing, Progressing  Goal: Patient-Specific Goal (Individualized)  Outcome: Ongoing, Progressing  Goal: Absence of Hospital-Acquired Illness or Injury  Outcome: Ongoing, Progressing  Goal: Optimal Comfort and Wellbeing  Outcome: Ongoing, Progressing  Goal: Readiness for Transition of Care  Outcome: Ongoing, Progressing     Problem: Diabetes Comorbidity  Goal: Blood Glucose Level Within Targeted Range  Outcome: Ongoing, Progressing     Problem: Fluid and Electrolyte Imbalance (Acute Kidney Injury/Impairment)  Goal: Fluid and Electrolyte Balance  Outcome: Ongoing, Progressing     Problem: Oral Intake Inadequate (Acute Kidney Injury/Impairment)  Goal: Optimal Nutrition Intake  Outcome: Ongoing, Progressing     Problem: Renal Function Impairment (Acute Kidney Injury/Impairment)  Goal: Effective Renal Function  Outcome: Ongoing, Progressing     Problem: Skin Injury Risk Increased  Goal: Skin Health and Integrity  Outcome: Ongoing, Progressing     Patient alert and oriented. Cooperative with care. Medicated as ordered. Low output this shift. Encouraged to drink as patient was way under fluid restriction. Bladder scan showed 44cc. MD aware. Family at bedside. Patient resting comfortably at this time

## 2024-02-12 NOTE — ASSESSMENT & PLAN NOTE
Patient is presenting from cardiology clinic for suspected CHF exacerbation and underlying COPD exacerbation as well. Patient reports that her symptoms began 1 month ago. She reports no inciting factor and reports progressive onset. Patient has WHITLEY and a dry cough. Patient reports that she has been adherent to her medication regimen and has no problems with it.     Patient troponin's wnl, BNP is 210 near baseline. Reviewed EKG which reveals ventricular paced rhythm. Patient's last echo on 2/9/24 revealed newly reduced ejection fraction at 35%. Patient has history of permanent pacemaker placement in 2020. And she has a history of TAVR in 2019.    Patient is RV paced. Upon review of EKG it appears she is BV paced. But the CXR does not reveal CS leads in light of these findings. We suspect there to be a pacemaker induced heart failure component and would like to involve EP in light of these findings and possibility of upgrading leads.  Echo    Interpretation Summary    Left Ventricle: The left ventricle is normal in size. Normal wall thickness. There is concentric remodeling. Regional wall motion abnormalities present. There is moderately reduced systolic function with a visually estimated ejection fraction of 35 - 40%. Ejection fraction by visual approximation is 35%. There is normal diastolic function.    Right Ventricle: Normal right ventricular cavity size. Wall thickness is normal. Right ventricle wall motion  is normal. Systolic function is normal. Pacemaker lead present in the ventricle.    Left Atrium: Left atrium is moderately dilated.    Right Atrium: Right atrium is mildly dilated. Lead present in the right atrium.    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 Aortic valve area by VTI is 1.03 cm². Aortic valve peak velocity is 2.07 m/s. Mean gradient is 11 mmHg. The dimensionless index is 0.30. There is trace aortic regurgitation.    Mitral Valve: There is mild mitral  annular calcification present.    Tricuspid Valve: There is mild regurgitation.    Pulmonary Artery: There is mild to moderate pulmonary hypertension. The estimated pulmonary artery systolic pressure is 47 mmHg.    IVC/SVC: Normal venous pressure at 3 mmHg.     Recommendations:   - Stopped Diltiazem in setting of newly found reduced EF  - Start patient on GDMT in light of new EF. Can switch losartan to Entresto, start jardiance, MRA, and continue BB   - Consult EP for PPM evaluation and possibly upgrading leads   - Recommend PET STRESS while patient is hospitalized

## 2024-02-12 NOTE — PROGRESS NOTES
[facility] ([place] [address])  [facility] Medicine  Progress Note    Patient Name: Adriana Strong  MRN: [id number]  Patient Class: IP- Inpatient   Admission Date: 2/9/2024  Length of Stay: 1 days  Attending Physician: Guillermina Kumar MD  Primary Care Provider: Krzysztof Mcgraw MD        Subjective:     Principal Problem:<principal problem not specified>        HPI:  79F pmhx HLD, HTN, DM2, s/p TAVR (2019), CAD (pre TAVR 70% iLAD lesion), HFpEF, atrial fibrillation, PPM, COPD previously on home 2L home O2 but seems to not recently been using who presents to the ED from Cardiology clinic for concern of CHF exacerbation and worsening WHITLEY in setting of COPD. Patient reports she cannot walk across the room without getting SOB. Reports increasing bilateral leg swelling. Needs several pillows and 45 degree incline to sleep. Wheezing and using duonebs prn a couple times a week. Also reports chest tightness that occurs 1-5 minutes at a time, at random, unassociated with exercise nor eating, denies heartburn. Denies palpitations, lightheadedness. Patient reports compliance with diuretics and cardiac meds (lasix 40mg am, 20mg pm). Does not add salt to diet nor eats fried foods.    In the ED patient afebrile and hemodynamically stable saturating well on 2L NC.  and significant bilateral pitting edema. CXR without overt acute process. Patient with bilateral rales and slight wheezing. Started on lasix iv and given xopenex x1. Admitted to the care of medicine for further evaluation and management.     Overview/Hospital Course:  Pt sent to ED from cardio to be admitted for acute on chronic HF and exercise intolerance , started on iv lasix and O2 per protocol. Echo 2/10: Regional wall motion abnormalities present. There is moderately reduced systolic function with a visually estimated ejection fraction of 35 - 40% . US is neg for SVT     Interval History: NAEON, diuresing well, UOP 5.6L. Weaned down to 3L supplemental  oxygen via NC. Home oxygen 2L. TTE with EF reduced to 35%-40% compared to 60% in 9/23. Cardiology consulted.       Review of Systems   Constitutional:  Positive for fatigue. Negative for appetite change.   Respiratory:  Positive for shortness of breath. Negative for cough.    Cardiovascular:  Positive for leg swelling. Negative for chest pain.   Gastrointestinal:  Negative for abdominal distention, abdominal pain, constipation and diarrhea.   Genitourinary:  Negative for difficulty urinating and dysuria.   Neurological:  Negative for dizziness and headaches.     Objective:     Vital Signs (Most Recent):  Temp: 97.9 °F (36.6 °C) (02/12/24 1048)  Pulse: 69 (02/12/24 1111)  Resp: 18 (02/12/24 1048)  BP: 132/62 (02/12/24 1048)  SpO2: 98 % (02/12/24 1048) Vital Signs (24h Range):  Temp:  [97.4 °F (36.3 °C)-98.4 °F (36.9 °C)] 97.9 °F (36.6 °C)  Pulse:  [66-71] 69  Resp:  [16-18] 18  SpO2:  [96 %-100 %] 98 %  BP: (122-165)/(58-72) 132/62     Weight: 102.5 kg (226 lb)  Body mass index is 38.79 kg/m².    Intake/Output Summary (Last 24 hours) at 2/12/2024 1248  Last data filed at 2/11/2024 2140  Gross per 24 hour   Intake --   Output 1900 ml   Net -1900 ml           Physical Exam  Constitutional:       Appearance: Normal appearance.   HENT:      Head: Normocephalic and atraumatic.      Mouth/Throat:      Mouth: Mucous membranes are moist.   Cardiovascular:      Rate and Rhythm: Normal rate and regular rhythm.      Pulses: Normal pulses.      Heart sounds: Normal heart sounds.   Pulmonary:      Effort: Pulmonary effort is normal.      Breath sounds: Rales present.   Abdominal:      General: Abdomen is flat. Bowel sounds are normal. There is no distension.      Palpations: Abdomen is soft.      Tenderness: There is no abdominal tenderness.   Musculoskeletal:         General: Normal range of motion.      Cervical back: Normal range of motion and neck supple.      Right lower leg: Edema present.      Left lower leg: Edema  present.   Skin:     General: Skin is warm and dry.   Neurological:      General: No focal deficit present.      Mental Status: She is alert and oriented to person, place, and time.   Psychiatric:         Mood and Affect: Mood normal.             Significant Labs: All pertinent labs within the past 24 hours have been reviewed.    Significant Imaging: I have reviewed all pertinent imaging results/findings within the past 24 hours.    Assessment/Plan:      Acute on chronic systolic heart failure  - IP CHF Pathway initiated  - continue lasix 80mg iv BID  - continue home plavix, BB and statin  - TTE 2/24: Regional wall motion abnormalities present. There is moderately reduced systolic function with a visually estimated ejection fraction of 35 - 40%   - Patient noted to have reduced EF compared to 55-60% on TTE obtained on 9/23.  - Consulted cardiology      S/P TAVR (transcatheter aortic valve replacement)  - continue home meds      COPD (chronic obstructive pulmonary disease)  - current symptoms felt more secondary to CHF over COPD exacerbation. Though potentially progressive chronic COPD changes without acute exacerbation may also be etiology  - previously on home 2L O2 after 6min walk test  - currently saturating well on 4L O2  - xopenex x1 in ED  - Duonebs q4h prn  - continue home LABA-ICS and symbicort    Persistent atrial fibrillation  - continue home Eliquis  - continue home coreg and diltiazem    Essential hypertension  - continue home meds coreg, losartan, diltiazem    Type 2 diabetes mellitus  - SSI  - hypoglycemic protocol      VTE Risk Mitigation (From admission, onward)           Ordered     apixaban tablet 5 mg  2 times daily         02/09/24 2110                    Discharge Planning   MIMI: 2/14/2024     Code Status: Full Code   Is the patient medically ready for discharge?:     Reason for patient still in hospital (select all that apply): Patient new problem                     Seemal José,  MD  Department of Hospital Medicine   [facility] ([place])

## 2024-02-12 NOTE — PLAN OF CARE
Problem: Adult Inpatient Plan of Care  Goal: Plan of Care Review  Outcome: Ongoing, Progressing  Goal: Patient-Specific Goal (Individualized)  Outcome: Ongoing, Progressing  Goal: Absence of Hospital-Acquired Illness or Injury  Outcome: Ongoing, Progressing  Goal: Optimal Comfort and Wellbeing  Outcome: Ongoing, Progressing  Goal: Readiness for Transition of Care  Outcome: Ongoing, Progressing     Problem: Diabetes Comorbidity  Goal: Blood Glucose Level Within Targeted Range  Outcome: Ongoing, Progressing     Problem: Fluid and Electrolyte Imbalance (Acute Kidney Injury/Impairment)  Goal: Fluid and Electrolyte Balance  Outcome: Ongoing, Progressing     Problem: Oral Intake Inadequate (Acute Kidney Injury/Impairment)  Goal: Optimal Nutrition Intake  Outcome: Ongoing, Progressing     Problem: Renal Function Impairment (Acute Kidney Injury/Impairment)  Goal: Effective Renal Function  Outcome: Ongoing, Progressing    Shift note: oxygen titrated down to 3L.  Pain controlled. Lasix given. External catheter in use. Pt slept through out the night. Family at bedside. Patient needs met. Bed wheels locked. Call light in reach.

## 2024-02-13 LAB
ANION GAP SERPL CALC-SCNC: 7 MMOL/L (ref 8–16)
ANION GAP SERPL CALC-SCNC: 9 MMOL/L (ref 8–16)
BASOPHILS # BLD AUTO: 0.03 K/UL (ref 0–0.2)
BASOPHILS NFR BLD: 0.4 % (ref 0–1.9)
BUN SERPL-MCNC: 18 MG/DL (ref 8–23)
BUN SERPL-MCNC: 19 MG/DL (ref 8–23)
CALCIUM SERPL-MCNC: 8.9 MG/DL (ref 8.7–10.5)
CALCIUM SERPL-MCNC: 9 MG/DL (ref 8.7–10.5)
CHLORIDE SERPL-SCNC: 93 MMOL/L (ref 95–110)
CHLORIDE SERPL-SCNC: 95 MMOL/L (ref 95–110)
CO2 SERPL-SCNC: 32 MMOL/L (ref 23–29)
CO2 SERPL-SCNC: 34 MMOL/L (ref 23–29)
CREAT SERPL-MCNC: 0.7 MG/DL (ref 0.5–1.4)
CREAT SERPL-MCNC: 0.9 MG/DL (ref 0.5–1.4)
DIFFERENTIAL METHOD BLD: ABNORMAL
EOSINOPHIL # BLD AUTO: 0 K/UL (ref 0–0.5)
EOSINOPHIL NFR BLD: 0.4 % (ref 0–8)
ERYTHROCYTE [DISTWIDTH] IN BLOOD BY AUTOMATED COUNT: 15.3 % (ref 11.5–14.5)
EST. GFR  (NO RACE VARIABLE): >60 ML/MIN/1.73 M^2
EST. GFR  (NO RACE VARIABLE): >60 ML/MIN/1.73 M^2
GLUCOSE SERPL-MCNC: 115 MG/DL (ref 70–110)
GLUCOSE SERPL-MCNC: 133 MG/DL (ref 70–110)
HCT VFR BLD AUTO: 37.2 % (ref 37–48.5)
HGB BLD-MCNC: 12 G/DL (ref 12–16)
IMM GRANULOCYTES # BLD AUTO: 0.02 K/UL (ref 0–0.04)
IMM GRANULOCYTES NFR BLD AUTO: 0.3 % (ref 0–0.5)
LYMPHOCYTES # BLD AUTO: 1.6 K/UL (ref 1–4.8)
LYMPHOCYTES NFR BLD: 23 % (ref 18–48)
MAGNESIUM SERPL-MCNC: 1.7 MG/DL (ref 1.6–2.6)
MCH RBC QN AUTO: 30.6 PG (ref 27–31)
MCHC RBC AUTO-ENTMCNC: 32.3 G/DL (ref 32–36)
MCV RBC AUTO: 95 FL (ref 82–98)
MONOCYTES # BLD AUTO: 0.8 K/UL (ref 0.3–1)
MONOCYTES NFR BLD: 12.1 % (ref 4–15)
NEUTROPHILS # BLD AUTO: 4.3 K/UL (ref 1.8–7.7)
NEUTROPHILS NFR BLD: 63.8 % (ref 38–73)
NRBC BLD-RTO: 0 /100 WBC
PLATELET # BLD AUTO: 216 K/UL (ref 150–450)
PMV BLD AUTO: 10.7 FL (ref 9.2–12.9)
POCT GLUCOSE: 110 MG/DL (ref 70–110)
POCT GLUCOSE: 131 MG/DL (ref 70–110)
POCT GLUCOSE: 140 MG/DL (ref 70–110)
POCT GLUCOSE: 144 MG/DL (ref 70–110)
POTASSIUM SERPL-SCNC: 3.5 MMOL/L (ref 3.5–5.1)
POTASSIUM SERPL-SCNC: 3.9 MMOL/L (ref 3.5–5.1)
RBC # BLD AUTO: 3.92 M/UL (ref 4–5.4)
SODIUM SERPL-SCNC: 134 MMOL/L (ref 136–145)
SODIUM SERPL-SCNC: 136 MMOL/L (ref 136–145)
TROPONIN I SERPL DL<=0.01 NG/ML-MCNC: 0.01 NG/ML (ref 0–0.03)
WBC # BLD AUTO: 6.77 K/UL (ref 3.9–12.7)

## 2024-02-13 PROCEDURE — 36415 COLL VENOUS BLD VENIPUNCTURE: CPT | Mod: XB | Performed by: STUDENT IN AN ORGANIZED HEALTH CARE EDUCATION/TRAINING PROGRAM

## 2024-02-13 PROCEDURE — 25000003 PHARM REV CODE 250: Performed by: STUDENT IN AN ORGANIZED HEALTH CARE EDUCATION/TRAINING PROGRAM

## 2024-02-13 PROCEDURE — 99900035 HC TECH TIME PER 15 MIN (STAT)

## 2024-02-13 PROCEDURE — 80048 BASIC METABOLIC PNL TOTAL CA: CPT | Performed by: FAMILY MEDICINE

## 2024-02-13 PROCEDURE — 84484 ASSAY OF TROPONIN QUANT: CPT | Performed by: STUDENT IN AN ORGANIZED HEALTH CARE EDUCATION/TRAINING PROGRAM

## 2024-02-13 PROCEDURE — 93005 ELECTROCARDIOGRAM TRACING: CPT

## 2024-02-13 PROCEDURE — 93010 ELECTROCARDIOGRAM REPORT: CPT | Mod: ,,, | Performed by: INTERNAL MEDICINE

## 2024-02-13 PROCEDURE — 63600175 PHARM REV CODE 636 W HCPCS: Performed by: STUDENT IN AN ORGANIZED HEALTH CARE EDUCATION/TRAINING PROGRAM

## 2024-02-13 PROCEDURE — 20600001 HC STEP DOWN PRIVATE ROOM

## 2024-02-13 PROCEDURE — 25000003 PHARM REV CODE 250: Performed by: FAMILY MEDICINE

## 2024-02-13 PROCEDURE — 25000242 PHARM REV CODE 250 ALT 637 W/ HCPCS: Performed by: STUDENT IN AN ORGANIZED HEALTH CARE EDUCATION/TRAINING PROGRAM

## 2024-02-13 PROCEDURE — 36415 COLL VENOUS BLD VENIPUNCTURE: CPT | Performed by: FAMILY MEDICINE

## 2024-02-13 PROCEDURE — 80048 BASIC METABOLIC PNL TOTAL CA: CPT | Mod: 91 | Performed by: STUDENT IN AN ORGANIZED HEALTH CARE EDUCATION/TRAINING PROGRAM

## 2024-02-13 PROCEDURE — 25000003 PHARM REV CODE 250: Performed by: HOSPITALIST

## 2024-02-13 PROCEDURE — 99233 SBSQ HOSP IP/OBS HIGH 50: CPT | Mod: GC,,, | Performed by: INTERNAL MEDICINE

## 2024-02-13 PROCEDURE — 99233 SBSQ HOSP IP/OBS HIGH 50: CPT | Mod: ,,, | Performed by: INTERNAL MEDICINE

## 2024-02-13 PROCEDURE — 83735 ASSAY OF MAGNESIUM: CPT | Performed by: STUDENT IN AN ORGANIZED HEALTH CARE EDUCATION/TRAINING PROGRAM

## 2024-02-13 PROCEDURE — 63600175 PHARM REV CODE 636 W HCPCS: Performed by: FAMILY MEDICINE

## 2024-02-13 PROCEDURE — 94640 AIRWAY INHALATION TREATMENT: CPT

## 2024-02-13 PROCEDURE — 94761 N-INVAS EAR/PLS OXIMETRY MLT: CPT

## 2024-02-13 PROCEDURE — 27000221 HC OXYGEN, UP TO 24 HOURS

## 2024-02-13 PROCEDURE — 85025 COMPLETE CBC W/AUTO DIFF WBC: CPT | Performed by: STUDENT IN AN ORGANIZED HEALTH CARE EDUCATION/TRAINING PROGRAM

## 2024-02-13 RX ORDER — SPIRONOLACTONE 25 MG/1
25 TABLET ORAL DAILY
Status: DISCONTINUED | OUTPATIENT
Start: 2024-02-13 | End: 2024-02-14

## 2024-02-13 RX ORDER — MORPHINE SULFATE 4 MG/ML
3 INJECTION, SOLUTION INTRAMUSCULAR; INTRAVENOUS ONCE
Status: COMPLETED | OUTPATIENT
Start: 2024-02-13 | End: 2024-02-13

## 2024-02-13 RX ADMIN — SACUBITRIL AND VALSARTAN 1 TABLET: 24; 26 TABLET, FILM COATED ORAL at 08:02

## 2024-02-13 RX ADMIN — MONTELUKAST 10 MG: 10 TABLET, FILM COATED ORAL at 08:02

## 2024-02-13 RX ADMIN — CARVEDILOL 12.5 MG: 12.5 TABLET, FILM COATED ORAL at 08:02

## 2024-02-13 RX ADMIN — PANTOPRAZOLE SODIUM 40 MG: 40 TABLET, DELAYED RELEASE ORAL at 08:02

## 2024-02-13 RX ADMIN — ISOSORBIDE MONONITRATE 60 MG: 30 TABLET, EXTENDED RELEASE ORAL at 08:02

## 2024-02-13 RX ADMIN — APIXABAN 5 MG: 5 TABLET, FILM COATED ORAL at 08:02

## 2024-02-13 RX ADMIN — RANOLAZINE 1000 MG: 1000 TABLET, FILM COATED, EXTENDED RELEASE ORAL at 08:02

## 2024-02-13 RX ADMIN — SPIRONOLACTONE 25 MG: 25 TABLET, FILM COATED ORAL at 02:02

## 2024-02-13 RX ADMIN — DULOXETINE HYDROCHLORIDE 20 MG: 20 CAPSULE, DELAYED RELEASE ORAL at 09:02

## 2024-02-13 RX ADMIN — FLUTICASONE FUROATE AND VILANTEROL TRIFENATATE 1 PUFF: 100; 25 POWDER RESPIRATORY (INHALATION) at 09:02

## 2024-02-13 RX ADMIN — CLOPIDOGREL BISULFATE 75 MG: 75 TABLET ORAL at 08:02

## 2024-02-13 RX ADMIN — POLYETHYLENE GLYCOL 3350 17 G: 17 POWDER, FOR SOLUTION ORAL at 08:02

## 2024-02-13 RX ADMIN — SACUBITRIL AND VALSARTAN 1 TABLET: 24; 26 TABLET, FILM COATED ORAL at 09:02

## 2024-02-13 RX ADMIN — ATORVASTATIN CALCIUM 10 MG: 10 TABLET, FILM COATED ORAL at 08:02

## 2024-02-13 RX ADMIN — MORPHINE SULFATE 3 MG: 4 INJECTION INTRAVENOUS at 03:02

## 2024-02-13 RX ADMIN — LORAZEPAM 0.5 MG: 0.5 TABLET ORAL at 07:02

## 2024-02-13 RX ADMIN — GABAPENTIN 600 MG: 300 CAPSULE ORAL at 02:02

## 2024-02-13 RX ADMIN — GABAPENTIN 600 MG: 300 CAPSULE ORAL at 08:02

## 2024-02-13 RX ADMIN — EMPAGLIFLOZIN 10 MG: 10 TABLET, FILM COATED ORAL at 09:02

## 2024-02-13 RX ADMIN — SENNOSIDES 8.6 MG: 8.6 TABLET, FILM COATED ORAL at 08:02

## 2024-02-13 RX ADMIN — FUROSEMIDE 80 MG: 10 INJECTION, SOLUTION INTRAVENOUS at 08:02

## 2024-02-13 NOTE — SUBJECTIVE & OBJECTIVE
Interval History:   Patient reports an episode of chest tightness this morning which resolved. Patient reports that her SOB has improved, noting when she went to the restroom to urinate she was not out of breath. Consulted EP in setting of PPM evaluation due to newly reduced EF found. Patient currently on 3 L of oxygen, bl home requirement is 2 L.     Review of Systems   Constitutional: Negative for chills, fever and malaise/fatigue.   HENT:  Negative for sore throat.    Eyes:  Negative for blurred vision and visual disturbance.   Cardiovascular:  Positive for chest pain, dyspnea on exertion, leg swelling, orthopnea and palpitations. Negative for syncope.   Respiratory:  Positive for cough and shortness of breath. Negative for hemoptysis and wheezing.    Gastrointestinal:  Negative for abdominal pain, constipation, diarrhea, nausea and vomiting.   Genitourinary:  Negative for dysuria and hematuria.   Psychiatric/Behavioral:  Negative for altered mental status, depression and hallucinations.      Objective:     Vital Signs (Most Recent):  Temp: 97.4 °F (36.3 °C) (02/13/24 0725)  Pulse: 69 (02/13/24 0725)  Resp: 18 (02/13/24 0725)  BP: (!) 120/58 (02/13/24 0725)  SpO2: 98 % (02/13/24 0725) Vital Signs (24h Range):  Temp:  [97.4 °F (36.3 °C)-98.4 °F (36.9 °C)] 97.4 °F (36.3 °C)  Pulse:  [69-71] 69  Resp:  [16-18] 18  SpO2:  [96 %-99 %] 98 %  BP: (120-150)/(58-67) 120/58     Weight: 102.5 kg (226 lb)  Body mass index is 38.79 kg/m².     SpO2: 98 %         Intake/Output Summary (Last 24 hours) at 2/13/2024 0932  Last data filed at 2/13/2024 0716  Gross per 24 hour   Intake 390 ml   Output 2325 ml   Net -1935 ml         Lines/Drains/Airways       Drain  Duration             Female External Urinary Catheter w/ Suction 02/09/24 2024 3 days              Peripheral Intravenous Line  Duration                  Peripheral IV - Single Lumen 02/09/24 1706 22 G Posterior;Right Hand 3 days                       Physical  Exam  Constitutional:       Appearance: Normal appearance. She is obese.   HENT:      Head: Normocephalic and atraumatic.   Eyes:      Extraocular Movements: Extraocular movements intact.   Cardiovascular:      Rate and Rhythm: Normal rate and regular rhythm.      Pulses: Normal pulses.      Heart sounds: Normal heart sounds.   Pulmonary:      Breath sounds: Wheezing present.      Comments: Crackles auscultated b/t  Abdominal:      General: There is no distension.      Palpations: Abdomen is soft.      Tenderness: There is no abdominal tenderness.   Musculoskeletal:         General: Swelling present.   Skin:     General: Skin is warm.      Capillary Refill: Capillary refill takes less than 2 seconds.   Neurological:      General: No focal deficit present.      Mental Status: She is alert and oriented to person, place, and time.            Significant Labs: All pertinent lab results from the last 24 hours have been reviewed.

## 2024-02-13 NOTE — CONSULTS
[facility] ([place] [id number])  Cardiac Electrophysiology  Consult Note    Admission Date: 2/9/2024  Code Status: Full Code   Attending Provider: Guillermina Kumar MD  Consulting Provider: Cali Arshad MD  Principal Problem:<principal problem not specified>    Inpatient consult to Electrophysiology  Consult performed by: Cali Peguero MD  Consult ordered by: Harley Espino MD        Subjective:     Chief Complaint:  Pacemaker induced cardiomyopathy     HPI:   78F pmhx HLD, HTN, DM2, s/p TAVR (2019), CAD (pre TAVR 70% iLAD lesion), HFpEF, atrial fibrillation on Apixaban and carvedilol, Biotronik PPM (placed at OSH for bradycardia in 2021, intermittently VVI paced), and COPD, who was seen in the outpatient clinic on 02/09/2024 and sent to the ED the same day due to SOB, increased bilateral LE swelling, orthopnea, wheezing and chest tightness for 1-5 minutes, even though she reports compliance with diuretics, diet and cardiac meds. Admitted with diagnosis of acute heart failure and COPD. Patient was started on furosemide as inpatient, New TTE showed EF 35-40% with regional wall motion abnormalities (akinesis of mid anteroseptal, apical septal, apical inferior and apex and hypokinesis of mid inferoseptal, mid inferior and apical anterior). Continued diuresis and started on GDMT.    EP consulted for concern of pacing induced HF.     Past Medical History:   Diagnosis Date    Acute on chronic diastolic (congestive) heart failure 11/20/2019    Anticoagulant long-term use     on Plavix since May 2015    Anxiety     Arthritis     Asthma     Atrial fibrillation     Atrial fibrillation with RVR 10/19/2020    Cataracts, bilateral     Chest pain, atypical     Chronic atrial fibrillation with rapid ventricular response 6/23/2017    Colon polyp     Coronary artery disease     Diabetes mellitus     Dry eyes     Esophageal erosions     GERD (gastroesophageal reflux disease)     Heart murmur      Hemorrhoid     High cholesterol     Hypertension     Irritable bowel syndrome     Paroxysmal atrial fibrillation 10/30/2020    Persistent atrial fibrillation 2/10/2020    Shingles 2015    Stenosis of aortic and mitral valves     Stroke     TIA (transient ischemic attack)     Use of cane as ambulatory aid        Past Surgical History:   Procedure Laterality Date    ABDOMINAL SURGERY      stents to SMA    angiocele      2007, 2014    AORTIC VALVE REPLACEMENT N/A 11/19/2019    Procedure: Replacement-valve-aortic;  Surgeon: Jack Capone MD;  Location: [facility] CATH LAB;  Service: Cardiology;  Laterality: N/A;    BREAST SURGERY      left--- a lump--- no cancer    CARDIAC VALVE SURGERY      COLONOSCOPY  2014    COLONOSCOPY N/A 3/14/2018    Procedure: COLONOSCOPY;  Surgeon: Efren Kemp MD;  Location: [facility] ENDO (4TH FLR);  Service: Endoscopy;  Laterality: N/A;  ok to hold Plavix 5 days prior to procedure per Dr RESHMA Hernandez     ok per Dr Kemp to hold Eliquis 2 days prior to procedure (see telephone encounter dated-2/7/18)    HERNIA REPAIR      HYSTERECTOMY  partial    1982 partial hysterectomy    LEFT HEART CATHETERIZATION Right 11/5/2019    Procedure: Left heart cath;  Surgeon: Jack Capone MD;  Location: [facility] CATH LAB;  Service: Cardiology;  Laterality: Right;    left nasal polyp      left neck lymph node      nose polyp      right hip fatty mass tissue      stent to small intestine      SUPERIOR VENA CAVA ANGIOPLASTY / STENTING      TONSILLECTOMY      TOTAL ABDOMINAL HYSTERECTOMY      2014    TOTAL KNEE ARTHROPLASTY Bilateral     TREATMENT OF CARDIAC ARRHYTHMIA N/A 2/10/2020    Procedure: CARDIOVERSION;  Surgeon: Jayy Parrish MD;  Location: [facility] EP LAB;  Service: Cardiology;  Laterality: N/A;  AF, PEDRO, DCCV, MAC, DM, 3 Prep    TREATMENT OF CARDIAC ARRHYTHMIA N/A 5/15/2020    Procedure: CARDIOVERSION;  Surgeon: Hany Bee MD;  Location: [facility] EP LAB;  Service: Cardiology;  Laterality: N/A;  AF, PEDRO, DCCV, MAC, DM,  3 Prep    UPPER GASTROINTESTINAL ENDOSCOPY  2014       Review of patient's allergies indicates:   Allergen Reactions    Celebrex [celecoxib] Shortness Of Breath    Ciprofloxacin Swelling     lip    Dicyclomine Hives    Adhesive Dermatitis    Avelox [moxifloxacin] Swelling    Cilostazol Other (See Comments)     Elevates blood pressure    Eryc [erythromycin] Other (See Comments)     unknown    Iodine and iodide containing products Hives    Keflex [cephalexin] Hives    Meclomen      rashes    Meloxicam      Ear ringing    Metoclopramide Other (See Comments)     High blood pressure    Sulfa (sulfonamide antibiotics) Itching       Current Facility-Administered Medications on File Prior to Encounter   Medication    0.9%  NaCl infusion    sodium chloride 0.9% flush 5 mL     Current Outpatient Medications on File Prior to Encounter   Medication Sig    acetaminophen (TYLENOL) 650 MG TbSR Take 1,300 mg by mouth 2 (two) times daily as needed.    albuterol-ipratropium (DUO-NEB) 2.5 mg-0.5 mg/3 mL nebulizer solution Take 3 mLs by nebulization every 4 (four) hours as needed for Wheezing or Shortness of Breath.    apixaban (ELIQUIS) 5 mg Tab TAKE 1 TABLET BY MOUTH TWICE A DAY    ascorbic acid, vitamin C, (VITAMIN C) 500 MG tablet Take 1,000 mg by mouth once daily.     atorvastatin (LIPITOR) 10 MG tablet Take 1 tablet (10 mg total) by mouth once daily.    calcium carbonate/vitamin D3 (CALTRATE 600 + D ORAL) Take 1 tablet by mouth once daily.    carvediloL (COREG) 12.5 MG tablet Take 1 tablet (12.5 mg total) by mouth 2 (two) times daily.    clopidogreL (PLAVIX) 75 mg tablet TAKE 1 TABLET BY MOUTH EVERY DAY    diltiaZEM (CARDIZEM CD) 240 MG 24 hr capsule TAKE 1 CAPSULE BY MOUTH ONCE A DAY    DULoxetine (CYMBALTA) 20 MG capsule Take 1 capsule (20 mg total) by mouth once daily.    empagliflozin (JARDIANCE) 10 mg tablet Take 1 tablet (10 mg total) by mouth once daily.    fexofenadine (ALLEGRA) 60 MG tablet Take 60 mg by mouth once  daily.    fish oil-omega-3 fatty acids 300-1,000 mg capsule Take 1 g by mouth once daily.     furosemide (LASIX) 40 MG tablet Take 40mg (1 tablet) in the morning and 20mg (1/2 tablet) in the afternoon    gabapentin (NEURONTIN) 600 MG tablet Take 600 mg by mouth 3 (three) times daily.    isosorbide mononitrate (IMDUR) 60 MG 24 hr tablet Take 1 tablet (60 mg total) by mouth once daily.    LORazepam (ATIVAN) 0.5 MG tablet Take 1 tablet (0.5 mg total) by mouth every morning.    losartan (COZAAR) 50 MG Tab Take 1 tablet (50 mg total) by mouth once daily.    metFORMIN (GLUCOPHAGE) 500 MG tablet Take 1 tablet (500 mg total) by mouth 2 (two) times daily.    montelukast (SINGULAIR) 10 mg tablet Take 10 mg by mouth once daily.    mv-mn/folic ac/calcium/vit K1 (WOMEN'S 50 PLUS MULTIVITAMIN ORAL) Take 1 tablet by mouth once daily.    nitroGLYCERIN (NITROSTAT) 0.4 MG SL tablet Place 1 tablet (0.4 mg total) under the tongue every 5 (five) minutes as needed for Chest pain.    pantoprazole (PROTONIX) 40 MG tablet Take 1 tablet (40 mg total) by mouth once daily.    potassium gluconate 550 mg (90 mg) Tab Take 180 mg by mouth 2 (two) times a day.    ranolazine (RANEXA) 1,000 mg Tb12 Take 1 tablet (1,000 mg total) by mouth 2 (two) times daily.    SYMBICORT 160-4.5 mcg/actuation HFAA Inhale 1 puff into the lungs 2 (two) times daily.     Family History       Problem Relation (Age of Onset)    Heart attack Mother    Heart failure Brother    Stroke Father          Tobacco Use    Smoking status: Never    Smokeless tobacco: Never   Substance and Sexual Activity    Alcohol use: No    Drug use: No    Sexual activity: Not Currently     Review of Systems   Constitutional: Negative.   HENT: Negative.     Cardiovascular:  Positive for dyspnea on exertion, leg swelling, orthopnea and paroxysmal nocturnal dyspnea. Negative for chest pain, claudication, cyanosis, irregular heartbeat, near-syncope, palpitations and syncope.   Respiratory: Negative.      Endocrine: Negative.    Musculoskeletal: Negative.    Gastrointestinal: Negative.    Genitourinary: Negative.    Neurological: Negative.      Objective:     Vital Signs (Most Recent):  Temp: 98.2 °F (36.8 °C) (02/13/24 0344)  Pulse: 69 (02/13/24 0344)  Resp: 18 (02/13/24 0344)  BP: 133/63 (02/13/24 0344)  SpO2: 96 % (02/13/24 0344) Vital Signs (24h Range):  Temp:  [97.9 °F (36.6 °C)-98.4 °F (36.9 °C)] 98.2 °F (36.8 °C)  Pulse:  [66-71] 69  Resp:  [16-18] 18  SpO2:  [96 %-99 %] 96 %  BP: (126-165)/(60-72) 133/63       Weight: 102.5 kg (226 lb)  Body mass index is 38.79 kg/m².    SpO2: 96 %        Telemetry: Seems to be mostly ventricular pacing 68bpm    Physical Exam  Vitals and nursing note reviewed.   Constitutional:       Appearance: Normal appearance.   HENT:      Head: Normocephalic and atraumatic.   Eyes:      Extraocular Movements: Extraocular movements intact.      Pupils: Pupils are equal, round, and reactive to light.   Neck:      Vascular: No carotid bruit.   Cardiovascular:      Rate and Rhythm: Normal rate and regular rhythm.      Pulses: Normal pulses.      Heart sounds: Murmur present in aortic area     No friction rub. No gallop.   Pulmonary:      Effort: Pulmonary effort is normal.      Breath sounds: Bilateral rales, crackles and wheezing  Abdominal:      General: Abdomen is flat. Bowel sounds are normal. There is no distension.      Palpations: Abdomen is soft. There is no mass.      Tenderness: There is no abdominal tenderness.   Musculoskeletal:         General: No swelling. Normal range of motion.      Cervical back: Normal range of motion.   Skin:     General: Skin is warm.      Capillary Refill: Capillary refill takes less than 2 seconds.   Neurological:      General: No focal deficit present except for hard of hearing     Mental Status: She is alert and oriented to person, place, and time.          Significant Labs:     Recent Labs   Lab 02/09/24  1708 02/10/24  0502 02/13/24  0452   WBC  7.02 6.99 6.77   HGB 12.8 12.2 12.0   HCT 40.3 38.4 37.2    222 216       Recent Labs   Lab 02/11/24  0554 02/12/24  0553 02/13/24  0452    134* 134*   K 3.6 3.5 3.5   CL 98 94* 93*   CO2 33* 30* 34*   BUN 18 17 18   CREATININE 0.8 0.7 0.7   CALCIUM 9.0 9.0 8.9       Recent Labs   Lab 02/09/24  1708   ALKPHOS 44*   BILITOT 0.3   PROT 7.4   ALT 14   AST 31     Recent Labs   Lab 02/09/24  1708   TROPONINI <0.006     Significant Imaging:     EKG 02/09/2024  - Afib, Ventricular paced rhythm 69bpm    EKG 10/27/2023  - Afib, Ventricular paced rhythm 71bpm    EKG 09/21/  - Afib, Ventricular paced rhythm 69bpm      TTE 02/10/2024    Left Ventricle: The left ventricle is normal in size. Normal wall thickness. There is concentric remodeling. Regional wall motion abnormalities present. There is moderately reduced systolic function with a visually estimated ejection fraction of 35 - 40%. Ejection fraction by visual approximation is 35%. There is normal diastolic function.    Right Ventricle: Normal right ventricular cavity size. Wall thickness is normal. Right ventricle wall motion  is normal. Systolic function is normal. Pacemaker lead present in the ventricle.    Left Atrium: Left atrium is moderately dilated.    Right Atrium: Right atrium is mildly dilated. Lead present in the right atrium.    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 Aortic valve area by VTI is 1.03 cm². Aortic valve peak velocity is 2.07 m/s. Mean gradient is 11 mmHg. The dimensionless index is 0.30. There is trace aortic regurgitation.    Mitral Valve: There is mild mitral annular calcification present.    Tricuspid Valve: There is mild regurgitation.    Pulmonary Artery: There is mild to moderate pulmonary hypertension. The estimated pulmonary artery systolic pressure is 47 mmHg.    IVC/SVC: Normal venous pressure at 3 mmHg.    PET stress 10/27/23    There is a very small (< 5%) sized, mild intensity  mid septal and anteroseptal reversible perfusion abnormality involving 3% of LV myocardium in the distribution of the LAD.    Within the perfusion abnormality, absolute myocardial perfusion (cc/min/gm) averaged 0.44 cc/min/g at rest, 0.47 cc/min/g at stress and CFR was 1.09 , which equates to severely reduced coronary flow capacity within the LAD territory.    There are no other significant perfusion abnormalities.    The whole heart absolute myocardial perfusion values averaged 0.59 cc/min/g at rest, which is reduced; 0.84 cc/min/g at stress, which is moderately reduced; and CFR is 1.43 , which is moderately reduced.    The stress flow is reduced diffusely throughout the heart with regions that are on the borderline of ischemic thresholds.    CT attenuation images demonstrate moderate diffuse coronary calcifications in the LAD, LCX, RCA and LM territory and moderate diffuse aortic calcifications in the ascending aorta, descending aorta and aortic arch.    The gated perfusion images showed an ejection fraction of 67% at rest and 67% during stress. A normal ejection fraction is greater than 47%.    The wall motion is normal at rest and during stress.    The LV cavity size is normal at rest and stress.    The ECG portion of the study is uninterpretable due to ventricular paced rhythm.    There were no arrhythmias during stress.    The patient reported chest pain during the stress test.    When compared to the previous study from 9/14/2022, there are significant changes.  There is now a stress induced perfusion abnormality, as described above.     TTE 09/22/23    Left Ventricle: The left ventricle is normal in size. Increased wall thickness. Septal motion is consistent with bundle branch block. There is normal systolic function with a visually estimated ejection fraction of 55 - 60%. Grade III diastolic dysfunction.    Right Ventricle: Normal right ventricular cavity size. Wall thickness is normal. Right ventricle wall  motion  is normal. Systolic function is normal. Pacemaker lead present in the ventricle.    Left Atrium: Left atrium is severely dilated.    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 It is reported to be a 26 mm Suzi S3 and Phan valve. Aortic valve area by VTI is 1.40 cm². Aortic valve peak velocity is 2.47 m/s. Mean gradient is 13 mmHg. The dimensionless index is 0.38. There is trace aortic regurgitation.    Pulmonary Artery: The estimated pulmonary artery systolic pressure is 44 mmHg.    IVC/SVC: Intermediate venous pressure at 8 mmHg.                Assessment and Plan:     Pacemaker  78F pmhx HLD, HTN, DM2, s/p TAVR (2019), CAD (pre TAVR 70% iLAD lesion), HFpEF, atrial fibrillation on Apixaban and carvedilol, Biotronik PPM (placed at OSH for bradycardia in 2021, intermittently VVI paced), and COPD, who was admitted to [facility] on 02/09/2024 with diagnosis of acute heart failure and COPD. Patient was started on furosemide as inpatient, and new TTE showed EF 35-40% with regional wall motion abnormalities (akinesis of mid anteroseptal, apical septal, apical inferior and apex and hypokinesis of mid inferoseptal, mid inferior and apical anterior). Continued diuresis and started on GDMT. Telemetry: Seems to be mostly ventricular pacing 68bpm  - EP consulted for concern of pacing induced HF.   - It is possible that pacing is inducing HF  - She was set in DDD-CLS mode at baseline rate of 70bpm, which would respond to fast atrial rhythm. Patient is on Afib.   - On evaluation of baseline rhythm, seems to be totally dependant on pacer. With afib and no escape ventricular rhythm, concerning for CHB.  - Changed mode to VVI at a rate of 60bpm  - Will follow up in clinic and assess ventricular pacing burden, if increase she might benefit from CRT    Recommendations  - Stop beta blockers and avoid AV izabella blockers  - Changed mode of PPM from DDD-CLS 70bpm to VVI 60bpm  - No more  interventions from EP standpoint  - Follow up as outpatient with Dr. Moeller in 3-4 weeks.    Thank you for your consult. I will sign off. Please contact us if you have any additional questions.    Cali Arshad MD  Cardiac Electrophysiology  [facility] ([place])

## 2024-02-13 NOTE — ASSESSMENT & PLAN NOTE
"Patient is presenting from cardiology clinic for suspected CHF exacerbation and underlying COPD exacerbation as well. Patient reports that her symptoms began 1 month ago. She reports no inciting factor and reports progressive onset. Patient has WHITLEY and a dry cough. Patient reports that she has been adherent to her medication regimen and has no problems with it.     Patient troponin's wnl, BNP is 210 near baseline. Reviewed EKG which reveals ventricular paced rhythm. Patient's last echo on 2/9/24 revealed newly reduced ejection fraction at 35%. Patient has history of permanent pacemaker placement in 2022. And she has a history of TAVR in 2019. PET stress in 2023 revealed "There is a very small (< 5%) sized, mild intensity mid septal and anteroseptal reversible perfusion abnormality involving 3% of LV myocardium in the distribution of the LAD"    Patient is RV paced. Upon review of EKG it appears she is BV paced. But the CXR does not reveal CS leads in light of these findings. We suspect there to be a pacemaker induced heart failure component and would like to involve EP in light of these findings and possibility of upgrading leads.  Echo    Interpretation Summary    Left Ventricle: The left ventricle is normal in size. Normal wall thickness. There is concentric remodeling. Regional wall motion abnormalities present. There is moderately reduced systolic function with a visually estimated ejection fraction of 35 - 40%. Ejection fraction by visual approximation is 35%. There is normal diastolic function.    Right Ventricle: Normal right ventricular cavity size. Wall thickness is normal. Right ventricle wall motion  is normal. Systolic function is normal. Pacemaker lead present in the ventricle.    Left Atrium: Left atrium is moderately dilated.    Right Atrium: Right atrium is mildly dilated. Lead present in the right atrium.    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 Aortic " valve area by VTI is 1.03 cm². Aortic valve peak velocity is 2.07 m/s. Mean gradient is 11 mmHg. The dimensionless index is 0.30. There is trace aortic regurgitation.    Mitral Valve: There is mild mitral annular calcification present.    Tricuspid Valve: There is mild regurgitation.    Pulmonary Artery: There is mild to moderate pulmonary hypertension. The estimated pulmonary artery systolic pressure is 47 mmHg.    IVC/SVC: Normal venous pressure at 3 mmHg.     Recommendations:   - Stopped Diltiazem in setting of newly found reduced EF  - Start patient on GDMT in light of new EF. Can switch losartan to Entresto, start Jardiance, MRA, and continue BB   - Device interrogation ordered   - Consulted EP for PPM evaluation and possibly upgrading leads   - Recommend PET STRESS while patient is hospitalized (hold on this)

## 2024-02-13 NOTE — ASSESSMENT & PLAN NOTE
- IP CHF Pathway initiated  - Diuresing with lasix 80mg iv BID  - TTE 2/24: Regional wall motion abnormalities present. There is moderately reduced systolic function with a visually estimated ejection fraction of 35 - 40%   - Patient noted to have reduced EF compared to 55-60% on TTE obtained on 9/23.  - Consulted cardiology. Stopped Diltiazem in setting of newly found reduced EF. Start patient on GDMT in light of new EF. Switched losartan to Entresto, start Jardiance, MRA, and continue BB   - Device interrogation ordered   - Consulted EP for PPM evaluation and possibly upgrading leads   - May need PET STRESS while inpatient

## 2024-02-13 NOTE — HPI
78F pmhx HLD, HTN, DM2, s/p TAVR (2019), CAD (pre TAVR 70% iLAD lesion), HFpEF, atrial fibrillation on Apixaban and carvedilol, PPM (placed at OSH for bradycardia in 2021, intermittently VVI paced), and COPD, who was seen in the outpatient clinic on 02/09/2024 and sent to the ED the same day due to SOB, increased bilateral LE swelling, orthopnea, wheezing and chest tightness for 1-5 minutes, even though she reports compliance with diuretics, diet and cardiac meds. Admitted with diagnosis of acute heart failure and COPD. Patient was started on furosemide as inpatient, New TTE showed EF 35-40% with regional wall motion abnormalities (akinesis of mid anteroseptal, apical septal, apical inferior and apex and hypokinesis of mid inferoseptal, mid inferior and apical anterior). Continued diuresis and started on GDMT.    Telemetry: Seems to be mostly ventricular pacing 68bpm, although     EP consulted for concern of pacing induced HF.

## 2024-02-13 NOTE — PLAN OF CARE
Problem: Adult Inpatient Plan of Care  Goal: Plan of Care Review  Outcome: Ongoing, Progressing  Goal: Patient-Specific Goal (Individualized)  Outcome: Ongoing, Progressing  Goal: Absence of Hospital-Acquired Illness or Injury  Outcome: Ongoing, Progressing  Goal: Optimal Comfort and Wellbeing  Outcome: Ongoing, Progressing  Goal: Readiness for Transition of Care  Outcome: Ongoing, Progressing     Problem: Diabetes Comorbidity  Goal: Blood Glucose Level Within Targeted Range  Outcome: Ongoing, Progressing     Problem: Fluid and Electrolyte Imbalance (Acute Kidney Injury/Impairment)  Goal: Fluid and Electrolyte Balance  Outcome: Ongoing, Progressing     Problem: Renal Function Impairment (Acute Kidney Injury/Impairment)  Goal: Effective Renal Function  Outcome: Ongoing, Progressing     Problem: Skin Injury Risk Increased  Goal: Skin Health and Integrity  Outcome: Ongoing, Progressing    Shift note: family stayed at bedside. Pt diuresing via pirewick. Pt displayed understanding of course of treatment. Pt ambulated in room. Bowel meds provided with absence of BM. Vital signs stable. Safety measures in place. No pain.

## 2024-02-13 NOTE — ASSESSMENT & PLAN NOTE
78F pmhx HLD, HTN, DM2, s/p TAVR (2019), CAD (pre TAVR 70% iLAD lesion), HFpEF, atrial fibrillation on Apixaban and carvedilol, PPM (placed at OSH for bradycardia in 2021, intermittently VVI paced), and COPD, who was admitted to [facility] on 02/09/2024 with diagnosis of acute heart failure and COPD. Patient was started on furosemide as inpatient, and new TTE showed EF 35-40% with regional wall motion abnormalities (akinesis of mid anteroseptal, apical septal, apical inferior and apex and hypokinesis of mid inferoseptal, mid inferior and apical anterior). Continued diuresis and started on GDMT.   - Telemetry: Seems to be mostly ventricular pacing 68bpm  - EP consulted for concern of pacing induced HF.

## 2024-02-13 NOTE — SUBJECTIVE & OBJECTIVE
Interval History: NAEON, diuresing well. Weaned down to 2L supplemental oxygen via NC which is also home oxygen requirement. Cardiology rec starting GDMT. EP consulted for RV pacing.        Review of Systems   Constitutional:  Positive for fatigue. Negative for appetite change.   Respiratory:  Positive for shortness of breath. Negative for cough.    Cardiovascular:  Positive for leg swelling. Negative for chest pain.   Gastrointestinal:  Negative for abdominal distention, abdominal pain, constipation and diarrhea.   Genitourinary:  Negative for difficulty urinating and dysuria.   Neurological:  Negative for dizziness and headaches.     Objective:     Vital Signs (Most Recent):  Temp: 97.4 °F (36.3 °C) (02/13/24 0725)  Pulse: 60 (02/13/24 1106)  Resp: 18 (02/13/24 0939)  BP: (!) 120/58 (02/13/24 0725)  SpO2: 96 % (02/13/24 0939) Vital Signs (24h Range):  Temp:  [97.4 °F (36.3 °C)-98.4 °F (36.9 °C)] 97.4 °F (36.3 °C)  Pulse:  [60-71] 60  Resp:  [16-18] 18  SpO2:  [96 %-99 %] 96 %  BP: (120-150)/(58-67) 120/58     Weight: 102.5 kg (226 lb)  Body mass index is 38.79 kg/m².    Intake/Output Summary (Last 24 hours) at 2/13/2024 1249  Last data filed at 2/13/2024 0716  Gross per 24 hour   Intake 390 ml   Output 2325 ml   Net -1935 ml           Physical Exam  Constitutional:       Appearance: Normal appearance.   HENT:      Head: Normocephalic and atraumatic.      Mouth/Throat:      Mouth: Mucous membranes are moist.   Cardiovascular:      Rate and Rhythm: Normal rate and regular rhythm.      Pulses: Normal pulses.      Heart sounds: Normal heart sounds.   Pulmonary:      Effort: Pulmonary effort is normal.      Breath sounds: Rales present.   Abdominal:      General: Abdomen is flat. Bowel sounds are normal. There is no distension.      Palpations: Abdomen is soft.      Tenderness: There is no abdominal tenderness.   Musculoskeletal:         General: Normal range of motion.      Cervical back: Normal range of motion and  neck supple.      Right lower leg: Edema present.      Left lower leg: Edema present.   Skin:     General: Skin is warm and dry.   Neurological:      General: No focal deficit present.      Mental Status: She is alert and oriented to person, place, and time.   Psychiatric:         Mood and Affect: Mood normal.             Significant Labs: All pertinent labs within the past 24 hours have been reviewed.    Significant Imaging: I have reviewed all pertinent imaging results/findings within the past 24 hours.

## 2024-02-13 NOTE — SUBJECTIVE & OBJECTIVE
Past Medical History:   Diagnosis Date    Acute on chronic diastolic (congestive) heart failure 11/20/2019    Anticoagulant long-term use     on Plavix since May 2015    Anxiety     Arthritis     Asthma     Atrial fibrillation     Atrial fibrillation with RVR 10/19/2020    Cataracts, bilateral     Chest pain, atypical     Chronic atrial fibrillation with rapid ventricular response 6/23/2017    Colon polyp     Coronary artery disease     Diabetes mellitus     Dry eyes     Esophageal erosions     GERD (gastroesophageal reflux disease)     Heart murmur     Hemorrhoid     High cholesterol     Hypertension     Irritable bowel syndrome     Paroxysmal atrial fibrillation 10/30/2020    Persistent atrial fibrillation 2/10/2020    Shingles 2015    Stenosis of aortic and mitral valves     Stroke     TIA (transient ischemic attack)     Use of cane as ambulatory aid        Past Surgical History:   Procedure Laterality Date    ABDOMINAL SURGERY      stents to SMA    angiocele      2007, 2014    AORTIC VALVE REPLACEMENT N/A 11/19/2019    Procedure: Replacement-valve-aortic;  Surgeon: Jack Capone MD;  Location: [facility] CATH LAB;  Service: Cardiology;  Laterality: N/A;    BREAST SURGERY      left--- a lump--- no cancer    CARDIAC VALVE SURGERY      COLONOSCOPY  2014    COLONOSCOPY N/A 3/14/2018    Procedure: COLONOSCOPY;  Surgeon: Efren Kemp MD;  Location: [facility] ENDO ([address] [place] [address]);  Service: Endoscopy;  Laterality: N/A;  ok to hold Plavix 5 days prior to procedure per Dr RESHMA Hernandez     ok per Dr Kemp to hold Eliquis 2 days prior to procedure (see telephone encounter dated-2/7/18)    HERNIA REPAIR      HYSTERECTOMY  partial    1982 partial hysterectomy    LEFT HEART CATHETERIZATION Right 11/5/2019    Procedure: Left heart cath;  Surgeon: Jack Capone MD;  Location: [facility] CATH LAB;  Service: Cardiology;  Laterality: Right;    left nasal polyp      left neck lymph node      nose polyp      right hip fatty mass tissue      stent to small  intestine      SUPERIOR VENA CAVA ANGIOPLASTY / STENTING      TONSILLECTOMY      TOTAL ABDOMINAL HYSTERECTOMY      2014    TOTAL KNEE ARTHROPLASTY Bilateral     TREATMENT OF CARDIAC ARRHYTHMIA N/A 2/10/2020    Procedure: CARDIOVERSION;  Surgeon: Jayy Parrish MD;  Location: [facility] EP LAB;  Service: Cardiology;  Laterality: N/A;  AF, PEDRO, DCCV, MAC, DM, 3 Prep    TREATMENT OF CARDIAC ARRHYTHMIA N/A 5/15/2020    Procedure: CARDIOVERSION;  Surgeon: Hany Bee MD;  Location: [facility] EP LAB;  Service: Cardiology;  Laterality: N/A;  AF, PEDRO, DCCV, MAC, DM, 3 Prep    UPPER GASTROINTESTINAL ENDOSCOPY  2014       Review of patient's allergies indicates:   Allergen Reactions    Celebrex [celecoxib] Shortness Of Breath    Ciprofloxacin Swelling     lip    Dicyclomine Hives    Adhesive Dermatitis    Avelox [moxifloxacin] Swelling    Cilostazol Other (See Comments)     Elevates blood pressure    Eryc [erythromycin] Other (See Comments)     unknown    Iodine and iodide containing products Hives    Keflex [cephalexin] Hives    Meclomen      rashes    Meloxicam      Ear ringing    Metoclopramide Other (See Comments)     High blood pressure    Sulfa (sulfonamide antibiotics) Itching       Current Facility-Administered Medications on File Prior to Encounter   Medication    0.9%  NaCl infusion    sodium chloride 0.9% flush 5 mL     Current Outpatient Medications on File Prior to Encounter   Medication Sig    acetaminophen (TYLENOL) 650 MG TbSR Take 1,300 mg by mouth 2 (two) times daily as needed.    albuterol-ipratropium (DUO-NEB) 2.5 mg-0.5 mg/3 mL nebulizer solution Take 3 mLs by nebulization every 4 (four) hours as needed for Wheezing or Shortness of Breath.    apixaban (ELIQUIS) 5 mg Tab TAKE 1 TABLET BY MOUTH TWICE A DAY    ascorbic acid, vitamin C, (VITAMIN C) 500 MG tablet Take 1,000 mg by mouth once daily.     atorvastatin (LIPITOR) 10 MG tablet Take 1 tablet (10 mg total) by mouth once daily.    calcium carbonate/vitamin  D3 (CALTRATE 600 + D ORAL) Take 1 tablet by mouth once daily.    carvediloL (COREG) 12.5 MG tablet Take 1 tablet (12.5 mg total) by mouth 2 (two) times daily.    clopidogreL (PLAVIX) 75 mg tablet TAKE 1 TABLET BY MOUTH EVERY DAY    diltiaZEM (CARDIZEM CD) 240 MG 24 hr capsule TAKE 1 CAPSULE BY MOUTH ONCE A DAY    DULoxetine (CYMBALTA) 20 MG capsule Take 1 capsule (20 mg total) by mouth once daily.    empagliflozin (JARDIANCE) 10 mg tablet Take 1 tablet (10 mg total) by mouth once daily.    fexofenadine (ALLEGRA) 60 MG tablet Take 60 mg by mouth once daily.    fish oil-omega-3 fatty acids 300-1,000 mg capsule Take 1 g by mouth once daily.     furosemide (LASIX) 40 MG tablet Take 40mg (1 tablet) in the morning and 20mg (1/2 tablet) in the afternoon    gabapentin (NEURONTIN) 600 MG tablet Take 600 mg by mouth 3 (three) times daily.    isosorbide mononitrate (IMDUR) 60 MG 24 hr tablet Take 1 tablet (60 mg total) by mouth once daily.    LORazepam (ATIVAN) 0.5 MG tablet Take 1 tablet (0.5 mg total) by mouth every morning.    losartan (COZAAR) 50 MG Tab Take 1 tablet (50 mg total) by mouth once daily.    metFORMIN (GLUCOPHAGE) 500 MG tablet Take 1 tablet (500 mg total) by mouth 2 (two) times daily.    montelukast (SINGULAIR) 10 mg tablet Take 10 mg by mouth once daily.    mv-mn/folic ac/calcium/vit K1 (WOMEN'S 50 PLUS MULTIVITAMIN ORAL) Take 1 tablet by mouth once daily.    nitroGLYCERIN (NITROSTAT) 0.4 MG SL tablet Place 1 tablet (0.4 mg total) under the tongue every 5 (five) minutes as needed for Chest pain.    pantoprazole (PROTONIX) 40 MG tablet Take 1 tablet (40 mg total) by mouth once daily.    potassium gluconate 550 mg (90 mg) Tab Take 180 mg by mouth 2 (two) times a day.    ranolazine (RANEXA) 1,000 mg Tb12 Take 1 tablet (1,000 mg total) by mouth 2 (two) times daily.    SYMBICORT 160-4.5 mcg/actuation HFAA Inhale 1 puff into the lungs 2 (two) times daily.     Family History       Problem Relation (Age of Onset)     Heart attack Mother    Heart failure Brother    Stroke Father          Tobacco Use    Smoking status: Never    Smokeless tobacco: Never   Substance and Sexual Activity    Alcohol use: No    Drug use: No    Sexual activity: Not Currently     Review of Systems   Constitutional: Negative.   HENT: Negative.     Cardiovascular:  Positive for dyspnea on exertion, leg swelling, orthopnea and paroxysmal nocturnal dyspnea. Negative for chest pain, claudication, cyanosis, irregular heartbeat, near-syncope, palpitations and syncope.   Respiratory: Negative.     Endocrine: Negative.    Musculoskeletal: Negative.    Gastrointestinal: Negative.    Genitourinary: Negative.    Neurological: Negative.      Objective:     Vital Signs (Most Recent):  Temp: 98.2 °F (36.8 °C) (02/13/24 0344)  Pulse: 69 (02/13/24 0344)  Resp: 18 (02/13/24 0344)  BP: 133/63 (02/13/24 0344)  SpO2: 96 % (02/13/24 0344) Vital Signs (24h Range):  Temp:  [97.9 °F (36.6 °C)-98.4 °F (36.9 °C)] 98.2 °F (36.8 °C)  Pulse:  [66-71] 69  Resp:  [16-18] 18  SpO2:  [96 %-99 %] 96 %  BP: (126-165)/(60-72) 133/63       Weight: 102.5 kg (226 lb)  Body mass index is 38.79 kg/m².    SpO2: 96 %        Telemetry: Seems to be mostly ventricular pacing 68bpm    Physical Exam  Vitals and nursing note reviewed.   Constitutional:       Appearance: Normal appearance.   HENT:      Head: Normocephalic and atraumatic.   Eyes:      Extraocular Movements: Extraocular movements intact.      Pupils: Pupils are equal, round, and reactive to light.   Neck:      Vascular: No carotid bruit.   Cardiovascular:      Rate and Rhythm: Normal rate and regular rhythm.      Pulses: Normal pulses.      Heart sounds: Normal heart sounds. No murmur heard.     No friction rub. No gallop.   Pulmonary:      Effort: Pulmonary effort is normal.      Breath sounds: Normal breath sounds.   Abdominal:      General: Abdomen is flat. Bowel sounds are normal. There is no distension.      Palpations: Abdomen is  soft. There is no mass.      Tenderness: There is no abdominal tenderness.   Musculoskeletal:         General: No swelling. Normal range of motion.      Cervical back: Normal range of motion.   Skin:     General: Skin is warm.      Capillary Refill: Capillary refill takes less than 2 seconds.   Neurological:      General: No focal deficit present.      Mental Status: She is alert and oriented to person, place, and time.            Significant Labs:     Recent Labs   Lab 02/09/24  1708 02/10/24  0502 02/13/24  0452   WBC 7.02 6.99 6.77   HGB 12.8 12.2 12.0   HCT 40.3 38.4 37.2    222 216       Recent Labs   Lab 02/11/24  0554 02/12/24  0553 02/13/24  0452    134* 134*   K 3.6 3.5 3.5   CL 98 94* 93*   CO2 33* 30* 34*   BUN 18 17 18   CREATININE 0.8 0.7 0.7   CALCIUM 9.0 9.0 8.9       Recent Labs   Lab 02/09/24  1708   ALKPHOS 44*   BILITOT 0.3   PROT 7.4   ALT 14   AST 31     Recent Labs   Lab 02/09/24  1708   TROPONINI <0.006     Significant Imaging:     EKG 02/09/2024  - Afib, Ventricular paced rhythm 69bpm    EKG 10/27/2023  - Afib, Ventricular paced rhythm 71bpm    EKG 09/21/  - Afib, Ventricular paced rhythm 69bpm      TTE 02/10/2024    Left Ventricle: The left ventricle is normal in size. Normal wall thickness. There is concentric remodeling. Regional wall motion abnormalities present. There is moderately reduced systolic function with a visually estimated ejection fraction of 35 - 40%. Ejection fraction by visual approximation is 35%. There is normal diastolic function.    Right Ventricle: Normal right ventricular cavity size. Wall thickness is normal. Right ventricle wall motion  is normal. Systolic function is normal. Pacemaker lead present in the ventricle.    Left Atrium: Left atrium is moderately dilated.    Right Atrium: Right atrium is mildly dilated. Lead present in the right atrium.    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 Aortic valve area by  VTI is 1.03 cm². Aortic valve peak velocity is 2.07 m/s. Mean gradient is 11 mmHg. The dimensionless index is 0.30. There is trace aortic regurgitation.    Mitral Valve: There is mild mitral annular calcification present.    Tricuspid Valve: There is mild regurgitation.    Pulmonary Artery: There is mild to moderate pulmonary hypertension. The estimated pulmonary artery systolic pressure is 47 mmHg.    IVC/SVC: Normal venous pressure at 3 mmHg.    PET stress 10/27/23    There is a very small (< 5%) sized, mild intensity mid septal and anteroseptal reversible perfusion abnormality involving 3% of LV myocardium in the distribution of the LAD.    Within the perfusion abnormality, absolute myocardial perfusion (cc/min/gm) averaged 0.44 cc/min/g at rest, 0.47 cc/min/g at stress and CFR was 1.09 , which equates to severely reduced coronary flow capacity within the LAD territory.    There are no other significant perfusion abnormalities.    The whole heart absolute myocardial perfusion values averaged 0.59 cc/min/g at rest, which is reduced; 0.84 cc/min/g at stress, which is moderately reduced; and CFR is 1.43 , which is moderately reduced.    The stress flow is reduced diffusely throughout the heart with regions that are on the borderline of ischemic thresholds.    CT attenuation images demonstrate moderate diffuse coronary calcifications in the LAD, LCX, RCA and LM territory and moderate diffuse aortic calcifications in the ascending aorta, descending aorta and aortic arch.    The gated perfusion images showed an ejection fraction of 67% at rest and 67% during stress. A normal ejection fraction is greater than 47%.    The wall motion is normal at rest and during stress.    The LV cavity size is normal at rest and stress.    The ECG portion of the study is uninterpretable due to ventricular paced rhythm.    There were no arrhythmias during stress.    The patient reported chest pain during the stress test.    When compared  to the previous study from 9/14/2022, there are significant changes.  There is now a stress induced perfusion abnormality, as described above.     TTE 09/22/23    Left Ventricle: The left ventricle is normal in size. Increased wall thickness. Septal motion is consistent with bundle branch block. There is normal systolic function with a visually estimated ejection fraction of 55 - 60%. Grade III diastolic dysfunction.    Right Ventricle: Normal right ventricular cavity size. Wall thickness is normal. Right ventricle wall motion  is normal. Systolic function is normal. Pacemaker lead present in the ventricle.    Left Atrium: Left atrium is severely dilated.    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 It is reported to be a 26 mm Suzi S3 and Phan valve. Aortic valve area by VTI is 1.40 cm². Aortic valve peak velocity is 2.47 m/s. Mean gradient is 13 mmHg. The dimensionless index is 0.38. There is trace aortic regurgitation.    Pulmonary Artery: The estimated pulmonary artery systolic pressure is 44 mmHg.    IVC/SVC: Intermediate venous pressure at 8 mmHg.

## 2024-02-13 NOTE — PROGRESS NOTES
02/13/24 1542   Vital Signs   Pulse 60   Heart Rate Source Monitor   SpO2 98 %   Flow (L/min) 2   Device (Oxygen Therapy) nasal cannula   /65   MAP (mmHg) 93   BP Location Right arm   BP Method Automatic     Complains of 8/10 left chest pressure. Dr. Kumar notified. Stat troponin and EKG obtained. Given 3 mg of IVP morphine. Will continue to monitor.

## 2024-02-13 NOTE — PROGRESS NOTES
[facility] ([place] [id number])  [facility] Medicine  Progress Note    Patient Name: Adriana Strong  MRN: [id number]  Patient Class: IP- Inpatient   Admission Date: 2/9/2024  Length of Stay: 2 days  Attending Physician: Guillermina Kumar MD  Primary Care Provider: Krzysztof Mcgraw MD        Subjective:     Principal Problem:<principal problem not specified>        HPI:  79F pmhx HLD, HTN, DM2, s/p TAVR (2019), CAD (pre TAVR 70% iLAD lesion), HFpEF, atrial fibrillation, PPM, COPD previously on home 2L home O2 but seems to not recently been using who presents to the ED from Cardiology clinic for concern of CHF exacerbation and worsening WHITLEY in setting of COPD. Patient reports she cannot walk across the room without getting SOB. Reports increasing bilateral leg swelling. Needs several pillows and 45 degree incline to sleep. Wheezing and using duonebs prn a couple times a week. Also reports chest tightness that occurs 1-5 minutes at a time, at random, unassociated with exercise nor eating, denies heartburn. Denies palpitations, lightheadedness. Patient reports compliance with diuretics and cardiac meds (lasix 40mg am, 20mg pm). Does not add salt to diet nor eats fried foods.    In the ED patient afebrile and hemodynamically stable saturating well on 2L NC.  and significant bilateral pitting edema. CXR without overt acute process. Patient with bilateral rales and slight wheezing. Started on lasix iv and given xopenex x1. Admitted to the care of medicine for further evaluation and management.     Overview/Hospital Course:  Pt sent to ED from cardio to be admitted for acute on chronic HF and exercise intolerance , started on iv lasix and O2 per protocol. Echo 2/10: Regional wall motion abnormalities present. There is moderately reduced systolic function with a visually estimated ejection fraction of 35 - 40% . US is neg for SVT     Interval History: NAEON, diuresing well. Weaned down to 2L supplemental oxygen  via NC which is also home oxygen requirement. Cardiology rec starting GDMT. EP consulted. PET stress test ordered. NPO MN. No caffeine, tea, chocolate until stress test obtained.     Review of Systems   Constitutional:  Positive for fatigue. Negative for appetite change.   Respiratory:  Positive for shortness of breath. Negative for cough.    Cardiovascular:  Positive for leg swelling. Negative for chest pain.   Gastrointestinal:  Negative for abdominal distention, abdominal pain, constipation and diarrhea.   Genitourinary:  Negative for difficulty urinating and dysuria.   Neurological:  Negative for dizziness and headaches.     Objective:     Vital Signs (Most Recent):  Temp: 97.4 °F (36.3 °C) (02/13/24 0725)  Pulse: 60 (02/13/24 1106)  Resp: 18 (02/13/24 0939)  BP: (!) 120/58 (02/13/24 0725)  SpO2: 96 % (02/13/24 0939) Vital Signs (24h Range):  Temp:  [97.4 °F (36.3 °C)-98.4 °F (36.9 °C)] 97.4 °F (36.3 °C)  Pulse:  [60-71] 60  Resp:  [16-18] 18  SpO2:  [96 %-99 %] 96 %  BP: (120-150)/(58-67) 120/58     Weight: 102.5 kg (226 lb)  Body mass index is 38.79 kg/m².    Intake/Output Summary (Last 24 hours) at 2/13/2024 1249  Last data filed at 2/13/2024 0716  Gross per 24 hour   Intake 390 ml   Output 2325 ml   Net -1935 ml           Physical Exam  Constitutional:       Appearance: Normal appearance.   HENT:      Head: Normocephalic and atraumatic.      Mouth/Throat:      Mouth: Mucous membranes are moist.   Cardiovascular:      Rate and Rhythm: Normal rate and regular rhythm.      Pulses: Normal pulses.      Heart sounds: Normal heart sounds.   Pulmonary:      Effort: Pulmonary effort is normal.      Breath sounds: Rales present.   Abdominal:      General: Abdomen is flat. Bowel sounds are normal. There is no distension.      Palpations: Abdomen is soft.      Tenderness: There is no abdominal tenderness.   Musculoskeletal:         General: Normal range of motion.      Cervical back: Normal range of motion and neck  supple.      Right lower leg: Edema present.      Left lower leg: Edema present.   Skin:     General: Skin is warm and dry.   Neurological:      General: No focal deficit present.      Mental Status: She is alert and oriented to person, place, and time.   Psychiatric:         Mood and Affect: Mood normal.             Significant Labs: All pertinent labs within the past 24 hours have been reviewed.    Significant Imaging: I have reviewed all pertinent imaging results/findings within the past 24 hours.    Assessment/Plan:      Acute on chronic systolic heart failure  - IP CHF Pathway initiated  - Diuresing with lasix 80mg iv BID  - TTE 2/24: Regional wall motion abnormalities present. There is moderately reduced systolic function with a visually estimated ejection fraction of 35 - 40%   - Patient noted to have reduced EF compared to 55-60% on TTE obtained on 9/23.  - Consulted cardiology. Stopped Diltiazem in setting of newly found reduced EF. Start patient on GDMT in light of new EF. Switched losartan to Entresto, start Jardiance, MRA, and continue BB   - Device interrogation ordered   - Consulted EP for PPM evaluation and possibly upgrading leads   - May need PET STRESS while inpatient     Pacemaker  EP consulted for concern for pacing induced HF. Recommend   - Stop beta blockers and avoid AV izabella blockers  - EP Changed mode of PPM from DDD-CLS 70bpm to VVI 60bpm  - No more interventions from EP standpoint  - Follow up as outpatient with Dr. Moeller in 3-4 weeks.      S/P TAVR (transcatheter aortic valve replacement)  - continue home meds      COPD (chronic obstructive pulmonary disease)  - current symptoms felt more secondary to CHF over COPD exacerbation. Though potentially progressive chronic COPD changes without acute exacerbation may also be etiology  - previously on home 2L O2 after 6min walk test  - currently saturating well on 4L O2  - xopenex x1 in ED  - Duonebs q4h prn  - continue home LABA-ICS and  symbicort    Persistent atrial fibrillation  - continue home Eliquis  - continue home coreg and diltiazem    Essential hypertension  - continue home meds coreg, losartan, diltiazem    Type 2 diabetes mellitus  - SSI  - hypoglycemic protocol      VTE Risk Mitigation (From admission, onward)           Ordered     apixaban tablet 5 mg  2 times daily         02/09/24 2110                    Discharge Planning   MIMI: 2/14/2024     Code Status: Full Code   Is the patient medically ready for discharge?:     Reason for patient still in hospital (select all that apply): Patient trending condition  Discharge Plan A: Home with family                  Guillermina Kumar MD  Department of Hospital Medicine   [facility] ([place])

## 2024-02-13 NOTE — ASSESSMENT & PLAN NOTE
EP consulted for concern for pacing induced HF. Recommend   - Stop beta blockers and avoid AV izabella blockers  - EP Changed mode of PPM from DDD-CLS 70bpm to VVI 60bpm  - No more interventions from EP standpoint  - Follow up as outpatient with Dr. Moeller in 3-4 weeks.

## 2024-02-13 NOTE — CONSULTS
[facility] ([place] [address])  Cardiology  Consult Note    Patient Name: Adriana Strong  MRN: [id number]  Admission Date: 2/9/2024  Hospital Length of Stay: 2 days  Code Status: Full Code   Attending Provider: Guillermina Kumar MD   Consulting Provider: Vinh Monroy DO  Primary Care Physician: Krzysztof Mcgraw MD  Principal Problem:<principal problem not specified>    Patient information was obtained from patient, spouse/SO, and ER records.     Inpatient consult to Cardiology  Consult performed by: Vinh Monroy DO  Consult ordered by: Guillermina Kumar MD        Subjective:     Chief Complaint:  SOB      HPI:   Ms. Strong is a 79 year old female with a PMH of coronary artery disease, hyperlipidemia, hypertension, aortic stenosis status post TAVR (2019), diabetes, has CVA, HFpEF, atrial fibrillation, and a permanent pacemaker who presents as a direct admit from cardiology clinic due to SOB concerning for CHF exacerbation and suspected COPD exacerbation. She is presenting with her daughter who is present at bedside. Patient reports that she has increasing SOB on exertion in the past month or so. She reports that she takes 20 steps then feels like she needs to take a break. Patient reports that she also has chest pain at rest. Patient reports that she did not try to take her nitroglycerin because the pain comes and goes and its hard to pin point. Patient's  manages most of her medication. She reports that she has been compliant and has not missed any doses.    Cardiology is being consulted in setting of evaluation for newly reduced EF 35% from 60 % back in September of 2023. Patient denies any new medication use. She denies alcohol and illicit substance use. Patient denies abdominal pain, fever, chills, n/v/d, syncope, and blurry vision.         Interval History:   Patient reports an episode of chest tightness this morning which resolved. Patient reports that her SOB has improved, noting when she went to  the restroom to urinate she was not out of breath. Consulted EP in setting of PPM evaluation due to newly reduced EF found. Patient currently on 3 L of oxygen, bl home requirement is 2 L.     Review of Systems   Constitutional: Negative for chills, fever and malaise/fatigue.   HENT:  Negative for sore throat.    Eyes:  Negative for blurred vision and visual disturbance.   Cardiovascular:  Positive for chest pain, dyspnea on exertion, leg swelling, orthopnea and palpitations. Negative for syncope.   Respiratory:  Positive for cough and shortness of breath. Negative for hemoptysis and wheezing.    Gastrointestinal:  Negative for abdominal pain, constipation, diarrhea, nausea and vomiting.   Genitourinary:  Negative for dysuria and hematuria.   Psychiatric/Behavioral:  Negative for altered mental status, depression and hallucinations.      Objective:     Vital Signs (Most Recent):  Temp: 97.4 °F (36.3 °C) (02/13/24 0725)  Pulse: 69 (02/13/24 0725)  Resp: 18 (02/13/24 0725)  BP: (!) 120/58 (02/13/24 0725)  SpO2: 98 % (02/13/24 0725) Vital Signs (24h Range):  Temp:  [97.4 °F (36.3 °C)-98.4 °F (36.9 °C)] 97.4 °F (36.3 °C)  Pulse:  [69-71] 69  Resp:  [16-18] 18  SpO2:  [96 %-99 %] 98 %  BP: (120-150)/(58-67) 120/58     Weight: 102.5 kg (226 lb)  Body mass index is 38.79 kg/m².     SpO2: 98 %         Intake/Output Summary (Last 24 hours) at 2/13/2024 0932  Last data filed at 2/13/2024 0716  Gross per 24 hour   Intake 390 ml   Output 2325 ml   Net -1935 ml         Lines/Drains/Airways       Drain  Duration             Female External Urinary Catheter w/ Suction 02/09/24 2024 3 days              Peripheral Intravenous Line  Duration                  Peripheral IV - Single Lumen 02/09/24 1706 22 G Posterior;Right Hand 3 days                       Physical Exam  Constitutional:       Appearance: Normal appearance. She is obese.   HENT:      Head: Normocephalic and atraumatic.   Eyes:      Extraocular Movements: Extraocular  "movements intact.   Cardiovascular:      Rate and Rhythm: Normal rate and regular rhythm.      Pulses: Normal pulses.      Heart sounds: Normal heart sounds.   Pulmonary:      Breath sounds: Wheezing present.      Comments: Crackles auscultated b/t  Abdominal:      General: There is no distension.      Palpations: Abdomen is soft.      Tenderness: There is no abdominal tenderness.   Musculoskeletal:         General: Swelling present.   Skin:     General: Skin is warm.      Capillary Refill: Capillary refill takes less than 2 seconds.   Neurological:      General: No focal deficit present.      Mental Status: She is alert and oriented to person, place, and time.            Significant Labs: All pertinent lab results from the last 24 hours have been reviewed.      Assessment and Plan:     Acute on chronic systolic heart failure  Patient is presenting from cardiology clinic for suspected CHF exacerbation and underlying COPD exacerbation as well. Patient reports that her symptoms began 1 month ago. She reports no inciting factor and reports progressive onset. Patient has WHITLEY and a dry cough. Patient reports that she has been adherent to her medication regimen and has no problems with it.     Patient troponin's wnl, BNP is 210 near baseline. Reviewed EKG which reveals ventricular paced rhythm. Patient's last echo on 2/9/24 revealed newly reduced ejection fraction at 35%. Patient has history of permanent pacemaker placement in 2022. And she has a history of TAVR in 2019. PET stress in 2023 revealed "There is a very small (< 5%) sized, mild intensity mid septal and anteroseptal reversible perfusion abnormality involving 3% of LV myocardium in the distribution of the LAD"    Patient is RV paced. Upon review of EKG it appears she is BV paced. But the CXR does not reveal CS leads in light of these findings. We suspect there to be a pacemaker induced heart failure component and would like to involve EP in light of these " findings and possibility of upgrading leads.  Echo    Interpretation Summary    Left Ventricle: The left ventricle is normal in size. Normal wall thickness. There is concentric remodeling. Regional wall motion abnormalities present. There is moderately reduced systolic function with a visually estimated ejection fraction of 35 - 40%. Ejection fraction by visual approximation is 35%. There is normal diastolic function.    Right Ventricle: Normal right ventricular cavity size. Wall thickness is normal. Right ventricle wall motion  is normal. Systolic function is normal. Pacemaker lead present in the ventricle.    Left Atrium: Left atrium is moderately dilated.    Right Atrium: Right atrium is mildly dilated. Lead present in the right atrium.    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 Aortic valve area by VTI is 1.03 cm². Aortic valve peak velocity is 2.07 m/s. Mean gradient is 11 mmHg. The dimensionless index is 0.30. There is trace aortic regurgitation.    Mitral Valve: There is mild mitral annular calcification present.    Tricuspid Valve: There is mild regurgitation.    Pulmonary Artery: There is mild to moderate pulmonary hypertension. The estimated pulmonary artery systolic pressure is 47 mmHg.    IVC/SVC: Normal venous pressure at 3 mmHg.     Recommendations:   - Stopped Diltiazem in setting of newly found reduced EF  - Start patient on GDMT in light of new EF. Can switch losartan to Entresto, start Jardiance, MRA, and continue BB   - Device interrogation ordered   - Consulted EP for PPM evaluation and possibly upgrading leads   - Recommend PET STRESS while patient is hospitalized (hold on this)          VTE Risk Mitigation (From admission, onward)           Ordered     apixaban tablet 5 mg  2 times daily         02/09/24 3734                    Thank you for your consult. I will follow-up with patient. Please contact us if you have any additional questions.    Vinh  Eliana, DO  Cardiology   [facility] ([place])  I have personally taken the history and examined the patient and agree with the resident's note as stated above.Appreciate EP help with pacer . Order PET Stress unless patient decides that she does not want to consider cath intervention-if she has large PET defect, she and cath lab could consider stenting that would not be low risk based on anatomy ( defect in October only 3%)

## 2024-02-14 LAB
ANION GAP SERPL CALC-SCNC: 9 MMOL/L (ref 8–16)
BUN SERPL-MCNC: 18 MG/DL (ref 8–23)
CALCIUM SERPL-MCNC: 9 MG/DL (ref 8.7–10.5)
CFR FLOW - ANTERIOR: 1.23
CFR FLOW - INFERIOR: 1.2
CFR FLOW - LATERAL: 1.18
CFR FLOW - MAX: 1.43
CFR FLOW - MIN: 0.77
CFR FLOW - SEPTAL: 1.02
CFR FLOW - WHOLE HEART: 1.16
CHLORIDE SERPL-SCNC: 91 MMOL/L (ref 95–110)
CO2 SERPL-SCNC: 33 MMOL/L (ref 23–29)
CREAT SERPL-MCNC: 0.7 MG/DL (ref 0.5–1.4)
CV PHARM DOSE: 0.4 MG
CV STRESS BASE HR: 60 BPM
DIASTOLIC BLOOD PRESSURE: 70 MMHG
EJECTION FRACTION- HIGH: 59 %
END DIASTOLIC INDEX-HIGH: 155 ML/M2
END DIASTOLIC INDEX-LOW: 91 ML/M2
END SYSTOLIC INDEX-HIGH: 78 ML/M2
END SYSTOLIC INDEX-LOW: 40 ML/M2
EST. GFR  (NO RACE VARIABLE): >60 ML/MIN/1.73 M^2
GLUCOSE SERPL-MCNC: 107 MG/DL (ref 70–110)
NUC REST DIASTOLIC VOLUME INDEX: 71
NUC REST EJECTION FRACTION: 61
NUC REST SYSTOLIC VOLUME INDEX: 28
NUC STRESS DIASTOLIC VOLUME INDEX: 88
NUC STRESS EJECTION FRACTION: 65 %
NUC STRESS SYSTOLIC VOLUME INDEX: 31
OHS CV CPX 85 PERCENT MAX PREDICTED HEART RATE MALE: 118
OHS CV CPX MAX PREDICTED HEART RATE: 139
OHS CV CPX PATIENT IS FEMALE: 1
OHS CV CPX PATIENT IS MALE: 0
OHS CV CPX PEAK DIASTOLIC BLOOD PRESSURE: 47 MMHG
OHS CV CPX PEAK HEAR RATE: 60 BPM
OHS CV CPX PEAK RATE PRESSURE PRODUCT: 8160
OHS CV CPX PEAK SYSTOLIC BLOOD PRESSURE: 136 MMHG
OHS CV CPX PERCENT MAX PREDICTED HEART RATE ACHIEVED: 45
OHS CV CPX RATE PRESSURE PRODUCT PRESENTING: 8760
OHS QRS DURATION: 158 MS
OHS QTC CALCULATION: 488 MS
POCT GLUCOSE: 117 MG/DL (ref 70–110)
POCT GLUCOSE: 123 MG/DL (ref 70–110)
POCT GLUCOSE: 131 MG/DL (ref 70–110)
POCT GLUCOSE: 140 MG/DL (ref 70–110)
POTASSIUM SERPL-SCNC: 3.5 MMOL/L (ref 3.5–5.1)
REST FLOW - ANTERIOR: 0.9 CC/MIN/G
REST FLOW - INFERIOR: 0.81 CC/MIN/G
REST FLOW - LATERAL: 0.86 CC/MIN/G
REST FLOW - MAX: 1.18 CC/MIN/G
REST FLOW - MIN: 0.29 CC/MIN/G
REST FLOW - SEPTAL: 0.66 CC/MIN/G
REST FLOW - WHOLE HEART: 0.81 CC/MIN/G
RETIRED EF AND QEF - SEE NOTES: 47 %
SODIUM SERPL-SCNC: 133 MMOL/L (ref 136–145)
STRESS FLOW - ANTERIOR: 1.09 CC/MIN/G
STRESS FLOW - INFERIOR: 0.95 CC/MIN/G
STRESS FLOW - LATERAL: 1.01 CC/MIN/G
STRESS FLOW - MAX: 1.47 CC/MIN/G
STRESS FLOW - MIN: 0.24 CC/MIN/G
STRESS FLOW - SEPTAL: 0.68 CC/MIN/G
STRESS FLOW - WHOLE HEART: 0.93 CC/MIN/G
SYSTOLIC BLOOD PRESSURE: 146 MMHG

## 2024-02-14 PROCEDURE — 25000003 PHARM REV CODE 250: Performed by: HOSPITALIST

## 2024-02-14 PROCEDURE — 99233 SBSQ HOSP IP/OBS HIGH 50: CPT | Mod: ,,, | Performed by: INTERNAL MEDICINE

## 2024-02-14 PROCEDURE — 80048 BASIC METABOLIC PNL TOTAL CA: CPT | Performed by: FAMILY MEDICINE

## 2024-02-14 PROCEDURE — 63600175 PHARM REV CODE 636 W HCPCS: Performed by: FAMILY MEDICINE

## 2024-02-14 PROCEDURE — 63600175 PHARM REV CODE 636 W HCPCS: Performed by: INTERNAL MEDICINE

## 2024-02-14 PROCEDURE — 20600001 HC STEP DOWN PRIVATE ROOM

## 2024-02-14 PROCEDURE — 25000242 PHARM REV CODE 250 ALT 637 W/ HCPCS: Performed by: STUDENT IN AN ORGANIZED HEALTH CARE EDUCATION/TRAINING PROGRAM

## 2024-02-14 PROCEDURE — 25000003 PHARM REV CODE 250: Performed by: INTERNAL MEDICINE

## 2024-02-14 PROCEDURE — 25000003 PHARM REV CODE 250: Performed by: FAMILY MEDICINE

## 2024-02-14 PROCEDURE — 36415 COLL VENOUS BLD VENIPUNCTURE: CPT | Performed by: FAMILY MEDICINE

## 2024-02-14 PROCEDURE — 63600175 PHARM REV CODE 636 W HCPCS: Performed by: STUDENT IN AN ORGANIZED HEALTH CARE EDUCATION/TRAINING PROGRAM

## 2024-02-14 PROCEDURE — 25000003 PHARM REV CODE 250: Performed by: STUDENT IN AN ORGANIZED HEALTH CARE EDUCATION/TRAINING PROGRAM

## 2024-02-14 RX ORDER — REGADENOSON 0.08 MG/ML
0.4 INJECTION, SOLUTION INTRAVENOUS
Status: COMPLETED | OUTPATIENT
Start: 2024-02-14 | End: 2024-02-14

## 2024-02-14 RX ORDER — LOSARTAN POTASSIUM 50 MG/1
50 TABLET ORAL DAILY
Status: DISCONTINUED | OUTPATIENT
Start: 2024-02-15 | End: 2024-02-16 | Stop reason: HOSPADM

## 2024-02-14 RX ORDER — DIPHENHYDRAMINE HCL 25 MG
25 CAPSULE ORAL EVERY 6 HOURS
Status: DISCONTINUED | OUTPATIENT
Start: 2024-02-14 | End: 2024-02-14

## 2024-02-14 RX ORDER — AMINOPHYLLINE 25 MG/ML
75 INJECTION, SOLUTION INTRAVENOUS ONCE
Status: COMPLETED | OUTPATIENT
Start: 2024-02-14 | End: 2024-02-14

## 2024-02-14 RX ORDER — PREDNISONE 50 MG/1
50 TABLET ORAL EVERY 6 HOURS
Status: COMPLETED | OUTPATIENT
Start: 2024-02-14 | End: 2024-02-15

## 2024-02-14 RX ORDER — SODIUM CHLORIDE 0.9 % (FLUSH) 0.9 %
10 SYRINGE (ML) INJECTION
Status: DISCONTINUED | OUTPATIENT
Start: 2024-02-14 | End: 2024-02-16 | Stop reason: HOSPADM

## 2024-02-14 RX ORDER — FAMOTIDINE 20 MG/1
20 TABLET, FILM COATED ORAL EVERY 6 HOURS
Status: COMPLETED | OUTPATIENT
Start: 2024-02-14 | End: 2024-02-15

## 2024-02-14 RX ORDER — DIPHENHYDRAMINE HCL 50 MG
50 CAPSULE ORAL EVERY 6 HOURS
Status: COMPLETED | OUTPATIENT
Start: 2024-02-14 | End: 2024-02-15

## 2024-02-14 RX ADMIN — MONTELUKAST 10 MG: 10 TABLET, FILM COATED ORAL at 10:02

## 2024-02-14 RX ADMIN — ISOSORBIDE MONONITRATE 60 MG: 30 TABLET, EXTENDED RELEASE ORAL at 10:02

## 2024-02-14 RX ADMIN — ATORVASTATIN CALCIUM 10 MG: 10 TABLET, FILM COATED ORAL at 10:02

## 2024-02-14 RX ADMIN — PANTOPRAZOLE SODIUM 40 MG: 40 TABLET, DELAYED RELEASE ORAL at 10:02

## 2024-02-14 RX ADMIN — FUROSEMIDE 80 MG: 10 INJECTION, SOLUTION INTRAVENOUS at 10:02

## 2024-02-14 RX ADMIN — DULOXETINE HYDROCHLORIDE 20 MG: 20 CAPSULE, DELAYED RELEASE ORAL at 10:02

## 2024-02-14 RX ADMIN — GABAPENTIN 600 MG: 300 CAPSULE ORAL at 03:02

## 2024-02-14 RX ADMIN — PREDNISONE 50 MG: 50 TABLET ORAL at 06:02

## 2024-02-14 RX ADMIN — LORAZEPAM 0.5 MG: 0.5 TABLET ORAL at 10:02

## 2024-02-14 RX ADMIN — REGADENOSON 0.4 MG: 0.08 INJECTION, SOLUTION INTRAVENOUS at 08:02

## 2024-02-14 RX ADMIN — GABAPENTIN 600 MG: 300 CAPSULE ORAL at 10:02

## 2024-02-14 RX ADMIN — SACUBITRIL AND VALSARTAN 1 TABLET: 24; 26 TABLET, FILM COATED ORAL at 10:02

## 2024-02-14 RX ADMIN — AMINOPHYLLINE 75 MG: 25 INJECTION, SOLUTION INTRAVENOUS at 08:02

## 2024-02-14 RX ADMIN — SPIRONOLACTONE 25 MG: 25 TABLET, FILM COATED ORAL at 10:02

## 2024-02-14 RX ADMIN — EMPAGLIFLOZIN 10 MG: 10 TABLET, FILM COATED ORAL at 10:02

## 2024-02-14 RX ADMIN — POLYETHYLENE GLYCOL 3350 17 G: 17 POWDER, FOR SOLUTION ORAL at 10:02

## 2024-02-14 RX ADMIN — RANOLAZINE 1000 MG: 1000 TABLET, FILM COATED, EXTENDED RELEASE ORAL at 10:02

## 2024-02-14 RX ADMIN — DIPHENHYDRAMINE HYDROCHLORIDE 50 MG: 50 CAPSULE ORAL at 06:02

## 2024-02-14 RX ADMIN — APIXABAN 5 MG: 5 TABLET, FILM COATED ORAL at 10:02

## 2024-02-14 RX ADMIN — FLUTICASONE FUROATE AND VILANTEROL TRIFENATATE 1 PUFF: 100; 25 POWDER RESPIRATORY (INHALATION) at 09:02

## 2024-02-14 RX ADMIN — FAMOTIDINE 20 MG: 20 TABLET, FILM COATED ORAL at 06:02

## 2024-02-14 RX ADMIN — CARVEDILOL 12.5 MG: 12.5 TABLET, FILM COATED ORAL at 10:02

## 2024-02-14 RX ADMIN — CLOPIDOGREL BISULFATE 75 MG: 75 TABLET ORAL at 10:02

## 2024-02-14 RX ADMIN — SENNOSIDES 8.6 MG: 8.6 TABLET, FILM COATED ORAL at 10:02

## 2024-02-14 NOTE — ASSESSMENT & PLAN NOTE
"Patient is presenting from cardiology clinic for suspected CHF exacerbation and underlying COPD exacerbation as well. Patient reports that her symptoms began 1 month ago. She reports no inciting factor and reports progressive onset. Patient has WHITLEY and a dry cough. Patient reports that she has been adherent to her medication regimen and has no problems with it.     Patient troponin's wnl, BNP is 210 near baseline. Reviewed EKG which reveals ventricular paced rhythm. Patient's last echo on 2/9/24 revealed newly reduced ejection fraction at 35%. Patient has history of permanent pacemaker placement in 2022. And she has a history of TAVR in 2019. PET stress in 2023 revealed "There is a very small (< 5%) sized, mild intensity mid septal and anteroseptal reversible perfusion abnormality involving 3% of LV myocardium in the distribution of the LAD"    Patient is RV paced. Upon review of EKG it appears she is BV paced. But the CXR does not reveal CS leads in light of these findings. We suspect there to be a pacemaker induced heart failure component and would like to involve EP in light of these findings and possibility of upgrading leads. EP evaluated patient and switched her PPM settings to 60 VVI. Plan to re-evaluate patient in 3-4 weeks in clinic. They also recommended stopping AV izabella blocking agents in setting of pace maker induced HF. GDMT also unlikely to provide benefit in setting of pacemaker induced HF so ok to stop medications.   Echo    Interpretation Summary    Left Ventricle: The left ventricle is normal in size. Normal wall thickness. There is concentric remodeling. Regional wall motion abnormalities present. There is moderately reduced systolic function with a visually estimated ejection fraction of 35 - 40%. Ejection fraction by visual approximation is 35%. There is normal diastolic function.    Right Ventricle: Normal right ventricular cavity size. Wall thickness is normal. Right ventricle wall motion  is " "normal. Systolic function is normal. Pacemaker lead present in the ventricle.    Left Atrium: Left atrium is moderately dilated.    Right Atrium: Right atrium is mildly dilated. Lead present in the right atrium.    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 Aortic valve area by VTI is 1.03 cm². Aortic valve peak velocity is 2.07 m/s. Mean gradient is 11 mmHg. The dimensionless index is 0.30. There is trace aortic regurgitation.    Mitral Valve: There is mild mitral annular calcification present.    Tricuspid Valve: There is mild regurgitation.    Pulmonary Artery: There is mild to moderate pulmonary hypertension. The estimated pulmonary artery systolic pressure is 47 mmHg.    IVC/SVC: Normal venous pressure at 3 mmHg.       PET Stress revealed:    "The stress flow is severely reduced diffusely throughout the heart consistent with likely three vessel balanced ischemia. The LV cavity appears to dilate during stress. The gated perfusion images showed an ejection fraction of 61% at rest and 65% during stress. A normal ejection fraction is greater than 47%." Please refer to study for further interpretation.     Recommendations:   - Stopped Diltiazem in setting of newly found reduced EF  - In the setting of pacemaker induced HF, ok to stop GDMT as this will not provide any benefit as EP recommended  - DC patients BB as this can slow AV node conduction   - Follow up in EP clinic in 3-4 weeks to have evaluation/need for upgrading pacemaker to CRT-P    We will sign off, please reach out with any questions. Thank you for your consult     "

## 2024-02-14 NOTE — SUBJECTIVE & OBJECTIVE
Interval History:   Patient was evaluated by EP yesterday. They switched her device settings to 60 VVI and recommended to stop AV izabella blocking agents. They will reevaluate in 4 weeks for possible upgrading to CRT-P in setting of pacemaker induced HF. Patient reports no increasing SOB or CP this AM. She had PET stress done as well.     Review of Systems   Constitutional: Negative for chills, fever and malaise/fatigue.   HENT:  Negative for sore throat.    Eyes:  Negative for blurred vision and visual disturbance.   Cardiovascular:  Positive for chest pain, dyspnea on exertion, leg swelling, orthopnea and palpitations. Negative for syncope.   Respiratory:  Positive for cough and shortness of breath. Negative for hemoptysis and wheezing.    Gastrointestinal:  Negative for abdominal pain, constipation, diarrhea, nausea and vomiting.   Genitourinary:  Negative for dysuria and hematuria.   Psychiatric/Behavioral:  Negative for altered mental status, depression and hallucinations.      Objective:     Vital Signs (Most Recent):  Temp: 98.1 °F (36.7 °C) (02/14/24 0744)  Pulse: 60 (02/14/24 1059)  Resp: 18 (02/14/24 0925)  BP: (!) 146/70 (02/14/24 0820)  SpO2: 98 % (02/14/24 0925) Vital Signs (24h Range):  Temp:  [96.7 °F (35.9 °C)-98.2 °F (36.8 °C)] 98.1 °F (36.7 °C)  Pulse:  [56-62] 60  Resp:  [14-20] 18  SpO2:  [94 %-98 %] 98 %  BP: (118-158)/(59-70) 146/70     Weight: 102.5 kg (226 lb)  Body mass index is 38.79 kg/m².     SpO2: 98 %         Intake/Output Summary (Last 24 hours) at 2/14/2024 1124  Last data filed at 2/14/2024 0515  Gross per 24 hour   Intake 1050 ml   Output 600 ml   Net 450 ml         Lines/Drains/Airways       Drain  Duration             Female External Urinary Catheter w/ Suction 02/09/24 2024 4 days              Peripheral Intravenous Line  Duration                  Peripheral IV - Single Lumen 02/09/24 1706 22 G Posterior;Right Hand 4 days                       Physical Exam  Constitutional:        Appearance: Normal appearance. She is obese.   HENT:      Head: Normocephalic and atraumatic.   Eyes:      Extraocular Movements: Extraocular movements intact.   Cardiovascular:      Rate and Rhythm: Normal rate and regular rhythm.      Pulses: Normal pulses.      Heart sounds: Normal heart sounds.   Pulmonary:      Breath sounds: Wheezing present.   Abdominal:      General: There is no distension.      Palpations: Abdomen is soft.      Tenderness: There is no abdominal tenderness.   Musculoskeletal:         General: Swelling present.   Skin:     General: Skin is warm.      Capillary Refill: Capillary refill takes less than 2 seconds.   Neurological:      General: No focal deficit present.      Mental Status: She is alert and oriented to person, place, and time.            Significant Labs: All pertinent lab results from the last 24 hours have been reviewed.

## 2024-02-14 NOTE — PROGRESS NOTES
[facility] ([place] [address])  [facility] Medicine  Progress Note    Patient Name: Adriana Strong  MRN: [id number]  Patient Class: IP- Inpatient   Admission Date: 2/9/2024  Length of Stay: 3 days  Attending Physician: Guillermina Kumar MD  Primary Care Provider: Krzysztof Mcgraw MD        Subjective:     Principal Problem:Acute on chronic systolic heart failure        HPI:  79F pmhx HLD, HTN, DM2, s/p TAVR (2019), CAD (pre TAVR 70% iLAD lesion), HFpEF, atrial fibrillation, PPM, COPD previously on home 2L home O2 but seems to not recently been using who presents to the ED from Cardiology clinic for concern of CHF exacerbation and worsening WHITLEY in setting of COPD. Patient reports she cannot walk across the room without getting SOB. Reports increasing bilateral leg swelling. Needs several pillows and 45 degree incline to sleep. Wheezing and using duonebs prn a couple times a week. Also reports chest tightness that occurs 1-5 minutes at a time, at random, unassociated with exercise nor eating, denies heartburn. Denies palpitations, lightheadedness. Patient reports compliance with diuretics and cardiac meds (lasix 40mg am, 20mg pm). Does not add salt to diet nor eats fried foods.    In the ED patient afebrile and hemodynamically stable saturating well on 2L NC.  and significant bilateral pitting edema. CXR without overt acute process. Patient with bilateral rales and slight wheezing. Started on lasix iv and given xopenex x1. Admitted to the care of medicine for further evaluation and management.     Overview/Hospital Course:  Pt sent to ED from cardio to be admitted for acute on chronic HF and exercise intolerance , started on iv lasix and O2 per protocol. Echo 2/10: Regional wall motion abnormalities present. There is moderately reduced systolic function with a visually estimated ejection fraction of 35 - 40%. Consulted cards. Patient started on GDMT. Rec consulting EP. Per EP, concern for pacemaker induced  HF. Changed pacemaker setting. Rec stopping AVN blockers and GDMT. Patient has PET stress test on 2/14. IC consulted for transient ischemic dilation on stress test and concern for triple vessel disease.      Interval History: Cardiology following. Concern for pacemaker induced HF. Rec stopping AVN blockers and GDMT. Patient has PET stress test on 2/14. IC consulted for transient ischemic dilation on stress test and concern for triple vessel disease.         Review of Systems   Constitutional:  Positive for fatigue. Negative for appetite change.   Respiratory:  Positive for shortness of breath. Negative for cough.    Cardiovascular:  Positive for leg swelling. Negative for chest pain.   Gastrointestinal:  Negative for abdominal distention, abdominal pain, constipation and diarrhea.   Genitourinary:  Negative for difficulty urinating and dysuria.   Neurological:  Negative for dizziness and headaches.     Objective:     Vital Signs (Most Recent):  Temp: 98.3 °F (36.8 °C) (02/14/24 1131)  Pulse: 60 (02/14/24 1131)  Resp: 19 (02/14/24 1131)  BP: (!) 127/58 (02/14/24 1131)  SpO2: 95 % (02/14/24 1131) Vital Signs (24h Range):  Temp:  [96.7 °F (35.9 °C)-98.3 °F (36.8 °C)] 98.3 °F (36.8 °C)  Pulse:  [56-62] 60  Resp:  [14-20] 19  SpO2:  [94 %-98 %] 95 %  BP: (118-158)/(58-70) 127/58     Weight: 102.5 kg (226 lb)  Body mass index is 38.79 kg/m².    Intake/Output Summary (Last 24 hours) at 2/14/2024 1312  Last data filed at 2/14/2024 0515  Gross per 24 hour   Intake 1050 ml   Output 600 ml   Net 450 ml           Physical Exam  Constitutional:       Appearance: Normal appearance.   HENT:      Head: Normocephalic and atraumatic.      Mouth/Throat:      Mouth: Mucous membranes are moist.   Cardiovascular:      Rate and Rhythm: Normal rate and regular rhythm.      Pulses: Normal pulses.      Heart sounds: Normal heart sounds.   Pulmonary:      Effort: Pulmonary effort is normal.      Breath sounds: Rales present.   Abdominal:       General: Abdomen is flat. Bowel sounds are normal. There is no distension.      Palpations: Abdomen is soft.      Tenderness: There is no abdominal tenderness.   Musculoskeletal:         General: Normal range of motion.      Cervical back: Normal range of motion and neck supple.      Right lower leg: Edema (improving) present.      Left lower leg: Edema (improving) present.   Skin:     General: Skin is warm and dry.   Neurological:      General: No focal deficit present.      Mental Status: She is alert and oriented to person, place, and time.   Psychiatric:         Mood and Affect: Mood normal.             Significant Labs: All pertinent labs within the past 24 hours have been reviewed.    Significant Imaging: I have reviewed all pertinent imaging results/findings within the past 24 hours.      Assessment/Plan:      * Acute on chronic systolic heart failure  - IP CHF Pathway initiated  - Diuresing with lasix 80mg iv BID  - TTE 2/24: Regional wall motion abnormalities present. There is moderately reduced systolic function with a visually estimated ejection fraction of 35 - 40%   - Patient noted to have reduced EF compared to 55-60% on TTE obtained on 9/23.  - Consulted cardiology. Started on GDMT. Consulted EF. 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 Stopping AVN blockers and GDMT.   - Device interrogation ordered   - PET stress test obtained on 2/14. Consulting IC for transient ischemic dilation on stress test and concern for triple vessel disease.      Pacemaker  EP consulted for concern for pacing induced HF. Recommend   - Stop beta blockers and avoid AV izabella blockers  - EP Changed mode of PPM from DDD-CLS 70bpm to VVI 60bpm  - No more interventions from EP standpoint  - Follow up as outpatient with Dr. Moeller in 3-4 weeks.      S/P TAVR (transcatheter aortic valve replacement)  - continue home meds      COPD (chronic obstructive pulmonary disease)  - current symptoms felt more secondary to CHF over COPD exacerbation.  Though potentially progressive chronic COPD changes without acute exacerbation may also be etiology  - previously on home 2L O2 after 6min walk test  - currently saturating well on 4L O2  - xopenex x1 in ED  - Duonebs q4h prn  - continue home LABA-ICS and symbicort    Persistent atrial fibrillation  - continue home Eliquis  - continue home coreg and diltiazem    Essential hypertension  - continue home meds coreg, losartan, diltiazem    Type 2 diabetes mellitus  - SSI  - hypoglycemic protocol      VTE Risk Mitigation (From admission, onward)           Ordered     apixaban tablet 5 mg  2 times daily         02/09/24 2110                    Discharge Planning   MIMI: 2/15/2024     Code Status: Full Code   Is the patient medically ready for discharge?: No    Reason for patient still in hospital (select all that apply): Patient trending condition  Discharge Plan A: Home with family                  Guillermina Kumar MD  Department of Hospital Medicine   [facility] ([place])

## 2024-02-14 NOTE — NURSING
Patient left the unit at around 0744. Patient is AAO*4 . Patient  was transferred in wheelchair with oxygen on .

## 2024-02-14 NOTE — NURSING
Patient is  AAO*4 . Had  Bladder movement . Complained of chest pressure , EKG  done . Morphine given .Call light within reach ,safety precaution maintained . Will continue monitoring .

## 2024-02-14 NOTE — PROGRESS NOTES
[facility] ([place] [id number])  Cardiology  Progress Note    Patient Name: Adriana Strong  MRN: [id number]  Admission Date: 2/9/2024  Hospital Length of Stay: 3 days  Code Status: Full Code   Attending Physician: Guillermina Kumar MD   Primary Care Physician: Krzysztof Mcgraw MD  Expected Discharge Date: 2/15/2024  Principal Problem:Acute on chronic systolic heart failure    Subjective:     Hospital Course:   No notes on file    Interval History:   Patient was evaluated by EP yesterday. They switched her device settings to 60 VVI and recommended to stop AV izabella blocking agents. They will reevaluate in 4 weeks for possible upgrading to CRT-P in setting of pacemaker induced HF. Patient reports no increasing SOB or CP this AM. She had PET stress done as well.     Review of Systems   Constitutional: Negative for chills, fever and malaise/fatigue.   HENT:  Negative for sore throat.    Eyes:  Negative for blurred vision and visual disturbance.   Cardiovascular:  Positive for chest pain, dyspnea on exertion, leg swelling, orthopnea and palpitations. Negative for syncope.   Respiratory:  Positive for cough and shortness of breath. Negative for hemoptysis and wheezing.    Gastrointestinal:  Negative for abdominal pain, constipation, diarrhea, nausea and vomiting.   Genitourinary:  Negative for dysuria and hematuria.   Psychiatric/Behavioral:  Negative for altered mental status, depression and hallucinations.      Objective:     Vital Signs (Most Recent):  Temp: 98.1 °F (36.7 °C) (02/14/24 0744)  Pulse: 60 (02/14/24 1059)  Resp: 18 (02/14/24 0925)  BP: (!) 146/70 (02/14/24 0820)  SpO2: 98 % (02/14/24 0925) Vital Signs (24h Range):  Temp:  [96.7 °F (35.9 °C)-98.2 °F (36.8 °C)] 98.1 °F (36.7 °C)  Pulse:  [56-62] 60  Resp:  [14-20] 18  SpO2:  [94 %-98 %] 98 %  BP: (118-158)/(59-70) 146/70     Weight: 102.5 kg (226 lb)  Body mass index is 38.79 kg/m².     SpO2: 98 %         Intake/Output Summary (Last 24 hours) at  2/14/2024 1124  Last data filed at 2/14/2024 0515  Gross per 24 hour   Intake 1050 ml   Output 600 ml   Net 450 ml         Lines/Drains/Airways       Drain  Duration             Female External Urinary Catheter w/ Suction 02/09/24 2024 4 days              Peripheral Intravenous Line  Duration                  Peripheral IV - Single Lumen 02/09/24 1706 22 G Posterior;Right Hand 4 days                       Physical Exam  Constitutional:       Appearance: Normal appearance. She is obese.   HENT:      Head: Normocephalic and atraumatic.   Eyes:      Extraocular Movements: Extraocular movements intact.   Cardiovascular:      Rate and Rhythm: Normal rate and regular rhythm.      Pulses: Normal pulses.      Heart sounds: Normal heart sounds.   Pulmonary:      Breath sounds: Wheezing present.   Abdominal:      General: There is no distension.      Palpations: Abdomen is soft.      Tenderness: There is no abdominal tenderness.   Musculoskeletal:         General: Swelling present.   Skin:     General: Skin is warm.      Capillary Refill: Capillary refill takes less than 2 seconds.   Neurological:      General: No focal deficit present.      Mental Status: She is alert and oriented to person, place, and time.            Significant Labs: All pertinent lab results from the last 24 hours have been reviewed.      Assessment and Plan:         * Acute on chronic systolic heart failure  Patient is presenting from cardiology clinic for suspected CHF exacerbation and underlying COPD exacerbation as well. Patient reports that her symptoms began 1 month ago. She reports no inciting factor and reports progressive onset. Patient has WHITLEY and a dry cough. Patient reports that she has been adherent to her medication regimen and has no problems with it.     Patient troponin's wnl, BNP is 210 near baseline. Reviewed EKG which reveals ventricular paced rhythm. Patient's last echo on 2/9/24 revealed newly reduced ejection fraction at 35%.  "Patient has history of permanent pacemaker placement in 2022. And she has a history of TAVR in 2019. PET stress in 2023 revealed "There is a very small (< 5%) sized, mild intensity mid septal and anteroseptal reversible perfusion abnormality involving 3% of LV myocardium in the distribution of the LAD"    Patient is RV paced. Upon review of EKG it appears she is BV paced. But the CXR does not reveal CS leads in light of these findings. We suspect there to be a pacemaker induced heart failure component and would like to involve EP in light of these findings and possibility of upgrading leads. EP evaluated patient and switched her PPM settings to 60 VVI. Plan to re-evaluate patient in 3-4 weeks in clinic. They also recommended stopping AV izabella blocking agents in setting of pace maker induced HF. GDMT also unlikely to provide benefit in setting of pacemaker induced HF so ok to stop medications.   Echo    Interpretation Summary    Left Ventricle: The left ventricle is normal in size. Normal wall thickness. There is concentric remodeling. Regional wall motion abnormalities present. There is moderately reduced systolic function with a visually estimated ejection fraction of 35 - 40%. Ejection fraction by visual approximation is 35%. There is normal diastolic function.    Right Ventricle: Normal right ventricular cavity size. Wall thickness is normal. Right ventricle wall motion  is normal. Systolic function is normal. Pacemaker lead present in the ventricle.    Left Atrium: Left atrium is moderately dilated.    Right Atrium: Right atrium is mildly dilated. Lead present in the right atrium.    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 Aortic valve area by VTI is 1.03 cm². Aortic valve peak velocity is 2.07 m/s. Mean gradient is 11 mmHg. The dimensionless index is 0.30. There is trace aortic regurgitation.    Mitral Valve: There is mild mitral annular calcification present.    " "Tricuspid Valve: There is mild regurgitation.    Pulmonary Artery: There is mild to moderate pulmonary hypertension. The estimated pulmonary artery systolic pressure is 47 mmHg.    IVC/SVC: Normal venous pressure at 3 mmHg.       PET Stress revealed:    "The stress flow is severely reduced diffusely throughout the heart consistent with likely three vessel balanced ischemia. The LV cavity appears to dilate during stress. The gated perfusion images showed an ejection fraction of 61% at rest and 65% during stress. A normal ejection fraction is greater than 47%." Please refer to study for further interpretation.     Recommendations:   - Stopped Diltiazem in setting of newly found reduced EF  - In the setting of pacemaker induced HF, ok to stop GDMT as this will not provide any benefit as EP recommended  - DC patients BB as this can slow AV node conduction   - Follow up in EP clinic in 3-4 weeks to have evaluation/need for upgrading pacemaker to CRT-P    We will sign off, please reach out with any questions. Thank you for your consult           VTE Risk Mitigation (From admission, onward)           Ordered     apixaban tablet 5 mg  2 times daily         02/09/24 2110                    Vinh Monroy DO  Cardiology  [facility] ([facility]-)    "

## 2024-02-14 NOTE — ASSESSMENT & PLAN NOTE
- IP CHF Pathway initiated  - Diuresing with lasix 80mg iv BID  - TTE 2/24: Regional wall motion abnormalities present. There is moderately reduced systolic function with a visually estimated ejection fraction of 35 - 40%   - Patient noted to have reduced EF compared to 55-60% on TTE obtained on 9/23.  - Consulted cardiology. Started on GDMT. Consulted EF. 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 Stopping AVN blockers and GDMT.   - Device interrogation ordered   - PET stress test obtained on 2/14. Consulting IC for transient ischemic dilation on stress test and concern for triple vessel disease.

## 2024-02-14 NOTE — CONSULTS
Consult Note  [facility]  Interventional Cardiology      Adriana Strong  YOB: 1945  Medical Record Number:  [id number]  Attending Physician:  Guillermina Kumar MD   Date of Admission: 2/9/2024       Hospital Day:  3  Current Principal Problem:  Acute on chronic systolic heart failure    HISTORY:     Ms. Strong is a 79 year old female with a PMH of coronary artery disease, hyperlipidemia, hypertension, aortic stenosis status post TAVR (2019), diabetes, has CVA, HFpEF, atrial fibrillation, and a permanent pacemaker who presents as a direct admit from cardiology clinic due to SOB concerning for CHF exacerbation and suspected COPD exacerbation. She is presenting with her daughter who is present at bedside. Patient reports that she has increasing SOB on exertion in the past month or so. She reports that she takes 20 steps then feels like she needs to take a break. Patient reports that she also has chest pain at rest. Patient reports that she did not try to take her nitroglycerin because the pain comes and goes and its hard to pin point. Patient's  manages most of her medication. She reports that she has been compliant and has not missed any doses.     IC was consulted for new EF drop to 35-40% and abnormal PET stress test today showing TID.     Medications - Outpatient  Prior to Admission medications    Medication Sig Start Date End Date Taking? Authorizing Provider   acetaminophen (TYLENOL) 650 MG TbSR Take 1,300 mg by mouth 2 (two) times daily as needed.   Yes Provider, Historical   albuterol-ipratropium (DUO-NEB) 2.5 mg-0.5 mg/3 mL nebulizer solution Take 3 mLs by nebulization every 4 (four) hours as needed for Wheezing or Shortness of Breath. 10/26/20  Yes Provider, Historical   apixaban (ELIQUIS) 5 mg Tab TAKE 1 TABLET BY MOUTH TWICE A DAY 5/9/23  Yes Rancho Mariee MD   ascorbic acid, vitamin C, (VITAMIN C) 500 MG tablet Take 1,000 mg by mouth once daily.    Yes Provider, Historical    atorvastatin (LIPITOR) 10 MG tablet Take 1 tablet (10 mg total) by mouth once daily. 9/26/23 9/25/24 Yes Larry Agee MD   calcium carbonate/vitamin D3 (CALTRATE 600 + D ORAL) Take 1 tablet by mouth once daily.   Yes Provider, Historical   carvediloL (COREG) 12.5 MG tablet Take 1 tablet (12.5 mg total) by mouth 2 (two) times daily. 4/5/23 4/4/24 Yes Rancho Mariee MD   clopidogreL (PLAVIX) 75 mg tablet TAKE 1 TABLET BY MOUTH EVERY DAY 9/5/23  Yes Oli Jeff MD   diltiaZEM (CARDIZEM CD) 240 MG 24 hr capsule TAKE 1 CAPSULE BY MOUTH ONCE A DAY 5/26/23  Yes Rancho Mariee MD   DULoxetine (CYMBALTA) 20 MG capsule Take 1 capsule (20 mg total) by mouth once daily. 9/26/23 9/25/24 Yes Larry gAee MD   empagliflozin (JARDIANCE) 10 mg tablet Take 1 tablet (10 mg total) by mouth once daily. 10/2/23 10/1/24 Yes Khloe Andujar PA-C   fexofenadine (ALLEGRA) 60 MG tablet Take 60 mg by mouth once daily.   Yes Provider, Historical   fish oil-omega-3 fatty acids 300-1,000 mg capsule Take 1 g by mouth once daily.    Yes Provider, Historical   furosemide (LASIX) 40 MG tablet Take 40mg (1 tablet) in the morning and 20mg (1/2 tablet) in the afternoon 10/27/23  Yes Khloe Andujar PA-C   gabapentin (NEURONTIN) 600 MG tablet Take 600 mg by mouth 3 (three) times daily.   Yes Provider, Historical   isosorbide mononitrate (IMDUR) 60 MG 24 hr tablet Take 1 tablet (60 mg total) by mouth once daily. 9/26/23 9/25/24 Yes Larry Agee MD   LORazepam (ATIVAN) 0.5 MG tablet Take 1 tablet (0.5 mg total) by mouth every morning. 8/12/22 2/11/24 Yes Ruddy Funes MD   losartan (COZAAR) 50 MG Tab Take 1 tablet (50 mg total) by mouth once daily. 12/18/23 12/17/24 Yes Nate Landis MD   metFORMIN (GLUCOPHAGE) 500 MG tablet Take 1 tablet (500 mg total) by mouth 2 (two) times daily. 9/26/23 9/25/24 Yes Larry Agee MD   montelukast (SINGULAIR) 10 mg tablet Take 10 mg by mouth once daily. 3/4/21  Yes Provider, Historical    mv-mn/folic ac/calcium/vit K1 (WOMEN'S 50 PLUS MULTIVITAMIN ORAL) Take 1 tablet by mouth once daily.   Yes Provider, Historical   nitroGLYCERIN (NITROSTAT) 0.4 MG SL tablet Place 1 tablet (0.4 mg total) under the tongue every 5 (five) minutes as needed for Chest pain. 8/12/22 2/11/24 Yes Ruddy Funes MD   pantoprazole (PROTONIX) 40 MG tablet Take 1 tablet (40 mg total) by mouth once daily. 8/12/22  Yes Ruddy Funes MD   potassium gluconate 550 mg (90 mg) Tab Take 180 mg by mouth 2 (two) times a day.   Yes Provider, Historical   ranolazine (RANEXA) 1,000 mg Tb12 Take 1 tablet (1,000 mg total) by mouth 2 (two) times daily. 10/19/23 10/18/24 Yes Nate Landis MD   SYMBICORT 160-4.5 mcg/actuation HFAA Inhale 1 puff into the lungs 2 (two) times daily. 5/20/22   Provider, Historical         Medications - Current  Scheduled Meds:   apixaban  5 mg Oral BID    atorvastatin  10 mg Oral Daily    clopidogreL  75 mg Oral Daily    DULoxetine  20 mg Oral Daily    empagliflozin  10 mg Oral Daily    fluticasone furoate-vilanteroL  1 puff Inhalation Daily    furosemide (LASIX) injection  80 mg Intravenous BID    gabapentin  600 mg Oral TID    isosorbide mononitrate  60 mg Oral Daily    LORazepam  0.5 mg Oral QAM    [START ON 2/15/2024] losartan  50 mg Oral Daily    montelukast  10 mg Oral Daily    pantoprazole  40 mg Oral Daily    polyethylene glycol  17 g Oral BID    ranolazine  1,000 mg Oral BID    senna  8.6 mg Oral Nightly     Continuous Infusions:  PRN Meds:.acetaminophen, albuterol-ipratropium, dextrose 10%, dextrose 10%, glucagon (human recombinant), glucose, glucose, insulin aspart U-100, ondansetron, oxyCODONE, sodium chloride 0.9%      Allergies  Review of patient's allergies indicates:   Allergen Reactions    Celebrex [celecoxib] Shortness Of Breath    Ciprofloxacin Swelling     lip    Dicyclomine Hives    Adhesive Dermatitis    Avelox [moxifloxacin] Swelling    Cilostazol Other (See Comments)     Elevates blood  pressure    Eryc [erythromycin] Other (See Comments)     unknown    Iodine and iodide containing products Hives    Keflex [cephalexin] Hives    Meclomen      rashes    Meloxicam      Ear ringing    Metoclopramide Other (See Comments)     High blood pressure    Sulfa (sulfonamide antibiotics) Itching         Past Medical History  Past Medical History:   Diagnosis Date    Acute on chronic diastolic (congestive) heart failure 11/20/2019    Anticoagulant long-term use     on Plavix since May 2015    Anxiety     Arthritis     Asthma     Atrial fibrillation     Atrial fibrillation with RVR 10/19/2020    Cataracts, bilateral     Chest pain, atypical     Chronic atrial fibrillation with rapid ventricular response 6/23/2017    Colon polyp     Coronary artery disease     Diabetes mellitus     Dry eyes     Esophageal erosions     GERD (gastroesophageal reflux disease)     Heart murmur     Hemorrhoid     High cholesterol     Hypertension     Irritable bowel syndrome     Paroxysmal atrial fibrillation 10/30/2020    Persistent atrial fibrillation 2/10/2020    Shingles 2015    Stenosis of aortic and mitral valves     Stroke     TIA (transient ischemic attack)     Use of cane as ambulatory aid          Past Surgical History  Past Surgical History:   Procedure Laterality Date    ABDOMINAL SURGERY      stents to SMA    angiocele      2007, 2014    AORTIC VALVE REPLACEMENT N/A 11/19/2019    Procedure: Replacement-valve-aortic;  Surgeon: Jack Capone MD;  Location: [facility] CATH LAB;  Service: Cardiology;  Laterality: N/A;    BREAST SURGERY      left--- a lump--- no cancer    CARDIAC VALVE SURGERY      COLONOSCOPY  2014    COLONOSCOPY N/A 3/14/2018    Procedure: COLONOSCOPY;  Surgeon: Efren Kemp MD;  Location: [facility] ENDO ([address] [place] [address]);  Service: Endoscopy;  Laterality: N/A;  ok to hold Plavix 5 days prior to procedure per Dr RESHMA Hernandez     ok per Dr Kemp to hold Eliquis 2 days prior to procedure (see telephone encounter dated-2/7/18)    HERNIA  REPAIR      HYSTERECTOMY  partial    1982 partial hysterectomy    LEFT HEART CATHETERIZATION Right 11/5/2019    Procedure: Left heart cath;  Surgeon: Jack Capone MD;  Location: [facility] CATH LAB;  Service: Cardiology;  Laterality: Right;    left nasal polyp      left neck lymph node      nose polyp      right hip fatty mass tissue      stent to small intestine      SUPERIOR VENA CAVA ANGIOPLASTY / STENTING      TONSILLECTOMY      TOTAL ABDOMINAL HYSTERECTOMY      2014    TOTAL KNEE ARTHROPLASTY Bilateral     TREATMENT OF CARDIAC ARRHYTHMIA N/A 2/10/2020    Procedure: CARDIOVERSION;  Surgeon: Jayy Parrish MD;  Location: [facility] EP LAB;  Service: Cardiology;  Laterality: N/A;  AF, PEDRO, DCCV, MAC, DM, 3 Prep    TREATMENT OF CARDIAC ARRHYTHMIA N/A 5/15/2020    Procedure: CARDIOVERSION;  Surgeon: Hany Bee MD;  Location: [facility] EP LAB;  Service: Cardiology;  Laterality: N/A;  AF, PEDRO, DCCV, MAC, DM, 3 Prep    UPPER GASTROINTESTINAL ENDOSCOPY  2014         Social History  Social History     Socioeconomic History    Marital status:    Tobacco Use    Smoking status: Never    Smokeless tobacco: Never   Substance and Sexual Activity    Alcohol use: No    Drug use: No    Sexual activity: Not Currently     Social Determinants of Health     Financial Resource Strain: Low Risk  (2/12/2024)    Overall Financial Resource Strain (CARDIA)     Difficulty of Paying Living Expenses: Not hard at all   Food Insecurity: No Food Insecurity (2/12/2024)    Hunger Vital Sign     Worried About Running Out of Food in the Last Year: Never true     Ran Out of Food in the Last Year: Never true   Transportation Needs: No Transportation Needs (2/12/2024)    PRAPARE - Transportation     Lack of Transportation (Medical): No     Lack of Transportation (Non-Medical): No   Physical Activity: Insufficiently Active (2/12/2024)    Exercise Vital Sign     Days of Exercise per Week: 4 days     Minutes of Exercise per Session: 20 min   Stress:  Stress Concern Present (2/12/2024)    [place] citizen [employer] - Occupational Stress Questionnaire     Feeling of Stress : To some extent   Social Connections: Moderately Integrated (2/12/2024)    Social Connection and Isolation Panel [NHANES]     Frequency of Communication with Friends and Family: Twice a week     Frequency of Social Gatherings with Friends and Family: Once a week     Attends Baptism Services: 1 to 4 times per year     Active Member of Clubs or Organizations: No     Attends Club or Organization Meetings: Never     Marital Status:    Housing Stability: Low Risk  (2/12/2024)    Housing Stability Vital Sign     Unable to Pay for Housing in the Last Year: No     Number of Places Lived in the Last Year: 1     Unstable Housing in the Last Year: No         ROS:     Constitutional: Negative for chills, fever and malaise/fatigue.   HENT:  Negative for sore throat.    Eyes:  Negative for blurred vision and visual disturbance.   Cardiovascular:  Positive for chest pain, dyspnea on exertion, leg swelling, orthopnea and palpitations. Negative for syncope.   Respiratory:  Positive for cough and shortness of breath. Negative for hemoptysis and wheezing.    Gastrointestinal:  Negative for abdominal pain, constipation, diarrhea, nausea and vomiting.   Genitourinary:  Negative for dysuria and hematuria.   Psychiatric/Behavioral:  Negative for altered mental status, depression and hallucinations.        PHYSICAL EXAM:     Vital Signs  Vitals  Temp: 98.3 °F (36.8 °C)  Temp Source: Oral  Pulse: (!) 59  Heart Rate Source: Monitor  Resp: 19  SpO2: 95 %  Pulse Oximetry Type: Intermittent  Flow (L/min): 2  Oxygen Concentration (%): 36  BP: (!) 127/58  MAP (mmHg): 84  BP Location: Right arm  BP Method: Automatic  Patient Position: Lying          24 Hour VS Range    Temp:  [97.8 °F (36.6 °C)-98.3 °F (36.8 °C)]   Pulse:  [56-62]   Resp:  [14-20]   BP: (118-147)/(58-70)   SpO2:  [94 %-98 %]    "  Intake/Output Summary (Last 24 hours) at 2/14/2024 1538  Last data filed at 2/14/2024 0515  Gross per 24 hour   Intake 1050 ml   Output 600 ml   Net 450 ml         Constitutional:       Appearance: Normal appearance. She is obese.   HENT:      Head: Normocephalic and atraumatic.   Eyes:      Extraocular Movements: Extraocular movements intact.   Cardiovascular:      Rate and Rhythm: Normal rate and regular rhythm.      Pulses: Normal pulses.      Heart sounds: Normal heart sounds.   Abdominal:      General: There is no distension.      Palpations: Abdomen is soft.      Tenderness: There is no abdominal tenderness.   Musculoskeletal:         General: No leg edema anymore  Skin:     General: Skin is warm.      Capillary Refill: Capillary refill takes less than 2 seconds.   Neurological:      General: No focal deficit present.      Mental Status: She is alert and oriented to person, place, and time    DATA:       Recent Labs   Lab 02/09/24  1708 02/10/24  0502 02/13/24  0452   WBC 7.02 6.99 6.77   HGB 12.8 12.2 12.0   HCT 40.3 38.4 37.2    222 216        No results for input(s): "PROTIME", "INR" in the last 168 hours.     Recent Labs   Lab 02/11/24  0554 02/12/24  0553 02/13/24  0452 02/13/24  1539 02/14/24  0502    134* 134* 136 133*   K 3.6 3.5 3.5 3.9 3.5   CL 98 94* 93* 95 91*   CO2 33* 30* 34* 32* 33*   BUN 18 17 18 19 18   CREATININE 0.8 0.7 0.7 0.9 0.7   ANIONGAP 7* 10 7* 9 9   CALCIUM 9.0 9.0 8.9 9.0 9.0        Recent Labs   Lab 02/09/24  1708   PROT 7.4   ALBUMIN 3.7   BILITOT 0.3   ALKPHOS 44*   AST 31   ALT 14        Recent Labs   Lab 02/09/24  1708 02/13/24  1539   TROPONINI <0.006 0.011        BNP (pg/mL)   Date Value   02/09/2024 210 (H)   10/27/2023 251 (H)   10/17/2023 223 (H)   10/02/2023 219 (H)   09/21/2023 395 (H)       No results for input(s): "LABBLOO" in the last 168 hours.       ASSESSMENT/PLAN:     #New onset systolic dysfunction  #Abnormal PET stress test    -New EF drop to " 35-40%, PET stress showing TID and abnormal perfusion   -Also has Afib and RV pacing burden of 100%, EP on board      Recommendations  -NPO midnight for LHC +/- PCI tomorrow  -will prep with prednisone, pepcid and benadryl x 3 doses each q6h due to contrast allergy    - LHC +/- PCI, patient is a NICK candidate  - Anti-platelet Therapy: Plavix (and Eliquis)  - Access: Right CFA due to TAVR   - Creatinine: 0.9  - Allergies: No shellfish / Iodine allergy  - Pre-Hydration: None   - Pre-Op Med: Bendaryl 50mg pO and Predniosone per protocol due to cont allergy  - All patient's questions were answered.  -The risks, benefits and alternatives of the procedure were explained to the patient.   -The risks of coronary angiography include but are not limited to: bleeding, infection, heart rhythm abnormalities, allergic reactions, kidney injury and potential need for dialysis, stroke and death.   - Should stenting be indicated, the patient has agreed to dual anti-platelet therapy for 1-consecutive year with a drug-eluting stent and a minimum of 1-month with the use of a bare metal stent  - Additionally, pt is aware that non-compliance is likely to result in stent clotting with heart attack, heart failure, and/or death  -The risks of moderate sedation include hypotension, respiratory depression, arrhythmias, bronchospasm, and death.   - Informed consent was obtained and the  patient is agreeable to proceed with the procedure.        Signed:  Blade Jose M.D. PGY-5  Cardiovascular Fellow  [facility]     I have seen the patient, reviewed the Fellow's history and physical, assessment and plan. I have personally interviewed and examined the patient and agree with the findings.     JOY Baptiste MD

## 2024-02-14 NOTE — PROGRESS NOTES
"[facility]) nurse navigator notified of [facility] candidate in need of education and introduction to 4-6 week program.      PT aao x 3 sitting in bed at 90 degrees, breathing regular with mild work of breathing, NVD, O2 NC in place with p[t  and daughter, Tammy at bedside. Introduced self to pt as [facility] nurse navigator.     Patient given "[facility] Home Care Guide" which includes "Daily weight and symptom tracker".  Encouraged pt and caregiver to review information.      Reviewed the following key points of [facility] program with pt and family:   1.) Take your medications as directed.    2.) Weight yourself daily   3.) Follow low salt and limited fluid diet.    4.) Stop smoking and start exercising   5.) Go to your appointments and call your team.      Pt reminded to follow Symptom tracker and to call at the onset of symptoms according to tracker.     Reviewed plan for follow up once discharged to include phone calls, in person and virtual visits to assist pt optimizing their heart failure medication regimen and encouraging healthy lifestyle modifications.  Reminded pt that program will assist them over the next 4-6 weeks and then patient will be transferred to long term care provider .  Reminded pt how to contact [facility] navigator via phone and or via [employer].     Pt instructed appointment will be printed on hospital discharge paperwork. Pt and family prefer mid morning aft 10 AM.     Pt also reminded RN will call 48-72 hours after discharge to check on them.     PT and family verbalize read back of information given.  Encouraged pt and family to read over information often and contact team with any questions or concerns.   "

## 2024-02-14 NOTE — SUBJECTIVE & OBJECTIVE
Interval History: Patient OFT for LHC. Resume diet post procedure         Objective:     Vital Signs (Most Recent):  Temp: 98.3 °F (36.8 °C) (02/14/24 1131)  Pulse: 60 (02/14/24 1131)  Resp: 19 (02/14/24 1131)  BP: (!) 127/58 (02/14/24 1131)  SpO2: 95 % (02/14/24 1131) Vital Signs (24h Range):  Temp:  [96.7 °F (35.9 °C)-98.3 °F (36.8 °C)] 98.3 °F (36.8 °C)  Pulse:  [56-62] 60  Resp:  [14-20] 19  SpO2:  [94 %-98 %] 95 %  BP: (118-158)/(58-70) 127/58     Weight: 102.5 kg (226 lb)  Body mass index is 38.79 kg/m².    Intake/Output Summary (Last 24 hours) at 2/14/2024 1312  Last data filed at 2/14/2024 0515  Gross per 24 hour   Intake 1050 ml   Output 600 ml   Net 450 ml                Significant Labs: All pertinent labs within the past 24 hours have been reviewed.    Significant Imaging: I have reviewed all pertinent imaging results/findings within the past 24 hours.

## 2024-02-15 LAB
ABO + RH BLD: NORMAL
ANION GAP SERPL CALC-SCNC: 11 MMOL/L (ref 8–16)
BLD GP AB SCN CELLS X3 SERPL QL: NORMAL
BUN SERPL-MCNC: 24 MG/DL (ref 8–23)
CALCIUM SERPL-MCNC: 9.2 MG/DL (ref 8.7–10.5)
CHLORIDE SERPL-SCNC: 94 MMOL/L (ref 95–110)
CO2 SERPL-SCNC: 32 MMOL/L (ref 23–29)
CREAT SERPL-MCNC: 0.9 MG/DL (ref 0.5–1.4)
EST. GFR  (NO RACE VARIABLE): >60 ML/MIN/1.73 M^2
GLUCOSE SERPL-MCNC: 138 MG/DL (ref 70–110)
POCT GLUCOSE: 161 MG/DL (ref 70–110)
POCT GLUCOSE: 197 MG/DL (ref 70–110)
POTASSIUM SERPL-SCNC: 3.5 MMOL/L (ref 3.5–5.1)
SODIUM SERPL-SCNC: 137 MMOL/L (ref 136–145)
SPECIMEN OUTDATE: NORMAL

## 2024-02-15 PROCEDURE — 25000003 PHARM REV CODE 250: Performed by: INTERNAL MEDICINE

## 2024-02-15 PROCEDURE — 25500020 PHARM REV CODE 255: Performed by: INTERNAL MEDICINE

## 2024-02-15 PROCEDURE — 99233 SBSQ HOSP IP/OBS HIGH 50: CPT | Mod: ,,, | Performed by: INTERNAL MEDICINE

## 2024-02-15 PROCEDURE — C1769 GUIDE WIRE: HCPCS | Performed by: INTERNAL MEDICINE

## 2024-02-15 PROCEDURE — 80048 BASIC METABOLIC PNL TOTAL CA: CPT | Performed by: FAMILY MEDICINE

## 2024-02-15 PROCEDURE — B2111ZZ FLUOROSCOPY OF MULTIPLE CORONARY ARTERIES USING LOW OSMOLAR CONTRAST: ICD-10-PCS | Performed by: INTERNAL MEDICINE

## 2024-02-15 PROCEDURE — 99152 MOD SED SAME PHYS/QHP 5/>YRS: CPT | Performed by: INTERNAL MEDICINE

## 2024-02-15 PROCEDURE — 99232 SBSQ HOSP IP/OBS MODERATE 35: CPT | Mod: GC,,, | Performed by: INTERNAL MEDICINE

## 2024-02-15 PROCEDURE — 63600175 PHARM REV CODE 636 W HCPCS: Performed by: INTERNAL MEDICINE

## 2024-02-15 PROCEDURE — 82962 GLUCOSE BLOOD TEST: CPT | Performed by: INTERNAL MEDICINE

## 2024-02-15 PROCEDURE — 25000003 PHARM REV CODE 250: Performed by: FAMILY MEDICINE

## 2024-02-15 PROCEDURE — 20600001 HC STEP DOWN PRIVATE ROOM

## 2024-02-15 PROCEDURE — 93454 CORONARY ARTERY ANGIO S&I: CPT | Mod: 26,,, | Performed by: INTERNAL MEDICINE

## 2024-02-15 PROCEDURE — 86850 RBC ANTIBODY SCREEN: CPT | Performed by: STUDENT IN AN ORGANIZED HEALTH CARE EDUCATION/TRAINING PROGRAM

## 2024-02-15 PROCEDURE — 93454 CORONARY ARTERY ANGIO S&I: CPT | Performed by: INTERNAL MEDICINE

## 2024-02-15 PROCEDURE — 63600175 PHARM REV CODE 636 W HCPCS: Performed by: FAMILY MEDICINE

## 2024-02-15 PROCEDURE — 99152 MOD SED SAME PHYS/QHP 5/>YRS: CPT | Mod: ,,, | Performed by: INTERNAL MEDICINE

## 2024-02-15 PROCEDURE — C1894 INTRO/SHEATH, NON-LASER: HCPCS | Performed by: INTERNAL MEDICINE

## 2024-02-15 PROCEDURE — 36415 COLL VENOUS BLD VENIPUNCTURE: CPT | Performed by: FAMILY MEDICINE

## 2024-02-15 PROCEDURE — 36415 COLL VENOUS BLD VENIPUNCTURE: CPT | Mod: XB | Performed by: STUDENT IN AN ORGANIZED HEALTH CARE EDUCATION/TRAINING PROGRAM

## 2024-02-15 PROCEDURE — 25000003 PHARM REV CODE 250: Performed by: HOSPITALIST

## 2024-02-15 RX ORDER — ASPIRIN 325 MG
325 TABLET ORAL ONCE
Status: COMPLETED | OUTPATIENT
Start: 2024-02-15 | End: 2024-02-15

## 2024-02-15 RX ORDER — HEPARIN SOD,PORCINE/0.9 % NACL 1000/500ML
INTRAVENOUS SOLUTION INTRAVENOUS
Status: DISCONTINUED | OUTPATIENT
Start: 2024-02-15 | End: 2024-02-15 | Stop reason: HOSPADM

## 2024-02-15 RX ORDER — VANCOMYCIN/0.9 % SOD CHLORIDE 1 G/100 ML
PLASTIC BAG, INJECTION (ML) INTRAVENOUS
Status: DISCONTINUED | OUTPATIENT
Start: 2024-02-15 | End: 2024-02-16 | Stop reason: HOSPADM

## 2024-02-15 RX ORDER — SODIUM CHLORIDE 9 MG/ML
INJECTION, SOLUTION INTRAVENOUS CONTINUOUS
Status: DISCONTINUED | OUTPATIENT
Start: 2024-02-15 | End: 2024-02-15

## 2024-02-15 RX ORDER — ACETAMINOPHEN 325 MG/1
650 TABLET ORAL EVERY 4 HOURS PRN
Status: DISCONTINUED | OUTPATIENT
Start: 2024-02-15 | End: 2024-02-16 | Stop reason: HOSPADM

## 2024-02-15 RX ORDER — ASPIRIN 325 MG
TABLET ORAL
Status: DISCONTINUED | OUTPATIENT
Start: 2024-02-15 | End: 2024-02-15 | Stop reason: HOSPADM

## 2024-02-15 RX ORDER — LIDOCAINE HYDROCHLORIDE 20 MG/ML
INJECTION, SOLUTION EPIDURAL; INFILTRATION; INTRACAUDAL; PERINEURAL
Status: DISCONTINUED | OUTPATIENT
Start: 2024-02-15 | End: 2024-02-15 | Stop reason: HOSPADM

## 2024-02-15 RX ORDER — ONDANSETRON 8 MG/1
8 TABLET, ORALLY DISINTEGRATING ORAL EVERY 8 HOURS PRN
Status: DISCONTINUED | OUTPATIENT
Start: 2024-02-15 | End: 2024-02-15

## 2024-02-15 RX ORDER — DIPHENHYDRAMINE HCL 50 MG
50 CAPSULE ORAL ONCE
Status: COMPLETED | OUTPATIENT
Start: 2024-02-15 | End: 2024-02-15

## 2024-02-15 RX ORDER — MIDAZOLAM HYDROCHLORIDE 1 MG/ML
INJECTION, SOLUTION INTRAMUSCULAR; INTRAVENOUS
Status: DISCONTINUED | OUTPATIENT
Start: 2024-02-15 | End: 2024-02-15 | Stop reason: HOSPADM

## 2024-02-15 RX ORDER — FENTANYL CITRATE 50 UG/ML
INJECTION, SOLUTION INTRAMUSCULAR; INTRAVENOUS
Status: DISCONTINUED | OUTPATIENT
Start: 2024-02-15 | End: 2024-02-15 | Stop reason: HOSPADM

## 2024-02-15 RX ORDER — CLOPIDOGREL BISULFATE 300 MG/1
600 TABLET, FILM COATED ORAL ONCE
Status: DISCONTINUED | OUTPATIENT
Start: 2024-02-15 | End: 2024-02-15

## 2024-02-15 RX ADMIN — DIPHENHYDRAMINE HYDROCHLORIDE 50 MG: 50 CAPSULE ORAL at 12:02

## 2024-02-15 RX ADMIN — FAMOTIDINE 20 MG: 20 TABLET, FILM COATED ORAL at 12:02

## 2024-02-15 RX ADMIN — GABAPENTIN 600 MG: 300 CAPSULE ORAL at 08:02

## 2024-02-15 RX ADMIN — SENNOSIDES 8.6 MG: 8.6 TABLET, FILM COATED ORAL at 08:02

## 2024-02-15 RX ADMIN — ASPIRIN 325 MG ORAL TABLET 325 MG: 325 PILL ORAL at 08:02

## 2024-02-15 RX ADMIN — POLYETHYLENE GLYCOL 3350 17 G: 17 POWDER, FOR SOLUTION ORAL at 08:02

## 2024-02-15 RX ADMIN — LORAZEPAM 0.5 MG: 0.5 TABLET ORAL at 06:02

## 2024-02-15 RX ADMIN — PREDNISONE 50 MG: 50 TABLET ORAL at 12:02

## 2024-02-15 RX ADMIN — CLOPIDOGREL BISULFATE 75 MG: 75 TABLET ORAL at 08:02

## 2024-02-15 RX ADMIN — DIPHENHYDRAMINE HYDROCHLORIDE 50 MG: 50 CAPSULE ORAL at 06:02

## 2024-02-15 RX ADMIN — FUROSEMIDE 80 MG: 10 INJECTION, SOLUTION INTRAVENOUS at 08:02

## 2024-02-15 RX ADMIN — FAMOTIDINE 20 MG: 20 TABLET, FILM COATED ORAL at 06:02

## 2024-02-15 RX ADMIN — DIPHENHYDRAMINE HYDROCHLORIDE 50 MG: 50 CAPSULE ORAL at 08:02

## 2024-02-15 RX ADMIN — ACETAMINOPHEN 650 MG: 325 TABLET ORAL at 12:02

## 2024-02-15 RX ADMIN — RANOLAZINE 1000 MG: 1000 TABLET, FILM COATED, EXTENDED RELEASE ORAL at 08:02

## 2024-02-15 RX ADMIN — GABAPENTIN 600 MG: 300 CAPSULE ORAL at 03:02

## 2024-02-15 RX ADMIN — PREDNISONE 50 MG: 50 TABLET ORAL at 06:02

## 2024-02-15 NOTE — NURSING TRANSFER
Nursing Transfer Note      2/15/2024   2:26 PM    Nurse giving handoff:Chester Rodriguez sscu RN  Nurse receiving handoff:Ela CRUZ RN    Reason patient is being transferred: short stay recovery complete    [place] [facility] [place]    Transfer via stretcher      Transported by hospital transport    Transfer Vital Signs:  Blood Pressure:see epic  Heart Rate:see epic  O2:see epic  Temperature:see epic  Respirations:see epic    Telemetry: Rhythm paced  Order for Tele Monitor? Yes    Additional Lines: none      Medicines sent: none    Any special needs or follow-up needed: Bedrest until 4pm    Patient belongings transferred with patient: Yes    Chart send with patient: Yes    Notified: daughter    Patient reassessed at: see epic (date, time)    Upon arrival to floor: Pt to be oriented to room upon arrival to floor

## 2024-02-15 NOTE — NURSING
Dressing change to right groin puncture site, blood from right thigh wound getting into puncture dressing, dressing not sealed. Right thigh puncture dressing with light brown small dot of blood. No oozing, bleeding or hematoma noted from right thigh puncture; covered with gauze and tegaderm. Family at bedside, up on plan for patient to stay tonight with possible discharge on tomorrow. Pt remains stable. Decreased pain. VSS.     Pt ready for transfer to room per Dr Murguia.

## 2024-02-15 NOTE — NURSING
Right groin continues to weep slowly. Removed dressing, recovered with quikclot fixed in place to right groin stick and sealed with tegaderm. Right groin irritation with moist blood covered with tegaderm and vaseline gauze remains in place. Bruising to area. No hematoma noted. Damon CORLEY contacted and came to bedside to assess. MD requested to watch patient an additional hour in sscu. No new interventions at this time.

## 2024-02-15 NOTE — PLAN OF CARE
Dr Murguia performed lido/epi 10ml injection to right groin directly into puncture site. Bleeding slowed to less than a trickle, covered with gauze and tegaderm. Bedrest extended to 4pm.

## 2024-02-15 NOTE — NURSING
AA0*4. Had Bladder movement , walked to the bathroom with walker and one person assistance . Patient need to be in Npo from midnight  for tomorrow and  stop anti platelets for  procedure as per  note of Interventional Cardiology. Call light within reach , safety precaution maintained . Will  continue monitoring .

## 2024-02-15 NOTE — NURSING
Patient had angio today, no blockages, Ef 35-40 %. R groin site no hematoma and pulses palplable. Patient was able to ambulate to bathroom with a walker and BM successful. Purewick in place. Daughter at bedside and no concerns voiced at this time

## 2024-02-15 NOTE — SUBJECTIVE & OBJECTIVE
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 Patient will be on post cath protocol. Patient produced 1.3 L of urine in 24 hour period. Patient net negative 7.6 L.    Review of Systems   Constitutional: Negative for chills, fever and malaise/fatigue.   HENT:  Negative for sore throat.    Eyes:  Negative for blurred vision and visual disturbance.   Cardiovascular:  Positive for chest pain, dyspnea on exertion, leg swelling, orthopnea and palpitations. Negative for syncope.   Respiratory:  Positive for cough and shortness of breath. Negative for hemoptysis and wheezing.    Gastrointestinal:  Negative for abdominal pain, constipation, diarrhea, nausea and vomiting.   Genitourinary:  Negative for dysuria and hematuria.   Psychiatric/Behavioral:  Negative for altered mental status, depression and hallucinations.      Objective:     Vital Signs (Most Recent):  Temp: 97.3 °F (36.3 °C) (02/15/24 1005)  Pulse: 60 (02/15/24 1100)  Resp: 17 (02/15/24 1100)  BP: (!) 124/58 (02/15/24 1100)  SpO2: 98 % (02/15/24 1005) Vital Signs (24h Range):  Temp:  [97.3 °F (36.3 °C)-98.6 °F (37 °C)] 97.3 °F (36.3 °C)  Pulse:  [59-62] 60  Resp:  [16-20] 17  SpO2:  [91 %-99 %] 98 %  BP: (124-163)/(55-73) 124/58     Weight: 102.5 kg (226 lb)  Body mass index is 38.79 kg/m².     SpO2: 98 %         Intake/Output Summary (Last 24 hours) at 2/15/2024 1124  Last data filed at 2/15/2024 0631  Gross per 24 hour   Intake 600 ml   Output 950 ml   Net -350 ml         Lines/Drains/Airways       Drain  Duration             Female External Urinary Catheter w/ Suction 02/09/24 2024 5 days              Peripheral Intravenous Line  Duration                  Peripheral IV - Single Lumen 02/09/24 1706 22 G Posterior;Right Hand 5 days         Peripheral IV - Single Lumen 02/15/24 0848 22 G Anterior;Right Upper Arm <1 day                       Physical Exam  Constitutional:       Appearance: Normal appearance. She is obese.    HENT:      Head: Normocephalic and atraumatic.   Eyes:      Extraocular Movements: Extraocular movements intact.   Cardiovascular:      Rate and Rhythm: Normal rate and regular rhythm.      Pulses: Normal pulses.      Heart sounds: Normal heart sounds.   Pulmonary:      Breath sounds: Wheezing present.   Abdominal:      General: There is no distension.      Palpations: Abdomen is soft.      Tenderness: There is no abdominal tenderness.   Musculoskeletal:         General: Swelling present.   Skin:     General: Skin is warm.      Capillary Refill: Capillary refill takes less than 2 seconds.   Neurological:      General: No focal deficit present.      Mental Status: She is alert and oriented to person, place, and time.            Significant Labs: All pertinent lab results from the last 24 hours have been reviewed.

## 2024-02-15 NOTE — ASSESSMENT & PLAN NOTE
- IP CHF Pathway initiated  - Diuresing with lasix 80mg iv BID  - TTE 2/24: Regional wall motion abnormalities present. There is moderately reduced systolic function with a visually estimated ejection fraction of 35 - 40%   - Patient noted to have reduced EF compared to 55-60% on TTE obtained on 9/23.  - Consulted cardiology. Started on GDMT. Consulted EF. 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 Stopping AVN blockers and GDMT.   - Device interrogation ordered   - PET stress test obtained on 2/14. Consulting IC for transient ischemic dilation on stress test and concern for triple vessel disease.    - S/p LHC on 2/15

## 2024-02-15 NOTE — PROGRESS NOTES
"The patient has been examined and the H&P has been reviewed:    I concur with the findings and no changes have occurred since H&P was written.    Procedure risks, benefits and alternative options discussed and understood by patient/family.    Physical Examination  BP (!) 149/67 (BP Location: Right arm, Patient Position: Lying)   Pulse 62   Temp 97.5 °F (36.4 °C) (Oral)   Resp 20   Ht 5' 4" (1.626 m)   Wt 102.5 kg (226 lb)   LMP 01/21/1973 (LMP Unknown)   SpO2 98%   BMI 38.79 kg/m²     Constitutional: Awake, alert, oriented, no acute distress, conversant  HEENT: Sclera anicteric, Pupils equal, round and reactive to light, extraocular motions intact  Neck: No JVD, no carotid bruits  Cardiovascular: normal rate, regular rhythm  Pulmonary: Clear to auscultation bilaterally  Abdominal: Abdomen soft, nontender, nondistended  Skin: No ecchymosis, erythema, or ulcers  Psych: Alert and oriented x 3, appropriate affect  Neuro: CNII-XII intact, no focal deficits        Active Hospital Problems    Diagnosis  POA    *Acute on chronic systolic heart failure [I50.23]  Yes    Pacemaker [Z95.0]  Yes    S/P TAVR (transcatheter aortic valve replacement) [Z95.2]  Not Applicable    COPD (chronic obstructive pulmonary disease) [J44.9]  Yes    Persistent atrial fibrillation [I48.19]  Yes    Essential hypertension [I10]  Yes    Type 2 diabetes mellitus [E11.9]  Yes     A1C 7.0        Resolved Hospital Problems    Diagnosis Date Resolved POA    Acute on chronic heart failure with preserved ejection fraction [I50.33] 02/12/2024 Yes           Consult Note  [facility]  Interventional Cardiology        Adriana Strong  YOB: 1945  Medical Record Number:  [id number]  Attending Physician:  Guillermina Kumar MD   Date of Admission: 2/9/2024         Hospital Day:  3  Current Principal Problem:  Acute on chronic systolic heart failure     HISTORY:      Ms. Strong is a 79 year old female with a PMH of coronary artery disease, " hyperlipidemia, hypertension, aortic stenosis status post TAVR (2019), diabetes, has CVA, HFpEF, atrial fibrillation, and a permanent pacemaker who presents as a direct admit from cardiology clinic due to SOB concerning for CHF exacerbation and suspected COPD exacerbation. She is presenting with her daughter who is present at bedside. Patient reports that she has increasing SOB on exertion in the past month or so. She reports that she takes 20 steps then feels like she needs to take a break. Patient reports that she also has chest pain at rest. Patient reports that she did not try to take her nitroglycerin because the pain comes and goes and its hard to pin point. Patient's  manages most of her medication. She reports that she has been compliant and has not missed any doses.      IC was consulted for new EF drop to 35-40% and abnormal PET stress test today showing TID.      Medications - Outpatient          Prior to Admission medications    Medication Sig Start Date End Date Taking? Authorizing Provider   acetaminophen (TYLENOL) 650 MG TbSR Take 1,300 mg by mouth 2 (two) times daily as needed.     Yes Provider, Historical   albuterol-ipratropium (DUO-NEB) 2.5 mg-0.5 mg/3 mL nebulizer solution Take 3 mLs by nebulization every 4 (four) hours as needed for Wheezing or Shortness of Breath. 10/26/20   Yes Provider, Historical   apixaban (ELIQUIS) 5 mg Tab TAKE 1 TABLET BY MOUTH TWICE A DAY 5/9/23   Yes Rancho Mariee MD   ascorbic acid, vitamin C, (VITAMIN C) 500 MG tablet Take 1,000 mg by mouth once daily.      Yes Provider, Historical   atorvastatin (LIPITOR) 10 MG tablet Take 1 tablet (10 mg total) by mouth once daily. 9/26/23 9/25/24 Yes HandLarry MD   calcium carbonate/vitamin D3 (CALTRATE 600 + D ORAL) Take 1 tablet by mouth once daily.     Yes Provider, Historical   carvediloL (COREG) 12.5 MG tablet Take 1 tablet (12.5 mg total) by mouth 2 (two) times daily. 4/5/23 4/4/24 Yes Rancho Mariee MD    clopidogreL (PLAVIX) 75 mg tablet TAKE 1 TABLET BY MOUTH EVERY DAY 9/5/23   Yes Oli Jeff MD   diltiaZEM (CARDIZEM CD) 240 MG 24 hr capsule TAKE 1 CAPSULE BY MOUTH ONCE A DAY 5/26/23   Yes Rancho Mariee MD   DULoxetine (CYMBALTA) 20 MG capsule Take 1 capsule (20 mg total) by mouth once daily. 9/26/23 9/25/24 Yes Larry Agee MD   empagliflozin (JARDIANCE) 10 mg tablet Take 1 tablet (10 mg total) by mouth once daily. 10/2/23 10/1/24 Yes Khloe Andujar PA-C   fexofenadine (ALLEGRA) 60 MG tablet Take 60 mg by mouth once daily.     Yes Provider, Historical   fish oil-omega-3 fatty acids 300-1,000 mg capsule Take 1 g by mouth once daily.      Yes Provider, Historical   furosemide (LASIX) 40 MG tablet Take 40mg (1 tablet) in the morning and 20mg (1/2 tablet) in the afternoon 10/27/23   Yes Khloe Andujar PA-C   gabapentin (NEURONTIN) 600 MG tablet Take 600 mg by mouth 3 (three) times daily.     Yes Provider, Historical   isosorbide mononitrate (IMDUR) 60 MG 24 hr tablet Take 1 tablet (60 mg total) by mouth once daily. 9/26/23 9/25/24 Yes Larry Agee MD   LORazepam (ATIVAN) 0.5 MG tablet Take 1 tablet (0.5 mg total) by mouth every morning. 8/12/22 2/11/24 Yes Ruddy Funes MD   losartan (COZAAR) 50 MG Tab Take 1 tablet (50 mg total) by mouth once daily. 12/18/23 12/17/24 Yes Nate Landis MD   metFORMIN (GLUCOPHAGE) 500 MG tablet Take 1 tablet (500 mg total) by mouth 2 (two) times daily. 9/26/23 9/25/24 Yes Larry Agee MD   montelukast (SINGULAIR) 10 mg tablet Take 10 mg by mouth once daily. 3/4/21   Yes Provider, Historical   mv-mn/folic ac/calcium/vit K1 (WOMEN'S 50 PLUS MULTIVITAMIN ORAL) Take 1 tablet by mouth once daily.     Yes Provider, Historical   nitroGLYCERIN (NITROSTAT) 0.4 MG SL tablet Place 1 tablet (0.4 mg total) under the tongue every 5 (five) minutes as needed for Chest pain. 8/12/22 2/11/24 Yes Ruddy Funes MD   pantoprazole (PROTONIX) 40 MG tablet Take 1 tablet (40 mg  total) by mouth once daily. 8/12/22   Yes Ruddy Funes MD   potassium gluconate 550 mg (90 mg) Tab Take 180 mg by mouth 2 (two) times a day.     Yes Provider, Historical   ranolazine (RANEXA) 1,000 mg Tb12 Take 1 tablet (1,000 mg total) by mouth 2 (two) times daily. 10/19/23 10/18/24 Yes Nate Landis MD   SYMBICORT 160-4.5 mcg/actuation HFAA Inhale 1 puff into the lungs 2 (two) times daily. 5/20/22     Provider, Historical            Medications - Current  Scheduled Meds:   apixaban  5 mg Oral BID    atorvastatin  10 mg Oral Daily    clopidogreL  75 mg Oral Daily    DULoxetine  20 mg Oral Daily    empagliflozin  10 mg Oral Daily    fluticasone furoate-vilanteroL  1 puff Inhalation Daily    furosemide (LASIX) injection  80 mg Intravenous BID    gabapentin  600 mg Oral TID    isosorbide mononitrate  60 mg Oral Daily    LORazepam  0.5 mg Oral QAM    [START ON 2/15/2024] losartan  50 mg Oral Daily    montelukast  10 mg Oral Daily    pantoprazole  40 mg Oral Daily    polyethylene glycol  17 g Oral BID    ranolazine  1,000 mg Oral BID    senna  8.6 mg Oral Nightly      Continuous Infusions:  PRN Meds:.acetaminophen, albuterol-ipratropium, dextrose 10%, dextrose 10%, glucagon (human recombinant), glucose, glucose, insulin aspart U-100, ondansetron, oxyCODONE, sodium chloride 0.9%        Allergies        Review of patient's allergies indicates:   Allergen Reactions    Celebrex [celecoxib] Shortness Of Breath    Ciprofloxacin Swelling       lip    Dicyclomine Hives    Adhesive Dermatitis    Avelox [moxifloxacin] Swelling    Cilostazol Other (See Comments)       Elevates blood pressure    Eryc [erythromycin] Other (See Comments)       unknown    Iodine and iodide containing products Hives    Keflex [cephalexin] Hives    Meclomen         rashes    Meloxicam         Ear ringing    Metoclopramide Other (See Comments)       High blood pressure    Sulfa (sulfonamide antibiotics) Itching            Past Medical History        Past Medical History:   Diagnosis Date    Acute on chronic diastolic (congestive) heart failure 11/20/2019    Anticoagulant long-term use       on Plavix since May 2015    Anxiety      Arthritis      Asthma      Atrial fibrillation      Atrial fibrillation with RVR 10/19/2020    Cataracts, bilateral      Chest pain, atypical      Chronic atrial fibrillation with rapid ventricular response 6/23/2017    Colon polyp      Coronary artery disease      Diabetes mellitus      Dry eyes      Esophageal erosions      GERD (gastroesophageal reflux disease)      Heart murmur      Hemorrhoid      High cholesterol      Hypertension      Irritable bowel syndrome      Paroxysmal atrial fibrillation 10/30/2020    Persistent atrial fibrillation 2/10/2020    Shingles 2015    Stenosis of aortic and mitral valves      Stroke      TIA (transient ischemic attack)      Use of cane as ambulatory aid              Past Surgical History        Past Surgical History:   Procedure Laterality Date    ABDOMINAL SURGERY         stents to SMA    angiocele         2007, 2014    AORTIC VALVE REPLACEMENT N/A 11/19/2019     Procedure: Replacement-valve-aortic;  Surgeon: Jack Capone MD;  Location: [facility] CATH LAB;  Service: Cardiology;  Laterality: N/A;    BREAST SURGERY         left--- a lump--- no cancer    CARDIAC VALVE SURGERY        COLONOSCOPY   2014    COLONOSCOPY N/A 3/14/2018     Procedure: COLONOSCOPY;  Surgeon: Efren Kemp MD;  Location: [facility] ENDO ([address] [place] [address]);  Service: Endoscopy;  Laterality: N/A;  ok to hold Plavix 5 days prior to procedure per Dr RESHMA Hernandez     ok per Dr Kemp to hold Eliquis 2 days prior to procedure (see telephone encounter dated-2/7/18)    HERNIA REPAIR        HYSTERECTOMY   partial     1982 partial hysterectomy    LEFT HEART CATHETERIZATION Right 11/5/2019     Procedure: Left heart cath;  Surgeon: Jack Capone MD;  Location: [facility] CATH LAB;  Service: Cardiology;  Laterality: Right;    left nasal polyp        left neck  lymph node        nose polyp        right hip fatty mass tissue        stent to small intestine        SUPERIOR VENA CAVA ANGIOPLASTY / STENTING        TONSILLECTOMY        TOTAL ABDOMINAL HYSTERECTOMY         2014    TOTAL KNEE ARTHROPLASTY Bilateral      TREATMENT OF CARDIAC ARRHYTHMIA N/A 2/10/2020     Procedure: CARDIOVERSION;  Surgeon: Jayy Parrish MD;  Location: [facility] EP LAB;  Service: Cardiology;  Laterality: N/A;  AF, PEDRO, DCCV, MAC, DM, 3 Prep    TREATMENT OF CARDIAC ARRHYTHMIA N/A 5/15/2020     Procedure: CARDIOVERSION;  Surgeon: Hany Bee MD;  Location: [facility] EP LAB;  Service: Cardiology;  Laterality: N/A;  AF, PEDRO, DCCV, MAC, DM, 3 Prep    UPPER GASTROINTESTINAL ENDOSCOPY   2014            Social History  Social History           Socioeconomic History    Marital status:    Tobacco Use    Smoking status: Never    Smokeless tobacco: Never   Substance and Sexual Activity    Alcohol use: No    Drug use: No    Sexual activity: Not Currently      Social Determinants of Health           Financial Resource Strain: Low Risk  (2/12/2024)     Overall Financial Resource Strain (CARDIA)      Difficulty of Paying Living Expenses: Not hard at all   Food Insecurity: No Food Insecurity (2/12/2024)     Hunger Vital Sign      Worried About Running Out of Food in the Last Year: Never true      Ran Out of Food in the Last Year: Never true   Transportation Needs: No Transportation Needs (2/12/2024)     PRAPARE - Transportation      Lack of Transportation (Medical): No      Lack of Transportation (Non-Medical): No   Physical Activity: Insufficiently Active (2/12/2024)     Exercise Vital Sign      Days of Exercise per Week: 4 days      Minutes of Exercise per Session: 20 min   Stress: Stress Concern Present (2/12/2024)     [employer] [place] [employer] - Occupational Stress Questionnaire      Feeling of Stress : To some extent   Social Connections: Moderately Integrated (2/12/2024)     Social  Connection and Isolation Panel [NHANES]      Frequency of Communication with Friends and Family: Twice a week      Frequency of Social Gatherings with Friends and Family: Once a week      Attends Oriental orthodox Services: 1 to 4 times per year      Active Member of Clubs or Organizations: No      Attends Club or Organization Meetings: Never      Marital Status:    Housing Stability: Low Risk  (2/12/2024)     Housing Stability Vital Sign      Unable to Pay for Housing in the Last Year: No      Number of Places Lived in the Last Year: 1      Unstable Housing in the Last Year: No            ROS:      Constitutional: Negative for chills, fever and malaise/fatigue.   HENT:  Negative for sore throat.    Eyes:  Negative for blurred vision and visual disturbance.   Cardiovascular:  Positive for chest pain, dyspnea on exertion, leg swelling, orthopnea and palpitations. Negative for syncope.   Respiratory:  Positive for cough and shortness of breath. Negative for hemoptysis and wheezing.    Gastrointestinal:  Negative for abdominal pain, constipation, diarrhea, nausea and vomiting.   Genitourinary:  Negative for dysuria and hematuria.   Psychiatric/Behavioral:  Negative for altered mental status, depression and hallucinations.          PHYSICAL EXAM:      Vital Signs  Vitals  Temp: 98.3 °F (36.8 °C)  Temp Source: Oral  Pulse: (!) 59  Heart Rate Source: Monitor  Resp: 19  SpO2: 95 %  Pulse Oximetry Type: Intermittent  Flow (L/min): 2  Oxygen Concentration (%): 36  BP: (!) 127/58  MAP (mmHg): 84  BP Location: Right arm  BP Method: Automatic  Patient Position: Lying          24 Hour VS Range     Temp:  [97.8 °F (36.6 °C)-98.3 °F (36.8 °C)]   Pulse:  [56-62]   Resp:  [14-20]   BP: (118-147)/(58-70)   SpO2:  [94 %-98 %]      Intake/Output Summary (Last 24 hours) at 2/14/2024 1538  Last data filed at 2/14/2024 0515      Gross per 24 hour   Intake 1050 ml   Output 600 ml   Net 450 ml          Constitutional:       Appearance:  "Normal appearance. She is obese.   HENT:      Head: Normocephalic and atraumatic.   Eyes:      Extraocular Movements: Extraocular movements intact.   Cardiovascular:      Rate and Rhythm: Normal rate and regular rhythm.      Pulses: Normal pulses.      Heart sounds: Normal heart sounds.   Abdominal:      General: There is no distension.      Palpations: Abdomen is soft.      Tenderness: There is no abdominal tenderness.   Musculoskeletal:         General: No leg edema anymore  Skin:     General: Skin is warm.      Capillary Refill: Capillary refill takes less than 2 seconds.   Neurological:      General: No focal deficit present.      Mental Status: She is alert and oriented to person, place, and time     DATA:               Recent Labs   Lab 02/09/24  1708 02/10/24  0502 02/13/24  0452   WBC 7.02 6.99 6.77   HGB 12.8 12.2 12.0   HCT 40.3 38.4 37.2    222 216         No results for input(s): "PROTIME", "INR" in the last 168 hours.              Recent Labs   Lab 02/11/24  0554 02/12/24  0553 02/13/24  0452 02/13/24  1539 02/14/24  0502    134* 134* 136 133*   K 3.6 3.5 3.5 3.9 3.5   CL 98 94* 93* 95 91*   CO2 33* 30* 34* 32* 33*   BUN 18 17 18 19 18   CREATININE 0.8 0.7 0.7 0.9 0.7   ANIONGAP 7* 10 7* 9 9   CALCIUM 9.0 9.0 8.9 9.0 9.0             Recent Labs   Lab 02/09/24 1708   PROT 7.4   ALBUMIN 3.7   BILITOT 0.3   ALKPHOS 44*   AST 31   ALT 14              Recent Labs   Lab 02/09/24  1708 02/13/24  1539   TROPONINI <0.006 0.011             BNP (pg/mL)   Date Value   02/09/2024 210 (H)   10/27/2023 251 (H)   10/17/2023 223 (H)   10/02/2023 219 (H)   09/21/2023 395 (H)         No results for input(s): "LABBLOO" in the last 168 hours.         ASSESSMENT/PLAN:      #New onset systolic dysfunction  #Abnormal PET stress test     -New EF drop to 35-40%, PET stress showing TID and abnormal perfusion   -Also has Afib and RV pacing burden of 100%, EP on board        Recommendations  -NPO midnight for LHC +/- " PCI tomorrow  -will prep with prednisone, pepcid and benadryl x 3 doses each q6h due to contrast allergy     - [facility] +/- PCI, patient is a NICK candidate  - Anti-platelet Therapy: Plavix (and Eliquis)  - Access: Right CFA due to TAVR   - Creatinine: 0.9  - Allergies: No shellfish / Iodine allergy  - Pre-Hydration: None   - Pre-Op Med: Bendaryl 50mg pO and Predniosone per protocol due to cont allergy  - All patient's questions were answered.  -The risks, benefits and alternatives of the procedure were explained to the patient.   -The risks of coronary angiography include but are not limited to: bleeding, infection, heart rhythm abnormalities, allergic reactions, kidney injury and potential need for dialysis, stroke and death.   - Should stenting be indicated, the patient has agreed to dual anti-platelet therapy for 1-consecutive year with a drug-eluting stent and a minimum of 1-month with the use of a bare metal stent  - Additionally, pt is aware that non-compliance is likely to result in stent clotting with heart attack, heart failure, and/or death  -The risks of moderate sedation include hypotension, respiratory depression, arrhythmias, bronchospasm, and death.   - Informed consent was obtained and the  patient is agreeable to proceed with the procedure.           Signed:  Blade Jose M.D. PGY-5  Cardiovascular Fellow  [facility]       I have seen the patient, reviewed the Fellow's history and physical, assessment and plan. I have personally interviewed and examined the patient and agree with the findings.     JOY Baptiste MD

## 2024-02-15 NOTE — PROGRESS NOTES
[facility] - Short Stay Cardiac Unit  [facility] Medicine  Progress Note    Patient Name: Adriana Strong  MRN: [id number]  Patient Class: IP- Inpatient   Admission Date: 2/9/2024  Length of Stay: 4 days  Attending Physician: Guillermina Kumar MD  Primary Care Provider: Krzysztof Mcgraw MD        Subjective:     Principal Problem:Acute on chronic systolic heart failure        HPI:  79F pmhx HLD, HTN, DM2, s/p TAVR (2019), CAD (pre TAVR 70% iLAD lesion), HFpEF, atrial fibrillation, PPM, COPD previously on home 2L home O2 but seems to not recently been using who presents to the ED from Cardiology clinic for concern of CHF exacerbation and worsening WHITLEY in setting of COPD. Patient reports she cannot walk across the room without getting SOB. Reports increasing bilateral leg swelling. Needs several pillows and 45 degree incline to sleep. Wheezing and using duonebs prn a couple times a week. Also reports chest tightness that occurs 1-5 minutes at a time, at random, unassociated with exercise nor eating, denies heartburn. Denies palpitations, lightheadedness. Patient reports compliance with diuretics and cardiac meds (lasix 40mg am, 20mg pm). Does not add salt to diet nor eats fried foods.    In the ED patient afebrile and hemodynamically stable saturating well on 2L NC.  and significant bilateral pitting edema. CXR without overt acute process. Patient with bilateral rales and slight wheezing. Started on lasix iv and given xopenex x1. Admitted to the care of medicine for further evaluation and management.     Overview/Hospital Course:  Pt sent to ED from cardio to be admitted for acute on chronic HF and exercise intolerance , started on iv lasix and O2 per protocol. Echo 2/10: Regional wall motion abnormalities present. There is moderately reduced systolic function with a visually estimated ejection fraction of 35 - 40%. Consulted cards. Patient started on GDMT. Rec consulting EP. 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  Changed pacemaker setting. Rec stopping AVN blockers and GDMT. Patient has PET stress test on 2/14. IC consulted for transient ischemic dilation on stress test and concern for triple vessel disease.      Interval History: Patient OFT for LHC. Resume diet post procedure         Objective:     Vital Signs (Most Recent):  Temp: 98.3 °F (36.8 °C) (02/14/24 1131)  Pulse: 60 (02/14/24 1131)  Resp: 19 (02/14/24 1131)  BP: (!) 127/58 (02/14/24 1131)  SpO2: 95 % (02/14/24 1131) Vital Signs (24h Range):  Temp:  [96.7 °F (35.9 °C)-98.3 °F (36.8 °C)] 98.3 °F (36.8 °C)  Pulse:  [56-62] 60  Resp:  [14-20] 19  SpO2:  [94 %-98 %] 95 %  BP: (118-158)/(58-70) 127/58     Weight: 102.5 kg (226 lb)  Body mass index is 38.79 kg/m².    Intake/Output Summary (Last 24 hours) at 2/14/2024 1312  Last data filed at 2/14/2024 0515  Gross per 24 hour   Intake 1050 ml   Output 600 ml   Net 450 ml                Significant Labs: All pertinent labs within the past 24 hours have been reviewed.    Significant Imaging: I have reviewed all pertinent imaging results/findings within the past 24 hours.    Assessment/Plan:      * Acute on chronic systolic heart failure  - IP CHF Pathway initiated  - Diuresing with lasix 80mg iv BID  - TTE 2/24: Regional wall motion abnormalities present. There is moderately reduced systolic function with a visually estimated ejection fraction of 35 - 40%   - Patient noted to have reduced EF compared to 55-60% on TTE obtained on 9/23.  - Consulted cardiology. Started on GDMT. Consulted EF. 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 Stopping AVN blockers and GDMT.   - Device interrogation ordered   - PET stress test obtained on 2/14. Consulting IC for transient ischemic dilation on stress test and concern for triple vessel disease.    - S/p LHC on 2/15    Pacemaker  EP consulted for concern for pacing induced HF. Recommend   - Stop beta blockers and avoid AV izabella blockers  - EP Changed mode of PPM from DDD-CLS 70bpm to VVI 60bpm  -  No more interventions from EP standpoint  - Follow up as outpatient with Dr. Moeller in 3-4 weeks.      S/P TAVR (transcatheter aortic valve replacement)  - continue home meds      COPD (chronic obstructive pulmonary disease)  - current symptoms felt more secondary to CHF over COPD exacerbation. Though potentially progressive chronic COPD changes without acute exacerbation may also be etiology  - previously on home 2L O2 after 6min walk test  - currently saturating well on 4L O2  - xopenex x1 in ED  - Duonebs q4h prn  - continue home LABA-ICS and symbicort    Persistent atrial fibrillation  - continue home Eliquis  - continue home coreg and diltiazem    Essential hypertension  - continue home meds coreg, losartan, diltiazem    Type 2 diabetes mellitus  - SSI  - hypoglycemic protocol      VTE Risk Mitigation (From admission, onward)           Ordered     heparin infusion 1,000 units/500 ml in 0.9% NaCl (on sterile field)  As needed (PRN)         02/15/24 0836                    Discharge Planning   MIMI: 2/15/2024     Code Status: Full Code   Is the patient medically ready for discharge?: No    Reason for patient still in hospital (select all that apply): Patient trending condition  Discharge Plan A: Home with family                  Guillermina Kumar MD  Department of Hospital Medicine   [facility] - Short Stay Cardiac Unit

## 2024-02-15 NOTE — NURSING
Shift Note    Pt slept well this shift. VSS. Pain managed with PRN's. NPO at midnight for angiogram today. Skin care completed. T&RQ2 as tolerated. Safety precautions in place. Call light in reach. No further concerns noted at this time.

## 2024-02-15 NOTE — PLAN OF CARE
Received report from Bel acharya RN. Patient s/p LHC via right groin CFA, AAOx3. VSS, no c/o pain or discomfort at this time, resp even and unlabored on 2LNC. Vaseline gauze/tegaderm dressing to right groin is intact. Right groin has minor irritation prior to heart cath with blood tinged vaseline gauze. No active bleeding. No hematoma noted. Post procedure protocol reviewed with patient and patient's family. Understanding verbalized. Family members updated via MD. Nurse call bell within reach.  Monitoring per post procedure protocol. Multiple bruises to upper extremities noted. Large hematoma to left forearm, cath lab RN Barry verified this is due to unsuccessful IV stick. No bleeding or pain, soft to touch. IV NS at 100ml/hr and vanc hr 250ml/hr, slowed to 167 ml/hr.  Bedrest in progress until 1400.  @1015 Minimal ooze noted to right groin, Manual pressure x 5 minutes and areas marked on dressing for close monitoring. Damon CORLEY notified.   @1025 Ooze increased over markings on dressing. Manual pressure in progress.  @1027 Damon at bedside and requested manual pressure for 10 minutes.  @1040 Manual pressure complete and dressing marked. Family at bedside.

## 2024-02-15 NOTE — ASSESSMENT & PLAN NOTE
"Patient is presenting from cardiology clinic for suspected CHF exacerbation and underlying COPD exacerbation as well. Patient reports that her symptoms began 1 month ago. She reports no inciting factor and reports progressive onset. Patient has WHITLEY and a dry cough. Patient reports that she has been adherent to her medication regimen and has no problems with it.     Patient troponin's wnl, BNP is 210 near baseline. Reviewed EKG which reveals ventricular paced rhythm. Patient's last echo on 2/9/24 revealed newly reduced ejection fraction at 35%. Patient has history of permanent pacemaker placement in 2022. And she has a history of TAVR in 2019. PET stress in 2023 revealed "There is a very small (< 5%) sized, mild intensity mid septal and anteroseptal reversible perfusion abnormality involving 3% of LV myocardium in the distribution of the LAD"    Patient is RV paced. Upon review of EKG it appears she is BV paced. But the CXR does not reveal CS leads in light of these findings. We suspect there to be a pacemaker induced heart failure component and would like to involve EP in light of these findings and possibility of upgrading leads. EP evaluated patient and switched her PPM settings to 60 VVI. Plan to re-evaluate patient in 3-4 weeks in clinic. They also recommended stopping AV izabella blocking agents in setting of pace maker induced HF. GDMT also unlikely to provide benefit in setting of pacemaker induced HF so ok to stop medications.   Echo    Interpretation Summary    Left Ventricle: The left ventricle is normal in size. Normal wall thickness. There is concentric remodeling. Regional wall motion abnormalities present. There is moderately reduced systolic function with a visually estimated ejection fraction of 35 - 40%. Ejection fraction by visual approximation is 35%. There is normal diastolic function.    Right Ventricle: Normal right ventricular cavity size. Wall thickness is normal. Right ventricle wall motion  is " "normal. Systolic function is normal. Pacemaker lead present in the ventricle.    Left Atrium: Left atrium is moderately dilated.    Right Atrium: Right atrium is mildly dilated. Lead present in the right atrium.    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 Aortic valve area by VTI is 1.03 cm². Aortic valve peak velocity is 2.07 m/s. Mean gradient is 11 mmHg. The dimensionless index is 0.30. There is trace aortic regurgitation.    Mitral Valve: There is mild mitral annular calcification present.    Tricuspid Valve: There is mild regurgitation.    Pulmonary Artery: There is mild to moderate pulmonary hypertension. The estimated pulmonary artery systolic pressure is 47 mmHg.    IVC/SVC: Normal venous pressure at 3 mmHg.       PET Stress revealed:    "The stress flow is severely reduced diffusely throughout the heart consistent with likely three vessel balanced ischemia. The LV cavity appears to dilate during stress. The gated perfusion images showed an ejection fraction of 61% at rest and 65% during stress. A normal ejection fraction is greater than 47%." Please refer to study for further interpretation.     LHC on 2/15/24 revealed non obstructive CAD. Plan for post cath protocol.     Recommendations:   - Stopped Diltiazem in setting of newly found reduced EF  - In the setting of pacemaker induced HF, ok to stop GDMT as this will not provide any benefit as EP recommended  - DC patients BB as this can slow AV node conduction   - Follow up in EP clinic in 3-4 weeks to have evaluation/need for upgrading pacemaker to CRT-P  - Follow up with post cath protocols, and follow up in cardiology clinic     We will sign off, please reach out with any questions. Thank you for your consult     "

## 2024-02-15 NOTE — NURSING
@1055 Saturation over lines on dressing. Removed and trickle ooze to right groin. Damon CORLEY notified OK to place quikclot gauze to area. Covered with gauze and tegaderm. Right groin chaffing remains unchanged.

## 2024-02-15 NOTE — PROCEDURES
Brief Operative Note:    : Jos Baptiste MD     Referring Physician: Self,Aaareferral     All Operators: Surgeon(s):  Jos Baptiste MD Davis, Arthur P, MD Shahjehan, Blade ODELL MD     Preoperative Diagnosis: Systolic dysfunction [I51.9]  Abnormal stress test [R94.39]     Postop Diagnosis: Systolic dysfunction [I51.9]  Abnormal stress test [R94.39]    Treatments/Procedures: Procedure(s) (LRB):  ANGIOGRAM, CORONARY ARTERY (N/A)    Access: R CFA     Findings:Non obstructive CAD     See catheterization report for full details.    Intervention: None    See catheterization report for full details.    Closure device: Manual pressure x 12 min for 4 Fr sheath      Plan:  - Transfer back to the hospital floor for ongoing care  - Post cath protocol   - IVF @ 100 cc/hr x 4 hours (50 cc contrast used, euvolemic now)  - Bed rest x 4 hours   - Continue plavix indefinitely  - Continue high intensity statin therapy (LDL goal < 70)  - Risk factor reduction (BP <130/80 mmHg, glycemic control, etc)  - Follow up with outpatient cardiologist    Estimated Blood loss: 20 cc    Specimens removed: No    Blade Jose MD  Interventional Cardiology PGY-5  02/15/2024

## 2024-02-15 NOTE — PROGRESS NOTES
[facility] - Short Stay Cardiac Unit  Cardiology  Progress Note    Patient Name: Adriana Strong  MRN: [id number]  Admission Date: 2/9/2024  Hospital Length of Stay: 4 days  Code Status: Full Code   Attending Physician: Guillermina Kumar MD   Primary Care Physician: Krzysztof Mcgraw MD  Expected Discharge Date: 2/15/2024  Principal Problem:Acute on chronic systolic heart failure    Subjective:     Hospital Course:   No notes on file    Interval History:   Patient had LHC done today which revealed non obstructive CAD. Patient will be on post cath protocol. Patient produced 1.3 L of urine in 24 hour period. Patient net negative 7.6 L.    Review of Systems   Constitutional: Negative for chills, fever and malaise/fatigue.   HENT:  Negative for sore throat.    Eyes:  Negative for blurred vision and visual disturbance.   Cardiovascular:  Positive for chest pain, dyspnea on exertion, leg swelling, orthopnea and palpitations. Negative for syncope.   Respiratory:  Positive for cough and shortness of breath. Negative for hemoptysis and wheezing.    Gastrointestinal:  Negative for abdominal pain, constipation, diarrhea, nausea and vomiting.   Genitourinary:  Negative for dysuria and hematuria.   Psychiatric/Behavioral:  Negative for altered mental status, depression and hallucinations.      Objective:     Vital Signs (Most Recent):  Temp: 97.3 °F (36.3 °C) (02/15/24 1005)  Pulse: 60 (02/15/24 1100)  Resp: 17 (02/15/24 1100)  BP: (!) 124/58 (02/15/24 1100)  SpO2: 98 % (02/15/24 1005) Vital Signs (24h Range):  Temp:  [97.3 °F (36.3 °C)-98.6 °F (37 °C)] 97.3 °F (36.3 °C)  Pulse:  [59-62] 60  Resp:  [16-20] 17  SpO2:  [91 %-99 %] 98 %  BP: (124-163)/(55-73) 124/58     Weight: 102.5 kg (226 lb)  Body mass index is 38.79 kg/m².     SpO2: 98 %         Intake/Output Summary (Last 24 hours) at 2/15/2024 1124  Last data filed at 2/15/2024 0631  Gross per 24 hour   Intake 600 ml   Output 950 ml   Net -350 ml         Lines/Drains/Airways        Drain  Duration             Female External Urinary Catheter w/ Suction 02/09/24 2024 5 days              Peripheral Intravenous Line  Duration                  Peripheral IV - Single Lumen 02/09/24 1706 22 G Posterior;Right Hand 5 days         Peripheral IV - Single Lumen 02/15/24 0848 22 G Anterior;Right Upper Arm <1 day                       Physical Exam  Constitutional:       Appearance: Normal appearance. She is obese.   HENT:      Head: Normocephalic and atraumatic.   Eyes:      Extraocular Movements: Extraocular movements intact.   Cardiovascular:      Rate and Rhythm: Normal rate and regular rhythm.      Pulses: Normal pulses.      Heart sounds: Normal heart sounds.   Pulmonary:      Breath sounds: Wheezing present.   Abdominal:      General: There is no distension.      Palpations: Abdomen is soft.      Tenderness: There is no abdominal tenderness.   Musculoskeletal:         General: Swelling present.   Skin:     General: Skin is warm.      Capillary Refill: Capillary refill takes less than 2 seconds.   Neurological:      General: No focal deficit present.      Mental Status: She is alert and oriented to person, place, and time.            Significant Labs: All pertinent lab results from the last 24 hours have been reviewed.      Assessment and Plan:         * Acute on chronic systolic heart failure  Patient is presenting from cardiology clinic for suspected CHF exacerbation and underlying COPD exacerbation as well. Patient reports that her symptoms began 1 month ago. She reports no inciting factor and reports progressive onset. Patient has WHITLEY and a dry cough. Patient reports that she has been adherent to her medication regimen and has no problems with it.     Patient troponin's wnl, BNP is 210 near baseline. Reviewed EKG which reveals ventricular paced rhythm. Patient's last echo on 2/9/24 revealed newly reduced ejection fraction at 35%. Patient has history of permanent pacemaker placement in 2022. And  "she has a history of TAVR in 2019. PET stress in 2023 revealed "There is a very small (< 5%) sized, mild intensity mid septal and anteroseptal reversible perfusion abnormality involving 3% of LV myocardium in the distribution of the LAD"    Patient is RV paced. Upon review of EKG it appears she is BV paced. But the CXR does not reveal CS leads in light of these findings. We suspect there to be a pacemaker induced heart failure component and would like to involve EP in light of these findings and possibility of upgrading leads. EP evaluated patient and switched her PPM settings to 60 VVI. Plan to re-evaluate patient in 3-4 weeks in clinic. They also recommended stopping AV izabella blocking agents in setting of pace maker induced HF. GDMT also unlikely to provide benefit in setting of pacemaker induced HF so ok to stop medications.   Echo    Interpretation Summary    Left Ventricle: The left ventricle is normal in size. Normal wall thickness. There is concentric remodeling. Regional wall motion abnormalities present. There is moderately reduced systolic function with a visually estimated ejection fraction of 35 - 40%. Ejection fraction by visual approximation is 35%. There is normal diastolic function.    Right Ventricle: Normal right ventricular cavity size. Wall thickness is normal. Right ventricle wall motion  is normal. Systolic function is normal. Pacemaker lead present in the ventricle.    Left Atrium: Left atrium is moderately dilated.    Right Atrium: Right atrium is mildly dilated. Lead present in the right atrium.    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 Aortic valve area by VTI is 1.03 cm². Aortic valve peak velocity is 2.07 m/s. Mean gradient is 11 mmHg. The dimensionless index is 0.30. There is trace aortic regurgitation.    Mitral Valve: There is mild mitral annular calcification present.    Tricuspid Valve: There is mild regurgitation.    Pulmonary Artery: There " "is mild to moderate pulmonary hypertension. The estimated pulmonary artery systolic pressure is 47 mmHg.    IVC/SVC: Normal venous pressure at 3 mmHg.       PET Stress revealed:    "The stress flow is severely reduced diffusely throughout the heart consistent with likely three vessel balanced ischemia. The LV cavity appears to dilate during stress. The gated perfusion images showed an ejection fraction of 61% at rest and 65% during stress. A normal ejection fraction is greater than 47%." Please refer to study for further interpretation.     LHC on 2/15/24 revealed non obstructive CAD. Plan for post cath protocol.     Recommendations:   - Stopped Diltiazem in setting of newly found reduced EF  - In the setting of pacemaker induced HF, ok to stop GDMT as this will not provide any benefit as EP recommended  - DC patients BB as this can slow AV node conduction   - Follow up in EP clinic in 3-4 weeks to have evaluation/need for upgrading pacemaker to CRT-P  - Follow up with post cath protocols, and follow up in cardiology clinic     We will sign off, please reach out with any questions. Thank you for your consult           VTE Risk Mitigation (From admission, onward)           Ordered     heparin infusion 1,000 units/500 ml in 0.9% NaCl (on sterile field)  As needed (PRN)         02/15/24 0881                    Vinh Monroy,   Cardiology  [facility] - Short Stay Cardiac Unit    "

## 2024-02-15 NOTE — PLAN OF CARE
02/15/24 1533   Discharge Reassessment   Assessment Type Discharge Planning Reassessment   Did the patient's condition or plan change since previous assessment? No   Discharge Plan discussed with: Spouse/sig other   Name(s) and Number(s) Anthony Strong (Spouse) [phone] (Mobile   Communicated MIMI with patient/caregiver Yes   Discharge Plan A Home with family   Discharge Plan B Home   DME Needed Upon Discharge  other (see comments)  (TBD)   Transition of Care Barriers None   Post-Acute Status   Post-Acute Authorization Other   Coverage [facility] [email or web address] -   Other Status No Post-Acute Service Needs   Hospital Resources/Appts/Education Provided Appointments scheduled and added to AVS   Discharge Delays None known at this time     CM spoke  with patients spouse via phone to discuss any changes in discharge planning.  Patients plan is to  dc home with spouse.  No changes in DC plans. MIMI:         Caroline Perez RN  Case Management  [facility]  [phone]

## 2024-02-16 VITALS
TEMPERATURE: 98 F | HEART RATE: 59 BPM | BODY MASS INDEX: 38.5 KG/M2 | SYSTOLIC BLOOD PRESSURE: 135 MMHG | WEIGHT: 225.5 LBS | HEIGHT: 64 IN | DIASTOLIC BLOOD PRESSURE: 58 MMHG | RESPIRATION RATE: 18 BRPM | OXYGEN SATURATION: 100 %

## 2024-02-16 LAB
ANION GAP SERPL CALC-SCNC: 8 MMOL/L (ref 8–16)
BASOPHILS # BLD AUTO: 0.02 K/UL (ref 0–0.2)
BASOPHILS NFR BLD: 0.3 % (ref 0–1.9)
BUN SERPL-MCNC: 31 MG/DL (ref 8–23)
CALCIUM SERPL-MCNC: 9.1 MG/DL (ref 8.7–10.5)
CHLORIDE SERPL-SCNC: 99 MMOL/L (ref 95–110)
CO2 SERPL-SCNC: 33 MMOL/L (ref 23–29)
CREAT SERPL-MCNC: 0.8 MG/DL (ref 0.5–1.4)
DIFFERENTIAL METHOD BLD: ABNORMAL
EOSINOPHIL # BLD AUTO: 0 K/UL (ref 0–0.5)
EOSINOPHIL NFR BLD: 0 % (ref 0–8)
ERYTHROCYTE [DISTWIDTH] IN BLOOD BY AUTOMATED COUNT: 15.4 % (ref 11.5–14.5)
EST. GFR  (NO RACE VARIABLE): >60 ML/MIN/1.73 M^2
GLUCOSE SERPL-MCNC: 108 MG/DL (ref 70–110)
HCT VFR BLD AUTO: 35.6 % (ref 37–48.5)
HGB BLD-MCNC: 12 G/DL (ref 12–16)
IMM GRANULOCYTES # BLD AUTO: 0.02 K/UL (ref 0–0.04)
IMM GRANULOCYTES NFR BLD AUTO: 0.3 % (ref 0–0.5)
LYMPHOCYTES # BLD AUTO: 1.5 K/UL (ref 1–4.8)
LYMPHOCYTES NFR BLD: 19.7 % (ref 18–48)
MCH RBC QN AUTO: 31.5 PG (ref 27–31)
MCHC RBC AUTO-ENTMCNC: 33.7 G/DL (ref 32–36)
MCV RBC AUTO: 93 FL (ref 82–98)
MONOCYTES # BLD AUTO: 0.7 K/UL (ref 0.3–1)
MONOCYTES NFR BLD: 9.6 % (ref 4–15)
NEUTROPHILS # BLD AUTO: 5.4 K/UL (ref 1.8–7.7)
NEUTROPHILS NFR BLD: 70.1 % (ref 38–73)
NRBC BLD-RTO: 0 /100 WBC
PLATELET # BLD AUTO: 236 K/UL (ref 150–450)
PMV BLD AUTO: 10.8 FL (ref 9.2–12.9)
POCT GLUCOSE: 112 MG/DL (ref 70–110)
POTASSIUM SERPL-SCNC: 3.4 MMOL/L (ref 3.5–5.1)
RBC # BLD AUTO: 3.81 M/UL (ref 4–5.4)
SODIUM SERPL-SCNC: 140 MMOL/L (ref 136–145)
WBC # BLD AUTO: 7.7 K/UL (ref 3.9–12.7)

## 2024-02-16 PROCEDURE — 36415 COLL VENOUS BLD VENIPUNCTURE: CPT | Performed by: FAMILY MEDICINE

## 2024-02-16 PROCEDURE — 94640 AIRWAY INHALATION TREATMENT: CPT

## 2024-02-16 PROCEDURE — 99900035 HC TECH TIME PER 15 MIN (STAT)

## 2024-02-16 PROCEDURE — 63600175 PHARM REV CODE 636 W HCPCS: Performed by: FAMILY MEDICINE

## 2024-02-16 PROCEDURE — 85025 COMPLETE CBC W/AUTO DIFF WBC: CPT | Performed by: STUDENT IN AN ORGANIZED HEALTH CARE EDUCATION/TRAINING PROGRAM

## 2024-02-16 PROCEDURE — 25000003 PHARM REV CODE 250: Performed by: HOSPITALIST

## 2024-02-16 PROCEDURE — 25000003 PHARM REV CODE 250: Performed by: STUDENT IN AN ORGANIZED HEALTH CARE EDUCATION/TRAINING PROGRAM

## 2024-02-16 PROCEDURE — 94761 N-INVAS EAR/PLS OXIMETRY MLT: CPT

## 2024-02-16 PROCEDURE — 25000003 PHARM REV CODE 250: Performed by: FAMILY MEDICINE

## 2024-02-16 PROCEDURE — 80048 BASIC METABOLIC PNL TOTAL CA: CPT | Performed by: FAMILY MEDICINE

## 2024-02-16 PROCEDURE — 25000242 PHARM REV CODE 250 ALT 637 W/ HCPCS: Performed by: STUDENT IN AN ORGANIZED HEALTH CARE EDUCATION/TRAINING PROGRAM

## 2024-02-16 RX ORDER — POTASSIUM CHLORIDE 20 MEQ/1
40 TABLET, EXTENDED RELEASE ORAL ONCE
Status: COMPLETED | OUTPATIENT
Start: 2024-02-16 | End: 2024-02-16

## 2024-02-16 RX ADMIN — POLYETHYLENE GLYCOL 3350 17 G: 17 POWDER, FOR SOLUTION ORAL at 09:02

## 2024-02-16 RX ADMIN — EMPAGLIFLOZIN 10 MG: 10 TABLET, FILM COATED ORAL at 09:02

## 2024-02-16 RX ADMIN — APIXABAN 5 MG: 5 TABLET, FILM COATED ORAL at 09:02

## 2024-02-16 RX ADMIN — LORAZEPAM 0.5 MG: 0.5 TABLET ORAL at 09:02

## 2024-02-16 RX ADMIN — RANOLAZINE 1000 MG: 1000 TABLET, FILM COATED, EXTENDED RELEASE ORAL at 09:02

## 2024-02-16 RX ADMIN — FUROSEMIDE 80 MG: 10 INJECTION, SOLUTION INTRAVENOUS at 09:02

## 2024-02-16 RX ADMIN — LOSARTAN POTASSIUM 50 MG: 50 TABLET, FILM COATED ORAL at 09:02

## 2024-02-16 RX ADMIN — DULOXETINE HYDROCHLORIDE 20 MG: 20 CAPSULE, DELAYED RELEASE ORAL at 09:02

## 2024-02-16 RX ADMIN — CLOPIDOGREL BISULFATE 75 MG: 75 TABLET ORAL at 09:02

## 2024-02-16 RX ADMIN — ATORVASTATIN CALCIUM 10 MG: 10 TABLET, FILM COATED ORAL at 09:02

## 2024-02-16 RX ADMIN — MONTELUKAST 10 MG: 10 TABLET, FILM COATED ORAL at 09:02

## 2024-02-16 RX ADMIN — ISOSORBIDE MONONITRATE 60 MG: 30 TABLET, EXTENDED RELEASE ORAL at 09:02

## 2024-02-16 RX ADMIN — FLUTICASONE FUROATE AND VILANTEROL TRIFENATATE 1 PUFF: 100; 25 POWDER RESPIRATORY (INHALATION) at 08:02

## 2024-02-16 RX ADMIN — PANTOPRAZOLE SODIUM 40 MG: 40 TABLET, DELAYED RELEASE ORAL at 09:02

## 2024-02-16 RX ADMIN — GABAPENTIN 600 MG: 300 CAPSULE ORAL at 09:02

## 2024-02-16 RX ADMIN — POTASSIUM CHLORIDE 40 MEQ: 1500 TABLET, EXTENDED RELEASE ORAL at 09:02

## 2024-02-16 NOTE — NURSING
Discharge  Note :- Patient is AAO*4  .  Patient was  wheeled in wheelchair out of unit . Discharge paper  given and care explained .IV  removed . Patient had home oxygen on at 2 liter via  NC .

## 2024-02-16 NOTE — PLAN OF CARE
Patient in bed resting/watching television. VSS. No complaints of pain or discomfort. Last bm 2/15. Angio insertion sight without bleeding. Daughter at bedside.       Problem: Adult Inpatient Plan of Care  Goal: Plan of Care Review  Outcome: Ongoing, Progressing     Problem: Adult Inpatient Plan of Care  Goal: Patient-Specific Goal (Individualized)  Outcome: Ongoing, Progressing     Problem: Adult Inpatient Plan of Care  Goal: Absence of Hospital-Acquired Illness or Injury  Outcome: Ongoing, Progressing     Problem: Adult Inpatient Plan of Care  Goal: Optimal Comfort and Wellbeing  Outcome: Ongoing, Progressing

## 2024-02-16 NOTE — DISCHARGE SUMMARY
[facility] - Stepdown Flex ([place] [address])  [facility] Medicine  Discharge Summary      Patient Name: Adriana Strong  MRN: [id number]  LENNOX: [id number]  Patient Class: IP- Inpatient  Admission Date: 2/9/2024  Hospital Length of Stay: 5 days  Discharge Date and Time:  02/16/2024 12:57 PM  Attending Physician: Guillermina Kumar MD   Discharging Provider: Guillermina Kumar MD  Primary Care Provider: Krzysztof Mcgraw MD  [facility] Medicine Team: [facility] Guillermina Kumar MD  Primary Care Team: [facility]    HPI:   79F pmhx HLD, HTN, DM2, s/p TAVR (2019), CAD (pre TAVR 70% iLAD lesion), HFpEF, atrial fibrillation, PPM, COPD previously on home 2L home O2 but seems to not recently been using who presents to the ED from Cardiology clinic for concern of CHF exacerbation and worsening WHITLEY in setting of COPD. Patient reports she cannot walk across the room without getting SOB. Reports increasing bilateral leg swelling. Needs several pillows and 45 degree incline to sleep. Wheezing and using duonebs prn a couple times a week. Also reports chest tightness that occurs 1-5 minutes at a time, at random, unassociated with exercise nor eating, denies heartburn. Denies palpitations, lightheadedness. Patient reports compliance with diuretics and cardiac meds (lasix 40mg am, 20mg pm). Does not add salt to diet nor eats fried foods.    In the ED patient afebrile and hemodynamically stable saturating well on 2L NC.  and significant bilateral pitting edema. CXR without overt acute process. Patient with bilateral rales and slight wheezing. Started on lasix iv and given xopenex x1. Admitted to the care of medicine for further evaluation and management.     Procedure(s) (LRB):  ANGIOGRAM, CORONARY ARTERY (N/A)      Hospital Course:   Pt sent to ED from cardio to be admitted for acute on chronic HF and exercise intolerance , started on iv lasix and O2 per protocol. Echo 2/10: Regional wall motion abnormalities present. There is moderately  reduced systolic function with a visually estimated ejection fraction of 35 - 40%. Consulted cards. Patient started on GDMT. Rec consulting EP. 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 Changed pacemaker setting. Rec stopping AVN blockers and GDMT since reduced EF seems to be sec to pacemaker induced  HF. Patient has PET stress test on 2/14. IC consulted for transient ischemic dilation on stress test and concern for triple vessel disease. S/p LHC, found to have Non-obstructive CAD. No stents placed. Patient deemed appropriate for d/c. Advised to follow up with EP (Dr. Moeller) in 3-4 weeks. Patient is currently medically and HDS. She is being d/c home. FU with PCP and EP. ER precautions given .Plan of care discussed with patient and family at bedside, verbalized understanding. All questions were answered.          Goals of Care Treatment Preferences:  Code Status: Full Code    Living Will: Yes              Consults:   Consults (From admission, onward)          Status Ordering Provider     Inpatient consult to Interventional Cardiology  Once        Provider:  (Not yet assigned)    Completed DAVON MCCORD     Inpatient consult to Electrophysiology  Once        Provider:  (Not yet assigned)    Completed DAHIANA BURTON     Inpatient consult to Cardiology  Once        Provider:  (Not yet assigned)    Completed DAVON MCCORD     Inpatient consult to Social Work/Case Management  Once        Provider:  (Not yet assigned)    Acknowledged HERMES ERNANDEZ     Inpatient consult to Registered Dietitian/Nutritionist  Once        Provider:  (Not yet assigned)    Completed HERMES ERNANDEZ            No new Assessment & Plan notes have been filed under this hospital service since the last note was generated.  Service: Hospital Medicine    Final Active Diagnoses:    Diagnosis Date Noted POA    PRINCIPAL PROBLEM:  Acute on chronic systolic heart failure [I50.23] 02/12/2024 Yes    Pacemaker [Z95.0] 07/03/2023 Yes    S/P TAVR  (transcatheter aortic valve replacement) [Z95.2] 11/19/2019 Not Applicable    COPD (chronic obstructive pulmonary disease) [J44.9] 06/13/2017 Yes    Persistent atrial fibrillation [I48.19] 07/08/2016 Yes    Essential hypertension [I10] 04/08/2016 Yes    Type 2 diabetes mellitus [E11.9] 11/24/2014 Yes      Problems Resolved During this Admission:    Diagnosis Date Noted Date Resolved POA    Acute on chronic heart failure with preserved ejection fraction [I50.33] 09/30/2022 02/12/2024 Yes       Discharged Condition: stable    Disposition: Home or Self Care    Follow Up:   Follow-up Information       Chencho Moeller MD Follow up in 4 week(s).    Specialties: Electrophysiology, Cardiology  Contact information:  [address] [facility] [address]  [phone]               Krzysztof Mcgraw MD Follow up in 3 day(s).    Specialty: Family Medicine  Contact information:  [address] [facility]  [address]  [facility] [facility] [place] [address]  [phone]                           Patient Instructions:      Ambulatory referral/consult to Cardiac Electrophysiology   Standing Status: Future   Referral Priority: Routine Referral Type: Consultation   Referral Reason: Specialty Services Required   Requested Specialty: Cardiology   Number of Visits Requested: 1     Ambulatory referral/consult to Heart Failure Transitional Care Clinic   Standing Status: Future   Referral Priority: Routine Referral Type: Consultation   Referral Reason: Specialty Services Required   Requested Specialty: Cardiology   Number of Visits Requested: 1     Call MD for:  temperature >100.4     Call MD for:  persistent nausea and vomiting or diarrhea     Call MD for:  severe uncontrolled pain     Call MD for:  redness, tenderness, or signs of infection (pain, swelling, redness, odor or green/yellow discharge around incision site)     Call MD for:  difficulty breathing or increased cough     Call MD for:  severe persistent headache     Call MD for:  worsening rash     Call MD  for:  persistent dizziness, light-headedness, or visual disturbances     Call MD for:  increased confusion or weakness       Significant Diagnostic Studies: N/A    Pending Diagnostic Studies:       None           Medications:  Reconciled Home Medications:      Medication List        CONTINUE taking these medications      acetaminophen 650 MG Tbsr  Commonly known as: TYLENOL  Take 1,300 mg by mouth 2 (two) times daily as needed.     albuterol-ipratropium 2.5 mg-0.5 mg/3 mL nebulizer solution  Commonly known as: DUO-NEB  Take 3 mLs by nebulization every 4 (four) hours as needed for Wheezing or Shortness of Breath.     ascorbic acid (vitamin C) 500 MG tablet  Commonly known as: VITAMIN C  Take 1,000 mg by mouth once daily.     atorvastatin 10 MG tablet  Commonly known as: LIPITOR  Take 1 tablet (10 mg total) by mouth once daily.     CALTRATE 600 + D ORAL  Take 1 tablet by mouth once daily.     clopidogreL 75 mg tablet  Commonly known as: PLAVIX  TAKE 1 TABLET BY MOUTH EVERY DAY     DULoxetine 20 MG capsule  Commonly known as: CYMBALTA  Take 1 capsule (20 mg total) by mouth once daily.     ELIQUIS 5 mg Tab  Generic drug: apixaban  TAKE 1 TABLET BY MOUTH TWICE A DAY     empagliflozin 10 mg tablet  Commonly known as: Jardiance  Take 1 tablet (10 mg total) by mouth once daily.     fexofenadine 60 MG tablet  Commonly known as: ALLEGRA  Take 60 mg by mouth once daily.     fish oil-omega-3 fatty acids 300-1,000 mg capsule  Take 1 g by mouth once daily.     furosemide 40 MG tablet  Commonly known as: LASIX  Take 40mg (1 tablet) in the morning and 20mg (1/2 tablet) in the afternoon     gabapentin 600 MG tablet  Commonly known as: NEURONTIN  Take 600 mg by mouth 3 (three) times daily.     INV losartan 50 MG Tab  Commonly known as: COZAAR  Take 1 tablet (50 mg total) by mouth once daily.     isosorbide mononitrate 60 MG 24 hr tablet  Commonly known as: IMDUR  Take 1 tablet (60 mg total) by mouth once daily.     LORazepam 0.5 MG  tablet  Commonly known as: ATIVAN  Take 1 tablet (0.5 mg total) by mouth every morning.     metFORMIN 500 MG tablet  Commonly known as: GLUCOPHAGE  Take 1 tablet (500 mg total) by mouth 2 (two) times daily.     montelukast 10 mg tablet  Commonly known as: SINGULAIR  Take 10 mg by mouth once daily.     nitroGLYCERIN 0.4 MG SL tablet  Commonly known as: NITROSTAT  Place 1 tablet (0.4 mg total) under the tongue every 5 (five) minutes as needed for Chest pain.     pantoprazole 40 MG tablet  Commonly known as: PROTONIX  Take 1 tablet (40 mg total) by mouth once daily.     potassium gluconate 550 mg (90 mg) Tab  Take 180 mg by mouth 2 (two) times a day.     ranolazine 1,000 mg Tb12  Commonly known as: RANEXA  Take 1 tablet (1,000 mg total) by mouth 2 (two) times daily.     SYMBICORT 160-4.5 mcg/actuation Hfaa  Generic drug: budesonide-formoterol 160-4.5 mcg  Inhale 1 puff into the lungs 2 (two) times daily.     WOMEN'S 50 PLUS MULTIVITAMIN ORAL  Take 1 tablet by mouth once daily.            STOP taking these medications      carvediloL 12.5 MG tablet  Commonly known as: COREG     diltiaZEM 240 MG 24 hr capsule  Commonly known as: CARDIZEM CD              Indwelling Lines/Drains at time of discharge:   Lines/Drains/Airways       Drain  Duration             Female External Urinary Catheter w/ Suction 02/09/24 2024 6 days                    Time spent on the discharge of patient: 35 minutes         Guillermina Kumar MD  Department of Hospital Medicine  [facility] - Stepdown Flex ([place])

## 2024-02-16 NOTE — DISCHARGE INSTRUCTIONS
Patient Education        Heart Failure Discharge Instructions, Adult   About this topic   Heart failure is a condition where your heart does not pump well. Your heart has trouble pumping the right amount of blood throughout your body. Because of this, fluid can back up in your body and your organs may not get as much blood as they need.  Heart failure is a long-term problem and will get worse over time. Your doctor will work hard to treat your heart failure and to keep you as healthy as possible.     What care is needed at home?   Ask your doctor what you need to do when you go home. Make sure you ask questions if you do not understand what the doctor says. This way you will know what you need to do.  Get lots of rest. Sleep when you feel tired. Avoid doing tiring activities which can make you more short of breath.  Ask your doctor how much exercise you should do every day, such as taking a walk.  Limit how much beer, wine, and mixed drinks (alcohol) you drink.  Your doctor may ask you to limit the amount of salt in your diet. You may also be asked to limit how much fluid you drink.  Try to lose weight if you are overweight. Your doctor or nurse can help.  If you smoke, try to quit. Your doctor or nurse can help.  Let your doctor know if you get more tired while you do an activity or you are not able to do as much activity as you did before.  Be careful that you take your drugs each day as ordered by your doctor.  If you cannot afford your drugs, talk to your doctor.  Do not stop your drugs if you have side effects. Talk to your doctor about them.  Take your drugs even if you feel well.  Check your weight each morning and write down your weight in a notebook. This will tell you if you are building up too much fluid. Weigh in the morning after you have passed urine. Weigh yourself with clothes on or off, but do it the same way each day. Make sure your scale is on a hard surface, not on carpet. Your doctor will tell  you when you should call based on how much weight you gain in a day or over a week. Take your notebook to your doctor on your next visit.  What follow-up care is needed?   Your condition needs to be watched closely. Your doctor may ask you to make visits to the office to check on your progress. Be sure to keep these visits. You may need to have other tests. Your doctor's nurse may also call on a regular basis to ask you about your signs and whether your weight has increased.  Your doctor may order a heart rehab program for you after you leave the hospital. This is an important part of your care. Share your discharge information with the rehab staff so they can plan a program to help you recover. Let your doctor know if you need help finding a program.  You may see a dietitian to help you understand your diet. Some doctors and hospitals have classes to help you learn more about your heart failure and how to manage your symptoms.  What drugs may be needed?   Often patients will need to take more than one drug. Together, they will help the heart work as well as it can. Do not take any other prescription drugs, over-the-counter (OTC) drugs, herbals, or diet aids without asking your doctor.  The doctor may order drugs to:  Help relax blood vessels. This makes it easier for the heart to work and may also lower your blood pressure. These are ACE inhibitors, ARBs, and calcium channel blockers.  Slow down the heart rate so that it doesn't have to work as hard. These are beta-blockers.  Help the heart beat stronger and better. This is digoxin.  Get rid of extra salt and water in the body. These are water pills or diuretics.  Will physical activity be limited?   Talk to your doctor about when you may go back to your normal activities like work, driving, or sex.  Unless your doctor tells you to limit your activities, try to be active. Walk, garden, or do something active for 30 minutes or more on most days of the week. Stop  activity if you have symptoms like chest pain, shortness of breath, or feel dizzy.  What changes to diet are needed?   Ask your doctor or dietician what diet is best for you. The doctor may tell you to limit your salt and fat or how much fluid you drink.  The DASH diet may be helpful. The DASH diet helps to lower blood pressure. This diet includes lots of fruits, vegetables, low-fat dairy foods, and foods that are low in saturated fat, total fat, and cholesterol. Using the DASH diet with a low salt diet may lower blood pressure even more.  You will learn to read labels to see how much salt or sodium is in foods. Lowering your salt intake will help control some of your symptoms.  When do I need to call the doctor?   Activate the emergency medical system right away if you have signs of a heart attack. Call 911 in the [place] or [place]. The sooner treatment begins, the better your chances for recovery. Call for emergency help right away if you have:  Signs of heart attack, which may include:  Severe chest pain, pressure, or discomfort with:  Breathing trouble; sweating; upset stomach; or cold, clammy skin.  Pain in your arms, back, or jaw.  Worse pain with activity like walking up stairs.  Fast or irregular heartbeat.  Feeling dizzy, faint, or weak.     Call your doctor if:  You have so much trouble breathing that you can only say one or two words at a time.  You need to sit upright at all times to be able to breathe and/or cannot lie down.  You are very tired from working to catch your breath or you are sweating from trying to breathe.  You have trouble breathing when talking, sitting still, or with activity.  You have wheezing or chest tightness when you are resting.  You wake up at night because you cant breathe well.  You become very confused or you faint.  You have a very fast or irregular heartbeat.  You cough more than normal or cough up frothy or pink saliva.  You feel very weak, dizzy, or more tired  than normal.  You need more pillows than normal to sleep.  You gain 2 to 3 pounds (0.9 to 1.4 kg) overnight or more than 5 pounds (2.3 kg) in 1 week.  You have more swelling than usual, especially in your feet and ankles or in your belly.  You feel like your heart is beating very fast.  Teach Back: Helping You Understand   The Teach Back Method helps you understand the information we are giving you. After you talk with the staff, tell them in your own words what you learned. This helps to make sure the staff has described each thing clearly. It also helps to explain things that may have been confusing. Before going home, make sure you can do these:  I can tell you about my condition.  I can tell you why I need to weigh myself each day.  I can tell you what I will do if I have signs of a heart attack or stroke.  Where can I learn more?   American Heart Association  http://www.heart.org/HEARTORG/Conditions/HeartFailure/AboutHeartFailure/About-Heart-Failure_UCM_002044_Article.jsp   Better Health Channel  https://www.betterhealth.[place].gov.au/health/conditionsandtreatments/congestive-heart-failure-chf   NHS Choices  http://www.nhs.uk/conditions/heart-failure/pages/introduction.aspx   Last Reviewed Date   2021-06-03  Consumer Information Use and Disclaimer   This information is not specific medical advice and does not replace information you receive from your health care provider. This is only a brief summary of general information. It does NOT include all information about conditions, illnesses, injuries, tests, procedures, treatments, therapies, discharge instructions or life-style choices that may apply to you. You must talk with your health care provider for complete information about your health and treatment options. This information should not be used to decide whether or not to accept your health care providers advice, instructions or recommendations. Only your health care provider has the knowledge and training  to provide advice that is right for you.  Copyright   Copyright © 2021 [employer] and its affiliates and/or licensors. All rights reserved.

## 2024-02-17 NOTE — PLAN OF CARE
[facility] - Stepdown Flex ([place])  Discharge Final Note    Primary Care Provider: Krzysztof Mcgraw MD    Expected Discharge Date: 2/16/2024    Final Discharge Note (most recent)       Final Note - 02/15/24 1533          Final Note    Assessment Type Discharge Planning Reassessment     Hospital Resources/Appts/Education Provided Appointments scheduled and added to AVS        Post-Acute Status    Post-Acute Authorization Other     Coverage [employer] [facility] -     Other Status No Post-Acute Service Needs     Discharge Delays None known at this time                     Important Message from Medicare  Important Message from Medicare regarding Discharge Appeal Rights: Explained to patient/caregiver, Signed/date by patient/caregiver     Date IMM was signed: 02/16/24  Time IMM was signed: 0913    Contact Info       Chencho Moeller MD   Specialty: Electrophysiology, Cardiology    [address] [facility] [place] [address]   Phone: [phone]       Next Steps: Follow up in 4 week(s)    Krzysztof Mcgraw MD   Specialty: Family Medicine   Relationship: PCP - General    [address] [facility]  [address]  [facility] [address]   Phone: [phone]       Next Steps: Follow up in 3 day(s)          Future Appointments   Date Time Provider Department Center   2/21/2024 11:00 AM LAB, APPOINTMENT [facility] VNP [facility]   2/21/2024 11:30 AM Khloe Andujar PA-C [facility]   2/21/2024 11:30 AM [facility] HEART FAILURE NURSE [facility]         CM met with patient/family and discussed discharge plans, patient to [place] home with no needs. No  medications delivered to bedside,  No HME/DME  delivered  to bedside, patient does have oxygen to transport home and follow up appointment[s] scheduled.   Transportation provided by family [spouse].     Caroline Perez RN  Case Management  [facility]  [phone]

## 2024-02-19 ENCOUNTER — TELEPHONE (OUTPATIENT)
Dept: CARDIOLOGY | Facility: CLINIC | Age: 79
End: 2024-02-19
Payer: MEDICARE

## 2024-02-19 NOTE — TELEPHONE ENCOUNTER
"Heart Failure Transitional Care Clinic(HFT[facility]) hospital discharge 48-72 hour phone follow up completed.     Most Recent Hospital Discharge Date: February 16, 2024  Last admission Diagnosis/chief complaint: SOB, BLEE    TCC RN Navigator spoke with  pt, Ms Wright    Current Patient reported weight: 220 lbs    Current Patient reported blood pressure and heart rate: BP: 150/61 P: 60    Pt reports the following:  []  Shortness of Breath with Activity  []  Shortness of Breath at rest   []  Fatigue  []  Edema   [] Chest pain or tightness  [] Weight Increase since discharge  [x] None of the above    Medications:   Discharge medication reviewed with pt.  Pt reports having medication list available and has all medications at home for use per list.   Pt reports a previous incident of N/V with med administration. Pt started taking pills with food and N/V has subsided.      Education:   Confirmed pt received "Home Care Guide for Heart Failure Patients" while admitted.  Reviewed key points as listed below.     Recommend 2 -3 gram sodium restriction and 1500 cc-2000 cc fluid restriction.  Encourage physical activity with graded exercise program.  Requested patient to weigh themselves daily, and to notify us if their weight increases by more than 3 lbs in 1 day or 5 lbs in 3 days.   Reminded patient to use "Daily weight and symptom tracker" to record and guide patient on when and how to call HFTCC. PT may also use symptom tracker if no scale available  Pt reports being in the GREEN Zone. If in yellow/red, reminded that they should be calling HFTCC today or now.     Watch for these Signs and Symptoms: If any of these occur, contact HFTCC immediately:   Increase in shortness of breath with movement   Increase in swelling in your legs and ankles   Weight gain of more than 3 pounds in a day or 5 pounds in 3 days.   Difficulty breathing when you are lying down   Worsening fatigue or tiredness   Stomach bloating, a full feeling or a " loss of appetite   Increased coughing--especially when you are lying down      Pt was able to verbalize back to RN in their own words correct diet/fluid restrictions, necessity for exercise, warning signs and symptoms, when and how to contact their TCC team.      Pt educated on follow-up plan while in HFTCC program to include:   Week 1 -  F/u appt with Provider and RN (Date) Pt appt on 2/21/2024 at 1130. Pt verbalized she will attend.   Week 2-5 - In person/ Virtual/ phone call check ins    Week 5-7 - Pt will discharge from HFTCC and transition to longterm care provider (Cardiology/PCP/ Advanced Heart Failure).      Patient active on myChart? Yes      Pt given the following contact information for ease of communication: [phone] (Mon-Fri, 8a-5p) & for urgent issues on the weekend to page the Heart Transplant MD on call.  Pt also encouraged utilize [facility] messaging as well.      Will follow up with pt at first clinic visit and RN navigator available for pt questions, issues or concerns.

## 2024-02-20 NOTE — PROGRESS NOTES
[facility] Provider Note (Initial Clinic) Consult Note    Date of original referral: 2/14/24  Age: 79 y.o.  Gender: female  Ethnicity: White  Number of admissions for CHF within the preceding year: 2  Duration of CHF: 1.5 years  Type of Congestive Heart Failure: new HFrEF  Etiology: Non-ischemic , suspected PICM  Social history: denies smoking history  Enrolled in Infusion suite: no    Diagnostic Labs:   EKG - 02/14/2024  CXR - 02/09/2024  ECHO - 02/10/2024  Stress test -   Stress echo - 02/14/2024  Pharmacologic stress -   Cardiac catheterization - 02/15/2024   Cardiac MRI -     Lab Results   Component Value Date     02/16/2024     02/15/2024    K 3.4 (L) 02/16/2024    K 3.5 02/15/2024    CL 99 02/16/2024    CL 94 (L) 02/15/2024    CO2 33 (H) 02/16/2024    CO2 32 (H) 02/15/2024     02/16/2024     (H) 02/15/2024    BUN 31 (H) 02/16/2024    BUN 24 (H) 02/15/2024    CREATININE 0.8 02/16/2024    CREATININE 0.9 02/15/2024    CALCIUM 9.1 02/16/2024    CALCIUM 9.2 02/15/2024    PROT 7.4 02/09/2024    PROT 7.0 10/27/2023    ALBUMIN 3.7 02/09/2024    ALBUMIN 3.7 10/27/2023    BILITOT 0.3 02/09/2024    BILITOT 0.4 10/27/2023    ALKPHOS 44 (L) 02/09/2024    ALKPHOS 40 (L) 10/27/2023    AST 31 02/09/2024    AST 18 10/27/2023    ALT 14 02/09/2024    ALT 16 10/27/2023    ANIONGAP 8 02/16/2024    ANIONGAP 11 02/15/2024    ESTGFRAFRICA >60 12/04/2021    ESTGFRAFRICA >60.0 08/17/2021    EGFRNONAA >60 12/04/2021    EGFRNONAA >60.0 08/17/2021       Lab Results   Component Value Date    WBC 7.70 02/16/2024    WBC 6.77 02/13/2024    RBC 3.81 (L) 02/16/2024    RBC 3.92 (L) 02/13/2024    HGB 12.0 02/16/2024    HGB 12.0 02/13/2024    HCT 35.6 (L) 02/16/2024    HCT 37.2 02/13/2024    MCV 93 02/16/2024    MCV 95 02/13/2024    MCH 31.5 (H) 02/16/2024    MCH 30.6 02/13/2024    MCHC 33.7 02/16/2024    MCHC 32.3 02/13/2024    RDW 15.4 (H) 02/16/2024    RDW 15.3 (H) 02/13/2024     02/16/2024     02/13/2024     MPV 10.8 02/16/2024    MPV 10.7 02/13/2024    IMMGR 0.3 02/16/2024    IMMGR 0.3 02/13/2024    IGABS 0.02 02/16/2024    IGABS 0.02 02/13/2024    LYMPH 1.5 02/16/2024    LYMPH 19.7 02/16/2024    MONO 0.7 02/16/2024    MONO 9.6 02/16/2024    EOS 0.0 02/16/2024    EOS 0.0 02/13/2024    BASO 0.02 02/16/2024    BASO 0.03 02/13/2024    NRBC 0 02/16/2024    NRBC 0 02/13/2024    GRAN 5.4 02/16/2024    GRAN 70.1 02/16/2024    EOSINOPHIL 0.0 02/16/2024    EOSINOPHIL 0.4 02/13/2024    BASOPHIL 0.3 02/16/2024    BASOPHIL 0.4 02/13/2024    PLTEST CANCELED 09/28/2016    PLTEST Increased (A) 05/11/2011    ANISO Slight 05/09/2015    ANISO sl 05/11/2011    HYPO Occasional 05/09/2015    HYPO sl 05/11/2011       Lab Results   Component Value Date     (H) 02/09/2024     (H) 10/27/2023    MG 1.7 02/13/2024    MG 1.9 10/27/2023    PHOS 4.5 10/27/2023    PHOS 3.9 10/17/2023    NTPROBNP 657 08/25/2020    TROPONINI 0.011 02/13/2024    TROPONINI <0.006 02/09/2024    HGBA1C 5.0 02/09/2024    HGBA1C 5.3 09/21/2023    TSH 3.327 02/09/2024    TSH 0.102 (L) 09/30/2022    FREET4 1.10 09/30/2022    FREET4 1.03 08/11/2022       Lab Results   Component Value Date    IRON 39 09/30/2022    TIBC 429 09/30/2022    FERRITIN 81 09/30/2022    CHOL 149 03/22/2023    TRIG 118 03/22/2023    HDL 63 03/22/2023    LDLCALC 62.4 (L) 03/22/2023    CHOLHDL 42.3 03/22/2023    TOTALCHOLEST 2.4 03/22/2023    NONHDLCHOL 86 03/22/2023    COLORU Yellow 09/21/2023    APPEARANCEUA Clear 09/21/2023    PHUR 7.0 09/21/2023    SPECGRAV 1.010 09/21/2023    PROTEINUA Negative 09/21/2023    GLUCUA Negative 09/21/2023    KETONESU Negative 09/21/2023    BILIRUBINUA Negative 09/21/2023    OCCULTUA 1+ (A) 09/21/2023    NITRITE Negative 09/21/2023    LEUKOCYTESUR Negative 09/21/2023       List all implanted cardiac devices:   Pacemaker: Single    Current Outpatient Medications on File Prior to Visit   Medication Sig Dispense Refill    acetaminophen (TYLENOL) 650 MG TbSR  Take 1,300 mg by mouth 2 (two) times daily as needed.      albuterol-ipratropium (DUO-NEB) 2.5 mg-0.5 mg/3 mL nebulizer solution Take 3 mLs by nebulization every 4 (four) hours as needed for Wheezing or Shortness of Breath.      apixaban (ELIQUIS) 5 mg Tab TAKE 1 TABLET BY MOUTH TWICE A  tablet 3    ascorbic acid, vitamin C, (VITAMIN C) 500 MG tablet Take 1,000 mg by mouth once daily.       atorvastatin (LIPITOR) 10 MG tablet Take 1 tablet (10 mg total) by mouth once daily. 30 tablet 0    calcium carbonate/vitamin D3 (CALTRATE 600 + D ORAL) Take 1 tablet by mouth once daily.      clopidogreL (PLAVIX) 75 mg tablet TAKE 1 TABLET BY MOUTH EVERY DAY 90 tablet 3    DULoxetine (CYMBALTA) 20 MG capsule Take 1 capsule (20 mg total) by mouth once daily. 30 capsule 0    empagliflozin (JARDIANCE) 10 mg tablet Take 1 tablet (10 mg total) by mouth once daily. 30 tablet 11    fexofenadine (ALLEGRA) 60 MG tablet Take 60 mg by mouth once daily.      fish oil-omega-3 fatty acids 300-1,000 mg capsule Take 1 g by mouth once daily.       furosemide (LASIX) 40 MG tablet Take 40mg (1 tablet) in the morning and 20mg (1/2 tablet) in the afternoon 60 tablet 11    gabapentin (NEURONTIN) 600 MG tablet Take 600 mg by mouth 3 (three) times daily.      isosorbide mononitrate (IMDUR) 60 MG 24 hr tablet Take 1 tablet (60 mg total) by mouth once daily. 30 tablet 0    LORazepam (ATIVAN) 0.5 MG tablet Take 1 tablet (0.5 mg total) by mouth every morning. 30 tablet 2    losartan (COZAAR) 50 MG Tab Take 1 tablet (50 mg total) by mouth once daily. 30 tablet 11    metFORMIN (GLUCOPHAGE) 500 MG tablet Take 1 tablet (500 mg total) by mouth 2 (two) times daily. 60 tablet 0    montelukast (SINGULAIR) 10 mg tablet Take 10 mg by mouth once daily.      mv-mn/folic ac/calcium/vit K1 (WOMEN'S 50 PLUS MULTIVITAMIN ORAL) Take 1 tablet by mouth once daily.      nitroGLYCERIN (NITROSTAT) 0.4 MG SL tablet Place 1 tablet (0.4 mg total) under the tongue every 5  (five) minutes as needed for Chest pain. 25 tablet PRN    pantoprazole (PROTONIX) 40 MG tablet Take 1 tablet (40 mg total) by mouth once daily. 90 tablet 3    potassium gluconate 550 mg (90 mg) Tab Take 180 mg by mouth 2 (two) times a day.      ranolazine (RANEXA) 1,000 mg Tb12 Take 1 tablet (1,000 mg total) by mouth 2 (two) times daily. 180 tablet 3    SYMBICORT 160-4.5 mcg/actuation HFAA Inhale 1 puff into the lungs 2 (two) times daily.       Current Facility-Administered Medications on File Prior to Visit   Medication Dose Route Frequency Provider Last Rate Last Admin    0.9%  NaCl infusion   Intravenous Continuous Lynette Hightower NP   New Bag at 02/10/20 1029    sodium chloride 0.9% flush 5 mL  5 mL Intravenous PRN Lynette Hightower NP             HPI:  Patient wearing 2L supplemental O2 continuously since discharge. Denies subjective improvement in SOB since discharge. Estimates can walk 15 feet before SOB. Presents to clinic in wheelchair.   Patient sleeps on 2 number of pillows with wedge pillow. Unable to lay flat at baseline   Patient wakes up SOB, has to get out of bed, associated cough- denies PND symptoms, endorses intermittent dry cough, not worsened with laying flat   Palpitations - denies palpitations, endorses episode of sharp CP yesterday lasting 2 minutes in duration, resolved with prn nitro (LHC during admission with nonobstructive CAD)   Dizzy, light-headed, pre-syncope or syncope- denies   Other information felt pertinent to HPI: Ms. Adriana Strong is a 79 yo female with a PMHx of COPD, HFpEF, CAD, persistent AF s/p DC PPM in 2022 on eliquis, AS s/p TAVR, HLD, HTN, DM2, mesenteric ischemia who presents to second [facility] enrollment following recent admission for ADHF after being admitted from Cardiology clinic. Started on IV lasix and O2 per protocol. Updated Echo revealed new decreased in EF to 35-40% and regional wall motion abnormalities present. Consulted cards who recommended EP  for possible pacemaker induced CM. EP evaluated patient and switched her PPM settings to 60 VVI. Rec stopping AVN blockers with plan to re-evaluate patient in 3-4 weeks in clinic. Noted GDMT unlikely to provide benefit if 2/2 PICM. Patient had PET stress test on 2/14. IC consulted for transient ischemic dilation on stress test and concern for triple vessel disease. She underwent LHC on 2/15 which revealed non-obstructive CAD. No stents placed. She was adequately diuresed and deemed appropriate for d/c. Advised to follow up with EP (Dr. Moeller) in 3-4 weeks.       PHYSICAL:   Vitals:    02/21/24 1120   BP: (!) 142/64   Pulse: 60        Wt Readings from Last 3 Encounters:   10/02/23 110.5 kg (243 lb 9.6 oz)   09/23/23 115 kg (253 lb 8.5 oz)   09/08/23 115.2 kg (253 lb 15.5 oz)       JVD: yes, 2cm above clavicle sitting    Heart rhythm: regular  Cardiac murmur: No    S3: no  S4: no  Lungs: crackles in posterior lung bases  Hepatojugular reflux: yes  Edema: yes, 1+ BLE     Echo 2/10/24:    Left Ventricle: The left ventricle is normal in size. Normal wall thickness. There is concentric remodeling. Regional wall motion abnormalities present. There is moderately reduced systolic function with a visually estimated ejection fraction of 35 - 40%. Ejection fraction by visual approximation is 35%. There is normal diastolic function.    Right Ventricle: Normal right ventricular cavity size. Wall thickness is normal. Right ventricle wall motion  is normal. Systolic function is normal. Pacemaker lead present in the ventricle.    Left Atrium: Left atrium is moderately dilated.    Right Atrium: Right atrium is mildly dilated. Lead present in the right atrium.    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 Aortic valve area by VTI is 1.03 cm². Aortic valve peak velocity is 2.07 m/s. Mean gradient is 11 mmHg. The dimensionless index is 0.30. There is trace aortic regurgitation.    Mitral Valve:  There is mild mitral annular calcification present.    Tricuspid Valve: There is mild regurgitation.    Pulmonary Artery: There is mild to moderate pulmonary hypertension. The estimated pulmonary artery systolic pressure is 47 mmHg.    IVC/SVC: Normal venous pressure at 3 mmHg.    ASSESSMENT: Chronic diastolic HF    PLAN:      Patient Instructions:   Instruct the patient to notify this clinic if HH, a physician or an advanced care provider wants to change medication one of their HF medications   Activity and Diet restrictions:   Recommend 2-3 gram sodium restriction and 1500cc- 2000cc fluid restriction.  Encourage physical activity with graded exercise program.  Requested patient to weigh themselves daily, and to notify us if their weight increases by more than 3 lbs in 1 day or 5 lbs in 3 days.    Medication changes (include current dose and changed dose): NYHA Class III symptoms. Volume up on exam. Diuresed with IVP lasix 80mg BID during admission, but discharged on previous dose of lasix 40mg in the morning/20mg in the afternoon. Increase lasix to 60mg BID for now. Start aldactone 25mg once daily for K supplementation and additional GDMT (though its suspected new HFrEF 2/2 PICM with limited role of GDMT).    Phone check in Friday to assess diuresis. RTC in 1 week for repeat labs and close follow up.    Other diagnostic tests ordered: Will need close follow up with EP for suspected PICM. Holding all izabella blocking agents.    Khloe Andujar PA-C

## 2024-02-21 ENCOUNTER — DOCUMENTATION ONLY (OUTPATIENT)
Dept: CARDIOLOGY | Facility: CLINIC | Age: 79
End: 2024-02-21
Payer: MEDICARE

## 2024-02-21 ENCOUNTER — OFFICE VISIT (OUTPATIENT)
Dept: CARDIOLOGY | Facility: CLINIC | Age: 79
End: 2024-02-21
Payer: MEDICARE

## 2024-02-21 ENCOUNTER — LAB VISIT (OUTPATIENT)
Dept: LAB | Facility: HOSPITAL | Age: 79
End: 2024-02-21
Payer: MEDICARE

## 2024-02-21 VITALS
HEART RATE: 60 BPM | DIASTOLIC BLOOD PRESSURE: 64 MMHG | OXYGEN SATURATION: 95 % | WEIGHT: 223.31 LBS | HEIGHT: 65 IN | BODY MASS INDEX: 37.2 KG/M2 | SYSTOLIC BLOOD PRESSURE: 142 MMHG

## 2024-02-21 DIAGNOSIS — I48.19 PERSISTENT ATRIAL FIBRILLATION: ICD-10-CM

## 2024-02-21 DIAGNOSIS — E66.01 MORBID OBESITY: ICD-10-CM

## 2024-02-21 DIAGNOSIS — I10 ESSENTIAL HYPERTENSION: ICD-10-CM

## 2024-02-21 DIAGNOSIS — Z95.2 S/P TAVR (TRANSCATHETER AORTIC VALVE REPLACEMENT): ICD-10-CM

## 2024-02-21 DIAGNOSIS — J42 CHRONIC BRONCHITIS, UNSPECIFIED CHRONIC BRONCHITIS TYPE: ICD-10-CM

## 2024-02-21 DIAGNOSIS — I35.0 AORTIC VALVE STENOSIS, ETIOLOGY OF CARDIAC VALVE DISEASE UNSPECIFIED: ICD-10-CM

## 2024-02-21 DIAGNOSIS — R06.02 SOB (SHORTNESS OF BREATH): ICD-10-CM

## 2024-02-21 DIAGNOSIS — Z95.0 CARDIAC PACEMAKER IN SITU: ICD-10-CM

## 2024-02-21 DIAGNOSIS — K55.069 OCCLUSION OF SUPERIOR MESENTERIC ARTERY: ICD-10-CM

## 2024-02-21 DIAGNOSIS — E78.5 HYPERLIPIDEMIA, UNSPECIFIED HYPERLIPIDEMIA TYPE: ICD-10-CM

## 2024-02-21 DIAGNOSIS — I50.20 HFREF (HEART FAILURE WITH REDUCED EJECTION FRACTION): Primary | ICD-10-CM

## 2024-02-21 DIAGNOSIS — I25.111 CORONARY ARTERY DISEASE INVOLVING NATIVE CORONARY ARTERY OF NATIVE HEART WITH ANGINA PECTORIS WITH DOCUMENTED SPASM: ICD-10-CM

## 2024-02-21 LAB
ALBUMIN SERPL BCP-MCNC: 3.6 G/DL (ref 3.5–5.2)
ALP SERPL-CCNC: 38 U/L (ref 55–135)
ALT SERPL W/O P-5'-P-CCNC: 15 U/L (ref 10–44)
ANION GAP SERPL CALC-SCNC: 9 MMOL/L (ref 8–16)
AST SERPL-CCNC: 16 U/L (ref 10–40)
BASOPHILS # BLD AUTO: 0.03 K/UL (ref 0–0.2)
BASOPHILS NFR BLD: 0.5 % (ref 0–1.9)
BILIRUB SERPL-MCNC: 0.4 MG/DL (ref 0.1–1)
BNP SERPL-MCNC: 331 PG/ML (ref 0–99)
BUN SERPL-MCNC: 20 MG/DL (ref 8–23)
CALCIUM SERPL-MCNC: 9.7 MG/DL (ref 8.7–10.5)
CHLORIDE SERPL-SCNC: 96 MMOL/L (ref 95–110)
CO2 SERPL-SCNC: 34 MMOL/L (ref 23–29)
CREAT SERPL-MCNC: 0.8 MG/DL (ref 0.5–1.4)
DIFFERENTIAL METHOD BLD: ABNORMAL
EOSINOPHIL # BLD AUTO: 0 K/UL (ref 0–0.5)
EOSINOPHIL NFR BLD: 0.5 % (ref 0–8)
ERYTHROCYTE [DISTWIDTH] IN BLOOD BY AUTOMATED COUNT: 14.9 % (ref 11.5–14.5)
EST. GFR  (NO RACE VARIABLE): >60 ML/MIN/1.73 M^2
GLUCOSE SERPL-MCNC: 91 MG/DL (ref 70–110)
HCT VFR BLD AUTO: 38.9 % (ref 37–48.5)
HGB BLD-MCNC: 12.3 G/DL (ref 12–16)
IMM GRANULOCYTES # BLD AUTO: 0.07 K/UL (ref 0–0.04)
IMM GRANULOCYTES NFR BLD AUTO: 1.1 % (ref 0–0.5)
LYMPHOCYTES # BLD AUTO: 1.3 K/UL (ref 1–4.8)
LYMPHOCYTES NFR BLD: 19.8 % (ref 18–48)
MAGNESIUM SERPL-MCNC: 1.8 MG/DL (ref 1.6–2.6)
MCH RBC QN AUTO: 30.8 PG (ref 27–31)
MCHC RBC AUTO-ENTMCNC: 31.6 G/DL (ref 32–36)
MCV RBC AUTO: 97 FL (ref 82–98)
MONOCYTES # BLD AUTO: 0.6 K/UL (ref 0.3–1)
MONOCYTES NFR BLD: 8.9 % (ref 4–15)
NEUTROPHILS # BLD AUTO: 4.6 K/UL (ref 1.8–7.7)
NEUTROPHILS NFR BLD: 69.2 % (ref 38–73)
NRBC BLD-RTO: 0 /100 WBC
PHOSPHATE SERPL-MCNC: 3.9 MG/DL (ref 2.7–4.5)
PLATELET # BLD AUTO: 294 K/UL (ref 150–450)
PMV BLD AUTO: 10.3 FL (ref 9.2–12.9)
POTASSIUM SERPL-SCNC: 4.1 MMOL/L (ref 3.5–5.1)
PROT SERPL-MCNC: 6.7 G/DL (ref 6–8.4)
RBC # BLD AUTO: 4 M/UL (ref 4–5.4)
SODIUM SERPL-SCNC: 139 MMOL/L (ref 136–145)
WBC # BLD AUTO: 6.61 K/UL (ref 3.9–12.7)

## 2024-02-21 PROCEDURE — 83880 ASSAY OF NATRIURETIC PEPTIDE: CPT

## 2024-02-21 PROCEDURE — 80053 COMPREHEN METABOLIC PANEL: CPT

## 2024-02-21 PROCEDURE — 85025 COMPLETE CBC W/AUTO DIFF WBC: CPT

## 2024-02-21 PROCEDURE — 83735 ASSAY OF MAGNESIUM: CPT

## 2024-02-21 PROCEDURE — 36415 COLL VENOUS BLD VENIPUNCTURE: CPT

## 2024-02-21 PROCEDURE — 84100 ASSAY OF PHOSPHORUS: CPT

## 2024-02-21 PROCEDURE — 99214 OFFICE O/P EST MOD 30 MIN: CPT | Mod: S$GLB,,,

## 2024-02-21 PROCEDURE — 99999 PR PBB SHADOW E&M-EST. PATIENT-LVL V: CPT | Mod: PBBFAC,,,

## 2024-02-21 RX ORDER — SPIRONOLACTONE 25 MG/1
25 TABLET ORAL DAILY
Qty: 30 TABLET | Refills: 11 | Status: SHIPPED | OUTPATIENT
Start: 2024-02-21 | End: 2025-02-20

## 2024-02-21 NOTE — PATIENT INSTRUCTIONS
Stop over the counter potassium.    Start aldactone (spironolactone) 25mg once daily (will help with fluid and potassium supplementation).    Take lasix (furosemide) 60mg (1.5 tablets) twice daily for now.    Will plan for phone check in Friday and further lasix instructions at that time.

## 2024-02-21 NOTE — PHYSICIAN QUERY
PT Name: Adriana Strong  MR #: [id number]    DOCUMENTATION CLARIFICATION     CDS/: Raquel Olson RN             Contact information: [email or web address]       This form is a permanent document in the medical record.     Query Date: February 21, 2024    By submitting this query, we are merely seeking further clarification of documentation. Please utilize your independent clinical judgment when addressing the question(s) below.    The Medical Record contains the following:   Indicators Supporting Clinical Findings Location in Medical Record   x Atrial Fibrillation Past Medical History:   Paroxysmal atrial fibrillation 10/30/2020   Persistent atrial fibrillation   2/10/2020      Persistent atrial fibrillation  - continue home Eliquis  - continue home coreg and diltiazem   H&P 2/9       [facility]   x EKG Results Ventricular-paced rhythm  Abnormal ECG   EKG 2/9   x Medication - continue home Eliquis  - continue home coreg and diltiazem H&P 2/9       [facility]    Treatment      Other       The clinical guidelines noted are only a system guideline. It does not replace the provider's clinical judgment.    Due to conflicting documentation, please clarify the type of Atrial Fibrillation (AF):    [  ] Paroxysmal - defined as AF that terminates spontaneously or with intervention within < 7 days of onset, episodes may recur with variable frequency     [x  ] Long-standing persistent - AF that has lasted for > 12 months      [  ] Other Persistent - defined as AF that sustained for ?7 days; Episodes often require pharmacologic or electrical cardioversion to restore sinus rhythm     [  ] Other cardiac diagnosis (please specify): ____________           Please document in your progress notes daily for the duration of treatment until resolved, and include in your discharge summary.    Reference:  GALLO Rosales MD. (2020, September 14). Overview of atrial fibrillation (CICI Laura MD & SOFIA Puente MD, Eds.). Retrieved  October 22, 2020, from [email or web address]&source=search_result&selectedTitle=1~150&usage_type=default&display_rank=1    Form No. [id number]

## 2024-02-21 NOTE — PHYSICIAN QUERY
PT Name: Adriana Strong  MR #: [id number]    DOCUMENTATION CLARIFICATION     CDS/: Raquel Olson RN             Contact information: [email or web address]     This form is a permanent document in the medical record.     Query Date: February 21, 2024    Dear Provider,  By submitting this query, we are merely seeking further clarification of documentation. Please utilize your independent clinical judgment when addressing the question(s) below.    The medical record contains the following:  Supporting Clinical Findings   Location in Medical Record   Patient's last echo on 2/9/24 revealed newly reduced ejection fraction at 35%. Patient has history of permanent pacemaker placement in 2022.       Rec consulting EP. 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 Changed pacemaker setting. Rec stopping AVN blockers and GDMT since reduced EF seems to be sec to  pacemaker induced  HF. PN 2/14       Cardiology      DC summary 2/16       [facility]       Please clarify if ____ Heart Failure ________ (as it relates to __ Pacemaker Placement ___) is:      [x  ] Complication of the procedure     [  ] Present, but not a complication of the procedure     [  ] Other (please specify): __________________     [  ] Clinically Undetermined         Please document in your progress notes daily for the duration of treatment until resolved and include in your discharge summary.

## 2024-02-21 NOTE — PROGRESS NOTES
Pt here with her  and 2 daughters for her 2nd enrollment in the [facility]. Refresher done with the whole family on the importance of adhering to the 2-3 gram Sodium diet and the 1.5 to 2 Quarts of fluid. Stressed the importance of measuring all fluids and using fresh and frozen foods. Explained if it has a Label on it it was processed and has Salt in it. PT and Family receptive. PT asks me to look at her legs and she has at least 1+ pitting Edema. Daughter asks what is that and I explain my finger is pushing the fluid out of the way and when I reach the bone it leaves a dent and then the fluid slowly returns and fills in the dent. PT/family have no other questions.

## 2024-02-22 ENCOUNTER — TELEPHONE (OUTPATIENT)
Dept: ELECTROPHYSIOLOGY | Facility: CLINIC | Age: 79
End: 2024-02-22
Payer: MEDICARE

## 2024-02-22 DIAGNOSIS — I48.92 ATRIAL FLUTTER, UNSPECIFIED TYPE: Primary | ICD-10-CM

## 2024-02-22 NOTE — TELEPHONE ENCOUNTER
----- Message from Ermelinda Decker RN sent at 2/22/2024  9:31 AM CST -----  Regarding: RE: Appointment  Offer a new spot on 3/21  ----- Message -----  From: Zofia Ellsworth MA  Sent: 2/22/2024   8:21 AM CST  To: Ermelinda Decker RN  Subject: RE: Appointment                                  Can you assist? Please and thanks   ----- Message -----  From: Khloe Andujar PA-C  Sent: 2/21/2024   5:23 PM CST  To: Sajan HERNANDEZ Staff  Subject: Appointment                                      Hi team,    Would you all mind getting patient scheduled for appointment in 4 weeks for further evaluation of possible pacemaker induced CM per recent hospital discharge recs?    ThanksKhloe          Daily Note     Today's date: 2018  Patient name: Manuel Solitario  :   MRN: [id number]  Referring provider: Lisa Figueroa MD  Dx:   No diagnosis found  Subjective: Reports soreness after last visit, however, resolved after 2 days  Objective: See treatment diary below  Precautions     Specialty Daily Treatment Diary     Manual      Patellar mobs 5 min 5 min 5 min 5min    Knee flex/ext stretch 10 min 10 min 10 min 10m                                Exercise Diary      Nu step NT 12 min 12 min 12m    SAQ 0# 2x10 2# 5s x20 2# 5s x20 21/2# 5s x20    SLR  2x10 2x10 2x10 3x10    Supine hip abd  2x10 2x10 2x10    Front/lat step ups  2x10 2x10 6in 2x10                                    Gait  2 laps 1 lap 1 lap NT                                                                                        Modalities                                        Assessment:  ROM progressing   Starting to add more AROM functionally to ex to promote pt confidence  Extension was 0 degrees passviely in supine today    Plan: Continue per plan of care

## 2024-02-26 ENCOUNTER — PATIENT MESSAGE (OUTPATIENT)
Dept: CARDIOLOGY | Facility: CLINIC | Age: 79
End: 2024-02-26
Payer: MEDICARE

## 2024-02-28 NOTE — PROGRESS NOTES
[facility] Provider Note (Follow-up) Consult Note      HPI:     Patient wearing 2L supplemental O2 continuously since discharge. Today reports SOB improved from prior visit. Completing PT exercises without issue while wearing supplemental O2. Presents to clinic in wheelchair.              Patient now sleeping on 2 number of pillows without wedge pillow. Unable to lay flat at baseline.               Patient wakes up SOB, has to get out of bed, associated cough- denies PND symptoms, endorses intermittent dry cough, not worsened with laying flat              Palpitations - occasional palpitations associated with anxiety, denies further episodes of CP               Dizzy, light-headed, pre-syncope or syncope- denies              Other information felt pertinent to HPI: Ms. Adriana Strong is a 79 yo female with a PMHx of COPD, HFpEF, CAD, persistent AF s/p DC PPM in 2022 on eliquis, AS s/p TAVR, HLD, HTN, DM2, mesenteric ischemia who presents to second [facility] enrollment following recent admission for ADHF after being admitted from Cardiology clinic. Started on IV lasix and O2 per protocol. Updated Echo revealed new decreased in EF to 35-40% and regional wall motion abnormalities present. Consulted cards who recommended EP for possible pacemaker induced CM. EP evaluated patient and switched her PPM settings to 60 VVI. Rec stopping AVN blockers with plan to re-evaluate patient in 3-4 weeks in clinic. Noted GDMT unlikely to provide benefit if 2/2 PICM. Patient had PET stress test on 2/14. IC consulted for transient ischemic dilation on stress test and concern for triple vessel disease. She underwent LHC on 2/15 which revealed non-obstructive CAD. No stents placed. She was adequately diuresed and deemed appropriate for d/c. Advised to follow up with EP (Dr. Moeller) in 3-4 weeks.     2/29/24: Last visit we started aldactone 25mg daily and increased lasix to 60mg BID. Today she reports improvement in SOB and BLE edema. Down 8lbs on  clinic scales.     PHYSICAL:   Vitals:    02/29/24 1323   BP: (!) 140/63   Pulse: 60      @[id number](3)@    JVD: yes, 2cm above clavicle sitting    Heart rhythm: regular  Cardiac murmur: No    S3: no  S4: no  Lungs: crackles in posterior lung bases  Hepatojugular reflux: yes  Edema: yes, 1+ BLE     Lab Results   Component Value Date     02/21/2024    K 4.1 02/21/2024    MG 1.8 02/21/2024    CL 96 02/21/2024    CO2 34 (H) 02/21/2024    BUN 20 02/21/2024    CREATININE 0.8 02/21/2024    GLU 91 02/21/2024    CALCIUM 9.7 02/21/2024    AST 16 02/21/2024    ALT 15 02/21/2024    ALBUMIN 3.6 02/21/2024    PROT 6.7 02/21/2024    BILITOT 0.4 02/21/2024     Lab Results   Component Value Date     (H) 02/21/2024     (H) 02/09/2024     (H) 10/27/2023       ASSESSMENT: Chronic diastolic HF     PLAN:      Patient Instructions:   Instruct the patient to notify this clinic if HH, a physician or an advanced care provider wants to change medication one of their HF medications   Activity and Diet restrictions:   Recommend 2-3 gram sodium restriction and 1500cc- 2000cc fluid restriction.  Encourage physical activity with graded exercise program.  Requested patient to weigh themselves daily, and to notify us if their weight increases by more than 3 lbs in 1 day or 5 lbs in 3 days.    Assigned dry weight on home scale: Weight 214lbs  NYHA Class III symptoms. Down 8lbs on clinic scale from last week. Previously on  lasix 40mg in the morning/20mg in the afternoon as maintenance diuretic. Increase maintenance lasix dose to 40mg BID. Okay to take an additional 40mg lasix in the morning (80mg total) prn for weight gain/worsening fluid symptoms.     RD education provided during visit.     Phone check in Friday to assess diuresis. RTC in 2 weeks or sooner prn      Other diagnostic tests ordered: Will need close follow up with EP for suspected PICM. Holding all izabella blocking agents.     Khloe Andujar PA-C

## 2024-02-29 ENCOUNTER — NUTRITION (OUTPATIENT)
Dept: TRANSPLANT | Facility: CLINIC | Age: 79
End: 2024-02-29
Payer: MEDICARE

## 2024-02-29 ENCOUNTER — TELEPHONE (OUTPATIENT)
Dept: CARDIOLOGY | Facility: CLINIC | Age: 79
End: 2024-02-29

## 2024-02-29 ENCOUNTER — LAB VISIT (OUTPATIENT)
Dept: LAB | Facility: HOSPITAL | Age: 79
End: 2024-02-29
Payer: MEDICARE

## 2024-02-29 ENCOUNTER — OFFICE VISIT (OUTPATIENT)
Dept: CARDIOLOGY | Facility: CLINIC | Age: 79
End: 2024-02-29
Payer: MEDICARE

## 2024-02-29 VITALS
HEIGHT: 63 IN | OXYGEN SATURATION: 95 % | HEART RATE: 60 BPM | SYSTOLIC BLOOD PRESSURE: 140 MMHG | DIASTOLIC BLOOD PRESSURE: 63 MMHG | WEIGHT: 215.81 LBS | BODY MASS INDEX: 38.24 KG/M2

## 2024-02-29 DIAGNOSIS — Z95.0 CARDIAC PACEMAKER IN SITU: ICD-10-CM

## 2024-02-29 DIAGNOSIS — E66.01 MORBID OBESITY: ICD-10-CM

## 2024-02-29 DIAGNOSIS — I48.19 PERSISTENT ATRIAL FIBRILLATION: ICD-10-CM

## 2024-02-29 DIAGNOSIS — I25.111 CORONARY ARTERY DISEASE INVOLVING NATIVE CORONARY ARTERY OF NATIVE HEART WITH ANGINA PECTORIS WITH DOCUMENTED SPASM: ICD-10-CM

## 2024-02-29 DIAGNOSIS — Z95.2 S/P TAVR (TRANSCATHETER AORTIC VALVE REPLACEMENT): ICD-10-CM

## 2024-02-29 DIAGNOSIS — I50.20 HFREF (HEART FAILURE WITH REDUCED EJECTION FRACTION): Primary | ICD-10-CM

## 2024-02-29 DIAGNOSIS — E78.5 HYPERLIPIDEMIA, UNSPECIFIED HYPERLIPIDEMIA TYPE: ICD-10-CM

## 2024-02-29 DIAGNOSIS — J42 CHRONIC BRONCHITIS, UNSPECIFIED CHRONIC BRONCHITIS TYPE: ICD-10-CM

## 2024-02-29 DIAGNOSIS — I35.0 AORTIC VALVE STENOSIS, ETIOLOGY OF CARDIAC VALVE DISEASE UNSPECIFIED: ICD-10-CM

## 2024-02-29 DIAGNOSIS — I10 ESSENTIAL HYPERTENSION: ICD-10-CM

## 2024-02-29 DIAGNOSIS — I50.23 ACUTE ON CHRONIC SYSTOLIC HEART FAILURE: Primary | ICD-10-CM

## 2024-02-29 DIAGNOSIS — R06.02 SOB (SHORTNESS OF BREATH): ICD-10-CM

## 2024-02-29 LAB
ALBUMIN SERPL BCP-MCNC: 3.9 G/DL (ref 3.5–5.2)
ALP SERPL-CCNC: 38 U/L (ref 55–135)
ALT SERPL W/O P-5'-P-CCNC: 13 U/L (ref 10–44)
ANION GAP SERPL CALC-SCNC: 13 MMOL/L (ref 8–16)
AST SERPL-CCNC: 18 U/L (ref 10–40)
BILIRUB SERPL-MCNC: 0.4 MG/DL (ref 0.1–1)
BNP SERPL-MCNC: 171 PG/ML (ref 0–99)
BUN SERPL-MCNC: 36 MG/DL (ref 8–23)
CALCIUM SERPL-MCNC: 9.8 MG/DL (ref 8.7–10.5)
CHLORIDE SERPL-SCNC: 94 MMOL/L (ref 95–110)
CO2 SERPL-SCNC: 32 MMOL/L (ref 23–29)
CREAT SERPL-MCNC: 1.1 MG/DL (ref 0.5–1.4)
EST. GFR  (NO RACE VARIABLE): 50.5 ML/MIN/1.73 M^2
GLUCOSE SERPL-MCNC: 111 MG/DL (ref 70–110)
MAGNESIUM SERPL-MCNC: 1.6 MG/DL (ref 1.6–2.6)
PHOSPHATE SERPL-MCNC: 4.1 MG/DL (ref 2.7–4.5)
POTASSIUM SERPL-SCNC: 3.8 MMOL/L (ref 3.5–5.1)
PROT SERPL-MCNC: 7 G/DL (ref 6–8.4)
SODIUM SERPL-SCNC: 139 MMOL/L (ref 136–145)

## 2024-02-29 PROCEDURE — 83880 ASSAY OF NATRIURETIC PEPTIDE: CPT

## 2024-02-29 PROCEDURE — 83735 ASSAY OF MAGNESIUM: CPT

## 2024-02-29 PROCEDURE — 84100 ASSAY OF PHOSPHORUS: CPT

## 2024-02-29 PROCEDURE — 80053 COMPREHEN METABOLIC PANEL: CPT

## 2024-02-29 PROCEDURE — 97803 MED NUTRITION INDIV SUBSEQ: CPT | Mod: S$GLB,,,

## 2024-02-29 PROCEDURE — 99214 OFFICE O/P EST MOD 30 MIN: CPT | Mod: S$GLB,,,

## 2024-02-29 PROCEDURE — 99999 PR PBB SHADOW E&M-EST. PATIENT-LVL V: CPT | Mod: PBBFAC,,,

## 2024-02-29 RX ORDER — FUROSEMIDE 40 MG/1
40 TABLET ORAL 2 TIMES DAILY
Qty: 180 TABLET | Refills: 3 | Status: SHIPPED | OUTPATIENT
Start: 2024-02-29 | End: 2025-02-28

## 2024-02-29 NOTE — PROGRESS NOTES
TRANSPLANT NUTRITIONAL ASSESSMENT    Referring Provider: Queta Swanson MD      Reason for Visit: CHF diet education    Age: 79 y.o.  Sex: female    Patient Active Problem List   Diagnosis    Enlarged ovary    Type 2 diabetes mellitus    Severe persistent asthma with acute exacerbation    HLD (hyperlipidemia)    GERD (gastroesophageal reflux disease)    Chronic mesenteric ischemia    Mesenteric artery insufficiency    Gastroesophageal reflux disease    Essential hypertension    Pure hypercholesterolemia    Persistent atrial fibrillation    Old lacunar stroke without late effect    COPD exacerbation    Constipation    Aortic stenosis    Umbilical hernia without obstruction and without gangrene    Incisional hernia, without obstruction or gangrene    COPD (chronic obstructive pulmonary disease)    Costochondritis    Diabetic polyneuropathy associated with type 2 diabetes mellitus    DDD (degenerative disc disease), lumbar    Cataract    Low back pain    Difficulty walking    Muscle weakness    Balance problems    Decreased range of motion of trunk and back    Lumbar spondylosis    Neuroforaminal stenosis of lumbar spine    Spinal stenosis of lumbar region with neurogenic claudication    Lumbar radiculopathy    S/P TAVR (transcatheter aortic valve replacement)    Obesity    Vasovagal syncope    TACOS (acute kidney injury)    Headache    Atrial flutter    Hyponatremia    Leukocytosis    Dysuria    Comfort measures only status    Acute cystitis with hematuria    Other chest pain    CAD (coronary artery disease)    Asthma in adult    Chronic heart failure with preserved ejection fraction    Chronic atrial fibrillation    BMI 40.0-44.9, adult    Localized edema    History of transcatheter aortic valve replacement (TAVR)    Coronary artery disease involving native coronary artery of native heart without angina pectoris    Aortic atherosclerosis    Numbness and tingling    Seizure-like activity    Occlusion of superior  mesenteric artery    Pacemaker    Chest discomfort    Acute on chronic systolic heart failure    Morbid obesity     Past Medical History:   Diagnosis Date    Acute on chronic diastolic (congestive) heart failure 11/20/2019    Anticoagulant long-term use     on Plavix since May 2015    Anxiety     Arthritis     Asthma     Atrial fibrillation     Atrial fibrillation with RVR 10/19/2020    Cataracts, bilateral     Chest pain, atypical     Chronic atrial fibrillation with rapid ventricular response 6/23/2017    Colon polyp     Coronary artery disease     Diabetes mellitus     Dry eyes     Esophageal erosions     GERD (gastroesophageal reflux disease)     Heart murmur     Hemorrhoid     High cholesterol     Hypertension     Irritable bowel syndrome     Paroxysmal atrial fibrillation 10/30/2020    Persistent atrial fibrillation 2/10/2020    Shingles 2015    Stenosis of aortic and mitral valves     Stroke     TIA (transient ischemic attack)     Use of cane as ambulatory aid      Lab Results   Component Value Date     (H) 02/29/2024    K 3.8 02/29/2024    PHOS 4.1 02/29/2024    MG 1.6 02/29/2024    CHOL 149 03/22/2023    HDL 63 03/22/2023    TRIG 118 03/22/2023    ALBUMIN 3.9 02/29/2024    PREALBUMIN 23 04/21/2011    HGBA1C 5.0 02/09/2024    CALCIUM 9.8 02/29/2024     Other Pertinent Labs: Cl: 94 (L), CO2: 32 (H), BUN:36 (H), Alk Phos: 38 (L), GFR: 50.5 (L)    Current Outpatient Medications   Medication Sig    acetaminophen (TYLENOL) 650 MG TbSR Take 1,300 mg by mouth 2 (two) times daily as needed.    albuterol-ipratropium (DUO-NEB) 2.5 mg-0.5 mg/3 mL nebulizer solution Take 3 mLs by nebulization every 4 (four) hours as needed for Wheezing or Shortness of Breath.    apixaban (ELIQUIS) 5 mg Tab TAKE 1 TABLET BY MOUTH TWICE A DAY    ascorbic acid, vitamin C, (VITAMIN C) 500 MG tablet Take 1,000 mg by mouth once daily.     atorvastatin (LIPITOR) 10 MG tablet Take 1 tablet (10 mg total) by mouth once daily.    calcium  carbonate/vitamin D3 (CALTRATE 600 + D ORAL) Take 1 tablet by mouth once daily.    clopidogreL (PLAVIX) 75 mg tablet TAKE 1 TABLET BY MOUTH EVERY DAY    DULoxetine (CYMBALTA) 20 MG capsule Take 1 capsule (20 mg total) by mouth once daily.    empagliflozin (JARDIANCE) 10 mg tablet Take 1 tablet (10 mg total) by mouth once daily.    fexofenadine (ALLEGRA) 60 MG tablet Take 60 mg by mouth once daily.    fish oil-omega-3 fatty acids 300-1,000 mg capsule Take 1 g by mouth once daily.     furosemide (LASIX) 40 MG tablet Take 40mg (1 tablet) in the morning and 20mg (1/2 tablet) in the afternoon    gabapentin (NEURONTIN) 600 MG tablet Take 600 mg by mouth 3 (three) times daily.    isosorbide mononitrate (IMDUR) 60 MG 24 hr tablet Take 1 tablet (60 mg total) by mouth once daily.    LORazepam (ATIVAN) 0.5 MG tablet Take 1 tablet (0.5 mg total) by mouth every morning.    losartan (COZAAR) 50 MG Tab Take 1 tablet (50 mg total) by mouth once daily.    metFORMIN (GLUCOPHAGE) 500 MG tablet Take 1 tablet (500 mg total) by mouth 2 (two) times daily.    montelukast (SINGULAIR) 10 mg tablet Take 10 mg by mouth once daily.    mv-mn/folic ac/calcium/vit K1 (WOMEN'S 50 PLUS MULTIVITAMIN ORAL) Take 1 tablet by mouth once daily.    nitroGLYCERIN (NITROSTAT) 0.4 MG SL tablet Place 1 tablet (0.4 mg total) under the tongue every 5 (five) minutes as needed for Chest pain.    pantoprazole (PROTONIX) 40 MG tablet Take 1 tablet (40 mg total) by mouth once daily.    potassium gluconate 550 mg (90 mg) Tab Take 180 mg by mouth 2 (two) times a day.    ranolazine (RANEXA) 1,000 mg Tb12 Take 1 tablet (1,000 mg total) by mouth 2 (two) times daily.    spironolactone (ALDACTONE) 25 MG tablet Take 1 tablet (25 mg total) by mouth once daily.    SYMBICORT 160-4.5 mcg/actuation HFAA Inhale 1 puff into the lungs 2 (two) times daily.     No current facility-administered medications for this visit.     Facility-Administered Medications Ordered in Other Visits  "  Medication    0.9%  NaCl infusion    sodium chloride 0.9% flush 5 mL     Allergies: Celebrex [celecoxib], Ciprofloxacin, Dicyclomine, Adhesive, Avelox [moxifloxacin], Cilostazol, Eryc [erythromycin], Iodine and iodide containing products, Keflex [cephalexin], Meclomen, Meloxicam, Metoclopramide, and Sulfa (sulfonamide antibiotics)    Ht Readings from Last 1 Encounters:   02/29/24 5' 3" (1.6 m)     Wt Readings from Last 1 Encounters:   02/29/24 97.9 kg (215 lb 13.3 oz)      BMI: 38.23 kg/m²     Usual Weight: 108 kg (238 LBS)   Weight Change/Time: Down 21 kg since 10/17  Current Diet: gato / fluid res  Appetite/Current Intake: fair   Exercise/Physical Activity: sedentary   Nutritional/Herbal Supplements: none  Potential Food/Medication Interactions: Lipitor: avoid fatty foods and alcohol, Jardiance: avoid green tea, Aldactone: avoid high K+ intake, Neurontin: avoid grapefruit  Chewing/Swallowing Problems: none  Symptoms: none  Assessment of Lab Values: t2dm , cardiac dysfunction   Support System:  / daughter     ABW: 150 lbs (68 kg)  Estimated Kcal Need: 1704 kcals (25 kcals/kg abw)  Estimated Protein Need: 68 (1.0 g/kg abw)     Nutritional History: 3 meals per day. Breakfast is oatmeal. Lunch and dinner are [employer] meals or baked fish/chicken with squash, cauliflower, or brussels sprouts. Pt drinks over 60 oz of fluid per day     Nutritional Diagnoses  Problem: excessive fluid intake  Etiology: cardiac dysfunction  Symptoms: nutr hist     Educational Need? yes  Barriers: none identified  Discussed with: patient and spouse  Interventions: Patient taught nutrition information regarding cardiac diet/ fluid res   .  Pt was educated on how to read nutrition labels to understand food has natural sodium present. Recc'd of 600mg Na per meal. Pt recc drink no more than 2000ml  but more than 1000ml per day. pt given contact info if questions or needs arise   Goals/Recommendations: diet adherence and " limit fluid intake  Actions Taken: instruct/provide written information  Strategies Used: problem solving, goal setting, motivational interviewing  Patient and/or family comprehend instructions: yes , adherence expected  Outcome: Verbalizes understanding  Monitoring: Contact information provided, will f/u in clinic and communicate with the care team as needed.      Counseling Time: 15 minutes

## 2024-03-01 NOTE — TELEPHONE ENCOUNTER
[facility] RN contacted pt to give lab results and updated plan of care abased on recommendations by SILVIA as follows:    Pt labs are stable, she is to restart 40 mg Lasix BID on 3/1/2024. SILVIA recommends RTC in 2 weeks. Pt verbalized understanding of plan of care changes, appt scheduled for March 14 2024 at 11:30 AM.

## 2024-03-08 ENCOUNTER — TELEPHONE (OUTPATIENT)
Dept: CARDIOLOGY | Facility: CLINIC | Age: 79
End: 2024-03-08
Payer: MEDICARE

## 2024-03-08 NOTE — TELEPHONE ENCOUNTER
"[facility]) weekly phone follow up / triage call completed.     TCC RN Navigator spoke with PT    Current Patient reported weight: 213.8 lbs   Patient Goal Weight: (214.0 lbs)  Recent Patient reported blood pressure and heart rate:  B-136/61 P-60    Pt reports the following:  []  Shortness of Breath with Activity  []  Shortness of Breath at rest   []  Fatigue  []  Edema   [] Chest pain or tightness  [] Weight Increase since discharge  [x] None of the above    Pt reports using "Daily weight and symptom tracker".    Pt reports being in the GREEN(color) Zone. If in yellow/red, reminded that they should be calling [facility] today or now.     Medications:   Medication compliance reviewed with pt.  Pt reports having medication list available and has all medications at home for use per list.     Education:   Confirmed pt still has "[facility] Home Care Guide"  .  YES    Reminded of key points as listed below.     Recommend 2 -3 gram sodium restriction and 1500 cc-2000 cc fluid restriction.  Encourage physical activity with graded exercise program.  Requested patient to weigh themselves daily, and to notify us if their weight increases by more than 3 lbs in 1 day or 5 lbs in 3 days.   Reminded to use "Daily weight and symptom tracker".  Even if pt does not have a scale, to use symptom tracker.       Watch for these Signs and Symptoms: If any of these occur, contact [facility] immediately:   Increase in shortness of breath with movement   Increase in swelling in your legs and ankles   Weight gain of more than 3 pounds in a day or 5 pounds in 3 days.   Difficulty breathing when you are lying down   Worsening fatigue or tiredness   Stomach bloating, a full feeling or a loss of appetite   Increased coughing--especially when you are lying down      Pt was able to verbalize back to RN in their own words correct diet/fluid restrictions, necessity for exercise, warning signs " and symptoms, when and how to contact their [facility] team.      Pt reminded of upcoming appointment.  PT reports they will attend.  3-14-24 @ 11:  Pt reminded of how and when to contact TC:  [phone] (Mon-Fri, 8a-5p) & for urgent issues on the weekend to page the Heart Transplant MD on call.  Pt also encouraged utilize [facility] as well    Pt  verbalized understanding and in agreement of plan.       Will follow up with pt at next clinic visit and RN navigator available for pt questions, issues or concerns.

## 2024-03-12 NOTE — PROGRESS NOTES
HF [facility] Provider Note (Follow-up) Consult Note      HPI:     Patient wearing 2L supplemental O2 continuously since discharge. Reports stable SOB from prior visit. Using nebulizer more frequently. Completing PT exercises without issue while wearing supplemental O2. Presents to clinic in wheelchair.              Patient now sleeping on 2 number of pillows without wedge pillow. Unable to lay flat at baseline.               Patient wakes up SOB, has to get out of bed, associated cough- denies PND symptoms, endorses intermittent dry cough, worsened at nighttime              Palpitations - intermittent palpitations              Dizzy, light-headed, pre-syncope or syncope- episodes of dizziness/lightheadedness associated with straining during BMs. Denies recent episodes of pre-syncope/syncope              Other information felt pertinent to HPI: Ms. Adriana Strong is a 79 yo female with a PMHx of COPD, HFpEF, CAD, persistent AF s/p DC PPM in 2022 on eliquis, AS s/p TAVR, HLD, HTN, DM2, mesenteric ischemia who presents to second [facility] enrollment following recent admission for ADHF after being admitted from Cardiology clinic. Started on IV lasix and O2 per protocol. Updated Echo revealed new decreased in EF to 35-40% and regional wall motion abnormalities present. Consulted cards who recommended EP for possible pacemaker induced CM. EP evaluated patient and switched her PPM settings to 60 VVI. Rec stopping AVN blockers with plan to re-evaluate patient in 3-4 weeks in clinic. Noted GDMT unlikely to provide benefit if 2/2 PICM. Patient had PET stress test on 2/14. IC consulted for transient ischemic dilation on stress test and concern for triple vessel disease. She underwent LHC on 2/15 which revealed non-obstructive CAD. No stents placed. She was adequately diuresed and deemed appropriate for d/c. Advised to follow up with EP (Dr. Moeller) in 3-4 weeks.        PHYSICAL:   Vitals:    03/14/24 1200   BP: (!) 148/65   Pulse: 60    Resp: 12        @[id number](3)@    JVD: no  Heart rhythm: regular  Cardiac murmur: No    S3: no  S4: no  Lungs: crackles in posterior lung bases  Hepatojugular reflux: no  Edema: yes, 1+ BLE     Lab Results   Component Value Date     03/14/2024    K 4.1 03/14/2024    MG 1.7 03/14/2024    CL 99 03/14/2024    CO2 29 03/14/2024    BUN 21 03/14/2024    CREATININE 1.1 03/14/2024     (H) 03/14/2024    CALCIUM 9.3 03/14/2024    AST 16 03/14/2024    ALT 12 03/14/2024    ALBUMIN 3.7 03/14/2024    PROT 7.0 03/14/2024    BILITOT 0.4 03/14/2024     Lab Results   Component Value Date     (H) 03/14/2024     (H) 02/29/2024     (H) 02/21/2024       ASSESSMENT: Chronic diastolic HF     PLAN:      Patient Instructions:   Instruct the patient to notify this clinic if HH, a physician or an advanced care provider wants to change medication one of their HF medications   Activity and Diet restrictions:   Recommend 2-3 gram sodium restriction and 1500cc- 2000cc fluid restriction.  Encourage physical activity with graded exercise program.  Requested patient to weigh themselves daily, and to notify us if their weight increases by more than 3 lbs in 1 day or 5 lbs in 3 days.    Assigned dry weight on home scale: 214lbs  NYHA Class III symptoms. Unfortunately confusion with recommended lasix dosing from last visit. Since last visit she has continued previous lasix dosing of 40mg in the morning and 20mg in the afternoon rather than 40mg BID. Instructed to take lasix 40mg BID as new maintenance prescription.       1 week phone check in with tentative phone dc.     Other diagnostic tests ordered: Will need close follow up with EP for suspected PICM. Holding all izabella blocking agents.     Khloe Andujar PA-C

## 2024-03-14 ENCOUNTER — LAB VISIT (OUTPATIENT)
Dept: LAB | Facility: HOSPITAL | Age: 79
End: 2024-03-14
Payer: MEDICARE

## 2024-03-14 ENCOUNTER — OFFICE VISIT (OUTPATIENT)
Dept: CARDIOLOGY | Facility: CLINIC | Age: 79
End: 2024-03-14
Payer: MEDICARE

## 2024-03-14 VITALS
HEIGHT: 64 IN | BODY MASS INDEX: 36.93 KG/M2 | SYSTOLIC BLOOD PRESSURE: 148 MMHG | HEART RATE: 60 BPM | DIASTOLIC BLOOD PRESSURE: 65 MMHG | WEIGHT: 216.31 LBS | OXYGEN SATURATION: 97 % | RESPIRATION RATE: 12 BRPM

## 2024-03-14 DIAGNOSIS — E78.5 HYPERLIPIDEMIA, UNSPECIFIED HYPERLIPIDEMIA TYPE: ICD-10-CM

## 2024-03-14 DIAGNOSIS — Z95.2 S/P TAVR (TRANSCATHETER AORTIC VALVE REPLACEMENT): ICD-10-CM

## 2024-03-14 DIAGNOSIS — I10 ESSENTIAL HYPERTENSION: ICD-10-CM

## 2024-03-14 DIAGNOSIS — I35.0 AORTIC VALVE STENOSIS, ETIOLOGY OF CARDIAC VALVE DISEASE UNSPECIFIED: ICD-10-CM

## 2024-03-14 DIAGNOSIS — Z95.0 CARDIAC PACEMAKER IN SITU: ICD-10-CM

## 2024-03-14 DIAGNOSIS — E66.01 MORBID OBESITY: ICD-10-CM

## 2024-03-14 DIAGNOSIS — I25.111 CORONARY ARTERY DISEASE INVOLVING NATIVE CORONARY ARTERY OF NATIVE HEART WITH ANGINA PECTORIS WITH DOCUMENTED SPASM: ICD-10-CM

## 2024-03-14 DIAGNOSIS — J42 CHRONIC BRONCHITIS, UNSPECIFIED CHRONIC BRONCHITIS TYPE: ICD-10-CM

## 2024-03-14 DIAGNOSIS — I48.19 PERSISTENT ATRIAL FIBRILLATION: ICD-10-CM

## 2024-03-14 DIAGNOSIS — R06.02 SOB (SHORTNESS OF BREATH): ICD-10-CM

## 2024-03-14 DIAGNOSIS — I50.20 HFREF (HEART FAILURE WITH REDUCED EJECTION FRACTION): Primary | ICD-10-CM

## 2024-03-14 LAB
ALBUMIN SERPL BCP-MCNC: 3.7 G/DL (ref 3.5–5.2)
ALP SERPL-CCNC: 38 U/L (ref 55–135)
ALT SERPL W/O P-5'-P-CCNC: 12 U/L (ref 10–44)
ANION GAP SERPL CALC-SCNC: 9 MMOL/L (ref 8–16)
AST SERPL-CCNC: 16 U/L (ref 10–40)
BILIRUB SERPL-MCNC: 0.4 MG/DL (ref 0.1–1)
BNP SERPL-MCNC: 247 PG/ML (ref 0–99)
BUN SERPL-MCNC: 21 MG/DL (ref 8–23)
CALCIUM SERPL-MCNC: 9.3 MG/DL (ref 8.7–10.5)
CHLORIDE SERPL-SCNC: 99 MMOL/L (ref 95–110)
CO2 SERPL-SCNC: 29 MMOL/L (ref 23–29)
CREAT SERPL-MCNC: 1.1 MG/DL (ref 0.5–1.4)
EST. GFR  (NO RACE VARIABLE): 50.5 ML/MIN/1.73 M^2
GLUCOSE SERPL-MCNC: 117 MG/DL (ref 70–110)
MAGNESIUM SERPL-MCNC: 1.7 MG/DL (ref 1.6–2.6)
PHOSPHATE SERPL-MCNC: 2.9 MG/DL (ref 2.7–4.5)
POTASSIUM SERPL-SCNC: 4.1 MMOL/L (ref 3.5–5.1)
PROT SERPL-MCNC: 7 G/DL (ref 6–8.4)
SODIUM SERPL-SCNC: 137 MMOL/L (ref 136–145)

## 2024-03-14 PROCEDURE — 83880 ASSAY OF NATRIURETIC PEPTIDE: CPT

## 2024-03-14 PROCEDURE — 83735 ASSAY OF MAGNESIUM: CPT

## 2024-03-14 PROCEDURE — 99999 PR PBB SHADOW E&M-EST. PATIENT-LVL V: CPT | Mod: PBBFAC,,,

## 2024-03-14 PROCEDURE — 99214 OFFICE O/P EST MOD 30 MIN: CPT | Mod: S$GLB,,,

## 2024-03-14 PROCEDURE — 80053 COMPREHEN METABOLIC PANEL: CPT

## 2024-03-14 PROCEDURE — 84100 ASSAY OF PHOSPHORUS: CPT

## 2024-03-14 PROCEDURE — 36415 COLL VENOUS BLD VENIPUNCTURE: CPT

## 2024-03-21 ENCOUNTER — TELEPHONE (OUTPATIENT)
Dept: CARDIOLOGY | Facility: CLINIC | Age: 79
End: 2024-03-21
Payer: MEDICARE

## 2024-03-21 ENCOUNTER — CLINICAL SUPPORT (OUTPATIENT)
Dept: CARDIOLOGY | Facility: HOSPITAL | Age: 79
End: 2024-03-21
Attending: INTERNAL MEDICINE
Payer: MEDICARE

## 2024-03-21 ENCOUNTER — OFFICE VISIT (OUTPATIENT)
Dept: ELECTROPHYSIOLOGY | Facility: CLINIC | Age: 79
End: 2024-03-21
Payer: MEDICARE

## 2024-03-21 VITALS
WEIGHT: 212 LBS | HEART RATE: 60 BPM | HEIGHT: 64 IN | BODY MASS INDEX: 36.19 KG/M2 | SYSTOLIC BLOOD PRESSURE: 146 MMHG | DIASTOLIC BLOOD PRESSURE: 63 MMHG

## 2024-03-21 DIAGNOSIS — I44.2 ATRIOVENTRICULAR BLOCK, COMPLETE: ICD-10-CM

## 2024-03-21 DIAGNOSIS — I50.22 CHRONIC SYSTOLIC HEART FAILURE: ICD-10-CM

## 2024-03-21 DIAGNOSIS — I48.91 ATRIAL FIBRILLATION WITH SLOW VENTRICULAR RESPONSE: ICD-10-CM

## 2024-03-21 DIAGNOSIS — I10 ESSENTIAL HYPERTENSION: ICD-10-CM

## 2024-03-21 DIAGNOSIS — I48.92 ATRIAL FLUTTER, UNSPECIFIED TYPE: ICD-10-CM

## 2024-03-21 DIAGNOSIS — I50.23 ACUTE ON CHRONIC SYSTOLIC HEART FAILURE: ICD-10-CM

## 2024-03-21 DIAGNOSIS — E66.01 SEVERE OBESITY WITH BODY MASS INDEX (BMI) OF 35.0 TO 39.9 WITH COMORBIDITY: ICD-10-CM

## 2024-03-21 DIAGNOSIS — E11.69 TYPE 2 DIABETES MELLITUS WITH OTHER SPECIFIED COMPLICATION, UNSPECIFIED WHETHER LONG TERM INSULIN USE: ICD-10-CM

## 2024-03-21 DIAGNOSIS — I48.21 PERMANENT ATRIAL FIBRILLATION: Primary | ICD-10-CM

## 2024-03-21 DIAGNOSIS — I70.0 AORTIC ATHEROSCLEROSIS: ICD-10-CM

## 2024-03-21 PROBLEM — I48.20 CHRONIC ATRIAL FIBRILLATION: Status: RESOLVED | Noted: 2020-11-12 | Resolved: 2024-03-21

## 2024-03-21 LAB
OHS QRS DURATION: 158 MS
OHS QTC CALCULATION: 482 MS

## 2024-03-21 PROCEDURE — 93005 ELECTROCARDIOGRAM TRACING: CPT | Mod: S$GLB,,, | Performed by: INTERNAL MEDICINE

## 2024-03-21 PROCEDURE — 93280 PM DEVICE PROGR EVAL DUAL: CPT

## 2024-03-21 PROCEDURE — 93010 ELECTROCARDIOGRAM REPORT: CPT | Mod: S$GLB,,, | Performed by: INTERNAL MEDICINE

## 2024-03-21 PROCEDURE — 93280 PM DEVICE PROGR EVAL DUAL: CPT | Mod: 26,,, | Performed by: INTERNAL MEDICINE

## 2024-03-21 PROCEDURE — 99999 PR PBB SHADOW E&M-EST. PATIENT-LVL IV: CPT | Mod: PBBFAC,,, | Performed by: INTERNAL MEDICINE

## 2024-03-21 PROCEDURE — 99215 OFFICE O/P EST HI 40 MIN: CPT | Mod: S$GLB,,, | Performed by: INTERNAL MEDICINE

## 2024-03-21 NOTE — PROGRESS NOTES
Subjective:   Patient ID:  Adriana Strong is a 79 y.o. female who presents for follow-up of Congestive Heart Failure      HPI  I had the pleasure of seeing Mrs. Strong today in our electrophysiology clinic in follow-up for her pacemaker and heart failure. As you are aware she is a pleasant 79 year-old woman with permanent AF with slow ventricular response s/p dcPPM implanted in 2022, hypertension, severe obesity, hypertension, type 2 diabetes mellitus, severe AS s/p TAVR, nonobstructive CAD, and COPD. She presented to the ER in February with heart failure symptoms. LVEF was newly depressed to 35-40% range. Coronary angiogram noted non-obstructive CAD. She was on moderate dosed carvedilol and was RV paced when temporarily set to lower rate of 30 bpm. Her settings on admission was DDD-CLS 60 with AMS base rate of 70. Plan was to stop carvedilol, reprogram her PPM to VVI 60 and reassess in clinic, for which she presents, prior to making a decision on upgrading to CRT-P. In-clinic device check notes 100% RV pacing even when set to VVI 30.    My interpretation of today's in-clinic ECG is AF with RV pacing (paced QRS 160ms)      Review of Systems   Constitutional: Positive for malaise/fatigue. Negative for fever.   HENT:  Negative for congestion and sore throat.    Eyes:  Negative for blurred vision and visual disturbance.   Cardiovascular:  Positive for dyspnea on exertion. Negative for chest pain, irregular heartbeat, near-syncope, palpitations and syncope.   Respiratory:  Negative for cough and shortness of breath.    Hematologic/Lymphatic: Negative for bleeding problem. Does not bruise/bleed easily.   Skin: Negative.    Musculoskeletal: Negative.    Gastrointestinal:  Negative for bloating, abdominal pain, hematochezia and melena.   Neurological:  Negative for focal weakness and weakness.   Psychiatric/Behavioral: Negative.         Objective:   Physical Exam  Vitals reviewed.   Constitutional:       General: She is  not in acute distress.     Appearance: She is well-developed. She is not diaphoretic.   HENT:      Head: Normocephalic and atraumatic.   Eyes:      General:         Right eye: No discharge.         Left eye: No discharge.      Conjunctiva/sclera: Conjunctivae normal.   Cardiovascular:      Rate and Rhythm: Normal rate and regular rhythm.      Heart sounds: No murmur heard.     No friction rub. No gallop.   Pulmonary:      Effort: Pulmonary effort is normal. No respiratory distress.      Breath sounds: Normal breath sounds. No wheezing or rales.   Abdominal:      General: Bowel sounds are normal. There is no distension.      Palpations: Abdomen is soft.      Tenderness: There is no abdominal tenderness.   Musculoskeletal:      Cervical back: Neck supple.   Skin:     General: Skin is warm and dry.   Neurological:      Mental Status: She is alert and oriented to person, place, and time.   Psychiatric:         Behavior: Behavior normal.         Thought Content: Thought content normal.         Judgment: Judgment normal.         Assessment:      1. Permanent atrial fibrillation    2. Aortic atherosclerosis    3. Chronic systolic heart failure    4. Atrial fibrillation with slow ventricular response    5. Type 2 diabetes mellitus with other specified complication, unspecified whether long term insulin use    6. Severe obesity with body mass index (BMI) of 35.0 to 39.9 with comorbidity    7. Essential hypertension    8. Acute on chronic systolic heart failure    9. Atrial flutter, unspecified type        Plan:   In summary, Mrs. Strong is a pleasant 79 year-old woman with permanent AF with slow ventricular response s/p dcPPM implanted in 2022, hypertension, severe obesity, hypertension, type 2 diabetes mellitus, severe AS s/p TAVR, nonobstructive CAD, and COPD. She has a new systolic heart failure likely related to RV pacing. LVEF is 35-40%. RV pacing did not lessen with programming changes and with stopping carvedilol.  Recommend upgrading to CRT-P. We discussed the alternatives, benefits and risks of the procedure including pain, infection, bleeding, injury to lung causing pneumothorax requiring tube placement, injury to heart valves, puncture of the heart leading to pericardial effusion or tamponade requiring tube drainage, heart attack, stroke and death. Discussed addition of lead to the CS as first option. If there are no CS targets would implant a left bundle branch pacing area lead. Discussed need to have venous access available to do this. She has superficial veins along both sides of her chest/arms. Would do a pre-op venogram at a separate earlier date.    Plan  Left arm venogram (contrast prep needed)    At another date plan upgrade to a physiologic pacing system  Biotronik  Anesthesia  Hold eliquis 48 hours prior  Hold losartan/metformin AM of procedure    Thank you for allowing me to participate in the care of this patient. Please do not hesitate to call me with any questions or concerns.    Checnho Moeller MD, PhD  Cardiac Electrophysiology

## 2024-03-22 ENCOUNTER — TELEPHONE (OUTPATIENT)
Dept: CARDIOLOGY | Facility: CLINIC | Age: 79
End: 2024-03-22
Payer: MEDICARE

## 2024-03-22 ENCOUNTER — CLINICAL SUPPORT (OUTPATIENT)
Dept: CARDIOLOGY | Facility: HOSPITAL | Age: 79
End: 2024-03-22
Attending: INTERNAL MEDICINE
Payer: MEDICARE

## 2024-03-22 ENCOUNTER — EPISODE CHANGES (OUTPATIENT)
Dept: CARDIOLOGY | Facility: CLINIC | Age: 79
End: 2024-03-22

## 2024-03-22 DIAGNOSIS — I44.2 ATRIOVENTRICULAR BLOCK, COMPLETE: ICD-10-CM

## 2024-03-22 PROCEDURE — 93296 REM INTERROG EVL PM/IDS: CPT | Performed by: INTERNAL MEDICINE

## 2024-03-22 NOTE — TELEPHONE ENCOUNTER
"[facility]) DISCHARGE VISIT - PHONE     Called and spoke to pt, Ms Adriana and  Mr. Cruz    Most Recent Hospital Discharge Date:  February 16, 2024    Last admission Diagnosis/chief complaint: SOB, BLEE    Pt discharge completed by phone related to pt has had no PRN vivist r/t HF sxs    Dis enroll weight 213 lbs BP: 140/71 HR: 60    Pt reports the following:  []  Shortness of Breath with activity  []  Shortness of Breath at rest   []  Fatigue  []  Edema   [] Chest pain or tightness  [] Weight Increase since discharge  [x] None of the above    Medications:   Medication reconciliation completed today per RN.  Pt reports having all medications available and understands how to take them appropriately. Reminded pt to call prior to making any changes to medications.    Pt denies questions and needs for medications.    Education:   [x] Confirmed pt still has  "Heart Failure Transitional Care Clinic Home Care Guide" .   Reviewed key points as listed below.     Recommend 2 gram sodium restriction and 1500cc fluid restriction.  Encourage physical activity with graded exercise program.  Requested patient to weigh themselves daily, and to notify us if their weight increases by more than 3 lbs in 1 day or 5 lbs in 3 days.     [x] Reviewed completed "Daily Weight and Symptom Tracker".  Reviewed with patient when and how to call [facility]FT[facility] according to "Yellow Zone" and "Red Zone".       Watch for these Signs and Symptoms: If any of these occur, contact [facility] immediately:   Increase in shortness of breath with movement   Increase in swelling in your legs and ankles   Weight gain of more than 3 pounds in a night or 5 pounds in 3 days.   Difficulty breathing when you are lying down   Worsening fatigue or tiredness   Stomach bloating, a full feeling or a loss of appetite   Increased coughing--especially when you are lying down    MyChart and Care Companion:   Patient active on myChart? Yes, " patient uses regularly.    HF TCC Program Plan:  Pt has successfully completed HFT[facility] program.  Pt care to be transferred to  EP/ general cards for long term care.     Pt educated on how to call their offices and how to call [facility] On call in the event of an after hour issue.    PT reminded to continue to follow recommendations made during the [facility] program to include monitoring daily weights, taking medications according to list, following up to appointments per provider recommendations, stop smoking/ start exercising and following a heart friendly low salt, low fluid diet.      Pt was able to verbalize back to RN in their own words correct diet/fluid restrictions, necessity for exercise, warning signs and symptoms, when and how to contact their  Long term care team .      Plan:     Per Epic. Pt reports she with have a venogram procedure and pacemaker placed per EP.  RN to send message to general cardiologist for hospital f/u scheduling    [x]  Discussed upcoming appointments and/or plan for follow-up care with his/her PCP/Cardiology  EP/ General Cards. RN sent message to Dr. Landis staff for appt.         Please refer to provider note for additional details and assessment.

## 2024-03-22 NOTE — TELEPHONE ENCOUNTER
"Heart Failure [facility]itional [facility]    Attempted to call pt to complete 1 week "check in"  and discharge phone call. Unable to reach pt at listed phone numbers.  Was unable to leave message with family nor on voicemail. Voicemail not set up or full. . Busy Signal    Will continue to try to reach patient.    "

## 2024-03-22 NOTE — TELEPHONE ENCOUNTER
"[facility] Failure [facility]    Attempted to call pt to complete 1 week "check in"  and discharge phone call. Unable to reach pt at listed phone numbers.  Was able to leave message on voicemail encouraging pt to return call with [facility] phone number.. Called Home #    Will continue to try to reach patient.    "

## 2024-03-26 ENCOUNTER — TELEPHONE (OUTPATIENT)
Dept: ELECTROPHYSIOLOGY | Facility: CLINIC | Age: 79
End: 2024-03-26
Payer: MEDICARE

## 2024-03-26 LAB
OHS CV AF BURDEN PERCENT: 100
OHS CV DC REMOTE DEVICE TYPE: NORMAL
OHS CV DC REMOTE DEVICE TYPE: NORMAL
OHS CV RV PACING PERCENT: 100 %

## 2024-03-26 NOTE — TELEPHONE ENCOUNTER
Attempted to contact patient to discuss procedure scheduling for Venogram and Device Upgrade, but no answer received. Voicemail left on both home and mobile number requesting return call.

## 2024-03-27 ENCOUNTER — TELEPHONE (OUTPATIENT)
Dept: ELECTROPHYSIOLOGY | Facility: CLINIC | Age: 79
End: 2024-03-27
Payer: MEDICARE

## 2024-03-27 NOTE — TELEPHONE ENCOUNTER
----- Message from Joana Garcias sent at 3/26/2024  5:18 PM CDT -----  Type:  Patient Returning Call    Who Called:pt  Who Left Message for Patient:office  Does the patient know what this is regarding?:procedure  Would the patient rather a call back or a response via [facility]? call  Best Call Back Number:[phone]  Additional Information:

## 2024-03-27 NOTE — TELEPHONE ENCOUNTER
----- Message from Clarissa Bowman sent at 3/26/2024  5:42 PM CDT -----  Type:  Patient Returning Call    Who Called:pt  Who Left Message for Patient:pt  Does the patient know what this is regarding?:pt say she just had a missed call  she was calling @ 5:40  Would the patient rather a call back or a response via [facility]? call  Best Call Back Number [phone]  Additional Information: call back

## 2024-03-27 NOTE — TELEPHONE ENCOUNTER
Spoke with Patient . Scheduled for Venogram on 5/1/2024 and Device upgrade on 5/9/24 with Dr Moeller. Procedure details reviewed and advised that pre procedure patient instructions will be sent via patient portal  and Mail as requested. Advised to call the office for any questions or concerns prior to scheduled procedure. Understanding verbalized.

## 2024-03-28 ENCOUNTER — PATIENT MESSAGE (OUTPATIENT)
Dept: ELECTROPHYSIOLOGY | Facility: CLINIC | Age: 79
End: 2024-03-28
Payer: MEDICARE

## 2024-03-28 DIAGNOSIS — I10 ESSENTIAL HYPERTENSION: ICD-10-CM

## 2024-03-28 DIAGNOSIS — Z79.01 CHRONIC ANTICOAGULATION: ICD-10-CM

## 2024-03-28 DIAGNOSIS — I48.91 ATRIAL FIBRILLATION WITH SLOW VENTRICULAR RESPONSE: ICD-10-CM

## 2024-03-28 DIAGNOSIS — Z95.0 CARDIAC PACEMAKER IN SITU: ICD-10-CM

## 2024-03-28 DIAGNOSIS — I50.23 ACUTE ON CHRONIC SYSTOLIC HEART FAILURE: Primary | ICD-10-CM

## 2024-03-28 DIAGNOSIS — I50.22 CHRONIC SYSTOLIC HEART FAILURE: ICD-10-CM

## 2024-03-28 DIAGNOSIS — Z01.818 PRE-OP TESTING: ICD-10-CM

## 2024-03-29 RX ORDER — PREDNISONE 50 MG/1
TABLET ORAL
Qty: 3 TABLET | Refills: 0 | Status: SHIPPED | OUTPATIENT
Start: 2024-03-29 | End: 2024-05-01 | Stop reason: SDUPTHER

## 2024-03-29 RX ORDER — DIPHENHYDRAMINE HCL 25 MG
50 CAPSULE ORAL
Qty: 6 CAPSULE | Refills: 0 | COMMUNITY
Start: 2024-03-29 | End: 2024-05-01 | Stop reason: SDUPTHER

## 2024-03-29 RX ORDER — CIMETIDINE 300 MG/1
TABLET, FILM COATED ORAL
Qty: 3 TABLET | Refills: 0 | Status: SHIPPED | OUTPATIENT
Start: 2024-03-29 | End: 2024-05-01 | Stop reason: SDUPTHER

## 2024-04-26 ENCOUNTER — LAB VISIT (OUTPATIENT)
Dept: LAB | Facility: HOSPITAL | Age: 79
End: 2024-04-26
Attending: INTERNAL MEDICINE
Payer: MEDICARE

## 2024-04-26 DIAGNOSIS — I10 ESSENTIAL HYPERTENSION: ICD-10-CM

## 2024-04-26 DIAGNOSIS — Z79.01 CHRONIC ANTICOAGULATION: ICD-10-CM

## 2024-04-26 DIAGNOSIS — I48.91 ATRIAL FIBRILLATION WITH SLOW VENTRICULAR RESPONSE: ICD-10-CM

## 2024-04-26 DIAGNOSIS — I50.23 ACUTE ON CHRONIC SYSTOLIC HEART FAILURE: ICD-10-CM

## 2024-04-26 DIAGNOSIS — Z01.818 PRE-OP TESTING: ICD-10-CM

## 2024-04-26 LAB
ANION GAP SERPL CALC-SCNC: 10 MMOL/L (ref 8–16)
APTT PPP: 40.3 SEC (ref 21–32)
BASOPHILS # BLD AUTO: 0.04 K/UL (ref 0–0.2)
BASOPHILS NFR BLD: 0.5 % (ref 0–1.9)
CALCIUM SERPL-MCNC: 9.4 MG/DL (ref 8.7–10.5)
CHLORIDE SERPL-SCNC: 97 MMOL/L (ref 95–110)
CO2 SERPL-SCNC: 31 MMOL/L (ref 23–29)
CREAT SERPL-MCNC: 0.75 MG/DL (ref 0.5–1.4)
DIFFERENTIAL METHOD BLD: ABNORMAL
EOSINOPHIL # BLD AUTO: 0 K/UL (ref 0–0.5)
EOSINOPHIL NFR BLD: 0.2 % (ref 0–8)
ERYTHROCYTE [DISTWIDTH] IN BLOOD BY AUTOMATED COUNT: 15.4 % (ref 11.5–14.5)
EST. GFR  (NO RACE VARIABLE): >60 ML/MIN/1.73 M^2
GLUCOSE SERPL-MCNC: 100 MG/DL (ref 70–110)
HCT VFR BLD AUTO: 35.8 % (ref 37–48.5)
HGB BLD-MCNC: 11.8 G/DL (ref 12–16)
IMM GRANULOCYTES # BLD AUTO: 0.04 K/UL (ref 0–0.04)
IMM GRANULOCYTES NFR BLD AUTO: 0.5 % (ref 0–0.5)
INR PPP: 1.1 (ref 0.8–1.2)
LYMPHOCYTES # BLD AUTO: 2 K/UL (ref 1–4.8)
LYMPHOCYTES NFR BLD: 22.3 % (ref 18–48)
MCH RBC QN AUTO: 31.6 PG (ref 27–31)
MCHC RBC AUTO-ENTMCNC: 33 G/DL (ref 32–36)
MCV RBC AUTO: 96 FL (ref 82–98)
MONOCYTES # BLD AUTO: 0.7 K/UL (ref 0.3–1)
MONOCYTES NFR BLD: 7.4 % (ref 4–15)
NEUTROPHILS # BLD AUTO: 6.1 K/UL (ref 1.8–7.7)
NEUTROPHILS NFR BLD: 69.1 % (ref 38–73)
NRBC BLD-RTO: 0 /100 WBC
PLATELET # BLD AUTO: 245 K/UL (ref 150–450)
PMV BLD AUTO: 10.6 FL (ref 9.2–12.9)
POTASSIUM SERPL-SCNC: 4.2 MMOL/L (ref 3.5–5.1)
PROTHROMBIN TIME: 11.5 SEC (ref 9–12.5)
RBC # BLD AUTO: 3.73 M/UL (ref 4–5.4)
SODIUM SERPL-SCNC: 138 MMOL/L (ref 136–145)
UUN UR-MCNC: 28 MG/DL (ref 7–17)
WBC # BLD AUTO: 8.76 K/UL (ref 3.9–12.7)

## 2024-04-26 PROCEDURE — 36415 COLL VENOUS BLD VENIPUNCTURE: CPT | Mod: PN | Performed by: INTERNAL MEDICINE

## 2024-04-26 PROCEDURE — 85610 PROTHROMBIN TIME: CPT | Mod: PN | Performed by: INTERNAL MEDICINE

## 2024-04-26 PROCEDURE — 85025 COMPLETE CBC W/AUTO DIFF WBC: CPT | Mod: PN | Performed by: INTERNAL MEDICINE

## 2024-04-26 PROCEDURE — 80048 BASIC METABOLIC PNL TOTAL CA: CPT | Mod: PN | Performed by: INTERNAL MEDICINE

## 2024-04-26 PROCEDURE — 85730 THROMBOPLASTIN TIME PARTIAL: CPT | Mod: PN | Performed by: INTERNAL MEDICINE

## 2024-04-29 DIAGNOSIS — I48.19 PERSISTENT ATRIAL FIBRILLATION: ICD-10-CM

## 2024-04-30 ENCOUNTER — PATIENT MESSAGE (OUTPATIENT)
Dept: ELECTROPHYSIOLOGY | Facility: CLINIC | Age: 79
End: 2024-04-30
Payer: MEDICARE

## 2024-04-30 ENCOUNTER — TELEPHONE (OUTPATIENT)
Dept: ELECTROPHYSIOLOGY | Facility: CLINIC | Age: 79
End: 2024-04-30
Payer: MEDICARE

## 2024-04-30 NOTE — TELEPHONE ENCOUNTER
Spoke to Pateint    CONFIRMED procedure arrival time of 2:30 pm for venogram    Reiterated instructions including:    -Directions to check in desk    -NPO after midnight night prior to procedure. Fasting upon arrival to the hospital the day of the procedure.     -High importance of HOLDING Metformin the morning of the procedure.    -Pre-procedure LABS reviewed with no alerts noted     -Confirmed absence or presence of implanted device/stimulator-  BIO dcPPM in situ.    -Confirmed no recent or current fever, cough, or shortness of breath .    -Confirmed Contrast Prep Instructions of taking Benadryl 50 mg, Tagamet 300 mg, and Prednisone 50 mg 13 hrs, 7 hrs, and 1 hr prior to procedure.    Patient verbalized understanding of above, denies any further questions and appreciated the call.

## 2024-04-30 NOTE — TELEPHONE ENCOUNTER
----- Message from Zofia Ellsworth MA sent at 4/30/2024  3:52 PM CDT -----    ----- Message -----  From: Fadumo Ingram  Sent: 4/30/2024   1:18 PM CDT  To: Sajan HERNANDEZ Staff    Name of Who is Calling: Pt        What is the request in detail: Pt would like a callback from [facility] in regards to additional question for prep medication for upcoming procedure. Please advise thank you                                                   Can the clinic reply by [facility]: Call        What Number to Call Back [facility] not [facility]:..Telephone Information:  Mobile          [phone]

## 2024-04-30 NOTE — TELEPHONE ENCOUNTER
Call returned. Patient wanted to know if she would have another Rx for contrast prep sent to the pharmacy if able to proceed with her device upgrade procedure on 5/9/2024. Advised that once the venogram is complete and it is confirmed that we will proceed with her tentatively scheduled device upgrade procedure on 5/9/2024, I will send the prescription for the contrast prep. Understanding verbalized.

## 2024-05-01 ENCOUNTER — PATIENT MESSAGE (OUTPATIENT)
Dept: ELECTROPHYSIOLOGY | Facility: CLINIC | Age: 79
End: 2024-05-01

## 2024-05-01 ENCOUNTER — HOSPITAL ENCOUNTER (OUTPATIENT)
Facility: HOSPITAL | Age: 79
Discharge: HOME | End: 2024-05-01
Attending: INTERNAL MEDICINE | Admitting: INTERNAL MEDICINE
Payer: MEDICARE

## 2024-05-01 VITALS
WEIGHT: 213 LBS | OXYGEN SATURATION: 98 % | BODY MASS INDEX: 36.37 KG/M2 | RESPIRATION RATE: 20 BRPM | HEART RATE: 62 BPM | TEMPERATURE: 99 F | HEIGHT: 64 IN | DIASTOLIC BLOOD PRESSURE: 74 MMHG | SYSTOLIC BLOOD PRESSURE: 162 MMHG

## 2024-05-01 DIAGNOSIS — Z01.818 PRE-OP TESTING: ICD-10-CM

## 2024-05-01 DIAGNOSIS — I48.21 PERMANENT ATRIAL FIBRILLATION: ICD-10-CM

## 2024-05-01 DIAGNOSIS — Z95.0 CARDIAC PACEMAKER IN SITU: ICD-10-CM

## 2024-05-01 DIAGNOSIS — Z79.01 CHRONIC ANTICOAGULATION: ICD-10-CM

## 2024-05-01 DIAGNOSIS — I48.19 ATRIAL FIBRILLATION, PERSISTENT: ICD-10-CM

## 2024-05-01 DIAGNOSIS — I48.91 A-FIB: ICD-10-CM

## 2024-05-01 DIAGNOSIS — I50.23 ACUTE ON CHRONIC SYSTOLIC HEART FAILURE: ICD-10-CM

## 2024-05-01 DIAGNOSIS — I10 ESSENTIAL HYPERTENSION: ICD-10-CM

## 2024-05-01 DIAGNOSIS — I50.22 CHRONIC SYSTOLIC HEART FAILURE: ICD-10-CM

## 2024-05-01 LAB — POCT GLUCOSE: 177 MG/DL (ref 70–110)

## 2024-05-01 PROCEDURE — 36005 INJECTION EXT VENOGRAPHY: CPT | Mod: LT | Performed by: INTERNAL MEDICINE

## 2024-05-01 PROCEDURE — 75820 VEIN X-RAY ARM/LEG: CPT | Mod: 26,LT,, | Performed by: INTERNAL MEDICINE

## 2024-05-01 PROCEDURE — 82962 GLUCOSE BLOOD TEST: CPT | Performed by: INTERNAL MEDICINE

## 2024-05-01 PROCEDURE — 75820 VEIN X-RAY ARM/LEG: CPT | Mod: LT | Performed by: INTERNAL MEDICINE

## 2024-05-01 PROCEDURE — 93010 ELECTROCARDIOGRAM REPORT: CPT | Mod: ,,, | Performed by: INTERNAL MEDICINE

## 2024-05-01 PROCEDURE — 93005 ELECTROCARDIOGRAM TRACING: CPT | Mod: 59

## 2024-05-01 PROCEDURE — 36005 INJECTION EXT VENOGRAPHY: CPT | Mod: LT,,, | Performed by: INTERNAL MEDICINE

## 2024-05-01 RX ORDER — ONDANSETRON 8 MG/1
8 TABLET, ORALLY DISINTEGRATING ORAL EVERY 8 HOURS PRN
Status: DISCONTINUED | OUTPATIENT
Start: 2024-05-01 | End: 2024-05-01 | Stop reason: HOSPADM

## 2024-05-01 RX ORDER — ACETAMINOPHEN 325 MG/1
650 TABLET ORAL EVERY 4 HOURS PRN
Status: DISCONTINUED | OUTPATIENT
Start: 2024-05-01 | End: 2024-05-01 | Stop reason: HOSPADM

## 2024-05-01 NOTE — HOSPITAL COURSE
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  Outpatient follow up with Dr. Moeller

## 2024-05-01 NOTE — H&P
[facility], Jas Strong  YOB: 1945  Medical Record Number:  [id number]  Attending Physician:  Chencho Moeller MD   Current Principal Problem:  <principal problem not specified>    History     Cc: Venogram    HPI  79 year-old woman with permanent AF with slow ventricular response s/p dcPPM implanted in 2022, hypertension, severe obesity, hypertension, type 2 diabetes mellitus, severe AS s/p TAVR, nonobstructive CAD, and COPD. She presented to the ER in February with heart failure symptoms. LVEF was newly depressed to 35-40% range. Coronary angiogram noted non-obstructive CAD. She was on moderate dosed carvedilol and was RV paced when temporarily set to lower rate of 30 bpm. Her settings on admission was DDD-CLS 60 with AMS base rate of 70. Plan was to stop carvedilol, reprogram her PPM to VVI 60 and reassess in clinic, for which she presents, prior to making a decision on upgrading to CRT-P. In-clinic device check notes 100% RV pacing even when set to VVI 30.   Currently presents for venogram    Dysphagia or odynophagia:  No  Liver Disease, esophageal disease, or known varices:  No  Upper GI Bleeding: No  Snoring:  No  Sleep Apnea:  No  Prior neck surgery or radiation:  No  History of anesthetic difficulties:  No  Family history of anesthetic difficulties:  No  Last oral intake: yesterday before midnight  Able to move neck in all directions:  Yes    Medications - Outpatient  Prior to Admission medications    Medication Sig Start Date End Date Taking? Authorizing Provider   acetaminophen (TYLENOL) 650 MG TbSR Take 1,300 mg by mouth 2 (two) times daily as needed.   Yes Provider, Historical   albuterol-ipratropium (DUO-NEB) 2.5 mg-0.5 mg/3 mL nebulizer solution Take 3 mLs by nebulization every 4 (four) hours as needed for Wheezing or Shortness of Breath. 10/26/20  Yes Provider, Historical   apixaban (ELIQUIS) 5 mg Tab TAKE 1 TABLET BY MOUTH TWICE A DAY 5/9/23  Yes Kodi  Rancho CHANDLER MD   ascorbic acid, vitamin C, (VITAMIN C) 500 MG tablet Take 1,000 mg by mouth once daily.     Provider, Historical   atorvastatin (LIPITOR) 10 MG tablet Take 1 tablet (10 mg total) by mouth once daily. 9/26/23 9/25/24  Larry Agee MD   calcium carbonate/vitamin D3 (CALTRATE 600 + D ORAL) Take 1 tablet by mouth once daily.    Provider, Historical   cimetidine (TAGAMET) 300 MG tablet Take 300 mg  by mouth  13 hours prior to your procedure (at 1 am) ;  then again 7 hours prior ( at 7 am)  , and again 1 hour prior ( at 1:30 pm ) 3/29/24   Chencho Moeller MD   clopidogreL (PLAVIX) 75 mg tablet TAKE 1 TABLET BY MOUTH EVERY DAY 9/5/23   Oli Jeff MD   diphenhydrAMINE (BENADRYL) 25 mg capsule Take 2 capsules (50 mg total) by mouth as needed (Take 50mg at 13 hours, 7 hours, and 1 hour before procedure). Take 50 mg ( 2 tablets ) by mouth 13 hours prior to your procedure (at 1 am) ;  then again 7 hours prior ( at 7 am)  , and again 1 hour prior ( at 1:30 pm ) 3/29/24   Chencho Moeller MD   DULoxetine (CYMBALTA) 20 MG capsule Take 1 capsule (20 mg total) by mouth once daily. 9/26/23 9/25/24  Larry Agee MD   empagliflozin (JARDIANCE) 10 mg tablet Take 1 tablet (10 mg total) by mouth once daily. 10/2/23 10/1/24  Khloe Andujar PA-C   fexofenadine (ALLEGRA) 60 MG tablet Take 60 mg by mouth once daily.    Provider, Historical   fish oil-omega-3 fatty acids 300-1,000 mg capsule Take 1 g by mouth once daily.     Provider, Historical   furosemide (LASIX) 40 MG tablet Take 1 tablet (40 mg total) by mouth 2 (two) times a day. 2/29/24 2/28/25  Khloe Andujar PA-C   gabapentin (NEURONTIN) 600 MG tablet Take 600 mg by mouth 3 (three) times daily.    Provider, Historical   isosorbide mononitrate (IMDUR) 60 MG 24 hr tablet Take 1 tablet (60 mg total) by mouth once daily. 9/26/23 9/25/24  Larry Agee MD   LORazepam (ATIVAN) 0.5 MG tablet Take 1 tablet (0.5 mg total) by mouth every morning. 8/12/22  2/11/24  Ruddy Funes MD   losartan (COZAAR) 50 MG Tab Take 1 tablet (50 mg total) by mouth once daily. 12/18/23 12/17/24  Nate Landis MD   metFORMIN (GLUCOPHAGE) 500 MG tablet Take 1 tablet (500 mg total) by mouth 2 (two) times daily. 9/26/23 9/25/24  Larry Agee MD   montelukast (SINGULAIR) 10 mg tablet Take 10 mg by mouth once daily. 3/4/21   Provider, Historical   mv-mn/folic ac/calcium/vit K1 (WOMEN'S 50 PLUS MULTIVITAMIN ORAL) Take 1 tablet by mouth once daily.    Provider, Historical   nitroGLYCERIN (NITROSTAT) 0.4 MG SL tablet Place 1 tablet (0.4 mg total) under the tongue every 5 (five) minutes as needed for Chest pain. 8/12/22 2/11/24  Ruddy Funes MD   pantoprazole (PROTONIX) 40 MG tablet Take 1 tablet (40 mg total) by mouth once daily. 8/12/22   Ruddy Funes MD   predniSONE (DELTASONE) 50 MG Tab Take 50 mg ( 1 tablet ) by mouth 13 hours prior to your procedure (at 1 am) ;  then again 7 hours prior ( at 7 am)  , and again 1 hour prior ( at 1:30 pm ) 3/29/24   Chencho Moeller MD   ranolazine (RANEXA) 1,000 mg Tb12 Take 1 tablet (1,000 mg total) by mouth 2 (two) times daily. 10/19/23 10/18/24  Nate Landis MD   spironolactone (ALDACTONE) 25 MG tablet Take 1 tablet (25 mg total) by mouth once daily. 2/21/24 2/20/25  Khloe Andujar PA-C   SYMBICORT 160-4.5 mcg/actuation HFAA Inhale 1 puff into the lungs 2 (two) times daily. 5/20/22   Provider, Historical       Medications - Current  Scheduled Meds:  Continuous Infusions:    Allergies  Review of patient's allergies indicates:   Allergen Reactions    Celebrex [celecoxib] Shortness Of Breath    Ciprofloxacin Swelling     lip    Dicyclomine Hives    Adhesive Dermatitis    Avelox [moxifloxacin] Swelling    Cilostazol Other (See Comments)     Elevates blood pressure    Eryc [erythromycin] Other (See Comments)     unknown    Iodine and iodide containing products Hives    Keflex [cephalexin] Hives    Meclomen      rashes    Meloxicam      Ear ringing     Metoclopramide Other (See Comments)     High blood pressure    Sulfa (sulfonamide antibiotics) Itching     Past Medical History  Past Medical History:   Diagnosis Date    Acute on chronic diastolic (congestive) heart failure 11/20/2019    Anticoagulant long-term use     on Plavix since May 2015    Anxiety     Arthritis     Asthma     Atrial fibrillation     Atrial fibrillation with RVR 10/19/2020    Cataracts, bilateral     Chest pain, atypical     Chronic atrial fibrillation with rapid ventricular response 6/23/2017    Colon polyp     Coronary artery disease     Diabetes mellitus     Dry eyes     Esophageal erosions     GERD (gastroesophageal reflux disease)     Heart murmur     Hemorrhoid     High cholesterol     Hypertension     Irritable bowel syndrome     Paroxysmal atrial fibrillation 10/30/2020    Persistent atrial fibrillation 2/10/2020    Shingles 2015    Stenosis of aortic and mitral valves     Stroke     TIA (transient ischemic attack)     Use of cane as ambulatory aid      Past Surgical History  Past Surgical History:   Procedure Laterality Date    ABDOMINAL SURGERY      stents to SMA    angiocele      2007, 2014    AORTIC VALVE REPLACEMENT N/A 11/19/2019    Procedure: Replacement-valve-aortic;  Surgeon: Jack Capone MD;  Location: [facility] CATH LAB;  Service: Cardiology;  Laterality: N/A;    BREAST SURGERY      left--- a lump--- no cancer    CARDIAC VALVE SURGERY      COLONOSCOPY  2014    COLONOSCOPY N/A 3/14/2018    Procedure: COLONOSCOPY;  Surgeon: Efren Kemp MD;  Location: [facility] ENDO ([address] [place] [address]);  Service: Endoscopy;  Laterality: N/A;  ok to hold Plavix 5 days prior to procedure per Dr RESHMA Hernandez     ok per Dr Kemp to hold Eliquis 2 days prior to procedure (see telephone encounter dated-2/7/18)    CORONARY ANGIOGRAPHY N/A 2/15/2024    Procedure: ANGIOGRAM, CORONARY ARTERY;  Surgeon: Jos Baptiste MD;  Location: [facility] CATH LAB;  Service: Cardiology;  Laterality: N/A;    HERNIA REPAIR      HYSTERECTOMY   partial    1982 partial hysterectomy    LEFT HEART CATHETERIZATION Right 11/5/2019    Procedure: Left heart cath;  Surgeon: Jack Capone MD;  Location: [facility] CATH LAB;  Service: Cardiology;  Laterality: Right;    left nasal polyp      left neck lymph node      nose polyp      right hip fatty mass tissue      stent to small intestine      SUPERIOR VENA CAVA ANGIOPLASTY / STENTING      TONSILLECTOMY      TOTAL ABDOMINAL HYSTERECTOMY      2014    TOTAL KNEE ARTHROPLASTY Bilateral     TREATMENT OF CARDIAC ARRHYTHMIA N/A 2/10/2020    Procedure: CARDIOVERSION;  Surgeon: Jayy Parrish MD;  Location: [facility] EP LAB;  Service: Cardiology;  Laterality: N/A;  AF, PEDRO, DCCV, MAC, DM, 3 Prep    TREATMENT OF CARDIAC ARRHYTHMIA N/A 5/15/2020    Procedure: CARDIOVERSION;  Surgeon: Hany Bee MD;  Location: [facility] EP LAB;  Service: Cardiology;  Laterality: N/A;  AF, PEDRO, DCCV, MAC, DM, 3 Prep    UPPER GASTROINTESTINAL ENDOSCOPY  2014     ROS  10 point ROS performed and negative except as stated in HPI     Physical Examination   Vital Signs  Vitals  BP: (!) 163/70    24 Hour VS Range  BP: (163)/(70)   No intake or output data in the 24 hours ending 05/01/24 1407      Physical Exam:   Constitutional: no acute distress  HEENT: NCAT, EOMI, no scleral icterus  Cardiovascular: Regular rate and rhythm  Pulmonary: Normal respiratory effort   Abdomen: nontender, non-distended   Neuro: alert and oriented, no focal deficits  Extremities: warm, no edema   MSK: no deformities  Skin: intact, no rashes     Data     Recent Labs   Lab 04/26/24  1255   WBC 8.76   HGB 11.8*   HCT 35.8*         Recent Labs   Lab 04/26/24  1255   INR 1.1      Recent Labs   Lab 04/26/24  1255      K 4.2   CL 97   CO2 31*   BUN 28*   CREATININE 0.75   ANIONGAP 10   CALCIUM 9.4      Assessment & Plan     Presents for venogram    Plan  Left arm venogram (contrast prep needed)   At another date plan upgrade to a physiologic pacing  system  Biotronik  Anesthesia  Hold eliquis 48 hours prior  Hold losartan/metformin AM of procedure      Bertha Guo MD  [facility]   Cardiovascular Disease PGY-V

## 2024-05-01 NOTE — PLAN OF CARE
Pt discharged via wheelchair.Hep locks dc'd.Discharge instructions given and verbalized understanding.

## 2024-05-01 NOTE — ADDENDUM NOTE
Addended by: CHAR KEMP on: 1/3/2020 02:10 PM     Modules accepted: Orders    
What Type Of Note Output Would You Prefer (Optional)?: Bullet Format
How Severe Is Your Skin Lesion?: moderate
Has Your Skin Lesion Been Treated?: not been treated
Is This A New Presentation, Or A Follow-Up?: Skin Lesion

## 2024-05-01 NOTE — PLAN OF CARE
Received report from Ep lab. Patient s/p venogram, AAOx3. VSS, no c/o pain or discomfort at this time, resp even and unlabored. Post procedure protocol reviewed with patient and patient's family. Understanding verbalized. Family members at bedside. Nurse call bell within reach. Will continue to monitor per post procedure protocol.

## 2024-05-01 NOTE — DISCHARGE SUMMARY
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  Outpatient follow up with Dr. Moeller    Goals of Care Treatment Preferences:  Code Status: Full Code    Living Will: Yes              Consults:     Significant Diagnostic Studies: N/A    Pending Diagnostic Studies:       Procedure Component Value Units Date/Time    EKG 12-LEAD on arrival to floor [[id number]]     Order Status: Sent Lab Status: No result             There are no hospital problems to display for this patient.    No new Assessment & Plan notes have been filed under this hospital service since the last note was generated.  Service: Arrhythmia      Discharged Condition: stable    Disposition:     Follow Up:    Patient Instructions:   No discharge procedures on file.  Medications:  Reconciled Home Medications:      Medication List        CONTINUE taking these medications      acetaminophen 650 MG Tbsr  Commonly known as: TYLENOL  Take 1,300 mg by mouth 2 (two) times daily as needed.     albuterol-ipratropium 2.5 mg-0.5 mg/3 mL nebulizer solution  Commonly known as: DUO-NEB  Take 3 mLs by nebulization every 4 (four) hours as needed for Wheezing or Shortness of Breath.     ascorbic acid (vitamin C) 500 MG tablet  Commonly known as: VITAMIN C  Take 1,000 mg by mouth once daily.     atorvastatin 10 MG tablet  Commonly known as: LIPITOR  Take 1 tablet (10 mg total) by mouth once daily.     CALTRATE 600 + D ORAL  Take 1 tablet by mouth  once daily.     cimetidine 300 MG tablet  Commonly known as: TAGAMET  Take 300 mg  by mouth  13 hours prior to your procedure (at 1 am) ;  then again 7 hours prior ( at 7 am)  , and again 1 hour prior ( at 1:30 pm )     clopidogreL 75 mg tablet  Commonly known as: PLAVIX  TAKE 1 TABLET BY MOUTH EVERY DAY     diphenhydrAMINE 25 mg capsule  Commonly known as: BenadryL  Take 2 capsules (50 mg total) by mouth as needed (Take 50mg at 13 hours, 7 hours, and 1 hour before procedure). Take 50 mg ( 2 tablets ) by mouth 13 hours prior to your procedure (at 1 am) ;  then again 7 hours prior ( at 7 am)  , and again 1 hour prior ( at 1:30 pm )     DULoxetine 20 MG capsule  Commonly known as: CYMBALTA  Take 1 capsule (20 mg total) by mouth once daily.     ELIQUIS 5 mg Tab  Generic drug: apixaban  TAKE 1 TABLET BY MOUTH TWICE A DAY     empagliflozin 10 mg tablet  Commonly known as: Jardiance  Take 1 tablet (10 mg total) by mouth once daily.     fexofenadine 60 MG tablet  Commonly known as: ALLEGRA  Take 60 mg by mouth once daily.     fish oil-omega-3 fatty acids 300-1,000 mg capsule  Take 1 g by mouth once daily.     furosemide 40 MG tablet  Commonly known as: LASIX  Take 1 tablet (40 mg total) by mouth 2 (two) times a day.     gabapentin 600 MG tablet  Commonly known as: NEURONTIN  Take 600 mg by mouth 3 (three) times daily.     INV losartan 50 MG Tab  Commonly known as: COZAAR  Take 1 tablet (50 mg total) by mouth once daily.     isosorbide mononitrate 60 MG 24 hr tablet  Commonly known as: IMDUR  Take 1 tablet (60 mg total) by mouth once daily.     LORazepam 0.5 MG tablet  Commonly known as: ATIVAN  Take 1 tablet (0.5 mg total) by mouth every morning.     metFORMIN 500 MG tablet  Commonly known as: GLUCOPHAGE  Take 1 tablet (500 mg total) by mouth 2 (two) times daily.     montelukast 10 mg tablet  Commonly known as: SINGULAIR  Take 10 mg by mouth once daily.     nitroGLYCERIN 0.4 MG SL tablet  Commonly known as:  NITROSTAT  Place 1 tablet (0.4 mg total) under the tongue every 5 (five) minutes as needed for Chest pain.     pantoprazole 40 MG tablet  Commonly known as: PROTONIX  Take 1 tablet (40 mg total) by mouth once daily.     predniSONE 50 MG Tab  Commonly known as: DELTASONE  Take 50 mg ( 1 tablet ) by mouth 13 hours prior to your procedure (at 1 am) ;  then again 7 hours prior ( at 7 am)  , and again 1 hour prior ( at 1:30 pm )     ranolazine 1,000 mg Tb12  Commonly known as: RANEXA  Take 1 tablet (1,000 mg total) by mouth 2 (two) times daily.     spironolactone 25 MG tablet  Commonly known as: ALDACTONE  Take 1 tablet (25 mg total) by mouth once daily.     SYMBICORT 160-4.5 mcg/actuation Hfaa  Generic drug: budesonide-formoterol 160-4.5 mcg  Inhale 1 puff into the lungs 2 (two) times daily.     WOMEN'S 50 PLUS MULTIVITAMIN ORAL  Take 1 tablet by mouth once daily.              Time spent on the discharge of patient: 35 minutes    Bertha Guo MD  Cardiac Electrophysiology  [facility] - Short Stay Cardiac Unit

## 2024-05-02 LAB
OHS QRS DURATION: 162 MS
OHS QTC CALCULATION: 502 MS

## 2024-05-02 RX ORDER — CIMETIDINE 300 MG/1
TABLET, FILM COATED ORAL
Qty: 3 TABLET | Refills: 0 | Status: SHIPPED | OUTPATIENT
Start: 2024-05-02

## 2024-05-02 RX ORDER — PREDNISONE 50 MG/1
TABLET ORAL
Qty: 3 TABLET | Refills: 0 | Status: SHIPPED | OUTPATIENT
Start: 2024-05-02

## 2024-05-02 RX ORDER — DIPHENHYDRAMINE HCL 25 MG
CAPSULE ORAL
COMMUNITY
Start: 2024-05-02

## 2024-05-04 NOTE — PLAN OF CARE
Left message on patient's answering machine to return our call.   U Internal Medicine Plan of Care Note    Prior to discharge, Ms. Strong had a resting room air oxygen saturation of 100% and with ambulation she remained about 91%.       Ramonita Fernandez MD, MPH  [facility] Internal Medicine, -2  [facility] Internal Medicine Team A  [phone]

## 2024-05-07 ENCOUNTER — TELEPHONE (OUTPATIENT)
Dept: ELECTROPHYSIOLOGY | Facility: CLINIC | Age: 79
End: 2024-05-07
Payer: MEDICARE

## 2024-05-07 NOTE — TELEPHONE ENCOUNTER
Spoke to Patient.    CONFIRMED procedure arrival time of 12 pm on 5/9/2024 for CRT-P Upgrade with Dr Moeller.    Reiterated instructions including:    -Directions to check in desk    -NPO after midnight night prior to procedure. Fasting upon arrival to the hospital the day of the procedure.     -High importance of HOLDING Eliquis x 2 days prior to the procedure. Confirmed last dose taken 5/6/2024. Losartan, Metformin , Jardiance and Lasix to be held the morning of the procedure.       - Confirms no new meds prescribed or med changes since scheduling .     -Pre-procedure LABS reviewed with no alerts noted.    -Confirmed absence or presence of implanted device/stimulator -BIO PPM     -Confirmed no recent or current fever, cough, or shortness of breath .    -Confirmed no redness, rash, irritation, or yeast infection to skin/ chest / groin area.     -Confirmed Contrast Prep Instructions of taking Benadryl 50 mg, Tagamet 300 mg, and Prednisone 50 mg 13 hrs, 7 hrs, and 1 hr prior to procedure.    -Bathe night prior and morning prior to procedure with Hibiclens solution or an antibacterial soap  -Reviewed current visitor policy    Patient verbalized understanding of above, denies any further questions and appreciated the call.

## 2024-05-08 ENCOUNTER — TELEPHONE (OUTPATIENT)
Dept: ELECTROPHYSIOLOGY | Facility: CLINIC | Age: 79
End: 2024-05-08
Payer: MEDICARE

## 2024-05-08 ENCOUNTER — PATIENT MESSAGE (OUTPATIENT)
Dept: ELECTROPHYSIOLOGY | Facility: CLINIC | Age: 79
End: 2024-05-08
Payer: MEDICARE

## 2024-05-09 ENCOUNTER — TELEPHONE (OUTPATIENT)
Dept: ELECTROPHYSIOLOGY | Facility: CLINIC | Age: 79
End: 2024-05-09
Payer: MEDICARE

## 2024-05-09 NOTE — TELEPHONE ENCOUNTER
Called patient to cancel procedure due to Dr. Moeller being out. Patient did not answer. I left a message with my call back number.

## 2024-05-09 NOTE — TELEPHONE ENCOUNTER
Spoke with Patient's daughter Aure who confirmed that the message was received regarding the need to reschedule her mom's procedure due to Dr Moeller' family emergency. Rescheduled date of 5/29/2024 offered and accepted. Instructed that patient should resume her Eliquis as prescribed and advised that updated instructions will be provided and a new contrast prep prescription will be sent to the patient's pharmacy. Understanding verbalized.

## 2024-05-13 ENCOUNTER — PATIENT MESSAGE (OUTPATIENT)
Dept: ELECTROPHYSIOLOGY | Facility: CLINIC | Age: 79
End: 2024-05-13
Payer: MEDICARE

## 2024-05-13 DIAGNOSIS — Z01.818 PRE-OP TESTING: ICD-10-CM

## 2024-05-13 DIAGNOSIS — I10 ESSENTIAL HYPERTENSION: ICD-10-CM

## 2024-05-13 DIAGNOSIS — Z79.01 CHRONIC ANTICOAGULATION: ICD-10-CM

## 2024-05-13 DIAGNOSIS — I50.23 ACUTE ON CHRONIC SYSTOLIC HEART FAILURE: Primary | ICD-10-CM

## 2024-05-22 ENCOUNTER — LAB VISIT (OUTPATIENT)
Dept: LAB | Facility: HOSPITAL | Age: 79
End: 2024-05-22
Attending: INTERNAL MEDICINE
Payer: MEDICARE

## 2024-05-22 DIAGNOSIS — I50.23 ACUTE ON CHRONIC SYSTOLIC HEART FAILURE: ICD-10-CM

## 2024-05-22 DIAGNOSIS — I10 ESSENTIAL HYPERTENSION: ICD-10-CM

## 2024-05-22 DIAGNOSIS — Z01.818 PRE-OP TESTING: ICD-10-CM

## 2024-05-22 DIAGNOSIS — Z79.01 CHRONIC ANTICOAGULATION: ICD-10-CM

## 2024-05-22 LAB
ANION GAP SERPL CALC-SCNC: 10 MMOL/L (ref 8–16)
APTT PPP: 48.6 SEC (ref 21–32)
BASOPHILS # BLD AUTO: 0.04 K/UL (ref 0–0.2)
BASOPHILS NFR BLD: 0.6 % (ref 0–1.9)
CALCIUM SERPL-MCNC: 9.6 MG/DL (ref 8.7–10.5)
CHLORIDE SERPL-SCNC: 97 MMOL/L (ref 95–110)
CO2 SERPL-SCNC: 31 MMOL/L (ref 23–29)
CREAT SERPL-MCNC: 0.82 MG/DL (ref 0.5–1.4)
DIFFERENTIAL METHOD BLD: ABNORMAL
EOSINOPHIL # BLD AUTO: 0 K/UL (ref 0–0.5)
EOSINOPHIL NFR BLD: 0.5 % (ref 0–8)
ERYTHROCYTE [DISTWIDTH] IN BLOOD BY AUTOMATED COUNT: 14.7 % (ref 11.5–14.5)
EST. GFR  (NO RACE VARIABLE): >60 ML/MIN/1.73 M^2
GLUCOSE SERPL-MCNC: 98 MG/DL (ref 70–110)
HCT VFR BLD AUTO: 34.9 % (ref 37–48.5)
HGB BLD-MCNC: 11.4 G/DL (ref 12–16)
IMM GRANULOCYTES # BLD AUTO: 0.03 K/UL (ref 0–0.04)
IMM GRANULOCYTES NFR BLD AUTO: 0.5 % (ref 0–0.5)
INR PPP: 1.1 (ref 0.8–1.2)
LYMPHOCYTES # BLD AUTO: 1.4 K/UL (ref 1–4.8)
LYMPHOCYTES NFR BLD: 21.8 % (ref 18–48)
MCH RBC QN AUTO: 32 PG (ref 27–31)
MCHC RBC AUTO-ENTMCNC: 32.7 G/DL (ref 32–36)
MCV RBC AUTO: 98 FL (ref 82–98)
MONOCYTES # BLD AUTO: 0.7 K/UL (ref 0.3–1)
MONOCYTES NFR BLD: 10.3 % (ref 4–15)
NEUTROPHILS # BLD AUTO: 4.4 K/UL (ref 1.8–7.7)
NEUTROPHILS NFR BLD: 66.3 % (ref 38–73)
NRBC BLD-RTO: 0 /100 WBC
PLATELET # BLD AUTO: 270 K/UL (ref 150–450)
PMV BLD AUTO: 9.7 FL (ref 9.2–12.9)
POTASSIUM SERPL-SCNC: 4.5 MMOL/L (ref 3.5–5.1)
PROTHROMBIN TIME: 11.9 SEC (ref 9–12.5)
RBC # BLD AUTO: 3.56 M/UL (ref 4–5.4)
SODIUM SERPL-SCNC: 138 MMOL/L (ref 136–145)
UUN UR-MCNC: 24 MG/DL (ref 7–17)
WBC # BLD AUTO: 6.61 K/UL (ref 3.9–12.7)

## 2024-05-22 PROCEDURE — 36415 COLL VENOUS BLD VENIPUNCTURE: CPT | Mod: PN | Performed by: INTERNAL MEDICINE

## 2024-05-22 PROCEDURE — 85025 COMPLETE CBC W/AUTO DIFF WBC: CPT | Mod: PN | Performed by: INTERNAL MEDICINE

## 2024-05-22 PROCEDURE — 85730 THROMBOPLASTIN TIME PARTIAL: CPT | Mod: PN | Performed by: INTERNAL MEDICINE

## 2024-05-22 PROCEDURE — 80048 BASIC METABOLIC PNL TOTAL CA: CPT | Mod: PN | Performed by: INTERNAL MEDICINE

## 2024-05-22 PROCEDURE — 85610 PROTHROMBIN TIME: CPT | Mod: PN | Performed by: INTERNAL MEDICINE

## 2024-05-23 ENCOUNTER — PATIENT MESSAGE (OUTPATIENT)
Dept: ELECTROPHYSIOLOGY | Facility: CLINIC | Age: 79
End: 2024-05-23
Payer: MEDICARE

## 2024-05-27 ENCOUNTER — TELEPHONE (OUTPATIENT)
Dept: ELECTROPHYSIOLOGY | Facility: CLINIC | Age: 79
End: 2024-05-27
Payer: MEDICARE

## 2024-05-27 NOTE — TELEPHONE ENCOUNTER
Spoke to Patient.    CONFIRMED procedure arrival time of 12 pm ( time change) on 5/29/2024 for device upgrade with Dr Moeller.    Reiterated instructions including:    -Directions to check in desk    -NPO after midnight night prior to procedure. Fasting upon arrival to the hospital the day of the procedure.     -High importance of HOLDING ELIQUIS 2 days prior to your procedure.Confirmed last dose taken on 5/26/2024. JARDIANCE, METFORMIN, LOSARTAN and  LASIX (FUROSEMIDE) to be held the morning of the procedure.    - Confirms no new meds prescribed or med changes since scheduling - N/A    -Pre-procedure LABS reviewed with no alerts noted.     -Confirmed absence or presence of implanted device/stimulator - dcPPM bio    -Confirmed no recent or current fever, cough, or shortness of breath .    -Confirmed no redness, rash, irritation, or yeast infection to skin/ chest / groin area.     -Confirmed Contrast Prep Instructions of taking Benadryl 50 mg, Tagamet 300 mg, and Prednisone 50 mg 13 hrs, 7 hrs, and 1 hr prior to procedure.    -Bathe night prior and morning prior to procedure with Hibiclens solution or an antibacterial soap  -Reviewed current visitor policy    Patient verbalized understanding of above, denies any further questions and appreciated the call.

## 2024-05-28 PROBLEM — I50.20 HFREF (HEART FAILURE WITH REDUCED EJECTION FRACTION): Status: ACTIVE | Noted: 2024-05-28

## 2024-05-28 NOTE — SUBJECTIVE & OBJECTIVE
Past Medical History:   Diagnosis Date    Acute on chronic diastolic (congestive) heart failure 11/20/2019    Anticoagulant long-term use     on Plavix since May 2015    Anxiety     Arthritis     Asthma     Atrial fibrillation     Atrial fibrillation with RVR 10/19/2020    Cataracts, bilateral     Chest pain, atypical     Chronic atrial fibrillation with rapid ventricular response 6/23/2017    Colon polyp     Coronary artery disease     Diabetes mellitus     Dry eyes     Esophageal erosions     GERD (gastroesophageal reflux disease)     Heart murmur     Hemorrhoid     High cholesterol     Hypertension     Irritable bowel syndrome     Paroxysmal atrial fibrillation 10/30/2020    Persistent atrial fibrillation 2/10/2020    Shingles 2015    Stenosis of aortic and mitral valves     Stroke     TIA (transient ischemic attack)     Use of cane as ambulatory aid        Past Surgical History:   Procedure Laterality Date    ABDOMINAL SURGERY      stents to SMA    angiocele      2007, 2014    AORTIC VALVE REPLACEMENT N/A 11/19/2019    Procedure: Replacement-valve-aortic;  Surgeon: Jack Capone MD;  Location: [facility] CATH LAB;  Service: Cardiology;  Laterality: N/A;    BREAST SURGERY      left--- a lump--- no cancer    CARDIAC VALVE SURGERY      COLONOSCOPY  2014    COLONOSCOPY N/A 3/14/2018    Procedure: COLONOSCOPY;  Surgeon: Efren Kemp MD;  Location: [facility] ENDO ([address] [place] [address]);  Service: Endoscopy;  Laterality: N/A;  ok to hold Plavix 5 days prior to procedure per Dr RESHMA Hernandez     ok per Dr Kemp to hold Eliquis 2 days prior to procedure (see telephone encounter dated-2/7/18)    CORONARY ANGIOGRAPHY N/A 2/15/2024    Procedure: ANGIOGRAM, CORONARY ARTERY;  Surgeon: Jos Baptiste MD;  Location: [facility] CATH LAB;  Service: Cardiology;  Laterality: N/A;    HERNIA REPAIR      HYSTERECTOMY  partial    1982 partial hysterectomy    LEFT HEART CATHETERIZATION Right 11/5/2019    Procedure: Left heart cath;  Surgeon: Jack Capone MD;   Location: [facility] CATH LAB;  Service: Cardiology;  Laterality: Right;    left nasal polyp      left neck lymph node      nose polyp      PHLEBOGRAPHY Left 5/1/2024    Procedure: Venogram;  Surgeon: Chencho Moeller MD;  Location: [facility] EP LAB;  Service: Cardiology;  Laterality: Left;  HF, Venogram ( Lt arm) , MT, 3 prep ** BIO dcPPM in situ/ Contrast Prep**    right hip fatty mass tissue      stent to small intestine      SUPERIOR VENA CAVA ANGIOPLASTY / STENTING      TONSILLECTOMY      TOTAL ABDOMINAL HYSTERECTOMY      2014    TOTAL KNEE ARTHROPLASTY Bilateral     TREATMENT OF CARDIAC ARRHYTHMIA N/A 2/10/2020    Procedure: CARDIOVERSION;  Surgeon: Jayy Parrish MD;  Location: [facility] EP LAB;  Service: Cardiology;  Laterality: N/A;  AF, PEDRO, DCCV, MAC, DM, 3 Prep    TREATMENT OF CARDIAC ARRHYTHMIA N/A 5/15/2020    Procedure: CARDIOVERSION;  Surgeon: Hany Bee MD;  Location: [facility] EP LAB;  Service: Cardiology;  Laterality: N/A;  AF, PEDRO, DCCV, MAC, DM, 3 Prep    UPPER GASTROINTESTINAL ENDOSCOPY  2014       Review of patient's allergies indicates:   Allergen Reactions    Celebrex [celecoxib] Shortness Of Breath    Ciprofloxacin Swelling     lip    Dicyclomine Hives    Adhesive Dermatitis    Avelox [moxifloxacin] Swelling    Cilostazol Other (See Comments)     Elevates blood pressure    Eryc [erythromycin] Other (See Comments)     unknown    Iodine and iodide containing products Hives    Keflex [cephalexin] Hives    Meclomen      rashes    Meloxicam      Ear ringing    Metoclopramide Other (See Comments)     High blood pressure    Sulfa (sulfonamide antibiotics) Itching       Current Facility-Administered Medications on File Prior to Encounter   Medication    0.9%  NaCl infusion    sodium chloride 0.9% flush 5 mL     Current Outpatient Medications on File Prior to Encounter   Medication Sig    acetaminophen (TYLENOL) 650 MG TbSR Take 1,300 mg by mouth 2 (two) times daily as needed.    albuterol-ipratropium (DUO-NEB)  2.5 mg-0.5 mg/3 mL nebulizer solution Take 3 mLs by nebulization every 4 (four) hours as needed for Wheezing or Shortness of Breath.    ascorbic acid, vitamin C, (VITAMIN C) 500 MG tablet Take 1,000 mg by mouth once daily.     atorvastatin (LIPITOR) 10 MG tablet Take 1 tablet (10 mg total) by mouth once daily.    calcium carbonate/vitamin D3 (CALTRATE 600 + D ORAL) Take 1 tablet by mouth once daily.    clopidogreL (PLAVIX) 75 mg tablet TAKE 1 TABLET BY MOUTH EVERY DAY    DULoxetine (CYMBALTA) 20 MG capsule Take 1 capsule (20 mg total) by mouth once daily.    empagliflozin (JARDIANCE) 10 mg tablet Take 1 tablet (10 mg total) by mouth once daily.    fexofenadine (ALLEGRA) 60 MG tablet Take 60 mg by mouth once daily.    fish oil-omega-3 fatty acids 300-1,000 mg capsule Take 1 g by mouth once daily.     furosemide (LASIX) 40 MG tablet Take 1 tablet (40 mg total) by mouth 2 (two) times a day.    gabapentin (NEURONTIN) 600 MG tablet Take 600 mg by mouth 3 (three) times daily.    isosorbide mononitrate (IMDUR) 60 MG 24 hr tablet Take 1 tablet (60 mg total) by mouth once daily.    LORazepam (ATIVAN) 0.5 MG tablet Take 1 tablet (0.5 mg total) by mouth every morning.    losartan (COZAAR) 50 MG Tab Take 1 tablet (50 mg total) by mouth once daily.    metFORMIN (GLUCOPHAGE) 500 MG tablet Take 1 tablet (500 mg total) by mouth 2 (two) times daily.    montelukast (SINGULAIR) 10 mg tablet Take 10 mg by mouth once daily.    mv-mn/folic ac/calcium/vit K1 (WOMEN'S 50 PLUS MULTIVITAMIN ORAL) Take 1 tablet by mouth once daily.    nitroGLYCERIN (NITROSTAT) 0.4 MG SL tablet Place 1 tablet (0.4 mg total) under the tongue every 5 (five) minutes as needed for Chest pain.    pantoprazole (PROTONIX) 40 MG tablet Take 1 tablet (40 mg total) by mouth once daily.    ranolazine (RANEXA) 1,000 mg Tb12 Take 1 tablet (1,000 mg total) by mouth 2 (two) times daily.    spironolactone (ALDACTONE) 25 MG tablet Take 1 tablet (25 mg total) by mouth once  daily.    SYMBICORT 160-4.5 mcg/actuation HFAA Inhale 1 puff into the lungs 2 (two) times daily.     Family History       Problem Relation (Age of Onset)    Heart attack Mother    Heart failure Brother    Stroke Father          Tobacco Use    Smoking status: Never    Smokeless tobacco: Never   Substance and Sexual Activity    Alcohol use: No    Drug use: Never    Sexual activity: Not Currently     Partners: Male     Review of Systems   Cardiovascular:  Positive for dyspnea on exertion.   All other systems reviewed and are negative.    Objective:     Vital Signs (Most Recent):    Vital Signs (24h Range):  BP: ()/()   Arterial Line BP: ()/()           There is no height or weight on file to calculate BMI.             Physical Exam  Vitals and nursing note reviewed.   Constitutional:       Appearance: Normal appearance.   Cardiovascular:      Rate and Rhythm: Normal rate and regular rhythm.      Pulses: Normal pulses.      Heart sounds: Normal heart sounds.   Pulmonary:      Effort: Pulmonary effort is normal.      Breath sounds: Normal breath sounds.   Musculoskeletal:      Right lower leg: No edema.      Left lower leg: No edema.   Skin:     General: Skin is warm.   Neurological:      Mental Status: She is alert.            Significant Labs: All pertinent lab results from the last 24 hours have been reviewed.

## 2024-05-28 NOTE — ASSESSMENT & PLAN NOTE
In summary, Mrs. Strong is a pleasant 79 year-old woman with permanent AF with slow ventricular response s/p dcPPM implanted in 2022, hypertension, severe obesity, hypertension, type 2 diabetes mellitus, severe AS s/p TAVR, nonobstructive CAD, and COPD. She has a new systolic heart failure likely related to RV pacing. LVEF is 35-40%. RV pacing did not lessen with programming changes and with stopping carvedilol. Recommend upgrading to CRT-P.     We discussed the alternatives, benefits and risks of the procedure including pain, infection, bleeding, injury to lung causing pneumothorax requiring tube placement, injury to heart valves, puncture of the heart leading to pericardial effusion or tamponade requiring tube drainage, heart attack, stroke and death. Discussed addition of lead to the CS as first option. If there are no CS targets would implant a left bundle branch pacing area lead. Discussed need to have venous access available to do this. She has superficial veins along both sides of her chest/arms. Would do a pre-op venogram at a separate earlier date.     Plan  Venogram with near total occlusion of the left distal subclavian-innominate vein junction      Biotronik  Anesthesia  Hold eliquis 48 hours prior  Hold losartan/metformin AM of procedure

## 2024-05-28 NOTE — HPI
79 y.o. female with permanent AF with slow ventricular response s/p dcPPM implanted in 2022, hypertension, severe obesity, hypertension, type 2 diabetes mellitus, severe AS s/p TAVR, nonobstructive CAD, and COPD. She presented to the ER in February with heart failure symptoms. LVEF was newly depressed to 35-40% range. Coronary angiogram noted non-obstructive CAD. She was on moderate dosed carvedilol and was RV paced when temporarily set to lower rate of 30 bpm. Her settings on admission was DDD-CLS 60 with AMS base rate of 70. Plan was to stop carvedilol, reprogram her PPM to VVI 60 and reassess in clinic, for which she presents, prior to making a decision on upgrading to CRT-P. In-clinic device check notes 100% RV pacing even when set to VVI 30.     In-clinic ECG is AF with RV pacing (paced QRS 160ms)    Todays ECG PENDING

## 2024-05-29 ENCOUNTER — ANESTHESIA (OUTPATIENT)
Dept: MEDSURG UNIT | Facility: HOSPITAL | Age: 79
End: 2024-05-29
Payer: MEDICARE

## 2024-05-29 ENCOUNTER — HOSPITAL ENCOUNTER (OUTPATIENT)
Facility: HOSPITAL | Age: 79
Discharge: HOME | End: 2024-05-30
Attending: INTERNAL MEDICINE | Admitting: INTERNAL MEDICINE
Payer: MEDICARE

## 2024-05-29 ENCOUNTER — ANESTHESIA EVENT (OUTPATIENT)
Dept: MEDSURG UNIT | Facility: HOSPITAL | Age: 79
End: 2024-05-29
Payer: MEDICARE

## 2024-05-29 DIAGNOSIS — I49.9 ARRHYTHMIA: ICD-10-CM

## 2024-05-29 DIAGNOSIS — Z95.9 CARDIAC DEVICE IN SITU: ICD-10-CM

## 2024-05-29 DIAGNOSIS — I50.23 ACUTE ON CHRONIC SYSTOLIC HEART FAILURE: ICD-10-CM

## 2024-05-29 DIAGNOSIS — I48.91 ATRIAL FIBRILLATION WITH SLOW VENTRICULAR RESPONSE: ICD-10-CM

## 2024-05-29 DIAGNOSIS — Z95.0 CARDIAC PACEMAKER IN SITU: ICD-10-CM

## 2024-05-29 LAB
OHS QRS DURATION: 168 MS
OHS QTC CALCULATION: 498 MS
POCT GLUCOSE: 136 MG/DL (ref 70–110)

## 2024-05-29 PROCEDURE — 37000008 HC ANESTHESIA 1ST 15 MINUTES: Performed by: INTERNAL MEDICINE

## 2024-05-29 PROCEDURE — 25000003 PHARM REV CODE 250: Performed by: NURSE ANESTHETIST, CERTIFIED REGISTERED

## 2024-05-29 PROCEDURE — 33207 INSERT HEART PM VENTRICULAR: CPT | Mod: 22,SC,, | Performed by: INTERNAL MEDICINE

## 2024-05-29 PROCEDURE — C1785 PMKR, DUAL, RATE-RESP: HCPCS | Performed by: INTERNAL MEDICINE

## 2024-05-29 PROCEDURE — C1898 LEAD, PMKR, OTHER THAN TRANS: HCPCS | Performed by: INTERNAL MEDICINE

## 2024-05-29 PROCEDURE — 27201423 OPTIME MED/SURG SUP & DEVICES STERILE SUPPLY: Performed by: INTERNAL MEDICINE

## 2024-05-29 PROCEDURE — 93005 ELECTROCARDIOGRAM TRACING: CPT | Mod: 59

## 2024-05-29 PROCEDURE — D9220A PRA ANESTHESIA: Mod: CRNA,,, | Performed by: NURSE ANESTHETIST, CERTIFIED REGISTERED

## 2024-05-29 PROCEDURE — D9220A PRA ANESTHESIA: Mod: ANES,,, | Performed by: ANESTHESIOLOGY

## 2024-05-29 PROCEDURE — C1900 LEAD, CORONARY VENOUS: HCPCS | Performed by: INTERNAL MEDICINE

## 2024-05-29 PROCEDURE — 93010 ELECTROCARDIOGRAM REPORT: CPT | Mod: ,,, | Performed by: INTERNAL MEDICINE

## 2024-05-29 PROCEDURE — 82962 GLUCOSE BLOOD TEST: CPT | Performed by: INTERNAL MEDICINE

## 2024-05-29 PROCEDURE — 27000221 HC OXYGEN, UP TO 24 HOURS

## 2024-05-29 PROCEDURE — 63600175 PHARM REV CODE 636 W HCPCS: Performed by: NURSE ANESTHETIST, CERTIFIED REGISTERED

## 2024-05-29 PROCEDURE — 25000003 PHARM REV CODE 250: Performed by: INTERNAL MEDICINE

## 2024-05-29 PROCEDURE — 33207 INSERT HEART PM VENTRICULAR: CPT | Mod: SC | Performed by: INTERNAL MEDICINE

## 2024-05-29 PROCEDURE — 36620 INSERTION CATHETER ARTERY: CPT | Mod: 59,,, | Performed by: ANESTHESIOLOGY

## 2024-05-29 PROCEDURE — C1769 GUIDE WIRE: HCPCS | Performed by: INTERNAL MEDICINE

## 2024-05-29 PROCEDURE — 25000003 PHARM REV CODE 250: Performed by: STUDENT IN AN ORGANIZED HEALTH CARE EDUCATION/TRAINING PROGRAM

## 2024-05-29 PROCEDURE — 37000009 HC ANESTHESIA EA ADD 15 MINS: Performed by: INTERNAL MEDICINE

## 2024-05-29 PROCEDURE — 33225 L VENTRIC PACING LEAD ADD-ON: CPT | Mod: 74 | Performed by: INTERNAL MEDICINE

## 2024-05-29 PROCEDURE — C1894 INTRO/SHEATH, NON-LASER: HCPCS | Performed by: INTERNAL MEDICINE

## 2024-05-29 PROCEDURE — 33233 REMOVAL OF PM GENERATOR: CPT | Performed by: INTERNAL MEDICINE

## 2024-05-29 PROCEDURE — 33225 L VENTRIC PACING LEAD ADD-ON: CPT | Mod: 53,,, | Performed by: INTERNAL MEDICINE

## 2024-05-29 PROCEDURE — 33233 REMOVAL OF PM GENERATOR: CPT | Mod: ICN,,, | Performed by: INTERNAL MEDICINE

## 2024-05-29 PROCEDURE — 63600175 PHARM REV CODE 636 W HCPCS: Performed by: NURSE PRACTITIONER

## 2024-05-29 PROCEDURE — 27800903 OPTIME MED/SURG SUP & DEVICES OTHER IMPLANTS: Performed by: INTERNAL MEDICINE

## 2024-05-29 PROCEDURE — 25000003 PHARM REV CODE 250: Performed by: NURSE PRACTITIONER

## 2024-05-29 PROCEDURE — 63600175 PHARM REV CODE 636 W HCPCS: Performed by: INTERNAL MEDICINE

## 2024-05-29 PROCEDURE — 94761 N-INVAS EAR/PLS OXIMETRY MLT: CPT

## 2024-05-29 DEVICE — IMPLANTABLE DEVICE
Type: IMPLANTABLE DEVICE | Site: HEART | Status: FUNCTIONAL
Brand: SOLIA

## 2024-05-29 DEVICE — IMPLANTABLE DEVICE
Type: IMPLANTABLE DEVICE | Site: HEART | Status: FUNCTIONAL
Brand: EDORA 8 HF-T

## 2024-05-29 DEVICE — ENVELOPE CMRM6122 ABSORB MED MR
Type: IMPLANTABLE DEVICE | Site: CHEST  WALL | Status: FUNCTIONAL
Brand: TYRX™

## 2024-05-29 RX ORDER — LOSARTAN POTASSIUM 50 MG/1
50 TABLET ORAL DAILY
Status: DISCONTINUED | OUTPATIENT
Start: 2024-05-30 | End: 2024-05-30 | Stop reason: HOSPADM

## 2024-05-29 RX ORDER — DEXAMETHASONE SODIUM PHOSPHATE 4 MG/ML
INJECTION, SOLUTION INTRA-ARTICULAR; INTRALESIONAL; INTRAMUSCULAR; INTRAVENOUS; SOFT TISSUE
Status: DISCONTINUED | OUTPATIENT
Start: 2024-05-29 | End: 2024-05-29

## 2024-05-29 RX ORDER — SUCCINYLCHOLINE CHLORIDE 20 MG/ML
INJECTION INTRAMUSCULAR; INTRAVENOUS
Status: DISCONTINUED | OUTPATIENT
Start: 2024-05-29 | End: 2024-05-29

## 2024-05-29 RX ORDER — CLOPIDOGREL BISULFATE 75 MG/1
75 TABLET ORAL DAILY
Status: DISCONTINUED | OUTPATIENT
Start: 2024-05-30 | End: 2024-05-30 | Stop reason: HOSPADM

## 2024-05-29 RX ORDER — ISOSORBIDE MONONITRATE 60 MG/1
60 TABLET, EXTENDED RELEASE ORAL DAILY
Status: DISCONTINUED | OUTPATIENT
Start: 2024-05-30 | End: 2024-05-30 | Stop reason: HOSPADM

## 2024-05-29 RX ORDER — PROPOFOL 10 MG/ML
VIAL (ML) INTRAVENOUS
Status: DISCONTINUED | OUTPATIENT
Start: 2024-05-29 | End: 2024-05-29

## 2024-05-29 RX ORDER — ATORVASTATIN CALCIUM 10 MG/1
10 TABLET, FILM COATED ORAL DAILY
Status: DISCONTINUED | OUTPATIENT
Start: 2024-05-30 | End: 2024-05-30 | Stop reason: HOSPADM

## 2024-05-29 RX ORDER — LIDOCAINE HYDROCHLORIDE 20 MG/ML
INJECTION INTRAVENOUS
Status: DISCONTINUED | OUTPATIENT
Start: 2024-05-29 | End: 2024-05-29

## 2024-05-29 RX ORDER — DEXMEDETOMIDINE HYDROCHLORIDE 100 UG/ML
INJECTION, SOLUTION INTRAVENOUS
Status: DISCONTINUED | OUTPATIENT
Start: 2024-05-29 | End: 2024-05-29

## 2024-05-29 RX ORDER — DULOXETIN HYDROCHLORIDE 20 MG/1
20 CAPSULE, DELAYED RELEASE ORAL DAILY
Status: DISCONTINUED | OUTPATIENT
Start: 2024-05-30 | End: 2024-05-30 | Stop reason: HOSPADM

## 2024-05-29 RX ORDER — GABAPENTIN 300 MG/1
600 CAPSULE ORAL 3 TIMES DAILY
Status: DISCONTINUED | OUTPATIENT
Start: 2024-05-29 | End: 2024-05-30 | Stop reason: HOSPADM

## 2024-05-29 RX ORDER — LORAZEPAM 0.5 MG/1
0.5 TABLET ORAL NIGHTLY
Status: DISCONTINUED | OUTPATIENT
Start: 2024-05-29 | End: 2024-05-30 | Stop reason: HOSPADM

## 2024-05-29 RX ORDER — PANTOPRAZOLE SODIUM 40 MG/1
40 TABLET, DELAYED RELEASE ORAL DAILY
Status: DISCONTINUED | OUTPATIENT
Start: 2024-05-30 | End: 2024-05-30 | Stop reason: HOSPADM

## 2024-05-29 RX ORDER — VANCOMYCIN HYDROCHLORIDE 1 G/20ML
INJECTION, POWDER, LYOPHILIZED, FOR SOLUTION INTRAVENOUS
Status: DISCONTINUED | OUTPATIENT
Start: 2024-05-29 | End: 2024-05-30 | Stop reason: HOSPADM

## 2024-05-29 RX ORDER — ROCURONIUM BROMIDE 10 MG/ML
INJECTION, SOLUTION INTRAVENOUS
Status: DISCONTINUED | OUTPATIENT
Start: 2024-05-29 | End: 2024-05-29

## 2024-05-29 RX ORDER — FUROSEMIDE 40 MG/1
40 TABLET ORAL 2 TIMES DAILY
Status: DISCONTINUED | OUTPATIENT
Start: 2024-05-29 | End: 2024-05-30 | Stop reason: HOSPADM

## 2024-05-29 RX ORDER — SPIRONOLACTONE 25 MG/1
25 TABLET ORAL DAILY
Status: DISCONTINUED | OUTPATIENT
Start: 2024-05-30 | End: 2024-05-30 | Stop reason: HOSPADM

## 2024-05-29 RX ORDER — ONDANSETRON HYDROCHLORIDE 2 MG/ML
INJECTION, SOLUTION INTRAVENOUS
Status: DISCONTINUED | OUTPATIENT
Start: 2024-05-29 | End: 2024-05-29

## 2024-05-29 RX ORDER — BUPIVACAINE HYDROCHLORIDE 2.5 MG/ML
INJECTION, SOLUTION EPIDURAL; INFILTRATION; INTRACAUDAL
Status: DISCONTINUED | OUTPATIENT
Start: 2024-05-29 | End: 2024-05-30 | Stop reason: HOSPADM

## 2024-05-29 RX ORDER — PHENYLEPHRINE HYDROCHLORIDE 10 MG/ML
INJECTION INTRAVENOUS CONTINUOUS PRN
Status: DISCONTINUED | OUTPATIENT
Start: 2024-05-29 | End: 2024-05-29

## 2024-05-29 RX ORDER — HYDRALAZINE HYDROCHLORIDE 20 MG/ML
INJECTION INTRAMUSCULAR; INTRAVENOUS
Status: DISCONTINUED | OUTPATIENT
Start: 2024-05-29 | End: 2024-05-29

## 2024-05-29 RX ORDER — SODIUM CHLORIDE 0.9 % (FLUSH) 0.9 %
3 SYRINGE (ML) INJECTION
Status: DISCONTINUED | OUTPATIENT
Start: 2024-05-29 | End: 2024-05-30 | Stop reason: HOSPADM

## 2024-05-29 RX ORDER — DOXYCYCLINE HYCLATE 100 MG
100 TABLET ORAL EVERY 12 HOURS
Status: DISCONTINUED | OUTPATIENT
Start: 2024-05-29 | End: 2024-05-30 | Stop reason: HOSPADM

## 2024-05-29 RX ORDER — LIDOCAINE HYDROCHLORIDE 20 MG/ML
INJECTION, SOLUTION INFILTRATION; PERINEURAL
Status: DISCONTINUED | OUTPATIENT
Start: 2024-05-29 | End: 2024-05-30 | Stop reason: HOSPADM

## 2024-05-29 RX ORDER — ACETAMINOPHEN 325 MG/1
650 TABLET ORAL EVERY 4 HOURS PRN
Status: DISCONTINUED | OUTPATIENT
Start: 2024-05-29 | End: 2024-05-30 | Stop reason: HOSPADM

## 2024-05-29 RX ORDER — RANOLAZINE 500 MG/1
1000 TABLET, EXTENDED RELEASE ORAL 2 TIMES DAILY
Status: DISCONTINUED | OUTPATIENT
Start: 2024-05-29 | End: 2024-05-30 | Stop reason: HOSPADM

## 2024-05-29 RX ORDER — SODIUM CHLORIDE 9 MG/ML
INJECTION, SOLUTION INTRAMUSCULAR; INTRAVENOUS; SUBCUTANEOUS
Status: DISCONTINUED | OUTPATIENT
Start: 2024-05-29 | End: 2024-05-30 | Stop reason: HOSPADM

## 2024-05-29 RX ORDER — DOXYCYCLINE 100 MG/1
100 CAPSULE ORAL EVERY 12 HOURS
Qty: 10 CAPSULE | Refills: 0 | Status: SHIPPED | OUTPATIENT
Start: 2024-05-29 | End: 2024-06-04

## 2024-05-29 RX ADMIN — GABAPENTIN 600 MG: 300 CAPSULE ORAL at 08:05

## 2024-05-29 RX ADMIN — SUCCINYLCHOLINE CHLORIDE 140 MG: 20 INJECTION, SOLUTION INTRAMUSCULAR; INTRAVENOUS at 02:05

## 2024-05-29 RX ADMIN — DOXYCYCLINE HYCLATE 100 MG: 100 TABLET, COATED ORAL at 08:05

## 2024-05-29 RX ADMIN — VANCOMYCIN HYDROCHLORIDE 1000 MG: 1 INJECTION, POWDER, LYOPHILIZED, FOR SOLUTION INTRAVENOUS at 02:05

## 2024-05-29 RX ADMIN — ROCURONIUM BROMIDE 20 MG: 10 INJECTION INTRAVENOUS at 03:05

## 2024-05-29 RX ADMIN — ONDANSETRON 4 MG: 2 INJECTION INTRAMUSCULAR; INTRAVENOUS at 02:05

## 2024-05-29 RX ADMIN — LORAZEPAM 0.5 MG: 0.5 TABLET ORAL at 08:05

## 2024-05-29 RX ADMIN — LIDOCAINE HYDROCHLORIDE 75 MG: 20 INJECTION INTRAVENOUS at 02:05

## 2024-05-29 RX ADMIN — ROCURONIUM BROMIDE 5 MG: 10 INJECTION INTRAVENOUS at 02:05

## 2024-05-29 RX ADMIN — ROCURONIUM BROMIDE 10 MG: 10 INJECTION INTRAVENOUS at 04:05

## 2024-05-29 RX ADMIN — ACETAMINOPHEN 650 MG: 325 TABLET ORAL at 06:05

## 2024-05-29 RX ADMIN — HYDRALAZINE HYDROCHLORIDE 10 MG: 20 INJECTION INTRAMUSCULAR; INTRAVENOUS at 06:05

## 2024-05-29 RX ADMIN — DEXMEDETOMIDINE 12 MCG: 100 INJECTION, SOLUTION, CONCENTRATE INTRAVENOUS at 02:05

## 2024-05-29 RX ADMIN — SUGAMMADEX 200 MG: 100 INJECTION, SOLUTION INTRAVENOUS at 05:05

## 2024-05-29 RX ADMIN — ROCURONIUM BROMIDE 20 MG: 10 INJECTION INTRAVENOUS at 04:05

## 2024-05-29 RX ADMIN — RANOLAZINE 1000 MG: 500 TABLET, EXTENDED RELEASE ORAL at 10:05

## 2024-05-29 RX ADMIN — PROPOFOL 100 MG: 10 INJECTION, EMULSION INTRAVENOUS at 02:05

## 2024-05-29 RX ADMIN — PHENYLEPHRINE HYDROCHLORIDE 0.4 MCG/KG/MIN: 10 INJECTION INTRAVENOUS at 03:05

## 2024-05-29 RX ADMIN — ROCURONIUM BROMIDE 45 MG: 10 INJECTION INTRAVENOUS at 02:05

## 2024-05-29 RX ADMIN — SODIUM CHLORIDE, SODIUM GLUCONATE, SODIUM ACETATE, POTASSIUM CHLORIDE, MAGNESIUM CHLORIDE, SODIUM PHOSPHATE, DIBASIC, AND POTASSIUM PHOSPHATE: .53; .5; .37; .037; .03; .012; .00082 INJECTION, SOLUTION INTRAVENOUS at 02:05

## 2024-05-29 RX ADMIN — PROPOFOL 50 MG: 10 INJECTION, EMULSION INTRAVENOUS at 05:05

## 2024-05-29 RX ADMIN — PROPOFOL 50 MG: 10 INJECTION, EMULSION INTRAVENOUS at 04:05

## 2024-05-29 RX ADMIN — DEXAMETHASONE SODIUM PHOSPHATE 4 MG: 4 INJECTION, SOLUTION INTRAMUSCULAR; INTRAVENOUS at 02:05

## 2024-05-29 RX ADMIN — DEXMEDETOMIDINE 8 MCG: 100 INJECTION, SOLUTION, CONCENTRATE INTRAVENOUS at 04:05

## 2024-05-29 RX ADMIN — SODIUM CHLORIDE: 0.9 INJECTION, SOLUTION INTRAVENOUS at 02:05

## 2024-05-29 RX ADMIN — FUROSEMIDE 40 MG: 40 TABLET ORAL at 08:05

## 2024-05-29 NOTE — ANESTHESIA PROCEDURE NOTES
Intubation    Date/Time: 5/29/2024 3:14 PM    Performed by: Zach Chatman CRNA  Authorized by: Ruddy Salcedo MD    Intubation:     Induction:  Intravenous    Intubated:  Postinduction    Mask Ventilation:  Easy mask    Attempts:  1    Attempted By:  CRNA    Method of Intubation:  Video laryngoscopy    Blade:  Guerrero 3    Laryngeal View Grade: Grade I - full view of cords      Difficult Airway Encountered?: No      Complications:  None    Airway Device:  Oral endotracheal tube    Airway Device Size:  7.5    Style/Cuff Inflation:  Cuffed    Inflation Amount (mL):  8    Tube secured:  23    Placement Verified By:  Capnometry and Colorimetric ETCO2 device    Complicating Factors:  None    Findings Post-Intubation:  BS equal bilateral and atraumatic/condition of teeth unchanged

## 2024-05-29 NOTE — TRANSFER OF CARE
"Anesthesia Transfer of Care Note    Patient: Adriana Strong    Procedure(s) Performed: Procedure(s) (LRB):  Biventricular pacemaker upgrade (Left)    Patient location: PACU    Anesthesia Type: general    Transport from OR: Transported from OR on 6-10 L/min O2 by face mask with adequate spontaneous ventilation    Post pain: adequate analgesia    Post assessment: no apparent anesthetic complications    Post vital signs: stable    Level of consciousness: awake, alert and oriented    Nausea/Vomiting: no nausea/vomiting    Complications: none    Transfer of care protocol was followed      Last vitals: Visit Vitals  BP (!) 163/70 (BP Location: Left arm, Patient Position: Sitting)   Pulse 60   Temp 36.7 °C (98.1 °F) (Temporal)   Resp 14   Ht 5' 4" (1.626 m)   Wt 97.5 kg (215 lb)   LMP 01/21/1973 (LMP Unknown)   SpO2 98%   Breastfeeding No   BMI 36.90 kg/m²     "

## 2024-05-29 NOTE — ANESTHESIA PROCEDURE NOTES
Arterial    Diagnosis: Systolic CHF    Patient location during procedure: done in OR    Staffing  Authorizing Provider: Ruddy Salcedo MD  Performing Provider: Ruddy Salcedo MD    Staffing  Performed by: Ruddy Salcedo MD  Authorized by: Ruddy Salcedo MD    Anesthesiologist was present at the time of the procedure.    Preanesthetic Checklist  Completed: patient identified, IV checked, site marked, risks and benefits discussed, surgical consent, monitors and equipment checked, pre-op evaluation, timeout performed and anesthesia consent givenArterial  Skin Prep: chlorhexidine gluconate  Local Infiltration: none  Orientation: left  Location: radial    Catheter Size: 20 G  Catheter placement by Anatomical landmarks. Heme positive aspiration all ports. Insertion Attempts: 2  Assessment  Dressing: secured with tape and tegaderm  Patient: Tolerated well

## 2024-05-29 NOTE — Clinical Note
A venogram was performed in the left axillary vein. The vessel was injected via hand injection  with 10 mL of contrast. 10cc visi injected by anesthesia

## 2024-05-29 NOTE — Clinical Note
The lead was inserted under fluorscopic guidance, thresholds were tested, was sutured in place and was secured in place. A new lead was attached to the coronary sinus.

## 2024-05-29 NOTE — Clinical Note
The lead was connected to generator.      The chronic RA and RV lead was connected to a replacement chronic generator.

## 2024-05-29 NOTE — H&P
[facility] Card[facility]  Cardiac Electrophysiology  History and Physical     Admission Date: 5/29/2024  Code Status: Prior   Attending Provider: Chencho Moeller MD   Principal Problem:HFrEF (heart failure with reduced ejection fraction)    Subjective:     Chief Complaint:  HFrEF     HPI:  79 y.o. female with permanent AF with slow ventricular response s/p dcPPM implanted in 2022, hypertension, severe obesity, hypertension, type 2 diabetes mellitus, severe AS s/p TAVR, nonobstructive CAD, and COPD. She presented to the ER in February with heart failure symptoms. LVEF was newly depressed to 35-40% range. Coronary angiogram noted non-obstructive CAD. She was on moderate dosed carvedilol and was RV paced when temporarily set to lower rate of 30 bpm. Her settings on admission was DDD-CLS 60 with AMS base rate of 70. Plan was to stop carvedilol, reprogram her PPM to VVI 60 and reassess in clinic, for which she presents, prior to making a decision on upgrading to CRT-P. In-clinic device check notes 100% RV pacing even when set to VVI 30.     In-clinic ECG is AF with RV pacing (paced QRS 160ms)    Todays ECG PENDING    Past Medical History:   Diagnosis Date    Acute on chronic diastolic (congestive) heart failure 11/20/2019    Anticoagulant long-term use     on Plavix since May 2015    Anxiety     Arthritis     Asthma     Atrial fibrillation     Atrial fibrillation with RVR 10/19/2020    Cataracts, bilateral     Chest pain, atypical     Chronic atrial fibrillation with rapid ventricular response 6/23/2017    Colon polyp     Coronary artery disease     Diabetes mellitus     Dry eyes     Esophageal erosions     GERD (gastroesophageal reflux disease)     Heart murmur     Hemorrhoid     High cholesterol     Hypertension     Irritable bowel syndrome     Paroxysmal atrial fibrillation 10/30/2020    Persistent atrial fibrillation 2/10/2020    Shingles 2015    Stenosis of aortic and mitral valves     Stroke     TIA  (transient ischemic attack)     Use of cane as ambulatory aid        Past Surgical History:   Procedure Laterality Date    ABDOMINAL SURGERY      stents to SMA    angiocele      2007, 2014    AORTIC VALVE REPLACEMENT N/A 11/19/2019    Procedure: Replacement-valve-aortic;  Surgeon: Jack Capone MD;  Location: [facility] CATH LAB;  Service: Cardiology;  Laterality: N/A;    BREAST SURGERY      left--- a lump--- no cancer    CARDIAC VALVE SURGERY      COLONOSCOPY  2014    COLONOSCOPY N/A 3/14/2018    Procedure: COLONOSCOPY;  Surgeon: Efren Kemp MD;  Location: [facility] ENDO ([facility]);  Service: Endoscopy;  Laterality: N/A;  ok to hold Plavix 5 days prior to procedure per Dr RESHMA Hernandez     ok per Dr Kemp to hold Eliquis 2 days prior to procedure (see telephone encounter dated-2/7/18)    CORONARY ANGIOGRAPHY N/A 2/15/2024    Procedure: ANGIOGRAM, CORONARY ARTERY;  Surgeon: Jos Baptiste MD;  Location: [facility] CATH LAB;  Service: Cardiology;  Laterality: N/A;    HERNIA REPAIR      HYSTERECTOMY  partial    1982 partial hysterectomy    LEFT HEART CATHETERIZATION Right 11/5/2019    Procedure: Left heart cath;  Surgeon: Jack Capone MD;  Location: [facility] CATH LAB;  Service: Cardiology;  Laterality: Right;    left nasal polyp      left neck lymph node      nose polyp      PHLEBOGRAPHY Left 5/1/2024    Procedure: Venogram;  Surgeon: Chencho Moeller MD;  Location: [facility] EP LAB;  Service: Cardiology;  Laterality: Left;  HF, Venogram ( Lt arm) , ND, 3 prep ** BIO dcPPM in situ/ Contrast Prep**    right hip fatty mass tissue      stent to small intestine      SUPERIOR VENA CAVA ANGIOPLASTY / STENTING      TONSILLECTOMY      TOTAL ABDOMINAL HYSTERECTOMY      2014    TOTAL KNEE ARTHROPLASTY Bilateral     TREATMENT OF CARDIAC ARRHYTHMIA N/A 2/10/2020    Procedure: CARDIOVERSION;  Surgeon: Jayy Parrish MD;  Location: [facility] EP LAB;  Service: Cardiology;  Laterality: N/A;  AF, PEDRO, DCCV, MAC, DM, 3 Prep    TREATMENT OF CARDIAC ARRHYTHMIA N/A  5/15/2020    Procedure: CARDIOVERSION;  Surgeon: Hany Bee MD;  Location: [facility] LAB;  Service: Cardiology;  Laterality: N/A;  AF, PEDRO, DCCV, MAC, DM, 3 Prep    UPPER GASTROINTESTINAL ENDOSCOPY  2014       Review of patient's allergies indicates:   Allergen Reactions    Celebrex [celecoxib] Shortness Of Breath    Ciprofloxacin Swelling     lip    Dicyclomine Hives    Adhesive Dermatitis    Avelox [moxifloxacin] Swelling    Cilostazol Other (See Comments)     Elevates blood pressure    Eryc [erythromycin] Other (See Comments)     unknown    Iodine and iodide containing products Hives    Keflex [cephalexin] Hives    Meclomen      rashes    Meloxicam      Ear ringing    Metoclopramide Other (See Comments)     High blood pressure    Sulfa (sulfonamide antibiotics) Itching       Current Facility-Administered Medications on File Prior to Encounter   Medication    0.9%  NaCl infusion    sodium chloride 0.9% flush 5 mL     Current Outpatient Medications on File Prior to Encounter   Medication Sig    acetaminophen (TYLENOL) 650 MG TbSR Take 1,300 mg by mouth 2 (two) times daily as needed.    albuterol-ipratropium (DUO-NEB) 2.5 mg-0.5 mg/3 mL nebulizer solution Take 3 mLs by nebulization every 4 (four) hours as needed for Wheezing or Shortness of Breath.    ascorbic acid, vitamin C, (VITAMIN C) 500 MG tablet Take 1,000 mg by mouth once daily.     atorvastatin (LIPITOR) 10 MG tablet Take 1 tablet (10 mg total) by mouth once daily.    calcium carbonate/vitamin D3 (CALTRATE 600 + D ORAL) Take 1 tablet by mouth once daily.    clopidogreL (PLAVIX) 75 mg tablet TAKE 1 TABLET BY MOUTH EVERY DAY    DULoxetine (CYMBALTA) 20 MG capsule Take 1 capsule (20 mg total) by mouth once daily.    empagliflozin (JARDIANCE) 10 mg tablet Take 1 tablet (10 mg total) by mouth once daily.    fexofenadine (ALLEGRA) 60 MG tablet Take 60 mg by mouth once daily.    fish oil-omega-3 fatty acids 300-1,000 mg capsule Take 1 g by mouth once daily.      furosemide (LASIX) 40 MG tablet Take 1 tablet (40 mg total) by mouth 2 (two) times a day.    gabapentin (NEURONTIN) 600 MG tablet Take 600 mg by mouth 3 (three) times daily.    isosorbide mononitrate (IMDUR) 60 MG 24 hr tablet Take 1 tablet (60 mg total) by mouth once daily.    LORazepam (ATIVAN) 0.5 MG tablet Take 1 tablet (0.5 mg total) by mouth every morning.    losartan (COZAAR) 50 MG Tab Take 1 tablet (50 mg total) by mouth once daily.    metFORMIN (GLUCOPHAGE) 500 MG tablet Take 1 tablet (500 mg total) by mouth 2 (two) times daily.    montelukast (SINGULAIR) 10 mg tablet Take 10 mg by mouth once daily.    mv-mn/folic ac/calcium/vit K1 (WOMEN'S 50 PLUS MULTIVITAMIN ORAL) Take 1 tablet by mouth once daily.    nitroGLYCERIN (NITROSTAT) 0.4 MG SL tablet Place 1 tablet (0.4 mg total) under the tongue every 5 (five) minutes as needed for Chest pain.    pantoprazole (PROTONIX) 40 MG tablet Take 1 tablet (40 mg total) by mouth once daily.    ranolazine (RANEXA) 1,000 mg Tb12 Take 1 tablet (1,000 mg total) by mouth 2 (two) times daily.    spironolactone (ALDACTONE) 25 MG tablet Take 1 tablet (25 mg total) by mouth once daily.    SYMBICORT 160-4.5 mcg/actuation HFAA Inhale 1 puff into the lungs 2 (two) times daily.     Family History       Problem Relation (Age of Onset)    Heart attack Mother    Heart failure Brother    Stroke Father          Tobacco Use    Smoking status: Never    Smokeless tobacco: Never   Substance and Sexual Activity    Alcohol use: No    Drug use: Never    Sexual activity: Not Currently     Partners: Male     Review of Systems   Cardiovascular:  Positive for dyspnea on exertion.   All other systems reviewed and are negative.    Objective:     Vital Signs (Most Recent):    Vital Signs (24h Range):  BP: ()/()   Arterial Line BP: ()/()           There is no height or weight on file to calculate BMI.             Physical Exam  Vitals and nursing note reviewed.   Constitutional:       Appearance:  Normal appearance.   Cardiovascular:      Rate and Rhythm: Normal rate and regular rhythm.      Pulses: Normal pulses.      Heart sounds: Normal heart sounds.   Pulmonary:      Effort: Pulmonary effort is normal.      Breath sounds: Normal breath sounds.   Musculoskeletal:      Right lower leg: No edema.      Left lower leg: No edema.   Skin:     General: Skin is warm.   Neurological:      Mental Status: She is alert.            Significant Labs: All pertinent lab results from the last 24 hours have been reviewed.    Assessment and Plan:     * HFrEF (heart failure with reduced ejection fraction)  In summary, Mrs. Strong is a pleasant 79 year-old woman with permanent AF with slow ventricular response s/p dcPPM implanted in 2022, hypertension, severe obesity, hypertension, type 2 diabetes mellitus, severe AS s/p TAVR, nonobstructive CAD, and COPD. She has a new systolic heart failure likely related to RV pacing. LVEF is 35-40%. RV pacing did not lessen with programming changes and with stopping carvedilol. Recommend upgrading to CRT-P.     We discussed the alternatives, benefits and risks of the procedure including pain, infection, bleeding, injury to lung causing pneumothorax requiring tube placement, injury to heart valves, puncture of the heart leading to pericardial effusion or tamponade requiring tube drainage, heart attack, stroke and death. Discussed addition of lead to the CS as first option. If there are no CS targets would implant a left bundle branch pacing area lead. Discussed need to have venous access available to do this. She has superficial veins along both sides of her chest/arms. Would do a pre-op venogram at a separate earlier date.     Plan  Venogram with near total occlusion of the left distal subclavian-innominate vein junction      Biotronik  Anesthesia  Hold eliquis 48 hours prior  Hold losartan/metformin AM of procedure        Keiry Doll MD  Cardiac Electrophysiology  [facility]

## 2024-05-29 NOTE — Clinical Note
The balloon was inflated with indeflator in the  . The balloon max pressure was 5 vnii for 1 seconds

## 2024-05-29 NOTE — Clinical Note
A dressing was applied to the incision. To be applied by PACU RN 30 min following dermabond application

## 2024-05-29 NOTE — PLAN OF CARE
Patient arrived to room. PIV placed. Admit assessment completed. Plan of care discussed with patient. Sacral and heel pads placed. Hx of stroke, needs wheelchair/walker for short distance ambulation. Dye prep completed tagament 300mg-benadryl 25mg- [id number] x 3 doses 2am, 8am and 1130am today.

## 2024-05-29 NOTE — LETTER
May 9, 2024    Adriana Strong  [address]  [place] [address]              Patient Instructions  Device Upgrade    Personal Assistance / Insurance Authorization  The [facility] pre-services team will be submitting to your medical insurance carrier for authorization. However, sometimes there are financial obligations such as co-pays, out of pocket deductibles, and/or payments required.  It is the patient's responsibility to assure their procedure is financially cleared in advance.  Our expert financial counselors are available to provide patients with assistance for personalized cost estimates.  Please reach out to determine if you have a financial responsibility with regards to your procedures.  Call [phone] to speak with an [facility] financial counselor  Live Chat- accessed through [email or web address] or the [employer] patient portal  Email- request a personalized estimate through email at [email or web address] MyChart messaging- accessed through the [employer] patient portal              Pre-Procedure Testin2024 at 1:00 pm - Blood Work- COMPLETED            **Prior to your procedure please check your skin and groin area thoroughly for any signs or symptoms of infection such as rashes, open sores, yeast infection, or heat rashes.  If you have any concerns with your skin you should be evaluated by your Primary Care Provider or an Urgent Care for treatment prior to your procedure. Your procedure will be cancelled if not treated prior to the day of your procedure.**                            Important Medication Information:      HOLD (do NOT take) ELIQUIS 2 days prior to your procedure.  Your last dose should be taken on 2024.      HOLD (do NOT take) JARDIANCE, METFORMIN, LOSARTAN and  LASIX (FUROSEMIDE) on the day of your procedure.  Your last dose should be taken on 2024.    You may take your other usual morning medications with a sip of water on the day of your  procedure.          YOU HAVE AN IODINE ALLERGY -  Please follow the instructions below to prepare for your procedure. Prescriptions for will be sent to your pharmacy for Prednisone and Tagamet (cimetidine) .   (Tagamet and Benadryl can be found over the counter at your pharmacy)    5/29/2024 at 2:00 am (13 hours prior) you will need to take Prednisone 50 mg, Tagamet (Cimetidine) 300 mg, and Benadryl 50 mg.  Please follow box warnings for Benadryl and we ask you do not drive 12 hours after taking.    5/29/2024  at 8:00 am (7 hours prior) you will need to take Prednisone 50 mg, Tagamet (Cimetidine) 300 mg, and Benadryl 50 mg.  Please follow box warnings for Benadryl and we ask you do not drive 12 hours after taking.    5/29/2024 at 1:00 pm (upon arrival to the hospital) you will need to take Prednisone 50 mg, Tagamet (Cimetidine) 300 mg, and Benadryl 50 mg.  YOU WILL HAVE TO BRING THESE MEDICATIONS WITH YOU TO THE HOSPITAL TO  TAKE THE FINAL DOSE OF YOUR PREP  ONCE YOU ARRIVE TO THE HOSPITAL ON THE DAY OF YOUR PROCEDURE.         PLEASE NOTE - IF YOU ARE STARTED ON A NEW MEDICATION OR INSTRUCTED TO DISCONTINUE A MEDICATION ANYTIME PRIOR TO YOUR PROCEDURE PLEASE NOTIFY THE OFFICE AS THIS MAY CHANGE YOUR MEDICATION INSTRUCTIONS                Day of Procedure:    5/29/2024   Arrival Time -  1:00 PM ( Arrival times are subject to change. You will be contacted 1-2 days prior to your procedure day to confirm arrival time)      [facility] [address] [place] [address]    Please report to Cardiology Waiting Room on the 3rd floor of the Hospital    Do not eat or drink anything after 12 midnight on the night before your procedure. **IF YOUR PROCEDURE ARRIVAL TIME IS 1:00 PM OR LATER DO NOT EAT OR DRINK ANYTHING AFTER 5:00 AM ON THE MORNING OF YOUR PROCEDURE. **  You should arrive to the hospital FASTING on the day of your procedure.  Please follow important medication instructions as listed above.    Wash your chest with HIBICLENS OR AN ANTIBACTERIAL SOAP (such as Dial) on the night before and the morning of your procedure.  Please do not wear makeup, especially mascara, when arriving for your procedure.  You may be SPENDING THE NIGHT AFTER YOUR PROCEDURE.  This will be determined by your physician on the day of your procedure.   You will need someone to drive you home from the hospital due to anesthesia.   If you wear a CPAP or BIPAP machine for sleep apnea, please be sure to bring your machine with you if you are scheduled to spend the night.   [facility] has moved to an optional mask policy in most areas. If you would like your care team to wear a mask, please don't hesitate to ask.  All visitors must wait in a socially distant manner until a member of the medical team provides an update at the conclusion of the procedure/surgery.  If you are spending the night, you are allowed to have one support person spend the night with you.   Your support person should provide the staff with a valid cell phone number so that he or she can receive automated updates.      Directions to Cardiology Waiting Room  If you park in the Parking Garage:  Take elevators to the 2nd floor  Walk up ramp and turn right by Gold Elevators  Take elevator to the 3rd floor  Upon exiting the elevator, turn away from the clinic areas  Walk long fuchs around to front of hospital to area with windows overlooking [facility]  Check in at Reception Desk  OR  If family is dropping you off:  Have them drop you off at the front of the Hospital  (Near the ER, where all the flags are hung).  Take the E elevators to the 3rd floor.  Check in at the Reception Desk in the waiting room.        Post-Procedure Patient Discharge Instructions  Devices    Restrictions  No pushing, pulling, or lifting greater than 5 pounds for six weeks.    No lifting left arm higher than shoulder level for six weeks.  You will wear a sling and swathe for the first two  days to restrict arm movement.  If you tend to raise arms above shoulder level during sleep, it is asked you wear sling and swathe at night for six weeks.   No driving until your clinic wound check appointment (will be set up for 7-10 days post implant).  No submerging implant incision site in a tub, pool, or body of water for six weeks.       Wound Care  If you are discharged with a standard dressing over the incision, you may remove the dressing after 24 hours.   If you are discharged with an AQUACEL dressing, you should keep it on until your follow-up appointment in 1-2 weeks.  If there are Steri-strips (strips of tape) over your incision, leave them on until your follow-up appointment. They may begin to fall off on their own, which is normal. If there is Dermabond (clear glue) over your incision, do not scrub it off. It acts as a barrier and will eventually disappear.  You may be discharged with 5 days of oral antibiotics. Please take the full prescription until it is gone.  Do not get the incision wet for 48 hours following the procedure. You may sponge bath during this period, working around the incision. After 48 hours, you may shower, but you should still try to keep this area as dry as possible, and avoid direct water contact to the incision (allow the water to hit back of your shoulder rather than directly on the incision). Gently pat the incision dry if it does get wet.  You may take regular showers after 2 weeks, unless otherwise indicated at your follow-up visit.  Avoid using deodorants, powders, creams, lotions, etc. on your incision for 6 weeks.  If you notice unusual swelling, redness, drainage, have more device site pain, chest pain, shortness of breath, or have a fever greater than 100 degrees, call our device clinic immediately: [phone] or [phone] during normal office hours. You may call [phone] after-hours or on weekends and ask for the electrophysiologist on  call.      Activity  If you only had a battery/generator change performed, there are no postoperative activity restrictions.   If this is your first device or if you had new wires added to your existing device, then you will be discharged in a sling which you should wear continuously for 48 hours. After that, the sling should remain off during the day but should be worn at night for another 2-4 weeks (depending on how active a sleeper you are).  Do not raise your device-side arm above your shoulder for 6 weeks. Do not lift more than 5-10 lbs with your device-side arm for 6 weeks.   If you were driving prior to the procedure, you may resume driving after your first follow-up appointment (1-2 weeks). If you have a history of passing out or a history of certain arrhythmias, there may be driving restrictions unrelated to the procedure. Please clarify with your physician if this is the case.  No heavy activity with the affected arm for 6 weeks (eg. tennis, golf, bowling, aerobics, mowing the lawn, etc.).  Avoid rough contact at the device site for 6 weeks.  You may participate in sexual activity unless otherwise instructed.  You may return to work within 3-5 days unless told otherwise, provided you adhere to the above activity restrictions.    Long-Term Instructions  Keep your pacemaker or defibrillator identification card with you at all times.  If you have a defibrillator and you get shocked by the device: If you receive one shock and you feel ok, you may call [phone] or [phone] during normal office hours. You may call [phone] after-hours. If you receive one shock and you do not feel well, call Emergency Medical Services. They will take you to the nearest emergency room.  If you have a defibrillator and you get more than one shock from the device or multiple shocks in a short period of time: Call Emergency Medical Services. They will take you to the nearest emergency room.  Appliances: You may  operate any electrical device in your home, including microwaves.  Security Systems: Electromagnetic security systems can be located in the workplace, courthouses, or other high-security areas. Exposure to this type of security system has been shown to cause interference in some cases. Interference may be related to the duration of exposure and/or the distance between the device and the security device. You should be aware of the location of security systems, move through them at a normal pace, and avoid leaning or standing too close.  Metal Detectors at Airports: Metal detectors at airports can potentially interfere with pacemakers or defibrillators, although this is unlikely. Metal detectors will likely be triggered by your device and therefore at places such as airports  it will be important for you to carry your identification card for the pacemaker/defibrillator. Airport personnel will likely prefer to do a manual search.  Cellular Phones: It is unlikely that using a cellular phone will interfere with your device. It should be used with the hand opposite to the side where your device was implanted. The phone should not be carried in the shirt pocket on the same side as the device.  Specific Work Conditions: Patients who work near high-voltage lines, transmitting towers, large motors, welding equipment, or powerful magnets should discuss their specific situation with their physician. In general, remain at least two feet from external electrical equipment, verify that the equipment is properly grounded, and wear insulated gloves when using electrical devices. Leave the immediate location if lightheadedness or other symptoms develop.  MRI: Some pacemakers and defibrillators are safe in MRI scanners, while others are not. Please consult with your physician to see if you have an MRI-compatible device.  Surgery: Should you require surgery in the future, some electrosurgical devices can interfere with your device  function. You should discuss this with your surgeon before any operation.  Radiation Therapy: If you ever require radiation therapy in the future, care must be taken to avoid irradiating the device.    Long-Term Follow-Up  Your device has the ability to transmit device information from home to the doctors office using a home monitoring system.  This remote system takes the place of a doctors visit. Your device will be checked from home every 3-6 months. Every 6-12 months, you will be asked to come into the office for an in-office check.  Your device should last in the range of 6-12 years. This depends on many factors including how often it paces the heart.  When the battery is low, a generator change will be performed. This is a same-day procedure with no post-op activity restrictions, unless one of the pacemaker or defibrillator leads needs to be replaced at that time, or a new lead is added to your existing system.      Any need to reschedule or cancel procedures, or any questions regarding your procedures should be addressed directly with the Arrhythmia Department Nurses at the following phone number: [phone].

## 2024-05-29 NOTE — Clinical Note
The lead was sutured in place and was secured in place. The lead was inserted in the other location.  MARILYN

## 2024-05-29 NOTE — Clinical Note
The lead was inserted under fluorscopic guidance and thresholds were tested. The lead was inserted in the other location.  LBB

## 2024-05-29 NOTE — ANESTHESIA PREPROCEDURE EVALUATION
05/29/2024  Adriana Strong is a 79 y.o., female.    HPI  79 year-old woman with permanent AF with slow ventricular response s/p dcPPM implanted in 2022, hypertension, severe obesity, hypertension, type 2 diabetes mellitus, severe AS s/p TAVR, nonobstructive CAD, and COPD. She presented to the ER in February with heart failure symptoms. LVEF was newly depressed to 35-40% range. Coronary angiogram noted non-obstructive CAD. She was on moderate dosed carvedilol and was RV paced when temporarily set to lower rate of 30 bpm. Her settings on admission was DDD-CLS 60 with AMS base rate of 70. Plan was to stop carvedilol, reprogram her PPM to VVI 60 and reassess in clinic, for which she presents, prior to making a decision on upgrading to CRT-P.  Pre-op Assessment    I have reviewed the Patient Summary Reports.       I have reviewed the Medications.     Review of Systems  Anesthesia Hx:   History of prior surgery of interest to airway management or planning:            Denies Personal Hx of Anesthesia complications.                    Cardiovascular:     Hypertension   CAD       CHF        EF 35% with NL RV  Post TAVR                          Pulmonary:   COPD Asthma                    Renal/:  Chronic Renal Disease                Hepatic/GI:     GERD             Musculoskeletal:  Arthritis               Neurological:  TIA, CVA Neuromuscular Disease,  Headaches                                 Endocrine:  Diabetes               Physical Exam  General: Well nourished    Airway:  Mallampati: III / II  Mouth Opening: Normal  TM Distance: Normal  Tongue: Normal  Neck ROM: Normal ROM    Chest/Lungs:  Normal Respiratory Rate    Heart:  Rate: Normal        Anesthesia Plan  Type of Anesthesia, risks & benefits discussed:    Anesthesia Type: Gen ETT  Intra-op Monitoring Plan: Standard ASA Monitors and Art Line  Post Op  Pain Control Plan: multimodal analgesia  Induction:  IV  Informed Consent: Informed consent signed with the Patient and all parties understand the risks and agree with anesthesia plan.  All questions answered.   ASA Score: 4  Day of Surgery Review of History & Physical: H&P Update referred to the surgeon/provider.  Anesthesia Plan Notes: Very high risk based upon EF 35%, age 79, BMI 36 and Pulm HTN 47 with full time use of home oxygen.  Discussed case and plan in detail with Dr. Moeller  May be slightly longer (3hrs) and complex  Plan ETT general with a-line post induction.  Aggressive use of phenylephrine to maintain BP      Ready For Surgery From Anesthesia Perspective.     .

## 2024-05-30 VITALS
SYSTOLIC BLOOD PRESSURE: 165 MMHG | BODY MASS INDEX: 36.7 KG/M2 | OXYGEN SATURATION: 100 % | WEIGHT: 215 LBS | RESPIRATION RATE: 18 BRPM | HEIGHT: 64 IN | HEART RATE: 59 BPM | TEMPERATURE: 98 F | DIASTOLIC BLOOD PRESSURE: 74 MMHG

## 2024-05-30 LAB
OHS QRS DURATION: 154 MS
OHS QTC CALCULATION: 578 MS
POCT GLUCOSE: 155 MG/DL (ref 70–110)

## 2024-05-30 PROCEDURE — 25000003 PHARM REV CODE 250: Performed by: STUDENT IN AN ORGANIZED HEALTH CARE EDUCATION/TRAINING PROGRAM

## 2024-05-30 RX ORDER — CARVEDILOL 6.25 MG/1
6.25 TABLET ORAL 2 TIMES DAILY WITH MEALS
Qty: 180 TABLET | Refills: 3 | Status: SHIPPED | OUTPATIENT
Start: 2024-05-30 | End: 2025-05-30

## 2024-05-30 RX ADMIN — GABAPENTIN 600 MG: 300 CAPSULE ORAL at 08:05

## 2024-05-30 RX ADMIN — CLOPIDOGREL BISULFATE 75 MG: 75 TABLET ORAL at 08:05

## 2024-05-30 RX ADMIN — PANTOPRAZOLE SODIUM 40 MG: 40 TABLET, DELAYED RELEASE ORAL at 08:05

## 2024-05-30 RX ADMIN — FUROSEMIDE 40 MG: 40 TABLET ORAL at 08:05

## 2024-05-30 RX ADMIN — RANOLAZINE 1000 MG: 500 TABLET, EXTENDED RELEASE ORAL at 08:05

## 2024-05-30 RX ADMIN — ACETAMINOPHEN 650 MG: 325 TABLET ORAL at 07:05

## 2024-05-30 RX ADMIN — DOXYCYCLINE HYCLATE 100 MG: 100 TABLET, COATED ORAL at 08:05

## 2024-05-30 RX ADMIN — DULOXETINE HYDROCHLORIDE 20 MG: 20 CAPSULE, DELAYED RELEASE ORAL at 08:05

## 2024-05-30 RX ADMIN — ATORVASTATIN CALCIUM 10 MG: 10 TABLET, FILM COATED ORAL at 08:05

## 2024-05-30 NOTE — NURSING TRANSFER
Nursing Transfer Note      5/29/2024   9:49 PM    Nurse giving handoff:CARLOS RN  Nurse receiving handoff:CSU RN    Reason patient is being transferred: recovery care complete    Transfer To: 340    Transfer via stretcher    Transfer with 2l nc to O2, cardiac monitoring    Transported by RN        Telemetry: Box Number Y  Order for Tele Monitor? Yes        4eyes on Skin: Y    Any special needs or follow-up needed: ROUTINE    Patient belongings transferred with patient: Yes    Chart send with patient: Yes    Notified: daughter    Patient reassessed at: 05/29/24 2100

## 2024-05-30 NOTE — ANESTHESIA POSTPROCEDURE EVALUATION
Anesthesia Post Evaluation    Patient: Adriana Strong    Procedure(s) Performed: Procedure(s) (LRB):  Biventricular pacemaker upgrade (Left)    Final Anesthesia Type: general      Patient location during evaluation: PACU  Patient participation: Yes- Able to Participate  Level of consciousness: awake and alert  Post-procedure vital signs: reviewed and stable  Pain management: adequate  Airway patency: patent  RADHA mitigation strategies: Extubation while patient is awake and Multimodal analgesia  PONV status at discharge: No PONV  Anesthetic complications: no      Cardiovascular status: stable  Respiratory status: unassisted and spontaneous ventilation  Hydration status: euvolemic  Follow-up not needed.              Vitals Value Taken Time   /67 05/30/24 0817   Temp 36.6 °C (97.9 °F) 05/30/24 0723   Pulse 60 05/30/24 0723   Resp 16 05/30/24 0723   SpO2 100 % 05/30/24 0723         No case tracking events are documented in the log.      Pain/Naresh Score: Pain Rating Prior to Med Admin: 3 (5/30/2024  7:53 AM)  Pain Rating Post Med Admin: 0 (5/29/2024  7:50 PM)  Naresh Score: 9 (5/29/2024  6:30 PM)

## 2024-05-30 NOTE — HOSPITAL COURSE
Patient underwent successful implantation LBAP lead for BiV upgrade without evidence of complications intra- and post-procedure. CXR shows appropriate lead placement without acute cardiopulmonary process. ECG without evidence of device malfunction.     EP medications at discharge:  Initiation of doxycyline 100 mg BID x 5 days.  Patient was instructed to hold their anticoagulation for 5 days (when they complete their antibiotics)   She will resume her Coreg at 6.25mg bid    Patient given activity restrictions and warning signs/symptoms to monitor for, including chest pain, shortness of breath, fevers, or evidence of complications at the generator site [swelling, drainage, redness, tenderness, or warmth]. They were instructed to seek immediate medical attention if these occur and to contract the EP department. Follow up with device clinic in 1 week. No CP, SOB, or complications at the generator site on discharge. All questions and concerns answered prior to discharge.     --------------------------------------------------------------------------------

## 2024-05-30 NOTE — PLAN OF CARE
Problem: Adult Inpatient Plan of Care  Goal: Plan of Care Review  Reactivated  Goal: Patient-Specific Goal (Individualized)  Reactivated  Goal: Absence of Hospital-Acquired Illness or Injury  Reactivated  Goal: Optimal Comfort and Wellbeing  Reactivated  Goal: Readiness for Transition of Care  Reactivated     Problem: Diabetes Comorbidity  Goal: Blood Glucose Level Within Targeted Range  Reactivated     Problem: Acute Kidney Injury/Impairment  Goal: Fluid and Electrolyte Balance  Reactivated  Goal: Improved Oral Intake  Reactivated  Goal: Effective Renal Function  Reactivated     Problem: Wound  Goal: Optimal Coping  Reactivated  Goal: Optimal Functional Ability  Reactivated  Goal: Absence of Infection Signs and Symptoms  Reactivated  Goal: Improved Oral Intake  Reactivated  Goal: Optimal Pain Control and Function  Reactivated  Goal: Skin Health and Integrity  Reactivated  Goal: Optimal Wound Healing  Reactivated

## 2024-05-30 NOTE — DISCHARGE SUMMARY
[facility] - Cardiology Stepdown  Cardiac Electrophysiology  Discharge Summary      Patient Name: Adriana Strong  MRN: [id number]  Admission Date: 5/29/2024  Hospital Length of Stay: 0 days  Discharge Date and Time:  05/30/2024 7:47 AM  Attending Physician: Chencho Moeller MD    Discharging Provider: Keiry Doll MD  Primary Care Physician: Krzysztof Mcgraw MD    HPI:   79 y.o. female with permanent AF with slow ventricular response s/p dcPPM implanted in 2022, hypertension, severe obesity, hypertension, type 2 diabetes mellitus, severe AS s/p TAVR, nonobstructive CAD, and COPD. She presented to the ER in February with heart failure symptoms. LVEF was newly depressed to 35-40% range. Coronary angiogram noted non-obstructive CAD. She was on moderate dosed carvedilol and was RV paced when temporarily set to lower rate of 30 bpm. Her settings on admission was DDD-CLS 60 with AMS base rate of 70. Plan was to stop carvedilol, reprogram her PPM to VVI 60 and reassess in clinic, for which she presents, prior to making a decision on upgrading to CRT-P. In-clinic device check notes 100% RV pacing even when set to VVI 30.     In-clinic ECG is AF with RV pacing (paced QRS 160ms)    Todays ECG PENDING    Procedure(s) (LRB):  Biventricular pacemaker upgrade (Left)     Indwelling Lines/Drains at time of discharge:  Lines/Drains/Airways       None                   Hospital Course:  Patient underwent successful implantation LBAP lead for BiV upgrade without evidence of complications intra- and post-procedure. CXR shows appropriate lead placement without acute cardiopulmonary process. ECG without evidence of device malfunction.     EP medications at discharge:  Initiation of doxycyline 100 mg BID x 5 days.  Patient was instructed to hold their anticoagulation for 5 days (when they complete their antibiotics)   She will resume her Coreg at 6.25mg bid    Patient given activity restrictions and warning signs/symptoms to monitor  for, including chest pain, shortness of breath, fevers, or evidence of complications at the generator site [swelling, drainage, redness, tenderness, or warmth]. They were instructed to seek immediate medical attention if these occur and to contract the EP department. Follow up with device clinic in 1 week. No CP, SOB, or complications at the generator site on discharge. All questions and concerns answered prior to discharge.     --------------------------------------------------------------------------------    Goals of Care Treatment Preferences:  Code Status: Full Code    Living Will: Yes              Consults:     Significant Diagnostic Studies: N/A    Pending Diagnostic Studies:       None            Final Active Diagnoses:    Diagnosis Date Noted POA    PRINCIPAL PROBLEM:  HFrEF (heart failure with reduced ejection fraction) [I50.20] 05/28/2024 Unknown      Problems Resolved During this Admission:     No new Assessment & Plan notes have been filed under this hospital service since the last note was generated.  Service: Arrhythmia      Discharged Condition: stable    Disposition:     Follow Up:   Follow-up Information       DEVICE CHECK CLINIC Follow up.               Chencho Moeller MD Follow up in 3 month(s).    Specialties: Electrophysiology, Cardiology  Contact information:  [facility] [id number] [phone]                           Patient Instructions:   No discharge procedures on file.  Medications:  Reconciled Home Medications:      Medication List        START taking these medications      carvediloL 6.25 MG tablet  Commonly known as: COREG  Take 1 tablet (6.25 mg total) by mouth 2 (two) times daily with meals.     doxycycline 100 MG Cap  Commonly known as: VIBRAMYCIN  Take 1 capsule (100 mg total) by mouth every 12 (twelve) hours. for 5 days            CONTINUE taking these medications      acetaminophen 650 MG Tbsr  Commonly known as: TYLENOL  Take 1,300 mg by mouth 2 (two) times  daily as needed.     albuterol-ipratropium 2.5 mg-0.5 mg/3 mL nebulizer solution  Commonly known as: DUO-NEB  Take 3 mLs by nebulization every 4 (four) hours as needed for Wheezing or Shortness of Breath.     apixaban 5 mg Tab  Commonly known as: ELIQUIS  Take 1 tablet (5 mg total) by mouth 2 (two) times daily.     ascorbic acid (vitamin C) 500 MG tablet  Commonly known as: VITAMIN C  Take 1,000 mg by mouth once daily.     atorvastatin 10 MG tablet  Commonly known as: LIPITOR  Take 1 tablet (10 mg total) by mouth once daily.     CALTRATE 600 + D ORAL  Take 1 tablet by mouth once daily.     cimetidine 300 MG tablet  Commonly known as: TAGAMET  Take 300 mg  by mouth  13 hours prior to your procedure (at 1 am) ;  then again 7 hours prior ( at 7 am)  , and again ( at 12 pm ) upon arrival to the hospital.     clopidogreL 75 mg tablet  Commonly known as: PLAVIX  TAKE 1 TABLET BY MOUTH EVERY DAY     diphenhydrAMINE 25 mg capsule  Commonly known as: BenadryL  Take 50 mg ( 2 tablets ) by mouth 13 hours prior to your procedure (at 1 am) ;  then again 7 hours prior ( at 7 am)  , and again  ( at 12 pm ) upon arrival to the hospital.     DULoxetine 20 MG capsule  Commonly known as: CYMBALTA  Take 1 capsule (20 mg total) by mouth once daily.     empagliflozin 10 mg tablet  Commonly known as: Jardiance  Take 1 tablet (10 mg total) by mouth once daily.     fexofenadine 60 MG tablet  Commonly known as: ALLEGRA  Take 60 mg by mouth once daily.     furosemide 40 MG tablet  Commonly known as: LASIX  Take 1 tablet (40 mg total) by mouth 2 (two) times a day.     gabapentin 600 MG tablet  Commonly known as: NEURONTIN  Take 600 mg by mouth 3 (three) times daily.     INV losartan 50 MG Tab  Commonly known as: COZAAR  Take 1 tablet (50 mg total) by mouth once daily.     isosorbide mononitrate 60 MG 24 hr tablet  Commonly known as: IMDUR  Take 1 tablet (60 mg total) by mouth once daily.     LORazepam 0.5 MG tablet  Commonly known as:  ATIVAN  Take 1 tablet (0.5 mg total) by mouth every morning.     metFORMIN 500 MG tablet  Commonly known as: GLUCOPHAGE  Take 1 tablet (500 mg total) by mouth 2 (two) times daily.     montelukast 10 mg tablet  Commonly known as: SINGULAIR  Take 10 mg by mouth once daily.     nitroGLYCERIN 0.4 MG SL tablet  Commonly known as: NITROSTAT  Place 1 tablet (0.4 mg total) under the tongue every 5 (five) minutes as needed for Chest pain.     pantoprazole 40 MG tablet  Commonly known as: PROTONIX  Take 1 tablet (40 mg total) by mouth once daily.     predniSONE 50 MG Tab  Commonly known as: DELTASONE  Take 50 mg ( 1 tablet ) by mouth 13 hours prior to your procedure (at 1 am)  ;  then again 7 hours prior ( at 7 am)  , and again  ( at 12 pm ) upon arrival to the hospital.     ranolazine 1,000 mg Tb12  Commonly known as: RANEXA  Take 1 tablet (1,000 mg total) by mouth 2 (two) times daily.     spironolactone 25 MG tablet  Commonly known as: ALDACTONE  Take 1 tablet (25 mg total) by mouth once daily.     SYMBICORT 160-4.5 mcg/actuation Hfaa  Generic drug: budesonide-formoterol 160-4.5 mcg  Inhale 1 puff into the lungs 2 (two) times daily.     WOMEN'S 50 PLUS MULTIVITAMIN ORAL  Take 1 tablet by mouth once daily.              Time spent on the discharge of patient: 45 minutes    Keiry Doll MD  Cardiac Electrophysiology  [facility] - Cardiology Stepdown

## 2024-05-30 NOTE — DISCHARGE INSTRUCTIONS
Medication instructions:    Complete your 5 days of antibiotics  If you are on a blood thinner (examples: apixiban [Eliquis], rivaroxaban [Xarelto], dabigatran [Pradaxa], or enoxaparin [Lovenox]), do not take your blood thinner for the next 5 days after your procedure. You can resume after 5 days post-procedure (i.e., when you complete your antibiotics). If you are on Coumadin you can continue to take it the day of your procedure  Resume Coreg 6.25mg bid  Pain control: you can take up to Tylenol 1000 mg every 6 hours as needed or (if you do not have kidney disease) ibuprofen 600 mg every 6 hours as needed as needed for pain control (if you do not have kidney disease). If unsure, please contact your primary care physician for recommendations.      Activity restrictions & precautions:    Sling & arm instructions:  Wear your sling for 48 hours. After 48 hours, you can take your arm out of your sling.   You must wear your sling at night when you sleep for 6 weeks after your procedure  Do not lift >5 lbs for 2 weeks.  Do not raise your elbow above your shoulder on the same side as your device for 6 weeks.     Surgical site   Dressing (see images below)  **Note: these are general rules to follow unless instructed otherwise** - see images below  If you have a brown rectangular gel bandage (A.K.A. Aquacel Ag dressing) over your surgical incision do not remove it. This will be removed at your device clinic follow up appointment in 1 week.  If you have a square light brown bandage (A.K.A Mepilex dressing) you should remove in 48 hours after your procedure.  If you have a pressure dressing (white elastic tape with gauze underneath or a very large bandage that covers your left chest and is shaped like a triangle) over your surgical site, this should be removed the morning after your procedure unless instructed otherwise.  Do not place any creams or ointments over the surgical site. This could effect the integrity of the  Dermabond glue.  You can shower after 24 hours post-procedure, but do not let the jet directly hit your pocket site for at least 2 weeks. Recommend showering with your back to the shower head.   Do not reach your arm (on the same side as your device) around your back during showering for 6 weeks.  Do not submerge your surgical incision site under water (swimming, bathing if you submerge the actual surgical site) for at least 6 week. It is imperative that the surgical site is completely healed prior to doing so    Follow up in device clinic in 1 week to check your incision and device function  Please contact the electrophysiology clinic if you have any questions or if you experience: potential surgical/pocket site complications (pain, swelling, bleeding, drainage), fevers, chest pain/shortness of breath, or for any other concerns.

## 2024-05-31 LAB
OHS QRS DURATION: 104 MS
OHS QTC CALCULATION: 606 MS

## 2024-06-02 ENCOUNTER — HOSPITAL ENCOUNTER (EMERGENCY)
Facility: HOSPITAL | Age: 79
Discharge: HOME | End: 2024-06-02
Attending: STUDENT IN AN ORGANIZED HEALTH CARE EDUCATION/TRAINING PROGRAM
Payer: MEDICARE

## 2024-06-02 VITALS
WEIGHT: 215 LBS | HEART RATE: 60 BPM | RESPIRATION RATE: 18 BRPM | TEMPERATURE: 98 F | SYSTOLIC BLOOD PRESSURE: 151 MMHG | DIASTOLIC BLOOD PRESSURE: 67 MMHG | OXYGEN SATURATION: 97 % | BODY MASS INDEX: 36.9 KG/M2

## 2024-06-02 DIAGNOSIS — Z98.890 POST-OPERATIVE STATE: Primary | ICD-10-CM

## 2024-06-02 PROCEDURE — 99282 EMERGENCY DEPT VISIT SF MDM: CPT

## 2024-06-02 NOTE — ED PROVIDER NOTES
Encounter Date: 6/2/2024       History     Chief Complaint   Patient presents with    Pacemaker Problem     Site where pacemaker was placed last week is swollen per patient; denies fever/drainage/bleeding/CP or SOB     Patient is an 79-year-old female w/ a PMHx significant for AFib on Plavix, aortic stenosis s/p TAVR, CAD, CHF, and recent permanent pace maker placement on 05/29/2024.  Patient presents to the ED today w/concerns of problem at the site of her pacemaker placement.  Patient states that she has had slightly increased swelling and bruising around the area since it was placed.  Patient has mild tenderness to palpation over the incision site but has no surrounding pain.  Patient has taken multiple photos over the previous days of her pacemaker placement site, which look largely unchanged.  Patient denies f/c, headaches, chest pains, palpitations, SOB, abdominal pain, N/V/D, lower extremity edema/erythema.        The history is provided by the patient.     Review of patient's allergies indicates:   Allergen Reactions    Celebrex [celecoxib] Shortness Of Breath    Ciprofloxacin Swelling     lip    Dicyclomine Hives    Adhesive Dermatitis    Avelox [moxifloxacin] Swelling    Cilostazol Other (See Comments)     Elevates blood pressure    Eryc [erythromycin] Other (See Comments)     unknown    Iodine and iodide containing products Hives    Keflex [cephalexin] Hives    Meclomen      rashes    Meloxicam      Ear ringing    Metoclopramide Other (See Comments)     High blood pressure    Sulfa (sulfonamide antibiotics) Itching     Past Medical History:   Diagnosis Date    Acute on chronic diastolic (congestive) heart failure 11/20/2019    Anticoagulant long-term use     on Plavix since May 2015    Anxiety     Arthritis     Asthma     Atrial fibrillation     Atrial fibrillation with RVR 10/19/2020    Cataracts, bilateral     Chest pain, atypical     Chronic atrial fibrillation with rapid ventricular response  06/23/2017    Colon polyp     Coronary artery disease     Diabetes mellitus     Dry eyes     Esophageal erosions     General anesthetics causing adverse effect in therapeutic use     GERD (gastroesophageal reflux disease)     Heart murmur     Hemorrhoid     High cholesterol     Hypertension     Irritable bowel syndrome     Paroxysmal atrial fibrillation 10/30/2020    Persistent atrial fibrillation 02/10/2020    Shingles 2015    Stenosis of aortic and mitral valves     Stroke     TIA (transient ischemic attack)     Use of cane as ambulatory aid      Past Surgical History:   Procedure Laterality Date    ABDOMINAL SURGERY      stents to SMA    angiocele      2007, 2014    AORTIC VALVE REPLACEMENT N/A 11/19/2019    Procedure: Replacement-valve-aortic;  Surgeon: Jack Capone MD;  Location: [facility] CATH LAB;  Service: Cardiology;  Laterality: N/A;    BREAST SURGERY      left--- a lump--- no cancer    CARDIAC VALVE SURGERY      COLONOSCOPY  2014    COLONOSCOPY N/A 3/14/2018    Procedure: COLONOSCOPY;  Surgeon: Efren Kemp MD;  Location: [facility] ENDO ([address] [place] [address]);  Service: Endoscopy;  Laterality: N/A;  ok to hold Plavix 5 days prior to procedure per Dr RESHMA Hernandez     ok per Dr Kemp to hold Eliquis 2 days prior to procedure (see telephone encounter dated-2/7/18)    CORONARY ANGIOGRAPHY N/A 2/15/2024    Procedure: ANGIOGRAM, CORONARY ARTERY;  Surgeon: Jos Baptiste MD;  Location: [facility] CATH LAB;  Service: Cardiology;  Laterality: N/A;    HERNIA REPAIR      HYSTERECTOMY  partial    1982 partial hysterectomy    IMPLANTATION OF BIVENTRICULAR PERMANENT PACEMAKER AS UPGRADE TO EXISTING PACEMAKER Left 5/29/2024    Procedure: Biventricular pacemaker upgrade;  Surgeon: Chencho Moeller MD;  Location: [facility] EP LAB;  Service: Cardiology;  Laterality: Left;  CHF, AF,  CRTP ugrd, BIO, MAC, NV, 3prep ** BIO dcPPM in situ/ Contrast Prep**    LEFT HEART CATHETERIZATION Right 11/5/2019    Procedure: Left heart cath;  Surgeon: Jack Capone MD;   Location: [facility] CATH LAB;  Service: Cardiology;  Laterality: Right;    left nasal polyp      left neck lymph node      nose polyp      PHLEBOGRAPHY Left 5/1/2024    Procedure: Venogram;  Surgeon: Chencho Moeller MD;  Location: [facility] EP LAB;  Service: Cardiology;  Laterality: Left;  HF, Venogram ( Lt arm) , MA, 3 prep ** BIO dcPPM in situ/ Contrast Prep**    right hip fatty mass tissue      stent to small intestine      SUPERIOR VENA CAVA ANGIOPLASTY / STENTING      TONSILLECTOMY      TOTAL ABDOMINAL HYSTERECTOMY      2014    TOTAL KNEE ARTHROPLASTY Bilateral     TREATMENT OF CARDIAC ARRHYTHMIA N/A 2/10/2020    Procedure: CARDIOVERSION;  Surgeon: Jayy Parrish MD;  Location: [facility] EP LAB;  Service: Cardiology;  Laterality: N/A;  AF, PEDRO, DCCV, MAC, DM, 3 Prep    TREATMENT OF CARDIAC ARRHYTHMIA N/A 5/15/2020    Procedure: CARDIOVERSION;  Surgeon: Hany Bee MD;  Location: [facility] EP LAB;  Service: Cardiology;  Laterality: N/A;  AF, PEDRO, DCCV, MAC, DM, 3 Prep    UPPER GASTROINTESTINAL ENDOSCOPY  2014     Family History   Problem Relation Name Age of Onset    Heart attack Mother      Stroke Father      Heart failure Brother      Colon cancer Neg Hx      Inflammatory bowel disease Neg Hx       Social History     Tobacco Use    Smoking status: Never    Smokeless tobacco: Never   Substance Use Topics    Alcohol use: No    Drug use: Never     Review of Systems    Physical Exam     Initial Vitals [06/02/24 1227]   BP Pulse Resp Temp SpO2   (!) 151/67 60 18 97.9 °F (36.6 °C) 97 %      MAP       --         Physical Exam    Nursing note and vitals reviewed.  Constitutional: She appears well-developed and well-nourished. No distress.   HENT:   Head: Normocephalic and atraumatic.   Nose: Nose normal.   Eyes: Conjunctivae and EOM are normal.   Neck: Neck supple.   Normal range of motion.  Cardiovascular:  Normal rate, normal heart sounds and intact distal pulses.           Pulmonary/Chest: Breath sounds normal. No respiratory  distress.   Abdominal: Abdomen is soft. Bowel sounds are normal. She exhibits no distension. There is no abdominal tenderness.   Musculoskeletal:         General: Normal range of motion.      Cervical back: Normal range of motion and neck supple.      Comments: Well healing incision above pacemaker placement w/mild amount of edema/ecchymoses but no significant erythema, crepitus, fluctuance, drainage, or other signs concerning for infection.     Neurological: She is alert and oriented to person, place, and time. No sensory deficit.   Skin: Skin is warm and dry. Capillary refill takes less than 2 seconds.   Psychiatric: She has a normal mood and affect. Her behavior is normal. Judgment and thought content normal.         ED Course   Procedures  Labs Reviewed - No data to display       Imaging Results    None          Medications - No data to display  Medical Decision Making  DDx includes:  Abscess v. cellulitis v. soft tissue injury v. normal postoperative changes    Given patient's benign physical exam and findings consistent w/ normal postoperative changes, I suspect that patient's most likely diagnosis is mild soft tissue injury 2/2 operative intervention w/ normal healing.  Patient has f/u w/ Cardiology on Wednesday and will discuss these matters further during her appointment.  Patient given instructions for home care and return precautions.    Amount and/or Complexity of Data Reviewed  External Data Reviewed: notes.              Attending Attestation:   Physician Attestation Statement for Resident:  As the supervising MD   Physician Attestation Statement: I have personally seen and examined this patient.   I agree with the above history.  -:   As the supervising MD I agree with the above PE.     As the supervising MD I agree with the above treatment, course, plan, and disposition.   -: Pt's surgical site appears normal for the post-operative state. No redness, warmth, fluctuance, or drainage. Minimal TTP. No  fever. She does not have an infection there. She only came in because her daughter thought she should get it looked at again. They have a follow up appt Wednesday. I gave them strict return precautions for signs/symptoms of infection.                                           Clinical Impression:  Final diagnoses:  [Z98.890] Post-operative state (Primary)          ED Disposition Condition    Discharge           ED Prescriptions    None       Follow-up Information    None          Colby Acosta MD  Resident  06/02/24 [id number]

## 2024-06-02 NOTE — DISCHARGE INSTRUCTIONS
You were evaluated today for concerns of pacemaker complications.  We did not find anything significantly concerning for infection, inflammation, or excessive edema.    Please be sure to f/u w/ your cardiologist in attend all appointments as scheduled for further monitoring of your pacemaker.

## 2024-06-05 ENCOUNTER — CLINICAL SUPPORT (OUTPATIENT)
Dept: CARDIOLOGY | Facility: HOSPITAL | Age: 79
End: 2024-06-05
Attending: INTERNAL MEDICINE
Payer: MEDICARE

## 2024-06-05 DIAGNOSIS — I50.23 ACUTE ON CHRONIC SYSTOLIC HEART FAILURE: ICD-10-CM

## 2024-06-05 DIAGNOSIS — Z95.0 CARDIAC PACEMAKER IN SITU: ICD-10-CM

## 2024-06-05 DIAGNOSIS — I48.91 ATRIAL FIBRILLATION WITH SLOW VENTRICULAR RESPONSE: ICD-10-CM

## 2024-06-05 DIAGNOSIS — I48.21 PERMANENT ATRIAL FIBRILLATION: ICD-10-CM

## 2024-06-06 LAB
OHS QRS DURATION: 120 MS
OHS QTC CALCULATION: 504 MS

## 2024-06-10 DIAGNOSIS — I48.91 ATRIAL FIBRILLATION WITH SLOW VENTRICULAR RESPONSE: Primary | ICD-10-CM

## 2024-06-10 DIAGNOSIS — I50.23 ACUTE ON CHRONIC SYSTOLIC HEART FAILURE: Primary | ICD-10-CM

## 2024-06-14 LAB
OHS CV DC REMOTE DEVICE TYPE: NORMAL
OHS CV RV PACING PERCENT: 100 %

## 2024-06-29 ENCOUNTER — CLINICAL SUPPORT (OUTPATIENT)
Dept: CARDIOLOGY | Facility: HOSPITAL | Age: 79
End: 2024-06-29
Attending: INTERNAL MEDICINE
Payer: MEDICARE

## 2024-06-29 DIAGNOSIS — Z95.0 PRESENCE OF CARDIAC PACEMAKER: ICD-10-CM

## 2024-06-29 DIAGNOSIS — I44.2 ATRIOVENTRICULAR BLOCK, COMPLETE: ICD-10-CM

## 2024-06-29 PROCEDURE — 93294 REM INTERROG EVL PM/LDLS PM: CPT | Mod: ,,, | Performed by: INTERNAL MEDICINE

## 2024-06-29 PROCEDURE — 93296 REM INTERROG EVL PM/IDS: CPT | Performed by: INTERNAL MEDICINE

## 2024-07-10 ENCOUNTER — TELEPHONE (OUTPATIENT)
Dept: CARDIOLOGY | Facility: CLINIC | Age: 79
End: 2024-07-10
Payer: MEDICARE

## 2024-07-10 NOTE — TELEPHONE ENCOUNTER
----- Message from Harley Espino MD sent at 7/9/2024  9:18 PM CDT -----  Regarding: RE: bladder infection  Yes, it can be stopped if she is having frequent UTIs. I'll give her a call, thank you.  ----- Message -----  From: Deborah Lutz RN  Sent: 7/8/2024   4:56 PM CDT  To: Deborah Lutz RN; Harley Espino MD  Subject: bladder infection                                Pt states that she has recurrent bladder infections and feels that it is due to the Jardiance. Currently under treatment for it x 1 day. Believes that the last bladder infection was less that 3 mths ago.    Please advise  ----- Message -----  From: Arleen Mccarty RN  Sent: 7/8/2024  11:26 AM CDT  To: Deborah Lutz RN  Subject: FW: Infection                                      ----- Message -----  From: Chani Mar  Sent: 7/8/2024  10:01 AM CDT  To: Arleen Mccarty RN  Subject: Infection                                        Patient say empagliflozin (JARDIANCE) 10 mg tablet is giving her an infection. Please call back @ [phone]. Thank you Chani

## 2024-07-25 LAB
OHS CV BIV PACING PERCENT: 100 %
OHS CV DC REMOTE DEVICE TYPE: NORMAL
OHS CV RV PACING PERCENT: 100 %

## 2024-08-14 RX ORDER — CLOPIDOGREL BISULFATE 75 MG/1
75 TABLET ORAL DAILY
Qty: 90 TABLET | Refills: 3 | Status: SHIPPED | OUTPATIENT
Start: 2024-08-14

## 2024-08-23 ENCOUNTER — TELEPHONE (OUTPATIENT)
Dept: CARDIOLOGY | Facility: CLINIC | Age: 79
End: 2024-08-23
Payer: MEDICARE

## 2024-08-27 ENCOUNTER — PATIENT MESSAGE (OUTPATIENT)
Dept: CARDIOLOGY | Facility: CLINIC | Age: 79
End: 2024-08-27
Payer: MEDICARE

## 2024-08-27 DIAGNOSIS — I48.91 ATRIAL FIBRILLATION WITH SLOW VENTRICULAR RESPONSE: ICD-10-CM

## 2024-08-27 DIAGNOSIS — I50.23 ACUTE ON CHRONIC SYSTOLIC HEART FAILURE: Primary | ICD-10-CM

## 2024-08-27 DIAGNOSIS — Z95.2 S/P TAVR (TRANSCATHETER AORTIC VALVE REPLACEMENT): ICD-10-CM

## 2024-08-27 DIAGNOSIS — I25.111 CORONARY ARTERY DISEASE INVOLVING NATIVE CORONARY ARTERY OF NATIVE HEART WITH ANGINA PECTORIS WITH DOCUMENTED SPASM: ICD-10-CM

## 2024-08-29 ENCOUNTER — HOSPITAL ENCOUNTER (EMERGENCY)
Facility: HOSPITAL | Age: 79
Discharge: HOME | End: 2024-08-29
Attending: EMERGENCY MEDICINE
Payer: MEDICARE

## 2024-08-29 VITALS
TEMPERATURE: 98 F | WEIGHT: 207 LBS | DIASTOLIC BLOOD PRESSURE: 75 MMHG | RESPIRATION RATE: 18 BRPM | HEART RATE: 60 BPM | HEIGHT: 64 IN | OXYGEN SATURATION: 100 % | BODY MASS INDEX: 35.34 KG/M2 | SYSTOLIC BLOOD PRESSURE: 182 MMHG
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
## 2024-08-29 DIAGNOSIS — S41.112A SKIN TEAR OF LEFT UPPER ARM WITHOUT COMPLICATION, INITIAL ENCOUNTER: ICD-10-CM

## 2024-08-29 DIAGNOSIS — S09.90XA MINOR HEAD INJURY, INITIAL ENCOUNTER: Primary | ICD-10-CM

## 2024-08-29 DIAGNOSIS — S00.03XA SCALP HEMATOMA, INITIAL ENCOUNTER: ICD-10-CM

## 2024-08-29 DIAGNOSIS — R52 PAIN: ICD-10-CM

## 2024-08-29 DIAGNOSIS — M25.551 ACUTE RIGHT HIP PAIN: ICD-10-CM

## 2024-08-29 PROCEDURE — 99284 EMERGENCY DEPT VISIT MOD MDM: CPT | Mod: 25,ER

## 2024-08-29 PROCEDURE — 25000003 PHARM REV CODE 250: Mod: ER | Performed by: EMERGENCY MEDICINE

## 2024-08-29 RX ORDER — ACETAMINOPHEN 500 MG
1000 TABLET ORAL
Status: COMPLETED | OUTPATIENT
Start: 2024-08-29 | End: 2024-08-29

## 2024-08-29 RX ADMIN — ACETAMINOPHEN 1000 MG: 500 TABLET ORAL at 11:08

## 2024-08-30 NOTE — ED NOTES
Just check in on Pt said her head I hurting recheck pain scale. I advised nurse, who has already advised the Doctor.

## 2024-08-30 NOTE — ED PROVIDER NOTES
Chief Complaint   Patient presents with    Fall     Pt reports fell from standing position approx 30-40 mins pta. Reports hit head and on blood thinners denies loc. Pt has skin tears to right forearm and oozing. Pt denies n/v, visual changes or head pain. + dizziness         HPI:  HISTORY OF PRESENT ILLNESS:  This is Adriana Strong 79 y.o.  who   Past Medical History Pertinent Negatives:   Diagnosis Date Noted    Cancer 11/19/2019    Encounter for blood transfusion 11/19/2019    Thyroid disease 11/19/2019    who comes into the emergency department today after a fall at home.  Pt was in a small bathroom with her walker, got tripped up and fell onto her right side.  She hit her head. She is on a blood thinner.  She had no LOC, no vomiting after the event.  She has skin tears to the right arm as well.  No active bleeding.  She has some right hip pain as well.          ROS    Constitutional: No fever, no chills.  Eyes: No discharge. No pain.  HENT: No nasal drainage. No ear ache. No sore throat.  Cardiovascular: No palpitations.  Respiratory: No cough.  Gastrointestinal: No abdominal pain, no vomiting. No diarrhea.  Genitourinary: No incontinence.  Musculoskeletal:  See above.  Skin: No rashes, no lesions.  Neurological: No headache, no focal weakness.      History provided from patient.     Nurses notes reviewed.   Allergies reviewed.   PMH/SOC Hx reviewed    Past Medical History:   Diagnosis Date    Acute on chronic diastolic (congestive) heart failure 11/20/2019    Anticoagulant long-term use     on Plavix since May 2015    Anxiety     Arthritis     Asthma     Atrial fibrillation     Atrial fibrillation with RVR 10/19/2020    Cataracts, bilateral     Chest pain, atypical     Chronic atrial fibrillation with rapid ventricular response 06/23/2017    Colon polyp     Coronary artery disease     Diabetes mellitus     Dry eyes     Esophageal erosions     General anesthetics causing adverse effect in therapeutic use      GERD (gastroesophageal reflux disease)     Heart murmur     Hemorrhoid     High cholesterol     Hypertension     Irritable bowel syndrome     Paroxysmal atrial fibrillation 10/30/2020    Persistent atrial fibrillation 02/10/2020    Shingles 2015    Stenosis of aortic and mitral valves     Stroke     TIA (transient ischemic attack)     Use of cane as ambulatory aid        Past Surgical History:   Procedure Laterality Date    ABDOMINAL SURGERY      stents to SMA    angiocele      2007, 2014    AORTIC VALVE REPLACEMENT N/A 11/19/2019    Procedure: Replacement-valve-aortic;  Surgeon: Jack Capone MD;  Location: [facility] CATH LAB;  Service: Cardiology;  Laterality: N/A;    BREAST SURGERY      left--- a lump--- no cancer    CARDIAC VALVE SURGERY      COLONOSCOPY  2014    COLONOSCOPY N/A 3/14/2018    Procedure: COLONOSCOPY;  Surgeon: Efren Kemp MD;  Location: [facility] ENDO ([address] [place] [address]);  Service: Endoscopy;  Laterality: N/A;  ok to hold Plavix 5 days prior to procedure per Dr RESHMA Hernandez     ok per Dr Kemp to hold Eliquis 2 days prior to procedure (see telephone encounter dated-2/7/18)    CORONARY ANGIOGRAPHY N/A 2/15/2024    Procedure: ANGIOGRAM, CORONARY ARTERY;  Surgeon: Jos Baptiste MD;  Location: [facility] CATH LAB;  Service: Cardiology;  Laterality: N/A;    HERNIA REPAIR      HYSTERECTOMY  partial    1982 partial hysterectomy    IMPLANTATION OF BIVENTRICULAR PERMANENT PACEMAKER AS UPGRADE TO EXISTING PACEMAKER Left 5/29/2024    Procedure: Biventricular pacemaker upgrade;  Surgeon: Chencho Moeller MD;  Location: [facility] EP LAB;  Service: Cardiology;  Laterality: Left;  CHF, AF,  CRTP ugrd, BIO, MAC, FL, 3prep ** BIO dcPPM in situ/ Contrast Prep**    LEFT HEART CATHETERIZATION Right 11/5/2019    Procedure: Left heart cath;  Surgeon: Jack Capone MD;  Location: [facility] CATH LAB;  Service: Cardiology;  Laterality: Right;    left nasal polyp      left neck lymph node      nose polyp      PHLEBOGRAPHY Left 5/1/2024    Procedure:  "Venogram;  Surgeon: Chencho Moeller MD;  Location: [facility] EP LAB;  Service: Cardiology;  Laterality: Left;  HF, Venogram ( Lt arm) , OR, 3 prep ** BIO dcPPM in situ/ Contrast Prep**    right hip fatty mass tissue      stent to small intestine      SUPERIOR VENA CAVA ANGIOPLASTY / STENTING      TONSILLECTOMY      TOTAL ABDOMINAL HYSTERECTOMY      2014    TOTAL KNEE ARTHROPLASTY Bilateral     TREATMENT OF CARDIAC ARRHYTHMIA N/A 2/10/2020    Procedure: CARDIOVERSION;  Surgeon: Jayy Parrish MD;  Location: [facility] EP LAB;  Service: Cardiology;  Laterality: N/A;  AF, PEDRO, DCCV, MAC, DM, 3 Prep    TREATMENT OF CARDIAC ARRHYTHMIA N/A 5/15/2020    Procedure: CARDIOVERSION;  Surgeon: Hany Bee MD;  Location: [facility] EP LAB;  Service: Cardiology;  Laterality: N/A;  AF, PEDRO, DCCV, MAC, DM, 3 Prep    UPPER GASTROINTESTINAL ENDOSCOPY  2014       Social History     Tobacco Use    Smoking status: Never    Smokeless tobacco: Never   Substance Use Topics    Alcohol use: No    Drug use: Never       Family History   Problem Relation Name Age of Onset    Heart attack Mother      Stroke Father      Heart failure Brother      Colon cancer Neg Hx      Inflammatory bowel disease Neg Hx               BP (!) 182/75 (BP Location: Right arm, Patient Position: Lying)   Pulse 60   Temp 97.8 °F (36.6 °C) (Oral)   Resp 18   Ht 5' 4" (1.626 m)   Wt 93.9 kg (207 lb)   LMP 01/21/1973 (LMP Unknown)   SpO2 100%   BMI 35.53 kg/m²   Physical Exam    General Appearance: The patient is a well-developed 79 y.o. female  who is alert, has no immediate need for airway protection and no signs of toxicity.  No acute distress.   HEENT: Head: Normocephalic, No bogginess to palpation of the scalp.  There is a  hematoma noted to the right parietal scalp.  Nontender to palpation over the frontal, superior and inferior orbits, nasal bones, zygomatic arches, maxilla and mandible bilaterally.       Eyes: Pupils equal and round no pallor or injection. Extra " ocular movements intact. No nystagmus.      Nose: No deformity. No septal hematoma. Turbinates normal.      Mouth: Mucous membranes are moist. Oropharynx clear.  Neck:The c-spine has no midline tenderness to palpation, no paraspinal tenderness.   Respiratory: There are no retractions, lungs are clear to auscultation. Chest wall has no ecchymosis, there is no tenderness to palpation along the upper chest wall bilaterally, no tenderness along the clavicles.  No crepitus.  Cardiovascular: Regular rate and rhythm. No murmurs, rubs or gallops.  Gastrointestinal:  Abdomen is soft, no ecchymosis, non-tender, no masses, bowel sounds normal.  Neurological: CN II-XII grossly intact. No focal weakness.   Skin: Warm and dry, no rashes, no lesions, she has 2 large skin tears to the right arm.   Musculoskeletal: There is no tenderness to palpation down the midline of the T, L, sacral spine.  No  paraspinal tenderness. There is no deformity noted to the upper extremities. Nontender to palpation down the length of the upper extremities and the bony aspects. Full range of motion. Capillary refill less than 2 seconds bilaterally. Nontender to palpation of the hips. No deformity to the hips, no deformity lower extremities..                    CT Hip Without Contrast Right   Final Result      As above      All CT scans at this facility are performed  using dose modulation techniques as appropriate to performed exam including the following:  automated exposure control; adjustment of mA and/or kV according to the patients size (this includes techniques or standardized protocols for targeted exams where dose is matched to indication/reason for exam: i.e. extremities or head);  iterative reconstruction technique.         Electronically signed by: Jed Bales   Date:    08/29/2024   Time:    22:56      X-Ray Hip 2 or 3 views Right with Pelvis when performed   Final Result      As above         Electronically signed by: Jed Bales    Date:    08/29/2024   Time:    21:50      CT Head Without Contrast   Final Result      No intracranial hemorrhage mass effect or midline shift.  Mild right lateral scalp soft tissue swelling noted.      All CT scans   are performed using dose optimization techniques including the following: automated exposure control; adjustment of the mA and/or kV; use of iterative reconstruction technique.  Dose modulation was employed for ALARA by means of: Automated exposure control; adjustment of the mA and/or kV according to patient size (this includes techniques or standardized protocols for targeted exams where dose is matched to indication/reason for exam; i.e. extremities or head); and/or use of iterative reconstructive technique.         Electronically signed by: Jed Bales   Date:    08/29/2024   Time:    21:31      X-Ray Wrist Complete Right   Final Result      No fracture or dislocation.         Electronically signed by: Bhanu Edwards MD   Date:    08/29/2024   Time:    21:33      X-Ray Hand 3 View Right   Final Result      No acute bony changes.         Electronically signed by: Bhanu Edwards MD   Date:    08/29/2024   Time:    21:32                 Medical Decision Making  Problems Addressed:  Acute right hip pain: complicated acute illness or injury  Fall: complicated acute illness or injury  Minor head injury, initial encounter: complicated acute illness or injury that poses a threat to life or bodily functions  Pain: complicated acute illness or injury  Scalp hematoma, initial encounter: complicated acute illness or injury    Amount and/or Complexity of Data Reviewed  Radiology: ordered and independent interpretation performed.     Details: CT head: no intracranial hemorrhage  XR hip: no fracture  XR wrist: no fracture   XR hand: No fracture     Risk  OTC drugs.  Prescription drug management.    Continue home meds   Medications   acetaminophen tablet 1,000 mg (1,000 mg Oral Given 8/29/24 1804)          DIFFERENTIAL DIAGNOSIS: After history and physical exam a differential diagnosis was considered, but was not limited to,  intracranial, spinal, intrathoracic and intra-abdominal injuries, strain, sprain, fracture.     Orders Placed This Encounter   Procedures    CT Head Without Contrast    X-Ray Hip 2 or 3 views Right with Pelvis when performed    X-Ray Hand 3 View Right    X-Ray Wrist Complete Right    CT Hip Without Contrast Right     MDM CONT:   Adriana Strong presents to the emergency department today with after a fall. CT scan of the head shows no acute abnormality requiring admission.   The pt has clear breath sounds bilaterally I am not worried about pneumothorax.  No signs of bruising.  No abdominal pain.  No midline tenderness along the C, T, L, sacral spine.  No need for admission at this time.  Skin tears dressed with mepelex and wound care discussed. They will follow up with their PCP. Fall precautions given.        After taking into careful account the historical factors and physical exam findings of the patient's presentation today, in conjunction with the empirical and objective data obtained on ED workup, no acute emergent medical condition has been identified. The patient appears to be low risk for an emergent medical condition and I feel it is safe and appropriate at this time for the patient to be discharged to follow-up as detailed in their discharge instructions for reevaluation and possible continued outpatient workup and management. Regardless, an unremarkable evaluation in the ED does not preclude the development or presence of a serious or life threatening condition. As such, patient was instructed to return immediately for any worsening or change in current symptoms. Precautions for return discussed at length.  Discharge and follow-up instructions discussed with the patient who expressed understanding and willingness to comply with my recommendations.    Voice recognition software  utilized in this note      Impression       The primary encounter diagnosis was Minor head injury, initial encounter. Diagnoses of Pain, Fall, Scalp hematoma, initial encounter, Acute right hip pain, and Skin tear of left upper arm without complication, initial encounter were also pertinent to this visit.     Follow-up Information       Krzysztof Mcgraw MD. Schedule an appointment as soon as possible for a visit in 1 week.    Specialty: Family Medicine  Contact information:  [address]  [address]  [place] [address]  [phone]                             Discharge Medication List as of 8/29/2024 11:02 PM                  Attila Burciaga MD  08/30/24 0135

## 2024-08-30 NOTE — ED NOTES
Right arm skin tears x2, cleaned with normal saline, applied mepelix gauze to area. Bleeding controlled.

## 2024-08-30 NOTE — DISCHARGE INSTRUCTIONS
You have had a minor head injury, you may experience concussion symptoms such as nausea, headache, confusion and this is normal.  If you have nonstop vomiting, severe pain, unequal pupils, change in her mental state please return to emergency department for reevaluation.  Keep your wounds clean and dry. Follow up with your primary care physician in one week.  Refer to the additional material provided for further information including when to return to the emergency department.

## 2024-08-31 ENCOUNTER — HOSPITAL ENCOUNTER (EMERGENCY)
Facility: HOSPITAL | Age: 79
Discharge: HOME | End: 2024-08-31
Attending: EMERGENCY MEDICINE
Payer: MEDICARE

## 2024-08-31 VITALS
HEART RATE: 60 BPM | OXYGEN SATURATION: 100 % | SYSTOLIC BLOOD PRESSURE: 113 MMHG | DIASTOLIC BLOOD PRESSURE: 84 MMHG | WEIGHT: 207 LBS | BODY MASS INDEX: 35.34 KG/M2 | HEIGHT: 64 IN | TEMPERATURE: 98 F | RESPIRATION RATE: 13 BRPM

## 2024-08-31 DIAGNOSIS — T14.8XXA SKIN ABRASION: Primary | ICD-10-CM

## 2024-08-31 LAB
BASOPHILS # BLD AUTO: 0.04 K/UL (ref 0–0.2)
BASOPHILS NFR BLD: 0.6 % (ref 0–1.9)
DIFFERENTIAL METHOD BLD: ABNORMAL
EOSINOPHIL # BLD AUTO: 0 K/UL (ref 0–0.5)
EOSINOPHIL NFR BLD: 0.6 % (ref 0–8)
ERYTHROCYTE [DISTWIDTH] IN BLOOD BY AUTOMATED COUNT: 15 % (ref 11.5–14.5)
HCT VFR BLD AUTO: 34.5 % (ref 37–48.5)
HGB BLD-MCNC: 10.7 G/DL (ref 12–16)
IMM GRANULOCYTES # BLD AUTO: 0.03 K/UL (ref 0–0.04)
IMM GRANULOCYTES NFR BLD AUTO: 0.5 % (ref 0–0.5)
LYMPHOCYTES # BLD AUTO: 1.6 K/UL (ref 1–4.8)
LYMPHOCYTES NFR BLD: 24.2 % (ref 18–48)
MCH RBC QN AUTO: 30.6 PG (ref 27–31)
MCHC RBC AUTO-ENTMCNC: 31 G/DL (ref 32–36)
MCV RBC AUTO: 99 FL (ref 82–98)
MONOCYTES # BLD AUTO: 0.8 K/UL (ref 0.3–1)
MONOCYTES NFR BLD: 11.6 % (ref 4–15)
NEUTROPHILS # BLD AUTO: 4.2 K/UL (ref 1.8–7.7)
NEUTROPHILS NFR BLD: 62.5 % (ref 38–73)
NRBC BLD-RTO: 0 /100 WBC
PLATELET # BLD AUTO: 238 K/UL (ref 150–450)
PMV BLD AUTO: 10.5 FL (ref 9.2–12.9)
RBC # BLD AUTO: 3.5 M/UL (ref 4–5.4)
WBC # BLD AUTO: 6.64 K/UL (ref 3.9–12.7)

## 2024-08-31 PROCEDURE — 25000003 PHARM REV CODE 250: Performed by: EMERGENCY MEDICINE

## 2024-08-31 PROCEDURE — 25000003 PHARM REV CODE 250

## 2024-08-31 PROCEDURE — 12002 RPR S/N/AX/GEN/TRNK2.6-7.5CM: CPT

## 2024-08-31 PROCEDURE — 85025 COMPLETE CBC W/AUTO DIFF WBC: CPT

## 2024-08-31 PROCEDURE — 99283 EMERGENCY DEPT VISIT LOW MDM: CPT | Mod: 25

## 2024-08-31 RX ORDER — ACETAMINOPHEN 500 MG
1000 TABLET ORAL
Status: COMPLETED | OUTPATIENT
Start: 2024-08-31 | End: 2024-08-31

## 2024-08-31 RX ORDER — SILVER NITRATE 38.21; 12.74 MG/1; MG/1
5 STICK TOPICAL
Status: DISCONTINUED | OUTPATIENT
Start: 2024-08-31 | End: 2024-08-31

## 2024-08-31 RX ORDER — SILVER NITRATE 38.21; 12.74 MG/1; MG/1
5 STICK TOPICAL
Status: COMPLETED | OUTPATIENT
Start: 2024-08-31 | End: 2024-08-31

## 2024-08-31 RX ADMIN — ACETAMINOPHEN 1000 MG: 500 TABLET ORAL at 12:08

## 2024-08-31 RX ADMIN — SILVER NITRATE APPLICATORS 5 APPLICATOR: 25; 75 STICK TOPICAL at 12:08

## 2024-08-31 NOTE — ED PROVIDER NOTES
Encounter Date: 8/31/2024       History     Chief Complaint   Patient presents with    Fall     Fell on thurs seen in er, now more swelling to r hand     Patient is an 80 y/o female who presents to Ed due to skin abrasion from recent fall with bleeding. PMHx of anticoagulant use, DM2, afib, chf. Patient recently had a fall in which was seen in the ED and performed imaging to rule out intracranial bleeding, skull fracture and any emergent pathology. Patient arrives today due to oozing bleed that has not stopped despite wound care. Patient's daughter contacted PCP and wound care and was told to come to the ED. Patient refers to having swelling and bruising of right hand and forearm.  Fall occurred 2 days ago on August 29th.  She was on an anticoagulant and antiplatelet agent.  She has a abrasions to her right forearm and hand and plain films at those sites did not reveal any fractures.  Abrasions were hemostatic at time of ER encounter.  Her daughter has been performing dressing changes twice a day and has been saturating the gauze.  Patient reports chronic weakness but no acute weakness.        Review of patient's allergies indicates:   Allergen Reactions    Celebrex [celecoxib] Shortness Of Breath    Ciprofloxacin Swelling     lip    Dicyclomine Hives    Adhesive Dermatitis    Avelox [moxifloxacin] Swelling    Cilostazol Other (See Comments)     Elevates blood pressure    Eryc [erythromycin] Other (See Comments)     unknown    Iodine and iodide containing products Hives    Keflex [cephalexin] Hives    Meclomen      rashes    Meloxicam      Ear ringing    Metoclopramide Other (See Comments)     High blood pressure    Sulfa (sulfonamide antibiotics) Itching     Past Medical History:   Diagnosis Date    Acute on chronic diastolic (congestive) heart failure 11/20/2019    Anticoagulant long-term use     on Plavix since May 2015    Anxiety     Arthritis     Asthma     Atrial fibrillation     Atrial fibrillation with RVR  10/19/2020    Cataracts, bilateral     Chest pain, atypical     Chronic atrial fibrillation with rapid ventricular response 06/23/2017    Colon polyp     Coronary artery disease     Diabetes mellitus     Dry eyes     Esophageal erosions     General anesthetics causing adverse effect in therapeutic use     GERD (gastroesophageal reflux disease)     Heart murmur     Hemorrhoid     High cholesterol     Hypertension     Irritable bowel syndrome     Paroxysmal atrial fibrillation 10/30/2020    Persistent atrial fibrillation 02/10/2020    Shingles 2015    Stenosis of aortic and mitral valves     Stroke     TIA (transient ischemic attack)     Use of cane as ambulatory aid      Past Surgical History:   Procedure Laterality Date    ABDOMINAL SURGERY      stents to SMA    angiocele      2007, 2014    AORTIC VALVE REPLACEMENT N/A 11/19/2019    Procedure: Replacement-valve-aortic;  Surgeon: Jack Capone MD;  Location: [facility] CATH LAB;  Service: Cardiology;  Laterality: N/A;    BREAST SURGERY      left--- a lump--- no cancer    CARDIAC VALVE SURGERY      COLONOSCOPY  2014    COLONOSCOPY N/A 3/14/2018    Procedure: COLONOSCOPY;  Surgeon: Efren Kemp MD;  Location: [facility] ENDO ([address] [place] [address]);  Service: Endoscopy;  Laterality: N/A;  ok to hold Plavix 5 days prior to procedure per Dr RESHMA Hernandez     ok per Dr Kepm to hold Eliquis 2 days prior to procedure (see telephone encounter dated-2/7/18)    CORONARY ANGIOGRAPHY N/A 2/15/2024    Procedure: ANGIOGRAM, CORONARY ARTERY;  Surgeon: Jos Baptiste MD;  Location: [facility] CATH LAB;  Service: Cardiology;  Laterality: N/A;    HERNIA REPAIR      HYSTERECTOMY  partial    1982 partial hysterectomy    IMPLANTATION OF BIVENTRICULAR PERMANENT PACEMAKER AS UPGRADE TO EXISTING PACEMAKER Left 5/29/2024    Procedure: Biventricular pacemaker upgrade;  Surgeon: Chencho Moeller MD;  Location: [facility] EP LAB;  Service: Cardiology;  Laterality: Left;  CHF, AF,  CRTP ugrd, BIO, MAC, [place], 3prep ** BIO dcPPM in situ/  Contrast Prep**    LEFT HEART CATHETERIZATION Right 11/5/2019    Procedure: Left heart cath;  Surgeon: Jack Capone MD;  Location: [facility] CATH LAB;  Service: Cardiology;  Laterality: Right;    left nasal polyp      left neck lymph node      nose polyp      PHLEBOGRAPHY Left 5/1/2024    Procedure: Venogram;  Surgeon: Chencho Moeller MD;  Location: [facility] EP LAB;  Service: Cardiology;  Laterality: Left;  HF, Venogram ( Lt arm) , AK, 3 prep ** BIO dcPPM in situ/ Contrast Prep**    right hip fatty mass tissue      stent to small intestine      SUPERIOR VENA CAVA ANGIOPLASTY / STENTING      TONSILLECTOMY      TOTAL ABDOMINAL HYSTERECTOMY      2014    TOTAL KNEE ARTHROPLASTY Bilateral     TREATMENT OF CARDIAC ARRHYTHMIA N/A 2/10/2020    Procedure: CARDIOVERSION;  Surgeon: Jayy Parrish MD;  Location: [facility] EP LAB;  Service: Cardiology;  Laterality: N/A;  AF, PEDRO, DCCV, MAC, DM, 3 Prep    TREATMENT OF CARDIAC ARRHYTHMIA N/A 5/15/2020    Procedure: CARDIOVERSION;  Surgeon: Hany Bee MD;  Location: [facility] EP LAB;  Service: Cardiology;  Laterality: N/A;  AF, PEDRO, DCCV, MAC, DM, 3 Prep    UPPER GASTROINTESTINAL ENDOSCOPY  2014     Family History   Problem Relation Name Age of Onset    Heart attack Mother      Stroke Father      Heart failure Brother      Colon cancer Neg Hx      Inflammatory bowel disease Neg Hx       Social History     Tobacco Use    Smoking status: Never    Smokeless tobacco: Never   Substance Use Topics    Alcohol use: No    Drug use: Never     Review of Systems    Physical Exam     Initial Vitals [08/31/24 1058]   BP Pulse Resp Temp SpO2   (!) 118/56 60 18 98 °F (36.7 °C) 100 %      MAP       --         Physical Exam    Constitutional: She appears well-developed and well-nourished.   HENT:   Head: Normocephalic.   Eyes:   No conjunctival pallor   Cardiovascular:  Normal rate, regular rhythm and normal heart sounds.           Pulmonary/Chest: Breath sounds normal. No stridor.   Abdominal: She  exhibits no distension.   Musculoskeletal:      Comments: Some limited ability to close fist in right hand due to swelling.     Neurological: She is alert.   Intact sensation in fingers   Skin: Capillary refill takes 2 to 3 seconds.   Abrasion on right hand dorsum and right fore arm has two abrasions. All oozing blood slowly.  Friable ecchymotic tissue to dorsum of right hand with large 7cm C-shaped abrasion.  There is oozing blood at base without any pulsatile bleeding.  No exposed tendons.  2 C- shaped abrasions to posterior aspect of R forearm with oozing               ED Course   Lac Repair    Date/Time: 8/31/2024 1:41 PM    Performed by: Tano Aguillon MD  Authorized by: Gabe Bello MD    Consent:     Consent obtained:  Verbal    Consent given by:  Patient    Risks, benefits, and alternatives were discussed: yes      Risks discussed:  Pain, poor cosmetic result, poor wound healing and need for additional repair    Alternatives discussed:  Referral  [facility] protocol:     Procedure explained and questions answered to patient or proxy's satisfaction: yes    Anesthesia:     Anesthesia method:  None  Laceration details:     Location:  Shoulder/arm    Shoulder/arm location:  R lower arm  Exploration:     Hemostasis achieved with:  Cautery and direct pressure (Dermabond)    Wound exploration: entire depth of wound visualized      Wound extent: no foreign bodies/material noted, no nerve damage noted, no tendon damage noted, no underlying fracture noted and no vascular damage noted    Treatment:     Amount of cleaning:  Standard    Debridement:  None  Post-procedure details:     Dressing: Surgicel and bulky dressing.    Procedure completion:  Tolerated well, no immediate complications    Labs Reviewed   CBC W/ AUTO DIFFERENTIAL - Abnormal       Result Value    WBC 6.64      RBC 3.50 (*)     Hemoglobin 10.7 (*)     Hematocrit 34.5 (*)     MCV 99 (*)     MCH 30.6      MCHC 31.0 (*)     RDW 15.0 (*)      Platelets 238      MPV 10.5      Immature Granulocytes 0.5      Gran # (ANC) 4.2      Immature Grans (Abs) 0.03      Lymph # 1.6      Mono # 0.8      Eos # 0.0      Baso # 0.04      nRBC 0      Gran % 62.5      Lymph % 24.2      Mono % 11.6      Eosinophil % 0.6      Basophil % 0.6      Differential Method Automated            Imaging Results    None          Medications   silver nitrate applicators applicator 5 applicator (5 applicators Topical (Top) Given by Provider 8/31/24 1200)   acetaminophen tablet 1,000 mg (1,000 mg Oral Given 8/31/24 1235)     Medical Decision Making  Patient is 80 y/o female with PMHx of afib, chf,anticoagulant use. Replaced previous dressing with new dressing and evaluated wound. Wound cauterized with silver nitrate and electrocautery. Applied dermabond. Redressed wound after cauterization. CBC with Hgb of 10.7. Previous ED visit with ct head shows no intracranial bleed, ct hip shows no fracture, xray hip and xray wrist show no fracture. Patient discharged home.    Amount and/or Complexity of Data Reviewed  External Data Reviewed: notes.  Labs: ordered.     Details: cbc    Risk  OTC drugs.  Prescription drug management.              Attending Attestation:   Physician Attestation Statement for Resident:  As the supervising MD   Physician Attestation Statement: I have personally seen and examined this patient.   I agree with the above history.  -: 79-year-old woman with extensive past medical history including AFib on apixaban, antiplatelet agents presents with persistent oozing from abrasions to right upper extremity status post fall.  She was evaluated in the ER and had negative plain films.  Hemostatic at that time but has had persistent bleeding since.  Her daughter performed a dressing change this morning and noted that the bandages were saturated.  Patient reports some chronic weakness but no worse today than usual.  No new trauma.  Abrasions quite extensive and gaping with persistent  oozing.  Despite being so far from the trauma, performed local wound care with silver nitrate sticks, electrocautery, and Dermabond for hemostasis.  We did not close the skin as she was so far out from the injury and tissue was so devitalized and friable.  Surgicel gauze covered and bulky dressing.  Will allow to heal by secondary intention.  Patient's daughter encouraged follow up with wound care.  No signs of infection.  Return precautions   As the supervising MD I agree with the above PE.     As the supervising MD I agree with the above treatment, course, plan, and disposition.   I was personally present during the entire procedure.                                         Clinical Impression:  Final diagnoses:  [T14.8XXA] Skin abrasion (Primary)          ED Disposition Condition    Discharge Stable          ED Prescriptions    None       Follow-up Information       Follow up With Specialties Details Why Contact Info Additional Information    Krzysztof Mcgraw MD Family Medicine In 3 days  [address] [facility]  [address]  [facility] [address]  [phone]       [facility] Schedule an appointment as soon as possible for a visit   [address] [facility] [address] [phone] Main Building, 5th Floor Please park in [facility] and use Clinic elevator             Tano Aguillon MD  Resident  08/31/24 1170       Gabe Bello MD  09/01/24 4028

## 2024-08-31 NOTE — DISCHARGE INSTRUCTIONS
Dressing changes every 12-24 hours as instructed. Return for any severe worsening of bleeding. Follow up with wound care.

## 2024-08-31 NOTE — ED TRIAGE NOTES
Patient was seen at urgent care of previous fall, patients wound related to previous fall not healing properly and will not stop bleeding. The patient is on blood thinners.

## 2024-09-02 ENCOUNTER — HOSPITAL ENCOUNTER (EMERGENCY)
Facility: HOSPITAL | Age: 79
Discharge: HOME | End: 2024-09-02
Attending: EMERGENCY MEDICINE
Payer: MEDICARE

## 2024-09-02 VITALS
HEIGHT: 64 IN | RESPIRATION RATE: 16 BRPM | WEIGHT: 207 LBS | TEMPERATURE: 98 F | HEART RATE: 61 BPM | SYSTOLIC BLOOD PRESSURE: 140 MMHG | DIASTOLIC BLOOD PRESSURE: 64 MMHG | BODY MASS INDEX: 35.34 KG/M2 | OXYGEN SATURATION: 100 %

## 2024-09-02 VITALS
HEIGHT: 64 IN | TEMPERATURE: 99 F | HEART RATE: 57 BPM | BODY MASS INDEX: 35.34 KG/M2 | DIASTOLIC BLOOD PRESSURE: 72 MMHG | SYSTOLIC BLOOD PRESSURE: 178 MMHG | OXYGEN SATURATION: 100 % | WEIGHT: 207 LBS | RESPIRATION RATE: 20 BRPM

## 2024-09-02 DIAGNOSIS — S49.91XD INJURY OF RIGHT UPPER EXTREMITY, SUBSEQUENT ENCOUNTER: ICD-10-CM

## 2024-09-02 DIAGNOSIS — M79.602 LEFT ARM PAIN: ICD-10-CM

## 2024-09-02 DIAGNOSIS — T14.90XA INJURY: Primary | ICD-10-CM

## 2024-09-02 DIAGNOSIS — S61.411A SKIN TEAR OF RIGHT HAND WITHOUT COMPLICATION, INITIAL ENCOUNTER: Primary | ICD-10-CM

## 2024-09-02 LAB
BASOPHILS # BLD AUTO: 0.05 K/UL (ref 0–0.2)
BASOPHILS # BLD AUTO: 0.06 K/UL (ref 0–0.2)
BASOPHILS NFR BLD: 0.7 % (ref 0–1.9)
BASOPHILS NFR BLD: 0.8 % (ref 0–1.9)
DIFFERENTIAL METHOD BLD: ABNORMAL
DIFFERENTIAL METHOD BLD: ABNORMAL
EOSINOPHIL # BLD AUTO: 0.1 K/UL (ref 0–0.5)
EOSINOPHIL # BLD AUTO: 0.1 K/UL (ref 0–0.5)
EOSINOPHIL NFR BLD: 0.7 % (ref 0–8)
EOSINOPHIL NFR BLD: 0.8 % (ref 0–8)
ERYTHROCYTE [DISTWIDTH] IN BLOOD BY AUTOMATED COUNT: 15.1 % (ref 11.5–14.5)
ERYTHROCYTE [DISTWIDTH] IN BLOOD BY AUTOMATED COUNT: 15.2 % (ref 11.5–14.5)
HCT VFR BLD AUTO: 30.4 % (ref 37–48.5)
HCT VFR BLD AUTO: 32 % (ref 37–48.5)
HGB BLD-MCNC: 10.1 G/DL (ref 12–16)
HGB BLD-MCNC: 9.4 G/DL (ref 12–16)
IMM GRANULOCYTES # BLD AUTO: 0.03 K/UL (ref 0–0.04)
IMM GRANULOCYTES # BLD AUTO: 0.07 K/UL (ref 0–0.04)
IMM GRANULOCYTES NFR BLD AUTO: 0.4 % (ref 0–0.5)
IMM GRANULOCYTES NFR BLD AUTO: 1.1 % (ref 0–0.5)
LYMPHOCYTES # BLD AUTO: 1.7 K/UL (ref 1–4.8)
LYMPHOCYTES # BLD AUTO: 1.8 K/UL (ref 1–4.8)
LYMPHOCYTES NFR BLD: 22.3 % (ref 18–48)
LYMPHOCYTES NFR BLD: 26.1 % (ref 18–48)
MCH RBC QN AUTO: 30.1 PG (ref 27–31)
MCH RBC QN AUTO: 31.1 PG (ref 27–31)
MCHC RBC AUTO-ENTMCNC: 30.9 G/DL (ref 32–36)
MCHC RBC AUTO-ENTMCNC: 31.6 G/DL (ref 32–36)
MCV RBC AUTO: 97 FL (ref 82–98)
MCV RBC AUTO: 99 FL (ref 82–98)
MONOCYTES # BLD AUTO: 0.8 K/UL (ref 0.3–1)
MONOCYTES # BLD AUTO: 1.1 K/UL (ref 0.3–1)
MONOCYTES NFR BLD: 12.9 % (ref 4–15)
MONOCYTES NFR BLD: 13.3 % (ref 4–15)
NEUTROPHILS # BLD AUTO: 3.7 K/UL (ref 1.8–7.7)
NEUTROPHILS # BLD AUTO: 5.1 K/UL (ref 1.8–7.7)
NEUTROPHILS NFR BLD: 57.9 % (ref 38–73)
NEUTROPHILS NFR BLD: 63 % (ref 38–73)
NRBC BLD-RTO: 0 /100 WBC
NRBC BLD-RTO: 0 /100 WBC
PLATELET # BLD AUTO: 209 K/UL (ref 150–450)
PLATELET # BLD AUTO: 217 K/UL (ref 150–450)
PMV BLD AUTO: 10.4 FL (ref 9.2–12.9)
PMV BLD AUTO: 9.9 FL (ref 9.2–12.9)
RBC # BLD AUTO: 3.12 M/UL (ref 4–5.4)
RBC # BLD AUTO: 3.25 M/UL (ref 4–5.4)
WBC # BLD AUTO: 6.31 K/UL (ref 3.9–12.7)
WBC # BLD AUTO: 8.11 K/UL (ref 3.9–12.7)

## 2024-09-02 PROCEDURE — 99283 EMERGENCY DEPT VISIT LOW MDM: CPT | Mod: 27

## 2024-09-02 PROCEDURE — 25000003 PHARM REV CODE 250

## 2024-09-02 PROCEDURE — 85025 COMPLETE CBC W/AUTO DIFF WBC: CPT

## 2024-09-02 PROCEDURE — 99283 EMERGENCY DEPT VISIT LOW MDM: CPT

## 2024-09-02 PROCEDURE — 25000003 PHARM REV CODE 250: Performed by: EMERGENCY MEDICINE

## 2024-09-02 PROCEDURE — 85025 COMPLETE CBC W/AUTO DIFF WBC: CPT | Mod: 91

## 2024-09-02 RX ORDER — HYDROCODONE BITARTRATE AND ACETAMINOPHEN 5; 325 MG/1; MG/1
1 TABLET ORAL EVERY 4 HOURS PRN
Qty: 12 TABLET | Refills: 0 | Status: SHIPPED | OUTPATIENT
Start: 2024-09-02

## 2024-09-02 RX ORDER — HYDROCODONE BITARTRATE AND ACETAMINOPHEN 5; 325 MG/1; MG/1
1 TABLET ORAL
Status: COMPLETED | OUTPATIENT
Start: 2024-09-02 | End: 2024-09-02

## 2024-09-02 RX ADMIN — TRANEXAMIC ACID 500 MG: 100 INJECTION, SOLUTION INTRAVENOUS at 08:09

## 2024-09-02 RX ADMIN — HYDROCODONE BITARTRATE AND ACETAMINOPHEN 1 TABLET: 5; 325 TABLET ORAL at 06:09

## 2024-09-02 NOTE — DISCHARGE INSTRUCTIONS
Continue frequent dressing changes as previously instructed. Be sure to place more pressure overlying the skin tear. Hold your eliquis for the next 2 days. Follow up with wound care. Follow up with your primary care doctor. Return to the ED for new or worsening symptoms

## 2024-09-02 NOTE — ED TRIAGE NOTES
Pt arrives with family from home with wound bleeding. Pt family states that pt fell a week or so ago and the pt has a skin tear as a result. Family states that it started bleeding again this am so they brought her in.

## 2024-09-02 NOTE — PROVIDER PROGRESS NOTES - EMERGENCY DEPT.
Encounter Date: 9/2/2024    ED Physician Progress Notes            [facility] - Emergency Dept      HOME HEALTH ORDERS  FACE TO FACE ENCOUNTER    Patient Name: Adriana Strong  YOB: 1945    PCP: Krzysztof Mcgraw MD   PCP Address: [address] [facility] [address] / [place] [address]  PCP Phone Number: [phone]  PCP Fax: [phone]    Encounter Date: 9/2/24    Admit to Home Health    Diagnoses:  There are no hospital problems to display for this patient.      Follow Up Appointments:  Future Appointments   Date Time Provider Department Center   9/10/2024  9:20 AM PACEMAKER, ICD NOM ARRHPRO [facility]   9/10/2024  9:45 AM EKG, APPT [facility] EKG [facility]   9/10/2024 10:00 AM Lissette Chacon NP [facility] ARRHYTH [facility]   9/24/2024  3:15 PM EKG, APPT [facility] EKG [facility]   9/24/2024  4:00 PM Shikha Raymundo MD [facility] CARDIO [facility]       Allergies:  Review of patient's allergies indicates:   Allergen Reactions    Celebrex [celecoxib] Shortness Of Breath    Ciprofloxacin Swelling     lip    Dicyclomine Hives    Adhesive Dermatitis    Avelox [moxifloxacin] Swelling    Cilostazol Other (See Comments)     Elevates blood pressure    Eryc [erythromycin] Other (See Comments)     unknown    Iodine and iodide containing products Hives    Keflex [cephalexin] Hives    Meclomen      rashes    Meloxicam      Ear ringing    Metoclopramide Other (See Comments)     High blood pressure    Sulfa (sulfonamide antibiotics) Itching       Medications: Review discharge medications with patient and family and provide education.    Current Facility-Administered Medications   Medication Dose Route Frequency Provider Last Rate Last Admin    tranexamic acid nebulizer Soln 500 mg  500 mg Nebulization ED 1 Time Gabe Bello MD         Current Outpatient Medications   Medication Sig Dispense Refill    acetaminophen (TYLENOL) 650 MG TbSR Take 1,300 mg by mouth 2 (two) times daily as needed.      albuterol-ipratropium (DUO-NEB) 2.5 mg-0.5 mg/3 mL  nebulizer solution Take 3 mLs by nebulization every 4 (four) hours as needed for Wheezing or Shortness of Breath.      apixaban (ELIQUIS) 5 mg Tab Take 1 tablet (5 mg total) by mouth 2 (two) times daily. 180 tablet 3    ascorbic acid, vitamin C, (VITAMIN C) 500 MG tablet Take 1,000 mg by mouth once daily.       atorvastatin (LIPITOR) 10 MG tablet Take 1 tablet (10 mg total) by mouth once daily. 30 tablet 0    calcium carbonate/vitamin D3 (CALTRATE 600 + D ORAL) Take 1 tablet by mouth once daily.      carvediloL (COREG) 6.25 MG tablet Take 1 tablet (6.25 mg total) by mouth 2 (two) times daily with meals. 180 tablet 3    cimetidine (TAGAMET) 300 MG tablet Take 300 mg  by mouth  13 hours prior to your procedure (at 1 am) ;  then again 7 hours prior ( at 7 am)  , and again ( at 12 pm ) upon arrival to the hospital. 3 tablet 0    clopidogreL (PLAVIX) 75 mg tablet Take 1 tablet (75 mg total) by mouth once daily. 90 tablet 3    diphenhydrAMINE (BENADRYL) 25 mg capsule Take 50 mg ( 2 tablets ) by mouth 13 hours prior to your procedure (at 1 am) ;  then again 7 hours prior ( at 7 am)  , and again  ( at 12 pm ) upon arrival to the hospital.      DULoxetine (CYMBALTA) 20 MG capsule Take 1 capsule (20 mg total) by mouth once daily. 30 capsule 0    empagliflozin (JARDIANCE) 10 mg tablet Take 1 tablet (10 mg total) by mouth once daily. 30 tablet 11    fexofenadine (ALLEGRA) 60 MG tablet Take 60 mg by mouth once daily.      furosemide (LASIX) 40 MG tablet Take 1 tablet (40 mg total) by mouth 2 (two) times a day. 180 tablet 3    gabapentin (NEURONTIN) 600 MG tablet Take 600 mg by mouth 3 (three) times daily.      isosorbide mononitrate (IMDUR) 60 MG 24 hr tablet Take 1 tablet (60 mg total) by mouth once daily. 30 tablet 0    LORazepam (ATIVAN) 0.5 MG tablet Take 1 tablet (0.5 mg total) by mouth every morning. 30 tablet 2    losartan (COZAAR) 50 MG Tab Take 1 tablet (50 mg total) by mouth once daily. 30 tablet 11    metFORMIN  (GLUCOPHAGE) 500 MG tablet Take 1 tablet (500 mg total) by mouth 2 (two) times daily. 60 tablet 0    montelukast (SINGULAIR) 10 mg tablet Take 10 mg by mouth once daily.      mv-mn/folic ac/calcium/vit K1 (WOMEN'S 50 PLUS MULTIVITAMIN ORAL) Take 1 tablet by mouth once daily.      nitroGLYCERIN (NITROSTAT) 0.4 MG SL tablet Place 1 tablet (0.4 mg total) under the tongue every 5 (five) minutes as needed for Chest pain. 25 tablet PRN    pantoprazole (PROTONIX) 40 MG tablet Take 1 tablet (40 mg total) by mouth once daily. 90 tablet 3    predniSONE (DELTASONE) 50 MG Tab Take 50 mg ( 1 tablet ) by mouth 13 hours prior to your procedure (at 1 am)  ;  then again 7 hours prior ( at 7 am)  , and again  ( at 12 pm ) upon arrival to the hospital. 3 tablet 0    ranolazine (RANEXA) 1,000 mg Tb12 Take 1 tablet (1,000 mg total) by mouth 2 (two) times daily. 180 tablet 3    spironolactone (ALDACTONE) 25 MG tablet Take 1 tablet (25 mg total) by mouth once daily. 30 tablet 11    SYMBICORT 160-4.5 mcg/actuation HFAA Inhale 1 puff into the lungs 2 (two) times daily.       Facility-Administered Medications Ordered in Other Encounters   Medication Dose Route Frequency Provider Last Rate Last Admin    0.9%  NaCl infusion   Intravenous Continuous Lynette Hightower NP   New Bag at 05/29/24 1440    sodium chloride 0.9% flush 5 mL  5 mL Intravenous PRN Lynette Hightower NP        vancomycin (VANCOCIN) 1,000 mg in dextrose 5 % (D5W) 250 mL IVPB (Vial-Mate)  1,000 mg Intravenous On Call Procedure Lynette Hightower NP   1,000 mg at 05/29/24 1454     Current Discharge Medication List        CONTINUE these medications which have NOT CHANGED    Details   acetaminophen (TYLENOL) 650 MG TbSR Take 1,300 mg by mouth 2 (two) times daily as needed.      albuterol-ipratropium (DUO-NEB) 2.5 mg-0.5 mg/3 mL nebulizer solution Take 3 mLs by nebulization every 4 (four) hours as needed for Wheezing or Shortness of Breath.      apixaban (ELIQUIS) 5  mg Tab Take 1 tablet (5 mg total) by mouth 2 (two) times daily.  Qty: 180 tablet, Refills: 3    Associated Diagnoses: Persistent atrial fibrillation      ascorbic acid, vitamin C, (VITAMIN C) 500 MG tablet Take 1,000 mg by mouth once daily.       atorvastatin (LIPITOR) 10 MG tablet Take 1 tablet (10 mg total) by mouth once daily.  Qty: 30 tablet, Refills: 0      calcium carbonate/vitamin D3 (CALTRATE 600 + D ORAL) Take 1 tablet by mouth once daily.      carvediloL (COREG) 6.25 MG tablet Take 1 tablet (6.25 mg total) by mouth 2 (two) times daily with meals.  Qty: 180 tablet, Refills: 3    Comments: .      cimetidine (TAGAMET) 300 MG tablet Take 300 mg  by mouth  13 hours prior to your procedure (at 1 am) ;  then again 7 hours prior ( at 7 am)  , and again ( at 12 pm ) upon arrival to the hospital.  Qty: 3 tablet, Refills: 0      clopidogreL (PLAVIX) 75 mg tablet Take 1 tablet (75 mg total) by mouth once daily.  Qty: 90 tablet, Refills: 3      diphenhydrAMINE (BENADRYL) 25 mg capsule Take 50 mg ( 2 tablets ) by mouth 13 hours prior to your procedure (at 1 am) ;  then again 7 hours prior ( at 7 am)  , and again  ( at 12 pm ) upon arrival to the hospital.      DULoxetine (CYMBALTA) 20 MG capsule Take 1 capsule (20 mg total) by mouth once daily.  Qty: 30 capsule, Refills: 0      empagliflozin (JARDIANCE) 10 mg tablet Take 1 tablet (10 mg total) by mouth once daily.  Qty: 30 tablet, Refills: 11    Associated Diagnoses: Chronic diastolic heart failure      fexofenadine (ALLEGRA) 60 MG tablet Take 60 mg by mouth once daily.      furosemide (LASIX) 40 MG tablet Take 1 tablet (40 mg total) by mouth 2 (two) times a day.  Qty: 180 tablet, Refills: 3    Associated Diagnoses: Essential hypertension      gabapentin (NEURONTIN) 600 MG tablet Take 600 mg by mouth 3 (three) times daily.      isosorbide mononitrate (IMDUR) 60 MG 24 hr tablet Take 1 tablet (60 mg total) by mouth once daily.  Qty: 30 tablet, Refills: 0      LORazepam  (ATIVAN) 0.5 MG tablet Take 1 tablet (0.5 mg total) by mouth every morning.  Qty: 30 tablet, Refills: 2      losartan (COZAAR) 50 MG Tab Take 1 tablet (50 mg total) by mouth once daily.  Qty: 30 tablet, Refills: 11    Comments: .      metFORMIN (GLUCOPHAGE) 500 MG tablet Take 1 tablet (500 mg total) by mouth 2 (two) times daily.  Qty: 60 tablet, Refills: 0      montelukast (SINGULAIR) 10 mg tablet Take 10 mg by mouth once daily.      mv-mn/folic ac/calcium/vit K1 (WOMEN'S 50 PLUS MULTIVITAMIN ORAL) Take 1 tablet by mouth once daily.      nitroGLYCERIN (NITROSTAT) 0.4 MG SL tablet Place 1 tablet (0.4 mg total) under the tongue every 5 (five) minutes as needed for Chest pain.  Qty: 25 tablet, Refills: PRN    Comments: Med to bed 470      pantoprazole (PROTONIX) 40 MG tablet Take 1 tablet (40 mg total) by mouth once daily.  Qty: 90 tablet, Refills: 3      predniSONE (DELTASONE) 50 MG Tab Take 50 mg ( 1 tablet ) by mouth 13 hours prior to your procedure (at 1 am)  ;  then again 7 hours prior ( at 7 am)  , and again  ( at 12 pm ) upon arrival to the hospital.  Qty: 3 tablet, Refills: 0      ranolazine (RANEXA) 1,000 mg Tb12 Take 1 tablet (1,000 mg total) by mouth 2 (two) times daily.  Qty: 180 tablet, Refills: 3      spironolactone (ALDACTONE) 25 MG tablet Take 1 tablet (25 mg total) by mouth once daily.  Qty: 30 tablet, Refills: 11    Comments: .      SYMBICORT 160-4.5 mcg/actuation HFAA Inhale 1 puff into the lungs 2 (two) times daily.               I have seen and examined this patient within the last 30 days. My clinical findings that support the need for the home health skilled services and home bound status are the following:no   Medical restrictions requiring assistance of another human to leave home due to  Wound care needs.     Diet:   regular diet    Labs:  N/A    Referrals/ Consults  Wound care    Activities:   activity as tolerated    Nursing:     SN to complete comprehensive assessment including routine  vital signs. Instruct on disease process and s/s of complications to report to MD. Review/verify medication list sent home with the patient at time of discharge  and instruct patient/caregiver as needed. Frequency may be adjusted depending on start of care date.     Skilled nurse to perform up to 3 visits PRN for symptoms related to diagnosis    When multiple disciplines ordered:    Start of Care occurs on Sunday - Wednesday schedule remaining discipline evaluations as ordered on separate consecutive days following the start of care.    Thursday SOC -schedule subsequent evaluations Friday and Monday the following week.     Friday - Saturday SOC - schedule subsequent discipline evaluations on consecutive days starting Monday of the following week.    Notify MD if SBP > 160 or < 90; DBP > 90 or < 50; HR > 120 or < 50; Temp > 101; O2 < 88%;    Ok to schedule additional visits based on staff availability and patient request on consecutive days within the home health episode.    For all post-discharge communication and subsequent orders please contact patient's primary care physician.   Miscellaneous   Heart Failure:      SN to instruct on the following:    Instruct on the definition of CHF.   Instruct on the signs/sympoms of CHF to be reported.   Instruct on and monitor daily weights.   Instruct on factors that cause exacerbation.   Instruct on action, dose, schedule, and side effects of medications.   Instruct on diet as prescribed.   Instruct on activity allowed.   Instruct on life-style modifications for life long management of CHF   SN to assess compliance with daily weights, diet, medications, fluid retention,    safety precautions, activities permitted and life-style modifications.   Additional 1-2 SN visits per week as needed for signs and symptoms     of CHF exacerbation.        Home Health Aide:  Home Health Aide Three times daily    Wound Care Orders  yes:  dressing changes to right hand skin tear    I certify  that this patient is confined to her home and needs intermittent skilled nursing care.

## 2024-09-02 NOTE — ED PROVIDER NOTES
Encounter Date: 9/2/2024       History     Chief Complaint   Patient presents with    Wound Check     Pt has skin tears and swelling on her right arm from a fall on 8/29. Pt states the wounds were cauterized in the ER and pt states the wounds are now bleeding again. +blood thinners, pt arm is wrapped in triage. Pt on home oxygen     Pt is an 79 year old female with PMHx significant for CHF, asthma, DM, CAD, a-fib(on eliquis) who presents for evaluation of bleeding from right hand wound, which has persisted over the last several days. Pt with recent ED visit where pressure dressing and cautery were was applied to her wound. Hemostasis achieved at that time and pt discharged to home with wound care instructions. She reports wound has continued to bleed since going home. No new injury. No fever, chills, chest pain, shortness of breath, nausea, vomiting, or abdominal pain     The history is provided by the patient and a relative.     Review of patient's allergies indicates:   Allergen Reactions    Celebrex [celecoxib] Shortness Of Breath    Ciprofloxacin Swelling     lip    Dicyclomine Hives    Adhesive Dermatitis    Avelox [moxifloxacin] Swelling    Cilostazol Other (See Comments)     Elevates blood pressure    Eryc [erythromycin] Other (See Comments)     unknown    Iodine and iodide containing products Hives    Keflex [cephalexin] Hives    Meclomen      rashes    Meloxicam      Ear ringing    Metoclopramide Other (See Comments)     High blood pressure    Sulfa (sulfonamide antibiotics) Itching     Past Medical History:   Diagnosis Date    Acute on chronic diastolic (congestive) heart failure 11/20/2019    Anticoagulant long-term use     on Plavix since May 2015    Anxiety     Arthritis     Asthma     Atrial fibrillation     Atrial fibrillation with RVR 10/19/2020    Cataracts, bilateral     Chest pain, atypical     Chronic atrial fibrillation with rapid ventricular response 06/23/2017    Colon polyp     Coronary  artery disease     Diabetes mellitus     Dry eyes     Esophageal erosions     General anesthetics causing adverse effect in therapeutic use     GERD (gastroesophageal reflux disease)     Heart murmur     Hemorrhoid     High cholesterol     Hypertension     Irritable bowel syndrome     Paroxysmal atrial fibrillation 10/30/2020    Persistent atrial fibrillation 02/10/2020    Shingles 2015    Stenosis of aortic and mitral valves     Stroke     TIA (transient ischemic attack)     Use of cane as ambulatory aid      Past Surgical History:   Procedure Laterality Date    ABDOMINAL SURGERY      stents to SMA    angiocele      2007, 2014    AORTIC VALVE REPLACEMENT N/A 11/19/2019    Procedure: Replacement-valve-aortic;  Surgeon: Jack Capone MD;  Location: [facility] CATH LAB;  Service: Cardiology;  Laterality: N/A;    BREAST SURGERY      left--- a lump--- no cancer    CARDIAC VALVE SURGERY      COLONOSCOPY  2014    COLONOSCOPY N/A 3/14/2018    Procedure: COLONOSCOPY;  Surgeon: Efren Kemp MD;  Location: [facility] ENDO ([address] [place] [address]);  Service: Endoscopy;  Laterality: N/A;  ok to hold Plavix 5 days prior to procedure per Dr RESHMA Hernandez     ok per Dr Kemp to hold Eliquis 2 days prior to procedure (see telephone encounter dated-2/7/18)    CORONARY ANGIOGRAPHY N/A 2/15/2024    Procedure: ANGIOGRAM, CORONARY ARTERY;  Surgeon: Jos Baptiste MD;  Location: [facility] CATH LAB;  Service: Cardiology;  Laterality: N/A;    HERNIA REPAIR      HYSTERECTOMY  partial    1982 partial hysterectomy    IMPLANTATION OF BIVENTRICULAR PERMANENT PACEMAKER AS UPGRADE TO EXISTING PACEMAKER Left 5/29/2024    Procedure: Biventricular pacemaker upgrade;  Surgeon: Chencho Moeller MD;  Location: [facility] EP LAB;  Service: Cardiology;  Laterality: Left;  CHF, AF,  CRTP ugrd, BIO, MAC, WA, 3prep ** BIO dcPPM in situ/ Contrast Prep**    LEFT HEART CATHETERIZATION Right 11/5/2019    Procedure: Left heart cath;  Surgeon: Jack Capone MD;  Location: [facility] CATH LAB;  Service:  Cardiology;  Laterality: Right;    left nasal polyp      left neck lymph node      nose polyp      PHLEBOGRAPHY Left 5/1/2024    Procedure: Venogram;  Surgeon: Chencho Moeller MD;  Location: [facility] EP LAB;  Service: Cardiology;  Laterality: Left;  HF, Venogram ( Lt arm) , WI, 3 prep ** BIO dcPPM in situ/ Contrast Prep**    right hip fatty mass tissue      stent to small intestine      SUPERIOR VENA CAVA ANGIOPLASTY / STENTING      TONSILLECTOMY      TOTAL ABDOMINAL HYSTERECTOMY      2014    TOTAL KNEE ARTHROPLASTY Bilateral     TREATMENT OF CARDIAC ARRHYTHMIA N/A 2/10/2020    Procedure: CARDIOVERSION;  Surgeon: Jayy Parrish MD;  Location: [facility] EP LAB;  Service: Cardiology;  Laterality: N/A;  AF, PEDRO, DCCV, MAC, DM, 3 Prep    TREATMENT OF CARDIAC ARRHYTHMIA N/A 5/15/2020    Procedure: CARDIOVERSION;  Surgeon: Hany Bee MD;  Location: [facility] EP LAB;  Service: Cardiology;  Laterality: N/A;  AF, PEDRO, DCCV, MAC, DM, 3 Prep    UPPER GASTROINTESTINAL ENDOSCOPY  2014     Family History   Problem Relation Name Age of Onset    Heart attack Mother      Stroke Father      Heart failure Brother      Colon cancer Neg Hx      Inflammatory bowel disease Neg Hx       Social History     Tobacco Use    Smoking status: Never    Smokeless tobacco: Never   Substance Use Topics    Alcohol use: No    Drug use: Never     Review of Systems    Physical Exam     Initial Vitals [09/02/24 0600]   BP Pulse Resp Temp SpO2   (!) 165/69 60 18 97.7 °F (36.5 °C) 99 %      MAP       --         Physical Exam    Nursing note and vitals reviewed.  Constitutional: She appears well-developed and well-nourished. No distress.   HENT:   Head: Normocephalic and atraumatic.   Eyes: EOM are normal. Pupils are equal, round, and reactive to light.   Neck: Neck supple.   Normal range of motion.  Cardiovascular:  Normal rate, regular rhythm and intact distal pulses.           Pulmonary/Chest: Breath sounds normal. No respiratory distress. She has no wheezes.  She has no rales.   Abdominal: Abdomen is soft.   Musculoskeletal:         General: Normal range of motion.      Cervical back: Normal range of motion and neck supple.     Neurological: She is alert and oriented to person, place, and time.   Skin: Skin is warm and dry.   Bleeding skin tear to dorsal surface of right hand                ED Course   Procedures  Labs Reviewed   CBC W/ AUTO DIFFERENTIAL - Abnormal       Result Value    WBC 6.31      RBC 3.12 (*)     Hemoglobin 9.4 (*)     Hematocrit 30.4 (*)     MCV 97      MCH 30.1      MCHC 30.9 (*)     RDW 15.2 (*)     Platelets 209      MPV 9.9      Immature Granulocytes 1.1 (*)     Gran # (ANC) 3.7      Immature Grans (Abs) 0.07 (*)     Lymph # 1.7      Mono # 0.8      Eos # 0.1      Baso # 0.05      nRBC 0      Gran % 57.9      Lymph % 26.1      Mono % 13.3      Eosinophil % 0.8      Basophil % 0.8      Differential Method Automated            Imaging Results    None          Medications   HYDROcodone-acetaminophen 5-325 mg per tablet 1 tablet (1 tablet Oral Given 9/2/24 0624)   tranexamic acid nebulizer Soln 500 mg (500 mg Nebulization Given by Other 9/2/24 0839)     Medical Decision Making  Pt presents for bleeding skin wound. Cautery applied to wound to achieve hemostasis in addition to dermabond and TXA soaked gauze. Pressure dressing applied. Pt stable to discharge to home. Instructed to hold eliquis over the next 2 days. Will write for home health care. Wound care referral has already been sent. Return precautions advised    Amount and/or Complexity of Data Reviewed  Labs: ordered.    Risk  Prescription drug management.              Attending Attestation:   Physician Attestation Statement for Resident:  As the supervising MD   Physician Attestation Statement: I have personally seen and examined this patient.   I agree with the above history.  -: Patient known to myself.  She had a fall August 29th with multiple avulsion to the right forearm and hand.   Hemostatic at that time persistent oozing prompted an ER encounter 2 days ago on August 31st.  I applied electrocautery and Dermabond and placed Surgicel and bulky dressing.  She has been compliant with apixaban and clopidogrel.  Unfortunately, bleeding has persisted.  Asked to do dressing changes every 6 hours and gauze or saturated.  No weakness or lightheadedness.  No new injuries.  2 abrasions on forearm are hemostatic.  Large C-shaped abrasion to dorsum of right hand with persistent oozing.  There is a large clot but some surrounding oozing.  We used additional pressure, Surgicel, electrocautery, Dermabond, TXA soaked gauze, pressure dressing.  Instructions to hold apixaban.  Hemoglobin trending down, now 9.4.  This is a 1.3 g drop from 2 days.  CBC without leukocytosis or thrombocytopenia.   As the supervising MD I agree with the above PE.     As the supervising MD I agree with the above treatment, course, plan, and disposition.   I was personally present during the critical portions of the procedure(s) performed by the resident and was immediately available in the ED to provide services and assistance as needed during the entire procedure.                                         Clinical Impression:  Final diagnoses:  [S61.411A] Skin tear of right hand without complication, initial encounter (Primary)          ED Disposition Condition    Discharge Stable          ED Prescriptions       Medication Sig Dispense Start Date End Date Auth. Provider    HYDROcodone-acetaminophen (NORCO) 5-325 mg per tablet Take 1 tablet by mouth every 4 (four) hours as needed for Pain. 12 tablet 9/2/2024 -- Gabe Bello MD          Follow-up Information       Follow up With Specialties Details Why Contact Info    Krzysztof Mcgraw MD Family Medicine In 1 week  [address]  [address]  [facility] [address]  [phone]               John Singh Jr., MD  Resident  09/02/24 1846       Gabe Bello MD  09/04/24 3307

## 2024-09-03 NOTE — ED PROVIDER NOTES
"Encounter Date: 9/2/2024             History     Chief Complaint   Patient presents with    Arm Injury     Presents with right arm pain after an injury on the 29th, states that surgery cartorized the right hand because they were not able to suture it, family states "the wound just keeps bleeding non stop, was already seen this morning and told everything was okay, now they are back because they want her admitted for observation. On 2L NC at baseline for COPD.     79 year old female PMHx CHF, asthsma, dm, cad, on eliquis, represents for bleeding of right arm. Patient had bandage on arm on arrival, patient had appeared the bleed through the bandage to some extent.  Minimal blood noted on top of the bandage.  On questioning the patient, they have been changing the bandage hourly every time they could see blood.  On prior admission to the emergency department, they did use TXA soaked gauze to control the bleeding.            Review of patient's allergies indicates:   Allergen Reactions    Celebrex [celecoxib] Shortness Of Breath    Ciprofloxacin Swelling     lip    Dicyclomine Hives    Adhesive Dermatitis    Avelox [moxifloxacin] Swelling    Cilostazol Other (See Comments)     Elevates blood pressure    Eryc [erythromycin] Other (See Comments)     unknown    Iodine and iodide containing products Hives    Keflex [cephalexin] Hives    Meclomen      rashes    Meloxicam      Ear ringing    Metoclopramide Other (See Comments)     High blood pressure    Sulfa (sulfonamide antibiotics) Itching     Past Medical History:   Diagnosis Date    Acute on chronic diastolic (congestive) heart failure 11/20/2019    Anticoagulant long-term use     on Plavix since May 2015    Anxiety     Arthritis     Asthma     Atrial fibrillation     Atrial fibrillation with RVR 10/19/2020    Cataracts, bilateral     Chest pain, atypical     Chronic atrial fibrillation with rapid ventricular response 06/23/2017    Colon polyp     Coronary artery disease "     Diabetes mellitus     Dry eyes     Esophageal erosions     General anesthetics causing adverse effect in therapeutic use     GERD (gastroesophageal reflux disease)     Heart murmur     Hemorrhoid     High cholesterol     Hypertension     Irritable bowel syndrome     Paroxysmal atrial fibrillation 10/30/2020    Persistent atrial fibrillation 02/10/2020    Shingles 2015    Stenosis of aortic and mitral valves     Stroke     TIA (transient ischemic attack)     Use of cane as ambulatory aid      Past Surgical History:   Procedure Laterality Date    ABDOMINAL SURGERY      stents to SMA    angiocele      2007, 2014    AORTIC VALVE REPLACEMENT N/A 11/19/2019    Procedure: Replacement-valve-aortic;  Surgeon: Jack Capone MD;  Location: [facility] CATH LAB;  Service: Cardiology;  Laterality: N/A;    BREAST SURGERY      left--- a lump--- no cancer    CARDIAC VALVE SURGERY      COLONOSCOPY  2014    COLONOSCOPY N/A 3/14/2018    Procedure: COLONOSCOPY;  Surgeon: Efren Kemp MD;  Location: [facility] ENDO ([address] [place] [address]);  Service: Endoscopy;  Laterality: N/A;  ok to hold Plavix 5 days prior to procedure per Dr RESHMA Hernandez     ok per Dr Kemp to hold Eliquis 2 days prior to procedure (see telephone encounter dated-2/7/18)    CORONARY ANGIOGRAPHY N/A 2/15/2024    Procedure: ANGIOGRAM, CORONARY ARTERY;  Surgeon: Jos Baptiste MD;  Location: [facility] CATH LAB;  Service: Cardiology;  Laterality: N/A;    HERNIA REPAIR      HYSTERECTOMY  partial    1982 partial hysterectomy    IMPLANTATION OF BIVENTRICULAR PERMANENT PACEMAKER AS UPGRADE TO EXISTING PACEMAKER Left 5/29/2024    Procedure: Biventricular pacemaker upgrade;  Surgeon: Chencho Moeller MD;  Location: [facility] EP LAB;  Service: Cardiology;  Laterality: Left;  CHF, AF,  CRTP ugrd, BIO, MAC, MA, 3prep ** BIO dcPPM in situ/ Contrast Prep**    LEFT HEART CATHETERIZATION Right 11/5/2019    Procedure: Left heart cath;  Surgeon: Jack Capone MD;  Location: [facility] CATH LAB;  Service: Cardiology;   Laterality: Right;    left nasal polyp      left neck lymph node      nose polyp      PHLEBOGRAPHY Left 5/1/2024    Procedure: Venogram;  Surgeon: Chencho Moeller MD;  Location: [facility] EP LAB;  Service: Cardiology;  Laterality: Left;  HF, Venogram ( Lt arm) , AL, 3 prep ** BIO dcPPM in situ/ Contrast Prep**    right hip fatty mass tissue      stent to small intestine      SUPERIOR VENA CAVA ANGIOPLASTY / STENTING      TONSILLECTOMY      TOTAL ABDOMINAL HYSTERECTOMY      2014    TOTAL KNEE ARTHROPLASTY Bilateral     TREATMENT OF CARDIAC ARRHYTHMIA N/A 2/10/2020    Procedure: CARDIOVERSION;  Surgeon: Jayy Parrish MD;  Location: [facility] EP LAB;  Service: Cardiology;  Laterality: N/A;  AF, PEDRO, DCCV, MAC, DM, 3 Prep    TREATMENT OF CARDIAC ARRHYTHMIA N/A 5/15/2020    Procedure: CARDIOVERSION;  Surgeon: Hany Bee MD;  Location: [facility] EP LAB;  Service: Cardiology;  Laterality: N/A;  AF, PEDRO, DCCV, MAC, DM, 3 Prep    UPPER GASTROINTESTINAL ENDOSCOPY  2014     Family History   Problem Relation Name Age of Onset    Heart attack Mother      Stroke Father      Heart failure Brother      Colon cancer Neg Hx      Inflammatory bowel disease Neg Hx       Social History     Tobacco Use    Smoking status: Never    Smokeless tobacco: Never   Substance Use Topics    Alcohol use: No    Drug use: Never     Review of Systems    Physical Exam     Initial Vitals [09/02/24 2052]   BP Pulse Resp Temp SpO2   (!) 164/70 60 18 98.5 °F (36.9 °C) 100 %      MAP       --         Physical Exam    Nursing note and vitals reviewed.  Constitutional: She appears well-developed and well-nourished.   HENT:   Head: Normocephalic and atraumatic.   Neck: Neck supple. No thyromegaly present. No tracheal deviation present. No JVD present.   Cardiovascular:  Normal rate, regular rhythm, normal heart sounds and intact distal pulses.     Exam reveals no gallop and no friction rub.       No murmur heard.  Pulmonary/Chest: Breath sounds normal. No stridor. No  respiratory distress. She has no wheezes. She has no rhonchi. She has no rales. She exhibits no tenderness.   Abdominal: Abdomen is soft. She exhibits no distension and no mass. There is no abdominal tenderness. There is no rebound and no guarding.   Musculoskeletal:         General: No tenderness or edema. Normal range of motion.      Cervical back: Neck supple.     Lymphadenopathy:     She has no cervical adenopathy.   Neurological: She is alert and oriented to person, place, and time. GCS score is 15. GCS eye subscore is 4. GCS verbal subscore is 5. GCS motor subscore is 6.   Skin: Skin is warm and dry. Capillary refill takes less than 2 seconds.   Psychiatric: She has a normal mood and affect. Her behavior is normal. Judgment and thought content normal.         ED Course   Procedures  Labs Reviewed   CBC W/ AUTO DIFFERENTIAL - Abnormal       Result Value    WBC 8.11      RBC 3.25 (*)     Hemoglobin 10.1 (*)     Hematocrit 32.0 (*)     MCV 99 (*)     MCH 31.1 (*)     MCHC 31.6 (*)     RDW 15.1 (*)     Platelets 217      MPV 10.4      Immature Granulocytes 0.4      Gran # (ANC) 5.1      Immature Grans (Abs) 0.03      Lymph # 1.8      Mono # 1.1 (*)     Eos # 0.1      Baso # 0.06      nRBC 0      Gran % 63.0      Lymph % 22.3      Mono % 12.9      Eosinophil % 0.7      Basophil % 0.7      Differential Method Automated            Imaging Results    None          Medications - No data to display  Medical Decision Making  Differential diagnosis in this patient includes blood loss anemia.  This is less likely given the patient has a hemoglobin that is not changed from her baseline.    Life-threatening diagnosis is considered in this patient include hemorrhage, this is unlikely given a normal blood cell count, normotension non-tachycardia.    Amount and/or Complexity of Data Reviewed  External Data Reviewed: notes.     Details: [facility] note from 3/14/24 reviewed GDMT reviewed patient had a PET stress test.     Cardiology  note from 02/09/2024 reviewed and is notable for TAVR.  Labs: ordered.     Details: Labs notable for a CBC with a hemoglobin that is consistent with prior CBC done earlier.  Indicating that the patient has not lost a great deal of blood.  Additionally patient was not tachycardic and normotensive.  Radiology:      Details: We considered imaging however on arm exam, no subcutaneous emphysema is noted, patient had cap refill in all 5 fingers, and was neurovascularly intact so imaging did not appear to be warranted.    Risk  Prescription drug management.  Risk Details: Patient should continue to hold her Eliquis until she follows up with her primary care doctor.                                      Clinical Impression:  Final diagnoses:  [T14.90XA] Injury (Primary)  [M79.602] Left arm pain  [S49.91XD] Injury of right upper extremity, subsequent encounter          ED Disposition Condition    Discharge Stable          ED Prescriptions    None       Follow-up Information       Follow up With Specialties Details Why Contact Info Additional Information    Krzysztof Mcgraw MD Family Medicine Schedule an appointment as soon as possible for a visit in 3 days  [address] [facility]  [address]  [facility] [address]  [phone]       [facility] Call in 3 days  [address] [facility] [address] [phone] Main Building, 5th Floor Please park in [facility] and use Clinic elevator             [place], MD Gabriele  Resident  09/03/24 0028

## 2024-09-03 NOTE — ED NOTES
Nurses Note -- 4 Eyes      9/2/2024   11:17 PM      Skin assessed during: Daily Assessment      [] No Altered Skin Integrity Present    []Prevention Measures Documented      [x] Yes- Altered Skin Integrity Present or Discovered   [] LDA Added if Not in Epic (Describe Wound)   [] New Altered Skin Integrity was Present on Admit and Documented in LDA   [x] Wound Image Taken    Wound Care Consulted? No    Attending Nurse:  Joana Gutiérrez RN/Staff Member:   MD Chetan

## 2024-09-03 NOTE — ED NOTES
Patient identifiers verified and correct for Adriana Strong.  LOC: The patient is awake, alert and aware of environment with an appropriate affect, the patient is oriented x 3 and speaking appropriately.   APPEARANCE: Patient appears calm.  SKIN: Significant bruising and swelling to right arm. Skin tears present to right, upper forearm and right hand. Top of hand blue/purple bruising. Fingertips pale.  MUSCULOSKELETAL: Patient moving all extremities spontaneously except in right arm. Having difficulty moving 3rd and 4th digits of right hand.  RESPIRATORY: Airway is open and patent, respirations are spontaneous, patient has a normal effort and rate, no accessory muscle use noted, pt placed on continuous pulse ox with O2 sats noted at 100% on 2L NC. Hx COPD.  CARDIAC: Patient has a normal rate and regular rhythm, no edema noted, capillary refill < 3 seconds.   GASTRO: Soft and non tender to palpation, no distention noted, normoactive bowel sounds present in all four quadrants. Pt states bowel movements have been regular.  : Pt denies any pain or frequency with urination.  NEURO: Pt opens eyes spontaneously, behavior appropriate to situation, follows commands, facial expression symmetrical, bilateral hand grasp equal and even, purposeful motor response noted, normal sensation in all extremities when touched with a finger.    ACE Bandage removed to watch for swelling and rate of bleeding. No active bleeding present at this time. Slight ooze.

## 2024-09-05 NOTE — PROGRESS NOTES
Ms. Strong is a patient of Dr. Moeller and was last seen in clinic 3/21/2024  .      Subjective:   Patient ID:  Adriana Strong is an 79 y.o. female who presents for follow up of CRT-P and Atrial Fibrillation  .     HPI:    Ms. Strong is an 79 y.o. female with severe AS s/p TAVR (4/2019), CVA/TIA, AF, DM, HFpEF, HTN here for follow up after CRT-P (LBBA pacing lead) implantation.     Background:    Mrs. Strong has a hx of permanent AF with slow ventricular response s/p dcPPM implanted in 2022, hypertension, severe obesity, hypertension, type 2 diabetes mellitus, severe AS s/p TAVR, nonobstructive CAD, and COPD. She presented to the ER in February with heart failure symptoms. LVEF was newly depressed to 35-40% range. Coronary angiogram noted non-obstructive CAD. She was on moderate dosed carvedilol and was RV paced when temporarily set to lower rate of 30 bpm. Her settings on admission was DDD-CLS 60 with AMS base rate of 70. Plan was to stop carvedilol, reprogram her PPM to VVI 60 and reassess in clinic, for which she presents, prior to making a decision on upgrading to CRT-P. In-clinic device check notes 100% RV pacing even when set to VVI 30.     In-clinic ECG is AF with RV pacing (paced QRS 160ms).    In summary, Mrs. Strong is a pleasant 79 year-old woman with permanent AF with slow ventricular response s/p dcPPM implanted in 2022, hypertension, severe obesity, hypertension, type 2 diabetes mellitus, severe AS s/p TAVR, nonobstructive CAD, and COPD. She has a new systolic heart failure likely related to RV pacing. LVEF is 35-40%. RV pacing did not lessen with programming changes and with stopping carvedilol. Recommend upgrading to CRT-P. We discussed the alternatives, benefits and risks of the procedure including pain, infection, bleeding, injury to lung causing pneumothorax requiring tube placement, injury to heart valves, puncture of the heart leading to pericardial effusion or tamponade requiring tube drainage,  heart attack, stroke and death. Discussed addition of lead to the CS as first option. If there are no CS targets would implant a left bundle branch pacing area lead. Discussed need to have venous access available to do this. She has superficial veins along both sides of her chest/arms. Would do a pre-op venogram at a separate earlier date.     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    Insertion and removal of a quadripolar CS pacing lead with unsuccessful LV lead implantation due to poor tissue capture threshold and phrenic nerve capture    Complex modifier requested for lead implantation due to extra-time and equipment required for implanting a lead in the left bundle branch pacing area.    8/29/2024: Trip and fall - significant skin tearing. Held eliquis 2 days due to heavy bleeding. Now back on eliquis and following with wound care.    Today she is here with family. Said she initially felt much better after her procedure. Was off daytime oxygen, but over the past month has started to feel more weak in the legs, WHITLEY, and tremor. Off balance.   Chronic chest tightness at rest. She says she is eating less and has lost weight.    She is currently taking eliquis 5mg BID for stroke prophylaxis and denies significant bleeding episodes. She is currently being treated with carvedilol 6.25mg BID for HR control.  Kidney function is stable, with a creatinine of 0.82 on 5/22/32024.    Device Interrogation  (9/10/2024) reveals an intrinsic AF with CHB with stable lead and device function. RV impedance: Biopolar 585, Unipolar 429 RV threshold: Bipolar 0.7 @ 0.4, Unipolar 0.6 @ 0.4. 100% AF. No ventricular arrhythmias.  She paces 100% in the BiV. Estimated battery longevity 10 years, 4 mo.     I have personally reviewed the patient's EKG today, which shows AFVP at 60bpm. QRS is 98. QT is 450.    Relevant Cardiac Test Results:    2D Echo (9/22/2023):    Left Ventricle: The left ventricle is normal in size. Increased wall thickness. Septal motion is consistent with bundle branch block. There is normal systolic function with a visually estimated ejection fraction of 55 - 60%. Grade III diastolic dysfunction.    Right Ventricle: Normal right ventricular cavity size. Wall thickness is normal. Right ventricle wall motion  is normal. Systolic function is normal. Pacemaker lead present in the ventricle.    Left Atrium: Left atrium is severely dilated.    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 It is reported to be a 26 mm Suzi S3 and Phan valve. Aortic valve area by VTI is 1.40 cm². Aortic valve peak velocity is 2.47 m/s. Mean gradient is 13 mmHg. The dimensionless index is 0.38. There is trace aortic regurgitation.    Pulmonary Artery: The estimated pulmonary artery systolic pressure is 44 mmHg.    IVC/SVC: Intermediate venous pressure at 8 mmHg.       Current Outpatient Medications   Medication Sig    acetaminophen (TYLENOL) 650 MG TbSR Take 1,300 mg by mouth 2 (two) times daily as needed.    albuterol-ipratropium (DUO-NEB) 2.5 mg-0.5 mg/3 mL nebulizer solution Take 3 mLs by nebulization every 4 (four) hours as needed for Wheezing or Shortness of Breath.    apixaban (ELIQUIS) 5 mg Tab Take 1 tablet (5 mg total) by mouth 2 (two) times daily.    ascorbic acid, vitamin C, (VITAMIN C) 500 MG tablet Take 1,000 mg by mouth once daily.     atorvastatin (LIPITOR) 10 MG tablet Take 1 tablet  (10 mg total) by mouth once daily.    calcium carbonate/vitamin D3 (CALTRATE 600 + D ORAL) Take 1 tablet by mouth once daily.    carvediloL (COREG) 6.25 MG tablet Take 1 tablet (6.25 mg total) by mouth 2 (two) times daily with meals.    cimetidine (TAGAMET) 300 MG tablet Take 300 mg  by mouth  13 hours prior to your procedure (at 1 am) ;  then again 7 hours prior ( at 7 am)  , and again ( at 12 pm ) upon arrival to the hospital.    clopidogreL (PLAVIX) 75 mg tablet Take 1 tablet (75 mg total) by mouth once daily.    diphenhydrAMINE (BENADRYL) 25 mg capsule Take 50 mg ( 2 tablets ) by mouth 13 hours prior to your procedure (at 1 am) ;  then again 7 hours prior ( at 7 am)  , and again  ( at 12 pm ) upon arrival to the hospital.    DULoxetine (CYMBALTA) 20 MG capsule Take 1 capsule (20 mg total) by mouth once daily.    estradioL (ESTRACE) 0.01 % (0.1 mg/gram) vaginal cream Place vaginally.    fexofenadine (ALLEGRA) 60 MG tablet Take 60 mg by mouth once daily.    fluconazole (DIFLUCAN) 150 MG Tab Take 150 mg by mouth.    furosemide (LASIX) 40 MG tablet Take 1 tablet (40 mg total) by mouth 2 (two) times a day.    gabapentin (NEURONTIN) 600 MG tablet Take 600 mg by mouth 3 (three) times daily.    HYDROcodone-acetaminophen (NORCO) 5-325 mg per tablet Take 1 tablet by mouth every 4 (four) hours as needed for Pain.    isosorbide mononitrate (IMDUR) 60 MG 24 hr tablet Take 1 tablet (60 mg total) by mouth once daily.    LORazepam (ATIVAN) 0.5 MG tablet Take 1 tablet (0.5 mg total) by mouth every morning.    losartan (COZAAR) 50 MG Tab Take 1 tablet (50 mg total) by mouth once daily.    metFORMIN (GLUCOPHAGE) 500 MG tablet Take 1 tablet (500 mg total) by mouth 2 (two) times daily.    montelukast (SINGULAIR) 10 mg tablet Take 10 mg by mouth once daily.    mupirocin (BACTROBAN) 2 % ointment Apply topically every other day.    mv-mn/folic ac/calcium/vit K1 (WOMEN'S 50 PLUS MULTIVITAMIN ORAL) Take 1 tablet by mouth once daily.     nitroGLYCERIN (NITROSTAT) 0.4 MG SL tablet Place 1 tablet (0.4 mg total) under the tongue every 5 (five) minutes as needed for Chest pain.    pantoprazole (PROTONIX) 40 MG tablet Take 1 tablet (40 mg total) by mouth once daily.    ranolazine (RANEXA) 1,000 mg Tb12 Take 1 tablet (1,000 mg total) by mouth 2 (two) times daily.    spironolactone (ALDACTONE) 25 MG tablet Take 1 tablet (25 mg total) by mouth once daily.    SYMBICORT 160-4.5 mcg/actuation HFAA Inhale 1 puff into the lungs 2 (two) times daily.    empagliflozin (JARDIANCE) 10 mg tablet Take 1 tablet (10 mg total) by mouth once daily. (Patient not taking: Reported on 9/10/2024)    predniSONE (DELTASONE) 50 MG Tab Take 50 mg ( 1 tablet ) by mouth 13 hours prior to your procedure (at 1 am)  ;  then again 7 hours prior ( at 7 am)  , and again  ( at 12 pm ) upon arrival to the hospital. (Patient not taking: Reported on 9/10/2024)     No current facility-administered medications for this visit.     Facility-Administered Medications Ordered in Other Visits   Medication    0.9%  NaCl infusion    sodium chloride 0.9% flush 5 mL    vancomycin (VANCOCIN) 1,000 mg in dextrose 5 % (D5W) 250 mL IVPB (Vial-Mate)       Review of Systems   Constitutional: Positive for malaise/fatigue.   Cardiovascular:  Positive for chest pain (atypical) and dyspnea on exertion. Negative for irregular heartbeat, leg swelling and palpitations.   Respiratory:  Positive for shortness of breath (on oxygen).    Hematologic/Lymphatic: Negative for bleeding problem.   Skin:  Negative for rash.   Musculoskeletal:  Positive for falls (mechanical fall). Negative for myalgias.   Gastrointestinal:  Negative for hematemesis, hematochezia and nausea.   Genitourinary:  Negative for hematuria.   Neurological:  Positive for focal weakness (legs), loss of balance, tremors and weakness.   Psychiatric/Behavioral:  Negative for altered mental status.    Allergic/Immunologic: Negative for persistent infections.  "      Objective:          BP (!) 136/48   Pulse 60   Ht 5' 4" (1.626 m)   Wt 94.8 kg (209 lb)   LMP 01/21/1973 (LMP Unknown)   BMI 35.87 kg/m²     Physical Exam  Vitals and nursing note reviewed.   Constitutional:       Appearance: Normal appearance. She is well-developed.   HENT:      Head: Normocephalic.      Nose: Nose normal.   Eyes:      Pupils: Pupils are equal, round, and reactive to light.   Cardiovascular:      Rate and Rhythm: Normal rate and regular rhythm.   Pulmonary:      Effort: No respiratory distress.   Chest:      Comments: Device to LUCW. Incision and pocket in good repair.  Incision well healed.  Musculoskeletal:         General: Normal range of motion.   Skin:     General: Skin is warm and dry.      Findings: No erythema.   Neurological:      Mental Status: She is alert and oriented to person, place, and time.   Psychiatric:         Speech: Speech normal.         Behavior: Behavior normal.       Lab Results   Component Value Date     05/22/2024    K 4.5 05/22/2024    MG 1.7 03/14/2024    BUN 24 (H) 05/22/2024    CREATININE 0.82 05/22/2024    ALT 12 03/14/2024    AST 16 03/14/2024    HGB 10.1 (L) 09/02/2024    HCT 32.0 (L) 09/02/2024    HCT 42 11/26/2019    TSH 3.327 02/09/2024    LDLCALC 62.4 (L) 03/22/2023       Recent Labs   Lab 09/30/22  1316 04/26/24  1255 05/22/24  0832   INR 1.2 1.1 1.1       Assessment:     1. Pacemaker    2. Atrial fibrillation with slow ventricular response    3. Atrial flutter, unspecified type    4. Chronic heart failure with preserved ejection fraction    5. Essential hypertension    6. Vasovagal syncope    7. On anticoagulant therapy        Plan:     In summary, Ms. Strong is an 79 y.o. female with severe AS s/p TAVR (4/2019), CVA/TIA, AF, DM, HFpEF, HTN here for follow up after CRT-P (LBBA pacing lead) implantation.   She is 3.5 mo s/p implantation of CRT-P with LBBA pacing lead. Ms. Strong is doing well from a device perspective with stable lead and " device function. ECG showing 100% BiV pacing with stable morphology and narrow QRS. 100% AF on eliquis. She initially felt symptom improvement after upgrade but has recently felt more weakness, SOB, tremor. Recent fall due to loss of balance. Has an upcoming appt with general cardiology. Atypical CP is chronic and she has a LHC from Feb 2024 showing nonobstructive CAD. Consider Watchman in the future if falls are recurrent.    Continue current medication regimen and device settings.   Follow up in device clinic as scheduled.   Follow up in EP clinic in 6 mo, sooner as needed.     *A copy of this note has been sent to Dr. Moeller*    Follow up in about 6 months (around 3/10/2025).    ------------------------------------------------------------------    SUMIT Lackey, NP-C  Cardiac Electrophysiology

## 2024-09-06 ENCOUNTER — HOSPITAL ENCOUNTER (OUTPATIENT)
Dept: WOUND CARE | Facility: HOSPITAL | Age: 79
Discharge: HOME | End: 2024-09-06
Attending: PREVENTIVE MEDICINE
Payer: MEDICARE

## 2024-09-06 VITALS
HEART RATE: 60 BPM | TEMPERATURE: 98 F | WEIGHT: 207 LBS | DIASTOLIC BLOOD PRESSURE: 66 MMHG | SYSTOLIC BLOOD PRESSURE: 153 MMHG | HEIGHT: 64 IN | BODY MASS INDEX: 35.34 KG/M2

## 2024-09-06 DIAGNOSIS — S51.812A SKIN TEAR OF LEFT FOREARM WITHOUT COMPLICATION, INITIAL ENCOUNTER: ICD-10-CM

## 2024-09-06 DIAGNOSIS — S61.511A TEAR OF SKIN OF RIGHT WRIST, INITIAL ENCOUNTER: Primary | ICD-10-CM

## 2024-09-06 PROCEDURE — 99204 OFFICE O/P NEW MOD 45 MIN: CPT | Mod: 25

## 2024-09-06 PROCEDURE — 97597 DBRDMT OPN WND 1ST 20 CM/<: CPT

## 2024-09-06 PROCEDURE — 11042 DBRDMT SUBQ TIS 1ST 20SQCM/<: CPT

## 2024-09-06 PROCEDURE — 97598 DBRDMT OPN WND ADDL 20CM/<: CPT

## 2024-09-06 RX ORDER — MUPIROCIN 20 MG/G
OINTMENT TOPICAL EVERY OTHER DAY
Qty: 22 G | Refills: 1 | Status: SHIPPED | OUTPATIENT
Start: 2024-09-06

## 2024-09-06 NOTE — PROCEDURES
"Debridement    Date/Time: 9/6/2024 8:28 AM    Performed by: Alton Villanueva MD  Authorized by: Alton Villanueva MD    Time out: Immediately prior to procedure a "time out" was called to verify the correct patient, procedure, equipment, support staff and site/side marked as required.    Consent Done?:  Yes (Written)  Local anesthesia used?: Yes    Local anesthetic:  Topical anesthetic    Wound Details:    Location:  Right hand    Type of Debridement:  Excisional       Length (cm):  4       Area (sq cm):  16.8       Width (cm):  4.2       Percent Debrided (%):  90       Depth (cm):  0.1       Total Area Debrided (sq cm):  15.12    Depth of debridement:  Epidermis/Dermis    Instruments:  Forceps and Scissors  Bleeding:  Minimal  Hemostasis Achieved: Yes  Method Used:  Pressure and Silver Nitrate    Additional wounds:  1    2nd Wound Details:     Location:  Right arm    Type of Debridement:  Excisional       Length (cm):  2.5       Area (sq cm):  5       Width (cm):  2       Percent Debrided (%):  100       Depth (cm):  0.1       Total Area Debrided (sq cm):  5    Depth of debridement:  Epidermis/Dermis    Instruments:  Forceps and Scissors  Patient tolerance:  Patient tolerated the procedure well with no immediate complications    "

## 2024-09-06 NOTE — PROGRESS NOTES
"                     Wound Care & Hyperbaric Medicine    Subjective:       Patient ID: Adriana Strong is a 79 y.o. female.    Chief Complaint: Wound Consult    New patient presents with right arm and hand wounds. Patient fell on 8/29/24 and went to the ED where the wounds were cleaned and dressed. She returned to the ED on 8/31, 9/2, and 9/3 due to bleeding through the bandage. 9/3/24 she held the Eliquis for two days per ED instructions. Right hand "feels tight", <3s capillary refill, no numbness no tingling, +2 radial pulse, fingers are edematous and  ecchymosis is present.       Review of Systems   All other systems reviewed and are negative.        Objective:     Vitals:    09/06/24 0903   BP: (!) 153/66   Pulse: 60   Temp: 97.8 °F (36.6 °C)         Physical Exam       Wound 08/31/24 1132 Skin Tear Right anterior;distal Arm #1 (Active)   08/31/24 1132   Present on Original Admission:    Primary Wound Type: Skin tear   Side: Right   Orientation: anterior;distal   Location: Arm   Wound Approximate Age at First Assessment (Weeks): 1 weeks   Wound Number: #1   Is this injury device related?: No   Incision Type:    Closure Method:    Wound Description (Comments):    Type:    Additional Comments:    Ankle-Brachial Index:    Pulses: +2 radial pulse   Removal Indication and Assessment:    Wound Outcome:    Dressing Appearance Intact;Dried drainage 09/06/24 0900   Drainage Amount Moderate 09/06/24 0900   Drainage Characteristics/Odor Sanguineous 09/06/24 0900   Appearance Red;Moist 09/06/24 0900   Tissue loss description Full thickness 09/06/24 0900   Red (%), Wound Tissue Color 100 % 09/06/24 0900   Periwound Area Intact;Dry;Ecchymotic 09/06/24 0900   Wound Edges Jagged 09/06/24 0900   Wound Length (cm) 1.9 cm 09/06/24 0900   Wound Width (cm) 0.5 cm 09/06/24 0900   Wound Depth (cm) 0.1 cm 09/06/24 0900   Wound Volume (cm^3) 0.095 cm^3 09/06/24 0900   Wound Surface Area (cm^2) 0.95 cm^2 09/06/24 0900   Care Cleansed " with:;Sterile normal saline;Debrided 09/06/24 0900   Dressing Applied;Non-adherent;Gauze;Cast padding 09/06/24 0900   Periwound Care Absorptive dressing applied;Dry periwound area maintained 09/06/24 0900   Dressing Change Due 09/08/24 09/06/24 0900            Wound 09/02/24 2200 Skin Tear Right Hand (Active)   09/02/24 2200   Present on Original Admission: Y   Primary Wound Type: Skin tear   Side: Right   Orientation:    Location: Hand   Wound Approximate Age at First Assessment (Weeks):    Wound Number:    Is this injury device related?: No   Incision Type:    Closure Method:    Wound Description (Comments):    Type:    Additional Comments:    Ankle-Brachial Index:    Pulses: +2 radial pulse   Removal Indication and Assessment:    Wound Outcome:    Wound Image    09/06/24 0900   Dressing Appearance Intact;Dried drainage;Moist drainage 09/06/24 0900   Drainage Amount Moderate 09/06/24 0900   Drainage Characteristics/Odor Sanguineous 09/06/24 0900   Appearance Red;Yellow;Moist 09/06/24 0900   Tissue loss description Full thickness 09/06/24 0900   Red (%), Wound Tissue Color 60 % 09/06/24 0900   Yellow (%), Wound Tissue Color 40 % 09/06/24 0900   Periwound Area Intact;Dry;Swelling 09/06/24 0900   Wound Edges Jagged 09/06/24 0900   Wound Length (cm) 4 cm 09/06/24 0900   Wound Width (cm) 4.2 cm 09/06/24 0900   Wound Depth (cm) 0.1 cm 09/06/24 0900   Wound Volume (cm^3) 1.68 cm^3 09/06/24 0900   Wound Surface Area (cm^2) 16.8 cm^2 09/06/24 0900   Care Cleansed with:;Sterile normal saline;Debrided 09/06/24 0900   Dressing Applied;Non-adherent;Gauze;Cast padding 09/06/24 0900   Periwound Care Absorptive dressing applied;Dry periwound area maintained 09/06/24 0900   Dressing Change Due 09/08/24 09/06/24 0900            Wound 09/06/24 0840 Skin Tear Right proximal;anterior Arm (Active)   09/06/24 0840   Present on Original Admission: Y   Primary Wound Type: Skin tear   Side: Right   Orientation: proximal;anterior    Location: Arm   Wound Approximate Age at First Assessment (Weeks): 1 weeks   Wound Number:    Is this injury device related?:    Incision Type:    Closure Method:    Wound Description (Comments):    Type:    Additional Comments:    Ankle-Brachial Index:    Pulses: +2 radial pulse   Removal Indication and Assessment:    Wound Outcome:    Wound Image   09/06/24 0900   Dressing Appearance Dried drainage;Moist drainage 09/06/24 0900   Drainage Amount Moderate 09/06/24 0900   Drainage Characteristics/Odor Sanguineous 09/06/24 0900   Appearance Red;Moist 09/06/24 0900   Tissue loss description Full thickness 09/06/24 0900   Red (%), Wound Tissue Color 100 % 09/06/24 0900   Periwound Area Intact;Dry;Ecchymotic 09/06/24 0900   Wound Edges Jagged 09/06/24 0900   Wound Length (cm) 2.5 cm 09/06/24 0900   Wound Width (cm) 2 cm 09/06/24 0900   Wound Depth (cm) 0.1 cm 09/06/24 0900   Wound Volume (cm^3) 0.5 cm^3 09/06/24 0900   Wound Surface Area (cm^2) 5 cm^2 09/06/24 0900   Care Cleansed with:;Sterile normal saline;Debrided 09/06/24 0900   Dressing Applied;Non-adherent;Gauze;Cast padding 09/06/24 0900   Periwound Care Absorptive dressing applied;Dry periwound area maintained 09/06/24 0900   Dressing Change Due 09/08/24 09/06/24 0900         Assessment/Plan:         ICD-10-CM ICD-9-CM   1. Tear of skin of right wrist, initial encounter  S61.511A 881.02   2. Skin tear of left forearm without complication, initial encounter  S51.812A 881.00       No signs of infection.    Tissue pathology and/or culture taken:  [] Yes [x] No   Sharp debridement performed:   [x] Yes [] No   Labs ordered this visit:   [] Yes [x] No   Imaging ordered this visit:   [] Yes [x] No           Orders Placed This Encounter   Procedures    Change dressing     Right hand and right arm wounds  Cleanse wound with: normal saline  Lidocaine: prn  Silver nitrate: prn  Periwound care: maintain dry periwound  Primary dressing: xeroform  Secondary dressing: gauze,  secure with cast padding, mefix tape and flexinetting  Edema: elevate arm when able  Frequency: every other day and as needed  Follow-up: with Dr Villanueva in 2 weeks  Home Health: Please admit to home health and continue with wound care as above.        Follow up in about 2 weeks (around 9/20/2024) for Dr Villanueva.            This includes face to face time and non-face to face time preparing to see the patient (eg, review of tests), obtaining and/or reviewing separately obtained history, documenting clinical information in the electronic or other health record, independently interpreting results and communicating results to the patient/family/caregiver, or care coordinator.

## 2024-09-10 ENCOUNTER — HOSPITAL ENCOUNTER (OUTPATIENT)
Dept: CARDIOLOGY | Facility: CLINIC | Age: 79
Discharge: HOME | End: 2024-09-10
Payer: MEDICARE

## 2024-09-10 ENCOUNTER — CLINICAL SUPPORT (OUTPATIENT)
Dept: CARDIOLOGY | Facility: HOSPITAL | Age: 79
End: 2024-09-10
Attending: INTERNAL MEDICINE
Payer: MEDICARE

## 2024-09-10 ENCOUNTER — OFFICE VISIT (OUTPATIENT)
Dept: ELECTROPHYSIOLOGY | Facility: CLINIC | Age: 79
End: 2024-09-10
Payer: MEDICARE

## 2024-09-10 VITALS
HEIGHT: 64 IN | DIASTOLIC BLOOD PRESSURE: 48 MMHG | BODY MASS INDEX: 35.68 KG/M2 | SYSTOLIC BLOOD PRESSURE: 136 MMHG | HEART RATE: 60 BPM | WEIGHT: 209 LBS

## 2024-09-10 DIAGNOSIS — I10 ESSENTIAL HYPERTENSION: ICD-10-CM

## 2024-09-10 DIAGNOSIS — Z95.0 PACEMAKER: Primary | ICD-10-CM

## 2024-09-10 DIAGNOSIS — I48.91 ATRIAL FIBRILLATION WITH SLOW VENTRICULAR RESPONSE: ICD-10-CM

## 2024-09-10 DIAGNOSIS — I50.23 ACUTE ON CHRONIC SYSTOLIC HEART FAILURE: ICD-10-CM

## 2024-09-10 DIAGNOSIS — I48.92 ATRIAL FLUTTER, UNSPECIFIED TYPE: ICD-10-CM

## 2024-09-10 DIAGNOSIS — Z79.01 ON ANTICOAGULANT THERAPY: ICD-10-CM

## 2024-09-10 DIAGNOSIS — R55 VASOVAGAL SYNCOPE: ICD-10-CM

## 2024-09-10 DIAGNOSIS — I50.32 CHRONIC HEART FAILURE WITH PRESERVED EJECTION FRACTION: ICD-10-CM

## 2024-09-10 LAB
OHS QRS DURATION: 98 MS
OHS QTC CALCULATION: 450 MS

## 2024-09-10 PROCEDURE — 1100F PTFALLS ASSESS-DOCD GE2>/YR: CPT | Mod: CPTII,S$GLB,, | Performed by: NURSE PRACTITIONER

## 2024-09-10 PROCEDURE — 93005 ELECTROCARDIOGRAM TRACING: CPT | Mod: S$GLB,,, | Performed by: INTERNAL MEDICINE

## 2024-09-10 PROCEDURE — 1159F MED LIST DOCD IN RCRD: CPT | Mod: CPTII,S$GLB,, | Performed by: NURSE PRACTITIONER

## 2024-09-10 PROCEDURE — 3075F SYST BP GE 130 - 139MM HG: CPT | Mod: CPTII,S$GLB,, | Performed by: NURSE PRACTITIONER

## 2024-09-10 PROCEDURE — 99999 PR PBB SHADOW E&M-EST. PATIENT-LVL V: CPT | Mod: PBBFAC,,, | Performed by: NURSE PRACTITIONER

## 2024-09-10 PROCEDURE — 1157F ADVNC CARE PLAN IN RCRD: CPT | Mod: CPTII,S$GLB,, | Performed by: NURSE PRACTITIONER

## 2024-09-10 PROCEDURE — 93010 ELECTROCARDIOGRAM REPORT: CPT | Mod: S$GLB,,, | Performed by: INTERNAL MEDICINE

## 2024-09-10 PROCEDURE — 1160F RVW MEDS BY RX/DR IN RCRD: CPT | Mod: CPTII,S$GLB,, | Performed by: NURSE PRACTITIONER

## 2024-09-10 PROCEDURE — 3288F FALL RISK ASSESSMENT DOCD: CPT | Mod: CPTII,S$GLB,, | Performed by: NURSE PRACTITIONER

## 2024-09-10 PROCEDURE — 99214 OFFICE O/P EST MOD 30 MIN: CPT | Mod: S$GLB,,, | Performed by: NURSE PRACTITIONER

## 2024-09-10 PROCEDURE — 1126F AMNT PAIN NOTED NONE PRSNT: CPT | Mod: CPTII,S$GLB,, | Performed by: NURSE PRACTITIONER

## 2024-09-10 PROCEDURE — 3078F DIAST BP <80 MM HG: CPT | Mod: CPTII,S$GLB,, | Performed by: NURSE PRACTITIONER

## 2024-09-10 RX ORDER — ESTRADIOL 0.1 MG/G
CREAM VAGINAL
COMMUNITY
Start: 2024-08-26

## 2024-09-10 RX ORDER — FLUCONAZOLE 150 MG/1
150 TABLET ORAL
COMMUNITY
Start: 2024-05-20

## 2024-09-11 LAB
OHS CV BIV PACING PERCENT: 100 %
OHS CV DC REMOTE DEVICE TYPE: NORMAL
OHS CV RV PACING PERCENT: 0 %

## 2024-09-12 PROBLEM — S61.511A TEAR OF SKIN OF RIGHT WRIST: Status: ACTIVE | Noted: 2024-09-12

## 2024-09-20 ENCOUNTER — HOSPITAL ENCOUNTER (OUTPATIENT)
Dept: WOUND CARE | Facility: HOSPITAL | Age: 79
Discharge: HOME | End: 2024-09-20
Attending: PREVENTIVE MEDICINE
Payer: MEDICARE

## 2024-09-20 VITALS — TEMPERATURE: 98 F | HEART RATE: 60 BPM | SYSTOLIC BLOOD PRESSURE: 123 MMHG | DIASTOLIC BLOOD PRESSURE: 56 MMHG

## 2024-09-20 DIAGNOSIS — S61.511D: Primary | ICD-10-CM

## 2024-09-20 DIAGNOSIS — S51.812A SKIN TEAR OF LEFT FOREARM WITHOUT COMPLICATION, INITIAL ENCOUNTER: ICD-10-CM

## 2024-09-20 PROCEDURE — 99213 OFFICE O/P EST LOW 20 MIN: CPT

## 2024-09-20 NOTE — PROGRESS NOTES
Wound Care & Hyperbaric Medicine    Subjective:       Patient ID: Adriana Strong is a 79 y.o. female.    Chief Complaint: Non-healing Wound Follow Up     Follow up related to right hand and arm wounds. No acute complaints.     Review of Systems      Objective:     Vitals:    09/20/24 1140   BP: (!) 123/56   Pulse: 60   Temp: 97.8 °F (36.6 °C)         Physical Exam       Wound 08/31/24 1132 Skin Tear Right anterior;distal Arm #1 (Active)   08/31/24 1132   Present on Original Admission:    Primary Wound Type: Skin tear   Side: Right   Orientation: anterior;distal   Location: Arm   Wound Approximate Age at First Assessment (Weeks): 1 weeks   Wound Number: #1   Is this injury device related?: No   Incision Type:    Closure Method:    Wound Description (Comments):    Type:    Additional Comments:    Ankle-Brachial Index:    Pulses: +2 radial pulse   Removal Indication and Assessment:    Wound Outcome:    Wound Image   09/20/24 1140   Dressing Appearance Clean;Dry;Intact 09/20/24 1140   Drainage Amount None 09/20/24 1140   Appearance Black;Dry 09/20/24 1140   Tissue loss description Not applicable 09/20/24 1140   Black (%), Wound Tissue Color 100 % 09/20/24 1140   Periwound Area Intact;Dry 09/20/24 1140   Wound Edges Rolled/closed 09/20/24 1140   Wound Length (cm) 0.3 cm 09/20/24 1140   Wound Width (cm) 0.6 cm 09/20/24 1140   Wound Depth (cm) 0 cm 09/20/24 1140   Wound Volume (cm^3) 0 cm^3 09/20/24 1140   Wound Surface Area (cm^2) 0.18 cm^2 09/20/24 1140   Care Cleansed with:;Sterile normal saline 09/20/24 1140            Wound 09/02/24 2200 Skin Tear Right Hand (Active)   09/02/24 2200   Present on Original Admission: Y   Primary Wound Type: Skin tear   Side: Right   Orientation:    Location: Hand   Wound Approximate Age at First Assessment (Weeks):    Wound Number:    Is this injury device related?: No   Incision Type:    Closure Method:    Wound Description (Comments):    Type:    Additional  Comments:    Ankle-Brachial Index:    Pulses: +2 radial pulse   Removal Indication and Assessment:    Wound Outcome:    Wound Image   09/20/24 1140   Dressing Appearance Intact;Dried drainage 09/20/24 1140   Drainage Amount Small 09/20/24 1140   Drainage Characteristics/Odor Sanguineous 09/20/24 1140   Appearance Red;Moist 09/20/24 1140   Tissue loss description Full thickness 09/20/24 1140   Red (%), Wound Tissue Color 100 % 09/20/24 1140   Periwound Area Intact;Dry 09/20/24 1140   Wound Edges Jagged 09/20/24 1140   Wound Length (cm) 0.9 cm 09/20/24 1140   Wound Width (cm) 1.2 cm 09/20/24 1140   Wound Depth (cm) 0.1 cm 09/20/24 1140   Wound Volume (cm^3) 0.108 cm^3 09/20/24 1140   Wound Surface Area (cm^2) 1.08 cm^2 09/20/24 1140   Care Cleansed with:;Sterile normal saline 09/20/24 1140   Dressing Applied;Non-adherent;Island/border 09/20/24 1140   Periwound Care Absorptive dressing applied;Dry periwound area maintained 09/20/24 1140   Dressing Change Due 09/23/24 09/20/24 1140            Wound 09/06/24 0840 Skin Tear Right proximal;anterior Arm (Active)   09/06/24 0840   Present on Original Admission: Y   Primary Wound Type: Skin tear   Side: Right   Orientation: proximal;anterior   Location: Arm   Wound Approximate Age at First Assessment (Weeks): 1 weeks   Wound Number:    Is this injury device related?:    Incision Type:    Closure Method:    Wound Description (Comments):    Type:    Additional Comments:    Ankle-Brachial Index:    Pulses: +2 radial pulse   Removal Indication and Assessment:    Wound Outcome:    Dressing Appearance Dry;Intact;Clean 09/20/24 1140   Drainage Amount None 09/20/24 1140   Appearance Closed/resurfaced 09/20/24 1140   Tissue loss description Not applicable 09/20/24 1140   Periwound Area Intact;Dry 09/20/24 1140   Wound Edges Rolled/closed 09/20/24 1140   Wound Length (cm) 0 cm 09/20/24 1140   Wound Width (cm) 0 cm 09/20/24 1140   Wound Depth (cm) 0 cm 09/20/24 1140   Wound Volume  (cm^3) 0 cm^3 09/20/24 1140   Wound Surface Area (cm^2) 0 cm^2 09/20/24 1140   Care Cleansed with:;Sterile normal saline 09/20/24 1140         Assessment/Plan:         ICD-10-CM ICD-9-CM   1. Tear of skin of right wrist, subsequent encounter  S61.511D V58.89     881.02   2. Skin tear of left forearm without complication, initial encounter  S51.812A 881.00       Right arm wounds are closed. Wound improvement noted to right hand wounds. Plan of care updated. Orders faxed to home health.      Tissue pathology and/or culture taken:  [] Yes [x] No   Sharp debridement performed:   [] Yes [x] No   Labso rdered this visit:   [] Yes [x] No   Imaging ordered this visit:   [] Yes [x] No           Orders Placed This Encounter   Procedures    Change dressing     Right hand wound  Cleanse wound with: normal saline   Lidocaine: prn   Silver nitrate: prn   Periwound care: maintain dry periwound   Primary dressing: xeroform to all open areas  Secondary dressing: mepilex border dressing  Edema: elevate arm when able   Frequency: every other day and as needed   Follow-up: with Dr Villanueva as needed  Home Health: Please continue with wound care as above for the next two weeks, ok to discharge after two weeks.        Follow up if symptoms worsen or fail to improve.            This includes face to face time and non-face to face time preparing to see the patient (eg, review of tests), obtaining and/or reviewing separately obtained history, documenting clinical information in the electronic or other health record, independently interpreting results and communicating results to the patient/family/caregiver, or care coordinator.

## 2024-09-23 ENCOUNTER — TELEPHONE (OUTPATIENT)
Dept: CARDIOLOGY | Facility: CLINIC | Age: 79
End: 2024-09-23
Payer: MEDICARE

## 2024-09-23 NOTE — TELEPHONE ENCOUNTER
Pt c/o SOB all the time, worse at night when lying down, sats % on oxygen.   She took half lasix only then took another one (60 mg total?). As she is scheduled tomorrow w. dr Raymundo K + EKG for f/u, I told her to stay off the salt, f/u in Am , go to ER if breathing worsens. She verbalized understanding.

## 2024-09-24 ENCOUNTER — OFFICE VISIT (OUTPATIENT)
Dept: CARDIOLOGY | Facility: CLINIC | Age: 79
End: 2024-09-24
Payer: MEDICARE

## 2024-09-24 ENCOUNTER — HOSPITAL ENCOUNTER (OUTPATIENT)
Dept: CARDIOLOGY | Facility: CLINIC | Age: 79
Discharge: HOME | End: 2024-09-24
Payer: MEDICARE

## 2024-09-24 ENCOUNTER — HOSPITAL ENCOUNTER (INPATIENT)
Facility: HOSPITAL | Age: 79
LOS: 4 days | Discharge: HOME | DRG: 291 | End: 2024-09-28
Attending: EMERGENCY MEDICINE | Admitting: EMERGENCY MEDICINE
Payer: MEDICARE

## 2024-09-24 VITALS
DIASTOLIC BLOOD PRESSURE: 63 MMHG | HEIGHT: 64 IN | HEART RATE: 60 BPM | BODY MASS INDEX: 36.02 KG/M2 | WEIGHT: 211 LBS | SYSTOLIC BLOOD PRESSURE: 156 MMHG

## 2024-09-24 DIAGNOSIS — J42 CHRONIC BRONCHITIS, UNSPECIFIED CHRONIC BRONCHITIS TYPE: ICD-10-CM

## 2024-09-24 DIAGNOSIS — I50.43 ACUTE ON CHRONIC COMBINED SYSTOLIC AND DIASTOLIC CONGESTIVE HEART FAILURE: ICD-10-CM

## 2024-09-24 DIAGNOSIS — R07.9 CHEST PAIN: ICD-10-CM

## 2024-09-24 DIAGNOSIS — I50.23 ACUTE ON CHRONIC SYSTOLIC HEART FAILURE: ICD-10-CM

## 2024-09-24 DIAGNOSIS — Z95.2 S/P TAVR (TRANSCATHETER AORTIC VALVE REPLACEMENT): ICD-10-CM

## 2024-09-24 DIAGNOSIS — I50.9 ACUTE ON CHRONIC CONGESTIVE HEART FAILURE, UNSPECIFIED HEART FAILURE TYPE: Primary | ICD-10-CM

## 2024-09-24 DIAGNOSIS — Z95.0 PRESENCE OF CARDIAC PACEMAKER: ICD-10-CM

## 2024-09-24 DIAGNOSIS — R06.02 SHORTNESS OF BREATH: ICD-10-CM

## 2024-09-24 DIAGNOSIS — I48.91 ATRIAL FIBRILLATION WITH SLOW VENTRICULAR RESPONSE: ICD-10-CM

## 2024-09-24 DIAGNOSIS — E11.9 TYPE 2 DIABETES MELLITUS WITHOUT COMPLICATION, WITHOUT LONG-TERM CURRENT USE OF INSULIN: ICD-10-CM

## 2024-09-24 DIAGNOSIS — J96.11 CHRONIC RESPIRATORY FAILURE WITH HYPOXIA: ICD-10-CM

## 2024-09-24 DIAGNOSIS — I50.23 ACUTE ON CHRONIC SYSTOLIC HEART FAILURE: Primary | ICD-10-CM

## 2024-09-24 DIAGNOSIS — I25.111 CORONARY ARTERY DISEASE INVOLVING NATIVE CORONARY ARTERY OF NATIVE HEART WITH ANGINA PECTORIS WITH DOCUMENTED SPASM: ICD-10-CM

## 2024-09-24 DIAGNOSIS — I10 ESSENTIAL HYPERTENSION: ICD-10-CM

## 2024-09-24 DIAGNOSIS — R68.89 MULTIPLE COMPLAINTS: ICD-10-CM

## 2024-09-24 LAB
ALBUMIN SERPL BCP-MCNC: 3.7 G/DL (ref 3.5–5.2)
ALP SERPL-CCNC: 36 U/L (ref 55–135)
ALT SERPL W/O P-5'-P-CCNC: 11 U/L (ref 10–44)
ANION GAP SERPL CALC-SCNC: 9 MMOL/L (ref 8–16)
AST SERPL-CCNC: 17 U/L (ref 10–40)
BASOPHILS # BLD AUTO: 0.05 K/UL (ref 0–0.2)
BASOPHILS NFR BLD: 0.8 % (ref 0–1.9)
BILIRUB SERPL-MCNC: 0.5 MG/DL (ref 0.1–1)
BNP SERPL-MCNC: 425 PG/ML (ref 0–99)
BUN SERPL-MCNC: 22 MG/DL (ref 8–23)
CALCIUM SERPL-MCNC: 9.3 MG/DL (ref 8.7–10.5)
CHLORIDE SERPL-SCNC: 96 MMOL/L (ref 95–110)
CO2 SERPL-SCNC: 28 MMOL/L (ref 23–29)
CREAT SERPL-MCNC: 0.8 MG/DL (ref 0.5–1.4)
DIFFERENTIAL METHOD BLD: ABNORMAL
EOSINOPHIL # BLD AUTO: 0 K/UL (ref 0–0.5)
EOSINOPHIL NFR BLD: 0.3 % (ref 0–8)
ERYTHROCYTE [DISTWIDTH] IN BLOOD BY AUTOMATED COUNT: 15.8 % (ref 11.5–14.5)
EST. GFR  (NO RACE VARIABLE): >60 ML/MIN/1.73 M^2
GLUCOSE SERPL-MCNC: 96 MG/DL (ref 70–110)
HCT VFR BLD AUTO: 34.3 % (ref 37–48.5)
HCV AB SERPL QL IA: NORMAL
HGB BLD-MCNC: 10.6 G/DL (ref 12–16)
HIV 1+2 AB+HIV1 P24 AG SERPL QL IA: NORMAL
IMM GRANULOCYTES # BLD AUTO: 0.02 K/UL (ref 0–0.04)
IMM GRANULOCYTES NFR BLD AUTO: 0.3 % (ref 0–0.5)
LYMPHOCYTES # BLD AUTO: 1.3 K/UL (ref 1–4.8)
LYMPHOCYTES NFR BLD: 22.6 % (ref 18–48)
MCH RBC QN AUTO: 29.5 PG (ref 27–31)
MCHC RBC AUTO-ENTMCNC: 30.9 G/DL (ref 32–36)
MCV RBC AUTO: 96 FL (ref 82–98)
MONOCYTES # BLD AUTO: 0.6 K/UL (ref 0.3–1)
MONOCYTES NFR BLD: 9.8 % (ref 4–15)
NEUTROPHILS # BLD AUTO: 3.9 K/UL (ref 1.8–7.7)
NEUTROPHILS NFR BLD: 66.2 % (ref 38–73)
NRBC BLD-RTO: 0 /100 WBC
OHS QRS DURATION: 106 MS
OHS QTC CALCULATION: 452 MS
PLATELET # BLD AUTO: 262 K/UL (ref 150–450)
PMV BLD AUTO: 10 FL (ref 9.2–12.9)
POTASSIUM SERPL-SCNC: 4.9 MMOL/L (ref 3.5–5.1)
PROT SERPL-MCNC: 6.9 G/DL (ref 6–8.4)
RBC # BLD AUTO: 3.59 M/UL (ref 4–5.4)
SODIUM SERPL-SCNC: 133 MMOL/L (ref 136–145)
TROPONIN I SERPL DL<=0.01 NG/ML-MCNC: <0.006 NG/ML (ref 0–0.03)
WBC # BLD AUTO: 5.89 K/UL (ref 3.9–12.7)

## 2024-09-24 PROCEDURE — 83880 ASSAY OF NATRIURETIC PEPTIDE: CPT | Performed by: EMERGENCY MEDICINE

## 2024-09-24 PROCEDURE — 87389 HIV-1 AG W/HIV-1&-2 AB AG IA: CPT | Performed by: PHYSICIAN ASSISTANT

## 2024-09-24 PROCEDURE — 3077F SYST BP >= 140 MM HG: CPT | Mod: CPTII,GC,S$GLB, | Performed by: STUDENT IN AN ORGANIZED HEALTH CARE EDUCATION/TRAINING PROGRAM

## 2024-09-24 PROCEDURE — 99999 PR PBB SHADOW E&M-EST. PATIENT-LVL V: CPT | Mod: PBBFAC,GC,, | Performed by: STUDENT IN AN ORGANIZED HEALTH CARE EDUCATION/TRAINING PROGRAM

## 2024-09-24 PROCEDURE — 1126F AMNT PAIN NOTED NONE PRSNT: CPT | Mod: CPTII,GC,S$GLB, | Performed by: STUDENT IN AN ORGANIZED HEALTH CARE EDUCATION/TRAINING PROGRAM

## 2024-09-24 PROCEDURE — 99214 OFFICE O/P EST MOD 30 MIN: CPT | Mod: 25,GC,S$GLB, | Performed by: STUDENT IN AN ORGANIZED HEALTH CARE EDUCATION/TRAINING PROGRAM

## 2024-09-24 PROCEDURE — 20600001 HC STEP DOWN PRIVATE ROOM

## 2024-09-24 PROCEDURE — 1159F MED LIST DOCD IN RCRD: CPT | Mod: CPTII,GC,S$GLB, | Performed by: STUDENT IN AN ORGANIZED HEALTH CARE EDUCATION/TRAINING PROGRAM

## 2024-09-24 PROCEDURE — 1157F ADVNC CARE PLAN IN RCRD: CPT | Mod: CPTII,GC,S$GLB, | Performed by: STUDENT IN AN ORGANIZED HEALTH CARE EDUCATION/TRAINING PROGRAM

## 2024-09-24 PROCEDURE — 93005 ELECTROCARDIOGRAM TRACING: CPT

## 2024-09-24 PROCEDURE — 85025 COMPLETE CBC W/AUTO DIFF WBC: CPT | Performed by: EMERGENCY MEDICINE

## 2024-09-24 PROCEDURE — 81001 URINALYSIS AUTO W/SCOPE: CPT | Performed by: PHYSICIAN ASSISTANT

## 2024-09-24 PROCEDURE — 93010 ELECTROCARDIOGRAM REPORT: CPT | Mod: S$GLB,,, | Performed by: INTERNAL MEDICINE

## 2024-09-24 PROCEDURE — 93010 ELECTROCARDIOGRAM REPORT: CPT | Mod: ,,, | Performed by: INTERNAL MEDICINE

## 2024-09-24 PROCEDURE — 12000002 HC ACUTE/MED SURGE SEMI-PRIVATE ROOM

## 2024-09-24 PROCEDURE — 84484 ASSAY OF TROPONIN QUANT: CPT | Performed by: EMERGENCY MEDICINE

## 2024-09-24 PROCEDURE — 80053 COMPREHEN METABOLIC PANEL: CPT | Performed by: EMERGENCY MEDICINE

## 2024-09-24 PROCEDURE — 63600175 PHARM REV CODE 636 W HCPCS

## 2024-09-24 PROCEDURE — 3078F DIAST BP <80 MM HG: CPT | Mod: CPTII,GC,S$GLB, | Performed by: STUDENT IN AN ORGANIZED HEALTH CARE EDUCATION/TRAINING PROGRAM

## 2024-09-24 PROCEDURE — 86803 HEPATITIS C AB TEST: CPT | Performed by: PHYSICIAN ASSISTANT

## 2024-09-24 PROCEDURE — 99291 CRITICAL CARE FIRST HOUR: CPT

## 2024-09-24 RX ORDER — IBUPROFEN 200 MG
24 TABLET ORAL
Status: DISCONTINUED | OUTPATIENT
Start: 2024-09-24 | End: 2024-09-28 | Stop reason: HOSPADM

## 2024-09-24 RX ORDER — FUROSEMIDE 10 MG/ML
60 INJECTION INTRAMUSCULAR; INTRAVENOUS
Status: COMPLETED | OUTPATIENT
Start: 2024-09-24 | End: 2024-09-24

## 2024-09-24 RX ORDER — RANOLAZINE 500 MG/1
1000 TABLET, EXTENDED RELEASE ORAL 2 TIMES DAILY
Status: DISCONTINUED | OUTPATIENT
Start: 2024-09-24 | End: 2024-09-28 | Stop reason: HOSPADM

## 2024-09-24 RX ORDER — LOSARTAN POTASSIUM 50 MG/1
50 TABLET ORAL DAILY
Status: DISCONTINUED | OUTPATIENT
Start: 2024-09-25 | End: 2024-09-28 | Stop reason: HOSPADM

## 2024-09-24 RX ORDER — SPIRONOLACTONE 25 MG/1
25 TABLET ORAL DAILY
Status: DISCONTINUED | OUTPATIENT
Start: 2024-09-25 | End: 2024-09-26

## 2024-09-24 RX ORDER — LORAZEPAM 0.5 MG/1
0.5 TABLET ORAL DAILY
Status: DISCONTINUED | OUTPATIENT
Start: 2024-09-25 | End: 2024-09-25

## 2024-09-24 RX ORDER — INSULIN ASPART 100 [IU]/ML
0-5 INJECTION, SOLUTION INTRAVENOUS; SUBCUTANEOUS
Status: DISCONTINUED | OUTPATIENT
Start: 2024-09-24 | End: 2024-09-28 | Stop reason: HOSPADM

## 2024-09-24 RX ORDER — MONTELUKAST SODIUM 10 MG/1
10 TABLET ORAL DAILY
Status: DISCONTINUED | OUTPATIENT
Start: 2024-09-25 | End: 2024-09-28 | Stop reason: HOSPADM

## 2024-09-24 RX ORDER — TALC
6 POWDER (GRAM) TOPICAL NIGHTLY PRN
Status: DISCONTINUED | OUTPATIENT
Start: 2024-09-24 | End: 2024-09-28 | Stop reason: HOSPADM

## 2024-09-24 RX ORDER — IPRATROPIUM BROMIDE AND ALBUTEROL SULFATE 2.5; .5 MG/3ML; MG/3ML
3 SOLUTION RESPIRATORY (INHALATION) EVERY 4 HOURS PRN
Status: DISCONTINUED | OUTPATIENT
Start: 2024-09-24 | End: 2024-09-28 | Stop reason: HOSPADM

## 2024-09-24 RX ORDER — CARVEDILOL 6.25 MG/1
6.25 TABLET ORAL 2 TIMES DAILY WITH MEALS
Status: DISCONTINUED | OUTPATIENT
Start: 2024-09-24 | End: 2024-09-28 | Stop reason: HOSPADM

## 2024-09-24 RX ORDER — PANTOPRAZOLE SODIUM 40 MG/1
40 TABLET, DELAYED RELEASE ORAL DAILY
Status: DISCONTINUED | OUTPATIENT
Start: 2024-09-25 | End: 2024-09-28 | Stop reason: HOSPADM

## 2024-09-24 RX ORDER — GABAPENTIN 300 MG/1
600 CAPSULE ORAL 3 TIMES DAILY
Status: DISCONTINUED | OUTPATIENT
Start: 2024-09-24 | End: 2024-09-28 | Stop reason: HOSPADM

## 2024-09-24 RX ORDER — POLYETHYLENE GLYCOL 3350 17 G/17G
17 POWDER, FOR SOLUTION ORAL DAILY PRN
Status: DISCONTINUED | OUTPATIENT
Start: 2024-09-24 | End: 2024-09-28 | Stop reason: HOSPADM

## 2024-09-24 RX ORDER — ISOSORBIDE MONONITRATE 60 MG/1
60 TABLET, EXTENDED RELEASE ORAL DAILY
Status: DISCONTINUED | OUTPATIENT
Start: 2024-09-25 | End: 2024-09-28 | Stop reason: HOSPADM

## 2024-09-24 RX ORDER — DULOXETIN HYDROCHLORIDE 20 MG/1
20 CAPSULE, DELAYED RELEASE ORAL DAILY
Status: DISCONTINUED | OUTPATIENT
Start: 2024-09-25 | End: 2024-09-28 | Stop reason: HOSPADM

## 2024-09-24 RX ORDER — ACETAMINOPHEN 325 MG/1
650 TABLET ORAL EVERY 4 HOURS PRN
Status: DISCONTINUED | OUTPATIENT
Start: 2024-09-24 | End: 2024-09-28 | Stop reason: HOSPADM

## 2024-09-24 RX ORDER — CLOPIDOGREL BISULFATE 75 MG/1
75 TABLET ORAL DAILY
Status: DISCONTINUED | OUTPATIENT
Start: 2024-09-25 | End: 2024-09-28 | Stop reason: HOSPADM

## 2024-09-24 RX ORDER — SODIUM CHLORIDE 0.9 % (FLUSH) 0.9 %
10 SYRINGE (ML) INJECTION
Status: DISCONTINUED | OUTPATIENT
Start: 2024-09-24 | End: 2024-09-28 | Stop reason: HOSPADM

## 2024-09-24 RX ORDER — IBUPROFEN 200 MG
16 TABLET ORAL
Status: DISCONTINUED | OUTPATIENT
Start: 2024-09-24 | End: 2024-09-28 | Stop reason: HOSPADM

## 2024-09-24 RX ORDER — BISACODYL 10 MG/1
10 SUPPOSITORY RECTAL DAILY PRN
Status: DISCONTINUED | OUTPATIENT
Start: 2024-09-24 | End: 2024-09-28 | Stop reason: HOSPADM

## 2024-09-24 RX ORDER — ATORVASTATIN CALCIUM 10 MG/1
10 TABLET, FILM COATED ORAL DAILY
Status: DISCONTINUED | OUTPATIENT
Start: 2024-09-25 | End: 2024-09-28 | Stop reason: HOSPADM

## 2024-09-24 RX ORDER — GLUCAGON 1 MG
1 KIT INJECTION
Status: DISCONTINUED | OUTPATIENT
Start: 2024-09-24 | End: 2024-09-28 | Stop reason: HOSPADM

## 2024-09-24 RX ADMIN — FUROSEMIDE 60 MG: 10 INJECTION, SOLUTION INTRAMUSCULAR; INTRAVENOUS at 10:09

## 2024-09-24 NOTE — PROGRESS NOTES
Clinic Note  9/24/2024      Subjective:       Patient ID:  Adriana is a 79 y.o. female being seen for an urgent care visit.    Chief Complaint: Coronary Artery Disease, Congestive Heart Failure, Hypertension, and Hyperlipidemia    Adriana tSrong is an 79 year old female with paroxysmal atrial fibrillation with slow ventricular response (previously on PPM, now with recent upgrade to Bi-V),  HTN, HLD, non-obstructive CAD ( [facility] in 02/2024), HFrEF (EF 35-40%), COPD, chronic hypoxemic respiratory failure on 2L home O2 who presents for f/u. Patient routinely sees Dr. Landis.    Patient is accompanied by her daughters. She reports 2 month hx of dyspnea. She is not active. She reports orthopnea (feels more dyspneic when she lays on her recliner bed). Also reports LE edema. She denies chest pain, palpitations, no recent changes in weight. Daughter states she is adherent to fluid restriction. Recently had a small portion of okra and shrimp soup. Patient's  manages her medications and daughter states her  received half doses of her lasix prescription for only about 2 days. She typically takes furosemide 40mg in AM, 20mg in PM. States she makes sufficient urine. She feels very weak especially in her legs. Daughter reports some tremors as well.         Review of Systems   Constitutional:  Negative for chills, fever and malaise/fatigue.   Respiratory:  Positive for shortness of breath.    Cardiovascular:  Positive for orthopnea, leg swelling and PND. Negative for chest pain and palpitations.       Medication List with Changes/Refills   Current Medications    ACETAMINOPHEN (TYLENOL) 650 MG TBSR    Take 1,300 mg by mouth 2 (two) times daily as needed.    ALBUTEROL-IPRATROPIUM (DUO-NEB) 2.5 MG-0.5 MG/3 ML NEBULIZER SOLUTION    Take 3 mLs by nebulization every 4 (four) hours as needed for Wheezing or Shortness of Breath.    APIXABAN (ELIQUIS) 5 MG TAB    Take 1 tablet (5 mg total) by mouth 2 (two) times daily.     ASCORBIC ACID, VITAMIN C, (VITAMIN C) 500 MG TABLET    Take 1,000 mg by mouth once daily.     ATORVASTATIN (LIPITOR) 10 MG TABLET    Take 1 tablet (10 mg total) by mouth once daily.    CALCIUM CARBONATE/VITAMIN D3 (CALTRATE 600 + D ORAL)    Take 1 tablet by mouth once daily.    CARVEDILOL (COREG) 6.25 MG TABLET    Take 1 tablet (6.25 mg total) by mouth 2 (two) times daily with meals.    CIMETIDINE (TAGAMET) 300 MG TABLET    Take 300 mg  by mouth  13 hours prior to your procedure (at 1 am) ;  then again 7 hours prior ( at 7 am)  , and again ( at 12 pm ) upon arrival to the hospital.    CLOPIDOGREL (PLAVIX) 75 MG TABLET    Take 1 tablet (75 mg total) by mouth once daily.    DIPHENHYDRAMINE (BENADRYL) 25 MG CAPSULE    Take 50 mg ( 2 tablets ) by mouth 13 hours prior to your procedure (at 1 am) ;  then again 7 hours prior ( at 7 am)  , and again  ( at 12 pm ) upon arrival to the hospital.    DULOXETINE (CYMBALTA) 20 MG CAPSULE    Take 1 capsule (20 mg total) by mouth once daily.    EMPAGLIFLOZIN (JARDIANCE) 10 MG TABLET    Take 1 tablet (10 mg total) by mouth once daily.    ESTRADIOL (ESTRACE) 0.01 % (0.1 MG/GRAM) VAGINAL CREAM    Place vaginally.    FEXOFENADINE (ALLEGRA) 60 MG TABLET    Take 60 mg by mouth once daily.    FLUCONAZOLE (DIFLUCAN) 150 MG TAB    Take 150 mg by mouth.    FUROSEMIDE (LASIX) 40 MG TABLET    Take 1 tablet (40 mg total) by mouth 2 (two) times a day.    GABAPENTIN (NEURONTIN) 600 MG TABLET    Take 600 mg by mouth 3 (three) times daily.    HYDROCODONE-ACETAMINOPHEN (NORCO) 5-325 MG PER TABLET    Take 1 tablet by mouth every 4 (four) hours as needed for Pain.    ISOSORBIDE MONONITRATE (IMDUR) 60 MG 24 HR TABLET    Take 1 tablet (60 mg total) by mouth once daily.    LORAZEPAM (ATIVAN) 0.5 MG TABLET    Take 1 tablet (0.5 mg total) by mouth every morning.    LOSARTAN (COZAAR) 50 MG TAB    Take 1 tablet (50 mg total) by mouth once daily.    METFORMIN (GLUCOPHAGE) 500 MG TABLET    Take 1 tablet (500 mg  total) by mouth 2 (two) times daily.    MONTELUKAST (SINGULAIR) 10 MG TABLET    Take 10 mg by mouth once daily.    MUPIROCIN (BACTROBAN) 2 % OINTMENT    Apply topically every other day.    MV-MN/FOLIC AC/CALCIUM/VIT K1 (WOMEN'S 50 PLUS MULTIVITAMIN ORAL)    Take 1 tablet by mouth once daily.    NITROGLYCERIN (NITROSTAT) 0.4 MG SL TABLET    Place 1 tablet (0.4 mg total) under the tongue every 5 (five) minutes as needed for Chest pain.    PANTOPRAZOLE (PROTONIX) 40 MG TABLET    Take 1 tablet (40 mg total) by mouth once daily.    PREDNISONE (DELTASONE) 50 MG TAB    Take 50 mg ( 1 tablet ) by mouth 13 hours prior to your procedure (at 1 am)  ;  then again 7 hours prior ( at 7 am)  , and again  ( at 12 pm ) upon arrival to the hospital.    RANOLAZINE (RANEXA) 1,000 MG TB12    Take 1 tablet (1,000 mg total) by mouth 2 (two) times daily.    SPIRONOLACTONE (ALDACTONE) 25 MG TABLET    Take 1 tablet (25 mg total) by mouth once daily.    SYMBICORT 160-4.5 MCG/ACTUATION HFAA    Inhale 1 puff into the lungs 2 (two) times daily.       Patient Active Problem List   Diagnosis    Enlarged ovary    Type 2 diabetes mellitus    Severe persistent asthma with acute exacerbation    HLD (hyperlipidemia)    GERD (gastroesophageal reflux disease)    Chronic mesenteric ischemia    Mesenteric artery insufficiency    Gastroesophageal reflux disease    Essential hypertension    Pure hypercholesterolemia    Permanent atrial fibrillation    Old lacunar stroke without late effect    COPD exacerbation    Constipation    Aortic stenosis    Umbilical hernia without obstruction and without gangrene    Incisional hernia, without obstruction or gangrene    COPD (chronic obstructive pulmonary disease)    Costochondritis    Diabetic polyneuropathy associated with type 2 diabetes mellitus    DDD (degenerative disc disease), lumbar    Cataract    Low back pain    Difficulty walking    Muscle weakness    Balance problems    Decreased range of motion of trunk  "and back    Lumbar spondylosis    Neuroforaminal stenosis of lumbar spine    Spinal stenosis of lumbar region with neurogenic claudication    Lumbar radiculopathy    S/P TAVR (transcatheter aortic valve replacement)    Obesity    Vasovagal syncope    TACOS (acute kidney injury)    Headache    Atrial flutter    Hyponatremia    Leukocytosis    Dysuria    Comfort measures only status    Acute cystitis with hematuria    Other chest pain    CAD (coronary artery disease)    Asthma in adult    Chronic heart failure with preserved ejection fraction    Localized edema    History of transcatheter aortic valve replacement (TAVR)    Coronary artery disease involving native coronary artery of native heart without angina pectoris    Aortic atherosclerosis    Numbness and tingling    Seizure-like activity    Occlusion of superior mesenteric artery    Pacemaker    Chest discomfort    Chronic systolic heart failure    Severe obesity with body mass index (BMI) of 35.0 to 39.9 with comorbidity    Atrial fibrillation with slow ventricular response    HFrEF (heart failure with reduced ejection fraction)    Tear of skin of right wrist           Objective:      BP (!) 156/63   Pulse 60   Ht 5' 4" (1.626 m)   Wt 95.7 kg (211 lb)   LMP 01/21/1973 (LMP Unknown)   BMI 36.22 kg/m²   Estimated body mass index is 36.22 kg/m² as calculated from the following:    Height as of this encounter: 5' 4" (1.626 m).    Weight as of this encounter: 95.7 kg (211 lb).  Physical Exam  Constitutional:       General: She is not in acute distress.     Appearance: She is obese. She is not ill-appearing, toxic-appearing or diaphoretic.   HENT:      Head: Normocephalic.      Nose: Nose normal.   Eyes:      Extraocular Movements: Extraocular movements intact.   Neck:      Vascular: JVD present.   Cardiovascular:      Rate and Rhythm: Normal rate and regular rhythm.      Pulses: Normal pulses.      Heart sounds: Normal heart sounds. No murmur heard.  Pulmonary:    "   Effort: Pulmonary effort is normal. No respiratory distress.      Breath sounds: Rales present. No wheezing.   Abdominal:      General: Abdomen is flat. There is no distension.   Musculoskeletal:      Right lower leg: Edema present.      Left lower leg: Edema present.   Skin:     General: Skin is warm.   Neurological:      Mental Status: She is alert and oriented to person, place, and time. Mental status is at baseline.   Psychiatric:         Mood and Affect: Mood normal.         Behavior: Behavior normal.         Thought Content: Thought content normal.       ECG interpreted by me shows Bi- Vpaced      Assessment and Plan:     Adriana was seen today for coronary artery disease, congestive heart failure, hypertension and hyperlipidemia.    Diagnoses and all orders for this visit:    Acute on chronic systolic heart failure  Patient with NICM EF 35-40% with signs and symptoms indicative of acute on chronic heart failure. She appears volume overloaded today based on lung bibasilar crackles, LE edema, JVP.   Will send her to ER for further evaluation and  hospital admission for decompensated heart failure as she will require IV diuresis.    NICM  GDMT (carvedilol 6.25mg BID, spironolactone 25mg qd, losartan 50mg qd, jardiance (held by family due to concerns for hx of recurrent UTIs)    Paroxysmal Atrial fibrillation with slow ventricular response  Currently in NSR  Continue eliquis     S/P TAVR (transcatheter aortic valve replacement)  TAVR in 2019, functioning well on repeat TTE in February Aortic valve area by VTI is 1.03 cm². Aortic valve peak velocity is 2.07 m/s. Mean gradient is 11 mmHg. The dimensionless index is 0.30.     Presence of cardiac pacemaker  Underwent BiV upgrade in May  2024 by Dr Moeller    Chronic bronchitis, unspecified chronic bronchitis type  Managed by PCP    Hyperlipidemia   LDL 62 on atorvastatin 10mg qd    Type 2 diabetes mellitus without complication, without long-term current use of  insulin  Controlled    Chronic respiratory failure  On home 2L  O2.      Follow Up:   Follow up in about 3 months (around 12/24/2024).        Plan discussed with attending Dr. Francisco, further recommendations as per attending addendum. Please feel free to call with any questions or concerns.        Shikha Raymundo MD  Cardiovascular Disease  Fellow Physician - PGY6

## 2024-09-25 ENCOUNTER — DOCUMENTATION ONLY (OUTPATIENT)
Dept: CARDIOLOGY | Facility: CLINIC | Age: 79
End: 2024-09-25
Payer: MEDICARE

## 2024-09-25 PROBLEM — I50.43 ACUTE ON CHRONIC COMBINED SYSTOLIC AND DIASTOLIC CONGESTIVE HEART FAILURE: Status: ACTIVE | Noted: 2022-09-30

## 2024-09-25 LAB
ANION GAP SERPL CALC-SCNC: 10 MMOL/L (ref 8–16)
ASCENDING AORTA: 3.07 CM
AV AREA BY CONTINUOUS VTI: 1.9 CM2
AV INDEX (PROSTH): 0.57
AV LVOT MEAN GRADIENT: 2 MMHG
AV LVOT PEAK GRADIENT: 3 MMHG
AV MEAN GRADIENT: 6 MMHG
AV PEAK GRADIENT: 11 MMHG
AV VALVE AREA BY VELOCITY RATIO: 1.8 CM²
AV VALVE AREA: 1.9 CM2
AV VELOCITY RATIO: 0.54
BASOPHILS # BLD AUTO: 0.05 K/UL (ref 0–0.2)
BASOPHILS NFR BLD: 0.6 % (ref 0–1.9)
BILIRUB UR QL STRIP: NEGATIVE
BSA FOR ECHO PROCEDURE: 2.05 M2
BUN SERPL-MCNC: 19 MG/DL (ref 8–23)
CALCIUM SERPL-MCNC: 9.1 MG/DL (ref 8.7–10.5)
CHLORIDE SERPL-SCNC: 95 MMOL/L (ref 95–110)
CLARITY UR REFRACT.AUTO: CLEAR
CO2 SERPL-SCNC: 28 MMOL/L (ref 23–29)
COLOR UR AUTO: COLORLESS
CREAT SERPL-MCNC: 0.7 MG/DL (ref 0.5–1.4)
CV ECHO LV RWT: 0.48 CM
DIFFERENTIAL METHOD BLD: ABNORMAL
DOP CALC AO PEAK VEL: 1.64 M/S
DOP CALC AO VTI: 40.23 CM
DOP CALC LVOT AREA: 3.4 CM2
DOP CALC LVOT DIAMETER: 2.07 CM
DOP CALC LVOT PEAK VEL: 0.88 M/S
DOP CALC LVOT STROKE VOLUME: 76.62 CM3
DOP CALCLVOT PEAK VEL VTI: 22.78 CM
E WAVE DECELERATION TIME: 150.34 MS
E/A RATIO: 2.38
E/E' RATIO: 17.23 M/S
ECHO EF ESTIMATED: 56 %
ECHO LV POSTERIOR WALL: 1 CM (ref 0.6–1.1)
EOSINOPHIL # BLD AUTO: 0 K/UL (ref 0–0.5)
EOSINOPHIL NFR BLD: 0.1 % (ref 0–8)
ERYTHROCYTE [DISTWIDTH] IN BLOOD BY AUTOMATED COUNT: 15.8 % (ref 11.5–14.5)
EST. GFR  (NO RACE VARIABLE): >60 ML/MIN/1.73 M^2
ESTIMATED AVG GLUCOSE: 94 MG/DL (ref 68–131)
FRACTIONAL SHORTENING: 29 % (ref 28–44)
GLUCOSE SERPL-MCNC: 121 MG/DL (ref 70–110)
GLUCOSE UR QL STRIP: NEGATIVE
HBA1C MFR BLD: 4.9 % (ref 4–5.6)
HCT VFR BLD AUTO: 30 % (ref 37–48.5)
HGB BLD-MCNC: 9.5 G/DL (ref 12–16)
HGB UR QL STRIP: ABNORMAL
IMM GRANULOCYTES # BLD AUTO: 0.04 K/UL (ref 0–0.04)
IMM GRANULOCYTES NFR BLD AUTO: 0.4 % (ref 0–0.5)
INTERVENTRICULAR SEPTUM: 1.19 CM (ref 0.6–1.1)
KETONES UR QL STRIP: NEGATIVE
LA MAJOR: 6.74 CM
LA MINOR: 6.53 CM
LA WIDTH: 4.25 CM
LEFT ATRIUM SIZE: 5.18 CM
LEFT ATRIUM VOLUME INDEX MOD: 51.8 ML/M2
LEFT ATRIUM VOLUME INDEX: 62.7 ML/M2
LEFT ATRIUM VOLUME MOD: 102.63 ML
LEFT ATRIUM VOLUME: 124.13 CM3
LEFT INTERNAL DIMENSION IN SYSTOLE: 2.98 CM (ref 2.1–4)
LEFT VENTRICLE DIASTOLIC VOLUME INDEX: 39.18 ML/M2
LEFT VENTRICLE DIASTOLIC VOLUME: 77.58 ML
LEFT VENTRICLE MASS INDEX: 78 G/M2
LEFT VENTRICLE SYSTOLIC VOLUME INDEX: 17.3 ML/M2
LEFT VENTRICLE SYSTOLIC VOLUME: 34.3 ML
LEFT VENTRICULAR INTERNAL DIMENSION IN DIASTOLE: 4.18 CM (ref 3.5–6)
LEFT VENTRICULAR MASS: 154.89 G
LEUKOCYTE ESTERASE UR QL STRIP: NEGATIVE
LV LATERAL E/E' RATIO: 14
LV SEPTAL E/E' RATIO: 22.4
LYMPHOCYTES # BLD AUTO: 0.9 K/UL (ref 1–4.8)
LYMPHOCYTES NFR BLD: 10.5 % (ref 18–48)
MAGNESIUM SERPL-MCNC: 1.6 MG/DL (ref 1.6–2.6)
MCH RBC QN AUTO: 29.2 PG (ref 27–31)
MCHC RBC AUTO-ENTMCNC: 31.7 G/DL (ref 32–36)
MCV RBC AUTO: 92 FL (ref 82–98)
MICROSCOPIC COMMENT: NORMAL
MONOCYTES # BLD AUTO: 0.9 K/UL (ref 0.3–1)
MONOCYTES NFR BLD: 9.7 % (ref 4–15)
MV PEAK A VEL: 0.47 M/S
MV PEAK E VEL: 1.12 M/S
MV PEAK GRADIENT: 3 MMHG
NEUTROPHILS # BLD AUTO: 7 K/UL (ref 1.8–7.7)
NEUTROPHILS NFR BLD: 78.7 % (ref 38–73)
NITRITE UR QL STRIP: NEGATIVE
NRBC BLD-RTO: 0 /100 WBC
OHS QRS DURATION: 94 MS
OHS QTC CALCULATION: 450 MS
PH UR STRIP: 7 [PH] (ref 5–8)
PISA TR MAX VEL: 2.86 M/S
PLATELET # BLD AUTO: 241 K/UL (ref 150–450)
PMV BLD AUTO: 10.3 FL (ref 9.2–12.9)
POCT GLUCOSE: 110 MG/DL (ref 70–110)
POCT GLUCOSE: 118 MG/DL (ref 70–110)
POCT GLUCOSE: 134 MG/DL (ref 70–110)
POCT GLUCOSE: 143 MG/DL (ref 70–110)
POTASSIUM SERPL-SCNC: 4 MMOL/L (ref 3.5–5.1)
PROT UR QL STRIP: ABNORMAL
RA MAJOR: 5.84 CM
RA PRESSURE ESTIMATED: 15 MMHG
RA WIDTH: 3.07 CM
RBC # BLD AUTO: 3.25 M/UL (ref 4–5.4)
RBC #/AREA URNS AUTO: 4 /HPF (ref 0–4)
RIGHT VENTRICLE DIASTOLIC BASEL DIMENSION: 3.6 CM
RV TB RVSP: 18 MMHG
SINUS: 2.51 CM
SODIUM SERPL-SCNC: 133 MMOL/L (ref 136–145)
SP GR UR STRIP: 1.01 (ref 1–1.03)
SQUAMOUS #/AREA URNS AUTO: 2 /HPF
STJ: 2.39 CM
TDI LATERAL: 0.08 M/S
TDI SEPTAL: 0.05 M/S
TDI: 0.07 M/S
TR MAX PG: 33 MMHG
TRICUSPID ANNULAR PLANE SYSTOLIC EXCURSION: 1.59 CM
TV PEAK GRADIENT: 33 MMHG
TV REST PULMONARY ARTERY PRESSURE: 48 MMHG
UNSPECIFIED CRY UR QL COMP ASSIST: <1
URN SPEC COLLECT METH UR: ABNORMAL
WBC # BLD AUTO: 8.95 K/UL (ref 3.9–12.7)
WBC #/AREA URNS AUTO: 4 /HPF (ref 0–5)
Z-SCORE OF LEFT VENTRICULAR DIMENSION IN END DIASTOLE: -3.13
Z-SCORE OF LEFT VENTRICULAR DIMENSION IN END SYSTOLE: -1.3

## 2024-09-25 PROCEDURE — 80048 BASIC METABOLIC PNL TOTAL CA: CPT | Performed by: PHYSICIAN ASSISTANT

## 2024-09-25 PROCEDURE — 25500020 PHARM REV CODE 255: Performed by: INTERNAL MEDICINE

## 2024-09-25 PROCEDURE — 83735 ASSAY OF MAGNESIUM: CPT | Performed by: PHYSICIAN ASSISTANT

## 2024-09-25 PROCEDURE — 20600001 HC STEP DOWN PRIVATE ROOM

## 2024-09-25 PROCEDURE — 25000003 PHARM REV CODE 250: Performed by: PHYSICIAN ASSISTANT

## 2024-09-25 PROCEDURE — 63600175 PHARM REV CODE 636 W HCPCS: Performed by: PHYSICIAN ASSISTANT

## 2024-09-25 PROCEDURE — 85025 COMPLETE CBC W/AUTO DIFF WBC: CPT | Performed by: PHYSICIAN ASSISTANT

## 2024-09-25 PROCEDURE — 83036 HEMOGLOBIN GLYCOSYLATED A1C: CPT | Performed by: PHYSICIAN ASSISTANT

## 2024-09-25 RX ORDER — LORAZEPAM 0.5 MG/1
0.5 TABLET ORAL EVERY 8 HOURS PRN
Status: DISCONTINUED | OUTPATIENT
Start: 2024-09-25 | End: 2024-09-28 | Stop reason: HOSPADM

## 2024-09-25 RX ORDER — FUROSEMIDE 10 MG/ML
40 INJECTION INTRAMUSCULAR; INTRAVENOUS EVERY 12 HOURS
Status: DISCONTINUED | OUTPATIENT
Start: 2024-09-25 | End: 2024-09-26

## 2024-09-25 RX ADMIN — SPIRONOLACTONE 25 MG: 25 TABLET ORAL at 08:09

## 2024-09-25 RX ADMIN — FUROSEMIDE 40 MG: 10 INJECTION, SOLUTION INTRAVENOUS at 08:09

## 2024-09-25 RX ADMIN — PANTOPRAZOLE SODIUM 40 MG: 40 TABLET, DELAYED RELEASE ORAL at 08:09

## 2024-09-25 RX ADMIN — ISOSORBIDE MONONITRATE 60 MG: 60 TABLET, EXTENDED RELEASE ORAL at 08:09

## 2024-09-25 RX ADMIN — CARVEDILOL 6.25 MG: 6.25 TABLET, FILM COATED ORAL at 12:09

## 2024-09-25 RX ADMIN — DULOXETINE HYDROCHLORIDE 20 MG: 20 CAPSULE, DELAYED RELEASE ORAL at 08:09

## 2024-09-25 RX ADMIN — CARVEDILOL 6.25 MG: 6.25 TABLET, FILM COATED ORAL at 08:09

## 2024-09-25 RX ADMIN — HUMAN ALBUMIN MICROSPHERES AND PERFLUTREN 0.11 MG: 10; .22 INJECTION, SOLUTION INTRAVENOUS at 02:09

## 2024-09-25 RX ADMIN — GABAPENTIN 600 MG: 300 CAPSULE ORAL at 12:09

## 2024-09-25 RX ADMIN — RANOLAZINE 1000 MG: 500 TABLET, EXTENDED RELEASE ORAL at 08:09

## 2024-09-25 RX ADMIN — CARVEDILOL 6.25 MG: 6.25 TABLET, FILM COATED ORAL at 04:09

## 2024-09-25 RX ADMIN — GABAPENTIN 600 MG: 300 CAPSULE ORAL at 08:09

## 2024-09-25 RX ADMIN — RANOLAZINE 1000 MG: 500 TABLET, EXTENDED RELEASE ORAL at 12:09

## 2024-09-25 RX ADMIN — APIXABAN 5 MG: 5 TABLET, FILM COATED ORAL at 12:09

## 2024-09-25 RX ADMIN — GABAPENTIN 600 MG: 300 CAPSULE ORAL at 04:09

## 2024-09-25 RX ADMIN — LORAZEPAM 0.5 MG: 0.5 TABLET ORAL at 08:09

## 2024-09-25 RX ADMIN — CLOPIDOGREL BISULFATE 75 MG: 75 TABLET ORAL at 08:09

## 2024-09-25 RX ADMIN — MONTELUKAST 10 MG: 10 TABLET, FILM COATED ORAL at 08:09

## 2024-09-25 RX ADMIN — APIXABAN 5 MG: 5 TABLET, FILM COATED ORAL at 08:09

## 2024-09-25 RX ADMIN — LOSARTAN POTASSIUM 50 MG: 50 TABLET, FILM COATED ORAL at 08:09

## 2024-09-25 RX ADMIN — ATORVASTATIN CALCIUM 10 MG: 10 TABLET, FILM COATED ORAL at 08:09

## 2024-09-25 NOTE — H&P
Ronald Heredia - Cardiology [facility] Medicine  History & Physical    Patient Name: Adriana Strong  MRN: [id number]  Patient Class: IP- Inpatient  Admission Date: 9/24/2024  Attending Physician: Gene Elizondo MD   Primary Care Provider: Krzysztof Mcgraw MD         Patient information was obtained from patient, relative(s), past medical records, and ER records.     Subjective:     Principal Problem:Acute on chronic combined systolic and diastolic congestive heart failure    Chief Complaint:   Chief Complaint   Patient presents with    Shortness of Breath     SENT FROM cards clinic fluid overloaded, on 2l nc        HPI: Adriana Strong is a 79 y.o. female with a PMHx of CHF (EF 35%), Afib on elqiuis, s/p pacemaker, HLD, HTN, non obstructive CAD, s/p TAVR, asthma, COPD, on home oxygen who presents to [facility] from cardiology clinic for CHF exacerbation. Daughter at bedside assists with history. Patient reports progressive worsening of shortness of breath and bilateral lower extremity swelling for the past few weeks. She has orthopnea and has to sleep upright on a wedge. She has ongoing issues with gait instability progressively worsening over the last year or so which seems to have worsen as walking is more difficult recently due to the shortness of breath. She had a fall a few weeks ago with head trauma and had a CTH done at that time which was unremarkable. No falls since. Patient's also had some mild memory issues of the last few months which daughter is trying to get into neurology to assess for possible early dementia. Denies fever, chills, chest pain, N/V, abdmonal pain, HA, vision changes or syncope. No increased O2 needs recently.     ED: hypertensive to /82, improved to SBP 150s s/p IV lasix. UA non infectious. , CXR shows mild congestive changes. Possible small left pleural effusion. Given lasix 60mg IVP.     Past Medical History:   Diagnosis Date    Acute on chronic diastolic (congestive)  heart failure 11/20/2019    Anticoagulant long-term use     on Plavix since May 2015    Anxiety     Arthritis     Asthma     Atrial fibrillation     Atrial fibrillation with RVR 10/19/2020    Cataracts, bilateral     Chest pain, atypical     Chronic atrial fibrillation with rapid ventricular response 06/23/2017    Colon polyp     Coronary artery disease     Diabetes mellitus     Dry eyes     Esophageal erosions     General anesthetics causing adverse effect in therapeutic use     GERD (gastroesophageal reflux disease)     Heart murmur     Hemorrhoid     High cholesterol     Hypertension     Irritable bowel syndrome     Paroxysmal atrial fibrillation 10/30/2020    Persistent atrial fibrillation 02/10/2020    Shingles 2015    Stenosis of aortic and mitral valves     Stroke     TIA (transient ischemic attack)     Use of cane as ambulatory aid        Past Surgical History:   Procedure Laterality Date    ABDOMINAL SURGERY      stents to SMA    angiocele      2007, 2014    AORTIC VALVE REPLACEMENT N/A 11/19/2019    Procedure: Replacement-valve-aortic;  Surgeon: Jack Capone MD;  Location: [facility] CATH LAB;  Service: Cardiology;  Laterality: N/A;    BREAST SURGERY      left--- a lump--- no cancer    CARDIAC VALVE SURGERY      COLONOSCOPY  2014    COLONOSCOPY N/A 3/14/2018    Procedure: COLONOSCOPY;  Surgeon: Efren Kemp MD;  Location: [facility] ENDO ([address] [place] [address]);  Service: Endoscopy;  Laterality: N/A;  ok to hold Plavix 5 days prior to procedure per Dr RESHMA Hernandez     ok per Dr Kemp to hold Eliquis 2 days prior to procedure (see telephone encounter dated-2/7/18)    CORONARY ANGIOGRAPHY N/A 2/15/2024    Procedure: ANGIOGRAM, CORONARY ARTERY;  Surgeon: Jos Baptiste MD;  Location: [facility] CATH LAB;  Service: Cardiology;  Laterality: N/A;    HERNIA REPAIR      HYSTERECTOMY  partial    1982 partial hysterectomy    IMPLANTATION OF BIVENTRICULAR PERMANENT PACEMAKER AS UPGRADE TO EXISTING PACEMAKER Left 5/29/2024    Procedure:  Biventricular pacemaker upgrade;  Surgeon: Chencho Moeller MD;  Location: [facility] EP LAB;  Service: Cardiology;  Laterality: Left;  CHF, AF,  CRTP ugrd, BIO, MAC, AZ, 3prep ** BIO dcPPM in situ/ Contrast Prep**    LEFT HEART CATHETERIZATION Right 11/5/2019    Procedure: Left heart cath;  Surgeon: Jack Capone MD;  Location: [facility] CATH LAB;  Service: Cardiology;  Laterality: Right;    left nasal polyp      left neck lymph node      nose polyp      PHLEBOGRAPHY Left 5/1/2024    Procedure: Venogram;  Surgeon: Chencho Moeller MD;  Location: [facility] EP LAB;  Service: Cardiology;  Laterality: Left;  HF, Venogram ( Lt arm) , AZ, 3 prep ** BIO dcPPM in situ/ Contrast Prep**    right hip fatty mass tissue      stent to small intestine      SUPERIOR VENA CAVA ANGIOPLASTY / STENTING      TONSILLECTOMY      TOTAL ABDOMINAL HYSTERECTOMY      2014    TOTAL KNEE ARTHROPLASTY Bilateral     TREATMENT OF CARDIAC ARRHYTHMIA N/A 2/10/2020    Procedure: CARDIOVERSION;  Surgeon: Jayy Parrish MD;  Location: [facility] EP LAB;  Service: Cardiology;  Laterality: N/A;  AF, PEDRO, DCCV, MAC, DM, 3 Prep    TREATMENT OF CARDIAC ARRHYTHMIA N/A 5/15/2020    Procedure: CARDIOVERSION;  Surgeon: Hany Bee MD;  Location: [facility] EP LAB;  Service: Cardiology;  Laterality: N/A;  AF, PEDRO, DCCV, MAC, DM, 3 Prep    UPPER GASTROINTESTINAL ENDOSCOPY  2014       Review of patient's allergies indicates:   Allergen Reactions    Celebrex [celecoxib] Shortness Of Breath    Ciprofloxacin Swelling     lip    Dicyclomine Hives    Adhesive Dermatitis    Avelox [moxifloxacin] Swelling    Cilostazol Other (See Comments)     Elevates blood pressure    Eryc [erythromycin] Other (See Comments)     unknown    Iodine and iodide containing products Hives    Keflex [cephalexin] Hives    Meclomen      rashes    Meloxicam      Ear ringing    Metoclopramide Other (See Comments)     High blood pressure    Sulfa (sulfonamide antibiotics) Itching       Current Facility-Administered  Medications on File Prior to Encounter   Medication    0.9%  NaCl infusion    sodium chloride 0.9% flush 5 mL    vancomycin (VANCOCIN) 1,000 mg in dextrose 5 % (D5W) 250 mL IVPB (Vial-Mate)     Current Outpatient Medications on File Prior to Encounter   Medication Sig    acetaminophen (TYLENOL) 650 MG TbSR Take 1,300 mg by mouth 2 (two) times daily as needed.    albuterol-ipratropium (DUO-NEB) 2.5 mg-0.5 mg/3 mL nebulizer solution Take 3 mLs by nebulization every 4 (four) hours as needed for Wheezing or Shortness of Breath.    apixaban (ELIQUIS) 5 mg Tab Take 1 tablet (5 mg total) by mouth 2 (two) times daily.    ascorbic acid, vitamin C, (VITAMIN C) 500 MG tablet Take 1,000 mg by mouth once daily.     atorvastatin (LIPITOR) 10 MG tablet Take 1 tablet (10 mg total) by mouth once daily.    calcium carbonate/vitamin D3 (CALTRATE 600 + D ORAL) Take 1 tablet by mouth once daily.    carvediloL (COREG) 6.25 MG tablet Take 1 tablet (6.25 mg total) by mouth 2 (two) times daily with meals.    cimetidine (TAGAMET) 300 MG tablet Take 300 mg  by mouth  13 hours prior to your procedure (at 1 am) ;  then again 7 hours prior ( at 7 am)  , and again ( at 12 pm ) upon arrival to the hospital. (Patient not taking: Reported on 9/24/2024)    clopidogreL (PLAVIX) 75 mg tablet Take 1 tablet (75 mg total) by mouth once daily.    diphenhydrAMINE (BENADRYL) 25 mg capsule Take 50 mg ( 2 tablets ) by mouth 13 hours prior to your procedure (at 1 am) ;  then again 7 hours prior ( at 7 am)  , and again  ( at 12 pm ) upon arrival to the hospital. (Patient not taking: Reported on 9/24/2024)    DULoxetine (CYMBALTA) 20 MG capsule Take 1 capsule (20 mg total) by mouth once daily.    empagliflozin (JARDIANCE) 10 mg tablet Take 1 tablet (10 mg total) by mouth once daily. (Patient not taking: Reported on 9/24/2024)    estradioL (ESTRACE) 0.01 % (0.1 mg/gram) vaginal cream Place vaginally.    fexofenadine (ALLEGRA) 60 MG tablet Take 60 mg by mouth once  daily.    fluconazole (DIFLUCAN) 150 MG Tab Take 150 mg by mouth.    furosemide (LASIX) 40 MG tablet Take 1 tablet (40 mg total) by mouth 2 (two) times a day.    gabapentin (NEURONTIN) 600 MG tablet Take 600 mg by mouth 3 (three) times daily.    HYDROcodone-acetaminophen (NORCO) 5-325 mg per tablet Take 1 tablet by mouth every 4 (four) hours as needed for Pain. (Patient not taking: Reported on 9/24/2024)    isosorbide mononitrate (IMDUR) 60 MG 24 hr tablet Take 1 tablet (60 mg total) by mouth once daily.    LORazepam (ATIVAN) 0.5 MG tablet Take 1 tablet (0.5 mg total) by mouth every morning.    losartan (COZAAR) 50 MG Tab Take 1 tablet (50 mg total) by mouth once daily.    metFORMIN (GLUCOPHAGE) 500 MG tablet Take 1 tablet (500 mg total) by mouth 2 (two) times daily.    montelukast (SINGULAIR) 10 mg tablet Take 10 mg by mouth once daily.    mupirocin (BACTROBAN) 2 % ointment Apply topically every other day.    mv-mn/folic ac/calcium/vit K1 (WOMEN'S 50 PLUS MULTIVITAMIN ORAL) Take 1 tablet by mouth once daily.    nitroGLYCERIN (NITROSTAT) 0.4 MG SL tablet Place 1 tablet (0.4 mg total) under the tongue every 5 (five) minutes as needed for Chest pain.    pantoprazole (PROTONIX) 40 MG tablet Take 1 tablet (40 mg total) by mouth once daily.    predniSONE (DELTASONE) 50 MG Tab Take 50 mg ( 1 tablet ) by mouth 13 hours prior to your procedure (at 1 am)  ;  then again 7 hours prior ( at 7 am)  , and again  ( at 12 pm ) upon arrival to the hospital. (Patient not taking: Reported on 9/24/2024)    ranolazine (RANEXA) 1,000 mg Tb12 Take 1 tablet (1,000 mg total) by mouth 2 (two) times daily.    spironolactone (ALDACTONE) 25 MG tablet Take 1 tablet (25 mg total) by mouth once daily.    SYMBICORT 160-4.5 mcg/actuation HFAA Inhale 1 puff into the lungs 2 (two) times daily.     Family History       Problem Relation (Age of Onset)    Heart attack Mother    Heart failure Brother    Stroke Father          Tobacco Use    Smoking  status: Never    Smokeless tobacco: Never   Substance and Sexual Activity    Alcohol use: No    Drug use: Never    Sexual activity: Not Currently     Partners: Male     Review of Systems   Constitutional:  Positive for activity change. Negative for chills and fever.   Respiratory:  Positive for shortness of breath. Negative for cough, chest tightness and wheezing.    Cardiovascular:  Positive for leg swelling. Negative for chest pain and palpitations.   Gastrointestinal:  Negative for abdominal distention, abdominal pain, diarrhea, nausea and vomiting.   Genitourinary:  Negative for dysuria, flank pain and urgency.   Musculoskeletal:  Negative for arthralgias, back pain and gait problem.   Neurological:  Negative for dizziness and weakness.   Psychiatric/Behavioral:  Positive for confusion (mild memory issues x1 year). Negative for decreased concentration.      Objective:     Vital Signs (Most Recent):  Temp: 98.3 °F (36.8 °C) (09/25/24 0011)  Pulse: 60 (09/25/24 0011)  Resp: 20 (09/25/24 0011)  BP: (!) 198/89 (09/25/24 0011)  SpO2: 96 % (09/25/24 0011) Vital Signs (24h Range):  Temp:  [97.6 °F (36.4 °C)-98.3 °F (36.8 °C)] 98.3 °F (36.8 °C)  Pulse:  [60] 60  Resp:  [18-25] 20  SpO2:  [96 %-100 %] 96 %  BP: (137-199)/(62-89) 198/89     Weight: 94.9 kg (209 lb 3.5 oz)  Body mass index is 35.91 kg/m².     Physical Exam  Vitals and nursing note reviewed.   Constitutional:       General: She is not in acute distress.     Appearance: She is well-developed.   HENT:      Head: Normocephalic and atraumatic.      Mouth/Throat:      Pharynx: No oropharyngeal exudate.   Eyes:      General: No scleral icterus.     Conjunctiva/sclera: Conjunctivae normal.   Cardiovascular:      Rate and Rhythm: Normal rate and regular rhythm.      Heart sounds: Normal heart sounds.   Pulmonary:      Effort: Pulmonary effort is normal. No respiratory distress.      Breath sounds: Rales present. No wheezing.      Comments: Crackles to mid- lower  lungs  Abdominal:      General: Bowel sounds are normal. There is no distension.      Palpations: Abdomen is soft.      Tenderness: There is no abdominal tenderness.   Musculoskeletal:         General: No tenderness. Normal range of motion.      Cervical back: Normal range of motion and neck supple.      Right lower leg: Edema present.      Left lower leg: Edema present.   Lymphadenopathy:      Cervical: No cervical adenopathy.   Skin:     General: Skin is warm and dry.      Capillary Refill: Capillary refill takes less than 2 seconds.      Findings: Bruising (R wrist, improving per daughter) present. No rash.   Neurological:      Mental Status: She is alert and oriented to person, place, and time.      Cranial Nerves: No cranial nerve deficit.      Sensory: No sensory deficit.      Coordination: Coordination normal.   Psychiatric:         Behavior: Behavior normal.         Thought Content: Thought content normal.         Judgment: Judgment normal.                Significant Labs: All pertinent labs within the past 24 hours have been reviewed.  CBC:   Recent Labs   Lab 09/24/24  1856   WBC 5.89   HGB 10.6*   HCT 34.3*        CMP:   Recent Labs   Lab 09/24/24  1856   *   K 4.9   CL 96   CO2 28   GLU 96   BUN 22   CREATININE 0.8   CALCIUM 9.3   PROT 6.9   ALBUMIN 3.7   BILITOT 0.5   ALKPHOS 36*   AST 17   ALT 11   ANIONGAP 9       Significant Imaging: I have reviewed all pertinent imaging results/findings within the past 24 hours.  X-Ray Chest AP Portable  Narrative: EXAMINATION:  XR CHEST AP PORTABLE    CLINICAL HISTORY:  CHF;    TECHNIQUE:  Single frontal view of the chest was performed.    COMPARISON:  05/29/2024 chest x-ray    FINDINGS:  The cardiac silhouette is stable and borderline size.  Multi lead pacemaker is stable.  There is mild opacification left lung base and perihilar opacities raise question of mild CHF.  Costophrenic angle blunting on the left raise question of a small pleural  effusion.  Impression: Mild congestive changes.  Possible small left pleural effusion.    Electronically signed by: Sade Phipps  Date:    09/24/2024  Time:    23:56      Assessment/Plan:     * Acute on chronic combined systolic and diastolic congestive heart failure  - appears volume overload on exam  - , CXR w/ pulm edema  - start IV lasix 40mg BID  - repeat echo  - continue GDMT  - goal K>4, Mg>2  - monitor tele  - strict I/Os, daily weights    Chronic respiratory failure with hypoxia  - stable on home 2L NC    History of transcatheter aortic valve replacement (TAVR)  - noted hx  - follow up echo    COPD (chronic obstructive pulmonary disease)  - no acute issues  - duonebs prn     Permanent atrial fibrillation  - continue coreg, eliquis     Essential hypertension  Chronic, uncontrolled. Latest blood pressure and vitals reviewed-     Temp:  [97.6 °F (36.4 °C)-98.3 °F (36.8 °C)]   Pulse:  [60]   Resp:  [18-25]   BP: (137-199)/(62-89)   SpO2:  [96 %-100 %] .   Home meds for hypertension were reviewed and noted below.   Hypertension Medications               carvediloL (COREG) 6.25 MG tablet Take 1 tablet (6.25 mg total) by mouth 2 (two) times daily with meals.    furosemide (LASIX) 40 MG tablet Take 1 tablet (40 mg total) by mouth 2 (two) times a day.    isosorbide mononitrate (IMDUR) 60 MG 24 hr tablet Take 1 tablet (60 mg total) by mouth once daily.    losartan (COZAAR) 50 MG Tab Take 1 tablet (50 mg total) by mouth once daily.    nitroGLYCERIN (NITROSTAT) 0.4 MG SL tablet Place 1 tablet (0.4 mg total) under the tongue every 5 (five) minutes as needed for Chest pain.    spironolactone (ALDACTONE) 25 MG tablet Take 1 tablet (25 mg total) by mouth once daily.            While in the hospital, will manage blood pressure as follows; Continue home antihypertensive regimen    Will utilize p.r.n. blood pressure medication only if patient's blood pressure greater than 180/110 and she develops symptoms such as  worsening chest pain or shortness of breath.    Type 2 diabetes mellitus  - hold home metformin  - LDSSI, ACHS accuchecks  Lab Results   Component Value Date    HGBA1C 5.0 02/09/2024         VTE Risk Mitigation (From admission, onward)           Ordered     apixaban tablet 5 mg  2 times daily         09/24/24 2210     IP VTE HIGH RISK PATIENT  Once         09/24/24 2208     Place sequential compression device  Until discontinued         09/24/24 2208                                    Reshma Santos PA-C  Department of Hospital Medicine  [facility] - Cardiology Stepdown

## 2024-09-25 NOTE — PLAN OF CARE
Ronald Heredia - Cardiology Stepdown  Initial Discharge Assessment       Primary Care Provider: Krzysztof Mcgraw MD    Admission Diagnosis: Shortness of breath [R06.02]  Multiple complaints [R68.89]  Chest pain [R07.9]  Acute on chronic congestive heart failure, unspecified heart failure type [I50.9]    Admission Date: 9/24/2024  Expected Discharge Date: 9/27/2024         [facility] / Plan: [facility] CHOICES / Product Type: Medicare Advantage /     Extended Emergency Contact Information  Primary Emergency Contact: Anthony Strong  Address: [address] [place] [address] [facility]  Home Phone: [phone]  Mobile Phone: [phone]  Relation: Spouse  Secondary Emergency Contact: Tammy Kemp  Address: [address] [address] [place]  Mobile Phone: [phone]  Relation: Daughter    Discharge Plan A: Home, Home with family  Discharge Plan B: Home, Home with family, Home Health, Skilled Nursing Facility (TBD)      [id number] - [place], [place] - [address] [facility] AT CORNER OF [address] [address]  Phone: [phone] Fax: [phone]      Initial Assessment (most recent)       Adult Discharge Assessment - 09/25/24 1651          Discharge Assessment    Assessment Type Discharge Planning Assessment     Confirmed/corrected address, phone number and insurance Yes     Confirmed Demographics Correct on Facesheet     Source of Information patient   Pt - Anthony at bedside    Does patient/caregiver understand observation status Yes     Communicated MIMI with patient/caregiver Date not available/Unable to determine     Reason For Admission Acute on chronic combined systolic and diastolic congestive heart failure     People in Home spouse     Facility Arrived From: Home     Do you expect to return to your current living situation? Yes     Do you have help at home or someone to help you  manage your care at home? Yes     Who are your caregiver(s) and their phone number(s)? - Anthony [phone]     Prior to hospitilization cognitive status: Alert/Oriented     Current cognitive status: Alert/Oriented     Walking or Climbing Stairs Difficulty yes     Walking or Climbing Stairs ambulation difficulty, requires equipment;ambulation difficulty, assistance 1 person;ambulation difficulty, dependent     Mobility Management Pt has rolling walker, wheelchair, standard and quad cane     Dressing/Bathing Difficulty yes     Dressing/Bathing bathing difficulty, requires equipment;bathing difficulty, assistance 1 person     Dressing/Bathing Management Per pt has a bedside commode     Home Layout Able to live on 1st floor     Equipment Currently Used at Home walker, rolling;wheelchair;cane, quad;cane, straight;bedside commode     Readmission within 30 days? No     Patient currently being followed by outpatient case management? No     Do you currently have service(s) that help you manage your care at home? Yes     Name and Contact number of agency Pt with [facility] [phone]     Is the pt/caregiver preference to resume services with current agency Yes     Do you take prescription medications? Yes     Do you have prescription coverage? Yes     Coverage [employer] [facility] [facility] [facility] CHOICES     Do you have any problems affording any of your prescribed medications? No     Is the patient taking medications as prescribed? yes     Who is going to help you get home at discharge? -Anthony     How do you get to doctors appointments? family or friend will provide     Are you on dialysis? No     Do you take coumadin? No     Discharge Plan A Home;Home with family     Discharge Plan B Home;Home with family;Home Health;Skilled Nursing Facility   TBD    DME Needed Upon Discharge  --   TBD    Discharge Plan discussed with: Spouse/sig other;Patient        Physical Activity    On average, how many  days per week do you engage in moderate to strenuous exercise (like a brisk walk)? 7 days   Per pt using HME    On average, how many minutes do you engage in exercise at this level? 10 min        Financial Resource Strain    How hard is it for you to pay for the very basics like food, housing, medical care, and heating? Not hard at all        Housing Stability    In the last 12 months, was there a time when you were not able to pay the mortgage or rent on time? No     At any time in the past 12 months, were you homeless or living in a shelter (including now)? No        Transportation Needs    Has the lack of transportation kept you from medical appointments, meetings, work or from getting things needed for daily living? No        Food Insecurity    Within the past 12 months, you worried that your food would run out before you got the money to buy more. Never true     Within the past 12 months, the food you bought just didn't last and you didn't have money to get more. Never true        Stress    Do you feel stress - tense, restless, nervous, or anxious, or unable to sleep at night because your mind is troubled all the time - these days? Only a little        Social Isolation    How often do you feel lonely or isolated from those around you?  Rarely        Alcohol Use    Q1: How often do you have a drink containing alcohol? Never     Q2: How many drinks containing alcohol do you have on a typical day when you are drinking? Patient does not drink     Q3: How often do you have six or more drinks on one occasion? Never        Utilities    In the past 12 months has the electric, gas, oil, or water company threatened to shut off services in your home? No        Health Literacy    How often do you need to have someone help you when you read instructions, pamphlets, or other written material from your doctor or pharmacy? Patient unable to respond                 MISSED in Error; pt lives in a 2story home and bedroom is on  the first level and on 2L home O2.     SW met with patient and pt -Anthony  in room to complete DPA. Questions answered / contact numbers provided.  Use PREFERRED PHARMACY / BEDSIDE DELIVERY for any necessary medications at time of discharge. Pt lives in a 2 story home w/ her  and bedroom is on the first level,  is her support. Pt is dependent with some ADLs - does use DME, In-home equipment (rolling walker, wheelchair, 1 quad and 1 standard cane) is not on HD. Pt is on 2 BT and on 2L home oxygen. Pt  will be assisting with help upon discharge. Pt  also will be providing transportation home. Hospital follow up will be scheduled with PCP. Will continue to follow for course of hospitalization. SW verified PCP and insurance and provided pt d/c booklet.      Discharge Plan A and Plan B have been determined by review of patient's clinical status, future medical and therapeutic needs, and coverage/benefits for post-acute care in coordination with multidisciplinary team members.    CLOVER Zavala   [facility]    Case Management Dept  [phone]

## 2024-09-25 NOTE — CONSULTS
Food & Nutrition  Education     Diet Education: Fluid and Salt restriction   Time Spent: 10 minutes   Learners:      Handouts provided: Low Sodium Nutrition Therapy     Comments: Discussed importance of a low sodium diet. Reviewed high sodium foods that should be avoided. Food labels, salt free seasonings, and recommended sodium intake reviewed. Encouraged healthy, fresh foods that are low in sodium that are good for consumption. Fluid intake and conversions discussed. Foods considered fluids were reviewed and encouraged to monitor. General healthy diet encouraged. All questions/concerns were addressed.       Please Re-consult as needed.   Thanks!     Sayra Kwan RD, LDN

## 2024-09-25 NOTE — ED NOTES
Nurses Note -- 4 Eyes      9/24/2024   10:31 PM      Skin assessed during: Admit      [] No Altered Skin Integrity Present    []Prevention Measures Documented      [x] Yes- Altered Skin Integrity Present or Discovered   [x] LDA Added if Not in Epic (Describe Wound)   [x] New Altered Skin Integrity was Present on Admit and Documented in LDA   [x] Wound Image Taken    Wound Care Consulted? Yes    Attending Nurse:  Raquel Pandya RN     Second RN/Staff Member:  marco

## 2024-09-25 NOTE — ASSESSMENT & PLAN NOTE
Chronic, uncontrolled. Latest blood pressure and vitals reviewed-     Temp:  [97.6 °F (36.4 °C)-98.3 °F (36.8 °C)]   Pulse:  [60]   Resp:  [18-25]   BP: (137-199)/(62-89)   SpO2:  [96 %-100 %] .   Home meds for hypertension were reviewed and noted below.   Hypertension Medications               carvediloL (COREG) 6.25 MG tablet Take 1 tablet (6.25 mg total) by mouth 2 (two) times daily with meals.    furosemide (LASIX) 40 MG tablet Take 1 tablet (40 mg total) by mouth 2 (two) times a day.    isosorbide mononitrate (IMDUR) 60 MG 24 hr tablet Take 1 tablet (60 mg total) by mouth once daily.    losartan (COZAAR) 50 MG Tab Take 1 tablet (50 mg total) by mouth once daily.    nitroGLYCERIN (NITROSTAT) 0.4 MG SL tablet Place 1 tablet (0.4 mg total) under the tongue every 5 (five) minutes as needed for Chest pain.    spironolactone (ALDACTONE) 25 MG tablet Take 1 tablet (25 mg total) by mouth once daily.            While in the hospital, will manage blood pressure as follows; Continue home antihypertensive regimen    Will utilize p.r.n. blood pressure medication only if patient's blood pressure greater than 180/110 and she develops symptoms such as worsening chest pain or shortness of breath.

## 2024-09-25 NOTE — HPI
Adriana Strong is a 79 y.o. female with a PMHx of CHF (EF 35%), Afib on elqiuis, s/p pacemaker, HLD, HTN, non obstructive CAD, s/p TAVR, asthma, COPD, on home oxygen who presents to [facility] [place] from cardiology clinic for CHF exacerbation. Daughter at bedside assists with history. Patient reports progressive worsening of shortness of breath and bilateral lower extremity swelling for the past few weeks. She has orthopnea and has to sleep upright on a wedge. She has ongoing issues with gait instability progressively worsening over the last year or so which seems to have worsen as walking is more difficult recently due to the shortness of breath. She had a fall a few weeks ago with head trauma and had a CTH done at that time which was unremarkable. No falls since. Patient's also had some mild memory issues of the last few months which daughter is trying to get into neurology to assess for possible early dementia. Denies fever, chills, chest pain, N/V, abdmonal pain, HA, vision changes or syncope. No increased O2 needs recently.     ED: hypertensive to /82, improved to SBP 150s s/p IV lasix. UA non infectious. , CXR shows mild congestive changes. Possible small left pleural effusion. Given lasix 60mg IVP.

## 2024-09-25 NOTE — ASSESSMENT & PLAN NOTE
- hold home metformin  - CAROL ANN SANTIZO accuchecks  Lab Results   Component Value Date    HGBA1C 5.0 02/09/2024

## 2024-09-25 NOTE — NURSING
Nurses Note -- 4 Eyes      9/25/2024   12:52 AM      Skin assessed during: Admit      [] No Altered Skin Integrity Present    []Prevention Measures Documented      [x] Yes- Altered Skin Integrity Present or Discovered   [x] LDA Added if Not in Epic (Describe Wound)   [x] New Altered Skin Integrity was Present on Admit and Documented in LDA   [x] Wound Image Taken    Wound Care Consulted? Yes    Attending Nurse:  Satnam Gutiérrez RN/Staff Member:   Kirill

## 2024-09-25 NOTE — ASSESSMENT & PLAN NOTE
- appears volume overload on exam  - , CXR w/ pulm edema  - start IV lasix 40mg BID  - repeat echo  - continue GDMT  - goal K>4, Mg>2  - monitor tele  - strict I/Os, daily weights

## 2024-09-25 NOTE — SUBJECTIVE & OBJECTIVE
Past Medical History:   Diagnosis Date    Acute on chronic diastolic (congestive) heart failure 11/20/2019    Anticoagulant long-term use     on Plavix since May 2015    Anxiety     Arthritis     Asthma     Atrial fibrillation     Atrial fibrillation with RVR 10/19/2020    Cataracts, bilateral     Chest pain, atypical     Chronic atrial fibrillation with rapid ventricular response 06/23/2017    Colon polyp     Coronary artery disease     Diabetes mellitus     Dry eyes     Esophageal erosions     General anesthetics causing adverse effect in therapeutic use     GERD (gastroesophageal reflux disease)     Heart murmur     Hemorrhoid     High cholesterol     Hypertension     Irritable bowel syndrome     Paroxysmal atrial fibrillation 10/30/2020    Persistent atrial fibrillation 02/10/2020    Shingles 2015    Stenosis of aortic and mitral valves     Stroke     TIA (transient ischemic attack)     Use of cane as ambulatory aid        Past Surgical History:   Procedure Laterality Date    ABDOMINAL SURGERY      stents to SMA    angiocele      2007, 2014    AORTIC VALVE REPLACEMENT N/A 11/19/2019    Procedure: Replacement-valve-aortic;  Surgeon: Jack Capone MD;  [place] [facility] [facility];  Service: Cardiology;  Laterality: N/A;    BREAST SURGERY      left--- a lump--- no cancer    CARDIAC VALVE SURGERY      COLONOSCOPY  2014    COLONOSCOPY N/A 3/14/2018    Procedure: COLONOSCOPY;  Surgeon: Efren Kemp MD;  Location: [facility] ENDO (4TH FLR);  Service: Endoscopy;  Laterality: N/A;  ok to hold Plavix 5 days prior to procedure per Dr RESHMA Hernandez     ok per Dr Kemp to hold Eliquis 2 days prior to procedure (see telephone encounter dated-2/7/18)    CORONARY ANGIOGRAPHY N/A 2/15/2024    Procedure: ANGIOGRAM, CORONARY ARTERY;  Surgeon: Jos Baptiste MD;  Location: [facility] [facility];  Service: Cardiology;  Laterality: N/A;    HERNIA REPAIR      HYSTERECTOMY  partial    1982 partial hysterectomy    IMPLANTATION OF BIVENTRICULAR PERMANENT  PACEMAKER AS UPGRADE TO EXISTING PACEMAKER Left 5/29/2024    Procedure: Biventricular pacemaker upgrade;  Surgeon: Chencho Moeller MD;  Location: [facility] EP LAB;  Service: Cardiology;  Laterality: Left;  CHF, AF,  CRTP ugrd, BIO, MAC, OH, 3prep ** BIO dcPPM in situ/ Contrast Prep**    LEFT HEART CATHETERIZATION Right 11/5/2019    Procedure: Left heart cath;  Surgeon: Jack Capone MD;  Location: [facility] CATH LAB;  Service: Cardiology;  Laterality: Right;    left nasal polyp      left neck lymph node      nose polyp      PHLEBOGRAPHY Left 5/1/2024    Procedure: Venogram;  Surgeon: Chencho Moeller MD;  Location: [facility] EP LAB;  Service: Cardiology;  Laterality: Left;  HF, Venogram ( Lt arm) , OH, 3 prep ** BIO dcPPM in situ/ Contrast Prep**    right hip fatty mass tissue      stent to small intestine      SUPERIOR VENA CAVA ANGIOPLASTY / STENTING      TONSILLECTOMY      TOTAL ABDOMINAL HYSTERECTOMY      2014    TOTAL KNEE ARTHROPLASTY Bilateral     TREATMENT OF CARDIAC ARRHYTHMIA N/A 2/10/2020    Procedure: CARDIOVERSION;  Surgeon: Jayy Parrish MD;  Location: [facility] EP LAB;  Service: Cardiology;  Laterality: N/A;  AF, PEDRO, DCCV, MAC, DM, 3 Prep    TREATMENT OF CARDIAC ARRHYTHMIA N/A 5/15/2020    Procedure: CARDIOVERSION;  Surgeon: Hany Bee MD;  Location: [facility] EP LAB;  Service: Cardiology;  Laterality: N/A;  AF, PEDRO, DCCV, MAC, DM, 3 Prep    UPPER GASTROINTESTINAL ENDOSCOPY  2014       Review of patient's allergies indicates:   Allergen Reactions    Celebrex [celecoxib] Shortness Of Breath    Ciprofloxacin Swelling     lip    Dicyclomine Hives    Adhesive Dermatitis    Avelox [moxifloxacin] Swelling    Cilostazol Other (See Comments)     Elevates blood pressure    Eryc [erythromycin] Other (See Comments)     unknown    Iodine and iodide containing products Hives    Keflex [cephalexin] Hives    Meclomen      rashes    Meloxicam      Ear ringing    Metoclopramide Other (See Comments)     High blood pressure    Sulfa  (sulfonamide antibiotics) Itching       Current Facility-Administered Medications on File Prior to Encounter   Medication    0.9%  NaCl infusion    sodium chloride 0.9% flush 5 mL    vancomycin (VANCOCIN) 1,000 mg in dextrose 5 % (D5W) 250 mL IVPB (Vial-Mate)     Current Outpatient Medications on File Prior to Encounter   Medication Sig    acetaminophen (TYLENOL) 650 MG TbSR Take 1,300 mg by mouth 2 (two) times daily as needed.    albuterol-ipratropium (DUO-NEB) 2.5 mg-0.5 mg/3 mL nebulizer solution Take 3 mLs by nebulization every 4 (four) hours as needed for Wheezing or Shortness of Breath.    apixaban (ELIQUIS) 5 mg Tab Take 1 tablet (5 mg total) by mouth 2 (two) times daily.    ascorbic acid, vitamin C, (VITAMIN C) 500 MG tablet Take 1,000 mg by mouth once daily.     atorvastatin (LIPITOR) 10 MG tablet Take 1 tablet (10 mg total) by mouth once daily.    calcium carbonate/vitamin D3 (CALTRATE 600 + D ORAL) Take 1 tablet by mouth once daily.    carvediloL (COREG) 6.25 MG tablet Take 1 tablet (6.25 mg total) by mouth 2 (two) times daily with meals.    cimetidine (TAGAMET) 300 MG tablet Take 300 mg  by mouth  13 hours prior to your procedure (at 1 am) ;  then again 7 hours prior ( at 7 am)  , and again ( at 12 pm ) upon arrival to the hospital. (Patient not taking: Reported on 9/24/2024)    clopidogreL (PLAVIX) 75 mg tablet Take 1 tablet (75 mg total) by mouth once daily.    diphenhydrAMINE (BENADRYL) 25 mg capsule Take 50 mg ( 2 tablets ) by mouth 13 hours prior to your procedure (at 1 am) ;  then again 7 hours prior ( at 7 am)  , and again  ( at 12 pm ) upon arrival to the hospital. (Patient not taking: Reported on 9/24/2024)    DULoxetine (CYMBALTA) 20 MG capsule Take 1 capsule (20 mg total) by mouth once daily.    empagliflozin (JARDIANCE) 10 mg tablet Take 1 tablet (10 mg total) by mouth once daily. (Patient not taking: Reported on 9/24/2024)    estradioL (ESTRACE) 0.01 % (0.1 mg/gram) vaginal cream Place  vaginally.    fexofenadine (ALLEGRA) 60 MG tablet Take 60 mg by mouth once daily.    fluconazole (DIFLUCAN) 150 MG Tab Take 150 mg by mouth.    furosemide (LASIX) 40 MG tablet Take 1 tablet (40 mg total) by mouth 2 (two) times a day.    gabapentin (NEURONTIN) 600 MG tablet Take 600 mg by mouth 3 (three) times daily.    HYDROcodone-acetaminophen (NORCO) 5-325 mg per tablet Take 1 tablet by mouth every 4 (four) hours as needed for Pain. (Patient not taking: Reported on 9/24/2024)    isosorbide mononitrate (IMDUR) 60 MG 24 hr tablet Take 1 tablet (60 mg total) by mouth once daily.    LORazepam (ATIVAN) 0.5 MG tablet Take 1 tablet (0.5 mg total) by mouth every morning.    losartan (COZAAR) 50 MG Tab Take 1 tablet (50 mg total) by mouth once daily.    metFORMIN (GLUCOPHAGE) 500 MG tablet Take 1 tablet (500 mg total) by mouth 2 (two) times daily.    montelukast (SINGULAIR) 10 mg tablet Take 10 mg by mouth once daily.    mupirocin (BACTROBAN) 2 % ointment Apply topically every other day.    mv-mn/folic ac/calcium/vit K1 (WOMEN'S 50 PLUS MULTIVITAMIN ORAL) Take 1 tablet by mouth once daily.    nitroGLYCERIN (NITROSTAT) 0.4 MG SL tablet Place 1 tablet (0.4 mg total) under the tongue every 5 (five) minutes as needed for Chest pain.    pantoprazole (PROTONIX) 40 MG tablet Take 1 tablet (40 mg total) by mouth once daily.    predniSONE (DELTASONE) 50 MG Tab Take 50 mg ( 1 tablet ) by mouth 13 hours prior to your procedure (at 1 am)  ;  then again 7 hours prior ( at 7 am)  , and again  ( at 12 pm ) upon arrival to the hospital. (Patient not taking: Reported on 9/24/2024)    ranolazine (RANEXA) 1,000 mg Tb12 Take 1 tablet (1,000 mg total) by mouth 2 (two) times daily.    spironolactone (ALDACTONE) 25 MG tablet Take 1 tablet (25 mg total) by mouth once daily.    SYMBICORT 160-4.5 mcg/actuation HFAA Inhale 1 puff into the lungs 2 (two) times daily.     Family History       Problem Relation (Age of Onset)    Heart attack Mother     Heart failure Brother    Stroke Father          Tobacco Use    Smoking status: Never    Smokeless tobacco: Never   Substance and Sexual Activity    Alcohol use: No    Drug use: Never    Sexual activity: Not Currently     Partners: Male     Review of Systems   Constitutional:  Positive for activity change. Negative for chills and fever.   Respiratory:  Positive for shortness of breath. Negative for cough, chest tightness and wheezing.    Cardiovascular:  Positive for leg swelling. Negative for chest pain and palpitations.   Gastrointestinal:  Negative for abdominal distention, abdominal pain, diarrhea, nausea and vomiting.   Genitourinary:  Negative for dysuria, flank pain and urgency.   Musculoskeletal:  Negative for arthralgias, back pain and gait problem.   Neurological:  Negative for dizziness and weakness.   Psychiatric/Behavioral:  Positive for confusion (mild memory issues x1 year). Negative for decreased concentration.      Objective:     Vital Signs (Most Recent):  Temp: 98.3 °F (36.8 °C) (09/25/24 0011)  Pulse: 60 (09/25/24 0011)  Resp: 20 (09/25/24 0011)  BP: (!) 198/89 (09/25/24 0011)  SpO2: 96 % (09/25/24 0011) Vital Signs (24h Range):  Temp:  [97.6 °F (36.4 °C)-98.3 °F (36.8 °C)] 98.3 °F (36.8 °C)  Pulse:  [60] 60  Resp:  [18-25] 20  SpO2:  [96 %-100 %] 96 %  BP: (137-199)/(62-89) 198/89     Weight: 94.9 kg (209 lb 3.5 oz)  Body mass index is 35.91 kg/m².     Physical Exam  Vitals and nursing note reviewed.   Constitutional:       General: She is not in acute distress.     Appearance: She is well-developed.   HENT:      Head: Normocephalic and atraumatic.      Mouth/Throat:      Pharynx: No oropharyngeal exudate.   Eyes:      General: No scleral icterus.     Conjunctiva/sclera: Conjunctivae normal.   Cardiovascular:      Rate and Rhythm: Normal rate and regular rhythm.      Heart sounds: Normal heart sounds.   Pulmonary:      Effort: Pulmonary effort is normal. No respiratory distress.      Breath  sounds: Rales present. No wheezing.      Comments: Crackles to mid- lower lungs  Abdominal:      General: Bowel sounds are normal. There is no distension.      Palpations: Abdomen is soft.      Tenderness: There is no abdominal tenderness.   Musculoskeletal:         General: No tenderness. Normal range of motion.      Cervical back: Normal range of motion and neck supple.      Right lower leg: Edema present.      Left lower leg: Edema present.   Lymphadenopathy:      Cervical: No cervical adenopathy.   Skin:     General: Skin is warm and dry.      Capillary Refill: Capillary refill takes less than 2 seconds.      Findings: Bruising (R wrist, improving per daughter) present. No rash.   Neurological:      Mental Status: She is alert and oriented to person, place, and time.      Cranial Nerves: No cranial nerve deficit.      Sensory: No sensory deficit.      Coordination: Coordination normal.   Psychiatric:         Behavior: Behavior normal.         Thought Content: Thought content normal.         Judgment: Judgment normal.                Significant Labs: All pertinent labs within the past 24 hours have been reviewed.  CBC:   Recent Labs   Lab 09/24/24  1856   WBC 5.89   HGB 10.6*   HCT 34.3*        CMP:   Recent Labs   Lab 09/24/24  1856   *   K 4.9   CL 96   CO2 28   GLU 96   BUN 22   CREATININE 0.8   CALCIUM 9.3   PROT 6.9   ALBUMIN 3.7   BILITOT 0.5   ALKPHOS 36*   AST 17   ALT 11   ANIONGAP 9       Significant Imaging: I have reviewed all pertinent imaging results/findings within the past 24 hours.  X-Ray Chest AP Portable  Narrative: EXAMINATION:  XR CHEST AP PORTABLE    CLINICAL HISTORY:  CHF;    TECHNIQUE:  Single frontal view of the chest was performed.    COMPARISON:  05/29/2024 chest x-ray    FINDINGS:  The cardiac silhouette is stable and borderline size.  Multi lead pacemaker is stable.  There is mild opacification left lung base and perihilar opacities raise question of mild CHF.   Costophrenic angle blunting on the left raise question of a small pleural effusion.  Impression: Mild congestive changes.  Possible small left pleural effusion.    Electronically signed by: Sade Phipps  Date:    09/24/2024  Time:    23:56

## 2024-09-25 NOTE — ED PROVIDER NOTES
Encounter Date: 9/24/2024       History     Chief Complaint   Patient presents with    Shortness of Breath     SENT FROM cards clinic fluid overloaded, on 2l nc     The history is provided by the patient.     Patient is an 79-year-old female with a past medical history of HTN, diabetes, anxiety, CAD, AFib currently on Eliquis, asthma, GERD, CVA, COPD on 2 L at baseline who presents from cardiology clinic for IV diuresis secondary to worsening CHF.  Patient's daughter notes that she has been having worsening shortness of breath, especially on exertion over the past 2 weeks which is what prompted them to go to cardiology clinic.  While at cardiology clinic, they evaluated her and stated that she would be a good candidate for IV diuresis/recommended that she get admitted to the hospital so that she can receive this for a few days.  Here she states that she feels stable but that she is continuing to have shortness of breath especially with exertion/lying down.  Other than the shortness of breath, she is denying having any chest pain abdominal pain or any other symptoms at this time.    Review of patient's allergies indicates:   Allergen Reactions    Celebrex [celecoxib] Shortness Of Breath    Ciprofloxacin Swelling     lip    Dicyclomine Hives    Adhesive Dermatitis    Avelox [moxifloxacin] Swelling    Cilostazol Other (See Comments)     Elevates blood pressure    Eryc [erythromycin] Other (See Comments)     unknown    Iodine and iodide containing products Hives    Keflex [cephalexin] Hives    Meclomen      rashes    Meloxicam      Ear ringing    Metoclopramide Other (See Comments)     High blood pressure    Sulfa (sulfonamide antibiotics) Itching     Past Medical History:   Diagnosis Date    Acute on chronic diastolic (congestive) heart failure 11/20/2019    Anticoagulant long-term use     on Plavix since May 2015    Anxiety     Arthritis     Asthma     Atrial fibrillation     Atrial fibrillation with RVR 10/19/2020     Cataracts, bilateral     Chest pain, atypical     Chronic atrial fibrillation with rapid ventricular response 06/23/2017    Colon polyp     Coronary artery disease     Diabetes mellitus     Dry eyes     Esophageal erosions     General anesthetics causing adverse effect in therapeutic use     GERD (gastroesophageal reflux disease)     Heart murmur     Hemorrhoid     High cholesterol     Hypertension     Irritable bowel syndrome     Paroxysmal atrial fibrillation 10/30/2020    Persistent atrial fibrillation 02/10/2020    Shingles 2015    Stenosis of aortic and mitral valves     Stroke     TIA (transient ischemic attack)     Use of cane as ambulatory aid      Past Surgical History:   Procedure Laterality Date    ABDOMINAL SURGERY      stents to SMA    angiocele      2007, 2014    AORTIC VALVE REPLACEMENT N/A 11/19/2019    Procedure: Replacement-valve-aortic;  Surgeon: Jack Capone MD;  Location: [facility] CATH LAB;  Service: Cardiology;  Laterality: N/A;    BREAST SURGERY      left--- a lump--- no cancer    CARDIAC VALVE SURGERY      COLONOSCOPY  2014    COLONOSCOPY N/A 3/14/2018    Procedure: COLONOSCOPY;  Surgeon: Efren Kemp MD;  Location: [facility] ENDO [address]);  Service: Endoscopy;  Laterality: N/A;  ok to hold Plavix 5 days prior to procedure per Dr RESHMA Hernandez     ok per Dr Kemp to hold Eliquis 2 days prior to procedure (see telephone encounter dated-2/7/18)    CORONARY ANGIOGRAPHY N/A 2/15/2024    Procedure: ANGIOGRAM, CORONARY ARTERY;  Surgeon: Jos Baptiste MD;  Location: [facility] CATH LAB;  Service: Cardiology;  Laterality: N/A;    HERNIA REPAIR      HYSTERECTOMY  partial    1982 partial hysterectomy    IMPLANTATION OF BIVENTRICULAR PERMANENT PACEMAKER AS UPGRADE TO EXISTING PACEMAKER Left 5/29/2024    Procedure: Biventricular pacemaker upgrade;  Surgeon: Chencho Moeller MD;  Location: [facility] EP LAB;  Service: Cardiology;  Laterality: Left;  CHF, AF,  CRTP ugrd, BIO, MAC, ID, 3prep ** BIO dcPPM in situ/ Contrast  Prep**    LEFT HEART CATHETERIZATION Right 11/5/2019    Procedure: Left heart cath;  Surgeon: Jack Capone MD;  Location: [facility] CATH LAB;  Service: Cardiology;  Laterality: Right;    left nasal polyp      left neck lymph node      nose polyp      PHLEBOGRAPHY Left 5/1/2024    Procedure: Venogram;  Surgeon: Chencho Moeller MD;  Location: [facility] EP LAB;  Service: Cardiology;  Laterality: Left;  HF, Venogram ( Lt arm) , HI, 3 prep ** BIO dcPPM in situ/ Contrast Prep**    right hip fatty mass tissue      stent to small intestine      SUPERIOR VENA CAVA ANGIOPLASTY / STENTING      TONSILLECTOMY      TOTAL ABDOMINAL HYSTERECTOMY      2014    TOTAL KNEE ARTHROPLASTY Bilateral     TREATMENT OF CARDIAC ARRHYTHMIA N/A 2/10/2020    Procedure: CARDIOVERSION;  Surgeon: Jayy Parrish MD;  Location: [facility] EP LAB;  Service: Cardiology;  Laterality: N/A;  AF, PEDRO, DCCV, MAC, DM, 3 Prep    TREATMENT OF CARDIAC ARRHYTHMIA N/A 5/15/2020    Procedure: CARDIOVERSION;  Surgeon: Hany Bee MD;  Location: [facility] EP LAB;  Service: Cardiology;  Laterality: N/A;  AF, PEDRO, DCCV, MAC, DM, 3 Prep    UPPER GASTROINTESTINAL ENDOSCOPY  2014     Family History   Problem Relation Name Age of Onset    Heart attack Mother      Stroke Father      Heart failure Brother      Colon cancer Neg Hx      Inflammatory bowel disease Neg Hx       Social History     Tobacco Use    Smoking status: Never    Smokeless tobacco: Never   Substance Use Topics    Alcohol use: No    Drug use: Never     Physical Exam     Initial Vitals [09/24/24 1655]   BP Pulse Resp Temp SpO2   137/62 60 20 97.6 °F (36.4 °C) 96 %      MAP       --         Physical Exam    Nursing note and vitals reviewed.  Constitutional: She appears well-developed. No distress.   HENT:   Head: Normocephalic and atraumatic.   Mouth/Throat: Oropharynx is clear and moist and mucous membranes are normal.   Eyes: EOM are normal. Pupils are equal, round, and reactive to light.   Neck:   Normal range of  motion.  Cardiovascular:  Normal rate and regular rhythm.           Systolic heart murmur auscultated most prominently in the 2nd intercostal space   Pulmonary/Chest:   No tachypnea, however the patient appears mildly uncomfortable with taking deep breaths. There are scattered crackles in the middle/lower lung fields.   Abdominal: Abdomen is soft. She exhibits no distension. There is no abdominal tenderness. There is no rebound and no guarding.   Musculoskeletal:      Cervical back: Normal range of motion.      Comments: 2+ pitting     Neurological: She is alert and oriented to person, place, and time.   Skin: Skin is warm.   Psychiatric: She has a normal mood and affect. Her behavior is normal. Thought content normal.         ED Course   Procedures  Labs Reviewed   CBC W/ AUTO DIFFERENTIAL - Abnormal       Result Value    WBC 5.89      RBC 3.59 (*)     Hemoglobin 10.6 (*)     Hematocrit 34.3 (*)     MCV 96      MCH 29.5      MCHC 30.9 (*)     RDW 15.8 (*)     Platelets 262      MPV 10.0      Immature Granulocytes 0.3      Gran # (ANC) 3.9      Immature Grans (Abs) 0.02      Lymph # 1.3      Mono # 0.6      Eos # 0.0      Baso # 0.05      nRBC 0      Gran % 66.2      Lymph % 22.6      Mono % 9.8      Eosinophil % 0.3      Basophil % 0.8      Differential Method Automated     COMPREHENSIVE METABOLIC PANEL - Abnormal    Sodium 133 (*)     Potassium 4.9      Chloride 96      CO2 28      Glucose 96      BUN 22      Creatinine 0.8      Calcium 9.3      Total Protein 6.9      Albumin 3.7      Total Bilirubin 0.5      Alkaline Phosphatase 36 (*)     AST 17      ALT 11      eGFR >60.0      Anion Gap 9     B-TYPE NATRIURETIC PEPTIDE - Abnormal     (*)    HIV 1 / 2 ANTIBODY    HIV 1/2 Ag/Ab Non-reactive      Narrative:     Release to patient->Immediate   HEPATITIS C ANTIBODY    Hepatitis C Ab Non-reactive      Narrative:     Release to patient->Immediate   TROPONIN I    Troponin I <0.006       EKG Readings:  (Independently Interpreted)   Independently interpreted by MD:  Rate 60, ventricularly paced rhythm, NSR, no stemi, no ectopy, no hypertrophy, left bundle branch block present           Imaging Results    None          Medications - No data to display  Medical Decision Making  Patient was an 79-year-old female who presents from cardiology clinic secondary to worsening heart failure.  Although she appeared hemodynamically stable and had reassuring vital signs, her lung exam was significant for crackles and in the setting of worsening orthopnea as well as evaluation from Cardiology Clinic, we will perform a cardiac workup.  We will give a dose of Lasix for symptomatic management.    Laboratory workup overall reassuring, with the only a slight elevation in her BNP.  Nevertheless, given her clinical findings/cardiology recommendations, we will admit to Hospital Medicine for IV diuresis    Amount and/or Complexity of Data Reviewed  External Data Reviewed: notes.     Details:  09/24/2024:  Acute on chronic systolic heart failure  Patient with NICM EF 35-40% with signs and symptoms indicative of acute on chronic heart failure. She appears volume overloaded today based on lung bibasilar crackles, LE edema, JVP.   Will send her to ER for further evaluation and  hospital admission for decompensated heart failure as she will require IV diuresis.     NICM  GDMT (carvedilol 6.25mg BID, spironolactone 25mg qd, losartan 50mg qd, jardiance (held by family due to concerns for hx of recurrent UTIs)     Paroxysmal Atrial fibrillation with slow ventricular response  Currently in NSR  Continue eliquis      S/P TAVR (transcatheter aortic valve replacement)  TAVR in 2019, functioning well on repeat TTE in February Aortic valve area by VTI is 1.03 cm². Aortic valve peak velocity is 2.07 m/s. Mean gradient is 11 mmHg. The dimensionless index is 0.30.      Presence of cardiac pacemaker  Underwent BiV upgrade in May  2024 by   Sajan    Labs: ordered.  Radiology: ordered.    Risk  Prescription drug management.  Decision regarding hospitalization.                                Clinical Impression:  Final diagnoses:  [R68.89] Multiple complaints  [R06.02] Shortness of breath                 Ankur Mederos MD  Resident  09/24/24 [id number]

## 2024-09-25 NOTE — ED NOTES
Telemetry Verification   Patient placed on Telemetry Box  Verified with War Room  Box # [id number]   Monitor Tech War room   Rate 59   Rhythm Sinus mar

## 2024-09-25 NOTE — PROGRESS NOTES
[facility] ([facility]) Team notified of pt referral via [facility] (automated inbasket notification) .    Patient screened today 9/25/2024 by provider and LPN for enrollment to program.      Call [facility] if anyone tries to change your Fluid pills or Heart medications.   Do daily weights to see if you are gaining fluid. Upon waking empty your bladder weigh yourself without much clothing and before you eat or drink anything. Record your weights and compare them for weight gain of 3 - 4lbs overnight or 5lbs in 3 days. Call [facility] if you have this.  Follow a low Salt/Sodium diet (2,000-3,000mg sodium) and limit your fluid to 1.5 - 2 liters a day.  A liter is a quart. Measure all that you drink.   Stop smoking and start exercising.   Keep all your appointments and call the [facility] if you have any SOB or signs of fluid gain.     Pt was deemed not a candidate for enrollment at this time related to patient refused. The pt's  stated she has been apart of our clinic twice before and they don't think she will be doing in for the third time.    Pt will require additional follow up planning per primary team.     If pt status, diagnosis, or treatment plan changes , please place AMB referral to [facility] ([id number]) for [facility] enrollment re-evalution.

## 2024-09-25 NOTE — ED NOTES
Adriana Strong, a 79 y.o. female presents to the ED w/ complaint of Increase in shortness of breath on exertion.    Triage note:  Chief Complaint   Patient presents with    Shortness of Breath     [facility] fluid over[facility], on 2l nc     Review of patient's allergies indicates:   Allergen Reactions    Celebrex [celecoxib] Shortness Of Breath    Ciprofloxacin Swelling     lip    Dicyclomine Hives    Adhesive Dermatitis    Avelox [moxifloxacin] Swelling    Cilostazol Other (See Comments)     Elevates blood pressure    Eryc [erythromycin] Other (See Comments)     unknown    Iodine and iodide containing products Hives    Keflex [cephalexin] Hives    Meclomen      rashes    Meloxicam      Ear ringing    Metoclopramide Other (See Comments)     High blood pressure    Sulfa (sulfonamide antibiotics) Itching     Past Medical History:   Diagnosis Date    Acute on chronic diastolic (congestive) heart failure 11/20/2019    Anticoagulant long-term use     on Plavix since May 2015    Anxiety     Arthritis     Asthma     Atrial fibrillation     Atrial fibrillation with RVR 10/19/2020    Cataracts, bilateral     Chest pain, atypical     Chronic atrial fibrillation with rapid ventricular response 06/23/2017    Colon polyp     Coronary artery disease     Diabetes mellitus     Dry eyes     Esophageal erosions     General anesthetics causing adverse effect in therapeutic use     GERD (gastroesophageal reflux disease)     Heart murmur     Hemorrhoid     High cholesterol     Hypertension     Irritable bowel syndrome     Paroxysmal atrial fibrillation 10/30/2020    Persistent atrial fibrillation 02/10/2020    Shingles 2015    Stenosis of aortic and mitral valves     Stroke     TIA (transient ischemic attack)     Use of cane as ambulatory aid      LOC: The patient is awake, alert, aware of environment with an appropriate affect. Oriented x4, speaking appropriately  APPEARANCE: Pt resting comfortably, in no acute distress, pt  is clean and well groomed, clothing properly fastened  SKIN:The skin is warm and dry, color consistent with ethnicity, patient has normal skin turgor and moist mucus membranes,  bruising noted to bilateral upper extremities  RESPIRATORY:Airway is open and patent, respirations are spontaneous, patient has a normal effort and rate, no accessory muscle use noted. Pt on 2lnc. Increaased dyspnea on exertion with a cough.   CARDIAC: paced rate and rhythm, mild peripheral edema noted, capillary refill < 3 seconds, bilateral radial pulses 2+.  ABDOMEN: Soft, non tender, non distended. Obese. No complaints of n/v/d  NEUROLOGIC: PERRLA, facial expression is symmetrical, patient moving all extremities spontaneously, normal sensation in all extremities when touched with a finger.  Follows all commands appropriately  MUSCULOSKELETAL: Patient moving all extremities spontaneously, no obvious swelling or deformities noted.

## 2024-09-26 LAB
ANION GAP SERPL CALC-SCNC: 10 MMOL/L (ref 8–16)
BASOPHILS # BLD AUTO: 0.03 K/UL (ref 0–0.2)
BASOPHILS NFR BLD: 0.4 % (ref 0–1.9)
BUN SERPL-MCNC: 19 MG/DL (ref 8–23)
CALCIUM SERPL-MCNC: 9.2 MG/DL (ref 8.7–10.5)
CHLORIDE SERPL-SCNC: 97 MMOL/L (ref 95–110)
CO2 SERPL-SCNC: 31 MMOL/L (ref 23–29)
CREAT SERPL-MCNC: 0.7 MG/DL (ref 0.5–1.4)
DIFFERENTIAL METHOD BLD: ABNORMAL
EOSINOPHIL # BLD AUTO: 0 K/UL (ref 0–0.5)
EOSINOPHIL NFR BLD: 0.3 % (ref 0–8)
ERYTHROCYTE [DISTWIDTH] IN BLOOD BY AUTOMATED COUNT: 16.1 % (ref 11.5–14.5)
EST. GFR  (NO RACE VARIABLE): >60 ML/MIN/1.73 M^2
GLUCOSE SERPL-MCNC: 113 MG/DL (ref 70–110)
HCT VFR BLD AUTO: 31.1 % (ref 37–48.5)
HGB BLD-MCNC: 9.9 G/DL (ref 12–16)
IMM GRANULOCYTES # BLD AUTO: 0.03 K/UL (ref 0–0.04)
IMM GRANULOCYTES NFR BLD AUTO: 0.4 % (ref 0–0.5)
LYMPHOCYTES # BLD AUTO: 1.5 K/UL (ref 1–4.8)
LYMPHOCYTES NFR BLD: 21.2 % (ref 18–48)
MAGNESIUM SERPL-MCNC: 1.7 MG/DL (ref 1.6–2.6)
MCH RBC QN AUTO: 29.4 PG (ref 27–31)
MCHC RBC AUTO-ENTMCNC: 31.8 G/DL (ref 32–36)
MCV RBC AUTO: 92 FL (ref 82–98)
MONOCYTES # BLD AUTO: 1 K/UL (ref 0.3–1)
MONOCYTES NFR BLD: 13.6 % (ref 4–15)
NEUTROPHILS # BLD AUTO: 4.7 K/UL (ref 1.8–7.7)
NEUTROPHILS NFR BLD: 64.1 % (ref 38–73)
NRBC BLD-RTO: 0 /100 WBC
PLATELET # BLD AUTO: 246 K/UL (ref 150–450)
PMV BLD AUTO: 10.2 FL (ref 9.2–12.9)
POCT GLUCOSE: 117 MG/DL (ref 70–110)
POCT GLUCOSE: 117 MG/DL (ref 70–110)
POCT GLUCOSE: 132 MG/DL (ref 70–110)
POCT GLUCOSE: 134 MG/DL (ref 70–110)
POTASSIUM SERPL-SCNC: 4.1 MMOL/L (ref 3.5–5.1)
RBC # BLD AUTO: 3.37 M/UL (ref 4–5.4)
SODIUM SERPL-SCNC: 138 MMOL/L (ref 136–145)
WBC # BLD AUTO: 7.28 K/UL (ref 3.9–12.7)

## 2024-09-26 PROCEDURE — 25000003 PHARM REV CODE 250: Performed by: INTERNAL MEDICINE

## 2024-09-26 PROCEDURE — 63600175 PHARM REV CODE 636 W HCPCS: Performed by: PHYSICIAN ASSISTANT

## 2024-09-26 PROCEDURE — 80048 BASIC METABOLIC PNL TOTAL CA: CPT | Performed by: PHYSICIAN ASSISTANT

## 2024-09-26 PROCEDURE — 25000003 PHARM REV CODE 250: Performed by: PHYSICIAN ASSISTANT

## 2024-09-26 PROCEDURE — 20600001 HC STEP DOWN PRIVATE ROOM

## 2024-09-26 PROCEDURE — 83735 ASSAY OF MAGNESIUM: CPT | Performed by: PHYSICIAN ASSISTANT

## 2024-09-26 PROCEDURE — 25000242 PHARM REV CODE 250 ALT 637 W/ HCPCS: Performed by: INTERNAL MEDICINE

## 2024-09-26 PROCEDURE — 63600175 PHARM REV CODE 636 W HCPCS: Performed by: INTERNAL MEDICINE

## 2024-09-26 PROCEDURE — 85025 COMPLETE CBC W/AUTO DIFF WBC: CPT | Performed by: PHYSICIAN ASSISTANT

## 2024-09-26 PROCEDURE — 36415 COLL VENOUS BLD VENIPUNCTURE: CPT | Performed by: PHYSICIAN ASSISTANT

## 2024-09-26 RX ORDER — FUROSEMIDE 10 MG/ML
40 INJECTION INTRAMUSCULAR; INTRAVENOUS ONCE
Status: DISCONTINUED | OUTPATIENT
Start: 2024-09-26 | End: 2024-09-26

## 2024-09-26 RX ORDER — FUROSEMIDE 10 MG/ML
80 INJECTION INTRAMUSCULAR; INTRAVENOUS EVERY 12 HOURS
Status: DISCONTINUED | OUTPATIENT
Start: 2024-09-26 | End: 2024-09-26

## 2024-09-26 RX ORDER — FUROSEMIDE 10 MG/ML
40 INJECTION INTRAMUSCULAR; INTRAVENOUS EVERY 12 HOURS
Status: DISCONTINUED | OUTPATIENT
Start: 2024-09-26 | End: 2024-09-28

## 2024-09-26 RX ORDER — AMOXICILLIN AND CLAVULANATE POTASSIUM 875; 125 MG/1; MG/1
1 TABLET, FILM COATED ORAL EVERY 12 HOURS
Status: DISCONTINUED | OUTPATIENT
Start: 2024-09-26 | End: 2024-09-28 | Stop reason: HOSPADM

## 2024-09-26 RX ADMIN — ATORVASTATIN CALCIUM 10 MG: 10 TABLET, FILM COATED ORAL at 08:09

## 2024-09-26 RX ADMIN — PANTOPRAZOLE SODIUM 40 MG: 40 TABLET, DELAYED RELEASE ORAL at 08:09

## 2024-09-26 RX ADMIN — FUROSEMIDE 40 MG: 10 INJECTION, SOLUTION INTRAVENOUS at 08:09

## 2024-09-26 RX ADMIN — GABAPENTIN 600 MG: 300 CAPSULE ORAL at 08:09

## 2024-09-26 RX ADMIN — AMOXICILLIN AND CLAVULANATE POTASSIUM 1 TABLET: 875; 125 TABLET, FILM COATED ORAL at 02:09

## 2024-09-26 RX ADMIN — APIXABAN 5 MG: 5 TABLET, FILM COATED ORAL at 08:09

## 2024-09-26 RX ADMIN — MONTELUKAST 10 MG: 10 TABLET, FILM COATED ORAL at 08:09

## 2024-09-26 RX ADMIN — CARVEDILOL 6.25 MG: 6.25 TABLET, FILM COATED ORAL at 05:09

## 2024-09-26 RX ADMIN — EMPAGLIFLOZIN 10 MG: 10 TABLET, FILM COATED ORAL at 09:09

## 2024-09-26 RX ADMIN — APIXABAN 5 MG: 5 TABLET, FILM COATED ORAL at 09:09

## 2024-09-26 RX ADMIN — CLOPIDOGREL BISULFATE 75 MG: 75 TABLET ORAL at 09:09

## 2024-09-26 RX ADMIN — LOSARTAN POTASSIUM 50 MG: 50 TABLET, FILM COATED ORAL at 09:09

## 2024-09-26 RX ADMIN — LORAZEPAM 0.5 MG: 0.5 TABLET ORAL at 09:09

## 2024-09-26 RX ADMIN — FUROSEMIDE 40 MG: 10 INJECTION, SOLUTION INTRAVENOUS at 09:09

## 2024-09-26 RX ADMIN — ISOSORBIDE MONONITRATE 60 MG: 60 TABLET, EXTENDED RELEASE ORAL at 09:09

## 2024-09-26 RX ADMIN — AMOXICILLIN AND CLAVULANATE POTASSIUM 1 TABLET: 875; 125 TABLET, FILM COATED ORAL at 09:09

## 2024-09-26 RX ADMIN — DULOXETINE HYDROCHLORIDE 20 MG: 20 CAPSULE, DELAYED RELEASE ORAL at 08:09

## 2024-09-26 RX ADMIN — GABAPENTIN 600 MG: 300 CAPSULE ORAL at 09:09

## 2024-09-26 RX ADMIN — RANOLAZINE 1000 MG: 500 TABLET, EXTENDED RELEASE ORAL at 09:09

## 2024-09-26 RX ADMIN — SPIRONOLACTONE 25 MG: 25 TABLET ORAL at 08:09

## 2024-09-26 RX ADMIN — ACETAMINOPHEN 650 MG: 325 TABLET ORAL at 11:09

## 2024-09-26 RX ADMIN — GABAPENTIN 600 MG: 300 CAPSULE ORAL at 02:09

## 2024-09-26 RX ADMIN — CARVEDILOL 6.25 MG: 6.25 TABLET, FILM COATED ORAL at 09:09

## 2024-09-26 RX ADMIN — RANOLAZINE 1000 MG: 500 TABLET, EXTENDED RELEASE ORAL at 08:09

## 2024-09-26 NOTE — SUBJECTIVE & OBJECTIVE
Interval History: Reports that shortness of breath has improved. Of note, she was recently prescribed Augmentin for UTI as an outpatient, notably she complained of dysuria, which resolved only received 2 doses thus far, urinalysis here is unremarkable. She was prescribed Augmentin 1 g b.i.d. for 7 days.  Repeat echo shows normal EF and diastolic dysfunction    Review of Systems   Unable to perform ROS: Other     Objective:     Vital Signs (Most Recent):  Temp: 97.8 °F (36.6 °C) (09/26/24 1108)  Pulse: (!) 59 (09/26/24 1123)  Resp: 17 (09/26/24 1108)  BP: 134/60 (09/26/24 1108)  SpO2: 98 % (09/26/24 1108) Vital Signs (24h Range):  Temp:  [97.6 °F (36.4 °C)-98.3 °F (36.8 °C)] 97.8 °F (36.6 °C)  Pulse:  [53-60] 59  Resp:  [17-18] 17  SpO2:  [95 %-100 %] 98 %  BP: (128-155)/(58-76) 134/60     Weight: 93 kg (205 lb 0.4 oz)  Body mass index is 35.19 kg/m².    Intake/Output Summary (Last 24 hours) at 9/26/2024 1326  Last data filed at 9/26/2024 1200  Gross per 24 hour   Intake 582 ml   Output 1550 ml   Net -968 ml         Physical Exam  Constitutional:       General: She is not in acute distress.     Appearance: She is obese.   HENT:      Head: Normocephalic.      Right Ear: External ear normal.      Left Ear: External ear normal.      Nose: Nose normal.      Comments: Nasal cannula  Eyes:      General: No scleral icterus.  Neck:      Comments: JVD  Cardiovascular:      Rate and Rhythm: Normal rate.   Pulmonary:      Breath sounds: Wheezing present.   Abdominal:      Palpations: Abdomen is soft.      Tenderness: There is no abdominal tenderness.   Musculoskeletal:      Right lower leg: Edema present.      Left lower leg: Edema present.   Neurological:      General: No focal deficit present.      Mental Status: She is alert. Mental status is at baseline.             Significant Labs: All pertinent labs within the past 24 hours have been reviewed.

## 2024-09-26 NOTE — PROGRESS NOTES
Hospital Medicine  Progress note    Team: [facility] HOSP MED C Alena Damon MD  Admit Date: 9/24/2024  Code status: Full Code    Principal Problem:  Acute on chronic combined systolic and diastolic congestive heart failure    Interval hx:  No new complaint. State she has lump on left lateral lower leg    PEx  Temp:  [97.6 °F (36.4 °C)-98.3 °F (36.8 °C)]   Pulse:  [59-60]   Resp:  [17-25]   BP: (121-198)/(56-89)   SpO2:  [96 %-100 %]     Intake/Output Summary (Last 24 hours) at 9/25/2024 2142  Last data filed at 9/25/2024 1231  Gross per 24 hour   Intake 480 ml   Output 400 ml   Net 80 ml       General Appearance: no acute distress, WD, elderly  Heart: regular rate and rhythm, no heave, minimal JVD, trace pitting edema of BLE up to knee, 1+ pitting edema on dependent area of her buttocks, 1+ pitting edema of LUE  Respiratory: Normal respiratory effort, symmetric excursion, bilateral vesicular breath sounds   Abdomen: Soft, non-tender; bowel sounds active  Skin: intact, no rash, no ulcers  Neurologic:  No focal numbness or weakness  Mental status: Alert, oriented x 4, affect appropriate  Ext: no lump on lower left leg palpated    Recent Labs   Lab 09/24/24 1856 09/25/24  0312   WBC 5.89 8.95   HGB 10.6* 9.5*   HCT 34.3* 30.0*    241     Recent Labs   Lab 09/24/24 1856 09/25/24  0312   * 133*   K 4.9 4.0   CL 96 95   CO2 28 28   BUN 22 19   CREATININE 0.8 0.7   GLU 96 121*   CALCIUM 9.3 9.1   MG  --  1.6     Recent Labs   Lab 09/24/24 1856   ALKPHOS 36*   ALT 11   AST 17   ALBUMIN 3.7   PROT 6.9   BILITOT 0.5        Recent Labs   Lab 09/25/24  0552 09/25/24  1121 09/25/24  1542 09/25/24  1928   POCTGLUCOSE 110 134* 118* 143*       Scheduled Meds:   apixaban  5 mg Oral BID    atorvastatin  10 mg Oral Daily    carvediloL  6.25 mg Oral BID WM    clopidogreL  75 mg Oral Daily    DULoxetine  20 mg Oral Daily    furosemide (LASIX) injection  40 mg Intravenous Q12H    gabapentin  600 mg Oral TID    isosorbide  mononitrate  60 mg Oral Daily    LORazepam  0.5 mg Oral Daily    INV losartan  50 mg Oral Daily    montelukast  10 mg Oral Daily    pantoprazole  40 mg Oral Daily    ranolazine  1,000 mg Oral BID    spironolactone  25 mg Oral Daily     Continuous Infusions:  As Needed:    Current Facility-Administered Medications:     acetaminophen, 650 mg, Oral, Q4H PRN    albuterol-ipratropium, 3 mL, Nebulization, Q4H PRN    bisacodyL, 10 mg, Rectal, Daily PRN    dextrose 10%, 12.5 g, Intravenous, PRN    dextrose 10%, 25 g, Intravenous, PRN    glucagon (human recombinant), 1 mg, Intramuscular, PRN    glucose, 16 g, Oral, PRN    glucose, 24 g, Oral, PRN    insulin aspart U-100, 0-5 Units, Subcutaneous, QID (AC + HS) PRN    melatonin, 6 mg, Oral, Nightly PRN    polyethylene glycol, 17 g, Oral, Daily PRN    sodium chloride 0.9%, 10 mL, Intravenous, PRN    Assessment and Plan  / Problems managed today    * Acute on chronic combined systolic and diastolic congestive heart failure  - appears volume overload on exam  - , CXR w/ pulm edema  - start IV lasix 40mg BID  - repeat echo  - continue GDMT  - goal K>4, Mg>2  - monitor tele  - strict I/Os, daily weights  - GDMT - spironolactone 25 mg daily, carvedilol 6.25 mg BID, losartan 50 mg daily   Start jardiance 10 mg daily    Chronic respiratory failure with hypoxia  - stable on home 2L NC    History of transcatheter aortic valve replacement (TAVR)  - noted hx  - follow up echo    COPD (chronic obstructive pulmonary disease)  - no acute issues  - duonebs prn     Permanent atrial fibrillation  - continue coreg, eliquis     Essential hypertension  Chronic, uncontrolled. Latest blood pressure and vitals reviewed-     Temp:  [97.6 °F (36.4 °C)-98.3 °F (36.8 °C)]   Pulse:  [60]   Resp:  [18-25]   BP: (137-199)/(62-89)   SpO2:  [96 %-100 %] .   Home meds for hypertension were reviewed and noted below.   Hypertension Medications               carvediloL (COREG) 6.25 MG tablet Take 1 tablet  (6.25 mg total) by mouth 2 (two) times daily with meals.    furosemide (LASIX) 40 MG tablet Take 1 tablet (40 mg total) by mouth 2 (two) times a day.    isosorbide mononitrate (IMDUR) 60 MG 24 hr tablet Take 1 tablet (60 mg total) by mouth once daily.    losartan (COZAAR) 50 MG Tab Take 1 tablet (50 mg total) by mouth once daily.    nitroGLYCERIN (NITROSTAT) 0.4 MG SL tablet Place 1 tablet (0.4 mg total) under the tongue every 5 (five) minutes as needed for Chest pain.    spironolactone (ALDACTONE) 25 MG tablet Take 1 tablet (25 mg total) by mouth once daily.            While in the hospital, will manage blood pressure as follows; Continue home antihypertensive regimen    Will utilize p.r.n. blood pressure medication only if patient's blood pressure greater than 180/110 and she develops symptoms such as worsening chest pain or shortness of breath.    Type 2 diabetes mellitus  - hold home metformin  - BENJAMINSI, ACHS accuchecks  Lab Results   Component Value Date    HGBA1C 5.0 02/09/2024       Diet:  low sodium  GI PPx: protonix  DVT PPx:  apixaban  Lines: not needed  Wounds: room air    Goals of Care:  Return to prior functional status     Discharge Planning   MIMI: 9/27/2024   Is the patient medically ready for discharge?:     Reason for patient still in hospital (select all that apply): Patient trending condition and Treatment  Discharge Plan A: Home, Home with family        Alena Damon MD

## 2024-09-26 NOTE — PROGRESS NOTES
[facility] - Cardiology Stepdown  Wound Care    Patient Name:  Adriana Strong   MRN:  [id number]  Date: 9/26/2024  Diagnosis: Acute on chronic combined systolic and diastolic congestive heart failure    History:     Past Medical History:   Diagnosis Date    Acute on chronic diastolic (congestive) heart failure 11/20/2019    Anticoagulant long-term use     on Plavix since May 2015    Anxiety     Arthritis     Asthma     Atrial fibrillation     Atrial fibrillation with RVR 10/19/2020    Cataracts, bilateral     Chest pain, atypical     Chronic atrial fibrillation with rapid ventricular response 06/23/2017    Colon polyp     Coronary artery disease     Diabetes mellitus     Dry eyes     Esophageal erosions     General anesthetics causing adverse effect in therapeutic use     GERD (gastroesophageal reflux disease)     Heart murmur     Hemorrhoid     High cholesterol     Hypertension     Irritable bowel syndrome     Paroxysmal atrial fibrillation 10/30/2020    Persistent atrial fibrillation 02/10/2020    Shingles 2015    Stenosis of aortic and mitral valves     Stroke     TIA (transient ischemic attack)     Use of cane as ambulatory aid        Social History     Socioeconomic History    Marital status:    Tobacco Use    Smoking status: Never    Smokeless tobacco: Never   Substance and Sexual Activity    Alcohol use: No    Drug use: Never    Sexual activity: Not Currently     Partners: Male     Social Determinants of Health     Financial Resource Strain: Low Risk  (9/25/2024)    Overall Financial Resource Strain (CARDIA)     Difficulty of Paying Living Expenses: Not hard at all   Food Insecurity: No Food Insecurity (9/25/2024)    Hunger Vital Sign     Worried About Running Out of Food in the Last Year: Never true     Ran Out of Food in the Last Year: Never true   Transportation Needs: No Transportation Needs (9/25/2024)    TRANSPORTATION NEEDS     Transportation : No   Physical Activity: Insufficiently Active  (9/25/2024)    Exercise Vital Sign     Days of Exercise per Week: 7 days     Minutes of Exercise per Session: 10 min   Stress: No Stress Concern Present (9/25/2024)    [employer] [place] [employer] - Occupational Stress Questionnaire     Feeling of Stress : Only a little   Housing Stability: Low Risk  (9/25/2024)    Housing Stability Vital Sign     Unable to Pay for Housing in the Last Year: No     Homeless in the Last Year: No       Precautions:     Allergies as of 09/24/2024 - Reviewed 09/24/2024   Allergen Reaction Noted    Celebrex [celecoxib] Shortness Of Breath 11/23/2014    Ciprofloxacin Swelling 02/07/2018    Dicyclomine Hives 04/02/2015    Adhesive Dermatitis 02/22/2016    Avelox [moxifloxacin] Swelling 11/23/2014    Cilostazol Other (See Comments) 11/23/2014    Eryc [erythromycin] Other (See Comments) 11/07/2015    Iodine and iodide containing products Hives 11/23/2014    Keflex [cephalexin] Hives 11/23/2014    Meclomen  11/23/2014    Meloxicam  11/23/2014    Metoclopramide Other (See Comments) 11/07/2015    Sulfa (sulfonamide antibiotics) Itching 11/23/2014       WOC Assessment Details/Treatment   Pt seen for wound care consultation.  Chart reviewed for this encounter.  See flow sheet for findings.     Pt in bed and agreeable to care. Family at the bedside. Healing skin tears to the right forearm and BLE - left open to air. Partial thickness skin loss to the right hand with a pink and moist wound bed. Foam dressing applied. Pt and pt's family educated on the wound care and the importance of turning every 2 hours.     Recommendations:  - Skin tears: bedside nursing to cleanse with NS, pat dry and apply a border foam dressing weekly  - Turning every 2 hours  - Chair cushion  - waffle mattress overlay      09/26/24 0848        Wound 09/02/24 2200 Skin Tear Right Hand   Date First Assessed/Time First Assessed: 09/02/24 2200   Present on Original Admission: Yes  Primary Wound Type: (c) Skin Tear   Side: Right  Location: Hand  Is this injury device related?: No  Pulses: +2 radial pulse   Wound Image    Dressing Appearance Intact;Moist drainage   Drainage Amount Small   Drainage Characteristics/Odor Serosanguineous   Appearance Pink;Moist   Tissue loss description Partial thickness   Periwound Area Intact;Dry;Pink   Wound Length (cm) 0.8 cm   Wound Width (cm) 1 cm   Wound Depth (cm) 0.1 cm   Wound Volume (cm^3) 0.08 cm^3   Wound Surface Area (cm^2) 0.8 cm^2   Care Cleansed with:;Sterile normal saline   Dressing Changed;Foam;Removed  (xeroform)        Wound 09/25/24 0010 Skin Tear Right distal;anterior Leg   Date First Assessed/Time First Assessed: 09/25/24 0010   Primary Wound Type: Skin Tear  Side: Right  Orientation: distal;anterior  Location: Leg   Wound Image    Dressing Appearance Open to air   Drainage Amount None   Appearance Intact;Pink;Dry   Tissue loss description Partial thickness   Periwound Area Intact;Dry   Care Cleansed with:;Sterile normal saline        Wound 08/31/24 1132 Skin Tear Right anterior;distal Arm #1   Date First Assessed/Time First Assessed: 08/31/24 1132   Primary Wound Type: Skin Tear  Side: Right  Orientation: anterior;distal  Location: Arm  Wound Approximate Age at First Assessment (Weeks): 1 weeks  Wound Number: #1  Is this injury device relat...   Dressing Appearance Open to air   Drainage Amount None   Appearance Intact;Dry;Red   Tissue loss description Partial thickness   Periwound Area Intact;Dry   Care Cleansed with:;Sterile normal saline         Recommendations made to primary team for above plan via secure chat. Wound care will sign off, re-consult as needed.     09/26/2024

## 2024-09-26 NOTE — ASSESSMENT & PLAN NOTE
- appears volume overload on exam  - , CXR w/ pulm edema  - start IV lasix 40mg BID  - repeat echo  - continue GDMT  - goal K>4, Mg>2  - monitor tele  - strict I/Os, daily weights  - GDMT - spironolactone 25 mg daily, carvedilol 6.25 mg BID, losartan 50 mg daily   Start jardiance 10 mg daily    9/26  Shortness of breath improved.  Bicarb of 31 this morning.  We will continue with Lasix 40 mg IV b.i.d..  Repeat echo shows normal EF however diastolic dysfunction.  We will discontinue Jardiance and spironolactone.  Continue Augmentin for UTI

## 2024-09-26 NOTE — PROGRESS NOTES
[facility] - Cardiology [facility] Medicine  Progress Note    Patient Name: Adriana Strong  MRN: [id number]  Patient Class: IP- Inpatient   Admission Date: 9/24/2024  Length of Stay: 2 days  Attending Physician: Ruddy Boyd MD  Primary Care Provider: Krzysztof Mcgraw MD        Subjective:     Principal Problem:Acute on chronic combined systolic and diastolic congestive heart failure        HPI:  Adriana Strong is a 79 y.o. female with a PMHx of CHF (EF 35%), Afib on elqiuis, s/p pacemaker, HLD, HTN, non obstructive CAD, s/p TAVR, asthma, COPD, on home oxygen who [place] [facility] [place] for CHF exacerbation. Daughter at bedside assists with history. Patient reports progressive worsening of shortness of breath and bilateral lower extremity swelling for the past few weeks. She has orthopnea and has to sleep upright on a wedge. She has ongoing issues with gait instability progressively worsening over the last year or so which seems to have worsen as walking is more difficult recently due to the shortness of breath. She had a fall a few weeks ago with head trauma and had a CTH done at that time which was unremarkable. No falls since. Patient's also had some mild memory issues of the last few months which daughter is trying to get into neurology to assess for possible early dementia. Denies fever, chills, chest pain, N/V, abdmonal pain, HA, vision changes or syncope. No increased O2 needs recently.     ED: hypertensive to /82, improved to SBP 150s s/p IV lasix. UA non infectious. , CXR shows mild congestive changes. Possible small left pleural effusion. Given lasix 60mg IVP.     Overview/Hospital Course:  Started on heart failure pathway with furosemide IV 40 mg BID. Continued on home GDMT - spironolactone 25 mg daily, carvedilol 6.25 mg BID, losartan 50 mg daily. Started jardiance 10 mg daily.    Interval History: Reports that shortness of breath has improved. Of note, she was recently  prescribed Augmentin for UTI as an outpatient, notably she complained of dysuria, which resolved only received 2 doses thus far, urinalysis here is unremarkable. She was prescribed Augmentin 1 g b.i.d. for 7 days.  Repeat echo shows normal EF and diastolic dysfunction    Review of Systems   Unable to perform ROS: Other     Objective:     Vital Signs (Most Recent):  Temp: 97.8 °F (36.6 °C) (09/26/24 1108)  Pulse: (!) 59 (09/26/24 1123)  Resp: 17 (09/26/24 1108)  BP: 134/60 (09/26/24 1108)  SpO2: 98 % (09/26/24 1108) Vital Signs (24h Range):  Temp:  [97.6 °F (36.4 °C)-98.3 °F (36.8 °C)] 97.8 °F (36.6 °C)  Pulse:  [53-60] 59  Resp:  [17-18] 17  SpO2:  [95 %-100 %] 98 %  BP: (128-155)/(58-76) 134/60     Weight: 93 kg (205 lb 0.4 oz)  Body mass index is 35.19 kg/m².    Intake/Output Summary (Last 24 hours) at 9/26/2024 1326  Last data filed at 9/26/2024 1200  Gross per 24 hour   Intake 582 ml   Output 1550 ml   Net -968 ml         Physical Exam  Constitutional:       General: She is not in acute distress.     Appearance: She is obese.   HENT:      Head: Normocephalic.      Right Ear: External ear normal.      Left Ear: External ear normal.      Nose: Nose normal.      Comments: Nasal cannula  Eyes:      General: No scleral icterus.  Neck:      Comments: JVD  Cardiovascular:      Rate and Rhythm: Normal rate.   Pulmonary:      Breath sounds: Wheezing present.   Abdominal:      Palpations: Abdomen is soft.      Tenderness: There is no abdominal tenderness.   Musculoskeletal:      Right lower leg: Edema present.      Left lower leg: Edema present.   Neurological:      General: No focal deficit present.      Mental Status: She is alert. Mental status is at baseline.             Significant Labs: All pertinent labs within the past 24 hours have been reviewed.      Assessment/Plan:      * Acute on chronic combined systolic and diastolic congestive heart failure  - appears volume overload on exam  - , CXR w/ pulm  edema  - start IV lasix 40mg BID  - repeat echo  - continue GDMT  - goal K>4, Mg>2  - monitor tele  - strict I/Os, daily weights  - GDMT - spironolactone 25 mg daily, carvedilol 6.25 mg BID, losartan 50 mg daily   Start jardiance 10 mg daily    9/26  Shortness of breath improved.  Bicarb of 31 this morning.  We will continue with Lasix 40 mg IV b.i.d..  Repeat echo shows normal EF however diastolic dysfunction.  We will discontinue Jardiance and spironolactone.  Continue Augmentin for UTI    Chronic respiratory failure with hypoxia  - stable on home 2L NC    History of transcatheter aortic valve replacement (TAVR)  - noted hx  - follow up echo    COPD (chronic obstructive pulmonary disease)  - no acute issues  - duonebs prn     Permanent atrial fibrillation  - continue coreg, eliquis     Essential hypertension  Chronic, uncontrolled. Latest blood pressure and vitals reviewed-     Temp:  [97.6 °F (36.4 °C)-98.3 °F (36.8 °C)]   Pulse:  [60]   Resp:  [18-25]   BP: (137-199)/(62-89)   SpO2:  [96 %-100 %] .   Home meds for hypertension were reviewed and noted below.   Hypertension Medications               carvediloL (COREG) 6.25 MG tablet Take 1 tablet (6.25 mg total) by mouth 2 (two) times daily with meals.    furosemide (LASIX) 40 MG tablet Take 1 tablet (40 mg total) by mouth 2 (two) times a day.    isosorbide mononitrate (IMDUR) 60 MG 24 hr tablet Take 1 tablet (60 mg total) by mouth once daily.    losartan (COZAAR) 50 MG Tab Take 1 tablet (50 mg total) by mouth once daily.    nitroGLYCERIN (NITROSTAT) 0.4 MG SL tablet Place 1 tablet (0.4 mg total) under the tongue every 5 (five) minutes as needed for Chest pain.    spironolactone (ALDACTONE) 25 MG tablet Take 1 tablet (25 mg total) by mouth once daily.            While in the hospital, will manage blood pressure as follows; Continue home antihypertensive regimen    Will utilize p.r.n. blood pressure medication only if patient's blood pressure greater than 180/110  and she develops symptoms such as worsening chest pain or shortness of breath.    Type 2 diabetes mellitus  - hold home metformin  - LDSSI, ACHS accuchecks  Lab Results   Component Value Date    HGBA1C 5.0 02/09/2024         VTE Risk Mitigation (From admission, onward)           Ordered     apixaban tablet 5 mg  2 times daily         09/24/24 2210     IP VTE HIGH RISK PATIENT  Once         09/24/24 2208     Place sequential compression device  Until discontinued         09/24/24 2208                    Discharge Planning   MIMI: 9/27/2024     Code Status: Full Code   Is the patient medically ready for discharge?:     Reason for patient still in hospital (select all that apply): Patient trending condition  Discharge Plan A: Home, Home with family                  Ruddy Boyd MD  Department of Hospital Medicine   [facility] - Cardiology Stepdown

## 2024-09-26 NOTE — PLAN OF CARE
Patient free of falls or injuries. Denied any pain or discomfort. Normal sinus and sinus bradycardia on cardiac monitor. SPO2 WNL with patient's at home baseline of 2 L supplemental oxygen nasal cannula. Diuresing with lasix IV push. No other events overnight. Plan of care reviewed with patient and daughter at bedside. All questions and concerns addressed.       Problem: Adult Inpatient Plan of Care  Goal: Plan of Care Review  Outcome: Progressing  Goal: Patient-Specific Goal (Individualized)  Outcome: Progressing  Goal: Absence of Hospital-Acquired Illness or Injury  Outcome: Progressing  Goal: Optimal Comfort and Wellbeing  Outcome: Progressing  Goal: Readiness for Transition of Care  Outcome: Progressing     Problem: Infection  Goal: Absence of Infection Signs and Symptoms  Outcome: Progressing     Problem: Diabetes Comorbidity  Goal: Blood Glucose Level Within Targeted Range  Outcome: Progressing     Problem: Acute Kidney Injury/Impairment  Goal: Fluid and Electrolyte Balance  Outcome: Progressing  Goal: Improved Oral Intake  Outcome: Progressing  Goal: Effective Renal Function  Outcome: Progressing     Problem: Wound  Goal: Optimal Coping  Outcome: Progressing  Goal: Optimal Functional Ability  Outcome: Progressing  Goal: Absence of Infection Signs and Symptoms  Outcome: Progressing  Goal: Improved Oral Intake  Outcome: Progressing  Goal: Optimal Pain Control and Function  Outcome: Progressing  Goal: Skin Health and Integrity  Outcome: Progressing  Goal: Optimal Wound Healing  Outcome: Progressing     Problem: Fall Injury Risk  Goal: Absence of Fall and Fall-Related Injury  Outcome: Progressing

## 2024-09-26 NOTE — HOSPITAL COURSE
Started on heart failure pathway with furosemide IV 40 mg BID. Continued on home GDMT - spironolactone 25 mg daily, carvedilol 6.25 mg BID, losartan 50 mg daily. Started jardiance 10 mg daily.

## 2024-09-27 LAB
ANION GAP SERPL CALC-SCNC: 10 MMOL/L (ref 8–16)
BASOPHILS # BLD AUTO: 0.04 K/UL (ref 0–0.2)
BASOPHILS NFR BLD: 0.5 % (ref 0–1.9)
BUN SERPL-MCNC: 23 MG/DL (ref 8–23)
CALCIUM SERPL-MCNC: 9.3 MG/DL (ref 8.7–10.5)
CHLORIDE SERPL-SCNC: 97 MMOL/L (ref 95–110)
CO2 SERPL-SCNC: 31 MMOL/L (ref 23–29)
CREAT SERPL-MCNC: 0.8 MG/DL (ref 0.5–1.4)
DIFFERENTIAL METHOD BLD: ABNORMAL
EOSINOPHIL # BLD AUTO: 0 K/UL (ref 0–0.5)
EOSINOPHIL NFR BLD: 0.5 % (ref 0–8)
ERYTHROCYTE [DISTWIDTH] IN BLOOD BY AUTOMATED COUNT: 16 % (ref 11.5–14.5)
EST. GFR  (NO RACE VARIABLE): >60 ML/MIN/1.73 M^2
GLUCOSE SERPL-MCNC: 103 MG/DL (ref 70–110)
HCT VFR BLD AUTO: 31.1 % (ref 37–48.5)
HGB BLD-MCNC: 9.9 G/DL (ref 12–16)
IMM GRANULOCYTES # BLD AUTO: 0.03 K/UL (ref 0–0.04)
IMM GRANULOCYTES NFR BLD AUTO: 0.4 % (ref 0–0.5)
LYMPHOCYTES # BLD AUTO: 1.4 K/UL (ref 1–4.8)
LYMPHOCYTES NFR BLD: 19.1 % (ref 18–48)
MAGNESIUM SERPL-MCNC: 1.9 MG/DL (ref 1.6–2.6)
MCH RBC QN AUTO: 29.4 PG (ref 27–31)
MCHC RBC AUTO-ENTMCNC: 31.8 G/DL (ref 32–36)
MCV RBC AUTO: 92 FL (ref 82–98)
MONOCYTES # BLD AUTO: 1.1 K/UL (ref 0.3–1)
MONOCYTES NFR BLD: 15 % (ref 4–15)
NEUTROPHILS # BLD AUTO: 4.7 K/UL (ref 1.8–7.7)
NEUTROPHILS NFR BLD: 64.5 % (ref 38–73)
NRBC BLD-RTO: 0 /100 WBC
PLATELET # BLD AUTO: 240 K/UL (ref 150–450)
PMV BLD AUTO: 10.2 FL (ref 9.2–12.9)
POCT GLUCOSE: 119 MG/DL (ref 70–110)
POCT GLUCOSE: 119 MG/DL (ref 70–110)
POCT GLUCOSE: 120 MG/DL (ref 70–110)
POCT GLUCOSE: 121 MG/DL (ref 70–110)
POTASSIUM SERPL-SCNC: 4.3 MMOL/L (ref 3.5–5.1)
RBC # BLD AUTO: 3.37 M/UL (ref 4–5.4)
SODIUM SERPL-SCNC: 138 MMOL/L (ref 136–145)
WBC # BLD AUTO: 7.29 K/UL (ref 3.9–12.7)

## 2024-09-27 PROCEDURE — 25000003 PHARM REV CODE 250: Performed by: PHYSICIAN ASSISTANT

## 2024-09-27 PROCEDURE — 83735 ASSAY OF MAGNESIUM: CPT | Performed by: PHYSICIAN ASSISTANT

## 2024-09-27 PROCEDURE — 80048 BASIC METABOLIC PNL TOTAL CA: CPT | Performed by: PHYSICIAN ASSISTANT

## 2024-09-27 PROCEDURE — 25000003 PHARM REV CODE 250: Performed by: INTERNAL MEDICINE

## 2024-09-27 PROCEDURE — 20600001 HC STEP DOWN PRIVATE ROOM

## 2024-09-27 PROCEDURE — 63600175 PHARM REV CODE 636 W HCPCS: Performed by: INTERNAL MEDICINE

## 2024-09-27 PROCEDURE — 36415 COLL VENOUS BLD VENIPUNCTURE: CPT | Performed by: PHYSICIAN ASSISTANT

## 2024-09-27 PROCEDURE — 85025 COMPLETE CBC W/AUTO DIFF WBC: CPT | Performed by: PHYSICIAN ASSISTANT

## 2024-09-27 RX ADMIN — ISOSORBIDE MONONITRATE 60 MG: 60 TABLET, EXTENDED RELEASE ORAL at 08:09

## 2024-09-27 RX ADMIN — LORAZEPAM 0.5 MG: 0.5 TABLET ORAL at 08:09

## 2024-09-27 RX ADMIN — MONTELUKAST 10 MG: 10 TABLET, FILM COATED ORAL at 08:09

## 2024-09-27 RX ADMIN — FUROSEMIDE 40 MG: 10 INJECTION, SOLUTION INTRAVENOUS at 08:09

## 2024-09-27 RX ADMIN — APIXABAN 5 MG: 5 TABLET, FILM COATED ORAL at 09:09

## 2024-09-27 RX ADMIN — ATORVASTATIN CALCIUM 10 MG: 10 TABLET, FILM COATED ORAL at 08:09

## 2024-09-27 RX ADMIN — AMOXICILLIN AND CLAVULANATE POTASSIUM 1 TABLET: 875; 125 TABLET, FILM COATED ORAL at 08:09

## 2024-09-27 RX ADMIN — PANTOPRAZOLE SODIUM 40 MG: 40 TABLET, DELAYED RELEASE ORAL at 08:09

## 2024-09-27 RX ADMIN — GABAPENTIN 600 MG: 300 CAPSULE ORAL at 09:09

## 2024-09-27 RX ADMIN — LOSARTAN POTASSIUM 50 MG: 50 TABLET, FILM COATED ORAL at 08:09

## 2024-09-27 RX ADMIN — GABAPENTIN 600 MG: 300 CAPSULE ORAL at 02:09

## 2024-09-27 RX ADMIN — DULOXETINE HYDROCHLORIDE 20 MG: 20 CAPSULE, DELAYED RELEASE ORAL at 08:09

## 2024-09-27 RX ADMIN — CARVEDILOL 6.25 MG: 6.25 TABLET, FILM COATED ORAL at 08:09

## 2024-09-27 RX ADMIN — RANOLAZINE 1000 MG: 500 TABLET, EXTENDED RELEASE ORAL at 08:09

## 2024-09-27 RX ADMIN — APIXABAN 5 MG: 5 TABLET, FILM COATED ORAL at 08:09

## 2024-09-27 RX ADMIN — RANOLAZINE 1000 MG: 500 TABLET, EXTENDED RELEASE ORAL at 09:09

## 2024-09-27 RX ADMIN — CLOPIDOGREL BISULFATE 75 MG: 75 TABLET ORAL at 08:09

## 2024-09-27 RX ADMIN — GABAPENTIN 600 MG: 300 CAPSULE ORAL at 08:09

## 2024-09-27 RX ADMIN — FUROSEMIDE 40 MG: 10 INJECTION, SOLUTION INTRAVENOUS at 09:09

## 2024-09-27 RX ADMIN — AMOXICILLIN AND CLAVULANATE POTASSIUM 1 TABLET: 875; 125 TABLET, FILM COATED ORAL at 09:09

## 2024-09-27 RX ADMIN — CARVEDILOL 6.25 MG: 6.25 TABLET, FILM COATED ORAL at 04:09

## 2024-09-27 NOTE — ASSESSMENT & PLAN NOTE
- appears volume overload on exam  - , CXR w/ pulm edema  - start IV lasix 40mg BID  - repeat echo  - continue GDMT  - goal K>4, Mg>2  - monitor tele  - strict I/Os, daily weights  - GDMT - spironolactone 25 mg daily, carvedilol 6.25 mg BID, losartan 50 mg daily   Start jardiance 10 mg daily    9/26  Shortness of breath improved.  Bicarb of 31 this morning.  We will continue with Lasix 40 mg IV b.i.d..  Repeat echo shows normal EF however diastolic dysfunction.  We will discontinue Jardiance and spironolactone.  Continue Augmentin for UTI  9/27  Uncertain if patient is truly at baseline.  We will try and ambulate her with the nursing staff today and assess, she still has significant findings on auscultation.  We will continue with current IV Lasix for now

## 2024-09-27 NOTE — NURSING
Nurses Note -- 4 Eyes      9/27/2024   9:37 AM      Skin assessed during: Q Shift Change      [] No Altered Skin Integrity Present    []Prevention Measures Documented      [x] Yes- Altered Skin Integrity Present or Discovered   [] LDA Added if Not in Epic (Describe Wound)   [x] New Altered Skin Integrity was Present on Admit and Documented in LDA   [] Wound Image Taken    Wound Care Consulted? No    Attending Nurse:  Dex Gutiérrez RN/Staff Member:  Tyra

## 2024-09-27 NOTE — PROGRESS NOTES
[facility] - Cardiology [facility] Medicine  Progress Note    Patient Name: Adriana Strong  MRN: [id number]  Patient Class: IP- Inpatient   Admission Date: 9/24/2024  Length of Stay: 3 days  Attending Physician: Ruddy Boyd MD  Primary Care Provider: Krzysztof Mcgraw MD        Subjective:     Principal Problem:Acute on chronic combined systolic and diastolic congestive heart failure        HPI:  Adriana Strong is a 79 y.o. female with a PMHx of CHF (EF 35%), Afib on elqiuis, s/p pacemaker, HLD, HTN, non obstructive CAD, s/p TAVR, asthma, COPD, on home oxygen who [place] to [facility] [place]ology clinic for CHF exacerbation. Daughter at bedside assists with history. Patient reports progressive worsening of shortness of breath and bilateral lower extremity swelling for the past few weeks. She has orthopnea and has to sleep upright on a wedge. She has ongoing issues with gait instability progressively worsening over the last year or so which seems to have worsen as walking is more difficult recently due to the shortness of breath. She had a fall a few weeks ago with head trauma and had a CTH done at that time which was unremarkable. No falls since. Patient's also had some mild memory issues of the last few months which daughter is trying to get into neurology to assess for possible early dementia. Denies fever, chills, chest pain, N/V, abdmonal pain, HA, vision changes or syncope. No increased O2 needs recently.     ED: hypertensive to /82, improved to SBP 150s s/p IV lasix. UA non infectious. , CXR shows mild congestive changes. Possible small left pleural effusion. Given lasix 60mg IVP.     Overview/Hospital Course:  Started on heart failure pathway with furosemide IV 40 mg BID. Continued on home GDMT - spironolactone 25 mg daily, carvedilol 6.25 mg BID, losartan 50 mg daily. Started jardiance 10 mg daily.    Interval History:  Bicarb remains at 31, urine output noted.  Shortness of breath improved,  though not much ambulation since she has been hospitalized    Review of Systems  Objective:     Vital Signs (Most Recent):  Temp: 97.3 °F (36.3 °C) (09/27/24 0425)  Pulse: 60 (09/27/24 0425)  Resp: 18 (09/27/24 0425)  BP: (!) 146/65 (09/27/24 0425)  SpO2: 100 % (09/27/24 0425) Vital Signs (24h Range):  Temp:  [97.3 °F (36.3 °C)-98.3 °F (36.8 °C)] 97.3 °F (36.3 °C)  Pulse:  [58-60] 60  Resp:  [17-18] 18  SpO2:  [98 %-100 %] 100 %  BP: (129-165)/(60-74) 146/65     Weight: 92.4 kg (203 lb 11.3 oz)  Body mass index is 34.97 kg/m².    Intake/Output Summary (Last 24 hours) at 9/27/2024 0824  Last data filed at 9/26/2024 2241  Gross per 24 hour   Intake 863 ml   Output 1700 ml   Net -837 ml           Physical Exam  Constitutional:       General: She is not in acute distress.     Appearance: She is obese.   HENT:      Head: Normocephalic.      Right Ear: External ear normal.      Left Ear: External ear normal.      Nose: Nose normal.      Comments: Nasal cannula  Eyes:      General: No scleral icterus.  Cardiovascular:      Rate and Rhythm: Normal rate.   Pulmonary:      Breath sounds: Wheezing and rales present.   Abdominal:      Palpations: Abdomen is soft.      Tenderness: There is no abdominal tenderness.   Musculoskeletal:      Right lower leg: Edema present.      Left lower leg: Edema present.   Neurological:      General: No focal deficit present.      Mental Status: She is alert. Mental status is at baseline.            Significant Labs: All pertinent labs within the past 24 hours have been reviewed.      Assessment/Plan:      * Acute on chronic combined systolic and diastolic congestive heart failure  - appears volume overload on exam  - , CXR w/ pulm edema  - start IV lasix 40mg BID  - repeat echo  - continue GDMT  - goal K>4, Mg>2  - monitor tele  - strict I/Os, daily weights  - GDMT - spironolactone 25 mg daily, carvedilol 6.25 mg BID, losartan 50 mg daily   Start jardiance 10 mg daily    9/26  Shortness  of breath improved.  Bicarb of 31 this morning.  We will continue with Lasix 40 mg IV b.i.d..  Repeat echo shows normal EF however diastolic dysfunction.  We will discontinue Jardiance and spironolactone.  Continue Augmentin for UTI  9/27  Uncertain if patient is truly at baseline.  We will try and ambulate her with the nursing staff today and assess, she still has significant findings on auscultation.  We will continue with current IV Lasix for now    Chronic respiratory failure with hypoxia  - stable on home 2L NC    History of transcatheter aortic valve replacement (TAVR)  - noted hx  - follow up echo    COPD (chronic obstructive pulmonary disease)  - no acute issues  - duonebs prn     Permanent atrial fibrillation  - continue coreg, eliquis     Essential hypertension  Chronic, uncontrolled. Latest blood pressure and vitals reviewed-     Temp:  [97.6 °F (36.4 °C)-98.3 °F (36.8 °C)]   Pulse:  [60]   Resp:  [18-25]   BP: (137-199)/(62-89)   SpO2:  [96 %-100 %] .   Home meds for hypertension were reviewed and noted below.   Hypertension Medications               carvediloL (COREG) 6.25 MG tablet Take 1 tablet (6.25 mg total) by mouth 2 (two) times daily with meals.    furosemide (LASIX) 40 MG tablet Take 1 tablet (40 mg total) by mouth 2 (two) times a day.    isosorbide mononitrate (IMDUR) 60 MG 24 hr tablet Take 1 tablet (60 mg total) by mouth once daily.    losartan (COZAAR) 50 MG Tab Take 1 tablet (50 mg total) by mouth once daily.    nitroGLYCERIN (NITROSTAT) 0.4 MG SL tablet Place 1 tablet (0.4 mg total) under the tongue every 5 (five) minutes as needed for Chest pain.    spironolactone (ALDACTONE) 25 MG tablet Take 1 tablet (25 mg total) by mouth once daily.            While in the hospital, will manage blood pressure as follows; Continue home antihypertensive regimen    Will utilize p.r.n. blood pressure medication only if patient's blood pressure greater than 180/110 and she develops symptoms such as  worsening chest pain or shortness of breath.    Type 2 diabetes mellitus  - hold home metformin  - LDSSI, ACHS accuchecks  Lab Results   Component Value Date    HGBA1C 5.0 02/09/2024         VTE Risk Mitigation (From admission, onward)           Ordered     apixaban tablet 5 mg  2 times daily         09/24/24 2210     IP VTE HIGH RISK PATIENT  Once         09/24/24 2208     Place sequential compression device  Until discontinued         09/24/24 2208                    Discharge Planning   MIMI: 9/27/2024     Code Status: Full Code   Is the patient medically ready for discharge?:     Reason for patient still in hospital (select all that apply): Patient trending condition  Discharge Plan A: Home, Home with family                  Ruddy Boyd MD  Department of Hospital Medicine   [facility] - Cardiology Stepdown

## 2024-09-27 NOTE — SUBJECTIVE & OBJECTIVE
Interval History:  Bicarb remains at 31, urine output noted.  Shortness of breath improved, though not much ambulation since she has been hospitalized    Review of Systems  Objective:     Vital Signs (Most Recent):  Temp: 97.3 °F (36.3 °C) (09/27/24 0425)  Pulse: 60 (09/27/24 0425)  Resp: 18 (09/27/24 0425)  BP: (!) 146/65 (09/27/24 0425)  SpO2: 100 % (09/27/24 0425) Vital Signs (24h Range):  Temp:  [97.3 °F (36.3 °C)-98.3 °F (36.8 °C)] 97.3 °F (36.3 °C)  Pulse:  [58-60] 60  Resp:  [17-18] 18  SpO2:  [98 %-100 %] 100 %  BP: (129-165)/(60-74) 146/65     Weight: 92.4 kg (203 lb 11.3 oz)  Body mass index is 34.97 kg/m².    Intake/Output Summary (Last 24 hours) at 9/27/2024 0824  Last data filed at 9/26/2024 2241  Gross per 24 hour   Intake 863 ml   Output 1700 ml   Net -837 ml           Physical Exam  Constitutional:       General: She is not in acute distress.     Appearance: She is obese.   HENT:      Head: Normocephalic.      Right Ear: External ear normal.      Left Ear: External ear normal.      Nose: Nose normal.      Comments: Nasal cannula  Eyes:      General: No scleral icterus.  Cardiovascular:      Rate and Rhythm: Normal rate.   Pulmonary:      Breath sounds: Wheezing and rales present.   Abdominal:      Palpations: Abdomen is soft.      Tenderness: There is no abdominal tenderness.   Musculoskeletal:      Right lower leg: Edema present.      Left lower leg: Edema present.   Neurological:      General: No focal deficit present.      Mental Status: She is alert. Mental status is at baseline.            Significant Labs: All pertinent labs within the past 24 hours have been reviewed.

## 2024-09-27 NOTE — PLAN OF CARE
Plan of care discussed with patient. Patient is free of fall/trauma/injury. Denies CP, or pain/discomfort. Currently on 2L NC( home dose) with [id number]=628%. Diuresing with IVP lasix. PRN lorazepam given this AM. All questions addressed. Will continue to monitor

## 2024-09-28 VITALS
HEIGHT: 64 IN | SYSTOLIC BLOOD PRESSURE: 150 MMHG | TEMPERATURE: 99 F | OXYGEN SATURATION: 100 % | RESPIRATION RATE: 19 BRPM | DIASTOLIC BLOOD PRESSURE: 82 MMHG | WEIGHT: 199.75 LBS | BODY MASS INDEX: 34.1 KG/M2 | HEART RATE: 60 BPM

## 2024-09-28 LAB
ANION GAP SERPL CALC-SCNC: 9 MMOL/L (ref 8–16)
BUN SERPL-MCNC: 25 MG/DL (ref 8–23)
CALCIUM SERPL-MCNC: 9.4 MG/DL (ref 8.7–10.5)
CHLORIDE SERPL-SCNC: 94 MMOL/L (ref 95–110)
CO2 SERPL-SCNC: 34 MMOL/L (ref 23–29)
CREAT SERPL-MCNC: 0.7 MG/DL (ref 0.5–1.4)
EST. GFR  (NO RACE VARIABLE): >60 ML/MIN/1.73 M^2
GLUCOSE SERPL-MCNC: 107 MG/DL (ref 70–110)
MAGNESIUM SERPL-MCNC: 1.8 MG/DL (ref 1.6–2.6)
POCT GLUCOSE: 122 MG/DL (ref 70–110)
POCT GLUCOSE: 154 MG/DL (ref 70–110)
POTASSIUM SERPL-SCNC: 4.1 MMOL/L (ref 3.5–5.1)
SODIUM SERPL-SCNC: 137 MMOL/L (ref 136–145)

## 2024-09-28 PROCEDURE — 25000003 PHARM REV CODE 250: Performed by: PHYSICIAN ASSISTANT

## 2024-09-28 PROCEDURE — 25000003 PHARM REV CODE 250: Performed by: INTERNAL MEDICINE

## 2024-09-28 PROCEDURE — 63600175 PHARM REV CODE 636 W HCPCS: Performed by: INTERNAL MEDICINE

## 2024-09-28 PROCEDURE — 80048 BASIC METABOLIC PNL TOTAL CA: CPT | Performed by: PHYSICIAN ASSISTANT

## 2024-09-28 PROCEDURE — 36415 COLL VENOUS BLD VENIPUNCTURE: CPT | Performed by: PHYSICIAN ASSISTANT

## 2024-09-28 PROCEDURE — 83735 ASSAY OF MAGNESIUM: CPT | Performed by: PHYSICIAN ASSISTANT

## 2024-09-28 RX ORDER — AMOXICILLIN AND CLAVULANATE POTASSIUM 875; 125 MG/1; MG/1
1 TABLET, FILM COATED ORAL EVERY 12 HOURS
Status: ON HOLD
Start: 2024-09-28 | End: 2024-10-04 | Stop reason: HOSPADM

## 2024-09-28 RX ORDER — FUROSEMIDE 40 MG/1
40 TABLET ORAL 2 TIMES DAILY
Start: 2024-09-29 | End: 2025-09-29

## 2024-09-28 RX ORDER — HYDRALAZINE HYDROCHLORIDE 20 MG/ML
5 INJECTION INTRAMUSCULAR; INTRAVENOUS EVERY 8 HOURS PRN
Status: DISCONTINUED | OUTPATIENT
Start: 2024-09-28 | End: 2024-09-28 | Stop reason: HOSPADM

## 2024-09-28 RX ADMIN — CLOPIDOGREL BISULFATE 75 MG: 75 TABLET ORAL at 09:09

## 2024-09-28 RX ADMIN — ISOSORBIDE MONONITRATE 60 MG: 60 TABLET, EXTENDED RELEASE ORAL at 09:09

## 2024-09-28 RX ADMIN — AMOXICILLIN AND CLAVULANATE POTASSIUM 1 TABLET: 875; 125 TABLET, FILM COATED ORAL at 09:09

## 2024-09-28 RX ADMIN — GABAPENTIN 600 MG: 300 CAPSULE ORAL at 09:09

## 2024-09-28 RX ADMIN — LOSARTAN POTASSIUM 50 MG: 50 TABLET, FILM COATED ORAL at 09:09

## 2024-09-28 RX ADMIN — MONTELUKAST 10 MG: 10 TABLET, FILM COATED ORAL at 09:09

## 2024-09-28 RX ADMIN — PANTOPRAZOLE SODIUM 40 MG: 40 TABLET, DELAYED RELEASE ORAL at 09:09

## 2024-09-28 RX ADMIN — CARVEDILOL 6.25 MG: 6.25 TABLET, FILM COATED ORAL at 09:09

## 2024-09-28 RX ADMIN — DULOXETINE HYDROCHLORIDE 20 MG: 20 CAPSULE, DELAYED RELEASE ORAL at 09:09

## 2024-09-28 RX ADMIN — RANOLAZINE 1000 MG: 500 TABLET, EXTENDED RELEASE ORAL at 09:09

## 2024-09-28 RX ADMIN — ATORVASTATIN CALCIUM 10 MG: 10 TABLET, FILM COATED ORAL at 09:09

## 2024-09-28 RX ADMIN — APIXABAN 5 MG: 5 TABLET, FILM COATED ORAL at 09:09

## 2024-09-28 RX ADMIN — FUROSEMIDE 40 MG: 10 INJECTION, SOLUTION INTRAVENOUS at 09:09

## 2024-09-28 NOTE — NURSING
Patient is ready for discharge. Patient stable alert and oriented. IVs removed. No complaints of pain. Discussed discharge plan with patient and spouse. Reviewed medications, appointments, and answered questions with patient and family. Patient left via wheelchair

## 2024-09-28 NOTE — DISCHARGE SUMMARY
[facility] - Cardiology [facility] Medicine  Discharge Summary      Patient Name: Adriana Strong  MRN: [id number]  Admission Date: 9/24/2024  Hospital Length of Stay: 4 days  Discharge Date and Time:  09/28/2024 10:39 AM  Attending Physician: Ruddy Boyd MD   Discharging Provider: Ruddy Boyd MD  Discharge Provider Team: [facility]  Primary Care Provider: Krzysztof Mcgraw MD        HPI: Adriana Strong is a 79 y.o. female with a PMHx of CHF (EF 35%), Afib on elqiuis, s/p pacemaker, HLD, HTN, non obstructive CAD, s/p TAVR, asthma, COPD, on home oxygen who [place] to [facility] [place] cardiology clinic for CHF exacerbation. Daughter at bedside assists with history. Patient reports progressive worsening of shortness of breath and bilateral lower extremity swelling for the past few weeks. She has orthopnea and has to sleep upright on a wedge. She has ongoing issues with gait instability progressively worsening over the last year or so which seems to have worsen as walking is more difficult recently due to the shortness of breath. She had a fall a few weeks ago with head trauma and had a CTH done at that time which was unremarkable. No falls since. Patient's also had some mild memory issues of the last few months which daughter is trying to get into neurology to assess for possible early dementia. Denies fever, chills, chest pain, N/V, abdmonal pain, HA, vision changes or syncope. No increased O2 needs recently.     ED: hypertensive to /82, improved to SBP 150s s/p IV lasix. UA non infectious. , CXR shows mild congestive changes. Possible small left pleural effusion. Given lasix 60mg IVP.     * No surgery found *      Hospital Course:  Patient was admitted and managed for decompensated heart failure.  She had repeat echo which showed normal EF however significant diastolic dysfunction, her Jardaince and spironolactone were discontinued.  She was adequately diuresed to baseline and was eager for discharge,  she was stable on 2 L nasal cannula home oxygen.  She was resumed on Augmentin for recently diagnosed outpatient UTI.  She had contraction alkalosis and her Lasix is to be resumed 1 day after discharge with repeat BMP in 3 days and primary care follow up.  Cardiology referral given for sooner follow up after discharge..      Physical Exam  Constitutional:       General: She is not in acute distress.     Appearance: She is obese.   HENT:      Head: Normocephalic.      Right Ear: External ear normal.      Left Ear: External ear normal.      Nose: Nose normal.      Comments: Nasal cannula  Eyes:      General: No scleral icterus.  Cardiovascular:      Rate and Rhythm: Normal rate.   Pulmonary:      Breath sounds: Wheezing and rales present.   Abdominal:      Palpations: Abdomen is soft.      Tenderness: There is no abdominal tenderness.   Musculoskeletal:      Right lower leg: Edema present.      Left lower leg: Edema present.   Neurological:      General: No focal deficit present.      Mental Status: She is alert. Mental status is at baseline.        Consults:   Consults (From admission, onward)          Status Ordering Provider     Inpatient consult to Social Work/Case Management  Once        Provider:  (Not yet assigned)    Acknowledged CAMDEN RICHARDSON     Inpatient consult to Registered Dietitian/Nutritionist  Once        Provider:  (Not yet assigned)    Completed CAMDEN RICHARDSON            Final Active Diagnoses:    Diagnosis Date Noted POA    PRINCIPAL PROBLEM:  Acute on chronic combined systolic and diastolic congestive heart failure [I50.43] 09/30/2022 Yes    Chronic respiratory failure with hypoxia [J96.11] 09/24/2024 Yes    History of transcatheter aortic valve replacement (TAVR) [Z95.2] 12/08/2020 Not Applicable    Atrial flutter [I48.92] 05/15/2020 Yes    COPD (chronic obstructive pulmonary disease) [J44.9] 06/13/2017 Yes    Permanent atrial fibrillation [I48.21] 07/08/2016 Yes    Essential  hypertension [I10] 04/08/2016 Yes    Type 2 diabetes mellitus [E11.9] 11/24/2014 Yes      Problems Resolved During this Admission:      Discharged Condition: fair    Disposition: Home or Self Care    Follow Up:   Follow-up Information       Krzysztof Mcgraw MD Follow up in 3 day(s).    Specialty: Family Medicine  Contact information:  [address]  [address]  [place] [id number]  [phone]                           Patient Instructions:      BASIC METABOLIC PANEL   Standing Status: Future Standing Exp. Date: 12/27/25     Order Specific Question Answer Comments   Send normal result to authorizing provider's In Basket if patient is active on MyChart: Yes      Ambulatory referral/consult to Cardiology   Standing Status: Future   Referral Priority: Routine Referral Type: Consultation   Referral Reason: Specialty Services Required   Requested Specialty: Cardiology   Number of Visits Requested: 1     Medications:  Reconciled Home Medications:      Medication List        START taking these medications      amoxicillin-clavulanate 875-125mg 875-125 mg per tablet  Commonly known as: AUGMENTIN  Take 1 tablet by mouth every 12 (twelve) hours. for 3 days            CONTINUE taking these medications      acetaminophen 650 MG Tbsr  Commonly known as: TYLENOL  Take 1,300 mg by mouth 2 (two) times daily as needed.     albuterol-ipratropium 2.5 mg-0.5 mg/3 mL nebulizer solution  Commonly known as: DUO-NEB  Take 3 mLs by nebulization every 4 (four) hours as needed for Wheezing or Shortness of Breath.     apixaban 5 mg Tab  Commonly known as: ELIQUIS  Take 1 tablet (5 mg total) by mouth 2 (two) times daily.     ascorbic acid (vitamin C) 500 MG tablet  Commonly known as: VITAMIN C  Take 1,000 mg by mouth once daily.     atorvastatin 10 MG tablet  Commonly known as: LIPITOR  Take 1 tablet (10 mg total) by mouth once daily.     CALTRATE 600 + D ORAL  Take 1 tablet by mouth once daily.     carvediloL 6.25 MG tablet  Commonly known as:  COREG  Take 1 tablet (6.25 mg total) by mouth 2 (two) times daily with meals.     clopidogreL 75 mg tablet  Commonly known as: PLAVIX  Take 1 tablet (75 mg total) by mouth once daily.     DULoxetine 20 MG capsule  Commonly known as: CYMBALTA  Take 1 capsule (20 mg total) by mouth once daily.     estradioL 0.01 % (0.1 mg/gram) vaginal cream  Commonly known as: ESTRACE  Place vaginally.     fexofenadine 60 MG tablet  Commonly known as: ALLEGRA  Take 60 mg by mouth once daily.     furosemide 40 MG tablet  Commonly known as: LASIX  Take 1 tablet (40 mg total) by mouth 2 (two) times a day.  Start taking on: September 29, 2024     gabapentin 600 MG tablet  Commonly known as: NEURONTIN  Take 600 mg by mouth 3 (three) times daily.     INV losartan 50 MG Tab  Commonly known as: COZAAR  Take 1 tablet (50 mg total) by mouth once daily.     isosorbide mononitrate 60 MG 24 hr tablet  Commonly known as: IMDUR  Take 1 tablet (60 mg total) by mouth once daily.     LORazepam 0.5 MG tablet  Commonly known as: ATIVAN  Take 1 tablet (0.5 mg total) by mouth every morning.     metFORMIN 500 MG tablet  Commonly known as: GLUCOPHAGE  Take 1 tablet (500 mg total) by mouth 2 (two) times daily.     montelukast 10 mg tablet  Commonly known as: SINGULAIR  Take 10 mg by mouth once daily.     pantoprazole 40 MG tablet  Commonly known as: PROTONIX  Take 1 tablet (40 mg total) by mouth once daily.     ranolazine 1,000 mg Tb12  Commonly known as: RANEXA  Take 1 tablet (1,000 mg total) by mouth 2 (two) times daily.            STOP taking these medications      cimetidine 300 MG tablet  Commonly known as: TAGAMET     diphenhydrAMINE 25 mg capsule  Commonly known as: BenadryL     empagliflozin 10 mg tablet  Commonly known as: Jardiance     fluconazole 150 MG Tab  Commonly known as: DIFLUCAN     mupirocin 2 % ointment  Commonly known as: BACTROBAN     nitroGLYCERIN 0.4 MG SL tablet  Commonly known as: NITROSTAT     predniSONE 50 MG Tab  Commonly known  as: DELTASONE     spironolactone 25 MG tablet  Commonly known as: ALDACTONE     SYMBICORT 160-4.5 mcg/actuation Hfaa  Generic drug: budesonide-formoterol 160-4.5 mcg     WOMEN'S 50 PLUS MULTIVITAMIN ORAL            ASK your doctor about these medications      HYDROcodone-acetaminophen 5-325 mg per tablet  Commonly known as: NORCO  Take 1 tablet by mouth every 4 (four) hours as needed for Pain.                  Pending Diagnostic Studies:       None          Indwelling Lines/Drains at time of discharge:   Lines/Drains/Airways       Drain  Duration             Female External Urinary Catheter w/ Suction 09/24/24 2101 3 days                    Time spent on the discharge of patient: 40 minutes         Ruddy Boyd MD  Department of Hospital Medicine  [facility] - Cardiology Stepdown

## 2024-09-29 ENCOUNTER — CLINICAL SUPPORT (OUTPATIENT)
Dept: CARDIOLOGY | Facility: HOSPITAL | Age: 79
DRG: 682 | End: 2024-09-29
Attending: INTERNAL MEDICINE
Payer: MEDICARE

## 2024-09-29 ENCOUNTER — PATIENT MESSAGE (OUTPATIENT)
Dept: CARDIOLOGY | Facility: CLINIC | Age: 79
End: 2024-09-29
Payer: MEDICARE

## 2024-09-29 ENCOUNTER — HOSPITAL ENCOUNTER (INPATIENT)
Facility: HOSPITAL | Age: 79
LOS: 4 days | Discharge: HOME HEALTH | DRG: 682 | End: 2024-10-05
Attending: EMERGENCY MEDICINE | Admitting: INTERNAL MEDICINE
Payer: MEDICARE

## 2024-09-29 DIAGNOSIS — R57.1 HYPOVOLEMIC SHOCK: ICD-10-CM

## 2024-09-29 DIAGNOSIS — R07.9 CHEST PAIN: ICD-10-CM

## 2024-09-29 DIAGNOSIS — I44.2 ATRIOVENTRICULAR BLOCK, COMPLETE: ICD-10-CM

## 2024-09-29 DIAGNOSIS — J96.10 CHRONIC RESPIRATORY FAILURE, UNSPECIFIED WHETHER WITH HYPOXIA OR HYPERCAPNIA: ICD-10-CM

## 2024-09-29 DIAGNOSIS — E87.1 HYPONATREMIA: Primary | ICD-10-CM

## 2024-09-29 DIAGNOSIS — N17.9 ACUTE KIDNEY INJURY: ICD-10-CM

## 2024-09-29 DIAGNOSIS — Z95.0 PRESENCE OF CARDIAC PACEMAKER: ICD-10-CM

## 2024-09-29 DIAGNOSIS — D53.9 MACROCYTIC ANEMIA: ICD-10-CM

## 2024-09-29 DIAGNOSIS — I27.20 PULMONARY HYPERTENSION: ICD-10-CM

## 2024-09-29 DIAGNOSIS — R53.1 WEAKNESS: ICD-10-CM

## 2024-09-29 DIAGNOSIS — I50.32 CHRONIC HEART FAILURE WITH PRESERVED EJECTION FRACTION: ICD-10-CM

## 2024-09-29 DIAGNOSIS — N17.9 AKI (ACUTE KIDNEY INJURY): ICD-10-CM

## 2024-09-29 PROBLEM — M54.50 LOW BACK PAIN: Status: RESOLVED | Noted: 2019-06-06 | Resolved: 2024-09-29

## 2024-09-29 PROBLEM — D72.829 LEUKOCYTOSIS: Status: RESOLVED | Noted: 2020-10-19 | Resolved: 2024-09-29

## 2024-09-29 PROBLEM — R20.2 NUMBNESS AND TINGLING: Status: RESOLVED | Noted: 2021-12-13 | Resolved: 2024-09-29

## 2024-09-29 PROBLEM — R07.89 OTHER CHEST PAIN: Status: RESOLVED | Noted: 2020-10-29 | Resolved: 2024-09-29

## 2024-09-29 PROBLEM — R51.9 HEADACHE: Status: RESOLVED | Noted: 2019-11-26 | Resolved: 2024-09-29

## 2024-09-29 PROBLEM — R20.0 NUMBNESS AND TINGLING: Status: RESOLVED | Noted: 2021-12-13 | Resolved: 2024-09-29

## 2024-09-29 PROBLEM — M62.81 MUSCLE WEAKNESS: Status: RESOLVED | Noted: 2019-06-06 | Resolved: 2024-09-29

## 2024-09-29 PROBLEM — S61.511A TEAR OF SKIN OF RIGHT WRIST: Status: RESOLVED | Noted: 2024-09-12 | Resolved: 2024-09-29

## 2024-09-29 PROBLEM — Z51.5 COMFORT MEASURES ONLY STATUS: Status: RESOLVED | Noted: 2020-10-21 | Resolved: 2024-09-29

## 2024-09-29 PROBLEM — K59.00 CONSTIPATION: Status: RESOLVED | Noted: 2017-01-04 | Resolved: 2024-09-29

## 2024-09-29 PROBLEM — R26.2 DIFFICULTY WALKING: Status: RESOLVED | Noted: 2019-06-06 | Resolved: 2024-09-29

## 2024-09-29 PROBLEM — K42.9 UMBILICAL HERNIA WITHOUT OBSTRUCTION AND WITHOUT GANGRENE: Status: RESOLVED | Noted: 2017-05-09 | Resolved: 2024-09-29

## 2024-09-29 PROBLEM — I50.20 HFREF (HEART FAILURE WITH REDUCED EJECTION FRACTION): Status: RESOLVED | Noted: 2024-05-28 | Resolved: 2024-09-29

## 2024-09-29 LAB
ALBUMIN SERPL BCP-MCNC: 3.1 G/DL (ref 3.5–5.2)
ALP SERPL-CCNC: 33 U/L (ref 55–135)
ALT SERPL W/O P-5'-P-CCNC: 6 U/L (ref 10–44)
ANION GAP SERPL CALC-SCNC: 13 MMOL/L (ref 8–16)
AST SERPL-CCNC: 17 U/L (ref 10–40)
BASOPHILS # BLD AUTO: 0.05 K/UL (ref 0–0.2)
BASOPHILS NFR BLD: 0.4 % (ref 0–1.9)
BILIRUB SERPL-MCNC: 0.7 MG/DL (ref 0.1–1)
BNP SERPL-MCNC: 458 PG/ML (ref 0–99)
BUN SERPL-MCNC: 49 MG/DL (ref 8–23)
CALCIUM SERPL-MCNC: 8.8 MG/DL (ref 8.7–10.5)
CHLORIDE SERPL-SCNC: 93 MMOL/L (ref 95–110)
CO2 SERPL-SCNC: 26 MMOL/L (ref 23–29)
CREAT SERPL-MCNC: 1.9 MG/DL (ref 0.5–1.4)
DIFFERENTIAL METHOD BLD: ABNORMAL
EOSINOPHIL # BLD AUTO: 0 K/UL (ref 0–0.5)
EOSINOPHIL NFR BLD: 0 % (ref 0–8)
ERYTHROCYTE [DISTWIDTH] IN BLOOD BY AUTOMATED COUNT: 15.9 % (ref 11.5–14.5)
EST. GFR  (NO RACE VARIABLE): 26.2 ML/MIN/1.73 M^2
GLUCOSE SERPL-MCNC: 133 MG/DL (ref 70–110)
HCT VFR BLD AUTO: 27.4 % (ref 37–48.5)
HGB BLD-MCNC: 8.7 G/DL (ref 12–16)
IMM GRANULOCYTES # BLD AUTO: 0.05 K/UL (ref 0–0.04)
IMM GRANULOCYTES NFR BLD AUTO: 0.4 % (ref 0–0.5)
LACTATE SERPL-SCNC: 1.1 MMOL/L (ref 0.5–2.2)
LYMPHOCYTES # BLD AUTO: 1.1 K/UL (ref 1–4.8)
LYMPHOCYTES NFR BLD: 9 % (ref 18–48)
MAGNESIUM SERPL-MCNC: 1.9 MG/DL (ref 1.6–2.6)
MCH RBC QN AUTO: 29 PG (ref 27–31)
MCHC RBC AUTO-ENTMCNC: 31.8 G/DL (ref 32–36)
MCV RBC AUTO: 91 FL (ref 82–98)
MONOCYTES # BLD AUTO: 1.9 K/UL (ref 0.3–1)
MONOCYTES NFR BLD: 15.2 % (ref 4–15)
NEUTROPHILS # BLD AUTO: 9.2 K/UL (ref 1.8–7.7)
NEUTROPHILS NFR BLD: 75 % (ref 38–73)
NRBC BLD-RTO: 0 /100 WBC
PHOSPHATE SERPL-MCNC: 4.4 MG/DL (ref 2.7–4.5)
PLATELET # BLD AUTO: 217 K/UL (ref 150–450)
PMV BLD AUTO: 9.9 FL (ref 9.2–12.9)
POTASSIUM SERPL-SCNC: 4.5 MMOL/L (ref 3.5–5.1)
PROT SERPL-MCNC: 6.3 G/DL (ref 6–8.4)
RBC # BLD AUTO: 3 M/UL (ref 4–5.4)
SODIUM SERPL-SCNC: 132 MMOL/L (ref 136–145)
TROPONIN I SERPL DL<=0.01 NG/ML-MCNC: 0.04 NG/ML (ref 0–0.03)
TSH SERPL DL<=0.005 MIU/L-ACNC: 1.71 UIU/ML (ref 0.4–4)
WBC # BLD AUTO: 12.31 K/UL (ref 3.9–12.7)

## 2024-09-29 PROCEDURE — 83880 ASSAY OF NATRIURETIC PEPTIDE: CPT | Performed by: EMERGENCY MEDICINE

## 2024-09-29 PROCEDURE — 93010 ELECTROCARDIOGRAM REPORT: CPT | Mod: ,,, | Performed by: INTERNAL MEDICINE

## 2024-09-29 PROCEDURE — 93296 REM INTERROG EVL PM/IDS: CPT | Performed by: INTERNAL MEDICINE

## 2024-09-29 PROCEDURE — 63600175 PHARM REV CODE 636 W HCPCS: Performed by: EMERGENCY MEDICINE

## 2024-09-29 PROCEDURE — 87088 URINE BACTERIA CULTURE: CPT | Performed by: EMERGENCY MEDICINE

## 2024-09-29 PROCEDURE — 99285 EMERGENCY DEPT VISIT HI MDM: CPT | Mod: 25

## 2024-09-29 PROCEDURE — 87086 URINE CULTURE/COLONY COUNT: CPT | Performed by: EMERGENCY MEDICINE

## 2024-09-29 PROCEDURE — 80053 COMPREHEN METABOLIC PANEL: CPT | Performed by: EMERGENCY MEDICINE

## 2024-09-29 PROCEDURE — 51798 US URINE CAPACITY MEASURE: CPT

## 2024-09-29 PROCEDURE — 93005 ELECTROCARDIOGRAM TRACING: CPT

## 2024-09-29 PROCEDURE — 84540 ASSAY OF URINE/UREA-N: CPT | Performed by: EMERGENCY MEDICINE

## 2024-09-29 PROCEDURE — 83735 ASSAY OF MAGNESIUM: CPT | Performed by: EMERGENCY MEDICINE

## 2024-09-29 PROCEDURE — G0378 HOSPITAL OBSERVATION PER HR: HCPCS

## 2024-09-29 PROCEDURE — 83605 ASSAY OF LACTIC ACID: CPT | Performed by: EMERGENCY MEDICINE

## 2024-09-29 PROCEDURE — 87186 SC STD MICRODIL/AGAR DIL: CPT | Performed by: EMERGENCY MEDICINE

## 2024-09-29 PROCEDURE — 84443 ASSAY THYROID STIM HORMONE: CPT | Performed by: EMERGENCY MEDICINE

## 2024-09-29 PROCEDURE — 85025 COMPLETE CBC W/AUTO DIFF WBC: CPT | Performed by: EMERGENCY MEDICINE

## 2024-09-29 PROCEDURE — 25000003 PHARM REV CODE 250: Performed by: HOSPITALIST

## 2024-09-29 PROCEDURE — 84100 ASSAY OF PHOSPHORUS: CPT | Performed by: EMERGENCY MEDICINE

## 2024-09-29 PROCEDURE — 84484 ASSAY OF TROPONIN QUANT: CPT | Performed by: EMERGENCY MEDICINE

## 2024-09-29 PROCEDURE — 81001 URINALYSIS AUTO W/SCOPE: CPT | Performed by: EMERGENCY MEDICINE

## 2024-09-29 PROCEDURE — 96360 HYDRATION IV INFUSION INIT: CPT

## 2024-09-29 PROCEDURE — 93294 REM INTERROG EVL PM/LDLS PM: CPT | Mod: ,,, | Performed by: INTERNAL MEDICINE

## 2024-09-29 RX ORDER — CLOPIDOGREL BISULFATE 75 MG/1
75 TABLET ORAL DAILY
Status: DISCONTINUED | OUTPATIENT
Start: 2024-09-30 | End: 2024-10-05 | Stop reason: HOSPADM

## 2024-09-29 RX ORDER — IBUPROFEN 200 MG
24 TABLET ORAL
Status: DISCONTINUED | OUTPATIENT
Start: 2024-09-29 | End: 2024-10-05 | Stop reason: HOSPADM

## 2024-09-29 RX ORDER — AMOXICILLIN AND CLAVULANATE POTASSIUM 875; 125 MG/1; MG/1
1 TABLET, FILM COATED ORAL EVERY 24 HOURS
Status: DISCONTINUED | OUTPATIENT
Start: 2024-09-30 | End: 2024-09-29

## 2024-09-29 RX ORDER — GLUCAGON 1 MG
1 KIT INJECTION
Status: DISCONTINUED | OUTPATIENT
Start: 2024-09-29 | End: 2024-10-05 | Stop reason: HOSPADM

## 2024-09-29 RX ORDER — ATORVASTATIN CALCIUM 10 MG/1
10 TABLET, FILM COATED ORAL DAILY
Status: DISCONTINUED | OUTPATIENT
Start: 2024-09-30 | End: 2024-10-05 | Stop reason: HOSPADM

## 2024-09-29 RX ORDER — ACETAMINOPHEN 325 MG/1
650 TABLET ORAL EVERY 4 HOURS PRN
Status: DISCONTINUED | OUTPATIENT
Start: 2024-09-29 | End: 2024-10-05 | Stop reason: HOSPADM

## 2024-09-29 RX ORDER — PANTOPRAZOLE SODIUM 40 MG/1
40 TABLET, DELAYED RELEASE ORAL DAILY
Status: DISCONTINUED | OUTPATIENT
Start: 2024-09-30 | End: 2024-10-05 | Stop reason: HOSPADM

## 2024-09-29 RX ORDER — CARVEDILOL 6.25 MG/1
6.25 TABLET ORAL 2 TIMES DAILY WITH MEALS
Status: DISCONTINUED | OUTPATIENT
Start: 2024-09-30 | End: 2024-10-05 | Stop reason: HOSPADM

## 2024-09-29 RX ORDER — LORAZEPAM 0.5 MG/1
0.5 TABLET ORAL EVERY 12 HOURS PRN
Status: DISCONTINUED | OUTPATIENT
Start: 2024-09-29 | End: 2024-10-05 | Stop reason: HOSPADM

## 2024-09-29 RX ORDER — ISOSORBIDE MONONITRATE 60 MG/1
60 TABLET, EXTENDED RELEASE ORAL DAILY
Status: DISCONTINUED | OUTPATIENT
Start: 2024-09-30 | End: 2024-09-29

## 2024-09-29 RX ORDER — ONDANSETRON HYDROCHLORIDE 2 MG/ML
4 INJECTION, SOLUTION INTRAVENOUS EVERY 8 HOURS PRN
Status: DISCONTINUED | OUTPATIENT
Start: 2024-09-29 | End: 2024-10-05 | Stop reason: HOSPADM

## 2024-09-29 RX ORDER — RANOLAZINE 500 MG/1
500 TABLET, EXTENDED RELEASE ORAL 2 TIMES DAILY
Status: DISCONTINUED | OUTPATIENT
Start: 2024-09-29 | End: 2024-10-05 | Stop reason: HOSPADM

## 2024-09-29 RX ORDER — SODIUM CHLORIDE 0.9 % (FLUSH) 0.9 %
10 SYRINGE (ML) INJECTION
Status: DISCONTINUED | OUTPATIENT
Start: 2024-09-29 | End: 2024-10-05 | Stop reason: HOSPADM

## 2024-09-29 RX ORDER — TALC
6 POWDER (GRAM) TOPICAL NIGHTLY PRN
Status: DISCONTINUED | OUTPATIENT
Start: 2024-09-29 | End: 2024-10-05 | Stop reason: HOSPADM

## 2024-09-29 RX ORDER — CARVEDILOL 6.25 MG/1
6.25 TABLET ORAL 2 TIMES DAILY WITH MEALS
Status: DISCONTINUED | OUTPATIENT
Start: 2024-09-29 | End: 2024-09-29

## 2024-09-29 RX ORDER — DULOXETIN HYDROCHLORIDE 20 MG/1
20 CAPSULE, DELAYED RELEASE ORAL DAILY
Status: DISCONTINUED | OUTPATIENT
Start: 2024-09-30 | End: 2024-10-05 | Stop reason: HOSPADM

## 2024-09-29 RX ORDER — GABAPENTIN 300 MG/1
300 CAPSULE ORAL NIGHTLY
Status: DISCONTINUED | OUTPATIENT
Start: 2024-09-29 | End: 2024-10-05 | Stop reason: HOSPADM

## 2024-09-29 RX ORDER — IBUPROFEN 200 MG
16 TABLET ORAL
Status: DISCONTINUED | OUTPATIENT
Start: 2024-09-29 | End: 2024-10-05 | Stop reason: HOSPADM

## 2024-09-29 RX ORDER — NALOXONE HCL 0.4 MG/ML
0.02 VIAL (ML) INJECTION
Status: DISCONTINUED | OUTPATIENT
Start: 2024-09-29 | End: 2024-10-05 | Stop reason: HOSPADM

## 2024-09-29 RX ORDER — MONTELUKAST SODIUM 10 MG/1
10 TABLET ORAL DAILY
Status: DISCONTINUED | OUTPATIENT
Start: 2024-09-30 | End: 2024-10-05 | Stop reason: HOSPADM

## 2024-09-29 RX ORDER — PROCHLORPERAZINE EDISYLATE 5 MG/ML
5 INJECTION INTRAMUSCULAR; INTRAVENOUS EVERY 6 HOURS PRN
Status: DISCONTINUED | OUTPATIENT
Start: 2024-09-29 | End: 2024-10-05 | Stop reason: HOSPADM

## 2024-09-29 RX ADMIN — SODIUM CHLORIDE 500 ML: 9 INJECTION, SOLUTION INTRAVENOUS at 11:09

## 2024-09-29 RX ADMIN — SODIUM CHLORIDE, POTASSIUM CHLORIDE, SODIUM LACTATE AND CALCIUM CHLORIDE 250 ML: 600; 310; 30; 20 INJECTION, SOLUTION INTRAVENOUS at 05:09

## 2024-09-29 RX ADMIN — SODIUM CHLORIDE, POTASSIUM CHLORIDE, SODIUM LACTATE AND CALCIUM CHLORIDE 250 ML: 600; 310; 30; 20 INJECTION, SOLUTION INTRAVENOUS at 03:09

## 2024-09-29 RX ADMIN — RANOLAZINE 500 MG: 500 TABLET, EXTENDED RELEASE ORAL at 09:09

## 2024-09-29 RX ADMIN — APIXABAN 5 MG: 5 TABLET, FILM COATED ORAL at 08:09

## 2024-09-29 RX ADMIN — GABAPENTIN 300 MG: 300 CAPSULE ORAL at 08:09

## 2024-09-29 NOTE — ED PROVIDER NOTES
Encounter Date: 9/29/2024       History     Chief Complaint   Patient presents with    Weakness     Arrives via EMS from home. D/c yesterday for CHF, UTI. C/o low B/P at home.      79-year-old woman with comorbidities of HFpEF and pulmonary hypertension, chronic respiratory failure on 2 LPM at home, arthritis, anxiety, hypertension, atrial fibrillation, CAD, and previous stroke presents to the emergency department by EMS for evaluation of worsening generalized weakness over the past 2 days.  She was recently admitted to this facility for 5 years for aggressive IV diuresis, however, she presents today with generalized fatigue, weakness, and low blood pressures at home (80s over 50). At the time of my exam, she denies ramone fever or chills as well as any worsened shortness of breath, nausea, diarrhea, melena, or fall.    The history is provided by the patient and medical records. No  was used.     Review of patient's allergies indicates:   Allergen Reactions    Celebrex [celecoxib] Shortness Of Breath    Ciprofloxacin Swelling     lip    Dicyclomine Hives    Adhesive Dermatitis    Avelox [moxifloxacin] Swelling    Cilostazol Other (See Comments)     Elevates blood pressure    Eryc [erythromycin] Other (See Comments)     unknown    Iodine and iodide containing products Hives    Keflex [cephalexin] Hives    Meclomen      rashes    Meloxicam      Ear ringing    Metoclopramide Other (See Comments)     High blood pressure    Sulfa (sulfonamide antibiotics) Itching     Past Medical History:   Diagnosis Date    Acute on chronic diastolic (congestive) heart failure 11/20/2019    Anticoagulant long-term use     on Plavix since May 2015    Anxiety     Arthritis     Asthma     Atrial fibrillation     Atrial fibrillation with RVR 10/19/2020    Cataracts, bilateral     Chest pain, atypical     Chronic atrial fibrillation with rapid ventricular response 06/23/2017    Colon polyp     Coronary artery disease      Diabetes mellitus     Dry eyes     Esophageal erosions     General anesthetics causing adverse effect in therapeutic use     GERD (gastroesophageal reflux disease)     Heart murmur     Hemorrhoid     High cholesterol     Hypertension     Irritable bowel syndrome     Paroxysmal atrial fibrillation 10/30/2020    Persistent atrial fibrillation 02/10/2020    Shingles 2015    Stenosis of aortic and mitral valves     Stroke     TIA (transient ischemic attack)     Use of cane as ambulatory aid      Past Surgical History:   Procedure Laterality Date    ABDOMINAL SURGERY      stents to SMA    angiocele      2007, 2014    AORTIC VALVE REPLACEMENT N/A 11/19/2019    Procedure: Replacement-valve-aortic;  Surgeon: Jack Capone MD;  Location: [facility] CATH LAB;  Service: Cardiology;  Laterality: N/A;    BREAST SURGERY      left--- a lump--- no cancer    CARDIAC VALVE SURGERY      COLONOSCOPY  2014    COLONOSCOPY N/A 3/14/2018    Procedure: COLONOSCOPY;  Surgeon: Efren Kemp MD;  Location: [facility] ENDO ([address] [place] [address]);  Service: Endoscopy;  Laterality: N/A;  ok to hold Plavix 5 days prior to procedure per Dr RESHMA Hernandez     ok per Dr Kemp to hold Eliquis 2 days prior to procedure (see telephone encounter dated-2/7/18)    CORONARY ANGIOGRAPHY N/A 2/15/2024    Procedure: ANGIOGRAM, CORONARY ARTERY;  Surgeon: Jos Baptiste MD;  Location: [facility] CATH LAB;  Service: Cardiology;  Laterality: N/A;    HERNIA REPAIR      HYSTERECTOMY  partial    1982 partial hysterectomy    IMPLANTATION OF BIVENTRICULAR PERMANENT PACEMAKER AS UPGRADE TO EXISTING PACEMAKER Left 5/29/2024    Procedure: Biventricular pacemaker upgrade;  Surgeon: Chencho Moeller MD;  Location: [facility] EP LAB;  Service: Cardiology;  Laterality: Left;  CHF, AF,  CRTP ugrd, BIO, MAC, SD, 3prep ** BIO dcPPM in situ/ Contrast Prep**    LEFT HEART CATHETERIZATION Right 11/5/2019    Procedure: Left heart cath;  Surgeon: Jack Capone MD;  Location: [facility] CATH LAB;  Service: Cardiology;   Laterality: Right;    left nasal polyp      left neck lymph node      nose polyp      PHLEBOGRAPHY Left 5/1/2024    Procedure: Venogram;  Surgeon: Chencho Moeller MD;  Location: [facility] EP LAB;  Service: Cardiology;  Laterality: Left;  HF, Venogram ( Lt arm) , OK, 3 prep ** BIO dcPPM in situ/ Contrast Prep**    right hip fatty mass tissue      stent to small intestine      SUPERIOR VENA CAVA ANGIOPLASTY / STENTING      TONSILLECTOMY      TOTAL ABDOMINAL HYSTERECTOMY      2014    TOTAL KNEE ARTHROPLASTY Bilateral     TREATMENT OF CARDIAC ARRHYTHMIA N/A 2/10/2020    Procedure: CARDIOVERSION;  Surgeon: Jayy Parrish MD;  Location: [facility] EP LAB;  Service: Cardiology;  Laterality: N/A;  AF, PEDRO, DCCV, MAC, DM, 3 Prep    TREATMENT OF CARDIAC ARRHYTHMIA N/A 5/15/2020    Procedure: CARDIOVERSION;  Surgeon: Hany Bee MD;  Location: [facility] EP LAB;  Service: Cardiology;  Laterality: N/A;  AF, PEDRO, DCCV, MAC, DM, 3 Prep    UPPER GASTROINTESTINAL ENDOSCOPY  2014     Family History   Problem Relation Name Age of Onset    Heart attack Mother      Stroke Father      Heart failure Brother      Colon cancer Neg Hx      Inflammatory bowel disease Neg Hx       Social History     Tobacco Use    Smoking status: Never    Smokeless tobacco: Never   Substance Use Topics    Alcohol use: No    Drug use: Never     Review of Systems    Physical Exam     Initial Vitals   BP Pulse Resp Temp SpO2   09/29/24 1511 09/28/24 0659 09/29/24 1511 09/29/24 1511 09/29/24 1511   (!) 102/55 (!) 59 20 98.4 °F (36.9 °C) 100 %      MAP       --                Physical Exam    Vitals reviewed.  Constitutional:   79-year-old  woman, chronically ill-appearing, fatigued, mild distress noted; on 2 L LPM via NC   HENT:   Head: Normocephalic and atraumatic.   Significantly desiccated mucous membranes noted without additional intraoral injury; redundant soft palate noted   Eyes: EOM are normal. Pupils are equal, round, and reactive to light.   Neck: No  tracheal deviation present.   Cardiovascular:            Mild bradycardia is noted with diminished peripheral pulses   Pulmonary/Chest: No stridor.   Mildly diminished breath sounds at bilateral bases without evidence of increased work of breathing nor evidence of accessory muscle use   Abdominal:   Mildly obese, soft, nondistended, nontender   Musculoskeletal:         General: No edema. Normal range of motion.     Neurological: She is alert and oriented to person, place, and time. GCS score is 15. GCS eye subscore is 4. GCS verbal subscore is 5. GCS motor subscore is 6.   Skin: Skin is warm and dry.   Psychiatric:   Mildly flat affect, cooperative, pleasant         ED Course   Critical Care    Date/Time: 9/29/2024 4:30 PM    Performed by: Sunny Chan MD  Authorized by: Sunny Chan MD  Direct patient critical care time: 15 minutes  Additional history critical care time: 5 minutes  Ordering / reviewing critical care time: 10 minutes  Documentation critical care time: 5 minutes  Consulting other physicians critical care time: 10 minutes  Consult with family critical care time: 5 minutes  Total critical care time (exclusive of procedural time) : 50 minutes  Critical care was necessary to treat or prevent imminent or life-threatening deterioration of the following conditions: renal failure, circulatory failure and dehydration.  Critical care was time spent personally by me on the following activities: blood draw for specimens, discussions with consultants, development of treatment plan with patient or surrogate, interpretation of cardiac output measurements, evaluation of patient's response to treatment, examination of patient, obtaining history from patient or surrogate, ordering and performing treatments and interventions, ordering and review of laboratory studies, ordering and review of radiographic studies, pulse oximetry, re-evaluation of patient's condition and review of old  charts.        Labs Reviewed   CBC W/ AUTO DIFFERENTIAL - Abnormal       Result Value    WBC 12.31      RBC 3.00 (*)     Hemoglobin 8.7 (*)     Hematocrit 27.4 (*)     MCV 91      MCH 29.0      MCHC 31.8 (*)     RDW 15.9 (*)     Platelets 217      MPV 9.9      Immature Granulocytes 0.4      Gran # (ANC) 9.2 (*)     Immature Grans (Abs) 0.05 (*)     Lymph # 1.1      Mono # 1.9 (*)     Eos # 0.0      Baso # 0.05      nRBC 0      Gran % 75.0 (*)     Lymph % 9.0 (*)     Mono % 15.2 (*)     Eosinophil % 0.0      Basophil % 0.4      Differential Method Automated     COMPREHENSIVE METABOLIC PANEL - Abnormal    Sodium 132 (*)     Potassium 4.5      Chloride 93 (*)     CO2 26      Glucose 133 (*)     BUN 49 (*)     Creatinine 1.9 (*)     Calcium 8.8      Total Protein 6.3      Albumin 3.1 (*)     Total Bilirubin 0.7      Alkaline Phosphatase 33 (*)     AST 17      ALT 6 (*)     eGFR 26.2 (*)     Anion Gap 13     TROPONIN I - Abnormal    Troponin I 0.042 (*)    B-TYPE NATRIURETIC PEPTIDE - Abnormal     (*)    LACTIC ACID, PLASMA    Lactate (Lactic Acid) 1.1     TSH    TSH 1.710     MAGNESIUM    Magnesium 1.9     PHOSPHORUS    Phosphorus 4.4       EKG Readings: (Independently Interpreted)   Heart Rate: 60. Ectopy: No Ectopy. ST Segments: Normal ST Segments. Axis: Right Axis Deviation.   Paced overlying atrial flutter     ECG Results              EKG 12-lead (Final result)        Collection Time Result Time QRS Duration OHS QTC Calculation    09/29/24 15:29:53 09/30/24 14:18:41 94 250                     Final result by Interface, Lab In [facility] (09/30/24 14:18:43)                   Narrative:    Test Reason : R53.1,    Vent. Rate : 133 BPM     Atrial Rate : 058 BPM     P-R Int : 000 ms          QRS Dur : 094 ms      QT Int : 168 ms       P-R-T Axes : 000 092 097 degrees     QTc Int : 250 ms    Marked artifact  Atrial flutter with what is probably ventricular pacing  Rightward axis  Anteroseptal infarct ,age  undetermined  Abnormal ECG  When compared with ECG of 24-SEP-2024 17:10,  Poor data quality in current ECG precludes serial comparison  Confirmed by Krzysztof Francisco MD ([id number]) on 9/30/2024 2:18:34 PM    Referred By: [email or web address]   SELF           Confirmed By:Krzysztof Francisco MD                                  Imaging Results              X-Ray Chest AP Portable (Final result)  Result time 09/29/24 17:26:39      Final result by Mariano Worthy MD (09/29/24 17:26:39)                   Impression:      Cardiomegaly with suspected mild pulmonary edema.      Electronically signed by: Mariano Worthy MD  Date:    09/29/2024  Time:    17:26               Narrative:    EXAMINATION:  XR CHEST AP PORTABLE    CLINICAL HISTORY:  weakness;    TECHNIQUE:  Single frontal view of the chest was performed.    COMPARISON:  09/24/2024.    FINDINGS:  There is unchanged appearance of a left-sided AICD lead.  The changes of prior endovascular repair of the aortic valve.  Monitoring EKG leads are present.    The trachea is unremarkable.  There are calcifications of the aortic knob.  There is unchanged enlargement of the cardiomediastinal silhouette.  There is no evidence of free air beneath the hemidiaphragms.  The suspected small left-sided pleural effusion.  There is no evidence of a pneumothorax.  There is no evidence of pneumomediastinum.  There is increased attenuation of pulmonary interstitium.  There is no focal consolidation.  There are degenerative changes in the osseous structures.                                       Medications   atorvastatin tablet 10 mg (10 mg Oral Given 10/1/24 0827)   clopidogreL tablet 75 mg (75 mg Oral Given 10/1/24 0827)   DULoxetine DR capsule 20 mg (20 mg Oral Given 10/1/24 0827)   LORazepam tablet 0.5 mg (0.5 mg Oral Given 9/30/24 0542)   pantoprazole EC tablet 40 mg (40 mg Oral Given 10/1/24 0827)   ranolazine 12 hr tablet 500 mg (500 mg Oral Given 10/1/24 0827)   sodium chloride 0.9% flush 10 mL (has no  administration in time range)   melatonin tablet 6 mg (has no administration in time range)   ondansetron injection 4 mg (has no administration in time range)   prochlorperazine injection Soln 5 mg (has no administration in time range)   acetaminophen tablet 650 mg (650 mg Oral Given 10/1/24 1024)   naloxone 0.4 mg/mL injection 0.02 mg (has no administration in time range)   glucose chewable tablet 16 g (has no administration in time range)   glucose chewable tablet 24 g (has no administration in time range)   glucagon (human recombinant) injection 1 mg (has no administration in time range)   dextrose 10% bolus 125 mL 125 mL (has no administration in time range)   dextrose 10% bolus 250 mL 250 mL (has no administration in time range)   gabapentin capsule 300 mg (300 mg Oral Given 9/30/24 2027)   montelukast tablet 10 mg (10 mg Oral Given 10/1/24 0827)   carvediloL tablet 6.25 mg (6.25 mg Oral Given 10/1/24 0827)   albuterol-ipratropium 2.5 mg-0.5 mg/3 mL nebulizer solution 3 mL (has no administration in time range)   apixaban tablet 5 mg (has no administration in time range)   amoxicillin-clavulanate 875-125mg per tablet 1 tablet (has no administration in time range)   furosemide injection 80 mg (has no administration in time range)   lactated ringers bolus 250 mL (0 mLs Intravenous Stopped 9/29/24 1700)   lactated ringers bolus 250 mL (0 mLs Intravenous Stopped 9/29/24 2036)   sodium chloride 0.9% bolus 500 mL 500 mL (0 mLs Intravenous Stopped 9/30/24 0023)   furosemide injection 80 mg (80 mg Intravenous Given 9/30/24 0810)   furosemide injection 80 mg (80 mg Intravenous Given 10/1/24 0040)   furosemide injection 80 mg (80 mg Intravenous Given 10/1/24 0903)     Medical Decision Making  Differential diagnosis:  Lethal arrhythmia, symptomatic anemia, TACOS, electrolyte derangement, UTI    Amount and/or Complexity of Data Reviewed  Labs: ordered. Decision-making details documented in ED Course.  Radiology: ordered.  Decision-making details documented in ED Course.  ECG/medicine tests: ordered and independent interpretation performed. Decision-making details documented in ED Course.    Risk  Decision regarding hospitalization.              Attending Attestation:             Attending ED Notes:   Patient noted to have evidence of significant volume contraction on exam as well as mild hypotension at presentation and noted at home as well.  Mental status intact without evidence of acute respiratory distress.  Due to patient underlying significant heart disease, low volume fluid boluses initiated for resuscitation with goal of map greater than 65.  Laboratory evaluation reveals significantly worsened renal function from baseline noted yesterday of 0.7, now greater than 2x at 1.9.  Following fluid resuscitation, peripheral pulses noted improved, and patient without evidence of worsened respiratory status.  EKG reveals a paced rhythm with an underlying atrial flutter without acute injury pattern.  Chest x-ray revealed chronic changes with potential for mild edema.  Findings and concerns have been discussed with Hospital Medicine, and this patient will be placed in observation under the care in fair condition for further therapy and management.                             Clinical Impression:  Final diagnoses:  [R53.1] Weakness  [E87.1] Hyponatremia (Primary)  [N17.9] Acute kidney injury  [N17.9] TACOS (acute kidney injury)  [D53.9] Macrocytic anemia  [R57.1] Hypovolemic shock  [I27.20] Pulmonary hypertension  [J96.10] Chronic respiratory failure, unspecified whether with hypoxia or hypercapnia          ED Disposition Condition    Observation Stable                Sunny Chan MD  10/01/24 2284

## 2024-09-29 NOTE — ED TRIAGE NOTES
Pt presents to the ED for hypotension. Pt was discharged yesterday following a 5 day admission for fluid overload. Pt states she was feeling fatigued at home and her BP was labile. Pt denies any chest pain or SOB.

## 2024-09-29 NOTE — HPI
80 yo F with PMHx HF with recovered EF, hx of TAVR, Afib on elqiuis, HLD, HTN, and COPD on home 2L O2 who presents to the ED complaining of weakness/generalized malaise since her discharge yesterday. Pt. Was just admitted to this facility 9/24-9/28 for for decompensated heart failure. She was diuresed for 5 days with IV lasix, and she reports that she was feeeling well at discharge yesterday. Today, however, she woke up with lightheadedness and agtigue. She felt very weak when trying to just get out of bed and move around her house. She noted her BP was lower than usual (reported to be 80's/50's, so she came back to the ED for further evaluation. She denies any fevers, chills, SOB, chest pain, or palpitations.

## 2024-09-29 NOTE — PLAN OF CARE
[facility] - Cardiology Stepdown  Discharge Final Note    Primary Care Provider: Krzysztof Mcgraw MD    Expected Discharge Date: 9/28/2024    Final Discharge Note (most recent)       Final Note - 09/28/24 1923          Final Note    Assessment Type Final Discharge Note (P)      Anticipated Discharge Disposition Home or Self Care (P)      Hospital Resources/Appts/Education Provided Provided patient/caregiver with written discharge plan information;Provided education on problems/symptoms using teachback;Appointments scheduled and added to AVS (P)         Post-Acute Status    Post-Acute Authorization Other (P)      Coverage [employer] (P)      Other Status Awaiting f/u Appts (P)    Cardiology follow-up.    Discharge Delays None known at this time (P)                      Important Message from Medicare  Important Message from Medicare regarding Discharge Appeal Rights: Given to patient/caregiver, Explained to patient/caregiver, Signed/date by patient/caregiver     Date IMM was signed: 09/28/24  Time IMM was signed: 1103    Contact Info       Krzysztof Mcgraw MD   Specialty: Family Medicine   Relationship: PCP - General    [address] [facility]  [address]  [place] [address]   Phone: [phone]       Next Steps: Follow up in 3 day(s)          Pt. discharged home with Self-Care. Waiting for Cardiology follow-up appt.     Martinez Thompson LMSW

## 2024-09-29 NOTE — PLAN OF CARE
Future Appointments   Date Time Provider Department Center   10/2/2024 11:30 AM Ronald Rankin MD [facility]         [facility] Cards appointment scheduled. Previous cardiologist and previous locations booked, patient scheduled at another near location.      [facility] booked until NOV 13  [facility] booked until OCT 24        ALEXY Weaver  Case Management  [id number]

## 2024-09-30 LAB
ALBUMIN SERPL BCP-MCNC: 3.2 G/DL (ref 3.5–5.2)
ALLENS TEST: ABNORMAL
ALP SERPL-CCNC: 35 U/L (ref 55–135)
ALT SERPL W/O P-5'-P-CCNC: 11 U/L (ref 10–44)
ANION GAP SERPL CALC-SCNC: 9 MMOL/L (ref 8–16)
ANION GAP SERPL CALC-SCNC: 9 MMOL/L (ref 8–16)
AST SERPL-CCNC: 14 U/L (ref 10–40)
BACTERIA #/AREA URNS AUTO: ABNORMAL /HPF
BACTERIA #/AREA URNS AUTO: ABNORMAL /HPF
BASOPHILS # BLD AUTO: 0.04 K/UL (ref 0–0.2)
BASOPHILS NFR BLD: 0.3 % (ref 0–1.9)
BILIRUB SERPL-MCNC: 0.6 MG/DL (ref 0.1–1)
BILIRUB UR QL STRIP: NEGATIVE
BILIRUB UR QL STRIP: NEGATIVE
BUN SERPL-MCNC: 50 MG/DL (ref 8–23)
BUN SERPL-MCNC: 52 MG/DL (ref 8–23)
CALCIUM SERPL-MCNC: 9.3 MG/DL (ref 8.7–10.5)
CALCIUM SERPL-MCNC: 9.7 MG/DL (ref 8.7–10.5)
CHLORIDE SERPL-SCNC: 90 MMOL/L (ref 95–110)
CHLORIDE SERPL-SCNC: 93 MMOL/L (ref 95–110)
CLARITY UR REFRACT.AUTO: ABNORMAL
CLARITY UR REFRACT.AUTO: ABNORMAL
CO2 SERPL-SCNC: 32 MMOL/L (ref 23–29)
CO2 SERPL-SCNC: 34 MMOL/L (ref 23–29)
COLOR UR AUTO: YELLOW
COLOR UR AUTO: YELLOW
CREAT SERPL-MCNC: 1.6 MG/DL (ref 0.5–1.4)
CREAT SERPL-MCNC: 1.7 MG/DL (ref 0.5–1.4)
DELSYS: ABNORMAL
DIFFERENTIAL METHOD BLD: ABNORMAL
EOSINOPHIL # BLD AUTO: 0 K/UL (ref 0–0.5)
EOSINOPHIL NFR BLD: 0.1 % (ref 0–8)
ERYTHROCYTE [DISTWIDTH] IN BLOOD BY AUTOMATED COUNT: 15.9 % (ref 11.5–14.5)
EST. GFR  (NO RACE VARIABLE): 29.9 ML/MIN/1.73 M^2
EST. GFR  (NO RACE VARIABLE): 32.2 ML/MIN/1.73 M^2
GLUCOSE SERPL-MCNC: 119 MG/DL (ref 70–110)
GLUCOSE SERPL-MCNC: 150 MG/DL (ref 70–110)
GLUCOSE UR QL STRIP: ABNORMAL
GLUCOSE UR QL STRIP: ABNORMAL
HCO3 UR-SCNC: 12.6 MMOL/L (ref 24–28)
HCT VFR BLD AUTO: 31.2 % (ref 37–48.5)
HCT VFR BLD CALC: <15 %PCV (ref 36–54)
HGB BLD-MCNC: 9.5 G/DL (ref 12–16)
HGB UR QL STRIP: ABNORMAL
HGB UR QL STRIP: NEGATIVE
HYALINE CASTS UR QL AUTO: 0 /LPF
HYALINE CASTS UR QL AUTO: 12 /LPF
IMM GRANULOCYTES # BLD AUTO: 0.07 K/UL (ref 0–0.04)
IMM GRANULOCYTES NFR BLD AUTO: 0.6 % (ref 0–0.5)
KETONES UR QL STRIP: NEGATIVE
KETONES UR QL STRIP: NEGATIVE
LEUKOCYTE ESTERASE UR QL STRIP: ABNORMAL
LEUKOCYTE ESTERASE UR QL STRIP: ABNORMAL
LYMPHOCYTES # BLD AUTO: 1.2 K/UL (ref 1–4.8)
LYMPHOCYTES NFR BLD: 9.3 % (ref 18–48)
MAGNESIUM SERPL-MCNC: 2.2 MG/DL (ref 1.6–2.6)
MCH RBC QN AUTO: 29 PG (ref 27–31)
MCHC RBC AUTO-ENTMCNC: 30.4 G/DL (ref 32–36)
MCV RBC AUTO: 95 FL (ref 82–98)
MICROSCOPIC COMMENT: ABNORMAL
MICROSCOPIC COMMENT: ABNORMAL
MONOCYTES # BLD AUTO: 1.7 K/UL (ref 0.3–1)
MONOCYTES NFR BLD: 13.6 % (ref 4–15)
NEUTROPHILS # BLD AUTO: 9.5 K/UL (ref 1.8–7.7)
NEUTROPHILS NFR BLD: 76.1 % (ref 38–73)
NITRITE UR QL STRIP: NEGATIVE
NITRITE UR QL STRIP: NEGATIVE
NON-SQ EPI CELLS #/AREA URNS AUTO: 1 /HPF
NRBC BLD-RTO: 0 /100 WBC
OHS QRS DURATION: 102 MS
OHS QRS DURATION: 94 MS
OHS QTC CALCULATION: 250 MS
OHS QTC CALCULATION: 458 MS
PCO2 BLDA: 25.7 MMHG (ref 35–45)
PH SMN: 7.3 [PH] (ref 7.35–7.45)
PH UR STRIP: 6 [PH] (ref 5–8)
PH UR STRIP: 6 [PH] (ref 5–8)
PLATELET # BLD AUTO: 256 K/UL (ref 150–450)
PMV BLD AUTO: 10.5 FL (ref 9.2–12.9)
PO2 BLDA: 41 MMHG (ref 40–60)
POC BE: -14 MMOL/L
POC IONIZED CALCIUM: 0.68 MMOL/L (ref 1.06–1.42)
POC SATURATED O2: 72 % (ref 95–100)
POC TCO2: 13 MMOL/L (ref 24–29)
POCT GLUCOSE: 139 MG/DL (ref 70–110)
POCT GLUCOSE: 164 MG/DL (ref 70–110)
POCT GLUCOSE: 188 MG/DL (ref 70–110)
POTASSIUM BLD-SCNC: <2 MMOL/L (ref 3.5–5.1)
POTASSIUM SERPL-SCNC: 4.4 MMOL/L (ref 3.5–5.1)
POTASSIUM SERPL-SCNC: 4.6 MMOL/L (ref 3.5–5.1)
PROT SERPL-MCNC: 6.8 G/DL (ref 6–8.4)
PROT UR QL STRIP: ABNORMAL
PROT UR QL STRIP: ABNORMAL
RBC # BLD AUTO: 3.28 M/UL (ref 4–5.4)
RBC #/AREA URNS AUTO: 43 /HPF (ref 0–4)
RBC #/AREA URNS AUTO: 6 /HPF (ref 0–4)
SAMPLE: ABNORMAL
SITE: ABNORMAL
SODIUM BLD-SCNC: 150 MMOL/L (ref 136–145)
SODIUM SERPL-SCNC: 133 MMOL/L (ref 136–145)
SODIUM SERPL-SCNC: 134 MMOL/L (ref 136–145)
SP GR UR STRIP: 1.02 (ref 1–1.03)
SP GR UR STRIP: 1.02 (ref 1–1.03)
SQUAMOUS #/AREA URNS AUTO: 10 /HPF
SQUAMOUS #/AREA URNS AUTO: 2 /HPF
TROPONIN I SERPL DL<=0.01 NG/ML-MCNC: 0.02 NG/ML (ref 0–0.03)
URN SPEC COLLECT METH UR: ABNORMAL
URN SPEC COLLECT METH UR: ABNORMAL
UUN UR-MCNC: 476 MG/DL (ref 140–1050)
WBC # BLD AUTO: 12.52 K/UL (ref 3.9–12.7)
WBC #/AREA URNS AUTO: 11 /HPF (ref 0–5)
WBC #/AREA URNS AUTO: 16 /HPF (ref 0–5)
YEAST UR QL AUTO: ABNORMAL

## 2024-09-30 PROCEDURE — 97530 THERAPEUTIC ACTIVITIES: CPT

## 2024-09-30 PROCEDURE — 27000221 HC OXYGEN, UP TO 24 HOURS

## 2024-09-30 PROCEDURE — 85025 COMPLETE CBC W/AUTO DIFF WBC: CPT | Performed by: HOSPITALIST

## 2024-09-30 PROCEDURE — 93005 ELECTROCARDIOGRAM TRACING: CPT

## 2024-09-30 PROCEDURE — 99900035 HC TECH TIME PER 15 MIN (STAT)

## 2024-09-30 PROCEDURE — 25000003 PHARM REV CODE 250: Performed by: HOSPITALIST

## 2024-09-30 PROCEDURE — 36415 COLL VENOUS BLD VENIPUNCTURE: CPT | Performed by: HOSPITALIST

## 2024-09-30 PROCEDURE — 84484 ASSAY OF TROPONIN QUANT: CPT

## 2024-09-30 PROCEDURE — 82803 BLOOD GASES ANY COMBINATION: CPT

## 2024-09-30 PROCEDURE — 94761 N-INVAS EAR/PLS OXIMETRY MLT: CPT | Mod: XB

## 2024-09-30 PROCEDURE — 80048 BASIC METABOLIC PNL TOTAL CA: CPT | Mod: XB | Performed by: INTERNAL MEDICINE

## 2024-09-30 PROCEDURE — 25000003 PHARM REV CODE 250: Performed by: INTERNAL MEDICINE

## 2024-09-30 PROCEDURE — 93010 ELECTROCARDIOGRAM REPORT: CPT | Mod: ,,, | Performed by: INTERNAL MEDICINE

## 2024-09-30 PROCEDURE — 80053 COMPREHEN METABOLIC PANEL: CPT | Performed by: HOSPITALIST

## 2024-09-30 PROCEDURE — 97110 THERAPEUTIC EXERCISES: CPT

## 2024-09-30 PROCEDURE — 97162 PT EVAL MOD COMPLEX 30 MIN: CPT

## 2024-09-30 PROCEDURE — 87086 URINE CULTURE/COLONY COUNT: CPT | Performed by: INTERNAL MEDICINE

## 2024-09-30 PROCEDURE — 83735 ASSAY OF MAGNESIUM: CPT | Performed by: INTERNAL MEDICINE

## 2024-09-30 PROCEDURE — 36415 COLL VENOUS BLD VENIPUNCTURE: CPT

## 2024-09-30 PROCEDURE — 97165 OT EVAL LOW COMPLEX 30 MIN: CPT

## 2024-09-30 PROCEDURE — 63600175 PHARM REV CODE 636 W HCPCS: Performed by: INTERNAL MEDICINE

## 2024-09-30 PROCEDURE — 36415 COLL VENOUS BLD VENIPUNCTURE: CPT | Mod: XB | Performed by: INTERNAL MEDICINE

## 2024-09-30 PROCEDURE — G0378 HOSPITAL OBSERVATION PER HR: HCPCS

## 2024-09-30 PROCEDURE — 81001 URINALYSIS AUTO W/SCOPE: CPT | Performed by: INTERNAL MEDICINE

## 2024-09-30 PROCEDURE — 99223 1ST HOSP IP/OBS HIGH 75: CPT | Mod: ,,, | Performed by: INTERNAL MEDICINE

## 2024-09-30 RX ORDER — FUROSEMIDE 10 MG/ML
80 INJECTION INTRAMUSCULAR; INTRAVENOUS ONCE
Status: COMPLETED | OUTPATIENT
Start: 2024-09-30 | End: 2024-09-30

## 2024-09-30 RX ORDER — AMOXICILLIN AND CLAVULANATE POTASSIUM 500; 125 MG/1; MG/1
1 TABLET, FILM COATED ORAL EVERY 12 HOURS
Status: DISCONTINUED | OUTPATIENT
Start: 2024-09-30 | End: 2024-10-01

## 2024-09-30 RX ADMIN — FUROSEMIDE 80 MG: 10 INJECTION, SOLUTION INTRAVENOUS at 08:09

## 2024-09-30 RX ADMIN — RANOLAZINE 500 MG: 500 TABLET, EXTENDED RELEASE ORAL at 08:09

## 2024-09-30 RX ADMIN — CARVEDILOL 6.25 MG: 6.25 TABLET, FILM COATED ORAL at 08:09

## 2024-09-30 RX ADMIN — APIXABAN 5 MG: 5 TABLET, FILM COATED ORAL at 08:09

## 2024-09-30 RX ADMIN — CLOPIDOGREL BISULFATE 75 MG: 75 TABLET ORAL at 08:09

## 2024-09-30 RX ADMIN — AMOXICILLIN AND CLAVULANATE POTASSIUM 500 MG: 500; 125 TABLET, FILM COATED ORAL at 10:09

## 2024-09-30 RX ADMIN — LORAZEPAM 0.5 MG: 0.5 TABLET ORAL at 05:09

## 2024-09-30 RX ADMIN — MONTELUKAST 10 MG: 10 TABLET, FILM COATED ORAL at 08:09

## 2024-09-30 RX ADMIN — APIXABAN 2.5 MG: 2.5 TABLET, FILM COATED ORAL at 08:09

## 2024-09-30 RX ADMIN — DULOXETINE HYDROCHLORIDE 20 MG: 20 CAPSULE, DELAYED RELEASE ORAL at 09:09

## 2024-09-30 RX ADMIN — ATORVASTATIN CALCIUM 10 MG: 10 TABLET, FILM COATED ORAL at 08:09

## 2024-09-30 RX ADMIN — AMOXICILLIN AND CLAVULANATE POTASSIUM 500 MG: 500; 125 TABLET, FILM COATED ORAL at 02:09

## 2024-09-30 RX ADMIN — GABAPENTIN 300 MG: 300 CAPSULE ORAL at 08:09

## 2024-09-30 RX ADMIN — PANTOPRAZOLE SODIUM 40 MG: 40 TABLET, DELAYED RELEASE ORAL at 08:09

## 2024-09-30 RX ADMIN — CARVEDILOL 6.25 MG: 6.25 TABLET, FILM COATED ORAL at 05:09

## 2024-09-30 NOTE — SUBJECTIVE & OBJECTIVE
Past Medical History:   Diagnosis Date    Acute on chronic diastolic (congestive) heart failure 11/20/2019    Anticoagulant long-term use     on Plavix since May 2015    Anxiety     Arthritis     Asthma     Atrial fibrillation     Atrial fibrillation with RVR 10/19/2020    Cataracts, bilateral     Chest pain, atypical     Chronic atrial fibrillation with rapid ventricular response 06/23/2017    Colon polyp     Coronary artery disease     Diabetes mellitus     Dry eyes     Esophageal erosions     General anesthetics causing adverse effect in therapeutic use     GERD (gastroesophageal reflux disease)     Heart murmur     Hemorrhoid     High cholesterol     Hypertension     Irritable bowel syndrome     Paroxysmal atrial fibrillation 10/30/2020    Persistent atrial fibrillation 02/10/2020    Shingles 2015    Stenosis of aortic and mitral valves     Stroke     TIA (transient ischemic attack)     Use of cane as ambulatory aid        Past Surgical History:   Procedure Laterality Date    ABDOMINAL SURGERY      stents to SMA    angiocele      2007, 2014    AORTIC VALVE REPLACEMENT N/A 11/19/2019    Procedure: Replacement-valve-aortic;  Surgeon: Jack Capone MD;  Location: [facility] CATH LAB;  Service: Cardiology;  Laterality: N/A;    BREAST SURGERY      left--- a lump--- no cancer    CARDIAC VALVE SURGERY      COLONOSCOPY  2014    COLONOSCOPY N/A 3/14/2018    Procedure: COLONOSCOPY;  Surgeon: Efren Kemp MD;  Location: [facility] ENDO (4TH FLR);  Service: Endoscopy;  Laterality: N/A;  ok to hold Plavix 5 days prior to procedure per Dr RESHMA Hernandez     ok per Dr Kemp to hold Eliquis 2 days prior to procedure (see telephone encounter dated-2/7/18)    CORONARY ANGIOGRAPHY N/A 2/15/2024    Procedure: ANGIOGRAM, CORONARY ARTERY;  Surgeon: Jos Baptiste MD;  Location: [facility] CATH LAB;  Service: Cardiology;  Laterality: N/A;    HERNIA REPAIR      HYSTERECTOMY  partial    1982 partial hysterectomy    IMPLANTATION OF BIVENTRICULAR PERMANENT  PACEMAKER AS UPGRADE TO EXISTING PACEMAKER Left 5/29/2024    Procedure: Biventricular pacemaker upgrade;  Surgeon: Chencho Moeller MD;  Location: [facility] EP LAB;  Service: Cardiology;  Laterality: Left;  CHF, AF,  CRTP ugrd, BIO, MAC, [place], 3prep ** BIO dcPPM in situ/ Contrast Prep**    LEFT HEART CATHETERIZATION Right 11/5/2019    Procedure: Left heart cath;  Surgeon: Jack Capone MD;  Location: [facility] CATH LAB;  Service: Cardiology;  Laterality: Right;    left nasal polyp      left neck lymph node      nose polyp      PHLEBOGRAPHY Left 5/1/2024    Procedure: Venogram;  Surgeon: Chencho Moeller MD;  Location: [facility] EP LAB;  Service: Cardiology;  Laterality: Left;  HF, Venogram ( Lt arm) , OH, 3 prep ** BIO dcPPM in situ/ Contrast Prep**    right hip fatty mass tissue      stent to small intestine      SUPERIOR VENA CAVA ANGIOPLASTY / STENTING      TONSILLECTOMY      TOTAL ABDOMINAL HYSTERECTOMY      2014    TOTAL KNEE ARTHROPLASTY Bilateral     TREATMENT OF CARDIAC ARRHYTHMIA N/A 2/10/2020    Procedure: CARDIOVERSION;  Surgeon: Jayy Parrish MD;  Location: [facility] EP LAB;  Service: Cardiology;  Laterality: N/A;  AF, PEDRO, DCCV, MAC, DM, 3 Prep    TREATMENT OF CARDIAC ARRHYTHMIA N/A 5/15/2020    Procedure: CARDIOVERSION;  Surgeon: Hany Bee MD;  Location: [facility] EP LAB;  Service: Cardiology;  Laterality: N/A;  AF, PEDRO, DCCV, MAC, DM, 3 Prep    UPPER GASTROINTESTINAL ENDOSCOPY  2014       Review of patient's allergies indicates:   Allergen Reactions    Celebrex [celecoxib] Shortness Of Breath    Ciprofloxacin Swelling     lip    Dicyclomine Hives    Adhesive Dermatitis    Avelox [moxifloxacin] Swelling    Cilostazol Other (See Comments)     Elevates blood pressure    Eryc [erythromycin] Other (See Comments)     unknown    Iodine and iodide containing products Hives    Keflex [cephalexin] Hives    Meclomen      rashes    Meloxicam      Ear ringing    Metoclopramide Other (See Comments)     High blood pressure    Sulfa  (sulfonamide antibiotics) Itching       Current Facility-Administered Medications on File Prior to Encounter   Medication    0.9%  NaCl infusion    sodium chloride 0.9% flush 5 mL    vancomycin (VANCOCIN) 1,000 mg in dextrose 5 % (D5W) 250 mL IVPB (Vial-Mate)     Current Outpatient Medications on File Prior to Encounter   Medication Sig    acetaminophen (TYLENOL) 650 MG TbSR Take 1,300 mg by mouth 2 (two) times daily as needed.    albuterol-ipratropium (DUO-NEB) 2.5 mg-0.5 mg/3 mL nebulizer solution Take 3 mLs by nebulization every 4 (four) hours as needed for Wheezing or Shortness of Breath.    amoxicillin-clavulanate 875-125mg (AUGMENTIN) 875-125 mg per tablet Take 1 tablet by mouth every 12 (twelve) hours. for 3 days    apixaban (ELIQUIS) 5 mg Tab Take 1 tablet (5 mg total) by mouth 2 (two) times daily.    ascorbic acid, vitamin C, (VITAMIN C) 500 MG tablet Take 1,000 mg by mouth once daily.     atorvastatin (LIPITOR) 10 MG tablet Take 1 tablet (10 mg total) by mouth once daily.    calcium carbonate/vitamin D3 (CALTRATE 600 + D ORAL) Take 1 tablet by mouth once daily.    carvediloL (COREG) 6.25 MG tablet Take 1 tablet (6.25 mg total) by mouth 2 (two) times daily with meals.    clopidogreL (PLAVIX) 75 mg tablet Take 1 tablet (75 mg total) by mouth once daily.    DULoxetine (CYMBALTA) 20 MG capsule Take 1 capsule (20 mg total) by mouth once daily.    estradioL (ESTRACE) 0.01 % (0.1 mg/gram) vaginal cream Place vaginally.    fexofenadine (ALLEGRA) 60 MG tablet Take 60 mg by mouth once daily.    furosemide (LASIX) 40 MG tablet Take 1 tablet (40 mg total) by mouth 2 (two) times a day.    gabapentin (NEURONTIN) 600 MG tablet Take 600 mg by mouth 3 (three) times daily.    HYDROcodone-acetaminophen (NORCO) 5-325 mg per tablet Take 1 tablet by mouth every 4 (four) hours as needed for Pain. (Patient not taking: Reported on 9/24/2024)    isosorbide mononitrate (IMDUR) 60 MG 24 hr tablet Take 1 tablet (60 mg total) by mouth  once daily.    LORazepam (ATIVAN) 0.5 MG tablet Take 1 tablet (0.5 mg total) by mouth every morning.    losartan (COZAAR) 50 MG Tab Take 1 tablet (50 mg total) by mouth once daily.    metFORMIN (GLUCOPHAGE) 500 MG tablet Take 1 tablet (500 mg total) by mouth 2 (two) times daily.    montelukast (SINGULAIR) 10 mg tablet Take 10 mg by mouth once daily.    pantoprazole (PROTONIX) 40 MG tablet Take 1 tablet (40 mg total) by mouth once daily.    ranolazine (RANEXA) 1,000 mg Tb12 Take 1 tablet (1,000 mg total) by mouth 2 (two) times daily.     Family History       Problem Relation (Age of Onset)    Heart attack Mother    Heart failure Brother    Stroke Father          Tobacco Use    Smoking status: Never    Smokeless tobacco: Never   Substance and Sexual Activity    Alcohol use: No    Drug use: Never    Sexual activity: Not Currently     Partners: Male     Review of Systems   Constitutional:  Positive for fatigue. Negative for activity change, appetite change, chills, fever and unexpected weight change.   HENT:  Negative for congestion and sore throat.    Respiratory:  Negative for cough and shortness of breath.    Cardiovascular:  Negative for chest pain, palpitations and leg swelling.   Gastrointestinal:  Negative for abdominal distention, abdominal pain, blood in stool, constipation, diarrhea, nausea and vomiting.   Genitourinary:  Negative for difficulty urinating, dysuria and hematuria.   Musculoskeletal:  Positive for arthralgias. Negative for myalgias.   Skin:  Negative for color change and rash.   Neurological:  Positive for light-headedness. Negative for dizziness, tremors and seizures.     Objective:     Vital Signs (Most Recent):  Temp: 97.6 °F (36.4 °C) (09/29/24 1921)  Pulse: 60 (09/29/24 1921)  Resp: 18 (09/29/24 1921)  BP: 130/60 (09/29/24 1921)  SpO2: 96 % (09/29/24 1921) Vital Signs (24h Range):  Temp:  [97.6 °F (36.4 °C)-98.4 °F (36.9 °C)] 97.6 °F (36.4 °C)  Pulse:  [59-93] 60  Resp:  [18-22] 18  SpO2:   [96 %-100 %] 96 %  BP: ()/(48-75) 130/60     Weight: 90.3 kg (199 lb)  Body mass index is 34.16 kg/m².     Physical Exam  Vitals reviewed.   Constitutional:       General: She is not in acute distress.     Appearance: She is well-developed.   HENT:      Head: Normocephalic and atraumatic.   Eyes:      Extraocular Movements: Extraocular movements intact.      Pupils: Pupils are equal, round, and reactive to light.   Neck:      Vascular: No JVD.      Trachea: No tracheal deviation.   Cardiovascular:      Rate and Rhythm: Normal rate and regular rhythm.      Heart sounds: Murmur heard.      No friction rub. No gallop.   Pulmonary:      Effort: No respiratory distress.      Breath sounds: Rales present. No wheezing.   Abdominal:      General: Bowel sounds are normal. There is no distension.      Palpations: Abdomen is soft. There is no mass.      Tenderness: There is no abdominal tenderness.   Musculoskeletal:         General: No deformity.      Cervical back: Neck supple.   Lymphadenopathy:      Cervical: No cervical adenopathy.   Skin:     General: Skin is warm and dry.      Findings: No rash.   Neurological:      Mental Status: She is alert and oriented to person, place, and time.              CRANIAL NERVES     CN III, IV, VI   Pupils are equal, round, and reactive to light.       Significant Labs: All pertinent labs within the past 24 hours have been reviewed.    Significant Imaging: I have reviewed all pertinent imaging results/findings within the past 24 hours.

## 2024-09-30 NOTE — PLAN OF CARE
Problem: Occupational Therapy  Goal: Occupational Therapy Goal  Description: Goals to be met by: 10/30/2024     Patient will increase functional independence with ADLs by performing:    UE Dressing with Minimal Assistance.  LE Dressing with Minimal Assistance with use of adaptive equipment as needed.  Grooming while standing with Minimal Assistance.  Toileting from toilet/BSC with Minimal Assistance for hygiene and clothing management.   Toilet transfer to toilet/BSC with Moderate Assistance.  Supine to sit with Moderate Assistance.  Functional mobility to simulate household distances with Moderate Assistance and utilizing LRAD in order to maximize functional activity tolerance and standing balance required for engagement in occupations of choice.    DME Justifications:    Rolling Walker- Patient demonstrates a mobility limitation that significantly impairs their ability to participate in one or more mobility related activities of daily living. Patient's mobility limitation cannot be sufficiently resolved with the use of a cane, but can be sufficiently resolved with the use of a rolling walker.The use of a rolling walker will considerably improve their ability to participate in MRADLs. Patient will use the walker on a regular basis at home.      Outcome: Progressing

## 2024-09-30 NOTE — H&P
[facility] - Cardiology [facility] Medicine  History & Physical    Patient Name: Adriana Strong  MRN: [id number]  Patient Class: OP- Observation  Admission Date: 9/29/2024  Attending Physician: uRddy Boyd MD   Primary Care Provider: Krzysztof Mcgraw MD         Patient information was obtained from patient, past medical records, and ER records.     Subjective:     Principal Problem:TACOS (acute kidney injury)    Chief Complaint:   Chief Complaint   Patient presents with    Weakness     Arrives via EMS from home. D/c yesterday for CHF, UTI. C/o low B/P at home.         HPI: 80 yo F with PMHx HF with recovered EF, hx of TAVR, Afib on elqiuis, HLD, HTN, and COPD on home 2L O2 who presents to the ED complaining of weakness/generalized malaise since her discharge yesterday. Pt. Was just admitted to this facility 9/24-9/28 for for decompensated heart failure. She was diuresed for 5 days with IV lasix, and she reports that she was feeeling well at discharge yesterday. Today, however, she woke up with lightheadedness and agtigue. She felt very weak when trying to just get out of bed and move around her house. She noted her BP was lower than usual (reported to be 80's/50's, so she came back to the ED for further evaluation. She denies any fevers, chills, SOB, chest pain, or palpitations.    Past Medical History:   Diagnosis Date    Acute on chronic diastolic (congestive) heart failure 11/20/2019    Anticoagulant long-term use     on Plavix since May 2015    Anxiety     Arthritis     Asthma     Atrial fibrillation     Atrial fibrillation with RVR 10/19/2020    Cataracts, bilateral     Chest pain, atypical     Chronic atrial fibrillation with rapid ventricular response 06/23/2017    Colon polyp     Coronary artery disease     Diabetes mellitus     Dry eyes     Esophageal erosions     General anesthetics causing adverse effect in therapeutic use     GERD (gastroesophageal reflux disease)     Heart murmur     Hemorrhoid      High cholesterol     Hypertension     Irritable bowel syndrome     Paroxysmal atrial fibrillation 10/30/2020    Persistent atrial fibrillation 02/10/2020    Shingles 2015    Stenosis of aortic and mitral valves     Stroke     TIA (transient ischemic attack)     Use of cane as ambulatory aid        Past Surgical History:   Procedure Laterality Date    ABDOMINAL SURGERY      stents to SMA    angiocele      2007, 2014    AORTIC VALVE REPLACEMENT N/A 11/19/2019    Procedure: Replacement-valve-aortic;  Surgeon: Jack Capone MD;  Location: [facility] CATH LAB;  Service: Cardiology;  Laterality: N/A;    BREAST SURGERY      left--- a lump--- no cancer    CARDIAC VALVE SURGERY      COLONOSCOPY  2014    COLONOSCOPY N/A 3/14/2018    Procedure: COLONOSCOPY;  Surgeon: Efren Kemp MD;  Location: [facility] ENDO (4TH FLR);  Service: Endoscopy;  Laterality: N/A;  ok to hold Plavix 5 days prior to procedure per Dr RESHMA Hernandez     ok per Dr Kemp to hold Eliquis 2 days prior to procedure (see telephone encounter dated-2/7/18)    CORONARY ANGIOGRAPHY N/A 2/15/2024    Procedure: ANGIOGRAM, CORONARY ARTERY;  Surgeon: Jos Baptiste MD;  Location: [facility] CATH LAB;  Service: Cardiology;  Laterality: N/A;    HERNIA REPAIR      HYSTERECTOMY  partial    1982 partial hysterectomy    IMPLANTATION OF BIVENTRICULAR PERMANENT PACEMAKER AS UPGRADE TO EXISTING PACEMAKER Left 5/29/2024    Procedure: Biventricular pacemaker upgrade;  Surgeon: Chencho Moeller MD;  Location: [facility] EP LAB;  Service: Cardiology;  Laterality: Left;  CHF, AF,  CRTP ugrd, BIO, MAC, [place], 3prep ** BIO dcPPM in situ/ Contrast Prep**    LEFT HEART CATHETERIZATION Right 11/5/2019    Procedure: Left heart cath;  Surgeon: Jack Capone MD;  Location: [facility] CATH LAB;  Service: Cardiology;  Laterality: Right;    left nasal polyp      left neck lymph node      nose polyp      PHLEBOGRAPHY Left 5/1/2024    Procedure: Venogram;  Surgeon: Chencho Moeller MD;  Location: [facility] EP LAB;  Service:  Cardiology;  Laterality: Left;  HF, Venogram ( Lt arm) , AR, 3 prep ** BIO dcPPM in situ/ Contrast Prep**    right hip fatty mass tissue      stent to small intestine      SUPERIOR VENA CAVA ANGIOPLASTY / STENTING      TONSILLECTOMY      TOTAL ABDOMINAL HYSTERECTOMY      2014    TOTAL KNEE ARTHROPLASTY Bilateral     TREATMENT OF CARDIAC ARRHYTHMIA N/A 2/10/2020    Procedure: CARDIOVERSION;  Surgeon: Jayy Parrish MD;  Location: [facility] EP LAB;  Service: Cardiology;  Laterality: N/A;  AF, PEDRO, DCCV, MAC, DM, 3 Prep    TREATMENT OF CARDIAC ARRHYTHMIA N/A 5/15/2020    Procedure: CARDIOVERSION;  Surgeon: Hany Bee MD;  Location: [facility] EP LAB;  Service: Cardiology;  Laterality: N/A;  AF, PEDRO, DCCV, MAC, DM, 3 Prep    UPPER GASTROINTESTINAL ENDOSCOPY  2014       Review of patient's allergies indicates:   Allergen Reactions    Celebrex [celecoxib] Shortness Of Breath    Ciprofloxacin Swelling     lip    Dicyclomine Hives    Adhesive Dermatitis    Avelox [moxifloxacin] Swelling    Cilostazol Other (See Comments)     Elevates blood pressure    Eryc [erythromycin] Other (See Comments)     unknown    Iodine and iodide containing products Hives    Keflex [cephalexin] Hives    Meclomen      rashes    Meloxicam      Ear ringing    Metoclopramide Other (See Comments)     High blood pressure    Sulfa (sulfonamide antibiotics) Itching       Current Facility-Administered Medications on File Prior to Encounter   Medication    0.9%  NaCl infusion    sodium chloride 0.9% flush 5 mL    vancomycin (VANCOCIN) 1,000 mg in dextrose 5 % (D5W) 250 mL IVPB (Vial-Mate)     Current Outpatient Medications on File Prior to Encounter   Medication Sig    acetaminophen (TYLENOL) 650 MG TbSR Take 1,300 mg by mouth 2 (two) times daily as needed.    albuterol-ipratropium (DUO-NEB) 2.5 mg-0.5 mg/3 mL nebulizer solution Take 3 mLs by nebulization every 4 (four) hours as needed for Wheezing or Shortness of Breath.    amoxicillin-clavulanate 875-125mg  (AUGMENTIN) 875-125 mg per tablet Take 1 tablet by mouth every 12 (twelve) hours. for 3 days    apixaban (ELIQUIS) 5 mg Tab Take 1 tablet (5 mg total) by mouth 2 (two) times daily.    ascorbic acid, vitamin C, (VITAMIN C) 500 MG tablet Take 1,000 mg by mouth once daily.     atorvastatin (LIPITOR) 10 MG tablet Take 1 tablet (10 mg total) by mouth once daily.    calcium carbonate/vitamin D3 (CALTRATE 600 + D ORAL) Take 1 tablet by mouth once daily.    carvediloL (COREG) 6.25 MG tablet Take 1 tablet (6.25 mg total) by mouth 2 (two) times daily with meals.    clopidogreL (PLAVIX) 75 mg tablet Take 1 tablet (75 mg total) by mouth once daily.    DULoxetine (CYMBALTA) 20 MG capsule Take 1 capsule (20 mg total) by mouth once daily.    estradioL (ESTRACE) 0.01 % (0.1 mg/gram) vaginal cream Place vaginally.    fexofenadine (ALLEGRA) 60 MG tablet Take 60 mg by mouth once daily.    furosemide (LASIX) 40 MG tablet Take 1 tablet (40 mg total) by mouth 2 (two) times a day.    gabapentin (NEURONTIN) 600 MG tablet Take 600 mg by mouth 3 (three) times daily.    HYDROcodone-acetaminophen (NORCO) 5-325 mg per tablet Take 1 tablet by mouth every 4 (four) hours as needed for Pain. (Patient not taking: Reported on 9/24/2024)    isosorbide mononitrate (IMDUR) 60 MG 24 hr tablet Take 1 tablet (60 mg total) by mouth once daily.    LORazepam (ATIVAN) 0.5 MG tablet Take 1 tablet (0.5 mg total) by mouth every morning.    losartan (COZAAR) 50 MG Tab Take 1 tablet (50 mg total) by mouth once daily.    metFORMIN (GLUCOPHAGE) 500 MG tablet Take 1 tablet (500 mg total) by mouth 2 (two) times daily.    montelukast (SINGULAIR) 10 mg tablet Take 10 mg by mouth once daily.    pantoprazole (PROTONIX) 40 MG tablet Take 1 tablet (40 mg total) by mouth once daily.    ranolazine (RANEXA) 1,000 mg Tb12 Take 1 tablet (1,000 mg total) by mouth 2 (two) times daily.     Family History       Problem Relation (Age of Onset)    Heart attack Mother    Heart failure  Brother    Stroke Father          Tobacco Use    Smoking status: Never    Smokeless tobacco: Never   Substance and Sexual Activity    Alcohol use: No    Drug use: Never    Sexual activity: Not Currently     Partners: Male     Review of Systems   Constitutional:  Positive for fatigue. Negative for activity change, appetite change, chills, fever and unexpected weight change.   HENT:  Negative for congestion and sore throat.    Respiratory:  Negative for cough and shortness of breath.    Cardiovascular:  Negative for chest pain, palpitations and leg swelling.   Gastrointestinal:  Negative for abdominal distention, abdominal pain, blood in stool, constipation, diarrhea, nausea and vomiting.   Genitourinary:  Negative for difficulty urinating, dysuria and hematuria.   Musculoskeletal:  Positive for arthralgias. Negative for myalgias.   Skin:  Negative for color change and rash.   Neurological:  Positive for light-headedness. Negative for dizziness, tremors and seizures.     Objective:     Vital Signs (Most Recent):  Temp: 97.6 °F (36.4 °C) (09/29/24 1921)  Pulse: 60 (09/29/24 1921)  Resp: 18 (09/29/24 1921)  BP: 130/60 (09/29/24 1921)  SpO2: 96 % (09/29/24 1921) Vital Signs (24h Range):  Temp:  [97.6 °F (36.4 °C)-98.4 °F (36.9 °C)] 97.6 °F (36.4 °C)  Pulse:  [59-93] 60  Resp:  [18-22] 18  SpO2:  [96 %-100 %] 96 %  BP: ()/(48-75) 130/60     Weight: 90.3 kg (199 lb)  Body mass index is 34.16 kg/m².     Physical Exam  Vitals reviewed.   Constitutional:       General: She is not in acute distress.     Appearance: She is well-developed.   HENT:      Head: Normocephalic and atraumatic.   Eyes:      Extraocular Movements: Extraocular movements intact.      Pupils: Pupils are equal, round, and reactive to light.   Neck:      Vascular: No JVD.      Trachea: No tracheal deviation.   Cardiovascular:      Rate and Rhythm: Normal rate and regular rhythm.      Heart sounds: Murmur heard.      No friction rub. No gallop.    Pulmonary:      Effort: No respiratory distress.      Breath sounds: Rales present. No wheezing.   Abdominal:      General: Bowel sounds are normal. There is no distension.      Palpations: Abdomen is soft. There is no mass.      Tenderness: There is no abdominal tenderness.   Musculoskeletal:         General: No deformity.      Cervical back: Neck supple.   Lymphadenopathy:      Cervical: No cervical adenopathy.   Skin:     General: Skin is warm and dry.      Findings: No rash.   Neurological:      Mental Status: She is alert and oriented to person, place, and time.              CRANIAL NERVES     CN III, IV, VI   Pupils are equal, round, and reactive to light.       Significant Labs: All pertinent labs within the past 24 hours have been reviewed.    Significant Imaging: I have reviewed all pertinent imaging results/findings within the past 24 hours.  Assessment/Plan:     * TACOS (acute kidney injury)  -Cr 1.9 on presentation, baseline <1, was normal 1 day prior. Reports hypotension at home and noted to have low-normal BP in ED  -Urine studies pending, plan to hold home BP meds and lasix  for tonight and gently hydrate  -Renally dose medications and continue to trend Cr while admitted    Chronic respiratory failure with hypoxia  -Pt. Stable on home 2 L NC. CXR notes mild pulm edema and L sided pleural effusion, consider CT chest for further work-up if respiratory status worsening.    Acute on chronic combined systolic and diastolic congestive heart failure  - on presentation      Chronic heart failure with preserved ejection fraction  -TTE showed normal EF, Grade III DD, TAVR, CVP was elevated at the time  -CXR today shows persistent L sided effusion, mild pulm edema, similar to prior. Pt. Stable on home O2 without respiratory distress  -Gentle hydration given in ED in setting of hypotension/TACOS. Continue to monitor volume status closely and restart lasix when appropriate      Permanent atrial  fibrillation  -Pt. In paced rhythm on admit, continue to monitor on telemetry. Continue home eliquis for AC    Essential hypertension  -Pt. BP low-normal with TACOS on presentation. Hold home BP meds for now and restart GDMT meds as tolerated.    Type 2 diabetes mellitus  -A1c 4.9 and has been persistently in normal range.  Hold metformin, consider stopping at discharge given new renal dysfunction with normal A1c      VTE Risk Mitigation (From admission, onward)           Ordered     apixaban tablet 5 mg  2 times daily         09/29/24 1859                       On 09/29/2024, patient should be placed in hospital observation services under my care.             Elijah Hernandez MD  Department of Hospital Medicine  [facility] - Cardiology Stepdown

## 2024-09-30 NOTE — ASSESSMENT & PLAN NOTE
-Pt. Stable on home 2 L NC. CXR notes mild pulm edema and L sided pleural effusion, consider CT chest for further work-up if respiratory status worsening.

## 2024-09-30 NOTE — PLAN OF CARE
Problem: Adult Inpatient Plan of Care  Goal: Plan of Care Review  Outcome: Progressing     Problem: Adult Inpatient Plan of Care  Goal: Absence of Hospital-Acquired Illness or Injury  Outcome: Progressing     Problem: Diabetes Comorbidity  Goal: Blood Glucose Level Within Targeted Range  Outcome: Progressing     Problem: Acute Kidney Injury/Impairment  Goal: Fluid and Electrolyte Balance  Outcome: Progressing     Problem: Wound  Goal: Optimal Coping  Outcome: Progressing     Problem: Skin Injury Risk Increased  Goal: Skin Health and Integrity  Outcome: Progressing     Problem: Fall Injury Risk  Goal: Absence of Fall and Fall-Related Injury  Outcome: Progressing     Patient is AAOX4, VSS, NAD. Plan of care reviewed and explained. Patient verbalized understanding. Fall precautions maintained. Bed in low and locked position. Side rails up x 2, call light within reach.

## 2024-09-30 NOTE — CARE UPDATE
Patient reviewed.  No apparent wheezing noted at bedside, shortness of breath improved.  Still significant Creps to auscultation.  Dark urine noted.  We will obtain urinalysis reflex urine culture.  She is currently be treating for UTI, continue with Augmentin, renally dosed.  We will obtain stat BMP to assess kidney functions to determine further room for diuresis and if needed, Nephrology and Cardiology consult.  Fluid restrict    9/30  Repeat BMP noted.  We will obtain Nephrology consult

## 2024-09-30 NOTE — PROGRESS NOTES
[facility] Cardiology [facility] Medicine  Progress Note    Patient Name: Adriana Strong  MRN: [id number]  Patient Class: OP- Observation   Admission Date: 9/29/2024  Length of Stay: 0 days  Attending Physician: Ruddy Boyd MD  Primary Care Provider: Krzysztof Mcgraw MD        Subjective:     Principal Problem:Chronic heart failure with preserved ejection fraction        HPI:  80 yo F with PMHx HF with recovered EF, hx of TAVR, Afib on elqiuis, HLD, HTN, and COPD on home 2L O2 who presents to the ED complaining of weakness/generalized malaise since her discharge yesterday. Pt. Was just admitted to this facility 9/24-9/28 for for decompensated heart failure. She was diuresed for 5 days with IV lasix, and she reports that she was feeeling well at discharge yesterday. Today, however, she woke up with lightheadedness and agtigue. She felt very weak when trying to just get out of bed and move around her house. She noted her BP was lower than usual (reported to be 80's/50's, so she came back to the ED for further evaluation. She denies any fevers, chills, SOB, chest pain, or palpitations.    Overview/Hospital Course:  No notes on file    Interval History:  Patient received a total of 1 L of IV fluids yesterday evening into this morning, subsequently had shortness of breath and wheezing.  She was admitted due to hypotension with TACOS, blood pressures has improved    Review of Systems   Unable to perform ROS: Other     Objective:     Vital Signs (Most Recent):  Temp: 97.8 °F (36.6 °C) (09/30/24 1149)  Pulse: 60 (09/30/24 1149)  Resp: 18 (09/30/24 1149)  BP: (!) 119/57 (09/30/24 1149)  SpO2: (!) 94 % (09/30/24 1149) Vital Signs (24h Range):  Temp:  [97.5 °F (36.4 °C)-99.2 °F (37.3 °C)] 97.8 °F (36.6 °C)  Pulse:  [58-93] 60  Resp:  [16-22] 18  SpO2:  [94 %-100 %] 94 %  BP: ()/(48-75) 119/57     Weight: 95.2 kg (209 lb 14.1 oz)  Body mass index is 36.03 kg/m².    Intake/Output Summary (Last 24 hours) at 9/30/2024  1207  Last data filed at 9/30/2024 0729  Gross per 24 hour   Intake 970 ml   Output 210 ml   Net 760 ml           Physical Exam  Constitutional:       General: She is not in mild respiratory distress.     Appearance: She is obese.  With Overt Wheezing  HENT:      Head: Normocephalic.      Right Ear: External ear normal.      Left Ear: External ear normal.      Nose: Nose normal.      Comments: Nasal cannula  Eyes:      General: No scleral icterus.  Cardiovascular:      Rate and Rhythm: Normal rate.   Pulmonary:      Breath sounds: Significant Wheezing and rales present.   Abdominal:      Palpations: Abdomen is soft.      Tenderness: There is no abdominal tenderness.   Musculoskeletal:      Right lower leg: Edema present.      Left lower leg: Edema present.   Neurological:      General: No focal deficit present.      Mental Status: She is alert. Mental status is at baseline.         Significant Labs: All pertinent labs within the past 24 hours have been reviewed.    Significant Imaging: I have reviewed all pertinent imaging results/findings within the past 24 hours.    Assessment/Plan:      * Chronic heart failure with preserved ejection fraction  -TTE showed normal EF, Grade III DD, TAVR, CVP was elevated at the time  -CXR today shows persistent L sided effusion, mild pulm edema, similar to prior. Pt. Stable on home O2 without respiratory distress  -Gentle hydration given in ED in setting of hypotension/TACOS. Continue to monitor volume status closely and restart lasix when appropriate    9/30  Received 1 L of IV fluids and subsequently had significant clinical pulmonary edema, given Lasix 80 mg IV once and we will reassess how she response.  VBG as she may have some compensated alkalosis given her respiratory distress    Chronic respiratory failure with hypoxia  -Pt. Stable on home 2 L NC. CXR notes mild pulm edema and L sided pleural effusion, consider CT chest for further work-up if respiratory status  worsening.    Acute on chronic combined systolic and diastolic congestive heart failure  - on presentation      TACOS (acute kidney injury)  -Cr 1.9 on presentation, baseline <1, was normal 1 day prior. Reports hypotension at home and noted to have low-normal BP in ED  -Urine studies pending, plan to hold home BP meds and lasix  for tonight and gently hydrate  -Renally dose medications and continue to trend Cr while admitted  9/30  BP and kidney functions Improving, continue to monitor kidney functions    Permanent atrial fibrillation  -Pt. In paced rhythm on admit, continue to monitor on telemetry. Continue home eliquis for AC    Essential hypertension  -Pt. BP low-normal with TACOS on presentation. Hold home BP meds for now and restart GDMT meds as tolerated.    Type 2 diabetes mellitus  -A1c 4.9 and has been persistently in normal range.  Hold metformin, consider stopping at discharge given new renal dysfunction with normal A1c      VTE Risk Mitigation (From admission, onward)           Ordered     apixaban tablet 2.5 mg  2 times daily         09/30/24 0911                    Discharge Planning   MIMI: 10/2/2024     Code Status: Full Code   Is the patient medically ready for discharge?:     Reason for patient still in hospital (select all that apply): Patient trending condition                     Ruddy Boyd MD  Department of Hospital Medicine   [facility] - [facility] Step[facility]

## 2024-09-30 NOTE — ASSESSMENT & PLAN NOTE
-Cr 1.9 on presentation, baseline <1, was normal 1 day prior. Reports hypotension at home and noted to have low-normal BP in ED  -Urine studies pending, plan to hold home BP meds and lasix  for tonight and gently hydrate  -Renally dose medications and continue to trend Cr while admitted

## 2024-09-30 NOTE — PLAN OF CARE
PT evaluation completed- see note for details. PT POC and goals established.    Problem: Physical Therapy  Goal: Physical Therapy Goal  Description: Goals to be met by: 10/30/24     Patient will increase functional independence with mobility by performin. Supine to sit with Moderate Assistance  2. Sit to stand transfer with Moderate Assistance  3. Bed to chair transfer with Moderate Assistance using LRAD  4. Gait  x 50 feet with Moderate Assistance using LRAD.   5. Sitting at edge of bed x10 minutes with Stand-by Assistance  6. Stand for 5 minutes with Moderate Assistance using LRAD    Outcome: Progressing

## 2024-09-30 NOTE — PROGRESS NOTES
Patient arrived to the unit via stretcher. Telemetry box applied and vital signs listed below. Oriented to room, call bell with in reach, bed is in low lock position. Admit completed, plan of care initiated.   09/29/24 1921   Vital Signs   Temp 97.6 °F (36.4 °C)   Temp Source Oral   Pulse 60   Heart Rate Source Monitor   Resp 18   SpO2 96 %   Pulse Oximetry Type Intermittent   Flow (L/min) (Oxygen Therapy) 2   Device (Oxygen Therapy) nasal cannula   /60   MAP (mmHg) 87   BP Location Right arm   BP Method Automatic     Nurses Note -- 4 Eyes      Skin assessed during: Admit      [] No Altered Skin Integrity Present    []Prevention Measures Documented      [x] Yes- Altered Skin Integrity Present or Discovered   [x] LDA Added if Not in Epic (Describe Wound)   [] New Altered Skin Integrity was Present on Admit and Documented in LDA   [] Wound Image Taken    Wound Care Consulted? No    Attending Nurse:  Tru Gutiérrez RN/Staff Member:  Tracy

## 2024-09-30 NOTE — ASSESSMENT & PLAN NOTE
-Pt. BP low-normal with TACOS on presentation. Hold home BP meds for now and restart GDMT meds as tolerated.   Specimens verified per policy.

## 2024-09-30 NOTE — ASSESSMENT & PLAN NOTE
-TTE showed normal EF, Grade III DD, TAVR, CVP was elevated at the time  -CXR today shows persistent L sided effusion, mild pulm edema, similar to prior. Pt. Stable on home O2 without respiratory distress  -Gentle hydration given in ED in setting of hypotension/TACOS. Continue to monitor volume status closely and restart lasix when appropriate    9/30  Received 1 L of IV fluids and subsequently had significant clinical pulmonary edema, given Lasix 80 mg IV once and we will reassess how she response.  VBG as she may have some compensated alkalosis given her respiratory distress

## 2024-09-30 NOTE — ASSESSMENT & PLAN NOTE
-Cr 1.9 on presentation, baseline <1, was normal 1 day prior. Reports hypotension at home and noted to have low-normal BP in ED  -Urine studies pending, plan to hold home BP meds and lasix  for tonight and gently hydrate  -Renally dose medications and continue to trend Cr while admitted  9/30  BP and kidney functions Improving, continue to monitor kidney functions

## 2024-09-30 NOTE — SUBJECTIVE & OBJECTIVE
Interval History:  Patient received a total of 1 L of IV fluids yesterday evening into this morning, subsequently had shortness of breath and wheezing.  She was admitted due to hypotension with TACOS, blood pressures has improved    Review of Systems   Unable to perform ROS: Other     Objective:     Vital Signs (Most Recent):  Temp: 97.8 °F (36.6 °C) (09/30/24 1149)  Pulse: 60 (09/30/24 1149)  Resp: 18 (09/30/24 1149)  BP: (!) 119/57 (09/30/24 1149)  SpO2: (!) 94 % (09/30/24 1149) Vital Signs (24h Range):  Temp:  [97.5 °F (36.4 °C)-99.2 °F (37.3 °C)] 97.8 °F (36.6 °C)  Pulse:  [58-93] 60  Resp:  [16-22] 18  SpO2:  [94 %-100 %] 94 %  BP: ()/(48-75) 119/57     Weight: 95.2 kg (209 lb 14.1 oz)  Body mass index is 36.03 kg/m².    Intake/Output Summary (Last 24 hours) at 9/30/2024 1207  Last data filed at 9/30/2024 0729  Gross per 24 hour   Intake 970 ml   Output 210 ml   Net 760 ml           Physical Exam  Constitutional:       General: She is not in mild respiratory distress.     Appearance: She is obese.  With Overt Wheezing  HENT:      Head: Normocephalic.      Right Ear: External ear normal.      Left Ear: External ear normal.      Nose: Nose normal.      Comments: Nasal cannula  Eyes:      General: No scleral icterus.  Cardiovascular:      Rate and Rhythm: Normal rate.   Pulmonary:      Breath sounds: Significant Wheezing and rales present.   Abdominal:      Palpations: Abdomen is soft.      Tenderness: There is no abdominal tenderness.   Musculoskeletal:      Right lower leg: Edema present.      Left lower leg: Edema present.   Neurological:      General: No focal deficit present.      Mental Status: She is alert. Mental status is at baseline.         Significant Labs: All pertinent labs within the past 24 hours have been reviewed.    Significant Imaging: I have reviewed all pertinent imaging results/findings within the past 24 hours.

## 2024-09-30 NOTE — PT/OT/SLP EVAL
Occupational Therapy  Co-Evaluation and Co-Treatment with PT    Name: Adriana Strong  MRN: [id number]  Admitting Diagnosis: Chronic heart failure with preserved ejection fraction  Recent Surgery: * No surgery found *      Recommendations:     Discharge Recommendations: Moderate Intensity Therapy  Discharge Equipment Recommendations: walker, rolling  Barriers to discharge: Other (Comment) (requires increased assistance)    Assessment:     Adriana Strong is a 79 y.o. female with a medical diagnosis of Chronic heart failure with preserved ejection fraction. She presents with the following performance deficits affecting function: weakness, impaired endurance, impaired self care skills, impaired functional mobility, impaired balance, decreased upper extremity function, decreased lower extremity function, decreased safety awareness, pain, impaired cardiopulmonary response to activity. Pt agreeable to participate in therapy evaluation this AM, however evaluation limited at this date 2/2 pt with c/o dizziness sitting EOB. Pt instructed to perform purposeful ankle pumps, BP taken and reading 183/78, however pt stating dizziness is increasing. Pt returned to supine at this time. RN notified and aware. Pt requires increased A with ADLs and bed mobility at this time, as compared to reported PLOF. Currently unsafe to attempt transfers/functional mobility 2/2 pt with c/o increasing dizziness sitting EOB. Pt would benefit from continued skilled acute OT services in order to maximize IND with ADLs and functional mobility to ensure safe return to PLOF in the least restrictive environment. OT recommending moderate intensity therapy once pt is medically appropriate for d/c.    Rehab Prognosis: Good; patient would benefit from acute skilled OT services to address these deficits and reach maximum level of function.       Plan:     Patient to be seen 4 x/week to address the above listed problems via self-care/home management,  therapeutic activities, therapeutic exercises, neuromuscular re-education  Plan of Care Expires: 10/30/24  Plan of Care Reviewed with: patient, daughter    Subjective     Chief Complaint: Pt agreeable to participate in therapy this AM. Pt with c/o dizziness sitting EOB.  Patient/Family Comments/goals: To get to bedside commode.    Occupational Profile:  Living Environment: Pt lives with spouse in a 2SH with threshold ESTER. Bedroom/bathroom located downstairs. Bathroom setup includes a walk-in shower with built-in seat and grab bars  Previous level of function: PTA, pt reports she required A from her spouse with sponge baths, UB/LB dressing, and toileting. Pt states she was able to complete her grooming tasks with setup assistance. Pt reports she would ambulate household distances with rollator and A from spouse, and use wheelchair when outside the home.  Roles and Routines: Enjoys doing puzzle books  Equipment Used at Home: wheelchair, rollator, oxygen, bedside commode, grab bar (owns straight cane, however does not currently use)  Assistance upon Discharge: Spouse to A as needed    Pain/Comfort:  Pain Rating 1:  (Pt with mild c/o pain in chest area, however did not rate pain numerically.)  Pain Rating Post-Intervention 1:  (unrated)    Patients cultural, spiritual, Quaker conflicts given the current situation: no    Objective:     Communicated with: RN prior to session. Patient cleared to participate in therapy at this time. Patient found HOB elevated with telemetry, oxygen, [employer] and daughter present upon OT entry to room.    General Precautions: Standard, fall  Orthopedic Precautions: N/A  Braces: N/A  Respiratory Status: Nasal cannula, flow 1 L/min    Occupational Performance:    Bed Mobility:    Patient completed Rolling/Turning to Left with maximal assistance and use of bed rail  Patient completed Rolling/Turning to Right with maximal assistance and use of bed rail  Patient completed Supine to Sit with  maximal assistance and 2 persons  Patient completed anterior Scooting towards the EOB with maximal assistance  Patient completed Sit to Supine with maximal assistance and 2 persons    Functional Mobility/Transfers:  Currently unsafe to attempt transfers/functional mobility 2/2 pt with c/o increasing dizziness sitting EOB. Pt instructed to perform purposeful ankle pumps, BP taken and reading 183/78, however pt stating dizziness is increasing. Pt returned to supine at this time. RN notified and aware.    Activities of Daily Living:  Toileting: total assistance to place bed pan at end of session    Cognitive/Visual Perceptual:  Cognitive/Psychosocial Skills:     -       Oriented to: Person, Place, Time, and Situation   -       Follows Commands/attention: Follows multistep commands  -       Communication: clear/fluent  -       Memory: No Deficits noted  -       Safety awareness/insight to disability: impaired   -       Mood/Affect/Coping skills/emotional control: Appropriate to situation and Pleasant  Visual/Perceptual:      -       Intact visual field as noted during functional tasks  -       Noted, pt wearing glasses    Physical Exam:  Balance:    -       Static sitting balance: CGA-SBA  -       Dynamic sitting balance: CGA-SBA  Postural examination/scapula alignment:    -       Rounded shoulders  -       Forward head posture  Skin integrity: Visible skin intact  Edema: None noted  Upper Extremity Range of Motion:     -       Right Upper Extremity: WFL  -       Left Upper Extremity: WFL  Upper Extremity Strength:    -       Right Upper Extremity: 4-/5 MMT throughout UE  -       Left Upper Extremity:  4-/5 MMT throughout UE   Strength:    -       Right Upper Extremity: WFL  -       Left Upper Extremity: WFL    AMPAC 6 Click ADL:  AMPAC Total Score: 14    Treatment & Education:  Pt and daughter educated on:   Role of OT, OT POC, and discharge planning.  Safe bed mobility techniques and proper body mechanics for fall  prevention and improved independence with bed mobility.  Importance of activity to increase endurance and tolerance for increased participation in daily ADLs.  Pursed lip breathing technique.  Utilizing the call bell to request for assistance with all functional mobility to ensure safety during hospital stay.  Whiteboard updated.    Pt and daughter verbalized understanding and all questions were addressed within the scope of OT.     Patient left HOB elevated, placed on bed pan with all lines intact, call button in reach, RN notified, and daughter present.    GOALS:   Multidisciplinary Problems       Occupational Therapy Goals          Problem: Occupational Therapy    Goal Priority Disciplines Outcome Interventions   Occupational Therapy Goal     OT, PT/OT Progressing    Description: Goals to be met by: 10/30/2024     Patient will increase functional independence with ADLs by performing:    UE Dressing with Minimal Assistance.  LE Dressing with Minimal Assistance with use of adaptive equipment as needed.  Grooming while standing with Minimal Assistance.  Toileting from toilet/BSC with Minimal Assistance for hygiene and clothing management.   Toilet transfer to toilet/BSC with Moderate Assistance.  Supine to sit with Moderate Assistance.  Functional mobility to simulate household distances with Moderate Assistance and utilizing LRAD in order to maximize functional activity tolerance and standing balance required for engagement in occupations of choice.    DME Justifications:    Rolling Walker- Patient demonstrates a mobility limitation that significantly impairs their ability to participate in one or more mobility related activities of daily living. Patient's mobility limitation cannot be sufficiently resolved with the use of a cane, but can be sufficiently resolved with the use of a rolling walker.The use of a rolling walker will considerably improve their ability to participate in MRADLs. Patient will use the walker  on a regular basis at home.                         History:     Past Medical History:   Diagnosis Date    Acute on chronic diastolic (congestive) heart failure 11/20/2019    Anticoagulant long-term use     on Plavix since May 2015    Anxiety     Arthritis     Asthma     Atrial fibrillation     Atrial fibrillation with RVR 10/19/2020    Cataracts, bilateral     Chest pain, atypical     Chronic atrial fibrillation with rapid ventricular response 06/23/2017    Colon polyp     Coronary artery disease     Diabetes mellitus     Dry eyes     Esophageal erosions     General anesthetics causing adverse effect in therapeutic use     GERD (gastroesophageal reflux disease)     Heart murmur     Hemorrhoid     High cholesterol     Hypertension     Irritable bowel syndrome     Paroxysmal atrial fibrillation 10/30/2020    Persistent atrial fibrillation 02/10/2020    Shingles 2015    Stenosis of aortic and mitral valves     Stroke     TIA (transient ischemic attack)     Use of cane as ambulatory aid          Past Surgical History:   Procedure Laterality Date    ABDOMINAL SURGERY      stents to SMA    angiocele      2007, 2014    AORTIC VALVE REPLACEMENT N/A 11/19/2019    Procedure: Replacement-valve-aortic;  Surgeon: Jack Capone MD;  Location: [facility] CATH LAB;  Service: Cardiology;  Laterality: N/A;    BREAST SURGERY      left--- a lump--- no cancer    CARDIAC VALVE SURGERY      COLONOSCOPY  2014    COLONOSCOPY N/A 3/14/2018    Procedure: COLONOSCOPY;  Surgeon: Efren Kemp MD;  [facility]  Service: Endoscopy;  Laterality: N/A;  ok to hold Plavix 5 days prior to procedure per Dr RESHMA Hernandez     ok per Dr Kemp to hold Eliquis 2 days prior to procedure (see telephone encounter dated-2/7/18)    CORONARY ANGIOGRAPHY N/A 2/15/2024    Procedure: ANGIOGRAM, CORONARY ARTERY;  Surgeon: Jos Baptiste MD;  Location: [facility] CATH LAB;  Service: Cardiology;  Laterality: N/A;    HERNIA REPAIR      HYSTERECTOMY  partial    1982  partial hysterectomy    IMPLANTATION OF BIVENTRICULAR PERMANENT PACEMAKER AS UPGRADE TO EXISTING PACEMAKER Left 5/29/2024    Procedure: Biventricular pacemaker upgrade;  Surgeon: Chencho Moeller MD;  Location: [facility] EP LAB;  Service: Cardiology;  Laterality: Left;  CHF, AF,  CRTP ugrd, BIO, MAC, FL, 3prep ** BIO dcPPM in situ/ Contrast Prep**    LEFT HEART CATHETERIZATION Right 11/5/2019    Procedure: Left heart cath;  Surgeon: Jack Capone MD;  Location: [facility] CATH LAB;  Service: Cardiology;  Laterality: Right;    left nasal polyp      left neck lymph node      nose polyp      PHLEBOGRAPHY Left 5/1/2024    Procedure: Venogram;  Surgeon: Chencho Moeller MD;  Location: [facility] EP LAB;  Service: Cardiology;  Laterality: Left;  HF, Venogram ( Lt arm) , FL, 3 prep ** BIO dcPPM in situ/ Contrast Prep**    right hip fatty mass tissue      stent to small intestine      SUPERIOR VENA CAVA ANGIOPLASTY / STENTING      TONSILLECTOMY      TOTAL ABDOMINAL HYSTERECTOMY      2014    TOTAL KNEE ARTHROPLASTY Bilateral     TREATMENT OF CARDIAC ARRHYTHMIA N/A 2/10/2020    Procedure: CARDIOVERSION;  Surgeon: Jayy Parrish MD;  Location: [facility] EP LAB;  Service: Cardiology;  Laterality: N/A;  AF, PEDRO, DCCV, MAC, DM, 3 Prep    TREATMENT OF CARDIAC ARRHYTHMIA N/A 5/15/2020    Procedure: CARDIOVERSION;  Surgeon: Hany Bee MD;  Location: [facility] EP LAB;  Service: Cardiology;  Laterality: N/A;  AF, PEDRO, DCCV, MAC, DM, 3 Prep    UPPER GASTROINTESTINAL ENDOSCOPY  2014       Time Tracking:     OT Date of Treatment: 09/30/24  OT Start Time: 0826  OT Stop Time: 0850  OT Total Time (min): 24 min    Billable Minutes: Evaluation 12  Therapeutic Activity 12    Co-evaluation/treatment performed with PT due to patient's multiple deficits requiring two skilled therapists to appropriately and safely assess patient's strength, endurance, functional mobility, and ADL performance while facilitating functional tasks in addition to accommodating for  patient's activity tolerance and medical acuity.     9/30/2024

## 2024-09-30 NOTE — PLAN OF CARE
Pt maintained free from falls/trauma/injuries and further skin breakdown. Patient had complaint of chest pain around 0515 that has been constant. Reproductive with deep breath and applied pressure. STAT EKG ordered and provider notified. Administered PRN lorazepam per patient request.    Problem: Adult Inpatient Plan of Care  Goal: Plan of Care Review  Outcome: Progressing  Goal: Patient-Specific Goal (Individualized)  Outcome: Progressing  Goal: Absence of Hospital-Acquired Illness or Injury  Outcome: Progressing  Goal: Optimal Comfort and Wellbeing  Outcome: Progressing  Goal: Readiness for Transition of Care  Outcome: Progressing     Problem: Diabetes Comorbidity  Goal: Blood Glucose Level Within Targeted Range  Outcome: Progressing     Problem: Acute Kidney Injury/Impairment  Goal: Fluid and Electrolyte Balance  Outcome: Progressing  Goal: Improved Oral Intake  Outcome: Progressing  Goal: Effective Renal Function  Outcome: Progressing     Problem: Wound  Goal: Optimal Coping  Outcome: Progressing  Goal: Optimal Functional Ability  Outcome: Progressing  Goal: Absence of Infection Signs and Symptoms  Outcome: Progressing  Goal: Improved Oral Intake  Outcome: Progressing  Goal: Optimal Pain Control and Function  Outcome: Progressing  Goal: Skin Health and Integrity  Outcome: Progressing  Goal: Optimal Wound Healing  Outcome: Progressing     Problem: Skin Injury Risk Increased  Goal: Skin Health and Integrity  Outcome: Progressing     Problem: Fall Injury Risk  Goal: Absence of Fall and Fall-Related Injury  Outcome: Progressing

## 2024-09-30 NOTE — ASSESSMENT & PLAN NOTE
-TTE showed normal EF, Grade III DD, TAVR, CVP was elevated at the time  -CXR today shows persistent L sided effusion, mild pulm edema, similar to prior. Pt. Stable on home O2 without respiratory distress  -Gentle hydration given in ED in setting of hypotension/TACOS. Continue to monitor volume status closely and restart lasix when appropriate

## 2024-09-30 NOTE — ASSESSMENT & PLAN NOTE
-A1c 4.9 and has been persistently in normal range.  Hold metformin, consider stopping at discharge given new renal dysfunction with normal A1c

## 2024-09-30 NOTE — PLAN OF CARE
[facility] - Cardiology Stepdown  Initial Discharge Assessment       Primary Care Provider: Krzysztof Mcgraw MD    Admission Diagnosis: Hyponatremia [E87.1]  Weakness [R53.1]  Macrocytic anemia [D53.9]  TACOS (acute kidney injury) [N17.9]  Acute kidney injury [N17.9]    Admission Date: 9/29/2024  Expected Discharge Date: 10/2/2024    Transition of Care Barriers: None    Payor: [employer] [facility] / Plan: [employer] CHOICES / Product Type: Medicare Advantage /     Extended Emergency Contact Information  Primary Emergency Contact: Anthony Strong  Address: [address] [place] [address] [facility]  Home Phone: [phone]  Mobile Phone: [phone]  Relation: Spouse  Secondary Emergency Contact: JustoTammy  Address: [address] [place] [address] [place]  Mobile Phone: [phone]  Relation: Daughter    Discharge Plan A: Home Health, Home with family  Discharge Plan B: Skilled Nursing Facility      [id number] [address] [facility]  [address] [address]  Phone: [phone] Fax: [phone]      Initial Assessment (most recent)       Adult Discharge Assessment - 09/30/24 1348          Discharge Assessment    Assessment Type Discharge Planning Assessment     Confirmed/corrected address, phone number and insurance Yes     Confirmed Demographics Correct on Facesheet     Source of Information patient;family;health record     Communicated MIMI with patient/caregiver Yes     Reason For Admission Acute CHF     People in Home spouse     Facility Arrived From: Home     Do you expect to return to your current living situation? Yes     Do you have help at home or someone to help you manage your care at home? Yes     Who are your caregiver(s) and their phone number(s)? Anthony Strong () [phone]     Prior to hospitilization cognitive status: Alert/Oriented     Current cognitive  status: Alert/Oriented     Walking or Climbing Stairs Difficulty yes     Walking or Climbing Stairs ambulation difficulty, requires equipment     Mobility Management rolling walker, wheelchair, quad cane, straight cane     Dressing/Bathing Difficulty yes     Dressing/Bathing bathing difficulty, assistance 1 person;dressing difficulty, assistance 1 person     Dressing/Bathing Management  assist, bedside commode     Home Layout Able to live on 1st floor     Equipment Currently Used at Home wheelchair;walker, rolling;cane, straight;cane, quad;bedside commode;oxygen     Readmission within 30 days? Yes     Patient currently being followed by outpatient case management? No     Do you currently have service(s) that help you manage your care at home? Yes     Name and Contact number of agency [facility] ([phone])     Is the pt/caregiver preference to resume services with current agency Yes     Do you take prescription medications? Yes     Do you have prescription coverage? Yes     Do you have any problems affording any of your prescribed medications? No     Is the patient taking medications as prescribed? yes     Who is going to help you get home at discharge? Anthony Strong () [phone]     How do you get to doctors appointments? family or friend will provide     Are you on dialysis? No     Do you take coumadin? No     Discharge Plan A Home Health;Home with family     Discharge Plan B Skilled Nursing Facility     DME Needed Upon Discharge  none     Discharge Plan discussed with: Patient;Spouse/sig other;Adult children     Name(s) and Number(s) Anthony Strong () [phone]     Transition of Care Barriers None                     Readmission Assessment (most recent)       Readmission Assessment - 09/30/24 1355          Readmission    Was this a planned readmission? No     Why were you hospitalized in the last 30 days? Acute CHF     Why were you readmitted? Alarmed about signs/symptoms     When you  "left the hospital how did you feel? "Alright"     When you left the hospital where did you go? Home with Home Health     Did patient/caregiver refused recommended DC plan? No     Tell me about what happened between when you left the hospital and the day you returned. Woke up with lightheadedness and felt weak; low blood pressure     When did you start not feeling well? Same night as discharge     Did you try to manage your symptoms your self? No     Did you call anyone? Yes     Who did you call? Other (comments)   EMS    Did you try to see or did see a doctor or nurse before you came? No                    SW met with pt,  Anthony, and daughter Flores at bedside to discuss discharge planning.  Pt lives with her  and uses cane, RW, and wheelchair for ambulation and gets assistance from her  for ADLs.  Current with [facility].  No dialysis or coumadin.  Pt will have transportation and assistance from her  at discharge.  Discharge Plan A and Plan B have been determined by review of patient's clinical status, future medical and therapeutic needs, and coverage/benefits for post-acute care in coordination with multidisciplinary team members.  SHARYN name and ext on whiteboard; discharge planning booklet provided.  Will continue to follow.      Lissette Brown LMSW  [facility]  [id number]            "

## 2024-09-30 NOTE — PT/OT/SLP EVAL
Physical Therapy Co-Evaluation and Co-Treatment    Patient Name:  Adriana Strong   MRN:  [id number]  Admit Date: 9/29/2024  Admitting Diagnosis:  TACOS (acute kidney injury)   Length of Stay: 0 days  Recent Surgery: * No surgery found *      Recommendations:     Discharge Recommendations:  Moderate Intensity Therapy     Discharge Equipment Recommendations: walker, rolling   Barriers to discharge: Inaccessible home, Decreased caregiver support, and increased need for skilled assistance    Plan:     During this hospitalization, patient to be seen 4 x/week to address the identified rehab impairments via gait training, therapeutic activities, therapeutic exercises, neuromuscular re-education and progress towards the established goals.  Plan of Care Expires:  10/30/24  Plan of Care Reviewed with: patient, daughter    Assessment:     Adriana Strong is a 79 y.o. female admitted with a medical diagnosis of TACOS (acute kidney injury). Pt found supine agreeable to therapy session. Pt reports walking short distances in the home with rollator and assist PTA. Pt tolerated session fairly this date but limited by dizziness sitting EOB. Pt required increased assistance with all bed mobility this date, but able to sit EOB with SBA-CGA. Pt with increased BP while sitting EOB and reported increasing dizziness so returned to supine for pt safety (BP taken- 183/78 mmHg). Plan to continue to assess pt mobility in future sessions. Patient would benefit from skilled therapy services to maximize safety and independence, increase activity tolerance, decrease fall risk, decrease caregiver burden, improve QOL, improve patient's functional mobility, and decrease risk of contractures and pressure sores. Patient currently demonstrates a need for moderate intensity therapy on a daily basis post acute secondary to a decline in functional status due to illness     Problem List: weakness, impaired endurance, impaired self care skills, impaired  functional mobility, impaired cardiopulmonary response to activity, decreased safety awareness, pain, decreased lower extremity function, decreased upper extremity function.  Rehab Prognosis: Good; patient would benefit from acute skilled PT services to address these deficits and reach maximum level of function.      Goals:   Multidisciplinary Problems       Physical Therapy Goals          Problem: Physical Therapy    Goal Priority Disciplines Outcome Interventions   Physical Therapy Goal     PT, PT/OT Progressing    Description: Goals to be met by: 10/30/24     Patient will increase functional independence with mobility by performin. Supine to sit with Moderate Assistance  2. Sit to stand transfer with Moderate Assistance  3. Bed to chair transfer with Moderate Assistance using LRAD  4. Gait  x 50 feet with Moderate Assistance using LRAD.   5. Sitting at edge of bed x10 minutes with Stand-by Assistance  6. Stand for 5 minutes with Moderate Assistance using LRAD                         Subjective     RN notified prior to session. Daughter present upon PT entrance into room. Patient agreeable to PT evaluation.    Chief Complaint: dizziness; chest pain  Patient/Family Comments/goals: increase strength  Pain/Comfort:  Pain Rating 1: other (see comments) (unrated)  Location - Orientation 1: generalized  Location 1: chest  Pain Addressed 1: Distraction, Reposition (RN aware per pt)    Social History:  Residence: lives with their spouse 2-story house with threshold ESTER; pt's B&B on 1st floor. Pt's bathroom has a WIS with built in bench.  Support available: family  Equipment Owned (not using): cane, straight  Equipment Used: wheelchair, rollator, oxygen, bedside commode  Prior level of function: pt walks short distances in the home with rollator and assist; pt uses w/c for community distances; pt requires assist from spouse for bathing (sponge baths recently), dressing and toileting      Patient reports they will  "have assistance from spouse upon discharge.    Objective:     Additional staff present: OT; OT for co-evaluation due to suspected patient need for two skilled therapists to appropriately assess patient's functional deficits as well as ensure patient safety, accommodate for limited activity tolerance, and provide appropriate, skilled assistance to maximize functional potential during evaluation.    Patient found supine with: telemetry, oxygen, PureWick     General Precautions: Standard, Cardiac fall   Orthopedic Precautions:N/A   Braces: N/A   Body mass index is 36.03 kg/m².  Oxygen Device: Nasal Cannula 2L  Vitals: BP (!) 151/67 (BP Location: Left arm, Patient Position: Lying)   Pulse (!) 59   Temp 99.2 °F (37.3 °C) (Oral)   Resp 20   Ht 5' 4" (1.626 m)   Wt 95.2 kg (209 lb 14.1 oz)   LMP 01/21/1973 (LMP Unknown)   SpO2 95%   Breastfeeding No   BMI 36.03 kg/m²       Exams:    Cognition:  Oriented X 4, Alert, and Cooperative  Command following: Follows multistep verbal commands  Communication: clear/fluent    Sensation:   Light touch sensation: Intact BLEs    Gross Motor Coordination: No deficits noted during functional mobility tasks     Edema/Skin Integrity: None noted; Visible skin intact    Postural examination/scapula alignment: Rounded shoulder and Head forward    Lower Extremity Range of Motion:  Right Lower Extremity: WFL  Left Lower Extremity: WFL    Lower Extremity Strength:  Right Lower Extremity: Deficits: grossly 4/5 in all major muscle groups   Left Lower Extremity: Deficits: grossly 4/5 in all major muscle groups       Functional Mobility:    Bed Mobility:  Rolling to right with maximal assistance  Rolling to left with maximal assistance  Scooting with maximal assistance  Supine > Sit with maximal assistance and 2 persons  Sit > Supine with maximal assistance and 2 persons    Transfers:   Sit <> Stand Transfer: Not performed 2/2 pt safety           Balance:  Sitting Balance  Static: stand by " assistance  Dynamic: contact guard assistance  Comments: Pt with reports of increasing dizziness while sitting EOB ultimately returned to supine (/78 mmHg).     Therapeutic exercise performed in the form of bed mobility and sitting balance to increase patient's activity tolerance and functional strength    Outcome Measures:  AM-PAC 6 CLICK MOBILITY  Turning over in bed (including adjusting bedclothes, sheets and blankets)?: 2  Sitting down on and standing up from a chair with arms (e.g., wheelchair, bedside commode, etc.): 1  Moving from lying on back to sitting on the side of the bed?: 2  Moving to and from a bed to a chair (including a wheelchair)?: 1  Need to walk in hospital room?: 1  Climbing 3-5 steps with a railing?: 1  Basic Mobility Total Score: 8     Education:  Time provided for education, counseling and discussion of health disposition in regards to patient's current status  All questions answered within PT scope of practice and to patient's satisfaction  PT role in POC to address current functional deficits  Call nursing/pct to transfer to chair/use bathroom. Pt stated understanding.    Patient left supine with all lines intact, call button in reach, RN notified, and daughter present.      History:     Past Medical History:   Diagnosis Date    Acute on chronic diastolic (congestive) heart failure 11/20/2019    Anticoagulant long-term use     on Plavix since May 2015    Anxiety     Arthritis     Asthma     Atrial fibrillation     Atrial fibrillation with RVR 10/19/2020    Cataracts, bilateral     Chest pain, atypical     Chronic atrial fibrillation with rapid ventricular response 06/23/2017    Colon polyp     Coronary artery disease     Diabetes mellitus     Dry eyes     Esophageal erosions     General anesthetics causing adverse effect in therapeutic use     GERD (gastroesophageal reflux disease)     Heart murmur     Hemorrhoid     High cholesterol     Hypertension     Irritable bowel syndrome      Paroxysmal atrial fibrillation 10/30/2020    Persistent atrial fibrillation 02/10/2020    Shingles 2015    Stenosis of aortic and mitral valves     Stroke     TIA (transient ischemic attack)     Use of cane as ambulatory aid        Past Surgical History:   Procedure Laterality Date    ABDOMINAL SURGERY      stents to SMA    angiocele      2007, 2014    AORTIC VALVE REPLACEMENT N/A 11/19/2019    Procedure: Replacement-valve-aortic;  Surgeon: Jack Capone MD;  Location: [facility] CATH LAB;  Service: Cardiology;  Laterality: N/A;    BREAST SURGERY      left--- a lump--- no cancer    CARDIAC VALVE SURGERY      COLONOSCOPY  2014    COLONOSCOPY N/A 3/14/2018    Procedure: COLONOSCOPY;  Surgeon: Efren Kemp MD;  [facility]  Service: Endoscopy;  Laterality: N/A;  ok to hold Plavix 5 days prior to procedure per Dr RESHMA Hernandez     ok per Dr Kemp to hold Eliquis 2 days prior to procedure (see telephone encounter dated-2/7/18)    CORONARY ANGIOGRAPHY N/A 2/15/2024    Procedure: ANGIOGRAM, CORONARY ARTERY;  Surgeon: Jos Baptiste MD;  Location: [facility] CATH LAB;  Service: Cardiology;  Laterality: N/A;    HERNIA REPAIR      HYSTERECTOMY  partial    1982 partial hysterectomy    IMPLANTATION OF BIVENTRICULAR PERMANENT PACEMAKER AS UPGRADE TO EXISTING PACEMAKER Left 5/29/2024    Procedure: Biventricular pacemaker upgrade;  Surgeon: Chencho Moeller MD;  Location: [facility] EP LAB;  Service: Cardiology;  Laterality: Left;  CHF, AF,  CRTP ugrd, BIO, MAC, WA, 3prep ** BIO dcPPM in situ/ Contrast Prep**    LEFT HEART CATHETERIZATION Right 11/5/2019    Procedure: Left heart cath;  Surgeon: Jack Capone MD;  Location: [facility] CATH LAB;  Service: Cardiology;  Laterality: Right;    left nasal polyp      left neck lymph node      nose polyp      PHLEBOGRAPHY Left 5/1/2024    Procedure: Venogram;  Surgeon: Chencho Moeller MD;  Location: [facility] EP LAB;  Service: Cardiology;  Laterality: Left;  HF, Venogram ( Lt arm) , WA, 3 prep **  BIO dcPPM in situ/ Contrast Prep**    right hip fatty mass tissue      stent to small intestine      SUPERIOR VENA CAVA ANGIOPLASTY / STENTING      TONSILLECTOMY      TOTAL ABDOMINAL HYSTERECTOMY      2014    TOTAL KNEE ARTHROPLASTY Bilateral     TREATMENT OF CARDIAC ARRHYTHMIA N/A 2/10/2020    Procedure: CARDIOVERSION;  Surgeon: Jayy Parrish MD;  Location: [facility] EP LAB;  Service: Cardiology;  Laterality: N/A;  AF, PEDRO, DCCV, MAC, DM, 3 Prep    TREATMENT OF CARDIAC ARRHYTHMIA N/A 5/15/2020    Procedure: CARDIOVERSION;  Surgeon: Hany Bee MD;  Location: [facility] EP LAB;  Service: Cardiology;  Laterality: N/A;  AF, PEDRO, DCCV, MAC, DM, 3 Prep    UPPER GASTROINTESTINAL ENDOSCOPY  2014       Family History   Problem Relation Name Age of Onset    Heart attack Mother      Stroke Father      Heart failure Brother      Colon cancer Neg Hx      Inflammatory bowel disease Neg Hx         Social History     Socioeconomic History    Marital status:    Tobacco Use    Smoking status: Never    Smokeless tobacco: Never   Substance and Sexual Activity    Alcohol use: No    Drug use: Never    Sexual activity: Not Currently     Partners: Male     Social Drivers of Health     Financial Resource Strain: Low Risk  (9/25/2024)    Overall Financial Resource Strain (CARDIA)     Difficulty of Paying Living Expenses: Not hard at all   Food Insecurity: No Food Insecurity (9/25/2024)    Hunger Vital Sign     Worried About Running Out of Food in the Last Year: Never true     Ran Out of Food in the Last Year: Never true   Transportation Needs: No Transportation Needs (9/25/2024)    TRANSPORTATION NEEDS     Transportation : No   Physical Activity: Insufficiently Active (9/25/2024)    Exercise Vital Sign     Days of Exercise per Week: 7 days     Minutes of Exercise per Session: 10 min   Stress: No Stress Concern Present (9/25/2024)    [employer] [place] [employer] - Occupational Stress Questionnaire     Feeling of Stress : Only  a little   Housing Stability: Low Risk  (9/25/2024)    Housing Stability Vital Sign     Unable to Pay for Housing in the Last Year: No     Homeless in the Last Year: No       Time Tracking:     PT Received On: 09/30/24  PT Start Time: 0826     PT Stop Time: 0852  PT Total Time (min): 26 min     Billable Minutes: Evaluation 18 minutes and Therapeutic Exercise 8 minutes    9/30/2024

## 2024-10-01 LAB
ALBUMIN SERPL BCP-MCNC: 3.1 G/DL (ref 3.5–5.2)
ALLENS TEST: ABNORMAL
ALP SERPL-CCNC: 54 U/L (ref 55–135)
ALT SERPL W/O P-5'-P-CCNC: 41 U/L (ref 10–44)
ANION GAP SERPL CALC-SCNC: 10 MMOL/L (ref 8–16)
ANION GAP SERPL CALC-SCNC: 12 MMOL/L (ref 8–16)
AST SERPL-CCNC: 68 U/L (ref 10–40)
BACTERIA UR CULT: NORMAL
BACTERIA UR CULT: NORMAL
BASOPHILS # BLD AUTO: 0.03 K/UL (ref 0–0.2)
BASOPHILS NFR BLD: 0.2 % (ref 0–1.9)
BILIRUB SERPL-MCNC: 0.6 MG/DL (ref 0.1–1)
BUN SERPL-MCNC: 44 MG/DL (ref 8–23)
BUN SERPL-MCNC: 49 MG/DL (ref 8–23)
CALCIUM SERPL-MCNC: 9.6 MG/DL (ref 8.7–10.5)
CALCIUM SERPL-MCNC: 9.7 MG/DL (ref 8.7–10.5)
CHLORIDE SERPL-SCNC: 91 MMOL/L (ref 95–110)
CHLORIDE SERPL-SCNC: 92 MMOL/L (ref 95–110)
CO2 SERPL-SCNC: 28 MMOL/L (ref 23–29)
CO2 SERPL-SCNC: 31 MMOL/L (ref 23–29)
CREAT SERPL-MCNC: 0.9 MG/DL (ref 0.5–1.4)
CREAT SERPL-MCNC: 1.1 MG/DL (ref 0.5–1.4)
DELSYS: ABNORMAL
DIFFERENTIAL METHOD BLD: ABNORMAL
EOSINOPHIL # BLD AUTO: 0 K/UL (ref 0–0.5)
EOSINOPHIL NFR BLD: 0 % (ref 0–8)
ERYTHROCYTE [DISTWIDTH] IN BLOOD BY AUTOMATED COUNT: 15.9 % (ref 11.5–14.5)
EST. GFR  (NO RACE VARIABLE): 50.5 ML/MIN/1.73 M^2
EST. GFR  (NO RACE VARIABLE): >60 ML/MIN/1.73 M^2
FLOW: 2
GLUCOSE SERPL-MCNC: 179 MG/DL (ref 70–110)
GLUCOSE SERPL-MCNC: 97 MG/DL (ref 70–110)
HCO3 UR-SCNC: 37.2 MMOL/L (ref 24–28)
HCT VFR BLD AUTO: 29 % (ref 37–48.5)
HGB BLD-MCNC: 9.4 G/DL (ref 12–16)
IMM GRANULOCYTES # BLD AUTO: 0.05 K/UL (ref 0–0.04)
IMM GRANULOCYTES NFR BLD AUTO: 0.4 % (ref 0–0.5)
LYMPHOCYTES # BLD AUTO: 1.5 K/UL (ref 1–4.8)
LYMPHOCYTES NFR BLD: 11.9 % (ref 18–48)
MCH RBC QN AUTO: 29.3 PG (ref 27–31)
MCHC RBC AUTO-ENTMCNC: 32.4 G/DL (ref 32–36)
MCV RBC AUTO: 90 FL (ref 82–98)
MODE: ABNORMAL
MONOCYTES # BLD AUTO: 1.8 K/UL (ref 0.3–1)
MONOCYTES NFR BLD: 14.9 % (ref 4–15)
NEUTROPHILS # BLD AUTO: 8.9 K/UL (ref 1.8–7.7)
NEUTROPHILS NFR BLD: 72.6 % (ref 38–73)
NRBC BLD-RTO: 0 /100 WBC
OHS QRS DURATION: 86 MS
OHS QTC CALCULATION: 416 MS
PCO2 BLDA: 54.4 MMHG (ref 35–45)
PH SMN: 7.44 [PH] (ref 7.35–7.45)
PLATELET # BLD AUTO: 251 K/UL (ref 150–450)
PMV BLD AUTO: 10.5 FL (ref 9.2–12.9)
PO2 BLDA: 83 MMHG (ref 80–100)
POC BE: 13 MMOL/L
POC SATURATED O2: 96 % (ref 95–100)
POC TCO2: 39 MMOL/L (ref 23–27)
POCT GLUCOSE: 138 MG/DL (ref 70–110)
POTASSIUM SERPL-SCNC: 4 MMOL/L (ref 3.5–5.1)
POTASSIUM SERPL-SCNC: 4.4 MMOL/L (ref 3.5–5.1)
PROT SERPL-MCNC: 7 G/DL (ref 6–8.4)
RBC # BLD AUTO: 3.21 M/UL (ref 4–5.4)
SAMPLE: ABNORMAL
SITE: ABNORMAL
SODIUM SERPL-SCNC: 131 MMOL/L (ref 136–145)
SODIUM SERPL-SCNC: 133 MMOL/L (ref 136–145)
WBC # BLD AUTO: 12.23 K/UL (ref 3.9–12.7)

## 2024-10-01 PROCEDURE — 25000242 PHARM REV CODE 250 ALT 637 W/ HCPCS: Performed by: NURSE PRACTITIONER

## 2024-10-01 PROCEDURE — 94761 N-INVAS EAR/PLS OXIMETRY MLT: CPT | Mod: XB

## 2024-10-01 PROCEDURE — 85025 COMPLETE CBC W/AUTO DIFF WBC: CPT | Performed by: HOSPITALIST

## 2024-10-01 PROCEDURE — 36600 WITHDRAWAL OF ARTERIAL BLOOD: CPT

## 2024-10-01 PROCEDURE — 94640 AIRWAY INHALATION TREATMENT: CPT

## 2024-10-01 PROCEDURE — 36415 COLL VENOUS BLD VENIPUNCTURE: CPT | Performed by: INTERNAL MEDICINE

## 2024-10-01 PROCEDURE — 36415 COLL VENOUS BLD VENIPUNCTURE: CPT | Performed by: HOSPITALIST

## 2024-10-01 PROCEDURE — 99900035 HC TECH TIME PER 15 MIN (STAT)

## 2024-10-01 PROCEDURE — 27000221 HC OXYGEN, UP TO 24 HOURS

## 2024-10-01 PROCEDURE — 63600175 PHARM REV CODE 636 W HCPCS: Performed by: INTERNAL MEDICINE

## 2024-10-01 PROCEDURE — 80053 COMPREHEN METABOLIC PANEL: CPT | Performed by: HOSPITALIST

## 2024-10-01 PROCEDURE — 82803 BLOOD GASES ANY COMBINATION: CPT

## 2024-10-01 PROCEDURE — 20600001 HC STEP DOWN PRIVATE ROOM

## 2024-10-01 PROCEDURE — 25000003 PHARM REV CODE 250: Performed by: HOSPITALIST

## 2024-10-01 PROCEDURE — 99233 SBSQ HOSP IP/OBS HIGH 50: CPT | Mod: ,,, | Performed by: INTERNAL MEDICINE

## 2024-10-01 PROCEDURE — 80048 BASIC METABOLIC PNL TOTAL CA: CPT | Mod: XB | Performed by: INTERNAL MEDICINE

## 2024-10-01 PROCEDURE — 97535 SELF CARE MNGMENT TRAINING: CPT

## 2024-10-01 PROCEDURE — 25000003 PHARM REV CODE 250: Performed by: INTERNAL MEDICINE

## 2024-10-01 PROCEDURE — 63600175 PHARM REV CODE 636 W HCPCS: Performed by: NURSE PRACTITIONER

## 2024-10-01 PROCEDURE — 97530 THERAPEUTIC ACTIVITIES: CPT

## 2024-10-01 PROCEDURE — 82800 BLOOD PH: CPT

## 2024-10-01 RX ORDER — FUROSEMIDE 10 MG/ML
80 INJECTION INTRAMUSCULAR; INTRAVENOUS ONCE
Status: DISCONTINUED | OUTPATIENT
Start: 2024-10-01 | End: 2024-10-01

## 2024-10-01 RX ORDER — FUROSEMIDE 10 MG/ML
80 INJECTION INTRAMUSCULAR; INTRAVENOUS ONCE
Status: COMPLETED | OUTPATIENT
Start: 2024-10-01 | End: 2024-10-01

## 2024-10-01 RX ORDER — AMOXICILLIN AND CLAVULANATE POTASSIUM 875; 125 MG/1; MG/1
1 TABLET, FILM COATED ORAL EVERY 12 HOURS
Status: DISCONTINUED | OUTPATIENT
Start: 2024-10-01 | End: 2024-10-02

## 2024-10-01 RX ORDER — FUROSEMIDE 10 MG/ML
80 INJECTION INTRAMUSCULAR; INTRAVENOUS EVERY 12 HOURS
Status: DISCONTINUED | OUTPATIENT
Start: 2024-10-01 | End: 2024-10-05 | Stop reason: HOSPADM

## 2024-10-01 RX ORDER — IPRATROPIUM BROMIDE AND ALBUTEROL SULFATE 2.5; .5 MG/3ML; MG/3ML
3 SOLUTION RESPIRATORY (INHALATION) EVERY 4 HOURS PRN
Status: DISCONTINUED | OUTPATIENT
Start: 2024-10-01 | End: 2024-10-05 | Stop reason: HOSPADM

## 2024-10-01 RX ADMIN — FUROSEMIDE 80 MG: 10 INJECTION, SOLUTION INTRAVENOUS at 09:10

## 2024-10-01 RX ADMIN — RANOLAZINE 500 MG: 500 TABLET, EXTENDED RELEASE ORAL at 08:10

## 2024-10-01 RX ADMIN — ACETAMINOPHEN 650 MG: 325 TABLET ORAL at 10:10

## 2024-10-01 RX ADMIN — IPRATROPIUM BROMIDE AND ALBUTEROL SULFATE 3 ML: 2.5; .5 SOLUTION RESPIRATORY (INHALATION) at 10:10

## 2024-10-01 RX ADMIN — FUROSEMIDE 80 MG: 10 INJECTION, SOLUTION INTRAVENOUS at 05:10

## 2024-10-01 RX ADMIN — LORAZEPAM 0.5 MG: 0.5 TABLET ORAL at 02:10

## 2024-10-01 RX ADMIN — APIXABAN 2.5 MG: 2.5 TABLET, FILM COATED ORAL at 08:10

## 2024-10-01 RX ADMIN — FUROSEMIDE 80 MG: 10 INJECTION, SOLUTION INTRAVENOUS at 12:10

## 2024-10-01 RX ADMIN — CLOPIDOGREL BISULFATE 75 MG: 75 TABLET ORAL at 08:10

## 2024-10-01 RX ADMIN — LORAZEPAM 0.5 MG: 0.5 TABLET ORAL at 10:10

## 2024-10-01 RX ADMIN — DULOXETINE HYDROCHLORIDE 20 MG: 20 CAPSULE, DELAYED RELEASE ORAL at 08:10

## 2024-10-01 RX ADMIN — AMOXICILLIN AND CLAVULANATE POTASSIUM 1 TABLET: 875; 125 TABLET, FILM COATED ORAL at 10:10

## 2024-10-01 RX ADMIN — CARVEDILOL 6.25 MG: 6.25 TABLET, FILM COATED ORAL at 08:10

## 2024-10-01 RX ADMIN — PANTOPRAZOLE SODIUM 40 MG: 40 TABLET, DELAYED RELEASE ORAL at 08:10

## 2024-10-01 RX ADMIN — FUROSEMIDE 80 MG: 10 INJECTION, SOLUTION INTRAVENOUS at 10:10

## 2024-10-01 RX ADMIN — RANOLAZINE 500 MG: 500 TABLET, EXTENDED RELEASE ORAL at 10:10

## 2024-10-01 RX ADMIN — CARVEDILOL 6.25 MG: 6.25 TABLET, FILM COATED ORAL at 05:10

## 2024-10-01 RX ADMIN — APIXABAN 5 MG: 5 TABLET, FILM COATED ORAL at 10:10

## 2024-10-01 RX ADMIN — ATORVASTATIN CALCIUM 10 MG: 10 TABLET, FILM COATED ORAL at 08:10

## 2024-10-01 RX ADMIN — GABAPENTIN 300 MG: 300 CAPSULE ORAL at 10:10

## 2024-10-01 RX ADMIN — MONTELUKAST 10 MG: 10 TABLET, FILM COATED ORAL at 08:10

## 2024-10-01 RX ADMIN — AMOXICILLIN AND CLAVULANATE POTASSIUM 500 MG: 500; 125 TABLET, FILM COATED ORAL at 08:10

## 2024-10-01 NOTE — PLAN OF CARE
Problem: Adult Inpatient Plan of Care  Goal: Plan of Care Review  Outcome: Progressing  Goal: Patient-Specific Goal (Individualized)  Outcome: Progressing  Goal: Absence of Hospital-Acquired Illness or Injury  Outcome: Progressing  Goal: Optimal Comfort and Wellbeing  Outcome: Progressing  Goal: Readiness for Transition of Care  Outcome: Progressing     Problem: Diabetes Comorbidity  Goal: Blood Glucose Level Within Targeted Range  Outcome: Progressing     Problem: Acute Kidney Injury/Impairment  Goal: Fluid and Electrolyte Balance  Outcome: Progressing  Goal: Improved Oral Intake  Outcome: Progressing  Goal: Effective Renal Function  Outcome: Progressing     Problem: Wound  Goal: Optimal Coping  Outcome: Progressing  Goal: Optimal Functional Ability  Outcome: Progressing  Goal: Absence of Infection Signs and Symptoms  Outcome: Progressing  Goal: Improved Oral Intake  Outcome: Progressing  Goal: Optimal Pain Control and Function  Outcome: Progressing  Goal: Skin Health and Integrity  Outcome: Progressing  Goal: Optimal Wound Healing  Outcome: Progressing     Problem: Skin Injury Risk Increased  Goal: Skin Health and Integrity  Outcome: Progressing     Problem: Fall Injury Risk  Goal: Absence of Fall and Fall-Related Injury  Outcome: Progressing

## 2024-10-01 NOTE — CARE UPDATE
Patient reviewed, reports shortness of breath has improved, seen comfortable at bedside.  Breath sounds improved, still Creps noted to auscultation.  Afternoon BMP was repeated, still pending

## 2024-10-01 NOTE — PT/OT/SLP PROGRESS
Occupational Therapy   Co-Treatment  Co-treatment performed due to patient's multiple deficits requiring two skilled therapists to appropriately and safely assess patient's strength and endurance while facilitating functional tasks in addition to accommodating for patient's activity tolerance.      Name: Adriana Strong  MRN: [id number]  Admitting Diagnosis:  Chronic heart failure with preserved ejection fraction       Recommendations:     Discharge Recommendations: Moderate Intensity Therapy  Discharge Equipment Recommendations:  walker, rolling  Barriers to discharge:  Other (Comment) (increased skilled (A) required)    Assessment:     Adriana Strong is a 79 y.o. female with a medical diagnosis of Chronic heart failure with preserved ejection fraction.  She presents with the following performance deficits affecting function are weakness, impaired endurance, impaired self care skills, impaired functional mobility, gait instability, impaired balance, decreased coordination, decreased lower extremity function, decreased safety awareness, impaired cardiopulmonary response to activity, decreased upper extremity function. Pt agreeable to therapy and tolerated well. VSS throughout however pt continues to be limited by SOB/WHITLEY with seated rest break required and vc's for pursed lip breathing. Pt would continue to benefit from skilled OT services to maximize functional independence with ADLs and functional mobility, reduce caregiver burden, and facilitate safe discharge in the least restrictive environment.      Rehab Prognosis:  Good; patient would benefit from acute skilled OT services to address these deficits and reach maximum level of function.       Plan:     Patient to be seen 4 x/week to address the above listed problems via self-care/home management, therapeutic activities, therapeutic exercises, neuromuscular re-education  Plan of Care Expires: 10/30/24  Plan of Care Reviewed with: patient, son    Subjective      Chief Complaint: SOB/WHITLEY  Patient/Family Comments/goals: to get better  Pain/Comfort:  Pain Rating 1: 0/10  Pain Rating Post-Intervention 1: 0/10    Objective:   Additional staff present:  ELSY Ashraf    Communicated with: RN prior to session.  Patient found HOB elevated with oxygen, telemetry, PureWick upon OT entry to room.    General Precautions: Standard, fall    Orthopedic Precautions:N/A  Braces: N/A  Respiratory Status: Nasal cannula, flow 2 L/min     Occupational Performance:     Bed Mobility:    Patient completed Scooting/Bridging with moderate assistance and 2 persons  Patient completed Supine to Sit with minimum assistance and 2 persons     Functional Mobility/Transfers:  Patient completed Sit <> Stand Transfer with contact guard assistance  with  rolling walker   Patient completed Bed <> Chair Transfer using Stand Pivot technique with minimum assistance with rolling walker  Functional Mobility: Pt engaged in functional ambulation ~4 steps EOB>bedside chair with Min x1 and RW  No LOB  SOB noted  O2 WNL t/o    Activities of Daily Living:  Grooming: set up to complete oral hygiene seated in chair (standing g/h task deferred d/t x-ray tech present in room end of session)  Upper Body Dressing: moderate assistance required to don hospital gown over back EOB      [facility] 6 Click ADL: 15    Treatment & Education:  -Education on energy conservation and task modification to maximize safety and (I) during ADLs and mobility  -Education on importance of OOB activity to improve overall activity tolerance and promote recovery  -Pt educated to call for assistance and to transfer with hospital staff only  -Provided education regarding role of OT, POC, & discharge recommendations with pt and son verbalizing understanding.  Pt had no further questions & when asked whether there were any concerns pt reported none.     Patient left up in chair with all lines intact, call button in reach, RN notified, and son  present    GOALS:   Multidisciplinary Problems       Occupational Therapy Goals          Problem: Occupational Therapy    Goal Priority Disciplines Outcome Interventions   Occupational Therapy Goal     OT, PT/OT Progressing    Description: Goals to be met by: 10/30/2024     Patient will increase functional independence with ADLs by performing:    UE Dressing with Minimal Assistance.  LE Dressing with Minimal Assistance with use of adaptive equipment as needed.  Grooming while standing with Minimal Assistance.  Toileting from toilet/BSC with Minimal Assistance for hygiene and clothing management.   Toilet transfer to toilet/BSC with Moderate Assistance.  Supine to sit with Moderate Assistance.  Functional mobility to simulate household distances with Moderate Assistance and utilizing LRAD in order to maximize functional activity tolerance and standing balance required for engagement in occupations of choice.    DME Justifications:    Rolling Walker- Patient demonstrates a mobility limitation that significantly impairs their ability to participate in one or more mobility related activities of daily living. Patient's mobility limitation cannot be sufficiently resolved with the use of a cane, but can be sufficiently resolved with the use of a rolling walker.The use of a rolling walker will considerably improve their ability to participate in MRADLs. Patient will use the walker on a regular basis at home.                         Time Tracking:     OT Date of Treatment: 10/01/24  OT Start Time: 0843  OT Stop Time: 0900  OT Total Time (min): 17 min    Billable Minutes:Self Care/Home Management 17    OT/MELISSA: OT     Number of MELISSA visits since last OT visit: 0    10/1/2024

## 2024-10-01 NOTE — PROGRESS NOTES
[facility] - [facility] Step[facility]  Nephrology  Progress Note    Patient Name: Adriana Strong  MRN: [id number]  Admission Date: 9/29/2024  Hospital Length of Stay: 0 days  Attending Provider: Ruddy Boyd MD   Primary Care Physician: Krzysztof Mcgraw MD  Principal Problem:Chronic heart failure with preserved ejection fraction    Subjective:     HPI: 80 yo F with PMHx HF with recovered EF, hx of TAVR, Afib on elqiuis, HLD, HTN, and COPD on home 2L O2 who presents to the ED complaining of weakness/generalized malaise since her discharge yesterday. Pt. Was just admitted to this facility 9/24-9/28 for for decompensated heart failure. She was diuresed for 5 days with IV lasix, and she reports that she was feeeling well at discharge yesterday. Today, however, she woke up with lightheadedness and agtigue. She felt very weak when trying to just get out of bed and move around her house. She noted her BP was lower than usual (reported to be 80's/50's, so she came back to the ED for further evaluation. She denies any fevers, chills, SOB, chest pain, or palpitations. Nephrology were consulted to manage TACOS      Interval History: On 2.5L at home on 2L at baseline. Tolerating PO intake. CXR shows signs of pulmonary edema.     Review of patient's allergies indicates:   Allergen Reactions    Celebrex [celecoxib] Shortness Of Breath    Ciprofloxacin Swelling     lip    Dicyclomine Hives    Adhesive Dermatitis    Avelox [moxifloxacin] Swelling    Cilostazol Other (See Comments)     Elevates blood pressure    Eryc [erythromycin] Other (See Comments)     unknown    Iodine and iodide containing products Hives    Keflex [cephalexin] Hives    Meclomen      rashes    Meloxicam      Ear ringing    Metoclopramide Other (See Comments)     High blood pressure    Sulfa (sulfonamide antibiotics) Itching     Current Facility-Administered Medications   Medication Frequency    acetaminophen tablet 650 mg Q4H PRN    albuterol-ipratropium 2.5 mg-0.5 mg/3  mL nebulizer solution 3 mL Q4H PRN    amoxicillin-clavulanate 875-125mg per tablet 1 tablet Q12H    apixaban tablet 5 mg BID    atorvastatin tablet 10 mg Daily    carvediloL tablet 6.25 mg BID WM    clopidogreL tablet 75 mg Daily    dextrose 10% bolus 125 mL 125 mL PRN    dextrose 10% bolus 250 mL 250 mL PRN    DULoxetine DR capsule 20 mg Daily    furosemide injection 80 mg Q12H    gabapentin capsule 300 mg QHS    glucagon (human recombinant) injection 1 mg PRN    glucose chewable tablet 16 g PRN    glucose chewable tablet 24 g PRN    LORazepam tablet 0.5 mg Q12H PRN    melatonin tablet 6 mg Nightly PRN    montelukast tablet 10 mg Daily    naloxone 0.4 mg/mL injection 0.02 mg PRN    ondansetron injection 4 mg Q8H PRN    pantoprazole EC tablet 40 mg Daily    prochlorperazine injection Soln 5 mg Q6H PRN    ranolazine 12 hr tablet 500 mg BID    sodium chloride 0.9% flush 10 mL PRN     Facility-Administered Medications Ordered in Other Encounters   Medication Frequency    0.9%  NaCl infusion Continuous    sodium chloride 0.9% flush 5 mL PRN    vancomycin (VANCOCIN) 1,000 mg in dextrose 5 % (D5W) 250 mL IVPB (Vial-Mate) On Call Procedure       Objective:     Vital Signs (Most Recent):  Temp: 98.3 °F (36.8 °C) (10/01/24 1541)  Pulse: (!) 59 (10/01/24 1558)  Resp: 18 (10/01/24 1541)  BP: (!) 117/58 (10/01/24 1541)  SpO2: 97 % (10/01/24 1541) Vital Signs (24h Range):  Temp:  [97.6 °F (36.4 °C)-98.8 °F (37.1 °C)] 98.3 °F (36.8 °C)  Pulse:  [59-62] 59  Resp:  [18-20] 18  SpO2:  [91 %-99 %] 97 %  BP: (117-166)/(58-70) 117/58     Weight: 95.2 kg (209 lb 14.1 oz) (09/30/24 0535)  Body mass index is 36.03 kg/m².  Body surface area is 2.07 meters squared.    I/O last 3 completed shifts:  In: 1642 [P.O.:1642]  Out: 1535 [Urine:1535]     Physical Exam  Vitals and nursing note reviewed.   Constitutional:       Appearance: She is obese.   Eyes:      General: No scleral icterus.  Cardiovascular:      Rate and Rhythm: Normal rate and  regular rhythm.   Pulmonary:      Breath sounds: Wheezing and rales present.   Abdominal:      Palpations: Abdomen is soft.      Tenderness: There is no abdominal tenderness.   Musculoskeletal:      Right lower leg: Edema present.      Left lower leg: Edema present.   Neurological:      General: No focal deficit present.      Mental Status: She is oriented to person, place, and time.      Significant Labs:  All labs within the past 24 hours have been reviewed.     Significant Imaging:  Labs: Reviewed    Assessment/Plan:     Renal/  TACOS (acute kidney injury)  Acute kidney injury  Kidney function  2/2 hypotension improved with fluids and recovered BP.     Plan:    --Continue to Diurese as needed for pulmonary edema and respiratory address   - Cr 1.9 on presentation has improved to 1.1 10/1, baseline <1, was normal 1 day prior.  - Patient was recently hospitalized,  She had experienced hypotension at home and noted to have low-normal BP in ED  - Reduced oral intake, patient also received diuretics, could possibly secondary to or diuresis  - Encouraged oral intake, stop antihypertensives, monitor urine output,  - strict input/output, obtain daily CMP, replace electrolytes as indicated  - Renally dose medications and continue to trend Cr while admitted  - BP and kidney functions Improving, continue to monitor kidney functions  - We will reassess on daily basis and follow daily labs        Thank you for your consult. I will follow-up with patient. Please contact us if you have any additional questions.    Ham Mckeon MD  Nephrology  [facility] - [facility]    ATTENDING PHYSICIAN ATTESTATION  I have personally verified the history and examined the patient. I thoroughly reviewed the demographic, clinical, laboratorial and imaging information available in medical records. I agree with the assessment and recommendations provided by the subspecialty resident who was under my supervision.

## 2024-10-01 NOTE — SUBJECTIVE & OBJECTIVE
Past Medical History:   Diagnosis Date    Acute on chronic diastolic (congestive) heart failure 11/20/2019    Anticoagulant long-term use     on Plavix since May 2015    Anxiety     Arthritis     Asthma     Atrial fibrillation     Atrial fibrillation with RVR 10/19/2020    Cataracts, bilateral     Chest pain, atypical     Chronic atrial fibrillation with rapid ventricular response 06/23/2017    Colon polyp     Coronary artery disease     Diabetes mellitus     Dry eyes     Esophageal erosions     General anesthetics causing adverse effect in therapeutic use     GERD (gastroesophageal reflux disease)     Heart murmur     Hemorrhoid     High cholesterol     Hypertension     Irritable bowel syndrome     Paroxysmal atrial fibrillation 10/30/2020    Persistent atrial fibrillation 02/10/2020    Shingles 2015    Stenosis of aortic and mitral valves     Stroke     TIA (transient ischemic attack)     Use of cane as ambulatory aid        Past Surgical History:   Procedure Laterality Date    ABDOMINAL SURGERY      stents to SMA    angiocele      2007, 2014    AORTIC VALVE REPLACEMENT N/A 11/19/2019    Procedure: Replacement-valve-aortic;  Surgeon: Jack Capone MD;  Location: [facility] CATH LAB;  Service: Cardiology;  Laterality: N/A;    BREAST SURGERY      left--- a lump--- no cancer    CARDIAC VALVE SURGERY      COLONOSCOPY  2014    COLONOSCOPY N/A 3/14/2018    Procedure: COLONOSCOPY;  Surgeon: Efren Kemp MD;  Location: [facility] ENDO (4TH FLR);  Service: Endoscopy;  Laterality: N/A;  ok to hold Plavix 5 days prior to procedure per Dr RESHMA Hernandez     ok per Dr Kemp to hold Eliquis 2 days prior to procedure (see telephone encounter dated-2/7/18)    CORONARY ANGIOGRAPHY N/A 2/15/2024    Procedure: ANGIOGRAM, CORONARY ARTERY;  Surgeon: Jos Baptiste MD;  Location: [facility] CATH LAB;  Service: Cardiology;  Laterality: N/A;    HERNIA REPAIR      HYSTERECTOMY  partial    1982 partial hysterectomy    IMPLANTATION OF BIVENTRICULAR PERMANENT  PACEMAKER AS UPGRADE TO EXISTING PACEMAKER Left 5/29/2024    Procedure: Biventricular pacemaker upgrade;  Surgeon: Chencho Moeller MD;  Location: [facility] EP LAB;  Service: Cardiology;  Laterality: Left;  CHF, AF,  CRTP ugrd, BIO, MAC, OH, 3prep ** BIO dcPPM in situ/ Contrast Prep**    LEFT HEART CATHETERIZATION Right 11/5/2019    Procedure: Left heart cath;  Surgeon: Jack Capone MD;  Location: [facility] CATH LAB;  Service: Cardiology;  Laterality: Right;    left nasal polyp      left neck lymph node      nose polyp      PHLEBOGRAPHY Left 5/1/2024    Procedure: Venogram;  Surgeon: Chencho Moeller MD;  Location: [facility] EP LAB;  Service: Cardiology;  Laterality: Left;  HF, Venogram ( Lt arm) , OH, 3 prep ** BIO dcPPM in situ/ Contrast Prep**    right hip fatty mass tissue      stent to small intestine      SUPERIOR VENA CAVA ANGIOPLASTY / STENTING      TONSILLECTOMY      TOTAL ABDOMINAL HYSTERECTOMY      2014    TOTAL KNEE ARTHROPLASTY Bilateral     TREATMENT OF CARDIAC ARRHYTHMIA N/A 2/10/2020    Procedure: CARDIOVERSION;  Surgeon: Jayy Parrish MD;  Location: [facility] EP LAB;  Service: Cardiology;  Laterality: N/A;  AF, PEDRO, DCCV, MAC, DM, 3 Prep    TREATMENT OF CARDIAC ARRHYTHMIA N/A 5/15/2020    Procedure: CARDIOVERSION;  Surgeon: Hany Bee MD;  Location: [facility] EP LAB;  Service: Cardiology;  Laterality: N/A;  AF, PEDRO, DCCV, MAC, DM, 3 Prep    UPPER GASTROINTESTINAL ENDOSCOPY  2014       Review of patient's allergies indicates:   Allergen Reactions    Celebrex [celecoxib] Shortness Of Breath    Ciprofloxacin Swelling     lip    Dicyclomine Hives    Adhesive Dermatitis    Avelox [moxifloxacin] Swelling    Cilostazol Other (See Comments)     Elevates blood pressure    Eryc [erythromycin] Other (See Comments)     unknown    Iodine and iodide containing products Hives    Keflex [cephalexin] Hives    Meclomen      rashes    Meloxicam      Ear ringing    Metoclopramide Other (See Comments)     High blood pressure    Sulfa  (sulfonamide antibiotics) Itching     Current Facility-Administered Medications   Medication Frequency    acetaminophen tablet 650 mg Q4H PRN    amoxicillin-clavulanate 500-125mg per tablet 500 mg Q12H    apixaban tablet 2.5 mg BID    atorvastatin tablet 10 mg Daily    carvediloL tablet 6.25 mg BID WM    clopidogreL tablet 75 mg Daily    dextrose 10% bolus 125 mL 125 mL PRN    dextrose 10% bolus 250 mL 250 mL PRN    DULoxetine DR capsule 20 mg Daily    gabapentin capsule 300 mg QHS    glucagon (human recombinant) injection 1 mg PRN    glucose chewable tablet 16 g PRN    glucose chewable tablet 24 g PRN    LORazepam tablet 0.5 mg Q12H PRN    melatonin tablet 6 mg Nightly PRN    montelukast tablet 10 mg Daily    naloxone 0.4 mg/mL injection 0.02 mg PRN    ondansetron injection 4 mg Q8H PRN    pantoprazole EC tablet 40 mg Daily    prochlorperazine injection Soln 5 mg Q6H PRN    ranolazine 12 hr tablet 500 mg BID    sodium chloride 0.9% flush 10 mL PRN     Facility-Administered Medications Ordered in Other Encounters   Medication Frequency    0.9%  NaCl infusion Continuous    sodium chloride 0.9% flush 5 mL PRN    vancomycin (VANCOCIN) 1,000 mg in dextrose 5 % (D5W) 250 mL IVPB (Vial-Mate) On Call Procedure     Family History       Problem Relation (Age of Onset)    Heart attack Mother    Heart failure Brother    Stroke Father          Tobacco Use    Smoking status: Never    Smokeless tobacco: Never   Substance and Sexual Activity    Alcohol use: No    Drug use: Never    Sexual activity: Not Currently     Partners: Male     Review of Systems   HENT:  Negative for facial swelling and sinus pressure.    Respiratory:  Negative for cough, choking, chest tightness and shortness of breath.    Cardiovascular:  Negative for chest pain and leg swelling.   Gastrointestinal:  Positive for abdominal distention. Negative for abdominal pain and anal bleeding.     Objective:     Vital Signs (Most Recent):  Temp: 97.9 °F (36.6 °C)  (09/30/24 1513)  Pulse: (!) 59 (09/30/24 1513)  Resp: 18 (09/30/24 1513)  BP: 136/63 (09/30/24 1513)  SpO2: 95 % (09/30/24 1513) Vital Signs (24h Range):  Temp:  [97.5 °F (36.4 °C)-99.2 °F (37.3 °C)] 97.9 °F (36.6 °C)  Pulse:  [58-67] 59  Resp:  [16-20] 18  SpO2:  [94 %-99 %] 95 %  BP: (119-151)/(57-69) 136/63     Weight: 95.2 kg (209 lb 14.1 oz) (09/30/24 0535)  Body mass index is 36.03 kg/m².  Body surface area is 2.07 meters squared.    I/O last 3 completed shifts:  In: 1792 [P.O.:1542; IV Piggyback:250]  Out: 710 [Urine:710]     Physical Exam  Constitutional:       General: She is not in acute distress.     Appearance: She is ill-appearing. She is not toxic-appearing.   HENT:      Head: Normocephalic and atraumatic.   Eyes:      Extraocular Movements: Extraocular movements intact.      Pupils: Pupils are equal, round, and reactive to light.   Cardiovascular:      Rate and Rhythm: Normal rate.   Pulmonary:      Effort: No respiratory distress.      Breath sounds: No wheezing or rales.   Abdominal:      General: There is distension.      Tenderness: There is no abdominal tenderness. There is no guarding.   Musculoskeletal:      Right lower leg: No edema.      Left lower leg: No edema.   Skin:     Coloration: Skin is pale. Skin is not jaundiced.   Neurological:      Mental Status: She is alert.          Significant Labs:  CBC:   Recent Labs   Lab 09/30/24  0248 09/30/24  0941   WBC 12.52  --    RBC 3.28*  --    HGB 9.5*  --    HCT 31.2* <15*     --    MCV 95  --    MCH 29.0  --    MCHC 30.4*  --      CMP:   Recent Labs   Lab 09/30/24  0248 09/30/24  1356   * 150*   CALCIUM 9.3 9.7   ALBUMIN 3.2*  --    PROT 6.8  --    * 133*   K 4.4 4.6   CO2 32* 34*   CL 93* 90*   BUN 50* 52*   CREATININE 1.7* 1.6*   ALKPHOS 35*  --    ALT 11  --    AST 14  --    BILITOT 0.6  --      LFTs:   Recent Labs   Lab 09/30/24  0248   ALT 11   AST 14   ALKPHOS 35*   BILITOT 0.6   PROT 6.8   ALBUMIN 3.2*     PTH: No  "results for input(s): "PTH" in the last 168 hours.  TSH:   Recent Labs   Lab 09/29/24  1539   TSH 1.710     Recent Labs   Lab 09/30/24  1555   COLORU Yellow   SPECGRAV 1.020   PHUR 6.0   PROTEINUA 1+*   BACTERIA Rare   NITRITE Negative   LEUKOCYTESUR 1+*   HYALINECASTS 0     All labs within the past 24 hours have been reviewed.    Significant Imaging:   Labs reviewed    "

## 2024-10-01 NOTE — HPI
78 yo F with PMHx HF with recovered EF, hx of TAVR, Afib on elqiuis, HLD, HTN, and COPD on home 2L O2 who presents to the ED complaining of weakness/generalized malaise since her discharge yesterday. Pt. Was just admitted to this facility 9/24-9/28 for for decompensated heart failure. She was diuresed for 5 days with IV lasix, and she reports that she was feeeling well at discharge yesterday. Today, however, she woke up with lightheadedness and agtigue. She felt very weak when trying to just get out of bed and move around her house. She noted her BP was lower than usual (reported to be 80's/50's, so she came back to the ED for further evaluation. She denies any fevers, chills, SOB, chest pain, or palpitations. Nephrology were consulted to manage TACOS

## 2024-10-01 NOTE — ASSESSMENT & PLAN NOTE
Acute kidney injury  Kidney function  2/2 hypotension improved with fluids and recovered BP.     Plan:    --Continue to Diurese as needed for pulmonary edema and respiratory address   - Cr 1.9 on presentation has improved to 1.1 10/1, baseline <1, was normal 1 day prior.  - Patient was recently hospitalized,  She had experienced hypotension at home and noted to have low-normal BP in ED  - Reduced oral intake, patient also received diuretics, could possibly secondary to or diuresis  - Encouraged oral intake, stop antihypertensives, monitor urine output,  - strict input/output, obtain daily CMP, replace electrolytes as indicated  - Renally dose medications and continue to trend Cr while admitted  - BP and kidney functions Improving, continue to monitor kidney functions  - We will reassess on daily basis and follow daily labs

## 2024-10-01 NOTE — ASSESSMENT & PLAN NOTE
-TTE showed normal EF, Grade III DD, TAVR, CVP was elevated at the time  -CXR today shows persistent L sided effusion, mild pulm edema, similar to prior. Pt. Stable on home O2 without respiratory distress  -Gentle hydration given in ED in setting of hypotension/TACOS. Continue to monitor volume status closely and restart lasix when appropriate    9/30  Received 1 L of IV fluids and subsequently had significant clinical pulmonary edema, given Lasix 80 mg IV once and we will reassess how she response.  VBG as she may have some compensated alkalosis given her respiratory distress  10/1  Patient has decompensated diastolic heart failure with florid pulmonary edema noted on chest x-ray today, she does have respiratory distress on examination today.  ABG done, interpretation of possible mixed base disorder and main  of contraction alkalosis.  Given the complexities re volume status management nephrology and Cardiology were consulted.  Thus far nephrology recommends continue with 80 mg Lasix b.i.d. we will do b.i.d. BMP.  Input output monitoring and fluid restrictions for now.  Patient was previously treated for UTI, prescribed oral Augmentin by provider based on sensitivities.  Continue with Augmentin renally dosed and follow up with urine culture

## 2024-10-01 NOTE — SUBJECTIVE & OBJECTIVE
Interval History: On 2.5L at home on 2L at baseline. Tolerating PO intake. CXR shows signs of pulmonary edema.     Review of patient's allergies indicates:   Allergen Reactions    Celebrex [celecoxib] Shortness Of Breath    Ciprofloxacin Swelling     lip    Dicyclomine Hives    Adhesive Dermatitis    Avelox [moxifloxacin] Swelling    Cilostazol Other (See Comments)     Elevates blood pressure    Eryc [erythromycin] Other (See Comments)     unknown    Iodine and iodide containing products Hives    Keflex [cephalexin] Hives    Meclomen      rashes    Meloxicam      Ear ringing    Metoclopramide Other (See Comments)     High blood pressure    Sulfa (sulfonamide antibiotics) Itching     Current Facility-Administered Medications   Medication Frequency    acetaminophen tablet 650 mg Q4H PRN    albuterol-ipratropium 2.5 mg-0.5 mg/3 mL nebulizer solution 3 mL Q4H PRN    amoxicillin-clavulanate 875-125mg per tablet 1 tablet Q12H    apixaban tablet 5 mg BID    atorvastatin tablet 10 mg Daily    carvediloL tablet 6.25 mg BID WM    clopidogreL tablet 75 mg Daily    dextrose 10% bolus 125 mL 125 mL PRN    dextrose 10% bolus 250 mL 250 mL PRN    DULoxetine DR capsule 20 mg Daily    furosemide injection 80 mg Q12H    gabapentin capsule 300 mg QHS    glucagon (human recombinant) injection 1 mg PRN    glucose chewable tablet 16 g PRN    glucose chewable tablet 24 g PRN    LORazepam tablet 0.5 mg Q12H PRN    melatonin tablet 6 mg Nightly PRN    montelukast tablet 10 mg Daily    naloxone 0.4 mg/mL injection 0.02 mg PRN    ondansetron injection 4 mg Q8H PRN    pantoprazole EC tablet 40 mg Daily    prochlorperazine injection Soln 5 mg Q6H PRN    ranolazine 12 hr tablet 500 mg BID    sodium chloride 0.9% flush 10 mL PRN     Facility-Administered Medications Ordered in Other Encounters   Medication Frequency    0.9%  NaCl infusion Continuous    sodium chloride 0.9% flush 5 mL PRN    vancomycin (VANCOCIN) 1,000 mg in dextrose 5 % (D5W) 250  mL IVPB (Vial-Mate) On Call Procedure       Objective:     Vital Signs (Most Recent):  Temp: 98.3 °F (36.8 °C) (10/01/24 1541)  Pulse: (!) 59 (10/01/24 1558)  Resp: 18 (10/01/24 1541)  BP: (!) 117/58 (10/01/24 1541)  SpO2: 97 % (10/01/24 1541) Vital Signs (24h Range):  Temp:  [97.6 °F (36.4 °C)-98.8 °F (37.1 °C)] 98.3 °F (36.8 °C)  Pulse:  [59-62] 59  Resp:  [18-20] 18  SpO2:  [91 %-99 %] 97 %  BP: (117-166)/(58-70) 117/58     Weight: 95.2 kg (209 lb 14.1 oz) (09/30/24 0535)  Body mass index is 36.03 kg/m².  Body surface area is 2.07 meters squared.    I/O last 3 completed shifts:  In: 1642 [P.O.:1642]  Out: 1535 [Urine:1535]     Physical Exam  Vitals and nursing note reviewed.   Constitutional:       Appearance: She is obese.   Eyes:      General: No scleral icterus.  Cardiovascular:      Rate and Rhythm: Normal rate and regular rhythm.   Pulmonary:      Breath sounds: Wheezing and rales present.   Abdominal:      Palpations: Abdomen is soft.      Tenderness: There is no abdominal tenderness.   Musculoskeletal:      Right lower leg: Edema present.      Left lower leg: Edema present.   Neurological:      General: No focal deficit present.      Mental Status: She is oriented to person, place, and time.      Significant Labs:  All labs within the past 24 hours have been reviewed.     Significant Imaging:  Labs: Reviewed

## 2024-10-01 NOTE — PT/OT/SLP PROGRESS
Physical Therapy Co-Treatment    Patient Name:  Adriana Strong   MRN:  [id number]  Admitting Diagnosis:  Chronic heart failure with preserved ejection fraction   Recent Surgery: * No surgery found *    Admit Date: 9/29/2024  Length of Stay: 0 days    Recommendations:     Discharge Recommendations:  Moderate Intensity Therapy     Discharge Equipment Recommendations: walker, rolling   Barriers to discharge: Inaccessible home, Decreased caregiver support, and increased need for skilled assistance     Plan:     During this hospitalization, patient to be seen 4 x/week to address the identified rehab impairments via gait training, therapeutic activities, therapeutic exercises, neuromuscular re-education and progress towards the established goals.  Plan of Care Expires:  10/30/24  Plan of Care Reviewed with: patient, son    Assessment:     Adriana Strong is a 79 y.o. female admitted with a medical diagnosis of Chronic heart failure with preserved ejection fraction. Pt demo'd improvements as she completed sit to stand and step transfer to bedside chair this date with CGA-min A with RW. Pt with no dizziness reported this session but reports feeling SOB after transfer. RN entered room and requested session end due to increased RR rate despite SpO2 98% and HR WNL. Pt remains limited by decreased endurance and activity tolerance and generalized weakness. Patient would benefit from skilled therapy services to maximize safety and independence, increase activity tolerance, decrease fall risk, decrease caregiver burden, improve QOL, improve patient's functional mobility, and decrease risk of contractures and pressure sores. Patient continues to demonstrate the need for moderate intensity therapy on a daily basis post acute exhibited by decreased independence with functional mobility    Problem List: weakness, impaired endurance, impaired self care skills, impaired functional mobility, gait instability, decreased upper extremity  "function, decreased lower extremity function, impaired cardiopulmonary response to activity, decreased safety awareness.  Rehab Prognosis: Good; patient would benefit from acute skilled PT services to address these deficits and reach maximum level of function.      Goals:   Multidisciplinary Problems       Physical Therapy Goals          Problem: Physical Therapy    Goal Priority Disciplines Outcome Interventions   Physical Therapy Goal     PT, PT/OT Progressing    Description: Goals to be met by: 10/30/24     Patient will increase functional independence with mobility by performin. Supine to sit with Moderate Assistance  2. Sit to stand transfer with Moderate Assistance  3. Bed to chair transfer with Moderate Assistance using LRAD  4. Gait  x 50 feet with Moderate Assistance using LRAD.   5. Sitting at edge of bed x10 minutes with Stand-by Assistance  6. Stand for 5 minutes with Moderate Assistance using LRAD                         Subjective     RN notified prior to session. Son present upon PT entrance into room. Patient agreeable to PT treatment session.    Chief Complaint: "I just feel winded"  Patient/Family Comments/goals: increase strength and mobility   Pain/Comfort:  Pain Rating 1: 0/10  Pain Rating Post-Intervention 1: 0/10      Objective:     Additional staff present: OT; OT for cotx due to pt's multiple medical comorbidities and functional deficits req'ing two skilled therapists to appropriately maximize functional potential by progressing pt's musculoskeletal strength and endurance, facilitating neuromuscular postural balance and control ,and accommodating for patient's impaired cardiopulmonary tolerance to activities.     Patient found right sidelying with: telemetry, oxygen, PureWick   Cognition:   Alert and Cooperative  Patient is oriented to Person, Place, Time, Situation  General Precautions: Standard, Cardiac fall   Orthopedic Precautions:N/A   Braces: N/A   Body mass index is 36.03 " "kg/m².  Oxygen Device: Nasal Cannula 2L  Vitals: BP (!) 166/70 (Patient Position: Lying)   Pulse 60   Temp 98.4 °F (36.9 °C) (Oral)   Resp 18   Ht 5' 4" (1.626 m)   Wt 95.2 kg (209 lb 14.1 oz)   LMP 01/21/1973 (LMP Unknown)   SpO2 99%   Breastfeeding No   BMI 36.03 kg/m²     Outcome Measures:  AM-PAC 6 CLICK MOBILITY  Turning over in bed (including adjusting bedclothes, sheets and blankets)?: 2  Sitting down on and standing up from a chair with arms (e.g., wheelchair, bedside commode, etc.): 3  Moving from lying on back to sitting on the side of the bed?: 3  Moving to and from a bed to a chair (including a wheelchair)?: 3  Need to walk in hospital room?: 3  Climbing 3-5 steps with a railing?: 2  Basic Mobility Total Score: 16     Functional Mobility:    Bed Mobility:   Supine > Sit with minimum assistance and 2 persons    Transfers:   Sit <> Stand Transfer: contact guard assistance with rolling walker   Bed <> Chair Transfer: Step Transfer technique with minimum assistance with rolling walker    Balance:  Sitting Balance  Static: stand by assistance  Pt with x1 R lateral LOB when LUE removed from bedrail for support when BP being taken.   Dynamic: stand by assistance  Standing:  Static: contact guard assistance  Dynamic: minimum assistance      Gait:  Patient ambulated: 4' for transfer   Patient required: contact guard  Patient used:  rolling walker   Gait Deviation(s): occasional unsteady gait, decreased step length, narrow base of support, flexed posture, and decreased dany  all lines remained intact throughout ambulation trial  Comments: Patient cues for sequencing of steps and RW management during trials. Pt reports SOB after transfer- SpO2 98% on 2 L NC.     Education:  Time provided for education, counseling and discussion of health disposition in regards to patient's current status  All questions answered within PT scope of practice and to patient's satisfaction  PT role in POC to address current " functional deficits  Call nursing/pct to transfer to chair/use bathroom. Pt stated understanding.    Patient left up in chair with all lines intact, call button in reach, RN notified, and son present.    Time Tracking:     PT Received On: 10/01/24  PT Start Time: 0843     PT Stop Time: 0900  PT Total Time (min): 17 min     Billable Minutes:   Therapeutic Activity 17 minutes       PT/PTA: PT       10/1/2024

## 2024-10-01 NOTE — ASSESSMENT & PLAN NOTE
Acute kidney injury    - Cr 1.9 on presentation, baseline <1, was normal 1 day prior.  - Patient was recently hospitalized,  She had experienced hypotension at home and noted to have low-normal BP in ED  - Reduced oral intake, patient also received diuretics, could possibly secondary to or diuresis  - Encouraged oral intake, stop antihypertensives, monitor urine output,  - strict input/output, obtain daily CMP, replace electrolytes as indicated  - Renally dose medications and continue to trend Cr while admitted  - BP and kidney functions Improving, continue to monitor kidney functions  - We will reassess on daily basis and follow daily labs

## 2024-10-01 NOTE — PLAN OF CARE
#Acute hypoxic resp failure due to Acute HFpEF  #Acute kidney injury  #s/p TAVR valve    Patient is in volume overload.   Recommend aggressive diuresis until we achieve euvolemic state.   EF normal on last echo and TAVR valve hemodynamics are wnl, DI 0.57,peak AV velocity 1.6 m/s, EF 60%  Since nephrology is on board already and managing diuretic regimen, we will leave volume management up to primary team and nephrology    Please reach out with any questions

## 2024-10-01 NOTE — SUBJECTIVE & OBJECTIVE
Interval History:  Kidney functions improved and a.m. labs, received Lasix 80 mg at midnight and subsequently around 9:00 a.m. this morning.  Patient reports shortness a breath slightly improved and had a bowel movement.  ABG done, see specifics in plan.  Chest x-ray as well was done which showed florid pulmonary edema.  Nephrology and Cardiology consulted.  Gram-negative mitra growing on initial urine culture    Review of Systems   Unable to perform ROS: Other     Objective:     Vital Signs (Most Recent):  Temp: 98.4 °F (36.9 °C) (10/01/24 0737)  Pulse: 62 (10/01/24 1056)  Resp: 18 (10/01/24 0737)  BP: (!) 166/70 (10/01/24 0737)  SpO2: 99 % (10/01/24 0737) Vital Signs (24h Range):  Temp:  [97.6 °F (36.4 °C)-98.8 °F (37.1 °C)] 98.4 °F (36.9 °C)  Pulse:  [59-62] 62  Resp:  [18-20] 18  SpO2:  [91 %-99 %] 99 %  BP: (119-166)/(57-70) 166/70     Weight: 95.2 kg (209 lb 14.1 oz)  Body mass index is 36.03 kg/m².    Intake/Output Summary (Last 24 hours) at 10/1/2024 1140  Last data filed at 10/1/2024 1001  Gross per 24 hour   Intake 672 ml   Output 1625 ml   Net -953 ml         Physical Exam  Constitutional:       General: She is not in mild respiratory distress.     Appearance: She is obese.    HENT:      Head: Normocephalic.      Right Ear: External ear normal.      Left Ear: External ear normal.      Nose: Nose normal.      Comments: Nasal cannula  Eyes:      General: No scleral icterus.  Cardiovascular:      Rate and Rhythm: Normal rate.   Pulmonary:      Breath sounds: Significant Wheezing and rales present.  Wheezing has improved  Abdominal:      Palpations: Abdomen is soft.      Tenderness: There is no abdominal tenderness.   Musculoskeletal:      Right lower leg: Edema present.      Left lower leg: Edema present.   Neurological:      General: No focal deficit present.      Mental Status: She is alert. Mental status is at baseline.             Significant Labs: All pertinent labs within the past 24 hours have been  reviewed.    Significant Imaging: I have reviewed all pertinent imaging results/findings within the past 24 hours.

## 2024-10-01 NOTE — CONSULTS
[facility] - Cardiology Stepdown  Nephrology  Consult Note    Patient Name: Adriana Strong  MRN: [id number]  Admission Date: 9/29/2024  Hospital Length of Stay: 0 days  Attending Provider: Ruddy Boyd MD   Primary Care Physician: Krzysztof Mcgraw MD  Principal Problem:Chronic heart failure with preserved ejection fraction    Inpatient consult to Nephrology  Consult performed by: Zunilda Vasquez MD  Consult ordered by: Ruddy Boyd MD  Reason for consult: TACOS        Subjective:     HPI: 78 yo F with PMHx HF with recovered EF, hx of TAVR, Afib on elqiuis, HLD, HTN, and COPD on home 2L O2 who presents to the ED complaining of weakness/generalized malaise since her discharge yesterday. Pt. Was just admitted to this facility 9/24-9/28 for for decompensated heart failure. She was diuresed for 5 days with IV lasix, and she reports that she was feeeling well at discharge yesterday. Today, however, she woke up with lightheadedness and agtigue. She felt very weak when trying to just get out of bed and move around her house. She noted her BP was lower than usual (reported to be 80's/50's, so she came back to the ED for further evaluation. She denies any fevers, chills, SOB, chest pain, or palpitations. Nephrology were consulted to manage TACOS      Past Medical History:   Diagnosis Date    Acute on chronic diastolic (congestive) heart failure 11/20/2019    Anticoagulant long-term use     on Plavix since May 2015    Anxiety     Arthritis     Asthma     Atrial fibrillation     Atrial fibrillation with RVR 10/19/2020    Cataracts, bilateral     Chest pain, atypical     Chronic atrial fibrillation with rapid ventricular response 06/23/2017    Colon polyp     Coronary artery disease     Diabetes mellitus     Dry eyes     Esophageal erosions     General anesthetics causing adverse effect in therapeutic use     GERD (gastroesophageal reflux disease)     Heart murmur     Hemorrhoid     High cholesterol     Hypertension     Irritable bowel  syndrome     Paroxysmal atrial fibrillation 10/30/2020    Persistent atrial fibrillation 02/10/2020    Shingles 2015    Stenosis of aortic and mitral valves     Stroke     TIA (transient ischemic attack)     Use of cane as ambulatory aid        Past Surgical History:   Procedure Laterality Date    ABDOMINAL SURGERY      stents to SMA    angiocele      2007, 2014    AORTIC VALVE REPLACEMENT N/A 11/19/2019    Procedure: Replacement-valve-aortic;  Surgeon: Jack Capone MD;  Location: [facility] CATH LAB;  Service: Cardiology;  Laterality: N/A;    BREAST SURGERY      left--- a lump--- no cancer    CARDIAC VALVE SURGERY      COLONOSCOPY  2014    COLONOSCOPY N/A 3/14/2018    Procedure: COLONOSCOPY;  Surgeon: Efren Kemp MD;  Location: [facility] ENDO ([facility] FLR);  Service: Endoscopy;  Laterality: N/A;  ok to hold Plavix 5 days prior to procedure per Dr RESHMA Hernandez     ok per Dr Kemp to hold Eliquis 2 days prior to procedure (see telephone encounter dated-2/7/18)    CORONARY ANGIOGRAPHY N/A 2/15/2024    Procedure: ANGIOGRAM, CORONARY ARTERY;  Surgeon: Jos Baptiste MD;  Location: [facility] CATH LAB;  Service: Cardiology;  Laterality: N/A;    HERNIA REPAIR      HYSTERECTOMY  partial    1982 partial hysterectomy    IMPLANTATION OF BIVENTRICULAR PERMANENT PACEMAKER AS UPGRADE TO EXISTING PACEMAKER Left 5/29/2024    Procedure: Biventricular pacemaker upgrade;  Surgeon: Chencho Moeller MD;  Location: [facility] EP LAB;  Service: Cardiology;  Laterality: Left;  CHF, AF,  CRTP ugrd, BIO, MAC, SC, 3prep ** BIO dcPPM in situ/ Contrast Prep**    LEFT HEART CATHETERIZATION Right 11/5/2019    Procedure: Left heart cath;  Surgeon: Jack Capone MD;  Location: [facility] CATH LAB;  Service: Cardiology;  Laterality: Right;    left nasal polyp      left neck lymph node      nose polyp      PHLEBOGRAPHY Left 5/1/2024    Procedure: Venogram;  Surgeon: Chenhco Moeller MD;  Location: [facility] EP LAB;  Service: Cardiology;  Laterality: Left;  HF, Venogram ( Lt arm) ,  WI, 3 prep ** BIO dcPPM in situ/ Contrast Prep**    right hip fatty mass tissue      stent to small intestine      SUPERIOR VENA CAVA ANGIOPLASTY / STENTING      TONSILLECTOMY      TOTAL ABDOMINAL HYSTERECTOMY      2014    TOTAL KNEE ARTHROPLASTY Bilateral     TREATMENT OF CARDIAC ARRHYTHMIA N/A 2/10/2020    Procedure: CARDIOVERSION;  Surgeon: Jayy Parrish MD;  Location: [facility] EP LAB;  Service: Cardiology;  Laterality: N/A;  AF, PEDRO, DCCV, MAC, DM, 3 Prep    TREATMENT OF CARDIAC ARRHYTHMIA N/A 5/15/2020    Procedure: CARDIOVERSION;  Surgeon: Hany Bee MD;  Location: [facility] EP LAB;  Service: Cardiology;  Laterality: N/A;  AF, PEDRO, DCCV, MAC, DM, 3 Prep    UPPER GASTROINTESTINAL ENDOSCOPY  2014       Review of patient's allergies indicates:   Allergen Reactions    Celebrex [celecoxib] Shortness Of Breath    Ciprofloxacin Swelling     lip    Dicyclomine Hives    Adhesive Dermatitis    Avelox [moxifloxacin] Swelling    Cilostazol Other (See Comments)     Elevates blood pressure    Eryc [erythromycin] Other (See Comments)     unknown    Iodine and iodide containing products Hives    Keflex [cephalexin] Hives    Meclomen      rashes    Meloxicam      Ear ringing    Metoclopramide Other (See Comments)     High blood pressure    Sulfa (sulfonamide antibiotics) Itching     Current Facility-Administered Medications   Medication Frequency    acetaminophen tablet 650 mg Q4H PRN    amoxicillin-clavulanate 500-125mg per tablet 500 mg Q12H    apixaban tablet 2.5 mg BID    atorvastatin tablet 10 mg Daily    carvediloL tablet 6.25 mg BID WM    clopidogreL tablet 75 mg Daily    dextrose 10% bolus 125 mL 125 mL PRN    dextrose 10% bolus 250 mL 250 mL PRN    DULoxetine DR capsule 20 mg Daily    gabapentin capsule 300 mg QHS    glucagon (human recombinant) injection 1 mg PRN    glucose chewable tablet 16 g PRN    glucose chewable tablet 24 g PRN    LORazepam tablet 0.5 mg Q12H PRN    melatonin tablet 6 mg Nightly PRN     montelukast tablet 10 mg Daily    naloxone 0.4 mg/mL injection 0.02 mg PRN    ondansetron injection 4 mg Q8H PRN    pantoprazole EC tablet 40 mg Daily    prochlorperazine injection Soln 5 mg Q6H PRN    ranolazine 12 hr tablet 500 mg BID    sodium chloride 0.9% flush 10 mL PRN     Facility-Administered Medications Ordered in Other Encounters   Medication Frequency    0.9%  NaCl infusion Continuous    sodium chloride 0.9% flush 5 mL PRN    vancomycin (VANCOCIN) 1,000 mg in dextrose 5 % (D5W) 250 mL IVPB (Vial-Mate) On Call Procedure     Family History       Problem Relation (Age of Onset)    Heart attack Mother    Heart failure Brother    Stroke Father          Tobacco Use    Smoking status: Never    Smokeless tobacco: Never   Substance and Sexual Activity    Alcohol use: No    Drug use: Never    Sexual activity: Not Currently     Partners: Male     Review of Systems   HENT:  Negative for facial swelling and sinus pressure.    Respiratory:  Negative for cough, choking, chest tightness and shortness of breath.    Cardiovascular:  Negative for chest pain and leg swelling.   Gastrointestinal:  Positive for abdominal distention. Negative for abdominal pain and anal bleeding.     Objective:     Vital Signs (Most Recent):  Temp: 97.9 °F (36.6 °C) (09/30/24 1513)  Pulse: (!) 59 (09/30/24 1513)  Resp: 18 (09/30/24 1513)  BP: 136/63 (09/30/24 1513)  SpO2: 95 % (09/30/24 1513) Vital Signs (24h Range):  Temp:  [97.5 °F (36.4 °C)-99.2 °F (37.3 °C)] 97.9 °F (36.6 °C)  Pulse:  [58-67] 59  Resp:  [16-20] 18  SpO2:  [94 %-99 %] 95 %  BP: (119-151)/(57-69) 136/63     Weight: 95.2 kg (209 lb 14.1 oz) (09/30/24 0535)  Body mass index is 36.03 kg/m².  Body surface area is 2.07 meters squared.    I/O last 3 completed shifts:  In: 1792 [P.O.:1542; IV Piggyback:250]  Out: 710 [Urine:710]     Physical Exam  Constitutional:       General: She is not in acute distress.     Appearance: She is ill-appearing. She is not toxic-appearing.   HENT:  "     Head: Normocephalic and atraumatic.   Eyes:      Extraocular Movements: Extraocular movements intact.      Pupils: Pupils are equal, round, and reactive to light.   Cardiovascular:      Rate and Rhythm: Normal rate.   Pulmonary:      Effort: No respiratory distress.      Breath sounds: No wheezing or rales.   Abdominal:      General: There is distension.      Tenderness: There is no abdominal tenderness. There is no guarding.   Musculoskeletal:      Right lower leg: No edema.      Left lower leg: No edema.   Skin:     Coloration: Skin is pale. Skin is not jaundiced.   Neurological:      Mental Status: She is alert.          Significant Labs:  CBC:   Recent Labs   Lab 09/30/24  0248 09/30/24  0941   WBC 12.52  --    RBC 3.28*  --    HGB 9.5*  --    HCT 31.2* <15*     --    MCV 95  --    MCH 29.0  --    MCHC 30.4*  --      CMP:   Recent Labs   Lab 09/30/24  0248 09/30/24  1356   * 150*   CALCIUM 9.3 9.7   ALBUMIN 3.2*  --    PROT 6.8  --    * 133*   K 4.4 4.6   CO2 32* 34*   CL 93* 90*   BUN 50* 52*   CREATININE 1.7* 1.6*   ALKPHOS 35*  --    ALT 11  --    AST 14  --    BILITOT 0.6  --      LFTs:   Recent Labs   Lab 09/30/24  0248   ALT 11   AST 14   ALKPHOS 35*   BILITOT 0.6   PROT 6.8   ALBUMIN 3.2*     PTH: No results for input(s): "PTH" in the last 168 hours.  TSH:   Recent Labs   Lab 09/29/24  1539   TSH 1.710     Recent Labs   Lab 09/30/24  1555   COLORU Yellow   SPECGRAV 1.020   PHUR 6.0   PROTEINUA 1+*   BACTERIA Rare   NITRITE Negative   LEUKOCYTESUR 1+*   HYALINECASTS 0     All labs within the past 24 hours have been reviewed.    Significant Imaging:   Labs reviewed    Assessment/Plan:     Renal/  TACOS (acute kidney injury)  Acute kidney injury    - Cr 1.9 on presentation, baseline <1, was normal 1 day prior.  - Patient was recently hospitalized,  She had experienced hypotension at home and noted to have low-normal BP in ED  - Reduced oral intake, patient also received diuretics, " could possibly secondary to or diuresis  - Encouraged oral intake, stop antihypertensives, monitor urine output,  - strict input/output, obtain daily CMP, replace electrolytes as indicated  - Renally dose medications and continue to trend Cr while admitted  - BP and kidney functions Improving, continue to monitor kidney functions  - We will reassess on daily basis and follow daily labs        Thank you for your consult. I will follow-up with patient. Please contact us if you have any additional questions.    Zunilda Vasquez MD  Nephrology  [facility] - Cardiology Step[facility]    ATTENDING PHYSICIAN ATTESTATION  I have personally verified the history and examined the patient. I thoroughly reviewed the demographic, clinical, laboratorial and imaging information available in medical records. I agree with the assessment and recommendations provided by the subspecialty resident who was under my supervision.

## 2024-10-01 NOTE — PROGRESS NOTES
Ronald Heredia - Cardiology [facility] Medicine  Progress Note    Patient Name: Adriana Strong  MRN: [id number]  Patient Class: OP- Observation   Admission Date: 9/29/2024  Length of Stay: 0 days  Attending Physician: Ruddy Boyd MD  Primary Care Provider: Krzysztof Mcgraw MD        Subjective:     Principal Problem:Chronic heart failure with preserved ejection fraction        HPI:  80 yo F with PMHx HF with recovered EF, hx of TAVR, Afib on elqiuis, HLD, HTN, and COPD on home 2L O2 who presents to the ED complaining of weakness/generalized malaise since her discharge yesterday. Pt. Was just admitted to this facility 9/24-9/28 for for decompensated heart failure. She was diuresed for 5 days with IV lasix, and she reports that she was feeeling well at discharge yesterday. Today, however, she woke up with lightheadedness and agtigue. She felt very weak when trying to just get out of bed and move around her house. She noted her BP was lower than usual (reported to be 80's/50's, so she came back to the ED for further evaluation. She denies any fevers, chills, SOB, chest pain, or palpitations.    Overview/Hospital Course:  No notes on file    Interval History:  Kidney functions improved and a.m. labs, received Lasix 80 mg at midnight and subsequently around 9:00 a.m. this morning.  Patient reports shortness a breath slightly improved and had a bowel movement.  ABG done, see specifics in plan.  Chest x-ray as well was done which showed florid pulmonary edema.  Nephrology and Cardiology consulted.  Gram-negative mitra growing on initial urine culture    Review of Systems   Unable to perform ROS: Other     Objective:     Vital Signs (Most Recent):  Temp: 98.4 °F (36.9 °C) (10/01/24 0737)  Pulse: 62 (10/01/24 1056)  Resp: 18 (10/01/24 0737)  BP: (!) 166/70 (10/01/24 0737)  SpO2: 99 % (10/01/24 0737) Vital Signs (24h Range):  Temp:  [97.6 °F (36.4 °C)-98.8 °F (37.1 °C)] 98.4 °F (36.9 °C)  Pulse:  [59-62] 62  Resp:  [18-20]  18  SpO2:  [91 %-99 %] 99 %  BP: (119-166)/(57-70) 166/70     Weight: 95.2 kg (209 lb 14.1 oz)  Body mass index is 36.03 kg/m².    Intake/Output Summary (Last 24 hours) at 10/1/2024 1140  Last data filed at 10/1/2024 1001  Gross per 24 hour   Intake 672 ml   Output 1625 ml   Net -953 ml         Physical Exam  Constitutional:       General: She is not in mild respiratory distress.     Appearance: She is obese.    HENT:      Head: Normocephalic.      Right Ear: External ear normal.      Left Ear: External ear normal.      Nose: Nose normal.      Comments: Nasal cannula  Eyes:      General: No scleral icterus.  Cardiovascular:      Rate and Rhythm: Normal rate.   Pulmonary:      Breath sounds: Significant Wheezing and rales present.  Wheezing has improved  Abdominal:      Palpations: Abdomen is soft.      Tenderness: There is no abdominal tenderness.   Musculoskeletal:      Right lower leg: Edema present.      Left lower leg: Edema present.   Neurological:      General: No focal deficit present.      Mental Status: She is alert. Mental status is at baseline.             Significant Labs: All pertinent labs within the past 24 hours have been reviewed.    Significant Imaging: I have reviewed all pertinent imaging results/findings within the past 24 hours.    Assessment/Plan:      * Chronic heart failure with preserved ejection fraction  -TTE showed normal EF, Grade III DD, TAVR, CVP was elevated at the time  -CXR today shows persistent L sided effusion, mild pulm edema, similar to prior. Pt. Stable on home O2 without respiratory distress  -Gentle hydration given in ED in setting of hypotension/TACOS. Continue to monitor volume status closely and restart lasix when appropriate    9/30  Received 1 L of IV fluids and subsequently had significant clinical pulmonary edema, given Lasix 80 mg IV once and we will reassess how she response.  VBG as she may have some compensated alkalosis given her respiratory  distress  10/1  Patient has decompensated diastolic heart failure with florid pulmonary edema noted on chest x-ray today, she does have respiratory distress on examination today.  ABG done, interpretation of possible mixed base disorder and main  of contraction alkalosis.  Given the complexities re volume status management nephrology and Cardiology were consulted.  Thus far nephrology recommends continue with 80 mg Lasix b.i.d. we will do b.i.d. BMP.  Input output monitoring and fluid restrictions for now.  Patient was previously treated for UTI, prescribed oral Augmentin by provider based on sensitivities.  Continue with Augmentin renally dosed and follow up with urine culture    Chronic respiratory failure with hypoxia  -Pt. Stable on home 2 L NC. CXR notes mild pulm edema and L sided pleural effusion, consider CT chest for further work-up if respiratory status worsening.    Acute on chronic combined systolic and diastolic congestive heart failure  - on presentation      TACOS (acute kidney injury)  -Cr 1.9 on presentation, baseline <1, was normal 1 day prior. Reports hypotension at home and noted to have low-normal BP in ED  -Urine studies pending, plan to hold home BP meds and lasix  for tonight and gently hydrate  -Renally dose medications and continue to trend Cr while admitted  9/30  BP and kidney functions Improving, continue to monitor kidney functions    Permanent atrial fibrillation  -Pt. In paced rhythm on admit, continue to monitor on telemetry. Continue home eliquis for AC    Essential hypertension  -Pt. BP low-normal with TACOS on presentation. Hold home BP meds for now and restart GDMT meds as tolerated.    Type 2 diabetes mellitus  -A1c 4.9 and has been persistently in normal range.  Hold metformin, consider stopping at discharge given new renal dysfunction with normal A1c      VTE Risk Mitigation (From admission, onward)           Ordered     apixaban tablet 5 mg  2 times daily          10/01/24 0946                    Discharge Planning   MIMI: 10/4/2024     Code Status: Full Code   Is the patient medically ready for discharge?:     Reason for patient still in hospital (select all that apply): Patient trending condition  Discharge Plan A: Home Health, Home with family                  Ruddy Boyd MD  Department of Hospital Medicine   [facility] - Cardiology Stepdown

## 2024-10-02 LAB
ALBUMIN SERPL BCP-MCNC: 2.9 G/DL (ref 3.5–5.2)
ALP SERPL-CCNC: 41 U/L (ref 55–135)
ALT SERPL W/O P-5'-P-CCNC: 43 U/L (ref 10–44)
ANION GAP SERPL CALC-SCNC: 14 MMOL/L (ref 8–16)
AST SERPL-CCNC: 45 U/L (ref 10–40)
BASOPHILS # BLD AUTO: 0.04 K/UL (ref 0–0.2)
BASOPHILS NFR BLD: 0.4 % (ref 0–1.9)
BILIRUB SERPL-MCNC: 0.7 MG/DL (ref 0.1–1)
BUN SERPL-MCNC: 36 MG/DL (ref 8–23)
CALCIUM SERPL-MCNC: 9.8 MG/DL (ref 8.7–10.5)
CHLORIDE SERPL-SCNC: 92 MMOL/L (ref 95–110)
CO2 SERPL-SCNC: 30 MMOL/L (ref 23–29)
CREAT SERPL-MCNC: 0.8 MG/DL (ref 0.5–1.4)
DIFFERENTIAL METHOD BLD: ABNORMAL
EOSINOPHIL # BLD AUTO: 0 K/UL (ref 0–0.5)
EOSINOPHIL NFR BLD: 0 % (ref 0–8)
ERYTHROCYTE [DISTWIDTH] IN BLOOD BY AUTOMATED COUNT: 16 % (ref 11.5–14.5)
EST. GFR  (NO RACE VARIABLE): >60 ML/MIN/1.73 M^2
GLUCOSE SERPL-MCNC: 125 MG/DL (ref 70–110)
HCT VFR BLD AUTO: 30.3 % (ref 37–48.5)
HGB BLD-MCNC: 9.4 G/DL (ref 12–16)
IMM GRANULOCYTES # BLD AUTO: 0.05 K/UL (ref 0–0.04)
IMM GRANULOCYTES NFR BLD AUTO: 0.6 % (ref 0–0.5)
LYMPHOCYTES # BLD AUTO: 1 K/UL (ref 1–4.8)
LYMPHOCYTES NFR BLD: 11 % (ref 18–48)
MCH RBC QN AUTO: 29.3 PG (ref 27–31)
MCHC RBC AUTO-ENTMCNC: 31 G/DL (ref 32–36)
MCV RBC AUTO: 94 FL (ref 82–98)
MONOCYTES # BLD AUTO: 1.3 K/UL (ref 0.3–1)
MONOCYTES NFR BLD: 14.8 % (ref 4–15)
NEUTROPHILS # BLD AUTO: 6.5 K/UL (ref 1.8–7.7)
NEUTROPHILS NFR BLD: 73.2 % (ref 38–73)
NRBC BLD-RTO: 0 /100 WBC
PLATELET # BLD AUTO: 237 K/UL (ref 150–450)
PMV BLD AUTO: 10.4 FL (ref 9.2–12.9)
POTASSIUM SERPL-SCNC: 3.8 MMOL/L (ref 3.5–5.1)
PROT SERPL-MCNC: 6.8 G/DL (ref 6–8.4)
RBC # BLD AUTO: 3.21 M/UL (ref 4–5.4)
SODIUM SERPL-SCNC: 136 MMOL/L (ref 136–145)
WBC # BLD AUTO: 8.9 K/UL (ref 3.9–12.7)

## 2024-10-02 PROCEDURE — 99900035 HC TECH TIME PER 15 MIN (STAT)

## 2024-10-02 PROCEDURE — 94761 N-INVAS EAR/PLS OXIMETRY MLT: CPT

## 2024-10-02 PROCEDURE — 20600001 HC STEP DOWN PRIVATE ROOM

## 2024-10-02 PROCEDURE — 25000003 PHARM REV CODE 250: Performed by: HOSPITALIST

## 2024-10-02 PROCEDURE — 63600175 PHARM REV CODE 636 W HCPCS: Performed by: INTERNAL MEDICINE

## 2024-10-02 PROCEDURE — 94640 AIRWAY INHALATION TREATMENT: CPT

## 2024-10-02 PROCEDURE — 99231 SBSQ HOSP IP/OBS SF/LOW 25: CPT | Mod: ,,, | Performed by: INTERNAL MEDICINE

## 2024-10-02 PROCEDURE — 27000221 HC OXYGEN, UP TO 24 HOURS

## 2024-10-02 PROCEDURE — 80053 COMPREHEN METABOLIC PANEL: CPT | Performed by: HOSPITALIST

## 2024-10-02 PROCEDURE — 85025 COMPLETE CBC W/AUTO DIFF WBC: CPT | Performed by: HOSPITALIST

## 2024-10-02 PROCEDURE — 25000242 PHARM REV CODE 250 ALT 637 W/ HCPCS: Performed by: NURSE PRACTITIONER

## 2024-10-02 PROCEDURE — 36415 COLL VENOUS BLD VENIPUNCTURE: CPT | Performed by: HOSPITALIST

## 2024-10-02 PROCEDURE — 25000003 PHARM REV CODE 250: Performed by: INTERNAL MEDICINE

## 2024-10-02 RX ORDER — IPRATROPIUM BROMIDE AND ALBUTEROL SULFATE 2.5; .5 MG/3ML; MG/3ML
3 SOLUTION RESPIRATORY (INHALATION)
Status: DISCONTINUED | OUTPATIENT
Start: 2024-10-03 | End: 2024-10-05 | Stop reason: HOSPADM

## 2024-10-02 RX ADMIN — APIXABAN 5 MG: 5 TABLET, FILM COATED ORAL at 09:10

## 2024-10-02 RX ADMIN — ACETAMINOPHEN 650 MG: 325 TABLET ORAL at 10:10

## 2024-10-02 RX ADMIN — PANTOPRAZOLE SODIUM 40 MG: 40 TABLET, DELAYED RELEASE ORAL at 08:10

## 2024-10-02 RX ADMIN — IPRATROPIUM BROMIDE AND ALBUTEROL SULFATE 3 ML: 2.5; .5 SOLUTION RESPIRATORY (INHALATION) at 05:10

## 2024-10-02 RX ADMIN — IPRATROPIUM BROMIDE AND ALBUTEROL SULFATE 3 ML: 2.5; .5 SOLUTION RESPIRATORY (INHALATION) at 12:10

## 2024-10-02 RX ADMIN — MEROPENEM 500 MG: 500 INJECTION INTRAVENOUS at 05:10

## 2024-10-02 RX ADMIN — RANOLAZINE 500 MG: 500 TABLET, EXTENDED RELEASE ORAL at 08:10

## 2024-10-02 RX ADMIN — FUROSEMIDE 80 MG: 10 INJECTION, SOLUTION INTRAVENOUS at 08:10

## 2024-10-02 RX ADMIN — FUROSEMIDE 80 MG: 10 INJECTION, SOLUTION INTRAVENOUS at 09:10

## 2024-10-02 RX ADMIN — ATORVASTATIN CALCIUM 10 MG: 10 TABLET, FILM COATED ORAL at 08:10

## 2024-10-02 RX ADMIN — RANOLAZINE 500 MG: 500 TABLET, EXTENDED RELEASE ORAL at 09:10

## 2024-10-02 RX ADMIN — AMOXICILLIN AND CLAVULANATE POTASSIUM 1 TABLET: 875; 125 TABLET, FILM COATED ORAL at 08:10

## 2024-10-02 RX ADMIN — APIXABAN 5 MG: 5 TABLET, FILM COATED ORAL at 08:10

## 2024-10-02 RX ADMIN — DULOXETINE HYDROCHLORIDE 20 MG: 20 CAPSULE, DELAYED RELEASE ORAL at 08:10

## 2024-10-02 RX ADMIN — GABAPENTIN 300 MG: 300 CAPSULE ORAL at 09:10

## 2024-10-02 RX ADMIN — CARVEDILOL 6.25 MG: 6.25 TABLET, FILM COATED ORAL at 08:10

## 2024-10-02 RX ADMIN — IPRATROPIUM BROMIDE AND ALBUTEROL SULFATE 3 ML: 2.5; .5 SOLUTION RESPIRATORY (INHALATION) at 10:10

## 2024-10-02 RX ADMIN — MONTELUKAST 10 MG: 10 TABLET, FILM COATED ORAL at 08:10

## 2024-10-02 RX ADMIN — CLOPIDOGREL BISULFATE 75 MG: 75 TABLET ORAL at 08:10

## 2024-10-02 RX ADMIN — CARVEDILOL 6.25 MG: 6.25 TABLET, FILM COATED ORAL at 05:10

## 2024-10-02 NOTE — CARE UPDATE
"RAPID RESPONSE NURSE CHART REVIEW        Chart Reviewed: 10/02/2024, 1:02 AM    MRN: [id number]  Bed: 302/302 A    Dx: Chronic heart failure with preserved ejection fraction    Adriana Strong has a past medical history of Acute on chronic diastolic (congestive) heart failure, Anticoagulant long-term use, Anxiety, Arthritis, Asthma, Atrial fibrillation, Atrial fibrillation with RVR, Cataracts, bilateral, Chest pain, atypical, Chronic atrial fibrillation with rapid ventricular response, Colon polyp, Coronary artery disease, Diabetes mellitus, Dry eyes, Esophageal erosions, General anesthetics causing adverse effect in therapeutic use, GERD (gastroesophageal reflux disease), Heart murmur, Hemorrhoid, High cholesterol, Hypertension, Irritable bowel syndrome, Paroxysmal atrial fibrillation, Persistent atrial fibrillation, Shingles, Stenosis of aortic and mitral valves, Stroke, TIA (transient ischemic attack), and Use of cane as ambulatory aid.    Last VS: BP (!) 167/83   Pulse 60   Temp 98.5 °F (36.9 °C) (Oral)   Resp 14   Ht 5' 4" (1.626 m)   Wt 95.2 kg (209 lb 14.1 oz)   LMP 01/21/1973 (LMP Unknown)   SpO2 97%   Breastfeeding No   BMI 36.03 kg/m²     24H Vital Sign Range:  Temp:  [97.7 °F (36.5 °C)-98.6 °F (37 °C)]   Pulse:  [59-62]   Resp:  [14-20]   BP: (117-172)/(58-84)   SpO2:  [93 %-99 %]     Level of Consciousness (AVPU): alert    Recent Labs     09/29/24  1539 09/30/24  0248 09/30/24  0941 10/01/24  0200   WBC 12.31 12.52  --  12.23   HGB 8.7* 9.5*  --  9.4*   HCT 27.4* 31.2* <15* 29.0*    256  --  251       Recent Labs     09/29/24  1539 09/30/24  0248 09/30/24  1356 10/01/24  0200 10/01/24  1512   *   < > 133* 133* 131*   K 4.5   < > 4.6 4.0 4.4   CL 93*   < > 90* 92* 91*   CO2 26   < > 34* 31* 28   BUN 49*   < > 52* 49* 44*   CREATININE 1.9*   < > 1.6* 1.1 0.9   *   < > 150* 97 179*   PHOS 4.4  --   --   --   --    MG 1.9  --  2.2  --   --     < > = values in this interval not " displayed.        Recent Labs     09/30/24  0941 10/01/24  1016   PH 7.299* 7.443   PCO2 25.7* 54.4*   PO2 41 83   HCO3 12.6* 37.2*   POCSATURATED 72 96   BE -14* 13*        OXYGEN:  Flow (L/min) (Oxygen Therapy): 2          MEWS score: 0    Rounding completed with charge SILVINO klein communicating w/ primary team about patient's SOB. Plan to add PRN breathing treatments to MAR. No additional concerns verbalized at this time. Instructed to call [facility] for further concerns or assistance.    Jocelyn Oliver RN

## 2024-10-02 NOTE — SUBJECTIVE & OBJECTIVE
Interval History:  Sodium within normal limits.  Kidney functions continues to improve.  Urine culture sensitivities and isolate still pending.  No new complaints at this time, had bowel movement yesterday.  Seen comfortably this morning sitting in chair, daughter at bedside updated.  Requesting PT OT evaluation once more stable    Review of Systems   Unable to perform ROS: Other     Objective:     Vital Signs (Most Recent):  Temp: 98.4 °F (36.9 °C) (10/02/24 0801)  Pulse: 60 (10/02/24 0801)  Resp: 20 (10/02/24 0801)  BP: (!) 143/81 (10/02/24 0801)  SpO2: 100 % (10/02/24 0801) Vital Signs (24h Range):  Temp:  [97.7 °F (36.5 °C)-98.6 °F (37 °C)] 98.4 °F (36.9 °C)  Pulse:  [59-62] 60  Resp:  [14-20] 20  SpO2:  [93 %-100 %] 100 %  BP: (117-172)/(58-84) 143/81     Weight: 95.2 kg (209 lb 14.1 oz)  Body mass index is 36.03 kg/m².    Intake/Output Summary (Last 24 hours) at 10/2/2024 0999  Last data filed at 10/2/2024 0534  Gross per 24 hour   Intake 240 ml   Output 1925 ml   Net -1685 ml            Physical Exam  Constitutional:       General: She is not in mild respiratory distress.     Appearance: She is obese.    HENT:      Head: Normocephalic.      Right Ear: External ear normal.      Left Ear: External ear normal.      Nose: Nose normal.      Comments: Nasal cannula  Eyes:      General: No scleral icterus.  Cardiovascular:      Rate and Rhythm: Normal rate.   Pulmonary:      Breath sounds: Significant Wheezing and rales present.  Wheezing has improved  Abdominal:      Palpations: Abdomen is soft.      Tenderness: There is no abdominal tenderness.   Musculoskeletal:      Right lower leg: Edema present.      Left lower leg: Edema present.   Edema improved  Neurological:      General: No focal deficit present.      Mental Status: She is alert. Mental status is at baseline.             Significant Labs: All pertinent labs within the past 24 hours have been reviewed.

## 2024-10-02 NOTE — PROGRESS NOTES
Ronald Heredia - Cardiology [facility] Medicine  Progress Note    Patient Name: Adriana Strong  MRN: [id number]  Patient Class: IP- Inpatient   Admission Date: 9/29/2024  Length of Stay: 1 days  Attending Physician: Ruddy Boyd MD  Primary Care Provider: Krzysztof Mcgraw MD        Subjective:     Principal Problem:Chronic heart failure with preserved ejection fraction        HPI:  80 yo F with PMHx HF with recovered EF, hx of TAVR, Afib on elqiuis, HLD, HTN, and COPD on home 2L O2 who presents to the ED complaining of weakness/generalized malaise since her discharge yesterday. Pt. Was just admitted to this facility 9/24-9/28 for for decompensated heart failure. She was diuresed for 5 days with IV lasix, and she reports that she was feeeling well at discharge yesterday. Today, however, she woke up with lightheadedness and agtigue. She felt very weak when trying to just get out of bed and move around her house. She noted her BP was lower than usual (reported to be 80's/50's, so she came back to the ED for further evaluation. She denies any fevers, chills, SOB, chest pain, or palpitations.    Overview/Hospital Course:  Patient was readmitted for hypotension with TACOS, she received IV fluids and subsequently had significant pulmonary edema.  She was diuresed and Nephrology was consulted.  Volume status improving    Interval History:  Sodium within normal limits.  Kidney functions continues to improve.  Urine culture sensitivities and isolate still pending.  No new complaints at this time, had bowel movement yesterday.  Seen comfortably this morning sitting in chair, daughter at bedside updated.  Requesting PT OT evaluation once more stable    Review of Systems   Unable to perform ROS: Other     Objective:     Vital Signs (Most Recent):  Temp: 98.4 °F (36.9 °C) (10/02/24 0801)  Pulse: 60 (10/02/24 0801)  Resp: 20 (10/02/24 0801)  BP: (!) 143/81 (10/02/24 0801)  SpO2: 100 % (10/02/24 0801) Vital Signs (24h Range):  Temp:   [97.7 °F (36.5 °C)-98.6 °F (37 °C)] 98.4 °F (36.9 °C)  Pulse:  [59-62] 60  Resp:  [14-20] 20  SpO2:  [93 %-100 %] 100 %  BP: (117-172)/(58-84) 143/81     Weight: 95.2 kg (209 lb 14.1 oz)  Body mass index is 36.03 kg/m².    Intake/Output Summary (Last 24 hours) at 10/2/2024 0946  Last data filed at 10/2/2024 0534  Gross per 24 hour   Intake 240 ml   Output 1925 ml   Net -1685 ml            Physical Exam  Constitutional:       General: She is not in mild respiratory distress.     Appearance: She is obese.    HENT:      Head: Normocephalic.      Right Ear: External ear normal.      Left Ear: External ear normal.      Nose: Nose normal.      Comments: Nasal cannula  Eyes:      General: No scleral icterus.  Cardiovascular:      Rate and Rhythm: Normal rate.   Pulmonary:      Breath sounds: Significant Wheezing and rales present.  Wheezing has improved  Abdominal:      Palpations: Abdomen is soft.      Tenderness: There is no abdominal tenderness.   Musculoskeletal:      Right lower leg: Edema present.      Left lower leg: Edema present.   Edema improved  Neurological:      General: No focal deficit present.      Mental Status: She is alert. Mental status is at baseline.             Significant Labs: All pertinent labs within the past 24 hours have been reviewed.      Assessment/Plan:      * Chronic heart failure with preserved ejection fraction  -TTE showed normal EF, Grade III DD, TAVR, CVP was elevated at the time  -CXR today shows persistent L sided effusion, mild pulm edema, similar to prior. Pt. Stable on home O2 without respiratory distress  -Gentle hydration given in ED in setting of hypotension/TACOS. Continue to monitor volume status closely and restart lasix when appropriate    9/30  Received 1 L of IV fluids and subsequently had significant clinical pulmonary edema, given Lasix 80 mg IV once and we will reassess how she response.  VBG as she may have some compensated alkalosis given her respiratory  distress  10/1  Patient has decompensated diastolic heart failure with florid pulmonary edema noted on chest x-ray today, she does have respiratory distress on examination today.  ABG done, interpretation of possible mixed base disorder and main  of contraction alkalosis.  Given the complexities re volume status management nephrology and Cardiology were consulted.  Thus far nephrology recommends continue with 80 mg Lasix b.i.d. we will do b.i.d. BMP.  Input output monitoring and fluid restrictions for now.  Patient was previously treated for UTI, prescribed oral Augmentin by provider based on sensitivities.  Continue with Augmentin renally dosed and follow up with urine culture  10/2  Continue with Lasix 80 mg IV b.i.d., input output monitoring fluid restrictions.  Continue Augmentin and follow up with isolate and sensitivities.  Nephrology on board    Chronic respiratory failure with hypoxia  -Pt. Stable on home 2 L NC. CXR notes mild pulm edema and L sided pleural effusion, consider CT chest for further work-up if respiratory status worsening.    Acute on chronic combined systolic and diastolic congestive heart failure  - on presentation      TACOS (acute kidney injury)  -Cr 1.9 on presentation, baseline <1, was normal 1 day prior. Reports hypotension at home and noted to have low-normal BP in ED  -Urine studies pending, plan to hold home BP meds and lasix  for tonight and gently hydrate  -Renally dose medications and continue to trend Cr while admitted  9/30  BP and kidney functions Improving, continue to monitor kidney functions    Permanent atrial fibrillation  -Pt. In paced rhythm on admit, continue to monitor on telemetry. Continue home eliquis for AC    Essential hypertension  -Pt. BP low-normal with TACOS on presentation. Hold home BP meds for now and restart GDMT meds as tolerated.    Type 2 diabetes mellitus  -A1c 4.9 and has been persistently in normal range.  Hold metformin, consider stopping  at discharge given new renal dysfunction with normal A1c      VTE Risk Mitigation (From admission, onward)           Ordered     apixaban tablet 5 mg  2 times daily         10/01/24 0946                    Discharge Planning   MIMI: 10/4/2024     Code Status: Full Code   Is the patient medically ready for discharge?:     Reason for patient still in hospital (select all that apply): Patient trending condition  Discharge Plan A: Home Health, Home with family                  Ruddy Boyd MD  Department of Hospital Medicine   [facility] - Cardiology Stepdown

## 2024-10-02 NOTE — NURSING
"2200 - Upon entering room to give night medicine. Patient's family concerned for increased SOB and work of breathing. Patient currently abdominal breathing. Patient stated "I feel winded and can't catch my breath."  Patient also stating she feels anxious as well. Patient currently on 2 L NC. Sats 96%. Violet Alford NP notified. With orders to give patient a extra dose of lasix and to use PRN breathing treatment. Charge nurse notified of situation and called RRT to come visualize patient. Respiratory called and came to bedside to administer breathing treatment. Violet Bustillo NP also gave okay to give extra dose of Ativan.     2300- Post breathing treatment patient stated she feels better and less SOB. Patient appears to in no acute distress. Plan of Care continues.   "

## 2024-10-02 NOTE — ASSESSMENT & PLAN NOTE
-TTE showed normal EF, Grade III DD, TAVR, CVP was elevated at the time  -CXR today shows persistent L sided effusion, mild pulm edema, similar to prior. Pt. Stable on home O2 without respiratory distress  -Gentle hydration given in ED in setting of hypotension/TACOS. Continue to monitor volume status closely and restart lasix when appropriate    9/30  Received 1 L of IV fluids and subsequently had significant clinical pulmonary edema, given Lasix 80 mg IV once and we will reassess how she response.  VBG as she may have some compensated alkalosis given her respiratory distress  10/1  Patient has decompensated diastolic heart failure with florid pulmonary edema noted on chest x-ray today, she does have respiratory distress on examination today.  ABG done, interpretation of possible mixed base disorder and main  of contraction alkalosis.  Given the complexities re volume status management nephrology and Cardiology were consulted.  Thus far nephrology recommends continue with 80 mg Lasix b.i.d. we will do b.i.d. BMP.  Input output monitoring and fluid restrictions for now.  Patient was previously treated for UTI, prescribed oral Augmentin by provider based on sensitivities.  Continue with Augmentin renally dosed and follow up with urine culture  10/2  Continue with Lasix 80 mg IV b.i.d., input output monitoring fluid restrictions.  Continue Augmentin and follow up with isolate and sensitivities.  Nephrology on board

## 2024-10-02 NOTE — ASSESSMENT & PLAN NOTE
Acute kidney injury  Kidney function  2/2 hypotension improved with fluids and recovered BP.     Plan:    --Continue to Diurese as needed for pulmonary edema and respiratory address    --Scr 0.8 today - TACOS resolved  - Patient was recently hospitalized,  She had experienced hypotension at home and noted to have low-normal BP in ED  - Reduced oral intake, patient also received diuretics, could possibly secondary to or diuresis  - Encouraged oral intake, stop antihypertensives, monitor urine output,  - strict input/output, obtain daily CMP, replace electrolytes as indicated  - Renally dose medications and continue to trend Cr while admitted  - BP and kidney functions Improving, continue to monitor kidney functions  - We will reassess on daily basis and follow daily labs

## 2024-10-02 NOTE — PROGRESS NOTES
[facility]  Nephrology  Progress Note    Patient Name: Adriana Strong  MRN: [id number]  Admission Date: 9/29/2024  Hospital Length of Stay: 1 days  Attending Provider: Ruddy Boyd MD   Primary Care Physician: Krzysztof Mcgraw MD  Principal Problem:Chronic heart failure with preserved ejection fraction    Subjective:     HPI: 78 yo F with PMHx HF with recovered EF, hx of TAVR, Afib on elqiuis, HLD, HTN, and COPD on home 2L O2 who presents to the ED complaining of weakness/generalized malaise since her discharge yesterday. Pt. Was just admitted to this facility 9/24-9/28 for for decompensated heart failure. She was diuresed for 5 days with IV lasix, and she reports that she was feeeling well at discharge yesterday. Today, however, she woke up with lightheadedness and agtigue. She felt very weak when trying to just get out of bed and move around her house. She noted her BP was lower than usual (reported to be 80's/50's, so she came back to the ED for further evaluation. She denies any fevers, chills, SOB, chest pain, or palpitations. Nephrology were consulted to manage TACOS      Interval History: No acute complaints at this time. Creatinine has downtrended to 0.8. Continues on 80 of Lasix BID.     Review of patient's allergies indicates:   Allergen Reactions    Celebrex [celecoxib] Shortness Of Breath    Ciprofloxacin Swelling     lip    Dicyclomine Hives    Adhesive Dermatitis    Avelox [moxifloxacin] Swelling    Cilostazol Other (See Comments)     Elevates blood pressure    Eryc [erythromycin] Other (See Comments)     unknown    Iodine and iodide containing products Hives    Keflex [cephalexin] Hives    Meclomen      rashes    Meloxicam      Ear ringing    Metoclopramide Other (See Comments)     High blood pressure    Sulfa (sulfonamide antibiotics) Itching     Current Facility-Administered Medications   Medication Frequency    acetaminophen tablet 650 mg Q4H PRN    albuterol-ipratropium 2.5 mg-0.5  mg/3 mL nebulizer solution 3 mL Q4H PRN    amoxicillin-clavulanate 875-125mg per tablet 1 tablet Q12H    apixaban tablet 5 mg BID    atorvastatin tablet 10 mg Daily    carvediloL tablet 6.25 mg BID WM    clopidogreL tablet 75 mg Daily    dextrose 10% bolus 125 mL 125 mL PRN    dextrose 10% bolus 250 mL 250 mL PRN    DULoxetine DR capsule 20 mg Daily    furosemide injection 80 mg Q12H    gabapentin capsule 300 mg QHS    glucagon (human recombinant) injection 1 mg PRN    glucose chewable tablet 16 g PRN    glucose chewable tablet 24 g PRN    LORazepam tablet 0.5 mg Q12H PRN    melatonin tablet 6 mg Nightly PRN    montelukast tablet 10 mg Daily    naloxone 0.4 mg/mL injection 0.02 mg PRN    ondansetron injection 4 mg Q8H PRN    pantoprazole EC tablet 40 mg Daily    prochlorperazine injection Soln 5 mg Q6H PRN    ranolazine 12 hr tablet 500 mg BID    sodium chloride 0.9% flush 10 mL PRN     Facility-Administered Medications Ordered in Other Encounters   Medication Frequency    0.9%  NaCl infusion Continuous    sodium chloride 0.9% flush 5 mL PRN    vancomycin (VANCOCIN) 1,000 mg in dextrose 5 % (D5W) 250 mL IVPB (Vial-Mate) On Call Procedure       Objective:     Vital Signs (Most Recent):  Temp: 98.4 °F (36.9 °C) (10/02/24 0801)  Pulse: 60 (10/02/24 0801)  Resp: 20 (10/02/24 0801)  BP: (!) 143/81 (10/02/24 0801)  SpO2: 100 % (10/02/24 0801) Vital Signs (24h Range):  Temp:  [97.7 °F (36.5 °C)-98.6 °F (37 °C)] 98.4 °F (36.9 °C)  Pulse:  [59-62] 60  Resp:  [14-20] 20  SpO2:  [93 %-100 %] 100 %  BP: (117-172)/(58-84) 143/81     Weight: 95.2 kg (209 lb 14.1 oz) (09/30/24 0535)  Body mass index is 36.03 kg/m².  Body surface area is 2.07 meters squared.    I/O last 3 completed shifts:  In: 340 [P.O.:340]  Out: 2750 [Urine:2750]     Physical Exam  Vitals reviewed.   Constitutional:       Appearance: Normal appearance. She is obese.   HENT:      Right Ear: External ear normal.      Left Ear: External ear normal.      Nose: Nose  normal.   Eyes:      Pupils: Pupils are equal, round, and reactive to light.   Cardiovascular:      Rate and Rhythm: Normal rate and regular rhythm.   Pulmonary:      Effort: Pulmonary effort is normal.   Abdominal:      General: Abdomen is flat.      Palpations: Abdomen is soft.   Musculoskeletal:         General: No swelling.      Right lower leg: No edema.      Left lower leg: No edema.   Skin:     General: Skin is warm.      Coloration: Skin is not jaundiced.   Neurological:      General: No focal deficit present.      Mental Status: She is alert and oriented to person, place, and time. Mental status is at baseline.   Psychiatric:         Mood and Affect: Mood normal.         Behavior: Behavior normal.         Thought Content: Thought content normal.         Judgment: Judgment normal.          Significant Labs:  All labs within the past 24 hours have been reviewed.     Significant Imaging:  Labs: Reviewed    Assessment/Plan:     Renal/  TACOS (acute kidney injury)  Acute kidney injury  Kidney function  2/2 hypotension improved with fluids and recovered BP.     Plan:    --Continue to Diurese as needed for pulmonary edema and respiratory address    --Scr 0.8 today - TACOS resolved  - Patient was recently hospitalized,  She had experienced hypotension at home and noted to have low-normal BP in ED  - Reduced oral intake, patient also received diuretics, could possibly secondary to or diuresis  - Encouraged oral intake, stop antihypertensives, monitor urine output,  - strict input/output, obtain daily CMP, replace electrolytes as indicated  - Renally dose medications and continue to trend Cr while admitted  - BP and kidney functions Improving, continue to monitor kidney functions  - We will reassess on daily basis and follow daily labs        Thank you for your consult. I will sign off. Please contact us if you have any additional questions.    Ham Mckeon MD  Nephrology  [facility]  [facility]    ATTENDING PHYSICIAN ATTESTATION  I have personally verified the history and examined the patient. I thoroughly reviewed the demographic, clinical, laboratorial and imaging information available in medical records. I agree with the assessment and recommendations provided by the subspecialty resident who was under my supervision.

## 2024-10-02 NOTE — SUBJECTIVE & OBJECTIVE
Interval History: No acute complaints at this time. Creatinine has downtrended to 0.8. Continues on 80 of Lasix BID.     Review of patient's allergies indicates:   Allergen Reactions    Celebrex [celecoxib] Shortness Of Breath    Ciprofloxacin Swelling     lip    Dicyclomine Hives    Adhesive Dermatitis    Avelox [moxifloxacin] Swelling    Cilostazol Other (See Comments)     Elevates blood pressure    Eryc [erythromycin] Other (See Comments)     unknown    Iodine and iodide containing products Hives    Keflex [cephalexin] Hives    Meclomen      rashes    Meloxicam      Ear ringing    Metoclopramide Other (See Comments)     High blood pressure    Sulfa (sulfonamide antibiotics) Itching     Current Facility-Administered Medications   Medication Frequency    acetaminophen tablet 650 mg Q4H PRN    albuterol-ipratropium 2.5 mg-0.5 mg/3 mL nebulizer solution 3 mL Q4H PRN    amoxicillin-clavulanate 875-125mg per tablet 1 tablet Q12H    apixaban tablet 5 mg BID    atorvastatin tablet 10 mg Daily    carvediloL tablet 6.25 mg BID WM    clopidogreL tablet 75 mg Daily    dextrose 10% bolus 125 mL 125 mL PRN    dextrose 10% bolus 250 mL 250 mL PRN    DULoxetine DR capsule 20 mg Daily    furosemide injection 80 mg Q12H    gabapentin capsule 300 mg QHS    glucagon (human recombinant) injection 1 mg PRN    glucose chewable tablet 16 g PRN    glucose chewable tablet 24 g PRN    LORazepam tablet 0.5 mg Q12H PRN    melatonin tablet 6 mg Nightly PRN    montelukast tablet 10 mg Daily    naloxone 0.4 mg/mL injection 0.02 mg PRN    ondansetron injection 4 mg Q8H PRN    pantoprazole EC tablet 40 mg Daily    prochlorperazine injection Soln 5 mg Q6H PRN    ranolazine 12 hr tablet 500 mg BID    sodium chloride 0.9% flush 10 mL PRN     Facility-Administered Medications Ordered in Other Encounters   Medication Frequency    0.9%  NaCl infusion Continuous    sodium chloride 0.9% flush 5 mL PRN    vancomycin (VANCOCIN) 1,000 mg in dextrose 5 %  (D5W) 250 mL IVPB (Vial-Mate) On Call Procedure       Objective:     Vital Signs (Most Recent):  Temp: 98.4 °F (36.9 °C) (10/02/24 0801)  Pulse: 60 (10/02/24 0801)  Resp: 20 (10/02/24 0801)  BP: (!) 143/81 (10/02/24 0801)  SpO2: 100 % (10/02/24 0801) Vital Signs (24h Range):  Temp:  [97.7 °F (36.5 °C)-98.6 °F (37 °C)] 98.4 °F (36.9 °C)  Pulse:  [59-62] 60  Resp:  [14-20] 20  SpO2:  [93 %-100 %] 100 %  BP: (117-172)/(58-84) 143/81     Weight: 95.2 kg (209 lb 14.1 oz) (09/30/24 0535)  Body mass index is 36.03 kg/m².  Body surface area is 2.07 meters squared.    I/O last 3 completed shifts:  In: 340 [P.O.:340]  Out: 2750 [Urine:2750]     Physical Exam  Vitals reviewed.   Constitutional:       Appearance: Normal appearance. She is obese.   HENT:      Right Ear: External ear normal.      Left Ear: External ear normal.      Nose: Nose normal.   Eyes:      Pupils: Pupils are equal, round, and reactive to light.   Cardiovascular:      Rate and Rhythm: Normal rate and regular rhythm.   Pulmonary:      Effort: Pulmonary effort is normal.   Abdominal:      General: Abdomen is flat.      Palpations: Abdomen is soft.   Musculoskeletal:         General: No swelling.      Right lower leg: No edema.      Left lower leg: No edema.   Skin:     General: Skin is warm.      Coloration: Skin is not jaundiced.   Neurological:      General: No focal deficit present.      Mental Status: She is alert and oriented to person, place, and time. Mental status is at baseline.   Psychiatric:         Mood and Affect: Mood normal.         Behavior: Behavior normal.         Thought Content: Thought content normal.         Judgment: Judgment normal.          Significant Labs:  All labs within the past 24 hours have been reviewed.     Significant Imaging:  Labs: Reviewed

## 2024-10-02 NOTE — CARE UPDATE
Pseudomonas noted on urine cultures.  Given sensitivities and allergies list.  We will start IV meropenem

## 2024-10-02 NOTE — HOSPITAL COURSE
Patient was readmitted for hypotension with TACOS, she received IV fluids and subsequently had significant pulmonary edema.  She was diuresed and Nephrology was consulted.  Volume status improving    10/3 hypotension with TACOS, she received IV fluids and subsequently had significant pulmonary edema.  diuresed and Nephrology was consulted. TACOS resolved- likely from reduced oral intake and  diuresis. Volume status improving. negative balance of  2.2 L  K and Mg replaced. continue lasix 80mg IV q 12h. sats 100% on 2L NC . UC from 9/29 with pseudomonas.  IV meropenem started   10/4 improved oral intake. continue meropenem x 3 days. CX ray -Persistent increased size of the cardiac silhouette with a biventricular pacemaker. Similar mild prominence of interstitial markings within both lungs with low lung volumes. There is no blunting of costophrenic angles.   10/5 completed meropenem today. discussed with family. discharge with home health

## 2024-10-03 PROBLEM — D64.9 ANEMIA: Status: ACTIVE | Noted: 2024-10-03

## 2024-10-03 LAB
ALBUMIN SERPL BCP-MCNC: 2.9 G/DL (ref 3.5–5.2)
ANION GAP SERPL CALC-SCNC: 10 MMOL/L (ref 8–16)
ANION GAP SERPL CALC-SCNC: 9 MMOL/L (ref 8–16)
BACTERIA UR CULT: ABNORMAL
BASOPHILS # BLD AUTO: 0.05 K/UL (ref 0–0.2)
BASOPHILS NFR BLD: 0.7 % (ref 0–1.9)
BUN SERPL-MCNC: 26 MG/DL (ref 8–23)
BUN SERPL-MCNC: 30 MG/DL (ref 8–23)
CALCIUM SERPL-MCNC: 9 MG/DL (ref 8.7–10.5)
CALCIUM SERPL-MCNC: 9.4 MG/DL (ref 8.7–10.5)
CHLORIDE SERPL-SCNC: 89 MMOL/L (ref 95–110)
CHLORIDE SERPL-SCNC: 91 MMOL/L (ref 95–110)
CO2 SERPL-SCNC: 35 MMOL/L (ref 23–29)
CO2 SERPL-SCNC: 40 MMOL/L (ref 23–29)
CREAT SERPL-MCNC: 0.7 MG/DL (ref 0.5–1.4)
CREAT SERPL-MCNC: 0.7 MG/DL (ref 0.5–1.4)
DIFFERENTIAL METHOD BLD: ABNORMAL
EOSINOPHIL # BLD AUTO: 0 K/UL (ref 0–0.5)
EOSINOPHIL NFR BLD: 0.3 % (ref 0–8)
ERYTHROCYTE [DISTWIDTH] IN BLOOD BY AUTOMATED COUNT: 15.9 % (ref 11.5–14.5)
EST. GFR  (NO RACE VARIABLE): >60 ML/MIN/1.73 M^2
EST. GFR  (NO RACE VARIABLE): >60 ML/MIN/1.73 M^2
GLUCOSE SERPL-MCNC: 107 MG/DL (ref 70–110)
GLUCOSE SERPL-MCNC: 116 MG/DL (ref 70–110)
HCT VFR BLD AUTO: 28.5 % (ref 37–48.5)
HGB BLD-MCNC: 9.2 G/DL (ref 12–16)
IMM GRANULOCYTES # BLD AUTO: 0.03 K/UL (ref 0–0.04)
IMM GRANULOCYTES NFR BLD AUTO: 0.4 % (ref 0–0.5)
LYMPHOCYTES # BLD AUTO: 1.4 K/UL (ref 1–4.8)
LYMPHOCYTES NFR BLD: 19.7 % (ref 18–48)
MAGNESIUM SERPL-MCNC: 1.6 MG/DL (ref 1.6–2.6)
MCH RBC QN AUTO: 29.1 PG (ref 27–31)
MCHC RBC AUTO-ENTMCNC: 32.3 G/DL (ref 32–36)
MCV RBC AUTO: 90 FL (ref 82–98)
MONOCYTES # BLD AUTO: 1.1 K/UL (ref 0.3–1)
MONOCYTES NFR BLD: 15.2 % (ref 4–15)
NEUTROPHILS # BLD AUTO: 4.6 K/UL (ref 1.8–7.7)
NEUTROPHILS NFR BLD: 63.7 % (ref 38–73)
NRBC BLD-RTO: 0 /100 WBC
PHOSPHATE SERPL-MCNC: 2.1 MG/DL (ref 2.7–4.5)
PLATELET # BLD AUTO: 253 K/UL (ref 150–450)
PMV BLD AUTO: 10 FL (ref 9.2–12.9)
POTASSIUM SERPL-SCNC: 3.3 MMOL/L (ref 3.5–5.1)
POTASSIUM SERPL-SCNC: 3.8 MMOL/L (ref 3.5–5.1)
RBC # BLD AUTO: 3.16 M/UL (ref 4–5.4)
SODIUM SERPL-SCNC: 136 MMOL/L (ref 136–145)
SODIUM SERPL-SCNC: 138 MMOL/L (ref 136–145)
WBC # BLD AUTO: 7.25 K/UL (ref 3.9–12.7)

## 2024-10-03 PROCEDURE — 20600001 HC STEP DOWN PRIVATE ROOM

## 2024-10-03 PROCEDURE — 25000003 PHARM REV CODE 250: Performed by: HOSPITALIST

## 2024-10-03 PROCEDURE — 94640 AIRWAY INHALATION TREATMENT: CPT

## 2024-10-03 PROCEDURE — 36415 COLL VENOUS BLD VENIPUNCTURE: CPT | Performed by: HOSPITALIST

## 2024-10-03 PROCEDURE — 83735 ASSAY OF MAGNESIUM: CPT | Performed by: INTERNAL MEDICINE

## 2024-10-03 PROCEDURE — 97530 THERAPEUTIC ACTIVITIES: CPT | Mod: CQ

## 2024-10-03 PROCEDURE — 63600175 PHARM REV CODE 636 W HCPCS: Performed by: HOSPITALIST

## 2024-10-03 PROCEDURE — 80048 BASIC METABOLIC PNL TOTAL CA: CPT | Performed by: INTERNAL MEDICINE

## 2024-10-03 PROCEDURE — 25000242 PHARM REV CODE 250 ALT 637 W/ HCPCS: Performed by: NURSE PRACTITIONER

## 2024-10-03 PROCEDURE — 85025 COMPLETE CBC W/AUTO DIFF WBC: CPT | Performed by: INTERNAL MEDICINE

## 2024-10-03 PROCEDURE — 99900035 HC TECH TIME PER 15 MIN (STAT)

## 2024-10-03 PROCEDURE — 97116 GAIT TRAINING THERAPY: CPT | Mod: CQ

## 2024-10-03 PROCEDURE — 80069 RENAL FUNCTION PANEL: CPT | Performed by: HOSPITALIST

## 2024-10-03 PROCEDURE — 36415 COLL VENOUS BLD VENIPUNCTURE: CPT | Performed by: INTERNAL MEDICINE

## 2024-10-03 PROCEDURE — 27000221 HC OXYGEN, UP TO 24 HOURS

## 2024-10-03 PROCEDURE — 94761 N-INVAS EAR/PLS OXIMETRY MLT: CPT

## 2024-10-03 PROCEDURE — 97535 SELF CARE MNGMENT TRAINING: CPT

## 2024-10-03 PROCEDURE — 25000003 PHARM REV CODE 250: Performed by: INTERNAL MEDICINE

## 2024-10-03 PROCEDURE — 63600175 PHARM REV CODE 636 W HCPCS: Performed by: INTERNAL MEDICINE

## 2024-10-03 RX ORDER — SODIUM,POTASSIUM PHOSPHATES 280-250MG
2 POWDER IN PACKET (EA) ORAL
Status: COMPLETED | OUTPATIENT
Start: 2024-10-03 | End: 2024-10-04

## 2024-10-03 RX ORDER — POTASSIUM CHLORIDE 20 MEQ/1
40 TABLET, EXTENDED RELEASE ORAL ONCE
Status: COMPLETED | OUTPATIENT
Start: 2024-10-03 | End: 2024-10-03

## 2024-10-03 RX ORDER — MAGNESIUM SULFATE HEPTAHYDRATE 40 MG/ML
2 INJECTION, SOLUTION INTRAVENOUS ONCE
Status: COMPLETED | OUTPATIENT
Start: 2024-10-03 | End: 2024-10-03

## 2024-10-03 RX ADMIN — MAGNESIUM SULFATE HEPTAHYDRATE 2 G: 40 INJECTION, SOLUTION INTRAVENOUS at 09:10

## 2024-10-03 RX ADMIN — CARVEDILOL 6.25 MG: 6.25 TABLET, FILM COATED ORAL at 08:10

## 2024-10-03 RX ADMIN — CLOPIDOGREL BISULFATE 75 MG: 75 TABLET ORAL at 09:10

## 2024-10-03 RX ADMIN — IPRATROPIUM BROMIDE AND ALBUTEROL SULFATE 3 ML: 2.5; .5 SOLUTION RESPIRATORY (INHALATION) at 08:10

## 2024-10-03 RX ADMIN — DULOXETINE HYDROCHLORIDE 20 MG: 20 CAPSULE, DELAYED RELEASE ORAL at 09:10

## 2024-10-03 RX ADMIN — LORAZEPAM 0.5 MG: 0.5 TABLET ORAL at 10:10

## 2024-10-03 RX ADMIN — APIXABAN 5 MG: 5 TABLET, FILM COATED ORAL at 09:10

## 2024-10-03 RX ADMIN — FUROSEMIDE 80 MG: 10 INJECTION, SOLUTION INTRAVENOUS at 09:10

## 2024-10-03 RX ADMIN — ACETAMINOPHEN 650 MG: 325 TABLET ORAL at 09:10

## 2024-10-03 RX ADMIN — POTASSIUM CHLORIDE 40 MEQ: 1500 TABLET, EXTENDED RELEASE ORAL at 09:10

## 2024-10-03 RX ADMIN — MEROPENEM 500 MG: 500 INJECTION INTRAVENOUS at 08:10

## 2024-10-03 RX ADMIN — IPRATROPIUM BROMIDE AND ALBUTEROL SULFATE 3 ML: 2.5; .5 SOLUTION RESPIRATORY (INHALATION) at 04:10

## 2024-10-03 RX ADMIN — MONTELUKAST 10 MG: 10 TABLET, FILM COATED ORAL at 09:10

## 2024-10-03 RX ADMIN — CARVEDILOL 6.25 MG: 6.25 TABLET, FILM COATED ORAL at 05:10

## 2024-10-03 RX ADMIN — ACETAMINOPHEN 650 MG: 325 TABLET ORAL at 10:10

## 2024-10-03 RX ADMIN — RANOLAZINE 500 MG: 500 TABLET, EXTENDED RELEASE ORAL at 09:10

## 2024-10-03 RX ADMIN — MEROPENEM 1 G: 1 INJECTION INTRAVENOUS at 05:10

## 2024-10-03 RX ADMIN — POTASSIUM & SODIUM PHOSPHATES POWDER PACK 280-160-250 MG 2 PACKET: 280-160-250 PACK at 09:10

## 2024-10-03 RX ADMIN — POTASSIUM CHLORIDE 40 MEQ: 1500 TABLET, EXTENDED RELEASE ORAL at 08:10

## 2024-10-03 RX ADMIN — GABAPENTIN 300 MG: 300 CAPSULE ORAL at 09:10

## 2024-10-03 RX ADMIN — MEROPENEM 500 MG: 500 INJECTION INTRAVENOUS at 01:10

## 2024-10-03 RX ADMIN — ATORVASTATIN CALCIUM 10 MG: 10 TABLET, FILM COATED ORAL at 09:10

## 2024-10-03 RX ADMIN — IPRATROPIUM BROMIDE AND ALBUTEROL SULFATE 3 ML: 2.5; .5 SOLUTION RESPIRATORY (INHALATION) at 12:10

## 2024-10-03 RX ADMIN — PANTOPRAZOLE SODIUM 40 MG: 40 TABLET, DELAYED RELEASE ORAL at 09:10

## 2024-10-03 NOTE — PLAN OF CARE
[facility]      HOME HEALTH ORDERS  FACE TO FACE ENCOUNTER    Patient Name: Adriana Strong  YOB: 1945    PCP: Krzysztof Mcgraw MD   PCP Address: [address] / [place] [address]  PCP Phone Number: [phone]  PCP Fax: [phone]    Encounter Date: 9/29/24    Admit to Home Health    Diagnoses:  Active Hospital Problems    Diagnosis  POA    *Chronic heart failure with preserved ejection fraction [I50.32]  Yes    Anemia [D64.9]  Yes    Chronic respiratory failure with hypoxia [J96.11]  Yes    TACOS (acute kidney injury) [N17.9]  Yes    UTI (urinary tract infection) [N39.0]  Yes    Permanent atrial fibrillation [I48.21]  Yes    Essential hypertension [I10]  Yes    Type 2 diabetes mellitus [E11.9]  Yes     A1C 7.0        Resolved Hospital Problems   No resolved problems to display.       Follow Up Appointments:  Future Appointments   Date Time Provider Department Center   12/3/2024 10:00 AM Shikha Raymundo MD [facility]       Allergies:  Review of patient's allergies indicates:   Allergen Reactions    Celebrex [celecoxib] Shortness Of Breath    Ciprofloxacin Swelling     lip    Dicyclomine Hives    Adhesive Dermatitis    Avelox [moxifloxacin] Swelling    Cilostazol Other (See Comments)     Elevates blood pressure    Eryc [erythromycin] Other (See Comments)     unknown    Iodine and iodide containing products Hives    Keflex [cephalexin] Hives    Meclomen      rashes    Meloxicam      Ear ringing    Metoclopramide Other (See Comments)     High blood pressure    Sulfa (sulfonamide antibiotics) Itching       Medications: Review discharge medications with patient and family and provide education.    Current Facility-Administered Medications   Medication Dose Route Frequency Provider Last Rate Last Admin    acetaminophen tablet 650 mg  650 mg Oral Q4H PRN Elijah Hernandez MD   650 mg at 10/04/24 1928    albuterol-ipratropium 2.5 mg-0.5 mg/3 mL nebulizer solution 3 mL   3 mL Nebulization Q4H PRN Violet Alford, NP   3 mL at 10/03/24 0827    albuterol-ipratropium 2.5 mg-0.5 mg/3 mL nebulizer solution 3 mL  3 mL Nebulization Q4H WAKE Violet Alford, NP   3 mL at 10/05/24 0701    apixaban tablet 5 mg  5 mg Oral BID Ruddy Body MD   5 mg at 10/04/24 2039    atorvastatin tablet 10 mg  10 mg Oral Daily Elijah Hernandez MD   10 mg at 10/04/24 1043    carvediloL tablet 6.25 mg  6.25 mg Oral BID WM Elijah Hernandez MD   6.25 mg at 10/04/24 1735    clopidogreL tablet 75 mg  75 mg Oral Daily Elijah Hernandez MD   75 mg at 10/04/24 1042    dextrose 10% bolus 125 mL 125 mL  12.5 g Intravenous PRElijah Joshi MD        dextrose 10% bolus 250 mL 250 mL  25 g Intravenous PRN Elijah Hernandez MD        DULoxetine DR capsule 20 mg  20 mg Oral Daily Elijah Hernandez MD   20 mg at 10/04/24 1042    furosemide injection 80 mg  80 mg Intravenous Q12H Ruddy Boyd MD   80 mg at 10/04/24 2039    gabapentin capsule 300 mg  300 mg Oral QHS Elijah Hernandez MD   300 mg at 10/04/24 2039    glucagon (human recombinant) injection 1 mg  1 mg Intramuscular PRElijah Joshi MD        glucose chewable tablet 16 g  16 g Oral Elijah Ying MD        glucose chewable tablet 24 g  24 g Oral PRElijah Joshi MD        LORazepam tablet 0.5 mg  0.5 mg Oral Q12H PRElijah Joshi MD   0.5 mg at 10/04/24 8025    melatonin tablet 6 mg  6 mg Oral Nightly PRElijah Joshi MD        meropenem (MERREM) 1 g in 0.9% NaCl 100 mL IVPB (MB+)  1 g Intravenous Q8H Cody Napoles MD   Stopped at 10/05/24 0101    montelukast tablet 10 mg  10 mg Oral Daily Elijah Hernandez MD   10 mg at 10/04/24 1043    naloxone 0.4 mg/mL injection 0.02 mg  0.02 mg Intravenous PRN Elijah Hernandez MD        ondansetron injection 4 mg  4 mg Intravenous Q8H PRN Elijah Hernandez MD        pantoprazole EC tablet 40 mg  40 mg Oral Daily Elijah Hernandez MD   40 mg at 10/04/24 1042    prochlorperazine injection Soln 5 mg  5  mg Intravenous Q6H PRN Elijah Hernandez MD        ranolazine 12 hr tablet 500 mg  500 mg Oral BID Elijah Hernandez MD   500 mg at 10/04/24 2039    sodium chloride 0.9% flush 10 mL  10 mL Intravenous PRElijah Joshi MD         Facility-Administered Medications Ordered in Other Encounters   Medication Dose Route Frequency Provider Last Rate Last Admin    0.9%  NaCl infusion   Intravenous Continuous Lynette Hightower NP   New Bag at 05/29/24 1440    sodium chloride 0.9% flush 5 mL  5 mL Intravenous PRN Lynette Hightower NP        vancomycin (VANCOCIN) 1,000 mg in dextrose 5 % (D5W) 250 mL IVPB (Vial-Mate)  1,000 mg Intravenous On Call Procedure Lynette Hightower NP   1,000 mg at 05/29/24 1454        Medication List        START taking these medications      gabapentin 300 MG capsule  Commonly known as: NEURONTIN  Take 1 capsule (300 mg total) by mouth every evening.  Replaces: gabapentin 600 MG tablet            CONTINUE taking these medications      albuterol-ipratropium 2.5 mg-0.5 mg/3 mL nebulizer solution  Commonly known as: DUO-NEB  Take 3 mLs by nebulization every 4 (four) hours as needed for Wheezing or Shortness of Breath.     apixaban 5 mg Tab  Commonly known as: ELIQUIS  Take 1 tablet (5 mg total) by mouth 2 (two) times daily.     ascorbic acid (vitamin C) 500 MG tablet  Commonly known as: VITAMIN C  Take 1,000 mg by mouth once daily.     atorvastatin 10 MG tablet  Commonly known as: LIPITOR  Take 1 tablet (10 mg total) by mouth once daily.     CALTRATE 600 + D ORAL  Take 1 tablet by mouth once daily.     carvediloL 6.25 MG tablet  Commonly known as: COREG  Take 1 tablet (6.25 mg total) by mouth 2 (two) times daily with meals.     clopidogreL 75 mg tablet  Commonly known as: PLAVIX  Take 1 tablet (75 mg total) by mouth once daily.     DULoxetine 20 MG capsule  Commonly known as: CYMBALTA  Take 1 capsule (20 mg total) by mouth once daily.     estradioL 0.01 % (0.1 mg/gram) vaginal  cream  Commonly known as: ESTRACE  Place vaginally.     fexofenadine 60 MG tablet  Commonly known as: ALLEGRA  Take 60 mg by mouth once daily.     furosemide 40 MG tablet  Commonly known as: LASIX  Take 1 tablet (40 mg total) by mouth 2 (two) times a day.     isosorbide mononitrate 60 MG 24 hr tablet  Commonly known as: IMDUR  Take 1 tablet (60 mg total) by mouth once daily.     LORazepam 0.5 MG tablet  Commonly known as: ATIVAN  Take 1 tablet (0.5 mg total) by mouth every morning.     metFORMIN 500 MG tablet  Commonly known as: GLUCOPHAGE  Take 1 tablet (500 mg total) by mouth 2 (two) times daily.     montelukast 10 mg tablet  Commonly known as: SINGULAIR  Take 10 mg by mouth once daily.     pantoprazole 40 MG tablet  Commonly known as: PROTONIX  Take 1 tablet (40 mg total) by mouth once daily.     ranolazine 1,000 mg Tb12  Commonly known as: RANEXA  Take 1 tablet (1,000 mg total) by mouth 2 (two) times daily.            STOP taking these medications      acetaminophen 650 MG Tbsr  Commonly known as: TYLENOL     amoxicillin-clavulanate 875-125mg 875-125 mg per tablet  Commonly known as: AUGMENTIN     gabapentin 600 MG tablet  Commonly known as: NEURONTIN  Replaced by: gabapentin 300 MG capsule     HYDROcodone-acetaminophen 5-325 mg per tablet  Commonly known as: NORCO     INV losartan 50 MG Tab  Commonly known as: COZAAR                I have seen and examined this patient within the last 30 days. My clinical findings that support the need for the home health skilled services and home bound status are the following:no   Weakness/numbness causing balance and gait disturbance due to Weakness/Debility making it taxing to leave home.     Diet:   cardiac diet        Referrals/ Consults  Physical Therapy to evaluate and treat. Evaluate for home safety and equipment needs; Establish/upgrade home exercise program. Perform / instruct on therapeutic exercises, gait training, transfer training, and Range of  Motion.  Occupational Therapy to evaluate and treat. Evaluate home environment for safety and equipment needs. Perform/Instruct on transfers, ADL training, ROM, and therapeutic exercises.  Aide to provide assistance with personal care, ADLs, and vital signs.    Activities:   activity as tolerated    Nursing:   Agency to admit patient within 24 hours of hospital discharge unless specified on physician order or at patient request    SN to complete comprehensive assessment including routine vital signs. Instruct on disease process and s/s of complications to report to MD. Review/verify medication list sent home with the patient at time of discharge  and instruct patient/caregiver as needed. Frequency may be adjusted depending on start of care date.     Skilled nurse to perform up to 3 visits PRN for symptoms related to diagnosis    Notify MD if SBP > 160 or < 90; DBP > 90 or < 50; HR > 120 or < 50; Temp > 101; O2 < 88%;     Ok to schedule additional visits based on staff availability and patient request on consecutive days within the home health episode.    When multiple disciplines ordered:    Start of Care occurs on Sunday - Wednesday schedule remaining discipline evaluations as ordered on separate consecutive days following the start of care.    Thursday SOC -schedule subsequent evaluations Friday and Monday the following week.     Friday - Saturday SOC - schedule subsequent discipline evaluations on consecutive days starting Monday of the following week.    For all post-discharge communication and subsequent orders please contact patient's primary care physician. If unable to reach primary care physician or do not receive response within 30 minutes, please contact [phone] for clinical staff order clarification    Miscellaneous     oxygen 2L via nasal canula continuously    Routine Skin for Bedridden Patients: Instruct patient/caregiver to apply moisture barrier cream to all skin folds and wet areas in perineal  area daily and after baths and all bowel movements.    Home Health Aide:  Nursing every 48 hours, Physical Therapy every 48 hours, Occupational Therapy every 48 hours, and Home Health Aide every 48 hours    Wound Care Orders  no    I certify that this patient is confined to her home and needs intermittent skilled nursing care, physical therapy, and occupational therapy.

## 2024-10-03 NOTE — ASSESSMENT & PLAN NOTE
-Pt. Stable on home 2 L NC. CXR notes mild pulm edema and L sided pleural effusion, consider CT chest for further work-up if respiratory status worsening.    10/3 sats 100% on 2L NC .

## 2024-10-03 NOTE — PLAN OF CARE
Alert and oriented, tylenol given for headache, no s/s of distress, plan of care explained to patient and daughter, comfort measures provided, U/S guided peripheral iv inserted by RN, tolerated well, will continue to monitor.    Problem: Adult Inpatient Plan of Care  Goal: Plan of Care Review  Outcome: Progressing  Goal: Absence of Hospital-Acquired Illness or Injury  Outcome: Progressing  Goal: Optimal Comfort and Wellbeing  Outcome: Progressing  Goal: Readiness for Transition of Care  Outcome: Progressing     Problem: Diabetes Comorbidity  Goal: Blood Glucose Level Within Targeted Range  Outcome: Progressing     Problem: Acute Kidney Injury/Impairment  Goal: Fluid and Electrolyte Balance  Outcome: Progressing     Problem: Wound  Goal: Skin Health and Integrity  Outcome: Progressing     Problem: Fall Injury Risk  Goal: Absence of Fall and Fall-Related Injury  Outcome: Progressing

## 2024-10-03 NOTE — ASSESSMENT & PLAN NOTE
-TTE showed normal EF, Grade III DD, TAVR, CVP was elevated at the time  -CXR today shows persistent L sided effusion, mild pulm edema, similar to prior. Pt. Stable on home O2 without respiratory distress  -Gentle hydration given in ED in setting of hypotension/TACOS. Continue to monitor volume status closely and restart lasix when appropriate    9/30  Received 1 L of IV fluids and subsequently had significant clinical pulmonary edema, given Lasix 80 mg IV once and we will reassess how she response.  VBG as she may have some compensated alkalosis given her respiratory distress  10/1  Patient has decompensated diastolic heart failure with florid pulmonary edema noted on chest x-ray today, she does have respiratory distress on examination today.  ABG done, interpretation of possible mixed base disorder and main  of contraction alkalosis.  Given the complexities re volume status management nephrology and Cardiology were consulted.  Thus far nephrology recommends continue with 80 mg Lasix b.i.d. we will do b.i.d. BMP.  Input output monitoring and fluid restrictions for now.  Patient was previously treated for UTI, prescribed oral Augmentin by provider based on sensitivities.  Continue with Augmentin renally dosed and follow up with urine culture  10/2  Continue with Lasix 80 mg IV b.i.d., input output monitoring fluid restrictions.  Continue Augmentin and follow up with isolate and sensitivities.  Nephrology on board    10/3 hypotension with TACOS, she received IV fluids and subsequently had significant pulmonary edema.  diuresed and Nephrology was consulted. TACOS resolved- likely from reduced oral intake and  diuresis. Volume status improving. negative balance of  2.2 L

## 2024-10-03 NOTE — ASSESSMENT & PLAN NOTE
-Cr 1.9 on presentation, baseline <1, was normal 1 day prior. Reports hypotension at home and noted to have low-normal BP in ED  -Urine studies pending, plan to hold home BP meds and lasix  for tonight and gently hydrate  -Renally dose medications and continue to trend Cr while admitted  10/3   BP and kidney functions Improving, continue to monitor kidney functions  Recent Labs   Lab 10/01/24  1512 10/02/24  0332 10/03/24  0249   BUN 44* 36* 30*   CREATININE 0.9 0.8 0.7       I & O     Intake/Output Summary (Last 24 hours) at 10/3/2024 0644  Last data filed at 10/3/2024 0456  Gross per 24 hour   Intake 480 ml   Output 1450 ml   Net -970 ml

## 2024-10-03 NOTE — ASSESSMENT & PLAN NOTE
-Pt. BP low-normal with TACOS on presentation. Hold home BP meds for now and restart GDMT meds as tolerated.  10/3 BP improved to 140s

## 2024-10-03 NOTE — ASSESSMENT & PLAN NOTE
Patient's with Normocytic anemia.. Hemoglobin stable. Etiology likely due to chronic disease .  Current CBC reviewed-    Recent Labs   Lab 10/01/24  0200 10/02/24  0332 10/03/24  0249   HGB 9.4* 9.4* 9.2*         Component Value Date/Time    MCV 90 10/03/2024 0249    RDW 15.9 (H) 10/03/2024 0249    IRON 39 09/30/2022 1324    FERRITIN 81 09/30/2022 1324    FOLATE >24.0 08/24/2016 0723    [id number] 02/19/2019 1435     Monitor CBC and transfuse if H/H drops below 7/21.

## 2024-10-03 NOTE — NURSING
Plan of care reviewed with patient. Patient is AOX4 and VS stable. Patient remained free of falls and trauma, fall precautions are in place. Patient is ambulating with 2 assist. Patient has no complaints of pain. Patient has no questions at this time. Wheels are locked and the bed is in lowest position. The call bell is within reach. Telemetry is on.

## 2024-10-03 NOTE — PROGRESS NOTES
[facility] - Card[facility] Medicine  Progress Note    Patient Name: Adriana Strong  MRN: [id number]  Patient Class: IP- Inpatient   Admission Date: 9/29/2024  Length of Stay: 2 days  Attending Physician: Cody Napoles MD  Primary Care Provider: Krzysztof Mcgraw MD        Subjective:     Principal Problem:Chronic heart failure with preserved ejection fraction        HPI:  80 yo F with PMHx HF with recovered EF, hx of TAVR, Afib on elqiuis, HLD, HTN, and COPD on home 2L O2 who presents to the ED complaining of weakness/generalized malaise since her discharge yesterday. Pt. Was just admitted to this facility 9/24-9/28 for for decompensated heart failure. She was diuresed for 5 days with IV lasix, and she reports that she was feeeling well at discharge yesterday. Today, however, she woke up with lightheadedness and agtigue. She felt very weak when trying to just get out of bed and move around her house. She noted her BP was lower than usual (reported to be 80's/50's, so she came back to the ED for further evaluation. She denies any fevers, chills, SOB, chest pain, or palpitations.    Overview/Hospital Course:  Patient was readmitted for hypotension with TACOS, she received IV fluids and subsequently had significant pulmonary edema.  She was diuresed and Nephrology was consulted.  Volume status improving    10/3 hypotension with TACOS, she received IV fluids and subsequently had significant pulmonary edema.  diuresed and Nephrology was consulted. TACOS resolved- likely from reduced oral intake and  diuresis. Volume status improving. negative balance of  2.2 L  K and Mg replaced. continue lasix 80mg IV q 12h. sats 100% on 2L NC . UC from 9/29 with pseudomonas.  IV meropenem started         Review of Systems:   Pain scale:   Constitutional:  fever,  chills, headache, vision loss, hearing loss, weight loss, Generalized weakness, falls, loss of smell, loss of taste, poor appetite,  sore throat  Respiratory:  cough, shortness of breath.   Cardiovascular: chest pain, dizziness, palpitations, orthopnea, swelling of feet, syncope  Gastrointestinal: nausea, vomiting, abdominal pain, diarrhea, black stool,  blood in stool, change in bowel habits, constipation  Genitourinary: hematuria, dysuria, urgency, frequency  Integument/Breast: rash,  pruritis  Hematologic/Lymphatic: easy bruising, lymphadenopathy  Musculoskeletal: arthralgias , myalgias, back pain, neck pain, knee pain  Neurological: confusion, seizures, tremors, slurred speech  Behavioral/Psych:  depression, anxiety, auditory or visual hallucinations     OBJECTIVE:     Physical Exam:  Body mass index is 36.03 kg/m².    Constitutional: Appears well-developed and well-nourished. severe obesity   Head: Normocephalic and atraumatic.   Neck: Normal range of motion. Neck supple.   Cardiovascular: Normal heart rate.  Regular heart rhythm. + rales   Pulmonary/Chest: Effort normal.   Abdominal: + distension.  No tenderness  Musculoskeletal: Normal range of motion.+  edema.   Neurological: Alert and oriented to person, place, and time.   Skin: Skin is warm and dry.   Psychiatric: Normal mood and affect. Behavior is normal.                  Vital Signs  Temp: 98.2 °F (36.8 °C) (10/03/24 1142)  Pulse: 66 (10/03/24 1503)  Resp: 18 (10/03/24 1225)  BP: (!) 153/63 (10/03/24 1142)  SpO2: 96 % (10/03/24 1225)     24 Hour VS Range    Temp:  [97.4 °F (36.3 °C)-98.6 °F (37 °C)]   Pulse:  [59-90]   Resp:  [16-20]   BP: (146-170)/(63-80)   SpO2:  [93 %-100 %]     Intake/Output Summary (Last 24 hours) at 10/3/2024 1519  Last data filed at 10/3/2024 0939  Gross per 24 hour   Intake 430 ml   Output 850 ml   Net -420 ml         I/O This Shift:  I/O this shift:  In: 430 [P.O.:240; I.V.:190]  Out: -     Wt Readings from Last 3 Encounters:   09/30/24 95.2 kg (209 lb 14.1 oz)   09/28/24 90.6 kg (199 lb 11.8 oz)   09/24/24 95.7 kg (211 lb)       I have personally reviewed the vitals and recorded  "Intake/Output     Laboratory/Diagnostic Data:    CBC/Anemia Labs: Coags:    Recent Labs   Lab 10/01/24  0200 10/02/24  0332 10/03/24  0249   WBC 12.23 8.90 7.25   HGB 9.4* 9.4* 9.2*   HCT 29.0* 30.3* 28.5*    237 253   MCV 90 94 90   RDW 15.9* 16.0* 15.9*    No results for input(s): "PT", "INR", "APTT" in the last 168 hours.     Chemistries: ABG:   Recent Labs   Lab 09/29/24  1539 09/30/24  0248 09/30/24  1356 10/01/24  0200 10/01/24  1512 10/02/24  0332 10/03/24  0249   * 134* 133* 133* 131* 136 136   K 4.5 4.4 4.6 4.0 4.4 3.8 3.3*   CL 93* 93* 90* 92* 91* 92* 91*   CO2 26 32* 34* 31* 28 30* 35*   BUN 49* 50* 52* 49* 44* 36* 30*   CREATININE 1.9* 1.7* 1.6* 1.1 0.9 0.8 0.7   CALCIUM 8.8 9.3 9.7 9.7 9.6 9.8 9.0   PROT 6.3 6.8  --  7.0  --  6.8  --    BILITOT 0.7 0.6  --  0.6  --  0.7  --    ALKPHOS 33* 35*  --  54*  --  41*  --    ALT 6* 11  --  41  --  43  --    AST 17 14  --  68*  --  45*  --    MG 1.9  --  2.2  --   --   --  1.6   PHOS 4.4  --   --   --   --   --   --     Recent Labs   Lab 09/30/24  0941 10/01/24  1016   PH 7.299* 7.443   PCO2 25.7* 54.4*   PO2 41 83   HCO3 12.6* 37.2*   POCSATURATED 72 96   BE -14* 13*        POCT Glucose: HbA1c:    Recent Labs   Lab 09/28/24  0618 09/28/24  1100 09/30/24  0655 09/30/24  1148 09/30/24  1507 10/01/24  0637   POCTGLUCOSE 122* 154* 139* 188* 164* 138*    Hemoglobin A1C   Date Value Ref Range Status   09/25/2024 4.9 4.0 - 5.6 % Final     Comment:     ADA Screening Guidelines:  5.7-6.4%  Consistent with prediabetes  >or=6.5%  Consistent with diabetes    High levels of fetal hemoglobin interfere with the HbA1C  assay. Heterozygous hemoglobin variants (HbS, HgC, etc)do  not significantly interfere with this assay.   However, presence of multiple variants may affect accuracy.     02/09/2024 5.0 4.0 - 5.6 % Final     Comment:     ADA Screening Guidelines:  5.7-6.4%  Consistent with prediabetes  >or=6.5%  Consistent with diabetes    High levels of fetal " "hemoglobin interfere with the HbA1C  assay. Heterozygous hemoglobin variants (HbS, HgC, etc)do  not significantly interfere with this assay.   However, presence of multiple variants may affect accuracy.     09/21/2023 5.3 4.0 - 5.6 % Final     Comment:     ADA Screening Guidelines:  5.7-6.4%  Consistent with prediabetes  >or=6.5%  Consistent with diabetes    High levels of fetal hemoglobin interfere with the HbA1C  assay. Heterozygous hemoglobin variants (HbS, HgC, etc)do  not significantly interfere with this assay.   However, presence of multiple variants may affect accuracy.          Cardiac Enzymes: Ejection Fractions:    No results for input(s): "CPK", "CPKMB", "MB", "TROPONINI" in the last 72 hours. EF   Date Value Ref Range Status   02/10/2024 35 % Final   10/01/2022 55 % Final     Nuc Stress EF   Date Value Ref Range Status   02/14/2024 65 % Final   10/27/2023 67 % Final     Nuc Rest EF   Date Value Ref Range Status   02/14/2024 61  Final   10/27/2023 67  Final          No results for input(s): "COLORU", "APPEARANCEUA", "PHUR", "SPECGRAV", "PROTEINUA", "GLUCUA", "KETONESU", "BILIRUBINUA", "OCCULTUA", "NITRITE", "UROBILINOGEN", "LEUKOCYTESUR", "RBCUA", "WBCUA", "BACTERIA", "SQUAMEPITHEL", "HYALINECASTS" in the last 48 hours.    Invalid input(s): "WRIGHTSUR"    Procalcitonin (ng/mL)   Date Value   09/30/2022 0.06   10/19/2020 0.09   01/02/2017 <0.09     Lactate (Lactic Acid) (mmol/L)   Date Value   09/29/2024 1.1   09/30/2022 2.4 (H)   12/04/2021 2.4 (H)   11/26/2019 2.3 (H)   05/29/2017 1.2     BNP (pg/mL)   Date Value   09/29/2024 458 (H)   09/24/2024 425 (H)   03/14/2024 247 (H)   02/29/2024 171 (H)   02/21/2024 331 (H)     CRP (mg/L)   Date Value   04/21/2011 7.9   03/03/2009 7.4     Sed Rate   Date Value   01/21/2016 25 mm/Hr (H)   04/21/2011 29 mm/hr (H)   03/03/2009 25 mm/hr (H)     D-Dimer (mg/L FEU)   Date Value   06/22/2017 0.64 (H)   07/01/2016 0.33     Ferritin (ng/mL)   Date Value   09/30/2022 81 " "  07/01/2016 16 (L)   11/08/2015 20     No results found for: "LDH"  Troponin I (ng/mL)   Date Value   09/30/2024 0.024   09/29/2024 0.042 (H)   09/24/2024 <0.006   02/13/2024 0.011   02/09/2024 <0.006   09/22/2023 <0.006   09/21/2023 <0.006   09/21/2023 <0.006     CPK (U/L)   Date Value   08/11/2022 54   08/25/2020 55   07/01/2016 52   07/01/2016 52   05/07/2015 55   11/24/2014 54   05/07/2011 1010 (H)   05/07/2011 327 (H)     No results found for this or any previous visit.  SARS-CoV2 (COVID-19) Qualitative PCR (no units)   Date Value   05/14/2020 Not Detected     SARS-CoV-2 RNA, Amplification, Qual (no units)   Date Value   10/29/2020 Negative   10/19/2020 Negative     POC Rapid COVID (no units)   Date Value   09/30/2022 Negative   08/11/2022 Negative   08/17/2021 Negative   11/10/2020 Negative       Microbiology labs for the last week  Microbiology Results (last 7 days)       Procedure Component Value Units Date/Time    Urine culture [[id number]]  (Abnormal)  (Susceptibility) Collected: 09/29/24 2327    Order Status: Completed Specimen: Urine Updated: 10/03/24 1020     Urine Culture, Routine PSEUDOMONAS AERUGINOSA  >100,000 cfu/ml      Narrative:      Add on UUNR to #[id number] per Ruddy Boyd MD on  09/30/2024  03:28     Specimen Source->Urine    Urine culture [[id number]] Collected: 09/30/24 1555    Order Status: Completed Specimen: Urine Updated: 10/01/24 2053     Urine Culture, Routine Multiple organisms isolated. None in predominance.  Repeat if      clinically necessary.    Narrative:      Specimen Source->Urine            Reviewed and noted in plan where applicable- Please see chart for full lab data.    Lines/Drains:       Peripheral IV - Single Lumen 10/02/24 2342 20 G Anterior;Right Forearm (Active)   Number of days: 0       Female External Urinary Catheter w/ Suction 09/29/24 1734 (Active)   Skin no breakdown;no redness 10/03/24 0000   Tolerance no signs/symptoms of discomfort 10/03/24 0000   Suction " Continuous suction at 70 mmHg 10/02/24 2000   Date of last wick change 10/02/24 10/02/24 2000   Time of last wick change 1355 10/02/24 1355   Output (mL) 300 mL 10/01/24 1301   Number of days: 3       Imaging  ECG Results              EKG 12-lead (Final result)        Collection Time Result Time QRS Duration OHS QTC Calculation    09/29/24 15:29:53 09/30/24 14:18:41 94 250                     Final result by Interface, Lab In [facility] (09/30/24 14:18:43)                   Narrative:    Test Reason : R53.1,    Vent. Rate : 133 BPM     Atrial Rate : 058 BPM     P-R Int : 000 ms          QRS Dur : 094 ms      QT Int : 168 ms       P-R-T Axes : 000 092 097 degrees     QTc Int : 250 ms    Marked artifact  Atrial flutter with what is probably ventricular pacing  Rightward axis  Anteroseptal infarct ,age undetermined  Abnormal ECG  When compared with ECG of 24-SEP-2024 17:10,  Poor data quality in current ECG precludes serial comparison  Confirmed by Krzysztof Francisco MD ([id number]) on 9/30/2024 2:18:34 PM    Referred By: [email or web address]   SELF           Confirmed By:Krzysztof Francisco MD                                    Results for orders placed during the hospital encounter of 09/24/24    Echo    Interpretation Summary    Left Ventricle: The left ventricle is normal in size. Normal wall thickness. There is concentric remodeling. There is normal systolic function with a visually estimated ejection fraction of 60 - 65%. Grade III diastolic dysfunction. Elevated left ventricular filling pressure.    Right Ventricle: Normal right ventricular cavity size. Wall thickness is normal. Systolic function is normal. Pacemaker lead present in the ventricle.    The left atrium is severely dilated.    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 It is reported to be a 26mm Phan Suzi S3 valve. Aortic valve area by VTI is 1.90 cm2. Aortic valve peak velocity is 1.64 m/s. Mean gradient is 6 mmHg. The dimensionless  index is 0.57. There is mild aortic regurgitation.    Tricuspid Valve: There is mild regurgitation.    Pulmonary Artery: The estimated pulmonary artery systolic pressure is 48 mmHg.    IVC/SVC: Elevated venous pressure at 15 mmHg.    Pericardium: There is a small posterior effusion. No indication of cardiac tamponade.      X-Ray Chest 1 View  Narrative: EXAMINATION:  XR CHEST 1 VIEW    CLINICAL HISTORY:  sob;    TECHNIQUE:  Single frontal view of the chest was performed.    COMPARISON:  09/29/2024    FINDINGS:  Pacemaker remains on the left with electrodes to right atrium and ventricle.  Heart remains moderately enlarged.  Aortic valve stent graft is present.  Aortic atherosclerosis is noted.  Lung volumes are low.  Both lungs show mild ground-glass opacity in perihilar areas; bilateral pattern likely indicates edema.  Small right pleural effusion may be present.  Opacification is increasing in the left lower lung zone, likely pleural effusion with lung base atelectasis/consolidation.    Skeletal structures show no acute finding.  Impression: Cardiomegaly.  Status post TAVR.  Low lung volumes with likely pulmonary edema.  Increasing left pleural effusion.    Electronically signed by: Gagan Diaz MD  Date:    10/01/2024  Time:    09:32      Labs, Imaging, EKG and Diagnostic results from 10/3/2024 were reviewed.    Medications:  Medication list was reviewed and changes noted under Assessment/Plan.  Current Facility-Administered Medications on File Prior to Encounter   Medication Dose Route Frequency Provider Last Rate Last Admin    0.9%  NaCl infusion   Intravenous Continuous Lynette Hightower NP   New Bag at 05/29/24 1440    sodium chloride 0.9% flush 5 mL  5 mL Intravenous PRN Lynette Hightower NP        vancomycin (VANCOCIN) 1,000 mg in dextrose 5 % (D5W) 250 mL IVPB (Vial-Mate)  1,000 mg Intravenous On Call Procedure Lynette Hightower NP   1,000 mg at 05/29/24 1454     Current Outpatient  Medications on File Prior to Encounter   Medication Sig Dispense Refill    acetaminophen (TYLENOL) 650 MG TbSR Take 1,300 mg by mouth 2 (two) times daily as needed.      albuterol-ipratropium (DUO-NEB) 2.5 mg-0.5 mg/3 mL nebulizer solution Take 3 mLs by nebulization every 4 (four) hours as needed for Wheezing or Shortness of Breath.      apixaban (ELIQUIS) 5 mg Tab Take 1 tablet (5 mg total) by mouth 2 (two) times daily. 180 tablet 3    ascorbic acid, vitamin C, (VITAMIN C) 500 MG tablet Take 1,000 mg by mouth once daily.       atorvastatin (LIPITOR) 10 MG tablet Take 1 tablet (10 mg total) by mouth once daily. 30 tablet 0    calcium carbonate/vitamin D3 (CALTRATE 600 + D ORAL) Take 1 tablet by mouth once daily.      carvediloL (COREG) 6.25 MG tablet Take 1 tablet (6.25 mg total) by mouth 2 (two) times daily with meals. 180 tablet 3    clopidogreL (PLAVIX) 75 mg tablet Take 1 tablet (75 mg total) by mouth once daily. 90 tablet 3    DULoxetine (CYMBALTA) 20 MG capsule Take 1 capsule (20 mg total) by mouth once daily. 30 capsule 0    estradioL (ESTRACE) 0.01 % (0.1 mg/gram) vaginal cream Place vaginally.      fexofenadine (ALLEGRA) 60 MG tablet Take 60 mg by mouth once daily.      furosemide (LASIX) 40 MG tablet Take 1 tablet (40 mg total) by mouth 2 (two) times a day.      gabapentin (NEURONTIN) 600 MG tablet Take 600 mg by mouth 3 (three) times daily.      HYDROcodone-acetaminophen (NORCO) 5-325 mg per tablet Take 1 tablet by mouth every 4 (four) hours as needed for Pain. (Patient not taking: Reported on 9/24/2024) 12 tablet 0    isosorbide mononitrate (IMDUR) 60 MG 24 hr tablet Take 1 tablet (60 mg total) by mouth once daily. 30 tablet 0    LORazepam (ATIVAN) 0.5 MG tablet Take 1 tablet (0.5 mg total) by mouth every morning. 30 tablet 2    losartan (COZAAR) 50 MG Tab Take 1 tablet (50 mg total) by mouth once daily. 30 tablet 11    metFORMIN (GLUCOPHAGE) 500 MG tablet Take 1 tablet (500 mg total) by mouth 2 (two)  times daily. 60 tablet 0    montelukast (SINGULAIR) 10 mg tablet Take 10 mg by mouth once daily.      pantoprazole (PROTONIX) 40 MG tablet Take 1 tablet (40 mg total) by mouth once daily. 90 tablet 3    ranolazine (RANEXA) 1,000 mg Tb12 Take 1 tablet (1,000 mg total) by mouth 2 (two) times daily. 180 tablet 3     Scheduled Medications:  Current Facility-Administered Medications   Medication Dose Route Frequency    albuterol-ipratropium  3 mL Nebulization Q4H WAKE    apixaban  5 mg Oral BID    atorvastatin  10 mg Oral Daily    carvediloL  6.25 mg Oral BID WM    clopidogreL  75 mg Oral Daily    DULoxetine  20 mg Oral Daily    furosemide (LASIX) injection  80 mg Intravenous Q12H    gabapentin  300 mg Oral QHS    meropenem IV (PEDS and ADULTS)  1 g Intravenous Q8H    montelukast  10 mg Oral Daily    pantoprazole  40 mg Oral Daily    ranolazine  500 mg Oral BID     PRN:   Current Facility-Administered Medications:     acetaminophen, 650 mg, Oral, Q4H PRN    albuterol-ipratropium, 3 mL, Nebulization, Q4H PRN    dextrose 10%, 12.5 g, Intravenous, PRN    dextrose 10%, 25 g, Intravenous, PRN    glucagon (human recombinant), 1 mg, Intramuscular, PRN    glucose, 16 g, Oral, PRN    glucose, 24 g, Oral, PRN    LORazepam, 0.5 mg, Oral, Q12H PRN    melatonin, 6 mg, Oral, Nightly PRN    naloxone, 0.02 mg, Intravenous, PRN    ondansetron, 4 mg, Intravenous, Q8H PRN    prochlorperazine, 5 mg, Intravenous, Q6H PRN    sodium chloride 0.9%, 10 mL, Intravenous, PRN  Infusions:   Estimated Creatinine Clearance: 70.5 mL/min (based on SCr of 0.7 mg/dL).           Assessment/Plan:      * Chronic heart failure with preserved ejection fraction  -TTE showed normal EF, Grade III DD, TAVR, CVP was elevated at the time  -CXR today shows persistent L sided effusion, mild pulm edema, similar to prior. Pt. Stable on home O2 without respiratory distress  -Gentle hydration given in ED in setting of hypotension/TACOS. Continue to monitor volume status  closely and restart lasix when appropriate    9/30  Received 1 L of IV fluids and subsequently had significant clinical pulmonary edema, given Lasix 80 mg IV once and we will reassess how she response.  VBG as she may have some compensated alkalosis given her respiratory distress  10/1  Patient has decompensated diastolic heart failure with florid pulmonary edema noted on chest x-ray today, she does have respiratory distress on examination today.  ABG done, interpretation of possible mixed base disorder and main  of contraction alkalosis.  Given the complexities re volume status management nephrology and Cardiology were consulted.  Thus far nephrology recommends continue with 80 mg Lasix b.i.d. we will do b.i.d. BMP.  Input output monitoring and fluid restrictions for now.  Patient was previously treated for UTI, prescribed oral Augmentin by provider based on sensitivities.  Continue with Augmentin renally dosed and follow up with urine culture  10/2  Continue with Lasix 80 mg IV b.i.d., input output monitoring fluid restrictions.  Continue Augmentin and follow up with isolate and sensitivities.  Nephrology on board    10/3 hypotension with TACOS, she received IV fluids and subsequently had significant pulmonary edema.  diuresed and Nephrology was consulted. TACOS resolved- likely from reduced oral intake and  diuresis. Volume status improving. negative balance of  2.2 L     Anemia  Patient's with Normocytic anemia.. Hemoglobin stable. Etiology likely due to chronic disease .  Current CBC reviewed-    Recent Labs   Lab 10/01/24  0200 10/02/24  0332 10/03/24  0249   HGB 9.4* 9.4* 9.2*         Component Value Date/Time    MCV 90 10/03/2024 0249    RDW 15.9 (H) 10/03/2024 0249    IRON 39 09/30/2022 1324    FERRITIN 81 09/30/2022 1324    FOLATE >24.0 08/24/2016 0723    [id number] 02/19/2019 1435     Monitor CBC and transfuse if H/H drops below 7/21.      Chronic respiratory failure with hypoxia  -Pt. Stable on  home 2 L NC. CXR notes mild pulm edema and L sided pleural effusion, consider CT chest for further work-up if respiratory status worsening.    10/3 sats 100% on 2L NC .     Acute on chronic combined systolic and diastolic congestive heart failure  - on presentation      TACOS (acute kidney injury)  -Cr 1.9 on presentation, baseline <1, was normal 1 day prior. Reports hypotension at home and noted to have low-normal BP in ED  -Urine studies pending, plan to hold home BP meds and lasix  for tonight and gently hydrate  -Renally dose medications and continue to trend Cr while admitted  10/3   BP and kidney functions Improving, continue to monitor kidney functions  Recent Labs   Lab 10/01/24  1512 10/02/24  0332 10/03/24  0249   BUN 44* 36* 30*   CREATININE 0.9 0.8 0.7       I & O     Intake/Output Summary (Last 24 hours) at 10/3/2024 0644  Last data filed at 10/3/2024 0456  Gross per 24 hour   Intake 480 ml   Output 1450 ml   Net -970 ml        Permanent atrial fibrillation  -Pt. In paced rhythm on admit, continue to monitor on telemetry. Continue home eliquis for AC    Essential hypertension  -Pt. BP low-normal with TACOS on presentation. Hold home BP meds for now and restart GDMT meds as tolerated.  10/3 BP improved to 140s    Type 2 diabetes mellitus  -A1c 4.9 and has been persistently in normal range.  Hold metformin, consider stopping at discharge given new renal dysfunction with normal A1c      VTE Risk Mitigation (From admission, onward)           Ordered     apixaban tablet 5 mg  2 times daily         10/01/24 0946                    Discharge Planning   MIMI: 10/4/2024     Code Status: Full Code   Is the patient medically ready for discharge?:     Reason for patient still in hospital (select all that apply): Treatment  Discharge Plan A: Home Health, Home with family                  Cody Napoles MD  Department of Hospital Medicine   [facility] - Cardiology Stepdown

## 2024-10-03 NOTE — PT/OT/SLP PROGRESS
Physical Therapy Treatment    Patient Name:  Adriana Strong   MRN:  [id number]    Recommendations:     Discharge Recommendations: Moderate Intensity Therapy  Discharge Equipment Recommendations: walker, rolling  Barriers to discharge: Inaccessible home, Decreased caregiver support, and increased need for assistance    Assessment:     Adriana Strong is a 79 y.o. female admitted with a medical diagnosis of Chronic heart failure with preserved ejection fraction.  She presents with the following impairments/functional limitations: weakness, impaired endurance, impaired functional mobility, gait instability, impaired cardiopulmonary response to activity, decreased lower extremity function, decreased safety awareness.  Pt was agreeable and tolerated session fairly well. Pt is most limited by easily SOB with activities, but tolerated two short step transfer with RW. Cues required more better RW management and posture. Pt is progressing towards goals and continues to demonstrate the need for moderate intensity therapy on a daily basis post acute exhibited by decreased independence with functional mobility    Rehab Prognosis: Good; patient would benefit from acute skilled PT services to address these deficits and reach maximum level of function.    Recent Surgery: * No surgery found *      Plan:     During this hospitalization, patient to be seen 4 x/week to address the identified rehab impairments via gait training, therapeutic activities, therapeutic exercises, neuromuscular re-education and progress toward the following goals:    Plan of Care Expires:  10/30/24    Subjective     Chief Complaint: SOB with activity   Patient/Family Comments/goals: to reported wanted to sit on bedside commode first  Pain/Comfort:  Pain Rating 1: 0/10  Pain Rating Post-Intervention 1: 0/10      Objective:     Communicated with nurse prior to session.  Patient found HOB elevated with telemetry, PureWick, oxygen, peripheral IV upon PT entry to  room.     General Precautions: Standard, fall  Orthopedic Precautions: N/A  Braces: N/A  Respiratory Status: Nasal cannula, flow 2 L/min     Functional Mobility:  Additional staff present: OT  Co-treatment performed due to patient's multiple deficits requiring two skilled therapists to appropriately and safely assess patient's strength and endurance while facilitating functional tasks in addition to accommodating for patient's activity tolerance  Bed Mobility:   Scooting to EOB: contact guard assistance  Supine to Sit: minimum assistance; HOB elevated  Cues for sequencing   Transfers:   Sit <> Stand Transfer: contact guard assistance with rolling walker   From EOB and bedside commode   Cues to scoot forward and push up from the bed/commode  Bed >Commode> Chair Transfer:  CGA-Ang  with rolling walker using Step Transfer technique  SpO2 decrease to 88% after transfer to bedside chair, but increase to >90% within 1 minute of seated rest and pursed lip breathing.    Gait:  Pt ambulated ~4 steps + 2 ft with  CGA-Ang  and rolling walker.  Transfer to bedside commode, then chair.  Initially CGA, then requiring Ang when fatigued. Distance limited by fatigued.   All lines remained intact throughout ambulation trial, gait belt utilized.  Gait Deviation(s): flexed posture, decrease LE clearance, and decrease step length   Verbal/tactile cues for RW management and more upright posture.  Balance:   Static standing balance: CGA with RW  Completed pericare cleaning and activities with OT     AM-PAC 6 CLICK MOBILITY  Turning over in bed (including adjusting bedclothes, sheets and blankets)?: 3  Sitting down on and standing up from a chair with arms (e.g., wheelchair, bedside commode, etc.): 3  Moving from lying on back to sitting on the side of the bed?: 3  Moving to and from a bed to a chair (including a wheelchair)?: 3  Need to walk in hospital room?: 3  Climbing 3-5 steps with a railing?: 2  Basic Mobility Total Score:  17       Treatment & Education:  Patient educated on role of therapy, goals of session, and benefits of out of bed mobility.   Instructed on use of call button and importance of calling nursing staff for assistance with mobility   Questions/concerns addressed within PTA scope of practice  Pt verbalized understanding.  Whiteboard Updated      Patient left up in chair with all lines intact, call button in reach, and nurse notified..    GOALS:   Multidisciplinary Problems       Physical Therapy Goals          Problem: Physical Therapy    Goal Priority Disciplines Outcome Interventions   Physical Therapy Goal     PT, PT/OT Progressing    Description: Goals to be met by: 10/30/24     Patient will increase functional independence with mobility by performin. Supine to sit with Moderate Assistance  2. Sit to stand transfer with Moderate Assistance  3. Bed to chair transfer with Moderate Assistance using LRAD  4. Gait  x 50 feet with Moderate Assistance using LRAD.   5. Sitting at edge of bed x10 minutes with Stand-by Assistance  6. Stand for 5 minutes with Moderate Assistance using LRAD                         Time Tracking:     PT Received On: 10/03/24  PT Start Time: 0936     PT Stop Time: 1006  PT Total Time (min): 30 min     Billable Minutes: Gait Training 12 and Therapeutic Activity 12    Treatment Type: Treatment  PT/PTA: PTA     Number of PTA visits since last PT visit: 1     10/03/2024

## 2024-10-03 NOTE — PT/OT/SLP PROGRESS
Occupational Therapy   Co-Treatment  co-treatment performed this date due to need for 2 skilled therapists in order to ensure pt safety, accomodate for pt activity tolerance, and maximize functional potential    Name: Adriana Strong  MRN: [id number]  Admitting Diagnosis:  Chronic heart failure with preserved ejection fraction       Recommendations:     Discharge Recommendations: Moderate Intensity Therapy  Discharge Equipment Recommendations:  walker, rolling  Barriers to discharge:       Assessment:     Adriana Strong is a 79 y.o. female with a medical diagnosis of Chronic heart failure with preserved ejection fraction.  She presents with performance deficits affecting function are weakness, impaired balance, decreased safety awareness, impaired endurance, impaired self care skills, impaired functional mobility, gait instability, decreased lower extremity function, impaired cardiopulmonary response to activity. Pt tolerated tx well. Able to take a few steps to get into BSC this date. SpO2 monitored throughout, pt at 88% post ax, able to increase to 90s after pursed lip breathing. Pt will continue to benefit from skilled OT services to address impairments listed above to maximize independence with ADLs and functional mobility to ensure safe return to PLOF.     Rehab Prognosis:  Good; patient would benefit from acute skilled OT services to address these deficits and reach maximum level of function.       Plan:     Patient to be seen 4 x/week to address the above listed problems via self-care/home management, therapeutic activities, therapeutic exercises, neuromuscular re-education  Plan of Care Expires: 10/30/24  Plan of Care Reviewed with: patient, spouse    Subjective     Chief Complaint: feeling short winded  Patient/Family Comments/goals: get better  Pain/Comfort:  Pain Rating 1: 0/10  Pain Rating Post-Intervention 1: 0/10    Objective:     Communicated with: RN prior to session.  Patient found HOB elevated with  oxygen, telemetry, PureWick upon OT entry to room.    General Precautions: Standard, fall    Orthopedic Precautions:N/A  Braces: N/A  Respiratory Status: Nasal cannula, flow 3 L/min     Occupational Performance:     Bed Mobility:    Patient completed Scooting/Bridging with CGA  Patient completed Supine to Sit with minimum assistance     Functional Mobility/Transfers:  Patient completed Sit <> Stand Transfer with contact guard assistance  with  rolling walker and increased time  CGA from EOB and BSC  Patient completed Bed <> Chair Transfer using Step Transfer technique with minimum assistance with rolling walker  Functional Mobility: Pt able to take a few steps from bed>BSC and then BSC>chair with CGA-Min A and RW. Pt required increased time to complete on 3L.     Activities of Daily Living:  Upper Body Dressing: moderate assistance donning back gown  Lower Body Dressing: total assistance donning socks  Mod A for doffing/donning brief. Pt able to slide brief down legs in seated to doff, A needed for threading and pulling up past buttocks for donning new brief   Toileting: moderate assistance. A needed for briefs to pull up past hips in standing and for posterior alex care in standing, she was able to wipe ant laex area       AMPA 6 Click ADL: 16    Treatment & Education:  Pt educated on   Role of OT and OT POC  Safe transfer techniques and proper body mechanics for fall prevention and improved independence with functional transfers  Importance of OOB activities to increase endurance and tolerance for increased participation in daily ADLs    Pursed lip breathing technique to recover from SOB  Utilizing the call bell to request for assistance with all functional mobility to ensure safety during hospital stay.    Pt verbalized understanding and all questions were addressed within the scope of OT.     Patient left up in chair with all lines intact, call button in reach, and PT present    GOALS:   Multidisciplinary  Problems       Occupational Therapy Goals          Problem: Occupational Therapy    Goal Priority Disciplines Outcome Interventions   Occupational Therapy Goal     OT, PT/OT Progressing    Description: Goals to be met by: 10/30/2024     Patient will increase functional independence with ADLs by performing:    UE Dressing with Minimal Assistance.  LE Dressing with Minimal Assistance with use of adaptive equipment as needed.  Grooming while standing with Minimal Assistance.  Toileting from toilet/BSC with Minimal Assistance for hygiene and clothing management.   Toilet transfer to toilet/BSC with Moderate Assistance.  Supine to sit with Moderate Assistance.  Functional mobility to simulate household distances with Moderate Assistance and utilizing LRAD in order to maximize functional activity tolerance and standing balance required for engagement in occupations of choice.    DME Justifications:    Rolling Walker- Patient demonstrates a mobility limitation that significantly impairs their ability to participate in one or more mobility related activities of daily living. Patient's mobility limitation cannot be sufficiently resolved with the use of a cane, but can be sufficiently resolved with the use of a rolling walker.The use of a rolling walker will considerably improve their ability to participate in MRADLs. Patient will use the walker on a regular basis at home.                         Time Tracking:     OT Date of Treatment: 10/03/24  OT Start Time: 0936  OT Stop Time: 1002  OT Total Time (min): 26 min    Billable Minutes:Self Care/Home Management 26    OT/MELISSA: OT     Number of MELISSA visits since last OT visit: 0    10/3/2024

## 2024-10-04 LAB
ALBUMIN SERPL BCP-MCNC: 2.9 G/DL (ref 3.5–5.2)
ALBUMIN SERPL BCP-MCNC: 2.9 G/DL (ref 3.5–5.2)
ALP SERPL-CCNC: 51 U/L (ref 55–135)
ALT SERPL W/O P-5'-P-CCNC: 45 U/L (ref 10–44)
ANION GAP SERPL CALC-SCNC: 8 MMOL/L (ref 8–16)
ANION GAP SERPL CALC-SCNC: 9 MMOL/L (ref 8–16)
AST SERPL-CCNC: 42 U/L (ref 10–40)
BASOPHILS # BLD AUTO: 0.05 K/UL (ref 0–0.2)
BASOPHILS NFR BLD: 0.7 % (ref 0–1.9)
BILIRUB SERPL-MCNC: 0.7 MG/DL (ref 0.1–1)
BUN SERPL-MCNC: 23 MG/DL (ref 8–23)
BUN SERPL-MCNC: 26 MG/DL (ref 8–23)
CALCIUM SERPL-MCNC: 9.3 MG/DL (ref 8.7–10.5)
CALCIUM SERPL-MCNC: 9.6 MG/DL (ref 8.7–10.5)
CHLORIDE SERPL-SCNC: 91 MMOL/L (ref 95–110)
CHLORIDE SERPL-SCNC: 95 MMOL/L (ref 95–110)
CO2 SERPL-SCNC: 35 MMOL/L (ref 23–29)
CO2 SERPL-SCNC: 38 MMOL/L (ref 23–29)
CREAT SERPL-MCNC: 0.7 MG/DL (ref 0.5–1.4)
CREAT SERPL-MCNC: 0.8 MG/DL (ref 0.5–1.4)
DIFFERENTIAL METHOD BLD: ABNORMAL
EOSINOPHIL # BLD AUTO: 0 K/UL (ref 0–0.5)
EOSINOPHIL NFR BLD: 0.3 % (ref 0–8)
ERYTHROCYTE [DISTWIDTH] IN BLOOD BY AUTOMATED COUNT: 16 % (ref 11.5–14.5)
EST. GFR  (NO RACE VARIABLE): >60 ML/MIN/1.73 M^2
EST. GFR  (NO RACE VARIABLE): >60 ML/MIN/1.73 M^2
GLUCOSE SERPL-MCNC: 155 MG/DL (ref 70–110)
GLUCOSE SERPL-MCNC: 184 MG/DL (ref 70–110)
HCT VFR BLD AUTO: 32.1 % (ref 37–48.5)
HGB BLD-MCNC: 10.1 G/DL (ref 12–16)
IMM GRANULOCYTES # BLD AUTO: 0.04 K/UL (ref 0–0.04)
IMM GRANULOCYTES NFR BLD AUTO: 0.5 % (ref 0–0.5)
LYMPHOCYTES # BLD AUTO: 1.3 K/UL (ref 1–4.8)
LYMPHOCYTES NFR BLD: 18.1 % (ref 18–48)
MAGNESIUM SERPL-MCNC: 1.9 MG/DL (ref 1.6–2.6)
MCH RBC QN AUTO: 29.1 PG (ref 27–31)
MCHC RBC AUTO-ENTMCNC: 31.5 G/DL (ref 32–36)
MCV RBC AUTO: 93 FL (ref 82–98)
MONOCYTES # BLD AUTO: 1 K/UL (ref 0.3–1)
MONOCYTES NFR BLD: 13.4 % (ref 4–15)
NEUTROPHILS # BLD AUTO: 5 K/UL (ref 1.8–7.7)
NEUTROPHILS NFR BLD: 67 % (ref 38–73)
NRBC BLD-RTO: 0 /100 WBC
PHOSPHATE SERPL-MCNC: 2.4 MG/DL (ref 2.7–4.5)
PHOSPHATE SERPL-MCNC: 3.1 MG/DL (ref 2.7–4.5)
PLATELET # BLD AUTO: 311 K/UL (ref 150–450)
PMV BLD AUTO: 10.4 FL (ref 9.2–12.9)
POCT GLUCOSE: 144 MG/DL (ref 70–110)
POTASSIUM SERPL-SCNC: 3.8 MMOL/L (ref 3.5–5.1)
POTASSIUM SERPL-SCNC: 4.6 MMOL/L (ref 3.5–5.1)
PROT SERPL-MCNC: 7 G/DL (ref 6–8.4)
RBC # BLD AUTO: 3.47 M/UL (ref 4–5.4)
SODIUM SERPL-SCNC: 137 MMOL/L (ref 136–145)
SODIUM SERPL-SCNC: 139 MMOL/L (ref 136–145)
WBC # BLD AUTO: 7.4 K/UL (ref 3.9–12.7)

## 2024-10-04 PROCEDURE — 80069 RENAL FUNCTION PANEL: CPT | Performed by: HOSPITALIST

## 2024-10-04 PROCEDURE — 25000242 PHARM REV CODE 250 ALT 637 W/ HCPCS: Performed by: NURSE PRACTITIONER

## 2024-10-04 PROCEDURE — 85025 COMPLETE CBC W/AUTO DIFF WBC: CPT | Performed by: HOSPITALIST

## 2024-10-04 PROCEDURE — 83735 ASSAY OF MAGNESIUM: CPT | Performed by: HOSPITALIST

## 2024-10-04 PROCEDURE — 25000003 PHARM REV CODE 250: Performed by: HOSPITALIST

## 2024-10-04 PROCEDURE — 94640 AIRWAY INHALATION TREATMENT: CPT

## 2024-10-04 PROCEDURE — 20600001 HC STEP DOWN PRIVATE ROOM

## 2024-10-04 PROCEDURE — 25000003 PHARM REV CODE 250: Performed by: INTERNAL MEDICINE

## 2024-10-04 PROCEDURE — 63600175 PHARM REV CODE 636 W HCPCS: Performed by: INTERNAL MEDICINE

## 2024-10-04 PROCEDURE — 27000221 HC OXYGEN, UP TO 24 HOURS

## 2024-10-04 PROCEDURE — 80053 COMPREHEN METABOLIC PANEL: CPT | Performed by: HOSPITALIST

## 2024-10-04 PROCEDURE — 63600175 PHARM REV CODE 636 W HCPCS: Performed by: HOSPITALIST

## 2024-10-04 PROCEDURE — 84100 ASSAY OF PHOSPHORUS: CPT | Performed by: HOSPITALIST

## 2024-10-04 PROCEDURE — 36415 COLL VENOUS BLD VENIPUNCTURE: CPT | Performed by: HOSPITALIST

## 2024-10-04 PROCEDURE — 99900035 HC TECH TIME PER 15 MIN (STAT)

## 2024-10-04 RX ORDER — GABAPENTIN 300 MG/1
300 CAPSULE ORAL NIGHTLY
Qty: 30 CAPSULE | Refills: 11 | Status: SHIPPED | OUTPATIENT
Start: 2024-10-04 | End: 2025-10-04

## 2024-10-04 RX ADMIN — FUROSEMIDE 80 MG: 10 INJECTION, SOLUTION INTRAVENOUS at 09:10

## 2024-10-04 RX ADMIN — CLOPIDOGREL BISULFATE 75 MG: 75 TABLET ORAL at 10:10

## 2024-10-04 RX ADMIN — ATORVASTATIN CALCIUM 10 MG: 10 TABLET, FILM COATED ORAL at 10:10

## 2024-10-04 RX ADMIN — MEROPENEM 1 G: 1 INJECTION INTRAVENOUS at 01:10

## 2024-10-04 RX ADMIN — ACETAMINOPHEN 650 MG: 325 TABLET ORAL at 07:10

## 2024-10-04 RX ADMIN — ACETAMINOPHEN 650 MG: 325 TABLET ORAL at 06:10

## 2024-10-04 RX ADMIN — PANTOPRAZOLE SODIUM 40 MG: 40 TABLET, DELAYED RELEASE ORAL at 10:10

## 2024-10-04 RX ADMIN — MEROPENEM 1 G: 1 INJECTION INTRAVENOUS at 09:10

## 2024-10-04 RX ADMIN — POTASSIUM & SODIUM PHOSPHATES POWDER PACK 280-160-250 MG 2 PACKET: 280-160-250 PACK at 06:10

## 2024-10-04 RX ADMIN — FUROSEMIDE 80 MG: 10 INJECTION, SOLUTION INTRAVENOUS at 08:10

## 2024-10-04 RX ADMIN — RANOLAZINE 500 MG: 500 TABLET, EXTENDED RELEASE ORAL at 08:10

## 2024-10-04 RX ADMIN — IPRATROPIUM BROMIDE AND ALBUTEROL SULFATE 3 ML: 2.5; .5 SOLUTION RESPIRATORY (INHALATION) at 09:10

## 2024-10-04 RX ADMIN — DULOXETINE HYDROCHLORIDE 20 MG: 20 CAPSULE, DELAYED RELEASE ORAL at 10:10

## 2024-10-04 RX ADMIN — IPRATROPIUM BROMIDE AND ALBUTEROL SULFATE 3 ML: 2.5; .5 SOLUTION RESPIRATORY (INHALATION) at 07:10

## 2024-10-04 RX ADMIN — LORAZEPAM 0.5 MG: 0.5 TABLET ORAL at 11:10

## 2024-10-04 RX ADMIN — CARVEDILOL 6.25 MG: 6.25 TABLET, FILM COATED ORAL at 05:10

## 2024-10-04 RX ADMIN — IPRATROPIUM BROMIDE AND ALBUTEROL SULFATE 3 ML: 2.5; .5 SOLUTION RESPIRATORY (INHALATION) at 05:10

## 2024-10-04 RX ADMIN — MEROPENEM 1 G: 1 INJECTION INTRAVENOUS at 05:10

## 2024-10-04 RX ADMIN — GABAPENTIN 300 MG: 300 CAPSULE ORAL at 08:10

## 2024-10-04 RX ADMIN — APIXABAN 5 MG: 5 TABLET, FILM COATED ORAL at 08:10

## 2024-10-04 RX ADMIN — POTASSIUM & SODIUM PHOSPHATES POWDER PACK 280-160-250 MG 2 PACKET: 280-160-250 PACK at 10:10

## 2024-10-04 RX ADMIN — MONTELUKAST 10 MG: 10 TABLET, FILM COATED ORAL at 10:10

## 2024-10-04 RX ADMIN — IPRATROPIUM BROMIDE AND ALBUTEROL SULFATE 3 ML: 2.5; .5 SOLUTION RESPIRATORY (INHALATION) at 01:10

## 2024-10-04 RX ADMIN — CARVEDILOL 6.25 MG: 6.25 TABLET, FILM COATED ORAL at 08:10

## 2024-10-04 RX ADMIN — APIXABAN 5 MG: 5 TABLET, FILM COATED ORAL at 09:10

## 2024-10-04 NOTE — PLAN OF CARE
[facility] - Cardiology Stepdown  Discharge Reassessment    Primary Care Provider: Krzysztof Mcgraw MD    Expected Discharge Date: 10/5/2024    Reassessment (most recent)       Discharge Reassessment - 10/04/24 1211          Discharge Reassessment    Assessment Type Discharge Planning Reassessment     Did the patient's condition or plan change since previous assessment? Yes     Communicated MIMI with patient/caregiver Yes     Discharge Plan A Home Health (P)      Discharge Plan B Home with family (P)      DME Needed Upon Discharge  other (see comments) (P)    TBD.    Transition of Care Barriers None (P)      Why the patient remains in the hospital Requires continued medical care (P)         Post-Acute Status    Post-Acute Authorization Home Health (P)      Home Health Status Referrals Sent (P)    [facility] with [facility]    Coverage [facility] Medicare (P)                    Planning for discharge on tomorrow, 10/05/24. Home with [facility].     Discharge Plan A and Plan B have been determined by review of patient's clinical status, future medical and therapeutic needs, and coverage/benefits for post-acute care in coordination with multidisciplinary team members.     Martinez Thompson LMSW

## 2024-10-04 NOTE — PLAN OF CARE
Problem: Adult Inpatient Plan of Care  Goal: Plan of Care Review  Outcome: Progressing  Goal: Patient-Specific Goal (Individualized)  Outcome: Progressing  Goal: Absence of Hospital-Acquired Illness or Injury  Outcome: Progressing  Goal: Optimal Comfort and Wellbeing  Outcome: Progressing  Goal: Readiness for Transition of Care  Outcome: Progressing     Problem: Diabetes Comorbidity  Goal: Blood Glucose Level Within Targeted Range  Outcome: Progressing     Problem: Acute Kidney Injury/Impairment  Goal: Fluid and Electrolyte Balance  Outcome: Progressing  Goal: Improved Oral Intake  Outcome: Progressing  Goal: Effective Renal Function  Outcome: Progressing     Problem: Wound  Goal: Optimal Coping  Outcome: Progressing  Goal: Optimal Functional Ability  Outcome: Progressing  Goal: Absence of Infection Signs and Symptoms  Outcome: Progressing  Goal: Improved Oral Intake  Outcome: Progressing  Goal: Optimal Pain Control and Function  Outcome: Progressing  Goal: Skin Health and Integrity  Outcome: Progressing  Goal: Optimal Wound Healing  Outcome: Progressing     Problem: Skin Injury Risk Increased  Goal: Skin Health and Integrity  Outcome: Progressing     Problem: Fall Injury Risk  Goal: Absence of Fall and Fall-Related Injury  Outcome: Progressing         Plan of care reviewed with patient//daughter.     Patient is AOX4/NAD     Patient remained free of falls and trauma, fall precautions are in place.    Patient is able to stand and pivot with 1-2 person assist.     Patient denies reports of pain     Patients care plan and medication discussed with /daughter.     Patient//daughter have several questions at this time/ Questions and concerns addressed and patient/family verbalized understanding.     Bed is in low position and wheels are locked for patient safety.      The call light is within pt's reach.     Telemetry is transmitting    Pt remains free of falls, injury, and trauma.

## 2024-10-04 NOTE — PROGRESS NOTES
Ronald Heredia - Cardiology [facility]  Progress Note    Patient Name: Adriana Strong  MRN: [id number]  Patient Class: IP- Inpatient   Admission Date: 9/29/2024  Length of Stay: 3 days  Attending Physician: Cody Napoles MD  Primary Care Provider: Krzysztof Mcgraw MD        Subjective:     Principal Problem:Chronic heart failure with preserved ejection fraction        HPI:  80 yo F with PMHx HF with recovered EF, hx of TAVR, Afib on elqiuis, HLD, HTN, and COPD on home 2L O2 who presents to the ED complaining of weakness/generalized malaise since her discharge yesterday. Pt. Was just admitted to this facility 9/24-9/28 for for decompensated heart failure. She was diuresed for 5 days with IV lasix, and she reports that she was feeeling well at discharge yesterday. Today, however, she woke up with lightheadedness and agtigue. She felt very weak when trying to just get out of bed and move around her house. She noted her BP was lower than usual (reported to be 80's/50's, so she came back to the ED for further evaluation. She denies any fevers, chills, SOB, chest pain, or palpitations.    Overview/Hospital Course:  Patient was readmitted for hypotension with TACOS, she received IV fluids and subsequently had significant pulmonary edema.  She was diuresed and Nephrology was consulted.  Volume status improving    10/3 hypotension with TACOS, she received IV fluids and subsequently had significant pulmonary edema.  diuresed and Nephrology was consulted. TACOS resolved- likely from reduced oral intake and  diuresis. Volume status improving. negative balance of  2.2 L  K and Mg replaced. continue lasix 80mg IV q 12h. sats 100% on 2L NC . UC from 9/29 with pseudomonas.  IV meropenem started   10/4 improved oral intake. continue meropenem x 3 days. CX ray -Persistent increased size of the cardiac silhouette with a biventricular pacemaker. Similar mild prominence of interstitial markings within both lungs with low lung  volumes. There is no blunting of costophrenic angles.         Review of Systems:   Pain scale:   Constitutional:  fever,  chills, headache, vision loss, hearing loss, weight loss, Generalized weakness, falls, loss of smell, loss of taste, poor appetite,  sore throat  Respiratory: cough, shortness of breath.   Cardiovascular: chest pain, dizziness, palpitations, orthopnea, swelling of feet, syncope  Gastrointestinal: nausea, vomiting, abdominal pain, diarrhea, black stool,  blood in stool, change in bowel habits, constipation  Genitourinary: hematuria, dysuria, urgency, frequency  Integument/Breast: rash,  pruritis  Hematologic/Lymphatic: easy bruising, lymphadenopathy  Musculoskeletal: arthralgias , myalgias, back pain, neck pain, knee pain  Neurological: confusion, seizures, tremors, slurred speech  Behavioral/Psych:  depression, anxiety, auditory or visual hallucinations     OBJECTIVE:     Physical Exam:  Body mass index is 36.03 kg/m².    Constitutional: Appears well-developed and well-nourished. severe obesity   Head: Normocephalic and atraumatic.   Neck: Normal range of motion. Neck supple.   Cardiovascular: Normal heart rate.  Regular heart rhythm. + rales   Pulmonary/Chest: Effort normal.   Abdominal: + distension.  No tenderness  Musculoskeletal: Normal range of motion.+  edema.   Neurological: Alert and oriented to person, place, and time.   Skin: Skin is warm and dry.   Psychiatric: Normal mood and affect. Behavior is normal.                  Vital Signs  Temp: 98.3 °F (36.8 °C) (10/04/24 1528)  Pulse: (!) 59 (10/04/24 1528)  Resp: 20 (10/04/24 1528)  BP: (!) 151/68 (10/04/24 1528)  SpO2: 98 % (10/04/24 1528)     24 Hour VS Range    Temp:  [97.5 °F (36.4 °C)-98.7 °F (37.1 °C)]   Pulse:  [59-88]   Resp:  [17-20]   BP: (134-161)/(64-83)   SpO2:  [92 %-98 %]     Intake/Output Summary (Last 24 hours) at 10/4/2024 1649  Last data filed at 10/4/2024 1400  Gross per 24 hour   Intake 957 ml   Output 1300 ml   Net  "-343 ml         I/O This Shift:  I/O this shift:  In: 477 [P.O.:477]  Out: 200 [Urine:200]    Wt Readings from Last 3 Encounters:   09/30/24 95.2 kg (209 lb 14.1 oz)   09/28/24 90.6 kg (199 lb 11.8 oz)   09/24/24 95.7 kg (211 lb)       I have personally reviewed the vitals and recorded Intake/Output     Laboratory/Diagnostic Data:    CBC/Anemia Labs: Coags:    Recent Labs   Lab 10/02/24  0332 10/03/24  0249 10/04/24  0837   WBC 8.90 7.25 7.40   HGB 9.4* 9.2* 10.1*   HCT 30.3* 28.5* 32.1*    253 311   MCV 94 90 93   RDW 16.0* 15.9* 16.0*    No results for input(s): "PT", "INR", "APTT" in the last 168 hours.     Chemistries: ABG:   Recent Labs   Lab 09/29/24  1539 09/30/24  0248 09/30/24  1356 10/01/24  0200 10/01/24  1512 10/02/24  0332 10/03/24  0249 10/03/24  1651 10/04/24  0837   *   < > 133* 133*   < > 136 136 138 139   K 4.5   < > 4.6 4.0   < > 3.8 3.3* 3.8 4.6   CL 93*   < > 90* 92*   < > 92* 91* 89* 95   CO2 26   < > 34* 31*   < > 30* 35* 40* 35*   BUN 49*   < > 52* 49*   < > 36* 30* 26* 26*   CREATININE 1.9*   < > 1.6* 1.1   < > 0.8 0.7 0.7 0.7   CALCIUM 8.8   < > 9.7 9.7   < > 9.8 9.0 9.4 9.6   PROT 6.3   < >  --  7.0  --  6.8  --   --  7.0   BILITOT 0.7   < >  --  0.6  --  0.7  --   --  0.7   ALKPHOS 33*   < >  --  54*  --  41*  --   --  51*   ALT 6*   < >  --  41  --  43  --   --  45*   AST 17   < >  --  68*  --  45*  --   --  42*   MG 1.9  --  2.2  --   --   --  1.6  --  1.9   PHOS 4.4  --   --   --   --   --   --  2.1* 3.1    < > = values in this interval not displayed.    Recent Labs   Lab 09/30/24  0941 10/01/24  1016   PH 7.299* 7.443   PCO2 25.7* 54.4*   PO2 41 83   HCO3 12.6* 37.2*   POCSATURATED 72 96   BE -14* 13*        POCT Glucose: HbA1c:    Recent Labs   Lab 09/28/24  0618 09/28/24  1100 09/30/24  0655 09/30/24  1148 09/30/24  1507 10/01/24  0637   POCTGLUCOSE 122* 154* 139* 188* 164* 138*    Hemoglobin A1C   Date Value Ref Range Status   09/25/2024 4.9 4.0 - 5.6 % Final     " "Comment:     ADA Screening Guidelines:  5.7-6.4%  Consistent with prediabetes  >or=6.5%  Consistent with diabetes    High levels of fetal hemoglobin interfere with the HbA1C  assay. Heterozygous hemoglobin variants (HbS, HgC, etc)do  not significantly interfere with this assay.   However, presence of multiple variants may affect accuracy.     02/09/2024 5.0 4.0 - 5.6 % Final     Comment:     ADA Screening Guidelines:  5.7-6.4%  Consistent with prediabetes  >or=6.5%  Consistent with diabetes    High levels of fetal hemoglobin interfere with the HbA1C  assay. Heterozygous hemoglobin variants (HbS, HgC, etc)do  not significantly interfere with this assay.   However, presence of multiple variants may affect accuracy.     09/21/2023 5.3 4.0 - 5.6 % Final     Comment:     ADA Screening Guidelines:  5.7-6.4%  Consistent with prediabetes  >or=6.5%  Consistent with diabetes    High levels of fetal hemoglobin interfere with the HbA1C  assay. Heterozygous hemoglobin variants (HbS, HgC, etc)do  not significantly interfere with this assay.   However, presence of multiple variants may affect accuracy.          Cardiac Enzymes: Ejection Fractions:    No results for input(s): "CPK", "CPKMB", "MB", "TROPONINI" in the last 72 hours. EF   Date Value Ref Range Status   02/10/2024 35 % Final   10/01/2022 55 % Final     Nuc Stress EF   Date Value Ref Range Status   02/14/2024 65 % Final   10/27/2023 67 % Final     Nuc Rest EF   Date Value Ref Range Status   02/14/2024 61  Final   10/27/2023 67  Final          No results for input(s): "COLORU", "APPEARANCEUA", "PHUR", "SPECGRAV", "PROTEINUA", "GLUCUA", "KETONESU", "BILIRUBINUA", "OCCULTUA", "NITRITE", "UROBILINOGEN", "LEUKOCYTESUR", "RBCUA", "WBCUA", "BACTERIA", "SQUAMEPITHEL", "HYALINECASTS" in the last 48 hours.    Invalid input(s): "WRIGHTSUR"    Procalcitonin (ng/mL)   Date Value   09/30/2022 0.06   10/19/2020 0.09   01/02/2017 <0.09     Lactate (Lactic Acid) (mmol/L)   Date Value " "  09/29/2024 1.1   09/30/2022 2.4 (H)   12/04/2021 2.4 (H)   11/26/2019 2.3 (H)   05/29/2017 1.2     BNP (pg/mL)   Date Value   09/29/2024 458 (H)   09/24/2024 425 (H)   03/14/2024 247 (H)   02/29/2024 171 (H)   02/21/2024 331 (H)     CRP (mg/L)   Date Value   04/21/2011 7.9   03/03/2009 7.4     Sed Rate   Date Value   01/21/2016 25 mm/Hr (H)   04/21/2011 29 mm/hr (H)   03/03/2009 25 mm/hr (H)     D-Dimer (mg/L FEU)   Date Value   06/22/2017 0.64 (H)   07/01/2016 0.33     Ferritin (ng/mL)   Date Value   09/30/2022 81   07/01/2016 16 (L)   11/08/2015 20     No results found for: "LDH"  Troponin I (ng/mL)   Date Value   09/30/2024 0.024   09/29/2024 0.042 (H)   09/24/2024 <0.006   02/13/2024 0.011   02/09/2024 <0.006   09/22/2023 <0.006   09/21/2023 <0.006   09/21/2023 <0.006     CPK (U/L)   Date Value   08/11/2022 54   08/25/2020 55   07/01/2016 52   07/01/2016 52   05/07/2015 55   11/24/2014 54   05/07/2011 1010 (H)   05/07/2011 327 (H)     No results found. However, due to the size of the patient record, not all encounters were searched. Please check Results Review for a complete set of results.  SARS-CoV2 (COVID-19) Qualitative PCR (no units)   Date Value   05/14/2020 Not Detected     SARS-CoV-2 RNA, Amplification, Qual (no units)   Date Value   10/29/2020 Negative   10/19/2020 Negative     POC Rapid COVID (no units)   Date Value   09/30/2022 Negative   08/11/2022 Negative   08/17/2021 Negative   11/10/2020 Negative       Microbiology labs for the last week  Microbiology Results (last 7 days)       Procedure Component Value Units Date/Time    Urine culture [[id number]]  (Abnormal)  (Susceptibility) Collected: 09/29/24 7688    Order Status: Completed Specimen: Urine Updated: 10/03/24 1020     Urine Culture, Routine PSEUDOMONAS AERUGINOSA  >100,000 cfu/ml      Narrative:      Add on UUNR to #[id number] per Ruddy Boyd MD on  09/30/2024  03:28     Specimen Source->Urine    Urine culture [[id number]] Collected: " 09/30/24 1555    Order Status: Completed Specimen: Urine Updated: 10/01/24 2053     Urine Culture, Routine Multiple organisms isolated. None in predominance.  Repeat if      clinically necessary.    Narrative:      Specimen Source->Urine            Reviewed and noted in plan where applicable- Please see chart for full lab data.    Lines/Drains:       Peripheral IV - Single Lumen 10/02/24 2342 20 G Anterior;Right Forearm (Active)   Number of days: 0       Female External Urinary Catheter w/ Suction 09/29/24 1734 (Active)   Skin no breakdown;no redness 10/03/24 0000   Tolerance no signs/symptoms of discomfort 10/03/24 0000   Suction Continuous suction at 70 mmHg 10/02/24 2000   Date of last wick change 10/02/24 10/02/24 2000   Time of last wick change 1355 10/02/24 1355   Output (mL) 300 mL 10/01/24 1301   Number of days: 3       Imaging  ECG Results              EKG 12-lead (Final result)        Collection Time Result Time QRS Duration OHS QTC Calculation    09/29/24 15:29:53 09/30/24 14:18:41 94 250                     Final result by Interface, Lab In [facility] (09/30/24 14:18:43)                   Narrative:    Test Reason : R53.1,    Vent. Rate : 133 BPM     Atrial Rate : 058 BPM     P-R Int : 000 ms          QRS Dur : 094 ms      QT Int : 168 ms       P-R-T Axes : 000 092 097 degrees     QTc Int : 250 ms    Marked artifact  Atrial flutter with what is probably ventricular pacing  Rightward axis  Anteroseptal infarct ,age undetermined  Abnormal ECG  When compared with ECG of 24-SEP-2024 17:10,  Poor data quality in current ECG precludes serial comparison  Confirmed by Krzysztof Francisco MD ([id number]) on 9/30/2024 2:18:34 PM    Referred By: [email or web address]   SELF           Confirmed By:Krzysztof Francisco MD                                    Results for orders placed during the hospital encounter of 09/24/24    Echo    Interpretation Summary    Left Ventricle: The left ventricle is normal in size. Normal wall thickness. There is  concentric remodeling. There is normal systolic function with a visually estimated ejection fraction of 60 - 65%. Grade III diastolic dysfunction. Elevated left ventricular filling pressure.    Right Ventricle: Normal right ventricular cavity size. Wall thickness is normal. Systolic function is normal. Pacemaker lead present in the ventricle.    The left atrium is severely dilated.    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 It is reported to be a 26mm Phan Suzi S3 valve. Aortic valve area by VTI is 1.90 cm2. Aortic valve peak velocity is 1.64 m/s. Mean gradient is 6 mmHg. The dimensionless index is 0.57. There is mild aortic regurgitation.    Tricuspid Valve: There is mild regurgitation.    Pulmonary Artery: The estimated pulmonary artery systolic pressure is 48 mmHg.    IVC/SVC: Elevated venous pressure at 15 mmHg.    Pericardium: There is a small posterior effusion. No indication of cardiac tamponade.      X-Ray Chest 1 View  Narrative: EXAMINATION:  XR CHEST 1 VIEW    CLINICAL HISTORY:  SOB;    TECHNIQUE:  Single frontal view of the chest was performed.    COMPARISON:  10/01/2024    FINDINGS:  Persistent increased size of the cardiac silhouette with a biventricular pacemaker.  Similar mild prominence of interstitial markings within both lungs with low lung volumes.  There is no blunting of costophrenic angles.  Impression: Stable findings.    Electronically signed by: Isma Mcintyre  Date:    10/04/2024  Time:    10:02      Labs, Imaging, EKG and Diagnostic results from 10/4/2024 were reviewed.    Medications:  Medication list was reviewed and changes noted under Assessment/Plan.  Current Facility-Administered Medications on File Prior to Encounter   Medication Dose Route Frequency Provider Last Rate Last Admin    0.9%  NaCl infusion   Intravenous Continuous Lynette Hightower NP   New Bag at 05/29/24 1440    sodium chloride 0.9% flush 5 mL  5 mL Intravenous PRN  Lynette Hightower NP        vancomycin (VANCOCIN) 1,000 mg in dextrose 5 % (D5W) 250 mL IVPB (Vial-Mate)  1,000 mg Intravenous On Call Procedure Lynette Hightower NP   1,000 mg at 05/29/24 1454     Current Outpatient Medications on File Prior to Encounter   Medication Sig Dispense Refill    acetaminophen (TYLENOL) 650 MG TbSR Take 1,300 mg by mouth 2 (two) times daily as needed.      albuterol-ipratropium (DUO-NEB) 2.5 mg-0.5 mg/3 mL nebulizer solution Take 3 mLs by nebulization every 4 (four) hours as needed for Wheezing or Shortness of Breath.      apixaban (ELIQUIS) 5 mg Tab Take 1 tablet (5 mg total) by mouth 2 (two) times daily. 180 tablet 3    ascorbic acid, vitamin C, (VITAMIN C) 500 MG tablet Take 1,000 mg by mouth once daily.       atorvastatin (LIPITOR) 10 MG tablet Take 1 tablet (10 mg total) by mouth once daily. 30 tablet 0    calcium carbonate/vitamin D3 (CALTRATE 600 + D ORAL) Take 1 tablet by mouth once daily.      carvediloL (COREG) 6.25 MG tablet Take 1 tablet (6.25 mg total) by mouth 2 (two) times daily with meals. 180 tablet 3    clopidogreL (PLAVIX) 75 mg tablet Take 1 tablet (75 mg total) by mouth once daily. 90 tablet 3    DULoxetine (CYMBALTA) 20 MG capsule Take 1 capsule (20 mg total) by mouth once daily. 30 capsule 0    estradioL (ESTRACE) 0.01 % (0.1 mg/gram) vaginal cream Place vaginally.      fexofenadine (ALLEGRA) 60 MG tablet Take 60 mg by mouth once daily.      furosemide (LASIX) 40 MG tablet Take 1 tablet (40 mg total) by mouth 2 (two) times a day.      gabapentin (NEURONTIN) 600 MG tablet Take 600 mg by mouth 3 (three) times daily.      HYDROcodone-acetaminophen (NORCO) 5-325 mg per tablet Take 1 tablet by mouth every 4 (four) hours as needed for Pain. (Patient not taking: Reported on 9/24/2024) 12 tablet 0    isosorbide mononitrate (IMDUR) 60 MG 24 hr tablet Take 1 tablet (60 mg total) by mouth once daily. 30 tablet 0    LORazepam (ATIVAN) 0.5 MG tablet Take 1 tablet (0.5  mg total) by mouth every morning. 30 tablet 2    losartan (COZAAR) 50 MG Tab Take 1 tablet (50 mg total) by mouth once daily. 30 tablet 11    metFORMIN (GLUCOPHAGE) 500 MG tablet Take 1 tablet (500 mg total) by mouth 2 (two) times daily. 60 tablet 0    montelukast (SINGULAIR) 10 mg tablet Take 10 mg by mouth once daily.      pantoprazole (PROTONIX) 40 MG tablet Take 1 tablet (40 mg total) by mouth once daily. 90 tablet 3    ranolazine (RANEXA) 1,000 mg Tb12 Take 1 tablet (1,000 mg total) by mouth 2 (two) times daily. 180 tablet 3     Scheduled Medications:  Current Facility-Administered Medications   Medication Dose Route Frequency    albuterol-ipratropium  3 mL Nebulization Q4H WAKE    apixaban  5 mg Oral BID    atorvastatin  10 mg Oral Daily    carvediloL  6.25 mg Oral BID WM    clopidogreL  75 mg Oral Daily    DULoxetine  20 mg Oral Daily    furosemide (LASIX) injection  80 mg Intravenous Q12H    gabapentin  300 mg Oral QHS    meropenem IV (PEDS and ADULTS)  1 g Intravenous Q8H    montelukast  10 mg Oral Daily    pantoprazole  40 mg Oral Daily    ranolazine  500 mg Oral BID     PRN:   Current Facility-Administered Medications:     acetaminophen, 650 mg, Oral, Q4H PRN    albuterol-ipratropium, 3 mL, Nebulization, Q4H PRN    dextrose 10%, 12.5 g, Intravenous, PRN    dextrose 10%, 25 g, Intravenous, PRN    glucagon (human recombinant), 1 mg, Intramuscular, PRN    glucose, 16 g, Oral, PRN    glucose, 24 g, Oral, PRN    LORazepam, 0.5 mg, Oral, Q12H PRN    melatonin, 6 mg, Oral, Nightly PRN    naloxone, 0.02 mg, Intravenous, PRN    ondansetron, 4 mg, Intravenous, Q8H PRN    prochlorperazine, 5 mg, Intravenous, Q6H PRN    sodium chloride 0.9%, 10 mL, Intravenous, PRN  Infusions:   Estimated Creatinine Clearance: 70.5 mL/min (based on SCr of 0.7 mg/dL).           Assessment/Plan:      * Chronic heart failure with preserved ejection fraction  -TTE showed normal EF, Grade III DD, TAVR, CVP was elevated at the time  -CXR  today shows persistent L sided effusion, mild pulm edema, similar to prior. Pt. Stable on home O2 without respiratory distress  -Gentle hydration given in ED in setting of hypotension/TACOS. Continue to monitor volume status closely and restart lasix when appropriate    9/30  Received 1 L of IV fluids and subsequently had significant clinical pulmonary edema, given Lasix 80 mg IV once and we will reassess how she response.  VBG as she may have some compensated alkalosis given her respiratory distress  10/1  Patient has decompensated diastolic heart failure with florid pulmonary edema noted on chest x-ray today, she does have respiratory distress on examination today.  ABG done, interpretation of possible mixed base disorder and main  of contraction alkalosis.  Given the complexities re volume status management nephrology and Cardiology were consulted.  Thus far nephrology recommends continue with 80 mg Lasix b.i.d. we will do b.i.d. BMP.  Input output monitoring and fluid restrictions for now.  Patient was previously treated for UTI, prescribed oral Augmentin by provider based on sensitivities.  Continue with Augmentin renally dosed and follow up with urine culture  10/2  Continue with Lasix 80 mg IV b.i.d., input output monitoring fluid restrictions.  Continue Augmentin and follow up with isolate and sensitivities.  Nephrology on board    10/3 hypotension with TACOS, she received IV fluids and subsequently had significant pulmonary edema.  diuresed and Nephrology was consulted. TACOS resolved- likely from reduced oral intake and  diuresis. Volume status improving. negative balance of  2.2 L     Anemia  Patient's with Normocytic anemia.. Hemoglobin stable. Etiology likely due to chronic disease .  Current CBC reviewed-    Recent Labs   Lab 10/01/24  0200 10/02/24  0332 10/03/24  0249   HGB 9.4* 9.4* 9.2*         Component Value Date/Time    MCV 90 10/03/2024 0249    RDW 15.9 (H) 10/03/2024 0249    IRON 39 09/30/2022  1324    FERRITIN 81 09/30/2022 1324    FOLATE >24.0 08/24/2016 0723    [id number] 846 02/19/2019 1435     Monitor CBC and transfuse if H/H drops below 7/21.      Chronic respiratory failure with hypoxia  -Pt. Stable on home 2 L NC. CXR notes mild pulm edema and L sided pleural effusion, consider CT chest for further work-up if respiratory status worsening.    10/3 sats 100% on 2L NC .     Acute on chronic combined systolic and diastolic congestive heart failure  - on presentation      TACOS (acute kidney injury)  -Cr 1.9 on presentation, baseline <1, was normal 1 day prior. Reports hypotension at home and noted to have low-normal BP in ED  -Urine studies pending, plan to hold home BP meds and lasix  for tonight and gently hydrate  -Renally dose medications and continue to trend Cr while admitted  10/3   BP and kidney functions Improving, continue to monitor kidney functions  Recent Labs   Lab 10/01/24  1512 10/02/24  0332 10/03/24  0249   BUN 44* 36* 30*   CREATININE 0.9 0.8 0.7       I & O     Intake/Output Summary (Last 24 hours) at 10/3/2024 0644  Last data filed at 10/3/2024 0456  Gross per 24 hour   Intake 480 ml   Output 1450 ml   Net -970 ml        Permanent atrial fibrillation  -Pt. In paced rhythm on admit, continue to monitor on telemetry. Continue home eliquis for AC    Essential hypertension  -Pt. BP low-normal with TACOS on presentation. Hold home BP meds for now and restart GDMT meds as tolerated.  10/3 BP improved to 140s    Type 2 diabetes mellitus  -A1c 4.9 and has been persistently in normal range.  Hold metformin, consider stopping at discharge given new renal dysfunction with normal A1c      VTE Risk Mitigation (From admission, onward)           Ordered     apixaban tablet 5 mg  2 times daily         10/01/24 0946                    Discharge Planning   MIMI: 10/5/2024     Code Status: Full Code   Is the patient medically ready for discharge?:     Reason for patient still in hospital (select  all that apply): Treatment  Discharge Plan A: Home Health                  Cody Napoles MD  Department of Hospital Medicine   [facility]

## 2024-10-04 NOTE — DISCHARGE SUMMARY
Ronald Heredia - Cardiology [facility] Medicine  Discharge Summary      Patient Name: Adriana Strong  MRN: [id number]  LENNOX: [id number]  Patient Class: IP- Inpatient  Admission Date: 9/29/2024  Hospital Length of Stay: 4 days  Discharge Date and Time:  10/05/2024 4:55 PM  Attending Physician: Cody Napoles MD   Discharging Provider: Cody Napoles MD  Primary Care Provider: Krzysztof Mcgraw MD  Hospital Medicine Team: [facility]  Cody Napoles MD  Primary Care Team: [facility]     HPI:   78 yo F with PMHx HF with recovered EF, hx of TAVR, Afib on elqiuis, HLD, HTN, and COPD on home 2L O2 who presents to the ED complaining of weakness/generalized malaise since her discharge yesterday. Pt. Was just admitted to this facility 9/24-9/28 for for decompensated heart failure. She was diuresed for 5 days with IV lasix, and she reports that she was feeeling well at discharge yesterday. Today, however, she woke up with lightheadedness and agtigue. She felt very weak when trying to just get out of bed and move around her house. She noted her BP was lower than usual (reported to be 80's/50's, so she came back to the ED for further evaluation. She denies any fevers, chills, SOB, chest pain, or palpitations.    * No surgery found *      Hospital Course:   Patient was readmitted for hypotension with TACOS, she received IV fluids and subsequently had significant pulmonary edema.  She was diuresed and Nephrology was consulted.  Volume status improving    10/3 hypotension with TACOS, she received IV fluids and subsequently had significant pulmonary edema.  diuresed and Nephrology was consulted. TACOS resolved- likely from reduced oral intake and  diuresis. Volume status improving. negative balance of  2.2 L  K and Mg replaced. continue lasix 80mg IV q 12h. sats 100% on 2L NC . UC from 9/29 with pseudomonas.  IV meropenem started   10/4 improved oral intake. continue meropenem x 3 days. CX ray -Persistent increased size  of the cardiac silhouette with a biventricular pacemaker. Similar mild prominence of interstitial markings within both lungs with low lung volumes. There is no blunting of costophrenic angles.   10/5 completed meropenem today. discussed with family. discharge with home health      Goals of Care Treatment Preferences:  Code Status: Full Code    Living Will: Yes              SDOH Screening:  The patient was screened for utility difficulties, food insecurity, transport difficulties, housing insecurity, and interpersonal safety and there were no concerns identified this admission.     Consults:   Consults (From admission, onward)          Status Ordering Provider     Inpatient consult to Nephrology  Once        Provider:  (Not yet assigned)    Completed ROSALINA HART            Assessment/Plan:      * Chronic heart failure with preserved ejection fraction  -TTE showed normal EF, Grade III DD, TAVR, CVP was elevated at the time  -CXR today shows persistent L sided effusion, mild pulm edema, similar to prior. Pt. Stable on home O2 without respiratory distress  -Gentle hydration given in ED in setting of hypotension/TACOS. Continue to monitor volume status closely and restart lasix when appropriate     9/30  Received 1 L of IV fluids and subsequently had significant clinical pulmonary edema, given Lasix 80 mg IV once and we will reassess how she response.  VBG as she may have some compensated alkalosis given her respiratory distress  10/1  Patient has decompensated diastolic heart failure with florid pulmonary edema noted on chest x-ray today, she does have respiratory distress on examination today.  ABG done, interpretation of possible mixed base disorder and main  of contraction alkalosis.  Given the complexities re volume status management nephrology and Cardiology were consulted.  Thus far nephrology recommends continue with 80 mg Lasix b.i.d. we will do b.i.d. BMP.  Input output monitoring and fluid restrictions for  now.  Patient was previously treated for UTI, prescribed oral Augmentin by provider based on sensitivities.  Continue with Augmentin renally dosed and follow up with urine culture  10/2  Continue with Lasix 80 mg IV b.i.d., input output monitoring fluid restrictions.  Continue Augmentin and follow up with isolate and sensitivities.  Nephrology on board     10/3 hypotension with TACOS, she received IV fluids and subsequently had significant pulmonary edema.  diuresed and Nephrology was consulted. TACOS resolved- likely from reduced oral intake and  diuresis. Volume status improving. negative balance of  2.2 L      Anemia  Patient's with Normocytic anemia.. Hemoglobin stable. Etiology likely due to chronic disease .  Current CBC reviewed-          Recent Labs   Lab 10/03/24  0249 10/04/24  0837 10/05/24  0219   HGB 9.2* 10.1* 9.7*                Component Value Date/Time     MCV 90 10/05/2024 0219     RDW 15.9 (H) 10/05/2024 0219     IRON 39 09/30/2022 1324     FERRITIN 81 09/30/2022 1324     FOLATE >24.0 08/24/2016 0723     [id number] 846 02/19/2019 1435      Monitor CBC and transfuse if H/H drops below 7/21.       Chronic respiratory failure with hypoxia  -Pt. Stable on home 2 L NC. CXR notes mild pulm edema and L sided pleural effusion, consider CT chest for further work-up if respiratory status worsening.     10/3 sats 100% on 2L NC .      Acute on chronic combined systolic and diastolic congestive heart failure  - on presentation        TACOS (acute kidney injury)  -Cr 1.9 on presentation, baseline <1, was normal 1 day prior. Reports hypotension at home and noted to have low-normal BP in ED  -Urine studies pending, plan to hold home BP meds and lasix  for tonight and gently hydrate  -Renally dose medications and continue to trend Cr while admitted  10/3   BP and kidney functions Improving, continue to monitor kidney functions        Recent Labs   Lab 10/04/24  0837 10/04/24  1818 10/05/24  0219   BUN 26* 23 21  "  CREATININE 0.7 0.8 0.6         I & O      Intake/Output Summary (Last 24 hours) at 10/5/2024 0840  Last data filed at 10/5/2024 0527      Gross per 24 hour   Intake 480 ml   Output 1150 ml   Net -670 ml         UTI (urinary tract infection)  UC from 9/29 with pseudomonas.  IV meropenem started   10/5 completed meropenem today.      Permanent atrial fibrillation  -Pt. In paced rhythm on admit, continue to monitor on telemetry. Continue home eliquis for AC     Essential hypertension  -Pt. BP low-normal with TACOS on presentation. Hold home BP meds for now and restart GDMT meds as tolerated.  10/3 BP improved to 140s     Type 2 diabetes mellitus  -A1c 4.9 and has been persistently in normal range.  Hold metformin, consider stopping at discharge given new renal dysfunction with normal A1c         Final Active Diagnoses:    Diagnosis Date Noted POA    PRINCIPAL PROBLEM:  Chronic heart failure with preserved ejection fraction [I50.32] 11/12/2020 Yes    Anemia [D64.9] 10/03/2024 Yes    Chronic respiratory failure with hypoxia [J96.11] 09/24/2024 Yes    TACOS (acute kidney injury) [N17.9] 11/26/2019 Yes    UTI (urinary tract infection) [N39.0] 05/29/2017 Yes    Permanent atrial fibrillation [I48.21] 07/08/2016 Yes    Essential hypertension [I10] 04/08/2016 Yes    Type 2 diabetes mellitus [E11.9] 11/24/2014 Yes      Problems Resolved During this Admission:       Discharged Condition: fair    Disposition: home health     Follow Up:    Patient Instructions:      WALKER FOR HOME USE     Order Specific Question Answer Comments   Type of Walker: Adult (5'4"-6'6")    With wheels? No    Height: 5' 4" (1.626 m)    Weight: 95.2 kg (209 lb 14.1 oz)    Length of need (1-99 months): 99    Does patient have medical equipment at home? bedside commode    Does patient have medical equipment at home? cane, straight    Does patient have medical equipment at home? oxygen    Does patient have medical equipment at home? rollator    Does patient " have medical equipment at home? wheelchair    Does patient have medical equipment at home? cane, quad    Please check all that apply: Patient's condition impairs ambulation.        Significant Diagnostic Studies: Labs: BMP:   Recent Labs   Lab 10/04/24  0837 10/04/24  1818 10/05/24  0219   * 155* 114*    137 140   K 4.6 3.8 3.6   CL 95 91* 92*   CO2 35* 38* 37*   BUN 26* 23 21   CREATININE 0.7 0.8 0.6   CALCIUM 9.6 9.3 9.0   MG 1.9  --  1.7   , CMP   Recent Labs   Lab 10/04/24  0837 10/04/24  1818 10/05/24  0219    137 140   K 4.6 3.8 3.6   CL 95 91* 92*   CO2 35* 38* 37*   * 155* 114*   BUN 26* 23 21   CREATININE 0.7 0.8 0.6   CALCIUM 9.6 9.3 9.0   PROT 7.0  --  6.1   ALBUMIN 2.9* 2.9* 2.7*   BILITOT 0.7  --  0.5   ALKPHOS 51*  --  44*   AST 42*  --  36   ALT 45*  --  38   ANIONGAP 9 8 11   , CBC   Recent Labs   Lab 10/04/24  0837 10/05/24  0219   WBC 7.40 6.93   HGB 10.1* 9.7*   HCT 32.1* 30.0*    300   , INR   Lab Results   Component Value Date    INR 1.1 05/22/2024    INR 1.1 04/26/2024    INR 1.2 09/30/2022   , Lipid Panel   Lab Results   Component Value Date    CHOL 149 03/22/2023    HDL 63 03/22/2023    LDLCALC 62.4 (L) 03/22/2023    TRIG 118 03/22/2023    CHOLHDL 42.3 03/22/2023   , Troponin   Recent Labs   Lab 09/29/24  1539 09/30/24  0620   TROPONINI 0.042* 0.024   , A1C:   Recent Labs   Lab 09/25/24  0312   HGBA1C 4.9   , and All labs within the past 24 hours have been reviewed  Microbiology: Blood Culture   Lab Results   Component Value Date    LABBLOO No growth after 5 days. 12/04/2021   , Sputum Culture   Lab Results   Component Value Date    GSRESP <10 epithelial cells per low power field. 10/20/2020    GSRESP Rare WBC's 10/20/2020    GSRESP Moderate Gram positive cocci 10/20/2020    GSRESP Moderate Gram positive rods 10/20/2020    GSRESP Moderate Gram negative rods 10/20/2020    GSRESP Moderate Gram negative diplococci 10/20/2020    RESPIRATORYC No S aureus or  Pseudomonas isolated. 10/20/2020    RESPIRATORYC (A) 10/20/2020     MORAXELLA (BRANHAMELLA) CATARRHALIS  Many  Beta Lactamase positive  Normal respiratory manuel also present     , and Urine Culture    Lab Results   Component Value Date    LABURIN  09/30/2024     Multiple organisms isolated. None in predominance.  Repeat if    LABURIN clinically necessary. 09/30/2024       X-Ray Chest 1 View  Narrative: EXAMINATION:  XR CHEST 1 VIEW    CLINICAL HISTORY:  SOB;    TECHNIQUE:  Single frontal view of the chest was performed.    COMPARISON:  10/01/2024    FINDINGS:  Persistent increased size of the cardiac silhouette with a biventricular pacemaker.  Similar mild prominence of interstitial markings within both lungs with low lung volumes.  There is no blunting of costophrenic angles.  Impression: Stable findings.    Electronically signed by: Isma Mcintyre  Date:    10/04/2024  Time:    10:02       Pending Diagnostic Studies:       Procedure Component Value Units Date/Time    US Upper Extremity Veins Left [[id number]] Resulted: 10/05/24 0842    Order Status: Sent Lab Status: In process Updated: 10/05/24 0842           Medications:  Reconciled Home Medications:      Medication List        START taking these medications      gabapentin 300 MG capsule  Commonly known as: NEURONTIN  Take 1 capsule (300 mg total) by mouth every evening.  Replaces: gabapentin 600 MG tablet            CONTINUE taking these medications      albuterol-ipratropium 2.5 mg-0.5 mg/3 mL nebulizer solution  Commonly known as: DUO-NEB  Take 3 mLs by nebulization every 4 (four) hours as needed for Wheezing or Shortness of Breath.     apixaban 5 mg Tab  Commonly known as: ELIQUIS  Take 1 tablet (5 mg total) by mouth 2 (two) times daily.     ascorbic acid (vitamin C) 500 MG tablet  Commonly known as: VITAMIN C  Take 1,000 mg by mouth once daily.     atorvastatin 10 MG tablet  Commonly known as: LIPITOR  Take 1 tablet (10 mg total) by mouth once daily.      CALTRATE 600 + D ORAL  Take 1 tablet by mouth once daily.     carvediloL 6.25 MG tablet  Commonly known as: COREG  Take 1 tablet (6.25 mg total) by mouth 2 (two) times daily with meals.     clopidogreL 75 mg tablet  Commonly known as: PLAVIX  Take 1 tablet (75 mg total) by mouth once daily.     DULoxetine 20 MG capsule  Commonly known as: CYMBALTA  Take 1 capsule (20 mg total) by mouth once daily.     estradioL 0.01 % (0.1 mg/gram) vaginal cream  Commonly known as: ESTRACE  Place vaginally.     fexofenadine 60 MG tablet  Commonly known as: ALLEGRA  Take 60 mg by mouth once daily.     furosemide 40 MG tablet  Commonly known as: LASIX  Take 1 tablet (40 mg total) by mouth 2 (two) times a day.     isosorbide mononitrate 60 MG 24 hr tablet  Commonly known as: IMDUR  Take 1 tablet (60 mg total) by mouth once daily.     LORazepam 0.5 MG tablet  Commonly known as: ATIVAN  Take 1 tablet (0.5 mg total) by mouth every morning.     metFORMIN 500 MG tablet  Commonly known as: GLUCOPHAGE  Take 1 tablet (500 mg total) by mouth 2 (two) times daily.     montelukast 10 mg tablet  Commonly known as: SINGULAIR  Take 10 mg by mouth once daily.     pantoprazole 40 MG tablet  Commonly known as: PROTONIX  Take 1 tablet (40 mg total) by mouth once daily.     ranolazine 1,000 mg Tb12  Commonly known as: RANEXA  Take 1 tablet (1,000 mg total) by mouth 2 (two) times daily.            STOP taking these medications      acetaminophen 650 MG Tbsr  Commonly known as: TYLENOL     amoxicillin-clavulanate 875-125mg 875-125 mg per tablet  Commonly known as: AUGMENTIN     gabapentin 600 MG tablet  Commonly known as: NEURONTIN  Replaced by: gabapentin 300 MG capsule     HYDROcodone-acetaminophen 5-325 mg per tablet  Commonly known as: NORCO     INV losartan 50 MG Tab  Commonly known as: COZAAR              Indwelling Lines/Drains at time of discharge:   Lines/Drains/Airways       Drain  Duration             Female External Urinary Catheter w/ Suction  09/29/24 1734 4 days                 45 minutes of time spent on discharge, including examining the patient, providing discharge instructions, arranging   follow up and documentation        Cody Napoles MD  Attending Staff Physician  [facility] Medicine  pager- [phone] [id number] - [phone]

## 2024-10-04 NOTE — PLAN OF CARE
Alert and oriented, no s/s of distress, assisted to BSC, back in bed, comfort measures provided, plan of care explained to patient and daughter, call light within reach, will continue to monitor.    Problem: Adult Inpatient Plan of Care  Goal: Plan of Care Review  Outcome: Progressing  Goal: Absence of Hospital-Acquired Illness or Injury  Outcome: Progressing  Goal: Optimal Comfort and Wellbeing  Outcome: Progressing  Goal: Readiness for Transition of Care  Outcome: Progressing     Problem: Acute Kidney Injury/Impairment  Goal: Fluid and Electrolyte Balance  Outcome: Progressing     Problem: Wound  Goal: Optimal Coping  Outcome: Progressing     Problem: Fall Injury Risk  Goal: Absence of Fall and Fall-Related Injury  Outcome: Progressing

## 2024-10-05 VITALS
RESPIRATION RATE: 20 BRPM | DIASTOLIC BLOOD PRESSURE: 67 MMHG | HEIGHT: 64 IN | TEMPERATURE: 98 F | HEART RATE: 59 BPM | BODY MASS INDEX: 35.83 KG/M2 | WEIGHT: 209.88 LBS | SYSTOLIC BLOOD PRESSURE: 148 MMHG | OXYGEN SATURATION: 97 %

## 2024-10-05 LAB
ALBUMIN SERPL BCP-MCNC: 2.7 G/DL (ref 3.5–5.2)
ALP SERPL-CCNC: 44 U/L (ref 55–135)
ALT SERPL W/O P-5'-P-CCNC: 38 U/L (ref 10–44)
ANION GAP SERPL CALC-SCNC: 11 MMOL/L (ref 8–16)
AST SERPL-CCNC: 36 U/L (ref 10–40)
BASOPHILS # BLD AUTO: 0.05 K/UL (ref 0–0.2)
BASOPHILS NFR BLD: 0.7 % (ref 0–1.9)
BILIRUB SERPL-MCNC: 0.5 MG/DL (ref 0.1–1)
BUN SERPL-MCNC: 21 MG/DL (ref 8–23)
CALCIUM SERPL-MCNC: 9 MG/DL (ref 8.7–10.5)
CHLORIDE SERPL-SCNC: 92 MMOL/L (ref 95–110)
CO2 SERPL-SCNC: 37 MMOL/L (ref 23–29)
CREAT SERPL-MCNC: 0.6 MG/DL (ref 0.5–1.4)
DIFFERENTIAL METHOD BLD: ABNORMAL
EOSINOPHIL # BLD AUTO: 0.1 K/UL (ref 0–0.5)
EOSINOPHIL NFR BLD: 0.7 % (ref 0–8)
ERYTHROCYTE [DISTWIDTH] IN BLOOD BY AUTOMATED COUNT: 15.9 % (ref 11.5–14.5)
EST. GFR  (NO RACE VARIABLE): >60 ML/MIN/1.73 M^2
GLUCOSE SERPL-MCNC: 114 MG/DL (ref 70–110)
HCT VFR BLD AUTO: 30 % (ref 37–48.5)
HGB BLD-MCNC: 9.7 G/DL (ref 12–16)
IMM GRANULOCYTES # BLD AUTO: 0.03 K/UL (ref 0–0.04)
IMM GRANULOCYTES NFR BLD AUTO: 0.4 % (ref 0–0.5)
LYMPHOCYTES # BLD AUTO: 1.5 K/UL (ref 1–4.8)
LYMPHOCYTES NFR BLD: 21.6 % (ref 18–48)
MAGNESIUM SERPL-MCNC: 1.7 MG/DL (ref 1.6–2.6)
MCH RBC QN AUTO: 29.2 PG (ref 27–31)
MCHC RBC AUTO-ENTMCNC: 32.3 G/DL (ref 32–36)
MCV RBC AUTO: 90 FL (ref 82–98)
MONOCYTES # BLD AUTO: 1 K/UL (ref 0.3–1)
MONOCYTES NFR BLD: 14.3 % (ref 4–15)
NEUTROPHILS # BLD AUTO: 4.3 K/UL (ref 1.8–7.7)
NEUTROPHILS NFR BLD: 62.3 % (ref 38–73)
NRBC BLD-RTO: 0 /100 WBC
PHOSPHATE SERPL-MCNC: 2.7 MG/DL (ref 2.7–4.5)
PLATELET # BLD AUTO: 300 K/UL (ref 150–450)
PMV BLD AUTO: 10.2 FL (ref 9.2–12.9)
POTASSIUM SERPL-SCNC: 3.6 MMOL/L (ref 3.5–5.1)
PROT SERPL-MCNC: 6.1 G/DL (ref 6–8.4)
RBC # BLD AUTO: 3.32 M/UL (ref 4–5.4)
SODIUM SERPL-SCNC: 140 MMOL/L (ref 136–145)
WBC # BLD AUTO: 6.93 K/UL (ref 3.9–12.7)

## 2024-10-05 PROCEDURE — 25000242 PHARM REV CODE 250 ALT 637 W/ HCPCS: Performed by: NURSE PRACTITIONER

## 2024-10-05 PROCEDURE — 94640 AIRWAY INHALATION TREATMENT: CPT

## 2024-10-05 PROCEDURE — 25000003 PHARM REV CODE 250: Performed by: HOSPITALIST

## 2024-10-05 PROCEDURE — 27000221 HC OXYGEN, UP TO 24 HOURS

## 2024-10-05 PROCEDURE — 63600175 PHARM REV CODE 636 W HCPCS: Performed by: HOSPITALIST

## 2024-10-05 PROCEDURE — 63600175 PHARM REV CODE 636 W HCPCS: Performed by: INTERNAL MEDICINE

## 2024-10-05 PROCEDURE — 94761 N-INVAS EAR/PLS OXIMETRY MLT: CPT

## 2024-10-05 PROCEDURE — 97530 THERAPEUTIC ACTIVITIES: CPT | Mod: CQ

## 2024-10-05 PROCEDURE — 83735 ASSAY OF MAGNESIUM: CPT | Performed by: HOSPITALIST

## 2024-10-05 PROCEDURE — 97116 GAIT TRAINING THERAPY: CPT | Mod: CQ

## 2024-10-05 PROCEDURE — 80053 COMPREHEN METABOLIC PANEL: CPT | Performed by: HOSPITALIST

## 2024-10-05 PROCEDURE — 36415 COLL VENOUS BLD VENIPUNCTURE: CPT | Performed by: HOSPITALIST

## 2024-10-05 PROCEDURE — 99900035 HC TECH TIME PER 15 MIN (STAT)

## 2024-10-05 PROCEDURE — 85025 COMPLETE CBC W/AUTO DIFF WBC: CPT | Performed by: HOSPITALIST

## 2024-10-05 PROCEDURE — 84100 ASSAY OF PHOSPHORUS: CPT | Performed by: HOSPITALIST

## 2024-10-05 PROCEDURE — 25000003 PHARM REV CODE 250: Performed by: INTERNAL MEDICINE

## 2024-10-05 RX ADMIN — IPRATROPIUM BROMIDE AND ALBUTEROL SULFATE 3 ML: 2.5; .5 SOLUTION RESPIRATORY (INHALATION) at 07:10

## 2024-10-05 RX ADMIN — RANOLAZINE 500 MG: 500 TABLET, EXTENDED RELEASE ORAL at 10:10

## 2024-10-05 RX ADMIN — MEROPENEM 1 G: 1 INJECTION INTRAVENOUS at 10:10

## 2024-10-05 RX ADMIN — MONTELUKAST 10 MG: 10 TABLET, FILM COATED ORAL at 10:10

## 2024-10-05 RX ADMIN — DULOXETINE HYDROCHLORIDE 20 MG: 20 CAPSULE, DELAYED RELEASE ORAL at 10:10

## 2024-10-05 RX ADMIN — CLOPIDOGREL BISULFATE 75 MG: 75 TABLET ORAL at 10:10

## 2024-10-05 RX ADMIN — IPRATROPIUM BROMIDE AND ALBUTEROL SULFATE 3 ML: 2.5; .5 SOLUTION RESPIRATORY (INHALATION) at 11:10

## 2024-10-05 RX ADMIN — FUROSEMIDE 80 MG: 10 INJECTION, SOLUTION INTRAVENOUS at 10:10

## 2024-10-05 RX ADMIN — PANTOPRAZOLE SODIUM 40 MG: 40 TABLET, DELAYED RELEASE ORAL at 10:10

## 2024-10-05 RX ADMIN — ATORVASTATIN CALCIUM 10 MG: 10 TABLET, FILM COATED ORAL at 10:10

## 2024-10-05 RX ADMIN — ACETAMINOPHEN 650 MG: 325 TABLET ORAL at 01:10

## 2024-10-05 RX ADMIN — CARVEDILOL 6.25 MG: 6.25 TABLET, FILM COATED ORAL at 10:10

## 2024-10-05 RX ADMIN — APIXABAN 5 MG: 5 TABLET, FILM COATED ORAL at 10:10

## 2024-10-05 RX ADMIN — MEROPENEM 1 G: 1 INJECTION INTRAVENOUS at 12:10

## 2024-10-05 NOTE — PHYSICIAN QUERY
Provider, due to documentation conflict please   Clarify The Type and Acuity of Heart Failure        Provider Query Response:  Acute on chronic diastolic heart failure (HFpEF)

## 2024-10-05 NOTE — PHYSICIAN QUERY
Provider,  Due to the conflicting clinical picture, please clinically validate the diagnosis of      Acute Hypoxic Resp Failure      . If validated, please provide additional clinical support for the diagnosis.    Provider Query Response   The respiratory condition has been ruled out, Chronic Hypoxic Respiratory Failure Ruled In

## 2024-10-05 NOTE — PT/OT/SLP PROGRESS
Physical Therapy Treatment    Patient Name:  Adriana Strong   MRN:  [id number]    Recommendations:     Discharge Recommendations: Moderate Intensity Therapy  Discharge Equipment Recommendations: walker, rolling  Barriers to discharge: None    Assessment:     Adriana Strong is a 79 y.o. female admitted with a medical diagnosis of Chronic heart failure with preserved ejection fraction.  She presents with the following impairments/functional limitations: weakness, impaired endurance, impaired functional mobility, gait instability, impaired balance, impaired cardiopulmonary response to activity, decreased safety awareness.  Pt was agreeable and tolerated session fairly well. Pt continues to be most limited by SOB and generalized weakness, but tolerated increasing total gait distance. Pt is progressing towards goals and continues to demonstrate the need for moderate intensity therapy on a daily basis post acute exhibited by decreased independence with functional mobility.    Rehab Prognosis: Good; patient would benefit from acute skilled PT services to address these deficits and reach maximum level of function.    Recent Surgery: * No surgery found *      Plan:     During this hospitalization, patient to be seen 4 x/week to address the identified rehab impairments via gait training, therapeutic activities, therapeutic exercises, neuromuscular re-education and progress toward the following goals:    Plan of Care Expires:  10/30/24    Subjective     Chief Complaint: Sob with activity  Patient/Family Comments/goals: pt reported needing to use the commode first   Pain/Comfort:  Pain Rating 1: 0/10      Objective:     Communicated with nurse prior to session.  Patient found up in chair with telemetry, oxygen, PureWick upon PT entry to room.     General Precautions: Standard, fall  Orthopedic Precautions: N/A  Braces: N/A  Respiratory Status: Nasal cannula, flow 2 L/min     Functional Mobility:  Additional staff present:  N/A  Bed Mobility:   Not assessed, pt found and returned to bedside chair  Transfers:   Sit <> Stand Transfer: contact guard assistance and minimum assistance with rolling walker   Commode <> Chair Transfer: minimum assistance with no assistive device using Step Transfer technique (~3-4 steps)   Gait:  Pt ambulated ~12 ft + 3-4 steps with contact guard assistance and rolling walker.  1 seated rest break and no LOB  All lines remained intact throughout ambulation trial, gait belt utilized.  Bedside chair following and oxygen towed  Gait Deviation(s): very slow, mostly steady gait with decrease dany, flexed posture, increase UE usage, and downward gaze   Verbal/tactile cues for pacing as need, keep RW close, and more upright posture.      AM-PAC 6 CLICK MOBILITY  Turning over in bed (including adjusting bedclothes, sheets and blankets)?: 3  Sitting down on and standing up from a chair with arms (e.g., wheelchair, bedside commode, etc.): 3  Moving from lying on back to sitting on the side of the bed?: 3  Moving to and from a bed to a chair (including a wheelchair)?: 3  Need to walk in hospital room?: 3  Climbing 3-5 steps with a railing?: 2  Basic Mobility Total Score: 17       Treatment & Education:  Seated BLE therex x15 reps: ankle pumps and long arc quads  Patient educated on role of therapy, goals of session, and benefits of out of bed mobility.   Instructed on use of call button and importance of calling nursing staff for assistance with mobility   Questions/concerns addressed within PTA scope of practice  Pt verbalized understanding.  Whiteboard Updated      Patient left up in chair with all lines intact, call button in reach, nurse notified, and family present..    GOALS:   Multidisciplinary Problems       Physical Therapy Goals          Problem: Physical Therapy    Goal Priority Disciplines Outcome Interventions   Physical Therapy Goal     PT, PT/OT Progressing    Description: Goals to be met by: 10/30/24      Patient will increase functional independence with mobility by performin. Supine to sit with Moderate Assistance  2. Sit to stand transfer with Moderate Assistance  3. Bed to chair transfer with Moderate Assistance using LRAD  4. Gait  x 50 feet with Moderate Assistance using LRAD.   5. Sitting at edge of bed x10 minutes with Stand-by Assistance  6. Stand for 5 minutes with Moderate Assistance using LRAD                         Time Tracking:     PT Received On: 10/05/24  PT Start Time: 1247     PT Stop Time: 1315  PT Total Time (min): 28 min     Billable Minutes: Gait Training 11 and Therapeutic Activity 17    Treatment Type: Treatment  PT/PTA: PTA     Number of PTA visits since last PT visit: 2     10/05/2024

## 2024-10-05 NOTE — PLAN OF CARE
10/05/24 1515   Post-Acute Status   Post-Acute Authorization Home Health   Home Health Status Referrals Sent  (Current with [facility])   Coverage [facility] Medicare   Discharge Plan   Discharge Plan A Home Health   Discharge Plan B Home with family     Home Health orders sent via Careport to [facility] as pt. is established with this home care agency.     Discharge Plan A and Plan B have been determined by review of patient's clinical status, future medical and therapeutic needs, and coverage/benefits for post-acute care in coordination with multidisciplinary team members.     Martinez Thompson LMSW

## 2024-10-05 NOTE — ASSESSMENT & PLAN NOTE
Patient's with Normocytic anemia.. Hemoglobin stable. Etiology likely due to chronic disease .  Current CBC reviewed-    Recent Labs   Lab 10/03/24  0249 10/04/24  0837 10/05/24  0219   HGB 9.2* 10.1* 9.7*         Component Value Date/Time    MCV 90 10/05/2024 0219    RDW 15.9 (H) 10/05/2024 0219    IRON 39 09/30/2022 1324    FERRITIN 81 09/30/2022 1324    FOLATE >24.0 08/24/2016 0723    [id number] 846 02/19/2019 1435     Monitor CBC and transfuse if H/H drops below 7/21.

## 2024-10-05 NOTE — PROGRESS NOTES
[facility] - Cardiology [facility] Medicine  Progress Note    Patient Name: Adriana Strong  MRN: [id number]  Patient Class: IP- Inpatient   Admission Date: 9/29/2024  Length of Stay: 4 days  Attending Physician: Cody Napoles MD  Primary Care Provider: Krzysztof Mcgraw MD        Subjective:     Principal Problem:Chronic heart failure with preserved ejection fraction        HPI:  78 yo F with PMHx HF with recovered EF, hx of TAVR, Afib on elqiuis, HLD, HTN, and COPD on home 2L O2 who presents to the ED complaining of weakness/generalized malaise since her discharge yesterday. Pt. Was just admitted to this facility 9/24-9/28 for for decompensated heart failure. She was diuresed for 5 days with IV lasix, and she reports that she was feeeling well at discharge yesterday. Today, however, she woke up with lightheadedness and agtigue. She felt very weak when trying to just get out of bed and move around her house. She noted her BP was lower than usual (reported to be 80's/50's, so she came back to the ED for further evaluation. She denies any fevers, chills, SOB, chest pain, or palpitations.    Overview/Hospital Course:  Patient was readmitted for hypotension with TACOS, she received IV fluids and subsequently had significant pulmonary edema.  She was diuresed and Nephrology was consulted.  Volume status improving    10/3 hypotension with TACOS, she received IV fluids and subsequently had significant pulmonary edema.  diuresed and Nephrology was consulted. TACOS resolved- likely from reduced oral intake and  diuresis. Volume status improving. negative balance of  2.2 L  K and Mg replaced. continue lasix 80mg IV q 12h. sats 100% on 2L NC . UC from 9/29 with pseudomonas.  IV meropenem started   10/4 improved oral intake. continue meropenem x 3 days. CX ray -Persistent increased size of the cardiac silhouette with a biventricular pacemaker. Similar mild prominence of interstitial markings within both lungs with low lung  volumes. There is no blunting of costophrenic angles.   10/5 completed meropenem today. discussed with family. discharge with home health         Review of Systems:   Pain scale:   Constitutional:  fever,  chills, headache, vision loss, hearing loss, weight loss, Generalized weakness, falls, loss of smell, loss of taste, poor appetite,  sore throat  Respiratory: cough, shortness of breath.   Cardiovascular: chest pain, dizziness, palpitations, orthopnea, swelling of feet, syncope  Gastrointestinal: nausea, vomiting, abdominal pain, diarrhea, black stool,  blood in stool, change in bowel habits, constipation  Genitourinary: hematuria, dysuria, urgency, frequency  Integument/Breast: rash,  pruritis  Hematologic/Lymphatic: easy bruising, lymphadenopathy  Musculoskeletal: arthralgias , myalgias, back pain, neck pain, knee pain  Neurological: confusion, seizures, tremors, slurred speech  Behavioral/Psych:  depression, anxiety, auditory or visual hallucinations     OBJECTIVE:     Physical Exam:  Body mass index is 36.03 kg/m².    Constitutional: Appears well-developed and well-nourished. severe obesity   Head: Normocephalic and atraumatic.   Neck: Normal range of motion. Neck supple.   Cardiovascular: Normal heart rate.  Regular heart rhythm. + rales   Pulmonary/Chest: Effort normal.   Abdominal: + distension.  No tenderness  Musculoskeletal: Normal range of motion.+  edema.   Neurological: Alert and oriented to person, place, and time.   Skin: Skin is warm and dry.   Psychiatric: Normal mood and affect. Behavior is normal.                  Vital Signs  Temp: 98.3 °F (36.8 °C) (10/05/24 0759)  Pulse: (!) 59 (10/05/24 0801)  Resp: 20 (10/05/24 0759)  BP: (!) 107/57 (10/05/24 0801)  SpO2: 98 % (10/05/24 0759)     24 Hour VS Range    Temp:  [97.9 °F (36.6 °C)-98.7 °F (37.1 °C)]   Pulse:  [59-88]   Resp:  [16-20]   BP: (107-197)/(57-79)   SpO2:  [95 %-98 %]     Intake/Output Summary (Last 24 hours) at 10/5/2024 0842  Last  "data filed at 10/5/2024 0527  Gross per 24 hour   Intake 480 ml   Output 1150 ml   Net -670 ml         I/O This Shift:  No intake/output data recorded.    Wt Readings from Last 3 Encounters:   09/30/24 95.2 kg (209 lb 14.1 oz)   09/28/24 90.6 kg (199 lb 11.8 oz)   09/24/24 95.7 kg (211 lb)       I have personally reviewed the vitals and recorded Intake/Output     Laboratory/Diagnostic Data:    CBC/Anemia Labs: Coags:    Recent Labs   Lab 10/03/24  0249 10/04/24  0837 10/05/24  0219   WBC 7.25 7.40 6.93   HGB 9.2* 10.1* 9.7*   HCT 28.5* 32.1* 30.0*    311 300   MCV 90 93 90   RDW 15.9* 16.0* 15.9*    No results for input(s): "PT", "INR", "APTT" in the last 168 hours.     Chemistries: ABG:   Recent Labs   Lab 10/02/24  0332 10/03/24  0249 10/03/24  1651 10/04/24  0837 10/04/24  1818 10/05/24  0219    136   < > 139 137 140   K 3.8 3.3*   < > 4.6 3.8 3.6   CL 92* 91*   < > 95 91* 92*   CO2 30* 35*   < > 35* 38* 37*   BUN 36* 30*   < > 26* 23 21   CREATININE 0.8 0.7   < > 0.7 0.8 0.6   CALCIUM 9.8 9.0   < > 9.6 9.3 9.0   PROT 6.8  --   --  7.0  --  6.1   BILITOT 0.7  --   --  0.7  --  0.5   ALKPHOS 41*  --   --  51*  --  44*   ALT 43  --   --  45*  --  38   AST 45*  --   --  42*  --  36   MG  --  1.6  --  1.9  --  1.7   PHOS  --   --    < > 3.1 2.4* 2.7    < > = values in this interval not displayed.    Recent Labs   Lab 09/30/24  0941 10/01/24  1016   PH 7.299* 7.443   PCO2 25.7* 54.4*   PO2 41 83   HCO3 12.6* 37.2*   POCSATURATED 72 96   BE -14* 13*        POCT Glucose: HbA1c:    Recent Labs   Lab 09/28/24  1100 09/30/24  0655 09/30/24  1148 09/30/24  1507 10/01/24  0637 10/04/24  1937   POCTGLUCOSE 154* 139* 188* 164* 138* 144*    Hemoglobin A1C   Date Value Ref Range Status   09/25/2024 4.9 4.0 - 5.6 % Final     Comment:     ADA Screening Guidelines:  5.7-6.4%  Consistent with prediabetes  >or=6.5%  Consistent with diabetes    High levels of fetal hemoglobin interfere with the HbA1C  assay. Heterozygous " "hemoglobin variants (HbS, HgC, etc)do  not significantly interfere with this assay.   However, presence of multiple variants may affect accuracy.     02/09/2024 5.0 4.0 - 5.6 % Final     Comment:     ADA Screening Guidelines:  5.7-6.4%  Consistent with prediabetes  >or=6.5%  Consistent with diabetes    High levels of fetal hemoglobin interfere with the HbA1C  assay. Heterozygous hemoglobin variants (HbS, HgC, etc)do  not significantly interfere with this assay.   However, presence of multiple variants may affect accuracy.     09/21/2023 5.3 4.0 - 5.6 % Final     Comment:     ADA Screening Guidelines:  5.7-6.4%  Consistent with prediabetes  >or=6.5%  Consistent with diabetes    High levels of fetal hemoglobin interfere with the HbA1C  assay. Heterozygous hemoglobin variants (HbS, HgC, etc)do  not significantly interfere with this assay.   However, presence of multiple variants may affect accuracy.          Cardiac Enzymes: Ejection Fractions:    No results for input(s): "CPK", "CPKMB", "MB", "TROPONINI" in the last 72 hours. EF   Date Value Ref Range Status   02/10/2024 35 % Final   10/01/2022 55 % Final     Nuc Stress EF   Date Value Ref Range Status   02/14/2024 65 % Final   10/27/2023 67 % Final     Nuc Rest EF   Date Value Ref Range Status   02/14/2024 61  Final   10/27/2023 67  Final          No results for input(s): "COLORU", "APPEARANCEUA", "PHUR", "SPECGRAV", "PROTEINUA", "GLUCUA", "KETONESU", "BILIRUBINUA", "OCCULTUA", "NITRITE", "UROBILINOGEN", "LEUKOCYTESUR", "RBCUA", "WBCUA", "BACTERIA", "SQUAMEPITHEL", "HYALINECASTS" in the last 48 hours.    Invalid input(s): "WRIGHTSUR"    Procalcitonin (ng/mL)   Date Value   09/30/2022 0.06   10/19/2020 0.09   01/02/2017 <0.09     Lactate (Lactic Acid) (mmol/L)   Date Value   09/29/2024 1.1   09/30/2022 2.4 (H)   12/04/2021 2.4 (H)   11/26/2019 2.3 (H)   05/29/2017 1.2     BNP (pg/mL)   Date Value   09/29/2024 458 (H)   09/24/2024 425 (H)   03/14/2024 247 (H) " "  02/29/2024 171 (H)   02/21/2024 331 (H)     CRP (mg/L)   Date Value   04/21/2011 7.9   03/03/2009 7.4     Sed Rate   Date Value   01/21/2016 25 mm/Hr (H)   04/21/2011 29 mm/hr (H)   03/03/2009 25 mm/hr (H)     D-Dimer (mg/L FEU)   Date Value   06/22/2017 0.64 (H)   07/01/2016 0.33     Ferritin (ng/mL)   Date Value   09/30/2022 81   07/01/2016 16 (L)   11/08/2015 20     No results found for: "LDH"  Troponin I (ng/mL)   Date Value   09/30/2024 0.024   09/29/2024 0.042 (H)   09/24/2024 <0.006   02/13/2024 0.011   02/09/2024 <0.006   09/22/2023 <0.006   09/21/2023 <0.006   09/21/2023 <0.006     CPK (U/L)   Date Value   08/11/2022 54   08/25/2020 55   07/01/2016 52   07/01/2016 52   05/07/2015 55   11/24/2014 54   05/07/2011 1010 (H)   05/07/2011 327 (H)     No results found. However, due to the size of the patient record, not all encounters were searched. Please check Results Review for a complete set of results.  SARS-CoV2 (COVID-19) Qualitative PCR (no units)   Date Value   05/14/2020 Not Detected     SARS-CoV-2 RNA, Amplification, Qual (no units)   Date Value   10/29/2020 Negative   10/19/2020 Negative     POC Rapid COVID (no units)   Date Value   09/30/2022 Negative   08/11/2022 Negative   08/17/2021 Negative   11/10/2020 Negative       Microbiology labs for the last week  Microbiology Results (last 7 days)       Procedure Component Value Units Date/Time    Urine culture [[id number]]  (Abnormal)  (Susceptibility) Collected: 09/29/24 8691    Order Status: Completed Specimen: Urine Updated: 10/03/24 1020     Urine Culture, Routine PSEUDOMONAS AERUGINOSA  >100,000 cfu/ml      Narrative:      Add on UUNR to #[id number] per Ruddy Boyd MD on  09/30/2024  03:28     Specimen Source->Urine    Urine culture [[id number]] Collected: 09/30/24 1555    Order Status: Completed Specimen: Urine Updated: 10/01/24 2053     Urine Culture, Routine Multiple organisms isolated. None in predominance.  Repeat if      clinically necessary. "    Narrative:      Specimen Source->Urine            Reviewed and noted in plan where applicable- Please see chart for full lab data.    Lines/Drains:       Peripheral IV - Single Lumen 10/02/24 2342 20 G Anterior;Right Forearm (Active)   Number of days: 0       Female External Urinary Catheter w/ Suction 09/29/24 1734 (Active)   Skin no breakdown;no redness 10/03/24 0000   Tolerance no signs/symptoms of discomfort 10/03/24 0000   Suction Continuous suction at 70 mmHg 10/02/24 2000   Date of last wick change 10/02/24 10/02/24 2000   Time of last wick change 1355 10/02/24 1355   Output (mL) 300 mL 10/01/24 1301   Number of days: 3       Imaging  ECG Results              EKG 12-lead (Final result)        Collection Time Result Time QRS Duration OHS QTC Calculation    09/29/24 15:29:53 09/30/24 14:18:41 94 250                     Final result by Interface, Lab In [facility] (09/30/24 14:18:43)                   Narrative:    Test Reason : R53.1,    Vent. Rate : 133 BPM     Atrial Rate : 058 BPM     P-R Int : 000 ms          QRS Dur : 094 ms      QT Int : 168 ms       P-R-T Axes : 000 092 097 degrees     QTc Int : 250 ms    Marked artifact  Atrial flutter with what is probably ventricular pacing  Rightward axis  Anteroseptal infarct ,age undetermined  Abnormal ECG  When compared with ECG of 24-SEP-2024 17:10,  Poor data quality in current ECG precludes serial comparison  Confirmed by Krzysztof Francisco MD ([id number]) on 9/30/2024 2:18:34 PM    Referred By: [email or web address]   SELF           Confirmed By:Krzysztof Francisco MD                                    Results for orders placed during the hospital encounter of 09/24/24    Echo    Interpretation Summary    Left Ventricle: The left ventricle is normal in size. Normal wall thickness. There is concentric remodeling. There is normal systolic function with a visually estimated ejection fraction of 60 - 65%. Grade III diastolic dysfunction. Elevated left ventricular filling pressure.    Right  Ventricle: Normal right ventricular cavity size. Wall thickness is normal. Systolic function is normal. Pacemaker lead present in the ventricle.    The left atrium is severely dilated.    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 It is reported to be a 26mm Phan Suzi S3 valve. Aortic valve area by VTI is 1.90 cm2. Aortic valve peak velocity is 1.64 m/s. Mean gradient is 6 mmHg. The dimensionless index is 0.57. There is mild aortic regurgitation.    Tricuspid Valve: There is mild regurgitation.    Pulmonary Artery: The estimated pulmonary artery systolic pressure is 48 mmHg.    IVC/SVC: Elevated venous pressure at 15 mmHg.    Pericardium: There is a small posterior effusion. No indication of cardiac tamponade.      X-Ray Chest 1 View  Narrative: EXAMINATION:  XR CHEST 1 VIEW    CLINICAL HISTORY:  SOB;    TECHNIQUE:  Single frontal view of the chest was performed.    COMPARISON:  10/01/2024    FINDINGS:  Persistent increased size of the cardiac silhouette with a biventricular pacemaker.  Similar mild prominence of interstitial markings within both lungs with low lung volumes.  There is no blunting of costophrenic angles.  Impression: Stable findings.    Electronically signed by: Isma Mcintyre  Date:    10/04/2024  Time:    10:02      Labs, Imaging, EKG and Diagnostic results from 10/5/2024 were reviewed.    Medications:  Medication list was reviewed and changes noted under Assessment/Plan.  Current Facility-Administered Medications on File Prior to Encounter   Medication Dose Route Frequency Provider Last Rate Last Admin    0.9%  NaCl infusion   Intravenous Continuous Lynette Hightower NP   New Bag at 05/29/24 1440    sodium chloride 0.9% flush 5 mL  5 mL Intravenous PRN Lynette Hightower NP        vancomycin (VANCOCIN) 1,000 mg in dextrose 5 % (D5W) 250 mL IVPB (Vial-Mate)  1,000 mg Intravenous On Call Procedure Lynette Hightower NP   1,000 mg at 05/29/24 1872      Current Outpatient Medications on File Prior to Encounter   Medication Sig Dispense Refill    albuterol-ipratropium (DUO-NEB) 2.5 mg-0.5 mg/3 mL nebulizer solution Take 3 mLs by nebulization every 4 (four) hours as needed for Wheezing or Shortness of Breath.      apixaban (ELIQUIS) 5 mg Tab Take 1 tablet (5 mg total) by mouth 2 (two) times daily. 180 tablet 3    ascorbic acid, vitamin C, (VITAMIN C) 500 MG tablet Take 1,000 mg by mouth once daily.       atorvastatin (LIPITOR) 10 MG tablet Take 1 tablet (10 mg total) by mouth once daily. 30 tablet 0    calcium carbonate/vitamin D3 (CALTRATE 600 + D ORAL) Take 1 tablet by mouth once daily.      carvediloL (COREG) 6.25 MG tablet Take 1 tablet (6.25 mg total) by mouth 2 (two) times daily with meals. 180 tablet 3    clopidogreL (PLAVIX) 75 mg tablet Take 1 tablet (75 mg total) by mouth once daily. 90 tablet 3    DULoxetine (CYMBALTA) 20 MG capsule Take 1 capsule (20 mg total) by mouth once daily. 30 capsule 0    estradioL (ESTRACE) 0.01 % (0.1 mg/gram) vaginal cream Place vaginally.      fexofenadine (ALLEGRA) 60 MG tablet Take 60 mg by mouth once daily.      furosemide (LASIX) 40 MG tablet Take 1 tablet (40 mg total) by mouth 2 (two) times a day.      isosorbide mononitrate (IMDUR) 60 MG 24 hr tablet Take 1 tablet (60 mg total) by mouth once daily. 30 tablet 0    LORazepam (ATIVAN) 0.5 MG tablet Take 1 tablet (0.5 mg total) by mouth every morning. 30 tablet 2    losartan (COZAAR) 50 MG Tab Take 1 tablet (50 mg total) by mouth once daily. 30 tablet 11    metFORMIN (GLUCOPHAGE) 500 MG tablet Take 1 tablet (500 mg total) by mouth 2 (two) times daily. 60 tablet 0    montelukast (SINGULAIR) 10 mg tablet Take 10 mg by mouth once daily.      pantoprazole (PROTONIX) 40 MG tablet Take 1 tablet (40 mg total) by mouth once daily. 90 tablet 3    ranolazine (RANEXA) 1,000 mg Tb12 Take 1 tablet (1,000 mg total) by mouth 2 (two) times daily. 180 tablet 3     Scheduled  Medications:  Current Facility-Administered Medications   Medication Dose Route Frequency    albuterol-ipratropium  3 mL Nebulization Q4H WAKE    apixaban  5 mg Oral BID    atorvastatin  10 mg Oral Daily    carvediloL  6.25 mg Oral BID WM    clopidogreL  75 mg Oral Daily    DULoxetine  20 mg Oral Daily    furosemide (LASIX) injection  80 mg Intravenous Q12H    gabapentin  300 mg Oral QHS    meropenem IV (PEDS and ADULTS)  1 g Intravenous Q8H    montelukast  10 mg Oral Daily    pantoprazole  40 mg Oral Daily    ranolazine  500 mg Oral BID     PRN:   Current Facility-Administered Medications:     acetaminophen, 650 mg, Oral, Q4H PRN    albuterol-ipratropium, 3 mL, Nebulization, Q4H PRN    dextrose 10%, 12.5 g, Intravenous, PRN    dextrose 10%, 25 g, Intravenous, PRN    glucagon (human recombinant), 1 mg, Intramuscular, PRN    glucose, 16 g, Oral, PRN    glucose, 24 g, Oral, PRN    LORazepam, 0.5 mg, Oral, Q12H PRN    melatonin, 6 mg, Oral, Nightly PRN    naloxone, 0.02 mg, Intravenous, PRN    ondansetron, 4 mg, Intravenous, Q8H PRN    prochlorperazine, 5 mg, Intravenous, Q6H PRN    sodium chloride 0.9%, 10 mL, Intravenous, PRN  Infusions:   Estimated Creatinine Clearance: 82.3 mL/min (based on SCr of 0.6 mg/dL).           Assessment/Plan:      * Chronic heart failure with preserved ejection fraction  -TTE showed normal EF, Grade III DD, TAVR, CVP was elevated at the time  -CXR today shows persistent L sided effusion, mild pulm edema, similar to prior. Pt. Stable on home O2 without respiratory distress  -Gentle hydration given in ED in setting of hypotension/TACOS. Continue to monitor volume status closely and restart lasix when appropriate    9/30  Received 1 L of IV fluids and subsequently had significant clinical pulmonary edema, given Lasix 80 mg IV once and we will reassess how she response.  VBG as she may have some compensated alkalosis given her respiratory distress  10/1  Patient has decompensated diastolic  heart failure with florid pulmonary edema noted on chest x-ray today, she does have respiratory distress on examination today.  ABG done, interpretation of possible mixed base disorder and main  of contraction alkalosis.  Given the complexities re volume status management nephrology and Cardiology were consulted.  Thus far nephrology recommends continue with 80 mg Lasix b.i.d. we will do b.i.d. BMP.  Input output monitoring and fluid restrictions for now.  Patient was previously treated for UTI, prescribed oral Augmentin by provider based on sensitivities.  Continue with Augmentin renally dosed and follow up with urine culture  10/2  Continue with Lasix 80 mg IV b.i.d., input output monitoring fluid restrictions.  Continue Augmentin and follow up with isolate and sensitivities.  Nephrology on board    10/3 hypotension with TACOS, she received IV fluids and subsequently had significant pulmonary edema.  diuresed and Nephrology was consulted. TACOS resolved- likely from reduced oral intake and  diuresis. Volume status improving. negative balance of  2.2 L     Anemia  Patient's with Normocytic anemia.. Hemoglobin stable. Etiology likely due to chronic disease .  Current CBC reviewed-    Recent Labs   Lab 10/03/24  0249 10/04/24  0837 10/05/24  0219   HGB 9.2* 10.1* 9.7*         Component Value Date/Time    MCV 90 10/05/2024 0219    RDW 15.9 (H) 10/05/2024 0219    IRON 39 09/30/2022 1324    FERRITIN 81 09/30/2022 1324    FOLATE >24.0 08/24/2016 0723    [id number] 02/19/2019 1435     Monitor CBC and transfuse if H/H drops below 7/21.      Chronic respiratory failure with hypoxia  -Pt. Stable on home 2 L NC. CXR notes mild pulm edema and L sided pleural effusion, consider CT chest for further work-up if respiratory status worsening.    10/3 sats 100% on 2L NC .     Acute on chronic combined systolic and diastolic congestive heart failure  - on presentation      TACOS (acute kidney injury)  -Cr 1.9 on  presentation, baseline <1, was normal 1 day prior. Reports hypotension at home and noted to have low-normal BP in ED  -Urine studies pending, plan to hold home BP meds and lasix  for tonight and gently hydrate  -Renally dose medications and continue to trend Cr while admitted  10/3   BP and kidney functions Improving, continue to monitor kidney functions  Recent Labs   Lab 10/04/24  0837 10/04/24  1818 10/05/24  0219   BUN 26* 23 21   CREATININE 0.7 0.8 0.6       I & O     Intake/Output Summary (Last 24 hours) at 10/5/2024 0840  Last data filed at 10/5/2024 0527  Gross per 24 hour   Intake 480 ml   Output 1150 ml   Net -670 ml        UTI (urinary tract infection)  UC from 9/29 with pseudomonas.  IV meropenem started   10/5 completed meropenem today.     Permanent atrial fibrillation  -Pt. In paced rhythm on admit, continue to monitor on telemetry. Continue home eliquis for AC    Essential hypertension  -Pt. BP low-normal with TACOS on presentation. Hold home BP meds for now and restart GDMT meds as tolerated.  10/3 BP improved to 140s    Type 2 diabetes mellitus  -A1c 4.9 and has been persistently in normal range.  Hold metformin, consider stopping at discharge given new renal dysfunction with normal A1c      VTE Risk Mitigation (From admission, onward)           Ordered     apixaban tablet 5 mg  2 times daily         10/01/24 0946                    Discharge Planning   MIMI: 10/5/2024     Code Status: Full Code   Is the patient medically ready for discharge?:     Reason for patient still in hospital (select all that apply): Treatment  Discharge Plan A: Home Health                  Cody Napoles MD  Department of Hospital Medicine   [facility] - Cardiology Stepdown

## 2024-10-05 NOTE — ASSESSMENT & PLAN NOTE
-Cr 1.9 on presentation, baseline <1, was normal 1 day prior. Reports hypotension at home and noted to have low-normal BP in ED  -Urine studies pending, plan to hold home BP meds and lasix  for tonight and gently hydrate  -Renally dose medications and continue to trend Cr while admitted  10/3   BP and kidney functions Improving, continue to monitor kidney functions  Recent Labs   Lab 10/04/24  0837 10/04/24  1818 10/05/24  0219   BUN 26* 23 21   CREATININE 0.7 0.8 0.6       I & O     Intake/Output Summary (Last 24 hours) at 10/5/2024 0840  Last data filed at 10/5/2024 0527  Gross per 24 hour   Intake 480 ml   Output 1150 ml   Net -670 ml

## 2024-10-10 ENCOUNTER — PATIENT MESSAGE (OUTPATIENT)
Dept: ELECTROPHYSIOLOGY | Facility: CLINIC | Age: 79
End: 2024-10-10
Payer: MEDICARE

## 2024-10-14 NOTE — PROGRESS NOTES
/Subjective:   Patient ID:  Adriana Strong is a 79 y.o. female who presents for follow up management of No chief complaint on file.    HPI:     October 14, 2024:  Initial visit with me.  Former patient of Dr. Mariee.  Seen by heart failure team as well as EP team.  Recent admission for CHF.  Currently taking Lasix 40 mg b.i.d. and extra p.r.n. she has COPD on home oxygen 2 L.  Accompanied by her  and her daughter.  Very pleasant family.  Since discharge has been okay.  Monitor her weight daily.  Weight has been stable since discharge around 200 lb.    Historically:     COPD on home oxygen 2 L, CAD, HLD, HTN. AS s/p TAVR, DM, CVA, CHF, AF and PPM with upgrade to CRT P in May 2024, history of inferior mesenteric revascularization with a 5 mm bare metal stent.  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            Left heart catheterization February 2024 for abnormal PET stress test     The estimated blood loss was none.    There was non-obstructive coronary artery disease..    False positive PET scan.       [facility] 2019 prior to TAVR:  1.  The LVH made coronary angiography very difficult.  A 10 cc was required for coronary angiograms.  2.  The left main was free of disease.  It was very short making cannulation of the LAD difficulty.  3.  The LAD had a long tubular stenosis of 70% at the diagonal bifurcation.  The diagonal was a larger vessel than the LAD making intervention on the LAD suboptimal.  4.  The left circumflex was normal.  5.  The right coronary artery was normal.        Venous US 9/2022    There is no evidence of a right lower extremity DVT.  The right superficial femoral middle vein is normal.  There is no evidence of a left lower extremity DVT.  The right greater saphenous vein has reflux.  The left greater saphenous vein has reflux.    PET stress 9/2022     Non ischemic with a normal EF    Echo September 2024      Left Ventricle: The left ventricle is  normal in size. Normal wall thickness. There is concentric remodeling. There is normal systolic function with a visually estimated ejection fraction of 60 - 65%. Grade III diastolic dysfunction. Elevated left ventricular filling pressure.    Right Ventricle: Normal right ventricular cavity size. Wall thickness is normal. Systolic function is normal. Pacemaker lead present in the ventricle.    The left atrium is severely dilated.    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 It is reported to be a 26mm Phan Suzi S3 valve. Aortic valve area by VTI is 1.90 cm2. Aortic valve peak velocity is 1.64 m/s. Mean gradient is 6 mmHg. The dimensionless index is 0.57. There is mild aortic regurgitation.    Tricuspid Valve: There is mild regurgitation.    Pulmonary Artery: The estimated pulmonary artery systolic pressure is 48 mmHg.    IVC/SVC: Elevated venous pressure at 15 mmHg.    Pericardium: There is a small posterior effusion. No indication of cardiac tamponade.  Echo 10/2022     Normal EF   Normal TAVR   PASP 47 mmHg        LIZ PTAS with BMS 2015 (5 mm stent) - Dr. Joana Hernandez         Patient Active Problem List    Diagnosis Date Noted    Anemia 10/03/2024    Chronic respiratory failure with hypoxia 09/24/2024    Severe obesity with body mass index (BMI) of 35.0 to 39.9 with comorbidity 02/21/2024    Chest discomfort 09/22/2023    Pacemaker 07/03/2023    Occlusion of superior mesenteric artery 04/05/2023    Acute on chronic combined systolic and diastolic congestive heart failure 09/30/2022    Aortic atherosclerosis 06/25/2021    History of transcatheter aortic valve replacement (TAVR) 12/08/2020    Coronary artery disease involving native coronary artery of native heart without angina pectoris 12/08/2020    Chronic heart failure with preserved ejection fraction 11/12/2020    Localized edema 11/12/2020    CAD (coronary artery disease) 10/30/2020    Asthma in adult 10/30/2020     Acute cystitis with hematuria     Dysuria 10/19/2020    Hyponatremia 2020    Atrial flutter 05/15/2020    Vasovagal syncope 2019    TACOS (acute kidney injury) 2019    Obesity 2019    S/P TAVR (transcatheter aortic valve replacement) 2019    Lumbar radiculopathy 2019    Decreased range of motion of trunk and back 06/10/2019    Lumbar spondylosis 06/10/2019    Neuroforaminal stenosis of lumbar spine 06/10/2019    Spinal stenosis of lumbar region with neurogenic claudication 06/10/2019    Balance problems 2019    DDD (degenerative disc disease), lumbar 2019    Cataract 2019    Diabetic polyneuropathy associated with type 2 diabetes mellitus 2019    Costochondritis 2017    COPD (chronic obstructive pulmonary disease) 2017    UTI (urinary tract infection) 2017    Incisional hernia, without obstruction or gangrene 2017    Aortic stenosis 2017    COPD exacerbation 2017    Old lacunar stroke without late effect 2016     On Eliquis and plavix      Permanent atrial fibrillation 2016    Pure hypercholesterolemia 2016    Essential hypertension 2016    Gastroesophageal reflux disease     Mesenteric artery insufficiency 2015    Chronic mesenteric ischemia 2015    Enlarged ovary 2014    Type 2 diabetes mellitus 2014     A1C 7.0      Severe persistent asthma with acute exacerbation 2014    HLD (hyperlipidemia) 2014    GERD (gastroesophageal reflux disease) 2014           Right Arm BP - Sittin/71  Left Arm BP - Sittin/61        LABS      LAST HbA1c  Lab Results   Component Value Date    HGBA1C 4.9 2024       Lipid panel  Lab Results   Component Value Date    CHOL 149 2023    CHOL 168 2022    CHOL 143 2021     Lab Results   Component Value Date    HDL 63 2023    HDL 69 2022    HDL 56 2021     Lab Results   Component Value  Date    LDLCALC 62.4 (L) 03/22/2023    LDLCALC 87.0 08/11/2022    LDLCALC 69.4 03/16/2021     Lab Results   Component Value Date    TRIG 118 03/22/2023    TRIG 60 08/11/2022    TRIG 88 03/16/2021     Lab Results   Component Value Date    CHOLHDL 42.3 03/22/2023    CHOLHDL 41.1 08/11/2022    CHOLHDL 39.2 03/16/2021            Review of Systems   Constitutional: Positive for malaise/fatigue. Negative for chills and fever.   HENT:  Negative for hearing loss and nosebleeds.    Eyes:  Negative for blurred vision.   Cardiovascular:  Positive for dyspnea on exertion.        As in HPI   Respiratory:  Positive for shortness of breath. Negative for hemoptysis.    Hematologic/Lymphatic: Negative for bleeding problem. Bruises/bleeds easily.   Skin:  Negative for itching.   Musculoskeletal:         Generalized weakness   Gastrointestinal:  Negative for abdominal pain and hematochezia.   Genitourinary:  Negative for hematuria.   Neurological:         As in HPI   Psychiatric/Behavioral:  Negative for altered mental status and depression.        Objective:   Physical Exam  Constitutional:       Comments: Frail   HENT:      Head: Normocephalic and atraumatic.   Cardiovascular:      Rate and Rhythm: Normal rate.      Heart sounds: Murmur (grade 2 systolic) heard.   Pulmonary:      Breath sounds: Rales present.      Comments: Diminished air entry bilaterally.  Basilar crackles  Abdominal:      General: Abdomen is flat.      Palpations: Abdomen is soft.   Musculoskeletal:      Right lower leg: Edema (trace) present.      Left lower leg: Edema (trace) present.   Skin:     Findings: Bruising present.   Neurological:      Mental Status: She is oriented to person, place, and time.   Psychiatric:         Behavior: Behavior normal.         Assessment:     1. Aortic atherosclerosis    2. Aortic valve stenosis, etiology of cardiac valve disease unspecified    3. Chronic heart failure with preserved ejection fraction    4. Essential  hypertension    5. History of transcatheter aortic valve replacement (TAVR)    6. Hyperlipidemia, unspecified hyperlipidemia type    7. S/P TAVR (transcatheter aortic valve replacement)    8. Pacemaker    9. Severe obesity with body mass index (BMI) of 35.0 to 39.9 with comorbidity    10. Type 2 diabetes mellitus with other specified complication, unspecified whether long term insulin use    11. Chronic bronchitis, unspecified chronic bronchitis type    12. Mesenteric artery insufficiency    13. Atrial flutter, unspecified type    14. Coronary artery disease involving native coronary artery of native heart without angina pectoris            Plan:   Post TAVR.  Recent echo with normal bioprosthesis function    Chronic diastolic heart failure.  Continue Lasix 40 mg p.o. b.i.d. and extra p.r.n..  We discussed about p.r.n. indications with 2 lb weight gain in 24 hours or 5 lb weight gain in 1 week    Low-salt diet.  We discussed thoroughly about reading labels.  2 g sodium daily    Fluid restriction to 2 L daily    Continue current BP regimen.  Monitor blood pressure and keep log.  Goal BP less than 130/80    Stable nonobstructive coronary artery disease.  Continue statin, Plavix, Ranexa, Imdur.  May consider switching Plavix to aspirin given significant bruises    Permanent Afib.  Follow up with EP.  Continue Eliquis, and Coreg.     Post CRT-P follow up with EP    Weight loss    2 month follow-up or sooner p.r.n.    Continue atorvastatin.  LDL at goal lower than 70    Continue with current medical plan and lifestyle changes.  Return sooner for concerns or questions. If symptoms persist go to the ED  I have reviewed all pertinent data on this patient       I have reviewed the patient's medical history in detail and updated the computerized patient record.  No orders of the defined types were placed in this encounter.    Follow up as scheduled. Return sooner for concerns or questions            She expressed verbal  understanding and agreed with the plan        -In today's visit, at least 4 established conditions that pose a risk to life or bodily function have been addressed and the conditions are severe.    -In today's visit, monitoring for drug toxicity was accomplished.      Patient's Medications   New Prescriptions    No medications on file   Previous Medications    ALBUTEROL-IPRATROPIUM (DUO-NEB) 2.5 MG-0.5 MG/3 ML NEBULIZER SOLUTION    Take 3 mLs by nebulization every 4 (four) hours as needed for Wheezing or Shortness of Breath.    APIXABAN (ELIQUIS) 5 MG TAB    Take 1 tablet (5 mg total) by mouth 2 (two) times daily.    ASCORBIC ACID, VITAMIN C, (VITAMIN C) 500 MG TABLET    Take 1,000 mg by mouth once daily.     ATORVASTATIN (LIPITOR) 10 MG TABLET    Take 1 tablet (10 mg total) by mouth once daily.    CALCIUM CARBONATE/VITAMIN D3 (CALTRATE 600 + D ORAL)    Take 1 tablet by mouth once daily.    CARVEDILOL (COREG) 6.25 MG TABLET    Take 1 tablet (6.25 mg total) by mouth 2 (two) times daily with meals.    CLOPIDOGREL (PLAVIX) 75 MG TABLET    Take 1 tablet (75 mg total) by mouth once daily.    DULOXETINE (CYMBALTA) 20 MG CAPSULE    Take 1 capsule (20 mg total) by mouth once daily.    ESTRADIOL (ESTRACE) 0.01 % (0.1 MG/GRAM) VAGINAL CREAM    Place vaginally.    FEXOFENADINE (ALLEGRA) 60 MG TABLET    Take 60 mg by mouth once daily.    FUROSEMIDE (LASIX) 40 MG TABLET    Take 1 tablet (40 mg total) by mouth 2 (two) times a day.    GABAPENTIN (NEURONTIN) 300 MG CAPSULE    Take 1 capsule (300 mg total) by mouth every evening.    ISOSORBIDE MONONITRATE (IMDUR) 60 MG 24 HR TABLET    Take 1 tablet (60 mg total) by mouth once daily.    LORAZEPAM (ATIVAN) 0.5 MG TABLET    Take 1 tablet (0.5 mg total) by mouth every morning.    METFORMIN (GLUCOPHAGE) 500 MG TABLET    Take 1 tablet (500 mg total) by mouth 2 (two) times daily.    MONTELUKAST (SINGULAIR) 10 MG TABLET    Take 10 mg by mouth once daily.    PANTOPRAZOLE (PROTONIX) 40 MG  TABLET    Take 1 tablet (40 mg total) by mouth once daily.    RANOLAZINE (RANEXA) 1,000 MG TB12    Take 1 tablet (1,000 mg total) by mouth 2 (two) times daily.   Modified Medications    No medications on file   Discontinued Medications    No medications on file

## 2024-10-15 ENCOUNTER — OFFICE VISIT (OUTPATIENT)
Dept: CARDIOLOGY | Facility: CLINIC | Age: 79
End: 2024-10-15
Payer: MEDICARE

## 2024-10-15 VITALS
SYSTOLIC BLOOD PRESSURE: 115 MMHG | HEIGHT: 64 IN | WEIGHT: 200 LBS | HEART RATE: 60 BPM | OXYGEN SATURATION: 96 % | BODY MASS INDEX: 34.15 KG/M2 | DIASTOLIC BLOOD PRESSURE: 61 MMHG

## 2024-10-15 DIAGNOSIS — E66.01 SEVERE OBESITY WITH BODY MASS INDEX (BMI) OF 35.0 TO 39.9 WITH COMORBIDITY: ICD-10-CM

## 2024-10-15 DIAGNOSIS — E78.5 HYPERLIPIDEMIA, UNSPECIFIED HYPERLIPIDEMIA TYPE: ICD-10-CM

## 2024-10-15 DIAGNOSIS — Z95.0 PACEMAKER: ICD-10-CM

## 2024-10-15 DIAGNOSIS — E11.69 TYPE 2 DIABETES MELLITUS WITH OTHER SPECIFIED COMPLICATION, UNSPECIFIED WHETHER LONG TERM INSULIN USE: ICD-10-CM

## 2024-10-15 DIAGNOSIS — I70.0 AORTIC ATHEROSCLEROSIS: Primary | ICD-10-CM

## 2024-10-15 DIAGNOSIS — K55.1 MESENTERIC ARTERY INSUFFICIENCY: ICD-10-CM

## 2024-10-15 DIAGNOSIS — J42 CHRONIC BRONCHITIS, UNSPECIFIED CHRONIC BRONCHITIS TYPE: ICD-10-CM

## 2024-10-15 DIAGNOSIS — I10 ESSENTIAL HYPERTENSION: ICD-10-CM

## 2024-10-15 DIAGNOSIS — Z95.2 HISTORY OF TRANSCATHETER AORTIC VALVE REPLACEMENT (TAVR): ICD-10-CM

## 2024-10-15 DIAGNOSIS — I50.32 CHRONIC HEART FAILURE WITH PRESERVED EJECTION FRACTION: ICD-10-CM

## 2024-10-15 DIAGNOSIS — I25.10 CORONARY ARTERY DISEASE INVOLVING NATIVE CORONARY ARTERY OF NATIVE HEART WITHOUT ANGINA PECTORIS: ICD-10-CM

## 2024-10-15 DIAGNOSIS — I35.0 AORTIC VALVE STENOSIS, ETIOLOGY OF CARDIAC VALVE DISEASE UNSPECIFIED: ICD-10-CM

## 2024-10-15 DIAGNOSIS — Z95.2 S/P TAVR (TRANSCATHETER AORTIC VALVE REPLACEMENT): ICD-10-CM

## 2024-10-15 DIAGNOSIS — I48.92 ATRIAL FLUTTER, UNSPECIFIED TYPE: ICD-10-CM

## 2024-10-15 PROBLEM — I48.91 ATRIAL FIBRILLATION WITH SLOW VENTRICULAR RESPONSE: Status: RESOLVED | Noted: 2024-03-21 | Resolved: 2024-10-15

## 2024-10-15 PROBLEM — I50.22 CHRONIC SYSTOLIC HEART FAILURE: Status: RESOLVED | Noted: 2024-02-12 | Resolved: 2024-10-15

## 2024-10-15 PROCEDURE — 1111F DSCHRG MED/CURRENT MED MERGE: CPT | Mod: CPTII,S$GLB,, | Performed by: INTERNAL MEDICINE

## 2024-10-15 PROCEDURE — 99999 PR PBB SHADOW E&M-EST. PATIENT-LVL III: CPT | Mod: PBBFAC,,, | Performed by: INTERNAL MEDICINE

## 2024-10-15 PROCEDURE — 1159F MED LIST DOCD IN RCRD: CPT | Mod: CPTII,S$GLB,, | Performed by: INTERNAL MEDICINE

## 2024-10-15 PROCEDURE — 99215 OFFICE O/P EST HI 40 MIN: CPT | Mod: S$GLB,,, | Performed by: INTERNAL MEDICINE

## 2024-10-15 PROCEDURE — 1157F ADVNC CARE PLAN IN RCRD: CPT | Mod: CPTII,S$GLB,, | Performed by: INTERNAL MEDICINE

## 2024-10-15 PROCEDURE — 1101F PT FALLS ASSESS-DOCD LE1/YR: CPT | Mod: CPTII,S$GLB,, | Performed by: INTERNAL MEDICINE

## 2024-10-15 PROCEDURE — 3078F DIAST BP <80 MM HG: CPT | Mod: CPTII,S$GLB,, | Performed by: INTERNAL MEDICINE

## 2024-10-15 PROCEDURE — 3288F FALL RISK ASSESSMENT DOCD: CPT | Mod: CPTII,S$GLB,, | Performed by: INTERNAL MEDICINE

## 2024-10-15 PROCEDURE — 3074F SYST BP LT 130 MM HG: CPT | Mod: CPTII,S$GLB,, | Performed by: INTERNAL MEDICINE

## 2024-10-18 RX ORDER — RANOLAZINE 1000 MG/1
1000 TABLET, EXTENDED RELEASE ORAL 2 TIMES DAILY
Qty: 180 TABLET | Refills: 3 | Status: SHIPPED | OUTPATIENT
Start: 2024-10-18

## 2024-11-06 ENCOUNTER — PATIENT MESSAGE (OUTPATIENT)
Dept: CARDIOLOGY | Facility: CLINIC | Age: 79
End: 2024-11-06
Payer: MEDICARE

## 2024-11-07 ENCOUNTER — TELEPHONE (OUTPATIENT)
Dept: CARDIOLOGY | Facility: CLINIC | Age: 79
End: 2024-11-07
Payer: MEDICARE

## 2024-11-07 ENCOUNTER — PATIENT MESSAGE (OUTPATIENT)
Dept: CARDIOLOGY | Facility: CLINIC | Age: 79
End: 2024-11-07
Payer: MEDICARE

## 2024-11-07 DIAGNOSIS — I50.32 CHRONIC HEART FAILURE WITH PRESERVED EJECTION FRACTION: Primary | ICD-10-CM

## 2024-11-07 NOTE — TELEPHONE ENCOUNTER
Can we get her blood pressure readings, also let us repeat BMP and we will decide based on the blood pressure readings and repeat BMP if we will keep or discontinue the spironolactone.     Sincerely,  Edilberto Avilez MD.   Interventional Cardiologist  [facility], [place]

## 2024-11-07 NOTE — TELEPHONE ENCOUNTER
----- Message from Jose sent at 11/7/2024 10:23 AM CST -----  Contact: pt daughter  Type:  Patient Returning Call    Who Called:pt daughter (Tammy)   Who Left Message for Patient:Aishwarya Cordoba MA  Does the patient know what this is regarding?: yes  Would the patient rather a call back or a response via [facility]? call  Best Call Back Number:[phone]

## 2024-11-07 NOTE — TELEPHONE ENCOUNTER
Have reviewed the blood pressure readings.  Continue spironolactone for time being till we get the blood workup results

## 2024-11-07 NOTE — TELEPHONE ENCOUNTER
Returned Mrs. Strong call, No answer ,I left her V/Message.      Nw                    ----- Message from Ingrid sent at 11/7/2024  9:54 AM CST -----  Regarding: Call back  Contact: [phone]  Who Called: PT     Patient called in requesting to speak with you. Did not specify why just has questions and concerns of her recent hospital visit and medications. Please advise.

## 2024-11-08 ENCOUNTER — LAB VISIT (OUTPATIENT)
Dept: LAB | Facility: HOSPITAL | Age: 79
End: 2024-11-08
Attending: INTERNAL MEDICINE
Payer: MEDICARE

## 2024-11-08 ENCOUNTER — PATIENT MESSAGE (OUTPATIENT)
Dept: CARDIOLOGY | Facility: CLINIC | Age: 79
End: 2024-11-08
Payer: MEDICARE

## 2024-11-08 ENCOUNTER — TELEPHONE (OUTPATIENT)
Dept: CARDIOLOGY | Facility: CLINIC | Age: 79
End: 2024-11-08
Payer: MEDICARE

## 2024-11-08 DIAGNOSIS — I50.32 CHRONIC HEART FAILURE WITH PRESERVED EJECTION FRACTION: ICD-10-CM

## 2024-11-08 DIAGNOSIS — I50.32 CHRONIC HEART FAILURE WITH PRESERVED EJECTION FRACTION: Primary | ICD-10-CM

## 2024-11-08 DIAGNOSIS — Z95.2 S/P TAVR (TRANSCATHETER AORTIC VALVE REPLACEMENT): Primary | ICD-10-CM

## 2024-11-08 LAB
ANION GAP SERPL CALC-SCNC: 7 MMOL/L (ref 8–16)
CALCIUM SERPL-MCNC: 9.1 MG/DL (ref 8.7–10.5)
CHLORIDE SERPL-SCNC: 84 MMOL/L (ref 95–110)
CO2 SERPL-SCNC: 38 MMOL/L (ref 23–29)
CREAT SERPL-MCNC: 0.51 MG/DL (ref 0.5–1.4)
EST. GFR  (NO RACE VARIABLE): >60 ML/MIN/1.73 M^2
GLUCOSE SERPL-MCNC: 175 MG/DL (ref 70–110)
POTASSIUM SERPL-SCNC: 4.7 MMOL/L (ref 3.5–5.1)
SODIUM SERPL-SCNC: 129 MMOL/L (ref 136–145)
UUN UR-MCNC: 23 MG/DL (ref 7–17)

## 2024-11-08 PROCEDURE — 80048 BASIC METABOLIC PNL TOTAL CA: CPT | Mod: PN | Performed by: INTERNAL MEDICINE

## 2024-11-08 PROCEDURE — 36415 COLL VENOUS BLD VENIPUNCTURE: CPT | Mod: PN | Performed by: INTERNAL MEDICINE

## 2024-11-08 RX ORDER — SPIRONOLACTONE 25 MG/1
25 TABLET ORAL DAILY
Qty: 30 TABLET | Refills: 11 | Status: SHIPPED | OUTPATIENT
Start: 2024-11-08 | End: 2025-11-08

## 2024-11-08 NOTE — TELEPHONE ENCOUNTER
I Reached out to the Daughter Tammy and told her I wasn't tying to call her again after we spoke this morning.  I did forward your message to  earlier.    Nw                          ----- Message from Tech Clara sent at 11/8/2024 12:05 PM CST -----  Regarding: Patient call back  .Type: Patient Call Back    Who called:Daughter-Tammy    What is the request in detail:caller states she had a missed call and believes it was from Dr. Avilez's office; nothing in Epic to confirm missed call on 11/08/2024    Can the clinic reply by [facility]?no     Would the patient rather a call back or a response via [facility]? Call     Best call back number:.[phone]      Additional Information:  
daughter

## 2024-11-08 NOTE — PROGRESS NOTES
I tried to call      Please call the patient.  Let her know I have reviewed her blood work.  Sodium is low.  I would like her to hold the Lasix for 3 days and continue spironolactone.  After 3 days start Lasix 40 mg daily and extra as needed.  I would like to repeat blood work in 1 week to monitor her sodium level.    Thanks,

## 2024-11-08 NOTE — TELEPHONE ENCOUNTER
I called and spoke to the daughter, due to the patient couldn't hear well and didn't understood directions.  I also forward  Message below to patient portal so that the Daughter could review for her self.  The Daughter stated she still has more questions regarding the Lasix.  She stated that she will sent another message to .  The Blood work (BMP) has been scheduled for next week.    Patient & Daughter appreciate the call.    Edilberto Garcia MD [place], YESSI Neff      I tried to call      Please call the patient.  Let her know I have reviewed her blood work.  Sodium is low.  I would like her to hold the Lasix for 3 days and continue spironolactone.  After 3 days start Lasix 40 mg daily and extra as needed.  I would like to repeat blood work in 1 week to monitor her sodium level.    Thanks,

## 2024-11-08 NOTE — TELEPHONE ENCOUNTER
I have called Tammy.  We discussed.  We will cut the Lasix to 40 mg daily and extra p.r.n..  We will continue spironolactone.  We will get BMP next week.  please arrange the BMP

## 2024-11-10 ENCOUNTER — HOSPITAL ENCOUNTER (INPATIENT)
Facility: HOSPITAL | Age: 79
LOS: 13 days | Discharge: HOME HEALTH | DRG: 291 | End: 2024-11-23
Attending: EMERGENCY MEDICINE | Admitting: EMERGENCY MEDICINE
Payer: MEDICARE

## 2024-11-10 DIAGNOSIS — R09.89 BILATERAL RALES: ICD-10-CM

## 2024-11-10 DIAGNOSIS — R53.83 FATIGUE: ICD-10-CM

## 2024-11-10 DIAGNOSIS — J81.0 ACUTE PULMONARY EDEMA: Primary | ICD-10-CM

## 2024-11-10 DIAGNOSIS — R60.9 2+ PITTING EDEMA: ICD-10-CM

## 2024-11-10 DIAGNOSIS — R06.02 SHORTNESS OF BREATH: ICD-10-CM

## 2024-11-10 DIAGNOSIS — E87.5 HYPERKALEMIA: ICD-10-CM

## 2024-11-10 DIAGNOSIS — I31.39 PERICARDIAL EFFUSION: ICD-10-CM

## 2024-11-10 DIAGNOSIS — R07.9 CHEST PAIN: ICD-10-CM

## 2024-11-10 DIAGNOSIS — I50.9 CHF EXACERBATION: ICD-10-CM

## 2024-11-10 PROBLEM — J96.21 ACUTE ON CHRONIC RESPIRATORY FAILURE WITH HYPOXIA: Status: ACTIVE | Noted: 2024-09-24

## 2024-11-10 PROBLEM — E83.42 HYPOMAGNESEMIA: Status: ACTIVE | Noted: 2024-11-10

## 2024-11-10 LAB
ALBUMIN SERPL BCP-MCNC: 3.2 G/DL (ref 3.5–5.2)
ALLENS TEST: ABNORMAL
ALLENS TEST: ABNORMAL
ALLENS TEST: NORMAL
ALLENS TEST: NORMAL
ALP SERPL-CCNC: 45 U/L (ref 40–150)
ALT SERPL W/O P-5'-P-CCNC: 15 U/L (ref 10–44)
ANION GAP SERPL CALC-SCNC: 11 MMOL/L (ref 8–16)
ANION GAP SERPL CALC-SCNC: 14 MMOL/L (ref 8–16)
AST SERPL-CCNC: 28 U/L (ref 10–40)
BACTERIA #/AREA URNS AUTO: ABNORMAL /HPF
BASOPHILS # BLD AUTO: 0.04 K/UL (ref 0–0.2)
BASOPHILS NFR BLD: 0.4 % (ref 0–1.9)
BILIRUB SERPL-MCNC: 0.6 MG/DL (ref 0.1–1)
BILIRUB UR QL STRIP: NEGATIVE
BNP SERPL-MCNC: 740 PG/ML (ref 0–99)
BUN SERPL-MCNC: 20 MG/DL (ref 8–23)
BUN SERPL-MCNC: 21 MG/DL (ref 8–23)
CALCIUM SERPL-MCNC: 9.6 MG/DL (ref 8.7–10.5)
CALCIUM SERPL-MCNC: 9.7 MG/DL (ref 8.7–10.5)
CHLORIDE SERPL-SCNC: 86 MMOL/L (ref 95–110)
CHLORIDE SERPL-SCNC: 86 MMOL/L (ref 95–110)
CLARITY UR REFRACT.AUTO: ABNORMAL
CO2 SERPL-SCNC: 28 MMOL/L (ref 23–29)
CO2 SERPL-SCNC: 30 MMOL/L (ref 23–29)
COLOR UR AUTO: YELLOW
CREAT SERPL-MCNC: 0.7 MG/DL (ref 0.5–1.4)
CREAT SERPL-MCNC: 0.8 MG/DL (ref 0.5–1.4)
CREAT UR-MCNC: 26 MG/DL (ref 15–325)
DIFFERENTIAL METHOD BLD: ABNORMAL
EOSINOPHIL # BLD AUTO: 0 K/UL (ref 0–0.5)
EOSINOPHIL NFR BLD: 0 % (ref 0–8)
ERYTHROCYTE [DISTWIDTH] IN BLOOD BY AUTOMATED COUNT: 17.5 % (ref 11.5–14.5)
EST. GFR  (NO RACE VARIABLE): >60 ML/MIN/1.73 M^2
EST. GFR  (NO RACE VARIABLE): >60 ML/MIN/1.73 M^2
ESTIMATED AVG GLUCOSE: 103 MG/DL (ref 68–131)
GLUCOSE SERPL-MCNC: 133 MG/DL (ref 70–110)
GLUCOSE SERPL-MCNC: 157 MG/DL (ref 70–110)
GLUCOSE UR QL STRIP: NEGATIVE
HBA1C MFR BLD: 5.2 % (ref 4–5.6)
HCO3 UR-SCNC: 40.4 MMOL/L (ref 24–28)
HCO3 UR-SCNC: 42.1 MMOL/L (ref 24–28)
HCT VFR BLD AUTO: 31.8 % (ref 37–48.5)
HCT VFR BLD CALC: 33 %PCV (ref 36–54)
HGB BLD-MCNC: 9.8 G/DL (ref 12–16)
HGB UR QL STRIP: NEGATIVE
HYALINE CASTS UR QL AUTO: 9 /LPF
IMM GRANULOCYTES # BLD AUTO: 0.11 K/UL (ref 0–0.04)
IMM GRANULOCYTES NFR BLD AUTO: 1 % (ref 0–0.5)
KETONES UR QL STRIP: NEGATIVE
LDH SERPL L TO P-CCNC: 0.68 MMOL/L (ref 0.5–2.2)
LDH SERPL L TO P-CCNC: 1.96 MMOL/L (ref 0.5–2.2)
LEUKOCYTE ESTERASE UR QL STRIP: ABNORMAL
LYMPHOCYTES # BLD AUTO: 0.7 K/UL (ref 1–4.8)
LYMPHOCYTES NFR BLD: 6.1 % (ref 18–48)
MAGNESIUM SERPL-MCNC: 1.5 MG/DL (ref 1.6–2.6)
MCH RBC QN AUTO: 27.1 PG (ref 27–31)
MCHC RBC AUTO-ENTMCNC: 30.8 G/DL (ref 32–36)
MCV RBC AUTO: 88 FL (ref 82–98)
MICROSCOPIC COMMENT: ABNORMAL
MONOCYTES # BLD AUTO: 1.2 K/UL (ref 0.3–1)
MONOCYTES NFR BLD: 10.7 % (ref 4–15)
NEUTROPHILS # BLD AUTO: 8.9 K/UL (ref 1.8–7.7)
NEUTROPHILS NFR BLD: 81.8 % (ref 38–73)
NITRITE UR QL STRIP: NEGATIVE
NRBC BLD-RTO: 0 /100 WBC
OSMOLALITY SERPL: 280 MOSM/KG (ref 275–295)
OSMOLALITY UR: 307 MOSM/KG (ref 50–1200)
PCO2 BLDA: 78.8 MMHG (ref 35–45)
PCO2 BLDA: 85.1 MMHG (ref 35–45)
PH SMN: 7.3 [PH] (ref 7.35–7.45)
PH SMN: 7.32 [PH] (ref 7.35–7.45)
PH UR STRIP: 7 [PH] (ref 5–8)
PLATELET # BLD AUTO: 353 K/UL (ref 150–450)
PMV BLD AUTO: 9.5 FL (ref 9.2–12.9)
PO2 BLDA: 33 MMHG (ref 40–60)
PO2 BLDA: 94 MMHG (ref 80–100)
POC BE: 14 MMOL/L
POC BE: 16 MMOL/L
POC IONIZED CALCIUM: 1.17 MMOL/L (ref 1.06–1.42)
POC SATURATED O2: 52 % (ref 95–100)
POC SATURATED O2: 96 % (ref 95–100)
POC TCO2: 43 MMOL/L (ref 23–27)
POC TCO2: 45 MMOL/L (ref 24–29)
POCT GLUCOSE: 155 MG/DL (ref 70–110)
POTASSIUM BLD-SCNC: 4.4 MMOL/L (ref 3.5–5.1)
POTASSIUM SERPL-SCNC: 5.2 MMOL/L (ref 3.5–5.1)
POTASSIUM SERPL-SCNC: 5.6 MMOL/L (ref 3.5–5.1)
PROT SERPL-MCNC: 7.6 G/DL (ref 6–8.4)
PROT UR QL STRIP: ABNORMAL
RBC # BLD AUTO: 3.61 M/UL (ref 4–5.4)
RBC #/AREA URNS AUTO: 3 /HPF (ref 0–4)
SAMPLE: ABNORMAL
SAMPLE: ABNORMAL
SAMPLE: NORMAL
SAMPLE: NORMAL
SITE: ABNORMAL
SITE: ABNORMAL
SITE: NORMAL
SITE: NORMAL
SODIUM BLD-SCNC: 127 MMOL/L (ref 136–145)
SODIUM SERPL-SCNC: 127 MMOL/L (ref 136–145)
SODIUM SERPL-SCNC: 127 MMOL/L (ref 136–145)
SODIUM SERPL-SCNC: 128 MMOL/L (ref 136–145)
SODIUM UR-SCNC: 64 MMOL/L (ref 20–250)
SP GR UR STRIP: 1.02 (ref 1–1.03)
SQUAMOUS #/AREA URNS AUTO: 23 /HPF
TROPONIN I SERPL DL<=0.01 NG/ML-MCNC: 0.01 NG/ML (ref 0–0.03)
TSH SERPL DL<=0.005 MIU/L-ACNC: 2.56 UIU/ML (ref 0.4–4)
URN SPEC COLLECT METH UR: ABNORMAL
WBC # BLD AUTO: 10.86 K/UL (ref 3.9–12.7)
WBC #/AREA URNS AUTO: 17 /HPF (ref 0–5)
YEAST UR QL AUTO: ABNORMAL

## 2024-11-10 PROCEDURE — 85014 HEMATOCRIT: CPT

## 2024-11-10 PROCEDURE — 83735 ASSAY OF MAGNESIUM: CPT | Performed by: EMERGENCY MEDICINE

## 2024-11-10 PROCEDURE — 63600175 PHARM REV CODE 636 W HCPCS

## 2024-11-10 PROCEDURE — 12000002 HC ACUTE/MED SURGE SEMI-PRIVATE ROOM

## 2024-11-10 PROCEDURE — 87077 CULTURE AEROBIC IDENTIFY: CPT | Performed by: EMERGENCY MEDICINE

## 2024-11-10 PROCEDURE — 99285 EMERGENCY DEPT VISIT HI MDM: CPT | Mod: 25

## 2024-11-10 PROCEDURE — 84484 ASSAY OF TROPONIN QUANT: CPT | Performed by: EMERGENCY MEDICINE

## 2024-11-10 PROCEDURE — 85025 COMPLETE CBC W/AUTO DIFF WBC: CPT | Performed by: EMERGENCY MEDICINE

## 2024-11-10 PROCEDURE — 83605 ASSAY OF LACTIC ACID: CPT

## 2024-11-10 PROCEDURE — 93010 ELECTROCARDIOGRAM REPORT: CPT | Mod: 76,,, | Performed by: INTERNAL MEDICINE

## 2024-11-10 PROCEDURE — 99900035 HC TECH TIME PER 15 MIN (STAT)

## 2024-11-10 PROCEDURE — 82962 GLUCOSE BLOOD TEST: CPT

## 2024-11-10 PROCEDURE — 25000003 PHARM REV CODE 250

## 2024-11-10 PROCEDURE — 84295 ASSAY OF SERUM SODIUM: CPT

## 2024-11-10 PROCEDURE — 80048 BASIC METABOLIC PNL TOTAL CA: CPT | Mod: XB

## 2024-11-10 PROCEDURE — 83880 ASSAY OF NATRIURETIC PEPTIDE: CPT | Performed by: EMERGENCY MEDICINE

## 2024-11-10 PROCEDURE — 80053 COMPREHEN METABOLIC PANEL: CPT | Performed by: EMERGENCY MEDICINE

## 2024-11-10 PROCEDURE — 84132 ASSAY OF SERUM POTASSIUM: CPT

## 2024-11-10 PROCEDURE — 94761 N-INVAS EAR/PLS OXIMETRY MLT: CPT

## 2024-11-10 PROCEDURE — 27100171 HC OXYGEN HIGH FLOW UP TO 24 HOURS

## 2024-11-10 PROCEDURE — 82803 BLOOD GASES ANY COMBINATION: CPT

## 2024-11-10 PROCEDURE — 83036 HEMOGLOBIN GLYCOSYLATED A1C: CPT

## 2024-11-10 PROCEDURE — 51702 INSERT TEMP BLADDER CATH: CPT

## 2024-11-10 PROCEDURE — 93005 ELECTROCARDIOGRAM TRACING: CPT

## 2024-11-10 PROCEDURE — 51798 US URINE CAPACITY MEASURE: CPT

## 2024-11-10 PROCEDURE — 25000242 PHARM REV CODE 250 ALT 637 W/ HCPCS

## 2024-11-10 PROCEDURE — 84300 ASSAY OF URINE SODIUM: CPT

## 2024-11-10 PROCEDURE — 84443 ASSAY THYROID STIM HORMONE: CPT | Performed by: EMERGENCY MEDICINE

## 2024-11-10 PROCEDURE — 83935 ASSAY OF URINE OSMOLALITY: CPT

## 2024-11-10 PROCEDURE — 94640 AIRWAY INHALATION TREATMENT: CPT

## 2024-11-10 PROCEDURE — 87088 URINE BACTERIA CULTURE: CPT | Performed by: EMERGENCY MEDICINE

## 2024-11-10 PROCEDURE — 82330 ASSAY OF CALCIUM: CPT

## 2024-11-10 PROCEDURE — 81001 URINALYSIS AUTO W/SCOPE: CPT | Performed by: EMERGENCY MEDICINE

## 2024-11-10 PROCEDURE — 96376 TX/PRO/DX INJ SAME DRUG ADON: CPT

## 2024-11-10 PROCEDURE — 82800 BLOOD PH: CPT

## 2024-11-10 PROCEDURE — 82570 ASSAY OF URINE CREATININE: CPT

## 2024-11-10 PROCEDURE — 63600175 PHARM REV CODE 636 W HCPCS: Performed by: EMERGENCY MEDICINE

## 2024-11-10 PROCEDURE — 27000190 HC CPAP FULL FACE MASK W/VALVE

## 2024-11-10 PROCEDURE — 94660 CPAP INITIATION&MGMT: CPT

## 2024-11-10 PROCEDURE — 5A09357 ASSISTANCE WITH RESPIRATORY VENTILATION, LESS THAN 24 CONSECUTIVE HOURS, CONTINUOUS POSITIVE AIRWAY PRESSURE: ICD-10-PCS | Performed by: INTERNAL MEDICINE

## 2024-11-10 PROCEDURE — 87086 URINE CULTURE/COLONY COUNT: CPT | Performed by: EMERGENCY MEDICINE

## 2024-11-10 PROCEDURE — 87186 SC STD MICRODIL/AGAR DIL: CPT | Performed by: EMERGENCY MEDICINE

## 2024-11-10 PROCEDURE — 96374 THER/PROPH/DIAG INJ IV PUSH: CPT

## 2024-11-10 PROCEDURE — 83930 ASSAY OF BLOOD OSMOLALITY: CPT

## 2024-11-10 PROCEDURE — 27000221 HC OXYGEN, UP TO 24 HOURS

## 2024-11-10 PROCEDURE — 25000003 PHARM REV CODE 250: Performed by: NURSE PRACTITIONER

## 2024-11-10 PROCEDURE — 36600 WITHDRAWAL OF ARTERIAL BLOOD: CPT

## 2024-11-10 RX ORDER — ATORVASTATIN CALCIUM 10 MG/1
10 TABLET, FILM COATED ORAL DAILY
Status: DISCONTINUED | OUTPATIENT
Start: 2024-11-10 | End: 2024-11-23 | Stop reason: HOSPADM

## 2024-11-10 RX ORDER — FUROSEMIDE 10 MG/ML
80 INJECTION INTRAMUSCULAR; INTRAVENOUS
Status: COMPLETED | OUTPATIENT
Start: 2024-11-10 | End: 2024-11-10

## 2024-11-10 RX ORDER — MAGNESIUM SULFATE 1 G/100ML
1 INJECTION INTRAVENOUS ONCE
Status: COMPLETED | OUTPATIENT
Start: 2024-11-10 | End: 2024-11-10

## 2024-11-10 RX ORDER — GLUCAGON 1 MG
1 KIT INJECTION
Status: DISCONTINUED | OUTPATIENT
Start: 2024-11-10 | End: 2024-11-23 | Stop reason: HOSPADM

## 2024-11-10 RX ORDER — IBUPROFEN 200 MG
16 TABLET ORAL
Status: DISCONTINUED | OUTPATIENT
Start: 2024-11-10 | End: 2024-11-23 | Stop reason: HOSPADM

## 2024-11-10 RX ORDER — CLOPIDOGREL BISULFATE 75 MG/1
75 TABLET ORAL DAILY
Status: DISCONTINUED | OUTPATIENT
Start: 2024-11-10 | End: 2024-11-11

## 2024-11-10 RX ORDER — FUROSEMIDE 10 MG/ML
80 INJECTION INTRAMUSCULAR; INTRAVENOUS 2 TIMES DAILY
Status: DISCONTINUED | OUTPATIENT
Start: 2024-11-10 | End: 2024-11-17

## 2024-11-10 RX ORDER — PANTOPRAZOLE SODIUM 40 MG/1
40 TABLET, DELAYED RELEASE ORAL DAILY
Status: DISCONTINUED | OUTPATIENT
Start: 2024-11-11 | End: 2024-11-23 | Stop reason: HOSPADM

## 2024-11-10 RX ORDER — ONDANSETRON 4 MG/1
4 TABLET, FILM COATED ORAL EVERY 6 HOURS PRN
Status: DISCONTINUED | OUTPATIENT
Start: 2024-11-10 | End: 2024-11-10

## 2024-11-10 RX ORDER — MONTELUKAST SODIUM 10 MG/1
10 TABLET ORAL DAILY
Status: DISCONTINUED | OUTPATIENT
Start: 2024-11-11 | End: 2024-11-23 | Stop reason: HOSPADM

## 2024-11-10 RX ORDER — ASCORBIC ACID 500 MG
1000 TABLET ORAL DAILY
Status: DISCONTINUED | OUTPATIENT
Start: 2024-11-11 | End: 2024-11-23 | Stop reason: HOSPADM

## 2024-11-10 RX ORDER — ONDANSETRON 8 MG/1
8 TABLET, ORALLY DISINTEGRATING ORAL EVERY 8 HOURS PRN
Status: DISCONTINUED | OUTPATIENT
Start: 2024-11-10 | End: 2024-11-23 | Stop reason: HOSPADM

## 2024-11-10 RX ORDER — DULOXETIN HYDROCHLORIDE 20 MG/1
20 CAPSULE, DELAYED RELEASE ORAL DAILY
Status: DISCONTINUED | OUTPATIENT
Start: 2024-11-10 | End: 2024-11-11

## 2024-11-10 RX ORDER — ISOSORBIDE MONONITRATE 30 MG/1
60 TABLET, EXTENDED RELEASE ORAL DAILY
Status: DISCONTINUED | OUTPATIENT
Start: 2024-11-10 | End: 2024-11-23 | Stop reason: HOSPADM

## 2024-11-10 RX ORDER — ACETAMINOPHEN 325 MG/1
650 TABLET ORAL EVERY 4 HOURS PRN
Status: DISCONTINUED | OUTPATIENT
Start: 2024-11-10 | End: 2024-11-22

## 2024-11-10 RX ORDER — RANOLAZINE 500 MG/1
1000 TABLET, EXTENDED RELEASE ORAL 2 TIMES DAILY
Status: DISCONTINUED | OUTPATIENT
Start: 2024-11-10 | End: 2024-11-23 | Stop reason: HOSPADM

## 2024-11-10 RX ORDER — LORAZEPAM 0.5 MG/1
0.5 TABLET ORAL ONCE
Status: COMPLETED | OUTPATIENT
Start: 2024-11-10 | End: 2024-11-10

## 2024-11-10 RX ORDER — PROCHLORPERAZINE EDISYLATE 5 MG/ML
5 INJECTION INTRAMUSCULAR; INTRAVENOUS EVERY 6 HOURS PRN
Status: DISCONTINUED | OUTPATIENT
Start: 2024-11-10 | End: 2024-11-10

## 2024-11-10 RX ORDER — GABAPENTIN 300 MG/1
300 CAPSULE ORAL 2 TIMES DAILY
Status: DISCONTINUED | OUTPATIENT
Start: 2024-11-10 | End: 2024-11-11

## 2024-11-10 RX ORDER — CEFTRIAXONE 1 G/1
1 INJECTION, POWDER, FOR SOLUTION INTRAMUSCULAR; INTRAVENOUS DAILY
Status: DISCONTINUED | OUTPATIENT
Start: 2024-11-10 | End: 2024-11-11

## 2024-11-10 RX ORDER — PROCHLORPERAZINE EDISYLATE 5 MG/ML
5 INJECTION INTRAMUSCULAR; INTRAVENOUS EVERY 4 HOURS PRN
Status: DISCONTINUED | OUTPATIENT
Start: 2024-11-10 | End: 2024-11-23 | Stop reason: HOSPADM

## 2024-11-10 RX ORDER — IPRATROPIUM BROMIDE AND ALBUTEROL SULFATE 2.5; .5 MG/3ML; MG/3ML
3 SOLUTION RESPIRATORY (INHALATION) EVERY 4 HOURS PRN
Status: DISCONTINUED | OUTPATIENT
Start: 2024-11-10 | End: 2024-11-23 | Stop reason: HOSPADM

## 2024-11-10 RX ORDER — INSULIN ASPART 100 [IU]/ML
0-5 INJECTION, SOLUTION INTRAVENOUS; SUBCUTANEOUS
Status: DISCONTINUED | OUTPATIENT
Start: 2024-11-10 | End: 2024-11-23 | Stop reason: HOSPADM

## 2024-11-10 RX ORDER — SODIUM CHLORIDE 0.9 % (FLUSH) 0.9 %
10 SYRINGE (ML) INJECTION
Status: DISCONTINUED | OUTPATIENT
Start: 2024-11-10 | End: 2024-11-23 | Stop reason: HOSPADM

## 2024-11-10 RX ORDER — IBUPROFEN 200 MG
24 TABLET ORAL
Status: DISCONTINUED | OUTPATIENT
Start: 2024-11-10 | End: 2024-11-23 | Stop reason: HOSPADM

## 2024-11-10 RX ORDER — CARVEDILOL 6.25 MG/1
6.25 TABLET ORAL 2 TIMES DAILY WITH MEALS
Status: DISCONTINUED | OUTPATIENT
Start: 2024-11-10 | End: 2024-11-13

## 2024-11-10 RX ORDER — ACETAMINOPHEN 500 MG
1000 TABLET ORAL EVERY 8 HOURS PRN
Status: DISCONTINUED | OUTPATIENT
Start: 2024-11-10 | End: 2024-11-11

## 2024-11-10 RX ORDER — NALOXONE HCL 0.4 MG/ML
0.02 VIAL (ML) INJECTION
Status: DISCONTINUED | OUTPATIENT
Start: 2024-11-10 | End: 2024-11-23 | Stop reason: HOSPADM

## 2024-11-10 RX ORDER — POLYETHYLENE GLYCOL 3350 17 G/17G
17 POWDER, FOR SOLUTION ORAL DAILY PRN
Status: DISCONTINUED | OUTPATIENT
Start: 2024-11-10 | End: 2024-11-23 | Stop reason: HOSPADM

## 2024-11-10 RX ORDER — SPIRONOLACTONE 25 MG/1
25 TABLET ORAL DAILY
Status: DISCONTINUED | OUTPATIENT
Start: 2024-11-11 | End: 2024-11-23 | Stop reason: HOSPADM

## 2024-11-10 RX ORDER — TALC
6 POWDER (GRAM) TOPICAL NIGHTLY PRN
Status: DISCONTINUED | OUTPATIENT
Start: 2024-11-10 | End: 2024-11-23 | Stop reason: HOSPADM

## 2024-11-10 RX ORDER — FUROSEMIDE 10 MG/ML
120 INJECTION INTRAMUSCULAR; INTRAVENOUS ONCE
Status: COMPLETED | OUTPATIENT
Start: 2024-11-10 | End: 2024-11-11

## 2024-11-10 RX ORDER — SODIUM CHLORIDE 0.9 % (FLUSH) 0.9 %
10 SYRINGE (ML) INJECTION EVERY 12 HOURS PRN
Status: DISCONTINUED | OUTPATIENT
Start: 2024-11-10 | End: 2024-11-23 | Stop reason: HOSPADM

## 2024-11-10 RX ADMIN — CARVEDILOL 6.25 MG: 3.12 TABLET, FILM COATED ORAL at 08:11

## 2024-11-10 RX ADMIN — MAGNESIUM SULFATE 1 G: 500 INJECTION, SOLUTION INTRAMUSCULAR; INTRAVENOUS at 04:11

## 2024-11-10 RX ADMIN — IPRATROPIUM BROMIDE AND ALBUTEROL SULFATE 3 ML: 2.5; .5 SOLUTION RESPIRATORY (INHALATION) at 09:11

## 2024-11-10 RX ADMIN — LORAZEPAM 0.5 MG: 0.5 TABLET ORAL at 10:11

## 2024-11-10 RX ADMIN — GABAPENTIN 300 MG: 300 CAPSULE ORAL at 08:11

## 2024-11-10 RX ADMIN — DULOXETINE HYDROCHLORIDE 20 MG: 20 CAPSULE, DELAYED RELEASE ORAL at 06:11

## 2024-11-10 RX ADMIN — FUROSEMIDE 80 MG: 10 INJECTION, SOLUTION INTRAVENOUS at 01:11

## 2024-11-10 RX ADMIN — ATORVASTATIN CALCIUM 10 MG: 10 TABLET, FILM COATED ORAL at 03:11

## 2024-11-10 RX ADMIN — CEFTRIAXONE 1 G: 1 INJECTION, POWDER, FOR SOLUTION INTRAMUSCULAR; INTRAVENOUS at 03:11

## 2024-11-10 RX ADMIN — CLOPIDOGREL BISULFATE 75 MG: 75 TABLET ORAL at 03:11

## 2024-11-10 RX ADMIN — SODIUM ZIRCONIUM CYCLOSILICATE 5 G: 5 POWDER, FOR SUSPENSION ORAL at 03:11

## 2024-11-10 RX ADMIN — ONDANSETRON 8 MG: 8 TABLET, ORALLY DISINTEGRATING ORAL at 06:11

## 2024-11-10 RX ADMIN — FUROSEMIDE 80 MG: 10 INJECTION, SOLUTION INTRAVENOUS at 12:11

## 2024-11-10 RX ADMIN — FUROSEMIDE 80 MG: 10 INJECTION, SOLUTION INTRAVENOUS at 06:11

## 2024-11-10 RX ADMIN — APIXABAN 5 MG: 5 TABLET, FILM COATED ORAL at 08:11

## 2024-11-10 RX ADMIN — RANOLAZINE 1000 MG: 500 TABLET, EXTENDED RELEASE ORAL at 09:11

## 2024-11-10 RX ADMIN — ISOSORBIDE MONONITRATE 60 MG: 60 TABLET, EXTENDED RELEASE ORAL at 03:11

## 2024-11-10 NOTE — ASSESSMENT & PLAN NOTE
Patient's COPD is controlled currently.  Patient is currently off COPD Pathway. Continue scheduled inhalers Supplemental oxygen and monitor respiratory status closely.   - prn rome

## 2024-11-10 NOTE — ASSESSMENT & PLAN NOTE
Patient has permanent atrial fibrillation. Patient is currently in sinus rhythm. [id number] Score: 5. The patients heart rate in the last 24 hours is as follows:  Pulse  Min: 66  Max: 72     Antiarrhythmics       Anticoagulants  apixaban tablet 5 mg, 2 times daily, Oral    Plan  - Replete lytes with a goal of K>4, Mg >2  - Patient is anticoagulated, see medications listed above.  - Patient's afib is currently controlled

## 2024-11-10 NOTE — ASSESSMENT & PLAN NOTE
Patient has Diastolic (HFpEF) heart failure that is Acute on chronic. On presentation their CHF was decompensated. Evidence of decompensated CHF on presentation includes: edema, elevated JVD, crackles on lung auscultation, weight gain, orthopnea, dyspnea on exertion (WHITLEY), and shortness of breath. The etiology of their decompensation is likely medication adjustments . Patient is on the HF pathway. Most recent BNP and echo results are listed below.  Recent Labs     11/10/24  1200   *     Latest ECHO  Results for orders placed during the hospital encounter of 09/24/24    Echo    Interpretation Summary    Left Ventricle: The left ventricle is normal in size. Normal wall thickness. There is concentric remodeling. There is normal systolic function with a visually estimated ejection fraction of 60 - 65%. Grade III diastolic dysfunction. Elevated left ventricular filling pressure.    Right Ventricle: Normal right ventricular cavity size. Wall thickness is normal. Systolic function is normal. Pacemaker lead present in the ventricle.    The left atrium is severely dilated.    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 It is reported to be a 26mm Phan Suzi S3 valve. Aortic valve area by VTI is 1.90 cm2. Aortic valve peak velocity is 1.64 m/s. Mean gradient is 6 mmHg. The dimensionless index is 0.57. There is mild aortic regurgitation.    Tricuspid Valve: There is mild regurgitation.    Pulmonary Artery: The estimated pulmonary artery systolic pressure is 48 mmHg.    IVC/SVC: Elevated venous pressure at 15 mmHg.    Pericardium: There is a small posterior effusion. No indication of cardiac tamponade.    Current Heart Failure Medications  carvediloL tablet 6.25 mg, 2 times daily with meals, Oral  spironolactone tablet 25 mg, Daily, Oral  furosemide injection 80 mg, 2 times daily, Intravenous    Plan  - Will hold on repeat echo for now as patient just had one done 2mo ago  - Place  on telemetry  - Place on fluid restriction of 1.5 L.   - Cardiology has not been consulted  - The patient's volume status is stable but not at their baseline as indicated by edema, elevated JVD, crackles on lung auscultation, weight gain, dyspnea on exertion (WHITLEY), and shortness of breath  - s/p 80mg IV lasix x2 in ED  - will continue to diurese with 80mg IV lasix BID for now  - can consider initiating entresto given HF and hypertension  - patient requiring increased O2 from home regimen, VBG pending  - strict intake and output, daily standing weights    Intake/Output Summary (Last 24 hours) at 11/10/2024 1442  Last data filed at 11/10/2024 1356  Gross per 24 hour   Intake --   Output 400 ml   Net -400 ml

## 2024-11-10 NOTE — ASSESSMENT & PLAN NOTE
Patients blood pressure range in the last 24 hours was: BP  Min: 158/85  Max: 170/74.The patient's inpatient anti-hypertensive regimen is listed below:  Current Antihypertensives  carvediloL tablet 6.25 mg, 2 times daily with meals, Oral  isosorbide mononitrate 24 hr tablet 60 mg, Daily, Oral  ranolazine SR tablet 1,000 mg, 2 times daily, Oral  spironolactone tablet 25 mg, Daily, Oral  furosemide injection 80 mg, 2 times daily, Intravenous    Plan  - BP is uncontrolled, will adjust as follows: can consider increasing carvedilol if HR can tolerate   - states was previously on losartan and recently taken off, can consider restarting losartan or initiating entresto

## 2024-11-10 NOTE — ASSESSMENT & PLAN NOTE
- patient with reports of dysuria and dark urine noted at bedside  - UA infectious appearing, but with 23 squams  - treat with rocephin for now since patient symptomatic  - follow urine culture

## 2024-11-10 NOTE — ASSESSMENT & PLAN NOTE
Patient has Abnormal Magnesium: hypomagnesemia. Will continue to monitor electrolytes closely. Will replace the affected electrolytes and repeat labs to be done after interventions completed. The patient's magnesium results have been reviewed and are listed below.  Recent Labs   Lab 11/10/24  1200   MG 1.5*

## 2024-11-10 NOTE — ASSESSMENT & PLAN NOTE
Anemia is likely due to Iron deficiency. Most recent hemoglobin and hematocrit are listed below.  Recent Labs     11/10/24  1200   HGB 9.8*   HCT 31.8*     Plan  - Monitor serial CBC: Daily  - Transfuse PRBC if patient becomes hemodynamically unstable, symptomatic or H/H drops below 7/21.  - Patient has not received any PRBC transfusions to date  - Patient's anemia is currently stable

## 2024-11-10 NOTE — ASSESSMENT & PLAN NOTE
The patients most recent potassium results are listed below.  Recent Labs     11/08/24  1015 11/10/24  1200   K 4.7 5.6*     Plan  - Monitor for arrhythmias with EKG and/or continuous telemetry.   - Treat the hyperkalemia with Potassium Binders.   - Monitor potassium: Daily  - The patient's hyperkalemia is stable

## 2024-11-10 NOTE — ASSESSMENT & PLAN NOTE
Patient with Hypoxic Respiratory failure which is Acute on chronic.  she is on home oxygen at 2 LPM. Supplemental oxygen was provided and noted-  Currently requiring 4L via NC    .   Signs/symptoms of respiratory failure include- tachypnea and increased work of breathing. Contributing diagnoses includes - CHF Labs and images were reviewed. Patient Has not had a recent ABG. Will treat underlying causes and adjust management of respiratory failure as follows- diuresis

## 2024-11-10 NOTE — ASSESSMENT & PLAN NOTE
"Patient's FSGs are controlled on current medication regimen.  Last A1c reviewed-   Lab Results   Component Value Date    HGBA1C 4.9 09/25/2024     Most recent fingerstick glucose reviewed- No results for input(s): "POCTGLUCOSE" in the last 24 hours.  Current correctional scale  Low  Maintain anti-hyperglycemic dose as follows-   Antihyperglycemics (From admission, onward)      Start     Stop Route Frequency Ordered    11/10/24 1513  insulin aspart U-100 pen 0-5 Units         -- SubQ Before meals & nightly PRN 11/10/24 1414          Hold Oral hypoglycemics while patient is in the hospital.  "

## 2024-11-10 NOTE — SUBJECTIVE & OBJECTIVE
Past Medical History:   Diagnosis Date    Acute on chronic diastolic (congestive) heart failure 11/20/2019    Anticoagulant long-term use     on Plavix since May 2015    Anxiety     Arthritis     Asthma     Atrial fibrillation     Atrial fibrillation with RVR 10/19/2020    Cataracts, bilateral     Chest pain, atypical     Chronic atrial fibrillation with rapid ventricular response 06/23/2017    Colon polyp     Coronary artery disease     Diabetes mellitus     Dry eyes     Esophageal erosions     General anesthetics causing adverse effect in therapeutic use     GERD (gastroesophageal reflux disease)     Heart murmur     Hemorrhoid     High cholesterol     Hypertension     Irritable bowel syndrome     Paroxysmal atrial fibrillation 10/30/2020    Persistent atrial fibrillation 02/10/2020    Shingles 2015    Stenosis of aortic and mitral valves     Stroke     TIA (transient ischemic attack)     Use of cane as ambulatory aid        Past Surgical History:   Procedure Laterality Date    ABDOMINAL SURGERY      stents to SMA    angiocele      2007, 2014    AORTIC VALVE REPLACEMENT N/A 11/19/2019    Procedure: Replacement-valve-aortic;  Surgeon: Jack Capone MD;  Location: [facility] CATH LAB;  Service: Cardiology;  Laterality: N/A;    BREAST SURGERY      left--- a lump--- no cancer    CARDIAC VALVE SURGERY      COLONOSCOPY  2014    COLONOSCOPY N/A 3/14/2018    Procedure: COLONOSCOPY;  Surgeon: Efren Kemp MD;  Location: [facility] ENDO (4TH FLR);  Service: Endoscopy;  Laterality: N/A;  ok to hold Plavix 5 days prior to procedure per Dr RESHMA Hernandez     ok per Dr Kemp to hold Eliquis 2 days prior to procedure (see telephone encounter dated-2/7/18)    CORONARY ANGIOGRAPHY N/A 2/15/2024    Procedure: ANGIOGRAM, CORONARY ARTERY;  Surgeon: Jos Baptiste MD;  Location: [facility] CATH LAB;  Service: Cardiology;  Laterality: N/A;    HERNIA REPAIR      HYSTERECTOMY  partial    1982 partial hysterectomy    IMPLANTATION OF BIVENTRICULAR PERMANENT  PACEMAKER AS UPGRADE TO EXISTING PACEMAKER Left 5/29/2024    Procedure: Biventricular pacemaker upgrade;  Surgeon: Chencho Moeller MD;  Location: [facility] EP LAB;  Service: Cardiology;  Laterality: Left;  CHF, AF,  CRTP ugrd, BIO, MAC, SD, 3prep ** BIO dcPPM in situ/ Contrast Prep**    LEFT HEART CATHETERIZATION Right 11/5/2019    Procedure: Left heart cath;  Surgeon: Jack Capone MD;  Location: [facility] CATH LAB;  Service: Cardiology;  Laterality: Right;    left nasal polyp      left neck lymph node      nose polyp      PHLEBOGRAPHY Left 5/1/2024    Procedure: Venogram;  Surgeon: Chencho Moeller MD;  Location: [facility] EP LAB;  Service: Cardiology;  Laterality: Left;  HF, Venogram ( Lt arm) , SD, 3 prep ** BIO dcPPM in situ/ Contrast Prep**    right hip fatty mass tissue      stent to small intestine      SUPERIOR VENA CAVA ANGIOPLASTY / STENTING      TONSILLECTOMY      TOTAL ABDOMINAL HYSTERECTOMY      2014    TOTAL KNEE ARTHROPLASTY Bilateral     TREATMENT OF CARDIAC ARRHYTHMIA N/A 2/10/2020    Procedure: CARDIOVERSION;  Surgeon: Jayy Parrish MD;  Location: [facility] EP LAB;  Service: Cardiology;  Laterality: N/A;  AF, PEDRO, DCCV, MAC, DM, 3 Prep    TREATMENT OF CARDIAC ARRHYTHMIA N/A 5/15/2020    Procedure: CARDIOVERSION;  Surgeon: Hany Bee MD;  Location: [facility] EP LAB;  Service: Cardiology;  Laterality: N/A;  AF, PEDRO, DCCV, MAC, DM, 3 Prep    UPPER GASTROINTESTINAL ENDOSCOPY  2014       Review of patient's allergies indicates:   Allergen Reactions    Celebrex [celecoxib] Shortness Of Breath    Ciprofloxacin Swelling     lip    Dicyclomine Hives    Adhesive Dermatitis    Avelox [moxifloxacin] Swelling    Cilostazol Other (See Comments)     Elevates blood pressure    Eryc [erythromycin] Other (See Comments)     unknown    Iodine and iodide containing products Hives    Keflex [cephalexin] Hives    Meclomen      rashes    Meloxicam      Ear ringing    Metoclopramide Other (See Comments)     High blood pressure    Sulfa  (sulfonamide antibiotics) Itching       Current Facility-Administered Medications on File Prior to Encounter   Medication    0.9%  NaCl infusion    sodium chloride 0.9% flush 5 mL    vancomycin (VANCOCIN) 1,000 mg in dextrose 5 % (D5W) 250 mL IVPB (Vial-Mate)     Current Outpatient Medications on File Prior to Encounter   Medication Sig    albuterol-ipratropium (DUO-NEB) 2.5 mg-0.5 mg/3 mL nebulizer solution Take 3 mLs by nebulization every 4 (four) hours as needed for Wheezing or Shortness of Breath.    apixaban (ELIQUIS) 5 mg Tab Take 1 tablet (5 mg total) by mouth 2 (two) times daily.    ascorbic acid, vitamin C, (VITAMIN C) 500 MG tablet Take 1,000 mg by mouth once daily.     atorvastatin (LIPITOR) 10 MG tablet Take 1 tablet (10 mg total) by mouth once daily.    calcium carbonate/vitamin D3 (CALTRATE 600 + D ORAL) Take 1 tablet by mouth once daily.    carvediloL (COREG) 6.25 MG tablet Take 1 tablet (6.25 mg total) by mouth 2 (two) times daily with meals.    clopidogreL (PLAVIX) 75 mg tablet Take 1 tablet (75 mg total) by mouth once daily.    DULoxetine (CYMBALTA) 20 MG capsule Take 1 capsule (20 mg total) by mouth once daily.    estradioL (ESTRACE) 0.01 % (0.1 mg/gram) vaginal cream Place vaginally.    fexofenadine (ALLEGRA) 60 MG tablet Take 60 mg by mouth once daily.    furosemide (LASIX) 40 MG tablet Take 1 tablet (40 mg total) by mouth 2 (two) times a day.    gabapentin (NEURONTIN) 300 MG capsule Take 1 capsule (300 mg total) by mouth every evening.    isosorbide mononitrate (IMDUR) 60 MG 24 hr tablet Take 1 tablet (60 mg total) by mouth once daily.    LORazepam (ATIVAN) 0.5 MG tablet Take 1 tablet (0.5 mg total) by mouth every morning.    metFORMIN (GLUCOPHAGE) 500 MG tablet Take 1 tablet (500 mg total) by mouth 2 (two) times daily.    montelukast (SINGULAIR) 10 mg tablet Take 10 mg by mouth once daily.    pantoprazole (PROTONIX) 40 MG tablet Take 1 tablet (40 mg total) by mouth once daily.    ranolazine  (RANEXA) 1,000 mg Tb12 TAKE 1 TABLET BY MOUTH TWICE A DAY    spironolactone (ALDACTONE) 25 MG tablet Take 1 tablet (25 mg total) by mouth once daily.     Family History       Problem Relation (Age of Onset)    Heart attack Mother    Heart failure Brother    Stroke Father          Tobacco Use    Smoking status: Never    Smokeless tobacco: Never   Substance and Sexual Activity    Alcohol use: No    Drug use: Never    Sexual activity: Not Currently     Partners: Male     Review of Systems   Constitutional:  Positive for fatigue. Negative for activity change, chills and fever.   HENT:  Negative for trouble swallowing.    Eyes:  Negative for photophobia and visual disturbance.   Respiratory:  Positive for shortness of breath. Negative for cough and chest tightness.    Cardiovascular:  Positive for leg swelling. Negative for chest pain and palpitations.   Gastrointestinal:  Negative for abdominal pain, constipation, diarrhea, nausea and vomiting.   Genitourinary:  Positive for dysuria and frequency. Negative for hematuria.   Musculoskeletal:  Negative for back pain, gait problem and neck pain.   Skin:  Negative for rash and wound.   Neurological:  Negative for dizziness, syncope, speech difficulty and light-headedness.   Psychiatric/Behavioral:  Negative for agitation and confusion. The patient is not nervous/anxious.      Objective:     Vital Signs (Most Recent):  Temp: 97.6 °F (36.4 °C) (11/10/24 1133)  Pulse: 72 (11/10/24 1206)  Resp: (!) 32 (11/10/24 1206)  BP: (!) 170/74 (11/10/24 1206)  SpO2: 95 % (11/10/24 1206) Vital Signs (24h Range):  Temp:  [97.6 °F (36.4 °C)] 97.6 °F (36.4 °C)  Pulse:  [66-72] 72  Resp:  [22-32] 32  SpO2:  [95 %-97 %] 95 %  BP: (158-170)/(74-85) 170/74     Weight: 90.7 kg (200 lb)  Body mass index is 34.33 kg/m².     Physical Exam  Vitals and nursing note reviewed.   Constitutional:       General: She is not in acute distress.     Appearance: She is well-developed. She is ill-appearing.       Comments: Visibly SOB while speaking   HENT:      Head: Normocephalic and atraumatic.      Mouth/Throat:      Pharynx: No oropharyngeal exudate.   Eyes:      Extraocular Movements: Extraocular movements intact.      Conjunctiva/sclera: Conjunctivae normal.   Neck:      Vascular: JVD present.   Cardiovascular:      Rate and Rhythm: Normal rate and regular rhythm.      Heart sounds: Normal heart sounds.   Pulmonary:      Effort: Pulmonary effort is normal. No respiratory distress.      Breath sounds: Rales present.      Comments: On 4L NC  Abdominal:      General: Bowel sounds are normal. There is no distension.      Palpations: Abdomen is soft.      Tenderness: There is no abdominal tenderness.   Genitourinary:     Comments: Dark prince colored urine noted at bedside  Musculoskeletal:         General: No tenderness. Normal range of motion.      Cervical back: Normal range of motion and neck supple.      Right lower leg: Pitting Edema present.      Left lower leg: Pitting Edema present.   Lymphadenopathy:      Cervical: No cervical adenopathy.   Skin:     General: Skin is warm and dry.      Capillary Refill: Capillary refill takes less than 2 seconds.      Findings: No rash.   Neurological:      Mental Status: She is alert and oriented to person, place, and time.      Cranial Nerves: No cranial nerve deficit.      Sensory: No sensory deficit.      Coordination: Coordination normal.   Psychiatric:         Behavior: Behavior normal.         Thought Content: Thought content normal.         Judgment: Judgment normal.                Significant Labs: All pertinent labs within the past 24 hours have been reviewed.  A1C:   Recent Labs   Lab 09/25/24  0312   HGBA1C 4.9     Bilirubin:   Recent Labs   Lab 11/10/24  1200   BILITOT 0.6     BMP:   Recent Labs   Lab 11/10/24  1200   *   *   K 5.6*   CL 86*   CO2 28   BUN 21   CREATININE 0.8   CALCIUM 9.7   MG 1.5*     CBC:   Recent Labs   Lab 11/10/24  1200   WBC 10.86    HGB 9.8*   HCT 31.8*        CMP:   Recent Labs   Lab 11/10/24  1200   *   K 5.6*   CL 86*   CO2 28   *   BUN 21   CREATININE 0.8   CALCIUM 9.7   PROT 7.6   ALBUMIN 3.2*   BILITOT 0.6   ALKPHOS 45   AST 28   ALT 15   ANIONGAP 14     Cardiac Markers:   Recent Labs   Lab 11/10/24  1200   *     Magnesium:   Recent Labs   Lab 11/10/24  1200   MG 1.5*     Troponin:   Recent Labs   Lab 11/10/24  1200   TROPONINI 0.010     TSH:   Recent Labs   Lab 11/10/24  1200   TSH 2.559     Urine Studies:   Recent Labs   Lab 11/10/24  1214   COLORU Yellow   APPEARANCEUA Hazy*   PHUR 7.0   SPECGRAV 1.020   PROTEINUA 3+*   GLUCUA Negative   KETONESU Negative   BILIRUBINUA Negative   OCCULTUA Negative   NITRITE Negative   LEUKOCYTESUR 1+*   RBCUA 3   WBCUA 17*   BACTERIA Many*   SQUAMEPITHEL 23   HYALINECASTS 9*       Significant Imaging: I have reviewed all pertinent imaging results/findings within the past 24 hours.  Imaging Results              X-Ray Chest AP Portable (Final result)  Result time 11/10/24 12:36:02      Final result by Tesfaye Murray MD (11/10/24 12:36:02)                   Impression:      1. Pulmonary findings suggest left pleural effusion and congestive change/edema.  Developing left lower lung zone consolidation not excluded in this hypoventilatory exam.      Electronically signed by: Tesfaye Murray MD  Date:    11/10/2024  Time:    12:36               Narrative:    EXAMINATION:  XR CHEST AP PORTABLE    CLINICAL HISTORY:  CHF;    TECHNIQUE:  Single frontal view of the chest was performed.    COMPARISON:  10/04/2024    FINDINGS:  The cardiomediastinal silhouette is prominent, similar to the previous exam noting calcification of the aorta and surgical change..  There is obscuration of the left costophrenic angle suggesting effusion.  Left chest wall pacer noted..  The trachea is midline.  The lungs are symmetrically expanded bilaterally with coarse central hilar interstitial  attenuation, suggesting congestive change or edema..  Developing left lower lung zone consolidation not excluded.  There is no pneumothorax.  The osseous structures are remarkable for degenerative change..

## 2024-11-10 NOTE — ED TRIAGE NOTES
Adriana Strong, a 79 y.o. female presents to the ED w/ complaint of SOB. Spouse states pt been feeling weak over the last few days. Endorses hx of CHF. Reports recent changes to Lasix related to changes in sodium.     Triage note:  Chief Complaint   Patient presents with    Fatigue     Arrives via ems from home.  Hx CHF and recently put lasixs.  Not following diet outpatient recent stay here.  Endorses generalized weakness.      Review of patient's allergies indicates:   Allergen Reactions    Celebrex [celecoxib] Shortness Of Breath    Ciprofloxacin Swelling     lip    Dicyclomine Hives    Adhesive Dermatitis    Avelox [moxifloxacin] Swelling    Cilostazol Other (See Comments)     Elevates blood pressure    Eryc [erythromycin] Other (See Comments)     unknown    Iodine and iodide containing products Hives    Keflex [cephalexin] Hives    Meclomen      rashes    Meloxicam      Ear ringing    Metoclopramide Other (See Comments)     High blood pressure    Sulfa (sulfonamide antibiotics) Itching     Past Medical History:   Diagnosis Date    Acute on chronic diastolic (congestive) heart failure 11/20/2019    Anticoagulant long-term use     on Plavix since May 2015    Anxiety     Arthritis     Asthma     Atrial fibrillation     Atrial fibrillation with RVR 10/19/2020    Cataracts, bilateral     Chest pain, atypical     Chronic atrial fibrillation with rapid ventricular response 06/23/2017    Colon polyp     Coronary artery disease     Diabetes mellitus     Dry eyes     Esophageal erosions     General anesthetics causing adverse effect in therapeutic use     GERD (gastroesophageal reflux disease)     Heart murmur     Hemorrhoid     High cholesterol     Hypertension     Irritable bowel syndrome     Paroxysmal atrial fibrillation 10/30/2020    Persistent atrial fibrillation 02/10/2020    Shingles 2015    Stenosis of aortic and mitral valves     Stroke     TIA (transient ischemic attack)     Use of cane as ambulatory aid

## 2024-11-10 NOTE — ASSESSMENT & PLAN NOTE
Hyponatremia is likely due to Heart Failure. The patient's most recent sodium results are listed below.  Recent Labs     11/08/24  1015 11/10/24  1200   * 128*     Plan  - Correct the sodium by 4-6mEq in 24 hours.   - Obtain the following studies: Urine sodium, urine osmolality, serum osmolality or TSH, T4.  - Will treat the hyponatremia with diuresis  - Monitor sodium Every 8 hours.   - Patient hyponatremia is stable

## 2024-11-10 NOTE — ED PROVIDER NOTES
Encounter Date: 11/10/2024       History     Chief Complaint   Patient presents with    Fatigue     Arrives via ems from home.  Hx CHF and recently put lasixs.  Not following diet outpatient recent stay here.  Endorses generalized weakness.      HPI  78 Y/O O2-dependent (2 L) F with multiple comorbidities including history of heart failure status post pacemaker years ago C/O worsening dyspnea on exertion, weight gain and decreased versus no exercise tolerance secondary to worsening shortness of breath.  No reported increased need of FiO2 at home.  No chest/back/abdominal pain reported.  No migratory back pain reported.  No abdominal pain, nausea vomiting or recent illness.  No fever, chills, sore throat, nasal congestion/rhinorrhea, or reported cough.  Otherwise no urinary complaints.   reports despite 40 mg of furosemide b.i.d., there has been increase in weight gain.    Review of patient's allergies indicates:   Allergen Reactions    Celebrex [celecoxib] Shortness Of Breath    Ciprofloxacin Swelling     lip    Dicyclomine Hives    Adhesive Dermatitis    Avelox [moxifloxacin] Swelling    Cilostazol Other (See Comments)     Elevates blood pressure    Eryc [erythromycin] Other (See Comments)     unknown    Iodine and iodide containing products Hives    Keflex [cephalexin] Hives    Meclomen      rashes    Meloxicam      Ear ringing    Metoclopramide Other (See Comments)     High blood pressure    Sulfa (sulfonamide antibiotics) Itching     Past Medical History:   Diagnosis Date    Acute on chronic diastolic (congestive) heart failure 11/20/2019    Anticoagulant long-term use     on Plavix since May 2015    Anxiety     Arthritis     Asthma     Atrial fibrillation     Atrial fibrillation with RVR 10/19/2020    Cataracts, bilateral     Chest pain, atypical     Chronic atrial fibrillation with rapid ventricular response 06/23/2017    Colon polyp     Coronary artery disease     Diabetes mellitus     Dry eyes      Esophageal erosions     General anesthetics causing adverse effect in therapeutic use     GERD (gastroesophageal reflux disease)     Heart murmur     Hemorrhoid     High cholesterol     Hypertension     Irritable bowel syndrome     Paroxysmal atrial fibrillation 10/30/2020    Persistent atrial fibrillation 02/10/2020    Shingles 2015    Stenosis of aortic and mitral valves     Stroke     TIA (transient ischemic attack)     Use of cane as ambulatory aid      Past Surgical History:   Procedure Laterality Date    ABDOMINAL SURGERY      stents to SMA    angiocele      2007, 2014    AORTIC VALVE REPLACEMENT N/A 11/19/2019    Procedure: Replacement-valve-aortic;  Surgeon: Jack Capone MD;  Location: [facility] CATH LAB;  Service: Cardiology;  Laterality: N/A;    BREAST SURGERY      left--- a lump--- no cancer    CARDIAC VALVE SURGERY      COLONOSCOPY  2014    COLONOSCOPY N/A 3/14/2018    Procedure: COLONOSCOPY;  Surgeon: Efren Kemp MD;  Location: [facility] ENDO ([address] [place] [address]);  Service: Endoscopy;  Laterality: N/A;  ok to hold Plavix 5 days prior to procedure per Dr RESHMA Hernandez     ok per Dr Kemp to hold Eliquis 2 days prior to procedure (see telephone encounter dated-2/7/18)    CORONARY ANGIOGRAPHY N/A 2/15/2024    Procedure: ANGIOGRAM, CORONARY ARTERY;  Surgeon: Jos Baptiste MD;  Location: [facility] CATH LAB;  Service: Cardiology;  Laterality: N/A;    HERNIA REPAIR      HYSTERECTOMY  partial    1982 partial hysterectomy    IMPLANTATION OF BIVENTRICULAR PERMANENT PACEMAKER AS UPGRADE TO EXISTING PACEMAKER Left 5/29/2024    Procedure: Biventricular pacemaker upgrade;  Surgeon: Chencho Moeller MD;  Location: [facility] EP LAB;  Service: Cardiology;  Laterality: Left;  CHF, AF,  CRTP ugrd, BIO, MAC, VA, 3prep ** BIO dcPPM in situ/ Contrast Prep**    LEFT HEART CATHETERIZATION Right 11/5/2019    Procedure: Left heart cath;  Surgeon: Jack Capone MD;  Location: [facility] CATH LAB;  Service: Cardiology;  Laterality: Right;    left nasal polyp       left neck lymph node      nose polyp      PHLEBOGRAPHY Left 5/1/2024    Procedure: Venogram;  Surgeon: Chencho Moeller MD;  Location: [facility] EP LAB;  Service: Cardiology;  Laterality: Left;  HF, Venogram ( Lt arm) , WI, 3 prep ** BIO dcPPM in situ/ Contrast Prep**    right hip fatty mass tissue      stent to small intestine      SUPERIOR VENA CAVA ANGIOPLASTY / STENTING      TONSILLECTOMY      TOTAL ABDOMINAL HYSTERECTOMY      2014    TOTAL KNEE ARTHROPLASTY Bilateral     TREATMENT OF CARDIAC ARRHYTHMIA N/A 2/10/2020    Procedure: CARDIOVERSION;  Surgeon: Jayy Parrish MD;  Location: [facility] EP LAB;  Service: Cardiology;  Laterality: N/A;  AF, PEDRO, DCCV, MAC, DM, 3 Prep    TREATMENT OF CARDIAC ARRHYTHMIA N/A 5/15/2020    Procedure: CARDIOVERSION;  Surgeon: Hany Bee MD;  Location: [facility] EP LAB;  Service: Cardiology;  Laterality: N/A;  AF, PEDRO, DCCV, MAC, DM, 3 Prep    UPPER GASTROINTESTINAL ENDOSCOPY  2014     Family History   Problem Relation Name Age of Onset    Heart attack Mother      Stroke Father      Heart failure Brother      Colon cancer Neg Hx      Inflammatory bowel disease Neg Hx       Social History     Tobacco Use    Smoking status: Never    Smokeless tobacco: Never   Substance Use Topics    Alcohol use: No    Drug use: Never     Review of Systems    Physical Exam     Initial Vitals [11/10/24 1133]   BP Pulse Resp Temp SpO2   (!) 158/85 66 (!) 22 97.6 °F (36.4 °C) 97 %      MAP       --         Physical Exam  GENERAL: Calm; Cooperative; high BMI; mild distress secondary to shortness of breath and tachypnea.  HEENT: AT/NC; anicteric sclera; speaking 2-3 word sentences sentences with no slurring of speech or drooling/inability to tolerate oral secretions secondary to tachypnea  NECK:  No stridor. Supple, FROM with no meningismus, no accessory muscle use. + JVD and hepatojugular reflux with negative carotid bruits.  THORAX: Atraumatic with +TTP that elicits/reproduces subjective chest pain; no  appreciated crepitus.   HEART: Regular rate and rhythm, no M/G/T.  LUNGS: + 2 L O2 with O2 sat of 96%.  Tachypnea with posterior bibasilar rales 2/3 up and diminished breath sounds right lower lobe; no retractions or accessory muscle use; no wheezing.    ABDOMEN: Soft, ND, NTTP.   EXTREMITIES: FROM.  + 1+ bilateral pitting edema to mid tibia.  SKIN: Warm, Dry, No Skin Tears or Rashes.  VASCULAR: 2+ pulses Prox/Dist & Symmetrical with No delay.  NEUROLOGIC: AAOx3; answering questions appropriately without focal deficit    ED Course   Procedures  Labs Reviewed   CBC W/ AUTO DIFFERENTIAL - Abnormal       Result Value    WBC 10.86      RBC 3.61 (*)     Hemoglobin 9.8 (*)     Hematocrit 31.8 (*)     MCV 88      MCH 27.1      MCHC 30.8 (*)     RDW 17.5 (*)     Platelets 353      MPV 9.5      Immature Granulocytes 1.0 (*)     Gran # (ANC) 8.9 (*)     Immature Grans (Abs) 0.11 (*)     Lymph # 0.7 (*)     Mono # 1.2 (*)     Eos # 0.0      Baso # 0.04      nRBC 0      Gran % 81.8 (*)     Lymph % 6.1 (*)     Mono % 10.7      Eosinophil % 0.0      Basophil % 0.4      Differential Method Automated     COMPREHENSIVE METABOLIC PANEL - Abnormal    Sodium 128 (*)     Potassium 5.6 (*)     Chloride 86 (*)     CO2 28      Glucose 157 (*)     BUN 21      Creatinine 0.8      Calcium 9.7      Total Protein 7.6      Albumin 3.2 (*)     Total Bilirubin 0.6      Alkaline Phosphatase 45      AST 28      ALT 15      eGFR >60.0      Anion Gap 14     B-TYPE NATRIURETIC PEPTIDE - Abnormal     (*)    MAGNESIUM - Abnormal    Magnesium 1.5 (*)    URINALYSIS, REFLEX TO URINE CULTURE - Abnormal    Specimen UA Urine, Clean Catch      Color, UA Yellow      Appearance, UA Hazy (*)     pH, UA 7.0      Specific Gravity, UA 1.020      Protein, UA 3+ (*)     Glucose, UA Negative      Ketones, UA Negative      Bilirubin (UA) Negative      Occult Blood UA Negative      Nitrite, UA Negative      Leukocytes, UA 1+ (*)     Narrative:     Specimen  Source->Urine   URINALYSIS MICROSCOPIC - Abnormal    RBC, UA 3      WBC, UA 17 (*)     Bacteria Many (*)     Yeast, UA Few (*)     Squam Epithel, UA 23      Hyaline Casts, UA 9 (*)     Microscopic Comment SEE COMMENT      Narrative:     Specimen Source->Urine   BASIC METABOLIC PANEL - Abnormal    Sodium 127 (*)     Potassium 5.2 (*)     Chloride 86 (*)     CO2 30 (*)     Glucose 133 (*)     BUN 20      Creatinine 0.7      Calcium 9.6      Anion Gap 11      eGFR >60.0     SODIUM - Abnormal    Sodium 127 (*)    SODIUM - Abnormal    Sodium 131 (*)    POCT GLUCOSE - Abnormal    POCT Glucose 155 (*)    ISTAT PROCEDURE - Abnormal    POC PH 7.302 (*)     POC PCO2 85.1 (*)     POC PO2 33 (*)     POC HCO3 42.1 (*)     POC BE 16 (*)     POC SATURATED O2 52      POC TCO2 45 (*)     Sample VENOUS      Site Other      Allens Test N/A     ISTAT PROCEDURE - Abnormal    POC PH 7.318 (*)     POC PCO2 78.8 (*)     POC PO2 94      POC HCO3 40.4 (*)     POC BE 14 (*)     POC SATURATED O2 96      POC Sodium 127 (*)     POC Potassium 4.4      POC TCO2 43 (*)     POC Ionized Calcium 1.17      POC Hematocrit 33 (*)     Sample ARTERIAL      Site LR      Allens Test Pass     CULTURE, URINE   TROPONIN I    Troponin I 0.010     TSH    TSH 2.559     HEMOGLOBIN A1C    Hemoglobin A1C 5.2      Estimated Avg Glucose 103     OSMOLALITY, URINE RANDOM    Osmolality, Urine 307      Narrative:     Specimen Source->Urine   OSMOLALITY, SERUM    Osmolality 280     CREATININE, URINE, RANDOM    Creatinine, Urine 26.0      Narrative:     Specimen Source->Urine   SODIUM, URINE, RANDOM    Sodium, Urine 64      Narrative:     Specimen Source->Urine   ISTAT LACTATE    POC Lactate 1.96      Sample VENOUS      Site Other      Allens Test N/A     ISTAT LACTATE    POC Lactate 0.68      Sample VENOUS      Site Other      Allens Test N/A     POCT GLUCOSE MONITORING CONTINUOUS        ECG Results              EKG 12-lead (Final result)        Collection Time Result Time  QRS Duration OHS QTC Calculation    11/10/24 14:58:23 11/11/24 08:35:23 98 416                     Final result by Interface, Lab In [facility] (11/11/24 08:35:28)                   Narrative:    Test Reason : R53.83,    Vent. Rate : 060 BPM     Atrial Rate : 326 BPM     P-R Int : 000 ms          QRS Dur : 098 ms      QT Int : 416 ms       P-R-T Axes : 000 096 052 degrees     QTc Int : 416 ms    Junctional rhythm  Atrial fibrillation    Rightward axis  Low voltage QRS  Incomplete right bundle branch block  Cannot rule out Anteroseptal infarct (cited on or before 10-NOV-2024)  Abnormal ECG  When compared with ECG of 10-NOV-2024 11:50,  No significant change was found  Confirmed by ARYA ABDUL MD ([id number]) on 11/11/2024 8:35:18 AM    Referred By: [email or web address]   SELF           Confirmed By:ARYA ABDUL MD                                     EKG 12-lead (Final result)        Collection Time Result Time QRS Duration OHS QTC Calculation    11/10/24 11:50:53 11/11/24 08:03:34 104 416                     Final result by Interface, Lab In [facility] (11/11/24 08:03:40)                   Narrative:    Test Reason : R06.02,    Vent. Rate : 060 BPM     Atrial Rate : 267 BPM     P-R Int : 000 ms          QRS Dur : 104 ms      QT Int : 416 ms       P-R-T Axes : 000 073 059 degrees     QTc Int : 416 ms    Junctional rhythm  Atrial fibrillation  Low voltage QRS  Incomplete right bundle branch block  Cannot rule out Anteroseptal infarct ,age undetermined  Abnormal ECG  When compared with ECG of 30-SEP-2024 05:34,  Junctional rhythm has replaced atrial fibrillaton  Incomplete right bundle branch block is now Present  Minimal criteria for Anteroseptal infarct are now Present  Confirmed by ARYA ABDUL MD ([id number]) on 11/11/2024 8:03:31 AM    Referred By: [email or web address]   SELF           Confirmed By:ARYA ABDUL MD                                  Imaging Results              X-Ray Chest AP Portable (Final result)  Result time 11/10/24 12:36:02       Final result by Tesfaye Murray MD (11/10/24 12:36:02)                   Impression:      1. Pulmonary findings suggest left pleural effusion and congestive change/edema.  Developing left lower lung zone consolidation not excluded in this hypoventilatory exam.      Electronically signed by: Tesfaye Murray MD  Date:    11/10/2024  Time:    12:36               Narrative:    EXAMINATION:  XR CHEST AP PORTABLE    CLINICAL HISTORY:  CHF;    TECHNIQUE:  Single frontal view of the chest was performed.    COMPARISON:  10/04/2024    FINDINGS:  The cardiomediastinal silhouette is prominent, similar to the previous exam noting calcification of the aorta and surgical change..  There is obscuration of the left costophrenic angle suggesting effusion.  Left chest wall pacer noted..  The trachea is midline.  The lungs are symmetrically expanded bilaterally with coarse central hilar interstitial attenuation, suggesting congestive change or edema..  Developing left lower lung zone consolidation not excluded.  There is no pneumothorax.  The osseous structures are remarkable for degenerative change..                                       Medications   sodium chloride 0.9% flush 10 mL (has no administration in time range)   melatonin tablet 6 mg (has no administration in time range)   apixaban tablet 5 mg (5 mg Oral Given 11/11/24 0826)   albuterol-ipratropium 2.5 mg-0.5 mg/3 mL nebulizer solution 3 mL (3 mLs Nebulization Given 11/10/24 2132)   ascorbic acid (vitamin C) tablet 1,000 mg (1,000 mg Oral Given 11/11/24 0827)   atorvastatin tablet 10 mg (10 mg Oral Given 11/11/24 0825)   carvediloL tablet 6.25 mg (6.25 mg Oral Given 11/11/24 0826)   clopidogreL tablet 75 mg (75 mg Oral Given 11/11/24 0825)   DULoxetine DR capsule 20 mg (20 mg Oral Given 11/11/24 0826)   gabapentin capsule 300 mg (300 mg Oral Given 11/11/24 0826)   isosorbide mononitrate 24 hr tablet 60 mg (60 mg Oral Given 11/11/24 0825)   montelukast tablet 10 mg  (10 mg Oral Given 11/11/24 0825)   pantoprazole EC tablet 40 mg (40 mg Oral Given 11/11/24 0825)   ranolazine 12 hr tablet 1,000 mg (1,000 mg Oral Given 11/11/24 0825)   spironolactone tablet 25 mg (25 mg Oral Given 11/11/24 0826)   sodium chloride 0.9% flush 10 mL (has no administration in time range)   ondansetron disintegrating tablet 8 mg (8 mg Oral Given 11/10/24 1835)   polyethylene glycol packet 17 g (has no administration in time range)   acetaminophen tablet 650 mg (has no administration in time range)   naloxone 0.4 mg/mL injection 0.02 mg (has no administration in time range)   glucose chewable tablet 16 g (has no administration in time range)   glucose chewable tablet 24 g (has no administration in time range)   glucagon (human recombinant) injection 1 mg (has no administration in time range)   sodium chloride 0.9% flush 10 mL (has no administration in time range)   insulin aspart U-100 pen 0-5 Units (has no administration in time range)   dextrose 10% bolus 125 mL 125 mL (has no administration in time range)   dextrose 10% bolus 250 mL 250 mL (has no administration in time range)   furosemide injection 80 mg (80 mg Intravenous Given 11/11/24 0826)   prochlorperazine injection Soln 5 mg (has no administration in time range)   hydrALAZINE injection 10 mg (has no administration in time range)   meropenem injection 1 g (has no administration in time range)   doxycycline 100 mg in D5W 100 mL IVPB (MB+) (has no administration in time range)   furosemide injection 80 mg (80 mg Intravenous Given 11/10/24 1216)   furosemide injection 80 mg (80 mg Intravenous Given 11/10/24 1352)   sodium zirconium cyclosilicate packet 5 g (5 g Oral Given 11/10/24 1553)   magnesium sulfate in dextrose IVPB (premix) 1 g (0 g Intravenous Stopped 11/10/24 1708)   LORazepam tablet 0.5 mg (0.5 mg Oral Given 11/10/24 2235)   furosemide injection 120 mg (120 mg Intravenous Given 11/11/24 0025)   magnesium sulfate 2g in water 50mL IVPB  (premix) (0 g Intravenous Stopped 11/11/24 0851)     Medical Decision Making  Afebrile, atraumatic, hemodynamically stable female presents with signs and symptoms of acute pulmonary edema leading to her gradual worsening shortness of breath on exertion and at rest today.  No black or bloody stool reported that would indicate acute anemia, nonetheless, will perform CBC and labs.  She is not altered or reporting any nausea or persistent vomiting that may warrant any antiemetics at this time.  She has pitting edema, JVD, hepatojugular reflux and bibasilar rales consistent with acute pulmonary edema warranting diuresis.  She is not hypotensive with warm course/thorax decreasing need of emergent central access PAO2 at this time and unlikely cardiogenic shock at this time.  ____________________  Harry Escalante MD, [facility]  Emergency Medicine Staff  12:13 PM 11/10/2024      Risk  Prescription drug management.              Attending Attestation:         Attending Critical Care:   Critical Care Times:   Direct Patient Care (initial evaluation, reassessments, and time considering the case)................................................................25 minutes.   Ordering, Reviewing, and Interpreting Diagnostic Studies...............................................................................................................5 minutes.   Documentation..................................................................................................................................................................................5 minutes.   ==============================================================  Total Critical Care Time - exclusive of procedural time: 35 minutes.  ==============================================================  Critical care was necessary to treat or prevent imminent or life-threatening deterioration of the following conditions: congestive heart failure.   Critical care was time spent personally by me on  the following activities: obtaining history from patient or relative, examination of patient, review of old charts, ordering lab, x-rays, and/or EKG, development of treatment plan with patient or relative, ordering and performing treatments and interventions, evaluation of patient's response to treatment, interpretation of cardiac measurements and re-evaluation of patient's conition.   Critical Care Condition: potentially life-threatening                                  Clinical Impression:  Final diagnoses:  [R53.83] Fatigue  [R06.02] Shortness of breath  [R09.89] Bilateral rales  [R60.9] 2+ pitting edema  [J81.0] Acute pulmonary edema (Primary)          ED Disposition Condition    Admit Stable                Wale Escalante MD  11/11/24 7821

## 2024-11-10 NOTE — HPI
Adriana Strong is a 79 y.o.F with hx of diastolic HF, Afib on elqiuis, s/p pacemaker, HLD, HTN, non obstructive CAD, s/p TAVR, asthma, COPD, on home oxygen (2L via NC),  DM, CVA, CHF, AF and PPM with upgrade to CRT P in May 2024, history of inferior mesenteric revascularization with a 5 mm bare metal stent.  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 Patient reports SOB at rest and with exertion. States she saw cardiology last week where labs were performed. She was notified that her sodium was low and MD requested that patient hold her lasix x3d, continue spironolactone and after the 3d start lasix 40mg daily and additional prn. Otherwise, patient reports compliance with medications, fluid restriction, Na restriction. Also endorsing dysuria for the last couple of days as well as decreased oral intake. Denies fever, chills, chest pain, palpitations, abdominal pain, n/v/d, headaches, or any other symptoms at this time.     In ED: AF, hypertensive, tachypneic on 2-4L via NC. CBC with chronic, stable anemia. Na 128, K 5.6, otherwise CMP unremarkable. Mag 1.5. . Trop negative. TSH 2.559. POC lactate 1.96. UA with 3+ protein, 1+ leukocyte esterase, 17 WBCs, many bacteria, 23 squams. Culture pending. POC US in ED with trace pericardial effusion. CXR with left pleural effusion and congestive change/edema, developing left lower lung zone consolidation not excluded in this hypoventilatory exam. S/p lasix 80mg inj x2 in ED. Admitted to  for further evaluation

## 2024-11-11 ENCOUNTER — DOCUMENTATION ONLY (OUTPATIENT)
Dept: CARDIOLOGY | Facility: CLINIC | Age: 79
End: 2024-11-11
Payer: MEDICARE

## 2024-11-11 LAB
ALBUMIN SERPL BCP-MCNC: 2.8 G/DL (ref 3.5–5.2)
ALLENS TEST: ABNORMAL
ALLENS TEST: ABNORMAL
ALP SERPL-CCNC: 39 U/L (ref 40–150)
ALT SERPL W/O P-5'-P-CCNC: 16 U/L (ref 10–44)
ANION GAP SERPL CALC-SCNC: 13 MMOL/L (ref 8–16)
ASCENDING AORTA: 2.44 CM
AST SERPL-CCNC: 38 U/L (ref 10–40)
AV AREA BY CONTINUOUS VTI: 0.7 CM2
AV INDEX (PROSTH): 0.26
AV LVOT MEAN GRADIENT: 2 MMHG
AV LVOT PEAK GRADIENT: 2 MMHG
AV MEAN GRADIENT: 33.3 MMHG
AV PEAK GRADIENT: 43.6 MMHG
AV VALVE AREA BY VELOCITY RATIO: 0.7 CM²
AV VALVE AREA: 0.7 CM2
AV VELOCITY RATIO: 0.24
BASOPHILS # BLD AUTO: 0.02 K/UL (ref 0–0.2)
BASOPHILS NFR BLD: 0.2 % (ref 0–1.9)
BILIRUB SERPL-MCNC: 0.4 MG/DL (ref 0.1–1)
BSA FOR ECHO PROCEDURE: 2.02 M2
BUN SERPL-MCNC: 19 MG/DL (ref 8–23)
CALCIUM SERPL-MCNC: 8.9 MG/DL (ref 8.7–10.5)
CHLORIDE SERPL-SCNC: 85 MMOL/L (ref 95–110)
CO2 SERPL-SCNC: 33 MMOL/L (ref 23–29)
CREAT SERPL-MCNC: 0.7 MG/DL (ref 0.5–1.4)
CV ECHO LV RWT: 0.42 CM
DELSYS: ABNORMAL
DELSYS: ABNORMAL
DIFFERENTIAL METHOD BLD: ABNORMAL
DOP CALC AO PEAK VEL: 3.3 M/S
DOP CALC AO VTI: 75 CM
DOP CALC LVOT AREA: 2.8 CM2
DOP CALC LVOT DIAMETER: 1.9 CM
DOP CALC LVOT PEAK VEL: 0.8 M/S
DOP CALC LVOT STROKE VOLUME: 55.5 CM3
DOP CALCLVOT PEAK VEL VTI: 19.6 CM
E WAVE DECELERATION TIME: 122.98 MS
E/A RATIO: 3.03
E/E' RATIO: 24.89 M/S
ECHO EF ESTIMATED: 69 %
ECHO LV POSTERIOR WALL: 1.1 CM (ref 0.6–1.1)
EJECTION FRACTION: 65 %
EOSINOPHIL # BLD AUTO: 0 K/UL (ref 0–0.5)
EOSINOPHIL NFR BLD: 0.1 % (ref 0–8)
EP: 6
EP: 8
ERYTHROCYTE [DISTWIDTH] IN BLOOD BY AUTOMATED COUNT: 17.7 % (ref 11.5–14.5)
ERYTHROCYTE [SEDIMENTATION RATE] IN BLOOD BY WESTERGREN METHOD: 20 MM/H
ERYTHROCYTE [SEDIMENTATION RATE] IN BLOOD BY WESTERGREN METHOD: 20 MM/H
EST. GFR  (NO RACE VARIABLE): >60 ML/MIN/1.73 M^2
FIO2: 30
FIO2: 40
FRACTIONAL SHORTENING: 37.7 % (ref 28–44)
GLUCOSE SERPL-MCNC: 133 MG/DL (ref 70–110)
HCO3 UR-SCNC: 41 MMOL/L (ref 24–28)
HCO3 UR-SCNC: 45.4 MMOL/L (ref 24–28)
HCT VFR BLD AUTO: 28.2 % (ref 37–48.5)
HCT VFR BLD CALC: 29 %PCV (ref 36–54)
HCT VFR BLD CALC: 31 %PCV (ref 36–54)
HGB BLD-MCNC: 8.8 G/DL (ref 12–16)
IMM GRANULOCYTES # BLD AUTO: 0.09 K/UL (ref 0–0.04)
IMM GRANULOCYTES NFR BLD AUTO: 0.9 % (ref 0–0.5)
INTERVENTRICULAR SEPTUM: 0.8 CM (ref 0.6–1.1)
IP: 18
IP: 20
IVRT: 85.63 MS
LA MAJOR: 6.79 CM
LA MINOR: 6.24 CM
LA WIDTH: 4.62 CM
LEFT ATRIUM SIZE: 4.35 CM
LEFT ATRIUM VOLUME INDEX MOD: 40.7 ML/M2
LEFT ATRIUM VOLUME INDEX: 56.7 ML/M2
LEFT ATRIUM VOLUME MOD: 79.75 ML
LEFT ATRIUM VOLUME: 111.09 CM3
LEFT INTERNAL DIMENSION IN SYSTOLE: 3.3 CM (ref 2.1–4)
LEFT VENTRICLE DIASTOLIC VOLUME INDEX: 69.82 ML/M2
LEFT VENTRICLE DIASTOLIC VOLUME: 136.85 ML
LEFT VENTRICLE MASS INDEX: 95.5 G/M2
LEFT VENTRICLE SYSTOLIC VOLUME INDEX: 21.9 ML/M2
LEFT VENTRICLE SYSTOLIC VOLUME: 43.01 ML
LEFT VENTRICULAR INTERNAL DIMENSION IN DIASTOLE: 5.3 CM (ref 3.5–6)
LEFT VENTRICULAR MASS: 187.3 G
LV LATERAL E/E' RATIO: 22.4
LV SEPTAL E/E' RATIO: 28
LYMPHOCYTES # BLD AUTO: 0.8 K/UL (ref 1–4.8)
LYMPHOCYTES NFR BLD: 8.3 % (ref 18–48)
MAGNESIUM SERPL-MCNC: 1.5 MG/DL (ref 1.6–2.6)
MCH RBC QN AUTO: 26.9 PG (ref 27–31)
MCHC RBC AUTO-ENTMCNC: 31.2 G/DL (ref 32–36)
MCV RBC AUTO: 86 FL (ref 82–98)
MIN VOL: 10.2
MIN VOL: 9.9
MODE: ABNORMAL
MODE: ABNORMAL
MONOCYTES # BLD AUTO: 1.1 K/UL (ref 0.3–1)
MONOCYTES NFR BLD: 11.9 % (ref 4–15)
MV PEAK A VEL: 0.37 M/S
MV PEAK E VEL: 1.12 M/S
NEUTROPHILS # BLD AUTO: 7.5 K/UL (ref 1.8–7.7)
NEUTROPHILS NFR BLD: 78.6 % (ref 38–73)
NRBC BLD-RTO: 0 /100 WBC
OHS CV RV/LV RATIO: 0.62 CM
OHS QRS DURATION: 104 MS
OHS QRS DURATION: 98 MS
OHS QTC CALCULATION: 416 MS
OHS QTC CALCULATION: 416 MS
PCO2 BLDA: 68 MMHG (ref 35–45)
PCO2 BLDA: 71.3 MMHG (ref 35–45)
PH SMN: 7.39 [PH] (ref 7.35–7.45)
PH SMN: 7.41 [PH] (ref 7.35–7.45)
PHOSPHATE SERPL-MCNC: 3.7 MG/DL (ref 2.7–4.5)
PISA TR MAX VEL: 3.67 M/S
PLATELET # BLD AUTO: 320 K/UL (ref 150–450)
PMV BLD AUTO: 9.8 FL (ref 9.2–12.9)
PO2 BLDA: 56 MMHG (ref 40–60)
PO2 BLDA: 91 MMHG (ref 80–100)
POC BE: 16 MMOL/L
POC BE: 21 MMOL/L
POC IONIZED CALCIUM: 1.14 MMOL/L (ref 1.06–1.42)
POC IONIZED CALCIUM: 1.17 MMOL/L (ref 1.06–1.42)
POC SATURATED O2: 87 % (ref 95–100)
POC SATURATED O2: 96 % (ref 95–100)
POC TCO2: 43 MMOL/L (ref 23–27)
POC TCO2: 48 MMOL/L (ref 24–29)
POCT GLUCOSE: 138 MG/DL (ref 70–110)
POCT GLUCOSE: 146 MG/DL (ref 70–110)
POCT GLUCOSE: 150 MG/DL (ref 70–110)
POTASSIUM BLD-SCNC: 3.9 MMOL/L (ref 3.5–5.1)
POTASSIUM BLD-SCNC: 4.6 MMOL/L (ref 3.5–5.1)
POTASSIUM SERPL-SCNC: 4.9 MMOL/L (ref 3.5–5.1)
PROT SERPL-MCNC: 6.8 G/DL (ref 6–8.4)
RA MAJOR: 5.42 CM
RA PRESSURE ESTIMATED: 8 MMHG
RA WIDTH: 3.76 CM
RBC # BLD AUTO: 3.27 M/UL (ref 4–5.4)
RIGHT VENTRICLE DIASTOLIC BASEL DIMENSION: 3.3 CM
RV TB RVSP: 12 MMHG
RV TISSUE DOPPLER FREE WALL SYSTOLIC VELOCITY 1 (APICAL 4 CHAMBER VIEW): 9.31 CM/S
SAMPLE: ABNORMAL
SAMPLE: ABNORMAL
SINUS: 2.56 CM
SITE: ABNORMAL
SITE: ABNORMAL
SODIUM BLD-SCNC: 127 MMOL/L (ref 136–145)
SODIUM BLD-SCNC: 130 MMOL/L (ref 136–145)
SODIUM SERPL-SCNC: 130 MMOL/L (ref 136–145)
SODIUM SERPL-SCNC: 131 MMOL/L (ref 136–145)
SODIUM SERPL-SCNC: 131 MMOL/L (ref 136–145)
SODIUM SERPL-SCNC: 132 MMOL/L (ref 136–145)
SP02: 100
SP02: 99
SPONT RATE: 20
SPONT RATE: 4
STJ: 2.25 CM
TDI LATERAL: 0.05 M/S
TDI SEPTAL: 0.04 M/S
TDI: 0.05 M/S
TR MAX PG: 54 MMHG
TRICUSPID ANNULAR PLANE SYSTOLIC EXCURSION: 1.15 CM
TV PEAK GRADIENT: 54 MMHG
TV REST PULMONARY ARTERY PRESSURE: 62 MMHG
WBC # BLD AUTO: 9.56 K/UL (ref 3.9–12.7)
Z-SCORE OF LEFT VENTRICULAR DIMENSION IN END DIASTOLE: -0.57
Z-SCORE OF LEFT VENTRICULAR DIMENSION IN END SYSTOLE: -0.35

## 2024-11-11 PROCEDURE — 20000000 HC ICU ROOM

## 2024-11-11 PROCEDURE — 85014 HEMATOCRIT: CPT

## 2024-11-11 PROCEDURE — 27100171 HC OXYGEN HIGH FLOW UP TO 24 HOURS

## 2024-11-11 PROCEDURE — 84132 ASSAY OF SERUM POTASSIUM: CPT

## 2024-11-11 PROCEDURE — 63600175 PHARM REV CODE 636 W HCPCS

## 2024-11-11 PROCEDURE — 25000003 PHARM REV CODE 250

## 2024-11-11 PROCEDURE — 99900035 HC TECH TIME PER 15 MIN (STAT)

## 2024-11-11 PROCEDURE — 37799 UNLISTED PX VASCULAR SURGERY: CPT

## 2024-11-11 PROCEDURE — 94761 N-INVAS EAR/PLS OXIMETRY MLT: CPT

## 2024-11-11 PROCEDURE — 85025 COMPLETE CBC W/AUTO DIFF WBC: CPT

## 2024-11-11 PROCEDURE — 84295 ASSAY OF SERUM SODIUM: CPT

## 2024-11-11 PROCEDURE — 84100 ASSAY OF PHOSPHORUS: CPT

## 2024-11-11 PROCEDURE — 82330 ASSAY OF CALCIUM: CPT

## 2024-11-11 PROCEDURE — 99291 CRITICAL CARE FIRST HOUR: CPT | Mod: ,,,

## 2024-11-11 PROCEDURE — 82803 BLOOD GASES ANY COMBINATION: CPT

## 2024-11-11 PROCEDURE — 25000003 PHARM REV CODE 250: Performed by: STUDENT IN AN ORGANIZED HEALTH CARE EDUCATION/TRAINING PROGRAM

## 2024-11-11 PROCEDURE — 84295 ASSAY OF SERUM SODIUM: CPT | Mod: 91

## 2024-11-11 PROCEDURE — 94660 CPAP INITIATION&MGMT: CPT

## 2024-11-11 PROCEDURE — 63600175 PHARM REV CODE 636 W HCPCS: Performed by: STUDENT IN AN ORGANIZED HEALTH CARE EDUCATION/TRAINING PROGRAM

## 2024-11-11 PROCEDURE — 83735 ASSAY OF MAGNESIUM: CPT

## 2024-11-11 PROCEDURE — 80053 COMPREHEN METABOLIC PANEL: CPT

## 2024-11-11 RX ORDER — MEROPENEM 1 G/1
1 INJECTION, POWDER, FOR SOLUTION INTRAVENOUS
Status: DISCONTINUED | OUTPATIENT
Start: 2024-11-11 | End: 2024-11-11

## 2024-11-11 RX ORDER — CLOPIDOGREL BISULFATE 75 MG/1
75 TABLET ORAL DAILY
Status: DISCONTINUED | OUTPATIENT
Start: 2024-11-13 | End: 2024-11-23 | Stop reason: HOSPADM

## 2024-11-11 RX ORDER — DOXYCYCLINE HYCLATE 100 MG
100 TABLET ORAL EVERY 12 HOURS
Status: DISCONTINUED | OUTPATIENT
Start: 2024-11-11 | End: 2024-11-11

## 2024-11-11 RX ORDER — MAGNESIUM SULFATE HEPTAHYDRATE 40 MG/ML
2 INJECTION, SOLUTION INTRAVENOUS
Status: COMPLETED | OUTPATIENT
Start: 2024-11-11 | End: 2024-11-11

## 2024-11-11 RX ORDER — HYDRALAZINE HYDROCHLORIDE 20 MG/ML
10 INJECTION INTRAMUSCULAR; INTRAVENOUS EVERY 6 HOURS PRN
Status: DISCONTINUED | OUTPATIENT
Start: 2024-11-11 | End: 2024-11-23 | Stop reason: HOSPADM

## 2024-11-11 RX ORDER — GABAPENTIN 300 MG/1
300 CAPSULE ORAL NIGHTLY
Status: DISCONTINUED | OUTPATIENT
Start: 2024-11-12 | End: 2024-11-23 | Stop reason: HOSPADM

## 2024-11-11 RX ORDER — BUDESONIDE AND FORMOTEROL FUMARATE DIHYDRATE 160; 4.5 UG/1; UG/1
2 AEROSOL RESPIRATORY (INHALATION) 2 TIMES DAILY
COMMUNITY
Start: 2024-10-23

## 2024-11-11 RX ADMIN — PIPERACILLIN SODIUM AND TAZOBACTAM SODIUM 4.5 G: 4; .5 INJECTION, POWDER, FOR SOLUTION INTRAVENOUS at 09:11

## 2024-11-11 RX ADMIN — FUROSEMIDE 80 MG: 10 INJECTION, SOLUTION INTRAVENOUS at 08:11

## 2024-11-11 RX ADMIN — OXYCODONE HYDROCHLORIDE AND ACETAMINOPHEN 1000 MG: 500 TABLET ORAL at 08:11

## 2024-11-11 RX ADMIN — CEFTRIAXONE 1 G: 1 INJECTION, POWDER, FOR SOLUTION INTRAMUSCULAR; INTRAVENOUS at 08:11

## 2024-11-11 RX ADMIN — DOXYCYCLINE 100 MG: 100 INJECTION, POWDER, LYOPHILIZED, FOR SOLUTION INTRAVENOUS at 01:11

## 2024-11-11 RX ADMIN — SPIRONOLACTONE 25 MG: 25 TABLET ORAL at 08:11

## 2024-11-11 RX ADMIN — FUROSEMIDE 120 MG: 10 INJECTION, SOLUTION INTRAVENOUS at 12:11

## 2024-11-11 RX ADMIN — ISOSORBIDE MONONITRATE 60 MG: 60 TABLET, EXTENDED RELEASE ORAL at 08:11

## 2024-11-11 RX ADMIN — MAGNESIUM SULFATE IN WATER 2 G: 40 INJECTION, SOLUTION INTRAVENOUS at 05:11

## 2024-11-11 RX ADMIN — CARVEDILOL 6.25 MG: 3.12 TABLET, FILM COATED ORAL at 08:11

## 2024-11-11 RX ADMIN — MAGNESIUM SULFATE IN WATER 2 G: 40 INJECTION, SOLUTION INTRAVENOUS at 06:11

## 2024-11-11 RX ADMIN — CLOPIDOGREL BISULFATE 75 MG: 75 TABLET ORAL at 08:11

## 2024-11-11 RX ADMIN — PIPERACILLIN SODIUM AND TAZOBACTAM SODIUM 4.5 G: 4; .5 INJECTION, POWDER, FOR SOLUTION INTRAVENOUS at 03:11

## 2024-11-11 RX ADMIN — MONTELUKAST 10 MG: 10 TABLET, FILM COATED ORAL at 08:11

## 2024-11-11 RX ADMIN — DULOXETINE HYDROCHLORIDE 20 MG: 20 CAPSULE, DELAYED RELEASE ORAL at 08:11

## 2024-11-11 RX ADMIN — GABAPENTIN 300 MG: 300 CAPSULE ORAL at 08:11

## 2024-11-11 RX ADMIN — ATORVASTATIN CALCIUM 10 MG: 10 TABLET, FILM COATED ORAL at 08:11

## 2024-11-11 RX ADMIN — FUROSEMIDE 80 MG: 10 INJECTION, SOLUTION INTRAVENOUS at 05:11

## 2024-11-11 RX ADMIN — PANTOPRAZOLE SODIUM 40 MG: 40 TABLET, DELAYED RELEASE ORAL at 08:11

## 2024-11-11 RX ADMIN — APIXABAN 5 MG: 5 TABLET, FILM COATED ORAL at 08:11

## 2024-11-11 RX ADMIN — RANOLAZINE 1000 MG: 500 TABLET, EXTENDED RELEASE ORAL at 08:11

## 2024-11-11 RX ADMIN — RANOLAZINE 1000 MG: 500 TABLET, EXTENDED RELEASE ORAL at 09:11

## 2024-11-11 RX ADMIN — CARVEDILOL 6.25 MG: 3.12 TABLET, FILM COATED ORAL at 05:11

## 2024-11-11 NOTE — ASSESSMENT & PLAN NOTE
Rec  Additional diuresis  Trend lytes, replete PRN  Ok to continue Bipap support  Strict BP support, lower than sys 160

## 2024-11-11 NOTE — CARE UPDATE
RAPID RESPONSE NURSE PROACTIVE ROUNDING NOTE       Time of Visit:     Admit Date: 11/10/2024  LOS: 0  Code Status: Full Code   Date of Visit: 11/10/2024  : 1945  Age: 79 y.o.  Sex: female  Race: White  Bed: ED :   MRN: [id number]  Was the patient discharged from an ICU this admission? No   Was the patient discharged from a PACU within last 24 hours? No   Did the patient receive conscious sedation/general anesthesia in last 24 hours? No  Was the patient in the ED within the past 24 hours? No  Was the patient on NIPPV within the past 24 hours? No   Attending Physician: Alena Damon MD  Primary Service: Hospitalist   Time spent at the bedside: 15 -30 min    SITUATION    Notified by RONEY.  Reason for alert: Hypercapnia   Called to evaluate the patient for Respiratory.    BACKGROUND     Why is the patient in the hospital?: Acute on chronic diastolic congestive heart failure    Patient has a past medical history of Acute on chronic diastolic (congestive) heart failure, Anticoagulant long-term use, Anxiety, Arthritis, Asthma, Atrial fibrillation, Atrial fibrillation with RVR, Cataracts, bilateral, Chest pain, atypical, Chronic atrial fibrillation with rapid ventricular response, Colon polyp, Coronary artery disease, Diabetes mellitus, Dry eyes, Esophageal erosions, General anesthetics causing adverse effect in therapeutic use, GERD (gastroesophageal reflux disease), Heart murmur, Hemorrhoid, High cholesterol, Hypertension, Irritable bowel syndrome, Paroxysmal atrial fibrillation, Persistent atrial fibrillation, Shingles, Stenosis of aortic and mitral valves, Stroke, TIA (transient ischemic attack), and Use of cane as ambulatory aid.    Last Vitals:  Temp: 98.4 °F (36.9 °C) (11/10 2140)  Pulse: 60 (11/10 2226)  Resp: 26 (11/10 2132)  BP: 198/81 (11/10 2201)  SpO2: 97 % (11/10 2226)    24 Hours Vitals Range:  Temp:  [97.6 °F (36.4 °C)-98.4 °F (36.9 °C)]   Pulse:  [60-72]   Resp:  [14-32]   BP:  (149-208)/(62-86)   SpO2:  [95 %-100 %]     Labs:  Recent Labs     11/10/24  1200 11/10/24  2225   WBC 10.86  --    HGB 9.8*  --    HCT 31.8* 33*     --        Recent Labs     11/08/24  1015 11/10/24  1200 11/10/24  1454   * 128* 127*  127*   K 4.7 5.6* 5.2*   CL 84* 86* 86*   CO2 38* 28 30*   BUN 23* 21 20   CREATININE 0.51 0.8 0.7   * 157* 133*   MG  --  1.5*  --         Recent Labs     11/10/24  1523 11/10/24  2225   PH 7.302* 7.318*   PCO2 85.1* 78.8*   PO2 33* 94   HCO3 42.1* 40.4*   POCSATURATED 52 96   BE 16* 14*        ASSESSMENT     Pt seen during PM rounds; concerned about worsening respiratory status. Pt in ED stretcher w/ HOB elevated. She's lethargic however answers questions appropriately. Tachypnea and accessory muscle use w/ breathing noted; can only speak 1-3 words before needing to take a breath.   Repeat ABG ordered to reassess CO2.     Physical Exam  Constitutional:       Appearance: She is ill-appearing.   Eyes:      Pupils: Pupils are equal, round, and reactive to light.   Cardiovascular:      Rate and Rhythm: Normal rate.      Pulses:           Radial pulses are 1+ on the right side and 1+ on the left side.   Pulmonary:      Effort: Tachypnea and accessory muscle usage present.   Musculoskeletal:      Right lower leg: Edema present.      Left lower leg: Edema present.   Skin:     General: Skin is warm and dry.      Capillary Refill: Capillary refill takes 2 to 3 seconds.      Coloration: Skin is pale.   Neurological:      General: No focal deficit present.      Mental Status: She is lethargic.   Psychiatric:         Mood and Affect: Mood is anxious.         INTERVENTIONS    The patient was seen for Respiratory problem. Staff concerns included tachypnea, hypercapnia, and increased WOB. The following interventions were performed: Bipap, POCT arterial blood gas , continuous pulse ox monitoring continued, continuous cardiac monitoring continued, and inpatient consult to  critical care medicine.    RECOMMENDATIONS    - Initiate BiPAP therapy  - Repeat blood gas (1-2 hours after starting BiPAP)  - Maintain Strict I&Os   - Maintain IV access  - Monitor for worsening respiratory and/ or mental status  - Consider ICU admission   - Follow up with lab results     PROVIDER ESCALATION    Yes/No  Yes    Orders received and case discussed with  Violet Alford NP.    Disposition:  Remain in ED Bed 19. (admit destination pending)     FOLLOW-UP    Bedside RNRaquel  updated on plan of care. Instructed to call the Rapid Response Nurse, Jocelyn Oliver RN at [facility] for additional questions or concerns.

## 2024-11-11 NOTE — ASSESSMENT & PLAN NOTE
Likely 2/2 heart failure.      --Trend BMP daily  --Lasix for volume removal   --Goal Na correction 4-6mEq in 24 hrs  --Follow up urine studies

## 2024-11-11 NOTE — HPI
"Adriana Strong is a 79 y.o.F with hx of diastolic HF, Afib on elqiuis, s/p pacemaker, HLD, HTN, non obstructive CAD, s/p TAVR, asthma, COPD, on home oxygen (2L via [place]) who presented to the ED today with complains of worsening SOB. Hx as per  as patient is somnolent and on Bipap at time of interview. Generally she was doing fine in her usual state of health until Friday. She recently had a medication change, she was told to reduce lasix from BID to daily +prn w/ concerns of hyponatremia on OP labs. Denies recent illness. As per chart review: " patient reports compliance with medications, fluid restriction, Na restriction. Also endorsing dysuria for the last couple of days as well as decreased oral intake. Her only complaints today were worsening shortness of breath, weight gain. Denies fever, chills, chest pain, palpitations, abdominal pain, n/v/d, headaches, or any other symptoms at this time. "     In ED: HR 60, Aflutter, AFebrile, /85-->200/75-->150/68, 100% on Bipap. CBC with chronic, stable anemia. Na 128, K 5.6, otherwise CMP unremarkable. Mag 1.5. . Trop negative. TSH 2.559. POC lactate 1.96. UA with 3+ protein, 1+ leukocyte esterase, 17 WBCs, many bacteria, 23 squams. Culture pending. CXR with left pleural effusion and congestive change/edema, developing left lower lung zone consolidation not excluded in this hypoventilatory exam. S/p lasix 80mg inj x2 in ED. VBG PH 7.318 CO2 78.8 O2 94 HCO3 40.4.     Initially admitted to [facility] but stepped up to MICU for rescue bipap in the setting of flash pulmonary edema and hypercapnic respiratory failure requiring Bipap  "

## 2024-11-11 NOTE — CONSULTS
Consult received. Patient to be admitted to the MICU. Full H&P to follow.    Maggie Garay NP  Critical Care Medicine  11/10/2024   11:58 PM

## 2024-11-11 NOTE — PROGRESS NOTES
[facility] ([facility]) Team notified of pt referral via [facility] (automated inbasket notification) .    Patient screened today 11/11/2024 by provider and LPN for enrollment to program.  The pt is on BiPAP with her  Mr. Anthony Strong in her room.     Call [facility] if anyone tries to change your Fluid pills or Heart medications.   Do daily weights to see if you are gaining fluid. Upon waking empty your bladder weigh yourself without much clothing and before you eat or drink anything. Record your weights and compare them for weight gain of 3 - 4lbs overnight or 5lbs in 3 days. Call [facility] if you have this.  Follow a low Salt/Sodium diet (2,000-3,000mg sodium) and limit your fluid to 1.5 - 2 liters a day.  A liter is a quart. Measure all that you drink.   Stop smoking and start exercising.   Keep all your appointments and call the [facility] if you have any SOB or signs of fluid gain.     Pt was deemed not a candidate for enrollment at this time related to patient refused. Mr. Strong felt he could not make this decision. He stated his daughter is coming to see her mom today. I gave my card to Mr. Strong so he can give our clinic a call when his daughter comes in. He feel his daughter will be able to make the decision about the pt joining our clinic.     Pt will require additional follow up planning per primary team.     If pt status, diagnosis, or treatment plan changes , please place AMB referral to [facility] ([id number]) for [facility] enrollment re-evalution.

## 2024-11-11 NOTE — ED NOTES
"PT continues to complain of shortness of breath with increased work of breathing following breathing treatment. Rocío NP notified with hospitalist team. PT also requesting her "anxiety"medication.  "

## 2024-11-11 NOTE — TELEPHONE ENCOUNTER
Dr Avilez spoke to Bowie on 11/08/2024 regarding new lasix dose.  Patient was admitted to [facility] ICU yesterday 11/10/2024.

## 2024-11-11 NOTE — ASSESSMENT & PLAN NOTE
--Continue home eliquis  --Continuous cardiac monitoring  --EKG PRN  --Replete lytes goal K>4, Mag>2

## 2024-11-11 NOTE — H&P
"  [facility] - Emergency Dept  Critical Care Medicine  History & Physical    Patient Name: Adriana Strong  MRN: [id number]  Admission Date: 11/10/2024  Hospital Length of Stay: 1 days  Code Status: Full Code  Attending Physician: Amarjit Pierre*   Primary Care Provider: Krzysztof Mcgraw MD   Principal Problem: Acute on chronic diastolic congestive heart failure    Subjective:     HPI:  Adriana Strong is a 79 y.o.F with hx of diastolic HF, Afib on elqiuis, s/p pacemaker, HLD, HTN, non obstructive CAD, s/p TAVR, asthma, COPD, on home oxygen (2L via [place]) who presented to the ED today with complains of worsening SOB. Hx as per  as patient is somnolent and on Bipap at time of interview. Generally she was doing fine in her usual state of health until Friday. She recently had a medication change, she was told to reduce lasix from BID to daily +prn w/ concerns of hyponatremia on OP labs. Denies recent illness. As per chart review: " patient reports compliance with medications, fluid restriction, Na restriction. Also endorsing dysuria for the last couple of days as well as decreased oral intake. Her only complaints today were worsening shortness of breath, weight gain. Denies fever, chills, chest pain, palpitations, abdominal pain, n/v/d, headaches, or any other symptoms at this time. "     In ED: HR 60, Aflutter, AFebrile, /85-->200/75-->150/68, 100% on Bipap. CBC with chronic, stable anemia. Na 128, K 5.6, otherwise CMP unremarkable. Mag 1.5. . Trop negative. TSH 2.559. POC lactate 1.96. UA with 3+ protein, 1+ leukocyte esterase, 17 WBCs, many bacteria, 23 squams. Culture pending. CXR with left pleural effusion and congestive change/edema, developing left lower lung zone consolidation not excluded in this hypoventilatory exam. S/p lasix 80mg inj x2 in ED. VBG PH 7.318 CO2 78.8 O2 94 HCO3 40.4.     Initially admitted to [facility] but stepped up to MICU for rescue bipap in the setting of flash " pulmonary edema and hypercapnic respiratory failure requiring Bipap    Hospital/ICU Course:  No notes on file     Past Medical History:   Diagnosis Date    Acute on chronic diastolic (congestive) heart failure 11/20/2019    Anticoagulant long-term use     on Plavix since May 2015    Anxiety     Arthritis     Asthma     Atrial fibrillation     Atrial fibrillation with RVR 10/19/2020    Cataracts, bilateral     Chest pain, atypical     Chronic atrial fibrillation with rapid ventricular response 06/23/2017    Colon polyp     Coronary artery disease     Diabetes mellitus     Dry eyes     Esophageal erosions     General anesthetics causing adverse effect in therapeutic use     GERD (gastroesophageal reflux disease)     Heart murmur     Hemorrhoid     High cholesterol     Hypertension     Irritable bowel syndrome     Paroxysmal atrial fibrillation 10/30/2020    Persistent atrial fibrillation 02/10/2020    Shingles 2015    Stenosis of aortic and mitral valves     Stroke     TIA (transient ischemic attack)     Use of cane as ambulatory aid        Past Surgical History:   Procedure Laterality Date    ABDOMINAL SURGERY      stents to SMA    angiocele      2007, 2014    AORTIC VALVE REPLACEMENT N/A 11/19/2019    Procedure: Replacement-valve-aortic;  Surgeon: Jack Capone MD;  Location: [facility] CATH LAB;  Service: Cardiology;  Laterality: N/A;    BREAST SURGERY      left--- a lump--- no cancer    CARDIAC VALVE SURGERY      COLONOSCOPY  2014    COLONOSCOPY N/A 3/14/2018    Procedure: COLONOSCOPY;  Surgeon: Efren Kemp MD;  Location: [facility] ENDO ([address] [place] [address]);  Service: Endoscopy;  Laterality: N/A;  ok to hold Plavix 5 days prior to procedure per Dr RESHMA Hernandez     ok per Dr Kemp to hold Eliquis 2 days prior to procedure (see telephone encounter dated-2/7/18)    CORONARY ANGIOGRAPHY N/A 2/15/2024    Procedure: ANGIOGRAM, CORONARY ARTERY;  Surgeon: Jos Baptiste MD;  Location: [facility] CATH LAB;  Service: Cardiology;  Laterality: N/A;     HERNIA REPAIR      HYSTERECTOMY  partial    1982 partial hysterectomy    IMPLANTATION OF BIVENTRICULAR PERMANENT PACEMAKER AS UPGRADE TO EXISTING PACEMAKER Left 5/29/2024    Procedure: Biventricular pacemaker upgrade;  Surgeon: Chencho Moeller MD;  Location: [facility] EP LAB;  Service: Cardiology;  Laterality: Left;  CHF, AF,  CRTP ugrd, BIO, MAC, MS, 3prep ** BIO dcPPM in situ/ Contrast Prep**    LEFT HEART CATHETERIZATION Right 11/5/2019    Procedure: Left heart cath;  Surgeon: Jack Capone MD;  Location: [facility] CATH LAB;  Service: Cardiology;  Laterality: Right;    left nasal polyp      left neck lymph node      nose polyp      PHLEBOGRAPHY Left 5/1/2024    Procedure: Venogram;  Surgeon: Chencho Moeller MD;  Location: [facility] EP LAB;  Service: Cardiology;  Laterality: Left;  HF, Venogram ( Lt arm) , MS, 3 prep ** BIO dcPPM in situ/ Contrast Prep**    right hip fatty mass tissue      stent to small intestine      SUPERIOR VENA CAVA ANGIOPLASTY / STENTING      TONSILLECTOMY      TOTAL ABDOMINAL HYSTERECTOMY      2014    TOTAL KNEE ARTHROPLASTY Bilateral     TREATMENT OF CARDIAC ARRHYTHMIA N/A 2/10/2020    Procedure: CARDIOVERSION;  Surgeon: Jayy Parrish MD;  Location: [facility] EP LAB;  Service: Cardiology;  Laterality: N/A;  AF, PEDRO, DCCV, MAC, DM, 3 Prep    TREATMENT OF CARDIAC ARRHYTHMIA N/A 5/15/2020    Procedure: CARDIOVERSION;  Surgeon: Hany Bee MD;  Location: [facility] EP LAB;  Service: Cardiology;  Laterality: N/A;  AF, PEDRO, DCCV, MAC, DM, 3 Prep    UPPER GASTROINTESTINAL ENDOSCOPY  2014       Review of patient's allergies indicates:   Allergen Reactions    Celebrex [celecoxib] Shortness Of Breath    Ciprofloxacin Swelling     lip    Dicyclomine Hives    Adhesive Dermatitis    Avelox [moxifloxacin] Swelling    Cilostazol Other (See Comments)     Elevates blood pressure    Eryc [erythromycin] Other (See Comments)     unknown    Iodine and iodide containing products Hives    Keflex [cephalexin] Hives    Meclomen       rashes    Meloxicam      Ear ringing    Metoclopramide Other (See Comments)     High blood pressure    Sulfa (sulfonamide antibiotics) Itching       Family History       Problem Relation (Age of Onset)    Heart attack Mother    Heart failure Brother    Stroke Father          Tobacco Use    Smoking status: Never    Smokeless tobacco: Never   Substance and Sexual Activity    Alcohol use: No    Drug use: Never    Sexual activity: Not Currently     Partners: Male      Review of Systems   Constitutional:  Positive for fatigue. Negative for activity change, chills and fever.   HENT:  Negative for trouble swallowing.    Eyes:  Negative for photophobia and visual disturbance.   Respiratory:  Positive for shortness of breath. Negative for cough and chest tightness.    Cardiovascular:  Positive for leg swelling. Negative for chest pain and palpitations.   Gastrointestinal:  Negative for abdominal pain, constipation, diarrhea, nausea and vomiting.   Genitourinary:  Positive for dysuria and frequency. Negative for hematuria.   Musculoskeletal:  Negative for back pain, gait problem and neck pain.   Skin:  Negative for rash and wound.   Neurological:  Negative for dizziness, syncope, speech difficulty and light-headedness.   Psychiatric/Behavioral:  Negative for agitation and confusion. The patient is not nervous/anxious.      Objective:     Vital Signs (Most Recent):  Temp: 98.4 °F (36.9 °C) (11/10/24 2140)  Pulse: 60 (11/10/24 2347)  Resp: (!) 26 (11/10/24 2132)  BP: (!) 150/68 (11/10/24 2347)  SpO2: 100 % (11/10/24 2347) Vital Signs (24h Range):  Temp:  [97.6 °F (36.4 °C)-98.4 °F (36.9 °C)] 98.4 °F (36.9 °C)  Pulse:  [60-72] 60  Resp:  [14-32] 26  SpO2:  [95 %-100 %] 100 %  BP: (146-208)/(62-86) 150/68   Weight: 90.7 kg (200 lb)  Body mass index is 34.33 kg/m².      Intake/Output Summary (Last 24 hours) at 11/10/2024 7205  Last data filed at 11/10/2024 2232  Gross per 24 hour   Intake --   Output 1750 ml   Net -1750 ml           Physical Exam  Vitals and nursing note reviewed.   Constitutional:       General: She is not in acute distress.     Appearance: She is well-developed. She is ill-appearing.      Comments: Fatigued   HENT:      Head: Normocephalic and atraumatic.      Mouth/Throat:      Pharynx: No oropharyngeal exudate.   Eyes:      Extraocular Movements: Extraocular movements intact.      Conjunctiva/sclera: Conjunctivae normal.   Neck:      Vascular: JVD present.   Cardiovascular:      Rate and Rhythm: Normal rate and regular rhythm.      Heart sounds: Normal heart sounds.   Pulmonary:      Effort: Pulmonary effort is normal. No respiratory distress.      Breath sounds: Rales present.   Abdominal:      General: Bowel sounds are normal. There is no distension.      Palpations: Abdomen is soft.      Tenderness: There is no abdominal tenderness.   Musculoskeletal:         General: No tenderness. Normal range of motion.      Cervical back: Normal range of motion and neck supple.      Right lower leg: Pitting Edema present.      Left lower leg: Pitting Edema present.      Comments: 1+ bilateral lower extremity edema   Lymphadenopathy:      Cervical: No cervical adenopathy.   Skin:     General: Skin is warm and dry.      Findings: No rash.   Neurological:      Mental Status: She is alert.   Psychiatric:         Mood and Affect: Mood normal.         Behavior: Behavior normal.            Vents:  Oxygen Concentration (%): 40 (11/10/24 2244)  Lines/Drains/Airways       Peripheral Intravenous Line  Duration                  Peripheral IV - Single Lumen 11/10/24 1201 18 G Anterior;Left;Proximal Forearm <1 day                  Significant Labs:    CBC/Anemia Profile:  Recent Labs   Lab 11/10/24  1200 11/10/24  2225   WBC 10.86  --    HGB 9.8*  --    HCT 31.8* 33*     --    MCV 88  --    RDW 17.5*  --         Chemistries:  Recent Labs   Lab 11/10/24  1200 11/10/24  1454   * 127*  127*   K 5.6* 5.2*   CL 86* 86*   CO2 28  30*   BUN 21 20   CREATININE 0.8 0.7   CALCIUM 9.7 9.6   ALBUMIN 3.2*  --    PROT 7.6  --    BILITOT 0.6  --    ALKPHOS 45  --    ALT 15  --    AST 28  --    MG 1.5*  --      All pertinent labs within the past 24 hours have been reviewed.    Significant Imaging: CXR 11/10/24: Pulmonary findings suggest left pleural effusion and congestive change/edema. Developing left lower lung zone consolidation not excluded in this hypoventilatory exam.  Assessment/Plan:     Pulmonary  Acute on chronic respiratory failure with hypoxia  Patient with Hypercapnic and Hypoxic Respiratory failure which is Acute on chronic.  she is on home oxygen at 2 LPM. Supplemental oxygen was provided and noted- Oxygen Concentration (%):  [40] 40    Signs/symptoms of respiratory failure include- increased work of breathing. Contributing diagnoses includes - CHF Labs and images were reviewed. Patient Has recent ABG, which has been reviewed. Will treat underlying causes and adjust management of respiratory failure as follows:    Suspect worsening respiratory 2/2 flash pulmonary edema in the setting of HTN and CHF exacerbation.      --IV lasix 120mg now  --Continue 80 mg lasix BID  --Continuous BiPAP  --SpO2 goal 88-92% with hx COPD    COPD (chronic obstructive pulmonary disease)  --PRN duonebs    Cardiac/Vascular  * Acute on chronic diastolic congestive heart failure    Recent Labs     11/10/24  1200   *     Latest ECHO  Results for orders placed during the hospital encounter of 09/24/24    Echo    Interpretation Summary    Left Ventricle: The left ventricle is normal in size. Normal wall thickness. There is concentric remodeling. There is normal systolic function with a visually estimated ejection fraction of 60 - 65%. Grade III diastolic dysfunction. Elevated left ventricular filling pressure.    Right Ventricle: Normal right ventricular cavity size. Wall thickness is normal. Systolic function is normal. Pacemaker lead present in the  ventricle.    The left atrium is severely dilated.    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 It is reported to be a 26mm Phan Suzi S3 valve. Aortic valve area by VTI is 1.90 cm2. Aortic valve peak velocity is 1.64 m/s. Mean gradient is 6 mmHg. The dimensionless index is 0.57. There is mild aortic regurgitation.    Tricuspid Valve: There is mild regurgitation.    Pulmonary Artery: The estimated pulmonary artery systolic pressure is 48 mmHg.    IVC/SVC: Elevated venous pressure at 15 mmHg.    Pericardium: There is a small posterior effusion. No indication of cardiac tamponade.    Current Heart Failure Medications  carvediloL tablet 6.25 mg, 2 times daily with meals, Oral  spironolactone tablet 25 mg, Daily, Oral  furosemide injection 80 mg, 2 times daily, Intravenous  furosemide injection 120 mg, Once, Intravenous    --Monitor strict I&Os and daily weights.    --Place on telemetry  --Low sodium diet when able to take PO   --Repeat echo ordered    CAD (coronary artery disease)  --Continue home statin, plavix    Permanent atrial fibrillation  --Continue home eliquis  --Continuous cardiac monitoring  --EKG PRN  --Replete lytes goal K>4, Mag>2    Essential hypertension  --Continue home oral BP meds  --Volume removal with IV lasix    HLD (hyperlipidemia)  --Continue home statin    Renal/  Acute cystitis without hematuria  Reported dysuria with dark urine.  UA concerning for UTI.      --Follow cx data  --Continue CTX    Oncology  Anemia  --Trend CBC daily  --Transfuse for Hgb <7 or active bleeding  --Type and screen    Endocrine  Hyponatremia  Likely 2/2 heart failure.      --Trend BMP daily  --Lasix for volume removal   --Goal Na correction 4-6mEq in 24 hrs  --Follow up urine studies    Type 2 diabetes mellitus  --Accuchecks ACHS  --SSI, hypoglycemia protocol  --Inpatient BG goal 140-180    GI  Gastroesophageal reflux disease  --Continue PPI      Critical Care Time: 40  minutes  Critical secondary to Patient has a condition that poses threat to life and bodily function: Hypoxic Respiratory Failure    Plan discussed with Dr. Hsu.      Critical care was time spent personally by me on the following activities: development of treatment plan with patient or surrogate and bedside caregivers, discussions with consultants, evaluation of patient's response to treatment, examination of patient, ordering and performing treatments and interventions, ordering and review of laboratory studies, ordering and review of radiographic studies, pulse oximetry, re-evaluation of patient's condition. This critical care time did not overlap with that of any other provider or involve time for any procedures.     Maggie Garay NP  Critical Care Medicine  [facility] - Emergency Dept

## 2024-11-11 NOTE — PLAN OF CARE
MICU DAILY GOALS     Family/Goals of care/Code Status   Code Status: Full Code    24H Vital Sign Range  Temp:  [97.6 °F (36.4 °C)-98.4 °F (36.9 °C)]   Pulse:  [60-72]   Resp:  [14-32]   BP: (137-221)/(62-86)   SpO2:  [95 %-100 %]      Shift Events (include procedures and significant events)   Tolerated BiPAP overnight. Magnesium 1.5; replaced.     AWAKE RASS: Goal -    Actual -      Restraint necessity: Not necessary   BREATHE SBT: Not intubated    Coordinate A & B, analgesics/sedatives Pain: managed   SAT: Not intubated   Delirium CAM-ICU: Overall CAM-ICU: Negative   Early(intubated/ Progressive (non-intubated) Mobility MOVE Screen (INTUBATED ONLY): Not intubated    Activity: Activity Management: Rolling - L1   Feeding/Nutrition Diet order: Diet/Nutrition Received: NPO,     Thrombus DVT prophylaxis: VTE Core Measure: Pharmacological prophylaxis initiated/maintained   HOB Elevation Head of Bed (HOB) Positioning: HOB elevated   Ulcer Prophylaxis GI: no   Glucose control managed     Skin Skin assessment:     Sacrum intact/not altered? No  Heels intact/not altered? Yes  Surgical wound? No    CHECK ONE!   (no altered skin or altered skin) and sub boxes:  [] No Altered Skin Integrity Present    []Prevention Measures Documented    [x] Altered Skin Integrity Present or Discovered   [x] LDA present in EPIC, daily doc completed              [] LDA added if not in EPIC (describe wound).                    When describing wound, do not stage, use descriptive words only.    [] Wound Image Taken (required on admit,                   transfer/discharge and every Tuesday)    Wound Care Consulted? Yes   Bowel Function no issues    Indwelling Catheter Necessity      Urethral Catheter 11/11/24 0127 Non-latex 18 Fr.-Reason for Continuing Urinary Catheterization: Critically ill in ICU and requiring hourly monitoring of intake/output          De-escalation Antibiotics No        VS and assessment per flow sheet, patient progressing  towards goals as tolerated, plan of care reviewed with family, all concerns addressed, will continue to monitor.      Problem: Adult Inpatient Plan of Care  Goal: Plan of Care Review  Outcome: Progressing  Goal: Patient-Specific Goal (Individualized)  Outcome: Progressing  Goal: Absence of Hospital-Acquired Illness or Injury  Outcome: Progressing  Goal: Optimal Comfort and Wellbeing  Outcome: Progressing  Goal: Readiness for Transition of Care  Outcome: Progressing     Problem: Wound  Goal: Optimal Coping  Outcome: Progressing  Goal: Optimal Functional Ability  Outcome: Progressing  Goal: Absence of Infection Signs and Symptoms  Outcome: Progressing  Goal: Improved Oral Intake  Outcome: Progressing  Goal: Optimal Pain Control and Function  Outcome: Progressing  Goal: Skin Health and Integrity  Outcome: Progressing  Goal: Optimal Wound Healing  Outcome: Progressing

## 2024-11-11 NOTE — ED NOTES
"This nurse called to room by  stating pt "is short of breath again". This nurse immediately to room. After assessing patient it is noted that pt repeatedly states " I have to pee" bladder scan obtained showing >400 ml in bladder. Harish JEROME notified. Verbal order received for Sims placement. Also notified of increased BP and SHOB complaints  "

## 2024-11-11 NOTE — SUBJECTIVE & OBJECTIVE
Past Medical History:   Diagnosis Date    Acute on chronic diastolic (congestive) heart failure 11/20/2019    Anticoagulant long-term use     on Plavix since May 2015    Anxiety     Arthritis     Asthma     Atrial fibrillation     Atrial fibrillation with RVR 10/19/2020    Cataracts, bilateral     Chest pain, atypical     Chronic atrial fibrillation with rapid ventricular response 06/23/2017    Colon polyp     Coronary artery disease     Diabetes mellitus     Dry eyes     Esophageal erosions     General anesthetics causing adverse effect in therapeutic use     GERD (gastroesophageal reflux disease)     Heart murmur     Hemorrhoid     High cholesterol     Hypertension     Irritable bowel syndrome     Paroxysmal atrial fibrillation 10/30/2020    Persistent atrial fibrillation 02/10/2020    Shingles 2015    Stenosis of aortic and mitral valves     Stroke     TIA (transient ischemic attack)     Use of cane as ambulatory aid        Past Surgical History:   Procedure Laterality Date    ABDOMINAL SURGERY      stents to SMA    angiocele      2007, 2014    AORTIC VALVE REPLACEMENT N/A 11/19/2019    Procedure: Replacement-valve-aortic;  Surgeon: Jack Capone MD;  Location: [facility] CATH LAB;  Service: Cardiology;  Laterality: N/A;    BREAST SURGERY      left--- a lump--- no cancer    CARDIAC VALVE SURGERY      COLONOSCOPY  2014    COLONOSCOPY N/A 3/14/2018    Procedure: COLONOSCOPY;  Surgeon: Efren Kemp MD;  Location: [facility] ENDO (4TH FLR);  Service: Endoscopy;  Laterality: N/A;  ok to hold Plavix 5 days prior to procedure per Dr RESHMA Hernandez     ok per Dr Kemp to hold Eliquis 2 days prior to procedure (see telephone encounter dated-2/7/18)    CORONARY ANGIOGRAPHY N/A 2/15/2024    Procedure: ANGIOGRAM, CORONARY ARTERY;  Surgeon: Jos Baptiste MD;  Location: [facility] CATH LAB;  Service: Cardiology;  Laterality: N/A;    HERNIA REPAIR      HYSTERECTOMY  partial    1982 partial hysterectomy    IMPLANTATION OF BIVENTRICULAR PERMANENT  PACEMAKER AS UPGRADE TO EXISTING PACEMAKER Left 5/29/2024    Procedure: Biventricular pacemaker upgrade;  Surgeon: Chencho Moeller MD;  Location: [facility] EP LAB;  Service: Cardiology;  Laterality: Left;  CHF, AF,  CRTP ugrd, BIO, MAC, LA, 3prep ** BIO dcPPM in situ/ Contrast Prep**    LEFT HEART CATHETERIZATION Right 11/5/2019    Procedure: Left heart cath;  Surgeon: Jack Capone MD;  Location: [facility] CATH LAB;  Service: Cardiology;  Laterality: Right;    left nasal polyp      left neck lymph node      nose polyp      PHLEBOGRAPHY Left 5/1/2024    Procedure: Venogram;  Surgeon: Chencho Moeller MD;  Location: [facility] EP LAB;  Service: Cardiology;  Laterality: Left;  HF, Venogram ( Lt arm) , LA, 3 prep ** BIO dcPPM in situ/ Contrast Prep**    right hip fatty mass tissue      stent to small intestine      SUPERIOR VENA CAVA ANGIOPLASTY / STENTING      TONSILLECTOMY      TOTAL ABDOMINAL HYSTERECTOMY      2014    TOTAL KNEE ARTHROPLASTY Bilateral     TREATMENT OF CARDIAC ARRHYTHMIA N/A 2/10/2020    Procedure: CARDIOVERSION;  Surgeon: Jayy Parrish MD;  Location: [facility] EP LAB;  Service: Cardiology;  Laterality: N/A;  AF, PEDRO, DCCV, MAC, DM, 3 Prep    TREATMENT OF CARDIAC ARRHYTHMIA N/A 5/15/2020    Procedure: CARDIOVERSION;  Surgeon: Hany Bee MD;  Location: [facility] EP LAB;  Service: Cardiology;  Laterality: N/A;  AF, PEDRO, DCCV, MAC, DM, 3 Prep    UPPER GASTROINTESTINAL ENDOSCOPY  2014       Review of patient's allergies indicates:   Allergen Reactions    Celebrex [celecoxib] Shortness Of Breath    Ciprofloxacin Swelling     lip    Dicyclomine Hives    Adhesive Dermatitis    Avelox [moxifloxacin] Swelling    Cilostazol Other (See Comments)     Elevates blood pressure    Eryc [erythromycin] Other (See Comments)     unknown    Iodine and iodide containing products Hives    Keflex [cephalexin] Hives    Meclomen      rashes    Meloxicam      Ear ringing    Metoclopramide Other (See Comments)     High blood pressure    Sulfa  (sulfonamide antibiotics) Itching       Family History       Problem Relation (Age of Onset)    Heart attack Mother    Heart failure Brother    Stroke Father          Tobacco Use    Smoking status: Never    Smokeless tobacco: Never   Substance and Sexual Activity    Alcohol use: No    Drug use: Never    Sexual activity: Not Currently     Partners: Male      Review of Systems   Constitutional:  Positive for fatigue. Negative for activity change, chills and fever.   HENT:  Negative for trouble swallowing.    Eyes:  Negative for photophobia and visual disturbance.   Respiratory:  Positive for shortness of breath. Negative for cough and chest tightness.    Cardiovascular:  Positive for leg swelling. Negative for chest pain and palpitations.   Gastrointestinal:  Negative for abdominal pain, constipation, diarrhea, nausea and vomiting.   Genitourinary:  Positive for dysuria and frequency. Negative for hematuria.   Musculoskeletal:  Negative for back pain, gait problem and neck pain.   Skin:  Negative for rash and wound.   Neurological:  Negative for dizziness, syncope, speech difficulty and light-headedness.   Psychiatric/Behavioral:  Negative for agitation and confusion. The patient is not nervous/anxious.      Objective:     Vital Signs (Most Recent):  Temp: 98.4 °F (36.9 °C) (11/10/24 2140)  Pulse: 60 (11/10/24 2347)  Resp: (!) 26 (11/10/24 2132)  BP: (!) 150/68 (11/10/24 2347)  SpO2: 100 % (11/10/24 2347) Vital Signs (24h Range):  Temp:  [97.6 °F (36.4 °C)-98.4 °F (36.9 °C)] 98.4 °F (36.9 °C)  Pulse:  [60-72] 60  Resp:  [14-32] 26  SpO2:  [95 %-100 %] 100 %  BP: (146-208)/(62-86) 150/68   Weight: 90.7 kg (200 lb)  Body mass index is 34.33 kg/m².      Intake/Output Summary (Last 24 hours) at 11/10/2024 9623  Last data filed at 11/10/2024 2232  Gross per 24 hour   Intake --   Output 1750 ml   Net -1750 ml          Physical Exam  Vitals and nursing note reviewed.   Constitutional:       General: She is not in acute  distress.     Appearance: She is well-developed. She is ill-appearing.      Comments: Fatigued   HENT:      Head: Normocephalic and atraumatic.      Mouth/Throat:      Pharynx: No oropharyngeal exudate.   Eyes:      Extraocular Movements: Extraocular movements intact.      Conjunctiva/sclera: Conjunctivae normal.   Neck:      Vascular: JVD present.   Cardiovascular:      Rate and Rhythm: Normal rate and regular rhythm.      Heart sounds: Normal heart sounds.   Pulmonary:      Effort: Pulmonary effort is normal. No respiratory distress.      Breath sounds: Rales present.   Abdominal:      General: Bowel sounds are normal. There is no distension.      Palpations: Abdomen is soft.      Tenderness: There is no abdominal tenderness.   Musculoskeletal:         General: No tenderness. Normal range of motion.      Cervical back: Normal range of motion and neck supple.      Right lower leg: Pitting Edema present.      Left lower leg: Pitting Edema present.      Comments: 1+ bilateral lower extremity edema   Lymphadenopathy:      Cervical: No cervical adenopathy.   Skin:     General: Skin is warm and dry.      Findings: No rash.   Neurological:      Mental Status: She is alert.   Psychiatric:         Mood and Affect: Mood normal.         Behavior: Behavior normal.            Vents:  Oxygen Concentration (%): 40 (11/10/24 2244)  Lines/Drains/Airways       Peripheral Intravenous Line  Duration                  Peripheral IV - Single Lumen 11/10/24 1201 18 G Anterior;Left;Proximal Forearm <1 day                  Significant Labs:    CBC/Anemia Profile:  Recent Labs   Lab 11/10/24  1200 11/10/24  2225   WBC 10.86  --    HGB 9.8*  --    HCT 31.8* 33*     --    MCV 88  --    RDW 17.5*  --         Chemistries:  Recent Labs   Lab 11/10/24  1200 11/10/24  1454   * 127*  127*   K 5.6* 5.2*   CL 86* 86*   CO2 28 30*   BUN 21 20   CREATININE 0.8 0.7   CALCIUM 9.7 9.6   ALBUMIN 3.2*  --    PROT 7.6  --    BILITOT 0.6  --     ALKPHOS 45  --    ALT 15  --    AST 28  --    MG 1.5*  --      All pertinent labs within the past 24 hours have been reviewed.    Significant Imaging: CXR 11/10/24: Pulmonary findings suggest left pleural effusion and congestive change/edema. Developing left lower lung zone consolidation not excluded in this hypoventilatory exam.

## 2024-11-11 NOTE — PLAN OF CARE
11/10/24 2049   Post-Acute Status   Post-Acute Authorization Home Health   Home Health Status Pending medical clearance/testing   Discharge Delays None known at this time   Discharge Plan   Discharge Plan A Home with family;Home Health   Discharge Plan B Home       Discharge Plan A and Plan B have been determined by review of patient's clinical status, future medical and therapeutic needs, and coverage/benefits for post-acute care in coordination with multidisciplinary team members.     Sw met pt and pt's spouse  Anthony ([phone]) at bedside. Pt ambulates with a walker, wheelchair. Pt has a bedside commode . Pt is also on O2 at home. Pt and her spouse stated they are agreeable with home health at discharge.      Ion Nino LMSW  Case Management  Emergency Department  [phone]

## 2024-11-11 NOTE — CONSULTS
Food & Nutrition  Education     Diet Education: Fluid and Salt restriction   Time Spent: 3 minutes   Learners: Patient      Handouts provided: Low Sodium Nutrition Therapy, Fluid-Restricted Nutrition Therapy      Comments: Patient was in room with caregiver. Caregiver stated that they are very familiar with the fluid restriction and Low Na diet. Caregiver stated that they and other family members keep patient on the strict diet. All questions/concerns were addressed. RD's contact information was provided.      Follow-Up: Yes     Please Re-consult as needed.   Thanks!    Nida Donohue, MS, RD, LDN

## 2024-11-11 NOTE — ASSESSMENT & PLAN NOTE
Recent Labs     11/10/24  1200   *     Latest ECHO  Results for orders placed during the hospital encounter of 09/24/24    Echo    Interpretation Summary    Left Ventricle: The left ventricle is normal in size. Normal wall thickness. There is concentric remodeling. There is normal systolic function with a visually estimated ejection fraction of 60 - 65%. Grade III diastolic dysfunction. Elevated left ventricular filling pressure.    Right Ventricle: Normal right ventricular cavity size. Wall thickness is normal. Systolic function is normal. Pacemaker lead present in the ventricle.    The left atrium is severely dilated.    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 It is reported to be a 26mm Phan Suzi S3 valve. Aortic valve area by VTI is 1.90 cm2. Aortic valve peak velocity is 1.64 m/s. Mean gradient is 6 mmHg. The dimensionless index is 0.57. There is mild aortic regurgitation.    Tricuspid Valve: There is mild regurgitation.    Pulmonary Artery: The estimated pulmonary artery systolic pressure is 48 mmHg.    IVC/SVC: Elevated venous pressure at 15 mmHg.    Pericardium: There is a small posterior effusion. No indication of cardiac tamponade.    Current Heart Failure Medications  carvediloL tablet 6.25 mg, 2 times daily with meals, Oral  spironolactone tablet 25 mg, Daily, Oral  furosemide injection 80 mg, 2 times daily, Intravenous  furosemide injection 120 mg, Once, Intravenous    --Monitor strict I&Os and daily weights.    --Place on telemetry  --Low sodium diet when able to take PO   --Repeat echo ordered

## 2024-11-11 NOTE — ED NOTES
PT noted to still have labored respirations and appears to be struggling to catch breath. Breathing treatment requested from respiratory at this time per prn orders

## 2024-11-11 NOTE — PLAN OF CARE
[facility] - Emergency Dept  Initial Discharge Assessment       Primary Care Provider: Krzysztof Mcgraw MD    Admission Diagnosis: Acute pulmonary edema [J81.0]    Admission Date: 11/10/2024  Expected Discharge Date: 11/11/2024    Transition of Care Barriers: (P) None    Payor: [employer] [facility] / Plan: ebook[employer] CHOICES / Product Type: Medicare Advantage /     Extended Emergency Contact Information  Primary Emergency Contact: Anthony Strong  Address: [address] [place] [address] [facility]  Home Phone: [phone]  Mobile Phone: [phone]  Relation: Spouse  Secondary Emergency Contact: Tammy Kemp  [address]: [address] [place] [address] [place]  Mobile Phone: [phone]  Relation: Daughter    Discharge Plan A: (P) Home with family, Home Health  Discharge Plan B: (P) Home with family      [id number] - [address] AT CORNER OF [address] [address]  Phone: [phone] Fax: [phone]      Initial Assessment (most recent)       Adult Discharge Assessment - 11/10/24 2051          Discharge Assessment    Assessment Type Discharge Planning Assessment (P)      Confirmed/corrected address, phone number and insurance Yes (P)      Confirmed Demographics Correct on Facesheet (P)      Source of Information patient (P)      Does patient/caregiver understand observation status Yes (P)      Communicated MIMI with patient/caregiver Date not available/Unable to determine (P)      People in Home spouse (P)      Do you expect to return to your current living situation? Yes (P)      Do you have help at home or someone to help you manage your care at home? Yes (P)      Who are your caregiver(s) and their phone number(s)? Anthony Chapman  [phone] (P)      Prior to hospitilization cognitive status: Alert/Oriented (P)      Current cognitive status: Alert/Oriented (P)      Walking or  Climbing Stairs Difficulty yes (P)      Walking or Climbing Stairs ambulation difficulty, requires equipment (P)      Mobility Management rolling walker, wheelchair, cane, (P)      Dressing/Bathing Difficulty yes (P)      Dressing/Bathing bathing difficulty, requires equipment (P)      Home Accessibility wheelchair accessible (P)      Home Layout Able to live on 1st floor (P)      Equipment Currently Used at Home bedside commode;wheelchair;rollator;cane, straight (P)      Readmission within 30 days? No (P)      Patient currently being followed by outpatient case management? No (P)      Do you currently have service(s) that help you manage your care at home? No (P)      Do you take prescription medications? Yes (P)      Do you have prescription coverage? Yes (P)      Do you have any problems affording any of your prescribed medications? No (P)      Is the patient taking medications as prescribed? yes (P)      How do you get to doctors appointments? family or friend will provide (P)      Are you on dialysis? No (P)      Do you take coumadin? No (P)    pt stated she takes Eliquis    Discharge Plan A Home with family;Home Health (P)      Discharge Plan B Home with family (P)      DME Needed Upon Discharge  none (P)      Transition of Care Barriers None (P)         Physical Activity    On average, how many days per week do you engage in moderate to strenuous exercise (like a brisk walk)? 0 days (P)      On average, how many minutes do you engage in exercise at this level? 0 min (P)         Financial Resource Strain    How hard is it for you to pay for the very basics like food, housing, medical care, and heating? Not hard at all (P)         Housing Stability    In the last 12 months, was there a time when you were not able to pay the mortgage or rent on time? No (P)      At any time in the past 12 months, were you homeless or living in a shelter (including now)? No (P)         Transportation Needs    Has the lack of  transportation kept you from medical appointments, meetings, work or from getting things needed for daily living? No (P)         Food Insecurity    Within the past 12 months, you worried that your food would run out before you got the money to buy more. Never true (P)      Within the past 12 months, the food you bought just didn't last and you didn't have money to get more. Never true (P)         Stress    Do you feel stress - tense, restless, nervous, or anxious, or unable to sleep at night because your mind is troubled all the time - these days? Patient declined (P)         Social Isolation    How often do you feel lonely or isolated from those around you?  Never (P)         Alcohol Use    Q1: How often do you have a drink containing alcohol? Never (P)      Q2: How many drinks containing alcohol do you have on a typical day when you are drinking? Patient does not drink (P)      Q3: How often do you have six or more drinks on one occasion? Never (P)         Utilities    In the past 12 months has the electric, gas, oil, or water company threatened to shut off services in your home? No (P)         Health Literacy    How often do you need to have someone help you when you read instructions, pamphlets, or other written material from your doctor or pharmacy? Never (P)                         Ion Nino LMSW  Case Management  Emergency Department  [phone]

## 2024-11-11 NOTE — NURSING
KOMAL Farris DO notified for magnesium 1.5. Per MD, will place order. Will monitor and administer.

## 2024-11-11 NOTE — ASSESSMENT & PLAN NOTE
Patient with Hypercapnic and Hypoxic Respiratory failure which is Acute on chronic.  she is on home oxygen at 2 LPM. Supplemental oxygen was provided and noted- Oxygen Concentration (%):  [40] 40    Signs/symptoms of respiratory failure include- increased work of breathing. Contributing diagnoses includes - CHF Labs and images were reviewed. Patient Has recent ABG, which has been reviewed. Will treat underlying causes and adjust management of respiratory failure as follows:    Suspect worsening respiratory 2/2 flash pulmonary edema in the setting of HTN and CHF exacerbation.      --IV lasix 120mg now  --Continue 80 mg lasix BID  --Continuous BiPAP  --SpO2 goal 88-92% with hx COPD

## 2024-11-12 ENCOUNTER — PATIENT MESSAGE (OUTPATIENT)
Dept: CARDIOLOGY | Facility: CLINIC | Age: 79
End: 2024-11-12
Payer: MEDICARE

## 2024-11-12 PROBLEM — J90 PLEURAL EFFUSION: Status: ACTIVE | Noted: 2024-11-12

## 2024-11-12 LAB
ACID FAST MOD KINY STN SPEC: NORMAL
ALBUMIN FLD-MCNC: 1.8 G/DL
ALBUMIN SERPL BCP-MCNC: 2.7 G/DL (ref 3.5–5.2)
ALP SERPL-CCNC: 41 U/L (ref 40–150)
ALT SERPL W/O P-5'-P-CCNC: 16 U/L (ref 10–44)
ANION GAP SERPL CALC-SCNC: 10 MMOL/L (ref 8–16)
ANION GAP SERPL CALC-SCNC: 13 MMOL/L (ref 8–16)
AST SERPL-CCNC: 21 U/L (ref 10–40)
BASOPHILS # BLD AUTO: 0.05 K/UL (ref 0–0.2)
BASOPHILS NFR BLD: 0.5 % (ref 0–1.9)
BILIRUB SERPL-MCNC: 0.4 MG/DL (ref 0.1–1)
BODY FLUID SOURCE, LDH: NORMAL
BUN SERPL-MCNC: 17 MG/DL (ref 8–23)
BUN SERPL-MCNC: 18 MG/DL (ref 8–23)
CALCIUM SERPL-MCNC: 8.5 MG/DL (ref 8.7–10.5)
CALCIUM SERPL-MCNC: 8.6 MG/DL (ref 8.7–10.5)
CHLORIDE SERPL-SCNC: 83 MMOL/L (ref 95–110)
CHLORIDE SERPL-SCNC: 86 MMOL/L (ref 95–110)
CO2 SERPL-SCNC: 34 MMOL/L (ref 23–29)
CO2 SERPL-SCNC: 36 MMOL/L (ref 23–29)
CREAT SERPL-MCNC: 0.8 MG/DL (ref 0.5–1.4)
CREAT SERPL-MCNC: 0.8 MG/DL (ref 0.5–1.4)
DIFFERENTIAL METHOD BLD: ABNORMAL
EOSINOPHIL # BLD AUTO: 0 K/UL (ref 0–0.5)
EOSINOPHIL NFR BLD: 0.2 % (ref 0–8)
ERYTHROCYTE [DISTWIDTH] IN BLOOD BY AUTOMATED COUNT: 17.6 % (ref 11.5–14.5)
EST. GFR  (NO RACE VARIABLE): >60 ML/MIN/1.73 M^2
EST. GFR  (NO RACE VARIABLE): >60 ML/MIN/1.73 M^2
GLUCOSE FLD-MCNC: 125 MG/DL
GLUCOSE SERPL-MCNC: 124 MG/DL (ref 70–110)
GLUCOSE SERPL-MCNC: 184 MG/DL (ref 70–110)
GRAM STN SPEC: NORMAL
GRAM STN SPEC: NORMAL
HCT VFR BLD AUTO: 28.3 % (ref 37–48.5)
HGB BLD-MCNC: 9.2 G/DL (ref 12–16)
IMM GRANULOCYTES # BLD AUTO: 0.05 K/UL (ref 0–0.04)
IMM GRANULOCYTES NFR BLD AUTO: 0.5 % (ref 0–0.5)
LDH FLD L TO P-CCNC: 114 U/L
LDH SERPL L TO P-CCNC: 261 U/L (ref 110–260)
LYMPHOCYTES # BLD AUTO: 0.7 K/UL (ref 1–4.8)
LYMPHOCYTES NFR BLD: 8.1 % (ref 18–48)
MAGNESIUM SERPL-MCNC: 1.6 MG/DL (ref 1.6–2.6)
MCH RBC QN AUTO: 27.1 PG (ref 27–31)
MCHC RBC AUTO-ENTMCNC: 32.5 G/DL (ref 32–36)
MCV RBC AUTO: 84 FL (ref 82–98)
MONOCYTES # BLD AUTO: 1.1 K/UL (ref 0.3–1)
MONOCYTES NFR BLD: 12.1 % (ref 4–15)
NEUTROPHILS # BLD AUTO: 7.2 K/UL (ref 1.8–7.7)
NEUTROPHILS NFR BLD: 78.6 % (ref 38–73)
NRBC BLD-RTO: 0 /100 WBC
PHOSPHATE SERPL-MCNC: 2.4 MG/DL (ref 2.7–4.5)
PLATELET # BLD AUTO: 299 K/UL (ref 150–450)
PMV BLD AUTO: 10 FL (ref 9.2–12.9)
POCT GLUCOSE: 148 MG/DL (ref 70–110)
POCT GLUCOSE: 152 MG/DL (ref 70–110)
POCT GLUCOSE: 173 MG/DL (ref 70–110)
POCT GLUCOSE: 196 MG/DL (ref 70–110)
POTASSIUM SERPL-SCNC: 3.9 MMOL/L (ref 3.5–5.1)
POTASSIUM SERPL-SCNC: 3.9 MMOL/L (ref 3.5–5.1)
PROT FLD-MCNC: 2.9 G/DL
PROT SERPL-MCNC: 6.3 G/DL (ref 6–8.4)
RBC # BLD AUTO: 3.39 M/UL (ref 4–5.4)
SODIUM SERPL-SCNC: 130 MMOL/L (ref 136–145)
SODIUM SERPL-SCNC: 131 MMOL/L (ref 136–145)
SODIUM SERPL-SCNC: 132 MMOL/L (ref 136–145)
SPECIMEN SOURCE: NORMAL
TROPONIN I SERPL DL<=0.01 NG/ML-MCNC: 0.01 NG/ML (ref 0–0.03)
WBC # BLD AUTO: 9.15 K/UL (ref 3.9–12.7)

## 2024-11-12 PROCEDURE — 80048 BASIC METABOLIC PNL TOTAL CA: CPT | Mod: XB | Performed by: STUDENT IN AN ORGANIZED HEALTH CARE EDUCATION/TRAINING PROGRAM

## 2024-11-12 PROCEDURE — 87205 SMEAR GRAM STAIN: CPT | Performed by: STUDENT IN AN ORGANIZED HEALTH CARE EDUCATION/TRAINING PROGRAM

## 2024-11-12 PROCEDURE — 99900035 HC TECH TIME PER 15 MIN (STAT)

## 2024-11-12 PROCEDURE — 87070 CULTURE OTHR SPECIMN AEROBIC: CPT | Performed by: STUDENT IN AN ORGANIZED HEALTH CARE EDUCATION/TRAINING PROGRAM

## 2024-11-12 PROCEDURE — 63600175 PHARM REV CODE 636 W HCPCS

## 2024-11-12 PROCEDURE — 94799 UNLISTED PULMONARY SVC/PX: CPT

## 2024-11-12 PROCEDURE — 63600175 PHARM REV CODE 636 W HCPCS: Performed by: STUDENT IN AN ORGANIZED HEALTH CARE EDUCATION/TRAINING PROGRAM

## 2024-11-12 PROCEDURE — 88305 TISSUE EXAM BY PATHOLOGIST: CPT | Mod: 26,,, | Performed by: PATHOLOGY

## 2024-11-12 PROCEDURE — 25000003 PHARM REV CODE 250: Performed by: STUDENT IN AN ORGANIZED HEALTH CARE EDUCATION/TRAINING PROGRAM

## 2024-11-12 PROCEDURE — 84295 ASSAY OF SERUM SODIUM: CPT

## 2024-11-12 PROCEDURE — 27100171 HC OXYGEN HIGH FLOW UP TO 24 HOURS

## 2024-11-12 PROCEDURE — 87116 MYCOBACTERIA CULTURE: CPT | Performed by: STUDENT IN AN ORGANIZED HEALTH CARE EDUCATION/TRAINING PROGRAM

## 2024-11-12 PROCEDURE — 94660 CPAP INITIATION&MGMT: CPT

## 2024-11-12 PROCEDURE — 80053 COMPREHEN METABOLIC PANEL: CPT

## 2024-11-12 PROCEDURE — 83615 LACTATE (LD) (LDH) ENZYME: CPT | Mod: 91 | Performed by: STUDENT IN AN ORGANIZED HEALTH CARE EDUCATION/TRAINING PROGRAM

## 2024-11-12 PROCEDURE — 88305 TISSUE EXAM BY PATHOLOGIST: CPT | Performed by: PATHOLOGY

## 2024-11-12 PROCEDURE — 25000003 PHARM REV CODE 250

## 2024-11-12 PROCEDURE — 88112 CYTOPATH CELL ENHANCE TECH: CPT | Mod: 26,,, | Performed by: PATHOLOGY

## 2024-11-12 PROCEDURE — 82945 GLUCOSE OTHER FLUID: CPT | Performed by: STUDENT IN AN ORGANIZED HEALTH CARE EDUCATION/TRAINING PROGRAM

## 2024-11-12 PROCEDURE — 83615 LACTATE (LD) (LDH) ENZYME: CPT | Performed by: STUDENT IN AN ORGANIZED HEALTH CARE EDUCATION/TRAINING PROGRAM

## 2024-11-12 PROCEDURE — 32555 ASPIRATE PLEURA W/ IMAGING: CPT

## 2024-11-12 PROCEDURE — 82042 OTHER SOURCE ALBUMIN QUAN EA: CPT | Performed by: STUDENT IN AN ORGANIZED HEALTH CARE EDUCATION/TRAINING PROGRAM

## 2024-11-12 PROCEDURE — 94761 N-INVAS EAR/PLS OXIMETRY MLT: CPT

## 2024-11-12 PROCEDURE — 83735 ASSAY OF MAGNESIUM: CPT

## 2024-11-12 PROCEDURE — 99233 SBSQ HOSP IP/OBS HIGH 50: CPT | Mod: GC,,, | Performed by: INTERNAL MEDICINE

## 2024-11-12 PROCEDURE — 84295 ASSAY OF SERUM SODIUM: CPT | Mod: 91

## 2024-11-12 PROCEDURE — 20000000 HC ICU ROOM

## 2024-11-12 PROCEDURE — 85025 COMPLETE CBC W/AUTO DIFF WBC: CPT

## 2024-11-12 PROCEDURE — 0W993ZZ DRAINAGE OF RIGHT PLEURAL CAVITY, PERCUTANEOUS APPROACH: ICD-10-PCS | Performed by: INTERNAL MEDICINE

## 2024-11-12 PROCEDURE — 84100 ASSAY OF PHOSPHORUS: CPT

## 2024-11-12 PROCEDURE — 84484 ASSAY OF TROPONIN QUANT: CPT | Performed by: INTERNAL MEDICINE

## 2024-11-12 PROCEDURE — 87206 SMEAR FLUORESCENT/ACID STAI: CPT | Mod: 91 | Performed by: STUDENT IN AN ORGANIZED HEALTH CARE EDUCATION/TRAINING PROGRAM

## 2024-11-12 PROCEDURE — 88112 CYTOPATH CELL ENHANCE TECH: CPT | Performed by: PATHOLOGY

## 2024-11-12 PROCEDURE — 87206 SMEAR FLUORESCENT/ACID STAI: CPT | Performed by: STUDENT IN AN ORGANIZED HEALTH CARE EDUCATION/TRAINING PROGRAM

## 2024-11-12 PROCEDURE — 84157 ASSAY OF PROTEIN OTHER: CPT | Performed by: STUDENT IN AN ORGANIZED HEALTH CARE EDUCATION/TRAINING PROGRAM

## 2024-11-12 PROCEDURE — 27000190 HC CPAP FULL FACE MASK W/VALVE

## 2024-11-12 RX ORDER — SODIUM,POTASSIUM PHOSPHATES 280-250MG
1 POWDER IN PACKET (EA) ORAL ONCE
Status: COMPLETED | OUTPATIENT
Start: 2024-11-12 | End: 2024-11-12

## 2024-11-12 RX ORDER — ALPRAZOLAM 0.5 MG/1
0.5 TABLET ORAL EVERY 8 HOURS PRN
Status: DISCONTINUED | OUTPATIENT
Start: 2024-11-12 | End: 2024-11-12

## 2024-11-12 RX ORDER — LORAZEPAM 0.5 MG/1
0.5 TABLET ORAL EVERY 6 HOURS PRN
Status: DISCONTINUED | OUTPATIENT
Start: 2024-11-12 | End: 2024-11-23 | Stop reason: HOSPADM

## 2024-11-12 RX ORDER — LORAZEPAM 0.5 MG/1
0.5 TABLET ORAL EVERY 8 HOURS PRN
Status: DISCONTINUED | OUTPATIENT
Start: 2024-11-12 | End: 2024-11-12

## 2024-11-12 RX ORDER — MAGNESIUM SULFATE 1 G/100ML
1 INJECTION INTRAVENOUS ONCE
Status: COMPLETED | OUTPATIENT
Start: 2024-11-12 | End: 2024-11-12

## 2024-11-12 RX ORDER — LORAZEPAM 0.5 MG/1
1 TABLET ORAL EVERY 8 HOURS PRN
Status: DISCONTINUED | OUTPATIENT
Start: 2024-11-12 | End: 2024-11-12

## 2024-11-12 RX ORDER — ALPRAZOLAM 0.5 MG/1
0.5 TABLET ORAL ONCE
Status: COMPLETED | OUTPATIENT
Start: 2024-11-12 | End: 2024-11-12

## 2024-11-12 RX ADMIN — FUROSEMIDE 80 MG: 10 INJECTION, SOLUTION INTRAVENOUS at 05:11

## 2024-11-12 RX ADMIN — RANOLAZINE 1000 MG: 500 TABLET, EXTENDED RELEASE ORAL at 08:11

## 2024-11-12 RX ADMIN — OXYCODONE HYDROCHLORIDE AND ACETAMINOPHEN 1000 MG: 500 TABLET ORAL at 08:11

## 2024-11-12 RX ADMIN — DOXYCYCLINE 100 MG: 100 INJECTION, POWDER, LYOPHILIZED, FOR SOLUTION INTRAVENOUS at 01:11

## 2024-11-12 RX ADMIN — GABAPENTIN 300 MG: 300 CAPSULE ORAL at 09:11

## 2024-11-12 RX ADMIN — HYDRALAZINE HYDROCHLORIDE 10 MG: 20 INJECTION INTRAMUSCULAR; INTRAVENOUS at 08:11

## 2024-11-12 RX ADMIN — PIPERACILLIN SODIUM AND TAZOBACTAM SODIUM 4.5 G: 4; .5 INJECTION, POWDER, FOR SOLUTION INTRAVENOUS at 02:11

## 2024-11-12 RX ADMIN — SPIRONOLACTONE 25 MG: 25 TABLET ORAL at 08:11

## 2024-11-12 RX ADMIN — ALPRAZOLAM 0.5 MG: 0.5 TABLET ORAL at 12:11

## 2024-11-12 RX ADMIN — PIPERACILLIN SODIUM AND TAZOBACTAM SODIUM 4.5 G: 4; .5 INJECTION, POWDER, FOR SOLUTION INTRAVENOUS at 09:11

## 2024-11-12 RX ADMIN — POTASSIUM & SODIUM PHOSPHATES POWDER PACK 280-160-250 MG 1 PACKET: 280-160-250 PACK at 06:11

## 2024-11-12 RX ADMIN — HYDRALAZINE HYDROCHLORIDE 10 MG: 20 INJECTION INTRAMUSCULAR; INTRAVENOUS at 01:11

## 2024-11-12 RX ADMIN — LORAZEPAM 0.5 MG: 0.5 TABLET ORAL at 11:11

## 2024-11-12 RX ADMIN — MONTELUKAST 10 MG: 10 TABLET, FILM COATED ORAL at 08:11

## 2024-11-12 RX ADMIN — FUROSEMIDE 80 MG: 10 INJECTION, SOLUTION INTRAVENOUS at 08:11

## 2024-11-12 RX ADMIN — CARVEDILOL 6.25 MG: 3.12 TABLET, FILM COATED ORAL at 08:11

## 2024-11-12 RX ADMIN — CARVEDILOL 6.25 MG: 3.12 TABLET, FILM COATED ORAL at 05:11

## 2024-11-12 RX ADMIN — ACETAMINOPHEN 650 MG: 325 TABLET ORAL at 04:11

## 2024-11-12 RX ADMIN — PANTOPRAZOLE SODIUM 40 MG: 40 TABLET, DELAYED RELEASE ORAL at 08:11

## 2024-11-12 RX ADMIN — LORAZEPAM 0.5 MG: 0.5 TABLET ORAL at 01:11

## 2024-11-12 RX ADMIN — RANOLAZINE 1000 MG: 500 TABLET, EXTENDED RELEASE ORAL at 09:11

## 2024-11-12 RX ADMIN — MAGNESIUM SULFATE 1 G: 500 INJECTION, SOLUTION INTRAMUSCULAR; INTRAVENOUS at 06:11

## 2024-11-12 RX ADMIN — ATORVASTATIN CALCIUM 10 MG: 10 TABLET, FILM COATED ORAL at 08:11

## 2024-11-12 RX ADMIN — PIPERACILLIN SODIUM AND TAZOBACTAM SODIUM 4.5 G: 4; .5 INJECTION, POWDER, FOR SOLUTION INTRAVENOUS at 05:11

## 2024-11-12 RX ADMIN — ISOSORBIDE MONONITRATE 60 MG: 60 TABLET, EXTENDED RELEASE ORAL at 08:11

## 2024-11-12 RX ADMIN — ACETAMINOPHEN 650 MG: 325 TABLET ORAL at 05:11

## 2024-11-12 RX ADMIN — DOXYCYCLINE 100 MG: 100 INJECTION, POWDER, LYOPHILIZED, FOR SOLUTION INTRAVENOUS at 02:11

## 2024-11-12 NOTE — PROGRESS NOTES
"[facility] - [facility]  Critical Care Medicine  Progress Note    Patient Name: Adriana Strong  MRN: [id number]  Admission Date: 11/10/2024  Hospital Length of Stay: 2 days  Code Status: Full Code  Attending Provider: Amarjit Pierre*  Primary Care Provider: Krzysztof Mcgraw MD   Principal Problem: Acute on chronic diastolic congestive heart failure    Subjective:     HPI:  Adriana Strong is a 79 y.o.F with hx of diastolic HF, Afib on elqiuis, s/p pacemaker, HLD, HTN, non obstructive CAD, s/p TAVR, asthma, COPD, on home oxygen (2L via [place]) who presented to the ED today with complains of worsening SOB. Hx as per  as patient is somnolent and on Bipap at time of interview. Generally she was doing fine in her usual state of health until Friday. She recently had a medication change, she was told to reduce lasix from BID to daily +prn w/ concerns of hyponatremia on OP labs. Denies recent illness. As per chart review: " patient reports compliance with medications, fluid restriction, Na restriction. Also endorsing dysuria for the last couple of days as well as decreased oral intake. Her only complaints today were worsening shortness of breath, weight gain. Denies fever, chills, chest pain, palpitations, abdominal pain, n/v/d, headaches, or any other symptoms at this time. "     In ED: HR 60, Aflutter, AFebrile, /85-->200/75-->150/68, 100% on Bipap. CBC with chronic, stable anemia. Na 128, K 5.6, otherwise CMP unremarkable. Mag 1.5. . Trop negative. TSH 2.559. POC lactate 1.96. UA with 3+ protein, 1+ leukocyte esterase, 17 WBCs, many bacteria, 23 squams. Culture pending. CXR with left pleural effusion and congestive change/edema, developing left lower lung zone consolidation not excluded in this hypoventilatory exam. S/p lasix 80mg inj x2 in ED. VBG PH 7.318 CO2 78.8 O2 94 HCO3 40.4.     Initially admitted to [facility] but stepped up to MICU for rescue bipap in the setting of flash pulmonary edema " and hypercapnic respiratory failure requiring Bipap    Hospital/ICU Course:  Presented for hypoxic respiratory failure and increased work of breathing requiring continued BiPap. Diuresed without improvement in her respiratory status. She underwent thoracentesis on 11/12/24 with 800 cc removed. Pending CT chest.    Interval History/Significant Events: No acute events overnight. She tolerated BiPap. Tolerated thoracentesis.       Objective:     Vital Signs (Most Recent):  Temp: 98.4 °F (36.9 °C) (11/12/24 1100)  Pulse: 60 (11/12/24 1200)  Resp: (!) 26 (11/12/24 1200)  BP: (!) 164/72 (11/12/24 1200)  SpO2: 99 % (11/12/24 1200) Vital Signs (24h Range):  Temp:  [97.5 °F (36.4 °C)-98.4 °F (36.9 °C)] 98.4 °F (36.9 °C)  Pulse:  [60-63] 60  Resp:  [18-40] 26  SpO2:  [92 %-100 %] 99 %  BP: (110-186)/(53-75) 164/72   Weight: 93 kg (205 lb 0.4 oz)  Body mass index is 35.19 kg/m².      Intake/Output Summary (Last 24 hours) at 11/12/2024 1323  Last data filed at 11/12/2024 1100  Gross per 24 hour   Intake 854.1 ml   Output 1755 ml   Net -900.9 ml          Physical Exam  HENT:      Head: Normocephalic.      Nose: Nose normal.   Eyes:      Pupils: Pupils are equal, round, and reactive to light.   Cardiovascular:      Rate and Rhythm: Normal rate.   Pulmonary:      Breath sounds: Rales present.   Abdominal:      General: Abdomen is flat.   Musculoskeletal:      Right lower leg: No edema.      Left lower leg: No edema.   Skin:     General: Skin is warm.      Capillary Refill: Capillary refill takes less than 2 seconds.   Neurological:      Mental Status: She is alert and oriented to person, place, and time.            Vents:  Oxygen Concentration (%): 24 (11/12/24 1200)  Lines/Drains/Airways       Drain  Duration                  Urethral Catheter 11/11/24 0127 Non-latex 18 Fr. 1 day              Peripheral Intravenous Line  Duration                  Peripheral IV - Single Lumen 11/10/24 1201 18 G Anterior;Left;Proximal Forearm 2  days         Peripheral IV - Single Lumen 11/11/24 0050 22 G No Anterior;Proximal;Right Upper Arm 1 day                  Significant Labs:    CBC/Anemia Profile:  Recent Labs   Lab 11/11/24  0344 11/11/24  0603 11/11/24  1223 11/12/24  0324   WBC 9.56  --   --  9.15   HGB 8.8*  --   --  9.2*   HCT 28.2* 29* 31* 28.3*     --   --  299   MCV 86  --   --  84   RDW 17.7*  --   --  17.6*        Chemistries:  Recent Labs   Lab 11/10/24  1454 11/11/24  0033 11/11/24  0344 11/11/24  0827 11/12/24  0006 11/12/24  0324 11/12/24  0842   *  127*   < > 131*   < > 132* 130* 131*   K 5.2*  --  4.9  --   --  3.9  --    CL 86*  --  85*  --   --  86*  --    CO2 30*  --  33*  --   --  34*  --    BUN 20  --  19  --   --  18  --    CREATININE 0.7  --  0.7  --   --  0.8  --    CALCIUM 9.6  --  8.9  --   --  8.6*  --    ALBUMIN  --   --  2.8*  --   --  2.7*  --    PROT  --   --  6.8  --   --  6.3  --    BILITOT  --   --  0.4  --   --  0.4  --    ALKPHOS  --   --  39*  --   --  41  --    ALT  --   --  16  --   --  16  --    AST  --   --  38  --   --  21  --    MG  --   --  1.5*  --   --  1.6  --    PHOS  --   --  3.7  --   --  2.4*  --     < > = values in this interval not displayed.       All pertinent labs within the past 24 hours have been reviewed.    Significant Imaging:  I have reviewed all pertinent imaging results/findings within the past 24 hours.    ABG  Recent Labs   Lab 11/11/24  1223   PH 7.388   PO2 91   PCO2 68.0*   HCO3 41.0*   BE 16*     Assessment/Plan:     Pulmonary  Pleural effusion  R>L. S/p thoracentesis. Fluid studies pending.     - CT chest pending    Acute on chronic respiratory failure with hypoxia  Patient with Hypercapnic and Hypoxic Respiratory failure which is Acute on chronic.  she is on home oxygen at 2 LPM. Supplemental oxygen was provided and noted- Oxygen Concentration (%):  [24-30] 24    Signs/symptoms of respiratory failure include- increased work of breathing. Contributing diagnoses  includes - CHF Labs and images were reviewed. Patient Has recent ABG, which has been reviewed. Will treat underlying causes and adjust management of respiratory failure as follows:    Suspect worsening respiratory 2/2 flash pulmonary edema in the setting of HTN and CHF exacerbation.      --Continue 80 mg lasix BID  --Continuous BiPAP as needed   --SpO2 goal 88-92% with hx COPD    COPD (chronic obstructive pulmonary disease)  --PRN duonebs    Cardiac/Vascular  * Acute on chronic diastolic congestive heart failure    Recent Labs     11/10/24  1200   *       Latest ECHO  Results for orders placed during the hospital encounter of 09/24/24    Echo      Left Ventricle: The left ventricle is normal in size. Normal wall thickness. There is normal systolic function. Ejection fraction is approximately 65%. There is normal diastolic function.    Right Ventricle: Normal right ventricular cavity size. Wall thickness is normal. Systolic function is normal. Pacemaker lead present in the ventricle.    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 It is reported to be a 26 mm Phan valve. The leaflets appear mildly thickened. Aortic valve area by VTI is 0.7 cm2. LVOT diameter is 1.9 cm. Aortic valve peak velocity is 3.3 m/s. Mean gradient is 33.3 mmHg. The dimensionless index is 0.26. Mild paravalvular regurgitation.    Mitral Valve: There is moderate mitral annular calcification present.    Tricuspid Valve: There is moderate regurgitation.    Pulmonary Artery: There is pulmonary hypertension. The estimated pulmonary artery systolic pressure is 62 mmHg.    IVC/SVC: Intermediate venous pressure at 8 mmHg.    Current Heart Failure Medications  carvediloL tablet 6.25 mg, 2 times daily with meals, Oral  spironolactone tablet 25 mg, Daily, Oral  furosemide injection 80 mg, 2 times daily, Intravenous  hydrALAZINE injection 10 mg, Every 6 hours PRN, Intravenous    --Monitor strict I&Os and daily  weights.    --Place on telemetry  --Low sodium diet        CAD (coronary artery disease)  --Continue home statin, plavix    Permanent atrial fibrillation  --Continue home eliquis  --Continuous cardiac monitoring  --EKG PRN  --Replete lytes goal K>4, Mag>2    Essential hypertension  --Continue home oral BP meds  --Volume removal with IV lasix    HLD (hyperlipidemia)  --Continue home statin    Renal/  Acute cystitis without hematuria  Reported dysuria with dark urine.  UA concerning for UTI.      --Enterococcus growing on cultures  --on zosyn     Oncology  Anemia  --Trend CBC daily  --Transfuse for Hgb <7 or active bleeding  --Type and screen    Endocrine  Hyponatremia  Likely 2/2 heart failure.  Stable.     --Trend BMP daily  --Lasix for volume removal   --Goal Na correction 4-6mEq in 24 hrs  --Follow up urine studies    Type 2 diabetes mellitus  --Accuchecks ACHS  --SSI, hypoglycemia protocol  --Inpatient BG goal 140-180    GI  Gastroesophageal reflux disease  --Continue PPI       Critical Care Daily Checklist:    A: Awake: RASS Goal/Actual Goal: RASS Goal: 0-->alert and calm  Actual:     B: Spontaneous Breathing Trial Performed?     C: SAT & SBT Coordinated?  -                      D: Delirium: CAM-ICU Overall CAM-ICU: Negative   E: Early Mobility Performed? Yes   F: Feeding Goal:    Status:     Current Diet Order   Procedures    Diet diabetic Low Chol/Sat Fat; 2000 Calories (up to 75 gm per meal); Fluid - 1500mL     Order Specific Question:   Additional Diet Options:     Answer:   Low Chol/Sat Fat     Order Specific Question:   Total calories / carbs:     Answer:   2000 Calories (up to 75 gm per meal)     Order Specific Question:   Fluid restriction:     Answer:   Fluid - 1500mL      AS: Analgesia/Sedation -   T: Thromboembolic Prophylaxis eliquis   H: HOB > 300 Yes   U: Stress Ulcer Prophylaxis (if needed) ppi   G: Glucose Control ldssi   B: Bowel Function     I: Indwelling Catheter (Lines & Sims) Necessity  whitney   D: De-escalation of Antimicrobials/Pharmacotherapies Zosyn/doxy    Plan for the day/ETD CT    Code Status:  Family/Goals of Care: Full Code         Critical secondary to Patient has a condition that poses threat to life and bodily function: Severe Respiratory Distress      Critical care was time spent personally by me on the following activities: development of treatment plan with patient or surrogate and bedside caregivers, discussions with consultants, evaluation of patient's response to treatment, examination of patient, ordering and performing treatments and interventions, ordering and review of laboratory studies, ordering and review of radiographic studies, pulse oximetry, re-evaluation of patient's condition. This critical care time did not overlap with that of any other provider or involve time for any procedures.     Tessa Low, DO  Critical Care Medicine  [facility] - Medical ICU

## 2024-11-12 NOTE — PLAN OF CARE
MICU DAILY GOALS     Family/Goals of care/Code Status   Code Status: Full Code    24H Vital Sign Range  Temp:  [96.9 °F (36.1 °C)-98.4 °F (36.9 °C)]   Pulse:  [60-63]   Resp:  [18-37]   BP: (110-204)/(53-75)   SpO2:  [92 %-100 %]      Shift Events (include procedures and significant events)   Patient on continuous BIPAP. Able to wean down FIO2. Patient with increased anxiety overnight and SOB. Medication given with relief of symptoms. Plan for Thoracentesis today.    AWAKE RASS: Goal -    Actual -      Restraint necessity: Not necessary   BREATHE SBT: Not intubated    Coordinate A & B, analgesics/sedatives Pain: managed   SAT: Not intubated   Delirium CAM-ICU: Overall CAM-ICU: Negative   Early(intubated/ Progressive (non-intubated) Mobility MOVE Screen (INTUBATED ONLY): Not intubated    Activity: Activity Management: Arm raise - L1, Rolling - L1   Feeding/Nutrition Diet order: Diet/Nutrition Received: consistent carb/diabetic diet, low saturated fat/low cholesterol,     Thrombus DVT prophylaxis: VTE Core Measure: Pharmacological prophylaxis initiated/maintained   HOB Elevation Head of Bed (HOB) Positioning: HOB at 30-45 degrees   Ulcer Prophylaxis GI: no   Glucose control managed Glycemic Management: blood glucose monitored   Skin Skin assessment:     Sacrum intact/not altered? No  Heels intact/not altered? Yes  Surgical wound? No    CHECK ONE!   (no altered skin or altered skin) and sub boxes:  [] No Altered Skin Integrity Present    []Prevention Measures Documented    [x] Altered Skin Integrity Present or Discovered   [x] LDA present in EPIC, daily doc completed              [] LDA added if not in EPIC (describe wound).                    When describing wound, do not stage, use descriptive words only.    [] Wound Image Taken (required on admit,                   transfer/discharge and every Tuesday)    Wound Care Consulted? No   Bowel Function constipation    Indwelling Catheter Necessity      Urethral Catheter  11/11/24 0127 Non-latex 18 Fr.-Reason for Continuing Urinary Catheterization: Critically ill in ICU and requiring hourly monitoring of intake/output       yes   De-escalation Antibiotics Yes        VS and assessment per flow sheet, patient progressing towards goals as tolerated, plan of care reviewed with family, all concerns addressed, will continue to monitor.

## 2024-11-12 NOTE — ASSESSMENT & PLAN NOTE
Patient with Hypercapnic and Hypoxic Respiratory failure which is Acute on chronic.  she is on home oxygen at 2 LPM. Supplemental oxygen was provided and noted- Oxygen Concentration (%):  [24-30] 24    Signs/symptoms of respiratory failure include- increased work of breathing. Contributing diagnoses includes - CHF Labs and images were reviewed. Patient Has recent ABG, which has been reviewed. Will treat underlying causes and adjust management of respiratory failure as follows:    Suspect worsening respiratory 2/2 flash pulmonary edema in the setting of HTN and CHF exacerbation.      --Continue 80 mg lasix BID  --Continuous BiPAP as needed   --SpO2 goal 88-92% with hx COPD

## 2024-11-12 NOTE — ASSESSMENT & PLAN NOTE
Recent Labs     11/10/24  1200   *       Latest ECHO  Results for orders placed during the hospital encounter of 09/24/24    Echo      Left Ventricle: The left ventricle is normal in size. Normal wall thickness. There is normal systolic function. Ejection fraction is approximately 65%. There is normal diastolic function.    Right Ventricle: Normal right ventricular cavity size. Wall thickness is normal. Systolic function is normal. Pacemaker lead present in the ventricle.    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 It is reported to be a 26 mm Phan valve. The leaflets appear mildly thickened. Aortic valve area by VTI is 0.7 cm2. LVOT diameter is 1.9 cm. Aortic valve peak velocity is 3.3 m/s. Mean gradient is 33.3 mmHg. The dimensionless index is 0.26. Mild paravalvular regurgitation.    Mitral Valve: There is moderate mitral annular calcification present.    Tricuspid Valve: There is moderate regurgitation.    Pulmonary Artery: There is pulmonary hypertension. The estimated pulmonary artery systolic pressure is 62 mmHg.    IVC/SVC: Intermediate venous pressure at 8 mmHg.    Current Heart Failure Medications  carvediloL tablet 6.25 mg, 2 times daily with meals, Oral  spironolactone tablet 25 mg, Daily, Oral  furosemide injection 80 mg, 2 times daily, Intravenous  hydrALAZINE injection 10 mg, Every 6 hours PRN, Intravenous    --Monitor strict I&Os and daily weights.    --Place on telemetry  --Low sodium diet

## 2024-11-12 NOTE — SUBJECTIVE & OBJECTIVE
Interval History/Significant Events: No acute events overnight. She tolerated BiPap. Tolerated thoracentesis.       Objective:     Vital Signs (Most Recent):  Temp: 98.4 °F (36.9 °C) (11/12/24 1100)  Pulse: 60 (11/12/24 1200)  Resp: (!) 26 (11/12/24 1200)  BP: (!) 164/72 (11/12/24 1200)  SpO2: 99 % (11/12/24 1200) Vital Signs (24h Range):  Temp:  [97.5 °F (36.4 °C)-98.4 °F (36.9 °C)] 98.4 °F (36.9 °C)  Pulse:  [60-63] 60  Resp:  [18-40] 26  SpO2:  [92 %-100 %] 99 %  BP: (110-186)/(53-75) 164/72   Weight: 93 kg (205 lb 0.4 oz)  Body mass index is 35.19 kg/m².      Intake/Output Summary (Last 24 hours) at 11/12/2024 1323  Last data filed at 11/12/2024 1100  Gross per 24 hour   Intake 854.1 ml   Output 1755 ml   Net -900.9 ml          Physical Exam  HENT:      Head: Normocephalic.      Nose: Nose normal.   Eyes:      Pupils: Pupils are equal, round, and reactive to light.   Cardiovascular:      Rate and Rhythm: Normal rate.   Pulmonary:      Breath sounds: Rales present.   Abdominal:      General: Abdomen is flat.   Musculoskeletal:      Right lower leg: No edema.      Left lower leg: No edema.   Skin:     General: Skin is warm.      Capillary Refill: Capillary refill takes less than 2 seconds.   Neurological:      Mental Status: She is alert and oriented to person, place, and time.            Vents:  Oxygen Concentration (%): 24 (11/12/24 1200)  Lines/Drains/Airways       Drain  Duration                  Urethral Catheter 11/11/24 0127 Non-latex 18 Fr. 1 day              Peripheral Intravenous Line  Duration                  Peripheral IV - Single Lumen 11/10/24 1201 18 G Anterior;Left;Proximal Forearm 2 days         Peripheral IV - Single Lumen 11/11/24 0050 22 G No Anterior;Proximal;Right Upper Arm 1 day                  Significant Labs:    CBC/Anemia Profile:  Recent Labs   Lab 11/11/24  0344 11/11/24  0603 11/11/24  1223 11/12/24  0324   WBC 9.56  --   --  9.15   HGB 8.8*  --   --  9.2*   HCT 28.2* 29* 31* 28.3*      --   --  299   MCV 86  --   --  84   RDW 17.7*  --   --  17.6*        Chemistries:  Recent Labs   Lab 11/10/24  1454 11/11/24  0033 11/11/24  0344 11/11/24  0827 11/12/24  0006 11/12/24  0324 11/12/24  0842   *  127*   < > 131*   < > 132* 130* 131*   K 5.2*  --  4.9  --   --  3.9  --    CL 86*  --  85*  --   --  86*  --    CO2 30*  --  33*  --   --  34*  --    BUN 20  --  19  --   --  18  --    CREATININE 0.7  --  0.7  --   --  0.8  --    CALCIUM 9.6  --  8.9  --   --  8.6*  --    ALBUMIN  --   --  2.8*  --   --  2.7*  --    PROT  --   --  6.8  --   --  6.3  --    BILITOT  --   --  0.4  --   --  0.4  --    ALKPHOS  --   --  39*  --   --  41  --    ALT  --   --  16  --   --  16  --    AST  --   --  38  --   --  21  --    MG  --   --  1.5*  --   --  1.6  --    PHOS  --   --  3.7  --   --  2.4*  --     < > = values in this interval not displayed.       All pertinent labs within the past 24 hours have been reviewed.    Significant Imaging:  I have reviewed all pertinent imaging results/findings within the past 24 hours.

## 2024-11-12 NOTE — PLAN OF CARE
[facility] - [facility]  Discharge Reassessment    Primary Care Provider: Krzysztof Mcgraw MD    Expected Discharge Date:     Reassessment (most recent)       Discharge Reassessment - 11/12/24 1508          Discharge Reassessment    Assessment Type Discharge Planning Reassessment     Did the patient's condition or plan change since previous assessment? No     Discharge Plan A Home Health;Skilled Nursing Facility     Discharge Plan B Home with family     DME Needed Upon Discharge  other (see comments)   TBD    Transition of Care Barriers None     Why the patient remains in the hospital Requires continued medical care        Post-Acute Status    Discharge Delays Change in Medical Condition                   Patient still requiring continuous BiPAP during the day. Patient requires continued medical care. CM team to follow.     Discharge Plan A and Plan B have been determined by review of patient's clinical status, future medical and therapeutic needs, and coverage/benefits for post-acute care in coordination with multidisciplinary team members.    CLOVER Hodgson  [facility]   Ext: [phone]

## 2024-11-12 NOTE — ASSESSMENT & PLAN NOTE
Likely 2/2 heart failure.  Stable.     --Trend BMP daily  --Lasix for volume removal   --Goal Na correction 4-6mEq in 24 hrs  --Follow up urine studies

## 2024-11-12 NOTE — HOSPITAL COURSE
Presented for hypoxic respiratory failure and increased work of breathing requiring continued BiPap. Diuresed without improvement in her respiratory status. She underwent thoracentesis on 11/12/24 with 800 cc removed. CT chest with mild edema, multiple small nodules. Will continue to diuresis to euvolemia. Tolerating Bipap qHS. Signifcantly anxious, despite home benzo q6 hours. Will add adjunct therapies as possible.  Restarted on home losartan with improved BP. Adding bowel reg. Likely SD to  11/14.

## 2024-11-12 NOTE — ASSESSMENT & PLAN NOTE
Reported dysuria with dark urine.  UA concerning for UTI.      --Enterococcus growing on cultures  --on zosyn

## 2024-11-12 NOTE — PROCEDURES
"Adriana Strong is a 79 y.o. female patient.    Temp: 98.3 °F (36.8 °C) (11/12/24 0701)  Pulse: 60 (11/12/24 0901)  Resp: (!) 27 (11/12/24 0901)  BP: 135/60 (11/12/24 0900)  SpO2: 96 % (11/12/24 0901)  Weight: 93 kg (205 lb 0.4 oz) (11/12/24 0400)  Height: 5' 4" (162.6 cm) (11/11/24 0930)       Thoracentesis    Date/Time: 11/12/2024 10:21 AM  Location procedure was performed: [facility] CR[facility]    Performed by: Trevon Rogers MD  Authorized by: Trevon Rogers MD  Assisting provider: Hany Maher MD  Consent Done: Yes  Consent: Written consent obtained.  Patient understanding: patient states understanding of the procedure being performed  Patient consent: the patient's understanding of the procedure matches consent given  Relevant documents: relevant documents present and verified  Procedure purpose: diagnostic and therapeutic  Indications: pleural effusion  Preparation: Patient was prepped and draped in the usual sterile fashion.  Local anesthesia used: yes    Anesthesia:  Local anesthesia used: yes  Local Anesthetic: lidocaine 1% without epinephrine  Anesthetic total: 7 mL    Patient sedated: no  Preparation: skin prepped with ChloraPrep  Patient position: sitting  Ultrasound guidance: yes  Location: right posterior  Intercostal space: 7th  Needle size: 18  Catheter size: 8 [place]  Number of attempts: 1  Drainage amount: 800 ml  Drainage characteristics: serous  Patient tolerance: Patient tolerated the procedure well with no immediate complications  Chest x-ray performed: yes  Chest x-ray interpreted by me.  Chest x-ray findings: normal findings  Complications: No  Estimated blood loss (mL): 1  Specimens: Yes (3 tubes)          11/12/2024    "

## 2024-11-12 NOTE — PLAN OF CARE
MICU DAILY GOALS     Family/Goals of care/Code Status   Code Status: Full Code    24H Vital Sign Range  Temp:  [96.9 °F (36.1 °C)-98.4 °F (36.9 °C)]   Pulse:  [60-62]   Resp:  [19-33]   BP: (118-221)/(55-86)   SpO2:  [92 %-100 %]      Shift Events (include procedures and significant events)   Pt remained on BIPAP throughout day; 2L NC O2 applied intermittently for meals; able to tolerate for approx 30 min-1hr;  Echo completed this afternoon;tolerated well; Plan of care: Thoracentesis tomorrow.    AWAKE RASS: Goal -    Actual -      Restraint necessity: Not necessary   BREATHE SBT: Not intubated    Coordinate A & B, analgesics/sedatives Pain: managed   SAT: Not intubated   Delirium CAM-ICU: Overall CAM-ICU: Negative   Early(intubated/ Progressive (non-intubated) Mobility MOVE Screen (INTUBATED ONLY): Not intubated    Activity: Activity Management: Rolling - L1   Feeding/Nutrition Diet order: Diet/Nutrition Received: consistent carb/diabetic diet,     Thrombus DVT prophylaxis: VTE Core Measure: Pharmacological prophylaxis initiated/maintained   HOB Elevation Head of Bed (HOB) Positioning: HOB elevated   Ulcer Prophylaxis GI: yes   Glucose control managed Glycemic Management: blood glucose monitored   Skin Skin assessment:     Sacrum intact/not altered? No  Heels intact/not altered? Yes  Surgical wound? No    CHECK ONE!   (no altered skin or altered skin) and sub boxes:  [] No Altered Skin Integrity Present    []Prevention Measures Documented    [x] Altered Skin Integrity Present or Discovered   [x] LDA present in EPIC, daily doc completed              [] LDA added if not in EPIC (describe wound).                    When describing wound, do not stage, use descriptive words only.    [] Wound Image Taken (required on admit,                   transfer/discharge and every Tuesday)    Wound Care Consulted? No   Bowel Function no issues    Indwelling Catheter Necessity      Urethral Catheter 11/11/24 0127 Non-latex 18  Fr.-Reason for Continuing Urinary Catheterization: Critically ill in ICU and requiring hourly monitoring of intake/output       YES   De-escalation Antibiotics Yes        VS and assessment per flow sheet, patient progressing towards goals as tolerated, plan of care reviewed with Adriana Strong/family, all concerns addressed, will continue to monitor.

## 2024-11-13 LAB
ACID FAST MOD KINY STN SPEC: NORMAL
ALBUMIN SERPL BCP-MCNC: 2.5 G/DL (ref 3.5–5.2)
ALP SERPL-CCNC: 38 U/L (ref 40–150)
ALT SERPL W/O P-5'-P-CCNC: 14 U/L (ref 10–44)
ANION GAP SERPL CALC-SCNC: 10 MMOL/L (ref 8–16)
ANION GAP SERPL CALC-SCNC: 10 MMOL/L (ref 8–16)
ANION GAP SERPL CALC-SCNC: 12 MMOL/L (ref 8–16)
AST SERPL-CCNC: 17 U/L (ref 10–40)
BASOPHILS # BLD AUTO: 0.04 K/UL (ref 0–0.2)
BASOPHILS NFR BLD: 0.6 % (ref 0–1.9)
BILIRUB SERPL-MCNC: 0.4 MG/DL (ref 0.1–1)
BUN SERPL-MCNC: 16 MG/DL (ref 8–23)
BUN SERPL-MCNC: 17 MG/DL (ref 8–23)
BUN SERPL-MCNC: 18 MG/DL (ref 8–23)
CALCIUM SERPL-MCNC: 8.3 MG/DL (ref 8.7–10.5)
CALCIUM SERPL-MCNC: 8.4 MG/DL (ref 8.7–10.5)
CALCIUM SERPL-MCNC: 8.6 MG/DL (ref 8.7–10.5)
CHLORIDE SERPL-SCNC: 84 MMOL/L (ref 95–110)
CHLORIDE SERPL-SCNC: 85 MMOL/L (ref 95–110)
CHLORIDE SERPL-SCNC: 85 MMOL/L (ref 95–110)
CO2 SERPL-SCNC: 34 MMOL/L (ref 23–29)
CO2 SERPL-SCNC: 36 MMOL/L (ref 23–29)
CO2 SERPL-SCNC: 37 MMOL/L (ref 23–29)
CREAT SERPL-MCNC: 0.8 MG/DL (ref 0.5–1.4)
CRP SERPL-MCNC: 46.8 MG/L (ref 0–8.2)
DIFFERENTIAL METHOD BLD: ABNORMAL
EOSINOPHIL # BLD AUTO: 0 K/UL (ref 0–0.5)
EOSINOPHIL NFR BLD: 0.4 % (ref 0–8)
ERYTHROCYTE [DISTWIDTH] IN BLOOD BY AUTOMATED COUNT: 18.1 % (ref 11.5–14.5)
EST. GFR  (NO RACE VARIABLE): >60 ML/MIN/1.73 M^2
GLUCOSE SERPL-MCNC: 105 MG/DL (ref 70–110)
GLUCOSE SERPL-MCNC: 125 MG/DL (ref 70–110)
GLUCOSE SERPL-MCNC: 188 MG/DL (ref 70–110)
HCT VFR BLD AUTO: 27.4 % (ref 37–48.5)
HGB BLD-MCNC: 8.6 G/DL (ref 12–16)
IMM GRANULOCYTES # BLD AUTO: 0.04 K/UL (ref 0–0.04)
IMM GRANULOCYTES NFR BLD AUTO: 0.6 % (ref 0–0.5)
LYMPHOCYTES # BLD AUTO: 0.9 K/UL (ref 1–4.8)
LYMPHOCYTES NFR BLD: 12.2 % (ref 18–48)
MAGNESIUM SERPL-MCNC: 1.7 MG/DL (ref 1.6–2.6)
MCH RBC QN AUTO: 26.9 PG (ref 27–31)
MCHC RBC AUTO-ENTMCNC: 31.4 G/DL (ref 32–36)
MCV RBC AUTO: 86 FL (ref 82–98)
MONOCYTES # BLD AUTO: 0.9 K/UL (ref 0.3–1)
MONOCYTES NFR BLD: 12.2 % (ref 4–15)
MYCOBACTERIUM SPEC QL CULT: NORMAL
NEUTROPHILS # BLD AUTO: 5.3 K/UL (ref 1.8–7.7)
NEUTROPHILS NFR BLD: 74 % (ref 38–73)
NRBC BLD-RTO: 0 /100 WBC
OHS QRS DURATION: 104 MS
OHS QTC CALCULATION: 488 MS
PHOSPHATE SERPL-MCNC: 3.6 MG/DL (ref 2.7–4.5)
PLATELET # BLD AUTO: 294 K/UL (ref 150–450)
PMV BLD AUTO: 9.4 FL (ref 9.2–12.9)
POCT GLUCOSE: 127 MG/DL (ref 70–110)
POCT GLUCOSE: 138 MG/DL (ref 70–110)
POCT GLUCOSE: 187 MG/DL (ref 70–110)
POTASSIUM SERPL-SCNC: 3.7 MMOL/L (ref 3.5–5.1)
POTASSIUM SERPL-SCNC: 3.8 MMOL/L (ref 3.5–5.1)
POTASSIUM SERPL-SCNC: 3.8 MMOL/L (ref 3.5–5.1)
PROT SERPL-MCNC: 5.9 G/DL (ref 6–8.4)
RBC # BLD AUTO: 3.2 M/UL (ref 4–5.4)
SODIUM SERPL-SCNC: 130 MMOL/L (ref 136–145)
SODIUM SERPL-SCNC: 131 MMOL/L (ref 136–145)
SODIUM SERPL-SCNC: 132 MMOL/L (ref 136–145)
SODIUM SERPL-SCNC: 132 MMOL/L (ref 136–145)
WBC # BLD AUTO: 7.11 K/UL (ref 3.9–12.7)

## 2024-11-13 PROCEDURE — 85025 COMPLETE CBC W/AUTO DIFF WBC: CPT

## 2024-11-13 PROCEDURE — 25000003 PHARM REV CODE 250

## 2024-11-13 PROCEDURE — 93005 ELECTROCARDIOGRAM TRACING: CPT

## 2024-11-13 PROCEDURE — 63600175 PHARM REV CODE 636 W HCPCS

## 2024-11-13 PROCEDURE — 80053 COMPREHEN METABOLIC PANEL: CPT

## 2024-11-13 PROCEDURE — 27100171 HC OXYGEN HIGH FLOW UP TO 24 HOURS

## 2024-11-13 PROCEDURE — 99900035 HC TECH TIME PER 15 MIN (STAT)

## 2024-11-13 PROCEDURE — 94761 N-INVAS EAR/PLS OXIMETRY MLT: CPT

## 2024-11-13 PROCEDURE — 25000003 PHARM REV CODE 250: Performed by: INTERNAL MEDICINE

## 2024-11-13 PROCEDURE — 27000221 HC OXYGEN, UP TO 24 HOURS

## 2024-11-13 PROCEDURE — 20000000 HC ICU ROOM

## 2024-11-13 PROCEDURE — 25000003 PHARM REV CODE 250: Performed by: STUDENT IN AN ORGANIZED HEALTH CARE EDUCATION/TRAINING PROGRAM

## 2024-11-13 PROCEDURE — 85652 RBC SED RATE AUTOMATED: CPT

## 2024-11-13 PROCEDURE — 99233 SBSQ HOSP IP/OBS HIGH 50: CPT | Mod: GC,,, | Performed by: INTERNAL MEDICINE

## 2024-11-13 PROCEDURE — 84295 ASSAY OF SERUM SODIUM: CPT

## 2024-11-13 PROCEDURE — 93010 ELECTROCARDIOGRAM REPORT: CPT | Mod: ,,, | Performed by: INTERNAL MEDICINE

## 2024-11-13 PROCEDURE — 84100 ASSAY OF PHOSPHORUS: CPT

## 2024-11-13 PROCEDURE — 63600175 PHARM REV CODE 636 W HCPCS: Performed by: STUDENT IN AN ORGANIZED HEALTH CARE EDUCATION/TRAINING PROGRAM

## 2024-11-13 PROCEDURE — 86140 C-REACTIVE PROTEIN: CPT

## 2024-11-13 PROCEDURE — 80048 BASIC METABOLIC PNL TOTAL CA: CPT | Mod: 91,XB | Performed by: STUDENT IN AN ORGANIZED HEALTH CARE EDUCATION/TRAINING PROGRAM

## 2024-11-13 PROCEDURE — 83735 ASSAY OF MAGNESIUM: CPT

## 2024-11-13 PROCEDURE — 94660 CPAP INITIATION&MGMT: CPT

## 2024-11-13 PROCEDURE — 87449 NOS EACH ORGANISM AG IA: CPT

## 2024-11-13 RX ORDER — LOSARTAN POTASSIUM 50 MG/1
50 TABLET ORAL DAILY
Status: DISCONTINUED | OUTPATIENT
Start: 2024-11-13 | End: 2024-11-23 | Stop reason: HOSPADM

## 2024-11-13 RX ORDER — PROPOFOL 10 MG/ML
INJECTION, EMULSION INTRAVENOUS
Status: DISPENSED
Start: 2024-11-13 | End: 2024-11-14

## 2024-11-13 RX ORDER — CARVEDILOL 12.5 MG/1
12.5 TABLET ORAL 2 TIMES DAILY WITH MEALS
Status: DISCONTINUED | OUTPATIENT
Start: 2024-11-14 | End: 2024-11-23 | Stop reason: HOSPADM

## 2024-11-13 RX ORDER — DULOXETIN HYDROCHLORIDE 20 MG/1
20 CAPSULE, DELAYED RELEASE ORAL DAILY
Status: DISCONTINUED | OUTPATIENT
Start: 2024-11-13 | End: 2024-11-23 | Stop reason: HOSPADM

## 2024-11-13 RX ORDER — BALSAM PERU/CASTOR OIL
OINTMENT (GRAM) TOPICAL 2 TIMES DAILY
Status: DISCONTINUED | OUTPATIENT
Start: 2024-11-13 | End: 2024-11-23 | Stop reason: HOSPADM

## 2024-11-13 RX ORDER — MAGNESIUM SULFATE 1 G/100ML
1 INJECTION INTRAVENOUS ONCE
Status: COMPLETED | OUTPATIENT
Start: 2024-11-13 | End: 2024-11-13

## 2024-11-13 RX ORDER — HYDROXYZINE HYDROCHLORIDE 25 MG/1
25 TABLET, FILM COATED ORAL 3 TIMES DAILY PRN
Status: DISCONTINUED | OUTPATIENT
Start: 2024-11-13 | End: 2024-11-23 | Stop reason: HOSPADM

## 2024-11-13 RX ADMIN — CARVEDILOL 6.25 MG: 3.12 TABLET, FILM COATED ORAL at 08:11

## 2024-11-13 RX ADMIN — CARVEDILOL 6.25 MG: 3.12 TABLET, FILM COATED ORAL at 05:11

## 2024-11-13 RX ADMIN — APIXABAN 5 MG: 5 TABLET, FILM COATED ORAL at 09:11

## 2024-11-13 RX ADMIN — RANOLAZINE 1000 MG: 500 TABLET, EXTENDED RELEASE ORAL at 08:11

## 2024-11-13 RX ADMIN — FUROSEMIDE 80 MG: 10 INJECTION, SOLUTION INTRAVENOUS at 05:11

## 2024-11-13 RX ADMIN — FUROSEMIDE 80 MG: 10 INJECTION, SOLUTION INTRAVENOUS at 08:11

## 2024-11-13 RX ADMIN — MAGNESIUM SULFATE 1 G: 500 INJECTION, SOLUTION INTRAMUSCULAR; INTRAVENOUS at 06:11

## 2024-11-13 RX ADMIN — Medication: at 02:11

## 2024-11-13 RX ADMIN — Medication: at 09:11

## 2024-11-13 RX ADMIN — LORAZEPAM 0.5 MG: 0.5 TABLET ORAL at 05:11

## 2024-11-13 RX ADMIN — CLOPIDOGREL BISULFATE 75 MG: 75 TABLET ORAL at 08:11

## 2024-11-13 RX ADMIN — PIPERACILLIN SODIUM AND TAZOBACTAM SODIUM 4.5 G: 4; .5 INJECTION, POWDER, FOR SOLUTION INTRAVENOUS at 12:11

## 2024-11-13 RX ADMIN — RANOLAZINE 1000 MG: 500 TABLET, EXTENDED RELEASE ORAL at 09:11

## 2024-11-13 RX ADMIN — HYDROXYZINE HYDROCHLORIDE 25 MG: 25 TABLET, FILM COATED ORAL at 02:11

## 2024-11-13 RX ADMIN — PANTOPRAZOLE SODIUM 40 MG: 40 TABLET, DELAYED RELEASE ORAL at 08:11

## 2024-11-13 RX ADMIN — OXYCODONE HYDROCHLORIDE AND ACETAMINOPHEN 1000 MG: 500 TABLET ORAL at 08:11

## 2024-11-13 RX ADMIN — APIXABAN 5 MG: 5 TABLET, FILM COATED ORAL at 08:11

## 2024-11-13 RX ADMIN — ATORVASTATIN CALCIUM 10 MG: 10 TABLET, FILM COATED ORAL at 08:11

## 2024-11-13 RX ADMIN — SPIRONOLACTONE 25 MG: 25 TABLET ORAL at 08:11

## 2024-11-13 RX ADMIN — LOSARTAN POTASSIUM 50 MG: 50 TABLET, FILM COATED ORAL at 09:11

## 2024-11-13 RX ADMIN — HYDRALAZINE HYDROCHLORIDE 10 MG: 20 INJECTION INTRAMUSCULAR; INTRAVENOUS at 09:11

## 2024-11-13 RX ADMIN — DOXYCYCLINE 100 MG: 100 INJECTION, POWDER, LYOPHILIZED, FOR SOLUTION INTRAVENOUS at 02:11

## 2024-11-13 RX ADMIN — DULOXETINE HYDROCHLORIDE 20 MG: 20 CAPSULE, DELAYED RELEASE ORAL at 12:11

## 2024-11-13 RX ADMIN — GABAPENTIN 300 MG: 300 CAPSULE ORAL at 09:11

## 2024-11-13 RX ADMIN — WHITE PETROLATUM: 1.75 OINTMENT TOPICAL at 09:11

## 2024-11-13 RX ADMIN — ISOSORBIDE MONONITRATE 60 MG: 60 TABLET, EXTENDED RELEASE ORAL at 08:11

## 2024-11-13 RX ADMIN — LORAZEPAM 0.5 MG: 0.5 TABLET ORAL at 12:11

## 2024-11-13 RX ADMIN — PIPERACILLIN SODIUM AND TAZOBACTAM SODIUM 4.5 G: 4; .5 INJECTION, POWDER, FOR SOLUTION INTRAVENOUS at 05:11

## 2024-11-13 RX ADMIN — PIPERACILLIN SODIUM AND TAZOBACTAM SODIUM 4.5 G: 4; .5 INJECTION, POWDER, FOR SOLUTION INTRAVENOUS at 09:11

## 2024-11-13 RX ADMIN — HYDRALAZINE HYDROCHLORIDE 10 MG: 20 INJECTION INTRAMUSCULAR; INTRAVENOUS at 05:11

## 2024-11-13 RX ADMIN — DOXYCYCLINE 100 MG: 100 INJECTION, POWDER, LYOPHILIZED, FOR SOLUTION INTRAVENOUS at 12:11

## 2024-11-13 RX ADMIN — MONTELUKAST 10 MG: 10 TABLET, FILM COATED ORAL at 08:11

## 2024-11-13 NOTE — ASSESSMENT & PLAN NOTE
Patient with Hypercapnic and Hypoxic Respiratory failure which is Acute on chronic.  she is on home oxygen at 2 LPM. Supplemental oxygen was provided and noted- Oxygen Concentration (%):  [24-26] 24    Signs/symptoms of respiratory failure include- increased work of breathing. Contributing diagnoses includes - CHF Labs and images were reviewed. Patient Has recent ABG, which has been reviewed. Will treat underlying causes and adjust management of respiratory failure as follows:    Suspect worsening respiratory 2/2 flash pulmonary edema in the setting of HTN and CHF exacerbation.      --Continue 80 mg lasix BID  --Continuous BiPAP as needed   --SpO2 goal 88-92% with hx COPD  --Continue supportive anxyolytic

## 2024-11-13 NOTE — ASSESSMENT & PLAN NOTE
"  No results for input(s): "BNP" in the last 72 hours.    Latest ECHO  Results for orders placed during the hospital encounter of 09/24/24    Echo      Left Ventricle: The left ventricle is normal in size. Normal wall thickness. There is normal systolic function. Ejection fraction is approximately 65%. There is normal diastolic function.    Right Ventricle: Normal right ventricular cavity size. Wall thickness is normal. Systolic function is normal. Pacemaker lead present in the ventricle.    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 It is reported to be a 26 mm Phan valve. The leaflets appear mildly thickened. Aortic valve area by VTI is 0.7 cm2. LVOT diameter is 1.9 cm. Aortic valve peak velocity is 3.3 m/s. Mean gradient is 33.3 mmHg. The dimensionless index is 0.26. Mild paravalvular regurgitation.    Mitral Valve: There is moderate mitral annular calcification present.    Tricuspid Valve: There is moderate regurgitation.    Pulmonary Artery: There is pulmonary hypertension. The estimated pulmonary artery systolic pressure is 62 mmHg.    IVC/SVC: Intermediate venous pressure at 8 mmHg.    Current Heart Failure Medications  carvediloL tablet 6.25 mg, 2 times daily with meals, Oral  spironolactone tablet 25 mg, Daily, Oral  furosemide injection 80 mg, 2 times daily, Intravenous  hydrALAZINE injection 10 mg, Every 6 hours PRN, Intravenous    --Monitor strict I&Os and daily weights.    --Place on telemetry  --Low sodium diet  -- Diuresis to Euvolemia      "

## 2024-11-13 NOTE — CARE UPDATE
Critical Care Medicine Attending    79 y.o.F with hx of diastolic HF, Afib on elqiuis, s/p pacemaker, HLD, HTN, non obstructive CAD, s/p TAVR, asthma, COPD, on home oxygen (2L via NC) who presented to the ED with complains of worsening SOB. Noted to be markedly volume overloaded with large Right pleural effusion. Plan to continue to diureses and obtain thoracentesis.     Amarjit Pierre MD  Critical Care Medicine  [facility]

## 2024-11-13 NOTE — SUBJECTIVE & OBJECTIVE
Interval History/Significant Events: No Acute Overnight Events. Patient is afebrile and hemodynamically stable. Tolerating Bipap well qhs. Will continue to diuresis to euvolemia. Anxious despite home benzo q6hr. Will add adjunct.     CT chest yesterday shows GOO; Tolerated throa well with no acute complications.   DVT study negative.     Review of Systems   Constitutional:  Positive for fatigue. Negative for activity change, chills and fever.   HENT:  Negative for trouble swallowing.    Eyes:  Negative for photophobia and visual disturbance.   Respiratory:  Positive for shortness of breath. Negative for cough and chest tightness.    Cardiovascular:  Positive for leg swelling. Negative for chest pain and palpitations.   Gastrointestinal:  Negative for abdominal pain, constipation, diarrhea, nausea and vomiting.   Genitourinary:  Positive for dysuria and frequency. Negative for hematuria.   Musculoskeletal:  Negative for back pain, gait problem and neck pain.   Skin:  Negative for rash and wound.   Neurological:  Negative for dizziness, syncope, speech difficulty and light-headedness.   Psychiatric/Behavioral:  Negative for agitation and confusion. The patient is not nervous/anxious.      Objective:     Vital Signs (Most Recent):  Temp: 98 °F (36.7 °C) (11/13/24 0701)  Pulse: 60 (11/13/24 0901)  Resp: (!) 30 (11/13/24 0901)  BP: 135/60 (11/13/24 0900)  SpO2: 100 % (11/13/24 0901) Vital Signs (24h Range):  Temp:  [97.5 °F (36.4 °C)-98.4 °F (36.9 °C)] 98 °F (36.7 °C)  Pulse:  [60] 60  Resp:  [16-32] 30  SpO2:  [95 %-100 %] 100 %  BP: (127-177)/() 135/60   Weight: 93.5 kg (206 lb 2.1 oz)  Body mass index is 35.38 kg/m².      Intake/Output Summary (Last 24 hours) at 11/13/2024 0938  Last data filed at 11/13/2024 0900  Gross per 24 hour   Intake 1186.48 ml   Output 1800 ml   Net -613.52 ml          Physical Exam  HENT:      Head: Normocephalic.      Nose: Nose normal.   Eyes:      Extraocular Movements: Extraocular  movements intact.   Cardiovascular:      Rate and Rhythm: Normal rate.      Pulses: Normal pulses.   Pulmonary:      Effort: Pulmonary effort is normal. No respiratory distress.   Abdominal:      General: Abdomen is flat.   Musculoskeletal:      Cervical back: Normal range of motion.      Right lower leg: Edema present.      Left lower leg: Edema present.   Skin:     General: Skin is warm.      Capillary Refill: Capillary refill takes less than 2 seconds.   Neurological:      General: No focal deficit present.      Mental Status: She is alert and oriented to person, place, and time. Mental status is at baseline.   Psychiatric:         Mood and Affect: Mood normal.         Behavior: Behavior normal.         Thought Content: Thought content normal.         Judgment: Judgment normal.            Vents:  Oxygen Concentration (%): 24 (11/12/24 2200)  Lines/Drains/Airways       Drain  Duration                  Urethral Catheter 11/11/24 0127 Non-latex 18 Fr. 2 days              Peripheral Intravenous Line  Duration                  Peripheral IV - Single Lumen 11/10/24 1201 18 G Anterior;Left;Proximal Forearm 2 days         Peripheral IV - Single Lumen 11/11/24 0050 22 G No Anterior;Proximal;Right Upper Arm 2 days                  Significant Labs:    CBC/Anemia Profile:  Recent Labs   Lab 11/11/24  1223 11/12/24  0324 11/13/24  0344   WBC  --  9.15 7.11   HGB  --  9.2* 8.6*   HCT 31* 28.3* 27.4*   PLT  --  299 294   MCV  --  84 86   RDW  --  17.6* 18.1*        Chemistries:  Recent Labs   Lab 11/12/24  0324 11/12/24  0842 11/12/24 2007 11/12/24  2350 11/13/24  0344   *   < > 132* 130* 131*   K 3.9  --  3.9  --  3.8   CL 86*  --  83*  --  85*   CO2 34*  --  36*  --  36*   BUN 18  --  17  --  18   CREATININE 0.8  --  0.8  --  0.8   CALCIUM 8.6*  --  8.5*  --  8.4*   ALBUMIN 2.7*  --   --   --  2.5*   PROT 6.3  --   --   --  5.9*   BILITOT 0.4  --   --   --  0.4   ALKPHOS 41  --   --   --  38*   ALT 16  --   --   --   14   AST 21  --   --   --  17   MG 1.6  --   --   --  1.7   PHOS 2.4*  --   --   --  3.6    < > = values in this interval not displayed.       All pertinent labs within the past 24 hours have been reviewed.    Significant Imaging:  I have reviewed all pertinent imaging results/findings within the past 24 hours.

## 2024-11-13 NOTE — ASSESSMENT & PLAN NOTE
Reported dysuria with dark urine.  UA concerning for UTI.      --Enterococcus growing on cultures  --on zosyn; Continue Abx for 5-7 days

## 2024-11-13 NOTE — PLAN OF CARE
SW assigned to pt's care team. SW will continue to follow for d/c needs.       Sherie Ascencio LMSW  - [facility]

## 2024-11-13 NOTE — PLAN OF CARE
MICU DAILY GOALS     Family/Goals of care/Code Status   Code Status: Full Code    24H Vital Sign Range  Temp:  [97.5 °F (36.4 °C)-98.4 °F (36.9 °C)]   Pulse:  [60]   Resp:  [16-40]   BP: (127-185)/()   SpO2:  [95 %-100 %]      Shift Events (include procedures and significant events)   Patient on BIPAP overnight for a couple hours. Complained of anxiety overnight, PRN medication given with relief of symptoms. Continue to diuresis and monitor output.     AWAKE RASS: Goal - RASS Goal: 0-->alert and calm  Actual - RASS (Mckeon Agitation-Sedation Scale): alert and calm    Restraint necessity: Not necessary   BREATHE SBT: Not intubated    Coordinate A & B, analgesics/sedatives Pain: managed   SAT: Not intubated   Delirium CAM-ICU: Overall CAM-ICU: Negative   Early(intubated/ Progressive (non-intubated) Mobility MOVE Screen (INTUBATED ONLY): Not intubated    Activity: Activity Management: Arm raise - L1, Rolling - L1   Feeding/Nutrition Diet order: Diet/Nutrition Received: consistent carb/diabetic diet, low saturated fat/low cholesterol,     Thrombus DVT prophylaxis: VTE Core Measure: Pharmacological prophylaxis initiated/maintained   HOB Elevation Head of Bed (HOB) Positioning: HOB at 30-45 degrees   Ulcer Prophylaxis GI: yes   Glucose control managed Glycemic Management: blood glucose monitored   Skin Skin assessment:     Sacrum intact/not altered? No  Heels intact/not altered? Yes  Surgical wound? No    CHECK ONE!   (no altered skin or altered skin) and sub boxes:  [] No Altered Skin Integrity Present    []Prevention Measures Documented    [x] Altered Skin Integrity Present or Discovered   [x] LDA present in EPIC, daily doc completed              [] LDA added if not in EPIC (describe wound).                    When describing wound, do not stage, use descriptive words only.    [] Wound Image Taken (required on admit,                   transfer/discharge and every Tuesday)    Wound Care Consulted? No   Bowel  Function constipation    Indwelling Catheter Necessity      Urethral Catheter 11/11/24 0127 Non-latex 18 Fr.-Reason for Continuing Urinary Catheterization: Critically ill in ICU and requiring hourly monitoring of intake/output       yes   De-escalation Antibiotics Yes        VS and assessment per flow sheet, patient progressing towards goals as tolerated, plan of care reviewed with family, all concerns addressed, will continue to monitor.

## 2024-11-13 NOTE — ASSESSMENT & PLAN NOTE
R>L. S/p thoracentesis. Fluid studies with transudate process. No culture results yet     - CT chest w/ GOO, improved effusion.

## 2024-11-13 NOTE — PLAN OF CARE
MICU DAILY GOALS     Family/Goals of care/Code Status   Code Status: Full Code    24H Vital Sign Range  Temp:  [97.8 °F (36.6 °C)-98.4 °F (36.9 °C)]   Pulse:  [60-63]   Resp:  [17-40]   BP: (110-186)/(53-76)   SpO2:  [92 %-100 %]      Shift Events (include procedures and significant events)   No acute events throughout shift PRN given for HTN/anxiety. CT Chest, Chest xray, US bilateral extremities, thoracentesis (800 ml removed).     AWAKE RASS: Goal - RASS Goal: 0-->alert and calm  Actual - RASS (Mckeon Agitation-Sedation Scale): alert and calm    Restraint necessity: Not necessary   BREATHE SBT: Not intubated    Coordinate A & B, analgesics/sedatives Pain: managed   SAT: Not intubated   Delirium CAM-ICU: Overall CAM-ICU: Negative   Early(intubated/ Progressive (non-intubated) Mobility MOVE Screen (INTUBATED ONLY): Not intubated    Activity: Activity Management: Arm raise - L1, Rolling - L1   Feeding/Nutrition Diet order: Diet/Nutrition Received: consistent carb/diabetic diet, low saturated fat/low cholesterol,     Thrombus DVT prophylaxis: VTE Core Measure: Pharmacological prophylaxis initiated/maintained   HOB Elevation Head of Bed (HOB) Positioning: HOB at 30-45 degrees   Ulcer Prophylaxis GI: no   Glucose control managed Glycemic Management: blood glucose monitored   Skin Skin assessment:     Sacrum intact/not altered? Yes  Heels intact/not altered? Yes  Surgical wound? No    CHECK ONE!   (no altered skin or altered skin) and sub boxes:  [x] No Altered Skin Integrity Present    [x]Prevention Measures Documented    [] Altered Skin Integrity Present or Discovered   [] LDA present in EPIC, daily doc completed              [] LDA added if not in EPIC (describe wound).                    When describing wound, do not stage, use descriptive words only.    [] Wound Image Taken (required on admit,                   transfer/discharge and every Tuesday)    Wound Care Consulted? No   Bowel Function no issues     Indwelling Catheter Necessity      Urethral Catheter 11/11/24 0127 Non-latex 18 Fr.-Reason for Continuing Urinary Catheterization: Critically ill in ICU and requiring hourly monitoring of intake/output          De-escalation Antibiotics Yes        VS and assessment per flow sheet, patient progressing towards goals as tolerated, plan of care reviewed with  patient and family , all concerns addressed, will continue to monitor.

## 2024-11-13 NOTE — CONSULTS
[facility] - Medical ICU  Wound Care    Patient Name:  Adriana Strong   MRN:  [id number]  Date: 11/13/2024  Diagnosis: Acute on chronic diastolic congestive heart failure    History:     Past Medical History:   Diagnosis Date    Acute on chronic diastolic (congestive) heart failure 11/20/2019    Anticoagulant long-term use     on Plavix since May 2015    Anxiety     Arthritis     Asthma     Atrial fibrillation     Atrial fibrillation with RVR 10/19/2020    Cataracts, bilateral     Chest pain, atypical     Chronic atrial fibrillation with rapid ventricular response 06/23/2017    Colon polyp     Coronary artery disease     Diabetes mellitus     Dry eyes     Esophageal erosions     General anesthetics causing adverse effect in therapeutic use     GERD (gastroesophageal reflux disease)     Heart murmur     Hemorrhoid     High cholesterol     Hypertension     Irritable bowel syndrome     Paroxysmal atrial fibrillation 10/30/2020    Persistent atrial fibrillation 02/10/2020    Shingles 2015    Stenosis of aortic and mitral valves     Stroke     TIA (transient ischemic attack)     Use of cane as ambulatory aid        Social History     Socioeconomic History    Marital status:    Tobacco Use    Smoking status: Never    Smokeless tobacco: Never   Substance and Sexual Activity    Alcohol use: No    Drug use: Never    Sexual activity: Not Currently     Partners: Male     Social Drivers of Health     Financial Resource Strain: Low Risk  (11/10/2024)    Overall Financial Resource Strain (CARDIA)     Difficulty of Paying Living Expenses: Not hard at all   Food Insecurity: No Food Insecurity (11/10/2024)    Hunger Vital Sign     Worried About Running Out of Food in the Last Year: Never true     Ran Out of Food in the Last Year: Never true   Transportation Needs: No Transportation Needs (11/10/2024)    TRANSPORTATION NEEDS     Transportation : No   Physical Activity: Inactive (11/10/2024)    Exercise Vital Sign     Days of  Exercise per Week: 0 days     Minutes of Exercise per Session: 0 min   Stress: Patient Declined (11/10/2024)    [employer] - Occupational Stress Questionnaire     Feeling of Stress : Patient declined   Housing Stability: Low Risk  (11/10/2024)    Housing Stability Vital Sign     Unable to Pay for Housing in the Last Year: No     Homeless in the Last Year: No       Precautions:     Allergies as of 11/10/2024 - Reviewed 11/10/2024   Allergen Reaction Noted    Celebrex [celecoxib] Shortness Of Breath 11/23/2014    Ciprofloxacin Swelling 02/07/2018    Dicyclomine Hives 04/02/2015    Adhesive Dermatitis 02/22/2016    Avelox [moxifloxacin] Swelling 11/23/2014    Cilostazol Other (See Comments) 11/23/2014    Eryc [erythromycin] Other (See Comments) 11/07/2015    Iodine and iodide containing products Hives 11/23/2014    Keflex [cephalexin] Hives 11/23/2014    Meclomen  11/23/2014    Meloxicam  11/23/2014    Metoclopramide Other (See Comments) 11/07/2015    Sulfa (sulfonamide antibiotics) Itching 11/23/2014       [facility] Assessment Details/Treatment     Patient seen for inpatient wound care consult. Primary RN at bedside and agreeable to assessment at this time. Upon assessment, bilateral lower extremities with closed, scabbed wounds. Patient's daughter states they look better that at home. No open wounds or active drainage noted.     Bilateral buttocks noted to have purple/maroon, slow to benoit discoloration. No open wounds or active drainage noted. Patient's daughter says it looks better than at home.AT home, they were applying neosporin to right buttocks.         Reviewed chart for this encounter.  See flowsheet for findings.      Recommendations: Apply aquaphor to bilateral lower extremities daily and PRN as needed for moisturizing agent.    Apply BPCO BID and PRN to buttocks for capillary stimulation.    No other issues or concerns at this time. Nursing to maintain pressure injury prevention  measures. Skin integrity RONEY consulted. Will follow up 11/20/2024 or sooner if needed.        Discussed POC with patient and primary nurse.  See EMR for orders and patient education.    Bedside nursing to continue care and monitoring.  Bedside to maintain pressure injury prevention interventions.        11/13/24 0845   WOCN Assessment   WOCN Total Time (mins) 30   Visit Date 11/13/24   Visit Time 0845   Consult Type New   WOCN Speciality Wound   Intervention assessed;changed;applied;chart review;coordination of care;orders   Teaching on-going        Wound 11/11/24 0143 Abrasion(s) Left Leg #1   Date First Assessed/Time First Assessed: 11/11/24 0143   Primary Wound Type: Abrasion(s)  Side: Left  Location: Leg  Wound Number: #1  Is this injury device related?: No   Wound Image    Dressing Appearance Open to air;Dry   Drainage Amount None   Drainage Characteristics/Odor No odor   Appearance [place]   Care Cleansed with:;Sterile normal saline;Applied:  (Aquaphor)        Wound 11/11/24 0232 Pressure Injury Right Buttocks #1   Date First Assessed/Time First Assessed: 11/11/24 0232   Present on Original Admission: Yes  Primary Wound Type: Pressure Injury  Side: Right  Location: Buttocks  Wound Number: #1  Is this injury device related?: No   Wound Image    Dressing Appearance Open to air;Dry;Intact   Drainage Amount None   Drainage Characteristics/Odor No odor   Appearance Purple;Maroon   Care Applied:  (BPCO)       Orders placed.   Jack DE JESUSN, RN, [facility]  11/13/2024

## 2024-11-13 NOTE — PROGRESS NOTES
"[facility] - [facility]  Critical Care Medicine  Progress Note    Patient Name: Adriana Strong  MRN: [id number]  Admission Date: 11/10/2024  Hospital Length of Stay: 3 days  Code Status: Full Code  Attending Provider: Amarjit Pierre*  Primary Care Provider: Krzysztof Mcgraw MD   Principal Problem: Acute on chronic diastolic congestive heart failure    Subjective:     HPI:  Adriana Strong is a 79 y.o.F with hx of diastolic HF, Afib on elqiuis, s/p pacemaker, HLD, HTN, non obstructive CAD, s/p TAVR, asthma, COPD, on home oxygen (2L via [place]) who presented to the ED today with complains of worsening SOB. Hx as per  as patient is somnolent and on Bipap at time of interview. Generally she was doing fine in her usual state of health until Friday. She recently had a medication change, she was told to reduce lasix from BID to daily +prn w/ concerns of hyponatremia on OP labs. Denies recent illness. As per chart review: " patient reports compliance with medications, fluid restriction, Na restriction. Also endorsing dysuria for the last couple of days as well as decreased oral intake. Her only complaints today were worsening shortness of breath, weight gain. Denies fever, chills, chest pain, palpitations, abdominal pain, n/v/d, headaches, or any other symptoms at this time. "     In ED: HR 60, Aflutter, AFebrile, /85-->200/75-->150/68, 100% on Bipap. CBC with chronic, stable anemia. Na 128, K 5.6, otherwise CMP unremarkable. Mag 1.5. . Trop negative. TSH 2.559. POC lactate 1.96. UA with 3+ protein, 1+ leukocyte esterase, 17 WBCs, many bacteria, 23 squams. Culture pending. CXR with left pleural effusion and congestive change/edema, developing left lower lung zone consolidation not excluded in this hypoventilatory exam. S/p lasix 80mg inj x2 in ED. VBG PH 7.318 CO2 78.8 O2 94 HCO3 40.4.     Initially admitted to [facility] but stepped up to MICU for rescue bipap in the setting of flash pulmonary edema " #[phone] Chris Hawkins states pt is having sever pains on right side and she is worried as this isn't the first time.   What should she do as she thinks it could be appendicitis and hypercapnic respiratory failure requiring Bipap    Hospital/ICU Course:  Presented for hypoxic respiratory failure and increased work of breathing requiring continued BiPap. Diuresed without improvement in her respiratory status. She underwent thoracentesis on 11/12/24 with 800 cc removed. CT chest with mild edema, multiple small nodules. Will continue to diuresis to euvolemia. Tolerating Bipap qHS. Signifcantly anxious, despite home benzo q6 hours. Will add adjunct therapies as possible.       Interval History/Significant Events: No Acute Overnight Events. Patient is afebrile and hemodynamically stable. Tolerating Bipap well qhs. Will continue to diuresis to euvolemia. Anxious despite home benzo q6hr. Will add adjunct.     CT chest yesterday shows GOO; Tolerated throa well with no acute complications.   DVT study negative.     Review of Systems   Constitutional:  Positive for fatigue. Negative for activity change, chills and fever.   HENT:  Negative for trouble swallowing.    Eyes:  Negative for photophobia and visual disturbance.   Respiratory:  Positive for shortness of breath. Negative for cough and chest tightness.    Cardiovascular:  Positive for leg swelling. Negative for chest pain and palpitations.   Gastrointestinal:  Negative for abdominal pain, constipation, diarrhea, nausea and vomiting.   Genitourinary:  Positive for dysuria and frequency. Negative for hematuria.   Musculoskeletal:  Negative for back pain, gait problem and neck pain.   Skin:  Negative for rash and wound.   Neurological:  Negative for dizziness, syncope, speech difficulty and light-headedness.   Psychiatric/Behavioral:  Negative for agitation and confusion. The patient is not nervous/anxious.      Objective:     Vital Signs (Most Recent):  Temp: 98 °F (36.7 °C) (11/13/24 0701)  Pulse: 60 (11/13/24 0901)  Resp: (!) 30 (11/13/24 0901)  BP: 135/60 (11/13/24 0900)  SpO2: 100 % (11/13/24 0901) Vital Signs (24h Range):  Temp:  [97.5 °F  (36.4 °C)-98.4 °F (36.9 °C)] 98 °F (36.7 °C)  Pulse:  [60] 60  Resp:  [16-32] 30  SpO2:  [95 %-100 %] 100 %  BP: (127-177)/() 135/60   Weight: 93.5 kg (206 lb 2.1 oz)  Body mass index is 35.38 kg/m².      Intake/Output Summary (Last 24 hours) at 11/13/2024 0938  Last data filed at 11/13/2024 0900  Gross per 24 hour   Intake 1186.48 ml   Output 1800 ml   Net -613.52 ml          Physical Exam  HENT:      Head: Normocephalic.      Nose: Nose normal.   Eyes:      Extraocular Movements: Extraocular movements intact.   Cardiovascular:      Rate and Rhythm: Normal rate.      Pulses: Normal pulses.   Pulmonary:      Effort: Pulmonary effort is normal. No respiratory distress.   Abdominal:      General: Abdomen is flat.   Musculoskeletal:      Cervical back: Normal range of motion.      Right lower leg: Edema present.      Left lower leg: Edema present.   Skin:     General: Skin is warm.      Capillary Refill: Capillary refill takes less than 2 seconds.   Neurological:      General: No focal deficit present.      Mental Status: She is alert and oriented to person, place, and time. Mental status is at baseline.   Psychiatric:         Mood and Affect: Mood normal.         Behavior: Behavior normal.         Thought Content: Thought content normal.         Judgment: Judgment normal.            Vents:  Oxygen Concentration (%): 24 (11/12/24 2200)  Lines/Drains/Airways       Drain  Duration                  Urethral Catheter 11/11/24 0127 Non-latex 18 Fr. 2 days              Peripheral Intravenous Line  Duration                  Peripheral IV - Single Lumen 11/10/24 1201 18 G Anterior;Left;Proximal Forearm 2 days         Peripheral IV - Single Lumen 11/11/24 0050 22 G No Anterior;Proximal;Right Upper Arm 2 days                  Significant Labs:    CBC/Anemia Profile:  Recent Labs   Lab 11/11/24  1223 11/12/24  0324 11/13/24  0344   WBC  --  9.15 7.11   HGB  --  9.2* 8.6*   HCT 31* 28.3* 27.4*   PLT  --  299 294   MCV  --   84 86   RDW  --  17.6* 18.1*        Chemistries:  Recent Labs   Lab 11/12/24  0324 11/12/24  0842 11/12/24 2007 11/12/24  2350 11/13/24  0344   *   < > 132* 130* 131*   K 3.9  --  3.9  --  3.8   CL 86*  --  83*  --  85*   CO2 34*  --  36*  --  36*   BUN 18  --  17  --  18   CREATININE 0.8  --  0.8  --  0.8   CALCIUM 8.6*  --  8.5*  --  8.4*   ALBUMIN 2.7*  --   --   --  2.5*   PROT 6.3  --   --   --  5.9*   BILITOT 0.4  --   --   --  0.4   ALKPHOS 41  --   --   --  38*   ALT 16  --   --   --  14   AST 21  --   --   --  17   MG 1.6  --   --   --  1.7   PHOS 2.4*  --   --   --  3.6    < > = values in this interval not displayed.       All pertinent labs within the past 24 hours have been reviewed.    Significant Imaging:  I have reviewed all pertinent imaging results/findings within the past 24 hours.    ABG  Recent Labs   Lab 11/11/24  1223   PH 7.388   PO2 91   PCO2 68.0*   HCO3 41.0*   BE 16*     Assessment/Plan:     Pulmonary  Pleural effusion  R>L. S/p thoracentesis. Fluid studies with transudate process. No culture results yet     - CT chest w/ GOO, improved effusion.     Acute on chronic respiratory failure with hypoxia  Patient with Hypercapnic and Hypoxic Respiratory failure which is Acute on chronic.  she is on home oxygen at 2 LPM. Supplemental oxygen was provided and noted- Oxygen Concentration (%):  [24-26] 24    Signs/symptoms of respiratory failure include- increased work of breathing. Contributing diagnoses includes - CHF Labs and images were reviewed. Patient Has recent ABG, which has been reviewed. Will treat underlying causes and adjust management of respiratory failure as follows:    Suspect worsening respiratory 2/2 flash pulmonary edema in the setting of HTN and CHF exacerbation.      --Continue 80 mg lasix BID  --Continuous BiPAP as needed   --SpO2 goal 88-92% with hx COPD  --Continue supportive anxyolytic    COPD (chronic obstructive pulmonary disease)  --PRN  "duonebs    Cardiac/Vascular  * Acute on chronic diastolic congestive heart failure    No results for input(s): "BNP" in the last 72 hours.    Latest ECHO  Results for orders placed during the hospital encounter of 09/24/24    Echo      Left Ventricle: The left ventricle is normal in size. Normal wall thickness. There is normal systolic function. Ejection fraction is approximately 65%. There is normal diastolic function.    Right Ventricle: Normal right ventricular cavity size. Wall thickness is normal. Systolic function is normal. Pacemaker lead present in the ventricle.    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 It is reported to be a 26 mm Phan valve. The leaflets appear mildly thickened. Aortic valve area by VTI is 0.7 cm2. LVOT diameter is 1.9 cm. Aortic valve peak velocity is 3.3 m/s. Mean gradient is 33.3 mmHg. The dimensionless index is 0.26. Mild paravalvular regurgitation.    Mitral Valve: There is moderate mitral annular calcification present.    Tricuspid Valve: There is moderate regurgitation.    Pulmonary Artery: There is pulmonary hypertension. The estimated pulmonary artery systolic pressure is 62 mmHg.    IVC/SVC: Intermediate venous pressure at 8 mmHg.    Current Heart Failure Medications  carvediloL tablet 6.25 mg, 2 times daily with meals, Oral  spironolactone tablet 25 mg, Daily, Oral  furosemide injection 80 mg, 2 times daily, Intravenous  hydrALAZINE injection 10 mg, Every 6 hours PRN, Intravenous    --Monitor strict I&Os and daily weights.    --Place on telemetry  --Low sodium diet  -- Diuresis to Euvolemia        CAD (coronary artery disease)  --Continue home statin, plavix    Aortic stenosis  S/p TAVR    Permanent atrial fibrillation  --Continue home eliquis  --Continuous cardiac monitoring  --EKG PRN  --Replete lytes goal K>4, Mag>2    Essential hypertension  --Continue home oral BP meds  --Volume removal with IV lasix    HLD " (hyperlipidemia)  --Continue home statin    Renal/  Acute cystitis without hematuria  Reported dysuria with dark urine.  UA concerning for UTI.      --Enterococcus growing on cultures  --on zosyn; Continue Abx for 5-7 days    Oncology  Anemia  --Trend CBC daily  --Transfuse for Hgb <7 or active bleeding  --Type and screen    Endocrine  Hyponatremia  Likely 2/2 heart failure.  Stable.     --Trend BMP daily  --Lasix for volume removal   --Goal Na correction 4-6mEq in 24 hrs  --Follow up urine studies    Type 2 diabetes mellitus  --Accuchecks ACHS  --SSI, hypoglycemia protocol  --Inpatient BG goal 140-180    GI  Gastroesophageal reflux disease  --Continue PPI       Critical Care Daily Checklist:    A: Awake: RASS Goal/Actual Goal: RASS Goal: 0-->alert and calm  Actual:     B: Spontaneous Breathing Trial Performed?     C: SAT & SBT Coordinated?  N/a                      D: Delirium: CAM-ICU Overall CAM-ICU: Negative   E: Early Mobility Performed? Yes; PT OT consulted   F: Feeding Goal:    Status:     Current Diet Order   Procedures    Diet diabetic Low Chol/Sat Fat; 2000 Calories (up to 75 gm per meal); Fluid - 1500mL     Order Specific Question:   Additional Diet Options:     Answer:   Low Chol/Sat Fat     Order Specific Question:   Total calories / carbs:     Answer:   2000 Calories (up to 75 gm per meal)     Order Specific Question:   Fluid restriction:     Answer:   Fluid - 1500mL      AS: Analgesia/Sedation PRN   T: Thromboembolic Prophylaxis Eliquis   H: HOB > 300 Yes   U: Stress Ulcer Prophylaxis (if needed) PPI   G: Glucose Control PRN   B: Bowel Function     I: Indwelling Catheter (Lines & Sims) Necessity PIV x2; Sims   D: De-escalation of Antimicrobials/Pharmacotherapies Not yet    Plan for the day/ETD Optimize    Code Status:  Family/Goals of Care: Full Code         Critical secondary to Patient has a condition that poses threat to life and bodily function: Severe Respiratory Distress      Critical care  was time spent personally by me on the following activities: development of treatment plan with patient or surrogate and bedside caregivers, discussions with consultants, evaluation of patient's response to treatment, examination of patient, ordering and performing treatments and interventions, ordering and review of laboratory studies, ordering and review of radiographic studies, pulse oximetry, re-evaluation of patient's condition. This critical care time did not overlap with that of any other provider or involve time for any procedures.     Jesus Farris,   Critical Care Medicine  [facility]

## 2024-11-14 ENCOUNTER — TELEPHONE (OUTPATIENT)
Dept: CARDIOLOGY | Facility: CLINIC | Age: 79
End: 2024-11-14
Payer: MEDICARE

## 2024-11-14 PROBLEM — F41.9 ANXIETY: Status: ACTIVE | Noted: 2024-11-14

## 2024-11-14 LAB
ALBUMIN SERPL BCP-MCNC: 2.5 G/DL (ref 3.5–5.2)
ALP SERPL-CCNC: 39 U/L (ref 40–150)
ALT SERPL W/O P-5'-P-CCNC: 12 U/L (ref 10–44)
ANION GAP SERPL CALC-SCNC: 10 MMOL/L (ref 8–16)
ANION GAP SERPL CALC-SCNC: 9 MMOL/L (ref 8–16)
AST SERPL-CCNC: 19 U/L (ref 10–40)
AV AREA BY CONTINUOUS VTI: 0.8 CM2
AV INDEX (PROSTH): 0.25
AV LVOT MEAN GRADIENT: 1 MMHG
AV LVOT PEAK GRADIENT: 2 MMHG
AV MEAN GRADIENT: 21.8 MMHG
AV PEAK GRADIENT: 33.6 MMHG
AV VALVE AREA BY VELOCITY RATIO: 0.8 CM²
AV VALVE AREA: 0.8 CM2
AV VELOCITY RATIO: 0.24
BACTERIA UR CULT: ABNORMAL
BASOPHILS # BLD AUTO: 0.03 K/UL (ref 0–0.2)
BASOPHILS NFR BLD: 0.4 % (ref 0–1.9)
BILIRUB SERPL-MCNC: 0.4 MG/DL (ref 0.1–1)
BSA FOR ECHO PROCEDURE: 2.06 M2
BUN SERPL-MCNC: 18 MG/DL (ref 8–23)
BUN SERPL-MCNC: 18 MG/DL (ref 8–23)
CALCIUM SERPL-MCNC: 8.3 MG/DL (ref 8.7–10.5)
CALCIUM SERPL-MCNC: 8.4 MG/DL (ref 8.7–10.5)
CHLORIDE SERPL-SCNC: 85 MMOL/L (ref 95–110)
CHLORIDE SERPL-SCNC: 85 MMOL/L (ref 95–110)
CO2 SERPL-SCNC: 37 MMOL/L (ref 23–29)
CO2 SERPL-SCNC: 37 MMOL/L (ref 23–29)
CREAT SERPL-MCNC: 0.8 MG/DL (ref 0.5–1.4)
CREAT SERPL-MCNC: 0.9 MG/DL (ref 0.5–1.4)
CV ECHO LV RWT: 0.5 CM
DIFFERENTIAL METHOD BLD: ABNORMAL
DOP CALC AO PEAK VEL: 2.9 M/S
DOP CALC AO VTI: 70.5 CM
DOP CALC LVOT AREA: 3.1 CM2
DOP CALC LVOT DIAMETER: 2 CM
DOP CALC LVOT PEAK VEL: 0.7 M/S
DOP CALC LVOT STROKE VOLUME: 54.3 CM3
DOP CALCLVOT PEAK VEL VTI: 17.3 CM
E WAVE DECELERATION TIME: 309.81 MS
E/A RATIO: 4.47
E/E' RATIO: 26.8 M/S
ECHO EF ESTIMATED: 76 %
ECHO LV POSTERIOR WALL: 1.1 CM (ref 0.6–1.1)
EJECTION FRACTION: 65 %
EOSINOPHIL # BLD AUTO: 0 K/UL (ref 0–0.5)
EOSINOPHIL NFR BLD: 0.5 % (ref 0–8)
ERYTHROCYTE [DISTWIDTH] IN BLOOD BY AUTOMATED COUNT: 17.9 % (ref 11.5–14.5)
ERYTHROCYTE [SEDIMENTATION RATE] IN BLOOD BY PHOTOMETRIC METHOD: 74 MM/HR (ref 0–36)
EST. GFR  (NO RACE VARIABLE): >60 ML/MIN/1.73 M^2
EST. GFR  (NO RACE VARIABLE): >60 ML/MIN/1.73 M^2
FINAL PATHOLOGIC DIAGNOSIS: NORMAL
FRACTIONAL SHORTENING: 45.5 % (ref 28–44)
GLUCOSE SERPL-MCNC: 114 MG/DL (ref 70–110)
GLUCOSE SERPL-MCNC: 131 MG/DL (ref 70–110)
HCT VFR BLD AUTO: 27.6 % (ref 37–48.5)
HGB BLD-MCNC: 8.7 G/DL (ref 12–16)
IMM GRANULOCYTES # BLD AUTO: 0.04 K/UL (ref 0–0.04)
IMM GRANULOCYTES NFR BLD AUTO: 0.5 % (ref 0–0.5)
INTERVENTRICULAR SEPTUM: 0.9 CM (ref 0.6–1.1)
IVC DIAMETER: 2.3 CM
LA MAJOR: 6.72 CM
LA MINOR: 5.47 CM
LA WIDTH: 4.07 CM
LEFT ATRIUM SIZE: 3.54 CM
LEFT ATRIUM VOLUME INDEX MOD: 42.9 ML/M2
LEFT ATRIUM VOLUME INDEX: 37.3 ML/M2
LEFT ATRIUM VOLUME MOD: 85 ML
LEFT ATRIUM VOLUME: 73.86 CM3
LEFT INTERNAL DIMENSION IN SYSTOLE: 2.4 CM (ref 2.1–4)
LEFT VENTRICLE DIASTOLIC VOLUME INDEX: 44.3 ML/M2
LEFT VENTRICLE DIASTOLIC VOLUME: 87.71 ML
LEFT VENTRICLE MASS INDEX: 74.7 G/M2
LEFT VENTRICLE SYSTOLIC VOLUME INDEX: 10.5 ML/M2
LEFT VENTRICLE SYSTOLIC VOLUME: 20.73 ML
LEFT VENTRICULAR INTERNAL DIMENSION IN DIASTOLE: 4.4 CM (ref 3.5–6)
LEFT VENTRICULAR MASS: 147.8 G
LV LATERAL E/E' RATIO: 33.5 M/S
LV SEPTAL E/E' RATIO: 22.33 M/S
LYMPHOCYTES # BLD AUTO: 0.8 K/UL (ref 1–4.8)
LYMPHOCYTES NFR BLD: 9.4 % (ref 18–48)
Lab: 0.3 M/S
Lab: NORMAL
MAGNESIUM SERPL-MCNC: 1.5 MG/DL (ref 1.6–2.6)
MCH RBC QN AUTO: 26.9 PG (ref 27–31)
MCHC RBC AUTO-ENTMCNC: 31.5 G/DL (ref 32–36)
MCV RBC AUTO: 85 FL (ref 82–98)
MONOCYTES # BLD AUTO: 1 K/UL (ref 0.3–1)
MONOCYTES NFR BLD: 12.1 % (ref 4–15)
MV PEAK A VEL: 0.3 M/S
MV PEAK E VEL: 1.34 M/S
NEUTROPHILS # BLD AUTO: 6.3 K/UL (ref 1.8–7.7)
NEUTROPHILS NFR BLD: 77.1 % (ref 38–73)
NRBC BLD-RTO: 0 /100 WBC
OHS CV RV/LV RATIO: 0.68 CM
PHOSPHATE SERPL-MCNC: 3.3 MG/DL (ref 2.7–4.5)
PISA TR MAX VEL: 3.44 M/S
PISA TR RADIUS: 0.6 CM
PLATELET # BLD AUTO: 320 K/UL (ref 150–450)
PMV BLD AUTO: 9.7 FL (ref 9.2–12.9)
POCT GLUCOSE: 146 MG/DL (ref 70–110)
POCT GLUCOSE: 154 MG/DL (ref 70–110)
POCT GLUCOSE: 161 MG/DL (ref 70–110)
POTASSIUM SERPL-SCNC: 3.6 MMOL/L (ref 3.5–5.1)
POTASSIUM SERPL-SCNC: 4.8 MMOL/L (ref 3.5–5.1)
PROT SERPL-MCNC: 5.9 G/DL (ref 6–8.4)
RA MAJOR: 5.6 CM
RA PRESSURE ESTIMATED: 15 MMHG
RA WIDTH: 4.29 CM
RBC # BLD AUTO: 3.23 M/UL (ref 4–5.4)
RIGHT VENTRICLE DIASTOLIC BASEL DIMENSION: 3 CM
RV TB RVSP: 18 MMHG
SINUS: 2.18 CM
SODIUM SERPL-SCNC: 131 MMOL/L (ref 136–145)
SODIUM SERPL-SCNC: 132 MMOL/L (ref 136–145)
SODIUM SERPL-SCNC: 133 MMOL/L (ref 136–145)
SODIUM SERPL-SCNC: 135 MMOL/L (ref 136–145)
STJ: 2.26 CM
TDI LATERAL: 0.04 M/S
TDI SEPTAL: 0.06 M/S
TDI: 0.05 M/S
TR MAX PG: 47 MMHG
TV PEAK GRADIENT: 47 MMHG
TV REST PULMONARY ARTERY PRESSURE: 62 MMHG
TV VENA CONTRACTA: 0.7 CM
WBC # BLD AUTO: 8.17 K/UL (ref 3.9–12.7)
Z-SCORE OF LEFT VENTRICULAR DIMENSION IN END DIASTOLE: -2.62
Z-SCORE OF LEFT VENTRICULAR DIMENSION IN END SYSTOLE: -3.01

## 2024-11-14 PROCEDURE — 99222 1ST HOSP IP/OBS MODERATE 55: CPT | Mod: ,,, | Performed by: NURSE PRACTITIONER

## 2024-11-14 PROCEDURE — 85025 COMPLETE CBC W/AUTO DIFF WBC: CPT

## 2024-11-14 PROCEDURE — 84295 ASSAY OF SERUM SODIUM: CPT | Mod: 91

## 2024-11-14 PROCEDURE — 84100 ASSAY OF PHOSPHORUS: CPT

## 2024-11-14 PROCEDURE — 63600175 PHARM REV CODE 636 W HCPCS: Performed by: INTERNAL MEDICINE

## 2024-11-14 PROCEDURE — 99233 SBSQ HOSP IP/OBS HIGH 50: CPT | Mod: GC,,, | Performed by: INTERNAL MEDICINE

## 2024-11-14 PROCEDURE — 63600175 PHARM REV CODE 636 W HCPCS

## 2024-11-14 PROCEDURE — 25000003 PHARM REV CODE 250

## 2024-11-14 PROCEDURE — 83735 ASSAY OF MAGNESIUM: CPT

## 2024-11-14 PROCEDURE — 97535 SELF CARE MNGMENT TRAINING: CPT

## 2024-11-14 PROCEDURE — 27100171 HC OXYGEN HIGH FLOW UP TO 24 HOURS

## 2024-11-14 PROCEDURE — 20600001 HC STEP DOWN PRIVATE ROOM

## 2024-11-14 PROCEDURE — 63600175 PHARM REV CODE 636 W HCPCS: Performed by: STUDENT IN AN ORGANIZED HEALTH CARE EDUCATION/TRAINING PROGRAM

## 2024-11-14 PROCEDURE — 97530 THERAPEUTIC ACTIVITIES: CPT

## 2024-11-14 PROCEDURE — 80053 COMPREHEN METABOLIC PANEL: CPT

## 2024-11-14 PROCEDURE — 94799 UNLISTED PULMONARY SVC/PX: CPT

## 2024-11-14 PROCEDURE — 97165 OT EVAL LOW COMPLEX 30 MIN: CPT

## 2024-11-14 PROCEDURE — 25000003 PHARM REV CODE 250: Performed by: STUDENT IN AN ORGANIZED HEALTH CARE EDUCATION/TRAINING PROGRAM

## 2024-11-14 PROCEDURE — 94660 CPAP INITIATION&MGMT: CPT

## 2024-11-14 PROCEDURE — 80069 RENAL FUNCTION PANEL: CPT | Performed by: INTERNAL MEDICINE

## 2024-11-14 PROCEDURE — 97162 PT EVAL MOD COMPLEX 30 MIN: CPT

## 2024-11-14 PROCEDURE — 94761 N-INVAS EAR/PLS OXIMETRY MLT: CPT

## 2024-11-14 PROCEDURE — 80048 BASIC METABOLIC PNL TOTAL CA: CPT | Mod: XB | Performed by: STUDENT IN AN ORGANIZED HEALTH CARE EDUCATION/TRAINING PROGRAM

## 2024-11-14 PROCEDURE — 84295 ASSAY OF SERUM SODIUM: CPT

## 2024-11-14 PROCEDURE — 25000003 PHARM REV CODE 250: Performed by: INTERNAL MEDICINE

## 2024-11-14 PROCEDURE — 99900035 HC TECH TIME PER 15 MIN (STAT)

## 2024-11-14 RX ORDER — SENNOSIDES 8.6 MG/1
8.6 TABLET ORAL 2 TIMES DAILY
Status: DISCONTINUED | OUTPATIENT
Start: 2024-11-14 | End: 2024-11-23 | Stop reason: HOSPADM

## 2024-11-14 RX ORDER — MAGNESIUM SULFATE HEPTAHYDRATE 40 MG/ML
2 INJECTION, SOLUTION INTRAVENOUS ONCE
Status: COMPLETED | OUTPATIENT
Start: 2024-11-14 | End: 2024-11-14

## 2024-11-14 RX ORDER — POLYETHYLENE GLYCOL 3350 17 G/17G
17 POWDER, FOR SOLUTION ORAL 2 TIMES DAILY
Status: DISCONTINUED | OUTPATIENT
Start: 2024-11-14 | End: 2024-11-23 | Stop reason: HOSPADM

## 2024-11-14 RX ORDER — DOXYCYCLINE HYCLATE 100 MG
100 TABLET ORAL EVERY 12 HOURS
Status: DISCONTINUED | OUTPATIENT
Start: 2024-11-14 | End: 2024-11-16

## 2024-11-14 RX ADMIN — DULOXETINE HYDROCHLORIDE 20 MG: 20 CAPSULE, DELAYED RELEASE ORAL at 09:11

## 2024-11-14 RX ADMIN — OXYCODONE HYDROCHLORIDE AND ACETAMINOPHEN 1000 MG: 500 TABLET ORAL at 09:11

## 2024-11-14 RX ADMIN — GABAPENTIN 300 MG: 300 CAPSULE ORAL at 09:11

## 2024-11-14 RX ADMIN — ATORVASTATIN CALCIUM 10 MG: 10 TABLET, FILM COATED ORAL at 09:11

## 2024-11-14 RX ADMIN — FUROSEMIDE 80 MG: 10 INJECTION, SOLUTION INTRAVENOUS at 05:11

## 2024-11-14 RX ADMIN — MONTELUKAST 10 MG: 10 TABLET, FILM COATED ORAL at 09:11

## 2024-11-14 RX ADMIN — MAGNESIUM SULFATE IN WATER 2 G: 40 INJECTION, SOLUTION INTRAVENOUS at 06:11

## 2024-11-14 RX ADMIN — Medication: at 09:11

## 2024-11-14 RX ADMIN — SENNOSIDES 8.6 MG: 8.6 TABLET, FILM COATED ORAL at 09:11

## 2024-11-14 RX ADMIN — CARVEDILOL 12.5 MG: 12.5 TABLET, FILM COATED ORAL at 05:11

## 2024-11-14 RX ADMIN — LOSARTAN POTASSIUM 50 MG: 50 TABLET, FILM COATED ORAL at 09:11

## 2024-11-14 RX ADMIN — POLYETHYLENE GLYCOL 3350 17 G: 17 POWDER, FOR SOLUTION ORAL at 09:11

## 2024-11-14 RX ADMIN — RANOLAZINE 1000 MG: 500 TABLET, EXTENDED RELEASE ORAL at 09:11

## 2024-11-14 RX ADMIN — PIPERACILLIN SODIUM AND TAZOBACTAM SODIUM 4.5 G: 4; .5 INJECTION, POWDER, FOR SOLUTION INTRAVENOUS at 02:11

## 2024-11-14 RX ADMIN — PANTOPRAZOLE SODIUM 40 MG: 40 TABLET, DELAYED RELEASE ORAL at 09:11

## 2024-11-14 RX ADMIN — SPIRONOLACTONE 25 MG: 25 TABLET ORAL at 09:11

## 2024-11-14 RX ADMIN — APIXABAN 5 MG: 5 TABLET, FILM COATED ORAL at 09:11

## 2024-11-14 RX ADMIN — POTASSIUM BICARBONATE 40 MEQ: 391 TABLET, EFFERVESCENT ORAL at 06:11

## 2024-11-14 RX ADMIN — DOXYCYCLINE HYCLATE 100 MG: 100 TABLET, COATED ORAL at 09:11

## 2024-11-14 RX ADMIN — DOXYCYCLINE 100 MG: 100 INJECTION, POWDER, LYOPHILIZED, FOR SOLUTION INTRAVENOUS at 02:11

## 2024-11-14 RX ADMIN — PIPERACILLIN SODIUM AND TAZOBACTAM SODIUM 4.5 G: 4; .5 INJECTION, POWDER, FOR SOLUTION INTRAVENOUS at 09:11

## 2024-11-14 RX ADMIN — ISOSORBIDE MONONITRATE 60 MG: 60 TABLET, EXTENDED RELEASE ORAL at 09:11

## 2024-11-14 RX ADMIN — CARVEDILOL 12.5 MG: 12.5 TABLET, FILM COATED ORAL at 09:11

## 2024-11-14 RX ADMIN — CLOPIDOGREL BISULFATE 75 MG: 75 TABLET ORAL at 09:11

## 2024-11-14 RX ADMIN — FUROSEMIDE 80 MG: 10 INJECTION, SOLUTION INTRAVENOUS at 09:11

## 2024-11-14 RX ADMIN — PIPERACILLIN SODIUM AND TAZOBACTAM SODIUM 4.5 G: 4; .5 INJECTION, POWDER, FOR SOLUTION INTRAVENOUS at 05:11

## 2024-11-14 NOTE — SUBJECTIVE & OBJECTIVE
Interval History/Significant Events:   Htnsive over night, restarted on home losartan.     Patient is afebrile and hemodynamically stable. Tolerating Bipap well qhs. Will continue to diuresis to euvolemia. Reports improved anxiety w/ home benzo q6hr, adjunct and home SNRI and continual diuresis.     Likely SD to        Review of Systems   Constitutional:  Positive for fatigue. Negative for activity change, chills and fever.   HENT:  Negative for trouble swallowing.    Eyes:  Negative for photophobia and visual disturbance.   Respiratory:  Positive for shortness of breath. Negative for cough and chest tightness.    Cardiovascular:  Positive for leg swelling. Negative for chest pain and palpitations.   Gastrointestinal:  Negative for abdominal pain, constipation, diarrhea, nausea and vomiting.   Genitourinary:  Positive for dysuria and frequency. Negative for hematuria.   Musculoskeletal:  Negative for back pain, gait problem and neck pain.   Skin:  Negative for rash and wound.   Neurological:  Negative for dizziness, syncope, speech difficulty and light-headedness.   Psychiatric/Behavioral:  Negative for agitation and confusion. The patient is not nervous/anxious.      Objective:     Vital Signs (Most Recent):  Temp: 97.8 °F (36.6 °C) (11/14/24 0800)  Pulse: 60 (11/14/24 0800)  Resp: (!) 24 (11/14/24 0800)  BP: (!) 110/51 (11/14/24 0800)  SpO2: 100 % (11/14/24 0800) Vital Signs (24h Range):  Temp:  [97.6 °F (36.4 °C)-98.1 °F (36.7 °C)] 97.8 °F (36.6 °C)  Pulse:  [60] 60  Resp:  [17-34] 24  SpO2:  [95 %-100 %] 100 %  BP: (105-197)/(51-88) 110/51   Weight: 93.9 kg (207 lb)  Body mass index is 35.53 kg/m².      Intake/Output Summary (Last 24 hours) at 11/14/2024 0913  Last data filed at 11/14/2024 0600  Gross per 24 hour   Intake 1294.5 ml   Output 1824 ml   Net -529.5 ml          Physical Exam  HENT:      Head: Normocephalic.      Nose: Nose normal.   Eyes:      Extraocular Movements: Extraocular movements intact.    Cardiovascular:      Rate and Rhythm: Normal rate.      Pulses: Normal pulses.   Pulmonary:      Effort: Pulmonary effort is normal. No respiratory distress.   Abdominal:      General: Abdomen is flat.   Musculoskeletal:      Cervical back: Normal range of motion.      Right lower leg: Edema present.      Left lower leg: Edema present.   Skin:     General: Skin is warm.      Capillary Refill: Capillary refill takes less than 2 seconds.   Neurological:      General: No focal deficit present.      Mental Status: She is alert and oriented to person, place, and time. Mental status is at baseline.   Psychiatric:         Mood and Affect: Mood normal.         Behavior: Behavior normal.         Thought Content: Thought content normal.         Judgment: Judgment normal.            Vents:  Oxygen Concentration (%): 24 (11/14/24 0008)  Lines/Drains/Airways       Drain  Duration                  Urethral Catheter 11/11/24 0127 Non-latex 18 Fr. 3 days              Peripheral Intravenous Line  Duration                  Peripheral IV - Single Lumen 11/10/24 1201 18 G Anterior;Left;Proximal Forearm 3 days         Peripheral IV - Single Lumen 11/14/24 0730 22 G Left;Posterior Forearm <1 day                  Significant Labs:    CBC/Anemia Profile:  Recent Labs   Lab 11/13/24  0344 11/14/24  0341   WBC 7.11 8.17   HGB 8.6* 8.7*   HCT 27.4* 27.6*    320   MCV 86 85   RDW 18.1* 17.9*        Chemistries:  Recent Labs   Lab 11/13/24  0344 11/13/24  0902 11/13/24 2026 11/14/24  0040 11/14/24  0341 11/14/24  0744   *   < > 132* 133* 131* 132*   K 3.8   < > 3.7  --  3.6 4.8   CL 85*   < > 85*  --  85* 85*   CO2 36*   < > 37*  --  37* 37*   BUN 18   < > 16  --  18 18   CREATININE 0.8   < > 0.8  --  0.8 0.9   CALCIUM 8.4*   < > 8.3*  --  8.3* 8.4*   ALBUMIN 2.5*  --   --   --  2.5*  --    PROT 5.9*  --   --   --  5.9*  --    BILITOT 0.4  --   --   --  0.4  --    ALKPHOS 38*  --   --   --  39*  --    ALT 14  --   --   --  12   --    AST 17  --   --   --  19  --    MG 1.7  --   --   --  1.5*  --    PHOS 3.6  --   --   --  3.3  --     < > = values in this interval not displayed.       All pertinent labs within the past 24 hours have been reviewed.    Significant Imaging:  I have reviewed all pertinent imaging results/findings within the past 24 hours.

## 2024-11-14 NOTE — SUBJECTIVE & OBJECTIVE
Scheduled Meds:   apixaban  5 mg Oral BID    ascorbic acid (vitamin C)  1,000 mg Oral Daily    atorvastatin  10 mg Oral Daily    balsam peru-castor oiL   Topical (Top) BID    carvediloL  12.5 mg Oral BID WM    clopidogreL  75 mg Oral Daily    doxycycline IV (PEDS and ADULTS)  100 mg Intravenous Q12H    DULoxetine  20 mg Oral Daily    furosemide (LASIX) injection  80 mg Intravenous BID    gabapentin  300 mg Oral QHS    isosorbide mononitrate  60 mg Oral Daily    losartan  50 mg Oral Daily    montelukast  10 mg Oral Daily    pantoprazole  40 mg Oral Daily    piperacillin-tazobactam (Zosyn) IV (PEDS and ADULTS) (extended infusion is not appropriate)  4.5 g Intravenous Q8H    polyethylene glycol  17 g Oral BID    ranolazine  1,000 mg Oral BID    senna  8.6 mg Oral BID    spironolactone  25 mg Oral Daily     Continuous Infusions:  PRN Meds:  Current Facility-Administered Medications:     acetaminophen, 650 mg, Oral, Q4H PRN    albuterol-ipratropium, 3 mL, Nebulization, Q4H PRN    dextrose 10%, 12.5 g, Intravenous, PRN    dextrose 10%, 25 g, Intravenous, PRN    glucagon (human recombinant), 1 mg, Intramuscular, PRN    glucose, 16 g, Oral, PRN    glucose, 24 g, Oral, PRN    hydrALAZINE, 10 mg, Intravenous, Q6H PRN    hydrOXYzine HCL, 25 mg, Oral, TID PRN    insulin aspart U-100, 0-5 Units, Subcutaneous, QID (AC + HS) PRN    LORazepam, 0.5 mg, Oral, Q6H PRN    melatonin, 6 mg, Oral, Nightly PRN    naloxone, 0.02 mg, Intravenous, PRN    ondansetron, 8 mg, Oral, Q8H PRN    polyethylene glycol, 17 g, Oral, Daily PRN    prochlorperazine, 5 mg, Intravenous, Q4H PRN    sodium chloride 0.9%, 10 mL, Intravenous, PRN    sodium chloride 0.9%, 10 mL, Intravenous, Q12H PRN    sodium chloride 0.9%, 10 mL, Intravenous, PRN    white petrolatum, , Topical (Top), PRN    Review of patient's allergies indicates:   Allergen Reactions    Celebrex [celecoxib] Shortness Of Breath    Ciprofloxacin Swelling     lip    Dicyclomine Hives    Adhesive  Dermatitis    Avelox [moxifloxacin] Swelling    Cilostazol Other (See Comments)     Elevates blood pressure    Eryc [erythromycin] Other (See Comments)     unknown    Iodine and iodide containing products Hives    Keflex [cephalexin] Hives     Tolerated ceftriaxone (11/11/2024)    Meclomen      rashes    Meloxicam      Ear ringing    Metoclopramide Other (See Comments)     High blood pressure    Sulfa (sulfonamide antibiotics) Itching        Past Medical History:   Diagnosis Date    Acute on chronic diastolic (congestive) heart failure 11/20/2019    Anticoagulant long-term use     on Plavix since May 2015    Anxiety     Arthritis     Asthma     Atrial fibrillation     Atrial fibrillation with RVR 10/19/2020    Cataracts, bilateral     Chest pain, atypical     Chronic atrial fibrillation with rapid ventricular response 06/23/2017    Colon polyp     Coronary artery disease     Diabetes mellitus     Dry eyes     Esophageal erosions     General anesthetics causing adverse effect in therapeutic use     GERD (gastroesophageal reflux disease)     Heart murmur     Hemorrhoid     High cholesterol     Hypertension     Irritable bowel syndrome     Paroxysmal atrial fibrillation 10/30/2020    Persistent atrial fibrillation 02/10/2020    Shingles 2015    Stenosis of aortic and mitral valves     Stroke     TIA (transient ischemic attack)     Use of cane as ambulatory aid      Past Surgical History:   Procedure Laterality Date    ABDOMINAL SURGERY      stents to SMA    angiocele      2007, 2014    AORTIC VALVE REPLACEMENT N/A 11/19/2019    Procedure: Replacement-valve-aortic;  Surgeon: Jack Capone MD;  Location: [facility] CATH LAB;  Service: Cardiology;  Laterality: N/A;    BREAST SURGERY      left--- a lump--- no cancer    CARDIAC VALVE SURGERY      COLONOSCOPY  2014    COLONOSCOPY N/A 3/14/2018    Procedure: COLONOSCOPY;  Surgeon: Efren Kemp MD;  Location: [facility] ENDO ([facility]);  Service: Endoscopy;  Laterality: N/A;  ok to hold  Plavix 5 days prior to procedure per Dr RESHMA Hernandez     ok per Dr Kemp to hold Eliquis 2 days prior to procedure (see telephone encounter dated-2/7/18)    CORONARY ANGIOGRAPHY N/A 2/15/2024    Procedure: ANGIOGRAM, CORONARY ARTERY;  Surgeon: Jos Baptiste MD;  Location: [facility] CATH LAB;  Service: Cardiology;  Laterality: N/A;    HERNIA REPAIR      HYSTERECTOMY  partial    1982 partial hysterectomy    IMPLANTATION OF BIVENTRICULAR PERMANENT PACEMAKER AS UPGRADE TO EXISTING PACEMAKER Left 5/29/2024    Procedure: Biventricular pacemaker upgrade;  Surgeon: Chencho Moeller MD;  Location: [facility] EP LAB;  Service: Cardiology;  Laterality: Left;  CHF, AF,  CRTP ugrd, BIO, MAC, CO, 3prep ** BIO dcPPM in situ/ Contrast Prep**    LEFT HEART CATHETERIZATION Right 11/5/2019    Procedure: Left heart cath;  Surgeon: Jack Capone MD;  Location: [facility] CATH LAB;  Service: Cardiology;  Laterality: Right;    left nasal polyp      left neck lymph node      nose polyp      PHLEBOGRAPHY Left 5/1/2024    Procedure: Venogram;  Surgeon: Chencho Moeller MD;  Location: [facility] EP LAB;  Service: Cardiology;  Laterality: Left;  HF, Venogram ( Lt arm) , CO, 3 prep ** BIO dcPPM in situ/ Contrast Prep**    right hip fatty mass tissue      stent to small intestine      SUPERIOR VENA CAVA ANGIOPLASTY / STENTING      TONSILLECTOMY      TOTAL ABDOMINAL HYSTERECTOMY      2014    TOTAL KNEE ARTHROPLASTY Bilateral     TREATMENT OF CARDIAC ARRHYTHMIA N/A 2/10/2020    Procedure: CARDIOVERSION;  Surgeon: Jayy Parrish MD;  Location: [facility] EP LAB;  Service: Cardiology;  Laterality: N/A;  AF, PEDRO, DCCV, MAC, DM, 3 Prep    TREATMENT OF CARDIAC ARRHYTHMIA N/A 5/15/2020    Procedure: CARDIOVERSION;  Surgeon: Hany Bee MD;  Location: [facility] EP LAB;  Service: Cardiology;  Laterality: N/A;  AF, PEDRO, DCCV, MAC, DM, 3 Prep    UPPER GASTROINTESTINAL ENDOSCOPY  2014       Family History       Problem Relation (Age of Onset)    Heart attack Mother    Heart failure  Brother    Stroke Father          Tobacco Use    Smoking status: Never    Smokeless tobacco: Never   Substance and Sexual Activity    Alcohol use: No    Drug use: Never    Sexual activity: Not Currently     Partners: Male     Review of Systems   Skin:  Positive for wound.       Objective:     Vital Signs (Most Recent):  Temp: 97.8 °F (36.6 °C) (11/14/24 0800)  Pulse: 60 (11/14/24 1100)  Resp: (!) 22 (11/14/24 1100)  BP: (!) 97/46 (11/14/24 1100)  SpO2: 100 % (11/14/24 1100) Vital Signs (24h Range):  Temp:  [97.6 °F (36.4 °C)-98 °F (36.7 °C)] 97.8 °F (36.6 °C)  Pulse:  [60] 60  Resp:  [17-34] 22  SpO2:  [95 %-100 %] 100 %  BP: ()/(46-88) 97/46     Weight: 93.9 kg (207 lb)  Body mass index is 35.53 kg/m².     Physical Exam  Constitutional:       Appearance: Normal appearance.   Skin:     General: Skin is warm and dry.      Findings: Lesion present.   Neurological:      Mental Status: She is alert.          Laboratory:  All pertinent labs reviewed within the last 24 hours.    Diagnostic Results:  None

## 2024-11-14 NOTE — ASSESSMENT & PLAN NOTE
Patient with Hypercapnic and Hypoxic Respiratory failure which is Acute on chronic.  she is on home oxygen at 2 LPM. Supplemental oxygen was provided and noted- Oxygen Concentration (%):  [24] 24    Signs/symptoms of respiratory failure include- increased work of breathing. Contributing diagnoses includes - CHF Labs and images were reviewed. Patient Has recent ABG, which has been reviewed. Will treat underlying causes and adjust management of respiratory failure as follows:    Suspect worsening respiratory 2/2 flash pulmonary edema in the setting of HTN and CHF exacerbation.      --Continue 80 mg lasix BID  --Continuous BiPAP as needed   --SpO2 goal 88-92% with hx COPD  --Continue supportive anxyolytic

## 2024-11-14 NOTE — PT/OT/SLP EVAL
Physical Therapy Co- Evaluation and treatment with OT    Patient Name:  Adriana Strong   MRN:  [id number]    Recommendations:     Discharge Recommendations: Moderate Intensity Therapy   Discharge Equipment Recommendations: none   Barriers to discharge: Decreased caregiver support    Assessment:     Adriana Strong is a 79 y.o. female admitted with a medical diagnosis of Acute on chronic diastolic congestive heart failure.  She presents with the following impairments/functional limitations: impaired endurance, impaired functional mobility, gait instability, impaired balance, decreased lower extremity function pt tolerated treatment well but is significant below previous functional level. Pt will benefit from skilled PT 4x/wk to progress physically.  Pt is significantly below previous functional level, increased risk of falls and increased burden of care currently. Patient currently demonstrates a need for moderate intensity therapy on a daily basis post acute secondary to a decline in functional status due to illness. Pt came to ED with c/o SOB and weakness.     Rehab Prognosis: Good; patient would benefit from acute skilled PT services to address these deficits and reach maximum level of function.    Recent Surgery: * No surgery found *      Plan:     During this hospitalization, patient to be seen 4 x/week to address the identified rehab impairments via gait training, therapeutic activities, therapeutic exercises and progress toward the following goals:    Plan of Care Expires:  12/12/24    Subjective     Chief Complaint: pt stated that she was afraid of falling during session.   Patient/Family Comments/goals:  to get better and go home.   Pain/Comfort:  Pain Rating 1: 0/10  Pain Rating Post-Intervention 1: 0/10    Patients cultural, spiritual, Mandaen conflicts given the current situation: no    Living Environment:  Pt is retired and lives with her retired  in 2 story with B&B downstairs and slab  entrance.   Prior to admission, patients level of function was modified Independent with rollator in home and w/c in community. .  Equipment used at home: bedside commode, wheelchair, rollator, cane, straight, oxygen, grab bar, walker, rolling (walk in shower with built in bench, grab bars).  DME owned (not currently used): none.  Upon discharge, patient will have assistance from  but of little physical assist. .    Objective:     Communicated with nurse  prior to session.  Patient found supine with telemetry, pulse ox (continuous), blood pressure cuff, olson catheter, oxygen, peripheral IV  upon PT entry to room.    General Precautions: Standard, fall  Orthopedic Precautions:    Braces:    Respiratory Status: Nasal cannula, flow 2 L/min    Exams:  Cognitive Exam:  Patient is oriented to Person, Place, Time, and Situation  RLE ROM: WFL  RLE Strength: WFL  LLE ROM: WFL  LLE Strength: WFL    Functional Mobility:  Bed Mobility:  pt needed verbal cues for hand placement and sequencing for functional mobility.    Rolling Right: minimum assistance  Supine to Sit: maximal assistance    Transfers:     Sit to Stand:  minimum assistance with rolling walker. Pt stood x 2 reps.   Bed to Chair: moderate assistance with  rolling walker  using  Stand Pivot    Balance: pt sat on EOB with SBA and performed ADLS with OT    Due to pt complex medical condition, the skill of 2 licensed therapists is needed to maximize treatment session and progression towards goals  Pt white board updated with current therapists name and level of mobility assistance needed.         AM-PAC 6 CLICK MOBILITY  Total Score:12       Treatment & Education:  Pt and  received verbal instructions in role of PT and POC. Both verbally expressed understanding of such.     Patient left up in chair with all lines intact, call button in reach, RN  notified, and   present.    GOALS:   Multidisciplinary Problems       Physical Therapy Goals           Problem: Physical Therapy    Goal Priority Disciplines Outcome Interventions   Physical Therapy Goal     PT, PT/OT Progressing    Description: Goals to be met by: 24     Patient will increase functional independence with mobility by performin. Supine to sit with Stand-by Assistance  2. Sit to stand transfer with Stand-by Assistance with RW.   3. Bed to chair transfer with Stand-by Assistance using Rolling Walker  4. Gait  x 100 feet with Stand-by Assistance using Rolling Walker.   5. Lower extremity exercise program x15 reps per handout, with assistance as needed to increase tolerance to activities.                          History:     Past Medical History:   Diagnosis Date    Acute on chronic diastolic (congestive) heart failure 2019    Anticoagulant long-term use     on Plavix since May 2015    Anxiety     Arthritis     Asthma     Atrial fibrillation     Atrial fibrillation with RVR 10/19/2020    Cataracts, bilateral     Chest pain, atypical     Chronic atrial fibrillation with rapid ventricular response 2017    Colon polyp     Coronary artery disease     Diabetes mellitus     Dry eyes     Esophageal erosions     General anesthetics causing adverse effect in therapeutic use     GERD (gastroesophageal reflux disease)     Heart murmur     Hemorrhoid     High cholesterol     Hypertension     Irritable bowel syndrome     Paroxysmal atrial fibrillation 10/30/2020    Persistent atrial fibrillation 02/10/2020    Shingles 2015    Stenosis of aortic and mitral valves     Stroke     TIA (transient ischemic attack)     Use of cane as ambulatory aid        Past Surgical History:   Procedure Laterality Date    ABDOMINAL SURGERY      stents to SMA    angiocele      ,     AORTIC VALVE REPLACEMENT N/A 2019    Procedure: Replacement-valve-aortic;  Surgeon: Jack Capone MD;  Location: [facility] CATH LAB;  Service: Cardiology;  Laterality: N/A;    BREAST SURGERY      left--- a lump--- no  cancer    CARDIAC VALVE SURGERY      COLONOSCOPY  2014    COLONOSCOPY N/A 3/14/2018    Procedure: COLONOSCOPY;  Surgeon: Efren Kemp MD;  [facility]  Service: Endoscopy;  Laterality: N/A;  ok to hold Plavix 5 days prior to procedure per Dr RESHMA Hernandez     ok per Dr Kemp to hold Eliquis 2 days prior to procedure (see telephone encounter dated-2/7/18)    CORONARY ANGIOGRAPHY N/A 2/15/2024    Procedure: ANGIOGRAM, CORONARY ARTERY;  Surgeon: Jos Baptiste MD;  Location: [facility] CATH LAB;  Service: Cardiology;  Laterality: N/A;    HERNIA REPAIR      HYSTERECTOMY  partial    1982 partial hysterectomy    IMPLANTATION OF BIVENTRICULAR PERMANENT PACEMAKER AS UPGRADE TO EXISTING PACEMAKER Left 5/29/2024    Procedure: Biventricular pacemaker upgrade;  Surgeon: Chencho Moeller MD;  Location: [facility] EP LAB;  Service: Cardiology;  Laterality: Left;  CHF, AF,  CRTP ugrd, BIO, MAC, MS, 3prep ** BIO dcPPM in situ/ Contrast Prep**    LEFT HEART CATHETERIZATION Right 11/5/2019    Procedure: Left heart cath;  Surgeon: Jack Capone MD;  Location: [facility] CATH LAB;  Service: Cardiology;  Laterality: Right;    left nasal polyp      left neck lymph node      nose polyp      PHLEBOGRAPHY Left 5/1/2024    Procedure: Venogram;  Surgeon: Chencho Moeller MD;  Location: [facility] EP LAB;  Service: Cardiology;  Laterality: Left;  HF, Venogram ( Lt arm) , MS, 3 prep ** BIO dcPPM in situ/ Contrast Prep**    right hip fatty mass tissue      stent to small intestine      SUPERIOR VENA CAVA ANGIOPLASTY / STENTING      TONSILLECTOMY      TOTAL ABDOMINAL HYSTERECTOMY      2014    TOTAL KNEE ARTHROPLASTY Bilateral     TREATMENT OF CARDIAC ARRHYTHMIA N/A 2/10/2020    Procedure: CARDIOVERSION;  Surgeon: Jayy Parrish MD;  Location: [facility] EP LAB;  Service: Cardiology;  Laterality: N/A;  AF, PEDRO, DCCV, MAC, DM, 3 Prep    TREATMENT OF CARDIAC ARRHYTHMIA N/A 5/15/2020    Procedure: CARDIOVERSION;  Surgeon: Hany Bee MD;  Location: [facility] EP  LAB;  Service: Cardiology;  Laterality: N/A;  AF, PEDRO, DCCV, MAC, DM, 3 Prep    UPPER GASTROINTESTINAL ENDOSCOPY  2014       Time Tracking:     PT Received On: 11/14/24  PT Start Time: 0955     PT Stop Time: 1019  PT Total Time (min): 24 min     Billable Minutes: Therapeutic Activity 16 min  Evaluation 8 min       11/14/2024

## 2024-11-14 NOTE — PLAN OF CARE
MICU DAILY GOALS     Family/Goals of care/Code Status   Code Status: Full Code    24H Vital Sign Range  Temp:  [97.6 °F (36.4 °C)-98.1 °F (36.7 °C)]   Pulse:  [60]   Resp:  [17-34]   BP: (105-197)/(51-88)   SpO2:  [95 %-100 %]      Shift Events (include procedures and significant events)   Patient hypertensive overnight, MD team notified. Restarted home BP meds and addition PRN Antihypertensive medications given. No reports of anxiety this shift. BIPAP worn for 3-4 hours overnight as tolerated. Placed back on Nasal Cannula. Oxygen saturation >95%. Wound care completed as ordered .    AWAKE RASS: Goal - RASS Goal: 0-->alert and calm  Actual - RASS (Mckeon Agitation-Sedation Scale): alert and calm    Restraint necessity: Not necessary   BREATHE SBT: Not intubated    Coordinate A & B, analgesics/sedatives Pain: managed   SAT: Not intubated   Delirium CAM-ICU: Overall CAM-ICU: Negative   Early(intubated/ Progressive (non-intubated) Mobility MOVE Screen (INTUBATED ONLY): Not intubated    Activity: Activity Management: Arm raise - L1, Rolling - L1   Feeding/Nutrition Diet order: Diet/Nutrition Received: consistent carb/diabetic diet, low saturated fat/low cholesterol,     Thrombus DVT prophylaxis: VTE Core Measure: Pharmacological prophylaxis initiated/maintained   HOB Elevation Head of Bed (HOB) Positioning: HOB at 30-45 degrees   Ulcer Prophylaxis GI: yes   Glucose control managed Glycemic Management: blood glucose monitored   Skin Skin assessment:     Sacrum intact/not altered? No  Heels intact/not altered? Yes  Surgical wound? No    CHECK ONE!   (no altered skin or altered skin) and sub boxes:  [] No Altered Skin Integrity Present    []Prevention Measures Documented    [x] Altered Skin Integrity Present or Discovered   [x] LDA present in EPIC, daily doc completed              [] LDA added if not in EPIC (describe wound).                    When describing wound, do not stage, use descriptive words only.    [] Wound  Image Taken (required on admit,                   transfer/discharge and every Tuesday)    Wound Care Consulted? Yes   Bowel Function constipation    Indwelling Catheter Necessity      Urethral Catheter 11/11/24 0127 Non-latex 18 Fr.-Reason for Continuing Urinary Catheterization: Critically ill in ICU and requiring hourly monitoring of intake/output       yes   De-escalation Antibiotics Yes        VS and assessment per flow sheet, patient progressing towards goals as tolerated, plan of care reviewed with family, all concerns addressed, will continue to monitor.

## 2024-11-14 NOTE — PLAN OF CARE
Problem: Physical Therapy  Goal: Physical Therapy Goal  Description: Goals to be met by: 24     Patient will increase functional independence with mobility by performin. Supine to sit with Stand-by Assistance  2. Sit to stand transfer with Stand-by Assistance with RW.   3. Bed to chair transfer with Stand-by Assistance using Rolling Walker  4. Gait  x 100 feet with Stand-by Assistance using Rolling Walker.   5. Lower extremity exercise program x15 reps per handout, with assistance as needed to increase tolerance to activities.     Outcome: Progressing   Evaluation completed and goals appropriate. 2024

## 2024-11-14 NOTE — PROGRESS NOTES
"[facility] - [facility]  Critical Care Medicine  Progress Note    Patient Name: Adriana Strong  MRN: [id number]  Admission Date: 11/10/2024  Hospital Length of Stay: 4 days  Code Status: Full Code  Attending Provider: Amarjit Pierre*  Primary Care Provider: Krzysztof Mcgraw MD   Principal Problem: Acute on chronic diastolic congestive heart failure    Subjective:     HPI:  Adriana Strong is a 79 y.o.F with hx of diastolic HF, Afib on elqiuis, s/p pacemaker, HLD, HTN, non obstructive CAD, s/p TAVR, asthma, COPD, on home oxygen (2L via [place]) who presented to the ED today with complains of worsening SOB. Hx as per  as patient is somnolent and on Bipap at time of interview. Generally she was doing fine in her usual state of health until Friday. She recently had a medication change, she was told to reduce lasix from BID to daily +prn w/ concerns of hyponatremia on OP labs. Denies recent illness. As per chart review: " patient reports compliance with medications, fluid restriction, Na restriction. Also endorsing dysuria for the last couple of days as well as decreased oral intake. Her only complaints today were worsening shortness of breath, weight gain. Denies fever, chills, chest pain, palpitations, abdominal pain, n/v/d, headaches, or any other symptoms at this time. "     In ED: HR 60, Aflutter, AFebrile, /85-->200/75-->150/68, 100% on Bipap. CBC with chronic, stable anemia. Na 128, K 5.6, otherwise CMP unremarkable. Mag 1.5. . Trop negative. TSH 2.559. POC lactate 1.96. UA with 3+ protein, 1+ leukocyte esterase, 17 WBCs, many bacteria, 23 squams. Culture pending. CXR with left pleural effusion and congestive change/edema, developing left lower lung zone consolidation not excluded in this hypoventilatory exam. S/p lasix 80mg inj x2 in ED. VBG PH 7.318 CO2 78.8 O2 94 HCO3 40.4.     Initially admitted to [facility] but stepped up to MICU for rescue bipap in the setting of flash pulmonary edema " and hypercapnic respiratory failure requiring Bipap    Hospital/ICU Course:  Presented for hypoxic respiratory failure and increased work of breathing requiring continued BiPap. Diuresed without improvement in her respiratory status. She underwent thoracentesis on 11/12/24 with 800 cc removed. CT chest with mild edema, multiple small nodules. Will continue to diuresis to euvolemia. Tolerating Bipap qHS. Signifcantly anxious, despite home benzo q6 hours. Will add adjunct therapies as possible.  Restarted on home losartan with improved BP. Adding bowel reg. Likely SD to  11/14.     Interval History/Significant Events:   Htnsive over night, restarted on home losartan.     Patient is afebrile and hemodynamically stable. Tolerating Bipap well qhs. Will continue to diuresis to euvolemia. Reports improved anxiety w/ home benzo q6hr, adjunct and home SNRI and continual diuresis.     Likely SD to        Review of Systems   Constitutional:  Positive for fatigue. Negative for activity change, chills and fever.   HENT:  Negative for trouble swallowing.    Eyes:  Negative for photophobia and visual disturbance.   Respiratory:  Positive for shortness of breath. Negative for cough and chest tightness.    Cardiovascular:  Positive for leg swelling. Negative for chest pain and palpitations.   Gastrointestinal:  Negative for abdominal pain, constipation, diarrhea, nausea and vomiting.   Genitourinary:  Positive for dysuria and frequency. Negative for hematuria.   Musculoskeletal:  Negative for back pain, gait problem and neck pain.   Skin:  Negative for rash and wound.   Neurological:  Negative for dizziness, syncope, speech difficulty and light-headedness.   Psychiatric/Behavioral:  Negative for agitation and confusion. The patient is not nervous/anxious.      Objective:     Vital Signs (Most Recent):  Temp: 97.8 °F (36.6 °C) (11/14/24 0800)  Pulse: 60 (11/14/24 0800)  Resp: (!) 24 (11/14/24 0800)  BP: (!) 110/51 (11/14/24  0800)  SpO2: 100 % (11/14/24 0800) Vital Signs (24h Range):  Temp:  [97.6 °F (36.4 °C)-98.1 °F (36.7 °C)] 97.8 °F (36.6 °C)  Pulse:  [60] 60  Resp:  [17-34] 24  SpO2:  [95 %-100 %] 100 %  BP: (105-197)/(51-88) 110/51   Weight: 93.9 kg (207 lb)  Body mass index is 35.53 kg/m².      Intake/Output Summary (Last 24 hours) at 11/14/2024 0913  Last data filed at 11/14/2024 0600  Gross per 24 hour   Intake 1294.5 ml   Output 1824 ml   Net -529.5 ml          Physical Exam  HENT:      Head: Normocephalic.      Nose: Nose normal.   Eyes:      Extraocular Movements: Extraocular movements intact.   Cardiovascular:      Rate and Rhythm: Normal rate.      Pulses: Normal pulses.   Pulmonary:      Effort: Pulmonary effort is normal. No respiratory distress.   Abdominal:      General: Abdomen is flat.   Musculoskeletal:      Cervical back: Normal range of motion.      Right lower leg: Edema present.      Left lower leg: Edema present.   Skin:     General: Skin is warm.      Capillary Refill: Capillary refill takes less than 2 seconds.   Neurological:      General: No focal deficit present.      Mental Status: She is alert and oriented to person, place, and time. Mental status is at baseline.   Psychiatric:         Mood and Affect: Mood normal.         Behavior: Behavior normal.         Thought Content: Thought content normal.         Judgment: Judgment normal.            Vents:  Oxygen Concentration (%): 24 (11/14/24 0008)  Lines/Drains/Airways       Drain  Duration                  Urethral Catheter 11/11/24 0127 Non-latex 18 Fr. 3 days              Peripheral Intravenous Line  Duration                  Peripheral IV - Single Lumen 11/10/24 1201 18 G Anterior;Left;Proximal Forearm 3 days         Peripheral IV - Single Lumen 11/14/24 0730 22 G Left;Posterior Forearm <1 day                  Significant Labs:    CBC/Anemia Profile:  Recent Labs   Lab 11/13/24  0344 11/14/24  0341   WBC 7.11 8.17   HGB 8.6* 8.7*   HCT 27.4* 27.6*   PLT  294 320   MCV 86 85   RDW 18.1* 17.9*        Chemistries:  Recent Labs   Lab 11/13/24  0344 11/13/24  0902 11/13/24 2026 11/14/24  0040 11/14/24  0341 11/14/24  0744   *   < > 132* 133* 131* 132*   K 3.8   < > 3.7  --  3.6 4.8   CL 85*   < > 85*  --  85* 85*   CO2 36*   < > 37*  --  37* 37*   BUN 18   < > 16  --  18 18   CREATININE 0.8   < > 0.8  --  0.8 0.9   CALCIUM 8.4*   < > 8.3*  --  8.3* 8.4*   ALBUMIN 2.5*  --   --   --  2.5*  --    PROT 5.9*  --   --   --  5.9*  --    BILITOT 0.4  --   --   --  0.4  --    ALKPHOS 38*  --   --   --  39*  --    ALT 14  --   --   --  12  --    AST 17  --   --   --  19  --    MG 1.7  --   --   --  1.5*  --    PHOS 3.6  --   --   --  3.3  --     < > = values in this interval not displayed.       All pertinent labs within the past 24 hours have been reviewed.    Significant Imaging:  I have reviewed all pertinent imaging results/findings within the past 24 hours.    ABG  Recent Labs   Lab 11/11/24  1223   PH 7.388   PO2 91   PCO2 68.0*   HCO3 41.0*   BE 16*     Assessment/Plan:     Psychiatric  Anxiety  Continue Benzo BRN  SNRI  Attarax PRN  Continue Diuresis to Euvolemia    Pulmonary  Pleural effusion  R>L. S/p thoracentesis. Fluid studies with transudate process. No culture results yet     - CT chest w/ GOO, improved effusion.     Acute on chronic respiratory failure with hypoxia  Patient with Hypercapnic and Hypoxic Respiratory failure which is Acute on chronic.  she is on home oxygen at 2 LPM. Supplemental oxygen was provided and noted- Oxygen Concentration (%):  [24] 24    Signs/symptoms of respiratory failure include- increased work of breathing. Contributing diagnoses includes - CHF Labs and images were reviewed. Patient Has recent ABG, which has been reviewed. Will treat underlying causes and adjust management of respiratory failure as follows:    Suspect worsening respiratory 2/2 flash pulmonary edema in the setting of HTN and CHF exacerbation.      --Continue 80  "mg lasix BID  --Continuous BiPAP as needed   --SpO2 goal 88-92% with hx COPD  --Continue supportive anxyolytic    COPD (chronic obstructive pulmonary disease)  --PRN duonebs    Cardiac/Vascular  * Acute on chronic diastolic congestive heart failure    No results for input(s): "BNP" in the last 72 hours.    Latest ECHO  Results for orders placed during the hospital encounter of 09/24/24    Echo      Left Ventricle: The left ventricle is normal in size. Normal wall thickness. There is normal systolic function. Ejection fraction is approximately 65%. There is normal diastolic function.    Right Ventricle: Normal right ventricular cavity size. Wall thickness is normal. Systolic function is normal. Pacemaker lead present in the ventricle.    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 It is reported to be a 26 mm Phan valve. The leaflets appear mildly thickened. Aortic valve area by VTI is 0.7 cm2. LVOT diameter is 1.9 cm. Aortic valve peak velocity is 3.3 m/s. Mean gradient is 33.3 mmHg. The dimensionless index is 0.26. Mild paravalvular regurgitation.    Mitral Valve: There is moderate mitral annular calcification present.    Tricuspid Valve: There is moderate regurgitation.    Pulmonary Artery: There is pulmonary hypertension. The estimated pulmonary artery systolic pressure is 62 mmHg.    IVC/SVC: Intermediate venous pressure at 8 mmHg.    Current Heart Failure Medications  carvediloL tablet 6.25 mg, 2 times daily with meals, Oral  spironolactone tablet 25 mg, Daily, Oral  furosemide injection 80 mg, 2 times daily, Intravenous  hydrALAZINE injection 10 mg, Every 6 hours PRN, Intravenous    --Monitor strict I&Os and daily weights.    --Place on telemetry  --Low sodium diet  -- Diuresis to Euvolemia        CAD (coronary artery disease)  --Continue home statin, plavix    Aortic stenosis  S/p TAVR    Permanent atrial fibrillation  --Continue home eliquis  --Continuous cardiac " monitoring  --EKG PRN  --Replete lytes goal K>4, Mag>2    Essential hypertension  --Continue home oral BP meds  --Volume removal with IV lasix    HLD (hyperlipidemia)  --Continue home statin    Renal/  Acute cystitis without hematuria  Reported dysuria with dark urine.  UA concerning for UTI.      --Enterococcus growing on cultures  --on zosyn; Continue Abx for 5-7 days    Oncology  Anemia  --Trend CBC daily  --Transfuse for Hgb <7 or active bleeding  --Type and screen    Endocrine  Hyponatremia  Likely 2/2 heart failure.  Stable.     --Trend BMP daily  --Lasix for volume removal   --Goal Na correction 4-6mEq in 24 hrs  --Follow up urine studies    Type 2 diabetes mellitus  --Accuchecks ACHS  --SSI, hypoglycemia protocol  --Inpatient BG goal 140-180    GI  Gastroesophageal reflux disease  --Continue PPI       Critical Care Daily Checklist:    A: Awake: RASS Goal/Actual Goal: RASS Goal: 0-->alert and calm  Actual:     B: Spontaneous Breathing Trial Performed?     C: SAT & SBT Coordinated?  N/a                      D: Delirium: CAM-ICU Overall CAM-ICU: Negative   E: Early Mobility Performed? Yes   F: Feeding Goal:    Status:     Current Diet Order   Procedures    Diet diabetic Low Chol/Sat Fat; 2000 Calories (up to 75 gm per meal); Fluid - 1500mL     Order Specific Question:   Additional Diet Options:     Answer:   Low Chol/Sat Fat     Order Specific Question:   Total calories / carbs:     Answer:   2000 Calories (up to 75 gm per meal)     Order Specific Question:   Fluid restriction:     Answer:   Fluid - 1500mL      AS: Analgesia/Sedation PRN   T: Thromboembolic Prophylaxis Yes   H: HOB > 300 Yes   U: Stress Ulcer Prophylaxis (if needed) N/a   G: Glucose Control PRN   B: Bowel Function   Advancing Bowel Reg   I: Indwelling Catheter (Lines & Sims) Necessity PIV/Sims   D: De-escalation of Antimicrobials/Pharmacotherapies Once Terra result    Plan for the day/ETD Possible SD    Code Status:  Family/Goals of Care:  Full Code         Critical secondary to Patient has a condition that poses threat to life and bodily function: Severe Respiratory Distress      Critical care was time spent personally by me on the following activities: development of treatment plan with patient or surrogate and bedside caregivers, discussions with consultants, evaluation of patient's response to treatment, examination of patient, ordering and performing treatments and interventions, ordering and review of laboratory studies, ordering and review of radiographic studies, pulse oximetry, re-evaluation of patient's condition. This critical care time did not overlap with that of any other provider or involve time for any procedures.     Jesus Farris, DO  Critical Care Medicine  [facility]

## 2024-11-14 NOTE — PLAN OF CARE
MICU DAILY GOALS     Family/Goals of care/Code Status   Code Status: Full Code    24H Vital Sign Range  Temp:  [97.5 °F (36.4 °C)-98.1 °F (36.7 °C)]   Pulse:  [60]   Resp:  [16-32]   BP: (127-191)/()   SpO2:  [95 %-100 %]      Shift Events (include procedures and significant events)   No acute events throughout shift PRNS given for anxiety and HTN    AWAKE RASS: Goal - RASS Goal: 0-->alert and calm  Actual - RASS ([place] Agitation-Sedation Scale): alert and calm    Restraint necessity: Not necessary   BREATHE SBT: Not intubated    Coordinate A & B, analgesics/sedatives Pain: managed   SAT: Not intubated   Delirium CAM-ICU: Overall CAM-ICU: Negative   Early(intubated/ Progressive (non-intubated) Mobility MOVE Screen (INTUBATED ONLY): Not intubated    Activity: Activity Management: Arm raise - L1, Rolling - L1   Feeding/Nutrition Diet order: Diet/Nutrition Received: consistent carb/diabetic diet, low saturated fat/low cholesterol,     Thrombus DVT prophylaxis: VTE Core Measure: Pharmacological prophylaxis initiated/maintained   HOB Elevation Head of Bed (HOB) Positioning: HOB at 30-45 degrees   Ulcer Prophylaxis GI: no   Glucose control managed Glycemic Management: blood glucose monitored   Skin Skin assessment:     Sacrum intact/not altered? No  Heels intact/not altered? Yes  Surgical wound? No    CHECK ONE!   (no altered skin or altered skin) and sub boxes:  [] No Altered Skin Integrity Present    []Prevention Measures Documented    [x] Altered Skin Integrity Present or Discovered   [x] LDA present in EPIC, daily doc completed              [] LDA added if not in EPIC (describe wound).                    When describing wound, do not stage, use descriptive words only.    [] Wound Image Taken (required on admit,                   transfer/discharge and every Tuesday)    Wound Care Consulted? Yes   Bowel Function no issues    Indwelling Catheter Necessity      Urethral Catheter 11/11/24 0127 Non-latex 18  Fr.-Reason for Continuing Urinary Catheterization: Critically ill in ICU and requiring hourly monitoring of intake/output          De-escalation Antibiotics Yes        VS and assessment per flow sheet, patient progressing towards goals as tolerated, plan of care reviewed with  patient and family , all concerns addressed, will continue to monitor.

## 2024-11-14 NOTE — HPI
Adriana Strong is an 79 year old female with hx of diastolic HF, Afib on elqiuis, s/p pacemaker, HLD, HTN, non obstructive CAD, s/p TAVR, asthma, COPD, on home oxygen (2L via [place]) who presents to [facility] ED for complaints of worsening shortness of breath, weight gain. Patient reports SOB at rest and with exertion. States she saw cardiology last week where labs were performed. She was notified that her sodium was low and MD requested that patient hold her lasix x3d, continue spironolactone and after the 3d start lasix 40mg daily and additional prn. Otherwise, patient reports compliance with medications, fluid restriction, Na restriction. Also endorsing dysuria for the last couple of days as well as decreased oral intake. Denies fever, chills, chest pain, palpitations, abdominal pain, n/v/d, headaches, or any other symptoms at this time.      In ED: AF, hypertensive, tachypneic on 2-4L via NC. CBC with chronic, stable anemia. Na 128, K 5.6, otherwise CMP unremarkable. Mag 1.5. . Trop negative. TSH 2.559. POC lactate 1.96. UA with 3+ protein, 1+ leukocyte esterase, 17 WBCs, many bacteria, 23 squams. Culture pending. POC US in ED with trace pericardial effusion. CXR with left pleural effusion and congestive change/edema, developing left lower lung zone consolidation not excluded in this hypoventilatory exam. S/p lasix 80mg inj x2 in ED. Admitted to  for further evaluation. Skin integrity RONEY consulted for evaluation of skin injury.

## 2024-11-14 NOTE — RESIDENT HANDOFF
"Handoff     Primary Team: [facility] CRITICAL CARE MEDICINE TEAM 2 Room Number: [id number] [id number]     Patient Name: Adriana Strong MRN: [id number]     Date of Birth: [id number] Allergies: Celebrex [celecoxib], Ciprofloxacin, Dicyclomine, Adhesive, Avelox [moxifloxacin], Cilostazol, Eryc [erythromycin], Iodine and iodide containing products, Keflex [cephalexin], [place], Meloxicam, Metoclopramide, and Sulfa (sulfonamide antibiotics)     Age: 79 y.o. Admit Date: 11/10/2024     Sex: female  BMI: Body mass index is 35.53 kg/m².     Code Status: Full Code        Illness Level (current clinical status): Watcher - No    Reason for Admission: Acute on chronic diastolic congestive heart failure    Brief HPI (pertinent PMH and diagnosis or differential diagnosis):     Adriana Strong is a 79 y.o.F with hx of diastolic HF, Afib on elqiuis, s/p pacemaker, HLD, HTN, non obstructive CAD, s/p TAVR, asthma, COPD, on home oxygen (2L via [place]) who presented to the ED today with complains of worsening SOB. Hx as per  as patient is somnolent and on Bipap at time of interview. Generally she was doing fine in her usual state of health until Friday. She recently had a medication change, she was told to reduce lasix from BID to daily +prn w/ concerns of hyponatremia on OP labs. Denies recent illness. As per chart review: " patient reports compliance with medications, fluid restriction, Na restriction. Also endorsing dysuria for the last couple of days as well as decreased oral intake. Her only complaints today were worsening shortness of breath, weight gain. Denies fever, chills, chest pain, palpitations, abdominal pain, n/v/d, headaches, or any other symptoms at this time. "     In ED: HR 60, Aflutter, AFebrile, /85-->200/75-->150/68, 100% on Bipap. CBC with chronic, stable anemia. Na 128, K 5.6, otherwise CMP unremarkable. Mag 1.5. . Trop negative. TSH 2.559. POC lactate 1.96. UA with 3+ protein, 1+ leukocyte esterase, 17 WBCs, many " bacteria, 23 squams. Culture pending. CXR with left pleural effusion and congestive change/edema, developing left lower lung zone consolidation not excluded in this hypoventilatory exam. S/p lasix 80mg inj x2 in ED. VBG PH 7.318 CO2 78.8 O2 94 HCO3 40.4.      Initially admitted to [facility] but stepped up to MICU for rescue bipap in the setting of flash pulmonary edema and hypercapnic respiratory failure requiring Bipap    Procedure Date: N/A    Hospital Course (updated, brief assessment by system or problem, significant events): Presented for hypoxic respiratory failure and increased work of breathing requiring continued BiPap. Diuresed without improvement in her respiratory status. She underwent thoracentesis on 11/12/24 with 800 cc removed. CT chest with mild edema, multiple small nodules. Will continue to diuresis to euvolemia. Tolerating Bipap qHS. Signifcantly anxious, despite home benzo q6 hours. Will add adjunct therapies as possible. Restarted on home losartan with improved BP. Adding bowel reg. Likely SD to  11/14.     Tasks (specific, using if-then statements):   Continue to diuresis to euvolemia  Optimizing HTN meds  Consider workup for HypoNa, likely from hypervolemia.   ACHF prevention patient and family education.  C/o of intermittent sharp chest pain, possibly pericarditis. ESR/CRP elevated, no Effusion on Echo today. Can consider Cards consults.    Sims removed 11/14, f/u voiding trial.   PT OT recs  If worsening resp failure check volume status and consider diuresis  If worsening anxiety, adjust meds as indicated, consider respiratory causes        Contingency Plan (special circumstances anticipated and plan):     If worsening resp failure check volume status and consider diuresis  If worsening anxiety, adjust meds as indicated, consider respiratory causes      Estimated Discharge Date: 11/16    Discharge Disposition: Home or Self Care    Mentored By: Dr Pierre

## 2024-11-14 NOTE — PLAN OF CARE
Problem: Occupational Therapy  Goal: Occupational Therapy Goal  Description: Goals to be met by: 12/14/24 (1 mo)     Patient will increase functional independence with ADLs by performing:    UE Dressing with [place].  LE Dressing with [place].  Grooming while standing at sink with [place].  Toileting from toilet with [place] for hygiene and clothing management.   Rolling to Bilateral with [place].   Supine to sit with [place].  Step transfer with [place]  Toilet transfer to toilet with [place].    Evaluated pt and established OT POC. Continue OT as tolerated.  Sussy Silverman OT  11/14/2024    Outcome: Progressing

## 2024-11-14 NOTE — TELEPHONE ENCOUNTER
Edilberto Avilez MD to Me        11/8/24  5:38 PM  Note  I have called Tammy.  We discussed.  We will cut the Lasix to 40 mg daily and extra p.r.n..  We will continue spironolactone.  We will get BMP next week.  please arrange the BMP

## 2024-11-14 NOTE — PT/OT/SLP EVAL
Occupational Therapy   Evaluation and Co-Treatment     Name: Adriana Strong  MRN: [id number]  Admitting Diagnosis: Acute on chronic diastolic congestive heart failure  Recent Surgery: * No surgery found *      Recommendations:     Discharge Recommendations: Moderate Intensity Therapy  Discharge Equipment Recommendations:  oxygen, wheelchair, rollator, bedside commode, walker, rolling, bath bench, grab bar  Barriers to discharge:       Assessment:     Adriana Strong is a 79 y.o. female with a medical diagnosis of Acute on chronic diastolic congestive heart failure.  She presents with impaired ADL and mobility performance deficits. Pt found upright in bed and agreeable for therapy. Session focused on EOB ADLs and transfer to chair. Pt with noticeable fear of falling and unsteady despite using a RW.  At this time, pt is a high fall risk and not at their baseline. Prior to hospitalizations, pt was mod I for ADLs/mobility.    Performance deficits affecting function: weakness, gait instability, decreased upper extremity function, impaired endurance, impaired balance, decreased lower extremity function, impaired self care skills, impaired functional mobility, impaired cardiopulmonary response to activity.      Rehab Prognosis: Good; patient would benefit from acute skilled OT services to address these deficits and reach maximum level of function.       Plan:     Patient to be seen 4 x/week to address the above listed problems via self-care/home management, therapeutic activities, therapeutic exercises, neuromuscular re-education  Plan of Care Expires: 12/14/24  Plan of Care Reviewed with: patient, spouse    Subjective     Chief Complaint: none  Patient/Family Comments/goals: to get better    Occupational Profile:  Living Environment: Pt lives with spouse in a two story house with threshold entry. Pt's bed/bath on first floor. Pt has a walk in shower with bath bench (built in) and grab bar.  Previous level of function: Pt  uses a rollator for short distances and uses a w/c for community distances. Pt needs A for bathing, dressing, and some parts of grooming.Pt on 02 at home (2L)  Roles and Routines: Pt home dwelling; enjoys spending time with    Equipment Used at Home: bedside commode, wheelchair, rollator, cane, straight  Assistance upon Discharge:      Pain/Comfort:  Pain Rating 1: 0/10  Pain Rating Post-Intervention 1: 0/10    Patients cultural, spiritual, Oriental orthodox conflicts given the current situation: no    Objective:   Co-treatment with PT for maximal pt participation, safety, and activity tolerance     Communicated with: RN prior to session.  Patient found HOB elevated with oxygen, pulse ox (continuous), telemetry, blood pressure cuff, olson catheter upon OT entry to room.    General Precautions: Standard, fall  Orthopedic Precautions: N/A  Braces: N/A  Respiratory Status: Nasal cannula, flow 2 L/min    Occupational Performance:    Bed Mobility:    Patient completed Rolling/Turning to Right with minimum assistance  Patient completed Scooting/Bridging with max assistance  Patient completed Supine to Sit with maximal assistance    Functional Mobility/Transfers:  Patient completed Sit <> Stand Transfer with minimum assistance  with  rolling walker   Patient completed Bed <> Chair Transfer using Stand Pivot technique with moderate assistance with rolling walker  Functional Mobility: Pt stood with min A using a RW however needed mod A to complete a pivot to bedside chair. Pt completed standing twice, both with min A-RW. Prior to standing, pt completed EOB ADLs with SBA-CGA.     Activities of Daily Living:  Grooming: stand by assistance washing face and brushing teeth at EOB   Upper Body Dressing: moderate assistance donning gown    Cognitive/Visual Perceptual:  Cognitive/Psychosocial Skills:     -       Oriented to: Person, Place, Time, and Situation   -       Follows Commands/attention:Follows multistep  commands  -        Communication: clear/fluent  -       Memory: No Deficits noted  -       Safety awareness/insight to disability: impaired   -       Mood/Affect/Coping skills/emotional control: Anxious    Physical Exam:  Balance:    -       demo good sitting and fair standing balance with RW   Upper Extremity Range of Motion:     -       Right Upper Extremity: WFL  -       Left Upper Extremity: WFL  Upper Extremity Strength:    -       Right Upper Extremity: WFL  -       Left Upper Extremity: WFL   Strength:    -       Right Upper Extremity: WFL  -       Left Upper Extremity: WFL    AMPAC 6 Click ADL:  AMPAC Total Score: 17    Treatment & Education:  Pt educated on role of occupational therapy, POC, and safety during ADLs and functional mobility. Pt and OT discussed importance of safe, continued mobility to optimize daily living skills. Pt verbalized understanding.     White board updated during session. Pt given instruction to call for medical staff/nurse for assistance.       Patient left up in chair with all lines intact, call button in reach, and RN notified    GOALS:   Multidisciplinary Problems       Occupational Therapy Goals          Problem: Occupational Therapy    Goal Priority Disciplines Outcome Interventions   Occupational Therapy Goal     OT, PT/OT Progressing    Description: Goals to be met by: 12/14/24 (1 mo)     Patient will increase functional independence with ADLs by performing:    UE Dressing with Annabella.  LE Dressing with Annabella.  Grooming while standing at sink with Annabella.  Toileting from toilet with Annabella for hygiene and clothing management.   Rolling to Bilateral with Annabella.   Supine to sit with Annabella.  Step transfer with Annabella  Toilet transfer to toilet with Annabella.                         History:     Past Medical History:   Diagnosis Date    Acute on chronic diastolic (congestive) heart failure 11/20/2019    Anticoagulant long-term use     on  Plavix since May 2015    Anxiety     Arthritis     Asthma     Atrial fibrillation     Atrial fibrillation with RVR 10/19/2020    Cataracts, bilateral     Chest pain, atypical     Chronic atrial fibrillation with rapid ventricular response 06/23/2017    Colon polyp     Coronary artery disease     Diabetes mellitus     Dry eyes     Esophageal erosions     General anesthetics causing adverse effect in therapeutic use     GERD (gastroesophageal reflux disease)     Heart murmur     Hemorrhoid     High cholesterol     Hypertension     Irritable bowel syndrome     Paroxysmal atrial fibrillation 10/30/2020    Persistent atrial fibrillation 02/10/2020    Shingles 2015    Stenosis of aortic and mitral valves     Stroke     TIA (transient ischemic attack)     Use of cane as ambulatory aid          Past Surgical History:   Procedure Laterality Date    ABDOMINAL SURGERY      stents to SMA    angiocele      2007, 2014    AORTIC VALVE REPLACEMENT N/A 11/19/2019    Procedure: Replacement-valve-aortic;  Surgeon: Jack Capone MD;  Location: [facility] CATH LAB;  Service: Cardiology;  Laterality: N/A;    BREAST SURGERY      left--- a lump--- no cancer    CARDIAC VALVE SURGERY      COLONOSCOPY  2014    COLONOSCOPY N/A 3/14/2018    Procedure: COLONOSCOPY;  Surgeon: Efren Kemp MD;  Location: [address]);  Service: Endoscopy;  Laterality: N/A;  ok to hold Plavix 5 days prior to procedure per Dr RESHMA Hernandez     ok per Dr Kemp to hold Eliquis 2 days prior to procedure (see telephone encounter dated-2/7/18)    CORONARY ANGIOGRAPHY N/A 2/15/2024    Procedure: ANGIOGRAM, CORONARY ARTERY;  Surgeon: Jos Baptiste MD;  Location: [facility] CATH LAB;  Service: Cardiology;  Laterality: N/A;    HERNIA REPAIR      HYSTERECTOMY  partial    1982 partial hysterectomy    IMPLANTATION OF BIVENTRICULAR PERMANENT PACEMAKER AS UPGRADE TO EXISTING PACEMAKER Left 5/29/2024    Procedure: Biventricular pacemaker upgrade;  Surgeon: Chencho Moeller MD;  Location:  [facility] EP LAB;  Service: Cardiology;  Laterality: Left;  CHF, AF,  CRTP ugrd, BIO, MAC, CT, 3prep ** BIO dcPPM in situ/ Contrast Prep**    LEFT HEART CATHETERIZATION Right 11/5/2019    Procedure: Left heart cath;  Surgeon: Jack Capone MD;  Location: [facility] CATH LAB;  Service: Cardiology;  Laterality: Right;    left nasal polyp      left neck lymph node      nose polyp      PHLEBOGRAPHY Left 5/1/2024    Procedure: Venogram;  Surgeon: Chencho Moeller MD;  Location: [facility] EP LAB;  Service: Cardiology;  Laterality: Left;  HF, Venogram ( Lt arm) , CT, 3 prep ** BIO dcPPM in situ/ Contrast Prep**    right hip fatty mass tissue      stent to small intestine      SUPERIOR VENA CAVA ANGIOPLASTY / STENTING      TONSILLECTOMY      TOTAL ABDOMINAL HYSTERECTOMY      2014    TOTAL KNEE ARTHROPLASTY Bilateral     TREATMENT OF CARDIAC ARRHYTHMIA N/A 2/10/2020    Procedure: CARDIOVERSION;  Surgeon: Jayy Parrish MD;  Location: [facility] EP LAB;  Service: Cardiology;  Laterality: N/A;  AF, PEDRO, DCCV, MAC, DM, 3 Prep    TREATMENT OF CARDIAC ARRHYTHMIA N/A 5/15/2020    Procedure: CARDIOVERSION;  Surgeon: Hany Bee MD;  Location: [facility] EP LAB;  Service: Cardiology;  Laterality: N/A;  AF, PEDRO, DCCV, MAC, DM, 3 Prep    UPPER GASTROINTESTINAL ENDOSCOPY  2014       Time Tracking:     OT Date of Treatment: 11/14/24  OT Start Time: 0955  OT Stop Time: 1019  OT Total Time (min): 24 min    Billable Minutes:Evaluation 12 min  Self Care/Home Management 12 min    11/14/2024

## 2024-11-15 PROBLEM — L24.A0 IRRITANT CONTACT DERMATITIS DUE TO FRICTION OR CONTACT WITH BODY FLUIDS, UNSPECIFIED: Status: ACTIVE | Noted: 2024-11-15

## 2024-11-15 LAB
ALBUMIN SERPL BCP-MCNC: 2.4 G/DL (ref 3.5–5.2)
ALBUMIN SERPL BCP-MCNC: 2.4 G/DL (ref 3.5–5.2)
ALP SERPL-CCNC: 34 U/L (ref 40–150)
ALT SERPL W/O P-5'-P-CCNC: 13 U/L (ref 10–44)
ANION GAP SERPL CALC-SCNC: 10 MMOL/L (ref 8–16)
ANION GAP SERPL CALC-SCNC: 10 MMOL/L (ref 8–16)
ANION GAP SERPL CALC-SCNC: 7 MMOL/L (ref 8–16)
ANION GAP SERPL CALC-SCNC: 9 MMOL/L (ref 8–16)
ANION GAP SERPL CALC-SCNC: 9 MMOL/L (ref 8–16)
AST SERPL-CCNC: 25 U/L (ref 10–40)
BASOPHILS # BLD AUTO: 0.03 K/UL (ref 0–0.2)
BASOPHILS NFR BLD: 0.3 % (ref 0–1.9)
BILIRUB SERPL-MCNC: 0.4 MG/DL (ref 0.1–1)
BUN SERPL-MCNC: 23 MG/DL (ref 8–23)
BUN SERPL-MCNC: 25 MG/DL (ref 8–23)
BUN SERPL-MCNC: 26 MG/DL (ref 8–23)
BUN SERPL-MCNC: 26 MG/DL (ref 8–23)
BUN SERPL-MCNC: 30 MG/DL (ref 8–23)
CALCIUM SERPL-MCNC: 8.3 MG/DL (ref 8.7–10.5)
CALCIUM SERPL-MCNC: 8.3 MG/DL (ref 8.7–10.5)
CALCIUM SERPL-MCNC: 8.4 MG/DL (ref 8.7–10.5)
CALCIUM SERPL-MCNC: 8.5 MG/DL (ref 8.7–10.5)
CALCIUM SERPL-MCNC: 8.7 MG/DL (ref 8.7–10.5)
CHLORIDE SERPL-SCNC: 84 MMOL/L (ref 95–110)
CHLORIDE SERPL-SCNC: 84 MMOL/L (ref 95–110)
CHLORIDE SERPL-SCNC: 85 MMOL/L (ref 95–110)
CHLORIDE SERPL-SCNC: 86 MMOL/L (ref 95–110)
CHLORIDE SERPL-SCNC: 86 MMOL/L (ref 95–110)
CO2 SERPL-SCNC: 34 MMOL/L (ref 23–29)
CO2 SERPL-SCNC: 34 MMOL/L (ref 23–29)
CO2 SERPL-SCNC: 36 MMOL/L (ref 23–29)
CO2 SERPL-SCNC: 36 MMOL/L (ref 23–29)
CO2 SERPL-SCNC: 39 MMOL/L (ref 23–29)
CREAT SERPL-MCNC: 0.8 MG/DL (ref 0.5–1.4)
CREAT SERPL-MCNC: 0.9 MG/DL (ref 0.5–1.4)
CREAT SERPL-MCNC: 1 MG/DL (ref 0.5–1.4)
CREAT SERPL-MCNC: 1.1 MG/DL (ref 0.5–1.4)
CREAT SERPL-MCNC: 1.1 MG/DL (ref 0.5–1.4)
DIFFERENTIAL METHOD BLD: ABNORMAL
EOSINOPHIL # BLD AUTO: 0.1 K/UL (ref 0–0.5)
EOSINOPHIL NFR BLD: 0.5 % (ref 0–8)
ERYTHROCYTE [DISTWIDTH] IN BLOOD BY AUTOMATED COUNT: 17.9 % (ref 11.5–14.5)
EST. GFR  (NO RACE VARIABLE): 50.5 ML/MIN/1.73 M^2
EST. GFR  (NO RACE VARIABLE): 50.5 ML/MIN/1.73 M^2
EST. GFR  (NO RACE VARIABLE): 56.6 ML/MIN/1.73 M^2
EST. GFR  (NO RACE VARIABLE): >60 ML/MIN/1.73 M^2
EST. GFR  (NO RACE VARIABLE): >60 ML/MIN/1.73 M^2
GLUCOSE SERPL-MCNC: 106 MG/DL (ref 70–110)
GLUCOSE SERPL-MCNC: 126 MG/DL (ref 70–110)
GLUCOSE SERPL-MCNC: 126 MG/DL (ref 70–110)
GLUCOSE SERPL-MCNC: 130 MG/DL (ref 70–110)
GLUCOSE SERPL-MCNC: 131 MG/DL (ref 70–110)
HCT VFR BLD AUTO: 27.4 % (ref 37–48.5)
HGB BLD-MCNC: 8.7 G/DL (ref 12–16)
IMM GRANULOCYTES # BLD AUTO: 0.06 K/UL (ref 0–0.04)
IMM GRANULOCYTES NFR BLD AUTO: 0.6 % (ref 0–0.5)
L PNEUMO AG UR QL IA: NEGATIVE
LYMPHOCYTES # BLD AUTO: 0.9 K/UL (ref 1–4.8)
LYMPHOCYTES NFR BLD: 8.3 % (ref 18–48)
MAGNESIUM SERPL-MCNC: 1.8 MG/DL (ref 1.6–2.6)
MCH RBC QN AUTO: 26.8 PG (ref 27–31)
MCHC RBC AUTO-ENTMCNC: 31.8 G/DL (ref 32–36)
MCV RBC AUTO: 84 FL (ref 82–98)
MONOCYTES # BLD AUTO: 1.1 K/UL (ref 0.3–1)
MONOCYTES NFR BLD: 10.9 % (ref 4–15)
NEUTROPHILS # BLD AUTO: 8.3 K/UL (ref 1.8–7.7)
NEUTROPHILS NFR BLD: 79.4 % (ref 38–73)
NRBC BLD-RTO: 0 /100 WBC
PHOSPHATE SERPL-MCNC: 3.1 MG/DL (ref 2.7–4.5)
PHOSPHATE SERPL-MCNC: 3.6 MG/DL (ref 2.7–4.5)
PLATELET # BLD AUTO: 339 K/UL (ref 150–450)
PMV BLD AUTO: 9.9 FL (ref 9.2–12.9)
POCT GLUCOSE: 144 MG/DL (ref 70–110)
POCT GLUCOSE: 150 MG/DL (ref 70–110)
POCT GLUCOSE: 155 MG/DL (ref 70–110)
POCT GLUCOSE: 160 MG/DL (ref 70–110)
POTASSIUM SERPL-SCNC: 4.2 MMOL/L (ref 3.5–5.1)
POTASSIUM SERPL-SCNC: 4.4 MMOL/L (ref 3.5–5.1)
POTASSIUM SERPL-SCNC: 4.4 MMOL/L (ref 3.5–5.1)
POTASSIUM SERPL-SCNC: 4.5 MMOL/L (ref 3.5–5.1)
POTASSIUM SERPL-SCNC: 4.6 MMOL/L (ref 3.5–5.1)
PROT SERPL-MCNC: 6 G/DL (ref 6–8.4)
RBC # BLD AUTO: 3.25 M/UL (ref 4–5.4)
SODIUM SERPL-SCNC: 129 MMOL/L (ref 136–145)
SODIUM SERPL-SCNC: 130 MMOL/L (ref 136–145)
SODIUM SERPL-SCNC: 131 MMOL/L (ref 136–145)
SODIUM SERPL-SCNC: 132 MMOL/L (ref 136–145)
SODIUM SERPL-SCNC: 132 MMOL/L (ref 136–145)
WBC # BLD AUTO: 10.4 K/UL (ref 3.9–12.7)

## 2024-11-15 PROCEDURE — 99900035 HC TECH TIME PER 15 MIN (STAT)

## 2024-11-15 PROCEDURE — 25000003 PHARM REV CODE 250: Performed by: STUDENT IN AN ORGANIZED HEALTH CARE EDUCATION/TRAINING PROGRAM

## 2024-11-15 PROCEDURE — 25000003 PHARM REV CODE 250: Performed by: INTERNAL MEDICINE

## 2024-11-15 PROCEDURE — 63600175 PHARM REV CODE 636 W HCPCS

## 2024-11-15 PROCEDURE — 94761 N-INVAS EAR/PLS OXIMETRY MLT: CPT

## 2024-11-15 PROCEDURE — 63600175 PHARM REV CODE 636 W HCPCS: Performed by: INTERNAL MEDICINE

## 2024-11-15 PROCEDURE — 97535 SELF CARE MNGMENT TRAINING: CPT

## 2024-11-15 PROCEDURE — 25000003 PHARM REV CODE 250

## 2024-11-15 PROCEDURE — 94799 UNLISTED PULMONARY SVC/PX: CPT

## 2024-11-15 PROCEDURE — 97116 GAIT TRAINING THERAPY: CPT

## 2024-11-15 PROCEDURE — 99233 SBSQ HOSP IP/OBS HIGH 50: CPT | Mod: GC,,, | Performed by: INTERNAL MEDICINE

## 2024-11-15 PROCEDURE — 84100 ASSAY OF PHOSPHORUS: CPT

## 2024-11-15 PROCEDURE — 85025 COMPLETE CBC W/AUTO DIFF WBC: CPT

## 2024-11-15 PROCEDURE — 36415 COLL VENOUS BLD VENIPUNCTURE: CPT | Mod: XB | Performed by: STUDENT IN AN ORGANIZED HEALTH CARE EDUCATION/TRAINING PROGRAM

## 2024-11-15 PROCEDURE — 27100171 HC OXYGEN HIGH FLOW UP TO 24 HOURS

## 2024-11-15 PROCEDURE — 83735 ASSAY OF MAGNESIUM: CPT

## 2024-11-15 PROCEDURE — 94660 CPAP INITIATION&MGMT: CPT

## 2024-11-15 PROCEDURE — 21400001 HC TELEMETRY ROOM

## 2024-11-15 PROCEDURE — 80048 BASIC METABOLIC PNL TOTAL CA: CPT | Mod: XB | Performed by: STUDENT IN AN ORGANIZED HEALTH CARE EDUCATION/TRAINING PROGRAM

## 2024-11-15 PROCEDURE — 80053 COMPREHEN METABOLIC PANEL: CPT

## 2024-11-15 PROCEDURE — 84295 ASSAY OF SERUM SODIUM: CPT

## 2024-11-15 PROCEDURE — 36415 COLL VENOUS BLD VENIPUNCTURE: CPT

## 2024-11-15 RX ADMIN — SENNOSIDES 8.6 MG: 8.6 TABLET, FILM COATED ORAL at 08:11

## 2024-11-15 RX ADMIN — PIPERACILLIN SODIUM AND TAZOBACTAM SODIUM 4.5 G: 4; .5 INJECTION, POWDER, FOR SOLUTION INTRAVENOUS at 08:11

## 2024-11-15 RX ADMIN — DOXYCYCLINE HYCLATE 100 MG: 100 TABLET, COATED ORAL at 08:11

## 2024-11-15 RX ADMIN — POLYETHYLENE GLYCOL 3350 17 G: 17 POWDER, FOR SOLUTION ORAL at 08:11

## 2024-11-15 RX ADMIN — ACETAMINOPHEN 650 MG: 325 TABLET ORAL at 02:11

## 2024-11-15 RX ADMIN — APIXABAN 5 MG: 5 TABLET, FILM COATED ORAL at 08:11

## 2024-11-15 RX ADMIN — PIPERACILLIN SODIUM AND TAZOBACTAM SODIUM 4.5 G: 4; .5 INJECTION, POWDER, FOR SOLUTION INTRAVENOUS at 04:11

## 2024-11-15 RX ADMIN — FUROSEMIDE 80 MG: 10 INJECTION, SOLUTION INTRAVENOUS at 05:11

## 2024-11-15 RX ADMIN — FUROSEMIDE 80 MG: 10 INJECTION, SOLUTION INTRAVENOUS at 08:11

## 2024-11-15 RX ADMIN — CARVEDILOL 12.5 MG: 12.5 TABLET, FILM COATED ORAL at 07:11

## 2024-11-15 RX ADMIN — ATORVASTATIN CALCIUM 10 MG: 10 TABLET, FILM COATED ORAL at 07:11

## 2024-11-15 RX ADMIN — ACETAMINOPHEN 650 MG: 325 TABLET ORAL at 05:11

## 2024-11-15 RX ADMIN — RANOLAZINE 1000 MG: 500 TABLET, EXTENDED RELEASE ORAL at 08:11

## 2024-11-15 RX ADMIN — CARVEDILOL 12.5 MG: 12.5 TABLET, FILM COATED ORAL at 05:11

## 2024-11-15 RX ADMIN — OXYCODONE HYDROCHLORIDE AND ACETAMINOPHEN 1000 MG: 500 TABLET ORAL at 07:11

## 2024-11-15 RX ADMIN — DULOXETINE HYDROCHLORIDE 20 MG: 20 CAPSULE, DELAYED RELEASE ORAL at 07:11

## 2024-11-15 RX ADMIN — ISOSORBIDE MONONITRATE 60 MG: 60 TABLET, EXTENDED RELEASE ORAL at 07:11

## 2024-11-15 RX ADMIN — PANTOPRAZOLE SODIUM 40 MG: 40 TABLET, DELAYED RELEASE ORAL at 07:11

## 2024-11-15 RX ADMIN — GABAPENTIN 300 MG: 300 CAPSULE ORAL at 08:11

## 2024-11-15 RX ADMIN — PIPERACILLIN SODIUM AND TAZOBACTAM SODIUM 4.5 G: 4; .5 INJECTION, POWDER, FOR SOLUTION INTRAVENOUS at 03:11

## 2024-11-15 RX ADMIN — MONTELUKAST 10 MG: 10 TABLET, FILM COATED ORAL at 07:11

## 2024-11-15 RX ADMIN — Medication: at 08:11

## 2024-11-15 RX ADMIN — SPIRONOLACTONE 25 MG: 25 TABLET ORAL at 07:11

## 2024-11-15 RX ADMIN — LOSARTAN POTASSIUM 50 MG: 50 TABLET, FILM COATED ORAL at 07:11

## 2024-11-15 RX ADMIN — Medication: at 11:11

## 2024-11-15 RX ADMIN — CLOPIDOGREL BISULFATE 75 MG: 75 TABLET ORAL at 07:11

## 2024-11-15 NOTE — PROGRESS NOTES
"[facility] - Stepdown Flex ([place] [id number])  Critical Care Medicine  Progress Note    Patient Name: Adriana Strong  MRN: [id number]  Admission Date: 11/10/2024  Hospital Length of Stay: 5 days  Code Status: Full Code  Attending Provider: Bradley Rivera MD  Primary Care Provider: Krzysztof Mcgraw MD   Principal Problem: Acute on chronic diastolic congestive heart failure    Subjective:     HPI:  Adriana Strong is a 79 y.o.F with hx of diastolic HF, Afib on elqiuis, s/p pacemaker, HLD, HTN, non obstructive CAD, s/p TAVR, asthma, COPD, on home oxygen (2L via [place]) who presented to the ED today with complains of worsening SOB. Hx as per  as patient is somnolent and on Bipap at time of interview. Generally she was doing fine in her usual state of health until Friday. She recently had a medication change, she was told to reduce lasix from BID to daily +prn w/ concerns of hyponatremia on OP labs. Denies recent illness. As per chart review: " patient reports compliance with medications, fluid restriction, Na restriction. Also endorsing dysuria for the last couple of days as well as decreased oral intake. Her only complaints today were worsening shortness of breath, weight gain. Denies fever, chills, chest pain, palpitations, abdominal pain, n/v/d, headaches, or any other symptoms at this time. "     In ED: HR 60, Aflutter, AFebrile, /85-->200/75-->150/68, 100% on Bipap. CBC with chronic, stable anemia. Na 128, K 5.6, otherwise CMP unremarkable. Mag 1.5. . Trop negative. TSH 2.559. POC lactate 1.96. UA with 3+ protein, 1+ leukocyte esterase, 17 WBCs, many bacteria, 23 squams. Culture pending. CXR with left pleural effusion and congestive change/edema, developing left lower lung zone consolidation not excluded in this hypoventilatory exam. S/p lasix 80mg inj x2 in ED. VBG PH 7.318 CO2 78.8 O2 94 HCO3 40.4.     Initially admitted to [facility] but stepped up to MICU for rescue bipap in the setting of flash " pulmonary edema and hypercapnic respiratory failure requiring Bipap    Hospital/ICU Course:  Presented for hypoxic respiratory failure and increased work of breathing requiring continued BiPap. Diuresed without improvement in her respiratory status. She underwent thoracentesis on 11/12/24 with 800 cc removed. CT chest with mild edema, multiple small nodules. Will continue to diuresis to euvolemia. Tolerating Bipap qHS. Signifcantly anxious, despite home benzo q6 hours. Will add adjunct therapies as possible.  Restarted on home losartan with improved BP. Adding bowel reg. Likely SD to HM 11/14.     Interval History/Significant Events: NAEON    Review of Systems   Constitutional:  Negative for activity change, chills and fever.   HENT:  Negative for trouble swallowing.    Eyes:  Negative for photophobia and visual disturbance.   Respiratory:  Negative for cough and chest tightness.    Cardiovascular:  Positive for leg swelling. Negative for chest pain and palpitations.   Gastrointestinal:  Negative for abdominal pain, constipation, diarrhea, nausea and vomiting.   Genitourinary:  Positive for frequency. Negative for hematuria.   Musculoskeletal:  Negative for back pain, gait problem and neck pain.   Skin:  Negative for rash and wound.   Neurological:  Negative for dizziness, syncope, speech difficulty and light-headedness.   Psychiatric/Behavioral:  Negative for agitation and confusion. The patient is not nervous/anxious.      Objective:     Vital Signs (Most Recent):  Temp: 98.1 °F (36.7 °C) (11/15/24 1252)  Pulse: 60 (11/15/24 1309)  Resp: 17 (11/15/24 1309)  BP: (!) 120/58 (11/15/24 1252)  SpO2: 100 % (11/15/24 1309) Vital Signs (24h Range):  Temp:  [97.7 °F (36.5 °C)-98.1 °F (36.7 °C)] 98.1 °F (36.7 °C)  Pulse:  [50-60] 60  Resp:  [17-33] 17  SpO2:  [94 %-100 %] 100 %  BP: (107-152)/(48-82) 120/58   Weight: 90.8 kg (200 lb 2.8 oz)  Body mass index is 34.36 kg/m².      Intake/Output Summary (Last 24 hours) at  11/15/2024 1324  Last data filed at 11/15/2024 0928  Gross per 24 hour   Intake 1368.34 ml   Output 975 ml   Net 393.34 ml          Physical Exam  Vitals and nursing note reviewed.   HENT:      Head: Normocephalic.      Right Ear: External ear normal.      Left Ear: External ear normal.   Eyes:      Extraocular Movements: Extraocular movements intact.   Cardiovascular:      Rate and Rhythm: Normal rate.      Pulses: Normal pulses.   Pulmonary:      Effort: Pulmonary effort is normal. No respiratory distress.   Abdominal:      Palpations: Abdomen is soft.   Musculoskeletal:      Cervical back: Normal range of motion.      Right lower leg: Edema present.      Left lower leg: Edema present.   Skin:     General: Skin is warm.   Neurological:      Mental Status: She is alert and oriented to person, place, and time. Mental status is at baseline.   Psychiatric:         Behavior: Behavior normal.            Vents:  Oxygen Concentration (%): 24 (11/15/24 0200)  Lines/Drains/Airways       Drain  Duration                  Urethral Catheter 11/11/24 0127 Non-latex 18 Fr. 4 days              Peripheral Intravenous Line  Duration                  Peripheral IV - Single Lumen 11/14/24 0730 22 G Left;Posterior Forearm 1 day         Peripheral IV - Single Lumen 11/14/24 0955 20 G Anterior;Right Shoulder 1 day                  Significant Labs:    CBC/Anemia Profile:  Recent Labs   Lab 11/14/24  0341 11/15/24  0308   WBC 8.17 10.40   HGB 8.7* 8.7*   HCT 27.6* 27.4*    339   MCV 85 84   RDW 17.9* 17.9*        Chemistries:  Recent Labs   Lab 11/14/24  0341 11/14/24  0744 11/14/24  2311 11/15/24  0308 11/15/24  0855 11/15/24  1149   *   < > 129*  129* 129* 130* 131*   K 3.6   < > 4.4  4.4 4.5 4.2  --    CL 85*   < > 84*  84* 85* 86*  --    CO2 37*   < > 36*  36* 34* 34*  --    BUN 18   < > 26*  26* 25* 23  --    CREATININE 0.8   < > 1.1  1.1 0.9 0.8  --    CALCIUM 8.3*   < > 8.3*  8.3* 8.5* 8.7  --    ALBUMIN 2.5*   "--  2.4* 2.4*  --   --    PROT 5.9*  --   --  6.0  --   --    BILITOT 0.4  --   --  0.4  --   --    ALKPHOS 39*  --   --  34*  --   --    ALT 12  --   --  13  --   --    AST 19  --   --  25  --   --    MG 1.5*  --   --  1.8  --   --    PHOS 3.3  --  3.6 3.1  --   --     < > = values in this interval not displayed.       Coagulation: No results for input(s): "PT", "INR", "APTT" in the last 48 hours.  Lactic Acid: No results for input(s): "LACTATE" in the last 48 hours.  Troponin: No results for input(s): "TROPONINI" in the last 48 hours.    Significant Imaging:  I have reviewed all pertinent imaging results/findings within the past 24 hours.    ABG  Recent Labs   Lab 11/11/24  1223   PH 7.388   PO2 91   PCO2 68.0*   HCO3 41.0*   BE 16*     Assessment/Plan:     Psychiatric  Anxiety  Continue Benzo BRN  SNRI  Attarax PRN  Continue Diuresis to Euvolemia    Pulmonary  Pleural effusion  R>L. S/p thoracentesis. Fluid studies with transudate process. No culture results yet     - CT chest w/ GOO, improved effusion.     Acute on chronic respiratory failure with hypoxia  Patient with Hypercapnic and Hypoxic Respiratory failure which is Acute on chronic.  she is on home oxygen at 2 LPM. Supplemental oxygen was provided and noted- Oxygen Concentration (%):  [24] 24    Signs/symptoms of respiratory failure include- increased work of breathing. Contributing diagnoses includes - CHF Labs and images were reviewed. Patient Has recent ABG, which has been reviewed. Will treat underlying causes and adjust management of respiratory failure as follows:    Suspect worsening respiratory 2/2 flash pulmonary edema in the setting of HTN and CHF exacerbation.      --Continue 80 mg lasix BID  --Continuous BiPAP as needed   --SpO2 goal 88-92% with hx COPD  --Continue supportive anxyolytic    COPD (chronic obstructive pulmonary disease)  --PRN duonebs    Cardiac/Vascular  * Acute on chronic diastolic congestive heart failure    No results for " "input(s): "BNP" in the last 72 hours.    Latest ECHO  Results for orders placed during the hospital encounter of 09/24/24    Echo      Left Ventricle: The left ventricle is normal in size. Normal wall thickness. There is normal systolic function. Ejection fraction is approximately 65%. There is normal diastolic function.    Right Ventricle: Normal right ventricular cavity size. Wall thickness is normal. Systolic function is normal. Pacemaker lead present in the ventricle.    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 It is reported to be a 26 mm Phan valve. The leaflets appear mildly thickened. Aortic valve area by VTI is 0.7 cm2. LVOT diameter is 1.9 cm. Aortic valve peak velocity is 3.3 m/s. Mean gradient is 33.3 mmHg. The dimensionless index is 0.26. Mild paravalvular regurgitation.    Mitral Valve: There is moderate mitral annular calcification present.    Tricuspid Valve: There is moderate regurgitation.    Pulmonary Artery: There is pulmonary hypertension. The estimated pulmonary artery systolic pressure is 62 mmHg.    IVC/SVC: Intermediate venous pressure at 8 mmHg.    Current Heart Failure Medications  carvediloL tablet 6.25 mg, 2 times daily with meals, Oral  spironolactone tablet 25 mg, Daily, Oral  furosemide injection 80 mg, 2 times daily, Intravenous  hydrALAZINE injection 10 mg, Every 6 hours PRN, Intravenous    --Monitor strict I&Os and daily weights.    --Place on telemetry  --Low sodium diet  -- Diuresis to Euvolemia        CAD (coronary artery disease)  --Continue home statin, plavix    Aortic stenosis  S/p TAVR    Permanent atrial fibrillation  --Continue home eliquis  --Continuous cardiac monitoring  --EKG PRN  --Replete lytes goal K>4, Mag>2    Essential hypertension  --Continue home oral BP meds  --Volume removal with IV lasix    HLD (hyperlipidemia)  --Continue home statin    Renal/  Acute cystitis without hematuria  Reported dysuria with dark urine.  " UA concerning for UTI.      --Enterococcus growing on cultures  --on zosyn; Continue Abx for 5-7 days    Oncology  Anemia  --Trend CBC daily  --Transfuse for Hgb <7 or active bleeding  --Type and screen    Endocrine  Hyponatremia  Likely 2/2 heart failure.  Stable.     --Trend BMP daily  --Lasix for volume removal   --Goal Na correction 4-6mEq in 24 hrs  --Follow up urine studies    Type 2 diabetes mellitus  --Accuchecks ACHS  --SSI, hypoglycemia protocol  --Inpatient BG goal 140-180    GI  Gastroesophageal reflux disease  --Continue PPI       Critical Care Daily Checklist:    A: Awake: RASS Goal/Actual Goal: RASS Goal: 0-->alert and calm  Actual:     B: Spontaneous Breathing Trial Performed?     C: SAT & SBT Coordinated?  N/A                      D: Delirium: CAM-ICU Overall CAM-ICU: Negative   E: Early Mobility Performed? No   F: Feeding Goal:    Status:     Current Diet Order   Procedures    Diet diabetic Low Chol/Sat Fat; 2000 Calories (up to 75 gm per meal); Fluid - 1500mL     Order Specific Question:   Additional Diet Options:     Answer:   Low Chol/Sat Fat     Order Specific Question:   Total calories / carbs:     Answer:   2000 Calories (up to 75 gm per meal)     Order Specific Question:   Fluid restriction:     Answer:   Fluid - 1500mL      AS: Analgesia/Sedation N/A   T: Thromboembolic Prophylaxis Apixiban   H: HOB > 300 Yes   U: Stress Ulcer Prophylaxis (if needed) PPI   G: Glucose Control -161   B: Bowel Function  Senna & Miralax   I: Indwelling Catheter (Lines & Sims) Necessity Sims   D: De-escalation of Antimicrobials/Pharmacotherapies Per HM    Plan for the day/ETD Step-down    Code Status:  Family/Goals of Care: Full Code  N/A       Critical secondary to Patient has a condition that poses threat to life and bodily function: Acute/Chronic Hypoxemic Respiratory Failure      Critical care was time spent personally by me on the following activities: development of treatment plan with patient or  surrogate and bedside caregivers, discussions with consultants, evaluation of patient's response to treatment, examination of patient, ordering and performing treatments and interventions, ordering and review of laboratory studies, ordering and review of radiographic studies, pulse oximetry, re-evaluation of patient's condition. This critical care time did not overlap with that of any other provider or involve time for any procedures.     Chencho Mcpherson MD  Critical Care Medicine  [facility] - Stepdown Flex ([place])

## 2024-11-15 NOTE — CONSULTS
[facility] ([place])  Skin Integrity RONEY  Consult Note    Patient Name: Adriana Strong  MRN: [id number]  Admission Date: 11/10/2024  Hospital Length of Stay: 5 days  Attending Physician: Bradley Rivera MD  Primary Care Provider: Krzysztof Mcgraw MD     Inpatient consult to Skin Integrity  Practitioner  Consult performed by: Leslee Woo, NP  Consult ordered by: Amarjit Pierre MD        Subjective:     History of Present Illness:  Adriana Strong is an 79 year old female with hx of diastolic HF, Afib on elqiuis, s/p pacemaker, HLD, HTN, non obstructive CAD, s/p TAVR, asthma, COPD, on home oxygen (2L via NC) who presents to [facility] [place] for complaints of worsening shortness of breath, weight gain. Patient reports SOB at rest and with exertion. States she saw cardiology last week where labs were performed. She was notified that her sodium was low and MD requested that patient hold her lasix x3d, continue spironolactone and after the 3d start lasix 40mg daily and additional prn. Otherwise, patient reports compliance with medications, fluid restriction, Na restriction. Also endorsing dysuria for the last couple of days as well as decreased oral intake. Denies fever, chills, chest pain, palpitations, abdominal pain, n/v/d, headaches, or any other symptoms at this time.      In ED: AF, hypertensive, tachypneic on 2-4L via NC. CBC with chronic, stable anemia. Na 128, K 5.6, otherwise CMP unremarkable. Mag 1.5. . Trop negative. TSH 2.559. POC lactate 1.96. UA with 3+ protein, 1+ leukocyte esterase, 17 WBCs, many bacteria, 23 squams. Culture pending. POC US in ED with trace pericardial effusion. CXR with left pleural effusion and congestive change/edema, developing left lower lung zone consolidation not excluded in this hypoventilatory exam. S/p lasix 80mg inj x2 in ED. Admitted to  for further evaluation. Skin integrity RONEY consulted for evaluation of skin injury.    Scheduled Meds:    apixaban  5 mg Oral BID    ascorbic acid (vitamin C)  1,000 mg Oral Daily    atorvastatin  10 mg Oral Daily    balsam peru-castor oiL   Topical (Top) BID    carvediloL  12.5 mg Oral BID WM    clopidogreL  75 mg Oral Daily    doxycycline IV (PEDS and ADULTS)  100 mg Intravenous Q12H    DULoxetine  20 mg Oral Daily    furosemide (LASIX) injection  80 mg Intravenous BID    gabapentin  300 mg Oral QHS    isosorbide mononitrate  60 mg Oral Daily    losartan  50 mg Oral Daily    montelukast  10 mg Oral Daily    pantoprazole  40 mg Oral Daily    piperacillin-tazobactam (Zosyn) IV (PEDS and ADULTS) (extended infusion is not appropriate)  4.5 g Intravenous Q8H    polyethylene glycol  17 g Oral BID    ranolazine  1,000 mg Oral BID    senna  8.6 mg Oral BID    spironolactone  25 mg Oral Daily     Continuous Infusions:  PRN Meds:  Current Facility-Administered Medications:     acetaminophen, 650 mg, Oral, Q4H PRN    albuterol-ipratropium, 3 mL, Nebulization, Q4H PRN    dextrose 10%, 12.5 g, Intravenous, PRN    dextrose 10%, 25 g, Intravenous, PRN    glucagon (human recombinant), 1 mg, Intramuscular, PRN    glucose, 16 g, Oral, PRN    glucose, 24 g, Oral, PRN    hydrALAZINE, 10 mg, Intravenous, Q6H PRN    hydrOXYzine HCL, 25 mg, Oral, TID PRN    insulin aspart U-100, 0-5 Units, Subcutaneous, QID (AC + HS) PRN    LORazepam, 0.5 mg, Oral, Q6H PRN    melatonin, 6 mg, Oral, Nightly PRN    naloxone, 0.02 mg, Intravenous, PRN    ondansetron, 8 mg, Oral, Q8H PRN    polyethylene glycol, 17 g, Oral, Daily PRN    prochlorperazine, 5 mg, Intravenous, Q4H PRN    sodium chloride 0.9%, 10 mL, Intravenous, PRN    sodium chloride 0.9%, 10 mL, Intravenous, Q12H PRN    sodium chloride 0.9%, 10 mL, Intravenous, PRN    white petrolatum, , Topical (Top), PRN    Review of patient's allergies indicates:   Allergen Reactions    Celebrex [celecoxib] Shortness Of Breath    Ciprofloxacin Swelling     lip    Dicyclomine Hives    Adhesive Dermatitis     Avelox [moxifloxacin] Swelling    Cilostazol Other (See Comments)     Elevates blood pressure    Eryc [erythromycin] Other (See Comments)     unknown    Iodine and iodide containing products Hives    Keflex [cephalexin] Hives     Tolerated ceftriaxone (11/11/2024)    Meclomen      rashes    Meloxicam      Ear ringing    Metoclopramide Other (See Comments)     High blood pressure    Sulfa (sulfonamide antibiotics) Itching        Past Medical History:   Diagnosis Date    Acute on chronic diastolic (congestive) heart failure 11/20/2019    Anticoagulant long-term use     on Plavix since May 2015    Anxiety     Arthritis     Asthma     Atrial fibrillation     Atrial fibrillation with RVR 10/19/2020    Cataracts, bilateral     Chest pain, atypical     Chronic atrial fibrillation with rapid ventricular response 06/23/2017    Colon polyp     Coronary artery disease     Diabetes mellitus     Dry eyes     Esophageal erosions     General anesthetics causing adverse effect in therapeutic use     GERD (gastroesophageal reflux disease)     Heart murmur     Hemorrhoid     High cholesterol     Hypertension     Irritable bowel syndrome     Paroxysmal atrial fibrillation 10/30/2020    Persistent atrial fibrillation 02/10/2020    Shingles 2015    Stenosis of aortic and mitral valves     Stroke     TIA (transient ischemic attack)     Use of cane as ambulatory aid      Past Surgical History:   Procedure Laterality Date    ABDOMINAL SURGERY      stents to SMA    angiocele      2007, 2014    AORTIC VALVE REPLACEMENT N/A 11/19/2019    Procedure: Replacement-valve-aortic;  Surgeon: Jack Capone MD;  Location: [facility] CATH LAB;  Service: Cardiology;  Laterality: N/A;    BREAST SURGERY      left--- a lump--- no cancer    CARDIAC VALVE SURGERY      COLONOSCOPY  2014    COLONOSCOPY N/A 3/14/2018    Procedure: COLONOSCOPY;  Surgeon: Efren Kemp MD;  [facility]  Service: Endoscopy;  Laterality: N/A;  ok to hold Plavix 5 days  prior to procedure per Dr RESHMA Hernandez     ok per Dr Kemp to hold Eliquis 2 days prior to procedure (see telephone encounter dated-2/7/18)    CORONARY ANGIOGRAPHY N/A 2/15/2024    Procedure: ANGIOGRAM, CORONARY ARTERY;  Surgeon: Jos Baptiste MD;  Location: [facility] CATH LAB;  Service: Cardiology;  Laterality: N/A;    HERNIA REPAIR      HYSTERECTOMY  partial    1982 partial hysterectomy    IMPLANTATION OF BIVENTRICULAR PERMANENT PACEMAKER AS UPGRADE TO EXISTING PACEMAKER Left 5/29/2024    Procedure: Biventricular pacemaker upgrade;  Surgeon: Chencho Moeller MD;  Location: [facility] EP LAB;  Service: Cardiology;  Laterality: Left;  CHF, AF,  CRTP ugrd, BIO, MAC, WA, 3prep ** BIO dcPPM in situ/ Contrast Prep**    LEFT HEART CATHETERIZATION Right 11/5/2019    Procedure: Left heart cath;  Surgeon: Jack Capone MD;  Location: [facility] CATH LAB;  Service: Cardiology;  Laterality: Right;    left nasal polyp      left neck lymph node      nose polyp      PHLEBOGRAPHY Left 5/1/2024    Procedure: Venogram;  Surgeon: Chencho Moeller MD;  Location: [facility] EP LAB;  Service: Cardiology;  Laterality: Left;  HF, Venogram ( Lt arm) , WA, 3 prep ** BIO dcPPM in situ/ Contrast Prep**    right hip fatty mass tissue      stent to small intestine      SUPERIOR VENA CAVA ANGIOPLASTY / STENTING      TONSILLECTOMY      TOTAL ABDOMINAL HYSTERECTOMY      2014    TOTAL KNEE ARTHROPLASTY Bilateral     TREATMENT OF CARDIAC ARRHYTHMIA N/A 2/10/2020    Procedure: CARDIOVERSION;  Surgeon: Jayy Parrish MD;  Location: [facility] EP LAB;  Service: Cardiology;  Laterality: N/A;  AF, PEDRO, DCCV, MAC, DM, 3 Prep    TREATMENT OF CARDIAC ARRHYTHMIA N/A 5/15/2020    Procedure: CARDIOVERSION;  Surgeon: Hany Bee MD;  Location: [facility] EP LAB;  Service: Cardiology;  Laterality: N/A;  AF, PEDRO, DCCV, MAC, DM, 3 Prep    UPPER GASTROINTESTINAL ENDOSCOPY  2014       Family History       Problem Relation (Age of Onset)    Heart attack Mother    Heart failure Brother    Stroke  Father          Tobacco Use    Smoking status: Never    Smokeless tobacco: Never   Substance and Sexual Activity    Alcohol use: No    Drug use: Never    Sexual activity: Not Currently     Partners: Male     Review of Systems   Skin:  Positive for wound.       Objective:     Vital Signs (Most Recent):  Temp: 97.8 °F (36.6 °C) (11/14/24 0800)  Pulse: 60 (11/14/24 1100)  Resp: (!) 22 (11/14/24 1100)  BP: (!) 97/46 (11/14/24 1100)  SpO2: 100 % (11/14/24 1100) Vital Signs (24h Range):  Temp:  [97.6 °F (36.4 °C)-98 °F (36.7 °C)] 97.8 °F (36.6 °C)  Pulse:  [60] 60  Resp:  [17-34] 22  SpO2:  [95 %-100 %] 100 %  BP: ()/(46-88) 97/46     Weight: 93.9 kg (207 lb)  Body mass index is 35.53 kg/m².     Physical Exam  Constitutional:       Appearance: Normal appearance.   Skin:     General: Skin is warm and dry.      Findings: Lesion present.   Neurological:      Mental Status: She is alert.          Laboratory:  All pertinent labs reviewed within the last 24 hours.    Diagnostic Results:  None      Assessment/Plan:              RONYE Skin Integrity Evaluation      Skin Integrity RONEY evaluation of patient as part of the comprehensive skin care team.     She has been admitted for 4 days. Skin injury was noted on 11/11/24. POA yes.    Sacrum/R buttock        Derm  Irritant contact dermatitis due to friction or contact with body fluids, unspecified  - consult received for evaluation of skin injury.  - [place] [facility] [place] complaints of worsening shortness of breath, weight gain.  - small partial thickness tissue loss to right sacral/buttocks with surrounding darkened discoloration.   - area appears darker in media vs in person. Unsure if natural discoloration or deep tissue injury related.  - will follow for wound progression.  - continue BPCO bid/prn.  - Elli surface.  - olson catheter in place.  - heel boots and pillows for offloading.  - pt turns well with minimal assistance. Turn q2h.  - troy score documented at 15  with a nutrition score of 3.  - nursing to maintain pressure injury prevention measures and continue wound care per orders.         Thank you for your consult. I will follow-up with patient. Please contact us if you have any additional questions.      Leslee Woo NP  Skin Integrity RONEY  [facility] ([place])

## 2024-11-15 NOTE — PLAN OF CARE
MICU DAILY GOALS     Family/Goals of care/Code Status   Code Status: Full Code    24H Vital Sign Range  Temp:  [97.6 °F (36.4 °C)-98 °F (36.7 °C)]   Pulse:  [60]   Resp:  [17-30]   BP: ()/(46-88)   SpO2:  [94 %-100 %]      Shift Events (include procedures and significant events)   No acute events throughout shift    AWAKE RASS: Goal - RASS Goal: 0-->alert and calm  Actual - RASS ([place] Agitation-Sedation Scale): alert and calm    Restraint necessity: Not necessary   BREATHE SBT: Not intubated    Coordinate A & B, analgesics/sedatives Pain: managed   SAT: Not intubated   Delirium CAM-ICU: Overall CAM-ICU: Negative   Early(intubated/ Progressive (non-intubated) Mobility MOVE Screen (INTUBATED ONLY): Not intubated    Activity: Activity Management: Arm raise - L1, Rolling - L1   Feeding/Nutrition Diet order: Diet/Nutrition Received: consistent carb/diabetic diet,     Thrombus DVT prophylaxis: VTE Core Measure: Pharmacological prophylaxis initiated/maintained   HOB Elevation Head of Bed (HOB) Positioning: HOB at 30-45 degrees   Ulcer Prophylaxis GI: yes   Glucose control managed Glycemic Management: blood glucose monitored   Skin Skin assessment:     Sacrum intact/not altered? No  Heels intact/not altered? Yes  Surgical wound? No    CHECK ONE!   (no altered skin or altered skin) and sub boxes:  [] No Altered Skin Integrity Present    []Prevention Measures Documented    [] Altered Skin Integrity Present or Discovered   [] LDA present in EPIC, daily doc completed              [] LDA added if not in EPIC (describe wound).                    When describing wound, do not stage, use descriptive words only.    [] Wound Image Taken (required on admit,                   transfer/discharge and every Tuesday)    Wound Care Consulted? Yes   Bowel Function constipation    Indwelling Catheter Necessity      Urethral Catheter 11/11/24 0127 Non-latex 18 Fr.-Reason for Continuing Urinary Catheterization: Critically ill in ICU  and requiring hourly monitoring of intake/output          De-escalation Antibiotics Yes        VS and assessment per flow sheet, patient progressing towards goals as tolerated, plan of care reviewed with family, all concerns addressed, will continue to monitor.

## 2024-11-15 NOTE — SUBJECTIVE & OBJECTIVE
Interval History/Significant Events: NAEON    Review of Systems   Constitutional:  Negative for activity change, chills and fever.   HENT:  Negative for trouble swallowing.    Eyes:  Negative for photophobia and visual disturbance.   Respiratory:  Negative for cough and chest tightness.    Cardiovascular:  Positive for leg swelling. Negative for chest pain and palpitations.   Gastrointestinal:  Negative for abdominal pain, constipation, diarrhea, nausea and vomiting.   Genitourinary:  Positive for frequency. Negative for hematuria.   Musculoskeletal:  Negative for back pain, gait problem and neck pain.   Skin:  Negative for rash and wound.   Neurological:  Negative for dizziness, syncope, speech difficulty and light-headedness.   Psychiatric/Behavioral:  Negative for agitation and confusion. The patient is not nervous/anxious.      Objective:     Vital Signs (Most Recent):  Temp: 98.1 °F (36.7 °C) (11/15/24 1252)  Pulse: 60 (11/15/24 1309)  Resp: 17 (11/15/24 1309)  BP: (!) 120/58 (11/15/24 1252)  SpO2: 100 % (11/15/24 1309) Vital Signs (24h Range):  Temp:  [97.7 °F (36.5 °C)-98.1 °F (36.7 °C)] 98.1 °F (36.7 °C)  Pulse:  [50-60] 60  Resp:  [17-33] 17  SpO2:  [94 %-100 %] 100 %  BP: (107-152)/(48-82) 120/58   Weight: 90.8 kg (200 lb 2.8 oz)  Body mass index is 34.36 kg/m².      Intake/Output Summary (Last 24 hours) at 11/15/2024 1324  Last data filed at 11/15/2024 0928  Gross per 24 hour   Intake 1368.34 ml   Output 975 ml   Net 393.34 ml          Physical Exam  Vitals and nursing note reviewed.   HENT:      Head: Normocephalic.      Right Ear: External ear normal.      Left Ear: External ear normal.   Eyes:      Extraocular Movements: Extraocular movements intact.   Cardiovascular:      Rate and Rhythm: Normal rate.      Pulses: Normal pulses.   Pulmonary:      Effort: Pulmonary effort is normal. No respiratory distress.   Abdominal:      Palpations: Abdomen is soft.   Musculoskeletal:      Cervical back: Normal  "range of motion.      Right lower leg: Edema present.      Left lower leg: Edema present.   Skin:     General: Skin is warm.   Neurological:      Mental Status: She is alert and oriented to person, place, and time. Mental status is at baseline.   Psychiatric:         Behavior: Behavior normal.            Vents:  Oxygen Concentration (%): 24 (11/15/24 0200)  Lines/Drains/Airways       Drain  Duration                  Urethral Catheter 11/11/24 0127 Non-latex 18 Fr. 4 days              Peripheral Intravenous Line  Duration                  Peripheral IV - Single Lumen 11/14/24 0730 22 G Left;Posterior Forearm 1 day         Peripheral IV - Single Lumen 11/14/24 0955 20 G Anterior;Right Shoulder 1 day                  Significant Labs:    CBC/Anemia Profile:  Recent Labs   Lab 11/14/24  0341 11/15/24  0308   WBC 8.17 10.40   HGB 8.7* 8.7*   HCT 27.6* 27.4*    339   MCV 85 84   RDW 17.9* 17.9*        Chemistries:  Recent Labs   Lab 11/14/24  0341 11/14/24  0744 11/14/24  2311 11/15/24  0308 11/15/24  0855 11/15/24  1149   *   < > 129*  129* 129* 130* 131*   K 3.6   < > 4.4  4.4 4.5 4.2  --    CL 85*   < > 84*  84* 85* 86*  --    CO2 37*   < > 36*  36* 34* 34*  --    BUN 18   < > 26*  26* 25* 23  --    CREATININE 0.8   < > 1.1  1.1 0.9 0.8  --    CALCIUM 8.3*   < > 8.3*  8.3* 8.5* 8.7  --    ALBUMIN 2.5*  --  2.4* 2.4*  --   --    PROT 5.9*  --   --  6.0  --   --    BILITOT 0.4  --   --  0.4  --   --    ALKPHOS 39*  --   --  34*  --   --    ALT 12  --   --  13  --   --    AST 19  --   --  25  --   --    MG 1.5*  --   --  1.8  --   --    PHOS 3.3  --  3.6 3.1  --   --     < > = values in this interval not displayed.       Coagulation: No results for input(s): "PT", "INR", "APTT" in the last 48 hours.  Lactic Acid: No results for input(s): "LACTATE" in the last 48 hours.  Troponin: No results for input(s): "TROPONINI" in the last 48 hours.    Significant Imaging:  I have reviewed all pertinent imaging " results/findings within the past 24 hours.

## 2024-11-15 NOTE — ASSESSMENT & PLAN NOTE
- consult received for evaluation of skin injury.  - pt presents to [facility] ED for complaints of worsening shortness of breath, weight gain.  - small partial thickness tissue loss to right sacral/buttocks with surrounding darkened discoloration.   - area appears darker in media vs in person. Unsure if natural discoloration or deep tissue injury related.  - will follow for wound progression.  - continue BPCO bid/prn.  - Elli surface.  - olson catheter in place.  - heel boots and pillows for offloading.  - pt turns well with minimal assistance. Turn q2h.  - troy score documented at 15 with a nutrition score of 3.  - nursing to maintain pressure injury prevention measures and continue wound care per orders.

## 2024-11-15 NOTE — PLAN OF CARE
SHARYN spoke with spouse Anthony [phone] and daughter Tammy [phone]  in regards to dispo planning.  Therapy is recommending moderate at this time. SW spoke with pt/family at the bedside and explain to them what moderate intensity meant and what's nf was.   Family wants more time to decide placement options because spouse likely wants pt to go home.   SW will provide snf list to family to look over.  Will follow up.    Adrianna Archuleta MSW, CSW

## 2024-11-15 NOTE — PT/OT/SLP PROGRESS
Physical Therapy Co-Treatment with OT    Patient Name:  Adriana Strong   MRN:  [id number]    Recommendations:     Discharge Recommendations: Moderate Intensity Therapy  Discharge Equipment Recommendations: none  Barriers to discharge: Decreased caregiver support    Assessment:     Adriana Strong is a 79 y.o. female admitted with a medical diagnosis of Acute on chronic diastolic congestive heart failure.  She presents with the following impairments/functional limitations: weakness, impaired endurance, impaired functional mobility, gait instability, impaired balance, decreased safety awareness, decreased lower extremity function pt tolerated treatment better being able to gait train. Pt will benefit from cont skilled PT 4x/wk to progress physically.  Pt is significantly below previous functional level, increased risk of falls and increased burden of care currently. Patient continues to demonstrate the need for moderate intensity therapy on a daily basis post acute exhibited by decreased independence with functional mobility    Rehab Prognosis: Good; patient would benefit from acute skilled PT services to address these deficits and reach maximum level of function.    Recent Surgery: * No surgery found *      Plan:     During this hospitalization, patient to be seen 4 x/week to address the identified rehab impairments via gait training, therapeutic activities, therapeutic exercises and progress toward the following goals:    Plan of Care Expires:  12/12/24    Subjective     Chief Complaint: pt had no complaints.  Patient/Family Comments/goals: to get better and go home.   Pain/Comfort:  Pain Rating 1: 0/10  Pain Rating Post-Intervention 1: 0/10      Objective:     Communicated with nurse  prior to session.  Patient found supine with telemetry, olson catheter, oxygen (hep lock IV) upon PT entry to room.     General Precautions: Standard, fall  Orthopedic Precautions:    Braces:    Respiratory Status: Room air    "  Functional Mobility:  Bed Mobility: pt needed verbal cues for hand placement and sequencing for log rolling and mobility.     Rolling Right: moderate assistance  Supine to Sit: moderate assistance    Transfers:     Sit to Stand:  minimum assistance with rolling walker    Gait: pt received gait training ~ 6 ft with RW and min assist. Pt had B knee slightly "buckling" during gait training.     Balance: pt sat on EOB with SBA and performed ADLS with OT.     Due to pt complex medical condition, the skill of 2 licensed therapists is needed to maximize treatment session and progression towards goals  Pt white board updated with current therapists name and level of mobility assistance needed.         AM-PAC 6 CLICK MOBILITY  Turning over in bed (including adjusting bedclothes, sheets and blankets)?: 2  Sitting down on and standing up from a chair with arms (e.g., wheelchair, bedside commode, etc.): 3  Moving from lying on back to sitting on the side of the bed?: 2  Moving to and from a bed to a chair (including a wheelchair)?: 3  Need to walk in hospital room?: 3  Climbing 3-5 steps with a railing?: 1  Basic Mobility Total Score: 14       Treatment & Education:  Pt received verbal instructions in PT POC. Pt verbally expressed understanding of such.     Patient left up in chair with all lines intact, call button in reach, and RN  notified..    GOALS:   Multidisciplinary Problems       Physical Therapy Goals          Problem: Physical Therapy    Goal Priority Disciplines Outcome Interventions   Physical Therapy Goal     PT, PT/OT Progressing    Description: Goals to be met by: 24     Patient will increase functional independence with mobility by performin. Supine to sit with Stand-by Assistance  2. Sit to stand transfer with Stand-by Assistance with RW.   3. Bed to chair transfer with Stand-by Assistance using Rolling Walker  4. Gait  x 100 feet with Stand-by Assistance using Rolling Walker.   5. Lower " extremity exercise program x15 reps per handout, with assistance as needed to increase tolerance to activities.                          Time Tracking:     PT Received On: 11/15/24  PT Start Time: 1058     PT Stop Time: 1117  PT Total Time (min): 19 min     Billable Minutes: Gait Training 19 min     Treatment Type: Treatment (with OT)  PT/PTA: PT     Number of PTA visits since last PT visit: 0     11/15/2024

## 2024-11-15 NOTE — PLAN OF CARE
Problem: Physical Therapy  Goal: Physical Therapy Goal  Description: Goals to be met by: 24     Patient will increase functional independence with mobility by performin. Supine to sit with Stand-by Assistance  2. Sit to stand transfer with Stand-by Assistance with RW.   3. Bed to chair transfer with Stand-by Assistance using Rolling Walker  4. Gait  x 100 feet with Stand-by Assistance using Rolling Walker.   5. Lower extremity exercise program x15 reps per handout, with assistance as needed to increase tolerance to activities.     Outcome: Progressing   Goals remain appropriate. 11/15/2024

## 2024-11-15 NOTE — PLAN OF CARE
Pt resting comfortably in bed on left side.  Pt sat up in chair for two hours today. Pt c/o buttocks hurting and was assisted to bed by two persons.  Pt is anxious with transfers, however safety maintained.  Pt continues short of breath with activity on 2lpm oxygen per nasal cannula. No distress at the moment.  Problem: Adult Inpatient Plan of Care  Goal: Plan of Care Review  Outcome: Progressing  Goal: Patient-Specific Goal (Individualized)  Outcome: Progressing  Goal: Absence of Hospital-Acquired Illness or Injury  Outcome: Progressing  Goal: Optimal Comfort and Wellbeing  Outcome: Progressing  Goal: Readiness for Transition of Care  Outcome: Progressing     Problem: Infection  Goal: Absence of Infection Signs and Symptoms  Outcome: Progressing     Problem: Diabetes Comorbidity  Goal: Blood Glucose Level Within Targeted Range  Outcome: Progressing     Problem: Acute Kidney Injury/Impairment  Goal: Fluid and Electrolyte Balance  Outcome: Progressing  Goal: Improved Oral Intake  Outcome: Progressing  Goal: Effective Renal Function  Outcome: Progressing     Problem: Wound  Goal: Optimal Coping  Outcome: Progressing  Goal: Optimal Functional Ability  Outcome: Progressing  Goal: Absence of Infection Signs and Symptoms  Outcome: Progressing  Goal: Improved Oral Intake  Outcome: Progressing  Goal: Optimal Pain Control and Function  Outcome: Progressing  Goal: Skin Health and Integrity  Outcome: Progressing  Goal: Optimal Wound Healing  Outcome: Progressing     Problem: Fall Injury Risk  Goal: Absence of Fall and Fall-Related Injury  Outcome: Progressing     Problem: Skin Injury Risk Increased  Goal: Skin Health and Integrity  Outcome: Progressing

## 2024-11-15 NOTE — PT/OT/SLP PROGRESS
Occupational Therapy   Treatment    Name: Adriana Strong  MRN: [id number]  Admitting Diagnosis:  Acute on chronic diastolic congestive heart failure       Recommendations:     Discharge Recommendations: Moderate Intensity Therapy  Discharge Equipment Recommendations:  none  Barriers to discharge:   Decreased caregiver support    Assessment:     Adriana Strong is a 79 y.o. female with a medical diagnosis of Acute on chronic diastolic congestive heart failure. Performance deficits affecting function are weakness, impaired endurance, impaired self care skills, impaired functional mobility, gait instability, impaired balance, decreased lower extremity function, decreased safety awareness. Patient cleared for therapy and sat EOB participating in ADLs. Patient then ambulated with RW with bedside chair follow up. Patient left sitting in bedside chair at end of session. Nurse notified. Recommending moderate intensity therapy once medically appropriate for discharge to increase maximal independence, reduce burden of care, and ensure safety. Patient would benefit from continued skilled acute OT 4x/wk to improve functional mobility, increase independence with ADLs, and address established goals.    Rehab Prognosis:  Good; patient would benefit from acute skilled OT services to address these deficits and reach maximum level of function.       Plan:     Patient to be seen 4 x/week to address the above listed problems via self-care/home management, therapeutic exercises, therapeutic activities, neuromuscular re-education  Plan of Care Expires: 12/14/24  Plan of Care Reviewed with: patient    Subjective     Chief Complaint: none noted  Patient/Family Comments/goals: patient agreed to therapy  Pain/Comfort:  Pain Rating 1: 0/10  Pain Rating Post-Intervention 1: 0/10    Objective:     Communicated with: NAFISA prior to session.  Patient found supine with telemetry, olson catheter, oxygen, peripheral IV upon OT entry to  room.    General Precautions: Standard, fall    Orthopedic Precautions:N/A  Braces: N/A  Respiratory Status: Nasal cannula     Occupational Performance:     Bed Mobility:    Patient completed Rolling/Turning to Right with moderate assistance  Patient completed Supine to Sit with moderate assistance     Functional Mobility/Transfers:  Patient completed Sit <> Stand Transfer with minimal assistance  with  rolling walker   Patient completed Bed > Chair Transfer using functional ambulation technique with minimum assistance with rolling walker (bedside chair follow up and B LE slightly buckling.     Activities of Daily Living:  Grooming: stand by assistance washing face EOB (patient applied chapstick  EOB also)  Upper Body Dressing:moderate assistance Donning back gown  Lower Body Dressing: total assistance Donning socks EOB (patient attempted to cross legs and perform task EOB, but was unable to perform the task and this therapist needed to assist her).       [facility] 6 Click ADL: 16    Treatment & Education:  Role of OT and POC  ADL retraining  Functional mobility training  Safety  Importance EOB/OOB activity    Co-treatment performed due to patient's multiple deficits requiring two skilled therapists to appropriately and safely assess patient's strength and endurance while facilitating functional tasks in addition to accommodating for patient's activity tolerance.     Patient left up in chair with all lines intact, call button in reach, nurse notified, and all needs met.     GOALS:   Multidisciplinary Problems       Occupational Therapy Goals          Problem: Occupational Therapy    Goal Priority Disciplines Outcome Interventions   Occupational Therapy Goal     OT, PT/OT Progressing    Description: Goals to be met by: 12/14/24 (1 mo)     Patient will increase functional independence with ADLs by performing:    UE Dressing with Runnels.  LE Dressing with Runnels.  Grooming while standing at sink with  McCone.  Toileting from toilet with McCone for hygiene and clothing management.   Rolling to Bilateral with McCone.   Supine to sit with McCone.  Step transfer with McCone  Toilet transfer to toilet with McCone.                         Time Tracking:     OT Date of Treatment: 11/15/24  OT Start Time: 1058  OT Stop Time: 1117  OT Total Time (min): 19 min    Billable Minutes:Self Care/Home Management 19               11/15/2024

## 2024-11-16 DIAGNOSIS — I10 ESSENTIAL HYPERTENSION: ICD-10-CM

## 2024-11-16 LAB
ALBUMIN SERPL BCP-MCNC: 2.4 G/DL (ref 3.5–5.2)
ALP SERPL-CCNC: 36 U/L (ref 40–150)
ALT SERPL W/O P-5'-P-CCNC: 11 U/L (ref 10–44)
ANION GAP SERPL CALC-SCNC: 10 MMOL/L (ref 8–16)
ANION GAP SERPL CALC-SCNC: 13 MMOL/L (ref 8–16)
AST SERPL-CCNC: 19 U/L (ref 10–40)
BASOPHILS # BLD AUTO: 0.06 K/UL (ref 0–0.2)
BASOPHILS NFR BLD: 0.5 % (ref 0–1.9)
BILIRUB SERPL-MCNC: 0.5 MG/DL (ref 0.1–1)
BUN SERPL-MCNC: 28 MG/DL (ref 8–23)
BUN SERPL-MCNC: 32 MG/DL (ref 8–23)
CALCIUM SERPL-MCNC: 8.7 MG/DL (ref 8.7–10.5)
CALCIUM SERPL-MCNC: 8.8 MG/DL (ref 8.7–10.5)
CHLORIDE SERPL-SCNC: 87 MMOL/L (ref 95–110)
CHLORIDE SERPL-SCNC: 87 MMOL/L (ref 95–110)
CO2 SERPL-SCNC: 33 MMOL/L (ref 23–29)
CO2 SERPL-SCNC: 37 MMOL/L (ref 23–29)
CREAT SERPL-MCNC: 0.8 MG/DL (ref 0.5–1.4)
CREAT SERPL-MCNC: 1 MG/DL (ref 0.5–1.4)
DIFFERENTIAL METHOD BLD: ABNORMAL
EOSINOPHIL # BLD AUTO: 0.1 K/UL (ref 0–0.5)
EOSINOPHIL NFR BLD: 0.5 % (ref 0–8)
ERYTHROCYTE [DISTWIDTH] IN BLOOD BY AUTOMATED COUNT: 17.4 % (ref 11.5–14.5)
EST. GFR  (NO RACE VARIABLE): 56.6 ML/MIN/1.73 M^2
EST. GFR  (NO RACE VARIABLE): >60 ML/MIN/1.73 M^2
GLUCOSE SERPL-MCNC: 152 MG/DL (ref 70–110)
GLUCOSE SERPL-MCNC: 84 MG/DL (ref 70–110)
HCT VFR BLD AUTO: 27.7 % (ref 37–48.5)
HGB BLD-MCNC: 9.2 G/DL (ref 12–16)
IMM GRANULOCYTES # BLD AUTO: 0.07 K/UL (ref 0–0.04)
IMM GRANULOCYTES NFR BLD AUTO: 0.6 % (ref 0–0.5)
LYMPHOCYTES # BLD AUTO: 1.5 K/UL (ref 1–4.8)
LYMPHOCYTES NFR BLD: 12.8 % (ref 18–48)
MAGNESIUM SERPL-MCNC: 1.7 MG/DL (ref 1.6–2.6)
MCH RBC QN AUTO: 27.2 PG (ref 27–31)
MCHC RBC AUTO-ENTMCNC: 33.2 G/DL (ref 32–36)
MCV RBC AUTO: 82 FL (ref 82–98)
MONOCYTES # BLD AUTO: 1.6 K/UL (ref 0.3–1)
MONOCYTES NFR BLD: 14 % (ref 4–15)
NEUTROPHILS # BLD AUTO: 8.4 K/UL (ref 1.8–7.7)
NEUTROPHILS NFR BLD: 71.6 % (ref 38–73)
NRBC BLD-RTO: 0 /100 WBC
PHOSPHATE SERPL-MCNC: 2.7 MG/DL (ref 2.7–4.5)
PLATELET # BLD AUTO: 348 K/UL (ref 150–450)
PMV BLD AUTO: 9.7 FL (ref 9.2–12.9)
POCT GLUCOSE: 105 MG/DL (ref 70–110)
POCT GLUCOSE: 139 MG/DL (ref 70–110)
POCT GLUCOSE: 153 MG/DL (ref 70–110)
POTASSIUM SERPL-SCNC: 4.1 MMOL/L (ref 3.5–5.1)
POTASSIUM SERPL-SCNC: 4.5 MMOL/L (ref 3.5–5.1)
PROT SERPL-MCNC: 6.2 G/DL (ref 6–8.4)
RBC # BLD AUTO: 3.38 M/UL (ref 4–5.4)
SODIUM SERPL-SCNC: 129 MMOL/L (ref 136–145)
SODIUM SERPL-SCNC: 133 MMOL/L (ref 136–145)
SODIUM SERPL-SCNC: 134 MMOL/L (ref 136–145)
SODIUM SERPL-SCNC: 134 MMOL/L (ref 136–145)
WBC # BLD AUTO: 11.68 K/UL (ref 3.9–12.7)

## 2024-11-16 PROCEDURE — 25000003 PHARM REV CODE 250: Performed by: INTERNAL MEDICINE

## 2024-11-16 PROCEDURE — 94660 CPAP INITIATION&MGMT: CPT

## 2024-11-16 PROCEDURE — 83735 ASSAY OF MAGNESIUM: CPT

## 2024-11-16 PROCEDURE — 63600175 PHARM REV CODE 636 W HCPCS

## 2024-11-16 PROCEDURE — 94761 N-INVAS EAR/PLS OXIMETRY MLT: CPT

## 2024-11-16 PROCEDURE — 36415 COLL VENOUS BLD VENIPUNCTURE: CPT

## 2024-11-16 PROCEDURE — 85025 COMPLETE CBC W/AUTO DIFF WBC: CPT

## 2024-11-16 PROCEDURE — 25000003 PHARM REV CODE 250

## 2024-11-16 PROCEDURE — 27100171 HC OXYGEN HIGH FLOW UP TO 24 HOURS

## 2024-11-16 PROCEDURE — 80048 BASIC METABOLIC PNL TOTAL CA: CPT | Mod: XB | Performed by: STUDENT IN AN ORGANIZED HEALTH CARE EDUCATION/TRAINING PROGRAM

## 2024-11-16 PROCEDURE — 25000003 PHARM REV CODE 250: Performed by: STUDENT IN AN ORGANIZED HEALTH CARE EDUCATION/TRAINING PROGRAM

## 2024-11-16 PROCEDURE — 36415 COLL VENOUS BLD VENIPUNCTURE: CPT | Mod: XB | Performed by: STUDENT IN AN ORGANIZED HEALTH CARE EDUCATION/TRAINING PROGRAM

## 2024-11-16 PROCEDURE — 80053 COMPREHEN METABOLIC PANEL: CPT

## 2024-11-16 PROCEDURE — 63600175 PHARM REV CODE 636 W HCPCS: Performed by: INTERNAL MEDICINE

## 2024-11-16 PROCEDURE — 21400001 HC TELEMETRY ROOM

## 2024-11-16 PROCEDURE — 84100 ASSAY OF PHOSPHORUS: CPT

## 2024-11-16 PROCEDURE — 94799 UNLISTED PULMONARY SVC/PX: CPT

## 2024-11-16 PROCEDURE — 99900035 HC TECH TIME PER 15 MIN (STAT)

## 2024-11-16 PROCEDURE — 84295 ASSAY OF SERUM SODIUM: CPT

## 2024-11-16 RX ADMIN — CLOPIDOGREL BISULFATE 75 MG: 75 TABLET ORAL at 09:11

## 2024-11-16 RX ADMIN — ISOSORBIDE MONONITRATE 60 MG: 60 TABLET, EXTENDED RELEASE ORAL at 09:11

## 2024-11-16 RX ADMIN — RANOLAZINE 1000 MG: 500 TABLET, EXTENDED RELEASE ORAL at 08:11

## 2024-11-16 RX ADMIN — SENNOSIDES 8.6 MG: 8.6 TABLET, FILM COATED ORAL at 09:11

## 2024-11-16 RX ADMIN — PIPERACILLIN SODIUM AND TAZOBACTAM SODIUM 4.5 G: 4; .5 INJECTION, POWDER, FOR SOLUTION INTRAVENOUS at 05:11

## 2024-11-16 RX ADMIN — ONDANSETRON 8 MG: 8 TABLET, ORALLY DISINTEGRATING ORAL at 08:11

## 2024-11-16 RX ADMIN — POLYETHYLENE GLYCOL 3350 17 G: 17 POWDER, FOR SOLUTION ORAL at 08:11

## 2024-11-16 RX ADMIN — OXYCODONE HYDROCHLORIDE AND ACETAMINOPHEN 1000 MG: 500 TABLET ORAL at 09:11

## 2024-11-16 RX ADMIN — Medication: at 08:11

## 2024-11-16 RX ADMIN — FUROSEMIDE 80 MG: 10 INJECTION, SOLUTION INTRAVENOUS at 06:11

## 2024-11-16 RX ADMIN — RANOLAZINE 1000 MG: 500 TABLET, EXTENDED RELEASE ORAL at 10:11

## 2024-11-16 RX ADMIN — SENNOSIDES 8.6 MG: 8.6 TABLET, FILM COATED ORAL at 08:11

## 2024-11-16 RX ADMIN — SPIRONOLACTONE 25 MG: 25 TABLET ORAL at 09:11

## 2024-11-16 RX ADMIN — LOSARTAN POTASSIUM 50 MG: 50 TABLET, FILM COATED ORAL at 09:11

## 2024-11-16 RX ADMIN — DULOXETINE HYDROCHLORIDE 20 MG: 20 CAPSULE, DELAYED RELEASE ORAL at 10:11

## 2024-11-16 RX ADMIN — APIXABAN 5 MG: 5 TABLET, FILM COATED ORAL at 08:11

## 2024-11-16 RX ADMIN — ACETAMINOPHEN 650 MG: 325 TABLET ORAL at 09:11

## 2024-11-16 RX ADMIN — PANTOPRAZOLE SODIUM 40 MG: 40 TABLET, DELAYED RELEASE ORAL at 09:11

## 2024-11-16 RX ADMIN — MONTELUKAST 10 MG: 10 TABLET, FILM COATED ORAL at 09:11

## 2024-11-16 RX ADMIN — CARVEDILOL 12.5 MG: 12.5 TABLET, FILM COATED ORAL at 09:11

## 2024-11-16 RX ADMIN — FUROSEMIDE 80 MG: 10 INJECTION, SOLUTION INTRAVENOUS at 09:11

## 2024-11-16 RX ADMIN — APIXABAN 5 MG: 5 TABLET, FILM COATED ORAL at 09:11

## 2024-11-16 RX ADMIN — ATORVASTATIN CALCIUM 10 MG: 10 TABLET, FILM COATED ORAL at 09:11

## 2024-11-16 RX ADMIN — GABAPENTIN 300 MG: 300 CAPSULE ORAL at 08:11

## 2024-11-16 RX ADMIN — Medication: at 09:11

## 2024-11-16 NOTE — ASSESSMENT & PLAN NOTE
Hyponatremia is likely due to Heart Failure. The patient's most recent sodium results are listed below.  Recent Labs     11/15/24  2011 11/15/24  2359 11/16/24  0444   * 129* 133*       Plan  - Correct the sodium by 4-6mEq in 24 hours.   - Obtain the following studies: Urine sodium, urine osmolality, serum osmolality or TSH, T4.  - Will treat the hyponatremia with diuresis  - Monitor sodium Daily.   - Patient hyponatremia is stable

## 2024-11-16 NOTE — ASSESSMENT & PLAN NOTE
- patient with reports of dysuria and dark urine noted at bedside  - UA infectious appearing, but with 23 squams  - urine culture with E Fecalis, treated for 5 day course with Zosyn

## 2024-11-16 NOTE — SUBJECTIVE & OBJECTIVE
Interval History: Voiding trial today, made 1.2L urine output with decreased JVD today.    Review of Systems   Constitutional:  Negative for activity change, appetite change and fever.   Respiratory:  Positive for shortness of breath. Negative for cough and wheezing.    Cardiovascular:  Negative for chest pain and leg swelling.   Gastrointestinal:  Positive for nausea. Negative for abdominal pain, constipation, diarrhea and vomiting.   Skin:  Negative for rash.   Neurological:  Negative for headaches.     Objective:     Vital Signs (Most Recent):  Temp: 98.1 °F (36.7 °C) (11/16/24 1531)  Pulse: 62 (11/16/24 1531)  Resp: 18 (11/16/24 1531)  BP: (!) 95/46 (11/16/24 1531)  SpO2: 98 % (11/16/24 1531) Vital Signs (24h Range):  Temp:  [97.8 °F (36.6 °C)-98.7 °F (37.1 °C)] 98.1 °F (36.7 °C)  Pulse:  [59-65] 62  Resp:  [17-20] 18  SpO2:  [95 %-100 %] 98 %  BP: ()/(46-66) 95/46     Weight: 91.2 kg (201 lb 1 oz)  Body mass index is 34.51 kg/m².    Intake/Output Summary (Last 24 hours) at 11/16/2024 1536  Last data filed at 11/16/2024 1312  Gross per 24 hour   Intake --   Output 1650 ml   Net -1650 ml         Physical Exam  Vitals and nursing note reviewed.   HENT:      Head: Normocephalic and atraumatic.   Eyes:      Extraocular Movements: Extraocular movements intact.      Conjunctiva/sclera: Conjunctivae normal.   Neck:      Comments: JVD to angle of mandible  Cardiovascular:      Rate and Rhythm: Normal rate and regular rhythm.      Pulses: Normal pulses.   Pulmonary:      Effort: Pulmonary effort is normal. No respiratory distress.      Breath sounds: No wheezing or rales.   Abdominal:      Palpations: Abdomen is soft.      Tenderness: There is no abdominal tenderness. There is no guarding.   Musculoskeletal:      Right lower leg: No edema.      Left lower leg: No edema.   Skin:     General: Skin is warm and dry.   Neurological:      General: No focal deficit present.      Mental Status: She is alert and oriented to  person, place, and time.   Psychiatric:         Mood and Affect: Mood normal.             Significant Labs: All pertinent labs within the past 24 hours have been reviewed.    Significant Imaging: I have reviewed all pertinent imaging results/findings within the past 24 hours.

## 2024-11-16 NOTE — ASSESSMENT & PLAN NOTE
Anemia is likely due to Iron deficiency. Most recent hemoglobin and hematocrit are listed below.  Recent Labs     11/14/24  0341 11/15/24  0308 11/16/24  0444   HGB 8.7* 8.7* 9.2*   HCT 27.6* 27.4* 27.7*       Plan  - Monitor serial CBC: Daily  - Transfuse PRBC if patient becomes hemodynamically unstable, symptomatic or H/H drops below 7/21.  - Patient has not received any PRBC transfusions to date  - Patient's anemia is currently stable

## 2024-11-16 NOTE — PLAN OF CARE
Pt slept well this shift. A&Ox 4. VSS. On 2L NC/BIPAP HS and daytime naps. Still with SOB with exertion. Paced rhythm. ACHS accuchecks. Monitoring NA. Daughter at bedside. Denies pain. T&RQ2 as tolerated. Sims CDI. Sacral wound, cream applied, mepilex in place. Safety precautions in place. Call light and personal items in reach. No further concerns noted at this time. No acute distress noted this shift. POC ongoing.     Problem: Adult Inpatient Plan of Care  Goal: Plan of Care Review  Outcome: Progressing  Goal: Absence of Hospital-Acquired Illness or Injury  Outcome: Progressing  Goal: Optimal Comfort and Wellbeing  Outcome: Progressing  Goal: Readiness for Transition of Care  Outcome: Progressing     Problem: Diabetes Comorbidity  Goal: Blood Glucose Level Within Targeted Range  Outcome: Progressing     Problem: Wound  Goal: Optimal Wound Healing  Outcome: Progressing     Problem: Fall Injury Risk  Goal: Absence of Fall and Fall-Related Injury  Outcome: Progressing

## 2024-11-16 NOTE — ASSESSMENT & PLAN NOTE
Patients blood pressure range in the last 24 hours was: BP  Min: 95/46  Max: 221/86.The patient's inpatient anti-hypertensive regimen is listed below:  Current Antihypertensives  isosorbide mononitrate 24 hr tablet 60 mg, Daily, Oral  ranolazine 12 hr tablet 1,000 mg, 2 times daily, Oral  spironolactone tablet 25 mg, Daily, Oral  furosemide injection 80 mg, 2 times daily, Intravenous  hydrALAZINE injection 10 mg, Every 6 hours PRN, Intravenous  carvediloL tablet 12.5 mg, 2 times daily with meals, Oral  losartan tablet 50 mg, Daily, Oral    Plan  - BP is uncontrolled, will adjust as follows: can consider increasing carvedilol if HR can tolerate   - states was previously on losartan, but recently discontinued. Consider resuming if clinically necessary.

## 2024-11-16 NOTE — PROGRESS NOTES
[facility] ([place] [id number])  [facility] Medicine  Progress Note    Patient Name: Adriana Strong  MRN: [id number]  Patient Class: IP- Inpatient   Admission Date: 11/10/2024  Length of Stay: 6 days  Attending Physician: Bradley Rivera MD  Primary Care Provider: Krzysztof Mcgraw MD        Subjective:     Principal Problem:Acute on chronic diastolic congestive heart failure        HPI:  Adriana Strong is a 79 y.o.F with hx of diastolic HF, Afib on elqiuis, s/p pacemaker, HLD, HTN, non obstructive CAD, s/p TAVR, asthma, COPD, on home oxygen (2L via NC) who presents to [facility] ED for complaints of worsening shortness of breath, weight gain. Patient reports SOB at rest and with exertion. States she saw cardiology last week where labs were performed. She was notified that her sodium was low and MD requested that patient hold her lasix x3d, continue spironolactone and after the 3d start lasix 40mg daily and additional prn. Otherwise, patient reports compliance with medications, fluid restriction, Na restriction. Also endorsing dysuria for the last couple of days as well as decreased oral intake. Denies fever, chills, chest pain, palpitations, abdominal pain, n/v/d, headaches, or any other symptoms at this time.     In ED: AF, hypertensive, tachypneic on 2-4L via NC. CBC with chronic, stable anemia. Na 128, K 5.6, otherwise CMP unremarkable. Mag 1.5. . Trop negative. TSH 2.559. POC lactate 1.96. UA with 3+ protein, 1+ leukocyte esterase, 17 WBCs, many bacteria, 23 squams. Culture pending. POC US in ED with trace pericardial effusion. CXR with left pleural effusion and congestive change/edema, developing left lower lung zone consolidation not excluded in this hypoventilatory exam. S/p lasix 80mg inj x2 in ED. Admitted to  for further evaluation    Overview/Hospital Course:  S/p MICU admission for rescue BiPAP, diuresis, and UTI/CAP treatment weaned back to 2L prior to step down. Physical exam without obvious  hypervolemia, but pt demonstrated JVD and Echo 11/14 with CVP 15. Continued to use BiPAP QHS and with long naps. Does not have a machine at home, but could benefit from OP Sleep Medicine evaluation. PT/OT evaluation recs for moderate-intensity therapy.     Interval History: Voiding trial today, made 1.2L urine output with decreased JVD today.    Review of Systems   Constitutional:  Negative for activity change, appetite change and fever.   Respiratory:  Positive for shortness of breath. Negative for cough and wheezing.    Cardiovascular:  Negative for chest pain and leg swelling.   Gastrointestinal:  Positive for nausea. Negative for abdominal pain, constipation, diarrhea and vomiting.   Skin:  Negative for rash.   Neurological:  Negative for headaches.     Objective:     Vital Signs (Most Recent):  Temp: 98.1 °F (36.7 °C) (11/16/24 1531)  Pulse: 62 (11/16/24 1531)  Resp: 18 (11/16/24 1531)  BP: (!) 95/46 (11/16/24 1531)  SpO2: 98 % (11/16/24 1531) Vital Signs (24h Range):  Temp:  [97.8 °F (36.6 °C)-98.7 °F (37.1 °C)] 98.1 °F (36.7 °C)  Pulse:  [59-65] 62  Resp:  [17-20] 18  SpO2:  [95 %-100 %] 98 %  BP: ()/(46-66) 95/46     Weight: 91.2 kg (201 lb 1 oz)  Body mass index is 34.51 kg/m².    Intake/Output Summary (Last 24 hours) at 11/16/2024 1536  Last data filed at 11/16/2024 1312  Gross per 24 hour   Intake --   Output 1650 ml   Net -1650 ml         Physical Exam  Vitals and nursing note reviewed.   HENT:      Head: Normocephalic and atraumatic.   Eyes:      Extraocular Movements: Extraocular movements intact.      Conjunctiva/sclera: Conjunctivae normal.   Neck:      Comments: JVD to angle of mandible  Cardiovascular:      Rate and Rhythm: Normal rate and regular rhythm.      Pulses: Normal pulses.   Pulmonary:      Effort: Pulmonary effort is normal. No respiratory distress.      Breath sounds: No wheezing or rales.   Abdominal:      Palpations: Abdomen is soft.      Tenderness: There is no abdominal  "tenderness. There is no guarding.   Musculoskeletal:      Right lower leg: No edema.      Left lower leg: No edema.   Skin:     General: Skin is warm and dry.   Neurological:      General: No focal deficit present.      Mental Status: She is alert and oriented to person, place, and time.   Psychiatric:         Mood and Affect: Mood normal.             Significant Labs: All pertinent labs within the past 24 hours have been reviewed.    Significant Imaging: I have reviewed all pertinent imaging results/findings within the past 24 hours.    Assessment/Plan:      * Acute on chronic diastolic congestive heart failure  Patient has Diastolic (HFpEF) heart failure that is Acute on chronic. On presentation their CHF was decompensated. Evidence of decompensated CHF on presentation includes: edema, elevated JVD, crackles on lung auscultation, weight gain, orthopnea, dyspnea on exertion (WHITLEY), and shortness of breath. The etiology of their decompensation is likely medication adjustments . Patient is on the HF pathway. Most recent BNP and echo results are listed below.  No results for input(s): "BNP" in the last 72 hours.    Latest ECHO  Results for orders placed during the hospital encounter of 09/24/24    Echo    Interpretation Summary    Left Ventricle: The left ventricle is normal in size. Normal wall thickness. There is concentric remodeling. There is normal systolic function with a visually estimated ejection fraction of 60 - 65%. Grade III diastolic dysfunction. Elevated left ventricular filling pressure.    Right Ventricle: Normal right ventricular cavity size. Wall thickness is normal. Systolic function is normal. Pacemaker lead present in the ventricle.    The left atrium is severely dilated.    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 It is reported to be a 26mm Phan Suzi S3 valve. Aortic valve area by VTI is 1.90 cm2. Aortic valve peak velocity is 1.64 m/s. Mean gradient " is 6 mmHg. The dimensionless index is 0.57. There is mild aortic regurgitation.    Tricuspid Valve: There is mild regurgitation.    Pulmonary Artery: The estimated pulmonary artery systolic pressure is 48 mmHg.    IVC/SVC: Elevated venous pressure at 15 mmHg.    Pericardium: There is a small posterior effusion. No indication of cardiac tamponade.    Current Heart Failure Medications  spironolactone tablet 25 mg, Daily, Oral  furosemide injection 80 mg, 2 times daily, Intravenous  hydrALAZINE injection 10 mg, Every 6 hours PRN, Intravenous  carvediloL tablet 12.5 mg, 2 times daily with meals, Oral  losartan tablet 50 mg, Daily, Oral    Plan  - Place on telemetry  - Place on fluid restriction of 1.5 L.   - Cardiology has not been consulted  - The patient's volume status is stable but not at their baseline as indicated by edema, elevated JVD, crackles on lung auscultation, weight gain, dyspnea on exertion (WHITLEY), and shortness of breath  - will continue to diurese with 80mg IV lasix BID given JVD elevation. Pt at baseline 2L NC.   - strict intake and output, daily standing weights    Intake/Output Summary (Last 24 hours) at 11/16/2024 1544  Last data filed at 11/16/2024 1312  Gross per 24 hour   Intake --   Output 1650 ml   Net -1650 ml             Irritant contact dermatitis due to friction or contact with body fluids, unspecified  Sacral wound with appreciated recs by Wound Care      Anxiety  - home Lorazepam      Pleural effusion  Patient found to have small pleural effusion on imaging. I have personally reviewed and interpreted the following imaging: CT. A thoracentesis was performed. Pleural fluid was sent for analysis. Labs reviewed, including  . Fluid most consistent with Transudate.  . Most likely etiology includes Congestive Heart Failure. Management to include Diuresis      Hypomagnesemia  Patient has Abnormal Magnesium: hypomagnesemia. Will continue to monitor electrolytes closely. Will replace the affected  electrolytes and repeat labs to be done after interventions completed. The patient's magnesium results have been reviewed and are listed below.  Recent Labs   Lab 11/10/24  1200   MG 1.5*        Hyperkalemia  The patients most recent potassium results are listed below.  Recent Labs     11/08/24  1015 11/10/24  1200   K 4.7 5.6*     Plan  - Monitor for arrhythmias with EKG and/or continuous telemetry.   - Treat the hyperkalemia with Potassium Binders.   - Monitor potassium: Daily  - The patient's hyperkalemia is stable    Anemia  Anemia is likely due to Iron deficiency. Most recent hemoglobin and hematocrit are listed below.  Recent Labs     11/14/24  0341 11/15/24  0308 11/16/24  0444   HGB 8.7* 8.7* 9.2*   HCT 27.6* 27.4* 27.7*       Plan  - Monitor serial CBC: Daily  - Transfuse PRBC if patient becomes hemodynamically unstable, symptomatic or H/H drops below 7/21.  - Patient has not received any PRBC transfusions to date  - Patient's anemia is currently stable    Acute on chronic respiratory failure with hypoxia  Patient with Hypoxic Respiratory failure which is Acute on chronic.  she is on home oxygen at 2 LPM. Supplemental oxygen was provided and noted- Oxygen Concentration (%):  [30] 30    .   Signs/symptoms of respiratory failure include- tachypnea and increased work of breathing. Contributing diagnoses includes - CHF Labs and images were reviewed. Patient Has not had a recent ABG. Will treat underlying causes and adjust management of respiratory failure as follows- diuresis    CAD (coronary artery disease)  - history noted   - continue statin, plavix    Acute cystitis without hematuria  - patient with reports of dysuria and dark urine noted at bedside  - UA infectious appearing, but with 23 squams  - urine culture with E Fecalis, treated for 5 day course with Zosyn      Hyponatremia  Hyponatremia is likely due to Heart Failure. The patient's most recent sodium results are listed below.  Recent Labs      11/15/24  2011 11/15/24  2359 11/16/24  0444   * 129* 133*       Plan  - Correct the sodium by 4-6mEq in 24 hours.   - Obtain the following studies: Urine sodium, urine osmolality, serum osmolality or TSH, T4.  - Will treat the hyponatremia with diuresis  - Monitor sodium Daily.   - Patient hyponatremia is stable    COPD (chronic obstructive pulmonary disease)  Patient's COPD is controlled currently.  Patient is currently off COPD Pathway. Continue scheduled inhalers Supplemental oxygen and monitor respiratory status closely.   - prn duonebs    Aortic stenosis  Echocardiogram with evidence of aortic stenosis.The patient's most recent echocardiogram result is listed below. We will manage the valvular abnormality by monitoring.     Echo    Result Date: 11/14/2024    Left Ventricle: The left ventricle is normal in size. Normal wall   thickness. There is normal systolic function with a visually estimated   ejection fraction of 60 - 65%. Ejection fraction is approximately 65%.    Right Ventricle: Normal right ventricular cavity size. Wall thickness   is normal. Systolic function is normal.    Left Atrium: Left atrium is moderately dilated.    Right Atrium: Right atrium is moderately dilated.    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 It is reported to be a 26 mm Phan Suzi valve. Mildly   calcified cusps. Aortic valve area by VTI is 0.8 cm2. Aortic valve peak   velocity is 2.9 m/s. Mean gradient is 21.8 mmHg. The dimensionless index   is 0.25.  msand AcT/ET ratio is 0.36. Findings and trend from prior   echocardiography evaluation is suggestive of prosthetic valve stenosis.   Clinical correlation is required.    Mitral Valve: There is moderate mitral annular calcification present.    Tricuspid Valve: PISA radius is 0.6cm and Vena Contracta is 0.7cm   consistent with moderate to severe regurgitation.    Pulmonary Artery: There is moderate to severe pulmonary  hypertension.   The estimated pulmonary artery systolic pressure is 62 mmHg.    IVC/SVC: Elevated venous pressure at 15 mmHg.    Pericardium: There is a trivial circumferential effusion.        Echo    Result Date: 11/11/2024    Left Ventricle: The left ventricle is normal in size. Normal wall   thickness. There is normal systolic function. Ejection fraction is   approximately 65%. There is normal diastolic function.    Right Ventricle: Normal right ventricular cavity size. Wall thickness   is normal. Systolic function is normal. Pacemaker lead present in the   ventricle.    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 It is reported to be a 26 mm Phan valve. The leaflets appear   mildly thickened. Aortic valve area by VTI is 0.7 cm2. LVOT diameter is   1.9 cm. Aortic valve peak velocity is 3.3 m/s. Mean gradient is 33.3 mmHg.   The dimensionless index is 0.26. Mild paravalvular regurgitation.    Mitral Valve: There is moderate mitral annular calcification present.    Tricuspid Valve: There is moderate regurgitation.    Pulmonary Artery: There is pulmonary hypertension. The estimated   pulmonary artery systolic pressure is 62 mmHg.    IVC/SVC: Intermediate venous pressure at 8 mmHg.        Echo    Result Date: 9/25/2024    Left Ventricle: The left ventricle is normal in size. Normal wall   thickness. There is concentric remodeling. There is normal systolic   function with a visually estimated ejection fraction of 60 - 65%. Grade   III diastolic dysfunction. Elevated left ventricular filling pressure.    Right Ventricle: Normal right ventricular cavity size. Wall thickness   is normal. Systolic function is normal. Pacemaker lead present in the   ventricle.    The left atrium is severely dilated.    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 It is reported to be a 26mm Phan Suzi S3 valve. Aortic   valve area by VTI is 1.90 cm2.  Aortic valve peak velocity is 1.64 m/s.   Mean gradient is 6 mmHg. The dimensionless index is 0.57. There is mild   aortic regurgitation.    Tricuspid Valve: There is mild regurgitation.    Pulmonary Artery: The estimated pulmonary artery systolic pressure is   48 mmHg.    IVC/SVC: Elevated venous pressure at 15 mmHg.    Pericardium: There is a small posterior effusion. No indication of   cardiac tamponade.        Echo    Result Date: 2/10/2024    Left Ventricle: The left ventricle is normal in size. Normal wall   thickness. There is concentric remodeling. Regional wall motion   abnormalities present. There is moderately reduced systolic function with   a visually estimated ejection fraction of 35 - 40%. Ejection fraction by   visual approximation is 35%. There is normal diastolic function.    Right Ventricle: Normal right ventricular cavity size. Wall thickness   is normal. Right ventricle wall motion  is normal. Systolic function is   normal. Pacemaker lead present in the ventricle.    Left Atrium: Left atrium is moderately dilated.    Right Atrium: Right atrium is mildly dilated. Lead present in the right   atrium.    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 Aortic valve area by VTI is 1.03 cm². Aortic valve peak velocity   is 2.07 m/s. Mean gradient is 11 mmHg. The dimensionless index is 0.30.   There is trace aortic regurgitation.    Mitral Valve: There is mild mitral annular calcification present.    Tricuspid Valve: There is mild regurgitation.    Pulmonary Artery: There is mild to moderate pulmonary hypertension. The   estimated pulmonary artery systolic pressure is 47 mmHg.    IVC/SVC: Normal venous pressure at 3 mmHg.        - Continue judicious diuresis given forward-flow dependence for aortic valve stenosis  - Will need OP follow up after hospitalization to discuss post-TAVR aortic valve stenosis    Permanent atrial fibrillation  Patient has permanent atrial  fibrillation. Patient is currently in sinus rhythm. [id number] Score: 5. The patients heart rate in the last 24 hours is as follows:  Pulse  Min: 59  Max: 65     Antiarrhythmics       Anticoagulants  apixaban tablet 5 mg, 2 times daily, Oral    Plan  - Replete lytes with a goal of K>4, Mg >2  - Patient is anticoagulated, see medications listed above.  - Patient's afib is currently controlled        Essential hypertension  Patients blood pressure range in the last 24 hours was: BP  Min: 95/46  Max: 221/86.The patient's inpatient anti-hypertensive regimen is listed below:  Current Antihypertensives  isosorbide mononitrate 24 hr tablet 60 mg, Daily, Oral  ranolazine 12 hr tablet 1,000 mg, 2 times daily, Oral  spironolactone tablet 25 mg, Daily, Oral  furosemide injection 80 mg, 2 times daily, Intravenous  hydrALAZINE injection 10 mg, Every 6 hours PRN, Intravenous  carvediloL tablet 12.5 mg, 2 times daily with meals, Oral  losartan tablet 50 mg, Daily, Oral    Plan  - BP is uncontrolled, will adjust as follows: can consider increasing carvedilol if HR can tolerate   - states was previously on losartan, but recently discontinued. Consider resuming if clinically necessary.    Gastroesophageal reflux disease  - continue home PPI      HLD (hyperlipidemia)  - continue home statin      Type 2 diabetes mellitus  Patient's FSGs are controlled on current medication regimen.  Last A1c reviewed-   Lab Results   Component Value Date    HGBA1C 5.2 11/10/2024     Most recent fingerstick glucose reviewed-   Recent Labs   Lab 11/15/24  1616 11/15/24  1950 11/16/24  0748   POCTGLUCOSE 150* 160* 105     Current correctional scale  Low  Maintain anti-hyperglycemic dose as follows-   Antihyperglycemics (From admission, onward)      Start     Stop Route Frequency Ordered    11/10/24 1513  insulin aspart U-100 pen 0-5 Units         -- SubQ Before meals & nightly PRN 11/10/24 1414          Hold Oral hypoglycemics while patient is in the  hospital.      VTE Risk Mitigation (From admission, onward)           Ordered     apixaban tablet 5 mg  2 times daily         11/11/24 1535     IP VTE HIGH RISK PATIENT  Once         11/10/24 1414     Place sequential compression device  Until discontinued         11/10/24 1414     Reason for No Pharmacological VTE Prophylaxis  Once        Question:  Reasons:  Answer:  Already adequately anticoagulated on oral Anticoagulants    11/10/24 1414     Place sequential compression device  Until discontinued         11/10/24 1414     Place sequential compression device  Until discontinued         11/10/24 1414                    Discharge Planning   MIMI: 11/17/2024     Code Status: Full Code   Is the patient medically ready for discharge?:     Reason for patient still in hospital (select all that apply): Treatment and Pending disposition  Discharge Plan A: Home Health   Discharge Delays: None known at this time              Bradley Rivera MD  Department of Hospital Medicine   [facility] - [facility] ([place])

## 2024-11-16 NOTE — CARE UPDATE
After eating a few bites of dinner pt experienced shortness of breath and stated her chest felt heavy. /61 (right forearm) Resp 18, Pulse 60, O2 sats 98%.  Pt pulled up in bed and HOB up to 45 degrees to assist with breathing.  RN sat with pt until she calmed and place pt on her BIPAP.  Pt resting at the moment.

## 2024-11-16 NOTE — PLAN OF CARE
SW spoke with pt/family at the bedside and explain to them what moderate intensity meant and what snf was.   Family wants more time to look over placement options because spouse likely wants pt to go home.     [facility] ([place])  Discharge Reassessment    Primary Care Provider: Krzysztof Mcgraw MD    Expected Discharge Date: 11/17/2024    Reassessment (most recent)       Discharge Reassessment - 11/16/24 1437          Discharge Reassessment    Assessment Type Discharge Planning Reassessment     Did the patient's condition or plan change since previous assessment? No     Discharge Plan discussed with: Patient;Spouse/sig other     Name(s) and Number(s) Anthony Strong (Spouse)  [phone]     Communicated MIMI with patient/caregiver Yes     Discharge Plan A Home Health     Discharge Plan B Skilled Nursing Facility     DME Needed Upon Discharge  none     Transition of Care Barriers None     Why the patient remains in the hospital Requires continued medical care        Post-Acute Status    Hospital Resources/Appts/Education Provided Appointments scheduled and added to AVS     Discharge Delays None known at this time                       Discharge Plan A and Plan B have been determined by review of patient's clinical status, future medical and therapeutic needs, and coverage/benefits for post-acute care in coordination with multidisciplinary team members.    Adrianna Archuleta MSW, CSW

## 2024-11-16 NOTE — ASSESSMENT & PLAN NOTE
"Patient has Diastolic (HFpEF) heart failure that is Acute on chronic. On presentation their CHF was decompensated. Evidence of decompensated CHF on presentation includes: edema, elevated JVD, crackles on lung auscultation, weight gain, orthopnea, dyspnea on exertion (WHITLEY), and shortness of breath. The etiology of their decompensation is likely medication adjustments . Patient is on the HF pathway. Most recent BNP and echo results are listed below.  No results for input(s): "BNP" in the last 72 hours.    Latest ECHO  Results for orders placed during the hospital encounter of 09/24/24    Echo    Interpretation Summary    Left Ventricle: The left ventricle is normal in size. Normal wall thickness. There is concentric remodeling. There is normal systolic function with a visually estimated ejection fraction of 60 - 65%. Grade III diastolic dysfunction. Elevated left ventricular filling pressure.    Right Ventricle: Normal right ventricular cavity size. Wall thickness is normal. Systolic function is normal. Pacemaker lead present in the ventricle.    The left atrium is severely dilated.    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 It is reported to be a 26mm Phan Suzi S3 valve. Aortic valve area by VTI is 1.90 cm2. Aortic valve peak velocity is 1.64 m/s. Mean gradient is 6 mmHg. The dimensionless index is 0.57. There is mild aortic regurgitation.    Tricuspid Valve: There is mild regurgitation.    Pulmonary Artery: The estimated pulmonary artery systolic pressure is 48 mmHg.    IVC/SVC: Elevated venous pressure at 15 mmHg.    Pericardium: There is a small posterior effusion. No indication of cardiac tamponade.    Current Heart Failure Medications  spironolactone tablet 25 mg, Daily, Oral  furosemide injection 80 mg, 2 times daily, Intravenous  hydrALAZINE injection 10 mg, Every 6 hours PRN, Intravenous  carvediloL tablet 12.5 mg, 2 times daily with meals, Oral  losartan tablet " 50 mg, Daily, Oral    Plan  - Place on telemetry  - Place on fluid restriction of 1.5 L.   - Cardiology has not been consulted  - The patient's volume status is stable but not at their baseline as indicated by edema, elevated JVD, crackles on lung auscultation, weight gain, dyspnea on exertion (WHITLEY), and shortness of breath  - will continue to diurese with 80mg IV lasix BID given JVD elevation. Pt at baseline 2L NC.   - strict intake and output, daily standing weights    Intake/Output Summary (Last 24 hours) at 11/16/2024 1544  Last data filed at 11/16/2024 1312  Gross per 24 hour   Intake --   Output 1650 ml   Net -1650 ml

## 2024-11-16 NOTE — ASSESSMENT & PLAN NOTE
Patient found to have small pleural effusion on imaging. I have personally reviewed and interpreted the following imaging: CT. A thoracentesis was performed. Pleural fluid was sent for analysis. Labs reviewed, including  . Fluid most consistent with Transudate.  . Most likely etiology includes Congestive Heart Failure. Management to include Diuresis

## 2024-11-16 NOTE — HOSPITAL COURSE
S/p MICU admission for rescue BiPAP, diuresis, and UTI/CAP treatment weaned back to 2L prior to step down. Physical exam without obvious hypervolemia, but pt demonstrated JVD and Echo 11/14 with CVP 15. Continued to use BiPAP QHS and with long naps. Does not have a machine at home, but could benefit from OP Sleep Medicine evaluation. PT/OT evaluation recs for moderate-intensity therapy, but family reports significant family support and motivation to do therapy at home. Continues to be hypervolemic, continuing IV lasix.    11/21-  care for pt at home and dispenses medications.   - BP  126/58 Pulse 59   SpO2 100%   on IV diuresis. Lasix 80 mg iv q BB. Lorsatan, isosorbide, ranolazine, and spironolactone. And apixaban and plavix. Jardiance was stopped for uncertain reasons. Consider convert to po lasix in am     11/22- reporting HA and chest pain. Has dual paced rhythm, no changes on EKG. BP  188/77, Pulse 60 other VSS. Did not receive am meds yet. Mag  1.5- repleted.  UO= 1300. On lasix 80 IV bid. Her ususal home lasix dose ion 40 bid. Will convert to lasix 40 tid and monitor UO today.     11/23- UO on po lasix 40 q 8 is 1550 + unmeasured urinations. Blood pressure 171/72, pulse 60, note BP is before am meds, but goes down each afternoon.  May dc to home.  manages her medications. F/u  [facility] at home. HF transition clinic. And HH.

## 2024-11-16 NOTE — ASSESSMENT & PLAN NOTE
Patient with Hypoxic Respiratory failure which is Acute on chronic.  she is on home oxygen at 2 LPM. Supplemental oxygen was provided and noted- Oxygen Concentration (%):  [30] 30    .   Signs/symptoms of respiratory failure include- tachypnea and increased work of breathing. Contributing diagnoses includes - CHF Labs and images were reviewed. Patient Has not had a recent ABG. Will treat underlying causes and adjust management of respiratory failure as follows- diuresis

## 2024-11-16 NOTE — ASSESSMENT & PLAN NOTE
Echocardiogram with evidence of aortic stenosis.The patient's most recent echocardiogram result is listed below. We will manage the valvular abnormality by monitoring.     Echo    Result Date: 11/14/2024    Left Ventricle: The left ventricle is normal in size. Normal wall   thickness. There is normal systolic function with a visually estimated   ejection fraction of 60 - 65%. Ejection fraction is approximately 65%.    Right Ventricle: Normal right ventricular cavity size. Wall thickness   is normal. Systolic function is normal.    Left Atrium: Left atrium is moderately dilated.    Right Atrium: Right atrium is moderately dilated.    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 It is reported to be a 26 mm Phan Suzi valve. Mildly   calcified cusps. Aortic valve area by VTI is 0.8 cm2. Aortic valve peak   velocity is 2.9 m/s. Mean gradient is 21.8 mmHg. The dimensionless index   is 0.25.  msand AcT/ET ratio is 0.36. Findings and trend from prior   echocardiography evaluation is suggestive of prosthetic valve stenosis.   Clinical correlation is required.    Mitral Valve: There is moderate mitral annular calcification present.    Tricuspid Valve: PISA radius is 0.6cm and Vena Contracta is 0.7cm   consistent with moderate to severe regurgitation.    Pulmonary Artery: There is moderate to severe pulmonary hypertension.   The estimated pulmonary artery systolic pressure is 62 mmHg.    IVC/SVC: Elevated venous pressure at 15 mmHg.    Pericardium: There is a trivial circumferential effusion.        Echo    Result Date: 11/11/2024    Left Ventricle: The left ventricle is normal in size. Normal wall   thickness. There is normal systolic function. Ejection fraction is   approximately 65%. There is normal diastolic function.    Right Ventricle: Normal right ventricular cavity size. Wall thickness   is normal. Systolic function is normal. Pacemaker lead present in the    ventricle.    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 It is reported to be a 26 mm Phan valve. The leaflets appear   mildly thickened. Aortic valve area by VTI is 0.7 cm2. LVOT diameter is   1.9 cm. Aortic valve peak velocity is 3.3 m/s. Mean gradient is 33.3 mmHg.   The dimensionless index is 0.26. Mild paravalvular regurgitation.    Mitral Valve: There is moderate mitral annular calcification present.    Tricuspid Valve: There is moderate regurgitation.    Pulmonary Artery: There is pulmonary hypertension. The estimated   pulmonary artery systolic pressure is 62 mmHg.    IVC/SVC: Intermediate venous pressure at 8 mmHg.        Echo    Result Date: 9/25/2024    Left Ventricle: The left ventricle is normal in size. Normal wall   thickness. There is concentric remodeling. There is normal systolic   function with a visually estimated ejection fraction of 60 - 65%. Grade   III diastolic dysfunction. Elevated left ventricular filling pressure.    Right Ventricle: Normal right ventricular cavity size. Wall thickness   is normal. Systolic function is normal. Pacemaker lead present in the   ventricle.    The left atrium is severely dilated.    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 It is reported to be a 26mm Phan Suzi S3 valve. Aortic   valve area by VTI is 1.90 cm2. Aortic valve peak velocity is 1.64 m/s.   Mean gradient is 6 mmHg. The dimensionless index is 0.57. There is mild   aortic regurgitation.    Tricuspid Valve: There is mild regurgitation.    Pulmonary Artery: The estimated pulmonary artery systolic pressure is   48 mmHg.    IVC/SVC: Elevated venous pressure at 15 mmHg.    Pericardium: There is a small posterior effusion. No indication of   cardiac tamponade.        Echo    Result Date: 2/10/2024    Left Ventricle: The left ventricle is normal in size. Normal wall   thickness. There is concentric remodeling. Regional  wall motion   abnormalities present. There is moderately reduced systolic function with   a visually estimated ejection fraction of 35 - 40%. Ejection fraction by   visual approximation is 35%. There is normal diastolic function.    Right Ventricle: Normal right ventricular cavity size. Wall thickness   is normal. Right ventricle wall motion  is normal. Systolic function is   normal. Pacemaker lead present in the ventricle.    Left Atrium: Left atrium is moderately dilated.    Right Atrium: Right atrium is mildly dilated. Lead present in the right   atrium.    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 Aortic valve area by VTI is 1.03 cm². Aortic valve peak velocity   is 2.07 m/s. Mean gradient is 11 mmHg. The dimensionless index is 0.30.   There is trace aortic regurgitation.    Mitral Valve: There is mild mitral annular calcification present.    Tricuspid Valve: There is mild regurgitation.    Pulmonary Artery: There is mild to moderate pulmonary hypertension. The   estimated pulmonary artery systolic pressure is 47 mmHg.    IVC/SVC: Normal venous pressure at 3 mmHg.        - Continue judicious diuresis given forward-flow dependence for aortic valve stenosis  - Will need OP follow up after hospitalization to discuss post-TAVR aortic valve stenosis

## 2024-11-16 NOTE — ASSESSMENT & PLAN NOTE
Patient's FSGs are controlled on current medication regimen.  Last A1c reviewed-   Lab Results   Component Value Date    HGBA1C 5.2 11/10/2024     Most recent fingerstick glucose reviewed-   Recent Labs   Lab 11/15/24  1616 11/15/24  1950 11/16/24  0748   POCTGLUCOSE 150* 160* 105     Current correctional scale  Low  Maintain anti-hyperglycemic dose as follows-   Antihyperglycemics (From admission, onward)    Start     Stop Route Frequency Ordered    11/10/24 1513  insulin aspart U-100 pen 0-5 Units         -- SubQ Before meals & nightly PRN 11/10/24 1414        Hold Oral hypoglycemics while patient is in the hospital.

## 2024-11-16 NOTE — ASSESSMENT & PLAN NOTE
Patient has permanent atrial fibrillation. Patient is currently in sinus rhythm. [id number] Score: 5. The patients heart rate in the last 24 hours is as follows:  Pulse  Min: 59  Max: 65     Antiarrhythmics       Anticoagulants  apixaban tablet 5 mg, 2 times daily, Oral    Plan  - Replete lytes with a goal of K>4, Mg >2  - Patient is anticoagulated, see medications listed above.  - Patient's afib is currently controlled

## 2024-11-17 ENCOUNTER — PATIENT MESSAGE (OUTPATIENT)
Dept: CARDIOLOGY | Facility: CLINIC | Age: 79
End: 2024-11-17
Payer: MEDICARE

## 2024-11-17 LAB
ALBUMIN SERPL BCP-MCNC: 2.4 G/DL (ref 3.5–5.2)
ALP SERPL-CCNC: 35 U/L (ref 40–150)
ALT SERPL W/O P-5'-P-CCNC: 9 U/L (ref 10–44)
ANION GAP SERPL CALC-SCNC: 10 MMOL/L (ref 8–16)
ANION GAP SERPL CALC-SCNC: 11 MMOL/L (ref 8–16)
ANION GAP SERPL CALC-SCNC: 12 MMOL/L (ref 8–16)
AST SERPL-CCNC: 14 U/L (ref 10–40)
BACTERIA FLD CULT: NORMAL
BASOPHILS # BLD AUTO: 0.03 K/UL (ref 0–0.2)
BASOPHILS NFR BLD: 0.3 % (ref 0–1.9)
BILIRUB SERPL-MCNC: 0.4 MG/DL (ref 0.1–1)
BUN SERPL-MCNC: 32 MG/DL (ref 8–23)
BUN SERPL-MCNC: 32 MG/DL (ref 8–23)
BUN SERPL-MCNC: 34 MG/DL (ref 8–23)
CALCIUM SERPL-MCNC: 9 MG/DL (ref 8.7–10.5)
CALCIUM SERPL-MCNC: 9 MG/DL (ref 8.7–10.5)
CALCIUM SERPL-MCNC: 9.1 MG/DL (ref 8.7–10.5)
CHLORIDE SERPL-SCNC: 87 MMOL/L (ref 95–110)
CHLORIDE SERPL-SCNC: 88 MMOL/L (ref 95–110)
CHLORIDE SERPL-SCNC: 88 MMOL/L (ref 95–110)
CO2 SERPL-SCNC: 33 MMOL/L (ref 23–29)
CO2 SERPL-SCNC: 34 MMOL/L (ref 23–29)
CO2 SERPL-SCNC: 35 MMOL/L (ref 23–29)
CREAT SERPL-MCNC: 0.9 MG/DL (ref 0.5–1.4)
CREAT SERPL-MCNC: 1 MG/DL (ref 0.5–1.4)
CREAT SERPL-MCNC: 1 MG/DL (ref 0.5–1.4)
DIFFERENTIAL METHOD BLD: ABNORMAL
EOSINOPHIL # BLD AUTO: 0 K/UL (ref 0–0.5)
EOSINOPHIL NFR BLD: 0.3 % (ref 0–8)
ERYTHROCYTE [DISTWIDTH] IN BLOOD BY AUTOMATED COUNT: 17.8 % (ref 11.5–14.5)
EST. GFR  (NO RACE VARIABLE): 56.6 ML/MIN/1.73 M^2
EST. GFR  (NO RACE VARIABLE): 56.6 ML/MIN/1.73 M^2
EST. GFR  (NO RACE VARIABLE): >60 ML/MIN/1.73 M^2
GLUCOSE SERPL-MCNC: 88 MG/DL (ref 70–110)
GLUCOSE SERPL-MCNC: 89 MG/DL (ref 70–110)
GLUCOSE SERPL-MCNC: 90 MG/DL (ref 70–110)
HCT VFR BLD AUTO: 26.9 % (ref 37–48.5)
HGB BLD-MCNC: 8.7 G/DL (ref 12–16)
IMM GRANULOCYTES # BLD AUTO: 0.06 K/UL (ref 0–0.04)
IMM GRANULOCYTES NFR BLD AUTO: 0.5 % (ref 0–0.5)
LYMPHOCYTES # BLD AUTO: 0.9 K/UL (ref 1–4.8)
LYMPHOCYTES NFR BLD: 8.3 % (ref 18–48)
MAGNESIUM SERPL-MCNC: 1.7 MG/DL (ref 1.6–2.6)
MCH RBC QN AUTO: 27.2 PG (ref 27–31)
MCHC RBC AUTO-ENTMCNC: 32.3 G/DL (ref 32–36)
MCV RBC AUTO: 84 FL (ref 82–98)
MONOCYTES # BLD AUTO: 1.4 K/UL (ref 0.3–1)
MONOCYTES NFR BLD: 12.5 % (ref 4–15)
NEUTROPHILS # BLD AUTO: 8.6 K/UL (ref 1.8–7.7)
NEUTROPHILS NFR BLD: 78.1 % (ref 38–73)
NRBC BLD-RTO: 0 /100 WBC
PHOSPHATE SERPL-MCNC: 2.6 MG/DL (ref 2.7–4.5)
PLATELET # BLD AUTO: 309 K/UL (ref 150–450)
PMV BLD AUTO: 9.5 FL (ref 9.2–12.9)
POCT GLUCOSE: 104 MG/DL (ref 70–110)
POCT GLUCOSE: 108 MG/DL (ref 70–110)
POCT GLUCOSE: 168 MG/DL (ref 70–110)
POTASSIUM SERPL-SCNC: 4.1 MMOL/L (ref 3.5–5.1)
PROT SERPL-MCNC: 6 G/DL (ref 6–8.4)
RBC # BLD AUTO: 3.2 M/UL (ref 4–5.4)
SODIUM SERPL-SCNC: 132 MMOL/L (ref 136–145)
SODIUM SERPL-SCNC: 132 MMOL/L (ref 136–145)
SODIUM SERPL-SCNC: 133 MMOL/L (ref 136–145)
SODIUM SERPL-SCNC: 135 MMOL/L (ref 136–145)
WBC # BLD AUTO: 11.05 K/UL (ref 3.9–12.7)

## 2024-11-17 PROCEDURE — 25000003 PHARM REV CODE 250: Performed by: INTERNAL MEDICINE

## 2024-11-17 PROCEDURE — 80048 BASIC METABOLIC PNL TOTAL CA: CPT | Mod: 91 | Performed by: STUDENT IN AN ORGANIZED HEALTH CARE EDUCATION/TRAINING PROGRAM

## 2024-11-17 PROCEDURE — 21400001 HC TELEMETRY ROOM

## 2024-11-17 PROCEDURE — 80048 BASIC METABOLIC PNL TOTAL CA: CPT | Performed by: STUDENT IN AN ORGANIZED HEALTH CARE EDUCATION/TRAINING PROGRAM

## 2024-11-17 PROCEDURE — 94660 CPAP INITIATION&MGMT: CPT

## 2024-11-17 PROCEDURE — 25000003 PHARM REV CODE 250

## 2024-11-17 PROCEDURE — 99900035 HC TECH TIME PER 15 MIN (STAT)

## 2024-11-17 PROCEDURE — 63600175 PHARM REV CODE 636 W HCPCS

## 2024-11-17 PROCEDURE — 80053 COMPREHEN METABOLIC PANEL: CPT

## 2024-11-17 PROCEDURE — 25000003 PHARM REV CODE 250: Performed by: STUDENT IN AN ORGANIZED HEALTH CARE EDUCATION/TRAINING PROGRAM

## 2024-11-17 PROCEDURE — 85025 COMPLETE CBC W/AUTO DIFF WBC: CPT

## 2024-11-17 PROCEDURE — 84295 ASSAY OF SERUM SODIUM: CPT

## 2024-11-17 PROCEDURE — 27100171 HC OXYGEN HIGH FLOW UP TO 24 HOURS

## 2024-11-17 PROCEDURE — 94799 UNLISTED PULMONARY SVC/PX: CPT

## 2024-11-17 PROCEDURE — 36415 COLL VENOUS BLD VENIPUNCTURE: CPT

## 2024-11-17 PROCEDURE — 94761 N-INVAS EAR/PLS OXIMETRY MLT: CPT

## 2024-11-17 PROCEDURE — 84100 ASSAY OF PHOSPHORUS: CPT

## 2024-11-17 PROCEDURE — 83735 ASSAY OF MAGNESIUM: CPT

## 2024-11-17 RX ORDER — FUROSEMIDE 80 MG/1
80 TABLET ORAL 2 TIMES DAILY
Status: DISCONTINUED | OUTPATIENT
Start: 2024-11-17 | End: 2024-11-18

## 2024-11-17 RX ADMIN — ATORVASTATIN CALCIUM 10 MG: 10 TABLET, FILM COATED ORAL at 08:11

## 2024-11-17 RX ADMIN — POLYETHYLENE GLYCOL 3350 17 G: 17 POWDER, FOR SOLUTION ORAL at 08:11

## 2024-11-17 RX ADMIN — SENNOSIDES 8.6 MG: 8.6 TABLET, FILM COATED ORAL at 08:11

## 2024-11-17 RX ADMIN — ONDANSETRON 8 MG: 8 TABLET, ORALLY DISINTEGRATING ORAL at 11:11

## 2024-11-17 RX ADMIN — SPIRONOLACTONE 25 MG: 25 TABLET ORAL at 08:11

## 2024-11-17 RX ADMIN — WHITE PETROLATUM: 1.75 OINTMENT TOPICAL at 08:11

## 2024-11-17 RX ADMIN — LOSARTAN POTASSIUM 50 MG: 50 TABLET, FILM COATED ORAL at 08:11

## 2024-11-17 RX ADMIN — RANOLAZINE 1000 MG: 500 TABLET, EXTENDED RELEASE ORAL at 08:11

## 2024-11-17 RX ADMIN — ISOSORBIDE MONONITRATE 60 MG: 60 TABLET, EXTENDED RELEASE ORAL at 08:11

## 2024-11-17 RX ADMIN — CLOPIDOGREL BISULFATE 75 MG: 75 TABLET ORAL at 08:11

## 2024-11-17 RX ADMIN — APIXABAN 5 MG: 5 TABLET, FILM COATED ORAL at 08:11

## 2024-11-17 RX ADMIN — OXYCODONE HYDROCHLORIDE AND ACETAMINOPHEN 1000 MG: 500 TABLET ORAL at 08:11

## 2024-11-17 RX ADMIN — MONTELUKAST 10 MG: 10 TABLET, FILM COATED ORAL at 08:11

## 2024-11-17 RX ADMIN — ACETAMINOPHEN 650 MG: 325 TABLET ORAL at 06:11

## 2024-11-17 RX ADMIN — FUROSEMIDE 80 MG: 80 TABLET ORAL at 06:11

## 2024-11-17 RX ADMIN — Medication: at 08:11

## 2024-11-17 RX ADMIN — CARVEDILOL 12.5 MG: 12.5 TABLET, FILM COATED ORAL at 08:11

## 2024-11-17 RX ADMIN — DULOXETINE HYDROCHLORIDE 20 MG: 20 CAPSULE, DELAYED RELEASE ORAL at 08:11

## 2024-11-17 RX ADMIN — PANTOPRAZOLE SODIUM 40 MG: 40 TABLET, DELAYED RELEASE ORAL at 08:11

## 2024-11-17 RX ADMIN — ACETAMINOPHEN 650 MG: 325 TABLET ORAL at 08:11

## 2024-11-17 RX ADMIN — CARVEDILOL 12.5 MG: 12.5 TABLET, FILM COATED ORAL at 06:11

## 2024-11-17 RX ADMIN — FUROSEMIDE 80 MG: 10 INJECTION, SOLUTION INTRAVENOUS at 08:11

## 2024-11-17 RX ADMIN — GABAPENTIN 300 MG: 300 CAPSULE ORAL at 08:11

## 2024-11-17 NOTE — ASSESSMENT & PLAN NOTE
Patient's FSGs are controlled on current medication regimen.  Last A1c reviewed-   Lab Results   Component Value Date    HGBA1C 5.2 11/10/2024     Most recent fingerstick glucose reviewed-   Recent Labs   Lab 11/16/24  1721 11/16/24  1959 11/17/24  0834 11/17/24  1321   POCTGLUCOSE 139* 153* 108 168*       Current correctional scale  Low  Maintain anti-hyperglycemic dose as follows-   Antihyperglycemics (From admission, onward)    Start     Stop Route Frequency Ordered    11/10/24 1513  insulin aspart U-100 pen 0-5 Units         -- SubQ Before meals & nightly PRN 11/10/24 1414        Hold Oral hypoglycemics while patient is in the hospital.

## 2024-11-17 NOTE — ASSESSMENT & PLAN NOTE
Patient's COPD is controlled currently.  Patient is currently off COPD Pathway. Continue scheduled inhalers Supplemental oxygen and monitor respiratory status closely.     - prn duonebs  - titrate O2 to 88-92%, discussed with family

## 2024-11-17 NOTE — ASSESSMENT & PLAN NOTE
Echocardiogram with evidence of aortic stenosis.The patient's most recent echocardiogram result is listed below. We will manage the valvular abnormality by monitoring.     Echo    Result Date: 11/14/2024    Left Ventricle: The left ventricle is normal in size. Normal wall   thickness. There is normal systolic function with a visually estimated   ejection fraction of 60 - 65%. Ejection fraction is approximately 65%.    Right Ventricle: Normal right ventricular cavity size. Wall thickness   is normal. Systolic function is normal.    Left Atrium: Left atrium is moderately dilated.    Right Atrium: Right atrium is moderately dilated.  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 It is reported to be a 26 mm Phan Suzi valve. Mildly   calcified cusps. Aortic valve area by VTI is 0.8 cm2. Aortic valve peak   velocity is 2.9 m/s. Mean gradient is 21.8 mmHg. The dimensionless index   is 0.25.  msand AcT/ET ratio is 0.36. Findings and trend from prior   echocardiography evaluation is suggestive of prosthetic valve stenosis.   Clinical correlation is required.    Mitral Valve: There is moderate mitral annular calcification present.    Tricuspid Valve: PISA radius is 0.6cm and Vena Contracta is 0.7cm   consistent with moderate to severe regurgitation.    Pulmonary Artery: There is moderate to severe pulmonary hypertension.   The estimated pulmonary artery systolic pressure is 62 mmHg.    IVC/SVC: Elevated venous pressure at 15 mmHg.    Pericardium: There is a trivial circumferential effusion.        Echo    Result Date: 11/11/2024    Left Ventricle: The left ventricle is normal in size. Normal wall   thickness. There is normal systolic function. Ejection fraction is   approximately 65%. There is normal diastolic function.    Right Ventricle: Normal right ventricular cavity size. Wall thickness   is normal. Systolic function is normal. Pacemaker lead present in the    ventricle.  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 It is reported to be a 26 mm Phan valve. The leaflets appear   mildly thickened. Aortic valve area by VTI is 0.7 cm2. LVOT diameter is   1.9 cm. Aortic valve peak velocity is 3.3 m/s. Mean gradient is 33.3 mmHg.   The dimensionless index is 0.26. Mild paravalvular regurgitation.    Mitral Valve: There is moderate mitral annular calcification present.    Tricuspid Valve: There is moderate regurgitation.    Pulmonary Artery: There is pulmonary hypertension. The estimated   pulmonary artery systolic pressure is 62 mmHg.    IVC/SVC: Intermediate venous pressure at 8 mmHg.        Echo    Result Date: 9/25/2024    Left Ventricle: The left ventricle is normal in size. Normal wall   thickness. There is concentric remodeling. There is normal systolic   function with a visually estimated ejection fraction of 60 - 65%. Grade   III diastolic dysfunction. Elevated left ventricular filling pressure.    Right Ventricle: Normal right ventricular cavity size. Wall thickness   is normal. Systolic function is normal. Pacemaker lead present in the   ventricle.    The left atrium is severely dilated.  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 It is reported to be a 26mm Phan Suzi S3 valve. Aortic   valve area by VTI is 1.90 cm2. Aortic valve peak velocity is 1.64 m/s.   Mean gradient is 6 mmHg. The dimensionless index is 0.57. There is mild   aortic regurgitation.    Tricuspid Valve: There is mild regurgitation.    Pulmonary Artery: The estimated pulmonary artery systolic pressure is   48 mmHg.    IVC/SVC: Elevated venous pressure at 15 mmHg.    Pericardium: There is a small posterior effusion. No indication of   cardiac tamponade.        Echo    Result Date: 2/10/2024    Left Ventricle: The left ventricle is normal in size. Normal wall   thickness. There is concentric  remodeling. Regional wall motion   abnormalities present. There is moderately reduced systolic function with   a visually estimated ejection fraction of 35 - 40%. Ejection fraction by   visual approximation is 35%. There is normal diastolic function.    Right Ventricle: Normal right ventricular cavity size. Wall thickness   is normal. Right ventricle wall motion  is normal. Systolic function is   normal. Pacemaker lead present in the ventricle.    Left Atrium: Left atrium is moderately dilated.    Right Atrium: Right atrium is mildly dilated. Lead present in the right   atrium.  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 Aortic valve area by VTI is 1.03 cm². Aortic valve peak velocity   is 2.07 m/s. Mean gradient is 11 mmHg. The dimensionless index is 0.30.   There is trace aortic regurgitation.    Mitral Valve: There is mild mitral annular calcification present.    Tricuspid Valve: There is mild regurgitation.    Pulmonary Artery: There is mild to moderate pulmonary hypertension. The   estimated pulmonary artery systolic pressure is 47 mmHg.    IVC/SVC: Normal venous pressure at 3 mmHg.        - Continue judicious diuresis given forward-flow dependence for aortic valve stenosis  - Will need OP follow up after hospitalization to discuss post-TAVR aortic valve stenosis

## 2024-11-17 NOTE — ASSESSMENT & PLAN NOTE
Patient with Hypoxic Respiratory failure which is Acute on chronic.  she is on home oxygen at 2 LPM. Supplemental oxygen was provided and noted- Oxygen Concentration (%):  [30] 30    .   Signs/symptoms of respiratory failure include- tachypnea and increased work of breathing. Contributing diagnoses includes - CHF Labs and images were reviewed. Patient Has not had a recent ABG. Will treat underlying causes and adjust management of respiratory failure as follows- diuresis    - Discussed with family that goal O2 should be 88-92% given COPD. They will continue to titrate at home with pulse ox.

## 2024-11-17 NOTE — PLAN OF CARE
Pt slept well this shift. A&Ox 4. VSS. On 2L/BIPAP at night and day naps. Up to BSC and chair with x2 assist. Denies pain this shift. T&RQ2 as tolerated. Safety precautions in place. Call light and personal items in reach. No further concerns noted at this time. No acute distress noted this shift. Daughter at bedside. POC ongoing.     Problem: Adult Inpatient Plan of Care  Goal: Plan of Care Review  Outcome: Progressing  Goal: Patient-Specific Goal (Individualized)  Outcome: Progressing  Goal: Absence of Hospital-Acquired Illness or Injury  Outcome: Progressing  Goal: Optimal Comfort and Wellbeing  Outcome: Progressing  Goal: Readiness for Transition of Care  Outcome: Progressing     Problem: Infection  Goal: Absence of Infection Signs and Symptoms  Outcome: Progressing     Problem: Diabetes Comorbidity  Goal: Blood Glucose Level Within Targeted Range  Outcome: Progressing     Problem: Acute Kidney Injury/Impairment  Goal: Improved Oral Intake  Outcome: Progressing     Problem: Wound  Goal: Absence of Infection Signs and Symptoms  Outcome: Progressing     Problem: Fall Injury Risk  Goal: Absence of Fall and Fall-Related Injury  Outcome: Progressing

## 2024-11-17 NOTE — ASSESSMENT & PLAN NOTE
Patient has permanent atrial fibrillation. Patient is currently in sinus rhythm. [id number] Score: 5. The patients heart rate in the last 24 hours is as follows:  Pulse  Min: 59  Max: 65     Antiarrhythmics       Anticoagulants  apixaban tablet 5 mg, 2 times daily, Oral    Plan  - Replete lytes with a goal of K>4, Mg >2  - Patient is anticoagulated, see medications listed above.  - Patient's afib is currently controlled

## 2024-11-17 NOTE — ASSESSMENT & PLAN NOTE
Patients blood pressure range in the last 24 hours was: BP  Min: 95/46  Max: 221/86.The patient's inpatient anti-hypertensive regimen is listed below:  Current Antihypertensives  isosorbide mononitrate 24 hr tablet 60 mg, Daily, Oral  ranolazine 12 hr tablet 1,000 mg, 2 times daily, Oral  spironolactone tablet 25 mg, Daily, Oral  hydrALAZINE injection 10 mg, Every 6 hours PRN, Intravenous  carvediloL tablet 12.5 mg, 2 times daily with meals, Oral  losartan tablet 50 mg, Daily, Oral  furosemide tablet 80 mg, 2 times daily, Oral    Plan  - BP is uncontrolled, will adjust as follows: can consider increasing carvedilol if HR can tolerate   - states was previously on losartan, but recently discontinued. Consider resuming if clinically necessary.

## 2024-11-17 NOTE — ASSESSMENT & PLAN NOTE
Anemia is likely due to Iron deficiency. Most recent hemoglobin and hematocrit are listed below.  Recent Labs     11/15/24  0308 11/16/24  0444 11/17/24  0836   HGB 8.7* 9.2* 8.7*   HCT 27.4* 27.7* 26.9*       Plan  - Monitor serial CBC: Daily  - Transfuse PRBC if patient becomes hemodynamically unstable, symptomatic or H/H drops below 7/21.  - Patient has not received any PRBC transfusions to date  - Patient's anemia is currently stable

## 2024-11-17 NOTE — SUBJECTIVE & OBJECTIVE
Interval History: Some nausea, no vomiting. Suspect gut edema from hypervolemia. Decreased urine output but did have voiding in bedside commode while trying to have BM this AM. Last BM on Sat, typically has one every 3-4d at home. Family and pt looking to do HH PT/OT and will need an appreciated DME evaluation from PT/OT.     Review of Systems   Constitutional:  Negative for activity change, appetite change and fever.   Respiratory:  Negative for cough, shortness of breath and wheezing.    Cardiovascular:  Negative for chest pain and leg swelling.   Gastrointestinal:  Positive for nausea. Negative for abdominal pain, constipation, diarrhea and vomiting.   Skin:  Negative for rash.   Neurological:  Negative for headaches.     Objective:     Vital Signs (Most Recent):  Temp: 98.3 °F (36.8 °C) (11/17/24 0827)  Pulse: 61 (11/17/24 0827)  Resp: 20 (11/17/24 0827)  BP: 135/60 (11/17/24 0827)  SpO2: 100 % (11/17/24 0827) Vital Signs (24h Range):  Temp:  [97.4 °F (36.3 °C)-98.3 °F (36.8 °C)] 98.3 °F (36.8 °C)  Pulse:  [59-65] 61  Resp:  [18-20] 20  SpO2:  [98 %-100 %] 100 %  BP: ()/(46-70) 135/60     Weight: 95 kg (209 lb 7 oz)  Body mass index is 35.95 kg/m².  No intake or output data in the 24 hours ending 11/17/24 1426      Physical Exam  Vitals and nursing note reviewed.   HENT:      Head: Normocephalic and atraumatic.   Eyes:      Extraocular Movements: Extraocular movements intact.      Conjunctiva/sclera: Conjunctivae normal.   Neck:      Comments: JVD to mid-neck  Cardiovascular:      Rate and Rhythm: Normal rate and regular rhythm.      Pulses: Normal pulses.   Pulmonary:      Effort: Pulmonary effort is normal. No respiratory distress.      Breath sounds: No wheezing or rales.   Abdominal:      Palpations: Abdomen is soft.      Tenderness: There is abdominal tenderness (epigastric to deep palpation). There is no guarding.   Musculoskeletal:      Right lower leg: No edema.      Left lower leg: No edema.    Skin:     General: Skin is warm and dry.   Neurological:      General: No focal deficit present.      Mental Status: She is alert and oriented to person, place, and time.   Psychiatric:         Mood and Affect: Mood normal.             Significant Labs: All pertinent labs within the past 24 hours have been reviewed.    Significant Imaging: I have reviewed all pertinent imaging results/findings within the past 24 hours.

## 2024-11-17 NOTE — PLAN OF CARE
Problem: Adult Inpatient Plan of Care  Goal: Plan of Care Review  Outcome: Progressing  Goal: Patient-Specific Goal (Individualized)  Outcome: Progressing  Goal: Absence of Hospital-Acquired Illness or Injury  Outcome: Progressing  Goal: Optimal Comfort and Wellbeing  Outcome: Progressing  Goal: Readiness for Transition of Care  Outcome: Progressing     Problem: Infection  Goal: Absence of Infection Signs and Symptoms  Outcome: Progressing     Problem: Diabetes Comorbidity  Goal: Blood Glucose Level Within Targeted Range  Outcome: Progressing     Problem: Acute Kidney Injury/Impairment  Goal: Fluid and Electrolyte Balance  Outcome: Progressing  Goal: Improved Oral Intake  Outcome: Progressing  Goal: Effective Renal Function  Outcome: Progressing     Problem: Wound  Goal: Optimal Coping  Outcome: Progressing  Goal: Optimal Functional Ability  Outcome: Progressing  Goal: Absence of Infection Signs and Symptoms  Outcome: Progressing  Goal: Improved Oral Intake  Outcome: Progressing  Goal: Optimal Pain Control and Function  Outcome: Progressing  Goal: Skin Health and Integrity  Outcome: Progressing  Goal: Optimal Wound Healing  Outcome: Progressing     Problem: Fall Injury Risk  Goal: Absence of Fall and Fall-Related Injury  Outcome: Progressing     Problem: Skin Injury Risk Increased  Goal: Skin Health and Integrity  Outcome: Progressing

## 2024-11-17 NOTE — NURSING
Shift Note    Pt slept well this shift. VSS. Pain managed with PRN's. PIV in place and patent. Skin care completed. T&RQ2 as tolerated. Safety precautions in place. Call light in reach. No further concerns noted at this time.

## 2024-11-17 NOTE — PROGRESS NOTES
[facility] ([place] [id number])  [facility] Medicine  Progress Note    Patient Name: Adriana Strong  MRN: [id number]  Patient Class: IP- Inpatient   Admission Date: 11/10/2024  Length of Stay: 7 days  Attending Physician: Bradley Rivera MD  Primary Care Provider: Krzysztof Mcgraw MD        Subjective:     Principal Problem:Acute on chronic diastolic congestive heart failure        HPI:  Adriana Strong is a 79 y.o.F with hx of diastolic HF, Afib on elqiuis, s/p pacemaker, HLD, HTN, non obstructive CAD, s/p TAVR, asthma, COPD, on home oxygen (2L via NC) who presents to [facility] for complaints of worsening shortness of breath, weight gain. Patient reports SOB at rest and with exertion. States she saw cardiology last week where labs were performed. She was notified that her sodium was low and MD requested that patient hold her lasix x3d, continue spironolactone and after the 3d start lasix 40mg daily and additional prn. Otherwise, patient reports compliance with medications, fluid restriction, Na restriction. Also endorsing dysuria for the last couple of days as well as decreased oral intake. Denies fever, chills, chest pain, palpitations, abdominal pain, n/v/d, headaches, or any other symptoms at this time.     In ED: AF, hypertensive, tachypneic on 2-4L via NC. CBC with chronic, stable anemia. Na 128, K 5.6, otherwise CMP unremarkable. Mag 1.5. . Trop negative. TSH 2.559. POC lactate 1.96. UA with 3+ protein, 1+ leukocyte esterase, 17 WBCs, many bacteria, 23 squams. Culture pending. POC US in ED with trace pericardial effusion. CXR with left pleural effusion and congestive change/edema, developing left lower lung zone consolidation not excluded in this hypoventilatory exam. S/p lasix 80mg inj x2 in ED. Admitted to  for further evaluation    Overview/Hospital Course:  S/p MICU admission for rescue BiPAP, diuresis, and UTI/CAP treatment weaned back to 2L prior to step down. Physical exam without obvious  hypervolemia, but pt demonstrated JVD and Echo 11/14 with CVP 15. Continued to use BiPAP QHS and with long naps. Does not have a machine at home, but could benefit from OP Sleep Medicine evaluation. PT/OT evaluation recs for moderate-intensity therapy, but family reports significant family support and motivation to do therapy at home.     Interval History: Some nausea, no vomiting. Suspect gut edema from hypervolemia. Decreased urine output but did have voiding in bedside commode while trying to have BM this AM. Last BM on Sat, typically has one every 3-4d at home. Family and pt looking to do HH PT/OT and will need an appreciated DME evaluation from PT/OT.     Review of Systems   Constitutional:  Negative for activity change, appetite change and fever.   Respiratory:  Negative for cough, shortness of breath and wheezing.    Cardiovascular:  Negative for chest pain and leg swelling.   Gastrointestinal:  Positive for nausea. Negative for abdominal pain, constipation, diarrhea and vomiting.   Skin:  Negative for rash.   Neurological:  Negative for headaches.     Objective:     Vital Signs (Most Recent):  Temp: 98.3 °F (36.8 °C) (11/17/24 0827)  Pulse: 61 (11/17/24 0827)  Resp: 20 (11/17/24 0827)  BP: 135/60 (11/17/24 0827)  SpO2: 100 % (11/17/24 0827) Vital Signs (24h Range):  Temp:  [97.4 °F (36.3 °C)-98.3 °F (36.8 °C)] 98.3 °F (36.8 °C)  Pulse:  [59-65] 61  Resp:  [18-20] 20  SpO2:  [98 %-100 %] 100 %  BP: ()/(46-70) 135/60     Weight: 95 kg (209 lb 7 oz)  Body mass index is 35.95 kg/m².  No intake or output data in the 24 hours ending 11/17/24 1426      Physical Exam  Vitals and nursing note reviewed.   HENT:      Head: Normocephalic and atraumatic.   Eyes:      Extraocular Movements: Extraocular movements intact.      Conjunctiva/sclera: Conjunctivae normal.   Neck:      Comments: JVD to mid-neck  Cardiovascular:      Rate and Rhythm: Normal rate and regular rhythm.      Pulses: Normal pulses.   Pulmonary:     "  Effort: Pulmonary effort is normal. No respiratory distress.      Breath sounds: No wheezing or rales.   Abdominal:      Palpations: Abdomen is soft.      Tenderness: There is abdominal tenderness (epigastric to deep palpation). There is no guarding.   Musculoskeletal:      Right lower leg: No edema.      Left lower leg: No edema.   Skin:     General: Skin is warm and dry.   Neurological:      General: No focal deficit present.      Mental Status: She is alert and oriented to person, place, and time.   Psychiatric:         Mood and Affect: Mood normal.             Significant Labs: All pertinent labs within the past 24 hours have been reviewed.    Significant Imaging: I have reviewed all pertinent imaging results/findings within the past 24 hours.    Assessment/Plan:      * Acute on chronic diastolic congestive heart failure  Patient has Diastolic (HFpEF) heart failure that is Acute on chronic. On presentation their CHF was decompensated. Evidence of decompensated CHF on presentation includes: edema, elevated JVD, crackles on lung auscultation, weight gain, orthopnea, dyspnea on exertion (WHITLEY), and shortness of breath. The etiology of their decompensation is likely medication adjustments . Patient is on the HF pathway. Most recent BNP and echo results are listed below.  No results for input(s): "BNP" in the last 72 hours.    Latest ECHO  Results for orders placed during the hospital encounter of 09/24/24    Echo    Interpretation Summary    Left Ventricle: The left ventricle is normal in size. Normal wall thickness. There is concentric remodeling. There is normal systolic function with a visually estimated ejection fraction of 60 - 65%. Grade III diastolic dysfunction. Elevated left ventricular filling pressure.    Right Ventricle: Normal right ventricular cavity size. Wall thickness is normal. Systolic function is normal. Pacemaker lead present in the ventricle.    The left atrium is severely dilated.    Aortic " Valve: There is a transcatheter valve replacement in the aortic position. It is reported to be a 26mm Phan Suzi S3 valve. Aortic valve area by VTI is 1.90 cm2. Aortic valve peak velocity is 1.64 m/s. Mean gradient is 6 mmHg. The dimensionless index is 0.57. There is mild aortic regurgitation.    Tricuspid Valve: There is mild regurgitation.    Pulmonary Artery: The estimated pulmonary artery systolic pressure is 48 mmHg.    IVC/SVC: Elevated venous pressure at 15 mmHg.    Pericardium: There is a small posterior effusion. No indication of cardiac tamponade.    Current Heart Failure Medications  spironolactone tablet 25 mg, Daily, Oral  hydrALAZINE injection 10 mg, Every 6 hours PRN, Intravenous  carvediloL tablet 12.5 mg, 2 times daily with meals, Oral  losartan tablet 50 mg, Daily, Oral  furosemide tablet 80 mg, 2 times daily, Oral    Plan  - Place on telemetry  - Place on fluid restriction of 1.5 L.   - Cardiology has not been consulted  - The patient's volume status is stable but not at their baseline as indicated by edema, elevated JVD, crackles on lung auscultation, weight gain, dyspnea on exertion (WHITLEY), and shortness of breath  - Transition to po Lasix 80mg BID (home dose 40mg BID). Pt at baseline 2L NC.   - strict intake and output, daily standing weights  No intake or output data in the 24 hours ending 11/17/24 1429          Irritant contact dermatitis due to friction or contact with body fluids, unspecified  Sacral wound with appreciated recs by Wound Care      Anxiety  - home Lorazepam      Pleural effusion  Patient found to have small pleural effusion on imaging. I have personally reviewed and interpreted the following imaging: CT. A thoracentesis was performed. Pleural fluid was sent for analysis. Labs reviewed, including  . Fluid most consistent with Transudate.  . Most likely etiology includes Congestive Heart Failure. Management to include Diuresis      Hypomagnesemia  Patient has Abnormal  Magnesium: hypomagnesemia. Will continue to monitor electrolytes closely. Will replace the affected electrolytes and repeat labs to be done after interventions completed. The patient's magnesium results have been reviewed and are listed below.  Recent Labs   Lab 11/10/24  1200   MG 1.5*        Hyperkalemia  The patients most recent potassium results are listed below.  Recent Labs     11/08/24  1015 11/10/24  1200   K 4.7 5.6*     Plan  - Monitor for arrhythmias with EKG and/or continuous telemetry.   - Treat the hyperkalemia with Potassium Binders.   - Monitor potassium: Daily  - The patient's hyperkalemia is stable    Anemia  Anemia is likely due to Iron deficiency. Most recent hemoglobin and hematocrit are listed below.  Recent Labs     11/15/24  0308 11/16/24  0444 11/17/24  0836   HGB 8.7* 9.2* 8.7*   HCT 27.4* 27.7* 26.9*       Plan  - Monitor serial CBC: Daily  - Transfuse PRBC if patient becomes hemodynamically unstable, symptomatic or H/H drops below 7/21.  - Patient has not received any PRBC transfusions to date  - Patient's anemia is currently stable    Acute on chronic respiratory failure with hypoxia  Patient with Hypoxic Respiratory failure which is Acute on chronic.  she is on home oxygen at 2 LPM. Supplemental oxygen was provided and noted- Oxygen Concentration (%):  [30] 30    .   Signs/symptoms of respiratory failure include- tachypnea and increased work of breathing. Contributing diagnoses includes - CHF Labs and images were reviewed. Patient Has not had a recent ABG. Will treat underlying causes and adjust management of respiratory failure as follows- diuresis    - Discussed with family that goal O2 should be 88-92% given COPD. They will continue to titrate at home with pulse ox.     CAD (coronary artery disease)  - history noted   - continue statin, plavix    Acute cystitis without hematuria  - patient with reports of dysuria and dark urine noted at bedside  - UA infectious appearing, but with 23  anne marie  - urine culture with E Fecalis, treated for 5 day course with Zosyn      Hyponatremia  Hyponatremia is likely due to Heart Failure. The patient's most recent sodium results are listed below.  Recent Labs     11/16/24  2017 11/17/24  0041 11/17/24  0836   * 135* 133*  133*  133*  132*  132*       Plan  - Correct the sodium by 4-6mEq in 24 hours.   - Obtain the following studies: Urine sodium, urine osmolality, serum osmolality or TSH, T4.  - Will treat the hyponatremia with diuresis  - Monitor sodium Daily.   - Patient hyponatremia is stable    COPD (chronic obstructive pulmonary disease)  Patient's COPD is controlled currently.  Patient is currently off COPD Pathway. Continue scheduled inhalers Supplemental oxygen and monitor respiratory status closely.     - prn duonebs  - titrate O2 to 88-92%, discussed with family    Aortic stenosis  Echocardiogram with evidence of aortic stenosis.The patient's most recent echocardiogram result is listed below. We will manage the valvular abnormality by monitoring.     Echo    Result Date: 11/14/2024    Left Ventricle: The left ventricle is normal in size. Normal wall   thickness. There is normal systolic function with a visually estimated   ejection fraction of 60 - 65%. Ejection fraction is approximately 65%.    Right Ventricle: Normal right ventricular cavity size. Wall thickness   is normal. Systolic function is normal.    Left Atrium: Left atrium is moderately dilated.    Right Atrium: Right atrium is moderately dilated.    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 It is reported to be a 26 mm Phan Suzi valve. Mildly   calcified cusps. Aortic valve area by VTI is 0.8 cm2. Aortic valve peak   velocity is 2.9 m/s. Mean gradient is 21.8 mmHg. The dimensionless index   is 0.25.  msand AcT/ET ratio is 0.36. Findings and trend from prior   echocardiography evaluation is suggestive of prosthetic valve stenosis.    Clinical correlation is required.    Mitral Valve: There is moderate mitral annular calcification present.    Tricuspid Valve: PISA radius is 0.6cm and Vena Contracta is 0.7cm   consistent with moderate to severe regurgitation.    Pulmonary Artery: There is moderate to severe pulmonary hypertension.   The estimated pulmonary artery systolic pressure is 62 mmHg.    IVC/SVC: Elevated venous pressure at 15 mmHg.    Pericardium: There is a trivial circumferential effusion.        Echo    Result Date: 11/11/2024    Left Ventricle: The left ventricle is normal in size. Normal wall   thickness. There is normal systolic function. Ejection fraction is   approximately 65%. There is normal diastolic function.    Right Ventricle: Normal right ventricular cavity size. Wall thickness   is normal. Systolic function is normal. Pacemaker lead present in the   ventricle.    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 It is reported to be a 26 mm Phan valve. The leaflets appear   mildly thickened. Aortic valve area by VTI is 0.7 cm2. LVOT diameter is   1.9 cm. Aortic valve peak velocity is 3.3 m/s. Mean gradient is 33.3 mmHg.   The dimensionless index is 0.26. Mild paravalvular regurgitation.    Mitral Valve: There is moderate mitral annular calcification present.    Tricuspid Valve: There is moderate regurgitation.    Pulmonary Artery: There is pulmonary hypertension. The estimated   pulmonary artery systolic pressure is 62 mmHg.    IVC/SVC: Intermediate venous pressure at 8 mmHg.        Echo    Result Date: 9/25/2024    Left Ventricle: The left ventricle is normal in size. Normal wall   thickness. There is concentric remodeling. There is normal systolic   function with a visually estimated ejection fraction of 60 - 65%. Grade   III diastolic dysfunction. Elevated left ventricular filling pressure.    Right Ventricle: Normal right ventricular cavity size. Wall thickness   is normal.  Systolic function is normal. Pacemaker lead present in the   ventricle.    The left atrium is severely dilated.    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 It is reported to be a 26mm Phan Suzi S3 valve. Aortic   valve area by VTI is 1.90 cm2. Aortic valve peak velocity is 1.64 m/s.   Mean gradient is 6 mmHg. The dimensionless index is 0.57. There is mild   aortic regurgitation.    Tricuspid Valve: There is mild regurgitation.    Pulmonary Artery: The estimated pulmonary artery systolic pressure is   48 mmHg.    IVC/SVC: Elevated venous pressure at 15 mmHg.    Pericardium: There is a small posterior effusion. No indication of   cardiac tamponade.        Echo    Result Date: 2/10/2024    Left Ventricle: The left ventricle is normal in size. Normal wall   thickness. There is concentric remodeling. Regional wall motion   abnormalities present. There is moderately reduced systolic function with   a visually estimated ejection fraction of 35 - 40%. Ejection fraction by   visual approximation is 35%. There is normal diastolic function.    Right Ventricle: Normal right ventricular cavity size. Wall thickness   is normal. Right ventricle wall motion  is normal. Systolic function is   normal. Pacemaker lead present in the ventricle.    Left Atrium: Left atrium is moderately dilated.    Right Atrium: Right atrium is mildly dilated. Lead present in the right   atrium.    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 Aortic valve area by VTI is 1.03 cm². Aortic valve peak velocity   is 2.07 m/s. Mean gradient is 11 mmHg. The dimensionless index is 0.30.   There is trace aortic regurgitation.    Mitral Valve: There is mild mitral annular calcification present.    Tricuspid Valve: There is mild regurgitation.    Pulmonary Artery: There is mild to moderate pulmonary hypertension. The   estimated pulmonary artery systolic pressure is 47 mmHg.     IVC/SVC: Normal venous pressure at 3 mmHg.        - Continue judicious diuresis given forward-flow dependence for aortic valve stenosis  - Will need OP follow up after hospitalization to discuss post-TAVR aortic valve stenosis    Permanent atrial fibrillation  Patient has permanent atrial fibrillation. Patient is currently in sinus rhythm. [id number] Score: 5. The patients heart rate in the last 24 hours is as follows:  Pulse  Min: 59  Max: 65     Antiarrhythmics       Anticoagulants  apixaban tablet 5 mg, 2 times daily, Oral    Plan  - Replete lytes with a goal of K>4, Mg >2  - Patient is anticoagulated, see medications listed above.  - Patient's afib is currently controlled        Essential hypertension  Patients blood pressure range in the last 24 hours was: BP  Min: 95/46  Max: 221/86.The patient's inpatient anti-hypertensive regimen is listed below:  Current Antihypertensives  isosorbide mononitrate 24 hr tablet 60 mg, Daily, Oral  ranolazine 12 hr tablet 1,000 mg, 2 times daily, Oral  spironolactone tablet 25 mg, Daily, Oral  hydrALAZINE injection 10 mg, Every 6 hours PRN, Intravenous  carvediloL tablet 12.5 mg, 2 times daily with meals, Oral  losartan tablet 50 mg, Daily, Oral  furosemide tablet 80 mg, 2 times daily, Oral    Plan  - BP is uncontrolled, will adjust as follows: can consider increasing carvedilol if HR can tolerate   - states was previously on losartan, but recently discontinued. Consider resuming if clinically necessary.    Gastroesophageal reflux disease  - continue home PPI      HLD (hyperlipidemia)  - continue home statin      Type 2 diabetes mellitus  Patient's FSGs are controlled on current medication regimen.  Last A1c reviewed-   Lab Results   Component Value Date    HGBA1C 5.2 11/10/2024     Most recent fingerstick glucose reviewed-   Recent Labs   Lab 11/16/24  1721 11/16/24  1959 11/17/24  0834 11/17/24  1321   POCTGLUCOSE 139* 153* 108 168*       Current correctional scale   Low  Maintain anti-hyperglycemic dose as follows-   Antihyperglycemics (From admission, onward)      Start     Stop Route Frequency Ordered    11/10/24 1513  insulin aspart U-100 pen 0-5 Units         -- SubQ Before meals & nightly PRN 11/10/24 1414          Hold Oral hypoglycemics while patient is in the hospital.      VTE Risk Mitigation (From admission, onward)           Ordered     apixaban tablet 5 mg  2 times daily         11/11/24 1535     IP VTE HIGH RISK PATIENT  Once         11/10/24 1414     Place sequential compression device  Until discontinued         11/10/24 1414     Reason for No Pharmacological VTE Prophylaxis  Once        Question:  Reasons:  Answer:  Already adequately anticoagulated on oral Anticoagulants    11/10/24 1414     Place sequential compression device  Until discontinued         11/10/24 1414     Place sequential compression device  Until discontinued         11/10/24 1414                    Discharge Planning   MIMI: 11/18/2024     Code Status: Full Code   Is the patient medically ready for discharge?:     Reason for patient still in hospital (select all that apply): Treatment, dispo planning  Discharge Plan A: Home Health   Discharge Delays: None known at this time              Bradley Rivera MD  Department of Hospital Medicine   [facility] - [facility] ([place])

## 2024-11-17 NOTE — ASSESSMENT & PLAN NOTE
Hyponatremia is likely due to Heart Failure. The patient's most recent sodium results are listed below.  Recent Labs     11/16/24  2017 11/17/24  0041 11/17/24  0836   * 135* 133*  133*  133*  132*  132*       Plan  - Correct the sodium by 4-6mEq in 24 hours.   - Obtain the following studies: Urine sodium, urine osmolality, serum osmolality or TSH, T4.  - Will treat the hyponatremia with diuresis  - Monitor sodium Daily.   - Patient hyponatremia is stable

## 2024-11-17 NOTE — ASSESSMENT & PLAN NOTE
"Patient has Diastolic (HFpEF) heart failure that is Acute on chronic. On presentation their CHF was decompensated. Evidence of decompensated CHF on presentation includes: edema, elevated JVD, crackles on lung auscultation, weight gain, orthopnea, dyspnea on exertion (WHITLEY), and shortness of breath. The etiology of their decompensation is likely medication adjustments . Patient is on the HF pathway. Most recent BNP and echo results are listed below.  No results for input(s): "BNP" in the last 72 hours.    Latest ECHO  Results for orders placed during the hospital encounter of 09/24/24    Echo    Interpretation Summary    Left Ventricle: The left ventricle is normal in size. Normal wall thickness. There is concentric remodeling. There is normal systolic function with a visually estimated ejection fraction of 60 - 65%. Grade III diastolic dysfunction. Elevated left ventricular filling pressure.    Right Ventricle: Normal right ventricular cavity size. Wall thickness is normal. Systolic function is normal. Pacemaker lead present in the ventricle.    The left atrium is severely dilated.    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 It is reported to be a 26mm Phan Suzi S3 valve. Aortic valve area by VTI is 1.90 cm2. Aortic valve peak velocity is 1.64 m/s. Mean gradient is 6 mmHg. The dimensionless index is 0.57. There is mild aortic regurgitation.    Tricuspid Valve: There is mild regurgitation.    Pulmonary Artery: The estimated pulmonary artery systolic pressure is 48 mmHg.    IVC/SVC: Elevated venous pressure at 15 mmHg.    Pericardium: There is a small posterior effusion. No indication of cardiac tamponade.    Current Heart Failure Medications  spironolactone tablet 25 mg, Daily, Oral  hydrALAZINE injection 10 mg, Every 6 hours PRN, Intravenous  carvediloL tablet 12.5 mg, 2 times daily with meals, Oral  losartan tablet 50 mg, Daily, Oral  furosemide tablet 80 mg, 2 times " daily, Oral    Plan  - Place on telemetry  - Place on fluid restriction of 1.5 L.   - Cardiology has not been consulted  - The patient's volume status is stable but not at their baseline as indicated by edema, elevated JVD, crackles on lung auscultation, weight gain, dyspnea on exertion (WHITLEY), and shortness of breath  - Transition to po Lasix 80mg BID (home dose 40mg BID). Pt at baseline 2L NC.   - strict intake and output, daily standing weights  No intake or output data in the 24 hours ending 11/17/24 4424

## 2024-11-18 LAB
ALBUMIN SERPL BCP-MCNC: 2.3 G/DL (ref 3.5–5.2)
ALP SERPL-CCNC: 36 U/L (ref 40–150)
ALT SERPL W/O P-5'-P-CCNC: 10 U/L (ref 10–44)
ANION GAP SERPL CALC-SCNC: 9 MMOL/L (ref 8–16)
AST SERPL-CCNC: 14 U/L (ref 10–40)
BACTERIA #/AREA URNS AUTO: ABNORMAL /HPF
BASOPHILS # BLD AUTO: 0.04 K/UL (ref 0–0.2)
BASOPHILS NFR BLD: 0.5 % (ref 0–1.9)
BILIRUB SERPL-MCNC: 0.4 MG/DL (ref 0.1–1)
BILIRUB UR QL STRIP: NEGATIVE
BNP SERPL-MCNC: 1194 PG/ML (ref 0–99)
BUN SERPL-MCNC: 40 MG/DL (ref 8–23)
CALCIUM SERPL-MCNC: 8.8 MG/DL (ref 8.7–10.5)
CHLORIDE SERPL-SCNC: 86 MMOL/L (ref 95–110)
CK SERPL-CCNC: 14 U/L (ref 20–180)
CLARITY UR REFRACT.AUTO: ABNORMAL
CO2 SERPL-SCNC: 36 MMOL/L (ref 23–29)
COLOR UR AUTO: ABNORMAL
CREAT SERPL-MCNC: 1.2 MG/DL (ref 0.5–1.4)
DIFFERENTIAL METHOD BLD: ABNORMAL
EOSINOPHIL # BLD AUTO: 0 K/UL (ref 0–0.5)
EOSINOPHIL NFR BLD: 0.5 % (ref 0–8)
ERYTHROCYTE [DISTWIDTH] IN BLOOD BY AUTOMATED COUNT: 17.9 % (ref 11.5–14.5)
EST. GFR  (NO RACE VARIABLE): 45.5 ML/MIN/1.73 M^2
GLUCOSE SERPL-MCNC: 88 MG/DL (ref 70–110)
GLUCOSE UR QL STRIP: NEGATIVE
HCT VFR BLD AUTO: 24.3 % (ref 37–48.5)
HGB BLD-MCNC: 8 G/DL (ref 12–16)
HGB UR QL STRIP: ABNORMAL
HYALINE CASTS UR QL AUTO: 0 /LPF
IMM GRANULOCYTES # BLD AUTO: 0.05 K/UL (ref 0–0.04)
IMM GRANULOCYTES NFR BLD AUTO: 0.6 % (ref 0–0.5)
KETONES UR QL STRIP: NEGATIVE
LEUKOCYTE ESTERASE UR QL STRIP: ABNORMAL
LYMPHOCYTES # BLD AUTO: 1.3 K/UL (ref 1–4.8)
LYMPHOCYTES NFR BLD: 15.6 % (ref 18–48)
MAGNESIUM SERPL-MCNC: 1.7 MG/DL (ref 1.6–2.6)
MCH RBC QN AUTO: 27.8 PG (ref 27–31)
MCHC RBC AUTO-ENTMCNC: 32.9 G/DL (ref 32–36)
MCV RBC AUTO: 84 FL (ref 82–98)
MICROSCOPIC COMMENT: ABNORMAL
MONOCYTES # BLD AUTO: 1 K/UL (ref 0.3–1)
MONOCYTES NFR BLD: 11.7 % (ref 4–15)
NEUTROPHILS # BLD AUTO: 5.8 K/UL (ref 1.8–7.7)
NEUTROPHILS NFR BLD: 71.1 % (ref 38–73)
NITRITE UR QL STRIP: NEGATIVE
NRBC BLD-RTO: 0 /100 WBC
PH UR STRIP: 6 [PH] (ref 5–8)
PHOSPHATE SERPL-MCNC: 2.6 MG/DL (ref 2.7–4.5)
PLATELET # BLD AUTO: 292 K/UL (ref 150–450)
PMV BLD AUTO: 9.9 FL (ref 9.2–12.9)
POCT GLUCOSE: 113 MG/DL (ref 70–110)
POCT GLUCOSE: 129 MG/DL (ref 70–110)
POCT GLUCOSE: 185 MG/DL (ref 70–110)
POTASSIUM SERPL-SCNC: 4.4 MMOL/L (ref 3.5–5.1)
PROT SERPL-MCNC: 5.6 G/DL (ref 6–8.4)
PROT UR QL STRIP: ABNORMAL
RBC # BLD AUTO: 2.88 M/UL (ref 4–5.4)
RBC #/AREA URNS AUTO: >100 /HPF (ref 0–4)
SODIUM SERPL-SCNC: 131 MMOL/L (ref 136–145)
SP GR UR STRIP: 1.01 (ref 1–1.03)
URN SPEC COLLECT METH UR: ABNORMAL
WBC # BLD AUTO: 8.2 K/UL (ref 3.9–12.7)
WBC #/AREA URNS AUTO: 97 /HPF (ref 0–5)

## 2024-11-18 PROCEDURE — 51701 INSERT BLADDER CATHETER: CPT

## 2024-11-18 PROCEDURE — 25000003 PHARM REV CODE 250: Performed by: INTERNAL MEDICINE

## 2024-11-18 PROCEDURE — 97530 THERAPEUTIC ACTIVITIES: CPT

## 2024-11-18 PROCEDURE — 25000003 PHARM REV CODE 250: Performed by: STUDENT IN AN ORGANIZED HEALTH CARE EDUCATION/TRAINING PROGRAM

## 2024-11-18 PROCEDURE — 36415 COLL VENOUS BLD VENIPUNCTURE: CPT

## 2024-11-18 PROCEDURE — 25000003 PHARM REV CODE 250

## 2024-11-18 PROCEDURE — 81001 URINALYSIS AUTO W/SCOPE: CPT

## 2024-11-18 PROCEDURE — 94761 N-INVAS EAR/PLS OXIMETRY MLT: CPT

## 2024-11-18 PROCEDURE — 94799 UNLISTED PULMONARY SVC/PX: CPT

## 2024-11-18 PROCEDURE — 27100171 HC OXYGEN HIGH FLOW UP TO 24 HOURS

## 2024-11-18 PROCEDURE — 87086 URINE CULTURE/COLONY COUNT: CPT

## 2024-11-18 PROCEDURE — 99900035 HC TECH TIME PER 15 MIN (STAT)

## 2024-11-18 PROCEDURE — 21400001 HC TELEMETRY ROOM

## 2024-11-18 PROCEDURE — 80053 COMPREHEN METABOLIC PANEL: CPT

## 2024-11-18 PROCEDURE — 85025 COMPLETE CBC W/AUTO DIFF WBC: CPT

## 2024-11-18 PROCEDURE — 63600175 PHARM REV CODE 636 W HCPCS

## 2024-11-18 PROCEDURE — 97110 THERAPEUTIC EXERCISES: CPT | Mod: CQ

## 2024-11-18 PROCEDURE — 87106 FUNGI IDENTIFICATION YEAST: CPT

## 2024-11-18 PROCEDURE — 94660 CPAP INITIATION&MGMT: CPT

## 2024-11-18 PROCEDURE — 83880 ASSAY OF NATRIURETIC PEPTIDE: CPT

## 2024-11-18 PROCEDURE — 87088 URINE BACTERIA CULTURE: CPT

## 2024-11-18 PROCEDURE — 84100 ASSAY OF PHOSPHORUS: CPT

## 2024-11-18 PROCEDURE — 83735 ASSAY OF MAGNESIUM: CPT

## 2024-11-18 PROCEDURE — 82550 ASSAY OF CK (CPK): CPT

## 2024-11-18 RX ORDER — FUROSEMIDE 10 MG/ML
80 INJECTION INTRAMUSCULAR; INTRAVENOUS EVERY 12 HOURS
Status: DISCONTINUED | OUTPATIENT
Start: 2024-11-18 | End: 2024-11-22

## 2024-11-18 RX ORDER — FUROSEMIDE 40 MG/1
TABLET ORAL
Qty: 60 TABLET | Refills: 11 | Status: SHIPPED | OUTPATIENT
Start: 2024-11-18

## 2024-11-18 RX ADMIN — Medication: at 08:11

## 2024-11-18 RX ADMIN — DULOXETINE HYDROCHLORIDE 20 MG: 20 CAPSULE, DELAYED RELEASE ORAL at 08:11

## 2024-11-18 RX ADMIN — RANOLAZINE 1000 MG: 500 TABLET, EXTENDED RELEASE ORAL at 08:11

## 2024-11-18 RX ADMIN — CARVEDILOL 12.5 MG: 12.5 TABLET, FILM COATED ORAL at 08:11

## 2024-11-18 RX ADMIN — OXYCODONE HYDROCHLORIDE AND ACETAMINOPHEN 1000 MG: 500 TABLET ORAL at 08:11

## 2024-11-18 RX ADMIN — APIXABAN 5 MG: 5 TABLET, FILM COATED ORAL at 08:11

## 2024-11-18 RX ADMIN — FUROSEMIDE 80 MG: 10 INJECTION, SOLUTION INTRAVENOUS at 01:11

## 2024-11-18 RX ADMIN — LORAZEPAM 0.5 MG: 0.5 TABLET ORAL at 06:11

## 2024-11-18 RX ADMIN — FUROSEMIDE 80 MG: 80 TABLET ORAL at 08:11

## 2024-11-18 RX ADMIN — CLOPIDOGREL BISULFATE 75 MG: 75 TABLET ORAL at 08:11

## 2024-11-18 RX ADMIN — MONTELUKAST 10 MG: 10 TABLET, FILM COATED ORAL at 08:11

## 2024-11-18 RX ADMIN — CARVEDILOL 12.5 MG: 12.5 TABLET, FILM COATED ORAL at 05:11

## 2024-11-18 RX ADMIN — LOSARTAN POTASSIUM 50 MG: 50 TABLET, FILM COATED ORAL at 08:11

## 2024-11-18 RX ADMIN — POLYETHYLENE GLYCOL 3350 17 G: 17 POWDER, FOR SOLUTION ORAL at 08:11

## 2024-11-18 RX ADMIN — SPIRONOLACTONE 25 MG: 25 TABLET ORAL at 08:11

## 2024-11-18 RX ADMIN — PANTOPRAZOLE SODIUM 40 MG: 40 TABLET, DELAYED RELEASE ORAL at 08:11

## 2024-11-18 RX ADMIN — ATORVASTATIN CALCIUM 10 MG: 10 TABLET, FILM COATED ORAL at 08:11

## 2024-11-18 RX ADMIN — SENNOSIDES 8.6 MG: 8.6 TABLET, FILM COATED ORAL at 08:11

## 2024-11-18 RX ADMIN — ISOSORBIDE MONONITRATE 60 MG: 60 TABLET, EXTENDED RELEASE ORAL at 08:11

## 2024-11-18 RX ADMIN — GABAPENTIN 300 MG: 300 CAPSULE ORAL at 08:11

## 2024-11-18 RX ADMIN — FUROSEMIDE 80 MG: 10 INJECTION, SOLUTION INTRAVENOUS at 08:11

## 2024-11-18 NOTE — PT/OT/SLP PROGRESS
"Occupational Therapy   Treatment    Name: Adriana Strong  MRN: [id number]  Admitting Diagnosis:  Acute on chronic diastolic congestive heart failure       Recommendations:     Discharge Recommendations: Moderate Intensity Therapy  Discharge Equipment Recommendations:  to be determined by next level of care  Barriers to discharge:  None    Assessment:     Adriana Strong is a 79 y.o. female with a medical diagnosis of Acute on chronic diastolic congestive heart failure.  She presents with having worked with PT but agreed to OT. Performance deficits affecting function are weakness, impaired endurance, impaired self care skills, impaired functional mobility, gait instability, decreased lower extremity function, decreased upper extremity function, impaired balance. Recommending moderate intensity therapy once medically appropriate for discharge to increase maximal independence, reduce burden of care, and ensure safety. Patient would benefit from continued skilled acute OT 4x/wk to improve functional mobility, increase independence with ADLs, and address established goals.    Rehab Prognosis:  Good; patient would benefit from acute skilled OT services to address these deficits and reach maximum level of function.       Plan:     Patient to be seen 4 x/week to address the above listed problems via self-care/home management, therapeutic activities, therapeutic exercises  Plan of Care Expires: 12/14/24  Plan of Care Reviewed with: patient, spouse    Subjective   " I'll get up again if you want me to"  Chief Complaint:weak arms and legs  Patient/Family Comments/goals: to siut in bedside chair regularly  Pain/Comfort:  Pain Rating 1: 0/10  Location - Side 1: Left  Location - Orientation 1: lateral  Location 1: other (see comments) (buttocks)  Pain Addressed 1: Reposition, Cessation of Activity, Distraction    Objective:     Communicated with: RN,  prior to session.  Patient found HOB elevated with oxygen, telemetry, " "PureWick upon OT entry to room.    General Precautions: Standard, fall    Orthopedic Precautions:N/A  Braces: N/A  Respiratory Status: Nasal cannula, flow 2 L/min     Occupational Performance:     Bed Mobility:    Patient completed Rolling/Turning to Left with  contact guard assistance  Patient completed Scooting/Bridging with contact guard assistance  Patient completed Supine to Sit with contact guard assistance     Functional Mobility/Transfers:  Patient completed Sit <> Stand Transfer with minimum assistance  with  hand-held assist and rolling walker   Patient completed Bed <> Chair Transfer using Step Transfer technique with moderate assistance and maximal assistance with hand-held assist and rolling walker  Functional Mobility: Pt able to stand pivot transfer with increasing assist level as transfer proceeded. Pt "plopped" in chair when she felt it on the back of her legs".  Pt completed 4 sets sit<>stand with vc's needed for proper hand placement. Pt able to stand <1 minute each trial with kyphotic posture noted.    Activities of Daily Living:  Lower Body Dressing: maximal assistance don socks supine.  Toileting: maximal assistance with PureWIck      [facility] 6 Click ADL: 17    Treatment & Education:  Role of OT and POC  Safety  ADL retraining  Functional mobility training  Discharge planning  Importance of EOB/OOB activity      Patient left up in chair with all lines intact, call button in reach, RN notified, and  present    GOALS:   Multidisciplinary Problems       Occupational Therapy Goals          Problem: Occupational Therapy    Goal Priority Disciplines Outcome Interventions   Occupational Therapy Goal     OT, PT/OT Progressing    Description: Goals to be met by: 12/14/24 (1 mo)     Patient will increase functional independence with ADLs by performing:    UE Dressing with [place].  LE Dressing with [place].  Grooming while standing at sink with [place].  Toileting from toilet with " Bullock for hygiene and clothing management.   Rolling to Bilateral with Bullock.   Supine to sit with Bullock.  Step transfer with Bullock  Toilet transfer to toilet with Bullock.                         Time Tracking:     OT Date of Treatment: 11/18/24  OT Start Time: 1305  OT Stop Time: 1339  OT Total Time (min): 34 min    Billable Minutes:Therapeutic Activity 34    OT/MELISSA: OT          11/18/2024

## 2024-11-18 NOTE — PROGRESS NOTES
"                    Medical Nutrition Therapy    Reason for Assessment: RD follow-up  Dx: HF  Medical Hx: COPD    General Info: Pt tolerating diet w/ good appetite. Low Na diet education complete 11/11. Appears nourished w/ UBW of 210# - no indicators of malnutrition noted.     Current Diet: Cardiac, Diabetic w/ 1500 mL FR  % intake of meals: 50-75%    Ht: 5'4"  Wt: 94.5kg   BMI: 35.8    Labs: Reviewed - A1C 5.2  Meds: Reviewed    Overall Physical Appearance: Well nourished    Level of Risk: Low    Nutrition Dx: No nutrition diagnosis at this time.    RD follow-up? Yes    Jenny MS, RD, LDN     "

## 2024-11-18 NOTE — PLAN OF CARE
Pt slept well this shift. A&Ox 4. VSS. On 2L/BIPAP at night and naps. NSR on tele. Medicated for headache pain. T&RQ2 as tolerated.  at bedside. Up to BSC with x2 assist. Purewick in place. Safety precautions in place. Call light and personal items in reach. No further concerns noted at this time. No acute distress noted. POC ongoing.     Problem: Adult Inpatient Plan of Care  Goal: Plan of Care Review  Outcome: Progressing  Goal: Patient-Specific Goal (Individualized)  Outcome: Progressing  Goal: Absence of Hospital-Acquired Illness or Injury  Outcome: Progressing  Goal: Optimal Comfort and Wellbeing  Outcome: Progressing  Goal: Readiness for Transition of Care  Outcome: Progressing     Problem: Infection  Goal: Absence of Infection Signs and Symptoms  Outcome: Progressing     Problem: Diabetes Comorbidity  Goal: Blood Glucose Level Within Targeted Range  Outcome: Progressing     Problem: Wound  Goal: Optimal Coping  Outcome: Progressing  Goal: Improved Oral Intake  Outcome: Progressing  Goal: Optimal Pain Control and Function  Outcome: Progressing  Goal: Skin Health and Integrity  Outcome: Progressing  Goal: Optimal Wound Healing  Outcome: Progressing     Problem: Fall Injury Risk  Goal: Absence of Fall and Fall-Related Injury  Outcome: Progressing

## 2024-11-18 NOTE — NURSING
Pt AAO x 4, no c/o pain. VSS, 97% on RA. PIVs intact and flush well. PT/OT worked with the pt today. Pt up to chair with a walker and x 1 assist. Purewick changed x 2. Urine dark with blood present. Dr. Lincoln notified. In and Out catheter done for UA with culture. Pt tolerated procedure well. 1500ML fluid restriction maintained. Pt educated on fluid restriction. Pt verbalized understanding. Position changed frequently using a wedge. Heel boots in place. No acute events this shift. Family supportive at the bedside. Safety measures in place. Call light in reach.

## 2024-11-18 NOTE — PT/OT/SLP PROGRESS
Physical Therapy Treatment    Patient Name:  Adriana Strong   MRN:  [id number]    Recommendations:     Discharge Recommendations: Moderate Intensity Therapy  Discharge Equipment Recommendations: none  Barriers to discharge:  current level of assistance required     Assessment:     Adriana Strong is a 79 y.o. female admitted with a medical diagnosis of Acute on chronic diastolic congestive heart failure.  She presents with the following impairments/functional limitations: weakness, impaired endurance, impaired self care skills, decreased safety awareness, decreased lower extremity function, decreased upper extremity function, impaired cardiopulmonary response to activity Pt tolerated treatment session fairly well today. Pt required assistance for bed mobility. With prolonged sitting pt began reporting chest tightness/heaviness and increasing dizziness. Pt returned supine and RN notified. Pt reporting symptoms improving with supine rest, and was able to tolerate supine there ex. Patient remains appropriate for continued skilled services within the acute environment and goals remain appropriate.   .    Rehab Prognosis: Good; patient would benefit from acute skilled PT services to address these deficits and reach maximum level of function.    Recent Surgery: * No surgery found *      Plan:     During this hospitalization, patient to be seen 4 x/week to address the identified rehab impairments via gait training, therapeutic activities, therapeutic exercises and progress toward the following goals:    Plan of Care Expires:  12/12/24    Subjective     Chief Complaint: Chest tightness   Patient/Family Comments/goals: Pt agreeable to PT   Pain/Comfort:  Pain Rating 1: 0/10      Objective:     Communicated with Rn prior to session.  Patient found supine with telemetry, oxygen, PureWick upon PT entry to room.     General Precautions: Standard, fall  Orthopedic Precautions:    Braces:    Respiratory Status: Nasal cannula      Functional Mobility:  Bed Mobility:     Scooting: moderate assistance  Supine to Sit: moderate assistance  EOB sitting: CGA   Sit to Supine: maximal assistance    Pt performed 10 repetitions of seated B LE exercises consisting of: LAQ  Pt performed 10 repetitions of supine B LE exercises consisting of: Marching, resisted leg kick. Hip ADD/ABD and AP         AM-PAC 6 CLICK MOBILITY  Turning over in bed (including adjusting bedclothes, sheets and blankets)?: 2  Sitting down on and standing up from a chair with arms (e.g., wheelchair, bedside commode, etc.): 2  Moving from lying on back to sitting on the side of the bed?: 2  Moving to and from a bed to a chair (including a wheelchair)?: 2  Need to walk in hospital room?: 2  Climbing 3-5 steps with a railing?: 1  Basic Mobility Total Score: 11       Treatment & Education:  Therapist provided instruction and educated for safety during bed mobility and EOB sitting. As well as proper body mechanics, energy conservation, and fall prevention strategies during tasks listed above, and the effects of prolonged immobility and the importance of performing EOB/OOB activity and exercises to promote healing and reduce recovery time.       Patient left supine with all lines intact, call button in reach, Rn notified, and family  present..    GOALS:   Multidisciplinary Problems       Physical Therapy Goals          Problem: Physical Therapy    Goal Priority Disciplines Outcome Interventions   Physical Therapy Goal     PT, PT/OT Progressing    Description: Goals to be met by: 24     Patient will increase functional independence with mobility by performin. Supine to sit with Stand-by Assistance  2. Sit to stand transfer with Stand-by Assistance with RW.   3. Bed to chair transfer with Stand-by Assistance using Rolling Walker  4. Gait  x 100 feet with Stand-by Assistance using Rolling Walker.   5. Lower extremity exercise program x15 reps per handout, with assistance as  needed to increase tolerance to activities.                          Time Tracking:     PT Received On: 11/18/24  PT Start Time: 1321     PT Stop Time: 1339  PT Total Time (min): 18 min     Billable Minutes: Therapeutic Exercise 18    Treatment Type: Treatment  PT/PTA: PTA     Number of PTA visits since last PT visit: 1 11/18/2024

## 2024-11-18 NOTE — PLAN OF CARE
SW spoke with pt and spouse at the bedside in regards to dispo plan and spouse stated that pt will be going home upon discharge.     [facility] - Stepdown Flex ([place])  Discharge Reassessment    Primary Care Provider: Krzysztof Mcgraw MD    Expected Discharge Date: 11/18/2024    Reassessment (most recent)       Discharge Reassessment - 11/18/24 1508          Discharge Reassessment    Assessment Type Discharge Planning Reassessment     Did the patient's condition or plan change since previous assessment? Yes     Discharge Plan discussed with: Spouse/sig other;Patient     Name(s) and Number(s) Anthony Strong (Spouse)  [phone]     Communicated MIMI with patient/caregiver Yes     Discharge Plan A Home Health     Discharge Plan B Home with family     Transition of Care Barriers None     Why the patient remains in the hospital Requires continued medical care        Post-Acute Status    Post-Acute Authorization Home Health     Home Health Status Referrals Sent     Hospital Resources/Appts/Education Provided Appointments scheduled and added to AVS     Discharge Delays None known at this time                       Discharge Plan A and Plan B have been determined by review of patient's clinical status, future medical and therapeutic needs, and coverage/benefits for post-acute care in coordination with multidisciplinary team members.    Adrianna Archuleta MSW, CSW

## 2024-11-19 LAB
ALBUMIN SERPL BCP-MCNC: 2.3 G/DL (ref 3.5–5.2)
ALP SERPL-CCNC: 36 U/L (ref 40–150)
ALT SERPL W/O P-5'-P-CCNC: 10 U/L (ref 10–44)
ANION GAP SERPL CALC-SCNC: 10 MMOL/L (ref 8–16)
AST SERPL-CCNC: 16 U/L (ref 10–40)
BACTERIA UR CULT: ABNORMAL
BASOPHILS # BLD AUTO: 0.03 K/UL (ref 0–0.2)
BASOPHILS NFR BLD: 0.5 % (ref 0–1.9)
BILIRUB SERPL-MCNC: 0.4 MG/DL (ref 0.1–1)
BUN SERPL-MCNC: 38 MG/DL (ref 8–23)
CALCIUM SERPL-MCNC: 9 MG/DL (ref 8.7–10.5)
CHLORIDE SERPL-SCNC: 85 MMOL/L (ref 95–110)
CO2 SERPL-SCNC: 34 MMOL/L (ref 23–29)
CREAT SERPL-MCNC: 1.2 MG/DL (ref 0.5–1.4)
DIFFERENTIAL METHOD BLD: ABNORMAL
EOSINOPHIL # BLD AUTO: 0 K/UL (ref 0–0.5)
EOSINOPHIL NFR BLD: 0.3 % (ref 0–8)
ERYTHROCYTE [DISTWIDTH] IN BLOOD BY AUTOMATED COUNT: 17.8 % (ref 11.5–14.5)
EST. GFR  (NO RACE VARIABLE): 45.5 ML/MIN/1.73 M^2
GLUCOSE SERPL-MCNC: 86 MG/DL (ref 70–110)
HCT VFR BLD AUTO: 24.2 % (ref 37–48.5)
HGB BLD-MCNC: 7.7 G/DL (ref 12–16)
IMM GRANULOCYTES # BLD AUTO: 0.05 K/UL (ref 0–0.04)
IMM GRANULOCYTES NFR BLD AUTO: 0.8 % (ref 0–0.5)
LYMPHOCYTES # BLD AUTO: 1 K/UL (ref 1–4.8)
LYMPHOCYTES NFR BLD: 16.3 % (ref 18–48)
MAGNESIUM SERPL-MCNC: 1.6 MG/DL (ref 1.6–2.6)
MCH RBC QN AUTO: 26.6 PG (ref 27–31)
MCHC RBC AUTO-ENTMCNC: 31.8 G/DL (ref 32–36)
MCV RBC AUTO: 84 FL (ref 82–98)
MONOCYTES # BLD AUTO: 0.9 K/UL (ref 0.3–1)
MONOCYTES NFR BLD: 13.7 % (ref 4–15)
NEUTROPHILS # BLD AUTO: 4.3 K/UL (ref 1.8–7.7)
NEUTROPHILS NFR BLD: 68.4 % (ref 38–73)
NRBC BLD-RTO: 0 /100 WBC
PHOSPHATE SERPL-MCNC: 2.6 MG/DL (ref 2.7–4.5)
PLATELET # BLD AUTO: 277 K/UL (ref 150–450)
PMV BLD AUTO: 9.5 FL (ref 9.2–12.9)
POCT GLUCOSE: 139 MG/DL (ref 70–110)
POCT GLUCOSE: 156 MG/DL (ref 70–110)
POCT GLUCOSE: 161 MG/DL (ref 70–110)
POCT GLUCOSE: 88 MG/DL (ref 70–110)
POTASSIUM SERPL-SCNC: 4.3 MMOL/L (ref 3.5–5.1)
PROT SERPL-MCNC: 6 G/DL (ref 6–8.4)
RBC # BLD AUTO: 2.89 M/UL (ref 4–5.4)
SODIUM SERPL-SCNC: 129 MMOL/L (ref 136–145)
WBC # BLD AUTO: 6.3 K/UL (ref 3.9–12.7)

## 2024-11-19 PROCEDURE — 99900035 HC TECH TIME PER 15 MIN (STAT)

## 2024-11-19 PROCEDURE — 97110 THERAPEUTIC EXERCISES: CPT | Mod: CQ

## 2024-11-19 PROCEDURE — 94660 CPAP INITIATION&MGMT: CPT

## 2024-11-19 PROCEDURE — 85025 COMPLETE CBC W/AUTO DIFF WBC: CPT

## 2024-11-19 PROCEDURE — 80053 COMPREHEN METABOLIC PANEL: CPT

## 2024-11-19 PROCEDURE — 25000003 PHARM REV CODE 250: Performed by: STUDENT IN AN ORGANIZED HEALTH CARE EDUCATION/TRAINING PROGRAM

## 2024-11-19 PROCEDURE — 21400001 HC TELEMETRY ROOM

## 2024-11-19 PROCEDURE — 25000003 PHARM REV CODE 250

## 2024-11-19 PROCEDURE — 97110 THERAPEUTIC EXERCISES: CPT

## 2024-11-19 PROCEDURE — 84100 ASSAY OF PHOSPHORUS: CPT

## 2024-11-19 PROCEDURE — 36415 COLL VENOUS BLD VENIPUNCTURE: CPT

## 2024-11-19 PROCEDURE — 63600175 PHARM REV CODE 636 W HCPCS

## 2024-11-19 PROCEDURE — 94761 N-INVAS EAR/PLS OXIMETRY MLT: CPT

## 2024-11-19 PROCEDURE — 25000003 PHARM REV CODE 250: Performed by: INTERNAL MEDICINE

## 2024-11-19 PROCEDURE — 25000003 PHARM REV CODE 250: Performed by: EMERGENCY MEDICINE

## 2024-11-19 PROCEDURE — 27100171 HC OXYGEN HIGH FLOW UP TO 24 HOURS

## 2024-11-19 PROCEDURE — 97530 THERAPEUTIC ACTIVITIES: CPT

## 2024-11-19 PROCEDURE — 83735 ASSAY OF MAGNESIUM: CPT

## 2024-11-19 RX ORDER — MAGNESIUM SULFATE HEPTAHYDRATE 40 MG/ML
2 INJECTION, SOLUTION INTRAVENOUS ONCE
Status: COMPLETED | OUTPATIENT
Start: 2024-11-19 | End: 2024-11-19

## 2024-11-19 RX ADMIN — APIXABAN 5 MG: 5 TABLET, FILM COATED ORAL at 09:11

## 2024-11-19 RX ADMIN — Medication: at 09:11

## 2024-11-19 RX ADMIN — LOSARTAN POTASSIUM 50 MG: 50 TABLET, FILM COATED ORAL at 09:11

## 2024-11-19 RX ADMIN — FUROSEMIDE 80 MG: 10 INJECTION, SOLUTION INTRAVENOUS at 09:11

## 2024-11-19 RX ADMIN — CLOPIDOGREL BISULFATE 75 MG: 75 TABLET ORAL at 09:11

## 2024-11-19 RX ADMIN — RANOLAZINE 1000 MG: 500 TABLET, EXTENDED RELEASE ORAL at 09:11

## 2024-11-19 RX ADMIN — POLYETHYLENE GLYCOL 3350 17 G: 17 POWDER, FOR SOLUTION ORAL at 09:11

## 2024-11-19 RX ADMIN — GABAPENTIN 300 MG: 300 CAPSULE ORAL at 09:11

## 2024-11-19 RX ADMIN — DULOXETINE HYDROCHLORIDE 20 MG: 20 CAPSULE, DELAYED RELEASE ORAL at 09:11

## 2024-11-19 RX ADMIN — LORAZEPAM 0.5 MG: 0.5 TABLET ORAL at 09:11

## 2024-11-19 RX ADMIN — SENNOSIDES 8.6 MG: 8.6 TABLET, FILM COATED ORAL at 09:11

## 2024-11-19 RX ADMIN — OXYCODONE HYDROCHLORIDE AND ACETAMINOPHEN 1000 MG: 500 TABLET ORAL at 09:11

## 2024-11-19 RX ADMIN — CARVEDILOL 12.5 MG: 12.5 TABLET, FILM COATED ORAL at 09:11

## 2024-11-19 RX ADMIN — MONTELUKAST 10 MG: 10 TABLET, FILM COATED ORAL at 09:11

## 2024-11-19 RX ADMIN — ATORVASTATIN CALCIUM 10 MG: 10 TABLET, FILM COATED ORAL at 09:11

## 2024-11-19 RX ADMIN — Medication 6 MG: at 09:11

## 2024-11-19 RX ADMIN — ISOSORBIDE MONONITRATE 60 MG: 60 TABLET, EXTENDED RELEASE ORAL at 09:11

## 2024-11-19 RX ADMIN — PANTOPRAZOLE SODIUM 40 MG: 40 TABLET, DELAYED RELEASE ORAL at 09:11

## 2024-11-19 RX ADMIN — SPIRONOLACTONE 25 MG: 25 TABLET ORAL at 09:11

## 2024-11-19 RX ADMIN — CARVEDILOL 12.5 MG: 12.5 TABLET, FILM COATED ORAL at 05:11

## 2024-11-19 RX ADMIN — MAGNESIUM SULFATE IN WATER 2 G: 40 INJECTION, SOLUTION INTRAVENOUS at 12:11

## 2024-11-19 NOTE — PT/OT/SLP PROGRESS
"Physical Therapy Co Treatment    Patient Name:  Adriana Strong   MRN:  [id number]    Recommendations:     Discharge Recommendations: Moderate Intensity Therapy  Discharge Equipment Recommendations: none  Barriers to discharge:  current level of assistance     Assessment:     Adriana Strong is a 79 y.o. female admitted with a medical diagnosis of Acute on chronic diastolic congestive heart failure.  She presents with the following impairments/functional limitations: weakness, impaired endurance, impaired self care skills, impaired functional mobility, gait instability, impaired balance, decreased safety awareness, decreased coordination, decreased upper extremity function, decreased lower extremity function, impaired cardiopulmonary response to activity. Pt was found up in chair and was eating lunch. PTA returned after lunch to assist pt back to bed. Pt tolerated LE strengthening activities well but did have LE buckling in standing.  Co-treatment performed due to patient's multiple deficits requiring two skilled therapists to appropriately and safely assess patient's strength and endurance while facilitating functional tasks in addition to accommodating for patient's activity tolerance.    Rehab Prognosis: Good; patient would benefit from acute skilled PT services to address these deficits and reach maximum level of function.    Recent Surgery: * No surgery found *      Plan:     During this hospitalization, patient to be seen 4 x/week to address the identified rehab impairments via gait training, therapeutic activities, therapeutic exercises, neuromuscular re-education and progress toward the following goals:    Plan of Care Expires:  12/12/24    Subjective     Chief Complaint: "I'm okay."  Patient/Family Comments/goals: None verbalized  Pain/Comfort:  Pain Rating 1: 0/10      Objective:     Communicated with RN Fabiola prior to session.  Patient found up in chair with telemetry, oxygen, PureWick upon PT entry to room. "     General Precautions: Standard, fall  Orthopedic Precautions: N/A  Braces: N/A  Respiratory Status: Nasal cannula, flow 2 L/min     Functional Mobility:  Bed Mobility:     Scooting: maximal assistance  Sit to Supine: maximal assistance  Transfers:     Sit to Stand:  minimum assistance with rolling walker  Bed to Chair: maximal assistance with  hand-held assist  using  Stand Pivot  Gait: Not performed due to LE buckling in STS  Balance: Sitting:supervision; standing:     Therapeutic Exercise: Patient performed 2 set(s) of 10 repetitions of  the following seated exercises: ankle pumps, long arc quads, marches, hip abduction, hip adduction, and knee flexion for bilateral LE. Patient required skilled PT for instruction of exercises and appropriate cues to perform exercises safely and appropriately.       AM-PAC 6 CLICK MOBILITY  Turning over in bed (including adjusting bedclothes, sheets and blankets)?: 2  Sitting down on and standing up from a chair with arms (e.g., wheelchair, bedside commode, etc.): 2  Moving from lying on back to sitting on the side of the bed?: 2  Moving to and from a bed to a chair (including a wheelchair)?: 2  Need to walk in hospital room?: 2  Climbing 3-5 steps with a railing?: 2  Basic Mobility Total Score: 12       Treatment & Education:  Pt educated on there purpose, goals and benefits of today's session. Pt informed on progress made and improved tolerance to activity today. Pt was given VC's throughout session for sequencing, hand placements and safety. Pt was encourage to follow commands to improve outcome of activities being performed.     Patient left HOB elevated with all lines intact, call button in reach, bed alarm on, and RN notified.    GOALS:   Multidisciplinary Problems       Physical Therapy Goals          Problem: Physical Therapy    Goal Priority Disciplines Outcome Interventions   Physical Therapy Goal     PT, PT/OT Progressing    Description: Goals to be met by: 12/12/24      Patient will increase functional independence with mobility by performin. Supine to sit with Stand-by Assistance  2. Sit to stand transfer with Stand-by Assistance with RW.   3. Bed to chair transfer with Stand-by Assistance using Rolling Walker  4. Gait  x 100 feet with Stand-by Assistance using Rolling Walker.   5. Lower extremity exercise program x15 reps per handout, with assistance as needed to increase tolerance to activities.                          Time Tracking:     PT Received On: 24  PT Start Time: 1325     PT Stop Time: 1340  PT Total Time (min): 15 min     Billable Minutes: Therapeutic Exercise 15    Treatment Type: Treatment  PT/PTA: PTA     Number of PTA visits since last PT visit: 2     2024

## 2024-11-19 NOTE — PLAN OF CARE
Unit [facility] Care Support Interaction      I have reviewed the chart of Adriana Strong who is hospitalized for Acute on chronic diastolic congestive heart failure. The patient is currently located in the following unit: 14W       I have seen and examined the patient and provided the following support:     Readmission Reduction - Acute Care at Home Referral       Emma Morrison PA-C  Unit Based [facility]

## 2024-11-19 NOTE — ASSESSMENT & PLAN NOTE
Anemia is likely due to Iron deficiency. Most recent hemoglobin and hematocrit are listed below.  Recent Labs     11/16/24  0444 11/17/24  0836 11/18/24  0233   HGB 9.2* 8.7* 8.0*   HCT 27.7* 26.9* 24.3*       Plan  - Monitor serial CBC: Daily  - Transfuse PRBC if patient becomes hemodynamically unstable, symptomatic or H/H drops below 7/21.  - Patient has not received any PRBC transfusions to date  - Patient's anemia is currently stable

## 2024-11-19 NOTE — NURSING
1936  Assessment completed, see flowsheets.  Pt is oriented to everything except day of week but easily reoriented to that.  Denies pain, nausea, and/or SOB.  Intermittent dry/ervin cough, urine dark/bloody (MD pisano).  Discussed POC for this shift with pt and daughter.  Informed them of need for sputum sample if cough becomes productive (collection cup left at bedside).  Pt and daughter verbalize understanding/agreement with POC.  No current needs voiced.  Call light within reach, will continue to monitor.     0208  Up to BSC and back to bed using walker and assist x2.  Had a large soft brown BM and voided some bloody urine while on BSC.  Olga Lidia care provided and pt assisted back to bed.  New catheter applied-slight redness noted (ensured suction not set too high)-and CPAP reapplied.  Pt and daughter deny further needs at this time, will continue to monitor.     0639  Sleeping quietly, back on oxygen/off CPAP/BiPAP, daughter resting at bedside.  Previous assessment appears unchanged.  Will continue to monitor and report to oncoming shift,

## 2024-11-19 NOTE — PROGRESS NOTES
[facility] ([place] [id number])  [facility] Medicine  Progress Note    Patient Name: Adriana Strong  MRN: [id number]  Patient Class: IP- Inpatient   Admission Date: 11/10/2024  Length of Stay: 8 days  Attending Physician: Ellie Lincoln MD  Primary Care Provider: Krzysztof Mcgraw MD        Subjective:     Principal Problem:Acute on chronic diastolic congestive heart failure        HPI:  Adriana Strong is a 79 y.o.F with hx of diastolic HF, Afib on elqiuis, s/p pacemaker, HLD, HTN, non obstructive CAD, s/p TAVR, asthma, COPD, on home oxygen (2L via [place]) [place] [facility] [place] of worsening shortness of breath, weight gain. Patient reports SOB at rest and with exertion. States she saw cardiology last week where labs were performed. She was notified that her sodium was low and MD requested that patient hold her lasix x3d, continue spironolactone and after the 3d start lasix 40mg daily and additional prn. Otherwise, patient reports compliance with medications, fluid restriction, Na restriction. Also endorsing dysuria for the last couple of days as well as decreased oral intake. Denies fever, chills, chest pain, palpitations, abdominal pain, n/v/d, headaches, or any other symptoms at this time.     In ED: AF, hypertensive, tachypneic on 2-4L via NC. CBC with chronic, stable anemia. Na 128, K 5.6, otherwise CMP unremarkable. Mag 1.5. . Trop negative. TSH 2.559. POC lactate 1.96. UA with 3+ protein, 1+ leukocyte esterase, 17 WBCs, many bacteria, 23 squams. Culture pending. POC US in ED with trace pericardial effusion. CXR with left pleural effusion and congestive change/edema, developing left lower lung zone consolidation not excluded in this hypoventilatory exam. S/p lasix 80mg inj x2 in ED. Admitted to  for further evaluation    Overview/Hospital Course:  S/p MICU admission for rescue BiPAP, diuresis, and UTI/CAP treatment weaned back to 2L prior to step down. Physical exam without obvious  hypervolemia, but pt demonstrated JVD and Echo 11/14 with CVP 15. Continued to use BiPAP QHS and with long naps. Does not have a machine at home, but could benefit from OP Sleep Medicine evaluation. PT/OT evaluation recs for moderate-intensity therapy, but family reports significant family support and motivation to do therapy at home. Continues to be hypervolemic, continuing IV lasix.    Interval History: NAEON. Hypervolemic on exam today. IV lasix restarted. Urine in canister dark/bloody. Repeat UA ordered, with lots of RBCs and WBCs.    Review of Systems   Constitutional:  Negative for activity change, appetite change and fever.   Respiratory:  Negative for cough, shortness of breath and wheezing.    Cardiovascular:  Negative for chest pain and leg swelling.   Gastrointestinal:  Positive for nausea. Negative for abdominal pain, constipation, diarrhea and vomiting.   Skin:  Negative for rash.   Neurological:  Negative for headaches.     Objective:     Vital Signs (Most Recent):  Temp: 98.3 °F (36.8 °C) (11/18/24 1936)  Pulse: 61 (11/18/24 2204)  Resp: (!) 23 (11/18/24 2204)  BP: 120/66 (11/18/24 1936)  SpO2: 99 % (11/18/24 2204) Vital Signs (24h Range):  Temp:  [98.2 °F (36.8 °C)-99 °F (37.2 °C)] 98.3 °F (36.8 °C)  Pulse:  [59-64] 61  Resp:  [17-23] 23  SpO2:  [95 %-100 %] 99 %  BP: ()/(47-66) 120/66     Weight: 94.5 kg (208 lb 5.4 oz)  Body mass index is 35.76 kg/m².    Intake/Output Summary (Last 24 hours) at 11/18/2024 2217  Last data filed at 11/18/2024 1845  Gross per 24 hour   Intake 495 ml   Output 970 ml   Net -475 ml         Physical Exam  Vitals and nursing note reviewed.   HENT:      Head: Normocephalic and atraumatic.   Eyes:      Extraocular Movements: Extraocular movements intact.      Conjunctiva/sclera: Conjunctivae normal.   Neck:      Comments: JVD to mid-neck  Cardiovascular:      Rate and Rhythm: Normal rate and regular rhythm.      Pulses: Normal pulses.   Pulmonary:      Effort:  Pulmonary effort is normal. No respiratory distress.      Breath sounds: Rales (Diffusely throughout) present. No wheezing.   Abdominal:      Palpations: Abdomen is soft.      Tenderness: There is abdominal tenderness (epigastric to deep palpation). There is no guarding.   Musculoskeletal:      Right lower leg: No edema.      Left lower leg: No edema.   Skin:     General: Skin is warm and dry.   Neurological:      General: No focal deficit present.      Mental Status: She is alert and oriented to person, place, and time.   Psychiatric:         Mood and Affect: Mood normal.             Significant Labs: All pertinent labs within the past 24 hours have been reviewed.    Significant Imaging: I have reviewed all pertinent imaging results/findings within the past 24 hours.    Assessment/Plan:      * Acute on chronic diastolic congestive heart failure  Patient has Diastolic (HFpEF) heart failure that is Acute on chronic. On presentation their CHF was decompensated. Evidence of decompensated CHF on presentation includes: edema, elevated JVD, crackles on lung auscultation, weight gain, orthopnea, dyspnea on exertion (WHITLEY), and shortness of breath. The etiology of their decompensation is likely medication adjustments . Patient is on the HF pathway. Most recent BNP and echo results are listed below.  Recent Labs     11/18/24  1125   BNP 1,194*       Latest ECHO  Results for orders placed during the hospital encounter of 09/24/24    Echo    Interpretation Summary    Left Ventricle: The left ventricle is normal in size. Normal wall thickness. There is concentric remodeling. There is normal systolic function with a visually estimated ejection fraction of 60 - 65%. Grade III diastolic dysfunction. Elevated left ventricular filling pressure.    Right Ventricle: Normal right ventricular cavity size. Wall thickness is normal. Systolic function is normal. Pacemaker lead present in the ventricle.    The left atrium is severely  dilated.    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 It is reported to be a 26mm Phan Suzi S3 valve. Aortic valve area by VTI is 1.90 cm2. Aortic valve peak velocity is 1.64 m/s. Mean gradient is 6 mmHg. The dimensionless index is 0.57. There is mild aortic regurgitation.    Tricuspid Valve: There is mild regurgitation.    Pulmonary Artery: The estimated pulmonary artery systolic pressure is 48 mmHg.    IVC/SVC: Elevated venous pressure at 15 mmHg.    Pericardium: There is a small posterior effusion. No indication of cardiac tamponade.    Current Heart Failure Medications  spironolactone tablet 25 mg, Daily, Oral  hydrALAZINE injection 10 mg, Every 6 hours PRN, Intravenous  carvediloL tablet 12.5 mg, 2 times daily with meals, Oral  losartan tablet 50 mg, Daily, Oral  furosemide injection 80 mg, Every 12 hours, Intravenous    Plan  - Place on telemetry  - Place on fluid restriction of 1.5 L.   - Cardiology has not been consulted  - The patient's volume status is stable but not at their baseline as indicated by edema, elevated JVD, crackles on lung auscultation, weight gain, dyspnea on exertion (WHITLEY), and shortness of breath  - Restarting IV lasix, patient hypervolemic on exam today  - strict intake and output, daily standing weights    Intake/Output Summary (Last 24 hours) at 11/18/2024 2222  Last data filed at 11/18/2024 1845  Gross per 24 hour   Intake 495 ml   Output 970 ml   Net -475 ml             Irritant contact dermatitis due to friction or contact with body fluids, unspecified  Sacral wound with appreciated recs by Wound Care      Anxiety  - home Lorazepam      Pleural effusion  Patient found to have small pleural effusion on imaging. I have personally reviewed and interpreted the following imaging: CT. A thoracentesis was performed. Pleural fluid was sent for analysis. Labs reviewed, including  . Fluid most consistent with Transudate.  . Most likely etiology  includes Congestive Heart Failure. Management to include Diuresis      Hypomagnesemia  Patient has Abnormal Magnesium: hypomagnesemia. Will continue to monitor electrolytes closely. Will replace the affected electrolytes and repeat labs to be done after interventions completed. The patient's magnesium results have been reviewed and are listed below.  Recent Labs   Lab 11/10/24  1200   MG 1.5*        Hyperkalemia  The patients most recent potassium results are listed below.  Recent Labs     11/08/24  1015 11/10/24  1200   K 4.7 5.6*     Plan  - Monitor for arrhythmias with EKG and/or continuous telemetry.   - Treat the hyperkalemia with Potassium Binders.   - Monitor potassium: Daily  - The patient's hyperkalemia is stable    Anemia  Anemia is likely due to Iron deficiency. Most recent hemoglobin and hematocrit are listed below.  Recent Labs     11/16/24  0444 11/17/24  0836 11/18/24  0233   HGB 9.2* 8.7* 8.0*   HCT 27.7* 26.9* 24.3*       Plan  - Monitor serial CBC: Daily  - Transfuse PRBC if patient becomes hemodynamically unstable, symptomatic or H/H drops below 7/21.  - Patient has not received any PRBC transfusions to date  - Patient's anemia is currently stable    Acute on chronic respiratory failure with hypoxia  Patient with Hypoxic Respiratory failure which is Acute on chronic.  she is on home oxygen at 2 LPM. Supplemental oxygen was provided and noted- Oxygen Concentration (%):  [30] 30    .   Signs/symptoms of respiratory failure include- tachypnea and increased work of breathing. Contributing diagnoses includes - CHF Labs and images were reviewed. Patient Has not had a recent ABG. Will treat underlying causes and adjust management of respiratory failure as follows- diuresis    - Discussed with family that goal O2 should be 88-92% given COPD. They will continue to titrate at home with pulse ox.     CAD (coronary artery disease)  - history noted   - continue statin, plavix    Acute cystitis without  hematuria  - patient with reports of dysuria and dark urine noted at bedside  - UA infectious appearing, but with 23 squams  - urine culture with E Fecalis, treated for 5 day course with Zosyn      Hyponatremia  Hyponatremia is likely due to Heart Failure. The patient's most recent sodium results are listed below.  Recent Labs     11/17/24  0041 11/17/24  0836 11/18/24  0233   * 133*  133*  133*  132*  132* 131*       Plan  - Correct the sodium by 4-6mEq in 24 hours.   - Obtain the following studies: Urine sodium, urine osmolality, serum osmolality or TSH, T4.  - Will treat the hyponatremia with diuresis  - Monitor sodium Daily.   - Patient hyponatremia is stable    COPD (chronic obstructive pulmonary disease)  Patient's COPD is controlled currently.  Patient is currently off COPD Pathway. Continue scheduled inhalers Supplemental oxygen and monitor respiratory status closely.     - prn duonebs  - titrate O2 to 88-92%, discussed with family    Aortic stenosis  Echocardiogram with evidence of aortic stenosis.The patient's most recent echocardiogram result is listed below. We will manage the valvular abnormality by monitoring.     Echo    Result Date: 11/14/2024    Left Ventricle: The left ventricle is normal in size. Normal wall   thickness. There is normal systolic function with a visually estimated   ejection fraction of 60 - 65%. Ejection fraction is approximately 65%.    Right Ventricle: Normal right ventricular cavity size. Wall thickness   is normal. Systolic function is normal.    Left Atrium: Left atrium is moderately dilated.    Right Atrium: Right atrium is moderately dilated.    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 It is reported to be a 26 mm Phan Suzi valve. Mildly   calcified cusps. Aortic valve area by VTI is 0.8 cm2. Aortic valve peak   velocity is 2.9 m/s. Mean gradient is 21.8 mmHg. The dimensionless index   is 0.25.  msand AcT/ET  ratio is 0.36. Findings and trend from prior   echocardiography evaluation is suggestive of prosthetic valve stenosis.   Clinical correlation is required.    Mitral Valve: There is moderate mitral annular calcification present.    Tricuspid Valve: PISA radius is 0.6cm and Vena Contracta is 0.7cm   consistent with moderate to severe regurgitation.    Pulmonary Artery: There is moderate to severe pulmonary hypertension.   The estimated pulmonary artery systolic pressure is 62 mmHg.    IVC/SVC: Elevated venous pressure at 15 mmHg.    Pericardium: There is a trivial circumferential effusion.        Echo    Result Date: 11/11/2024    Left Ventricle: The left ventricle is normal in size. Normal wall   thickness. There is normal systolic function. Ejection fraction is   approximately 65%. There is normal diastolic function.    Right Ventricle: Normal right ventricular cavity size. Wall thickness   is normal. Systolic function is normal. Pacemaker lead present in the   ventricle.    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 It is reported to be a 26 mm Phan valve. The leaflets appear   mildly thickened. Aortic valve area by VTI is 0.7 cm2. LVOT diameter is   1.9 cm. Aortic valve peak velocity is 3.3 m/s. Mean gradient is 33.3 mmHg.   The dimensionless index is 0.26. Mild paravalvular regurgitation.    Mitral Valve: There is moderate mitral annular calcification present.    Tricuspid Valve: There is moderate regurgitation.    Pulmonary Artery: There is pulmonary hypertension. The estimated   pulmonary artery systolic pressure is 62 mmHg.    IVC/SVC: Intermediate venous pressure at 8 mmHg.        Echo    Result Date: 9/25/2024    Left Ventricle: The left ventricle is normal in size. Normal wall   thickness. There is concentric remodeling. There is normal systolic   function with a visually estimated ejection fraction of 60 - 65%. Grade   III diastolic dysfunction. Elevated left  ventricular filling pressure.    Right Ventricle: Normal right ventricular cavity size. Wall thickness   is normal. Systolic function is normal. Pacemaker lead present in the   ventricle.    The left atrium is severely dilated.    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 It is reported to be a 26mm Phan Suzi S3 valve. Aortic   valve area by VTI is 1.90 cm2. Aortic valve peak velocity is 1.64 m/s.   Mean gradient is 6 mmHg. The dimensionless index is 0.57. There is mild   aortic regurgitation.    Tricuspid Valve: There is mild regurgitation.    Pulmonary Artery: The estimated pulmonary artery systolic pressure is   48 mmHg.    IVC/SVC: Elevated venous pressure at 15 mmHg.    Pericardium: There is a small posterior effusion. No indication of   cardiac tamponade.        Echo    Result Date: 2/10/2024    Left Ventricle: The left ventricle is normal in size. Normal wall   thickness. There is concentric remodeling. Regional wall motion   abnormalities present. There is moderately reduced systolic function with   a visually estimated ejection fraction of 35 - 40%. Ejection fraction by   visual approximation is 35%. There is normal diastolic function.    Right Ventricle: Normal right ventricular cavity size. Wall thickness   is normal. Right ventricle wall motion  is normal. Systolic function is   normal. Pacemaker lead present in the ventricle.    Left Atrium: Left atrium is moderately dilated.    Right Atrium: Right atrium is mildly dilated. Lead present in the right   atrium.    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 Aortic valve area by VTI is 1.03 cm². Aortic valve peak velocity   is 2.07 m/s. Mean gradient is 11 mmHg. The dimensionless index is 0.30.   There is trace aortic regurgitation.    Mitral Valve: There is mild mitral annular calcification present.    Tricuspid Valve: There is mild regurgitation.    Pulmonary Artery:  There is mild to moderate pulmonary hypertension. The   estimated pulmonary artery systolic pressure is 47 mmHg.    IVC/SVC: Normal venous pressure at 3 mmHg.        - Continue judicious diuresis given forward-flow dependence for aortic valve stenosis  - Will need OP follow up after hospitalization to discuss post-TAVR aortic valve stenosis    Permanent atrial fibrillation  Patient has permanent atrial fibrillation. Patient is currently in sinus rhythm. [id number] Score: 5. The patients heart rate in the last 24 hours is as follows:  Pulse  Min: 59  Max: 65     Antiarrhythmics       Anticoagulants  apixaban tablet 5 mg, 2 times daily, Oral    Plan  - Replete lytes with a goal of K>4, Mg >2  - Patient is anticoagulated, see medications listed above.  - Patient's afib is currently controlled        Essential hypertension  Patients blood pressure range in the last 24 hours was: BP  Min: 95/46  Max: 221/86.The patient's inpatient anti-hypertensive regimen is listed below:  Current Antihypertensives  isosorbide mononitrate 24 hr tablet 60 mg, Daily, Oral  ranolazine 12 hr tablet 1,000 mg, 2 times daily, Oral  spironolactone tablet 25 mg, Daily, Oral  hydrALAZINE injection 10 mg, Every 6 hours PRN, Intravenous  carvediloL tablet 12.5 mg, 2 times daily with meals, Oral  losartan tablet 50 mg, Daily, Oral  furosemide tablet 80 mg, 2 times daily, Oral    Plan  - BP is uncontrolled, will adjust as follows: can consider increasing carvedilol if HR can tolerate   - states was previously on losartan, but recently discontinued. Consider resuming if clinically necessary.    Gastroesophageal reflux disease  - continue home PPI      HLD (hyperlipidemia)  - continue home statin      Type 2 diabetes mellitus  Patient's FSGs are controlled on current medication regimen.  Last A1c reviewed-   Lab Results   Component Value Date    HGBA1C 5.2 11/10/2024     Most recent fingerstick glucose reviewed-   Recent Labs   Lab 11/18/24  3533  11/18/24  1558 11/18/24 2051   POCTGLUCOSE 113* 185* 129*       Current correctional scale  Low  Maintain anti-hyperglycemic dose as follows-   Antihyperglycemics (From admission, onward)      Start     Stop Route Frequency Ordered    11/10/24 1513  insulin aspart U-100 pen 0-5 Units         -- SubQ Before meals & nightly PRN 11/10/24 1414          Hold Oral hypoglycemics while patient is in the hospital.      VTE Risk Mitigation (From admission, onward)           Ordered     apixaban tablet 5 mg  2 times daily         11/11/24 1535     IP VTE HIGH RISK PATIENT  Once         11/10/24 1414     Place sequential compression device  Until discontinued         11/10/24 1414     Reason for No Pharmacological VTE Prophylaxis  Once        Question:  Reasons:  Answer:  Already adequately anticoagulated on oral Anticoagulants    11/10/24 1414     Place sequential compression device  Until discontinued         11/10/24 1414     Place sequential compression device  Until discontinued         11/10/24 1414                    Discharge Planning   MIMI: 11/18/2024     Code Status: Full Code   Is the patient medically ready for discharge?:     Reason for patient still in hospital: treatment  Discharge Plan A: Home Health   Discharge Delays: None known at this time      Ellie Lincoln MD  Department of Hospital Medicine   [facility] ([place])

## 2024-11-19 NOTE — ASSESSMENT & PLAN NOTE
Patient's FSGs are controlled on current medication regimen.  Last A1c reviewed-   Lab Results   Component Value Date    HGBA1C 5.2 11/10/2024     Most recent fingerstick glucose reviewed-   Recent Labs   Lab 11/18/24  0744 11/18/24  1558 11/18/24 2051   POCTGLUCOSE 113* 185* 129*       Current correctional scale  Low  Maintain anti-hyperglycemic dose as follows-   Antihyperglycemics (From admission, onward)    Start     Stop Route Frequency Ordered    11/10/24 1513  insulin aspart U-100 pen 0-5 Units         -- SubQ Before meals & nightly PRN 11/10/24 1414        Hold Oral hypoglycemics while patient is in the hospital.

## 2024-11-19 NOTE — PT/OT/SLP PROGRESS
Occupational Therapy   Treatment    Name: Adriana Strong  MRN: [id number]  Admitting Diagnosis:  Acute on chronic diastolic congestive heart failure       Recommendations:     Discharge Recommendations: Moderate Intensity Therapy  Discharge Equipment Recommendations:  to be determined by next level of care  Barriers to discharge:  None    Assessment:     Adriana Strong is a 79 y.o. female with a medical diagnosis of Acute on chronic diastolic congestive heart failure.  She presents with c/o BLE weakness but able to walk a few feet within the room. Performance deficits affecting function are weakness, impaired endurance, decreased coordination, impaired self care skills, decreased lower extremity function, impaired functional mobility, decreased upper extremity function, decreased ROM, gait instability, decreased safety awareness, impaired balance.     Rehab Prognosis:  Good; patient would benefit from acute skilled OT services to address these deficits and reach maximum level of function.       Plan:     Patient to be seen 4 x/week to address the above listed problems via self-care/home management, therapeutic activities, therapeutic exercises  Plan of Care Expires: 12/14/24  Plan of Care Reviewed with: patient, spouse    Subjective     Pain/Comfort:  Pain Rating 1: 0/10    Objective:     Communicated with: rn prior to session.  Patient found supine with oxygen, telemetry upon OT entry to room.    General Precautions: Standard, fall    Orthopedic Precautions:N/A  Braces: N/A  Respiratory Status: Nasal cannula, flow 2 L/min     Occupational Performance:     Bed Mobility:    Patient completed Scooting/Bridging with stand by assistance  Patient completed Supine to Sit with stand by assistance     Functional Mobility/Transfers:  Patient completed Sit <> Stand Transfer with minimum assistance  with  rolling walker   Patient completed Bed <> Chair Transfer using Step Transfer technique with contact guard assistance with  rolling walker  Functional Mobility: Pt walked 6' then 4' more after a sit break CGA with a RW.    Activities of Daily Living:  Pt declined.    [facility] 6 Click ADL: 15    Treatment & Education:  Pt educated on UE/LE ROM to do daily outside of therapy sessions to facilitate functional progression. Demonstrated motions then had pt do return-demonstration.  Discussed OT POC.    Patient left up in chair with all lines intact and call button in reach    GOALS:   Multidisciplinary Problems       Occupational Therapy Goals          Problem: Occupational Therapy    Goal Priority Disciplines Outcome Interventions   Occupational Therapy Goal     OT, PT/OT Progressing    Description: Goals to be met by: 12/14/24 (1 mo)     Patient will increase functional independence with ADLs by performing:    UE Dressing with [place].  LE Dressing with [place].  Grooming while standing at sink with [place].  Toileting from toilet with [place] for hygiene and clothing management.   Rolling to Bilateral with [place].   Supine to sit with [place].  Step transfer with [place]  Toilet transfer to toilet with [place].                         Time Tracking:     OT Date of Treatment: 11/19/24  OT Start Time: 1110  OT Stop Time: 1140  OT Total Time (min): 30 min    Billable Minutes:Therapeutic Activity 20  Therapeutic Exercise 10    OT/MELISSA: OT          11/19/2024

## 2024-11-19 NOTE — PLAN OF CARE
Problem: Adult Inpatient Plan of Care  Goal: Plan of Care Review  Outcome: Progressing  Flowsheets (Taken 11/18/2024 1936)  Plan of Care Reviewed With:   patient   family  Goal: Optimal Comfort and Wellbeing  Outcome: Progressing     Problem: Acute Kidney Injury/Impairment  Goal: Fluid and Electrolyte Balance  Outcome: Progressing  1.5L/day fluid restriction  Strict intake/output and daily weights monitored     Problem: Wound  Goal: Improved Oral Intake  Outcome: Progressing  Intervention: Promote and Optimize Oral Intake  Flowsheets (Taken 11/18/2024 1936)  Nutrition Interventions:   frequent small meals provided   meal set-up provided   meals from home/family encouraged   food preferences provided

## 2024-11-19 NOTE — ASSESSMENT & PLAN NOTE
Hyponatremia is likely due to Heart Failure. The patient's most recent sodium results are listed below.  Recent Labs     11/17/24  0041 11/17/24  0836 11/18/24  0233   * 133*  133*  133*  132*  132* 131*       Plan  - Correct the sodium by 4-6mEq in 24 hours.   - Obtain the following studies: Urine sodium, urine osmolality, serum osmolality or TSH, T4.  - Will treat the hyponatremia with diuresis  - Monitor sodium Daily.   - Patient hyponatremia is stable

## 2024-11-19 NOTE — ASSESSMENT & PLAN NOTE
Patient has Diastolic (HFpEF) heart failure that is Acute on chronic. On presentation their CHF was decompensated. Evidence of decompensated CHF on presentation includes: edema, elevated JVD, crackles on lung auscultation, weight gain, orthopnea, dyspnea on exertion (WHITLEY), and shortness of breath. The etiology of their decompensation is likely medication adjustments . Patient is on the HF pathway. Most recent BNP and echo results are listed below.  Recent Labs     11/18/24  1125   BNP 1,194*       Latest ECHO  Results for orders placed during the hospital encounter of 09/24/24    Echo    Interpretation Summary    Left Ventricle: The left ventricle is normal in size. Normal wall thickness. There is concentric remodeling. There is normal systolic function with a visually estimated ejection fraction of 60 - 65%. Grade III diastolic dysfunction. Elevated left ventricular filling pressure.    Right Ventricle: Normal right ventricular cavity size. Wall thickness is normal. Systolic function is normal. Pacemaker lead present in the ventricle.    The left atrium is severely dilated.    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 It is reported to be a 26mm Phan Suzi S3 valve. Aortic valve area by VTI is 1.90 cm2. Aortic valve peak velocity is 1.64 m/s. Mean gradient is 6 mmHg. The dimensionless index is 0.57. There is mild aortic regurgitation.    Tricuspid Valve: There is mild regurgitation.    Pulmonary Artery: The estimated pulmonary artery systolic pressure is 48 mmHg.    IVC/SVC: Elevated venous pressure at 15 mmHg.    Pericardium: There is a small posterior effusion. No indication of cardiac tamponade.    Current Heart Failure Medications  spironolactone tablet 25 mg, Daily, Oral  hydrALAZINE injection 10 mg, Every 6 hours PRN, Intravenous  carvediloL tablet 12.5 mg, 2 times daily with meals, Oral  losartan tablet 50 mg, Daily, Oral  furosemide injection 80 mg, Every 12 hours,  Intravenous    Plan  - Place on telemetry  - Place on fluid restriction of 1.5 L.   - Cardiology has not been consulted  - The patient's volume status is stable but not at their baseline as indicated by edema, elevated JVD, crackles on lung auscultation, weight gain, dyspnea on exertion (WHITLEY), and shortness of breath  - Restarting IV lasix, patient hypervolemic on exam today  - strict intake and output, daily standing weights    Intake/Output Summary (Last 24 hours) at 11/18/2024 2222  Last data filed at 11/18/2024 1845  Gross per 24 hour   Intake 495 ml   Output 970 ml   Net -475 ml

## 2024-11-19 NOTE — SUBJECTIVE & OBJECTIVE
Interval History: NAEON. Hypervolemic on exam today. IV lasix restarted. Urine in canister dark/bloody. Repeat UA ordered, with lots of RBCs and WBCs.    Review of Systems   Constitutional:  Negative for activity change, appetite change and fever.   Respiratory:  Negative for cough, shortness of breath and wheezing.    Cardiovascular:  Negative for chest pain and leg swelling.   Gastrointestinal:  Positive for nausea. Negative for abdominal pain, constipation, diarrhea and vomiting.   Skin:  Negative for rash.   Neurological:  Negative for headaches.     Objective:     Vital Signs (Most Recent):  Temp: 98.3 °F (36.8 °C) (11/18/24 1936)  Pulse: 61 (11/18/24 2204)  Resp: (!) 23 (11/18/24 2204)  BP: 120/66 (11/18/24 1936)  SpO2: 99 % (11/18/24 2204) Vital Signs (24h Range):  Temp:  [98.2 °F (36.8 °C)-99 °F (37.2 °C)] 98.3 °F (36.8 °C)  Pulse:  [59-64] 61  Resp:  [17-23] 23  SpO2:  [95 %-100 %] 99 %  BP: ()/(47-66) 120/66     Weight: 94.5 kg (208 lb 5.4 oz)  Body mass index is 35.76 kg/m².    Intake/Output Summary (Last 24 hours) at 11/18/2024 2217  Last data filed at 11/18/2024 1845  Gross per 24 hour   Intake 495 ml   Output 970 ml   Net -475 ml         Physical Exam  Vitals and nursing note reviewed.   HENT:      Head: Normocephalic and atraumatic.   Eyes:      Extraocular Movements: Extraocular movements intact.      Conjunctiva/sclera: Conjunctivae normal.   Neck:      Comments: JVD to mid-neck  Cardiovascular:      Rate and Rhythm: Normal rate and regular rhythm.      Pulses: Normal pulses.   Pulmonary:      Effort: Pulmonary effort is normal. No respiratory distress.      Breath sounds: Rales (Diffusely throughout) present. No wheezing.   Abdominal:      Palpations: Abdomen is soft.      Tenderness: There is abdominal tenderness (epigastric to deep palpation). There is no guarding.   Musculoskeletal:      Right lower leg: No edema.      Left lower leg: No edema.   Skin:     General: Skin is warm and dry.    Neurological:      General: No focal deficit present.      Mental Status: She is alert and oriented to person, place, and time.   Psychiatric:         Mood and Affect: Mood normal.             Significant Labs: All pertinent labs within the past 24 hours have been reviewed.    Significant Imaging: I have reviewed all pertinent imaging results/findings within the past 24 hours.

## 2024-11-20 LAB
ALBUMIN SERPL BCP-MCNC: 2.4 G/DL (ref 3.5–5.2)
ALP SERPL-CCNC: 37 U/L (ref 40–150)
ALT SERPL W/O P-5'-P-CCNC: 13 U/L (ref 10–44)
ANION GAP SERPL CALC-SCNC: 9 MMOL/L (ref 8–16)
AST SERPL-CCNC: 17 U/L (ref 10–40)
BASOPHILS # BLD AUTO: 0.06 K/UL (ref 0–0.2)
BASOPHILS NFR BLD: 0.8 % (ref 0–1.9)
BILIRUB SERPL-MCNC: 0.4 MG/DL (ref 0.1–1)
BUN SERPL-MCNC: 28 MG/DL (ref 8–23)
CALCIUM SERPL-MCNC: 9 MG/DL (ref 8.7–10.5)
CHLORIDE SERPL-SCNC: 87 MMOL/L (ref 95–110)
CO2 SERPL-SCNC: 35 MMOL/L (ref 23–29)
CREAT SERPL-MCNC: 0.8 MG/DL (ref 0.5–1.4)
DIFFERENTIAL METHOD BLD: ABNORMAL
EOSINOPHIL # BLD AUTO: 0 K/UL (ref 0–0.5)
EOSINOPHIL NFR BLD: 0.4 % (ref 0–8)
ERYTHROCYTE [DISTWIDTH] IN BLOOD BY AUTOMATED COUNT: 17.8 % (ref 11.5–14.5)
EST. GFR  (NO RACE VARIABLE): >60 ML/MIN/1.73 M^2
GLUCOSE SERPL-MCNC: 101 MG/DL (ref 70–110)
HCT VFR BLD AUTO: 26 % (ref 37–48.5)
HGB BLD-MCNC: 8.7 G/DL (ref 12–16)
IMM GRANULOCYTES # BLD AUTO: 0.04 K/UL (ref 0–0.04)
IMM GRANULOCYTES NFR BLD AUTO: 0.5 % (ref 0–0.5)
LYMPHOCYTES # BLD AUTO: 0.8 K/UL (ref 1–4.8)
LYMPHOCYTES NFR BLD: 11.4 % (ref 18–48)
MAGNESIUM SERPL-MCNC: 1.6 MG/DL (ref 1.6–2.6)
MCH RBC QN AUTO: 27.4 PG (ref 27–31)
MCHC RBC AUTO-ENTMCNC: 33.5 G/DL (ref 32–36)
MCV RBC AUTO: 82 FL (ref 82–98)
MONOCYTES # BLD AUTO: 1 K/UL (ref 0.3–1)
MONOCYTES NFR BLD: 14 % (ref 4–15)
NEUTROPHILS # BLD AUTO: 5.3 K/UL (ref 1.8–7.7)
NEUTROPHILS NFR BLD: 72.9 % (ref 38–73)
NRBC BLD-RTO: 0 /100 WBC
PHOSPHATE SERPL-MCNC: 3 MG/DL (ref 2.7–4.5)
PLATELET # BLD AUTO: 298 K/UL (ref 150–450)
PMV BLD AUTO: 9.6 FL (ref 9.2–12.9)
POCT GLUCOSE: 111 MG/DL (ref 70–110)
POCT GLUCOSE: 113 MG/DL (ref 70–110)
POCT GLUCOSE: 149 MG/DL (ref 70–110)
POCT GLUCOSE: 160 MG/DL (ref 70–110)
POTASSIUM SERPL-SCNC: 4.6 MMOL/L (ref 3.5–5.1)
PROT SERPL-MCNC: 6.2 G/DL (ref 6–8.4)
RBC # BLD AUTO: 3.18 M/UL (ref 4–5.4)
SODIUM SERPL-SCNC: 131 MMOL/L (ref 136–145)
WBC # BLD AUTO: 7.28 K/UL (ref 3.9–12.7)

## 2024-11-20 PROCEDURE — 99900035 HC TECH TIME PER 15 MIN (STAT)

## 2024-11-20 PROCEDURE — 25000003 PHARM REV CODE 250

## 2024-11-20 PROCEDURE — 36415 COLL VENOUS BLD VENIPUNCTURE: CPT

## 2024-11-20 PROCEDURE — 25000003 PHARM REV CODE 250: Performed by: STUDENT IN AN ORGANIZED HEALTH CARE EDUCATION/TRAINING PROGRAM

## 2024-11-20 PROCEDURE — 25000003 PHARM REV CODE 250: Performed by: INTERNAL MEDICINE

## 2024-11-20 PROCEDURE — 27000221 HC OXYGEN, UP TO 24 HOURS

## 2024-11-20 PROCEDURE — 94660 CPAP INITIATION&MGMT: CPT

## 2024-11-20 PROCEDURE — 94761 N-INVAS EAR/PLS OXIMETRY MLT: CPT

## 2024-11-20 PROCEDURE — 83735 ASSAY OF MAGNESIUM: CPT

## 2024-11-20 PROCEDURE — 84100 ASSAY OF PHOSPHORUS: CPT

## 2024-11-20 PROCEDURE — 25000003 PHARM REV CODE 250: Performed by: EMERGENCY MEDICINE

## 2024-11-20 PROCEDURE — 80053 COMPREHEN METABOLIC PANEL: CPT

## 2024-11-20 PROCEDURE — 85025 COMPLETE CBC W/AUTO DIFF WBC: CPT

## 2024-11-20 PROCEDURE — 21400001 HC TELEMETRY ROOM

## 2024-11-20 PROCEDURE — 63600175 PHARM REV CODE 636 W HCPCS

## 2024-11-20 RX ORDER — MAGNESIUM SULFATE HEPTAHYDRATE 40 MG/ML
2 INJECTION, SOLUTION INTRAVENOUS ONCE
Status: COMPLETED | OUTPATIENT
Start: 2024-11-20 | End: 2024-11-20

## 2024-11-20 RX ADMIN — OXYCODONE HYDROCHLORIDE AND ACETAMINOPHEN 1000 MG: 500 TABLET ORAL at 08:11

## 2024-11-20 RX ADMIN — CARVEDILOL 12.5 MG: 12.5 TABLET, FILM COATED ORAL at 08:11

## 2024-11-20 RX ADMIN — RANOLAZINE 1000 MG: 500 TABLET, EXTENDED RELEASE ORAL at 08:11

## 2024-11-20 RX ADMIN — LORAZEPAM 0.5 MG: 0.5 TABLET ORAL at 09:11

## 2024-11-20 RX ADMIN — CLOPIDOGREL BISULFATE 75 MG: 75 TABLET ORAL at 08:11

## 2024-11-20 RX ADMIN — APIXABAN 5 MG: 5 TABLET, FILM COATED ORAL at 08:11

## 2024-11-20 RX ADMIN — Medication 6 MG: at 09:11

## 2024-11-20 RX ADMIN — Medication: at 09:11

## 2024-11-20 RX ADMIN — Medication: at 08:11

## 2024-11-20 RX ADMIN — GABAPENTIN 300 MG: 300 CAPSULE ORAL at 09:11

## 2024-11-20 RX ADMIN — FUROSEMIDE 80 MG: 10 INJECTION, SOLUTION INTRAVENOUS at 09:11

## 2024-11-20 RX ADMIN — DULOXETINE HYDROCHLORIDE 20 MG: 20 CAPSULE, DELAYED RELEASE ORAL at 08:11

## 2024-11-20 RX ADMIN — LOSARTAN POTASSIUM 50 MG: 50 TABLET, FILM COATED ORAL at 08:11

## 2024-11-20 RX ADMIN — ISOSORBIDE MONONITRATE 60 MG: 60 TABLET, EXTENDED RELEASE ORAL at 08:11

## 2024-11-20 RX ADMIN — MAGNESIUM SULFATE IN WATER 2 G: 40 INJECTION, SOLUTION INTRAVENOUS at 11:11

## 2024-11-20 RX ADMIN — CARVEDILOL 12.5 MG: 12.5 TABLET, FILM COATED ORAL at 05:11

## 2024-11-20 RX ADMIN — RANOLAZINE 1000 MG: 500 TABLET, EXTENDED RELEASE ORAL at 09:11

## 2024-11-20 RX ADMIN — ATORVASTATIN CALCIUM 10 MG: 10 TABLET, FILM COATED ORAL at 08:11

## 2024-11-20 RX ADMIN — FUROSEMIDE 80 MG: 10 INJECTION, SOLUTION INTRAVENOUS at 08:11

## 2024-11-20 RX ADMIN — MONTELUKAST 10 MG: 10 TABLET, FILM COATED ORAL at 08:11

## 2024-11-20 RX ADMIN — PANTOPRAZOLE SODIUM 40 MG: 40 TABLET, DELAYED RELEASE ORAL at 08:11

## 2024-11-20 RX ADMIN — APIXABAN 5 MG: 5 TABLET, FILM COATED ORAL at 09:11

## 2024-11-20 RX ADMIN — SPIRONOLACTONE 25 MG: 25 TABLET ORAL at 08:11

## 2024-11-20 NOTE — SUBJECTIVE & OBJECTIVE
Interval History: NAEON. Volume status improving although still hypervolemic. More interactive and comfortable today on exam    Review of Systems   Constitutional:  Negative for activity change, appetite change and fever.   Respiratory:  Negative for cough, shortness of breath and wheezing.    Cardiovascular:  Negative for chest pain and leg swelling.   Gastrointestinal:  Positive for nausea. Negative for abdominal pain, constipation, diarrhea and vomiting.   Skin:  Negative for rash.   Neurological:  Negative for headaches.     Objective:     Vital Signs (Most Recent):  Temp: 98.3 °F (36.8 °C) (11/19/24 1601)  Pulse: (!) 59 (11/19/24 1949)  Resp: 17 (11/19/24 1601)  BP: 135/60 (11/19/24 1735)  SpO2: 100 % (11/19/24 1949) Vital Signs (24h Range):  Temp:  [97.6 °F (36.4 °C)-98.3 °F (36.8 °C)] 98.3 °F (36.8 °C)  Pulse:  [57-61] 59  Resp:  [17-25] 17  SpO2:  [95 %-100 %] 100 %  BP: (110-169)/(54-73) 135/60     Weight: 94.5 kg (208 lb 5.4 oz)  Body mass index is 35.76 kg/m².    Intake/Output Summary (Last 24 hours) at 11/19/2024 1956  Last data filed at 11/19/2024 1826  Gross per 24 hour   Intake 776 ml   Output 950 ml   Net -174 ml         Physical Exam  Vitals and nursing note reviewed.   HENT:      Head: Normocephalic and atraumatic.   Eyes:      Extraocular Movements: Extraocular movements intact.      Conjunctiva/sclera: Conjunctivae normal.   Neck:      Comments: JVD to mid-neck  Cardiovascular:      Rate and Rhythm: Normal rate and regular rhythm.      Pulses: Normal pulses.   Pulmonary:      Effort: Pulmonary effort is normal. No respiratory distress.      Breath sounds: Rales (Diffusely throughout) present. No wheezing.   Abdominal:      Palpations: Abdomen is soft.      Tenderness: There is abdominal tenderness (epigastric to deep palpation). There is no guarding.   Musculoskeletal:      Right lower leg: No edema.      Left lower leg: No edema.   Skin:     General: Skin is warm and dry.   Neurological:       General: No focal deficit present.      Mental Status: She is alert and oriented to person, place, and time.   Psychiatric:         Mood and Affect: Mood normal.             Significant Labs: All pertinent labs within the past 24 hours have been reviewed.    Significant Imaging: I have reviewed all pertinent imaging results/findings within the past 24 hours.

## 2024-11-20 NOTE — NURSING
Pt AAO x 4, no c/o pain. VSS, paced on telemetry. O2 @ 2L with sats %. Pt up to the chair and BSC with x 1 assist and using a walker. Pt tolerated activity well. Purewick changed, BM x 3. Fluid restriction maintained. Pt turned frequently. No acute events this shift. Family supportive at the bedside. Safety maintained. Call light in reach.

## 2024-11-20 NOTE — ASSESSMENT & PLAN NOTE
Anemia is likely due to Iron deficiency. Most recent hemoglobin and hematocrit are listed below.  Recent Labs     11/17/24  0836 11/18/24  0233 11/19/24  0447   HGB 8.7* 8.0* 7.7*   HCT 26.9* 24.3* 24.2*       Plan  - Monitor serial CBC: Daily  - Transfuse PRBC if patient becomes hemodynamically unstable, symptomatic or H/H drops below 7/21.  - Patient has not received any PRBC transfusions to date  - Patient's anemia is currently stable

## 2024-11-20 NOTE — ASSESSMENT & PLAN NOTE
Patient's FSGs are controlled on current medication regimen.  Last A1c reviewed-   Lab Results   Component Value Date    HGBA1C 5.2 11/10/2024     Most recent fingerstick glucose reviewed-   Recent Labs   Lab 11/18/24  2051 11/19/24  0749 11/19/24  1201 11/19/24  1600   POCTGLUCOSE 129* 88 139* 161*       Current correctional scale  Low  Maintain anti-hyperglycemic dose as follows-   Antihyperglycemics (From admission, onward)    Start     Stop Route Frequency Ordered    11/10/24 1513  insulin aspart U-100 pen 0-5 Units         -- SubQ Before meals & nightly PRN 11/10/24 1414        Hold Oral hypoglycemics while patient is in the hospital.

## 2024-11-20 NOTE — PROGRESS NOTES
[facility] ([place] [id number])  [facility] Medicine  Progress Note    Patient Name: Adriana Strong  MRN: [id number]  Patient Class: IP- Inpatient   Admission Date: 11/10/2024  Length of Stay: 9 days  Attending Physician: Ellie Lincoln MD  Primary Care Provider: Krzysztof Mcgraw MD        Subjective:     Principal Problem:Acute on chronic diastolic congestive heart failure        HPI:  Adriana Strong is a 79 y.o.F with hx of diastolic HF, Afib on elqiuis, s/p pacemaker, HLD, HTN, non obstructive CAD, s/p TAVR, asthma, COPD, on home oxygen (2L via NC) who presents to [facility] ED for complaints of worsening shortness of breath, weight gain. Patient reports SOB at rest and with exertion. States she saw cardiology last week where labs were performed. She was notified that her sodium was low and MD requested that patient hold her lasix x3d, continue spironolactone and after the 3d start lasix 40mg daily and additional prn. Otherwise, patient reports compliance with medications, fluid restriction, Na restriction. Also endorsing dysuria for the last couple of days as well as decreased oral intake. Denies fever, chills, chest pain, palpitations, abdominal pain, n/v/d, headaches, or any other symptoms at this time.     In ED: AF, hypertensive, tachypneic on 2-4L via NC. CBC with chronic, stable anemia. Na 128, K 5.6, otherwise CMP unremarkable. Mag 1.5. . Trop negative. TSH 2.559. POC lactate 1.96. UA with 3+ protein, 1+ leukocyte esterase, 17 WBCs, many bacteria, 23 squams. Culture pending. POC US in ED with trace pericardial effusion. CXR with left pleural effusion and congestive change/edema, developing left lower lung zone consolidation not excluded in this hypoventilatory exam. S/p lasix 80mg inj x2 in ED. Admitted to  for further evaluation    Overview/Hospital Course:  S/p MICU admission for rescue BiPAP, diuresis, and UTI/CAP treatment weaned back to 2L prior to step down. Physical exam without obvious  hypervolemia, but pt demonstrated JVD and Echo 11/14 with CVP 15. Continued to use BiPAP QHS and with long naps. Does not have a machine at home, but could benefit from OP Sleep Medicine evaluation. PT/OT evaluation recs for moderate-intensity therapy, but family reports significant family support and motivation to do therapy at home. Continues to be hypervolemic, continuing IV lasix.    Interval History: NAEON. Volume status improving although still hypervolemic. More interactive and comfortable today on exam    Review of Systems   Constitutional:  Negative for activity change, appetite change and fever.   Respiratory:  Negative for cough, shortness of breath and wheezing.    Cardiovascular:  Negative for chest pain and leg swelling.   Gastrointestinal:  Positive for nausea. Negative for abdominal pain, constipation, diarrhea and vomiting.   Skin:  Negative for rash.   Neurological:  Negative for headaches.     Objective:     Vital Signs (Most Recent):  Temp: 98.3 °F (36.8 °C) (11/19/24 1601)  Pulse: (!) 59 (11/19/24 1949)  Resp: 17 (11/19/24 1601)  BP: 135/60 (11/19/24 1735)  SpO2: 100 % (11/19/24 1949) Vital Signs (24h Range):  Temp:  [97.6 °F (36.4 °C)-98.3 °F (36.8 °C)] 98.3 °F (36.8 °C)  Pulse:  [57-61] 59  Resp:  [17-25] 17  SpO2:  [95 %-100 %] 100 %  BP: (110-169)/(54-73) 135/60     Weight: 94.5 kg (208 lb 5.4 oz)  Body mass index is 35.76 kg/m².    Intake/Output Summary (Last 24 hours) at 11/19/2024 1956  Last data filed at 11/19/2024 1826  Gross per 24 hour   Intake 776 ml   Output 950 ml   Net -174 ml         Physical Exam  Vitals and nursing note reviewed.   HENT:      Head: Normocephalic and atraumatic.   Eyes:      Extraocular Movements: Extraocular movements intact.      Conjunctiva/sclera: Conjunctivae normal.   Neck:      Comments: JVD to mid-neck  Cardiovascular:      Rate and Rhythm: Normal rate and regular rhythm.      Pulses: Normal pulses.   Pulmonary:      Effort: Pulmonary effort is normal.  No respiratory distress.      Breath sounds: Rales (Diffusely throughout) present. No wheezing.   Abdominal:      Palpations: Abdomen is soft.      Tenderness: There is abdominal tenderness (epigastric to deep palpation). There is no guarding.   Musculoskeletal:      Right lower leg: No edema.      Left lower leg: No edema.   Skin:     General: Skin is warm and dry.   Neurological:      General: No focal deficit present.      Mental Status: She is alert and oriented to person, place, and time.   Psychiatric:         Mood and Affect: Mood normal.             Significant Labs: All pertinent labs within the past 24 hours have been reviewed.    Significant Imaging: I have reviewed all pertinent imaging results/findings within the past 24 hours.    Assessment/Plan:      * Acute on chronic diastolic congestive heart failure  Patient has Diastolic (HFpEF) heart failure that is Acute on chronic. On presentation their CHF was decompensated. Evidence of decompensated CHF on presentation includes: edema, elevated JVD, crackles on lung auscultation, weight gain, orthopnea, dyspnea on exertion (WHITLEY), and shortness of breath. The etiology of their decompensation is likely medication adjustments . Patient is on the HF pathway. Most recent BNP and echo results are listed below.  Recent Labs     11/18/24  1125   BNP 1,194*       Latest ECHO  Results for orders placed during the hospital encounter of 09/24/24    Echo    Interpretation Summary    Left Ventricle: The left ventricle is normal in size. Normal wall thickness. There is concentric remodeling. There is normal systolic function with a visually estimated ejection fraction of 60 - 65%. Grade III diastolic dysfunction. Elevated left ventricular filling pressure.    Right Ventricle: Normal right ventricular cavity size. Wall thickness is normal. Systolic function is normal. Pacemaker lead present in the ventricle.    The left atrium is severely dilated.    Aortic Valve: There is  a transcatheter valve replacement in the aortic position. It is reported to be a 26mm Phan Suzi S3 valve. Aortic valve area by VTI is 1.90 cm2. Aortic valve peak velocity is 1.64 m/s. Mean gradient is 6 mmHg. The dimensionless index is 0.57. There is mild aortic regurgitation.    Tricuspid Valve: There is mild regurgitation.    Pulmonary Artery: The estimated pulmonary artery systolic pressure is 48 mmHg.    IVC/SVC: Elevated venous pressure at 15 mmHg.    Pericardium: There is a small posterior effusion. No indication of cardiac tamponade.    Current Heart Failure Medications  spironolactone tablet 25 mg, Daily, Oral  hydrALAZINE injection 10 mg, Every 6 hours PRN, Intravenous  carvediloL tablet 12.5 mg, 2 times daily with meals, Oral  losartan tablet 50 mg, Daily, Oral  furosemide injection 80 mg, Every 12 hours, Intravenous    Plan  - Place on telemetry  - Place on fluid restriction of 1.5 L.   - Cardiology has not been consulted  - The patient's volume status is stable but not at their baseline as indicated by edema, elevated JVD, crackles on lung auscultation, weight gain, dyspnea on exertion (WHITLEY), and shortness of breath  - Restarting IV lasix, patient hypervolemic on exam today  - strict intake and output, daily standing weights    Intake/Output Summary (Last 24 hours) at 11/18/2024 2222  Last data filed at 11/18/2024 1845  Gross per 24 hour   Intake 495 ml   Output 970 ml   Net -475 ml             Irritant contact dermatitis due to friction or contact with body fluids, unspecified  Sacral wound with appreciated recs by Wound Care      Anxiety  - home Lorazepam      Pleural effusion  Patient found to have small pleural effusion on imaging. I have personally reviewed and interpreted the following imaging: CT. A thoracentesis was performed. Pleural fluid was sent for analysis. Labs reviewed, including  . Fluid most consistent with Transudate.  . Most likely etiology includes Congestive Heart Failure.  Management to include Diuresis      Hypomagnesemia  Patient has Abnormal Magnesium: hypomagnesemia. Will continue to monitor electrolytes closely. Will replace the affected electrolytes and repeat labs to be done after interventions completed. The patient's magnesium results have been reviewed and are listed below.  Recent Labs   Lab 11/10/24  1200   MG 1.5*        Hyperkalemia  The patients most recent potassium results are listed below.  Recent Labs     11/08/24  1015 11/10/24  1200   K 4.7 5.6*     Plan  - Monitor for arrhythmias with EKG and/or continuous telemetry.   - Treat the hyperkalemia with Potassium Binders.   - Monitor potassium: Daily  - The patient's hyperkalemia is stable    Anemia  Anemia is likely due to Iron deficiency. Most recent hemoglobin and hematocrit are listed below.  Recent Labs     11/17/24  0836 11/18/24  0233 11/19/24  0447   HGB 8.7* 8.0* 7.7*   HCT 26.9* 24.3* 24.2*       Plan  - Monitor serial CBC: Daily  - Transfuse PRBC if patient becomes hemodynamically unstable, symptomatic or H/H drops below 7/21.  - Patient has not received any PRBC transfusions to date  - Patient's anemia is currently stable    Acute on chronic respiratory failure with hypoxia  Patient with Hypoxic Respiratory failure which is Acute on chronic.  she is on home oxygen at 2 LPM. Supplemental oxygen was provided and noted- Oxygen Concentration (%):  [30] 30    .   Signs/symptoms of respiratory failure include- tachypnea and increased work of breathing. Contributing diagnoses includes - CHF Labs and images were reviewed. Patient Has not had a recent ABG. Will treat underlying causes and adjust management of respiratory failure as follows- diuresis    - Discussed with family that goal O2 should be 88-92% given COPD. They will continue to titrate at home with pulse ox.     CAD (coronary artery disease)  - history noted   - continue statin, plavix    Acute cystitis without hematuria  - patient with reports of  dysuria and dark urine noted at bedside  - UA infectious appearing, but with 23 squams  - urine culture with E Fecalis, treated for 5 day course with Zosyn      Hyponatremia  Hyponatremia is likely due to Heart Failure. The patient's most recent sodium results are listed below.  Recent Labs     11/17/24  0836 11/18/24  0233 11/19/24  0447   *  133*  133*  132*  132* 131* 129*       Plan  - Correct the sodium by 4-6mEq in 24 hours.   - Obtain the following studies: Urine sodium, urine osmolality, serum osmolality or TSH, T4.  - Will treat the hyponatremia with diuresis  - Monitor sodium Daily.   - Patient hyponatremia is stable    COPD (chronic obstructive pulmonary disease)  Patient's COPD is controlled currently.  Patient is currently off COPD Pathway. Continue scheduled inhalers Supplemental oxygen and monitor respiratory status closely.     - prn duonebs  - titrate O2 to 88-92%, discussed with family    Aortic stenosis  Echocardiogram with evidence of aortic stenosis.The patient's most recent echocardiogram result is listed below. We will manage the valvular abnormality by monitoring.     Echo    Result Date: 11/14/2024    Left Ventricle: The left ventricle is normal in size. Normal wall   thickness. There is normal systolic function with a visually estimated   ejection fraction of 60 - 65%. Ejection fraction is approximately 65%.    Right Ventricle: Normal right ventricular cavity size. Wall thickness   is normal. Systolic function is normal.    Left Atrium: Left atrium is moderately dilated.    Right Atrium: Right atrium is moderately dilated.    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 It is reported to be a 26 mm Phan Suzi valve. Mildly   calcified cusps. Aortic valve area by VTI is 0.8 cm2. Aortic valve peak   velocity is 2.9 m/s. Mean gradient is 21.8 mmHg. The dimensionless index   is 0.25.  msand AcT/ET ratio is 0.36. Findings and trend from  prior   echocardiography evaluation is suggestive of prosthetic valve stenosis.   Clinical correlation is required.    Mitral Valve: There is moderate mitral annular calcification present.    Tricuspid Valve: PISA radius is 0.6cm and Vena Contracta is 0.7cm   consistent with moderate to severe regurgitation.    Pulmonary Artery: There is moderate to severe pulmonary hypertension.   The estimated pulmonary artery systolic pressure is 62 mmHg.    IVC/SVC: Elevated venous pressure at 15 mmHg.    Pericardium: There is a trivial circumferential effusion.        Echo    Result Date: 11/11/2024    Left Ventricle: The left ventricle is normal in size. Normal wall   thickness. There is normal systolic function. Ejection fraction is   approximately 65%. There is normal diastolic function.    Right Ventricle: Normal right ventricular cavity size. Wall thickness   is normal. Systolic function is normal. Pacemaker lead present in the   ventricle.    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 It is reported to be a 26 mm Phan valve. The leaflets appear   mildly thickened. Aortic valve area by VTI is 0.7 cm2. LVOT diameter is   1.9 cm. Aortic valve peak velocity is 3.3 m/s. Mean gradient is 33.3 mmHg.   The dimensionless index is 0.26. Mild paravalvular regurgitation.    Mitral Valve: There is moderate mitral annular calcification present.    Tricuspid Valve: There is moderate regurgitation.    Pulmonary Artery: There is pulmonary hypertension. The estimated   pulmonary artery systolic pressure is 62 mmHg.    IVC/SVC: Intermediate venous pressure at 8 mmHg.        Echo    Result Date: 9/25/2024    Left Ventricle: The left ventricle is normal in size. Normal wall   thickness. There is concentric remodeling. There is normal systolic   function with a visually estimated ejection fraction of 60 - 65%. Grade   III diastolic dysfunction. Elevated left ventricular filling pressure.    Right  Ventricle: Normal right ventricular cavity size. Wall thickness   is normal. Systolic function is normal. Pacemaker lead present in the   ventricle.    The left atrium is severely dilated.    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 It is reported to be a 26mm Phan Suzi S3 valve. Aortic   valve area by VTI is 1.90 cm2. Aortic valve peak velocity is 1.64 m/s.   Mean gradient is 6 mmHg. The dimensionless index is 0.57. There is mild   aortic regurgitation.    Tricuspid Valve: There is mild regurgitation.    Pulmonary Artery: The estimated pulmonary artery systolic pressure is   48 mmHg.    IVC/SVC: Elevated venous pressure at 15 mmHg.    Pericardium: There is a small posterior effusion. No indication of   cardiac tamponade.        Echo    Result Date: 2/10/2024    Left Ventricle: The left ventricle is normal in size. Normal wall   thickness. There is concentric remodeling. Regional wall motion   abnormalities present. There is moderately reduced systolic function with   a visually estimated ejection fraction of 35 - 40%. Ejection fraction by   visual approximation is 35%. There is normal diastolic function.    Right Ventricle: Normal right ventricular cavity size. Wall thickness   is normal. Right ventricle wall motion  is normal. Systolic function is   normal. Pacemaker lead present in the ventricle.    Left Atrium: Left atrium is moderately dilated.    Right Atrium: Right atrium is mildly dilated. Lead present in the right   atrium.    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 Aortic valve area by VTI is 1.03 cm². Aortic valve peak velocity   is 2.07 m/s. Mean gradient is 11 mmHg. The dimensionless index is 0.30.   There is trace aortic regurgitation.    Mitral Valve: There is mild mitral annular calcification present.    Tricuspid Valve: There is mild regurgitation.    Pulmonary Artery: There is mild to moderate pulmonary  hypertension. The   estimated pulmonary artery systolic pressure is 47 mmHg.    IVC/SVC: Normal venous pressure at 3 mmHg.        - Continue judicious diuresis given forward-flow dependence for aortic valve stenosis  - Will need OP follow up after hospitalization to discuss post-TAVR aortic valve stenosis    Permanent atrial fibrillation  Patient has permanent atrial fibrillation. Patient is currently in sinus rhythm. [id number] Score: 5. The patients heart rate in the last 24 hours is as follows:  Pulse  Min: 59  Max: 65     Antiarrhythmics       Anticoagulants  apixaban tablet 5 mg, 2 times daily, Oral    Plan  - Replete lytes with a goal of K>4, Mg >2  - Patient is anticoagulated, see medications listed above.  - Patient's afib is currently controlled        Essential hypertension  Patients blood pressure range in the last 24 hours was: BP  Min: 95/46  Max: 221/86.The patient's inpatient anti-hypertensive regimen is listed below:  Current Antihypertensives  isosorbide mononitrate 24 hr tablet 60 mg, Daily, Oral  ranolazine 12 hr tablet 1,000 mg, 2 times daily, Oral  spironolactone tablet 25 mg, Daily, Oral  hydrALAZINE injection 10 mg, Every 6 hours PRN, Intravenous  carvediloL tablet 12.5 mg, 2 times daily with meals, Oral  losartan tablet 50 mg, Daily, Oral  furosemide tablet 80 mg, 2 times daily, Oral    Plan  - BP is uncontrolled, will adjust as follows: can consider increasing carvedilol if HR can tolerate   - states was previously on losartan, but recently discontinued. Consider resuming if clinically necessary.    Gastroesophageal reflux disease  - continue home PPI      HLD (hyperlipidemia)  - continue home statin      Type 2 diabetes mellitus  Patient's FSGs are controlled on current medication regimen.  Last A1c reviewed-   Lab Results   Component Value Date    HGBA1C 5.2 11/10/2024     Most recent fingerstick glucose reviewed-   Recent Labs   Lab 11/18/24 2051 11/19/24  0749 11/19/24  1201  11/19/24  1600   POCTGLUCOSE 129* 88 139* 161*       Current correctional scale  Low  Maintain anti-hyperglycemic dose as follows-   Antihyperglycemics (From admission, onward)      Start     Stop Route Frequency Ordered    11/10/24 1513  insulin aspart U-100 pen 0-5 Units         -- SubQ Before meals & nightly PRN 11/10/24 1414          Hold Oral hypoglycemics while patient is in the hospital.      VTE Risk Mitigation (From admission, onward)           Ordered     apixaban tablet 5 mg  2 times daily         11/11/24 1535     IP VTE HIGH RISK PATIENT  Once         11/10/24 1414     Reason for No Pharmacological VTE Prophylaxis  Once        Question:  Reasons:  Answer:  Already adequately anticoagulated on oral Anticoagulants    11/10/24 1414     Place sequential compression device  Until discontinued         11/10/24 1414                    Discharge Planning   MIMI: 11/20/2024     Code Status: Full Code   Is the patient medically ready for discharge?:     Reason for patient still in hospital treatment, trending condition  Discharge Plan A: Home Health   Discharge Delays: None known at this time              Ellie Lincoln MD  Department of Hospital Medicine   [facility] - [facility] ([place]

## 2024-11-20 NOTE — ASSESSMENT & PLAN NOTE
Hyponatremia is likely due to Heart Failure. The patient's most recent sodium results are listed below.  Recent Labs     11/17/24  0836 11/18/24  0233 11/19/24  0447   *  133*  133*  132*  132* 131* 129*       Plan  - Correct the sodium by 4-6mEq in 24 hours.   - Obtain the following studies: Urine sodium, urine osmolality, serum osmolality or TSH, T4.  - Will treat the hyponatremia with diuresis  - Monitor sodium Daily.   - Patient hyponatremia is stable

## 2024-11-21 LAB
ALBUMIN SERPL BCP-MCNC: 2.3 G/DL (ref 3.5–5.2)
ALP SERPL-CCNC: 37 U/L (ref 40–150)
ALT SERPL W/O P-5'-P-CCNC: 9 U/L (ref 10–44)
ANION GAP SERPL CALC-SCNC: 8 MMOL/L (ref 8–16)
AST SERPL-CCNC: 16 U/L (ref 10–40)
BASOPHILS # BLD AUTO: 0.03 K/UL (ref 0–0.2)
BASOPHILS NFR BLD: 0.6 % (ref 0–1.9)
BILIRUB SERPL-MCNC: 0.3 MG/DL (ref 0.1–1)
BUN SERPL-MCNC: 24 MG/DL (ref 8–23)
CALCIUM SERPL-MCNC: 8.9 MG/DL (ref 8.7–10.5)
CHLORIDE SERPL-SCNC: 88 MMOL/L (ref 95–110)
CO2 SERPL-SCNC: 37 MMOL/L (ref 23–29)
CREAT SERPL-MCNC: 0.7 MG/DL (ref 0.5–1.4)
DIFFERENTIAL METHOD BLD: ABNORMAL
EOSINOPHIL # BLD AUTO: 0 K/UL (ref 0–0.5)
EOSINOPHIL NFR BLD: 0.6 % (ref 0–8)
ERYTHROCYTE [DISTWIDTH] IN BLOOD BY AUTOMATED COUNT: 17.9 % (ref 11.5–14.5)
EST. GFR  (NO RACE VARIABLE): >60 ML/MIN/1.73 M^2
GLUCOSE SERPL-MCNC: 92 MG/DL (ref 70–110)
HCT VFR BLD AUTO: 26.1 % (ref 37–48.5)
HGB BLD-MCNC: 8.2 G/DL (ref 12–16)
IMM GRANULOCYTES # BLD AUTO: 0.04 K/UL (ref 0–0.04)
IMM GRANULOCYTES NFR BLD AUTO: 0.8 % (ref 0–0.5)
LYMPHOCYTES # BLD AUTO: 0.8 K/UL (ref 1–4.8)
LYMPHOCYTES NFR BLD: 16.7 % (ref 18–48)
MAGNESIUM SERPL-MCNC: 1.6 MG/DL (ref 1.6–2.6)
MCH RBC QN AUTO: 26.6 PG (ref 27–31)
MCHC RBC AUTO-ENTMCNC: 31.4 G/DL (ref 32–36)
MCV RBC AUTO: 85 FL (ref 82–98)
MONOCYTES # BLD AUTO: 0.8 K/UL (ref 0.3–1)
MONOCYTES NFR BLD: 15.8 % (ref 4–15)
NEUTROPHILS # BLD AUTO: 3.1 K/UL (ref 1.8–7.7)
NEUTROPHILS NFR BLD: 65.5 % (ref 38–73)
NRBC BLD-RTO: 0 /100 WBC
PHOSPHATE SERPL-MCNC: 3.2 MG/DL (ref 2.7–4.5)
PLATELET # BLD AUTO: 301 K/UL (ref 150–450)
PMV BLD AUTO: 9.8 FL (ref 9.2–12.9)
POCT GLUCOSE: 104 MG/DL (ref 70–110)
POCT GLUCOSE: 127 MG/DL (ref 70–110)
POCT GLUCOSE: 171 MG/DL (ref 70–110)
POCT GLUCOSE: 185 MG/DL (ref 70–110)
POTASSIUM SERPL-SCNC: 4 MMOL/L (ref 3.5–5.1)
PROT SERPL-MCNC: 5.9 G/DL (ref 6–8.4)
RBC # BLD AUTO: 3.08 M/UL (ref 4–5.4)
SODIUM SERPL-SCNC: 133 MMOL/L (ref 136–145)
WBC # BLD AUTO: 4.74 K/UL (ref 3.9–12.7)

## 2024-11-21 PROCEDURE — 63600175 PHARM REV CODE 636 W HCPCS

## 2024-11-21 PROCEDURE — 97535 SELF CARE MNGMENT TRAINING: CPT | Mod: CO

## 2024-11-21 PROCEDURE — 21400001 HC TELEMETRY ROOM

## 2024-11-21 PROCEDURE — 94761 N-INVAS EAR/PLS OXIMETRY MLT: CPT

## 2024-11-21 PROCEDURE — 84100 ASSAY OF PHOSPHORUS: CPT

## 2024-11-21 PROCEDURE — 83735 ASSAY OF MAGNESIUM: CPT

## 2024-11-21 PROCEDURE — 27000221 HC OXYGEN, UP TO 24 HOURS

## 2024-11-21 PROCEDURE — 80053 COMPREHEN METABOLIC PANEL: CPT

## 2024-11-21 PROCEDURE — 25000003 PHARM REV CODE 250

## 2024-11-21 PROCEDURE — 97116 GAIT TRAINING THERAPY: CPT | Mod: CQ

## 2024-11-21 PROCEDURE — 85025 COMPLETE CBC W/AUTO DIFF WBC: CPT

## 2024-11-21 PROCEDURE — 25000003 PHARM REV CODE 250: Performed by: INTERNAL MEDICINE

## 2024-11-21 PROCEDURE — 36415 COLL VENOUS BLD VENIPUNCTURE: CPT

## 2024-11-21 PROCEDURE — 97110 THERAPEUTIC EXERCISES: CPT | Mod: CQ

## 2024-11-21 PROCEDURE — 99900035 HC TECH TIME PER 15 MIN (STAT)

## 2024-11-21 PROCEDURE — 25000003 PHARM REV CODE 250: Performed by: STUDENT IN AN ORGANIZED HEALTH CARE EDUCATION/TRAINING PROGRAM

## 2024-11-21 PROCEDURE — 97530 THERAPEUTIC ACTIVITIES: CPT | Mod: CO

## 2024-11-21 RX ADMIN — FUROSEMIDE 80 MG: 10 INJECTION, SOLUTION INTRAVENOUS at 08:11

## 2024-11-21 RX ADMIN — ATORVASTATIN CALCIUM 10 MG: 10 TABLET, FILM COATED ORAL at 08:11

## 2024-11-21 RX ADMIN — RANOLAZINE 1000 MG: 500 TABLET, EXTENDED RELEASE ORAL at 08:11

## 2024-11-21 RX ADMIN — CLOPIDOGREL BISULFATE 75 MG: 75 TABLET ORAL at 08:11

## 2024-11-21 RX ADMIN — PANTOPRAZOLE SODIUM 40 MG: 40 TABLET, DELAYED RELEASE ORAL at 08:11

## 2024-11-21 RX ADMIN — ACETAMINOPHEN 650 MG: 325 TABLET ORAL at 08:11

## 2024-11-21 RX ADMIN — CARVEDILOL 12.5 MG: 12.5 TABLET, FILM COATED ORAL at 07:11

## 2024-11-21 RX ADMIN — CARVEDILOL 12.5 MG: 12.5 TABLET, FILM COATED ORAL at 05:11

## 2024-11-21 RX ADMIN — Medication: at 08:11

## 2024-11-21 RX ADMIN — ACETAMINOPHEN 650 MG: 325 TABLET ORAL at 11:11

## 2024-11-21 RX ADMIN — LOSARTAN POTASSIUM 50 MG: 50 TABLET, FILM COATED ORAL at 08:11

## 2024-11-21 RX ADMIN — ISOSORBIDE MONONITRATE 60 MG: 60 TABLET, EXTENDED RELEASE ORAL at 08:11

## 2024-11-21 RX ADMIN — APIXABAN 5 MG: 5 TABLET, FILM COATED ORAL at 08:11

## 2024-11-21 RX ADMIN — MONTELUKAST 10 MG: 10 TABLET, FILM COATED ORAL at 08:11

## 2024-11-21 RX ADMIN — DULOXETINE HYDROCHLORIDE 20 MG: 20 CAPSULE, DELAYED RELEASE ORAL at 08:11

## 2024-11-21 RX ADMIN — SPIRONOLACTONE 25 MG: 25 TABLET ORAL at 08:11

## 2024-11-21 RX ADMIN — GABAPENTIN 300 MG: 300 CAPSULE ORAL at 08:11

## 2024-11-21 RX ADMIN — OXYCODONE HYDROCHLORIDE AND ACETAMINOPHEN 1000 MG: 500 TABLET ORAL at 08:11

## 2024-11-21 NOTE — ASSESSMENT & PLAN NOTE
Patient has Diastolic (HFpEF) heart failure that is Acute on chronic. On presentation their CHF was decompensated. Evidence of decompensated CHF on presentation includes: edema, elevated JVD, crackles on lung auscultation, weight gain, orthopnea, dyspnea on exertion (WHITLEY), and shortness of breath. The etiology of their decompensation is likely medication adjustments . Patient is on the HF pathway. Most recent BNP and echo results are listed below.  Recent Labs     11/18/24  1125   BNP 1,194*       Latest ECHO  Results for orders placed during the hospital encounter of 09/24/24    Echo    Interpretation Summary    Left Ventricle: The left ventricle is normal in size. Normal wall thickness. There is concentric remodeling. There is normal systolic function with a visually estimated ejection fraction of 60 - 65%. Grade III diastolic dysfunction. Elevated left ventricular filling pressure.    Right Ventricle: Normal right ventricular cavity size. Wall thickness is normal. Systolic function is normal. Pacemaker lead present in the ventricle.    The left atrium is severely dilated.    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 It is reported to be a 26mm Phan Suzi S3 valve. Aortic valve area by VTI is 1.90 cm2. Aortic valve peak velocity is 1.64 m/s. Mean gradient is 6 mmHg. The dimensionless index is 0.57. There is mild aortic regurgitation.    Tricuspid Valve: There is mild regurgitation.    Pulmonary Artery: The estimated pulmonary artery systolic pressure is 48 mmHg.    IVC/SVC: Elevated venous pressure at 15 mmHg.    Pericardium: There is a small posterior effusion. No indication of cardiac tamponade.    Current Heart Failure Medications  spironolactone tablet 25 mg, Daily, Oral  hydrALAZINE injection 10 mg, Every 6 hours PRN, Intravenous  carvediloL tablet 12.5 mg, 2 times daily with meals, Oral  losartan tablet 50 mg, Daily, Oral  furosemide injection 80 mg, Every 12 hours,  Intravenous    Plan  - Place on telemetry  - Place on fluid restriction of 1.5 L.   - Cardiology has not been consulted  - Continue IV lasix. Patient still hypervolemic on physical exam, although improved from prior.   - strict intake and output, daily standing weights    Intake/Output Summary (Last 24 hours) at 11/20/2024 2020  Last data filed at 11/20/2024 1954  Gross per 24 hour   Intake 960 ml   Output 1900 ml   Net -940 ml

## 2024-11-21 NOTE — SUBJECTIVE & OBJECTIVE
Interval History: see above    Review of Systems   Constitutional:  Positive for activity change. Negative for fever.   HENT:  Negative for trouble swallowing.    Respiratory:  Positive for shortness of breath. Negative for cough and choking.    Cardiovascular:  Negative for chest pain and leg swelling.   Gastrointestinal:  Negative for constipation, diarrhea and nausea.   Genitourinary:  Negative for difficulty urinating.   Musculoskeletal:  Negative for arthralgias and back pain.   Neurological:  Negative for numbness and headaches.   Psychiatric/Behavioral:  Negative for behavioral problems.      Objective:     Vital Signs (Most Recent):  Temp: 98 °F (36.7 °C) (11/21/24 1059)  Pulse: (!) 59 (11/21/24 1059)  Resp: 16 (11/21/24 1059)  BP: (!) 126/58 (11/21/24 1059)  SpO2: 100 % (11/21/24 1059) Vital Signs (24h Range):  Temp:  [97.4 °F (36.3 °C)-98.3 °F (36.8 °C)] 98 °F (36.7 °C)  Pulse:  [59-86] 59  Resp:  [16-25] 16  SpO2:  [96 %-100 %] 100 %  BP: (126-183)/(58-79) 126/58     Weight: 93.6 kg (206 lb 5.6 oz)  Body mass index is 35.42 kg/m².    Intake/Output Summary (Last 24 hours) at 11/21/2024 1210  Last data filed at 11/21/2024 0833  Gross per 24 hour   Intake 730 ml   Output 1750 ml   Net -1020 ml      Physical Exam  Constitutional:       General: She is not in acute distress.     Appearance: Normal appearance. She is obese. She is not ill-appearing, toxic-appearing or diaphoretic.   HENT:      Head: Normocephalic and atraumatic.      Nose: Nose normal.      Mouth/Throat:      Mouth: Mucous membranes are moist.      Pharynx: Oropharynx is clear.   Eyes:      General: No scleral icterus.     Extraocular Movements: Extraocular movements intact.      Conjunctiva/sclera: Conjunctivae normal.      Pupils: Pupils are equal, round, and reactive to light.   Cardiovascular:      Rate and Rhythm: Normal rate and regular rhythm.      Pulses: Normal pulses.      Heart sounds: No murmur heard.  Pulmonary:      Effort:  Pulmonary effort is normal. No respiratory distress.      Breath sounds: No stridor. Rales (improving) present. No wheezing or rhonchi.   Chest:      Chest wall: No tenderness.   Abdominal:      General: Abdomen is flat. Bowel sounds are normal. There is no distension.      Palpations: Abdomen is soft.      Tenderness: There is no abdominal tenderness. There is no right CVA tenderness, guarding or rebound.   Musculoskeletal:         General: No swelling, tenderness, deformity or signs of injury. Normal range of motion.      Cervical back: Normal range of motion and neck supple. No rigidity or tenderness.      Right lower leg: No edema.      Left lower leg: No edema.   Skin:     General: Skin is warm and dry.      Coloration: Skin is not jaundiced.      Findings: No erythema or rash.   Neurological:      General: No focal deficit present.      Mental Status: She is alert and oriented to person, place, and time. Mental status is at baseline.      Motor: No weakness.   Psychiatric:         Mood and Affect: Mood normal.         Behavior: Behavior normal.         Thought Content: Thought content normal.         Judgment: Judgment normal.     Computed MELD 3.0 unavailable. One or more values for this score either were not found within the given timeframe or did not fit some other criterion.  Computed MELD-Na unavailable. One or more values for this score either were not found within the given timeframe or did not fit some other criterion.      Significant Labs:  CBC:  Recent Labs   Lab 11/20/24  0732 11/21/24  0348   WBC 7.28 4.74   HGB 8.7* 8.2*   HCT 26.0* 26.1*    301     CMP:  Recent Labs   Lab 11/20/24  0732 11/21/24  0348   * 133*   K 4.6 4.0   CL 87* 88*   CO2 35* 37*    92   BUN 28* 24*   CREATININE 0.8 0.7   CALCIUM 9.0 8.9   PROT 6.2 5.9*   ALBUMIN 2.4* 2.3*   BILITOT 0.4 0.3   ALKPHOS 37* 37*   AST 17 16   ALT 13 9*   ANIONGAP 9 8

## 2024-11-21 NOTE — ASSESSMENT & PLAN NOTE
Patient with Hypoxic Respiratory failure which is Acute on chronic.  she is on home oxygen at 2 LPM. Supplemental oxygen was provided and noted- Oxygen Concentration (%):  [30] 30  Signs/symptoms of respiratory failure include- tachypnea and increased work of breathing. Contributing diagnoses includes - CHF Labs and images were reviewed. Patient Has not had a recent ABG. Will treat underlying causes and adjust management of respiratory failure as follows- diuresis  - Discussed with family that goal O2 should be 88-92% given COPD. They will continue to titrate at home with pulse ox.   SEE above

## 2024-11-21 NOTE — PLAN OF CARE
Problem: Adult Inpatient Plan of Care  Goal: Plan of Care Review  Outcome: Progressing  Goal: Patient-Specific Goal (Individualized)  Outcome: Progressing  Goal: Absence of Hospital-Acquired Illness or Injury  Outcome: Progressing  Goal: Optimal Comfort and Wellbeing  Outcome: Progressing  Goal: Readiness for Transition of Care  Outcome: Progressing     Problem: Infection  Goal: Absence of Infection Signs and Symptoms  Outcome: Progressing     Problem: Diabetes Comorbidity  Goal: Blood Glucose Level Within Targeted Range  Outcome: Progressing     Problem: Acute Kidney Injury/Impairment  Goal: Fluid and Electrolyte Balance  Outcome: Progressing  Goal: Improved Oral Intake  Outcome: Progressing     Problem: Wound  Goal: Optimal Coping  Outcome: Progressing  Goal: Optimal Functional Ability  Outcome: Progressing  Goal: Absence of Infection Signs and Symptoms  Outcome: Progressing   Pt up in chair a couple of times today, still receiving IV lasix following fluid restriction

## 2024-11-21 NOTE — ASSESSMENT & PLAN NOTE
Hyponatremia is likely due to Heart Failure. The patient's most recent sodium results are listed below.  Recent Labs     11/19/24  0447 11/20/24  0732 11/21/24  0348   * 131* 133*       Plan  - Correct the sodium by 4-6mEq in 24 hours.   - Obtain the following studies: Urine sodium, urine osmolality, serum osmolality or TSH, T4.  - Will treat the hyponatremia with diuresis  - Monitor sodium Daily.   - Patient hyponatremia is stable

## 2024-11-21 NOTE — PT/OT/SLP PROGRESS
Occupational Therapy   Co-Treatment    Co-treatment performed due to patient's multiple deficits requiring two skilled therapists to appropriately and safely assess patient's strength, endurance, functional mobility, and ADL performance while facilitating functional tasks in addition to accommodating for patient's activity tolerance and medical acuity.        Name: Adriana Strong  MRN: [id number]  Admitting Diagnosis:  Acute on chronic diastolic congestive heart failure       Recommendations:     Discharge Recommendations: Moderate Intensity Therapy  Discharge Equipment Recommendations:  to be determined by next level of care  Barriers to discharge:       Assessment:     Adriana Strong is a 79 y.o. female with a medical diagnosis of Acute on chronic diastolic congestive heart failure.  In today's session, patient was agreeable to therapy. Patient completed functional mobility from EOB > bedside chair and completed self-care tasks seated in bedside chair. After functional mobility to the bedside chair patient reports she feels dizzy. Bedside chair was reclined and SPO2 and BP was monitored. SPO2 at 96-97% and BP at 135/63. Nurse notified. Performance deficits affecting function are weakness, impaired endurance, impaired balance, gait instability, impaired functional mobility, decreased upper extremity function, decreased ROM, impaired cardiopulmonary response to activity.     Rehab Prognosis:  Good; patient would benefit from acute skilled OT services to address these deficits and reach maximum level of function.       Plan:     Patient to be seen 4 x/week to address the above listed problems via self-care/home management, therapeutic activities, therapeutic exercises  Plan of Care Expires: 12/14/24  Plan of Care Reviewed with: patient, spouse    Subjective     Chief Complaint: N/A  Patient/Family Comments/goals: Patient reports she is dizzy.  Pain/Comfort:  Pain Rating 1: 0/10    Objective:     Communicated with:  Nurse prior to session.  Patient found HOB elevated with oxygen, telemetry, PureWick, pulse ox (continuous) upon OT entry to room.    A client care conference was completed by the OTR and the ACKERMAN prior to treatment by the ACKERMAN to discuss the patient's POC and current status.    General Precautions: Standard, fall    Orthopedic Precautions:N/A  Braces: N/A  Respiratory Status: Nasal cannula, flow 2 L/min     Occupational Performance:     Bed Mobility:    Patient completed Supine to Sit with minimum assistance     Functional Mobility/Transfers:  Patient completed x1 Sit <> Stand Transfer from EOB with contact guard assistance with rolling walker   Patient completed Bed > Chair Transfer using Step Transfer technique with close contact guard assistance with rolling walker    Activities of Daily Living:  Grooming: stand by assistance for facial hygiene and hair care seated in bedside chair.      [facility] 6 Click ADL: 15    Treatment & Education:  - Patient educated on pursed lip breathing secondary to SOB.  - Patient educated on role of occupational therapy, POC, and safety during ADLs and functional mobility. Patient and OT discussed importance of safe, continued mobility to optimize daily living skills. Patient verbalized understanding. Patient given instruction to call for medical staff/nurse for assistance.       Patient left up in chair with call button in reach, nurse notified, and spouse present    GOALS:   Multidisciplinary Problems       Occupational Therapy Goals          Problem: Occupational Therapy    Goal Priority Disciplines Outcome Interventions   Occupational Therapy Goal     OT, PT/OT Progressing    Description: Goals to be met by: 12/14/24 (1 mo)     Patient will increase functional independence with ADLs by performing:    UE Dressing with Fraser.  LE Dressing with Fraser.  Grooming while standing at sink with Fraser.  Toileting from toilet with Fraser for hygiene and clothing  management.   Rolling to Bilateral with Grand.   Supine to sit with Grand.  Step transfer with Grand  Toilet transfer to toilet with Grand.                         Time Tracking:     OT Date of Treatment: 11/21/24  OT Start Time: 0937  OT Stop Time: 1003  OT Total Time (min): 26 min    Billable Minutes:Self Care/Home Management 12  Therapeutic Activity 14    OT/MELISSA: MELISSA     Number of MELISSA visits since last OT visit: 1    11/21/2024

## 2024-11-21 NOTE — ASSESSMENT & PLAN NOTE
Anemia is likely due to Iron deficiency. Most recent hemoglobin and hematocrit are listed below.  Recent Labs     11/18/24  0233 11/19/24  0447 11/20/24  0732   HGB 8.0* 7.7* 8.7*   HCT 24.3* 24.2* 26.0*       Plan  - Monitor serial CBC: Daily  - Transfuse PRBC if patient becomes hemodynamically unstable, symptomatic or H/H drops below 7/21.  - Patient has not received any PRBC transfusions to date  - Patient's anemia is currently stable

## 2024-11-21 NOTE — ASSESSMENT & PLAN NOTE
Patients blood pressure range in the last 24 hours was: BP  Min: 95/46  Max: 221/86.The patient's inpatient anti-hypertensive regimen is listed below:  Current Antihypertensives  isosorbide mononitrate 24 hr tablet 60 mg, Daily, Oral  ranolazine 12 hr tablet 1,000 mg, 2 times daily, Oral  spironolactone tablet 25 mg, Daily, Oral  hydrALAZINE injection 10 mg, Every 6 hours PRN, Intravenous  carvediloL tablet 12.5 mg, 2 times daily with meals, Oral  losartan tablet 50 mg, Daily, Oral  furosemide injection 80 mg, Every 12 hours, Intravenous    Plan  - BP is uncontrolled, will adjust as follows: can consider increasing carvedilol if HR can tolerate   - states was previously on losartan, but recently discontinued. Consider resuming if clinically necessary.

## 2024-11-21 NOTE — ASSESSMENT & PLAN NOTE
Patient's FSGs are controlled on current medication regimen.  Last A1c reviewed-   Lab Results   Component Value Date    HGBA1C 5.2 11/10/2024     Most recent fingerstick glucose reviewed-   Recent Labs   Lab 11/20/24  0838 11/20/24  1155 11/20/24  1504 11/20/24  1947   POCTGLUCOSE 111* 149* 160* 113*       Current correctional scale  Low  Maintain anti-hyperglycemic dose as follows-   Antihyperglycemics (From admission, onward)    Start     Stop Route Frequency Ordered    11/10/24 1513  insulin aspart U-100 pen 0-5 Units         -- SubQ Before meals & nightly PRN 11/10/24 1414        Hold Oral hypoglycemics while patient is in the hospital.

## 2024-11-21 NOTE — ASSESSMENT & PLAN NOTE
Patient's FSGs are controlled on current medication regimen.  Last A1c reviewed-   Lab Results   Component Value Date    HGBA1C 5.2 11/10/2024     Most recent fingerstick glucose reviewed-   Recent Labs   Lab 11/20/24  1504 11/20/24  1947 11/21/24  0729 11/21/24  1058   POCTGLUCOSE 160* 113* 104 185*       Current correctional scale  Low  Maintain anti-hyperglycemic dose as follows-   Antihyperglycemics (From admission, onward)    Start     Stop Route Frequency Ordered    11/10/24 1513  insulin aspart U-100 pen 0-5 Units         -- SubQ Before meals & nightly PRN 11/10/24 1414        Hold Oral hypoglycemics while patient is in the hospital.

## 2024-11-21 NOTE — PROGRESS NOTES
[facility] ([place] [id number])  [facility] Medicine  Progress Note    Patient Name: Adriana Strong  MRN: [id number]  Patient Class: IP- Inpatient   Admission Date: 11/10/2024  Length of Stay: 10 days  Attending Physician: Ellie Lincoln MD  Primary Care Provider: Krzysztof Mcgraw MD        Subjective:     Principal Problem:Acute on chronic diastolic congestive heart failure        HPI:  Adriana Strong is a 79 y.o.F with hx of diastolic HF, Afib on elqiuis, s/p pacemaker, HLD, HTN, non obstructive CAD, s/p TAVR, asthma, COPD, on home oxygen (2L via NC) who presents to [facility] for complaints of worsening shortness of breath, weight gain. Patient reports SOB at rest and with exertion. States she saw cardiology last week where labs were performed. She was notified that her sodium was low and MD requested that patient hold her lasix x3d, continue spironolactone and after the 3d start lasix 40mg daily and additional prn. Otherwise, patient reports compliance with medications, fluid restriction, Na restriction. Also endorsing dysuria for the last couple of days as well as decreased oral intake. Denies fever, chills, chest pain, palpitations, abdominal pain, n/v/d, headaches, or any other symptoms at this time.     In ED: AF, hypertensive, tachypneic on 2-4L via NC. CBC with chronic, stable anemia. Na 128, K 5.6, otherwise CMP unremarkable. Mag 1.5. . Trop negative. TSH 2.559. POC lactate 1.96. UA with 3+ protein, 1+ leukocyte esterase, 17 WBCs, many bacteria, 23 squams. Culture pending. POC US in ED with trace pericardial effusion. CXR with left pleural effusion and congestive change/edema, developing left lower lung zone consolidation not excluded in this hypoventilatory exam. S/p lasix 80mg inj x2 in ED. Admitted to  for further evaluation    Overview/Hospital Course:  S/p MICU admission for rescue BiPAP, diuresis, and UTI/CAP treatment weaned back to 2L prior to step down. Physical exam without obvious  hypervolemia, but pt demonstrated JVD and Echo 11/14 with CVP 15. Continued to use BiPAP QHS and with long naps. Does not have a machine at home, but could benefit from OP Sleep Medicine evaluation. PT/OT evaluation recs for moderate-intensity therapy, but family reports significant family support and motivation to do therapy at home. Continues to be hypervolemic, continuing IV lasix.    Interval History: NAEON. Volume status improving although still hypervolemic. Crackles to mid lungs today, improved from prior. JVD down to mid-lower neck. Cardiorenal TACOS now resolved. Likely needs another day or two of IV diuresis then can transition to PO.     Review of Systems   Constitutional:  Negative for activity change, appetite change and fever.   Respiratory:  Negative for cough, shortness of breath and wheezing.    Cardiovascular:  Negative for chest pain and leg swelling.   Gastrointestinal:  Negative for abdominal pain, constipation, diarrhea and vomiting.   Skin:  Negative for rash.   Neurological:  Negative for headaches.     Objective:     Vital Signs (Most Recent):  Temp: 98.1 °F (36.7 °C) (11/20/24 1930)  Pulse: 60 (11/20/24 1930)  Resp: 18 (11/20/24 1930)  BP: 134/63 (11/20/24 1930)  SpO2: 100 % (11/20/24 1930) Vital Signs (24h Range):  Temp:  [98 °F (36.7 °C)-98.8 °F (37.1 °C)] 98.1 °F (36.7 °C)  Pulse:  [59-65] 60  Resp:  [17-24] 18  SpO2:  [95 %-100 %] 100 %  BP: (110-161)/(47-67) 134/63     Weight: 95.2 kg (209 lb 14.1 oz)  Body mass index is 36.03 kg/m².    Intake/Output Summary (Last 24 hours) at 11/20/2024 2017  Last data filed at 11/20/2024 1954  Gross per 24 hour   Intake 960 ml   Output 1900 ml   Net -940 ml         Physical Exam  Vitals and nursing note reviewed.   HENT:      Head: Normocephalic and atraumatic.   Eyes:      Extraocular Movements: Extraocular movements intact.      Conjunctiva/sclera: Conjunctivae normal.   Neck:      Comments: JVD to mid-neck  Cardiovascular:      Rate and Rhythm:  Normal rate and regular rhythm.      Pulses: Normal pulses.   Pulmonary:      Effort: Pulmonary effort is normal. No respiratory distress.      Breath sounds: Rales (to mid lungs, improved from prior) present. No wheezing.   Abdominal:      Palpations: Abdomen is soft.      Tenderness: There is abdominal tenderness (epigastric to deep palpation). There is no guarding.   Musculoskeletal:      Right lower leg: No edema.      Left lower leg: No edema.   Skin:     General: Skin is warm and dry.   Neurological:      General: No focal deficit present.      Mental Status: She is alert and oriented to person, place, and time.   Psychiatric:         Mood and Affect: Mood normal.             Significant Labs: All pertinent labs within the past 24 hours have been reviewed.  CBC:   Recent Labs   Lab 11/19/24 0447 11/20/24  0732   WBC 6.30 7.28   HGB 7.7* 8.7*   HCT 24.2* 26.0*    298     CMP:   Recent Labs   Lab 11/19/24 0447 11/20/24  0732   * 131*   K 4.3 4.6   CL 85* 87*   CO2 34* 35*   GLU 86 101   BUN 38* 28*   CREATININE 1.2 0.8   CALCIUM 9.0 9.0   PROT 6.0 6.2   ALBUMIN 2.3* 2.4*   BILITOT 0.4 0.4   ALKPHOS 36* 37*   AST 16 17   ALT 10 13   ANIONGAP 10 9       Significant Imaging: I have reviewed all pertinent imaging results/findings within the past 24 hours.    Assessment/Plan:      * Acute on chronic diastolic congestive heart failure  Patient has Diastolic (HFpEF) heart failure that is Acute on chronic. On presentation their CHF was decompensated. Evidence of decompensated CHF on presentation includes: edema, elevated JVD, crackles on lung auscultation, weight gain, orthopnea, dyspnea on exertion (WHITLEY), and shortness of breath. The etiology of their decompensation is likely medication adjustments . Patient is on the HF pathway. Most recent BNP and echo results are listed below.  Recent Labs     11/18/24  1125   BNP 1,194*       Latest ECHO  Results for orders placed during the hospital encounter of  09/24/24    Echo    Interpretation Summary    Left Ventricle: The left ventricle is normal in size. Normal wall thickness. There is concentric remodeling. There is normal systolic function with a visually estimated ejection fraction of 60 - 65%. Grade III diastolic dysfunction. Elevated left ventricular filling pressure.    Right Ventricle: Normal right ventricular cavity size. Wall thickness is normal. Systolic function is normal. Pacemaker lead present in the ventricle.    The left atrium is severely dilated.    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 It is reported to be a 26mm Phan Suzi S3 valve. Aortic valve area by VTI is 1.90 cm2. Aortic valve peak velocity is 1.64 m/s. Mean gradient is 6 mmHg. The dimensionless index is 0.57. There is mild aortic regurgitation.    Tricuspid Valve: There is mild regurgitation.    Pulmonary Artery: The estimated pulmonary artery systolic pressure is 48 mmHg.    IVC/SVC: Elevated venous pressure at 15 mmHg.    Pericardium: There is a small posterior effusion. No indication of cardiac tamponade.    Current Heart Failure Medications  spironolactone tablet 25 mg, Daily, Oral  hydrALAZINE injection 10 mg, Every 6 hours PRN, Intravenous  carvediloL tablet 12.5 mg, 2 times daily with meals, Oral  losartan tablet 50 mg, Daily, Oral  furosemide injection 80 mg, Every 12 hours, Intravenous    Plan  - Place on telemetry  - Place on fluid restriction of 1.5 L.   - Cardiology has not been consulted  - Continue IV lasix. Patient still hypervolemic on physical exam, although improved from prior.   - strict intake and output, daily standing weights    Intake/Output Summary (Last 24 hours) at 11/20/2024 2020  Last data filed at 11/20/2024 1954  Gross per 24 hour   Intake 960 ml   Output 1900 ml   Net -940 ml             Irritant contact dermatitis due to friction or contact with body fluids, unspecified  Sacral wound with appreciated recs by Wound  Care      Anxiety  - home Lorazepam      Pleural effusion  Patient found to have small pleural effusion on imaging. I have personally reviewed and interpreted the following imaging: CT. A thoracentesis was performed. Pleural fluid was sent for analysis. Labs reviewed, including  . Fluid most consistent with Transudate.  . Most likely etiology includes Congestive Heart Failure. Management to include Diuresis      Hypomagnesemia  Patient has Abnormal Magnesium: hypomagnesemia. Will continue to monitor electrolytes closely. Will replace the affected electrolytes and repeat labs to be done after interventions completed. The patient's magnesium results have been reviewed and are listed below.  Recent Labs   Lab 11/10/24  1200   MG 1.5*        Hyperkalemia  The patients most recent potassium results are listed below.  Recent Labs     11/08/24  1015 11/10/24  1200   K 4.7 5.6*     Plan  - Monitor for arrhythmias with EKG and/or continuous telemetry.   - Treat the hyperkalemia with Potassium Binders.   - Monitor potassium: Daily  - The patient's hyperkalemia is stable    Anemia  Anemia is likely due to Iron deficiency. Most recent hemoglobin and hematocrit are listed below.  Recent Labs     11/18/24  0233 11/19/24  0447 11/20/24  0732   HGB 8.0* 7.7* 8.7*   HCT 24.3* 24.2* 26.0*       Plan  - Monitor serial CBC: Daily  - Transfuse PRBC if patient becomes hemodynamically unstable, symptomatic or H/H drops below 7/21.  - Patient has not received any PRBC transfusions to date  - Patient's anemia is currently stable    Acute on chronic respiratory failure with hypoxia  Patient with Hypoxic Respiratory failure which is Acute on chronic.  she is on home oxygen at 2 LPM. Supplemental oxygen was provided and noted- Oxygen Concentration (%):  [30] 30    .   Signs/symptoms of respiratory failure include- tachypnea and increased work of breathing. Contributing diagnoses includes - CHF Labs and images were reviewed. Patient Has not had  a recent ABG. Will treat underlying causes and adjust management of respiratory failure as follows- diuresis    - Discussed with family that goal O2 should be 88-92% given COPD. They will continue to titrate at home with pulse ox.     CAD (coronary artery disease)  - history noted   - continue statin, plavix    Acute cystitis without hematuria  - patient with reports of dysuria and dark urine noted at bedside  - UA infectious appearing, but with 23 squams  - urine culture with E Fecalis, treated for 5 day course with Zosyn      Hyponatremia  Hyponatremia is likely due to Heart Failure. The patient's most recent sodium results are listed below.  Recent Labs     11/18/24  0233 11/19/24  0447 11/20/24  0732   * 129* 131*       Plan  - Correct the sodium by 4-6mEq in 24 hours.   - Obtain the following studies: Urine sodium, urine osmolality, serum osmolality or TSH, T4.  - Will treat the hyponatremia with diuresis  - Monitor sodium Daily.   - Patient hyponatremia is stable    COPD (chronic obstructive pulmonary disease)  Patient's COPD is controlled currently.  Patient is currently off COPD Pathway. Continue scheduled inhalers Supplemental oxygen and monitor respiratory status closely.     - prn duonebs  - titrate O2 to 88-92%, discussed with family    Aortic stenosis  Echocardiogram with evidence of aortic stenosis.The patient's most recent echocardiogram result is listed below. We will manage the valvular abnormality by monitoring.     Echo    Result Date: 11/14/2024    Left Ventricle: The left ventricle is normal in size. Normal wall   thickness. There is normal systolic function with a visually estimated   ejection fraction of 60 - 65%. Ejection fraction is approximately 65%.    Right Ventricle: Normal right ventricular cavity size. Wall thickness   is normal. Systolic function is normal.    Left Atrium: Left atrium is moderately dilated.    Right Atrium: Right atrium is moderately dilated.    Aortic Valve:  There is a transcatheter valve replacement in the aortic   position. It is reported to be a 26 mm Phan Suzi valve. Mildly   calcified cusps. Aortic valve area by VTI is 0.8 cm2. Aortic valve peak   velocity is 2.9 m/s. Mean gradient is 21.8 mmHg. The dimensionless index   is 0.25.  msand AcT/ET ratio is 0.36. Findings and trend from prior   echocardiography evaluation is suggestive of prosthetic valve stenosis.   Clinical correlation is required.    Mitral Valve: There is moderate mitral annular calcification present.    Tricuspid Valve: PISA radius is 0.6cm and Vena Contracta is 0.7cm   consistent with moderate to severe regurgitation.    Pulmonary Artery: There is moderate to severe pulmonary hypertension.   The estimated pulmonary artery systolic pressure is 62 mmHg.    IVC/SVC: Elevated venous pressure at 15 mmHg.    Pericardium: There is a trivial circumferential effusion.        Echo    Result Date: 11/11/2024    Left Ventricle: The left ventricle is normal in size. Normal wall   thickness. There is normal systolic function. Ejection fraction is   approximately 65%. There is normal diastolic function.    Right Ventricle: Normal right ventricular cavity size. Wall thickness   is normal. Systolic function is normal. Pacemaker lead present in the   ventricle.    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 It is reported to be a 26 mm Phan valve. The leaflets appear   mildly thickened. Aortic valve area by VTI is 0.7 cm2. LVOT diameter is   1.9 cm. Aortic valve peak velocity is 3.3 m/s. Mean gradient is 33.3 mmHg.   The dimensionless index is 0.26. Mild paravalvular regurgitation.    Mitral Valve: There is moderate mitral annular calcification present.    Tricuspid Valve: There is moderate regurgitation.    Pulmonary Artery: There is pulmonary hypertension. The estimated   pulmonary artery systolic pressure is 62 mmHg.    IVC/SVC: Intermediate venous pressure at  8 mmHg.        Echo    Result Date: 9/25/2024    Left Ventricle: The left ventricle is normal in size. Normal wall   thickness. There is concentric remodeling. There is normal systolic   function with a visually estimated ejection fraction of 60 - 65%. Grade   III diastolic dysfunction. Elevated left ventricular filling pressure.    Right Ventricle: Normal right ventricular cavity size. Wall thickness   is normal. Systolic function is normal. Pacemaker lead present in the   ventricle.    The left atrium is severely dilated.    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 It is reported to be a 26mm Phan Suzi S3 valve. Aortic   valve area by VTI is 1.90 cm2. Aortic valve peak velocity is 1.64 m/s.   Mean gradient is 6 mmHg. The dimensionless index is 0.57. There is mild   aortic regurgitation.    Tricuspid Valve: There is mild regurgitation.    Pulmonary Artery: The estimated pulmonary artery systolic pressure is   48 mmHg.    IVC/SVC: Elevated venous pressure at 15 mmHg.    Pericardium: There is a small posterior effusion. No indication of   cardiac tamponade.        Echo    Result Date: 2/10/2024    Left Ventricle: The left ventricle is normal in size. Normal wall   thickness. There is concentric remodeling. Regional wall motion   abnormalities present. There is moderately reduced systolic function with   a visually estimated ejection fraction of 35 - 40%. Ejection fraction by   visual approximation is 35%. There is normal diastolic function.    Right Ventricle: Normal right ventricular cavity size. Wall thickness   is normal. Right ventricle wall motion  is normal. Systolic function is   normal. Pacemaker lead present in the ventricle.    Left Atrium: Left atrium is moderately dilated.    Right Atrium: Right atrium is mildly dilated. Lead present in the right   atrium.    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 Aortic valve area  by VTI is 1.03 cm². Aortic valve peak velocity   is 2.07 m/s. Mean gradient is 11 mmHg. The dimensionless index is 0.30.   There is trace aortic regurgitation.    Mitral Valve: There is mild mitral annular calcification present.    Tricuspid Valve: There is mild regurgitation.    Pulmonary Artery: There is mild to moderate pulmonary hypertension. The   estimated pulmonary artery systolic pressure is 47 mmHg.    IVC/SVC: Normal venous pressure at 3 mmHg.        - Continue judicious diuresis given forward-flow dependence for aortic valve stenosis  - Will need OP follow up after hospitalization to discuss post-TAVR aortic valve stenosis    Permanent atrial fibrillation  Patient has permanent atrial fibrillation. Patient is currently in sinus rhythm. [id number] Score: 5. The patients heart rate in the last 24 hours is as follows:  Pulse  Min: 59  Max: 65     Antiarrhythmics       Anticoagulants  apixaban tablet 5 mg, 2 times daily, Oral    Plan  - Replete lytes with a goal of K>4, Mg >2  - Patient is anticoagulated, see medications listed above.  - Patient's afib is currently controlled        Essential hypertension  Patients blood pressure range in the last 24 hours was: BP  Min: 95/46  Max: 221/86.The patient's inpatient anti-hypertensive regimen is listed below:  Current Antihypertensives  isosorbide mononitrate 24 hr tablet 60 mg, Daily, Oral  ranolazine 12 hr tablet 1,000 mg, 2 times daily, Oral  spironolactone tablet 25 mg, Daily, Oral  hydrALAZINE injection 10 mg, Every 6 hours PRN, Intravenous  carvediloL tablet 12.5 mg, 2 times daily with meals, Oral  losartan tablet 50 mg, Daily, Oral  furosemide tablet 80 mg, 2 times daily, Oral    Plan  - BP is uncontrolled, will adjust as follows: can consider increasing carvedilol if HR can tolerate   - states was previously on losartan, but recently discontinued. Consider resuming if clinically necessary.    Gastroesophageal reflux disease  - continue home PPI      HLD  (hyperlipidemia)  - continue home statin      Type 2 diabetes mellitus  Patient's FSGs are controlled on current medication regimen.  Last A1c reviewed-   Lab Results   Component Value Date    HGBA1C 5.2 11/10/2024     Most recent fingerstick glucose reviewed-   Recent Labs   Lab 11/20/24  0838 11/20/24  1155 11/20/24  1504 11/20/24  1947   POCTGLUCOSE 111* 149* 160* 113*       Current correctional scale  Low  Maintain anti-hyperglycemic dose as follows-   Antihyperglycemics (From admission, onward)      Start     Stop Route Frequency Ordered    11/10/24 1513  insulin aspart U-100 pen 0-5 Units         -- SubQ Before meals & nightly PRN 11/10/24 1414          Hold Oral hypoglycemics while patient is in the hospital.      VTE Risk Mitigation (From admission, onward)           Ordered     apixaban tablet 5 mg  2 times daily         11/11/24 1535     IP VTE HIGH RISK PATIENT  Once         11/10/24 1414     Reason for No Pharmacological VTE Prophylaxis  Once        Question:  Reasons:  Answer:  Already adequately anticoagulated on oral Anticoagulants    11/10/24 1414     Place sequential compression device  Until discontinued         11/10/24 1414                    Discharge Planning   MIMI: 11/21/2024     Code Status: Full Code   Is the patient medically ready for discharge?:     Reason for patient still in hospital: Treatment  Discharge Plan A: Home Health   Discharge Delays: None known at this time      Ellie Lincoln MD  Department of Hospital Medicine   [facility] ([place] [id number])

## 2024-11-21 NOTE — PROGRESS NOTES
[facility] ([place] [address])  [facility]  Progress Note    Patient Name: Adriana Strong  MRN: [id number]  Patient Class: IP- Inpatient   Admission Date: 11/10/2024  Length of Stay: 11 days  Attending Physician: Clair Macias MD  Primary Care Provider: Krzysztof Mcgraw MD        Subjective:     Principal Problem:Acute on chronic diastolic congestive heart failure        HPI:  Adriana Strong is a 79 y.o.F with hx of diastolic HF, Afib on elqiuis, s/p pacemaker, HLD, HTN, non obstructive CAD, s/p TAVR, asthma, COPD, on home oxygen (2L via NC),  DM, CVA, CHF, AF and PPM with upgrade to CRT P in May 2024, history of inferior mesenteric revascularization with a 5 mm bare metal stent.  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 Patient reports SOB at rest and with exertion. States she saw cardiology last week where labs were performed. She was notified that her sodium was low and MD requested that patient hold her lasix x3d, continue spironolactone and after the 3d start lasix 40mg daily and additional prn. Otherwise, patient reports compliance with medications, fluid restriction, Na restriction. Also endorsing dysuria for the last couple of days as well as decreased oral intake. Denies fever, chills, chest pain, palpitations, abdominal pain, n/v/d, headaches, or any other symptoms at this time.     In ED: AF, hypertensive, tachypneic on 2-4L via NC. CBC with chronic, stable anemia. Na 128, K 5.6, otherwise CMP unremarkable. Mag 1.5. . Trop negative. TSH 2.559. POC lactate 1.96. UA with 3+ protein, 1+ leukocyte esterase, 17 WBCs, many bacteria, 23 squams. Culture pending. POC US in ED with trace pericardial effusion. CXR with left pleural effusion and congestive change/edema, developing left lower lung zone consolidation not excluded in this hypoventilatory exam. S/p  lasix 80mg inj x2 in ED. Admitted to  for further evaluation    Overview/Hospital Course:  S/p MICU admission for rescue BiPAP, diuresis, and UTI/CAP treatment weaned back to 2L prior to step down. Physical exam without obvious hypervolemia, but pt demonstrated JVD and Echo 11/14 with CVP 15. Continued to use BiPAP QHS and with long naps. Does not have a machine at home, but could benefit from OP Sleep Medicine evaluation. PT/OT evaluation recs for moderate-intensity therapy, but family reports significant family support and motivation to do therapy at home. Continues to be hypervolemic, continuing IV lasix.    11/21-  care for pt at home and dispenses medications.   - BP  126/58 Pulse 59   SpO2 100%   on IV diuresis. Lasix 80 mg iv q BB. Lorsatan, isosorbide, ranolazine, and spironolactone. And apixaban and plavix. Jardiance was stopped for uncertain reasons. Consider convert to po lasix in am     Interval History: see above    Review of Systems   Constitutional:  Positive for activity change. Negative for fever.   HENT:  Negative for trouble swallowing.    Respiratory:  Positive for shortness of breath. Negative for cough and choking.    Cardiovascular:  Negative for chest pain and leg swelling.   Gastrointestinal:  Negative for constipation, diarrhea and nausea.   Genitourinary:  Negative for difficulty urinating.   Musculoskeletal:  Negative for arthralgias and back pain.   Neurological:  Negative for numbness and headaches.   Psychiatric/Behavioral:  Negative for behavioral problems.      Objective:     Vital Signs (Most Recent):  Temp: 98 °F (36.7 °C) (11/21/24 1059)  Pulse: (!) 59 (11/21/24 1059)  Resp: 16 (11/21/24 1059)  BP: (!) 126/58 (11/21/24 1059)  SpO2: 100 % (11/21/24 1059) Vital Signs (24h Range):  Temp:  [97.4 °F (36.3 °C)-98.3 °F (36.8 °C)] 98 °F (36.7 °C)  Pulse:  [59-86] 59  Resp:  [16-25] 16  SpO2:  [96 %-100 %] 100 %  BP: (126-183)/(58-79) 126/58     Weight: 93.6 kg (206 lb 5.6  oz)  Body mass index is 35.42 kg/m².    Intake/Output Summary (Last 24 hours) at 11/21/2024 1210  Last data filed at 11/21/2024 0833  Gross per 24 hour   Intake 730 ml   Output 1750 ml   Net -1020 ml      Physical Exam  Constitutional:       General: She is not in acute distress.     Appearance: Normal appearance. She is obese. She is not ill-appearing, toxic-appearing or diaphoretic.   HENT:      Head: Normocephalic and atraumatic.      Nose: Nose normal.      Mouth/Throat:      Mouth: Mucous membranes are moist.      Pharynx: Oropharynx is clear.   Eyes:      General: No scleral icterus.     Extraocular Movements: Extraocular movements intact.      Conjunctiva/sclera: Conjunctivae normal.      Pupils: Pupils are equal, round, and reactive to light.   Cardiovascular:      Rate and Rhythm: Normal rate and regular rhythm.      Pulses: Normal pulses.      Heart sounds: No murmur heard.  Pulmonary:      Effort: Pulmonary effort is normal. No respiratory distress.      Breath sounds: No stridor. Rales (improving) present. No wheezing or rhonchi.   Chest:      Chest wall: No tenderness.   Abdominal:      General: Abdomen is flat. Bowel sounds are normal. There is no distension.      Palpations: Abdomen is soft.      Tenderness: There is no abdominal tenderness. There is no right CVA tenderness, guarding or rebound.   Musculoskeletal:         General: No swelling, tenderness, deformity or signs of injury. Normal range of motion.      Cervical back: Normal range of motion and neck supple. No rigidity or tenderness.      Right lower leg: No edema.      Left lower leg: No edema.   Skin:     General: Skin is warm and dry.      Coloration: Skin is not jaundiced.      Findings: No erythema or rash.   Neurological:      General: No focal deficit present.      Mental Status: She is alert and oriented to person, place, and time. Mental status is at baseline.      Motor: No weakness.   Psychiatric:         Mood and Affect: Mood  "normal.         Behavior: Behavior normal.         Thought Content: Thought content normal.         Judgment: Judgment normal.     Computed MELD 3.0 unavailable. One or more values for this score either were not found within the given timeframe or did not fit some other criterion.  Computed MELD-Na unavailable. One or more values for this score either were not found within the given timeframe or did not fit some other criterion.      Significant Labs:  CBC:  Recent Labs   Lab 11/20/24  0732 11/21/24  0348   WBC 7.28 4.74   HGB 8.7* 8.2*   HCT 26.0* 26.1*    301     CMP:  Recent Labs   Lab 11/20/24  0732 11/21/24  0348   * 133*   K 4.6 4.0   CL 87* 88*   CO2 35* 37*    92   BUN 28* 24*   CREATININE 0.8 0.7   CALCIUM 9.0 8.9   PROT 6.2 5.9*   ALBUMIN 2.4* 2.3*   BILITOT 0.4 0.3   ALKPHOS 37* 37*   AST 17 16   ALT 13 9*   ANIONGAP 9 8         Assessment/Plan:      * Acute on chronic diastolic congestive heart failure  Patient has Diastolic (HFpEF) heart failure that is Acute on chronic. On presentation their CHF was decompensated. Evidence of decompensated CHF on presentation includes: edema, elevated JVD, crackles on lung auscultation, weight gain, orthopnea, dyspnea on exertion (WHIT[place]), and shortness of breath. The etiology of their decompensation is likely medication adjustments . Patient is on the HF pathway. Most recent BNP and echo results are listed below.  No results for input(s): "BNP" in the last 72 hours.    Latest ECHO  Results for orders placed during the hospital encounter of 09/24/24    Echo    Interpretation Summary    Left Ventricle: The left ventricle is normal in size. Normal wall thickness. There is concentric remodeling. There is normal systolic function with a visually estimated ejection fraction of 60 - 65%. Grade III diastolic dysfunction. Elevated left ventricular filling pressure.    Right Ventricle: Normal right ventricular cavity size. Wall thickness is normal. Systolic " function is normal. Pacemaker lead present in the ventricle.    The left atrium is severely dilated.    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 It is reported to be a 26mm Phan Suzi S3 valve. Aortic valve area by VTI is 1.90 cm2. Aortic valve peak velocity is 1.64 m/s. Mean gradient is 6 mmHg. The dimensionless index is 0.57. There is mild aortic regurgitation.    Tricuspid Valve: There is mild regurgitation.    Pulmonary Artery: The estimated pulmonary artery systolic pressure is 48 mmHg.    IVC/SVC: Elevated venous pressure at 15 mmHg.    Pericardium: There is a small posterior effusion. No indication of cardiac tamponade.    Current Heart Failure Medications  spironolactone tablet 25 mg, Daily, Oral  hydrALAZINE injection 10 mg, Every 6 hours PRN, Intravenous  carvediloL tablet 12.5 mg, 2 times daily with meals, Oral  losartan tablet 50 mg, Daily, Oral  furosemide injection 80 mg, Every 12 hours, Intravenous  - Place on telemetry  - Place on fluid restriction of 1.5 L.   - Cardiology has not been consulted  - Continue IV lasix. Patient still hypervolemic on physical exam, although improved from prior.   - strict intake and output, daily standing weights    Intake/Output Summary (Last 24 hours) at 11/21/2024 1213  Last data filed at 11/21/2024 0833  Gross per 24 hour   Intake 730 ml   Output 1750 ml   Net -1020 ml       11/21- Lasix 80 mg iv q BB. Lorsatan, isosorbide, ranolazine, and spironolactone. And apixaban and plavix.   -consider adding jardiance      Acute on chronic respiratory failure with hypoxia  Patient with Hypoxic Respiratory failure which is Acute on chronic.  she is on home oxygen at 2 LPM. Supplemental oxygen was provided and noted- Oxygen Concentration (%):  [30] 30  Signs/symptoms of respiratory failure include- tachypnea and increased work of breathing. Contributing diagnoses includes - CHF Labs and images were reviewed. Patient Has not had a  recent ABG. Will treat underlying causes and adjust management of respiratory failure as follows- diuresis  - Discussed with family that goal O2 should be 88-92% given COPD. They will continue to titrate at home with pulse ox.   SEE above      Pleural effusion  Patient found to have small pleural effusion on imaging. I have personally reviewed and interpreted the following imaging: CT. A thoracentesis was performed. Pleural fluid was sent for analysis. Labs reviewed, including  . Fluid most consistent with Transudate.  . Most likely etiology includes Congestive Heart Failure. Management to include Diuresis      Aortic stenosis  Echocardiogram with evidence of aortic stenosis.The patient's most recent echocardiogram result is listed below. We will manage the valvular abnormality by monitoring.     Echo    Result Date: 11/14/2024    Left Ventricle: The left ventricle is normal in size. Normal wall   thickness. There is normal systolic function with a visually estimated   ejection fraction of 60 - 65%. Ejection fraction is approximately 65%.    Right Ventricle: Normal right ventricular cavity size. Wall thickness   is normal. Systolic function is normal.    Left Atrium: Left atrium is moderately dilated.    Right Atrium: Right atrium is moderately dilated.    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 It is reported to be a 26 mm Phan Suzi valve. Mildly   calcified cusps. Aortic valve area by VTI is 0.8 cm2. Aortic valve peak   velocity is 2.9 m/s. Mean gradient is 21.8 mmHg. The dimensionless index   is 0.25.  msand AcT/ET ratio is 0.36. Findings and trend from prior   echocardiography evaluation is suggestive of prosthetic valve stenosis.   Clinical correlation is required.    Mitral Valve: There is moderate mitral annular calcification present.    Tricuspid Valve: PISA radius is 0.6cm and Vena Contracta is 0.7cm   consistent with moderate to severe regurgitation.     Pulmonary Artery: There is moderate to severe pulmonary hypertension.   The estimated pulmonary artery systolic pressure is 62 mmHg.    IVC/SVC: Elevated venous pressure at 15 mmHg.    Pericardium: There is a trivial circumferential effusion.        Echo    Result Date: 11/11/2024    Left Ventricle: The left ventricle is normal in size. Normal wall   thickness. There is normal systolic function. Ejection fraction is   approximately 65%. There is normal diastolic function.    Right Ventricle: Normal right ventricular cavity size. Wall thickness   is normal. Systolic function is normal. Pacemaker lead present in the   ventricle.    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 It is reported to be a 26 mm Phan valve. The leaflets appear   mildly thickened. Aortic valve area by VTI is 0.7 cm2. LVOT diameter is   1.9 cm. Aortic valve peak velocity is 3.3 m/s. Mean gradient is 33.3 mmHg.   The dimensionless index is 0.26. Mild paravalvular regurgitation.    Mitral Valve: There is moderate mitral annular calcification present.    Tricuspid Valve: There is moderate regurgitation.    Pulmonary Artery: There is pulmonary hypertension. The estimated   pulmonary artery systolic pressure is 62 mmHg.    IVC/SVC: Intermediate venous pressure at 8 mmHg.        Echo    Result Date: 9/25/2024    Left Ventricle: The left ventricle is normal in size. Normal wall   thickness. There is concentric remodeling. There is normal systolic   function with a visually estimated ejection fraction of 60 - 65%. Grade   III diastolic dysfunction. Elevated left ventricular filling pressure.    Right Ventricle: Normal right ventricular cavity size. Wall thickness   is normal. Systolic function is normal. Pacemaker lead present in the   ventricle.    The left atrium is severely dilated.    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 It is reported to be a 26mm Phan Suzi  S3 valve. Aortic   valve area by VTI is 1.90 cm2. Aortic valve peak velocity is 1.64 m/s.   Mean gradient is 6 mmHg. The dimensionless index is 0.57. There is mild   aortic regurgitation.    Tricuspid Valve: There is mild regurgitation.    Pulmonary Artery: The estimated pulmonary artery systolic pressure is   48 mmHg.    IVC/SVC: Elevated venous pressure at 15 mmHg.    Pericardium: There is a small posterior effusion. No indication of   cardiac tamponade.        Echo    Result Date: 2/10/2024    Left Ventricle: The left ventricle is normal in size. Normal wall   thickness. There is concentric remodeling. Regional wall motion   abnormalities present. There is moderately reduced systolic function with   a visually estimated ejection fraction of 35 - 40%. Ejection fraction by   visual approximation is 35%. There is normal diastolic function.    Right Ventricle: Normal right ventricular cavity size. Wall thickness   is normal. Right ventricle wall motion  is normal. Systolic function is   normal. Pacemaker lead present in the ventricle.    Left Atrium: Left atrium is moderately dilated.    Right Atrium: Right atrium is mildly dilated. Lead present in the right   atrium.    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 Aortic valve area by VTI is 1.03 cm². Aortic valve peak velocity   is 2.07 m/s. Mean gradient is 11 mmHg. The dimensionless index is 0.30.   There is trace aortic regurgitation.    Mitral Valve: There is mild mitral annular calcification present.    Tricuspid Valve: There is mild regurgitation.    Pulmonary Artery: There is mild to moderate pulmonary hypertension. The   estimated pulmonary artery systolic pressure is 47 mmHg.    IVC/SVC: Normal venous pressure at 3 mmHg.        - Continue judicious diuresis given forward-flow dependence for aortic valve stenosis  - Will need OP follow up after hospitalization to discuss post-TAVR aortic valve stenosis    Permanent  atrial fibrillation  Patient has permanent atrial fibrillation. Patient is currently in sinus rhythm. [id number] Score: 5. The patients heart rate in the last 24 hours is as follows:  Pulse  Min: 59  Max: 86   Antiarrhythmics:  Coreg 12.5 bid     Anticoagulants  apixaban tablet 5 mg, 2 times daily, Oral    Plan  - Replete lytes with a goal of K>4, Mg >2  - Patient is anticoagulated, see medications listed above.  - Patient's afib is currently controlled        Anemia  Anemia is likely due to Iron deficiency. Most recent hemoglobin and hematocrit are listed below.  Recent Labs     11/19/24  0447 11/20/24  0732 11/21/24  0348   HGB 7.7* 8.7* 8.2*   HCT 24.2* 26.0* 26.1*       Plan  - Monitor serial CBC: Daily  - Transfuse PRBC if patient becomes hemodynamically unstable, symptomatic or H/H drops below 7/21.  - Patient has not received any PRBC transfusions to date  - Patient's anemia is currently stable    Essential hypertension  Patients blood pressure range in the last 24 hours was: BP  Min: 95/46  Max: 221/86.The patient's inpatient anti-hypertensive regimen is listed below:  Current Antihypertensives  isosorbide mononitrate 24 hr tablet 60 mg, Daily, Oral  ranolazine 12 hr tablet 1,000 mg, 2 times daily, Oral  spironolactone tablet 25 mg, Daily, Oral  hydrALAZINE injection 10 mg, Every 6 hours PRN, Intravenous  carvediloL tablet 12.5 mg, 2 times daily with meals, Oral  losartan tablet 50 mg, Daily, Oral  furosemide injection 80 mg, Every 12 hours, Intravenous    Plan  - BP is uncontrolled, will adjust as follows: can consider increasing carvedilol if HR can tolerate   - states was previously on losartan, but recently discontinued. Consider resuming if clinically necessary.    Type 2 diabetes mellitus  Patient's FSGs are controlled on current medication regimen.  Last A1c reviewed-   Lab Results   Component Value Date    HGBA1C 5.2 11/10/2024     Most recent fingerstick glucose reviewed-   Recent Labs   Lab  11/20/24  1504 11/20/24  1947 11/21/24  0729 11/21/24  1058   POCTGLUCOSE 160* 113* 104 185*       Current correctional scale  Low  Maintain anti-hyperglycemic dose as follows-   Antihyperglycemics (From admission, onward)      Start     Stop Route Frequency Ordered    11/10/24 1513  insulin aspart U-100 pen 0-5 Units         -- SubQ Before meals & nightly PRN 11/10/24 1414          Hold Oral hypoglycemics while patient is in the hospital.    Acute cystitis without hematuria  - patient with reports of dysuria and dark urine noted at bedside  - UA infectious appearing, but with 23 squams  - urine culture with E Fecalis, treated for 5 day course with Zosyn      CAD (coronary artery disease)  - history noted   - continue statin, plavix    COPD (chronic obstructive pulmonary disease)  Patient's COPD is controlled currently.  Patient is currently off COPD Pathway. Continue scheduled inhalers Supplemental oxygen and monitor respiratory status closely.     - prn duonebs  - titrate O2 to 88-92%, discussed with family    Gastroesophageal reflux disease  - continue home PPI      Hyponatremia  Hyponatremia is likely due to Heart Failure. The patient's most recent sodium results are listed below.  Recent Labs     11/19/24  0447 11/20/24  0732 11/21/24  0348   * 131* 133*       Plan  - Correct the sodium by 4-6mEq in 24 hours.   - Obtain the following studies: Urine sodium, urine osmolality, serum osmolality or TSH, T4.  - Will treat the hyponatremia with diuresis  - Monitor sodium Daily.   - Patient hyponatremia is stable    Anxiety  - home Lorazepam      HLD (hyperlipidemia)  - continue home statin      Irritant contact dermatitis due to friction or contact with body fluids, unspecified  Sacral wound with appreciated recs by Wound Care      Hypomagnesemia  Patient has Abnormal Magnesium: hypomagnesemia. Will continue to monitor electrolytes closely. Will replace the affected electrolytes and repeat labs to be done after  interventions completed. The patient's magnesium results have been reviewed and are listed below.  Recent Labs   Lab 11/10/24  1200   MG 1.5*        Hyperkalemia  The patients most recent potassium results are listed below.  Recent Labs     11/08/24  1015 11/10/24  1200   K 4.7 5.6*     Plan  - Monitor for arrhythmias with EKG and/or continuous telemetry.   - Treat the hyperkalemia with Potassium Binders.   - Monitor potassium: Daily  - The patient's hyperkalemia is stable

## 2024-11-21 NOTE — ASSESSMENT & PLAN NOTE
Patient has permanent atrial fibrillation. Patient is currently in sinus rhythm. [id number] Score: 5. The patients heart rate in the last 24 hours is as follows:  Pulse  Min: 59  Max: 86   Antiarrhythmics:  Coreg 12.5 bid     Anticoagulants  apixaban tablet 5 mg, 2 times daily, Oral    Plan  - Replete lytes with a goal of K>4, Mg >2  - Patient is anticoagulated, see medications listed above.  - Patient's afib is currently controlled

## 2024-11-21 NOTE — PLAN OF CARE
[facility] - [facility] ([place]  Discharge Reassessment    Primary Care Provider: Krzysztof Mcgraw MD    Expected Discharge Date: 11/22/2024    Reassessment (most recent)       Discharge Reassessment - 11/21/24 1505          Discharge Reassessment    Assessment Type Discharge Planning Reassessment     Did the patient's condition or plan change since previous assessment? No     Discharge Plan discussed with: Patient;Spouse/sig other     Name(s) and Number(s) Anthony Strong (Spouse)  [phone]     Communicated MIMI with patient/caregiver Yes     Discharge Plan A Home Health     Discharge Plan B Home with family     DME Needed Upon Discharge  none     Transition of Care Barriers None     Why the patient remains in the hospital Requires continued medical care        Post-Acute Status    Post-Acute Authorization Home Health     Home Health Status Set-up Complete/Auth obtained     Discharge Delays None known at this time                       Discharge Plan A and Plan B have been determined by review of patient's clinical status, future medical and therapeutic needs, and coverage/benefits for post-acute care in coordination with multidisciplinary team members.    Adrianna Archuleta MSW, CSW

## 2024-11-21 NOTE — ASSESSMENT & PLAN NOTE
"Patient has Diastolic (HFpEF) heart failure that is Acute on chronic. On presentation their CHF was decompensated. Evidence of decompensated CHF on presentation includes: edema, elevated JVD, crackles on lung auscultation, weight gain, orthopnea, dyspnea on exertion (WHITLEY), and shortness of breath. The etiology of their decompensation is likely medication adjustments . Patient is on the HF pathway. Most recent BNP and echo results are listed below.  No results for input(s): "BNP" in the last 72 hours.    Latest ECHO  Results for orders placed during the hospital encounter of 09/24/24    Echo    Interpretation Summary    Left Ventricle: The left ventricle is normal in size. Normal wall thickness. There is concentric remodeling. There is normal systolic function with a visually estimated ejection fraction of 60 - 65%. Grade III diastolic dysfunction. Elevated left ventricular filling pressure.    Right Ventricle: Normal right ventricular cavity size. Wall thickness is normal. Systolic function is normal. Pacemaker lead present in the ventricle.    The left atrium is severely dilated.    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 It is reported to be a 26mm Phan Suzi S3 valve. Aortic valve area by VTI is 1.90 cm2. Aortic valve peak velocity is 1.64 m/s. Mean gradient is 6 mmHg. The dimensionless index is 0.57. There is mild aortic regurgitation.    Tricuspid Valve: There is mild regurgitation.    Pulmonary Artery: The estimated pulmonary artery systolic pressure is 48 mmHg.    IVC/SVC: Elevated venous pressure at 15 mmHg.    Pericardium: There is a small posterior effusion. No indication of cardiac tamponade.    Current Heart Failure Medications  spironolactone tablet 25 mg, Daily, Oral  hydrALAZINE injection 10 mg, Every 6 hours PRN, Intravenous  carvediloL tablet 12.5 mg, 2 times daily with meals, Oral  losartan tablet 50 mg, Daily, Oral  furosemide injection 80 mg, Every 12 " hours, Intravenous  - Place on telemetry  - Place on fluid restriction of 1.5 L.   - Cardiology has not been consulted  - Continue IV lasix. Patient still hypervolemic on physical exam, although improved from prior.   - strict intake and output, daily standing weights    Intake/Output Summary (Last 24 hours) at 11/21/2024 1213  Last data filed at 11/21/2024 0833  Gross per 24 hour   Intake 730 ml   Output 1750 ml   Net -1020 ml       11/21- Lasix 80 mg iv q BB. Lorsatan, isosorbide, ranolazine, and spironolactone. And apixaban and plavix.   -consider adding jardiance

## 2024-11-21 NOTE — SUBJECTIVE & OBJECTIVE
Interval History: NAEON. Volume status improving although still hypervolemic. Crackles to mid lungs today, improved from prior. JVD down to mid-lower neck. Cardiorenal TACOS now resolved. Likely needs another day or two of IV diuresis then can transition to PO.     Review of Systems   Constitutional:  Negative for activity change, appetite change and fever.   Respiratory:  Negative for cough, shortness of breath and wheezing.    Cardiovascular:  Negative for chest pain and leg swelling.   Gastrointestinal:  Negative for abdominal pain, constipation, diarrhea and vomiting.   Skin:  Negative for rash.   Neurological:  Negative for headaches.     Objective:     Vital Signs (Most Recent):  Temp: 98.1 °F (36.7 °C) (11/20/24 1930)  Pulse: 60 (11/20/24 1930)  Resp: 18 (11/20/24 1930)  BP: 134/63 (11/20/24 1930)  SpO2: 100 % (11/20/24 1930) Vital Signs (24h Range):  Temp:  [98 °F (36.7 °C)-98.8 °F (37.1 °C)] 98.1 °F (36.7 °C)  Pulse:  [59-65] 60  Resp:  [17-24] 18  SpO2:  [95 %-100 %] 100 %  BP: (110-161)/(47-67) 134/63     Weight: 95.2 kg (209 lb 14.1 oz)  Body mass index is 36.03 kg/m².    Intake/Output Summary (Last 24 hours) at 11/20/2024 2017  Last data filed at 11/20/2024 1954  Gross per 24 hour   Intake 960 ml   Output 1900 ml   Net -940 ml         Physical Exam  Vitals and nursing note reviewed.   HENT:      Head: Normocephalic and atraumatic.   Eyes:      Extraocular Movements: Extraocular movements intact.      Conjunctiva/sclera: Conjunctivae normal.   Neck:      Comments: JVD to mid-neck  Cardiovascular:      Rate and Rhythm: Normal rate and regular rhythm.      Pulses: Normal pulses.   Pulmonary:      Effort: Pulmonary effort is normal. No respiratory distress.      Breath sounds: Rales (to mid lungs, improved from prior) present. No wheezing.   Abdominal:      Palpations: Abdomen is soft.      Tenderness: There is abdominal tenderness (epigastric to deep palpation). There is no guarding.   Musculoskeletal:       Right lower leg: No edema.      Left lower leg: No edema.   Skin:     General: Skin is warm and dry.   Neurological:      General: No focal deficit present.      Mental Status: She is alert and oriented to person, place, and time.   Psychiatric:         Mood and Affect: Mood normal.             Significant Labs: All pertinent labs within the past 24 hours have been reviewed.  CBC:   Recent Labs   Lab 11/19/24 0447 11/20/24  0732   WBC 6.30 7.28   HGB 7.7* 8.7*   HCT 24.2* 26.0*    298     CMP:   Recent Labs   Lab 11/19/24 0447 11/20/24  0732   * 131*   K 4.3 4.6   CL 85* 87*   CO2 34* 35*   GLU 86 101   BUN 38* 28*   CREATININE 1.2 0.8   CALCIUM 9.0 9.0   PROT 6.0 6.2   ALBUMIN 2.3* 2.4*   BILITOT 0.4 0.4   ALKPHOS 36* 37*   AST 16 17   ALT 10 13   ANIONGAP 10 9       Significant Imaging: I have reviewed all pertinent imaging results/findings within the past 24 hours.

## 2024-11-21 NOTE — PT/OT/SLP PROGRESS
Physical Therapy Treatment  Co-treatment with OT due to acuity of condition, level of skilled assist needed for assessment of safety with mobility and potential of not tolerating a second treatment session.     Patient Name:  Adriana Strong   MRN:  [id number]    Recommendations:     Discharge Recommendations: Moderate Intensity Therapy  Discharge Equipment Recommendations: none  Barriers to discharge: Decreased caregiver support    Assessment:     Adriana Strong is a 79 y.o. female admitted with a medical diagnosis of Acute on chronic diastolic congestive heart failure.  She presents with the following impairments/functional limitations: weakness, impaired endurance, impaired self care skills, impaired functional mobility, decreased safety awareness, decreased lower extremity function, decreased upper extremity function, edema, impaired cardiopulmonary response to activity Pt tolerated treatment session well today. Pt still requiring assistance, yet showing improvements compared to last session. Pt was able to transfer to bed side chair. Once to chair pt began complaining of dizziness and onset of headache. Vitals taken and as follows: SpO2 (96-97%), BP: (135/63) pt reporting decrease in symptoms with seated rest. Rn notified. Patient remains appropriate for continued skilled services within the acute environment and goals remain appropriate.   .    Rehab Prognosis: Good; patient would benefit from acute skilled PT services to address these deficits and reach maximum level of function.    Recent Surgery: * No surgery found *      Plan:     During this hospitalization, patient to be seen 4 x/week to address the identified rehab impairments via gait training, therapeutic activities, therapeutic exercises, neuromuscular re-education and progress toward the following goals:    Plan of Care Expires:  12/12/24    Subjective     Chief Complaint: dizziness and headache   Patient/Family Comments/goals: pt agreeable to PT    Pain/Comfort:  Pain Rating 1: 0/10      Objective:     Communicated with Rn prior to session.  Patient found supine with oxygen, telemetry, PureWick, pulse ox (continuous) upon PT entry to room.     General Precautions: Standard, fall  Orthopedic Precautions: N/A  Braces: N/A  Respiratory Status: Nasal cannula     Functional Mobility:  Bed Mobility:     Supine to Sit: minimum assistance  EOB sitting: SBA     Transfers:     Sit to Stand:  contact guard assistance with rolling walker  Bed to Chair: close CGA with  rolling walker  using  Step Transfer  Gait: 4 ft to chair close CGA with RW   Pt ambulated with decrease dany and step length   No knee buckling noted during today's session     Pt performed 10 repetitions of seated B LE exercises consisting of: LAQ, AP, QS, hip ADD/ABD       AM-PAC 6 CLICK MOBILITY  Turning over in bed (including adjusting bedclothes, sheets and blankets)?: 3  Sitting down on and standing up from a chair with arms (e.g., wheelchair, bedside commode, etc.): 3  Moving from lying on back to sitting on the side of the bed?: 3  Moving to and from a bed to a chair (including a wheelchair)?: 3  Need to walk in hospital room?: 3  Climbing 3-5 steps with a railing?: 2  Basic Mobility Total Score: 17       Treatment & Education:  Therapist provided instruction and educated for safety during transfers and gait training. As well as proper body mechanics, energy conservation, and fall prevention strategies during tasks listed above, and the effects of prolonged immobility and the importance of performing EOB/OOB activity and exercises to promote healing and reduce recovery time.       Patient left up in chair with all lines intact, call button in reach, Rn notified, and family present..    GOALS:   Multidisciplinary Problems       Physical Therapy Goals          Problem: Physical Therapy    Goal Priority Disciplines Outcome Interventions   Physical Therapy Goal     PT, PT/OT Progressing     Description: Goals to be met by: 24     Patient will increase functional independence with mobility by performin. Supine to sit with Stand-by Assistance  2. Sit to stand transfer with Stand-by Assistance with RW.   3. Bed to chair transfer with Stand-by Assistance using Rolling Walker  4. Gait  x 100 feet with Stand-by Assistance using Rolling Walker.   5. Lower extremity exercise program x15 reps per handout, with assistance as needed to increase tolerance to activities.                          Time Tracking:     PT Received On: 24  PT Start Time: 937     PT Stop Time: 1003  PT Total Time (min): 26 min     Billable Minutes: Gait Training 12 and Therapeutic Exercise 12    Treatment Type: Treatment  PT/PTA: PTA     Number of PTA visits since last PT visit: 3     2024

## 2024-11-22 LAB
ALBUMIN SERPL BCP-MCNC: 2.4 G/DL (ref 3.5–5.2)
ALP SERPL-CCNC: 38 U/L (ref 40–150)
ALT SERPL W/O P-5'-P-CCNC: 11 U/L (ref 10–44)
ANION GAP SERPL CALC-SCNC: 10 MMOL/L (ref 8–16)
AST SERPL-CCNC: 18 U/L (ref 10–40)
BASOPHILS # BLD AUTO: 0.05 K/UL (ref 0–0.2)
BASOPHILS NFR BLD: 1 % (ref 0–1.9)
BILIRUB SERPL-MCNC: 0.3 MG/DL (ref 0.1–1)
BUN SERPL-MCNC: 21 MG/DL (ref 8–23)
CALCIUM SERPL-MCNC: 9 MG/DL (ref 8.7–10.5)
CHLORIDE SERPL-SCNC: 89 MMOL/L (ref 95–110)
CO2 SERPL-SCNC: 39 MMOL/L (ref 23–29)
CREAT SERPL-MCNC: 0.7 MG/DL (ref 0.5–1.4)
DIFFERENTIAL METHOD BLD: ABNORMAL
EOSINOPHIL # BLD AUTO: 0.1 K/UL (ref 0–0.5)
EOSINOPHIL NFR BLD: 1 % (ref 0–8)
ERYTHROCYTE [DISTWIDTH] IN BLOOD BY AUTOMATED COUNT: 18.2 % (ref 11.5–14.5)
EST. GFR  (NO RACE VARIABLE): >60 ML/MIN/1.73 M^2
GLUCOSE SERPL-MCNC: 93 MG/DL (ref 70–110)
HCT VFR BLD AUTO: 26.8 % (ref 37–48.5)
HGB BLD-MCNC: 8.5 G/DL (ref 12–16)
IMM GRANULOCYTES # BLD AUTO: 0.04 K/UL (ref 0–0.04)
IMM GRANULOCYTES NFR BLD AUTO: 0.8 % (ref 0–0.5)
LYMPHOCYTES # BLD AUTO: 1.1 K/UL (ref 1–4.8)
LYMPHOCYTES NFR BLD: 20.7 % (ref 18–48)
MAGNESIUM SERPL-MCNC: 1.5 MG/DL (ref 1.6–2.6)
MCH RBC QN AUTO: 26.4 PG (ref 27–31)
MCHC RBC AUTO-ENTMCNC: 31.7 G/DL (ref 32–36)
MCV RBC AUTO: 83 FL (ref 82–98)
MONOCYTES # BLD AUTO: 0.8 K/UL (ref 0.3–1)
MONOCYTES NFR BLD: 14.4 % (ref 4–15)
NEUTROPHILS # BLD AUTO: 3.3 K/UL (ref 1.8–7.7)
NEUTROPHILS NFR BLD: 62.1 % (ref 38–73)
NRBC BLD-RTO: 0 /100 WBC
OHS QRS DURATION: 96 MS
OHS QTC CALCULATION: 436 MS
PHOSPHATE SERPL-MCNC: 3.2 MG/DL (ref 2.7–4.5)
PLATELET # BLD AUTO: 325 K/UL (ref 150–450)
PMV BLD AUTO: 9.7 FL (ref 9.2–12.9)
POCT GLUCOSE: 117 MG/DL (ref 70–110)
POCT GLUCOSE: 125 MG/DL (ref 70–110)
POCT GLUCOSE: 127 MG/DL (ref 70–110)
POCT GLUCOSE: 201 MG/DL (ref 70–110)
POTASSIUM SERPL-SCNC: 4.1 MMOL/L (ref 3.5–5.1)
PROT SERPL-MCNC: 6 G/DL (ref 6–8.4)
RBC # BLD AUTO: 3.22 M/UL (ref 4–5.4)
SODIUM SERPL-SCNC: 138 MMOL/L (ref 136–145)
WBC # BLD AUTO: 5.26 K/UL (ref 3.9–12.7)

## 2024-11-22 PROCEDURE — 93010 ELECTROCARDIOGRAM REPORT: CPT | Mod: ,,, | Performed by: INTERNAL MEDICINE

## 2024-11-22 PROCEDURE — 93005 ELECTROCARDIOGRAM TRACING: CPT

## 2024-11-22 PROCEDURE — 85025 COMPLETE CBC W/AUTO DIFF WBC: CPT

## 2024-11-22 PROCEDURE — 80053 COMPREHEN METABOLIC PANEL: CPT

## 2024-11-22 PROCEDURE — 25000003 PHARM REV CODE 250

## 2024-11-22 PROCEDURE — 84100 ASSAY OF PHOSPHORUS: CPT

## 2024-11-22 PROCEDURE — 36415 COLL VENOUS BLD VENIPUNCTURE: CPT

## 2024-11-22 PROCEDURE — 94660 CPAP INITIATION&MGMT: CPT

## 2024-11-22 PROCEDURE — 25000003 PHARM REV CODE 250: Performed by: STUDENT IN AN ORGANIZED HEALTH CARE EDUCATION/TRAINING PROGRAM

## 2024-11-22 PROCEDURE — 94640 AIRWAY INHALATION TREATMENT: CPT

## 2024-11-22 PROCEDURE — 25000003 PHARM REV CODE 250: Performed by: INTERNAL MEDICINE

## 2024-11-22 PROCEDURE — 83735 ASSAY OF MAGNESIUM: CPT

## 2024-11-22 PROCEDURE — 25000003 PHARM REV CODE 250: Performed by: PHYSICIAN ASSISTANT

## 2024-11-22 PROCEDURE — 94761 N-INVAS EAR/PLS OXIMETRY MLT: CPT

## 2024-11-22 PROCEDURE — 27100171 HC OXYGEN HIGH FLOW UP TO 24 HOURS

## 2024-11-22 PROCEDURE — 99900035 HC TECH TIME PER 15 MIN (STAT)

## 2024-11-22 PROCEDURE — 25000003 PHARM REV CODE 250: Performed by: HOSPITALIST

## 2024-11-22 PROCEDURE — 21400001 HC TELEMETRY ROOM

## 2024-11-22 PROCEDURE — 25000242 PHARM REV CODE 250 ALT 637 W/ HCPCS

## 2024-11-22 PROCEDURE — 97110 THERAPEUTIC EXERCISES: CPT | Mod: CQ

## 2024-11-22 RX ORDER — LORAZEPAM/0.9% SODIUM CHLORIDE 100MG/0.1L
2 PLASTIC BAG, INJECTION (ML) INTRAVENOUS ONCE
Status: COMPLETED | OUTPATIENT
Start: 2024-11-22 | End: 2024-11-22

## 2024-11-22 RX ORDER — FUROSEMIDE 40 MG/1
40 TABLET ORAL EVERY 8 HOURS
Status: DISCONTINUED | OUTPATIENT
Start: 2024-11-22 | End: 2024-11-23 | Stop reason: HOSPADM

## 2024-11-22 RX ORDER — ACETAMINOPHEN 325 MG/1
650 TABLET ORAL EVERY 4 HOURS PRN
Status: DISCONTINUED | OUTPATIENT
Start: 2024-11-22 | End: 2024-11-23 | Stop reason: HOSPADM

## 2024-11-22 RX ORDER — FUROSEMIDE 40 MG/1
40 TABLET ORAL EVERY 8 HOURS
Status: DISCONTINUED | OUTPATIENT
Start: 2024-11-22 | End: 2024-11-22

## 2024-11-22 RX ADMIN — DULOXETINE HYDROCHLORIDE 20 MG: 20 CAPSULE, DELAYED RELEASE ORAL at 08:11

## 2024-11-22 RX ADMIN — APIXABAN 5 MG: 5 TABLET, FILM COATED ORAL at 08:11

## 2024-11-22 RX ADMIN — LORAZEPAM 0.5 MG: 0.5 TABLET ORAL at 05:11

## 2024-11-22 RX ADMIN — FUROSEMIDE 40 MG: 40 TABLET ORAL at 10:11

## 2024-11-22 RX ADMIN — ATORVASTATIN CALCIUM 10 MG: 10 TABLET, FILM COATED ORAL at 08:11

## 2024-11-22 RX ADMIN — PANTOPRAZOLE SODIUM 40 MG: 40 TABLET, DELAYED RELEASE ORAL at 08:11

## 2024-11-22 RX ADMIN — SENNOSIDES 8.6 MG: 8.6 TABLET, FILM COATED ORAL at 09:11

## 2024-11-22 RX ADMIN — CARVEDILOL 12.5 MG: 12.5 TABLET, FILM COATED ORAL at 05:11

## 2024-11-22 RX ADMIN — APIXABAN 5 MG: 5 TABLET, FILM COATED ORAL at 09:11

## 2024-11-22 RX ADMIN — MONTELUKAST 10 MG: 10 TABLET, FILM COATED ORAL at 08:11

## 2024-11-22 RX ADMIN — Medication: at 08:11

## 2024-11-22 RX ADMIN — RANOLAZINE 1000 MG: 500 TABLET, EXTENDED RELEASE ORAL at 09:11

## 2024-11-22 RX ADMIN — ISOSORBIDE MONONITRATE 60 MG: 60 TABLET, EXTENDED RELEASE ORAL at 08:11

## 2024-11-22 RX ADMIN — LORAZEPAM 0.5 MG: 0.5 TABLET ORAL at 07:11

## 2024-11-22 RX ADMIN — FUROSEMIDE 40 MG: 40 TABLET ORAL at 09:11

## 2024-11-22 RX ADMIN — ACETAMINOPHEN 650 MG: 325 TABLET ORAL at 07:11

## 2024-11-22 RX ADMIN — SPIRONOLACTONE 25 MG: 25 TABLET ORAL at 08:11

## 2024-11-22 RX ADMIN — ACETAMINOPHEN 650 MG: 325 TABLET ORAL at 05:11

## 2024-11-22 RX ADMIN — GABAPENTIN 300 MG: 300 CAPSULE ORAL at 09:11

## 2024-11-22 RX ADMIN — IPRATROPIUM BROMIDE AND ALBUTEROL SULFATE 3 ML: 2.5; .5 SOLUTION RESPIRATORY (INHALATION) at 08:11

## 2024-11-22 RX ADMIN — MAGNESIUM SULFATE IN WATER 2 G: 40 INJECTION, SOLUTION INTRAVENOUS at 10:11

## 2024-11-22 RX ADMIN — POLYETHYLENE GLYCOL 3350 17 G: 17 POWDER, FOR SOLUTION ORAL at 09:11

## 2024-11-22 RX ADMIN — OXYCODONE HYDROCHLORIDE AND ACETAMINOPHEN 1000 MG: 500 TABLET ORAL at 08:11

## 2024-11-22 RX ADMIN — CARVEDILOL 12.5 MG: 12.5 TABLET, FILM COATED ORAL at 08:11

## 2024-11-22 RX ADMIN — RANOLAZINE 1000 MG: 500 TABLET, EXTENDED RELEASE ORAL at 08:11

## 2024-11-22 RX ADMIN — CLOPIDOGREL BISULFATE 75 MG: 75 TABLET ORAL at 08:11

## 2024-11-22 RX ADMIN — LOSARTAN POTASSIUM 50 MG: 50 TABLET, FILM COATED ORAL at 08:11

## 2024-11-22 NOTE — ASSESSMENT & PLAN NOTE
"Patient has Diastolic (HFpEF) heart failure that is Acute on chronic. On presentation their CHF was decompensated. Evidence of decompensated CHF on presentation includes: edema, elevated JVD, crackles on lung auscultation, weight gain, orthopnea, dyspnea on exertion (WHITLEY), and shortness of breath. The etiology of their decompensation is likely medication adjustments . Patient is on the HF pathway. Most recent BNP and echo results are listed below.  No results for input(s): "BNP" in the last 72 hours.    Latest ECHO  Results for orders placed during the hospital encounter of 09/24/24    Echo    Interpretation Summary    Left Ventricle: The left ventricle is normal in size. Normal wall thickness. There is concentric remodeling. There is normal systolic function with a visually estimated ejection fraction of 60 - 65%. Grade III diastolic dysfunction. Elevated left ventricular filling pressure.    Right Ventricle: Normal right ventricular cavity size. Wall thickness is normal. Systolic function is normal. Pacemaker lead present in the ventricle.    The left atrium is severely dilated.    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 It is reported to be a 26mm Phan Suzi S3 valve. Aortic valve area by VTI is 1.90 cm2. Aortic valve peak velocity is 1.64 m/s. Mean gradient is 6 mmHg. The dimensionless index is 0.57. There is mild aortic regurgitation.    Tricuspid Valve: There is mild regurgitation.    Pulmonary Artery: The estimated pulmonary artery systolic pressure is 48 mmHg.    IVC/SVC: Elevated venous pressure at 15 mmHg.    Pericardium: There is a small posterior effusion. No indication of cardiac tamponade.    Current Heart Failure Medications  spironolactone tablet 25 mg, Daily, Oral  hydrALAZINE injection 10 mg, Every 6 hours PRN, Intravenous  carvediloL tablet 12.5 mg, 2 times daily with meals, Oral  losartan tablet 50 mg, Daily, Oral  furosemide tablet 40 mg, Every 8 " hours, Oral  - Place on telemetry  - Place on fluid restriction of 1.5 L.   - Cardiology has not been consulted  - Continue IV lasix. Patient still hypervolemic on physical exam, although improved from prior.   - strict intake and output, daily standing weights    Intake/Output Summary (Last 24 hours) at 11/22/2024 0834  Last data filed at 11/22/2024 0607  Gross per 24 hour   Intake 505 ml   Output 1300 ml   Net -795 ml       11/21- Lasix 80 mg iv q BB. Lorsatan, isosorbide, ranolazine, and spironolactone. And apixaban and plavix.   11/22- convert to po lasix 40 tid. Reviewed cardiology notes and it is unclear why jardiance was stopped. Will have pt f/u cardiology.

## 2024-11-22 NOTE — NURSING
Patient resting in bed at this time. Alert and oriented. Able to make needs known. No complaints currently. Daughter remains at bedside. Respirations even and unlabored. BiPAP in place, 30% FiO2, SpO2 within normal limits. Heart rate and rhythm regular. Generalized edema noted. Continues diuretic therapy. Abdomen soft, nontender, nondistended. Bowel sounds present. Purewick in place. PIV to LFA patent and flushing well. Vital signs remain stable at this time. No evidence of acute distress.

## 2024-11-22 NOTE — ASSESSMENT & PLAN NOTE
Anemia is likely due to Iron deficiency. Most recent hemoglobin and hematocrit are listed below.  Recent Labs     11/20/24  0732 11/21/24  0348 11/22/24  0217   HGB 8.7* 8.2* 8.5*   HCT 26.0* 26.1* 26.8*       Plan  - Monitor serial CBC: Daily  - Transfuse PRBC if patient becomes hemodynamically unstable, symptomatic or H/H drops below 7/21.  - Patient has not received any PRBC transfusions to date  - Patient's anemia is currently stable

## 2024-11-22 NOTE — PROGRESS NOTES
Pt has c/o headache and chest tightness. MD notified.    11/22/24 0715   Vital Signs   Temp 98 °F (36.7 °C)   Temp Source Oral   Pulse 60   Resp 20   SpO2 100 %   Flow (L/min) (Oxygen Therapy) 2   BP (!) 188/77

## 2024-11-22 NOTE — ASSESSMENT & PLAN NOTE
Patient has permanent atrial fibrillation. Patient is currently in sinus rhythm. [id number] Score: 5. The patients heart rate in the last 24 hours is as follows:  Pulse  Min: 59  Max: 60   Antiarrhythmics:  Coreg 12.5 bid     Anticoagulants  apixaban tablet 5 mg, 2 times daily, Oral    Plan  - Replete lytes with a goal of K>4, Mg >2  - Patient is anticoagulated, see medications listed above.  - Patient's afib is currently controlled    11/22- paced rhythm on EKG, rate 60.

## 2024-11-22 NOTE — PROGRESS NOTES
[facility] ([place] [address])  [facility] Medicine  Progress Note    Patient Name: Adriana Strong  MRN: [id number]  Patient Class: IP- Inpatient   Admission Date: 11/10/2024  Length of Stay: 12 days  Attending Physician: Clair Macias MD  Primary Care Provider: Krzysztof Mcgraw MD        Subjective:     Principal Problem:Acute on chronic diastolic congestive heart failure        HPI:  Adriana Strong is a 79 y.o.F with hx of diastolic HF, Afib on elqiuis, s/p pacemaker, HLD, HTN, non obstructive CAD, s/p TAVR, asthma, COPD, on home oxygen (2L via NC),  DM, CVA, CHF, AF and PPM with upgrade to CRT P in May 2024, history of inferior mesenteric revascularization with a 5 mm bare metal stent.  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 Patient reports SOB at rest and with exertion. States she saw cardiology last week where labs were performed. She was notified that her sodium was low and MD requested that patient hold her lasix x3d, continue spironolactone and after the 3d start lasix 40mg daily and additional prn. Otherwise, patient reports compliance with medications, fluid restriction, Na restriction. Also endorsing dysuria for the last couple of days as well as decreased oral intake. Denies fever, chills, chest pain, palpitations, abdominal pain, n/v/d, headaches, or any other symptoms at this time.     In ED: AF, hypertensive, tachypneic on 2-4L via NC. CBC with chronic, stable anemia. Na 128, K 5.6, otherwise CMP unremarkable. Mag 1.5. . Trop negative. TSH 2.559. POC lactate 1.96. UA with 3+ protein, 1+ leukocyte esterase, 17 WBCs, many bacteria, 23 squams. Culture pending. POC US in ED with trace pericardial effusion. CXR with left pleural effusion and congestive change/edema, developing left lower lung zone consolidation not excluded in this hypoventilatory exam. S/p  lasix 80mg inj x2 in ED. Admitted to  for further evaluation    Overview/Hospital Course:  S/p MICU admission for rescue BiPAP, diuresis, and UTI/CAP treatment weaned back to 2L prior to step down. Physical exam without obvious hypervolemia, but pt demonstrated JVD and Echo 11/14 with CVP 15. Continued to use BiPAP QHS and with long naps. Does not have a machine at home, but could benefit from OP Sleep Medicine evaluation. PT/OT evaluation recs for moderate-intensity therapy, but family reports significant family support and motivation to do therapy at home. Continues to be hypervolemic, continuing IV lasix.    11/21-  care for pt at home and dispenses medications.   - BP  126/58 Pulse 59   SpO2 100%   on IV diuresis. Lasix 80 mg iv q BB. Lorsatan, isosorbide, ranolazine, and spironolactone. And apixaban and plavix. Jardiance was stopped for uncertain reasons. Consider convert to po lasix in am     11/22- reporting HA and chest pain. Has dual paced rhythm, no changes on EKG. BP  188/77, Pulse 60 other VSS. Did not receive am meds yet. Mag  1.5- repleted.  UO= 1300. On lasix 80 IV bid. Her ususal home lasix dose ion 40 bid. Will convert to lasix 40 tid and monitor UO today.       Interval History: see above    Review of Systems   Constitutional:  Positive for activity change. Negative for fever.   HENT:  Negative for trouble swallowing.    Respiratory:  Positive for shortness of breath. Negative for cough and choking.    Cardiovascular:  Positive for chest pain. Negative for leg swelling.   Gastrointestinal:  Negative for constipation, diarrhea and nausea.   Genitourinary:  Negative for difficulty urinating.   Musculoskeletal:  Negative for arthralgias and back pain.   Neurological:  Negative for numbness and headaches.   Psychiatric/Behavioral:  Negative for behavioral problems and confusion.      Objective:     Vital Signs (Most Recent):  Temp: 97.8 °F (36.6 °C) (11/22/24 0816)  Pulse: 60 (11/22/24  0816)  Resp: 20 (11/22/24 0816)  BP: (!) 188/77 (11/22/24 0715)  SpO2: 99 % (11/22/24 0816) Vital Signs (24h Range):  Temp:  [97.8 °F (36.6 °C)-98.5 °F (36.9 °C)] 97.8 °F (36.6 °C)  Pulse:  [59-60] 60  Resp:  [16-25] 20  SpO2:  [96 %-100 %] 99 %  BP: (126-188)/(58-77) 188/77     Weight: 93.4 kg (205 lb 14.6 oz)  Body mass index is 35.34 kg/m².    Intake/Output Summary (Last 24 hours) at 11/22/2024 0830  Last data filed at 11/22/2024 0607  Gross per 24 hour   Intake 685 ml   Output 1300 ml   Net -615 ml      Physical Exam  Constitutional:       General: She is not in acute distress.     Appearance: Normal appearance. She is obese. She is not ill-appearing, toxic-appearing or diaphoretic.   HENT:      Head: Normocephalic and atraumatic.      Nose: Nose normal.      Mouth/Throat:      Mouth: Mucous membranes are moist.      Pharynx: Oropharynx is clear.   Eyes:      General: No scleral icterus.     Extraocular Movements: Extraocular movements intact.      Conjunctiva/sclera: Conjunctivae normal.      Pupils: Pupils are equal, round, and reactive to light.   Cardiovascular:      Rate and Rhythm: Normal rate and regular rhythm.      Pulses: Normal pulses.      Heart sounds: No murmur heard.     No gallop.   Pulmonary:      Effort: Pulmonary effort is normal. No respiratory distress.      Breath sounds: No stridor. No wheezing, rhonchi or rales (improving).   Chest:      Chest wall: No tenderness.   Abdominal:      General: Abdomen is flat. Bowel sounds are normal. There is no distension.      Palpations: Abdomen is soft.      Tenderness: There is no abdominal tenderness. There is no right CVA tenderness, guarding or rebound.   Musculoskeletal:         General: No swelling, tenderness, deformity or signs of injury. Normal range of motion.      Cervical back: Normal range of motion and neck supple. No rigidity or tenderness.      Right lower leg: No edema.      Left lower leg: No edema.   Skin:     General: Skin is warm  "and dry.      Coloration: Skin is not jaundiced.      Findings: No erythema or rash.   Neurological:      General: No focal deficit present.      Mental Status: She is alert and oriented to person, place, and time. Mental status is at baseline.      Motor: No weakness.   Psychiatric:         Mood and Affect: Mood normal.         Behavior: Behavior normal.         Thought Content: Thought content normal.         Judgment: Judgment normal.       Computed MELD 3.0 unavailable. One or more values for this score either were not found within the given timeframe or did not fit some other criterion.  Computed MELD-Na unavailable. One or more values for this score either were not found within the given timeframe or did not fit some other criterion.      Significant Labs:  CBC:  Recent Labs   Lab 11/21/24 0348 11/22/24 0217   WBC 4.74 5.26   HGB 8.2* 8.5*   HCT 26.1* 26.8*    325     CMP:  Recent Labs   Lab 11/21/24 0348 11/22/24 0217   * 138   K 4.0 4.1   CL 88* 89*   CO2 37* 39*   GLU 92 93   BUN 24* 21   CREATININE 0.7 0.7   CALCIUM 8.9 9.0   PROT 5.9* 6.0   ALBUMIN 2.3* 2.4*   BILITOT 0.3 0.3   ALKPHOS 37* 38*   AST 16 18   ALT 9* 11   ANIONGAP 8 10         Assessment/Plan:      * Acute on chronic diastolic congestive heart failure  Patient has Diastolic (HFpEF) heart failure that is Acute on chronic. On presentation their CHF was decompensated. Evidence of decompensated CHF on presentation includes: edema, elevated JVD, crackles on lung auscultation, weight gain, orthopnea, dyspnea on exertion (WHIT[place]), and shortness of breath. The etiology of their decompensation is likely medication adjustments . Patient is on the HF pathway. Most recent BNP and echo results are listed below.  No results for input(s): "BNP" in the last 72 hours.    Latest ECHO  Results for orders placed during the hospital encounter of 09/24/24    Echo    Interpretation Summary    Left Ventricle: The left ventricle is normal in size. " Normal wall thickness. There is concentric remodeling. There is normal systolic function with a visually estimated ejection fraction of 60 - 65%. Grade III diastolic dysfunction. Elevated left ventricular filling pressure.    Right Ventricle: Normal right ventricular cavity size. Wall thickness is normal. Systolic function is normal. Pacemaker lead present in the ventricle.    The left atrium is severely dilated.    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 It is reported to be a 26mm Phan Suzi S3 valve. Aortic valve area by VTI is 1.90 cm2. Aortic valve peak velocity is 1.64 m/s. Mean gradient is 6 mmHg. The dimensionless index is 0.57. There is mild aortic regurgitation.    Tricuspid Valve: There is mild regurgitation.    Pulmonary Artery: The estimated pulmonary artery systolic pressure is 48 mmHg.    IVC/SVC: Elevated venous pressure at 15 mmHg.    Pericardium: There is a small posterior effusion. No indication of cardiac tamponade.    Current Heart Failure Medications  spironolactone tablet 25 mg, Daily, Oral  hydrALAZINE injection 10 mg, Every 6 hours PRN, Intravenous  carvediloL tablet 12.5 mg, 2 times daily with meals, Oral  losartan tablet 50 mg, Daily, Oral  furosemide tablet 40 mg, Every 8 hours, Oral  - Place on telemetry  - Place on fluid restriction of 1.5 L.   - Cardiology has not been consulted  - Continue IV lasix. Patient still hypervolemic on physical exam, although improved from prior.   - strict intake and output, daily standing weights    Intake/Output Summary (Last 24 hours) at 11/22/2024 0834  Last data filed at 11/22/2024 0607  Gross per 24 hour   Intake 505 ml   Output 1300 ml   Net -795 ml       11/21- Lasix 80 mg iv q BB. Lorsatan, isosorbide, ranolazine, and spironolactone. And apixaban and plavix.   11/22- convert to po lasix 40 tid. Reviewed cardiology notes and it is unclear why jardiance was stopped. Will have pt f/u cardiology.       Acute  on chronic respiratory failure with hypoxia  Patient with Hypoxic Respiratory failure which is Acute on chronic.  she is on home oxygen at 2 LPM. Supplemental oxygen was provided and noted- Oxygen Concentration (%):  [30] 30  Signs/symptoms of respiratory failure include- tachypnea and increased work of breathing. Contributing diagnoses includes - CHF Labs and images were reviewed. Patient Has not had a recent ABG. Will treat underlying causes and adjust management of respiratory failure as follows- diuresis  - Discussed with family that goal O2 should be 88-92% given COPD. They will continue to titrate at home with pulse ox.   SEE above      Pleural effusion  Patient found to have small pleural effusion on imaging. I have personally reviewed and interpreted the following imaging: CT. A thoracentesis was performed. Pleural fluid was sent for analysis. Labs reviewed, including  . Fluid most consistent with Transudate.  . Most likely etiology includes Congestive Heart Failure. Management to include Diuresis    - improving      Aortic stenosis  Echocardiogram with evidence of aortic stenosis.The patient's most recent echocardiogram result is listed below. We will manage the valvular abnormality by monitoring.     Echo    Result Date: 11/14/2024    Left Ventricle: The left ventricle is normal in size. Normal wall   thickness. There is normal systolic function with a visually estimated   ejection fraction of 60 - 65%. Ejection fraction is approximately 65%.    Right Ventricle: Normal right ventricular cavity size. Wall thickness   is normal. Systolic function is normal.    Left Atrium: Left atrium is moderately dilated.    Right Atrium: Right atrium is moderately dilated.    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 It is reported to be a 26 mm Phan Suzi valve. Mildly   calcified cusps. Aortic valve area by VTI is 0.8 cm2. Aortic valve peak   velocity is 2.9 m/s. Mean  gradient is 21.8 mmHg. The dimensionless index   is 0.25.  msand AcT/ET ratio is 0.36. Findings and trend from prior   echocardiography evaluation is suggestive of prosthetic valve stenosis.   Clinical correlation is required.    Mitral Valve: There is moderate mitral annular calcification present.    Tricuspid Valve: PISA radius is 0.6cm and Vena Contracta is 0.7cm   consistent with moderate to severe regurgitation.    Pulmonary Artery: There is moderate to severe pulmonary hypertension.   The estimated pulmonary artery systolic pressure is 62 mmHg.    IVC/SVC: Elevated venous pressure at 15 mmHg.    Pericardium: There is a trivial circumferential effusion.        Echo    Result Date: 11/11/2024    Left Ventricle: The left ventricle is normal in size. Normal wall   thickness. There is normal systolic function. Ejection fraction is   approximately 65%. There is normal diastolic function.    Right Ventricle: Normal right ventricular cavity size. Wall thickness   is normal. Systolic function is normal. Pacemaker lead present in the   ventricle.    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 It is reported to be a 26 mm Phan valve. The leaflets appear   mildly thickened. Aortic valve area by VTI is 0.7 cm2. LVOT diameter is   1.9 cm. Aortic valve peak velocity is 3.3 m/s. Mean gradient is 33.3 mmHg.   The dimensionless index is 0.26. Mild paravalvular regurgitation.    Mitral Valve: There is moderate mitral annular calcification present.    Tricuspid Valve: There is moderate regurgitation.    Pulmonary Artery: There is pulmonary hypertension. The estimated   pulmonary artery systolic pressure is 62 mmHg.    IVC/SVC: Intermediate venous pressure at 8 mmHg.        Echo    Result Date: 9/25/2024    Left Ventricle: The left ventricle is normal in size. Normal wall   thickness. There is concentric remodeling. There is normal systolic   function with a visually estimated  ejection fraction of 60 - 65%. Grade   III diastolic dysfunction. Elevated left ventricular filling pressure.    Right Ventricle: Normal right ventricular cavity size. Wall thickness   is normal. Systolic function is normal. Pacemaker lead present in the   ventricle.    The left atrium is severely dilated.    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 It is reported to be a 26mm Phan Suzi S3 valve. Aortic   valve area by VTI is 1.90 cm2. Aortic valve peak velocity is 1.64 m/s.   Mean gradient is 6 mmHg. The dimensionless index is 0.57. There is mild   aortic regurgitation.    Tricuspid Valve: There is mild regurgitation.    Pulmonary Artery: The estimated pulmonary artery systolic pressure is   48 mmHg.    IVC/SVC: Elevated venous pressure at 15 mmHg.    Pericardium: There is a small posterior effusion. No indication of   cardiac tamponade.        Echo    Result Date: 2/10/2024    Left Ventricle: The left ventricle is normal in size. Normal wall   thickness. There is concentric remodeling. Regional wall motion   abnormalities present. There is moderately reduced systolic function with   a visually estimated ejection fraction of 35 - 40%. Ejection fraction by   visual approximation is 35%. There is normal diastolic function.    Right Ventricle: Normal right ventricular cavity size. Wall thickness   is normal. Right ventricle wall motion  is normal. Systolic function is   normal. Pacemaker lead present in the ventricle.    Left Atrium: Left atrium is moderately dilated.    Right Atrium: Right atrium is mildly dilated. Lead present in the right   atrium.    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 Aortic valve area by VTI is 1.03 cm². Aortic valve peak velocity   is 2.07 m/s. Mean gradient is 11 mmHg. The dimensionless index is 0.30.   There is trace aortic regurgitation.    Mitral Valve: There is mild mitral annular calcification  present.    Tricuspid Valve: There is mild regurgitation.    Pulmonary Artery: There is mild to moderate pulmonary hypertension. The   estimated pulmonary artery systolic pressure is 47 mmHg.    IVC/SVC: Normal venous pressure at 3 mmHg.        - Continue judicious diuresis given forward-flow dependence for aortic valve stenosis  - Will need OP follow up after hospitalization to discuss post-TAVR aortic valve stenosis    Permanent atrial fibrillation  Patient has permanent atrial fibrillation. Patient is currently in sinus rhythm. [id number] Score: 5. The patients heart rate in the last 24 hours is as follows:  Pulse  Min: 59  Max: 60   Antiarrhythmics:  Coreg 12.5 bid     Anticoagulants  apixaban tablet 5 mg, 2 times daily, Oral    Plan  - Replete lytes with a goal of K>4, Mg >2  - Patient is anticoagulated, see medications listed above.  - Patient's afib is currently controlled    11/22- paced rhythm on EKG, rate 60.         Anemia  Anemia is likely due to Iron deficiency. Most recent hemoglobin and hematocrit are listed below.  Recent Labs     11/20/24  0732 11/21/24  0348 11/22/24  0217   HGB 8.7* 8.2* 8.5*   HCT 26.0* 26.1* 26.8*       Plan  - Monitor serial CBC: Daily  - Transfuse PRBC if patient becomes hemodynamically unstable, symptomatic or H/H drops below 7/21.  - Patient has not received any PRBC transfusions to date  - Patient's anemia is currently stable    Essential hypertension  Patients blood pressure range in the last 24 hours was: BP  Min: 95/46  Max: 221/86.The patient's inpatient anti-hypertensive regimen is listed below:  Current Antihypertensives  isosorbide mononitrate 24 hr tablet 60 mg, Daily, Oral  ranolazine 12 hr tablet 1,000 mg, 2 times daily, Oral  spironolactone tablet 25 mg, Daily, Oral  hydrALAZINE injection 10 mg, Every 6 hours PRN, Intravenous  carvediloL tablet 12.5 mg, 2 times daily with meals, Oral  losartan tablet 50 mg, Daily, Oral  furosemide tablet 40 mg, Every 8 hours,  Oral    Plan  - BP is uncontrolled, will adjust as follows: can consider increasing carvedilol if HR can tolerate   - Losartan resumed.     Type 2 diabetes mellitus  Patient's FSGs are controlled on current medication regimen.  Last A1c reviewed-   Lab Results   Component Value Date    HGBA1C 5.2 11/10/2024     Most recent fingerstick glucose reviewed-   Recent Labs   Lab 11/21/24  1058 11/21/24  1459 11/21/24 2027 11/22/24  0807   POCTGLUCOSE 185* 127* 171* 117*       Current correctional scale  Low  Maintain anti-hyperglycemic dose as follows-   Antihyperglycemics (From admission, onward)      Start     Stop Route Frequency Ordered    11/10/24 1513  insulin aspart U-100 pen 0-5 Units         -- SubQ Before meals & nightly PRN 11/10/24 1414          Hold Oral hypoglycemics while patient is in the hospital.    Acute cystitis without hematuria  - patient with reports of dysuria and dark urine noted at bedside  - UA infectious appearing, but with 23 squams  - urine culture with E Fecalis, treated for 5 day course with Zosyn      CAD (coronary artery disease)  - history noted   - continue statin, plavix    COPD (chronic obstructive pulmonary disease)  Patient's COPD is controlled currently.  Patient is currently off COPD Pathway. Continue scheduled inhalers Supplemental oxygen and monitor respiratory status closely.     - prn duonebs  - titrate O2 to 88-92%, discussed with family    Gastroesophageal reflux disease  - continue home PPI      Hyponatremia  Hyponatremia is likely due to Heart Failure. The patient's most recent sodium results are listed below.  Recent Labs     11/20/24  0732 11/21/24  0348 11/22/24  0217   * 133* 138       Plan  - Correct the sodium by 4-6mEq in 24 hours.   - Obtain the following studies: Urine sodium, urine osmolality, serum osmolality or TSH, T4.  - Will treat the hyponatremia with diuresis  - Monitor sodium Daily.   - Patient hyponatremia is stable    Anxiety  - home  Lorazepam      HLD (hyperlipidemia)  - continue home statin      Irritant contact dermatitis due to friction or contact with body fluids, unspecified  Sacral wound with appreciated recs by Wound Care      Hypomagnesemia  Patient has Abnormal Magnesium: hypomagnesemia. Will continue to monitor electrolytes closely. Will replace the affected electrolytes and repeat labs to be done after interventions completed. The patient's magnesium results have been reviewed and are listed below.  Recent Labs   Lab 11/10/24  1200   MG 1.5*        Hyperkalemia  The patients most recent potassium results are listed below.  Recent Labs     11/08/24  1015 11/10/24  1200   K 4.7 5.6*     Plan  - Monitor for arrhythmias with EKG and/or continuous telemetry.   - Treat the hyperkalemia with Potassium Binders.   - Monitor potassium: Daily  - The patient's hyperkalemia is stable      VTE Risk Mitigation (From admission, onward)           Ordered     apixaban tablet 5 mg  2 times daily         11/11/24 1535     IP VTE HIGH RISK PATIENT  Once         11/10/24 1414     Reason for No Pharmacological VTE Prophylaxis  Once        Question:  Reasons:  Answer:  Already adequately anticoagulated on oral Anticoagulants    11/10/24 1414     Place sequential compression device  Until discontinued         11/10/24 1414                    Discharge Planning   MIMI: 11/22/2024     Code Status: Full Code   Is the patient medically ready for discharge?:     Reason for patient still in hospital (select all that apply): Patient trending condition  Discharge Plan A: Home Health   Discharge Delays: None known at this time      Clair Macias MD  Department of Hospital Medicine   [facility] ([place])

## 2024-11-22 NOTE — ASSESSMENT & PLAN NOTE
Hyponatremia is likely due to Heart Failure. The patient's most recent sodium results are listed below.  Recent Labs     11/20/24  0732 11/21/24  0348 11/22/24  0217   * 133* 138       Plan  - Correct the sodium by 4-6mEq in 24 hours.   - Obtain the following studies: Urine sodium, urine osmolality, serum osmolality or TSH, T4.  - Will treat the hyponatremia with diuresis  - Monitor sodium Daily.   - Patient hyponatremia is stable

## 2024-11-22 NOTE — PT/OT/SLP PROGRESS
"Physical Therapy Treatment    Patient Name:  Adriana Strong   MRN:  [id number]    Recommendations:     Discharge Recommendations: Moderate Intensity Therapy  Discharge Equipment Recommendations: none  Barriers to discharge: None    Assessment:     Adriana Strong is a 79 y.o. female admitted with a medical diagnosis of Acute on chronic diastolic congestive heart failure.  She presents with the following impairments/functional limitations: weakness, impaired endurance, impaired self care skills, impaired functional mobility, gait instability, impaired balance, decreased lower extremity function, impaired cardiopulmonary response to activity. Pt agreeable for therapy, family present during session, pt up in chair, focused on sit <> stand transfer and increasing endurance and activity tolerance through therex and repeated sit <> stand, pt returned to chair, family reports pt recently got back into chair from transferring to [facility]    Rehab Prognosis: Good; patient would benefit from acute skilled PT services to address these deficits and reach maximum level of function.    Recent Surgery: * No surgery found *      Plan:     During this hospitalization, patient to be seen 4 x/week to address the identified rehab impairments via gait training, therapeutic activities, therapeutic exercises, neuromuscular re-education and progress toward the following goals:    Plan of Care Expires:  12/12/24    Subjective     Chief Complaint: fatigue and dizziness  Patient/Family Comments/goals: "I can try"  Pain/Comfort:  Pain Rating 1: 0/10  Pain Rating Post-Intervention 1: 0/10      Objective:     Communicated with RN prior to session.  Patient found up in chair with oxygen, pulse ox (continuous), telemetry, blood pressure cuff upon PT entry to room.     General Precautions: Standard, fall  Orthopedic Precautions: N/A  Braces: N/A  Respiratory Status: Nasal cannula, flow 2 L/min     Functional Mobility:    Transfers:   Sit <> Stand " Transfer: minimum assistance from bedside chair using rolling walker , performs sit <> stand x 3 for 15-30s each attempt, limited by fatigue, BLE quiver a bit as fatigue sets in, c/o mild but persistent dizziness during activity, good push off into standing, extra time to gain balance    Balance:   Sitting balance: FAIR+: Maintains balance through MINIMAL excursions of active trunk motion  Standing balance:   FAIR: Maintains without assist but unable to take challenges        AM-PAC 6 CLICK MOBILITY  Turning over in bed (including adjusting bedclothes, sheets and blankets)?: 3  Sitting down on and standing up from a chair with arms (e.g., wheelchair, bedside commode, etc.): 3  Moving from lying on back to sitting on the side of the bed?: 3  Moving to and from a bed to a chair (including a wheelchair)?: 3  Need to walk in hospital room?: 3  Climbing 3-5 steps with a railing?: 2  Basic Mobility Total Score: 17       Treatment & Education:  Education:  PT role and PoC to increase strength and endurance  Benefits of therex to increase endurance and activity tolerance    Therex:LAQ, marches, ankle pumps, heel raises x 10 in sitting  Sit to stands to increase strength and endurance    Patient left up in chair with all lines intact, call button in reach, and family present..    GOALS:   Multidisciplinary Problems       Physical Therapy Goals          Problem: Physical Therapy    Goal Priority Disciplines Outcome Interventions   Physical Therapy Goal     PT, PT/OT Progressing    Description: Goals to be met by: 24     Patient will increase functional independence with mobility by performin. Supine to sit with Stand-by Assistance  2. Sit to stand transfer with Stand-by Assistance with RW.   3. Bed to chair transfer with Stand-by Assistance using Rolling Walker  4. Gait  x 100 feet with Stand-by Assistance using Rolling Walker.   5. Lower extremity exercise program x15 reps per handout, with assistance as needed  to increase tolerance to activities.                          Time Tracking:     PT Received On: 11/22/24  PT Start Time: 1045     PT Stop Time: 1103  PT Total Time (min): 18 min     Billable Minutes: Therapeutic Exercise 18min    Treatment Type: Treatment  PT/PTA: PTA     Number of PTA visits since last PT visit: 4     11/22/2024

## 2024-11-22 NOTE — ASSESSMENT & PLAN NOTE
Patients blood pressure range in the last 24 hours was: BP  Min: 95/46  Max: 221/86.The patient's inpatient anti-hypertensive regimen is listed below:  Current Antihypertensives  isosorbide mononitrate 24 hr tablet 60 mg, Daily, Oral  ranolazine 12 hr tablet 1,000 mg, 2 times daily, Oral  spironolactone tablet 25 mg, Daily, Oral  hydrALAZINE injection 10 mg, Every 6 hours PRN, Intravenous  carvediloL tablet 12.5 mg, 2 times daily with meals, Oral  losartan tablet 50 mg, Daily, Oral  furosemide tablet 40 mg, Every 8 hours, Oral    Plan  - BP is uncontrolled, will adjust as follows: can consider increasing carvedilol if HR can tolerate   - Losartan resumed.

## 2024-11-22 NOTE — ASSESSMENT & PLAN NOTE
Patient's FSGs are controlled on current medication regimen.  Last A1c reviewed-   Lab Results   Component Value Date    HGBA1C 5.2 11/10/2024     Most recent fingerstick glucose reviewed-   Recent Labs   Lab 11/21/24  1058 11/21/24  1459 11/21/24 2027 11/22/24  0807   POCTGLUCOSE 185* 127* 171* 117*       Current correctional scale  Low  Maintain anti-hyperglycemic dose as follows-   Antihyperglycemics (From admission, onward)    Start     Stop Route Frequency Ordered    11/10/24 1513  insulin aspart U-100 pen 0-5 Units         -- SubQ Before meals & nightly PRN 11/10/24 1414        Hold Oral hypoglycemics while patient is in the hospital.

## 2024-11-22 NOTE — SUBJECTIVE & OBJECTIVE
Interval History: see above    Review of Systems   Constitutional:  Positive for activity change. Negative for fever.   HENT:  Negative for trouble swallowing.    Respiratory:  Positive for shortness of breath. Negative for cough and choking.    Cardiovascular:  Positive for chest pain. Negative for leg swelling.   Gastrointestinal:  Negative for constipation, diarrhea and nausea.   Genitourinary:  Negative for difficulty urinating.   Musculoskeletal:  Negative for arthralgias and back pain.   Neurological:  Negative for numbness and headaches.   Psychiatric/Behavioral:  Negative for behavioral problems and confusion.      Objective:     Vital Signs (Most Recent):  Temp: 97.8 °F (36.6 °C) (11/22/24 0816)  Pulse: 60 (11/22/24 0816)  Resp: 20 (11/22/24 0816)  BP: (!) 188/77 (11/22/24 0715)  SpO2: 99 % (11/22/24 0816) Vital Signs (24h Range):  Temp:  [97.8 °F (36.6 °C)-98.5 °F (36.9 °C)] 97.8 °F (36.6 °C)  Pulse:  [59-60] 60  Resp:  [16-25] 20  SpO2:  [96 %-100 %] 99 %  BP: (126-188)/(58-77) 188/77     Weight: 93.4 kg (205 lb 14.6 oz)  Body mass index is 35.34 kg/m².    Intake/Output Summary (Last 24 hours) at 11/22/2024 0830  Last data filed at 11/22/2024 0607  Gross per 24 hour   Intake 685 ml   Output 1300 ml   Net -615 ml      Physical Exam  Constitutional:       General: She is not in acute distress.     Appearance: Normal appearance. She is obese. She is not ill-appearing, toxic-appearing or diaphoretic.   HENT:      Head: Normocephalic and atraumatic.      Nose: Nose normal.      Mouth/Throat:      Mouth: Mucous membranes are moist.      Pharynx: Oropharynx is clear.   Eyes:      General: No scleral icterus.     Extraocular Movements: Extraocular movements intact.      Conjunctiva/sclera: Conjunctivae normal.      Pupils: Pupils are equal, round, and reactive to light.   Cardiovascular:      Rate and Rhythm: Normal rate and regular rhythm.      Pulses: Normal pulses.      Heart sounds: No murmur heard.     No  gallop.   Pulmonary:      Effort: Pulmonary effort is normal. No respiratory distress.      Breath sounds: No stridor. No wheezing, rhonchi or rales (improving).   Chest:      Chest wall: No tenderness.   Abdominal:      General: Abdomen is flat. Bowel sounds are normal. There is no distension.      Palpations: Abdomen is soft.      Tenderness: There is no abdominal tenderness. There is no right CVA tenderness, guarding or rebound.   Musculoskeletal:         General: No swelling, tenderness, deformity or signs of injury. Normal range of motion.      Cervical back: Normal range of motion and neck supple. No rigidity or tenderness.      Right lower leg: No edema.      Left lower leg: No edema.   Skin:     General: Skin is warm and dry.      Coloration: Skin is not jaundiced.      Findings: No erythema or rash.   Neurological:      General: No focal deficit present.      Mental Status: She is alert and oriented to person, place, and time. Mental status is at baseline.      Motor: No weakness.   Psychiatric:         Mood and Affect: Mood normal.         Behavior: Behavior normal.         Thought Content: Thought content normal.         Judgment: Judgment normal.       Computed MELD 3.0 unavailable. One or more values for this score either were not found within the given timeframe or did not fit some other criterion.  Computed MELD-Na unavailable. One or more values for this score either were not found within the given timeframe or did not fit some other criterion.      Significant Labs:  CBC:  Recent Labs   Lab 11/21/24 0348 11/22/24 0217   WBC 4.74 5.26   HGB 8.2* 8.5*   HCT 26.1* 26.8*    325     CMP:  Recent Labs   Lab 11/21/24 0348 11/22/24 0217   * 138   K 4.0 4.1   CL 88* 89*   CO2 37* 39*   GLU 92 93   BUN 24* 21   CREATININE 0.7 0.7   CALCIUM 8.9 9.0   PROT 5.9* 6.0   ALBUMIN 2.3* 2.4*   BILITOT 0.3 0.3   ALKPHOS 37* 38*   AST 16 18   ALT 9* 11   ANIONGAP 8 10

## 2024-11-22 NOTE — ASSESSMENT & PLAN NOTE
Patient found to have small pleural effusion on imaging. I have personally reviewed and interpreted the following imaging: CT. A thoracentesis was performed. Pleural fluid was sent for analysis. Labs reviewed, including  . Fluid most consistent with Transudate.  . Most likely etiology includes Congestive Heart Failure. Management to include Diuresis    - improving

## 2024-11-23 VITALS
DIASTOLIC BLOOD PRESSURE: 61 MMHG | SYSTOLIC BLOOD PRESSURE: 127 MMHG | HEIGHT: 64 IN | BODY MASS INDEX: 35.16 KG/M2 | WEIGHT: 205.94 LBS | TEMPERATURE: 98 F | HEART RATE: 60 BPM | RESPIRATION RATE: 20 BRPM | OXYGEN SATURATION: 99 %

## 2024-11-23 LAB
ALBUMIN SERPL BCP-MCNC: 2.4 G/DL (ref 3.5–5.2)
ALP SERPL-CCNC: 39 U/L (ref 40–150)
ALT SERPL W/O P-5'-P-CCNC: 11 U/L (ref 10–44)
ANION GAP SERPL CALC-SCNC: 10 MMOL/L (ref 8–16)
AST SERPL-CCNC: 17 U/L (ref 10–40)
BASOPHILS # BLD AUTO: 0.05 K/UL (ref 0–0.2)
BASOPHILS NFR BLD: 0.7 % (ref 0–1.9)
BILIRUB SERPL-MCNC: 0.3 MG/DL (ref 0.1–1)
BUN SERPL-MCNC: 18 MG/DL (ref 8–23)
CALCIUM SERPL-MCNC: 9 MG/DL (ref 8.7–10.5)
CHLORIDE SERPL-SCNC: 89 MMOL/L (ref 95–110)
CO2 SERPL-SCNC: 35 MMOL/L (ref 23–29)
CREAT SERPL-MCNC: 0.7 MG/DL (ref 0.5–1.4)
DIFFERENTIAL METHOD BLD: ABNORMAL
EOSINOPHIL # BLD AUTO: 0 K/UL (ref 0–0.5)
EOSINOPHIL NFR BLD: 0.4 % (ref 0–8)
ERYTHROCYTE [DISTWIDTH] IN BLOOD BY AUTOMATED COUNT: 18.5 % (ref 11.5–14.5)
EST. GFR  (NO RACE VARIABLE): >60 ML/MIN/1.73 M^2
GLUCOSE SERPL-MCNC: 99 MG/DL (ref 70–110)
HCT VFR BLD AUTO: 28.5 % (ref 37–48.5)
HGB BLD-MCNC: 9 G/DL (ref 12–16)
IMM GRANULOCYTES # BLD AUTO: 0.05 K/UL (ref 0–0.04)
IMM GRANULOCYTES NFR BLD AUTO: 0.7 % (ref 0–0.5)
LYMPHOCYTES # BLD AUTO: 0.9 K/UL (ref 1–4.8)
LYMPHOCYTES NFR BLD: 12.2 % (ref 18–48)
MAGNESIUM SERPL-MCNC: 1.7 MG/DL (ref 1.6–2.6)
MCH RBC QN AUTO: 26.5 PG (ref 27–31)
MCHC RBC AUTO-ENTMCNC: 31.6 G/DL (ref 32–36)
MCV RBC AUTO: 84 FL (ref 82–98)
MONOCYTES # BLD AUTO: 0.9 K/UL (ref 0.3–1)
MONOCYTES NFR BLD: 11.4 % (ref 4–15)
NEUTROPHILS # BLD AUTO: 5.7 K/UL (ref 1.8–7.7)
NEUTROPHILS NFR BLD: 74.6 % (ref 38–73)
NRBC BLD-RTO: 0 /100 WBC
PHOSPHATE SERPL-MCNC: 3 MG/DL (ref 2.7–4.5)
PLATELET # BLD AUTO: 323 K/UL (ref 150–450)
PMV BLD AUTO: 10 FL (ref 9.2–12.9)
POCT GLUCOSE: 123 MG/DL (ref 70–110)
POCT GLUCOSE: 151 MG/DL (ref 70–110)
POTASSIUM SERPL-SCNC: 4.3 MMOL/L (ref 3.5–5.1)
PROT SERPL-MCNC: 6 G/DL (ref 6–8.4)
RBC # BLD AUTO: 3.4 M/UL (ref 4–5.4)
SODIUM SERPL-SCNC: 134 MMOL/L (ref 136–145)
WBC # BLD AUTO: 7.63 K/UL (ref 3.9–12.7)

## 2024-11-23 PROCEDURE — 80053 COMPREHEN METABOLIC PANEL: CPT

## 2024-11-23 PROCEDURE — 25000003 PHARM REV CODE 250: Performed by: PHYSICIAN ASSISTANT

## 2024-11-23 PROCEDURE — 36415 COLL VENOUS BLD VENIPUNCTURE: CPT

## 2024-11-23 PROCEDURE — 25000003 PHARM REV CODE 250

## 2024-11-23 PROCEDURE — 83735 ASSAY OF MAGNESIUM: CPT

## 2024-11-23 PROCEDURE — 25000003 PHARM REV CODE 250: Performed by: INTERNAL MEDICINE

## 2024-11-23 PROCEDURE — 85025 COMPLETE CBC W/AUTO DIFF WBC: CPT

## 2024-11-23 PROCEDURE — 25000003 PHARM REV CODE 250: Performed by: STUDENT IN AN ORGANIZED HEALTH CARE EDUCATION/TRAINING PROGRAM

## 2024-11-23 PROCEDURE — 84100 ASSAY OF PHOSPHORUS: CPT

## 2024-11-23 RX ORDER — LOSARTAN POTASSIUM 50 MG/1
50 TABLET ORAL DAILY
Qty: 90 TABLET | Refills: 3 | Status: SHIPPED | OUTPATIENT
Start: 2024-11-24 | End: 2025-11-24

## 2024-11-23 RX ORDER — CARVEDILOL 12.5 MG/1
12.5 TABLET ORAL 2 TIMES DAILY WITH MEALS
Qty: 180 TABLET | Refills: 3 | Status: SHIPPED | OUTPATIENT
Start: 2024-11-23 | End: 2025-11-23

## 2024-11-23 RX ORDER — SENNOSIDES 8.6 MG/1
1 TABLET ORAL 2 TIMES DAILY
Qty: 60 TABLET | Refills: 11 | Status: SHIPPED | OUTPATIENT
Start: 2024-11-23

## 2024-11-23 RX ADMIN — CARVEDILOL 12.5 MG: 12.5 TABLET, FILM COATED ORAL at 08:11

## 2024-11-23 RX ADMIN — OXYCODONE HYDROCHLORIDE AND ACETAMINOPHEN 1000 MG: 500 TABLET ORAL at 08:11

## 2024-11-23 RX ADMIN — ATORVASTATIN CALCIUM 10 MG: 10 TABLET, FILM COATED ORAL at 08:11

## 2024-11-23 RX ADMIN — CLOPIDOGREL BISULFATE 75 MG: 75 TABLET ORAL at 08:11

## 2024-11-23 RX ADMIN — APIXABAN 5 MG: 5 TABLET, FILM COATED ORAL at 08:11

## 2024-11-23 RX ADMIN — FUROSEMIDE 40 MG: 40 TABLET ORAL at 06:11

## 2024-11-23 RX ADMIN — DULOXETINE HYDROCHLORIDE 20 MG: 20 CAPSULE, DELAYED RELEASE ORAL at 08:11

## 2024-11-23 RX ADMIN — PANTOPRAZOLE SODIUM 40 MG: 40 TABLET, DELAYED RELEASE ORAL at 08:11

## 2024-11-23 RX ADMIN — LORAZEPAM 0.5 MG: 0.5 TABLET ORAL at 08:11

## 2024-11-23 RX ADMIN — Medication: at 09:11

## 2024-11-23 RX ADMIN — MONTELUKAST 10 MG: 10 TABLET, FILM COATED ORAL at 08:11

## 2024-11-23 RX ADMIN — RANOLAZINE 1000 MG: 500 TABLET, EXTENDED RELEASE ORAL at 08:11

## 2024-11-23 RX ADMIN — ISOSORBIDE MONONITRATE 60 MG: 60 TABLET, EXTENDED RELEASE ORAL at 08:11

## 2024-11-23 RX ADMIN — LOSARTAN POTASSIUM 50 MG: 50 TABLET, FILM COATED ORAL at 08:11

## 2024-11-23 RX ADMIN — SPIRONOLACTONE 25 MG: 25 TABLET ORAL at 08:11

## 2024-11-23 NOTE — PLAN OF CARE
Patient is A&O x4. VSS wnl. SPO2 wnl of 3L NC. Patient xc/o sob. RT was called and pt received 1 breathing tx. BiPAP at night. Full relief obtained. Pt requested to take BiPAP mask off at 0100. 3L NC was applied back on pt. 1.5 L fluid restriction was maintained. Pt and daughter was educated on the importance of restricting the patient's fluids. Purewick in place and collecting dark, yellow urine. BS monitoring. Remained free of falls and injuries. Instructed to call staff for assistance. No known needs at this time. Care is ongoing. Bed is locked and lowest position. Call light is within reach.     Problem: Adult Inpatient Plan of Care  Goal: Plan of Care Review  Outcome: Progressing  Goal: Patient-Specific Goal (Individualized)  Outcome: Progressing  Goal: Absence of Hospital-Acquired Illness or Injury  Outcome: Progressing  Goal: Optimal Comfort and Wellbeing  Outcome: Progressing  Goal: Readiness for Transition of Care  Outcome: Progressing     Problem: Infection  Goal: Absence of Infection Signs and Symptoms  Outcome: Progressing     Problem: Diabetes Comorbidity  Goal: Blood Glucose Level Within Targeted Range  Outcome: Progressing     Problem: Acute Kidney Injury/Impairment  Goal: Fluid and Electrolyte Balance  Outcome: Progressing  Goal: Improved Oral Intake  Outcome: Progressing  Goal: Effective Renal Function  Outcome: Progressing     Problem: Wound  Goal: Optimal Coping  Outcome: Progressing  Goal: Optimal Functional Ability  Outcome: Progressing  Goal: Absence of Infection Signs and Symptoms  Outcome: Progressing  Goal: Improved Oral Intake  Outcome: Progressing  Goal: Optimal Pain Control and Function  Outcome: Progressing  Goal: Skin Health and Integrity  Outcome: Progressing  Goal: Optimal Wound Healing  Outcome: Progressing     Problem: Fall Injury Risk  Goal: Absence of Fall and Fall-Related Injury  Outcome: Progressing     Problem: Skin Injury Risk Increased  Goal: Skin Health and  Integrity  Outcome: Progressing

## 2024-11-23 NOTE — PLAN OF CARE
The pt's current with [facility]. The sw faxed the pt's hh orders to them and notified them the pt is discharging today.        11/23/24 1410   Post-Acute Status   Post-Acute Authorization Home Health   Home Health Status Set-up Complete/Auth obtained   Discharge Plan   Discharge Plan A Home Health   Discharge Plan B Home Health

## 2024-11-23 NOTE — ASSESSMENT & PLAN NOTE
Hyponatremia is likely due to Heart Failure. The patient's most recent sodium results are listed below.  Recent Labs     11/21/24  0348 11/22/24  0217 11/23/24  0446   * 138 134*       Plan  - Correct the sodium by 4-6mEq in 24 hours.   - Obtain the following studies: Urine sodium, urine osmolality, serum osmolality or TSH, T4.  - Will treat the hyponatremia with diuresis  - Monitor sodium Daily.   - Patient hyponatremia is stable

## 2024-11-23 NOTE — NURSING
Pt AAOx4, VSS. Started on PO lasix. Diuresing well. Prn Ativan and Tylenol given. Nad present. Bed locked and in lowest position. Call bell in reach.Safety measures maintained.

## 2024-11-23 NOTE — ASSESSMENT & PLAN NOTE
"Patient has Diastolic (HFpEF) heart failure that is Acute on chronic. On presentation their CHF was decompensated. Evidence of decompensated CHF on presentation includes: edema, elevated JVD, crackles on lung auscultation, weight gain, orthopnea, dyspnea on exertion ([place]), and shortness of breath. The etiology of their decompensation is likely medication adjustments . Patient is on the HF pathway. Most recent BNP and echo results are listed below.  No results for input(s): "BNP" in the last 72 hours.    Latest ECHO  Results for orders placed during the hospital encounter of 09/24/24    Echo    Interpretation Summary    Left Ventricle: The left ventricle is normal in size. Normal wall thickness. There is concentric remodeling. There is normal systolic function with a visually estimated ejection fraction of 60 - 65%. Grade III diastolic dysfunction. Elevated left ventricular filling pressure.    Right Ventricle: Normal right ventricular cavity size. Wall thickness is normal. Systolic function is normal. Pacemaker lead present in the ventricle.    The left atrium is severely dilated.    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 It is reported to be a 26mm Phan Suzi S3 valve. Aortic valve area by VTI is 1.90 cm2. Aortic valve peak velocity is 1.64 m/s. Mean gradient is 6 mmHg. The dimensionless index is 0.57. There is mild aortic regurgitation.    Tricuspid Valve: There is mild regurgitation.    Pulmonary Artery: The estimated pulmonary artery systolic pressure is 48 mmHg.    IVC/SVC: Elevated venous pressure at 15 mmHg.    Pericardium: There is a small posterior effusion. No indication of cardiac tamponade.    Current Heart Failure Medications  spironolactone tablet 25 mg, Daily, Oral  hydrALAZINE injection 10 mg, Every 6 hours PRN, Intravenous  carvediloL tablet 12.5 mg, 2 times daily with meals, Oral  losartan tablet 50 mg, Daily, Oral  furosemide tablet 40 mg, Every 8 " hours, Oral  - Place on telemetry  - Place on fluid restriction of 1.5 L.   - Cardiology has not been consulted  - Continue IV lasix. Patient still hypervolemic on physical exam, although improved from prior.   - strict intake and output, daily standing weights    Intake/Output Summary (Last 24 hours) at 11/23/2024 1037  Last data filed at 11/23/2024 0852  Gross per 24 hour   Intake 600 ml   Output 1800 ml   Net -1200 ml       11/21- Lasix 80 mg iv q BB. Lorsatan, isosorbide, ranolazine, and spironolactone. And apixaban and plavix.   11/22- convert to po lasix 40 tid. Reviewed cardiology notes and it is unclear why jardiance was stopped.   11/23- good UO on po lasix. Will have pt f/u cardiology, [facility], HH and HF transition clinic.

## 2024-11-23 NOTE — DISCHARGE SUMMARY
[facility] - Stepdown Flex ([place] [id number])  [facility] Medicine  Discharge Summary      Patient Name: Adriana Strong  MRN: [id number]  LENNOX: [id number]  Patient Class: IP- Inpatient  Admission Date: 11/10/2024  Hospital Length of Stay: 13 days  Discharge Date and Time: No discharge date for patient encounter.  Attending Physician: Clair Macias MD   Discharging Provider: Clair Macias MD  Primary Care Provider: Krzysztof Mcgraw MD  [facility] Medicine Team: [facility] Clair Macias MD  Primary Care Team: [facility]    HPI:   Adriana Strong is a 79 y.o.F with hx of diastolic HF, Afib on elqiuis, s/p pacemaker, HLD, HTN, non obstructive CAD, s/p TAVR, asthma, COPD, on home oxygen (2L via NC),  DM, CVA, CHF, AF and PPM with upgrade to CRT P in May 2024, history of inferior mesenteric revascularization with a 5 mm bare metal stent.  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 Patient reports SOB at rest and with exertion. States she saw cardiology last week where labs were performed. She was notified that her sodium was low and MD requested that patient hold her lasix x3d, continue spironolactone and after the 3d start lasix 40mg daily and additional prn. Otherwise, patient reports compliance with medications, fluid restriction, Na restriction. Also endorsing dysuria for the last couple of days as well as decreased oral intake. Denies fever, chills, chest pain, palpitations, abdominal pain, n/v/d, headaches, or any other symptoms at this time.     In ED: AF, hypertensive, tachypneic on 2-4L via NC. CBC with chronic, stable anemia. Na 128, K 5.6, otherwise CMP unremarkable. Mag 1.5. . Trop negative. TSH 2.559. POC lactate 1.96. UA with 3+ protein, 1+ leukocyte esterase, 17 WBCs, many bacteria, 23 squams. Culture pending. POC US in ED with trace pericardial effusion. CXR with  left pleural effusion and congestive change/edema, developing left lower lung zone consolidation not excluded in this hypoventilatory exam. S/p lasix 80mg inj x2 in ED. Admitted to  for further evaluation    * No surgery found *      Hospital Course:   S/p MICU admission for rescue BiPAP, diuresis, and UTI/CAP treatment weaned back to 2L prior to step down. Physical exam without obvious hypervolemia, but pt demonstrated JVD and Echo 11/14 with CVP 15. Continued to use BiPAP QHS and with long naps. Does not have a machine at home, but could benefit from OP Sleep Medicine evaluation. PT/OT evaluation recs for moderate-intensity therapy, but family reports significant family support and motivation to do therapy at home. Continues to be hypervolemic, continuing IV lasix.    11/21-  care for pt at home and dispenses medications.   - BP  126/58 Pulse 59   SpO2 100%   on IV diuresis. Lasix 80 mg iv q BB. Lorsatan, isosorbide, ranolazine, and spironolactone. And apixaban and plavix. Jardiance was stopped for uncertain reasons. Consider convert to po lasix in am     11/22- reporting HA and chest pain. Has dual paced rhythm, no changes on EKG. BP  188/77, Pulse 60 other VSS. Did not receive am meds yet. Mag  1.5- repleted.  UO= 1300. On lasix 80 IV bid. Her ususal home lasix dose ion 40 bid. Will convert to lasix 40 tid and monitor UO today.     11/23- UO on po lasix 40 q 8 is 1550 + unmeasured urinations. Blood pressure 171/72, pulse 60, note BP is before am meds, but goes down each afternoon.  May dc to home.  manages her medications. F/u  [facility] at home. HF transition clinic. And HH.      Goals of Care Treatment Preferences:  Code Status: Full Code    Living Will: Yes              SDOH Screening:  The patient was screened for utility difficulties, food insecurity, transport difficulties, housing insecurity, and interpersonal safety and there were no concerns identified this admission.     Consults:    Consults (From admission, onward)          Status Ordering Provider     Inpatient consult to Skin Integrity  Practitioner  Once        Provider:  Leslee Woo, NP    Completed ANTONIO PIRES     Inpatient consult to Critical Care Medicine  Once        Provider:  (Not yet assigned)    Completed BESSY PENA     Inpatient consult to Social Work/Case Management  Once        Provider:  (Not yet assigned)    Acknowledged AZUL BURGOS     Inpatient consult to Registered Dietitian/Nutritionist  Once        Provider:  (Not yet assigned)    Completed AZUL BURGOS            Psychiatric  Anxiety  - home Lorazepam      Derm  Irritant contact dermatitis due to friction or contact with body fluids, unspecified  Sacral wound with appreciated recs by Wound Care      Pulmonary  COPD (chronic obstructive pulmonary disease)  Patient's COPD is controlled currently.  Patient is currently off COPD Pathway. Continue scheduled inhalers Supplemental oxygen and monitor respiratory status closely.     - prn duonebs  - titrate O2 to 88-92%, discussed with family    Pleural effusion  Patient found to have small pleural effusion on imaging. I have personally reviewed and interpreted the following imaging: CT. A thoracentesis was performed. Pleural fluid was sent for analysis. Labs reviewed, including  . Fluid most consistent with Transudate.  . Most likely etiology includes Congestive Heart Failure. Management to include Diuresis    - improving      Acute on chronic respiratory failure with hypoxia  Patient with Hypoxic Respiratory failure which is Acute on chronic.  she is on home oxygen at 2 LPM. Supplemental oxygen was provided and noted-    Signs/symptoms of respiratory failure include- tachypnea and increased work of breathing. Contributing diagnoses includes - CHF Labs and images were reviewed. Patient Has not had a recent ABG. Will treat underlying causes and adjust management of respiratory failure as  "follows- diuresis  - Discussed with family that goal O2 should be 88-92% given COPD. They will continue to titrate at home with pulse ox.   SEE above      Cardiac/Vascular  * Acute on chronic diastolic congestive heart failure  Patient has Diastolic (HFpEF) heart failure that is Acute on chronic. On presentation their CHF was decompensated. Evidence of decompensated CHF on presentation includes: edema, elevated JVD, crackles on lung auscultation, weight gain, orthopnea, dyspnea on exertion (WHITLEY), and shortness of breath. The etiology of their decompensation is likely medication adjustments . Patient is on the HF pathway. Most recent BNP and echo results are listed below.  No results for input(s): "BNP" in the last 72 hours.    Latest ECHO  Results for orders placed during the hospital encounter of 09/24/24    Echo    Interpretation Summary    Left Ventricle: The left ventricle is normal in size. Normal wall thickness. There is concentric remodeling. There is normal systolic function with a visually estimated ejection fraction of 60 - 65%. Grade III diastolic dysfunction. Elevated left ventricular filling pressure.    Right Ventricle: Normal right ventricular cavity size. Wall thickness is normal. Systolic function is normal. Pacemaker lead present in the ventricle.    The left atrium is severely dilated.    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 It is reported to be a 26mm Phan Suzi S3 valve. Aortic valve area by VTI is 1.90 cm2. Aortic valve peak velocity is 1.64 m/s. Mean gradient is 6 mmHg. The dimensionless index is 0.57. There is mild aortic regurgitation.    Tricuspid Valve: There is mild regurgitation.    Pulmonary Artery: The estimated pulmonary artery systolic pressure is 48 mmHg.    IVC/SVC: Elevated venous pressure at 15 mmHg.    Pericardium: There is a small posterior effusion. No indication of cardiac tamponade.    Current Heart Failure " Medications  spironolactone tablet 25 mg, Daily, Oral  hydrALAZINE injection 10 mg, Every 6 hours PRN, Intravenous  carvediloL tablet 12.5 mg, 2 times daily with meals, Oral  losartan tablet 50 mg, Daily, Oral  furosemide tablet 40 mg, Every 8 hours, Oral  - Place on telemetry  - Place on fluid restriction of 1.5 L.   - Cardiology has not been consulted  - Continue IV lasix. Patient still hypervolemic on physical exam, although improved from prior.   - strict intake and output, daily standing weights    Intake/Output Summary (Last 24 hours) at 11/23/2024 1037  Last data filed at 11/23/2024 0852  Gross per 24 hour   Intake 600 ml   Output 1800 ml   Net -1200 ml       11/21- Lasix 80 mg iv q BB. Lorsatan, isosorbide, ranolazine, and spironolactone. And apixaban and plavix.   11/22- convert to po lasix 40 tid. Reviewed cardiology notes and it is unclear why jardiance was stopped.   11/23- good UO on po lasix. Will have pt f/u cardiology, [facility] at home, HH and HF transition clinic.       HLD (hyperlipidemia)  - continue home statin      CAD (coronary artery disease)  - history noted   - continue statin, plavix    Essential hypertension  Patients blood pressure range in the last 24 hours was: BP  Min: 95/46  Max: 221/86.The patient's inpatient anti-hypertensive regimen is listed below:  Current Antihypertensives  isosorbide mononitrate 24 hr tablet 60 mg, Daily, Oral  ranolazine 12 hr tablet 1,000 mg, 2 times daily, Oral  spironolactone tablet 25 mg, Daily, Oral  hydrALAZINE injection 10 mg, Every 6 hours PRN, Intravenous  carvediloL tablet 12.5 mg, 2 times daily with meals, Oral  losartan tablet 50 mg, Daily, Oral  furosemide tablet 40 mg, Every 8 hours, Oral    Plan  - BP is uncontrolled, will adjust as follows: can consider increasing carvedilol if HR can tolerate   - Losartan resumed.     Aortic stenosis  Echocardiogram with evidence of aortic stenosis.The patient's most recent echocardiogram result is listed below.  We will manage the valvular abnormality by monitoring.     Echo    Result Date: 11/14/2024    Left Ventricle: The left ventricle is normal in size. Normal wall   thickness. There is normal systolic function with a visually estimated   ejection fraction of 60 - 65%. Ejection fraction is approximately 65%.    Right Ventricle: Normal right ventricular cavity size. Wall thickness   is normal. Systolic function is normal.    Left Atrium: Left atrium is moderately dilated.    Right Atrium: Right atrium is moderately dilated.    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 It is reported to be a 26 mm Phan Suzi valve. Mildly   calcified cusps. Aortic valve area by VTI is 0.8 cm2. Aortic valve peak   velocity is 2.9 m/s. Mean gradient is 21.8 mmHg. The dimensionless index   is 0.25.  msand AcT/ET ratio is 0.36. Findings and trend from prior   echocardiography evaluation is suggestive of prosthetic valve stenosis.   Clinical correlation is required.    Mitral Valve: There is moderate mitral annular calcification present.    Tricuspid Valve: PISA radius is 0.6cm and Vena Contracta is 0.7cm   consistent with moderate to severe regurgitation.    Pulmonary Artery: There is moderate to severe pulmonary hypertension.   The estimated pulmonary artery systolic pressure is 62 mmHg.    IVC/SVC: Elevated venous pressure at 15 mmHg.    Pericardium: There is a trivial circumferential effusion.        Echo    Result Date: 11/11/2024    Left Ventricle: The left ventricle is normal in size. Normal wall   thickness. There is normal systolic function. Ejection fraction is   approximately 65%. There is normal diastolic function.    Right Ventricle: Normal right ventricular cavity size. Wall thickness   is normal. Systolic function is normal. Pacemaker lead present in the   ventricle.    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 It is reported to be a 26  mm Phan valve. The leaflets appear   mildly thickened. Aortic valve area by VTI is 0.7 cm2. LVOT diameter is   1.9 cm. Aortic valve peak velocity is 3.3 m/s. Mean gradient is 33.3 mmHg.   The dimensionless index is 0.26. Mild paravalvular regurgitation.    Mitral Valve: There is moderate mitral annular calcification present.    Tricuspid Valve: There is moderate regurgitation.    Pulmonary Artery: There is pulmonary hypertension. The estimated   pulmonary artery systolic pressure is 62 mmHg.    IVC/SVC: Intermediate venous pressure at 8 mmHg.        Echo    Result Date: 9/25/2024    Left Ventricle: The left ventricle is normal in size. Normal wall   thickness. There is concentric remodeling. There is normal systolic   function with a visually estimated ejection fraction of 60 - 65%. Grade   III diastolic dysfunction. Elevated left ventricular filling pressure.    Right Ventricle: Normal right ventricular cavity size. Wall thickness   is normal. Systolic function is normal. Pacemaker lead present in the   ventricle.    The left atrium is severely dilated.    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 It is reported to be a 26mm Phan Suzi S3 valve. Aortic   valve area by VTI is 1.90 cm2. Aortic valve peak velocity is 1.64 m/s.   Mean gradient is 6 mmHg. The dimensionless index is 0.57. There is mild   aortic regurgitation.    Tricuspid Valve: There is mild regurgitation.    Pulmonary Artery: The estimated pulmonary artery systolic pressure is   48 mmHg.    IVC/SVC: Elevated venous pressure at 15 mmHg.    Pericardium: There is a small posterior effusion. No indication of   cardiac tamponade.        Echo    Result Date: 2/10/2024    Left Ventricle: The left ventricle is normal in size. Normal wall   thickness. There is concentric remodeling. Regional wall motion   abnormalities present. There is moderately reduced systolic function with   a visually estimated ejection  fraction of 35 - 40%. Ejection fraction by   visual approximation is 35%. There is normal diastolic function.    Right Ventricle: Normal right ventricular cavity size. Wall thickness   is normal. Right ventricle wall motion  is normal. Systolic function is   normal. Pacemaker lead present in the ventricle.    Left Atrium: Left atrium is moderately dilated.    Right Atrium: Right atrium is mildly dilated. Lead present in the right   atrium.    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 Aortic valve area by VTI is 1.03 cm². Aortic valve peak velocity   is 2.07 m/s. Mean gradient is 11 mmHg. The dimensionless index is 0.30.   There is trace aortic regurgitation.    Mitral Valve: There is mild mitral annular calcification present.    Tricuspid Valve: There is mild regurgitation.    Pulmonary Artery: There is mild to moderate pulmonary hypertension. The   estimated pulmonary artery systolic pressure is 47 mmHg.    IVC/SVC: Normal venous pressure at 3 mmHg.        - Continue judicious diuresis given forward-flow dependence for aortic valve stenosis  - Will need OP follow up after hospitalization to discuss post-TAVR aortic valve stenosis    Permanent atrial fibrillation  Patient has permanent atrial fibrillation. Patient is currently in sinus rhythm. [id number] Score: 5. The patients heart rate in the last 24 hours is as follows:  Pulse  Min: 59  Max: 64   Antiarrhythmics:  Coreg 12.5 bid     Anticoagulants  apixaban tablet 5 mg, 2 times daily, Oral    Plan  - Replete lytes with a goal of K>4, Mg >2  - Patient is anticoagulated, see medications listed above.  - Patient's afib is currently controlled    11/22- paced rhythm on EKG, rate 60.   STABLE        Renal/  Acute cystitis without hematuria  - patient with reports of dysuria and dark urine noted at bedside  - UA infectious appearing, but with 23 squams  - urine culture with E Fecalis, treated for 5 day course with  Zosyn      Oncology  Anemia  Anemia is likely due to Iron deficiency. Most recent hemoglobin and hematocrit are listed below.  Recent Labs     11/21/24 0348 11/22/24  0217 11/23/24  0446   HGB 8.2* 8.5* 9.0*   HCT 26.1* 26.8* 28.5*       Plan  - Monitor serial CBC: Daily  - Transfuse PRBC if patient becomes hemodynamically unstable, symptomatic or H/H drops below 7/21.  - Patient has not received any PRBC transfusions to date  - Patient's anemia is currently stable    Endocrine  Hyponatremia  Hyponatremia is likely due to Heart Failure. The patient's most recent sodium results are listed below.  Recent Labs     11/21/24 0348 11/22/24 0217 11/23/24  0446   * 138 134*       Plan  - Correct the sodium by 4-6mEq in 24 hours.   - Obtain the following studies: Urine sodium, urine osmolality, serum osmolality or TSH, T4.  - Will treat the hyponatremia with diuresis  - Monitor sodium Daily.   - Patient hyponatremia is stable    Type 2 diabetes mellitus  Patient's FSGs are controlled on current medication regimen.  Last A1c reviewed-   Lab Results   Component Value Date    HGBA1C 5.2 11/10/2024     Most recent fingerstick glucose reviewed-   Recent Labs   Lab 11/22/24  1149 11/22/24  1629 11/22/24 2002 11/23/24  0849   POCTGLUCOSE 201* 125* 127* 123*       Current correctional scale  Low  Maintain anti-hyperglycemic dose as follows-   Antihyperglycemics (From admission, onward)      Start     Stop Route Frequency Ordered    11/10/24 1513  insulin aspart U-100 pen 0-5 Units         -- SubQ Before meals & nightly PRN 11/10/24 1414          Hold Oral hypoglycemics while patient is in the hospital.    GI  Gastroesophageal reflux disease  - continue home PPI        Final Active Diagnoses:    Diagnosis Date Noted POA    PRINCIPAL PROBLEM:  Acute on chronic diastolic congestive heart failure [I50.33] 09/30/2022 Yes    Acute on chronic respiratory failure with hypoxia [J96.21] 09/24/2024 Yes    Pleural effusion [J90]  11/12/2024 Yes    Aortic stenosis [I35.0] 02/03/2017 Yes    Permanent atrial fibrillation [I48.21] 07/08/2016 Yes    Anemia [D64.9] 10/03/2024 Yes    Essential hypertension [I10] 04/08/2016 Yes    Type 2 diabetes mellitus [E11.9] 11/24/2014 Yes    Acute cystitis without hematuria [N30.00]  Yes    CAD (coronary artery disease) [I25.10] 10/30/2020 Yes    COPD (chronic obstructive pulmonary disease) [J44.9] 06/13/2017 Yes    Gastroesophageal reflux disease [K21.9]  Yes    Hyponatremia [E87.1] 09/01/2020 Yes    Anxiety [F41.9] 11/14/2024 Yes    HLD (hyperlipidemia) [E78.5] 11/24/2014 Yes    Irritant contact dermatitis due to friction or contact with body fluids, unspecified [L24.A0] 11/15/2024 Yes      Problems Resolved During this Admission:       Discharged Condition: good    Disposition: Home-Health Care Sv    Follow Up:    Patient Instructions:      Ambulatory referral/consult to Acute Care at Home   Standing Status: Future   Referral Priority: Routine Referral Type: Consultation   Referred to Provider: ULISES PROVIDER Requested Specialty: Internal Medicine   Number of Visits Requested: 1     Ambulatory referral/consult to [facility]   Standing Status: Future   Referral Priority: Routine Referral Type: Consultation   Referral Reason: Specialty Services Required   Number of Visits Requested: 1       Significant Diagnostic Studies: Labs: CMP   Recent Labs   Lab 11/22/24 0217 11/23/24 0446    134*   K 4.1 4.3   CL 89* 89*   CO2 39* 35*   GLU 93 99   BUN 21 18   CREATININE 0.7 0.7   CALCIUM 9.0 9.0   PROT 6.0 6.0   ALBUMIN 2.4* 2.4*   BILITOT 0.3 0.3   ALKPHOS 38* 39*   AST 18 17   ALT 11 11   ANIONGAP 10 10    and CBC   Recent Labs   Lab 11/22/24 0217 11/23/24 0446   WBC 5.26 7.63   HGB 8.5* 9.0*   HCT 26.8* 28.5*    323       Pending Diagnostic Studies:       Procedure Component Value Units Date/Time    Sodium [[id number]] Collected: 11/15/24 0855    Order Status: Sent Lab Status:  No result     Specimen: Blood            Medications:  Reconciled Home Medications:      Medication List        START taking these medications      losartan 50 MG tablet  Commonly known as: COZAAR  Take 1 tablet (50 mg total) by mouth once daily.  Start taking on: November 24, 2024     senna 8.6 mg tablet  Commonly known as: SENOKOT  Take 1 tablet by mouth 2 (two) times a day.            CHANGE how you take these medications      carvediloL 12.5 MG tablet  Commonly known as: COREG  Take 1 tablet (12.5 mg total) by mouth 2 (two) times daily with meals.  What changed:   medication strength  how much to take     furosemide 40 MG tablet  Commonly known as: LASIX  TAKE 40MG (1 TABLET) IN THE MORNING AND 20MG (1/2 TABLET) IN THE AFTERNOON  What changed: See the new instructions.            CONTINUE taking these medications      albuterol-ipratropium 2.5 mg-0.5 mg/3 mL nebulizer solution  Commonly known as: DUO-NEB  Take 3 mLs by nebulization every 4 (four) hours as needed for Wheezing or Shortness of Breath.     apixaban 5 mg Tab  Commonly known as: ELIQUIS  Take 1 tablet (5 mg total) by mouth 2 (two) times daily.     ascorbic acid (vitamin C) 500 MG tablet  Commonly known as: VITAMIN C  Take 1,000 mg by mouth once daily.     atorvastatin 10 MG tablet  Commonly known as: LIPITOR  Take 1 tablet (10 mg total) by mouth once daily.     CALTRATE 600 + D ORAL  Take 1 tablet by mouth once daily.     clopidogreL 75 mg tablet  Commonly known as: PLAVIX  Take 1 tablet (75 mg total) by mouth once daily.     DULoxetine 20 MG capsule  Commonly known as: CYMBALTA  Take 1 capsule (20 mg total) by mouth once daily.     estradioL 0.01 % (0.1 mg/gram) vaginal cream  Commonly known as: ESTRACE  Place vaginally.     fexofenadine 60 MG tablet  Commonly known as: ALLEGRA  Take 60 mg by mouth once daily.     gabapentin 300 MG capsule  Commonly known as: NEURONTIN  Take 1 capsule (300 mg total) by mouth every evening.     isosorbide mononitrate  60 MG 24 hr tablet  Commonly known as: IMDUR  Take 1 tablet (60 mg total) by mouth once daily.     metFORMIN 500 MG tablet  Commonly known as: GLUCOPHAGE  Take 1 tablet (500 mg total) by mouth 2 (two) times daily.     montelukast 10 mg tablet  Commonly known as: SINGULAIR  Take 10 mg by mouth once daily.     pantoprazole 40 MG tablet  Commonly known as: PROTONIX  Take 1 tablet (40 mg total) by mouth once daily.     ranolazine 1,000 mg Tb12  Commonly known as: RANEXA  TAKE 1 TABLET BY MOUTH TWICE A DAY     spironolactone 25 MG tablet  Commonly known as: ALDACTONE  Take 1 tablet (25 mg total) by mouth once daily.     SYMBICORT 160-4.5 mcg/actuation [facility]  Generic drug: budesonide-formoterol 160-4.5 mcg  Inhale 2 puffs into the lungs 2 (two) times daily.            STOP taking these medications      LORazepam 0.5 MG tablet  Commonly known as: ATIVAN              Indwelling Lines/Drains at time of discharge:   Lines/Drains/Airways       Drain  Duration             Female External Urinary Catheter w/ Suction 11/23/24 0830 <1 day                    Time spent on the discharge of patient: 35 minutes         Clair Macias MD  Department of Hospital Medicine  [facility] - Stepdown Flex ([place]

## 2024-11-23 NOTE — NURSING
Pt discharged home.  at bedside. All belongings gathered.IV and tele removed. Drsgs changed. RN reviewed discharge instructions with . RX picked up from pharmacy. Nad present at the time of discharge. RN transported pt to vehicle.

## 2024-11-23 NOTE — SUBJECTIVE & OBJECTIVE
Interval History: see above    Review of Systems   Constitutional:  Positive for activity change. Negative for fever.   HENT:  Negative for trouble swallowing.    Respiratory:  Positive for shortness of breath (improved). Negative for cough and choking.    Cardiovascular:  Positive for chest pain. Negative for leg swelling.   Gastrointestinal:  Negative for constipation, diarrhea and nausea.   Genitourinary:  Negative for difficulty urinating.   Musculoskeletal:  Negative for arthralgias and back pain.   Neurological:  Negative for numbness and headaches.   Psychiatric/Behavioral:  Negative for behavioral problems and confusion.      Objective:     Vital Signs (Most Recent):  Temp: 97.7 °F (36.5 °C) (11/23/24 0846)  Pulse: 60 (11/23/24 0846)  Resp: 20 (11/23/24 0846)  BP: (!) 171/72 (11/23/24 0846)  SpO2: 98 % (11/23/24 0846) Vital Signs (24h Range):  Temp:  [97.7 °F (36.5 °C)-98.4 °F (36.9 °C)] 97.7 °F (36.5 °C)  Pulse:  [59-64] 60  Resp:  [18-20] 20  SpO2:  [91 %-100 %] 98 %  BP: (120-171)/(53-72) 171/72     Weight: 93.4 kg (205 lb 14.6 oz)  Body mass index is 35.34 kg/m².    Intake/Output Summary (Last 24 hours) at 11/23/2024 1036  Last data filed at 11/23/2024 0852  Gross per 24 hour   Intake 1078 ml   Output 1800 ml   Net -722 ml      Physical Exam  Constitutional:       General: She is not in acute distress.     Appearance: Normal appearance. She is obese. She is not ill-appearing, toxic-appearing or diaphoretic.   HENT:      Head: Normocephalic and atraumatic.      Nose: Nose normal.      Mouth/Throat:      Mouth: Mucous membranes are moist.      Pharynx: Oropharynx is clear.   Eyes:      General: No scleral icterus.     Extraocular Movements: Extraocular movements intact.      Conjunctiva/sclera: Conjunctivae normal.      Pupils: Pupils are equal, round, and reactive to light.   Cardiovascular:      Rate and Rhythm: Normal rate and regular rhythm.      Pulses: Normal pulses.      Heart sounds: No murmur  heard.     No gallop.   Pulmonary:      Effort: Pulmonary effort is normal. No respiratory distress.      Breath sounds: No stridor. No wheezing, rhonchi or rales (improving).   Chest:      Chest wall: No tenderness.   Abdominal:      General: Abdomen is flat. Bowel sounds are normal. There is no distension.      Palpations: Abdomen is soft.      Tenderness: There is no abdominal tenderness. There is no right CVA tenderness, guarding or rebound.   Musculoskeletal:         General: No swelling, tenderness, deformity or signs of injury. Normal range of motion.      Cervical back: Normal range of motion and neck supple. No rigidity or tenderness.      Right lower leg: No edema.      Left lower leg: No edema.   Skin:     General: Skin is warm and dry.      Coloration: Skin is not jaundiced.      Findings: No erythema or rash.   Neurological:      General: No focal deficit present.      Mental Status: She is alert and oriented to person, place, and time. Mental status is at baseline.      Motor: No weakness.   Psychiatric:         Mood and Affect: Mood normal.         Behavior: Behavior normal.         Thought Content: Thought content normal.         Judgment: Judgment normal.       Computed MELD 3.0 unavailable. One or more values for this score either were not found within the given timeframe or did not fit some other criterion.  Computed MELD-Na unavailable. One or more values for this score either were not found within the given timeframe or did not fit some other criterion.      Significant Labs:  CBC:  Recent Labs   Lab 11/22/24 0217 11/23/24 0446   WBC 5.26 7.63   HGB 8.5* 9.0*   HCT 26.8* 28.5*    323     CMP:  Recent Labs   Lab 11/22/24 0217 11/23/24  0446    134*   K 4.1 4.3   CL 89* 89*   CO2 39* 35*   GLU 93 99   BUN 21 18   CREATININE 0.7 0.7   CALCIUM 9.0 9.0   PROT 6.0 6.0   ALBUMIN 2.4* 2.4*   BILITOT 0.3 0.3   ALKPHOS 38* 39*   AST 18 17   ALT 11 11   ANIONGAP 10 10

## 2024-11-23 NOTE — PLAN OF CARE
[facility] ([place] [id number])      HOME HEALTH ORDERS  FACE TO FACE ENCOUNTER    Patient Name: Adriana Strong  YOB: 1945    PCP: Krzysztof Mcgraw MD   PCP Address: [address] / [place] [address]  PCP Phone Number: [phone]  PCP Fax: [phone]    Encounter Date: 11/10/24    Admit to Home Health    Diagnoses:  Active Hospital Problems    Diagnosis  POA    *Acute on chronic diastolic congestive heart failure [I50.33]  Yes     Priority: 1 - High    Acute on chronic respiratory failure with hypoxia [J96.21]  Yes     Priority: 2     Pleural effusion [J90]  Yes     Priority: 3     Aortic stenosis [I35.0]  Yes     Priority: 3     Permanent atrial fibrillation [I48.21]  Yes     Priority: 3     Anemia [D64.9]  Yes     Priority: 4     Essential hypertension [I10]  Yes     Priority: 4     Type 2 diabetes mellitus [E11.9]  Yes     Priority: 5      A1C 7.0      Acute cystitis without hematuria [N30.00]  Yes     Priority: 6     CAD (coronary artery disease) [I25.10]  Yes     Priority: 7     COPD (chronic obstructive pulmonary disease) [J44.9]  Yes     Priority: 7     Gastroesophageal reflux disease [K21.9]  Yes     Priority: 8     Hyponatremia [E87.1]  Yes     Priority: 10     Anxiety [F41.9]  Yes     Priority: 11     HLD (hyperlipidemia) [E78.5]  Yes     Priority: 12     Irritant contact dermatitis due to friction or contact with body fluids, unspecified [L24.A0]  Yes     Priority: 13       Resolved Hospital Problems   No resolved problems to display.       Follow Up Appointments:  Future Appointments   Date Time Provider Department Center   12/3/2024 10:00 AM Shikha Raymundo MD [facility] CARDIO [facility]   12/6/2024 10:30 AM Alvarez Whitehead MD [facility] NEURO8 [facility]   1/21/2025  3:00 PM Franklin Link MD [facility] PULMO [facility]   2/3/2025  9:40 AM Edilberto Avilez MD [facility] CARDIO [facility]       Allergies:  Review of patient's allergies indicates:   Allergen Reactions    Celebrex  [celecoxib] Shortness Of Breath    Ciprofloxacin Swelling     lip    Dicyclomine Hives    Adhesive Dermatitis    Avelox [moxifloxacin] Swelling    Cilostazol Other (See Comments)     Elevates blood pressure    Eryc [erythromycin] Other (See Comments)     unknown    Iodine and iodide containing products Hives    Keflex [cephalexin] Hives     Tolerated ceftriaxone (11/11/2024)    Meclomen      rashes    Meloxicam      Ear ringing    Metoclopramide Other (See Comments)     High blood pressure    Sulfa (sulfonamide antibiotics) Itching       Medications: Review discharge medications with patient and family and provide education.    Current Facility-Administered Medications   Medication Dose Route Frequency Provider Last Rate Last Admin    acetaminophen tablet 650 mg  650 mg Oral Q4H PRN Clair Macias MD   650 mg at 11/22/24 1730    albuterol-ipratropium 2.5 mg-0.5 mg/3 mL nebulizer solution 3 mL  3 mL Nebulization Q4H PRN Delilah May PA-C   3 mL at 11/22/24 2008    apixaban tablet 5 mg  5 mg Oral BID Tessa Low DO   5 mg at 11/23/24 0858    ascorbic acid (vitamin C) tablet 1,000 mg  1,000 mg Oral Daily Delilah May PA-C   1,000 mg at 11/23/24 0858    atorvastatin tablet 10 mg  10 mg Oral Daily Delilah May PA-C   10 mg at 11/23/24 0858    balsam peru-castor oiL Oint   Topical (Top) BID Amarjit Pierre MD   Given at 11/23/24 0903    carvediloL tablet 12.5 mg  12.5 mg Oral BID  Eryn Hsu MD   12.5 mg at 11/23/24 0858    clopidogreL tablet 75 mg  75 mg Oral Daily Tessa Low DO   75 mg at 11/23/24 0858    dextrose 10% bolus 125 mL 125 mL  12.5 g Intravenous PRN Delilah May PA-C        dextrose 10% bolus 250 mL 250 mL  25 g Intravenous PRN Delilah May PA-C        DULoxetine DR capsule 20 mg  20 mg Oral Daily John Spears MD   20 mg at 11/23/24 0858    furosemide tablet 40 mg  40 mg Oral Q8H Emma Morrison PA-C   40 mg at 11/23/24 0606    gabapentin capsule 300 mg   300 mg Oral QHS Trevon Rogers MD   300 mg at 11/22/24 2139    glucagon (human recombinant) injection 1 mg  1 mg Intramuscular PRN Delilah May PA-C        glucose chewable tablet 16 g  16 g Oral PRN Delilah May PA-C        glucose chewable tablet 24 g  24 g Oral PRN Delilah May PA-C        hydrALAZINE injection 10 mg  10 mg Intravenous Q6H PRN Maggie Garay NP   10 mg at 11/13/24 2140    hydrOXYzine HCL tablet 25 mg  25 mg Oral TID PRN Jesus Farris, DO   25 mg at 11/13/24 1409    insulin aspart U-100 pen 0-5 Units  0-5 Units Subcutaneous QID (AC + HS) PRN Delilah May PA-C        isosorbide mononitrate 24 hr tablet 60 mg  60 mg Oral Daily Delilah May PA-C   60 mg at 11/23/24 0857    LORazepam tablet 0.5 mg  0.5 mg Oral Q6H PRN Tessa Low DO   0.5 mg at 11/23/24 0858    losartan tablet 50 mg  50 mg Oral Daily Eryn Hsu MD   50 mg at 11/23/24 0858    melatonin tablet 6 mg  6 mg Oral Nightly PRN Wale Escalante MD   6 mg at 11/20/24 2118    montelukast tablet 10 mg  10 mg Oral Daily Delilah May PA-C   10 mg at 11/23/24 0858    naloxone 0.4 mg/mL injection 0.02 mg  0.02 mg Intravenous PRN Delilah May PA-C        ondansetron disintegrating tablet 8 mg  8 mg Oral Q8H PRN Delilah May PA-C   8 mg at 11/17/24 2312    pantoprazole EC tablet 40 mg  40 mg Oral Daily Delilah May PA-C   40 mg at 11/23/24 0858    polyethylene glycol packet 17 g  17 g Oral Daily PRN Delilah May PA-C        polyethylene glycol packet 17 g  17 g Oral BID Jesus Farris, DO   17 g at 11/22/24 2100    prochlorperazine injection Soln 5 mg  5 mg Intravenous Q4H PRN Alena Damon MD        ranolazine 12 hr tablet 1,000 mg  1,000 mg Oral BID Delilah May PA-C   1,000 mg at 11/23/24 0858    senna tablet 8.6 mg  8.6 mg Oral BID Jesus Farris DO   8.6 mg at 11/22/24 5436    sodium chloride 0.9% flush 10 mL  10 mL Intravenous PRN Wale Escalante MD        sodium chloride  0.9% flush 10 mL  10 mL Intravenous Q12H PRN Delilah May PA-C        sodium chloride 0.9% flush 10 mL  10 mL Intravenous PRN Delilah May PA-C        spironolactone tablet 25 mg  25 mg Oral Daily Delilah May PA-C   25 mg at 11/23/24 0858    white petrolatum 41 % ointment   Topical (Top) PRN Amarjit Pierre MD   Given at 11/17/24 0829     Facility-Administered Medications Ordered in Other Encounters   Medication Dose Route Frequency Provider Last Rate Last Admin    0.9%  NaCl infusion   Intravenous Continuous Lynette Hightower NP   New Bag at 05/29/24 1440    sodium chloride 0.9% flush 5 mL  5 mL Intravenous PRN Lynette Hightower NP            Medication List        START taking these medications      losartan 50 MG tablet  Commonly known as: COZAAR  Take 1 tablet (50 mg total) by mouth once daily.  Start taking on: November 24, 2024     senna 8.6 mg tablet  Commonly known as: SENOKOT  Take 1 tablet by mouth 2 (two) times a day.            CHANGE how you take these medications      carvediloL 12.5 MG tablet  Commonly known as: COREG  Take 1 tablet (12.5 mg total) by mouth 2 (two) times daily with meals.  What changed:   medication strength  how much to take     furosemide 40 MG tablet  Commonly known as: LASIX  TAKE 40MG (1 TABLET) IN THE MORNING AND 20MG (1/2 TABLET) IN THE AFTERNOON  What changed: See the new instructions.            CONTINUE taking these medications      albuterol-ipratropium 2.5 mg-0.5 mg/3 mL nebulizer solution  Commonly known as: DUO-NEB  Take 3 mLs by nebulization every 4 (four) hours as needed for Wheezing or Shortness of Breath.     apixaban 5 mg Tab  Commonly known as: ELIQUIS  Take 1 tablet (5 mg total) by mouth 2 (two) times daily.     ascorbic acid (vitamin C) 500 MG tablet  Commonly known as: VITAMIN C  Take 1,000 mg by mouth once daily.     atorvastatin 10 MG tablet  Commonly known as: LIPITOR  Take 1 tablet (10 mg total) by mouth once daily.      CALTRATE 600 + D ORAL  Take 1 tablet by mouth once daily.     clopidogreL 75 mg tablet  Commonly known as: PLAVIX  Take 1 tablet (75 mg total) by mouth once daily.     DULoxetine 20 MG capsule  Commonly known as: CYMBALTA  Take 1 capsule (20 mg total) by mouth once daily.     estradioL 0.01 % (0.1 mg/gram) vaginal cream  Commonly known as: ESTRACE  Place vaginally.     fexofenadine 60 MG tablet  Commonly known as: ALLEGRA  Take 60 mg by mouth once daily.     gabapentin 300 MG capsule  Commonly known as: NEURONTIN  Take 1 capsule (300 mg total) by mouth every evening.     isosorbide mononitrate 60 MG 24 hr tablet  Commonly known as: IMDUR  Take 1 tablet (60 mg total) by mouth once daily.     metFORMIN 500 MG tablet  Commonly known as: GLUCOPHAGE  Take 1 tablet (500 mg total) by mouth 2 (two) times daily.     montelukast 10 mg tablet  Commonly known as: SINGULAIR  Take 10 mg by mouth once daily.     pantoprazole 40 MG tablet  Commonly known as: PROTONIX  Take 1 tablet (40 mg total) by mouth once daily.     ranolazine 1,000 mg Tb12  Commonly known as: RANEXA  TAKE 1 TABLET BY MOUTH TWICE A DAY     spironolactone 25 MG tablet  Commonly known as: ALDACTONE  Take 1 tablet (25 mg total) by mouth once daily.     SYMBICORT 160-4.5 mcg/actuation Hfaa  Generic drug: budesonide-formoterol 160-4.5 mcg  Inhale 2 puffs into the lungs 2 (two) times daily.            STOP taking these medications      LORazepam 0.5 MG tablet  Commonly known as: ATIVAN                I have seen and examined this patient within the last 30 days. My clinical findings that support the need for the home health skilled services and home bound status are the following:no   Weakness/numbness causing balance and gait disturbance due to Heart Failure making it taxing to leave home.  Requiring assistive device to leave home due to unsteady gait caused by  Heart Failure.  Medical restrictions requiring assistance of another human to leave home due to   Unstable ambulation and Wound care needs.  Mental confusion making it unsafe for patient to leave home alone due to  Dementia.     Diet:   diabetic diet 2000 calorie        Referrals/ Consults  Physical Therapy to evaluate and treat. Evaluate for home safety and equipment needs; Establish/upgrade home exercise program. Perform / instruct on therapeutic exercises, gait training, transfer training, and Range of Motion.  Occupational Therapy to evaluate and treat. Evaluate home environment for safety and equipment needs. Perform/Instruct on transfers, ADL training, ROM, and therapeutic exercises.  Aide to provide assistance with personal care, ADLs, and vital signs.    Activities:   ambulate in house with assistance    Nursing:   Agency to admit patient within 24 hours of hospital discharge unless specified on physician order or at patient request    SN to complete comprehensive assessment including routine vital signs. Instruct on disease process and s/s of complications to report to MD. Review/verify medication list sent home with the patient at time of discharge  and instruct patient/caregiver as needed. Frequency may be adjusted depending on start of care date.     Skilled nurse to perform up to 3 visits PRN for symptoms related to diagnosis    Notify MD if SBP > 160 or < 90; DBP > 90 or < 50; HR > 120 or < 50; Temp > 101; O2 < 88%; Other:         Ok to schedule additional visits based on staff availability and patient request on consecutive days within the home health episode.    When multiple disciplines ordered:    Start of Care occurs on Sunday - Wednesday schedule remaining discipline evaluations as ordered on separate consecutive days following the start of care.    Thursday SOC -schedule subsequent evaluations Friday and Monday the following week.     Friday - Saturday SOC - schedule subsequent discipline evaluations on consecutive days starting Monday of the following week.    For all post-discharge  communication and subsequent orders please contact patient's primary care physician. If unable to reach primary care physician or do not receive response within 30 minutes, please contact on call MD for clinical staff order clarification    Miscellaneous   Routine Skin for Bedridden Patients: Instruct patient/caregiver to apply moisture barrier cream to all skin folds and wet areas in perineal area daily and after baths and all bowel movements.  Diabetic Care:   SN to perform and educate Diabetic management with blood glucose monitoring:, Fingerstick blood sugar AC and HS, and Report CBG < 60 or > 350 to physician.  Heart Failure:      SN to instruct on the following:    Instruct on the definition of CHF.   Instruct on the signs/sympoms of CHF to be reported.   Instruct on and monitor daily weights.   Instruct on factors that cause exacerbation.   Instruct on action, dose, schedule, and side effects of medications.   Instruct on diet as prescribed.   Instruct on activity allowed.   Instruct on life-style modifications for life long management of CHF   SN to assess compliance with daily weights, diet, medications, fluid retention,    safety precautions, activities permitted and life-style modifications.   Additional 1-2 SN visits per week as needed for signs and symptoms     of CHF exacerbation.      For Weight Gain > 2-3 lbs in 1 day or 4-6 lbs over 1 week notify PCP:  Per heart failure transition care clinic    Home Health Aide:  Nursing Three times weekly, Physical Therapy Three times weekly, Occupational Therapy Three times weekly, and Home Health Aide Three times weekly    Wound Care Orders  Apply BPCO to buttocks BID and PRN if soiled.    Oxygen: for COPD.  home oxygen (2L via [place]).     I certify that this patient is confined to her home and needs intermittent skilled nursing care, physical therapy, and occupational therapy.

## 2024-11-23 NOTE — ASSESSMENT & PLAN NOTE
Patient with Hypoxic Respiratory failure which is Acute on chronic.  she is on home oxygen at 2 LPM. Supplemental oxygen was provided and noted-    Signs/symptoms of respiratory failure include- tachypnea and increased work of breathing. Contributing diagnoses includes - CHF Labs and images were reviewed. Patient Has not had a recent ABG. Will treat underlying causes and adjust management of respiratory failure as follows- diuresis  - Discussed with family that goal O2 should be 88-92% given COPD. They will continue to titrate at home with pulse ox.   SEE above

## 2024-11-23 NOTE — PROGRESS NOTES
[facility] - Stepdown Flex ([place] [id number])  [facility] Medicine  Progress Note    Patient Name: Adriana Strong  MRN: [id number]  Patient Class: IP- Inpatient   Admission Date: 11/10/2024  Length of Stay: 13 days  Attending Physician: Clair Macias MD  Primary Care Provider: Krzysztof Mcgraw MD        Subjective:     Principal Problem:Acute on chronic diastolic congestive heart failure        HPI:  Adriana Strong is a 79 y.o.F with hx of diastolic HF, Afib on elqiuis, s/p pacemaker, HLD, HTN, non obstructive CAD, s/p TAVR, asthma, COPD, on home oxygen (2L via NC),  DM, CVA, CHF, AF and PPM with upgrade to CRT P in May 2024, history of inferior mesenteric revascularization with a 5 mm bare metal stent.  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 Patient reports SOB at rest and with exertion. States she saw cardiology last week where labs were performed. She was notified that her sodium was low and MD requested that patient hold her lasix x3d, continue spironolactone and after the 3d start lasix 40mg daily and additional prn. Otherwise, patient reports compliance with medications, fluid restriction, Na restriction. Also endorsing dysuria for the last couple of days as well as decreased oral intake. Denies fever, chills, chest pain, palpitations, abdominal pain, n/v/d, headaches, or any other symptoms at this time.     In ED: AF, hypertensive, tachypneic on 2-4L via NC. CBC with chronic, stable anemia. Na 128, K 5.6, otherwise CMP unremarkable. Mag 1.5. . Trop negative. TSH 2.559. POC lactate 1.96. UA with 3+ protein, 1+ leukocyte esterase, 17 WBCs, many bacteria, 23 squams. Culture pending. POC US in ED with trace pericardial effusion. CXR with left pleural effusion and congestive change/edema, developing left lower lung zone consolidation not excluded in this hypoventilatory exam. S/p  lasix 80mg inj x2 in ED. Admitted to  for further evaluation    Overview/Hospital Course:  S/p MICU admission for rescue BiPAP, diuresis, and UTI/CAP treatment weaned back to 2L prior to step down. Physical exam without obvious hypervolemia, but pt demonstrated JVD and Echo 11/14 with CVP 15. Continued to use BiPAP QHS and with long naps. Does not have a machine at home, but could benefit from OP Sleep Medicine evaluation. PT/OT evaluation recs for moderate-intensity therapy, but family reports significant family support and motivation to do therapy at home. Continues to be hypervolemic, continuing IV lasix.    11/21-  care for pt at home and dispenses medications.   - BP  126/58 Pulse 59   SpO2 100%   on IV diuresis. Lasix 80 mg iv q BB. Lorsatan, isosorbide, ranolazine, and spironolactone. And apixaban and plavix. Jardiance was stopped for uncertain reasons. Consider convert to po lasix in am     11/22- reporting HA and chest pain. Has dual paced rhythm, no changes on EKG. BP  188/77, Pulse 60 other VSS. Did not receive am meds yet. Mag  1.5- repleted.  UO= 1300. On lasix 80 IV bid. Her ususal home lasix dose ion 40 bid. Will convert to lasix 40 tid and monitor UO today.     11/23- UO on po lasix 40 q 8 is 1550 + unmeasured urinations. Blood pressure 171/72, pulse 60, note BP is before am meds, but goes down each afternoon.  May dc to home.  manages her medications. F/u  [facility] at home. HF transition clinic. And HH.     Interval History: see above    Review of Systems   Constitutional:  Positive for activity change. Negative for fever.   HENT:  Negative for trouble swallowing.    Respiratory:  Positive for shortness of breath (improved). Negative for cough and choking.    Cardiovascular:  Positive for chest pain. Negative for leg swelling.   Gastrointestinal:  Negative for constipation, diarrhea and nausea.   Genitourinary:  Negative for difficulty urinating.   Musculoskeletal:  Negative for  arthralgias and back pain.   Neurological:  Negative for numbness and headaches.   Psychiatric/Behavioral:  Negative for behavioral problems and confusion.      Objective:     Vital Signs (Most Recent):  Temp: 97.7 °F (36.5 °C) (11/23/24 0846)  Pulse: 60 (11/23/24 0846)  Resp: 20 (11/23/24 0846)  BP: (!) 171/72 (11/23/24 0846)  SpO2: 98 % (11/23/24 0846) Vital Signs (24h Range):  Temp:  [97.7 °F (36.5 °C)-98.4 °F (36.9 °C)] 97.7 °F (36.5 °C)  Pulse:  [59-64] 60  Resp:  [18-20] 20  SpO2:  [91 %-100 %] 98 %  BP: (120-171)/(53-72) 171/72     Weight: 93.4 kg (205 lb 14.6 oz)  Body mass index is 35.34 kg/m².    Intake/Output Summary (Last 24 hours) at 11/23/2024 1036  Last data filed at 11/23/2024 0852  Gross per 24 hour   Intake 1078 ml   Output 1800 ml   Net -722 ml      Physical Exam  Constitutional:       General: She is not in acute distress.     Appearance: Normal appearance. She is obese. She is not ill-appearing, toxic-appearing or diaphoretic.   HENT:      Head: Normocephalic and atraumatic.      Nose: Nose normal.      Mouth/Throat:      Mouth: Mucous membranes are moist.      Pharynx: Oropharynx is clear.   Eyes:      General: No scleral icterus.     Extraocular Movements: Extraocular movements intact.      Conjunctiva/sclera: Conjunctivae normal.      Pupils: Pupils are equal, round, and reactive to light.   Cardiovascular:      Rate and Rhythm: Normal rate and regular rhythm.      Pulses: Normal pulses.      Heart sounds: No murmur heard.     No gallop.   Pulmonary:      Effort: Pulmonary effort is normal. No respiratory distress.      Breath sounds: No stridor. No wheezing, rhonchi or rales (improving).   Chest:      Chest wall: No tenderness.   Abdominal:      General: Abdomen is flat. Bowel sounds are normal. There is no distension.      Palpations: Abdomen is soft.      Tenderness: There is no abdominal tenderness. There is no right CVA tenderness, guarding or rebound.   Musculoskeletal:         General:  No swelling, tenderness, deformity or signs of injury. Normal range of motion.      Cervical back: Normal range of motion and neck supple. No rigidity or tenderness.      Right lower leg: No edema.      Left lower leg: No edema.   Skin:     General: Skin is warm and dry.      Coloration: Skin is not jaundiced.      Findings: No erythema or rash.   Neurological:      General: No focal deficit present.      Mental Status: She is alert and oriented to person, place, and time. Mental status is at baseline.      Motor: No weakness.   Psychiatric:         Mood and Affect: Mood normal.         Behavior: Behavior normal.         Thought Content: Thought content normal.         Judgment: Judgment normal.       Computed MELD 3.0 unavailable. One or more values for this score either were not found within the given timeframe or did not fit some other criterion.  Computed MELD-Na unavailable. One or more values for this score either were not found within the given timeframe or did not fit some other criterion.      Significant Labs:  CBC:  Recent Labs   Lab 11/22/24 0217 11/23/24  0446   WBC 5.26 7.63   HGB 8.5* 9.0*   HCT 26.8* 28.5*    323     CMP:  Recent Labs   Lab 11/22/24 0217 11/23/24  0446    134*   K 4.1 4.3   CL 89* 89*   CO2 39* 35*   GLU 93 99   BUN 21 18   CREATININE 0.7 0.7   CALCIUM 9.0 9.0   PROT 6.0 6.0   ALBUMIN 2.4* 2.4*   BILITOT 0.3 0.3   ALKPHOS 38* 39*   AST 18 17   ALT 11 11   ANIONGAP 10 10         Assessment/Plan:      * Acute on chronic diastolic congestive heart failure  Patient has Diastolic (HFpEF) heart failure that is Acute on chronic. On presentation their CHF was decompensated. Evidence of decompensated CHF on presentation includes: edema, elevated JVD, crackles on lung auscultation, weight gain, orthopnea, dyspnea on exertion (WHITLEY), and shortness of breath. The etiology of their decompensation is likely medication adjustments . Patient is on the HF pathway. Most recent BNP and  "echo results are listed below.  No results for input(s): "BNP" in the last 72 hours.    Latest ECHO  Results for orders placed during the hospital encounter of 09/24/24    Echo    Interpretation Summary    Left Ventricle: The left ventricle is normal in size. Normal wall thickness. There is concentric remodeling. There is normal systolic function with a visually estimated ejection fraction of 60 - 65%. Grade III diastolic dysfunction. Elevated left ventricular filling pressure.    Right Ventricle: Normal right ventricular cavity size. Wall thickness is normal. Systolic function is normal. Pacemaker lead present in the ventricle.    The left atrium is severely dilated.    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 It is reported to be a 26mm Phan Suzi S3 valve. Aortic valve area by VTI is 1.90 cm2. Aortic valve peak velocity is 1.64 m/s. Mean gradient is 6 mmHg. The dimensionless index is 0.57. There is mild aortic regurgitation.    Tricuspid Valve: There is mild regurgitation.    Pulmonary Artery: The estimated pulmonary artery systolic pressure is 48 mmHg.    IVC/SVC: Elevated venous pressure at 15 mmHg.    Pericardium: There is a small posterior effusion. No indication of cardiac tamponade.    Current Heart Failure Medications  spironolactone tablet 25 mg, Daily, Oral  hydrALAZINE injection 10 mg, Every 6 hours PRN, Intravenous  carvediloL tablet 12.5 mg, 2 times daily with meals, Oral  losartan tablet 50 mg, Daily, Oral  furosemide tablet 40 mg, Every 8 hours, Oral  - Place on telemetry  - Place on fluid restriction of 1.5 L.   - Cardiology has not been consulted  - Continue IV lasix. Patient still hypervolemic on physical exam, although improved from prior.   - strict intake and output, daily standing weights    Intake/Output Summary (Last 24 hours) at 11/23/2024 1037  Last data filed at 11/23/2024 0852  Gross per 24 hour   Intake 600 ml   Output 1800 ml   Net -1200 ml "       11/21- Lasix 80 mg iv q BB. Lorsatan, isosorbide, ranolazine, and spironolactone. And apixaban and plavix.   11/22- convert to po lasix 40 tid. Reviewed cardiology notes and it is unclear why jardiance was stopped.   11/23- good UO on po lasix. Will have pt f/u cardiology, [facility] at home,  and HF transition clinic.       Acute on chronic respiratory failure with hypoxia  Patient with Hypoxic Respiratory failure which is Acute on chronic.  she is on home oxygen at 2 LPM. Supplemental oxygen was provided and noted-    Signs/symptoms of respiratory failure include- tachypnea and increased work of breathing. Contributing diagnoses includes - CHF Labs and images were reviewed. Patient Has not had a recent ABG. Will treat underlying causes and adjust management of respiratory failure as follows- diuresis  - Discussed with family that goal O2 should be 88-92% given COPD. They will continue to titrate at home with pulse ox.   SEE above      Pleural effusion  Patient found to have small pleural effusion on imaging. I have personally reviewed and interpreted the following imaging: CT. A thoracentesis was performed. Pleural fluid was sent for analysis. Labs reviewed, including  . Fluid most consistent with Transudate.  . Most likely etiology includes Congestive Heart Failure. Management to include Diuresis    - improving      Aortic stenosis  Echocardiogram with evidence of aortic stenosis.The patient's most recent echocardiogram result is listed below. We will manage the valvular abnormality by monitoring.     Echo    Result Date: 11/14/2024    Left Ventricle: The left ventricle is normal in size. Normal wall   thickness. There is normal systolic function with a visually estimated   ejection fraction of 60 - 65%. Ejection fraction is approximately 65%.    Right Ventricle: Normal right ventricular cavity size. Wall thickness   is normal. Systolic function is normal.    Left Atrium: Left atrium is moderately dilated.     Right Atrium: Right atrium is moderately dilated.    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 It is reported to be a 26 mm Phan Suzi valve. Mildly   calcified cusps. Aortic valve area by VTI is 0.8 cm2. Aortic valve peak   velocity is 2.9 m/s. Mean gradient is 21.8 mmHg. The dimensionless index   is 0.25.  msand AcT/ET ratio is 0.36. Findings and trend from prior   echocardiography evaluation is suggestive of prosthetic valve stenosis.   Clinical correlation is required.    Mitral Valve: There is moderate mitral annular calcification present.    Tricuspid Valve: PISA radius is 0.6cm and Vena Contracta is 0.7cm   consistent with moderate to severe regurgitation.    Pulmonary Artery: There is moderate to severe pulmonary hypertension.   The estimated pulmonary artery systolic pressure is 62 mmHg.    IVC/SVC: Elevated venous pressure at 15 mmHg.    Pericardium: There is a trivial circumferential effusion.        Echo    Result Date: 11/11/2024    Left Ventricle: The left ventricle is normal in size. Normal wall   thickness. There is normal systolic function. Ejection fraction is   approximately 65%. There is normal diastolic function.    Right Ventricle: Normal right ventricular cavity size. Wall thickness   is normal. Systolic function is normal. Pacemaker lead present in the   ventricle.    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 It is reported to be a 26 mm Phan valve. The leaflets appear   mildly thickened. Aortic valve area by VTI is 0.7 cm2. LVOT diameter is   1.9 cm. Aortic valve peak velocity is 3.3 m/s. Mean gradient is 33.3 mmHg.   The dimensionless index is 0.26. Mild paravalvular regurgitation.    Mitral Valve: There is moderate mitral annular calcification present.    Tricuspid Valve: There is moderate regurgitation.    Pulmonary Artery: There is pulmonary hypertension. The estimated   pulmonary artery  systolic pressure is 62 mmHg.    IVC/SVC: Intermediate venous pressure at 8 mmHg.        Echo    Result Date: 9/25/2024    Left Ventricle: The left ventricle is normal in size. Normal wall   thickness. There is concentric remodeling. There is normal systolic   function with a visually estimated ejection fraction of 60 - 65%. Grade   III diastolic dysfunction. Elevated left ventricular filling pressure.    Right Ventricle: Normal right ventricular cavity size. Wall thickness   is normal. Systolic function is normal. Pacemaker lead present in the   ventricle.    The left atrium is severely dilated.    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 It is reported to be a 26mm Phan Suzi S3 valve. Aortic   valve area by VTI is 1.90 cm2. Aortic valve peak velocity is 1.64 m/s.   Mean gradient is 6 mmHg. The dimensionless index is 0.57. There is mild   aortic regurgitation.    Tricuspid Valve: There is mild regurgitation.    Pulmonary Artery: The estimated pulmonary artery systolic pressure is   48 mmHg.    IVC/SVC: Elevated venous pressure at 15 mmHg.    Pericardium: There is a small posterior effusion. No indication of   cardiac tamponade.        Echo    Result Date: 2/10/2024    Left Ventricle: The left ventricle is normal in size. Normal wall   thickness. There is concentric remodeling. Regional wall motion   abnormalities present. There is moderately reduced systolic function with   a visually estimated ejection fraction of 35 - 40%. Ejection fraction by   visual approximation is 35%. There is normal diastolic function.    Right Ventricle: Normal right ventricular cavity size. Wall thickness   is normal. Right ventricle wall motion  is normal. Systolic function is   normal. Pacemaker lead present in the ventricle.    Left Atrium: Left atrium is moderately dilated.    Right Atrium: Right atrium is mildly dilated. Lead present in the right   atrium.    Aortic Valve: There is a  transcatheter valve replacement in the aortic   position. Aortic valve area by VTI is 1.03 cm². Aortic valve peak velocity   is 2.07 m/s. Mean gradient is 11 mmHg. The dimensionless index is 0.30.   There is trace aortic regurgitation.    Mitral Valve: There is mild mitral annular calcification present.    Tricuspid Valve: There is mild regurgitation.    Pulmonary Artery: There is mild to moderate pulmonary hypertension. The   estimated pulmonary artery systolic pressure is 47 mmHg.    IVC/SVC: Normal venous pressure at 3 mmHg.        - Continue judicious diuresis given forward-flow dependence for aortic valve stenosis  - Will need OP follow up after hospitalization to discuss post-TAVR aortic valve stenosis    Permanent atrial fibrillation  Patient has permanent atrial fibrillation. Patient is currently in sinus rhythm. [id number] Score: 5. The patients heart rate in the last 24 hours is as follows:  Pulse  Min: 59  Max: 64   Antiarrhythmics:  Coreg 12.5 bid     Anticoagulants  apixaban tablet 5 mg, 2 times daily, Oral    Plan  - Replete lytes with a goal of K>4, Mg >2  - Patient is anticoagulated, see medications listed above.  - Patient's afib is currently controlled    11/22- paced rhythm on EKG, rate 60.   STABLE        Anemia  Anemia is likely due to Iron deficiency. Most recent hemoglobin and hematocrit are listed below.  Recent Labs     11/21/24  0348 11/22/24  0217 11/23/24  0446   HGB 8.2* 8.5* 9.0*   HCT 26.1* 26.8* 28.5*       Plan  - Monitor serial CBC: Daily  - Transfuse PRBC if patient becomes hemodynamically unstable, symptomatic or H/H drops below 7/21.  - Patient has not received any PRBC transfusions to date  - Patient's anemia is currently stable    Essential hypertension  Patients blood pressure range in the last 24 hours was: BP  Min: 95/46  Max: 221/86.The patient's inpatient anti-hypertensive regimen is listed below:  Current Antihypertensives  isosorbide mononitrate 24 hr tablet 60 mg,  Daily, Oral  ranolazine 12 hr tablet 1,000 mg, 2 times daily, Oral  spironolactone tablet 25 mg, Daily, Oral  hydrALAZINE injection 10 mg, Every 6 hours PRN, Intravenous  carvediloL tablet 12.5 mg, 2 times daily with meals, Oral  losartan tablet 50 mg, Daily, Oral  furosemide tablet 40 mg, Every 8 hours, Oral    Plan  - BP is uncontrolled, will adjust as follows: can consider increasing carvedilol if HR can tolerate   - Losartan resumed.     Type 2 diabetes mellitus  Patient's FSGs are controlled on current medication regimen.  Last A1c reviewed-   Lab Results   Component Value Date    HGBA1C 5.2 11/10/2024     Most recent fingerstick glucose reviewed-   Recent Labs   Lab 11/22/24  1149 11/22/24  1629 11/22/24  2002 11/23/24  0849   POCTGLUCOSE 201* 125* 127* 123*       Current correctional scale  Low  Maintain anti-hyperglycemic dose as follows-   Antihyperglycemics (From admission, onward)      Start     Stop Route Frequency Ordered    11/10/24 1513  insulin aspart U-100 pen 0-5 Units         -- SubQ Before meals & nightly PRN 11/10/24 1414          Hold Oral hypoglycemics while patient is in the hospital.    Acute cystitis without hematuria  - patient with reports of dysuria and dark urine noted at bedside  - UA infectious appearing, but with 23 squams  - urine culture with E Fecalis, treated for 5 day course with Zosyn      CAD (coronary artery disease)  - history noted   - continue statin, plavix    COPD (chronic obstructive pulmonary disease)  Patient's COPD is controlled currently.  Patient is currently off COPD Pathway. Continue scheduled inhalers Supplemental oxygen and monitor respiratory status closely.     - prn duonebs  - titrate O2 to 88-92%, discussed with family    Gastroesophageal reflux disease  - continue home PPI      Hyponatremia  Hyponatremia is likely due to Heart Failure. The patient's most recent sodium results are listed below.  Recent Labs     11/21/24  0348 11/22/24  0217 11/23/24  0446    * 138 134*       Plan  - Correct the sodium by 4-6mEq in 24 hours.   - Obtain the following studies: Urine sodium, urine osmolality, serum osmolality or TSH, T4.  - Will treat the hyponatremia with diuresis  - Monitor sodium Daily.   - Patient hyponatremia is stable    Anxiety  - home Lorazepam      HLD (hyperlipidemia)  - continue home statin      Irritant contact dermatitis due to friction or contact with body fluids, unspecified  Sacral wound with appreciated recs by Wound Care      Hypomagnesemia  Patient has Abnormal Magnesium: hypomagnesemia. Will continue to monitor electrolytes closely. Will replace the affected electrolytes and repeat labs to be done after interventions completed. The patient's magnesium results have been reviewed and are listed below.  Recent Labs   Lab 11/10/24  1200   MG 1.5*        Hyperkalemia  The patients most recent potassium results are listed below.  Recent Labs     11/08/24  1015 11/10/24  1200   K 4.7 5.6*     Plan  - Monitor for arrhythmias with EKG and/or continuous telemetry.   - Treat the hyperkalemia with Potassium Binders.   - Monitor potassium: Daily  - The patient's hyperkalemia is stable      VTE Risk Mitigation (From admission, onward)           Ordered     apixaban tablet 5 mg  2 times daily         11/11/24 1535     IP VTE HIGH RISK PATIENT  Once         11/10/24 1414     Reason for No Pharmacological VTE Prophylaxis  Once        Question:  Reasons:  Answer:  Already adequately anticoagulated on oral Anticoagulants    11/10/24 1414     Place sequential compression device  Until discontinued         11/10/24 1414                    Discharge Planning   MIMI: 11/22/2024     Code Status: Full Code   Is the patient medically ready for discharge?:     Reason for patient still in hospital (select all that apply): Patient trending condition  Discharge Plan A: Home Health   Discharge Delays: None known at this time      Clair Macias MD  Department of Hospital  Medicine   [facility] ([place])

## 2024-11-23 NOTE — ASSESSMENT & PLAN NOTE
Anemia is likely due to Iron deficiency. Most recent hemoglobin and hematocrit are listed below.  Recent Labs     11/21/24  0348 11/22/24  0217 11/23/24  0446   HGB 8.2* 8.5* 9.0*   HCT 26.1* 26.8* 28.5*       Plan  - Monitor serial CBC: Daily  - Transfuse PRBC if patient becomes hemodynamically unstable, symptomatic or H/H drops below 7/21.  - Patient has not received any PRBC transfusions to date  - Patient's anemia is currently stable

## 2024-11-23 NOTE — ASSESSMENT & PLAN NOTE
Patient's FSGs are controlled on current medication regimen.  Last A1c reviewed-   Lab Results   Component Value Date    HGBA1C 5.2 11/10/2024     Most recent fingerstick glucose reviewed-   Recent Labs   Lab 11/22/24  1149 11/22/24  1629 11/22/24 2002 11/23/24  0849   POCTGLUCOSE 201* 125* 127* 123*       Current correctional scale  Low  Maintain anti-hyperglycemic dose as follows-   Antihyperglycemics (From admission, onward)    Start     Stop Route Frequency Ordered    11/10/24 1513  insulin aspart U-100 pen 0-5 Units         -- SubQ Before meals & nightly PRN 11/10/24 1414        Hold Oral hypoglycemics while patient is in the hospital.

## 2024-11-23 NOTE — ASSESSMENT & PLAN NOTE
Patient has permanent atrial fibrillation. Patient is currently in sinus rhythm. [id number] Score: 5. The patients heart rate in the last 24 hours is as follows:  Pulse  Min: 59  Max: 64   Antiarrhythmics:  Coreg 12.5 bid     Anticoagulants  apixaban tablet 5 mg, 2 times daily, Oral    Plan  - Replete lytes with a goal of K>4, Mg >2  - Patient is anticoagulated, see medications listed above.  - Patient's afib is currently controlled    11/22- paced rhythm on EKG, rate 60.   STABLE

## 2024-11-23 NOTE — PROGRESS NOTES
The pt's spouse transported her home at d/c. The sw left him a voice message to return the call. The pt's hsp f/u's were added to her AVS. The pt has no further questions or Case Management needs and is clear to d/c.       Future Appointments   Date Time Provider Department Center   12/3/2024 10:00 AM Shikha Raymundo MD [facility] CARDIO [facility]   12/6/2024 10:30 AM Alvarez Whitehead MD [facility] NEURO8 [facility]   1/21/2025  3:00 PM Franklin Link MD [facility] PULMO [facility]   2/3/2025  9:40 AM Edilberto Avilez MD [facility] CARDIO [place]      Ronald ginette - Stepdown Flex ([place]-14)  Discharge Final Note    Primary Care Provider: Krzysztof Mcgraw MD    Expected Discharge Date: 11/23/2024    Final Discharge Note (most recent)       Final Note - 11/23/24 1410          Post-Acute Status    Post-Acute Authorization Home Health     Home Health Status Set-up Complete/Auth obtained                     Important Message from Medicare  Important Message from Medicare regarding Discharge Appeal Rights: Explained to patient/caregiver, Signed/date by patient/caregiver     Date IMM was signed: 11/21/24  Time IMM was signed: 0954    Contact [facility]   Specialty: Home Health Services, Physical Therapy, Occupational Therapy    [address] [place] [address]   Phone: [phone]       Next Steps: Follow up    Instructions: The pt will resume with his current home health agency listed above. The pt's home health nurse will contact the pt with an arrival time.    Shikha Raymunod MD   Specialty: Cardiology    [address] [facility] [address]   Phone: [phone]       Next Steps: Follow up on 12/3/2024    Instructions: 10:00am    Alvarez Whitehead MD   Specialty: Neurology    [address] [facility] [address]   Phone: [phone]       Next Steps: Follow up on 12/6/2024    Instructions: 10:30am

## 2024-11-26 ENCOUNTER — DOCUMENT SCAN (OUTPATIENT)
Dept: HOME HEALTH SERVICES | Facility: HOSPITAL | Age: 79
End: 2024-11-26
Payer: MEDICARE

## 2024-12-03 ENCOUNTER — OFFICE VISIT (OUTPATIENT)
Dept: CARDIOLOGY | Facility: CLINIC | Age: 79
End: 2024-12-03
Payer: MEDICARE

## 2024-12-03 ENCOUNTER — TELEPHONE (OUTPATIENT)
Dept: CARDIOLOGY | Facility: HOSPITAL | Age: 79
End: 2024-12-03
Payer: MEDICARE

## 2024-12-03 ENCOUNTER — LAB VISIT (OUTPATIENT)
Dept: LAB | Facility: HOSPITAL | Age: 79
End: 2024-12-03
Payer: MEDICARE

## 2024-12-03 VITALS
DIASTOLIC BLOOD PRESSURE: 58 MMHG | SYSTOLIC BLOOD PRESSURE: 119 MMHG | OXYGEN SATURATION: 97 % | HEIGHT: 64 IN | WEIGHT: 194 LBS | HEART RATE: 60 BPM | BODY MASS INDEX: 33.12 KG/M2

## 2024-12-03 DIAGNOSIS — Z95.2 S/P TAVR (TRANSCATHETER AORTIC VALVE REPLACEMENT): ICD-10-CM

## 2024-12-03 DIAGNOSIS — I50.32 CHRONIC HEART FAILURE WITH PRESERVED EJECTION FRACTION: ICD-10-CM

## 2024-12-03 DIAGNOSIS — E78.5 HYPERLIPIDEMIA, UNSPECIFIED HYPERLIPIDEMIA TYPE: ICD-10-CM

## 2024-12-03 DIAGNOSIS — I48.0 PAROXYSMAL ATRIAL FIBRILLATION: ICD-10-CM

## 2024-12-03 DIAGNOSIS — I10 ESSENTIAL HYPERTENSION: ICD-10-CM

## 2024-12-03 DIAGNOSIS — E11.69 TYPE 2 DIABETES MELLITUS WITH OTHER SPECIFIED COMPLICATION, UNSPECIFIED WHETHER LONG TERM INSULIN USE: ICD-10-CM

## 2024-12-03 DIAGNOSIS — J96.11 CHRONIC RESPIRATORY FAILURE WITH HYPOXIA: ICD-10-CM

## 2024-12-03 DIAGNOSIS — I07.1 MODERATE TRICUSPID INSUFFICIENCY: ICD-10-CM

## 2024-12-03 DIAGNOSIS — N39.0 RECURRENT UTI: ICD-10-CM

## 2024-12-03 DIAGNOSIS — T82.857A STENOSIS OF PROSTHETIC AORTIC VALVE, INITIAL ENCOUNTER: Primary | ICD-10-CM

## 2024-12-03 LAB
ANION GAP SERPL CALC-SCNC: 10 MMOL/L (ref 8–16)
BUN SERPL-MCNC: 16 MG/DL (ref 8–23)
CALCIUM SERPL-MCNC: 9.1 MG/DL (ref 8.7–10.5)
CHLORIDE SERPL-SCNC: 86 MMOL/L (ref 95–110)
CO2 SERPL-SCNC: 35 MMOL/L (ref 23–29)
CREAT SERPL-MCNC: 0.8 MG/DL (ref 0.5–1.4)
EST. GFR  (NO RACE VARIABLE): >60 ML/MIN/1.73 M^2
GLUCOSE SERPL-MCNC: 82 MG/DL (ref 70–110)
POTASSIUM SERPL-SCNC: 4.5 MMOL/L (ref 3.5–5.1)
SODIUM SERPL-SCNC: 131 MMOL/L (ref 136–145)

## 2024-12-03 PROCEDURE — 1159F MED LIST DOCD IN RCRD: CPT | Mod: CPTII,GC,S$GLB, | Performed by: STUDENT IN AN ORGANIZED HEALTH CARE EDUCATION/TRAINING PROGRAM

## 2024-12-03 PROCEDURE — 99214 OFFICE O/P EST MOD 30 MIN: CPT | Mod: GC,S$GLB,, | Performed by: STUDENT IN AN ORGANIZED HEALTH CARE EDUCATION/TRAINING PROGRAM

## 2024-12-03 PROCEDURE — 1157F ADVNC CARE PLAN IN RCRD: CPT | Mod: CPTII,GC,S$GLB, | Performed by: STUDENT IN AN ORGANIZED HEALTH CARE EDUCATION/TRAINING PROGRAM

## 2024-12-03 PROCEDURE — 1101F PT FALLS ASSESS-DOCD LE1/YR: CPT | Mod: CPTII,GC,S$GLB, | Performed by: STUDENT IN AN ORGANIZED HEALTH CARE EDUCATION/TRAINING PROGRAM

## 2024-12-03 PROCEDURE — 3078F DIAST BP <80 MM HG: CPT | Mod: CPTII,GC,S$GLB, | Performed by: STUDENT IN AN ORGANIZED HEALTH CARE EDUCATION/TRAINING PROGRAM

## 2024-12-03 PROCEDURE — 99999 PR PBB SHADOW E&M-EST. PATIENT-LVL V: CPT | Mod: PBBFAC,GC,, | Performed by: STUDENT IN AN ORGANIZED HEALTH CARE EDUCATION/TRAINING PROGRAM

## 2024-12-03 PROCEDURE — 36415 COLL VENOUS BLD VENIPUNCTURE: CPT | Performed by: STUDENT IN AN ORGANIZED HEALTH CARE EDUCATION/TRAINING PROGRAM

## 2024-12-03 PROCEDURE — 1111F DSCHRG MED/CURRENT MED MERGE: CPT | Mod: CPTII,GC,S$GLB, | Performed by: STUDENT IN AN ORGANIZED HEALTH CARE EDUCATION/TRAINING PROGRAM

## 2024-12-03 PROCEDURE — 3074F SYST BP LT 130 MM HG: CPT | Mod: CPTII,GC,S$GLB, | Performed by: STUDENT IN AN ORGANIZED HEALTH CARE EDUCATION/TRAINING PROGRAM

## 2024-12-03 PROCEDURE — 80048 BASIC METABOLIC PNL TOTAL CA: CPT | Performed by: STUDENT IN AN ORGANIZED HEALTH CARE EDUCATION/TRAINING PROGRAM

## 2024-12-03 PROCEDURE — 3288F FALL RISK ASSESSMENT DOCD: CPT | Mod: CPTII,GC,S$GLB, | Performed by: STUDENT IN AN ORGANIZED HEALTH CARE EDUCATION/TRAINING PROGRAM

## 2024-12-03 NOTE — PROGRESS NOTES
Clinic Note  12/2/2024      Subjective:       Patient ID:  Adriana is a 79 y.o. female being seen for a follow up     Chief Complaint: Follow up      Adriana Strong is an 79 year old female with paroxysmal atrial fibrillation with slow ventricular response (previously on PPM, now with recent upgrade to Bi-V),  HTN, HLD, non-obstructive CAD ( [facility] in 02/2024), HFrEF (EF 35-40%), COPD, chronic hypoxemic respiratory failure on 2L home O2 who presents for f/u. Patient was last seen by me in September and was sent to ER for CHF management. Since then she has has had two additional admissions with the last for CHF.    During the last hospital visit, she was admitted to the MICU for decompensated HF requiring BiPAP. She also underwent thoracentesis with removal of 800cc of transudative fluid. IV diuresis was continued. She was discharged to continue lasix po 40mg AM and 20mg PM but she has been taking 40mg BID. During same stay, TTE showed concerning features of prosthetic valve uptrending to prosthetic valve stenosis ( There is a transcatheter valve replacement in the aortic position. It is reported to be a 26 mm Phan Suzi valve. Mildly calcified cusps. Aortic valve area by VTI is 0.8 cm2. Aortic valve peak velocity is 2.9 m/s. Mean gradient is 21.8 mmHg. The dimensionless index is 0.25.  msand AcT/ET ratio is 0.36. Findings and trend from prior echocardiography). Her daughter reports compliance with her medications including eliquis.     Since discharge, she has felt better. States she has occasional chest pain that is reproducible. Priro cardiac PET in Feb 2024 was negative for ischemia.  She is 194lbs which is lower than her baseline weight. She is adherent to all of her medications. She remains on 2L O2.         Review of Systems   Constitutional:  Negative for chills and fever.   Respiratory:  Negative for shortness of breath.    Cardiovascular:  Positive for chest pain. Negative for leg swelling and PND.    Gastrointestinal: Negative.        Medication List with Changes/Refills   Current Medications    ALBUTEROL-IPRATROPIUM (DUO-NEB) 2.5 MG-0.5 MG/3 ML NEBULIZER SOLUTION    Take 3 mLs by nebulization every 4 (four) hours as needed for Wheezing or Shortness of Breath.    APIXABAN (ELIQUIS) 5 MG TAB    Take 1 tablet (5 mg total) by mouth 2 (two) times daily.    ASCORBIC ACID, VITAMIN C, (VITAMIN C) 500 MG TABLET    Take 1,000 mg by mouth once daily.     ATORVASTATIN (LIPITOR) 10 MG TABLET    Take 1 tablet (10 mg total) by mouth once daily.    CALCIUM CARBONATE/VITAMIN D3 (CALTRATE 600 + D ORAL)    Take 1 tablet by mouth once daily.    CARVEDILOL (COREG) 12.5 MG TABLET    Take 1 tablet (12.5 mg total) by mouth 2 (two) times daily with meals.    CLOPIDOGREL (PLAVIX) 75 MG TABLET    Take 1 tablet (75 mg total) by mouth once daily.    DULOXETINE (CYMBALTA) 20 MG CAPSULE    Take 1 capsule (20 mg total) by mouth once daily.    ESTRADIOL (ESTRACE) 0.01 % (0.1 MG/GRAM) VAGINAL CREAM    Place vaginally.    FEXOFENADINE (ALLEGRA) 60 MG TABLET    Take 60 mg by mouth once daily.    FUROSEMIDE (LASIX) 40 MG TABLET    TAKE 40MG (1 TABLET) IN THE MORNING AND 20MG (1/2 TABLET) IN THE AFTERNOON    GABAPENTIN (NEURONTIN) 300 MG CAPSULE    Take 1 capsule (300 mg total) by mouth every evening.    ISOSORBIDE MONONITRATE (IMDUR) 60 MG 24 HR TABLET    Take 1 tablet (60 mg total) by mouth once daily.    LOSARTAN (COZAAR) 50 MG TABLET    Take 1 tablet (50 mg total) by mouth once daily.    METFORMIN (GLUCOPHAGE) 500 MG TABLET    Take 1 tablet (500 mg total) by mouth 2 (two) times daily.    MONTELUKAST (SINGULAIR) 10 MG TABLET    Take 10 mg by mouth once daily.    PANTOPRAZOLE (PROTONIX) 40 MG TABLET    Take 1 tablet (40 mg total) by mouth once daily.    RANOLAZINE (RANEXA) 1,000 MG TB12    TAKE 1 TABLET BY MOUTH TWICE A DAY    SENNA (SENOKOT) 8.6 MG TABLET    Take 1 tablet by mouth 2 (two) times a day.    SPIRONOLACTONE (ALDACTONE) 25 MG TABLET     Take 1 tablet (25 mg total) by mouth once daily.    SYMBICORT 160-4.5 MCG/ACTUATION HFAA    Inhale 2 puffs into the lungs 2 (two) times daily.       Patient Active Problem List   Diagnosis    Enlarged ovary    Type 2 diabetes mellitus    Severe persistent asthma with acute exacerbation    HLD (hyperlipidemia)    GERD (gastroesophageal reflux disease)    Chronic mesenteric ischemia    Mesenteric artery insufficiency    Gastroesophageal reflux disease    Essential hypertension    Pure hypercholesterolemia    Permanent atrial fibrillation    Old lacunar stroke without late effect    COPD exacerbation    Aortic stenosis    Incisional hernia, without obstruction or gangrene    UTI (urinary tract infection)    COPD (chronic obstructive pulmonary disease)    Costochondritis    Diabetic polyneuropathy associated with type 2 diabetes mellitus    DDD (degenerative disc disease), lumbar    Cataract    Balance problems    Decreased range of motion of trunk and back    Lumbar spondylosis    Neuroforaminal stenosis of lumbar spine    Spinal stenosis of lumbar region with neurogenic claudication    Lumbar radiculopathy    S/P TAVR (transcatheter aortic valve replacement)    Obesity    Vasovagal syncope    TACOS (acute kidney injury)    Atrial flutter    Hyponatremia    Dysuria    Acute cystitis without hematuria    CAD (coronary artery disease)    Asthma in adult    Chronic heart failure with preserved ejection fraction    Localized edema    History of transcatheter aortic valve replacement (TAVR)    Coronary artery disease involving native coronary artery of native heart without angina pectoris    Aortic atherosclerosis    Acute on chronic diastolic congestive heart failure    Occlusion of superior mesenteric artery    Pacemaker    Chest discomfort    Severe obesity with body mass index (BMI) of 35.0 to 39.9 with comorbidity    Acute on chronic respiratory failure with hypoxia     "Anemia    Hyperkalemia    Hypomagnesemia    Pleural effusion    Anxiety    Irritant contact dermatitis due to friction or contact with body fluids, unspecified           Objective:      BP (!) 119/58 (Patient Position: Sitting)   Pulse 60   Ht 5' 4" (1.626 m)   Wt 88 kg (194 lb)   LMP 01/21/1973 (LMP Unknown)   SpO2 97%   BMI 33.30 kg/m²   Estimated body mass index is 35.34 kg/m² as calculated from the following:    Height as of 11/14/24: 5' 4" (1.626 m).    Weight as of 11/22/24: 93.4 kg (205 lb 14.6 oz).  Physical Exam  Constitutional:       General: She is not in acute distress.     Appearance: She is normal weight. She is not toxic-appearing or diaphoretic.   HENT:      Head: Normocephalic.      Mouth/Throat:      Mouth: Mucous membranes are moist.   Eyes:      Extraocular Movements: Extraocular movements intact.   Neck:      Vascular: Hepatojugular reflux present. No JVD.   Cardiovascular:      Rate and Rhythm: Normal rate and regular rhythm.      Pulses: Normal pulses.      Heart sounds: Murmur heard.      No gallop.   Pulmonary:      Effort: Pulmonary effort is normal.   Abdominal:      General: Abdomen is flat. Bowel sounds are normal. There is no distension.      Tenderness: There is no abdominal tenderness.   Musculoskeletal:         General: Swelling (Bilateral 1+ pitting edema) present. No tenderness. Normal range of motion.   Skin:     General: Skin is warm.   Neurological:      Mental Status: She is alert and oriented to person, place, and time. Mental status is at baseline.   Psychiatric:         Mood and Affect: Mood normal.         Behavior: Behavior normal.         Thought Content: Thought content normal.         Assessment and Plan:   Adriana was seen today for follow-up.    Diagnoses and all orders for this visit:    Stenosis of prosthetic aortic valve, initial encounter  S/P TAVR (transcatheter aortic valve replacement)  Patient has a 26 mm Phan Suzi valve. Mildly calcified " cusps. Aortic valve area by VTI is 0.8 cm2. Aortic valve peak velocity is 2.9 m/s. Mean gradient is 21.8 mmHg. The dimensionless index is 0.25.  msand AcT/ET ratio is 0.36. Findings and trend from prior echocardiography when compared to TTE from September. Given such a sudden change in hemodynamics of the valve, there is concern for valve thrombosis. Patient reports strict adherence to eliquis. We will obtain PEDRO to further evaluate the aortic valve as well as tricuspid valve (showed moderate TR with leads in place).   -     Transesophageal echo (PEDRO); Future    Chronic respiratory failure with hypoxia  -     On 2L of home Oxygen     Essential hypertension  Controlled. Continue current antihypertensive regimen.     Chronic heart failure with preserved ejection fraction  She looks very close to euvolemic today. Will continue lasix regimen. Dose adjustments with weight changes were discussed. She is followed by the [facility] as well.  BMP today as requested by her PCP showed Na 131, though her Na is very variable, ranges in 130s. We discussed for her BMP to be rechecked by PCP toro  few days.  -     BASIC METABOLIC PANEL; Future    Hyperlipidemia, unspecified hyperlipidemia type  Continue statin. LDL is within goal.     Type 2 diabetes mellitus with other specified complication, unspecified whether long term insulin use  Managed by PCP.      Paroxysmal atrial fibrillation  Continue eliquis  She has CRT-P in place and is Bi-V paced  Last device check in September was reviewed.     Recurrent UTI  -     Ambulatory referral/consult to Urology; Future    Moderate tricuspid insufficiency  Noted on TTE. Will evaluate with PEDRO            Follow Up:   Follow up in about 13 weeks (around 3/4/2025).        Plan discussed with attending Dr. Parrish, further recommendations as per attending addendum. Please feel free to call with any questions or concerns.        Shikha Raymundo MD  Cardiovascular Disease  Fellow Physician -  [id number]

## 2024-12-03 NOTE — TELEPHONE ENCOUNTER
Dr. Raymundo spoke with me in person in regards to scheduling patient. Dr. Raymundo to perform PEDRO and confirmed soonest date she is available as 12/11 @ 10:00. Confirmed with patient via phone and booked PEDRO.     Reminded patient for strict 8-hour fasting time (so NPO at midnight) and ok to take meds except metformin as contrast agent is sometimes used with PEDRO. Instructed patient of prep appointment at [facility] on 3rd floor of medical [facility] and to arrive at 8:00 for this. Patient verbalized understanding and voiced no complaints.

## 2024-12-05 ENCOUNTER — TELEPHONE (OUTPATIENT)
Dept: CARDIOLOGY | Facility: CLINIC | Age: 79
End: 2024-12-05
Payer: MEDICARE

## 2024-12-05 DIAGNOSIS — I10 ESSENTIAL HYPERTENSION: ICD-10-CM

## 2024-12-05 NOTE — TELEPHONE ENCOUNTER
Both dr Munoz and home health nurse were updated on dr Raymundo's instructions and they both verbalized understanding. Tammy did add up that she also notified the acute care team (they called her today).

## 2024-12-05 NOTE — TELEPHONE ENCOUNTER
----- Message from SILVINO Cabrera sent at 12/5/2024 11:09 AM CST -----  Regarding: FW: herrera Salter updated per secure messaging. She stated that pt should stay on 40 bid and she also stated:    Yes. If the weight goes up and if she feels more short of breath or increased swelling in legs. She can take an additional 40mg until she feels better. If no improvement then she should let us know.  ----- Message -----  From: Deborah Lutz RN  Sent: 12/5/2024  10:18 AM CST  To: Deborah Lutz, SILVINO; Shikha Raymundo MD  Subject: crackles                                         Spoke w. home health nurse:  Pt w. coarse crackles to bases today, new since last week.  Sats 100% on 2lpm. Weight has been decreasing but also pt w. poor appetite.  Swelling to lower extremities/ feet.  SOB w. activity  Spoke w. daughter. Pt last seen in clinic on 12/3.   Per daughter no change. Wt coming down, edema and SOB unchanged, no reason to worry. Also states that pt is covered by the acute care program if issues.    Pt does take furosemide 40 bid.    Date -- Weight  11/28  196.6  29-Nov -- 196  30-Nov -- 195  01-Dec -- 195.4  02-Dec -- 195.4  03-Dec -- 194.2  04-Dec -- 195.2  05-Dec -- 193.8    Please advise and also if no change in furosemide, will change you a prescription reflecting the current dose.  ----- Message -----  From: Sanya Doll  Sent: 12/5/2024   9:54 AM CST  To: Deborah Lutz RN    Patient Home Health nurse Kerrie would like to speak with someone regarding crackles in lungs, SOB, oxygen is fine, no weight gain and no swelling. She can be reached at [phone].        Thank you

## 2024-12-05 NOTE — TELEPHONE ENCOUNTER
----- Message from SILVINO Cabrera sent at 12/5/2024 11:09 AM CST -----  Regarding: FW: herrera Salter updated per secure messaging. She stated that pt should stay on 40 bid and she also stated:    Yes. If the weight goes up and if she feels more short of breath or increased swelling in legs. She can take an additional 40mg until she feels better. If no improvement then she should let us know.  ----- Message -----  From: Deborah Lutz RN  Sent: 12/5/2024  10:18 AM CST  To: Deborah Lutz, SILVINO; Shikha Raymundo MD  Subject: crackles                                         Spoke w. home health nurse:  Pt w. coarse crackles to bases today, new since last week.  Sats 100% on 2lpm. Weight has been decreasing but also pt w. poor appetite.  Swelling to lower extremities/ feet.  SOB w. activity  Spoke w. daughter. Pt last seen in clinic on 12/3.   Per daughter no change. Wt coming down, edema and SOB unchanged, no reason to worry. Also states that pt is covered by the acute care program if issues.    Pt does take furosemide 40 bid.    Date -- Weight  11/28  196.6  29-Nov -- 196  30-Nov -- 195  01-Dec -- 195.4  02-Dec -- 195.4  03-Dec -- 194.2  04-Dec -- 195.2  05-Dec -- 193.8    Please advise and also if no change in furosemide, will change you a prescription reflecting the current dose.  ----- Message -----  From: Sanya Doll  Sent: 12/5/2024   9:54 AM CST  To: Deborah Lutz RN    Patient Home Health nurse Kerrie would like to speak with someone regarding crackles in lungs, SOB, oxygen is fine, no weight gain and no swelling. She can be reached at [phone].        Thank you

## 2024-12-06 RX ORDER — FUROSEMIDE 40 MG/1
40 TABLET ORAL 2 TIMES DAILY
Qty: 180 TABLET | Refills: 3 | Status: SHIPPED | OUTPATIENT
Start: 2024-12-06

## 2024-12-11 ENCOUNTER — ANESTHESIA EVENT (OUTPATIENT)
Dept: MEDSURG UNIT | Facility: HOSPITAL | Age: 79
End: 2024-12-11
Payer: MEDICARE

## 2024-12-11 ENCOUNTER — ANESTHESIA (OUTPATIENT)
Dept: MEDSURG UNIT | Facility: HOSPITAL | Age: 79
End: 2024-12-11
Payer: MEDICARE

## 2024-12-11 ENCOUNTER — HOSPITAL ENCOUNTER (OUTPATIENT)
Facility: HOSPITAL | Age: 79
Discharge: HOME | End: 2024-12-11
Attending: INTERNAL MEDICINE | Admitting: INTERNAL MEDICINE
Payer: MEDICARE

## 2024-12-11 ENCOUNTER — HOSPITAL ENCOUNTER (OUTPATIENT)
Dept: CARDIOLOGY | Facility: HOSPITAL | Age: 79
Discharge: HOME | End: 2024-12-11
Attending: STUDENT IN AN ORGANIZED HEALTH CARE EDUCATION/TRAINING PROGRAM | Admitting: INTERNAL MEDICINE
Payer: MEDICARE

## 2024-12-11 VITALS
BODY MASS INDEX: 32.61 KG/M2 | OXYGEN SATURATION: 95 % | SYSTOLIC BLOOD PRESSURE: 104 MMHG | WEIGHT: 191 LBS | TEMPERATURE: 97 F | DIASTOLIC BLOOD PRESSURE: 51 MMHG | RESPIRATION RATE: 18 BRPM | HEIGHT: 64 IN | HEART RATE: 60 BPM

## 2024-12-11 VITALS
HEIGHT: 64 IN | WEIGHT: 191 LBS | BODY MASS INDEX: 32.61 KG/M2 | DIASTOLIC BLOOD PRESSURE: 57 MMHG | SYSTOLIC BLOOD PRESSURE: 121 MMHG

## 2024-12-11 DIAGNOSIS — T82.857A STENOSIS OF PROSTHETIC AORTIC VALVE, INITIAL ENCOUNTER: ICD-10-CM

## 2024-12-11 DIAGNOSIS — Z95.0 PACEMAKER: Primary | ICD-10-CM

## 2024-12-11 LAB
ANION GAP SERPL CALC-SCNC: 10 MMOL/L (ref 8–16)
ASCENDING AORTA: 3 CM
BSA FOR ECHO PROCEDURE: 1.98 M2
BUN SERPL-MCNC: 30 MG/DL (ref 8–23)
CALCIUM SERPL-MCNC: 8.8 MG/DL (ref 8.7–10.5)
CHLORIDE SERPL-SCNC: 89 MMOL/L (ref 95–110)
CO2 SERPL-SCNC: 33 MMOL/L (ref 23–29)
CREAT SERPL-MCNC: 0.9 MG/DL (ref 0.5–1.4)
EST. GFR  (NO RACE VARIABLE): >60 ML/MIN/1.73 M^2
GLUCOSE SERPL-MCNC: 115 MG/DL (ref 70–110)
PISA TR MAX VEL: 2.3 M/S
POCT GLUCOSE: 138 MG/DL (ref 70–110)
POCT GLUCOSE: 44 MG/DL (ref 70–110)
POTASSIUM SERPL-SCNC: 5.1 MMOL/L (ref 3.5–5.1)
SINUS: 2.2 CM
SODIUM SERPL-SCNC: 132 MMOL/L (ref 136–145)
STJ: 2 CM
TR MAX PG: 21 MMHG

## 2024-12-11 PROCEDURE — 93312 ECHO TRANSESOPHAGEAL: CPT | Mod: 26,,, | Performed by: INTERNAL MEDICINE

## 2024-12-11 PROCEDURE — 82962 GLUCOSE BLOOD TEST: CPT

## 2024-12-11 PROCEDURE — 37000009 HC ANESTHESIA EA ADD 15 MINS

## 2024-12-11 PROCEDURE — 25000003 PHARM REV CODE 250: Performed by: NURSE ANESTHETIST, CERTIFIED REGISTERED

## 2024-12-11 PROCEDURE — 93320 DOPPLER ECHO COMPLETE: CPT | Mod: 26,,, | Performed by: INTERNAL MEDICINE

## 2024-12-11 PROCEDURE — 80048 BASIC METABOLIC PNL TOTAL CA: CPT | Performed by: STUDENT IN AN ORGANIZED HEALTH CARE EDUCATION/TRAINING PROGRAM

## 2024-12-11 PROCEDURE — 93320 DOPPLER ECHO COMPLETE: CPT

## 2024-12-11 PROCEDURE — 93325 DOPPLER ECHO COLOR FLOW MAPG: CPT | Mod: 26,,, | Performed by: INTERNAL MEDICINE

## 2024-12-11 PROCEDURE — 63600175 PHARM REV CODE 636 W HCPCS: Performed by: NURSE ANESTHETIST, CERTIFIED REGISTERED

## 2024-12-11 PROCEDURE — 37000008 HC ANESTHESIA 1ST 15 MINUTES

## 2024-12-11 RX ORDER — PROPOFOL 10 MG/ML
VIAL (ML) INTRAVENOUS CONTINUOUS PRN
Status: DISCONTINUED | OUTPATIENT
Start: 2024-12-11 | End: 2024-12-11

## 2024-12-11 RX ORDER — SODIUM CHLORIDE 0.9 % (FLUSH) 0.9 %
10 SYRINGE (ML) INJECTION
Status: DISCONTINUED | OUTPATIENT
Start: 2024-12-11 | End: 2024-12-11 | Stop reason: HOSPADM

## 2024-12-11 RX ORDER — SODIUM CHLORIDE 0.9 % (FLUSH) 0.9 %
3 SYRINGE (ML) INJECTION
Status: DISCONTINUED | OUTPATIENT
Start: 2024-12-11 | End: 2024-12-11 | Stop reason: HOSPADM

## 2024-12-11 RX ORDER — LIDOCAINE HYDROCHLORIDE 20 MG/ML
SOLUTION OROPHARYNGEAL
Status: DISCONTINUED | OUTPATIENT
Start: 2024-12-11 | End: 2024-12-11

## 2024-12-11 RX ORDER — LIDOCAINE HYDROCHLORIDE 20 MG/ML
INJECTION, SOLUTION EPIDURAL; INFILTRATION; INTRACAUDAL; PERINEURAL
Status: DISCONTINUED | OUTPATIENT
Start: 2024-12-11 | End: 2024-12-11

## 2024-12-11 RX ORDER — GLUCAGON 1 MG
1 KIT INJECTION
Status: DISCONTINUED | OUTPATIENT
Start: 2024-12-11 | End: 2024-12-11 | Stop reason: HOSPADM

## 2024-12-11 RX ADMIN — SODIUM CHLORIDE: 9 INJECTION, SOLUTION INTRAVENOUS at 10:12

## 2024-12-11 RX ADMIN — PROPOFOL 25 MG: 10 INJECTION, EMULSION INTRAVENOUS at 10:12

## 2024-12-11 RX ADMIN — LIDOCAINE HYDROCHLORIDE 5 ML: 20 SOLUTION ORAL at 10:12

## 2024-12-11 RX ADMIN — PROPOFOL 125 MCG/KG/MIN: 10 INJECTION, EMULSION INTRAVENOUS at 10:12

## 2024-12-11 RX ADMIN — LIDOCAINE HYDROCHLORIDE 60 MG: 20 INJECTION, SOLUTION EPIDURAL; INFILTRATION; INTRACAUDAL at 10:12

## 2024-12-11 NOTE — DISCHARGE INSTRUCTIONS
Medications:  -Continue to take your home medications as listed on your medication list after you are discharged. No changes have been made today.    Follow up: in clinic with Chico Avilez and Zackary as scheduled.    Diet  -You may resume oral intake after you are discharged, as long you have no swallowing difficulties.    Because you have received sedation for this procedure:  -Limit activity for the remainder of the day.  -Do not smoke for at least 6 hours and until you are fully awake and alert.  -Do not drink alcoholic beverage for 24 hours.  -Do not drive for 24 hours.  -Defer important decision making until the following day.     Go to the Emergency Department if you develop:   -Bleeding  -Weakness or numbness  -Visual, gait or speech disturbance  -New chest pain, palpitations, shortness of breath, rapid heart beat, or fainting  -Fever

## 2024-12-11 NOTE — NURSING
Received report from SILVINO Michele. Patient s/p AAOx4. VSS, no c/o pain or discomfort at this time, resp even and unlabored. Post procedure protocol reviewed with patient and patient's family. Understanding verbalized. Family members at bedside. Nurse call bell within reach.

## 2024-12-11 NOTE — ANESTHESIA POSTPROCEDURE EVALUATION
Anesthesia Post Evaluation    Patient: Adriana Strong    Procedure(s) Performed: Procedure(s) (LRB):  Transesophageal echo (PEDRO) intra-procedure log documentation (N/A)    Final Anesthesia Type: general      Patient location during evaluation: PACU  Patient participation: Yes- Able to Participate  Level of consciousness: awake and alert and oriented  Post-procedure vital signs: reviewed and stable  Pain management: adequate  Airway patency: patent    PONV status at discharge: No PONV  Anesthetic complications: no      Cardiovascular status: blood pressure returned to baseline and hemodynamically stable  Respiratory status: unassisted  Hydration status: euvolemic  Follow-up not needed.              Vitals Value Taken Time   /55 12/11/24 1133   Temp 36.2 °C (97.2 °F) 12/11/24 1110   Pulse 60 12/11/24 1145   Resp 18 12/11/24 1145   SpO2 96 % 12/11/24 1145   Vitals shown include unfiled device data.      No case tracking events are documented in the log.      Pain/Naresh Score: Naresh Score: 10 (12/11/2024 12:30 PM)

## 2024-12-11 NOTE — HOSPITAL COURSE
Successful PEDRO. Patient tolerated the procedure without any acute complications. Plan to continue all home medications as prescribed. Instructed to follow up with Dr. Raymundo and Dr. Avilez outpatient.   Patient was assessed at bedside prior to discharge, they reported feeling well and denied chest discomfort, shortness of breath, palpitations, lightheadedness, or any other acute symptoms. Discharge instructions were discussed with patient and all questions were answered. Patient was discharged home in stable condition.

## 2024-12-11 NOTE — DISCHARGE SUMMARY
[facility] - [facility]  Cardiology  Discharge Summary      Patient Name: Adriana Strong  MRN: [id number]  Admission Date: 12/11/2024  Hospital Length of Stay: 0 days  Discharge Date and Time:  12/11/2024 12:00 PM  Attending Physician: Nikko Almonte MD    Discharging Provider: Nida Villalpando PA-C  Primary Care Physician: Krzysztof Mcgraw MD    HPI:    Adriana Strong is an 79 year old female with paroxysmal atrial fibrillation with slow ventricular response (previously on PPM, now with recent upgrade to Bi-V),  HTN, HLD, non-obstructive CAD ( [facility] in 02/2024), HFrEF (EF 35-40%), COPD, chronic hypoxemic respiratory failure on 2L home O2 who presents for PEDRO for bioprosthetic valve stenosis evaluation. Patient was last seen by me in September and was sent to ER for CHF management. Since then she has has had two additional admissions with the last for CHF.     During the last hospital visit, she was admitted to the MICU for decompensated HF requiring BiPAP. She also underwent thoracentesis with removal of 800cc of transudative fluid. IV diuresis was continued. She was discharged to continue lasix po 40mg AM and 20mg PM but she has been taking 40mg BID. During same stay, TTE showed concerning features of prosthetic valve uptrending to prosthetic valve stenosis ( There is a transcatheter valve replacement in the aortic position. It is reported to be a 26 mm Phan Suzi valve. Mildly calcified cusps. Aortic valve area by VTI is 0.8 cm2. Aortic valve peak velocity is 2.9 m/s. Mean gradient is 21.8 mmHg. The dimensionless index is 0.25.  msand AcT/ET ratio is 0.36. Findings and trend from prior echocardiography). Her daughter reports compliance with her medications including eliquis.          Procedure(s) (LRB):  Transesophageal echo (PEDRO) intra-procedure log documentation (N/A)       Indwelling Lines/Drains at time of discharge:  None      Hospital Course:  Successful PEDRO. Per Dr. Raymundo, aortic valve with  degeneration and will require referral to interventional cardiology for consideration of Hernan TAVR.  Patient tolerated the procedure without any acute complications. Plan to continue all home medications as prescribed. Instructed to follow up with Dr. Raymundo and Dr. Avilez outpatient.   Patient was assessed at bedside prior to discharge, they reported feeling well and denied chest discomfort, shortness of breath, palpitations, lightheadedness, or any other acute symptoms. Discharge instructions were discussed with patient and all questions were answered. Patient was discharged home in stable condition.         Goals of Care Treatment Preferences:  Code Status: Full Code    Living Will: Yes            Significant Diagnostic Studies: PEDRO - final report pending     Pending Diagnostic Studies:       None            There are no hospital problems to display for this patient.    No new Assessment & Plan notes have been filed under this hospital service since the last note was generated.  Service: Cardiology      Discharged Condition: stable    Disposition: Home or Self Care    Follow Up: Dr. Raymundo and Dr. Avilez as scheduled.    Patient Instructions:   No discharge procedures on file.  Medications:  Reconciled Home Medications:      Medication List        CONTINUE taking these medications      albuterol-ipratropium 2.5 mg-0.5 mg/3 mL nebulizer solution  Commonly known as: DUO-NEB  Take 3 mLs by nebulization every 4 (four) hours as needed for Wheezing or Shortness of Breath.     apixaban 5 mg Tab  Commonly known as: ELIQUIS  Take 1 tablet (5 mg total) by mouth 2 (two) times daily.     ascorbic acid (vitamin C) 500 MG tablet  Commonly known as: VITAMIN C  Take 1,000 mg by mouth once daily.     atorvastatin 10 MG tablet  Commonly known as: LIPITOR  Take 1 tablet (10 mg total) by mouth once daily.     CALTRATE 600 + D ORAL  Take 1 tablet by mouth once daily.     carvediloL 12.5 MG tablet  Commonly known as: COREG  Take 1 tablet  (12.5 mg total) by mouth 2 (two) times daily with meals.     clopidogreL 75 mg tablet  Commonly known as: PLAVIX  Take 1 tablet (75 mg total) by mouth once daily.     DULoxetine 20 MG capsule  Commonly known as: CYMBALTA  Take 1 capsule (20 mg total) by mouth once daily.     estradioL 0.01 % (0.1 mg/gram) vaginal cream  Commonly known as: ESTRACE  Place vaginally.     fexofenadine 60 MG tablet  Commonly known as: ALLEGRA  Take 60 mg by mouth once daily.     furosemide 40 MG tablet  Commonly known as: LASIX  Take 1 tablet (40 mg total) by mouth 2 (two) times a day.     gabapentin 300 MG capsule  Commonly known as: NEURONTIN  Take 1 capsule (300 mg total) by mouth every evening.     isosorbide mononitrate 60 MG 24 hr tablet  Commonly known as: IMDUR  Take 1 tablet (60 mg total) by mouth once daily.     losartan 50 MG tablet  Commonly known as: COZAAR  Take 1 tablet (50 mg total) by mouth once daily.     metFORMIN 500 MG tablet  Commonly known as: GLUCOPHAGE  Take 1 tablet (500 mg total) by mouth 2 (two) times daily.     montelukast 10 mg tablet  Commonly known as: SINGULAIR  Take 10 mg by mouth once daily.     pantoprazole 40 MG tablet  Commonly known as: PROTONIX  Take 1 tablet (40 mg total) by mouth once daily.     ranolazine 1,000 mg Tb12  Commonly known as: RANEXA  TAKE 1 TABLET BY MOUTH TWICE A DAY     spironolactone 25 MG tablet  Commonly known as: ALDACTONE  Take 1 tablet (25 mg total) by mouth once daily.     SYMBICORT 160-4.5 mcg/actuation Hfaa  Generic drug: budesonide-formoterol 160-4.5 mcg  Inhale 2 puffs into the lungs 2 (two) times daily.            ASK your doctor about these medications      senna 8.6 mg tablet  Commonly known as: SENOKOT  Take 1 tablet by mouth 2 (two) times a day.              Time spent on the discharge of patient: 35 minutes    Nida Villalpando PA-C  Cardiology  [facility] - [facility]

## 2024-12-11 NOTE — NURSING
Patient discharged per MD orders. Instructions given on medications, wound care, activity, signs of infection, when to call MD, and follow up appointments. Pt and family verbalized understanding. PIV removed. Family used wheelchair to transfer pt off of unit.

## 2024-12-11 NOTE — CARE UPDATE
Device was interrogated and patient's underlying rhythm showed atrial fibrillation with CHB. Patient has chronic atrial fibrillation. Discussed PEDRO findings with family and Dr. LINETTE Parrish. Will refer to Interventional for Hernan TAVR evaluation given  CHF symptoms. No definitive thrombus on PEDRO, however FATIMAH sludge noted with decreased velocities while in atrial fibrillation. We will continue apixaban for now. Patient was advised to maintain medication adherence.     All questions and concerns were addressed.    Shikha Raymundo MD  Cardiovascular Disease PGY6  [facility]

## 2024-12-11 NOTE — HPI
Adriana Strong is an 79 year old female with paroxysmal atrial fibrillation with slow ventricular response (previously on PPM, now with recent upgrade to Bi-V),  HTN, HLD, non-obstructive CAD ( [facility] in 02/2024), HFrEF (EF 35-40%), COPD, chronic hypoxemic respiratory failure on 2L home O2 who presents for PEDRO for bioprosthetic valve stenosis evaluation. Patient was last seen by me in September and was sent to ER for CHF management. Since then she has has had two additional admissions with the last for CHF.     During the last hospital visit, she was admitted to the MICU for decompensated HF requiring BiPAP. She also underwent thoracentesis with removal of 800cc of transudative fluid. IV diuresis was continued. She was discharged to continue lasix po 40mg AM and 20mg PM but she has been taking 40mg BID. During same stay, TTE showed concerning features of prosthetic valve uptrending to prosthetic valve stenosis ( There is a transcatheter valve replacement in the aortic position. It is reported to be a 26 mm Phan Suzi valve. Mildly calcified cusps. Aortic valve area by VTI is 0.8 cm2. Aortic valve peak velocity is 2.9 m/s. Mean gradient is 21.8 mmHg. The dimensionless index is 0.25.  msand AcT/ET ratio is 0.36. Findings and trend from prior echocardiography). Her daughter reports compliance with her medications including eliquis.

## 2024-12-11 NOTE — ANESTHESIA PREPROCEDURE EVALUATION
12/11/2024  [facility]  Anesthesia Pre-Operative Evaluation         Patient Name: Adriana Strong  YOB: 1945  MRN: [id number]    SUBJECTIVE:     Pre-operative evaluation for Procedure(s) (LRB):  Transesophageal echo (PEDRO) intra-procedure log documentation (N/A)     12/11/2024    Adriana Strong is a 79 y.o. female w/ a pertinent PMHx of pAF, AS s/p 26 mm Suzi TAVR (recent DI 0.25, MISTY 0.8), normal LVEF, diastolic HF, PASP 60's, severe COPD (PFT with no obstruction, moderate restriction), here for PEDRO to evaluate TAVR valve.      Full medical history listed below      LDA: None documented.    Prev airway: None documented.     GTTs: None documented.        Patient Active Problem List   Diagnosis    Enlarged ovary    Type 2 diabetes mellitus    Severe persistent asthma with acute exacerbation    HLD (hyperlipidemia)    GERD (gastroesophageal reflux disease)    Chronic mesenteric ischemia    Mesenteric artery insufficiency    Gastroesophageal reflux disease    Essential hypertension    Pure hypercholesterolemia    Permanent atrial fibrillation    Old lacunar stroke without late effect    COPD exacerbation    Aortic stenosis    Incisional hernia, without obstruction or gangrene    UTI (urinary tract infection)    COPD (chronic obstructive pulmonary disease)    Costochondritis    Diabetic polyneuropathy associated with type 2 diabetes mellitus    DDD (degenerative disc disease), lumbar    Cataract    Balance problems    Decreased range of motion of trunk and back    Lumbar spondylosis    Neuroforaminal stenosis of lumbar spine    Spinal stenosis of lumbar region with neurogenic claudication    Lumbar radiculopathy    S/P TAVR (transcatheter aortic valve replacement)    Obesity    Vasovagal syncope    TACOS (acute kidney injury)    Atrial flutter    Hyponatremia    Dysuria    Acute  cystitis without hematuria    CAD (coronary artery disease)    Asthma in adult    Chronic heart failure with preserved ejection fraction    Localized edema    History of transcatheter aortic valve replacement (TAVR)    Coronary artery disease involving native coronary artery of native heart without angina pectoris    Aortic atherosclerosis    Acute on chronic diastolic congestive heart failure    Occlusion of superior mesenteric artery    Pacemaker    Chest discomfort    Severe obesity with body mass index (BMI) of 35.0 to 39.9 with comorbidity    Acute on chronic respiratory failure with hypoxia    Anemia    Hyperkalemia    Hypomagnesemia    Pleural effusion    Anxiety    Irritant contact dermatitis due to friction or contact with body fluids, unspecified       Review of patient's allergies indicates:   Allergen Reactions    Celebrex [celecoxib] Shortness Of Breath    Ciprofloxacin Swelling     lip    Dicyclomine Hives    Adhesive Dermatitis    Avelox [moxifloxacin] Swelling    Cilostazol Other (See Comments)     Elevates blood pressure    Eryc [erythromycin] Other (See Comments)     unknown    Iodine and iodide containing products Hives    Keflex [cephalexin] Hives     Tolerated ceftriaxone (11/11/2024)    Meclomen      rashes    Meloxicam      Ear ringing    Metoclopramide Other (See Comments)     High blood pressure    Sulfa (sulfonamide antibiotics) Itching       Current Inpatient Medications:      Current Facility-Administered Medications on File Prior to Encounter   Medication Dose Route Frequency Provider Last Rate Last Admin    0.9%  NaCl infusion   Intravenous Continuous Lynette Hightower NP   New Bag at 05/29/24 1440    sodium chloride 0.9% flush 5 mL  5 mL Intravenous PRN Lynette Hightower NP         Current Outpatient Medications on File Prior to Encounter   Medication Sig Dispense Refill    albuterol-ipratropium (DUO-NEB) 2.5 mg-0.5 mg/3 mL nebulizer solution Take 3 mLs by nebulization every  4 (four) hours as needed for Wheezing or Shortness of Breath.      apixaban (ELIQUIS) 5 mg Tab Take 1 tablet (5 mg total) by mouth 2 (two) times daily. 180 tablet 3    ascorbic acid, vitamin C, (VITAMIN C) 500 MG tablet Take 1,000 mg by mouth once daily.       calcium carbonate/vitamin D3 (CALTRATE 600 + D ORAL) Take 1 tablet by mouth once daily.      carvediloL (COREG) 12.5 MG tablet Take 1 tablet (12.5 mg total) by mouth 2 (two) times daily with meals. 180 tablet 3    clopidogreL (PLAVIX) 75 mg tablet Take 1 tablet (75 mg total) by mouth once daily. 90 tablet 3    estradioL (ESTRACE) 0.01 % (0.1 mg/gram) vaginal cream Place vaginally.      fexofenadine (ALLEGRA) 60 MG tablet Take 60 mg by mouth once daily.      gabapentin (NEURONTIN) 300 MG capsule Take 1 capsule (300 mg total) by mouth every evening. 30 capsule 11    losartan (COZAAR) 50 MG tablet Take 1 tablet (50 mg total) by mouth once daily. 90 tablet 3    montelukast (SINGULAIR) 10 mg tablet Take 10 mg by mouth once daily.      pantoprazole (PROTONIX) 40 MG tablet Take 1 tablet (40 mg total) by mouth once daily. 90 tablet 3    ranolazine (RANEXA) 1,000 mg Tb12 TAKE 1 TABLET BY MOUTH TWICE A  tablet 3    spironolactone (ALDACTONE) 25 MG tablet Take 1 tablet (25 mg total) by mouth once daily. 30 tablet 11    SYMBICORT 160-4.5 mcg/actuation HFAA Inhale 2 puffs into the lungs 2 (two) times daily.      atorvastatin (LIPITOR) 10 MG tablet Take 1 tablet (10 mg total) by mouth once daily. 30 tablet 0    DULoxetine (CYMBALTA) 20 MG capsule Take 1 capsule (20 mg total) by mouth once daily. 30 capsule 0    isosorbide mononitrate (IMDUR) 60 MG 24 hr tablet Take 1 tablet (60 mg total) by mouth once daily. 30 tablet 0    metFORMIN (GLUCOPHAGE) 500 MG tablet Take 1 tablet (500 mg total) by mouth 2 (two) times daily. 60 tablet 0    senna (SENOKOT) 8.6 mg tablet Take 1 tablet by mouth 2 (two) times a day. (Patient not taking: Reported on 12/3/2024) 60 tablet 11        Past Surgical History:   Procedure Laterality Date    ABDOMINAL SURGERY      stents to SMA    angiocele      2007, 2014    AORTIC VALVE REPLACEMENT N/A 11/19/2019    Procedure: Replacement-valve-aortic;  Surgeon: Jack Capone MD;  Location: [facility] CATH LAB;  Service: Cardiology;  Laterality: N/A;    BREAST SURGERY      left--- a lump--- no cancer    CARDIAC VALVE SURGERY      COLONOSCOPY  2014    COLONOSCOPY N/A 3/14/2018    Procedure: COLONOSCOPY;  Surgeon: Efren Kemp MD;  Location: [facility] ENDO ([address] [place] [address]);  Service: Endoscopy;  Laterality: N/A;  ok to hold Plavix 5 days prior to procedure per Dr RESHMA Hernandez     ok per Dr Kemp to hold Eliquis 2 days prior to procedure (see telephone encounter dated-2/7/18)    CORONARY ANGIOGRAPHY N/A 2/15/2024    Procedure: ANGIOGRAM, CORONARY ARTERY;  Surgeon: Jos Baptiste MD;  Location: [facility] CATH LAB;  Service: Cardiology;  Laterality: N/A;    HERNIA REPAIR      HYSTERECTOMY  partial    1982 partial hysterectomy    IMPLANTATION OF BIVENTRICULAR PERMANENT PACEMAKER AS UPGRADE TO EXISTING PACEMAKER Left 5/29/2024    Procedure: Biventricular pacemaker upgrade;  Surgeon: Chencho Moeller MD;  Location: [facility] EP LAB;  Service: Cardiology;  Laterality: Left;  CHF, AF,  CRTP ugrd, BIO, MAC, MD, 3prep ** BIO dcPPM in situ/ Contrast Prep**    LEFT HEART CATHETERIZATION Right 11/5/2019    Procedure: Left heart cath;  Surgeon: Jack Capone MD;  Location: [facility] CATH LAB;  Service: Cardiology;  Laterality: Right;    left nasal polyp      left neck lymph node      nose polyp      PHLEBOGRAPHY Left 5/1/2024    Procedure: Venogram;  Surgeon: Chencho Moeller MD;  Location: [facility] EP LAB;  Service: Cardiology;  Laterality: Left;  HF, Venogram ( Lt arm) , MD, 3 prep ** BIO dcPPM in situ/ Contrast Prep**    right hip fatty mass tissue      stent to small intestine      SUPERIOR VENA CAVA ANGIOPLASTY / STENTING      TONSILLECTOMY      TOTAL ABDOMINAL HYSTERECTOMY      2014    TOTAL KNEE  "ARTHROPLASTY Bilateral     TREATMENT OF CARDIAC ARRHYTHMIA N/A 2/10/2020    Procedure: CARDIOVERSION;  Surgeon: Jayy Parrish MD;  Location: [facility] EP LAB;  Service: Cardiology;  Laterality: N/A;  AF, PEDRO, DCCV, MAC, DM, 3 Prep    TREATMENT OF CARDIAC ARRHYTHMIA N/A 5/15/2020    Procedure: CARDIOVERSION;  Surgeon: Hany Bee MD;  Location: [facility] EP LAB;  Service: Cardiology;  Laterality: N/A;  AF, PEDRO, DCCV, MAC, DM, 3 Prep    UPPER GASTROINTESTINAL ENDOSCOPY  2014       Social History     Socioeconomic History    Marital status:    Tobacco Use    Smoking status: Never    Smokeless tobacco: Never   Substance and Sexual Activity    Alcohol use: No    Drug use: Never    Sexual activity: Not Currently     Partners: Male     Social Drivers of Health     Financial Resource Strain: Low Risk  (11/10/2024)    Overall Financial Resource Strain (CARDIA)     Difficulty of Paying Living Expenses: Not hard at all   Food Insecurity: No Food Insecurity (11/10/2024)    Hunger Vital Sign     Worried About Running Out of Food in the Last Year: Never true     Ran Out of Food in the Last Year: Never true   Transportation Needs: No Transportation Needs (11/10/2024)    TRANSPORTATION NEEDS     Transportation : No   Physical Activity: Inactive (11/10/2024)    Exercise Vital Sign     Days of Exercise per Week: 0 days     Minutes of Exercise per Session: 0 min   Stress: Patient Declined (11/10/2024)    [employer] - Occupational Stress Questionnaire     Feeling of Stress : Patient declined   Housing Stability: Low Risk  (11/10/2024)    Housing Stability Vital Sign     Unable to Pay for Housing in the Last Year: No     Homeless in the Last Year: No       OBJECTIVE:     Vital Signs Range (Last 24H):  Temp:  [36.5 °C (97.7 °F)]   Pulse:  [60]   Resp:  [18]   BP: (121-131)/(57-62)   SpO2:  [99 %]       CBC:   No results for input(s): "WBC", "RBC", "HGB", "HCT", "PLT", "MCV", "MCH", "MCHC" in the last 72 " "hours.    CMP: No results for input(s): "NA", "K", "CL", "CO2", "BUN", "CREATININE", "GLU", "MG", "PHOS", "CALCIUM", "ALBUMIN", "PROT", "ALKPHOS", "ALT", "AST", "BILITOT" in the last 72 hours.    INR:  No results for input(s): "PT", "INR", "PROTIME", "APTT" in the last 72 hours.    Diagnostic Studies: No relevant studies.    EKG:   Results for orders placed or performed during the hospital encounter of 11/10/24   EKG 12-lead    Collection Time: 11/22/24  7:57 AM   Result Value Ref Range    QRS Duration 96 ms    [facility] QTC Calculation 436 ms    Narrative    Test Reason : R07.9,    Vent. Rate :  60 BPM     Atrial Rate :  60 BPM     P-R Int :    ms          QRS Dur :  96 ms      QT Int : 436 ms       P-R-T Axes :  29  -1 102 degrees    QTcB Int : 436 ms    AV dual-paced rhythm  Abnormal ECG  When compared with ECG of 13-Nov-2024 15:26,  No significant change was found  Confirmed by Marck Fisher ([id number]) on 11/22/2024 11:21:01 AM    Referred By: AAAREFERRAL SELF           Confirmed By: Marck Fisher        2D ECHO:   Results for orders placed during the hospital encounter of 11/10/24    Echo    Interpretation Summary    Left Ventricle: The left ventricle is normal in size. Normal wall thickness. There is normal systolic function with a visually estimated ejection fraction of 60 - 65%. Ejection fraction is approximately 65%.    Right Ventricle: Normal right ventricular cavity size. Wall thickness is normal. Systolic function is normal.    Left Atrium: Left atrium is moderately dilated.    Right Atrium: Right atrium is moderately dilated.    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 It is reported to be a 26 mm Phan Suzi valve. Mildly calcified cusps. Aortic valve area by VTI is 0.8 cm2. Aortic valve peak velocity is 2.9 m/s. Mean gradient is 21.8 mmHg. The dimensionless index is 0.25.  msand AcT/ET ratio is 0.36. Findings and trend from prior echocardiography evaluation is " suggestive of prosthetic valve stenosis. Clinical correlation is required.    Mitral Valve: There is moderate mitral annular calcification present.    Tricuspid Valve: PISA radius is 0.6cm and Vena Contracta is 0.7cm consistent with moderate to severe regurgitation.    Pulmonary Artery: There is moderate to severe pulmonary hypertension. The estimated pulmonary artery systolic pressure is 62 mmHg.    IVC/SVC: Elevated venous pressure at 15 mmHg.    Pericardium: There is a trivial circumferential effusion.         ASSESSMENT/PLAN:           Pre-op Assessment    I have reviewed the Patient Summary Reports.     I have reviewed the Nursing Notes. I have reviewed the NPO Status.   I have reviewed the Medications.     Review of Systems  Anesthesia Hx:   History of prior surgery of interest to airway management or planning:          Denies Family Hx of Anesthesia complications.    Denies Personal Hx of Anesthesia complications.                        Physical Exam  General: Well nourished, Cooperative, Alert and Oriented    Airway:  Mallampati: III   Mouth Opening: Normal  TM Distance: Normal  Tongue: Normal  Neck ROM: Normal ROM    Dental:  Intact    Chest/Lungs:  Clear to auscultation, Normal Respiratory Rate    Heart:  Rate: Normal  Rhythm: Regular Rhythm        Anesthesia Plan  Type of Anesthesia, risks & benefits discussed:    Anesthesia Type: Gen Natural Airway  Intra-op Monitoring Plan: Standard ASA Monitors  Post Op Pain Control Plan: multimodal analgesia and IV/PO Opioids PRN  Induction:  IV  Airway Plan: Video  Informed Consent: Informed consent signed with the Patient and all parties understand the risks and agree with anesthesia plan.  All questions answered.   ASA Score: 4  Day of Surgery Review of History & Physical: H&P Update referred to the surgeon/provider.    Ready For Surgery From Anesthesia Perspective.     .

## 2024-12-11 NOTE — TRANSFER OF CARE
"Anesthesia Transfer of Care Note    Patient: Adriana Strong    Procedure(s) Performed: Procedure(s) (LRB):  Transesophageal echo (PEDRO) intra-procedure log documentation (N/A)    Patient location: PACU    Anesthesia Type: general    Transport from OR: Transported from OR on 2-3 L/min O2 by NC with adequate spontaneous ventilation    Post pain: adequate analgesia    Post assessment: no apparent anesthetic complications and tolerated procedure well    Post vital signs: stable    Level of consciousness: sedated    Nausea/Vomiting: no nausea/vomiting    Complications: none    Transfer of care protocol was followed      Last vitals: Visit Vitals  /62   Pulse 60   Temp 36.5 °C (97.7 °F) (Temporal)   Resp 18   Ht 5' 4" (1.626 m)   Wt 86.6 kg (191 lb)   LMP 01/21/1973 (LMP Unknown)   SpO2 99%   Breastfeeding No   BMI 32.79 kg/m²     "

## 2024-12-11 NOTE — H&P
[facility]  PEDRO Consult      Adriana Strong  YOB: 1945  Medical Record Number:  [id number]  Attending Physician:  Nikko Almonte MD   Date of Admission: 12/11/2024       Hospital Day:  0  Current Principal Problem:  Prosthetic valve stenosis      History     Cc: Dyspnea    HPI   Adriana Strong is an 79 year old female with paroxysmal atrial fibrillation with slow ventricular response (previously on PPM, now with recent upgrade to Bi-V),  HTN, HLD, non-obstructive CAD ( [facility] in 02/2024), HFrEF (EF 35-40%), COPD, chronic hypoxemic respiratory failure on 2L home O2 who presents for PEDRO for bioprosthetic valve stenosis evaluation. Patient was last seen by me in September and was sent to ER for CHF management. Since then she has has had two additional admissions with the last for CHF.     During the last hospital visit, she was admitted to the MICU for decompensated HF requiring BiPAP. She also underwent thoracentesis with removal of 800cc of transudative fluid. IV diuresis was continued. She was discharged to continue lasix po 40mg AM and 20mg PM but she has been taking 40mg BID. During same stay, TTE showed concerning features of prosthetic valve uptrending to prosthetic valve stenosis ( There is a transcatheter valve replacement in the aortic position. It is reported to be a 26 mm Phan Suzi valve. Mildly calcified cusps. Aortic valve area by VTI is 0.8 cm2. Aortic valve peak velocity is 2.9 m/s. Mean gradient is 21.8 mmHg. The dimensionless index is 0.25.  msand AcT/ET ratio is 0.36. Findings and trend from prior echocardiography). Her daughter reports compliance with her medications including eliquis.           No notes on file    Today, she feels fine, stronger and has no complaints      Anticoagulant/antiplatelets: eliquis, clopidogrel       Dysphagia or odynophagia:  no  Liver Disease, esophageal disease, or known varices:  no  Upper GI Bleeding:  no  Snoring:  no  Sleep Apnea:  no  Prior neck surgery or radiation:  no  History of anesthetic difficulties:  no  Family history of anesthetic difficulties:  no  Last oral intake: last pm   Able to move neck in all directions: yes  Platelet count: 323K  INR: 1.1        Medications - Outpatient  Prior to Admission medications    Medication Sig Start Date End Date Taking? Authorizing Provider   albuterol-ipratropium (DUO-NEB) 2.5 mg-0.5 mg/3 mL nebulizer solution Take 3 mLs by nebulization every 4 (four) hours as needed for Wheezing or Shortness of Breath. 10/26/20  Yes Provider, Historical   apixaban (ELIQUIS) 5 mg Tab Take 1 tablet (5 mg total) by mouth 2 (two) times daily. 5/1/24  Yes Harley Espino MD   ascorbic acid, vitamin C, (VITAMIN C) 500 MG tablet Take 1,000 mg by mouth once daily.    Yes Provider, Historical   calcium carbonate/vitamin D3 (CALTRATE 600 + D ORAL) Take 1 tablet by mouth once daily.   Yes Provider, Historical   carvediloL (COREG) 12.5 MG tablet Take 1 tablet (12.5 mg total) by mouth 2 (two) times daily with meals. 11/23/24 11/23/25 Yes Clair Macias MD   clopidogreL (PLAVIX) 75 mg tablet Take 1 tablet (75 mg total) by mouth once daily. 8/14/24  Yes Nate Landis MD   estradioL (ESTRACE) 0.01 % (0.1 mg/gram) vaginal cream Place vaginally. 8/26/24  Yes Provider, Historical   fexofenadine (ALLEGRA) 60 MG tablet Take 60 mg by mouth once daily.   Yes Provider, Historical   furosemide (LASIX) 40 MG tablet Take 1 tablet (40 mg total) by mouth 2 (two) times a day. 12/6/24  Yes Shikha Raymundo MD   gabapentin (NEURONTIN) 300 MG capsule Take 1 capsule (300 mg total) by mouth every evening. 10/4/24 10/4/25 Yes Cody Napoles MD   losartan (COZAAR) 50 MG tablet Take 1 tablet (50 mg total) by mouth once daily. 11/24/24 11/24/25 Yes Clair Macias MD   montelukast (SINGULAIR) 10 mg tablet Take 10 mg by mouth once daily. 3/4/21  Yes Provider, Historical   pantoprazole (PROTONIX) 40 MG tablet  Take 1 tablet (40 mg total) by mouth once daily. 8/12/22  Yes Ruddy Funes MD   ranolazine (RANEXA) 1,000 mg Tb12 TAKE 1 TABLET BY MOUTH TWICE A DAY 10/18/24  Yes Edilberto Avilez MD   spironolactone (ALDACTONE) 25 MG tablet Take 1 tablet (25 mg total) by mouth once daily. 11/8/24 11/8/25 Yes Edilberto Avilez MD   SYMBICORT 160-4.5 mcg/actuation HFAA Inhale 2 puffs into the lungs 2 (two) times daily. 10/23/24  Yes Provider, Historical   atorvastatin (LIPITOR) 10 MG tablet Take 1 tablet (10 mg total) by mouth once daily. 9/26/23 10/15/24  Larry Agee MD   DULoxetine (CYMBALTA) 20 MG capsule Take 1 capsule (20 mg total) by mouth once daily. 9/26/23 9/25/24  Larry Agee MD   isosorbide mononitrate (IMDUR) 60 MG 24 hr tablet Take 1 tablet (60 mg total) by mouth once daily. 9/26/23 10/15/24  Larry Agee MD   metFORMIN (GLUCOPHAGE) 500 MG tablet Take 1 tablet (500 mg total) by mouth 2 (two) times daily. 9/26/23 10/15/24  Larry Agee MD   senna (SENOKOT) 8.6 mg tablet Take 1 tablet by mouth 2 (two) times a day.  Patient not taking: Reported on 12/3/2024 11/23/24   Clair Macias MD         Medications - Current  Scheduled Meds:  Continuous Infusions:  PRN Meds:.  Current Facility-Administered Medications:     sodium chloride 0.9%, 10 mL, Intravenous, PRN      Allergies  Review of patient's allergies indicates:   Allergen Reactions    Celebrex [celecoxib] Shortness Of Breath    Ciprofloxacin Swelling     lip    Dicyclomine Hives    Adhesive Dermatitis    Avelox [moxifloxacin] Swelling    Cilostazol Other (See Comments)     Elevates blood pressure    Eryc [erythromycin] Other (See Comments)     unknown    Iodine and iodide containing products Hives    Keflex [cephalexin] Hives     Tolerated ceftriaxone (11/11/2024)    Meclomen      rashes    Meloxicam      Ear ringing    Metoclopramide Other (See Comments)     High blood pressure    Sulfa (sulfonamide antibiotics) Itching         Past Medical  History  Past Medical History:   Diagnosis Date    Acute on chronic diastolic (congestive) heart failure 11/20/2019    Anticoagulant long-term use     on Plavix since May 2015    Anxiety     Arthritis     Asthma     Atrial fibrillation     Atrial fibrillation with RVR 10/19/2020    Cataracts, bilateral     Chest pain, atypical     Chronic atrial fibrillation with rapid ventricular response 06/23/2017    Colon polyp     Coronary artery disease     Diabetes mellitus     Dry eyes     Esophageal erosions     General anesthetics causing adverse effect in therapeutic use     GERD (gastroesophageal reflux disease)     Heart murmur     Hemorrhoid     High cholesterol     Hypertension     Irritable bowel syndrome     Paroxysmal atrial fibrillation 10/30/2020    Persistent atrial fibrillation 02/10/2020    Shingles 2015    Stenosis of aortic and mitral valves     Stroke     TIA (transient ischemic attack)     Use of cane as ambulatory aid          Past Surgical History  Past Surgical History:   Procedure Laterality Date    ABDOMINAL SURGERY      stents to SMA    angiocele      2007, 2014    AORTIC VALVE REPLACEMENT N/A 11/19/2019    Procedure: Replacement-valve-aortic;  Surgeon: Jack Capone MD;  Location: [facility] CATH LAB;  Service: Cardiology;  Laterality: N/A;    BREAST SURGERY      left--- a lump--- no cancer    CARDIAC VALVE SURGERY      COLONOSCOPY  2014    COLONOSCOPY N/A 3/14/2018    Procedure: COLONOSCOPY;  Surgeon: Efren Kemp MD;  Location: [facility] ENDO (4TH FLR);  Service: Endoscopy;  Laterality: N/A;  ok to hold Plavix 5 days prior to procedure per Dr RESHMA Hernandez     ok per Dr Kemp to hold Eliquis 2 days prior to procedure (see telephone encounter dated-2/7/18)    CORONARY ANGIOGRAPHY N/A 2/15/2024    Procedure: ANGIOGRAM, CORONARY ARTERY;  Surgeon: Jos Baptiste MD;  Location: [facility] CATH LAB;  Service: Cardiology;  Laterality: N/A;    HERNIA REPAIR      HYSTERECTOMY  partial    1982 partial hysterectomy     IMPLANTATION OF BIVENTRICULAR PERMANENT PACEMAKER AS UPGRADE TO EXISTING PACEMAKER Left 5/29/2024    Procedure: Biventricular pacemaker upgrade;  Surgeon: Chencho Moeller MD;  Location: [facility] EP LAB;  Service: Cardiology;  Laterality: Left;  CHF, AF,  CRTP ugrd, BIO, MAC, AZ, 3prep ** BIO dcPPM in situ/ Contrast Prep**    LEFT HEART CATHETERIZATION Right 11/5/2019    Procedure: Left heart cath;  Surgeon: Jack Capone MD;  Location: [facility] CATH LAB;  Service: Cardiology;  Laterality: Right;    left nasal polyp      left neck lymph node      nose polyp      PHLEBOGRAPHY Left 5/1/2024    Procedure: Venogram;  Surgeon: Chencho Moeller MD;  Location: [facility] EP LAB;  Service: Cardiology;  Laterality: Left;  HF, Venogram ( Lt arm) , AZ, 3 prep ** BIO dcPPM in situ/ Contrast Prep**    right hip fatty mass tissue      stent to small intestine      SUPERIOR VENA CAVA ANGIOPLASTY / STENTING      TONSILLECTOMY      TOTAL ABDOMINAL HYSTERECTOMY      2014    TOTAL KNEE ARTHROPLASTY Bilateral     TREATMENT OF CARDIAC ARRHYTHMIA N/A 2/10/2020    Procedure: CARDIOVERSION;  Surgeon: Jayy Parrish MD;  Location: [facility] EP LAB;  Service: Cardiology;  Laterality: N/A;  AF, PEDRO, DCCV, MAC, DM, 3 Prep    TREATMENT OF CARDIAC ARRHYTHMIA N/A 5/15/2020    Procedure: CARDIOVERSION;  Surgeon: Hany Bee MD;  Location: [facility] EP LAB;  Service: Cardiology;  Laterality: N/A;  AF, PEDRO, DCCV, MAC, DM, 3 Prep    UPPER GASTROINTESTINAL ENDOSCOPY  2014         Social History  Social History     Socioeconomic History    Marital status:    Tobacco Use    Smoking status: Never    Smokeless tobacco: Never   Substance and Sexual Activity    Alcohol use: No    Drug use: Never    Sexual activity: Not Currently     Partners: Male     Social Drivers of Health     Financial Resource Strain: Low Risk  (11/10/2024)    Overall Financial Resource Strain (CARDIA)     Difficulty of Paying Living Expenses: Not hard at all   Food Insecurity: No Food  "Insecurity (11/10/2024)    Hunger Vital Sign     Worried About Running Out of Food in the Last Year: Never true     Ran Out of Food in the Last Year: Never true   Transportation Needs: No Transportation Needs (11/10/2024)    TRANSPORTATION NEEDS     Transportation : No   Physical Activity: Inactive (11/10/2024)    Exercise Vital Sign     Days of Exercise per Week: 0 days     Minutes of Exercise per Session: 0 min   Stress: Patient Declined (11/10/2024)    [employer] - Occupational Stress Questionnaire     Feeling of Stress : Patient declined   Housing Stability: Low Risk  (11/10/2024)    Housing Stability Vital Sign     Unable to Pay for Housing in the Last Year: No     Homeless in the Last Year: No         ROS  10 point ROS performed and negative except as stated in HPI     Physical Examination         Vital Signs  Vitals  Temp: 97.7 °F (36.5 °C)  Temp Source: Temporal  Pulse: 60  Heart Rate Source: Monitor  Resp: 18  SpO2: 99 %  Flow (L/min) (Oxygen Therapy): 2  BP: 131/62  MAP (mmHg): 82  BP Location: Left arm  BP Method: Automatic  Patient Position: Lying          24 Hour VS Range    Temp:  [97.7 °F (36.5 °C)]   Pulse:  [60]   Resp:  [18]   BP: (121-131)/(57-62)   SpO2:  [99 %]   No intake or output data in the 24 hours ending 12/11/24 0910      Physical Exam:   Constitutional: no acute distress  HEENT: NCAT, EOMI, no scleral icterus  Cardiovascular: Regular rate and rhythm  Pulmonary: Normal respiratory effort   Abdomen: nontender, non-distended   Neuro: alert and oriented, no focal deficits  Extremities: warm, no edema   MSK: no deformities  Integument: intact, no rashes       Data       No results for input(s): "WBC", "HGB", "HCT", "PLT" in the last 168 hours.     No results for input(s): "PROTIME", "INR" in the last 168 hours.     No results for input(s): "NA", "K", "CL", "CO2", "BUN", "CREATININE", "ANIONGAP", "CALCIUM" in the last 168 hours.     No results for input(s): " ""PROT", "ALBUMIN", "BILITOT", "ALKPHOS", "AST", "ALT" in the last 168 hours.     No results for input(s): "TROPONINI" in the last 168 hours.     BNP (pg/mL)   Date Value   11/18/2024 1,194 (H)   11/10/2024 740 (H)   09/29/2024 458 (H)   09/24/2024 425 (H)   03/14/2024 247 (H)       No results for input(s): "LABBLOO" in the last 168 hours.         Assessment & Plan     Bioprosthetic valve stenosis       -No absolute contraindications of esophageal stricture, tumor, perforation, laceration,or diverticulum and/or active GI bleed  -The risks, benefits & alternatives of the procedure were explained to the patient.   -The risks of transesophageal echo include but are not limited to:  Dental trauma, esophageal trauma/perforation, bleeding, laryngospasm/brochospasm, aspiration, sore throat/hoarseness, & dislodgement of the endotracheal tube/nasogastric tube (where applicable).    -The risks of moderate sedation include hypotension, respiratory depression, arrhythmias, bronchospasm, & death.    -Informed consent was obtained. The patient is agreeable to proceed with the procedure and all questions and concerns addressed    Shikha Raymundo MD  [facility]   Cardiovascular Disease PGY-V    "

## 2024-12-11 NOTE — PLAN OF CARE
Patient admitted to SSCU. Initial assessment completed by this RN. VS obtained. 22 g IV inserted to L A/C. Plan of care reviewed with patient. Patient verbalizes understanding and agrees with plan of care. NITA

## 2024-12-12 LAB — POCT GLUCOSE: 123 MG/DL (ref 70–110)

## 2024-12-30 ENCOUNTER — CLINICAL SUPPORT (OUTPATIENT)
Dept: CARDIOLOGY | Facility: HOSPITAL | Age: 79
End: 2024-12-30
Payer: MEDICARE

## 2024-12-30 ENCOUNTER — CLINICAL SUPPORT (OUTPATIENT)
Dept: CARDIOLOGY | Facility: HOSPITAL | Age: 79
End: 2024-12-30
Attending: INTERNAL MEDICINE
Payer: MEDICARE

## 2024-12-30 DIAGNOSIS — I44.2 ATRIOVENTRICULAR BLOCK, COMPLETE: ICD-10-CM

## 2024-12-30 DIAGNOSIS — Z95.0 PRESENCE OF CARDIAC PACEMAKER: ICD-10-CM

## 2024-12-30 PROCEDURE — 93296 REM INTERROG EVL PM/IDS: CPT | Performed by: INTERNAL MEDICINE

## 2024-12-30 PROCEDURE — 93294 REM INTERROG EVL PM/LDLS PM: CPT | Mod: ,,, | Performed by: INTERNAL MEDICINE

## 2025-01-01 ENCOUNTER — PATIENT MESSAGE (OUTPATIENT)
Dept: CARDIOLOGY | Facility: CLINIC | Age: 80
End: 2025-01-01
Payer: MEDICARE

## 2025-01-01 ENCOUNTER — PATIENT MESSAGE (OUTPATIENT)
Facility: CLINIC | Age: 80
End: 2025-01-01
Payer: MEDICARE

## 2025-01-01 ENCOUNTER — PATIENT MESSAGE (OUTPATIENT)
Dept: ELECTROPHYSIOLOGY | Facility: CLINIC | Age: 80
End: 2025-01-01
Payer: MEDICARE

## 2025-01-09 ENCOUNTER — HOSPITAL ENCOUNTER (OUTPATIENT)
Dept: RADIOLOGY | Facility: HOSPITAL | Age: 80
Discharge: HOME | End: 2025-01-09
Attending: FAMILY MEDICINE
Payer: MEDICARE

## 2025-01-09 DIAGNOSIS — R05.1 ACUTE COUGH: ICD-10-CM

## 2025-01-09 PROCEDURE — 71046 X-RAY EXAM CHEST 2 VIEWS: CPT | Mod: 26,,, | Performed by: RADIOLOGY

## 2025-01-09 PROCEDURE — 71046 X-RAY EXAM CHEST 2 VIEWS: CPT | Mod: TC,FY,PN

## 2025-01-13 ENCOUNTER — LAB VISIT (OUTPATIENT)
Dept: LAB | Facility: HOSPITAL | Age: 80
End: 2025-01-13
Attending: FAMILY MEDICINE
Payer: MEDICARE

## 2025-01-13 DIAGNOSIS — E87.1 HYPOSMOLALITY SYNDROME: Primary | ICD-10-CM

## 2025-01-13 LAB
ANION GAP SERPL CALC-SCNC: 7 MMOL/L (ref 8–16)
CALCIUM SERPL-MCNC: 9.5 MG/DL (ref 8.7–10.5)
CHLORIDE SERPL-SCNC: 84 MMOL/L (ref 95–110)
CO2 SERPL-SCNC: 38 MMOL/L (ref 23–29)
CREAT SERPL-MCNC: 0.88 MG/DL (ref 0.5–1.4)
EST. GFR  (NO RACE VARIABLE): >60 ML/MIN/1.73 M^2
GLUCOSE SERPL-MCNC: 102 MG/DL (ref 70–110)
POTASSIUM SERPL-SCNC: 4.6 MMOL/L (ref 3.5–5.1)
SODIUM SERPL-SCNC: 129 MMOL/L (ref 136–145)
UUN UR-MCNC: 31 MG/DL (ref 7–17)

## 2025-01-13 PROCEDURE — 80048 BASIC METABOLIC PNL TOTAL CA: CPT | Mod: PN | Performed by: FAMILY MEDICINE

## 2025-01-13 PROCEDURE — 36415 COLL VENOUS BLD VENIPUNCTURE: CPT | Mod: PN | Performed by: FAMILY MEDICINE

## 2025-01-14 ENCOUNTER — TELEPHONE (OUTPATIENT)
Facility: CLINIC | Age: 80
End: 2025-01-14
Payer: MEDICARE

## 2025-01-28 ENCOUNTER — TELEPHONE (OUTPATIENT)
Dept: PULMONOLOGY | Facility: CLINIC | Age: 80
End: 2025-01-28
Payer: MEDICARE

## 2025-01-28 ENCOUNTER — PATIENT MESSAGE (OUTPATIENT)
Dept: PULMONOLOGY | Facility: CLINIC | Age: 80
End: 2025-01-28
Payer: MEDICARE

## 2025-01-28 ENCOUNTER — PATIENT MESSAGE (OUTPATIENT)
Facility: CLINIC | Age: 80
End: 2025-01-28
Payer: MEDICARE

## 2025-01-28 NOTE — TELEPHONE ENCOUNTER
----- Message from Komal sent at 1/28/2025 11:18 AM CST -----  Consult/Advisory    Name Of Caller:Mr Strong       Contact Preference:[phone]    Nature of call: Pt  called  he is asking questions regarding the appt

## 2025-01-29 ENCOUNTER — TELEPHONE (OUTPATIENT)
Dept: PULMONOLOGY | Facility: CLINIC | Age: 80
End: 2025-01-29
Payer: MEDICARE

## 2025-01-29 NOTE — TELEPHONE ENCOUNTER
Patient was scheduled for 01/30/2025  , patient has been moved   to 03/27/2025 at 10:00 due to the provider being out/pt confirmed

## 2025-01-30 ENCOUNTER — EXTERNAL HOME HEALTH (OUTPATIENT)
Dept: HOME HEALTH SERVICES | Facility: HOSPITAL | Age: 80
End: 2025-01-30
Payer: MEDICARE

## 2025-02-02 NOTE — PROGRESS NOTES
/Subjective:   Patient ID:  Adriana Strong is a 80 y.o. female who presents for follow up management of No chief complaint on file.    HPI:   2/2025: F/U. Readmitted to [facility] in 11/2024 with recurrent chf. .  Significant changes noted in PEDRO 11/2024 as compared to 9/2024. Underwent PEDRO due to concern about bioprosthesis degeneration.   She was referred to structural team for Hernan evaluation.  She is doing okay.  On 2 L nasal cannula.  Taking Lasix 40 mg b.i.d. reports low-salt rate      October 14, 2024:  Initial visit with me.  Former patient of Dr. Mariee.  Seen by heart failure team as well as EP team.  Recent admission for CHF.  Currently taking Lasix 40 mg b.i.d. and extra p.r.n. she has COPD on home oxygen 2 L.  Accompanied by her  and her daughter.  Very pleasant family.  Since discharge has been okay.  Monitor her weight daily.  Weight has been stable since discharge around 200 lb.    Historically:     COPD on home oxygen 2 L, CAD, HLD, HTN. AS s/p TAVR, DM, CVA, CHF, AF and PPM with upgrade to CRT P in May 2024, history of inferior mesenteric revascularization with a 5 mm bare metal stent.  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            Left heart catheterization February 2024 for abnormal PET stress test     The estimated blood loss was none.    There was non-obstructive coronary artery disease..    False positive PET scan.       [facility] 2019 prior to TAVR:  1.  The LVH made coronary angiography very difficult.  A 10 cc was required for coronary angiograms.  2.  The left main was free of disease.  It was very short making cannulation of the LAD difficulty.  3.  The LAD had a long tubular stenosis of 70% at the diagonal bifurcation.  The diagonal was a larger vessel than the LAD making intervention on the LAD suboptimal.  4.  The left circumflex was normal.  5.  The right coronary artery was normal.        Venous US 9/2022    There is no evidence of  a right lower extremity DVT.  The right superficial femoral middle vein is normal.  There is no evidence of a left lower extremity DVT.  The right greater saphenous vein has reflux.  The left greater saphenous vein has reflux.    PET stress 9/2022     Non ischemic with a normal EF    PEDRO 12/11/2024      PEDRO to evaluate for prosthetic aortic valve stenosis. There is degeneration of the prosthetic valve leaflets that appears severely restricted likely secondary to pannus formation. No thrombus identified. Additionally, LV global longitudinal strain is suspicious for infiltrative cardiomyopathy. Consider clinical correlation for further evaluation.    Left Ventricle: Increased wall thickness. There is normal systolic function with a visually estimated ejection fraction of 55 - 60%.    Right Ventricle: There is hypertrophy. Systolic function is normal. Pacemaker lead present in the ventricle.    Left Atrium: The left atrial appendage has a windsock morphology. Appendage velocity is reduced at less than 40 cm/sec. There is no definitive thrombus in the left atrial appendage, however there is some sludge present.    Bi-atrial enlargement.    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 It is reported to be a 26 mm Phan valve. Severely restricted motion. There is mild aortic regurgitation with a centrally directed jet.    Mitral Valve: There is mild regurgitation with a centrally directed jet.    Tricuspid Valve: There is mild to moderate regurgitation with a centrally directed jet.    Aorta: Grade 2 atherosclerosis of the ascending aorta.    Pulmonary Artery. The estimated pulmonary artery systolic pressure is at least 36 mmHg.      Echo September 2024      Left Ventricle: The left ventricle is normal in size. Normal wall thickness. There is concentric remodeling. There is normal systolic function with a visually estimated ejection fraction of 60 - 65%. Grade III diastolic  dysfunction. Elevated left ventricular filling pressure.    Right Ventricle: Normal right ventricular cavity size. Wall thickness is normal. Systolic function is normal. Pacemaker lead present in the ventricle.    The left atrium is severely dilated.    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 It is reported to be a 26mm Phan Suzi S3 valve. Aortic valve area by VTI is 1.90 cm2. Aortic valve peak velocity is 1.64 m/s. Mean gradient is 6 mmHg. The dimensionless index is 0.57. There is mild aortic regurgitation.    Tricuspid Valve: There is mild regurgitation.    Pulmonary Artery: The estimated pulmonary artery systolic pressure is 48 mmHg.    IVC/SVC: Elevated venous pressure at 15 mmHg.    Pericardium: There is a small posterior effusion. No indication of cardiac tamponade.  Echo 10/2022     Normal EF   Normal TAVR   PASP 47 mmHg        LIZ PTAS with BMS 2015 (5 mm stent) - Dr. Joana Hernandez         Patient Active Problem List    Diagnosis Date Noted    Irritant contact dermatitis due to friction or contact with body fluids, unspecified 11/15/2024    Anxiety 11/14/2024    Pleural effusion 11/12/2024    Hyperkalemia 11/10/2024    Hypomagnesemia 11/10/2024    Anemia 10/03/2024    Acute on chronic respiratory failure with hypoxia 09/24/2024    Severe obesity with body mass index (BMI) of 35.0 to 39.9 with comorbidity 02/21/2024    Chest discomfort 09/22/2023    Pacemaker 07/03/2023    Occlusion of superior mesenteric artery 04/05/2023    Acute on chronic diastolic congestive heart failure 09/30/2022    Aortic atherosclerosis 06/25/2021    History of transcatheter aortic valve replacement (TAVR) 12/08/2020    Coronary artery disease involving native coronary artery of native heart without angina pectoris 12/08/2020    Chronic heart failure with preserved ejection fraction 11/12/2020    Localized edema 11/12/2020    CAD (coronary artery disease) 10/30/2020    Asthma in adult  10/30/2020    Acute cystitis without hematuria     Dysuria 10/19/2020    Hyponatremia 09/01/2020    Atrial flutter 05/15/2020    Vasovagal syncope 11/26/2019    TACOS (acute kidney injury) 11/26/2019    Obesity 11/20/2019    S/P TAVR (transcatheter aortic valve replacement) 11/19/2019    Lumbar radiculopathy 06/25/2019    Decreased range of motion of trunk and back 06/10/2019    Lumbar spondylosis 06/10/2019    Neuroforaminal stenosis of lumbar spine 06/10/2019    Spinal stenosis of lumbar region with neurogenic claudication 06/10/2019    Balance problems 06/06/2019    DDD (degenerative disc disease), lumbar 05/14/2019    Cataract 05/14/2019    Diabetic polyneuropathy associated with type 2 diabetes mellitus 02/19/2019    Costochondritis 06/23/2017    COPD (chronic obstructive pulmonary disease) 06/13/2017    UTI (urinary tract infection) 05/29/2017    Incisional hernia, without obstruction or gangrene 05/09/2017    Aortic stenosis 02/03/2017    COPD exacerbation 01/02/2017    Old lacunar stroke without late effect 12/09/2016     On Eliquis and plavix      Permanent atrial fibrillation 07/08/2016    Pure hypercholesterolemia 06/17/2016    Essential hypertension 04/08/2016    Gastroesophageal reflux disease     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    Chronic mesenteric ischemia 04/16/2015    Enlarged ovary 11/24/2014    Type 2 diabetes mellitus 11/24/2014     A1C 7.0      Severe persistent asthma with acute exacerbation 11/24/2014    HLD (hyperlipidemia) 11/24/2014    GERD (gastroesophageal reflux disease) 11/24/2014                    LABS      LAST HbA1c  Lab Results   Component Value Date    HGBA1C 5.2 11/10/2024       Lipid panel  Lab Results   Component Value Date    CHOL 149 03/22/2023    CHOL 168 08/11/2022    CHOL 143 03/16/2021     Lab Results   Component Value Date    HDL 63 03/22/2023    HDL 69 08/11/2022    HDL 56 03/16/2021     Lab Results   Component Value Date    LDLCALC 62.4 (L) 03/22/2023     LDLCALC 87.0 08/11/2022    LDLCALC 69.4 03/16/2021     Lab Results   Component Value Date    TRIG 118 03/22/2023    TRIG 60 08/11/2022    TRIG 88 03/16/2021     Lab Results   Component Value Date    CHOLHDL 42.3 03/22/2023    CHOLHDL 41.1 08/11/2022    CHOLHDL 39.2 03/16/2021            Review of Systems   Constitutional: Positive for malaise/fatigue. Negative for chills and fever.   HENT:  Negative for hearing loss and nosebleeds.    Eyes:  Negative for blurred vision.   Cardiovascular:  Positive for dyspnea on exertion.        As in HPI   Respiratory:  Positive for shortness of breath. Negative for hemoptysis.    Hematologic/Lymphatic: Negative for bleeding problem. Bruises/bleeds easily.   Skin:  Negative for itching.   Musculoskeletal:         Generalized weakness   Gastrointestinal:  Negative for abdominal pain and hematochezia.   Genitourinary:  Negative for hematuria.   Neurological:         As in HPI   Psychiatric/Behavioral:  Negative for altered mental status and depression.        Objective:   Physical Exam  Constitutional:       Comments: Frail   HENT:      Head: Normocephalic and atraumatic.   Cardiovascular:      Rate and Rhythm: Normal rate.      Heart sounds: Murmur (grade 2 systolic) heard.   Pulmonary:      Breath sounds: Rales present.      Comments: Diminished air entry bilaterally.  Basilar crackles  Abdominal:      General: Abdomen is flat.      Palpations: Abdomen is soft.   Musculoskeletal:      Right lower leg: Edema (trace) present.      Left lower leg: Edema (trace) present.   Skin:     Findings: Bruising present.   Neurological:      Mental Status: She is oriented to person, place, and time.   Psychiatric:         Behavior: Behavior normal.         Assessment:     1. Aortic atherosclerosis    2. Severe obesity with body mass index (BMI) of 35.0 to 39.9 with comorbidity    3. Chronic heart failure with preserved ejection fraction    4. Paroxysmal atrial fibrillation    5. Acute on chronic  diastolic congestive heart failure    6. Coronary artery disease involving native coronary artery of native heart with angina pectoris with documented spasm    7. Coronary artery disease involving native coronary artery of native heart without angina pectoris    8. Essential hypertension    9. History of transcatheter aortic valve replacement (TAVR)    10. Hyperlipidemia, unspecified hyperlipidemia type    11. S/P TAVR (transcatheter aortic valve replacement)    12. Permanent atrial fibrillation              Plan:   1. Aortic stenosis   Post TAVR in 2019.  Recent PEDRO and TTE suggest bioprosthesis stenosis.  Pending appointment with the structural team for Hernan evaluation    2. Chronic diastolic heart failure.  Continue Lasix 40 mg p.o. b.i.d. and extra p.r.n..  We discussed about p.r.n. indications with 2 lb weight gain in 24 hours or 5 lb weight gain in 1 week.  We also discussed about possibilities of CardioMEMS in the future if recurrent hospitalization with CHF  Low-salt diet.  We discussed thoroughly about reading labels.  2 g sodium daily  Fluid restriction to 2 L daily  Continue current BP regimen.  Monitor blood pressure and keep log.  Goal BP less than 130/80    3. Stable nonobstructive coronary artery disease.  Continue statin, Plavix, Ranexa, Imdur.        4. Permanent Afib.  Follow up with EP.  Continue Eliquis, and Coreg.     5. Post CRT-P follow up with EP    Weight loss    3 month follow-up or sooner p.r.n.    Continue atorvastatin.  LDL at goal lower than 70    Continue with current medical plan and lifestyle changes.  Return sooner for concerns or questions. If symptoms persist go to the ED  I have reviewed all pertinent data on this patient       I have reviewed the patient's medical history in detail and updated the computerized patient record.  No orders of the defined types were placed in this encounter.    Follow up as scheduled. Return sooner for concerns or questions            She expressed  verbal understanding and agreed with the plan    Visit today included increased complexity associated with the care of the episodic problem aortic stenosis, bioprosthetic aortic valve dysfunction, CRT, heart failure, coronary artery disease addressed and managing the longitudinal care of the patient due to the serious and/or complex managed problems       -In today's visit, at least 4 established conditions that pose a risk to life or bodily function have been addressed and the conditions are severe.    -In today's visit, monitoring for drug toxicity was accomplished.      Patient's Medications   New Prescriptions    No medications on file   Previous Medications    ALBUTEROL-IPRATROPIUM (DUO-NEB) 2.5 MG-0.5 MG/3 ML NEBULIZER SOLUTION    Take 3 mLs by nebulization every 4 (four) hours as needed for Wheezing or Shortness of Breath.    APIXABAN (ELIQUIS) 5 MG TAB    Take 1 tablet (5 mg total) by mouth 2 (two) times daily.    ASCORBIC ACID, VITAMIN C, (VITAMIN C) 500 MG TABLET    Take 1,000 mg by mouth once daily.     ATORVASTATIN (LIPITOR) 10 MG TABLET    Take 1 tablet (10 mg total) by mouth once daily.    CALCIUM CARBONATE/VITAMIN D3 (CALTRATE 600 + D ORAL)    Take 1 tablet by mouth once daily.    CARVEDILOL (COREG) 12.5 MG TABLET    Take 1 tablet (12.5 mg total) by mouth 2 (two) times daily with meals.    CLOPIDOGREL (PLAVIX) 75 MG TABLET    Take 1 tablet (75 mg total) by mouth once daily.    DULOXETINE (CYMBALTA) 20 MG CAPSULE    Take 1 capsule (20 mg total) by mouth once daily.    ESTRADIOL (ESTRACE) 0.01 % (0.1 MG/GRAM) VAGINAL CREAM    Place vaginally.    FEXOFENADINE (ALLEGRA) 60 MG TABLET    Take 60 mg by mouth once daily.    FUROSEMIDE (LASIX) 40 MG TABLET    Take 1 tablet (40 mg total) by mouth 2 (two) times a day.    GABAPENTIN (NEURONTIN) 300 MG CAPSULE    Take 1 capsule (300 mg total) by mouth every evening.    ISOSORBIDE MONONITRATE (IMDUR) 60 MG 24 HR TABLET    Take 1 tablet (60 mg total) by mouth once  daily.    LOSARTAN (COZAAR) 50 MG TABLET    Take 1 tablet (50 mg total) by mouth once daily.    METFORMIN (GLUCOPHAGE) 500 MG TABLET    Take 1 tablet (500 mg total) by mouth 2 (two) times daily.    MONTELUKAST (SINGULAIR) 10 MG TABLET    Take 10 mg by mouth once daily.    PANTOPRAZOLE (PROTONIX) 40 MG TABLET    Take 1 tablet (40 mg total) by mouth once daily.    RANOLAZINE (RANEXA) 1,000 MG TB12    TAKE 1 TABLET BY MOUTH TWICE A DAY    SENNA (SENOKOT) 8.6 MG TABLET    Take 1 tablet by mouth 2 (two) times a day.    SPIRONOLACTONE (ALDACTONE) 25 MG TABLET    Take 1 tablet (25 mg total) by mouth once daily.    SYMBICORT 160-4.5 MCG/ACTUATION HFAA    Inhale 2 puffs into the lungs 2 (two) times daily.   Modified Medications    No medications on file   Discontinued Medications    No medications on file

## 2025-02-03 ENCOUNTER — OFFICE VISIT (OUTPATIENT)
Dept: CARDIOLOGY | Facility: CLINIC | Age: 80
End: 2025-02-03
Payer: MEDICARE

## 2025-02-03 VITALS
WEIGHT: 202.38 LBS | DIASTOLIC BLOOD PRESSURE: 61 MMHG | HEART RATE: 59 BPM | OXYGEN SATURATION: 95 % | SYSTOLIC BLOOD PRESSURE: 125 MMHG | BODY MASS INDEX: 34.55 KG/M2 | HEIGHT: 64 IN

## 2025-02-03 DIAGNOSIS — I70.0 AORTIC ATHEROSCLEROSIS: Primary | ICD-10-CM

## 2025-02-03 DIAGNOSIS — E78.5 HYPERLIPIDEMIA, UNSPECIFIED HYPERLIPIDEMIA TYPE: ICD-10-CM

## 2025-02-03 DIAGNOSIS — I10 ESSENTIAL HYPERTENSION: ICD-10-CM

## 2025-02-03 DIAGNOSIS — I50.32 CHRONIC HEART FAILURE WITH PRESERVED EJECTION FRACTION: ICD-10-CM

## 2025-02-03 DIAGNOSIS — I48.21 PERMANENT ATRIAL FIBRILLATION: ICD-10-CM

## 2025-02-03 DIAGNOSIS — Z95.2 HISTORY OF TRANSCATHETER AORTIC VALVE REPLACEMENT (TAVR): ICD-10-CM

## 2025-02-03 DIAGNOSIS — I48.0 PAROXYSMAL ATRIAL FIBRILLATION: ICD-10-CM

## 2025-02-03 DIAGNOSIS — I25.111 CORONARY ARTERY DISEASE INVOLVING NATIVE CORONARY ARTERY OF NATIVE HEART WITH ANGINA PECTORIS WITH DOCUMENTED SPASM: ICD-10-CM

## 2025-02-03 DIAGNOSIS — E66.01 SEVERE OBESITY WITH BODY MASS INDEX (BMI) OF 35.0 TO 39.9 WITH COMORBIDITY: ICD-10-CM

## 2025-02-03 DIAGNOSIS — Z95.3 S/P TAVR (TRANSCATHETER AORTIC VALVE REPLACEMENT): ICD-10-CM

## 2025-02-03 DIAGNOSIS — I50.33 ACUTE ON CHRONIC DIASTOLIC CONGESTIVE HEART FAILURE: ICD-10-CM

## 2025-02-03 DIAGNOSIS — I25.10 CORONARY ARTERY DISEASE INVOLVING NATIVE CORONARY ARTERY OF NATIVE HEART WITHOUT ANGINA PECTORIS: ICD-10-CM

## 2025-02-03 PROCEDURE — 3074F SYST BP LT 130 MM HG: CPT | Mod: CPTII,S$GLB,, | Performed by: INTERNAL MEDICINE

## 2025-02-03 PROCEDURE — 99214 OFFICE O/P EST MOD 30 MIN: CPT | Mod: S$GLB,,, | Performed by: INTERNAL MEDICINE

## 2025-02-03 PROCEDURE — 3288F FALL RISK ASSESSMENT DOCD: CPT | Mod: CPTII,S$GLB,, | Performed by: INTERNAL MEDICINE

## 2025-02-03 PROCEDURE — 99999 PR PBB SHADOW E&M-EST. PATIENT-LVL III: CPT | Mod: PBBFAC,,, | Performed by: INTERNAL MEDICINE

## 2025-02-03 PROCEDURE — 3078F DIAST BP <80 MM HG: CPT | Mod: CPTII,S$GLB,, | Performed by: INTERNAL MEDICINE

## 2025-02-03 PROCEDURE — 1101F PT FALLS ASSESS-DOCD LE1/YR: CPT | Mod: CPTII,S$GLB,, | Performed by: INTERNAL MEDICINE

## 2025-02-03 PROCEDURE — 1126F AMNT PAIN NOTED NONE PRSNT: CPT | Mod: CPTII,S$GLB,, | Performed by: INTERNAL MEDICINE

## 2025-02-03 PROCEDURE — 1157F ADVNC CARE PLAN IN RCRD: CPT | Mod: CPTII,S$GLB,, | Performed by: INTERNAL MEDICINE

## 2025-02-03 PROCEDURE — G2211 COMPLEX E/M VISIT ADD ON: HCPCS | Mod: S$GLB,,, | Performed by: INTERNAL MEDICINE

## 2025-02-04 ENCOUNTER — OFFICE VISIT (OUTPATIENT)
Dept: CARDIOLOGY | Facility: CLINIC | Age: 80
End: 2025-02-04
Payer: MEDICARE

## 2025-02-04 VITALS
BODY MASS INDEX: 31.58 KG/M2 | HEIGHT: 64 IN | HEART RATE: 59 BPM | SYSTOLIC BLOOD PRESSURE: 156 MMHG | OXYGEN SATURATION: 97 % | WEIGHT: 185 LBS | DIASTOLIC BLOOD PRESSURE: 69 MMHG

## 2025-02-04 DIAGNOSIS — Z95.3 S/P TAVR (TRANSCATHETER AORTIC VALVE REPLACEMENT): ICD-10-CM

## 2025-02-04 DIAGNOSIS — I50.33 ACUTE ON CHRONIC DIASTOLIC CONGESTIVE HEART FAILURE: ICD-10-CM

## 2025-02-04 DIAGNOSIS — I10 ESSENTIAL HYPERTENSION: ICD-10-CM

## 2025-02-04 DIAGNOSIS — J96.21 ACUTE ON CHRONIC RESPIRATORY FAILURE WITH HYPOXIA: Primary | ICD-10-CM

## 2025-02-04 DIAGNOSIS — E11.69 TYPE 2 DIABETES MELLITUS WITH OTHER SPECIFIED COMPLICATION, UNSPECIFIED WHETHER LONG TERM INSULIN USE: ICD-10-CM

## 2025-02-04 DIAGNOSIS — T82.857A STENOSIS OF PROSTHETIC AORTIC VALVE, INITIAL ENCOUNTER: ICD-10-CM

## 2025-02-04 DIAGNOSIS — I70.0 AORTIC ATHEROSCLEROSIS: ICD-10-CM

## 2025-02-04 DIAGNOSIS — I35.0 SEVERE AORTIC STENOSIS: ICD-10-CM

## 2025-02-04 DIAGNOSIS — I48.21 PERMANENT ATRIAL FIBRILLATION: ICD-10-CM

## 2025-02-04 DIAGNOSIS — E66.9 OBESITY, UNSPECIFIED CLASS, UNSPECIFIED OBESITY TYPE, UNSPECIFIED WHETHER SERIOUS COMORBIDITY PRESENT: ICD-10-CM

## 2025-02-04 DIAGNOSIS — E78.5 HYPERLIPIDEMIA, UNSPECIFIED HYPERLIPIDEMIA TYPE: ICD-10-CM

## 2025-02-04 PROCEDURE — 3078F DIAST BP <80 MM HG: CPT | Mod: CPTII,S$GLB,, | Performed by: INTERNAL MEDICINE

## 2025-02-04 PROCEDURE — 3077F SYST BP >= 140 MM HG: CPT | Mod: CPTII,S$GLB,, | Performed by: INTERNAL MEDICINE

## 2025-02-04 PROCEDURE — 99999 PR PBB SHADOW E&M-EST. PATIENT-LVL V: CPT | Mod: PBBFAC,,, | Performed by: INTERNAL MEDICINE

## 2025-02-04 PROCEDURE — 1126F AMNT PAIN NOTED NONE PRSNT: CPT | Mod: CPTII,S$GLB,, | Performed by: INTERNAL MEDICINE

## 2025-02-04 PROCEDURE — 1160F RVW MEDS BY RX/DR IN RCRD: CPT | Mod: CPTII,S$GLB,, | Performed by: INTERNAL MEDICINE

## 2025-02-04 PROCEDURE — 1100F PTFALLS ASSESS-DOCD GE2>/YR: CPT | Mod: CPTII,S$GLB,, | Performed by: INTERNAL MEDICINE

## 2025-02-04 PROCEDURE — 3288F FALL RISK ASSESSMENT DOCD: CPT | Mod: CPTII,S$GLB,, | Performed by: INTERNAL MEDICINE

## 2025-02-04 PROCEDURE — 99214 OFFICE O/P EST MOD 30 MIN: CPT | Mod: S$GLB,,, | Performed by: INTERNAL MEDICINE

## 2025-02-04 PROCEDURE — 1157F ADVNC CARE PLAN IN RCRD: CPT | Mod: CPTII,S$GLB,, | Performed by: INTERNAL MEDICINE

## 2025-02-04 PROCEDURE — 1159F MED LIST DOCD IN RCRD: CPT | Mod: CPTII,S$GLB,, | Performed by: INTERNAL MEDICINE

## 2025-02-04 NOTE — ASSESSMENT & PLAN NOTE
-severe bioprosthetic AV dysfunction, NYHA III symptoms  -unfortunately she is too weak at this time, and needs 'prehab'  -referral to PT and RTC 2-3 months to assess

## 2025-02-04 NOTE — PROGRESS NOTES
"Referring provider: Dr. Shikha Raymundo     Patient ID:  Adriana Strong is a 80 y.o. y.o. female who presents for evaluation Dizziness and Shortness of Breath    She has HTN, HLD, DM, HFpEF and AF and underwent TAVR in 2019 with a 26 -1.5cc S3 valve 11/19/19 with Dr. Capone, with resultant ppm. States she had a PPM upgrade to a CRT device 05/29/24 and states she initially felt better, however, over the past 6 months she has had progressive WHITLEY. She underwent multiple testing and ultimately had a PEDRO that revealed significant valvular obstruction with pannus formation and was then referred to this clinic for evaluation of AS. Of note, she and family state she has gotten progressively weaker and now has issues with balance in addition to SOB/WHITLEY with minimal exertion. She now uses a walker at home. Of note, her and daughter do believe she is slowly getting stronger, as she had HH after hospitalization and is continuing to work on her home exercises. No other acute events.     Review of Systems   All other systems reviewed and are negative.     Objective:     BP (!) 156/69 (BP Location: Right arm, Patient Position: Sitting)   Pulse (!) 59   Ht 5' 4" (1.626 m)   Wt 83.9 kg (185 lb)   LMP 01/21/1973 (LMP Unknown)   SpO2 97%   BMI 31.76 kg/m²     Physical Exam  Vitals and nursing note reviewed.   Constitutional:       Appearance: Normal appearance.   HENT:      Head: Normocephalic and atraumatic.      Right Ear: External ear normal.      Left Ear: External ear normal.      Nose: Nose normal.      Mouth/Throat:      Mouth: Mucous membranes are moist.   Eyes:      Extraocular Movements: Extraocular movements intact.      Pupils: Pupils are equal, round, and reactive to light.   Cardiovascular:      Rate and Rhythm: Normal rate and regular rhythm.      Pulses: Normal pulses.      Heart sounds: Murmur heard.   Pulmonary:      Effort: Pulmonary effort is normal.   Abdominal:      General: Abdomen is flat.   Musculoskeletal: "         General: Normal range of motion.      Cervical back: Normal range of motion.   Skin:     General: Skin is warm and dry.      Capillary Refill: Capillary refill takes less than 2 seconds.   Neurological:      General: No focal deficit present.      Mental Status: She is alert and oriented to person, place, and time. Mental status is at baseline.     Labs:     Lab Results   Component Value Date     (L) 01/13/2025    K 4.6 01/13/2025    CL 84 (L) 01/13/2025    CO2 38 (H) 01/13/2025    BUN 31 (H) 01/13/2025    CREATININE 0.88 01/13/2025    ANIONGAP 7 (L) 01/13/2025     Lab Results   Component Value Date    HGBA1C 5.2 11/10/2024     Lab Results   Component Value Date    BNP 1,194 (H) 11/18/2024     (H) 11/10/2024     (H) 09/29/2024       Lab Results   Component Value Date    WBC 7.63 11/23/2024    HGB 9.0 (L) 11/23/2024    HCT 28.5 (L) 11/23/2024    HCT 31 (L) 11/11/2024     11/23/2024    GRAN 5.7 11/23/2024    GRAN 74.6 (H) 11/23/2024     Lab Results   Component Value Date    CHOL 149 03/22/2023    HDL 63 03/22/2023    LDLCALC 62.4 (L) 03/22/2023    TRIG 118 03/22/2023       Meds:     Current Outpatient Medications:     albuterol-ipratropium (DUO-NEB) 2.5 mg-0.5 mg/3 mL nebulizer solution, Take 3 mLs by nebulization every 4 (four) hours as needed for Wheezing or Shortness of Breath., Disp: , Rfl:     apixaban (ELIQUIS) 5 mg Tab, Take 1 tablet (5 mg total) by mouth 2 (two) times daily., Disp: 180 tablet, Rfl: 3    ascorbic acid, vitamin C, (VITAMIN C) 500 MG tablet, Take 1,000 mg by mouth once daily. , Disp: , Rfl:     atorvastatin (LIPITOR) 10 MG tablet, Take 1 tablet (10 mg total) by mouth once daily., Disp: 30 tablet, Rfl: 0    calcium carbonate/vitamin D3 (CALTRATE 600 + D ORAL), Take 1 tablet by mouth once daily., Disp: , Rfl:     carvediloL (COREG) 12.5 MG tablet, Take 1 tablet (12.5 mg total) by mouth 2 (two) times daily with meals., Disp: 180 tablet, Rfl: 3    clopidogreL  (PLAVIX) 75 mg tablet, Take 1 tablet (75 mg total) by mouth once daily., Disp: 90 tablet, Rfl: 3    DULoxetine (CYMBALTA) 20 MG capsule, Take 1 capsule (20 mg total) by mouth once daily., Disp: 30 capsule, Rfl: 0    estradioL (ESTRACE) 0.01 % (0.1 mg/gram) vaginal cream, Place vaginally., Disp: , Rfl:     fexofenadine (ALLEGRA) 60 MG tablet, Take 60 mg by mouth once daily., Disp: , Rfl:     furosemide (LASIX) 40 MG tablet, Take 1 tablet (40 mg total) by mouth 2 (two) times a day., Disp: 180 tablet, Rfl: 3    gabapentin (NEURONTIN) 300 MG capsule, Take 1 capsule (300 mg total) by mouth every evening., Disp: 30 capsule, Rfl: 11    isosorbide mononitrate (IMDUR) 60 MG 24 hr tablet, Take 1 tablet (60 mg total) by mouth once daily., Disp: 30 tablet, Rfl: 0    losartan (COZAAR) 50 MG tablet, Take 1 tablet (50 mg total) by mouth once daily., Disp: 90 tablet, Rfl: 3    metFORMIN (GLUCOPHAGE) 500 MG tablet, Take 1 tablet (500 mg total) by mouth 2 (two) times daily., Disp: 60 tablet, Rfl: 0    montelukast (SINGULAIR) 10 mg tablet, Take 10 mg by mouth once daily., Disp: , Rfl:     pantoprazole (PROTONIX) 40 MG tablet, Take 1 tablet (40 mg total) by mouth once daily., Disp: 90 tablet, Rfl: 3    ranolazine (RANEXA) 1,000 mg Tb12, TAKE 1 TABLET BY MOUTH TWICE A DAY, Disp: 180 tablet, Rfl: 3    senna (SENOKOT) 8.6 mg tablet, Take 1 tablet by mouth 2 (two) times a day., Disp: 60 tablet, Rfl: 11    spironolactone (ALDACTONE) 25 MG tablet, Take 1 tablet (25 mg total) by mouth once daily., Disp: 30 tablet, Rfl: 11    SYMBICORT 160-4.5 mcg/actuation HFAA, Inhale 2 puffs into the lungs 2 (two) times daily., Disp: , Rfl:   No current facility-administered medications for this visit.    Facility-Administered Medications Ordered in Other Visits:     0.9%  NaCl infusion, , Intravenous, Continuous, Lynette Hightower, NP, New Bag at 05/29/24 1440    sodium chloride 0.9% flush 5 mL, 5 mL, Intravenous, PRN, Lynette Hightower, NP       Assessment & Plan:     Acute on chronic respiratory failure with hypoxia  -on Home O2, continue fu with pcp/pulm     HLD (hyperlipidemia)  -atorvastatin     Essential hypertension  -continue current regimen    Permanent atrial fibrillation  -continue carvedilol and eliquis     Aortic atherosclerosis  -continue statin, bp control     S/P TAVR (transcatheter aortic valve replacement)  -now with severe AS with pannus formation on PEDRO, see severe AS     Acute on chronic diastolic congestive heart failure  -recent hospitalization now closer to euvolemia  -continue bp control and furosemide     Type 2 diabetes mellitus  -blood sugar control per primary     Obesity  -diet and exercise     Severe aortic stenosis  -severe bioprosthetic AV dysfunction, NYHA III symptoms  -unfortunately she is too weak at this time, and needs 'prehab'  -referral to PT and RTC 2-3 months to assess       Oleksandr Saravia MD   Interventional Cardiology       IC STAFF  I have seen the patient, reviewed the Fellow's history and physical, assessment and plan. I have personally interviewed and examined the patient and agree with the findings.     JOY Baptiste MD

## 2025-02-17 ENCOUNTER — OFFICE VISIT (OUTPATIENT)
Facility: CLINIC | Age: 80
End: 2025-02-17
Payer: MEDICARE

## 2025-02-17 VITALS
DIASTOLIC BLOOD PRESSURE: 64 MMHG | SYSTOLIC BLOOD PRESSURE: 111 MMHG | HEIGHT: 64 IN | BODY MASS INDEX: 31.48 KG/M2 | WEIGHT: 184.38 LBS | HEART RATE: 59 BPM

## 2025-02-17 DIAGNOSIS — R26.89 IMBALANCE: Primary | ICD-10-CM

## 2025-02-17 DIAGNOSIS — G25.3 MYOCLONUS: ICD-10-CM

## 2025-02-17 RX ORDER — NITROFURANTOIN (MACROCRYSTALS) 100 MG/1
100 CAPSULE ORAL DAILY
COMMUNITY

## 2025-02-17 RX ORDER — FLUCONAZOLE 150 MG/1
150 TABLET ORAL
COMMUNITY

## 2025-02-17 NOTE — PROGRESS NOTES
MOVEMENT DISORDERS CLINIC NEW CONSULT NOTE    PCP/Referring Provider:   No referring provider defined for this encounter.  Date of Service: 2/17/2025    Chief Complaint: Balance and cognition, tremors     HPI: Adriana Strong is a 80 y.o. female with PMH most notable for CHF, aortic valve replacement, Afib, CKD, DM2, presenting for evaluation.    Tremors about one year. Arms and legs. Leg tremors are described as legs giving out when walking. Fell in August. Arm tremors are mixed rest and action.     Current Medications:  Encounter Medications[1]    Past Medical History:  Problem List[2]    Past Surgical History:  Past Surgical History:   Procedure Laterality Date    ABDOMINAL SURGERY      stents to SMA    angiocele      2007, 2014    AORTIC VALVE REPLACEMENT N/A 11/19/2019    Procedure: Replacement-valve-aortic;  Surgeon: Jack Capone MD;  Location: [facility] CATH LAB;  Service: Cardiology;  Laterality: N/A;    BREAST SURGERY      left--- a lump--- no cancer    CARDIAC VALVE SURGERY      COLONOSCOPY  2014    COLONOSCOPY N/A 3/14/2018    Procedure: COLONOSCOPY;  Surgeon: Efren Kemp MD;  Location: [facility] ENDO ([address] [place] [address]);  Service: Endoscopy;  Laterality: N/A;  ok to hold Plavix 5 days prior to procedure per Dr REHSMA Hernandez     ok per Dr Kemp to hold Eliquis 2 days prior to procedure (see telephone encounter dated-2/7/18)    CORONARY ANGIOGRAPHY N/A 2/15/2024    Procedure: ANGIOGRAM, CORONARY ARTERY;  Surgeon: Jos Baptiste MD;  Location: [facility] CATH LAB;  Service: Cardiology;  Laterality: N/A;    ECHOCARDIOGRAM,TRANSESOPHAGEAL N/A 12/11/2024    Procedure: Transesophageal echo (PEDRO) intra-procedure log documentation;  Surgeon: Provider, [facility];  Location: [facility] EP LAB;  Service: Cardiology;  Laterality: N/A;    HERNIA REPAIR      HYSTERECTOMY  partial    1982 partial hysterectomy    IMPLANTATION OF BIVENTRICULAR PERMANENT PACEMAKER AS UPGRADE TO EXISTING PACEMAKER Left 5/29/2024    Procedure: Biventricular  "pacemaker upgrade;  Surgeon: Chencho Moeller MD;  Location: [facility] EP LAB;  Service: Cardiology;  Laterality: Left;  CHF, AF,  CRTP ugrd, BIO, MAC, MA, 3prep ** BIO dcPPM in situ/ Contrast Prep**    LEFT HEART CATHETERIZATION Right 11/5/2019    Procedure: Left heart cath;  Surgeon: Jack Capone MD;  Location: [facility] CATH LAB;  Service: Cardiology;  Laterality: Right;    left nasal polyp      left neck lymph node      nose polyp      PHLEBOGRAPHY Left 5/1/2024    Procedure: Venogram;  Surgeon: Chencho Moeller MD;  Location: [facility] EP LAB;  Service: Cardiology;  Laterality: Left;  HF, Venogram ( Lt arm) , MA, 3 prep ** BIO dcPPM in situ/ Contrast Prep**    right hip fatty mass tissue      stent to small intestine      SUPERIOR VENA CAVA ANGIOPLASTY / STENTING      TONSILLECTOMY      TOTAL ABDOMINAL HYSTERECTOMY      2014    TOTAL KNEE ARTHROPLASTY Bilateral     TREATMENT OF CARDIAC ARRHYTHMIA N/A 2/10/2020    Procedure: CARDIOVERSION;  Surgeon: Jayy Parrish MD;  Location: [facility] EP LAB;  Service: Cardiology;  Laterality: N/A;  AF, PEDRO, DCCV, MAC, DM, 3 Prep    TREATMENT OF CARDIAC ARRHYTHMIA N/A 5/15/2020    Procedure: CARDIOVERSION;  Surgeon: Hany Bee MD;  Location: [facility] EP LAB;  Service: Cardiology;  Laterality: N/A;  AF, PEDRO, DCCV, MAC, DM, 3 Prep    UPPER GASTROINTESTINAL ENDOSCOPY  2014       Social:  Social History[3]    Family History:  Family History   Problem Relation Name Age of Onset    Heart attack Mother      Stroke Father      Heart failure Brother      Colon cancer Neg Hx      Inflammatory bowel disease Neg Hx         PHYSICAL:  /64 (BP Location: Left arm, Patient Position: Sitting)   Pulse (!) 59   Ht 5' 4" (1.626 m)   Wt 83.6 kg (184 lb 6.4 oz)   LMP 01/21/1973 (LMP Unknown)   BMI 31.65 kg/m²     General Medical Examination:  General: Good hygiene, appropriate appearance. Using oxygen.   HEENT: Normocephalic, atraumatic.   Chest: Unlabored breathing.     Mental Status:  Mood/Affect: " Appropriate/congruent.  Level of consciousness: Awake, alert.  Orientation: Oriented to person, place, time and situation.  Language: Normal fluency, able to name, repeat, and follow complex multi-step commands    Cranial nerves:  III, IV, VI: EOMI but left eye esophoria noted (chronic)  VII: Facial muscle activation intact and symmetric over the bilateral upper and lower face    Motor:   Mild right sided weakness noted in both upper and lower extremities proximally  Fasciculations/Atrophy: none  Tone: normal  Bradykinesia: none  Tremors: no tremors but she does have mild low amplitude myoclonus present distally in the BUE    DTRs:  1+ throughout    Gait:  -Arises from chair with assist from two people  Can take a step but immediately legs begin to give out and she must sit back down    Laboratory Data:    Lab Results   Component Value Date    TSH 2.559 11/10/2024    FREET4 1.10 09/30/2022     Lab Results   Component Value Date    SAWHQMTY29 846 02/19/2019         Imaging:  No results found. However, due to the size of the patient record, not all encounters were searched. Please check Results Review for a complete set of results.    C Spine MRI - severe central canal narrowing C5/6      Assessment//Plan:   Problem List Items Addressed This Visit    None  Visit Diagnoses         Imbalance    -  Primary      Myoclonus              Patient with issues with reported tremors, balance, and cognition for the last year.     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    Her weakness and impaired balance is likely from her extensive C spine and L spine disease. Possibly multi level radiculopathy as well as some mild myelopathy. 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     Discussed my impression with her and family. Could consider further evaluation of spine issues with surgery if her  clinical status allows. Can consider reducing gabapentin if myoclonus becomes more bothersome. Careful blood sugar management per PCP.     RTC 1 year    I spent a total of 48 minutes on the day of the visit.This includes face to face time and non-face to face time preparing to see the patient (eg, review of tests), obtaining and/or reviewing separately obtained history, documenting clinical information in the electronic or other health record, independently interpreting results and communicating results to the patient/family/caregiver, or care coordinator.          Alvarez Whitehead MD  [facility]  Department of Neurology  Movement Disorders            [1]   Outpatient Encounter Medications as of 2/17/2025   Medication Sig Dispense Refill    albuterol-ipratropium (DUO-NEB) 2.5 mg-0.5 mg/3 mL nebulizer solution Take 3 mLs by nebulization every 4 (four) hours as needed for Wheezing or Shortness of Breath.      apixaban (ELIQUIS) 5 mg Tab Take 1 tablet (5 mg total) by mouth 2 (two) times daily. 180 tablet 3    ascorbic acid, vitamin C, (VITAMIN C) 500 MG tablet Take 1,000 mg by mouth once daily.       calcium carbonate/vitamin D3 (CALTRATE 600 + D ORAL) Take 1 tablet by mouth once daily.      carvediloL (COREG) 12.5 MG tablet Take 1 tablet (12.5 mg total) by mouth 2 (two) times daily with meals. 180 tablet 3    clopidogreL (PLAVIX) 75 mg tablet Take 1 tablet (75 mg total) by mouth once daily. 90 tablet 3    estradioL (ESTRACE) 0.01 % (0.1 mg/gram) vaginal cream Place vaginally.      fexofenadine (ALLEGRA) 60 MG tablet Take 60 mg by mouth once daily.      fluconazole (DIFLUCAN) 150 MG Tab Take 150 mg by mouth every 7 days.      furosemide (LASIX) 40 MG tablet Take 1 tablet (40 mg total) by mouth 2 (two) times a day. 180 tablet 3    gabapentin (NEURONTIN) 300 MG capsule Take 1 capsule (300 mg total) by mouth every evening. 30 capsule 11    losartan (COZAAR) 50 MG tablet Take 1 tablet (50 mg total) by mouth once  daily. 90 tablet 3    montelukast (SINGULAIR) 10 mg tablet Take 10 mg by mouth once daily.      pantoprazole (PROTONIX) 40 MG tablet Take 1 tablet (40 mg total) by mouth once daily. 90 tablet 3    ranolazine (RANEXA) 1,000 mg Tb12 TAKE 1 TABLET BY MOUTH TWICE A  tablet 3    senna (SENOKOT) 8.6 mg tablet Take 1 tablet by mouth 2 (two) times a day. 60 tablet 11    spironolactone (ALDACTONE) 25 MG tablet Take 1 tablet (25 mg total) by mouth once daily. 30 tablet 11    SYMBICORT 160-4.5 mcg/actuation HFAA Inhale 2 puffs into the lungs 2 (two) times daily.      atorvastatin (LIPITOR) 10 MG tablet Take 1 tablet (10 mg total) by mouth once daily. 30 tablet 0    DULoxetine (CYMBALTA) 20 MG capsule Take 1 capsule (20 mg total) by mouth once daily. 30 capsule 0    isosorbide mononitrate (IMDUR) 60 MG 24 hr tablet Take 1 tablet (60 mg total) by mouth once daily. 30 tablet 0    metFORMIN (GLUCOPHAGE) 500 MG tablet Take 1 tablet (500 mg total) by mouth 2 (two) times daily. 60 tablet 0    nitrofurantoin (MACRODANTIN) 100 MG capsule Take 100 mg by mouth Daily.       Facility-Administered Encounter Medications as of 2/17/2025   Medication Dose Route Frequency Provider Last Rate Last Admin    0.9%  NaCl infusion   Intravenous Continuous Lynette Hightower NP   New Bag at 05/29/24 1440    sodium chloride 0.9% flush 5 mL  5 mL Intravenous PRN Lynette Hightower NP       [2]   Patient Active Problem List  Diagnosis    Enlarged ovary    Type 2 diabetes mellitus    Severe persistent asthma with acute exacerbation    HLD (hyperlipidemia)    GERD (gastroesophageal reflux disease)    Chronic mesenteric ischemia    Mesenteric artery insufficiency    Gastroesophageal reflux disease    Essential hypertension    Pure hypercholesterolemia    Permanent atrial fibrillation    Old lacunar stroke without late effect    COPD exacerbation    Severe aortic stenosis    Incisional hernia, without obstruction or gangrene    UTI (urinary  tract infection)    COPD (chronic obstructive pulmonary disease)    Costochondritis    Diabetic polyneuropathy associated with type 2 diabetes mellitus    DDD (degenerative disc disease), lumbar    Cataract    Balance problems    Decreased range of motion of trunk and back    Lumbar spondylosis    Neuroforaminal stenosis of lumbar spine    Spinal stenosis of lumbar region with neurogenic claudication    Lumbar radiculopathy    S/P TAVR (transcatheter aortic valve replacement)    Obesity    Vasovagal syncope    TACOS (acute kidney injury)    Atrial flutter    Hyponatremia    Dysuria    Acute cystitis without hematuria    CAD (coronary artery disease)    Asthma in adult    Chronic heart failure with preserved ejection fraction    Localized edema    History of transcatheter aortic valve replacement (TAVR)    Coronary artery disease involving native coronary artery of native heart without angina pectoris    Aortic atherosclerosis    Acute on chronic diastolic congestive heart failure    Occlusion of superior mesenteric artery    Pacemaker    Chest discomfort    Severe obesity with body mass index (BMI) of 35.0 to 39.9 with comorbidity    Acute on chronic respiratory failure with hypoxia    Anemia    Hyperkalemia    Hypomagnesemia    Pleural effusion    Anxiety    Irritant contact dermatitis due to friction or contact with body fluids, unspecified   [3]   Social History  Socioeconomic History    Marital status:    Tobacco Use    Smoking status: Never    Smokeless tobacco: Never   Substance and Sexual Activity    Alcohol use: No    Drug use: Never    Sexual activity: Not Currently     Partners: Male     Social Drivers of Health     Financial Resource Strain: Low Risk  (11/10/2024)    Overall Financial Resource Strain (CARDIA)     Difficulty of Paying Living Expenses: Not hard at all   Food Insecurity: No Food Insecurity (11/10/2024)    Hunger Vital Sign     Worried About Running Out of Food in the Last Year: Never  true     Ran Out of Food in the Last Year: Never true   Transportation Needs: No Transportation Needs (11/10/2024)    TRANSPORTATION NEEDS     Transportation : No   Physical Activity: Inactive (11/10/2024)    Exercise Vital Sign     Days of Exercise per Week: 0 days     Minutes of Exercise per Session: 0 min   Stress: Patient Declined (11/10/2024)    [place] [employer] - Occupational Stress Questionnaire     Feeling of Stress : Patient declined   Housing Stability: Unknown (11/10/2024)    Housing Stability Vital Sign     Unable to Pay for Housing in the Last Year: No     Homeless in the Last Year: No

## 2025-02-18 ENCOUNTER — CLINICAL SUPPORT (OUTPATIENT)
Dept: REHABILITATION | Facility: HOSPITAL | Age: 80
End: 2025-02-18
Attending: INTERNAL MEDICINE
Payer: MEDICARE

## 2025-02-18 VITALS — HEART RATE: 60 BPM | RESPIRATION RATE: 16 BRPM | OXYGEN SATURATION: 95 %

## 2025-02-18 DIAGNOSIS — R26.9 GAIT ABNORMALITY: ICD-10-CM

## 2025-02-18 DIAGNOSIS — R53.1 WEAKNESS: ICD-10-CM

## 2025-02-18 DIAGNOSIS — J96.21 ACUTE ON CHRONIC RESPIRATORY FAILURE WITH HYPOXIA: ICD-10-CM

## 2025-02-18 DIAGNOSIS — Z74.09 IMPAIRED FUNCTIONAL MOBILITY AND ACTIVITY TOLERANCE: Primary | ICD-10-CM

## 2025-02-18 PROCEDURE — 97163 PT EVAL HIGH COMPLEX 45 MIN: CPT | Mod: PO

## 2025-02-18 PROCEDURE — 97530 THERAPEUTIC ACTIVITIES: CPT | Mod: PO

## 2025-02-18 NOTE — PROGRESS NOTES
Outpatient Rehab    Physical Therapy Evaluation    Patient Name: Adriana Strong  MRN: [id number]  YOB: 1945  Today's Date: 2/18/2025    Therapy Diagnosis:   Encounter Diagnoses   Name Primary?    Acute on chronic respiratory failure with hypoxia     Impaired functional mobility and activity tolerance Yes    Weakness     Gait abnormality      Physician: Jos Bpatiste MD    Physician Orders: Eval and Treat  Medical Diagnosis: Acute on chronic respiratory failure with hypoxia [J96.21]     Visit # / Visits Authorized:  1 / 1   Date of Evaluation:  2/18/2025   Insurance Authorization Period:  2/4/2025 - 2/4/2026   Plan of Care Certification:  2/18/2025 to 4/29/2025      Time In: 0350   Time Out: 0450  Total Time: 60   Total Billable Time: 60 minutes    Intake Outcome Measure for FOTO Survey    Therapist reviewed FOTO scores for Adriana Strong on 2/18/2025.   FOTO report - see Media section or FOTO account episode details.     Intake Score: 32%    Precautions  Right Lower Extremity Weight-Bearing Status: Full weight-bearing  Left Lower Extremity Weight-Bearing Status: Full weight-bearing  Pacemaker, falls, standard, CHF, on supplemental O2.       Subjective   History of Present Illness  Adriana is a 80 y.o. female who reports to physical therapy with a chief concern of Weakness. According to the patient's chart, Adriana has a past medical history of Acute on chronic diastolic (congestive) heart failure, Anticoagulant long-term use, Anxiety, Arthritis, Asthma, Atrial fibrillation, Atrial fibrillation with RVR, Cataracts, bilateral, Chest pain, atypical, Chronic atrial fibrillation with rapid ventricular response, Colon polyp, Coronary artery disease, Diabetes mellitus, Dry eyes, Esophageal erosions, General anesthetics causing adverse effect in therapeutic use, GERD (gastroesophageal reflux disease), Heart murmur, Hemorrhoid, High cholesterol, Hypertension, Irritable bowel syndrome, Paroxysmal  atrial fibrillation, Persistent atrial fibrillation, Shingles, Stenosis of aortic and mitral valves, Stroke, TIA (transient ischemic attack), and Use of cane as ambulatory aid. Adriana has a past surgical history that includes Total knee arthroplasty (Bilateral); nose polyp; Breast surgery; Tonsillectomy; left nasal polyp; Hysterectomy (partial); Total abdominal hysterectomy; left neck lymph node; right hip fatty mass tissue; Upper gastrointestinal endoscopy (2014); Colonoscopy (2014); stent to small intestine; angiocele; Superior vena cava angioplasty / stenting; Hernia repair; Colonoscopy (N/A, 3/14/2018); Left heart catheterization (Right, 11/5/2019); Aortic valve replacement (N/A, 11/19/2019); Cardiac valuve replacement; Abdominal surgery; Treatment of cardiac arrhythmia (N/A, 2/10/2020); Treatment of cardiac arrhythmia (N/A, 5/15/2020); Coronary angiography (N/A, 2/15/2024); Phlebography (Left, 5/1/2024); Implantation of biventricular permanent pacemaker as upgrade to existing pacemaker (Left, 5/29/2024); and echocardiogram,transesophageal (N/A, 12/11/2024).    The patient reports a medical diagnosis of Acute on chronic respiratory failure with hypoxia (J96.21).    Diagnostic tests related to this condition: X-ray.   X-Ray Details: Clothing artifact is present.  Similar degree of reticular interstitial changes throughout the lungs.  Improved lung volumes on the current study when compared to the most recent study.  The lungs are free of new pulmonary opacities.  The cardiac silhouette size is enlarged.  The trachea is midline and the mediastinal width is normal. Negative for focal infiltrate, effusion or pneumothorax. Pulmonary vasculature is normal. Negative for osseous abnormalities. Stable TAVR device.  Stable 3 lead left subclavian pacemaker.  Tortuous aorta with calcifications of the aortic knob.  There are degenerative changes of the spine and both shoulder girdles.  Stable convex-left curvature of the  thoracic spine.    History of Present Condition/Illness: She was hospitalized due to weakness, SOB, and fluid. She walks with a walker, but feels like she cannot do that right now. She can do exercises sitting down but her legs are wobbly when standing and she requires assistance while walking. She is hopes to improve her current level if function so she can have a cardio surgery for stent placement. She has not had any fall is the past couple weeks.     Activities of Daily Living  Social history was obtained from Patient and Spouse.    General Prior Level of Function Comments: indpednent  General Current Level of Function Comments: needs assiatnce with walking, needs assiatnce with dressing.  Patient Roles: Caregiver for pet  Patient Responsibilities: Community mobility, Driving, Financial management, Health management, Home management, Meal prep, Laundry, Personal ADL, Shopping    Previously independent with activities of daily living? Yes                Previously independent with instrumental activities of daily living? Yes     Currently independent with instrumental activities of daily living? No              Pain     Patient reports a current pain level of 0/10. Pain at best is reported as 0/10.    Location: no pain  Clinical Progression (since onset): Stable         Living Arrangements  Living Situation  Living Arrangements: Spouse/significant other          Past Medical History/Physical Systems Review:   Adriana Strong  has a past medical history of Acute on chronic diastolic (congestive) heart failure, Anticoagulant long-term use, Anxiety, Arthritis, Asthma, Atrial fibrillation, Atrial fibrillation with RVR, Cataracts, bilateral, Chest pain, atypical, Chronic atrial fibrillation with rapid ventricular response, Colon polyp, Coronary artery disease, Diabetes mellitus, Dry eyes, Esophageal erosions, General anesthetics causing adverse effect in therapeutic use, GERD (gastroesophageal reflux disease), Heart  murmur, Hemorrhoid, High cholesterol, Hypertension, Irritable bowel syndrome, Paroxysmal atrial fibrillation, Persistent atrial fibrillation, Shingles, Stenosis of aortic and mitral valves, Stroke, TIA (transient ischemic attack), and Use of cane as ambulatory aid.    Adriana Strong  has a past surgical history that includes Total knee arthroplasty (Bilateral); nose polyp; Breast surgery; Tonsillectomy; left nasal polyp; Hysterectomy (partial); Total abdominal hysterectomy; left neck lymph node; right hip fatty mass tissue; Upper gastrointestinal endoscopy (2014); Colonoscopy (2014); stent to small intestine; angiocele; Superior vena cava angioplasty / stenting; Hernia repair; Colonoscopy (N/A, 3/14/2018); Left heart catheterization (Right, 11/5/2019); Aortic valve replacement (N/A, 11/19/2019); Cardiac valuve replacement; Abdominal surgery; Treatment of cardiac arrhythmia (N/A, 2/10/2020); Treatment of cardiac arrhythmia (N/A, 5/15/2020); Coronary angiography (N/A, 2/15/2024); Phlebography (Left, 5/1/2024); Implantation of biventricular permanent pacemaker as upgrade to existing pacemaker (Left, 5/29/2024); and echocardiogram,transesophageal (N/A, 12/11/2024).    Adriana has a current medication list which includes the following prescription(s): albuterol-ipratropium, apixaban, ascorbic acid (vitamin c), atorvastatin, calcium carbonate/vitamin d3, carvedilol, clopidogrel, duloxetine, estradiol, fexofenadine, fluconazole, furosemide, gabapentin, isosorbide mononitrate, losartan, metformin, montelukast, nitrofurantoin, pantoprazole, ranolazine, senna, spironolactone, and symbicort, and the following Facility-Administered Medications: 0.9% nacl and sodium chloride 0.9%.    Review of patient's allergies indicates:   Allergen Reactions    Celebrex [celecoxib] Shortness Of Breath    Ciprofloxacin Swelling     lip    Dicyclomine Hives    Adhesive Dermatitis    Avelox [moxifloxacin] Swelling    Cilostazol Other (See  Comments)     Elevates blood pressure    Eryc [erythromycin] Other (See Comments)     unknown    Iodine and iodide containing products Hives    Keflex [cephalexin] Hives     Tolerated ceftriaxone (11/11/2024)    Meclomen      rashes    Meloxicam      Ear ringing    Metoclopramide Other (See Comments)     High blood pressure    Sulfa (sulfonamide antibiotics) Itching        Objective   Vital Signs  Pulse 60   Resp 16   LMP 01/21/1973 (LMP Unknown)   SpO2 95%            2L of O2            Hip Strength - Planes of Motion   Right Strength Right Pain Left Strength Left  Pain   Flexion (L2) 4   4     Extension 4   4     ABduction 4   4     ADduction 4   4     Internal Rotation 4   4     External Rotation 4   4         Knee Strength   Right Strength Right Pain Left Strength Left  Pain   Flexion (S2) 4   4     Prone Flexion           Extension (L3) 4   4                Bed Mobility Assessment  Rolling Assistance: Minimal assist  Sidelying to Sit Assistance: Minimal assist  Sit to Sidelying Assistance: Minimal assist  Scooting to Edge of Bed Assistance: Minimal assist  Bridge/Boost to Head of Bed Assistance: Minimal assist      Fall Risk  Functional mobility test results suggest the patient is: At Risk for Falls  Timed Up & Go (TUG)  Time: 64 seconds  Observations: Loss of balance  An older adult who takes >=12 seconds to complete the TUG is at risk for falling.       Sit to Stand Testing  The patient completed 5 sit to stand transfers in 42 sec.               Ambulation Assistance Required  Surface With  Assistive Device Without Assistive Device Details   Level Minimal assist        Uneven Minimal assist Moderate assist     Curb Unable         Stairs Assistance Required   Assistance Level Upper Extremity Support Pattern   Ascending Unable       Descending Unable            Ambulation Details  Ambulation distance was 8 meters.      Gait Analysis  Gait Pattern: Ataxic  Walking Speed: Decreased            Gait Analysis  Details  In rolling walker. Requires min A         Treatment:  Therapeutic Activity  Therapeutic Activity 1: Home Exercise Program education and instruction which can be found under patient instructions.    Assessment & Plan   Assessment  Adriana presents with a condition of High complexity.   Presentation of Symptoms: Evolving  Will Comorbidities Impact Care: Yes  See Past medical history    Functional Limitations: Activity tolerance, Ambulating on uneven surfaces, Bed mobility, Carrying objects, Community integration, Completing self-care activities, Completing work/school activities, Decreased ambulation distance/endurance, Disrupted sleep pattern, Driving, Fine motor coordination, Functional mobility, Gait limitations, Getting off the floor, Gross motor coordination, Increased risk of fall, Maintaining balance, Manipulating objects, Pain when reaching, Pain with ADLs/IADLs, Painful locomotion/ambulation, Participating in leisure activities, Performing household chores, Participating in sports, Proprioception, Range of motion, Reaching, Sitting tolerance, Squatting, Standing tolerance, Transfers  Impairments: Abnormal coordination, Abnormal gait, Abnormal muscle firing, Abnormal muscle tone, Abnormal or restricted range of motion, Activity intolerance, Impaired balance, Impaired physical strength, Lack of appropriate home exercise program, Pain with functional activity, Safety issue, Weight-bearing intolerance  Personal Factors Affecting Prognosis: Pain, Physical limitations    Patient Goal for Therapy (PT): improve mobility  Prognosis: Good  Prognosis Details: Patient has good prognosis when past medical history, prior level of function, current level of function, and objective measurements take in initial evaluation are considered.  Assessment Details: Patient presents with signs and symptoms consistent with their medical diagnosis. Patient presents with impaired functional mobility, endurance, range of  motion, gait, and strength. Patient also presents with increased fatigue and shakiness that occurs with excessive functional activity and simple transfers. At this time, the patients limitations are preventing them from performing hobbies and ADLs around their house without increased difficulty, fatigue, and discomfort.    Plan  From a physical therapy perspective, the patient would benefit from: Skilled Rehab Services    Planned therapy interventions include: Therapeutic exercise, Therapeutic activities, Neuromuscular re-education, Manual therapy, ADLs/IADLs, Canalith repositioning, Cognitive functional training, Community/work reintegration, Gait training, Orthotic management and training, Sensory integration, Work conditioning, Work hardening, and Wound care.    Planned modalities to include: Biofeedback, Cryotherapy (cold pack), Electrical stimulation - attended, Electrical stimulation - passive/unattended, Mechanical traction, Paraffin bath, Thermotherapy (hot pack), and Ultrasound.        Visit Frequency: 2 times Per Week for 8 Weeks.       This plan was discussed with Patient, Therapy assistant, and Family.   Discussion participants: Agreed Upon Plan of Care  Plan details: Progress with current plan of care as tolerated using selected interventions. Monitor O2 as needed.           Patient's spiritual, cultural, and educational needs considered and patient agreeable to plan of care and goals.     Education  Education was done with Patient. The patient's learning style includes Demonstration, Listening, and Pictures/video. The patient Demonstrates understanding and Verbalizes understanding.         HEP       Goals:   Active       Long Term Goals       Patient will demonstrate a 4+/5 or better on all tested MMT in order to improvr functional mobility and reduce risks for compensation.         Start:  02/18/25    Expected End:  04/29/25            Patient will retrun back to prior level of function in order to  improve quality of life.        Start:  02/18/25    Expected End:  04/29/25            Patient will stand for 3 minutes straight with no rest break in rolling walker       Start:  02/18/25    Expected End:  04/29/25               Short Term Goals       Patient will demonstrate 100% compliance with their Home Exercise Porgram in order to improve their current level of function.         Start:  02/18/25    Expected End:  03/25/25            Patient will perform TUG in 58 seconds with rolling walker in order to improve dynamic balance.         Start:  02/18/25    Expected End:  03/25/25            Patient will perform 5xSTS test in 35 seconds in order to improve functional lower extremity strength.         Start:  02/18/25    Expected End:  03/25/25                Rafiq Grijalva, PT

## 2025-02-19 ENCOUNTER — OFFICE VISIT (OUTPATIENT)
Dept: UROLOGY | Facility: CLINIC | Age: 80
End: 2025-02-19
Payer: MEDICARE

## 2025-02-19 VITALS
HEART RATE: 84 BPM | SYSTOLIC BLOOD PRESSURE: 118 MMHG | BODY MASS INDEX: 31.75 KG/M2 | WEIGHT: 184.94 LBS | DIASTOLIC BLOOD PRESSURE: 66 MMHG

## 2025-02-19 DIAGNOSIS — N95.8 GENITOURINARY SYNDROME OF MENOPAUSE: ICD-10-CM

## 2025-02-19 DIAGNOSIS — R31.0 GROSS HEMATURIA: Primary | ICD-10-CM

## 2025-02-19 DIAGNOSIS — N39.0 RECURRENT UTI: ICD-10-CM

## 2025-02-19 PROCEDURE — 99999 PR PBB SHADOW E&M-EST. PATIENT-LVL V: CPT | Mod: PBBFAC,,,

## 2025-02-19 RX ORDER — PREDNISONE 50 MG/1
TABLET ORAL
Qty: 3 TABLET | Refills: 0 | Status: SHIPPED | OUTPATIENT
Start: 2025-02-19

## 2025-02-19 RX ORDER — DIPHENHYDRAMINE HCL 50 MG
CAPSULE ORAL
Qty: 1 CAPSULE | Refills: 0 | Status: SHIPPED | OUTPATIENT
Start: 2025-02-19

## 2025-02-19 NOTE — PROGRESS NOTES
[facility]      New  Hematuria  Note    2/19/2025    Referred by:  Shikha Raymundo MD    History of Present Illness    CHIEF COMPLAINT:  Patient presents today for recurrent urinary tract infections and gross hematuria    GENITOURINARY:  She reports experiencing recurrent urinary tract infections, with three infections in the past six months. Infectious episodes are marked by symptoms including burning, frequency, pain, and nocturia. She feels symptomatic today. She has experienced multiple episodes of gross hematuria over the past couple months..    MEDICATIONS:  She uses Estradiol cream vaginally.    ALLERGIES:  She has a known allergy to iodine.        A review of 10+ systems was conducted with pertinent positive and negative findings documented in HPI with all other systems reviewed and negative.    Past medical, family, surgical and social history reviewed as documented in chart with pertinent positive medical, family, surgical and social history detailed in HPI.    Exam Findings:    Vaginal Mucosa: moderate atrophy Const: no acute distress, conversant and alert  Eyes: anicteric, extraocular muscles intact  ENMT: normocephalic, Nl oral membranes  Cardio: no cyanosis, nl cap refill  Pulm: no tachypnea; no resp distress  Abd: soft, no tenderness  Musc: no laceration, no tenderness  Neuro: alert; oriented x 3  Skin: warm, dry; no petichiae  Psych: no anxiety; normal speech       Assessment/Plan:    Assessment & Plan    IMPRESSION:  - Investigating recurrent UTIs due to patient experiencing 3 infections in past 6 months  - Dr. Thompson to perform cystoscopy to examine bladder for underlying causes  - Performing catheterization to assess bladder drainage and obtain sterile urine sample for culture  - Ordered CT urogram to evaluate gross hematuria, with premedication due to iodine allergy    RECURRENT URINARY TRACT INFECTIONS:  1. Performed catheterization for sterile urine sample collection and  bladder drainage assessment.  2. Ordered urine culture  3. Noted that the patient has experienced 3 urinary tract infections in the last 6 months.  4. Patient reports burning, frequency, and pain as symptoms of urinary tract infections.  5. Patient feels symptomatic today and believes she currently has an infection.    HEMATURIA:  1. Scheduled CT urogram with premedication due to patient's iodine allergy.  2. Referred the patient to Dr. Thompson for cystoscopy.    IODINE ALLERGY:  1. Initiated prednisone and diphenhydramine as premedication for CT urogram.  2. Noted the patient's allergy to iodine, which affects the approach to diagnostic imaging.  3. Instructions reviewed with patient, spouse, and daughter for premedication         Visit complexity today is associated with medical care services that are part of the ongoing care related to the single serious and/or complex condition of Recurrent urinary tract infections (Kim[place]lee) and Genitourinary syndrome of menopause (GSM). A longitudinal relationship exists or is being developed between the patient and this practitioner for the care of this condition.

## 2025-02-21 ENCOUNTER — HOSPITAL ENCOUNTER (OUTPATIENT)
Dept: RADIOLOGY | Facility: HOSPITAL | Age: 80
Discharge: HOME | End: 2025-02-21
Payer: MEDICARE

## 2025-02-21 DIAGNOSIS — R31.9 HEMATURIA: Primary | ICD-10-CM

## 2025-02-21 DIAGNOSIS — R31.0 GROSS HEMATURIA: ICD-10-CM

## 2025-02-21 PROCEDURE — 74178 CT ABD&PLV WO CNTR FLWD CNTR: CPT | Mod: TC,PN

## 2025-02-21 PROCEDURE — 25500020 PHARM REV CODE 255: Mod: PN

## 2025-02-21 PROCEDURE — 74178 CT ABD&PLV WO CNTR FLWD CNTR: CPT | Mod: 26,,, | Performed by: RADIOLOGY

## 2025-02-21 RX ADMIN — IOHEXOL 100 ML: 350 INJECTION, SOLUTION INTRAVENOUS at 01:02

## 2025-02-25 ENCOUNTER — CLINICAL SUPPORT (OUTPATIENT)
Dept: REHABILITATION | Facility: HOSPITAL | Age: 80
End: 2025-02-25
Payer: MEDICARE

## 2025-02-25 DIAGNOSIS — Z74.09 IMPAIRED FUNCTIONAL MOBILITY AND ACTIVITY TOLERANCE: Primary | ICD-10-CM

## 2025-02-25 DIAGNOSIS — R53.1 WEAKNESS: ICD-10-CM

## 2025-02-25 DIAGNOSIS — R26.9 GAIT ABNORMALITY: ICD-10-CM

## 2025-02-25 PROCEDURE — 97530 THERAPEUTIC ACTIVITIES: CPT | Mod: PO

## 2025-02-25 PROCEDURE — 97110 THERAPEUTIC EXERCISES: CPT | Mod: PO

## 2025-02-25 PROCEDURE — 97116 GAIT TRAINING THERAPY: CPT | Mod: PO

## 2025-02-25 NOTE — PROGRESS NOTES
Outpatient Rehab    Physical Therapy Visit    Patient Name: Adriana Strong  MRN: [id number]  YOB: 1945  Encounter Date: 2025    Therapy Diagnosis:   Encounter Diagnoses   Name Primary?    Impaired functional mobility and activity tolerance Yes    Weakness     Gait abnormality      Physician: Jos Baptiste MD    Physician Orders: Eval and Treat  Medical Diagnosis: Acute on chronic respiratory failure with hypoxia [J96.21]      Visit # / Visits Authorized:     Date of Evaluation:  2025   Insurance Authorization Period:  2025 - 2026   Plan of Care Certification:  2025 to 2025      Time In: 0200   Time Out: 0300  Total Time: 60   Total Billable Time: 58    FOTO:  Intake Score:  %  Survey Score 1:  %  Survey Score 2:  %         Subjective   She is doing well. The sit to stands have been diifcult for her at home..  Pain reported as 0/10.      Objective            Treatment:  Therapeutic Exercise  Therapeutic Exercise Activity 1: Nustep 2 x 5' bouts at Lv 2  Therapeutic Exercise Activity 2: Heel raises at bar with CGA 2 x 10    Therapeutic Activity  Therapeutic Activity 1: Sit to stands: 3 x 5  Therapeutic Activity 2: mini squats at bar 10x with CGA  Therapeutic Activity 3: Sustained upright standin' x 5 reps    Gait Training  Gait Training Activity 1: Gait around clinic: 10 feet x 3 bouts. min A with rolling walker and wheel chair follow    Assessment & Plan   Assessment: Patient reports for follow up treatment with no change in complaint of symptoms. Patient tolerated today's session with moderate complaints of feeling short of breathe and her legs wanting to give out on her. Today's treatment included upright standing, quad strengthening, and gait interventions.  They remain limited in enduracne and strength. Pateint is progressing well towards their goals.  Evaluation/Treatment Tolerance: Patient tolerated treatment well    Patient will continue to benefit from  skilled outpatient physical therapy to address the deficits listed in the problem list box on initial evaluation, provide pt/family education and to maximize pt's level of independence in the home and community environment.     Patient's spiritual, cultural, and educational needs considered and patient agreeable to plan of care and goals.           Plan: Continue to progress plan of care as tolerated.    Goals:   Active       Long Term Goals       Patient will demonstrate a 4+/5 or better on all tested MMT in order to improvr functional mobility and reduce risks for compensation.   (Progressing)       Start:  02/18/25    Expected End:  04/29/25            Patient will retrun back to prior level of function in order to improve quality of life.  (Progressing)       Start:  02/18/25    Expected End:  04/29/25            Patient will stand for 3 minutes straight with no rest break in rolling walker (Progressing)       Start:  02/18/25    Expected End:  04/29/25               Short Term Goals       Patient will demonstrate 100% compliance with their Home Exercise Porgram in order to improve their current level of function.   (Progressing)       Start:  02/18/25    Expected End:  03/25/25            Patient will perform TUG in 58 seconds with rolling walker in order to improve dynamic balance.   (Progressing)       Start:  02/18/25    Expected End:  03/25/25            Patient will perform 5xSTS test in 35 seconds in order to improve functional lower extremity strength.   (Progressing)       Start:  02/18/25    Expected End:  03/25/25                Rafiq Grijalva PT

## 2025-02-26 ENCOUNTER — TELEPHONE (OUTPATIENT)
Dept: CARDIOLOGY | Facility: CLINIC | Age: 80
End: 2025-02-26
Payer: MEDICARE

## 2025-02-26 ENCOUNTER — CLINICAL SUPPORT (OUTPATIENT)
Dept: REHABILITATION | Facility: HOSPITAL | Age: 80
End: 2025-02-26
Payer: MEDICARE

## 2025-02-26 DIAGNOSIS — R53.1 WEAKNESS: ICD-10-CM

## 2025-02-26 DIAGNOSIS — R26.9 GAIT ABNORMALITY: ICD-10-CM

## 2025-02-26 DIAGNOSIS — Z74.09 IMPAIRED FUNCTIONAL MOBILITY AND ACTIVITY TOLERANCE: Primary | ICD-10-CM

## 2025-02-26 PROCEDURE — 97530 THERAPEUTIC ACTIVITIES: CPT | Mod: PO

## 2025-02-26 PROCEDURE — 97110 THERAPEUTIC EXERCISES: CPT | Mod: PO

## 2025-02-26 PROCEDURE — 97116 GAIT TRAINING THERAPY: CPT | Mod: PO

## 2025-02-26 NOTE — PROGRESS NOTES
Clinic Note  2/26/2025      Subjective:       Patient ID:  Adriana is a 80 y.o. female being seen for a follow up    Chief Complaint: Follow up    Adriana Strong is an 79 year old female with aortic stenosis s/p TAVR with concerns of prosthetic valve stenosis, persistent atrial fibrillation with slow ventricular response (previously on PPM, now with upgrade to Bi-V),  HTN, HLD, non-obstructive CAD ( [facility] in 02/2024), HFrecEF (EF 35-> 65%), COPD, chronic hypoxemic respiratory failure on 2L home O2 who presents for f/u. Patient was last seen by me in December. She subsequently had a PEDRO that demonstrated degeneration of the prosthetic valve leaflets that appears severely restricted likely secondary to pannus formation. She was evaluated by the valve clinic for possible Hernan but was too weak at the time so prehab was suggested. Her Physical therapy is expected to end on 4/29/25. She started last week with PT and feels fine with exercises. She is fully dependent for her ADLs. Recently had to take an extra pill for weight change, otherwise weight has stayed relatively stable.          Review of Systems   Constitutional:  Negative for chills and fever.   Respiratory:  Positive for shortness of breath.    Cardiovascular:  Negative for chest pain, leg swelling and PND.   Gastrointestinal: Negative.  Negative for blood in stool and melena.   Neurological:  Negative for dizziness.       Medication List with Changes/Refills   Current Medications    ALBUTEROL-IPRATROPIUM (DUO-NEB) 2.5 MG-0.5 MG/3 ML NEBULIZER SOLUTION    Take 3 mLs by nebulization every 4 (four) hours as needed for Wheezing or Shortness of Breath.    APIXABAN (ELIQUIS) 5 MG TAB    Take 1 tablet (5 mg total) by mouth 2 (two) times daily.    ASCORBIC ACID, VITAMIN C, (VITAMIN C) 500 MG TABLET    Take 1,000 mg by mouth once daily.     ATORVASTATIN (LIPITOR) 10 MG TABLET    Take 1 tablet (10 mg total) by mouth once daily.    CALCIUM CARBONATE/VITAMIN D3 (CALTRATE  600 + D ORAL)    Take 1 tablet by mouth once daily.    CARVEDILOL (COREG) 12.5 MG TABLET    Take 1 tablet (12.5 mg total) by mouth 2 (two) times daily with meals.    CLOPIDOGREL (PLAVIX) 75 MG TABLET    Take 1 tablet (75 mg total) by mouth once daily.    DIPHENHYDRAMINE (BENADRYL) 50 MG CAPSULE    Take 50mg by mouth 1 hour before contrast administration.    DULOXETINE (CYMBALTA) 20 MG CAPSULE    Take 1 capsule (20 mg total) by mouth once daily.    ESTRADIOL (ESTRACE) 0.01 % (0.1 MG/GRAM) VAGINAL CREAM    Place vaginally.    FEXOFENADINE (ALLEGRA) 60 MG TABLET    Take 60 mg by mouth once daily.    FLUCONAZOLE (DIFLUCAN) 150 MG TAB    Take 150 mg by mouth every 7 days.    FUROSEMIDE (LASIX) 40 MG TABLET    Take 1 tablet (40 mg total) by mouth 2 (two) times a day.    GABAPENTIN (NEURONTIN) 300 MG CAPSULE    Take 1 capsule (300 mg total) by mouth every evening.    ISOSORBIDE MONONITRATE (IMDUR) 60 MG 24 HR TABLET    Take 1 tablet (60 mg total) by mouth once daily.    LOSARTAN (COZAAR) 50 MG TABLET    Take 1 tablet (50 mg total) by mouth once daily.    METFORMIN (GLUCOPHAGE) 500 MG TABLET    Take 1 tablet (500 mg total) by mouth 2 (two) times daily.    MONTELUKAST (SINGULAIR) 10 MG TABLET    Take 10 mg by mouth once daily.    NITROFURANTOIN (MACRODANTIN) 100 MG CAPSULE    Take 100 mg by mouth Daily.    PANTOPRAZOLE (PROTONIX) 40 MG TABLET    Take 1 tablet (40 mg total) by mouth once daily.    PREDNISONE (DELTASONE) 50 MG TAB    Take 50mg by mouth at 13 hours, 7 hours, and 1 hour before contrast administration.    RANOLAZINE (RANEXA) 1,000 MG TB12    TAKE 1 TABLET BY MOUTH TWICE A DAY    SENNA (SENOKOT) 8.6 MG TABLET    Take 1 tablet by mouth 2 (two) times a day.    SPIRONOLACTONE (ALDACTONE) 25 MG TABLET    Take 1 tablet (25 mg total) by mouth once daily.    SYMBICORT 160-4.5 MCG/ACTUATION HFAA    Inhale 2 puffs into the lungs 2 (two) times daily.       Problem List[1]        Objective:      /60 (Patient Position:  "Sitting)   Pulse 60   Ht 5' 4" (1.626 m)   Wt 81.6 kg (180 lb)   LMP 01/21/1973 (LMP Unknown)   SpO2 100%   BMI 30.90 kg/m²   Estimated body mass index is 31.75 kg/m² as calculated from the following:    Height as of 2/17/25: 5' 4" (1.626 m).    Weight as of 2/19/25: 83.9 kg (184 lb 15.5 oz).  Physical Exam  Constitutional:       General: She is not in acute distress.     Appearance: She is normal weight. She is not toxic-appearing or diaphoretic.   HENT:      Head: Normocephalic.      Mouth/Throat:      Mouth: Mucous membranes are moist.   Eyes:      Extraocular Movements: Extraocular movements intact.   Neck:      Vascular: No hepatojugular reflux or JVD.   Cardiovascular:      Rate and Rhythm: Normal rate and regular rhythm.      Pulses: Normal pulses.      Heart sounds: Murmur heard.      No gallop.   Pulmonary:      Effort: Pulmonary effort is normal.   Abdominal:      General: Abdomen is flat. Bowel sounds are normal. There is no distension.      Tenderness: There is no abdominal tenderness.   Musculoskeletal:         General: No swelling (Bilateral 1+ pitting edema) or tenderness. Normal range of motion.   Skin:     General: Skin is warm.   Neurological:      Mental Status: She is alert and oriented to person, place, and time. Mental status is at baseline.   Psychiatric:         Mood and Affect: Mood normal.         Behavior: Behavior normal.         Thought Content: Thought content normal.           Assessment and Plan:     Stenosis of prosthetic aortic valve  S/P TAVR (transcatheter aortic valve replacement)  Patient has a 26 mm Phan Suzi valve. Noted to have severe prosthetic stenosis noted on PEDRO 2/2 to degeneration from pannus. She is symptomatic.   Recently saw Dr. Baptiste and recommended pre-hab. Currently undergoing PT until 4/26/25. Will need f/u with Dr. Baptiset afterwards.       Chronic respiratory failure with hypoxia  -     On 2L of home Oxygen      Essential hypertension  Controlled. " Continue current antihypertensive regimen.      Chronic heart failure with preserved ejection fraction  Euvolemic today.     CRT-P in place  Managed by Dr. Moeller     Hyperlipidemia, unspecified hyperlipidemia type  Continue statin. LDL is within goal.      Type 2 diabetes mellitus with other specified complication, unspecified whether long term insulin use  Managed by PCP.       Permanent atrial fibrillation  Continue eliquis  She has CRT-P in place and is Bi-V paced  Last device check in September was reviewed.      Recurrent UTI  -    Evaluated by urology     Mild-Moderate tricuspid insufficiency  Noted on TTE. Will evaluate with PEDRO    Chronic atrial fibrillation  - Continue eliquis.   Follow Up:   F/u scheduled with Dr. Day in may         Plan discussed with attending Dr. Parrish, further recommendations as per attending addendum. Please feel free to call with any questions or concerns.        Shikha Raymundo MD  Cardiovascular Disease  Fellow Physician - PGY6               [1]   Patient Active Problem List  Diagnosis    Enlarged ovary    Type 2 diabetes mellitus    Severe persistent asthma with acute exacerbation    HLD (hyperlipidemia)    GERD (gastroesophageal reflux disease)    Chronic mesenteric ischemia    Mesenteric artery insufficiency    Gastroesophageal reflux disease    Essential hypertension    Pure hypercholesterolemia    Permanent atrial fibrillation    Old lacunar stroke without late effect    COPD exacerbation    Severe aortic stenosis    Incisional hernia, without obstruction or gangrene    UTI (urinary tract infection)    COPD (chronic obstructive pulmonary disease)    Costochondritis    Diabetic polyneuropathy associated with type 2 diabetes mellitus    DDD (degenerative disc disease), lumbar    Cataract    Balance problems    Decreased range of motion of trunk and back    Lumbar spondylosis    Neuroforaminal stenosis of lumbar spine    Spinal stenosis of lumbar region with neurogenic  claudication    Lumbar radiculopathy    S/P TAVR (transcatheter aortic valve replacement)    Obesity    Vasovagal syncope    TACOS (acute kidney injury)    Atrial flutter    Hyponatremia    Dysuria    Acute cystitis without hematuria    CAD (coronary artery disease)    Asthma in adult    Chronic heart failure with preserved ejection fraction    Localized edema    History of transcatheter aortic valve replacement (TAVR)    Coronary artery disease involving native coronary artery of native heart without angina pectoris    Aortic atherosclerosis    Acute on chronic diastolic congestive heart failure    Occlusion of superior mesenteric artery    Pacemaker    Chest discomfort    Severe obesity with body mass index (BMI) of 35.0 to 39.9 with comorbidity    Acute on chronic respiratory failure with hypoxia    Anemia    Hyperkalemia    Hypomagnesemia    Pleural effusion    Anxiety    Irritant contact dermatitis due to friction or contact with body fluids, unspecified    Impaired functional mobility and activity tolerance    Weakness    Gait abnormality

## 2025-02-26 NOTE — PROGRESS NOTES
Outpatient Rehab    Physical Therapy Visit    Patient Name: Adriana Strong  MRN: [id number]  YOB: 1945  Encounter Date: 2025    Therapy Diagnosis:   Encounter Diagnoses   Name Primary?    Impaired functional mobility and activity tolerance Yes    Weakness     Gait abnormality      Physician: Jos Baptiste MD    Physician Orders: Eval and Treat  Medical Diagnosis: Acute on chronic respiratory failure with hypoxia [J96.21]      Visit # / Visits Authorized:     Date of Evaluation:  2025   Insurance Authorization Period:  2025 - 2026   Plan of Care Certification:  2025 to 2025      Time In: 0100   Time Out: 0200  Total Time: 60   Total Billable Time: 60 minutes    FOTO:  Intake Score:  %  Survey Score 1:  %  Survey Score 2:  %         Subjective   No new complaints since yesterday..  Pain reported as 0/10.      Objective            Treatment:  Therapeutic Exercise  Therapeutic Exercise Activity 1: Nustep 2 x 5' bouts at Lv 2  Therapeutic Exercise Activity 2: Heel raises at bar with CGA 2 x 10  Therapeutic Exercise Activity 3: biceps curls: 2 x 10 1# weights              Therapeutic Activity  Therapeutic Activity 1: Sit to stands: 3 x 5  Therapeutic Activity 2: mini squats at bar 10x with CGA  Therapeutic Activity 3: Sustained upright standin' x 5 reps  Therapeutic Activity 4: Wheel chair tricep extension with minimal range of motion    Gait Training  Gait Training Activity 1: Gait around clinic: 10 feet x 3 bouts. min A with rolling walker and wheel chair follow    Assessment & Plan   Assessment: Patient reports for follow up treatment with no change in complaint of symptoms. Patient tolerated today's session with moderate complaints of feeling short of breathe and leg and arm weakness. Today's treatment included upright standing, quad strengthening, and gait interventions.  They remain limited in enduracne and strength. Pateint is progressing well towards their  goals.  Evaluation/Treatment Tolerance: Patient tolerated treatment well    Patient will continue to benefit from skilled outpatient physical therapy to address the deficits listed in the problem list box on initial evaluation, provide pt/family education and to maximize pt's level of independence in the home and community environment.     Patient's spiritual, cultural, and educational needs considered and patient agreeable to plan of care and goals.           Plan: Continue to progress plan of care as tolerated.    Goals:   Active       Long Term Goals       Patient will demonstrate a 4+/5 or better on all tested MMT in order to improvr functional mobility and reduce risks for compensation.   (Progressing)       Start:  02/18/25    Expected End:  04/29/25            Patient will retrun back to prior level of function in order to improve quality of life.  (Progressing)       Start:  02/18/25    Expected End:  04/29/25            Patient will stand for 3 minutes straight with no rest break in rolling walker (Progressing)       Start:  02/18/25    Expected End:  04/29/25               Short Term Goals       Patient will demonstrate 100% compliance with their Home Exercise Porgram in order to improve their current level of function.   (Progressing)       Start:  02/18/25    Expected End:  03/25/25            Patient will perform TUG in 58 seconds with rolling walker in order to improve dynamic balance.   (Progressing)       Start:  02/18/25    Expected End:  03/25/25            Patient will perform 5xSTS test in 35 seconds in order to improve functional lower extremity strength.   (Progressing)       Start:  02/18/25    Expected End:  03/25/25                Rafiq Grijalva, PT

## 2025-02-27 ENCOUNTER — OFFICE VISIT (OUTPATIENT)
Dept: CARDIOLOGY | Facility: CLINIC | Age: 80
End: 2025-02-27
Payer: MEDICARE

## 2025-02-27 VITALS
HEART RATE: 60 BPM | DIASTOLIC BLOOD PRESSURE: 60 MMHG | BODY MASS INDEX: 30.73 KG/M2 | WEIGHT: 180 LBS | HEIGHT: 64 IN | SYSTOLIC BLOOD PRESSURE: 130 MMHG | OXYGEN SATURATION: 100 %

## 2025-02-27 DIAGNOSIS — E78.5 HYPERLIPIDEMIA, UNSPECIFIED HYPERLIPIDEMIA TYPE: ICD-10-CM

## 2025-02-27 DIAGNOSIS — Z95.3 S/P TAVR (TRANSCATHETER AORTIC VALVE REPLACEMENT): Primary | ICD-10-CM

## 2025-02-27 DIAGNOSIS — J42 CHRONIC BRONCHITIS, UNSPECIFIED CHRONIC BRONCHITIS TYPE: ICD-10-CM

## 2025-02-27 DIAGNOSIS — I50.32 CHRONIC HEART FAILURE WITH PRESERVED EJECTION FRACTION: ICD-10-CM

## 2025-02-27 DIAGNOSIS — E66.01 SEVERE OBESITY WITH BODY MASS INDEX (BMI) OF 35.0 TO 39.9 WITH COMORBIDITY: ICD-10-CM

## 2025-02-27 DIAGNOSIS — E11.69 TYPE 2 DIABETES MELLITUS WITH OTHER SPECIFIED COMPLICATION, UNSPECIFIED WHETHER LONG TERM INSULIN USE: ICD-10-CM

## 2025-02-27 DIAGNOSIS — I10 ESSENTIAL HYPERTENSION: ICD-10-CM

## 2025-02-27 DIAGNOSIS — Z74.09 IMPAIRED FUNCTIONAL MOBILITY AND ACTIVITY TOLERANCE: ICD-10-CM

## 2025-02-27 PROCEDURE — 3075F SYST BP GE 130 - 139MM HG: CPT | Mod: CPTII,GC,S$GLB, | Performed by: STUDENT IN AN ORGANIZED HEALTH CARE EDUCATION/TRAINING PROGRAM

## 2025-02-27 PROCEDURE — 3288F FALL RISK ASSESSMENT DOCD: CPT | Mod: CPTII,GC,S$GLB, | Performed by: STUDENT IN AN ORGANIZED HEALTH CARE EDUCATION/TRAINING PROGRAM

## 2025-02-27 PROCEDURE — 3078F DIAST BP <80 MM HG: CPT | Mod: CPTII,GC,S$GLB, | Performed by: STUDENT IN AN ORGANIZED HEALTH CARE EDUCATION/TRAINING PROGRAM

## 2025-02-27 PROCEDURE — 99214 OFFICE O/P EST MOD 30 MIN: CPT | Mod: GC,S$GLB,, | Performed by: STUDENT IN AN ORGANIZED HEALTH CARE EDUCATION/TRAINING PROGRAM

## 2025-02-27 PROCEDURE — 99999 PR PBB SHADOW E&M-EST. PATIENT-LVL V: CPT | Mod: PBBFAC,GC,, | Performed by: STUDENT IN AN ORGANIZED HEALTH CARE EDUCATION/TRAINING PROGRAM

## 2025-02-27 PROCEDURE — 1159F MED LIST DOCD IN RCRD: CPT | Mod: CPTII,GC,S$GLB, | Performed by: STUDENT IN AN ORGANIZED HEALTH CARE EDUCATION/TRAINING PROGRAM

## 2025-02-27 PROCEDURE — 1101F PT FALLS ASSESS-DOCD LE1/YR: CPT | Mod: CPTII,GC,S$GLB, | Performed by: STUDENT IN AN ORGANIZED HEALTH CARE EDUCATION/TRAINING PROGRAM

## 2025-02-27 PROCEDURE — 1157F ADVNC CARE PLAN IN RCRD: CPT | Mod: CPTII,GC,S$GLB, | Performed by: STUDENT IN AN ORGANIZED HEALTH CARE EDUCATION/TRAINING PROGRAM

## 2025-02-27 NOTE — PATIENT INSTRUCTIONS
FOR YOUR HEART DYSFUNCTION: This is the result of the heart not working efficiently, and fluid building up in the body (legs, belly, and lungs)  1. Check your weight every morning, first thing. If within 2-3lbs, take furosemide as prescribed        a. If your weight is 2-3 lbs higher than your dry weight, take a double dose of the furosemide prescribed.         b. if your weight continues to be 2-3 lbs higher the next day, take a double dose        c. if the higher dose does not reduce your weight (you are not peeing a lot after taking), then please call the clinic at [phone]        d. If your weight is 2-3lbs lower, then hold the dose of diuretic for that day. Resume once your weight returns to your dry weight        e. Resume normal dosing once your weight returns to dry weight     2. Eat a LOW-SALT diet, less than 2000 of salt per day. Check any food packaging to keep track of your salt, avoid eating fast food whenever possible. Avoid canned foods.     3. Keep track of fluid intake, try not to exceed 2000mL, or approximately 2 quarts of fluid daily, if you are gaining weight, reduce this to 1000mL or 1-1.5 quarts. Remember that fluids include water, soda, tea, coffee, soups.     4. Please exercise at least 30 minutes per day 5 times per week, this makes your heart stronger, and follow up with cardiac rehabilitation.     5. Call me in a week if blood pressures are not improved.

## 2025-03-03 ENCOUNTER — CLINICAL SUPPORT (OUTPATIENT)
Dept: REHABILITATION | Facility: HOSPITAL | Age: 80
End: 2025-03-03
Payer: MEDICARE

## 2025-03-03 DIAGNOSIS — R26.9 GAIT ABNORMALITY: ICD-10-CM

## 2025-03-03 DIAGNOSIS — Z74.09 IMPAIRED FUNCTIONAL MOBILITY AND ACTIVITY TOLERANCE: Primary | ICD-10-CM

## 2025-03-03 DIAGNOSIS — R53.1 WEAKNESS: ICD-10-CM

## 2025-03-03 PROCEDURE — 97110 THERAPEUTIC EXERCISES: CPT | Mod: PO,CQ

## 2025-03-03 PROCEDURE — 97530 THERAPEUTIC ACTIVITIES: CPT | Mod: PO,CQ

## 2025-03-03 NOTE — PROGRESS NOTES
Outpatient Rehab    Physical Therapy Visit    Patient Name: Adriana Strong  MRN: [id number]  YOB: 1945  Encounter Date: 3/3/2025    Therapy Diagnosis:   Encounter Diagnoses   Name Primary?    Impaired functional mobility and activity tolerance Yes    Weakness     Gait abnormality      Physician: Jos Baptiste MD    Physician Orders: Eval and Treat  Medical Diagnosis: Acute on chronic respiratory failure with hypoxia [J96.21]      Visit # / Visits Authorized:   ; 3/20  Date of Evaluation:  2025   Insurance Authorization Period:  2025 - 2026   Plan of Care Certification:  2025 to 2025      Time In: 1400   Time Out: 1450  Total Time: 50   Total Billable Time: 50'    FOTO:  Intake Score:  %  Survey Score 1:  %  Survey Score 2:  %         Subjective   Pt w/ no new complaints at this time.         Objective            Treatment:  Therapeutic Exercise  Therapeutic Exercise Activity 1: Nustep 2 x 5' bouts at Lv 2  Therapeutic Exercise Activity 2: Heel raises at bar with CGA 2 x 10  Therapeutic Exercise Activity 3: biceps curls: 2 x 10 1# weights              Therapeutic Activity  Therapeutic Activity 1: Sit to stands: 3 x 5  Therapeutic Activity 2: mini squats at bar 10x with CGA  Therapeutic Activity 3: Sustained upright standin' x 5 reps  Therapeutic Activity 4: Wheel chair tricep extension with minimal range of motion    Gait Training  Gait Training Activity 1: Gait around clinic: 10 feet x 3 bouts. min A with rolling walker and wheel chair follow    Assessment & Plan   Assessment: Pt tolerated therapy session well and w/o complaint. Multiple rest breaks taken throughout therapy session to reduce muscle fatigue and keep O2 levels high. O2 levels checked throughout exercise program w/ pt never demonstrating oxygen under 96. Also, pt unable to complete 5 reps of Static Stance due to pt increase of fatigue. Exercises challenged appropriately and pt demonstrated ability to  transfer w/ CGA w/o issue. Will continue to progress as tolerated.  Evaluation/Treatment Tolerance: Patient tolerated treatment well    Patient will continue to benefit from skilled outpatient physical therapy to address the deficits listed in the problem list box on initial evaluation, provide pt/family education and to maximize pt's level of independence in the home and community environment.     Patient's spiritual, cultural, and educational needs considered and patient agreeable to plan of care and goals.           Plan: Continue to progress plan of care as tolerated.    Goals:   Active       Long Term Goals       Patient will demonstrate a 4+/5 or better on all tested MMT in order to improvr functional mobility and reduce risks for compensation.   (Progressing)       Start:  02/18/25    Expected End:  04/29/25            Patient will retrun back to prior level of function in order to improve quality of life.  (Progressing)       Start:  02/18/25    Expected End:  04/29/25            Patient will stand for 3 minutes straight with no rest break in rolling walker (Progressing)       Start:  02/18/25    Expected End:  04/29/25               Short Term Goals       Patient will demonstrate 100% compliance with their Home Exercise Porgram in order to improve their current level of function.   (Progressing)       Start:  02/18/25    Expected End:  03/25/25            Patient will perform TUG in 58 seconds with rolling walker in order to improve dynamic balance.   (Progressing)       Start:  02/18/25    Expected End:  03/25/25            Patient will perform 5xSTS test in 35 seconds in order to improve functional lower extremity strength.   (Progressing)       Start:  02/18/25    Expected End:  03/25/25                Rafiq Verdugo PTA

## 2025-03-06 ENCOUNTER — CLINICAL SUPPORT (OUTPATIENT)
Dept: REHABILITATION | Facility: HOSPITAL | Age: 80
End: 2025-03-06
Payer: MEDICARE

## 2025-03-06 DIAGNOSIS — R26.9 GAIT ABNORMALITY: ICD-10-CM

## 2025-03-06 DIAGNOSIS — Z74.09 IMPAIRED FUNCTIONAL MOBILITY AND ACTIVITY TOLERANCE: Primary | ICD-10-CM

## 2025-03-06 DIAGNOSIS — R53.1 WEAKNESS: ICD-10-CM

## 2025-03-06 PROCEDURE — 97110 THERAPEUTIC EXERCISES: CPT | Mod: PO

## 2025-03-06 PROCEDURE — 97530 THERAPEUTIC ACTIVITIES: CPT | Mod: PO

## 2025-03-06 PROCEDURE — 97116 GAIT TRAINING THERAPY: CPT | Mod: PO

## 2025-03-06 NOTE — PROGRESS NOTES
Outpatient Rehab    Physical Therapy Visit    Patient Name: Adriana Strong  MRN: [id number]  YOB: 1945  Encounter Date: 3/6/2025    Therapy Diagnosis:   Encounter Diagnoses   Name Primary?    Impaired functional mobility and activity tolerance Yes    Weakness     Gait abnormality      Physician: Jos Baptiste MD       Physician Orders: Eval and Treat  Medical Diagnosis: Acute on chronic respiratory failure with hypoxia [J96.21]      Visit # / Visits Authorized:   ;   Date of Evaluation:  2025   Insurance Authorization Period:  2025 - 2026   Plan of Care Certification:  2025 to 2025     Time In: 0850   Time Out: 0950  Total Time: 60   Total Billable Time: 60 minutes    FOTO:  Intake Score:  %  Survey Score 1:  %  Survey Score 2:  %         Subjective   She has been doing well. no new complaints..  Pain reported as 0/10.      Objective            Treatment:  Therapeutic Exercise  Therapeutic Exercise Activity 1: Nustep 10' at Lv 2  Therapeutic Exercise Activity 2: Heel raises at bar with CGA 2 x 10  Therapeutic Exercise Activity 3: biceps curls: 2 x 10 1# weights    Therapeutic Activity  Therapeutic Activity 1: Sit to stands: 3 x 5  Therapeutic Activity 2: mini squats at bar 10x with CGA  Therapeutic Activity 3: Sustained upright standin' x 5 reps  Therapeutic Activity 4: Wheel chair tricep extension with minimal range of motion  Therapeutic Activity 5: Stair taps: 4 inch step: 10x each leg    Gait Training  Gait Training Activity 1: Gait around clinic: 10 feet x 3 bouts. min A with rolling walker and wheel chair follow    Assessment & Plan   Assessment: Patient reports for follow up treatment with decrease in complaint of symptoms. Patient tolerated today's session with no complaints. Today's treatment included weightbearing and cardiovascular interventions. Patient tolerated progressions well and included stair taps for enhance functional activity in weight  bearing.  They remain limited in exercise tolerence and endurance. Pateint is progressing well towards their goals.  Evaluation/Treatment Tolerance: Patient tolerated treatment well    Patient will continue to benefit from skilled outpatient physical therapy to address the deficits listed in the problem list box on initial evaluation, provide pt/family education and to maximize pt's level of independence in the home and community environment.     Patient's spiritual, cultural, and educational needs considered and patient agreeable to plan of care and goals.           Plan: Continue to progress plan of care as tolerated.    Goals:   Active       Long Term Goals       Patient will demonstrate a 4+/5 or better on all tested MMT in order to improvr functional mobility and reduce risks for compensation.   (Progressing)       Start:  02/18/25    Expected End:  04/29/25            Patient will retrun back to prior level of function in order to improve quality of life.  (Progressing)       Start:  02/18/25    Expected End:  04/29/25            Patient will stand for 3 minutes straight with no rest break in rolling walker (Progressing)       Start:  02/18/25    Expected End:  04/29/25               Short Term Goals       Patient will demonstrate 100% compliance with their Home Exercise Porgram in order to improve their current level of function.   (Progressing)       Start:  02/18/25    Expected End:  03/25/25            Patient will perform TUG in 58 seconds with rolling walker in order to improve dynamic balance.   (Progressing)       Start:  02/18/25    Expected End:  03/25/25            Patient will perform 5xSTS test in 35 seconds in order to improve functional lower extremity strength.   (Progressing)       Start:  02/18/25    Expected End:  03/25/25                Rafiq Grijalva, PT

## 2025-03-10 ENCOUNTER — CLINICAL SUPPORT (OUTPATIENT)
Dept: REHABILITATION | Facility: HOSPITAL | Age: 80
End: 2025-03-10
Payer: MEDICARE

## 2025-03-10 DIAGNOSIS — R26.9 GAIT ABNORMALITY: ICD-10-CM

## 2025-03-10 DIAGNOSIS — R53.1 WEAKNESS: ICD-10-CM

## 2025-03-10 DIAGNOSIS — Z74.09 IMPAIRED FUNCTIONAL MOBILITY AND ACTIVITY TOLERANCE: Primary | ICD-10-CM

## 2025-03-10 PROCEDURE — 97530 THERAPEUTIC ACTIVITIES: CPT | Mod: PO,CQ

## 2025-03-10 PROCEDURE — 97110 THERAPEUTIC EXERCISES: CPT | Mod: PO,CQ

## 2025-03-10 NOTE — PROGRESS NOTES
Outpatient Rehab    Physical Therapy Visit    Patient Name: Adriana Strong  MRN: [id number]  YOB: 1945  Encounter Date: 3/10/2025    Therapy Diagnosis:   Encounter Diagnoses   Name Primary?    Impaired functional mobility and activity tolerance Yes    Weakness     Gait abnormality      Physician: Jos Baptiste MD    Physician Orders: Eval and Treat  Medical Diagnosis: Acute on chronic respiratory failure with hypoxia [J96.21]      Visit # / Visits Authorized:   ;   Date of Evaluation:  2025   Insurance Authorization Period:  2025 - 2026   Plan of Care Certification:  2025 to 2025     Time In: 1500   Time Out: 1550  Total Time: 50   Total Billable Time: 50    FOTO:  Intake Score:  %  Survey Score 1:  %  Survey Score 2:  %         Subjective   Pt states that she has no new complaints at this time.         Objective            Treatment:  Therapeutic Exercise  Therapeutic Exercise Activity 1: Nustep 10' at Lv 2  Therapeutic Exercise Activity 2: Heel raises at bar with CGA 2 x 10  Therapeutic Exercise Activity 3: biceps curls: 2 x 10 1# weights              Therapeutic Activity  Therapeutic Activity 1: Sit to stands: 3 x 5  Therapeutic Activity 2: mini squats at bar 10x with CGA  Therapeutic Activity 3: Sustained upright standin' x 5 reps  Therapeutic Activity 4: Wheel chair tricep extension with minimal range of motion    Assessment & Plan   Assessment: Pt tolerated therapy session well w/ goals to improve standing time, ambulation distance, and functional mobility. Multiple bouts of rest breakstaken throughout therapy session to check O2 levels and reduce fatigue (pt O2 levels never went lower than 96). Pt ambulated 40ft and 20ft in 2 attempts w/ pt legs fatiguing out at end of second attempt. Also, pt only able to perform 3/5 standing attempts due to fatigue levels in radha UE. Will continue to progress as tolerated.  Evaluation/Treatment Tolerance: Patient  tolerated treatment well    Patient will continue to benefit from skilled outpatient physical therapy to address the deficits listed in the problem list box on initial evaluation, provide pt/family education and to maximize pt's level of independence in the home and community environment.     Patient's spiritual, cultural, and educational needs considered and patient agreeable to plan of care and goals.           Plan: Continue to progress plan of care as tolerated.    Goals:   Active       Long Term Goals       Patient will demonstrate a 4+/5 or better on all tested MMT in order to improvr functional mobility and reduce risks for compensation.   (Progressing)       Start:  02/18/25    Expected End:  04/29/25            Patient will retrun back to prior level of function in order to improve quality of life.  (Progressing)       Start:  02/18/25    Expected End:  04/29/25            Patient will stand for 3 minutes straight with no rest break in rolling walker (Progressing)       Start:  02/18/25    Expected End:  04/29/25               Short Term Goals       Patient will demonstrate 100% compliance with their Home Exercise Porgram in order to improve their current level of function.   (Progressing)       Start:  02/18/25    Expected End:  03/25/25            Patient will perform TUG in 58 seconds with rolling walker in order to improve dynamic balance.   (Progressing)       Start:  02/18/25    Expected End:  03/25/25            Patient will perform 5xSTS test in 35 seconds in order to improve functional lower extremity strength.   (Progressing)       Start:  02/18/25    Expected End:  03/25/25                Rafiq Verdugo PTA

## 2025-03-12 ENCOUNTER — CLINICAL SUPPORT (OUTPATIENT)
Dept: REHABILITATION | Facility: HOSPITAL | Age: 80
End: 2025-03-12
Payer: MEDICARE

## 2025-03-12 DIAGNOSIS — R26.9 GAIT ABNORMALITY: ICD-10-CM

## 2025-03-12 DIAGNOSIS — Z74.09 IMPAIRED FUNCTIONAL MOBILITY AND ACTIVITY TOLERANCE: Primary | ICD-10-CM

## 2025-03-12 DIAGNOSIS — R53.1 WEAKNESS: ICD-10-CM

## 2025-03-12 PROCEDURE — 97530 THERAPEUTIC ACTIVITIES: CPT | Mod: PO

## 2025-03-12 PROCEDURE — 97116 GAIT TRAINING THERAPY: CPT | Mod: PO

## 2025-03-12 PROCEDURE — 97110 THERAPEUTIC EXERCISES: CPT | Mod: PO

## 2025-03-12 NOTE — PROGRESS NOTES
Outpatient Rehab    Physical Therapy Visit    Patient Name: Adriana Strong  MRN: [id number]  YOB: 1945  Encounter Date: 3/12/2025    Therapy Diagnosis:   Encounter Diagnoses   Name Primary?    Impaired functional mobility and activity tolerance Yes    Weakness     Gait abnormality      Physician: Jos Baptiste MD    Physician Orders: Eval and Treat  Medical Diagnosis: Acute on chronic respiratory failure with hypoxia [J96.21]      Visit # / Visits Authorized:   ;   Date of Evaluation:  2025   Insurance Authorization Period:  2025 - 2026   Plan of Care Certification:  2025 to 2025     Time In: 0100   Time Out: 0200  Total Time: 60   Total Billable Time: 60 minutes    FOTO:  Intake Score:  %  Survey Score 1:  %  Survey Score 2:  %         Subjective   She feels like she is getting stronger..  Pain reported as 0/10.      Objective            Treatment:  Therapeutic Exercise  Therapeutic Exercise Activity 1: Nustep 10' at Lv 2  Therapeutic Exercise Activity 2: Heel raises at bar with CGA 2 x 10              Therapeutic Activity  Therapeutic Activity 1: Sit to stands: 3 x 5  Therapeutic Activity 2: mini squats at bar 3 x 10 with CGA  Therapeutic Activity 3: Sustained upright standin' x 2 reps, 1.5' x 2 reps    Gait Training  Gait Training Activity 1: Gait around clinic: 10 feet x 3 bouts. min A with rolling walker and wheel chair follow    Assessment & Plan   Assessment: Patient reports for follow up treatment with no change in complaint of symptoms. Patient tolerated today's session with complaints. Today's treatment included standing, cardiovascular endurance, and strengthening interventions. Patient tolerated progressions well and included increased duration or standing.  They remain limited in functional mobility. Pateint is progressing well towards their goals.  Evaluation/Treatment Tolerance: Patient tolerated treatment well    Patient will continue to benefit  from skilled outpatient physical therapy to address the deficits listed in the problem list box on initial evaluation, provide pt/family education and to maximize pt's level of independence in the home and community environment.     Patient's spiritual, cultural, and educational needs considered and patient agreeable to plan of care and goals.           Plan: Continue to progress plan of care as tolerated.    Goals:   Active       Long Term Goals       Patient will demonstrate a 4+/5 or better on all tested MMT in order to improvr functional mobility and reduce risks for compensation.   (Progressing)       Start:  02/18/25    Expected End:  04/29/25            Patient will retrun back to prior level of function in order to improve quality of life.  (Progressing)       Start:  02/18/25    Expected End:  04/29/25            Patient will stand for 3 minutes straight with no rest break in rolling walker (Progressing)       Start:  02/18/25    Expected End:  04/29/25               Short Term Goals       Patient will demonstrate 100% compliance with their Home Exercise Porgram in order to improve their current level of function.   (Progressing)       Start:  02/18/25    Expected End:  03/25/25            Patient will perform TUG in 58 seconds with rolling walker in order to improve dynamic balance.   (Progressing)       Start:  02/18/25    Expected End:  03/25/25            Patient will perform 5xSTS test in 35 seconds in order to improve functional lower extremity strength.   (Progressing)       Start:  02/18/25    Expected End:  03/25/25                Rafiq Grijalva PT

## 2025-03-17 ENCOUNTER — TELEPHONE (OUTPATIENT)
Dept: UROLOGY | Facility: CLINIC | Age: 80
End: 2025-03-17
Payer: MEDICARE

## 2025-03-17 ENCOUNTER — CLINICAL SUPPORT (OUTPATIENT)
Dept: REHABILITATION | Facility: HOSPITAL | Age: 80
End: 2025-03-17
Payer: MEDICARE

## 2025-03-17 DIAGNOSIS — R53.1 WEAKNESS: ICD-10-CM

## 2025-03-17 DIAGNOSIS — Z74.09 IMPAIRED FUNCTIONAL MOBILITY AND ACTIVITY TOLERANCE: Primary | ICD-10-CM

## 2025-03-17 DIAGNOSIS — R26.9 GAIT ABNORMALITY: ICD-10-CM

## 2025-03-17 PROCEDURE — 97530 THERAPEUTIC ACTIVITIES: CPT | Mod: PO

## 2025-03-17 PROCEDURE — 97140 MANUAL THERAPY 1/> REGIONS: CPT | Mod: PO

## 2025-03-17 PROCEDURE — 97110 THERAPEUTIC EXERCISES: CPT | Mod: PO

## 2025-03-17 NOTE — TELEPHONE ENCOUNTER
Left voicemail. Instructions given. Check in on 2nd floor. Please arrive 15 minutes prior to procedure and be ready to provide a urine specimen.

## 2025-03-18 ENCOUNTER — PROCEDURE VISIT (OUTPATIENT)
Facility: CLINIC | Age: 80
End: 2025-03-18
Payer: MEDICARE

## 2025-03-18 VITALS
SYSTOLIC BLOOD PRESSURE: 108 MMHG | TEMPERATURE: 98 F | DIASTOLIC BLOOD PRESSURE: 64 MMHG | RESPIRATION RATE: 19 BRPM | HEART RATE: 60 BPM

## 2025-03-18 DIAGNOSIS — N39.0 RECURRENT UTI: ICD-10-CM

## 2025-03-18 DIAGNOSIS — R31.0 GROSS HEMATURIA: ICD-10-CM

## 2025-03-18 RX ORDER — LIDOCAINE HYDROCHLORIDE 20 MG/ML
JELLY TOPICAL
Status: COMPLETED | OUTPATIENT
Start: 2025-03-18 | End: 2025-03-18

## 2025-03-18 RX ADMIN — LIDOCAINE HYDROCHLORIDE: 20 JELLY TOPICAL at 10:03

## 2025-03-18 NOTE — PATIENT INSTRUCTIONS
What to Expect After a Cystoscopy  For the next 24-48 hours, you may feel a mild burning when you urinate. This burning is normal and expected. Drink plenty of water to dilute the urine to help relieve the burning sensation. You may also see a small amount of blood in your urine after the procedure.    Unless you are already taking antibiotics, you may be given an antibiotic after the test to prevent infection.    Signs and Symptoms to Report  Call the [facility] at [phone] if you develop any of the following:  Fever of 101 degrees or higher  Chills or persistent bleeding  Inability to urinate

## 2025-03-18 NOTE — PROGRESS NOTES
Outpatient Rehab    Physical Therapy Visit    Patient Name: Adriana Strong  MRN: [id number]  YOB: 1945  Encounter Date: 3/17/2025    Therapy Diagnosis:   Encounter Diagnoses   Name Primary?    Impaired functional mobility and activity tolerance Yes    Weakness     Gait abnormality      Physician: Jos Baptiste MD    Physician Orders: Eval and Treat  Medical Diagnosis: Acute on chronic respiratory failure with hypoxia    Visit # / Visits Authorized:    Date of Evaluation:  2025   Insurance Authorization Period:  2025 - 2026   Plan of Care Certification:  2025 to 2025     PT/PTA: PT   Number of PTA visits since last PT visit:0  Time In: 0300   Time Out: 0400  Total Time: 60   Total Billable Time: 60 minutes    FOTO:  Intake Score:  %  Survey Score 1:  %  Survey Score 2:  %         Subjective   no new complaints today.  Pain reported as 0/10.      Objective            Treatment:  Therapeutic Exercise  TE 1: Nustep 10' at Lv 2  TE 2: Heel raises at bar with CGA 2 x 10  TE 3: biceps curls: 2 x 10 2# weights  Therapeutic Activity  TA 1: Sit to stands: 3 x 5  TA 2: mini squats at bar 2 x 10 with CGA  TA 3: Sustained upright standin ' x  2 reps + 1' for 1 rep = 5 minutes total  TA 4: Wheel chair tricep extension : 4 x 5  TA 5: Stair taps: 4 inch step: 10x each leg  Gait Training  GT 1: Gait around clinic: 30 feet + 50 feet. min A with rolling walker and wheel chair follow    Time Entry(in minutes):  Gait Training Time Entry: 15  Therapeutic Activity Time Entry: 25  Therapeutic Exercise Time Entry: 20    Assessment & Plan   Assessment: Patient reports for follow up treatment with decrease in complaint of symptoms. Patient tolerated today's session with minimal complaints of feeling short of breathe. Today's treatment included strengthening, cardio vascular, and functional mobility interventions. Patient tolerated progressions well and included increased duration of  standing.  They remain limited in  endurance and indepdnence with functional mobility. Pateint is progressing well towards their goals.  Evaluation/Treatment Tolerance: Patient tolerated treatment well    Patient will continue to benefit from skilled outpatient physical therapy to address the deficits listed in the problem list box on initial evaluation, provide pt/family education and to maximize pt's level of independence in the home and community environment.     Patient's spiritual, cultural, and educational needs considered and patient agreeable to plan of care and goals.           Plan: Continue to progress plan of care as tolerated.    Goals:   Active       Long Term Goals       Patient will demonstrate a 4+/5 or better on all tested MMT in order to improvr functional mobility and reduce risks for compensation.   (Progressing)       Start:  02/18/25    Expected End:  04/29/25            Patient will retrun back to prior level of function in order to improve quality of life.  (Progressing)       Start:  02/18/25    Expected End:  04/29/25            Patient will stand for 3 minutes straight with no rest break in rolling walker (Progressing)       Start:  02/18/25    Expected End:  04/29/25               Short Term Goals       Patient will demonstrate 100% compliance with their Home Exercise Porgram in order to improve their current level of function.   (Progressing)       Start:  02/18/25    Expected End:  03/25/25            Patient will perform TUG in 58 seconds with rolling walker in order to improve dynamic balance.   (Progressing)       Start:  02/18/25    Expected End:  03/25/25            Patient will perform 5xSTS test in 35 seconds in order to improve functional lower extremity strength.   (Progressing)       Start:  02/18/25    Expected End:  03/25/25                Rafiq Grijalva, PT

## 2025-03-18 NOTE — PROCEDURES
Office Cystourethroscopy Note    Date: 3/18/2025   Referring Provider: Elisabeth Daugherty NP     Reason for Cystoscopy: Gross Hematuria    Upper Tract Evaluation:   CT Urogram: No acute findings in the abdomen and pelvis.  No explanation for hematuria.    Procedure Details: Informed consent was obtained and she was sterily prepped and 1% lidocaine jelly was injected per urethra. A flexible cystoscope was inserted into the bladder via the urethra. There was no evidence of stricture, stenosis, lesions, other obstruction, or diverticulum. Significant findings included a normal unobstructed urethra only. Cystoscopic examination of the bladder revealed orthotopically positioned, normal bilateral ureteral orifices with clear yellow urine effluxing from each orifice. All mucosal surfaces were examined with no apparent stones, tumors, foreign bodies, erythema, trabeculation, diverticula, or ulcers. The procedure was concluded without complications. The patient was not administered a post-procedure antibiotic.     Specimens: No Specimens    Findings: Normal bladder and urethra    Other Findings:  PVR - 25 mL    Pelvic Exam:  Vaginal Mucosa: moderate atrophy  Anterior: none  Apical: no apical descent  Posterior: none  Urethral Lesions: no lesions or masses     Assesment and Plan:   Gross Hematuria: No obvious concerning etiology identified on cystoscopy today or upper urinary tract imaging. If gross hematuria persists, I would consider cystoscopy at the time of gross hematuria episode to attempt to identify source of bleeding or if bleeding lateralizes to either ureter or possible bilateral ureteroscopy.           New Recurrent Urinary Tract Infection Evaluation Note    3/18/2025    Referred by:  Elisabeth Daugherty NP    The patient has not been previously seen by a specialist board-certified in [employer] in our system previously and is meeting with me today for specialty care.     HPI: She reports experiencing recurrent urinary  tract infections, with three infections in the past six months. Infectious episodes are marked by symptoms including burning, frequency, pain, and nocturia. She feels symptomatic today. She has experienced multiple episodes of gross hematuria over the past couple months..     She had begun taking the topical vaginal estrogen cream. She believes that she is taking the D-mannose. We will give her a handout so that she can confirm.       Previous evaluation for her infections have included cystoscopy and CT abdomen with contrast. Previous treatments for her recurrent infections have included antibiotics for each infection, topical vaginal estrogen, and D-mannose.     A review of 10+ systems was conducted with pertinent positive and negative findings documented in HPI with all other systems reviewed and negative.    Past medical, family, surgical and social history reviewed as documented in chart with pertinent positive medical, family, surgical and social history detailed in HPI.    Exam Findings:    Vaginal Mucosa: moderate atrophy  Anterior: none  Apical: no apical descent  Posterior: none  Urethral Lesions: no lesions or masses Gen: no acute distress, conversant  Eyes: anicteric, extraocular muscles intact  Lungs: normal inspiration, no retractions  GI: soft, non-tender, no distension  Psych: appropriate affect, alert and oriented       Assessment/Plan:    Recurrent Urinary Tract Infection (New, addt'l workup): Recommend that she continue the topical vaginal estrogen as well as begin the D-mannose if she is not taking this (they will confirm). Return in 3 months to assess response of Kimberlee to this regimen.

## 2025-03-19 ENCOUNTER — CLINICAL SUPPORT (OUTPATIENT)
Dept: REHABILITATION | Facility: HOSPITAL | Age: 80
End: 2025-03-19
Payer: MEDICARE

## 2025-03-19 DIAGNOSIS — Z74.09 IMPAIRED FUNCTIONAL MOBILITY AND ACTIVITY TOLERANCE: Primary | ICD-10-CM

## 2025-03-19 DIAGNOSIS — R26.9 GAIT ABNORMALITY: ICD-10-CM

## 2025-03-19 DIAGNOSIS — R53.1 WEAKNESS: ICD-10-CM

## 2025-03-19 PROCEDURE — 97530 THERAPEUTIC ACTIVITIES: CPT | Mod: PO,CQ

## 2025-03-19 PROCEDURE — 97110 THERAPEUTIC EXERCISES: CPT | Mod: PO,CQ

## 2025-03-19 NOTE — PROGRESS NOTES
Outpatient Rehab    Physical Therapy Visit    Patient Name: Adriana Strong  MRN: [id number]  YOB: 1945  Encounter Date: 3/19/2025    Therapy Diagnosis:   Encounter Diagnoses   Name Primary?    Impaired functional mobility and activity tolerance Yes    Weakness     Gait abnormality      Physician: Jos Baptiste MD    Physician Orders: Eval and Treat  Medical Diagnosis: Acute on chronic respiratory failure with hypoxia    Visit # / Visits Authorized:    Date of Evaluation: 25  Insurance Authorization Period: 2025 to 2025  Plan of Care Certification:  2025 to 2025     PT/PTA: PTA   Number of PTA visits since last PT visit:1  Time In: 1300   Time Out: 1350  Total Time: 50   Total Billable Time: 50    FOTO:  Intake Score:  %  Survey Score 1:  %  Survey Score 2:  %         Subjective   Pt states that her arm was hurting her a bit this morning after trying to reach someting behind her, however pain has dissapted now.         Objective            Treatment:  Therapeutic Exercise  TE 1: Nustep 10' at Lv 2  TE 2: Heel raises at bar with CGA 2 x 10  TE 3: biceps curls: 2 x 10 2# weights  Therapeutic Activity  TA 1: Sit to stands: 3 x 5  TA 2: mini squats at bar 2 x 10 with CGA  TA 3: Sustained upright standin ' x  2 reps + 1' for 1 rep = 5 minutes total  TA 4: Wheel chair tricep extension : 4 x 5  TA 5: Stair taps: 4 inch step: 10x each leg  Gait Training  GT 1: Gait around clinic: 30 feet + 50 feet. min A with rolling walker and wheel chair follow- NP due to fatigue    Time Entry(in minutes):  Therapeutic Activity Time Entry: 30  Therapeutic Exercise Time Entry: 20    Assessment & Plan   Assessment: Pt tolerated therapy session well and w/o complaint. Multiple rest breaks taken throughout therapy session to reduce fatigue and keep O2 levels high. O2 levels taken throughout therapy session. Mod fatigue noted when performing standing exercises w/ pt unable to stand for  full minute through some sets. Pt more fatigued on this date, so held gait training till next visit. Exercises challenged appropriately. Will continue to progress as tolerated.  Evaluation/Treatment Tolerance: Patient tolerated treatment well    Patient will continue to benefit from skilled outpatient physical therapy to address the deficits listed in the problem list box on initial evaluation, provide pt/family education and to maximize pt's level of independence in the home and community environment.     Patient's spiritual, cultural, and educational needs considered and patient agreeable to plan of care and goals.           Plan: Continue to progress plan of care as tolerated.    Goals:   Active       Long Term Goals       Patient will demonstrate a 4+/5 or better on all tested MMT in order to improvr functional mobility and reduce risks for compensation.   (Progressing)       Start:  02/18/25    Expected End:  04/29/25            Patient will retrun back to prior level of function in order to improve quality of life.  (Progressing)       Start:  02/18/25    Expected End:  04/29/25            Patient will stand for 3 minutes straight with no rest break in rolling walker (Progressing)       Start:  02/18/25    Expected End:  04/29/25               Short Term Goals       Patient will demonstrate 100% compliance with their Home Exercise Porgram in order to improve their current level of function.   (Progressing)       Start:  02/18/25    Expected End:  03/25/25            Patient will perform TUG in 58 seconds with rolling walker in order to improve dynamic balance.   (Progressing)       Start:  02/18/25    Expected End:  03/25/25            Patient will perform 5xSTS test in 35 seconds in order to improve functional lower extremity strength.   (Progressing)       Start:  02/18/25    Expected End:  03/25/25                Rafiq Verdugo PTA

## 2025-03-24 ENCOUNTER — CLINICAL SUPPORT (OUTPATIENT)
Dept: REHABILITATION | Facility: HOSPITAL | Age: 80
End: 2025-03-24
Payer: MEDICARE

## 2025-03-24 DIAGNOSIS — R26.9 GAIT ABNORMALITY: ICD-10-CM

## 2025-03-24 DIAGNOSIS — Z74.09 IMPAIRED FUNCTIONAL MOBILITY AND ACTIVITY TOLERANCE: Primary | ICD-10-CM

## 2025-03-24 DIAGNOSIS — R53.1 WEAKNESS: ICD-10-CM

## 2025-03-24 PROCEDURE — 97116 GAIT TRAINING THERAPY: CPT | Mod: PO

## 2025-03-24 PROCEDURE — 97530 THERAPEUTIC ACTIVITIES: CPT | Mod: PO

## 2025-03-24 PROCEDURE — 97110 THERAPEUTIC EXERCISES: CPT | Mod: PO

## 2025-03-24 NOTE — PROGRESS NOTES
Outpatient Rehab    Physical Therapy Progress Note    Patient Name: Adriana Strong  MRN: [id number]  YOB: 1945  Encounter Date: 3/24/2025    Therapy Diagnosis:   Encounter Diagnoses   Name Primary?    Impaired functional mobility and activity tolerance Yes    Weakness     Gait abnormality      Physician: Jos Baptiste MD    Physician Orders: Eval and Treat  Medical Diagnosis: Acute on chronic respiratory failure with hypoxia    Visit # / Visits Authorized:    Insurance Authorization Period: 2025 to 2025  Insurance Authorization Period:  2025 - 2026   Plan of Care Certification:  2025 to 2025     PT/PTA:   0  Number of PTA visits since last PT visit:   Time In: 0200   Time Out: 0300  Total Time: 60   Total Billable Time: 60    FOTO:  Intake Score:  %  Survey Score 1:  %  Survey Score 2:  %         Subjective   PAtient reports she has had increased difficulty catching her breathe day..         Objective         Ambulation Assistance Required  Surface With  Assistive Device Without Assistive Device Details   Level Contact guard assist        Uneven Minimal assist       Curb Unable         Stairs Assistance Required   Assistance Level Upper Extremity Support Pattern   Ascending Unable       Descending Unable            Ambulation Details  Ambulation distance was 32 meters. 105 feet total    Gait Analysis  Gait Pattern: Antalgic                  Treatment:  Therapeutic Exercise  TE 1: Nustep 10' at Lv 1  Therapeutic Activity  TA 3: Sustained upright standin.5 minutes x 4  Gait Training  GT 1: Gait aorund clinic: 13 feet + 32 feet + 32 feet + 28 feet for total of 105 feet. rolling walker with CGA    Time Entry(in minutes):  Gait Training Time Entry: 25  Therapeutic Activity Time Entry: 25  Therapeutic Exercise Time Entry: 10    Assessment & Plan   Assessment: Patient reports for follow up treatment with increase in complaint of shortness of breathe symptoms and  chest tightness. Patient tolerated today's session with minimal complaints of chest tightness although O2 saturation remained above 90 with 2L supplemental O2. Today's treatment included increased standing time and increased distance for gait. After her last round of gait patient used inhaler due to shortness of breathe and she appeared to have no signs of distress upon leaving.   They remain limited in cardiovacualr endurance, strength, balance, and coordination. Pateint is progressing well towards their goals.  Evaluation/Treatment Tolerance: Patient tolerated treatment well    Patient will continue to benefit from skilled outpatient physical therapy to address the deficits listed in the problem list box on initial evaluation, provide pt/family education and to maximize pt's level of independence in the home and community environment.     Patient's spiritual, cultural, and educational needs considered and patient agreeable to plan of care and goals.           Plan: Continue to progress plan of care as tolerated.    Goals:   Active       Long Term Goals       Patient will demonstrate a 4+/5 or better on all tested MMT in order to improvr functional mobility and reduce risks for compensation.   (Progressing)       Start:  02/18/25    Expected End:  04/29/25            Patient will retrun back to prior level of function in order to improve quality of life.  (Progressing)       Start:  02/18/25    Expected End:  04/29/25            Patient will stand for 3 minutes straight with no rest break in rolling walker (Progressing)       Start:  02/18/25    Expected End:  04/29/25               Short Term Goals       Patient will demonstrate 100% compliance with their Home Exercise Porgram in order to improve their current level of function.   (Met)       Start:  02/18/25    Expected End:  03/25/25    Resolved:  03/24/25         Patient will perform TUG in 58 seconds with rolling walker in order to improve dynamic balance.    (Progressing)       Start:  02/18/25    Expected End:  03/25/25            Patient will perform 5xSTS test in 35 seconds in order to improve functional lower extremity strength.   (Progressing)       Start:  02/18/25    Expected End:  03/25/25                Rafiq Grijalva, PT

## 2025-03-27 ENCOUNTER — OFFICE VISIT (OUTPATIENT)
Facility: CLINIC | Age: 80
End: 2025-03-27
Payer: MEDICARE

## 2025-03-27 VITALS
OXYGEN SATURATION: 96 % | HEART RATE: 61 BPM | HEIGHT: 64 IN | DIASTOLIC BLOOD PRESSURE: 67 MMHG | BODY MASS INDEX: 30.9 KG/M2 | SYSTOLIC BLOOD PRESSURE: 113 MMHG

## 2025-03-27 DIAGNOSIS — G47.33 OSA (OBSTRUCTIVE SLEEP APNEA): ICD-10-CM

## 2025-03-27 DIAGNOSIS — J45.20 MILD INTERMITTENT ASTHMA IN ADULT WITHOUT COMPLICATION: ICD-10-CM

## 2025-03-27 DIAGNOSIS — J96.21 ACUTE ON CHRONIC RESPIRATORY FAILURE WITH HYPOXIA: ICD-10-CM

## 2025-03-27 DIAGNOSIS — J45.909 ASTHMA, UNSPECIFIED ASTHMA SEVERITY, UNSPECIFIED WHETHER COMPLICATED, UNSPECIFIED WHETHER PERSISTENT: Primary | ICD-10-CM

## 2025-03-27 PROCEDURE — 1125F AMNT PAIN NOTED PAIN PRSNT: CPT | Mod: CPTII,S$GLB,, | Performed by: INTERNAL MEDICINE

## 2025-03-27 PROCEDURE — 99999 PR PBB SHADOW E&M-EST. PATIENT-LVL V: CPT | Mod: PBBFAC,,, | Performed by: INTERNAL MEDICINE

## 2025-03-27 PROCEDURE — 1159F MED LIST DOCD IN RCRD: CPT | Mod: CPTII,S$GLB,, | Performed by: INTERNAL MEDICINE

## 2025-03-27 PROCEDURE — 1100F PTFALLS ASSESS-DOCD GE2>/YR: CPT | Mod: CPTII,S$GLB,, | Performed by: INTERNAL MEDICINE

## 2025-03-27 PROCEDURE — 1160F RVW MEDS BY RX/DR IN RCRD: CPT | Mod: CPTII,S$GLB,, | Performed by: INTERNAL MEDICINE

## 2025-03-27 PROCEDURE — 3074F SYST BP LT 130 MM HG: CPT | Mod: CPTII,S$GLB,, | Performed by: INTERNAL MEDICINE

## 2025-03-27 PROCEDURE — 99214 OFFICE O/P EST MOD 30 MIN: CPT | Mod: S$GLB,,, | Performed by: INTERNAL MEDICINE

## 2025-03-27 PROCEDURE — 3078F DIAST BP <80 MM HG: CPT | Mod: CPTII,S$GLB,, | Performed by: INTERNAL MEDICINE

## 2025-03-27 PROCEDURE — 3288F FALL RISK ASSESSMENT DOCD: CPT | Mod: CPTII,S$GLB,, | Performed by: INTERNAL MEDICINE

## 2025-03-27 PROCEDURE — 1157F ADVNC CARE PLAN IN RCRD: CPT | Mod: CPTII,S$GLB,, | Performed by: INTERNAL MEDICINE

## 2025-03-27 NOTE — ASSESSMENT & PLAN NOTE
Patient with Hypoxic Respiratory failure which is Chronic.  she is on home oxygen at 2 LPM. Supplemental oxygen was provided and noted- [unfilled].   Signs/symptoms of respiratory failure include- tachypnea and increased work of breathing. Contributing diagnoses includes - CHF Labs and images were reviewed. Patient Has not had a recent ABG. Will treat underlying causes and adjust management of respiratory failure as follows.
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
## 2025-03-31 ENCOUNTER — CLINICAL SUPPORT (OUTPATIENT)
Dept: REHABILITATION | Facility: HOSPITAL | Age: 80
End: 2025-03-31
Payer: MEDICARE

## 2025-03-31 DIAGNOSIS — R26.9 GAIT ABNORMALITY: ICD-10-CM

## 2025-03-31 DIAGNOSIS — R53.1 WEAKNESS: ICD-10-CM

## 2025-03-31 DIAGNOSIS — Z74.09 IMPAIRED FUNCTIONAL MOBILITY AND ACTIVITY TOLERANCE: Primary | ICD-10-CM

## 2025-03-31 PROCEDURE — 97110 THERAPEUTIC EXERCISES: CPT | Mod: PO

## 2025-03-31 PROCEDURE — 97530 THERAPEUTIC ACTIVITIES: CPT | Mod: PO

## 2025-03-31 PROCEDURE — 97116 GAIT TRAINING THERAPY: CPT | Mod: PO

## 2025-04-01 ENCOUNTER — CLINICAL SUPPORT (OUTPATIENT)
Dept: CARDIOLOGY | Facility: HOSPITAL | Age: 80
End: 2025-04-01
Attending: INTERNAL MEDICINE
Payer: MEDICARE

## 2025-04-01 ENCOUNTER — CLINICAL SUPPORT (OUTPATIENT)
Dept: CARDIOLOGY | Facility: HOSPITAL | Age: 80
End: 2025-04-01
Payer: MEDICARE

## 2025-04-01 VITALS — HEART RATE: 60 BPM | OXYGEN SATURATION: 90 %

## 2025-04-01 DIAGNOSIS — Z95.0 PRESENCE OF CARDIAC PACEMAKER: ICD-10-CM

## 2025-04-01 DIAGNOSIS — I44.2 ATRIOVENTRICULAR BLOCK, COMPLETE: ICD-10-CM

## 2025-04-01 PROCEDURE — 93296 REM INTERROG EVL PM/IDS: CPT | Performed by: INTERNAL MEDICINE

## 2025-04-01 PROCEDURE — 93294 REM INTERROG EVL PM/LDLS PM: CPT | Mod: ,,, | Performed by: INTERNAL MEDICINE

## 2025-04-01 NOTE — PROGRESS NOTES
Outpatient Rehab    Physical Therapy Visit    Patient Name: Adriana Strong  MRN: [id number]  YOB: 1945  Encounter Date: 3/31/2025    Therapy Diagnosis:   Encounter Diagnoses   Name Primary?    Impaired functional mobility and activity tolerance Yes    Weakness     Gait abnormality      Physician: Jos Baptiste MD    Physician Orders: Eval and Treat  Medical Diagnosis: Acute on chronic respiratory failure with hypoxia    Visit # / Visits Authorized:  10 / 20  Insurance Authorization Period: 2025 to 2025  Plan of Care Certification:  2025 to 2025      PT/PTA: PT   Number of PTA visits since last PT visit:0  Time In: 0200   Time Out: 0259  Total Time: 59   Total Billable Time: 59    FOTO:  Intake Score:  %  Survey Score 1:  %  Survey Score 2:  %         Subjective   She is doing okay today. She wants to increase her distance she can walk..  Pain reported as 0/10.      Objective   Vital Signs  Pulse 60   LMP 1973 (LMP Unknown)   SpO2 (!) 90%                         Treatment:  Therapeutic Exercise  TE 1: Nustep 10' at Lv 2  Therapeutic Activity  TA 1: Sit to stands: 3 x 5  TA 3: Sustained upright standin minutes x 3  TA 6: 2 minute vs 6 minute walk education + SPO2 monitoring  Gait Training  GT 1: Gait aorund clinic: 50 feet rolling walker with CGA    Time Entry(in minutes):  Gait Training Time Entry: 10  Therapeutic Activity Time Entry: 39  Therapeutic Exercise Time Entry: 10    Assessment & Plan   Assessment: Patient reports for follow up treatment with no change in complaint of symptoms. Patient tolerated today's session with moderate complaints of pain. Today's treatment included gait and weightbearing interventions. She was able to perform 48 feet during 2 minute walk test today which is an imporvement since was the first day that the test was actually able to be performed. Her oxygen saturation was lower than normal today and she was encouraged to monitor it at  home to make sure it stays in the 90%. They remain limited in endurance, leg strength, and gait. Pateint is progressing well towards their goals.  Evaluation/Treatment Tolerance: Patient tolerated treatment well    Patient will continue to benefit from skilled outpatient physical therapy to address the deficits listed in the problem list box on initial evaluation, provide pt/family education and to maximize pt's level of independence in the home and community environment.     Patient's spiritual, cultural, and educational needs considered and patient agreeable to plan of care and goals.           Plan: Continue to progress plan of care as tolerated.    Goals:   Active       Long Term Goals       Patient will demonstrate a 4+/5 or better on all tested MMT in order to improvr functional mobility and reduce risks for compensation.   (Progressing)       Start:  02/18/25    Expected End:  04/29/25            Patient will retrun back to prior level of function in order to improve quality of life.  (Progressing)       Start:  02/18/25    Expected End:  04/29/25            Patient will stand for 3 minutes straight with no rest break in rolling walker (Progressing)       Start:  02/18/25    Expected End:  04/29/25               Short Term Goals       Patient will demonstrate 100% compliance with their Home Exercise Porgram in order to improve their current level of function.   (Met)       Start:  02/18/25    Expected End:  03/25/25    Resolved:  03/24/25         Patient will perform TUG in 58 seconds with rolling walker in order to improve dynamic balance.   (Progressing)       Start:  02/18/25    Expected End:  03/25/25            Patient will perform 5xSTS test in 35 seconds in order to improve functional lower extremity strength.   (Progressing)       Start:  02/18/25    Expected End:  03/25/25                Rafiq Grijalva, PT

## 2025-04-02 ENCOUNTER — CLINICAL SUPPORT (OUTPATIENT)
Dept: REHABILITATION | Facility: HOSPITAL | Age: 80
End: 2025-04-02
Payer: MEDICARE

## 2025-04-02 DIAGNOSIS — R53.1 WEAKNESS: ICD-10-CM

## 2025-04-02 DIAGNOSIS — R26.9 GAIT ABNORMALITY: ICD-10-CM

## 2025-04-02 DIAGNOSIS — Z74.09 IMPAIRED FUNCTIONAL MOBILITY AND ACTIVITY TOLERANCE: Primary | ICD-10-CM

## 2025-04-02 PROCEDURE — 97110 THERAPEUTIC EXERCISES: CPT | Mod: PO

## 2025-04-02 PROCEDURE — 97116 GAIT TRAINING THERAPY: CPT | Mod: PO

## 2025-04-02 PROCEDURE — 97530 THERAPEUTIC ACTIVITIES: CPT | Mod: PO

## 2025-04-02 NOTE — PROGRESS NOTES
Outpatient Rehab    Physical Therapy Visit    Patient Name: Adriana Strong  MRN: [id number]  YOB: 1945  Encounter Date: 4/2/2025    Therapy Diagnosis:   Encounter Diagnoses   Name Primary?    Impaired functional mobility and activity tolerance Yes    Weakness     Gait abnormality      Physician: Jos Baptiste MD    Physician Orders: Eval and Treat  Medical Diagnosis: Acute on chronic respiratory failure with hypoxia    Visit # / Visits Authorized:  11 / 20  Insurance Authorization Period: 2/18/2025 to 12/31/2025  Plan of Care Certification:  2/18/2025 to 4/29/2025      PT/PTA: PT   Number of PTA visits since last PT visit:0  Time In: 0158   Time Out: 0257  Total Time: 59   Total Billable Time: 59    FOTO:  Intake Score:  %  Survey Score 1:  %  Survey Score 2:  %         Subjective   She cannot recall if her  has been monitoring her O2 levels..  Pain reported as 0/10.      Objective            Treatment:  Therapeutic Exercise  TE 1: Nustep 10' at Lv 2  TE 2: Heel raises at bar with CGA 2 x 10  Therapeutic Activity  TA 1: Sit to stands: 3 x 5  TA 2: mini squats at bar 2 x 10 with CGA  TA 3: Sustained upright standing: 3 minutes x 2  TA 4: step ups: 10x 2 inch step  TA 5: Stair taps: 4 inch step: 10x each leg  Gait Training  GT 1: Gait aorund clinic: 50 feet rolling walker with CGA    Time Entry(in minutes):  Gait Training Time Entry: 10  Therapeutic Activity Time Entry: 35  Therapeutic Exercise Time Entry: 15    Assessment & Plan   Assessment: Patient reports for follow up treatment with increase in complaint of symptoms. Patient tolerated today's session with minimal complaints of shortness of breathe. Today's treatment included strengthening and dynamic mobility interventions in weight bearing. Patient tolerated progressions well and included increased duration of standing.  They remain limited in endurance and indepdnence. Pateint is progressing well towards their  goals.  Evaluation/Treatment Tolerance: Patient tolerated treatment well    Patient will continue to benefit from skilled outpatient physical therapy to address the deficits listed in the problem list box on initial evaluation, provide pt/family education and to maximize pt's level of independence in the home and community environment.     Patient's spiritual, cultural, and educational needs considered and patient agreeable to plan of care and goals.           Plan: Continue to progress plan of care as tolerated.    Goals:   Active       Long Term Goals       Patient will demonstrate a 4+/5 or better on all tested MMT in order to improvr functional mobility and reduce risks for compensation.   (Progressing)       Start:  02/18/25    Expected End:  04/29/25            Patient will retrun back to prior level of function in order to improve quality of life.  (Progressing)       Start:  02/18/25    Expected End:  04/29/25            Patient will stand for 3 minutes straight with no rest break in rolling walker (Progressing)       Start:  02/18/25    Expected End:  04/29/25               Short Term Goals       Patient will demonstrate 100% compliance with their Home Exercise Porgram in order to improve their current level of function.   (Met)       Start:  02/18/25    Expected End:  03/25/25    Resolved:  03/24/25         Patient will perform TUG in 58 seconds with rolling walker in order to improve dynamic balance.   (Progressing)       Start:  02/18/25    Expected End:  03/25/25            Patient will perform 5xSTS test in 35 seconds in order to improve functional lower extremity strength.   (Progressing)       Start:  02/18/25    Expected End:  03/25/25                Rafiq Grijalva PT

## 2025-04-07 ENCOUNTER — CLINICAL SUPPORT (OUTPATIENT)
Dept: REHABILITATION | Facility: HOSPITAL | Age: 80
End: 2025-04-07
Payer: MEDICARE

## 2025-04-07 DIAGNOSIS — Z74.09 IMPAIRED FUNCTIONAL MOBILITY AND ACTIVITY TOLERANCE: Primary | ICD-10-CM

## 2025-04-07 DIAGNOSIS — R26.9 GAIT ABNORMALITY: ICD-10-CM

## 2025-04-07 DIAGNOSIS — R53.1 WEAKNESS: ICD-10-CM

## 2025-04-07 PROCEDURE — 97110 THERAPEUTIC EXERCISES: CPT | Mod: PO

## 2025-04-07 PROCEDURE — 97116 GAIT TRAINING THERAPY: CPT | Mod: PO

## 2025-04-07 PROCEDURE — 97530 THERAPEUTIC ACTIVITIES: CPT | Mod: PO

## 2025-04-07 NOTE — PROGRESS NOTES
Outpatient Rehab    Physical Therapy Visit    Patient Name: Adriana Strong  MRN: [id number]  YOB: 1945  Encounter Date: 4/7/2025    Therapy Diagnosis:   Encounter Diagnoses   Name Primary?    Impaired functional mobility and activity tolerance Yes    Weakness     Gait abnormality      Physician: Jos Baptiste MD    Physician Orders: Eval and Treat  Medical Diagnosis: Acute on chronic respiratory failure with hypoxia    Visit # / Visits Authorized:  12 / 20  Insurance Authorization Period: 2/18/2025 to 12/31/2025  Plan of Care Certification:  2/18/2025 to 4/29/2025     PT/PTA:   0  Number of PTA visits since last PT visit: 0  Time In: 0200   Time Out: 0245  Total Time: 45   Total Billable Time: 45    FOTO:  Intake Score:  %  Survey Score 1:  %  Survey Score 2:  %         Subjective   She is going to do the 6 minute walk test tomorrow..  Pain reported as 0/10.      Objective         Ambulation Assistance Required  Surface With  Assistive Device Without Assistive Device Details   Level Contact guard assist        Uneven Minimal assist       Curb Moderate assist         Stairs Assistance Required   Assistance Level Upper Extremity Support Pattern   Ascending Unable       Descending Unable            Ambulation Details    2 minute walk test: 61 feet with rolling walker    Gait Analysis  Gait Pattern: Antalgic                   Treatment:  Therapeutic Exercise  TE 1: Nustep 10' at Lv 2  Therapeutic Activity  TA 6: 2 minute vs 6 minute walk education + SPO2 monitoring  Gait Training  GT 1: Gait aorund clinic: 61 feet rolling walker with CGA + (30 feet x 3 bouts)    Time Entry(in minutes):  Gait Training Time Entry: 25  Therapeutic Activity Time Entry: 10  Therapeutic Exercise Time Entry: 10    Assessment & Plan   Assessment: Patient reports for follow up treatment with increase in complaint of shortness of breathe symptoms. Patient tolerated today's session with moderate complaints of shortness of  breathe due to interval walking and decreased time on rest breaks Today's treatment included gait and cardiovascualr endurance. Patient tolerated progressions well and included increased gait. Her 2 minute walk test improved 14 feet in 1 week. They remain limited in endurance and indepdnence. Pateint is progressing well towards their goals.  Evaluation/Treatment Tolerance: Patient tolerated treatment well    Patient will continue to benefit from skilled outpatient physical therapy to address the deficits listed in the problem list box on initial evaluation, provide pt/family education and to maximize pt's level of independence in the home and community environment.     Patient's spiritual, cultural, and educational needs considered and patient agreeable to plan of care and goals.           Plan: Continue to progress plan of care as tolerated.    Goals:   Active       Long Term Goals       Patient will demonstrate a 4+/5 or better on all tested MMT in order to improvr functional mobility and reduce risks for compensation.   (Progressing)       Start:  02/18/25    Expected End:  04/29/25            Patient will retrun back to prior level of function in order to improve quality of life.  (Progressing)       Start:  02/18/25    Expected End:  04/29/25            Patient will stand for 3 minutes straight with no rest break in rolling walker (Progressing)       Start:  02/18/25    Expected End:  04/29/25               Short Term Goals       Patient will demonstrate 100% compliance with their Home Exercise Porgram in order to improve their current level of function.   (Met)       Start:  02/18/25    Expected End:  03/25/25    Resolved:  03/24/25         Patient will perform TUG in 58 seconds with rolling walker in order to improve dynamic balance.   (Progressing)       Start:  02/18/25    Expected End:  03/25/25            Patient will perform 5xSTS test in 35 seconds in order to improve functional lower extremity  strength.   (Progressing)       Start:  02/18/25    Expected End:  03/25/25                Rafiq Grijalva PT

## 2025-04-08 ENCOUNTER — HOSPITAL ENCOUNTER (OUTPATIENT)
Dept: PULMONOLOGY | Facility: HOSPITAL | Age: 80
Discharge: HOME | End: 2025-04-08
Attending: INTERNAL MEDICINE
Payer: MEDICARE

## 2025-04-08 DIAGNOSIS — J96.21 ACUTE ON CHRONIC RESPIRATORY FAILURE WITH HYPOXIA: ICD-10-CM

## 2025-04-08 DIAGNOSIS — J45.909 ASTHMA, UNSPECIFIED ASTHMA SEVERITY, UNSPECIFIED WHETHER COMPLICATED, UNSPECIFIED WHETHER PERSISTENT: ICD-10-CM

## 2025-04-08 LAB
FEF 25 75 CHG: 9.6 %
FEF 25 75 LLN: 0.77
FEF 25 75 POST REF: 22 %
FEF 25 75 PRE REF: 20.1 %
FEF 25 75 REF: 2.16
FET100 CHG: -32.1 %
FEV1 CHG: -1.1 %
FEV1 FVC CHG: 2.4 %
FEV1 FVC LLN: 62
FEV1 FVC POST REF: 96.7 %
FEV1 FVC PRE REF: 94.5 %
FEV1 FVC REF: 77
FEV1 LLN: 1.3
FEV1 POST REF: 36.8 %
FEV1 PRE REF: 37.3 %
FEV1 REF: 1.88
FVC CHG: -3.4 %
FVC LLN: 1.72
FVC POST REF: 37.5 %
FVC PRE REF: 38.9 %
FVC REF: 2.48
PEF CHG: -18.1 %
PEF LLN: 2.89
PEF POST REF: 58.6 %
PEF PRE REF: 71.5 %
PEF REF: 4.6
POST FEF 25 75: 0.48 L/S (ref 0.77–3.56)
POST FET 100: 5.6 SEC
POST FEV1 FVC: 74.27 % (ref 61.82–89.79)
POST FEV1: 0.69 L (ref 1.3–2.43)
POST FVC: 0.93 L (ref 1.72–3.28)
POST PEF: 2.69 L/S (ref 2.89–6.31)
PRE FEF 25 75: 0.44 L/S (ref 0.77–3.56)
PRE FET 100: 8.25 SEC
PRE FEV1 FVC: 72.53 % (ref 61.82–89.79)
PRE FEV1: 0.7 L (ref 1.3–2.43)
PRE FVC: 0.96 L (ref 1.72–3.28)
PRE PEF: 3.29 L/S (ref 2.89–6.31)

## 2025-04-08 PROCEDURE — 94640 AIRWAY INHALATION TREATMENT: CPT

## 2025-04-08 PROCEDURE — 94010 BREATHING CAPACITY TEST: CPT

## 2025-04-08 NOTE — PROCEDURES
We attempted to do 6 minute walk. Her saturation level was 85% on room air. We placed her on 2lnc and her saturation was only 87% , We went up to 3LNC and her saturation edwin to 91%. She stood from her wheelchair and attempted to walk but was unable to walk more than 30 feet when she needed to sit because her legs were to unsteady. Her saturation stayed at 90% on 3LNC and her HR was 60

## 2025-04-09 ENCOUNTER — CLINICAL SUPPORT (OUTPATIENT)
Dept: REHABILITATION | Facility: HOSPITAL | Age: 80
End: 2025-04-09
Payer: MEDICARE

## 2025-04-09 DIAGNOSIS — Z74.09 IMPAIRED FUNCTIONAL MOBILITY AND ACTIVITY TOLERANCE: Primary | ICD-10-CM

## 2025-04-09 DIAGNOSIS — R53.1 WEAKNESS: ICD-10-CM

## 2025-04-09 DIAGNOSIS — R26.9 GAIT ABNORMALITY: ICD-10-CM

## 2025-04-09 PROCEDURE — 97116 GAIT TRAINING THERAPY: CPT | Mod: PO

## 2025-04-09 PROCEDURE — 97530 THERAPEUTIC ACTIVITIES: CPT | Mod: PO

## 2025-04-09 PROCEDURE — 97110 THERAPEUTIC EXERCISES: CPT | Mod: PO

## 2025-04-09 NOTE — PROGRESS NOTES
Outpatient Rehab    Physical Therapy Visit    Patient Name: Adriana Strong  MRN: [id number]  YOB: 1945  Encounter Date: 4/9/2025    Therapy Diagnosis:   Encounter Diagnoses   Name Primary?    Impaired functional mobility and activity tolerance Yes    Weakness     Gait abnormality      Physician: Jos Baptiste MD    Physician Orders: Eval and Treat  Medical Diagnosis: Acute on chronic respiratory failure with hypoxia    Visit # / Visits Authorized:  13 / 20  Insurance Authorization Period: 2/18/2025 to 12/31/2025  Plan of Care Certification:  2/18/2025 to 4/29/2025      PT/PTA:   0  Number of PTA visits since last PT visit: 0  Time In: 0150   Time Out: 0255  Total Time: 65   Total Billable Time: 65    FOTO:  Intake Score:  %  Survey Score 1:  %  Survey Score 2:  %         Subjective   She is going back to see the cardiolgist next month and she would like to continue therapy..  Family / care giver present for this visit:   Pain reported as 0/10.      Objective            Treatment:  Therapeutic Exercise  TE 1: Nustep 10' at Lv 2  TE 2: Heel raises at bar with CGA 2 x 10  Therapeutic Activity  TA 1: Sit to stands: 3 x 5  TA 3: Sustained upright standing: 3 minutes x 2  TA 4: step ups: 10x 2 inch step  TA 5: Stair taps: 4 inch step: 10x each leg (2 sets)  Gait Training  GT 1: Gait aorund clinic: rolling walker and CGA (30 feet x 3 bouts)    Time Entry(in minutes):  Gait Training Time Entry: 25  Therapeutic Activity Time Entry: 25  Therapeutic Exercise Time Entry: 15    Assessment & Plan   Assessment: Patient reports for follow up treatment with decrease in complaint of symptoms with new oxygen tank that reduces her chest tightness. Patient tolerated today's session with minimal complaints. Today's treatment included gait, and weight bearing interventions. Patient tolerated progressions well and included increased reps for stair taps.  They remain limited in cardiovascualr endurance. Pateint is  progressing well towards their goals.  Evaluation/Treatment Tolerance: Patient tolerated treatment well    Patient will continue to benefit from skilled outpatient physical therapy to address the deficits listed in the problem list box on initial evaluation, provide pt/family education and to maximize pt's level of independence in the home and community environment.     Patient's spiritual, cultural, and educational needs considered and patient agreeable to plan of care and goals.           Plan: Continue to progress plan of care as tolerated.    Goals:   Active       Long Term Goals       Patient will demonstrate a 4+/5 or better on all tested MMT in order to improvr functional mobility and reduce risks for compensation.   (Progressing)       Start:  02/18/25    Expected End:  04/29/25            Patient will retrun back to prior level of function in order to improve quality of life.  (Progressing)       Start:  02/18/25    Expected End:  04/29/25            Patient will stand for 3 minutes straight with no rest break in rolling walker (Progressing)       Start:  02/18/25    Expected End:  04/29/25               Short Term Goals       Patient will demonstrate 100% compliance with their Home Exercise Porgram in order to improve their current level of function.   (Met)       Start:  02/18/25    Expected End:  03/25/25    Resolved:  03/24/25         Patient will perform TUG in 58 seconds with rolling walker in order to improve dynamic balance.   (Progressing)       Start:  02/18/25    Expected End:  03/25/25            Patient will perform 5xSTS test in 35 seconds in order to improve functional lower extremity strength.   (Progressing)       Start:  02/18/25    Expected End:  03/25/25                Rafiq Grijalva, PT

## 2025-04-11 ENCOUNTER — TELEPHONE (OUTPATIENT)
Dept: CARDIOLOGY | Facility: CLINIC | Age: 80
End: 2025-04-11
Payer: MEDICARE

## 2025-04-11 NOTE — TELEPHONE ENCOUNTER
Spoke to daughter.  Will confirm with Dr. Baptiste and let her know if CTA is needed with visit.     ----- Message from Reyna sent at 4/11/2025 10:26 AM CDT -----  Regarding: Medical question on testing  Hello Team,Can you call daughter on medical questions on testing.   Tammy Justo @ [phone] you,Reyna

## 2025-04-14 ENCOUNTER — CLINICAL SUPPORT (OUTPATIENT)
Dept: REHABILITATION | Facility: HOSPITAL | Age: 80
End: 2025-04-14
Payer: MEDICARE

## 2025-04-14 DIAGNOSIS — R53.1 WEAKNESS: ICD-10-CM

## 2025-04-14 DIAGNOSIS — R26.9 GAIT ABNORMALITY: ICD-10-CM

## 2025-04-14 DIAGNOSIS — Z74.09 IMPAIRED FUNCTIONAL MOBILITY AND ACTIVITY TOLERANCE: Primary | ICD-10-CM

## 2025-04-14 PROCEDURE — 97110 THERAPEUTIC EXERCISES: CPT | Mod: PO,CQ

## 2025-04-14 PROCEDURE — 97530 THERAPEUTIC ACTIVITIES: CPT | Mod: PO,CQ

## 2025-04-14 NOTE — PROGRESS NOTES
"  Outpatient Rehab    Physical Therapy Visit    Patient Name: Adriana Strong  MRN: [id number]  YOB: 1945  Encounter Date: 4/14/2025    Therapy Diagnosis:   Encounter Diagnoses   Name Primary?    Impaired functional mobility and activity tolerance Yes    Weakness     Gait abnormality      Physician: Jos Baptiste MD    Physician Orders: Eval and Treat  Medical Diagnosis: Acute on chronic respiratory failure with hypoxia    Visit # / Visits Authorized:  14 / 20  Insurance Authorization Period: 2/18/2025 to 12/31/2025  Date of Evaluation: 2/18/25  Plan of Care Certification:  2/18/2025 to 4/29/2025      PT/PTA: PTA   Number of PTA visits since last PT visit:1  Time In: 1400   Time Out: 1456  Total Time: 56   Total Billable Time: 56    FOTO:  Intake Score:  %  Survey Score 1:  %  Survey Score 2:  %         Subjective   Pt states that she was feeling unwell over the weekend and feels like her legs want to give out on her..         Objective            Treatment:  Therapeutic Exercise  TE 1: Nustep 8' at Lv 2- 8' only due to onset of chest tightness  TE 2: Heel raises at bar with CGA 2 x 10  TE 3: biceps curls: 2 x 10 2# weights- NP  Therapeutic Activity  TA 1: Sit to stands: 3 x 5  TA 2: mini squats at bar 2 x 10 with CGA  TA 3: Sustained upright standing: 3 minutes x 2- only 1' at a time due to pt fatigue    Time Entry(in minutes):  Therapeutic Activity Time Entry: 26  Therapeutic Exercise Time Entry: 30    Assessment & Plan   Assessment: Pt tolerated therapy session fairly w/ notable weakness in radha LE greater than previous therapy sessions. Pt unable to stand for more than 1 minute at a time w/ complaints of slight chest pain and stating it "feels like her knees are going to buckle". Standing exercises performed at bar w/ constant HHA for safety, and pt unable to bring knees into full EXT due to muscle weakness. O2 levels checked throughout therapy session w/ stats never falling below 97%. Multiple " rest breaks taken to reduce fatigue levels and for pt to keep O2 levels high. Held gait training on this date due to pt fatigue and will re-introduce next session if able.  Evaluation/Treatment Tolerance: Patient limited by fatigue    Patient will continue to benefit from skilled outpatient physical therapy to address the deficits listed in the problem list box on initial evaluation, provide pt/family education and to maximize pt's level of independence in the home and community environment.     Patient's spiritual, cultural, and educational needs considered and patient agreeable to plan of care and goals.           Plan: Continue to progress plan of care as tolerated.    Goals:   Active       Long Term Goals       Patient will demonstrate a 4+/5 or better on all tested MMT in order to improvr functional mobility and reduce risks for compensation.   (Progressing)       Start:  02/18/25    Expected End:  04/29/25            Patient will retrun back to prior level of function in order to improve quality of life.  (Progressing)       Start:  02/18/25    Expected End:  04/29/25            Patient will stand for 3 minutes straight with no rest break in rolling walker (Progressing)       Start:  02/18/25    Expected End:  04/29/25               Short Term Goals       Patient will demonstrate 100% compliance with their Home Exercise Porgram in order to improve their current level of function.   (Met)       Start:  02/18/25    Expected End:  03/25/25    Resolved:  03/24/25         Patient will perform TUG in 58 seconds with rolling walker in order to improve dynamic balance.   (Progressing)       Start:  02/18/25    Expected End:  03/25/25            Patient will perform 5xSTS test in 35 seconds in order to improve functional lower extremity strength.   (Progressing)       Start:  02/18/25    Expected End:  03/25/25                Rafiq Verdugo PTA

## 2025-04-16 ENCOUNTER — CLINICAL SUPPORT (OUTPATIENT)
Dept: REHABILITATION | Facility: HOSPITAL | Age: 80
End: 2025-04-16
Payer: MEDICARE

## 2025-04-16 DIAGNOSIS — R53.1 WEAKNESS: ICD-10-CM

## 2025-04-16 DIAGNOSIS — R26.9 GAIT ABNORMALITY: ICD-10-CM

## 2025-04-16 DIAGNOSIS — Z74.09 IMPAIRED FUNCTIONAL MOBILITY AND ACTIVITY TOLERANCE: Primary | ICD-10-CM

## 2025-04-16 PROCEDURE — 97530 THERAPEUTIC ACTIVITIES: CPT | Mod: PO

## 2025-04-16 PROCEDURE — 97110 THERAPEUTIC EXERCISES: CPT | Mod: PO

## 2025-04-16 PROCEDURE — 97116 GAIT TRAINING THERAPY: CPT | Mod: PO

## 2025-04-16 NOTE — PROGRESS NOTES
Outpatient Rehab    Physical Therapy Visit    Patient Name: Adriana Strong  MRN: [id number]  YOB: 1945  Encounter Date: 4/16/2025    Therapy Diagnosis:   Encounter Diagnoses   Name Primary?    Impaired functional mobility and activity tolerance Yes    Weakness     Gait abnormality      Physician: Jos Baptiste MD    Physician Orders: Eval and Treat  Medical Diagnosis: Acute on chronic respiratory failure with hypoxia    Visit # / Visits Authorized:  15 / 20  Insurance Authorization Period: 2/18/2025 to 12/31/2025  Plan of Care Certification:  2/18/2025 to 4/29/2025      PT/PTA:   0  Number of PTA visits since last PT visit: 0  Time In: 0200   Time Out: 0300  Total Time: 60   Total Billable Time: 60    FOTO:  Intake Score:  %  Survey Score 1:  %  Survey Score 2:  %         Subjective   She is feeling better than last session on Monday..  Pain reported as 0/10.      Objective            Treatment:  Therapeutic Exercise  TE 1: Nustep 10' at Lv 2  TE 2: Heel raises at bar with CGA 2 x 10  Therapeutic Activity  TA 1: Sit to stands: 3 x 5  TA 2: mini squats at bar 2 x 10 with CGA  TA 3: Sustained standing with ARNOLD, basketball, and cone stacking: 10 minutes total  TA 4: step ups: 8x, 4 inch step  TA 5: Stair taps: 4 inch step: 10x each leg (2 sets)  Gait Training  GT 1: Gait [facility]: rolling walker and CGA 40 feet    Time Entry(in minutes):  Gait Training Time Entry: 8  Therapeutic Activity Time Entry: 37  Therapeutic Exercise Time Entry: 15    Assessment & Plan   Assessment: Patient reports for follow up treatment with decreased in complaint of symptoms. Patient tolerated today's session with minimal complaints of fatigue. Today's treatment included a couple of new interventions includeing basketball toss and arnold playing during standing to improve tolernence with standing and improve sustained standing time. They remain limited in . Pateint is progressing well towards their  goals.  Evaluation/Treatment Tolerance: Patient tolerated treatment well    Patient will continue to benefit from skilled outpatient physical therapy to address the deficits listed in the problem list box on initial evaluation, provide pt/family education and to maximize pt's level of independence in the home and community environment.     Patient's spiritual, cultural, and educational needs considered and patient agreeable to plan of care and goals.           Plan: Continue to progress plan of care as tolerated.    Goals:   Active       Long Term Goals       Patient will demonstrate a 4+/5 or better on all tested MMT in order to improvr functional mobility and reduce risks for compensation.   (Progressing)       Start:  02/18/25    Expected End:  04/29/25            Patient will retrun back to prior level of function in order to improve quality of life.  (Progressing)       Start:  02/18/25    Expected End:  04/29/25            Patient will stand for 3 minutes straight with no rest break in rolling walker (Progressing)       Start:  02/18/25    Expected End:  04/29/25               Short Term Goals       Patient will demonstrate 100% compliance with their Home Exercise Porgram in order to improve their current level of function.   (Met)       Start:  02/18/25    Expected End:  03/25/25    Resolved:  03/24/25         Patient will perform TUG in 58 seconds with rolling walker in order to improve dynamic balance.   (Progressing)       Start:  02/18/25    Expected End:  03/25/25            Patient will perform 5xSTS test in 35 seconds in order to improve functional lower extremity strength.   (Progressing)       Start:  02/18/25    Expected End:  03/25/25                Rafiq Grijalva PT

## 2025-04-23 ENCOUNTER — CLINICAL SUPPORT (OUTPATIENT)
Dept: REHABILITATION | Facility: HOSPITAL | Age: 80
End: 2025-04-23
Payer: MEDICARE

## 2025-04-23 DIAGNOSIS — R53.1 WEAKNESS: ICD-10-CM

## 2025-04-23 DIAGNOSIS — R26.9 GAIT ABNORMALITY: ICD-10-CM

## 2025-04-23 DIAGNOSIS — Z74.09 IMPAIRED FUNCTIONAL MOBILITY AND ACTIVITY TOLERANCE: Primary | ICD-10-CM

## 2025-04-23 PROCEDURE — 97530 THERAPEUTIC ACTIVITIES: CPT | Mod: PO

## 2025-04-23 PROCEDURE — 97116 GAIT TRAINING THERAPY: CPT | Mod: PO

## 2025-04-23 PROCEDURE — 97110 THERAPEUTIC EXERCISES: CPT | Mod: PO

## 2025-04-24 NOTE — PROGRESS NOTES
Outpatient Rehab    Physical Therapy Progress Note : Updated Plan of Care    Patient Name: Adriana Strong  MRN: [id number]  YOB: 1945  Encounter Date: 2025    Therapy Diagnosis:   Encounter Diagnoses   Name Primary?    Impaired functional mobility and activity tolerance Yes    Weakness     Gait abnormality      Physician: Jos Baptiste MD    Physician Orders: Eval and Treat  Medical Diagnosis: Acute on chronic respiratory failure with hypoxia    Visit # / Visits Authorized:    Insurance Authorization Period: 2025 to 2025  Plan of Care Certification:   2025 - 2025     PT/PTA:     Number of PTA visits since last PT visit:   Time In: 0200   Time Out: 0300  Total Time: 60   Total Billable Time: 60    FOTO:  Intake Score:  %  Survey Score 1:  %  Survey Score 2:  %         Subjective   She is feeling tired today..  Pain reported as 0/10.      Objective         Fall Risk  Functional mobility test results suggest the patient is: At Risk for Falls  Timed Up & Go (TUG)  Time: 50 seconds  Observations: Short strides  An older adult who takes >=12 seconds to complete the TUG is at risk for falling.       Sit to Stand Testing  The patient completed 5 sit to stand transfers in 37 sec.            Squat Testing  The patient completed 10 squat repetitions.  Squat Upper Extremity Support: Yes          Gait Analysis  Walking Speed: Decreased            Gait Analysis Details  150 total feet with rolling walker and CGA.          Treatment:  Therapeutic Exercise  TE 1: Nustep 10' at Lv 2  TE 3: biceps curls: 3 x 10 2# weights  Therapeutic Activity  TA 3: Sustained standing and throwing at cones: 2 rounds until completition  TA 7: Sustained standing with basketball toss: 20 throws  TA 8: sustained standing with cup stacking and color coordinatin round of 12 cones  Gait Training  GT 1: Gait with rolling walker and CGA: 25 feet x 6 rounds    Time Entry(in minutes):  Gait Training Time  Entry: 15  Therapeutic Activity Time Entry: 33  Therapeutic Exercise Time Entry: 12    Assessment & Plan      Patient reports for follow up treatment with slight increase in complaint of symptoms. Patient tolerated today's session with moderate complaints of chest pain. Today's treatment included strengthening and weightbearing interventions with increased gait. Patient tolerated progressions well and included increased distance for gait which included a personal best of 150 feet. They remain limited in cardiovacular endurance, musclar endurance, and functional mobility. Pateint is progressing well towards their goals.   Patient will continue to benefit from skilled outpatient physical therapy to address the deficits listed in the problem list box on initial evaluation, provide pt/family education and to maximize pt's level of independence in the home and community environment.     Patient's spiritual, cultural, and educational needs considered and patient agreeable to plan of care and goals.       Continue to progress plan of care as tolerated.     Goals:   Active       Long Term Goals       Patient will demonstrate a 4+/5 or better on all tested MMT in order to improvr functional mobility and reduce risks for compensation.   (Progressing)       Start:  02/18/25    Expected End:  04/29/25            Patient will retrun back to prior level of function in order to improve quality of life.  (Progressing)       Start:  02/18/25    Expected End:  04/29/25            Patient will stand for 3 minutes straight with no rest break in rolling walker (Progressing)       Start:  02/18/25    Expected End:  04/29/25               Short Term Goals       Patient will demonstrate 100% compliance with their Home Exercise Porgram in order to improve their current level of function.   (Met)       Start:  02/18/25    Expected End:  03/25/25    Resolved:  03/24/25         Patient will perform TUG in 58 seconds with rolling walker in  order to improve dynamic balance.   (Met)       Start:  02/18/25    Expected End:  03/25/25    Resolved:  04/24/25         Patient will perform 5xSTS test in 35 seconds in order to improve functional lower extremity strength.   (Progressing)       Start:  02/18/25    Expected End:  03/25/25                Rafiq Grijalva PT

## 2025-04-25 ENCOUNTER — OFFICE VISIT (OUTPATIENT)
Dept: CARDIOLOGY | Facility: CLINIC | Age: 80
End: 2025-04-25
Payer: MEDICARE

## 2025-04-25 ENCOUNTER — HOSPITAL ENCOUNTER (OUTPATIENT)
Dept: CARDIOLOGY | Facility: HOSPITAL | Age: 80
Discharge: HOME | End: 2025-04-25
Attending: INTERNAL MEDICINE
Payer: MEDICARE

## 2025-04-25 VITALS
WEIGHT: 186 LBS | HEART RATE: 59 BPM | HEIGHT: 64 IN | DIASTOLIC BLOOD PRESSURE: 49 MMHG | SYSTOLIC BLOOD PRESSURE: 104 MMHG | BODY MASS INDEX: 31.76 KG/M2

## 2025-04-25 VITALS
HEIGHT: 64 IN | WEIGHT: 186.06 LBS | SYSTOLIC BLOOD PRESSURE: 134 MMHG | DIASTOLIC BLOOD PRESSURE: 60 MMHG | HEART RATE: 60 BPM | BODY MASS INDEX: 31.77 KG/M2

## 2025-04-25 DIAGNOSIS — Z95.3 S/P TAVR (TRANSCATHETER AORTIC VALVE REPLACEMENT): ICD-10-CM

## 2025-04-25 DIAGNOSIS — Z95.3 S/P TAVR (TRANSCATHETER AORTIC VALVE REPLACEMENT): Primary | ICD-10-CM

## 2025-04-25 DIAGNOSIS — I48.21 PERMANENT ATRIAL FIBRILLATION: ICD-10-CM

## 2025-04-25 DIAGNOSIS — I35.0 SEVERE AORTIC STENOSIS: Primary | ICD-10-CM

## 2025-04-25 LAB
ASCENDING AORTA: 3 CM
AV AREA BY CONTINUOUS VTI: 0.9 CM2
AV INDEX (PROSTH): 0.18
AV LVOT MEAN GRADIENT: 3 MMHG
AV LVOT PEAK GRADIENT: 4 MMHG
AV MEAN GRADIENT: 32 MMHG
AV PEAK GRADIENT: 38 MMHG
AV REGURGITATION PRESSURE HALF TIME: 506 MS
AV VALVE AREA BY VELOCITY RATIO: 0.6 CM²
AV VALVE AREA: 0.6 CM²
AV VELOCITY RATIO: 0.16
BSA FOR ECHO PROCEDURE: 1.95 M2
CV ECHO LV RWT: 0.33 CM
DOP CALC AO PEAK VEL: 3.1 M/S
DOP CALC AO VTI: 88 CM
DOP CALC LVOT AREA: 3.5 CM2
DOP CALC LVOT DIAMETER: 2.1 CM
DOP CALC LVOT PEAK VEL: 0.5 M/S
DOP CALC LVOT STROKE VOLUME: 53.3 CM3
DOP CALCLVOT PEAK VEL VTI: 15.4 CM
E WAVE DECELERATION TIME: 154 MS
E/A RATIO: 2.8
E/E' RATIO: 25 M/S
ECHO EF ESTIMATED: 46 %
ECHO LV POSTERIOR WALL: 0.8 CM (ref 0.6–1.1)
FRACTIONAL SHORTENING: 22.9 % (ref 28–44)
INTERVENTRICULAR SEPTUM: 1 CM (ref 0.6–1.1)
IVC DIAMETER: 2.5 CM
LA MAJOR: 7.1 CM
LA MINOR: 7 CM
LA WIDTH: 4.3 CM
LEFT ATRIUM SIZE: 4.9 CM
LEFT ATRIUM VOLUME INDEX MOD: 40 ML/M2
LEFT ATRIUM VOLUME INDEX: 66 ML/M2
LEFT ATRIUM VOLUME MOD: 76 ML
LEFT ATRIUM VOLUME: 126 CM3
LEFT INTERNAL DIMENSION IN SYSTOLE: 3.7 CM (ref 2.1–4)
LEFT VENTRICLE DIASTOLIC VOLUME INDEX: 57.37 ML/M2
LEFT VENTRICLE DIASTOLIC VOLUME: 109 ML
LEFT VENTRICLE MASS INDEX: 77.8 G/M2
LEFT VENTRICLE SYSTOLIC VOLUME INDEX: 31.1 ML/M2
LEFT VENTRICLE SYSTOLIC VOLUME: 59 ML
LEFT VENTRICULAR INTERNAL DIMENSION IN DIASTOLE: 4.8 CM (ref 3.5–6)
LEFT VENTRICULAR MASS: 147.8 G
LV LATERAL E/E' RATIO: 22.4
LV SEPTAL E/E' RATIO: 28
MV PEAK A VEL: 0.4 M/S
MV PEAK E VEL: 1.12 M/S
OHS CV RV/LV RATIO: 0.92 CM
PISA MRMAX VEL: 4.69 M/S
PISA TR MAX VEL: 3.6 M/S
RA MAJOR: 6.25 CM
RA PRESSURE ESTIMATED: 8 MMHG
RA WIDTH: 4.01 CM
RIGHT ATRIAL AREA: 18.8 CM2
RIGHT VENTRICLE DIASTOLIC BASEL DIMENSION: 4.4 CM
RV TB RVSP: 12 MMHG
RV TISSUE DOPPLER FREE WALL SYSTOLIC VELOCITY 1 (APICAL 4 CHAMBER VIEW): 4.36 CM/S
TDI LATERAL: 0.05 M/S
TDI SEPTAL: 0.04 M/S
TDI: 0.05 M/S
TRICUSPID ANNULAR PLANE SYSTOLIC EXCURSION: 1.1 CM
TV PEAK GRADIENT: 51 MMHG
TV REST PULMONARY ARTERY PRESSURE: 60 MMHG
Z-SCORE OF LEFT VENTRICULAR DIMENSION IN END DIASTOLE: -1.05
Z-SCORE OF LEFT VENTRICULAR DIMENSION IN END SYSTOLE: 0.94

## 2025-04-25 PROCEDURE — 93306 TTE W/DOPPLER COMPLETE: CPT

## 2025-04-25 PROCEDURE — 99999 PR PBB SHADOW E&M-EST. PATIENT-LVL V: CPT | Mod: PBBFAC,,, | Performed by: INTERNAL MEDICINE

## 2025-04-25 PROCEDURE — 93306 TTE W/DOPPLER COMPLETE: CPT | Mod: 26,,, | Performed by: INTERNAL MEDICINE

## 2025-04-25 NOTE — ASSESSMENT & PLAN NOTE
- Previous echo 11/2024 shows Paradoxical low-flow low gradient severe AS.  MISTY 0.8, AVAi 0.4, SVI 25. NYHA III-IV symptoms.    - At least 2 months of rehab have been completed.  Endorses some improvement in independence to ADLs.  Still needs walker for assistance due to chronic orthopedics/back issues.  - Plan to get repeat TTE today.  Will follow-up pending results of new echo and severity of valvular dysfunction.  - Patient non-operable in 2019. If plans to move, would need repeat CTA and to discuss patient at multidisciplinary meeting.

## 2025-04-25 NOTE — PROGRESS NOTES
Interventional Cardiology Clinic Note    Last Clinic Visit:  2/4/2025    HPI:     Adriana Strong is a 80 y.o. female who presents for follow-up.    Patient has history of HTN, HLD, DM, HFpEF, chronically on 2L O2, and AF on Eliquis, and s/p TAVR in 2019 with a 26 -1.5cc S3 valve 11/19/19 with Dr. Capone with resultant PPM.  She had PPM upgrade to CRT device 5/29/2024 and initially felt better however in the last year developed progressive worsening dyspnea on exertion.  She underwent multiple testing and ultimately had a PEDRO that revealed significant valvular obstruction with possible pannus formation and referred to clinic for evaluation bioprosthetic AV dysfunction. She was previously seen in clinic in February, and noted worsening weakness and balance issues requiring the use of a walker at home.  Decision was made for patient to have PT for prehab.    Patient has since completed prehab, endorses some improvement in independence to ADLs.  Still uses walker for assistance as she has chronic back issues.  Feels that shortness of breath is worsening from previous visit.  She has remained euvolemic.  She has seen pulmonology for mild asthma/COPD who believed patient to have minimal lung disease and symptoms were more so cardiac related.  She is a never smoker.  She was nonoperable per Dr. Kilpatrick in 2019 prior to original TAVR.      PEDRO 12/11/2024:   PEDRO to evaluate for prosthetic aortic valve stenosis. There is degeneration of the prosthetic valve leaflets that appears severely restricted likely secondary to pannus formation. No thrombus identified. Additionally, LV global longitudinal strain is suspicious for infiltrative cardiomyopathy.     TTE 11/14/2024:  Aortic Valve: There is a transcatheter valve replacement in the aortic position. It is reported to be a 26 mm Phan Suzi valve. Mildly calcified cusps. Aortic valve area by VTI is 0.8 cm2. Aortic valve peak velocity is 2.9 m/s. Mean gradient is 21.8  mmHg. The dimensionless index is 0.25.  msand AcT/ET ratio is 0.36. Findings and trend from prior echocardiography evaluation is suggestive of prosthetic valve stenosis. Clinical correlation is required.    ROS:      ROS    PMH:     Past Medical History:   Diagnosis Date    Acute on chronic diastolic (congestive) heart failure 11/20/2019    Anticoagulant long-term use     on Plavix since May 2015    Anxiety     Arthritis     Asthma     Atrial fibrillation     Atrial fibrillation with RVR 10/19/2020    Cataracts, bilateral     Chest pain, atypical     Chronic atrial fibrillation with rapid ventricular response 06/23/2017    Colon polyp     Coronary artery disease     Diabetes mellitus     Dry eyes     Esophageal erosions     General anesthetics causing adverse effect in therapeutic use     GERD (gastroesophageal reflux disease)     Heart murmur     Hemorrhoid     High cholesterol     Hypertension     Irritable bowel syndrome     Paroxysmal atrial fibrillation 10/30/2020    Persistent atrial fibrillation 02/10/2020    Shingles 2015    Stenosis of aortic and mitral valves     Stroke     TIA (transient ischemic attack)     Use of cane as ambulatory aid      Past Surgical History:   Procedure Laterality Date    ABDOMINAL SURGERY      stents to SMA    angiocele      2007, 2014    AORTIC VALVE REPLACEMENT N/A 11/19/2019    Procedure: Replacement-valve-aortic;  Surgeon: Jack Capone MD;  Location: [facility] CATH LAB;  Service: Cardiology;  Laterality: N/A;    BREAST SURGERY      left--- a lump--- no cancer    CARDIAC VALVE SURGERY      COLONOSCOPY  2014    COLONOSCOPY N/A 3/14/2018    Procedure: COLONOSCOPY;  Surgeon: Efren Kemp MD;  Location: [facility] ENDO ([address] [place] [address]);  Service: Endoscopy;  Laterality: N/A;  ok to hold Plavix 5 days prior to procedure per Dr RESHMA Hernandez     ok per Dr Kemp to hold Eliquis 2 days prior to procedure (see telephone encounter dated-2/7/18)    CORONARY ANGIOGRAPHY N/A 2/15/2024    Procedure:  ANGIOGRAM, CORONARY ARTERY;  Surgeon: Jos Baptiste MD;  Location: [facility] CATH LAB;  Service: Cardiology;  Laterality: N/A;    ECHOCARDIOGRAM,TRANSESOPHAGEAL N/A 12/11/2024    Procedure: Transesophageal echo (PEDRO) intra-procedure log documentation;  Surgeon: Geno Salcedo Diagnostic;  Location: [facility] EP LAB;  Service: Cardiology;  Laterality: N/A;    HERNIA REPAIR      HYSTERECTOMY  partial    1982 partial hysterectomy    IMPLANTATION OF BIVENTRICULAR PERMANENT PACEMAKER AS UPGRADE TO EXISTING PACEMAKER Left 5/29/2024    Procedure: Biventricular pacemaker upgrade;  Surgeon: Chencho Moeller MD;  Location: [facility] EP LAB;  Service: Cardiology;  Laterality: Left;  CHF, AF,  CRTP ugrd, BIO, MAC, HI, 3prep ** BIO dcPPM in situ/ Contrast Prep**    LEFT HEART CATHETERIZATION Right 11/5/2019    Procedure: Left heart cath;  Surgeon: Jack Capone MD;  Location: [facility] CATH LAB;  Service: Cardiology;  Laterality: Right;    left nasal polyp      left neck lymph node      nose polyp      PHLEBOGRAPHY Left 5/1/2024    Procedure: Venogram;  Surgeon: Chencho Moeller MD;  Location: [facility] EP LAB;  Service: Cardiology;  Laterality: Left;  HF, Venogram ( Lt arm) , HI, 3 prep ** BIO dcPPM in situ/ Contrast Prep**    right hip fatty mass tissue      stent to small intestine      SUPERIOR VENA CAVA ANGIOPLASTY / STENTING      TONSILLECTOMY      TOTAL ABDOMINAL HYSTERECTOMY      2014    TOTAL KNEE ARTHROPLASTY Bilateral     TREATMENT OF CARDIAC ARRHYTHMIA N/A 2/10/2020    Procedure: CARDIOVERSION;  Surgeon: Jayy Parrish MD;  Location: [facility] EP LAB;  Service: Cardiology;  Laterality: N/A;  AF, PEDRO, DCCV, MAC, DM, 3 Prep    TREATMENT OF CARDIAC ARRHYTHMIA N/A 5/15/2020    Procedure: CARDIOVERSION;  Surgeon: Hany Bee MD;  Location: [facility] EP LAB;  Service: Cardiology;  Laterality: N/A;  AF, PEDRO, DCCV, MAC, DM, 3 Prep    UPPER GASTROINTESTINAL ENDOSCOPY  2014     Allergies:     Review of patient's allergies indicates:   Allergen  "Reactions    Celebrex [celecoxib] Shortness Of Breath    Ciprofloxacin Swelling     lip    Dicyclomine Hives    Adhesive Dermatitis    Avelox [moxifloxacin] Swelling    Cilostazol Other (See Comments)     Elevates blood pressure    Eryc [erythromycin] Other (See Comments)     unknown    Iodine and iodide containing products Hives    Keflex [cephalexin] Hives     Tolerated ceftriaxone (11/11/2024)    Meclomen      rashes    Meloxicam      Ear ringing    Metoclopramide Other (See Comments)     High blood pressure    Sulfa (sulfonamide antibiotics) Itching     Medications:   Medications Ordered Prior to Encounter[1]  Social History:     Social History     Tobacco Use    Smoking status: Never    Smokeless tobacco: Never   Substance Use Topics    Alcohol use: No     Family History:     Family History   Problem Relation Name Age of Onset    Heart attack Mother      Stroke Father      Heart failure Brother      Colon cancer Neg Hx      Inflammatory bowel disease Neg Hx       Physical Exam:   BP (!) 104/49 (BP Location: Right arm, Patient Position: Sitting)   Pulse (!) 59   Ht 5' 4" (1.626 m)   Wt 84.4 kg (186 lb)   LMP 01/21/1973 (LMP Unknown)   BMI 31.93 kg/m²        Physical Exam  Vitals and nursing note reviewed.   Constitutional:       General: She is not in acute distress.     Appearance: Normal appearance. She is not ill-appearing or toxic-appearing.   HENT:      Head: Normocephalic and atraumatic.   Eyes:      Pupils: Pupils are equal, round, and reactive to light.   Cardiovascular:      Rate and Rhythm: Normal rate.   Pulmonary:      Effort: Pulmonary effort is normal. No respiratory distress.   Musculoskeletal:         General: Normal range of motion.      Cervical back: Normal range of motion.   Skin:     General: Skin is warm and dry.      Capillary Refill: Capillary refill takes less than 2 seconds.   Neurological:      General: No focal deficit present.      Mental Status: She is alert.          Labs: "     Lab Results   Component Value Date     (L) 01/13/2025    K 4.6 01/13/2025    CL 84 (L) 01/13/2025    CO2 38 (H) 01/13/2025    BUN 31 (H) 01/13/2025    CREATININE 0.71 02/21/2025    ANIONGAP 7 (L) 01/13/2025     Lab Results   Component Value Date    HGBA1C 5.2 11/10/2024     Lab Results   Component Value Date    BNP 1,194 (H) 11/18/2024     (H) 11/10/2024     (H) 09/29/2024    Lab Results   Component Value Date    WBC 7.63 11/23/2024    HGB 9.0 (L) 11/23/2024    HCT 28.5 (L) 11/23/2024    HCT 31 (L) 11/11/2024     11/23/2024    GRAN 5.7 11/23/2024    GRAN 74.6 (H) 11/23/2024     Lab Results   Component Value Date    CHOL 149 03/22/2023    HDL 63 03/22/2023    LDLCALC 62.4 (L) 03/22/2023    TRIG 118 03/22/2023          Lipids:  Recent Labs   Lab 03/22/23  0724   LDL Cholesterol 62.4 L   HDL 63      Renal:  Recent Labs   Lab 01/13/25  0945 02/21/25  1340   Creatinine 0.88 0.71   Potassium 4.6  --    CO2 38 H  --    BUN 31 H  --      Liver:  Recent Labs   Lab 11/23/24  0446   AST 17   ALT 11       Imaging:     PEDRO 12/11/2024:    PEDRO to evaluate for prosthetic aortic valve stenosis. There is degeneration of the prosthetic valve leaflets that appears severely restricted likely secondary to pannus formation. No thrombus identified. Additionally, LV global longitudinal strain is suspicious for infiltrative cardiomyopathy. Consider clinical correlation for further evaluation.    Left Ventricle: Increased wall thickness. There is normal systolic function with a visually estimated ejection fraction of 55 - 60%.    Right Ventricle: There is hypertrophy. Systolic function is normal. Pacemaker lead present in the ventricle.    Left Atrium: The left atrial appendage has a windsock morphology. Appendage velocity is reduced at less than 40 cm/sec. There is no definitive thrombus in the left atrial appendage, however there is some sludge present.    Bi-atrial enlargement.    Aortic Valve: There is a  transcatheter valve replacement in the aortic position. It is reported to be a 26 mm Phan valve. Severely restricted motion. There is mild aortic regurgitation with a centrally directed jet.    Mitral Valve: There is mild regurgitation with a centrally directed jet.    Tricuspid Valve: There is mild to moderate regurgitation with a centrally directed jet.    Aorta: Grade 2 atherosclerosis of the ascending aorta.    Pulmonary Artery. The estimated pulmonary artery systolic pressure is at least 36 mmHg.     TTE 11/14/2024:    Left Ventricle: The left ventricle is normal in size. Normal wall thickness. There is normal systolic function with a visually estimated ejection fraction of 60 - 65%. Ejection fraction is approximately 65%.    Right Ventricle: Normal right ventricular cavity size. Wall thickness is normal. Systolic function is normal.    Left Atrium: Left atrium is moderately dilated.    Right Atrium: Right atrium is moderately dilated.    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 It is reported to be a 26 mm Phan Suzi valve. Mildly calcified cusps. Aortic valve area by VTI is 0.8 cm2. Aortic valve peak velocity is 2.9 m/s. Mean gradient is 21.8 mmHg. The dimensionless index is 0.25.  msand AcT/ET ratio is 0.36. Findings and trend from prior echocardiography evaluation is suggestive of prosthetic valve stenosis. Clinical correlation is required.    Mitral Valve: There is moderate mitral annular calcification present.    Tricuspid Valve: PISA radius is 0.6cm and Vena Contracta is 0.7cm consistent with moderate to severe regurgitation.    Pulmonary Artery: There is moderate to severe pulmonary hypertension. The estimated pulmonary artery systolic pressure is 62 mmHg.    IVC/SVC: Elevated venous pressure at 15 mmHg.    Pericardium: There is a trivial circumferential effusion.      Assessment & Plan:     1. Severe aortic stenosis    2. S/P TAVR (transcatheter  aortic valve replacement)    3. Permanent atrial fibrillation        Severe aortic stenosis  - Previous echo 11/2024 shows Paradoxical low-flow low gradient severe AS.  MISTY 0.8, AVAi 0.4, SVI 25. NYHA III-IV symptoms.    - At least 2 months of rehab have been completed.  Endorses some improvement in independence to ADLs.  Still needs walker for assistance due to chronic orthopedics/back issues.  - Plan to get repeat TTE today.  Will follow-up pending results of new echo and severity of valvular dysfunction.  - Patient non-operable in 2019. If plans to move, would need repeat CTA and to discuss patient at multidisciplinary meeting.     S/P TAVR (transcatheter aortic valve replacement)  - s/p TAVR 11/2019 with 26 mm Phan S3 valve or Dr. Capone  - now AV dysfunction as above    Permanent atrial fibrillation  - Chronic.  Stable.  Continue Eliquis.      Signed:  Jeni Huang PA-C  Interventional Cardiology      IC STAFF  I have seen the patient, reviewed the Fellow's history and physical, assessment and plan. I have personally interviewed and examined the patient and agree with the findings. Meets criteria for Paradoxical low-flow low gradient severe AS however is debilitated, on O2 but improved with 23 months of rehab. Still feels limited by SOB. Will present at MultiD meeting and communicate results with daughter and patient.     JOY Baptiste MD         [1]   Current Outpatient Medications on File Prior to Visit   Medication Sig Dispense Refill    albuterol-ipratropium (DUO-NEB) 2.5 mg-0.5 mg/3 mL nebulizer solution Take 3 mLs by nebulization every 4 (four) hours as needed for Wheezing or Shortness of Breath.      apixaban (ELIQUIS) 5 mg Tab Take 1 tablet (5 mg total) by mouth 2 (two) times daily. 180 tablet 3    ascorbic acid, vitamin C, (VITAMIN C) 500 MG tablet Take 1,000 mg by mouth once daily.       atorvastatin (LIPITOR) 10 MG tablet Take 1 tablet (10 mg total) by mouth once daily. 30 tablet 0    calcium  carbonate/vitamin D3 (CALTRATE 600 + D ORAL) Take 1 tablet by mouth once daily.      carvediloL (COREG) 12.5 MG tablet Take 1 tablet (12.5 mg total) by mouth 2 (two) times daily with meals. 180 tablet 3    clopidogreL (PLAVIX) 75 mg tablet Take 1 tablet (75 mg total) by mouth once daily. 90 tablet 3    diphenhydrAMINE (BENADRYL) 50 MG capsule Take 50mg by mouth 1 hour before contrast administration. 1 capsule 0    DULoxetine (CYMBALTA) 20 MG capsule Take 1 capsule (20 mg total) by mouth once daily. 30 capsule 0    estradioL (ESTRACE) 0.01 % (0.1 mg/gram) vaginal cream Place vaginally.      fexofenadine (ALLEGRA) 60 MG tablet Take 60 mg by mouth once daily.      fluconazole (DIFLUCAN) 150 MG Tab Take 150 mg by mouth every 7 days.      furosemide (LASIX) 40 MG tablet Take 1 tablet (40 mg total) by mouth 2 (two) times a day. 180 tablet 3    gabapentin (NEURONTIN) 300 MG capsule Take 1 capsule (300 mg total) by mouth every evening. 30 capsule 11    isosorbide mononitrate (IMDUR) 60 MG 24 hr tablet Take 1 tablet (60 mg total) by mouth once daily. 30 tablet 0    losartan (COZAAR) 50 MG tablet Take 1 tablet (50 mg total) by mouth once daily. 90 tablet 3    montelukast (SINGULAIR) 10 mg tablet Take 10 mg by mouth once daily.      nitrofurantoin (MACRODANTIN) 100 MG capsule Take 100 mg by mouth Daily.      pantoprazole (PROTONIX) 40 MG tablet Take 1 tablet (40 mg total) by mouth once daily. 90 tablet 3    predniSONE (DELTASONE) 50 MG Tab Take 50mg by mouth at 13 hours, 7 hours, and 1 hour before contrast administration. 3 tablet 0    ranolazine (RANEXA) 1,000 mg Tb12 TAKE 1 TABLET BY MOUTH TWICE A  tablet 3    senna (SENOKOT) 8.6 mg tablet Take 1 tablet by mouth 2 (two) times a day. 60 tablet 11    spironolactone (ALDACTONE) 25 MG tablet Take 1 tablet (25 mg total) by mouth once daily. 30 tablet 11    SYMBICORT 160-4.5 mcg/actuation HFAA Inhale 2 puffs into the lungs 2 (two) times daily.      metFORMIN (GLUCOPHAGE)  500 MG tablet Take 1 tablet (500 mg total) by mouth 2 (two) times daily. 60 tablet 0     Current Facility-Administered Medications on File Prior to Visit   Medication Dose Route Frequency Provider Last Rate Last Admin    0.9%  NaCl infusion   Intravenous Continuous Lynette Hightower, NP   New Bag at 05/29/24 1440    sodium chloride 0.9% flush 5 mL  5 mL Intravenous PRN Lynette Hightower, NP
